# Patient Record
Sex: FEMALE | Race: WHITE | NOT HISPANIC OR LATINO | Employment: OTHER | URBAN - METROPOLITAN AREA
[De-identification: names, ages, dates, MRNs, and addresses within clinical notes are randomized per-mention and may not be internally consistent; named-entity substitution may affect disease eponyms.]

---

## 2017-01-03 ENCOUNTER — CLINICAL SUPPORT (OUTPATIENT)
Dept: DERMATOLOGY | Facility: CLINIC | Age: 82
End: 2017-01-03
Payer: MEDICARE

## 2017-01-03 ENCOUNTER — OFFICE VISIT (OUTPATIENT)
Dept: OBSTETRICS AND GYNECOLOGY | Facility: CLINIC | Age: 82
End: 2017-01-03
Payer: MEDICARE

## 2017-01-03 VITALS
DIASTOLIC BLOOD PRESSURE: 76 MMHG | BODY MASS INDEX: 25.39 KG/M2 | HEIGHT: 63 IN | SYSTOLIC BLOOD PRESSURE: 122 MMHG | WEIGHT: 143.31 LBS

## 2017-01-03 DIAGNOSIS — Z51.89 VISIT FOR WOUND CHECK: ICD-10-CM

## 2017-01-03 DIAGNOSIS — Z48.02 VISIT FOR SUTURE REMOVAL: Primary | ICD-10-CM

## 2017-01-03 PROCEDURE — 99213 OFFICE O/P EST LOW 20 MIN: CPT | Mod: PBBFAC,27 | Performed by: ADVANCED PRACTICE MIDWIFE

## 2017-01-03 PROCEDURE — 99212 OFFICE O/P EST SF 10 MIN: CPT | Mod: S$PBB,,, | Performed by: ADVANCED PRACTICE MIDWIFE

## 2017-01-03 PROCEDURE — 99999 PR PBB SHADOW E&M-EST. PATIENT-LVL III: CPT | Mod: PBBFAC,,, | Performed by: ADVANCED PRACTICE MIDWIFE

## 2017-01-03 PROCEDURE — 99999 PR PBB SHADOW E&M-EST. PATIENT-LVL III: CPT | Mod: PBBFAC,,,

## 2017-01-03 NOTE — PROGRESS NOTES
Suture Removal note:  CC: 83 y.o. female patient is here for suture removal.         HPI: Patient is s/p excision of SCC from the left medial shoulder on 12/16/2016.  Patient reports no problems.    WOUND PE:  Sutures intact.  Wound healing well.  Good approximation of skin edges.  No signs or symptoms of infection.    IMPRESSION:  SCC - margins clear.    PLAN:  Sutures removed today.  Continue wound care.    RTC: In 6 months.

## 2017-01-04 ENCOUNTER — OFFICE VISIT (OUTPATIENT)
Dept: FAMILY MEDICINE | Facility: CLINIC | Age: 82
End: 2017-01-04
Payer: MEDICARE

## 2017-01-04 VITALS
WEIGHT: 149.94 LBS | HEIGHT: 63 IN | HEART RATE: 64 BPM | SYSTOLIC BLOOD PRESSURE: 146 MMHG | TEMPERATURE: 98 F | DIASTOLIC BLOOD PRESSURE: 70 MMHG | BODY MASS INDEX: 26.57 KG/M2

## 2017-01-04 DIAGNOSIS — I10 HYPERTENSION, ESSENTIAL: ICD-10-CM

## 2017-01-04 DIAGNOSIS — R20.9 DISTURBANCE OF SKIN SENSATION: ICD-10-CM

## 2017-01-04 DIAGNOSIS — M79.2 NEUROPATHIC PAIN, LEG, LEFT: Primary | ICD-10-CM

## 2017-01-04 DIAGNOSIS — M79.7 FIBROMYALGIA: ICD-10-CM

## 2017-01-04 PROCEDURE — 99999 PR PBB SHADOW E&M-EST. PATIENT-LVL III: CPT | Mod: PBBFAC,,, | Performed by: INTERNAL MEDICINE

## 2017-01-04 PROCEDURE — 99214 OFFICE O/P EST MOD 30 MIN: CPT | Mod: S$PBB,,, | Performed by: INTERNAL MEDICINE

## 2017-01-04 PROCEDURE — 99213 OFFICE O/P EST LOW 20 MIN: CPT | Mod: PBBFAC,PO | Performed by: INTERNAL MEDICINE

## 2017-01-04 NOTE — PROGRESS NOTES
HISTORY OF PRESENT ILLNESS:    Gerda Lai is a 83 y.o. female  No LMP recorded. Patient is postmenopausal. presents today for follow up evaluation of labial abscess    Past Medical History   Diagnosis Date    Arthritis     Basal cell carcinoma 2016     right post auricular neck     Breast cancer     Cataract     Fibromyalgia     Hyperlipidemia     Hypertension     Personal history of colonic polyps     SCC (squamous cell carcinoma)      R chest    SCC (squamous cell carcinoma) 2016     left medial shoulder    Squamous cell carcinoma      right forearm    Thyroid disease     Vaginitis        Past Surgical History   Procedure Laterality Date    Thyriodectomy       partial    Total hip arthroplasty       right    Mastectomy       partial right    Tonsillectomy      Adenoidectomy      Cataract extraction w/  intraocular lens implant Bilateral     Yag  Left        MEDICATIONS AND ALLERGIES:      Current Outpatient Prescriptions:     acetaminophen (TYLENOL) 500 MG tablet, Take 500 mg by mouth every 6 (six) hours as needed for Pain. , Disp: , Rfl:     aliskiren (TEKTURNA) 300 MG Tab, Take 1 tablet (300 mg total) by mouth once daily., Disp: 30 tablet, Rfl: 11    ascorbic acid (VITAMIN C) 100 MG tablet, Take 100 mg by mouth once daily., Disp: , Rfl:     B INFANTIS/B ANI/B JOSE/B BIFID (PROBIOTIC 4X ORAL), Take 1 tablet by mouth daily, Disp: , Rfl:     chlorthalidone (HYGROTEN) 25 MG Tab, Take 25 mg by mouth once daily., Disp: , Rfl: 11    coenzyme Q10 (CO Q-10) 100 mg capsule, Take 100 mg by mouth once daily., Disp: , Rfl:     fluticasone (FLONASE) 50 mcg/actuation nasal spray, USE 1 SPRAY IN EACH NOSTRIL ONCE DAILY (Patient taking differently: as needed. ), Disp: 16 g, Rfl: 3    glucosamine-chondroitin 500-400 mg tablet, Take 1 tablet by mouth 2 (two) times daily. , Disp: , Rfl:     labetalol (NORMODYNE) 100 MG tablet, Take 1 tablet (100 mg total) by mouth every 12 (twelve)  hours. Take one in am and two in the pm, Disp: 60 tablet, Rfl: 11    lorazepam (ATIVAN) 0.5 MG tablet, Take one half tablet twice daily, Disp: 60 tablet, Rfl: 3    multivitamin capsule, Take 1 capsule by mouth once daily., Disp: , Rfl:     omeprazole (PRILOSEC OTC) 20 MG tablet, Take 20 mg by mouth once daily.  , Disp: , Rfl:     pravastatin (PRAVACHOL) 40 MG tablet, Take 1 tablet (40 mg total) by mouth once daily., Disp: 90 tablet, Rfl: 3    UNABLE TO FIND, once daily. OTC EYE DROP; Systane, Disp: , Rfl:     Review of patient's allergies indicates:   Allergen Reactions    Voltaren [diclofenac sodium] Other (See Comments)     Drops blood pressure    Clarithromycin Other (See Comments)     Weak, extreme fatigue. dizziness    Losartan Rash    Flexeril [cyclobenzaprine] Other (See Comments)     Dizziness    Iodine and iodide containing products     Lisinopril Other (See Comments)     Cough and sensation of throat swelling/?angioedema    Norvasc [amlodipine]      Not working      Tramadol      Dizzy and weak    Metoprolol Swelling     Tightness in throat    Sulfa (sulfonamide antibiotics) Rash    Verapamil (bulk) Palpitations       ROS:  GENERAL: No fever or chills.  BREASTS: No pain. No lumps. No discharge.  ABDOMEN: No pain. No nausea. No vomiting. No diarrhea. No constipation.  REPRODUCTIVE: No abnormal bleeding.   VULVA: No complaints  VAGINA: .No complaints  URINARY: No incontinence. No nocturia. No frequency. No dysuria.    PE:  APPEARANCE: Well nourished, well developed, in no acute distress.  AFFECT: WNL, alert and oriented x 3. Interactive during exam  PELVIC: Normal external female genitalia without lesions. Abscess well healed, intact, no s/s infection. Normal hair distribution. Adequate perineal body, normal urethral meatus.        DIAGNOSIS: Normal exam        LABS AND TESTS ORDERED:None    MEDICATIONS PRESCRIBED:None        FOLLOW-UP - PRN

## 2017-01-04 NOTE — MR AVS SNAPSHOT
[facility]  [address] [facility], [address]  [facility] [place] [address]  Phone: [phone]  Fax: [phone]                  Gerda Lai   2017 3:00 PM   Office Visit    Description:  Female : 1933   Provider:  Nancy Story MD   Department:  [facility]           Reason for Visit     Numbness           Diagnoses this Visit        Comments    Neuropathic pain, leg, left    -  Primary     Hypertension, essential         Disturbance of skin sensation         Fibromyalgia                To Do List           Future Appointments        Provider Department Dept Phone    1/10/2017 10:00 AM Cesar Burroughs MD [facility] - Rheumatology [phone]      Goals (5 Years of Data)     None      [facility]     [facility] On Call Nurse Care Line -  Assistance  Registered nurses in the [facility] provide clinical advisement, health education, appointment booking, and other advisory services.  Call for this free service at [phone].             Medications           Message regarding Medications     Verify the changes and/or additions to your medication regime listed below are the same as discussed with your clinician today.  If any of these changes or additions are incorrect, please notify your healthcare provider.             Verify that the below list of medications is an accurate representation of the medications you are currently taking.  If none reported, the list may be blank. If incorrect, please contact your healthcare provider. Carry this list with you in case of emergency.           Current Medications     acetaminophen (TYLENOL) 500 MG tablet Take 500 mg by mouth every 6 (six) hours as needed for Pain.     aliskiren (TEKTURNA) 300 MG Tab Take 1 tablet (300 mg total) by mouth once daily.    ascorbic acid (VITAMIN C) 100 MG tablet Take 100 mg by mouth once daily.    B INFANTIS/B ANI/B JOSE/B BIFID (PROBIOTIC 4X ORAL) Take 1 tablet by mouth  "daily    chlorthalidone (HYGROTEN) 25 MG Tab Take 25 mg by mouth once daily.    coenzyme Q10 (CO Q-10) 100 mg capsule Take 100 mg by mouth once daily.    fluticasone (FLONASE) 50 mcg/actuation nasal spray USE 1 SPRAY IN EACH NOSTRIL ONCE DAILY    glucosamine-chondroitin 500-400 mg tablet Take 1 tablet by mouth 2 (two) times daily.     labetalol (NORMODYNE) 100 MG tablet Take 1 tablet (100 mg total) by mouth every 12 (twelve) hours. Take one in am and two in the pm    lorazepam (ATIVAN) 0.5 MG tablet Take one half tablet twice daily    multivitamin capsule Take 1 capsule by mouth once daily.    omeprazole (PRILOSEC OTC) 20 MG tablet Take 20 mg by mouth once daily.      pravastatin (PRAVACHOL) 40 MG tablet Take 1 tablet (40 mg total) by mouth once daily.    UNABLE TO FIND once daily. OTC EYE DROP; Systane           Clinical Reference Information           Vital Signs - Last Recorded  Most recent update: 1/4/2017  3:14 PM by Keren Cleaning LPN    BP Pulse Temp Ht Wt BMI    (!) 146/70 (BP Location: Left arm, Patient Position: Sitting, BP Method: Manual) 64 97.5 °F (36.4 °C) 5' 3" (1.6 m) 68 kg (149 lb 14.6 oz) 26.56 kg/m2      Blood Pressure          Most Recent Value    BP  (!)  146/70      Allergies as of 1/4/2017     Voltaren [Diclofenac Sodium]    Clarithromycin    Losartan    Flexeril [Cyclobenzaprine]    Iodine And Iodide Containing Products    Lisinopril    Norvasc [Amlodipine]    Tramadol    Metoprolol    Sulfa (Sulfonamide Antibiotics)    Verapamil (Bulk)      Immunizations Administered on Date of Encounter - 1/4/2017     None      Orders Placed During Today's Visit     Future Labs/Procedures Expected by Expires    CBC auto differential  1/4/2017 3/5/2018    Comprehensive metabolic panel  1/4/2017 3/5/2018    Magnesium  1/4/2017 3/5/2018    PHOSPHORUS  1/4/2017 3/5/2018    TSH  1/4/2017 3/5/2018    Vitamin B12  1/4/2017 3/5/2018    Vitamin D  1/4/2017 3/5/2018      "

## 2017-01-10 ENCOUNTER — OFFICE VISIT (OUTPATIENT)
Dept: FAMILY MEDICINE | Facility: CLINIC | Age: 82
End: 2017-01-10
Payer: MEDICARE

## 2017-01-10 VITALS
WEIGHT: 146.63 LBS | TEMPERATURE: 98 F | HEART RATE: 61 BPM | BODY MASS INDEX: 25.03 KG/M2 | HEIGHT: 64 IN | DIASTOLIC BLOOD PRESSURE: 49 MMHG | SYSTOLIC BLOOD PRESSURE: 111 MMHG

## 2017-01-10 DIAGNOSIS — J06.9 VIRAL URI: Primary | ICD-10-CM

## 2017-01-10 PROCEDURE — 99999 PR PBB SHADOW E&M-EST. PATIENT-LVL III: CPT | Mod: PBBFAC,,, | Performed by: FAMILY MEDICINE

## 2017-01-10 PROCEDURE — 99213 OFFICE O/P EST LOW 20 MIN: CPT | Mod: S$PBB,,, | Performed by: FAMILY MEDICINE

## 2017-01-10 PROCEDURE — 99213 OFFICE O/P EST LOW 20 MIN: CPT | Mod: PBBFAC,PO | Performed by: FAMILY MEDICINE

## 2017-01-10 NOTE — MR AVS SNAPSHOT
[facility]  [address] [facility], [address]  [place] [address]  Phone: [phone]  Fax: [phone]                  Gerda Lai   1/10/2017 3:20 PM   Office Visit    Description:  Female : 1933   Provider:  Demario Sellers MD   Department:  [facility]           Reason for Visit     Sore Throat     Nasal Congestion     Fatigue                To Do List           Goals (5 Years of Data)     None      [facility]     [facility] On Call Nurse Care Line -  Assistance  Registered nurses in the [facility] On Call Center provide clinical advisement, health education, appointment booking, and other advisory services.  Call for this free service at [phone].             Medications           Message regarding Medications     Verify the changes and/or additions to your medication regime listed below are the same as discussed with your clinician today.  If any of these changes or additions are incorrect, please notify your healthcare provider.             Verify that the below list of medications is an accurate representation of the medications you are currently taking.  If none reported, the list may be blank. If incorrect, please contact your healthcare provider. Carry this list with you in case of emergency.           Current Medications     acetaminophen (TYLENOL) 500 MG tablet Take 500 mg by mouth every 6 (six) hours as needed for Pain.     aliskiren (TEKTURNA) 300 MG Tab Take 1 tablet (300 mg total) by mouth once daily.    ascorbic acid (VITAMIN C) 100 MG tablet Take 100 mg by mouth once daily.    B INFANTIS/B ANI/B JOSE/B BIFID (PROBIOTIC 4X ORAL) Take 1 tablet by mouth daily    chlorthalidone (HYGROTEN) 25 MG Tab Take 25 mg by mouth once daily.    coenzyme Q10 (CO Q-10) 100 mg capsule Take 100 mg by mouth once daily.    fluticasone (FLONASE) 50 mcg/actuation nasal spray USE 1 SPRAY IN EACH NOSTRIL ONCE DAILY    glucosamine-chondroitin 500-400 mg tablet  "Take 1 tablet by mouth 2 (two) times daily.     labetalol (NORMODYNE) 100 MG tablet Take 1 tablet (100 mg total) by mouth every 12 (twelve) hours. Take one in am and two in the pm    lorazepam (ATIVAN) 0.5 MG tablet Take one half tablet twice daily    multivitamin capsule Take 1 capsule by mouth once daily.    omeprazole (PRILOSEC OTC) 20 MG tablet Take 20 mg by mouth once daily.      pravastatin (PRAVACHOL) 40 MG tablet Take 1 tablet (40 mg total) by mouth once daily.    UNABLE TO FIND once daily. OTC EYE DROP; Systane           Clinical Reference Information           Vital Signs - Last Recorded  Most recent update: 1/10/2017  3:21 PM by Alta Alvarado MA    BP Pulse Temp Ht Wt BMI    (!) 111/49 (BP Location: Left arm, Patient Position: Sitting, BP Method: Automatic) 61 98.1 °F (36.7 °C) (Oral) 5' 4" (1.626 m) 66.5 kg (146 lb 9.7 oz) 25.16 kg/m2      Blood Pressure          Most Recent Value    BP  (!)  111/49      Allergies as of 1/10/2017     Voltaren [Diclofenac Sodium]    Clarithromycin    Losartan    Flexeril [Cyclobenzaprine]    Iodine And Iodide Containing Products    Lisinopril    Norvasc [Amlodipine]    Tramadol    Metoprolol    Sulfa (Sulfonamide Antibiotics)    Verapamil (Bulk)      Immunizations Administered on Date of Encounter - 1/10/2017     None      "

## 2017-01-10 NOTE — PROGRESS NOTES
Subjective:       Patient ID: Gerda Lai is a 83 y.o. female.    Chief Complaint: Sore Throat; Nasal Congestion; and Fatigue    HPI Comments: Disclaimer: This note has been generated using voice-recognition software. There may be typographical errors that have been missed during proof-reading    84 yo presents with 2-3 day history of cough, rhinorrhea, nasal congestion and sore throat.  Over past day has had several episodes of epistaxis from right nasal area.  Overall, symptoms have improved over past day    Sore Throat    Associated symptoms include congestion. Pertinent negatives include no shortness of breath.   Fatigue   Associated symptoms include congestion, fatigue and a sore throat. Pertinent negatives include no chest pain, chills or fever.     Review of Systems   Constitutional: Positive for fatigue. Negative for chills and fever.   HENT: Positive for congestion, nosebleeds, rhinorrhea and sore throat. Negative for sinus pressure.    Respiratory: Negative for chest tightness, shortness of breath and wheezing.    Cardiovascular: Negative for chest pain.       Objective:      Physical Exam   Constitutional: She appears well-developed and well-nourished. No distress.   HENT:   Right Ear: Tympanic membrane and ear canal normal.   Left Ear: Tympanic membrane and ear canal normal.   Nose: Mucosal edema and rhinorrhea present. Right sinus exhibits no maxillary sinus tenderness and no frontal sinus tenderness. Left sinus exhibits no maxillary sinus tenderness and no frontal sinus tenderness.   Evidence of anterior epistaxis, right nasal area   Neck: Normal range of motion. No JVD present.   Pulmonary/Chest: Effort normal and breath sounds normal. She has no wheezes. She has no rales.   Lymphadenopathy:     She has no cervical adenopathy.       Assessment:       No diagnosis found.    Plan:       1.  Reassurance, continue OTC medications, Tylenol  2.  Instructed on treatment of epistaxis

## 2017-01-11 ENCOUNTER — OFFICE VISIT (OUTPATIENT)
Dept: RHEUMATOLOGY | Facility: CLINIC | Age: 82
End: 2017-01-11
Payer: MEDICARE

## 2017-01-11 VITALS
DIASTOLIC BLOOD PRESSURE: 69 MMHG | HEART RATE: 63 BPM | BODY MASS INDEX: 24.92 KG/M2 | WEIGHT: 146 LBS | HEIGHT: 64 IN | SYSTOLIC BLOOD PRESSURE: 122 MMHG

## 2017-01-11 DIAGNOSIS — M70.62 TROCHANTERIC BURSITIS, LEFT HIP: ICD-10-CM

## 2017-01-11 DIAGNOSIS — M70.62 GREATER TROCHANTERIC BURSITIS OF LEFT HIP: ICD-10-CM

## 2017-01-11 DIAGNOSIS — M19.90 OSTEOARTHRITIS, UNSPECIFIED OSTEOARTHRITIS TYPE, UNSPECIFIED SITE: Primary | ICD-10-CM

## 2017-01-11 DIAGNOSIS — M19.042 PRIMARY OSTEOARTHRITIS OF BOTH HANDS: ICD-10-CM

## 2017-01-11 DIAGNOSIS — M19.041 PRIMARY OSTEOARTHRITIS OF BOTH HANDS: ICD-10-CM

## 2017-01-11 DIAGNOSIS — M54.17 LUMBOSACRAL RADICULOPATHY AT L5: ICD-10-CM

## 2017-01-11 DIAGNOSIS — M17.0 PRIMARY OSTEOARTHRITIS OF BOTH KNEES: ICD-10-CM

## 2017-01-11 DIAGNOSIS — R20.2 PARESTHESIA OF LEFT LEG: ICD-10-CM

## 2017-01-11 PROCEDURE — 99213 OFFICE O/P EST LOW 20 MIN: CPT | Mod: 25,S$PBB,, | Performed by: INTERNAL MEDICINE

## 2017-01-11 PROCEDURE — 20610 DRAIN/INJ JOINT/BURSA W/O US: CPT | Mod: PBBFAC | Performed by: INTERNAL MEDICINE

## 2017-01-11 PROCEDURE — 99213 OFFICE O/P EST LOW 20 MIN: CPT | Mod: PBBFAC | Performed by: INTERNAL MEDICINE

## 2017-01-11 PROCEDURE — 99999 PR PBB SHADOW E&M-EST. PATIENT-LVL III: CPT | Mod: PBBFAC,,, | Performed by: INTERNAL MEDICINE

## 2017-01-11 RX ORDER — TRIAMCINOLONE ACETONIDE 40 MG/ML
40 INJECTION, SUSPENSION INTRA-ARTICULAR; INTRAMUSCULAR
Status: DISCONTINUED | OUTPATIENT
Start: 2017-01-11 | End: 2017-01-11 | Stop reason: HOSPADM

## 2017-01-11 RX ORDER — GABAPENTIN 100 MG/1
100 CAPSULE ORAL 3 TIMES DAILY
Qty: 90 CAPSULE | Refills: 11 | Status: SHIPPED | OUTPATIENT
Start: 2017-01-11 | End: 2017-03-23

## 2017-01-11 RX ORDER — TRIAMCINOLONE ACETONIDE 40 MG/ML
40 INJECTION, SUSPENSION INTRA-ARTICULAR; INTRAMUSCULAR ONCE
Qty: 1 ML | Refills: 0 | Status: SHIPPED | OUTPATIENT
Start: 2017-01-11 | End: 2017-01-11

## 2017-01-11 RX ADMIN — TRIAMCINOLONE ACETONIDE 40 MG: 40 INJECTION, SUSPENSION INTRA-ARTICULAR; INTRAMUSCULAR at 11:01

## 2017-01-11 ASSESSMENT — ROUTINE ASSESSMENT OF PATIENT INDEX DATA (RAPID3)
FATIGUE SCORE: 7
PAIN SCORE: 0
PATIENT GLOBAL ASSESSMENT SCORE: 5
TOTAL RAPID3 SCORE: 2.33
MDHAQ FUNCTION SCORE: .6
PSYCHOLOGICAL DISTRESS SCORE: 0
AM STIFFNESS SCORE: 0, NO

## 2017-01-11 NOTE — PROGRESS NOTES
"Subjective:       Patient ID: Gerda Lai is a 83 y.o. female.    Chief Complaint: No chief complaint on file.    HPI     Ms. Gerda Lai is a 84 yo woman with PMHx of breast cancer, IBD, steoarthritis and fibromyalgia who presents to the clinic today for 6 month follow up visit. She reports tingling of her left lower extremity: tingling that goes from her left lower back down to the back and side of her left knee/calf. It is constant, is not associated with any pain or significant numbness, and nothing has helped her so far. She is not able to put an exact time frame as to when it started, but it is there constantly. She also has intermitted left upper extremity tingling. She was seen in the ER for this along with dizziness 3 weeks ago (on 12/14/16). Her dizziness was attributed to changes in her BP medication regimen and she was discharged after being instructed to follow up on out-pt basis.  She has had recent gastroenteritis 3-4 days ago, associated with diarrhea and two episodes of fecal incontinence.  However, she reports improved diarrhea and fecal incontinence now. She denies any falls, gait abnormalities, urinary incontinence, lower back pain or sudden onset of lower extremity weakness.     She last saw orthopedics for right knee pain on 12/1/16. She underwent Kenalog and lidocaine injection of her right knee during the visit. Imaging of bilateral knees was obtained (on 12/1/16 by orthopedics) which showed: "Moderate degenerative change seen in both knees but especially on the right with joint space narrowing and osteophyte forming medially and laterally.  This is seen on the left to a lesser extent."       Review of Systems   Constitutional: Negative for appetite change, fatigue and unexpected weight change.   Eyes: Negative for visual disturbance.   Respiratory: Negative for shortness of breath.    Cardiovascular: Negative for chest pain.   Gastrointestinal: Positive for diarrhea. Negative for " "abdominal pain, nausea and vomiting.        Two episodes of fecal incontinence with diarrhea, improved now   Genitourinary: Negative for dysuria and enuresis.   Musculoskeletal: Positive for arthralgias and joint swelling. Negative for back pain, gait problem, myalgias and neck pain.   Neurological: Negative for dizziness, syncope, weakness and light-headedness.         Objective:     Visit Vitals    /69    Pulse 63    Ht 5' 3.6" (1.615 m)    Wt 66.2 kg (146 lb)    BMI 25.38 kg/m2        Physical Exam   Vitals reviewed.  Constitutional: She is oriented to person, place, and time and well-developed, well-nourished, and in no distress. No distress.   Eyes: EOM are normal.   Neck: Normal range of motion.   Cardiovascular: Normal rate, regular rhythm, normal heart sounds and intact distal pulses.  Exam reveals no gallop and no friction rub.    No murmur heard.  Pulmonary/Chest: Effort normal and breath sounds normal. No respiratory distress. She has no wheezes. She has no rales. She exhibits no tenderness.       Right Side Rheumatological Exam     Examination finds the shoulder and wrist normal.    She has swelling of the knee    The patient has an enlarged 1st PIP, 1st MCP, 2nd PIP, 2nd MCP, 3rd PIP, 3rd MCP, 4th PIP, 4th MCP, 5th PIP and 5th MCP    Muscle Strength (0-5 scale):  Deltoid:  5  Triceps:  5  Iliopsoas: 5  Quadriceps:  5   Distal Lower Extremity: 5    Left Side Rheumatological Exam     Examination finds the shoulder and wrist normal.    She has swelling of the knee    The patient has an enlarged 1st PIP, 1st MCP, 2nd PIP, 2nd MCP, 3rd PIP, 3rd MCP, 4th PIP, 4th MCP, 5th PIP and 5th MCP.    Muscle Strength (0-5 scale):  Deltoid:  5  Triceps:  5  Iliopsoas: 5  Quadriceps:  5   Distal Lower Extremity: 5      Neurological: She is alert and oriented to person, place, and time. She displays normal reflexes. Coordination normal.   Reflex Scores:       Patellar reflexes are 2+ on the right side and 2+ on " the left side.       Achilles reflexes are 2+ on the right side and 2+ on the left side.  Skin: Skin is warm and dry. She is not diaphoretic.     Musculoskeletal: Normal range of motion. She exhibits tenderness ( left trochanter). She exhibits no edema or deformity.   Straight leg raise test negative           Assessment:       1. Osteoarthritis, unspecified osteoarthritis type, unspecified site    2. Paresthesia of left leg    3. Greater trochanteric bursitis of left hip            Plan:     RTC in 3 mo   Gabapentin 100 mg tid for tingling of LLE   X-ray lumbar spine, pelvis today   Steroid injection of left trochanter today       1. Osteoarthritis, unspecified osteoarthritis type, unspecified site  - X-Ray Pelvis Routine AP; Future    2. Paresthesia of left leg -- left lumbar radiculopathy in L5 distribution   - history of breast cancer; due to concern for possible metastatic disease explaining paresthesias, will obtain imaging of spine    - X-Ray Lumbar Spine AP And Lateral; Future   - pt instructed to start gabapentin 100 mg every night, and take it up to 3 times daily when she is not driving or operating machinery; she will f/u in 3 months    - gabapentin (NEURONTIN) 100 MG capsule; Take 1 capsule (100 mg total) by mouth 3 (three) times daily.  Dispense: 90 capsule; Refill: 11    3. Greater trochanteric bursitis of left hip  - given triamcinolone-lidocaine injection of left greater trochanter today for trochanteric bursitis (see procedure note)    - triamcinolone acetonide (KENALOG-40) 40 mg/mL injection; Inject 1 mL (40 mg total) into the muscle once.  Dispense: 1 mL; Refill: 0      Discussed with attending Dr. Burroughs. Staff recommendations to follow.     Ramirez Garcia MD   PGY1 Internal Medicine   [facility]   Pager [phone]

## 2017-01-11 NOTE — PROCEDURES
Large Joint Aspiration/Injection  Date/Time: 1/11/2017 11:01 AM  Performed by: ROMINA MITCHELL  Authorized by: ROMINA MITCHELL     Consent Done?:  Yes (Verbal)  Indications:  Pain  Procedure site marked: Yes    Timeout: Prior to procedure the correct patient, procedure, and site was verified      Location:  Hip  Site:  L greater trochanteric bursa  Prep: Patient was prepped and draped in usual sterile fashion    Ultrasonic Guidance for needle placement: No  Needle size:  25 G  Approach:  Lateral  Medications:  40 mg triamcinolone acetonide 40 mg/mL  Patient tolerance:  Patient tolerated the procedure well with no immediate complications

## 2017-01-11 NOTE — MR AVS SNAPSHOT
[facility]  [address]  [place] [address]  Phone: [phone]  Fax: [phone]                  Gerda Lai   2017 9:30 AM   Office Visit    Description:  Female : 1933   Provider:  Cesar Burroughs MD   Department:  [facility]           Diagnoses this Visit        Comments    Osteoarthritis, unspecified osteoarthritis type, unspecified site    -  Primary            To Do List           Future Appointments        Provider Department Dept Phone    2017 9:20 AM Demario Sellers MD [facility] [phone]      Goals (5 Years of Data)     None      Follow-Up and Disposition     Return in about 3 months (around 2017).       These Medications        Disp Refills Start End    gabapentin (NEURONTIN) 100 MG capsule 90 capsule 11 2017    Take 1 capsule (100 mg total) by mouth 3 (three) times daily. - Oral    Pharmacy: [facility]/pharmacy #[id number] - [place] - [address] Ph #: [phone]         [facility]     [facility] On Call Nurse Care Line -  Assistance  Registered nurses in the [facility] On Call Center provide clinical advisement, health education, appointment booking, and other advisory services.  Call for this free service at [phone].             Medications           Message regarding Medications     Verify the changes and/or additions to your medication regime listed below are the same as discussed with your clinician today.  If any of these changes or additions are incorrect, please notify your healthcare provider.        START taking these NEW medications        Refills    gabapentin (NEURONTIN) 100 MG capsule 11    Sig: Take 1 capsule (100 mg total) by mouth 3 (three) times daily.    Class: Normal    Route: Oral           Verify that the below list of medications is an accurate representation of the medications you are currently taking.  If none reported, the list may be blank. If  "incorrect, please contact your healthcare provider. Carry this list with you in case of emergency.           Current Medications     acetaminophen (TYLENOL) 500 MG tablet Take 500 mg by mouth every 6 (six) hours as needed for Pain.     aliskiren (TEKTURNA) 300 MG Tab Take 1 tablet (300 mg total) by mouth once daily.    ascorbic acid (VITAMIN C) 100 MG tablet Take 100 mg by mouth once daily.    B INFANTIS/B ANI/B JOSE/B BIFID (PROBIOTIC 4X ORAL) Take 1 tablet by mouth daily    chlorthalidone (HYGROTEN) 25 MG Tab Take 25 mg by mouth once daily.    coenzyme Q10 (CO Q-10) 100 mg capsule Take 100 mg by mouth once daily.    fluticasone (FLONASE) 50 mcg/actuation nasal spray USE 1 SPRAY IN EACH NOSTRIL ONCE DAILY    glucosamine-chondroitin 500-400 mg tablet Take 1 tablet by mouth 2 (two) times daily.     labetalol (NORMODYNE) 100 MG tablet Take 1 tablet (100 mg total) by mouth every 12 (twelve) hours. Take one in am and two in the pm    lorazepam (ATIVAN) 0.5 MG tablet Take one half tablet twice daily    multivitamin capsule Take 1 capsule by mouth once daily.    omeprazole (PRILOSEC OTC) 20 MG tablet Take 20 mg by mouth once daily.      pravastatin (PRAVACHOL) 40 MG tablet Take 1 tablet (40 mg total) by mouth once daily.    UNABLE TO FIND once daily. OTC EYE DROP; Systane    gabapentin (NEURONTIN) 100 MG capsule Take 1 capsule (100 mg total) by mouth 3 (three) times daily.           Clinical Reference Information           Vital Signs - Last Recorded  Most recent update: 1/11/2017  9:36 AM by Lu Trujillo MA    BP Pulse Ht Wt BMI    122/69 63 5' 3.6" (1.615 m) 66.2 kg (146 lb) 25.38 kg/m2      Blood Pressure          Most Recent Value    BP  122/69      Allergies as of 1/11/2017     Voltaren [Diclofenac Sodium]    Clarithromycin    Losartan    Flexeril [Cyclobenzaprine]    Iodine And Iodide Containing Products    Lisinopril    Norvasc [Amlodipine]    Tramadol    Metoprolol    Sulfa (Sulfonamide Antibiotics)    " Verapamil (Bulk)      Immunizations Administered on Date of Encounter - 1/11/2017     None      Orders Placed During Today's Visit     Future Labs/Procedures Expected by Expires    X-Ray Lumbar Spine AP And Lateral  1/11/2017 1/11/2018    X-Ray Pelvis Routine AP  1/11/2017 1/11/2018      Instructions    RTC in 3 months.   You got a steroid injection in your hip today.   You are starting a new medication: gabapentin 100 mg up to three times a day. You should not take gabapentin when you are driving in the daytime.   You are also getting x-rays of your spine and pelvis today.

## 2017-01-11 NOTE — PATIENT INSTRUCTIONS
RTC in 3 months.   You got a steroid injection in your hip today.   You are starting a new medication: gabapentin 100 mg up to three times a day. You should not take gabapentin when you are driving in the daytime.   You are also getting x-rays of your spine and pelvis today.

## 2017-01-11 NOTE — PROGRESS NOTES
I have personally taken the history and examined the patient and agree with the resident,s note as stated above. Pins and needles sensation left posterior hip, post thigh to lateral calf. No low back pain or weakness. No trauma. Saw Ortho 12/1/16 for pain in right knee, osteoarthritis, injected with steroid and improved.    Comprehensive Initial Assessment  Pain level: see MHAQ  Functional status: see MHAQ  Patient has:     Previous history malignancy: yes, breast    Weight loss: No   Previous longstanding steroid use or immunosuppression: NO    Recent significant infection: No   Fracture or suspected fracture: No   Bowel or bladder incontinence: bowel incontinence x 2 related to diarrhea, now resolving    Prior treatment and response: none  Employment status: none  Are radicular symptoms present?  Yes, left L5    Physical examination  Straight leg raise test b/l : negative  Ankle and knee reflexes: intact and normal  Quadriceps; ankle and great toe dorsiflexion and plantar flexion strength: Normal  Pulses in lower extremities: Normal  Sensory exam: NT    Treatment Plan  Advised against bed rest: Yes  Advised normal activity as tolerated: Yes      Exam: as above. Marked tenderness left>> right greater trochanter. Decreased internal rotation left hip(15 d)            Left lumbar radiculopathy, L5 predominantly  Left trochanteric bursitis, OA left hip    * After verbal consent and cleansing with Chloraprep the left gr. Trochanteric bursa injected with Kenalog 40mg with 1 ml 1 % lidocaine. Patient tolerated procedure well. Performed by Dr. Garcia and personally witnessed  X-ray lumbar spine and ap pelvis  Trial of gabapentin 100mg q pm, may increase up to tid as tolerated. No driving after dosing.  RTC 3 months. If not improved, refer to back and spine clinic, PT

## 2017-01-13 ENCOUNTER — TELEPHONE (OUTPATIENT)
Dept: RHEUMATOLOGY | Facility: CLINIC | Age: 82
End: 2017-01-13

## 2017-01-18 ENCOUNTER — TELEPHONE (OUTPATIENT)
Dept: RHEUMATOLOGY | Facility: CLINIC | Age: 82
End: 2017-01-18

## 2017-01-18 DIAGNOSIS — R25.2 CRAMP OF BOTH LOWER EXTREMITIES: Primary | ICD-10-CM

## 2017-01-18 DIAGNOSIS — N18.9 CHRONIC KIDNEY DISEASE, UNSPECIFIED STAGE: ICD-10-CM

## 2017-01-18 NOTE — TELEPHONE ENCOUNTER
Patient reports having severe leg cramps in both legs that started last night, and the patient also reported that she did a lot of walking yesterday. Pt. Reports that she started Gabapetin last week to help with leg pain she was already having and believes it made it worse, and would like to stop taking the medication. The pt. Last took a dose of the Gabapentin last night and is holding it today, to see what Dr. Burroughs recommends. I did advise the patient to stay well hydrated with all of the walking she is doing, because that can also cause muscle cramps, and patient verbalizes that she has been drinking lots of water. I verbalized to the pt. That I would consult with Dr. Burroughs and someone would call her back, she verbalized understanding.

## 2017-01-18 NOTE — TELEPHONE ENCOUNTER
Verbalized to patient that per Dr. Burroughs it is ok for her to stop her Gabapentin, pt. Verbalized understanding.

## 2017-01-23 ENCOUNTER — HOSPITAL ENCOUNTER (OUTPATIENT)
Dept: RADIOLOGY | Facility: HOSPITAL | Age: 82
Discharge: HOME | End: 2017-01-23
Attending: INTERNAL MEDICINE
Payer: MEDICARE

## 2017-01-23 ENCOUNTER — HOSPITAL ENCOUNTER (OUTPATIENT)
Dept: RADIOLOGY | Facility: HOSPITAL | Age: 82
Discharge: HOME | End: 2017-01-23
Attending: HOSPITALIST
Payer: MEDICARE

## 2017-01-23 DIAGNOSIS — M19.90 OSTEOARTHRITIS, UNSPECIFIED OSTEOARTHRITIS TYPE, UNSPECIFIED SITE: ICD-10-CM

## 2017-01-23 PROCEDURE — 72100 X-RAY EXAM L-S SPINE 2/3 VWS: CPT | Mod: TC,PO

## 2017-01-23 PROCEDURE — 72170 X-RAY EXAM OF PELVIS: CPT | Mod: TC,PO

## 2017-01-23 PROCEDURE — 72100 X-RAY EXAM L-S SPINE 2/3 VWS: CPT | Mod: 26,,, | Performed by: RADIOLOGY

## 2017-01-23 PROCEDURE — 72170 X-RAY EXAM OF PELVIS: CPT | Mod: 26,,, | Performed by: RADIOLOGY

## 2017-01-25 ENCOUNTER — TELEPHONE (OUTPATIENT)
Dept: RHEUMATOLOGY | Facility: CLINIC | Age: 82
End: 2017-01-25

## 2017-01-25 ENCOUNTER — OFFICE VISIT (OUTPATIENT)
Dept: FAMILY MEDICINE | Facility: CLINIC | Age: 82
End: 2017-01-25
Payer: MEDICARE

## 2017-01-25 VITALS
TEMPERATURE: 98 F | WEIGHT: 146.63 LBS | SYSTOLIC BLOOD PRESSURE: 130 MMHG | HEART RATE: 64 BPM | DIASTOLIC BLOOD PRESSURE: 60 MMHG | HEIGHT: 63 IN | BODY MASS INDEX: 25.98 KG/M2

## 2017-01-25 DIAGNOSIS — J32.9 SINUSITIS, UNSPECIFIED CHRONICITY, UNSPECIFIED LOCATION: Primary | ICD-10-CM

## 2017-01-25 DIAGNOSIS — R05.9 COUGH: ICD-10-CM

## 2017-01-25 PROCEDURE — 99214 OFFICE O/P EST MOD 30 MIN: CPT | Mod: PBBFAC,PO | Performed by: FAMILY MEDICINE

## 2017-01-25 PROCEDURE — 99213 OFFICE O/P EST LOW 20 MIN: CPT | Mod: S$PBB,,, | Performed by: FAMILY MEDICINE

## 2017-01-25 PROCEDURE — 99999 PR PBB SHADOW E&M-EST. PATIENT-LVL IV: CPT | Mod: PBBFAC,,, | Performed by: FAMILY MEDICINE

## 2017-01-25 RX ORDER — BENZONATATE 200 MG/1
200 CAPSULE ORAL 3 TIMES DAILY PRN
Qty: 30 CAPSULE | Refills: 0 | Status: SHIPPED | OUTPATIENT
Start: 2017-01-25 | End: 2017-02-04

## 2017-01-25 RX ORDER — DOXYCYCLINE 100 MG/1
100 CAPSULE ORAL EVERY 12 HOURS
Qty: 20 CAPSULE | Refills: 0 | Status: SHIPPED | OUTPATIENT
Start: 2017-01-25 | End: 2017-02-20 | Stop reason: ALTCHOICE

## 2017-01-25 NOTE — MR AVS SNAPSHOT
[facility]  [address] [facility], [address]  [facility] [address]  Phone: [phone]  Fax: [phone]                  Gerda Lai   2017 3:40 PM   Office Visit    Description:  Female : 1933   Provider:  Lucy Turner MD   Department:  [facility]           Reason for Visit     Sore Throat     Cough     Nasal Congestion           Diagnoses this Visit        Comments    Sinusitis, unspecified chronicity, unspecified location    -  Primary     Cough                To Do List           Future Appointments        Provider Department Dept Phone    2017 9:20 AM Demario Sellers MD [facility] [phone]    2017 9:00 AM Cesar Burroughs MD [facility] - Rheumatology [phone]      Goals (5 Years of Data)     None       These Medications        Disp Refills Start End    doxycycline (VIBRAMYCIN) 100 MG Cap 20 capsule 0 2017     Take 1 capsule (100 mg total) by mouth every 12 (twelve) hours. May replace with less expensive alternative - Oral    Pharmacy: [facility]/pharmacy #[id number] [address] Ph #: [phone]       benzonatate (TESSALON) 200 MG capsule 30 capsule 0 2017    Take 1 capsule (200 mg total) by mouth 3 (three) times daily as needed. - Oral    [id number] [address] Ph #: [phone]         [facility] On Call     [facility] On Call Nurse Care Line -  Assistance  Registered nurses in the [facility] On Call Center provide clinical advisement, health education, appointment booking, and other advisory services.  Call for this free service at [phone].             Medications           Message regarding Medications     Verify the changes and/or additions to your medication regime listed below are the same as discussed with your clinician today.  If any of these changes or additions are incorrect, please notify your healthcare  provider.        START taking these NEW medications        Refills    doxycycline (VIBRAMYCIN) 100 MG Cap 0    Sig: Take 1 capsule (100 mg total) by mouth every 12 (twelve) hours. May replace with less expensive alternative    Class: Normal    Route: Oral    benzonatate (TESSALON) 200 MG capsule 0    Sig: Take 1 capsule (200 mg total) by mouth 3 (three) times daily as needed.    Class: Normal    Route: Oral           Verify that the below list of medications is an accurate representation of the medications you are currently taking.  If none reported, the list may be blank. If incorrect, please contact your healthcare provider. Carry this list with you in case of emergency.           Current Medications     acetaminophen (TYLENOL) 500 MG tablet Take 500 mg by mouth every 6 (six) hours as needed for Pain.     aliskiren (TEKTURNA) 300 MG Tab Take 1 tablet (300 mg total) by mouth once daily.    ascorbic acid (VITAMIN C) 100 MG tablet Take 100 mg by mouth once daily.    B INFANTIS/B ANI/B JOSE/B BIFID (PROBIOTIC 4X ORAL) Take 1 tablet by mouth daily    chlorthalidone (HYGROTEN) 25 MG Tab Take 25 mg by mouth once daily.    coenzyme Q10 (CO Q-10) 100 mg capsule Take 100 mg by mouth once daily.    fluticasone (FLONASE) 50 mcg/actuation nasal spray USE 1 SPRAY IN EACH NOSTRIL ONCE DAILY    glucosamine-chondroitin 500-400 mg tablet Take 1 tablet by mouth 2 (two) times daily.     labetalol (NORMODYNE) 100 MG tablet Take 1 tablet (100 mg total) by mouth every 12 (twelve) hours. Take one in am and two in the pm    lorazepam (ATIVAN) 0.5 MG tablet Take one half tablet twice daily    multivitamin capsule Take 1 capsule by mouth once daily.    omeprazole (PRILOSEC OTC) 20 MG tablet Take 20 mg by mouth once daily.      pravastatin (PRAVACHOL) 40 MG tablet Take 1 tablet (40 mg total) by mouth once daily.    UNABLE TO FIND once daily. OTC EYE DROP; Systane    benzonatate (TESSALON) 200 MG capsule Take 1 capsule (200 mg total) by mouth  "3 (three) times daily as needed.    doxycycline (VIBRAMYCIN) 100 MG Cap Take 1 capsule (100 mg total) by mouth every 12 (twelve) hours. May replace with less expensive alternative    gabapentin (NEURONTIN) 100 MG capsule Take 1 capsule (100 mg total) by mouth 3 (three) times daily.           Clinical Reference Information           Vital Signs - Last Recorded  Most recent update: 1/25/2017  3:47 PM by Tatiana Beltran MA    BP Pulse Temp Ht Wt BMI    130/60 (BP Location: Left arm) 64 97.7 °F (36.5 °C) 5' 3" (1.6 m) 66.5 kg (146 lb 9.7 oz) 25.97 kg/m2      Blood Pressure          Most Recent Value    BP  130/60      Allergies as of 1/25/2017     Voltaren [Diclofenac Sodium]    Clarithromycin    Losartan    Flexeril [Cyclobenzaprine]    Iodine And Iodide Containing Products    Lisinopril    Norvasc [Amlodipine]    Tramadol    Metoprolol    Sulfa (Sulfonamide Antibiotics)    Verapamil (Bulk)      Immunizations Administered on Date of Encounter - 1/25/2017     None      "

## 2017-01-25 NOTE — TELEPHONE ENCOUNTER
----- Message from Cesar Burroughs MD sent at 1/24/2017 12:32 PM CST -----  Please tell pt the kidney function remains moderately reduced. Should see Dr. Sellers about this at it is slightly worse. Need strict control of blood pressure, < 130/80 to preserve kidney. The iron studies indicate iron deficiency and taking iron tablet may help with the cramps. Would also discuss with Dr. Sellers. The magnesium is fine. HUMAQ

## 2017-01-26 NOTE — PROGRESS NOTES
Pt complaining of productive cough for 2 weeks  Patient denies fever or chills, cough seems to be getting worse  Past Medical History   Diagnosis Date    Arthritis     Basal cell carcinoma 09/2016     right post auricular neck     Breast cancer     Cataract     Fibromyalgia     Hyperlipidemia     Hypertension     Personal history of colonic polyps     SCC (squamous cell carcinoma) 2015     R chest    SCC (squamous cell carcinoma) 2016     left medial shoulder    Squamous cell carcinoma 2015     right forearm    Thyroid disease     Vaginitis      Patient  reports that she has never smoked. She has never used smokeless tobacco. She reports that she drinks alcohol. She reports that she does not use illicit drugs.  Family History   Problem Relation Age of Onset    Breast cancer Mother     Stroke Paternal Grandfather     Heart disease Father     Cancer Paternal Aunt     Heart disease Paternal Aunt     Glaucoma Paternal Grandmother     Amblyopia Neg Hx     Blindness Neg Hx     Cataracts Neg Hx     Diabetes Neg Hx     Hypertension Neg Hx     Macular degeneration Neg Hx     Retinal detachment Neg Hx     Strabismus Neg Hx     Thyroid disease Neg Hx     Melanoma Neg Hx        PE:  General:congested  VS   Vitals:    01/25/17 1545   BP: 130/60   Pulse: 64   Temp: 97.7 °F (36.5 °C)     Heent: TMs dull bilaterally ear canals clear nasal mucosa injected maxillary and   frontal sinuses mildly tender percussion bilaterally.  Throat clear mild postnasal drip  Neck: Supple, nontender no thyromegaly no carotid bruits auscultated bilaterally  CV: rrr without murmur   Lungs: Coarse  bs bilaterally, fair respiratory excursion  ABD:normal bs, non-tender, no hepatosplenomegaly  Skin:good turgor no rashes or lesions  IMP: Bronchitis          Acute sinusitis          Productive cough   Plan: Refer to the med card dated 1/25/2017  Return if not better in a few days.

## 2017-02-01 ENCOUNTER — OFFICE VISIT (OUTPATIENT)
Dept: FAMILY MEDICINE | Facility: CLINIC | Age: 82
End: 2017-02-01
Payer: MEDICARE

## 2017-02-01 ENCOUNTER — LAB VISIT (OUTPATIENT)
Dept: LAB | Facility: HOSPITAL | Age: 82
End: 2017-02-01
Attending: FAMILY MEDICINE
Payer: MEDICARE

## 2017-02-01 VITALS
DIASTOLIC BLOOD PRESSURE: 70 MMHG | BODY MASS INDEX: 25.52 KG/M2 | HEIGHT: 64 IN | SYSTOLIC BLOOD PRESSURE: 134 MMHG | WEIGHT: 149.5 LBS | HEART RATE: 63 BPM | TEMPERATURE: 98 F

## 2017-02-01 DIAGNOSIS — N18.2 CKD (CHRONIC KIDNEY DISEASE), STAGE II: ICD-10-CM

## 2017-02-01 DIAGNOSIS — N18.2 CKD (CHRONIC KIDNEY DISEASE), STAGE II: Primary | ICD-10-CM

## 2017-02-01 LAB
ANION GAP SERPL CALC-SCNC: 10 MMOL/L
BUN SERPL-MCNC: 33 MG/DL
CALCIUM SERPL-MCNC: 9.5 MG/DL
CHLORIDE SERPL-SCNC: 106 MMOL/L
CO2 SERPL-SCNC: 23 MMOL/L
CREAT SERPL-MCNC: 1.1 MG/DL
EST. GFR  (AFRICAN AMERICAN): 53.7 ML/MIN/1.73 M^2
EST. GFR  (NON AFRICAN AMERICAN): 46.5 ML/MIN/1.73 M^2
GLUCOSE SERPL-MCNC: 68 MG/DL
POTASSIUM SERPL-SCNC: 4.1 MMOL/L
SODIUM SERPL-SCNC: 139 MMOL/L

## 2017-02-01 PROCEDURE — 99214 OFFICE O/P EST MOD 30 MIN: CPT | Mod: S$PBB,,, | Performed by: FAMILY MEDICINE

## 2017-02-01 PROCEDURE — 36415 COLL VENOUS BLD VENIPUNCTURE: CPT | Mod: PO

## 2017-02-01 PROCEDURE — 99999 PR PBB SHADOW E&M-EST. PATIENT-LVL III: CPT | Mod: PBBFAC,,, | Performed by: FAMILY MEDICINE

## 2017-02-01 PROCEDURE — 80048 BASIC METABOLIC PNL TOTAL CA: CPT

## 2017-02-01 NOTE — PROGRESS NOTES
Subjective:       Patient ID: Gerda Lai is a 83 y.o. female.    Chief Complaint: Results (Follow up Kidney functions)    HPI Comments: Disclaimer: This note has been generated using voice-recognition software. There may be typographical errors that have been missed during proof-reading    Patient is 83-year-old presents today for follow-up of abnormal labs.  During a visit to the rheumatology department, patient had labs drawn, showing an elevated creatinine of 1.1 and slightly elevated BUNs.  No other complaints.  She has continued to take her antihypertensive medications with no specific side effects.  Blood pressure monitoring has shown her to be normotensive.  She has also recently stopped Gabapentin.  Denies use of NSAIDs    Review of Systems   Constitutional: Negative for activity change, fatigue and unexpected weight change.   Respiratory: Negative for cough, chest tightness and shortness of breath.    Cardiovascular: Negative for chest pain, palpitations and leg swelling.   Neurological: Negative for dizziness, weakness, light-headedness and headaches.       Objective:      Physical Exam   Constitutional: She is oriented to person, place, and time. She appears well-developed and well-nourished. No distress.   Neck: Normal range of motion. No JVD present. No thyromegaly present.   Cardiovascular: Normal rate and regular rhythm.    No murmur heard.  Pulmonary/Chest: Effort normal and breath sounds normal. She has no wheezes. She has no rales.   Musculoskeletal: She exhibits no edema.   Lymphadenopathy:     She has no cervical adenopathy.   Neurological: She is alert and oriented to person, place, and time. She has normal reflexes.       Assessment:       1. CKD (chronic kidney disease), stage II        Plan:       1.  Continue present medications  2.  Repeat BMP, renal US  3.  F/u after test results

## 2017-02-01 NOTE — MR AVS SNAPSHOT
[facility]  [address] [facility], [address]  [facility] [address]  Phone: [phone]  Fax: [phone]                  Gerda Lai   2017 1:20 PM   Office Visit    Description:  Female : 1933   Provider:  Demario Sellers MD   Department:  [facility]           Reason for Visit     Results           Diagnoses this Visit        Comments    CKD (chronic kidney disease), stage II    -  Primary            To Do List           Future Appointments        Provider Department Dept Phone    2017 9:20 AM Demario Sellers MD [facility] [phone]    2017 9:00 AM Cesar Burroughs MD [facility] - Rheumatology [phone]      Goals (5 Years of Data)     None      [facility]     [facility] Nurse Care Line -  Assistance  Registered nurses in the [facility] On Call Center provide clinical advisement, health education, appointment booking, and other advisory services.  Call for this free service at [phone].             Medications           Message regarding Medications     Verify the changes and/or additions to your medication regime listed below are the same as discussed with your clinician today.  If any of these changes or additions are incorrect, please notify your healthcare provider.             Verify that the below list of medications is an accurate representation of the medications you are currently taking.  If none reported, the list may be blank. If incorrect, please contact your healthcare provider. Carry this list with you in case of emergency.           Current Medications     acetaminophen (TYLENOL) 500 MG tablet Take 500 mg by mouth every 6 (six) hours as needed for Pain.     aliskiren (TEKTURNA) 300 MG Tab Take 1 tablet (300 mg total) by mouth once daily.    ascorbic acid (VITAMIN C) 100 MG tablet Take 100 mg by mouth once daily.    B INFANTIS/B ANI/B JOSE/B BIFID (PROBIOTIC 4X ORAL) Take 1 tablet by mouth daily     "benzonatate (TESSALON) 200 MG capsule Take 1 capsule (200 mg total) by mouth 3 (three) times daily as needed.    chlorthalidone (HYGROTEN) 25 MG Tab Take 25 mg by mouth once daily.    coenzyme Q10 (CO Q-10) 100 mg capsule Take 100 mg by mouth once daily.    doxycycline (VIBRAMYCIN) 100 MG Cap Take 1 capsule (100 mg total) by mouth every 12 (twelve) hours. May replace with less expensive alternative    fluticasone (FLONASE) 50 mcg/actuation nasal spray USE 1 SPRAY IN EACH NOSTRIL ONCE DAILY    gabapentin (NEURONTIN) 100 MG capsule Take 1 capsule (100 mg total) by mouth 3 (three) times daily.    glucosamine-chondroitin 500-400 mg tablet Take 1 tablet by mouth 2 (two) times daily.     labetalol (NORMODYNE) 100 MG tablet Take 1 tablet (100 mg total) by mouth every 12 (twelve) hours. Take one in am and two in the pm    lorazepam (ATIVAN) 0.5 MG tablet Take one half tablet twice daily    multivitamin capsule Take 1 capsule by mouth once daily.    omeprazole (PRILOSEC OTC) 20 MG tablet Take 20 mg by mouth once daily.      pravastatin (PRAVACHOL) 40 MG tablet Take 1 tablet (40 mg total) by mouth once daily.    UNABLE TO FIND once daily. OTC EYE DROP; Systane           Clinical Reference Information           Vital Signs - Last Recorded  Most recent update: 2/1/2017  1:25 PM by Alta Alvarado MA    BP Pulse Temp Ht Wt BMI    (!) 143/59 (BP Location: Right arm, Patient Position: Sitting, BP Method: Automatic) 63 97.8 °F (36.6 °C) (Oral) 5' 4" (1.626 m) 67.8 kg (149 lb 7.6 oz) 25.66 kg/m2      Blood Pressure          Most Recent Value    BP  (!)  143/59      Allergies as of 2/1/2017     Voltaren [Diclofenac Sodium]    Clarithromycin    Losartan    Flexeril [Cyclobenzaprine]    Iodine And Iodide Containing Products    Lisinopril    Norvasc [Amlodipine]    Tramadol    Metoprolol    Sulfa (Sulfonamide Antibiotics)    Verapamil (Bulk)      Immunizations Administered on Date of Encounter - 2/1/2017     None      Orders Placed " During Today's Visit     Future Labs/Procedures Expected by Expires    Basic metabolic panel  2/1/2017 2/1/2018    US Kidney Only  2/1/2017 5/2/2017

## 2017-02-03 ENCOUNTER — HOSPITAL ENCOUNTER (OUTPATIENT)
Dept: RADIOLOGY | Facility: HOSPITAL | Age: 82
Discharge: HOME | End: 2017-02-03
Attending: FAMILY MEDICINE
Payer: MEDICARE

## 2017-02-03 DIAGNOSIS — N18.2 CKD (CHRONIC KIDNEY DISEASE), STAGE II: ICD-10-CM

## 2017-02-03 PROCEDURE — 76770 US EXAM ABDO BACK WALL COMP: CPT | Mod: 26,GC,, | Performed by: RADIOLOGY

## 2017-02-03 PROCEDURE — 76770 US EXAM ABDO BACK WALL COMP: CPT | Mod: TC

## 2017-02-13 ENCOUNTER — TELEPHONE (OUTPATIENT)
Dept: FAMILY MEDICINE | Facility: CLINIC | Age: 82
End: 2017-02-13

## 2017-02-13 NOTE — TELEPHONE ENCOUNTER
Pharmacy would like you to clarify below RX, medication was sent with two directions.  Please send corrected prescription.      labetalol (NORMODYNE) 100 MG tablet 60 tablet 11 12/15/2016  No   Sig - Route: Take 1 tablet (100 mg total) by mouth every 12 (twelve) hours. Take one in am and two in the pm - Oral

## 2017-02-20 ENCOUNTER — LAB VISIT (OUTPATIENT)
Dept: LAB | Facility: HOSPITAL | Age: 82
End: 2017-02-20
Attending: FAMILY MEDICINE
Payer: MEDICARE

## 2017-02-20 ENCOUNTER — OFFICE VISIT (OUTPATIENT)
Dept: FAMILY MEDICINE | Facility: CLINIC | Age: 82
End: 2017-02-20
Payer: MEDICARE

## 2017-02-20 VITALS
HEIGHT: 63 IN | DIASTOLIC BLOOD PRESSURE: 58 MMHG | SYSTOLIC BLOOD PRESSURE: 100 MMHG | BODY MASS INDEX: 25.78 KG/M2 | TEMPERATURE: 98 F | WEIGHT: 145.5 LBS

## 2017-02-20 DIAGNOSIS — M70.62 TROCHANTERIC BURSITIS, LEFT HIP: Primary | ICD-10-CM

## 2017-02-20 DIAGNOSIS — I10 ESSENTIAL HYPERTENSION: ICD-10-CM

## 2017-02-20 LAB
ANION GAP SERPL CALC-SCNC: 7 MMOL/L
BUN SERPL-MCNC: 24 MG/DL
CALCIUM SERPL-MCNC: 9.6 MG/DL
CHLORIDE SERPL-SCNC: 105 MMOL/L
CO2 SERPL-SCNC: 26 MMOL/L
CREAT SERPL-MCNC: 1.1 MG/DL
EST. GFR  (AFRICAN AMERICAN): 53.3 ML/MIN/1.73 M^2
EST. GFR  (NON AFRICAN AMERICAN): 46.2 ML/MIN/1.73 M^2
GLUCOSE SERPL-MCNC: 94 MG/DL
POTASSIUM SERPL-SCNC: 4.4 MMOL/L
SODIUM SERPL-SCNC: 138 MMOL/L

## 2017-02-20 PROCEDURE — 99999 PR PBB SHADOW E&M-EST. PATIENT-LVL III: CPT | Mod: PBBFAC,,, | Performed by: FAMILY MEDICINE

## 2017-02-20 PROCEDURE — 99214 OFFICE O/P EST MOD 30 MIN: CPT | Mod: S$PBB,,, | Performed by: FAMILY MEDICINE

## 2017-02-20 PROCEDURE — 80048 BASIC METABOLIC PNL TOTAL CA: CPT

## 2017-02-20 PROCEDURE — 36415 COLL VENOUS BLD VENIPUNCTURE: CPT | Mod: PO

## 2017-02-20 NOTE — PROGRESS NOTES
Subjective:       Patient ID: Gerda Lai is a 84 y.o. female.    Chief Complaint: Follow-up (CKD follwo up)    HPI Comments: Disclaimer: This note has been generated using voice-recognition software. There may be typographical errors that have been missed during proof-reading    Patient is 84-year-old presents today for follow-up of hypertension and abnormal labs.  Since last seen, she has been doing well on her medications, and denies any side effects.  On note, she was seen by the rheumatology department for recurrent pain involving the left hip, status post steroid injection for trochanteric bursitis, which the patient states did not help.  She has continued to have pain involving the lateral aspect of the left thigh, approximately a 67/10, worse with ambulation.  Symptoms have not improved by taking Tylenol.    Review of Systems   Constitutional: Negative for activity change, fatigue and unexpected weight change.   Genitourinary: Negative for difficulty urinating, dysuria, frequency and hematuria.   Musculoskeletal: Positive for arthralgias. Negative for gait problem and joint swelling.   Neurological: Negative for weakness.       Objective:      Physical Exam   Constitutional: She appears well-developed and well-nourished. No distress.   Neck: Normal range of motion. No JVD present. No thyromegaly present.   Cardiovascular: Normal rate and regular rhythm.    No murmur heard.  Pulmonary/Chest: Effort normal and breath sounds normal. She has no rales.   Abdominal: Soft. Bowel sounds are normal. There is no tenderness.   Musculoskeletal: She exhibits tenderness. She exhibits no edema.   Tender to palpation over left greater trochanter region  Left hip exam shows full flexion, int/ext rotation   Lymphadenopathy:     She has no cervical adenopathy.       Assessment:       1. Trochanteric bursitis, left hip    2. Essential hypertension        Plan:       1.  Recent labs, US reviewed with pt today  2.  Continue  present medications, f/u 4 months  3.  Consult Rheumatology

## 2017-02-20 NOTE — MR AVS SNAPSHOT
[facility]  [address] [facility], [address]  [facility] [address]  Phone: [phone]  Fax: [phone]                  Gerda Lai   2017 9:20 AM   Office Visit    Description:  Female : 1933   Provider:  Demario Sellers MD   Department:  [facility]           Reason for Visit     Follow-up           Diagnoses this Visit        Comments    Trochanteric bursitis, left hip    -  Primary     Essential hypertension                To Do List           Future Appointments        Provider Department Dept Phone    2017 9:00 AM Cesar Burroughs MD [facility] [phone]      Goals (5 Years of Data)     None      Follow-Up and Disposition     Return in about 4 months (around 2017).      [facility]     [facility] Line - 24/7 Assistance  Registered nurses in the [facility] provide clinical advisement, health education, appointment booking, and other advisory services.  Call for this free service at [phone].             Medications           Message regarding Medications     Verify the changes and/or additions to your medication regime listed below are the same as discussed with your clinician today.  If any of these changes or additions are incorrect, please notify your healthcare provider.        STOP taking these medications     doxycycline (VIBRAMYCIN) 100 MG Cap Take 1 capsule (100 mg total) by mouth every 12 (twelve) hours. May replace with less expensive alternative           Verify that the below list of medications is an accurate representation of the medications you are currently taking.  If none reported, the list may be blank. If incorrect, please contact your healthcare provider. Carry this list with you in case of emergency.           Current Medications     acetaminophen (TYLENOL) 500 MG tablet Take 500 mg by mouth every 6 (six) hours as needed for Pain.     aliskiren (TEKTURNA) 300 MG Tab  "Take 1 tablet (300 mg total) by mouth once daily.    ascorbic acid (VITAMIN C) 100 MG tablet Take 100 mg by mouth once daily.    B INFANTIS/B ANI/B JOSE/B BIFID (PROBIOTIC 4X ORAL) Take 1 tablet by mouth daily    chlorthalidone (HYGROTEN) 25 MG Tab Take 25 mg by mouth once daily.    coenzyme Q10 (CO Q-10) 100 mg capsule Take 100 mg by mouth once daily.    fluticasone (FLONASE) 50 mcg/actuation nasal spray USE 1 SPRAY IN EACH NOSTRIL ONCE DAILY    gabapentin (NEURONTIN) 100 MG capsule Take 1 capsule (100 mg total) by mouth 3 (three) times daily.    glucosamine-chondroitin 500-400 mg tablet Take 1 tablet by mouth 2 (two) times daily.     labetalol (NORMODYNE) 100 MG tablet Take 1 tablet (100 mg total) by mouth every 12 (twelve) hours. Take one in am and two in the pm    lorazepam (ATIVAN) 0.5 MG tablet Take one half tablet twice daily    multivitamin capsule Take 1 capsule by mouth once daily.    omeprazole (PRILOSEC OTC) 20 MG tablet Take 20 mg by mouth once daily.      pravastatin (PRAVACHOL) 40 MG tablet Take 1 tablet (40 mg total) by mouth once daily.    UNABLE TO FIND once daily. OTC EYE DROP; Systane           Clinical Reference Information           Your Vitals Were     BP Temp Height Weight BMI    100/58 97.6 °F (36.4 °C) 5' 3" (1.6 m) 66 kg (145 lb 8.1 oz) 25.77 kg/m2      Blood Pressure          Most Recent Value    BP  (!)  100/58      Allergies as of 2/20/2017     Voltaren [Diclofenac Sodium]    Clarithromycin    Losartan    Flexeril [Cyclobenzaprine]    Iodine And Iodide Containing Products    Lisinopril    Norvasc [Amlodipine]    Tramadol    Metoprolol    Sulfa (Sulfonamide Antibiotics)    Verapamil (Bulk)      Immunizations Administered on Date of Encounter - 2/20/2017     None      Orders Placed During Today's Visit      Normal Orders This Visit    Ambulatory consult to Rheumatology     Future Labs/Procedures Expected by Expires    Basic metabolic panel  2/20/2017 2/20/2018      Language Assistance " Services     ATTENTION: Language assistance services are available, free of charge. Please call [phone].      ATENCIÓN: Si habla naseem, tiene a perez disposición servicios gratuitos de asistencia lingüística. Llame al [phone].     CHÚ Ý: N?u b?n nói Ti?ng Vi?t, có các d?ch v? h? tr? ngôn ng? mi?n phí dành cho b?n. G?i s? [phone].         [facility] complies with applicable Federal civil rights laws and does not discriminate on the basis of race, color, national origin, age, disability, or sex.

## 2017-02-23 ENCOUNTER — OFFICE VISIT (OUTPATIENT)
Dept: ORTHOPEDICS | Facility: CLINIC | Age: 82
End: 2017-02-23
Payer: MEDICARE

## 2017-02-23 VITALS
SYSTOLIC BLOOD PRESSURE: 127 MMHG | HEIGHT: 63 IN | DIASTOLIC BLOOD PRESSURE: 64 MMHG | WEIGHT: 147.19 LBS | HEART RATE: 67 BPM | BODY MASS INDEX: 26.08 KG/M2

## 2017-02-23 DIAGNOSIS — M70.62 TROCHANTERIC BURSITIS OF LEFT HIP: Primary | ICD-10-CM

## 2017-02-23 DIAGNOSIS — M54.32 SCIATICA OF LEFT SIDE: ICD-10-CM

## 2017-02-23 PROCEDURE — 99214 OFFICE O/P EST MOD 30 MIN: CPT | Mod: PBBFAC | Performed by: PHYSICIAN ASSISTANT

## 2017-02-23 PROCEDURE — 99213 OFFICE O/P EST LOW 20 MIN: CPT | Mod: S$PBB,,, | Performed by: PHYSICIAN ASSISTANT

## 2017-02-23 PROCEDURE — 99999 PR PBB SHADOW E&M-EST. PATIENT-LVL IV: CPT | Mod: PBBFAC,,, | Performed by: PHYSICIAN ASSISTANT

## 2017-02-23 RX ORDER — METHYLPREDNISOLONE 4 MG/1
TABLET ORAL
Qty: 1 TABLET | Refills: 0 | Status: SHIPPED | OUTPATIENT
Start: 2017-02-23 | End: 2017-03-07 | Stop reason: ALTCHOICE

## 2017-02-23 NOTE — MR AVS SNAPSHOT
[facility]  [address]  [facility] [address]  Phone: [phone]                  Gerda Lai   2017 9:30 AM   Appointment    Description:  Female : 1933   Provider:  Bianca Hu PA-C   Department:  [facility]                To Do List           Future Appointments        Provider Department Dept Phone    2017 9:30 AM Bianca Hu PA-C [facility] [phone]    2017 9:00 AM Cesar Burroughs MD [facility] [phone]    2017 8:00 AM Cesar Burroughs MD [facility] [phone]      Goals (5 Years of Data)     None      [facility]     [facility] On Call Nurse Care Line -  Assistance  Registered nurses in the [facility] On Call Center provide clinical advisement, health education, appointment booking, and other advisory services.  Call for this free service at [phone].             Medications           Message regarding Medications     Verify the changes and/or additions to your medication regime listed below are the same as discussed with your clinician today.  If any of these changes or additions are incorrect, please notify your healthcare provider.             Verify that the below list of medications is an accurate representation of the medications you are currently taking.  If none reported, the list may be blank. If incorrect, please contact your healthcare provider. Carry this list with you in case of emergency.           Current Medications     acetaminophen (TYLENOL) 500 MG tablet Take 500 mg by mouth every 6 (six) hours as needed for Pain.     aliskiren (TEKTURNA) 300 MG Tab Take 1 tablet (300 mg total) by mouth once daily.    ascorbic acid (VITAMIN C) 100 MG tablet Take 100 mg by mouth once daily.    B INFANTIS/B ANI/B JOSE/B BIFID (PROBIOTIC 4X ORAL) Take 1 tablet by mouth daily    chlorthalidone (HYGROTEN) 25 MG Tab Take 25 mg by mouth once daily.    coenzyme Q10 (CO Q-10)  100 mg capsule Take 100 mg by mouth once daily.    fluticasone (FLONASE) 50 mcg/actuation nasal spray USE 1 SPRAY IN EACH NOSTRIL ONCE DAILY    gabapentin (NEURONTIN) 100 MG capsule Take 1 capsule (100 mg total) by mouth 3 (three) times daily.    glucosamine-chondroitin 500-400 mg tablet Take 1 tablet by mouth 2 (two) times daily.     labetalol (NORMODYNE) 100 MG tablet Take 1 tablet (100 mg total) by mouth every 12 (twelve) hours. Take one in am and two in the pm    lorazepam (ATIVAN) 0.5 MG tablet Take one half tablet twice daily    multivitamin capsule Take 1 capsule by mouth once daily.    omeprazole (PRILOSEC OTC) 20 MG tablet Take 20 mg by mouth once daily.      pravastatin (PRAVACHOL) 40 MG tablet Take 1 tablet (40 mg total) by mouth once daily.    UNABLE TO FIND once daily. OTC EYE DROP; Systane           Clinical Reference Information           Allergies as of 2/23/2017     Voltaren [Diclofenac Sodium]    Clarithromycin    Losartan    Flexeril [Cyclobenzaprine]    Iodine And Iodide Containing Products    Lisinopril    Norvasc [Amlodipine]    Tramadol    Metoprolol    Sulfa (Sulfonamide Antibiotics)    Verapamil (Bulk)      Immunizations Administered on Date of Encounter - 2/23/2017     None      Language Assistance Services     ATTENTION: Language assistance services are available, free of charge. Please call [phone].      ATENCIÓN: Si latha gusman, tiene a perez disposición servicios gratuitos de asistencia lingüística. Llame al [phone].     CHÚ Ý: N?u b?n nói Ti?ng Vi?t, có các d?ch v? h? tr? ngôn ng? mi?n phí dành cho b?n. G?i s? [phone].         [facility] complies with applicable Federal civil rights laws and does not discriminate on the basis of race, color, national origin, age, disability, or sex.

## 2017-02-23 NOTE — PROGRESS NOTES
Subjective:      Patient ID: Gerda Lai is a 84 y.o. female.    Chief Complaint: No chief complaint on file.    HPI  84 year old female presents with chief complaint of left hip pain x few months. She denies trauma. She reports pain at the buttock and it goes down the back of her leg. Sometimes she has a lot of pain with walking. She takes tylenol but is unsure if it helps. She received a trochanteric bursitis injection last month that did not help. She reports intermittent groin and LBP. She does not use assistive devices. She had right ELZBIETA around 2000 by Dr. Antoine.   Review of Systems   Constitution: Negative for chills, fever and night sweats.   Cardiovascular: Negative for chest pain.   Respiratory: Negative for cough and shortness of breath.    Hematologic/Lymphatic: Does not bruise/bleed easily.   Skin: Negative for color change.   Gastrointestinal: Negative for heartburn.   Genitourinary: Negative for dysuria.   Neurological: Negative for numbness and paresthesias.   Psychiatric/Behavioral: Negative for altered mental status.   Allergic/Immunologic: Negative for persistent infections.         Objective:            General    Vitals reviewed.  Constitutional: She is oriented to person, place, and time. She appears well-developed and well-nourished.   Cardiovascular: Normal rate.    Neurological: She is alert and oriented to person, place, and time.         Left Hip Exam     Tenderness   The patient tender to palpation of the trochanteric bursa.    Range of Motion   The patient has normal left hip ROM.    Muscle Strength   The patient has normal left hip strength.     Tests   Pain w/ forced external rotation (FADIR): absent  Stinchfield test: negative  Log Roll: negative    Other   Sensation: normal    Comments:  No pain with hip ROM.           Vascular Exam       Left Pulses  Dorsalis Pedis:      2+              X-ray: reviewed by myself. Left hip joint space appears well maintained. Right ELZBIETA in good  position.         Assessment:       Encounter Diagnoses   Name Primary?    Trochanteric bursitis of left hip Yes    Sciatica of left side           Plan:       Discussed treatment options with patient. It is too soon for her to receive another hip bursa injection. Explained that some of her pain is likely coming from her back. Order placed for PT. Prescription for medrol dose pack sent to pharmacy. If symptoms do not improve, we will have her f/u with back/spine PA.

## 2017-02-23 NOTE — LETTER
February 23, 2017      Demario Sellers MD  [address] [facility]  [address]  [facility] [address]           [facility]  [address]  [place] [address]  Phone: [phone]          Patient: Gerda Lai   MR Number: [id number]   YOB: 1933   Date of Visit: 2/23/2017       Dear Dr. Demario Sellers:    Thank you for referring Gerda Lai to me for evaluation. Attached you will find relevant portions of my assessment and plan of care.    If you have questions, please do not hesitate to call me. I look forward to following Gerda Lai along with you.    Sincerely,    Bianca Hu PA-C    Enclosure  CC:  No Recipients    If you would like to receive this communication electronically, please contact [email or web address] or [phone] to request more information on [email or web address] Link access.    For providers and/or their staff who would like to refer a patient to [facility], please contact us through our one-stop-shop provider referral line, [facility] , at [phone].    If you feel you have received this communication in error or would no longer like to receive these types of communications, please e-mail [email or web address]

## 2017-02-24 ENCOUNTER — TELEPHONE (OUTPATIENT)
Dept: ORTHOPEDICS | Facility: CLINIC | Age: 82
End: 2017-02-24

## 2017-02-24 NOTE — TELEPHONE ENCOUNTER
Patient states she cannot take the medrol dose pack because prednisone has caused fever for her in the past. She was told to avoid nsaids. She will continue tylenol as needed. She has been icing which also helps.

## 2017-02-25 ENCOUNTER — NURSE TRIAGE (OUTPATIENT)
Dept: ADMINISTRATIVE | Facility: CLINIC | Age: 82
End: 2017-02-25

## 2017-02-25 NOTE — TELEPHONE ENCOUNTER
"    Reason for Disposition   Scalp swelling, bruise or pain (all triage questions negative)    Answer Assessment - Initial Assessment Questions  1. MECHANISM: "How did the injury happen?" For falls, ask: "What height did you fall from?" and "What surface did you fall against?"      Did not fall  2. ONSET: "When did the injury happen?" (Minutes or hours ago)    While moving jazzmine in book case, shelf fell and hit her on her head an hour ago  3. NEUROLOGIC SYMPTOMS: "Was there any loss of consciousness?" "Are there any other neurological symptoms?"    Did not lose conscious, states she has no symptoms  4. MENTAL STATUS: "Does the person know who he is, who you are, and where he is?"      yes  5. LOCATION: "What part of the head was hit?"   On the top left side  6. SCALP APPEARANCE: "What does the scalp look like? Is it bleeding now?" If so, ask: "Is it difficult to stop?"      No bleeding  7. SIZE: For cuts, bruises, or swelling, ask: "How large is it?" (e.g., inches or centimeters)    2x2 inches approximately  8. PAIN: "Is there any pain?" If so, ask: "How bad is it?"  (e.g., Scale 1-10; or mild, moderate, severe)  0-1  9. TETANUS: For any breaks in the skin, ask: "When was the last tetanus booster?"      No break in the skin  10. OTHER SYMPTOMS: "Do you have any other symptoms?" (e.g., neck pain, vomiting)      none  11. PREGNANCY: "Is there any chance you are pregnant?" "When was your last menstrual period?"        -     Pt. Was oriented, and stated that she believes that she is ok and has no symptoms, and states that she did not se a bruise in the area where the herself hit her head. As per protocol advised pt. To call back if she experiences confusion, dizziness, headache, or worsening of symptoms.  Voiced understanding.    Protocols used: ST HEAD INJURY-A-      "

## 2017-03-01 ENCOUNTER — CLINICAL SUPPORT (OUTPATIENT)
Dept: REHABILITATION | Facility: HOSPITAL | Age: 82
End: 2017-03-01
Attending: ORTHOPAEDIC SURGERY
Payer: MEDICARE

## 2017-03-01 DIAGNOSIS — M25.552 LEFT HIP PAIN: ICD-10-CM

## 2017-03-01 PROCEDURE — 97162 PT EVAL MOD COMPLEX 30 MIN: CPT | Mod: PO

## 2017-03-01 PROCEDURE — G8979 MOBILITY GOAL STATUS: HCPCS | Mod: CK,PO

## 2017-03-01 PROCEDURE — G8978 MOBILITY CURRENT STATUS: HCPCS | Mod: CK,PO

## 2017-03-01 PROCEDURE — 97110 THERAPEUTIC EXERCISES: CPT | Mod: PO

## 2017-03-01 NOTE — PROGRESS NOTES
OUTPATIENT PHYSICAL THERAPY   PATIENT EVALUATION        Name: Gerda Lai  Clinic Number: [id number]        Diagnosis:   Encounter Diagnosis   Name Primary?    Left hip pain      Physician: Cesar Segundo MD  Treatment Orders: PT Eval and Treat    Past Medical History:   Diagnosis Date    Arthritis     Basal cell carcinoma 09/2016    right post auricular neck     Breast cancer     Cataract     Fibromyalgia     Hyperlipidemia     Hypertension     Personal history of colonic polyps     SCC (squamous cell carcinoma) 2015    R chest    SCC (squamous cell carcinoma) 2016    left medial shoulder    Squamous cell carcinoma 2015    right forearm    Thyroid disease     Vaginitis      Current Outpatient Prescriptions   Medication Sig    acetaminophen (TYLENOL) 500 MG tablet Take 500 mg by mouth every 6 (six) hours as needed for Pain.     aliskiren (TEKTURNA) 300 MG Tab Take 1 tablet (300 mg total) by mouth once daily.    ascorbic acid (VITAMIN C) 100 MG tablet Take 100 mg by mouth once daily.    B INFANTIS/B ANI/B JOSE/B BIFID (PROBIOTIC 4X ORAL) Take 1 tablet by mouth daily    chlorthalidone (HYGROTEN) 25 MG Tab Take 25 mg by mouth once daily.    coenzyme Q10 (CO Q-10) 100 mg capsule Take 100 mg by mouth once daily.    fluticasone (FLONASE) 50 mcg/actuation nasal spray USE 1 SPRAY IN EACH NOSTRIL ONCE DAILY (Patient taking differently: as needed. )    gabapentin (NEURONTIN) 100 MG capsule Take 1 capsule (100 mg total) by mouth 3 (three) times daily.    glucosamine-chondroitin 500-400 mg tablet Take 1 tablet by mouth 2 (two) times daily.     labetalol (NORMODYNE) 100 MG tablet Take 1 tablet (100 mg total) by mouth every 12 (twelve) hours. Take one in am and two in the pm (Patient taking differently: Take 100 mg by mouth every 12 (twelve) hours. Take one in am and one in the pm)    lorazepam (ATIVAN) 0.5 MG tablet Take one half tablet twice daily    methylPREDNISolone (MEDROL DOSEPACK) 4 mg  "tablet use as directed    multivitamin capsule Take 1 capsule by mouth once daily.    omeprazole (PRILOSEC OTC) 20 MG tablet Take 20 mg by mouth once daily.      pravastatin (PRAVACHOL) 40 MG tablet Take 1 tablet (40 mg total) by mouth once daily.    UNABLE TO FIND once daily. OTC EYE DROP; Systane     No current facility-administered medications for this visit.      Review of patient's allergies indicates:   Allergen Reactions    Voltaren [diclofenac sodium] Other (See Comments)     Drops blood pressure    Clarithromycin Other (See Comments)     Weak, extreme fatigue. dizziness    Losartan Rash    Flexeril [cyclobenzaprine] Other (See Comments)     Dizziness    Iodine and iodide containing products     Lisinopril Other (See Comments)     Cough and sensation of throat swelling/?angioedema    Norvasc [amlodipine]      Not working      Tramadol      Dizzy and weak    Metoprolol Swelling     Tightness in throat    Sulfa (sulfonamide antibiotics) Rash    Verapamil (bulk) Palpitations         Precautions: standard    Evaluation Date: 3/1/2017  Visit # authorized: 20  Authorization period: 12/31/2017  Plan of care Expiration: 4/26/17      Subjective     Onset Date: chronic  Prior Level of Function: independent   Current level of Function: modified independent   Social History: lives in a 1 story house with 1 step to enter; does not own any shower equipment. She owns a walker and cane, currently does not use.       History of Present Illness: Gerda is a 84 y.o. female that presents to [facility] secondary to low back pain and leg pain. Gerda states she has "terrible arthritis" and fibromyalgia, but more recently she states she has trouble with her legs and at times she feels as if she cant walk. She does not exercise. She received an injection in her left hip a 6 weeks ago and she thinks they "missed the mp," so she states she got minimal relief from it. She has had injections in the past " "and had good results lasting a couple weeks. She went to the [facility] in January and after lots of walking, she had lots of pain following her trip. She has arthritis in her back but she doesn't feel the back is the problem but at times it will affect both legs, with radiating pain down the left leg to the ankle behind the leg; and occasionally on the right. Walking will induce the pain; at times it can occur during sitting. She states she had a right hip replacement. She states that she is not a good sleeper in general, she reads at night; on occasion she will wake up from the pain. Denies falls.     Imaging: X-ray taken and revealed   Results:Is isn't changed prior. Continued straightening of the normal lumbar lordosis with grade 1 anterolisthesis of L4 on L5 and retrolisthesis of L1 on L2. Endplate degenerative disease most prominent at L1/L2 and L2/L3 levels. Allowing for degenerative changes the lumbar vertebral heights and contours are stable without evidence for acute fracture. Continued facet degenerative change at nearly all levels. Further evaluation as warrented clinically...    Pain: current 1/10, worst 7/10, best 1/10, Aching, Burning, Tingling and "hurts like hell", constant  Radicular symptoms: right and left sided radicular symptoms to the ankle  Aggravating factors: walking, sitting for long durations,  Easing factors: ice on lateral hip, thigh, and calf,and takes tylenol     Pts goals: to be pain free; be more confident about getting up in the morning without pain      No cultural, environmental, or spiritual barriers identified to treatment or learning.    Objective     Observation: polite and pleasant; alert and oriented     Posture:  Unremarkable, flattened L/S    Gait: ambulates with narrow base of support with occasional scissoring of the right over left; deviates off midline to the left; antalgic gait with lateral trunk leans     Lumbar Range of Motion:    Degrees Pain   Flexion Full ROM " with flattened L/S   --        Extension 75%   --        Left Side Bending 3 inches above knee (decreased C curve) Stretching in L side of thigh        Right Side Bending 2 inches above knee (decreased C curve) --        Left rotation   80% --        Right Rotation   80% --             Lower Extremity Strength  Right LE  Left LE    Knee extension: 4+/5 Knee extension: 5/5   Knee flexion: 4+/5 Knee flexion: 4+/5   Hip flexion: 4/5 Hip flexion: 4+/5   Hip extension:  3/5 Hip extension: 3/5   Hip abduction: 3+/5 Hip abduction: 3-/5   Hip adduction: 2/5 Hip adduction 2+/5   Ankle dorsiflexion: 4+/5 Ankle dorsiflexion: 4+/5       Dermatomes:   Right Left Comment   L2 intact impaired - minimum    L3 intact intact    L4 intact impaired - minimum    L5 intact impaired - minimum    S1 intact intact    S2 intact intact      Glut firing appropriately with hip extension      Unable to re-produce patient's symptoms at this time; patient unable to provide accurate description of any type of pain or tenderness    Neuro Dynamic Testing:    Sciatic nerve:      SLR: negative for neurotension B, hamstring tightness noted B    Slump: (R) negative (stretch only), (L) negative (some pain in calf due to tightness)       Femoral Nerve:    Femoral nerve test: negative (B)      Joint Mobility: NT at this time    Palpation: Tenderness along bilateral IT bands and lateral hamstring and distal hamstrings, L lateral knee joint line, B TFLs and over GT and piriformis      Flexibility:=   Ely's test: R = muscle guarding at 90 degrees ; L = muscle guarding at 90 degrees   Popliteal Angle: R = positive (lacking approx 45) degrees; L = positive (lacking approx 45) degrees    Functional Limitations Reports - G Codes  Category: MOBILITY  CMS Impairment/Limitation/Restriction for FOTO Hip Survey  Status Limitation G-Code CMS Severity Modifier  Intake 48% 52% Current Status CK - At least 40 percent but less than 60 percent  Predicted 55% 45% Goal Status+  CK - At least 40 percent but less than 60 percent      PT Evaluation Completed? Yes  Discussed Plan of Care with patient: Yes    TREATMENT:  Gerda received therapeutic exercises to develop strength and endurance, flexibility for 10 minutes including:see exercise sheet     Pelvic tilt 20 x 5s  Supine clamshells 2 x 10 OTB  Hip adduction with ball 20 x 5s  Hamstring 90-90 3 x 30s    HEP provided: Issued handout of above activities.  Instructed pt. Regarding: Proper technique with all exercises. Pt demo good understanding of the education provided. Gerda demonstrated good return demonstration of activities.      Assessment     Gerda is a 84 y.o. female referred to outpatient physical therapy low back pain and nerve pain into bilateral legs. Demonstrates impairments including: limitations as described in the problem list. Pt prognosis is Fair. Positive prognostic factors include good attitude towards PT, previous successful bouts of PT. Negative prognostic factors include age, chronicity of condition, fibromyalgia. Pt will benefit from skilled outpatient physical therapy to address the above stated deficits, provide pt/family education, and to maximize pt's level of independence.     Medical necessity is demonstrated by the following IMPAIRMENTS/PROBLEMS:  weakness, impaired functional mobility, gait instability, pain, decreased ROM, impaired muscle length and poor neurotension length relationship  Co-morbidities and personal factors: coronary artery disease, hypertension, osteoarthritis and fibromyalgia, RA, chronic back pain.  Activity Limitations: walking long periods, standing long periods.  Participation restrictions: community outings.   Clinical presentation: evolving and changing  Complexity: moderate    Rehab Potiential: fair    Anticipated Barriers for physical therapy: age, chronicity of condition     GOALS:    Short Term Goals:  4 weeks    - Pt to increase popliteal angle to within < or = 20 degrees of  full ROM in order to show improved flexibility and posture.   - Pt to increase lumbar left sidebending ROM to at least 2 inches above patella in order to show improved mobility.  - Pt to be independent and consistent with issued HEP in order to promote carryover between therapy sessions.  - Pt will be able to perform and hold an ab brace for 10 reps of 10 second hold with normal breathing and no cuing in order to demo improved core recruitment.    Long Term Goals: 8 weeks    -Pt will report a decrease in LBP to 0/10  to increase tolerance for ADLs.  -Pt will increase hip strength by 1 ms grade in order to increase tolerance to ADLs and functional activities.  -Pt will report at CK level (< or = to 45% impaired) on FOTO score for low back pain disability to demonstrate decrease in disability and improvement in back pain.  - Pt to be Independent with updated HEP in order to maintain gains following discharge from PT.        Plan     Recommended Treatment Plan: Pt will be treated by physical therapy 1-3 times a week for 8 weeks for pt education, HEP, therapeutic exercises, neuromuscular re-education, manual therapy, modalities prn to achieve established goals.  Gerda may at times be seen by a PTA as part of the Rehab Team.     Other Recommendations: none      Therapist: Ashley Holstein, PT    I CERTIFY THE NEED FOR THESE SERVICES FURNISHED UNDER THIS PLAN OF TREATMENT AND WHILE UNDER MY CARE    Physician's comments: ________________________________________________________________________________________________________________________________________________      Physician's Name: ___________________________________

## 2017-03-01 NOTE — PLAN OF CARE
OUTPATIENT PHYSICAL THERAPY   PATIENT EVALUATION        Name: Gerda Lai  Clinic Number: [id number]        Diagnosis:   Encounter Diagnosis   Name Primary?    Left hip pain      Physician: Cesar Segundo MD  Treatment Orders: PT Eval and Treat    Past Medical History:   Diagnosis Date    Arthritis     Basal cell carcinoma 09/2016    right post auricular neck     Breast cancer     Cataract     Fibromyalgia     Hyperlipidemia     Hypertension     Personal history of colonic polyps     SCC (squamous cell carcinoma) 2015    R chest    SCC (squamous cell carcinoma) 2016    left medial shoulder    Squamous cell carcinoma 2015    right forearm    Thyroid disease     Vaginitis      Current Outpatient Prescriptions   Medication Sig    acetaminophen (TYLENOL) 500 MG tablet Take 500 mg by mouth every 6 (six) hours as needed for Pain.     aliskiren (TEKTURNA) 300 MG Tab Take 1 tablet (300 mg total) by mouth once daily.    ascorbic acid (VITAMIN C) 100 MG tablet Take 100 mg by mouth once daily.    B INFANTIS/B ANI/B JOSE/B BIFID (PROBIOTIC 4X ORAL) Take 1 tablet by mouth daily    chlorthalidone (HYGROTEN) 25 MG Tab Take 25 mg by mouth once daily.    coenzyme Q10 (CO Q-10) 100 mg capsule Take 100 mg by mouth once daily.    fluticasone (FLONASE) 50 mcg/actuation nasal spray USE 1 SPRAY IN EACH NOSTRIL ONCE DAILY (Patient taking differently: as needed. )    gabapentin (NEURONTIN) 100 MG capsule Take 1 capsule (100 mg total) by mouth 3 (three) times daily.    glucosamine-chondroitin 500-400 mg tablet Take 1 tablet by mouth 2 (two) times daily.     labetalol (NORMODYNE) 100 MG tablet Take 1 tablet (100 mg total) by mouth every 12 (twelve) hours. Take one in am and two in the pm (Patient taking differently: Take 100 mg by mouth every 12 (twelve) hours. Take one in am and one in the pm)    lorazepam (ATIVAN) 0.5 MG tablet Take one half tablet twice daily    methylPREDNISolone (MEDROL DOSEPACK) 4 mg  "tablet use as directed    multivitamin capsule Take 1 capsule by mouth once daily.    omeprazole (PRILOSEC OTC) 20 MG tablet Take 20 mg by mouth once daily.      pravastatin (PRAVACHOL) 40 MG tablet Take 1 tablet (40 mg total) by mouth once daily.    UNABLE TO FIND once daily. OTC EYE DROP; Systane     No current facility-administered medications for this visit.      Review of patient's allergies indicates:   Allergen Reactions    Voltaren [diclofenac sodium] Other (See Comments)     Drops blood pressure    Clarithromycin Other (See Comments)     Weak, extreme fatigue. dizziness    Losartan Rash    Flexeril [cyclobenzaprine] Other (See Comments)     Dizziness    Iodine and iodide containing products     Lisinopril Other (See Comments)     Cough and sensation of throat swelling/?angioedema    Norvasc [amlodipine]      Not working      Tramadol      Dizzy and weak    Metoprolol Swelling     Tightness in throat    Sulfa (sulfonamide antibiotics) Rash    Verapamil (bulk) Palpitations         Precautions: standard    Evaluation Date: 3/1/2017  Visit # authorized: 20  Authorization period: 12/31/2017  Plan of care Expiration: 4/26/17      Subjective     Onset Date: chronic  Prior Level of Function: independent   Current level of Function: modified independent   Social History: lives in a 1 story house with 1 step to enter; does not own any shower equipment. She owns a walker and cane, currently does not use.       History of Present Illness: Gerda is a 84 y.o. female that presents to [facility] secondary to low back pain and leg pain. Gerda states she has "terrible arthritis" and fibromyalgia, but more recently she states she has trouble with her legs and at times she feels as if she cant walk. She does not exercise. She received an injection in her left hip a 6 weeks ago and she thinks they "missed the mp," so she states she got minimal relief from it. She has had injections in the past " "and had good results lasting a couple weeks. She went to the [facility] in January and after lots of walking, she had lots of pain following her trip. She has arthritis in her back but she doesn't feel the back is the problem but at times it will affect both legs, with radiating pain down the left leg to the ankle behind the leg; and occasionally on the right. Walking will induce the pain; at times it can occur during sitting. She states she had a right hip replacement. She states that she is not a good sleeper in general, she reads at night; on occasion she will wake up from the pain. Denies falls.     Imaging: X-ray taken and revealed   Results:Is isn't changed prior. Continued straightening of the normal lumbar lordosis with grade 1 anterolisthesis of L4 on L5 and retrolisthesis of L1 on L2. Endplate degenerative disease most prominent at L1/L2 and L2/L3 levels. Allowing for degenerative changes the lumbar vertebral heights and contours are stable without evidence for acute fracture. Continued facet degenerative change at nearly all levels. Further evaluation as warrented clinically...    Pain: current 1/10, worst 7/10, best 1/10, Aching, Burning, Tingling and "hurts like hell", constant  Radicular symptoms: right and left sided radicular symptoms to the ankle  Aggravating factors: walking, sitting for long durations,  Easing factors: ice on lateral hip, thigh, and calf,and takes tylenol     Pts goals: to be pain free; be more confident about getting up in the morning without pain      No cultural, environmental, or spiritual barriers identified to treatment or learning.    Objective     Observation: polite and pleasant; alert and oriented     Posture:  Unremarkable, flattened L/S    Gait: ambulates with narrow base of support with occasional scissoring of the right over left; deviates off midline to the left; antalgic gait with lateral trunk leans     Lumbar Range of Motion:    Degrees Pain   Flexion Full ROM " with flattened L/S   --        Extension 75%   --        Left Side Bending 3 inches above knee (decreased C curve) Stretching in L side of thigh        Right Side Bending 2 inches above knee (decreased C curve) --        Left rotation   80% --        Right Rotation   80% --             Lower Extremity Strength  Right LE  Left LE    Knee extension: 4+/5 Knee extension: 5/5   Knee flexion: 4+/5 Knee flexion: 4+/5   Hip flexion: 4/5 Hip flexion: 4+/5   Hip extension:  3/5 Hip extension: 3/5   Hip abduction: 3+/5 Hip abduction: 3-/5   Hip adduction: 2/5 Hip adduction 2+/5   Ankle dorsiflexion: 4+/5 Ankle dorsiflexion: 4+/5       Dermatomes:   Right Left Comment   L2 intact impaired - minimum    L3 intact intact    L4 intact impaired - minimum    L5 intact impaired - minimum    S1 intact intact    S2 intact intact      Glut firing appropriately with hip extension      Unable to re-produce patient's symptoms at this time; patient unable to provide accurate description of any type of pain or tenderness    Neuro Dynamic Testing:    Sciatic nerve:      SLR: negative for neurotension B, hamstring tightness noted B    Slump: (R) negative (stretch only), (L) negative (some pain in calf due to tightness)       Femoral Nerve:    Femoral nerve test: negative (B)      Joint Mobility: NT at this time    Palpation: Tenderness along bilateral IT bands and lateral hamstring and distal hamstrings, L lateral knee joint line, B TFLs and over GT and piriformis      Flexibility:=   Ely's test: R = muscle guarding at 90 degrees ; L = muscle guarding at 90 degrees   Popliteal Angle: R = positive (lacking approx 45) degrees; L = positive (lacking approx 45) degrees    Functional Limitations Reports - G Codes  Category: MOBILITY  CMS Impairment/Limitation/Restriction for FOTO Hip Survey  Status Limitation G-Code CMS Severity Modifier  Intake 48% 52% Current Status CK - At least 40 percent but less than 60 percent  Predicted 55% 45% Goal Status+  CK - At least 40 percent but less than 60 percent      PT Evaluation Completed? Yes  Discussed Plan of Care with patient: Yes    TREATMENT:  Gerda received therapeutic exercises to develop strength and endurance, flexibility for 10 minutes including:see exercise sheet     Pelvic tilt 20 x 5s  Supine clamshells 2 x 10 OTB  Hip adduction with ball 20 x 5s  Hamstring 90-90 3 x 30s    HEP provided: Issued handout of above activities.  Instructed pt. Regarding: Proper technique with all exercises. Pt demo good understanding of the education provided. Gerda demonstrated good return demonstration of activities.      Assessment     Gerda is a 84 y.o. female referred to outpatient physical therapy low back pain and nerve pain into bilateral legs. Demonstrates impairments including: limitations as described in the problem list. Pt prognosis is Fair. Positive prognostic factors include good attitude towards PT, previous successful bouts of PT. Negative prognostic factors include age, chronicity of condition, fibromyalgia. Pt will benefit from skilled outpatient physical therapy to address the above stated deficits, provide pt/family education, and to maximize pt's level of independence.     Medical necessity is demonstrated by the following IMPAIRMENTS/PROBLEMS:  weakness, impaired functional mobility, gait instability, pain, decreased ROM, impaired muscle length and poor neurotension length relationship  Co-morbidities and personal factors: coronary artery disease, hypertension, osteoarthritis and fibromyalgia, RA, chronic back pain.  Activity Limitations: walking long periods, standing long periods.  Participation restrictions: community outings.   Clinical presentation: evolving and changing  Complexity: moderate    Rehab Potiential: fair    Anticipated Barriers for physical therapy: age, chronicity of condition     GOALS:    Short Term Goals:  4 weeks    - Pt to increase popliteal angle to within < or = 20 degrees of  full ROM in order to show improved flexibility and posture.   - Pt to increase lumbar left sidebending ROM to at least 2 inches above patella in order to show improved mobility.  - Pt to be independent and consistent with issued HEP in order to promote carryover between therapy sessions.  - Pt will be able to perform and hold an ab brace for 10 reps of 10 second hold with normal breathing and no cuing in order to demo improved core recruitment.    Long Term Goals: 8 weeks    -Pt will report a decrease in LBP to 0/10  to increase tolerance for ADLs.  -Pt will increase hip strength by 1 ms grade in order to increase tolerance to ADLs and functional activities.  -Pt will report at CK level (< or = to 45% impaired) on FOTO score for low back pain disability to demonstrate decrease in disability and improvement in back pain.  - Pt to be Independent with updated HEP in order to maintain gains following discharge from PT.        Plan     Recommended Treatment Plan: Pt will be treated by physical therapy 1-3 times a week for 8 weeks for pt education, HEP, therapeutic exercises, neuromuscular re-education, manual therapy, modalities prn to achieve established goals.  Gerda may at times be seen by a PTA as part of the Rehab Team.     Other Recommendations: none      Therapist: Ashley Holstein, PT    I CERTIFY THE NEED FOR THESE SERVICES FURNISHED UNDER THIS PLAN OF TREATMENT AND WHILE UNDER MY CARE    Physician's comments: ________________________________________________________________________________________________________________________________________________      Physician's Name: ___________________________________

## 2017-03-07 ENCOUNTER — OFFICE VISIT (OUTPATIENT)
Dept: FAMILY MEDICINE | Facility: CLINIC | Age: 82
End: 2017-03-07
Payer: MEDICARE

## 2017-03-07 ENCOUNTER — CLINICAL SUPPORT (OUTPATIENT)
Dept: REHABILITATION | Facility: HOSPITAL | Age: 82
End: 2017-03-07
Attending: ORTHOPAEDIC SURGERY
Payer: MEDICARE

## 2017-03-07 VITALS
HEART RATE: 72 BPM | HEIGHT: 64 IN | TEMPERATURE: 98 F | BODY MASS INDEX: 25.06 KG/M2 | DIASTOLIC BLOOD PRESSURE: 64 MMHG | WEIGHT: 146.81 LBS | SYSTOLIC BLOOD PRESSURE: 156 MMHG

## 2017-03-07 DIAGNOSIS — S09.90XA HEAD INJURY DUE TO TRAUMA, INITIAL ENCOUNTER: Primary | ICD-10-CM

## 2017-03-07 DIAGNOSIS — S09.90XA HEAD TRAUMA, INITIAL ENCOUNTER: ICD-10-CM

## 2017-03-07 DIAGNOSIS — M25.552 LEFT HIP PAIN: ICD-10-CM

## 2017-03-07 PROCEDURE — 97110 THERAPEUTIC EXERCISES: CPT | Mod: PO | Performed by: PHYSICAL THERAPIST

## 2017-03-07 PROCEDURE — 99214 OFFICE O/P EST MOD 30 MIN: CPT | Mod: S$PBB,,, | Performed by: FAMILY MEDICINE

## 2017-03-07 PROCEDURE — 99999 PR PBB SHADOW E&M-EST. PATIENT-LVL IV: CPT | Mod: PBBFAC,,, | Performed by: FAMILY MEDICINE

## 2017-03-07 PROCEDURE — 99214 OFFICE O/P EST MOD 30 MIN: CPT | Mod: PBBFAC,PO | Performed by: FAMILY MEDICINE

## 2017-03-07 NOTE — MR AVS SNAPSHOT
[facility]  [address] [facility], [address]  [facility] [address]  Phone: [phone]  Fax: [phone]                  Gerda Lai   3/7/2017 3:40 PM   Office Visit    Description:  Female : 1933   Provider:  Lucy Turner MD   Department:  [facility]           Reason for Visit     book fell on head, elevated blood pressure           Diagnoses this Visit        Comments    Head injury due to trauma, initial encounter    -  Primary            To Do List           Future Appointments        Provider Department Dept Phone    3/10/2017 1:00 PM Milton Santana PTA [facility] [phone]    3/14/2017 1:00 PM Ashley Holstein, PT [facility] [phone]    3/16/2017 8:30 AM [facility], [facility] [phone]    3/17/2017 1:00 PM Milton Santana PTA [facility] [phone]    3/21/2017 1:00 PM Ashley Holstein, PT [facility] [phone]      Goals (5 Years of Data)     None      [facility]     [facility] -  Assistance  Registered nurses in the [facility] provide clinical advisement, health education, appointment booking, and other advisory services.  Call for this free service at [phone].             Medications           Message regarding Medications     Verify the changes and/or additions to your medication regime listed below are the same as discussed with your clinician today.  If any of these changes or additions are incorrect, please notify your healthcare provider.        STOP taking these medications     methylPREDNISolone (MEDROL DOSEPACK) 4 mg tablet use as directed           Verify that the below list of medications is an accurate representation of the medications you are currently taking.  If none reported, the list may be blank. If incorrect, please contact your healthcare provider. Carry this list with  "you in case of emergency.           Current Medications     acetaminophen (TYLENOL) 500 MG tablet Take 500 mg by mouth every 6 (six) hours as needed for Pain.     aliskiren (TEKTURNA) 300 MG Tab Take 1 tablet (300 mg total) by mouth once daily.    ascorbic acid (VITAMIN C) 100 MG tablet Take 100 mg by mouth once daily.    B INFANTIS/B ANI/B JOSE/B BIFID (PROBIOTIC 4X ORAL) Take 1 tablet by mouth daily    chlorthalidone (HYGROTEN) 25 MG Tab Take 25 mg by mouth once daily.    coenzyme Q10 (CO Q-10) 100 mg capsule Take 100 mg by mouth once daily.    fluticasone (FLONASE) 50 mcg/actuation nasal spray USE 1 SPRAY IN EACH NOSTRIL ONCE DAILY    gabapentin (NEURONTIN) 100 MG capsule Take 1 capsule (100 mg total) by mouth 3 (three) times daily.    glucosamine-chondroitin 500-400 mg tablet Take 1 tablet by mouth 2 (two) times daily.     labetalol (NORMODYNE) 100 MG tablet Take 1 tablet (100 mg total) by mouth every 12 (twelve) hours. Take one in am and two in the pm    lorazepam (ATIVAN) 0.5 MG tablet Take one half tablet twice daily    multivitamin capsule Take 1 capsule by mouth once daily.    omeprazole (PRILOSEC OTC) 20 MG tablet Take 20 mg by mouth once daily.      pravastatin (PRAVACHOL) 40 MG tablet Take 1 tablet (40 mg total) by mouth once daily.    UNABLE TO FIND once daily. OTC EYE DROP; Systane           Clinical Reference Information           Your Vitals Were     BP Pulse Temp Height Weight BMI    156/64 (BP Location: Left arm) 72 98.3 °F (36.8 °C) 5' 4" (1.626 m) 66.6 kg (146 lb 13.2 oz) 25.2 kg/m2      Blood Pressure          Most Recent Value    BP  (!)  156/64      Allergies as of 3/7/2017     Voltaren [Diclofenac Sodium]    Clarithromycin    Losartan    Flexeril [Cyclobenzaprine]    Iodine And Iodide Containing Products    Lisinopril    Norvasc [Amlodipine]    Tramadol    Metoprolol    Sulfa (Sulfonamide Antibiotics)    Verapamil (Bulk)      Immunizations Administered on Date of Encounter - 3/7/2017     None "      Orders Placed During Today's Visit     Future Labs/Procedures Expected by Expires    CT Head Without Contrast  3/7/2017 3/7/2018      Language Assistance Services     ATTENTION: Language assistance services are available, free of charge. Please call [phone].      ATENCIÓN: Si habla naseem, tiene a perez disposición servicios gratuitos de asistencia lingüística. Llame al [phone].     CHÚ Ý: N?u b?n nói Ti?ng Vi?t, có các d?ch v? h? tr? ngôn ng? mi?n phí dành cho b?n. G?i s? [phone].         [facility] complies with applicable Federal civil rights laws and does not discriminate on the basis of race, color, national origin, age, disability, or sex.

## 2017-03-07 NOTE — PROGRESS NOTES
"                                                    Physical Therapy Progress Note     Name: Gerda Lai  Clinic Number: [id number]  Diagnosis:   Encounter Diagnosis   Name Primary?    Left hip pain      Physician: Cesar Segundo MD  Treatment Orders: PT Eval and Treat  Past Medical History:   Diagnosis Date    Arthritis     Basal cell carcinoma 09/2016    right post auricular neck     Breast cancer     Cataract     Fibromyalgia     Hyperlipidemia     Hypertension     Personal history of colonic polyps     SCC (squamous cell carcinoma) 2015    R chest    SCC (squamous cell carcinoma) 2016    left medial shoulder    Squamous cell carcinoma 2015    right forearm    Thyroid disease     Vaginitis        Precautions: standard  Evaluation Date: 3/1/2017  Visit # authorized: 2/ 20  Authorization period: 12/31/2017  Plan of care Expiration: 4/26/17    Missed visits:  no show(),  Cancellations()    Subjective   Pt reports: " I was a little sore from the exercises last time"  Pain Scale:  0/10 hip pain    Objective     Patient received individual therapy to increase strength, endurance, ROM, flexibility and core stabilization with 0 patients with activities as follows:     Gerda received therapeutic exercises to develop strength, endurance, ROM, flexibility and core stabilization for 53 minutes including:     Pelvic tilt 30 x 5s  Supine clamshells 3 x 10 OT  Hip adduction with ball 30 x 5s  Hamstring 90-90 3 x 30s  Prone hip ext: 2 x 10  Prone quad stretch: 3 x 45"  Supine marches: 3 x 10        Written Home Exercises Provided: continue with current  Pt demo good understanding of the education provided. Gerda demonstrated good return demonstration of activities.     Education provided re:  No spiritual or educational barriers to learning provided    Pt has no cultural, educational or language barriers to learning provided.  Assessment   Pt tolerated therapy well. Pt reported some hip soreness with HS " stretches, was instructed not to stretch as far and this alleviated her symptoms.  Pt prognosis is Fair. Pt will continue to benefit from skilled outpatient physical therapy to address the deficits listed below in the problem list, provide pt/family education and to maximize pt's level of independence in the home and community environment.   Anticipated barriers to physical therapy: none    Pt's spiritual, cultural and educational needs considered and pt agreeable to plan of care and goals as stated below:     Short Term Goals: 4 weeks     - Pt to increase popliteal angle to within < or = 20 degrees of full ROM in order to show improved flexibility and posture.   - Pt to increase lumbar left sidebending ROM to at least 2 inches above patella in order to show improved mobility.  - Pt to be independent and consistent with issued HEP in order to promote carryover between therapy sessions.  - Pt will be able to perform and hold an ab brace for 10 reps of 10 second hold with normal breathing and no cuing in order to demo improved core recruitment.     Long Term Goals: 8 weeks     -Pt will report a decrease in LBP to 0/10 to increase tolerance for ADLs.  -Pt will increase hip strength by 1 ms grade in order to increase tolerance to ADLs and functional activities.  -Pt will report at CK level (< or = to 45% impaired) on FOTO score for low back pain disability to demonstrate decrease in disability and improvement in back pain.  - Pt to be Independent with updated HEP in order to maintain gains following discharge from PT.     Plan   Continue with established Plan of Care towards PT goals.  Carlos Romero, PT 3/7/2017

## 2017-03-08 ENCOUNTER — HOSPITAL ENCOUNTER (OUTPATIENT)
Dept: RADIOLOGY | Facility: HOSPITAL | Age: 82
Discharge: HOME | End: 2017-03-08
Attending: FAMILY MEDICINE
Payer: MEDICARE

## 2017-03-08 DIAGNOSIS — S09.90XA HEAD INJURY DUE TO TRAUMA, INITIAL ENCOUNTER: ICD-10-CM

## 2017-03-08 PROCEDURE — 70450 CT HEAD/BRAIN W/O DYE: CPT | Mod: 26,GC,, | Performed by: RADIOLOGY

## 2017-03-08 PROCEDURE — 70450 CT HEAD/BRAIN W/O DYE: CPT | Mod: TC

## 2017-03-08 NOTE — PROGRESS NOTES
Subjective:       Patient ID: Gerda Lai is a 84 y.o. female.    Chief Complaint: book fell on head, elevated blood pressure   this incident happened over a week ago and patient is concerned about possible head injury.  Patient suffers with dizziness but denies nausea vomiting or visual disturbance.  Patient is not confused.  Patient is not sleeping excessively.  Patient has no confusion  HPI see above  Review of Systems   Constitutional: Negative.    Eyes: Negative.    Respiratory: Negative.    Cardiovascular: Negative.    Gastrointestinal: Negative.    Endocrine: Negative.    Genitourinary: Negative.    Musculoskeletal: Negative.    Skin: Negative.    Allergic/Immunologic: Negative.    Neurological: Positive for headaches.       Objective:      Physical Exam   Constitutional: She is oriented to person, place, and time. She appears well-developed and well-nourished. No distress.   HENT:   Head: Normocephalic.       Right Ear: Hearing, tympanic membrane, external ear and ear canal normal. Tympanic membrane is not injected. No middle ear effusion.   Left Ear: Hearing, tympanic membrane, external ear and ear canal normal. Tympanic membrane is not injected.  No middle ear effusion.   Nose: Nose normal.   Mouth/Throat: Oropharynx is clear and moist.   blackened area on the gr of contusion that is resolving well   Eyes: Conjunctivae, EOM and lids are normal. Pupils are equal, round, and reactive to light.   Cardiovascular: Normal rate, regular rhythm, normal heart sounds and intact distal pulses.    Pulmonary/Chest: Effort normal and breath sounds normal. No respiratory distress. She has no wheezes. She has no rales. She exhibits no tenderness.   Abdominal: Soft. Bowel sounds are normal. She exhibits no distension. There is no tenderness.   Neurological: She is alert and oriented to person, place, and time. She has normal reflexes. She displays normal reflexes. No cranial nerve deficit. She exhibits normal muscle  tone. Coordination normal.   Skin: Skin is warm and dry. No rash noted. She is not diaphoretic. No erythema. No pallor.   Psychiatric: She has a normal mood and affect. Her behavior is normal. Judgment and thought content normal.   Nursing note and vitals reviewed.      Assessment:       1. Head injury due to trauma, initial encounter     2.     Labile htn  Plan:   Patient's blood pressures appear stable. Patent is concerned about sbral bleeding after the head trauma.  Mostly because of her advanced age and the fact that she did not seek medical care at the time of her head injury.  Continue same medicines per the med card dated 3/7/2017  We'll obtain a CT of the patient's head to rule out any subdural bleeding.  We'll contact the patient results of testing as soon as they are available.  Patient warned about signs of head injury

## 2017-03-10 ENCOUNTER — CLINICAL SUPPORT (OUTPATIENT)
Dept: REHABILITATION | Facility: HOSPITAL | Age: 82
End: 2017-03-10
Attending: ORTHOPAEDIC SURGERY
Payer: MEDICARE

## 2017-03-10 ENCOUNTER — TELEPHONE (OUTPATIENT)
Dept: FAMILY MEDICINE | Facility: CLINIC | Age: 82
End: 2017-03-10

## 2017-03-10 DIAGNOSIS — M25.552 LEFT HIP PAIN: ICD-10-CM

## 2017-03-10 PROCEDURE — 97110 THERAPEUTIC EXERCISES: CPT | Mod: PO

## 2017-03-10 NOTE — PROGRESS NOTES
"                                                    Physical Therapy Progress Note     Name: Gerda Lai  Clinic Number: [id number]  Diagnosis:   Encounter Diagnosis   Name Primary?    Left hip pain      Physician: Cesar Segundo MD  Treatment Orders: PT Eval and Treat  Past Medical History:   Diagnosis Date    Arthritis     Basal cell carcinoma 09/2016    right post auricular neck     Breast cancer     Cataract     Fibromyalgia     Hyperlipidemia     Hypertension     Personal history of colonic polyps     SCC (squamous cell carcinoma) 2015    R chest    SCC (squamous cell carcinoma) 2016    left medial shoulder    Squamous cell carcinoma 2015    right forearm    Thyroid disease     Vaginitis        Precautions: standard  Evaluation Date: 3/1/2017  Visit # authorized: 3/ 20  Authorization period: 12/31/2017  Plan of care Expiration: 4/26/17    Time In: 2:05  Time Out: 3:00  Treatment time: 55  1 on 1 time: 30    Missed visits:  no show(),  Cancellations()    Subjective   Pt reports: fluctuating (L)LE pain. She reports today is not too bad but moderate pain yesterday into (L)LE with some " tingling" reported..  Pain Scale:  3/10 hip pain    Objective     Gerda received therapeutic exercises to develop strength, endurance, ROM, flexibility and core stabilization for 55 minutes including:   Pelvic tilt 30 x 5s  Supine clamshells 3 x 10 GTB  Hip adduction with ball 30 x 5s  Hamstring 90-90 3 x 30s  Prone hip ext: 2 x 10  Prone quad stretch: 3 x 30"  Supine marches: 3 x 10  Sciatic nerve glide on (L) x 20  Bridging 3 x 10 with 3 sec hold  Patient receives long axis distraction 5 x 15 sec (L)LE   Patient receives stick massage to (L) ITB in sidelying x 2 minutes.     Patient declines application of modalities.    Written Home Exercises Provided: continue with current  Pt demo good understanding of the education provided. Gerda demonstrated good return demonstration of activities.   Pt has no " "cultural, educational or language barriers to learning      Assessment     Pt tolerated therapy fairly well.  She reports some (L)LE " tingling" with prone Hip ext ex which was relieved with return to supine and long axis distraction technique. Patient with decreased subjective report of pain to 1-2/10 post Tx. Will monitor and attempt to progress as tolerated.    Pt prognosis is Fair. Pt will continue to benefit from skilled outpatient physical therapy to address the deficits listed below in the problem list, provide pt/family education and to maximize pt's level of independence in the home and community environment.   Anticipated barriers to physical therapy: none    Pt's spiritual, cultural and educational needs considered and pt agreeable to plan of care and goals as stated below:     Short Term Goals: 4 weeks     - Pt to increase popliteal angle to within < or = 20 degrees of full ROM in order to show improved flexibility and posture.   - Pt to increase lumbar left sidebending ROM to at least 2 inches above patella in order to show improved mobility.  - Pt to be independent and consistent with issued HEP in order to promote carryover between therapy sessions.  - Pt will be able to perform and hold an ab brace for 10 reps of 10 second hold with normal breathing and no cuing in order to demo improved core recruitment.     Long Term Goals: 8 weeks     -Pt will report a decrease in LBP to 0/10 to increase tolerance for ADLs.  -Pt will increase hip strength by 1 ms grade in order to increase tolerance to ADLs and functional activities.  -Pt will report at CK level (< or = to 45% impaired) on FOTO score for low back pain disability to demonstrate decrease in disability and improvement in back pain.  - Pt to be Independent with updated HEP in order to maintain gains following discharge from PT.     Plan   Continue with established Plan of Care towards PT goals.  Milton Santana, PTA 3/10/2017   "

## 2017-03-10 NOTE — TELEPHONE ENCOUNTER
Spoke with patient states to disregard the below message she found out who contacted her.  Patient requesting results of CT.  Advised patient of the released results of the CT scan of head.  Patient verbalizes understanding.

## 2017-03-10 NOTE — TELEPHONE ENCOUNTER
----- Message from Linda Bernal sent at 3/10/2017  9:59 AM CST -----  Contact: patient [phone]  [facility]. She recently had a ct scan and thought it may be us calling with test results. Please call pt.

## 2017-03-14 ENCOUNTER — CLINICAL SUPPORT (OUTPATIENT)
Dept: REHABILITATION | Facility: HOSPITAL | Age: 82
End: 2017-03-14
Attending: ORTHOPAEDIC SURGERY
Payer: MEDICARE

## 2017-03-14 DIAGNOSIS — M25.552 LEFT HIP PAIN: ICD-10-CM

## 2017-03-14 PROCEDURE — 97110 THERAPEUTIC EXERCISES: CPT | Mod: PO

## 2017-03-14 NOTE — PROGRESS NOTES
"                                                    Physical Therapy Progress Note     Name: Gerda Lai  Clinic Number: [id number]  Diagnosis:   Encounter Diagnosis   Name Primary?    Left hip pain      Physician: Cesar Segundo MD  Treatment Orders: PT Eval and Treat  Past Medical History:   Diagnosis Date    Arthritis     Basal cell carcinoma 09/2016    right post auricular neck     Breast cancer     Cataract     Fibromyalgia     Hyperlipidemia     Hypertension     Personal history of colonic polyps     SCC (squamous cell carcinoma) 2015    R chest    SCC (squamous cell carcinoma) 2016    left medial shoulder    Squamous cell carcinoma 2015    right forearm    Thyroid disease     Vaginitis        Precautions: standard  Evaluation Date: 3/1/2017  Visit # authorized: 4/ 20  Authorization period: 12/31/2017  Plan of care Expiration: 4/26/17    Time In: 2:05  Time Out: 3:00  Treatment time: 55  1 on 1 time: 30    Missed visits:  no show(),  Cancellations()    Subjective   Pt reports:   She did not do her exercises so she is having more pain prior to session today.  Pain Scale:  5/10 L hip and knee pain    Objective     Gerda received therapeutic exercises to develop strength, endurance, ROM, flexibility and core stabilization for 55 minutes including:   Pelvic tilt 10 x 5s  Pelvic tilt with march 2 x 10  Supine clamshells 3 x 10 GTB  Hip adduction with ball 30 x 5s  Hamstring 90-90 3 x 30s  Prone hip ext: 2 x 10  Prone quad stretch: 3 x 30"  Sciatic nerve glide on (L) x 20  Bridging 3 x 10 with 3 sec hold  Sidelying Clamshells 2 x 10  Patient receives B long leg distraction 5 x 15 sec (L)LE   Patient receives stick massage to (L) ITB in sidelying x 5 minutes.        Written Home Exercises Provided: continue with current  Pt demo good understanding of the education provided. Gerda demonstrated good return demonstration of activities.   Pt has no cultural, educational or language barriers to " learning      Assessment      Pt had good tolerance to tx session today with improvements in signs and symptoms noted following session. Discussed possibility of incorporating FDN into tx session to address restrictions along the IT band. Pt agreeable to try dry needling at upcoming sessions. Tolerated addition of bolded activities above to continue to address core and hip strength.     Pt prognosis is Fair. Pt will continue to benefit from skilled outpatient physical therapy to address the deficits listed below in the problem list, provide pt/family education and to maximize pt's level of independence in the home and community environment.     Anticipated barriers to physical therapy: none    Pt's spiritual, cultural and educational needs considered and pt agreeable to plan of care and goals as stated below:     Short Term Goals: 4 weeks     - Pt to increase popliteal angle to within < or = 20 degrees of full ROM in order to show improved flexibility and posture.   - Pt to increase lumbar left sidebending ROM to at least 2 inches above patella in order to show improved mobility.  - Pt to be independent and consistent with issued HEP in order to promote carryover between therapy sessions.  - Pt will be able to perform and hold an ab brace for 10 reps of 10 second hold with normal breathing and no cuing in order to demo improved core recruitment.     Long Term Goals: 8 weeks     -Pt will report a decrease in LBP to 0/10 to increase tolerance for ADLs.  -Pt will increase hip strength by 1 ms grade in order to increase tolerance to ADLs and functional activities.  -Pt will report at CK level (< or = to 45% impaired) on FOTO score for low back pain disability to demonstrate decrease in disability and improvement in back pain.  - Pt to be Independent with updated HEP in order to maintain gains following discharge from PT.     Plan   Continue with established Plan of Care towards PT goals.  Ashley Holstein, PT 3/14/2017

## 2017-03-17 ENCOUNTER — LAB VISIT (OUTPATIENT)
Dept: LAB | Facility: HOSPITAL | Age: 82
End: 2017-03-17
Attending: INTERNAL MEDICINE
Payer: MEDICARE

## 2017-03-17 ENCOUNTER — CLINICAL SUPPORT (OUTPATIENT)
Dept: REHABILITATION | Facility: HOSPITAL | Age: 82
End: 2017-03-17
Attending: ORTHOPAEDIC SURGERY
Payer: MEDICARE

## 2017-03-17 DIAGNOSIS — E78.00 PURE HYPERCHOLESTEROLEMIA: ICD-10-CM

## 2017-03-17 DIAGNOSIS — M25.552 LEFT HIP PAIN: ICD-10-CM

## 2017-03-17 LAB
ALT SERPL W/O P-5'-P-CCNC: 17 U/L
ANION GAP SERPL CALC-SCNC: 9 MMOL/L
AST SERPL-CCNC: 20 U/L
BUN SERPL-MCNC: 20 MG/DL
CALCIUM SERPL-MCNC: 9.8 MG/DL
CHLORIDE SERPL-SCNC: 105 MMOL/L
CHOLEST/HDLC SERPL: 3.8 {RATIO}
CO2 SERPL-SCNC: 27 MMOL/L
CREAT SERPL-MCNC: 0.9 MG/DL
EST. GFR  (AFRICAN AMERICAN): >60 ML/MIN/1.73 M^2
EST. GFR  (NON AFRICAN AMERICAN): 58.9 ML/MIN/1.73 M^2
GLUCOSE SERPL-MCNC: 94 MG/DL
HDL/CHOLESTEROL RATIO: 26.5 %
HDLC SERPL-MCNC: 181 MG/DL
HDLC SERPL-MCNC: 48 MG/DL
LDLC SERPL CALC-MCNC: 94.4 MG/DL
NONHDLC SERPL-MCNC: 133 MG/DL
POTASSIUM SERPL-SCNC: 4 MMOL/L
SODIUM SERPL-SCNC: 141 MMOL/L
TRIGL SERPL-MCNC: 193 MG/DL

## 2017-03-17 PROCEDURE — 97140 MANUAL THERAPY 1/> REGIONS: CPT | Mod: PO

## 2017-03-17 PROCEDURE — 80061 LIPID PANEL: CPT

## 2017-03-17 PROCEDURE — 80048 BASIC METABOLIC PNL TOTAL CA: CPT

## 2017-03-17 PROCEDURE — 84460 ALANINE AMINO (ALT) (SGPT): CPT

## 2017-03-17 PROCEDURE — 97110 THERAPEUTIC EXERCISES: CPT | Mod: PO

## 2017-03-17 PROCEDURE — 36415 COLL VENOUS BLD VENIPUNCTURE: CPT | Mod: PO

## 2017-03-17 PROCEDURE — 84450 TRANSFERASE (AST) (SGOT): CPT

## 2017-03-17 NOTE — PROGRESS NOTES
"                                                    Physical Therapy Progress Note     Name: Gerda Lai  Clinic Number: [id number]  Diagnosis:   Encounter Diagnosis   Name Primary?    Left hip pain      Physician: Cesar Segundo MD  Treatment Orders: PT Eval and Treat  Past Medical History:   Diagnosis Date    Arthritis     Basal cell carcinoma 09/2016    right post auricular neck     Breast cancer     Cataract     Fibromyalgia     Hyperlipidemia     Hypertension     Personal history of colonic polyps     SCC (squamous cell carcinoma) 2015    R chest    SCC (squamous cell carcinoma) 2016    left medial shoulder    Squamous cell carcinoma 2015    right forearm    Thyroid disease     Vaginitis        Precautions: standard  Evaluation Date: 3/1/2017  Visit # authorized: 5/ 20  Authorization period: 12/31/2017  Plan of care Expiration: 4/26/17    Time In: 1:05  Time Out: 2:00  Treatment time: 55  1 on 1 time: 30    Missed visits:  no show(),  Cancellations()    Subjective   Pt reports: minimal (L) lateral hip and (L) knee discomfort.  Pain Scale:  3-4/10 L hip and knee pain    Objective     Gerda received therapeutic exercises to develop strength, endurance, ROM, flexibility and core stabilization for 45 minutes including:   Hamstring 90-90 3 x 30s  Supine ITB stretch on (L) with belt 3 x 20 sec  pelvic tilt 10 x 5s  Pelvic tilt with march with 1# 2 x 10  Supine clamshells 3 x 10 GTB  Hip adduction with ball 30 x 5s  Prone hip ext: 2 x 10  Prone quad stretch: 3 x 30"  Sciatic nerve glide on (L) x 20  Bridging + isometric hip abd/ER with GTB 3 x 10 with 3 sec hold  Sidelying Clamshells with YTB 2 x 10  Added Shuttle leg press with 1.5 bands 2 x 10     Patient receives manual therapy for pain control and to improve flexibility x 10 minutes to include:     long leg distraction 5 x 15 sec (L)LE    stick massage to (L) ITB in sidelying x 2 minutes  Soft tissue massage (L) ITB, (L) lateral knee x 5 " minutes.    Written Home Exercises Provided: continue with current  Pt demo good understanding of the education provided. Gerda demonstrated good return demonstration of activities.   Pt has no cultural, educational or language barriers to learning      Assessment      Pt had good tolerance to tx session today with improvements in signs and symptoms noted following session. She wsa able to progress slightly with ex's/activities without increased symptoms.   During Tx, patient reports that she sleeps primarily on her (L) side--she was educated on alternative sleeping positions on her (R) side or on her back to reduce stress to her (L) side which she will trial.. Knee pain more bothersome than hip today but also diminished post Tx with good relief reported with manual techniques.  Discomfort level = 2-3/10 post Tx. Primary Therapist not available today for dry needling--will look to possibly add next visit..      Pt prognosis is Fair. Pt will continue to benefit from skilled outpatient physical therapy to address the deficits listed below in the problem list, provide pt/family education and to maximize pt's level of independence in the home and community environment.     Anticipated barriers to physical therapy: none    Pt's spiritual, cultural and educational needs considered and pt agreeable to plan of care and goals as stated below:     Short Term Goals: 4 weeks     - Pt to increase popliteal angle to within < or = 20 degrees of full ROM in order to show improved flexibility and posture.   - Pt to increase lumbar left sidebending ROM to at least 2 inches above patella in order to show improved mobility.  - Pt to be independent and consistent with issued HEP in order to promote carryover between therapy sessions.  - Pt will be able to perform and hold an ab brace for 10 reps of 10 second hold with normal breathing and no cuing in order to demo improved core recruitment.     Long Term Goals: 8 weeks     -Pt will  report a decrease in LBP to 0/10 to increase tolerance for ADLs.  -Pt will increase hip strength by 1 ms grade in order to increase tolerance to ADLs and functional activities.  -Pt will report at CK level (< or = to 45% impaired) on FOTO score for low back pain disability to demonstrate decrease in disability and improvement in back pain.  - Pt to be Independent with updated HEP in order to maintain gains following discharge from PT.     Plan   Continue with established Plan of Care towards PT goals.  Milton Santana, PTA 3/17/2017

## 2017-03-21 ENCOUNTER — CLINICAL SUPPORT (OUTPATIENT)
Dept: REHABILITATION | Facility: HOSPITAL | Age: 82
End: 2017-03-21
Attending: ORTHOPAEDIC SURGERY
Payer: MEDICARE

## 2017-03-21 DIAGNOSIS — M25.552 LEFT HIP PAIN: ICD-10-CM

## 2017-03-21 PROCEDURE — 97110 THERAPEUTIC EXERCISES: CPT | Mod: PO

## 2017-03-21 NOTE — PROGRESS NOTES
"                                                    Physical Therapy Progress Note     Name: Gerda Lai  Clinic Number: [id number]  Diagnosis:   Encounter Diagnosis   Name Primary?    Left hip pain      Physician: Cesar Segundo MD  Treatment Orders: PT Eval and Treat  Past Medical History:   Diagnosis Date    Arthritis     Basal cell carcinoma 09/2016    right post auricular neck     Breast cancer     Cataract     Fibromyalgia     Hyperlipidemia     Hypertension     Personal history of colonic polyps     SCC (squamous cell carcinoma) 2015    R chest    SCC (squamous cell carcinoma) 2016    left medial shoulder    Squamous cell carcinoma 2015    right forearm    Thyroid disease     Vaginitis        Precautions: standard  Evaluation Date: 3/1/2017  Visit # authorized: 6/ 20  Authorization period: 12/31/2017  Plan of care Expiration: 4/26/17    Time In: 1:15 pm  Time Out: 2:00 pm  Treatment time: 45 minutes  1 on 1 time: 30    Missed visits:  no show(),  Cancellations()    Subjective     Pt reports: Pt reports she has been in a lot of pain over the past few days. Today, it is not as bad as she was before.  Pain Scale:  5/10 L hip and knee pain    Objective     Gerda received therapeutic exercises to develop strength, endurance, ROM, flexibility and core stabilization for 35 minutes including:   Hamstring 90-90 3 x 30s  Supine TFL stretch 3 x 20 sec  LTR 10 x10s  Pelvic tilt 10 x 5s  Pelvic tilt with march with 1# 2 x 10  Hip adduction with ball 30 x 5s  Prone hip ext: 2 x 10  Prone quad stretch: 3 x 30"  Sciatic nerve glide on (L) x 20  Bridging + isometric hip abd/ER with GTB 3 x 10 with 3 sec hold  Sidelying Clamshells with GTB 2 x 10  Added Shuttle leg press with 1.5 bands 2 x 10 (NP out of time)  Lateral steps with YTB 4 laps (next visit)     Patient receives manual therapy for pain control and to improve flexibility x 5 minutes to include:     long leg distraction 5 x 15 sec (L)LE _NP   " stick massage to (L) ITB in sidelying x 2 minutes _NP  Soft tissue massage (L) ITB, (L) lateral knee x 5 minutes.    Patient provided written and verbal consent to receive functional dry needling at today's visit (see consent form scanned into chart). FDN performed to pt's L vastus lateralis and along IT band. FDN performed to reduce pain and muscle tension, promote blood flow, and improve ROM and function x 5 minutes (no charge). Pt tolerated tx well without adverse effects. She was educated on what to expect following the procedure and she verbalized understanding.      Written Home Exercises Provided: continue with current  Pt demo good understanding of the education provided. Gerda demonstrated good return demonstration of activities.   Pt has no cultural, educational or language barriers to learning      Assessment      Pt responded well to dry needling at today's session reporting improvements in tightness and pain in her L leg immediately following. Pt tolerated all therex well without increased pain or symptoms. Pt demos good recall of activities performed at previous session. Will progress per pt tolerance.    Pt prognosis is Fair. Pt will continue to benefit from skilled outpatient physical therapy to address the deficits listed below in the problem list, provide pt/family education and to maximize pt's level of independence in the home and community environment.     Anticipated barriers to physical therapy: none    Pt's spiritual, cultural and educational needs considered and pt agreeable to plan of care and goals as stated below:     Short Term Goals: 4 weeks     - Pt to increase popliteal angle to within < or = 20 degrees of full ROM in order to show improved flexibility and posture.   - Pt to increase lumbar left sidebending ROM to at least 2 inches above patella in order to show improved mobility.  - Pt to be independent and consistent with issued HEP in order to promote carryover between therapy  sessions.  - Pt will be able to perform and hold an ab brace for 10 reps of 10 second hold with normal breathing and no cuing in order to demo improved core recruitment.     Long Term Goals: 8 weeks     -Pt will report a decrease in LBP to 0/10 to increase tolerance for ADLs.  -Pt will increase hip strength by 1 ms grade in order to increase tolerance to ADLs and functional activities.  -Pt will report at CK level (< or = to 45% impaired) on FOTO score for low back pain disability to demonstrate decrease in disability and improvement in back pain.  - Pt to be Independent with updated HEP in order to maintain gains following discharge from PT.     Plan   Continue with established Plan of Care towards PT goals.  Ashley Holstein, PT 3/21/2017

## 2017-03-23 ENCOUNTER — OFFICE VISIT (OUTPATIENT)
Dept: CARDIOLOGY | Facility: CLINIC | Age: 82
End: 2017-03-23
Payer: MEDICARE

## 2017-03-23 VITALS
HEART RATE: 64 BPM | WEIGHT: 141.63 LBS | SYSTOLIC BLOOD PRESSURE: 136 MMHG | HEIGHT: 64 IN | DIASTOLIC BLOOD PRESSURE: 60 MMHG | BODY MASS INDEX: 24.18 KG/M2

## 2017-03-23 DIAGNOSIS — E78.00 PURE HYPERCHOLESTEROLEMIA: ICD-10-CM

## 2017-03-23 DIAGNOSIS — I10 HYPERTENSION, ESSENTIAL: Primary | ICD-10-CM

## 2017-03-23 PROCEDURE — 99213 OFFICE O/P EST LOW 20 MIN: CPT | Mod: S$PBB,,, | Performed by: INTERNAL MEDICINE

## 2017-03-23 PROCEDURE — 99213 OFFICE O/P EST LOW 20 MIN: CPT | Mod: PBBFAC,PO | Performed by: INTERNAL MEDICINE

## 2017-03-23 PROCEDURE — 93005 ELECTROCARDIOGRAM TRACING: CPT | Mod: PBBFAC,PO | Performed by: INTERNAL MEDICINE

## 2017-03-23 PROCEDURE — 99999 PR PBB SHADOW E&M-EST. PATIENT-LVL III: CPT | Mod: PBBFAC,,, | Performed by: INTERNAL MEDICINE

## 2017-03-23 PROCEDURE — 93010 ELECTROCARDIOGRAM REPORT: CPT | Mod: ,,, | Performed by: INTERNAL MEDICINE

## 2017-03-23 RX ORDER — DEXTROMETHORPHAN HYDROBROMIDE, GUAIFENESIN 5; 100 MG/5ML; MG/5ML
650 LIQUID ORAL
Status: ON HOLD | COMMUNITY
End: 2020-08-10

## 2017-03-23 NOTE — PROGRESS NOTES
Subjective:   Patient ID:  Gerda Lai is a 84 y.o. female who presents for follow-up of Hypertension      Problem List:  Hypertension  - intolerance to multiple antihypertensive meds  Breast cancer    HPI:   Gerda Lai feels generally well. SHe had difficult to control hypertension and was intolerant to multiple antihypertensive meds. Her BP has been well controlled on the current regimen except for 1 day when she forget to take her medication at night.  Seen by Dr. Chisholm recently because a bookcase fell on her head. Noncontrast CT scan of the head was normal.   Creatinine has been 0.9-1.1 mg/dl.   She has arthritis in her knees. Her knees are swollen. She does not report ankle edema.       Review of Systems   Constitution: Negative for malaise/fatigue, weight gain and weight loss.   Cardiovascular: Negative for chest pain, dyspnea on exertion, leg swelling and palpitations.   Respiratory: Positive for sputum production. Negative for cough and wheezing.    Hematologic/Lymphatic: Does not bruise/bleed easily.   Musculoskeletal: Positive for arthritis, muscle cramps and muscle weakness.   Gastrointestinal: Negative for abdominal pain and melena.   Genitourinary: Positive for nocturia. Negative for frequency.   Psychiatric/Behavioral: Negative for depression. The patient is not nervous/anxious.        Current Outpatient Prescriptions   Medication Sig    acetaminophen (TYLENOL) 650 MG TbSR Take 650 mg by mouth as needed.    aliskiren (TEKTURNA) 300 MG Tab Take 1 tablet (300 mg total) by mouth once daily.    ascorbic acid (VITAMIN C) 100 MG tablet Take 100 mg by mouth once daily.    B INFANTIS/B ANI/B JSOE/B BIFID (PROBIOTIC 4X ORAL) Take 1 tablet by mouth daily    chlorthalidone (HYGROTEN) 25 MG Tab Take 25 mg by mouth once daily.    coenzyme Q10 (CO Q-10) 100 mg capsule Take 100 mg by mouth once daily.    fluticasone (FLONASE) 50 mcg/actuation nasal spray USE 1 SPRAY IN EACH NOSTRIL ONCE DAILY  "(Patient taking differently: as needed. )    glucosamine-chondroitin 500-400 mg tablet Take 1 tablet by mouth 2 (two) times daily.     labetalol (NORMODYNE) 100 MG tablet Take 100 mg by mouth every 12 (twelve) hours. Take one in am and one in the pm)    lorazepam (ATIVAN) 0.5 MG tablet Take one half tablet twice daily    multivitamin capsule Take 1 capsule by mouth once daily.    omeprazole (PRILOSEC OTC) 20 MG tablet Take 20 mg by mouth once daily.      pravastatin (PRAVACHOL) 40 MG tablet Take 1 tablet (40 mg total) by mouth once daily.    UNABLE TO FIND once daily. OTC EYE DROP; Systane         Social History   Substance Use Topics    Smoking status: Never Smoker    Smokeless tobacco: Never Used    Alcohol use 0.0 oz/week     2 - 3 Glasses of wine per week      Comment: socially         Objective:     Physical Exam   Constitutional: She is oriented to person, place, and time. She appears well-developed and well-nourished.   /60  Pulse 64  Ht 5' 4" (1.626 m)  Wt 64.3 kg (141 lb 10.3 oz)  BMI 24.31 kg/m2     Neck: No JVD present. Carotid bruit is not present.   Cardiovascular: Normal rate and regular rhythm.    No murmur heard.  Pulses:       Radial pulses are 2+ on the right side, and 2+ on the left side.        Posterior tibial pulses are 2+ on the right side   Pulmonary/Chest: She has no decreased breath sounds. She has no wheezes. She has no rales. Chest wall is not dull to percussion.   Abdominal: Soft. Normal appearance. There is no splenomegaly or hepatomegaly. There is no tenderness.   Musculoskeletal:        Right lower leg: She exhibits no edema.        Left lower leg: She exhibits no edema.   Neurological: She is alert and oriented to person, place, and time.   Skin: Skin is warm. No bruising noted. Nails show no clubbing.   Psychiatric: Her speech is normal and behavior is normal. Cognition and memory are normal.           Lab Results   Component Value Date    CHOL 181 03/17/2017    " HDL 48 03/17/2017    LDLCALC 94.4 03/17/2017    TRIG 193 (H) 03/17/2017    CHOLHDL 26.5 03/17/2017     Lab Results   Component Value Date    GLU 94 03/17/2017    CREATININE 0.9 03/17/2017    BUN 20 03/17/2017     03/17/2017    K 4.0 03/17/2017     03/17/2017    CO2 27 03/17/2017     Lab Results   Component Value Date    ALT 17 03/17/2017    AST 20 03/17/2017    ALKPHOS 60 04/01/2016    BILITOT 1.3 (H) 04/01/2016       ECG today reviewed by me. It reveals sinus rhythm with no ST or T abnormality.       Assessment:     1. Hypertension, essential    2. Pure hypercholesterolemia          Plan:     Continue current meds.  Extra 100 mg of labetalol prn for elevated BP.   Low sodium diet.  Cholesterol education.  RTC in 1 yr

## 2017-03-23 NOTE — PATIENT INSTRUCTIONS
Avoid salt.      Sugar, fat and cholesterol in food:    A sensible diet that limits the intake of sugars, saturated (bad) fats and trans fats while increasing the intake of unsaturated (good)  fats and plant proteins is the basis of the current dietary recommendations.      We now recommend drastically reducing the intake of sugar. There is less emphasis on excluding fat. And cholesterol in our food is no longer a significant concern. However please do not confuse this with the role of cholesterol in our blood and arteries. It is still cholesterol that clogs up our arteries whether it comes from our food or is manufactured by our bodies.       Most foods that are high in cholesterol are also high in saturated fat. But there is way more saturated fat than cholesterol in these foods. On the other hand there are a handful of foods that are high in cholesterol but do not contain much saturated fat: eggs, shrimp, crab legs and crawfish; these are OK to eat.       Saturated fat is the bad fat - you should limit your intake of this. Deep fried foods, meats and other animal fats are high in saturated fat. Cookies, donuts and most dessert and cakes are usually high in both saturated fat and sugar.       Unsaturated fat is the good fat. It contains the same number of calories as saturated fat but does not get deposited in our arteries. The Mediterranean style diet encourages the intake of unsaturated fat - olive oil, avocado and unsalted nuts.      You should eat a few servings of vegetables (and fruit as long as you are not diabetic) everyday. Substitute some plant proteins in place of meat: soy, beans, lentils, quinoa and oatmeal.     Do not use stick butter or stick margarine. Butter that comes in a tub is soft butter. It consists of 1/2 butter and 1/2 canola or another type of vegetable oil. It is fine to use that.       Trans fats should definitely be avoided. Most foods that are labelled as containing 0 gms of trans fat  can still contain several hundred milligrams of trans fat: creamer, margarine, refrigerator dough, deep fried foods, ready made frosting, potato, corn and torilla chips, cakes, cookies, pie crusts and crackers containing shortening made with hydrogenated vegetable oil.

## 2017-03-24 ENCOUNTER — CLINICAL SUPPORT (OUTPATIENT)
Dept: REHABILITATION | Facility: HOSPITAL | Age: 82
End: 2017-03-24
Attending: ORTHOPAEDIC SURGERY
Payer: MEDICARE

## 2017-03-24 DIAGNOSIS — M25.552 LEFT HIP PAIN: ICD-10-CM

## 2017-03-24 PROCEDURE — 97110 THERAPEUTIC EXERCISES: CPT | Mod: PO

## 2017-03-24 PROCEDURE — 97140 MANUAL THERAPY 1/> REGIONS: CPT | Mod: PO

## 2017-03-24 NOTE — PROGRESS NOTES
"                                                    Physical Therapy Progress Note     Name: Gerda Lai  Clinic Number: [id number]  Diagnosis:   Encounter Diagnosis   Name Primary?    Left hip pain      Physician: Cesar Segundo MD  Treatment Orders: PT Eval and Treat  Past Medical History:   Diagnosis Date    Arthritis     Basal cell carcinoma 09/2016    right post auricular neck     Breast cancer     Cataract     Fibromyalgia     Hyperlipidemia     Hypertension     Personal history of colonic polyps     SCC (squamous cell carcinoma) 2015    R chest    SCC (squamous cell carcinoma) 2016    left medial shoulder    Squamous cell carcinoma 2015    right forearm    Thyroid disease     Vaginitis        Precautions: standard  Evaluation Date: 3/1/2017  Visit # authorized: 7/ 20  Authorization period: 12/31/2017  Plan of care Expiration: 4/26/17    Time In: 1:05 pm  Time Out: 2:00 pm  Treatment time: 55 minutes       Missed visits:  no show(),  Cancellations()    Subjective     Pt reports: that initial dry needling Tx appeared helpful for 1-2 days with decreased pain. " Somedays I feel like my whole body is in knots."   Pain Scale:  2/10 L lateral hip and knee pain    Objective     Gerda received therapeutic exercises to develop strength, endurance, ROM, flexibility and core stabilization for 45 minutes including:   Hamstring 90-90 3 x 30s  Supine TFL stretch 3 x 20 sec  LTR 10 x10s  Pelvic tilt 10 x 5s  Pelvic tilt with march with 1#  2 x 10  Hip adduction with ball 10 x 5s ( Limited to 10 reps today due to adductor cramping)   Prone hip ext: 2 x 10   Prone quad stretch: 3 x 30"  Sciatic nerve glide on (L) x 20  Bridging + isometric hip abd/ER with GTB 3 x 10 with 3 sec hold  Sidelying Clamshells with GTB 2 x 10   Shuttle leg press with 1.5 bands 3 x 10    Lateral band walk  with YTB 2 x 15 feet       Patient receives manual therapy for pain control and to improve flexibility x 10 minutes to " include:     long leg distraction 5 x 15 sec (L)LE    stick massage to (L) ITB in sidelying x 2 minutes   Soft tissue massage (L) ITB, (L) lateral knee x 5 minutes.    Patient provided written and verbal consent to receive functional dry needling at today's visit (see consent form scanned into chart). FDN performed to pt's L vastus lateralis and along IT band. FDN performed to reduce pain and muscle tension, promote blood flow, and improve ROM and function x 5 minutes (no charge). Pt tolerated tx well without adverse effects. She was educated on what to expect following the procedure and she verbalized understanding.  ( Not performed today)    Written Home Exercises Provided: continue with current  Pt demo good understanding of the education provided. Gerda demonstrated good return demonstration of activities.   Pt has no cultural, educational or language barriers to learning      Assessment      Patient ulisses Tx fairly well. She did experience some adductor cramping with hooklying adductor set today and generalized muscular fatigue with (B) with ex's.  Dry needling Tx not performed today as clinician not available at time of Tx but will look to resume next Tx. . Discomfort level remained slight 1-2/10   Post TX. Will progress per pt tolerance.    Pt prognosis is Fair. Pt will continue to benefit from skilled outpatient physical therapy to address the deficits listed below in the problem list, provide pt/family education and to maximize pt's level of independence in the home and community environment.     Anticipated barriers to physical therapy: none    Pt's spiritual, cultural and educational needs considered and pt agreeable to plan of care and goals as stated below:     Short Term Goals: 4 weeks     - Pt to increase popliteal angle to within < or = 20 degrees of full ROM in order to show improved flexibility and posture.   - Pt to increase lumbar left sidebending ROM to at least 2 inches above patella in order to  show improved mobility.  - Pt to be independent and consistent with issued HEP in order to promote carryover between therapy sessions.  - Pt will be able to perform and hold an ab brace for 10 reps of 10 second hold with normal breathing and no cuing in order to demo improved core recruitment.     Long Term Goals: 8 weeks     -Pt will report a decrease in LBP to 0/10 to increase tolerance for ADLs.  -Pt will increase hip strength by 1 ms grade in order to increase tolerance to ADLs and functional activities.  -Pt will report at CK level (< or = to 45% impaired) on FOTO score for low back pain disability to demonstrate decrease in disability and improvement in back pain.  - Pt to be Independent with updated HEP in order to maintain gains following discharge from PT.     Plan   Continue with established Plan of Care towards PT goals.  Milton Santana, PTA 3/24/2017

## 2017-03-28 ENCOUNTER — CLINICAL SUPPORT (OUTPATIENT)
Dept: REHABILITATION | Facility: HOSPITAL | Age: 82
End: 2017-03-28
Attending: ORTHOPAEDIC SURGERY
Payer: MEDICARE

## 2017-03-28 DIAGNOSIS — M25.552 LEFT HIP PAIN: ICD-10-CM

## 2017-03-28 PROCEDURE — G8982 BODY POS GOAL STATUS: HCPCS | Mod: CK,PO

## 2017-03-28 PROCEDURE — 97140 MANUAL THERAPY 1/> REGIONS: CPT | Mod: PO

## 2017-03-28 PROCEDURE — 97110 THERAPEUTIC EXERCISES: CPT | Mod: PO

## 2017-03-28 PROCEDURE — G8981 BODY POS CURRENT STATUS: HCPCS | Mod: CJ,PO

## 2017-03-28 NOTE — PROGRESS NOTES
"                                                    Physical Therapy Progress Note     Name: Gerda Lai  Clinic Number: [id number]  Diagnosis:   Encounter Diagnosis   Name Primary?    Left hip pain      Physician: Cesar Segundo MD  Treatment Orders: PT Eval and Treat  Past Medical History:   Diagnosis Date    Arthritis     Basal cell carcinoma 09/2016    right post auricular neck     Breast cancer     Cataract     Fibromyalgia     Hyperlipidemia     Hypertension     Personal history of colonic polyps     SCC (squamous cell carcinoma) 2015    R chest    SCC (squamous cell carcinoma) 2016    left medial shoulder    Squamous cell carcinoma 2015    right forearm    Thyroid disease     Vaginitis        Precautions: standard  Evaluation Date: 3/1/2017  Visit # authorized: 8/ 20 (FOTO 3/28/17)  Authorization period: 12/31/2017  Plan of care Expiration: 4/26/17    G code 1 of 10    Time In: 810 am  Time Out: 0910 pm  Treatment time: 60 minutes 1:1 45 minutes       Missed visits:  no show(),  Cancellations()    Subjective     Pt reports: Pt reports her hip is feeling pretty good prior to therapy today. " I think the needling really helped"  Pain Scale:  1/10 L lateral hip and knee pain    Objective       Gerda received therapeutic exercises to develop strength, endurance, ROM, flexibility and core stabilization for 45 minutes including:   Hamstring 90-90 3 x 30s  Supine TFL stretch 3 x 20 sec  Gluteus medius stretch 3 x 30s  LTR 10 x10s  Pelvic tilt 10 x 5s  Pelvic tilt with march with 1#  2 x 10  Hip adduction with ball 10 x 5s ( Limited to 10 reps today due to adductor cramping) -NP out of time  Prone hip ext: 2 x 10   Prone quad stretch: 3 x 30"  Sciatic nerve glide on (L) x 20  Bridging + isometric hip abd/ER with GTB 3 x 10 with 3 sec hold  Sidelying Clamshells with GTB 2 x 10  Shuttle leg press with 1.5 bands 3 x 10  - NP out of time  Lateral band walk  with YTB 2 x 15 feet  - NP out of time     " Patient receives manual therapy for pain control and to improve flexibility x 10 minutes to include:   long leg distraction 5 x 15 sec (L)LE   stick massage to (L) ITB in sidelying x 2 minutes - NP  Soft tissue massage (L) ITB, (L) lateral knee x 5 minutes.    Patient provided written and verbal consent to receive functional dry needling at today's visit (see consent form scanned into chart). FDN performed to pt's L vastus lateralis, gluteus medius, and along IT band. FDN performed to reduce pain and muscle tension, promote blood flow, and improve ROM and function x 5 minutes (no charge). Pt tolerated tx well without adverse effects. She was educated on what to expect following the procedure and she verbalized understanding.    CMS Impairment/Limitation/Restriction for FOTO Hip Survey  Status Limitation G-Code CMS Severity Modifier  Intake 48% 52%  Predicted 55% 45% Goal Status+ CK - At least 40 percent but less than 60 percent  3/28/2017 48% 52% Current Status CK - At least 40 percent but less than 60 percent    Written Home Exercises Provided: continue with current  Pt demo good understanding of the education provided. Gerda demonstrated good return demonstration of activities.   Pt has no cultural, educational or language barriers to learning      Assessment      Needling to L IT band and glut medius performed today due to positive response from previous session. Overall, pt reporting improved mobility and decreased pain on a daily basis. Patient reporting hip fatigue following therex today. Will continue to progress per pt tolerance.    Pt prognosis is Fair. Pt will continue to benefit from skilled outpatient physical therapy to address the deficits listed below in the problem list, provide pt/family education and to maximize pt's level of independence in the home and community environment.     Anticipated barriers to physical therapy: none    Pt's spiritual, cultural and educational needs considered and pt  agreeable to plan of care and goals as stated below:     Short Term Goals: 4 weeks     - Pt to increase popliteal angle to within < or = 20 degrees of full ROM in order to show improved flexibility and posture.   - Pt to increase lumbar left sidebending ROM to at least 2 inches above patella in order to show improved mobility.  - Pt to be independent and consistent with issued HEP in order to promote carryover between therapy sessions.  - Pt will be able to perform and hold an ab brace for 10 reps of 10 second hold with normal breathing and no cuing in order to demo improved core recruitment.     Long Term Goals: 8 weeks     -Pt will report a decrease in LBP to 0/10 to increase tolerance for ADLs.  -Pt will increase hip strength by 1 ms grade in order to increase tolerance to ADLs and functional activities.  -Pt will report at CK level (< or = to 45% impaired) on FOTO score for low back pain disability to demonstrate decrease in disability and improvement in back pain.  - Pt to be Independent with updated HEP in order to maintain gains following discharge from PT.     Plan   Continue with established Plan of Care towards PT goals.  Ashley Holstein, PT 3/28/2017

## 2017-03-31 ENCOUNTER — CLINICAL SUPPORT (OUTPATIENT)
Dept: REHABILITATION | Facility: HOSPITAL | Age: 82
End: 2017-03-31
Attending: ORTHOPAEDIC SURGERY
Payer: MEDICARE

## 2017-03-31 DIAGNOSIS — M25.552 LEFT HIP PAIN: ICD-10-CM

## 2017-03-31 PROCEDURE — 97110 THERAPEUTIC EXERCISES: CPT | Mod: PO

## 2017-03-31 PROCEDURE — 97140 MANUAL THERAPY 1/> REGIONS: CPT | Mod: PO

## 2017-03-31 NOTE — PROGRESS NOTES
"                                                    Physical Therapy Progress Note     Name: Gerda Lai  Clinic Number: [id number]  Diagnosis:   Encounter Diagnosis   Name Primary?    Left hip pain      Physician: Cesar Segundo MD  Treatment Orders: PT Eval and Treat  Past Medical History:   Diagnosis Date    Arthritis     Basal cell carcinoma 09/2016    right post auricular neck     Breast cancer     Cataract     Fibromyalgia     Hyperlipidemia     Hypertension     Personal history of colonic polyps     SCC (squamous cell carcinoma) 2015    R chest    SCC (squamous cell carcinoma) 2016    left medial shoulder    Squamous cell carcinoma 2015    right forearm    Thyroid disease     Vaginitis        Precautions: standard  Evaluation Date: 3/1/2017  Visit # authorized: 8/ 20 (FOTO 3/28/17)  Authorization period: 12/31/2017  Plan of care Expiration: 4/26/17    G code 2 of 10    Time In: 1:05 am  Time Out: 2;20 pm  Treatment time: 55 minutes      Missed visits:  no show(),  Cancellations()    Subjective     Pt reports: Pt reports some increased (L) lateral hip/leg soreness after last dry needling Tx. Felt better after application of cryotherapy and also reports receiving a massage yesterday.   Pain Scale:  2/10 L lateral hip and knee pain    Objective     Gerda received therapeutic exercises to develop strength, endurance, ROM, flexibility and core stabilization for 45 minutes including:   Hamstring 90-90 3 x 30s  Supine TFL stretch 3 x 20 sec  Gluteus medius stretch 3 x 30s  LTR 10 x10s  Pelvic tilt 10 x 5s--NP  Pelvic tilt with march with 1#  2 x 10  Hip adduction with ball 10 x 5s   -NP    Prone hip ext: 2 x 10 --NP  Prone quad stretch: 3 x 30"  Sciatic nerve glide on (L) x 20  Bridging + isometric hip abd/ER with GTB 3 x 10 with 3 sec hold  Sidelying Clamshells with GTB 2 x 10  Added Bridging with legs supported on T-ball 2 x 10    Shuttle leg press with 2 bands + isometric hip abd/er GTB 2 x " 10      Lateral band walk  with OTB 2 x 15 feet         Patient receives manual therapy for pain control and to improve flexibility x 10 minutes to include:   long leg distraction 5 x 20  sec (L)LE   stick massage to (L) ITB/piriformis in sidelying x 2 minutes  each   Soft tissue massage (L) ITB, (L) lateral knee x 5 minutes.    Patient provided written and verbal consent to receive functional dry needling at today's visit ( Performed by Primary therapist Leigh) (see consent form scanned into chart). FDN performed to pt's L vastus lateralis, gluteus medius, and along IT band. FDN performed to reduce pain and muscle tension, promote blood flow, and improve ROM and function x 5 minutes (no charge). Pt tolerated tx well without adverse effects. She was educated on what to expect following the procedure and she verbalized understanding.  Patient receives moist heat applied to (L) lateral hip and leg to promote healing/blood flow to affected area.     CMS Impairment/Limitation/Restriction for FOTO Hip Survey  Status Limitation G-Code CMS Severity Modifier  Intake 48% 52%  Predicted 55% 45% Goal Status+ CK - At least 40 percent but less than 60 percent  3/28/2017 48% 52% Current Status CK - At least 40 percent but less than 60 percent    Written Home Exercises Provided: continue with current  Pt demo good understanding of the education provided. Gerda demonstrated good return demonstration of activities.   Pt has no cultural, educational or language barriers to learning      Assessment       Patient ulisses [place] fairly well. She was able to perform and progress slightly with ex's/activities without increased symptoms. Patient with subjective reports of improved flexibility and pain control post Tx ( Pain level = 1/10) .  Patient reported some soreness from previous needling session but would like to continue because she feels it is helpful overall.    Will continue to progress per pt tolerance.    Pt prognosis is Fair. Pt  will continue to benefit from skilled outpatient physical therapy to address the deficits listed below in the problem list, provide pt/family education and to maximize pt's level of independence in the home and community environment.     Anticipated barriers to physical therapy: none    Pt's spiritual, cultural and educational needs considered and pt agreeable to plan of care and goals as stated below:     Short Term Goals: 4 weeks     - Pt to increase popliteal angle to within < or = 20 degrees of full ROM in order to show improved flexibility and posture.   - Pt to increase lumbar left sidebending ROM to at least 2 inches above patella in order to show improved mobility.  - Pt to be independent and consistent with issued HEP in order to promote carryover between therapy sessions.  - Pt will be able to perform and hold an ab brace for 10 reps of 10 second hold with normal breathing and no cuing in order to demo improved core recruitment.     Long Term Goals: 8 weeks     -Pt will report a decrease in LBP to 0/10 to increase tolerance for ADLs.  -Pt will increase hip strength by 1 ms grade in order to increase tolerance to ADLs and functional activities.  -Pt will report at CK level (< or = to 45% impaired) on FOTO score for low back pain disability to demonstrate decrease in disability and improvement in back pain.  - Pt to be Independent with updated HEP in order to maintain gains following discharge from PT.     Plan   Continue with established Plan of Care towards PT goals.  Milton Santana, PTA 3/31/2017

## 2017-04-05 ENCOUNTER — CLINICAL SUPPORT (OUTPATIENT)
Dept: REHABILITATION | Facility: HOSPITAL | Age: 82
End: 2017-04-05
Attending: ORTHOPAEDIC SURGERY
Payer: MEDICARE

## 2017-04-05 DIAGNOSIS — M25.552 LEFT HIP PAIN: ICD-10-CM

## 2017-04-05 PROCEDURE — 97110 THERAPEUTIC EXERCISES: CPT | Mod: PO

## 2017-04-05 NOTE — PROGRESS NOTES
"                                                    Physical Therapy Progress Note     Name: Gerda Lai  Clinic Number: [id number]  Diagnosis:   Encounter Diagnosis   Name Primary?    Left hip pain      Physician: Cesar Segundo MD  Treatment Orders: PT Eval and Treat  Past Medical History:   Diagnosis Date    Arthritis     Basal cell carcinoma 09/2016    right post auricular neck     Breast cancer     Cataract     Fibromyalgia     Hyperlipidemia     Hypertension     Personal history of colonic polyps     SCC (squamous cell carcinoma) 2015    R chest    SCC (squamous cell carcinoma) 2016    left medial shoulder    Squamous cell carcinoma 2015    right forearm    Thyroid disease     Vaginitis        Precautions: standard  Evaluation Date: 3/1/2017  Visit # authorized: 9/ 20 (FOTO 3/28/17)  Authorization period: 12/31/2017  Plan of care Expiration: 4/26/17    G code3 of 10    Time In: 11:05 am  Time Out: 12:05 pm  Treatment time: 55 minutes     1on1 time: 30    Missed visits:  no show(),  Cancellations()    Subjective     Pt reports:some continued (L) hip and knee pain. She also c/o multiple arthritic pain to shoulders as well.   Pain Scale:  3-4/10 L lateral hip and knee pain.    Objective     Gerda received therapeutic exercises to develop strength, endurance, ROM, flexibility and core stabilization for 45 minutes including:   Hamstring 90-90 3 x 30s  Supine TFL stretch 3 x 20 sec  Gluteus medius stretch 3 x 30s  LTR 10 x10s--NP  Pelvic tilt 10 x 5s--NP  Pelvic tilt with march with 1#  2 x 10  Hip adduction with ball 10 x 5s   -NP    Prone hip ext: 2 x 10 --NP  Prone quad stretch: 3 x 30"  Sciatic nerve glide on (L) x 20  Bridging + isometric hip abd/ER with GTB 3 x 10 with 3 sec hold  Sidelying Clamshells with GTB 2 x 10    Bridging with legs supported on T-ball 2 x 10    Shuttle leg press with 2 bands + isometric hip abd/er GTB 3 x 10      Lateral band walk  with OTB 2 x 15 feet --NP        " Patient receives manual therapy for pain control and to improve flexibility x 10 minutes to include:   long leg distraction 5 x 20  sec (L)LE   stick massage to (L) ITB/piriformis in sidelying x 2 minutes  each   Soft tissue massage (L) ITB, (L) lateral knee x 5 minutes.    Patient provided written and verbal consent to receive functional dry needling at today's visit ( Performed by Primary therapist Leigh) (see consent form scanned into chart). FDN performed to pt's L vastus lateralis, gluteus medius, and along IT band. FDN performed to reduce pain and muscle tension, promote blood flow, and improve ROM and function x 5 minutes (no charge). Pt tolerated tx well without adverse effects. She was educated on what to expect following the procedure and she verbalized understanding.  Patient receives moist heat applied to (L) lateral hip and leg to promote healing/blood flow to affected area.     CMS Impairment/Limitation/Restriction for FOTO Hip Survey  Status Limitation G-Code CMS Severity Modifier  Intake 48% 52%  Predicted 55% 45% Goal Status+ CK - At least 40 percent but less than 60 percent  3/28/2017 48% 52% Current Status CK - At least 40 percent but less than 60 percent    Written Home Exercises Provided: continue with current  Pt demo good understanding of the education provided. Gerda demonstrated good return demonstration of activities.   Pt has no cultural, educational or language barriers to learning      Assessment       Patient ulisses [place] fairly well. She remains challenged with current ex's and still with some lingering discomfort therefore, ex's were not progressed on this day. . Patient with subjective reports of improved flexibility and pain control post Tx ( Pain level = 2/10) .  . Patient noting benefits with dry needling    Will continue to progress per pt tolerance.    Pt prognosis is Fair. Pt will continue to benefit from skilled outpatient physical therapy to address the deficits listed below in  the problem list, provide pt/family education and to maximize pt's level of independence in the home and community environment.     Anticipated barriers to physical therapy: none    Pt's spiritual, cultural and educational needs considered and pt agreeable to plan of care and goals as stated below:     Short Term Goals: 4 weeks     - Pt to increase popliteal angle to within < or = 20 degrees of full ROM in order to show improved flexibility and posture.   - Pt to increase lumbar left sidebending ROM to at least 2 inches above patella in order to show improved mobility.  - Pt to be independent and consistent with issued HEP in order to promote carryover between therapy sessions.  - Pt will be able to perform and hold an ab brace for 10 reps of 10 second hold with normal breathing and no cuing in order to demo improved core recruitment.     Long Term Goals: 8 weeks     -Pt will report a decrease in LBP to 0/10 to increase tolerance for ADLs.  -Pt will increase hip strength by 1 ms grade in order to increase tolerance to ADLs and functional activities.  -Pt will report at CK level (< or = to 45% impaired) on FOTO score for low back pain disability to demonstrate decrease in disability and improvement in back pain.  - Pt to be Independent with updated HEP in order to maintain gains following discharge from PT.     Plan   Continue with established Plan of Care towards PT goals.  Milton Santana, PTA 4/5/2017

## 2017-04-06 DIAGNOSIS — E78.5 HYPERLIPIDEMIA: ICD-10-CM

## 2017-04-06 RX ORDER — PRAVASTATIN SODIUM 40 MG/1
TABLET ORAL
Qty: 90 TABLET | Refills: 3 | Status: SHIPPED | OUTPATIENT
Start: 2017-04-06 | End: 2018-01-27 | Stop reason: SDUPTHER

## 2017-04-07 ENCOUNTER — CLINICAL SUPPORT (OUTPATIENT)
Dept: REHABILITATION | Facility: HOSPITAL | Age: 82
End: 2017-04-07
Attending: ORTHOPAEDIC SURGERY
Payer: MEDICARE

## 2017-04-07 DIAGNOSIS — M25.552 LEFT HIP PAIN: ICD-10-CM

## 2017-04-07 PROCEDURE — 97140 MANUAL THERAPY 1/> REGIONS: CPT | Mod: PO

## 2017-04-07 PROCEDURE — 97110 THERAPEUTIC EXERCISES: CPT | Mod: PO

## 2017-04-07 NOTE — PROGRESS NOTES
"                                                    Physical Therapy Progress Note     Name: Gerda Lai  Clinic Number: [id number]  Diagnosis:   Encounter Diagnosis   Name Primary?    Left hip pain      Physician: Cesar Segundo MD  Treatment Orders: PT Eval and Treat  Past Medical History:   Diagnosis Date    Arthritis     Basal cell carcinoma 09/2016    right post auricular neck     Breast cancer     Cataract     Fibromyalgia     Hyperlipidemia     Hypertension     Personal history of colonic polyps     SCC (squamous cell carcinoma) 2015    R chest    SCC (squamous cell carcinoma) 2016    left medial shoulder    Squamous cell carcinoma 2015    right forearm    Thyroid disease     Vaginitis        Precautions: standard  Evaluation Date: 3/1/2017  Visit # authorized: 10/ 20 (FOTO 3/28/17)  Authorization period: 12/31/2017  Plan of care Expiration: 4/26/17    G code 4 of 10    Time In: 1:10  ( Patient with late arrival)   Time Out: 2:10    Treatment time: 55 minutes      Missed visits:  no show(),  Cancellations()    Subjective     Pt reports (L) hip pain has diminished . She reports aggravating pain to (L) knee which moves around. She continues to feel that the dry needling Tx's are helpful  Pain Scale:  2-/10 L lateral hip and knee pain. ( knee moreso than hip today)     Objective     Gerda received therapeutic exercises to develop strength, endurance, ROM, flexibility and core stabilization for 45 minutes including:   Hamstring 90-90 3 x 30s  Supine TFL stretch 3 x 20 sec  Gluteus medius stretch 3 x 30s  LTR 10 x10s--NP  Pelvic tilt 10 x 5s  Pelvic tilt with march with 1#  X 20  Hip adduction with ball 30 x 5s      Prone hip ext: 2 x 10 --NP  Prone quad stretch: 3 x 30"   Sciatic nerve glide on (L) x 20  Bridging + isometric hip abd/ER with GTB 3 x 10 with 3 sec hold  Sidelying Clamshells with GTB 2 x 10    Bridging with legs supported on T-ball 2 x 10    Shuttle leg press with 2 bands + " isometric hip abd/er GTB 3 x 10      Lateral band walk  with OTB 2 x 15 feet  --NP  Added Glute medius kick backs 2 x 10 (B)     Patient receives manual therapy for pain control and to improve flexibility x 10 minutes to include:   long leg distraction 5 x 20  sec (L)LE   stick massage to (L) ITB/piriformis in sidelying x 2 minutes  each   Soft tissue massage (L) ITB, (L) lateral knee x 5 minutes.    Patient provided written and verbal consent to receive functional dry needling at today's visit ( Performed by Primary therapist Leigh) (see consent form scanned into chart). FDN performed to pt's L vastus lateralis, gluteus medius, and along IT band. FDN performed to reduce pain and muscle tension, promote blood flow, and improve ROM and function x 5 minutes (no charge). Pt tolerated tx well without adverse effects. She was educated on what to expect following the procedure and she verbalized understanding.  Patient receives moist heat applied to (L) lateral hip and leg to promote healing/blood flow to affected area.     CMS Impairment/Limitation/Restriction for FOTO Hip Survey  Status Limitation G-Code CMS Severity Modifier  Intake 48% 52%  Predicted 55% 45% Goal Status+ CK - At least 40 percent but less than 60 percent  3/28/2017 48% 52% Current Status CK - At least 40 percent but less than 60 percent    Written Home Exercises Provided: continue with current  Pt demo good understanding of the education provided. Gerda demonstrated good return demonstration of activities.   Pt has no cultural, educational or language barriers to learning      Assessment       Patient ulisses [place] fairly well. She  Was able to perform and progress slightly with ex's/activities. Some muscular fatigue but no increase in pain reported.  . Patient with subjective reports of improved flexibility and pain control post Tx ( Pain level = 1-2/10) .  Symptoms more centered around (L) lateral knee  vs hip today. Patient continues to note benefits  with dry needling    Will continue to progress per pt tolerance.    Pt prognosis is Fair. Pt will continue to benefit from skilled outpatient physical therapy to address the deficits listed below in the problem list, provide pt/family education and to maximize pt's level of independence in the home and community environment.     Anticipated barriers to physical therapy: none    Pt's spiritual, cultural and educational needs considered and pt agreeable to plan of care and goals as stated below:     Short Term Goals: 4 weeks     - Pt to increase popliteal angle to within < or = 20 degrees of full ROM in order to show improved flexibility and posture.   - Pt to increase lumbar left sidebending ROM to at least 2 inches above patella in order to show improved mobility.  - Pt to be independent and consistent with issued HEP in order to promote carryover between therapy sessions.  - Pt will be able to perform and hold an ab brace for 10 reps of 10 second hold with normal breathing and no cuing in order to demo improved core recruitment.     Long Term Goals: 8 weeks     -Pt will report a decrease in LBP to 0/10 to increase tolerance for ADLs.  -Pt will increase hip strength by 1 ms grade in order to increase tolerance to ADLs and functional activities.  -Pt will report at CK level (< or = to 45% impaired) on FOTO score for low back pain disability to demonstrate decrease in disability and improvement in back pain.  - Pt to be Independent with updated HEP in order to maintain gains following discharge from PT.     Plan   Continue with established Plan of Care towards PT goals.  Milton Santana, PTA 4/7/2017

## 2017-04-10 ENCOUNTER — CLINICAL SUPPORT (OUTPATIENT)
Dept: REHABILITATION | Facility: HOSPITAL | Age: 82
End: 2017-04-10
Attending: ORTHOPAEDIC SURGERY
Payer: MEDICARE

## 2017-04-10 DIAGNOSIS — M25.552 LEFT HIP PAIN: ICD-10-CM

## 2017-04-10 DIAGNOSIS — I10 ESSENTIAL HYPERTENSION: ICD-10-CM

## 2017-04-10 PROCEDURE — 97140 MANUAL THERAPY 1/> REGIONS: CPT | Mod: PO

## 2017-04-10 PROCEDURE — 97110 THERAPEUTIC EXERCISES: CPT | Mod: PO

## 2017-04-10 RX ORDER — ALISKIREN HEMIFUMARATE 300 MG/1
TABLET, FILM COATED ORAL
Qty: 30 TABLET | Refills: 10 | Status: SHIPPED | OUTPATIENT
Start: 2017-04-10 | End: 2017-08-27 | Stop reason: DRUGHIGH

## 2017-04-10 NOTE — PROGRESS NOTES
"                                                    Physical Therapy Progress Note     Name: Gerda Lai  Clinic Number: [id number]  Diagnosis:   Encounter Diagnosis   Name Primary?    Left hip pain      Physician: Cesar Segundo MD  Treatment Orders: PT Eval and Treat  Past Medical History:   Diagnosis Date    Arthritis     Basal cell carcinoma 09/2016    right post auricular neck     Breast cancer     Cataract     Fibromyalgia     Hyperlipidemia     Hypertension     Personal history of colonic polyps     SCC (squamous cell carcinoma) 2015    R chest    SCC (squamous cell carcinoma) 2016    left medial shoulder    Squamous cell carcinoma 2015    right forearm    Thyroid disease     Vaginitis        Precautions: standard  Evaluation Date: 3/1/2017  Visit # authorized: 11/ 20 (FOTO 3/28/17)  Authorization period: 12/31/2017  Plan of care Expiration: 4/26/17    G code 5 of 10    Time In: 11;10  ( Patient with late arrival)   Time Out: 12:10    Treatment time: 55 minutes    1 on1 time: 30     Missed visits:  no show(),  Cancellations()    Subjective     Pt reports (L) hip pain has diminished . She reports aggravating pain to (L) knee which moves around. She continues to feel that the dry needling Tx's are helpful  Pain Scale:  2-/10 L lateral hip and knee pain. ( knee moreso than hip today)     Objective     Gerda received therapeutic exercises to develop strength, endurance, ROM, flexibility and core stabilization for 45 minutes including:   Hamstring 90-90 3 x 30s  Supine TFL stretch 3 x 20 sec  Gluteus medius stretch 3 x 30s  LTR 10 x10s--NP  Pelvic tilt 10 x 5s  Pelvic tilt with march with 1.5#  X 20  Hip adduction with ball 30 x 5s      Prone hip ext: 2 x 10 --NP  Prone quad stretch: 3 x 30"   Sciatic nerve glide on (L) x 20  Bridging + isometric hip abd/ER with GTB 3 x 10 with 3 sec hold  Sidelying Clamshells with GTB 2 x 10    Bridging with legs supported on T-ball 2 x 10    Shuttle leg " press with 2 bands + isometric hip abd/er GTB 3 x 10      Lateral band walk  with OTB 2 x 15 feet  --NP  Glute medius kick backs 2 x 10 (B)     Patient receives manual therapy for pain control and to improve flexibility x 10 minutes to include:   long leg distraction 5 x 20  sec (L)LE   stick massage to (L) ITB/piriformis in sidelying x 2 minutes  each   Soft tissue massage (L) ITB, (L) lateral knee x 5 minutes.    Patient provided written and verbal consent to receive functional dry needling at today's visit ( Performed by Primary therapist Leigh) (see consent form scanned into chart). FDN performed to pt's L vastus lateralis, gluteus medius, and along IT band. FDN performed to reduce pain and muscle tension, promote blood flow, and improve ROM and function x 5 minutes (no charge). Pt tolerated tx well without adverse effects. She was educated on what to expect following the procedure and she verbalized understanding.  Patient receives moist heat applied to (L) lateral hip and leg to promote healing/blood flow to affected area.     CMS Impairment/Limitation/Restriction for FOTO Hip Survey  Status Limitation G-Code CMS Severity Modifier  Intake 48% 52%  Predicted 55% 45% Goal Status+ CK - At least 40 percent but less than 60 percent  3/28/2017 48% 52% Current Status CK - At least 40 percent but less than 60 percent    Written Home Exercises Provided: continue with current  Pt demo good understanding of the education provided. Gerda demonstrated good return demonstration of activities.   Pt has no cultural, educational or language barriers to learning      Assessment       Patient ulisses [place] fairly well. She  was able to perform and progress slightly with ex's/activities. Some muscular fatigue with ex's but no increase in pain reported.  . Patient with subjective reports of improved flexibility and pain control post Tx ( Pain level = 1-2/10) .  Symptoms more centered around (L) lateral knee moreso than hip .Patient  continues to note benefits with dry needling    Will continue to progress per pt tolerance.    Pt prognosis is Fair. Pt will continue to benefit from skilled outpatient physical therapy to address the deficits listed below in the problem list, provide pt/family education and to maximize pt's level of independence in the home and community environment.     Anticipated barriers to physical therapy: none    Pt's spiritual, cultural and educational needs considered and pt agreeable to plan of care and goals as stated below:     Short Term Goals: 4 weeks     - Pt to increase popliteal angle to within < or = 20 degrees of full ROM in order to show improved flexibility and posture.   - Pt to increase lumbar left sidebending ROM to at least 2 inches above patella in order to show improved mobility.  - Pt to be independent and consistent with issued HEP in order to promote carryover between therapy sessions.  - Pt will be able to perform and hold an ab brace for 10 reps of 10 second hold with normal breathing and no cuing in order to demo improved core recruitment.     Long Term Goals: 8 weeks     -Pt will report a decrease in LBP to 0/10 to increase tolerance for ADLs.  -Pt will increase hip strength by 1 ms grade in order to increase tolerance to ADLs and functional activities.  -Pt will report at CK level (< or = to 45% impaired) on FOTO score for low back pain disability to demonstrate decrease in disability and improvement in back pain.  - Pt to be Independent with updated HEP in order to maintain gains following discharge from PT.     Plan   Continue with established Plan of Care towards PT goals.  Milton Santana, PTA 4/10/2017

## 2017-04-12 ENCOUNTER — CLINICAL SUPPORT (OUTPATIENT)
Dept: REHABILITATION | Facility: HOSPITAL | Age: 82
End: 2017-04-12
Attending: ORTHOPAEDIC SURGERY
Payer: MEDICARE

## 2017-04-12 DIAGNOSIS — M25.552 LEFT HIP PAIN: ICD-10-CM

## 2017-04-12 PROCEDURE — 97140 MANUAL THERAPY 1/> REGIONS: CPT | Mod: PO

## 2017-04-12 PROCEDURE — 97110 THERAPEUTIC EXERCISES: CPT | Mod: PO

## 2017-04-12 NOTE — PROGRESS NOTES
"                                                    Physical Therapy Progress Note     Name: Gerda Lai  Clinic Number: [id number]  Diagnosis:   Encounter Diagnosis   Name Primary?    Left hip pain      Physician: Cesar Segundo MD  Treatment Orders: PT Eval and Treat  Past Medical History:   Diagnosis Date    Arthritis     Basal cell carcinoma 09/2016    right post auricular neck     Breast cancer     Cataract     Fibromyalgia     Hyperlipidemia     Hypertension     Personal history of colonic polyps     SCC (squamous cell carcinoma) 2015    R chest    SCC (squamous cell carcinoma) 2016    left medial shoulder    Squamous cell carcinoma 2015    right forearm    Thyroid disease     Vaginitis        Precautions: standard  Evaluation Date: 3/1/2017  Visit # authorized: 12/ 20 (FOTO 3/28/17)  Authorization period: 12/31/2017  Plan of care Expiration: 4/26/17    G code 6 of 10    Time In: 0210 pm   Time Out: 0305 pm  Treatment time: 55 minutes    1 on1 time: 30     Missed visits:  no show(),  Cancellations()    Subjective     Pt reports she walking better yesterday. She states her pain came back a little but it is not too bad today.  Pain Scale:  1/10 L lateral hip and knee pain. ( knee more so than hip today)     Objective     Gerda received therapeutic exercises to develop strength, endurance, ROM, flexibility and core stabilization for 40 minutes including:   Hamstring 90-90 3 x 30s  Supine TFL stretch 3 x 20 sec  Gluteus medius stretch 3 x 30s  Pelvic tilt with march with 2#  X 20 B  Hip adduction with ball 30 x 5s      Prone quad stretch: 3 x 30"   Bridging + isometric hip abd/ER with GTB 3 x 10 with 3 sec hold  Sidelying Clamshells with GTB 2 x 10  Sciatic nerve glide on (L) x 20  Bridging with legs supported on T-ball 2 x 10    Shuttle leg press with 2 bands + isometric hip abd/er GTB 3 x 10      Lateral band walk  with OTB 2 x 15 feet    Glute medius kick backs 2 x 10 (B) 1#    To add at " future sessions: lat step downs, hip extension at shuttle or plyobox, squats with theraband for abduction, BOSU side steps    Patient receives manual therapy for pain control and to improve flexibility x 10 minutes to include:   long leg distraction 5 x 20  sec (L)LE   stick massage to (L) ITB/piriformis in sidelying x 2 minutes   Soft tissue massage (L) ITB, (L) lateral knee x 5 minutes.    Patient provided written and verbal consent to receive functional dry needling at today's visit ( Performed by Primary therapist Leigh) (see consent form scanned into chart). FDN performed to pt's L vastus lateralis, gluteus medius, and along IT band. FDN performed to reduce pain and muscle tension, promote blood flow, and improve ROM and function x 5 minutes (no charge). Pt tolerated tx well without adverse effects. She was educated on what to expect following the procedure and she verbalized understanding.  Patient receives moist heat applied to (L) lateral hip and leg to promote healing/blood flow to affected area.     Written Home Exercises Provided: continue with current  Pt demo good understanding of the education provided. Gerda demonstrated good return demonstration of activities.   Pt has no cultural, educational or language barriers to learning      Assessment      Patient continuing to respond well to physical therapy treatment at this time. She is demonstrating improved technique and only occasional cues for form while performing mat activities. Pt should be ready to advance to increased standing functional activities at next session if pain continues to be reduced overall. Pt prognosis is Fair. Pt will continue to benefit from skilled outpatient physical therapy to address the deficits listed below in the problem list, provide pt/family education and to maximize pt's level of independence in the home and community environment.     Anticipated barriers to physical therapy: none    Pt's spiritual, cultural and  educational needs considered and pt agreeable to plan of care and goals as stated below:     Short Term Goals: 4 weeks     - Pt to increase popliteal angle to within < or = 20 degrees of full ROM in order to show improved flexibility and posture.   - Pt to increase lumbar left sidebending ROM to at least 2 inches above patella in order to show improved mobility.  - Pt to be independent and consistent with issued HEP in order to promote carryover between therapy sessions.  - Pt will be able to perform and hold an ab brace for 10 reps of 10 second hold with normal breathing and no cuing in order to demo improved core recruitment.     Long Term Goals: 8 weeks     -Pt will report a decrease in LBP to 0/10 to increase tolerance for ADLs.  -Pt will increase hip strength by 1 ms grade in order to increase tolerance to ADLs and functional activities.  -Pt will report at CK level (< or = to 45% impaired) on FOTO score for low back pain disability to demonstrate decrease in disability and improvement in back pain.  - Pt to be Independent with updated HEP in order to maintain gains following discharge from PT.     Plan   Continue with established Plan of Care towards PT goals.  Ashley Holstein, PT 4/12/2017

## 2017-04-18 ENCOUNTER — CLINICAL SUPPORT (OUTPATIENT)
Dept: REHABILITATION | Facility: HOSPITAL | Age: 82
End: 2017-04-18
Attending: ORTHOPAEDIC SURGERY
Payer: MEDICARE

## 2017-04-18 DIAGNOSIS — M25.552 LEFT HIP PAIN: ICD-10-CM

## 2017-04-18 PROCEDURE — 97140 MANUAL THERAPY 1/> REGIONS: CPT | Mod: PO

## 2017-04-18 PROCEDURE — 97110 THERAPEUTIC EXERCISES: CPT | Mod: PO

## 2017-04-18 NOTE — PROGRESS NOTES
"                                                    Physical Therapy Progress Note     Name: Gerda Lai  Clinic Number: [id number]  Diagnosis:   Encounter Diagnosis   Name Primary?    Left hip pain      Physician: Cesar Segundo MD  Treatment Orders: PT Eval and Treat  Past Medical History:   Diagnosis Date    Arthritis     Basal cell carcinoma 09/2016    right post auricular neck     Breast cancer     Cataract     Fibromyalgia     Hyperlipidemia     Hypertension     Personal history of colonic polyps     SCC (squamous cell carcinoma) 2015    R chest    SCC (squamous cell carcinoma) 2016    left medial shoulder    Squamous cell carcinoma 2015    right forearm    Thyroid disease     Vaginitis        Precautions: standard  Evaluation Date: 3/1/2017  Visit # authorized: 13/ 20 (FOTO 3/28/17)  Authorization period: 12/31/2017  Plan of care Expiration: 4/26/17    G code 6 of 10    Time In: 0140 pm   Time Out: 0235 pm  Treatment time: 55 minutes    1 on1 time: 42 minutes    Missed visits:  no show(),  Cancellations()    Subjective     Pt reports she feels like her BP may be high prior to session. She states she ate food with salt over Easter, and that may have contributed to higher BP.  Pain Scale:  1/10 L lateral hip and knee pain. ( knee more so than hip today)     Objective       Pt reported she felt a "little off" prior to session. Pt left and went home to take her blood pressure medication. She returned and her BP was recorded at 172/78. Pt rested for 10 minutes, and then reported she felt much better. Supine exercises performed only. Post tx BP: 145/78    Gerda received therapeutic exercises to develop strength, endurance, ROM, flexibility and core stabilization for 40 minutes including:   Hamstring 90-90 3 x 30s  Supine TFL stretch 3 x 20 sec  Gluteus medius stretch 3 x 30s  Pelvic tilt with march with 2#  X 20 B  Hip adduction with pilates ring 20 x 5s      Hip flexor stretch off mat 3 x " 30s  Bridging + isometric hip abd/ER with GTB 3 x 10 with 3 sec hold  Sidelying Clamshells with GTB 2 x 10  Reverse clams 2 x 10  Sciatic nerve glide on (L) x 20  Bridging with legs supported on T-ball 2 x 10  No UE support    Not performed today:  Shuttle leg press with 2 bands + isometric hip abd/er GTB 3 x 10      Lateral band walk  with OTB 2 x 15 feet    Glute medius kick backs 2 x 10 (B) 1#    To add at future sessions: lat step downs, hip extension at shuttle or plyobox, squats with theraband for abduction, BOSU side steps    Patient receives manual therapy for pain control and to improve flexibility x 15 minutes to include:   long leg distraction 5 x 20  sec (L)LE   stick massage to (L) ITB/piriformis in sidelying x 2 minutes   Soft tissue massage (L) ITB, (L) lateral knee x 10 minutes.    Patient provided written and verbal consent to receive functional dry needling at today's visit ( Performed by Primary therapist Leigh) (see consent form scanned into chart). FDN performed to pt's L vastus lateralis, gluteus medius, and along IT band. FDN performed to reduce pain and muscle tension, promote blood flow, and improve ROM and function x 5 minutes (no charge). Pt tolerated tx well without adverse effects. She was educated on what to expect following the procedure and she verbalized understanding. NOT PERFORMED TODAY    Written Home Exercises Provided: continue with current  Pt demo good understanding of the education provided. Gerda demonstrated good return demonstration of activities.   Pt has no cultural, educational or language barriers to learning      Assessment      Exercise session modified today due to pt presenting with elevated BP. Pt had resolution of symptoms following taking her BP medication. Supine exercises only performed today due to elevated pressure recording. Pt responded well to tx session, and her blood pressure recording decreased towards normal baseline at end of session. Will resume  normal activities at next session. Pt prognosis is Fair. Pt will continue to benefit from skilled outpatient physical therapy to address the deficits listed below in the problem list, provide pt/family education and to maximize pt's level of independence in the home and community environment.     Anticipated barriers to physical therapy: none    Pt's spiritual, cultural and educational needs considered and pt agreeable to plan of care and goals as stated below:     Short Term Goals: 4 weeks     - Pt to increase popliteal angle to within < or = 20 degrees of full ROM in order to show improved flexibility and posture.   - Pt to increase lumbar left sidebending ROM to at least 2 inches above patella in order to show improved mobility.  - Pt to be independent and consistent with issued HEP in order to promote carryover between therapy sessions.  - Pt will be able to perform and hold an ab brace for 10 reps of 10 second hold with normal breathing and no cuing in order to demo improved core recruitment.     Long Term Goals: 8 weeks     -Pt will report a decrease in LBP to 0/10 to increase tolerance for ADLs.  -Pt will increase hip strength by 1 ms grade in order to increase tolerance to ADLs and functional activities.  -Pt will report at CK level (< or = to 45% impaired) on FOTO score for low back pain disability to demonstrate decrease in disability and improvement in back pain.  - Pt to be Independent with updated HEP in order to maintain gains following discharge from PT.     Plan   Continue with established Plan of Care towards PT goals.  Ashley Holstein, PT 4/18/2017

## 2017-04-21 ENCOUNTER — OFFICE VISIT (OUTPATIENT)
Dept: RHEUMATOLOGY | Facility: CLINIC | Age: 82
End: 2017-04-21
Payer: MEDICARE

## 2017-04-21 ENCOUNTER — CLINICAL SUPPORT (OUTPATIENT)
Dept: REHABILITATION | Facility: HOSPITAL | Age: 82
End: 2017-04-21
Attending: ORTHOPAEDIC SURGERY
Payer: MEDICARE

## 2017-04-21 VITALS
HEART RATE: 61 BPM | WEIGHT: 149.63 LBS | SYSTOLIC BLOOD PRESSURE: 147 MMHG | HEIGHT: 64 IN | BODY MASS INDEX: 25.54 KG/M2 | DIASTOLIC BLOOD PRESSURE: 81 MMHG

## 2017-04-21 DIAGNOSIS — M54.17 LUMBOSACRAL RADICULOPATHY AT L5: ICD-10-CM

## 2017-04-21 DIAGNOSIS — M17.0 PRIMARY OSTEOARTHRITIS OF BOTH KNEES: ICD-10-CM

## 2017-04-21 DIAGNOSIS — Z96.641 HISTORY OF TOTAL RIGHT HIP REPLACEMENT: ICD-10-CM

## 2017-04-21 DIAGNOSIS — M16.12 PRIMARY OSTEOARTHRITIS OF LEFT HIP: ICD-10-CM

## 2017-04-21 DIAGNOSIS — Z78.0 MENOPAUSE: Primary | ICD-10-CM

## 2017-04-21 DIAGNOSIS — M25.552 LEFT HIP PAIN: ICD-10-CM

## 2017-04-21 PROCEDURE — 97110 THERAPEUTIC EXERCISES: CPT | Mod: PO

## 2017-04-21 PROCEDURE — 99213 OFFICE O/P EST LOW 20 MIN: CPT | Mod: PBBFAC | Performed by: INTERNAL MEDICINE

## 2017-04-21 PROCEDURE — 97140 MANUAL THERAPY 1/> REGIONS: CPT | Mod: PO

## 2017-04-21 PROCEDURE — 99213 OFFICE O/P EST LOW 20 MIN: CPT | Mod: S$PBB,,, | Performed by: INTERNAL MEDICINE

## 2017-04-21 PROCEDURE — 99999 PR PBB SHADOW E&M-EST. PATIENT-LVL III: CPT | Mod: PBBFAC,,, | Performed by: INTERNAL MEDICINE

## 2017-04-21 ASSESSMENT — ROUTINE ASSESSMENT OF PATIENT INDEX DATA (RAPID3)
PAIN SCORE: 4
WHEN YOU AWAKENED IN THE MORNING OVER THE LAST WEEK, PLEASE INDICATE THE AMOUNT OF TIME IT TAKES UNTIL YOU ARE AS LIMBER AS YOU WILL BE FOR THE DAY: 10 MINS
PSYCHOLOGICAL DISTRESS SCORE: 3.3
PATIENT GLOBAL ASSESSMENT SCORE: 2
MDHAQ FUNCTION SCORE: .6
AM STIFFNESS SCORE: 1, YES
FATIGUE SCORE: 5
TOTAL RAPID3 SCORE: 2.67

## 2017-04-21 NOTE — PROGRESS NOTES
"Subjective:       Patient ID: Gerda Lai is a 84 y.o. female.    Chief Complaint:     HPI  84 yF with L greater trochanteric bursitis, L5 radiculopathy, and osteoarthritis, last seen on 1/11/17, here for f/u. At the last visit, she was given a left GTB injection and started on gabapentin 100 mg nightly.    Today, she continues to complain of generalized joint and muscle pain. She has been going to PT for the last two months and states that it has been helping her tremendously. No change in pain characterization. Radiating pain from the hip to the foot. Remarks that she was limping today. States steroid injection lasted about 1-2 weeks. Took gabapentin but had a bad reaction (doesn't remember).  Taking glucosamine-chondroitin sulfate twice daily. Takes tylenol 2-3x/day prn.    Also complains of some pain in her forearm (left) with activity, but this is intermittent and not bothering her currently.    2/23 Ortho prescribed PT for hip and knees    Review of Systems   Constitutional: Negative for fever and unexpected weight change.   HENT: Negative for mouth sores and trouble swallowing.    Eyes: Negative for redness.   Respiratory: Negative for cough and shortness of breath.    Cardiovascular: Negative for chest pain.   Gastrointestinal: Positive for constipation and diarrhea.   Genitourinary: Negative for dysuria and genital sores.   Skin: Negative for color change and rash.   Neurological: Negative for headaches.   Hematological: Does not bruise/bleed easily.   Psychiatric/Behavioral: Negative for agitation.         Objective:   BP (!) 147/81  Pulse 61  Ht 5' 3.6" (1.615 m)  Wt 67.9 kg (149 lb 9.6 oz)  BMI 26 kg/m2     Physical Exam   Constitutional: She is oriented to person, place, and time and well-developed, well-nourished, and in no distress.   HENT:   Head: Normocephalic and atraumatic.   Eyes: EOM are normal. Pupils are equal, round, and reactive to light.   Neck:   Non-tender to palpation in " paraspinals or midline   Cardiovascular: Normal rate and intact distal pulses.    Pulmonary/Chest: Effort normal.       Right Side Rheumatological Exam     Examination finds the shoulder, elbow and wrist normal.    The patient has an enlarged knee, 1st PIP, 1st MCP, 2nd PIP, 2nd MCP, 3rd PIP, 3rd MCP, 4th PIP, 4th MCP, 5th PIP, 5th MCP, 1st CMC, 2nd DIP, 3rd DIP, 4th DIP and 5th DIP    Shoulder Exam   Sensation: normal    Knee Exam   Sensation: normal    Hip Exam   Sensation: normal    Elbow/Wrist Exam   Sensation: normal    Muscle Strength (0-5 scale):  Deltoid:  5  Biceps: 5/5   Triceps:  5  : 5/5   Iliopsoas: 5  Quadriceps:  5   Distal Lower Extremity: 5    Left Side Rheumatological Exam     Examination finds the shoulder, elbow and wrist normal.    The patient is tender to palpation of the 1st CMC.    The patient has an enlarged knee, 1st PIP, 1st MCP, 2nd PIP, 2nd MCP, 3rd PIP, 3rd MCP, 4th PIP, 4th MCP, 5th PIP, 5th MCP, 1st CMC, 2nd DIP, 3rd DIP, 4th DIP and 5th DIP.    Shoulder Exam   Sensation: normal    Knee Exam   Sensation: normal    Hip Exam   Sensation: normal    Elbow/Wrist Exam   Sensation: normal    Muscle Strength (0-5 scale):  Deltoid:  5  Biceps: 5/5   Triceps:  5  :  5/5   Iliopsoas: 5  Quadriceps:  5   Distal Lower Extremity: 5      Neurological: She is alert and oriented to person, place, and time.   Reflex Scores:       Tricep reflexes are 2+ on the right side and 2+ on the left side.       Bicep reflexes are 2+ on the right side and 2+ on the left side.       Brachioradialis reflexes are 2+ on the right side and 2+ on the left side.       Patellar reflexes are 2+ on the right side and 2+ on the left side.       Achilles reflexes are 2+ on the right side and 2+ on the left side.  Skin: Skin is warm and dry.     Psychiatric: Affect normal.   Musculoskeletal: Normal range of motion.   TTP over left GTB      SLR negative bilaterally.     Results for JOSÉ MIGUEL CASTILLO (MRN [id number]) as of  4/21/2017 09:37   Ref. Range 3/17/2017 09:27   Sodium Latest Ref Range: 136 - 145 mmol/L 141   Potassium Latest Ref Range: 3.5 - 5.1 mmol/L 4.0   Chloride Latest Ref Range: 95 - 110 mmol/L 105   CO2 Latest Ref Range: 23 - 29 mmol/L 27   Anion Gap Latest Ref Range: 8 - 16 mmol/L 9   BUN, Bld Latest Ref Range: 8 - 23 mg/dL 20   Creatinine Latest Ref Range: 0.5 - 1.4 mg/dL 0.9   eGFR if non African American Latest Ref Range: >60 mL/min/1.73 m^2 58.9 (A)   eGFR if African American Latest Ref Range: >60 mL/min/1.73 m^2 >60.0   Glucose Latest Ref Range: 70 - 110 mg/dL 94   Calcium Latest Ref Range: 8.7 - 10.5 mg/dL 9.8   AST Latest Ref Range: 10 - 40 U/L 20   ALT Latest Ref Range: 10 - 44 U/L 17   Triglycerides Latest Ref Range: 30 - 150 mg/dL 193 (H)   Cholesterol Latest Ref Range: 120 - 199 mg/dL 181   HDL Latest Ref Range: 40 - 75 mg/dL 48   LDL Cholesterol Latest Ref Range: 63.0 - 159.0 mg/dL 94.4   Total Cholesterol/HDL Ratio Latest Ref Range: 2.0 - 5.0  3.8   HDL/Chol Ratio Latest Ref Range: 20.0 - 50.0 % 26.5   Non-HDL Cholesterol Latest Units: mg/dL 133     1/23/17 Lumbar X-ray  The lumbar x-rays show grade 1 slippage of L4 on L5 and backward slippage L1-2. Multilevel degenerative disc disease and osteoarthritis of the facet joints of the low back, all changes previously noted, no definite progression. RJQ    1/23/17 Hip X-ray  The right total hip prosthesis is well situated. There are chronic changes greater trochanters associated with chronic bursitis and tendinitis. Some osteoarthritis of left hip. RJQ  Assessment:       1. Osteoarthritis, unspecified osteoarthritis type, unspecified site    2. Paresthesia of left leg    3. Greater trochanteric bursitis of left hip    Left lumbar radiculopathy, L5 predominantly    Plan:       Continue PT and dry needling. Not interested in injection at today's visit.  Unable to tolerate gabapentin. Continue Tylenol prn for pain.  Continue glucosamine-chondroitin sulfate twice  daily.  RTC 3 months

## 2017-04-21 NOTE — PROGRESS NOTES
Physical Therapy Progress Note     Name: Gerda Lai  Clinic Number: [id number]  Diagnosis:   Encounter Diagnosis   Name Primary?    Left hip pain      Physician: Cesar Segundo MD  Treatment Orders: PT Eval and Treat  Past Medical History:   Diagnosis Date    Arthritis     Basal cell carcinoma 09/2016    right post auricular neck     Breast cancer     Cataract     Fibromyalgia     Hyperlipidemia     Hypertension     Personal history of colonic polyps     SCC (squamous cell carcinoma) 2015    R chest    SCC (squamous cell carcinoma) 2016    left medial shoulder    Squamous cell carcinoma 2015    right forearm    Thyroid disease     Vaginitis        Precautions: standard  Evaluation Date: 3/1/2017  Visit # authorized: 14/ 20 (FOTO 3/28/17)  Authorization period: 12/31/2017  Plan of care Expiration: 4/26/17    G code 7 of 10    Time In: 0105 pm   Time Out: 0200 pm  Treatment time: 55 minutes    1 on1 time: 55 minutes    Missed visits:  no show(),  Cancellations()    Subjective     Pt reports she was limping when walking this morning but she put biofreeze on it, and it is feeling better now.  Pain Scale:  4/10 L lateral hip and knee pain. (in L lateral thigh)    Objective     Gerda received therapeutic exercises to develop strength, endurance, ROM, flexibility and core stabilization for 40 minutes including:   Hamstring 90-90 3 x 30s  Supine TFL stretch 3 x 20 sec  Gluteus medius stretch 3 x 30s  TA pulldown YTB with hamstring curl on green theraball 2 x 10  Lateral band walk  with OTB 2 x 15 feet    Hip flexor stretch off mat 3 x 30s  Bridging + isometric hip abd/ER with GTB 3 x 10 with 3 sec hold  Sidelying Clamshells with GTB 2 x 10  Reverse clams 2 x 10  Sciatic nerve glide on (L) x 20  hip extension at shuttle or plyobox 2 x 10 L2  BOSU side steps x 15      To add at future sessions: lat step downs, squats with theraband for abduction,      Not performed today:  Shuttle leg press with 2 bands + isometric hip abd/er GTB 3 x 10      Glute medius kick backs 2 x 10 (B) 1#    Patient receives manual therapy for pain control and to improve flexibility x 15 minutes to include:   long leg distraction 5 x 20  sec (L)LE   Soft tissue massage (L) glutes,  (L) ITB, (L) lateral knee x 10 minutes.    Patient provided written and verbal consent to receive functional dry needling at today's visit. FDN performed to pt's L vastus lateralis, gluteus medius, and along IT band. FDN performed to reduce pain and muscle tension, promote blood flow, and improve ROM and function x 5 minutes (no charge). Pt tolerated tx well without adverse effects. She was educated on what to expect following the procedure and she verbalized understanding.     Written Home Exercises Provided: continue with current  Pt demo good understanding of the education provided. Gerda demonstrated good return demonstration of activities.   Pt has no cultural, educational or language barriers to learning      Assessment      Pt responded well to new activities added today reporting increased fatigue in hips at end of session. Increased MF restrictions noted in L hip ERs today which responded well to manual techniques. Pt prognosis is Fair. Pt will continue to benefit from skilled outpatient physical therapy to address the deficits listed below in the problem list, provide pt/family education and to maximize pt's level of independence in the home and community environment.     Anticipated barriers to physical therapy: none    Pt's spiritual, cultural and educational needs considered and pt agreeable to plan of care and goals as stated below:     Short Term Goals: 4 weeks     - Pt to increase popliteal angle to within < or = 20 degrees of full ROM in order to show improved flexibility and posture.   - Pt to increase lumbar left sidebending ROM to at least 2 inches above patella in order to show improved  mobility.  - Pt to be independent and consistent with issued HEP in order to promote carryover between therapy sessions.  - Pt will be able to perform and hold an ab brace for 10 reps of 10 second hold with normal breathing and no cuing in order to demo improved core recruitment.     Long Term Goals: 8 weeks     -Pt will report a decrease in LBP to 0/10 to increase tolerance for ADLs.  -Pt will increase hip strength by 1 ms grade in order to increase tolerance to ADLs and functional activities.  -Pt will report at CK level (< or = to 45% impaired) on FOTO score for low back pain disability to demonstrate decrease in disability and improvement in back pain.  - Pt to be Independent with updated HEP in order to maintain gains following discharge from PT.     Plan   Continue with established Plan of Care towards PT goals.  Ashley Holstein, PT 4/21/2017

## 2017-04-21 NOTE — PROGRESS NOTES
I have personally taken the history and examined the patient and agree with the resident,s note as stated above  The left hip injection last visit only helped several weeks. Saw Ortho subsequently who referred to PT twice weekly at [facility], which she continues and is doing much better, still pain radiating left leg, no neuro symptoms    Exam: SLR neg Back non-tender. Neuro with nl LE strength and reflexes.        The lumbar x-rays show grade 1 slippage of L4 on L5 and backward slippage L1-2. Multilevel degenerative disc disease and osteoarthritis of the facet joints of the low back, all changes previously noted, no definite progression. RJQ   External Result Report   External Result Report   Narrative   Technique: AP and lateral views of the lumbar spine    Comparison: 09/28/2015    Results:Is isn't changed prior. Continued straightening of the normal lumbar lordosis with grade 1 anterolisthesis of L4 on L5 and retrolisthesis of L1 on L2. Endplate degenerative disease most prominent at L1/L2 and L2/L3 levels. Allowing for degenerative changes the lumbar vertebral heights and contours are stable without evidence for acute fracture. Continued facet degenerative change at nearly all levels. Further evaluation as warrented clinically..   Impression    See Above .      Electronically signed by: FRAN AVILA DO  Date: 01/23/17  Time: 11:16     Encounter   View Encounter        The right total hip prosthesis is well situated. There are chronic changes greater trochanters associated with chronic bursitis and tendinitis. Some osteoarthritis of left hip. RJQ   External Result Report   External Result Report   Narrative   Technique: AP and frog-leg lateral view right hip    Comparison: 05/07/15    Results:Remote operative change from right total hip arthroplasty again identified no evidence for acute fracture or dislocation. Bony pelvis grossly intact. Scattered vascular calcium is right groin soft tissues   Impression    See  Above .      Electronically signed by: FRAN AVILA DO  Date: 01/23/17  Time: 11:14     Encounter   View Encounter      Reviewed By   Ramirez Garcia MD on 4/13/2017  3:10 PM   Cesar Burroughs MD on 1/23/2017 12:14 PM         Lumbar spondylosis  Left lumbar radiculopathy, L5 predominantly  Left trochanteric bursitis injected 1/11/17   OA left hip, s/p right ELZBIETA  OA right> left knee   rotator cuff tear arthropathy left    Complete PT  RTC 6  months

## 2017-04-24 RX ORDER — LORAZEPAM 0.5 MG/1
TABLET ORAL
Qty: 60 TABLET | Refills: 1 | Status: SHIPPED | OUTPATIENT
Start: 2017-04-24 | End: 2017-09-10 | Stop reason: SDUPTHER

## 2017-04-25 RX ORDER — LORAZEPAM 0.5 MG/1
TABLET ORAL
Qty: 60 TABLET | OUTPATIENT
Start: 2017-04-25

## 2017-04-25 NOTE — TELEPHONE ENCOUNTER
Rx for Ativan called in to pharmacy. Message left for patient informing her that Rx was called in to pharmacy and that she should contact them for .

## 2017-04-25 NOTE — TELEPHONE ENCOUNTER
----- Message from Jenny Boudreaux sent at 4/25/2017 11:30 AM CDT -----  Contact: call  pt [phone]  Pt is calling to check on status of lorazepam (ATIVAN) 0.5 MG tablet, she states that she called yesterday and was told medication request was at pharmacy and when she got there to  was told that they did not have medication

## 2017-04-28 ENCOUNTER — CLINICAL SUPPORT (OUTPATIENT)
Dept: REHABILITATION | Facility: HOSPITAL | Age: 82
End: 2017-04-28
Attending: ORTHOPAEDIC SURGERY
Payer: MEDICARE

## 2017-04-28 DIAGNOSIS — M25.552 LEFT HIP PAIN: ICD-10-CM

## 2017-04-28 PROCEDURE — 97110 THERAPEUTIC EXERCISES: CPT | Mod: PO

## 2017-04-28 PROCEDURE — 97140 MANUAL THERAPY 1/> REGIONS: CPT | Mod: PO

## 2017-04-28 NOTE — PROGRESS NOTES
Physical Therapy Progress Note     Name: Gerda Lai  Clinic Number: [id number]  Diagnosis:   Encounter Diagnosis   Name Primary?    Left hip pain      Physician: Cesar Segundo MD  Treatment Orders: PT Eval and Treat  Past Medical History:   Diagnosis Date    Arthritis     Basal cell carcinoma 09/2016    right post auricular neck     Breast cancer     Cataract     Fibromyalgia     Hyperlipidemia     Hypertension     Personal history of colonic polyps     SCC (squamous cell carcinoma) 2015    R chest    SCC (squamous cell carcinoma) 2016    left medial shoulder    Squamous cell carcinoma 2015    right forearm    Thyroid disease     Vaginitis        Precautions: standard  Evaluation Date: 3/1/2017  Visit # authorized: 15/ 20 (FOTO 3/28/17)  Authorization period: 12/31/2017  Plan of care Expiration: 4/26/17    G code 8 of 10    Time In: 0105 pm   Time Out: 0205 pm  Treatment time: 60 minutes    1 on1 time billable: 55 minutes    Missed visits:  no show(),  Cancellations()    Subjective     Pt reports today she is a little more certain on her feet. She states its not as bad as it has been over the past few days.  Pain Scale:  4/10 L lateral hip and knee pain. (in L lateral thigh)    Objective     Gerda received therapeutic exercises to develop strength, endurance, ROM, flexibility and core stabilization for 40 minutes including:   Hamstring 90-90 3 x 30s  Supine TFL stretch 3 x 20 sec  Gluteus medius stretch 3 x 30s  TA pulldown YTB with hamstring curl on green theraball 2 x 10  Lateral band walk  with OTB 2 x 15 feet    Hip flexor stretch off mat 3 x 30s  Bridging + isometric hip abd/ER with GTB 3 x 10 with 3 sec hold  Sidelying Clamshells with GTB 2 x 10  Reverse clams 2 x 10 1#  hip extension at shuttle or plyobox 2 x 10 L2  BOSU side steps x 15      To add at future sessions: lat step downs, squats with theraband for abduction    Not performed  today:  Shuttle leg press with 2 bands + isometric hip abd/er GTB 3 x 10      Glute medius kick backs 2 x 10 (B) 1#    Patient receives manual therapy for pain control and to improve flexibility x 15 minutes to include:   long leg distraction 5 x 20  sec (L)LE   Soft tissue massage (L) glutes,  (L) ITB, (L) lateral knee x 10 minutes.    Patient provided written and verbal consent to receive functional dry needling at today's visit. FDN performed to pt's L vastus lateralis, gluteus medius, and along IT band. FDN performed to reduce pain and muscle tension, promote blood flow, and improve ROM and function x 5 minutes (no charge). Pt tolerated tx well without adverse effects. She was educated on what to expect following the procedure and she verbalized understanding.     Written Home Exercises Provided: continue with current  Pt demo good understanding of the education provided. Gerda demonstrated good return demonstration of activities.   Pt has no cultural, educational or language barriers to learning      Assessment      Tolerated tx session well with increased muscle twitches noted along posterior border of IT band during dry needling. Pt performed all activities well without adverse effects. Pt educated on upcoming DC in 4 visits with an updated HEP in order to maintain therapy gains, and she was agreeable to plan. Pt reports she will be having an injection in her knee within the next two weeks, and she hopes it will help with her pain. Pt prognosis is Fair. Pt will continue to benefit from skilled outpatient physical therapy to address the deficits listed below in the problem list, provide pt/family education and to maximize pt's level of independence in the home and community environment.     Anticipated barriers to physical therapy: none    Pt's spiritual, cultural and educational needs considered and pt agreeable to plan of care and goals as stated below:     Short Term Goals: 4 weeks     - Pt to increase  popliteal angle to within < or = 20 degrees of full ROM in order to show improved flexibility and posture.   - Pt to increase lumbar left sidebending ROM to at least 2 inches above patella in order to show improved mobility.  - Pt to be independent and consistent with issued HEP in order to promote carryover between therapy sessions.  - Pt will be able to perform and hold an ab brace for 10 reps of 10 second hold with normal breathing and no cuing in order to demo improved core recruitment.     Long Term Goals: 8 weeks     -Pt will report a decrease in LBP to 0/10 to increase tolerance for ADLs.  -Pt will increase hip strength by 1 ms grade in order to increase tolerance to ADLs and functional activities.  -Pt will report at CK level (< or = to 45% impaired) on FOTO score for low back pain disability to demonstrate decrease in disability and improvement in back pain.  - Pt to be Independent with updated HEP in order to maintain gains following discharge from PT.     Plan   Continue with established Plan of Care towards PT goals.    Ashley Holstein, PT 4/28/2017

## 2017-05-01 ENCOUNTER — OFFICE VISIT (OUTPATIENT)
Dept: FAMILY MEDICINE | Facility: CLINIC | Age: 82
End: 2017-05-01
Payer: MEDICARE

## 2017-05-01 ENCOUNTER — TELEPHONE (OUTPATIENT)
Dept: FAMILY MEDICINE | Facility: CLINIC | Age: 82
End: 2017-05-01

## 2017-05-01 ENCOUNTER — TELEPHONE (OUTPATIENT)
Dept: CARDIOLOGY | Facility: CLINIC | Age: 82
End: 2017-05-01

## 2017-05-01 VITALS
DIASTOLIC BLOOD PRESSURE: 68 MMHG | TEMPERATURE: 98 F | WEIGHT: 143.75 LBS | HEIGHT: 64 IN | BODY MASS INDEX: 24.54 KG/M2 | SYSTOLIC BLOOD PRESSURE: 142 MMHG | HEART RATE: 68 BPM

## 2017-05-01 DIAGNOSIS — I10 ESSENTIAL HYPERTENSION: ICD-10-CM

## 2017-05-01 PROCEDURE — 99214 OFFICE O/P EST MOD 30 MIN: CPT | Mod: S$PBB,,, | Performed by: FAMILY MEDICINE

## 2017-05-01 PROCEDURE — 99999 PR PBB SHADOW E&M-EST. PATIENT-LVL III: CPT | Mod: PBBFAC,,, | Performed by: FAMILY MEDICINE

## 2017-05-01 PROCEDURE — 99213 OFFICE O/P EST LOW 20 MIN: CPT | Mod: PBBFAC,PO | Performed by: FAMILY MEDICINE

## 2017-05-01 RX ORDER — LABETALOL 100 MG/1
100 TABLET, FILM COATED ORAL EVERY 12 HOURS
Start: 2017-05-01 | End: 2017-05-01 | Stop reason: SDUPTHER

## 2017-05-01 RX ORDER — LABETALOL 100 MG/1
TABLET, FILM COATED ORAL
Qty: 90 TABLET | Refills: 3
Start: 2017-05-01 | End: 2017-10-14 | Stop reason: SDUPTHER

## 2017-05-01 NOTE — MR AVS SNAPSHOT
[facility]  [address] [facility], [address]  [facility] [address]  Phone: [phone]  Fax: [phone]                  Gerda Lai   2017 11:00 AM   Office Visit    Description:  Female : 1933   Provider:  Demario Sellers MD   Department:  [facility]           Reason for Visit     Blood Pressure Check     Headache           Diagnoses this Visit        Comments    Essential hypertension                To Do List           Future Appointments        Provider Department Dept Phone    2017 3:00 PM Ashley Holstein, [place] [facility] [phone]    2017 1:00 PM Ashley Holstein, PT [facility] [phone]    2017 3:00 PM Milton Santana PTA [facility] [phone]    2017 2:00 PM Ashley Holstein, [place] [facility] [phone]      Goals (5 Years of Data)     None      Follow-Up and Disposition     Return in about 2 weeks (around 5/15/2017).       These Medications        Disp Refills Start End    labetalol (NORMODYNE) 100 MG tablet 90 tablet 3 2017     Take one and one half tablets twice daily    Pharmacy: [facility]/pharmacy #[id number]  [address] Ph #: [phone]         [facility]     [facility] Care [facility] -  Assistance  Unless otherwise directed by your provider, please contact [facility], our nurse care line that is available for  assistance.     Registered nurses in the [facility] provide: appointment scheduling, clinical advisement, health education, and other advisory services.  Call: [phone] (toll free)               Medications           Message regarding Medications     Verify the changes and/or additions to your medication regime listed below are the same as discussed with your clinician today.  If any of these changes or additions are incorrect, please notify your healthcare provider.    "     CHANGE how you are taking these medications     Start Taking Instead of    labetalol (NORMODYNE) 100 MG tablet labetalol (NORMODYNE) 100 MG tablet    Dosage:  Take one and one half tablets twice daily Dosage:  Take 1 tablet (100 mg total) by mouth every 12 (twelve) hours. Take one in am and two in the pm    Reason for Change:  Reorder            Verify that the below list of medications is an accurate representation of the medications you are currently taking.  If none reported, the list may be blank. If incorrect, please contact your healthcare provider. Carry this list with you in case of emergency.           Current Medications     acetaminophen (TYLENOL) 650 MG TbSR Take 650 mg by mouth as needed.    ascorbic acid (VITAMIN C) 100 MG tablet Take 100 mg by mouth once daily.    B INFANTIS/B ANI/B JOSE/B BIFID (PROBIOTIC 4X ORAL) Take 1 tablet by mouth daily    chlorthalidone (HYGROTEN) 25 MG Tab Take 25 mg by mouth once daily.    coenzyme Q10 (CO Q-10) 100 mg capsule Take 100 mg by mouth once daily.    fluticasone (FLONASE) 50 mcg/actuation nasal spray USE 1 SPRAY IN EACH NOSTRIL ONCE DAILY    glucosamine-chondroitin 500-400 mg tablet Take 1 tablet by mouth 2 (two) times daily.     labetalol (NORMODYNE) 100 MG tablet Take one and one half tablets twice daily    lorazepam (ATIVAN) 0.5 MG tablet TAKE ONE HALF TABLET 2 TIMES A DAY    multivitamin capsule Take 1 capsule by mouth once daily.    omeprazole (PRILOSEC OTC) 20 MG tablet Take 20 mg by mouth once daily.      pravastatin (PRAVACHOL) 40 MG tablet TAKE 1 TABLET (40 MG TOTAL) BY MOUTH ONCE DAILY.    TEKTURNA 300 mg Tab TAKE 1 TABLET (300 MG TOTAL) BY MOUTH ONCE DAILY.    UNABLE TO FIND once daily. OTC EYE DROP; Systane           Clinical Reference Information           Your Vitals Were     BP Pulse Temp Height Weight BMI    142/68 (BP Location: Left arm, Patient Position: Sitting, BP Method: Manual) 68 97.5 °F (36.4 °C) (Oral) 5' 4" (1.626 m) 65.2 kg (143 lb " 11.8 oz) 24.67 kg/m2      Blood Pressure          Most Recent Value    BP  (!)  142/68      Allergies as of 5/1/2017     Voltaren [Diclofenac Sodium]    Clarithromycin    Losartan    Flexeril [Cyclobenzaprine]    Iodine And Iodide Containing Products    Lisinopril    Norvasc [Amlodipine]    Tramadol    Metoprolol    Sulfa (Sulfonamide Antibiotics)    Verapamil (Bulk)      Immunizations Administered on Date of Encounter - 5/1/2017     None      Language Assistance Services     ATTENTION: Language assistance services are available, free of charge. Please call [phone].      ATENCIÓN: Si habla naseem, tiene a perez disposición servicios gratuitos de asistencia lingüística. Llame al [phone].     CHÚ Ý: N?u b?n nói Ti?ng Vi?t, có các d?ch v? h? tr? ngôn ng? mi?n phí dành cho b?n. G?i s? [phone].         [facility] complies with applicable Federal civil rights laws and does not discriminate on the basis of race, color, national origin, age, disability, or sex.

## 2017-05-01 NOTE — TELEPHONE ENCOUNTER
----- Message from Tess Dallas sent at 5/1/2017  9:25 AM CDT -----  Contact: pt  Pls call pt at [phone].  She has not been feeling well and her bp has been running high for the past week and would like to come in to see Dr. Camarena.  She says that she is having head pressure and the readings have her feeling scared.   She gave me a list of some readings.    4/27  173/62 5:00pm    4/28  145/52 1:00pm  195/72 8:00pm  188/74 10:00pm    4/29  123/46 11:00am  193/70 5:00pm  196/73 9:00pm    4/30 137/53    5/1 149/58 9:00am    Thanks

## 2017-05-01 NOTE — TELEPHONE ENCOUNTER
Spoke with pt, offered to arrange appointment with nurse practitioner for evaluation. Pt states she will call 's office for advice.

## 2017-05-01 NOTE — TELEPHONE ENCOUNTER
----- Message from Rebecca Almendarez sent at 5/1/2017  3:59 PM CDT -----  Contact: pt [phone]  Pt states pharmacy does not have medication labetalol (NORMODYNE) 100 MG tablet

## 2017-05-01 NOTE — PROGRESS NOTES
Subjective:       Patient ID: Gerda Lai is a 84 y.o. female.    Chief Complaint: Blood Pressure Check and Headache    HPI Comments: Disclaimer: This note has been generated using voice-recognition software. There may be typographical errors that have been missed during proof-reading    85 yo presents for follow up of hypertension.  Had been doing well on present medications, but noted elevated systolics over past 2-3 weeks, with systolics in 150-170s range, normal diastolics    Headache    Pertinent negatives include no dizziness, numbness or weakness.     Review of Systems   Constitutional: Negative for unexpected weight change.   Respiratory: Negative for chest tightness and shortness of breath.    Cardiovascular: Negative for chest pain, palpitations and leg swelling.   Neurological: Positive for headaches. Negative for dizziness, weakness, light-headedness and numbness.       Objective:      Physical Exam   Constitutional: She is oriented to person, place, and time. She appears well-developed and well-nourished. No distress.   Neck: Normal range of motion. No JVD present. No thyromegaly present.   Cardiovascular: Normal rate and regular rhythm.    No murmur heard.  Pulmonary/Chest: Effort normal and breath sounds normal. She has no wheezes. She has no rales.   Musculoskeletal: She exhibits no edema.   Neurological: She is alert and oriented to person, place, and time. She has normal reflexes.       Assessment:       1. Essential hypertension        Plan:       1.  Continue prior medications  2.  increase   Labetalol to 150 mg bid  3.  F/u 2-3 weeks

## 2017-05-02 ENCOUNTER — CLINICAL SUPPORT (OUTPATIENT)
Dept: REHABILITATION | Facility: HOSPITAL | Age: 82
End: 2017-05-02
Attending: ORTHOPAEDIC SURGERY
Payer: MEDICARE

## 2017-05-02 DIAGNOSIS — M25.552 LEFT HIP PAIN: ICD-10-CM

## 2017-05-02 PROCEDURE — 97140 MANUAL THERAPY 1/> REGIONS: CPT | Mod: KX,PO

## 2017-05-02 PROCEDURE — 97110 THERAPEUTIC EXERCISES: CPT | Mod: KX,PO

## 2017-05-02 NOTE — PROGRESS NOTES
Physical Therapy Progress Note     Name: Gerda Lai  Clinic Number: [id number]  Diagnosis:   Encounter Diagnosis   Name Primary?    Left hip pain      Physician: Cesar Segundo MD  Treatment Orders: PT Eval and Treat  Past Medical History:   Diagnosis Date    Arthritis     Basal cell carcinoma 09/2016    right post auricular neck     Breast cancer     Cataract     Fibromyalgia     Hyperlipidemia     Hypertension     Personal history of colonic polyps     SCC (squamous cell carcinoma) 2015    R chest    SCC (squamous cell carcinoma) 2016    left medial shoulder    Squamous cell carcinoma 2015    right forearm    Thyroid disease     Vaginitis        Precautions: standard  Evaluation Date: 3/1/2017  Visit # authorized: 16/ 20 (FOTO 3/28/17)  Authorization period: 12/31/2017  Plan of care Expiration: 4/26/17    G code 9 of 10    Time In: 0306 pm   Time Out: 0405 pm  Treatment time: 59 minutes    1 on1 time billable: 54 minutes    Missed visits:  no show(),  Cancellations()    Subjective     Pt reports she is having a rough time due to blood pressure issues. She reports she iced and used biofreeze prior to therapy today.  Pain Scale:  2/10 L lateral hip and knee pain. (in L lateral thigh)    Objective     Gerda received therapeutic exercises to develop strength, endurance, ROM, flexibility and core stabilization for 40 minutes including:     Supine TFL stretch 3 x 20 sec  TA pulldown YTB with hamstring curl on green theraball 2 x 10  Lateral band walk  with OTB 2 x 15 feet    Hip flexor stretch off mat 3 x 30s  Bridging + isometric hip abd/ER with BTB 3 x 10 with 3 sec hold  Sidelying Clamshells with BTB 2 x 10  Reverse clams 2 x 10 1#  hip extension at shuttle or plyobox 2 x 10 L2  BOSU side steps x 15  Shuttle with blue theraband for abduction 2 x 10 (2 bands)    To add at future sessions: lat step downs    Not performed today:  Shuttle leg press  with 2 bands + isometric hip abd/er GTB 3 x 10      Glute medius kick backs 2 x 10 (B) 1#  Gluteus medius stretch 3 x 30s  Hamstring 90-90 3 x 30s    Patient receives manual therapy for pain control and to improve flexibility x 15 minutes to include:   long leg distraction 5 x 20  sec (L)LE   Soft tissue massage (L) glutes,  (L) ITB, (L) lateral knee x 10 minutes.    Patient provided written and verbal consent to receive functional dry needling at today's visit. FDN performed to pt's L vastus lateralis, gluteus medius, and along IT band. FDN performed to reduce pain and muscle tension, promote blood flow, and improve ROM and function x 5 minutes (no charge). Pt tolerated tx well without adverse effects. She was educated on what to expect following the procedure and she verbalized understanding.     Written Home Exercises Provided: continue with current  Pt demo good understanding of the education provided. Gerda demonstrated good return demonstration of activities.   Pt has no cultural, educational or language barriers to learning      Assessment      Tolerated advancements in above activities well without adverse effects. Pt beginning to plateau with progress at this time. Pt will be D/C'd at end of next week.  Pt prognosis is Fair. Pt will continue to benefit from skilled outpatient physical therapy to address the deficits listed below in the problem list, provide pt/family education and to maximize pt's level of independence in the home and community environment.     Anticipated barriers to physical therapy: none    Pt's spiritual, cultural and educational needs considered and pt agreeable to plan of care and goals as stated below:     Short Term Goals: 4 weeks     - Pt to increase popliteal angle to within < or = 20 degrees of full ROM in order to show improved flexibility and posture.   - Pt to increase lumbar left sidebending ROM to at least 2 inches above patella in order to show improved mobility.  - Pt to  be independent and consistent with issued HEP in order to promote carryover between therapy sessions.  - Pt will be able to perform and hold an ab brace for 10 reps of 10 second hold with normal breathing and no cuing in order to demo improved core recruitment.     Long Term Goals: 8 weeks     -Pt will report a decrease in LBP to 0/10 to increase tolerance for ADLs.  -Pt will increase hip strength by 1 ms grade in order to increase tolerance to ADLs and functional activities.  -Pt will report at CK level (< or = to 45% impaired) on FOTO score for low back pain disability to demonstrate decrease in disability and improvement in back pain.  - Pt to be Independent with updated HEP in order to maintain gains following discharge from PT.     Plan   Continue with established Plan of Care towards PT goals.    Ashley Holstein, PT 5/2/2017

## 2017-05-04 ENCOUNTER — TELEPHONE (OUTPATIENT)
Dept: ORTHOPEDICS | Facility: CLINIC | Age: 82
End: 2017-05-04

## 2017-05-04 DIAGNOSIS — M54.50 LUMBAR SPINE PAIN: Primary | ICD-10-CM

## 2017-05-04 NOTE — TELEPHONE ENCOUNTER
Ortho Telephone Triage Call  [phone]  Patient C/O: Left hip pain level 9/10 that pt states has not improved with current physical therapy. Pt states pain is from left hip to left ankle, sometimes greatest at left knee. Pt confirms greater hip pain from changing from seated to standing position. Pt requests same day appt.   HX: L hip pain/bursitis/sciatica.  Seen by MENG Mckeon 2/23/17 who recommended pt be seen in Spine Clinic if symptoms unimproved.   Triage Advice: Pt advised that pain may be r/t lower back and that it is recommended that she be seen by Back and Spine provider. Pt states understanding.   Resolution: Appt scheduled tomorrow, 5/5/17, for xrays in Radiology at 2:30pm followed by appt at 3:00pm with MENG Marrero in Ortho Clinic. Pt states understanding and is active in [facility] for appt information.

## 2017-05-05 ENCOUNTER — HOSPITAL ENCOUNTER (OUTPATIENT)
Dept: RADIOLOGY | Facility: HOSPITAL | Age: 82
Discharge: HOME | End: 2017-05-05
Attending: ORTHOPAEDIC SURGERY | Admitting: ORTHOPAEDIC SURGERY
Payer: MEDICARE

## 2017-05-05 ENCOUNTER — CLINICAL SUPPORT (OUTPATIENT)
Dept: REHABILITATION | Facility: HOSPITAL | Age: 82
End: 2017-05-05
Attending: ORTHOPAEDIC SURGERY
Payer: MEDICARE

## 2017-05-05 ENCOUNTER — OFFICE VISIT (OUTPATIENT)
Dept: ORTHOPEDICS | Facility: CLINIC | Age: 82
End: 2017-05-05
Payer: MEDICARE

## 2017-05-05 VITALS
DIASTOLIC BLOOD PRESSURE: 68 MMHG | HEART RATE: 62 BPM | WEIGHT: 145.94 LBS | BODY MASS INDEX: 24.92 KG/M2 | HEIGHT: 64 IN | SYSTOLIC BLOOD PRESSURE: 155 MMHG

## 2017-05-05 DIAGNOSIS — M54.50 LUMBAR SPINE PAIN: ICD-10-CM

## 2017-05-05 DIAGNOSIS — M70.62 TROCHANTERIC BURSITIS OF LEFT HIP: ICD-10-CM

## 2017-05-05 DIAGNOSIS — M43.10 SPONDYLOLISTHESIS, UNSPECIFIED SPINAL REGION: Primary | ICD-10-CM

## 2017-05-05 DIAGNOSIS — M25.552 LEFT HIP PAIN: ICD-10-CM

## 2017-05-05 PROCEDURE — 99999 PR PBB SHADOW E&M-EST. PATIENT-LVL III: CPT | Mod: PBBFAC,,, | Performed by: PHYSICIAN ASSISTANT

## 2017-05-05 PROCEDURE — 20610 DRAIN/INJ JOINT/BURSA W/O US: CPT | Mod: S$PBB,LT,, | Performed by: PHYSICIAN ASSISTANT

## 2017-05-05 PROCEDURE — 72100 X-RAY EXAM L-S SPINE 2/3 VWS: CPT | Mod: 26,,, | Performed by: RADIOLOGY

## 2017-05-05 PROCEDURE — 72120 X-RAY BEND ONLY L-S SPINE: CPT | Mod: 26,,, | Performed by: RADIOLOGY

## 2017-05-05 PROCEDURE — 99213 OFFICE O/P EST LOW 20 MIN: CPT | Mod: PBBFAC,25 | Performed by: PHYSICIAN ASSISTANT

## 2017-05-05 PROCEDURE — G8979 MOBILITY GOAL STATUS: HCPCS | Mod: CK,PO

## 2017-05-05 PROCEDURE — 20610 DRAIN/INJ JOINT/BURSA W/O US: CPT | Mod: PBBFAC | Performed by: PHYSICIAN ASSISTANT

## 2017-05-05 PROCEDURE — 97110 THERAPEUTIC EXERCISES: CPT | Mod: PO

## 2017-05-05 PROCEDURE — 99214 OFFICE O/P EST MOD 30 MIN: CPT | Mod: 25,S$PBB,, | Performed by: PHYSICIAN ASSISTANT

## 2017-05-05 PROCEDURE — G8978 MOBILITY CURRENT STATUS: HCPCS | Mod: CK,PO

## 2017-05-05 PROCEDURE — 97140 MANUAL THERAPY 1/> REGIONS: CPT | Mod: PO

## 2017-05-05 RX ORDER — TRIAMCINOLONE ACETONIDE 40 MG/ML
40 INJECTION, SUSPENSION INTRA-ARTICULAR; INTRAMUSCULAR
Status: COMPLETED | OUTPATIENT
Start: 2017-05-05 | End: 2017-05-05

## 2017-05-05 RX ADMIN — TRIAMCINOLONE ACETONIDE 40 MG: 40 INJECTION, SUSPENSION INTRA-ARTICULAR; INTRAMUSCULAR at 04:05

## 2017-05-05 NOTE — LETTER
May 5, 2017      Bianca Hu PA-C  [address] [facility] [address]           [facility]  [address]  [place] [address]  Phone: [phone]          Patient: Gerda Lai   MR Number: [id number]   YOB: 1933   Date of Visit: 5/5/2017       Dear Bianca Hu:    Thank you for referring Gerda Lai to me for evaluation. Attached you will find relevant portions of my assessment and plan of care.    If you have questions, please do not hesitate to call me. I look forward to following Gerda Lai along with you.    Sincerely,    Amy Gibson PA-C    Enclosure  CC:  No Recipients    If you would like to receive this communication electronically, please contact [email or web address] or [phone] to request more information on [employer] Link access.    For providers and/or their staff who would like to refer a patient to [facility], please contact us through our one-stop-shop provider referral line, [facility], at [phone].    If you feel you have received this communication in error or would no longer like to receive these types of communications, please e-mail [email or web address]

## 2017-05-05 NOTE — PROGRESS NOTES
DATE: 5/5/2017  PATIENT: Gerda Lai    Supervising Physician: Hardy Keys M.D.    CHIEF COMPLAINT: left leg pain    HISTORY:  Gerda Lai is a 84 y.o. female here for initial evaluation of left lateral leg pain (Back - 0, Leg - 6).  The pain has been present for a few months. The patient describes the pain as aching.  The pain is worse with walking and laying on her side and improved by sitting. There is no associated numbness and tingling. There is no subjective weakness. Prior treatments have included tylenol and physical therapy, but no ESIs or surgery.    The patient denies myelopathic symptoms such as handwriting changes or difficulty with buttons/coins/keys. Denies perineal paresthesias, bowel/bladder dysfunction.    PAST MEDICAL/SURGICAL HISTORY:  Past Medical History:   Diagnosis Date    Arthritis     Basal cell carcinoma 09/2016    right post auricular neck     Breast cancer     Cataract     Fibromyalgia     Hyperlipidemia     Hypertension     Personal history of colonic polyps     SCC (squamous cell carcinoma) 2015    R chest    SCC (squamous cell carcinoma) 2016    left medial shoulder    Squamous cell carcinoma 2015    right forearm    Thyroid disease     Vaginitis      Past Surgical History:   Procedure Laterality Date    ADENOIDECTOMY      CATARACT EXTRACTION W/  INTRAOCULAR LENS IMPLANT Bilateral     MASTECTOMY      partial right    thyriodectomy      partial    tonsillectomy      TOTAL HIP ARTHROPLASTY      right    Yag  Left        Medications:   Current Outpatient Prescriptions on File Prior to Visit   Medication Sig Dispense Refill    acetaminophen (TYLENOL) 650 MG TbSR Take 650 mg by mouth as needed.      ascorbic acid (VITAMIN C) 100 MG tablet Take 100 mg by mouth once daily.      B INFANTIS/B ANI/B JOSE/B BIFID (PROBIOTIC 4X ORAL) Take 1 tablet by mouth daily      chlorthalidone (HYGROTEN) 25 MG Tab Take 25 mg by mouth once daily.  11    coenzyme Q10 (CO  Q-10) 100 mg capsule Take 100 mg by mouth once daily.      fluticasone (FLONASE) 50 mcg/actuation nasal spray USE 1 SPRAY IN EACH NOSTRIL ONCE DAILY (Patient taking differently: as needed. ) 16 g 3    glucosamine-chondroitin 500-400 mg tablet Take 1 tablet by mouth 2 (two) times daily.       labetalol (NORMODYNE) 100 MG tablet Take one and one half tablets twice daily 90 tablet 3    lorazepam (ATIVAN) 0.5 MG tablet TAKE ONE HALF TABLET 2 TIMES A DAY 60 tablet 1    multivitamin capsule Take 1 capsule by mouth once daily.      omeprazole (PRILOSEC OTC) 20 MG tablet Take 20 mg by mouth once daily.        pravastatin (PRAVACHOL) 40 MG tablet TAKE 1 TABLET (40 MG TOTAL) BY MOUTH ONCE DAILY. 90 tablet 3    TEKTURNA 300 mg Tab TAKE 1 TABLET (300 MG TOTAL) BY MOUTH ONCE DAILY. 30 tablet 10    UNABLE TO FIND once daily. OTC EYE DROP; Systane       No current facility-administered medications on file prior to visit.        Social History:   Social History     Social History    Marital status: Single     Spouse name: N/A    Number of children: N/A    Years of education: N/A     Occupational History     Retired     Social History Main Topics    Smoking status: Never Smoker    Smokeless tobacco: Never Used    Alcohol use 0.0 oz/week     2 - 3 Glasses of wine per week      Comment: socially    Drug use: No    Sexual activity: No     Other Topics Concern    Not on file     Social History Narrative       REVIEW OF SYSTEMS:  Constitution: Negative. Negative for chills, fever and night sweats.   Cardiovascular: Negative for chest pain and syncope.   Respiratory: Negative for cough and shortness of breath.   Gastrointestinal: See HPI. Negative for nausea/vomiting. Negative for abdominal pain.  Genitourinary: See HPI. Negative for discoloration or dysuria.  Skin: Negative for dry skin, itching and rash.   Hematologic/Lymphatic: Negative for bleeding problem. Does not bruise/bleed easily.   Musculoskeletal: Negative  "for falls and muscle weakness.   Neurological: See HPI. No seizures.   Endocrine: Negative for polydipsia, polyphagia and polyuria.   Allergic/Immunologic: Negative for hives and persistent infections.     EXAM:  BP (!) 155/68 (BP Location: Left arm, Patient Position: Sitting, BP Method: Automatic)  Pulse 62  Ht 5' 4" (1.626 m)  Wt 66.2 kg (145 lb 15.1 oz)  BMI 25.05 kg/m2    General: The patient is a very pleasant 84 y.o. female in no apparent distress, the patient is oriented to person, place and time.  Psych: Normal mood and affect  HEENT: Vision grossly intact, hearing intact to the spoken word.  Lungs: Respirations unlabored.  Gait: Normal station and gait, no difficulty with toe or heel walk.   Skin: Dorsal lumbar skin negative for rashes, lesions, hairy patches and surgical scars. There is no lumbar tenderness to palpation.  Range of motion: Lumbar range of motion is acceptable.  Spinal Balance: Global saggital and coronal spinal balance acceptable, not significant for scoliosis and kyphosis.  Musculoskeletal: No pain with the range of motion of the bilateral hips. Moderate left trochanteric tenderness to palpation.  Vascular: Bilateral lower extremities warm and well perfused, dorsalis pedis pulses 2+ bilaterally.  Neurological: Normal strength and tone in all major motor groups in the bilateral lower extremities. Normal sensation to light touch in the L2-S1 dermatomes bilaterally.  Deep tendon reflexes symmetric 1+ in the bilateral lower extremities.  Negative Babinski bilaterally. Straight leg raise negative bilaterally.    IMAGING:      Today I personally reviewed AP, Lat and Flex/Ex  upright L-spine films that demonstrate grade I anterolisthesis of L4/5.  There is significant disc space narrowing.    ASSESSMENT/PLAN:    Diagnoses and all orders for this visit:    Spondylolisthesis, unspecified spinal region    Trochanteric bursitis of left hip      The patient has symptoms of left hip bursitis vs " lumbar radiculopathy.  She would like to try an injection in the left hip bursa today.  Continue PT.  Follow up as needed.      Return if symptoms worsen or fail to improve.      PROCEDURE:  I have explained the risks, benefits, and alternatives of the procedure in detail.  The patient voices understanding and all questions have been answered.  The patient agrees to proceed as planned. So after I performed a sterile pre of the skin in the normal fashion the left greater trochanteric area is injected from the lateral approach using an 2 inch 22 gauge needle with a combination of 4cc 1% lidocaine and 40 mg of kenalog.  The patient is cautioned an immediate relief of pain is secondary to the local anesthetic and will be temporary.  After the anesthetic wears off there may be a increase in pain that may last for a few hours or a few days and they should use ice to help alleviate this flair up of pain.

## 2017-05-05 NOTE — PROGRESS NOTES
Physical Therapy Progress Note     Name: Gerda Lai  Clinic Number: [id number]  Diagnosis:   Encounter Diagnosis   Name Primary?    Left hip pain      Physician: Cesar Segundo MD  Treatment Orders: PT Eval and Treat  Past Medical History:   Diagnosis Date    Arthritis     Basal cell carcinoma 09/2016    right post auricular neck     Breast cancer     Cataract     Fibromyalgia     Hyperlipidemia     Hypertension     Personal history of colonic polyps     SCC (squamous cell carcinoma) 2015    R chest    SCC (squamous cell carcinoma) 2016    left medial shoulder    Squamous cell carcinoma 2015    right forearm    Thyroid disease     Vaginitis        Precautions: standard  Evaluation Date: 3/1/2017  Visit # authorized: 17/ 20 (FOTO 3/28/17)  Authorization period: 12/31/2017  Plan of care Expiration: 4/26/17    G code 1 of 10    Time In: 0107 pm   Time Out: 0147 pm  Treatment time: 40 minutes    1 on1 time billable: 30 minutes    Missed visits:  no show(),  Cancellations()    Subjective     Pt reports she has been feeling really bad prior to her session today. She is going to an appt with orthopedics after this session in order to get evaluated.  Pain Scale: 6-7 /10 L lateral hip and knee pain. (in L lateral thigh)    Objective     Gerda received therapeutic exercises to develop strength, endurance, ROM, flexibility and core stabilization for 25 minutes including:     Supine TFL stretch 3 x 20 sec  SKTC 3 x 20s  Lateral band walk  with OTB 2 x 15 feet    Hip flexor stretch off mat 3 x 30s  Bridging + isometric hip abd/ER with BTB 3 x 10 with 3 sec hold  Sidelying Clamshells with BTB 2 x 10  Seated ball rollouts forward 10 x 10s  TA pulldown YTB with hamstring curl on green theraball 2 x 10      Not performed today:  Shuttle leg press with 2 bands + isometric hip abd/er GTB 3 x 10      Glute medius kick backs 2 x 10 (B) 1#  Gluteus medius stretch 3  x 30s  Hamstring 90-90 3 x 30s  hip extension at shuttle or plyobox 2 x 10 L2  BOSU side steps x 15  Reverse clams 2 x 10 1#  Shuttle with blue theraband for abduction 2 x 10 (2 bands)    Patient receives manual therapy for pain control and to improve flexibility x 15 minutes to include:   long leg distraction 5 x 20  sec (L)LE   Soft tissue massage (L) glutes,  (L) ITB, (L) lateral knee x 10 minutes  Lumbar gapping in SL      Patient provided written and verbal consent to receive functional dry needling at today's visit. FDN performed to pt's L vastus lateralis, gluteus medius, and along IT band. FDN performed to reduce pain and muscle tension, promote blood flow, and improve ROM and function x 5 minutes (no charge). Pt tolerated tx well without adverse effects. She was educated on what to expect following the procedure and she verbalized understanding. NOT TODAY    Written Home Exercises Provided: continue with current  Pt demo good understanding of the education provided. Gerda demonstrated good return demonstration of activities.   Pt has no cultural, educational or language barriers to learning      CMS Impairment/Limitation/Restriction for FOTO Hip Survey  Status Limitation G-Code CMS Severity Modifier  Intake 48% 52%  Predicted 55% 45% Goal Status+ CK - At least 40 percent but less than 60 percent  3/28/2017 48% 52%  5/5/2017 50% 50% Current Status CK - At least 40 percent but less than 60 percent    Assessment      Exercises reduced today due to flare up of low back and L LE pain. Increased time spent on manual therapy after which pt reported a reduction of discomfort. Pt going to MD today for further evaluation of pain flare. Discharge is still planned for next week as she has reached a plateau with therapy and she was going to follow up with MD regarding continued pain. Pt prognosis is Fair. Pt will continue to benefit from skilled outpatient physical therapy to address the deficits listed below in the  problem list, provide pt/family education and to maximize pt's level of independence in the home and community environment.     Anticipated barriers to physical therapy: none    Pt's spiritual, cultural and educational needs considered and pt agreeable to plan of care and goals as stated below:     Short Term Goals: 4 weeks     - Pt to increase popliteal angle to within < or = 20 degrees of full ROM in order to show improved flexibility and posture.   - Pt to increase lumbar left sidebending ROM to at least 2 inches above patella in order to show improved mobility.  - Pt to be independent and consistent with issued HEP in order to promote carryover between therapy sessions.  - Pt will be able to perform and hold an ab brace for 10 reps of 10 second hold with normal breathing and no cuing in order to demo improved core recruitment.     Long Term Goals: 8 weeks     -Pt will report a decrease in LBP to 0/10 to increase tolerance for ADLs.  -Pt will increase hip strength by 1 ms grade in order to increase tolerance to ADLs and functional activities.  -Pt will report at CK level (< or = to 45% impaired) on FOTO score for low back pain disability to demonstrate decrease in disability and improvement in back pain.  - Pt to be Independent with updated HEP in order to maintain gains following discharge from PT.     Plan   Continue with established Plan of Care towards PT goals.    Ashley Holstein, PT 5/5/2017

## 2017-05-09 ENCOUNTER — CLINICAL SUPPORT (OUTPATIENT)
Dept: REHABILITATION | Facility: HOSPITAL | Age: 82
End: 2017-05-09
Attending: ORTHOPAEDIC SURGERY
Payer: MEDICARE

## 2017-05-09 DIAGNOSIS — M25.552 LEFT HIP PAIN: ICD-10-CM

## 2017-05-09 PROCEDURE — 97110 THERAPEUTIC EXERCISES: CPT | Mod: PO

## 2017-05-09 NOTE — PROGRESS NOTES
Physical Therapy Progress Note     Name: Gerda Lai  Clinic Number: [id number]  Diagnosis:   Encounter Diagnosis   Name Primary?    Left hip pain      Physician: Cesar Segundo MD  Treatment Orders: PT Eval and Treat  Past Medical History:   Diagnosis Date    Arthritis     Basal cell carcinoma 09/2016    right post auricular neck     Breast cancer     Cataract     Fibromyalgia     Hyperlipidemia     Hypertension     Personal history of colonic polyps     SCC (squamous cell carcinoma) 2015    R chest    SCC (squamous cell carcinoma) 2016    left medial shoulder    Squamous cell carcinoma 2015    right forearm    Thyroid disease     Vaginitis        Precautions: standard  Evaluation Date: 3/1/2017  Visit # authorized: 17/ 20 (FOTO 3/28/17)  Authorization period: 12/31/2017  Plan of care Expiration: 4/26/17    G code 2 of 10    Time In: 03:10 pm   Time Out: 4:00 pm  Treatment time: 50 minutes    1 on1 time billable: 25 minutes    Missed visits:  no show(),  Cancellations()    Subjective     Pt reports receiving a (L) hip injection last week with improved pain control now. She does c/o some persistent (B) knee pain but this is tolerable. She states that MD also said she has a back condition that could be contributing to symptoms( Spondylolisthesis)  Pain Scale: 2/10 L lateral hip and (B) knee pain.      Objective     Gerda received therapeutic exercises to develop strength, endurance, ROM, flexibility and core stabilization for 40 minutes including:     Supine TFL stretch 3 x 20 sec  SKTC 3 x 20s  Hip flexor stretch off mat 3 x 30s  Bridging + isometric hip abd/ER with BTB 3 x 10 with 3 sec hold  Sidelying Clamshells with BTB 2 x 10  TA pulldown YTB with hamstring curl on green theraball 2 x 10  Glute medius kick backs 2 x 10 (B)    Seated ball rollouts forward 10 x 10s  Lateral band walk  with OTB 4 x 10 feet    hip extension at   plyobox 2 x 10  L3 2 bands (U)      Not performed today:  Shuttle leg press with 2 bands + isometric hip abd/er GTB 3 x 10      Gluteus medius stretch 3 x 30s  Hamstring 90-90 3 x 30s  BOSU side steps x 15  Reverse clams 2 x 10 1#  Shuttle with blue theraband for abduction 2 x 10 (2 bands)    Patient receives manual therapy for pain control and to improve flexibility x 10 minutes to include:   long axis distraction 5 x 20  sec (L)LE   Stick massage  (L) glutes/piriformis,  (L) ITB, (L) lateral knee   Lumbar gapping in SL      Patient provided written and verbal consent to receive functional dry needling at today's visit. FDN performed to pt's L vastus lateralis, gluteus medius, and along IT band. FDN performed to reduce pain and muscle tension, promote blood flow, and improve ROM and function x 5 minutes (no charge). Pt tolerated tx well without adverse effects. She was educated on what to expect following the procedure and she verbalized understanding. NOT TODAY    Written Home Exercises Provided: continue with current  Pt demo good understanding of the education provided. Gerda demonstrated good return demonstration of activities.   Pt has no cultural, educational or language barriers to learning         Assessment      Patient able to resume ex additional performance today reporting improved (L) hip pain symptoms after recent hip injection.   She was able to perform the above ex's/activities without increased pain symptoms and reports pain level was slight to none post Tx.. Pt prognosis is Fair. Pt will continue to benefit from skilled outpatient physical therapy to address the deficits listed below in the problem list, provide pt/family education and to maximize pt's level of independence in the home and community environment.     Anticipated barriers to physical therapy: none    Pt's spiritual, cultural and educational needs considered and pt agreeable to plan of care and goals as stated below:     Short Term Goals: 4  weeks     - Pt to increase popliteal angle to within < or = 20 degrees of full ROM in order to show improved flexibility and posture.   - Pt to increase lumbar left sidebending ROM to at least 2 inches above patella in order to show improved mobility.  - Pt to be independent and consistent with issued HEP in order to promote carryover between therapy sessions.  - Pt will be able to perform and hold an ab brace for 10 reps of 10 second hold with normal breathing and no cuing in order to demo improved core recruitment.     Long Term Goals: 8 weeks     -Pt will report a decrease in LBP to 0/10 to increase tolerance for ADLs.  -Pt will increase hip strength by 1 ms grade in order to increase tolerance to ADLs and functional activities.  -Pt will report at CK level (< or = to 45% impaired) on FOTO score for low back pain disability to demonstrate decrease in disability and improvement in back pain.  - Pt to be Independent with updated HEP in order to maintain gains following discharge from PT.     Plan   Continue with established Plan of Care towards PT goals.    Milton Santana, PTA 5/9/2017

## 2017-05-11 ENCOUNTER — CLINICAL SUPPORT (OUTPATIENT)
Dept: REHABILITATION | Facility: HOSPITAL | Age: 82
End: 2017-05-11
Attending: ORTHOPAEDIC SURGERY
Payer: MEDICARE

## 2017-05-11 DIAGNOSIS — M25.552 LEFT HIP PAIN: ICD-10-CM

## 2017-05-11 PROCEDURE — G8980 MOBILITY D/C STATUS: HCPCS | Mod: CK,PO

## 2017-05-11 PROCEDURE — G8979 MOBILITY GOAL STATUS: HCPCS | Mod: CK,PO

## 2017-05-11 PROCEDURE — 97140 MANUAL THERAPY 1/> REGIONS: CPT | Mod: PO

## 2017-05-11 PROCEDURE — 97110 THERAPEUTIC EXERCISES: CPT | Mod: PO

## 2017-05-11 NOTE — PROGRESS NOTES
Physical Therapy Progress Note     Name: Gerda Lai  Clinic Number: [id number]  Diagnosis:   Encounter Diagnosis   Name Primary?    Left hip pain      Physician: Cesar Segundo MD  Treatment Orders: PT Eval and Treat  Past Medical History:   Diagnosis Date    Arthritis     Basal cell carcinoma 09/2016    right post auricular neck     Breast cancer     Cataract     Fibromyalgia     Hyperlipidemia     Hypertension     Personal history of colonic polyps     SCC (squamous cell carcinoma) 2015    R chest    SCC (squamous cell carcinoma) 2016    left medial shoulder    Squamous cell carcinoma 2015    right forearm    Thyroid disease     Vaginitis        Precautions: standard  Evaluation Date: 3/1/2017  Visit # authorized: 18/ 20   Authorization period: 12/31/2017  Plan of care Expiration: 4/26/17    G code 1 of 10    Time In: 02:10 pm   Time Out: 03:00 pm  Treatment time: 50 minutes    1 on1 time billable: 30 minutes    Missed visits:  no show(),  Cancellations()    Subjective     Pt reports Pt reports she feels much better after receiving an injection last week. She reports she has no problem asking the MD for more therapy should she need more.  Pain Scale: 2/10 L lateral hip and (B) knee pain.      Objective     Lumbar Range of Motion:     Degrees Pain   Flexion Full ROM with flattened L/S --       Extension 100% --       Left Side Bending 1 inch above knee (decreased C curve) Stretching in L side of thigh       Right Side Bending At knee (decreased C curve) --       Left rotation 90% --       Right Rotation 90% --              Lower Extremity Strength  Right LE   Left LE     Knee extension: 4+/5 Knee extension: 5/5   Knee flexion: 4+/5 Knee flexion: 4+/5   Hip flexion: 4/5 Hip flexion: 4+/5   Hip extension:  4/5 Hip extension: 4/5   Hip abduction: 4-5 Hip abduction: 4-/5   Hip adduction: 3/5 Hip adduction 3/5   Ankle dorsiflexion: 4+/5 Ankle  dorsiflexion: 4+/5        Flexibility:=  Ely's test: R =negative; L = negative  Popliteal Angle: R = -15 degrees; L = -22 degrees         CMS Impairment/Limitation/Restriction for FOTO Hip Survey  Status Limitation G-Code CMS Severity Modifier  Intake 48% 52%  Predicted 55% 45% Goal Status+ CK - At least 40 percent but less than 60 percent  3/28/2017 48% 52%  5/5/2017 50% 50%  5/11/2017 55% 45% Current Status CK - At least 40 percent but less than 60 percent    Gerda received therapeutic exercises to develop strength, endurance, ROM, flexibility and core stabilization for 40 minutes including:     Supine TFL stretch 3 x 20 sec  SKTC 3 x 20s  Hip flexor stretch off mat 3 x 30s  Bridging + isometric hip abd/ER with BTB 3 x 10 with 3 sec hold  Sidelying Clamshells with BTB 2 x 10  TA pulldown YTB with hamstring curl on green theraball 2 x 10  Glute medius kick backs 2 x 10 (B)    Seated ball rollouts forward 10 x 10s  Lateral band walk  with OTB 4 x 10 feet    hip extension at   plyobox 2 x 10 L3 2 bands (U)      Not performed today:  Shuttle leg press with 2 bands + isometric hip abd/er GTB 3 x 10      Gluteus medius stretch 3 x 30s  Hamstring 90-90 3 x 30s  BOSU side steps x 15  Reverse clams 2 x 10 1#  Shuttle with blue theraband for abduction 2 x 10 (2 bands)    Patient receives manual therapy for pain control and to improve flexibility x 10 minutes to include:   long axis distraction 5 x 20  sec (L)LE   Stick massage  (L) glutes/piriformis,  (L) ITB, (L) lateral knee   Lumbar gapping in SL      Patient provided written and verbal consent to receive functional dry needling at today's visit. FDN performed to pt's L vastus lateralis, gluteus medius, and along IT band. FDN performed to reduce pain and muscle tension, promote blood flow, and improve ROM and function x 5 minutes (no charge). Pt tolerated tx well without adverse effects. She was educated on what to expect following the procedure and she verbalized  understanding. NOT TODAY    Written Home Exercises Provided: continue with current  Pt demo good understanding of the education provided. Gerda demonstrated good return demonstration of activities.   Pt has no cultural, educational or language barriers to learning         Assessment     Pt has made good progress with therapy at this time meeting all STGs and 3/4 LTGs. She is independent with therex performance; therefore, she will be able to continue to maintain therapy gains following discharge. Pt continues with L hip pain at times; however, she reports it is reduced with stretching and with cortisone injections as needed. She has completed a full episode of therapy and reached maximum rehab potential so she will be discharged at this time.    Anticipated barriers to physical therapy: none    Pt's spiritual, cultural and educational needs considered and pt agreeable to plan of care and goals as stated below:     Short Term Goals: 4 weeks     - Pt to increase popliteal angle to within < or = 20 degrees of full ROM in order to show improved flexibility and posture. Met  - Pt to increase lumbar left sidebending ROM to at least 2 inches above patella in order to show improved mobility. Met  - Pt to be independent and consistent with issued HEP in order to promote carryover between therapy sessions. Met  - Pt will be able to perform and hold an ab brace for 10 reps of 10 second hold with normal breathing and no cuing in order to demo improved core recruitment. Met     Long Term Goals: 8 weeks     -Pt will report a decrease in LBP to 0/10 to increase tolerance for ADLs. Not met  -Pt will increase hip strength by 1 ms grade in order to increase tolerance to ADLs and functional activities. Met  -Pt will report at CK level (< or = to 45% impaired) on FOTO score for low back pain disability to demonstrate decrease in disability and improvement in back pain. Met  - Pt to be Independent with updated HEP in order to maintain  gains following discharge from PT. Met     Plan       Discharge from PT    Ashley Holstein, PT 5/11/2017

## 2017-05-15 PROBLEM — M25.552 LEFT HIP PAIN: Status: RESOLVED | Noted: 2017-03-01 | Resolved: 2017-05-15

## 2017-05-17 ENCOUNTER — OFFICE VISIT (OUTPATIENT)
Dept: FAMILY MEDICINE | Facility: CLINIC | Age: 82
End: 2017-05-17
Payer: MEDICARE

## 2017-05-17 VITALS
HEIGHT: 63 IN | SYSTOLIC BLOOD PRESSURE: 124 MMHG | DIASTOLIC BLOOD PRESSURE: 68 MMHG | BODY MASS INDEX: 25.78 KG/M2 | HEART RATE: 58 BPM | TEMPERATURE: 98 F | WEIGHT: 145.5 LBS

## 2017-05-17 DIAGNOSIS — E78.00 PURE HYPERCHOLESTEROLEMIA: ICD-10-CM

## 2017-05-17 DIAGNOSIS — I10 ESSENTIAL HYPERTENSION: Primary | ICD-10-CM

## 2017-05-17 LAB
CREAT UR-MCNC: 103 MG/DL
MICROALBUMIN UR DL<=1MG/L-MCNC: 10 UG/ML
MICROALBUMIN/CREATININE RATIO: 9.7 UG/MG

## 2017-05-17 PROCEDURE — 99213 OFFICE O/P EST LOW 20 MIN: CPT | Mod: S$PBB,,, | Performed by: FAMILY MEDICINE

## 2017-05-17 PROCEDURE — 82570 ASSAY OF URINE CREATININE: CPT

## 2017-05-17 PROCEDURE — 99999 PR PBB SHADOW E&M-EST. PATIENT-LVL III: CPT | Mod: PBBFAC,,, | Performed by: FAMILY MEDICINE

## 2017-05-17 PROCEDURE — 99213 OFFICE O/P EST LOW 20 MIN: CPT | Mod: PBBFAC,PO | Performed by: FAMILY MEDICINE

## 2017-05-17 NOTE — MR AVS SNAPSHOT
[facility]  [address] [facility], [address]  [facility] [address]  Phone: [phone]  Fax: [phone]                  Gerda Lai   2017 2:40 PM   Office Visit    Description:  Female : 1933   Provider:  Amy Colbert DO   Department:  [facility]           Reason for Visit     Hypertension     Dizziness           Diagnoses this Visit        Comments    Essential hypertension    -  Primary            To Do List           Future Appointments        Provider Department Dept Phone    2017 9:00 AM [id number] [phone]    2017 10:30 AM MD Giorgio Lilly - Hematology Oncology [phone]      Goals (5 Years of Data)     None      [facility]     [facility] Nurse Care Line -  Assistance  Unless otherwise directed by your provider, please contact [facility], our nurse care line that is available for  assistance.     Registered nurses in the [facility] provide: appointment scheduling, clinical advisement, health education, and other advisory services.  Call: [phone] (toll free)               Medications           Message regarding Medications     Verify the changes and/or additions to your medication regime listed below are the same as discussed with your clinician today.  If any of these changes or additions are incorrect, please notify your healthcare provider.             Verify that the below list of medications is an accurate representation of the medications you are currently taking.  If none reported, the list may be blank. If incorrect, please contact your healthcare provider. Carry this list with you in case of emergency.           Current Medications     ascorbic acid (VITAMIN C) 100 MG tablet Take 100 mg by mouth once daily.    B INFANTIS/B ANI/B JOSE/B BIFID (PROBIOTIC 4X ORAL) Take 1 tablet by mouth daily    chlorthalidone (HYGROTEN) 25 MG Tab Take  "25 mg by mouth once daily.    coenzyme Q10 (CO Q-10) 100 mg capsule Take 100 mg by mouth once daily.    fluticasone (FLONASE) 50 mcg/actuation nasal spray USE 1 SPRAY IN EACH NOSTRIL ONCE DAILY    glucosamine-chondroitin 500-400 mg tablet Take 1 tablet by mouth 2 (two) times daily.     labetalol (NORMODYNE) 100 MG tablet Take one and one half tablets twice daily    lorazepam (ATIVAN) 0.5 MG tablet TAKE ONE HALF TABLET 2 TIMES A DAY    multivitamin capsule Take 1 capsule by mouth once daily.    omeprazole (PRILOSEC OTC) 20 MG tablet Take 20 mg by mouth once daily.      pravastatin (PRAVACHOL) 40 MG tablet TAKE 1 TABLET (40 MG TOTAL) BY MOUTH ONCE DAILY.    TEKTURNA 300 mg Tab TAKE 1 TABLET (300 MG TOTAL) BY MOUTH ONCE DAILY.    UNABLE TO FIND once daily. OTC EYE DROP; Systane    acetaminophen (TYLENOL) 650 MG TbSR Take 650 mg by mouth as needed.           Clinical Reference Information           Your Vitals Were     BP Pulse Temp Height Weight BMI    122/70 58 97.5 °F (36.4 °C) 5' 3" (1.6 m) 66 kg (145 lb 8.1 oz) 25.77 kg/m2      Blood Pressure          Most Recent Value    BP  122/70      Allergies as of 5/17/2017     Voltaren [Diclofenac Sodium]    Clarithromycin    Losartan    Flexeril [Cyclobenzaprine]    Iodine And Iodide Containing Products    Lisinopril    Norvasc [Amlodipine]    Tramadol    Metoprolol    Sulfa (Sulfonamide Antibiotics)    Verapamil (Bulk)      Immunizations Administered on Date of Encounter - 5/17/2017     None      Orders Placed During Today's Visit      Normal Orders This Visit    Microalbumin/creatinine urine ratio       Language Assistance Services     ATTENTION: Language assistance services are available, free of charge. Please call [phone].      ATENCIÓN: Si sgla naseem, tiene a perez disposición servicios gratuitos de asistencia lingüística. Llame al [phone].     CHÚ Ý: N?u b?n nói Ti?ng Vi?t, có các d?ch v? h? tr? ngôn ng? mi?n phí dành cho b?n. G?i s? [phone].   "       [facility] complies with applicable Federal civil rights laws and does not discriminate on the basis of race, color, national origin, age, disability, or sex.

## 2017-05-17 NOTE — PROGRESS NOTES
Subjective:       Patient ID: Gerda Lai is a 84 y.o. female.    Chief Complaint: Hypertension (2 week follow up) and Dizziness    HPI   This pleasant 84 y.o. White female  presents for management  of their medical problems including   Patient Active Problem List   Diagnosis    Personal history of breast cancer    Hypertension, essential    Bilateral dry eyes - Both Eyes    Aftercataract - Right Eye    Osteoarthritis of both feet    HLD (hyperlipidemia)    Hearing loss, sensorineural    Osteoarthritis of knees, bilateral    Osteoarthritis of hands, bilateral    Cervical spondylosis    Right knee pain    Malignant neoplasm of right breast    Personal history of skin cancer    Bilateral shoulder pain    Paresthesias    Myalgia    Osteoarthritis    Paresthesia of left leg    Trochanteric bursitis, left hip    Lumbosacral radiculopathy at L5    Head trauma    Osteoarthritis of left hip    History of total right hip replacement   she has just completed some rehab for some leg pain. She also had a steroid shot in the left hip for bursitis and that has helped.   She states she isnt sleeping well - she is up at night to urinate due to the chlorthalidone.  She is trying to watch her salt.   She is feeling lightheaded since the labetolol was increased   Review of Systems  per HPI  Objective:      Physical Exam   Constitutional: She is oriented to person, place, and time. She appears well-developed and well-nourished.   HENT:   Head: Normocephalic and atraumatic.   Neck: Normal range of motion. Neck supple.   Cardiovascular: Normal rate, regular rhythm, normal heart sounds and intact distal pulses.    Pulmonary/Chest: Effort normal and breath sounds normal.   Neurological: She is alert and oriented to person, place, and time.   Skin: Skin is warm and dry.   Psychiatric: She has a normal mood and affect. Her behavior is normal. Judgment and thought content normal.   Vitals reviewed.      Assessment:          Gerda was seen today for hypertension and dizziness.    Diagnoses and all orders for this visit:    Essential hypertension  -     Microalbumin/creatinine urine ratio  Will reduce labetolol down by 1/2 a tablet   Take a full at night but a half in the morning  Also Take chlorthalidone in the morning instead of the afternoon so the worst of the diretic will wear off by evening and hopefully it wont keep her up so much and she can get a better nights sleep    Pure hypercholesterolemia  Continuing current management.    Recheck in 1 month with Dr Sellers

## 2017-05-22 ENCOUNTER — TELEPHONE (OUTPATIENT)
Dept: FAMILY MEDICINE | Facility: CLINIC | Age: 82
End: 2017-05-22

## 2017-05-22 ENCOUNTER — NURSE TRIAGE (OUTPATIENT)
Dept: ADMINISTRATIVE | Facility: CLINIC | Age: 82
End: 2017-05-22

## 2017-05-22 NOTE — TELEPHONE ENCOUNTER
Only change was labetalol was cut by 1/2 tab.    Can she take her BP at home?  Any other signs?  Facial numbness, trouble sepaking, SOB chest pain?

## 2017-05-22 NOTE — TELEPHONE ENCOUNTER
Reason for Disposition   Taking a medicine that could cause dizziness (e.g., blood pressure medications, diuretics)    Protocols used: ST DIZZINESS-A-OH  Pt reports that she had two episodes of dizziness this morning- one was when she was changing positions in bed and the other was went she was leaning over the tub. Pt denies any symptoms at this time. She [facility] and get [facility] dressed without difficulty. Pt does take a diuretic which she says was just changed.    Per protocol someone from PCP office should contact her today for further instructions

## 2017-05-22 NOTE — TELEPHONE ENCOUNTER
Reason for Disposition   Taking a medicine that could cause dizziness (e.g., blood pressure medications, diuretics)    Protocols used: ST DIZZINESS-A-OH  Pt reports that she had two episodes of dizziness this morning- one was when she was changing positions in bed and the other was went she was leaning over the tub. Pt denies any symptoms at this time. [facility] and [facility] without difficulty. Pt does take a diuretic which she says was just changed.     Per protocol someone from PCP office should contact her today for further instructions

## 2017-05-22 NOTE — TELEPHONE ENCOUNTER
Patient reports that she is feeling better. Denies SOB, chest pain. Speech clear. Verbalizes that she is taking medications as directed. Agrees to take blood pressures daily and to report readings.

## 2017-05-24 ENCOUNTER — TELEPHONE (OUTPATIENT)
Dept: FAMILY MEDICINE | Facility: CLINIC | Age: 82
End: 2017-05-24

## 2017-05-24 RX ORDER — AMLODIPINE BESYLATE 2.5 MG/1
2.5 TABLET ORAL NIGHTLY
Qty: 30 TABLET | Refills: 5 | Status: SHIPPED | OUTPATIENT
Start: 2017-05-24 | End: 2017-06-12

## 2017-05-24 NOTE — TELEPHONE ENCOUNTER
----- Message from Janice Betancourt sent at 5/24/2017  3:37 PM CDT -----  Contact: Self. Call her at  [phone]  Patient spoke with On Call nurse on  5/22 who sent a message to .  Meghan Aden asked her to monitor her b/p and call back. Patient want to speak with Dr. Colbert since she was the one who changed her blood pressure medication. Please call her today.

## 2017-05-24 NOTE — TELEPHONE ENCOUNTER
I spoke with pt about her symptoms - it sounds like she had 2 episodes of vertigo but she has not had any more episodes since. She is alos very distressed aobut her bp which is now running high since we reduced the dose of her labetolol last week. She is wondering if she should increase it back again but I reminded her that her bp was running too low  When she was on the other dose.   I recommend we try addiing a very low dose of amlodipine. She initially refused that - recalling that she took amlodipine in the past and having trouble with it. But she took 10mg in the past and I told her I only want her to take 2.5 mg because it is better at lowering the systolic bp . And she agreed to add the 2.5 mg dose at night time. She will let us know if she has problem - and she is to follow with Dr Sellers next week.

## 2017-05-24 NOTE — TELEPHONE ENCOUNTER
Patient's systolic is running 170-175 and diastolic is running in the 60's. Her medications were recently changed at her visit with Dr. Colbert on 05/17/17. On Monday the patient had dizziness when getting out of bed, and then later in the morning when leaning over the tube. She is concerned that the medication may need to be changed again. She did take an extra 1/2 a tablet of Labetalol this afternoon (originally prescribed by Dr. Camarena), and is now feeling better. Please advise or call the patient.

## 2017-06-07 ENCOUNTER — OFFICE VISIT (OUTPATIENT)
Dept: DERMATOLOGY | Facility: CLINIC | Age: 82
End: 2017-06-07
Payer: MEDICARE

## 2017-06-07 DIAGNOSIS — D48.9 NEOPLASM OF UNCERTAIN BEHAVIOR: Primary | ICD-10-CM

## 2017-06-07 DIAGNOSIS — L90.5 SCAR: ICD-10-CM

## 2017-06-07 DIAGNOSIS — D22.9 NEVUS: ICD-10-CM

## 2017-06-07 DIAGNOSIS — D18.00 ANGIOMA: ICD-10-CM

## 2017-06-07 DIAGNOSIS — Z85.828 PERSONAL HISTORY OF SKIN CANCER: ICD-10-CM

## 2017-06-07 DIAGNOSIS — L82.1 SEBORRHEIC KERATOSES: ICD-10-CM

## 2017-06-07 DIAGNOSIS — L81.4 LENTIGO: ICD-10-CM

## 2017-06-07 DIAGNOSIS — L57.0 AK (ACTINIC KERATOSIS): ICD-10-CM

## 2017-06-07 PROCEDURE — 99213 OFFICE O/P EST LOW 20 MIN: CPT | Mod: PBBFAC,PO | Performed by: DERMATOLOGY

## 2017-06-07 PROCEDURE — 1159F MED LIST DOCD IN RCRD: CPT | Mod: ,,, | Performed by: DERMATOLOGY

## 2017-06-07 PROCEDURE — 17003 DESTRUCT PREMALG LES 2-14: CPT | Mod: PBBFAC,PO | Performed by: DERMATOLOGY

## 2017-06-07 PROCEDURE — 99999 PR PBB SHADOW E&M-EST. PATIENT-LVL III: CPT | Mod: PBBFAC,,, | Performed by: DERMATOLOGY

## 2017-06-07 PROCEDURE — 17000 DESTRUCT PREMALG LESION: CPT | Mod: S$PBB,,, | Performed by: DERMATOLOGY

## 2017-06-07 PROCEDURE — 88305 TISSUE EXAM BY PATHOLOGIST: CPT | Performed by: PATHOLOGY

## 2017-06-07 PROCEDURE — 99214 OFFICE O/P EST MOD 30 MIN: CPT | Mod: 25,S$PBB,, | Performed by: DERMATOLOGY

## 2017-06-07 PROCEDURE — 11100 PR BIOPSY OF SKIN LESION: CPT | Mod: 59,PBBFAC,PO | Performed by: DERMATOLOGY

## 2017-06-07 PROCEDURE — 11100 PR BIOPSY OF SKIN LESION: CPT | Mod: 59,S$PBB,, | Performed by: DERMATOLOGY

## 2017-06-07 PROCEDURE — 17000 DESTRUCT PREMALG LESION: CPT | Mod: PBBFAC,PO | Performed by: DERMATOLOGY

## 2017-06-07 PROCEDURE — 17003 DESTRUCT PREMALG LES 2-14: CPT | Mod: S$PBB,,, | Performed by: DERMATOLOGY

## 2017-06-07 NOTE — PROGRESS NOTES
Subjective:       Patient ID:  Gerda Lai is a 84 y.o. female who presents for   Chief Complaint   Patient presents with    Lesion     left knee     High risk pt with hx NMSC here to check for the development of new lesions.  C/o lesion left knee x 2.5 weeks.  Started with draining pus.  Not painful. Non healing.  tx with A&D ointment.  No change.  Denies trauma but thought it was a bite.       Lesion         Review of Systems   Constitutional: Negative for fever, chills, fatigue and malaise.   Skin: Positive for daily sunscreen use.   Hematologic/Lymphatic: Bruises/bleeds easily.        Objective:    Physical Exam   Constitutional: She appears well-developed and well-nourished. No distress.   Neurological: She is alert and oriented to person, place, and time. She is not disoriented.   Psychiatric: She has a normal mood and affect.   Skin:   Areas Examined (abnormalities noted in diagram):   Scalp / Hair Palpated and Inspected  Head / Face Inspection Performed  Neck Inspection Performed  Chest / Axilla Inspection Performed  Abdomen Inspection Performed  Genitals / Buttocks / Groin Inspection Performed  Back Inspection Performed  RUE Inspected  LUE Inspection Performed  RLE Inspected  LLE Inspection Performed  Nails and Digits Inspection Performed                   Diagram Legend     Erythematous scaling macule/papule c/w actinic keratosis       Vascular papule c/w angioma      Pigmented verrucoid papule/plaque c/w seborrheic keratosis      Yellow umbilicated papule c/w sebaceous hyperplasia      Irregularly shaped tan macule c/w lentigo     1-2 mm smooth white papules consistent with Milia      Movable subcutaneous cyst with punctum c/w epidermal inclusion cyst      Subcutaneous movable cyst c/w pilar cyst      Firm pink to brown papule c/w dermatofibroma      Pedunculated fleshy papule(s) c/w skin tag(s)      Evenly pigmented macule c/w junctional nevus     Mildly variegated pigmented, slightly  irregular-bordered macule c/w mildly atypical nevus      Flesh colored to evenly pigmented papule c/w intradermal nevus       Pink pearly papule/plaque c/w basal cell carcinoma      Erythematous hyperkeratotic cursted plaque c/w SCC      Surgical scar with no sign of skin cancer recurrence      Open and closed comedones      Inflammatory papules and pustules      Verrucoid papule consistent consistent with wart     Erythematous eczematous patches and plaques     Dystrophic onycholytic nail with subungual debris c/w onychomycosis     Umbilicated papule    Erythematous-base heme-crusted tan verrucoid plaque consistent with inflamed seborrheic keratosis     Erythematous Silvery Scaling Plaque c/w Psoriasis     See annotation      Assessment / Plan:      Pathology Orders:      Normal Orders This Visit    Tissue Specimen To Pathology, Dermatology     Questions:    Directional Terms:  Other(comment)    Clinical information:  r/o SCC    Specific Site:  LEFT SUPERIOR KNEE        Neoplasm of uncertain behavior  Area of previous atypical nevus/nevi examined. Site well healed with no signs of recurrence.    Total body skin examination performed today including at least 12 points as noted in physical examination. Lesion/lesions suspicious for atypical nevi noted.  If biopsy positive, schedule procedure for definitive excision.   -     Tissue Specimen To Pathology, Dermatology    AK (actinic keratosis)  Cryosurgery Procedure Note    Verbal consent from the patient is obtained and the patient is aware of the precancerous quality and need for treatment of these lesions. Liquid nitrogen cryosurgery is applied to the 3 actinic keratoses, as detailed in the physical exam, to produce a freeze injury. The patient is aware that blisters may form and is instructed on wound care with gentle cleansing and use of vaseline ointment to keep moist until healed. The patient is supplied a handout on cryosurgery and is instructed to call if  lesions do not completely resolve.    Lentigo  This is a benign hyperpigmented sun induced lesion. Daily sun protection will reduce the number of new lesions. Treatment of these benign lesions are considered cosmetic.  The nature of sun-induced photo-aging and skin cancers is discussed.  Sun avoidance, protective clothing, and the use of 30-SPF sunscreens is advised. Observe closely for skin damage/changes, and call if such occurs.    Seborrheic keratoses  These are benign inherited growths without a malignant potential. Reassurance given to patient. No treatment is necessary.     Nevus  Discussed ABCDE's of nevi.  Monitor for new mole or moles that are becoming bigger, darker, irritated, or developing irregular borders. Brochure provided.    Angioma  These are benign vascular lesions that are inherited.  Treatment is not necessary.    Personal history of skin cancer  Scar  Patient with a history of non melanoma skin cancer.   Total body skin examination performed today including at least 12 points as noted in physical examination. Suspicious lesions noted.             Return for prn bx report.

## 2017-06-12 ENCOUNTER — OFFICE VISIT (OUTPATIENT)
Dept: FAMILY MEDICINE | Facility: CLINIC | Age: 82
End: 2017-06-12
Payer: MEDICARE

## 2017-06-12 ENCOUNTER — OFFICE VISIT (OUTPATIENT)
Dept: OPTOMETRY | Facility: CLINIC | Age: 82
End: 2017-06-12
Payer: MEDICARE

## 2017-06-12 VITALS
HEIGHT: 63 IN | BODY MASS INDEX: 26.29 KG/M2 | SYSTOLIC BLOOD PRESSURE: 130 MMHG | WEIGHT: 148.38 LBS | DIASTOLIC BLOOD PRESSURE: 54 MMHG | TEMPERATURE: 98 F | HEART RATE: 64 BPM

## 2017-06-12 DIAGNOSIS — Z96.1 PSEUDOPHAKIA OF BOTH EYES: Primary | ICD-10-CM

## 2017-06-12 DIAGNOSIS — Z13.5 SCREENING FOR GLAUCOMA: ICD-10-CM

## 2017-06-12 DIAGNOSIS — H04.123 DRY EYES, BILATERAL: ICD-10-CM

## 2017-06-12 DIAGNOSIS — H52.4 PRESBYOPIA OU: ICD-10-CM

## 2017-06-12 DIAGNOSIS — R06.09 DYSPNEA ON EXERTION: Primary | ICD-10-CM

## 2017-06-12 DIAGNOSIS — I10 HYPERTENSION, ESSENTIAL: ICD-10-CM

## 2017-06-12 PROCEDURE — 92015 DETERMINE REFRACTIVE STATE: CPT | Mod: ,,, | Performed by: OPTOMETRIST

## 2017-06-12 PROCEDURE — 99213 OFFICE O/P EST LOW 20 MIN: CPT | Mod: PBBFAC,27,PO | Performed by: FAMILY MEDICINE

## 2017-06-12 PROCEDURE — 92014 COMPRE OPH EXAM EST PT 1/>: CPT | Mod: S$PBB,,, | Performed by: OPTOMETRIST

## 2017-06-12 PROCEDURE — 99999 PR PBB SHADOW E&M-EST. PATIENT-LVL III: CPT | Mod: PBBFAC,,, | Performed by: FAMILY MEDICINE

## 2017-06-12 PROCEDURE — 99999 PR PBB SHADOW E&M-EST. PATIENT-LVL II: CPT | Mod: PBBFAC,,, | Performed by: OPTOMETRIST

## 2017-06-12 PROCEDURE — 99214 OFFICE O/P EST MOD 30 MIN: CPT | Mod: S$PBB,,, | Performed by: FAMILY MEDICINE

## 2017-06-12 PROCEDURE — 1159F MED LIST DOCD IN RCRD: CPT | Mod: ,,, | Performed by: FAMILY MEDICINE

## 2017-06-12 PROCEDURE — 1125F AMNT PAIN NOTED PAIN PRSNT: CPT | Mod: ,,, | Performed by: FAMILY MEDICINE

## 2017-06-12 NOTE — PROGRESS NOTES
HPI     DLS: 7/27/2016  Pt states no noticeable va changes. Pt states 3 weeks ago she noticed one   black floater that came and left. States since then she only saw floaters   once for a second. Denies flashes     AT ou PRN     Last edited by Catherine Helms on 6/12/2017  1:33 PM. (History)        ROS     Positive for: Cardiovascular, Eyes    Negative for: Constitutional, Gastrointestinal, Neurological, Skin,   Genitourinary, Musculoskeletal, HENT, Endocrine, Respiratory, Psychiatric,   Allergic/Imm, Heme/Lymph    Last edited by Luis Daniel Linares, OD on 6/12/2017  1:49 PM. (History)        Assessment /Plan     For exam results, see Encounter Report.    Pseudophakia of both eyes    Dry eyes, bilateral    Screening for glaucoma    Presbyopia OU      1. Mild pco OD sp pciol ou/YAG OS--wrote new spex Rx  2. DAMION/rosacea--pt to cont w ATs       Plan:    rtc 1 yr

## 2017-06-12 NOTE — PROGRESS NOTES
Subjective:       Patient ID: Gerda Lai is a 84 y.o. female.    Chief Complaint: Dizziness (?????? AMLODIPINE) and Ear Fullness (BILATERAL)    Disclaimer: This note has been generated using voice-recognition software. There may be typographical errors that have been missed during proof-reading    85 yo presents today for follow up of hypertension and recurrent dyspnea on exertion.  Pt was seen several weeks ago for hypertension and Amlodipine 2.5mg qhs was added to her medications.  Since that time, she has noted recurrent dizziness which are posturally related.  No history of vertigo  She has also noted recurrent dyspnea on exertion and walking, less than one block.  No associated chest pains.  Pt had previous CT of chest about one year ago showing a small stable pulmonary nodule.  She has a scheduled follow up appt with Heme/Onc department in one month with repeat Chest CT prior to her visit      Dizziness:    Associated symptoms: light-headedness.no weakness and no chest pain.  Ear Fullness    Pertinent negatives include no abdominal pain or coughing.     Review of Systems   Respiratory: Positive for shortness of breath. Negative for cough, chest tightness and wheezing.    Cardiovascular: Negative for chest pain and leg swelling.   Gastrointestinal: Negative for abdominal distention and abdominal pain.   Neurological: Positive for dizziness and light-headedness. Negative for syncope, weakness and numbness.       Objective:      Physical Exam   Constitutional: She appears well-developed and well-nourished. No distress.   Neck: Normal range of motion. No tracheal deviation present. No thyromegaly present.   Cardiovascular: Normal rate, regular rhythm and intact distal pulses.    No murmur heard.  Pulmonary/Chest: Effort normal and breath sounds normal. She has no wheezes. She has no rales.   Abdominal: Soft. Bowel sounds are normal. She exhibits no distension and no mass. There is no tenderness.    Musculoskeletal: She exhibits no edema.   Lymphadenopathy:     She has no cervical adenopathy.       Assessment:     1.  Dyspnea on exertion  2.  Hypertension  3.  Pulmonary nodule      Plan:       1.  Stop Amlodipine  2.  Dobutamine stress echo  3.  F/u one month

## 2017-06-19 ENCOUNTER — HOSPITAL ENCOUNTER (OUTPATIENT)
Dept: CARDIOLOGY | Facility: CLINIC | Age: 82
Discharge: HOME | End: 2017-06-19
Payer: MEDICARE

## 2017-06-19 ENCOUNTER — DOCUMENTATION ONLY (OUTPATIENT)
Dept: CARDIOLOGY | Facility: CLINIC | Age: 82
End: 2017-06-19

## 2017-06-19 DIAGNOSIS — R06.09 DYSPNEA ON EXERTION: ICD-10-CM

## 2017-06-19 LAB
DIASTOLIC DYSFUNCTION: NO
MITRAL VALVE MOBILITY: NORMAL
RETIRED EF AND QEF - SEE NOTES: 55 (ref 55–65)

## 2017-06-19 PROCEDURE — 93351 STRESS TTE COMPLETE: CPT | Mod: 26,S$PBB,, | Performed by: INTERNAL MEDICINE

## 2017-06-19 PROCEDURE — 93321 DOPPLER ECHO F-UP/LMTD STD: CPT | Mod: 26,S$PBB,, | Performed by: INTERNAL MEDICINE

## 2017-06-19 NOTE — PROGRESS NOTES
Patient verified by 2 identifiers and allergies reviewed.  22g IV placed to Lt FA.  DSE explained to patient, consent obtained & testing completed.  Pt tolerated testing well except for C/O Rt shoulder pain 1-2/10 which resolved on own.  IV removed post testing.  Post study discharge instructions reviewed with patient, patient verbalized understanding.  Patient discharged to home in stable condition.

## 2017-07-20 ENCOUNTER — TELEPHONE (OUTPATIENT)
Dept: HEMATOLOGY/ONCOLOGY | Facility: CLINIC | Age: 82
End: 2017-07-20

## 2017-07-20 NOTE — TELEPHONE ENCOUNTER
Returned call, spoke with patient and assisted with scheduling apt since she missed her apt on Monday. New apt made and mailed out

## 2017-07-20 NOTE — TELEPHONE ENCOUNTER
----- Message from Deonna Aparicio sent at 7/20/2017  9:35 AM CDT -----  Patient is calling to set up a follow up visit with the doctor for tomorrow. She can be reached at [phone].

## 2017-07-21 ENCOUNTER — HOSPITAL ENCOUNTER (OUTPATIENT)
Dept: RADIOLOGY | Facility: HOSPITAL | Age: 82
Discharge: HOME | End: 2017-07-21
Attending: INTERNAL MEDICINE
Payer: MEDICARE

## 2017-07-21 VITALS — HEIGHT: 63 IN | BODY MASS INDEX: 26.22 KG/M2 | WEIGHT: 148 LBS

## 2017-07-21 DIAGNOSIS — C50.911 MALIGNANT NEOPLASM OF RIGHT BREAST: ICD-10-CM

## 2017-07-21 PROCEDURE — 77066 DX MAMMO INCL CAD BI: CPT | Mod: TC

## 2017-07-21 PROCEDURE — 77066 DX MAMMO INCL CAD BI: CPT | Mod: 26,,, | Performed by: RADIOLOGY

## 2017-07-24 ENCOUNTER — TELEPHONE (OUTPATIENT)
Dept: ORTHOPEDICS | Facility: CLINIC | Age: 82
End: 2017-07-24

## 2017-07-25 ENCOUNTER — OFFICE VISIT (OUTPATIENT)
Dept: HEMATOLOGY/ONCOLOGY | Facility: CLINIC | Age: 82
End: 2017-07-25
Payer: MEDICARE

## 2017-07-25 DIAGNOSIS — C50.011 MALIGNANT NEOPLASM OF NIPPLE OF RIGHT BREAST IN FEMALE, ESTROGEN RECEPTOR POSITIVE: Primary | ICD-10-CM

## 2017-07-25 DIAGNOSIS — Z17.0 MALIGNANT NEOPLASM OF NIPPLE OF RIGHT BREAST IN FEMALE, ESTROGEN RECEPTOR POSITIVE: Primary | ICD-10-CM

## 2017-07-25 PROCEDURE — 99999 PR PBB SHADOW E&M-EST. PATIENT-LVL II: CPT | Mod: PBBFAC,,, | Performed by: INTERNAL MEDICINE

## 2017-07-25 PROCEDURE — 99213 OFFICE O/P EST LOW 20 MIN: CPT | Mod: S$PBB,,, | Performed by: INTERNAL MEDICINE

## 2017-07-25 PROCEDURE — 99212 OFFICE O/P EST SF 10 MIN: CPT | Mod: PBBFAC | Performed by: INTERNAL MEDICINE

## 2017-07-25 PROCEDURE — 1159F MED LIST DOCD IN RCRD: CPT | Mod: ,,, | Performed by: INTERNAL MEDICINE

## 2017-07-25 NOTE — PROGRESS NOTES
Subjective:       Patient ID: Gerda Lai is a 84 y.o. female.    Chief Complaint: No chief complaint on file.    HPI     Mrs. Lai returns today for follow up with her yearly mammogram, which was read as BIRADS II; a one year follow up was recommended.  Mrs. Lai has a remote history of breast cancer 25 years ago.  At that time she had been diagnosed with cancer of the right breast. She was treated on an NSABP protocol with lumpectomy, radiation therapy, and tamoxifen. She has remained disease free since then.   A mammogram earlier today was read as BIRADS II, and a one year follow up was recommended.  She is scheduled for removal of a skin carcinoma from her left knee tomorrow.    Review of Systems  Overall she is feeling well and she has no complaints.  ECOG PS remains 0.  She denies any anxiety, depression, easy bruising, fevers, chills, night sweats, weight loss, nausea, vomiting, diarrhea, constipation, diplopia, blurred vision, headache, abdominal pain, or difficulty ambulating.     Objective:      Physical Exam  GENERAL: She is alert, oriented to time, place, person, pleasant, well nourished, in no acute physical distress.   VITAL SIGNS: Reviewed.   HEENT: Normal. There are no nasal, oral, lip, gingival, auricular, lid, or conjunctival lesions. Mucosae are moist and pink, and there is no thrush. Pupils are equal, reactive to light and accommodation.   Extraocular muscle movements are intact.   NECK: Supple without JVD, adenopathy, or thyromegaly.   LUNGS: Clear to auscultation without wheezing, rales, or rhonchi.   CARDIOVASCULAR: Reveals an S1, S2, no murmurs, no rubs, no gallops.   BREASTS: A lumpectomy scar is seen at the 9 o'clock position on the right breast and at the 2 o'clock position on the left breast. There are no breast masses in the left breast, while there is dense fibrosis underlying her right sided lumpectomy incision.    LYMPHATIC: There is no cervical, axillary, or supraclavicular  adenopathy.   SKIN: Does not have induration, petechiae, rashes, or ecchymoses.   NEUROLOGIC: Motor function is 5/5, DTRs are 0-1+ bilaterally, symmetrical,   and cranial nerves within normal limits.        Assessment:       1. Malignant neoplasm of nipple of right breast in female, estrogen receptor positive, clinically JEN, doing well.       Plan:        I have asked her to return in a year with another mammogram.  Her questions were answered to her satisfaction.

## 2017-07-26 ENCOUNTER — PROCEDURE VISIT (OUTPATIENT)
Dept: DERMATOLOGY | Facility: CLINIC | Age: 82
End: 2017-07-26
Payer: MEDICARE

## 2017-07-26 DIAGNOSIS — C44.729 SQUAMOUS CELL CARCINOMA OF LEG, LEFT: Primary | ICD-10-CM

## 2017-07-26 PROCEDURE — 12032 INTMD RPR S/A/T/EXT 2.6-7.5: CPT | Mod: PBBFAC,PO | Performed by: DERMATOLOGY

## 2017-07-26 PROCEDURE — 11602 EXC TR-EXT MAL+MARG 1.1-2 CM: CPT | Mod: 51,S$PBB,, | Performed by: DERMATOLOGY

## 2017-07-26 PROCEDURE — 12032 INTMD RPR S/A/T/EXT 2.6-7.5: CPT | Mod: S$PBB,,, | Performed by: DERMATOLOGY

## 2017-07-26 PROCEDURE — 99499 UNLISTED E&M SERVICE: CPT | Mod: S$PBB,,, | Performed by: DERMATOLOGY

## 2017-07-26 PROCEDURE — 88305 TISSUE EXAM BY PATHOLOGIST: CPT | Performed by: PATHOLOGY

## 2017-07-26 PROCEDURE — 11602 EXC TR-EXT MAL+MARG 1.1-2 CM: CPT | Mod: PBBFAC,PO | Performed by: DERMATOLOGY

## 2017-07-26 RX ORDER — MUPIROCIN 20 MG/G
OINTMENT TOPICAL
Qty: 30 G | Refills: 2 | Status: SHIPPED | OUTPATIENT
Start: 2017-07-26 | End: 2017-08-25 | Stop reason: ALTCHOICE

## 2017-07-26 NOTE — PROGRESS NOTES
PROCEDURE: Elliptical excision with intermediate layered repair in order to decrease tension.    ANESTHETIC: 6.0 cc 1% Xylocaine with Epinephrine 1:100,000, buffered    SURGEON: Bouchra Curtis M.D.    ASSISTANTS: Amy De Los Santos LPN    PREOPERATIVE DIAGNOSIS:  Biopsy-proven Squamous Cell Carcinoma    POSTOPERATIVE DIAGNOSIS:  Same as preoperative diagnosis    PATHOLOGIC DIAGNOSIS: Pending    LOCATION: left superior knee    INITIAL LESION SIZE: 1.0 cm    EXCISED DIAMETER: 1.8 cm    PREPARATION: The diagnosis, procedure, alternatives, benefits and risks, including but not limited to: infection, bleeding/bruising, drug reactions, pain, scar or cosmetic defect, local sensation disturbances, wound dehiscence (separation of wound edges after sutures removed) and/or recurrence of present condition were explained to the patient. The patient elected to proceed.  Patient's identity was verified using 2 patient identifiers and the side and site was verified.  Time out period with surgeon, assistant and patient in surgical suite was taken.    PROCEDURE: The location noted above was prepped, draped, and anesthetized in the usual sterile fashion per Amy De Los Santos LPN. Lesional tissue was carefully marked with at least 4 mm margins of clinically normal skin in all directions. A fusiform elliptical excision was done with #15 blade carried down completely through the dermis into the deep subcutaneous tissues to the level of the non-muscle fascia, and dissection was carried out in that plane.  Electrocoagulation was used to obtain hemostasis. Blood loss was minimal. The wound was then approximated in a layered fashion with subcutaneous and intradermal sutures of 4.0 Monocryl, approximately 3 in number, and the wound was then superficially closed with simple interrupted sutures of 4.0 Prolene.    The patient tolerated the procedure well.    The area was cleaned and dressed appropriately and the patient was given wound care  instructions, as well as an appointment for follow-up evaluation.    LENGTH OF REPAIR: 4.0 cm    bactroban ointment for wound care

## 2017-07-31 ENCOUNTER — OFFICE VISIT (OUTPATIENT)
Dept: FAMILY MEDICINE | Facility: CLINIC | Age: 82
End: 2017-07-31
Payer: MEDICARE

## 2017-07-31 ENCOUNTER — TELEPHONE (OUTPATIENT)
Dept: CARDIOLOGY | Facility: CLINIC | Age: 82
End: 2017-07-31

## 2017-07-31 VITALS
SYSTOLIC BLOOD PRESSURE: 160 MMHG | HEIGHT: 63 IN | TEMPERATURE: 98 F | BODY MASS INDEX: 26.01 KG/M2 | HEART RATE: 60 BPM | WEIGHT: 146.81 LBS | DIASTOLIC BLOOD PRESSURE: 60 MMHG

## 2017-07-31 DIAGNOSIS — M54.2 NECK PAIN: Primary | ICD-10-CM

## 2017-07-31 PROCEDURE — 99213 OFFICE O/P EST LOW 20 MIN: CPT | Mod: PBBFAC,PO | Performed by: FAMILY MEDICINE

## 2017-07-31 PROCEDURE — 1125F AMNT PAIN NOTED PAIN PRSNT: CPT | Mod: ,,, | Performed by: FAMILY MEDICINE

## 2017-07-31 PROCEDURE — 99999 PR PBB SHADOW E&M-EST. PATIENT-LVL III: CPT | Mod: PBBFAC,,, | Performed by: FAMILY MEDICINE

## 2017-07-31 PROCEDURE — 1159F MED LIST DOCD IN RCRD: CPT | Mod: ,,, | Performed by: FAMILY MEDICINE

## 2017-07-31 PROCEDURE — 99214 OFFICE O/P EST MOD 30 MIN: CPT | Mod: S$PBB,,, | Performed by: FAMILY MEDICINE

## 2017-07-31 RX ORDER — CHLORZOXAZONE 500 MG/1
500 TABLET ORAL 3 TIMES DAILY
Qty: 30 TABLET | Refills: 2 | Status: SHIPPED | OUTPATIENT
Start: 2017-07-31 | End: 2017-08-10

## 2017-07-31 NOTE — TELEPHONE ENCOUNTER
----- Message from Tess Dallas sent at 7/31/2017  9:03 AM CDT -----  Contact: pt  Pls call pt at [phone].  She says her BP has been running very high lately and she is very concerned.  She gave me a few readings.    7/29/17- her pressure was in the 190's and once was 201/69    7/30/17- 12:00pm 151/50                  4:00pm 177/61                  6:00pm 190/66                10:40pm 143/52 after  labetalol (NORMODYNE) 100 MG tablet     Thanks

## 2017-07-31 NOTE — PROGRESS NOTES
Subjective:       Patient ID: Gerda Lai is a 84 y.o. female.    Chief Complaint: Follow-up (stress test)    Disclaimer: This note has been generated using voice-recognition software. There may be typographical errors that have been missed during proof-reading    85 yo presents today for follow up.  When last seen, Amlodipine was stopped secondary to recurrent dizziness.  The dizziness has improved, but pt has noted increased systolic BPs in 160s range.  She notices improvement of BP by taking one and one half Labetalol on a prn basis.  She denies any chest pain or dyspnea.  Pt had Dobutamine stress echo after last visit, with normal results      Review of Systems   Constitutional: Negative for activity change, fatigue and unexpected weight change.   Respiratory: Negative for chest tightness and shortness of breath.    Cardiovascular: Negative for chest pain, palpitations and leg swelling.   Musculoskeletal: Positive for neck pain and neck stiffness.        1-2 week history of left sided neck pain, no radiation of pain   Neurological: Negative for weakness and numbness.       Objective:      Physical Exam   Constitutional: She appears well-developed and well-nourished. No distress.   Neck: No JVD present.   Tender to palpation over left lateral cervical spine, with palpable muscle spasm over left trapezius   Cardiovascular: Normal rate and regular rhythm.    No murmur heard.  Pulmonary/Chest: Effort normal and breath sounds normal. She has no wheezes. She has no rales.   Musculoskeletal: She exhibits no edema.   Lymphadenopathy:     She has no cervical adenopathy.   Neurological: She has normal reflexes.       Assessment:     1.  Hypertension  2.  Neck pain, history of cervical DJD  3.  Spasm, left trapezius  Plan:       1.  Increase Labetalol to 150mg bid  2.  Parafon Forte tid  3.  F/u 2 weeks

## 2017-08-01 ENCOUNTER — OFFICE VISIT (OUTPATIENT)
Dept: OTOLARYNGOLOGY | Facility: CLINIC | Age: 82
End: 2017-08-01
Payer: MEDICARE

## 2017-08-01 VITALS — BODY MASS INDEX: 25.77 KG/M2 | WEIGHT: 145.5 LBS | SYSTOLIC BLOOD PRESSURE: 118 MMHG | DIASTOLIC BLOOD PRESSURE: 68 MMHG

## 2017-08-01 DIAGNOSIS — Z01.10 ENCOUNTER FOR EXAMINATION OF EARS WITHOUT ABNORMAL FINDINGS: Primary | ICD-10-CM

## 2017-08-01 PROCEDURE — 99999 PR PBB SHADOW E&M-EST. PATIENT-LVL II: CPT | Mod: PBBFAC,,, | Performed by: NURSE PRACTITIONER

## 2017-08-01 PROCEDURE — 99212 OFFICE O/P EST SF 10 MIN: CPT | Mod: S$PBB,,, | Performed by: NURSE PRACTITIONER

## 2017-08-01 PROCEDURE — 99212 OFFICE O/P EST SF 10 MIN: CPT | Mod: PBBFAC | Performed by: NURSE PRACTITIONER

## 2017-08-01 PROCEDURE — 1159F MED LIST DOCD IN RCRD: CPT | Mod: ,,, | Performed by: NURSE PRACTITIONER

## 2017-08-01 PROCEDURE — 1126F AMNT PAIN NOTED NONE PRSNT: CPT | Mod: ,,, | Performed by: NURSE PRACTITIONER

## 2017-08-01 NOTE — PROGRESS NOTES
Subjective:       Patient ID: Gerda Lai is a 84 y.o. female.    Chief Complaint: Cerumen Impaction    HPI   Gerda Lai presents for routine ear cleaning. She has no complaints. She is AAOx, in nad, respirations even/unlabored, denies cp, sob, n/v.    Past Medical History: Patient has a past medical history of Arthritis; Basal cell carcinoma (09/2016); Breast cancer; Cataract; Fibromyalgia; Hyperlipidemia; Hypertension; Personal history of colonic polyps; SCC (squamous cell carcinoma) (2015); SCC (squamous cell carcinoma) (2016); SCC (squamous cell carcinoma) (2017); Squamous cell carcinoma (2015); Thyroid disease; and Vaginitis.    Past Surgical History: Patient has a past surgical history that includes thyriodectomy; Total hip arthroplasty; Mastectomy; tonsillectomy; Adenoidectomy; Cataract extraction w/  intraocular lens implant (Bilateral); and Yag  (Left).    Social History: Patient reports that she has never smoked. She has never used smokeless tobacco. She reports that she drinks alcohol. She reports that she does not use drugs.    Family History: family history includes Breast cancer in her mother; Cancer in her paternal aunt; Glaucoma in her paternal grandmother; Heart disease in her father and paternal aunt; Stroke in her paternal grandfather.    Medications:   Current Outpatient Prescriptions   Medication Sig    acetaminophen (TYLENOL) 650 MG TbSR Take 650 mg by mouth as needed.    ascorbic acid (VITAMIN C) 100 MG tablet Take 100 mg by mouth once daily.    B INFANTIS/B ANI/B JOSE/B BIFID (PROBIOTIC 4X ORAL) Take 1 tablet by mouth daily    chlorthalidone (HYGROTEN) 25 MG Tab Take 25 mg by mouth once daily.    chlorzoxazone (PARAFON FORTE) 500 mg Tab Take 1 tablet (500 mg total) by mouth 3 (three) times daily.    coenzyme Q10 (CO Q-10) 100 mg capsule Take 100 mg by mouth once daily.    fluticasone (FLONASE) 50 mcg/actuation nasal spray USE 1 SPRAY IN EACH NOSTRIL ONCE DAILY (Patient taking  differently: as needed. )    glucosamine-chondroitin 500-400 mg tablet Take 1 tablet by mouth 2 (two) times daily.     labetalol (NORMODYNE) 100 MG tablet Take one and one half tablets twice daily    lorazepam (ATIVAN) 0.5 MG tablet TAKE ONE HALF TABLET 2 TIMES A DAY    multivitamin capsule Take 1 capsule by mouth once daily.    mupirocin (BACTROBAN) 2 % ointment AAA bid    omeprazole (PRILOSEC OTC) 20 MG tablet Take 20 mg by mouth once daily.      pravastatin (PRAVACHOL) 40 MG tablet TAKE 1 TABLET (40 MG TOTAL) BY MOUTH ONCE DAILY.    TEKTURNA 300 mg Tab TAKE 1 TABLET (300 MG TOTAL) BY MOUTH ONCE DAILY.    UNABLE TO FIND once daily. OTC EYE DROP; Systane     No current facility-administered medications for this visit.        Allergies: Patient is allergic to voltaren [diclofenac sodium]; clarithromycin; losartan; flexeril [cyclobenzaprine]; iodine and iodide containing products; lisinopril; tramadol; metoprolol; sulfa (sulfonamide antibiotics); and verapamil (bulk).     Review of Systems   Constitutional: Negative for activity change, appetite change, chills, diaphoresis, fatigue, fever and unexpected weight change.   HENT: Positive for hearing loss. Negative for congestion, dental problem, ear discharge, ear pain, facial swelling, nosebleeds, postnasal drip, rhinorrhea, sinus pressure, sneezing, sore throat, tinnitus, trouble swallowing and voice change.    Eyes: Negative for pain and visual disturbance.   Respiratory: Negative for cough, chest tightness, shortness of breath, wheezing and stridor.    Cardiovascular: Negative for chest pain.   Musculoskeletal: Negative for gait problem and neck pain.   Skin: Negative for color change and rash.   Allergic/Immunologic: Negative for environmental allergies.   Neurological: Negative for dizziness, seizures, syncope, facial asymmetry, speech difficulty, weakness, light-headedness, numbness and headaches.   Psychiatric/Behavioral: Negative for agitation and  confusion. The patient is not nervous/anxious.        Objective:       /68 (BP Location: Right arm, Patient Position: Sitting, BP Method: Automatic)   Wt 66 kg (145 lb 8.1 oz)   BMI 25.77 kg/m²     Physical Exam   Constitutional: She is oriented to person, place, and time. She appears well-developed and well-nourished.   HENT:   Head: Normocephalic and atraumatic. Not macrocephalic and not microcephalic. Head is without raccoon's eyes, without Higgins's sign, without abrasion, without contusion, without laceration, without right periorbital erythema and without left periorbital erythema. Hair is normal.   Right Ear: Tympanic membrane, external ear and ear canal normal. No lacerations. No drainage, swelling or tenderness. No foreign bodies. No mastoid tenderness. Tympanic membrane is not injected, not scarred, not perforated, not erythematous, not retracted and not bulging. Tympanic membrane mobility is normal. No middle ear effusion. No hemotympanum. Decreased hearing is noted.   Left Ear: Tympanic membrane, external ear and ear canal normal. No lacerations. No drainage, swelling or tenderness. No foreign bodies. No mastoid tenderness. Tympanic membrane is not injected, not scarred, not perforated, not erythematous, not retracted and not bulging. Tympanic membrane mobility is normal.  No middle ear effusion. No hemotympanum. Decreased hearing is noted.   Ears:    Nose: Nose normal. No mucosal edema, rhinorrhea, nose lacerations, sinus tenderness, nasal deformity or nasal septal hematoma. No epistaxis.  No foreign bodies.   Mouth/Throat: Uvula is midline, oropharynx is clear and moist and mucous membranes are normal.   Eyes: Conjunctivae, EOM and lids are normal. Pupils are equal, round, and reactive to light.   Neck: Trachea normal and normal range of motion. Neck supple. No spinous process tenderness and no muscular tenderness present. No neck rigidity. No edema, no erythema and normal range of motion  present. No thyroid mass and no thyromegaly present.   Pulmonary/Chest: Effort normal.   Abdominal: Soft.   Musculoskeletal: Normal range of motion.   Lymphadenopathy:        Head (right side): No submental, no submandibular, no tonsillar, no preauricular and no posterior auricular adenopathy present.        Head (left side): No submental, no submandibular, no tonsillar, no preauricular, no posterior auricular and no occipital adenopathy present.     She has no cervical adenopathy.   Neurological: She is alert and oriented to person, place, and time. No cranial nerve deficit or sensory deficit.   Skin: Skin is warm and dry.   Psychiatric: She has a normal mood and affect. Her behavior is normal. Judgment and thought content normal.   Nursing note and vitals reviewed.      Assessment:       1. Encounter for examination of ears without abnormal findings        Plan:       RTC in 1 year for routine ear cleaning.

## 2017-08-02 NOTE — PROGRESS NOTES
Post- operative squamous   cell carcinoma- margins clear    FINAL PATHOLOGIC DIAGNOSIS  1. Skin, left superior knee, excision:  - RESIDUAL INVASIVE SQUAMOUS CELL CARCINOMA.  - MARGINS ARE NEGATIVE.  - DERMAL SCAR.    F/u 6 months for skin check

## 2017-08-03 ENCOUNTER — OFFICE VISIT (OUTPATIENT)
Dept: ORTHOPEDICS | Facility: CLINIC | Age: 82
End: 2017-08-03
Payer: MEDICARE

## 2017-08-03 ENCOUNTER — PATIENT MESSAGE (OUTPATIENT)
Dept: DERMATOLOGY | Facility: CLINIC | Age: 82
End: 2017-08-03

## 2017-08-03 VITALS — HEIGHT: 63 IN | WEIGHT: 146.25 LBS | BODY MASS INDEX: 25.91 KG/M2

## 2017-08-03 DIAGNOSIS — M70.62 TROCHANTERIC BURSITIS OF LEFT HIP: Primary | ICD-10-CM

## 2017-08-03 PROCEDURE — 3008F BODY MASS INDEX DOCD: CPT | Mod: ,,, | Performed by: PHYSICIAN ASSISTANT

## 2017-08-03 PROCEDURE — 20610 DRAIN/INJ JOINT/BURSA W/O US: CPT | Mod: S$PBB,LT,, | Performed by: PHYSICIAN ASSISTANT

## 2017-08-03 PROCEDURE — 20610 DRAIN/INJ JOINT/BURSA W/O US: CPT | Mod: PBBFAC | Performed by: PHYSICIAN ASSISTANT

## 2017-08-03 PROCEDURE — 99213 OFFICE O/P EST LOW 20 MIN: CPT | Mod: 25,S$PBB,, | Performed by: PHYSICIAN ASSISTANT

## 2017-08-03 PROCEDURE — 99999 PR PBB SHADOW E&M-EST. PATIENT-LVL III: CPT | Mod: PBBFAC,,, | Performed by: PHYSICIAN ASSISTANT

## 2017-08-03 PROCEDURE — 1159F MED LIST DOCD IN RCRD: CPT | Mod: ,,, | Performed by: PHYSICIAN ASSISTANT

## 2017-08-03 PROCEDURE — 1125F AMNT PAIN NOTED PAIN PRSNT: CPT | Mod: ,,, | Performed by: PHYSICIAN ASSISTANT

## 2017-08-03 PROCEDURE — 99213 OFFICE O/P EST LOW 20 MIN: CPT | Mod: PBBFAC | Performed by: PHYSICIAN ASSISTANT

## 2017-08-03 RX ORDER — METHYLPREDNISOLONE ACETATE 80 MG/ML
80 INJECTION, SUSPENSION INTRA-ARTICULAR; INTRALESIONAL; INTRAMUSCULAR; SOFT TISSUE
Status: COMPLETED | OUTPATIENT
Start: 2017-08-03 | End: 2017-08-03

## 2017-08-03 RX ADMIN — METHYLPREDNISOLONE ACETATE 80 MG: 80 INJECTION, SUSPENSION INTRALESIONAL; INTRAMUSCULAR; INTRASYNOVIAL; SOFT TISSUE at 02:08

## 2017-08-03 NOTE — PROGRESS NOTES
Subjective:      Patient ID: Gerda Lai is a 84 y.o. female.    Chief Complaint: No chief complaint on file.    HPI  Patient returns to clinic with chief complaint of left hip pain. She has a h/o trochanteric bursitis. She has received injections in the past. Last injection was in May. It provided relief until a couple of weeks ago. She denies trauma. Pain is at the lateral hip and it radiates down her leg. She also reports groin pain. Pain is worse with walking. Tylenol provides some relief. She did PT and says it helped. She continues HEP. She does not use assistive devices. She had right ELZBIETA in 2000 by Dr. Antoine.   Review of Systems   Constitution: Negative for chills, fever and night sweats.   Cardiovascular: Negative for chest pain.   Respiratory: Negative for cough and shortness of breath.    Hematologic/Lymphatic: Does not bruise/bleed easily.   Skin: Negative for color change.   Gastrointestinal: Negative for heartburn.   Genitourinary: Negative for dysuria.   Neurological: Negative for numbness and paresthesias.   Psychiatric/Behavioral: Negative for altered mental status.   Allergic/Immunologic: Negative for persistent infections.         Objective:            General    Vitals reviewed.  Constitutional: She is oriented to person, place, and time. She appears well-developed and well-nourished.   Cardiovascular: Normal rate.    Neurological: She is alert and oriented to person, place, and time.         Left Hip Exam     Tenderness   The patient tender to palpation of the trochanteric bursa.    Range of Motion   The patient has normal left hip ROM.    Muscle Strength   The patient has normal left hip strength.     Tests   Stinchfield test: negative  Log Roll: negative    Other   Sensation: normal    Comments:  No pain with hip ROM.           Vascular Exam       Left Pulses  Dorsalis Pedis:      2+                X-ray: reviewed by myself. Mild OA left hip.       Assessment:       Encounter Diagnosis    Name Primary?    Trochanteric bursitis of left hip Yes          Plan:       Discussed treatment options with patient. She would like another injection today for the hip bursa. Continue HEP. RTC prn.     PROCEDURE:  I have explained the risks, benefits, and alternatives of the procedure in detail.  The patient voices understanding and all questions have been answered.  The patient agrees to proceed as planned. So after I performed a sterile pre of the skin in the normal fashion the left greater trochanteric area is injected from the lateral approach using an 2 inch 22 gauge needle with a combination of 4cc 1% lidocaine and 80 mg of depo medrol.  The patient is cautioned and immediate relief of pain is secondary to the local anesthetic and will be temporary.  After the anesthetic wears off there may be a increase in pain that may last for a few hours or a few days and they should use ice to help alleviate this flair up of pain.

## 2017-08-08 NOTE — TELEPHONE ENCOUNTER
Called pt to obtain updated BP readings. Pt states she has been feeling better so she has not been checking BP. Pt states she will begin to monitor BP and call our office on 8/11 with updated readings.

## 2017-08-11 RX ORDER — AMLODIPINE BESYLATE 2.5 MG/1
2.5 TABLET ORAL DAILY
Qty: 30 TABLET | Refills: 11 | Status: SHIPPED | OUTPATIENT
Start: 2017-08-11 | End: 2017-09-14 | Stop reason: SDUPTHER

## 2017-08-11 NOTE — TELEPHONE ENCOUNTER
Pt calling to report updated BP readings as advised by . BP readings reported as follows:  8/07 4:15pm 154/55  8/08 3:00pm 170/60-took 1/2 tablet of labetalol          9:15pm 172/59  8/09 11:10pm 144/56  8/10 3:40am 172/57-took 1/2 tablet of labetalol          9:45am 140/57  8/11 3:00pm 135/56   Pt states she takes labetalol 100mg 1.5 tablets twice daily and was told she can take extra labetalol 100mg 1/2 tablet PRN  or greater.Please advise.

## 2017-08-11 NOTE — TELEPHONE ENCOUNTER
Add low dose amlodipine 2.5 mg at night.  I did not want to increase the dose of labetalol because of sinsu bradycardia

## 2017-08-15 ENCOUNTER — OFFICE VISIT (OUTPATIENT)
Dept: FAMILY MEDICINE | Facility: CLINIC | Age: 82
End: 2017-08-15
Payer: MEDICARE

## 2017-08-15 ENCOUNTER — LAB VISIT (OUTPATIENT)
Dept: LAB | Facility: HOSPITAL | Age: 82
End: 2017-08-15
Attending: FAMILY MEDICINE
Payer: MEDICARE

## 2017-08-15 VITALS
SYSTOLIC BLOOD PRESSURE: 110 MMHG | DIASTOLIC BLOOD PRESSURE: 50 MMHG | TEMPERATURE: 97 F | HEIGHT: 63 IN | WEIGHT: 146.81 LBS | BODY MASS INDEX: 26.01 KG/M2 | HEART RATE: 60 BPM

## 2017-08-15 DIAGNOSIS — D64.9 ANEMIA, UNSPECIFIED TYPE: ICD-10-CM

## 2017-08-15 DIAGNOSIS — I10 ESSENTIAL HYPERTENSION: ICD-10-CM

## 2017-08-15 DIAGNOSIS — D64.9 ANEMIA, UNSPECIFIED TYPE: Primary | ICD-10-CM

## 2017-08-15 LAB
ANION GAP SERPL CALC-SCNC: 10 MMOL/L
BASOPHILS # BLD AUTO: 0.02 K/UL
BASOPHILS NFR BLD: 0.2 %
BUN SERPL-MCNC: 27 MG/DL
CALCIUM SERPL-MCNC: 9.2 MG/DL
CHLORIDE SERPL-SCNC: 108 MMOL/L
CO2 SERPL-SCNC: 24 MMOL/L
CREAT SERPL-MCNC: 1.2 MG/DL
DIFFERENTIAL METHOD: ABNORMAL
EOSINOPHIL # BLD AUTO: 0.1 K/UL
EOSINOPHIL NFR BLD: 1.6 %
ERYTHROCYTE [DISTWIDTH] IN BLOOD BY AUTOMATED COUNT: 12.2 %
EST. GFR  (AFRICAN AMERICAN): 48 ML/MIN/1.73 M^2
EST. GFR  (NON AFRICAN AMERICAN): 41.6 ML/MIN/1.73 M^2
GLUCOSE SERPL-MCNC: 108 MG/DL
HCT VFR BLD AUTO: 36.1 %
HGB BLD-MCNC: 12.2 G/DL
LYMPHOCYTES # BLD AUTO: 2.4 K/UL
LYMPHOCYTES NFR BLD: 29.2 %
MCH RBC QN AUTO: 33.2 PG
MCHC RBC AUTO-ENTMCNC: 33.8 G/DL
MCV RBC AUTO: 98 FL
MONOCYTES # BLD AUTO: 0.7 K/UL
MONOCYTES NFR BLD: 8.8 %
NEUTROPHILS # BLD AUTO: 4.9 K/UL
NEUTROPHILS NFR BLD: 60 %
PLATELET # BLD AUTO: 191 K/UL
PMV BLD AUTO: 9.2 FL
POTASSIUM SERPL-SCNC: 4 MMOL/L
RBC # BLD AUTO: 3.68 M/UL
SODIUM SERPL-SCNC: 142 MMOL/L
WBC # BLD AUTO: 8.21 K/UL

## 2017-08-15 PROCEDURE — 99999 PR PBB SHADOW E&M-EST. PATIENT-LVL III: CPT | Mod: PBBFAC,,, | Performed by: FAMILY MEDICINE

## 2017-08-15 PROCEDURE — 99214 OFFICE O/P EST MOD 30 MIN: CPT | Mod: S$PBB,,, | Performed by: FAMILY MEDICINE

## 2017-08-15 PROCEDURE — 1159F MED LIST DOCD IN RCRD: CPT | Mod: ,,, | Performed by: FAMILY MEDICINE

## 2017-08-15 PROCEDURE — 85025 COMPLETE CBC W/AUTO DIFF WBC: CPT

## 2017-08-15 PROCEDURE — 3074F SYST BP LT 130 MM HG: CPT | Mod: ,,, | Performed by: FAMILY MEDICINE

## 2017-08-15 PROCEDURE — 36415 COLL VENOUS BLD VENIPUNCTURE: CPT | Mod: PO

## 2017-08-15 PROCEDURE — 3078F DIAST BP <80 MM HG: CPT | Mod: ,,, | Performed by: FAMILY MEDICINE

## 2017-08-15 PROCEDURE — 3008F BODY MASS INDEX DOCD: CPT | Mod: ,,, | Performed by: FAMILY MEDICINE

## 2017-08-15 PROCEDURE — 80048 BASIC METABOLIC PNL TOTAL CA: CPT

## 2017-08-15 PROCEDURE — 99213 OFFICE O/P EST LOW 20 MIN: CPT | Mod: PBBFAC,PO | Performed by: FAMILY MEDICINE

## 2017-08-15 NOTE — PROGRESS NOTES
Subjective:       Patient ID: Gerda Lai is a 84 y.o. female.    Chief Complaint: Follow-up    Disclaimer: This note has been generated using voice-recognition software. There may be typographical errors that have been missed during proof-reading    Pt is 83 yo who presents for follow up of hypertension.  No side effects from medication, but has noted some occasional postural dizziness without syncope.  These symptoms seem to occur mostly in am, after taking Chlorthalidone.        Review of Systems   Constitutional: Negative for activity change, fatigue and unexpected weight change.   Respiratory: Negative for chest tightness and shortness of breath.    Cardiovascular: Negative for chest pain, palpitations and leg swelling.   Neurological: Positive for light-headedness. Negative for dizziness, syncope, weakness and headaches.       Objective:      Physical Exam   Constitutional: She appears well-developed and well-nourished. No distress.   Neck: Normal range of motion. No JVD present. No thyromegaly present.   Cardiovascular: Normal rate and regular rhythm.    Pulse 60 regular  /60 without postural change   Pulmonary/Chest: Effort normal and breath sounds normal. She has no wheezes. She has no rales.   Musculoskeletal: She exhibits no edema.   Neurological: She is alert. She has normal reflexes.       Assessment:       1.  Essential hypertension  2.  Prior history of anemia  Plan:       1.  CBC, BMP  2.  Decrease Chlorthalidone to 12.5mg daily  3.  F/u 3-4 weeks

## 2017-08-16 ENCOUNTER — TELEPHONE (OUTPATIENT)
Dept: FAMILY MEDICINE | Facility: CLINIC | Age: 82
End: 2017-08-16

## 2017-08-17 RX ORDER — CHLORTHALIDONE 25 MG/1
25 TABLET ORAL DAILY
Qty: 30 TABLET | Refills: 11 | Status: SHIPPED | OUTPATIENT
Start: 2017-08-17 | End: 2017-08-25 | Stop reason: SDUPTHER

## 2017-08-21 ENCOUNTER — HOSPITAL ENCOUNTER (EMERGENCY)
Facility: HOSPITAL | Age: 82
Discharge: HOME | End: 2017-08-21
Attending: EMERGENCY MEDICINE
Payer: MEDICARE

## 2017-08-21 VITALS
OXYGEN SATURATION: 99 % | DIASTOLIC BLOOD PRESSURE: 87 MMHG | BODY MASS INDEX: 26.05 KG/M2 | RESPIRATION RATE: 18 BRPM | HEIGHT: 63 IN | TEMPERATURE: 99 F | SYSTOLIC BLOOD PRESSURE: 210 MMHG | WEIGHT: 147 LBS | HEART RATE: 55 BPM

## 2017-08-21 DIAGNOSIS — M25.511 ACUTE PAIN OF RIGHT SHOULDER: ICD-10-CM

## 2017-08-21 DIAGNOSIS — R52 PAIN: ICD-10-CM
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
## 2017-08-21 PROCEDURE — 99282 EMERGENCY DEPT VISIT SF MDM: CPT | Mod: ,,, | Performed by: EMERGENCY MEDICINE

## 2017-08-21 PROCEDURE — 99283 EMERGENCY DEPT VISIT LOW MDM: CPT

## 2017-08-21 NOTE — ED PROVIDER NOTES
Encounter Date: 8/21/2017    SCRIBE #1 NOTE: I, Vickie Nguyen, am scribing for, and in the presence of, Dr. Reddy.       History     Chief Complaint   Patient presents with    Fall     Patient reports that she slipped on a wet floor today, with pain to right shoulder since.  Prior pain to same area, but worse now.       The patient is a 84 y.o. female with hx of:HTN, fibromyalgia, HLD, arthritis, thyroid disease, breast CA s/p mastectomy, and skin CA that presents to the ED for evaluation of right shoulder and elbow pain s/p fall. Patient walked into the foyer, the floor was wet and she slipped and fell backwards hitting her buttocks,elbow and shoulder first. Denies hitting her head. She is not on blood thinners. Patient received her tetanus shot in 2015. No further concerns or complaints at this time.      The history is provided by the patient and medical records.     Review of patient's allergies indicates:   Allergen Reactions    Voltaren [diclofenac sodium] Other (See Comments)     Drops blood pressure    Clarithromycin Other (See Comments)     Weak, extreme fatigue. dizziness    Losartan Rash    Flexeril [cyclobenzaprine] Other (See Comments)     Dizziness    Iodine and iodide containing products     Lisinopril Other (See Comments)     Cough and sensation of throat swelling/?angioedema    Tramadol      Dizzy and weak    Metoprolol Swelling     Tightness in throat    Sulfa (sulfonamide antibiotics) Rash    Verapamil (bulk) Palpitations     Past Medical History:   Diagnosis Date    Arthritis     Basal cell carcinoma 09/2016    right post auricular neck     Breast cancer     Cataract     Fibromyalgia     Hyperlipidemia     Hypertension     Personal history of colonic polyps     SCC (squamous cell carcinoma) 2015    R chest    SCC (squamous cell carcinoma) 2016    left medial shoulder    SCC (squamous cell carcinoma) 2017    left knee    Squamous cell carcinoma 2015    right forearm     Thyroid disease     Vaginitis      Past Surgical History:   Procedure Laterality Date    ADENOIDECTOMY      CATARACT EXTRACTION W/  INTRAOCULAR LENS IMPLANT Bilateral     MASTECTOMY      partial right    thyriodectomy      partial    tonsillectomy      TOTAL HIP ARTHROPLASTY      right    Yag  Left      Family History   Problem Relation Age of Onset    Breast cancer Mother     Stroke Paternal Grandfather     Heart disease Father     Cancer Paternal Aunt     Heart disease Paternal Aunt     Glaucoma Paternal Grandmother     Amblyopia Neg Hx     Blindness Neg Hx     Cataracts Neg Hx     Diabetes Neg Hx     Hypertension Neg Hx     Macular degeneration Neg Hx     Retinal detachment Neg Hx     Strabismus Neg Hx     Thyroid disease Neg Hx     Melanoma Neg Hx      Social History   Substance Use Topics    Smoking status: Never Smoker    Smokeless tobacco: Never Used    Alcohol use 0.0 oz/week     2 - 3 Glasses of wine per week      Comment: socially     Review of Systems   Constitutional: Negative for fever.   HENT: Negative for sore throat.    Respiratory: Negative for shortness of breath.    Cardiovascular: Negative for chest pain.   Gastrointestinal: Negative for abdominal pain.   Genitourinary: Negative for flank pain.   Musculoskeletal: Negative for neck pain.        (+) Right shoulder pain  (+)Right elbow pain   Skin: Negative for rash.   Neurological: Negative for headaches.   Psychiatric/Behavioral: Negative for confusion.       Physical Exam     Initial Vitals   BP Pulse Resp Temp SpO2   08/21/17 1612 08/21/17 1611 08/21/17 1611 08/21/17 1611 08/21/17 1611   (!) 203/81 63 16 98.7 °F (37.1 °C) 98 %      MAP       08/21/17 1612       121.67         Physical Exam    Nursing note and vitals reviewed.  Constitutional: She appears well-developed and well-nourished.   HENT:   Head: Normocephalic and atraumatic.   Eyes: Conjunctivae are normal.   Neck: Normal range of motion.   Cardiovascular:  Normal rate, regular rhythm and normal heart sounds.   Pulmonary/Chest: No respiratory distress. She has no wheezes. She has no rales. She exhibits no tenderness.   No chest wall tenderness.   Abdominal: Soft. There is no tenderness.   Musculoskeletal: She exhibits no edema.   Right shoulder: some tenderness on palpation of humeral head but normal ROM.   Elbow with normal ROM and no tenderness on palpation.  Normal strength sensation of UE. All other bones and joints palpated and put through ROM without any tenderness, deformity or limitation.    Neurological: She is alert and oriented to person, place, and time. Gait normal.   Skin: No rash noted.   Elbow abrasion on the olecranon.         ED Course   Procedures  Labs Reviewed - No data to display       X-Rays:   Independently Interpreted Readings:   Other Readings:  XR right shoulder- No evidence of fracture but evidence of rotator cuff injury.    Medical Decision Making:   History:   Old Medical Records: I decided to obtain old medical records.       <> Summary of Records: Patient with hx of HTN, fibromyalgia, HLD, arthritis, thyroid disease, breast CA s/p mastectomy, and skin CA. Followed by ortho for trochanteric bursitis. No recent ED visits.  Initial Assessment:   Patient with mechanical fall c/o of right shoulder and elbow pain. On exam, there is some tenderness to humeral head although exam is unremarkable. Pt denies head injury or LOC. The rest of the trauma exam is negative. Tdap in 2015. Will do shoulder XR and reassess.  Independently Interpreted Test(s):   I have ordered and independently interpreted X-rays - see prior notes.  Clinical Tests:   Radiological Study: Ordered and Reviewed            Scribe Attestation:   Scribe #1: I performed the above scribed service and the documentation accurately describes the services I performed. I attest to the accuracy of the note.    Attending Attestation:           Physician Attestation for Scribe:  Physician  Attestation Statement for Scribe #1: I, Dr. Reddy, reviewed documentation, as scribed by Vickie Nguyen in my presence, and it is both accurate and complete.         Attending ED Notes:   7:15 PM-XR shows no evidence of fracture but evidence of rotator cuff injury which pt has had remotely. I advised patient to take tylenol over-the-counter and follow-up with PCP. Return instructions given.          ED Course     Clinical Impression:   The primary encounter diagnosis was Fall, initial encounter. Diagnoses of Pain, Fall, and Acute pain of right shoulder were also pertinent to this visit.    Disposition:   Disposition: Discharged  Condition: Stable                        Amy Reddy MD  08/24/17 5968

## 2017-08-21 NOTE — ED TRIAGE NOTES
Slipped on a tile floor around 3pm today.  Contusion noted to right elbow and patient is complaining of pain to her right shoulder.    GENERAL: The patient is frail elderly female in no apparent distress. Alert and oriented x4.                                                HEENT: Head is normocephalic and atraumatic. Extraocular muscles are intact. Pupils are equal, round, and reactive to light and accommodation. Nares appeared normal. Mouth is well hydrated and without lesions. Mucous membranes are moist. Posterior pharynx clear of any exudate or lesions.    NECK: Supple. No carotid bruits. No lymphadenopathy or thyromegaly.    LUNGS: Clear to auscultation.    HEART: Regular rate and rhythm without murmur.     ABDOMEN: Soft, nontender, and nondistended. Positive bowel sounds. No hepatosplenomegaly was noted.     EXTREMITIES: Without any cyanosis, clubbing, rash, lesions or edema.     NEUROLOGIC: Cranial nerves II through XII are grossly intact.     PSYCHIATRIC: Flat affect, but denies suicidal or homicidal ideations.    SKIN: No ulceration or induration present.

## 2017-08-22 ENCOUNTER — TELEPHONE (OUTPATIENT)
Dept: CARDIOLOGY | Facility: CLINIC | Age: 82
End: 2017-08-22

## 2017-08-22 NOTE — TELEPHONE ENCOUNTER
Pt calling states she was in ER yesterday for a fall. Pt states BP was elevated with SBP of 200 while in ER. Pt states /48 this AM.  notified, advised pt continue current medications. Pt notified, verbalized understanding.

## 2017-08-25 ENCOUNTER — OFFICE VISIT (OUTPATIENT)
Dept: FAMILY MEDICINE | Facility: CLINIC | Age: 82
End: 2017-08-25
Payer: MEDICARE

## 2017-08-25 VITALS
SYSTOLIC BLOOD PRESSURE: 140 MMHG | HEIGHT: 63 IN | TEMPERATURE: 98 F | BODY MASS INDEX: 26.17 KG/M2 | DIASTOLIC BLOOD PRESSURE: 68 MMHG | WEIGHT: 147.69 LBS | HEART RATE: 60 BPM

## 2017-08-25 DIAGNOSIS — E78.00 PURE HYPERCHOLESTEROLEMIA: Primary | ICD-10-CM

## 2017-08-25 DIAGNOSIS — I10 ESSENTIAL HYPERTENSION: ICD-10-CM

## 2017-08-25 PROCEDURE — 3078F DIAST BP <80 MM HG: CPT | Mod: ,,, | Performed by: FAMILY MEDICINE

## 2017-08-25 PROCEDURE — 99999 PR PBB SHADOW E&M-EST. PATIENT-LVL III: CPT | Mod: PBBFAC,,, | Performed by: FAMILY MEDICINE

## 2017-08-25 PROCEDURE — 99213 OFFICE O/P EST LOW 20 MIN: CPT | Mod: S$PBB,,, | Performed by: FAMILY MEDICINE

## 2017-08-25 PROCEDURE — 3074F SYST BP LT 130 MM HG: CPT | Mod: ,,, | Performed by: FAMILY MEDICINE

## 2017-08-25 PROCEDURE — 99213 OFFICE O/P EST LOW 20 MIN: CPT | Mod: PBBFAC,PO | Performed by: FAMILY MEDICINE

## 2017-08-25 PROCEDURE — 1126F AMNT PAIN NOTED NONE PRSNT: CPT | Mod: ,,, | Performed by: FAMILY MEDICINE

## 2017-08-25 PROCEDURE — 1159F MED LIST DOCD IN RCRD: CPT | Mod: ,,, | Performed by: FAMILY MEDICINE

## 2017-08-25 NOTE — PROGRESS NOTES
Subjective:     Patient ID: Gerda Lai is a 84 y.o. female.    Chief Complaint: Hypertension (blood pressure follow up)    HPI shes been checking her BP at home and its been running low. She slipped on wet tile at home on Monday and went to ed for shoulder pain- Her BP was very high when she was there.   Then Tuesday /48 then wed 141/54 Thursday 8am 131/48 (she took a lorazepam -she takes this every am) then at 10 am  103/45, yesterday she didn't take her chlorthalidone. But she took her amlodipine in pm.   This am 150/54 this am.   Review of Systems  per HPI  Objective:      Physical Exam   Constitutional: She is oriented to person, place, and time. She appears well-developed and well-nourished.   HENT:   Head: Normocephalic and atraumatic.   Right Ear: External ear normal.   Left Ear: External ear normal.   Nose: Nose normal.   Mouth/Throat: Oropharynx is clear and moist.   Eyes: Conjunctivae and EOM are normal. Pupils are equal, round, and reactive to light.   Neck: No JVD present. No thyromegaly present.   Cardiovascular: Normal rate, regular rhythm, normal heart sounds and intact distal pulses.    No murmur heard.  Pulmonary/Chest: Effort normal and breath sounds normal.   Abdominal: She exhibits no mass. There is no tenderness.   Lymphadenopathy:     She has no cervical adenopathy.   Neurological: She is alert and oriented to person, place, and time. She has normal reflexes.   Skin: Skin is warm and dry.   Psychiatric: She has a normal mood and affect. Her behavior is normal. Judgment and thought content normal.   Vitals reviewed.      Assessment:     Gerda was seen today for hypertension.    Diagnoses and all orders for this visit:    Pure hypercholesterolemia  Continuing current management.  Essential hypertension   Discontinue: aliskiren (TEKTURNA) 150 MG Tab; Take 1 tablet (150 mg total) by mouth once daily.  Follow up in 2 -3 weeks with Dr Area for recheck BP

## 2017-08-27 RX ORDER — ALISKIREN 150 MG/1
150 TABLET, FILM COATED ORAL DAILY
Qty: 30 TABLET | Refills: 5
Start: 2017-08-27 | End: 2017-09-14 | Stop reason: SDUPTHER

## 2017-09-12 RX ORDER — LORAZEPAM 0.5 MG/1
TABLET ORAL
Qty: 60 TABLET | Refills: 1 | Status: SHIPPED | OUTPATIENT
Start: 2017-09-12 | End: 2018-01-21 | Stop reason: SDUPTHER

## 2017-09-14 ENCOUNTER — OFFICE VISIT (OUTPATIENT)
Dept: FAMILY MEDICINE | Facility: CLINIC | Age: 82
End: 2017-09-14
Payer: MEDICARE

## 2017-09-14 VITALS
SYSTOLIC BLOOD PRESSURE: 150 MMHG | HEART RATE: 56 BPM | HEIGHT: 63 IN | TEMPERATURE: 98 F | WEIGHT: 147.69 LBS | BODY MASS INDEX: 26.17 KG/M2 | DIASTOLIC BLOOD PRESSURE: 70 MMHG

## 2017-09-14 DIAGNOSIS — I10 HYPERTENSION, ESSENTIAL: Primary | ICD-10-CM

## 2017-09-14 PROCEDURE — 99214 OFFICE O/P EST MOD 30 MIN: CPT | Mod: S$PBB,,, | Performed by: FAMILY MEDICINE

## 2017-09-14 PROCEDURE — 99213 OFFICE O/P EST LOW 20 MIN: CPT | Mod: PBBFAC,PO | Performed by: FAMILY MEDICINE

## 2017-09-14 PROCEDURE — 3078F DIAST BP <80 MM HG: CPT | Mod: ,,, | Performed by: FAMILY MEDICINE

## 2017-09-14 PROCEDURE — 99999 PR PBB SHADOW E&M-EST. PATIENT-LVL III: CPT | Mod: PBBFAC,,, | Performed by: FAMILY MEDICINE

## 2017-09-14 PROCEDURE — 3077F SYST BP >= 140 MM HG: CPT | Mod: ,,, | Performed by: FAMILY MEDICINE

## 2017-09-14 PROCEDURE — 1159F MED LIST DOCD IN RCRD: CPT | Mod: ,,, | Performed by: FAMILY MEDICINE

## 2017-09-14 PROCEDURE — 1125F AMNT PAIN NOTED PAIN PRSNT: CPT | Mod: ,,, | Performed by: FAMILY MEDICINE

## 2017-09-14 RX ORDER — ALISKIREN 300 MG/1
300 TABLET, FILM COATED ORAL DAILY
Qty: 30 TABLET | Refills: 5
Start: 2017-09-14 | End: 2017-10-17 | Stop reason: SDUPTHER

## 2017-09-14 RX ORDER — AMLODIPINE BESYLATE 2.5 MG/1
2.5 TABLET ORAL DAILY
Qty: 30 TABLET | Refills: 11 | Status: SHIPPED | OUTPATIENT
Start: 2017-09-14 | End: 2018-02-21

## 2017-09-14 RX ORDER — AMLODIPINE BESYLATE 5 MG/1
5 TABLET ORAL DAILY
Qty: 30 TABLET | Refills: 11 | Status: SHIPPED | OUTPATIENT
Start: 2017-09-14 | End: 2017-09-14 | Stop reason: SDUPTHER

## 2017-09-14 NOTE — PATIENT INSTRUCTIONS
Take your Chlorthalidone, Tekturna and Labetalol in the morning  Take Amlodipine and Labetalol in the evening

## 2017-09-14 NOTE — PROGRESS NOTES
Subjective:       Patient ID: Gerda Lai is a 84 y.o. female.    Chief Complaint: Hypertension (follow up)    Disclaimer: This note has been generated using voice-recognition software. There may be typographical errors that have been missed during proof-reading    85 yo presents today for follow up of hypertension.  BP monitoring at home has shown systolics up to 190s range since Tekturna decreased to 150mg.  She has noted some occasional headaches.  BP elevation seems to occur mostly in am      Hypertension   Pertinent negatives include no chest pain, palpitations or shortness of breath.     Review of Systems   Constitutional: Negative for activity change, fatigue and unexpected weight change.   Respiratory: Negative for cough, chest tightness, shortness of breath and wheezing.    Cardiovascular: Negative for chest pain, palpitations and leg swelling.   Neurological: Negative for dizziness, speech difficulty, weakness and light-headedness.       Objective:      Physical Exam   Constitutional: She is oriented to person, place, and time. She appears well-developed and well-nourished. No distress.   Neck: Normal range of motion. Neck supple. No JVD present.   Cardiovascular: Normal rate and regular rhythm.    No murmur heard.  Pulmonary/Chest: Effort normal and breath sounds normal. She has no wheezes. She has no rales.   Musculoskeletal: She exhibits no edema.   Lymphadenopathy:     She has no cervical adenopathy.   Neurological: She is alert and oriented to person, place, and time. She displays normal reflexes. No cranial nerve deficit.       Assessment:       1. Hypertension, essential        Plan:         1.  Increase Tekturna to 300mg daily  2.  Take Amlodipine with Labetalol in pm  3.  All other medications in am  4.  Start taking Lorazepam bid  5.  F/u one month

## 2017-10-14 DIAGNOSIS — I10 ESSENTIAL HYPERTENSION: ICD-10-CM

## 2017-10-14 RX ORDER — LABETALOL 100 MG/1
TABLET, FILM COATED ORAL
Qty: 135 TABLET | Refills: 2 | Status: SHIPPED | OUTPATIENT
Start: 2017-10-14 | End: 2018-01-22 | Stop reason: SDUPTHER

## 2017-10-17 RX ORDER — ALISKIREN 300 MG/1
300 TABLET, FILM COATED ORAL DAILY
Qty: 90 TABLET | Refills: 2 | Status: SHIPPED | OUTPATIENT
Start: 2017-10-17 | End: 2018-11-23 | Stop reason: SDUPTHER

## 2017-10-20 ENCOUNTER — OFFICE VISIT (OUTPATIENT)
Dept: RHEUMATOLOGY | Facility: CLINIC | Age: 82
End: 2017-10-20
Payer: MEDICARE

## 2017-10-20 VITALS
WEIGHT: 146 LBS | HEIGHT: 64 IN | HEART RATE: 55 BPM | BODY MASS INDEX: 24.92 KG/M2 | DIASTOLIC BLOOD PRESSURE: 55 MMHG | SYSTOLIC BLOOD PRESSURE: 135 MMHG

## 2017-10-20 DIAGNOSIS — M70.61 TROCHANTERIC BURSITIS, RIGHT HIP: ICD-10-CM

## 2017-10-20 DIAGNOSIS — M70.61 GREATER TROCHANTERIC BURSITIS OF RIGHT HIP: ICD-10-CM

## 2017-10-20 DIAGNOSIS — N91.2 AMENORRHEA: Primary | ICD-10-CM

## 2017-10-20 DIAGNOSIS — M75.81 RIGHT ROTATOR CUFF TENDINITIS: ICD-10-CM

## 2017-10-20 DIAGNOSIS — M75.101 TEAR OF RIGHT ROTATOR CUFF, UNSPECIFIED TEAR EXTENT: ICD-10-CM

## 2017-10-20 DIAGNOSIS — S46.811S TEAR OF RIGHT INFRASPINATUS TENDON, SEQUELA: ICD-10-CM

## 2017-10-20 DIAGNOSIS — M47.26 OTHER SPONDYLOSIS WITH RADICULOPATHY, LUMBAR REGION: ICD-10-CM

## 2017-10-20 DIAGNOSIS — Z78.0 ASYMPTOMATIC MENOPAUSAL STATE: ICD-10-CM

## 2017-10-20 DIAGNOSIS — M75.81 ROTATOR CUFF TENDINITIS, RIGHT: ICD-10-CM

## 2017-10-20 PROBLEM — S46.811A TEAR OF RIGHT INFRASPINATUS TENDON: Status: ACTIVE | Noted: 2017-10-20

## 2017-10-20 PROCEDURE — 99999 PR PBB SHADOW E&M-EST. PATIENT-LVL IV: CPT | Mod: PBBFAC,,, | Performed by: INTERNAL MEDICINE

## 2017-10-20 PROCEDURE — 99214 OFFICE O/P EST MOD 30 MIN: CPT | Mod: PBBFAC | Performed by: INTERNAL MEDICINE

## 2017-10-20 PROCEDURE — 20610 DRAIN/INJ JOINT/BURSA W/O US: CPT | Mod: PBBFAC | Performed by: INTERNAL MEDICINE

## 2017-10-20 PROCEDURE — 99212 OFFICE O/P EST SF 10 MIN: CPT | Mod: 25,S$PBB,, | Performed by: INTERNAL MEDICINE

## 2017-10-20 RX ORDER — TRIAMCINOLONE ACETONIDE 40 MG/ML
40 INJECTION, SUSPENSION INTRA-ARTICULAR; INTRAMUSCULAR
Status: DISCONTINUED | OUTPATIENT
Start: 2017-10-20 | End: 2017-10-20 | Stop reason: HOSPADM

## 2017-10-20 RX ADMIN — TRIAMCINOLONE ACETONIDE 40 MG: 40 INJECTION, SUSPENSION INTRA-ARTICULAR; INTRAMUSCULAR at 10:10

## 2017-10-20 ASSESSMENT — ROUTINE ASSESSMENT OF PATIENT INDEX DATA (RAPID3)
AM STIFFNESS SCORE: 1, YES
PAIN SCORE: 4
MDHAQ FUNCTION SCORE: .5
PATIENT GLOBAL ASSESSMENT SCORE: 6.5
PSYCHOLOGICAL DISTRESS SCORE: 1.1
TOTAL RAPID3 SCORE: 4.06
WHEN YOU AWAKENED IN THE MORNING OVER THE LAST WEEK, PLEASE INDICATE THE AMOUNT OF TIME IT TAKES UNTIL YOU ARE AS LIMBER AS YOU WILL BE FOR THE DAY: 10 MINS
FATIGUE SCORE: 6

## 2017-10-20 NOTE — ASSESSMENT & PLAN NOTE
* After verbal consent and cleansing with Chloraprep the right subacromial bursa injected with Kenalog 40mg with 1 ml 1 % lidocaine. Patient tolerated procedure well.  Ref to OT/PT

## 2017-10-20 NOTE — PROCEDURES
Large Joint Aspiration/Injection  Date/Time: 10/20/2017 10:27 AM  Performed by: ROMINA MITCHELL  Authorized by: ROMINA MITCHELL     Consent Done?:  Yes (Verbal)  Indications:  Pain  Procedure site marked: Yes    Timeout: Prior to procedure the correct patient, procedure, and site was verified      Location:  Shoulder  Site:  R subacromial bursa  Prep: Patient was prepped and draped in usual sterile fashion    Ultrasonic Guidance for needle placement: No  Needle size:  25 G  Approach:  Lateral  Medications:  40 mg triamcinolone acetonide 40 mg/mL  Aspirate amount (ml):  0  Patient tolerance:  Patient tolerated the procedure well with no immediate complications

## 2017-10-20 NOTE — PROGRESS NOTES
"Subjective:       Patient ID: Gerda Lai is a 84 y.o. female.    Chief Complaint: right Shoulder Pain    HPI  Slipped in house one month ago on wet floor tile and landed on right elbow, right lateral hip and overextended right shoulder. The right lateral hip has improved. The right knee also mildly swollen with intermittent pain since fall. Having some dizziness, and diastolic today low.  Review of Systems   Constitutional: Positive for fatigue. Negative for appetite change, fever and unexpected weight change.   HENT: Negative for mouth sores.         Dry mouth   Eyes: Negative for visual disturbance.   Respiratory: Negative for cough, shortness of breath and wheezing.    Cardiovascular: Negative for chest pain and palpitations.   Gastrointestinal: Negative for abdominal pain, anal bleeding, blood in stool, constipation, diarrhea, nausea and vomiting.   Genitourinary: Negative for dysuria, frequency and urgency.   Musculoskeletal: Negative for arthralgias, back pain, gait problem, joint swelling, myalgias, neck pain and neck stiffness.   Skin: Negative for rash.   Neurological: Negative for weakness, numbness and headaches.   Hematological: Negative for adenopathy. Does not bruise/bleed easily.   Psychiatric/Behavioral: Negative for sleep disturbance. The patient is not nervous/anxious.          Objective:   BP (!) 135/55   Pulse (!) 55   Ht 5' 3.6" (1.615 m)   Wt 66.2 kg (146 lb)   BMI 25.38 kg/m²      Physical Exam       Right Side Rheumatological Exam     The patient is tender to palpation of the shoulder    Shoulder Exam   Tenderness Location: subacromion    Range of Motion   Active Abduction:  30 abnormal   Passive Abduction:  30 abnormal   Extension: normal   Forward Flexion: normal   Forward Elevation: normal  Adduction: 10 abnormal  External Rotation 0 degrees: 30   Internal Rotation 0 degrees: L1     Crepitus: negative  Swelling: negative    Hip Exam   Tenderness Location: greater " trochanter    Range of Motion   The patient has normal right hip ROM.            Assessment/Plan         Problem List Items Addressed This Visit     Other spondylosis with radiculopathy, lumbar region    Right rotator cuff tendinitis    Current Assessment & Plan     * After verbal consent and cleansing with Chloraprep the right subacromial bursa injected with Kenalog 40mg with 1 ml 1 % lidocaine. Patient tolerated procedure well.  Ref to OT/PT         Greater trochanteric bursitis of right hip    Current Assessment & Plan     Ref to PT         Tear of right infraspinatus tendon      Other Visit Diagnoses     Amenorrhea    -  Primary    Relevant Orders    DXA Bone Density Spine And Hip    Asymptomatic menopausal state         Relevant Orders    DXA Bone Density Spine And Hip    Rotator cuff tendinitis, right        Relevant Orders    Large Joint Aspiration/Injection    Ambulatory Referral to Physical/Occupational Therapy    Tear of right rotator cuff, unspecified tear extent        Relevant Orders    Ambulatory Referral to Physical/Occupational Therapy    Trochanteric bursitis, right hip        Relevant Orders    Ambulatory Referral to Physical/Occupational Therapy        Defers high dose influenza to Dr. Sellers Monday

## 2017-10-24 ENCOUNTER — TELEPHONE (OUTPATIENT)
Dept: DERMATOLOGY | Facility: CLINIC | Age: 82
End: 2017-10-24

## 2017-10-24 NOTE — TELEPHONE ENCOUNTER
----- Message from Nancy Roach sent at 10/24/2017  1:25 PM CDT -----  Contact: katarina SUMMERS-pt- pt is calling to speak with the nurse to reschedule her appt on Thursday at 10-26 can you please call pt at [phone] jc

## 2017-11-10 ENCOUNTER — CLINICAL SUPPORT (OUTPATIENT)
Dept: REHABILITATION | Facility: HOSPITAL | Age: 82
End: 2017-11-10
Payer: MEDICARE

## 2017-11-10 DIAGNOSIS — M25.511 BILATERAL SHOULDER PAIN, UNSPECIFIED CHRONICITY: ICD-10-CM

## 2017-11-10 DIAGNOSIS — M25.611 DECREASED ROM OF RIGHT SHOULDER: ICD-10-CM

## 2017-11-10 DIAGNOSIS — M75.81 RIGHT ROTATOR CUFF TENDINITIS: ICD-10-CM

## 2017-11-10 DIAGNOSIS — M25.512 BILATERAL SHOULDER PAIN, UNSPECIFIED CHRONICITY: ICD-10-CM

## 2017-11-10 PROCEDURE — 97161 PT EVAL LOW COMPLEX 20 MIN: CPT | Mod: PO

## 2017-11-10 PROCEDURE — G8984 CARRY CURRENT STATUS: HCPCS | Mod: CK,PO

## 2017-11-10 PROCEDURE — 97110 THERAPEUTIC EXERCISES: CPT | Mod: PO

## 2017-11-10 PROCEDURE — G8985 CARRY GOAL STATUS: HCPCS | Mod: CJ,PO

## 2017-11-10 NOTE — PROGRESS NOTES
SEE EVALUATION IN PLAN OF CARE TAB      OUTPATIENT PHYSICAL THERAPY  PHYSICAL THERAPY EVALUATION    Name: Gerda Lai  Clinic Number: [id number]    Evaluation Date: 11/10/2017  Visit #: 1 / 20  Authorization period Expiration: 12/31/2017  Plan of Care Expiration: 2/10/2018  Precautions: standard    Time In: 1:09 PM  Time Out: 2:05 PM  Total 1:1 Treatment Time: 55 min    Discussed Plan of Care with patient: Yes    Gerda received 10 minutes of therapeutic exercise including:   Scap retraction  Stretching x 30 sec each: UT, pec low door stretch  (NEXT VISIT: AAROM wall walks, ER stretch at wall, chin tucks, wand AAROM)      Gerda received 5 minutes of manual therapy including:   Strain counterstrain to right TRP tender point for 90 seconds to decrease neuromuscular over-activation, to increase blood flow, and to decrease pain. Pt. was educated regarding potential adverse effects.  AMI grubbs to R G-H joint Grade 2      Written Home Exercises Provided: See Patient Instructions for 11/10/2017.  Exercises were reviewed and Gerda was able to demonstrate them prior to the end of the session. Pt received a written copy of exercises to perform at home. Gerda demonstrated good  understanding of the education provided.       Neela Barroso, PT

## 2017-11-10 NOTE — PLAN OF CARE
OUTPATIENT PHYSICAL THERAPY  PHYSICAL THERAPY EVALUATION    Name: Gerda Lai  Clinic Number: [id number]    Diagnosis:   1. Decreased ROM of right shoulder     2. Right rotator cuff tendinitis     3. Bilateral shoulder pain, unspecified chronicity       Physician: Cesar Burroughs MD  Treatment Orders: PT Eval and Treat  Past Medical History:   Diagnosis Date    Arthritis     Basal cell carcinoma 09/2016    right post auricular neck     Breast cancer     Cataract     Fibromyalgia     Hyperlipidemia     Hypertension     Personal history of colonic polyps     SCC (squamous cell carcinoma) 2015    R chest    SCC (squamous cell carcinoma) 2016    left medial shoulder    SCC (squamous cell carcinoma) 2017    left knee    Squamous cell carcinoma 2015    right forearm    Thyroid disease     Vaginitis      Gerda Lai  has a past surgical history that includes thyriodectomy; Total hip arthroplasty; Mastectomy; tonsillectomy; Adenoidectomy; Cataract extraction w/  intraocular lens implant (Bilateral); and Yag  (Left).    Current Outpatient Prescriptions   Medication Sig    acetaminophen (TYLENOL) 650 MG TbSR Take 650 mg by mouth as needed.    aliskiren (TEKTURNA) 300 MG Tab Take 1 tablet (300 mg total) by mouth once daily.    amlodipine (NORVASC) 2.5 MG tablet Take 1 tablet (2.5 mg total) by mouth once daily.    ascorbic acid (VITAMIN C) 100 MG tablet Take 100 mg by mouth once daily.    B INFANTIS/B ANI/B JOSE/B BIFID (PROBIOTIC 4X ORAL) Take 1 tablet by mouth daily    chlorthalidone (HYGROTEN) 25 MG Tab Take 12.5 mg by mouth once daily.     coenzyme Q10 (CO Q-10) 100 mg capsule Take 100 mg by mouth once daily.    fluticasone (FLONASE) 50 mcg/actuation nasal spray USE 1 SPRAY IN EACH NOSTRIL ONCE DAILY (Patient taking differently: as needed. )    glucosamine-chondroitin 500-400 mg tablet Take 1 tablet by mouth 2 (two) times daily.     labetalol (NORMODYNE) 100 MG tablet TAKE 1 AND 1/2 TABLETS BY  "MOUTH TWICE A DAY    lorazepam (ATIVAN) 0.5 MG tablet TAKE 1/2 TABLET BY MOUTH TWICE A DAY    multivitamin capsule Take 1 capsule by mouth once daily.    omeprazole (PRILOSEC OTC) 20 MG tablet Take 20 mg by mouth once daily.      pravastatin (PRAVACHOL) 40 MG tablet TAKE 1 TABLET (40 MG TOTAL) BY MOUTH ONCE DAILY.    UNABLE TO FIND once daily. OTC EYE DROP; Systane     No current facility-administered medications for this visit.      Review of patient's allergies indicates:   Allergen Reactions    Voltaren [diclofenac sodium] Other (See Comments)     Drops blood pressure    Clarithromycin Other (See Comments)     Weak, extreme fatigue. dizziness    Losartan Rash    Flexeril [cyclobenzaprine] Other (See Comments)     Dizziness    Iodine and iodide containing products     Lisinopril Other (See Comments)     Cough and sensation of throat swelling/?angioedema    Metoprolol Swelling     Tightness in throat    Sulfa (sulfonamide antibiotics) Rash    Tramadol      Dizzy and weak    Verapamil (bulk) Palpitations       History   Prior Therapy: for leg pain  Social History: avoids sleeping on sides due to RTC tears, tries to sleep supine on 2 pillows.  Previous functional status: chronic pain. Used to go to gym. Used to garden.  Current functional status: Uses Biofreeze on entire arm once/day. Gets massage every two weeks. Doesn't exercise "because I have too many problems." Doing HEP for legs since last PT.    Subjective   History of Present Illness: had carpets cleaned, slipped on wet floor, landing on rear end and R elbow. Previous RTC tear that has worsened since fall. Pt is RHD. Had injection 1 month ago which helped some but no recent improvement. C/o dizziness and low energy due to taking multiple medications.  DOI: 3 months ago  Imaging, x-rays: Severe DJD with high riding right humeral head suggestive of rotator cuff pathology.  No fracture seen.  Pain, R neck/shoulder: current 2/10, worst 5/10, best " 2/10, Aching, constant  Radicular symptoms: numbness to R 3rd/4th digits, worse when waking up  Aggravating factors: gardening. Carrying any object (including purse).  Easing factors: Tylenol every day  Pts goals: The patients goal is to return to PLOF without pain or risk of re-injury.    Objective   Mental status: alert and oriented x 3 and pleasant and cooperative  Posture/ Alignment: Poor, Protruded Head, Protracted Scapula    VITALS:  Time BP HR Comments   1:27 PM  135 / 71 54 bpm    2:02  / 68 55 bpm      FUNCTION:   - Apley scratch: R T10, L T8  - Chin tuck: unable to perform correctly  - Scap Retraction: compensates with shoulder extension and elevation  - Bed Mobility: supine-->long sit with max effort and provokes dizziness    ROM:   AROM Right Left Comment   Shoulder Flexion: 138 degrees 123 degrees    Shoulder Abduction: 98* degrees 106 degrees    PROM Right Left Comment   Shoulder Flexion: 125 degrees 142 degrees    Shoulder Abduction: 125 degrees 136 degrees    Shoulder ER, 90°ABD: 30* degrees 56* degrees guarding   Shoulder IR, 90° ABD: 66 degrees 90 degrees    *pain    Strength:    Right Left Comment   Shoulder flexion: 3-/5 3-/5    Shoulder Abduction: 3-/5 3-/5    Shoulder ER: 2/5 2/5    Shoulder IR: 3+/5 4/5      Special Tests:  - Painful Arc: L positive 100-->80 de.grees  - Neers: negative  - Damon-Liang: left positive  - Lift-off: negative  - ER Lag: negative  - Drop Arm: left positive  - Full/Empty Can: right positive  - Crank Test: negative  - Apprehension/Relocation: negative    TENDER POINTS of SHOULDER/ELBOW/WRIST/HAND:   RIGHT LEFT   Anterior Acromioclavicular (AAC): non tender non tender   Sub Deltoid Bursa (BUR): non tender non tender   Long Head Biceps (LH): non tender non tender   Short Head Biceps (SH): tender non tender   Medial Coracoid (MC): tender non tender   Lateral Coracoid (LC): tender non tender   Subscapularis (SUB): tender non tender   Latissimus Dorsi (LD):   non tender non tender   Adduction/Frozen Shoulder (ADD): non tender non tender   Posterior Acromioclavicular (PAC): non tender non tender   Supraspinatus (SUP): tender tender   Medial 2nd Thoracic (MTS2):  non tender tender   Lateral 2nd Thoracic (LTS2): tender non tender   Point on Spine (POS): non tender non tender   Infraspinatus (TS3): tender non tender   Teres Major (TMJ): tender non tender   Teres Minor (TMI): tender tender   Trapezius (TRP): Tender (+) jump sign tender     Joint Play:  Hypomobility and guarding with all G-H mobs of RUE    Pt/family was provided educational information, including: role of PT, goals for PT, scheduling - pt verbalized understanding. Discussed insurance plan with pt.     Assessment   Gerda is a 84 y.o. female referred to outpatient physical therapy with a diagnosis of R shoulder pain and limited ROM due to fall landing on R elbow, chronic degenerative changes, and h/o bilateral RTC pathology. Demonstrates impairments including limitations as described in the problem list. Unable to participate fully in daily activities. Pt understands the diagnosis and the need for treatment. Pt prognosis is Fair. Pt will benefit from skilled outpatient physical therapy to address the above stated deficits, provide pt/family education, and to maximize pt's level of independence.     History  Co-morbidities and personal factors that may impact the plan of care Examination  Body Structures and Functions, activity limitations and participation restrictions that may impact the plan of care    Clinical Presentation   Co-morbidities:   advanced age and cancer, fibromyalgia, OA of multiple joints        Personal Factors:   coping style Body Regions:   neck  upper extremities    Body Systems:   gross symmetry  ROM  strength  transfers  blood pressure        Participation Restrictions:   Gardening, carrying     Activity limitations:   Mobility  lifting and carrying objects    Self care  washing oneself  (bathing, drying, washing hands)    Domestic Life  gardening    Community and Social Life  recreation and leisure         stable and uncomplicated                      low   high  moderate Decision Making/ Complexity Score:  low     Pt's spiritual, cultural and educational needs considered and pt agreeable to plan of care and goals as stated below:     Anticipated Barriers for physical therapy: none      PT reviewed FOTO scores for Gerda Lai on 11/10/2017.   FOTO scores were entered into [employer] - see media section.    CMS Impairment/Limitation/Restriction for FOTO Shoulder Survey        Status  Limit   G-Code CMS Severity Modifier  Intake      56%      44% Current Status CK - At least 40 percent but less than 60 percent  Predicted  62%    38% Goal Status+ CJ - At least 20 percent but less than 40 percent  CATEGORY: Carrying     Short Term GOALS:  In 4 weeks, pt. will:  - report compliance and demonstrate correct form with initial HEP  - increase PROM by 10 degrees to reach with less pain overhead  - sit with appropriate posture without forward head or protracted/downwardly rotated scapula with min cueing for 3 minutes  - lift 5# object floor to waist with RUE without increase in pain 3 of 3 trials  - decrease outcome measure limitation to 40%    Long Term GOALS:  In 12 weeks, pt. will:  - be independent and compliant with HEP and SX management   - increase PROM by 20 degrees to reach with less pain overhead or behind back  - sit with appropriate posture without forward head or protracted/downwardly rotated scapula with no cueing for 10 minutes  - lift 10# object floor to waist with RUE without increase in pain 5 of 5 trials  - lift 3# waist to overhead without increase in pain with good motor control to improve ability to reach cabinets  - decrease outcome measure limitation to 38%    Plan   Outpatient physical therapy 1- 2 times weekly to include: pt ed, HEP, therapeutic exercises, therapeutic activities,  neuromuscular re-education/ balance exercises, manual therapy, and modalities prn. Cont PT for 2-3 months. Pt may be seen by PTA as part of the rehabilitation team.     I certify the need for these services furnished under this plan of treatment and while under my care.    Neela Barroso, PT

## 2017-11-10 NOTE — PATIENT INSTRUCTIONS
BED MOBILITY: Side-Lying to Sit    Lie on back, roll completely to side, move legs to edge of bed. Push down with both hands/elbows while moving legs off bed to reach sitting position.        Flexibility: Upper Trapezius Stretch    Sit up tall and place one hand behind your back or under your thigh and the other on top of your head.  Gently draw your head towards the side of your top hand. Do not over-stretch.  Hold 30 seconds.   Repeat 1 times each side per set. Do 1 sets per session. Do 2-3 sessions per day.        Pec Door Stretch     doorframe with BOTH palms, forearms, and elbows against frame. Lean forward until a gentle stretch is felt in the chest. Hold 30 seconds.  Repeat 1 times per session. Do 2-3 sessions per day.        Scapular Retraction (Standing)    With arms at sides, squeeze shoulder blades together. Do not shrug and do not hold your breath. Hold 5 seconds.  Repeat 10 times per session. Do 1 sessions per day.     Copyright © I. All rights reserved.

## 2017-11-13 ENCOUNTER — CLINICAL SUPPORT (OUTPATIENT)
Dept: REHABILITATION | Facility: HOSPITAL | Age: 82
End: 2017-11-13
Payer: MEDICARE

## 2017-11-13 DIAGNOSIS — M25.611 DECREASED ROM OF RIGHT SHOULDER: ICD-10-CM

## 2017-11-13 DIAGNOSIS — M75.81 RIGHT ROTATOR CUFF TENDINITIS: ICD-10-CM

## 2017-11-13 PROCEDURE — 97110 THERAPEUTIC EXERCISES: CPT | Mod: PO

## 2017-11-13 NOTE — PROGRESS NOTES
OUTPATIENT PHYSICAL THERAPY  PHYSICAL THERAPY PROGRESS NOTE    Name: Gerda Lai  Clinic Number: [id number]    Evaluation Date: 11/13/2017  Visit #: 2 / 20  Authorization period Expiration: 12/31/2017  Plan of Care Expiration: 2/10/2018  Precautions: standard    Time In: 2:05 PM  Time Out: 3:00 PM  Total 1:1 Treatment Time: 55 min      Gerda received 50 minutes of therapeutic exercise including:     Chin tucks x 20  Scap retraction x 20  Stretching 2 x 30 sec each: UT, pec low door stretch  Supine wand AAROM 2 x 10  AAROM wall walks x 10  ER stretch at wall 2 x 30  Scapular retraction with OTB x 20 5 sec  Shoulder extension with OTB x 20 5 sec    Pt received 1 on 1 therapeutic exercise for 22 minutes    Gerda received 5 minutes of manual therapy including:   AP glides to R G-H joint Grade 2      Written Home Exercises Provided: ER stretch at wall, scapular retraction and extension with OTB  Exercises were reviewed and Gerda was able to demonstrate them prior to the end of the session. Pt received a written copy of exercises to perform at home. Gerda demonstrated good  understanding of the education provided.   .      Assessment     Pt tolerated treatment well with no increase in pain.  Pt requires some redirecting and encouragement to stay on task and perform increased reps.  Will try and progress as tolerated.  Pt will benefit from skilled outpatient physical therapy to address the above stated deficits, provide pt/family education, and to maximize pt's level of independence.              History  Co-morbidities and personal factors that may impact the plan of care Examination  Body Structures and Functions, activity limitations and participation restrictions that may impact the plan of care   Clinical Presentation   Co-morbidities:   advanced age and cancer, fibromyalgia, OA of multiple joints           Personal Factors:   coping style Body Regions:   neck  upper extremities     Body Systems:   gross  symmetry  ROM  strength  transfers  blood pressure           Participation Restrictions:   Gardening, carrying    Activity limitations:   Mobility  lifting and carrying objects     Self care  washing oneself (bathing, drying, washing hands)     Domestic Life  gardening     Community and Social Life  recreation and leisure             stable and uncomplicated                                low   high   moderate Decision Making/ Complexity Score:  low      Pt's spiritual, cultural and educational needs considered and pt agreeable to plan of care and goals as stated below:      Anticipated Barriers for physical therapy: none        PT reviewed FOTO scores for Gerda Lai on 11/10/2017.   FOTO scores were entered into [employer] - see media section.     CMS Impairment/Limitation/Restriction for FOTO Shoulder Survey                   Status  Limit           G-Code CMS Severity Modifier  Intake      56%      44% Current Status CK - At least 40 percent but less than 60 percent  Predicted  62%    38% Goal Status+ CJ - At least 20 percent but less than 40 percent  CATEGORY: Carrying      Short Term GOALS:  In 4 weeks, pt. will:  - report compliance and demonstrate correct form with initial HEP  - increase PROM by 10 degrees to reach with less pain overhead  - sit with appropriate posture without forward head or protracted/downwardly rotated scapula with min cueing for 3 minutes  - lift 5# object floor to waist with RUE without increase in pain 3 of 3 trials  - decrease outcome measure limitation to 40%     Long Term GOALS:  In 12 weeks, pt. will:  - be independent and compliant with HEP and SX management   - increase PROM by 20 degrees to reach with less pain overhead or behind back  - sit with appropriate posture without forward head or protracted/downwardly rotated scapula with no cueing for 10 minutes  - lift 10# object floor to waist with RUE without increase in pain 5 of 5 trials  - lift 3# waist to overhead without  increase in pain with good motor control to improve ability to reach cabinets  - decrease outcome measure limitation to 38%     Plan   Outpatient physical therapy 1- 2 times weekly to include: pt ed, HEP, therapeutic exercises, therapeutic activities, neuromuscular re-education/ balance exercises, manual therapy, and modalities prn. Cont PT for 2-3 months. Pt may be seen by PTA as part of the rehabilitation team.

## 2017-11-17 ENCOUNTER — TELEPHONE (OUTPATIENT)
Dept: DERMATOLOGY | Facility: CLINIC | Age: 82
End: 2017-11-17

## 2017-11-17 NOTE — TELEPHONE ENCOUNTER
----- Message from Debra Benigno sent at 11/17/2017  8:13 AM CST -----  Contact: pt at [phone]  Kirsten pt-pt statesd th at [facility] cannot weait until Feb. To see Kirsten.  Pt needs appt asap for her knee.  Area that was treated is not healing.

## 2017-11-18 ENCOUNTER — HOSPITAL ENCOUNTER (EMERGENCY)
Facility: HOSPITAL | Age: 82
Discharge: HOME | End: 2017-11-18
Attending: EMERGENCY MEDICINE
Payer: MEDICARE

## 2017-11-18 VITALS
RESPIRATION RATE: 18 BRPM | SYSTOLIC BLOOD PRESSURE: 110 MMHG | OXYGEN SATURATION: 97 % | TEMPERATURE: 98 F | DIASTOLIC BLOOD PRESSURE: 53 MMHG | HEART RATE: 60 BPM | BODY MASS INDEX: 26.05 KG/M2 | WEIGHT: 147 LBS | HEIGHT: 63 IN

## 2017-11-18 DIAGNOSIS — M25.562 CHRONIC PAIN OF LEFT KNEE: Primary | ICD-10-CM

## 2017-11-18 DIAGNOSIS — G89.29 CHRONIC PAIN OF LEFT KNEE: Primary | ICD-10-CM

## 2017-11-18 PROCEDURE — 99283 EMERGENCY DEPT VISIT LOW MDM: CPT | Mod: GC,,, | Performed by: EMERGENCY MEDICINE

## 2017-11-18 PROCEDURE — 99283 EMERGENCY DEPT VISIT LOW MDM: CPT

## 2017-11-18 RX ORDER — MELOXICAM 7.5 MG/1
7.5 TABLET ORAL DAILY
Qty: 7 TABLET | Refills: 0 | Status: SHIPPED | OUTPATIENT
Start: 2017-11-18 | End: 2017-11-21 | Stop reason: SDUPTHER

## 2017-11-18 NOTE — ED PROVIDER NOTES
Encounter Date: 11/18/2017    SCRIBE #1 NOTE: I, Suma Ray, am scribing for, and in the presence of,  Dr. Tolliver. I have scribed the following portions of the note - the Resident attestation.       History     Chief Complaint   Patient presents with    Left knee pain     Gerda Lai is a 84 y.o. female presents with left knee pain that has been chronic in nature but has increased over the last couple of days. The pain is achy in nature increased with any activity and improved with rest. It is located medially over the knee the most. She denies any trauma, fever or chills.           Review of patient's allergies indicates:   Allergen Reactions    Voltaren [diclofenac sodium] Other (See Comments)     Drops blood pressure    Clarithromycin Other (See Comments)     Weak, extreme fatigue. dizziness    Losartan Rash    Flexeril [cyclobenzaprine] Other (See Comments)     Dizziness    Iodine and iodide containing products     Lisinopril Other (See Comments)     Cough and sensation of throat swelling/?angioedema    Metoprolol Swelling     Tightness in throat    Sulfa (sulfonamide antibiotics) Rash    Tramadol      Dizzy and weak    Verapamil (bulk) Palpitations     Past Medical History:   Diagnosis Date    Arthritis     Basal cell carcinoma 09/2016    right post auricular neck     Breast cancer     Cataract     Fibromyalgia     Hyperlipidemia     Hypertension     Personal history of colonic polyps     SCC (squamous cell carcinoma) 2015    R chest    SCC (squamous cell carcinoma) 2016    left medial shoulder    SCC (squamous cell carcinoma) 2017    left knee    Squamous cell carcinoma 2015    right forearm    Thyroid disease     Vaginitis      Past Surgical History:   Procedure Laterality Date    ADENOIDECTOMY      CATARACT EXTRACTION W/  INTRAOCULAR LENS IMPLANT Bilateral     MASTECTOMY      partial right    thyriodectomy      partial    tonsillectomy      TOTAL HIP ARTHROPLASTY       right    Yag  Left      Family History   Problem Relation Age of Onset    Breast cancer Mother     Stroke Paternal Grandfather     Heart disease Father     Cancer Paternal Aunt     Heart disease Paternal Aunt     Glaucoma Paternal Grandmother     Amblyopia Neg Hx     Blindness Neg Hx     Cataracts Neg Hx     Diabetes Neg Hx     Hypertension Neg Hx     Macular degeneration Neg Hx     Retinal detachment Neg Hx     Strabismus Neg Hx     Thyroid disease Neg Hx     Melanoma Neg Hx      Social History   Substance Use Topics    Smoking status: Never Smoker    Smokeless tobacco: Never Used    Alcohol use 0.0 oz/week     2 - 3 Glasses of wine per week      Comment: socially     Review of Systems   Constitutional: Negative for activity change, appetite change, chills and fever.   HENT: Negative for postnasal drip and rhinorrhea.    Eyes: Negative for photophobia and visual disturbance.   Respiratory: Negative for cough, chest tightness and wheezing.    Cardiovascular: Negative for chest pain, palpitations and leg swelling.   Gastrointestinal: Negative for diarrhea and nausea.   Musculoskeletal: Positive for gait problem and joint swelling. Negative for arthralgias and myalgias.   Neurological: Negative for weakness and numbness.   Psychiatric/Behavioral: Negative for agitation, behavioral problems and confusion.       Physical Exam     Initial Vitals [11/18/17 1202]   BP Pulse Resp Temp SpO2   (!) 110/53 60 18 98.3 °F (36.8 °C) 97 %      MAP       72         Physical Exam    Nursing note and vitals reviewed.  Constitutional: She appears well-developed and well-nourished. She is not diaphoretic. No distress.   HENT:   Head: Normocephalic and atraumatic.   Eyes: EOM are normal. Pupils are equal, round, and reactive to light.   Neck: Normal range of motion. Neck supple.   Cardiovascular: Normal rate, regular rhythm, normal heart sounds and intact distal pulses.   Pulmonary/Chest: Breath sounds normal. No  respiratory distress. She has no wheezes.   Musculoskeletal:       Left Knee: Swelling Moderate  TendernessMedial joint line  Range of Motion: 0-130  TTP over the pes bursa     Neurological: She is alert and oriented to person, place, and time. She has normal strength.   Skin: Skin is warm and dry.         ED Course   Procedures  Labs Reviewed - No data to display          Medical Decision Making:   History:   Old Medical Records: I decided to obtain old medical records.  Initial Assessment:   1340: pt seen and examined. Discussed with staff. Dx is osteoarthritis of the left knee with pes bursitis. There is no need for new imaging with chronicity and no trauma. No concern for septic joint. Mobic given and will fu with her PCP.   Differential Diagnosis:   1340: pt seen and examined. Discussed with staff. Dx is osteoarthritis of the left knee with pes bursitis. There is no need for new imaging with chronicity and no trauma. No concern for septic joint. Mobic given and will fu with her PCP.   Other:   I discussed test(s) with the performing physician.       APC / Resident Notes:   1340: pt seen and examined. Discussed with staff. Dx is osteoarthritis of the left knee with pes bursitis. There is no need for new imaging with chronicity and no trauma. No concern for septic joint. Mobic given and will fu with her PCP.        Scribe Attestation:   Scribe #1: I performed the above scribed service and the documentation accurately describes the services I performed. I attest to the accuracy of the note.    Attending Attestation:   Physician Attestation Statement for Resident:  As the supervising MD   Physician Attestation Statement: I have personally seen and examined this patient.   I agree with the above history. -: 84 year old female history of arthritis who presents for evaluation of atraumatic knee pain    As the supervising MD I agree with the above PE.    As the supervising MD I agree with the above treatment, course,  plan, and disposition.  I have reviewed the following: old records at this facility.                    ED Course      Clinical Impression:   The encounter diagnosis was Chronic pain of left knee.    Disposition:   Disposition: Discharged  Condition: Stable  Dx is osteoarthritis of the left knee with pes bursitis. There is no need for new imaging with chronicity and no trauma. No concern for septic joint. Mobic given and will fu with her PCP.                         Carlos Ko MD  Resident  11/20/17 0557       Francisco Tolliver MD  11/22/17 [id number]

## 2017-11-18 NOTE — ED TRIAGE NOTES
Presents to ER with pain to her left knee.  Slight swelling noted to left knee area.    GENERAL: The patient is a frail elderly female in no apparent distress. Alert and oriented x4.                                                HEENT: Head is normocephalic and atraumatic. Extraocular muscles are intact. Pupils are equal, round, and reactive to light and accommodation. Nares appeared normal. Mouth is well hydrated and without lesions. Mucous membranes are moist. Posterior pharynx clear of any exudate or lesions.    NECK: Supple. No carotid bruits. No lymphadenopathy or thyromegaly.    LUNGS: Clear to auscultation.    HEART: Regular rate and rhythm without murmur.     ABDOMEN: Soft, nontender, and nondistended. Positive bowel sounds. No hepatosplenomegaly was noted.     EXTREMITIES: Without any cyanosis, clubbing, rash, or lesions.      NEUROLOGIC: Cranial nerves II through XII are grossly intact.     PSYCHIATRIC: Flat affect, but denies suicidal or homicidal ideations.    SKIN: No ulceration or induration present.

## 2017-11-21 ENCOUNTER — HOSPITAL ENCOUNTER (OUTPATIENT)
Dept: RADIOLOGY | Facility: HOSPITAL | Age: 82
Discharge: HOME | End: 2017-11-21
Attending: FAMILY MEDICINE
Payer: MEDICARE

## 2017-11-21 ENCOUNTER — OFFICE VISIT (OUTPATIENT)
Dept: FAMILY MEDICINE | Facility: CLINIC | Age: 82
End: 2017-11-21
Payer: MEDICARE

## 2017-11-21 VITALS
TEMPERATURE: 98 F | SYSTOLIC BLOOD PRESSURE: 140 MMHG | WEIGHT: 149.5 LBS | BODY MASS INDEX: 26.49 KG/M2 | HEIGHT: 63 IN | DIASTOLIC BLOOD PRESSURE: 60 MMHG | HEART RATE: 60 BPM

## 2017-11-21 DIAGNOSIS — M89.8X6 PAIN IN LEFT TIBIA: ICD-10-CM

## 2017-11-21 DIAGNOSIS — Z23 NEED FOR PROPHYLACTIC VACCINATION AND INOCULATION AGAINST INFLUENZA: ICD-10-CM

## 2017-11-21 DIAGNOSIS — M89.8X6 PAIN IN LEFT TIBIA: Primary | ICD-10-CM

## 2017-11-21 PROCEDURE — G0008 ADMIN INFLUENZA VIRUS VAC: HCPCS | Mod: PBBFAC,PO

## 2017-11-21 PROCEDURE — 73590 X-RAY EXAM OF LOWER LEG: CPT | Mod: TC,PO,LT

## 2017-11-21 PROCEDURE — 99999 PR PBB SHADOW E&M-EST. PATIENT-LVL IV: CPT | Mod: PBBFAC,,, | Performed by: FAMILY MEDICINE

## 2017-11-21 PROCEDURE — 73590 X-RAY EXAM OF LOWER LEG: CPT | Mod: 26,LT,, | Performed by: RADIOLOGY

## 2017-11-21 PROCEDURE — 99214 OFFICE O/P EST MOD 30 MIN: CPT | Mod: S$PBB,,, | Performed by: FAMILY MEDICINE

## 2017-11-21 PROCEDURE — 99214 OFFICE O/P EST MOD 30 MIN: CPT | Mod: PBBFAC,25,PO | Performed by: FAMILY MEDICINE

## 2017-11-21 RX ORDER — MELOXICAM 7.5 MG/1
7.5 TABLET ORAL DAILY
Qty: 30 TABLET | Refills: 0 | Status: SHIPPED | OUTPATIENT
Start: 2017-11-21 | End: 2018-01-04 | Stop reason: SDUPTHER

## 2017-11-21 RX ORDER — MELOXICAM 7.5 MG/1
7.5 TABLET ORAL DAILY
Qty: 30 TABLET | Refills: 0 | Status: SHIPPED | OUTPATIENT
Start: 2017-11-21 | End: 2017-11-21 | Stop reason: SDUPTHER

## 2017-11-21 NOTE — PROGRESS NOTES
Subjective:       Patient ID: Gerda Lai is a 84 y.o. female.    Chief Complaint: Follow-up (er-visit)    Disclaimer: This note has been generated using voice-recognition software. There may be typographical errors that have been missed during proof-reading    85 yo presents today for follow up after initial ED evaluation on 11/18/17 for left knee pain.  She was started on Mobic 7.5mg daily with partial relief of pain.  She describes pain as dull, about 4/10, worse with ambulation.  No local swelling or erythema.  No other joint symptoms      Review of Systems   Musculoskeletal: Positive for arthralgias. Negative for gait problem and joint swelling.   Neurological: Negative for weakness and numbness.       Objective:      Physical Exam   Musculoskeletal:   Left knee exam shows full flexion/extension   No popliteal swelling   There is tenderness to palpation along the medial and lateral joint lines  Lachman's, Lala's negative  Pt is discretely tender over the anteromedial aspect of left tibia       Assessment:       1. Pain in left tibia    2. Need for prophylactic vaccination and inoculation against influenza        Plan:       1.  X rays interpreted by me show no lesions over tibia, DJD involving left knee and patella  2.  Continue Mobic for one more week, if not improved, plan to consult Orthopedics

## 2017-11-21 NOTE — PROGRESS NOTES
Two patient identifiers verified.  Allergies reviewed. High flu  IM administered to left deltoid per MD order.  Patient tolerated injection well; no redness, bleeding, or bruising noted to injection site.  Patient instructed to remain in clinic setting for 15 minutes.  Verbalizes understanding.

## 2017-11-22 ENCOUNTER — OUTPATIENT CASE MANAGEMENT (OUTPATIENT)
Dept: ADMINISTRATIVE | Facility: OTHER | Age: 82
End: 2017-11-22

## 2017-11-22 ENCOUNTER — CLINICAL SUPPORT (OUTPATIENT)
Dept: REHABILITATION | Facility: HOSPITAL | Age: 82
End: 2017-11-22
Payer: MEDICARE

## 2017-11-22 ENCOUNTER — OFFICE VISIT (OUTPATIENT)
Dept: DERMATOLOGY | Facility: CLINIC | Age: 82
End: 2017-11-22
Payer: MEDICARE

## 2017-11-22 DIAGNOSIS — M25.611 DECREASED ROM OF RIGHT SHOULDER: ICD-10-CM

## 2017-11-22 DIAGNOSIS — L30.0 NUMMULAR ECZEMA: Primary | ICD-10-CM

## 2017-11-22 DIAGNOSIS — L82.1 SK (SEBORRHEIC KERATOSIS): ICD-10-CM

## 2017-11-22 DIAGNOSIS — M75.81 RIGHT ROTATOR CUFF TENDINITIS: ICD-10-CM

## 2017-11-22 DIAGNOSIS — L57.0 AK (ACTINIC KERATOSIS): ICD-10-CM

## 2017-11-22 DIAGNOSIS — L90.5 SCAR: ICD-10-CM

## 2017-11-22 PROCEDURE — 99212 OFFICE O/P EST SF 10 MIN: CPT | Mod: PBBFAC,PO | Performed by: DERMATOLOGY

## 2017-11-22 PROCEDURE — 97110 THERAPEUTIC EXERCISES: CPT | Mod: PO

## 2017-11-22 PROCEDURE — 99999 PR PBB SHADOW E&M-EST. PATIENT-LVL II: CPT | Mod: PBBFAC,,, | Performed by: DERMATOLOGY

## 2017-11-22 PROCEDURE — 17003 DESTRUCT PREMALG LES 2-14: CPT | Mod: S$PBB,,, | Performed by: DERMATOLOGY

## 2017-11-22 PROCEDURE — 17003 DESTRUCT PREMALG LES 2-14: CPT | Mod: PBBFAC,PO | Performed by: DERMATOLOGY

## 2017-11-22 PROCEDURE — 17000 DESTRUCT PREMALG LESION: CPT | Mod: PBBFAC,PO | Performed by: DERMATOLOGY

## 2017-11-22 PROCEDURE — 99213 OFFICE O/P EST LOW 20 MIN: CPT | Mod: 25,S$PBB,, | Performed by: DERMATOLOGY

## 2017-11-22 PROCEDURE — 17000 DESTRUCT PREMALG LESION: CPT | Mod: S$PBB,,, | Performed by: DERMATOLOGY

## 2017-11-22 RX ORDER — TRIAMCINOLONE ACETONIDE 1 MG/G
CREAM TOPICAL
Qty: 60 G | Refills: 3 | Status: ON HOLD | OUTPATIENT
Start: 2017-11-22 | End: 2018-03-06

## 2017-11-22 NOTE — PROGRESS NOTES
OUTPATIENT PHYSICAL THERAPY  PHYSICAL THERAPY PROGRESS NOTE    Name: Gerda Lai  Clinic Number: [id number]    Evaluation Date: 11/22/2017  Visit #: 3 / 20  Authorization period Expiration: 12/31/2017  Plan of Care Expiration: 2/10/2018  Precautions: standard    Time In: 1:10 PM  Time Out: 2:00  PM  Total 1:1 Treatment Time: 50 min         Subjective        Patient reports she started having increased pain in the L knee since last treatment.  Pt reports she went to the ER because she was worried it might be some type of infection.  Pt recently had skin cancer removed from the left medial knee so she wanted to be sure everything was still okay.  Pt reports they did an Xray and think it may be bursitis.  Pt reports no soreness or pain in the shoulder following last treatment.  Pt reports she has not been doing her exercises because she has been dealing with the issue of the knee.      Objective        Gerda received 50 minutes of therapeutic exercise including:     UBE 2' forward/2' backwards  Chin tucks x 20  Scap retraction x 20  Stretching 2 x 30 sec each: UT, pec low door stretch  Supine wand AAROM 2 x 10  AAROM wall walks x 10  ER stretch at wall 2 x 30  Scapular retraction with OTB x 20 5 sec  Shoulder extension with OTB x 20 5 sec    Pt received 1 on 1 therapeutic exercise for 22 minutes    Gerda received 5 minutes of manual therapy including: Not performed today  AP glides to R G-H joint Grade 2      Written Home Exercises Provided: none added  Exercises were reviewed and Gerda was able to demonstrate them prior to the end of the session. Pt received a written copy of exercises to perform at home. Gerda demonstrated good  understanding of the education provided.   .      Assessment     Pt tolerated treatment well with no increase in pain.  Pt requires some redirecting and encouragement to stay on task. Will try and progress as tolerated.  Pt will benefit from skilled outpatient physical therapy to  address the above stated deficits, provide pt/family education, and to maximize pt's level of independence.              History  Co-morbidities and personal factors that may impact the plan of care Examination  Body Structures and Functions, activity limitations and participation restrictions that may impact the plan of care   Clinical Presentation   Co-morbidities:   advanced age and cancer, fibromyalgia, OA of multiple joints           Personal Factors:   coping style Body Regions:   neck  upper extremities     Body Systems:   gross symmetry  ROM  strength  transfers  blood pressure           Participation Restrictions:   Gardening, carrying    Activity limitations:   Mobility  lifting and carrying objects     Self care  washing oneself (bathing, drying, washing hands)     Domestic Life  gardening     Community and Social Life  recreation and leisure             stable and uncomplicated                                low   high   moderate Decision Making/ Complexity Score:  low      Pt's spiritual, cultural and educational needs considered and pt agreeable to plan of care and goals as stated below:      Anticipated Barriers for physical therapy: none        PT reviewed FOTO scores for Gerda Lai on 11/10/2017.   FOTO scores were entered into [email or web address] - see media section.     CMS Impairment/Limitation/Restriction for FOTO Shoulder Survey                   Status  Limit           G-Code CMS Severity Modifier  Intake      56%      44% Current Status CK - At least 40 percent but less than 60 percent  Predicted  62%    38% Goal Status+ CJ - At least 20 percent but less than 40 percent  CATEGORY: Carrying      Short Term GOALS:  In 4 weeks, pt. will:  - report compliance and demonstrate correct form with initial HEP  - increase PROM by 10 degrees to reach with less pain overhead  - sit with appropriate posture without forward head or protracted/downwardly rotated scapula with min cueing for 3 minutes  - lift 5# object  floor to waist with RUE without increase in pain 3 of 3 trials  - decrease outcome measure limitation to 40%     Long Term GOALS:  In 12 weeks, pt. will:  - be independent and compliant with HEP and SX management   - increase PROM by 20 degrees to reach with less pain overhead or behind back  - sit with appropriate posture without forward head or protracted/downwardly rotated scapula with no cueing for 10 minutes  - lift 10# object floor to waist with RUE without increase in pain 5 of 5 trials  - lift 3# waist to overhead without increase in pain with good motor control to improve ability to reach cabinets  - decrease outcome measure limitation to 38%     Plan   Outpatient physical therapy 1- 2 times weekly to include: pt ed, HEP, therapeutic exercises, therapeutic activities, neuromuscular re-education/ balance exercises, manual therapy, and modalities prn. Cont PT for 2-3 months. Pt may be seen by PTA as part of the rehabilitation team.

## 2017-11-22 NOTE — PROGRESS NOTES
Subjective:       Patient ID:  Gerda Lai is a 84 y.o. female who presents for   Chief Complaint   Patient presents with    Wound Check     Pt presents for wound check.  Has skin cancer surgery left medial knee 3 months ago.  Was still causing a significant amount of pain last week.  Went to ER.  X-rayed.  Came back as arthritis.  tx with meloxicam.  Helped significantly. Wanted to keep this appt just in case. No pain to scar itself.   C/o scaly area on right 4th digit. Present for several weeks. Not bleeding. No tx.  Mild in severity.   C/o rash on right shoulder. Comes and goes. Itches. Mild in severity.       Wound Check         Review of Systems   Constitutional: Negative for fever and chills.   Skin: Negative for tendency to form keloidal scars.   Hematologic/Lymphatic: Does not bruise/bleed easily.        Objective:    Physical Exam   Constitutional: She appears well-developed and well-nourished. No distress.   Neurological: She is alert and oriented to person, place, and time. She is not disoriented.   Psychiatric: She has a normal mood and affect.   Skin:   Areas Examined (abnormalities noted in diagram):   Head / Face Inspection Performed  Neck Inspection Performed  LLE Inspection Performed  Nails and Digits Inspection Performed                       Diagram Legend     Erythematous scaling macule/papule c/w actinic keratosis       Vascular papule c/w angioma      Pigmented verrucoid papule/plaque c/w seborrheic keratosis      Yellow umbilicated papule c/w sebaceous hyperplasia      Irregularly shaped tan macule c/w lentigo     1-2 mm smooth white papules consistent with Milia      Movable subcutaneous cyst with punctum c/w epidermal inclusion cyst      Subcutaneous movable cyst c/w pilar cyst      Firm pink to brown papule c/w dermatofibroma      Pedunculated fleshy papule(s) c/w skin tag(s)      Evenly pigmented macule c/w junctional nevus     Mildly variegated pigmented, slightly  irregular-bordered macule c/w mildly atypical nevus      Flesh colored to evenly pigmented papule c/w intradermal nevus       Pink pearly papule/plaque c/w basal cell carcinoma      Erythematous hyperkeratotic cursted plaque c/w SCC      Surgical scar with no sign of skin cancer recurrence      Open and closed comedones      Inflammatory papules and pustules      Verrucoid papule consistent consistent with wart     Erythematous eczematous patches and plaques     Dystrophic onycholytic nail with subungual debris c/w onychomycosis     Umbilicated papule    Erythematous-base heme-crusted tan verrucoid plaque consistent with inflamed seborrheic keratosis     Erythematous Silvery Scaling Plaque c/w Psoriasis     See annotation      Assessment / Plan:        Nummular eczema  Good skin care regimen discussed including limiting to one bath or shower/day, using lukewarm water with mild soap and moisturizing cream to skin 1 - 2x/day. Brochure was provided and reviewed with patient.    -     triamcinolone acetonide 0.1% (KENALOG) 0.1 % cream; AAA bid for itching; not more than 2 weeks straight in same location  Dispense: 60 g; Refill: 3    AK (actinic keratosis)  Cryosurgery Procedure Note    Verbal consent from the patient is obtained and the patient is aware of the precancerous quality and need for treatment of these lesions. Liquid nitrogen cryosurgery is applied to the 2 actinic keratoses, as detailed in the physical exam, to produce a freeze injury. The patient is aware that blisters may form and is instructed on wound care with gentle cleansing and use of vaseline ointment to keep moist until healed. The patient is supplied a handout on cryosurgery and is instructed to call if lesions do not completely resolve.    SK (seborrheic keratosis)  These are benign inherited growths without a malignant potential. Reassurance given to patient. No treatment is necessary.     Scar- suspected suture granuloma medially  Massage  demonstrated to pt  If not improved will do punch excision              Return in about 6 months (around 5/22/2018).

## 2017-11-24 ENCOUNTER — CLINICAL SUPPORT (OUTPATIENT)
Dept: REHABILITATION | Facility: HOSPITAL | Age: 82
End: 2017-11-24
Payer: MEDICARE

## 2017-11-24 DIAGNOSIS — M75.81 RIGHT ROTATOR CUFF TENDINITIS: ICD-10-CM

## 2017-11-24 DIAGNOSIS — M25.511 CHRONIC PAIN OF BOTH SHOULDERS: ICD-10-CM

## 2017-11-24 DIAGNOSIS — M25.512 CHRONIC PAIN OF BOTH SHOULDERS: ICD-10-CM

## 2017-11-24 DIAGNOSIS — G89.29 CHRONIC PAIN OF BOTH SHOULDERS: ICD-10-CM

## 2017-11-24 DIAGNOSIS — M25.611 DECREASED ROM OF RIGHT SHOULDER: ICD-10-CM

## 2017-11-24 PROCEDURE — 97110 THERAPEUTIC EXERCISES: CPT | Mod: PO

## 2017-11-24 NOTE — PROGRESS NOTES
"                                                    Physical Therapy Daily Note   Name: Gerda Lai  Clinic Number: [id number]    Evaluation Date: 11/22/2017  Visit #: 4 / 20  Authorization period Expiration: 12/31/2017  Plan of Care Expiration: 2/10/2018  Precautions: standard    Time In: 2:10 PM  Time Out: 2:55 PM  Total 1:1 Treatment Time: 30 min      Diagnosis:   Encounter Diagnoses   Name Primary?    Decreased ROM of right shoulder     Right rotator cuff tendinitis     Chronic pain of both shoulders      Physician: Cesar Burroughs MD  Treatment Orders: PT Eval and Treat    Subjective   Pt reports: Pt cont to have R shoulder pain, "it's so weak that I'm afraid to lift anything with that arm."    Pain Scale:  Not reported today      Objective     VITALS:  Time BP HR Comments   2:28 PM  165 / 59 61 bpm    2:30  / 72 60 bpm    2:50  / 73 60 bpm      Discussed Plan of Care with patient: Yes    Gerda received 40 minutes of therapeutic exercise including:     UBE 2' forward/2' backwards  PROM RUE as tolerated  Seated Chin tucks x 20  Pulleys AAROM x 3 min  Scap retraction x 20  Stretching 2 x 30 sec each: UT, pec low door stretch, ER stretch at wall    DID NOT PERFORM: Supine wand AAROM 2 x 10  AAROM wall walks x 10  Scapular retraction with OTB x 20 5 sec  Shoulder extension with OTB x 20 5 sec        Gerda received 5 minutes of manual therapy including:  AP glides to R G-H joint Grade 2      Written Home Exercises Provided: Patient educated to continue with previously issued HEP.  Exercises were reviewed and Gerda was able to demonstrate them prior to the end of the session. Pt received a written copy of exercises to perform at home. Gerda demonstrated good  understanding of the education provided.     Assessment   Pt had dizziness with pec door stretch that was low intensity but intermittent throughout remaining treatment. Pt had eaten lunch prior to visit and reported being sufficiently " hydrated, but dizziness most likely due to being on multiple medications complicated by frequent change in positions in PT as well as pt being stress about arriving late to PT today. Will cont to monitor and resume therex only as tolerated.  Gerda is progressing well towards her goals. Will benefit from con't skilled care.    Anticipated barriers to physical therapy: none  Pt's spiritual, cultural and educational needs considered and pt agreeable to plan of care and goals.    Plan   Continue with established Plan of Care towards PT goals. Resume and progress therex as tolerated      Neela Barroso, PT

## 2017-11-27 ENCOUNTER — CLINICAL SUPPORT (OUTPATIENT)
Dept: REHABILITATION | Facility: HOSPITAL | Age: 82
End: 2017-11-27
Payer: MEDICARE

## 2017-11-27 ENCOUNTER — OUTPATIENT CASE MANAGEMENT (OUTPATIENT)
Dept: ADMINISTRATIVE | Facility: OTHER | Age: 82
End: 2017-11-27

## 2017-11-27 DIAGNOSIS — M25.511 CHRONIC PAIN OF BOTH SHOULDERS: ICD-10-CM

## 2017-11-27 DIAGNOSIS — M25.512 CHRONIC PAIN OF BOTH SHOULDERS: ICD-10-CM

## 2017-11-27 DIAGNOSIS — M75.81 RIGHT ROTATOR CUFF TENDINITIS: ICD-10-CM

## 2017-11-27 DIAGNOSIS — G89.29 CHRONIC PAIN OF BOTH SHOULDERS: ICD-10-CM

## 2017-11-27 DIAGNOSIS — M25.611 DECREASED ROM OF RIGHT SHOULDER: ICD-10-CM

## 2017-11-27 PROCEDURE — 97110 THERAPEUTIC EXERCISES: CPT | Mod: PO

## 2017-11-27 NOTE — LETTER
November 28, 2017    Gerda Lai  [address] [facility] [place] [address]             [facility] [address] [place] [address] Dear Ms. Lai:    Thank you for taking my call and discussing case management with me.  We understand that receiving many services from different doctors and healthcare providers is overwhelming. There are appointments to make, transportation to arrange, dietary instructions to understand, and new medications to obtain.    This is whereI can help. This best thing about this service is there is no charge for this support.     We can do this over the phone as to not conflict with any other appointments you have scheduled with home health and such.    My goal is to help you manage your health condition(s) safely within your living environment, whether that is your home or a medical facility. I want to help you function at the healthiest and highest level possible.   I have enclosed some information on Pain management for your review.    I have enclosed my contact information and information on OPCM services.  I am available Monday through Wednesday 8- 4:30 pm.   If you have any questions or concerns, please don't hesitate to call me at [phone] or the office at [phone].    I will be contacting you periodically for the next 2 months to assist with your care needs.    Sincerely,        Kristie Mancuso RN,[facility]  Outpatient Complex Case Management  [phone]

## 2017-11-27 NOTE — PATIENT INSTRUCTIONS
"  Managing Chronic Pain: Therapies for Mind and Body    Both the brain and the body are involved in the pain response. The brain "reads" and "interprets" the pain signals from the body. This means that your mind has some control over how pain signals are processed. Mind-body therapies may help change how your brain reads pain signals. Talk to your health care provider about trying 1 or more of the therapies listed below.  [place] therapies  · Yoga is a mind and body practice that combines physical postures, breathing techniques, and meditation or relaxation. It can help you relax your body and mind and become more flexible. Ed Chi is a gentle form of exercise with movements that help gain strength and flexibility.  · Relaxation, visualization, and meditation are methods for relaxing muscles and concentrating on something outside of your body. This can lessen the feeling of pain or help you work through it.  · Coping skills are ways of feeling more in control. These use humor, distraction, or positive thinking to put the pain in its place.  · Biofeedback is a technique for learning to have more control over your body. This can help you better control your response to pain.  · Self-hypnosis is a way to train your mind to change your perception of pain.  Counseling and support groups  · Chronic pain support groups can help you feel less isolated. They can also give you tips for coping with pain.  · Support groups for an underlying condition can help you learn more about controlling that condition and the pain that it causes.  · Individual counseling can help you learn coping skills and methods, like visualization and relaxation. Counseling can also help with mood problems. Working with a counselor does not mean that you're mentally ill.  Complementary therapies  · Massage helps you relax. It may also help relieve some kinds of muscle and joint pain.  · Acupuncture and acupressure are treatments in which small " needles or pressure is applied to certain sites on the body. They may stimulate the body's natural pain-control system.  · Chiropractic is a treatment based on adjustments made to the spine and joints. It may help with some kinds of back pain.  · Certain vitamins or herbs may help with some conditions that cause chronic pain, although there is limited evidence for their effectiveness. But they may interact with your medicines, so check with your health care provider or pharmacist before you try them.  Date Last Reviewed: 6/15/2015  © 8905-5887 [employer]. [address] [place] [address]. All rights reserved. This information is not intended as a substitute for professional medical care. Always follow your healthcare professional's instructions.        Complementary Care for Pain  You may find pain relief with complementary care. Look for a licensed or certified professional. And always tell your healthcare professional that you are using complementary care.    Massage  Massage can increase circulation and relaxation. This can help relieve stress and pain.  Biofeedback  Biofeedback uses instruments to measure the body's physiological activity, like heart rate and muscle activity. This information is used to help you learn to control certain functions, such as relaxing muscles and helping reduce pain.   Chiropractic  Chiropractic adjusts the spine and joints. It may help reduce back, neck, or joint pain. Chiropractic may also use mild electrical stimulation, massage, heat, or ultrasound (sound waves).  Acupuncture  Acupuncture uses thin needles to help treat pain. The treatment may release the bodys own painkillers.  Distraction  Distraction helps you focus on something besides pain. Try reading a book, watching a movie, or talking with family. Or visit a local attraction.  Meditation  Meditation helps you focus on words, objects, or ideas. Doing this can calm you and decrease  stress.  Relaxation  Relaxation includes methods like listening to soothing music or relaxation tapes. You might try slow, deep breathing. Imagine a calm scene, like an ocean or mountain, as you breathe.  Date Last Reviewed: 5/1/2017  © 2012-0375 [employer]. [address] [place] [address], [place] [address]. All rights reserved. This information is not intended as a substitute for professional medical care. Always follow your healthcare professional's instructions.        Arthritis: Exercise     Look for exercise classes for arthritis in your community.     Exercise is important to your overall health. It is especially important in people with arthritis. Regular exercise can:  · Keep your heart and blood vessels healthy  · Help with weight management, or weight loss  · Improve your mood  · Help prevent and manage health problems such as:  ¨ Diabetes  ¨ High blood pressure  ¨ High cholesterol  ¨ Depression  In people with arthritis, it offers all of those benefits and it can:  · Lessen pain and stiffness  · Strengthen muscles that support your joints  · Help you to be able to do the things you enjoy  Exercise and arthritis  Exercise is an important part of any arthritis treatment plan. A complete program consists of the following three types of exercises:  · Aerobic exercises for cardiovascular health and overall fitness.   · Strengthening exercises to build up muscles to help prevent injury and keep joints stable.  · Range-of-motion exercises to keep muscles and joints flexible.  Getting started  Talk with your healthcare provider about what is safe for you. Make sure you:  · Learn how to do exercises properly and safely. Consider talking with a physical therapist or  used to working with people with arthritis.  · Start gradually and build. If you haven't been exercising, start slowly. Don't exercise too hard or too long.  · Create a routine. Set aside specific times for exercise every day.  · Warm up  carefully. Take 5 to 10 minutes at the beginning and end of exercising to warm up and cool down. Just do the same exercises at a slower pace for 5 to 10 minutes.  · Work at a comfortable, smooth pace. Move your joints gently to prevent injury.  · Pay attention to your body. Don't exercise a painful or swollen joint; switch to another activity. Follow the 2-hour pain rule: You did too much if your joint or muscle pain lasts 2 hours or more after exercising, or is worse the next day. This doesn't mean you should stop exercising. Just do less.  Aerobic exercise  Aerobic exercise improves overall health and helps control weight. Choose those that don't add extra stress to your joints. For example, walking, swimming, or bicycling.  Most people should exercise for at least 30 minutes. most days of the week. You don't have to exercise all at once. Try exercising for 10 minutes, 3 times a day, for example.  Strengthening exercises  Strengthening your muscles help to protect your joints and prevent injuries. Try to do strengthening exercises 2 to 3 times a week:  · These exercises can be done with exercise or resistance bands (inexpensive exercise aids that add resistance), or with light weights. Some people use soup cans as weights.  · Isometric exercises are done by tightening the muscles without moving the joint. This may be a good way to strengthen the muscles around a stiff joint.  A physical therapist or  can teach you how to do these exercises.  Range-of-motion (ROM) exercises  Range-of-motion (ROM) exercises allow you to move each of your joints in every way they are intended to move. You should do ROM exercises for each joint 2 to 3 times a day. This will help you maintain full use of all of your joints.  Sample ROM exercises  The following are just a sample of ROM exercises--one for your neck, shoulders, elbows, hips, knees, and ankles. To completely move each joint through its full range of motion, you will  have to do a few exercises for each joint. A physical therapist or  can teach you how to do full ROM exercises for each joint.   Repeat each for these exercises 5 to 10 times. Make sure you move slowly:  1. Neck turns. Sit in a straight-backed chair. Look straight ahead. Slowly turn your head to the right, then return it to center. Repeat. Do the same thing, turning your head to the left. Repeat.  2. Shoulder raise. Lie on your back or sit in a chair. Raise one arm over your head, keeping your elbow straight. Keep the arm close to your ear. Return it slowly to your side. Repeat with your other arm.  3. Elbow stretch. Sit in a chair. If you are able, put both arms out to your sides to form a T. Slowly touch your shoulders with the tips of your fingers. Then return to the T-position. Repeat.  4. Hip stretch. Lie on your back with your legs straight and about 6 inches apart. With your foot flexed, slide your leg out to the side, then slide it back to the starting position. Repeat with your other leg.  5. Knee bend. Sit in a chair with your legs bent at the knees in front of you. Straighten one leg as much as you can, then bring it back to the floor. Repeat this 5 to 10 times. Then do the same thing with the other leg.   6. Ankle stretch. Sit with your feet flat on the floor. Lift your toes off of the floor while your heels stay down. Repeat. Then lift your heels off the floor while your toes stay down. Repeat.  Other exercise  Many other exercise and activities benefit people with arthritis. It is most important to find exercise and activities that you enjoy. You might try:  · Yoga, including chair yoga, helps to keep your joints strong and flexible.   · Ed Chi, an ancient type of exercise with slow, gentle movements  · Water exercise, including water walking  For more information on exercise for arthritis go to the Arthritis Foundation website: www.arthritis.org.  Date Last Reviewed: 2/14/2016  © 9618-5758 [employer]. [address] [place] [address], [place] [address]. All rights reserved. This information is not intended as a substitute for professional medical care. Always follow your healthcare professional's instructions.        Osteoarthritis: Natural and Alternative Treatments     Therapeutic massage is one alternative treatment option.   The treatment for osteoarthritis includes lifestyle changes like weight loss and exercise. Medicines and surgery may also be part of the treatment. There are also many natural and alternative treatments. These treatments may also help relieve pain and stiffness caused by osteoarthritis.  Heat and cold  Using heat and cold treatments are simple ways to lessen arthritis symptoms:  · Heat soothes stiff joints and tired muscles. Heat works well before exercise, for example. Heat treatments include:  ¨ A warm shower or baths, or soak (for example, fill the sink with warm water and move your fingers, hands, and wrists around in the water)  ¨ A moist heating pad  ¨ A warm, moist wash cloth  ¨ An electric blanket or throw  · Cold treatments help to numb painful areas and decrease swelling. Cold treatments include the following wrapped in a thin towel:  ¨ An ice pack or bag of ice  ¨ A gel-filled cold pack  ¨ A bag of frozen vegetables, like peas or corn  Be careful when using heat or cold. You can injure your skin. Each treatment should only last for 10 to 20 minutes. Your healthcare provider or therapist can give you specific instructions.     Meditation and relaxation  Meditation and relaxation can help you deal with arthritis pain. There are many different methods available including deep breathing exercises, meditation, and yoga. Look for information and programs on the Internet or in your community. Or try this simple deep breathing technique sometimes called belly breathin. Sit in a comfortable chair or lie on your back.   2. Put one hand on your chest and the other hand  on your stomach.  3. Take a breath in through your nose. The hand on your stomach should rise. The hand on your chest should move very little.  4. Breathe out through your mouth, pushing out as much air as you can. The hand on your stomach should move in as you breathe out, but the hand on your chest should move very little. You should feel the muscles of your stomach tighten.   5. Continue to breathe in through your nose and out through your mouth. You should feel your stomach rise and fall. Count slowly each time you breathe out.  Acupuncture  Acupuncture is a 2000-year-old practice. Practitioners insert thin needles in specific parts of the body. Research shows that it can help to relieve the pain of arthritis.   For more information or to find a practitioner in your area, contact the American Academy of Medical Acupuncture. Its website is: http://www.medicalacupuncture.org/.  Massage  Therapeutic massage has many benefits. It may:  · Help you and your muscles relax  · Improve blood flow to muscles and joints  · Help joints stay more flexible.  Look for a certified massage therapist. Many are trained to treat sore muscles and joint pain and stiffness.  Vitamins, supplements, and herbs  People with arthritis, or other long-term conditions that cause pain, often look for alternative ways to lessen pain. Vitamins, supplements, and herbs may or may not help you to feel better. Before you try any vitamin, supplement, or herb, make sure you ask your healthcare provider or pharmacist.  Physical therapy/occupational therapy  · Evaluation by a physical therapist and or occupational therapist for assessment for limitations in activities of daily living  · Assistance with developing an appropriate exercise routine for both muscle strengthening and cardiovascular health  Weight management  · Studies have demonstrated that weight loss in overweight individuals can improve osteoarthritis symptoms  · Talk with your healthcare  provider regarding your optimal weight and techniques for weight management if necessary.   Psychological treatments  Research shows that many psychological therapies or those that deal with thinking and emotions, help people cope with arthritis pain. Therapies include: cognitive-behavioral therapy (CBT), pain coping skills training, biofeedback, stress management, and hypnosis. Ask your healthcare provider for more information about these therapies.  For more information about many of these methods, contact the National Center for Complementary and Alternative Medicine at http://www.[facility].nih.gov.  Date Last Reviewed: 2/14/2016 © 2000-2017 [employer]. [address] [place] [id number]. All rights reserved. This information is not intended as a substitute for professional medical care. Always follow your healthcare professional's instructions.

## 2017-11-27 NOTE — PROGRESS NOTES
"                                                    Physical Therapy Daily Note   Name: Gerda Lai  Clinic Number: [id number]    Evaluation Date: 11/22/2017  Visit #: 5 / 20  Authorization period Expiration: 12/31/2017  Plan of Care Expiration: 2/10/2018  Precautions: standard    Time In: 3:10 PM  Time Out: 3:55 PM  Total 1:1 Treatment Time: 40 min    Diagnosis:   Encounter Diagnoses   Name Primary?    Decreased ROM of right shoulder     Right rotator cuff tendinitis     Chronic pain of both shoulders      Physician: Cesar Burroughs MD  Treatment Orders: PT Eval and Treat    Subjective   Pt reports: R shoulder feeling "not bad" today.    Pain Scale: 1/10      Objective     VITALS:  Time BP HR Comments   3:15 /65 63 bpm    3:37 /67 65 bpm            Discussed Plan of Care with patient: Yes    Gerda received 40 minutes of therapeutic exercise including:     UBE 2' forward/2' backwards  Pulleys AAROM x 3 min  Seated Chin tucks x 20, max cueing  Scap retraction x 20  Stretching 2 x 30 sec each: UT, pec low door stretch, ER stretch at wall  PROM RUE as tolerated  Supine wand AAROM 2 x 10  Scapular retraction with OTB x 20 5 sec  Shoulder extension with OTB x 20 5 sec        Gerda received 5 minutes of manual therapy including:  AP glides to R G-H joint Grade 2      Written Home Exercises Provided: Patient educated to continue with previously issued HEP.  Exercises were reviewed and Gerda was able to demonstrate them prior to the end of the session. Pt received a written copy of exercises to perform at home. Gerda demonstrated good  understanding of the education provided.     Assessment   Increased pain with UBE but improved with rest. Tolerated other therex well without any onset of dizziness and no increase in pain by end of visit. PROM is improving.  Gerda is progressing well towards her goals. Will benefit from con't skilled care.    Anticipated barriers to physical therapy: none  Pt's " spiritual, cultural and educational needs considered and pt agreeable to plan of care and goals.    Plan   Continue with established Plan of Care towards PT goals. Resume and progress therex as tolerated      Neela Barroso, PT

## 2017-11-30 ENCOUNTER — CLINICAL SUPPORT (OUTPATIENT)
Dept: REHABILITATION | Facility: HOSPITAL | Age: 82
End: 2017-11-30
Payer: MEDICARE

## 2017-11-30 DIAGNOSIS — M25.611 DECREASED ROM OF RIGHT SHOULDER: ICD-10-CM

## 2017-11-30 DIAGNOSIS — M25.511 CHRONIC PAIN OF BOTH SHOULDERS: ICD-10-CM

## 2017-11-30 DIAGNOSIS — M75.81 RIGHT ROTATOR CUFF TENDINITIS: ICD-10-CM

## 2017-11-30 DIAGNOSIS — M25.512 CHRONIC PAIN OF BOTH SHOULDERS: ICD-10-CM

## 2017-11-30 DIAGNOSIS — G89.29 CHRONIC PAIN OF BOTH SHOULDERS: ICD-10-CM

## 2017-11-30 PROCEDURE — 97110 THERAPEUTIC EXERCISES: CPT | Mod: PO

## 2017-11-30 NOTE — PROGRESS NOTES
"                                                    Physical Therapy Daily Note   Name: Gerda Lai  Clinic Number: [id number]    Evaluation Date: 11/22/2017  Visit #: 6 / 20  Authorization period Expiration: 12/31/2017  Plan of Care Expiration: 2/10/2018  Precautions: standard    Time In: 1:10 PM  Time Out: 1:40 PM  Total 1:1 Treatment Time: 30 min    Diagnosis:   Encounter Diagnoses   Name Primary?    Decreased ROM of right shoulder     Right rotator cuff tendinitis     Chronic pain of both shoulders      Physician: Cesar Burroughs MD  Treatment Orders: PT Eval and Treat    Subjective   Pt reports: Taking Meloxicam for knee pain and not really helping and "side effects are really scary so I am not going to take it anymore."    Pain Scale: 1/10      Objective     VITALS:  Time BP HR Comments   1:11 /70 54    1:12 /85 55    1:32 /74 61      Discussed Plan of Care with patient: Yes    Gerda received 30 minutes of therapeutic exercise including:     UBE 2' forward/2' backwards --NP  Pulleys AAROM x 3 min  Seated Chin tucks x 20, max cueing  Scap retraction x 20  Stretching 2 x 30 sec each: UT, pec low door stretch, ER stretch at wall  DID NOT PERFORM: PROM RUE as tolerated  Supine wand AAROM 2 x 10  Scapular retraction with OTB x 20 5 sec  Shoulder extension with OTB x 20 5 sec    Written Home Exercises Provided: Patient educated to continue with previously issued HEP.  Exercises were reviewed and Gerda was able to demonstrate them prior to the end of the session. Pt received a written copy of exercises to perform at home. Gerda demonstrated good  understanding of the education provided.     Assessment   Treatment was discontinued today due to high blood pressure and was not recommended to perform PRE's. Advised pt to contact MD to discuss side effects of meds and weaning off.  Gerda is progressing well towards her goals. Will benefit from con't skilled care.    Anticipated barriers to " physical therapy: none  Pt's spiritual, cultural and educational needs considered and pt agreeable to plan of care and goals.    Plan   Continue with established Plan of Care towards PT goals. Resume and progress therex as tolerated      Neela Barroso, PT

## 2017-12-01 ENCOUNTER — NURSE TRIAGE (OUTPATIENT)
Dept: ADMINISTRATIVE | Facility: CLINIC | Age: 82
End: 2017-12-01

## 2017-12-01 NOTE — TELEPHONE ENCOUNTER
Reason for Disposition   Caller has NON-URGENT medication question about med that PCP prescribed and triager unable to answer question    Protocols used: ST MEDICATION QUESTION CALL-A-AH    Pt believes that she is having a reaction to the Mobic she is taking. She reports that her stomach has been silghtly messed up and yesterday she had some swelling to her ankles. She did not take the medicine yesterday and this morning woke up with no swelling and her stomach is feeling better. She was calling to let Dr. Sellers know that she is not going to take the medicine anymore.     Pt would like to speak with someone in PCP office- please contact her to advise

## 2017-12-01 NOTE — TELEPHONE ENCOUNTER
Message left on voicemail informing patient that Dr. Sellers agrees with her decision to stop taking Mobic and to contact office with any questions.

## 2017-12-04 ENCOUNTER — OFFICE VISIT (OUTPATIENT)
Dept: ORTHOPEDICS | Facility: CLINIC | Age: 82
End: 2017-12-04
Payer: MEDICARE

## 2017-12-04 ENCOUNTER — DOCUMENTATION ONLY (OUTPATIENT)
Dept: REHABILITATION | Facility: HOSPITAL | Age: 82
End: 2017-12-04

## 2017-12-04 VITALS — WEIGHT: 147.69 LBS | BODY MASS INDEX: 26.17 KG/M2 | HEIGHT: 63 IN

## 2017-12-04 DIAGNOSIS — M70.62 TROCHANTERIC BURSITIS OF LEFT HIP: ICD-10-CM

## 2017-12-04 DIAGNOSIS — M17.12 PRIMARY OSTEOARTHRITIS OF LEFT KNEE: Primary | ICD-10-CM

## 2017-12-04 PROCEDURE — 99213 OFFICE O/P EST LOW 20 MIN: CPT | Mod: 25,S$PBB,, | Performed by: PHYSICIAN ASSISTANT

## 2017-12-04 PROCEDURE — 99213 OFFICE O/P EST LOW 20 MIN: CPT | Mod: PBBFAC | Performed by: PHYSICIAN ASSISTANT

## 2017-12-04 PROCEDURE — 20610 DRAIN/INJ JOINT/BURSA W/O US: CPT | Mod: PBBFAC | Performed by: PHYSICIAN ASSISTANT

## 2017-12-04 PROCEDURE — 99999 PR PBB SHADOW E&M-EST. PATIENT-LVL III: CPT | Mod: PBBFAC,,, | Performed by: PHYSICIAN ASSISTANT

## 2017-12-04 PROCEDURE — 20610 DRAIN/INJ JOINT/BURSA W/O US: CPT | Mod: S$PBB,LT,, | Performed by: PHYSICIAN ASSISTANT

## 2017-12-04 RX ORDER — TRIAMCINOLONE ACETONIDE 40 MG/ML
40 INJECTION, SUSPENSION INTRA-ARTICULAR; INTRAMUSCULAR
Status: COMPLETED | OUTPATIENT
Start: 2017-12-04 | End: 2017-12-04

## 2017-12-04 RX ADMIN — TRIAMCINOLONE ACETONIDE 40 MG: 40 INJECTION, SUSPENSION INTRA-ARTICULAR; INTRAMUSCULAR at 04:12

## 2017-12-04 NOTE — PROGRESS NOTES
"Pt arrived to PT appt today and reported she "had a bad morning with high blood pressure." Vitals measured at arrival were:  VITALS:  Time BP HR Comments   2:10 PM  172 / 80     2:15  / 90 52 bpm      PT appt was canceled due to hypertension and pt said she would go home to take additional Labetalol and will call clinic to inform if blood pressure decreases at all. Advised pt to contact PCP and pt declined.  Pt called at 4:30 PM and stated blood pressure had decreased to 138/53 after taking her meds.  "

## 2017-12-04 NOTE — PROGRESS NOTES
Subjective:      Patient ID: Gerda Lai is a 84 y.o. female.    Chief Complaint: Pain of the Left Knee    HPI  84 year old female presents with chief complaint of left hip and left knee pain x couple of weeks. She denies trauma. Pain is at the lateral hip and it radiates to the lateral knee. She also has medial left knee pain. It is worse with walking. She tried mobic but it caused swelling. She has h/o trochanteric bursitis and received injection in the past for it. She does not use assistive devices.   Review of Systems   Constitution: Negative for chills, fever and night sweats.   Cardiovascular: Negative for chest pain.   Respiratory: Negative for cough and shortness of breath.    Hematologic/Lymphatic: Does not bruise/bleed easily.   Skin: Negative for color change.   Gastrointestinal: Negative for heartburn.   Genitourinary: Negative for dysuria.   Neurological: Negative for numbness and paresthesias.   Psychiatric/Behavioral: Negative for altered mental status.   Allergic/Immunologic: Negative for persistent infections.         Objective:            General    Vitals reviewed.  Constitutional: She is oriented to person, place, and time. She appears well-developed and well-nourished.   Cardiovascular: Normal rate.    Neurological: She is alert and oriented to person, place, and time.         Left Hip Exam     Tenderness   The patient tender to palpation of the trochanteric bursa.    Range of Motion   The patient has normal left hip ROM.            Left Knee Exam:  ROM 0-120 degrees  No warmth or erythema  Small effusion  TTP medial joint line      X-ray knee: reviewed by myself. DJD present.       Assessment:       Encounter Diagnoses   Name Primary?    Primary osteoarthritis of left knee Yes    Trochanteric bursitis of left hip           Plan:       Discussed treatment options with patient. She would like knee and hip injections today. Apply ice. Elevate knee. RTC prn.     PROCEDURE:  I have explained  the risks, benefits, and alternatives of the procedure in detail.  The patient voices understanding and all questions have been answered.  The patient agrees to proceed as planned. So after I performed a sterile pre of the skin in the normal fashion the left greater trochanteric area is injected from the lateral approach using an 2 inch 22 gauge needle with a combination of 4cc 1% lidocaine and 40 mg of kenalog.  The patient is cautioned and immediate relief of pain is secondary to the local anesthetic and will be temporary.  After the anesthetic wears off there may be a increase in pain that may last for a few hours or a few days and they should use ice to help alleviate this flair up of pain.     PROCEDURE:  I have explained the risks, benefits, and alternatives of the procedure in detail.  The patient voices understanding and all questions have been answered.  The patient agrees to proceed as planned. So after I performed a sterile prep of the skin in the normal fashion the left knee is injected using a 22 gauge needle from the anteromedial approach with a combination of 4cc 1% plain lidocaine and 40 mg of kenalog.  The patient is cautioned and immediate relief of pain is secondary to the local anesthetic and will be temporary.  After the anesthetic wears off there may be a increase in pain that may last for a few hours or a few days and they should use ice to help alleviate this flair up of pain.

## 2017-12-07 ENCOUNTER — CLINICAL SUPPORT (OUTPATIENT)
Dept: REHABILITATION | Facility: HOSPITAL | Age: 82
End: 2017-12-07
Payer: MEDICARE

## 2017-12-07 DIAGNOSIS — M25.611 DECREASED ROM OF RIGHT SHOULDER: ICD-10-CM

## 2017-12-07 DIAGNOSIS — M75.81 RIGHT ROTATOR CUFF TENDINITIS: ICD-10-CM

## 2017-12-07 PROCEDURE — 97110 THERAPEUTIC EXERCISES: CPT | Mod: PO

## 2017-12-07 NOTE — PROGRESS NOTES
Physical Therapy Daily Note   Name: Gerda Lai  Clinic Number: [id number]    Evaluation Date: 11/22/2017  Visit #: 7 / 20  Authorization period Expiration: 12/31/2017  Plan of Care Expiration: 2/10/2018  Precautions: standard    Time In: 1:05 PM  Time Out: 1:57 PM  Total 1:1 Treatment Time: 26 min    Diagnosis:   Encounter Diagnoses   Name Primary?    Decreased ROM of right shoulder     Right rotator cuff tendinitis      Physician: Cesar Burroughs MD  Treatment Orders: PT Eval and Treat    Subjective     Pt reports: Pt received an injection in the L knee and L hip.  Pt reports she felt almost immediately relief in both areas.    Pain Scale: 0/10      Objective     VITALS:  Time BP HR Comments   1:10 /74     1:30PM 123/63     1:55 /70         Gerda received 52 minutes of therapeutic exercise including:     UBE 2' forward/2' backwards --NP  Pulleys AAROM x 3 min  Seated Chin tucks x 20, max cueing  Scap retraction x 20  Stretching 2 x 30 sec each: UT, pec low door stretch, ER stretch at wall  Supine wand AAROM 2 x 10  Scapular retraction with OTB x 20 5 sec  Shoulder extension with OTB x 20 5 sec  Walk outs IR/ER with YTB x 10    Pt received 1 on 1 therapeutic exercise for 26 minutes    Written Home Exercises Provided: Patient educated to continue with previously issued HEP.  Exercises were reviewed and Gerda was able to demonstrate them prior to the end of the session. Pt received a written copy of exercises to perform at home. Gerda demonstrated good  understanding of the education provided.     Assessment     Pt tolerated treatment well with slight soreness in the R shoulder following treatment.  Pt reported that her 2nd blood pressure number was low for her and she had one episode of dizziness when coming up from lying supine.  Pt reports she might make an appointment with her doctor because of her blood pressure.  Gerda is progressing well  towards her goals. Will benefit from con't skilled care.    Anticipated barriers to physical therapy: none  Pt's spiritual, cultural and educational needs considered and pt agreeable to plan of care and goals.    Plan   Continue with established Plan of Care towards PT goals. Resume and progress therex as tolerated

## 2017-12-08 ENCOUNTER — TELEPHONE (OUTPATIENT)
Dept: ORTHOPEDICS | Facility: CLINIC | Age: 82
End: 2017-12-08

## 2017-12-11 ENCOUNTER — OUTPATIENT CASE MANAGEMENT (OUTPATIENT)
Dept: ADMINISTRATIVE | Facility: OTHER | Age: 82
End: 2017-12-11

## 2017-12-11 ENCOUNTER — CLINICAL SUPPORT (OUTPATIENT)
Dept: REHABILITATION | Facility: HOSPITAL | Age: 82
End: 2017-12-11
Payer: MEDICARE

## 2017-12-11 DIAGNOSIS — G89.29 CHRONIC PAIN OF BOTH SHOULDERS: ICD-10-CM

## 2017-12-11 DIAGNOSIS — M25.512 CHRONIC PAIN OF BOTH SHOULDERS: ICD-10-CM

## 2017-12-11 DIAGNOSIS — M75.81 RIGHT ROTATOR CUFF TENDINITIS: ICD-10-CM

## 2017-12-11 DIAGNOSIS — M25.611 DECREASED ROM OF RIGHT SHOULDER: ICD-10-CM

## 2017-12-11 DIAGNOSIS — M25.511 CHRONIC PAIN OF BOTH SHOULDERS: ICD-10-CM

## 2017-12-11 PROCEDURE — 97110 THERAPEUTIC EXERCISES: CPT | Mod: PO

## 2017-12-11 PROCEDURE — G8985 CARRY GOAL STATUS: HCPCS | Mod: CJ,PO

## 2017-12-11 PROCEDURE — G8986 CARRY D/C STATUS: HCPCS | Mod: CJ,PO

## 2017-12-11 NOTE — PROGRESS NOTES
12/11/17  Chart reviewed in [facility] and no new ed or hospitalizations noted    Progress towards goals:    LT goal 1. Patient/caregiver will accept life style changes to manage and improve symptoms of arthritis prior to discharge from [facility]. - Priority: high    ST goal 1Patient/caregiver will verbalize 1 noninvasive pain relief measures to help manage the pain within 2 weeks.  Part met  · Pt is attending her therapy as indicated and she has improvement in shoulder pain and rom but her hip and knee are now a problem.  She saw ortho last week and got an injection which worked at the time but has since worn off.  She states pain in hip is 7.  She cannot take the mobic due to gastric upset and is taking tylenol.  She states she has received the information I sent in the mail.  She admits she tries to use meditation with little success.  I encouraged distraction and she states she will try.        She sent message to vizcarra the ortho pa and plans to call office about possible pt for the pain in knee since the ice and elevation is not working    Patient/caregiver will verbalize 1 strategies to manage fatigue within 1 month.  Part met   Reviewed eating and nutrition and pt Diet recall  Lunch was turkey sandwich dinner was meatloaf and mashed potatoes.  Encouraged pt to increase fruit and vegetables in diet and will review again at next encounter  Advised I will call again in 3 weeks and she verbalized understanding         Plan  Follow up on receipt of education material  Review diet  Review exercise to improve mobility

## 2017-12-11 NOTE — PLAN OF CARE
"OUTPATIENT PHYSICAL THERAPY  PHYSICAL THERAPY RE-EVALUATION    Name: Gerda Lai  Clinic Number: [id number]    Diagnosis:   1. Decreased ROM of right shoulder     2. Right rotator cuff tendinitis     3. Chronic pain of both shoulders       Physician: Cesar Burroughs MD  Treatment Orders: PT Eval and Treat    Subjective   History of Present Illness: had carpets cleaned, slipped on wet floor, landing on rear end and R elbow. Previous RTC tear that has worsened since fall. Pt is RHD. Had injection 1 month ago which helped some but no recent improvement. C/o dizziness and low energy due to taking multiple medications. Since SOC, reports shoulder pain is "somewhat better." Pt has not been compliant with HEP due to variable blood pressure and associated SX as well as concurrent L hip/knee OA. Today blood pressure has been low and feeling dizzy. Reports L hip/knee pain re-exacerbated 3 days after injection. Reports she wants to start PT for LLE pain since injection was not helpful. Pt interested in holding PT to try aquatics at gym and build up physical activity tolerance before resuming PT. Pt is comfortable in sitting and not dizzy.   DOI: 3 months ago  Imaging, x-rays: Severe DJD with high riding right humeral head suggestive of rotator cuff pathology.  No fracture seen.  Pain, R neck/shoulder: current 0/10, worst 3/10, best 0/10, Aching, intermittent  Radicular symptoms: numbness to R 3rd/4th digits, worse when waking up  Aggravating factors: gardening. Carrying any object (including purse).  Easing factors: Tylenol every day  Pts goals: The patients goal is to return to PLOF without pain or risk of re-injury.    Objective   Mental status: alert and oriented x 3 and pleasant and cooperative  Posture/ Alignment: Poor, Protruded Head, Protracted Scapula    VITALS:  Time BP HR Comments   2:10 /58 62    2:30 /67 66              FUNCTION:   - Apley scratch: R T8, L T8  - Chin tuck: able to perform with " shoulder shrug/thoracic extension compensation  - Scap Retraction: compensates with shoulder elevation/thoracic extension  - Bed Mobility: supine-->long sit with max effort and provokes dizziness    ROM:   AROM Right Left Comment   Shoulder Flexion: 137 degrees 123 degrees    Shoulder Abduction: 155 degrees 106 degrees    PROM Right Left Comment   Shoulder Flexion: 163 degrees 142 degrees    Shoulder Abduction: 180 degrees 136 degrees    Shoulder ER, 90°ABD: 64* degrees 56* degrees guarding   Shoulder IR, 90° ABD: 66 degrees 90 degrees    *pain    Strength:    Right Left Comment   Shoulder flexion: 3/5 3-/5    Shoulder Abduction: 3/5 3-/5    Shoulder ER: 2/5 2/5    Shoulder IR: 3+/5 4/5      Special Tests:  - Painful Arc: L positive 100-->80 de.grees  - Neers: negative  - Damon-Liang: left positive  - Lift-off: negative  - ER Lag: negative  - Drop Arm: left positive  - Full/Empty Can: right positive  - Crank Test: negative  - Apprehension/Relocation: negative      Joint Play:  Hypomobility and guarding with all G-H mobs of RUE    Pt/family was provided educational information, including: role of PT, goals for PT, scheduling - pt verbalized understanding. Discussed insurance plan with pt.     Assessment   Gerda is a 84 y.o. female referred to outpatient physical therapy with a diagnosis of R shoulder pain and limited ROM due to fall landing on R elbow, chronic degenerative changes, and h/o bilateral RTC pathology. Demonstrates impairments including limitations as described in the problem list. Unable to participate fully in daily activities. Patient was seen for 8 outpatient PT visits from 11/10/2017 to 12/11/2017. Treatment included: evaluation, HEP, pt education, manual therapy, ther ex, and NMR. Pt has met some short and long-term goals. Continue HEP, Pt to follow-up with MD as planned and [facility] and aquatic therapy. This patient is discharged from outpatient PT Services.    Pt's spiritual,  cultural and educational needs considered and pt agreeable to plan of care and goals as stated below:     Anticipated Barriers for physical therapy: none      PT reviewed FOTO scores for Gerda Lai on 12/11/2017.   FOTO scores were entered into [employer] - see media section.    CMS Impairment/Limitation/Restriction for FOTO Shoulder Survey        Status  Limit   G-Code CMS Severity Modifier  Intake      56%      44% Current Status CK - At least 40 percent but less than 60 percent  Predicted  62%    38% Goal Status+ CJ - At least 20 percent but less than 40 percent  12/11/2017  62%  38% D/C Status CJ **only report if this is discharge survey  CATEGORY: Carrying     Short Term GOALS:  In 4 weeks, pt. will:  - report compliance and demonstrate correct form with initial HEP - NOT MET  - increase PROM by 10 degrees to reach with less pain overhead - MET 12/11/17  - sit with appropriate posture without forward head or protracted/downwardly rotated scapula with min cueing for 3 minutes - NOT MET  - lift 5# object floor to waist with RUE without increase in pain 3 of 3 trials - NOT MET  - decrease outcome measure limitation to 40% - MET 12/11/17    Long Term GOALS:  In 12 weeks, pt. will:  - be independent and compliant with HEP and SX management - NOT MET  - increase PROM by 20 degrees to reach with less pain overhead or behind back - PARTIAL MET  - sit with appropriate posture without forward head or protracted/downwardly rotated scapula with no cueing for 10 minutes - NOT MET  - lift 10# object floor to waist with RUE without increase in pain 5 of 5 trials - NOT MET  - lift 3# waist to overhead without increase in pain with good motor control to improve ability to reach cabinets - NOT MET  - decrease outcome measure limitation to 38% - MET 12/11/17    Plan   D/C from PT. Cont HEP independently. Pt may contact PT or referring MD with any questions.    Neela Barroso, PT     PT/PTA met face to face to discuss  patient's treatment plan and progress towards established goals.  Hold PT and return to gym  Patient will be seen by physical therapist every sixth visit and minimally once per month.    Additional information: referral to [facility] with MD clearance

## 2017-12-11 NOTE — PATIENT INSTRUCTIONS
BED MOBILITY: Side-Lying to Sit    Lie on back, roll completely to side, move legs to edge of bed. Push down with both hands/elbows while moving legs off bed to reach sitting position.        Flexibility: Upper Trapezius Stretch    Sit up tall and place one hand behind your back or under your thigh and the other on top of your head.  Gently draw your head towards the side of your top hand. Do not over-stretch.  Hold 30 seconds.   Repeat 1 times each side per set. Do 1 sets per session. Do 1-2 sessions per day.        Pec Door Stretch     doorframe with BOTH palms, forearms, and elbows against frame. Lean forward until a gentle stretch is felt in the chest. Hold 30 seconds.  Repeat 1 times per session. Do 1-2 sessions per day.        Strengthening: Isometric Internal Rotation    Using door frame for support, place palm of right hand into wall. Keep elbow in at side. Gently turn to the left until a stretch is felt in the front of the shoulder. Hold 30 seconds.  Repeat 1 times per set. Do 1-2 sessions per day.     https://orth.[email or web address].us/817         Scapular Retraction (Standing)    With arms at sides, squeeze shoulder blades together. Do not shrug and do not hold your breath. Hold 5 seconds.  Repeat 10 times per session. Do 1 sessions per day.         Chin Tuck, Sitting / Standing    Stand or sit, head in comfortable, centered position. Draw chin in towards throat, pulling head straight back, keeping jaw and eyes level. Do not hold your breath. Hold 5 seconds.  Repeat 10 times per session. Do 1 sessions per day.    Copyright © I. All rights reserved.

## 2017-12-11 NOTE — PROGRESS NOTES
Physical Therapy Daily Note   Name: Gerda Lai  Clinic Number: [id number]    Evaluation Date: 11/22/2017  Visit #: 8 / 20  Authorization period Expiration: 12/31/2017  Plan of Care Expiration: 2/10/2018  Precautions: standard    Time In: 2:10 PM  Time Out: 3:00 PM  Total 1:1 Treatment Time: 50 min    Diagnosis:   Encounter Diagnoses   Name Primary?    Decreased ROM of right shoulder     Right rotator cuff tendinitis     Chronic pain of both shoulders      Physician: Cesar Burroughs MD  Treatment Orders: PT Eval and Treat    Subjective     Pt reports: SEE RE-EVAL  Pain Scale: 0/10      Objective       Gerda received 50 minutes of therapeutic exercise including:     Pulleys AAROM x 3 min  Seated Chin tucks x 20, max cueing  Scap retraction x 20  Stretching 2 x 30 sec each: UT, pec low door stretch, ER stretch at wall  Supine wand AAROM 2 x 10      Adjusted SPC for RUE to assist LLE pain. Lowered 3 notches.    Written Home Exercises Provided: Patient educated to continue with previously issued HEP.  Exercises were reviewed and Gerda was able to demonstrate them prior to the end of the session. Pt received a written copy of exercises to perform at home. Gerda demonstrated good  understanding of the education provided.     Assessment     SEE RE-EVAL    Anticipated barriers to physical therapy: none  Pt's spiritual, cultural and educational needs considered and pt agreeable to plan of care and goals.    Plan   D/C from PT. Cont HEP independently. Pt may contact PT or referring MD with any questions.

## 2017-12-13 PROBLEM — M75.81 RIGHT ROTATOR CUFF TENDINITIS: Status: RESOLVED | Noted: 2017-10-20 | Resolved: 2017-12-13

## 2017-12-13 PROBLEM — M25.611 DECREASED ROM OF RIGHT SHOULDER: Status: RESOLVED | Noted: 2017-11-10 | Resolved: 2017-12-13

## 2017-12-27 ENCOUNTER — HOSPITAL ENCOUNTER (OUTPATIENT)
Dept: RADIOLOGY | Facility: HOSPITAL | Age: 82
Discharge: HOME | End: 2017-12-27
Attending: PHYSICIAN ASSISTANT
Payer: MEDICARE

## 2017-12-27 ENCOUNTER — OFFICE VISIT (OUTPATIENT)
Dept: ORTHOPEDICS | Facility: CLINIC | Age: 82
End: 2017-12-27
Payer: MEDICARE

## 2017-12-27 VITALS — WEIGHT: 147.69 LBS | BODY MASS INDEX: 26.17 KG/M2 | HEIGHT: 63 IN

## 2017-12-27 DIAGNOSIS — M25.552 PAIN OF LEFT HIP JOINT: ICD-10-CM

## 2017-12-27 DIAGNOSIS — M25.562 LEFT KNEE PAIN, UNSPECIFIED CHRONICITY: ICD-10-CM

## 2017-12-27 DIAGNOSIS — M25.562 LEFT KNEE PAIN, UNSPECIFIED CHRONICITY: Primary | ICD-10-CM

## 2017-12-27 DIAGNOSIS — M17.12 PRIMARY OSTEOARTHRITIS OF LEFT KNEE: Primary | ICD-10-CM

## 2017-12-27 PROCEDURE — 99213 OFFICE O/P EST LOW 20 MIN: CPT | Mod: PBBFAC,25 | Performed by: PHYSICIAN ASSISTANT

## 2017-12-27 PROCEDURE — 73562 X-RAY EXAM OF KNEE 3: CPT | Mod: 26,LT,, | Performed by: RADIOLOGY

## 2017-12-27 PROCEDURE — 73560 X-RAY EXAM OF KNEE 1 OR 2: CPT | Mod: 26,59,RT, | Performed by: RADIOLOGY

## 2017-12-27 PROCEDURE — 73502 X-RAY EXAM HIP UNI 2-3 VIEWS: CPT | Mod: 26,LT,, | Performed by: RADIOLOGY

## 2017-12-27 PROCEDURE — 73560 X-RAY EXAM OF KNEE 1 OR 2: CPT | Mod: TC,RT

## 2017-12-27 PROCEDURE — 20610 DRAIN/INJ JOINT/BURSA W/O US: CPT | Mod: S$PBB,LT,, | Performed by: PHYSICIAN ASSISTANT

## 2017-12-27 PROCEDURE — 99999 PR PBB SHADOW E&M-EST. PATIENT-LVL III: CPT | Mod: PBBFAC,,, | Performed by: PHYSICIAN ASSISTANT

## 2017-12-27 PROCEDURE — 20610 DRAIN/INJ JOINT/BURSA W/O US: CPT | Mod: PBBFAC | Performed by: PHYSICIAN ASSISTANT

## 2017-12-27 PROCEDURE — 73502 X-RAY EXAM HIP UNI 2-3 VIEWS: CPT | Mod: TC,LT

## 2017-12-27 PROCEDURE — 99213 OFFICE O/P EST LOW 20 MIN: CPT | Mod: 25,S$PBB,, | Performed by: PHYSICIAN ASSISTANT

## 2017-12-27 RX ORDER — HYALURONATE SODIUM 20 MG/2 ML
2 SYRINGE (ML) INTRAARTICULAR WEEKLY
Status: COMPLETED | OUTPATIENT
Start: 2017-12-27 | End: 2018-01-10

## 2017-12-27 RX ADMIN — Medication 20 MG: at 04:12

## 2017-12-27 NOTE — PROGRESS NOTES
Subjective:      Patient ID: Gerda Lai is a 84 y.o. female.    Chief Complaint: No chief complaint on file.    HPI  84 year old female presents with chief complaint of increased left knee pain x 2 days. She was sitting with her left leg crossed over the other and when she uncrossed her leg, she felt increased pain. Pain is worse going from sit to stand. She has pain with walking. Tylenol gives mild relief. She reports swelling. She had left knee cortisone injection on 12/4 that gave her relief until the incident 2 days ago.   Review of Systems   Constitution: Negative for chills, fever and night sweats.   Cardiovascular: Negative for chest pain.   Respiratory: Negative for cough and shortness of breath.    Hematologic/Lymphatic: Does not bruise/bleed easily.   Skin: Negative for color change.   Gastrointestinal: Negative for heartburn.   Genitourinary: Negative for dysuria.   Neurological: Negative for numbness and paresthesias.   Psychiatric/Behavioral: Negative for altered mental status.   Allergic/Immunologic: Negative for persistent infections.         Objective:            Ortho/SPM Exam  General :   alert, appears stated age and cooperative   Gait: Antalgic. The patient can bear weight on the injured extremity.   Left Lower Extremity  Hip Palpation:  no tenderness over the greater  trochanter   Hip ROM: 100% of normal    Knee Effusion:  1+   Ecchymosis:  none   Knee ROM:  0 to 120 degrees with subpatellar   crepitance.   Patella:  Patella does track normally.  Patellar apprehension test: negative  Patellar compression test: negative   Tenderness: medial joint line and lateral joint line   Stability:  Lachman's test: negative  Posterior drawer: negative  Medial collateral ligament: negative  Lateral collateral ligament: negative         Lala's Test:  negative with no joint line tenderness   Sensation:   intact to light touch   Pulses: normal DP and PT pulses         X-ray: ordered and reviewed by  myself. DJD both knees with mild progression compared to last year.        Assessment:       Encounter Diagnosis   Name Primary?    Primary osteoarthritis of left knee Yes          Plan:       Discussed treatment options with patient. She would like to try euflexxa injections. First injection given. Ice and elevate knee. Continue tylenol as needed. RTC in 1 week for second injection.     PROCEDURE:  I have explained the risks, benefits, and alternatives of the procedure in detail.  The patient voices understanding and all questions have been answered.  The patient agrees to proceed as planned. So after I performed a sterile prep of the skin in the normal fashion the left knee is injected using a 22 gauge needle from the anteromedial approach with 2cc of euflexxa solution. The patient is reminded that it can take 6 - 8 weeks to see all the affects of this treatment, they must complete all three injections to see all the affects and the treatment can not be repeated any earlier than six months.

## 2018-01-02 ENCOUNTER — OUTPATIENT CASE MANAGEMENT (OUTPATIENT)
Dept: ADMINISTRATIVE | Facility: OTHER | Age: 83
End: 2018-01-02

## 2018-01-02 NOTE — PATIENT INSTRUCTIONS
Eating Heart-Healthy Foods  Eating has a big impact on your heart health. In fact, eating healthier can improve several of your heart risks at once. For instance, it helps you manage weight, cholesterol, and blood pressure. Here are ideas to help you make heart-healthy changes without giving up all the foods and flavors you love.  Getting started  · Talk with your health care provider about eating plans, such as the DASH or Mediterranean diet. You may also be referred to a dietitian.  · Change a few things at a time. Give yourself time to get used to a few eating changes before adding more.  · Work to create a tasty, healthy eating plan that you can stick to for the rest of your life.    Goals for healthy eating  Below are some tips to improve your eating habits:  · Limit saturated fats and trans fats. Saturated fats raise your levels of cholesterol, so keep these fats to a minimum. They are found in foods such as fatty meats, whole milk, cheese, and palm and coconut oils. Avoid trans fats because they lower good cholesterol as well as raise bad cholesterol. Trans fats are most often found in processed foods.  · Reduce sodium (salt) intake. Eating too much salt may increase your blood pressure. Limit your sodium intake to 2,300 milligrams (mg) per day, or less if your health care provider recommends it. Dining out less often and eating fewer processed foods are two great ways to decrease the amount of salt you consume.  · Managing calories. A calorie is a unit of energy. Your body burns calories for fuel, but if you eat more calories than your body burns, the extras are stored as fat. Your health care provider can help you create a diet plan to manage your calories. This will likely include eating healthier foods as well as exercising regularly. To help you track your progress, keep a diary to record what you eat and how often you exercise.  Choose the right foods  Aim to make these foods staples of your diet. If  you have diabetes, you may have different recommendations than what is listed here:  · Fruits and vegetable provide plenty of nutrients without a lot of calories. At meals, fill half your plate with these foods. Split the other half of your plate between whole grains and lean protein.  · Whole grains are high in fiber and rich in vitamins and nutrients. Good choices include whole-wheat bread, pasta, and brown rice.  · Lean proteins give you nutrition with less fat. Good choices include fish, skinless chicken, and beans.  · Low-fat or nonfat dairy provides nutrients without a lot of fat. Try low-fat or nonfat milk, cheese, or yogurt.  · Healthy fats can be good for you in small amounts. These are unsaturated fats, such as olive oil, nuts, and fish. Try to have at least 2 servings per week of fatty fish such as salmon, sardines, mackerel, rainbow trout, and albacore tuna. These contain omega-3 fatty acids, which are good for your heart. Flaxseed is another source of a heart-healthy fat.  More on heart healthy eating    Read food labels  Healthy eating starts at the grocery store. Be sure to pay attention to food labels on packaged foods. Look for products that are high in fiber and protein, and low in saturated fat, cholesterol, and sodium. Avoid products that contain trans fat. And pay close attention to serving size. For instance, if you plan to eat two servings, double all the numbers on the label.  Prepare food right  A key part of healthy cooking is cutting down on added fat and salt. Look on the internet for lower-fat, lower-sodium recipes. Also, try these tips:  · Remove fat from meat and skin from poultry before cooking.  · Skim fat from the surface of soups and sauces.  · Broil, boil, bake, steam, grill, and microwave food without added fats.  · Choose ingredients that spice up your food without adding calories, fat, or sodium. Try these items: horseradish, hot sauce, lemon, mustard, nonfat salad dressings,  and vinegar. For salt-free herbs and spices, try basil, cilantro, cinnamon, pepper, and rosemary.  Date Last Reviewed: 6/25/2015  © 8671-8717 [employer]. [address] [place] [address], [place] [address]. All rights reserved. This information is not intended as a substitute for professional medical care. Always follow your healthcare professional's instructions.        Eating the Right Number of Calories (4872-3883 Guidelines)  Calories are a measure of the energy you get from food. If you eat more calories than you use, you will gain weight. If you eat fewer calories than you use, you will lose weight. Below are tables that give the number of calories needed each day. Look for your gender, age, and activity level. If you stick to this number, you should neither gain nor lose weight. Note that this is an estimated number of calories.* Your exact number may differ.  Women  Age in years Low activity level (calories/day) Moderate activity level (calories/day) High activity level (calories/day)   19 to 30 1,800-2,000 2,000-2,200 2,400   31 to 50 1,800 2,000 2,200   51 and older 1,600 1,800 2,000-2,200      Men  Age in years Low activity level  (calories/day) Moderate activity level (calories/day) High activity level (calories/day)   19 to 30 2,400-2,600 2,600-2,800 3,000   31 to 50 2,200-2,400 2,400-2,600 2,800-3,000   51 and older 2,000-2,200 2,200-2,400 2,400-2,800   Activity levels defined  · Low. Only light physical activity such as that done during typical daily life.  · Moderate. Light physical activity done during typical daily life AND physical activity equal to walking about 1.5 to 3 miles a day at 3 to 4 miles per hour.  · High. Light physical activity done during typical daily life AND physical activity equal to walking more than 3 miles a day at 3 to 4 miles per hour.  *From Dietary Guidelines for Americans, 3573-6533, U.S. Department of Health and Human Services.  Date Last Reviewed: 6/1/2015  ©  5724-6536 [employer]. [address] [place] [address]. All rights reserved. This information is not intended as a substitute for professional medical care. Always follow your healthcare professional's instructions.        Fish Oil, Omega-3 Fatty Acids capsules (OTC)  What is this medicine?  FISH OIL, OMEGA-3 FATTY ACIDS (Fish Oil, oh MAY ga - 3 fatty AS ids) are essential fats. It is promoted to help support a healthy heart. This dietary supplement is used to add to a healthy diet. The FDA has not approved this supplement for any medical use.  This supplement may be used for other purposes; ask your health care provider or pharmacist if you have questions.  How should I use this medicine?  Take this medicine by mouth with a glass of water. Follow the directions on the package or prescription label. Take with food. Take your medicine at regular intervals. Do not take your medicine more often than directed.  Talk to your pediatrician regarding the use of this medicine in children. Special care may be needed. This medicine should not be used in children without a doctor's advice.  What side effects may I notice from receiving this medicine?  Side effects that you should report to your doctor or health care professional as soon as possible:  · allergic reactions like skin rash, itching or hives, swelling of the face, lips, or tongue  · breathing problems  · changes in your moods or emotions  · unusual bleeding or bruising  Side effects that usually do not require medical attention (report to your doctor or health care professional if they continue or are bothersome):  · bad or fishy breath  · belching  · diarrhea  · nausea  · stomach gas, upset  · weight gain  What may interact with this medicine?  · aspirin and aspirin-like medicines  · herbal products like danshen, dong quai, garlic pills, arlin, ginkgo biloba, horse chestnut, willow bark, and others  · medicines that treat or prevent blood  clots like enoxaparin, heparin, warfarin  What if I miss a dose?  If you miss a dose, take it as soon as you can. If it is almost time for your next dose, take only that dose. Do not take double or extra doses.  Where should I keep my medicine?  Keep out of the reach of children.  Store at room temperature or as directed on the package label. Protect from moisture. Do not freeze. Throw away any unused medicine after the expiration date.  What should I tell my health care provider before I take this medicine?  They need to know if you have any of these conditions  · bleeding problems  · lung or breathing disease, like asthma  · an unusual or allergic reaction to fish oil, omega-3 fatty acids, fish, other medicines, foods, dyes, or preservatives  · pregnant or trying to get pregnant  · breast-feeding  What should I watch for while using this medicine?  Follow a good diet and exercise plan. Taking a dietary supplement does not replace a healthy lifestyle. Some foods that have omega-3 fatty acids naturally are fatty fish like albacore tuna, halibut, herring, mackerel, lake trout, salmon, and sardines.  Too much of this supplement can be unsafe. Talk to your doctor or health care provider about how much of this supplement is right for you.  If you are scheduled for any medical or dental procedure, tell your healthcare provider that you are taking this medicine. You may need to stop taking this medicine before the procedure.  Herbal or dietary supplements are not regulated like medicines. Rigid  standards are not required for dietary supplements. The purity and strength of these products can vary. The safety and effect of this dietary supplement for a certain disease or illness is not well known. This product is not intended to diagnose, treat, cure or prevent any disease.  The Food and Drug Administration suggests the following to help consumers protect themselves:  · Always read product labels and follow  directions.  · Natural does not mean a product is safe for humans to take.  · Look for products that include USP after the ingredient name. This means that the  followed the standards of the US Pharmacopoeia.  · Supplements made or sold by a nationally known food or drug company are more likely to be made under tight controls. You can write to the company for more information about how the product was made.  NOTE:This sheet is a summary. It may not cover all possible information. If you have questions about this medicine, talk to your doctor, pharmacist, or health care provider. Copyright© 2017 [employer]        MyPlate Worksheet: 1,800 Calories  Your calorie needs are about 1,800 calories a day. Below are the U.S. Department of Agriculture (USDA) guidelines for your daily recommended amount of each food group.  Vegetables  2½ cups Fruits  1½ cups Grains  6 ounces Dairy  3 cups Protein  5 ounces   Eat a variety of vegetables each day.  Aim for these amounts each week:  · 1½ cups dark green vegetables  · 5½ cups red or orange-colored vegetables  · 1½ cups dry beans and peas  · 5 cups starchy vegetables  · 4 cups other vegetables Eat a variety of fruits each day.  Go easy on fruit juices.  Good choices of fruits include:  · Berries  · Bananas  · Apples  · Melon  · Dry fruit  · Frozen fruit  · Canned fruit Choose whole grains whenever you can.  Aim to eat at least 3 ounces of whole grains each day:  · Bread  · Cereal  · Rice  · Pasta  · Potatoes  · Tortillas Choose low-fat or fat-free milk, yogurt, or cheese each day.  Good choices include:  · Low-fat or fat-free milk or chocolate milk  · Low-fat or fat-free yogurt  · Low-fat or fat-free cottage cheese or other reduced-fat cheeses  · Calcium-fortified milk alternatives Choose low-fat or lean meats, poultry, fish and seafood each day.  Vary your protein. Choose more:  · Fish and other seafood  · Lean low-fat meat and poultry  · Eggs  · Beans,  "peas  · Tofu  · Unsalted nuts and seeds  Choose less high-fat and red meat.   Source: USDA MyPlate, www.choosemyplate.gov  Know your limits on oils (fats) and sugars:  · Your allowance for oils is 24 grams or about 5 teaspoons a day (oil includes vegetable oil, mayonnaise, soft margarine, salad dressing, nuts, olives, avocados, and some fish).  · Limit the extras (solid fats and sugars, also called "empty calories") to 130 calories a day.  · Cut back on salt (sodium). Stay under 2,300 mg sodium a day. If you have a health condition such as heart disease or high blood pressure, your doctor will likely tell you to limit sodium to no more than 1,500 mg a day.  Get moving and be active!  Aim for at least 30 minutes of physical activity most days of the week or 150 minutes of exercise a week.  MyPlate Servings Worksheet: 1,800 calories  This worksheet tells you how many servings you should get each day from each food group, and tells you how much food makes a serving. Use this as a guide as you plan your meals throughout the day. Track your progress daily by writing in what you actually ate.  Food Group  Daily MyPlate Goal  What You Ate Today    Vegetables 5 Half-cups or 5 Servings  One serving is:  ½ cup cut-up raw or cooked vegetables  1 cup raw, leafy vegetables  ½ baked sweet potato  ½ cup vegetable juice  Note: At meals, fill half your plate with vegetables and fruit.     Fruits 3 Half-cups or 3 Servings  One serving is:  ½ cup fresh, frozen, or canned fruit  1 medium piece of fruit  1 cup of berries or melon  ½ cup dried fruit  ½ cup 100% fruit juice  Note: Make most choices fruit instead of juice.     Grains 6 Servings or 6 Ounces  One serving is:  1 slice bread  1 cup dry cereal  ½ cup cooked rice, pasta, or cereal  1 5-inch tortilla  Note: Choose whole grains for at least half of your servings each day.     Dairy 3 Servings or 3 Cups  One serving is:  1 cup milk  1½ ounces reduced-fat hard cheese  2 ounces " processed cheese  1 cup low-fat yogurt  1/3 cup shredded cheese  Note: Choose low-fat or fat-free most often.     Protein 5 Servings or 5 Ounces  One serving is:  1 ounce cooked lean beef, pork, lamb, or ham  1 ounce cooked chicken or turkey (no skin)  1 ounce cooked fish or shellfish (not fried)  1 egg  ¼ cup egg substitute  ½ ounce nuts or seeds  1 tablespoon peanut or almond butter  ¼ cup cooked dry beans or peas  ½ cup tofu  2 tablespoons hummus     Date Last Reviewed: 6/1/2015  © 3932-6723 [employer]. [address] [place] [address], [place] [address]. All rights reserved. This information is not intended as a substitute for professional medical care. Always follow your healthcare professional's instructions.

## 2018-01-02 NOTE — PROGRESS NOTES
1/2/18  Chart reviewed in [facility] and no new ed or hospitalizations noted    Progress towards goals:    LT goal 1. Patient/caregiver will accept life style changes to manage and improve symptoms of arthritis prior to discharge from [facility]. - Priority: high    ST goal 1Patient/caregiver will verbalize 1 noninvasive pain relief measures to help manage the pain within 2 weeks.  Part met  Pt saw ortho PA and had left knee injection with euflexxa. We discussed action and use of the medication injected and pt has good understanding of the medication and process of 3 injections etc.   She states she has had mod pain relief but still having trouble walking normally.  States pain is about 1 or 2.  She states her next injection is for Thursday 1/4/18.  Pt asked about beginning exercises at the gym.  Advised pt to check with ortho pa this week at her appointment on Thursday what restrictions they have if any.    Patient/caregiver will verbalize 1 strategies to manage fatigue within 1 month.  Part met  Pt states she received the literature I sent and has started to read it.  She states that pain is decreasing since the injection and she is reviewing diet modification  She states she met with staff at [facility] and also met with nutritionist.  They advised pt that she is not eating enough calories.  We discussed daily caloric needs and health meals              Plan  Follow up on start of fish oil   Follow up on receipt of new diet info and discuss at encounter     Clinical Reference Documents Added to Patient Instructions       Document    EATING HEART-HEALTHY FOODS (ENGLISH)    EATING THE RIGHT NUMBER OF CALORIES (1194-2932 GUIDELINES)  (ENGLISH)    FISH OIL, OMEGA-3 FATTY ACIDS CAPSULES (OTC) (ENGLISH)    MYPLATE WORKSHEET: 1,800 CALORIES (ENGLISH)        Review diet  Review exercise to improve mobility

## 2018-01-02 NOTE — LETTER
January 2, 2018    Gerda JEREMIAH Ming  [address] [place] [address]             [facility] [address] [place] [address] Dear Ms Lai,    I have included some information I found on recommended calories and then a guide for breakfast lunch and dinner for the recommended calories.  Please review and we can discuss at our next phone call.    Sincerely,  Kristie Mancuso RN,[facility]  Outpatient Complex Case Management  [phone]

## 2018-01-04 ENCOUNTER — OFFICE VISIT (OUTPATIENT)
Dept: ORTHOPEDICS | Facility: CLINIC | Age: 83
End: 2018-01-04
Payer: MEDICARE

## 2018-01-04 DIAGNOSIS — M89.8X6 PAIN IN LEFT TIBIA: ICD-10-CM

## 2018-01-04 DIAGNOSIS — M17.12 PRIMARY OSTEOARTHRITIS OF LEFT KNEE: Primary | ICD-10-CM

## 2018-01-04 PROCEDURE — 20610 DRAIN/INJ JOINT/BURSA W/O US: CPT | Mod: PBBFAC | Performed by: PHYSICIAN ASSISTANT

## 2018-01-04 PROCEDURE — 99999 PR PBB SHADOW E&M-EST. PATIENT-LVL III: CPT | Mod: PBBFAC,,, | Performed by: PHYSICIAN ASSISTANT

## 2018-01-04 PROCEDURE — 20610 DRAIN/INJ JOINT/BURSA W/O US: CPT | Mod: S$PBB,LT,, | Performed by: PHYSICIAN ASSISTANT

## 2018-01-04 PROCEDURE — 99499 UNLISTED E&M SERVICE: CPT | Mod: S$PBB,,, | Performed by: PHYSICIAN ASSISTANT

## 2018-01-04 PROCEDURE — 99213 OFFICE O/P EST LOW 20 MIN: CPT | Mod: PBBFAC,25 | Performed by: PHYSICIAN ASSISTANT

## 2018-01-04 RX ORDER — MELOXICAM 7.5 MG/1
7.5 TABLET ORAL DAILY
Qty: 30 TABLET | Refills: 0 | Status: ON HOLD | OUTPATIENT
Start: 2018-01-04 | End: 2018-03-06

## 2018-01-04 RX ADMIN — Medication 20 MG: at 03:01

## 2018-01-04 NOTE — PROGRESS NOTES
Subjective:      Patient ID: Gerda Lai is a 84 y.o. female.    Chief Complaint: No chief complaint on file.    HPI  Patient returns for the second of three. The patient tolerated the last injection well. The patient reports the Euflexxa injection in the left knee(s) has brought about moderate improvement..    Review of Systems   Constitution: Negative for chills, fever and night sweats.   Cardiovascular: Negative for chest pain.   Respiratory: Negative for cough and shortness of breath.    Hematologic/Lymphatic: Does not bruise/bleed easily.   Skin: Negative for color change.   Gastrointestinal: Negative for heartburn.   Genitourinary: Negative for dysuria.   Neurological: Negative for numbness and paresthesias.   Psychiatric/Behavioral: Negative for altered mental status.   Allergic/Immunologic: Negative for persistent infections.         Objective:            Ortho/SPM Exam  The left knee is examined, there is mild evidence of swelling or effusion.  There is no evidence of erythema. Skin, pulses, sensation are intact.            Assessment:       Encounter Diagnosis   Name Primary?    Primary osteoarthritis of left knee Yes          Plan:       PROCEDURE:  I have explained the risks, benefits, and alternatives of the procedure in detail.  The patient voices understanding and all questions have been answered.  The patient agrees to proceed as planned. So after I performed a sterile prep of the skin in the normal fashion the left knee is injected using a 22 gauge needle from the anteromedial approach with 2cc of euflexxa solution. The patient is reminded that it can take 6 - 8 weeks to see all the affects of this treatment, they must complete all three injections to see all the affects and the treatment can not be repeated any earlier than six months.

## 2018-01-10 ENCOUNTER — OFFICE VISIT (OUTPATIENT)
Dept: ORTHOPEDICS | Facility: CLINIC | Age: 83
End: 2018-01-10
Payer: MEDICARE

## 2018-01-10 DIAGNOSIS — M17.12 PRIMARY OSTEOARTHRITIS OF LEFT KNEE: Primary | ICD-10-CM

## 2018-01-10 PROCEDURE — 99213 OFFICE O/P EST LOW 20 MIN: CPT | Mod: PBBFAC | Performed by: PHYSICIAN ASSISTANT

## 2018-01-10 PROCEDURE — 99999 PR PBB SHADOW E&M-EST. PATIENT-LVL III: CPT | Mod: PBBFAC,,, | Performed by: PHYSICIAN ASSISTANT

## 2018-01-10 PROCEDURE — 20610 DRAIN/INJ JOINT/BURSA W/O US: CPT | Mod: PBBFAC | Performed by: PHYSICIAN ASSISTANT

## 2018-01-10 PROCEDURE — 99499 UNLISTED E&M SERVICE: CPT | Mod: S$PBB,,, | Performed by: PHYSICIAN ASSISTANT

## 2018-01-10 PROCEDURE — 20610 DRAIN/INJ JOINT/BURSA W/O US: CPT | Mod: S$PBB,LT,, | Performed by: PHYSICIAN ASSISTANT

## 2018-01-10 RX ADMIN — Medication 20 MG: at 03:01

## 2018-01-10 NOTE — PROGRESS NOTES
Subjective:      Patient ID: Gerda Lai is a 84 y.o. female.    Chief Complaint: No chief complaint on file.    HPI  Patient returns for the third of three. The patient tolerated the last injection well. The patient reports the Euflexxa injection in the left knee(s) has brought about little improvement..    Review of Systems   Constitution: Negative for chills, fever and night sweats.   Cardiovascular: Negative for chest pain.   Respiratory: Negative for cough and shortness of breath.    Hematologic/Lymphatic: Does not bruise/bleed easily.   Skin: Negative for color change.   Gastrointestinal: Negative for heartburn.   Genitourinary: Negative for dysuria.   Neurological: Negative for numbness and paresthesias.   Psychiatric/Behavioral: Negative for altered mental status.   Allergic/Immunologic: Negative for persistent infections.         Objective:            Ortho/SPM Exam  The left knee is examined, there is mild evidence of swelling or effusion.  There is no evidence of erythema. Skin, pulses, sensation are intact.            Assessment:       Encounter Diagnosis   Name Primary?    Primary osteoarthritis of left knee Yes          Plan:       PROCEDURE:  I have explained the risks, benefits, and alternatives of the procedure in detail.  The patient voices understanding and all questions have been answered.  The patient agrees to proceed as planned. So after I performed a sterile prep of the skin in the normal fashion the left knee is injected using a 22 gauge needle from the anterolateral approach with 2cc of euflexxa solution. The patient is reminded that it can take 6 - 8 weeks to see all the affects of this treatment, they must complete all three injections to see all the affects and the treatment can not be repeated any earlier than six months.

## 2018-01-17 ENCOUNTER — OUTPATIENT CASE MANAGEMENT (OUTPATIENT)
Dept: ADMINISTRATIVE | Facility: OTHER | Age: 83
End: 2018-01-17

## 2018-01-17 NOTE — PROGRESS NOTES
1/17/18  Call to pt and she states she is doing well.  No loss of power at her home and she has all the needed supplies water, food etc.  She states her nieces are checking on her frequently and available to assist her if necessary.      [] Please be sure to have a supply of water, non-perishable food items, flashlights and batteries with you in your house.   [] Please be sure you bring all of your medications with you. Bring at least a five day supply. It is best to bring your medication bottles, in case you are displaced for a longer period of time, therefore you can get your medication refilled from your temporary location.   [] Please bring sure to bring any DME that you may need. This includes walkers, wheelchairs, shower chairs, nebulizer machine, etc.   [] If you are a diabetic- be sure to bring your glucometer and all glucometer monitoring supplies.   [] If you have high blood pressure- be sure to bring your blood pressure cuff so you can continue to monitor.   [] If you have CHF-be sure to bring your scale so you can continue to monitor.  [] If on PEG feeding- be sure to bring tube feedings and feeding supplies.  [] If on oxygen- be sure to bring all of your oxygen supplies: Cannula, portable tanks, concentrator, etc. Also contact you Oxygen Supply Company to find out the nearest location of an oxygen supply company to where you will be located. ([employer]: [phone], [facility]: [phone], [facility]:[phone], [employer]: [phone]).   [] If you receive hemodialysis- you should already have a plan in place with your dialysis center. If you do not know where you need to evacuate to in order to be close to a dialysis center- reach out to your dialysis center for further direction. ([facility]  service line: [phone], [facility]  service line [phone])  [] If receiving treatment at an infusion center- please contact the infusion center to find out which infusion  center they have a contract with. Also ask your infusion center for location of the infusion center they are in contract with, so if need be you can evacuate to that area.   Office of Emergency Preparedness Phone number:  [] [facility]: [phone]  [] [facility]: [phone]    Plan to close at next encounter

## 2018-01-22 DIAGNOSIS — I10 ESSENTIAL HYPERTENSION: ICD-10-CM

## 2018-01-22 RX ORDER — LORAZEPAM 0.5 MG/1
TABLET ORAL
Qty: 60 TABLET | Refills: 1 | Status: SHIPPED | OUTPATIENT
Start: 2018-01-22 | End: 2018-07-03 | Stop reason: SDUPTHER

## 2018-01-23 RX ORDER — LABETALOL 100 MG/1
TABLET, FILM COATED ORAL
Qty: 135 TABLET | Refills: 1 | Status: SHIPPED | OUTPATIENT
Start: 2018-01-23 | End: 2018-03-20 | Stop reason: SDUPTHER

## 2018-01-24 ENCOUNTER — OUTPATIENT CASE MANAGEMENT (OUTPATIENT)
Dept: ADMINISTRATIVE | Facility: OTHER | Age: 83
End: 2018-01-24

## 2018-01-24 NOTE — PROGRESS NOTES
1/24/18  Chart reviewed in [facility] no new ed visits or hospitalizations1/2/18  Chart reviewed in [facility] and no new ed or hospitalizations noted    Progress towards goals:    LT goal 1. Patient/caregiver will accept life style changes to manage and improve symptoms of arthritis prior to discharge from [facility]. - Priority: high    ST goal 1Patient/caregiver will verbalize 1 noninvasive pain relief measures to help manage the pain within 2 weeks.  Part met  Pt had her second injection and pain is no worse but not completely relieved and she is aware that it will take several week for full effect  Pain about a 2.  Pt states she read the literature I sent her and tries to stay busy (distraction) for pain relief.  States it helps    Patient/caregiver will verbalize 1 strategies to manage fatigue within 1 month.  Part met  Pt states she received the literature I sent and has started to read.  She is eating a little more but not really noticing any change in energy.  States she plans to start her exercise at [place] soon.  She has started fish oil and reviewed the right number of calories for her.  She verbalized understanding and states it lines up with what the dietician had advised.          Plan  Follow up on start of fish oil   Follow up on receipt of new diet info and discuss at encounter     Clinical Reference Documents Added to Patient Instructions       Document    EATING HEART-HEALTHY FOODS (ENGLISH)    EATING THE RIGHT NUMBER OF CALORIES (8523-5936 GUIDELINES)  (ENGLISH)    FISH OIL, OMEGA-3 FATTY ACIDS CAPSULES (OTC) (ENGLISH)    MYPLATE WORKSHEET: 1,800 CALORIES (ENGLISH)        Review diet  Review exercise to improve mobility

## 2018-01-27 DIAGNOSIS — E78.5 HYPERLIPIDEMIA: ICD-10-CM

## 2018-01-27 RX ORDER — PRAVASTATIN SODIUM 40 MG/1
TABLET ORAL
Qty: 90 TABLET | Refills: 3 | Status: SHIPPED | OUTPATIENT
Start: 2018-01-27 | End: 2019-03-27 | Stop reason: SDUPTHER

## 2018-01-29 DIAGNOSIS — M17.12 PRIMARY OSTEOARTHRITIS OF LEFT KNEE: Primary | ICD-10-CM

## 2018-01-31 ENCOUNTER — OUTPATIENT CASE MANAGEMENT (OUTPATIENT)
Dept: ADMINISTRATIVE | Facility: OTHER | Age: 83
End: 2018-01-31

## 2018-01-31 NOTE — PROGRESS NOTES
1/31/18  Chart reviewed in [facility] no new ed visits or hospitalizations    Progress towards goals:    LT goal 1. Patient/caregiver will accept life style changes to manage and improve symptoms of arthritis prior to discharge from [facility]. - Priority: high    ST goal 1Patient/caregiver will verbalize 1 noninvasive pain relief measures to help manage the pain within 2 weeks.  Part met  Pt reports she continues to improve and she will be starting out patient physical therapy.  Pain in knee is minimal 2    Patient/caregiver will verbalize 1 strategies to manage fatigue within 1 month.  Part met    Pt states appetite is improving and energy level is slightly better.  Hopes that the therapy will help and she can start her exercise at [place] as well.  Advised that the plan moving forward is to close case at next encounter.  Pt verbalized understanding      Plan  Close at next encounter  Follow up on receipt of new diet info and discuss at encounter  Review diet  Review exercise to improve mobility     Clinical Reference Documents Added to Patient Instructions       Document    EATING HEART-HEALTHY FOODS (ENGLISH)    EATING THE RIGHT NUMBER OF CALORIES (1283-9452 GUIDELINES)  (ENGLISH)    FISH OIL, OMEGA-3 FATTY ACIDS CAPSULES (OTC) (ENGLISH)    MYPLATE WORKSHEET: 1,800 CALORIES (ENGLISH)

## 2018-02-07 ENCOUNTER — CLINICAL SUPPORT (OUTPATIENT)
Dept: REHABILITATION | Facility: HOSPITAL | Age: 83
End: 2018-02-07
Payer: MEDICARE

## 2018-02-07 DIAGNOSIS — G89.29 CHRONIC PAIN OF LEFT KNEE: Primary | ICD-10-CM

## 2018-02-07 DIAGNOSIS — R29.898 LEFT LEG WEAKNESS: ICD-10-CM

## 2018-02-07 DIAGNOSIS — M25.562 CHRONIC PAIN OF LEFT KNEE: Primary | ICD-10-CM

## 2018-02-07 PROCEDURE — G8978 MOBILITY CURRENT STATUS: HCPCS | Mod: CK

## 2018-02-07 PROCEDURE — 97161 PT EVAL LOW COMPLEX 20 MIN: CPT

## 2018-02-07 PROCEDURE — G8979 MOBILITY GOAL STATUS: HCPCS | Mod: CK

## 2018-02-07 NOTE — PLAN OF CARE
[facility]   PATIENT EVALUATION    Name: Gerda Lai  Clinic Number: [id number]    Diagnosis:   Encounter Diagnoses   Name Primary?    Chronic pain of left knee Yes    Left leg weakness      Physician: Bianca Hu PA-C [facility],     Treatment Orders: PT Eval and Treat    History     Past Medical History:   Diagnosis Date    Arthritis     Basal cell carcinoma 09/2016    right post auricular neck     Breast cancer     Cataract     Fibromyalgia     Hyperlipidemia     Hypertension     Personal history of colonic polyps     SCC (squamous cell carcinoma) 2015    R chest    SCC (squamous cell carcinoma) 2016    left medial shoulder    SCC (squamous cell carcinoma) 2017    left knee    Squamous cell carcinoma 2015    right forearm    Thyroid disease     Vaginitis      Current Outpatient Prescriptions   Medication Sig    acetaminophen (TYLENOL) 650 MG TbSR Take 650 mg by mouth as needed.    aliskiren (TEKTURNA) 300 MG Tab Take 1 tablet (300 mg total) by mouth once daily.    amlodipine (NORVASC) 2.5 MG tablet Take 1 tablet (2.5 mg total) by mouth once daily.    ascorbic acid (VITAMIN C) 100 MG tablet Take 100 mg by mouth once daily.    B INFANTIS/B ANI/B JOSE/B BIFID (PROBIOTIC 4X ORAL) Take 1 tablet by mouth daily    chlorthalidone (HYGROTEN) 25 MG Tab Take 12.5 mg by mouth once daily.     coenzyme Q10 (CO Q-10) 100 mg capsule Take 100 mg by mouth once daily.    fluticasone (FLONASE) 50 mcg/actuation nasal spray USE 1 SPRAY IN EACH NOSTRIL ONCE DAILY (Patient taking differently: as needed. )    glucosamine-chondroitin 500-400 mg tablet Take 1 tablet by mouth 2 (two) times daily.     labetalol (NORMODYNE) 100 MG tablet TAKE 1 AND 1/2 TABLETS BY MOUTH TWICE A DAY    LORazepam (ATIVAN) 0.5 MG tablet TAKE 1/2 TABLET BY MOUTH TWICE A DAY    meloxicam (MOBIC) 7.5 MG tablet TAKE 1 TABLET (7.5 MG TOTAL) BY MOUTH ONCE DAILY.    multivitamin capsule  Take 1 capsule by mouth once daily.    omeprazole (PRILOSEC OTC) 20 MG tablet Take 20 mg by mouth once daily.      pravastatin (PRAVACHOL) 40 MG tablet TAKE 1 TABLET (40 MG TOTAL) BY MOUTH ONCE DAILY.    triamcinolone acetonide 0.1% (KENALOG) 0.1 % cream AAA bid for itching; not more than 2 weeks straight in same location    UNABLE TO FIND once daily. OTC EYE DROP; Systane     No current facility-administered medications for this visit.      Review of patient's allergies indicates:   Allergen Reactions    Voltaren [diclofenac sodium] Other (See Comments)     Drops blood pressure    Clarithromycin Other (See Comments)     Weak, extreme fatigue. dizziness    Losartan Rash    Flexeril [cyclobenzaprine] Other (See Comments)     Dizziness    Iodine and iodide containing products     Lisinopril Other (See Comments)     Cough and sensation of throat swelling/?angioedema    Metoprolol Swelling     Tightness in throat    Sulfa (sulfonamide antibiotics) Rash    Tramadol      Dizzy and weak    Verapamil (bulk) Palpitations       Precautions: standard    Evaluation Date: 2/7/18  Start Time: 1227  Stop Time: 1300  Visit # authorized: 1/20  Authorization period: 12/31/18  Plan of care expiration: 5/7/18    Hx of present illness: Knee pain began insidiously years ago. 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 Pt was already in therapy when she twisted her knee a couple months ago and did not return to treatment. She had only been to 2 sessions when she twisted her knee. Pt has hx of R hip replacement.      Subjective     Gerda Lai states she feels depressed about not being able to do anything anymore. Pt reports she has had 1 coritzone shot and 3 gels injections in her L knee (euflexxa) but it has not helped her much. Pt reports if she sits too long she cannot get up; she has difficulty watching TV at night >45 minutes and has difficulty getting up after sitting through a movie. Pt  "reports she has not fallen but she does feel like her knee will give out. Pt uses a walker at home and a cane when she is out. Pt cannot walk >200ft without a break.    Pain:  Location: L knee joint and lateral thigh  Description: Aching  Activities Which Increase Pain: Sitting, standing, walking  Activities Which Decrease Pain: hot bath, massage lasts an hour, 1 tylenol per day which helps  Pain Scale: 1/10 now 7/10 at worst 0/10 at best    Physical Therapy Goals: to get stronger and avoid knee replacement        Objective     Observation: Pt came into clinic in WC but stood up and ambulated to mat with single point cane    Posture: decreased weight shift to LLE    Gait: R lateral trunk lean, decreased L arm swing, increase support on RUE, decreased weight shift on LLE   Stairs: patient able to ascend and descend 3 4" steps in reciprocal pattern with B handrails; decreased control on descent with RLE      Active/Passive ROM: (measured in degrees)   Knee (R) (L)   Flexion 108 110   Extension WNL WNL       Lower Extremity Strength (graded 0-5 out of 5)    Right LE Left LE   Hip flexion: 4/5 5/5   Hip ER: 3/5 5/5   Hip IR: 5/5 3/5   Knee extension:  5/5 4/5   Ankle dorsiflexion: 5/5 5/5   Posterior fibers of Gluteus Medius 5/5 4/5   Hip extension: 5/5 4/5   Knee flexion:  5/5 4/5   Ankle plantarflexion: 5/5 2/5     Special Tests: (pos. or neg.)    (R) (L)   Squat 30 degrees 30 degrees   Varus Stress Test - -   Valgus Stress test - +   Lachman's test - -   Anish's Test - -   Noble's compression test - -   Patella Tilt Test - -   Ruth's sign - -     Palpation: tender to palpation lateral L knee joint line and lateral thigh    Flexibility:    90/90 HS length: R = 30  degrees ; L =  45 degrees       PT reviewed FOTO scores for Gerda Lai on 2/7/18  FOTO score: 47    Functional Limitations Reports - G Codes  Category: mobility  Tool: FOTO      Current ():  CK  Goal (): CK        TREATMENT:  Therapeutic " exercise: Gerda received therapeutic exercises to develop strength and endurance, flexibility for 5 minutes including: hamstring stretch, bridges, quad sets    Pt. Education: Instructed pt. regarding: Proper technique with all exercises, diagnosis, prognosis, goals, and POC. Pt demo good understanding of the education provided. Gerda demonstrated good return demonstration of activities. No cultural, environmental, or spiritual barriers identified to treatment or learning.    HEP2Go Code: [id number]    Assessment   Patient is a 84 y.o. female referred to outpatient physical therapy who presents with a PT diagnosis of L knee pain demonstrating joint dysfunction and functional limitation as described below. Level of complexity is low;  based on patient's past medical history including the below co-morbidities and personal factors; functional limitations, and clinical presentation directly impacting his/her plan of care. Pt demonstrates good rehab potential. Pt will benefit from physcial therapy services in order to maximize pain free functional mobility. The following goals were discussed with the patient and patient is in agreement with them as to be addressed in the treatment plan. Pt was given a HEP consisting of hamstring stretch, bridges, and quad sets. Pt verbally understood the instructions as they were given and demonstrated proper form and technique during therapy. Pt was advised to perform these exercises free of pain, and to stop performing them if pain occurs.       History  Co-morbidities and personal factors that may impact the plan of care Examination  Body Structures and Functions, activity limitations and participation restrictions that may impact the plan of care Clinical Presentation   Decision Making/ Complexity Score   Co-morbidities:   Neck and LBP            Personal Factors:   Sedentary lifestyle Body Regions: B knees, low back, neck    Body Systems: musculoskeletal      Activity limitations:  sitting, walking      Participation Restrictions: going to movies, exerciding       stable   low       Medical necessity is demonstrated by the following IMPAIRMENTS/PROBLEMS LIST:    1) Pain limiting function   2) Gait abnormality   3) Gluteus medius/quadricep muscle weakness   4) Decreased flexibility   5) Decreased ROM   6) Decreased exercise ability   7) Lack of HEP    GOALS:   Short Term Goals:  4 weeks  1. Patient will be proficient and compliant with HEP.  2. Decrease L knee pain at worst to no greater than 5/10.  3. Pt will be able to stand up from seated position after sitting through a movie without difficulty.  4. Pt will be able to ambulate >2 blocks without complaints of pain.    Long Term Goals: 12 weeks  1. Pt will be able to ambulate >5 blocks without complaints of pain.  2. Decrease L knee pain at worst to no greater than 3/10.  3. Pt will be able to ascend/descend a flight of stairs without complaints of pain.    Plan     Pt will be treated by physical therapy 1-3 times a week for 12 weeks for Pt education, HEP, therapeutic exercises, neuromuscular re-education, soft tissue and joint mobilizations; modalities prn to achieve established goals. Gerda may at times be seen by a PTA as part of the Rehab Team.     I certify the need for these services furnished under this plan of treatment and while under my care.  ______________________________ Physician/Referring Practitioner  Date of Signature    Amy Ortiz, PT, DPT

## 2018-02-07 NOTE — PROGRESS NOTES
[facility]   PATIENT EVALUATION    Name: Gerda Lai  Clinic Number: [id number]    Diagnosis:   Encounter Diagnoses   Name Primary?    Chronic pain of left knee Yes    Left leg weakness      Physician: Bianca Hu PA-C [facility],     Treatment Orders: PT Eval and Treat    History     Past Medical History:   Diagnosis Date    Arthritis     Basal cell carcinoma 09/2016    right post auricular neck     Breast cancer     Cataract     Fibromyalgia     Hyperlipidemia     Hypertension     Personal history of colonic polyps     SCC (squamous cell carcinoma) 2015    R chest    SCC (squamous cell carcinoma) 2016    left medial shoulder    SCC (squamous cell carcinoma) 2017    left knee    Squamous cell carcinoma 2015    right forearm    Thyroid disease     Vaginitis      Current Outpatient Prescriptions   Medication Sig    acetaminophen (TYLENOL) 650 MG TbSR Take 650 mg by mouth as needed.    aliskiren (TEKTURNA) 300 MG Tab Take 1 tablet (300 mg total) by mouth once daily.    amlodipine (NORVASC) 2.5 MG tablet Take 1 tablet (2.5 mg total) by mouth once daily.    ascorbic acid (VITAMIN C) 100 MG tablet Take 100 mg by mouth once daily.    B INFANTIS/B ANI/B JOSE/B BIFID (PROBIOTIC 4X ORAL) Take 1 tablet by mouth daily    chlorthalidone (HYGROTEN) 25 MG Tab Take 12.5 mg by mouth once daily.     coenzyme Q10 (CO Q-10) 100 mg capsule Take 100 mg by mouth once daily.    fluticasone (FLONASE) 50 mcg/actuation nasal spray USE 1 SPRAY IN EACH NOSTRIL ONCE DAILY (Patient taking differently: as needed. )    glucosamine-chondroitin 500-400 mg tablet Take 1 tablet by mouth 2 (two) times daily.     labetalol (NORMODYNE) 100 MG tablet TAKE 1 AND 1/2 TABLETS BY MOUTH TWICE A DAY    LORazepam (ATIVAN) 0.5 MG tablet TAKE 1/2 TABLET BY MOUTH TWICE A DAY    meloxicam (MOBIC) 7.5 MG tablet TAKE 1 TABLET (7.5 MG TOTAL) BY MOUTH ONCE DAILY.    multivitamin capsule  Take 1 capsule by mouth once daily.    omeprazole (PRILOSEC OTC) 20 MG tablet Take 20 mg by mouth once daily.      pravastatin (PRAVACHOL) 40 MG tablet TAKE 1 TABLET (40 MG TOTAL) BY MOUTH ONCE DAILY.    triamcinolone acetonide 0.1% (KENALOG) 0.1 % cream AAA bid for itching; not more than 2 weeks straight in same location    UNABLE TO FIND once daily. OTC EYE DROP; Systane     No current facility-administered medications for this visit.      Review of patient's allergies indicates:   Allergen Reactions    Voltaren [diclofenac sodium] Other (See Comments)     Drops blood pressure    Clarithromycin Other (See Comments)     Weak, extreme fatigue. dizziness    Losartan Rash    Flexeril [cyclobenzaprine] Other (See Comments)     Dizziness    Iodine and iodide containing products     Lisinopril Other (See Comments)     Cough and sensation of throat swelling/?angioedema    Metoprolol Swelling     Tightness in throat    Sulfa (sulfonamide antibiotics) Rash    Tramadol      Dizzy and weak    Verapamil (bulk) Palpitations       Precautions: standard    Evaluation Date: 2/7/18  Start Time: 1227  Stop Time: 1300  Visit # authorized: 1/20  Authorization period: 12/31/18  Plan of care expiration: 5/7/18    Hx of present illness: Knee pain began insidiously years ago. 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 Pt was already in therapy when she twisted her knee a couple months ago and did not return to treatment. She had only been to 2 sessions when she twisted her knee. Pt has hx of R hip replacement.      Subjective     Gerda Lai states she feels depressed about not being able to do anything anymore. Pt reports she has had 1 coritzone shot and 3 gels injections in her L knee (euflexxa) but it has not helped her much. Pt reports if she sits too long she cannot get up; she has difficulty watching TV at night >45 minutes and has difficulty getting up after sitting through a movie. Pt  "reports she has not fallen but she does feel like her knee will give out. Pt uses a walker at home and a cane when she is out. Pt cannot walk >200ft without a break.    Pain:  Location: L knee joint and lateral thigh  Description: Aching  Activities Which Increase Pain: Sitting, standing, walking  Activities Which Decrease Pain: hot bath, massage lasts an hour, 1 tylenol per day which helps  Pain Scale: 1/10 now 7/10 at worst 0/10 at best    Physical Therapy Goals: to get stronger and avoid knee replacement        Objective     Observation: Pt came into clinic in WC but stood up and ambulated to mat with single point cane    Posture: decreased weight shift to LLE    Gait: R lateral trunk lean, decreased L arm swing, increase support on RUE, decreased weight shift on LLE   Stairs: patient able to ascend and descend 3 4" steps in reciprocal pattern with B handrails; decreased control on descent with RLE      Active/Passive ROM: (measured in degrees)   Knee (R) (L)   Flexion 108 110   Extension WNL WNL       Lower Extremity Strength (graded 0-5 out of 5)    Right LE Left LE   Hip flexion: 4/5 5/5   Hip ER: 3/5 5/5   Hip IR: 5/5 3/5   Knee extension:  5/5 4/5   Ankle dorsiflexion: 5/5 5/5   Posterior fibers of Gluteus Medius 5/5 4/5   Hip extension: 5/5 4/5   Knee flexion:  5/5 4/5   Ankle plantarflexion: 5/5 2/5     Special Tests: (pos. or neg.)    (R) (L)   Squat 30 degrees 30 degrees   Varus Stress Test - -   Valgus Stress test - +   Lachman's test - -   Anish's Test - -   Noble's compression test - -   Patella Tilt Test - -   Ruth's sign - -     Palpation: tender to palpation lateral L knee joint line and lateral thigh    Flexibility:    90/90 HS length: R = 30  degrees ; L =  45 degrees       PT reviewed FOTO scores for Gerda Lai on 2/7/18  FOTO score: 47    Functional Limitations Reports - G Codes  Category: mobility  Tool: FOTO      Current ():  CK  Goal (): CK        TREATMENT:  Therapeutic " exercise: Gerda received therapeutic exercises to develop strength and endurance, flexibility for 5 minutes including: hamstring stretch, bridges, quad sets    Pt. Education: Instructed pt. regarding: Proper technique with all exercises, diagnosis, prognosis, goals, and POC. Pt demo good understanding of the education provided. Gerda demonstrated good return demonstration of activities. No cultural, environmental, or spiritual barriers identified to treatment or learning.    HEP2Go Code: [id number]    Assessment   Patient is a 84 y.o. female referred to outpatient physical therapy who presents with a PT diagnosis of L knee pain demonstrating joint dysfunction and functional limitation as described below. Level of complexity is low;  based on patient's past medical history including the below co-morbidities and personal factors; functional limitations, and clinical presentation directly impacting his/her plan of care. Pt demonstrates good rehab potential. Pt will benefit from physcial therapy services in order to maximize pain free functional mobility. The following goals were discussed with the patient and patient is in agreement with them as to be addressed in the treatment plan. Pt was given a HEP consisting of hamstring stretch, bridges, and quad sets. Pt verbally understood the instructions as they were given and demonstrated proper form and technique during therapy. Pt was advised to perform these exercises free of pain, and to stop performing them if pain occurs.       History  Co-morbidities and personal factors that may impact the plan of care Examination  Body Structures and Functions, activity limitations and participation restrictions that may impact the plan of care Clinical Presentation   Decision Making/ Complexity Score   Co-morbidities:   Neck and LBP            Personal Factors:   Sedentary lifestyle Body Regions: B knees, low back, neck    Body Systems: musculoskeletal      Activity limitations:  sitting, walking      Participation Restrictions: going to movies, exerciding       stable   low       Medical necessity is demonstrated by the following IMPAIRMENTS/PROBLEMS LIST:    1) Pain limiting function   2) Gait abnormality   3) Gluteus medius/quadricep muscle weakness   4) Decreased flexibility   5) Decreased ROM   6) Decreased exercise ability   7) Lack of HEP    GOALS:   Short Term Goals:  4 weeks  1. Patient will be proficient and compliant with HEP.  2. Decrease L knee pain at worst to no greater than 5/10.  3. Pt will be able to stand up from seated position after sitting through a movie without difficulty.  4. Pt will be able to ambulate >2 blocks without complaints of pain.    Long Term Goals: 12 weeks  1. Pt will be able to ambulate >5 blocks without complaints of pain.  2. Decrease L knee pain at worst to no greater than 3/10.  3. Pt will be able to ascend/descend a flight of stairs without complaints of pain.    Plan     Pt will be treated by physical therapy 1-3 times a week for 12 weeks for Pt education, HEP, therapeutic exercises, neuromuscular re-education, soft tissue and joint mobilizations; modalities prn to achieve established goals. Gerda may at times be seen by a PTA as part of the Rehab Team.     I certify the need for these services furnished under this plan of treatment and while under my care.  ______________________________ Physician/Referring Practitioner  Date of Signature    Amy Ortiz, PT, DPT

## 2018-02-14 ENCOUNTER — OUTPATIENT CASE MANAGEMENT (OUTPATIENT)
Dept: ADMINISTRATIVE | Facility: OTHER | Age: 83
End: 2018-02-14

## 2018-02-14 NOTE — PROGRESS NOTES
1/31/18  Chart reviewed in [facility] no new ed visits or hospitalizations    Progress towards goals:    LT goal 1. Patient/caregiver will accept life style changes to manage and improve symptoms of arthritis prior to discharge from [facility]. - Priority: high    ST goal 1Patient/caregiver will verbalize 1 noninvasive pain relief measures to help manage the pain within 2 weeks.   met  Pt is attending outpatient physical therapy and states she is doing a little better.  The shot in the knee helped a little but not as expected  Pt states appetite is stable and she feels a little better from an energy stand point.  Denies any falls but still states legs are not as strong.  Pt is aware of fall precautions and states she is very cautious.  Denies any new concerns.  Advised that case closure today and she verbalized understanding of that plan           Clinical Reference Documents Added to Patient Instructions       Document    EATING HEART-HEALTHY FOODS (ENGLISH)    EATING THE RIGHT NUMBER OF CALORIES (8196-1890 GUIDELINES)  (ENGLISH)    FISH OIL, OMEGA-3 FATTY ACIDS CAPSULES (OTC) (ENGLISH)    MYPLATE WORKSHEET: 1,800 CALORIES (ENGLISH)

## 2018-02-15 ENCOUNTER — CLINICAL SUPPORT (OUTPATIENT)
Dept: REHABILITATION | Facility: HOSPITAL | Age: 83
End: 2018-02-15
Payer: MEDICARE

## 2018-02-15 DIAGNOSIS — R29.898 LEFT LEG WEAKNESS: ICD-10-CM

## 2018-02-15 DIAGNOSIS — G89.29 CHRONIC PAIN OF LEFT KNEE: Primary | ICD-10-CM

## 2018-02-15 DIAGNOSIS — M25.562 CHRONIC PAIN OF LEFT KNEE: Primary | ICD-10-CM

## 2018-02-15 PROCEDURE — 97110 THERAPEUTIC EXERCISES: CPT

## 2018-02-15 NOTE — PROGRESS NOTES
"                                                  Physical Therapy Daily Note     Date: 02/15/2018  Name: Gerda Lai  Clinic Number: [id number]  Diagnosis:   Encounter Diagnoses   Name Primary?    Left leg weakness     Chronic pain of left knee Yes     Physician: Bianca Hu PA-C    Precautions: standard    Evaluation Date: 18  Start Time: 1511  Stop Time: 1600  Visit # authorized:   Authorization period: 18  Plan of care expiration: 18    Hx of present illness: Knee pain began insidiously years ago. 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 Pt was already in therapy when she twisted her knee a couple months ago and did not return to treatment. She had only been to 2 sessions when she twisted her knee. Pt has hx of R hip replacement.        Subjective     Pt reports is in a lot of pain pre treatment- did not verbalize a number. Pt reports she attempted HEP 2x since initial evaluation, however, the day after she felt worse. Pt reports she thinks the hamstring stretch was to blame. Pt reports that she has felt her L knee give out on her 3x at home since evaluation, however, she has not fallen. Pt reports she feels very nervous about this fact.    Objective       Patient received individual therapy to increase strength, endurance, ROM and flexibility with activities as follows:     Gerda received therapeutic exercises to develop strength, endurance, ROM and flexibility for 49 minutes includin/15/2018   Quad set 20x6"   Hamstring stretch 3x30" L   bridges 2x10 (one with add squeeze)   Tandem stance R leg back 2x30"   Calf raises 2x10   Standing calf stretch x1 min B   Leg press 60# 2x15   Standing hip abduction 2x10 B     Pt declined ice to L knee post treatment.     Written Home Exercises Provided: suri  Pt demo good understanding of the education provided. Gerda demonstrated good return demonstration of activities.     Education provided: Pt " educated on the difference between muscle burning, stretching sensation, and joint pain. Pt educated on all new clinic exercises. Pt educated that PT can increase strength and ROM but cannot change bony anatomy. Pt educated on POC and goals for PT.  Gerda verbalized good understanding of education provided.   No spiritual or educational barriers to learning identified.    Assessment   Pt tolerated first full treatment session well with no complaints of pain. After review of HEP, patient able to perform home exercises without pain. Pt required verbal and tactile cues for proper technique during all new exercises as well as hamstring stretch. Added various LE strengthening exercises and tandem balance exercise to increase strength and stability. Attempted to add SLR, however, exercise increased pain so it was halted. Pt will continue to benefit from skilled PT in order to decrease pain, increase LLE strength and ROM, and to improve functional mobility.      Medical necessity is demonstrated by the following IMPAIRMENTS/PROBLEMS LIST:    1) Pain limiting function   2) Gait abnormality   3) Gluteus medius/quadricep muscle weakness   4) Decreased flexibility   5) Decreased ROM   6) Decreased exercise ability   7) Lack of HEP    GOALS:   Short Term Goals:  4 weeks  1. Patient will be proficient and compliant with HEP.  2. Decrease L knee pain at worst to no greater than 5/10.  3. Pt will be able to stand up from seated position after sitting through a movie without difficulty.  4. Pt will be able to ambulate >2 blocks without complaints of pain.    Long Term Goals: 12 weeks  1. Pt will be able to ambulate >5 blocks without complaints of pain.  2. Decrease L knee pain at worst to no greater than 3/10.  3. Pt will be able to ascend/descend a flight of stairs without complaints of pain.    Plan     Pt will be treated by physical therapy 1-3 times a week for 12 weeks for Pt education, HEP, therapeutic exercises,  neuromuscular re-education, soft tissue and joint mobilizations; modalities prn to achieve established goals. Gerda may at times be seen by a PTA as part of the Rehab Team. Progress LLE strengthening/ROM as tolerated.    I certify the need for these services furnished under this plan of treatment and while under my care.    Amy Ortiz, PT, DPT

## 2018-02-20 ENCOUNTER — CLINICAL SUPPORT (OUTPATIENT)
Dept: REHABILITATION | Facility: HOSPITAL | Age: 83
End: 2018-02-20
Payer: MEDICARE

## 2018-02-20 DIAGNOSIS — M25.562 CHRONIC PAIN OF LEFT KNEE: Primary | ICD-10-CM

## 2018-02-20 DIAGNOSIS — R29.898 LEFT LEG WEAKNESS: ICD-10-CM

## 2018-02-20 DIAGNOSIS — G89.29 CHRONIC PAIN OF LEFT KNEE: Primary | ICD-10-CM

## 2018-02-20 PROCEDURE — 97110 THERAPEUTIC EXERCISES: CPT

## 2018-02-20 NOTE — PROGRESS NOTES
"                                                  Physical Therapy Daily Note     Date: 2018  Name: Gerda Lai  Clinic Number: [id number]  Diagnosis:   Encounter Diagnoses   Name Primary?    Left leg weakness     Chronic pain of left knee Yes     Physician: Bianca Hu PA-C    Precautions: standard    Evaluation Date: 18  Start Time: 1400  Stop Time: 1500  Visit # authorized: 3/20  Authorization period: 18  Plan of care expiration: 18    Hx of present illness: Knee pain began insidiously years ago. 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 Pt was already in therapy when she twisted her knee a couple months ago and did not return to treatment. She had only been to 2 sessions when she twisted her knee. Pt has hx of R hip replacement.        Subjective     Pt reports her knee is "okay" today, does not verbalize number. Pt reports that she feels stronger and even came to therapy alone this session. Pt feels like she is improving for the most part but still feels unsteady and nervous about falling.    Objective       Patient received individual therapy to increase strength, endurance, ROM and flexibility with activities as follows:     Gerda received therapeutic exercises to develop strength, endurance, ROM and flexibility for 55 minutes includin/20/2018   Quad set 10x6" L   Hamstring stretch 3x30" L   SLR 2x10   clamshells GTB 3x10 L   Hip adduction machine 45# 2x10   Hip abduction machine 40# 2x10   Feet together EO/EC X30" each   Bridges with add squeeze 3x10   Tandem stance R leg back 2x30"   Calf raises 2x15   Standing calf stretch x1 min B   Leg press 70# 2x15   Standing hip abduction 2x10 B np     Pt declined ice to L knee post treatment.     Written Home Exercises Provided: no new exercises provided this session  Pt demo good understanding of the education provided. Gerda demonstrated good return demonstration of activities.     Education " provided: Pt educated on the difference between muscle burning, stretching sensation, and joint pain. Pt educated on all new clinic exercises. Pt educated that PT can increase strength and ROM but cannot change bony anatomy. Pt educated on POC and goals for PT.  Gerda verbalized good understanding of education provided.   No spiritual or educational barriers to learning identified.    Assessment   Pt jm improved strength during leg press and clamshells and was able to tolerate SLR this session. Added hip abduction/adduction machines to treatment to increase LLE strength. Added feet together EO/EC on foam to program to improve balance. Pt required verbal cues for all new exercises. Pt will continue to benefit from skilled PT in order to decrease pain, increase LLE strength and ROM, and to improve functional mobility.      Medical necessity is demonstrated by the following IMPAIRMENTS/PROBLEMS LIST:    1) Pain limiting function   2) Gait abnormality   3) Gluteus medius/quadricep muscle weakness   4) Decreased flexibility   5) Decreased ROM   6) Decreased exercise ability   7) Lack of HEP    GOALS:   Short Term Goals:  4 weeks  1. Patient will be proficient and compliant with HEP.  2. Decrease L knee pain at worst to no greater than 5/10.  3. Pt will be able to stand up from seated position after sitting through a movie without difficulty.  4. Pt will be able to ambulate >2 blocks without complaints of pain.    Long Term Goals: 12 weeks  1. Pt will be able to ambulate >5 blocks without complaints of pain.  2. Decrease L knee pain at worst to no greater than 3/10.  3. Pt will be able to ascend/descend a flight of stairs without complaints of pain.    Plan     Pt will be treated by physical therapy 1-3 times a week for 12 weeks for Pt education, HEP, therapeutic exercises, neuromuscular re-education, soft tissue and joint mobilizations; modalities prn to achieve established goals. Gerda may at times be seen by a PTA  as part of the Rehab Team. Progress LLE strengthening/ROM as tolerated.    I certify the need for these services furnished under this plan of treatment and while under my care.    Amy Ortiz, PT, DPT

## 2018-02-21 ENCOUNTER — OFFICE VISIT (OUTPATIENT)
Dept: FAMILY MEDICINE | Facility: CLINIC | Age: 83
End: 2018-02-21
Payer: MEDICARE

## 2018-02-21 VITALS
DIASTOLIC BLOOD PRESSURE: 44 MMHG | WEIGHT: 144.81 LBS | BODY MASS INDEX: 25.66 KG/M2 | HEIGHT: 63 IN | SYSTOLIC BLOOD PRESSURE: 100 MMHG | HEART RATE: 60 BPM | TEMPERATURE: 98 F

## 2018-02-21 DIAGNOSIS — C50.011 MALIGNANT NEOPLASM OF NIPPLE OF RIGHT BREAST IN FEMALE, ESTROGEN RECEPTOR POSITIVE: Primary | ICD-10-CM

## 2018-02-21 DIAGNOSIS — Z17.0 MALIGNANT NEOPLASM OF NIPPLE OF RIGHT BREAST IN FEMALE, ESTROGEN RECEPTOR POSITIVE: Primary | ICD-10-CM

## 2018-02-21 PROCEDURE — 99214 OFFICE O/P EST MOD 30 MIN: CPT | Mod: S$PBB,,, | Performed by: FAMILY MEDICINE

## 2018-02-21 PROCEDURE — 1159F MED LIST DOCD IN RCRD: CPT | Mod: ,,, | Performed by: FAMILY MEDICINE

## 2018-02-21 PROCEDURE — 99213 OFFICE O/P EST LOW 20 MIN: CPT | Mod: PBBFAC,PO | Performed by: FAMILY MEDICINE

## 2018-02-21 PROCEDURE — 99999 PR PBB SHADOW E&M-EST. PATIENT-LVL III: CPT | Mod: PBBFAC,,, | Performed by: FAMILY MEDICINE

## 2018-02-21 PROCEDURE — 1125F AMNT PAIN NOTED PAIN PRSNT: CPT | Mod: ,,, | Performed by: FAMILY MEDICINE

## 2018-02-21 NOTE — PROGRESS NOTES
Subjective:       Patient ID: Gerda Lai is a 85 y.o. female.    Chief Complaint: blood pressure check    Disclaimer: This note has been generated using voice-recognition software. There may be typographical errors that have been missed during proof-reading    86 yo presents today for follow up of hypertension.  She has noted blood pressure readings to be low recently, with systolics in 100-110 range and diastolics in 70s.  She attributes this to being sedentary from severe pain involving left knee, present for several months.  She has a pending appt with Orthopedics next week      Review of Systems   Constitutional: Positive for activity change. Negative for fatigue and unexpected weight change.   Respiratory: Negative for chest tightness and shortness of breath.    Cardiovascular: Negative for chest pain, palpitations and leg swelling.   Musculoskeletal: Positive for arthralgias.   Neurological: Negative for dizziness, weakness and light-headedness.       Objective:      Physical Exam   Constitutional: She is oriented to person, place, and time. She appears well-developed and well-nourished. No distress.   Neck: Normal range of motion. No JVD present. No thyromegaly present.   Cardiovascular: Normal rate and regular rhythm.    No murmur heard.  Pulmonary/Chest: Effort normal and breath sounds normal. She has no wheezes. She has no rales.   Musculoskeletal: She exhibits no edema.   Lymphadenopathy:     She has no cervical adenopathy.   Neurological: She is alert and oriented to person, place, and time.       Assessment:         Hypertension    Plan:       1.  Stop Amlodipine, continue other medications  2.  Continue to monitor BP, f/u in one month or earlier if no significant changes in BP

## 2018-02-22 ENCOUNTER — TELEPHONE (OUTPATIENT)
Dept: RHEUMATOLOGY | Facility: CLINIC | Age: 83
End: 2018-02-22

## 2018-02-22 ENCOUNTER — CLINICAL SUPPORT (OUTPATIENT)
Dept: REHABILITATION | Facility: HOSPITAL | Age: 83
End: 2018-02-22
Payer: MEDICARE

## 2018-02-22 DIAGNOSIS — R29.898 LEFT LEG WEAKNESS: ICD-10-CM

## 2018-02-22 DIAGNOSIS — G89.29 CHRONIC PAIN OF LEFT KNEE: Primary | ICD-10-CM

## 2018-02-22 DIAGNOSIS — M25.562 CHRONIC PAIN OF LEFT KNEE: Primary | ICD-10-CM

## 2018-02-22 PROCEDURE — 97110 THERAPEUTIC EXERCISES: CPT

## 2018-02-27 ENCOUNTER — OUTPATIENT CASE MANAGEMENT (OUTPATIENT)
Dept: ADMINISTRATIVE | Facility: OTHER | Age: 83
End: 2018-02-27

## 2018-02-27 ENCOUNTER — CLINICAL SUPPORT (OUTPATIENT)
Dept: REHABILITATION | Facility: HOSPITAL | Age: 83
End: 2018-02-27
Payer: MEDICARE

## 2018-02-27 DIAGNOSIS — R29.898 LEFT LEG WEAKNESS: ICD-10-CM

## 2018-02-27 DIAGNOSIS — G89.29 CHRONIC PAIN OF LEFT KNEE: Primary | ICD-10-CM

## 2018-02-27 DIAGNOSIS — M25.562 CHRONIC PAIN OF LEFT KNEE: Primary | ICD-10-CM

## 2018-02-27 PROCEDURE — 97110 THERAPEUTIC EXERCISES: CPT

## 2018-02-27 NOTE — PROGRESS NOTES
Please note this patient was mailed an Outpatient Care Management Patient Satisfaction Discharge Survey on 2/27/18.    Please contact [facility] at ext. [phone] with any questions.    Thank you,    Pam Luong, [facility]  Outpatient Complex Care Mgmt  Ext [phone]

## 2018-02-27 NOTE — PROGRESS NOTES
"                                                  Physical Therapy Daily Note     Date: 2018  Name: Gerda Lai  Clinic Number: [id number]  Diagnosis:   Encounter Diagnoses   Name Primary?    Left leg weakness     Chronic pain of left knee Yes     Physician: Bianca Hu PA-C    Precautions: standard    Evaluation Date: 18  Start Time: 1400  Stop Time: 1500  Visit # authorized:   Authorization period: 18  Plan of care expiration: 18    Hx of present illness: Knee pain began insidiously years ago. 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 Pt was already in therapy when she twisted her knee a couple months ago and did not return to treatment. She had only been to 2 sessions when she twisted her knee. Pt has hx of R hip replacement.      Subjective     Pt reports she thinks therapy is working and she would like to continue with treatment. Pt has an appointment with Dr. [facility] on Thursday and is interested to see what he thinks. Pt unsure of whether her issues can all be solved with therapy, but she doesn't think it's hurting and would like to be as strong as possible should she need surgery. Pt reports she felt immense pain yesterday when she lifted her leg up to clip her toenails and is unsure why.    Objective     Pt used supplied WC for transportation inside the clinic but ambulated with single point cane once inside the gym.    Patient received individual therapy to increase strength, endurance, ROM and flexibility with activities as follows:     Gerda received therapeutic exercises to develop strength, endurance, ROM and flexibility for 45 minutes includin/27/2018   Quad set 15x6" L   Hamstring stretch 3x30" L   SLR 3x10   clamshells GTB 3x10 L   Hip adduction machine 50# 3x10   Hip abduction machine 45# 3x10   Feet together EC 2X30" each np   Bridges with add squeeze 3x10    Tandem stance R leg back on/off foam 2x30"   Calf raises 2x15 "   Standing calf stretch x1 min B   Sl leg press 45# x20   Leg press 65# x30   Standing hip abduction 2x10 B      Pt declined ice to L knee post treatment.     Written Home Exercises Provided: no new exercises provided this session  Pt demo good understanding of the education provided. Gerda demonstrated good return demonstration of activities.     Education provided: Pt educated on the difference between muscle burning, stretching sensation, and joint pain. Pt educated on all new clinic exercises. Pt educated that PT can increase strength and ROM but cannot change bony anatomy. Pt educated on POC and goals for PT.  Gerda verbalized good understanding of education provided.   No spiritual or educational barriers to learning identified.    PT reviewed FOTO scores for Gerda Lai on 2/27/18  FOTO score: 47    Functional Limitations Reports - G Codes  Category: mobility  Tool: FOTO      Current ():  CK  Goal (): CK      Assessment   Pt demos improved LLE strength with leg press, SL leg press, and hip adduction machines. Pt reports some improvements in mobility and pain but according to FOTO, is still between 40% and 60% limited in mobility. Pt will continue to benefit from skilled PT in order to decrease pain, increase LLE strength and ROM, and to improve functional mobility.      Medical necessity is demonstrated by the following IMPAIRMENTS/PROBLEMS LIST:    1) Pain limiting function   2) Gait abnormality   3) Gluteus medius/quadricep muscle weakness   4) Decreased flexibility   5) Decreased ROM   6) Decreased exercise ability   7) Lack of HEP    GOALS:   Short Term Goals:  4 weeks  1. Patient will be proficient and compliant with HEP. *GOAL MET 2/27/18  2. Decrease L knee pain at worst to no greater than 5/10.  3. Pt will be able to stand up from seated position after sitting through a movie without difficulty.  4. Pt will be able to ambulate >2 blocks without complaints of pain.    Long Term Goals:  12 weeks  1. Pt will be able to ambulate >5 blocks without complaints of pain.  2. Decrease L knee pain at worst to no greater than 3/10.  3. Pt will be able to ascend/descend a flight of stairs without complaints of pain.    Plan     Pt will be treated by physical therapy 1-3 times a week for 12 weeks for Pt education, HEP, therapeutic exercises, neuromuscular re-education, soft tissue and joint mobilizations; modalities prn to achieve established goals. Gerda may at times be seen by a PTA as part of the Rehab Team. Progress LLE strengthening/ROM as tolerated.    I certify the need for these services furnished under this plan of treatment and while under my care.    Amy Ortiz, PT, DPT

## 2018-03-01 ENCOUNTER — OFFICE VISIT (OUTPATIENT)
Dept: ORTHOPEDICS | Facility: CLINIC | Age: 83
End: 2018-03-01
Payer: MEDICARE

## 2018-03-01 VITALS — WEIGHT: 130.75 LBS | HEIGHT: 63 IN | BODY MASS INDEX: 23.17 KG/M2

## 2018-03-01 DIAGNOSIS — M17.0 PRIMARY OSTEOARTHRITIS OF BOTH KNEES: Primary | ICD-10-CM

## 2018-03-01 PROCEDURE — 99212 OFFICE O/P EST SF 10 MIN: CPT | Mod: PBBFAC,25 | Performed by: ORTHOPAEDIC SURGERY

## 2018-03-01 PROCEDURE — 99214 OFFICE O/P EST MOD 30 MIN: CPT | Mod: 25,S$PBB,, | Performed by: ORTHOPAEDIC SURGERY

## 2018-03-01 PROCEDURE — 20610 DRAIN/INJ JOINT/BURSA W/O US: CPT | Mod: PBBFAC | Performed by: ORTHOPAEDIC SURGERY

## 2018-03-01 PROCEDURE — 99999 PR PBB SHADOW E&M-EST. PATIENT-LVL II: CPT | Mod: PBBFAC,,, | Performed by: ORTHOPAEDIC SURGERY

## 2018-03-01 RX ORDER — ALISKIREN HEMIFUMARATE 300 MG/1
300 TABLET, FILM COATED ORAL DAILY
COMMUNITY
Start: 2018-02-26 | End: 2019-09-18 | Stop reason: SDUPTHER

## 2018-03-01 RX ORDER — TRIAMCINOLONE ACETONIDE 40 MG/ML
40 INJECTION, SUSPENSION INTRA-ARTICULAR; INTRAMUSCULAR
Status: DISCONTINUED | OUTPATIENT
Start: 2018-03-01 | End: 2018-03-01 | Stop reason: HOSPADM

## 2018-03-01 RX ORDER — TRIAMCINOLONE ACETONIDE 40 MG/ML
60 INJECTION, SUSPENSION INTRA-ARTICULAR; INTRAMUSCULAR
Status: DISCONTINUED | OUTPATIENT
Start: 2018-03-01 | End: 2018-03-01 | Stop reason: HOSPADM

## 2018-03-01 RX ADMIN — TRIAMCINOLONE ACETONIDE 40 MG: 40 INJECTION, SUSPENSION INTRA-ARTICULAR; INTRAMUSCULAR at 03:03

## 2018-03-01 RX ADMIN — TRIAMCINOLONE ACETONIDE 60 MG: 40 INJECTION, SUSPENSION INTRA-ARTICULAR; INTRAMUSCULAR at 03:03

## 2018-03-01 NOTE — PROCEDURES
Large Joint Aspiration/Injection  Date/Time: 3/1/2018 3:34 PM  Performed by: OCHSNER, JOHN L. JR  Authorized by: OCHSNER, JOHN L. JR     Consent Done?:  Yes (Verbal)  Indications:  Pain  Procedure site marked: Yes    Timeout: Prior to procedure the correct patient, procedure, and site was verified      Location:  Knee  Site:  R knee  Prep: Patient was prepped and draped in usual sterile fashion    Ultrasonic Guidance for needle placement: No  Needle size:  22 G  Medications:  40 mg triamcinolone acetonide 40 mg/mL; 60 mg triamcinolone acetonide 40 mg/mL  Patient tolerance:  Patient tolerated the procedure well with no immediate complications

## 2018-03-01 NOTE — PROCEDURES
Large Joint Aspiration/Injection  Date/Time: 3/1/2018 3:34 PM  Performed by: OCHSNER, JOHN L. JR  Authorized by: OCHSNER, JOHN L. JR     Consent Done?:  Yes (Verbal)  Indications:  Pain  Procedure site marked: Yes    Timeout: Prior to procedure the correct patient, procedure, and site was verified      Location:  Knee  Site:  L knee  Prep: Patient was prepped and draped in usual sterile fashion    Ultrasonic Guidance for needle placement: No  Needle size:  18 G  Approach:  Anterolateral  Medications:  40 mg triamcinolone acetonide 40 mg/mL  Aspirate amount (ml):  40  Aspirate:  Clear  Patient tolerance:  Patient tolerated the procedure well with no immediate complications

## 2018-03-01 NOTE — PROGRESS NOTES
HPI:    Gerda Lai is a 85 y.o. female who is here today for   Chief Complaint   Patient presents with    Left Knee - Pain    Right Knee - Pain   .  Left knee has acute and significant effusion.  Euflexxa was no benefit.    Duration: 12 months  Intensity: severe  Character of pain: sharp  Location: She reports that the pain is predominately  medial  Patient's pain increases with activity.  Pain is increased with weightbearing and interferes with activities of daily living.    She has tried conservative management including NSAIDS, injections, and activity modification without relief.    She has discussed options with her family and wishes to schedule TKA.     PMSFSH reviewed per clinic record       Past Medical History:   Diagnosis Date    Arthritis     Basal cell carcinoma 09/2016    right post auricular neck     Breast cancer     Cataract     Fibromyalgia     Hyperlipidemia     Hypertension     Personal history of colonic polyps     SCC (squamous cell carcinoma) 2015    R chest    SCC (squamous cell carcinoma) 2016    left medial shoulder    SCC (squamous cell carcinoma) 2017    left knee    Squamous cell carcinoma 2015    right forearm    Thyroid disease     Vaginitis           Current Outpatient Prescriptions:     acetaminophen (TYLENOL) 650 MG TbSR, Take 650 mg by mouth as needed., Disp: , Rfl:     ascorbic acid (VITAMIN C) 100 MG tablet, Take 100 mg by mouth once daily., Disp: , Rfl:     B INFANTIS/B ANI/B JOSE/B BIFID (PROBIOTIC 4X ORAL), Take 1 tablet by mouth daily, Disp: , Rfl:     chlorthalidone (HYGROTEN) 25 MG Tab, Take 12.5 mg by mouth once daily. , Disp: , Rfl: 11    coenzyme Q10 (CO Q-10) 100 mg capsule, Take 100 mg by mouth once daily., Disp: , Rfl:     fluticasone (FLONASE) 50 mcg/actuation nasal spray, USE 1 SPRAY IN EACH NOSTRIL ONCE DAILY (Patient taking differently: as needed. ), Disp: 16 g, Rfl: 3    glucosamine-chondroitin 500-400 mg tablet, Take 1 tablet by mouth 2  (two) times daily. , Disp: , Rfl:     labetalol (NORMODYNE) 100 MG tablet, TAKE 1 AND 1/2 TABLETS BY MOUTH TWICE A DAY, Disp: 135 tablet, Rfl: 1    LORazepam (ATIVAN) 0.5 MG tablet, TAKE 1/2 TABLET BY MOUTH TWICE A DAY, Disp: 60 tablet, Rfl: 1    meloxicam (MOBIC) 7.5 MG tablet, TAKE 1 TABLET (7.5 MG TOTAL) BY MOUTH ONCE DAILY., Disp: 30 tablet, Rfl: 0    multivitamin capsule, Take 1 capsule by mouth once daily., Disp: , Rfl:     omeprazole (PRILOSEC OTC) 20 MG tablet, Take 20 mg by mouth once daily.  , Disp: , Rfl:     pravastatin (PRAVACHOL) 40 MG tablet, TAKE 1 TABLET (40 MG TOTAL) BY MOUTH ONCE DAILY., Disp: 90 tablet, Rfl: 3    TEKTURNA 300 mg Tab, 300 mg once daily. , Disp: , Rfl:     triamcinolone acetonide 0.1% (KENALOG) 0.1 % cream, AAA bid for itching; not more than 2 weeks straight in same location, Disp: 60 g, Rfl: 3    UNABLE TO FIND, once daily. OTC EYE DROP; Systane, Disp: , Rfl:      Review of patient's allergies indicates:   Allergen Reactions    Voltaren [diclofenac sodium] Other (See Comments)     Drops blood pressure    Clarithromycin Other (See Comments)     Weak, extreme fatigue. dizziness    Losartan Rash    Flexeril [cyclobenzaprine] Other (See Comments)     Dizziness    Iodine and iodide containing products     Lisinopril Other (See Comments)     Cough and sensation of throat swelling/?angioedema    Metoprolol Swelling     Tightness in throat    Sulfa (sulfonamide antibiotics) Rash    Tramadol      Dizzy and weak    Verapamil (bulk) Palpitations        ROS  Constitutional: Negative for fever, Negative for weight loss  HENT Negative for congestion  Cardiovascular: Negative chest pain  Respiratory: Negative Shortness of breath  Heme: Negative excessive bleeding  Skin:NegativeItching, Negative breakdown  Musculoskeletal:Positive for back pain, Positive for joint pain, Positive muscle pain, Positive muscle weakness  Neurological: Negative for numbness and paresthesias  "  Psychiatric/Behavioral: Negative altered mental status, Negative for depression    Physical Exam:   Ht 5' 3" (1.6 m)   Wt 59.3 kg (130 lb 11.7 oz)   BMI 23.16 kg/m²   General appearance: This is a well-developed, Well nourished female No obvious acute distress.  Psychiatric: normal mood and affect;  pleasant and conversant; judgment and thought content normal  Gait is coordinated. Patient has antalgic gait to the bilateral  Cardiovascular: There are no swelling or varicosities present.   Respiratory: normal respiratory effort   Lymphatic: no adenopathy   Neurologic: alert and oriented to person, place, and time   Deep Tendon Reflexes are normal;  Coordination and Balance: Normal   Musculoskeletal   Neck    ROM shows normal flexion and extension and lateral rotation    Palpation: Non-tender    Stability is normal    Strength is normal    Skin is normal without masses and lesions    Sensation is intact to light touch   Back    ROM showsabnormal flexion, extension and     rotation    Palpation shows no masses    Stability is normal    Strength to flexion and extension well maintained    Core strength is diminished    Skin shows no rashes or cafe au lait spots;     Sensation is intact to light touch  Right hip   Range of motion normal    Left Hip  Range of motionnormal    Right Knee  Swelling Mild  TendernessMedial joint line  Range of Motion:   Motion is painfulYes  Crepitance presentYes    Right Leg  Neurologic Intact  Pulses Intact    Left Knee: Swelling Severe  TendernessMedial joint line  Range of Motion:    Motion is painful Yes    Left Leg   Neurologic Intact  Pulses Intact    Radiograph: Show severe degenerative arthritis with subchondral sclerosis, periarticular osteophytes and narrowing of joint space.  Angle: mild valgus    Physical therapy is contraindicated due to potential bone loss on this severe arthritic joint.    Assessment:  Knee arthritis bilateral      She will need to be cleared in " our PreOp center.         .    She  has a past medical history of Arthritis; Basal cell carcinoma (09/2016); Breast cancer; Cataract; Fibromyalgia; Hyperlipidemia; Hypertension; Personal history of colonic polyps; SCC (squamous cell carcinoma) (2015); SCC (squamous cell carcinoma) (2016); SCC (squamous cell carcinoma) (2017); Squamous cell carcinoma (2015); Thyroid disease; and Vaginitis. . We will have to take this into account. She  will be followed by the hospitalist service while in the hospital.       We have gone over the hospitalization and recovery with her as well.  This is typically around 2 weeks on a walker and transition to a cane after that.  She will have home health likely for 3-4 weeks and then transition to outpatient if necessary.  She is agreement with this plan of care and is ready to proceed.  I will see her back for clinical recheck at the 2-week postop mp.  I will see her back for clinical recheck for any other questions or problems as needed and certainly for any other issues in the interim.    We have discussed risks of total knee replacement which include but are not limited to blood clots in the legs that can travel to the lungs (pulmonary embolism). Pulmonary embolism can cause shortness of breath, chest pain, and even shock. Other risks include urinary tract infection, nausea and vomiting (usually related to pain medication), chronic knee pain and stiffness, bleeding into the knee joint, nerve damage, blood vessel injury, and infection of the knee which can require re-operation. Furthermore, the risks of anesthesia include potential heart, lung, kidney, and liver damage.    Have aspirated 40 cc from the left knee.    Patient will talk to family and consider having a knee replacement.

## 2018-03-02 ENCOUNTER — CLINICAL SUPPORT (OUTPATIENT)
Dept: REHABILITATION | Facility: HOSPITAL | Age: 83
End: 2018-03-02
Payer: MEDICARE

## 2018-03-02 DIAGNOSIS — G89.29 CHRONIC PAIN OF LEFT KNEE: Primary | ICD-10-CM

## 2018-03-02 DIAGNOSIS — M25.562 CHRONIC PAIN OF LEFT KNEE: Primary | ICD-10-CM

## 2018-03-02 DIAGNOSIS — R29.898 LEFT LEG WEAKNESS: ICD-10-CM

## 2018-03-02 PROCEDURE — 97110 THERAPEUTIC EXERCISES: CPT

## 2018-03-02 NOTE — PROGRESS NOTES
"                                                  Physical Therapy Daily Note     Date: 2018  Name: Gerda Lai  Clinic Number: [id number]  Diagnosis:   Encounter Diagnoses   Name Primary?    Left leg weakness     Chronic pain of left knee Yes     Physician: Bianca Hu PA-C    Precautions: standard    Evaluation Date: 18  Start Time: 1312  Stop Time: 1400  Visit # authorized:   Authorization period: 18  Plan of care expiration: 18    Hx of present illness: Knee pain began insidiously years ago. 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 Pt was already in therapy when she twisted her knee a couple months ago and did not return to treatment. She had only been to 2 sessions when she twisted her knee. Pt has hx of R hip replacement.      Subjective     Pt reports she went to see Dr. [facility] yesterday and had her knee drained. Pt reports she feels so much better, 0/10 pain pre-treatment. Pt reports she would like to continue therapy and strengthening.     Objective     Pt ambulated into clinic holding single point cane but not using it for ambulation    Patient received individual therapy to increase strength, endurance, ROM and flexibility with activities as follows:     Gerda received therapeutic exercises to develop strength, endurance, ROM and flexibility for 24 minutes includin/02/2018   Quad set 15x6" L np   Hamstring stretch 3x30" L   SLR 3x10   clamshells BTB 3x10 L   Hip adduction machine 50# 3x10   Hip abduction machine 45# 3x10   Feet together EC 2X30" each np   Bridges with add squeeze 3x10    Tandem stance R leg back on/off foam 2x30"   Calf raises 2x15   Standing calf stretch x1 min B   Sl leg press 45# x20   Leg press 65# x30   Standing hip abduction 3x10 B      Pt declined ice to L knee post treatment.     Written Home Exercises Provided: no new exercises provided this session  Pt demo good understanding of the education provided. " Gerda demonstrated good return demonstration of activities.     Education provided: Pt educated on the difference between muscle burning, stretching sensation, and joint pain. Pt educated on all new clinic exercises. Pt educated that PT can increase strength and ROM but cannot change bony anatomy. Pt educated on POC and goals for PT.  Gerda verbalized good understanding of education provided.   No spiritual or educational barriers to learning identified.    PT reviewed FOTO scores for Gerda Lai on 2/27/18  FOTO score: 47    Functional Limitations Reports - G Codes  Category: mobility  Tool: FOTO      Current ():  CK  Goal (): CK      Assessment   Pt ambulated into clinic without use of AD this session and reports improvements in functional mobility since visiting the doctor and having her knee drained. Pt extremely optimistic with decreased pain and improved mobility this session. Pt demos improved LLE strength with leg press and  SL leg press and is making good progress toward goals. Pt will continue to benefit from skilled PT in order to decrease pain, increase LLE strength and ROM, and to improve functional mobility.      Medical necessity is demonstrated by the following IMPAIRMENTS/PROBLEMS LIST:    1) Pain limiting function   2) Gait abnormality   3) Gluteus medius/quadricep muscle weakness   4) Decreased flexibility   5) Decreased ROM   6) Decreased exercise ability   7) Lack of HEP    GOALS:   Short Term Goals:  4 weeks  1. Patient will be proficient and compliant with HEP. *GOAL MET 2/27/18  2. Decrease L knee pain at worst to no greater than 5/10.  3. Pt will be able to stand up from seated position after sitting through a movie without difficulty.  4. Pt will be able to ambulate >2 blocks without complaints of pain.    Long Term Goals: 12 weeks  1. Pt will be able to ambulate >5 blocks without complaints of pain.  2. Decrease L knee pain at worst to no greater than 3/10.  3. Pt will be  able to ascend/descend a flight of stairs without complaints of pain.    Plan     Pt will be treated by physical therapy 1-3 times a week for 12 weeks for Pt education, HEP, therapeutic exercises, neuromuscular re-education, soft tissue and joint mobilizations; modalities prn to achieve established goals. Gerda may at times be seen by a PTA as part of the Rehab Team. Progress LLE strengthening/ROM as tolerated.    I certify the need for these services furnished under this plan of treatment and while under my care.    Amy Ortiz, PT, DPT

## 2018-03-04 ENCOUNTER — OFFICE VISIT (OUTPATIENT)
Dept: URGENT CARE | Facility: CLINIC | Age: 83
End: 2018-03-04
Payer: MEDICARE

## 2018-03-04 VITALS
SYSTOLIC BLOOD PRESSURE: 173 MMHG | BODY MASS INDEX: 23.21 KG/M2 | HEIGHT: 63 IN | HEART RATE: 78 BPM | TEMPERATURE: 100 F | WEIGHT: 131 LBS | RESPIRATION RATE: 16 BRPM | OXYGEN SATURATION: 96 % | DIASTOLIC BLOOD PRESSURE: 68 MMHG

## 2018-03-04 DIAGNOSIS — R05.9 COUGH: ICD-10-CM

## 2018-03-04 DIAGNOSIS — J06.9 VIRAL UPPER RESPIRATORY TRACT INFECTION: Primary | ICD-10-CM

## 2018-03-04 LAB
CTP QC/QA: YES
FLUAV AG NPH QL: NEGATIVE
FLUBV AG NPH QL: NEGATIVE

## 2018-03-04 PROCEDURE — 99214 OFFICE O/P EST MOD 30 MIN: CPT | Mod: S$GLB,,, | Performed by: NURSE PRACTITIONER

## 2018-03-04 PROCEDURE — 87804 INFLUENZA ASSAY W/OPTIC: CPT | Mod: QW,S$GLB,, | Performed by: NURSE PRACTITIONER

## 2018-03-04 RX ORDER — PROMETHAZINE HYDROCHLORIDE AND DEXTROMETHORPHAN HYDROBROMIDE 6.25; 15 MG/5ML; MG/5ML
5 SYRUP ORAL NIGHTLY PRN
Qty: 118 ML | Refills: 0 | Status: ON HOLD | OUTPATIENT
Start: 2018-03-04 | End: 2018-03-06

## 2018-03-04 RX ORDER — BENZONATATE 200 MG/1
200 CAPSULE ORAL 3 TIMES DAILY PRN
Qty: 30 CAPSULE | Refills: 0 | Status: ON HOLD | OUTPATIENT
Start: 2018-03-04 | End: 2018-03-06

## 2018-03-04 NOTE — PROGRESS NOTES
"Subjective:       Patient ID: Gerda Lai is a 85 y.o. female.    Vitals:  height is 5' 3" (1.6 m) and weight is 59.4 kg (131 lb). Her temperature is 99.5 °F (37.5 °C). Her blood pressure is 173/68 (abnormal) and her pulse is 78. Her respiration is 16 and oxygen saturation is 96%.     Chief Complaint: Cough    Cough   This is a new problem. The current episode started yesterday. The problem occurs every few minutes. The cough is productive of sputum. Associated symptoms include headaches and nasal congestion. Pertinent negatives include no chest pain, chills, ear pain, eye redness, fever, myalgias, sore throat, shortness of breath or wheezing. Nothing aggravates the symptoms. Treatments tried: Vapor rub.     Review of Systems   Constitution: Negative for chills, fever and malaise/fatigue.   HENT: Positive for congestion. Negative for ear pain, hoarse voice and sore throat.    Eyes: Negative for discharge and redness.   Cardiovascular: Negative for chest pain, dyspnea on exertion and leg swelling.   Respiratory: Positive for cough and sputum production. Negative for shortness of breath and wheezing.    Musculoskeletal: Negative for myalgias.   Gastrointestinal: Negative for abdominal pain and nausea.   Neurological: Positive for headaches.       Objective:      Physical Exam   Constitutional: She is oriented to person, place, and time. She appears well-developed and well-nourished. She is cooperative.  Non-toxic appearance. She does not appear ill. No distress.   HENT:   Head: Normocephalic and atraumatic.   Right Ear: Hearing, tympanic membrane, external ear and ear canal normal.   Left Ear: Hearing, tympanic membrane, external ear and ear canal normal.   Nose: Nose normal. No mucosal edema, rhinorrhea or nasal deformity. No epistaxis. Right sinus exhibits no maxillary sinus tenderness and no frontal sinus tenderness. Left sinus exhibits no maxillary sinus tenderness and no frontal sinus tenderness. "   Mouth/Throat: Uvula is midline, oropharynx is clear and moist and mucous membranes are normal. No trismus in the jaw. Normal dentition. No uvula swelling. No posterior oropharyngeal erythema.   Eyes: Conjunctivae and lids are normal. No scleral icterus.   Sclera clear bilat   Neck: Trachea normal, full passive range of motion without pain and phonation normal. Neck supple.   Cardiovascular: Normal rate, regular rhythm, normal heart sounds, intact distal pulses and normal pulses.    Pulmonary/Chest: Effort normal and breath sounds normal. No respiratory distress.   Abdominal: Soft. Normal appearance and bowel sounds are normal. She exhibits no distension. There is no tenderness.   Musculoskeletal: Normal range of motion. She exhibits no edema or deformity.   Neurological: She is alert and oriented to person, place, and time. She exhibits normal muscle tone. Coordination normal.   Skin: Skin is warm, dry and intact. She is not diaphoretic. No pallor.   Psychiatric: She has a normal mood and affect. Her speech is normal and behavior is normal. Judgment and thought content normal. Cognition and memory are normal.   Nursing note and vitals reviewed.      Assessment:       1. Viral upper respiratory tract infection    2. Cough        Plan:         Viral upper respiratory tract infection  -     promethazine-dextromethorphan (PROMETHAZINE-DM) 6.25-15 mg/5 mL Syrp; Take 5 mLs by mouth nightly as needed (COUGH).  Dispense: 118 mL; Refill: 0  -     benzonatate (TESSALON) 200 MG capsule; Take 1 capsule (200 mg total) by mouth 3 (three) times daily as needed for Cough.  Dispense: 30 capsule; Refill: 0    Cough  -     POCT Influenza A/B      Patient Instructions   Please drink plenty of fluids.  Please get plenty of rest.  Please return here or go to the Emergency Department for any concerns or worsening of condition.  If you were prescribed antibiotics, please take them to completion.  If you were given wait & see antibiotics,  please wait 5-7 days before taking them, and only take them if your symptoms have worsened or not improved.  If you do begin taking the antibiotics, please take them to completion.  If you were prescribed a narcotic medication, do not drive or operate heavy equipment or machinery while taking these medications.  If you were given a steroid shot in the clinic and have also been given a prescription for a steroid such as Prednisone or a Medrol Dose Pack, please begin taking them tomorrow.  If you do not have Hypertension or any history of palpitations, it is ok to take over the counter Sudafed or Mucinex D or Allegra-D or Claritin-D or Zyrtec-D.  If you do take one of the above, it is ok to combine that with plain over the counter Mucinex or Allegra or Claritin or Zyrtec.  If for example you are taking Zyrtec -D, you can combine that with Mucinex, but not Mucinex-D.  If you are taking Mucinex-D, you can combine that with plain Allegra or Claritin or Zyrtec.   If you do have Hypertension or palpitations, it is safe to take Coricidin HBP for relief of sinus symptoms.  If not allergic, please take over the counter Tylenol (Acetaminophen) and/or Motrin (Ibuprofen) as directed for control of pain and/or fever.  Please follow up with your primary care doctor or specialist as needed.    If you  smoke, please stop smoking.  Viral Upper Respiratory Illness (Adult)  You have a viral upper respiratory illness (URI), which is another term for the common cold. This illness is contagious during the first few days. It is spread through the air by coughing and sneezing. It may also be spread by direct contact (touching the sick person and then touching your own eyes, nose, or mouth). Frequent handwashing will decrease risk of spread. Most viral illnesses go away within 7 to 10 days with rest and simple home remedies. Sometimes the illness may last for several weeks. Antibiotics will not kill a virus, and they are generally not  prescribed for this condition.    Home care  · If symptoms are severe, rest at home for the first 2 to 3 days. When you resume activity, don't let yourself get too tired.  · Avoid being exposed to cigarette smoke (yours or others).  · You may use acetaminophen or ibuprofen to control pain and fever, unless another medicine was prescribed. (Note: If you have chronic liver or kidney disease, have ever had a stomach ulcer or gastrointestinal bleeding, or are taking blood-thinning medicines, talk with your healthcare provider before using these medicines.) Aspirin should never be given to anyone under 18 years of age who is ill with a viral infection or fever. It may cause severe liver or brain damage.  · Your appetite may be poor, so a light diet is fine. Avoid dehydration by drinking 6 to 8 glasses of fluids per day (water, soft drinks, juices, tea, or soup). Extra fluids will help loosen secretions in the nose and lungs.  · Over-the-counter cold medicines will not shorten the length of time youre sick, but they may be helpful for the following symptoms: cough, sore throat, and nasal and sinus congestion. (Note: Do not use decongestants if you have high blood pressure.)  Follow-up care  Follow up with your healthcare provider, or as advised.  When to seek medical advice  Call your healthcare provider right away if any of these occur:  · Cough with lots of colored sputum (mucus)  · Severe headache; face, neck, or ear pain  · Difficulty swallowing due to throat pain  · Fever of 100.4°F (38°C)  Call 911, or get immediate medical care  Call emergency services right away if any of these occur:  · Chest pain, shortness of breath, wheezing, or difficulty breathing  · Coughing up blood  · Inability to swallow due to throat pain  Date Last Reviewed: 9/13/2015  © 0832-8311 [employer]. [address] [place] [address], [place] [address]. All rights reserved. This information is not intended as a substitute for  professional medical care. Always follow your healthcare professional's instructions.

## 2018-03-05 ENCOUNTER — HOSPITAL ENCOUNTER (INPATIENT)
Facility: HOSPITAL | Age: 83
LOS: 6 days | Discharge: HOME | DRG: 194 | End: 2018-03-11
Attending: EMERGENCY MEDICINE | Admitting: EMERGENCY MEDICINE
Payer: MEDICARE

## 2018-03-05 DIAGNOSIS — R10.811 RUQ ABDOMINAL TENDERNESS: ICD-10-CM

## 2018-03-05 DIAGNOSIS — R05.9 COUGH: ICD-10-CM

## 2018-03-05 DIAGNOSIS — R06.02 SOB (SHORTNESS OF BREATH): ICD-10-CM

## 2018-03-05 DIAGNOSIS — J18.9 COMMUNITY ACQUIRED PNEUMONIA, UNSPECIFIED LATERALITY: Primary | ICD-10-CM

## 2018-03-05 LAB
ALBUMIN SERPL BCP-MCNC: 3.2 G/DL
ALP SERPL-CCNC: 78 U/L
ALT SERPL W/O P-5'-P-CCNC: 34 U/L
ANION GAP SERPL CALC-SCNC: 12 MMOL/L
ANISOCYTOSIS BLD QL SMEAR: SLIGHT
AST SERPL-CCNC: 26 U/L
BACTERIA #/AREA URNS AUTO: NORMAL /HPF
BASOPHILS # BLD AUTO: 0.03 K/UL
BASOPHILS NFR BLD: 0.2 %
BILIRUB SERPL-MCNC: 2.1 MG/DL
BILIRUB UR QL STRIP: NEGATIVE
BNP SERPL-MCNC: 60 PG/ML
BUN SERPL-MCNC: 31 MG/DL
CALCIUM SERPL-MCNC: 9.5 MG/DL
CHLORIDE SERPL-SCNC: 104 MMOL/L
CLARITY UR REFRACT.AUTO: CLEAR
CO2 SERPL-SCNC: 21 MMOL/L
COLOR UR AUTO: YELLOW
CREAT SERPL-MCNC: 1 MG/DL
DIFFERENTIAL METHOD: ABNORMAL
EOSINOPHIL # BLD AUTO: 0 K/UL
EOSINOPHIL NFR BLD: 0.1 %
ERYTHROCYTE [DISTWIDTH] IN BLOOD BY AUTOMATED COUNT: 12.3 %
EST. GFR  (AFRICAN AMERICAN): 59.4 ML/MIN/1.73 M^2
EST. GFR  (NON AFRICAN AMERICAN): 51.5 ML/MIN/1.73 M^2
FLUAV AG SPEC QL IA: NEGATIVE
FLUBV AG SPEC QL IA: NEGATIVE
GLUCOSE SERPL-MCNC: 125 MG/DL
GLUCOSE UR QL STRIP: NEGATIVE
HCT VFR BLD AUTO: 30.6 %
HGB BLD-MCNC: 10.6 G/DL
HGB UR QL STRIP: NEGATIVE
IMM GRANULOCYTES # BLD AUTO: 0.19 K/UL
IMM GRANULOCYTES NFR BLD AUTO: 1.1 %
KETONES UR QL STRIP: NEGATIVE
LACTATE SERPL-SCNC: 1 MMOL/L
LEUKOCYTE ESTERASE UR QL STRIP: NEGATIVE
LIPASE SERPL-CCNC: 12 U/L
LYMPHOCYTES # BLD AUTO: 1.1 K/UL
LYMPHOCYTES NFR BLD: 6.7 %
MCH RBC QN AUTO: 33.8 PG
MCHC RBC AUTO-ENTMCNC: 34.6 G/DL
MCV RBC AUTO: 98 FL
MICROSCOPIC COMMENT: NORMAL
MONOCYTES # BLD AUTO: 1.9 K/UL
MONOCYTES NFR BLD: 11.4 %
NEUTROPHILS # BLD AUTO: 13.6 K/UL
NEUTROPHILS NFR BLD: 80.5 %
NITRITE UR QL STRIP: NEGATIVE
NRBC BLD-RTO: 0 /100 WBC
PH UR STRIP: 5 [PH] (ref 5–8)
PLATELET # BLD AUTO: 197 K/UL
PLATELET BLD QL SMEAR: ABNORMAL
PMV BLD AUTO: 9.4 FL
POTASSIUM SERPL-SCNC: 3.9 MMOL/L
PROCALCITONIN SERPL IA-MCNC: 0.47 NG/ML
PROT SERPL-MCNC: 6.7 G/DL
PROT UR QL STRIP: NEGATIVE
RBC # BLD AUTO: 3.14 M/UL
RBC #/AREA URNS AUTO: 1 /HPF (ref 0–4)
SODIUM SERPL-SCNC: 137 MMOL/L
SP GR UR STRIP: 1.02 (ref 1–1.03)
SPECIMEN SOURCE: NORMAL
SQUAMOUS #/AREA URNS AUTO: 0 /HPF
TROPONIN I SERPL DL<=0.01 NG/ML-MCNC: 0.01 NG/ML
URN SPEC COLLECT METH UR: NORMAL
UROBILINOGEN UR STRIP-ACNC: NEGATIVE EU/DL
WBC # BLD AUTO: 16.9 K/UL
WBC #/AREA URNS AUTO: 1 /HPF (ref 0–5)

## 2018-03-05 PROCEDURE — 25000003 PHARM REV CODE 250

## 2018-03-05 PROCEDURE — 87632 RESP VIRUS 6-11 TARGETS: CPT

## 2018-03-05 PROCEDURE — 83605 ASSAY OF LACTIC ACID: CPT

## 2018-03-05 PROCEDURE — 63600175 PHARM REV CODE 636 W HCPCS: Performed by: PHYSICIAN ASSISTANT

## 2018-03-05 PROCEDURE — 83690 ASSAY OF LIPASE: CPT

## 2018-03-05 PROCEDURE — 93005 ELECTROCARDIOGRAM TRACING: CPT

## 2018-03-05 PROCEDURE — 63600175 PHARM REV CODE 636 W HCPCS: Performed by: STUDENT IN AN ORGANIZED HEALTH CARE EDUCATION/TRAINING PROGRAM

## 2018-03-05 PROCEDURE — 81001 URINALYSIS AUTO W/SCOPE: CPT

## 2018-03-05 PROCEDURE — 11000001 HC ACUTE MED/SURG PRIVATE ROOM

## 2018-03-05 PROCEDURE — 93010 ELECTROCARDIOGRAM REPORT: CPT | Mod: ,,, | Performed by: INTERNAL MEDICINE

## 2018-03-05 PROCEDURE — 80053 COMPREHEN METABOLIC PANEL: CPT

## 2018-03-05 PROCEDURE — 99285 EMERGENCY DEPT VISIT HI MDM: CPT | Mod: ,,, | Performed by: PHYSICIAN ASSISTANT

## 2018-03-05 PROCEDURE — 87040 BLOOD CULTURE FOR BACTERIA: CPT

## 2018-03-05 PROCEDURE — 99223 1ST HOSP IP/OBS HIGH 75: CPT | Mod: ,,, | Performed by: HOSPITALIST

## 2018-03-05 PROCEDURE — 96374 THER/PROPH/DIAG INJ IV PUSH: CPT

## 2018-03-05 PROCEDURE — 83880 ASSAY OF NATRIURETIC PEPTIDE: CPT

## 2018-03-05 PROCEDURE — 84145 PROCALCITONIN (PCT): CPT

## 2018-03-05 PROCEDURE — 96361 HYDRATE IV INFUSION ADD-ON: CPT

## 2018-03-05 PROCEDURE — 99285 EMERGENCY DEPT VISIT HI MDM: CPT | Mod: 25

## 2018-03-05 PROCEDURE — 25000003 PHARM REV CODE 250: Performed by: PHYSICIAN ASSISTANT

## 2018-03-05 PROCEDURE — 85025 COMPLETE CBC W/AUTO DIFF WBC: CPT

## 2018-03-05 PROCEDURE — 84484 ASSAY OF TROPONIN QUANT: CPT

## 2018-03-05 PROCEDURE — 87400 INFLUENZA A/B EACH AG IA: CPT | Mod: 59

## 2018-03-05 PROCEDURE — 25000003 PHARM REV CODE 250: Performed by: STUDENT IN AN ORGANIZED HEALTH CARE EDUCATION/TRAINING PROGRAM

## 2018-03-05 PROCEDURE — 96372 THER/PROPH/DIAG INJ SC/IM: CPT

## 2018-03-05 RX ORDER — CEFTRIAXONE 1 G/1
1 INJECTION, POWDER, FOR SOLUTION INTRAMUSCULAR; INTRAVENOUS
Status: COMPLETED | OUTPATIENT
Start: 2018-03-05 | End: 2018-03-05

## 2018-03-05 RX ORDER — ALISKIREN 300 MG/1
300 TABLET, FILM COATED ORAL DAILY
Status: DISCONTINUED | OUTPATIENT
Start: 2018-03-06 | End: 2018-03-05

## 2018-03-05 RX ORDER — CEFTRIAXONE 1 G/1
1 INJECTION, POWDER, FOR SOLUTION INTRAMUSCULAR; INTRAVENOUS
Status: DISCONTINUED | OUTPATIENT
Start: 2018-03-06 | End: 2018-03-05

## 2018-03-05 RX ORDER — MOXIFLOXACIN HYDROCHLORIDE 400 MG/1
400 TABLET ORAL DAILY
Status: DISCONTINUED | OUTPATIENT
Start: 2018-03-06 | End: 2018-03-05

## 2018-03-05 RX ORDER — DOXYCYCLINE HYCLATE 100 MG
100 TABLET ORAL
Status: COMPLETED | OUTPATIENT
Start: 2018-03-05 | End: 2018-03-05

## 2018-03-05 RX ORDER — PRAVASTATIN SODIUM 40 MG/1
40 TABLET ORAL DAILY
Status: DISCONTINUED | OUTPATIENT
Start: 2018-03-06 | End: 2018-03-11 | Stop reason: HOSPADM

## 2018-03-05 RX ORDER — GUAIFENESIN 100 MG/5ML
200 SOLUTION ORAL EVERY 4 HOURS PRN
Status: DISCONTINUED | OUTPATIENT
Start: 2018-03-05 | End: 2018-03-11 | Stop reason: HOSPADM

## 2018-03-05 RX ORDER — ALISKIREN 300 MG/1
300 TABLET, FILM COATED ORAL DAILY
Status: DISCONTINUED | OUTPATIENT
Start: 2018-03-05 | End: 2018-03-11 | Stop reason: HOSPADM

## 2018-03-05 RX ORDER — ACETAMINOPHEN 325 MG/1
650 TABLET ORAL EVERY 4 HOURS PRN
Status: DISCONTINUED | OUTPATIENT
Start: 2018-03-05 | End: 2018-03-11 | Stop reason: HOSPADM

## 2018-03-05 RX ORDER — SODIUM CHLORIDE 0.9 % (FLUSH) 0.9 %
5 SYRINGE (ML) INJECTION
Status: DISCONTINUED | OUTPATIENT
Start: 2018-03-05 | End: 2018-03-11 | Stop reason: HOSPADM

## 2018-03-05 RX ORDER — RAMELTEON 8 MG/1
8 TABLET ORAL NIGHTLY PRN
Status: DISCONTINUED | OUTPATIENT
Start: 2018-03-05 | End: 2018-03-11 | Stop reason: HOSPADM

## 2018-03-05 RX ORDER — MOXIFLOXACIN HYDROCHLORIDE 400 MG/1
400 TABLET ORAL DAILY
Status: COMPLETED | OUTPATIENT
Start: 2018-03-06 | End: 2018-03-09

## 2018-03-05 RX ORDER — BENZONATATE 100 MG/1
200 CAPSULE ORAL 3 TIMES DAILY PRN
Status: DISCONTINUED | OUTPATIENT
Start: 2018-03-05 | End: 2018-03-11 | Stop reason: HOSPADM

## 2018-03-05 RX ORDER — ACETAMINOPHEN 500 MG
1000 TABLET ORAL
Status: COMPLETED | OUTPATIENT
Start: 2018-03-05 | End: 2018-03-05

## 2018-03-05 RX ORDER — AMOXICILLIN 250 MG
2 CAPSULE ORAL DAILY PRN
Status: DISCONTINUED | OUTPATIENT
Start: 2018-03-05 | End: 2018-03-11 | Stop reason: HOSPADM

## 2018-03-05 RX ORDER — PANTOPRAZOLE SODIUM 40 MG/1
40 TABLET, DELAYED RELEASE ORAL DAILY
Status: DISCONTINUED | OUTPATIENT
Start: 2018-03-06 | End: 2018-03-11 | Stop reason: HOSPADM

## 2018-03-05 RX ORDER — ENOXAPARIN SODIUM 100 MG/ML
40 INJECTION SUBCUTANEOUS EVERY 24 HOURS
Status: DISCONTINUED | OUTPATIENT
Start: 2018-03-05 | End: 2018-03-11 | Stop reason: HOSPADM

## 2018-03-05 RX ORDER — ONDANSETRON 2 MG/ML
8 INJECTION INTRAMUSCULAR; INTRAVENOUS EVERY 8 HOURS PRN
Status: DISCONTINUED | OUTPATIENT
Start: 2018-03-05 | End: 2018-03-11 | Stop reason: HOSPADM

## 2018-03-05 RX ADMIN — BENZONATATE 200 MG: 100 CAPSULE ORAL at 11:03

## 2018-03-05 RX ADMIN — ACETAMINOPHEN 1000 MG: 500 TABLET ORAL at 01:03

## 2018-03-05 RX ADMIN — ENOXAPARIN SODIUM 40 MG: 100 INJECTION SUBCUTANEOUS at 06:03

## 2018-03-05 RX ADMIN — CEFTRIAXONE SODIUM 1 G: 1 INJECTION, POWDER, FOR SOLUTION INTRAMUSCULAR; INTRAVENOUS at 02:03

## 2018-03-05 RX ADMIN — LABETALOL HCL 150 MG: 100 TABLET, FILM COATED ORAL at 09:03

## 2018-03-05 RX ADMIN — SODIUM CHLORIDE 500 ML: 0.9 INJECTION, SOLUTION INTRAVENOUS at 02:03

## 2018-03-05 RX ADMIN — GUAIFENESIN 200 MG: 200 SOLUTION ORAL at 11:03

## 2018-03-05 RX ADMIN — ALISKIREN HEMIFUMARATE 300 MG: 300 TABLET, FILM COATED ORAL at 09:03

## 2018-03-05 RX ADMIN — DOXYCYCLINE HYCLATE 100 MG: 100 TABLET, COATED ORAL at 02:03

## 2018-03-05 NOTE — ED PROVIDER NOTES
Encounter Date: 3/5/2018    SCRIBE #1 NOTE: I, Eliane Tovar, am scribing for, and in the presence of,  Dr. Wesley. I have scribed the following portions of the note - the APC attestation.   SCRIBE #2 NOTE: I, Yi Cordobaefren, am scribing for, and in the presence of,  Dr. Tolliver. I have scribed the following portions of the note - the EKG reading.     History     Chief Complaint   Patient presents with    URI     my ribs hurt from coughing, seen at  [facility]est, coughing up greenish     85 year old female with medical history of HTN, HLD, Distant Hx of breast cancer presenting to the ED with the chief complaint of URI symptoms. Patient reports having 3 days of chills, subjective fever, yellow-green productive cough, fatigue, weakness, pleuritic chest pain, decreased appetite. Patient reports upper epigastric pain when coughing and taking a deep breath. She denies nausea, vomiting, diarrhea, constipation. Patient reports her last bowel movement was yesterday and normal. Patient was seen at urgent care yesterday and was diagnosed with viral URI after negative influenza test. She reports her symptoms have persisted and was brought to the ED by a friend for evaluation. Patient reports ambulating without assistance usually but requires a cane at times. She lives by herself currently and able to complete all her ADLs. She denies recent antibiotics and sick contacts. Patient believes she may have had a cholecystectomy years ago but she is not sure.           Review of patient's allergies indicates:   Allergen Reactions    Voltaren [diclofenac sodium] Other (See Comments)     Drops blood pressure    Clarithromycin Other (See Comments)     Weak, extreme fatigue. dizziness    Losartan Rash    Flexeril [cyclobenzaprine] Other (See Comments)     Dizziness    Iodine and iodide containing products     Lisinopril Other (See Comments)     Cough and sensation of throat swelling/?angioedema    Metoprolol Swelling     Tightness in  throat    Sulfa (sulfonamide antibiotics) Rash    Tramadol      Dizzy and weak    Verapamil (bulk) Palpitations     Past Medical History:   Diagnosis Date    Arthritis     Basal cell carcinoma 09/2016    right post auricular neck     Breast cancer     Cataract     Fibromyalgia     Hyperlipidemia     Hypertension     Personal history of colonic polyps     SCC (squamous cell carcinoma) 2015    R chest    SCC (squamous cell carcinoma) 2016    left medial shoulder    SCC (squamous cell carcinoma) 2017    left knee    Squamous cell carcinoma 2015    right forearm    Thyroid disease     Vaginitis      Past Surgical History:   Procedure Laterality Date    ADENOIDECTOMY      CATARACT EXTRACTION W/  INTRAOCULAR LENS IMPLANT Bilateral     MASTECTOMY      partial right    thyriodectomy      partial    tonsillectomy      TOTAL HIP ARTHROPLASTY      right    Yag  Left      Family History   Problem Relation Age of Onset    Breast cancer Mother     Stroke Paternal Grandfather     Heart disease Father     Cancer Paternal Aunt     Heart disease Paternal Aunt     Glaucoma Paternal Grandmother     Amblyopia Neg Hx     Blindness Neg Hx     Cataracts Neg Hx     Diabetes Neg Hx     Hypertension Neg Hx     Macular degeneration Neg Hx     Retinal detachment Neg Hx     Strabismus Neg Hx     Thyroid disease Neg Hx     Melanoma Neg Hx      Social History   Substance Use Topics    Smoking status: Never Smoker    Smokeless tobacco: Never Used    Alcohol use 0.0 oz/week     2 - 3 Glasses of wine per week      Comment: socially     Review of Systems   Constitutional: Positive for chills, fatigue and fever (subjective).   HENT: Negative for congestion, sore throat and trouble swallowing.    Eyes: Negative for pain and redness.   Respiratory: Positive for cough and shortness of breath.    Cardiovascular: Positive for chest pain (pleuritic).   Gastrointestinal: Negative for blood in stool, constipation,  diarrhea, nausea and vomiting.   Genitourinary: Negative for dysuria and hematuria.   Musculoskeletal: Negative for back pain, neck pain and neck stiffness.   Skin: Negative for rash and wound.   Neurological: Positive for weakness. Negative for headaches.       Physical Exam     Initial Vitals [03/05/18 1145]   BP Pulse Resp Temp SpO2   (!) 129/59 76 18 100 °F (37.8 °C) 95 %      MAP       82.33         Physical Exam    Constitutional: She appears well-developed and well-nourished. No distress.   HENT:   Head: Normocephalic and atraumatic.   Mouth/Throat: Oropharynx is clear and moist. No oropharyngeal exudate.   Eyes: EOM are normal. Pupils are equal, round, and reactive to light.   Neck: Normal range of motion. Neck supple.   Cardiovascular: Normal rate and regular rhythm.   Pulmonary/Chest: Breath sounds normal. No respiratory distress. She has no wheezes. She has no rales.   Abdominal: Soft. Bowel sounds are normal. She exhibits no mass. There is tenderness (Epigastric and RUQ). There is no guarding.   Musculoskeletal: Normal range of motion. She exhibits no edema or tenderness.   Neurological: She is alert and oriented to person, place, and time. She has normal strength. No cranial nerve deficit.   Skin: Skin is warm and dry. No erythema.       Imaging Results          US Abdomen Complete (Final result)  Result time 03/05/18 16:32:57    Final result by Sung Ferrell MD (03/05/18 16:32:57)                 Impression:        Status post cholecystectomy.  Otherwise, no significant abnormality identified.  ______________________________________     Electronically signed by resident: NAEEM RUBY  Date:     03/05/18  Time:    16:28            As the supervising and teaching physician, I personally reviewed the images and resident's interpretation and I agree with the findings.          Electronically signed by: SUNG FERRELL MD  Date:     03/05/18  Time:    16:32              Narrative:    Time of  Procedure: 03/05/18 15:50:41  Accession # [id number]    Technique: Complete abdominal ultrasound    Comparison: Ultrasound retroperitoneum 02/03/2017.    Findings:    The visualized portion of the pancreas is unremarkable.      The liver is normal in size measuring 16.3 cm.  The liver demonstrates homogeneous echotexture.  No focal hepatic lesions are seen.  Within normal hepatorenal index measuring 1.1.    The gallbladder is surgically removed.  The common duct is not dilated, measuring 3 mm.  No dilated intrahepatic radicles are seen.      The spleen is normal in size measuring 9.2 cm with a homogeneous echotexture.    The aorta tapers normally.    The kidneys are normal in size without focal abnormality or evidence of hydronephrosis.       There is no evidence of ascites.                             X-Ray Chest PA And Lateral (Final result)  Result time 03/05/18 13:29:50    Final result by Lv Solomon III, MD (03/05/18 13:29:50)                 Impression:     No acute process seen. A      Electronically signed by: LV SOLOMON MD  Date:     03/05/18  Time:    13:29              Narrative:    Findings: Heart size is normal. Lungs are clear. There are breast calcifications. There is DJD.                            ED Course   Procedures  Labs Reviewed   CBC W/ AUTO DIFFERENTIAL - Abnormal; Notable for the following:        Result Value    WBC 16.90 (*)     RBC 3.14 (*)     Hemoglobin 10.6 (*)     Hematocrit 30.6 (*)     MCH 33.8 (*)     Immature Granulocytes 1.1 (*)     Gran # (ANC) 13.6 (*)     Immature Grans (Abs) 0.19 (*)     Mono # 1.9 (*)     Gran% 80.5 (*)     Lymph% 6.7 (*)     All other components within normal limits   COMPREHENSIVE METABOLIC PANEL - Abnormal; Notable for the following:     CO2 21 (*)     Glucose 125 (*)     BUN, Bld 31 (*)     Albumin 3.2 (*)     Total Bilirubin 2.1 (*)     eGFR if  59.4 (*)     eGFR if non  51.5 (*)     All other components within  normal limits   PROCALCITONIN - Abnormal; Notable for the following:     Procalcitonin 0.47 (*)     All other components within normal limits   CULTURE, BLOOD   CULTURE, BLOOD   RESPIRATORY VIRAL PANEL BY PCR   LACTIC ACID, PLASMA   URINALYSIS, REFLEX TO URINE CULTURE    Narrative:     Preferred Collection Type->Urine, Clean Catch   LIPASE   TROPONIN I   B-TYPE NATRIURETIC PEPTIDE   B-TYPE NATRIURETIC PEPTIDE    Narrative:     ADD-ON BNP #[id number] PER NAVA WELLS, PA-C 14:44  03/05/2018   ADD-ON TROP #[id number]; LIPAS #[id number] PER NAVA ZABALA PRISCILLA, PA-C   14:46  03/05/2018    LIPASE    Narrative:     ADD-ON BNP #[id number] PER NAVA ZABALA PRISCILLA, PA-C 14:44  03/05/2018   ADD-ON TROP #[id number]; LIPAS #[id number] PER NAVA ZABALA PRISCILLA, PA-C   14:46  03/05/2018    TROPONIN I    Narrative:     ADD-ON BNP #[id number] PER NAVA ZABALA PRISCILLA, PA-C 14:44  03/05/2018   ADD-ON TROP #[id number]; LIPAS #[id number] PER NAVA WELLS, PA-C   14:46  03/05/2018    URINALYSIS MICROSCOPIC    Narrative:     Preferred Collection Type->Urine, Clean Catch   INFLUENZA A AND B ANTIGEN     EKG Readings: (Independently Interpreted)   Rhythm: Normal Sinus Rhythm. Heart Rate: 77.   Normal axis, normal ST segments          Medical Decision Making:   History:   Old Medical Records: I decided to obtain old medical records.  Independently Interpreted Test(s):   I have ordered and independently interpreted EKG Reading(s) - see prior notes  Clinical Tests:   Lab Tests: Ordered and Reviewed  Radiological Study: Ordered and Reviewed  Medical Tests: Ordered and Reviewed       APC / Resident Notes:   85 year old female with medical history of HTN, HLD, Distant Hx of breast cancer presenting to the ED c/o URI symptoms for 3 days. DDx includes but not limited to viral syndrome, pneumonia, pharyngitis, bacteremia. Will get labs and CXR. Will give fluids.    UA negative for UTI, Lactate 1.0  Procal elevated 0.47  WBC elevated 16.9 with left shift 80.5%, H/H  10.6/30.6  Tbili elevated 2.1, AST/ALT 26/34, Alk phos 78, Crt/BUN 1./31, Lipase 12, BNP 60, Trp 0.013  CXR - No acute processes seen  U/S - Status post cholecystectomy. Otherwise, no significant abnormality identified.    Etiology most consistent with CAP. CXR read as no acute processes, but I suspect a consolidation in the lower right lung zone on my read. Procal elevated with leukocytosis. Ordered Abd u/s to rule out cholecystitis as PT has RUQ tenderness and does not recall if she has a gallbladder with no history/imaging evidence in chart review. Patient started on Rocephin and Doxycycline in the ED. All of the patient's questions were answered. I have discussed the care of this patient with my supervising physician.              Scribe Attestation:   Scribe #1: I performed the above scribed service and the documentation accurately describes the services I performed. I attest to the accuracy of the note.  Scribe #2: I performed the above scribed service and the documentation accurately describes the services I performed. I attest to the accuracy of the note.    Attending Attestation:     Physician Attestation Statement for NP/PA:   I discussed this assessment and plan of this patient with the NP/PA, but I did not personally examine the patient. The face to face encounter was performed by the NP/PA.                     Clinical Impression:   The primary encounter diagnosis was Community acquired pneumonia, unspecified laterality. Diagnoses of Cough, SOB (shortness of breath), and RUQ abdominal tenderness were also pertinent to this visit.    Disposition:   Disposition: Admitted                        Luis Daniel Brian PA-C  03/05/18 5233

## 2018-03-05 NOTE — ED NOTES
"Pt stated "went to urgent care yesterday and was told have a uri but Clarisse been coughing up a lot of junk and when I cough I get sob".       GENERAL: Pt appears well developed and nourished, in NAD, AAOX4    HEENT:   Head normocephalic and atraumatic. Extraocular muscles intact.  Pupil ER to                  light and accomadations. Nares normal. Mouth hydrated and without lesions.                   MMM. Posterior pharynx clear of exudate and lesions.    NECK:    Supple. No carotid bruits. No lymphodenopathy or thyromegaly..    LUNGS:  Clear to auscultation    HEART:  Regular rate and rhythm without murmur.    ABDOMEN:  SRNt. Non distended. Positive BS throughout. No hepatoslenomegaly                      Noted.    EXTREMITIES: Without cyanosis, clubbing, rash, lesions or edema.    NEURO:  Cranial nerves II through XII grossly intact.    PSYCHIATRIC: Denies SI/HI    SKIN:  No ulcerations or induration present.             "

## 2018-03-06 LAB
ANION GAP SERPL CALC-SCNC: 12 MMOL/L
BASOPHILS # BLD AUTO: 0.02 K/UL
BASOPHILS NFR BLD: 0.1 %
BUN SERPL-MCNC: 27 MG/DL
CALCIUM SERPL-MCNC: 9.3 MG/DL
CHLORIDE SERPL-SCNC: 105 MMOL/L
CO2 SERPL-SCNC: 21 MMOL/L
CREAT SERPL-MCNC: 0.9 MG/DL
DIFFERENTIAL METHOD: ABNORMAL
EOSINOPHIL # BLD AUTO: 0 K/UL
EOSINOPHIL NFR BLD: 0 %
ERYTHROCYTE [DISTWIDTH] IN BLOOD BY AUTOMATED COUNT: 12.1 %
EST. GFR  (AFRICAN AMERICAN): >60 ML/MIN/1.73 M^2
EST. GFR  (NON AFRICAN AMERICAN): 58.5 ML/MIN/1.73 M^2
GLUCOSE SERPL-MCNC: 118 MG/DL
HCT VFR BLD AUTO: 28.8 %
HGB BLD-MCNC: 10.1 G/DL
IMM GRANULOCYTES # BLD AUTO: 0.53 K/UL
IMM GRANULOCYTES NFR BLD AUTO: 2.8 %
LYMPHOCYTES # BLD AUTO: 1 K/UL
LYMPHOCYTES NFR BLD: 5.5 %
MAGNESIUM SERPL-MCNC: 1.6 MG/DL
MCH RBC QN AUTO: 34.4 PG
MCHC RBC AUTO-ENTMCNC: 35.1 G/DL
MCV RBC AUTO: 98 FL
MONOCYTES # BLD AUTO: 1.7 K/UL
MONOCYTES NFR BLD: 8.7 %
NEUTROPHILS # BLD AUTO: 15.6 K/UL
NEUTROPHILS NFR BLD: 82.9 %
NRBC BLD-RTO: 0 /100 WBC
PHOSPHATE SERPL-MCNC: 3.5 MG/DL
PLATELET # BLD AUTO: 195 K/UL
PLATELET BLD QL SMEAR: ABNORMAL
PMV BLD AUTO: 9.5 FL
POTASSIUM SERPL-SCNC: 3.7 MMOL/L
PROCALCITONIN SERPL IA-MCNC: 0.71 NG/ML
RBC # BLD AUTO: 2.94 M/UL
SODIUM SERPL-SCNC: 138 MMOL/L
WBC # BLD AUTO: 18.86 K/UL

## 2018-03-06 PROCEDURE — 25000003 PHARM REV CODE 250

## 2018-03-06 PROCEDURE — 84145 PROCALCITONIN (PCT): CPT

## 2018-03-06 PROCEDURE — 36415 COLL VENOUS BLD VENIPUNCTURE: CPT

## 2018-03-06 PROCEDURE — 84100 ASSAY OF PHOSPHORUS: CPT

## 2018-03-06 PROCEDURE — 25000003 PHARM REV CODE 250: Performed by: STUDENT IN AN ORGANIZED HEALTH CARE EDUCATION/TRAINING PROGRAM

## 2018-03-06 PROCEDURE — 85025 COMPLETE CBC W/AUTO DIFF WBC: CPT

## 2018-03-06 PROCEDURE — 11000001 HC ACUTE MED/SURG PRIVATE ROOM

## 2018-03-06 PROCEDURE — 99232 SBSQ HOSP IP/OBS MODERATE 35: CPT | Mod: ,,, | Performed by: HOSPITALIST

## 2018-03-06 PROCEDURE — 83735 ASSAY OF MAGNESIUM: CPT

## 2018-03-06 PROCEDURE — 63600175 PHARM REV CODE 636 W HCPCS: Performed by: STUDENT IN AN ORGANIZED HEALTH CARE EDUCATION/TRAINING PROGRAM

## 2018-03-06 PROCEDURE — 80048 BASIC METABOLIC PNL TOTAL CA: CPT

## 2018-03-06 RX ORDER — CHLORTHALIDONE 25 MG/1
25 TABLET ORAL DAILY
Status: DISCONTINUED | OUTPATIENT
Start: 2018-03-07 | End: 2018-03-07

## 2018-03-06 RX ADMIN — CHLORTHALIDONE 12.5 MG: 25 TABLET ORAL at 12:03

## 2018-03-06 RX ADMIN — GUAIFENESIN 200 MG: 200 SOLUTION ORAL at 10:03

## 2018-03-06 RX ADMIN — ACETAMINOPHEN 650 MG: 325 TABLET, FILM COATED ORAL at 12:03

## 2018-03-06 RX ADMIN — LABETALOL HCL 150 MG: 100 TABLET, FILM COATED ORAL at 11:03

## 2018-03-06 RX ADMIN — PRAVASTATIN SODIUM 40 MG: 40 TABLET ORAL at 10:03

## 2018-03-06 RX ADMIN — BENZONATATE 200 MG: 100 CAPSULE ORAL at 09:03

## 2018-03-06 RX ADMIN — PANTOPRAZOLE SODIUM 40 MG: 40 TABLET, DELAYED RELEASE ORAL at 10:03

## 2018-03-06 RX ADMIN — MOXIFLOXACIN HYDROCHLORIDE 400 MG: 400 TABLET, FILM COATED ORAL at 10:03

## 2018-03-06 RX ADMIN — LABETALOL HCL 150 MG: 100 TABLET, FILM COATED ORAL at 09:03

## 2018-03-06 RX ADMIN — GUAIFENESIN 200 MG: 200 SOLUTION ORAL at 09:03

## 2018-03-06 RX ADMIN — ENOXAPARIN SODIUM 40 MG: 100 INJECTION SUBCUTANEOUS at 04:03

## 2018-03-06 RX ADMIN — RAMELTEON 8 MG: 8 TABLET, FILM COATED ORAL at 09:03

## 2018-03-06 RX ADMIN — BENZONATATE 200 MG: 100 CAPSULE ORAL at 10:03

## 2018-03-06 RX ADMIN — ALISKIREN HEMIFUMARATE 300 MG: 300 TABLET, FILM COATED ORAL at 11:03

## 2018-03-06 NOTE — ASSESSMENT & PLAN NOTE
Restart home regimen  - elevated on admit. Administered anti-HTN medications early  - chlorthalidone 25 mg daily  - aliskiren tablet 300 mg daily  - labetalol split tablet 150 mg BID

## 2018-03-06 NOTE — HPI
Gerda Lai is a 85-year-old female with HTN and distant history of breast cancer (lumpectomy/radiation) presenting with productive cough and chills. Patient reports subjective chills without fever over the past several days. She subsequently developed a productive cough of green sputum on Saturday night that progressively worsened over Sunday. She went to an urgent care appointment on Sunday and was released with a diagnosis of URI with a prescription for tessalon perles and robitussin dm. Her symptoms did not improve today which prompted her visit to the ED. On interview, patient reports some SOB associated with coughing spells but has no respiratory distress at baseline. She has had no sick contacts that she is aware of. She is a nonsmoker with no respiratory history. She denies HA, fever, N/V, abdo pain, diarrhea, or dysuria.    In the ED, she was HDS in no respiratory distress saturating adequately on room air. Her work-up was significant for WBC 16.9, procal 0.47, and with a normal UA. CXR did not note consolidation or infiltrates. TBili 2.1 evaluated with ultrasound which was unremarkable. She was given ceftriaxone 1 g IV x1 and doxycycline 100 mg PO x1 for CAP.

## 2018-03-06 NOTE — SUBJECTIVE & OBJECTIVE
Interval History: No acute events overnight. Feels subjectively better however 100.5 fever overnight with increase in WBC and procal.    Review of Systems   Constitutional: Positive for appetite change. Negative for chills and fever.   HENT: Negative for postnasal drip, rhinorrhea and sore throat.    Respiratory: Positive for cough. Negative for chest tightness, shortness of breath and wheezing.    Cardiovascular: Positive for chest pain (MSK from cough). Negative for palpitations and leg swelling.   Gastrointestinal: Negative for abdominal pain, diarrhea, nausea and vomiting.   Genitourinary: Negative for dysuria and flank pain.   Musculoskeletal: Negative for arthralgias and myalgias.   Skin: Negative for color change and pallor.   Neurological: Negative for light-headedness and headaches.     Objective:     Vital Signs (Most Recent):  Temp: 96.3 °F (35.7 °C) (03/06/18 1142)  Pulse: 73 (03/06/18 1249)  Resp: 20 (03/06/18 1142)  BP: (!) 128/58 (03/06/18 1249)  SpO2: 96 % (03/06/18 1142) Vital Signs (24h Range):  Temp:  [96.3 °F (35.7 °C)-100.5 °F (38.1 °C)] 96.3 °F (35.7 °C)  Pulse:  [72-90] 73  Resp:  [16-20] 20  SpO2:  [94 %-98 %] 96 %  BP: (128-200)/(58-88) 128/58     Weight: 64 kg (141 lb 1.5 oz)  Body mass index is 24.99 kg/m².  No intake or output data in the 24 hours ending 03/06/18 1540   Physical Exam   Constitutional: She is oriented to person, place, and time. She appears well-developed and well-nourished. No distress.   HENT:   Head: Normocephalic and atraumatic.   Mouth/Throat: Mucous membranes are dry.   Neck: Normal range of motion. Neck supple. No JVD present.   Cardiovascular: Normal rate, regular rhythm and intact distal pulses.    No murmur heard.  Pulmonary/Chest: Effort normal. No respiratory distress. She has decreased breath sounds in the right lower field. She exhibits tenderness (attributed to cough).   Abdominal: Soft. Bowel sounds are normal. There is no tenderness.   Musculoskeletal:  Normal range of motion. She exhibits no edema.   Neurological: She is alert and oriented to person, place, and time. No cranial nerve deficit.   Skin: Skin is warm and dry.       Significant Labs:   BMP:   Recent Labs  Lab 03/06/18  0407   *      K 3.7      CO2 21*   BUN 27*   CREATININE 0.9   CALCIUM 9.3   MG 1.6     CBC:   Recent Labs  Lab 03/05/18  1224 03/06/18  0407   WBC 16.90* 18.86*   HGB 10.6* 10.1*   HCT 30.6* 28.8*    195       Significant Imaging: No new imaging

## 2018-03-06 NOTE — ASSESSMENT & PLAN NOTE
Restart home regimen  - elevated on admit. Administered anti-HTN medications early  - chlorthalidone 12.5 mg daily  - aliskiren tablet 300 mg daily  - labetalol split tablet 150 mg BID

## 2018-03-06 NOTE — PROGRESS NOTES
[facility] Hospital Medicine  Progress Note    Patient Name: Gerda Lai  MRN: [id number]  Patient Class: IP- Inpatient   Admission Date: 3/5/2018  Length of Stay: 1 days  Attending Physician: Josette Ignacio MD  Primary Care Provider: Demario Sellers MD    [facility] Medicine Team: [facility]OSP MED 4 Kj Montano MD    Subjective:     Principal Problem:Community acquired pneumonia    HPI:  Gerda Lai is a 85-year-old female with HTN and distant history of breast cancer (lumpectomy/radiation) presenting with productive cough and chills. Patient reports subjective chills without fever over the past several days. She subsequently developed a productive cough of green sputum on Saturday night that progressively worsened over Sunday. She went to an urgent care appointment on Sunday and was released with a diagnosis of URI with a prescription for tessalon perles and robitussin dm. Her symptoms did not improve today which prompted her visit to the ED. On interview, patient reports some SOB associated with coughing spells but has no respiratory distress at baseline. She has had no sick contacts that she is aware of. She is a nonsmoker with no respiratory history. She denies HA, fever, N/V, abdo pain, diarrhea, or dysuria.    In the ED, she was HDS in no respiratory distress saturating adequately on room air. Her work-up was significant for WBC 16.9, procal 0.47, and with a normal UA. CXR did not note consolidation or infiltrates. TBili 2.1 evaluated with ultrasound which was unremarkable. She was given ceftriaxone 1 g IV x1 and doxycycline 100 mg PO x1 for CAP.    Hospital Course:  3/6: Subjective improvement today. WBC and procal slightly increased and spiked low-grade fever 100.5 overnight. Continue moxifloxacin    Interval History: No acute events overnight. Feels subjectively better however 100.5 fever overnight with increase in WBC and procal.    Review of Systems   Constitutional: Positive  for appetite change. Negative for chills and fever.   HENT: Negative for postnasal drip, rhinorrhea and sore throat.    Respiratory: Positive for cough. Negative for chest tightness, shortness of breath and wheezing.    Cardiovascular: Positive for chest pain (MSK from cough). Negative for palpitations and leg swelling.   Gastrointestinal: Negative for abdominal pain, diarrhea, nausea and vomiting.   Genitourinary: Negative for dysuria and flank pain.   Musculoskeletal: Negative for arthralgias and myalgias.   Skin: Negative for color change and pallor.   Neurological: Negative for light-headedness and headaches.     Objective:     Vital Signs (Most Recent):  Temp: 96.3 °F (35.7 °C) (03/06/18 1142)  Pulse: 73 (03/06/18 1249)  Resp: 20 (03/06/18 1142)  BP: (!) 128/58 (03/06/18 1249)  SpO2: 96 % (03/06/18 1142) Vital Signs (24h Range):  Temp:  [96.3 °F (35.7 °C)-100.5 °F (38.1 °C)] 96.3 °F (35.7 °C)  Pulse:  [72-90] 73  Resp:  [16-20] 20  SpO2:  [94 %-98 %] 96 %  BP: (128-200)/(58-88) 128/58     Weight: 64 kg (141 lb 1.5 oz)  Body mass index is 24.99 kg/m².  No intake or output data in the 24 hours ending 03/06/18 1540   Physical Exam   Constitutional: She is oriented to person, place, and time. She appears well-developed and well-nourished. No distress.   HENT:   Head: Normocephalic and atraumatic.   Mouth/Throat: Mucous membranes are dry.   Neck: Normal range of motion. Neck supple. No JVD present.   Cardiovascular: Normal rate, regular rhythm and intact distal pulses.    No murmur heard.  Pulmonary/Chest: Effort normal. No respiratory distress. She has decreased breath sounds in the right lower field. She exhibits tenderness (attributed to cough).   Abdominal: Soft. Bowel sounds are normal. There is no tenderness.   Musculoskeletal: Normal range of motion. She exhibits no edema.   Neurological: She is alert and oriented to person, place, and time. No cranial nerve deficit.   Skin: Skin is warm and dry.        Significant Labs:   BMP:   Recent Labs  Lab 03/06/18  0407   *      K 3.7      CO2 21*   BUN 27*   CREATININE 0.9   CALCIUM 9.3   MG 1.6     CBC:   Recent Labs  Lab 03/05/18  1224 03/06/18  0407   WBC 16.90* 18.86*   HGB 10.6* 10.1*   HCT 30.6* 28.8*    195       Significant Imaging: No new imaging    Assessment/Plan:      * Community acquired pneumonia    Productive cough with elevated WBC and procal on admit. CXR equivocal. Flu negative. Treating as CAP.  - No respiratory distress. Patient saturating adequately on RA  - WBC and procal increased from admit  - No other sources of infection suspected. UA clean and recent arthrocentesis of L knee is nontender or warm on exam.  - Allergy to clarithromycin documented, however patient is unaware of this allergy  - Moxifloxacin 400 mg daily for 7 days. Will reevaluate tomorrow for response to therapy and alter abx if necessary  - tessalon perles and guaifenesin PRN for cough            Hypertension, essential    Restart home regimen  - elevated on admit. Administered anti-HTN medications early  - chlorthalidone 25 mg daily  - aliskiren tablet 300 mg daily  - labetalol split tablet 150 mg BID          VTE Risk Mitigation         Ordered     enoxaparin injection 40 mg  Daily     Route:  Subcutaneous        03/05/18 1818     Medium Risk of VTE  Once      03/05/18 1818              Kj Montano MD  Department of Hospital Medicine   [facility]

## 2018-03-06 NOTE — PHARMACY MED REC
"Admission Medication Reconciliation - Pharmacy Consult Note    The home medication history was taken by Collette White Pharmacy Tech.  Based on information gathered and subsequent review by the clinical pharmacist, the items below may need attention.     You may go to "Admission" then "Reconcile Home Medications" tabs to review and/or act upon these items. Based on information gathered and subsequent review by the clinical pharmacist, the items below may need attention.    Potentially problematic discrepancies with current MAR  o Patient is taking a drug DIFFERENTLY than how ordered upon admit  o Chlorthalidone 25 mg daily (12.5 mg ordered)      Please address this information as you see fit.  Feel free to contact us if you have any questions or require assistance.    Jacquelin KiserD, [facility]  Internal Medicine Clinical Pharmacy Specialist  Spectra link: [phone]                    .    .          "

## 2018-03-06 NOTE — ED NOTES
/77, patient asymptomatic. IM 4 resident at bedside, discussing plan of care with patient. He was made aware of elevated BP and states that he will review patient's meds and will order BP med.

## 2018-03-06 NOTE — PLAN OF CARE
"CM to bedside - pt provided assessment info. Pt w/ DME in place, lives alone. Pt will likely d/c home w/ no needs but may benefit from h/h - pt was weak/fatigued during assessment w/ CM. CM noted that therapy was not ordered and requested PT/OT eval for pt.    CM provided patient anticipated LEONARDO which will be update by nursing staff. Patient provided a Blue "My Health Packet" for d/c planning and health tool. Patient verbalized understanding.     03/06/18 1500   Discharge Assessment   Assessment Type Discharge Planning Assessment   Confirmed/corrected address and phone number on facesheet? Yes   Assessment information obtained from? Patient   Expected Length of Stay (days) 3   Communicated expected length of stay with patient/caregiver yes   Prior to hospitilization cognitive status: Alert/Oriented   Prior to hospitalization functional status: Assistive Equipment   Current cognitive status: Alert/Oriented   Current Functional Status: Assistive Equipment   Facility Arrived From: N/A   Lives With alone   Able to Return to Prior Arrangements yes   Is patient able to care for self after discharge? Yes   Who are your caregiver(s) and their phone number(s)? rakel Subramanian [phone]   Patient's perception of discharge disposition home or selfcare   Readmission Within The Last 30 Days no previous admission in last 30 days   Patient currently being followed by outpatient case management? No   Patient currently receives any other outside agency services? No   Equipment Currently Used at Home cane, straight;walker, rolling;rollator;wheelchair;shower chair   Do you have any problems affording any of your prescribed medications? No   Is the patient taking medications as prescribed? yes   Does the patient have transportation home? Yes   Transportation Available car   Dialysis Name and Scheduled days N/A   Does the patient receive services at the [facility]? No   Discharge Plan A Home   Discharge Plan B Home;Home Health "   Patient/Family In Agreement With Plan yes

## 2018-03-06 NOTE — H&P
[facility] Hospital Medicine  History & Physical    Patient Name: Gerda Lai  MRN: [id number]  Admission Date: 3/5/2018  Attending Physician: Gene Wesley MD   Primary Care Provider: Demario Sellers MD    [facility] Medicine Team: [facility]OSP MED 4 Kj Montano MD     Patient information was obtained from patient, relative(s), past medical records and ER records.     Subjective:     Principal Problem:Community acquired pneumonia    Chief Complaint:   Chief Complaint   Patient presents with    URI     my ribs hurt from coughing, seen at  [facility], coughing up greenish        HPI: Gerda Lai is a 85-year-old female with HTN and distant history of breast cancer (lumpectomy/radiation) presenting with productive cough and chills. Patient reports subjective chills without fever over the past several days. She subsequently developed a productive cough of green sputum on Saturday night that progressively worsened over Sunday. She went to an urgent care appointment on Sunday and was released with a diagnosis of URI with a prescription for tessalon perles and robitussin dm. Her symptoms did not improve today which prompted her visit to the ED. On interview, patient reports some SOB associated with coughing spells but has no respiratory distress at baseline. She has had no sick contacts that she is aware of. She is a nonsmoker with no respiratory history. She denies HA, fever, N/V, abdo pain, diarrhea, or dysuria.    In the ED, she was HDS in no respiratory distress saturating adequately on room air. Her work-up was significant for WBC 16.9, procal 0.47, and with a normal UA. CXR did not note consolidation or infiltrates. TBili 2.1 evaluated with ultrasound which was unremarkable. She was given ceftriaxone 1 g IV x1 and doxycycline 100 mg PO x1 for CAP.    Past Medical History:   Diagnosis Date    Arthritis     Basal cell carcinoma 09/2016    right post auricular neck     Breast cancer      Cataract     Fibromyalgia     Hyperlipidemia     Hypertension     Personal history of colonic polyps     SCC (squamous cell carcinoma) 2015    R chest    SCC (squamous cell carcinoma) 2016    left medial shoulder    SCC (squamous cell carcinoma) 2017    left knee    Squamous cell carcinoma 2015    right forearm    Thyroid disease     Vaginitis        Past Surgical History:   Procedure Laterality Date    ADENOIDECTOMY      CATARACT EXTRACTION W/  INTRAOCULAR LENS IMPLANT Bilateral     MASTECTOMY      partial right    thyriodectomy      partial    tonsillectomy      TOTAL HIP ARTHROPLASTY      right    Yag  Left        Review of patient's allergies indicates:   Allergen Reactions    Voltaren [diclofenac sodium] Other (See Comments)     Drops blood pressure    Clarithromycin Other (See Comments)     Weak, extreme fatigue. dizziness    Losartan Rash    Flexeril [cyclobenzaprine] Other (See Comments)     Dizziness    Iodine and iodide containing products     Lisinopril Other (See Comments)     Cough and sensation of throat swelling/?angioedema    Metoprolol Swelling     Tightness in throat    Sulfa (sulfonamide antibiotics) Rash    Tramadol      Dizzy and weak    Verapamil (bulk) Palpitations       No current facility-administered medications on file prior to encounter.      Current Outpatient Prescriptions on File Prior to Encounter   Medication Sig    acetaminophen (TYLENOL) 650 MG TbSR Take 650 mg by mouth as needed.    ascorbic acid (VITAMIN C) 100 MG tablet Take 100 mg by mouth once daily.    B INFANTIS/B ANI/B JOSE/B BIFID (PROBIOTIC 4X ORAL) Take 1 tablet by mouth daily    benzonatate (TESSALON) 200 MG capsule Take 1 capsule (200 mg total) by mouth 3 (three) times daily as needed for Cough.    chlorthalidone (HYGROTEN) 25 MG Tab Take 12.5 mg by mouth once daily.     coenzyme Q10 (CO Q-10) 100 mg capsule Take 100 mg by mouth once daily.    glucosamine-chondroitin 500-400 mg tablet  Take 1 tablet by mouth 2 (two) times daily.     labetalol (NORMODYNE) 100 MG tablet TAKE 1 AND 1/2 TABLETS BY MOUTH TWICE A DAY    LORazepam (ATIVAN) 0.5 MG tablet TAKE 1/2 TABLET BY MOUTH TWICE A DAY    multivitamin capsule Take 1 capsule by mouth once daily.    omeprazole (PRILOSEC OTC) 20 MG tablet Take 20 mg by mouth once daily.      pravastatin (PRAVACHOL) 40 MG tablet TAKE 1 TABLET (40 MG TOTAL) BY MOUTH ONCE DAILY.    promethazine-dextromethorphan (PROMETHAZINE-DM) 6.25-15 mg/5 mL Syrp Take 5 mLs by mouth nightly as needed (COUGH).    TEKTURNA 300 mg Tab 300 mg once daily.     triamcinolone acetonide 0.1% (KENALOG) 0.1 % cream AAA bid for itching; not more than 2 weeks straight in same location    fluticasone (FLONASE) 50 mcg/actuation nasal spray USE 1 SPRAY IN EACH NOSTRIL ONCE DAILY (Patient taking differently: as needed. )    meloxicam (MOBIC) 7.5 MG tablet TAKE 1 TABLET (7.5 MG TOTAL) BY MOUTH ONCE DAILY.    UNABLE TO FIND once daily. OTC EYE DROP; Systane     Family History     Problem Relation (Age of Onset)    Breast cancer Mother    Cancer Paternal Aunt    Glaucoma Paternal Grandmother    Heart disease Father, Paternal Aunt    Stroke Paternal Grandfather        Social History Main Topics    Smoking status: Never Smoker    Smokeless tobacco: Never Used    Alcohol use 0.0 oz/week     2 - 3 Glasses of wine per week      Comment: socially    Drug use: No    Sexual activity: No     Review of Systems   Constitutional: Positive for appetite change. Negative for chills and fever.   HENT: Negative for postnasal drip, rhinorrhea and sore throat.    Respiratory: Positive for cough. Negative for chest tightness, shortness of breath and wheezing.    Cardiovascular: Positive for chest pain (MSK from cough). Negative for palpitations and leg swelling.   Gastrointestinal: Negative for abdominal pain, diarrhea, nausea and vomiting.   Genitourinary: Negative for dysuria and flank pain.    Musculoskeletal: Negative for arthralgias and myalgias.   Skin: Negative for color change and pallor.   Neurological: Negative for light-headedness and headaches.     Objective:     Vital Signs (Most Recent):  Temp: 98.7 °F (37.1 °C) (03/05/18 1803)  Pulse: 76 (03/05/18 1803)  Resp: 18 (03/05/18 1803)  BP: (!) 182/77 (03/05/18 1803)  SpO2: 96 % (03/05/18 1803) Vital Signs (24h Range):  Temp:  [98.7 °F (37.1 °C)-100 °F (37.8 °C)] 98.7 °F (37.1 °C)  Pulse:  [76] 76  Resp:  [18] 18  SpO2:  [95 %-96 %] 96 %  BP: (129-182)/(59-77) 182/77     Weight: 59.4 kg (131 lb)  Body mass index is 23.21 kg/m².    Physical Exam   Constitutional: She is oriented to person, place, and time. She appears well-developed and well-nourished. No distress.   HENT:   Head: Normocephalic and atraumatic.   Mouth/Throat: Mucous membranes are dry.   Neck: Normal range of motion. Neck supple. No JVD present.   Cardiovascular: Normal rate, regular rhythm and intact distal pulses.    No murmur heard.  Pulmonary/Chest: Effort normal. No respiratory distress. She has decreased breath sounds in the right lower field. She exhibits tenderness (attributed to cough).   Abdominal: Soft. Bowel sounds are normal. There is no tenderness.   Musculoskeletal: Normal range of motion. She exhibits no edema.   Neurological: She is alert and oriented to person, place, and time. No cranial nerve deficit.   Skin: Skin is warm and dry.           Significant Labs:   CBC:   Recent Labs  Lab 03/05/18  1224   WBC 16.90*   HGB 10.6*   HCT 30.6*        CMP:   Recent Labs  Lab 03/05/18  1224      K 3.9      CO2 21*   *   BUN 31*   CREATININE 1.0   CALCIUM 9.5   PROT 6.7   ALBUMIN 3.2*   BILITOT 2.1*   ALKPHOS 78   AST 26   ALT 34   ANIONGAP 12   EGFRNONAA 51.5*     Cardiac Markers:   Recent Labs  Lab 03/05/18  1224   BNP 60     Lactic Acid:   Recent Labs  Lab 03/05/18  1420   LACTATE 1.0     Troponin:   Recent Labs  Lab 03/05/18  1224   TROPONINI  0.013     Urine Studies:   Recent Labs  Lab 03/05/18  1444   COLORU Yellow   APPEARANCEUA Clear   PHUR 5.0   SPECGRAV 1.025   PROTEINUA Negative   GLUCUA Negative   KETONESU Negative   BILIRUBINUA Negative   OCCULTUA Negative   NITRITE Negative   UROBILINOGEN Negative   LEUKOCYTESUR Negative   RBCUA 1   WBCUA 1   BACTERIA Rare   SQUAMEPITHEL 0       Significant Imaging:   CXR  - read as no acute process seen with clear lungs  - some developing right lower-lobe consolidation on my inrterpretation    Assessment/Plan:     * Community acquired pneumonia    Productive cough with elevated WBC and procal on admit. CXR equivocal. Flu negative. Treating as CAP.  - No respiratory distress. Patient saturating adequately on RA  - No other sources of infection suspected. UA clean and recent arthrocentesis of L knee is nontender or warm on exam.  - Allergy to clarithromycin  - Moxifloxacin 400 mg daily for 7 days  - Repeat procal tomorrow  - tessalon perles and guaifenesin PRN for cough            Hypertension, essential    Restart home regimen  - elevated on admit. Administered anti-HTN medications early  - chlorthalidone 12.5 mg daily  - aliskiren tablet 300 mg daily  - labetalol split tablet 150 mg BID          VTE Risk Mitigation         Ordered     enoxaparin injection 40 mg  Daily     Route:  Subcutaneous        03/05/18 1818     Medium Risk of VTE  Once      03/05/18 1818             Kj Montano MD  Department of Hospital Medicine   [facility]

## 2018-03-06 NOTE — PROGRESS NOTES
Cristiano WHARTON to notify pt's BP elevated this AM.  BP meds not available from pharmacy until 1130AM.  Will recheck BP at 1230pm and document.

## 2018-03-06 NOTE — HOSPITAL COURSE
3/6: Subjective improvement today. WBC and procal slightly increased and spiked low-grade fever 100.5 overnight. Continue moxifloxacin  3/7: WBC flat. Procal improved. Patient feels subjectively better. Will continue moxifloxacin.  3/9: Finished 5 day course of moxi. No change in WBC. Procal improved. Patient spiked 100.5 fever this AM and feels fatigued. Repeat CXR showing patchy infiltrate of right lower lung with small pleural effusion, BNP normal. Switched abx to ceftriaxone and azithromycin.  3/10: Subjective improvement. New hyponatremia in the setting of increased chlorthalidone dosing. Increased BB and decreased thiazide.  3/11: WBC with substantial improvement today. Likely atypical pneumonia that has responded well to azithromycin. Hyponatremia resolved. Patient discharged with 2 days left of 5 day course of azithromycin for CAP. Patient evaluated by PT/OT while inpatient and recommended outpatient rehab if desired. During her stay, she was able to ambulate around the room without difficulty and not significantly deconditioned. Family is able to support her while she recovers from this episode.

## 2018-03-06 NOTE — ASSESSMENT & PLAN NOTE
Productive cough with elevated WBC and procal on admit. CXR equivocal. Treating as CAP.  - No respiratory distress. Patient saturating adequately on RA  - No other sources of infection suspected. UA clean and recent arthrocentesis of L knee is nontender or warm on exam.  - Allergy to clarithromycin  - Moxifloxacin 400 mg daily for 7 days  - Repeat procal tomorrow  - tessalon perles and guaifenesin PRN for cough

## 2018-03-06 NOTE — ASSESSMENT & PLAN NOTE
Productive cough with elevated WBC and procal on admit. CXR equivocal. Flu negative. Treating as CAP.  - No respiratory distress. Patient saturating adequately on RA  - WBC and procal increased from admit  - No other sources of infection suspected. UA clean and recent arthrocentesis of L knee is nontender or warm on exam.  - Allergy to clarithromycin documented, however patient is unaware of this allergy  - Moxifloxacin 400 mg daily for 7 days. Will reevaluate tomorrow for response to therapy and alter abx if necessary  - tessalon perles and guaifenesin PRN for cough

## 2018-03-06 NOTE — SUBJECTIVE & OBJECTIVE
Past Medical History:   Diagnosis Date    Arthritis     Basal cell carcinoma 09/2016    right post auricular neck     Breast cancer     Cataract     Fibromyalgia     Hyperlipidemia     Hypertension     Personal history of colonic polyps     SCC (squamous cell carcinoma) 2015    R chest    SCC (squamous cell carcinoma) 2016    left medial shoulder    SCC (squamous cell carcinoma) 2017    left knee    Squamous cell carcinoma 2015    right forearm    Thyroid disease     Vaginitis        Past Surgical History:   Procedure Laterality Date    ADENOIDECTOMY      CATARACT EXTRACTION W/  INTRAOCULAR LENS IMPLANT Bilateral     MASTECTOMY      partial right    thyriodectomy      partial    tonsillectomy      TOTAL HIP ARTHROPLASTY      right    Yag  Left        Review of patient's allergies indicates:   Allergen Reactions    Voltaren [diclofenac sodium] Other (See Comments)     Drops blood pressure    Clarithromycin Other (See Comments)     Weak, extreme fatigue. dizziness    Losartan Rash    Flexeril [cyclobenzaprine] Other (See Comments)     Dizziness    Iodine and iodide containing products     Lisinopril Other (See Comments)     Cough and sensation of throat swelling/?angioedema    Metoprolol Swelling     Tightness in throat    Sulfa (sulfonamide antibiotics) Rash    Tramadol      Dizzy and weak    Verapamil (bulk) Palpitations       No current facility-administered medications on file prior to encounter.      Current Outpatient Prescriptions on File Prior to Encounter   Medication Sig    acetaminophen (TYLENOL) 650 MG TbSR Take 650 mg by mouth as needed.    ascorbic acid (VITAMIN C) 100 MG tablet Take 100 mg by mouth once daily.    B INFANTIS/B ANI/B JOSE/B BIFID (PROBIOTIC 4X ORAL) Take 1 tablet by mouth daily    benzonatate (TESSALON) 200 MG capsule Take 1 capsule (200 mg total) by mouth 3 (three) times daily as needed for Cough.    chlorthalidone (HYGROTEN) 25 MG Tab Take 12.5 mg by  mouth once daily.     coenzyme Q10 (CO Q-10) 100 mg capsule Take 100 mg by mouth once daily.    glucosamine-chondroitin 500-400 mg tablet Take 1 tablet by mouth 2 (two) times daily.     labetalol (NORMODYNE) 100 MG tablet TAKE 1 AND 1/2 TABLETS BY MOUTH TWICE A DAY    LORazepam (ATIVAN) 0.5 MG tablet TAKE 1/2 TABLET BY MOUTH TWICE A DAY    multivitamin capsule Take 1 capsule by mouth once daily.    omeprazole (PRILOSEC OTC) 20 MG tablet Take 20 mg by mouth once daily.      pravastatin (PRAVACHOL) 40 MG tablet TAKE 1 TABLET (40 MG TOTAL) BY MOUTH ONCE DAILY.    promethazine-dextromethorphan (PROMETHAZINE-DM) 6.25-15 mg/5 mL Syrp Take 5 mLs by mouth nightly as needed (COUGH).    TEKTURNA 300 mg Tab 300 mg once daily.     triamcinolone acetonide 0.1% (KENALOG) 0.1 % cream AAA bid for itching; not more than 2 weeks straight in same location    fluticasone (FLONASE) 50 mcg/actuation nasal spray USE 1 SPRAY IN EACH NOSTRIL ONCE DAILY (Patient taking differently: as needed. )    meloxicam (MOBIC) 7.5 MG tablet TAKE 1 TABLET (7.5 MG TOTAL) BY MOUTH ONCE DAILY.    UNABLE TO FIND once daily. OTC EYE DROP; Systane     Family History     Problem Relation (Age of Onset)    Breast cancer Mother    Cancer Paternal Aunt    Glaucoma Paternal Grandmother    Heart disease Father, Paternal Aunt    Stroke Paternal Grandfather        Social History Main Topics    Smoking status: Never Smoker    Smokeless tobacco: Never Used    Alcohol use 0.0 oz/week     2 - 3 Glasses of wine per week      Comment: socially    Drug use: No    Sexual activity: No     Review of Systems   Constitutional: Positive for appetite change. Negative for chills and fever.   HENT: Negative for postnasal drip, rhinorrhea and sore throat.    Respiratory: Positive for cough. Negative for chest tightness, shortness of breath and wheezing.    Cardiovascular: Positive for chest pain (MSK from cough). Negative for palpitations and leg swelling.    Gastrointestinal: Negative for abdominal pain, diarrhea, nausea and vomiting.   Genitourinary: Negative for dysuria and flank pain.   Musculoskeletal: Negative for arthralgias and myalgias.   Skin: Negative for color change and pallor.   Neurological: Negative for light-headedness and headaches.     Objective:     Vital Signs (Most Recent):  Temp: 98.7 °F (37.1 °C) (03/05/18 1803)  Pulse: 76 (03/05/18 1803)  Resp: 18 (03/05/18 1803)  BP: (!) 182/77 (03/05/18 1803)  SpO2: 96 % (03/05/18 1803) Vital Signs (24h Range):  Temp:  [98.7 °F (37.1 °C)-100 °F (37.8 °C)] 98.7 °F (37.1 °C)  Pulse:  [76] 76  Resp:  [18] 18  SpO2:  [95 %-96 %] 96 %  BP: (129-182)/(59-77) 182/77     Weight: 59.4 kg (131 lb)  Body mass index is 23.21 kg/m².    Physical Exam   Constitutional: She is oriented to person, place, and time. She appears well-developed and well-nourished. No distress.   HENT:   Head: Normocephalic and atraumatic.   Mouth/Throat: Mucous membranes are dry.   Neck: Normal range of motion. Neck supple. No JVD present.   Cardiovascular: Normal rate, regular rhythm and intact distal pulses.    No murmur heard.  Pulmonary/Chest: Effort normal. No respiratory distress. She has decreased breath sounds in the right lower field. She exhibits tenderness (attributed to cough).   Abdominal: Soft. Bowel sounds are normal. There is no tenderness.   Musculoskeletal: Normal range of motion. She exhibits no edema.   Neurological: She is alert and oriented to person, place, and time. No cranial nerve deficit.   Skin: Skin is warm and dry.           Significant Labs:   CBC:   Recent Labs  Lab 03/05/18  1224   WBC 16.90*   HGB 10.6*   HCT 30.6*        CMP:   Recent Labs  Lab 03/05/18  1224      K 3.9      CO2 21*   *   BUN 31*   CREATININE 1.0   CALCIUM 9.5   PROT 6.7   ALBUMIN 3.2*   BILITOT 2.1*   ALKPHOS 78   AST 26   ALT 34   ANIONGAP 12   EGFRNONAA 51.5*     Cardiac Markers:   Recent Labs  Lab 03/05/18  1224    BNP 60     Lactic Acid:   Recent Labs  Lab 03/05/18  1420   LACTATE 1.0     Troponin:   Recent Labs  Lab 03/05/18  1224   TROPONINI 0.013     Urine Studies:   Recent Labs  Lab 03/05/18  1444   COLORU Yellow   APPEARANCEUA Clear   PHUR 5.0   SPECGRAV 1.025   PROTEINUA Negative   GLUCUA Negative   KETONESU Negative   BILIRUBINUA Negative   OCCULTUA Negative   NITRITE Negative   UROBILINOGEN Negative   LEUKOCYTESUR Negative   RBCUA 1   WBCUA 1   BACTERIA Rare   SQUAMEPITHEL 0       Significant Imaging:   CXR  - read as no acute process seen with clear lungs  - some developing right lower-lobe consolidation on my inrterpretation

## 2018-03-07 LAB
ANION GAP SERPL CALC-SCNC: 10 MMOL/L
BASOPHILS # BLD AUTO: 0.02 K/UL
BASOPHILS NFR BLD: 0.1 %
BUN SERPL-MCNC: 32 MG/DL
CALCIUM SERPL-MCNC: 9 MG/DL
CHLORIDE SERPL-SCNC: 106 MMOL/L
CO2 SERPL-SCNC: 22 MMOL/L
CREAT SERPL-MCNC: 1 MG/DL
DIFFERENTIAL METHOD: ABNORMAL
EOSINOPHIL # BLD AUTO: 0 K/UL
EOSINOPHIL NFR BLD: 0.2 %
ERYTHROCYTE [DISTWIDTH] IN BLOOD BY AUTOMATED COUNT: 12 %
EST. GFR  (AFRICAN AMERICAN): 59.4 ML/MIN/1.73 M^2
EST. GFR  (NON AFRICAN AMERICAN): 51.5 ML/MIN/1.73 M^2
GLUCOSE SERPL-MCNC: 116 MG/DL
HCT VFR BLD AUTO: 28.2 %
HGB BLD-MCNC: 9.7 G/DL
IMM GRANULOCYTES # BLD AUTO: 0.4 K/UL
IMM GRANULOCYTES NFR BLD AUTO: 2.1 %
LYMPHOCYTES # BLD AUTO: 1.2 K/UL
LYMPHOCYTES NFR BLD: 6.4 %
MAGNESIUM SERPL-MCNC: 1.4 MG/DL
MCH RBC QN AUTO: 33.9 PG
MCHC RBC AUTO-ENTMCNC: 34.4 G/DL
MCV RBC AUTO: 99 FL
MONOCYTES # BLD AUTO: 1.8 K/UL
MONOCYTES NFR BLD: 9.4 %
NEUTROPHILS # BLD AUTO: 15.5 K/UL
NEUTROPHILS NFR BLD: 81.8 %
NRBC BLD-RTO: 0 /100 WBC
PHOSPHATE SERPL-MCNC: 3.4 MG/DL
PLATELET # BLD AUTO: 212 K/UL
PMV BLD AUTO: 9.3 FL
POTASSIUM SERPL-SCNC: 3.5 MMOL/L
PROCALCITONIN SERPL IA-MCNC: 0.48 NG/ML
RBC # BLD AUTO: 2.86 M/UL
SODIUM SERPL-SCNC: 138 MMOL/L
WBC # BLD AUTO: 18.98 K/UL

## 2018-03-07 PROCEDURE — 84145 PROCALCITONIN (PCT): CPT

## 2018-03-07 PROCEDURE — 97165 OT EVAL LOW COMPLEX 30 MIN: CPT

## 2018-03-07 PROCEDURE — 63600175 PHARM REV CODE 636 W HCPCS: Performed by: STUDENT IN AN ORGANIZED HEALTH CARE EDUCATION/TRAINING PROGRAM

## 2018-03-07 PROCEDURE — 25000003 PHARM REV CODE 250

## 2018-03-07 PROCEDURE — 25000003 PHARM REV CODE 250: Performed by: STUDENT IN AN ORGANIZED HEALTH CARE EDUCATION/TRAINING PROGRAM

## 2018-03-07 PROCEDURE — 94664 DEMO&/EVAL PT USE INHALER: CPT

## 2018-03-07 PROCEDURE — 25000242 PHARM REV CODE 250 ALT 637 W/ HCPCS: Performed by: STUDENT IN AN ORGANIZED HEALTH CARE EDUCATION/TRAINING PROGRAM

## 2018-03-07 PROCEDURE — 36415 COLL VENOUS BLD VENIPUNCTURE: CPT

## 2018-03-07 PROCEDURE — 83735 ASSAY OF MAGNESIUM: CPT

## 2018-03-07 PROCEDURE — 85025 COMPLETE CBC W/AUTO DIFF WBC: CPT

## 2018-03-07 PROCEDURE — 94761 N-INVAS EAR/PLS OXIMETRY MLT: CPT

## 2018-03-07 PROCEDURE — 11000001 HC ACUTE MED/SURG PRIVATE ROOM

## 2018-03-07 PROCEDURE — 99232 SBSQ HOSP IP/OBS MODERATE 35: CPT | Mod: ,,, | Performed by: HOSPITALIST

## 2018-03-07 PROCEDURE — 80048 BASIC METABOLIC PNL TOTAL CA: CPT

## 2018-03-07 PROCEDURE — 84100 ASSAY OF PHOSPHORUS: CPT

## 2018-03-07 PROCEDURE — 97161 PT EVAL LOW COMPLEX 20 MIN: CPT

## 2018-03-07 PROCEDURE — 94640 AIRWAY INHALATION TREATMENT: CPT

## 2018-03-07 RX ORDER — IPRATROPIUM BROMIDE AND ALBUTEROL SULFATE 2.5; .5 MG/3ML; MG/3ML
3 SOLUTION RESPIRATORY (INHALATION)
Status: DISCONTINUED | OUTPATIENT
Start: 2018-03-07 | End: 2018-03-09

## 2018-03-07 RX ORDER — CHLORTHALIDONE 25 MG/1
25 TABLET ORAL ONCE
Status: COMPLETED | OUTPATIENT
Start: 2018-03-07 | End: 2018-03-07

## 2018-03-07 RX ORDER — CHLORTHALIDONE 25 MG/1
50 TABLET ORAL DAILY
Status: DISCONTINUED | OUTPATIENT
Start: 2018-03-08 | End: 2018-03-10

## 2018-03-07 RX ORDER — LANOLIN ALCOHOL/MO/W.PET/CERES
400 CREAM (GRAM) TOPICAL ONCE
Status: COMPLETED | OUTPATIENT
Start: 2018-03-07 | End: 2018-03-07

## 2018-03-07 RX ADMIN — ENOXAPARIN SODIUM 40 MG: 100 INJECTION SUBCUTANEOUS at 04:03

## 2018-03-07 RX ADMIN — IPRATROPIUM BROMIDE AND ALBUTEROL SULFATE 3 ML: 2.5; .5 SOLUTION RESPIRATORY (INHALATION) at 08:03

## 2018-03-07 RX ADMIN — LABETALOL HCL 150 MG: 100 TABLET, FILM COATED ORAL at 08:03

## 2018-03-07 RX ADMIN — RAMELTEON 8 MG: 8 TABLET, FILM COATED ORAL at 08:03

## 2018-03-07 RX ADMIN — IPRATROPIUM BROMIDE AND ALBUTEROL SULFATE 3 ML: 2.5; .5 SOLUTION RESPIRATORY (INHALATION) at 12:03

## 2018-03-07 RX ADMIN — ACETAMINOPHEN 650 MG: 325 TABLET, FILM COATED ORAL at 08:03

## 2018-03-07 RX ADMIN — ACETAMINOPHEN 650 MG: 325 TABLET, FILM COATED ORAL at 12:03

## 2018-03-07 RX ADMIN — PANTOPRAZOLE SODIUM 40 MG: 40 TABLET, DELAYED RELEASE ORAL at 10:03

## 2018-03-07 RX ADMIN — IPRATROPIUM BROMIDE AND ALBUTEROL SULFATE 3 ML: 2.5; .5 SOLUTION RESPIRATORY (INHALATION) at 04:03

## 2018-03-07 RX ADMIN — IPRATROPIUM BROMIDE AND ALBUTEROL SULFATE 3 ML: 2.5; .5 SOLUTION RESPIRATORY (INHALATION) at 01:03

## 2018-03-07 RX ADMIN — BENZONATATE 200 MG: 100 CAPSULE ORAL at 08:03

## 2018-03-07 RX ADMIN — ACETAMINOPHEN 650 MG: 325 TABLET, FILM COATED ORAL at 10:03

## 2018-03-07 RX ADMIN — PRAVASTATIN SODIUM 40 MG: 40 TABLET ORAL at 10:03

## 2018-03-07 RX ADMIN — ALISKIREN HEMIFUMARATE 300 MG: 300 TABLET, FILM COATED ORAL at 10:03

## 2018-03-07 RX ADMIN — GUAIFENESIN 200 MG: 200 SOLUTION ORAL at 08:03

## 2018-03-07 RX ADMIN — CHLORTHALIDONE 25 MG: 25 TABLET ORAL at 10:03

## 2018-03-07 RX ADMIN — CHLORTHALIDONE 25 MG: 25 TABLET ORAL at 04:03

## 2018-03-07 RX ADMIN — MOXIFLOXACIN HYDROCHLORIDE 400 MG: 400 TABLET, FILM COATED ORAL at 10:03

## 2018-03-07 RX ADMIN — LABETALOL HCL 150 MG: 100 TABLET, FILM COATED ORAL at 10:03

## 2018-03-07 RX ADMIN — MAGNESIUM OXIDE TAB 400 MG (241.3 MG ELEMENTAL MG) 400 MG: 400 (241.3 MG) TAB at 04:03

## 2018-03-07 NOTE — PLAN OF CARE
Problem: Occupational Therapy Goal  Goal: Occupational Therapy Goal  Outcome: Outcome(s) achieved Date Met: 03/07/18  Eval and D/C - no needs.    FEDE Antonio

## 2018-03-07 NOTE — PLAN OF CARE
Problem: Physical Therapy Goal  Goal: Physical Therapy Goal      No acute PT intervention needed at this time. No goals established.   Outcome: Outcome(s) achieved Date Met: 03/07/18        PT evaluation completed. Pt is supervision/mod (I) with all mobility and transfers. Pt does not demonstrate a need for skilled acute PT services. Eval/DC note to follow.     Leigh Cruz, PT, DPT  03/07/2018

## 2018-03-07 NOTE — ASSESSMENT & PLAN NOTE
Has remained hypertensive with SBP reaching >190, asymptomatic. Difficult to control outpatient with episodes of hypotension. Recently discontinued amlodipine 2.5 daily. Restart home regimen.  - chlorthalidone 25 mg daily -> 50 mg daily  - aliskiren 300 mg daily  - labetalol 150 mg BID

## 2018-03-07 NOTE — PROGRESS NOTES
[facility] Hospital Medicine  Progress Note    Patient Name: Gerda Lai  MRN: [id number]  Patient Class: IP- Inpatient   Admission Date: 3/5/2018  Length of Stay: 2 days  Attending Physician: Josette Ignacio MD  Primary Care Provider: Demario Sellers MD    [facility] Medicine Team: [facility] HOSP MED 4 Kj Montano MD    Subjective:     Principal Problem:Community acquired pneumonia    HPI:  Gerda Lai is a 85-year-old female with HTN and distant history of breast cancer (lumpectomy/radiation) presenting with productive cough and chills. Patient reports subjective chills without fever over the past several days. She subsequently developed a productive cough of green sputum on Saturday night that progressively worsened over Sunday. She went to an urgent care appointment on Sunday and was released with a diagnosis of URI with a prescription for tessalon perles and robitussin dm. Her symptoms did not improve today which prompted her visit to the ED. On interview, patient reports some SOB associated with coughing spells but has no respiratory distress at baseline. She has had no sick contacts that she is aware of. She is a nonsmoker with no respiratory history. She denies HA, fever, N/V, abdo pain, diarrhea, or dysuria.    In the ED, she was HDS in no respiratory distress saturating adequately on room air. Her work-up was significant for WBC 16.9, procal 0.47, and with a normal UA. CXR did not note consolidation or infiltrates. TBili 2.1 evaluated with ultrasound which was unremarkable. She was given ceftriaxone 1 g IV x1 and doxycycline 100 mg PO x1 for CAP.    Hospital Course:  3/6: Subjective improvement today. WBC and procal slightly increased and spiked low-grade fever 100.5 overnight. Continue moxifloxacin  3/7: WBC flat. Procal improved. Patient feels subjectively better. Will continue moxifloxacin.    Interval History: No acute events overnight. Patient slept poorly and has a sore  throat this morning.    Review of Systems   Constitutional: Positive for appetite change. Negative for chills and fever.   HENT: Negative for postnasal drip, rhinorrhea and sore throat.    Respiratory: Positive for cough. Negative for chest tightness, shortness of breath and wheezing.    Cardiovascular: Positive for chest pain (MSK from cough). Negative for palpitations and leg swelling.   Gastrointestinal: Negative for abdominal pain, diarrhea, nausea and vomiting.   Genitourinary: Negative for dysuria and flank pain.   Musculoskeletal: Negative for arthralgias and myalgias.   Skin: Negative for color change and pallor.   Neurological: Negative for light-headedness and headaches.     Objective:     Vital Signs (Most Recent):  Temp: 98 °F (36.7 °C) (03/07/18 1112)  Pulse: 65 (03/07/18 1229)  Resp: 16 (03/07/18 1229)  BP: (!) 168/72 (03/07/18 1122)  SpO2: 97 % (03/07/18 1229) Vital Signs (24h Range):  Temp:  [97.5 °F (36.4 °C)-100.3 °F (37.9 °C)] 98 °F (36.7 °C)  Pulse:  [65-88] 65  Resp:  [14-30] 16  SpO2:  [94 %-99 %] 97 %  BP: (129-196)/(58-92) 168/72     Weight: 64 kg (141 lb 1.5 oz)  Body mass index is 24.99 kg/m².  No intake or output data in the 24 hours ending 03/07/18 1358   Physical Exam   Constitutional: She is oriented to person, place, and time. She appears well-developed and well-nourished. No distress.   HENT:   Head: Normocephalic and atraumatic.   Mouth/Throat: Mucous membranes are dry.   Neck: Normal range of motion. Neck supple. No JVD present.   Cardiovascular: Normal rate, regular rhythm and intact distal pulses.    No murmur heard.  Pulmonary/Chest: Effort normal. No respiratory distress. She has decreased breath sounds in the right lower field. She exhibits tenderness (attributed to cough).   Abdominal: Soft. Bowel sounds are normal. There is no tenderness.   Musculoskeletal: Normal range of motion. She exhibits no edema.   Neurological: She is alert and oriented to person, place, and time. No  cranial nerve deficit.   Skin: Skin is warm and dry.       Significant Labs:   BMP:   Recent Labs  Lab 03/07/18  0358   *      K 3.5      CO2 22*   BUN 32*   CREATININE 1.0   CALCIUM 9.0   MG 1.4*     CBC:   Recent Labs  Lab 03/06/18  0407 03/07/18  0358   WBC 18.86* 18.98*   HGB 10.1* 9.7*   HCT 28.8* 28.2*    212       Significant Imaging: No new imaging    Assessment/Plan:      * Community acquired pneumonia    Productive cough with elevated WBC and procal on admit. CXR equivocal. Flu negative. Treating as CAP.  - No respiratory distress. Patient saturating adequately on RA  - WBC flat and and decreased  - No other sources of infection suspected. UA clean and recent arthrocentesis of L knee is nontender or warm on exam.  - Allergy to clarithromycin documented, however patient is unaware of this allergy  - Moxifloxacin 400 mg daily for 7 days. Appears to be responding to therapy based on procal. Will continue to trend and consider switching to azithromycin tomorrow  - tessalon perles and guaifenesin PRN for cough            Hypertension, essential    Has remained hypertensive with SBP reaching >190, asymptomatic. Difficult to control outpatient with episodes of hypotension. Recently discontinued amlodipine 2.5 daily. Restart home regimen.  - chlorthalidone 25 mg daily -> 50 mg daily  - aliskiren 300 mg daily  - labetalol 150 mg BID          VTE Risk Mitigation         Ordered     enoxaparin injection 40 mg  Daily     Route:  Subcutaneous        03/05/18 1818     Medium Risk of VTE  Once      03/05/18 1818              Kj Montano MD  Department of Hospital Medicine   [facility]

## 2018-03-07 NOTE — ASSESSMENT & PLAN NOTE
Productive cough with elevated WBC and procal on admit. CXR equivocal. Flu negative. Treating as CAP.  - No respiratory distress. Patient saturating adequately on RA  - WBC flat and and decreased  - No other sources of infection suspected. UA clean and recent arthrocentesis of L knee is nontender or warm on exam.  - Allergy to clarithromycin documented, however patient is unaware of this allergy  - Moxifloxacin 400 mg daily for 7 days. Appears to be responding to therapy based on procal. Will continue to trend and consider switching to azithromycin tomorrow  - tessalon perles and guaifenesin PRN for cough

## 2018-03-07 NOTE — NURSING
Dr. Moore notified of pt's vital signs and pt's questions and concerns about respiratory treatments. Pt's BP is

## 2018-03-07 NOTE — SUBJECTIVE & OBJECTIVE
Interval History: No acute events overnight. Patient slept poorly and has a sore throat this morning.    Review of Systems   Constitutional: Positive for appetite change. Negative for chills and fever.   HENT: Negative for postnasal drip, rhinorrhea and sore throat.    Respiratory: Positive for cough. Negative for chest tightness, shortness of breath and wheezing.    Cardiovascular: Positive for chest pain (MSK from cough). Negative for palpitations and leg swelling.   Gastrointestinal: Negative for abdominal pain, diarrhea, nausea and vomiting.   Genitourinary: Negative for dysuria and flank pain.   Musculoskeletal: Negative for arthralgias and myalgias.   Skin: Negative for color change and pallor.   Neurological: Negative for light-headedness and headaches.     Objective:     Vital Signs (Most Recent):  Temp: 98 °F (36.7 °C) (03/07/18 1112)  Pulse: 65 (03/07/18 1229)  Resp: 16 (03/07/18 1229)  BP: (!) 168/72 (03/07/18 1122)  SpO2: 97 % (03/07/18 1229) Vital Signs (24h Range):  Temp:  [97.5 °F (36.4 °C)-100.3 °F (37.9 °C)] 98 °F (36.7 °C)  Pulse:  [65-88] 65  Resp:  [14-30] 16  SpO2:  [94 %-99 %] 97 %  BP: (129-196)/(58-92) 168/72     Weight: 64 kg (141 lb 1.5 oz)  Body mass index is 24.99 kg/m².  No intake or output data in the 24 hours ending 03/07/18 1358   Physical Exam   Constitutional: She is oriented to person, place, and time. She appears well-developed and well-nourished. No distress.   HENT:   Head: Normocephalic and atraumatic.   Mouth/Throat: Mucous membranes are dry.   Neck: Normal range of motion. Neck supple. No JVD present.   Cardiovascular: Normal rate, regular rhythm and intact distal pulses.    No murmur heard.  Pulmonary/Chest: Effort normal. No respiratory distress. She has decreased breath sounds in the right lower field. She exhibits tenderness (attributed to cough).   Abdominal: Soft. Bowel sounds are normal. There is no tenderness.   Musculoskeletal: Normal range of motion. She exhibits no  edema.   Neurological: She is alert and oriented to person, place, and time. No cranial nerve deficit.   Skin: Skin is warm and dry.       Significant Labs:   BMP:   Recent Labs  Lab 03/07/18  0358   *      K 3.5      CO2 22*   BUN 32*   CREATININE 1.0   CALCIUM 9.0   MG 1.4*     CBC:   Recent Labs  Lab 03/06/18  0407 03/07/18  0358   WBC 18.86* 18.98*   HGB 10.1* 9.7*   HCT 28.8* 28.2*    212       Significant Imaging: No new imaging

## 2018-03-07 NOTE — PT/OT/SLP EVAL
"Physical Therapy Evaluation and Discharge Note    Patient Name:  Gerda Lai   MRN:  [id number]    Recommendations:     Discharge Recommendations:  outpatient PT   Discharge Equipment Recommendations: none   Barriers to discharge: None     Assessment:     Gerda Lai is a 85 y.o. female admitted with a medical diagnosis of Community acquired pneumonia.     Pt tolerated session well with no c/o pain or discomfort. Pt demonstrated baseline gait and balance with good safety awareness. Pt does not demonstrate a need for acute PT services. At this time, pt will be d/c from acute PT. Please re-consult should pt's functional status change. Recommend d/c to home and eventually resume OP PT for knee rehabilitation when ready.      Whiteboard updated with correct mobility information. RN/PCT notified.  Appropriate to walk in room or hallway with Supervision only as needed.     Please encourage pt to sit in bedside chair throughout the day to promote OOB mobility and decrease risk of deconditioning while in acute care.       Recent Surgery: * No surgery found *      Plan:     During this hospitalization, patient does not require further acute PT services.  Please re-consult if situation changes.     Plan of Care Reviewed with: patient    Subjective     Communicated with RN prior to session.  Patient found supine,  upon PT entry to room, agreeable to evaluation.      Pt agreeable to PT/OT evaluation. "I'm tired. I didn't get much sleep last night."   Patient comments/goals: to go home  Pain/Comfort: none stated    Patients cultural, spiritual, Catholic conflicts given the current situation: none stated    Living Environment:  Lives with: alone  Lives in: [facility]  PLOF/Level of assist: Mod (I) using SPC for mobility. (I) with all ADLs including driving  Bath: tub/shower  DME: single point cane     Roles/responsibilities: friend, mother, grandmother  Hobbies/Interests: spending time with friends    Assist available upon d/c: " friends able to check on her and assist    Objective:     Patient found with: telemetry, peripheral IV     General Precautions: Standard, fall   Orthopedic Precautions:N/A   Braces: N/A     Exams:  Pt oriented x Person, Place, Time .     Communication: clear/fluent    Follows Commands: Follows multistep  commands    Posture: Rounded shoulder, Head forward and Posterior pelvic tilt  Skin Integrity: Visible skin intact  Edema:  none      Range of Motion and Strength Examination    Right Lower Extremity: WFL as demonstrated with functional mobility    Left Lower Extremity: WFL as demonstrated with functional mobility      Fine Motor Coordination:   intact      Gross Motor Coordination:  WFL      Functional Mobility:  Bed Mobility:  Supine to Sit:  Supervision or Set-up Assistance   Sit to supine: Supervision or Set-up Assistance   Scooting: Independent     Transfers:   Sit to stand:Supervision or Set-up Assistance with Straight Cane from EOB         Gait:   Patient ambulated ~20 feet with Straight Cane with Supervision or Set-up Assistance.  Pt demonstrated step through gait with appropriate step length and weight-shift; no LOB or instability noted.        Balance:  Static Sitting: Independent   Dynamic Sitting: Independent   Static Standing: Supervision or Set-up Assistance   Dynamic Standing: Supervision or Set-up Assistance     Education:  Education provided to pt regarding: PT role/POC; safety with mobility. Verbalized understanding.       AM-PAC 6 CLICK MOBILITY  Total Score:24       Patient left supine with all lines intact and call button in reach.    GOALS:    Physical Therapy Goals     Not on file          Multidisciplinary Problems (Resolved)        Problem: Physical Therapy Goal    Goal Priority Disciplines Outcome Goal Variances Interventions   Physical Therapy Goal   (Resolved)     PT/OT, PT Outcome(s) achieved     Description:      No acute PT intervention needed at this time. No goals established.                      History:     Past Medical History:   Diagnosis Date    Arthritis     Basal cell carcinoma 09/2016    right post auricular neck     Breast cancer     Cataract     Fibromyalgia     Hyperlipidemia     Hypertension     Personal history of colonic polyps     SCC (squamous cell carcinoma) 2015    R chest    SCC (squamous cell carcinoma) 2016    left medial shoulder    SCC (squamous cell carcinoma) 2017    left knee    Squamous cell carcinoma 2015    right forearm    Thyroid disease     Vaginitis        Past Surgical History:   Procedure Laterality Date    ADENOIDECTOMY      CATARACT EXTRACTION W/  INTRAOCULAR LENS IMPLANT Bilateral     MASTECTOMY      partial right    thyriodectomy      partial    tonsillectomy      TOTAL HIP ARTHROPLASTY      right    Yag  Left        Clinical Decision Making:     Personal factors/comorbidities: arthritis; HLD; HTN; h/o CA. The listed co-morbidities and personal factors impact pt's current level and progress with functional mobility and independence.   Body systems elements affected: lower extremities, pulmonary systerm, cardiovascular system  Impairments: see assessment  Clinical Presentation: stable  Functional Outcome Tools: AMPAC, MMT, ROM  Evaluation Complexity Level: low    Time Tracking:     PT Received On: 03/07/18  PT Start Time: 1118     PT Stop Time: 1134  PT Total Time (min): 16 min     Billable Minutes: Evaluation 16      Leigh Cruz PT, DPT  Pager: [phone]  3/7/2018

## 2018-03-08 LAB
ANION GAP SERPL CALC-SCNC: 11 MMOL/L
BASOPHILS # BLD AUTO: 0.03 K/UL
BASOPHILS NFR BLD: 0.2 %
BUN SERPL-MCNC: 29 MG/DL
CALCIUM SERPL-MCNC: 9.3 MG/DL
CHLORIDE SERPL-SCNC: 104 MMOL/L
CO2 SERPL-SCNC: 24 MMOL/L
CREAT SERPL-MCNC: 1 MG/DL
DIFFERENTIAL METHOD: ABNORMAL
ENTEROVIRUS: NOT DETECTED
EOSINOPHIL # BLD AUTO: 0.2 K/UL
EOSINOPHIL NFR BLD: 1.3 %
ERYTHROCYTE [DISTWIDTH] IN BLOOD BY AUTOMATED COUNT: 12.1 %
EST. GFR  (AFRICAN AMERICAN): 59.4 ML/MIN/1.73 M^2
EST. GFR  (NON AFRICAN AMERICAN): 51.5 ML/MIN/1.73 M^2
GLUCOSE SERPL-MCNC: 97 MG/DL
HCT VFR BLD AUTO: 30.2 %
HGB BLD-MCNC: 10.2 G/DL
HUMAN BOCAVIRUS: NOT DETECTED
HUMAN CORONAVIRUS, COMMON COLD VIRUS: NOT DETECTED
IMM GRANULOCYTES # BLD AUTO: 0.54 K/UL
IMM GRANULOCYTES NFR BLD AUTO: 3.1 %
INFLUENZA A - H1N1-09: NOT DETECTED
LYMPHOCYTES # BLD AUTO: 1.7 K/UL
LYMPHOCYTES NFR BLD: 9.7 %
MAGNESIUM SERPL-MCNC: 1.6 MG/DL
MCH RBC QN AUTO: 32.8 PG
MCHC RBC AUTO-ENTMCNC: 33.8 G/DL
MCV RBC AUTO: 97 FL
MONOCYTES # BLD AUTO: 1.7 K/UL
MONOCYTES NFR BLD: 9.4 %
NEUTROPHILS # BLD AUTO: 13.4 K/UL
NEUTROPHILS NFR BLD: 76.3 %
NRBC BLD-RTO: 0 /100 WBC
PARAINFLUENZA: NOT DETECTED
PHOSPHATE SERPL-MCNC: 4.2 MG/DL
PLATELET # BLD AUTO: 254 K/UL
PMV BLD AUTO: 9.2 FL
POTASSIUM SERPL-SCNC: 3.7 MMOL/L
PROCALCITONIN SERPL IA-MCNC: 0.33 NG/ML
RBC # BLD AUTO: 3.11 M/UL
RVP - ADENOVIRUS: NOT DETECTED
RVP - HUMAN METAPNEUMOVIRUS (HMPV): NOT DETECTED
RVP - INFLUENZA A: NOT DETECTED
RVP - INFLUENZA B: NOT DETECTED
RVP - RESPIRATORY SYNCTIAL VIRUS (RSV) A: NOT DETECTED
RVP - RESPIRATORY VIRAL PANEL, SOURCE: NORMAL
RVP - RHINOVIRUS: NOT DETECTED
SODIUM SERPL-SCNC: 139 MMOL/L
WBC # BLD AUTO: 17.59 K/UL

## 2018-03-08 PROCEDURE — 80048 BASIC METABOLIC PNL TOTAL CA: CPT

## 2018-03-08 PROCEDURE — 25000242 PHARM REV CODE 250 ALT 637 W/ HCPCS: Performed by: STUDENT IN AN ORGANIZED HEALTH CARE EDUCATION/TRAINING PROGRAM

## 2018-03-08 PROCEDURE — 36415 COLL VENOUS BLD VENIPUNCTURE: CPT

## 2018-03-08 PROCEDURE — 84145 PROCALCITONIN (PCT): CPT

## 2018-03-08 PROCEDURE — 99232 SBSQ HOSP IP/OBS MODERATE 35: CPT | Mod: ,,, | Performed by: HOSPITALIST

## 2018-03-08 PROCEDURE — 84100 ASSAY OF PHOSPHORUS: CPT

## 2018-03-08 PROCEDURE — 25000003 PHARM REV CODE 250

## 2018-03-08 PROCEDURE — 25000003 PHARM REV CODE 250: Performed by: STUDENT IN AN ORGANIZED HEALTH CARE EDUCATION/TRAINING PROGRAM

## 2018-03-08 PROCEDURE — 83735 ASSAY OF MAGNESIUM: CPT

## 2018-03-08 PROCEDURE — 94761 N-INVAS EAR/PLS OXIMETRY MLT: CPT

## 2018-03-08 PROCEDURE — 11000001 HC ACUTE MED/SURG PRIVATE ROOM

## 2018-03-08 PROCEDURE — 85025 COMPLETE CBC W/AUTO DIFF WBC: CPT

## 2018-03-08 PROCEDURE — 63600175 PHARM REV CODE 636 W HCPCS: Performed by: STUDENT IN AN ORGANIZED HEALTH CARE EDUCATION/TRAINING PROGRAM

## 2018-03-08 PROCEDURE — 94640 AIRWAY INHALATION TREATMENT: CPT

## 2018-03-08 RX ADMIN — LABETALOL HCL 150 MG: 100 TABLET, FILM COATED ORAL at 08:03

## 2018-03-08 RX ADMIN — ACETAMINOPHEN 650 MG: 325 TABLET, FILM COATED ORAL at 09:03

## 2018-03-08 RX ADMIN — IPRATROPIUM BROMIDE AND ALBUTEROL SULFATE 3 ML: 2.5; .5 SOLUTION RESPIRATORY (INHALATION) at 07:03

## 2018-03-08 RX ADMIN — BENZONATATE 200 MG: 100 CAPSULE ORAL at 11:03

## 2018-03-08 RX ADMIN — IPRATROPIUM BROMIDE AND ALBUTEROL SULFATE 3 ML: 2.5; .5 SOLUTION RESPIRATORY (INHALATION) at 12:03

## 2018-03-08 RX ADMIN — ACETAMINOPHEN 650 MG: 325 TABLET, FILM COATED ORAL at 08:03

## 2018-03-08 RX ADMIN — PRAVASTATIN SODIUM 40 MG: 40 TABLET ORAL at 08:03

## 2018-03-08 RX ADMIN — IPRATROPIUM BROMIDE AND ALBUTEROL SULFATE 3 ML: 2.5; .5 SOLUTION RESPIRATORY (INHALATION) at 04:03

## 2018-03-08 RX ADMIN — LABETALOL HCL 150 MG: 100 TABLET, FILM COATED ORAL at 10:03

## 2018-03-08 RX ADMIN — IPRATROPIUM BROMIDE AND ALBUTEROL SULFATE 3 ML: 2.5; .5 SOLUTION RESPIRATORY (INHALATION) at 08:03

## 2018-03-08 RX ADMIN — GUAIFENESIN 200 MG: 200 SOLUTION ORAL at 09:03

## 2018-03-08 RX ADMIN — ENOXAPARIN SODIUM 40 MG: 100 INJECTION SUBCUTANEOUS at 04:03

## 2018-03-08 RX ADMIN — CHLORTHALIDONE 50 MG: 25 TABLET ORAL at 08:03

## 2018-03-08 RX ADMIN — ALISKIREN HEMIFUMARATE 300 MG: 300 TABLET, FILM COATED ORAL at 08:03

## 2018-03-08 RX ADMIN — MOXIFLOXACIN HYDROCHLORIDE 400 MG: 400 TABLET, FILM COATED ORAL at 08:03

## 2018-03-08 RX ADMIN — RAMELTEON 8 MG: 8 TABLET, FILM COATED ORAL at 10:03

## 2018-03-08 RX ADMIN — PANTOPRAZOLE SODIUM 40 MG: 40 TABLET, DELAYED RELEASE ORAL at 08:03

## 2018-03-08 NOTE — PLAN OF CARE
Problem: Patient Care Overview  Goal: Plan of Care Review  Outcome: Ongoing (interventions implemented as appropriate)  Free from falls/injuires. VSS. Afebrile. No c/o pain. Skin intact. Received breathing tx q 4 hrs. Pt reports feeling much better. Will continue to monitor. Side rails up x 3, wheels locked, bed in lowest position, call light in reach.

## 2018-03-08 NOTE — ASSESSMENT & PLAN NOTE
Productive cough with elevated WBC and procal on admit. CXR equivocal. Flu negative. Treating as CAP.  - No respiratory distress. Patient saturating adequately on RA  - WBC decreasing, but slowly, may need to prolong antibiotic course.  Procal decreasing  - continue avelox, anticipate discharge tomorrow

## 2018-03-08 NOTE — PROGRESS NOTES
[facility] Hospital Medicine  Progress Note    Patient Name: Gerda Lai  MRN: [id number]  Patient Class: IP- Inpatient   Admission Date: 3/5/2018  Length of Stay: 3 days  Attending Physician: Josette Ignacio MD  Primary Care Provider: Demario Sellers MD    [facility] Medicine Team: [facility] [place] HOSP MED 4 Rory Beltran MD    Subjective:     Principal Problem:Community acquired pneumonia    HPI:  Gerda Lai is a 85-year-old female with HTN and distant history of breast cancer (lumpectomy/radiation) presenting with productive cough and chills. Patient reports subjective chills without fever over the past several days. She subsequently developed a productive cough of green sputum on Saturday night that progressively worsened over Sunday. She went to an urgent care appointment on Sunday and was released with a diagnosis of URI with a prescription for tessalon perles and robitussin dm. Her symptoms did not improve today which prompted her visit to the ED. On interview, patient reports some SOB associated with coughing spells but has no respiratory distress at baseline. She has had no sick contacts that she is aware of. She is a nonsmoker with no respiratory history. She denies HA, fever, N/V, abdo pain, diarrhea, or dysuria.    In the ED, she was HDS in no respiratory distress saturating adequately on room air. Her work-up was significant for WBC 16.9, procal 0.47, and with a normal UA. CXR did not note consolidation or infiltrates. TBili 2.1 evaluated with ultrasound which was unremarkable. She was given ceftriaxone 1 g IV x1 and doxycycline 100 mg PO x1 for CAP.    Interval History: Patient reports good night's rest.  Notes continued coughing this AM, but feels subjectively improved from day prior.  Denies fevers.      Review of Systems   Constitutional: Positive for appetite change. Negative for chills and fever.   HENT: Negative for postnasal drip, rhinorrhea and sore throat.    Respiratory:  Positive for cough. Negative for chest tightness, shortness of breath and wheezing.    Cardiovascular: Positive for chest pain (MSK from cough). Negative for palpitations and leg swelling.   Gastrointestinal: Negative for abdominal pain, diarrhea, nausea and vomiting.   Genitourinary: Negative for dysuria and flank pain.   Musculoskeletal: Negative for arthralgias and myalgias.   Skin: Negative for color change and pallor.   Neurological: Negative for light-headedness and headaches.     Objective:     Vital Signs (Most Recent):  Temp: 98.2 °F (36.8 °C) (03/08/18 1109)  Pulse: 67 (03/08/18 1247)  Resp: 16 (03/08/18 1247)  BP: (!) 130/52 (03/08/18 1109)  SpO2: (!) 92 % (03/08/18 1247) Vital Signs (24h Range):  Temp:  [97.6 °F (36.4 °C)-99.5 °F (37.5 °C)] 98.2 °F (36.8 °C)  Pulse:  [67-88] 67  Resp:  [15-20] 16  SpO2:  [91 %-96 %] 92 %  BP: (130-195)/(52-83) 130/52     Weight: 64 kg (141 lb 1.5 oz)  Body mass index is 24.99 kg/m².  No intake or output data in the 24 hours ending 03/08/18 1617   Physical Exam   Constitutional: She is oriented to person, place, and time. She appears well-developed and well-nourished. No distress.   HENT:   Head: Normocephalic and atraumatic.   Mouth/Throat: Mucous membranes are dry.   Neck: Normal range of motion. Neck supple. No JVD present.   Cardiovascular: Normal rate, regular rhythm and intact distal pulses.    No murmur heard.  Pulmonary/Chest: Effort normal. No respiratory distress. She has decreased breath sounds in the right lower field. She exhibits tenderness (attributed to cough).   Abdominal: Soft. Bowel sounds are normal. There is no tenderness.   Musculoskeletal: Normal range of motion. She exhibits no edema.   Neurological: She is alert and oriented to person, place, and time. No cranial nerve deficit.   Skin: Skin is warm and dry.       Significant Labs:   BMP:     Recent Labs  Lab 03/08/18  0536   GLU 97      K 3.7      CO2 24   BUN 29*   CREATININE 1.0    CALCIUM 9.3   MG 1.6     CBC:     Recent Labs  Lab 03/07/18  0358 03/08/18  0536   WBC 18.98* 17.59*   HGB 9.7* 10.2*   HCT 28.2* 30.2*    254       Significant Imaging: No new imaging    Assessment/Plan:      * Community acquired pneumonia    Productive cough with elevated WBC and procal on admit. CXR equivocal. Flu negative. Treating as CAP.  - No respiratory distress. Patient saturating adequately on RA  - WBC decreasing, but slowly, may need to prolong antibiotic course.  Procal decreasing  - continue avelox, anticipate discharge tomorrow             Hypertension, essential    Has remained hypertensive with SBP reaching >190, asymptomatic. Difficult to control outpatient with episodes of hypotension. Recently discontinued amlodipine 2.5 daily. Restart home regimen.  - chlorthalidone 25 mg daily -> 50 mg daily  - aliskiren 300 mg daily  - labetalol 150 mg BID          VTE Risk Mitigation         Ordered     enoxaparin injection 40 mg  Daily     Route:  Subcutaneous        03/05/18 1818     Medium Risk of VTE  Once      03/05/18 1818              Rory Beltran MD  Department of Hospital Medicine   [facility]

## 2018-03-08 NOTE — SUBJECTIVE & OBJECTIVE
Interval History: Patient reports good night's rest.  Notes continued coughing this AM, but feels subjectively improved from day prior.  Denies fevers.      Review of Systems   Constitutional: Positive for appetite change. Negative for chills and fever.   HENT: Negative for postnasal drip, rhinorrhea and sore throat.    Respiratory: Positive for cough. Negative for chest tightness, shortness of breath and wheezing.    Cardiovascular: Positive for chest pain (MSK from cough). Negative for palpitations and leg swelling.   Gastrointestinal: Negative for abdominal pain, diarrhea, nausea and vomiting.   Genitourinary: Negative for dysuria and flank pain.   Musculoskeletal: Negative for arthralgias and myalgias.   Skin: Negative for color change and pallor.   Neurological: Negative for light-headedness and headaches.     Objective:     Vital Signs (Most Recent):  Temp: 98.2 °F (36.8 °C) (03/08/18 1109)  Pulse: 67 (03/08/18 1247)  Resp: 16 (03/08/18 1247)  BP: (!) 130/52 (03/08/18 1109)  SpO2: (!) 92 % (03/08/18 1247) Vital Signs (24h Range):  Temp:  [97.6 °F (36.4 °C)-99.5 °F (37.5 °C)] 98.2 °F (36.8 °C)  Pulse:  [67-88] 67  Resp:  [15-20] 16  SpO2:  [91 %-96 %] 92 %  BP: (130-195)/(52-83) 130/52     Weight: 64 kg (141 lb 1.5 oz)  Body mass index is 24.99 kg/m².  No intake or output data in the 24 hours ending 03/08/18 1617   Physical Exam   Constitutional: She is oriented to person, place, and time. She appears well-developed and well-nourished. No distress.   HENT:   Head: Normocephalic and atraumatic.   Mouth/Throat: Mucous membranes are dry.   Neck: Normal range of motion. Neck supple. No JVD present.   Cardiovascular: Normal rate, regular rhythm and intact distal pulses.    No murmur heard.  Pulmonary/Chest: Effort normal. No respiratory distress. She has decreased breath sounds in the right lower field. She exhibits tenderness (attributed to cough).   Abdominal: Soft. Bowel sounds are normal. There is no tenderness.    Musculoskeletal: Normal range of motion. She exhibits no edema.   Neurological: She is alert and oriented to person, place, and time. No cranial nerve deficit.   Skin: Skin is warm and dry.       Significant Labs:   BMP:     Recent Labs  Lab 03/08/18  0536   GLU 97      K 3.7      CO2 24   BUN 29*   CREATININE 1.0   CALCIUM 9.3   MG 1.6     CBC:     Recent Labs  Lab 03/07/18  0358 03/08/18  0536   WBC 18.98* 17.59*   HGB 9.7* 10.2*   HCT 28.2* 30.2*    254       Significant Imaging: No new imaging

## 2018-03-09 LAB
ANION GAP SERPL CALC-SCNC: 10 MMOL/L
BASOPHILS # BLD AUTO: 0.04 K/UL
BASOPHILS NFR BLD: 0.2 %
BILIRUB UR QL STRIP: NEGATIVE
BNP SERPL-MCNC: 34 PG/ML
BUN SERPL-MCNC: 28 MG/DL
CALCIUM SERPL-MCNC: 9.1 MG/DL
CHLORIDE SERPL-SCNC: 104 MMOL/L
CLARITY UR REFRACT.AUTO: CLEAR
CO2 SERPL-SCNC: 24 MMOL/L
COLOR UR AUTO: YELLOW
CREAT SERPL-MCNC: 0.8 MG/DL
DIFFERENTIAL METHOD: ABNORMAL
EOSINOPHIL # BLD AUTO: 0.3 K/UL
EOSINOPHIL NFR BLD: 1.5 %
ERYTHROCYTE [DISTWIDTH] IN BLOOD BY AUTOMATED COUNT: 12 %
EST. GFR  (AFRICAN AMERICAN): >60 ML/MIN/1.73 M^2
EST. GFR  (NON AFRICAN AMERICAN): >60 ML/MIN/1.73 M^2
GLUCOSE SERPL-MCNC: 98 MG/DL
GLUCOSE UR QL STRIP: NEGATIVE
HCT VFR BLD AUTO: 29.3 %
HGB BLD-MCNC: 10.1 G/DL
HGB UR QL STRIP: NEGATIVE
IMM GRANULOCYTES # BLD AUTO: 0.73 K/UL
IMM GRANULOCYTES NFR BLD AUTO: 4.1 %
KETONES UR QL STRIP: NEGATIVE
LEUKOCYTE ESTERASE UR QL STRIP: ABNORMAL
LYMPHOCYTES # BLD AUTO: 1.8 K/UL
LYMPHOCYTES NFR BLD: 10.2 %
MAGNESIUM SERPL-MCNC: 1.6 MG/DL
MCH RBC QN AUTO: 33.7 PG
MCHC RBC AUTO-ENTMCNC: 34.5 G/DL
MCV RBC AUTO: 98 FL
MICROSCOPIC COMMENT: ABNORMAL
MONOCYTES # BLD AUTO: 1.7 K/UL
MONOCYTES NFR BLD: 9.6 %
NEUTROPHILS # BLD AUTO: 13.4 K/UL
NEUTROPHILS NFR BLD: 74.4 %
NITRITE UR QL STRIP: NEGATIVE
NRBC BLD-RTO: 0 /100 WBC
PATH REV BLD -IMP: NORMAL
PATH REV BLD -IMP: NORMAL
PH UR STRIP: 5 [PH] (ref 5–8)
PHOSPHATE SERPL-MCNC: 3.8 MG/DL
PLATELET # BLD AUTO: 240 K/UL
PMV BLD AUTO: 9 FL
POTASSIUM SERPL-SCNC: 3.5 MMOL/L
PROCALCITONIN SERPL IA-MCNC: 0.25 NG/ML
PROT UR QL STRIP: NEGATIVE
RBC # BLD AUTO: 3 M/UL
SODIUM SERPL-SCNC: 138 MMOL/L
SP GR UR STRIP: 1.01 (ref 1–1.03)
SQUAMOUS #/AREA URNS AUTO: 0 /HPF
URN SPEC COLLECT METH UR: ABNORMAL
UROBILINOGEN UR STRIP-ACNC: NEGATIVE EU/DL
WBC # BLD AUTO: 18.01 K/UL
WBC #/AREA URNS AUTO: 7 /HPF (ref 0–5)

## 2018-03-09 PROCEDURE — 94640 AIRWAY INHALATION TREATMENT: CPT

## 2018-03-09 PROCEDURE — 85025 COMPLETE CBC W/AUTO DIFF WBC: CPT

## 2018-03-09 PROCEDURE — 85060 BLOOD SMEAR INTERPRETATION: CPT | Mod: ,,, | Performed by: PATHOLOGY

## 2018-03-09 PROCEDURE — 25000003 PHARM REV CODE 250

## 2018-03-09 PROCEDURE — 63600175 PHARM REV CODE 636 W HCPCS

## 2018-03-09 PROCEDURE — 63600175 PHARM REV CODE 636 W HCPCS: Performed by: STUDENT IN AN ORGANIZED HEALTH CARE EDUCATION/TRAINING PROGRAM

## 2018-03-09 PROCEDURE — 94668 MNPJ CHEST WALL SBSQ: CPT

## 2018-03-09 PROCEDURE — 25000242 PHARM REV CODE 250 ALT 637 W/ HCPCS: Performed by: STUDENT IN AN ORGANIZED HEALTH CARE EDUCATION/TRAINING PROGRAM

## 2018-03-09 PROCEDURE — 94761 N-INVAS EAR/PLS OXIMETRY MLT: CPT

## 2018-03-09 PROCEDURE — 11000001 HC ACUTE MED/SURG PRIVATE ROOM

## 2018-03-09 PROCEDURE — 25000242 PHARM REV CODE 250 ALT 637 W/ HCPCS: Performed by: HOSPITALIST

## 2018-03-09 PROCEDURE — 83735 ASSAY OF MAGNESIUM: CPT

## 2018-03-09 PROCEDURE — 81001 URINALYSIS AUTO W/SCOPE: CPT

## 2018-03-09 PROCEDURE — 36415 COLL VENOUS BLD VENIPUNCTURE: CPT

## 2018-03-09 PROCEDURE — 99232 SBSQ HOSP IP/OBS MODERATE 35: CPT | Mod: ,,, | Performed by: HOSPITALIST

## 2018-03-09 PROCEDURE — 25000003 PHARM REV CODE 250: Performed by: STUDENT IN AN ORGANIZED HEALTH CARE EDUCATION/TRAINING PROGRAM

## 2018-03-09 PROCEDURE — 83880 ASSAY OF NATRIURETIC PEPTIDE: CPT

## 2018-03-09 PROCEDURE — 84100 ASSAY OF PHOSPHORUS: CPT

## 2018-03-09 PROCEDURE — 80048 BASIC METABOLIC PNL TOTAL CA: CPT

## 2018-03-09 PROCEDURE — 84145 PROCALCITONIN (PCT): CPT

## 2018-03-09 RX ORDER — AZITHROMYCIN 250 MG/1
500 TABLET, FILM COATED ORAL DAILY
Status: DISCONTINUED | OUTPATIENT
Start: 2018-03-09 | End: 2018-03-11 | Stop reason: HOSPADM

## 2018-03-09 RX ORDER — CEFTRIAXONE 1 G/1
1 INJECTION, POWDER, FOR SOLUTION INTRAMUSCULAR; INTRAVENOUS
Status: DISCONTINUED | OUTPATIENT
Start: 2018-03-09 | End: 2018-03-11 | Stop reason: HOSPADM

## 2018-03-09 RX ORDER — IPRATROPIUM BROMIDE AND ALBUTEROL SULFATE 2.5; .5 MG/3ML; MG/3ML
3 SOLUTION RESPIRATORY (INHALATION)
Status: DISCONTINUED | OUTPATIENT
Start: 2018-03-09 | End: 2018-03-11 | Stop reason: HOSPADM

## 2018-03-09 RX ORDER — AZITHROMYCIN 250 MG/1
TABLET, FILM COATED ORAL
Status: COMPLETED
Start: 2018-03-09 | End: 2018-03-09

## 2018-03-09 RX ORDER — CEFTRIAXONE 1 G/1
INJECTION, POWDER, FOR SOLUTION INTRAMUSCULAR; INTRAVENOUS
Status: COMPLETED
Start: 2018-03-09 | End: 2018-03-09

## 2018-03-09 RX ADMIN — PANTOPRAZOLE SODIUM 40 MG: 40 TABLET, DELAYED RELEASE ORAL at 09:03

## 2018-03-09 RX ADMIN — MOXIFLOXACIN HYDROCHLORIDE 400 MG: 400 TABLET, FILM COATED ORAL at 09:03

## 2018-03-09 RX ADMIN — AZITHROMYCIN: 250 TABLET, FILM COATED ORAL at 02:03

## 2018-03-09 RX ADMIN — LABETALOL HCL 150 MG: 100 TABLET, FILM COATED ORAL at 09:03

## 2018-03-09 RX ADMIN — IPRATROPIUM BROMIDE AND ALBUTEROL SULFATE 3 ML: 2.5; .5 SOLUTION RESPIRATORY (INHALATION) at 08:03

## 2018-03-09 RX ADMIN — CHLORTHALIDONE 50 MG: 25 TABLET ORAL at 09:03

## 2018-03-09 RX ADMIN — ACETAMINOPHEN 650 MG: 325 TABLET, FILM COATED ORAL at 09:03

## 2018-03-09 RX ADMIN — CEFTRIAXONE SODIUM: 1 INJECTION, POWDER, FOR SOLUTION INTRAMUSCULAR; INTRAVENOUS at 02:03

## 2018-03-09 RX ADMIN — IPRATROPIUM BROMIDE AND ALBUTEROL SULFATE 3 ML: .5; 3 SOLUTION RESPIRATORY (INHALATION) at 12:03

## 2018-03-09 RX ADMIN — ALISKIREN HEMIFUMARATE 300 MG: 300 TABLET, FILM COATED ORAL at 09:03

## 2018-03-09 RX ADMIN — IPRATROPIUM BROMIDE AND ALBUTEROL SULFATE 3 ML: .5; 3 SOLUTION RESPIRATORY (INHALATION) at 07:03

## 2018-03-09 RX ADMIN — AZITHROMYCIN 500 MG: 250 TABLET, FILM COATED ORAL at 02:03

## 2018-03-09 RX ADMIN — ENOXAPARIN SODIUM 40 MG: 100 INJECTION SUBCUTANEOUS at 04:03

## 2018-03-09 RX ADMIN — PRAVASTATIN SODIUM 40 MG: 40 TABLET ORAL at 09:03

## 2018-03-09 RX ADMIN — AZITHROMYCIN DIHYDRATE: 250 TABLET, FILM COATED ORAL at 02:03

## 2018-03-09 RX ADMIN — CEFTRIAXONE SODIUM 1 G: 1 INJECTION, POWDER, FOR SOLUTION INTRAMUSCULAR; INTRAVENOUS at 02:03

## 2018-03-10 PROBLEM — E87.1 HYPONATREMIA: Status: ACTIVE | Noted: 2018-03-10

## 2018-03-10 LAB
ALBUMIN SERPL BCP-MCNC: 2.7 G/DL
ALP SERPL-CCNC: 133 U/L
ALT SERPL W/O P-5'-P-CCNC: 37 U/L
ANION GAP SERPL CALC-SCNC: 11 MMOL/L
ANION GAP SERPL CALC-SCNC: 8 MMOL/L
ANISOCYTOSIS BLD QL SMEAR: SLIGHT
AST SERPL-CCNC: 37 U/L
BACTERIA BLD CULT: NORMAL
BACTERIA BLD CULT: NORMAL
BASOPHILS # BLD AUTO: ABNORMAL K/UL
BASOPHILS NFR BLD: 0 %
BILIRUB DIRECT SERPL-MCNC: 0.4 MG/DL
BILIRUB SERPL-MCNC: 0.7 MG/DL
BUN SERPL-MCNC: 25 MG/DL
BUN SERPL-MCNC: 29 MG/DL
CALCIUM SERPL-MCNC: 8.7 MG/DL
CALCIUM SERPL-MCNC: 8.8 MG/DL
CHLORIDE SERPL-SCNC: 106 MMOL/L
CHLORIDE SERPL-SCNC: 96 MMOL/L
CO2 SERPL-SCNC: 22 MMOL/L
CO2 SERPL-SCNC: 24 MMOL/L
CREAT SERPL-MCNC: 1 MG/DL
CREAT SERPL-MCNC: 1 MG/DL
DIFFERENTIAL METHOD: ABNORMAL
EOSINOPHIL # BLD AUTO: ABNORMAL K/UL
EOSINOPHIL NFR BLD: 0 %
ERYTHROCYTE [DISTWIDTH] IN BLOOD BY AUTOMATED COUNT: 12.1 %
EST. GFR  (AFRICAN AMERICAN): 59.4 ML/MIN/1.73 M^2
EST. GFR  (AFRICAN AMERICAN): 59.4 ML/MIN/1.73 M^2
EST. GFR  (NON AFRICAN AMERICAN): 51.5 ML/MIN/1.73 M^2
EST. GFR  (NON AFRICAN AMERICAN): 51.5 ML/MIN/1.73 M^2
GLUCOSE SERPL-MCNC: 104 MG/DL
GLUCOSE SERPL-MCNC: 108 MG/DL
HCT VFR BLD AUTO: 28.7 %
HGB BLD-MCNC: 9.8 G/DL
IMM GRANULOCYTES # BLD AUTO: ABNORMAL K/UL
IMM GRANULOCYTES NFR BLD AUTO: ABNORMAL %
LYMPHOCYTES # BLD AUTO: ABNORMAL K/UL
LYMPHOCYTES NFR BLD: 11 %
MAGNESIUM SERPL-MCNC: 1.5 MG/DL
MCH RBC QN AUTO: 33.6 PG
MCHC RBC AUTO-ENTMCNC: 34.1 G/DL
MCV RBC AUTO: 98 FL
MONOCYTES # BLD AUTO: ABNORMAL K/UL
MONOCYTES NFR BLD: 6 %
MYELOCYTES NFR BLD MANUAL: 1 %
NEUTROPHILS # BLD AUTO: ABNORMAL K/UL
NEUTROPHILS NFR BLD: 82 %
NRBC BLD-RTO: 0 /100 WBC
OSMOLALITY SERPL: 294 MOSM/KG
OSMOLALITY UR: 656 MOSM/KG
OVALOCYTES BLD QL SMEAR: ABNORMAL
PHOSPHATE SERPL-MCNC: 3.7 MG/DL
PLATELET # BLD AUTO: 243 K/UL
PLATELET BLD QL SMEAR: ABNORMAL
PMV BLD AUTO: 9.2 FL
POIKILOCYTOSIS BLD QL SMEAR: SLIGHT
POTASSIUM SERPL-SCNC: 3.3 MMOL/L
POTASSIUM SERPL-SCNC: 4.3 MMOL/L
PROCALCITONIN SERPL IA-MCNC: 0.23 NG/ML
PROT SERPL-MCNC: 6.7 G/DL
RBC # BLD AUTO: 2.92 M/UL
SODIUM SERPL-SCNC: 128 MMOL/L
SODIUM SERPL-SCNC: 139 MMOL/L
SODIUM UR-SCNC: 176 MMOL/L
WBC # BLD AUTO: 17.77 K/UL

## 2018-03-10 PROCEDURE — 25000003 PHARM REV CODE 250

## 2018-03-10 PROCEDURE — 85007 BL SMEAR W/DIFF WBC COUNT: CPT

## 2018-03-10 PROCEDURE — 94640 AIRWAY INHALATION TREATMENT: CPT

## 2018-03-10 PROCEDURE — 84100 ASSAY OF PHOSPHORUS: CPT

## 2018-03-10 PROCEDURE — 84145 PROCALCITONIN (PCT): CPT

## 2018-03-10 PROCEDURE — 36415 COLL VENOUS BLD VENIPUNCTURE: CPT

## 2018-03-10 PROCEDURE — 99232 SBSQ HOSP IP/OBS MODERATE 35: CPT | Mod: ,,, | Performed by: HOSPITALIST

## 2018-03-10 PROCEDURE — 80048 BASIC METABOLIC PNL TOTAL CA: CPT

## 2018-03-10 PROCEDURE — 99900035 HC TECH TIME PER 15 MIN (STAT)

## 2018-03-10 PROCEDURE — 63600175 PHARM REV CODE 636 W HCPCS: Performed by: STUDENT IN AN ORGANIZED HEALTH CARE EDUCATION/TRAINING PROGRAM

## 2018-03-10 PROCEDURE — 11000001 HC ACUTE MED/SURG PRIVATE ROOM

## 2018-03-10 PROCEDURE — 25000242 PHARM REV CODE 250 ALT 637 W/ HCPCS: Performed by: HOSPITALIST

## 2018-03-10 PROCEDURE — 83930 ASSAY OF BLOOD OSMOLALITY: CPT

## 2018-03-10 PROCEDURE — 83935 ASSAY OF URINE OSMOLALITY: CPT

## 2018-03-10 PROCEDURE — 94761 N-INVAS EAR/PLS OXIMETRY MLT: CPT

## 2018-03-10 PROCEDURE — 25000003 PHARM REV CODE 250: Performed by: STUDENT IN AN ORGANIZED HEALTH CARE EDUCATION/TRAINING PROGRAM

## 2018-03-10 PROCEDURE — 85027 COMPLETE CBC AUTOMATED: CPT

## 2018-03-10 PROCEDURE — 80076 HEPATIC FUNCTION PANEL: CPT

## 2018-03-10 PROCEDURE — 83735 ASSAY OF MAGNESIUM: CPT

## 2018-03-10 PROCEDURE — 84300 ASSAY OF URINE SODIUM: CPT

## 2018-03-10 PROCEDURE — 94668 MNPJ CHEST WALL SBSQ: CPT

## 2018-03-10 PROCEDURE — 94664 DEMO&/EVAL PT USE INHALER: CPT

## 2018-03-10 RX ORDER — LANOLIN ALCOHOL/MO/W.PET/CERES
400 CREAM (GRAM) TOPICAL 2 TIMES DAILY
Status: DISCONTINUED | OUTPATIENT
Start: 2018-03-10 | End: 2018-03-11 | Stop reason: HOSPADM

## 2018-03-10 RX ORDER — LABETALOL 200 MG/1
200 TABLET, FILM COATED ORAL 2 TIMES DAILY
Status: DISCONTINUED | OUTPATIENT
Start: 2018-03-10 | End: 2018-03-11 | Stop reason: HOSPADM

## 2018-03-10 RX ORDER — POTASSIUM CHLORIDE 20 MEQ/15ML
40 SOLUTION ORAL ONCE
Status: COMPLETED | OUTPATIENT
Start: 2018-03-10 | End: 2018-03-10

## 2018-03-10 RX ORDER — CHLORTHALIDONE 25 MG/1
25 TABLET ORAL DAILY
Status: DISCONTINUED | OUTPATIENT
Start: 2018-03-11 | End: 2018-03-11 | Stop reason: HOSPADM

## 2018-03-10 RX ORDER — SODIUM CHLORIDE 9 MG/ML
INJECTION, SOLUTION INTRAVENOUS CONTINUOUS
Status: ACTIVE | OUTPATIENT
Start: 2018-03-10 | End: 2018-03-10

## 2018-03-10 RX ADMIN — RAMELTEON 8 MG: 8 TABLET, FILM COATED ORAL at 09:03

## 2018-03-10 RX ADMIN — CEFTRIAXONE SODIUM 1 G: 1 INJECTION, POWDER, FOR SOLUTION INTRAMUSCULAR; INTRAVENOUS at 01:03

## 2018-03-10 RX ADMIN — MAGNESIUM OXIDE TAB 400 MG (241.3 MG ELEMENTAL MG) 400 MG: 400 (241.3 MG) TAB at 11:03

## 2018-03-10 RX ADMIN — MAGNESIUM OXIDE TAB 400 MG (241.3 MG ELEMENTAL MG) 400 MG: 400 (241.3 MG) TAB at 09:03

## 2018-03-10 RX ADMIN — GUAIFENESIN 200 MG: 200 SOLUTION ORAL at 05:03

## 2018-03-10 RX ADMIN — PANTOPRAZOLE SODIUM 40 MG: 40 TABLET, DELAYED RELEASE ORAL at 08:03

## 2018-03-10 RX ADMIN — PRAVASTATIN SODIUM 40 MG: 40 TABLET ORAL at 08:03

## 2018-03-10 RX ADMIN — ACETAMINOPHEN 650 MG: 325 TABLET, FILM COATED ORAL at 09:03

## 2018-03-10 RX ADMIN — ENOXAPARIN SODIUM 40 MG: 100 INJECTION SUBCUTANEOUS at 04:03

## 2018-03-10 RX ADMIN — SODIUM CHLORIDE: 0.9 INJECTION, SOLUTION INTRAVENOUS at 08:03

## 2018-03-10 RX ADMIN — BENZONATATE 200 MG: 100 CAPSULE ORAL at 05:03

## 2018-03-10 RX ADMIN — LABETALOL HYDROCHLORIDE 200 MG: 200 TABLET, FILM COATED ORAL at 09:03

## 2018-03-10 RX ADMIN — ACETAMINOPHEN 650 MG: 325 TABLET, FILM COATED ORAL at 05:03

## 2018-03-10 RX ADMIN — GUAIFENESIN 200 MG: 200 SOLUTION ORAL at 09:03

## 2018-03-10 RX ADMIN — ALISKIREN HEMIFUMARATE 300 MG: 300 TABLET, FILM COATED ORAL at 08:03

## 2018-03-10 RX ADMIN — POTASSIUM CHLORIDE 40 MEQ: 20 SOLUTION ORAL at 08:03

## 2018-03-10 RX ADMIN — LABETALOL HCL 150 MG: 100 TABLET, FILM COATED ORAL at 08:03

## 2018-03-10 RX ADMIN — AZITHROMYCIN 500 MG: 250 TABLET, FILM COATED ORAL at 08:03

## 2018-03-10 RX ADMIN — CHLORTHALIDONE 50 MG: 25 TABLET ORAL at 08:03

## 2018-03-10 RX ADMIN — IPRATROPIUM BROMIDE AND ALBUTEROL SULFATE 3 ML: .5; 3 SOLUTION RESPIRATORY (INHALATION) at 08:03

## 2018-03-10 NOTE — SUBJECTIVE & OBJECTIVE
Interval History: No acute issues. Patient reports symptomatic improvement. Now on ceftriaxone and azithromycin. WBC flat and procal improved.    Review of Systems   Constitutional: Positive for appetite change. Negative for chills and fever.   HENT: Negative for postnasal drip, rhinorrhea and sore throat.    Respiratory: Positive for cough. Negative for chest tightness, shortness of breath and wheezing.    Cardiovascular: Positive for chest pain (MSK from cough). Negative for palpitations and leg swelling.   Gastrointestinal: Negative for abdominal pain, diarrhea, nausea and vomiting.   Genitourinary: Negative for dysuria and flank pain.   Musculoskeletal: Negative for arthralgias and myalgias.   Skin: Negative for color change and pallor.   Neurological: Negative for light-headedness and headaches.     Objective:     Vital Signs (Most Recent):  Temp: 97.6 °F (36.4 °C) (03/10/18 1227)  Pulse: 71 (03/10/18 1227)  Resp: 17 (03/10/18 1227)  BP: (!) 163/69 (03/10/18 0726)  SpO2: (!) 94 % (03/10/18 1227) Vital Signs (24h Range):  Temp:  [97.6 °F (36.4 °C)-98.6 °F (37 °C)] 97.6 °F (36.4 °C)  Pulse:  [66-80] 71  Resp:  [16-18] 17  SpO2:  [93 %-97 %] 94 %  BP: (139-164)/(67-73) 163/69     Weight: 64 kg (141 lb 1.5 oz)  Body mass index is 24.99 kg/m².  No intake or output data in the 24 hours ending 03/10/18 1518   Physical Exam   Constitutional: She is oriented to person, place, and time. She appears well-developed and well-nourished. No distress.   HENT:   Head: Normocephalic and atraumatic.   Mouth/Throat: Mucous membranes are dry.   Neck: Normal range of motion. Neck supple. No JVD present.   Cardiovascular: Normal rate, regular rhythm and intact distal pulses.    No murmur heard.  Pulmonary/Chest: Effort normal. No respiratory distress. She has decreased breath sounds in the right lower field. She exhibits tenderness (attributed to cough).   Abdominal: Soft. Bowel sounds are normal. There is no tenderness.    Musculoskeletal: Normal range of motion. She exhibits no edema.   Neurological: She is alert and oriented to person, place, and time. No cranial nerve deficit.   Skin: Skin is warm and dry.       Significant Labs:   BMP:   Recent Labs  Lab 03/10/18  0506      *   K 3.3*   CL 96   CO2 24   BUN 29*   CREATININE 1.0   CALCIUM 8.7   MG 1.5*     CBC:   Recent Labs  Lab 03/09/18  0429 03/10/18  0506   WBC 18.01* 17.77*   HGB 10.1* 9.8*   HCT 29.3* 28.7*    243       Significant Imaging: No new imaging

## 2018-03-10 NOTE — PLAN OF CARE
Problem: Patient Care Overview  Goal: Plan of Care Review  Outcome: Ongoing (interventions implemented as appropriate)  Free from falls/injuires. VSS. Afebrile. No c/o pain. Received q 4 breathing tx. Continued IV antibiotics. Skin intact. In bed with side rails up x 3, wheels locked, bed in lowest position, call light in reach.

## 2018-03-10 NOTE — ASSESSMENT & PLAN NOTE
Has remained hypertensive with SBP reaching >190, asymptomatic. Difficult to control outpatient with episodes of hypotension. Recently discontinued amlodipine 2.5 daily. Restart home regimen.  - chlorthalidone 25 mg daily -> 50 mg daily  - aliskiren 300 mg daily  - labetalol 150 mg BID  - better control with increased chlorthalidone

## 2018-03-10 NOTE — ASSESSMENT & PLAN NOTE
Sodium decreased from 138 -> 128 on 3/10  - Increased chlorthalidone dosage for better BP control. Now with dry mucous membranes and polyuria.  - 1 L NS infusion, repeat BMP pending

## 2018-03-10 NOTE — SUBJECTIVE & OBJECTIVE
Interval History: Finished 5 day course of moxi. No change in WBC. Procal improved. Patient spiked 100.5 fever this AM and feels fatigued, but not hot. Repeat CXR showing patchy infiltrate of right lower lung with small pleural effusion, BNP normal. Switched abx to ceftriaxone and azithromycin.    Review of Systems   Constitutional: Positive for appetite change. Negative for chills and fever.   HENT: Negative for postnasal drip, rhinorrhea and sore throat.    Respiratory: Positive for cough. Negative for chest tightness, shortness of breath and wheezing.    Cardiovascular: Positive for chest pain (MSK from cough). Negative for palpitations and leg swelling.   Gastrointestinal: Negative for abdominal pain, diarrhea, nausea and vomiting.   Genitourinary: Negative for dysuria and flank pain.   Musculoskeletal: Negative for arthralgias and myalgias.   Skin: Negative for color change and pallor.   Neurological: Negative for light-headedness and headaches.     Objective:     Vital Signs (Most Recent):  Temp: 98.1 °F (36.7 °C) (03/09/18 1543)  Pulse: 70 (03/09/18 1543)  Resp: 18 (03/09/18 1543)  BP: (!) 145/67 (03/09/18 1543)  SpO2: 97 % (03/09/18 1543) Vital Signs (24h Range):  Temp:  [98.1 °F (36.7 °C)-100.5 °F (38.1 °C)] 98.1 °F (36.7 °C)  Pulse:  [68-79] 70  Resp:  [14-20] 18  SpO2:  [85 %-97 %] 97 %  BP: (145-169)/(60-72) 145/67     Weight: 64 kg (141 lb 1.5 oz)  Body mass index is 24.99 kg/m².    Intake/Output Summary (Last 24 hours) at 03/09/18 1825  Last data filed at 03/09/18 0900   Gross per 24 hour   Intake              240 ml   Output                0 ml   Net              240 ml      Physical Exam   Constitutional: She is oriented to person, place, and time. She appears well-developed and well-nourished. No distress.   HENT:   Head: Normocephalic and atraumatic.   Mouth/Throat: Mucous membranes are dry.   Neck: Normal range of motion. Neck supple. No JVD present.   Cardiovascular: Normal rate, regular rhythm  and intact distal pulses.    No murmur heard.  Pulmonary/Chest: Effort normal. No respiratory distress. She has decreased breath sounds in the right lower field. She exhibits tenderness (attributed to cough).   Abdominal: Soft. Bowel sounds are normal. There is no tenderness.   Musculoskeletal: Normal range of motion. She exhibits no edema.   Neurological: She is alert and oriented to person, place, and time. No cranial nerve deficit.   Skin: Skin is warm and dry.       Significant Labs:   BMP:   Recent Labs  Lab 03/09/18  0429   GLU 98      K 3.5      CO2 24   BUN 28*   CREATININE 0.8   CALCIUM 9.1   MG 1.6     CBC:   Recent Labs  Lab 03/08/18  0536 03/09/18  0429   WBC 17.59* 18.01*   HGB 10.2* 10.1*   HCT 30.2* 29.3*    240       Significant Imaging: CXR: I have reviewed all pertinent results/findings within the past 24 hours and my personal findings are:  Patchy infiltrate suspected in the right lung with small pleural effusion

## 2018-03-10 NOTE — ASSESSMENT & PLAN NOTE
Productive cough with elevated WBC and procal on admit. CXR equivocal. Flu negative. Treating as CAP. Repeat CXR on 3/9 showing patchy infiltrate of right lower lobe.  - No respiratory distress. Patient saturating adequately on RA  - WBC decreasing, but slowly, may need to prolong antibiotic course.  Procal decreasing  - Finished 5 day course of avelox. Started on ceftriaxone and azithromycin on 3/9

## 2018-03-10 NOTE — PROGRESS NOTES
[facility] Hospital Medicine  Progress Note    Patient Name: Gerda Lai  MRN: [id number]  Patient Class: IP- Inpatient   Admission Date: 3/5/2018  Length of Stay: 4 days  Attending Physician: Josette Ignacio MD  Primary Care Provider: Demario Sellers MD    [facility] Medicine Team: [facility] [place] HOSP MED 4 Kj Montano MD    Subjective:     Principal Problem:Community acquired pneumonia    HPI:  Gerda Lai is a 85-year-old female with HTN and distant history of breast cancer (lumpectomy/radiation) presenting with productive cough and chills. Patient reports subjective chills without fever over the past several days. She subsequently developed a productive cough of green sputum on Saturday night that progressively worsened over Sunday. She went to an urgent care appointment on Sunday and was released with a diagnosis of URI with a prescription for tessalon perles and robitussin dm. Her symptoms did not improve today which prompted her visit to the ED. On interview, patient reports some SOB associated with coughing spells but has no respiratory distress at baseline. She has had no sick contacts that she is aware of. She is a nonsmoker with no respiratory history. She denies HA, fever, N/V, abdo pain, diarrhea, or dysuria.    In the ED, she was HDS in no respiratory distress saturating adequately on room air. Her work-up was significant for WBC 16.9, procal 0.47, and with a normal UA. CXR did not note consolidation or infiltrates. TBili 2.1 evaluated with ultrasound which was unremarkable. She was given ceftriaxone 1 g IV x1 and doxycycline 100 mg PO x1 for CAP.    Hospital Course:  3/6: Subjective improvement today. WBC and procal slightly increased and spiked low-grade fever 100.5 overnight. Continue moxifloxacin  3/7: WBC flat. Procal improved. Patient feels subjectively better. Will continue moxifloxacin.  3/9: Finished 5 day course of moxi. No change in WBC. Procal improved. Patient spiked  100.5 fever this AM and feels fatigued. Repeat CXR showing patchy infiltrate of right lower lung with small pleural effusion, BNP normal. Switched abx to ceftriaxone and azithromycin.    Interval History: Finished 5 day course of moxi. No change in WBC. Procal improved. Patient spiked 100.5 fever this AM and feels fatigued, but not hot. Repeat CXR showing patchy infiltrate of right lower lung with small pleural effusion, BNP normal. Switched abx to ceftriaxone and azithromycin.    Review of Systems   Constitutional: Positive for appetite change. Negative for chills and fever.   HENT: Negative for postnasal drip, rhinorrhea and sore throat.    Respiratory: Positive for cough. Negative for chest tightness, shortness of breath and wheezing.    Cardiovascular: Positive for chest pain (MSK from cough). Negative for palpitations and leg swelling.   Gastrointestinal: Negative for abdominal pain, diarrhea, nausea and vomiting.   Genitourinary: Negative for dysuria and flank pain.   Musculoskeletal: Negative for arthralgias and myalgias.   Skin: Negative for color change and pallor.   Neurological: Negative for light-headedness and headaches.     Objective:     Vital Signs (Most Recent):  Temp: 98.1 °F (36.7 °C) (03/09/18 1543)  Pulse: 70 (03/09/18 1543)  Resp: 18 (03/09/18 1543)  BP: (!) 145/67 (03/09/18 1543)  SpO2: 97 % (03/09/18 1543) Vital Signs (24h Range):  Temp:  [98.1 °F (36.7 °C)-100.5 °F (38.1 °C)] 98.1 °F (36.7 °C)  Pulse:  [68-79] 70  Resp:  [14-20] 18  SpO2:  [85 %-97 %] 97 %  BP: (145-169)/(60-72) 145/67     Weight: 64 kg (141 lb 1.5 oz)  Body mass index is 24.99 kg/m².    Intake/Output Summary (Last 24 hours) at 03/09/18 9413  Last data filed at 03/09/18 0900   Gross per 24 hour   Intake              240 ml   Output                0 ml   Net              240 ml      Physical Exam   Constitutional: She is oriented to person, place, and time. She appears well-developed and well-nourished. No distress.   HENT:    Head: Normocephalic and atraumatic.   Mouth/Throat: Mucous membranes are dry.   Neck: Normal range of motion. Neck supple. No JVD present.   Cardiovascular: Normal rate, regular rhythm and intact distal pulses.    No murmur heard.  Pulmonary/Chest: Effort normal. No respiratory distress. She has decreased breath sounds in the right lower field. She exhibits tenderness (attributed to cough).   Abdominal: Soft. Bowel sounds are normal. There is no tenderness.   Musculoskeletal: Normal range of motion. She exhibits no edema.   Neurological: She is alert and oriented to person, place, and time. No cranial nerve deficit.   Skin: Skin is warm and dry.       Significant Labs:   BMP:   Recent Labs  Lab 03/09/18  0429   GLU 98      K 3.5      CO2 24   BUN 28*   CREATININE 0.8   CALCIUM 9.1   MG 1.6     CBC:   Recent Labs  Lab 03/08/18  0536 03/09/18  0429   WBC 17.59* 18.01*   HGB 10.2* 10.1*   HCT 30.2* 29.3*    240       Significant Imaging: CXR: I have reviewed all pertinent results/findings within the past 24 hours and my personal findings are:  Patchy infiltrate suspected in the right lung with small pleural effusion    Assessment/Plan:      * Community acquired pneumonia    Productive cough with elevated WBC and procal on admit. CXR equivocal. Flu negative. Treating as CAP. Repeat CXR on 3/9 showing patchy infiltrate of right lower lobe.  - No respiratory distress. Patient saturating adequately on RA  - WBC decreasing, but slowly, may need to prolong antibiotic course.  Procal decreasing  - Finished 5 day course of avelox. Started on ceftriaxone and azithromycin on 3/9            Hypertension, essential    Has remained hypertensive with SBP reaching >190, asymptomatic. Difficult to control outpatient with episodes of hypotension. Recently discontinued amlodipine 2.5 daily. Restart home regimen.  - chlorthalidone 25 mg daily -> 50 mg daily  - aliskiren 300 mg daily  - labetalol 150 mg BID  - better  control with increased chlorthalidone          VTE Risk Mitigation         Ordered     enoxaparin injection 40 mg  Daily     Route:  Subcutaneous        03/05/18 1818     Medium Risk of VTE  Once      03/05/18 1818              Kj Montano MD  Department of Hospital Medicine   [facility]

## 2018-03-10 NOTE — ASSESSMENT & PLAN NOTE
Has remained hypertensive with SBP reaching >190, asymptomatic. Difficult to control outpatient with episodes of hypotension. Recently discontinued amlodipine 2.5 daily. Restart home regimen.  - chlorthalidone 25 mg daily   - aliskiren 300 mg daily  - labetalol 150 mg BID -> 200 mg BID  - back on home chlorthalidone and increased labetalol in the setting of diuretic hyponatremia

## 2018-03-10 NOTE — PROGRESS NOTES
[facility] Hospital Medicine  Progress Note    Patient Name: Gerda Lai  MRN: [id number]  Patient Class: IP- Inpatient   Admission Date: 3/5/2018  Length of Stay: 5 days  Attending Physician: Josette Ignacio MD  Primary Care Provider: Demario Sellers MD    [facility] Medicine Team: [facility]OSP MED 4 Kj Montano MD    Subjective:     Principal Problem:Community acquired pneumonia    HPI:  Gerda Lai is a 85-year-old female with HTN and distant history of breast cancer (lumpectomy/radiation) presenting with productive cough and chills. Patient reports subjective chills without fever over the past several days. She subsequently developed a productive cough of green sputum on Saturday night that progressively worsened over Sunday. She went to an urgent care appointment on Sunday and was released with a diagnosis of URI with a prescription for tessalon perles and robitussin dm. Her symptoms did not improve today which prompted her visit to the ED. On interview, patient reports some SOB associated with coughing spells but has no respiratory distress at baseline. She has had no sick contacts that she is aware of. She is a nonsmoker with no respiratory history. She denies HA, fever, N/V, abdo pain, diarrhea, or dysuria.    In the ED, she was HDS in no respiratory distress saturating adequately on room air. Her work-up was significant for WBC 16.9, procal 0.47, and with a normal UA. CXR did not note consolidation or infiltrates. TBili 2.1 evaluated with ultrasound which was unremarkable. She was given ceftriaxone 1 g IV x1 and doxycycline 100 mg PO x1 for CAP.    Hospital Course:  3/6: Subjective improvement today. WBC and procal slightly increased and spiked low-grade fever 100.5 overnight. Continue moxifloxacin  3/7: WBC flat. Procal improved. Patient feels subjectively better. Will continue moxifloxacin.  3/9: Finished 5 day course of moxi. No change in WBC. Procal improved. Patient spiked  100.5 fever this AM and feels fatigued. Repeat CXR showing patchy infiltrate of right lower lung with small pleural effusion, BNP normal. Switched abx to ceftriaxone and azithromycin.  3/10: Subjective improvement. New hyponatremia in the setting of increased chlorthalidone dosing. Increased BB and decreased thiazide.    Interval History: No acute issues. Patient reports symptomatic improvement. Now on ceftriaxone and azithromycin. WBC flat and procal improved.    Review of Systems   Constitutional: Positive for appetite change. Negative for chills and fever.   HENT: Negative for postnasal drip, rhinorrhea and sore throat.    Respiratory: Positive for cough. Negative for chest tightness, shortness of breath and wheezing.    Cardiovascular: Positive for chest pain (MSK from cough). Negative for palpitations and leg swelling.   Gastrointestinal: Negative for abdominal pain, diarrhea, nausea and vomiting.   Genitourinary: Negative for dysuria and flank pain.   Musculoskeletal: Negative for arthralgias and myalgias.   Skin: Negative for color change and pallor.   Neurological: Negative for light-headedness and headaches.     Objective:     Vital Signs (Most Recent):  Temp: 97.6 °F (36.4 °C) (03/10/18 1227)  Pulse: 71 (03/10/18 1227)  Resp: 17 (03/10/18 1227)  BP: (!) 163/69 (03/10/18 0726)  SpO2: (!) 94 % (03/10/18 1227) Vital Signs (24h Range):  Temp:  [97.6 °F (36.4 °C)-98.6 °F (37 °C)] 97.6 °F (36.4 °C)  Pulse:  [66-80] 71  Resp:  [16-18] 17  SpO2:  [93 %-97 %] 94 %  BP: (139-164)/(67-73) 163/69     Weight: 64 kg (141 lb 1.5 oz)  Body mass index is 24.99 kg/m².  No intake or output data in the 24 hours ending 03/10/18 1518   Physical Exam   Constitutional: She is oriented to person, place, and time. She appears well-developed and well-nourished. No distress.   HENT:   Head: Normocephalic and atraumatic.   Mouth/Throat: Mucous membranes are dry.   Neck: Normal range of motion. Neck supple. No JVD present.    Cardiovascular: Normal rate, regular rhythm and intact distal pulses.    No murmur heard.  Pulmonary/Chest: Effort normal. No respiratory distress. She has decreased breath sounds in the right lower field. She exhibits tenderness (attributed to cough).   Abdominal: Soft. Bowel sounds are normal. There is no tenderness.   Musculoskeletal: Normal range of motion. She exhibits no edema.   Neurological: She is alert and oriented to person, place, and time. No cranial nerve deficit.   Skin: Skin is warm and dry.       Significant Labs:   BMP:   Recent Labs  Lab 03/10/18  0506      *   K 3.3*   CL 96   CO2 24   BUN 29*   CREATININE 1.0   CALCIUM 8.7   MG 1.5*     CBC:   Recent Labs  Lab 03/09/18  0429 03/10/18  0506   WBC 18.01* 17.77*   HGB 10.1* 9.8*   HCT 29.3* 28.7*    243       Significant Imaging: No new imaging    Assessment/Plan:      * Community acquired pneumonia    Productive cough with elevated WBC and procal on admit. CXR equivocal. Flu negative. Treating as CAP. Repeat CXR on 3/9 showing patchy infiltrate of right lower lobe.  - No respiratory distress. Patient saturating adequately on RA  - WBC decreasing, but slowly, may need to prolong antibiotic course.  Procal decreasing  - Finished 5 day course of avelox. Started on ceftriaxone and azithromycin on 3/9            Hypertension, essential    Has remained hypertensive with SBP reaching >190, asymptomatic. Difficult to control outpatient with episodes of hypotension. Recently discontinued amlodipine 2.5 daily. Restart home regimen.  - chlorthalidone 25 mg daily   - aliskiren 300 mg daily  - labetalol 150 mg BID -> 200 mg BID  - back on home chlorthalidone and increased labetalol in the setting of diuretic hyponatremia        Hyponatremia    Sodium decreased from 138 -> 128 on 3/10  - Increased chlorthalidone dosage for better BP control. Now with dry mucous membranes and polyuria.  - 1 L NS infusion, repeat BMP pending            VTE  Risk Mitigation         Ordered     enoxaparin injection 40 mg  Daily     Route:  Subcutaneous        03/05/18 1818     Medium Risk of VTE  Once      03/05/18 1818              Kj Montano MD  Department of Hospital Medicine   [facility]

## 2018-03-11 VITALS
HEART RATE: 66 BPM | HEIGHT: 63 IN | BODY MASS INDEX: 25.01 KG/M2 | OXYGEN SATURATION: 95 % | RESPIRATION RATE: 16 BRPM | TEMPERATURE: 98 F | DIASTOLIC BLOOD PRESSURE: 72 MMHG | WEIGHT: 141.13 LBS | SYSTOLIC BLOOD PRESSURE: 150 MMHG

## 2018-03-11 PROBLEM — J18.9 COMMUNITY ACQUIRED PNEUMONIA: Status: RESOLVED | Noted: 2018-03-05 | Resolved: 2018-03-11

## 2018-03-11 PROBLEM — E87.1 HYPONATREMIA: Status: RESOLVED | Noted: 2018-03-10 | Resolved: 2018-03-11

## 2018-03-11 LAB
ANION GAP SERPL CALC-SCNC: 12 MMOL/L
ANISOCYTOSIS BLD QL SMEAR: SLIGHT
BASOPHILS # BLD AUTO: ABNORMAL K/UL
BASOPHILS NFR BLD: 0 %
BUN SERPL-MCNC: 25 MG/DL
CALCIUM SERPL-MCNC: 9 MG/DL
CHLORIDE SERPL-SCNC: 106 MMOL/L
CO2 SERPL-SCNC: 20 MMOL/L
CREAT SERPL-MCNC: 1 MG/DL
DIFFERENTIAL METHOD: ABNORMAL
EOSINOPHIL # BLD AUTO: ABNORMAL K/UL
EOSINOPHIL NFR BLD: 2 %
ERYTHROCYTE [DISTWIDTH] IN BLOOD BY AUTOMATED COUNT: 11.9 %
EST. GFR  (AFRICAN AMERICAN): 59.4 ML/MIN/1.73 M^2
EST. GFR  (NON AFRICAN AMERICAN): 51.5 ML/MIN/1.73 M^2
GLUCOSE SERPL-MCNC: 109 MG/DL
HCT VFR BLD AUTO: 28.7 %
HGB BLD-MCNC: 9.6 G/DL
IMM GRANULOCYTES # BLD AUTO: ABNORMAL K/UL
IMM GRANULOCYTES NFR BLD AUTO: ABNORMAL %
LYMPHOCYTES # BLD AUTO: ABNORMAL K/UL
LYMPHOCYTES NFR BLD: 11 %
MAGNESIUM SERPL-MCNC: 1.5 MG/DL
MCH RBC QN AUTO: 33.7 PG
MCHC RBC AUTO-ENTMCNC: 33.4 G/DL
MCV RBC AUTO: 101 FL
MONOCYTES # BLD AUTO: ABNORMAL K/UL
MONOCYTES NFR BLD: 6 %
NEUTROPHILS NFR BLD: 81 %
NRBC BLD-RTO: 0 /100 WBC
PHOSPHATE SERPL-MCNC: 3.7 MG/DL
PLATELET # BLD AUTO: 246 K/UL
PLATELET BLD QL SMEAR: ABNORMAL
PMV BLD AUTO: 8.9 FL
POLYCHROMASIA BLD QL SMEAR: ABNORMAL
POTASSIUM SERPL-SCNC: 4 MMOL/L
PROCALCITONIN SERPL IA-MCNC: 0.19 NG/ML
RBC # BLD AUTO: 2.85 M/UL
SODIUM SERPL-SCNC: 138 MMOL/L
WBC # BLD AUTO: 12.65 K/UL

## 2018-03-11 PROCEDURE — 25000003 PHARM REV CODE 250

## 2018-03-11 PROCEDURE — 99239 HOSP IP/OBS DSCHRG MGMT >30: CPT | Mod: ,,, | Performed by: HOSPITALIST

## 2018-03-11 PROCEDURE — 94640 AIRWAY INHALATION TREATMENT: CPT

## 2018-03-11 PROCEDURE — 99900035 HC TECH TIME PER 15 MIN (STAT)

## 2018-03-11 PROCEDURE — 85007 BL SMEAR W/DIFF WBC COUNT: CPT

## 2018-03-11 PROCEDURE — 94668 MNPJ CHEST WALL SBSQ: CPT

## 2018-03-11 PROCEDURE — 83735 ASSAY OF MAGNESIUM: CPT

## 2018-03-11 PROCEDURE — 94761 N-INVAS EAR/PLS OXIMETRY MLT: CPT

## 2018-03-11 PROCEDURE — 25000003 PHARM REV CODE 250: Performed by: STUDENT IN AN ORGANIZED HEALTH CARE EDUCATION/TRAINING PROGRAM

## 2018-03-11 PROCEDURE — 80048 BASIC METABOLIC PNL TOTAL CA: CPT

## 2018-03-11 PROCEDURE — 36415 COLL VENOUS BLD VENIPUNCTURE: CPT

## 2018-03-11 PROCEDURE — 84145 PROCALCITONIN (PCT): CPT

## 2018-03-11 PROCEDURE — 85027 COMPLETE CBC AUTOMATED: CPT

## 2018-03-11 PROCEDURE — 25000242 PHARM REV CODE 250 ALT 637 W/ HCPCS: Performed by: HOSPITALIST

## 2018-03-11 PROCEDURE — 94664 DEMO&/EVAL PT USE INHALER: CPT

## 2018-03-11 PROCEDURE — 84100 ASSAY OF PHOSPHORUS: CPT

## 2018-03-11 RX ORDER — AZITHROMYCIN 500 MG/1
500 TABLET, FILM COATED ORAL DAILY
Qty: 2 TABLET | Refills: 0 | Status: SHIPPED | OUTPATIENT
Start: 2018-03-12 | End: 2018-03-13

## 2018-03-11 RX ORDER — BENZONATATE 200 MG/1
200 CAPSULE ORAL 3 TIMES DAILY PRN
Qty: 30 CAPSULE | Refills: 0 | Status: SHIPPED | OUTPATIENT
Start: 2018-03-11 | End: 2018-03-13

## 2018-03-11 RX ORDER — GUAIFENESIN/DEXTROMETHORPHAN 100-10MG/5
20 SYRUP ORAL 4 TIMES DAILY
Qty: 118 ML | Refills: 0 | COMMUNITY
Start: 2018-03-11 | End: 2018-03-21

## 2018-03-11 RX ADMIN — MAGNESIUM OXIDE TAB 400 MG (241.3 MG ELEMENTAL MG) 400 MG: 400 (241.3 MG) TAB at 08:03

## 2018-03-11 RX ADMIN — IPRATROPIUM BROMIDE AND ALBUTEROL SULFATE 3 ML: .5; 3 SOLUTION RESPIRATORY (INHALATION) at 08:03

## 2018-03-11 RX ADMIN — PRAVASTATIN SODIUM 40 MG: 40 TABLET ORAL at 08:03

## 2018-03-11 RX ADMIN — AZITHROMYCIN 500 MG: 250 TABLET, FILM COATED ORAL at 08:03

## 2018-03-11 RX ADMIN — CHLORTHALIDONE 25 MG: 25 TABLET ORAL at 08:03

## 2018-03-11 RX ADMIN — PANTOPRAZOLE SODIUM 40 MG: 40 TABLET, DELAYED RELEASE ORAL at 08:03

## 2018-03-11 RX ADMIN — ALISKIREN HEMIFUMARATE 300 MG: 300 TABLET, FILM COATED ORAL at 08:03

## 2018-03-11 RX ADMIN — LABETALOL HYDROCHLORIDE 200 MG: 200 TABLET, FILM COATED ORAL at 08:03

## 2018-03-11 NOTE — PLAN OF CARE
Problem: Patient Care Overview  Goal: Plan of Care Review  Outcome: Ongoing (interventions implemented as appropriate)  Patient AAOx4. Patient's vitals remain stable. Patient remains free from falls/injury throughout shift. No complaints of pain this shift. Will continue to monitor.

## 2018-03-11 NOTE — NURSING
Discharge instructions reviewed with and given to patient. Patient verbalized understanding. Patients IV removed free of complications. Patient awaiting transportation. Will continue to monitor.

## 2018-03-11 NOTE — ASSESSMENT & PLAN NOTE
Has remained hypertensive with SBP reaching >190, asymptomatic. Difficult to control outpatient with episodes of hypotension. Recently discontinued amlodipine 2.5 daily. Restart home regimen.  - chlorthalidone 25 mg daily   - aliskiren 300 mg daily  - labetalol 150 mg BID -> 200 mg BID  - back on home chlorthalidone and increased labetalol in the setting of diuretic hyponatremia  - Discharged on home antihypertensives

## 2018-03-11 NOTE — ASSESSMENT & PLAN NOTE
Productive cough with elevated WBC and procal on admit. CXR equivocal. Flu negative. Treating as CAP. Repeat CXR on 3/9 showing patchy infiltrate of right lower lobe.  - No respiratory distress. Patient saturating adequately on RA  - WBC decreasing, but slowly, may need to prolong antibiotic course.  Procal decreasing  - Finished 5 day course of avelox. Started on ceftriaxone and azithromycin on 3/9  - Completed 3 of 5 days of azithromycin while inpatient

## 2018-03-11 NOTE — DISCHARGE SUMMARY
[facility] Hospital Medicine  Discharge Summary      Patient Name: Gerda Lai  MRN: [id number]  Admission Date: 3/5/2018  Hospital Length of Stay: 6 days  Discharge Date and Time:  03/11/2018 3:50 PM  Attending Physician: No att. providers found   Discharging Provider: Kj Montano MD  Primary Care Provider: Demario Sellers MD  [facility] Medicine Team: [facility] [place] HOSP MED 4 Kj Montano MD    HPI:   Gerda Lai is a 85-year-old female with HTN and distant history of breast cancer (lumpectomy/radiation) presenting with productive cough and chills. Patient reports subjective chills without fever over the past several days. She subsequently developed a productive cough of green sputum on Saturday night that progressively worsened over Sunday. She went to an urgent care appointment on Sunday and was released with a diagnosis of URI with a prescription for tessalon perles and robitussin dm. Her symptoms did not improve today which prompted her visit to the ED. On interview, patient reports some SOB associated with coughing spells but has no respiratory distress at baseline. She has had no sick contacts that she is aware of. She is a nonsmoker with no respiratory history. She denies HA, fever, N/V, abdo pain, diarrhea, or dysuria.    In the ED, she was HDS in no respiratory distress saturating adequately on room air. Her work-up was significant for WBC 16.9, procal 0.47, and with a normal UA. CXR did not note consolidation or infiltrates. TBili 2.1 evaluated with ultrasound which was unremarkable. She was given ceftriaxone 1 g IV x1 and doxycycline 100 mg PO x1 for CAP.    * No surgery found *      Hospital Course:   3/6: Subjective improvement today. WBC and procal slightly increased and spiked low-grade fever 100.5 overnight. Continue moxifloxacin  3/7: WBC flat. Procal improved. Patient feels subjectively better. Will continue moxifloxacin.  3/9: Finished 5 day course of moxi. No change in  WBC. Procal improved. Patient spiked 100.5 fever this AM and feels fatigued. Repeat CXR showing patchy infiltrate of right lower lung with small pleural effusion, BNP normal. Switched abx to ceftriaxone and azithromycin.  3/10: Subjective improvement. New hyponatremia in the setting of increased chlorthalidone dosing. Increased BB and decreased thiazide.  3/11: WBC with substantial improvement today. Likely atypical pneumonia that has responded well to azithromycin. Hyponatremia resolved. Patient discharged with 2 days left of 5 day course of azithromycin for CAP. Patient evaluated by PT/OT while inpatient and recommended outpatient rehab if desired. During her stay, she was able to ambulate around the room without difficulty and not significantly deconditioned. Family is able to support her while she recovers from this episode.     Review of Systems   Constitutional: Positive for appetite change. Negative for chills and fever.   HENT: Negative for postnasal drip, rhinorrhea and sore throat.    Respiratory: Positive for cough. Negative for chest tightness, shortness of breath and wheezing.    Cardiovascular: Positive for chest pain (MSK from cough). Negative for palpitations and leg swelling.   Gastrointestinal: Negative for abdominal pain, diarrhea, nausea and vomiting.   Genitourinary: Negative for dysuria and flank pain.   Musculoskeletal: Negative for arthralgias and myalgias.   Skin: Negative for color change and pallor.   Neurological: Negative for light-headedness and headaches.     Vital Signs (Most Recent):  Temp: 98.1 °F (36.7 °C) (03/11/18 1235)  Pulse: 66 (03/11/18 1235)  Resp: 16 (03/11/18 1235)  BP: (!) 150/72 (03/11/18 1235)  SpO2: 95 % (03/11/18 1235) Vital Signs (24h Range):  Temp:  [98.1 °F (36.7 °C)-98.9 °F (37.2 °C)] 98.1 °F (36.7 °C)  Pulse:  [63-79] 66  Resp:  [16-20] 16  SpO2:  [93 %-98 %] 95 %  BP: (150-178)/(55-78) 150/72     Weight: 64 kg (141 lb 1.5 oz)  Body mass index is 24.99 kg/m².  No  intake or output data in the 24 hours ending 03/11/18 1550   Physical Exam   Constitutional: She is oriented to person, place, and time. She appears well-developed and well-nourished. No distress.   HENT:   Head: Normocephalic and atraumatic.   Mouth/Throat: Mucous membranes are dry.   Neck: Normal range of motion. Neck supple. No JVD present.   Cardiovascular: Normal rate, regular rhythm and intact distal pulses.    No murmur heard.  Pulmonary/Chest: Effort normal. No respiratory distress. She has decreased breath sounds in the right lower field. She exhibits tenderness (attributed to cough).   Abdominal: Soft. Bowel sounds are normal. There is no tenderness.   Musculoskeletal: Normal range of motion. She exhibits no edema.   Neurological: She is alert and oriented to person, place, and time. No cranial nerve deficit.   Skin: Skin is warm and dry.     Consults:     * Community acquired pneumonia    Productive cough with elevated WBC and procal on admit. CXR equivocal. Flu negative. Treating as CAP. Repeat CXR on 3/9 showing patchy infiltrate of right lower lobe.  - No respiratory distress. Patient saturating adequately on RA  - WBC decreasing, but slowly, may need to prolong antibiotic course.  Procal decreasing  - Finished 5 day course of avelox. Started on ceftriaxone and azithromycin on 3/9  - Completed 3 of 5 days of azithromycin while inpatient            Hypertension, essential    Has remained hypertensive with SBP reaching >190, asymptomatic. Difficult to control outpatient with episodes of hypotension. Recently discontinued amlodipine 2.5 daily. Restart home regimen.  - chlorthalidone 25 mg daily   - aliskiren 300 mg daily  - labetalol 150 mg BID -> 200 mg BID  - back on home chlorthalidone and increased labetalol in the setting of diuretic hyponatremia  - Discharged on home antihypertensives        Hyponatremia    Sodium decreased from 138 -> 128 on 3/10  - Increased chlorthalidone dosage for better BP  control. Now with dry mucous membranes and polyuria.  - 1 L NS infusion, repeat BMP wnl  - resolved            Final Active Diagnoses:    Diagnosis Date Noted POA    PRINCIPAL PROBLEM:  Community acquired pneumonia [J18.9] 03/05/2018 Yes    Hypertension, essential [I10] 07/17/2012 Yes    Hyponatremia [E87.1] 03/10/2018 Unknown      Problems Resolved During this Admission:    Diagnosis Date Noted Date Resolved POA       Discharged Condition: good    Disposition: Home or Self Care    Follow Up:  Follow-up Information     Demario Sellers MD On 3/14/2018.    Specialty:  Family Medicine  Why:  Hospital Follow-up Wednesday 3/14 @ 10am  Contact information:  [address] [facility]  [address]  [facility] [address]  [phone]                 Patient Instructions:     Ambulatory referral to Outpatient Case Management   Referral Priority: Routine Referral Type: Consultation   Referral Reason: Specialty Services Required    Number of Visits Requested: 1      Activity as tolerated         Significant Diagnostic Studies: Labs:   BMP:   Recent Labs  Lab 03/10/18  0506 03/10/18  1345 03/11/18  0559    108 109   * 139 138   K 3.3* 4.3 4.0   CL 96 106 106   CO2 24 22* 20*   BUN 29* 25* 25*   CREATININE 1.0 1.0 1.0   CALCIUM 8.7 8.8 9.0   MG 1.5*  --  1.5*    and CBC   Recent Labs  Lab 03/10/18  0506 03/11/18  0559   WBC 17.77* 12.65   HGB 9.8* 9.6*   HCT 28.7* 28.7*    246       Pending Diagnostic Studies:     None         Medications:  Reconciled Home Medications:   Discharge Medication List as of 3/11/2018  1:40 PM      START taking these medications    Details   azithromycin (ZITHROMAX) 500 MG tablet Take 1 tablet (500 mg total) by mouth once daily., Starting Mon 3/12/2018, Until Wed 3/14/2018, Normal      dextromethorphan-guaifenesin  mg/5 ml (ROBITUSSIN-DM)  mg/5 mL liquid Take 20 mLs by mouth 4 (four) times daily., Starting Sun 3/11/2018, Until Wed 3/21/2018, OTC         CONTINUE these  medications which have CHANGED    Details   benzonatate (TESSALON) 200 MG capsule Take 1 capsule (200 mg total) by mouth 3 (three) times daily as needed for Cough., Starting Sun 3/11/2018, Until Wed 3/21/2018, Normal         CONTINUE these medications which have NOT CHANGED    Details   acetaminophen (TYLENOL) 650 MG TbSR Take 650 mg by mouth as needed (pain). , Historical Med      ascorbic acid (VITAMIN C) 100 MG tablet Take 100 mg by mouth 2 (two) times daily. , Historical Med      B INFANTIS/B ANI/B JOSE/B BIFID (PROBIOTIC 4X ORAL) Take 1 tablet by mouth daily, Until Discontinued, Historical Med      chlorthalidone (HYGROTEN) 25 MG Tab Take 25 mg by mouth once daily. , Starting Mon 10/24/2016, Historical Med      coenzyme Q10 (CO Q-10) 100 mg capsule Take 100 mg by mouth once daily., Until Discontinued, Historical Med      glucosamine-chondroitin 500-400 mg tablet Take 1 tablet by mouth once daily. , Historical Med      labetalol (NORMODYNE) 100 MG tablet TAKE 1 AND 1/2 TABLETS BY MOUTH TWICE A DAY, Normal      LORazepam (ATIVAN) 0.5 MG tablet TAKE 1/2 TABLET BY MOUTH TWICE A DAY, Print      multivitamin capsule Take 1 capsule by mouth once daily., Until Discontinued, Historical Med      omeprazole (PRILOSEC OTC) 20 MG tablet Take 20 mg by mouth once daily.  , Until Discontinued, Historical Med      pravastatin (PRAVACHOL) 40 MG tablet TAKE 1 TABLET (40 MG TOTAL) BY MOUTH ONCE DAILY., Normal      TEKTURNA 300 mg Tab Take 300 mg by mouth once daily. , Starting Mon 2/26/2018, Historical Med             Indwelling Lines/Drains at time of discharge:   Lines/Drains/Airways          No matching active lines, drains, or airways          Time spent on the discharge of patient: 45 minutes  Patient was seen and examined on the date of discharge and determined to be suitable for discharge.         Kj Montano MD  Department of Hospital Medicine  [facility]

## 2018-03-11 NOTE — ASSESSMENT & PLAN NOTE
Sodium decreased from 138 -> 128 on 3/10  - Increased chlorthalidone dosage for better BP control. Now with dry mucous membranes and polyuria.  - 1 L NS infusion, repeat BMP wnl  - resolved

## 2018-03-12 ENCOUNTER — OUTPATIENT CASE MANAGEMENT (OUTPATIENT)
Dept: ADMINISTRATIVE | Facility: OTHER | Age: 83
End: 2018-03-12

## 2018-03-13 ENCOUNTER — PATIENT OUTREACH (OUTPATIENT)
Dept: ADMINISTRATIVE | Facility: CLINIC | Age: 83
End: 2018-03-13

## 2018-03-13 ENCOUNTER — OUTPATIENT CASE MANAGEMENT (OUTPATIENT)
Dept: ADMINISTRATIVE | Facility: OTHER | Age: 83
End: 2018-03-13

## 2018-03-13 NOTE — LETTER
March 13, 2018    Gerda Lai  [address] [place] [address]             [facility] [address] [place] [address] Dear Ms. Lai:      Thank you for taking my call and discussing case management with me.  We understand that receiving many services from different doctors and healthcare providers is overwhelming. There are appointments to make, transportation to arrange, dietary instructions to understand, and new medications to obtain.    This is where I can help. This best thing about this service is there is no charge for this support.     We can do this over the phone as to not conflict with any other appointments you have scheduled with home health and such.       Our goal is to help you manage your health condition(s) safely within your living environment, whether that is your home or a medical facility. We want to help you function at the healthiest and highest level possible.   I have enclosed some information on Pneumonia for your review.    I have enclosed my contact information and information on OPCM services.  I am available Monday through Wednesday 8- 4:30 pm.   If you have any questions or concerns, please don't hesitate to call me at [phone] or the office at [phone].    I will be contacting you periodically for the next 2 months to assist with your care needs.    Sincerely,        Kristie Mancuso RN,[facility]  Outpatient Complex Case Management  [phone]

## 2018-03-13 NOTE — PATIENT INSTRUCTIONS
Preventing Pneumonia  Pneumonia is an infection in one or both of the lungs. Those most at risk include older adults, smokers, and people with chronic lung diseases. For example, those with conditions like chronic obstructive pulmonary disease (COPD), including emphysema or chronic bronchitis, asthma, and those with weak immune systems. There are some things you can do to lessen the chance that you will get pneumonia.    Avoid infection  · Wash your hands often:  ¨ Use soap and warm water.  ¨ If soap and water aren't available, use a hand  with alcohol in it.  · Avoid touching your face and mouth with your hands.  · Use disposable tissues instead of a handkerchief. Throw used tissues away.  · Avoid people who have a cold or the flu.  · Try to avoid crowded places.  Get vaccinated  Talk with your healthcare provider about vaccinations and whether you should get a yearly flu shot. There are now 2 different pneumonia vaccines. Both of these are needed if you have a chronic disease or fit into the higher risk category.    · Get a flu shot every year as soon as it's available in your area. The flu shot helps prevent you from getting the flu and complications of the flu, such as pneumonia.  · Get pneumococcal pneumonia vaccines. Talk with your healthcare provider about which pneumococcal vaccines are right for you.  Do suggested breathing exercises  Deep breathing and coughing exercises can help clear your lungs. Your healthcare provider may suggest them. If so, you will be shown how to do them. Do them as often as your healthcare provider instructs.  Take care of your body  · Drink at least 6 to 8 glasses of water a day.  · Eat well-balanced, healthy meals.  · Avoid drinking alcohol.  · Dont smoke. Avoid places where people are smoking.  · Moving around helps keep your lungs clear. Ask your healthcare provider what type of activity is best for you. Walking is often a good choice.  · Get enough rest. Sleep at  least 8 hours each night. Rest or nap during the day as needed.  · Ask your healthcare provider when you should schedule follow-up care to make sure the infection is gone.  Date Last Reviewed: 12/1/2016 © 2000-2017 [employer]. [address] [place] [address], [place] [address]. All rights reserved. This information is not intended as a substitute for professional medical care. Always follow your healthcare professional's instructions.        Pneumonia (Adult)  Pneumonia is an infection deep within the lungs. It is in the small air sacs (alveoli). Pneumonia may be caused by a virus or bacteria. Pneumonia caused by bacteria is usually treated with an antibiotic. Severe cases may need to be treated in the hospital. Milder cases can be treated at home. Symptoms usually start to get better during the first 2 days of treatment.    Home care  Follow these guidelines when caring for yourself at home:  · Rest at home for the first 2 to 3 days, or until you feel stronger. Dont let yourself get overly tired when you go back to your activities.  · Stay away from cigarette smoke - yours or other peoples.  · You may use acetaminophen or ibuprofen to control fever or pain, unless another medicine was prescribed. If you have chronic liver or kidney disease, talk with your healthcare provider before using these medicines. Also talk with your provider if youve had a stomach ulcer or gastrointestinal bleeding. Dont give aspirin to anyone younger than 18 years of age who is ill with a fever. It may cause severe liver damage.  · Your appetite may be poor, so a light diet is fine.  · Drink 6 to 8 glasses of fluids every day to make sure you are getting enough fluids. Beverages can include water, sport drinks, sodas without caffeine, juices, tea, or soup. Fluids will help loosen secretions in the lung. This will make it easier for you to cough up the phlegm (sputum). If you also have heart or kidney disease, check with your  healthcare provider before you drink extra fluids.  · Take antibiotic medicine prescribed until it is all gone, even if you are feeling better after a few days.  Follow-up care  Follow up with your healthcare provider in the next 2 to 3 days, or as advised. This is to be sure the medicine is helping you get better.  If you are 65 or older, you should get a pneumococcal vaccine and a yearly flu (influenza) shot. You should also get these vaccines if you have chronic lung disease like asthma, emphysema, or COPD. Recently, a second type of pneumonia vaccine has become available for everyone over 65 years old. This is in addition to the previous vaccine. Ask your provider about this.  When to seek medical advice  Call your healthcare provider right away if any of these occur:  · You dont get better within the first 48 hours of treatment  · Shortness of breath gets worse  · Rapid breathing (more than 25 breaths per minute)  · Coughing up blood  · Chest pain gets worse with breathing  · Fever of 100.4°F (38°C) or higher that doesnt get better with fever medicine  · Weakness, dizziness, or fainting that gets worse  · Thirst or dry mouth that gets worse  · Sinus pain, headache, or a stiff neck  · Chest pain not caused by coughing  Date Last Reviewed: 1/1/2017  © 0809-5862 [employer]. [address] [place] [address], [place] [address]. All rights reserved. This information is not intended as a substitute for professional medical care. Always follow your healthcare professional's instructions.

## 2018-03-13 NOTE — PROGRESS NOTES
Received referral for High Risk screen and discharge risk pt with recent hospitalization from 3/5 to 3/11 for community acquired pneumonia.  Pt is known to me with recent OPCM services.  Verified pt identity and discussed outpatient case management services.  Advised on services offered and advantages and patient consented to these services. Patient verbalized knowledge that this is a free service and would last approximately 30 to 60 days.       83 yo female with history of hbp, fibromyalgia, breast ca and arthritis.  Pt lives alone and states she has a niece lily otoole and nephew who live near her to assist her as needed.  She states that is not often because she is independent and continues to drive.  She considers her self very active and social except this past 2 weeks due to pneumonia.  But is looking forward to getting better and getting back to her routine.  States she has lunch 2 to 3 times a week with friends and tries to keep busy.  Pt states she had just started feeling bad.  Her niece brought her to the urgent care on 3/4/18 and they just gave her cough medicine.  The next day she continued to feel bad and she decided to go to the emergency room.  She is still feeling a little weak and has a productive cough.  She states she does not have incentive spirometry but has a device that she blows into.  Possibly a acapella.  Advised on coughing and deep breathing and how it assists to open all airways and clear any accumulated mucous.  Pt states she still coughing up mucous but it is clear.  States she is taking cough medicine.  Advised that the cough, if it is not frequent, will clear excess mucous.  Advised to monitor color of the mucous and this should decrease as time passes.  Patient verbalized understanding  Pt states no falls since the slip on the wet floor several months ago.  She admits that she is a little weak since discharge from hospital so she is using the cane and walker she has as a  precaution.      Nursing screen and assessment completed today telephonically.      Plan of care developed with pt input.  Short and long term goals reviewed with patient and patient verbalized understanding and agreement to these goals    Several interventions were completed today as noted    Welcome statement and contact information as well as krames information sent via mail.  Pt advised to look in mail for communication    Plan  Follow up on receipt of education material  Education on pneumonia and prevention  Education on coughing and deep breathing exercises

## 2018-03-13 NOTE — PROGRESS NOTES
Thank you for the referral. The following patient has been assigned to Kristie Mancuso, RN with Outpatient Complex Care Management for high risk screening.    Reason for referral: Community acquired pneumonia, unspecified laterality    Please contact [facility] at [phone] with any questions.    Thank you,    Pam Luong, [facility]  Outpatient Complex Care Mgmt  Ext [phone]

## 2018-03-13 NOTE — PATIENT INSTRUCTIONS
Treating Pneumonia  Pneumonia is an infection of one or both of the lungs. Pneumonia:  · Is usually caused by either a virus or a bacteria  · Can be very serious, especially in infants, young children, and older adults. Its also serious for those with other long-term health problems or weakened immune systems.  · Is sometimes treated at home and sometimes in the hospital    Antibiotic medicines  Antibiotics may be prescribed for pneumonia caused by bacteria. They may be pills (oral medicines), or shots (injections). Or they may be given by IV (intravenously) into a vein. If you are taking oral medicines at home:  · Fill your prescription and start taking your medicine as soon as you can.  · You will likely start to feel better in a day or 2, but dont stop taking the antibiotic.  · Use a pill organizer to help you remember to take your medicine.  · Let your healthcare provider know if you have side effects.  · Take your medicine exactly as directed on the label. Talk to your provider or pharmacist if you have any questions.  Antiviral medicines  Antiviral medicine may be prescribed for pneumonia caused by a virus. For example, antiviral medicine may be prescribed for pneumonia caused by the flu virus. Antibiotics do not work against viruses. If you are taking antiviral medicine at home:  · Fill your prescription and start taking your medicine as soon as you can.  · Talk with your provider or pharmacist about possible side effects.  · Take the medicine exactly as instructed.  To relieve symptoms  There are many medicines that can help relieve symptoms of pneumonia. Some are prescription and some are over-the-counter.  Your healthcare provider may recommend:  · Acetaminophen or ibuprofen to lower your fever and to lessen headache or other pain  · Cough medicine to loosen mucus or to reduce coughing  Make sure you check with your healthcare provider or pharmacist before taking any over-the-counter medicines.  Special  treatments  If you are hospitalized for pneumonia, you may have other therapies, including:  · Inhaled medicines to help with breathing or chest congestion  · Supplemental oxygen to increase low oxygen levels  Drink fluids and eat healthy  You should eat healthy to help your body fight the infection. Drinking a lot of fluids helps to replace fluids lost from fever and to loosen mucus in your chest.  · Diet. Make healthy food choices, including fruits and vegetables, lean meats and other proteins, 100% whole grain and low- or no-fat dairy products.  · Fluids. Drink at least 6 to 8 tall glasses a day. Water and 100% fruit or vegetable juice are best.  Get plenty of rest and sleep  You may be more tired than usual for a while. It is important to get enough sleep at night. Its also important to rest during the day. Talk with your healthcare provider if coughing or other symptoms are interfering with your sleep.  Preventing the spread of germs  The best thing you can do to prevent spreading germs is to wash your hands often. You should:  · Rub your hand with soap and water for 20 to 30 seconds.  · Clean in between your fingers, the backs of your hands, and around your nails.  · Dry your hands on a separate towel or use paper towels.  You should also:  · Keep alcohol-based hand  nearby.  · Make sure you also clean surfaces that you touch. Use a product that kills all types of germs.  · Stay away from others until you are feeling better.  When to call your healthcare provider  Call your healthcare provider if you have any of the following:  · Symptoms get worse  · Fever continues  · Shortness of breath gets worse  · Increased mucus or mucus that is darker in color  · Coughing gets worse  · Lips or fingers are bluish in color  · Side effects from your medicine   Date Last Reviewed: 12/1/2016  © 4102-5881 [employer]. [address] [place] [address], [place] [address]. All rights reserved. This information is  not intended as a substitute for professional medical care. Always follow your healthcare professional's instructions.

## 2018-03-14 ENCOUNTER — OFFICE VISIT (OUTPATIENT)
Dept: FAMILY MEDICINE | Facility: CLINIC | Age: 83
End: 2018-03-14
Payer: MEDICARE

## 2018-03-14 VITALS
RESPIRATION RATE: 20 BRPM | DIASTOLIC BLOOD PRESSURE: 50 MMHG | TEMPERATURE: 99 F | WEIGHT: 138.44 LBS | HEIGHT: 63 IN | SYSTOLIC BLOOD PRESSURE: 108 MMHG | BODY MASS INDEX: 24.53 KG/M2

## 2018-03-14 DIAGNOSIS — E83.42 HYPOMAGNESEMIA: ICD-10-CM

## 2018-03-14 DIAGNOSIS — E87.1 HYPONATREMIA: ICD-10-CM

## 2018-03-14 DIAGNOSIS — D64.9 ANEMIA, UNSPECIFIED TYPE: Primary | ICD-10-CM

## 2018-03-14 PROCEDURE — 99213 OFFICE O/P EST LOW 20 MIN: CPT | Mod: PBBFAC,PO | Performed by: FAMILY MEDICINE

## 2018-03-14 PROCEDURE — 99999 PR PBB SHADOW E&M-EST. PATIENT-LVL III: CPT | Mod: PBBFAC,,, | Performed by: FAMILY MEDICINE

## 2018-03-14 PROCEDURE — 99496 TRANSJ CARE MGMT HIGH F2F 7D: CPT | Mod: S$PBB,,, | Performed by: FAMILY MEDICINE

## 2018-03-14 PROCEDURE — 99496 TRANSJ CARE MGMT HIGH F2F 7D: CPT | Mod: PBBFAC,PO | Performed by: FAMILY MEDICINE

## 2018-03-14 NOTE — PROGRESS NOTES
Subjective:       Patient ID: Gerda Lai is a 85 y.o. female.    Chief Complaint: Hospital Follow Up    HPI  Review of Systems    Objective:      Physical Exam    Assessment:       No diagnosis found.    Plan:       ***

## 2018-03-14 NOTE — PROGRESS NOTES
Transitional Care Note  Subjective:       Patient ID: Gerda Lai is a 85 y.o. female.  Chief Complaint: Hospital Follow Up    Family and/or Caretaker present at visit?  Yes.  Diagnostic tests reviewed/disposition: No diagnosic tests pending after this hospitalization.  Disease/illness education: discussed labs, Xray results with pt  Home health/community services discussion/referrals: Patient does not have home health established from hospital visit.  They do not need home health.  If needed, we will set up home health for the patient.   Establishment or re-establishment of referral orders for community resources: No other necessary community resources.   Discussion with other health care providers: No discussion with other health care providers necessary.     Pt presents for follow up after hospital admission x 6 days for pneumonia.  Per hospital discharge:  Gerda Lai is a 85-year-old female with HTN and distant history of breast cancer (lumpectomy/radiation) presenting with productive cough and chills. Patient reports subjective chills without fever over the past several days. She subsequently developed a productive cough of green sputum on Saturday night that progressively worsened over Sunday. She went to an urgent care appointment on Sunday and was released with a diagnosis of URI with a prescription for tessalon perles and robitussin dm. Her symptoms did not improve today which prompted her visit to the ED. On interview, patient reports some SOB associated with coughing spells but has no respiratory distress at baseline. She has had no sick contacts that she is aware of. She is a nonsmoker with no respiratory history. She denies HA, fever, N/V, abdo pain, diarrhea, or dysuria.     In the ED, she was HDS in no respiratory distress saturating adequately on room air. Her work-up was significant for WBC 16.9, procal 0.47, and with a normal UA. CXR did not note consolidation or infiltrates. TBili 2.1  evaluated with ultrasound which was unremarkable. She was given ceftriaxone 1 g IV x1 and doxycycline 100 mg PO x1 for CAP.     * No surgery found *       Hospital Course:   3/6: Subjective improvement today. WBC and procal slightly increased and spiked low-grade fever 100.5 overnight. Continue moxifloxacin  3/7: WBC flat. Procal improved. Patient feels subjectively better. Will continue moxifloxacin.  3/9: Finished 5 day course of moxi. No change in WBC. Procal improved. Patient spiked 100.5 fever this AM and feels fatigued. Repeat CXR showing patchy infiltrate of right lower lung with small pleural effusion, BNP normal. Switched abx to ceftriaxone and azithromycin.  3/10: Subjective improvement. New hyponatremia in the setting of increased chlorthalidone dosing. Increased BB and decreased thiazide.  3/11: WBC with substantial improvement today. Likely atypical pneumonia that has responded well to azithromycin. Hyponatremia resolved. Patient discharged with 2 days left of 5 day course of azithromycin for CAP. Patient evaluated by PT/OT while inpatient and recommended outpatient rehab if desired. During her stay, she was able to ambulate around the room without difficulty and not significantly deconditioned. Family is able to support her while she recovers from this episode.     HPI  Review of Systems   Constitutional: Positive for fatigue. Negative for chills and fever.   HENT: Negative for congestion, rhinorrhea and sore throat.    Respiratory: Negative for choking, chest tightness, shortness of breath and wheezing.    Cardiovascular: Negative for chest pain.   Neurological: Positive for dizziness.        Recurrent dizziness, postural in nature       Objective:      Physical Exam   Constitutional: She appears well-developed and well-nourished. No distress.   Neck: Normal range of motion. No JVD present. No thyromegaly present.   Cardiovascular: Normal rate and regular rhythm.    No murmur heard.  Pulmonary/Chest:  Effort normal. No respiratory distress. She has no wheezes. She has rales.   Few rales, RLL       Assessment:     1.  Pneumonia  2.  Hypotension  3.  Hypomagnesemia  4.  hyponatremia  Plan:       1.  Stop Chlorthalidone, decrease dosage of Labetalol to 1/2 tab twice daily  2.  CBC, BMP, magnesium  3.  F/u 3 weeks

## 2018-03-16 ENCOUNTER — CLINICAL SUPPORT (OUTPATIENT)
Dept: REHABILITATION | Facility: HOSPITAL | Age: 83
End: 2018-03-16
Payer: MEDICARE

## 2018-03-16 ENCOUNTER — LAB VISIT (OUTPATIENT)
Dept: LAB | Facility: HOSPITAL | Age: 83
End: 2018-03-16
Attending: INTERNAL MEDICINE
Payer: MEDICARE

## 2018-03-16 DIAGNOSIS — M25.562 CHRONIC PAIN OF LEFT KNEE: Primary | ICD-10-CM

## 2018-03-16 DIAGNOSIS — E78.00 PURE HYPERCHOLESTEROLEMIA: ICD-10-CM

## 2018-03-16 DIAGNOSIS — G89.29 CHRONIC PAIN OF LEFT KNEE: Primary | ICD-10-CM

## 2018-03-16 DIAGNOSIS — D64.9 ANEMIA, UNSPECIFIED TYPE: ICD-10-CM

## 2018-03-16 DIAGNOSIS — R29.898 LEFT LEG WEAKNESS: ICD-10-CM

## 2018-03-16 DIAGNOSIS — I10 HYPERTENSION, ESSENTIAL: ICD-10-CM

## 2018-03-16 DIAGNOSIS — E83.42 HYPOMAGNESEMIA: ICD-10-CM

## 2018-03-16 LAB
ALT SERPL W/O P-5'-P-CCNC: 31 U/L
ANION GAP SERPL CALC-SCNC: 8 MMOL/L
AST SERPL-CCNC: 23 U/L
BASOPHILS # BLD AUTO: 0.07 K/UL
BASOPHILS NFR BLD: 0.7 %
BUN SERPL-MCNC: 26 MG/DL
CALCIUM SERPL-MCNC: 9.9 MG/DL
CHLORIDE SERPL-SCNC: 105 MMOL/L
CHOLEST SERPL-MCNC: 141 MG/DL
CHOLEST/HDLC SERPL: 3.8 {RATIO}
CO2 SERPL-SCNC: 25 MMOL/L
CREAT SERPL-MCNC: 1 MG/DL
DIFFERENTIAL METHOD: ABNORMAL
EOSINOPHIL # BLD AUTO: 0.2 K/UL
EOSINOPHIL NFR BLD: 1.6 %
ERYTHROCYTE [DISTWIDTH] IN BLOOD BY AUTOMATED COUNT: 11.6 %
EST. GFR  (AFRICAN AMERICAN): 59.4 ML/MIN/1.73 M^2
EST. GFR  (NON AFRICAN AMERICAN): 51.5 ML/MIN/1.73 M^2
GLUCOSE SERPL-MCNC: 94 MG/DL
HCT VFR BLD AUTO: 32.2 %
HDLC SERPL-MCNC: 37 MG/DL
HDLC SERPL: 26.2 %
HGB BLD-MCNC: 10.7 G/DL
IMM GRANULOCYTES # BLD AUTO: 0.14 K/UL
IMM GRANULOCYTES NFR BLD AUTO: 1.3 %
LDLC SERPL CALC-MCNC: 66.8 MG/DL
LYMPHOCYTES # BLD AUTO: 2.4 K/UL
LYMPHOCYTES NFR BLD: 22.2 %
MAGNESIUM SERPL-MCNC: 1.7 MG/DL
MCH RBC QN AUTO: 34 PG
MCHC RBC AUTO-ENTMCNC: 33.2 G/DL
MCV RBC AUTO: 102 FL
MONOCYTES # BLD AUTO: 0.9 K/UL
MONOCYTES NFR BLD: 8.7 %
NEUTROPHILS # BLD AUTO: 7 K/UL
NEUTROPHILS NFR BLD: 65.5 %
NONHDLC SERPL-MCNC: 104 MG/DL
NRBC BLD-RTO: 0 /100 WBC
PLATELET # BLD AUTO: 371 K/UL
PMV BLD AUTO: 8.9 FL
POTASSIUM SERPL-SCNC: 4.7 MMOL/L
RBC # BLD AUTO: 3.15 M/UL
SODIUM SERPL-SCNC: 138 MMOL/L
TRIGL SERPL-MCNC: 186 MG/DL
WBC # BLD AUTO: 10.61 K/UL

## 2018-03-16 PROCEDURE — 84460 ALANINE AMINO (ALT) (SGPT): CPT

## 2018-03-16 PROCEDURE — 36415 COLL VENOUS BLD VENIPUNCTURE: CPT | Mod: PO

## 2018-03-16 PROCEDURE — 97164 PT RE-EVAL EST PLAN CARE: CPT | Mod: PO

## 2018-03-16 PROCEDURE — G8979 MOBILITY GOAL STATUS: HCPCS | Mod: CK,PO

## 2018-03-16 PROCEDURE — 85025 COMPLETE CBC W/AUTO DIFF WBC: CPT

## 2018-03-16 PROCEDURE — 83735 ASSAY OF MAGNESIUM: CPT

## 2018-03-16 PROCEDURE — 84450 TRANSFERASE (AST) (SGOT): CPT

## 2018-03-16 PROCEDURE — 80061 LIPID PANEL: CPT

## 2018-03-16 PROCEDURE — 80048 BASIC METABOLIC PNL TOTAL CA: CPT

## 2018-03-16 PROCEDURE — G8980 MOBILITY D/C STATUS: HCPCS | Mod: CK,PO

## 2018-03-16 NOTE — PROGRESS NOTES
Name: Gerda Lai  Clinic Number: [id number]  03/16/2018.     Diagnosis: L knee pain    Physician: Bianca Hu PA-C  Treatment Orders: PT Eval and Treat    Past Medical History:   Diagnosis Date    Arthritis     Basal cell carcinoma 09/2016    right post auricular neck     Breast cancer     Cataract     Fibromyalgia     Hyperlipidemia     Hypertension     Personal history of colonic polyps     SCC (squamous cell carcinoma) 2015    R chest    SCC (squamous cell carcinoma) 2016    left medial shoulder    SCC (squamous cell carcinoma) 2017    left knee    Squamous cell carcinoma 2015    right forearm    Thyroid disease     Vaginitis      Current Outpatient Prescriptions   Medication Sig    acetaminophen (TYLENOL) 650 MG TbSR Take 650 mg by mouth as needed (pain).     ascorbic acid (VITAMIN C) 100 MG tablet Take 100 mg by mouth 2 (two) times daily.     B INFANTIS/B ANI/B JOSE/B BIFID (PROBIOTIC 4X ORAL) Take 1 tablet by mouth daily    chlorthalidone (HYGROTEN) 25 MG Tab Take 25 mg by mouth once daily.     coenzyme Q10 (CO Q-10) 100 mg capsule Take 100 mg by mouth once daily.    dextromethorphan-guaifenesin  mg/5 ml (ROBITUSSIN-DM)  mg/5 mL liquid Take 20 mLs by mouth 4 (four) times daily.    glucosamine-chondroitin 500-400 mg tablet Take 1 tablet by mouth once daily.     labetalol (NORMODYNE) 100 MG tablet TAKE 1 AND 1/2 TABLETS BY MOUTH TWICE A DAY    LORazepam (ATIVAN) 0.5 MG tablet TAKE 1/2 TABLET BY MOUTH TWICE A DAY    multivitamin capsule Take 1 capsule by mouth once daily.    omeprazole (PRILOSEC OTC) 20 MG tablet Take 20 mg by mouth once daily.      pravastatin (PRAVACHOL) 40 MG tablet TAKE 1 TABLET (40 MG TOTAL) BY MOUTH ONCE DAILY.    TEKTURNA 300 mg Tab Take 300 mg by mouth once daily.      No current facility-administered medications for this visit.      Review of patient's allergies indicates:   Allergen Reactions    Sulfa (sulfonamide antibiotics) Rash     Clarithromycin Other (See Comments)     Weak, extreme fatigue. dizziness    Flexeril [cyclobenzaprine] Other (See Comments)     Dizziness    Iodine and iodide containing products     Lisinopril Other (See Comments)     Cough and sensation of throat swelling/?angioedema    Losartan Rash    Metoprolol Swelling     Tightness in throat    Tramadol Other (See Comments)     Dizzy and weak    Verapamil (bulk) Palpitations    Voltaren [diclofenac sodium] Other (See Comments)     Drops blood pressure     Precautions: universal       Subjective:       History of current condition: Gerda is a 85 y.o. female referred to PT for knee pain. Was doing PT at [facility], then [facility] to [facility], but was admitted tot [facility] 2/2 pneumonia. Does feel weaker, but feels she is making progress. Would like to go back to [facility]. States she takes a cane with her but doesn't always have to use it. States she liked [facility] but [facility] is closer.         Objective:      Visit # 1  Time In: 1230  Time Out: 1330  Treatment time: 30  Date of eval/re-eval: 3/16/2018       Written HEP Provided: yes  Patient education: [facility]  Gerda demo good understanding of the education provided. Patient demo good return demo of skill of exercises.    Problem List: muscle length impairments, decreased motor control and mobility, impaired ADLs, activity and participation restrictions.     Personal factors: n/a    CPT Code reference        Hx  Exam  Presentation   Code     Low     0   1-2    Stable    51077     Mod     1-2   3    Evolving    63938     High     3+   4+     Unstable    63693       Assessment:    Physical therapy diagnosis: knee hypomobility syndrome  Encounter Diagnoses   Name Primary?    Left leg weakness     Chronic pain of left knee Yes     Rehab potential: good    Gerda presents with the above listed impairments/tolerated tx without increase in sxs. Called Anika yang and they will  coordinate to get Gerda's [facility] account transferred to [facility].    Gerda will benefit from skilled PT services to address these impairments and progress towards the below listed functional goals.  Gerda is in agreement with the goals that will be addressed in the treatment plan.Gerda demonstrates no additional cultural, spiritual or educational needs and currently has no barriers to learning.      Medical necessity: see problem list -  indicates low / mod / high complexity based on clinical presentation that is stable / evolving / unstable        Functional Goals  Time frame     1.          2.          3.          4.   The pt will be independent in performance and progression of HEP.     2-3 visits            Plan:      DC from PT.      CIELO Bradshaw, PT, DPT  Doctor of Physical Therapy   Board Certified Orthopaedic Clinical Specialist    Certified Strength and Conditioning Specialist     I certify the need for these services furnished under this plan of treatment and while under my care.       ___________________________________  Physician/Referring Practitioner        _________________  Date of Signature

## 2018-03-19 ENCOUNTER — TELEPHONE (OUTPATIENT)
Dept: FAMILY MEDICINE | Facility: CLINIC | Age: 83
End: 2018-03-19

## 2018-03-19 ENCOUNTER — OUTPATIENT CASE MANAGEMENT (OUTPATIENT)
Dept: ADMINISTRATIVE | Facility: OTHER | Age: 83
End: 2018-03-19

## 2018-03-19 DIAGNOSIS — I10 ESSENTIAL HYPERTENSION: ICD-10-CM

## 2018-03-19 NOTE — TELEPHONE ENCOUNTER
----- Message from Kristie Mancuso RN sent at 3/19/2018  9:33 AM CDT -----  I see the orders for medication changes for discontinuation of chlorthalidone and to decrease the labetalol to 1/2 tablet bid.  The medication record shows the chlorthalidone as active and the labetalol dose is 1 and 1/2 tablets could this be updated.      Kristie Mancuso RN,[facility]  Outpatient Complex Case Management  [phone]

## 2018-03-19 NOTE — PROGRESS NOTES
Summary  3/19/18  Pt states she is feeling much better over all.  Pt states she received the information in the mail along with my contact info.   Pt states she is using the breathing device about 2 to 3 times a day.  Occasional non productive cough. On a whole much improved from respiratory stand point   States she got weak last week and was lightheaded.  Dr. Sellers had advised her to discontinue the fluid pill and decrease the labetalol from 1.5 tablets to .5 tablets 2 times a day.  Medication record does not reflect changes but the avf has the clear instructions to dc fluid pill and decrease the labetolol.  Pt states she is taking her blood pressure and although she is feeling better and not as light headed but her blood pressure is gradually increasing and she is concerned.  3/15/18 157/60 165/66 pulse in 80's  1 185/72 .  These blood pressures sent to dr. Area high priority   Advised pt to contact me if they call her directly about the blood pressures      Interventions  Pneumonia  We reviewed information on pneumonia signs and symptoms and prevention again and pt verbalized understanding.     message to dr. Sellers advising on need to update medication record and also on pt blood pressure that is trending up after the medication changes        plan  Follow up on message to dr. Area on pt blood pressure trending up ( did he contact pt)  Follow up on receipt of education material  Education on pneumonia and prevention  Education on coughing and deep breathing exercises

## 2018-03-20 ENCOUNTER — TELEPHONE (OUTPATIENT)
Dept: FAMILY MEDICINE | Facility: CLINIC | Age: 83
End: 2018-03-20

## 2018-03-20 RX ORDER — LABETALOL 100 MG/1
TABLET, FILM COATED ORAL
Qty: 135 TABLET | Refills: 1 | Status: SHIPPED | OUTPATIENT
Start: 2018-03-20 | End: 2018-04-11 | Stop reason: SDUPTHER

## 2018-03-20 NOTE — TELEPHONE ENCOUNTER
Message left on voicemail instructing patient to return call for further instructions ( to increase Labetalol to 1 tab twice daily).

## 2018-03-20 NOTE — TELEPHONE ENCOUNTER
Patient informed regarding instructions and verbalizes understanding. Patient scheduled follow up appointment.

## 2018-03-20 NOTE — TELEPHONE ENCOUNTER
----- Message from Kristie Mancuso RN sent at 3/19/2018  4:41 PM CDT -----  Pt states her blood pressures are trending up since the medication changes.  States she stopped the fluid pill and cut her labetalol to 1/2 tab  2 time a day (100 mg)  She was taking 1.5 tablets 2 time a day (150 mg).  3/15 157/60 p 81  3/16  161/66 p 84   3/17  165/66 p 82  3/17 185/72 p 84  3/18 165/66 p 82  She is concerned and would like you to know these numbers    Please advise patient   Kristie Mancuso RN,[facility]  Outpatient Complex Case Management  [phone]

## 2018-03-22 NOTE — PLAN OF CARE
Pt d/c to home.     03/22/18 1203   Final Note   Assessment Type Final Discharge Note   Discharge Disposition Home   What phone number can be called within the next 1-3 days to see how you are doing after discharge? [phone]   Hospital Follow Up  Appt(s) scheduled? Yes   Right Care Referral Info   Post Acute Recommendation No Care

## 2018-03-22 NOTE — PHYSICIAN QUERY
PT Name: Gerda Lai  MR #: [id number]     Physician Query Form - Medication-Correlation for Diagnosis      CDS/: Chanel Benz RN              Contact information: [email or web address]    This form is a permanent document in the medical record.     Query Date: March 22, 2018      By submitting this query, we are merely seeking further clarification of documentation.  Please utilize your independent clinical judgment when addressing the question(s) below.    The medical record contains the following:  The patient has an order for the following medication(s):    Magnesium oxide 400mg PO once noted on MAR 3/7  @ 1605  Magnesium 1.4mg/dl collected at 3/7 @ 0258           Please provider the corresponding diagnosis or diagnoses that support(s) the use of the medication(s)   Physician Use Only    [  x ]  Hypomagnesemia  [   ]  Other (please specify ______________________________

## 2018-03-23 ENCOUNTER — OFFICE VISIT (OUTPATIENT)
Dept: CARDIOLOGY | Facility: CLINIC | Age: 83
End: 2018-03-23
Payer: MEDICARE

## 2018-03-23 VITALS
HEART RATE: 62 BPM | BODY MASS INDEX: 24.83 KG/M2 | DIASTOLIC BLOOD PRESSURE: 64 MMHG | SYSTOLIC BLOOD PRESSURE: 146 MMHG | HEIGHT: 63 IN | WEIGHT: 140.13 LBS

## 2018-03-23 DIAGNOSIS — I10 HYPERTENSION, ESSENTIAL: Primary | ICD-10-CM

## 2018-03-23 DIAGNOSIS — E78.00 PURE HYPERCHOLESTEROLEMIA: ICD-10-CM

## 2018-03-23 PROCEDURE — 99213 OFFICE O/P EST LOW 20 MIN: CPT | Mod: S$PBB,,, | Performed by: NURSE PRACTITIONER

## 2018-03-23 PROCEDURE — 99999 PR PBB SHADOW E&M-EST. PATIENT-LVL III: CPT | Mod: PBBFAC,,, | Performed by: NURSE PRACTITIONER

## 2018-03-23 PROCEDURE — 99213 OFFICE O/P EST LOW 20 MIN: CPT | Mod: PBBFAC,PO | Performed by: NURSE PRACTITIONER

## 2018-03-23 NOTE — PROGRESS NOTES
Ms. Lai is a patient of Dr. Camarena and was last seen in [facility] 3/23/2017.      Subjective:   Patient ID:  Gerda Lai is a 85 y.o. female who presents for follow-up of Hypertension  .   Problem List:  Hypertension  - intolerance to multiple antihypertensive meds   HLD  Breast cancer    HPI:     Ms. Lai is an 86yo female with HTN, HLD, and a history of breast cancer here for follow up. She says that she recently spent a week in the hospital ([facility]) for community acquired pneumonia (3/5/2018-3/11/2018). She is doing much better but is still SOB from time to time. She is starting physical therapy for rehabilitation. Ms. Lai denies chest pain with exertion or at rest, palpitations, syncope, leg edema, claudication, PND, or orthopnea. She says that she had left knee pain for months but while she was in the hospital she had her knee drained and now she is pain-free. When she went to her PCP 3/14/2018 she was reporting light-headedness, so he d/bryn her chlorthalidone and had her take 1/2 tablet of labetalol but she has since returned to a full tablet of labetalol. She says her light-headedness has improved but she still feels it from time to time.     She is currently taking pravastatin 40mg for lipid management. LDL 66 on 3/16/2018. She is also taking labetalol 100mg BID and aliskiren 300mg daily. BPs are 140s systolic. Creatinine is 1. K is 4.7. Hepatic transaminases are within normal limits. HA1C is 5.5. Weight is down 1lbs since last clinic visit.     Recent Cardiac Tests:    DSE (6/19/2017):  CONCLUSIONS     1 - Normal left ventricular systolic function (EF 55-60%).     2 - Normal left ventricular diastolic function.     3 - Normal right ventricular systolic function .   No evidence of stress induced myocardial ischemia.     Current Outpatient Prescriptions   Medication Sig    acetaminophen (TYLENOL) 650 MG TbSR Take 650 mg by mouth as needed (pain).     ascorbic acid (VITAMIN C) 100 MG  "tablet Take 100 mg by mouth 2 (two) times daily.     B INFANTIS/B ANI/B JOSE/B BIFID (PROBIOTIC 4X ORAL) Take 1 tablet by mouth daily    coenzyme Q10 (CO Q-10) 100 mg capsule Take 100 mg by mouth once daily.    glucosamine-chondroitin 500-400 mg tablet Take 1 tablet by mouth once daily.     labetalol (NORMODYNE) 100 MG tablet 1/2 tablet twice daily (Patient taking differently: Take 100 mg by mouth every 12 (twelve) hours. 1/2 tablet twice daily)    LORazepam (ATIVAN) 0.5 MG tablet TAKE 1/2 TABLET BY MOUTH TWICE A DAY    multivitamin capsule Take 1 capsule by mouth once daily.    omeprazole (PRILOSEC OTC) 20 MG tablet Take 20 mg by mouth once daily.      pravastatin (PRAVACHOL) 40 MG tablet TAKE 1 TABLET (40 MG TOTAL) BY MOUTH ONCE DAILY.    TEKTURNA 300 mg Tab Take 300 mg by mouth once daily.      No current facility-administered medications for this visit.      Review of Systems   Constitution: Positive for weakness and weight loss. Negative for malaise/fatigue.   Eyes: Negative for blurred vision.   Cardiovascular: Negative for chest pain, claudication, dyspnea on exertion, irregular heartbeat, leg swelling, orthopnea, palpitations, paroxysmal nocturnal dyspnea and syncope.   Respiratory: Positive for cough, shortness of breath and sputum production.    Hematologic/Lymphatic: Negative for bleeding problem.   Skin: Negative for rash.   Musculoskeletal: Positive for arthritis and muscle weakness. Negative for myalgias.   Gastrointestinal: Negative for abdominal pain, constipation, diarrhea and nausea.   Genitourinary: Negative for hematuria.   Neurological: Positive for dizziness. Negative for headaches, loss of balance and numbness.   Psychiatric/Behavioral: Negative for altered mental status.   Allergic/Immunologic: Negative for persistent infections.     Objective:        BP (!) 146/64   Pulse 62   Ht 5' 3" (1.6 m)   Wt 63.6 kg (140 lb 1.6 oz)   BMI 24.82 kg/m²     Physical Exam   Constitutional: She " is oriented to person, place, and time. She appears well-developed and well-nourished.   HENT:   Head: Normocephalic.   Nose: Nose normal.   Eyes: Pupils are equal, round, and reactive to light.   Neck: No JVD present. Carotid bruit is not present.   Cardiovascular: Normal rate, regular rhythm, S1 normal and S2 normal.  Exam reveals no gallop.    No murmur heard.  Pulses:       Carotid pulses are 2+ on the right side, and 2+ on the left side.       Radial pulses are 2+ on the right side, and 2+ on the left side.        Dorsalis pedis pulses are 2+ on the right side, and 2+ on the left side.   Pulmonary/Chest: Breath sounds normal. No respiratory distress.   Abdominal: Soft. Bowel sounds are normal. She exhibits no distension. There is no tenderness.   Musculoskeletal: Normal range of motion. She exhibits no edema.   Neurological: She is alert and oriented to person, place, and time.   Skin: Skin is warm and dry. No erythema.   Psychiatric: She has a normal mood and affect. Her speech is normal and behavior is normal.   Nursing note and vitals reviewed.    Lab Results   Component Value Date     03/16/2018    K 4.7 03/16/2018    MG 1.7 03/16/2018     03/16/2018    CO2 25 03/16/2018    BUN 26 (H) 03/16/2018    CREATININE 1.0 03/16/2018    GLU 94 03/16/2018    HGBA1C 5.5 05/27/2011    AST 23 03/16/2018    ALT 31 03/16/2018    ALBUMIN 2.7 (L) 03/10/2018    PROT 6.7 03/10/2018    BILITOT 0.7 03/10/2018    WBC 10.61 03/16/2018    HGB 10.7 (L) 03/16/2018    HCT 32.2 (L) 03/16/2018     (H) 03/16/2018     (H) 03/16/2018    TSH 2.179 03/30/2016    CHOL 141 03/16/2018    HDL 37 (L) 03/16/2018    LDLCALC 66.8 03/16/2018    TRIG 186 (H) 03/16/2018         Recent Labs  Lab 03/30/16  0156   INR 1.1        Test(s) Reviewed  I have reviewed the following in detail:  [] Stress test   [] Angiography   [] Echocardiogram   [x] Labs   [] Other:         Assessment:         1. Hypertension, essential. Patient  stable in the 140s after reporting light-headed on chlorthalidone. Will leave off chlorthalidone and continue current regimen. Instructed patient to take her medications at a fixed time each day.      2. Pure hypercholesterolemia. LDL 66.8 on pravastatin 40mg.     Plan:     Gerda was seen today for hypertension.    Diagnoses and all orders for this visit:    Hypertension, essential  -     Basic metabolic panel; Future; Expected date: 03/23/2019    Pure hypercholesterolemia  -     Lipid panel; Future; Expected date: 03/23/2019  -     ALT (SGPT); Future; Expected date: 03/23/2019  -     AST (SGOT); Future; Expected date: 03/23/2019      Continue current medications.  Make sure you take your medications at the same time each day.    Follow-up in about 1 year (around 3/23/2019).    ------------------------------------------------------------------    WEST Schilling, NP-C  Consult Cardiology

## 2018-03-23 NOTE — PATIENT INSTRUCTIONS
Continue current medications.  Make sure you take your medications at the same time each day.  Follow up in one year.

## 2018-03-26 ENCOUNTER — OUTPATIENT CASE MANAGEMENT (OUTPATIENT)
Dept: ADMINISTRATIVE | Facility: OTHER | Age: 83
End: 2018-03-26

## 2018-03-26 ENCOUNTER — TELEPHONE (OUTPATIENT)
Dept: FAMILY MEDICINE | Facility: CLINIC | Age: 83
End: 2018-03-26

## 2018-03-26 NOTE — PATIENT INSTRUCTIONS
Eating Heart-Healthy Food: Using the DASH Plan    Eating for your heart doesnt have to be hard or boring. You just need to know how to make healthier choices. The DASH eating plan has been developed to help you do just that. DASH stands for Dietary Approaches to Stop Hypertension. It is a plan that has been proven to be healthier for your heart and to lower your risk for high blood pressure. It can also help lower your risk for cancer, heart disease, osteoporosis, and diabetes.  Choosing from each food group  Choose foods from each of the food groups below each day. Try to get the recommended number of servings for each food group. The serving numbers are based on a diet of 2,000 calories a day. Talk to your doctor if youre unsure about your calorie needs. Along with getting the correct servings, the DASH plan also recommends a sodium intake less than 2,300 mg per day.        Grains  Servings: 6 to 8 a day  A serving is:  · 1 slice bread  · 1 ounce dry cereal  · Half a cup cooked rice, pasta or cereal  Best choices: Whole grains and any grains high in fiber. Vegetables  Servings: 4 to 5 a day  A serving is:  · 1 cup raw leafy vegetable  · Half a cup cut-up raw or cooked vegetable  · Half a cup vegetable juice  Best choices: Fresh or frozen vegetables prepared without added salt or fat.   Fruits  Servings: 4 to 5 a day  A serving is:  · 1 medium fruit  · One-quarter cup dried fruit  · Half a cup fresh, frozen, or canned fruit  · Half a cup of 100% fruit juices  Best choices: A variety of fresh fruits of different colors. Whole fruits are a better choice than fruit juices. Low-fat or fat-free dairy  Servings: 2 to 3 a day  A serving is:  · 1 cup milk  · 1 cup yogurt  · One and a half ounces cheese  Best choices: Skim or 1% milk, low-fat or fat-free yogurt or buttermilk, and low-fat cheeses.         Lean meats, poultry, fish  Servings: 6 or fewer a day  A serving is:  · 1 ounce cooked meats, poultry, or fish  · 1  egg  Best choices: Lean poultry and fish. Trim away visible fat. Broil, grill, roast, or boil instead of frying. Remove skin from poultry before eating. Limit how much red meat you eat.  Nuts, seeds, beans  Servings: 4 to 5 a week  A serving is:  · One-third cup nuts (one and a half ounces)  · 2 tablespoons nut butter or seeds  · Half a cup cooked dry beans or legumes  Best choices: Dry roasted nuts with no salt added, lentils, kidney beans, garbanzo beans, and whole ruff beans.   Fats and oils  Servings: 2 to 3 a day  A serving is:  · 1 teaspoon vegetable oil  · 1 teaspoon soft margarine  · 1 tablespoon mayonnaise  · 2 tablespoons salad dressing  Best choices: Nut and vegetable oils (nontropical vegetable oils), such as olive and canola oil. Sweets  Servings: 5 a week or fewer  A serving is:  · 1 tablespoon sugar, maple syrup, or honey  · 1 tablespoon jam or jelly  · 1 half-ounce jelly beans (about 15)  · 1 cup lemonade  Best choices: Dried fruit can be a satisfying sweet. Choose low-fat sweets. And watch your serving sizes!      For more on the DASH eating plan, visit:  www.nhlbi.nih.gov/health/health-topics/topics/dash   Date Last Reviewed: 6/1/2016  © 8736-7805 "[employer]. [address] [place] [address], [place] [address]. All rights reserved. This information is not intended as a substitute for professional medical care. Always follow your healthcare professional's instructions.        What is High Blood Pressure?  High blood pressure (also called hypertension) is known as the silent killer. This is because most of the time it doesnt cause symptoms. In fact, many people dont know they have it until other problems develop. In most cases, high blood pressure cant be cured. Its a disease that often requires lifelong treatment. The good news is that it can be managed.  Understanding blood pressure  The circulatory system is made up of the heart and blood vessels that carry blood through the body. Your  heart is the pump for this system. With each heartbeat (contraction), the heart sends blood out through large blood vessels called arteries. Blood pressure is a measure of how hard the moving blood pushes against the walls of the arteries.  High blood pressure can harm your health  In a healthy blood vessel, the blood moves smoothly through the vessel and puts normal pressure on the vessel walls.       High blood pressure occurs when blood pushes too hard against artery walls. This causes damage to the artery walls and then the formation of scar tissue as it heals. This makes the arteries stiff and weak. Plaque sticks to the scarred tissue narrowing and hardening the arteries. High blood pressure also causes your heart to work harder to get blood out to the body. High blood pressure raises your risk of heart attack, also known as acute myocardial infarction, or AMI, and stroke. It can also lead to kidney disease, and blindness.  Measuring blood pressure  An example of a blood pressure measurement is 120/70 (120 over 70). The top number is the pressure of blood against the artery walls during a heartbeat (systolic). The bottom number is the pressure of blood against artery walls between heartbeats (diastolic). Talk with your healthcare provider to find out what your blood pressure goals should be.   Controlling blood pressure  If your blood pressure is too high, work with your doctor on a plan for lowering it. Below are steps you can take that will help lower your blood pressure.  · Choose heart-healthy foods. Eating healthier meals helps you control your blood pressure. Ask your doctor about the DASH eating plan. This plan helps reduce blood pressure by limiting the amount of sodium (salt) you have in your diet. DASH also encourages eating plenty of fruits and vegetables, low-fat or non-fat dairy, whole-grains, and foods high in fiber, and low in fat.  · Reduce sodium. Reducing sodium in your diet reduces fluid  "retention. Fluid retention caused by too much salt increases blood volume and blood pressure. The American Heart Associations (AHA) "ideal" sodium intake recommendation is 1,500 milligrams per day.  However, since American's eat so much salt, the AHA says a positive change can occur by cutting back to even 2,400 milligrams of sodium a day.  · Maintain a healthy weight. Being overweight makes you more likely to have high blood pressure. Losing excess weight helps lower blood pressure.  · Exercise regularly. Daily exercise helps your heart and blood vessels work better and stay healthier. It can help lower your blood pressure.  · Stop smoking. Smoking increases blood pressure and damages blood vessels.  · Limit alcohol. Drinking too much alcohol can raise blood pressure. Men should have no more than 2 drinks a day. Women should have no more than 1. (A drink is equal to 1 beer, or a small glass of wine, or a shot of liquor.)  · Control stress. Stress makes your heart work harder and beat faster. Controlling stress helps you control your blood pressure.  Facts about high blood pressure  · Feeling OK does not mean that blood pressure is under control. Likewise, feeling bad doesnt mean its out of control. The only way to know for sure is to check your pressure regularly.  · Medicine is only one part of controlling high blood pressure. You also need to manage your weight, get regular exercise, and adjust your eating habits.  · High blood pressure is usually a lifelong problem. But it can be controlled with healthy lifestyle changes and medicine.  · Hypertension is not the same as stress. Although stress may be a factor in high blood pressure, its only one part of the story.  · Blood pressure medicines need to be taken every day. Stopping suddenly may cause a dangerous increase in pressure.   Date Last Reviewed: 4/27/2016  © 9375-2850 [employer]. [address] [place] [address], [place] [address]. All rights " reserved. This information is not intended as a substitute for professional medical care. Always follow your healthcare professional's instructions.        Low-Salt Choices  Eating salt (sodium) can make your body retain too much water. Excess water makes your heart work harder. Canned, packaged, and frozen foods are easy to prepare, but they are often high in sodium. Here are some ideas for low-salt foods you can easily prepare yourself.    For breakfast  · Fruit or 100% fruit juice  · Whole-wheat bread or an English muffin. Compare sodium content on labels.  · Low-fat milk or yogurt  · Unsalted eggs  · Shredded wheat  · Corn tortillas  · Unsalted steamed rice  · Regular (not instant) hot cereal, made without salt  Stay away from:  · Sausage, bo, and ham  · Flour tortillas  · Packaged muffins, pancakes, and biscuits  · Instant hot cereals  · Cottage cheese  For lunch and dinner  · Fresh fish, chicken, turkey, or meat--baked, broiled, or roasted without salt  · Dry beans, cooked without salt  · Tofu, stir-fried without salt  · Unsalted fresh fruit and vegetables, or frozen or canned fruit and vegetables with no added salt  Stay away from:  · Lunch or deli meat that is cured or smoked  · Cheese  · Tomato juice and catsup  · Canned vegetables, soups, and fish not labeled as no-salt-added or reduced sodium  · Packaged gravies and sauces  · Olives, pickles, and relish  · Bottled salad dressings  For snacks and desserts  · Yogurt  · Unsalted, air popped popcorn  · Unsalted nuts or seeds  Stay away from:  · Pies and cakes  · Packaged dessert mixes  · Pizza  · Canned and packaged puddings  · Pretzels, chips, crackers, and nuts--unless the label says unsalted  Date Last Reviewed: 6/17/2015  © 7904-3046 [employer]. [address] [place] [address], [place] [address]. All rights reserved. This information is not intended as a substitute for professional medical care. Always follow your healthcare professional's  instructions.        What is High Blood Pressure?  High blood pressure (also called hypertension) is known as the silent killer. This is because most of the time it doesnt cause symptoms. In fact, many people dont know they have it until other problems develop. In most cases, high blood pressure cant be cured. Its a disease that often requires lifelong treatment. The good news is that it can be managed.  Understanding blood pressure  The circulatory system is made up of the heart and blood vessels that carry blood through the body. Your heart is the pump for this system. With each heartbeat (contraction), the heart sends blood out through large blood vessels called arteries. Blood pressure is a measure of how hard the moving blood pushes against the walls of the arteries.  High blood pressure can harm your health  In a healthy blood vessel, the blood moves smoothly through the vessel and puts normal pressure on the vessel walls.       High blood pressure occurs when blood pushes too hard against artery walls. This causes damage to the artery walls and then the formation of scar tissue as it heals. This makes the arteries stiff and weak. Plaque sticks to the scarred tissue narrowing and hardening the arteries. High blood pressure also causes your heart to work harder to get blood out to the body. High blood pressure raises your risk of heart attack, also known as acute myocardial infarction, or AMI, and stroke. It can also lead to kidney disease, and blindness.  Measuring blood pressure  An example of a blood pressure measurement is 120/70 (120 over 70). The top number is the pressure of blood against the artery walls during a heartbeat (systolic). The bottom number is the pressure of blood against artery walls between heartbeats (diastolic). Talk with your healthcare provider to find out what your blood pressure goals should be.   Controlling blood pressure  If your blood pressure is too high, work with your  "doctor on a plan for lowering it. Below are steps you can take that will help lower your blood pressure.  · Choose heart-healthy foods. Eating healthier meals helps you control your blood pressure. Ask your doctor about the DASH eating plan. This plan helps reduce blood pressure by limiting the amount of sodium (salt) you have in your diet. DASH also encourages eating plenty of fruits and vegetables, low-fat or non-fat dairy, whole-grains, and foods high in fiber, and low in fat.  · Reduce sodium. Reducing sodium in your diet reduces fluid retention. Fluid retention caused by too much salt increases blood volume and blood pressure. The American Heart Associations (AHA) "ideal" sodium intake recommendation is 1,500 milligrams per day.  However, since American's eat so much salt, the AHA says a positive change can occur by cutting back to even 2,400 milligrams of sodium a day.  · Maintain a healthy weight. Being overweight makes you more likely to have high blood pressure. Losing excess weight helps lower blood pressure.  · Exercise regularly. Daily exercise helps your heart and blood vessels work better and stay healthier. It can help lower your blood pressure.  · Stop smoking. Smoking increases blood pressure and damages blood vessels.  · Limit alcohol. Drinking too much alcohol can raise blood pressure. Men should have no more than 2 drinks a day. Women should have no more than 1. (A drink is equal to 1 beer, or a small glass of wine, or a shot of liquor.)  · Control stress. Stress makes your heart work harder and beat faster. Controlling stress helps you control your blood pressure.  Facts about high blood pressure  · Feeling OK does not mean that blood pressure is under control. Likewise, feeling bad doesnt mean its out of control. The only way to know for sure is to check your pressure regularly.  · Medicine is only one part of controlling high blood pressure. You also need to manage your weight, get regular " exercise, and adjust your eating habits.  · High blood pressure is usually a lifelong problem. But it can be controlled with healthy lifestyle changes and medicine.  · Hypertension is not the same as stress. Although stress may be a factor in high blood pressure, its only one part of the story.  · Blood pressure medicines need to be taken every day. Stopping suddenly may cause a dangerous increase in pressure.   Date Last Reviewed: 4/27/2016  © 6392-8388 [employer]. [address] [place] [address], [place] [address]. All rights reserved. This information is not intended as a substitute for professional medical care. Always follow your healthcare professional's instructions.        Eating Heart-Healthy Food: Using the DASH Plan    Eating for your heart doesnt have to be hard or boring. You just need to know how to make healthier choices. The DASH eating plan has been developed to help you do just that. DASH stands for Dietary Approaches to Stop Hypertension. It is a plan that has been proven to be healthier for your heart and to lower your risk for high blood pressure. It can also help lower your risk for cancer, heart disease, osteoporosis, and diabetes.  Choosing from each food group  Choose foods from each of the food groups below each day. Try to get the recommended number of servings for each food group. The serving numbers are based on a diet of 2,000 calories a day. Talk to your doctor if youre unsure about your calorie needs. Along with getting the correct servings, the DASH plan also recommends a sodium intake less than 2,300 mg per day.        Grains  Servings: 6 to 8 a day  A serving is:  · 1 slice bread  · 1 ounce dry cereal  · Half a cup cooked rice, pasta or cereal  Best choices: Whole grains and any grains high in fiber. Vegetables  Servings: 4 to 5 a day  A serving is:  · 1 cup raw leafy vegetable  · Half a cup cut-up raw or cooked vegetable  · Half a cup vegetable juice  Best choices: Fresh  or frozen vegetables prepared without added salt or fat.   Fruits  Servings: 4 to 5 a day  A serving is:  · 1 medium fruit  · One-quarter cup dried fruit  · Half a cup fresh, frozen, or canned fruit  · Half a cup of 100% fruit juices  Best choices: A variety of fresh fruits of different colors. Whole fruits are a better choice than fruit juices. Low-fat or fat-free dairy  Servings: 2 to 3 a day  A serving is:  · 1 cup milk  · 1 cup yogurt  · One and a half ounces cheese  Best choices: Skim or 1% milk, low-fat or fat-free yogurt or buttermilk, and low-fat cheeses.         Lean meats, poultry, fish  Servings: 6 or fewer a day  A serving is:  · 1 ounce cooked meats, poultry, or fish  · 1 egg  Best choices: Lean poultry and fish. Trim away visible fat. Broil, grill, roast, or boil instead of frying. Remove skin from poultry before eating. Limit how much red meat you eat.  Nuts, seeds, beans  Servings: 4 to 5 a week  A serving is:  · One-third cup nuts (one and a half ounces)  · 2 tablespoons nut butter or seeds  · Half a cup cooked dry beans or legumes  Best choices: Dry roasted nuts with no salt added, lentils, kidney beans, garbanzo beans, and whole ruff beans.   Fats and oils  Servings: 2 to 3 a day  A serving is:  · 1 teaspoon vegetable oil  · 1 teaspoon soft margarine  · 1 tablespoon mayonnaise  · 2 tablespoons salad dressing  Best choices: Nut and vegetable oils (nontropical vegetable oils), such as olive and canola oil. Sweets  Servings: 5 a week or fewer  A serving is:  · 1 tablespoon sugar, maple syrup, or honey  · 1 tablespoon jam or jelly  · 1 half-ounce jelly beans (about 15)  · 1 cup lemonade  Best choices: Dried fruit can be a satisfying sweet. Choose low-fat sweets. And watch your serving sizes!      For more on the DASH eating plan, visit:  www.nhlbi.nih.gov/health/health-topics/topics/dash   Date Last Reviewed: 6/1/2016 © 2000-2017 [employer]. [address] [place] [address], [place] [address].  All rights reserved. This information is not intended as a substitute for professional medical care. Always follow your healthcare professional's instructions.        Understanding the Link Between High Blood Pressure and Stroke  Each day that your blood pressure is too high, your chances of having a stroke are increased. Normal blood pressure is considered to be less than 120 over less than 80 millimeters of mercury (mmHg) or 120/80 mmHg. A stroke is a loss of brain function caused by a lack of blood to the brain. Stroke can result from the damage that ongoing high blood pressure causes in your vessels. If the affected vessel stops supplying blood to the brain, a stroke results.  High blood pressure damages blood vessels    Vessels thicken  When blood presses against a vessel wall with too much force, muscles in the wall lose their ability to stretch. This causes the wall to thicken, which narrows the vessel passage and reduces blood flow.   Clots form  When blood pressure is too high, it can damage blood vessel walls which results in scar tissue. Fat and cholesterol (plaque) collect in the damaged spots. Blood cells stick to the plaque, forming a mass called a clot. A clot can block blood flow in the vessel.   Vessels break  Sometimes blood flows with enough force to weaken a vessel wall. If the vessel is small or damaged, the wall can break. When this happens, blood leaks into nearby tissue and kills cells. Other cells may die because blood cannot reach them.   Know the symptoms of stroke  During a stroke, blood supply to the brain is cut off. But with prompt medical help, a better recovery is more likely. Think of a stroke as a brain attack. Dont wait. Call 911 if you have any of the symptoms below:  · Sudden weakness or numbness on one side of the face or body, including a leg or an arm  · Sudden trouble seeing with one or both eyes  · Sudden double vision  · Sudden trouble talking, such as slurred  speech  · Sudden severe headache  · Sudden problems using or understanding words  · Sudden dizziness or loss of balance  · Seizures for the first time   · Any of these symptoms that occur and then resolve    Date Last Reviewed: 6/13/2015  © 3268-4263 [employer]. [address] [place] [address], [place] [address]. All rights reserved. This information is not intended as a substitute for professional medical care. Always follow your healthcare professional's instructions.

## 2018-03-26 NOTE — PROGRESS NOTES
Summary  3/19/18  Pt states she is feeling much better over all.  She states that dr. Area office called her last week and advised her to take the full tablet of metoprolol.  Medication record was update last week but does not include the most recent change to 1 tablet.  I      Interventions  Pneumonia  States cough not present anymore.  She states she is getting stronger every day.  She drove her self for the first time in 3 weeks and was glad that she is not stuck in the house.    New care plan created    Hypertension- reviewed blood pressure log am pressures are 154/70 - 166/82.  She states that after she takes her medications it goes to 138/70 - 140/80.  Advised that the am pressure are still a little high.  Reinforced recommendation to take medications at same time of day and she verbalized understanding.  Indicated that if the am pressures continue on same numbers we will let cardiology and dr. [facility] know and she may need more medication adjustment.  Discussed low sodium diet.  Pt admits that she eats out a lot but she does try to watch what she gets.  Reviewed foods high in sodium.    plan  Education on hypertension and treatment  Education on pneumonia and prevention  Education on coughing and deep breathing exercises

## 2018-03-26 NOTE — TELEPHONE ENCOUNTER
----- Message from Jenny Boudreaux sent at 3/26/2018  9:54 AM CDT -----  Contact: bernard with [facility] [phone]  Pharmacy is calling to clarify an RX.  RX name:  labetalol (NORMODYNE) 100 MG tablet  What do they need to clarify:  Usually 1 and one half daily , new rx says take one half daily   Comments:

## 2018-03-26 NOTE — TELEPHONE ENCOUNTER
Recent changes to dose of Labetalol. Is patient to take a total of 100 mg twice daily? Please advise.

## 2018-03-26 NOTE — TELEPHONE ENCOUNTER
Dose should be 1/2 tablet twice daily.  Pt was seen by Cardiology after her appointment with me.  She should check with Cardiology to make sure her dose was not changed, thanks

## 2018-03-28 ENCOUNTER — TELEPHONE (OUTPATIENT)
Dept: FAMILY MEDICINE | Facility: CLINIC | Age: 83
End: 2018-03-28

## 2018-03-28 NOTE — TELEPHONE ENCOUNTER
Informed that form was received and is on provider's desk. Once completed will be faxed. Verbalizes understanding.

## 2018-03-28 NOTE — TELEPHONE ENCOUNTER
----- Message from Jenny Boudreaux sent at 3/28/2018 12:48 PM CDT -----  Contact: shirley  with [facility]  [phone]  Calling to find out if you received a fax from them and to get status on completion of form

## 2018-04-03 ENCOUNTER — OUTPATIENT CASE MANAGEMENT (OUTPATIENT)
Dept: ADMINISTRATIVE | Facility: OTHER | Age: 83
End: 2018-04-03

## 2018-04-03 NOTE — PATIENT INSTRUCTIONS
Eating Heart-Healthy Food: Using the DASH Plan    Eating for your heart doesnt have to be hard or boring. You just need to know how to make healthier choices. The DASH eating plan has been developed to help you do just that. DASH stands for Dietary Approaches to Stop Hypertension. It is a plan that has been proven to be healthier for your heart and to lower your risk for high blood pressure. It can also help lower your risk for cancer, heart disease, osteoporosis, and diabetes.  Choosing from each food group  Choose foods from each of the food groups below each day. Try to get the recommended number of servings for each food group. The serving numbers are based on a diet of 2,000 calories a day. Talk to your doctor if youre unsure about your calorie needs. Along with getting the correct servings, the DASH plan also recommends a sodium intake less than 2,300 mg per day.        Grains  Servings: 6 to 8 a day  A serving is:  · 1 slice bread  · 1 ounce dry cereal  · Half a cup cooked rice, pasta or cereal  Best choices: Whole grains and any grains high in fiber. Vegetables  Servings: 4 to 5 a day  A serving is:  · 1 cup raw leafy vegetable  · Half a cup cut-up raw or cooked vegetable  · Half a cup vegetable juice  Best choices: Fresh or frozen vegetables prepared without added salt or fat.   Fruits  Servings: 4 to 5 a day  A serving is:  · 1 medium fruit  · One-quarter cup dried fruit  · Half a cup fresh, frozen, or canned fruit  · Half a cup of 100% fruit juices  Best choices: A variety of fresh fruits of different colors. Whole fruits are a better choice than fruit juices. Low-fat or fat-free dairy  Servings: 2 to 3 a day  A serving is:  · 1 cup milk  · 1 cup yogurt  · One and a half ounces cheese  Best choices: Skim or 1% milk, low-fat or fat-free yogurt or buttermilk, and low-fat cheeses.         Lean meats, poultry, fish  Servings: 6 or fewer a day  A serving is:  · 1 ounce cooked meats, poultry, or fish  · 1  egg  Best choices: Lean poultry and fish. Trim away visible fat. Broil, grill, roast, or boil instead of frying. Remove skin from poultry before eating. Limit how much red meat you eat.  Nuts, seeds, beans  Servings: 4 to 5 a week  A serving is:  · One-third cup nuts (one and a half ounces)  · 2 tablespoons nut butter or seeds  · Half a cup cooked dry beans or legumes  Best choices: Dry roasted nuts with no salt added, lentils, kidney beans, garbanzo beans, and whole ruff beans.   Fats and oils  Servings: 2 to 3 a day  A serving is:  · 1 teaspoon vegetable oil  · 1 teaspoon soft margarine  · 1 tablespoon mayonnaise  · 2 tablespoons salad dressing  Best choices: Nut and vegetable oils (nontropical vegetable oils), such as olive and canola oil. Sweets  Servings: 5 a week or fewer  A serving is:  · 1 tablespoon sugar, maple syrup, or honey  · 1 tablespoon jam or jelly  · 1 half-ounce jelly beans (about 15)  · 1 cup lemonade  Best choices: Dried fruit can be a satisfying sweet. Choose low-fat sweets. And watch your serving sizes!      For more on the DASH eating plan, visit:  www.nhlbi.nih.gov/health/health-topics/topics/dash   Date Last Reviewed: 6/1/2016  © 3513-5768 [employer]. [address] [place] [address], [place] [address]. All rights reserved. This information is not intended as a substitute for professional medical care. Always follow your healthcare professional's instructions.        Low-Salt Choices  Eating salt (sodium) can make your body retain too much water. Excess water makes your heart work harder. Canned, packaged, and frozen foods are easy to prepare, but they are often high in sodium. Here are some ideas for low-salt foods you can easily prepare yourself.    For breakfast  · Fruit or 100% fruit juice  · Whole-wheat bread or an English muffin. Compare sodium content on labels.  · Low-fat milk or yogurt  · Unsalted eggs  · Shredded wheat  · Corn tortillas  · Unsalted steamed rice  · Regular  (not instant) hot cereal, made without salt  Stay away from:  · Sausage, bo, and ham  · Flour tortillas  · Packaged muffins, pancakes, and biscuits  · Instant hot cereals  · Cottage cheese  For lunch and dinner  · Fresh fish, chicken, turkey, or meat--baked, broiled, or roasted without salt  · Dry beans, cooked without salt  · Tofu, stir-fried without salt  · Unsalted fresh fruit and vegetables, or frozen or canned fruit and vegetables with no added salt  Stay away from:  · Lunch or deli meat that is cured or smoked  · Cheese  · Tomato juice and catsup  · Canned vegetables, soups, and fish not labeled as no-salt-added or reduced sodium  · Packaged gravies and sauces  · Olives, pickles, and relish  · Bottled salad dressings  For snacks and desserts  · Yogurt  · Unsalted, air popped popcorn  · Unsalted nuts or seeds  Stay away from:  · Pies and cakes  · Packaged dessert mixes  · Pizza  · Canned and packaged puddings  · Pretzels, chips, crackers, and nuts--unless the label says unsalted  Date Last Reviewed: 6/17/2015  © 5860-1527 [employer]. [address] [place] [address]. All rights reserved. This information is not intended as a substitute for professional medical care. Always follow your healthcare professional's instructions.        Reading Food Labels  Look for the Nutrition Facts label on packaged foods. Reading labels is a big step toward eating healthier. The tips below help you know what to look for.    1. Serving size. Read this closely because the package, jar, or can may contain more than 1 serving. This is how to measure 1 serving of the food in the package. If you eat more than 1 serving, you get more of everything on the label -- including fat, cholesterol, and calories.  2. Total fat. This tells you how many grams (g) of fat are in 1 serving. Fat is high in calories. A healthy goal is to have less than 25% of your daily calories come from fat.  3. Saturated fat. This tells  you how much saturated fat is in 1 serving. Saturated fat raises your cholesterol the most. Look for foods that have little or no saturated fat.  4. Trans fat. This tells you how much trans fat is in 1 serving. Even a small amount of trans fat can harm your health. Choose foods that have no trans fat.  5. Cholesterol. This tells you how much cholesterol is in 1 serving. For many years, it had been recommended to eat less than 300 milligrams (mg) of cholesterol a day. New guidelines have removed this limitation as cholesterol has been recently shown to not raise blood cholesterol levels as significantly as previously thought. However, many foods high in cholesterol are also high in saturated fat. It is recommended to limit saturated fat in your diet.  6. Calories from fat. This number tells you how many calories from fat are in 1 serving (there are 9 calories per gram of fat). Look for foods with few calories from fat.  7. % Daily value. The higher the number, the more 1 serving has of that nutrient. Look for foods that have low numbers for total fat, saturated fat, cholesterol, and sodium.  8. Sodium. This tells you how much sodium (salt) is in 1 serving. Choose foods with low numbers for sodium.  9. Dietary fiber. This number tells you how much fiber is in 1 serving. Foods that are high in fiber can help you feel full. They can also be good for your heart and digestion. The recommended daily amount of fiber is 25 grams for women and 38 grams for men. After age 50, your daily fiber needs drop to 21 grams for women and 30 grams for men.  Date Last Reviewed: 5/11/2015  © 0746-2297 [employer]. [address] [place] [address], [place] [address]. All rights reserved. This information is not intended as a substitute for professional medical care. Always follow your healthcare professional's instructions.

## 2018-04-05 ENCOUNTER — TELEPHONE (OUTPATIENT)
Dept: RHEUMATOLOGY | Facility: CLINIC | Age: 83
End: 2018-04-05

## 2018-04-09 ENCOUNTER — TELEPHONE (OUTPATIENT)
Dept: ORTHOPEDICS | Facility: CLINIC | Age: 83
End: 2018-04-09

## 2018-04-09 NOTE — TELEPHONE ENCOUNTER
----- Message from Elena Salazar sent at 4/9/2018  9:57 AM CDT -----  Contact: Pt  Pt states she is experiencing right knee pain would like to know if she can be seen today?    Pt contact number [phone]  Thanks

## 2018-04-11 ENCOUNTER — OFFICE VISIT (OUTPATIENT)
Dept: FAMILY MEDICINE | Facility: CLINIC | Age: 83
End: 2018-04-11
Payer: MEDICARE

## 2018-04-11 ENCOUNTER — TELEPHONE (OUTPATIENT)
Dept: FAMILY MEDICINE | Facility: CLINIC | Age: 83
End: 2018-04-11

## 2018-04-11 ENCOUNTER — OFFICE VISIT (OUTPATIENT)
Dept: ORTHOPEDICS | Facility: CLINIC | Age: 83
End: 2018-04-11
Payer: MEDICARE

## 2018-04-11 ENCOUNTER — HOSPITAL ENCOUNTER (OUTPATIENT)
Dept: RADIOLOGY | Facility: HOSPITAL | Age: 83
Discharge: HOME | End: 2018-04-11
Attending: FAMILY MEDICINE
Payer: MEDICARE

## 2018-04-11 VITALS — BODY MASS INDEX: 24.61 KG/M2 | HEIGHT: 63 IN | WEIGHT: 138.88 LBS

## 2018-04-11 VITALS
WEIGHT: 142.19 LBS | BODY MASS INDEX: 25.2 KG/M2 | SYSTOLIC BLOOD PRESSURE: 142 MMHG | TEMPERATURE: 98 F | HEIGHT: 63 IN | HEART RATE: 56 BPM | DIASTOLIC BLOOD PRESSURE: 62 MMHG

## 2018-04-11 DIAGNOSIS — M17.11 PRIMARY OSTEOARTHRITIS OF RIGHT KNEE: Primary | ICD-10-CM

## 2018-04-11 DIAGNOSIS — J18.9 PNEUMONIA OF RIGHT LOWER LOBE DUE TO INFECTIOUS ORGANISM: Primary | ICD-10-CM

## 2018-04-11 DIAGNOSIS — J18.9 PNEUMONIA OF RIGHT LOWER LOBE DUE TO INFECTIOUS ORGANISM: ICD-10-CM

## 2018-04-11 DIAGNOSIS — I10 ESSENTIAL HYPERTENSION: ICD-10-CM

## 2018-04-11 PROCEDURE — 20610 DRAIN/INJ JOINT/BURSA W/O US: CPT | Mod: S$PBB,RT,, | Performed by: PHYSICIAN ASSISTANT

## 2018-04-11 PROCEDURE — 99999 PR PBB SHADOW E&M-EST. PATIENT-LVL III: CPT | Mod: PBBFAC,,, | Performed by: PHYSICIAN ASSISTANT

## 2018-04-11 PROCEDURE — 71046 X-RAY EXAM CHEST 2 VIEWS: CPT | Mod: TC,FY,PO

## 2018-04-11 PROCEDURE — 99214 OFFICE O/P EST MOD 30 MIN: CPT | Mod: S$PBB,,, | Performed by: FAMILY MEDICINE

## 2018-04-11 PROCEDURE — 20610 DRAIN/INJ JOINT/BURSA W/O US: CPT | Mod: PBBFAC | Performed by: PHYSICIAN ASSISTANT

## 2018-04-11 PROCEDURE — 99999 PR PBB SHADOW E&M-EST. PATIENT-LVL III: CPT | Mod: PBBFAC,,, | Performed by: FAMILY MEDICINE

## 2018-04-11 PROCEDURE — 99213 OFFICE O/P EST LOW 20 MIN: CPT | Mod: PBBFAC,25,27 | Performed by: PHYSICIAN ASSISTANT

## 2018-04-11 PROCEDURE — 99213 OFFICE O/P EST LOW 20 MIN: CPT | Mod: PBBFAC,25,PO | Performed by: FAMILY MEDICINE

## 2018-04-11 PROCEDURE — 71046 X-RAY EXAM CHEST 2 VIEWS: CPT | Mod: 26,,, | Performed by: RADIOLOGY

## 2018-04-11 PROCEDURE — 99213 OFFICE O/P EST LOW 20 MIN: CPT | Mod: 25,S$PBB,, | Performed by: PHYSICIAN ASSISTANT

## 2018-04-11 RX ORDER — LABETALOL 100 MG/1
TABLET, FILM COATED ORAL
Qty: 135 TABLET | Refills: 1 | Status: SHIPPED | OUTPATIENT
Start: 2018-04-11 | End: 2018-07-22 | Stop reason: SDUPTHER

## 2018-04-11 RX ORDER — HYALURONATE SODIUM 20 MG/2 ML
2 SYRINGE (ML) INTRAARTICULAR WEEKLY
Status: COMPLETED | OUTPATIENT
Start: 2018-04-11 | End: 2018-04-25

## 2018-04-11 RX ADMIN — Medication 20 MG: at 01:04

## 2018-04-11 NOTE — PROGRESS NOTES
Subjective:       Patient ID: Gerda Lai is a 85 y.o. female.    Chief Complaint: Hypertension    Disclaimer: This note has been generated using voice-recognition software. There may be typographical errors that have been missed during proof-reading    86 yo presents for follow up of right lower lobe pneumonia and hypertension.  She is doing well, no fever or chills.  Has noted an occasional dry cough without chest discomfort  On prior exam, pt was hypotensive and dosage of Labetalol was decreased to 1/2 tablet twice daily.  She has noted BP to be elevated with systolics in 140s range      Hypertension   Pertinent negatives include no chest pain, palpitations or shortness of breath.     Review of Systems   Constitutional: Negative for activity change, chills, fever and unexpected weight change.   HENT: Negative for congestion, ear pain, hearing loss and sinus pain.    Respiratory: Positive for cough. Negative for shortness of breath and wheezing.    Cardiovascular: Negative for chest pain, palpitations and leg swelling.       Objective:      Physical Exam   Constitutional: She appears well-developed and well-nourished. No distress.   HENT:   Right Ear: Tympanic membrane and ear canal normal.   Left Ear: Tympanic membrane and ear canal normal.   Mouth/Throat: Uvula is midline, oropharynx is clear and moist and mucous membranes are normal.   Neck: Normal range of motion. No JVD present. No thyromegaly present.   Cardiovascular: Normal rate and regular rhythm.    No murmur heard.  Pulmonary/Chest: Effort normal and breath sounds normal. She has no wheezes. She has no rales.   Lymphadenopathy:     She has no cervical adenopathy.       Assessment:       1. Pneumonia of right lower lobe due to infectious organism    2. Essential hypertension        Plan:   1.  CXR done todays still shows infiltrate, but seems smaller than on previous films  2.  Increase Labetalol  3.  Pending Radiology reading, repeat CXR in another  month or consider CT if infiltrate unchanged

## 2018-04-11 NOTE — PROGRESS NOTES
Subjective:      Patient ID: Gerda Lai is a 85 y.o. female.    Chief Complaint: No chief complaint on file.    HPI  85 year old female presents with chief complaint of right knee pain x couple of weeks. Pain is worse with prolonged sitting. She reports swelling. She received bilateral cortisone injection in March from Dr. [facility]. There has been no trauma. She is ambulating with a cane.   Review of Systems   Constitution: Negative for chills, fever and night sweats.   Cardiovascular: Negative for chest pain.   Respiratory: Negative for cough and shortness of breath.    Hematologic/Lymphatic: Does not bruise/bleed easily.   Skin: Negative for color change.   Gastrointestinal: Negative for heartburn.   Genitourinary: Negative for dysuria.   Neurological: Negative for numbness and paresthesias.   Psychiatric/Behavioral: Negative for altered mental status.   Allergic/Immunologic: Negative for persistent infections.         Objective:            General    Vitals reviewed.  Constitutional: She is oriented to person, place, and time. She appears well-developed and well-nourished.   Cardiovascular: Normal rate.    Neurological: She is alert and oriented to person, place, and time.             Right Knee Exam:  ROM 5-120 degrees  Small effusion  +crepitus  TTP medial and lateral joint line      X-ray: reviewed by myself. DJD both knees.       Assessment:       Encounter Diagnosis   Name Primary?    Primary osteoarthritis of right knee Yes          Plan:       Discussed treatment options with patient. Attempted knee aspiration but no fluid was obtained. She wishes to start euflexxa injections. First injection given. Ice and elevate knee. HEP given. RTC in 1 week for second injection.     PROCEDURE:  I have explained the risks, benefits, and alternatives of the procedure in detail.  The patient voices understanding and all questions have been answered.  The patient agrees to proceed as planned. So after I performed a  sterile prep of the skin in the normal fashion and the right knee is aspirated from the suprior lateral approach utilizing an 18 gauge needle of 0cc and then injected with 2 cc euflexxa.  The patient is cautioned and immediate relief of pain is secondary to the local anesthetic and will be temporary.  After the anesthetic wears off there may be a increase in pain that may last for a few hours or a few days and they should use ice to help alleviate this flair up of pain.

## 2018-04-17 ENCOUNTER — OUTPATIENT CASE MANAGEMENT (OUTPATIENT)
Dept: ADMINISTRATIVE | Facility: OTHER | Age: 83
End: 2018-04-17

## 2018-04-17 NOTE — PROGRESS NOTES
Summary  4/17/18  Summary-  Chart reviewed in epic.  Pt to ortho for knee injection.  States this is holding up her ability to attend the exercises at the gym.          Interventions  Pneumonia  Resolved goals met  Pt had  with dr. Sellers and there is still sm area of consolidation on r base.  He states he wants to see pt in 1 month to reeval and if no change may consider ct eval.  She states he did tell her and she verbalized understanding.  We discussed coughing and deep breathing exercises.  Pt states she occasionally has cough in the morning and she will cough up thick clear secretion.  Advised if it changes color to notify md.  We discussed how to c and db and the advantages to open up the base of the lung.  She states she will try at least 2 times a day.      New care plan created    Hypertension- reviewed blood pressure log am pressures are 134/70 -180's over 80.  Pt went to dr. Area and he increased her normadyne to 1.5 tab 2 times a day.  Blood pressure still running high.  States she wants to give it a few more days and she will call him to schedule her follow up and discuss the blood pressure range.  She states she is watching sodium intake .   States she got the information I sent her in the mail and she is reading labels and trying to limit intake.  Advised on the [employer] preservative free products and where to purchase them.  She states she will try.    plan  Hypertension Education on hypertension and treatment                          Educate on daily recommended sodium intake                        Review kaitlin information

## 2018-04-18 ENCOUNTER — OFFICE VISIT (OUTPATIENT)
Dept: ORTHOPEDICS | Facility: CLINIC | Age: 83
End: 2018-04-18
Payer: MEDICARE

## 2018-04-18 VITALS — WEIGHT: 138.88 LBS | HEIGHT: 63 IN | BODY MASS INDEX: 24.61 KG/M2

## 2018-04-18 DIAGNOSIS — M17.11 PRIMARY OSTEOARTHRITIS OF RIGHT KNEE: Primary | ICD-10-CM

## 2018-04-18 PROCEDURE — 99499 UNLISTED E&M SERVICE: CPT | Mod: S$PBB,,, | Performed by: PHYSICIAN ASSISTANT

## 2018-04-18 PROCEDURE — 20610 DRAIN/INJ JOINT/BURSA W/O US: CPT | Mod: PBBFAC | Performed by: PHYSICIAN ASSISTANT

## 2018-04-18 PROCEDURE — 99213 OFFICE O/P EST LOW 20 MIN: CPT | Mod: PBBFAC | Performed by: PHYSICIAN ASSISTANT

## 2018-04-18 PROCEDURE — 20610 DRAIN/INJ JOINT/BURSA W/O US: CPT | Mod: S$PBB,RT,, | Performed by: PHYSICIAN ASSISTANT

## 2018-04-18 PROCEDURE — 99999 PR PBB SHADOW E&M-EST. PATIENT-LVL III: CPT | Mod: PBBFAC,,, | Performed by: PHYSICIAN ASSISTANT

## 2018-04-18 RX ADMIN — Medication 20 MG: at 12:04

## 2018-04-18 NOTE — PROGRESS NOTES
Subjective:      Patient ID: Gerda Lai is a 85 y.o. female.    Chief Complaint: No chief complaint on file.    HPI  Patient returns for the second of three. The patient tolerated the last injection well. The patient reports the Euflexxa injection in the right knee(s) has brought about no improvement..    Review of Systems   Constitution: Negative for chills, fever and night sweats.   Cardiovascular: Negative for chest pain.   Respiratory: Negative for cough and shortness of breath.    Hematologic/Lymphatic: Does not bruise/bleed easily.   Skin: Negative for color change.   Gastrointestinal: Negative for heartburn.   Genitourinary: Negative for dysuria.   Neurological: Negative for numbness and paresthesias.   Psychiatric/Behavioral: Negative for altered mental status.   Allergic/Immunologic: Negative for persistent infections.         Objective:            Ortho/SPM Exam  The right knee is examined, there is mild evidence of swelling or effusion.  There is no evidence of erythema. Skin, pulses, sensation are intact.            Assessment:       Encounter Diagnosis   Name Primary?    Primary osteoarthritis of right knee Yes          Plan:       PROCEDURE:  I have explained the risks, benefits, and alternatives of the procedure in detail.  The patient voices understanding and all questions have been answered.  The patient agrees to proceed as planned. So after I performed a sterile prep of the skin in the normal fashion the right knee is injected using a 22 gauge needle from the anteromedial approach with 2cc of euflexxa solution. The patient is reminded that it can take 6 - 8 weeks to see all the affects of this treatment, they must complete all three injections to see all the affects and the treatment can not be repeated any earlier than six months.

## 2018-04-23 ENCOUNTER — TELEPHONE (OUTPATIENT)
Dept: FAMILY MEDICINE | Facility: CLINIC | Age: 83
End: 2018-04-23

## 2018-04-23 DIAGNOSIS — I10 HYPERTENSION, UNSPECIFIED TYPE: Primary | ICD-10-CM

## 2018-04-23 RX ORDER — AMLODIPINE BESYLATE 5 MG/1
5 TABLET ORAL DAILY
Qty: 30 TABLET | Refills: 11 | Status: SHIPPED | OUTPATIENT
Start: 2018-04-23 | End: 2018-05-14

## 2018-04-23 NOTE — TELEPHONE ENCOUNTER
Message left on voicemail informing patient that Dr. Sellers called in a new Rx for amlodipine and to contact office with any questions.

## 2018-04-23 NOTE — TELEPHONE ENCOUNTER
Patient reports elevated blood pressure x 1 week. Reports blood pressures of 170's/60-90's with dizziness. Blood pressure 193/60 this morning.. Denies dizziness, lightheadedness or headache this morning. Agreed to schedule appointment tomorrow with another provider.

## 2018-04-23 NOTE — TELEPHONE ENCOUNTER
----- Message from Linda Bernal sent at 4/23/2018  8:49 AM CDT -----  Contact: patient [phone]  Pt called with c/o elevated bp readings and refused an appt with anyone else. Pt was in the hospital recently and bp medication was changed and pt doesn't feel it is working. Patient would like to speak with you or have you fit her in soon as her readings have been elevated x 1 week.

## 2018-04-24 ENCOUNTER — OFFICE VISIT (OUTPATIENT)
Dept: FAMILY MEDICINE | Facility: CLINIC | Age: 83
End: 2018-04-24
Payer: MEDICARE

## 2018-04-24 VITALS
TEMPERATURE: 98 F | WEIGHT: 142.44 LBS | BODY MASS INDEX: 25.24 KG/M2 | HEIGHT: 63 IN | SYSTOLIC BLOOD PRESSURE: 146 MMHG | RESPIRATION RATE: 16 BRPM | DIASTOLIC BLOOD PRESSURE: 50 MMHG

## 2018-04-24 DIAGNOSIS — I10 HYPERTENSION, UNSPECIFIED TYPE: Primary | ICD-10-CM

## 2018-04-24 PROCEDURE — 99213 OFFICE O/P EST LOW 20 MIN: CPT | Mod: PBBFAC,PO | Performed by: FAMILY MEDICINE

## 2018-04-24 PROCEDURE — 99999 PR PBB SHADOW E&M-EST. PATIENT-LVL III: CPT | Mod: PBBFAC,,, | Performed by: FAMILY MEDICINE

## 2018-04-24 PROCEDURE — 99214 OFFICE O/P EST MOD 30 MIN: CPT | Mod: S$PBB,,, | Performed by: FAMILY MEDICINE

## 2018-04-24 RX ORDER — NEBIVOLOL 20 MG/1
1 TABLET ORAL DAILY
Qty: 30 TABLET | Refills: 6 | Status: SHIPPED | OUTPATIENT
Start: 2018-04-24 | End: 2018-05-12 | Stop reason: SINTOL

## 2018-04-24 NOTE — PROGRESS NOTES
Gerda Lai 85 y.o. in for BP check  well tolerated however patient notices multiple elevations in blood pressures especially in the mornings with systolics running from 150-170 diastolics running from   Patient concerned that her medications are not effective in her pressures going untreated  Past Medical History:   Diagnosis Date    Arthritis     Basal cell carcinoma 09/2016    right post auricular neck     Breast cancer     Cataract     Fibromyalgia     Hyperlipidemia     Hypertension     Personal history of colonic polyps     SCC (squamous cell carcinoma) 2015    R chest    SCC (squamous cell carcinoma) 2016    left medial shoulder    SCC (squamous cell carcinoma) 2017    left knee    Squamous cell carcinoma 2015    right forearm    Thyroid disease     Vaginitis      Patient  reports that she has never smoked. She has never used smokeless tobacco. She reports that she drinks alcohol. She reports that she does not use drugs.  Family History   Problem Relation Age of Onset    Breast cancer Mother     Stroke Paternal Grandfather     Heart disease Father     Cancer Paternal Aunt     Heart disease Paternal Aunt     Glaucoma Paternal Grandmother     Amblyopia Neg Hx     Blindness Neg Hx     Cataracts Neg Hx     Diabetes Neg Hx     Hypertension Neg Hx     Macular degeneration Neg Hx     Retinal detachment Neg Hx     Strabismus Neg Hx     Thyroid disease Neg Hx     Melanoma Neg Hx        Physical Exam:  Vitals:   Vitals:    04/24/18 0932   BP: (!) 146/50   Resp: 16   Temp: 97.6 °F (36.4 °C)     CV:rrr without  Lungs:clear to auscultation, good resp excursion  ABD: normal BS, non-tender, no hepatosplenomegaly  Extremities:no clubbing , cyanosis or edema  Skin: good turgor, no rashes or lesions    Imp: HTN uncontrolled          Ineffective medication           Plan:    Current Outpatient Prescriptions:     acetaminophen (TYLENOL) 650 MG TbSR, Take 650 mg by mouth as needed (pain).  , Disp: , Rfl:     amLODIPine (NORVASC) 5 MG tablet, Take 1 tablet (5 mg total) by mouth once daily., Disp: 30 tablet, Rfl: 11    ascorbic acid (VITAMIN C) 100 MG tablet, Take 100 mg by mouth 2 (two) times daily. , Disp: , Rfl:     B INFANTIS/B ANI/B JOSE/B BIFID (PROBIOTIC 4X ORAL), Take 1 tablet by mouth daily, Disp: , Rfl:     coenzyme Q10 (CO Q-10) 100 mg capsule, Take 100 mg by mouth once daily., Disp: , Rfl:     glucosamine-chondroitin 500-400 mg tablet, Take 1 tablet by mouth once daily. , Disp: , Rfl:     LORazepam (ATIVAN) 0.5 MG tablet, TAKE 1/2 TABLET BY MOUTH TWICE A DAY, Disp: 60 tablet, Rfl: 1    multivitamin capsule, Take 1 capsule by mouth once daily., Disp: , Rfl:     omeprazole (PRILOSEC OTC) 20 MG tablet, Take 20 mg by mouth once daily.  , Disp: , Rfl:     pravastatin (PRAVACHOL) 40 MG tablet, TAKE 1 TABLET (40 MG TOTAL) BY MOUTH ONCE DAILY., Disp: 90 tablet, Rfl: 3    TEKTURNA 300 mg Tab, Take 300 mg by mouth once daily. , Disp: , Rfl:     nebivolol 20 mg Tab, Take 1 tablet by mouth once daily., Disp: 30 tablet, Rfl: 6   Discontinue medications include: labetalol 1-1/2 tablets twice a day  Current Facility-Administered Medications:     EUFLEXXA 10 mg/mL(mw 2.4 -3.6 million) injection Syrg 20 mg, 2 mL, Intra-articular, Weekly, Bianca Hu PA-C, 20 mg at 04/18/18 1223  Diet: 2gm NACL  lifestlye modifications: low impact aerobic exercise  F/u: in 2 weeks with pcp  To remeasure blood pressures and discuss any side effects

## 2018-04-25 ENCOUNTER — OFFICE VISIT (OUTPATIENT)
Dept: ORTHOPEDICS | Facility: CLINIC | Age: 83
End: 2018-04-25
Payer: MEDICARE

## 2018-04-25 VITALS — WEIGHT: 141.56 LBS | HEIGHT: 63 IN | BODY MASS INDEX: 25.08 KG/M2

## 2018-04-25 DIAGNOSIS — M17.11 PRIMARY OSTEOARTHRITIS OF RIGHT KNEE: Primary | ICD-10-CM

## 2018-04-25 PROCEDURE — 20610 DRAIN/INJ JOINT/BURSA W/O US: CPT | Mod: PBBFAC | Performed by: PHYSICIAN ASSISTANT

## 2018-04-25 PROCEDURE — 99499 UNLISTED E&M SERVICE: CPT | Mod: S$PBB,,, | Performed by: PHYSICIAN ASSISTANT

## 2018-04-25 PROCEDURE — 99999 PR PBB SHADOW E&M-EST. PATIENT-LVL III: CPT | Mod: PBBFAC,,, | Performed by: PHYSICIAN ASSISTANT

## 2018-04-25 PROCEDURE — 20610 DRAIN/INJ JOINT/BURSA W/O US: CPT | Mod: S$PBB,RT,, | Performed by: PHYSICIAN ASSISTANT

## 2018-04-25 PROCEDURE — 99213 OFFICE O/P EST LOW 20 MIN: CPT | Mod: PBBFAC | Performed by: PHYSICIAN ASSISTANT

## 2018-04-25 RX ADMIN — Medication 20 MG: at 11:04

## 2018-04-25 NOTE — PROGRESS NOTES
Subjective:      Patient ID: Gerda Lai is a 85 y.o. female.    Chief Complaint: No chief complaint on file.    HPI  Patient returns for the third of three. The patient tolerated the last injection well. The patient reports the Euflexxa injection in the right knee(s) has brought about no improvement..    Review of Systems   Constitution: Negative for chills, fever and night sweats.   Cardiovascular: Negative for chest pain.   Respiratory: Negative for cough and shortness of breath.    Hematologic/Lymphatic: Does not bruise/bleed easily.   Skin: Negative for color change.   Gastrointestinal: Negative for heartburn.   Genitourinary: Negative for dysuria.   Neurological: Negative for numbness and paresthesias.   Psychiatric/Behavioral: Negative for altered mental status.   Allergic/Immunologic: Negative for persistent infections.         Objective:            Ortho/SPM Exam  The right knee is examined, there is mild evidence of swelling or effusion.  There is no evidence of erythema. Skin, pulses, sensation are intact.            Assessment:       Encounter Diagnosis   Name Primary?    Primary osteoarthritis of right knee Yes          Plan:       PROCEDURE:  I have explained the risks, benefits, and alternatives of the procedure in detail.  The patient voices understanding and all questions have been answered.  The patient agrees to proceed as planned. So after I performed a sterile prep of the skin in the normal fashion the right knee is injected using a 22 gauge needle from the anterolateral approach with 2cc of euflexxa solution. The patient is reminded that it can take 6 - 8 weeks to see all the affects of this treatment, they must complete all three injections to see all the affects and the treatment can not be repeated any earlier than six months.

## 2018-04-30 ENCOUNTER — TELEPHONE (OUTPATIENT)
Dept: FAMILY MEDICINE | Facility: CLINIC | Age: 83
End: 2018-04-30

## 2018-04-30 ENCOUNTER — OUTPATIENT CASE MANAGEMENT (OUTPATIENT)
Dept: ADMINISTRATIVE | Facility: OTHER | Age: 83
End: 2018-04-30

## 2018-04-30 NOTE — TELEPHONE ENCOUNTER
----- Message from Kristie Mancuso RN sent at 4/30/2018  2:46 PM CDT -----  Ms. Lai saw dr. Wiley on 4/24/18  for continued hypertension.  She discontinued her labetalol and started her on bystolic 20 mg in the am.  Pt indicates blood pressures in the am are unchanged 186/56 this am.  I advised pt to call and schedule appt with you as she is due for her follow up cxr.    Thanks,    Kristie Mancuso RN,[facility]  Outpatient Complex Case Management  [phone]

## 2018-04-30 NOTE — PROGRESS NOTES
Summary  4/30/18  Summary-  Chart reviewed in epic.  Pt to ortho for knee injection # 2 and 3.  She states she is a little better pain is about a 3.  But she states she feels like it is on the mend        Interventions  Pneumonia  Resolved goals met  Pt states she is doing the c and db ing exercises at least daily and occasionally 2 times a day.  States she will cough up a little clear mucus after the exercise but not in between. She states she is scheduled for another cxr when she goes to dr. Area.    Hypertension- reviewed blood pressure log and Blood pressure still running high.  Pressure average in am 160-190 / 50 -60.  In the evening the pressure is ag 145/54- 168/60 heart rate in 50 -60 range.  Pt did follow up with pcp per last encounter and was not able to get urgent appt with dr. Sellers but saw dr. Chisholm.  She dc the labetalol and started her on bystolic in the am along with the tekturnic and then the amlodipine in the pm.  Pt states no real change.  She states she will give it another couple of days and she will call and speak with dr. Sellers.  Review of labs and abd ultrasound and no significant abnormalities other than BUN elevated slightly and creatinine is wnl.  Advised pt to talk with dr. Sellers about kidney function and I will also send him a message inquiring about the same.    Message to dr. Area advising of continued elevation in blood pressure     Plan  Follow up on message to dr. area  Hypertension Education on hypertension and treatment                          Educate on daily recommended sodium intake                        Review krames information

## 2018-05-02 ENCOUNTER — TELEPHONE (OUTPATIENT)
Dept: RHEUMATOLOGY | Facility: CLINIC | Age: 83
End: 2018-05-02

## 2018-05-02 ENCOUNTER — OFFICE VISIT (OUTPATIENT)
Dept: FAMILY MEDICINE | Facility: CLINIC | Age: 83
End: 2018-05-02
Payer: MEDICARE

## 2018-05-02 VITALS
TEMPERATURE: 98 F | WEIGHT: 140.88 LBS | HEART RATE: 60 BPM | DIASTOLIC BLOOD PRESSURE: 60 MMHG | HEIGHT: 63 IN | SYSTOLIC BLOOD PRESSURE: 140 MMHG | BODY MASS INDEX: 24.96 KG/M2

## 2018-05-02 DIAGNOSIS — I10 HYPERTENSION, ESSENTIAL: Primary | ICD-10-CM

## 2018-05-02 PROCEDURE — 99213 OFFICE O/P EST LOW 20 MIN: CPT | Mod: PBBFAC,PO | Performed by: FAMILY MEDICINE

## 2018-05-02 PROCEDURE — 99214 OFFICE O/P EST MOD 30 MIN: CPT | Mod: S$PBB,,, | Performed by: FAMILY MEDICINE

## 2018-05-02 PROCEDURE — 99999 PR PBB SHADOW E&M-EST. PATIENT-LVL III: CPT | Mod: PBBFAC,,, | Performed by: FAMILY MEDICINE

## 2018-05-02 NOTE — PROGRESS NOTES
Subjective:       Patient ID: Gerda Lai is a 85 y.o. female.    Chief Complaint: Hypertension    Disclaimer: This note has been generated using voice-recognition software. There may be typographical errors that have been missed during proof-reading    Pt presents for follow up of hypertension.  She had reported home systolic pressures from 160-170s with diastolics around 100.  She recently increased Amlodipine from 2.5mg to 5mg without side effects      Hypertension   Pertinent negatives include no chest pain, headaches, palpitations or shortness of breath.     Review of Systems   Constitutional: Negative for activity change, fatigue and unexpected weight change.   Respiratory: Negative for chest tightness and shortness of breath.    Cardiovascular: Negative for chest pain, palpitations and leg swelling.   Neurological: Negative for dizziness, weakness, light-headedness and headaches.       Objective:      Physical Exam   Constitutional: She appears well-developed and well-nourished. No distress.   Neck: Normal range of motion. No JVD present.   Cardiovascular: Normal rate and regular rhythm.    No murmur heard.  Pulmonary/Chest: Effort normal and breath sounds normal. She has no wheezes. She has no rales.   Musculoskeletal: She exhibits no edema.   Lymphadenopathy:     She has no cervical adenopathy.       Assessment:       Essential hypertension  Plan:       1.  Continue present medications  2.  Pt will bring home blood pressure cuff to calibrate vs ours

## 2018-05-03 ENCOUNTER — CLINICAL SUPPORT (OUTPATIENT)
Dept: FAMILY MEDICINE | Facility: CLINIC | Age: 83
End: 2018-05-03
Payer: MEDICARE

## 2018-05-03 ENCOUNTER — TELEPHONE (OUTPATIENT)
Dept: FAMILY MEDICINE | Facility: CLINIC | Age: 83
End: 2018-05-03

## 2018-05-03 VITALS — DIASTOLIC BLOOD PRESSURE: 50 MMHG | SYSTOLIC BLOOD PRESSURE: 134 MMHG

## 2018-05-03 DIAGNOSIS — Z01.30 BLOOD PRESSURE CHECK: Primary | ICD-10-CM

## 2018-05-03 PROCEDURE — 99211 OFF/OP EST MAY X REQ PHY/QHP: CPT | Mod: PBBFAC,PO

## 2018-05-03 PROCEDURE — 99999 PR PBB SHADOW E&M-EST. PATIENT-LVL I: CPT | Mod: PBBFAC,,,

## 2018-05-03 NOTE — PROGRESS NOTES
Patient here to compare automatic home blood pressure monitor to manual blood pressure cuff. Left arm automatic blood pressure 140/50. Pulse 50. Left arm manual blood pressure 134/50. Dr. Sellers notified of results.

## 2018-05-03 NOTE — TELEPHONE ENCOUNTER
Patient here to compare home automatic blood pressure monitor to manual blood pressure cuff. Left arm automatic blood pressure 140/50. Pulse 50. Left arm manual blood pressure 134/50.

## 2018-05-08 ENCOUNTER — OUTPATIENT CASE MANAGEMENT (OUTPATIENT)
Dept: ADMINISTRATIVE | Facility: OTHER | Age: 83
End: 2018-05-08

## 2018-05-08 ENCOUNTER — OFFICE VISIT (OUTPATIENT)
Dept: RHEUMATOLOGY | Facility: CLINIC | Age: 83
End: 2018-05-08
Payer: MEDICARE

## 2018-05-08 VITALS
HEART RATE: 52 BPM | WEIGHT: 140.19 LBS | DIASTOLIC BLOOD PRESSURE: 68 MMHG | HEIGHT: 64 IN | SYSTOLIC BLOOD PRESSURE: 137 MMHG | BODY MASS INDEX: 23.93 KG/M2

## 2018-05-08 DIAGNOSIS — M17.0 PRIMARY OSTEOARTHRITIS OF BOTH KNEES: Primary | ICD-10-CM

## 2018-05-08 DIAGNOSIS — M75.81 ROTATOR CUFF TENDINITIS, RIGHT: ICD-10-CM

## 2018-05-08 DIAGNOSIS — M75.101 ROTATOR CUFF TEAR ARTHROPATHY, RIGHT: ICD-10-CM

## 2018-05-08 DIAGNOSIS — M12.811 ROTATOR CUFF TEAR ARTHROPATHY, RIGHT: ICD-10-CM

## 2018-05-08 DIAGNOSIS — M25.511 RIGHT SHOULDER PAIN, UNSPECIFIED CHRONICITY: ICD-10-CM

## 2018-05-08 PROCEDURE — 99999 PR PBB SHADOW E&M-EST. PATIENT-LVL IV: CPT | Mod: PBBFAC,,, | Performed by: INTERNAL MEDICINE

## 2018-05-08 PROCEDURE — 99214 OFFICE O/P EST MOD 30 MIN: CPT | Mod: PBBFAC,25 | Performed by: INTERNAL MEDICINE

## 2018-05-08 PROCEDURE — 20610 DRAIN/INJ JOINT/BURSA W/O US: CPT | Mod: PBBFAC | Performed by: INTERNAL MEDICINE

## 2018-05-08 PROCEDURE — 99212 OFFICE O/P EST SF 10 MIN: CPT | Mod: 25,S$PBB,, | Performed by: INTERNAL MEDICINE

## 2018-05-08 RX ORDER — TRIAMCINOLONE ACETONIDE 40 MG/ML
40 INJECTION, SUSPENSION INTRA-ARTICULAR; INTRAMUSCULAR
Status: DISCONTINUED | OUTPATIENT
Start: 2018-05-08 | End: 2018-05-08 | Stop reason: HOSPADM

## 2018-05-08 RX ADMIN — TRIAMCINOLONE ACETONIDE 40 MG: 40 INJECTION, SUSPENSION INTRA-ARTICULAR; INTRAMUSCULAR at 11:05

## 2018-05-08 ASSESSMENT — ROUTINE ASSESSMENT OF PATIENT INDEX DATA (RAPID3)
WHEN YOU AWAKENED IN THE MORNING OVER THE LAST WEEK, PLEASE INDICATE THE AMOUNT OF TIME IT TAKES UNTIL YOU ARE AS LIMBER AS YOU WILL BE FOR THE DAY: 15 MINS
AM STIFFNESS SCORE: 1, YES
PAIN SCORE: 5
PATIENT GLOBAL ASSESSMENT SCORE: 5
TOTAL RAPID3 SCORE: 3.89
MDHAQ FUNCTION SCORE: .5
PSYCHOLOGICAL DISTRESS SCORE: 1.1
FATIGUE SCORE: 3

## 2018-05-08 NOTE — ASSESSMENT & PLAN NOTE
* After verbal consent and cleansing with Chloraprep the right gr. Trochanteric bursa injected with Kenalog 40mg with 1 ml 1 % lidocaine. Patient tolerated procedure well.    Ref to PT for strengthening and gentle rom

## 2018-05-08 NOTE — ASSESSMENT & PLAN NOTE
* After verbal consent and cleansing with Chloraprep the right glenohumeral joint injected with Kenalog 40mg with 1 ml 1 % lidocaine. Patient tolerated procedure well.  Ref to PT for strengthening and gentle rom

## 2018-05-08 NOTE — PROCEDURES
Large Joint Aspiration/Injection  Date/Time: 5/8/2018 11:00 AM  Performed by: ROMINA MITCHELL  Authorized by: ROMINA MITCHELL     Consent Done?:  Yes (Verbal)  Indications:  Pain  Procedure site marked: Yes    Timeout: Prior to procedure the correct patient, procedure, and site was verified      Location:  Shoulder  Site:  R glenohumeral  Prep: Patient was prepped and draped in usual sterile fashion    Ultrasonic Guidance for needle placement: No  Needle size:  25 G  Medications:  40 mg triamcinolone acetonide 40 mg/mL  Aspirate amount (ml):  0  Patient tolerance:  Patient tolerated the procedure well with no immediate complications

## 2018-05-08 NOTE — PROGRESS NOTES
"Subjective:       Patient ID: Gerda Lai is a 85 y.o. female.    Chief Complaint: Generalized OA  HPI 10-15 min am stiffness. Most symptomatic is right shoulder.  Wrenched right shoulder when slipped on wet floor, Dr. Area referred to PT/OT attended only several weeks, then developed pneumonia and suspended. Has been seeing Ortho for knees Dr. [facility] injected right knee with Kenalog 3/1/18 Had Euflexxa x 3 righ knee 4/11-4/25 some improvement already, can walk at home without stick, but still uses out of doors but using in right hand! The left knee 12/27/17-1/10/18. I had injected right shoulder 10/20/17 but then reinjured  Review of Systems   Constitutional: Positive for fatigue. Negative for appetite change, fever and unexpected weight change.   HENT: Negative for mouth sores.         Dry mouth   Eyes: Negative for visual disturbance.   Respiratory: Negative for cough, shortness of breath and wheezing.    Cardiovascular: Negative for chest pain and palpitations.   Gastrointestinal: Negative for abdominal pain, anal bleeding, blood in stool, constipation, diarrhea, nausea and vomiting.   Genitourinary: Negative for dysuria, frequency and urgency.   Musculoskeletal: Negative for arthralgias, back pain, gait problem, joint swelling, myalgias, neck pain and neck stiffness.   Skin: Negative for rash.   Neurological: Negative for weakness, numbness and headaches.   Hematological: Negative for adenopathy. Does not bruise/bleed easily.   Psychiatric/Behavioral: Negative for sleep disturbance. The patient is not nervous/anxious.          Objective:   /68   Pulse (!) 52   Ht 5' 3.6" (1.615 m)   Wt 63.6 kg (140 lb 3.2 oz)   BMI 24.37 kg/m²      Physical Exam       Right Side Rheumatological Exam     The patient is tender to palpation of the shoulder    Shoulder Exam   Tenderness Location: no tenderness    Range of Motion   The patient has normal right shoulder ROM.            Assessment/Plan         Problem " List Items Addressed This Visit     Osteoarthritis of knees, bilateral - Primary    Current Assessment & Plan     Ref to PT for quad/ham strengthening and gait training with cane left hand         Relevant Orders    Ambulatory Referral to Physical/Occupational Therapy    Rotator cuff tear arthropathy, right    Current Assessment & Plan     * After verbal consent and cleansing with Chloraprep the right glenohumeral joint injected with Kenalog 40mg with 1 ml 1 % lidocaine. Patient tolerated procedure well.  Ref to PT for strengthening and gentle rom           Other Visit Diagnoses     Rotator cuff tendinitis, right        Relevant Orders    Ambulatory Referral to Physical/Occupational Therapy    Large Joint Aspiration/Injection    Right shoulder pain, unspecified chronicity        Relevant Orders    Large Joint Aspiration/Injection

## 2018-05-09 NOTE — PROGRESS NOTES
Summary  5/9/18  Summary-  Chart reviewed in epic.  Pt  states she is a little better pain is about a 2.  She states she is getting around better        Interventions  Pneumonia  Resolved goals met  Pt had repeat cxr which shows resolution of the fluid and consolidation.  Patient was very excited that the breathing exercises she did helped.    Hypertension- reviewed blood pressure log and Blood pressure is improving .  Yesterday am it was 134/76 today a little higher at 156/82.  She states she also feels that when she does not sleep well it plays a part.  She states she is trying to do as we discussed in past to stop reading etc 1 hour before bedtime and she does rest better and noticed a difference in the day as well.    Reviewed diet for past several days.  Pt is doing better.  She has increased vegetables and not as much eating out.  Reviewed how to read lables and identify sodium content per serving.  Reviewed recommended daily allowance of sodium.  Pt verbalized understanding.    Plan  Follow up on message to dr. area  Hypertension Education on hypertension and treatment                          Educate on daily recommended sodium intake                        Review kaitlin information

## 2018-05-12 ENCOUNTER — OFFICE VISIT (OUTPATIENT)
Dept: INTERNAL MEDICINE | Facility: CLINIC | Age: 83
End: 2018-05-12
Payer: MEDICARE

## 2018-05-12 ENCOUNTER — HOSPITAL ENCOUNTER (OUTPATIENT)
Dept: RADIOLOGY | Facility: HOSPITAL | Age: 83
Discharge: HOME | End: 2018-05-12
Attending: INTERNAL MEDICINE
Payer: MEDICARE

## 2018-05-12 VITALS
HEART RATE: 56 BPM | WEIGHT: 140 LBS | TEMPERATURE: 98 F | SYSTOLIC BLOOD PRESSURE: 186 MMHG | DIASTOLIC BLOOD PRESSURE: 60 MMHG | RESPIRATION RATE: 18 BRPM | BODY MASS INDEX: 24.33 KG/M2

## 2018-05-12 DIAGNOSIS — I10 ESSENTIAL HYPERTENSION: Primary | ICD-10-CM

## 2018-05-12 DIAGNOSIS — R05.9 COUGH: ICD-10-CM

## 2018-05-12 PROCEDURE — 99213 OFFICE O/P EST LOW 20 MIN: CPT | Mod: PBBFAC,25,PO | Performed by: INTERNAL MEDICINE

## 2018-05-12 PROCEDURE — 99213 OFFICE O/P EST LOW 20 MIN: CPT | Mod: S$PBB,,, | Performed by: INTERNAL MEDICINE

## 2018-05-12 PROCEDURE — 71046 X-RAY EXAM CHEST 2 VIEWS: CPT | Mod: 26,,, | Performed by: RADIOLOGY

## 2018-05-12 PROCEDURE — 99999 PR PBB SHADOW E&M-EST. PATIENT-LVL III: CPT | Mod: PBBFAC,,, | Performed by: INTERNAL MEDICINE

## 2018-05-12 PROCEDURE — 71046 X-RAY EXAM CHEST 2 VIEWS: CPT | Mod: TC,PO

## 2018-05-12 RX ORDER — LABETALOL 100 MG/1
TABLET, FILM COATED ORAL
Qty: 135 TABLET | Refills: 1
Start: 2018-05-12 | End: 2018-07-03 | Stop reason: SDUPTHER

## 2018-05-14 ENCOUNTER — OFFICE VISIT (OUTPATIENT)
Dept: FAMILY MEDICINE | Facility: CLINIC | Age: 83
End: 2018-05-14
Payer: MEDICARE

## 2018-05-14 VITALS
HEIGHT: 64 IN | RESPIRATION RATE: 20 BRPM | WEIGHT: 139.75 LBS | SYSTOLIC BLOOD PRESSURE: 150 MMHG | BODY MASS INDEX: 23.86 KG/M2 | TEMPERATURE: 99 F | DIASTOLIC BLOOD PRESSURE: 79 MMHG

## 2018-05-14 DIAGNOSIS — R06.2 WHEEZES: ICD-10-CM

## 2018-05-14 DIAGNOSIS — R05.9 COUGH: Primary | ICD-10-CM

## 2018-05-14 DIAGNOSIS — R09.89 CHEST CONGESTION: ICD-10-CM

## 2018-05-14 DIAGNOSIS — R09.81 NASAL CONGESTION: ICD-10-CM

## 2018-05-14 PROCEDURE — 99999 PR PBB SHADOW E&M-EST. PATIENT-LVL III: CPT | Mod: PBBFAC,,, | Performed by: FAMILY MEDICINE

## 2018-05-14 PROCEDURE — 99214 OFFICE O/P EST MOD 30 MIN: CPT | Mod: S$PBB,,, | Performed by: FAMILY MEDICINE

## 2018-05-14 PROCEDURE — 99213 OFFICE O/P EST LOW 20 MIN: CPT | Mod: PBBFAC,PO | Performed by: FAMILY MEDICINE

## 2018-05-14 RX ORDER — GUAIFENESIN 1200 MG/1
1 TABLET, EXTENDED RELEASE ORAL 2 TIMES DAILY PRN
Qty: 20 TABLET | Refills: 0 | COMMUNITY
Start: 2018-05-14 | End: 2018-05-24

## 2018-05-14 RX ORDER — IPRATROPIUM BROMIDE 21 UG/1
2 SPRAY, METERED NASAL 2 TIMES DAILY PRN
Qty: 30 ML | Refills: 2 | Status: SHIPPED | OUTPATIENT
Start: 2018-05-14 | End: 2019-04-11

## 2018-05-14 RX ORDER — ALBUTEROL SULFATE 90 UG/1
2 AEROSOL, METERED RESPIRATORY (INHALATION) EVERY 6 HOURS PRN
Qty: 18 G | Refills: 1 | Status: SHIPPED | OUTPATIENT
Start: 2018-05-14 | End: 2018-05-31

## 2018-05-14 RX ORDER — BENZONATATE 200 MG/1
200 CAPSULE ORAL 3 TIMES DAILY PRN
Qty: 30 CAPSULE | Refills: 0 | Status: SHIPPED | OUTPATIENT
Start: 2018-05-14 | End: 2018-05-24

## 2018-05-15 NOTE — PROGRESS NOTES
Subjective:       Patient ID: Gerda Lai is a 85 y.o. female.    Chief Complaint: URI; Chest Congestion; and Sinus Problem   over the last few days patient is been experiencing low-grade fever nasal congestion cough  Postnasal drip and elevated blood pressures.  She has been afraid to take the amlodipine  HPI the above  Review of Systems   Constitutional: Positive for chills and fever.   HENT: Positive for congestion, mouth sores, rhinorrhea and sinus pain.    Eyes: Negative.    Respiratory: Positive for cough.    Cardiovascular: Negative.    Gastrointestinal: Negative.    Endocrine: Negative.    Genitourinary: Negative.    Musculoskeletal: Negative.    Skin: Negative.    Allergic/Immunologic: Positive for environmental allergies.   Neurological: Negative.    Hematological: Negative.    Psychiatric/Behavioral: Negative.        Objective:      Physical Exam   Constitutional: She is oriented to person, place, and time. She appears well-developed and well-nourished. No distress.   HENT:   Head: Normocephalic and atraumatic.   Right Ear: Hearing, tympanic membrane, external ear and ear canal normal.   Left Ear: Hearing, tympanic membrane, external ear and ear canal normal.   Nose: Mucosal edema, rhinorrhea, sinus tenderness and nasal deformity present. Right sinus exhibits no maxillary sinus tenderness and no frontal sinus tenderness. Left sinus exhibits no maxillary sinus tenderness and no frontal sinus tenderness.   Mouth/Throat: Uvula swelling present. Oropharyngeal exudate, posterior oropharyngeal edema and tonsillar abscesses present.   Eyes: Conjunctivae and EOM are normal. Pupils are equal, round, and reactive to light.   Neck: Normal range of motion. Neck supple. No JVD present. No thyromegaly present.   Cardiovascular: Normal rate, regular rhythm, normal heart sounds and intact distal pulses.    No murmur heard.  Pulmonary/Chest: Effort normal. She has wheezes. She has rales.   Abdominal: Soft. Bowel  sounds are normal. She exhibits no distension and no mass. There is no tenderness.   Neurological: She is alert and oriented to person, place, and time. She displays normal reflexes. No cranial nerve deficit or sensory deficit. She exhibits normal muscle tone. Coordination normal.   Skin: Skin is warm and dry. She is not diaphoretic.   Psychiatric: She has a normal mood and affect. Her behavior is normal. Judgment and thought content normal.   Nursing note and vitals reviewed.      Assessment:       1. Cough    2. Nasal congestion    3. Chest congestion    4. Wheezes     5.     htn  Plan:     see med card 5-14-18   TEKTURNA 300 mg Tab 300 mg, Daily        pravastatin (PRAVACHOL) 40 MG tablet         omeprazole (PRILOSEC OTC) 20 MG tablet 20 mg, Daily        multivitamin capsule 1 capsule, Daily        LORazepam (ATIVAN) 0.5 MG tablet         labetalol (NORMODYNE) 100 MG tablet         ipratropium (ATROVENT) 0.03 % nasal spray 2 spray, 2 times daily PRN        guaiFENesin (MUCINEX) 1,200 mg Ta12 1 tablet, 2 times daily PRN        glucosamine-chondroitin 500-400 mg tablet 1 tablet, Daily        coenzyme Q10 (CO Q-10) 100 mg capsule 100 mg, Daily        benzonatate (TESSALON) 200 MG capsule 200 mg, 3 times daily PRN        B INFANTIS/B ANI/B JOSE/B BIFID (PROBIOTIC 4X ORAL)         ascorbic acid (VITAMIN C) 100 MG tablet 100 mg, 2 times daily        albuterol 90 mcg/actuation inhaler 2 puff, Every 6 hours PRN        acetaminophen (TYLENOL) 650 MG TbSR 650 mg, As needed (PRN)

## 2018-05-17 ENCOUNTER — TELEPHONE (OUTPATIENT)
Dept: FAMILY MEDICINE | Facility: CLINIC | Age: 83
End: 2018-05-17

## 2018-05-17 DIAGNOSIS — B99.9 INFECTION: Primary | ICD-10-CM

## 2018-05-17 RX ORDER — DOXYCYCLINE HYCLATE 100 MG
100 TABLET ORAL 2 TIMES DAILY
Qty: 20 TABLET | Refills: 0 | Status: SHIPPED | OUTPATIENT
Start: 2018-05-17 | End: 2018-05-31

## 2018-05-17 NOTE — TELEPHONE ENCOUNTER
Spoke with patient reports the following symptoms cough, congestion, which has gotten worse since she was last seen on 5/14/18.  Patient would like something sent to the pharmacy.

## 2018-05-17 NOTE — TELEPHONE ENCOUNTER
----- Message from Lu Manuel sent at 5/17/2018  4:12 PM CDT -----  Contact: self/ [phone]  Pleased call patient about the change of her medications and if she is to continue to take her other meds. Please call to advise.

## 2018-05-17 NOTE — TELEPHONE ENCOUNTER
----- Message from Jennifer Ayon sent at 5/17/2018 10:18 AM CDT -----  Contact: Pt [phone]  Pt would like a call back from the nurse she states since her last appointment she states she is feeling worse.

## 2018-05-18 NOTE — TELEPHONE ENCOUNTER
Spoke with patient would like to know if she should continue taking the albuterol inhaler,the nasal spray and the tessalon since you prescribed the doxycycline.  Spoke with Dr. Chisholm states patient can continue taking the above listed medication as needed with the doxycyline.   Patient informed verbalizes understanding

## 2018-05-20 NOTE — PROGRESS NOTES
Subjective:       Patient ID: Gerda Lai is a 85 y.o. female.    Chief Complaint: Hypertension; Sinus Problem; and Cough    HPI   The patient presents with complaints of elevated blood pressure cough.  The patient feels she is intolerant to high systolic just recently added to her antihypertensive medication regimen.  She complains of heaviness in her legs and also a blister inside of her mouth.  She would like to try labetalol again which she has used in the past.  Her blood pressure was noted to be 207/71 this morning.  Review of Systems   Constitutional: Negative for activity change, appetite change, fatigue and unexpected weight change.   HENT: Positive for mouth sores.    Eyes: Negative for visual disturbance.   Respiratory: Positive for cough. Negative for shortness of breath.    Cardiovascular: Negative for chest pain, palpitations and leg swelling.   Gastrointestinal: Negative for abdominal pain, blood in stool, constipation and diarrhea.   Genitourinary: Negative for dysuria and hematuria.   Musculoskeletal: Negative for arthralgias, neck pain and neck stiffness.   Skin: Negative for rash.   Neurological: Negative for dizziness, syncope and headaches.   Psychiatric/Behavioral: Negative for sleep disturbance.       Objective:      Physical Exam   Constitutional: She is oriented to person, place, and time. She appears well-developed and well-nourished. No distress.   HENT:   Head: Normocephalic and atraumatic.   Sinuses are nontender to palpation.   Eyes: Conjunctivae are normal. No scleral icterus.   Neck: Normal range of motion. Neck supple. No JVD present. No thyromegaly present.   Cardiovascular: Normal rate, regular rhythm, normal heart sounds and intact distal pulses.  Exam reveals no gallop and no friction rub.    No murmur heard.  No pedal edema is present.   Pulmonary/Chest: Effort normal and breath sounds normal. No respiratory distress. She has no wheezes. She has no rales.   Abdominal: Soft.  Bowel sounds are normal. She exhibits no mass. There is no tenderness.   Lymphadenopathy:     She has no cervical adenopathy.   Neurological: She is alert and oriented to person, place, and time.   Skin: Skin is warm and dry. No rash noted.   Nursing note and vitals reviewed.      Chest x-ray today was negative for any acute changes.  Assessment:       1. Essential hypertension    2. Cough        Plan:       Gerda was seen today for hypertension, sinus problem and cough.  Bystolic will be discontinued since the patient feels she cannot tolerate the medication.  Labetalol will be ordered.  The patient should continue amlodipine.  The patient should have her blood pressure rechecked in 10-14 days.    Diagnoses and all orders for this visit:    Essential hypertension  -     labetalol (NORMODYNE) 100 MG tablet; Take 2 tablets twice daily    Cough  -     X-Ray Chest PA And Lateral; Future

## 2018-05-28 ENCOUNTER — OUTPATIENT CASE MANAGEMENT (OUTPATIENT)
Dept: ADMINISTRATIVE | Facility: OTHER | Age: 83
End: 2018-05-28

## 2018-05-28 NOTE — PROGRESS NOTES
Summary  5/28/18  Summary-  Chart reviewed in epic.  Pt  states she is a little better pain is about a 2.  She states she is getting around better  Pt had a development of cough and went to see primary care for urgent eval.  She states it is her allergies.  They gave her resp treatments and antibiotics  She states she is feeling better.  She had to go see an ent after that because she was not improving.  He gave her shot of prednisone and another antibiotic        Interventions  Pneumonia  Resolved goals met  Pt had repeat cxr which shows resolution of the fluid and consolidation.  Patient was very excited that the breathing exercises she did helped.    Hypertension- states range is 124/75  - 150/82 .  Pt is knowledgeable about range and when to notify md  Advised that today is case closure and pt verbalized understanding.   pt has good understanding of how to read labels and determine sodium content.    Pt is aware of the risk factors associated with HBP and is compliant with medications and physician visits    With all goals met Case closure today.  Pt verbalized understanding and advised she can contact this nurse if new needs arise

## 2018-05-31 ENCOUNTER — HOSPITAL ENCOUNTER (EMERGENCY)
Facility: HOSPITAL | Age: 83
Discharge: HOME | End: 2018-05-31
Attending: EMERGENCY MEDICINE
Payer: MEDICARE

## 2018-05-31 VITALS
SYSTOLIC BLOOD PRESSURE: 168 MMHG | DIASTOLIC BLOOD PRESSURE: 98 MMHG | HEART RATE: 55 BPM | RESPIRATION RATE: 18 BRPM | TEMPERATURE: 99 F | BODY MASS INDEX: 23.92 KG/M2 | OXYGEN SATURATION: 98 % | WEIGHT: 135 LBS | HEIGHT: 63 IN

## 2018-05-31 DIAGNOSIS — I10 HYPERTENSION: Primary | ICD-10-CM

## 2018-05-31 PROCEDURE — 96372 THER/PROPH/DIAG INJ SC/IM: CPT

## 2018-05-31 PROCEDURE — 63600175 PHARM REV CODE 636 W HCPCS: Performed by: PHYSICIAN ASSISTANT

## 2018-05-31 PROCEDURE — 99285 EMERGENCY DEPT VISIT HI MDM: CPT | Mod: ,,, | Performed by: PHYSICIAN ASSISTANT

## 2018-05-31 PROCEDURE — 93005 ELECTROCARDIOGRAM TRACING: CPT

## 2018-05-31 PROCEDURE — 99283 EMERGENCY DEPT VISIT LOW MDM: CPT | Mod: 25

## 2018-05-31 PROCEDURE — 93010 ELECTROCARDIOGRAM REPORT: CPT | Mod: ,,, | Performed by: INTERNAL MEDICINE

## 2018-05-31 PROCEDURE — 99291 CRITICAL CARE FIRST HOUR: CPT

## 2018-05-31 RX ORDER — LORAZEPAM 2 MG/ML
0.5 INJECTION INTRAMUSCULAR
Status: COMPLETED | OUTPATIENT
Start: 2018-05-31 | End: 2018-05-31

## 2018-05-31 RX ADMIN — LORAZEPAM 0.5 MG: 2 INJECTION INTRAMUSCULAR; INTRAVENOUS at 06:05

## 2018-05-31 NOTE — DISCHARGE INSTRUCTIONS
Stop taking Mucinex DM. Finish antibiotic to completion. Restart daily Ativan medication. Follow up with PCP on Monday to evaluate high pressures, and whether medication should be added or amended. Buy saline nasal flushes for sinus improvement. Return to the ED immediately if you develop headache, chest pain, abdominal pain, or visual changes.    Our goal in the emergency department is to always give you outstanding care and exceptional service. You may receive a survey by mail or e-mail in the next week regarding your experience in our ED. We would greatly appreciate your completing and returning the survey. Your feedback provides us with a way to recognize our staff who give very good care and it helps us learn how to improve when your experience was below our aspiration of excellence.

## 2018-05-31 NOTE — ED TRIAGE NOTES
Patient states B/P has been trending up, took extra dose of 1/2 tab Labetalol at 1515. Patient has written list of blood pressures Thurs 0850 210/83 HR 65, 1515 215/83 HR 69. States unsteady on feet and slight headache with elevated B/p, took B/P meds today, no Ativan today.

## 2018-05-31 NOTE — ED NOTES
"Patient identifiers verified and correct for Ms Lai  C/C: Elevated B/P   APPEARANCE: awake and alert in NAD.  SKIN: warm, dry and intact. No breakdown or bruising.  MUSCULOSKELETAL: Patient moving all extremities spontaneously, no obvious swelling or deformities noted. Ambulates independently.  RESPIRATORY: Denies shortness of breath.Respirations unlabored.   CARDIAC: Denies CP, 2+ distal pulses; no peripheral edema  ABDOMEN: S/ND/NT, Denies nausea  : voids spontaneously, denies difficulty  Neurologic: AAO x 4; follows commands equal strength in all extremities; denies numbness/tingling. Denies dizziness Denies weakness, Denies headache, states "unsteady" on feet.     "

## 2018-06-01 NOTE — ED PROVIDER NOTES
"Encounter Date: 5/31/2018       History     Chief Complaint   Patient presents with    Hypertension     Pt states her BP has been "trending up" for the past week.  Pt is asymptomatic.  Pt took 1/2tab labetolol around 3:15.      Ms Lai is a 85YOWF who presents with HTN for several weeks, pertinent PMHx HTN, HLD, anxiety. Patient reports BP readings at home have been trending upward over the past month (140s-160s), but have been "alarmingly high" over the past week (systolic 180s-200s). Patient was recently started on antibiotic last week for sinusitis by ENT. Also given steroid IM and has been taking Mucinex DM for the past week. Patient takes QD labetalol for HTN, and is regularly seen by PCP. She has not alerted PCP to her up-trending pressures. She admits to taking 1.5x doses of labetalol this morning for pressures. Of note, patient takes QD Ativan for anxiety symptoms. She has not taken medication for the past few days in fear of any interaction. She denies headache, visual disturbance, LOC, confusion, CP, upper back pain, SOB, abdominal pain, decreased UOP, hematuria, dysuria. She is accompanied by friend who denies AMS for patient.           Review of patient's allergies indicates:   Allergen Reactions    Sulfa (sulfonamide antibiotics) Rash    Clarithromycin Other (See Comments)     Weak, extreme fatigue. dizziness    Flexeril [cyclobenzaprine] Other (See Comments)     Dizziness    Iodine and iodide containing products     Lisinopril Other (See Comments)     Cough and sensation of throat swelling/?angioedema    Losartan Rash    Metoprolol Swelling     Tightness in throat    Tramadol Other (See Comments)     Dizzy and weak    Verapamil (bulk) Palpitations    Voltaren [diclofenac sodium] Other (See Comments)     Drops blood pressure     Past Medical History:   Diagnosis Date    Arthritis     Basal cell carcinoma 09/2016    right post auricular neck     Breast cancer     Cataract     " "Fibromyalgia     Hyperlipidemia     Hypertension     Personal history of colonic polyps     Pneumonia     SCC (squamous cell carcinoma) 2015    R chest    SCC (squamous cell carcinoma) 2016    left medial shoulder    SCC (squamous cell carcinoma) 2017    left knee    Squamous cell carcinoma 2015    right forearm    Thyroid disease     Vaginitis      Past Surgical History:   Procedure Laterality Date    ADENOIDECTOMY      CATARACT EXTRACTION W/  INTRAOCULAR LENS IMPLANT Bilateral     MASTECTOMY      partial right    thyriodectomy      partial    tonsillectomy      TOTAL HIP ARTHROPLASTY      right    Yag  Left      Family History   Problem Relation Age of Onset    Breast cancer Mother     Stroke Paternal Grandfather     Heart disease Father     Cancer Paternal Aunt     Heart disease Paternal Aunt     Glaucoma Paternal Grandmother     Amblyopia Neg Hx     Blindness Neg Hx     Cataracts Neg Hx     Diabetes Neg Hx     Hypertension Neg Hx     Macular degeneration Neg Hx     Retinal detachment Neg Hx     Strabismus Neg Hx     Thyroid disease Neg Hx     Melanoma Neg Hx      Social History   Substance Use Topics    Smoking status: Never Smoker    Smokeless tobacco: Never Used    Alcohol use No      Comment: socially     Review of Systems   Constitutional: Negative for chills, diaphoresis, fatigue and fever.   HENT: Positive for sinus pain and sinus pressure ("improving" after ENT Tx). Negative for congestion, sneezing, sore throat and tinnitus.    Eyes: Negative for photophobia and visual disturbance.   Respiratory: Negative for cough, chest tightness, shortness of breath and wheezing.    Cardiovascular: Negative for chest pain, palpitations and leg swelling.   Gastrointestinal: Negative for abdominal pain, anal bleeding, nausea and vomiting.   Genitourinary: Negative for decreased urine volume, dysuria, flank pain, frequency, hematuria and vaginal bleeding.   Musculoskeletal: Negative " for arthralgias, back pain, myalgias, neck pain and neck stiffness.   Skin: Negative for color change and wound.   Allergic/Immunologic: Negative for immunocompromised state.   Neurological: Negative for dizziness, syncope, facial asymmetry, speech difficulty, weakness, light-headedness, numbness and headaches.   Psychiatric/Behavioral: Negative for agitation, confusion and decreased concentration. The patient is nervous/anxious.        Physical Exam     Initial Vitals [05/31/18 1611]   BP Pulse Resp Temp SpO2   (!) 200/79 63 16 97.7 °F (36.5 °C) 98 %      MAP       119.33         Physical Exam    Nursing note and vitals reviewed.  Constitutional: She appears well-developed and well-nourished. She is not diaphoretic. No distress.   Well-appearing female, younger than stated age in NAD, VSS, afebrile. 98% on RA.   HENT:   Head: Normocephalic and atraumatic.   Right Ear: External ear normal.   Left Ear: External ear normal.   Nose: Nose normal.   Mouth/Throat: Oropharynx is clear and moist. No oropharyngeal exudate.   Eyes: Conjunctivae and EOM are normal. Pupils are equal, round, and reactive to light. No scleral icterus.   Neck: Normal range of motion. Neck supple.   Cardiovascular: Normal rate, regular rhythm, normal heart sounds and intact distal pulses.   No murmur heard.  Pulmonary/Chest: Breath sounds normal. No respiratory distress. She has no wheezes.   Abdominal: Soft. Bowel sounds are normal. There is no tenderness.   Musculoskeletal: She exhibits no edema or tenderness.   Lymphadenopathy:     She has no cervical adenopathy.   Neurological: She is alert and oriented to person, place, and time. She has normal strength. No cranial nerve deficit or sensory deficit.   CN 2-12 grossly intact. No aphasia, ataxia on exam. Cerebellar function tests negative. No gait disturbance. No neurosensory deficit. Strength 5/5 BLE, BUE.    Skin: Skin is dry. Capillary refill takes less than 2 seconds. No erythema.  "  Psychiatric: She has a normal mood and affect. Her behavior is normal. Thought content normal.         ED Course   Procedures  Labs Reviewed - No data to display          Medical Decision Making:   Initial Assessment:   Patient with Hx HTN presents for increased BP over the past week. Recently given steroid IM, daily Mucinex DM, and has not taken Ativan in several days. No medical complaints. Full neuro exam unremarkable.  Differential Diagnosis:   DDX essential HTN, anxiety. Physical exam and history taking decrease clinical suspicion for end-organ dysfunction, hypertensive emergency, aortic dissection, hypertensive encephalopathy, renal dysfunction.   ED Management:  EKG unremarkable, sinus bradycardia likely secondary to extra dose of labetalol taken today. Pressure recheck in /100. Patient appears anxious, no change in neuro status. 2x dose of Ativan given IM with BP recheck of 170/90. Patient states she "feels more reassured" and is comfortable going home. I considered but do not suspect emergent sequela of patient's hypertension; likely a result of OTC and Rx medications given to patient. We discussed at length conservative home changes (Mucinex DM cessation, saline spray for sinus issues) and f/u with PCP next week. She agreed to plan of care and voiced understanding.  Discharged with strict ED return instructions.    Miladis Luong PA-C  06/01/2018    I discussed the following case, diagnosis and plan of care with attending physician.                        Clinical Impression:   The encounter diagnosis was Hypertension.    Disposition:   Disposition: Discharged  Condition: Stable                        Miladis Luong PA-C  06/01/18 0011    "

## 2018-06-04 ENCOUNTER — NURSE TRIAGE (OUTPATIENT)
Dept: ADMINISTRATIVE | Facility: CLINIC | Age: 83
End: 2018-06-04

## 2018-06-04 RX ORDER — SPIRONOLACTONE 50 MG/1
50 TABLET, FILM COATED ORAL DAILY
Qty: 30 TABLET | Refills: 11 | Status: SHIPPED | OUTPATIENT
Start: 2018-06-04 | End: 2018-08-07 | Stop reason: SINTOL

## 2018-06-04 NOTE — TELEPHONE ENCOUNTER
Patient reporting elevated blood pressures. Reports that machine would not register this morning because blood pressure was so elevated. Reports 11 am blood pressure 199/71. Verbalizes that she has just taken a labetolol and ativan. Verbalizes that she is feeling  Better and that she will call back in an hour with another reading.

## 2018-06-04 NOTE — TELEPHONE ENCOUNTER
Patient returned call. Verbalizes that she slept since we last spoke. Reports that she took blood pressure about 30 min ago and reading is 184/69. Please advise.

## 2018-06-04 NOTE — TELEPHONE ENCOUNTER
Please inform pt that I have called in a prescription for Aldactone; needs appt to see me in the next 2 weeks, thanks

## 2018-06-04 NOTE — TELEPHONE ENCOUNTER
Reason for Disposition   BP > 160 / 100 and any cardiac or neurologic symptoms (e.g., chest pain, difficulty breathing, unsteady gait, blurred vision)    Protocols used: ST HIGH BLOOD PRESSURE-A-OH    Gerda is calling to report her blood pressure is so high that her cuff will not read and she feels unsteady on her feet.  Per protocol advised to go to ER.  Just took meds about 15 minutes ago.  States has been to ER for elevated blood pressure previously and will not go back.  Requesting to come in to office today.  Please contact Gerda to advise.  She can be reached at [phone]

## 2018-06-05 ENCOUNTER — TELEPHONE (OUTPATIENT)
Dept: FAMILY MEDICINE | Facility: CLINIC | Age: 83
End: 2018-06-05

## 2018-06-05 NOTE — TELEPHONE ENCOUNTER
----- Message from Dong Saha sent at 6/5/2018  9:29 AM CDT -----  Contact: self/[phone]  Pt states that she spoke with Meghan on yesterday and the doctor was supposed to send something into her local pharmacy and when she got there nothing had been called in. Please advise.      Thanks

## 2018-06-18 ENCOUNTER — OFFICE VISIT (OUTPATIENT)
Dept: FAMILY MEDICINE | Facility: CLINIC | Age: 83
End: 2018-06-18
Payer: MEDICARE

## 2018-06-18 VITALS
HEART RATE: 59 BPM | TEMPERATURE: 98 F | SYSTOLIC BLOOD PRESSURE: 130 MMHG | BODY MASS INDEX: 24.57 KG/M2 | DIASTOLIC BLOOD PRESSURE: 68 MMHG | HEIGHT: 63 IN | WEIGHT: 138.69 LBS

## 2018-06-18 DIAGNOSIS — I10 ESSENTIAL HYPERTENSION: ICD-10-CM

## 2018-06-18 PROCEDURE — 99213 OFFICE O/P EST LOW 20 MIN: CPT | Mod: PBBFAC,PO | Performed by: FAMILY MEDICINE

## 2018-06-18 PROCEDURE — 99214 OFFICE O/P EST MOD 30 MIN: CPT | Mod: S$PBB,,, | Performed by: FAMILY MEDICINE

## 2018-06-18 PROCEDURE — 99999 PR PBB SHADOW E&M-EST. PATIENT-LVL III: CPT | Mod: PBBFAC,,, | Performed by: FAMILY MEDICINE

## 2018-06-18 NOTE — PROGRESS NOTES
Disclaimer: This note has been generated using voice-recognition software. There may be typographical errors that have been missed during proof-reading      84 yo presents today for follow up of hypertension.  She was seen with elevated BP and Labetalol was increased to 200mg bid.  Since that time, her BP has ranged from 120-150 systolics and 50-70 diastolics.  She has noted recurrent dizziness with change in position, no history of syncope.

## 2018-06-25 ENCOUNTER — OFFICE VISIT (OUTPATIENT)
Dept: DERMATOLOGY | Facility: CLINIC | Age: 83
End: 2018-06-25
Payer: MEDICARE

## 2018-06-25 DIAGNOSIS — L81.4 LENTIGO: ICD-10-CM

## 2018-06-25 DIAGNOSIS — L90.5 SCAR: ICD-10-CM

## 2018-06-25 DIAGNOSIS — Z85.828 PERSONAL HISTORY OF SKIN CANCER: ICD-10-CM

## 2018-06-25 DIAGNOSIS — L57.0 AK (ACTINIC KERATOSIS): ICD-10-CM

## 2018-06-25 DIAGNOSIS — D22.9 NEVUS: ICD-10-CM

## 2018-06-25 DIAGNOSIS — L82.1 SK (SEBORRHEIC KERATOSIS): ICD-10-CM

## 2018-06-25 DIAGNOSIS — D18.00 ANGIOMA: Primary | ICD-10-CM

## 2018-06-25 PROCEDURE — 99214 OFFICE O/P EST MOD 30 MIN: CPT | Mod: 25,S$PBB,, | Performed by: DERMATOLOGY

## 2018-06-25 PROCEDURE — 99999 PR PBB SHADOW E&M-EST. PATIENT-LVL II: CPT | Mod: PBBFAC,,, | Performed by: DERMATOLOGY

## 2018-06-25 PROCEDURE — 17000 DESTRUCT PREMALG LESION: CPT | Mod: S$PBB,,, | Performed by: DERMATOLOGY

## 2018-06-25 PROCEDURE — 17003 DESTRUCT PREMALG LES 2-14: CPT | Mod: S$PBB,,, | Performed by: DERMATOLOGY

## 2018-06-25 PROCEDURE — 17003 DESTRUCT PREMALG LES 2-14: CPT | Mod: PBBFAC,PO | Performed by: DERMATOLOGY

## 2018-06-25 PROCEDURE — 99212 OFFICE O/P EST SF 10 MIN: CPT | Mod: PBBFAC,PO,25 | Performed by: DERMATOLOGY

## 2018-06-25 PROCEDURE — 17000 DESTRUCT PREMALG LESION: CPT | Mod: PBBFAC,PO | Performed by: DERMATOLOGY

## 2018-06-25 NOTE — PROGRESS NOTES
Subjective:       Patient ID:  Gerda Lai is a 85 y.o. female who presents for   Chief Complaint   Patient presents with    Skin Check     High risk pt with hx NMSc here to check for the development of new lesions.  C/o lesion right lateral ankle . Present for several months.  Scaly. Not bleeding. No tx  Also has lesion on left neck.  Raised.  Was bleeding. No tx.           Review of Systems   Constitutional: Negative for fever, chills, fatigue and malaise.   Skin: Positive for daily sunscreen use.   Hematologic/Lymphatic: Bruises/bleeds easily.        Objective:    Physical Exam   Constitutional: She appears well-developed and well-nourished. No distress.   Neurological: She is alert and oriented to person, place, and time. She is not disoriented.   Psychiatric: She has a normal mood and affect.   Skin:   Areas Examined (abnormalities noted in diagram):   Scalp / Hair Palpated and Inspected  Head / Face Inspection Performed  Neck Inspection Performed  Chest / Axilla Inspection Performed  Abdomen Inspection Performed  Genitals / Buttocks / Groin Inspection Performed  Back Inspection Performed  RUE Inspected  LUE Inspection Performed  RLE Inspected  LLE Inspection Performed  Nails and Digits Inspection Performed                       Diagram Legend     Erythematous scaling macule/papule c/w actinic keratosis       Vascular papule c/w angioma      Pigmented verrucoid papule/plaque c/w seborrheic keratosis      Yellow umbilicated papule c/w sebaceous hyperplasia      Irregularly shaped tan macule c/w lentigo     1-2 mm smooth white papules consistent with Milia      Movable subcutaneous cyst with punctum c/w epidermal inclusion cyst      Subcutaneous movable cyst c/w pilar cyst      Firm pink to brown papule c/w dermatofibroma      Pedunculated fleshy papule(s) c/w skin tag(s)      Evenly pigmented macule c/w junctional nevus     Mildly variegated pigmented, slightly irregular-bordered macule c/w mildly  atypical nevus      Flesh colored to evenly pigmented papule c/w intradermal nevus       Pink pearly papule/plaque c/w basal cell carcinoma      Erythematous hyperkeratotic cursted plaque c/w SCC      Surgical scar with no sign of skin cancer recurrence      Open and closed comedones      Inflammatory papules and pustules      Verrucoid papule consistent consistent with wart     Erythematous eczematous patches and plaques     Dystrophic onycholytic nail with subungual debris c/w onychomycosis     Umbilicated papule    Erythematous-base heme-crusted tan verrucoid plaque consistent with inflamed seborrheic keratosis     Erythematous Silvery Scaling Plaque c/w Psoriasis     See annotation      Assessment / Plan:        Angioma  These are benign vascular lesions that are inherited.  Treatment is not necessary.    Lentigo  This is a benign hyperpigmented sun induced lesion. Daily sun protection will reduce the number of new lesions. Treatment of these benign lesions are considered cosmetic.  The nature of sun-induced photo-aging and skin cancers is discussed.  Sun avoidance, protective clothing, and the use of 30-SPF sunscreens is advised. Observe closely for skin damage/changes, and call if such occurs.    Nevus  Discussed ABCDE's of nevi.  Monitor for new mole or moles that are becoming bigger, darker, irritated, or developing irregular borders. Brochure provided.    SK (seborrheic keratosis)  These are benign inherited growths without a malignant potential. Reassurance given to patient. No treatment is necessary.     Personal history of skin cancer  Scar  Pt with history of non melanoma skin cancer  Total body skin examination performed today including at least 12 points as noted in physical examination. No suspicious lesions noted.    AK (actinic keratosis)  Cryosurgery Procedure Note    Verbal consent from the patient is obtained including, but not limited to, risk of hypopigmentation/hyperpigmentation, scar,  recurrence of lesion. The patient is aware of the precancerous quality and need for treatment of these lesions. Liquid nitrogen cryosurgery is applied to the 9 actinic keratoses, as detailed in the physical exam, to produce a freeze injury. The patient is aware that blisters may form and is instructed on wound care with gentle cleansing and use of vaseline ointment to keep moist until healed. The patient is supplied a handout on cryosurgery and is instructed to call if lesions do not completely resolve.             Follow-up in about 6 months (around 12/25/2018).

## 2018-07-03 ENCOUNTER — OFFICE VISIT (OUTPATIENT)
Dept: FAMILY MEDICINE | Facility: CLINIC | Age: 83
End: 2018-07-03
Payer: MEDICARE

## 2018-07-03 ENCOUNTER — LAB VISIT (OUTPATIENT)
Dept: LAB | Facility: HOSPITAL | Age: 83
End: 2018-07-03
Attending: FAMILY MEDICINE
Payer: MEDICARE

## 2018-07-03 VITALS
HEIGHT: 63 IN | BODY MASS INDEX: 24.65 KG/M2 | WEIGHT: 139.13 LBS | HEART RATE: 58 BPM | SYSTOLIC BLOOD PRESSURE: 122 MMHG | TEMPERATURE: 99 F | DIASTOLIC BLOOD PRESSURE: 60 MMHG

## 2018-07-03 DIAGNOSIS — I10 HYPERTENSION, ESSENTIAL: ICD-10-CM

## 2018-07-03 DIAGNOSIS — D50.9 IRON DEFICIENCY ANEMIA, UNSPECIFIED IRON DEFICIENCY ANEMIA TYPE: ICD-10-CM

## 2018-07-03 DIAGNOSIS — I10 ESSENTIAL HYPERTENSION: ICD-10-CM

## 2018-07-03 DIAGNOSIS — D50.9 IRON DEFICIENCY ANEMIA, UNSPECIFIED IRON DEFICIENCY ANEMIA TYPE: Primary | ICD-10-CM

## 2018-07-03 LAB
BASOPHILS # BLD AUTO: 0.04 K/UL
BASOPHILS NFR BLD: 0.5 %
DIFFERENTIAL METHOD: ABNORMAL
EOSINOPHIL # BLD AUTO: 0.2 K/UL
EOSINOPHIL NFR BLD: 2.6 %
ERYTHROCYTE [DISTWIDTH] IN BLOOD BY AUTOMATED COUNT: 12.5 %
HCT VFR BLD AUTO: 35.6 %
HGB BLD-MCNC: 11.8 G/DL
IMM GRANULOCYTES # BLD AUTO: 0.03 K/UL
IMM GRANULOCYTES NFR BLD AUTO: 0.4 %
LYMPHOCYTES # BLD AUTO: 2.2 K/UL
LYMPHOCYTES NFR BLD: 29.2 %
MCH RBC QN AUTO: 32.7 PG
MCHC RBC AUTO-ENTMCNC: 33.1 G/DL
MCV RBC AUTO: 99 FL
MONOCYTES # BLD AUTO: 0.8 K/UL
MONOCYTES NFR BLD: 11 %
NEUTROPHILS # BLD AUTO: 4.1 K/UL
NEUTROPHILS NFR BLD: 56.3 %
NRBC BLD-RTO: 0 /100 WBC
PLATELET # BLD AUTO: 194 K/UL
PMV BLD AUTO: 9.8 FL
RBC # BLD AUTO: 3.61 M/UL
WBC # BLD AUTO: 7.36 K/UL

## 2018-07-03 PROCEDURE — 85025 COMPLETE CBC W/AUTO DIFF WBC: CPT

## 2018-07-03 PROCEDURE — 99999 PR PBB SHADOW E&M-EST. PATIENT-LVL III: CPT | Mod: PBBFAC,,, | Performed by: FAMILY MEDICINE

## 2018-07-03 PROCEDURE — 36415 COLL VENOUS BLD VENIPUNCTURE: CPT | Mod: PO

## 2018-07-03 PROCEDURE — 99213 OFFICE O/P EST LOW 20 MIN: CPT | Mod: PBBFAC,PO | Performed by: FAMILY MEDICINE

## 2018-07-03 PROCEDURE — 99214 OFFICE O/P EST MOD 30 MIN: CPT | Mod: S$PBB,,, | Performed by: FAMILY MEDICINE

## 2018-07-03 RX ORDER — AMOXICILLIN 500 MG
1 CAPSULE ORAL DAILY
COMMUNITY
End: 2018-11-06

## 2018-07-03 RX ORDER — LORAZEPAM 0.5 MG/1
0.25 TABLET ORAL 2 TIMES DAILY
Qty: 60 TABLET | Refills: 1 | Status: SHIPPED | OUTPATIENT
Start: 2018-07-03 | End: 2018-07-05 | Stop reason: SDUPTHER

## 2018-07-03 RX ORDER — LABETALOL 100 MG/1
TABLET, FILM COATED ORAL
Qty: 360 TABLET | Refills: 1
Start: 2018-07-03 | End: 2018-07-23 | Stop reason: SDUPTHER

## 2018-07-03 NOTE — PROGRESS NOTES
Subjective:       Patient ID: Gerda Lai is a 85 y.o. female.    Chief Complaint: Hypertension (follow up)    Disclaimer: This note has been generated using voice-recognition software. There may be typographical errors that have been missed during proof-reading    86 yo presents today for follow up.  Seen 2 weeks ago with postural symptoms, which have now resolved after decrease in her dosage of Labetalol.    She has prior history of anemia during hospital stay for CAP in March of this year      Hypertension   Pertinent negatives include no chest pain, headaches, palpitations or shortness of breath.     Review of Systems   Constitutional: Negative for activity change, fatigue and unexpected weight change.   Respiratory: Negative for shortness of breath.    Cardiovascular: Negative for chest pain, palpitations and leg swelling.   Gastrointestinal: Negative for abdominal distention, abdominal pain and blood in stool.   Neurological: Negative for dizziness, weakness and headaches.   Hematological: Negative for adenopathy. Does not bruise/bleed easily.       Objective:      Physical Exam   Constitutional: She is oriented to person, place, and time. She appears well-developed and well-nourished. No distress.   Neck: No JVD present. No thyromegaly present.   Cardiovascular: Normal rate and regular rhythm.    No murmur heard.  Pulmonary/Chest: Breath sounds normal. She has no wheezes. She has no rales.   Musculoskeletal: She exhibits no edema.   Lymphadenopathy:     She has no cervical adenopathy.   Neurological: She is alert and oriented to person, place, and time.       Assessment:         1.  Hypertension, good control  2.  anemia  Plan:       1.  Repeat CBC today  2.  Continue present medications  3.  F/u 3 months

## 2018-07-09 RX ORDER — LORAZEPAM 0.5 MG/1
TABLET ORAL
Qty: 60 TABLET | Refills: 1 | Status: SHIPPED | OUTPATIENT
Start: 2018-07-09 | End: 2018-11-23 | Stop reason: SDUPTHER

## 2018-07-10 ENCOUNTER — TELEPHONE (OUTPATIENT)
Dept: FAMILY MEDICINE | Facility: CLINIC | Age: 83
End: 2018-07-10

## 2018-07-18 ENCOUNTER — TELEPHONE (OUTPATIENT)
Dept: HEMATOLOGY/ONCOLOGY | Facility: CLINIC | Age: 83
End: 2018-07-18

## 2018-07-18 NOTE — TELEPHONE ENCOUNTER
----- Message from Deshaun Meneses sent at 7/17/2018  4:42 PM CDT -----  Contact: Pt   Will to reschedule 7.23 mammo and appt with farhad hickey when dr cline is available     Contact::[phone]

## 2018-07-18 NOTE — TELEPHONE ENCOUNTER
Returned call, spoke with patient and explained who Kalpana Jama was, patient decided to keep her apts as is

## 2018-07-19 NOTE — PROGRESS NOTES
Subjective:       Patient ID: Gerda Lai is a 85 y.o. female.    Chief Complaint: Breast Cancer    HPI     Mrs. Lai returns today for follow up.      Mrs. Lai has a remote history of breast cancer 25 years ago.  At that time she had been diagnosed with cancer of the right breast. She was treated on an NSABP protocol with lumpectomy, radiation therapy, and tamoxifen. She has remained disease free since then.     A mammogram earlier today was read as BIRADS II, and a one year follow up was recommended.     Review of Systems  Overall she is feeling well and she has no complaints.  ECOG PS remains 0.  She denies any anxiety, depression, easy bruising, fevers, chills, night sweats, weight loss, nausea, vomiting, diarrhea, constipation, diplopia, blurred vision, headache, abdominal pain, or difficulty ambulating.     Objective:      Physical Exam  GENERAL: She is alert, oriented to time, place, person, pleasant, well nourished, in no acute physical distress.   VITAL SIGNS: Reviewed.   HEENT: Normal. There are no nasal, oral, lip, gingival, auricular, lid, or conjunctival lesions. Mucosae are moist and pink, and there is no thrush. Pupils are equal, reactive to light and accommodation.   Extraocular muscle movements are intact.   NECK: Supple without JVD, adenopathy, or thyromegaly.   LUNGS: Clear to auscultation without wheezing, rales, or rhonchi.   CARDIOVASCULAR: Reveals an S1, S2, no murmurs, no rubs, no gallops.   BREASTS: A lumpectomy scar is seen at the 9 o'clock position on the right breast and at the 2 o'clock position on the left breast. There are no breast masses in the left breast, while there is dense fibrosis underlying her right sided lumpectomy incision.  LYMPHATIC: There is no cervical, axillary, or supraclavicular adenopathy.   SKIN: Does not have induration, petechiae, rashes, or ecchymoses.   NEUROLOGIC: Motor function is 5/5, DTRs are 0-1+ bilaterally, symmetrical,   and cranial nerves  within normal limits.        Assessment:       1. Malignant neoplasm of nipple of right breast in female, estrogen receptor positive, clinically JEN, doing well.       Plan:         Patient is doing well and is clinically stable. RTC in 1 year to see Dr. Mcgill with a mammogram.     Patient is in agreement with the proposed treatment plan. All questions were answered to the patient's satisfaction. Pt knows to call clinic if anything is needed before the next clinic visit.      SARAI Spaulding-RAPHAEL  Hematology & Oncology  [facility] [place] [address]  ph. [phone]  Fax. [phone]     30 minutes were spent in coordination of patient's care, record review and counseling. More than 50% of the time was face-to-face.             Distress Screening Results: Psychosocial Distress screening score of Distress Score: 0 noted and reviewed. No intervention indicated.

## 2018-07-22 DIAGNOSIS — I10 ESSENTIAL HYPERTENSION: ICD-10-CM

## 2018-07-22 RX ORDER — LABETALOL 100 MG/1
TABLET, FILM COATED ORAL
Qty: 135 TABLET | Refills: 1 | Status: SHIPPED | OUTPATIENT
Start: 2018-07-22 | End: 2018-10-18 | Stop reason: SDUPTHER

## 2018-07-23 ENCOUNTER — OFFICE VISIT (OUTPATIENT)
Dept: HEMATOLOGY/ONCOLOGY | Facility: CLINIC | Age: 83
End: 2018-07-23
Payer: MEDICARE

## 2018-07-23 ENCOUNTER — HOSPITAL ENCOUNTER (OUTPATIENT)
Dept: RADIOLOGY | Facility: HOSPITAL | Age: 83
Discharge: HOME | End: 2018-07-23
Attending: INTERNAL MEDICINE
Payer: MEDICARE

## 2018-07-23 VITALS
WEIGHT: 141.56 LBS | TEMPERATURE: 98 F | SYSTOLIC BLOOD PRESSURE: 140 MMHG | DIASTOLIC BLOOD PRESSURE: 60 MMHG | HEIGHT: 63 IN | RESPIRATION RATE: 16 BRPM | OXYGEN SATURATION: 99 % | BODY MASS INDEX: 25.08 KG/M2 | HEART RATE: 56 BPM

## 2018-07-23 DIAGNOSIS — C50.011 MALIGNANT NEOPLASM OF NIPPLE OF RIGHT BREAST IN FEMALE, ESTROGEN RECEPTOR POSITIVE: ICD-10-CM

## 2018-07-23 DIAGNOSIS — C50.011 MALIGNANT NEOPLASM OF NIPPLE OF RIGHT BREAST IN FEMALE, UNSPECIFIED ESTROGEN RECEPTOR STATUS: Primary | ICD-10-CM

## 2018-07-23 DIAGNOSIS — Z17.0 MALIGNANT NEOPLASM OF NIPPLE OF RIGHT BREAST IN FEMALE, ESTROGEN RECEPTOR POSITIVE: ICD-10-CM

## 2018-07-23 PROCEDURE — 99214 OFFICE O/P EST MOD 30 MIN: CPT | Mod: PBBFAC | Performed by: NURSE PRACTITIONER

## 2018-07-23 PROCEDURE — 77062 BREAST TOMOSYNTHESIS BI: CPT | Mod: TC,PO

## 2018-07-23 PROCEDURE — 77062 BREAST TOMOSYNTHESIS BI: CPT | Mod: 26,,, | Performed by: RADIOLOGY

## 2018-07-23 PROCEDURE — 99213 OFFICE O/P EST LOW 20 MIN: CPT | Mod: S$PBB,,, | Performed by: NURSE PRACTITIONER

## 2018-07-23 PROCEDURE — 99999 PR PBB SHADOW E&M-EST. PATIENT-LVL IV: CPT | Mod: PBBFAC,,, | Performed by: NURSE PRACTITIONER

## 2018-07-23 PROCEDURE — 77066 DX MAMMO INCL CAD BI: CPT | Mod: 26,,, | Performed by: RADIOLOGY

## 2018-07-26 ENCOUNTER — NURSE TRIAGE (OUTPATIENT)
Dept: ADMINISTRATIVE | Facility: CLINIC | Age: 83
End: 2018-07-26

## 2018-07-26 ENCOUNTER — OFFICE VISIT (OUTPATIENT)
Dept: FAMILY MEDICINE | Facility: CLINIC | Age: 83
End: 2018-07-26
Payer: MEDICARE

## 2018-07-26 ENCOUNTER — LAB VISIT (OUTPATIENT)
Dept: LAB | Facility: HOSPITAL | Age: 83
End: 2018-07-26
Attending: FAMILY MEDICINE
Payer: MEDICARE

## 2018-07-26 VITALS
WEIGHT: 140.44 LBS | BODY MASS INDEX: 24.88 KG/M2 | TEMPERATURE: 98 F | HEIGHT: 63 IN | DIASTOLIC BLOOD PRESSURE: 60 MMHG | RESPIRATION RATE: 16 BRPM | SYSTOLIC BLOOD PRESSURE: 130 MMHG

## 2018-07-26 DIAGNOSIS — L65.9 HAIR LOSS: ICD-10-CM

## 2018-07-26 DIAGNOSIS — R09.89 LABILE HYPERTENSION: ICD-10-CM

## 2018-07-26 DIAGNOSIS — R09.89 LABILE HYPERTENSION: Primary | ICD-10-CM

## 2018-07-26 PROCEDURE — 36415 COLL VENOUS BLD VENIPUNCTURE: CPT | Mod: PO

## 2018-07-26 PROCEDURE — 80053 COMPREHEN METABOLIC PANEL: CPT

## 2018-07-26 PROCEDURE — 84443 ASSAY THYROID STIM HORMONE: CPT

## 2018-07-26 PROCEDURE — 99214 OFFICE O/P EST MOD 30 MIN: CPT | Mod: PBBFAC,PO | Performed by: FAMILY MEDICINE

## 2018-07-26 PROCEDURE — 99214 OFFICE O/P EST MOD 30 MIN: CPT | Mod: S$PBB,,, | Performed by: FAMILY MEDICINE

## 2018-07-26 PROCEDURE — 99999 PR PBB SHADOW E&M-EST. PATIENT-LVL IV: CPT | Mod: PBBFAC,,, | Performed by: FAMILY MEDICINE

## 2018-07-26 NOTE — TELEPHONE ENCOUNTER
Reason for Disposition   Patient wants to be seen    Protocols used: ST DIZZINESS-A-OH    Gerda is calling to request an appointment due to dizziness.  She is able to stand and walk unsupported.  States blood pressure has been running high but unable to check it this AM due to machine not working.  Appointment scheduled.

## 2018-07-27 ENCOUNTER — NURSE TRIAGE (OUTPATIENT)
Dept: ADMINISTRATIVE | Facility: CLINIC | Age: 83
End: 2018-07-27

## 2018-07-27 LAB
ALBUMIN SERPL BCP-MCNC: 3.9 G/DL
ALP SERPL-CCNC: 60 U/L
ALT SERPL W/O P-5'-P-CCNC: 15 U/L
ANION GAP SERPL CALC-SCNC: 10 MMOL/L
AST SERPL-CCNC: 24 U/L
BILIRUB SERPL-MCNC: 1.6 MG/DL
BUN SERPL-MCNC: 23 MG/DL
CALCIUM SERPL-MCNC: 9.7 MG/DL
CHLORIDE SERPL-SCNC: 108 MMOL/L
CO2 SERPL-SCNC: 24 MMOL/L
CREAT SERPL-MCNC: 1 MG/DL
EST. GFR  (AFRICAN AMERICAN): 59.4 ML/MIN/1.73 M^2
EST. GFR  (NON AFRICAN AMERICAN): 51.5 ML/MIN/1.73 M^2
GLUCOSE SERPL-MCNC: 112 MG/DL
POTASSIUM SERPL-SCNC: 3.9 MMOL/L
PROT SERPL-MCNC: 6.5 G/DL
SODIUM SERPL-SCNC: 142 MMOL/L
TSH SERPL DL<=0.005 MIU/L-ACNC: 1.05 UIU/ML

## 2018-07-27 NOTE — PROGRESS NOTES
Subjective:       Patient ID: Gerda Lai is a 85 y.o. female.    Chief Complaint: Dizziness  pt recently started on spironalactone she feels it is making her dizzy  She would like to get a referral to cardiology because of her labile htn  And intolerance to multiple htn medications.  HPI see above  Review of Systems   Constitutional: Negative.    HENT: Negative.    Eyes: Negative.    Respiratory: Negative.    Cardiovascular:        Labile htn   Gastrointestinal: Negative.    Endocrine: Negative.    Genitourinary: Negative.    Musculoskeletal: Negative.    Skin: Negative.    Allergic/Immunologic: Negative.    Neurological: Positive for dizziness.   Hematological: Negative.    Psychiatric/Behavioral: Negative.        Objective:      Physical Exam   Constitutional: She is oriented to person, place, and time. She appears well-developed and well-nourished. No distress.   HENT:   Head: Normocephalic and atraumatic.   Eyes: Conjunctivae and EOM are normal. Pupils are equal, round, and reactive to light.   Neck: Normal range of motion. Neck supple. No JVD present.   Cardiovascular: Normal rate, regular rhythm and normal heart sounds.    No murmur heard.  Pulmonary/Chest: Effort normal and breath sounds normal. No respiratory distress.   Abdominal: Soft. Bowel sounds are normal. She exhibits no distension. There is no tenderness. There is no guarding.   Neurological: She is alert and oriented to person, place, and time. She displays normal reflexes. No cranial nerve deficit or sensory deficit. She exhibits normal muscle tone. Coordination normal.   Skin: Skin is warm and dry. No rash noted. She is not diaphoretic. No erythema. No pallor.   Psychiatric: She has a normal mood and affect. Her behavior is normal. Judgment and thought content normal.   Nursing note reviewed.      Assessment:       1. Labile hypertension    2. Hair loss     3.     Adverse rxn to antihypertension  Plan:     see med card dated 7-26-18  TEKTURNA  300 mg Tab 300 mg, Daily         spironolactone (ALDACTONE) 50 MG tablet 50 mg, Daily        pravastatin (PRAVACHOL) 40 MG tablet         omeprazole (PRILOSEC OTC) 20 MG tablet 20 mg, Daily        multivitamin capsule 1 capsule, Daily        LORazepam (ATIVAN) 0.5 MG tablet         labetalol (NORMODYNE) 100 MG tablet         ipratropium (ATROVENT) 0.03 % nasal spray 2 spray, 2 times daily PRN        glucosamine-chondroitin 500-400 mg tablet 1 tablet, Daily        fish oil-omega-3 fatty acids 300-1,000 mg capsule 1 capsule, Daily        coenzyme Q10 (CO Q-10) 100 mg capsule 100 mg, Daily        B INFANTIS/B ANI/B JOSE/B BIFID (PROBIOTIC 4X ORAL)         ascorbic acid (VITAMIN C) 100 MG tablet 100 mg, 2 times daily        acetaminophen (TYLENOL) 650 MG TbSR 650 mg, As needed (PRN         See orders :   TSH         Comprehensive metabolic panel         Ambulatory consult to Cardiology        Will contact pt with results when available.

## 2018-07-27 NOTE — TELEPHONE ENCOUNTER
"  Reason for Disposition   Caller has medication question about med not prescribed by PCP and triager unable to answer question (e.g., compatibility with other med, storage)    Answer Assessment - Initial Assessment Questions  1. SYMPTOMS: "Do you have any symptoms?"      Sore throat  2. SEVERITY: If symptoms are present, ask "Are they mild, moderate or severe?"      moderate    Protocols used: ST MEDICATION QUESTION CALL-A-  patient called with concerns that she may be having a reaction to spironolactone because she has a sore throat. Patient advised to call her pharmacist since she does not have severe symptoms at this time. She verbalized understanding.   "

## 2018-08-07 ENCOUNTER — OFFICE VISIT (OUTPATIENT)
Dept: CARDIOLOGY | Facility: CLINIC | Age: 83
End: 2018-08-07
Payer: MEDICARE

## 2018-08-07 ENCOUNTER — TELEPHONE (OUTPATIENT)
Dept: ORTHOPEDICS | Facility: CLINIC | Age: 83
End: 2018-08-07

## 2018-08-07 VITALS
BODY MASS INDEX: 25.21 KG/M2 | WEIGHT: 142.31 LBS | HEIGHT: 63 IN | DIASTOLIC BLOOD PRESSURE: 68 MMHG | SYSTOLIC BLOOD PRESSURE: 160 MMHG | HEART RATE: 53 BPM

## 2018-08-07 DIAGNOSIS — I10 ELEVATED BLOOD PRESSURE READING IN OFFICE WITH DIAGNOSIS OF HYPERTENSION: ICD-10-CM

## 2018-08-07 DIAGNOSIS — I10 HYPERTENSION, ESSENTIAL: Primary | ICD-10-CM

## 2018-08-07 DIAGNOSIS — E78.00 PURE HYPERCHOLESTEROLEMIA: ICD-10-CM

## 2018-08-07 PROCEDURE — 99999 PR PBB SHADOW E&M-EST. PATIENT-LVL III: CPT | Mod: PBBFAC,,, | Performed by: INTERNAL MEDICINE

## 2018-08-07 PROCEDURE — 99213 OFFICE O/P EST LOW 20 MIN: CPT | Mod: S$PBB,,, | Performed by: INTERNAL MEDICINE

## 2018-08-07 PROCEDURE — 99213 OFFICE O/P EST LOW 20 MIN: CPT | Mod: PBBFAC,PO | Performed by: INTERNAL MEDICINE

## 2018-08-07 RX ORDER — SPIRONOLACTONE 50 MG/1
50 TABLET, FILM COATED ORAL DAILY
Qty: 30 TABLET | Refills: 11 | Status: SHIPPED | OUTPATIENT
Start: 2018-08-07 | End: 2019-03-28 | Stop reason: SDUPTHER

## 2018-08-07 NOTE — LETTER
August 13, 2018      Lucy Turner MD  [address] [facility]  [address]  [facility] [address]           [place] - Cardiology  [address] [place] [address]  Phone: [phone]          Patient: Gerda Lai   MR Number: [id number]   YOB: 1933   Date of Visit: 8/7/2018       Dear Dr. Lucy Turner:    Thank you for referring Gerda Lai to me for evaluation. Attached you will find relevant portions of my assessment and plan of care.    If you have questions, please do not hesitate to call me. I look forward to following Gerda Lai along with you.    Sincerely,    Lizeth Camarena MD    Enclosure  CC:  No Recipients    If you would like to receive this communication electronically, please contact [email or web address] or [phone] to request more information on [employer] access.    For providers and/or their staff who would like to refer a patient to [facility], please contact us through our one-stop-shop provider referral line, [facility] , at [phone].    If you feel you have received this communication in error or would no longer like to receive these types of communications, please e-mail [email or web address]

## 2018-08-07 NOTE — TELEPHONE ENCOUNTER
----- Message from Bianca Hu PA-C sent at 8/7/2018  1:06 PM CDT -----  Contact: self   She can have a cortisone injection. Not the euflexxa injections. You can add her on today.    Bianca Del Valle   ----- Message -----  From: Raina David MA  Sent: 8/7/2018  12:11 PM  To: Bianca Hu PA-C        ----- Message -----  From: Bhargav Torres  Sent: 8/7/2018   9:16 AM  To: Fritz Valenzuela Staff    Pt is requesting a call back to schedule an appt for left knee injections. Pt is requesting to be seen today. Pt can reached at  [phone].

## 2018-08-07 NOTE — PROGRESS NOTES
Subjective:   Patient ID:  Gerda Lai is a 85 y.o. female who presents for follow-up of Hypertension      Problem List:  Hypertension  - intolerance to multiple antihypertensive meds  Breast cancer    HPI:   Gerda Lai has difficult to control hypertension. In addition finds that anything less than 140 mm Hg will make her feel weak.  She has not been checking her blood pressure at home.  She uses a BP monitor at a drug store.   She stopped taking spironolactone but a week ago. She was concerned that she was having cramps in her legs x 2 times, palpitations and diarrhea.  Her blood pressure was elevated recently and is also elevated in clinic today.  She soaked her feet in salt water and wondered if that was the cause of the elevated blood pressure.      ROS   Negative except as per HPI.      Current Outpatient Prescriptions   Medication Sig    acetaminophen (TYLENOL) 650 MG TbSR Take 650 mg by mouth as needed (pain).     ascorbic acid (VITAMIN C) 100 MG tablet Take 100 mg by mouth 2 (two) times daily.     B INFANTIS/B ANI/B JOSE/B BIFID (PROBIOTIC 4X ORAL) Take 1 tablet by mouth daily    coenzyme Q10 (CO Q-10) 100 mg capsule Take 100 mg by mouth once daily.    fish oil-omega-3 fatty acids 300-1,000 mg capsule Take 1 capsule by mouth once daily.    glucosamine-chondroitin 500-400 mg tablet Take 1 tablet by mouth once daily.     ipratropium (ATROVENT) 0.03 % nasal spray 2 sprays by Nasal route 2 (two) times daily as needed.    labetalol (NORMODYNE) 100 MG tablet Pt taking one pill twice a day.    LORazepam (ATIVAN) 0.5 MG tablet TAKE 1/2 TABLET BY MOUTH TWICE A DAY    multivitamin capsule Take 1 capsule by mouth once daily.    omeprazole (PRILOSEC OTC) 20 MG tablet Take 20 mg by mouth once daily.      pravastatin (PRAVACHOL) 40 MG tablet TAKE 1 TABLET (40 MG TOTAL) BY MOUTH ONCE DAILY.    TEKTURNA 300 mg Tab Take 300 mg by mouth once daily.      No current facility-administered medications for  "this visit.        Social History   Substance Use Topics    Smoking status: Never Smoker    Smokeless tobacco: Never Used    Alcohol use No      Comment: socially         Objective:     Physical Exam   Constitutional: She is oriented to person, place, and time. She appears well-developed and well-nourished.   BP (!) 160/68   Pulse (!) 53   Ht 5' 3" (1.6 m)   Wt 64.5 kg (142 lb 4.9 oz)   BMI 25.21 kg/m²      Neck: No JVD present.   Cardiovascular: Normal rate and regular rhythm.    No murmur heard.  Pulses:       Radial pulses are 1+ on the right side, and 3+ on the left side.        Posterior tibial pulses are 1+ on the right side, and 1+ on the left side.   Brachial pulse 1+ on the right and 3+ on the left.   Pulmonary/Chest: She has no decreased breath sounds. She has no wheezes. She has no rales. Chest wall is not dull to percussion.   Abdominal: Soft. Normal appearance. There is no splenomegaly or hepatomegaly. There is no tenderness.   Musculoskeletal:        Right lower leg: She exhibits no edema.        Left lower leg: She exhibits no edema.   Neurological: She is alert and oriented to person, place, and time.   Skin: Skin is warm. No bruising noted. Nails show no clubbing.   Psychiatric: Her speech is normal and behavior is normal. Cognition and memory are normal.           Lab Results   Component Value Date    CHOL 141 03/16/2018    HDL 37 (L) 03/16/2018    LDLCALC 66.8 03/16/2018    TRIG 186 (H) 03/16/2018    CHOLHDL 26.2 03/16/2018     Lab Results   Component Value Date     (H) 07/26/2018    CREATININE 1.0 07/26/2018    BUN 23 07/26/2018     07/26/2018    K 3.9 07/26/2018     07/26/2018    CO2 24 07/26/2018     Lab Results   Component Value Date    ALT 15 07/26/2018    AST 24 07/26/2018    ALKPHOS 60 07/26/2018    BILITOT 1.6 (H) 07/26/2018           Assessment and Plan:     Hypertension, essential  Elevated blood pressure reading in office with diagnosis of hypertension  -     " NURSING COMMUNICATION: Create [facility] Account  -     Hypertension Digital Medicine (HDMP) Enrollment Order  -     Hypertension Digital Medicine (HDMP): Assign Onboarding Questionnaires   Check blood pressure in left arm.   Low-sodium diet.  - resume spironolactone (ALDACTONE) 50 MG tablet; Take 1 tablet (50 mg total) by mouth once daily.  Dispense: 30 tablet; Refill: 11       Pure hypercholesterolemia   Continue pravastatin.    Follow-up in about 3 months (around 11/7/2018) w NP and w me in 9 months.

## 2018-08-09 ENCOUNTER — OFFICE VISIT (OUTPATIENT)
Dept: ORTHOPEDICS | Facility: CLINIC | Age: 83
End: 2018-08-09
Payer: MEDICARE

## 2018-08-09 VITALS — HEIGHT: 63 IN | BODY MASS INDEX: 25.35 KG/M2 | WEIGHT: 143.06 LBS

## 2018-08-09 DIAGNOSIS — M17.12 PRIMARY OSTEOARTHRITIS OF LEFT KNEE: ICD-10-CM

## 2018-08-09 DIAGNOSIS — M70.62 TROCHANTERIC BURSITIS OF LEFT HIP: Primary | ICD-10-CM

## 2018-08-09 PROCEDURE — 20610 DRAIN/INJ JOINT/BURSA W/O US: CPT | Mod: PBBFAC | Performed by: PHYSICIAN ASSISTANT

## 2018-08-09 PROCEDURE — 20610 DRAIN/INJ JOINT/BURSA W/O US: CPT | Mod: S$PBB,LT,, | Performed by: PHYSICIAN ASSISTANT

## 2018-08-09 PROCEDURE — 99213 OFFICE O/P EST LOW 20 MIN: CPT | Mod: PBBFAC,25 | Performed by: PHYSICIAN ASSISTANT

## 2018-08-09 PROCEDURE — 99999 PR PBB SHADOW E&M-EST. PATIENT-LVL III: CPT | Mod: PBBFAC,,, | Performed by: PHYSICIAN ASSISTANT

## 2018-08-09 PROCEDURE — 99214 OFFICE O/P EST MOD 30 MIN: CPT | Mod: 25,S$PBB,, | Performed by: PHYSICIAN ASSISTANT

## 2018-08-09 RX ORDER — METHYLPREDNISOLONE ACETATE 80 MG/ML
80 INJECTION, SUSPENSION INTRA-ARTICULAR; INTRALESIONAL; INTRAMUSCULAR; SOFT TISSUE
Status: COMPLETED | OUTPATIENT
Start: 2018-08-09 | End: 2018-08-09

## 2018-08-09 RX ADMIN — METHYLPREDNISOLONE ACETATE 80 MG: 80 INJECTION, SUSPENSION INTRALESIONAL; INTRAMUSCULAR; INTRASYNOVIAL; SOFT TISSUE at 08:08

## 2018-08-10 NOTE — PROGRESS NOTES
Subjective:      Patient ID: Gerda Lai is a 85 y.o. female.    Chief Complaint: No chief complaint on file.    HPI  85 year old female presents with chief complaint of left hip and left knee pain. No trauma. She has h/o hip bursitis and has received cortisone injections in the past with good relief. Pain is lateral. She is taking tylenol without much relief. She has h/o knee OA and has received cortisone injections for this with good relief. Last knee injection was in March.   Review of Systems   Constitution: Negative for chills, fever and night sweats.   Cardiovascular: Negative for chest pain.   Respiratory: Negative for cough and shortness of breath.    Hematologic/Lymphatic: Does not bruise/bleed easily.   Skin: Negative for color change.   Gastrointestinal: Negative for heartburn.   Genitourinary: Negative for dysuria.   Neurological: Negative for numbness and paresthesias.   Psychiatric/Behavioral: Negative for altered mental status.   Allergic/Immunologic: Negative for persistent infections.         Objective:                    Left Hip Exam     Tenderness   The patient tender to palpation of the trochanteric bursa.    Range of Motion   The patient has normal left hip ROM.    Tests   Stinchfield test: negative  Log Roll: negative    Other   Sensation: normal    Comments:  No pain with hip ROM.           Vascular Exam       Left Pulses  Dorsalis Pedis:      2+            Left Knee Exam:  ROM 0-110 degrees  No effusion  +crepitus  TTP lateral joint line        X-ray knee: reviewed by myself. DJD present.    X-ray hip: reviewed by myself. No fx or dislocation.       Assessment:       Encounter Diagnoses   Name Primary?    Trochanteric bursitis of left hip Yes    Primary osteoarthritis of left knee           Plan:       Discussed treatment options with patient. She would like injections today for the knee and hip. HEP given. RTC prn.     PROCEDURE:  I have explained the risks, benefits, and alternatives  of the procedure in detail.  The patient voices understanding and all questions have been answered.  The patient agrees to proceed as planned. So after I performed a sterile prep of the skin in the normal fashion the left knee is injected using a 22 gauge needle from the anterolateral approach with a combination of 4cc 1% plain lidocaine and 80 mg of depo medrol.  The patient is cautioned and immediate relief of pain is secondary to the local anesthetic and will be temporary.  After the anesthetic wears off there may be a increase in pain that may last for a few hours or a few days and they should use ice to help alleviate this flair up of pain.     PROCEDURE:  I have explained the risks, benefits, and alternatives of the procedure in detail.  The patient voices understanding and all questions have been answered.  The patient agrees to proceed as planned. So after I performed a sterile pre of the skin in the normal fashion the left greater trochanteric area is injected from the lateral approach using an 2 inch 22 gauge needle with a combination of 4cc 1% lidocaine and 80 mg of depo medrol.  The patient is cautioned and immediate relief of pain is secondary to the local anesthetic and will be temporary.  After the anesthetic wears off there may be a increase in pain that may last for a few hours or a few days and they should use ice to help alleviate this flair up of pain.

## 2018-08-14 ENCOUNTER — OFFICE VISIT (OUTPATIENT)
Dept: OPTOMETRY | Facility: CLINIC | Age: 83
End: 2018-08-14
Payer: MEDICARE

## 2018-08-14 DIAGNOSIS — H52.4 PRESBYOPIA: ICD-10-CM

## 2018-08-14 DIAGNOSIS — Z13.5 SCREENING FOR GLAUCOMA: ICD-10-CM

## 2018-08-14 DIAGNOSIS — H04.123 DRY EYES, BILATERAL: Primary | ICD-10-CM

## 2018-08-14 PROCEDURE — 99999 PR PBB SHADOW E&M-EST. PATIENT-LVL II: CPT | Mod: PBBFAC,,, | Performed by: OPTOMETRIST

## 2018-08-14 PROCEDURE — 99212 OFFICE O/P EST SF 10 MIN: CPT | Mod: PBBFAC,PO | Performed by: OPTOMETRIST

## 2018-08-14 PROCEDURE — 92015 DETERMINE REFRACTIVE STATE: CPT | Mod: ,,, | Performed by: OPTOMETRIST

## 2018-08-14 PROCEDURE — 92014 COMPRE OPH EXAM EST PT 1/>: CPT | Mod: S$PBB,,, | Performed by: OPTOMETRIST

## 2018-08-14 RX ORDER — AMOXICILLIN 500 MG/1
CAPSULE ORAL
COMMUNITY
Start: 2018-08-09 | End: 2018-08-21

## 2018-08-14 NOTE — PROGRESS NOTES
HPI     DLS: 6/12/2017  Pt states she reading might be changing --spex from last year    No f/f    AT ou PRN     Last edited by Luis Daniel Linares, OD on 8/14/2018  1:47 PM. (History)        ROS     Positive for: Eyes (cat surgery OU)    Negative for: Constitutional, Gastrointestinal, Neurological, Skin,   Genitourinary, Musculoskeletal, HENT, Endocrine, Cardiovascular,   Respiratory, Psychiatric, Allergic/Imm, Heme/Lymph    Last edited by Luis Daniel Linares, OD on 8/14/2018  1:46 PM. (History)        Assessment /Plan     For exam results, see Encounter Report.    Dry eyes, bilateral    Screening for glaucoma    Presbyopia      1. Mild pco OD sp pciol ou/YAG OS--wrote new spex Rx  2. DAMION/rosacea--pt to cont w ATs       Plan:    rtc 1 yr

## 2018-08-21 ENCOUNTER — OFFICE VISIT (OUTPATIENT)
Dept: RHEUMATOLOGY | Facility: CLINIC | Age: 83
End: 2018-08-21
Payer: MEDICARE

## 2018-08-21 VITALS
HEIGHT: 64 IN | HEART RATE: 62 BPM | DIASTOLIC BLOOD PRESSURE: 58 MMHG | WEIGHT: 140.38 LBS | SYSTOLIC BLOOD PRESSURE: 97 MMHG | BODY MASS INDEX: 23.97 KG/M2

## 2018-08-21 DIAGNOSIS — M19.071 PRIMARY OSTEOARTHRITIS OF BOTH FEET: ICD-10-CM

## 2018-08-21 DIAGNOSIS — M70.62 TROCHANTERIC BURSITIS, LEFT HIP: ICD-10-CM

## 2018-08-21 DIAGNOSIS — M75.102 ROTATOR CUFF TEAR ARTHROPATHY, LEFT: Primary | ICD-10-CM

## 2018-08-21 DIAGNOSIS — M12.812 ROTATOR CUFF TEAR ARTHROPATHY, LEFT: Primary | ICD-10-CM

## 2018-08-21 DIAGNOSIS — M17.0 PRIMARY OSTEOARTHRITIS OF BOTH KNEES: ICD-10-CM

## 2018-08-21 DIAGNOSIS — M19.072 PRIMARY OSTEOARTHRITIS OF BOTH FEET: ICD-10-CM

## 2018-08-21 PROCEDURE — 99214 OFFICE O/P EST MOD 30 MIN: CPT | Mod: PBBFAC | Performed by: INTERNAL MEDICINE

## 2018-08-21 PROCEDURE — 99999 PR PBB SHADOW E&M-EST. PATIENT-LVL IV: CPT | Mod: PBBFAC,,, | Performed by: INTERNAL MEDICINE

## 2018-08-21 PROCEDURE — 99212 OFFICE O/P EST SF 10 MIN: CPT | Mod: S$PBB,,, | Performed by: INTERNAL MEDICINE

## 2018-08-21 ASSESSMENT — ROUTINE ASSESSMENT OF PATIENT INDEX DATA (RAPID3)
MDHAQ FUNCTION SCORE: .6
WHEN YOU AWAKENED IN THE MORNING OVER THE LAST WEEK, PLEASE INDICATE THE AMOUNT OF TIME IT TAKES UNTIL YOU ARE AS LIMBER AS YOU WILL BE FOR THE DAY: 15 MINS
PSYCHOLOGICAL DISTRESS SCORE: 2.2
FATIGUE SCORE: 0
PAIN SCORE: 6
PATIENT GLOBAL ASSESSMENT SCORE: 6
TOTAL RAPID3 SCORE: 4.67
AM STIFFNESS SCORE: 1, YES

## 2018-08-21 NOTE — PROGRESS NOTES
Internal Medicine Clinic Note      Subjective:       Patient ID: Gerda Lai is a 85 y.o. female being seen for an established visit.    Chief Complaint: No chief complaint on file.      HPI   Patient is a 85 y.o female with osteoarthritis, left trochanteric bursitis, and HTN that presents to clinic for follow up. Patient reports that her pain has remained mostly unchanged although she does not slight improvements with her home exercise. On her last visit, she was referred to physical therapy but did not go because of poor blood pressure control. She reports that she does about 10 min of exercise a day at home and that this helps control her pain slightly. In addition to her injection in her shoulder at her last visit, she also received additional injections to her left trochanteric bursa as well as her left knee by orthopedics. She reports that the injections helped for a limited amount of time. She is interested in trying physical therapy.    At time of exam, patient endorses diffuse joint pains including shoulder and hip pain as well as mild tingling down her left leg that comes and goes. She denies headaches, dizziness, chest pain, SOB, abdominal pain, nausea or vomiting.      Past Medical History:   Diagnosis Date    Arthritis     Basal cell carcinoma 09/2016    right post auricular neck     Breast cancer 1992    right    Cataract     Fibromyalgia     Hyperlipidemia     Hypertension     Personal history of colonic polyps     Pneumonia     SCC (squamous cell carcinoma) 2015    R chest    SCC (squamous cell carcinoma) 2016    left medial shoulder    SCC (squamous cell carcinoma) 2017    left knee    Squamous cell carcinoma 2015    right forearm    Thyroid disease     Vaginitis        Past Surgical History:   Procedure Laterality Date    ADENOIDECTOMY      BREAST BIOPSY Left     Excisional bx, benign    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    CATARACT EXTRACTION W/  INTRAOCULAR LENS IMPLANT  Bilateral     thyriodectomy      partial    tonsillectomy      TOTAL HIP ARTHROPLASTY      right    Yag  Left        Family History   Problem Relation Age of Onset    Breast cancer Mother     Stroke Paternal Grandfather     Heart disease Father     Cancer Paternal Aunt     Heart disease Paternal Aunt     Glaucoma Paternal Grandmother     Amblyopia Neg Hx     Blindness Neg Hx     Cataracts Neg Hx     Diabetes Neg Hx     Hypertension Neg Hx     Macular degeneration Neg Hx     Retinal detachment Neg Hx     Strabismus Neg Hx     Thyroid disease Neg Hx     Melanoma Neg Hx        Social History     Socioeconomic History    Marital status: Single     Spouse name: None    Number of children: None    Years of education: None    Highest education level: None   Social Needs    Financial resource strain: None    Food insecurity - worry: None    Food insecurity - inability: None    Transportation needs - medical: None    Transportation needs - non-medical: None   Occupational History     Employer: RETIRED   Tobacco Use    Smoking status: Never Smoker    Smokeless tobacco: Never Used   Substance and Sexual Activity    Alcohol use: No     Alcohol/week: 0.0 oz     Types: 2 - 3 Glasses of wine per week     Comment: socially    Drug use: No    Sexual activity: No   Other Topics Concern    Are you pregnant or think you may be? Not Asked    Breast-feeding Not Asked   Social History Narrative    None       Review of Systems   Constitutional: Negative for chills, fatigue and fever.   Respiratory: Negative for cough, chest tightness and shortness of breath.    Cardiovascular: Negative for chest pain, palpitations and leg swelling.   Gastrointestinal: Negative for abdominal pain, constipation, diarrhea and nausea.   Musculoskeletal: Positive for arthralgias and joint swelling. Negative for back pain and myalgias.   Skin: Negative for color change, pallor and rash.   Neurological: Negative for dizziness,  weakness and headaches.       Patient's Medications   New Prescriptions    No medications on file   Previous Medications    ACETAMINOPHEN (TYLENOL) 650 MG TBSR    Take 650 mg by mouth as needed (pain).     ASCORBIC ACID (VITAMIN C) 100 MG TABLET    Take 100 mg by mouth 2 (two) times daily.     B INFANTIS/B ANI/B JOSE/B BIFID (PROBIOTIC 4X ORAL)    Take 1 tablet by mouth daily    COENZYME Q10 (CO Q-10) 100 MG CAPSULE    Take 100 mg by mouth once daily.    FISH OIL-OMEGA-3 FATTY ACIDS 300-1,000 MG CAPSULE    Take 1 capsule by mouth once daily.    GLUCOSAMINE-CHONDROITIN 500-400 MG TABLET    Take 1 tablet by mouth once daily.     IPRATROPIUM (ATROVENT) 0.03 % NASAL SPRAY    2 sprays by Nasal route 2 (two) times daily as needed.    LABETALOL (NORMODYNE) 100 MG TABLET    TAKE 1 AND 1/2 TABLETS BY MOUTH TWICE A DAY    LORAZEPAM (ATIVAN) 0.5 MG TABLET    TAKE 1/2 TABLET BY MOUTH TWICE A DAY    MULTIVITAMIN CAPSULE    Take 1 capsule by mouth once daily.    OMEPRAZOLE (PRILOSEC OTC) 20 MG TABLET    Take 20 mg by mouth once daily.      PRAVASTATIN (PRAVACHOL) 40 MG TABLET    TAKE 1 TABLET (40 MG TOTAL) BY MOUTH ONCE DAILY.    SPIRONOLACTONE (ALDACTONE) 50 MG TABLET    Take 1 tablet (50 mg total) by mouth once daily.    TEKTURNA 300 MG TAB    Take 300 mg by mouth once daily.    Modified Medications    No medications on file   Discontinued Medications    AMOXICILLIN (AMOXIL) 500 MG CAPSULE           Patient Active Problem List    Diagnosis Date Noted    Rotator cuff tear arthropathy, right 05/08/2018    Left leg weakness 02/07/2018    Other spondylosis with radiculopathy, lumbar region 10/20/2017    Tear of right infraspinatus tendon 10/20/2017    Greater trochanteric bursitis of right hip 10/20/2017    Osteoarthritis of left hip 04/21/2017    History of total right hip replacement 04/21/2017    Head trauma 03/07/2017    Osteoarthritis 01/11/2017    Paresthesia of left leg 01/11/2017    Trochanteric bursitis, left hip  "01/11/2017    Lumbosacral radiculopathy at L5 01/11/2017    Myalgia 12/22/2016    Personal history of skin cancer 06/15/2016     SCC right upper chest, BCC right postauricular neck      Malignant neoplasm of right breast 02/15/2016    Osteoarthritis of knees, bilateral 01/05/2016    Osteoarthritis of hands, bilateral 01/05/2016    Cervical spondylosis 01/05/2016    Chronic pain of left knee 01/05/2016    Hearing loss, sensorineural 08/04/2015    HLD (hyperlipidemia) 06/14/2015    Rotator cuff tear arthropathy, left 02/13/2015    Osteoarthritis of both feet 08/19/2014     The patient has hearing loss bilaterally left cerumen impaction may be affecting patient's hearing also.  Debrox was applied clearing ear canal patient states she can hear somewhat better now.  Follow-up for ear wash when necessary      Aftercataract - Right Eye 02/05/2014    Bilateral dry eyes - Both Eyes 09/26/2013    Personal history of breast cancer 07/17/2012    Hypertension, essential 07/17/2012     Note that she has an auscultatory gap             Objective:      BP (!) 97/58   Pulse 62   Ht 5' 3.6" (1.615 m)   Wt 63.7 kg (140 lb 6.4 oz)   BMI 24.40 kg/m²   Estimated body mass index is 24.4 kg/m² as calculated from the following:    Height as of this encounter: 5' 3.6" (1.615 m).    Weight as of this encounter: 63.7 kg (140 lb 6.4 oz).    Physical Exam   Constitutional: She is oriented to person, place, and time. She appears well-developed and well-nourished. No distress.   HENT:   Head: Normocephalic and atraumatic.   Cardiovascular: Normal rate, regular rhythm, normal heart sounds and intact distal pulses.   Pulmonary/Chest: Effort normal and breath sounds normal.   Abdominal: Soft. Bowel sounds are normal.   Musculoskeletal: She exhibits tenderness (mild, to back of right shoulder) and deformity. She exhibits no edema.   Neurological: She is alert and oriented to person, place, and time.   Skin: Skin is warm and dry. "   Psychiatric: She has a normal mood and affect. Her behavior is normal.   Vitals reviewed.         Assessment:         1. Rotator cuff tear arthropathy, left  Ambulatory Referral to Physical/Occupational Therapy   2. Primary osteoarthritis of both feet  Ambulatory Referral to Physical/Occupational Therapy   3. Primary osteoarthritis of both knees  Ambulatory Referral to Physical/Occupational Therapy   4. Trochanteric bursitis, left hip  Ambulatory Referral to Physical/Occupational Therapy         Plan:         1. Rotator cuff tear arthropathy, left  - Currently taking tylenol up to 3 times a day for pain  - Ambulatory Referral to Physical/Occupational Therapy    2. Primary osteoarthritis of both feet  - See rotator cuff tear arthropathy    3. Primary osteoarthritis of both knees  - See rotator cuff tear arthropathy    4. Trochanteric bursitis, left hip  - See rotator cuff tear arthropathy      Orders Placed This Encounter   Procedures    Ambulatory Referral to Physical/Occupational Therapy     Referral Priority:   Routine     Referral Type:   Physical Medicine     Referral Reason:   Specialty Services Required     Number of Visits Requested:   1             Patient seen and plan of care discussed with Dr. Manjeet Robison  Internal Medicine, PGY-1  [phone]

## 2018-08-21 NOTE — PROGRESS NOTES
I have personally taken the history and examined the patient and agree with the resident,s note as stated above

## 2018-09-07 ENCOUNTER — CLINICAL SUPPORT (OUTPATIENT)
Dept: REHABILITATION | Facility: HOSPITAL | Age: 83
End: 2018-09-07
Payer: MEDICARE

## 2018-09-07 DIAGNOSIS — M25.562 PAIN IN BOTH KNEES, UNSPECIFIED CHRONICITY: ICD-10-CM

## 2018-09-07 DIAGNOSIS — M25.561 PAIN IN BOTH KNEES, UNSPECIFIED CHRONICITY: ICD-10-CM

## 2018-09-07 DIAGNOSIS — M25.511 RIGHT SHOULDER PAIN, UNSPECIFIED CHRONICITY: ICD-10-CM

## 2018-09-07 PROCEDURE — 97161 PT EVAL LOW COMPLEX 20 MIN: CPT | Mod: PO

## 2018-09-07 PROCEDURE — 97530 THERAPEUTIC ACTIVITIES: CPT | Mod: PO

## 2018-09-07 NOTE — PROGRESS NOTES
[facility]  Physical Therapy Initial Evaluation    Name: Gerda Lai  Clinic Number: [id number]    Therapy Diagnosis:   Encounter Diagnoses   Name Primary?    Right shoulder pain, unspecified chronicity     Pain in both knees, unspecified chronicity      Physician: Clayton Robison MD    Physician Orders: PT Eval and Treat   Medical Diagnosis: R shoulder and B knee pain  Evaluation Date: 9/7/2018  Authorization period Expiration: 12/31/2018  Plan of Care Certification Period: 11/7/2018    Visit #: 1/ Visits authorized: 20  Time In:1:30  Time Out: 2:00  Total Billable Time: 60 minutes    Precautions: Standard, HTN  Subjective   Date of onset: Pt has a hx of B shoulder and B knee pain  History of current condition - Gerda reports: Pt reports that she fell several years ago injuring her knees and shoulders       Past Medical History:   Diagnosis Date    Arthritis     Basal cell carcinoma 09/2016    right post auricular neck     Breast cancer 1992    right    Cataract     Fibromyalgia     Hyperlipidemia     Hypertension     Personal history of colonic polyps     Pneumonia     SCC (squamous cell carcinoma) 2015    R chest    SCC (squamous cell carcinoma) 2016    left medial shoulder    SCC (squamous cell carcinoma) 2017    left knee    Squamous cell carcinoma 2015    right forearm    Thyroid disease     Vaginitis      Gerda Lai  has a past surgical history that includes thyriodectomy; Total hip arthroplasty; tonsillectomy; Adenoidectomy; Cataract extraction w/  intraocular lens implant (Bilateral); Yag  (Left); Breast lumpectomy (Right, 1992); Breast biopsy (Left); and COLONOSCOPY (N/A, 8/15/2013).    Gerda has a current medication list which includes the following prescription(s): acetaminophen, ascorbic acid (vitamin c), b infantis/b ani/b magali/b bifid, coenzyme q10, fish oil-omega-3 fatty acids, glucosamine-chondroitin, ipratropium, labetalol, lorazepam,  multivitamin, omeprazole, pravastatin, spironolactone, and tekturna.    Review of patient's allergies indicates:   Allergen Reactions    Sulfa (sulfonamide antibiotics) Rash    Spironolactone      Throat tightening    Clarithromycin Other (See Comments)     Weak, extreme fatigue. dizziness    Flexeril [cyclobenzaprine] Other (See Comments)     Dizziness    Iodine and iodide containing products     Lisinopril Other (See Comments)     Cough and sensation of throat swelling/?angioedema    Losartan Rash    Metoprolol Swelling     Tightness in throat    Tramadol Other (See Comments)     Dizzy and weak    Verapamil (bulk) Palpitations    Voltaren [diclofenac sodium] Other (See Comments)     Drops blood pressure        Imaging, MRI studies: X-rays    Prior Therapy: yes  Social History: Pt lives in a single story home with no staris lives alone  Occupation: retired  Prior Level of Function: Independent in ADL's  Current Level of Function: Pt reports difficulty fixing her hair secondary to limited R shoulder AROM.  Pt with difficulty ambulating secondary to B knee pain    Pain:  Current 2/10, worst 8/10, best 0/10   Location: knee  and shoulder  bilateral  Description: Aching  Aggravating Factors: Walking and reaching  Easing Factors: pain medication    Pts goals: decrease pain in R shoulder and B knees        Objective     Mental status :alert  Posture Alignment :slouched posture  Sensation: Light Touch: Intact B UE's             ROM:  UPPER EXTREMITY--AROM/PROM  (R) UE: Shoulder flexion 150, IR 45, ER 30  (L) UE:  Shoulder flexion 162, IR 70, ER 55           RANGE OF MOTION--LOWER EXTREMITIES   (R) LE: limited as follows: knee flexion 120, extension 5 deg, hip 90 hip precautions     (L) LE: limited as follows: Knee flexion 130, extension 7,       Upper Extremity Strength  (R) UE  (L) UE    Shoulder flexion: 3/5 Shoulder flexion: 3/5   Shoulder Abduction: 3-/5 Shoulder abduction: 3-/5   Elbow flexion: 4/5 Elbow  flexion: 4/5   Elbow extension: 3+/5 Elbow extension: 3+/5   Wrist flexion: 4/5 Wrist flexion: 4/5   Wrist extension: 4/5 Wrist extension: 4/5    4/5 : 4/5       Lower Extremity Strength  Right LE  Left LE    Knee extension: 3/5 Knee extension: 3/5   Knee flexion: 3-/5 Knee flexion: 3-/5   Hip flexion: 2/5 Hip flexion: 2/5   Hip extension:  nt Hip extension: ny   Hip abduction: 3/5 Hip abduction: 3/5   Hip adduction: 3-/5 Hip adduction: 2/5               Ankle dorsiflexion:   4+/5 Ankle dorsiflexion:   4+/5   Ankle plantarflexion: nt Ankle plantarflexion: nt     GAIT: Gerda ambulates 200 feet with SPC with independently.     GAIT DEVIATIONS: Gerda displays occasional unsteady gait          CMS Impairment/Limitation/Restriction for FOTO Shoulder Survey    Therapist reviewed FOTO scores for Gerda Lai on 9/7/2018.   FOTO documents entered into [employer] - see Media section.    Limitation Score: 60%  Category: Carrying    Current : CK = at least 40% but < 60% impaired, limited or restricted  Goal: CJ = at least 20% but < 40% impaired, limited or restricted  Discharge:          TREATMENT   Treatment Time In: 2;00  Treatment Time Out: 2:30  Total Treatment time separate from Evaluation time:30    Gerda received therapeutic exercises to develop strength and ROM for 15 minutes including:  Quad sets with towel roll B 10 x 2  Supine AA shoulder flexion with dowel x 10     Gerda received the following manual therapy techniques: Joint mobilizations and Soft tissue Mobilization were applied to the: R shoulder for 15  minutes.   Passive mobilization R GH joint  Soft tissue mobilization R pectorals    Education provided re: Add towel roll to quad sets and perform AA shoulder Flex with dowel x 10 with 10 sec stretch  - progress towards goals   - role of therapy in multi - disciplinary team, goals for therapy  No spiritual or educational barriers to learning provided    Written Home Exercises Provided: no.  Exercises  were reviewed and Gerda was able to demonstrate them prior to the end of the session.   Pt received a written copy of exercises to perform at home. Gerda demonstrated good  understanding of the education provided.       Assessment   Gerda is a 85 y.o. female referred to outpatient Physical Therapy with a medical diagnosis of B shoulder and Knee pain. Pt presents with Limited ROM in B shoulders and knees.  She c/o difficulty gettingher R UE over head to curl the back of her hair.    Pt prognosis is Good.   Pt will benefit from skilled outpatient Physical Therapy to address the deficits stated above and in the chart below, provide pt/family education, and to maximize pt's level of independence.     Plan of care discussed with patient: Yes  Pt's spiritual, cultural and educational needs considered and pt agreeable to plan of care and goals as stated below:     Anticipated Barriers for therapy: none    Medical Necessity is demonstrated by the following  History  Co-morbidities and personal factors that may impact the plan of care Examination  Body Structures and Functions, activity limitations and participation restrictions that may impact the plan of care    Clinical Presentation   Co-morbidities:   advanced age, history of cancer, HTN and Fibromyalgia        Personal Factors:   age Body Regions:   lower extremities  upper extremities    Body Systems:    ROM  strength  balance  blood pressure            Participation Restrictions:   Reaching over head     Activity limitations:   Learning and applying knowledge  no deficits    General Tasks and Commands  no deficits    Communication  no deficits    Mobility  lifting and carrying objects  walking    Self care  dressing  looking after one's health    Domestic Life  shopping  doing house work (cleaning house, washing dishes, laundry)    Interactions/Relationships  no deficits    Life Areas  no deficits    Community and Social Life  recreation and leisure         stable  "and uncomplicated                      low   low  low Decision Making/ Complexity Score:  low       Short Term GOALS: 6 weeks. Pt agrees with goals set.  Pt independent in a HEP to address functional deficits  Decrease pain at worse to </=6/10 with ADL's  Improve B LE MMT    Long Term GOALS: 12  weeks. Pt agrees with goals set.  Pt to report decreased pain "at worse" to 4/10 with ADL's  Improve MMT in B LE's to allow Pt to ambulate without an assistive device  Improve functional ROM in R knee and R shoulder to improve functional abilities.  Pt to indicat improved function through an improved score on the FOTO Shoulder survey      Plan     Certification Period: 9/7/2018 to 11/7/2018.    Outpatient Physical Therapy 2 times weekly for 12 weeks to include the following interventions: Gait Training, Manual Therapy, Moist Heat/ Ice, Neuromuscular Re-ed, Patient Education, Therapeutic Activites and Therapeutic Exercise.     Ebenezer Sanchez, PT      "

## 2018-09-11 ENCOUNTER — CLINICAL SUPPORT (OUTPATIENT)
Dept: REHABILITATION | Facility: HOSPITAL | Age: 83
End: 2018-09-11
Payer: MEDICARE

## 2018-09-11 DIAGNOSIS — M25.611 DECREASED ROM OF RIGHT SHOULDER: ICD-10-CM

## 2018-09-11 DIAGNOSIS — M25.511 RIGHT SHOULDER PAIN, UNSPECIFIED CHRONICITY: Primary | ICD-10-CM

## 2018-09-11 DIAGNOSIS — M25.562 PAIN IN BOTH KNEES, UNSPECIFIED CHRONICITY: ICD-10-CM

## 2018-09-11 DIAGNOSIS — M25.561 PAIN IN BOTH KNEES, UNSPECIFIED CHRONICITY: ICD-10-CM

## 2018-09-11 PROCEDURE — 97110 THERAPEUTIC EXERCISES: CPT | Mod: PO

## 2018-09-11 NOTE — PROGRESS NOTES
"  Physical Therapy Daily Treatment Note     Name: Gerda Lai  Clinic Number: [id number]    Therapy Diagnosis:   Encounter Diagnoses   Name Primary?    Right shoulder pain, unspecified chronicity Yes    Pain in both knees, unspecified chronicity     Decreased ROM of right shoulder      Physician: Clayton Robison MD    Visit Date: 9/11/2018    Medical Diagnosis: R shoulder and B knee pain  Evaluation Date: 9/7/2018  Authorization period Expiration: 12/31/2018  Plan of Care Certification Period: 11/7/2018   Visit #: 2/ Visits authorized: 20  Time In: 4:05   Time Out: 5:00  Treatment time:50  Total Billable Time: 25 minutes     Precautions: Standard, HTN    Subjective     Pt reports: chronic pain to (B) knees and shoulders (R>L). States that she takes a "bath in Biofreeze".    Pain: 3/10 to shoulders and knees ( R) shoulder>L).        Objective     Gerda received therapeutic exercises to develop strength, endurance, ROM, flexibility and posture for 40 minutes including:     AAROM on pulleys for shoulder flex and abduction x 2 minutes each.  Scap retract/row with YTB 2 x 10  Bruggers ex ( scap retract with ER) no resistance 2 x 10   Scap retract/protract with T-ball supported on plinth 2 x 10  SHoulder H-abd/Add with T-ball supported on plinth 2 x 10  AROM shoulder flex with cane in supine 2 x 10  AAROM Shoulder ER/IR with min manual resist 2 x 10    QS x 20  SAQ 2 x 10  Hooklying hip abd/ER with OTB 2 x 10  Hooklying hip add isometric 2 x 10    Gerda received the following manual therapy techniques: Joint mobilizations were applied to the: (R) shoulder for 10 minutes, including:  GH oscillations  Inferior/posterior GH glides grade ll  STM (R) pec insertion      Written Home Exercises Provided:  None new added. Patient reports having copies of HEP from previous therapy visits over the years. She was educated on the importance of compliance with HEP which she voices understanding. "It's just a matter of me doing " "my exercises."   Exercises were reviewed and Gerda was able to demonstrate them prior to the end of the session.  Gerda demonstrated good  understanding of the education provided.        Assessment     Patient ulisses TX fairly well. Able to perform the above ex's/activities within tolerable level of muscular fatigue and discomfort. (R) shoulder remains more problematic than (L) with know rotator cuff pathology (B). Decreased (R) shoulder ROM and decreased strength for ER noted compared to (L) which affects her ADL abilities somewhat. Generalized LE weakness evident resulting in some gait unsteadiness. Will monitor and attempt to progress as tolerated.       Pt will continue to benefit from skilled outpatient physical therapy to address the deficits listed in the problem list box on initial evaluation, provide pt/family education and to maximize pt's level of independence in the home and community environment.       Plan     Continue PT toward established plan of care and goals    Milton Santana, PTA   "

## 2018-09-19 ENCOUNTER — CLINICAL SUPPORT (OUTPATIENT)
Dept: REHABILITATION | Facility: HOSPITAL | Age: 83
End: 2018-09-19
Payer: MEDICARE

## 2018-09-19 DIAGNOSIS — M25.561 PAIN IN BOTH KNEES, UNSPECIFIED CHRONICITY: ICD-10-CM

## 2018-09-19 DIAGNOSIS — M25.562 PAIN IN BOTH KNEES, UNSPECIFIED CHRONICITY: ICD-10-CM

## 2018-09-19 PROCEDURE — 97110 THERAPEUTIC EXERCISES: CPT | Mod: PO

## 2018-09-19 NOTE — PROGRESS NOTES
"  Physical Therapy Daily Treatment Note     Name: Gerda Lai  Clinic Number: [id number]    Therapy Diagnosis:   Encounter Diagnosis   Name Primary?    Pain in both knees, unspecified chronicity      Physician: Clayton Robison MD    Visit Date: 9/19/2018    Medical Diagnosis: R shoulder and B knee pain  Evaluation Date: 9/7/2018  Authorization period Expiration: 12/31/2018  Plan of Care Certification Period: 11/7/2018   Visit #: 3/ Visits authorized: 20  Time In: 1:30   Time Out: 2:30  Treatment time:60  Total Billable Time: 30 minutes     Precautions: Standard, HTN    Subjective     Pt reports: chronic pain to (B) knees and shoulders (R>L). States that she takes a "bath in Biofreeze".    Pain: 3/10 to shoulders and knees ( R) shoulder>L).        Objective     Gerda received therapeutic exercises to develop strength, endurance, ROM, flexibility and posture for 40 minutes including:     AAROM on pulleys for shoulder flex and abduction x 2 minutes each.  Scap retract/row with YTB 2 x 10  Bruggers ex ( scap retract with ER) no resistance 2 x 10   Scap retract/protract with T-ball supported on plinth 2 x 10  SHoulder H-abd/Add with T-ball supported on plinth 2 x 10  AROM shoulder flex with cane in supine 2 x 10  AAROM Shoulder ER/IR with min manual resist 2 x 10    QS x 20  SAQ 2 x 10  Hooklying hip abd/ER with OTB 2 x 10  Hooklying hip add isometric 2 x 10    Gerda received the following manual therapy techniques: Joint mobilizations were applied to the: (R) shoulder for 10 minutes, including:  GH oscillations  Inferior/posterior GH glides grade ll  STM (R) pec insertion      Written Home Exercises Provided:  None new added. Patient reports having copies of HEP from previous therapy visits over the years. She was educated on the importance of compliance with HEP which she voices understanding. "It's just a matter of me doing my exercises."   Exercises were reviewed and Gerda was able to demonstrate them prior to " the end of the session.  Gerda demonstrated good  understanding of the education provided.        Assessment     Patient ulisses TX fairly well. She continues with B LE weakness.       Pt will continue to benefit from skilled outpatient physical therapy to address the deficits listed in the problem list box on initial evaluation, provide pt/family education and to maximize pt's level of independence in the home and community environment.       Plan     Continue PT toward established plan of care and goals    Ebenezer Sanchez, PT

## 2018-09-21 ENCOUNTER — CLINICAL SUPPORT (OUTPATIENT)
Dept: REHABILITATION | Facility: HOSPITAL | Age: 83
End: 2018-09-21
Payer: MEDICARE

## 2018-09-21 DIAGNOSIS — M25.511 RIGHT SHOULDER PAIN, UNSPECIFIED CHRONICITY: Primary | ICD-10-CM

## 2018-09-21 DIAGNOSIS — M25.561 PAIN IN BOTH KNEES, UNSPECIFIED CHRONICITY: ICD-10-CM

## 2018-09-21 DIAGNOSIS — M25.562 PAIN IN BOTH KNEES, UNSPECIFIED CHRONICITY: ICD-10-CM

## 2018-09-21 PROCEDURE — 97110 THERAPEUTIC EXERCISES: CPT | Mod: PO

## 2018-09-21 NOTE — PROGRESS NOTES
"  Physical Therapy Daily Treatment Note     Name: Gerda Lai  Clinic Number: [id number]    Therapy Diagnosis:   No diagnosis found.  Physician: Clayton Robison MD    Visit Date: 9/21/2018    Medical Diagnosis: R shoulder and B knee pain  Evaluation Date: 9/7/2018  Authorization period Expiration: 12/31/2018  Plan of Care Certification Period: 11/7/2018   Visit #: 2/ Visits authorized: 20  Time In: 4:05   Time Out: 5:00  Treatment time:50  Total Billable Time: 25 minutes     Precautions: Standard, HTN    Subjective     Pt reports: chronic pain to (B) knees and shoulders (R>L). States that she takes a "bath in Biofreeze".    Pain: 3/10 to shoulders and knees ( R) shoulder>L).        Objective     Gerda received therapeutic exercises to develop strength, endurance, ROM, flexibility and posture for 55 minutes including:     AAROM on pulleys for shoulder flex and abduction x 2 minutes each. - np  Scap retract/row with YTB 2 x 10  Bruggers ex ( scap retract with ER) no resistance 2 x 10 - np  Scap retract/protract with T-ball supported on plinth 3 x 20  SHoulder H-abd/Add with T-ball supported on plinth 3 x 20  AROM shoulder flex with cane in supine 3 x 10  AAROM Shoulder ER/IR with min manual resist 2 x 10 - np  Standing rows 10 x 3 with orange TB  Standing B shoulder extension 10 x 3 with orange TB    QS x 20  SAQ 2 x 10  Hooklying hip abd/ER with OTB 3 x 10  Hooklying hip add isometric 3 x 10    Gerda received the following manual therapy techniques: Joint mobilizations were applied to the: (R) shoulder for 0 minutes, including:  GH oscillations  Inferior/posterior GH glides grade ll  STM (R) pec insertion      Written Home Exercises Provided:  None new added. Patient reports having copies of HEP from previous therapy visits over the years. She was educated on the importance of compliance with HEP which she voices understanding. "It's just a matter of me doing my exercises."   Exercises were reviewed and Gerda " was able to demonstrate them prior to the end of the session.  Gerda demonstrated good  understanding of the education provided.     BP post Tx 171/81 HR 58     Assessment     Patient ulisses TX fairly well.  She reported feeling dizzy following standing shoulder exercises stating that she felt that her BP may be up. (R) shoulder remains more problematic than (L) with know rotator cuff pathology (B). Decreased (R) shoulder ROM and decreased strength for ER noted compared to (L) which affects her ADL abilities somewhat. Generalized LE weakness evident resulting in some gait unsteadiness. Will monitor and attempt to progress as tolerated.       Pt will continue to benefit from skilled outpatient physical therapy to address the deficits listed in the problem list box on initial evaluation, provide pt/family education and to maximize pt's level of independence in the home and community environment.       Plan     Continue PT toward established plan of care and goals    Ebenezer Sanchez, PT

## 2018-09-26 ENCOUNTER — CLINICAL SUPPORT (OUTPATIENT)
Dept: REHABILITATION | Facility: HOSPITAL | Age: 83
End: 2018-09-26
Payer: MEDICARE

## 2018-09-26 DIAGNOSIS — M25.561 PAIN IN BOTH KNEES, UNSPECIFIED CHRONICITY: ICD-10-CM

## 2018-09-26 DIAGNOSIS — M25.562 PAIN IN BOTH KNEES, UNSPECIFIED CHRONICITY: ICD-10-CM

## 2018-09-26 DIAGNOSIS — M25.511 RIGHT SHOULDER PAIN, UNSPECIFIED CHRONICITY: Primary | ICD-10-CM

## 2018-09-26 PROCEDURE — 97140 MANUAL THERAPY 1/> REGIONS: CPT | Mod: PO

## 2018-09-26 PROCEDURE — 97110 THERAPEUTIC EXERCISES: CPT | Mod: PO

## 2018-09-26 NOTE — PROGRESS NOTES
"  Physical Therapy Daily Treatment Note     Name: Gerda Lai  Clinic Number: [id number]    Therapy Diagnosis:   Encounter Diagnoses   Name Primary?    Pain in both knees, unspecified chronicity     Right shoulder pain, unspecified chronicity Yes     Physician: Clayton Robison MD    Visit Date: 9/26/2018    Medical Diagnosis: R shoulder and B knee pain  Evaluation Date: 9/7/2018  Authorization period Expiration: 12/31/2018  Plan of Care Certification Period: 11/7/2018   Visit #: 5/ Visits authorized: 20  Time In: 1:30   Time Out: 2:30  Treatment time:60  Total Billable Time: 30 minutes     Precautions: Standard, HTN    Subjective     Pt reports: chronic pain to (B) knees and shoulders (R>L). States that she takes a "bath in Biofreeze".    Pain: 3/10 to shoulders and knees ( R) shoulder>L).        Objective     Gerda received therapeutic exercises to develop strength, endurance, ROM, flexibility and posture for 55 minutes including:     AAROM on pulleys for shoulder flex and abduction x 2 minutes each. - np  Scap retract/row with YTB 2 x 10  Bruggers ex ( scap retract with ER) no resistance 2 x 10 - np  Scap retract/protract with T-ball supported on plinth 3 x 20  SHoulder H-abd/Add with T-ball supported on plinth 3 x 20  AROM shoulder flex with cane in supine 3 x 10  AAROM Shoulder ER/IR with min manual resist 2 x 10 - np  Standing rows 10 x 3 with orange TB  Standing B shoulder extension 10 x 3 with orange TB    QS x 20  SAQ 2 x 10  Hooklying hip abd/ER with OTB 3 x 10  Hooklying hip add isometric 3 x 10    Gerda received the following manual therapy techniques: Joint mobilizations were applied to the: (R) shoulder for 0 minutes, including:  GH oscillations  Inferior/posterior GH glides grade ll  STM (R) pec insertion      Written Home Exercises Provided:  None new added. Patient reports having copies of HEP from previous therapy visits over the years. She was educated on the importance of compliance with " "HEP which she voices understanding. "It's just a matter of me doing my exercises."   Exercises were reviewed and Gerda was able to demonstrate them prior to the end of the session.  Gerda demonstrated good  understanding of the education provided.     BP post Tx 171/81 HR 58     Assessment     Patient ulisses TX fairly well.  Pt continues with B LE weakness and limited and painful R shoulder ROM       Pt will continue to benefit from skilled outpatient physical therapy to address the deficits listed in the problem list box on initial evaluation, provide pt/family education and to maximize pt's level of independence in the home and community environment.       Plan     Continue PT toward established plan of care and goals    Ebenezer Sanchez, PT   "

## 2018-09-28 ENCOUNTER — CLINICAL SUPPORT (OUTPATIENT)
Dept: REHABILITATION | Facility: HOSPITAL | Age: 83
End: 2018-09-28
Payer: MEDICARE

## 2018-09-28 DIAGNOSIS — M25.561 PAIN IN BOTH KNEES, UNSPECIFIED CHRONICITY: ICD-10-CM

## 2018-09-28 DIAGNOSIS — M25.562 PAIN IN BOTH KNEES, UNSPECIFIED CHRONICITY: ICD-10-CM

## 2018-09-28 PROCEDURE — 97110 THERAPEUTIC EXERCISES: CPT | Mod: PO

## 2018-09-28 PROCEDURE — 97140 MANUAL THERAPY 1/> REGIONS: CPT | Mod: PO

## 2018-09-28 NOTE — PROGRESS NOTES
"  Physical Therapy Daily Treatment Note     Name: Gerda Lai  Clinic Number: [id number]    Therapy Diagnosis:   Encounter Diagnosis   Name Primary?    Pain in both knees, unspecified chronicity      Physician: Clayton Robison MD    Visit Date: 9/28/2018    Medical Diagnosis: R shoulder and B knee pain  Evaluation Date: 9/7/2018  Authorization period Expiration: 12/31/2018  Plan of Care Certification Period: 11/7/2018   Visit #: 6/ Visits authorized: 20  Time In: 1:05   Time Out: 2:00  Treatment time: 55 minutes  Total Billable Time: 55 minutes     Precautions: Standard, HTN    Subjective     Pt reports: decrease in symptoms.  Pt reports she thinks her R knee is still swollen.  Pt reports she has been having tingling in the L LE.  Pt reports she has also been feeling like fluid is " seeping or sweating" through the L LE.  Pt reports these symptoms have been going on for about a month.  Pt reports these treatments seem to be really helping her.    Pain: 1/10 to shoulders and knees ( R) shoulder>L).        Objective     Observation: walking without the cane today ( pt feels this is an improvement)    Gerda received therapeutic exercises to develop strength, endurance, ROM, flexibility and posture for 45 minutes including:     QS x 20  SAQ 2 x 10  Hooklying hip abd/ER with OTB 3 x 10  Hooklying hip add isometric 3 x 10    BP post Tx 142/75 HR 60    AAROM on pulleys for shoulder flex and abduction x 2 minutes each  Bruggers ex in supine ( scap retract with ER) no resistance 2 x 10  Scap retract/protract with T-ball supported on plinth 3 x 20- np  Shoulder H-abd/Add with T-ball supported on plinth 3 x 20- NP  AROM shoulder flex with cane in supine 3 x 10  AAROM Shoulder ER/IR with min manual resist 2 x 10 - np  Standing rows 10 x 3 with orange TB  Standing B shoulder extension 10 x 3 with orange TB          Gerda received the following manual therapy techniques: Joint mobilizations were applied to the: (R) shoulder " for 10 minutes, including:  GH oscillations  Inferior/posterior GH glides grade ll  STM (R) pec insertion      Written Home Exercises Provided:  None new added.   Exercises were reviewed and Gerda was able to demonstrate them prior to the end of the session.  Gerda demonstrated good  understanding of the education provided.         Assessment     Patient ulisses TX fairly well. Pt reported the shoulder felt much better following STM and pec minor stretching.  Pt requires cueing to perform scapular exercises correctly without compensation.  Pt with some cueing needed to stay on task. Will monitor and attempt to progress as tolerated.       Pt will continue to benefit from skilled outpatient physical therapy to address the deficits listed in the problem list box on initial evaluation, provide pt/family education and to maximize pt's level of independence in the home and community environment.       Plan     Continue PT toward established plan of care and goals

## 2018-10-03 ENCOUNTER — CLINICAL SUPPORT (OUTPATIENT)
Dept: REHABILITATION | Facility: HOSPITAL | Age: 83
End: 2018-10-03
Payer: MEDICARE

## 2018-10-03 DIAGNOSIS — M25.562 PAIN IN BOTH KNEES, UNSPECIFIED CHRONICITY: ICD-10-CM

## 2018-10-03 DIAGNOSIS — M25.561 PAIN IN BOTH KNEES, UNSPECIFIED CHRONICITY: ICD-10-CM

## 2018-10-03 PROCEDURE — 97110 THERAPEUTIC EXERCISES: CPT | Mod: PO

## 2018-10-03 PROCEDURE — 97140 MANUAL THERAPY 1/> REGIONS: CPT | Mod: PO

## 2018-10-03 NOTE — PROGRESS NOTES
"  Physical Therapy Daily Treatment Note     Name: Gerda Lai  Clinic Number: [id number]    Therapy Diagnosis:   Encounter Diagnosis   Name Primary?    Pain in both knees, unspecified chronicity      Physician: Clayton Robison MD    Visit Date: 10/3/2018    Medical Diagnosis: R shoulder and B knee pain  Evaluation Date: 9/7/2018  Authorization period Expiration: 12/31/2018  Plan of Care Certification Period: 11/7/2018   Visit #: 7/ Visits authorized: 20  Time In:2:00   Time Out: 3:00  Treatment time: 50 minutes  Total Billable Time: 25 minutes     Precautions: Standard, HTN    Subjective     Pt reports:continued varying levels of pain to her (R) shoulder and  knees.  She reports increased knee pain and swelling today." Some days are better than others."  States that she continues to rely on biofreeze frequently.  Pain: 3/10 to shoulders and 6/10 to knees.  ( R) shoulder>L).      Objective       Gerda received therapeutic exercises to develop strength, endurance, ROM, flexibility and posture for 40 minutes including:     AAROM on pulleys for shoulder flex and abduction x 2 minutes each  Standing rows 10 x 3 with orange TB  Standing B shoulder extension 10 x 3 with orange TB  Scap retract/protract with T-ball supported on plinth 3 x 10-   Shoulder H-abd/Add with T-ball supported on plinth 3 x 10   AROM shoulder flex with cane in supine 3 x 10  AAROM Shoulder ER/IR with min manual resist 2 x 10 - np  Bruggers ex in supine ( scap retract with ER) no resistance 2 x 10      QS with towel roll under knees x 20  SAQ 2 x 10  Hooklying hip abd/ER with OTB 3 x 10  Hooklying hip add isometric 3 x 10  Heel slides 2 x 10      Gerda received the following manual therapy techniques: Joint mobilizations were applied to the: (R) shoulder for 10 minutes, including:  GH oscillations  Inferior/posterior GH glides grade ll  STM (R) pec insertion     Cold pack to (B) knees for pain and edema control x 10 minutes with elevation.    "   Written Home Exercises Provided:  Patient educated to continue with previously issued HEP to tolerance.  Exercises were reviewed and Gerda was able to demonstrate them prior to the end of the session.  Gerda demonstrated good  understanding of the education provided.     Assessment     Patient ulisses TX fairly well. She did present with increased (B) knee discomfort/swelling today but reports good response with decreased pain after the addition of cryotherapy to her knees post ex's. Also reports good relief of (R) shoulder symptoms with manual techniques.  Some chronic symptoms with varying pain levels still lingering.  Will monitor and attempt to progress as tolerated.       Pt will continue to benefit from skilled outpatient physical therapy to address the deficits listed in the problem list box on initial evaluation, provide pt/family education and to maximize pt's level of independence in the home and community environment.       Plan     Continue PT toward established plan of care and goals

## 2018-10-05 ENCOUNTER — CLINICAL SUPPORT (OUTPATIENT)
Dept: REHABILITATION | Facility: HOSPITAL | Age: 83
End: 2018-10-05
Payer: MEDICARE

## 2018-10-05 DIAGNOSIS — M25.511 RIGHT SHOULDER PAIN, UNSPECIFIED CHRONICITY: Primary | ICD-10-CM

## 2018-10-05 DIAGNOSIS — M25.561 PAIN IN BOTH KNEES, UNSPECIFIED CHRONICITY: ICD-10-CM

## 2018-10-05 DIAGNOSIS — M25.562 PAIN IN BOTH KNEES, UNSPECIFIED CHRONICITY: ICD-10-CM

## 2018-10-05 DIAGNOSIS — M25.611 DECREASED ROM OF RIGHT SHOULDER: ICD-10-CM

## 2018-10-05 PROCEDURE — 97140 MANUAL THERAPY 1/> REGIONS: CPT | Mod: PO

## 2018-10-05 PROCEDURE — 97110 THERAPEUTIC EXERCISES: CPT | Mod: PO

## 2018-10-05 NOTE — PROGRESS NOTES
Physical Therapy Daily Treatment Note     Name: Gerda Lai  Clinic Number: [id number]    Therapy Diagnosis:   Encounter Diagnoses   Name Primary?    Pain in both knees, unspecified chronicity     Right shoulder pain, unspecified chronicity Yes    Decreased ROM of right shoulder      Physician: Clayton Robison MD    Visit Date: 10/5/2018    Medical Diagnosis: R shoulder and B knee pain  Evaluation Date: 9/7/2018  Authorization period Expiration: 12/31/2018  Plan of Care Certification Period: 11/7/2018   Visit #: 8/ Visits authorized: 20  Time In:1:40  Time Out: 2:40  Treatment time: 55 minutes  Total Billable Time: 55 minutes     Precautions: Standard, HTN    Subjective     Pt reports:continued varying levels of pain to her (R) shoulder and knees.  She reports that she did not use her biofreeze today so she is sore. She also rpeorts some aggravation of (R) shoulder discomfort yesterday after picking up a crate of heavy paperwork. States that the addition of cryotherapy on her knees was helpful..   Pain: 3-4/10 to shoulders and 3/10 to knees.  ( R) shoulder>L).      Objective       Gerda received therapeutic exercises to develop strength, endurance, ROM, flexibility and posture for 45 minutes including:     AAROM on pulleys for shoulder flex and abduction x 2 minutes each  Standing rows 10 x 3 with orange TB  Standing B shoulder extension 10 x 3 with orange TB  Scap retract/protract with T-ball supported on plinth 3 x 10-   Shoulder H-abd/Add with T-ball supported on plinth 3 x 10   AROM shoulder flex with cane in supine 3 x 10  AAROM Shoulder ER/IR with min manual resist 2 x 10 - np  Bruggers ex in supine ( scap retract with ER) no resistance 2 x 10     Recumbent stationary bike x 5 minutes level 1 seat# 9  QS with towel roll under knees x 20  SAQ with 1# 2 x 10  Hooklying hip abd/ER with GTB 3 x 10  Hooklying hip add isometric 3 x 10  Heel slides 2 x 10--NP  Supine ham curl with legs supported on T-ball 2  "x 10      Gerda received the following manual therapy techniques: Joint mobilizations were applied to the: (R) shoulder for 10 minutes, including:  GH oscillations  Inferior/posterior GH glides grade ll  STM (R) pec insertion     Cold pack to (B) knees for pain and edema control x 10 minutes while receiving manual techniques to (R) shoulder.     Written Home Exercises Provided:  Patient educated to continue with previously issued HEP to tolerance.  Exercises were reviewed and Gerda was able to demonstrate them prior to the end of the session.  Gerda demonstrated good  understanding of the education provided.     Assessment     Patient ulisses TX fairly well.  She was progressed to perform the recumbent stationary bike today and was able to perform without increased symptoms only fatigue and states that it ws helpful to "loosen" her up.   . Also reports good relief of (R) shoulder symptoms with manual techniques.  Some chronic symptoms with varying pain levels still lingering.  Discomfort level = slight/tolerable. Will monitor and attempt to progress as tolerated.       Pt will continue to benefit from skilled outpatient physical therapy to address the deficits listed in the problem list box on initial evaluation, provide pt/family education and to maximize pt's level of independence in the home and community environment.       Plan     Continue PT toward established plan of care and goals  "

## 2018-10-10 ENCOUNTER — CLINICAL SUPPORT (OUTPATIENT)
Dept: REHABILITATION | Facility: HOSPITAL | Age: 83
End: 2018-10-10
Payer: MEDICARE

## 2018-10-10 DIAGNOSIS — M25.561 PAIN IN BOTH KNEES, UNSPECIFIED CHRONICITY: ICD-10-CM

## 2018-10-10 DIAGNOSIS — M25.511 RIGHT SHOULDER PAIN, UNSPECIFIED CHRONICITY: Primary | ICD-10-CM

## 2018-10-10 DIAGNOSIS — M25.562 PAIN IN BOTH KNEES, UNSPECIFIED CHRONICITY: ICD-10-CM

## 2018-10-10 DIAGNOSIS — M25.611 DECREASED ROM OF RIGHT SHOULDER: ICD-10-CM

## 2018-10-10 PROCEDURE — 97110 THERAPEUTIC EXERCISES: CPT | Mod: PO

## 2018-10-10 NOTE — PROGRESS NOTES
Physical Therapy Daily Treatment Note     Name: Gerda Lai  Clinic Number: [id number]    Therapy Diagnosis:   Encounter Diagnoses   Name Primary?    Pain in both knees, unspecified chronicity     Right shoulder pain, unspecified chronicity Yes    Decreased ROM of right shoulder      Physician: Clayton Robison MD    Visit Date: 10/10/2018    Medical Diagnosis: R shoulder and B knee pain  Evaluation Date: 9/7/2018  Authorization period Expiration: 12/31/2018  Plan of Care Certification Period: 11/7/2018   Visit #: 9/ Visits authorized: 20  Time In:3:05  Time Out: 4:00  Treatment time: 55 minutes  Total Billable Time: 25 minutes     Precautions: Standard, HTN    Subjective     Pt reports:continued varying levels of pain to her (R) shoulder and knees but feels a little better overall. .   She does express some concerns re: her balance. States that Dr.Ochsner thinks she may be a good candidate for a knee replacement.  Pain: 3 /10 to shoulders and 3/10 to knees.  ( R) shoulder>L).      Objective       Gerda received therapeutic exercises to develop strength, endurance, ROM, flexibility and posture for 45 minutes including:     AAROM on pulleys for shoulder flex and abduction x 2 minutes each  Standing rows 12 x 3 with orange TB  Standing B shoulder extension 12 x 3 with orange TB  Scap retract/protract with T-ball supported on plinth 3 x 12-   Shoulder H-abd/Add with T-ball supported on plinth 3 x12  AROM shoulder flex with  Dowel in supine 3 x 10  AAROM Shoulder ER/IR with min manual resist 2 x 10 - np  Breuggers ex in supine ( scap retract with ER) no resistance 2 x 10     Recumbent stationary bike x 5 minutes level 1 seat# 9  QS with towel roll under knees x 20  SAQ with 1# 3 x 10  Hooklying hip abd/ER with GTB 3 x 10  Hooklying hip add isometric 3 x 10  Heel slides 2 x 10--NP  Supine ham curl with legs supported on T-ball 3 x 10  Lateral band walk with YTB around knees 2 x 10 feet with light UE support on  "counter top      Gerda received the following manual therapy techniques: Joint mobilizations were applied to the: (R) shoulder for 10 minutes, including:  GH oscillations  Inferior/posterior GH glides grade ll  STM (R) pec insertion     Cold pack to (B) knees for pain and edema control x 10 minutes while receiving manual techniques to (R) shoulder.     Written Home Exercises Provided:  Patient educated to continue with previously issued HEP to tolerance.  Exercises were reviewed and Gerda was able to demonstrate them prior to the end of the session.  Gerda demonstrated good  understanding of the education provided.     Assessment     Patient ulisses TX fairly well.  States that she is somewhat challenged with the stationary bike initially but feels that it does a good job of " loosening" her up.  Able to perform the above ex's within tolerable level of muscular fatigue without increased pain.  Added some lateral band walking to help with hip abductor strengthening and balance. She reports good relief of (R) shoulder symptoms with manual techniques.  Some chronic symptoms with varying pain levels still lingering.  Discomfort level = slight/tolerable. Will monitor and attempt to progress as tolerated.       Pt will continue to benefit from skilled outpatient physical therapy to address the deficits listed in the problem list box on initial evaluation, provide pt/family education and to maximize pt's level of independence in the home and community environment.       Plan     Continue PT toward established plan of care and goals  "

## 2018-10-12 ENCOUNTER — CLINICAL SUPPORT (OUTPATIENT)
Dept: REHABILITATION | Facility: HOSPITAL | Age: 83
End: 2018-10-12
Payer: MEDICARE

## 2018-10-12 DIAGNOSIS — M25.561 PAIN IN BOTH KNEES, UNSPECIFIED CHRONICITY: ICD-10-CM

## 2018-10-12 DIAGNOSIS — M25.562 PAIN IN BOTH KNEES, UNSPECIFIED CHRONICITY: ICD-10-CM

## 2018-10-12 PROCEDURE — G8985 CARRY GOAL STATUS: HCPCS | Mod: CJ,PO

## 2018-10-12 PROCEDURE — 97110 THERAPEUTIC EXERCISES: CPT | Mod: PO

## 2018-10-12 PROCEDURE — G8984 CARRY CURRENT STATUS: HCPCS | Mod: CK,PO

## 2018-10-12 NOTE — PROGRESS NOTES
Physical Therapy Daily Treatment Note     Name: Gerda Lai  Clinic Number: [id number]    Therapy Diagnosis:   Encounter Diagnosis   Name Primary?    Pain in both knees, unspecified chronicity      Physician: Clayton Robison MD    Visit Date: 10/12/2018    Medical Diagnosis: R shoulder and B knee pain  Evaluation Date: 9/7/2018  Authorization period Expiration: 12/31/2018  Plan of Care Certification Period: 11/7/2018   Visit #: 10/ Visits authorized: 20  Time In:3:30  Time Out: 4:30  Treatment time: 55 minutes  Total Billable Time: 55 minutes     Precautions: Standard, HTN    Subjective     Pt reports: that she would like to exercise her legs first because   Pain: 3 /10 to shoulders and 6/10 to knees.  ( R) shoulder>L).      Objective        CMS Impairment/Limitation/Restriction for FOTO Shoulder Survey     Therapist reviewed FOTO scores for Gerda Lai on 9/7/2018.   FOTO documents entered into [employer] - see Media section.     Limitation Score: 60%  Category: Carrying     Current : CK = at least 40% but < 60% impaired, limited or restricted  Goal: CJ = at least 20% but < 40% impaired, limited or restricted  Discharge:           Gerda received therapeutic exercises to develop strength, endurance, ROM, flexibility and posture for 45 minutes including:     AAROM on pulleys for shoulder flex and abduction x 2 minutes each - np  Standing rows 12 x 3 with green TB  Standing B shoulder extension 12 x 3 with green TB  Scap retract/protract with T-ball supported on plinth 20 x 2   Shoulder H-abd/Add with T-ball supported on plinth 20 x 2  AROM shoulder flex with  Dowel in supine 3 x 10 - np  AAROM Shoulder ER/IR with min manual resist 2 x 10 - np  Breuggers ex in supine ( scap retract with ER) no resistance 2 x 10 - np    Stationary bike x 7 minutes level 1 seat # 7  QS with towel roll under knees x 20 - np  SAQ with 2# 3 x 10  Hooklying hip abd/ER with GTB 3 x 10  Hooklying hip add isometric 3 x 10  Heel slides 2 x  10--NP  Supine ham curl with legs supported on T-ball 3 x 10 - np  Lateral band walk with YTB around knees 2 x 10 feet with light UE support on counter top  SLR into flexion 10 x 2 on L 7 R  Seated manually resisted HS curls 10 x 2 on R & L  Gerda received the following manual therapy techniques: Joint mobilizations were applied to the: (R) shoulder for 10 minutes, including:  GH oscillations  Inferior/posterior GH glides grade ll  STM (R) pec insertion     Cold pack to (B) knees for pain and edema control x 10 minutes while receiving manual techniques to (R) shoulder.     Written Home Exercises Provided:  Patient educated to continue with previously issued HEP to tolerance.  Exercises were reviewed and Gerda was able to demonstrate them prior to the end of the session.  Gerda demonstrated good  understanding of the education provided.     Assessment     Patient ulisses TX fairly well.  She was able to perform the above ex's within tolerable level of muscular fatigue without increased pain.  Added SLR into flexion and side stepping with CGA She reports good relief of (R) shoulder symptoms with manual techniques.  Some chronic symptoms with varying pain levels still lingering.  Discomfort level = slight/tolerable. Will monitor and attempt to progress as tolerated.       Pt will continue to benefit from skilled outpatient physical therapy to address the deficits listed in the problem list box on initial evaluation, provide pt/family education and to maximize pt's level of independence in the home and community environment.       Plan     Continue PT toward established plan of care and goals

## 2018-10-15 ENCOUNTER — OFFICE VISIT (OUTPATIENT)
Dept: FAMILY MEDICINE | Facility: CLINIC | Age: 83
End: 2018-10-15
Payer: MEDICARE

## 2018-10-15 VITALS
HEART RATE: 58 BPM | WEIGHT: 143.94 LBS | TEMPERATURE: 98 F | HEIGHT: 63 IN | BODY MASS INDEX: 25.5 KG/M2 | DIASTOLIC BLOOD PRESSURE: 60 MMHG | SYSTOLIC BLOOD PRESSURE: 140 MMHG

## 2018-10-15 DIAGNOSIS — I10 HYPERTENSION, ESSENTIAL: ICD-10-CM

## 2018-10-15 DIAGNOSIS — Z23 NEED FOR PROPHYLACTIC VACCINATION AND INOCULATION AGAINST INFLUENZA: Primary | ICD-10-CM

## 2018-10-15 PROCEDURE — 90662 IIV NO PRSV INCREASED AG IM: CPT | Mod: PBBFAC,PO

## 2018-10-15 PROCEDURE — 99999 PR PBB SHADOW E&M-EST. PATIENT-LVL III: CPT | Mod: PBBFAC,,, | Performed by: FAMILY MEDICINE

## 2018-10-15 PROCEDURE — 99213 OFFICE O/P EST LOW 20 MIN: CPT | Mod: PBBFAC,PO | Performed by: FAMILY MEDICINE

## 2018-10-15 PROCEDURE — 99214 OFFICE O/P EST MOD 30 MIN: CPT | Mod: S$PBB,,, | Performed by: FAMILY MEDICINE

## 2018-10-15 NOTE — PROGRESS NOTES
Subjective:       Patient ID: Gerda Lai is a 85 y.o. female.    Chief Complaint: Hypertension (follow up); Knee Pain; and Leg Swelling    Disclaimer: This note has been generated using voice-recognition software. There may be typographical errors that have been missed during proof-reading    84 yo presents today for follow up of hypertension and for evaluation of swelling involving lower extremities.  Pt has prior history of recurrent swelling of knee joint, mostly left, and she is presently in physical therapy, with improved ambulation  However, she is concerned that this could be secondary to CHF.  She denies chest pains, PND or orthopnea.  Last BNP about 6 months ago 34  Pt has not joined the [facility] program yet      Review of Systems   Constitutional: Negative for activity change, fatigue and unexpected weight change.   Respiratory: Negative for chest tightness and shortness of breath.    Cardiovascular: Negative for chest pain, palpitations and leg swelling.   Musculoskeletal: Positive for arthralgias.       Objective:      Physical Exam   Constitutional: She appears well-developed and well-nourished. No distress.   Neck: No JVD present. No thyromegaly present.   Cardiovascular: Normal rate and regular rhythm.   No murmur heard.  Pulmonary/Chest: Effort normal and breath sounds normal. She has no wheezes. She has no rales.   Abdominal: Soft. Bowel sounds are normal. There is no tenderness.   Musculoskeletal: She exhibits no edema.   No evidence of pedal edema   Lymphadenopathy:     She has no cervical adenopathy.       Assessment:       1. Hypertension, essential        Plan:       1.  Continue present medications  2.  Flu vaccine  3.  Instructed on how to enroll in [facility] program

## 2018-10-17 ENCOUNTER — CLINICAL SUPPORT (OUTPATIENT)
Dept: REHABILITATION | Facility: HOSPITAL | Age: 83
End: 2018-10-17
Payer: MEDICARE

## 2018-10-17 DIAGNOSIS — M25.562 PAIN IN BOTH KNEES, UNSPECIFIED CHRONICITY: ICD-10-CM

## 2018-10-17 DIAGNOSIS — M25.561 PAIN IN BOTH KNEES, UNSPECIFIED CHRONICITY: ICD-10-CM

## 2018-10-17 PROCEDURE — 97110 THERAPEUTIC EXERCISES: CPT | Mod: KX,PO

## 2018-10-17 NOTE — PROGRESS NOTES
Physical Therapy Daily Treatment Note     Name: Gerda Lai  Clinic Number: [id number]    Therapy Diagnosis:   Encounter Diagnosis   Name Primary?    Pain in both knees, unspecified chronicity      Physician: Clayton Robison MD    Visit Date: 10/17/2018    Medical Diagnosis: R shoulder and B knee pain  Evaluation Date: 9/7/2018  Authorization period Expiration: 12/31/2018  Plan of Care Certification Period: 11/7/2018   Visit #: 11/ Visits authorized: 20  Time In:2:30  Time Out: 3:30  Treatment time: 55 minutes  Total Billable Time: 30 minutes     Precautions: Standard, HTN    Subjective     Pt reports: that her R knee is swollen today  Pain: 3 /10 to shoulders and 6/10 to knees.  ( R) shoulder>L).      Objective       Gerda received therapeutic exercises to develop strength, endurance, ROM, flexibility and posture for 45 minutes including:     AAROM on pulleys for shoulder flex and abduction x 2 minutes each - np  Standing rows 12 x 3 with green TB  Standing B shoulder extension 12 x 3 with green TB  Scap retract/protract with T-ball supported on plinth 20 x 2   Shoulder H-abd/Add with T-ball supported on plinth 20 x 2  AROM shoulder flex with  Dowel in supine 3 x 10 - np  AAROM Shoulder ER/IR with min manual resist 2 x 10 - np  Breuggers ex in supine ( scap retract with ER) no resistance 2 x 10 - np    Stationary bike x 7 minutes level 1 seat # 7  SAQ with 3# 3 x 10  Bridges 10 x 3  Hooklying hip abd/ER with GTB 3 x 10  Hooklying hip add isometric 3 x 10  Supine ham curl with legs supported on T-ball 3 x 10 - np  Lateral band walk with YTB around knees 4 x 10 with CGA  SLR into flexion 10 x 2 on L & R  Seated manually resisted HS curls 10 x 2 on R & L - np  Gerda received the following manual therapy techniques: Joint mobilizations were applied to the: (R) shoulder for 0 minutes, including:  GH oscillations - np  Inferior/posterior GH glides grade ll - np  STM (R) pec insertion - np    Cold pack to (B)  knees for pain and edema control x 10 minutes while receiving manual techniques to (R) shoulder. - np     Written Home Exercises Provided:  Patient educated to continue with previously issued HEP to tolerance.  Exercises were reviewed and Gerda was able to demonstrate them prior to the end of the session.  Gerda demonstrated good  understanding of the education provided.     Assessment     Patient ulisses TX fairly well.  She was able to tolerated Bridges today and continue with SLR in place of quad sets.  Weight with SAQ increased to 3#.  Some chronic symptoms with varying pain levels still lingering.  Will monitor and attempt to progress as tolerated.       Pt will continue to benefit from skilled outpatient physical therapy to address the deficits listed in the problem list box on initial evaluation, provide pt/family education and to maximize pt's level of independence in the home and community environment.       Plan     Continue PT toward established plan of care and goals

## 2018-10-18 DIAGNOSIS — I10 ESSENTIAL HYPERTENSION: ICD-10-CM

## 2018-10-18 RX ORDER — LABETALOL 100 MG/1
TABLET, FILM COATED ORAL
Qty: 135 TABLET | Refills: 1 | Status: SHIPPED | OUTPATIENT
Start: 2018-10-18 | End: 2019-01-24 | Stop reason: SDUPTHER

## 2018-10-23 ENCOUNTER — CLINICAL SUPPORT (OUTPATIENT)
Dept: REHABILITATION | Facility: HOSPITAL | Age: 83
End: 2018-10-23
Payer: MEDICARE

## 2018-10-23 DIAGNOSIS — M25.561 PAIN IN BOTH KNEES, UNSPECIFIED CHRONICITY: ICD-10-CM

## 2018-10-23 DIAGNOSIS — M25.562 PAIN IN BOTH KNEES, UNSPECIFIED CHRONICITY: ICD-10-CM

## 2018-10-23 DIAGNOSIS — M25.611 DECREASED ROM OF RIGHT SHOULDER: ICD-10-CM

## 2018-10-23 DIAGNOSIS — M25.511 RIGHT SHOULDER PAIN, UNSPECIFIED CHRONICITY: Primary | ICD-10-CM

## 2018-10-23 DIAGNOSIS — R29.898 LEFT LEG WEAKNESS: ICD-10-CM

## 2018-10-23 PROCEDURE — 97110 THERAPEUTIC EXERCISES: CPT | Mod: PO

## 2018-10-23 NOTE — PROGRESS NOTES
Physical Therapy Daily Treatment Note     Name: Gerda Lai  Clinic Number: [id number]    Therapy Diagnosis:   Encounter Diagnoses   Name Primary?    Pain in both knees, unspecified chronicity     Right shoulder pain, unspecified chronicity Yes    Decreased ROM of right shoulder     Left leg weakness      Physician: Clayton Robison MD    Visit Date: 10/23/2018    Medical Diagnosis: R shoulder and B knee pain  Evaluation Date: 9/7/2018  Authorization period Expiration: 12/31/2018  Plan of Care Certification Period: 11/7/2018   Visit #:12/ Visits authorized: 20  Time In:11:10  Time Out:12:00  Treatment time: 50 minutes  Total Billable Time: 25 minutes     Precautions: Standard, HTN    Subjective     Pt reports: some continued (B) knee and shoulder pain.  Increased activity with her family over the weekend resulting in increased soreness.   Pain: 3 /10 to shoulders and 5/10 to knees.  ( R) shoulder>L).      Objective       Gerda received therapeutic exercises to develop strength, endurance, ROM, flexibility and posture for 50 minutes including:     AAROM on pulleys for shoulder flex and abduction x 2 minutes each -   Standing rows 12 x 3 with green TB  Standing B shoulder extension 12 x 3 with green TB  Scap retract/protract with T-ball supported on plinth 20 x 2   Shoulder H-abd/Add with T-ball supported on plinth 20 x 2  AROM shoulder flex with  Dowel in supine 2 x 10 -   AAROM Shoulder ER/IR with min manual resist 2 x 10 - np  Breuggers ex in supine ( scap retract with ER) no resistance 2 x 10 - np    Stationary bike x 7 minutes level 1 seat # 7--NP   SAQ with 3# 3 x 10  Bridges 10 x 3  Hooklying hip abd/ER with GTB 3 x 10  Hooklying hip add isometric 3 x 10  Supine ham curl with legs supported on T-ball 3 x 10    Lateral band walk with OTB around knees 4 x 10 with CGA  SLR into flexion 10 x 2 on L & R  Seated manually resisted HS curls 10 x 2 on R & L - np    Gerda received the following manual therapy  techniques: Joint mobilizations were applied to the: (R) shoulder for 0 minutes, including:--NP  GH oscillations - np  Inferior/posterior GH glides grade ll - np  STM (R) pec insertion - np    Cold pack to (B) knees for pain and edema control x 10 minutes while receiving manual techniques to (R) shoulder. - np     Written Home Exercises Provided:  Patient educated to continue with previously issued HEP to tolerance.  Exercises were reviewed and Gerda was able to demonstrate them prior to the end of the session.  Gerda demonstrated good  understanding of the education provided.     Assessment     Patient ulisses Tx fairly well. Despite initial c/o increased fatigue, she was able to perform the above ex's/activities within tolerable level of muscular fatigue and discomfort and reported feeling better post session..  She was challenged with lateral band walk activity. Some chronic symptoms persist with varying pain levels still lingering.  Will monitor and attempt to progress as tolerated.       Pt will continue to benefit from skilled outpatient physical therapy to address the deficits listed in the problem list box on initial evaluation, provide pt/family education and to maximize pt's level of independence in the home and community environment.       Plan     Continue PT toward established plan of care and goals.

## 2018-11-01 ENCOUNTER — CLINICAL SUPPORT (OUTPATIENT)
Dept: REHABILITATION | Facility: HOSPITAL | Age: 83
End: 2018-11-01
Payer: MEDICARE

## 2018-11-01 DIAGNOSIS — M25.562 PAIN IN BOTH KNEES, UNSPECIFIED CHRONICITY: ICD-10-CM

## 2018-11-01 DIAGNOSIS — M25.561 PAIN IN BOTH KNEES, UNSPECIFIED CHRONICITY: ICD-10-CM

## 2018-11-01 PROCEDURE — 97110 THERAPEUTIC EXERCISES: CPT | Mod: PO

## 2018-11-01 NOTE — PROGRESS NOTES
Physical Therapy Daily Treatment Note     Name: Gerda Lai  Clinic Number: [id number]    Therapy Diagnosis:   Encounter Diagnosis   Name Primary?    Pain in both knees, unspecified chronicity      Physician: Clayton Robison MD    Visit Date: 11/1/2018    Medical Diagnosis: R shoulder and B knee pain  Evaluation Date: 9/7/2018  Authorization period Expiration: 12/31/2018  Plan of Care Certification Period: 11/7/2018   Visit #:12/ Visits authorized: 20  Time In:5:00  Time Out: 5:50  Treatment time: 50 minutes  Total Billable Time: 30 minutes     Precautions: Standard, HTN    Subjective     Pt reports: that she has an appointment with the orthopedic clinic to have her R knee looked at next week.   Pain: 3 /10 to shoulders and 5/10 to knees.  ( R shoulder > L).      Objective     R knee with moderate edema today.  Gerda received therapeutic exercises to develop strength, endurance, ROM, flexibility and posture for 50 minutes including:     AAROM on pulleys for shoulder flex and abduction x 2 minutes each -   Standing rows 12 x 3 with green TB - np  Standing B shoulder extension 12 x 3 with green TB - np  Scap retract/protract with T-ball supported on plinth 20 x 2 - np  Shoulder H-abd/Add with T-ball supported on plinth 20 x 2 - np  AROM shoulder flex with  Dowel in supine 3 x 10    AAROM Shoulder ER/IR with min manual resist 2 x 10   Breuggers ex in supine ( scap retract with ER) no resistance 2 x 10 - np    Stationary bike x 8 minutes level 1 seat # 7   SAQ with 0# 3 x 10  Bridges 10 x 3  Hooklying hip abd/ER with GTB 2 x 20  Hooklying hip add isometric 2 x 20  Supine ham curl with legs supported on T-ball 3 x 10 - np  Lateral band walk with OTB around knees 4 x 10 with CGA  SLR into flexion 10 x 3 on L, 2 x 10 on R  Seated manually resisted HS curls 10 x 2 on R & L - np    Gerda did not receive the following manual therapy techniques: Joint mobilizations were applied to the: (R) shoulder for 0 minutes,  including:--NP  GH oscillations - np  Inferior/posterior GH glides grade ll - np  STM (R) pec insertion - np    Cold pack to (B) knees for pain and edema control x 10 minutes while receiving manual techniques to (R) shoulder. - np     Written Home Exercises Provided:  Patient educated to continue with previously issued HEP to tolerance.  Exercises were reviewed and Gerda was able to demonstrate them prior to the end of the session.  Gerda demonstrated good  understanding of the education provided.     Assessment     Patient ulisses Tx fairly well. She reported difficulty with SLR on the R secondary to fatigue following her second set of 10.  Pt with significant edema in her R knee today.  Will monitor and attempt to progress as tolerated.  Pt to consult Orthopedics on her R knee next week.    Pt will continue to benefit from skilled outpatient physical therapy to address the deficits listed in the problem list box on initial evaluation, provide pt/family education and to maximize pt's level of independence in the home and community environment.       Plan     Continue PT toward established plan of care and goals.

## 2018-11-06 ENCOUNTER — OFFICE VISIT (OUTPATIENT)
Dept: CARDIOLOGY | Facility: CLINIC | Age: 83
End: 2018-11-06
Payer: MEDICARE

## 2018-11-06 ENCOUNTER — LAB VISIT (OUTPATIENT)
Dept: LAB | Facility: HOSPITAL | Age: 83
End: 2018-11-06
Attending: NURSE PRACTITIONER
Payer: MEDICARE

## 2018-11-06 ENCOUNTER — OFFICE VISIT (OUTPATIENT)
Dept: ORTHOPEDICS | Facility: CLINIC | Age: 83
End: 2018-11-06
Payer: MEDICARE

## 2018-11-06 VITALS
HEART RATE: 64 BPM | DIASTOLIC BLOOD PRESSURE: 70 MMHG | SYSTOLIC BLOOD PRESSURE: 162 MMHG | BODY MASS INDEX: 25.75 KG/M2 | HEIGHT: 63 IN | WEIGHT: 145.31 LBS

## 2018-11-06 VITALS — BODY MASS INDEX: 25.73 KG/M2 | HEIGHT: 63 IN | WEIGHT: 145.19 LBS

## 2018-11-06 DIAGNOSIS — M19.011 PRIMARY OSTEOARTHRITIS OF RIGHT SHOULDER: ICD-10-CM

## 2018-11-06 DIAGNOSIS — R05.9 COUGH: ICD-10-CM

## 2018-11-06 DIAGNOSIS — M79.89 LEG SWELLING: ICD-10-CM

## 2018-11-06 DIAGNOSIS — E78.00 PURE HYPERCHOLESTEROLEMIA: ICD-10-CM

## 2018-11-06 DIAGNOSIS — M17.11 PRIMARY OSTEOARTHRITIS OF RIGHT KNEE: Primary | ICD-10-CM

## 2018-11-06 DIAGNOSIS — R06.02 SOB (SHORTNESS OF BREATH): ICD-10-CM

## 2018-11-06 DIAGNOSIS — I10 HYPERTENSION, ESSENTIAL: ICD-10-CM

## 2018-11-06 DIAGNOSIS — R53.83 FATIGUE, UNSPECIFIED TYPE: ICD-10-CM

## 2018-11-06 DIAGNOSIS — R53.83 FATIGUE, UNSPECIFIED TYPE: Primary | ICD-10-CM

## 2018-11-06 LAB
BASOPHILS # BLD AUTO: 0.03 K/UL
BASOPHILS NFR BLD: 0.3 %
BNP SERPL-MCNC: 47 PG/ML
DIFFERENTIAL METHOD: ABNORMAL
EOSINOPHIL # BLD AUTO: 0.1 K/UL
EOSINOPHIL NFR BLD: 1.2 %
ERYTHROCYTE [DISTWIDTH] IN BLOOD BY AUTOMATED COUNT: 12 %
HCT VFR BLD AUTO: 37.6 %
HGB BLD-MCNC: 12.5 G/DL
IMM GRANULOCYTES # BLD AUTO: 0.02 K/UL
IMM GRANULOCYTES NFR BLD AUTO: 0.2 %
LYMPHOCYTES # BLD AUTO: 1.6 K/UL
LYMPHOCYTES NFR BLD: 17.5 %
MCH RBC QN AUTO: 33.3 PG
MCHC RBC AUTO-ENTMCNC: 33.2 G/DL
MCV RBC AUTO: 100 FL
MONOCYTES # BLD AUTO: 0.7 K/UL
MONOCYTES NFR BLD: 7.2 %
NEUTROPHILS # BLD AUTO: 6.7 K/UL
NEUTROPHILS NFR BLD: 73.6 %
NRBC BLD-RTO: 0 /100 WBC
PLATELET # BLD AUTO: 229 K/UL
PMV BLD AUTO: 9.4 FL
RBC # BLD AUTO: 3.75 M/UL
WBC # BLD AUTO: 9.15 K/UL

## 2018-11-06 PROCEDURE — 99999 PR PBB SHADOW E&M-EST. PATIENT-LVL III: CPT | Mod: PBBFAC,,, | Performed by: PHYSICIAN ASSISTANT

## 2018-11-06 PROCEDURE — 99213 OFFICE O/P EST LOW 20 MIN: CPT | Mod: PBBFAC | Performed by: PHYSICIAN ASSISTANT

## 2018-11-06 PROCEDURE — 99213 OFFICE O/P EST LOW 20 MIN: CPT | Mod: PBBFAC,25,27,PO | Performed by: NURSE PRACTITIONER

## 2018-11-06 PROCEDURE — 36415 COLL VENOUS BLD VENIPUNCTURE: CPT | Mod: PO

## 2018-11-06 PROCEDURE — 83880 ASSAY OF NATRIURETIC PEPTIDE: CPT

## 2018-11-06 PROCEDURE — 99999 PR PBB SHADOW E&M-EST. PATIENT-LVL III: CPT | Mod: PBBFAC,,, | Performed by: NURSE PRACTITIONER

## 2018-11-06 PROCEDURE — 85025 COMPLETE CBC W/AUTO DIFF WBC: CPT

## 2018-11-06 PROCEDURE — 20610 DRAIN/INJ JOINT/BURSA W/O US: CPT | Mod: PBBFAC | Performed by: PHYSICIAN ASSISTANT

## 2018-11-06 PROCEDURE — 99214 OFFICE O/P EST MOD 30 MIN: CPT | Mod: S$PBB,,, | Performed by: NURSE PRACTITIONER

## 2018-11-06 PROCEDURE — 99214 OFFICE O/P EST MOD 30 MIN: CPT | Mod: 25,S$PBB,, | Performed by: PHYSICIAN ASSISTANT

## 2018-11-06 PROCEDURE — 20610 DRAIN/INJ JOINT/BURSA W/O US: CPT | Mod: S$PBB,59,RT, | Performed by: PHYSICIAN ASSISTANT

## 2018-11-06 RX ORDER — METHYLPREDNISOLONE ACETATE 80 MG/ML
80 INJECTION, SUSPENSION INTRA-ARTICULAR; INTRALESIONAL; INTRAMUSCULAR; SOFT TISSUE
Status: COMPLETED | OUTPATIENT
Start: 2018-11-06 | End: 2018-11-06

## 2018-11-06 RX ADMIN — METHYLPREDNISOLONE ACETATE 80 MG: 80 INJECTION, SUSPENSION INTRALESIONAL; INTRAMUSCULAR; INTRASYNOVIAL; SOFT TISSUE at 12:11

## 2018-11-06 NOTE — PROGRESS NOTES
Ms. Lai is a patient of Dr. Camarena and was last seen in [facility] 8/7/2018.    Subjective:   Patient ID:  Gerda Lai is a 85 y.o. female who presents for follow-up of Hypertension    Problem List:  Hypertension  - intolerance to multiple antihypertensive meds   HLD  Breast cancer s/p lumpectomy in 1992    HPI:     Ms. Lai is in clinic today for routine follow up.  Reports fatigue, cough, leg swelling, and sputum production.  Her weight is up about 5 lbs since her appointment 3 months ago.  Patient denies chest pain with exertion or at rest, palpitations,  dizziness, syncope, orthopnea, PND, or claudication.  Reports no routine exercise.  She is treated with low dose ASA and low intensity statin.  Patient is taking pravastatin 40mg and LDL is 67.  Hypertension is treated with tekturna 300mg, spironolactone 50mg, and labetolol 150mg BID.  Reports following a low salt diet.     Review of Systems   Constitution: Negative for decreased appetite, diaphoresis, weakness, malaise/fatigue, weight gain and weight loss.   Eyes: Negative for visual disturbance.   Cardiovascular: Negative for chest pain, claudication, dyspnea on exertion, irregular heartbeat, leg swelling, near-syncope, orthopnea, palpitations, paroxysmal nocturnal dyspnea and syncope.        Denies chest pressure   Respiratory: Negative for cough, hemoptysis, shortness of breath, sleep disturbances due to breathing and snoring.    Endocrine: Negative for cold intolerance and heat intolerance.   Hematologic/Lymphatic: Negative for bleeding problem. Does not bruise/bleed easily.   Musculoskeletal: Negative for myalgias.   Gastrointestinal: Negative for bloating, abdominal pain, anorexia, change in bowel habit, constipation, diarrhea, nausea and vomiting.   Neurological: Negative for difficulty with concentration, disturbances in coordination, excessive daytime sleepiness, dizziness, headaches, light-headedness, loss of balance and numbness.    Psychiatric/Behavioral: The patient does not have insomnia.        Allergies and current medications updated and reviewed:  Review of patient's allergies indicates:   Allergen Reactions    Sulfa (sulfonamide antibiotics) Rash    Spironolactone      Throat tightening    Clarithromycin Other (See Comments)     Weak, extreme fatigue. dizziness    Flexeril [cyclobenzaprine] Other (See Comments)     Dizziness    Iodine and iodide containing products     Lisinopril Other (See Comments)     Cough and sensation of throat swelling/?angioedema    Losartan Rash    Metoprolol Swelling     Tightness in throat    Tramadol Other (See Comments)     Dizzy and weak    Verapamil (bulk) Palpitations    Voltaren [diclofenac sodium] Other (See Comments)     Drops blood pressure     Current Outpatient Medications   Medication Sig    acetaminophen (TYLENOL) 650 MG TbSR Take 650 mg by mouth as needed (pain).     ascorbic acid (VITAMIN C) 100 MG tablet Take 100 mg by mouth 2 (two) times daily.     B INFANTIS/B ANI/B JOSE/B BIFID (PROBIOTIC 4X ORAL) Take 1 tablet by mouth daily    coenzyme Q10 (CO Q-10) 100 mg capsule Take 100 mg by mouth once daily.    glucosamine-chondroitin 500-400 mg tablet Take 1 tablet by mouth once daily.     ipratropium (ATROVENT) 0.03 % nasal spray 2 sprays by Nasal route 2 (two) times daily as needed.    labetalol (NORMODYNE) 100 MG tablet TAKE 1 AND 1/2 TABLETS BY MOUTH TWICE A DAY    LORazepam (ATIVAN) 0.5 MG tablet TAKE 1/2 TABLET BY MOUTH TWICE A DAY    multivitamin capsule Take 1 capsule by mouth once daily.    omeprazole (PRILOSEC OTC) 20 MG tablet Take 20 mg by mouth once daily.      pravastatin (PRAVACHOL) 40 MG tablet TAKE 1 TABLET (40 MG TOTAL) BY MOUTH ONCE DAILY.    spironolactone (ALDACTONE) 50 MG tablet Take 1 tablet (50 mg total) by mouth once daily.    TEKTURNA 300 mg Tab Take 300 mg by mouth once daily.      No current facility-administered medications for this visit.   "      Objective:        BP (!) 162/70   Pulse 64   Ht 5' 3" (1.6 m)   Wt 65.9 kg (145 lb 4.5 oz)   BMI 25.74 kg/m²     Physical Exam   Constitutional: She is oriented to person, place, and time. Vital signs are normal. She appears well-developed and well-nourished. She is active. No distress.   HENT:   Head: Normocephalic and atraumatic.   Eyes: Conjunctivae and lids are normal. No scleral icterus.   Neck: Neck supple. Normal carotid pulses, no hepatojugular reflux and no JVD present. Carotid bruit is not present.   Cardiovascular: Normal rate, regular rhythm, S1 normal, S2 normal and intact distal pulses. PMI is not displaced. Exam reveals no gallop and no friction rub.   No murmur heard.  Pulses:       Carotid pulses are 2+ on the right side, and 2+ on the left side.       Radial pulses are 2+ on the right side, and 2+ on the left side.        Dorsalis pedis pulses are 2+ on the right side, and 2+ on the left side.        Posterior tibial pulses are 1+ on the right side, and 1+ on the left side.   Pulmonary/Chest: Effort normal and breath sounds normal. No respiratory distress. She has no decreased breath sounds. She has no wheezes. She has no rhonchi. She has no rales. She exhibits no tenderness.   Abdominal: Soft. Normal appearance and bowel sounds are normal. She exhibits no distension, no fluid wave, no abdominal bruit, no ascites and no pulsatile midline mass. There is no hepatosplenomegaly. There is no tenderness.   Musculoskeletal: She exhibits no edema.   Neurological: She is alert and oriented to person, place, and time. Gait normal.   Skin: Skin is warm, dry and intact. No rash noted. She is not diaphoretic. Nails show no clubbing.   Psychiatric: She has a normal mood and affect. Her speech is normal and behavior is normal. Judgment and thought content normal. Cognition and memory are normal.   Nursing note and vitals reviewed.      Chemistry        Component Value Date/Time     07/26/2018 1639 "    K 3.9 07/26/2018 1639     07/26/2018 1639    CO2 24 07/26/2018 1639    BUN 23 07/26/2018 1639    CREATININE 1.0 07/26/2018 1639     (H) 07/26/2018 1639        Component Value Date/Time    CALCIUM 9.7 07/26/2018 1639    ALKPHOS 60 07/26/2018 1639    AST 24 07/26/2018 1639    ALT 15 07/26/2018 1639    BILITOT 1.6 (H) 07/26/2018 1639    ESTGFRAFRICA 59.4 (A) 07/26/2018 1639    EGFRNONAA 51.5 (A) 07/26/2018 1639        Lab Results   Component Value Date    HGBA1C 5.5 05/27/2011       Recent Labs   Lab 03/05/18  1224  03/09/18  1510  03/16/18  1042  07/26/18  1639 11/06/18  1530   WHITE BLOOD CELL COUNT 16.90 H   < >  --    < > 10.61   < >  --  9.15   HEMOGLOBIN 10.6 L   < >  --    < > 10.7 L   < >  --  12.5   HEMATOCRIT 30.6 L   < >  --    < > 32.2 L   < >  --  37.6   MCV 98   < >  --    < > 102 H   < >  --  100 H   PLATELETS 197   < >  --    < > 371 H   < >  --  229   BNP 60  --  34  --   --   --   --  47   TSH  --   --   --   --   --   --  1.051  --    CHOLESTEROL  --   --   --   --  141  --   --   --    HDL  --   --   --   --  37 L  --   --   --    LDL CHOLESTEROL  --   --   --   --  66.8  --   --   --    TRIGLYCERIDES  --   --   --   --  186 H  --   --   --    HDL/CHOLESTEROL RATIO  --   --   --   --  26.2  --   --   --     < > = values in this interval not displayed.   No results found for: FLJECRTK08  No results found for: FOLATE      Recent Labs   Lab 03/30/16  0156   INR 1.1        Test(s) Reviewed  I have reviewed the following in detail:  [] Stress test   [] Angiography   [] Echocardiogram   [x] Labs   [] Other:         Assessment/Plan:   1. Fatigue, unspecified type  Euvolemic on exam. Check TSH, CBC, and BNP    - Brain natriuretic peptide; Future    2. Cough  Lungs clear. Euvolemic on exam. Low suspicion for HF.    - Brain natriuretic peptide; Future    3. Leg swelling  Trace non-pitting edema noted. Instructed to elevate legs when possible. Reassured that she does not have HF exam findings.  Low salt, 2gm, diet.    - Brain natriuretic peptide; Future    4. SOB (shortness of breath)  Likely deconditioning. Euvolemic on exam. Check for anemia. BNP to verify no occult HF.    - CBC auto differential; Future    5. Hypertension, essential  BP at goal <130/80. No changes.     6. Pure hypercholesterolemia  LDL at goal <100. No changes. TG not at goal <150. Discussed ways to lower TG levels.      Patient was discussed with but not examined by Dr. Camarena    Follow-up in about 6 months (around 5/6/2019). with Dr. Camarena

## 2018-11-06 NOTE — PATIENT INSTRUCTIONS
Increase cardiovascular exercise to 30 minutes of brisk walking a day for 4-5 days a week.  You can use a stationary bike or swim for 30 minutes a day instead of walking.  Whatever exercise you choose, make sure you are working hard enough to increase your heart rate.     Ways to lower triglycerides  Cut simple sugars out of your diet (white breads, candies, cookies, cakes, etc.)  Reduce or eliminate your intake of vegetable fats and highly processed trans fatty acids.   Exercise 30 minutes a day for 4-5 days a week.   Consider taking the Omega 3 supplement.  Eat more fiber.

## 2018-11-06 NOTE — PROGRESS NOTES
Subjective:      Patient ID: Gerda Lai is a 85 y.o. female.    Chief Complaint: No chief complaint on file.    HPI  85 year old female presents with chief complaint of intermittent right shoulder pain x years. She is RHD. She fell years ago. She has h/o OA. Pain is worse at night. Tylenol gives some relief. She had cortisone injection in the past with good relief. She is doing PT and says it is helping.   Patient reports intermittent right knee pain. She has h/o OA. She reports swelling and limited flexion. She had cortisone injection in the past with good relief. She received euflexxa injections in April.   Review of Systems   Constitution: Negative for chills, fever and night sweats.   Cardiovascular: Negative for chest pain.   Respiratory: Negative for cough and shortness of breath.    Hematologic/Lymphatic: Does not bruise/bleed easily.   Skin: Negative for color change.   Gastrointestinal: Negative for heartburn.   Genitourinary: Negative for dysuria.   Neurological: Negative for numbness and paresthesias.   Psychiatric/Behavioral: Negative for altered mental status.   Allergic/Immunologic: Negative for persistent infections.         Objective:                    Right Shoulder Exam     Range of Motion   Active abduction: normal   Forward Flexion: normal   External Rotation 0 degrees: abnormal   Internal rotation 0 degrees: normal     Tests & Signs   Damon test: positive  Impingement: positive  Speed's Test: positive    Other   Sensation: normal    Muscle Strength   Right Upper Extremity   Shoulder Abduction: 4/5   Supraspinatus: 3/5/5   Biceps: 3/5/5     Vascular Exam     Right Pulses      Radial:                    2+        Right Knee Exam:  ROM 0-125 degrees  Small effusion  No warmth or erythema  TTP medial and lateral joint line      X-ray shoulder: reviewed by myself. Severe DJD with high riding right humeral head suggestive of rotator cuff pathology.  No fracture seen.  X-ray knee: reviewed by  myself. DJD both knees.      Assessment:       Encounter Diagnoses   Name Primary?    Primary osteoarthritis of right knee Yes    Primary osteoarthritis of right shoulder           Plan:       Discussed treatment options with patient. She would like cortisone injections today for her knee and shoulder. Knee aspiration was attempted but no fluid was obtained. Ice and elevate knee. Continue tylenol as needed. RTC prn.     PROCEDURE:  I have explained the risks, benefits, and alternatives of the procedure in detail.  The patient voices understanding and all questions have been answered.  The patient agrees to proceed as planned. So after I performed a sterile prep of the skin in the normal fashion the right shoulder is injected from the anterior approach using a 22 gauge needle with a combination of 4cc 1% plain lidocaine and 80 mg of depo medrol. The patient is cautioned and immediate relief of pain is secondary to the local anesthetic and will be temporary.  After the anesthetic wears off there may be a increase in pain that may last for a few hours or a few days and they should use ice to help alleviate this flair up of pain.      PROCEDURE:  I have explained the risks, benefits, and alternatives of the procedure in detail.  The patient voices understanding and all questions have been answered.  The patient agrees to proceed as planned. So after I performed a sterile prep of the skin in the normal fashion and the right knee is aspirated from the suprior lateral approach utilizing an 18 gauge needle of 0cc joint fluid and then injected with a combination of 4cc 1% plain lidocaine and 80 mg of depo medrol.  The patient is cautioned and immediate relief of pain is secondary to the local anesthetic and will be temporary.  After the anesthetic wears off there may be a increase in pain that may last for a few hours or a few days and they should use ice to help alleviate this flair up of pain.

## 2018-11-09 ENCOUNTER — PATIENT MESSAGE (OUTPATIENT)
Dept: CARDIOLOGY | Facility: CLINIC | Age: 83
End: 2018-11-09

## 2018-11-16 ENCOUNTER — CLINICAL SUPPORT (OUTPATIENT)
Dept: REHABILITATION | Facility: HOSPITAL | Age: 83
End: 2018-11-16
Payer: MEDICARE

## 2018-11-16 DIAGNOSIS — M25.562 PAIN IN BOTH KNEES, UNSPECIFIED CHRONICITY: ICD-10-CM

## 2018-11-16 DIAGNOSIS — M25.561 PAIN IN BOTH KNEES, UNSPECIFIED CHRONICITY: ICD-10-CM

## 2018-11-16 PROCEDURE — 97110 THERAPEUTIC EXERCISES: CPT | Mod: PO

## 2018-11-16 NOTE — PROGRESS NOTES
Physical Therapy Daily Treatment Note     Name: Gerda Lai  Clinic Number: [id number]    Therapy Diagnosis:   Encounter Diagnosis   Name Primary?    Pain in both knees, unspecified chronicity      Physician: Clayton Robison MD    Visit Date: 11/16/2018    Medical Diagnosis: R shoulder and B knee pain  Evaluation Date: 9/7/2018  Authorization period Expiration: 12/31/2018  Plan of Care Certification Period: 11/7/2018   Visit #:12/ Visits authorized: 20  Time In:1:30  Time Out: 2:30  Treatment time: 50 minutes  Total Billable Time: 30 minutes     Precautions: Standard, HTN    Subjective     Pt reports: that she had an appointment with the orthopedics since her last PT appointment.  She stated that they attempted to drain her knee, but were unable to draw any fluid out.  She reported that she received a cortisone injection in her knee and shoulder  Pain: 3 /10 to shoulders and 5/10 to knees.  ( R shoulder > L).      Objective     R knee with moderate edema today.  Gerda received therapeutic exercises to develop strength, endurance, ROM, flexibility and posture for 50 minutes including:     AAROM on pulleys for shoulder flex and abduction x 2 minutes each -   Standing rows 12 x 3 with green TB - np  Standing B shoulder extension 12 x 3 with green TB - np  Scap retract/protract with T-ball supported on plinth 20 x 2 - np  Shoulder H-abd/Add with T-ball supported on plinth 20 x 2 - np  AROM shoulder flex with  Dowel in supine 3 x 10    AAROM Shoulder ER/IR with min manual resist 2 x 10   Breuggers ex in supine ( scap retract with ER) no resistance 2 x 10 - np    Stationary bike x 10 minutes level 1 seat # 7   SAQ with 0# 3 x 10  Bridges 10 x 3  Hooklying hip abd/ER with GTB 2 x 20  Hooklying hip add isometric 2 x 20  Supine ham curl with legs supported on T-ball 3 x 10 - np  Lateral band walk with OTB around knees 4 x 10 with CGA - np  SLR into flexion 10 x 3 on L, 2 x 10 on R  Seated manually resisted HS curls 10  x 2 on R & L - np    Gerda did not receive the following manual therapy techniques: Joint mobilizations were applied to the: (R) shoulder for 0 minutes, including:--NP  GH oscillations - np  Inferior/posterior GH glides grade ll - np  STM (R) pec insertion - np    Cold pack to (B) knees for pain and edema control x 10 minutes while receiving manual techniques to (R) shoulder. - np     Written Home Exercises Provided:  Patient educated to continue with previously issued HEP to tolerance.  Exercises were reviewed and Gerda was able to demonstrate them prior to the end of the session.  Gerda demonstrated good  understanding of the education provided.     Assessment     Patient ulisses Tx fairly well. She reported that they were not able to draw any fluid out of her knee, but that she received a cortisone injection in her knee and shoulder.  Pt appeared to have greater difficulty getting around today than usual.   Will monitor and attempt to progress as tolerated.      Pt will continue to benefit from skilled outpatient physical therapy to address the deficits listed in the problem list box on initial evaluation, provide pt/family education and to maximize pt's level of independence in the home and community environment.       Plan     Continue PT toward established plan of care and goals.

## 2018-11-19 ENCOUNTER — OFFICE VISIT (OUTPATIENT)
Dept: RHEUMATOLOGY | Facility: CLINIC | Age: 83
End: 2018-11-19
Payer: MEDICARE

## 2018-11-19 VITALS
HEIGHT: 64 IN | SYSTOLIC BLOOD PRESSURE: 122 MMHG | DIASTOLIC BLOOD PRESSURE: 65 MMHG | WEIGHT: 140 LBS | HEART RATE: 54 BPM | BODY MASS INDEX: 23.9 KG/M2

## 2018-11-19 DIAGNOSIS — M12.811 ROTATOR CUFF TEAR ARTHROPATHY, RIGHT: ICD-10-CM

## 2018-11-19 DIAGNOSIS — M75.101 ROTATOR CUFF TEAR ARTHROPATHY, RIGHT: ICD-10-CM

## 2018-11-19 DIAGNOSIS — M19.071 PRIMARY OSTEOARTHRITIS OF BOTH FEET: ICD-10-CM

## 2018-11-19 DIAGNOSIS — S46.811S TEAR OF RIGHT INFRASPINATUS TENDON, SEQUELA: ICD-10-CM

## 2018-11-19 DIAGNOSIS — M17.0 PRIMARY OSTEOARTHRITIS OF BOTH KNEES: ICD-10-CM

## 2018-11-19 DIAGNOSIS — R20.2 PARESTHESIA OF LEFT LEG: ICD-10-CM

## 2018-11-19 DIAGNOSIS — Z78.0 MENOPAUSE: Primary | ICD-10-CM

## 2018-11-19 DIAGNOSIS — M19.072 PRIMARY OSTEOARTHRITIS OF BOTH FEET: ICD-10-CM

## 2018-11-19 DIAGNOSIS — M75.102 ROTATOR CUFF TEAR ARTHROPATHY, LEFT: ICD-10-CM

## 2018-11-19 DIAGNOSIS — I10 HYPERTENSION, ESSENTIAL: ICD-10-CM

## 2018-11-19 DIAGNOSIS — M12.812 ROTATOR CUFF TEAR ARTHROPATHY, LEFT: ICD-10-CM

## 2018-11-19 PROCEDURE — 99214 OFFICE O/P EST MOD 30 MIN: CPT | Mod: PBBFAC | Performed by: INTERNAL MEDICINE

## 2018-11-19 PROCEDURE — 99213 OFFICE O/P EST LOW 20 MIN: CPT | Mod: S$PBB,,, | Performed by: INTERNAL MEDICINE

## 2018-11-19 PROCEDURE — 99999 PR PBB SHADOW E&M-EST. PATIENT-LVL IV: CPT | Mod: PBBFAC,,, | Performed by: INTERNAL MEDICINE

## 2018-11-19 ASSESSMENT — ROUTINE ASSESSMENT OF PATIENT INDEX DATA (RAPID3)
MDHAQ FUNCTION SCORE: .5
TOTAL RAPID3 SCORE: 4.06
AM STIFFNESS SCORE: 1, YES
PAIN SCORE: 4.5
PATIENT GLOBAL ASSESSMENT SCORE: 6
FATIGUE SCORE: 5.5
PSYCHOLOGICAL DISTRESS SCORE: 1.1
WHEN YOU AWAKENED IN THE MORNING OVER THE LAST WEEK, PLEASE INDICATE THE AMOUNT OF TIME IT TAKES UNTIL YOU ARE AS LIMBER AS YOU WILL BE FOR THE DAY: 15 MINS

## 2018-11-19 NOTE — PROGRESS NOTES
"Subjective:       Patient ID: Gerda Lai is a 85 y.o. female.    Chief Complaint: Osteoarthritis of multiple joints    Right Glenohumeral injection 5/8/2018; Bilateral knee injection 3/1/2018; Left knee and Left GTB 8/9/2018; Right knee and Right shoulder injection 11/6/2018.     She has been tolerating Physical Therapy well. Her right shoulder has been causing he the most pain the past few days. Her right knee has been worse than her left lately. Her left hip pain has improved. PT has been improving her balance. She is continuing to go to PT now for once a week. She continues her home exercises. She has been having tingling down her left lateral leg down to her ankle. Denies any weakness in her left leg. Arthritis Tylenol 2-3x per day is the only medication she takes for pain. Celebrex in the past provided benefit , but unable to take due to blood pressure control issues.       Review of Systems   Constitutional: Negative for appetite change, fever and unexpected weight change.   HENT: Negative for congestion, sinus pressure and sinus pain.    Respiratory: Negative for chest tightness and shortness of breath.    Cardiovascular: Negative for chest pain and palpitations.   Genitourinary: Negative for dysuria and frequency.   Musculoskeletal: Positive for arthralgias, back pain, joint swelling and myalgias.   Skin: Negative for color change and rash.   Neurological: Negative for weakness.   Psychiatric/Behavioral: Negative for behavioral problems.         Objective:   /65   Pulse (!) 54   Ht 5' 3.6" (1.615 m)   Wt 63.5 kg (140 lb)   BMI 24.33 kg/m²      Physical Exam   Constitutional: She is oriented to person, place, and time and well-developed, well-nourished, and in no distress. No distress.   HENT:   Head: Normocephalic and atraumatic.   Right Ear: External ear normal.   Left Ear: External ear normal.   Mouth/Throat: No oropharyngeal exudate.   Eyes: Conjunctivae and EOM are normal. Right eye " exhibits no discharge. Left eye exhibits no discharge. No scleral icterus.   Cardiovascular: Normal rate and regular rhythm.    Pulmonary/Chest: Effort normal and breath sounds normal. No respiratory distress. She has no wheezes.   Abdominal: Soft. Bowel sounds are normal. She exhibits no distension.       Right Side Rheumatological Exam     Examination finds the shoulder, elbow, wrist, knee, 1st PIP, 2nd PIP, 3rd PIP, 4th PIP, 4th MCP, 5th PIP and 5th MCP normal.    The patient has an enlarged 1st MCP, 2nd MCP, 3rd MCP, 2nd DIP, 3rd DIP and 5th DIP    Shoulder Exam   Tenderness Location: subacromion and posterior shoulder    Knee Exam   Tenderness Location: medial joint line    Range of Motion   Extension: abnormal   Flexion: abnormal   Patellofemoral Crepitus: positive  Effusion: positive    Muscle Strength (0-5 scale):  Deltoid:  5  Biceps: 5/5   Triceps:  5  : 5/5   Iliopsoas: 5  Quadriceps:  5   Distal Lower Extremity: 5    Left Side Rheumatological Exam     Examination finds the shoulder, elbow, wrist, knee, 1st PIP, 2nd PIP, 3rd PIP, 3rd MCP, 4th PIP, 4th MCP, 5th PIP and 5th MCP normal.    The patient has an enlarged 1st MCP, 2nd MCP, 2nd DIP, 4th DIP and 5th DIP.    Knee Exam     Patellofemoral Crepitus: positive    Muscle Strength (0-5 scale):  Deltoid:  5  Biceps: 5/5   Triceps:  5  :  5/5   Iliopsoas: 5  Quadriceps:  5   Distal Lower Extremity: 5      Neurological: She is alert and oriented to person, place, and time. She has normal reflexes. She displays normal reflexes. No cranial nerve deficit. She exhibits normal muscle tone. Gait normal. Coordination normal. GCS score is 15.   Reflex Scores:       Bicep reflexes are 2+ on the right side and 2+ on the left side.       Brachioradialis reflexes are 2+ on the right side and 2+ on the left side.       Patellar reflexes are 2+ on the right side and 2+ on the left side.       Achilles reflexes are 2+ on the right side and 2+ on the left  side.  Skin: Skin is warm and dry. No rash noted. She is not diaphoretic. No erythema. No pallor.     Psychiatric: Mood, memory, affect and judgment normal.   Musculoskeletal: She exhibits tenderness.          Assessment:     Rotator cuff tear arthropathy bilateral R>L. OA ACJ s/p right shoulder IAS 11/6/18 by Bianca Hu in Ortho  OA knees right lateral TF>left louis but tricompartmental s/p Euflexxa in April and Right knee IAS 11/6/18 by Bianca Hu in Ortho  Trochanteric bursitis, chronic, bilateral  Lumbar spondylosis  Cervical Spondylosis  S/p right ELZBIETA    Plan:   DXA  Continue PT right shoulder and knees  Scetaminophen 650 q 6 hrs prn  Glucosamine-chondroitin 1405-8005 mg daily  Turmeric-Boswellia 700-1000 mg  RTC 6 months/PRN

## 2018-11-19 NOTE — PATIENT INSTRUCTIONS
Turmeric (curcumin) and Boswellia 700-100 mg  Continue Glucosamine-chondroitin  Return to clinic in 6 months

## 2018-11-19 NOTE — PROGRESS NOTES
I have personally taken the history and examined the patient and agree with the resident,s note as stated above   Rotator cuff tear arthropathy bilateral right> left. 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

## 2018-11-23 ENCOUNTER — CLINICAL SUPPORT (OUTPATIENT)
Dept: REHABILITATION | Facility: HOSPITAL | Age: 83
End: 2018-11-23
Payer: MEDICARE

## 2018-11-23 DIAGNOSIS — M25.561 PAIN IN BOTH KNEES, UNSPECIFIED CHRONICITY: ICD-10-CM

## 2018-11-23 DIAGNOSIS — M25.562 PAIN IN BOTH KNEES, UNSPECIFIED CHRONICITY: ICD-10-CM

## 2018-11-23 PROCEDURE — 97110 THERAPEUTIC EXERCISES: CPT | Mod: PO

## 2018-11-23 RX ORDER — ALISKIREN HEMIFUMARATE 300 MG/1
TABLET, FILM COATED ORAL
Qty: 90 TABLET | Refills: 2 | Status: SHIPPED | OUTPATIENT
Start: 2018-11-23 | End: 2019-08-24 | Stop reason: SDUPTHER

## 2018-11-23 NOTE — PROGRESS NOTES
Physical Therapy Daily Treatment Note     Name: Gerda Lai  Clinic Number: [id number]    Therapy Diagnosis:   Encounter Diagnosis   Name Primary?    Pain in both knees, unspecified chronicity      Physician: Clayton Robison MD    Visit Date: 11/23/2018    Medical Diagnosis: R shoulder and B knee pain  Evaluation Date: 9/7/2018  Authorization period Expiration: 12/31/2018  Plan of Care Certification Period: 11/7/2018   Visit #:13/ Visits authorized: 20  Time In:3:30  Time Out: 4:30  Treatment time: 50 minutes  Total Billable Time: 30 minutes     Precautions: Standard, HTN    Subjective     Pt reports: that she is feeling a little unsteady on her feet following Tx.  She stated that she felt steady at home so she left her stick at home.  Pain: 3 /10 to shoulders and 5/10 to knees.  ( R shoulder > L).      Objective     R knee with moderate edema today.  Gerda received therapeutic exercises to develop strength, endurance, ROM, flexibility and posture for 50 minutes including:     AAROM on pulleys for shoulder flex and abduction x 2 minutes each -   Standing rows 12 x 3 with green TB   Standing B shoulder extension 12 x 3 with green TB  Scap retract/protract with T-ball supported on plinth 20 x 2  Shoulder H-abd/Add with T-ball supported on plinth 20 x 2  AROM shoulder flex with  Dowel in supine 3 x 10 - np  AAROM Shoulder ER/IR with min manual resist 2 x 10 - np  Breuggers ex in supine ( scap retract with ER) no resistance 2 x 10 - np    Stationary bike x 10 m`inutes level 1 seat # 7   SAQ with 2# 3 x 10  Bridges 10 x 3  Hooklying hip abd/ER with GTB 2 x 20  Hooklying hip add isometric 2 x 20  Supine ham curl with legs supported on T-ball 3 x 10 - np  Lateral band walk with OTB around knees 4 x 10 with CGA - np  SLR into flexion 10 x 3 on L, 2 x 10 on R  Seated manually resisted HS curls 10 x 2 on R & L - np    Gerda did not receive the following manual therapy techniques: Joint mobilizations were applied to  the: (R) shoulder for 0 minutes, including:--NP  GH oscillations - np  Inferior/posterior GH glides grade ll - np  STM (R) pec insertion - np    Cold pack to (B) knees for pain and edema control x 10 minutes while receiving manual techniques to (R) shoulder. - np     Written Home Exercises Provided:  Patient educated to continue with previously issued HEP to tolerance.  Exercises were reviewed and Gerda was able to demonstrate them prior to the end of the session.  Gerda demonstrated good  understanding of the education provided.     Assessment     Patient ulisses Tx fairly well. She reported feeling unsteady on her feet following Tx today   Will monitor and attempt to progress as tolerated.      Pt will continue to benefit from skilled outpatient physical therapy to address the deficits listed in the problem list box on initial evaluation, provide pt/family education and to maximize pt's level of independence in the home and community environment.       Plan     Continue PT toward established plan of care and goals.

## 2018-11-26 RX ORDER — LORAZEPAM 0.5 MG/1
TABLET ORAL
Qty: 60 TABLET | Refills: 1 | Status: SHIPPED | OUTPATIENT
Start: 2018-11-26 | End: 2019-04-30 | Stop reason: SDUPTHER

## 2018-11-30 ENCOUNTER — CLINICAL SUPPORT (OUTPATIENT)
Dept: REHABILITATION | Facility: HOSPITAL | Age: 83
End: 2018-11-30
Payer: MEDICARE

## 2018-11-30 DIAGNOSIS — M25.561 PAIN IN BOTH KNEES, UNSPECIFIED CHRONICITY: ICD-10-CM

## 2018-11-30 DIAGNOSIS — G89.29 CHRONIC PAIN OF LEFT KNEE: ICD-10-CM

## 2018-11-30 DIAGNOSIS — M75.81 RIGHT ROTATOR CUFF TENDINITIS: ICD-10-CM

## 2018-11-30 DIAGNOSIS — M25.611 DECREASED ROM OF RIGHT SHOULDER: ICD-10-CM

## 2018-11-30 DIAGNOSIS — M25.511 RIGHT SHOULDER PAIN, UNSPECIFIED CHRONICITY: Primary | ICD-10-CM

## 2018-11-30 DIAGNOSIS — M25.562 PAIN IN BOTH KNEES, UNSPECIFIED CHRONICITY: ICD-10-CM

## 2018-11-30 DIAGNOSIS — R29.898 LEFT LEG WEAKNESS: ICD-10-CM

## 2018-11-30 DIAGNOSIS — M25.562 CHRONIC PAIN OF LEFT KNEE: ICD-10-CM

## 2018-11-30 PROCEDURE — 97140 MANUAL THERAPY 1/> REGIONS: CPT | Mod: PO

## 2018-11-30 PROCEDURE — 97110 THERAPEUTIC EXERCISES: CPT | Mod: PO

## 2018-11-30 NOTE — PROGRESS NOTES
"  Physical Therapy Daily Treatment Note     Name: Gerda Lai  Clinic Number: [id number]    Therapy Diagnosis:   Encounter Diagnoses   Name Primary?    Pain in both knees, unspecified chronicity     Right shoulder pain, unspecified chronicity Yes    Decreased ROM of right shoulder     Left leg weakness     Chronic pain of left knee     Right rotator cuff tendinitis      Physician: Clayton Robison MD    Visit Date: 11/30/2018    Medical Diagnosis: R shoulder and B knee pain  Evaluation Date: 9/7/2018  Authorization period Expiration: 12/31/2018  Plan of Care Certification Period: 11/7/2018   Visit #:16/ Visits authorized: 20  Time In:3:10 ( Patient with late arrival)   Time Out:4:00  Treatment time: 50 minutes  Total Billable Time: 50 minutes     Precautions: Standard, HTN    Subjective     Pt reports: continued various c/o aches and pains " all over" that varies in intensity from minimal to severe. States that she asked Dr. Dunn if she could take Celebrex for pain however, he did not recommend it. Patient states that she has been taking a tumeric supplement which has been somewhat helpful. Primary c/o pain is to her shoulders (R>L) , upper back and neck. She reports only slight knee discomfort today and wants to concentrate on treatment for her upper body today.   Pain: 5 /10 to shoulders/neck and 210 to knees.  ( R shoulder > L).      Objective        Gerda received therapeutic exercises to develop strength, endurance, ROM, flexibility and posture for 35 minutes including:     AAROM on pulleys for shoulder flex and abduction x 2 minutes each -   Standing rows 12 x 3 with green TB   Standing B shoulder extension 12 x 3 with green TB  Scap retract/protract with T-ball supported on plinth 20 x 2  Shoulder H-abd/Add with T-ball supported on plinth 20 x 2  AROM shoulder flex with  Dowel in supine 3 x 10    AAROM Shoulder ER/IR with min manual resist 2 x 10 - np  Breuggers ex in supine ( scap retract with " ER) no resistance 3 x 10   scap protract 2 x 10      Gerda did not receive the following manual therapy techniques: Joint mobilizations were applied to the: (R) shoulder for 10 minutes, including:   GH oscillations -   Inferior/posterior GH glides grade ll -   STM (R) pec insertion , UT's      Ex's/activities not performed today  Stationary bike x 10 m`inutes level 1 seat # 7   SAQ with 2# 3 x 10  Bridges 10 x 3  Hooklying hip abd/ER with GTB 2 x 20  Hooklying hip add isometric 2 x 20  Supine ham curl with legs supported on T-ball 3 x 10 - np  Lateral band walk with OTB around knees 4 x 10 with CGA - np  SLR into flexion 10 x 3 on L, 2 x 10 on R  Seated manually resisted HS curls 10 x 2 on R & L - np    Cold pack to (B) knees for pain and edema control x 10 minutes while receiving manual techniques to (R) shoulder. - np     Written Home Exercises Provided:  Patient educated to continue with previously issued HEP to tolerance.  Exercises were reviewed and Gerda was able to demonstrate them prior to the end of the session.  Gerda demonstrated good  understanding of the education provided.     PT/PTA met face to face to discuss patient's treatment plan and progress towards established goals.  Treatment will be continued as described in initial report/eval and progress notes.    Assessment     Patient ulisses Tx fairly well. Patient continues with various c/o chronic aches/pains to shoulders and knees. States that she usually feels better after Tx's but has some difficulty with residual longer lasting relief. Tx focused on upper back/shoulders today. She was able to perform the above ex's/activities within tolerable muscular fatigue and without increased pain. Notes some relief with manual techniques and pain is rated as tolerable post session. Will monitor and attempt to progress as tolerated.      Pt will continue to benefit from skilled outpatient physical therapy to address the deficits listed in the problem list box  on initial evaluation, provide pt/family education and to maximize pt's level of independence in the home and community environment.       Plan     Continue PT toward established plan of care and goals.

## 2018-12-06 NOTE — PLAN OF CARE
[facility]  Physical Therapy Initial Evaluation    Name: Gerda Lai  Clinic Number: [id number]    Therapy Diagnosis:   Encounter Diagnoses   Name Primary?    Right shoulder pain, unspecified chronicity     Pain in both knees, unspecified chronicity      Physician: Clayton Robison MD    Physician Orders: PT Eval and Treat   Medical Diagnosis: R shoulder and B knee pain  Evaluation Date: 9/7/2018  Authorization period Expiration: 12/31/2018  Plan of Care Certification Period: 11/7/2018    Visit #: 1/ Visits authorized: 20  Time In:1:30  Time Out: 2:00  Total Billable Time: 60 minutes    Precautions: Standard, HTN  Subjective   Date of onset: Pt has a hx of B shoulder and B knee pain  History of current condition - Gerda reports: Pt reports that she fell several years ago injuring her knees and shoulders       Past Medical History:   Diagnosis Date    Arthritis     Basal cell carcinoma 09/2016    right post auricular neck     Breast cancer 1992    right    Cataract     Fibromyalgia     Hyperlipidemia     Hypertension     Personal history of colonic polyps     Pneumonia     SCC (squamous cell carcinoma) 2015    R chest    SCC (squamous cell carcinoma) 2016    left medial shoulder    SCC (squamous cell carcinoma) 2017    left knee    Squamous cell carcinoma 2015    right forearm    Thyroid disease     Vaginitis      Gerda Lai  has a past surgical history that includes thyriodectomy; Total hip arthroplasty; tonsillectomy; Adenoidectomy; Cataract extraction w/  intraocular lens implant (Bilateral); Yag  (Left); Breast lumpectomy (Right, 1992); Breast biopsy (Left); and COLONOSCOPY (N/A, 8/15/2013).    Gerda has a current medication list which includes the following prescription(s): acetaminophen, ascorbic acid (vitamin c), b infantis/b ani/b magali/b bifid, coenzyme q10, fish oil-omega-3 fatty acids, glucosamine-chondroitin, ipratropium, labetalol, lorazepam,  multivitamin, omeprazole, pravastatin, spironolactone, and tekturna.    Review of patient's allergies indicates:   Allergen Reactions    Sulfa (sulfonamide antibiotics) Rash    Spironolactone      Throat tightening    Clarithromycin Other (See Comments)     Weak, extreme fatigue. dizziness    Flexeril [cyclobenzaprine] Other (See Comments)     Dizziness    Iodine and iodide containing products     Lisinopril Other (See Comments)     Cough and sensation of throat swelling/?angioedema    Losartan Rash    Metoprolol Swelling     Tightness in throat    Tramadol Other (See Comments)     Dizzy and weak    Verapamil (bulk) Palpitations    Voltaren [diclofenac sodium] Other (See Comments)     Drops blood pressure        Imaging, MRI studies: X-rays    Prior Therapy: yes  Social History: Pt lives in a single story home with no staris lives alone  Occupation: retired  Prior Level of Function: Independent in ADL's  Current Level of Function: Pt reports difficulty fixing her hair secondary to limited R shoulder AROM.  Pt with difficulty ambulating secondary to B knee pain    Pain:  Current 2/10, worst 8/10, best 0/10   Location: knee  and shoulder  bilateral  Description: Aching  Aggravating Factors: Walking and reaching  Easing Factors: pain medication    Pts goals: decrease pain in R shoulder and B knees        Objective     Mental status :alert  Posture Alignment :slouched posture  Sensation: Light Touch: Intact B UE's             ROM:  UPPER EXTREMITY--AROM/PROM  (R) UE: Shoulder flexion 150, IR 45, ER 30  (L) UE:  Shoulder flexion 162, IR 70, ER 55           RANGE OF MOTION--LOWER EXTREMITIES   (R) LE: limited as follows: knee flexion 120, extension 5 deg, hip 90 hip precautions     (L) LE: limited as follows: Knee flexion 130, extension 7,       Upper Extremity Strength  (R) UE  (L) UE    Shoulder flexion: 3/5 Shoulder flexion: 3/5   Shoulder Abduction: 3-/5 Shoulder abduction: 3-/5   Elbow flexion: 4/5 Elbow  flexion: 4/5   Elbow extension: 3+/5 Elbow extension: 3+/5   Wrist flexion: 4/5 Wrist flexion: 4/5   Wrist extension: 4/5 Wrist extension: 4/5    4/5 : 4/5       Lower Extremity Strength  Right LE  Left LE    Knee extension: 3/5 Knee extension: 3/5   Knee flexion: 3-/5 Knee flexion: 3-/5   Hip flexion: 2/5 Hip flexion: 2/5   Hip extension:  nt Hip extension: ny   Hip abduction: 3/5 Hip abduction: 3/5   Hip adduction: 3-/5 Hip adduction: 2/5               Ankle dorsiflexion:   4+/5 Ankle dorsiflexion:   4+/5   Ankle plantarflexion: nt Ankle plantarflexion: nt     GAIT: Gerda ambulates 200 feet with SPC with independently.     GAIT DEVIATIONS: Gerda displays occasional unsteady gait          CMS Impairment/Limitation/Restriction for FOTO Shoulder Survey    Therapist reviewed FOTO scores for Gerda Lai on 9/7/2018.   FOTO documents entered into [employer] - see Media section.    Limitation Score: 60%  Category: Carrying    Current : CK = at least 40% but < 60% impaired, limited or restricted  Goal: CJ = at least 20% but < 40% impaired, limited or restricted  Discharge:          TREATMENT   Treatment Time In: 2;00  Treatment Time Out: 2:30  Total Treatment time separate from Evaluation time:30    Gerda received therapeutic exercises to develop strength and ROM for 15 minutes including:  Quad sets with towel roll B 10 x 2  Supine AA shoulder flexion with dowel x 10     Gerda received the following manual therapy techniques: Joint mobilizations and Soft tissue Mobilization were applied to the: R shoulder for 15  minutes.   Passive mobilization R GH joint  Soft tissue mobilization R pectorals    Education provided re: Add towel roll to quad sets and perform AA shoulder Flex with dowel x 10 with 10 sec stretch  - progress towards goals   - role of therapy in multi - disciplinary team, goals for therapy  No spiritual or educational barriers to learning provided    Written Home Exercises Provided: no.  Exercises  were reviewed and Gerda was able to demonstrate them prior to the end of the session.   Pt received a written copy of exercises to perform at home. Gerda demonstrated good  understanding of the education provided.       Assessment   Gerda is a 85 y.o. female referred to outpatient Physical Therapy with a medical diagnosis of B shoulder and Knee pain. Pt presents with Limited ROM in B shoulders and knees.  She c/o difficulty gettingher R UE over head to curl the back of her hair.    Pt prognosis is Good.   Pt will benefit from skilled outpatient Physical Therapy to address the deficits stated above and in the chart below, provide pt/family education, and to maximize pt's level of independence.     Plan of care discussed with patient: Yes  Pt's spiritual, cultural and educational needs considered and pt agreeable to plan of care and goals as stated below:     Anticipated Barriers for therapy: none    Medical Necessity is demonstrated by the following  History  Co-morbidities and personal factors that may impact the plan of care Examination  Body Structures and Functions, activity limitations and participation restrictions that may impact the plan of care    Clinical Presentation   Co-morbidities:   advanced age, history of cancer, HTN and Fibromyalgia        Personal Factors:   age Body Regions:   lower extremities  upper extremities    Body Systems:    ROM  strength  balance  blood pressure            Participation Restrictions:   Reaching over head     Activity limitations:   Learning and applying knowledge  no deficits    General Tasks and Commands  no deficits    Communication  no deficits    Mobility  lifting and carrying objects  walking    Self care  dressing  looking after one's health    Domestic Life  shopping  doing house work (cleaning house, washing dishes, laundry)    Interactions/Relationships  no deficits    Life Areas  no deficits    Community and Social Life  recreation and leisure         stable  "and uncomplicated                      low   low  low Decision Making/ Complexity Score:  low       Short Term GOALS: 6 weeks. Pt agrees with goals set.  Pt independent in a HEP to address functional deficits  Decrease pain at worse to </=6/10 with ADL's  Improve B LE MMT    Long Term GOALS: 12  weeks. Pt agrees with goals set.  Pt to report decreased pain "at worse" to 4/10 with ADL's  Improve MMT in B LE's to allow Pt to ambulate without an assistive device  Improve functional ROM in R knee and R shoulder to improve functional abilities.  Pt to indicat improved function through an improved score on the FOTO Shoulder survey      Plan     Certification Period: 9/7/2018 to 11/7/2018.    Outpatient Physical Therapy 2 times weekly for 12 weeks to include the following interventions: Gait Training, Manual Therapy, Moist Heat/ Ice, Neuromuscular Re-ed, Patient Education, Therapeutic Activites and Therapeutic Exercise.     Ebenezer Sanchez, PT      "

## 2018-12-07 ENCOUNTER — CLINICAL SUPPORT (OUTPATIENT)
Dept: REHABILITATION | Facility: HOSPITAL | Age: 83
End: 2018-12-07
Payer: MEDICARE

## 2018-12-07 DIAGNOSIS — M25.561 PAIN IN BOTH KNEES, UNSPECIFIED CHRONICITY: ICD-10-CM

## 2018-12-07 DIAGNOSIS — M25.562 PAIN IN BOTH KNEES, UNSPECIFIED CHRONICITY: ICD-10-CM

## 2018-12-07 PROCEDURE — 97530 THERAPEUTIC ACTIVITIES: CPT | Mod: KX,PO

## 2018-12-07 PROCEDURE — 97110 THERAPEUTIC EXERCISES: CPT | Mod: KX,PO

## 2018-12-10 ENCOUNTER — TELEPHONE (OUTPATIENT)
Dept: FAMILY MEDICINE | Facility: CLINIC | Age: 83
End: 2018-12-10

## 2018-12-10 DIAGNOSIS — I10 HYPERTENSION, ESSENTIAL: ICD-10-CM

## 2018-12-10 DIAGNOSIS — Z00.00 ANNUAL PHYSICAL EXAM: Primary | ICD-10-CM

## 2018-12-10 NOTE — TELEPHONE ENCOUNTER
----- Message from Marcelo Dill sent at 12/10/2018  2:04 PM CST -----  Contact: self   Doctor appointment and lab have been scheduled.  Please link lab orders to the lab appointment.  Date of doctor appointment:  01/07  Physical or EP:  Physical   Date of lab appointment:  01/03  Comments:

## 2018-12-14 ENCOUNTER — CLINICAL SUPPORT (OUTPATIENT)
Dept: REHABILITATION | Facility: HOSPITAL | Age: 83
End: 2018-12-14
Payer: MEDICARE

## 2018-12-14 DIAGNOSIS — M25.611 DECREASED ROM OF RIGHT SHOULDER: ICD-10-CM

## 2018-12-14 DIAGNOSIS — R29.898 LEFT LEG WEAKNESS: ICD-10-CM

## 2018-12-14 DIAGNOSIS — G89.29 CHRONIC PAIN OF BOTH SHOULDERS: ICD-10-CM

## 2018-12-14 DIAGNOSIS — M25.511 CHRONIC PAIN OF BOTH SHOULDERS: ICD-10-CM

## 2018-12-14 DIAGNOSIS — M25.562 CHRONIC PAIN OF LEFT KNEE: ICD-10-CM

## 2018-12-14 DIAGNOSIS — M25.561 PAIN IN BOTH KNEES, UNSPECIFIED CHRONICITY: Primary | ICD-10-CM

## 2018-12-14 DIAGNOSIS — M25.562 PAIN IN BOTH KNEES, UNSPECIFIED CHRONICITY: Primary | ICD-10-CM

## 2018-12-14 DIAGNOSIS — M25.512 CHRONIC PAIN OF BOTH SHOULDERS: ICD-10-CM

## 2018-12-14 DIAGNOSIS — M25.511 RIGHT SHOULDER PAIN, UNSPECIFIED CHRONICITY: ICD-10-CM

## 2018-12-14 DIAGNOSIS — M75.81 RIGHT ROTATOR CUFF TENDINITIS: ICD-10-CM

## 2018-12-14 DIAGNOSIS — G89.29 CHRONIC PAIN OF LEFT KNEE: ICD-10-CM

## 2018-12-14 PROCEDURE — 97110 THERAPEUTIC EXERCISES: CPT | Mod: PO

## 2018-12-14 PROCEDURE — 97140 MANUAL THERAPY 1/> REGIONS: CPT | Mod: PO

## 2018-12-14 NOTE — PROGRESS NOTES
"  Physical Therapy Daily Treatment Note     Name: Gerda Lai  Clinic Number: [id number]    Therapy Diagnosis:   Encounter Diagnoses   Name Primary?    Pain in both knees, unspecified chronicity Yes    Right shoulder pain, unspecified chronicity     Decreased ROM of right shoulder     Left leg weakness     Chronic pain of left knee     Right rotator cuff tendinitis     Chronic pain of both shoulders      Physician: Clayton Robison MD    Visit Date: 12/14/2018    Medical Diagnosis: R shoulder and B knee pain  Evaluation Date: 9/7/2018  Authorization period Expiration: 12/31/2018  Plan of Care Certification Period: 11/7/2018   Visit #:18/ Visits authorized: 20  Time In:3:10 ( Patient with late arrival)   Time Out:4:05  Treatment time: 50 minutes  Total Billable Time: 25 minutes     Precautions: Standard, HTN    Subjective     Pt reports: continued various c/o aches and pains " all over" that varies in intensity from minimal to severe. States that her (R) shoulder is her primary c/o today . She reports increased pain after a shoulder injection recently stating that she thinks the hit a nerve. She does report that her walking tolerance has improved.   Pain: 7/10 to shoulders/neck and 3/10 to knees.  ( R shoulder > L).      Objective        Gerda received therapeutic exercises to develop strength, endurance, ROM, flexibility and posture for 30 minutes including:     AAROM on pulleys for shoulder flex and abduction x 2 minutes for  Flexion and 1 minute for abd (stopped abd stretch due to increased pain)    Standing rows 10 x 3 with green TB   Standing B shoulder extension 10 x 3 with green TB  Scap retract/protract with T-ball supported on plinth 20 x 2  Shoulder H-abd/Add with T-ball supported on plinth 20 x 2  AROM shoulder flex with  Dowel in supine 3 x 10    AAROM Shoulder ER/IR with min manual resist 2 x 10 - np  Breuggers ex in supine ( scap retract with ER) no resistance 3 x 10 --NP  scap protract 2 x " 10      Gerda did not receive the following manual therapy techniques: Joint mobilizations were applied to the: (R) shoulder for 15 minutes, including:   GH oscillations -   Inferior/posterior GH glides grade ll -   STM (R) pec insertion , UT's      Ex's/activities not performed today  Stationary bike x 10 m`inutes level 1 seat # 7   SAQ with 2# 3 x 10  Bridges 10 x 3  Hooklying hip abd/ER with GTB 2 x 20  Hooklying hip add isometric 2 x 20  Supine ham curl with legs supported on T-ball 3 x 10 - np  Lateral band walk with OTB around knees 4 x 10 with CGA - np  SLR into flexion 10 x 3 on L, 2 x 10 on R  Seated manually resisted HS curls 10 x 2 on R & L - np    Cold pack applied to (R) shoulder x 10 minutes for pain control.      Written Home Exercises Provided:  Patient educated to continue with previously issued HEP to tolerance.  Exercises were reviewed and Gerda was able to demonstrate them prior to the end of the session.  Gerda demonstrated good  understanding of the education provided.     PT/PTA met face to face to discuss patient's treatment plan and progress towards established goals.  Treatment will be continued as described in initial report/eval and progress notes.    Assessment     Patient ulisses Tx fair. Patient continues with various c/o chronic aches/pains to shoulders and knees. States that she usually feels better after Tx's but has some difficulty with residual longer lasting relief. Tx remained focused on her (R) shoulder today which was most bothersome.  Increased discomfort report with abduction stretching on pulleys and this was limited to 1 minute.  Notes fair  relief with manual techniques and cryotherapy however, pain level remained in the moderate range. .Will monitor and attempt to progress as tolerated.      Pt will continue to benefit from skilled outpatient physical therapy to address the deficits listed in the problem list box on initial evaluation, provide pt/family education and to  maximize pt's level of independence in the home and community environment.       Plan     Continue PT toward established plan of care and goals.

## 2018-12-20 DIAGNOSIS — M25.511 RIGHT SHOULDER PAIN, UNSPECIFIED CHRONICITY: Primary | ICD-10-CM

## 2018-12-20 NOTE — PROGRESS NOTES
Physical Therapy Daily Treatment Note     Name: Gerda Lai  Clinic Number: [id number]    Therapy Diagnosis:   Encounter Diagnosis   Name Primary?    Pain in both knees, unspecified chronicity      Physician: Clayton Robison MD    Visit Date: 12/7/2018    Medical Diagnosis: R shoulder and B knee pain  Evaluation Date: 9/7/2018  Authorization period Expiration: 12/31/2018  Plan of Care Certification Period: 11/7/2018   Visit #:17/ Visits authorized: 20  Time In:1:30    Time Out:2:30  Treatment time: 50 minutes  Total Billable Time: 30 minutes     Precautions: Standard, HTN    Subjective     Pt reports: continued B knee and B shoulder pain  Pain: 5 /10 to shoulders/neck and 210 to knees.  ( R shoulder > L).      Objective        Gerda received therapeutic exercises to develop strength, endurance, ROM, flexibility and posture for 40 minutes including:     AAROM on pulleys for shoulder flex and abduction x 2 minutes each -   Standing rows 12 x 3 with green TB   Standing B shoulder extension 12 x 3 with green TB  Scap retract/protract with T-ball supported on plinth 20 x 2  Shoulder H-abd/Add with T-ball supported on plinth 20 x 2  AROM shoulder flex with  Dowel in supine 3 x 10    AAROM Shoulder ER/IR with min manual resist 2 x 10   Breuggers ex in supine ( scap retract with ER) no resistance 3 x 10 - np  scap protract 2 x 10  Stationary bike x 10 m`inutes level 1 seat # 7   SAQ with 2# 3 x 10  Hooklying hip abd/ER with GTB 2 x 20  Hooklying hip add isometric 2 x 20      Gerda did not receive the following manual therapy techniques: Joint mobilizations were applied to the: (R) shoulder for 10 minutes, including:   GH oscillations -   Inferior/posterior GH glides grade ll -   STM (R) pec insertion , UT's      Ex's/activities not performed today  Bridges 10 x 3  Supine ham curl with legs supported on T-ball 3 x 10 - np  Lateral band walk with OTB around knees 4 x 10 with CGA - np  SLR into flexion 10 x 3 on L, 2 x  10 on R  Seated manually resisted HS curls 10 x 2 on R & L - np    Cold pack to (B) knees for pain and edema control x 10 minutes while receiving manual techniques to (R) shoulder. - np     Written Home Exercises Provided:  Patient educated to continue with previously issued HEP to tolerance.  Exercises were reviewed and Gerda was able to demonstrate them prior to the end of the session.  Gerda demonstrated good  understanding of the education provided.     PT/PTA met face to face to discuss patient's treatment plan and progress towards established goals.  Treatment will be continued as described in initial report/eval and progress notes.    Assessment     Patient ulisses Tx fairly well. Patient continues with c/o chronic B knee and shoulder pain.  She was able to perform the above ex's/activities within tolerable muscular fatigue and without increased pain. Notes some relief with manual techniques and pain is rated as tolerable post session. Will monitor and attempt to progress as tolerated.      Pt will continue to benefit from skilled outpatient physical therapy to address the deficits listed in the problem list box on initial evaluation, provide pt/family education and to maximize pt's level of independence in the home and community environment.       Plan     Continue PT toward established plan of care and goals.

## 2018-12-20 NOTE — PROGRESS NOTES
Patient reports continued right shoulder pain despite PT and injection. MRI was ordered. She also reports some hand pain and N/T. She will f/u with Dr. Quiñonez after MRI is done.

## 2018-12-21 ENCOUNTER — CLINICAL SUPPORT (OUTPATIENT)
Dept: REHABILITATION | Facility: HOSPITAL | Age: 83
End: 2018-12-21
Payer: MEDICARE

## 2018-12-21 PROCEDURE — G8984 CARRY CURRENT STATUS: HCPCS | Mod: CJ,PO

## 2018-12-21 PROCEDURE — G8986 CARRY D/C STATUS: HCPCS | Mod: CJ,PO

## 2018-12-21 PROCEDURE — 97530 THERAPEUTIC ACTIVITIES: CPT | Mod: PO

## 2018-12-21 PROCEDURE — 97110 THERAPEUTIC EXERCISES: CPT | Mod: PO

## 2018-12-21 PROCEDURE — G8985 CARRY GOAL STATUS: HCPCS | Mod: CJ,PO

## 2018-12-21 PROCEDURE — L3763 EWHO RIGID W/O JNTS CF: HCPCS | Mod: PO

## 2018-12-21 NOTE — PROGRESS NOTES
Physical Therapy Daily Treatment Note     Name: Gerda Lai  Clinic Number: [id number]    Therapy Diagnosis:   No diagnosis found.  Physician: Clayton Robison MD    Visit Date: 12/21/2018    Medical Diagnosis: R shoulder and B knee pain  Evaluation Date: 9/7/2018  Authorization period Expiration: 12/31/2018  Plan of Care Certification Period: 11/7/2018   Visit #:19/ Visits authorized: 20  Time In:1:30 ( Patient with late arrival)   Time Out:2:30  Treatment time: 50 minutes  Total Billable Time: 25 minutes     Precautions: Standard, HTN    Subjective     Pt reports: That she saw the PA and was for her shoulder and was scheduled for an appointment with the Dr.  She also stated that she has a massage prior to tx today and her R shoulder is moving much better.  Pain: 4/10 to shoulders/neck and 3/10 to knees.  ( R shoulder > L).      Objective         CMS Impairment/Limitation/Restriction for FOTO Shoulder Survey     Therapist reviewed FOTO scores for Gerda Lai on 9/7/2018.   FOTO documents entered into [employer] - see Media section.     Limitation Score: 64%  Category: Carrying     Current : CJ= at least 40% but < 60% impaired, limited or restricted  Goal: CJ = at least 20% but < 40% impaired, limited or restricted  Discharge: ROBERT Lorenz received therapeutic exercises to develop strength, endurance, ROM, flexibility and posture for 30 minutes including:     AAROM on pulleys for shoulder flex and abduction x 2 minutes for  Flexion and 1 minute for abd (stopped abd stretch due to increased pain)    Standing rows 10 x 3 with [place] TB   Standing B shoulder extension 10 x 3 with [place] TB  Scap retract/protract with T-ball supported on plinth 20 x 2  Shoulder H-abd/Add with T-ball supported on plinth 20 x 2  AROM shoulder flex with  Dowel in supine 3 x 10    AAROM Shoulder ER/IR with min manual resist 2 x 10 - np  Breuggers ex in sitting ( scap retract with ER) no resistance 3 x 10  scap protract 2 x 10 -  np  Stationary bike x 10 m`inutes level 1 seat # 7     Gerda did not receive the following manual therapy techniques: Joint mobilizations were applied to the: (R) shoulder for 15 minutes, including: No manual therapy was performed today.  GH oscillations -   Inferior/posterior GH glides grade ll -   STM (R) pec insertion , UT's      Ex's/activities not performed today  SAQ with 2# 3 x 10  Bridges 10 x 3  Hooklying hip abd/ER with GTB 2 x 20  Hooklying hip add isometric 2 x 20  Supine ham curl with legs supported on T-ball 3 x 10 - np  Lateral band walk with OTB around knees 4 x 10 with CGA - np  SLR into flexion 10 x 3 on L, 2 x 10 on R  Seated manually resisted HS curls 10 x 2 on R & L - np    Cold pack applied to (R) shoulder x 10 minutes for pain control.      Written Home Exercises Provided:  Patient educated to continue with previously issued HEP to tolerance.  Exercises were reviewed and Gerda was able to demonstrate them prior to the end of the session.  Gerda demonstrated good  understanding of the education provided.     PT/PTA met face to face to discuss patient's treatment plan and progress towards established goals.  Treatment will be continued as described in initial report/eval and progress notes.    Assessment     Patient ulisses Tx well as her R shoulder was not as sore following her massage. Pt may not make it back in for a Tx this year as she will not see the Orthopedist until 1/15/2019  Pt will continue to benefit from skilled outpatient physical therapy to address the deficits listed in the problem list box on initial evaluation, provide pt/family education and to maximize pt's level of independence in the home and community environment.      Short Term GOALS: 6 weeks. Pt agrees with goals set.  Pt independent in a HEP to address functional deficits  Decrease pain at worse to </=6/10 with ADL's - pain is intermittent  Improve B LE MMT - met     Long Term GOALS: 12  weeks. Pt agrees with goals  "set.  Pt to report decreased pain "at worse" to 4/10 with ADL's - pain is intermittent  Improve MMT in B LE's to allow Pt to ambulate without an assistive device - met  Improve functional ROM in R knee and R shoulder to improve functional abilities. - met  Pt to indicat improved function through an improved score on the FOTO Shoulder survey - met    Plan     Discontinue PT at this time.  Pt to continue with a HEP.  She is also to see an Orthopedic surgeon next month to evaluate her R shoulder.  "

## 2018-12-26 ENCOUNTER — HOSPITAL ENCOUNTER (OUTPATIENT)
Dept: RADIOLOGY | Facility: HOSPITAL | Age: 83
Discharge: HOME | End: 2018-12-26
Attending: PHYSICIAN ASSISTANT
Payer: MEDICARE

## 2018-12-26 DIAGNOSIS — M25.511 RIGHT SHOULDER PAIN, UNSPECIFIED CHRONICITY: ICD-10-CM

## 2018-12-26 PROCEDURE — 73221 MRI JOINT UPR EXTREM W/O DYE: CPT | Mod: TC,RT

## 2018-12-26 PROCEDURE — 73221 MRI JOINT UPR EXTREM W/O DYE: CPT | Mod: 26,RT,, | Performed by: RADIOLOGY

## 2019-01-03 ENCOUNTER — LAB VISIT (OUTPATIENT)
Dept: LAB | Facility: HOSPITAL | Age: 84
End: 2019-01-03
Attending: FAMILY MEDICINE
Payer: MEDICARE

## 2019-01-03 DIAGNOSIS — I10 HYPERTENSION, ESSENTIAL: ICD-10-CM

## 2019-01-03 DIAGNOSIS — Z00.00 ANNUAL PHYSICAL EXAM: ICD-10-CM

## 2019-01-03 LAB
ALBUMIN SERPL BCP-MCNC: 3.8 G/DL
ALP SERPL-CCNC: 64 U/L
ALT SERPL W/O P-5'-P-CCNC: 16 U/L
ANION GAP SERPL CALC-SCNC: 11 MMOL/L
AST SERPL-CCNC: 23 U/L
BASOPHILS # BLD AUTO: 0.04 K/UL
BASOPHILS NFR BLD: 0.5 %
BILIRUB SERPL-MCNC: 1 MG/DL
BUN SERPL-MCNC: 26 MG/DL
CALCIUM SERPL-MCNC: 9.9 MG/DL
CHLORIDE SERPL-SCNC: 110 MMOL/L
CHOLEST SERPL-MCNC: 175 MG/DL
CHOLEST/HDLC SERPL: 3.7 {RATIO}
CO2 SERPL-SCNC: 22 MMOL/L
CREAT SERPL-MCNC: 0.9 MG/DL
DIFFERENTIAL METHOD: ABNORMAL
EOSINOPHIL # BLD AUTO: 0.2 K/UL
EOSINOPHIL NFR BLD: 3.1 %
ERYTHROCYTE [DISTWIDTH] IN BLOOD BY AUTOMATED COUNT: 12.4 %
EST. GFR  (AFRICAN AMERICAN): >60 ML/MIN/1.73 M^2
EST. GFR  (NON AFRICAN AMERICAN): 58.5 ML/MIN/1.73 M^2
GLUCOSE SERPL-MCNC: 94 MG/DL
HCT VFR BLD AUTO: 35.1 %
HDLC SERPL-MCNC: 47 MG/DL
HDLC SERPL: 26.9 %
HGB BLD-MCNC: 11.7 G/DL
IMM GRANULOCYTES # BLD AUTO: 0.03 K/UL
IMM GRANULOCYTES NFR BLD AUTO: 0.4 %
LDLC SERPL CALC-MCNC: 99.2 MG/DL
LYMPHOCYTES # BLD AUTO: 2.6 K/UL
LYMPHOCYTES NFR BLD: 34.4 %
MCH RBC QN AUTO: 33.4 PG
MCHC RBC AUTO-ENTMCNC: 33.3 G/DL
MCV RBC AUTO: 100 FL
MONOCYTES # BLD AUTO: 0.9 K/UL
MONOCYTES NFR BLD: 12 %
NEUTROPHILS # BLD AUTO: 3.7 K/UL
NEUTROPHILS NFR BLD: 49.6 %
NONHDLC SERPL-MCNC: 128 MG/DL
NRBC BLD-RTO: 0 /100 WBC
PLATELET # BLD AUTO: 225 K/UL
PMV BLD AUTO: 9.5 FL
POTASSIUM SERPL-SCNC: 4 MMOL/L
PROT SERPL-MCNC: 6.9 G/DL
RBC # BLD AUTO: 3.5 M/UL
SODIUM SERPL-SCNC: 143 MMOL/L
TRIGL SERPL-MCNC: 144 MG/DL
TSH SERPL DL<=0.005 MIU/L-ACNC: 1.67 UIU/ML
WBC # BLD AUTO: 7.44 K/UL

## 2019-01-03 PROCEDURE — 80053 COMPREHEN METABOLIC PANEL: CPT

## 2019-01-03 PROCEDURE — 80061 LIPID PANEL: CPT

## 2019-01-03 PROCEDURE — 36415 COLL VENOUS BLD VENIPUNCTURE: CPT | Mod: PO

## 2019-01-03 PROCEDURE — 85025 COMPLETE CBC W/AUTO DIFF WBC: CPT

## 2019-01-03 PROCEDURE — 84443 ASSAY THYROID STIM HORMONE: CPT

## 2019-01-07 ENCOUNTER — OFFICE VISIT (OUTPATIENT)
Dept: FAMILY MEDICINE | Facility: CLINIC | Age: 84
End: 2019-01-07
Payer: MEDICARE

## 2019-01-07 ENCOUNTER — LAB VISIT (OUTPATIENT)
Dept: LAB | Facility: HOSPITAL | Age: 84
End: 2019-01-07
Attending: FAMILY MEDICINE
Payer: MEDICARE

## 2019-01-07 VITALS
RESPIRATION RATE: 20 BRPM | WEIGHT: 141.75 LBS | HEIGHT: 64 IN | TEMPERATURE: 98 F | SYSTOLIC BLOOD PRESSURE: 110 MMHG | DIASTOLIC BLOOD PRESSURE: 70 MMHG | BODY MASS INDEX: 24.2 KG/M2

## 2019-01-07 DIAGNOSIS — D53.1 MEGALOBLASTIC ANEMIA: ICD-10-CM

## 2019-01-07 DIAGNOSIS — D53.1 MEGALOBLASTIC ANEMIA: Primary | ICD-10-CM

## 2019-01-07 DIAGNOSIS — Z00.00 ROUTINE GENERAL MEDICAL EXAMINATION AT A HEALTH CARE FACILITY: ICD-10-CM

## 2019-01-07 DIAGNOSIS — C50.011 MALIGNANT NEOPLASM OF NIPPLE OF RIGHT BREAST IN FEMALE, UNSPECIFIED ESTROGEN RECEPTOR STATUS: ICD-10-CM

## 2019-01-07 DIAGNOSIS — E78.00 PURE HYPERCHOLESTEROLEMIA: ICD-10-CM

## 2019-01-07 LAB
FOLATE SERPL-MCNC: 15.7 NG/ML
VIT B12 SERPL-MCNC: 668 PG/ML

## 2019-01-07 PROCEDURE — 99397 PER PM REEVAL EST PAT 65+ YR: CPT | Mod: S$PBB,,, | Performed by: FAMILY MEDICINE

## 2019-01-07 PROCEDURE — 99397 PR PREVENTIVE VISIT,EST,65 & OVER: ICD-10-PCS | Mod: S$PBB,,, | Performed by: FAMILY MEDICINE

## 2019-01-07 PROCEDURE — 36415 COLL VENOUS BLD VENIPUNCTURE: CPT | Mod: PO

## 2019-01-07 PROCEDURE — 99213 OFFICE O/P EST LOW 20 MIN: CPT | Mod: PBBFAC,PO | Performed by: FAMILY MEDICINE

## 2019-01-07 PROCEDURE — 82746 ASSAY OF FOLIC ACID SERUM: CPT

## 2019-01-07 PROCEDURE — 82607 VITAMIN B-12: CPT

## 2019-01-07 PROCEDURE — 99999 PR PBB SHADOW E&M-EST. PATIENT-LVL III: ICD-10-PCS | Mod: PBBFAC,,, | Performed by: FAMILY MEDICINE

## 2019-01-07 PROCEDURE — 99999 PR PBB SHADOW E&M-EST. PATIENT-LVL III: CPT | Mod: PBBFAC,,, | Performed by: FAMILY MEDICINE

## 2019-01-07 NOTE — PROGRESS NOTES
Subjective:       Patient ID: Gerda Lai is a 85 y.o. female.    Chief Complaint: Annual Exam    86 yo presents today for routine exam, last exam one year ago  Doing well overall, except for multiple arthralgias.  Recently seen by Orthopedics with diagnosis of osteoarthritis knee, right shoulder.  Presently undergoing PT  Immunizations:  [facility]  Has planned f/u with Oncology for history of breast cancer      Review of Systems   Constitutional: Negative.  Negative for activity change, appetite change, chills, diaphoresis, fatigue, fever and unexpected weight change.   HENT: Negative.  Negative for congestion, ear pain, hearing loss, rhinorrhea, sinus pressure, sore throat, tinnitus, trouble swallowing and voice change.    Eyes: Negative.  Negative for photophobia, pain and visual disturbance.   Respiratory: Negative.  Negative for cough, chest tightness and shortness of breath.    Cardiovascular: Negative.  Negative for chest pain, palpitations and leg swelling.   Gastrointestinal: Negative.  Negative for abdominal distention, abdominal pain, blood in stool, constipation, diarrhea, nausea and vomiting.   Genitourinary: Negative.  Negative for difficulty urinating, dysuria, frequency and hematuria.   Musculoskeletal: Positive for arthralgias and back pain. Negative for joint swelling, neck pain and neck stiffness.   Skin: Negative for color change, pallor and rash.   Neurological: Negative.  Negative for dizziness, tremors, speech difficulty, weakness, light-headedness, numbness and headaches.   Hematological: Negative for adenopathy. Does not bruise/bleed easily.   Psychiatric/Behavioral: Negative for agitation, confusion, dysphoric mood, hallucinations and sleep disturbance. The patient is not nervous/anxious.        Objective:      Physical Exam   Constitutional: She is oriented to person, place, and time. She appears well-developed and well-nourished.   HENT:   Right Ear: Tympanic membrane, external ear and ear  canal normal.   Left Ear: Tympanic membrane, external ear and ear canal normal.   Nose: Nose normal.   Mouth/Throat: Oropharynx is clear and moist. No posterior oropharyngeal erythema.   Eyes: Conjunctivae and EOM are normal. Pupils are equal, round, and reactive to light.   Neck: Normal range of motion. Neck supple. No tracheal deviation present. No thyromegaly present.   Cardiovascular: Normal rate, regular rhythm, normal heart sounds and intact distal pulses.   Pulmonary/Chest: Effort normal. She has no wheezes. She has no rales. She exhibits no tenderness.   Abdominal: Soft. Bowel sounds are normal. She exhibits no distension and no mass. There is no rebound.   Musculoskeletal: Normal range of motion. She exhibits tenderness and deformity. She exhibits no edema.   Swelling over right proximal clavicle   Lymphadenopathy:     She has no cervical adenopathy.   Neurological: She is alert and oriented to person, place, and time. She has normal reflexes. No cranial nerve deficit. Coordination normal.   Skin: Skin is warm and dry. No rash noted. No erythema.   Psychiatric: She has a normal mood and affect. Her behavior is normal.       Assessment:         1.  Physical exam  2.  Hypertension  3.  Hyperlipidemia  4.  Breast cancer  5.  DJD  6.  Megaloblastic anemia  Plan:       1.  Labs reviewed with pt  2.  Continue present medications  3.  Folate, B12 levels

## 2019-01-08 ENCOUNTER — CLINICAL SUPPORT (OUTPATIENT)
Dept: REHABILITATION | Facility: HOSPITAL | Age: 84
End: 2019-01-08
Payer: MEDICARE

## 2019-01-08 ENCOUNTER — TELEPHONE (OUTPATIENT)
Dept: FAMILY MEDICINE | Facility: CLINIC | Age: 84
End: 2019-01-08

## 2019-01-08 DIAGNOSIS — G89.29 CHRONIC RIGHT SHOULDER PAIN: Primary | ICD-10-CM

## 2019-01-08 DIAGNOSIS — M25.562 CHRONIC PAIN OF BOTH KNEES: ICD-10-CM

## 2019-01-08 DIAGNOSIS — M25.561 CHRONIC PAIN OF BOTH KNEES: ICD-10-CM

## 2019-01-08 DIAGNOSIS — G89.29 CHRONIC PAIN OF BOTH KNEES: ICD-10-CM

## 2019-01-08 DIAGNOSIS — M25.511 CHRONIC RIGHT SHOULDER PAIN: Primary | ICD-10-CM

## 2019-01-08 PROCEDURE — 97110 THERAPEUTIC EXERCISES: CPT | Mod: PO

## 2019-01-08 PROCEDURE — 97530 THERAPEUTIC ACTIVITIES: CPT | Mod: PO

## 2019-01-09 ENCOUNTER — PES CALL (OUTPATIENT)
Dept: ADMINISTRATIVE | Facility: CLINIC | Age: 84
End: 2019-01-09

## 2019-01-15 NOTE — PROGRESS NOTES
Physical Therapy Daily Treatment Note     Name: Gerda Lai  Clinic Number: [id number]    Therapy Diagnosis:   Encounter Diagnoses   Name Primary?    Chronic pain of both knees     Chronic right shoulder pain Yes     Physician: Claytno Robison MD    Visit Date: 1/8/2019    Medical Diagnosis: R shoulder and B knee pain  Evaluation Date: 9/7/2018  Authorization period Expiration: 12/31/2018  Plan of Care Certification Period: 11/7/2018   Visit #:19/ Visits authorized: 20  Time In:1:15    Time Out:2:00  Treatment time: 45 minutes  Total Billable Time: 30 minutes     Precautions: Standard, HTN    Subjective     Pt reports: That she saw the PA and was for her shoulder and was scheduled for an appointment with the Dr.  She also stated that she has a massage prior to tx today and her R shoulder is moving much better.  Pain: 4/10 to shoulders/neck and 3/10 to knees.  ( R shoulder > L).      Objective         CMS Impairment/Limitation/Restriction for FOTO Shoulder Survey     Therapist reviewed FOTO scores for Gerda Lai on 9/7/2018.   FOTO documents entered into [employer] - see Media section.     Limitation Score: 64%  Category: Carrying     Current : CJ= at least 40% but < 60% impaired, limited or restricted  Goal: CJ = at least 20% but < 40% impaired, limited or restricted  Discharge:        Gerda received therapeutic exercises to develop strength, endurance, ROM, flexibility and posture for 30 minutes including:     AAROM on pulleys for shoulder flex and abduction x 2 minutes for  Flexion and 1 minute for abd (stopped abd stretch due to increased pain)    Standing rows 10 x 3 with [place] TB   Standing B shoulder extension 10 x 3 with [place] TB  Scap retract/protract with T-ball supported on plinth 20 x 2  Shoulder H-abd/Add with T-ball supported on plinth 20 x 2  AROM shoulder flex with  Dowel in supine 3 x 10    AAROM Shoulder ER/IR with min manual resist 2 x 10 - np  Breuggers ex in sitting ( scap retract with  ER) no resistance 3 x 10  scap protract 2 x 10 - np  Stationary bike x 10 m`inutes level 1 seat # 7     Gerda did not receive the following manual therapy techniques: Joint mobilizations were applied to the: (R) shoulder for 00 minutes, including: No manual therapy was performed today.  GH oscillations -   Inferior/posterior GH glides grade ll -   STM (R) pec insertion , UT's      Ex's/activities not performed today  SAQ with 2# 3 x 10  Bridges 10 x 3  Hooklying hip abd/ER with GTB 2 x 20  Hooklying hip add isometric 2 x 20  Supine ham curl with legs supported on T-ball 3 x 10 - np  Lateral band walk with OTB around knees 4 x 10 with CGA - np  SLR into flexion 10 x 3 on L, 2 x 10 on R  Seated manually resisted HS curls 10 x 2 on R & L - np    Cold pack applied to (R) shoulder x 10 minutes for pain control.      Written Home Exercises Provided:  Patient educated to continue with previously issued HEP to tolerance.  Exercises were reviewed and Gerda was able to demonstrate them prior to the end of the session.  Gerda demonstrated good  understanding of the education provided.     PT/PTA met face to face to discuss patient's treatment plan and progress towards established goals.  Treatment will be continued as described in initial report/eval and progress notes.    Assessment     Patient ulisses Tx well as her R shoulder was not as sore following her massage. Pt may not make it back in for a Tx this year as she will not see the Orthopedist until 1/15/2019  Pt will continue to benefit from skilled outpatient physical therapy to address the deficits listed in the problem list box on initial evaluation, provide pt/family education and to maximize pt's level of independence in the home and community environment.      Short Term GOALS: 6 weeks. Pt agrees with goals set.  Pt independent in a HEP to address functional deficits - met  Decrease pain at worse to </=6/10 with ADL's - pain is intermittent - met  Improve B LE MMT -  "met     Long Term GOALS: 12  weeks. Pt agrees with goals set.  Pt to report decreased pain "at worse" to 2/10 with ADL's - pain is intermittent  Improve MMT in B LE's to allow Pt to ambulate without an assistive device - intermittent use of cane  Improve functional ROM in R knee and R shoulder to improve functional abilities. - met  Pt to indicat improved function through an improved score on the FOTO Shoulder survey - met    Plan   PT to continue Physical Therapy 1 x/week to address R shoulder pain and weakness as well as B knee pain and B LE weakness to improve UE function and safe ambulation .   "

## 2019-01-22 ENCOUNTER — TELEPHONE (OUTPATIENT)
Dept: OTOLARYNGOLOGY | Facility: CLINIC | Age: 84
End: 2019-01-22

## 2019-01-23 ENCOUNTER — TELEPHONE (OUTPATIENT)
Dept: OTOLARYNGOLOGY | Facility: CLINIC | Age: 84
End: 2019-01-23

## 2019-01-24 ENCOUNTER — OFFICE VISIT (OUTPATIENT)
Dept: FAMILY MEDICINE | Facility: CLINIC | Age: 84
End: 2019-01-24
Payer: MEDICARE

## 2019-01-24 VITALS
WEIGHT: 145.06 LBS | HEIGHT: 63 IN | SYSTOLIC BLOOD PRESSURE: 136 MMHG | OXYGEN SATURATION: 99 % | BODY MASS INDEX: 25.7 KG/M2 | HEART RATE: 60 BPM | DIASTOLIC BLOOD PRESSURE: 72 MMHG

## 2019-01-24 DIAGNOSIS — Z85.828 PERSONAL HISTORY OF SKIN CANCER: ICD-10-CM

## 2019-01-24 DIAGNOSIS — Z00.00 ENCOUNTER FOR PREVENTIVE HEALTH EXAMINATION: Primary | ICD-10-CM

## 2019-01-24 DIAGNOSIS — E78.00 PURE HYPERCHOLESTEROLEMIA: ICD-10-CM

## 2019-01-24 DIAGNOSIS — I10 BENIGN ESSENTIAL HTN: ICD-10-CM

## 2019-01-24 DIAGNOSIS — Z85.3 PERSONAL HISTORY OF BREAST CANCER: ICD-10-CM

## 2019-01-24 DIAGNOSIS — N18.30 CKD (CHRONIC KIDNEY DISEASE), STAGE III: ICD-10-CM

## 2019-01-24 DIAGNOSIS — M54.17 LUMBOSACRAL RADICULOPATHY AT L5: ICD-10-CM

## 2019-01-24 DIAGNOSIS — M19.90 OSTEOARTHRITIS, UNSPECIFIED OSTEOARTHRITIS TYPE, UNSPECIFIED SITE: ICD-10-CM

## 2019-01-24 DIAGNOSIS — I70.0 AORTIC ATHEROSCLEROSIS: ICD-10-CM

## 2019-01-24 DIAGNOSIS — I10 ESSENTIAL HYPERTENSION: ICD-10-CM

## 2019-01-24 PROCEDURE — 99999 PR PBB SHADOW E&M-EST. PATIENT-LVL V: ICD-10-PCS | Mod: PBBFAC,,, | Performed by: NURSE PRACTITIONER

## 2019-01-24 PROCEDURE — G0439 PR MEDICARE ANNUAL WELLNESS SUBSEQUENT VISIT: ICD-10-PCS | Mod: S$GLB,,, | Performed by: NURSE PRACTITIONER

## 2019-01-24 PROCEDURE — 99215 OFFICE O/P EST HI 40 MIN: CPT | Mod: PBBFAC,PO | Performed by: NURSE PRACTITIONER

## 2019-01-24 PROCEDURE — 99999 PR PBB SHADOW E&M-EST. PATIENT-LVL V: CPT | Mod: PBBFAC,,, | Performed by: NURSE PRACTITIONER

## 2019-01-24 PROCEDURE — G0439 PPPS, SUBSEQ VISIT: HCPCS | Mod: S$GLB,,, | Performed by: NURSE PRACTITIONER

## 2019-01-24 RX ORDER — LABETALOL 100 MG/1
TABLET, FILM COATED ORAL
Qty: 135 TABLET | Refills: 1 | Status: SHIPPED | OUTPATIENT
Start: 2019-01-24 | End: 2019-04-19 | Stop reason: SDUPTHER

## 2019-01-24 NOTE — PATIENT INSTRUCTIONS
Counseling and Referral of Other Preventative  (Italic type indicates deductible and co-insurance are waived)    Patient Name: Gerda Lai  Today's Date: 1/24/2019    Health Maintenance       Date Due Completion Date    DEXA SCAN 02/20/2019 (Originally 4/21/2017) 4/21/2014    Zoster Vaccine 03/15/2019 (Originally 2/14/1993) ---    Lipid Panel 01/03/2024 1/3/2019    TETANUS VACCINE 08/04/2025 8/4/2015        No orders of the defined types were placed in this encounter.    The following information is provided to all patients.  This information is to help you find resources for any of the problems found today that may be affecting your health:                Living healthy guide: www.[facility].[place].gov      Understanding Diabetes: www.diabetes.org      Eating healthy: www.cdc.gov/healthyweight      [facility] home safety checklist: www.cdc.gov/steadi/patient.html      Agency on Aging: www.goea.[place].[facility]      Alcoholics anonymous (AA): www.aa.org      Physical Activity: www.[place].nih.gov/uh4fyky      Tobacco use: www.quitwithusla.org

## 2019-01-24 NOTE — PROGRESS NOTES
"Gerda Lai presented for a  Medicare AWV and comprehensive Health Risk Assessment today. The following components were reviewed and updated:    · Medical history  · Family History  · Social history  · Allergies and Current Medications  · Health Risk Assessment  · Health Maintenance  · Care Team     ** See Completed Assessments for Annual Wellness Visit within the encounter summary.**       The following assessments were completed:  · Living Situation  · CAGE  · Depression Screening  · Timed Get Up and Go  · Whisper Test  · Cognitive Function Screening  · Nutrition Screening  · ADL Screening  · PAQ Screening    I offered to discuss end of life issues, including information on how to make advance directives that the patient could use to name someone who would make medical decisions on their behalf if they became too ill to make themselves.    ___Patient declined  _X_Patient is interested, I provided paper work and offered to discuss.    Vitals:    01/24/19 1414   BP: 136/72   BP Location: Left arm   Patient Position: Sitting   BP Method: Medium (Manual)   Pulse: 60   SpO2: 99%   Weight: 65.8 kg (145 lb 1 oz)   Height: 5' 3" (1.6 m)     Body mass index is 25.7 kg/m².  Physical Exam   Constitutional: She is oriented to person, place, and time. She appears well-developed and well-nourished. No distress.   HENT:   Head: Normocephalic.   Eyes: No scleral icterus.   Cardiovascular: Normal rate, regular rhythm and normal heart sounds.   Pulmonary/Chest: Effort normal and breath sounds normal. No stridor. No respiratory distress. She has no wheezes. She has no rales.   Musculoskeletal: She exhibits no edema.   Neurological: She is alert and oriented to person, place, and time.   Skin: Skin is warm and dry. She is not diaphoretic.   Psychiatric: She has a normal mood and affect. Her behavior is normal.   Nursing note and vitals reviewed.        Diagnoses and health risks identified today and associated " recommendations/orders:    1. Encounter for preventive health examination  - Screenings performed, as noted above. Personal preventative testing needs reviewed.     2. Personal history of breast cancer  - Stable, followed by PCP, oncology    3. Personal history of skin cancer  - Stable, followed by dermatology    4. Osteoarthritis, unspecified osteoarthritis type, unspecified site  - Stable, followed by PCP, orthopedics, and rheumatology    5. Lumbosacral radiculopathy at L5  - Stable, followed by PCP.     6. Benign essential HTN  - Controlled, followed by PCP and cardiology    7. Pure hypercholesterolemia  - Controlled, followed by PCP and cardiology    8. Aortic atherosclerosis  - Stable, chronic finding, seen on imaging. Lipid level and BP are managed.     9. CKD (chronic kidney disease), stage III  - GFR improving, followed by PCP      Agustin Lorenz with a 5-10 year written screening schedule and personal prevention plan. Recommendations were developed using the USPSTF age appropriate recommendations. Education, counseling, and referrals were provided as needed. After Visit Summary printed and given to patient which includes a list of additional screenings\tests needed.    Follow-up in about 1 year (around 1/24/2020) for your next annual wellness visit.    Amy Bowers NP

## 2019-01-28 ENCOUNTER — HOSPITAL ENCOUNTER (OUTPATIENT)
Dept: RADIOLOGY | Facility: HOSPITAL | Age: 84
Discharge: HOME | End: 2019-01-28
Attending: ORTHOPAEDIC SURGERY
Payer: MEDICARE

## 2019-01-28 ENCOUNTER — TELEPHONE (OUTPATIENT)
Dept: ORTHOPEDICS | Facility: CLINIC | Age: 84
End: 2019-01-28

## 2019-01-28 DIAGNOSIS — M25.511 RIGHT SHOULDER PAIN, UNSPECIFIED CHRONICITY: ICD-10-CM

## 2019-01-28 DIAGNOSIS — M79.641 RIGHT HAND PAIN: ICD-10-CM

## 2019-01-28 DIAGNOSIS — M79.641 RIGHT HAND PAIN: Primary | ICD-10-CM

## 2019-01-28 PROCEDURE — 73030 X-RAY EXAM OF SHOULDER: CPT | Mod: TC,PO,RT

## 2019-01-28 PROCEDURE — 73130 X-RAY EXAM OF HAND: CPT | Mod: 26,RT,, | Performed by: RADIOLOGY

## 2019-01-28 PROCEDURE — 73030 XR SHOULDER TRAUMA 3 VIEW RIGHT: ICD-10-PCS | Mod: 26,RT,, | Performed by: RADIOLOGY

## 2019-01-28 PROCEDURE — 73130 X-RAY EXAM OF HAND: CPT | Mod: TC,PO,RT

## 2019-01-28 PROCEDURE — 73030 X-RAY EXAM OF SHOULDER: CPT | Mod: 26,RT,, | Performed by: RADIOLOGY

## 2019-01-28 PROCEDURE — 73130 XR HAND COMPLETE 3 VIEW RIGHT: ICD-10-PCS | Mod: 26,RT,, | Performed by: RADIOLOGY

## 2019-01-28 NOTE — TELEPHONE ENCOUNTER
Gerda Lai reminded of appointment on 1/29/19 with Dr. JOSE MARIA Kern w/time and location. Notified of need for xray before OV w/date, time, and location of appts.  Pt getting xray today

## 2019-01-29 ENCOUNTER — OFFICE VISIT (OUTPATIENT)
Dept: ORTHOPEDICS | Facility: CLINIC | Age: 84
End: 2019-01-29
Payer: MEDICARE

## 2019-01-29 VITALS
HEART RATE: 59 BPM | DIASTOLIC BLOOD PRESSURE: 71 MMHG | SYSTOLIC BLOOD PRESSURE: 132 MMHG | HEIGHT: 63 IN | BODY MASS INDEX: 25.7 KG/M2 | WEIGHT: 145.06 LBS

## 2019-01-29 DIAGNOSIS — R20.0 NUMBNESS AND TINGLING IN RIGHT HAND: ICD-10-CM

## 2019-01-29 DIAGNOSIS — M18.11 ARTHRITIS OF CARPOMETACARPAL (CMC) JOINT OF RIGHT THUMB: ICD-10-CM

## 2019-01-29 DIAGNOSIS — R20.2 NUMBNESS AND TINGLING IN RIGHT HAND: ICD-10-CM

## 2019-01-29 DIAGNOSIS — M25.511 CHRONIC RIGHT SHOULDER PAIN: Primary | ICD-10-CM

## 2019-01-29 DIAGNOSIS — G89.29 CHRONIC RIGHT SHOULDER PAIN: Primary | ICD-10-CM

## 2019-01-29 PROCEDURE — 20610 DRAIN/INJ JOINT/BURSA W/O US: CPT | Mod: PBBFAC | Performed by: ORTHOPAEDIC SURGERY

## 2019-01-29 PROCEDURE — 99999 PR PBB SHADOW E&M-EST. PATIENT-LVL III: CPT | Mod: PBBFAC,,, | Performed by: ORTHOPAEDIC SURGERY

## 2019-01-29 PROCEDURE — 99214 PR OFFICE/OUTPT VISIT, EST, LEVL IV, 30-39 MIN: ICD-10-PCS | Mod: 25,S$PBB,, | Performed by: ORTHOPAEDIC SURGERY

## 2019-01-29 PROCEDURE — 99999 PR PBB SHADOW E&M-EST. PATIENT-LVL III: ICD-10-PCS | Mod: PBBFAC,,, | Performed by: ORTHOPAEDIC SURGERY

## 2019-01-29 PROCEDURE — 20610 LARGE JOINT ASPIRATION/INJECTION: R GLENOHUMERAL: ICD-10-PCS | Mod: S$PBB,RT,, | Performed by: ORTHOPAEDIC SURGERY

## 2019-01-29 PROCEDURE — 99214 OFFICE O/P EST MOD 30 MIN: CPT | Mod: 25,S$PBB,, | Performed by: ORTHOPAEDIC SURGERY

## 2019-01-29 PROCEDURE — 99213 OFFICE O/P EST LOW 20 MIN: CPT | Mod: PBBFAC | Performed by: ORTHOPAEDIC SURGERY

## 2019-01-29 RX ADMIN — TRIAMCINOLONE ACETONIDE 80 MG: 40 INJECTION, SUSPENSION INTRA-ARTICULAR; INTRAMUSCULAR at 02:01

## 2019-01-29 NOTE — PROGRESS NOTES
I have personally taken the history and examined the patient. I agree with the Hand Surgery PA's note. The plan will be injection shoulder on right  Pt has pain at thumb CMC joint and no pain on IP.   Pt has great ROM shoulder  PLan for conservative treatment for shoulder and thumb CMC joint.

## 2019-01-29 NOTE — PROGRESS NOTES
Subjective:      Patient ID: Gerda Lai is a 85 y.o. female.    Chief Complaint: Pain of the Right Hand and Pain of the Right Shoulder      HPI  Gerda Lai is a 85 y.o. female presenting today for right shoulder and hand pain. She had a fall about one year ago and believes she may have injured her shoulder at this time. It was not painful initially however pain has become more notable over the past 6 mos. She has recently completed physical therapy with some improvement. She has also had a prior injection with pain relief. In the right hand, she notes intermittent numbness and tingling. This is more noticeable when she awakes in the mornings.      Review of patient's allergies indicates:   Allergen Reactions    Sulfa (sulfonamide antibiotics) Rash    Clarithromycin Other (See Comments)     Weak, extreme fatigue. dizziness    Flexeril [cyclobenzaprine] Other (See Comments)     Dizziness    Iodine and iodide containing products     Lisinopril Other (See Comments)     Cough and sensation of throat swelling/?angioedema    Losartan Rash    Metoprolol Swelling     Tightness in throat    Spironolactone      Throat tightening    Tramadol Other (See Comments)     Dizzy and weak    Verapamil (bulk) Palpitations    Voltaren [diclofenac sodium] Other (See Comments)     Drops blood pressure         Current Outpatient Medications   Medication Sig Dispense Refill    acetaminophen (TYLENOL) 650 MG TbSR Take 650 mg by mouth as needed (pain).       ascorbic acid (VITAMIN C) 100 MG tablet Take 100 mg by mouth 2 (two) times daily.       B INFANTIS/B ANI/B JOSE/B BIFID (PROBIOTIC 4X ORAL) Take 1 tablet by mouth daily      coenzyme Q10 (CO Q-10) 100 mg capsule Take 100 mg by mouth once daily.      glucosamine-chondroitin 500-400 mg tablet Take 1 tablet by mouth once daily.       ipratropium (ATROVENT) 0.03 % nasal spray 2 sprays by Nasal route 2 (two) times daily as needed. 30 mL 2    labetalol (NORMODYNE) 100  MG tablet TAKE 1 AND 1/2 TABLETS BY MOUTH TWICE A  tablet 1    LORazepam (ATIVAN) 0.5 MG tablet TAKE ONE-HALF TABLET BY MOUTH TWICE A DAY 60 tablet 1    multivitamin capsule Take 1 capsule by mouth once daily.      omeprazole (PRILOSEC OTC) 20 MG tablet Take 20 mg by mouth once daily.        pravastatin (PRAVACHOL) 40 MG tablet TAKE 1 TABLET (40 MG TOTAL) BY MOUTH ONCE DAILY. 90 tablet 3    spironolactone (ALDACTONE) 50 MG tablet Take 1 tablet (50 mg total) by mouth once daily. 30 tablet 11    TEKTURNA 300 mg Tab Take 300 mg by mouth once daily.        No current facility-administered medications for this visit.        Past Medical History:   Diagnosis Date    Arthritis     Basal cell carcinoma 09/2016    right post auricular neck     Breast cancer 1992    right    Cataract     Fibromyalgia     History of measles as a child     Hyperlipidemia     Hypertension     Personal history of colonic polyps     Pneumonia     SCC (squamous cell carcinoma) 2015    R chest    SCC (squamous cell carcinoma) 2016    left medial shoulder    SCC (squamous cell carcinoma) 2017    left knee    Shingles     Squamous cell carcinoma 2015    right forearm    Thyroid disease     Vaginitis        Past Surgical History:   Procedure Laterality Date    ADENOIDECTOMY      BREAST BIOPSY Left     Excisional bx, benign    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   Respiratory: Negative for cough and shortness of breath.    Gastrointestinal: Negative for nausea and vomiting.   Skin: Negative for rash.   Neurological: Negative for  "dizziness and headaches.   Psychiatric/Behavioral: Negative for depression.   MSK as in HPI       OBJECTIVE:     PHYSICAL EXAM:  /71   Pulse (!) 59   Ht 5' 3" (1.6 m)   Wt 65.8 kg (145 lb 1 oz)   BMI 25.70 kg/m²     GEN:  NAD, well-developed, well-groomed.  NEURO: Awake, alert, and oriented. Normal attention and concentration.    PSYCH: Normal mood and affect. Behavior is normal.  HEENT: No cervical lymphadenopathy noted.  CARDIOVASCULAR: Radial pulses 2+ bilaterally. No LE edema noted.  PULMONARY: Breath sounds normal. No respiratory distress.  SKIN: Intact, no rashes.      MSK:   RUE:  Good active ROM of the wrist and fingers. She has fair shoulder motion. ttp over the anterior shoulder. Positive tinels and durkans at the wrist. ttp over the thumb cmc. AIN/PIN/Radial/Median/Ulnar Nerves assessed in isolation without deficit. Radial & Ulnar arteries palpated 2+. Capillary Refill <3s.      RADIOGRAPHS:  Xray right shoulder 1/28/19  FINDINGS:  Skeletal structures show no acute fracture or dislocation.  Severe degenerative joint disease is again seen at the right shoulder with joint space narrowing, sclerosis, and marginal spurring.  Soft tissue calcifications near the inferior glenoid rim could be synovial osteochondromas.  Humeral head is again subluxed superiorly close to the acromion consistent with rotator cuff pathology.    Xray right hand 1/28/19  Impression     No acute abnormality.  Severe DJD right wrist and hand.     MRI right shoulder 12/26/18  Impression     1. Massive rotator cuff tear with moderate to severe muscle atrophy.  2. Severe glenohumeral osteoarthritis.  Glenohumeral effusion with synovitis.  3. Long head biceps tendon tear.  4. Severe acromioclavicular arthrosis.  5. Subacromial subdeltoid bursitis.     Comments: I have personally reviewed the imaging and I agree with the above radiologist's report.    ASSESSMENT/PLAN:       ICD-10-CM ICD-9-CM   1. Chronic right shoulder pain " M25.511 719.41    G89.29 338.29   2. Numbness and tingling in right hand R20.0 782.0    R20.2    3. Arthritis of carpometacarpal (CMC) joint of right thumb M18.11 716.94       Orders Placed This Encounter    Large Joint Aspiration/Injection: R glenohumeral      Plan:   -treatment options discussed.         The patient indicates understanding of these issues and agrees to the plan.    Temitope Cintron PA-C  [facility]   [facility]  [place]

## 2019-01-29 NOTE — PROCEDURES
Large Joint Aspiration/Injection: R glenohumeral  Date/Time: 1/29/2019 2:23 PM  Performed by: Annemarie Kern MD  Authorized by: Annemarie Kern MD     Consent Done?:  Yes (Verbal)  Indications:  Pain  Timeout: Prior to procedure the correct patient, procedure, and site was verified      Location:  Shoulder  Site:  R glenohumeral  Prep: Patient was prepped and draped in usual sterile fashion    Ultrasonic Guidance for needle placement: No  Needle size:  25 G  Approach:  Posterior  Medications:  80 mg triamcinolone acetonide 40 mg/mL

## 2019-01-30 RX ORDER — TRIAMCINOLONE ACETONIDE 40 MG/ML
80 INJECTION, SUSPENSION INTRA-ARTICULAR; INTRAMUSCULAR
Status: DISCONTINUED | OUTPATIENT
Start: 2019-01-29 | End: 2019-01-30 | Stop reason: HOSPADM

## 2019-01-31 ENCOUNTER — APPOINTMENT (OUTPATIENT)
Dept: RADIOLOGY | Facility: CLINIC | Age: 84
End: 2019-01-31
Attending: INTERNAL MEDICINE
Payer: MEDICARE

## 2019-01-31 DIAGNOSIS — Z78.0 MENOPAUSE: ICD-10-CM

## 2019-01-31 PROCEDURE — 77080 DXA BONE DENSITY AXIAL: CPT | Mod: 26,,, | Performed by: INTERNAL MEDICINE

## 2019-01-31 PROCEDURE — 77080 DEXA BONE DENSITY SPINE HIP: ICD-10-PCS | Mod: 26,,, | Performed by: INTERNAL MEDICINE

## 2019-01-31 PROCEDURE — 77080 DXA BONE DENSITY AXIAL: CPT | Mod: TC,PO

## 2019-02-05 ENCOUNTER — OFFICE VISIT (OUTPATIENT)
Dept: OTOLARYNGOLOGY | Facility: CLINIC | Age: 84
End: 2019-02-05
Payer: MEDICARE

## 2019-02-05 ENCOUNTER — CLINICAL SUPPORT (OUTPATIENT)
Dept: AUDIOLOGY | Facility: CLINIC | Age: 84
End: 2019-02-05
Payer: MEDICARE

## 2019-02-05 VITALS — SYSTOLIC BLOOD PRESSURE: 114 MMHG | DIASTOLIC BLOOD PRESSURE: 61 MMHG | HEART RATE: 60 BPM

## 2019-02-05 DIAGNOSIS — H90.3 SENSORINEURAL HEARING LOSS, BILATERAL: Primary | ICD-10-CM

## 2019-02-05 DIAGNOSIS — Z86.69 HISTORY OF HEARING LOSS: ICD-10-CM

## 2019-02-05 DIAGNOSIS — H61.23 BILATERAL IMPACTED CERUMEN: ICD-10-CM

## 2019-02-05 DIAGNOSIS — Z97.4 WEARS HEARING AID IN BOTH EARS: Primary | ICD-10-CM

## 2019-02-05 PROCEDURE — 99999 PR PBB SHADOW E&M-EST. PATIENT-LVL I: ICD-10-PCS | Mod: PBBFAC,,,

## 2019-02-05 PROCEDURE — 99212 OFFICE O/P EST SF 10 MIN: CPT | Mod: 25,S$PBB,, | Performed by: OTOLARYNGOLOGY

## 2019-02-05 PROCEDURE — 69210 REMOVE IMPACTED EAR WAX UNI: CPT | Mod: 50,PBBFAC | Performed by: OTOLARYNGOLOGY

## 2019-02-05 PROCEDURE — 99999 PR PBB SHADOW E&M-EST. PATIENT-LVL I: CPT | Mod: PBBFAC,,,

## 2019-02-05 PROCEDURE — 99211 OFF/OP EST MAY X REQ PHY/QHP: CPT | Mod: PBBFAC

## 2019-02-05 PROCEDURE — 92557 COMPREHENSIVE HEARING TEST: CPT | Mod: PBBFAC | Performed by: AUDIOLOGIST

## 2019-02-05 PROCEDURE — 69210 PR REMOVAL IMPACTED CERUMEN REQUIRING INSTRUMENTATION, UNILATERAL: ICD-10-PCS | Mod: S$PBB,,, | Performed by: OTOLARYNGOLOGY

## 2019-02-05 PROCEDURE — 99999 PR PBB SHADOW E&M-EST. PATIENT-LVL III: CPT | Mod: PBBFAC,,, | Performed by: OTOLARYNGOLOGY

## 2019-02-05 PROCEDURE — 99213 OFFICE O/P EST LOW 20 MIN: CPT | Mod: PBBFAC,27 | Performed by: OTOLARYNGOLOGY

## 2019-02-05 PROCEDURE — 69210 REMOVE IMPACTED EAR WAX UNI: CPT | Mod: S$PBB,,, | Performed by: OTOLARYNGOLOGY

## 2019-02-05 PROCEDURE — 99212 PR OFFICE/OUTPT VISIT, EST, LEVL II, 10-19 MIN: ICD-10-PCS | Mod: 25,S$PBB,, | Performed by: OTOLARYNGOLOGY

## 2019-02-05 PROCEDURE — 99999 PR PBB SHADOW E&M-EST. PATIENT-LVL III: ICD-10-PCS | Mod: PBBFAC,,, | Performed by: OTOLARYNGOLOGY

## 2019-02-05 PROCEDURE — 92567 TYMPANOMETRY: CPT | Mod: PBBFAC | Performed by: AUDIOLOGIST

## 2019-02-05 NOTE — PROGRESS NOTES
Ms. Lai was seen today for a hearing evaluation.     Pure tone audiometry revealed a mild to severe SNHL for the right ear and a moderate to severe SNHL, for the left ear  SRT and PTA are in good agreement bilaterally.    SRT was obtained at 35dB for the right ear with 84% speech discrimination and 45dB for the left ear with 92% speech discrimination.   Tympanometry revealed Type A, AU.     Recommendations:  1. Otologic Evaluation  2. Annual Audiogram  3. Hearing Protection  4. Hearing Aid Consult/Continue use of amplification.

## 2019-02-05 NOTE — PATIENT INSTRUCTIONS
Minute amounts of dry cerumen removed from both dry eacs  Audiometry reviewed; copy of audiogram provided  Pt.may consider change in hearing aid technology; ANDRA Swanson's card provided  Low salt diet sheets provided  Monitor hearing yearly

## 2019-02-06 NOTE — PROGRESS NOTES
CC: Ear cleaning +   HPI:Ms. Lai is an 5-year-old  female wearing small ITC hearing aids presently.  She is considering a change in hearing aid technologies; she is not satisfied with the fidelity of the instruments.  She is asking my advice about this.  She was last evaluated by me in February 2014 for hearing evaluation.  She had completed an audiometric study ordered by my colleague Dr. RAPHAEL Bello 2013 which indicated her bilateral 30 dB SRT scores.  There was evidence from mild to moderate hearing loss in the mid frequencies and more significant hearing loss at 4 and 8 K for both ears.  Left ear pure tone responses at 3 K was normal. She completed an audiometric study in February 2014 which again indicated 30 decibel SRT scores and mild to severe bilateral sensorineural hearing losses.  She used to be followed here by Dr. Solomon Min.  She asks how he is doing presently.    Past Medical History:   Diagnosis Date    Arthritis     Basal cell carcinoma 09/2016    right post auricular neck     Breast cancer 1992    right    Cataract     Fibromyalgia     History of measles as a child     Hyperlipidemia     Hypertension     Personal history of colonic polyps     Pneumonia     SCC (squamous cell carcinoma) 2015    R chest    SCC (squamous cell carcinoma) 2016    left medial shoulder    SCC (squamous cell carcinoma) 2017    left knee    Shingles     Squamous cell carcinoma 2015    right forearm    Thyroid disease     Vaginitis      Past Surgical History:   Procedure Laterality Date    ADENOIDECTOMY      BREAST BIOPSY Left     Excisional bx, benign    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    CATARACT EXTRACTION W/  INTRAOCULAR LENS IMPLANT Bilateral     COLONOSCOPY N/A 8/15/2013    [facility]    EYE SURGERY      JOINT REPLACEMENT      thyriodectomy      partial    tonsillectomy      TONSILLECTOMY      TOTAL HIP ARTHROPLASTY      right     Yag  Left      Her medical problem list includes fibromyalgia, personal history of breast cancer, contact dermatitis, hypertension, rosacea conjunctivitis, diverticulitis, left lower quadrant abdominal pain, bilaterally dry eyes, cataract procedures    PE:  Blood pressure 114/61 pulse 60 height 5 ft 3 in weight 145 lb  General:  Alert and oriented the SPECT a cold lady in no acute distress  Both ears were examined under the microscope in the micro procedure room.  A minute amount of dry cerumen is carefully extracted from each ear canal. The ear canals aer largely devoid of wax however.    Both eardrums are intact and clear as visualized directly.  Both middle ear spaces are well aerated.  The patient is immediately scheduled for an audiometric study today which is performed by the [facility] audiology service.  The study is duplicated below and the results are reviewed with her in detail.  The results are compared to a 2015 study.      DIAGNOSIS:     ICD-10-CM ICD-9-CM    1. Wears hearing aid in both ears Z97.4 [id number]    2. History of hearing loss Z86.69 V12.49    3. Bilateral impacted cerumen H61.23 380.4      PLAN: Minute amounts of dry cerumen removed from both dry eacs  Audiometry reviewed; copy of audiogram provided  Pt.may consider change in hearing aid technology; ANDRA Swanson's card provided  Low salt diet sheets provided  Monitor hearing yearly

## 2019-02-07 ENCOUNTER — TELEPHONE (OUTPATIENT)
Dept: RHEUMATOLOGY | Facility: CLINIC | Age: 84
End: 2019-02-07

## 2019-02-07 NOTE — TELEPHONE ENCOUNTER
The bone density shows osteopenia with increased fracture risk. Lu will make brief appt to discuss therapy options. MICHAELA

## 2019-02-11 ENCOUNTER — OFFICE VISIT (OUTPATIENT)
Dept: RHEUMATOLOGY | Facility: CLINIC | Age: 84
End: 2019-02-11
Payer: MEDICARE

## 2019-02-11 ENCOUNTER — HOSPITAL ENCOUNTER (OUTPATIENT)
Dept: RADIOLOGY | Facility: HOSPITAL | Age: 84
Discharge: HOME | End: 2019-02-11
Attending: INTERNAL MEDICINE
Payer: MEDICARE

## 2019-02-11 VITALS
HEART RATE: 64 BPM | SYSTOLIC BLOOD PRESSURE: 126 MMHG | DIASTOLIC BLOOD PRESSURE: 58 MMHG | BODY MASS INDEX: 24.75 KG/M2 | WEIGHT: 145 LBS | HEIGHT: 64 IN

## 2019-02-11 DIAGNOSIS — R53.83 FATIGUE, UNSPECIFIED TYPE: Primary | ICD-10-CM

## 2019-02-11 DIAGNOSIS — M85.80 OSTEOPENIA, UNSPECIFIED LOCATION: ICD-10-CM

## 2019-02-11 PROCEDURE — 99212 OFFICE O/P EST SF 10 MIN: CPT | Mod: S$PBB,,, | Performed by: INTERNAL MEDICINE

## 2019-02-11 PROCEDURE — 99214 OFFICE O/P EST MOD 30 MIN: CPT | Mod: PBBFAC,25 | Performed by: INTERNAL MEDICINE

## 2019-02-11 PROCEDURE — 72100 X-RAY EXAM L-S SPINE 2/3 VWS: CPT | Mod: TC,FY,PO

## 2019-02-11 PROCEDURE — 72100 X-RAY EXAM L-S SPINE 2/3 VWS: CPT | Mod: 26,,, | Performed by: RADIOLOGY

## 2019-02-11 PROCEDURE — 72100 XR LUMBAR SPINE AP AND LATERAL: ICD-10-PCS | Mod: 26,,, | Performed by: RADIOLOGY

## 2019-02-11 PROCEDURE — 99999 PR PBB SHADOW E&M-EST. PATIENT-LVL IV: ICD-10-PCS | Mod: PBBFAC,,, | Performed by: INTERNAL MEDICINE

## 2019-02-11 PROCEDURE — 99212 PR OFFICE/OUTPT VISIT, EST, LEVL II, 10-19 MIN: ICD-10-PCS | Mod: S$PBB,,, | Performed by: INTERNAL MEDICINE

## 2019-02-11 PROCEDURE — 99999 PR PBB SHADOW E&M-EST. PATIENT-LVL IV: CPT | Mod: PBBFAC,,, | Performed by: INTERNAL MEDICINE

## 2019-02-11 ASSESSMENT — ROUTINE ASSESSMENT OF PATIENT INDEX DATA (RAPID3)
PATIENT GLOBAL ASSESSMENT SCORE: 4.5
WHEN YOU AWAKENED IN THE MORNING OVER THE LAST WEEK, PLEASE INDICATE THE AMOUNT OF TIME IT TAKES UNTIL YOU ARE AS LIMBER AS YOU WILL BE FOR THE DAY: 15 MINS
AM STIFFNESS SCORE: 1, YES
PSYCHOLOGICAL DISTRESS SCORE: 1.1
FATIGUE SCORE: 4
PAIN SCORE: 4.5
MDHAQ FUNCTION SCORE: .6
TOTAL RAPID3 SCORE: 3.67

## 2019-02-11 NOTE — PROGRESS NOTES
"Subjective:       Patient ID: Gerda Lai is a 85 y.o. female.    Chief Complaint: Osteopenia with elevated FRAX  HPI here to discuss osteopenia, therapy.  Review of Systems   Constitutional: Positive for fatigue. Negative for appetite change, fever and unexpected weight change.   HENT: Negative for mouth sores.         Dry mouth   Eyes: Negative for visual disturbance.   Respiratory: Negative for cough, shortness of breath and wheezing.    Cardiovascular: Negative for chest pain and palpitations.   Gastrointestinal: Negative for abdominal pain, anal bleeding, blood in stool, constipation, diarrhea, nausea and vomiting.   Genitourinary: Negative for dysuria, frequency and urgency.   Musculoskeletal: Negative for arthralgias, back pain, gait problem, joint swelling, myalgias, neck pain and neck stiffness.   Skin: Negative for rash.   Neurological: Negative for weakness, numbness and headaches.   Hematological: Negative for adenopathy. Does not bruise/bleed easily.   Psychiatric/Behavioral: Negative for sleep disturbance. The patient is not nervous/anxious.          Objective:   BP (!) 126/58   Pulse 64   Ht 5' 3.6" (1.615 m)   Wt 65.8 kg (145 lb)   BMI 25.20 kg/m²      Physical Exam      Assessment/Plan         Problem List Items Addressed This Visit     Osteopenia    Overview     Aretha Snell MD 2/7/2019       Narrative     EXAMINATION:  DEXA BONE DENSITY SPINE HIP    CLINICAL HISTORY:  suspected osteoporosis; Asymptomatic menopausal state85 y/o female with no history of fractures.  She had a hysterectomy at 39 y/o.  She is taking 400 units of Vit D supplements.  She has a history of Breast Ca.  Pt had a right hip replacement.  She exercises daily and does not smoke.    TECHNIQUE:  DXA specification: [employer] [id number].    Bone Mineral Density scanning was performed over the hip and lumbar spine. Review of the images confirms satisfactory positioning and " technique.    COMPARISON:  Comparison study done on 04/21/2014. Lumbar spine BMD 1.478 g/cm2 and the T-score 3.9.  The Total Hip BMD 0.882 g/cm2 and the T-score -0.5.    FINDINGS:  Lumbar Spine: Lumbar bone mineral density L1-L4 is 1.502g/cm2, which is a T-score of 4.1. The Z-score is 7.0.    Total Hip: Total hip bone mineral density is 0.883g/cm2.  The T-score is -0.5, and the Z-score is 1.9.    Femoral neck: Bone mineral density is 0.711g/cm2 and the T-score is -1.2 and the Z-score is 1.3 g/cm2.    There is a 12% risk of a major osteoporotic fracture and a 3.1% risk of hip fracture in the next 10 years (FRAX).    Compared with previous DXA, BMD at the lumbar spine has increased by 1.6%, and the BMD at the total hip has remained stable.      Impression       Osteopenia of the femoral neck.  FRAX calculations do not suggest treatment.    RECOMMENDATIONS of [facility]:    1.  Calcium 5799-1378 mg daily and vitamin D 800-1000 units daily, adequate exercise.    2.  Consider x-ray of lumbar spine to evaluate very elevated Z-score of 7 and to rule out fracture at L2 which is more dense than the other vertebral bodies    3.  Repeat BMD in 2 years              Current Assessment & Plan         Relevant Orders    X-Ray Lumbar Spine AP And Lateral    PTH, intact    Vitamin D    Magnesium    PHOSPHORUS    TSH    Immunofixation electrophoresis    Calcium, Timed [facility]rine [facility]; 24 Hours    Immunofixation, 24 hour Urine      Other Visit Diagnoses     Fatigue, unspecified type    -  Primary    Relevant Orders    TSH          1200mg dietary calcium daily  Vitamin D rec based on level  Reviewed risks and benefits of alendronate(bisphosphonate) including ONJ, AFF  Reviewed dosing in detail verbally and written instructions provided  [facility] handout on bisphosphonates and alendronate provided  Plan start alendronate 35mg po once a week after above labs and dental check         Relevant Orders     X-Ray Lumbar Spine AP And Lateral    PTH, intact    Vitamin D    Magnesium    PHOSPHORUS    TSH    Immunofixation electrophoresis    Calcium, Timed Urine [facility]; 24 Hours    Immunofixation, 24 hour Urine      Other Visit Diagnoses     Fatigue, unspecified type    -  Primary    Relevant Orders    TSH

## 2019-02-11 NOTE — ASSESSMENT & PLAN NOTE
Relevant Orders    X-Ray Lumbar Spine AP And Lateral    PTH, intact    Vitamin D    Magnesium    PHOSPHORUS    TSH    Immunofixation electrophoresis    Calcium, Timed Urine [facility]; 24 Hours    Immunofixation, 24 hour Urine      Other Visit Diagnoses     Fatigue, unspecified type    -  Primary    Relevant Orders    TSH          1200mg dietary calcium daily  Vitamin D rec based on level  Reviewed risks and benefits of alendronate(bisphosphonate) including ONJ, AFF  Reviewed dosing in detail verbally and written instructions provided  [facility] handout on bisphosphonates and alendronate provided  Plan start alendronate 35mg po once a week after above labs and dental check

## 2019-02-11 NOTE — PATIENT INSTRUCTIONS
Labs today  24 hr urine calcium    If labs OK, start alendronate 35mg by mouth once a week    Take once a week with 8 oz water  Remain upright for at least 30 min  No food or drink for at least 30 min  except water  Dental check prior to start  Osteoporosis Medications: Bisphosphonates  Depending on your needs, your healthcare provider may prescribe medicines to prevent or treat osteoporosis.    Bisphosphonates  Several medicines make up the class of drugs known as bisphosphonates. Bisphosphonates are the most common type of medicine used to help prevent and treat bone loss. Bisphosphonates are given in pill or injectable form as an IV infusion. They must be taken exactly as directed. Benefits may include:  · Reducing bone loss  · Increasing bone density in the hip and spine  · Reducing risk of fractures in the spine, hip, and wrist  Side effects may include:  · Heartburn  · Nausea  · Abdominal pain  · Bone or muscle pain  Taking bisphosphonates pills  Always read medicine information closely. Certain bisphosphonates must be taken:  · On an empty stomach.  · With a full glass of water (8 oz) first thing in the morning.  · At least 30 minutes to one hour before any food, drink, or other medications.  · While sitting or standing. You should not lie down for at least 30 minutes after taking the medicine.  Newer medicines can be taken weekly or monthly. Talk with your doctor to find out which one is right for you.   Date Last Reviewed: 10/11/2015  © 0710-4184 [employer]. [address] [place] [address], [place] [address]. All rights reserved. This information is not intended as a substitute for professional medical care. Always follow your healthcare professional's instructions.        Alendronate weekly tablets  What is this medicine?  ALENDRONATE (a SAUD droe roger) slows calcium loss from bones. It helps to make healthy bone and to slow bone loss in people with osteoporosis. It may be used to treat Paget's  disease.  How should I use this medicine?  You must take this medicine exactly as directed or you will lower the amount of medicine you absorb into your body or you may cause yourself harm. Take your dose by mouth first thing in the morning, after you are up for the day. Do not eat or drink anything before you take this medicine. Swallow your medicine with a full glass (6 to 8 fluid ounces) of plain water. Do not take this tablet with any other drink. Do not chew or crush the tablet. After taking this medicine, do not eat breakfast, drink, or take any medicines or vitamins for at least 30 minutes. Stand or sit up for at least 30 minutes after you take this medicine; do not lie down. Take this medicine on the same day every week. Do not take your medicine more often than directed.  Talk to your pediatrician regarding the use of this medicine in children. Special care may be needed.  What side effects may I notice from receiving this medicine?  Side effects that you should report to your doctor or health care professional as soon as possible:  · allergic reactions like skin rash, itching or hives, swelling of the face, lips, or tongue  · black or tarry stools  · bone, muscle or joint pain  · changes in vision  · chest pain  · heartburn or stomach pain  · jaw pain, especially after dental work  · pain or trouble when swallowing  · redness, blistering, peeling or loosening of the skin, including inside the mouth  Side effects that usually do not require medical attention (report to your doctor or health care professional if they continue or are bothersome):  · changes in taste  · diarrhea or constipation  · eye pain or itching  · headache  · nausea or vomiting  · stomach gas or fullness  What may interact with this medicine?  · aluminum hydroxide  · antacids  · aspirin  · calcium supplements  · drugs for inflammation like ibuprofen, naproxen, and others  · iron supplements  · magnesium supplements  · vitamins with  minerals  What if I miss a dose?  If you miss a dose, take the dose on the morning after you remember. Then take your next dose on your regular day of the week. Never take 2 tablets on the same day. Do not take double or extra doses.  Where should I keep my medicine?  Keep out of the reach of children.  Store at room temperature of 15 and 30 degrees C (59 and 86 degrees F). Throw away any unused medicine after the expiration date.  What should I tell my health care provider before I take this medicine?  They need to know if you have any of these conditions:  · esophagus, stomach, or intestine problems, like acid-reflux or GERD  · dental disease  · kidney disease  · low blood calcium  · low vitamin D  · problems swallowing  · problems sitting or standing for 30 minutes  · an unusual or allergic reaction to alendronate, other medicines, foods, dyes, or preservatives  · pregnant or trying to get pregnant  · breast-feeding  What should I watch for while using this medicine?  Visit your doctor or health care professional for regular checks ups. It may be some time before you see benefit from this medicine. Do not stop taking your medication except on your doctor's advice. Your doctor or health care professional may order blood tests and other tests to see how you are doing.  You should make sure you get enough calcium and vitamin D while you are taking this medicine, unless your doctor tells you not to. Discuss the foods you eat and the vitamins you take with your health care professional.  Some people who take this medicine have severe bone, joint, and/or muscle pain. This medicine may also increase your risk for a broken thigh bone. Tell your doctor right away if you have pain in your upper leg or groin. Tell your doctor if you have any pain that does not go away or that gets worse.  This medicine can make you more sensitive to the sun. If you get a rash while taking this medicine, sunlight may cause the rash to get  worse. Keep out of the sun. If you cannot avoid being in the sun, wear protective clothing and use sunscreen. Do not use sun lamps or tanning beds/booths.  NOTE:This sheet is a summary. It may not cover all possible information. If you have questions about this medicine, talk to your doctor, pharmacist, or health care provider. Copyright© 2017 [employer]

## 2019-02-12 ENCOUNTER — TELEPHONE (OUTPATIENT)
Dept: RHEUMATOLOGY | Facility: CLINIC | Age: 84
End: 2019-02-12

## 2019-02-12 DIAGNOSIS — M85.80 OSTEOPENIA, UNSPECIFIED LOCATION: Primary | ICD-10-CM

## 2019-02-12 NOTE — TELEPHONE ENCOUNTER
The thyroid function, magnesium, phosphorus, parathyroid hormone, and vitamin D are normal.Suggest vitamin D3 1000 units OTC daily. She can start the alendronate(Fosamax) as soon as she has dental check. Thanks MICHAELA

## 2019-02-13 ENCOUNTER — LAB VISIT (OUTPATIENT)
Dept: LAB | Facility: HOSPITAL | Age: 84
End: 2019-02-13
Attending: INTERNAL MEDICINE
Payer: MEDICARE

## 2019-02-13 DIAGNOSIS — M85.80 OSTEOPENIA, UNSPECIFIED LOCATION: ICD-10-CM

## 2019-02-13 LAB
CALCIUM 24H UR-MRATE: 1 MG/HR
CALCIUM UR-MCNC: 2.3 MG/DL
CALCIUM URINE (MG/SPEC): 15 MG/SPEC
PROT 24H UR-MRATE: NORMAL MG/SPEC
PROT UR-MCNC: <7 MG/DL
URINE COLLECTION DURATION: 24 HR
URINE COLLECTION DURATION: 24 HR
URINE VOLUME: 650 ML
URINE VOLUME: 650 ML

## 2019-02-13 PROCEDURE — 82340 ASSAY OF CALCIUM IN URINE: CPT

## 2019-02-13 PROCEDURE — 84156 ASSAY OF PROTEIN URINE: CPT

## 2019-02-13 PROCEDURE — 86335 PATHOLOGIST INTERPRETATION UIFE: ICD-10-PCS | Mod: 26,,, | Performed by: PATHOLOGY

## 2019-02-13 PROCEDURE — 86335 IMMUNFIX E-PHORSIS/URINE/CSF: CPT | Mod: 26,,, | Performed by: PATHOLOGY

## 2019-02-13 PROCEDURE — 86335 IMMUNFIX E-PHORSIS/URINE/CSF: CPT

## 2019-02-14 LAB
INTERPRETATION UR IFE-IMP: NORMAL
PATHOLOGIST INTERPRETATION UIFE: NORMAL

## 2019-02-15 ENCOUNTER — OFFICE VISIT (OUTPATIENT)
Dept: INTERNAL MEDICINE | Facility: CLINIC | Age: 84
End: 2019-02-15
Payer: MEDICARE

## 2019-02-15 VITALS
WEIGHT: 147.25 LBS | HEART RATE: 60 BPM | BODY MASS INDEX: 26.09 KG/M2 | SYSTOLIC BLOOD PRESSURE: 136 MMHG | TEMPERATURE: 98 F | RESPIRATION RATE: 18 BRPM | HEIGHT: 63 IN | DIASTOLIC BLOOD PRESSURE: 78 MMHG

## 2019-02-15 DIAGNOSIS — B96.89 ACUTE BACTERIAL SINUSITIS: Primary | ICD-10-CM

## 2019-02-15 DIAGNOSIS — J01.90 ACUTE BACTERIAL SINUSITIS: Primary | ICD-10-CM

## 2019-02-15 PROCEDURE — 99213 PR OFFICE/OUTPT VISIT, EST, LEVL III, 20-29 MIN: ICD-10-PCS | Mod: S$PBB,,, | Performed by: INTERNAL MEDICINE

## 2019-02-15 PROCEDURE — 99213 OFFICE O/P EST LOW 20 MIN: CPT | Mod: PBBFAC,PO | Performed by: INTERNAL MEDICINE

## 2019-02-15 PROCEDURE — 99999 PR PBB SHADOW E&M-EST. PATIENT-LVL III: ICD-10-PCS | Mod: PBBFAC,,, | Performed by: INTERNAL MEDICINE

## 2019-02-15 PROCEDURE — 99213 OFFICE O/P EST LOW 20 MIN: CPT | Mod: S$PBB,,, | Performed by: INTERNAL MEDICINE

## 2019-02-15 PROCEDURE — 99999 PR PBB SHADOW E&M-EST. PATIENT-LVL III: CPT | Mod: PBBFAC,,, | Performed by: INTERNAL MEDICINE

## 2019-02-15 RX ORDER — AMOXICILLIN AND CLAVULANATE POTASSIUM 875; 125 MG/1; MG/1
1 TABLET, FILM COATED ORAL 2 TIMES DAILY
Qty: 14 TABLET | Refills: 0 | Status: SHIPPED | OUTPATIENT
Start: 2019-02-15 | End: 2019-02-22

## 2019-02-15 RX ORDER — FLUTICASONE PROPIONATE 50 MCG
1 SPRAY, SUSPENSION (ML) NASAL DAILY
Qty: 16 G | Refills: 0 | Status: SHIPPED | OUTPATIENT
Start: 2019-02-15 | End: 2019-03-14 | Stop reason: SDUPTHER

## 2019-02-15 RX ORDER — LEVOCETIRIZINE DIHYDROCHLORIDE 5 MG/1
5 TABLET, FILM COATED ORAL NIGHTLY
Qty: 30 TABLET | Refills: 0 | Status: SHIPPED | OUTPATIENT
Start: 2019-02-15 | End: 2019-03-14 | Stop reason: SDUPTHER

## 2019-02-15 RX ORDER — DICLOFENAC SODIUM 10 MG/G
GEL TOPICAL DAILY
COMMUNITY
Start: 2019-02-04 | End: 2019-07-02 | Stop reason: SDUPTHER

## 2019-02-15 NOTE — PROGRESS NOTES
Subjective:       Patient ID: Gerda Lai is a 86 y.o. female.    Chief Complaint: Cough (yellow, grayish mucus); Nasal Congestion; Chest Congestion; and Sore Throat    HPI     86-year-old female here for evaluation of coughing up yellow, gray mucus, nasal congestion, chest congestion, and sore throat.  This has gone on the last three days.  She reports that she woke up with congestion in her throat.  She had to blow her nose too.  No fevers or chills.  She had chills up her back last week.  She has sinus pressure and congestion.  She has a cough that is productive of yellow mucus.  It has become a greenish color.  No SOB.  She has a post nasal drip.  No sick contacts.    Review of Systems    Objective:      Physical Exam   Constitutional: She is oriented to person, place, and time. She appears well-developed and well-nourished.   HENT:   Head: Normocephalic and atraumatic.   Mouth/Throat: No oropharyngeal exudate.   Eyes: EOM are normal. Pupils are equal, round, and reactive to light. Right eye exhibits no discharge. Left eye exhibits no discharge. No scleral icterus.   Neck: Normal range of motion. Neck supple. No tracheal deviation present. No thyromegaly present.   Cardiovascular: Normal rate, regular rhythm and normal heart sounds. Exam reveals no gallop and no friction rub.   No murmur heard.  Pulmonary/Chest: Effort normal and breath sounds normal. No respiratory distress. She has no wheezes. She has no rales. She exhibits no tenderness.   Abdominal: Soft. Bowel sounds are normal. She exhibits no distension and no mass. There is no tenderness. There is no rebound and no guarding.   Musculoskeletal: Normal range of motion. She exhibits no edema or tenderness.   Neurological: She is alert and oriented to person, place, and time.   Skin: Skin is warm and dry. No rash noted. No erythema. No pallor.   Psychiatric: She has a normal mood and affect. Her behavior is normal.   Vitals reviewed.      Assessment:        1. Acute bacterial sinusitis        Plan:       1.  Augmentin 875 mg b.i.d. x7 days, Flonase, Xyzal.

## 2019-02-21 ENCOUNTER — CLINICAL SUPPORT (OUTPATIENT)
Dept: AUDIOLOGY | Facility: CLINIC | Age: 84
End: 2019-02-21

## 2019-02-21 DIAGNOSIS — H90.3 SENSORINEURAL HEARING LOSS, BILATERAL: Primary | ICD-10-CM

## 2019-02-21 PROCEDURE — 99499 UNLISTED E&M SERVICE: CPT | Mod: S$GLB,,, | Performed by: AUDIOLOGIST

## 2019-02-21 PROCEDURE — 99499 NO LOS: ICD-10-PCS | Mod: S$GLB,,, | Performed by: AUDIOLOGIST

## 2019-02-21 NOTE — PROGRESS NOTES
Ms. Lai was seen for a hearing aid consultation. She currently wears [employer] Aud[employer] hearing aids that are approximately 2 years old. She reports that she has only had them adjusted 1-2 times. We discussed pros and cons of various styles and technology levels of hearing aids. We discussed the acclimatization process to hearing aids and the importance of returning for regular maintenance and adjustments, as needed. We discussed updated technology. Patient acknowledged understanding of the 30 day trial period, $250 restocking fee from the time of order, and the fact that we do not file insurance on behalf of the patient. The patient is going to try to go back to her dispensing audiologist for an adjustment of her current devices before pursuing updated amplification. Patient was advised to call or email with any questions.

## 2019-03-04 ENCOUNTER — HOSPITAL ENCOUNTER (OUTPATIENT)
Dept: RADIOLOGY | Facility: HOSPITAL | Age: 84
Discharge: HOME | End: 2019-03-04
Attending: PHYSICIAN ASSISTANT
Payer: MEDICARE

## 2019-03-04 ENCOUNTER — OFFICE VISIT (OUTPATIENT)
Dept: ORTHOPEDICS | Facility: CLINIC | Age: 84
End: 2019-03-04
Payer: MEDICARE

## 2019-03-04 ENCOUNTER — TELEPHONE (OUTPATIENT)
Dept: ORTHOPEDICS | Facility: CLINIC | Age: 84
End: 2019-03-04

## 2019-03-04 VITALS
SYSTOLIC BLOOD PRESSURE: 157 MMHG | HEART RATE: 56 BPM | BODY MASS INDEX: 25.8 KG/M2 | DIASTOLIC BLOOD PRESSURE: 64 MMHG | WEIGHT: 145.63 LBS | HEIGHT: 63 IN

## 2019-03-04 DIAGNOSIS — M17.12 PRIMARY OSTEOARTHRITIS OF LEFT KNEE: ICD-10-CM

## 2019-03-04 DIAGNOSIS — R52 PAIN: ICD-10-CM

## 2019-03-04 DIAGNOSIS — M70.62 TROCHANTERIC BURSITIS OF LEFT HIP: Primary | ICD-10-CM

## 2019-03-04 PROCEDURE — 73502 X-RAY EXAM HIP UNI 2-3 VIEWS: CPT | Mod: 26,LT,, | Performed by: RADIOLOGY

## 2019-03-04 PROCEDURE — 73564 XR KNEE ORTHO LEFT WITH FLEXION: ICD-10-PCS | Mod: 26,LT,, | Performed by: RADIOLOGY

## 2019-03-04 PROCEDURE — 20610 DRAIN/INJ JOINT/BURSA W/O US: CPT | Mod: PBBFAC | Performed by: PHYSICIAN ASSISTANT

## 2019-03-04 PROCEDURE — 73564 X-RAY EXAM KNEE 4 OR MORE: CPT | Mod: 26,LT,, | Performed by: RADIOLOGY

## 2019-03-04 PROCEDURE — 73562 X-RAY EXAM OF KNEE 3: CPT | Mod: 59,TC,RT

## 2019-03-04 PROCEDURE — 99999 PR PBB SHADOW E&M-EST. PATIENT-LVL IV: ICD-10-PCS | Mod: PBBFAC,,, | Performed by: PHYSICIAN ASSISTANT

## 2019-03-04 PROCEDURE — 73562 X-RAY EXAM OF KNEE 3: CPT | Mod: 26,XS,LT, | Performed by: RADIOLOGY

## 2019-03-04 PROCEDURE — 73502 XR HIP 2 VIEW LEFT: ICD-10-PCS | Mod: 26,LT,, | Performed by: RADIOLOGY

## 2019-03-04 PROCEDURE — 20610 PR DRAIN/INJECT LARGE JOINT/BURSA: ICD-10-PCS | Mod: S$PBB,LT,, | Performed by: PHYSICIAN ASSISTANT

## 2019-03-04 PROCEDURE — 99999 PR PBB SHADOW E&M-EST. PATIENT-LVL IV: CPT | Mod: PBBFAC,,, | Performed by: PHYSICIAN ASSISTANT

## 2019-03-04 PROCEDURE — 73502 X-RAY EXAM HIP UNI 2-3 VIEWS: CPT | Mod: TC,LT

## 2019-03-04 PROCEDURE — 99213 PR OFFICE/OUTPT VISIT, EST, LEVL III, 20-29 MIN: ICD-10-PCS | Mod: 25,S$PBB,, | Performed by: PHYSICIAN ASSISTANT

## 2019-03-04 PROCEDURE — 20610 DRAIN/INJ JOINT/BURSA W/O US: CPT | Mod: S$PBB,LT,, | Performed by: PHYSICIAN ASSISTANT

## 2019-03-04 PROCEDURE — 99213 OFFICE O/P EST LOW 20 MIN: CPT | Mod: 25,S$PBB,, | Performed by: PHYSICIAN ASSISTANT

## 2019-03-04 PROCEDURE — 99214 OFFICE O/P EST MOD 30 MIN: CPT | Mod: PBBFAC,25 | Performed by: PHYSICIAN ASSISTANT

## 2019-03-04 PROCEDURE — 73562 XR KNEE ORTHO LEFT WITH FLEXION: ICD-10-PCS | Mod: 26,XS,LT, | Performed by: RADIOLOGY

## 2019-03-04 RX ORDER — METHYLPREDNISOLONE ACETATE 80 MG/ML
80 INJECTION, SUSPENSION INTRA-ARTICULAR; INTRALESIONAL; INTRAMUSCULAR; SOFT TISSUE
Status: COMPLETED | OUTPATIENT
Start: 2019-03-04 | End: 2019-03-04

## 2019-03-04 RX ADMIN — METHYLPREDNISOLONE ACETATE 80 MG: 80 INJECTION, SUSPENSION INTRALESIONAL; INTRAMUSCULAR; INTRASYNOVIAL; SOFT TISSUE at 03:03

## 2019-03-04 NOTE — TELEPHONE ENCOUNTER
Called pt and got her an appointment for 315 pm      ----- Message from Bianca Hu PA-C sent at 3/4/2019  9:02 AM CST -----  Contact: Self      ----- Message -----  From: Celina Fitzpatrick MA  Sent: 3/4/2019   8:11 AM  To: Bianca Hu PA-C     Please advise :)  ----- Message -----  From: Kika Crowley  Sent: 3/4/2019   8:06 AM  To: Fritz Valenzuela Staff    Pt is needing a call back in regards to getting a injection in her left knee. Pt is asking if she can be seen today due to her not being able to walk  Pt can be reached @[phone]

## 2019-03-04 NOTE — PROGRESS NOTES
Subjective:      Patient ID: Gerda Lai is a 86 y.o. female.    Chief Complaint: Pain of the Left Knee and Pain of the Left Hip    HPI  86 year old female presents with chief complaint of left knee and left hip pain x 3 days. No trauma. She has h/o knee OA and hip bursitis. She has been having trouble sleeping and walking due to pain. She can't lay on her left side. Hip pain is lateral. Her knee pain is worse at the lateral side. Tylenol gives little relief. She does HEP which helps. She had left knee and left hip bursa cortisone injections last August that gave her good relief. She is ambulating with a cane.   Review of Systems   Constitution: Negative for chills, fever and night sweats.   Cardiovascular: Negative for chest pain.   Respiratory: Negative for cough and shortness of breath.    Hematologic/Lymphatic: Does not bruise/bleed easily.   Skin: Negative for color change.   Gastrointestinal: Negative for heartburn.   Genitourinary: Negative for dysuria.   Neurological: Negative for numbness and paresthesias.   Psychiatric/Behavioral: Negative for altered mental status.   Allergic/Immunologic: Negative for persistent infections.         Objective:            General    Vitals reviewed.  Constitutional: She is oriented to person, place, and time. She appears well-developed and well-nourished.   Cardiovascular: Normal rate.    Neurological: She is alert and oriented to person, place, and time.         Left Hip Exam     Tenderness   The patient tender to palpation of the trochanteric bursa.    Range of Motion   The patient has normal left hip ROM.    Tests   Stinchfield test: negative  Log Roll: negative    Other   Sensation: normal          Vascular Exam       Left Pulses  Dorsalis Pedis:      2+            Left Knee Exam:  ROM 0-120 degrees  No effusion  No warmth or erythema  TTP medial and lateral joint line      X-ray knee: ordered and reviewed by myself. Severe DJD.  X-ray hip: ordered and reviewed by  myself. Right hip arthroplasty identified.  The position alignment is satisfactory and unchanged as compared to the previous study.  Mild DJD involving the left hip.  No fracture or bone destruction.  Calcification or heterotopic bone formation adjacent to the left greater trochanter identified as before.      Assessment:       Encounter Diagnoses   Name Primary?    Trochanteric bursitis of left hip Yes    Primary osteoarthritis of left knee           Plan:       Discussed treatment options with patient. She would like cortisone injections. Continue HEP. RTC prn.     PROCEDURE:  I have explained the risks, benefits, and alternatives of the procedure in detail.  The patient voices understanding and all questions have been answered.  The patient agrees to proceed as planned. So after I performed a sterile prep of the skin in the normal fashion the left knee is injected using a 22 gauge needle from the anterolateral approach with a combination of 4cc 1% plain lidocaine and 80 mg of depo medrol.  The patient is cautioned and immediate relief of pain is secondary to the local anesthetic and will be temporary.  After the anesthetic wears off there may be a increase in pain that may last for a few hours or a few days and they should use ice to help alleviate this flair up of pain.     PROCEDURE:  I have explained the risks, benefits, and alternatives of the procedure in detail.  The patient voices understanding and all questions have been answered.  The patient agrees to proceed as planned. So after I performed a sterile pre of the skin in the normal fashion the left greater trochanteric area is injected from the lateral approach using an 2 inch 22 gauge needle with a combination of 4cc 1% lidocaine and 80 mg of depo medrol.  The patient is cautioned and immediate relief of pain is secondary to the local anesthetic and will be temporary.  After the anesthetic wears off there may be a increase in pain that may last for a  few hours or a few days and they should use ice to help alleviate this flair up of pain.

## 2019-03-14 RX ORDER — LEVOCETIRIZINE DIHYDROCHLORIDE 5 MG/1
TABLET, FILM COATED ORAL
Qty: 30 TABLET | Refills: 0 | Status: SHIPPED | OUTPATIENT
Start: 2019-03-14 | End: 2019-04-11

## 2019-03-14 RX ORDER — FLUTICASONE PROPIONATE 50 MCG
SPRAY, SUSPENSION (ML) NASAL
Qty: 16 ML | Refills: 0 | Status: SHIPPED | OUTPATIENT
Start: 2019-03-14 | End: 2019-04-11

## 2019-03-16 ENCOUNTER — NURSE TRIAGE (OUTPATIENT)
Dept: ADMINISTRATIVE | Facility: CLINIC | Age: 84
End: 2019-03-16

## 2019-03-16 NOTE — TELEPHONE ENCOUNTER
Patient called to report the following:     -problems with pain in knee and hip  -am I hurting my knee by putting OTC lidocaine patches on my knee  -pt advised on medication per eloy anaya and advised when to call back     Reason for Disposition   Caller has medication question only, adult not sick, and triager answers question    Protocols used: MEDICATION QUESTION CALL-A-AH

## 2019-03-26 ENCOUNTER — OFFICE VISIT (OUTPATIENT)
Dept: ORTHOPEDICS | Facility: CLINIC | Age: 84
End: 2019-03-26
Payer: MEDICARE

## 2019-03-26 VITALS
SYSTOLIC BLOOD PRESSURE: 144 MMHG | WEIGHT: 145 LBS | HEART RATE: 59 BPM | DIASTOLIC BLOOD PRESSURE: 56 MMHG | BODY MASS INDEX: 25.69 KG/M2 | HEIGHT: 63 IN

## 2019-03-26 DIAGNOSIS — M18.11 ARTHRITIS OF CARPOMETACARPAL (CMC) JOINT OF RIGHT THUMB: ICD-10-CM

## 2019-03-26 DIAGNOSIS — G89.29 CHRONIC RIGHT SHOULDER PAIN: Primary | ICD-10-CM

## 2019-03-26 DIAGNOSIS — M25.511 CHRONIC RIGHT SHOULDER PAIN: Primary | ICD-10-CM

## 2019-03-26 PROCEDURE — 99213 PR OFFICE/OUTPT VISIT, EST, LEVL III, 20-29 MIN: ICD-10-PCS | Mod: S$PBB,,, | Performed by: ORTHOPAEDIC SURGERY

## 2019-03-26 PROCEDURE — 99213 OFFICE O/P EST LOW 20 MIN: CPT | Mod: S$PBB,,, | Performed by: ORTHOPAEDIC SURGERY

## 2019-03-26 PROCEDURE — 99999 PR PBB SHADOW E&M-EST. PATIENT-LVL III: CPT | Mod: PBBFAC,,, | Performed by: ORTHOPAEDIC SURGERY

## 2019-03-26 PROCEDURE — 99213 OFFICE O/P EST LOW 20 MIN: CPT | Mod: PBBFAC | Performed by: ORTHOPAEDIC SURGERY

## 2019-03-26 PROCEDURE — 99999 PR PBB SHADOW E&M-EST. PATIENT-LVL III: ICD-10-PCS | Mod: PBBFAC,,, | Performed by: ORTHOPAEDIC SURGERY

## 2019-03-26 NOTE — PROGRESS NOTES
I have personally taken the history and examined this patient. I agree with the resident's note as stated above.  Pt has been doing well- shoulder injection helped.   Has paraffin but has not used it yet. Pt has used brace metagrip but is rubbed her thumb.     Thumb motion improved, shoulder motion improved.  Pt also uses compound cream and it works well

## 2019-03-27 DIAGNOSIS — E78.5 HYPERLIPIDEMIA: ICD-10-CM

## 2019-03-27 RX ORDER — PRAVASTATIN SODIUM 40 MG/1
TABLET ORAL
Qty: 90 TABLET | Refills: 3 | Status: SHIPPED | OUTPATIENT
Start: 2019-03-27 | End: 2020-02-21

## 2019-03-28 DIAGNOSIS — I10 HYPERTENSION, ESSENTIAL: ICD-10-CM

## 2019-03-28 RX ORDER — SPIRONOLACTONE 50 MG/1
TABLET, FILM COATED ORAL
Qty: 30 TABLET | Refills: 7 | Status: SHIPPED | OUTPATIENT
Start: 2019-03-28 | End: 2019-08-19

## 2019-04-03 ENCOUNTER — OFFICE VISIT (OUTPATIENT)
Dept: DERMATOLOGY | Facility: CLINIC | Age: 84
End: 2019-04-03
Payer: MEDICARE

## 2019-04-03 DIAGNOSIS — D18.00 ANGIOMA: ICD-10-CM

## 2019-04-03 DIAGNOSIS — L82.1 SK (SEBORRHEIC KERATOSIS): ICD-10-CM

## 2019-04-03 DIAGNOSIS — D22.9 NEVUS: ICD-10-CM

## 2019-04-03 DIAGNOSIS — Z85.828 PERSONAL HISTORY OF SKIN CANCER: ICD-10-CM

## 2019-04-03 DIAGNOSIS — L90.5 SCAR: ICD-10-CM

## 2019-04-03 DIAGNOSIS — D48.5 NEOPLASM OF UNCERTAIN BEHAVIOR OF SKIN: Primary | ICD-10-CM

## 2019-04-03 DIAGNOSIS — L57.0 AK (ACTINIC KERATOSIS): ICD-10-CM

## 2019-04-03 DIAGNOSIS — L81.4 LENTIGO: ICD-10-CM

## 2019-04-03 PROCEDURE — 88305 TISSUE EXAM BY PATHOLOGIST: CPT | Performed by: PATHOLOGY

## 2019-04-03 PROCEDURE — 99999 PR PBB SHADOW E&M-EST. PATIENT-LVL II: ICD-10-PCS | Mod: PBBFAC,,, | Performed by: DERMATOLOGY

## 2019-04-03 PROCEDURE — 17003 DESTRUCT PREMALG LES 2-14: CPT | Mod: PBBFAC,PO | Performed by: DERMATOLOGY

## 2019-04-03 PROCEDURE — 11102 TANGNTL BX SKIN SINGLE LES: CPT | Mod: S$PBB,,, | Performed by: DERMATOLOGY

## 2019-04-03 PROCEDURE — 17000 DESTRUCT PREMALG LESION: CPT | Mod: 59,S$PBB,, | Performed by: DERMATOLOGY

## 2019-04-03 PROCEDURE — 99214 PR OFFICE/OUTPT VISIT, EST, LEVL IV, 30-39 MIN: ICD-10-PCS | Mod: 25,S$PBB,, | Performed by: DERMATOLOGY

## 2019-04-03 PROCEDURE — 88305 TISSUE SPECIMEN TO PATHOLOGY, DERMATOLOGY: ICD-10-PCS | Mod: 26,,, | Performed by: PATHOLOGY

## 2019-04-03 PROCEDURE — 99214 OFFICE O/P EST MOD 30 MIN: CPT | Mod: 25,S$PBB,, | Performed by: DERMATOLOGY

## 2019-04-03 PROCEDURE — 17000 PR DESTRUCTION(LASER SURGERY,CRYOSURGERY,CHEMOSURGERY),PREMALIGNANT LESIONS,FIRST LESION: ICD-10-PCS | Mod: 59,S$PBB,, | Performed by: DERMATOLOGY

## 2019-04-03 PROCEDURE — 11102 TANGNTL BX SKIN SINGLE LES: CPT | Mod: 59,PBBFAC,PO | Performed by: DERMATOLOGY

## 2019-04-03 PROCEDURE — 99212 OFFICE O/P EST SF 10 MIN: CPT | Mod: PBBFAC,PO,25 | Performed by: DERMATOLOGY

## 2019-04-03 PROCEDURE — 11102 PR TANGENTIAL BIOPSY, SKIN, SINGLE LESION: ICD-10-PCS | Mod: S$PBB,,, | Performed by: DERMATOLOGY

## 2019-04-03 PROCEDURE — 17000 DESTRUCT PREMALG LESION: CPT | Mod: PBBFAC,PO | Performed by: DERMATOLOGY

## 2019-04-03 PROCEDURE — 17003 DESTRUCT PREMALG LES 2-14: CPT | Mod: S$PBB,,, | Performed by: DERMATOLOGY

## 2019-04-03 PROCEDURE — 99999 PR PBB SHADOW E&M-EST. PATIENT-LVL II: CPT | Mod: PBBFAC,,, | Performed by: DERMATOLOGY

## 2019-04-03 PROCEDURE — 17003 DESTRUCTION, PREMALIGNANT LESIONS; SECOND THROUGH 14 LESIONS: ICD-10-PCS | Mod: S$PBB,,, | Performed by: DERMATOLOGY

## 2019-04-03 NOTE — PROGRESS NOTES
Subjective:       Patient ID:  Gerda Lai is a 86 y.o. female who presents for   Chief Complaint   Patient presents with    Skin Check    Lesion     Pt here today for a TBSE. Pt c/o red colored lesion on left cheek x 1 month. No bleeding, pain or prev tx.  Pt c/o red colored lesion on left nose x 1 year. Tx with cryosyrgery in the past.      Review of Systems   Skin: Positive for daily sunscreen use and activity-related sunscreen use. Negative for tendency to form keloidal scars and recent sunburn.   Hematologic/Lymphatic: Bruises/bleeds easily.        Objective:    Physical Exam   Constitutional: She appears well-developed and well-nourished. No distress.   Neurological: She is alert and oriented to person, place, and time. She is not disoriented.   Psychiatric: She has a normal mood and affect.   Skin:   Areas Examined (abnormalities noted in diagram):   Scalp / Hair Palpated and Inspected  Head / Face Inspection Performed  Neck Inspection Performed  Chest / Axilla Inspection Performed  Abdomen Inspection Performed  Genitals / Buttocks / Groin Inspection Performed  Back Inspection Performed  RUE Inspected  LUE Inspection Performed  RLE Inspected  LLE Inspection Performed  Nails and Digits Inspection Performed                           Diagram Legend     Erythematous scaling macule/papule c/w actinic keratosis       Vascular papule c/w angioma      Pigmented verrucoid papule/plaque c/w seborrheic keratosis      Yellow umbilicated papule c/w sebaceous hyperplasia      Irregularly shaped tan macule c/w lentigo     1-2 mm smooth white papules consistent with Milia      Movable subcutaneous cyst with punctum c/w epidermal inclusion cyst      Subcutaneous movable cyst c/w pilar cyst      Firm pink to brown papule c/w dermatofibroma      Pedunculated fleshy papule(s) c/w skin tag(s)      Evenly pigmented macule c/w junctional nevus     Mildly variegated pigmented, slightly irregular-bordered macule c/w mildly  atypical nevus      Flesh colored to evenly pigmented papule c/w intradermal nevus       Pink pearly papule/plaque c/w basal cell carcinoma      Erythematous hyperkeratotic cursted plaque c/w SCC      Surgical scar with no sign of skin cancer recurrence      Open and closed comedones      Inflammatory papules and pustules      Verrucoid papule consistent consistent with wart     Erythematous eczematous patches and plaques     Dystrophic onycholytic nail with subungual debris c/w onychomycosis     Umbilicated papule    Erythematous-base heme-crusted tan verrucoid plaque consistent with inflamed seborrheic keratosis     Erythematous Silvery Scaling Plaque c/w Psoriasis     See annotation      Assessment / Plan:      Pathology Orders:     Normal Orders This Visit    Tissue Specimen To Pathology, Dermatology     Questions:    Directional Terms:  Other(comment)    Clinical Information:  r/o BCC    Specific Site:  left nasal sidewall        Neoplasm of uncertain behavior of skin  Shave biopsy procedure note:    Shave biopsy performed after verbal consent including risk of infection, scar, recurrence, need for additional treatment of site. Area prepped with alcohol, anesthetized with approximately 1.0cc of 1% lidocaine with epinephrine. Lesional tissue shaved with razor blade. Hemostasis achieved with application of aluminum chloride followed by hyfrecation. No complications. Dressing applied. Wound care explained.    If biopsy positive for malignancy, refer to Dr. Benoit for Mohs surgery consultation.  -     Tissue Specimen To Pathology, Dermatology    AK (actinic keratosis)  Cryosurgery Procedure Note    Verbal consent from the patient is obtained including, but not limited to, risk of hypopigmentation/hyperpigmentation, scar, recurrence of lesion. The patient is aware of the precancerous quality and need for treatment of these lesions. Liquid nitrogen cryosurgery is applied to the 4 actinic keratoses, as detailed in the  physical exam, to produce a freeze injury. The patient is aware that blisters may form and is instructed on wound care with gentle cleansing and use of vaseline ointment to keep moist until healed. The patient is supplied a handout on cryosurgery and is instructed to call if lesions do not completely resolve.    Lentigo  This is a benign hyperpigmented sun induced lesion. Daily sun protection will reduce the number of new lesions. Treatment of these benign lesions are considered cosmetic.  The nature of sun-induced photo-aging and skin cancers is discussed.  Sun avoidance, protective clothing, and the use of 30-SPF sunscreens is advised. Observe closely for skin damage/changes, and call if such occurs.    SK (seborrheic keratosis)  These are benign inherited growths without a malignant potential. Reassurance given to patient. No treatment is necessary.     Nevus  Discussed ABCDE's of nevi.  Monitor for new mole or moles that are becoming bigger, darker, irritated, or developing irregular borders. Brochure provided.    Angioma  These are benign vascular lesions that are inherited.  Treatment is not necessary.    Personal history of skin cancer  Scar  Patient with a history of non melanoma skin cancer.   Total body skin examination performed today including at least 12 points as noted in physical examination. Suspicious lesions noted.             Follow up for prn bx report.

## 2019-04-04 ENCOUNTER — LAB VISIT (OUTPATIENT)
Dept: LAB | Facility: HOSPITAL | Age: 84
End: 2019-04-04
Attending: FAMILY MEDICINE
Payer: MEDICARE

## 2019-04-04 DIAGNOSIS — E78.00 PURE HYPERCHOLESTEROLEMIA: ICD-10-CM

## 2019-04-04 DIAGNOSIS — I10 HYPERTENSION, ESSENTIAL: ICD-10-CM

## 2019-04-04 LAB
ALBUMIN SERPL BCP-MCNC: 4 G/DL (ref 3.5–5.2)
ALP SERPL-CCNC: 61 U/L (ref 55–135)
ALT SERPL W/O P-5'-P-CCNC: 14 U/L (ref 10–44)
ANION GAP SERPL CALC-SCNC: 8 MMOL/L (ref 8–16)
AST SERPL-CCNC: 21 U/L (ref 10–40)
BILIRUB SERPL-MCNC: 0.8 MG/DL (ref 0.1–1)
BUN SERPL-MCNC: 26 MG/DL (ref 8–23)
CALCIUM SERPL-MCNC: 10.1 MG/DL (ref 8.7–10.5)
CHLORIDE SERPL-SCNC: 108 MMOL/L (ref 95–110)
CHOLEST SERPL-MCNC: 184 MG/DL (ref 120–199)
CHOLEST/HDLC SERPL: 3.3 {RATIO} (ref 2–5)
CO2 SERPL-SCNC: 25 MMOL/L (ref 23–29)
CREAT SERPL-MCNC: 0.9 MG/DL (ref 0.5–1.4)
EST. GFR  (AFRICAN AMERICAN): >60 ML/MIN/1.73 M^2
EST. GFR  (NON AFRICAN AMERICAN): 58.1 ML/MIN/1.73 M^2
GLUCOSE SERPL-MCNC: 90 MG/DL (ref 70–110)
HDLC SERPL-MCNC: 55 MG/DL (ref 40–75)
HDLC SERPL: 29.9 % (ref 20–50)
LDLC SERPL CALC-MCNC: 104.6 MG/DL (ref 63–159)
MAGNESIUM SERPL-MCNC: 1.6 MG/DL (ref 1.6–2.6)
NONHDLC SERPL-MCNC: 129 MG/DL
POTASSIUM SERPL-SCNC: 4.4 MMOL/L (ref 3.5–5.1)
PROT SERPL-MCNC: 6.7 G/DL (ref 6–8.4)
SODIUM SERPL-SCNC: 141 MMOL/L (ref 136–145)
TRIGL SERPL-MCNC: 122 MG/DL (ref 30–150)

## 2019-04-04 PROCEDURE — 36415 COLL VENOUS BLD VENIPUNCTURE: CPT | Mod: PO

## 2019-04-04 PROCEDURE — 80061 LIPID PANEL: CPT

## 2019-04-04 PROCEDURE — 80053 COMPREHEN METABOLIC PANEL: CPT

## 2019-04-04 PROCEDURE — 83735 ASSAY OF MAGNESIUM: CPT

## 2019-04-11 ENCOUNTER — OFFICE VISIT (OUTPATIENT)
Dept: CARDIOLOGY | Facility: CLINIC | Age: 84
End: 2019-04-11
Payer: MEDICARE

## 2019-04-11 VITALS
HEART RATE: 60 BPM | WEIGHT: 144.81 LBS | BODY MASS INDEX: 25.66 KG/M2 | HEIGHT: 63 IN | DIASTOLIC BLOOD PRESSURE: 74 MMHG | SYSTOLIC BLOOD PRESSURE: 160 MMHG

## 2019-04-11 DIAGNOSIS — I70.0 AORTIC ATHEROSCLEROSIS: Primary | ICD-10-CM

## 2019-04-11 DIAGNOSIS — E78.00 PURE HYPERCHOLESTEROLEMIA: ICD-10-CM

## 2019-04-11 DIAGNOSIS — N18.30 CKD (CHRONIC KIDNEY DISEASE), STAGE III: ICD-10-CM

## 2019-04-11 DIAGNOSIS — R09.89 BILATERAL CAROTID BRUITS: ICD-10-CM

## 2019-04-11 DIAGNOSIS — I10 HYPERTENSION, ESSENTIAL: ICD-10-CM

## 2019-04-11 PROCEDURE — 99213 OFFICE O/P EST LOW 20 MIN: CPT | Mod: PBBFAC,PO | Performed by: NURSE PRACTITIONER

## 2019-04-11 PROCEDURE — 99214 PR OFFICE/OUTPT VISIT, EST, LEVL IV, 30-39 MIN: ICD-10-PCS | Mod: S$PBB,,, | Performed by: NURSE PRACTITIONER

## 2019-04-11 PROCEDURE — 99999 PR PBB SHADOW E&M-EST. PATIENT-LVL III: CPT | Mod: PBBFAC,,, | Performed by: NURSE PRACTITIONER

## 2019-04-11 PROCEDURE — 99999 PR PBB SHADOW E&M-EST. PATIENT-LVL III: ICD-10-PCS | Mod: PBBFAC,,, | Performed by: NURSE PRACTITIONER

## 2019-04-11 PROCEDURE — 99214 OFFICE O/P EST MOD 30 MIN: CPT | Mod: S$PBB,,, | Performed by: NURSE PRACTITIONER

## 2019-04-11 RX ORDER — IPRATROPIUM BROMIDE 21 UG/1
2 SPRAY, METERED NASAL 2 TIMES DAILY PRN
COMMUNITY
End: 2020-12-02 | Stop reason: ALTCHOICE

## 2019-04-11 NOTE — PROGRESS NOTES
Ms. Lai is a patient of Dr. Camarena and was last seen in [facility] 2018.      Subjective:   Patient ID:  Gerda Lai is a 86 y.o. female who presents for follow-up of Hypertension    Problem List:  Hypertension  - intolerance to multiple antihypertensive meds   HLD  Breast cancer s/p lumpectomy in   Father  in his 50s from MI    HPI:     Ms. Lai is in clinic today for routine follow up.  Patient denies chest pain with exertion or at rest, palpitations, SOB, PLAZA, dizziness, syncope, edema, orthopnea, PND, or claudication.  Reports no routine exercise, but she is very active.  She has been under a lot of stress recently from a difficult tenant.  Her bp has been elevated in the 150-160s for the last few weeks.  She is treated with low dose ASA and low intensity statin.  Patient is taking pravastatin 40 mg and LDL is 105.      Review of Systems   Constitution: Negative for decreased appetite, diaphoresis, malaise/fatigue, weight gain and weight loss.   Eyes: Negative for visual disturbance.   Cardiovascular: Negative for chest pain, claudication, dyspnea on exertion, irregular heartbeat, leg swelling, near-syncope, orthopnea, palpitations, paroxysmal nocturnal dyspnea and syncope.        Denies chest pressure   Respiratory: Negative for cough, hemoptysis, shortness of breath, sleep disturbances due to breathing and snoring.    Endocrine: Negative for cold intolerance and heat intolerance.   Hematologic/Lymphatic: Negative for bleeding problem. Does not bruise/bleed easily.   Musculoskeletal: Negative for myalgias.   Gastrointestinal: Negative for bloating, abdominal pain, anorexia, change in bowel habit, constipation, diarrhea, nausea and vomiting.   Neurological: Negative for difficulty with concentration, disturbances in coordination, excessive daytime sleepiness, dizziness, headaches, light-headedness, loss of balance, numbness and weakness.   Psychiatric/Behavioral: The patient does not  "have insomnia.        Allergies and current medications updated and reviewed:  Review of patient's allergies indicates:   Allergen Reactions    Sulfa (sulfonamide antibiotics) Rash    Clarithromycin Other (See Comments)     Weak, extreme fatigue. dizziness    Flexeril [cyclobenzaprine] Other (See Comments)     Dizziness    Iodine and iodide containing products     Lisinopril Other (See Comments)     Cough and sensation of throat swelling/?angioedema    Losartan Rash    Metoprolol Swelling     Tightness in throat    Spironolactone      Throat tightening    Tramadol Other (See Comments)     Dizzy and weak    Verapamil (bulk) Palpitations    Voltaren [diclofenac sodium] Other (See Comments)     Drops blood pressure     Current Outpatient Medications   Medication Sig    acetaminophen (TYLENOL) 650 MG TbSR Take 650 mg by mouth as needed (pain).     B INFANTIS/B ANI/B JOSE/B BIFID (PROBIOTIC 4X ORAL) Take 1 tablet by mouth daily    coenzyme Q10 (CO Q-10) 100 mg capsule Take 100 mg by mouth once daily.    diclofenac sodium (VOLTAREN) 1 % Gel Apply topically once daily.     glucosamine-chondroitin 500-400 mg tablet Take 1 tablet by mouth once daily.     ipratropium (ATROVENT) 0.03 % nasal spray 2 sprays by Nasal route 2 (two) times daily.    labetalol (NORMODYNE) 100 MG tablet TAKE 1 AND 1/2 TABLETS BY MOUTH TWICE A DAY    LORazepam (ATIVAN) 0.5 MG tablet TAKE ONE-HALF TABLET BY MOUTH TWICE A DAY    multivitamin capsule Take 1 capsule by mouth once daily.    omeprazole (PRILOSEC OTC) 20 MG tablet Take 20 mg by mouth once daily.      pravastatin (PRAVACHOL) 40 MG tablet TAKE 1 TABLET (40 MG TOTAL) BY MOUTH ONCE DAILY.    spironolactone (ALDACTONE) 50 MG tablet TAKE 1 TABLET BY MOUTH EVERY DAY    TEKTURNA 300 mg Tab Take 300 mg by mouth once daily.      No current facility-administered medications for this visit.        Objective:        BP (!) 160/74   Pulse 60   Ht 5' 3" (1.6 m)   Wt 65.7 kg (144 " lb 13.5 oz)   BMI 25.66 kg/m²     Physical Exam   Constitutional: She is oriented to person, place, and time. Vital signs are normal. She appears well-developed and well-nourished. She is active. No distress.   HENT:   Head: Normocephalic and atraumatic.   Eyes: Conjunctivae and lids are normal. No scleral icterus.   Neck: Neck supple. Normal carotid pulses, no hepatojugular reflux and no JVD present. Carotid bruit is present (bilateral, right softer than left).   Cardiovascular: Normal rate, regular rhythm, S1 normal, S2 normal and intact distal pulses. PMI is not displaced. Exam reveals no gallop and no friction rub.   No murmur heard.  Pulses:       Carotid pulses are 2+ on the right side, and 2+ on the left side.       Radial pulses are 2+ on the right side, and 2+ on the left side.        Dorsalis pedis pulses are 2+ on the right side, and 2+ on the left side.        Posterior tibial pulses are 1+ on the right side, and 1+ on the left side.   Pulmonary/Chest: Effort normal and breath sounds normal. No respiratory distress. She has no decreased breath sounds. She has no wheezes. She has no rhonchi. She has no rales. She exhibits no tenderness.   Abdominal: Soft. Normal appearance and bowel sounds are normal. She exhibits no distension, no fluid wave, no abdominal bruit, no ascites and no pulsatile midline mass. There is no hepatosplenomegaly. There is no tenderness.   Musculoskeletal: She exhibits no edema.   Neurological: She is alert and oriented to person, place, and time. Gait normal.   Skin: Skin is warm, dry and intact. No rash noted. She is not diaphoretic. Nails show no clubbing.   Psychiatric: She has a normal mood and affect. Her speech is normal and behavior is normal. Judgment and thought content normal. Cognition and memory are normal.   Nursing note and vitals reviewed.      Chemistry        Component Value Date/Time     04/04/2019 1010    K 4.4 04/04/2019 1010     04/04/2019 1010     CO2 25 04/04/2019 1010    BUN 26 (H) 04/04/2019 1010    CREATININE 0.9 04/04/2019 1010    GLU 90 04/04/2019 1010        Component Value Date/Time    CALCIUM 10.1 04/04/2019 1010    ALKPHOS 61 04/04/2019 1010    AST 21 04/04/2019 1010    ALT 14 04/04/2019 1010    BILITOT 0.8 04/04/2019 1010    ESTGFRAFRICA >60.0 04/04/2019 1010    EGFRNONAA 58.1 (A) 04/04/2019 1010        Lab Results   Component Value Date    HGBA1C 5.5 05/27/2011     Recent Labs   Lab 03/05/18  1224  03/09/18  1510  11/06/18  1530 01/03/19  1008 02/11/19  1647 04/04/19  1010   WHITE BLOOD CELL COUNT 16.90 H   < >  --    < > 9.15 7.44  --   --    HEMOGLOBIN 10.6 L   < >  --    < > 12.5 11.7 L  --   --    HEMATOCRIT 30.6 L   < >  --    < > 37.6 35.1 L  --   --    MCV 98   < >  --    < > 100 H 100 H  --   --    PLATELETS 197   < >  --    < > 229 225  --   --    BNP 60  --  34  --  47  --   --   --    TSH  --   --   --    < >  --  1.672 0.940  --    CHOLESTEROL  --   --   --    < >  --  175  --  184   HDL  --   --   --    < >  --  47  --  55   LDL CHOLESTEROL  --   --   --    < >  --  99.2  --  104.6   TRIGLYCERIDES  --   --   --    < >  --  144  --  122   HDL/CHOLESTEROL RATIO  --   --   --    < >  --  26.9  --  29.9    < > = values in this interval not displayed.              Test(s) Reviewed  I have reviewed the following in detail:  [] Stress test   [] Angiography   [x] Echocardiogram   [x] Labs   [] Other:         Assessment/Plan:   1. Aortic atherosclerosis      2. Pure hypercholesterolemia  . Encouraged mediterranean diet and exercise. Continue pravastatin 40 mg.     3. Hypertension, essential  Home BP at goal <130/80 typically. Request bp log and stress relief.     4. CKD (chronic kidney disease), stage III       Patient was discussed with but not examined by Dr. Camarena    Follow up in about 1 year (around 4/11/2020).

## 2019-04-11 NOTE — PATIENT INSTRUCTIONS
Please check your blood pressure 3-4 times a week and call or email Dr. Camarena in 2 weeks with your readings.     LDL - bad type - improves with diet and medications: typically statins; most other medications that lower LDL have not been proven to prevent heart attacks.  May not improve significantly with exercise alone.  Ideally less than 100 mg/dl. If you have coronary artery disease, this should be well below 70 mg/dl.    HDL - good type - improves with exercise.  Ideally greater than 50 mg/dl.    TGs (triglycerides) - also bad - can change very quickly and considerably with certain foods. Improve with diet and exercise  In some cases a low carbohydrate diet will lower TGs better than a low fat diet.  Ideal range  mg/dl.    Sugar, fat and cholesterol in food:     A sensible diet that limits the intake of sugars, saturated (bad) fats and trans fats while increasing the intake of unsaturated (good)  fats and plant proteins is the basis of the current dietary recommendations.      We now recommend drastically reducing the intake of sugar. There is less emphasis on excluding fat.     Cholesterol in our food is no longer a significant concern, mainly because it is generally present in small amounts. However please do not confuse this with the role of cholesterol in our blood and arteries. Make no mistake, it is cholesterol that clogs up our arteries whether it comes from our food or is manufactured by our bodies.       Most foods that are high in cholesterol are also high in saturated fat. But there is way more saturated fat than cholesterol in these foods. So its the saturated fat content that matters more than cholesterol. On the other hand there are a handful of foods that are high in cholesterol but do not contain much saturated fat: eggs, shrimp, crab legs and crawfish are OK to eat.       Saturated fat is the bad fat - you should limit your intake of this. Deep fried foods, meats and other animal fats are  high in saturated fat. Cookies, donuts and most dessert and cakes are usually high in both saturated fat and sugar.       Unsaturated fat is the good fat. It contains the same number of calories as saturated fat but these fats do not get deposited in our arteries. The Mediterranean style diet encourages the intake of unsaturated fat - olive oil, avocado and unsalted nuts. So instead of baking a piece of fish, it may be better to pan-bah it using olive oil.     You should eat a few servings of vegetables (and fruit as long as you are not diabetic) everyday. Substitute some plant proteins in place of meat: beans, lentils, quinoa and oatmeal. They are lean proteins.     Do not use stick butter or stick margarine. Butter that comes in a tub is soft butter. It consists of 1/2 butter and 1/2 vegetable oil., either canola or olive oil It is fine to use that.       Trans fats should definitely be avoided. Most foods that are labelled as containing 0 gms of trans fat can still contain several hundred milligrams of trans fat: creamer, margarine, refrigerator dough, deep fried foods, ready made frosting, potato, corn and torilla chips, cakes, cookies, pie crusts and crackers containing shortening made with hydrogenated vegetable oil.

## 2019-04-19 DIAGNOSIS — I10 ESSENTIAL HYPERTENSION: ICD-10-CM

## 2019-04-19 RX ORDER — LABETALOL 100 MG/1
TABLET, FILM COATED ORAL
Qty: 135 TABLET | Refills: 1 | Status: SHIPPED | OUTPATIENT
Start: 2019-04-19 | End: 2019-06-05 | Stop reason: SDUPTHER

## 2019-04-24 ENCOUNTER — CLINICAL SUPPORT (OUTPATIENT)
Dept: CARDIOLOGY | Facility: CLINIC | Age: 84
End: 2019-04-24
Attending: NURSE PRACTITIONER
Payer: MEDICARE

## 2019-04-24 DIAGNOSIS — R09.89 BILATERAL CAROTID BRUITS: ICD-10-CM

## 2019-04-24 LAB
LEFT ARM DIASTOLIC BLOOD PRESSURE: 80 MMHG
LEFT ARM SYSTOLIC BLOOD PRESSURE: 200 MMHG
LEFT CBA DIAS: 8 CM/S
LEFT CBA SYS: 149 CM/S
LEFT CCA DIST DIAS: 6 CM/S
LEFT CCA DIST SYS: 66 CM/S
LEFT CCA MID DIAS: 6 CM/S
LEFT CCA MID SYS: 83 CM/S
LEFT CCA PROX DIAS: 6 CM/S
LEFT CCA PROX SYS: 84 CM/S
LEFT ECA DIAS: 10 CM/S
LEFT ECA SYS: 172 CM/S
LEFT ICA DIST DIAS: 18 CM/S
LEFT ICA DIST SYS: 122 CM/S
LEFT ICA MID DIAS: 21 CM/S
LEFT ICA MID SYS: 171 CM/S
LEFT ICA PROX DIAS: 25 CM/S
LEFT ICA PROX SYS: 214 CM/S
LEFT VERTEBRAL DIAS: 8 CM/S
LEFT VERTEBRAL SYS: 50 CM/S
OHS CV CAROTID RIGHT ICA EDV HIGHEST: 20
OHS CV CAROTID ULTRASOUND LEFT ICA/CCA RATIO: 2.55
OHS CV CAROTID ULTRASOUND RIGHT ICA/CCA RATIO: 2.6
OHS CV PV CAROTID LEFT HIGHEST CCA: 84
OHS CV PV CAROTID LEFT HIGHEST ICA: 214
OHS CV PV CAROTID RIGHT HIGHEST CCA: 81
OHS CV PV CAROTID RIGHT HIGHEST ICA: 211
OHS CV US CAROTID LEFT HIGHEST EDV: 25
RIGHT CBA DIAS: 15 CM/S
RIGHT CBA SYS: 181 CM/S
RIGHT CCA DIST DIAS: 7 CM/S
RIGHT CCA DIST SYS: 79 CM/S
RIGHT CCA MID DIAS: 7 CM/S
RIGHT CCA MID SYS: 81 CM/S
RIGHT CCA PROX DIAS: 5 CM/S
RIGHT CCA PROX SYS: 70 CM/S
RIGHT ECA DIAS: 16 CM/S
RIGHT ECA SYS: 244 CM/S
RIGHT ICA DIST DIAS: 11 CM/S
RIGHT ICA DIST SYS: 124 CM/S
RIGHT ICA MID DIAS: 17 CM/S
RIGHT ICA MID SYS: 180 CM/S
RIGHT ICA PROX DIAS: 20 CM/S
RIGHT ICA PROX SYS: 211 CM/S
RIGHT VERTEBRAL DIAS: 17 CM/S
RIGHT VERTEBRAL SYS: 100 CM/S

## 2019-04-24 PROCEDURE — 93880 EXTRACRANIAL BILAT STUDY: CPT | Mod: PBBFAC,PO | Performed by: INTERNAL MEDICINE

## 2019-04-24 PROCEDURE — 93880 CV US DOPPLER CAROTID (CUPID ONLY): ICD-10-PCS | Mod: 26,S$PBB,, | Performed by: INTERNAL MEDICINE

## 2019-04-25 ENCOUNTER — TELEPHONE (OUTPATIENT)
Dept: ORTHOPEDICS | Facility: CLINIC | Age: 84
End: 2019-04-25

## 2019-04-25 ENCOUNTER — PATIENT MESSAGE (OUTPATIENT)
Dept: CARDIOLOGY | Facility: CLINIC | Age: 84
End: 2019-04-25

## 2019-04-25 NOTE — TELEPHONE ENCOUNTER
----- Message from Trena Dc sent at 4/25/2019  8:08 AM CDT -----  Contact: Pt   Can the clinic reply in [facility]: No     Please refill the medication(s) listed below. Please call the patient when the prescription(s) is ready for  at the phone number [phone]    Medication #1: diclofenac sodium (VOLTAREN) 1 % Gel    Medication #2:    Preferred Pharmacy: [facility] in [facility]# [phone]

## 2019-04-29 ENCOUNTER — PROCEDURE VISIT (OUTPATIENT)
Dept: DERMATOLOGY | Facility: CLINIC | Age: 84
End: 2019-04-29
Payer: MEDICARE

## 2019-04-29 VITALS
HEIGHT: 63 IN | DIASTOLIC BLOOD PRESSURE: 67 MMHG | WEIGHT: 144 LBS | BODY MASS INDEX: 25.52 KG/M2 | SYSTOLIC BLOOD PRESSURE: 135 MMHG | HEART RATE: 57 BPM

## 2019-04-29 DIAGNOSIS — C44.321 SQUAMOUS CELL CARCINOMA OF SKIN OF NOSE: Primary | ICD-10-CM

## 2019-04-29 PROCEDURE — 17312 MOHS ADDL STAGE: CPT | Mod: PBBFAC | Performed by: DERMATOLOGY

## 2019-04-29 PROCEDURE — 17312: ICD-10-PCS | Mod: S$PBB,,, | Performed by: DERMATOLOGY

## 2019-04-29 PROCEDURE — 13152 CMPLX RPR E/N/E/L 2.6-7.5 CM: CPT | Mod: PBBFAC | Performed by: DERMATOLOGY

## 2019-04-29 PROCEDURE — 17311 MOHS 1 STAGE H/N/HF/G: CPT | Mod: S$PBB,,, | Performed by: DERMATOLOGY

## 2019-04-29 PROCEDURE — 17311 MOHS 1 STAGE H/N/HF/G: CPT | Mod: PBBFAC | Performed by: DERMATOLOGY

## 2019-04-29 PROCEDURE — 99499 NO LOS: ICD-10-PCS | Mod: S$PBB,,, | Performed by: DERMATOLOGY

## 2019-04-29 PROCEDURE — 17311: ICD-10-PCS | Mod: S$PBB,,, | Performed by: DERMATOLOGY

## 2019-04-29 PROCEDURE — 13152 PR RECMPL WND LID,NOS,EAR 2.6-7.5 CM: ICD-10-PCS | Mod: S$PBB,59,, | Performed by: DERMATOLOGY

## 2019-04-29 PROCEDURE — 13152 CMPLX RPR E/N/E/L 2.6-7.5 CM: CPT | Mod: S$PBB,59,, | Performed by: DERMATOLOGY

## 2019-04-29 PROCEDURE — 99499 UNLISTED E&M SERVICE: CPT | Mod: S$PBB,,, | Performed by: DERMATOLOGY

## 2019-04-29 PROCEDURE — 17312 MOHS ADDL STAGE: CPT | Mod: S$PBB,,, | Performed by: DERMATOLOGY

## 2019-04-29 RX ORDER — HYDROCODONE BITARTRATE AND ACETAMINOPHEN 5; 325 MG/1; MG/1
1 TABLET ORAL EVERY 6 HOURS PRN
Qty: 10 TABLET | Refills: 0 | Status: SHIPPED | OUTPATIENT
Start: 2019-04-29 | End: 2019-05-20

## 2019-04-29 RX ORDER — CEPHALEXIN 500 MG/1
500 CAPSULE ORAL 3 TIMES DAILY
Qty: 21 CAPSULE | Refills: 0 | Status: SHIPPED | OUTPATIENT
Start: 2019-04-29 | End: 2019-05-06

## 2019-04-29 NOTE — PROGRESS NOTES
PROCEDURE: Mohs' Micrographic Surgery    INDICATION: Location in mask areas of face including central face, nose, eyelids, eyebrows, lips, chin, preauricular, temple, and ear. Biopsy-proven skin cancer of cosmetically and functionally important areas, including head, neck, genital, hand, foot, or areas known for having difficulty in healing, such as the lower anterior legs. Tumor with ill-defined borders.    REFERRING MD: Bouchra Curtis M.D.    CASE NUMBER:     ANESTHETIC: 3 cc 0.5% Lidocaine with Epi 1:200,000 mixed 1:1 with 0.5% Bupivacaine    SURGICAL PREP: Hibiclens    SURGEON: Chantale Benoit MD    ASSISTANTS: Nahed Siegel PA-C, Bridget Small MA and Ángela Hoffman, Surg Tech    PREOPERATIVE DIAGNOSIS: squamous cell carcinoma    POSTOPERATIVE DIAGNOSIS: squamous cell carcinoma    PATHOLOGIC DIAGNOSIS: squamous cell carcinoma- superficially invasive, well differentiated    HISTOLOGY OF SPECIMENS IN FIRST STAGE:   Tumor Type: Tumor seen. Superficial squamous cell carcinoma: Proliferation of squamous cells exhibiting atypia and infiltrating within the superficial papillary dermis.  Invasive squamous cell carcinoma: Proliferation of squamous cells exhibiting atypia and infiltrating within the dermis.  Well-differentiated squamous cell carcinoma: Proliferation of squamous cells exhibiting atypia and infiltrating within the dermis.   Depth of Invasion: epidermis and dermis  Perineural Invasion: No    HISTOLOGY OF SPECIMENS IN SUBSEQUENT STAGES:  Tumor Type: Tumor seen. Same as in first stage.   Depth of Invasion: epidermis and dermis  Perineural Invasion: No    STAGES OF MOHS' SURGERY PERFORMED: 3    TUMOR-FREE PLANE ACHIEVED: Yes    HEMOSTASIS: electrocoagulation     SPECIMENS: 5 (2 in stage A, 2 in stage B and 1 in stage C)    LOCATION: left nasal sidewall. Patient verified location with hand held mirror.    INITIAL LESION SIZE: 0.4 x 0.4 cm    FINAL DEFECT SIZE: 0.7 x 1.4 cm    WOUND REPAIR/DISPOSITION: The  "patient tolerated Mohs' Micrographic Surgery for a squamous cell carcinoma very well. When the tumor was completely removed, a repair of the surgical defect was undertaken.      PROCEDURE: Complex Linear Repair    INDICATION: Status post Mohs' Micrographic Surgery for squamous cell carcinoma.    CASE NUMBER:     SURGEON: Chantale Benoit MD    ASSISTANTS: Nahed Siegel PA-C and Bridget Small MA    ANESTHETIC: 2 cc 1% Lidocaine with Epinephrine 1:100,000    SURGICAL PREP: Hibiclens, prepped by Bridget Small MA    LOCATION: left nasal sidewall    DEFECT SIZE: 0.7 x 1.4 cm    WOUND REPAIR/DISPOSITION:  After the patient's carcinoma had been completely removed with Mohs' Micrographic Surgery, a repair of the surgical defect was undertaken. The patient was returned to the operating suite where the area of left nasal sidewall was prepped, draped, and anesthetized in the usual sterile fashion. The wound was widely undermined in all directions. Then, electrocoagulation was used to obtain meticulous hemostasis. 5-0 Vicryl buried vertical mattress sutures were placed into the subcutaneous and dermal plane to close the wound and marisel the cutaneous wound edge. Bilateral dog ears were identified and were removed by a standard Burow's triangle technique. The cutaneous wound edges were closed using interrupted 6-0 Prolene suture.    The patient tolerated the procedure well.    The area was cleaned and dressed appropriately and the patient was given wound care instructions, as well as appointment for follow-up evaluation. Patient was placed on Norco 5-325 mg prn postop pain and Keflex 500 mg TID x 7 days.    LENGTH OF REPAIR: 2.7 cm    Vitals:    04/29/19 1237   BP: 135/67   BP Location: Left arm   Patient Position: Sitting   BP Method: Large (Automatic)   Pulse: (!) 57   Weight: 65.3 kg (144 lb)   Height: 5' 3" (1.6 m)         "

## 2019-04-30 RX ORDER — LORAZEPAM 0.5 MG/1
TABLET ORAL
Qty: 60 TABLET | Refills: 1 | Status: SHIPPED | OUTPATIENT
Start: 2019-04-30 | End: 2019-05-02 | Stop reason: SDUPTHER

## 2019-05-02 RX ORDER — LORAZEPAM 0.5 MG/1
TABLET ORAL
Qty: 60 TABLET | Refills: 1 | Status: SHIPPED | OUTPATIENT
Start: 2019-05-02 | End: 2019-05-09 | Stop reason: SDUPTHER

## 2019-05-03 ENCOUNTER — TELEPHONE (OUTPATIENT)
Dept: ORTHOPEDICS | Facility: CLINIC | Age: 84
End: 2019-05-03

## 2019-05-03 ENCOUNTER — TELEPHONE (OUTPATIENT)
Dept: DERMATOLOGY | Facility: CLINIC | Age: 84
End: 2019-05-03

## 2019-05-03 NOTE — TELEPHONE ENCOUNTER
Dr. Benoit told me to tell the pt to start taking her med as prescribe tomorrow.     ----- Message from Nancy Roach sent at 5/3/2019  4:28 PM CDT -----  Contact: katarina Benoit - pt Needs Advice    Reason for call: pt is calling to speak with the nurse about her medication pt was given  cephALEXin (KEFLEX) 500 MG capsule pt was taking it once a day pt said she didn't read the rest of the directions that said to take the medication three times a day pt said today she has taken two thus far pt said she only has been taking it once a day up until today pt said she is trying to be careful and wanted to see what Dr Benoit would say         Communication Preference: can you please call pt at [phone]    Additional Information: none    NICOLÁS

## 2019-05-03 NOTE — TELEPHONE ENCOUNTER
----- Message from Isabella Goncalves sent at 5/3/2019  9:53 AM CDT -----  Contact: PT  Name of Who is Calling:JOSÉ MIGUEL CASTILLO [[id number]]    What is the request in detail: Patient would like a callback regarding medication Please contact to further discuss and advise    Can the clinic reply by [facility]: No    What Number to Call Back [facility]: [phone]

## 2019-05-06 ENCOUNTER — OFFICE VISIT (OUTPATIENT)
Dept: ORTHOPEDICS | Facility: CLINIC | Age: 84
End: 2019-05-06
Payer: MEDICARE

## 2019-05-06 ENCOUNTER — OFFICE VISIT (OUTPATIENT)
Dept: DERMATOLOGY | Facility: CLINIC | Age: 84
End: 2019-05-06
Payer: MEDICARE

## 2019-05-06 VITALS
SYSTOLIC BLOOD PRESSURE: 146 MMHG | HEART RATE: 61 BPM | DIASTOLIC BLOOD PRESSURE: 53 MMHG | HEIGHT: 63 IN | BODY MASS INDEX: 25.32 KG/M2 | WEIGHT: 142.88 LBS

## 2019-05-06 DIAGNOSIS — M12.811 ROTATOR CUFF ARTHROPATHY OF RIGHT SHOULDER: Primary | ICD-10-CM

## 2019-05-06 DIAGNOSIS — Z09 POSTOP CHECK: Primary | ICD-10-CM

## 2019-05-06 PROCEDURE — 99213 OFFICE O/P EST LOW 20 MIN: CPT | Mod: PBBFAC,27 | Performed by: DERMATOLOGY

## 2019-05-06 PROCEDURE — 99024 POSTOP FOLLOW-UP VISIT: CPT | Mod: POP,,, | Performed by: DERMATOLOGY

## 2019-05-06 PROCEDURE — 99213 OFFICE O/P EST LOW 20 MIN: CPT | Mod: 25,S$PBB,, | Performed by: PHYSICIAN ASSISTANT

## 2019-05-06 PROCEDURE — 20610 PR DRAIN/INJECT LARGE JOINT/BURSA: ICD-10-PCS | Mod: S$PBB,RT,, | Performed by: PHYSICIAN ASSISTANT

## 2019-05-06 PROCEDURE — 99213 OFFICE O/P EST LOW 20 MIN: CPT | Mod: PBBFAC,25 | Performed by: PHYSICIAN ASSISTANT

## 2019-05-06 PROCEDURE — 99999 PR PBB SHADOW E&M-EST. PATIENT-LVL III: CPT | Mod: PBBFAC,,, | Performed by: PHYSICIAN ASSISTANT

## 2019-05-06 PROCEDURE — 20610 DRAIN/INJ JOINT/BURSA W/O US: CPT | Mod: PBBFAC | Performed by: PHYSICIAN ASSISTANT

## 2019-05-06 PROCEDURE — 99024 PR POST-OP FOLLOW-UP VISIT: ICD-10-PCS | Mod: POP,,, | Performed by: DERMATOLOGY

## 2019-05-06 PROCEDURE — 99999 PR PBB SHADOW E&M-EST. PATIENT-LVL III: ICD-10-PCS | Mod: PBBFAC,,, | Performed by: DERMATOLOGY

## 2019-05-06 PROCEDURE — 20610 DRAIN/INJ JOINT/BURSA W/O US: CPT | Mod: S$PBB,RT,, | Performed by: PHYSICIAN ASSISTANT

## 2019-05-06 PROCEDURE — 99213 PR OFFICE/OUTPT VISIT, EST, LEVL III, 20-29 MIN: ICD-10-PCS | Mod: 25,S$PBB,, | Performed by: PHYSICIAN ASSISTANT

## 2019-05-06 PROCEDURE — 99999 PR PBB SHADOW E&M-EST. PATIENT-LVL III: CPT | Mod: PBBFAC,,, | Performed by: DERMATOLOGY

## 2019-05-06 PROCEDURE — 99999 PR PBB SHADOW E&M-EST. PATIENT-LVL III: ICD-10-PCS | Mod: PBBFAC,,, | Performed by: PHYSICIAN ASSISTANT

## 2019-05-06 RX ORDER — METHYLPREDNISOLONE ACETATE 80 MG/ML
80 INJECTION, SUSPENSION INTRA-ARTICULAR; INTRALESIONAL; INTRAMUSCULAR; SOFT TISSUE
Status: COMPLETED | OUTPATIENT
Start: 2019-05-06 | End: 2019-05-06

## 2019-05-06 RX ADMIN — METHYLPREDNISOLONE ACETATE 80 MG: 80 INJECTION, SUSPENSION INTRALESIONAL; INTRAMUSCULAR; INTRASYNOVIAL; SOFT TISSUE at 02:05

## 2019-05-06 NOTE — PROGRESS NOTES
86 y.o. female patient is here for suture removal following Mohs' surgery.    Patient reports no problems with left nasal sidewall.    WOUND PE:  The left nasal sidewall sutures intact. Wound healing well. Good skin edges. No signs or symptoms of infection.    IMPRESSION:  Healing operative site from Mohs' surgery SCC, left nasal sidewall s/p Mohs with CLC, postop day # 7.    PLAN:  Sutures removed today. Steri-strips applied.  Continue wound care.  Keep moist with Aquaphor.    RTC:  In 1 month.

## 2019-05-06 NOTE — PROGRESS NOTES
Subjective:      Patient ID: Gerda Lai is a 86 y.o. female.    Chief Complaint: Pain of the Right Shoulder    HPI  Patient returns regarding her right shoulder. 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 She saw Dr. Quiñonez in January and non-op tx was decided. She received an injection that gave her good relief. She uses voltaren gel which gives some relief. She is requesting repeat injection.   Review of Systems   Constitution: Negative for chills, fever and night sweats.   Cardiovascular: Negative for chest pain.   Respiratory: Negative for cough and shortness of breath.    Hematologic/Lymphatic: Does not bruise/bleed easily.   Skin: Negative for color change.   Gastrointestinal: Negative for heartburn.   Genitourinary: Negative for dysuria.   Neurological: Negative for numbness and paresthesias.   Psychiatric/Behavioral: Negative for altered mental status.   Allergic/Immunologic: Negative for persistent infections.         Objective:            General    Vitals reviewed.  Constitutional: She is oriented to person, place, and time. She appears well-developed and well-nourished.   Cardiovascular: Normal rate.    Neurological: She is alert and oriented to person, place, and time.         Right Shoulder Exam     Range of Motion   Active abduction: normal   Forward Flexion: normal   External Rotation 0 degrees: normal   Internal rotation 0 degrees: normal     Tests & Signs   Speed's Test: positive    Other   Sensation: normal    Comments:  ROM is symmetrical to unaffected side.     Muscle Strength   Right Upper Extremity   Supraspinatus: 4/5/5   Biceps: 4/5/5     Vascular Exam     Right Pulses      Radial:                    2+          X-ray: reviewed by myself. No acute abnormality.  Severe DJD at right shoulder.  Secondary signs of rotator cuff pathology also.        Assessment:       Encounter Diagnosis   Name Primary?    Rotator cuff arthropathy of right shoulder Yes          Plan:       Discussed  treatment options with patient. She would like another cortisone injection. RTC prn.     PROCEDURE:  I have explained the risks, benefits, and alternatives of the procedure in detail.  The patient voices understanding and all questions have been answered.  The patient agrees to proceed as planned. So after I performed a sterile prep of the skin in the normal fashion the right shoulder is injected from the posterior approach using a 22 gauge needle with a combination of 4cc 1% plain lidocaine and 80 mg of depo medrol. The patient is cautioned and immediate relief of pain is secondary to the local anesthetic and will be temporary.  After the anesthetic wears off there may be a increase in pain that may last for a few hours or a few days and they should use ice to help alleviate this flair up of pain.

## 2019-05-10 RX ORDER — LORAZEPAM 0.5 MG/1
TABLET ORAL
Qty: 60 TABLET | Refills: 0 | Status: SHIPPED | OUTPATIENT
Start: 2019-05-10 | End: 2019-07-26 | Stop reason: SDUPTHER

## 2019-05-20 ENCOUNTER — OFFICE VISIT (OUTPATIENT)
Dept: RHEUMATOLOGY | Facility: CLINIC | Age: 84
End: 2019-05-20
Payer: MEDICARE

## 2019-05-20 VITALS
BODY MASS INDEX: 24.84 KG/M2 | WEIGHT: 145.5 LBS | SYSTOLIC BLOOD PRESSURE: 154 MMHG | DIASTOLIC BLOOD PRESSURE: 61 MMHG | HEIGHT: 64 IN | HEART RATE: 57 BPM

## 2019-05-20 DIAGNOSIS — M15.9 PRIMARY OSTEOARTHRITIS INVOLVING MULTIPLE JOINTS: ICD-10-CM

## 2019-05-20 DIAGNOSIS — M85.80 OSTEOPENIA, UNSPECIFIED LOCATION: ICD-10-CM

## 2019-05-20 DIAGNOSIS — M75.101 ROTATOR CUFF TEAR ARTHROPATHY, RIGHT: ICD-10-CM

## 2019-05-20 DIAGNOSIS — M75.102 ROTATOR CUFF TEAR ARTHROPATHY, LEFT: ICD-10-CM

## 2019-05-20 DIAGNOSIS — M17.0 PRIMARY OSTEOARTHRITIS OF BOTH KNEES: ICD-10-CM

## 2019-05-20 DIAGNOSIS — M70.62 TROCHANTERIC BURSITIS, LEFT HIP: ICD-10-CM

## 2019-05-20 DIAGNOSIS — M12.811 ROTATOR CUFF TEAR ARTHROPATHY, RIGHT: ICD-10-CM

## 2019-05-20 DIAGNOSIS — M12.812 ROTATOR CUFF TEAR ARTHROPATHY, LEFT: ICD-10-CM

## 2019-05-20 PROCEDURE — 99999 PR PBB SHADOW E&M-EST. PATIENT-LVL IV: ICD-10-PCS | Mod: PBBFAC,,, | Performed by: INTERNAL MEDICINE

## 2019-05-20 PROCEDURE — 99213 OFFICE O/P EST LOW 20 MIN: CPT | Mod: S$PBB,,, | Performed by: INTERNAL MEDICINE

## 2019-05-20 PROCEDURE — 99214 OFFICE O/P EST MOD 30 MIN: CPT | Mod: PBBFAC | Performed by: INTERNAL MEDICINE

## 2019-05-20 PROCEDURE — 99999 PR PBB SHADOW E&M-EST. PATIENT-LVL IV: CPT | Mod: PBBFAC,,, | Performed by: INTERNAL MEDICINE

## 2019-05-20 PROCEDURE — 99213 PR OFFICE/OUTPT VISIT, EST, LEVL III, 20-29 MIN: ICD-10-PCS | Mod: S$PBB,,, | Performed by: INTERNAL MEDICINE

## 2019-05-20 RX ORDER — ALENDRONATE SODIUM 35 MG/1
35 TABLET ORAL
Qty: 4 TABLET | Refills: 11 | Status: SHIPPED | OUTPATIENT
Start: 2019-05-20 | End: 2020-08-07

## 2019-05-20 ASSESSMENT — ROUTINE ASSESSMENT OF PATIENT INDEX DATA (RAPID3)
TOTAL RAPID3 SCORE: 3.39
PAIN SCORE: 4
FATIGUE SCORE: 4.5
AM STIFFNESS SCORE: 1, YES
WHEN YOU AWAKENED IN THE MORNING OVER THE LAST WEEK, PLEASE INDICATE THE AMOUNT OF TIME IT TAKES UNTIL YOU ARE AS LIMBER AS YOU WILL BE FOR THE DAY: 20 MINS
PSYCHOLOGICAL DISTRESS SCORE: 1.1
PATIENT GLOBAL ASSESSMENT SCORE: 4.5
MDHAQ FUNCTION SCORE: .5

## 2019-05-20 NOTE — PATIENT INSTRUCTIONS
Turmeric 1000mg with Boswellia  Would see dentist, then start alendronate 35mg by mouth once a week    Alendronate: take 8 oz plain water  Wait 1/2 hr before eating or drinking or taking other pills  Have to be upright 1/2 hr after taking

## 2019-05-20 NOTE — ASSESSMENT & PLAN NOTE
Cont PT right shoulder and knees  Acetaminophen 650mg q 6 hrs prn  Glucosamine-chondroitin 1500-1200mg daily  Turmeric-boswellia  RTC  6 months/ prn

## 2019-05-20 NOTE — PROGRESS NOTES
"Subjective:       Patient ID: Gerda Lai is a 86 y.o. female.    Chief Complaint: Osteopenia with elevated FRAX;  Gen OA  HPI Hasn't started alendronate yet as we were awaiting labs, which were fine. Has some pain left shoulder and left posterior axillary fold. Unsteady with knees and requests resume PT, ordered. Just had right shoulder injected. Declines left shoulder injection.  Review of Systems   Constitutional: Positive for fatigue. Negative for appetite change, fever and unexpected weight change.   HENT: Negative for mouth sores.         Dry mouth   Eyes: Negative for visual disturbance.   Respiratory: Negative for cough, shortness of breath and wheezing.    Cardiovascular: Negative for chest pain and palpitations.   Gastrointestinal: Negative for abdominal pain, anal bleeding, blood in stool, constipation, diarrhea, nausea and vomiting.   Genitourinary: Negative for dysuria, frequency and urgency.   Musculoskeletal: Negative for arthralgias, back pain, gait problem, joint swelling, myalgias, neck pain and neck stiffness.   Skin: Negative for rash.   Neurological: Negative for weakness, numbness and headaches.   Hematological: Negative for adenopathy. Does not bruise/bleed easily.   Psychiatric/Behavioral: Negative for sleep disturbance. The patient is not nervous/anxious.          Objective:   BP (!) 154/61   Pulse (!) 57   Ht 5' 3.6" (1.615 m)   Wt 66 kg (145 lb 8 oz)   BMI 25.29 kg/m²      Physical Exam       Right Side Rheumatological Exam     Examination finds the elbow, wrist, 1st MCP, 2nd MCP, 3rd MCP, 4th MCP and 5th MCP normal.    The patient is tender to palpation of the shoulder and knee    The patient has an enlarged knee, 1st PIP, 2nd PIP, 3rd PIP, 4th PIP, 5th PIP, 1st CMC, 2nd DIP, 3rd DIP, 4th DIP and 5th DIP    Left Side Rheumatological Exam     Examination finds the elbow, wrist, 1st MCP, 2nd MCP, 3rd MCP, 4th MCP and 5th MCP normal.    The patient is tender to palpation of the " knee.    The patient has an enlarged knee, 1st PIP, 2nd PIP, 3rd PIP, 4th PIP, 5th PIP, 1st CMC, 2nd DIP, 3rd DIP, 4th DIP and 5th DIP.          Results for JOSÉ MIGUEL CASTILLO (MRN [id number]) as of 5/20/2019 10:59   Ref. Range 4/3/2019 15:21 4/4/2019 10:10 4/24/2019 16:27   Sodium Latest Ref Range: 136 - 145 mmol/L  141    Potassium Latest Ref Range: 3.5 - 5.1 mmol/L  4.4    Chloride Latest Ref Range: 95 - 110 mmol/L  108    CO2 Latest Ref Range: 23 - 29 mmol/L  25    Anion Gap Latest Ref Range: 8 - 16 mmol/L  8    BUN, Bld Latest Ref Range: 8 - 23 mg/dL  26 (H)    Creatinine Latest Ref Range: 0.5 - 1.4 mg/dL  0.9    eGFR if non African American Latest Ref Range: >60 mL/min/1.73 m^2  58.1 (A)    eGFR if African American Latest Ref Range: >60 mL/min/1.73 m^2  >60.0    Glucose Latest Ref Range: 70 - 110 mg/dL  90    Calcium Latest Ref Range: 8.7 - 10.5 mg/dL  10.1    Magnesium Latest Ref Range: 1.6 - 2.6 mg/dL  1.6    Alkaline Phosphatase Latest Ref Range: 55 - 135 U/L  61    PROTEIN TOTAL Latest Ref Range: 6.0 - 8.4 g/dL  6.7    Albumin Latest Ref Range: 3.5 - 5.2 g/dL  4.0    BILIRUBIN TOTAL Latest Ref Range: 0.1 - 1.0 mg/dL  0.8    AST Latest Ref Range: 10 - 40 U/L  21    ALT Latest Ref Range: 10 - 44 U/L  14    Triglycerides Latest Ref Range: 30 - 150 mg/dL  122    Cholesterol Latest Ref Range: 120 - 199 mg/dL  184    HDL Latest Ref Range: 40 - 75 mg/dL  55    Hdl/Cholesterol Ratio Latest Ref Range: 20.0 - 50.0 %  29.9    LDL Cholesterol External Latest Ref Range: 63.0 - 159.0 mg/dL  104.6    Non-HDL Cholesterol Latest Units: mg/dL  129    Total Cholesterol/HDL Ratio Latest Ref Range: 2.0 - 5.0   3.3    CV US DOPPLER CAROTID (CUPID ONLY) Unknown   Rpt   TISSUE SPECIMEN TO PATHOLOGY, DERMATOLOGY Unknown Rpt     [facility] CV CAROTID ULTRASOUND LEFT ICA/CCA RATIO Unknown   2.55   [facility] CV CAROTID ULTRASOUND RIGHT ICA/CCA RATIO Unknown   2.60   [facility] CV PV CAROTID LEFT HIGHEST ICA Unknown   214.00     Assessment/Plan         Problem  List Items Addressed This Visit     Osteopenia    Overview     Aretha Snell MD 2/7/2019       Narrative     EXAMINATION:  DEXA BONE DENSITY SPINE HIP    CLINICAL HISTORY:  suspected osteoporosis; Asymptomatic menopausal state85 y/o female with no history of fractures.  She had a hysterectomy at 41 y/o.  She is taking 400 units of Vit D supplements.  She has a history of Breast Ca.  Pt had a right hip replacement.  She exercises daily and does not smoke.    TECHNIQUE:  DXA specification: Printi U53388M.    Bone Mineral Density scanning was performed over the hip and lumbar spine. Review of the images confirms satisfactory positioning and technique.    COMPARISON:  Comparison study done on 04/21/2014. Lumbar spine BMD 1.478 g/cm2 and the T-score 3.9.  The Total Hip BMD 0.882 g/cm2 and the T-score -0.5.    FINDINGS:  Lumbar Spine: Lumbar bone mineral density L1-L4 is 1.502g/cm2, which is a T-score of 4.1. The Z-score is 7.0.    Total Hip: Total hip bone mineral density is 0.883g/cm2.  The T-score is -0.5, and the Z-score is 1.9.    Femoral neck: Bone mineral density is 0.711g/cm2 and the T-score is -1.2 and the Z-score is 1.3 g/cm2.    There is a 12% risk of a major osteoporotic fracture and a 3.1% risk of hip fracture in the next 10 years (FRAX).    Compared with previous DXA, BMD at the lumbar spine has increased by 1.6%, and the BMD at the total hip has remained stable.      Impression       Osteopenia of the femoral neck.  FRAX calculations do not suggest treatment.    RECOMMENDATIONS of [facility]:    1.  Calcium 9874-3088 mg daily and vitamin D 800-1000 units daily, adequate exercise.    2.  Consider x-ray of lumbar spine to evaluate very elevated Z-score of 7 and to rule out fracture at L2 which is more dense than the other vertebral bodies    3.  Repeat BMD in 2 years     Results for JOSÉ MIGUEL CASTILLO (MRN [id number]) as of 5/20/2019 10:59   Ref.  Range 2/11/2019 16:47   TSH Latest Ref Range: 0.400 - 4.000 uIU/mL 0.940   Results for JOSÉ MIGUEL CASTILLO (MRN [id number]) as of 5/20/2019 10:59   Ref. Range 2/11/2019 16:47   PTH Latest Ref Range: 9.0 - 77.0 pg/mL 46.0   Results for JOSÉ MIGUEL CASTILLO (MRN [id number]) as of 5/20/2019 10:59   Ref. Range 2/11/2019 16:47   Vit D, 25-Hydroxy Latest Ref Range: 30 - 96 ng/mL 30   Results for JOSÉ MIGUEL CASTILLO (MRN [id number]) as of 5/20/2019 10:59   Ref. Range 4/4/2019 10:10   Magnesium Latest Ref Range: 1.6 - 2.6 mg/dL 1.6   Results for JOSÉ MIGUEL CASTILLO (MRN [id number]) as of 5/20/2019 10:59   Ref. Range 2/11/2019 16:47   Phosphorus Latest Ref Range: 2.7 - 4.5 mg/dL 4.3      3mo ago  (2/13/19) 3mo ago  (2/13/19)    Urine Volume mL 650  650    Urine Collection Duration Hr 24  24    Calcium, Urine 0.0 - 15.0 mg/dL 2.3     Calcium, 24H Urine 4 - 12 mg/Hr 1Low      CA Urine (mg/Spec) mg/Spec 15     Resulting Agency  [facility]      Pathologist Interpretation HEIDI   Pathologist Interpretation HEIDI   Collected: 02/11/19 1647   Resulting lab: [facility]   Value: REVIEWED   Comment: Electronically reviewed and signed by:   Susi Tijerina MD   Signed on 02/12/19 at 14:07   No monoclonal peaks identified.      02/13/19 1058    Resulting lab: [facility]   Value: REVIEWED   Comment: Electronically reviewed and signed by:   Susi Tijerina MD   Signed on 02/14/19 at 16:10   No monoclonal peaks identified.               Current Assessment & Plan     start alendronate 35mg po once a week after dental check, protocol reviewed.   RTC  6months         Osteoarthritis of knees, bilateral    Current Assessment & Plan     Left knee injected by Bianca Hu 3/4/19         Rotator cuff tear arthropathy, left    Current Assessment & Plan     PT  Declines injection, will call if fails to improve.         Trochanteric bursitis, left hip    Current Assessment & Plan     Injected by Bianca Hu in Ortho 3/4/19          Osteoarthritis    Current Assessment & Plan     Cont PT right shoulder and knees  Acetaminophen 650mg q 6 hrs prn  Glucosamine-chondroitin 1500-1200mg daily  Turmeric-boswellia  RTC  6 months/ prn         Relevant Orders    Ambulatory Referral to Physical/Occupational Therapy    Rotator cuff tear arthropathy, right    Current Assessment & Plan     Injected by Bianca Hu in Ortho 5/6/19

## 2019-05-27 ENCOUNTER — CLINICAL SUPPORT (OUTPATIENT)
Dept: REHABILITATION | Facility: HOSPITAL | Age: 84
End: 2019-05-27
Attending: INTERNAL MEDICINE
Payer: MEDICARE

## 2019-05-27 DIAGNOSIS — R26.89 IMPAIRMENT OF BALANCE: ICD-10-CM

## 2019-05-27 DIAGNOSIS — M25.661 DECREASED RANGE OF MOTION (ROM) OF BOTH KNEES: ICD-10-CM

## 2019-05-27 DIAGNOSIS — M25.652 DECREASED RANGE OF MOTION OF BOTH HIPS: ICD-10-CM

## 2019-05-27 DIAGNOSIS — R29.898 WEAKNESS OF BOTH HIPS: ICD-10-CM

## 2019-05-27 DIAGNOSIS — M25.662 DECREASED RANGE OF MOTION (ROM) OF BOTH KNEES: ICD-10-CM

## 2019-05-27 DIAGNOSIS — M25.651 DECREASED RANGE OF MOTION OF BOTH HIPS: ICD-10-CM

## 2019-05-27 DIAGNOSIS — M62.81 QUADRICEPS WEAKNESS: ICD-10-CM

## 2019-05-27 PROCEDURE — 97161 PT EVAL LOW COMPLEX 20 MIN: CPT | Mod: PO | Performed by: PHYSICAL THERAPIST

## 2019-05-27 PROCEDURE — G8979 MOBILITY GOAL STATUS: HCPCS | Mod: CJ,PO | Performed by: PHYSICAL THERAPIST

## 2019-05-27 PROCEDURE — G8978 MOBILITY CURRENT STATUS: HCPCS | Mod: CK,PO | Performed by: PHYSICAL THERAPIST

## 2019-05-27 NOTE — PLAN OF CARE
[facility]  Physical Therapy Initial Evaluation    Name: Gerda Lai  Clinic Number: [id number]    Therapy Diagnosis:   Encounter Diagnoses   Name Primary?    Decreased range of motion of both hips     Decreased range of motion (ROM) of both knees     Quadriceps weakness     Weakness of both hips     Impairment of balance      Physician: Cesar Burroughs MD    Physician Orders: PT Eval and Treat shoulder and knee  Medical Diagnosis from Referral:   M15.0 (ICD-10-CM) - Primary osteoarthritis involving multiple joints  Evaluation Date: 5/27/2019  Authorization Period Expiration: 5/19/2020  Plan of Care Expiration: 8/27/2019  Visit # / Visits authorized: 1/ 1    Time In:     1112  Time Out: 1220  Total Billable Time: 72 minutes    Precautions: Standard and Fall    Subjective      Medical History:   Past Medical History:   Diagnosis Date    Arthritis     Basal cell carcinoma 09/2016    right post auricular neck     Breast cancer 1992    right    Cataract     Fibromyalgia     History of measles as a child     Hyperlipidemia     Hypertension     Personal history of colonic polyps     Pneumonia     SCC (squamous cell carcinoma) 2015    R chest    SCC (squamous cell carcinoma) 2016    left medial shoulder    SCC (squamous cell carcinoma) 2017    left knee    Shingles     Squamous cell carcinoma 2015    right forearm    Thyroid disease     Vaginitis        Surgical History:   Gerda Lai  has a past surgical history that includes thyriodectomy; Total hip arthroplasty; tonsillectomy; Adenoidectomy; Cataract extraction w/  intraocular lens implant (Bilateral); Yag  (Left); Breast lumpectomy (Right, 1992); Breast biopsy (Left); Eye surgery; Joint replacement; and Tonsillectomy.    Medications:   Gerda has a current medication list which includes the following prescription(s): acetaminophen, alendronate, b infantis/b ani/b magali/b bifid, coenzyme q10, diclofenac  sodium, glucosamine-chondroitin, ipratropium, labetalol, lorazepam, multivitamin, omeprazole, pravastatin, spironolactone, and tekturna.    Allergies:   Review of patient's allergies indicates:   Allergen Reactions    Sulfa (sulfonamide antibiotics) Rash    Clarithromycin Other (See Comments)     Weak, extreme fatigue. dizziness    Flexeril [cyclobenzaprine] Other (See Comments)     Dizziness    Iodine and iodide containing products     Lisinopril Other (See Comments)     Cough and sensation of throat swelling/?angioedema    Losartan Rash    Metoprolol Swelling     Tightness in throat    Spironolactone      Throat tightening    Tramadol Other (See Comments)     Dizzy and weak    Verapamil (bulk) Palpitations    Voltaren [diclofenac sodium] Other (See Comments)     Drops blood pressure            History of current condition - Gerda reports: that she is experiencing right shoulder pain but her greatest concern is with her balance and fear of falling.   Date of onset: long standing problem ongoing for about seven years. Began to encounter more pain with the knees about two years ago.     Pain:   Current 0/10, worst 0/10, best 0/10   Location: right and left upper legs  Description: Aching  Aggravating Factors: Walking  Easing Factors: rest and sitting down    Current Level of Function:   Personal - patient showers and does not bathe, uses shower chair.   Domestic - changing linens on the bed. Does not do a lot of cooking and cleaning which is self-imposed.   Community - retail shopping  Occupation - NA   Recreation/fitness - patient stretches for about 15 min each day in the AM. Patient  Performs exercises she was taught during her previous therapy encounters.   Prior Level of Function: she feels she is more debilitated now than previously. Felt more stable last year while she was in therapy.     Prior Therapy: yes for the shoulders and knees   Social History: single lives alone in a single family home.    Occupation: retired.   Pts goals: to stay self-sufficient for as long as possible.     Imaging, X-Rays : LEFT KNEE  3/4/2019 FINDINGS:  Bones are well mineralized.  Slight genu valgus bilaterally.  Moderate to severe narrowing of the tibiofemoral joint space bilaterally, particularly the lateral compartment on the right.  Hypertrophic spurring involving the tibiofemoral and patellofemoral joints bilaterally as well as involving the tibial spines.  No fracture or dislocation.  No definite evidence of joint effusion.  The previously described calcification in the soft tissues above the left patella is no longer seen.  LEFTY HIP 3/4/2019 FINDINGS:  Right hip arthroplasty identified.  The position alignment is satisfactory and unchanged as compared to the previous study.  Mild DJD involving the left hip.  No fracture or bone destruction.  Calcification or heterotopic bone formation adjacent to the left greater trochanter identified as before      Objective     Physical Appearance: increased valgus disposition of the  Right knee. Edema not identified.   Trendelenburg Sign : + RLE for a gluteus medius weakness   Posture Alignment: right iliac crest low compared to the left.   Sensation: Light Touch: Intact  Palpation: R- pain lateral joint line, medial femoral condyle, anterior medial joint space; L - pain anteromedial joint space > anterolateral joint space, medial patellofemoral gutter.  Knee  Right   Left  Pain/Dysfunction with Movement    AROM PROM MMT AROM PROM MMT    Flexion 115 125 4-/5 122 132 4-/5    Extension -15 0 3+/5 -20 -3 3+/5      ROM / MMT:                                                   R                         L  Hip flexors                                           95/110  - 3-/5          110 / 120   3+/5  Gluteus medius                                    20/ 45    2+/5           20/45       2+/5  Gluteus santiago                                   5/10     2+/5            5/10        2+/5             Special Tests: Knee   Left Right   Lachman's - instability and - pain - instability and - pain   Valgus at 0 Deg - instability and - pain - instability and - pain   Valgus at 30 Deg - instability and - pain - instability and - pain   Varus at 0 Deg - instability and - pain - instability and - pain   Varus at 30 Deg + instability and - pain + instability and - pain   Deepak's / Grind Test    - pain - pain            Functional Movement:  Squat    Dysfunctional / no pain     Single Leg Stand    Dysfunctional / no pain R - attempts  L- holds 5 sec     Tandem Stand    Dysfunctional / no pain R/L can not hold >10sec     FLEXIBILITY  Hamstrings R 70    L  65  Hip flexors / quads   hip extension  R 5/10 A/P   L 5/10 A/P     Gait: Without AD  Analysis: noted trendelenburg limp RLE       +This Foto should have been a KNEE. Patient wanted to be seen for her knees and balance concerns although referral indicated the shoulder.     CMS Impairment/Limitation/Restriction for FOTO shoulder Survey    Therapist reviewed FOTO scores for Gerda Lai on 5/27/2019.   FOTO documents entered into [employer] - see Media section.    Limitation Score: 45%  Category: Mobility    Current : CK = at least 40% but < 60% impaired, limited or restricted  Goal: CJ = at least 20% but < 40% impaired, limited or restricted  Discharge: CJ = at least 20% but < 40% impaired, limited or restricted       Education provided:   Discussed possible sources of balance challenges related to BLE weakness, decreased neuromuscular response.     Written Home Exercises Provided: no.          Assessment     Gerda is a 86 y.o. female referred to outpatient Physical Therapy with a medical diagnosis of primary osteoarthritis involving multiple joints. Pt presents with clinical findings of gluteus medius and santiago weakness, decreased right hip mobility in all planes, actively, with decreased passive hip motions of the RLE in flexion and decreased extension BLE. She also  demonstrates compromised standing balance associated with decreased hip / ankle strength and mobility. The patient maintains greater concern for her balance and stability than her shoulder pain at this time.    Evaluation has determined a decrease in functional status and subjective and objective deficits that can be addressed by physical therapy intervention. Decreased flexibility and strength limiting normal movement patterns. Decreased participation in functional and activities. Subjective and objective measures are addressed by goals in the plan of care. Patient/family are involved in the development of these goals. Patient/family are educated about current injury and treatment.   Pt prognosis is Good.   Pt will benefit from skilled outpatient Physical Therapy to address the deficits stated above and in the chart below, provide pt/family education, and to maximize pt's level of independence.     Plan of care discussed with patient: Yes  Pt's spiritual, cultural and educational needs considered and patient is agreeable to the plan of care and goals as stated below:     Anticipated Barriers for therapy: none     Medical Necessity is demonstrated by the following  History  Co-morbidities and personal factors that may impact the plan of care Co-morbidities:   depression, history of cancer, prior hip surgery and arthritis, fibromyalgia     Personal Factors:   age     Major    Examination  Body Structures and Functions, activity limitations and participation restrictions that may impact the plan of care Body Regions:   lower extremities    Body Systems:    ROM  strength  balance  gait    Participation Restrictions:   none    Activity limitations:   Learning and applying knowledge  no deficits    General Tasks and Commands  no deficits    Communication  no deficits    Mobility  lifting and carrying objects  walking    Self care  washing oneself (bathing, drying, washing hands)    Domestic Life  shopping  doing house  work (cleaning house, washing dishes, laundry)    Interactions/Relationships  no deficits    Life Areas  no deficits    Community and Social Life  no deficits         Moderate    Clinical Presentation stable and uncomplicated low   Decision Making/ Complexity Score: low         Short Term Goals:  6 weeks  1. Patient to demonstrate full passive knee extension to 0 degrees LLE to enhance standing balance and stability  2. Patient to demonstrate increased passive right hip flexion to 105 degrees to improve standing stability   3. Patient to demonstrate increased hip flexor and quad flexibility BLE with increased passive hip extension to facilitate improved standing stability   4. Patient will appreciate elimination of knee pain to palpation BLE  5. Indep with HEP    Long Term Goals: 12 weeks  1. Pt will demonstrate increased right hip flexion AROM by 10 degrees or greater to improve walking and standing stability  2. Pt will demonstrate increased BLE knee extension AROM to -10 degrees or less.   3. Pt will demonstrate increased quad strength to 4-/5 or greater to enhance stability   4. Pt will demonstrate increased gluteal strength strength to 3-/5 or greater to enhance standing stability and reduce fall risk.   5  Demonstrate weight bearing w/o Trendelenburg RLE   6. Restore neuromuscular response to unilateral stability          Plan     Plan of care Certification: 5/27/2019 to 8/27/2019.    Outpatient Physical Therapy 2 times weekly for 12 weeks to include the following interventions: Manual Therapy, Neuromuscular Re-ed, Patient Education and Therapeutic Exercise.     Albert Mills, PT

## 2019-05-27 NOTE — PROGRESS NOTES
[facility]  Physical Therapy Initial Evaluation    Name: Gerda Lai  Clinic Number: [id number]    Therapy Diagnosis:   Encounter Diagnoses   Name Primary?    Decreased range of motion of both hips     Decreased range of motion (ROM) of both knees     Quadriceps weakness     Weakness of both hips     Impairment of balance      Physician: Cesar Burroughs MD    Physician Orders: PT Eval and Treat shoulder and knee  Medical Diagnosis from Referral:   M15.0 (ICD-10-CM) - Primary osteoarthritis involving multiple joints  Evaluation Date: 5/27/2019  Authorization Period Expiration: 5/19/2020  Plan of Care Expiration: 8/27/2019  Visit # / Visits authorized: 1/ 1    Time In:     1112  Time Out: 1220  Total Billable Time: 72 minutes    Precautions: Standard and Fall    Subjective      Medical History:   Past Medical History:   Diagnosis Date    Arthritis     Basal cell carcinoma 09/2016    right post auricular neck     Breast cancer 1992    right    Cataract     Fibromyalgia     History of measles as a child     Hyperlipidemia     Hypertension     Personal history of colonic polyps     Pneumonia     SCC (squamous cell carcinoma) 2015    R chest    SCC (squamous cell carcinoma) 2016    left medial shoulder    SCC (squamous cell carcinoma) 2017    left knee    Shingles     Squamous cell carcinoma 2015    right forearm    Thyroid disease     Vaginitis        Surgical History:   Gerda Lai  has a past surgical history that includes thyriodectomy; Total hip arthroplasty; tonsillectomy; Adenoidectomy; Cataract extraction w/  intraocular lens implant (Bilateral); Yag  (Left); Breast lumpectomy (Right, 1992); Breast biopsy (Left); Eye surgery; Joint replacement; and Tonsillectomy.    Medications:   Gerda has a current medication list which includes the following prescription(s): acetaminophen, alendronate, b infantis/b ani/b magali/b bifid, coenzyme q10,  diclofenac sodium, glucosamine-chondroitin, ipratropium, labetalol, lorazepam, multivitamin, omeprazole, pravastatin, spironolactone, and tekturna.    Allergies:   Review of patient's allergies indicates:   Allergen Reactions    Sulfa (sulfonamide antibiotics) Rash    Clarithromycin Other (See Comments)     Weak, extreme fatigue. dizziness    Flexeril [cyclobenzaprine] Other (See Comments)     Dizziness    Iodine and iodide containing products     Lisinopril Other (See Comments)     Cough and sensation of throat swelling/?angioedema    Losartan Rash    Metoprolol Swelling     Tightness in throat    Spironolactone      Throat tightening    Tramadol Other (See Comments)     Dizzy and weak    Verapamil (bulk) Palpitations    Voltaren [diclofenac sodium] Other (See Comments)     Drops blood pressure            History of current condition - Gerda reports: that she is experiencing right shoulder pain but her greatest concern is with her balance and fear of falling.   Date of onset: long standing problem ongoing for about seven years. Began to encounter more pain with the knees about two years ago.     Pain:   Current 0/10, worst 0/10, best 0/10   Location: right and left upper legs  Description: Aching  Aggravating Factors: Walking  Easing Factors: rest and sitting down    Current Level of Function:   Personal - patient showers and does not bathe, uses shower chair.   Domestic - changing linens on the bed. Does not do a lot of cooking and cleaning which is self-imposed.   Community - retail shopping  Occupation - NA   Recreation/fitness - patient stretches for about 15 min each day in the AM. Patient  Performs exercises she was taught during her previous therapy encounters.   Prior Level of Function: she feels she is more debilitated now than previously. Felt more stable last year while she was in therapy.     Prior Therapy: yes for the shoulders and knees   Social History: single lives alone in a single  family home.   Occupation: retired.   Pts goals: to stay self-sufficient for as long as possible.     Imaging, X-Rays : LEFT KNEE  3/4/2019 FINDINGS:  Bones are well mineralized.  Slight genu valgus bilaterally.  Moderate to severe narrowing of the tibiofemoral joint space bilaterally, particularly the lateral compartment on the right.  Hypertrophic spurring involving the tibiofemoral and patellofemoral joints bilaterally as well as involving the tibial spines.  No fracture or dislocation.  No definite evidence of joint effusion.  The previously described calcification in the soft tissues above the left patella is no longer seen.  LEFTY HIP 3/4/2019 FINDINGS:  Right hip arthroplasty identified.  The position alignment is satisfactory and unchanged as compared to the previous study.  Mild DJD involving the left hip.  No fracture or bone destruction.  Calcification or heterotopic bone formation adjacent to the left greater trochanter identified as before      Objective     Physical Appearance: increased valgus disposition of the  Right knee. Edema not identified.   Trendelenburg Sign : + RLE for a gluteus medius weakness   Posture Alignment: right iliac crest low compared to the left.   Sensation: Light Touch: Intact  Palpation: R- pain lateral joint line, medial femoral condyle, anterior medial joint space; L - pain anteromedial joint space > anterolateral joint space, medial patellofemoral gutter.  Knee  Right   Left  Pain/Dysfunction with Movement    AROM PROM MMT AROM PROM MMT    Flexion 115 125 4-/5 122 132 4-/5    Extension -15 0 3+/5 -20 -3 3+/5      ROM / MMT:                                                   R                         L  Hip flexors                                           95/110  - 3-/5          110 / 120   3+/5  Gluteus medius                                    20/ 45    2+/5           20/45       2+/5  Gluteus santiago                                   5/10     2+/5            5/10         2+/5            Special Tests: Knee   Left Right   Lachman's - instability and - pain - instability and - pain   Valgus at 0 Deg - instability and - pain - instability and - pain   Valgus at 30 Deg - instability and - pain - instability and - pain   Varus at 0 Deg - instability and - pain - instability and - pain   Varus at 30 Deg + instability and - pain + instability and - pain   Deepak's / Grind Test    - pain - pain            Functional Movement:  Squat    Dysfunctional / no pain     Single Leg Stand    Dysfunctional / no pain R - attempts  L- holds 5 sec     Tandem Stand    Dysfunctional / no pain R/L can not hold >10sec     FLEXIBILITY  Hamstrings R 70    L  65  Hip flexors / quads   hip extension  R 5/10 A/P   L 5/10 A/P     Gait: Without AD  Analysis: noted trendelenburg limp RLE       +This Foto should have been a KNEE. Patient wanted to be seen for her knees and balance concerns although referral indicated the shoulder.     CMS Impairment/Limitation/Restriction for FOTO shoulder Survey    Therapist reviewed FOTO scores for Gerda Lai on 5/27/2019.   FOTO documents entered into [employer] - see Media section.    Limitation Score: 45%  Category: Mobility    Current : CK = at least 40% but < 60% impaired, limited or restricted  Goal: CJ = at least 20% but < 40% impaired, limited or restricted  Discharge: CJ = at least 20% but < 40% impaired, limited or restricted       Education provided:   Discussed possible sources of balance challenges related to BLE weakness, decreased neuromuscular response.     Written Home Exercises Provided: no.          Assessment     Gerda is a 86 y.o. female referred to outpatient Physical Therapy with a medical diagnosis of primary osteoarthritis involving multiple joints. Pt presents with clinical findings of gluteus medius and santiago weakness, decreased right hip mobility in all planes, actively, with decreased passive hip motions of the RLE in flexion and decreased extension  BLE. She also demonstrates compromised standing balance associated with decreased hip / ankle strength and mobility. The patient maintains greater concern for her balance and stability than her shoulder pain at this time.    Evaluation has determined a decrease in functional status and subjective and objective deficits that can be addressed by physical therapy intervention. Decreased flexibility and strength limiting normal movement patterns. Decreased participation in functional and activities. Subjective and objective measures are addressed by goals in the plan of care. Patient/family are involved in the development of these goals. Patient/family are educated about current injury and treatment.   Pt prognosis is Good.   Pt will benefit from skilled outpatient Physical Therapy to address the deficits stated above and in the chart below, provide pt/family education, and to maximize pt's level of independence.     Plan of care discussed with patient: Yes  Pt's spiritual, cultural and educational needs considered and patient is agreeable to the plan of care and goals as stated below:     Anticipated Barriers for therapy: none     Medical Necessity is demonstrated by the following  History  Co-morbidities and personal factors that may impact the plan of care Co-morbidities:   depression, history of cancer, prior hip surgery and arthritis, fibromyalgia     Personal Factors:   age     Major    Examination  Body Structures and Functions, activity limitations and participation restrictions that may impact the plan of care Body Regions:   lower extremities    Body Systems:    ROM  strength  balance  gait    Participation Restrictions:   none    Activity limitations:   Learning and applying knowledge  no deficits    General Tasks and Commands  no deficits    Communication  no deficits    Mobility  lifting and carrying objects  walking    Self care  washing oneself (bathing, drying, washing hands)    Domestic  Life  shopping  doing house work (cleaning house, washing dishes, laundry)    Interactions/Relationships  no deficits    Life Areas  no deficits    Community and Social Life  no deficits         Moderate    Clinical Presentation stable and uncomplicated low   Decision Making/ Complexity Score: low         Short Term Goals:  6 weeks  1. Patient to demonstrate full passive knee extension to 0 degrees LLE to enhance standing balance and stability  2. Patient to demonstrate increased passive right hip flexion to 105 degrees to improve standing stability   3. Patient to demonstrate increased hip flexor and quad flexibility BLE with increased passive hip extension to facilitate improved standing stability   4. Patient will appreciate elimination of knee pain to palpation BLE  5. Indep with HEP    Long Term Goals: 12 weeks  1. Pt will demonstrate increased right hip flexion AROM by 10 degrees or greater to improve walking and standing stability  2. Pt will demonstrate increased BLE knee extension AROM to -10 degrees or less.   3. Pt will demonstrate increased quad strength to 4-/5 or greater to enhance stability   4. Pt will demonstrate increased gluteal strength strength to 3-/5 or greater to enhance standing stability and reduce fall risk.   5  Demonstrate weight bearing w/o Trendelenburg RLE   6. Restore neuromuscular response to unilateral stability          Plan     Plan of care Certification: 5/27/2019 to 8/27/2019.    Outpatient Physical Therapy 2 times weekly for 12 weeks to include the following interventions: Manual Therapy, Neuromuscular Re-ed, Patient Education and Therapeutic Exercise.     Albert Mills, PT

## 2019-05-29 ENCOUNTER — CLINICAL SUPPORT (OUTPATIENT)
Dept: REHABILITATION | Facility: HOSPITAL | Age: 84
End: 2019-05-29
Attending: INTERNAL MEDICINE
Payer: MEDICARE

## 2019-05-29 DIAGNOSIS — M25.651 DECREASED RANGE OF MOTION OF BOTH HIPS: ICD-10-CM

## 2019-05-29 DIAGNOSIS — R26.89 IMPAIRMENT OF BALANCE: ICD-10-CM

## 2019-05-29 DIAGNOSIS — M62.81 QUADRICEPS WEAKNESS: ICD-10-CM

## 2019-05-29 DIAGNOSIS — M25.652 DECREASED RANGE OF MOTION OF BOTH HIPS: ICD-10-CM

## 2019-05-29 DIAGNOSIS — M25.662 DECREASED RANGE OF MOTION (ROM) OF BOTH KNEES: Primary | ICD-10-CM

## 2019-05-29 DIAGNOSIS — R29.898 WEAKNESS OF BOTH HIPS: ICD-10-CM

## 2019-05-29 DIAGNOSIS — M25.661 DECREASED RANGE OF MOTION (ROM) OF BOTH KNEES: Primary | ICD-10-CM

## 2019-05-29 PROCEDURE — 97110 THERAPEUTIC EXERCISES: CPT | Mod: PO | Performed by: PHYSICAL THERAPIST

## 2019-05-29 NOTE — PROGRESS NOTES
Physical Therapy Daily Treatment Note     Name: Gerda Lai  Clinic Number: [id number]    Therapy Diagnosis:   Encounter Diagnoses   Name Primary?   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 .  She was not issued a home exercise program.  Response to previous treatment: no lingering effects   Functional change: no     Pain: 0/10  Location: bilateral lower legs and upper legs     Objective       DIOP Assessment    1. Sitting to Standing   3 - able to stand independely using hands  2. Standing Unsupported   4 - able to stand safely 2 minutes without hold  3. Sitting Unsupported   4 - able to sit safely and securely 2 minutes  4. Standing to Sitting   3 - controls descent by using hands  5. Pivot Transfer   4 - able to trnasfer safely with minor use of hands  6. Standing with Eyes Closed   4 - albe to stand 10 seconds safely  7. Standing with Feet Together   4 - able to place feet together independently and stand 1 minute safely  8. Reaching Forward with Outstretched Arm   3 - can reach forward 12 cm/5 inches safely  9. Retrieving Object from Floor   4 - able to  slipper safely and easily  10. Turning to Look Behind   4 - looks behind from both sides and weights shifts well  11. Turning 360 Degrees   2  - able to turn 360 safely but slowly  12. Placing Alternate Foot on Step   3 - able to stand independently and completely 8 steps > 20 seconds  13. Standing with One Foot in Front   3 - able to place foot ahead of other independently and hold 30 seconds  14. Standing on One Foot   2 - able to lift leg indepentdently and hold = or < 3 seconds  Total: 47  Maximum: 56    Tinetti Gait and Balance Assessment     Assistive Device  Normally Used: No  Assistive Device During Testing: No]       Balance Tests  1. Sitting Balance:          Steady; safe = 1  2. Arises :        Able, uses arms to help = 1  3. Attempts to arise :        Able to arise, 1 attempt = 2  4. Immediate standing Balance :        Steady without walker or other support = 2  5. Standing balance:         Narrow stance without support = 2  6. Nudged :        Steady = 2  7. Eyes closed:         Steady = 1  8. Turning 360 degrees:         Continuous = 1 and Steady = 1  9. Sitting Down :         Uses arms or not a smooth motion = 1    BALANCE SCORE (out of 16) = 14    Gait Tests:  10. Initiation of Gait:         No hesitancy = 1  11. Step length and height :        Right swing foot:  Passes left stance foot = 1 and Completely clears floor with step = 1         Left swing foot:  Passes right stance foot = 1 and Completely clears floor with step = 1  12. Step symmetry         Right & Left step length appear equal = 1  13. Step continuity :        Steps appear continuous = 1  14. Path :        Straight without walking aid = 2  15. Trunk :          No sway, no flexion, no use of arms, and no use of walking aid = 2  16. Walking stance width          Heels amost touching while walking = 1    Gait Score:    12/12  Balance + Gait Score: 26/28      Gerda received therapeutic exercises to develop strength, flexibility and balance for 70 minutes including:        Leg press   Recumbent bike  Upright bike   UE ergometer  Treadmill   Elliptical       THERAPEUTIC  "EXERCISE  SUPINE  -SLR x 15  -posterior hip stretch hold 10" x 12   -knee to chest     -bridging   -jyotsna ball / hip abd 5" x 12 OTB     SIDELYING  -clams x20  -hip abduction  x12    PRONE  Hip rotation RLE x12    STANDING.  -squatting     SITTING         MANUAL THERAPY  MODALITY  DIRECT EDUCATION:         Assessment     The patient demonstrated favorable balance outcomes and stability noted during gait. She exhibits hip weakness especially on the right. She is most challenged with unilateral and tandem stability.   Gerda is progressing well towards her goals.   Pt prognosis is Good.   Pt will continue to benefit from skilled outpatient physical therapy to address the deficits listed in the problem list box on initial evaluation, provide pt/family education and to maximize pt's level of independence in the home and community environment.   Pt's spiritual, cultural and educational needs considered and pt agreeable to plan of care and goals.     Anticipated barriers to physical therapy: none    Goals:      Short Term Goals:  6 weeks  1. Patient to demonstrate full passive knee extension to 0 degrees LLE to enhance standing balance and stability  2. Patient to demonstrate increased passive right hip flexion to 105 degrees to improve standing stability   3. Patient to demonstrate increased hip flexor and quad flexibility BLE with increased passive hip extension to facilitate improved standing stability   4. Patient will appreciate elimination of knee pain to palpation BLE  5. Indep with HEP     Long Term Goals: 12 weeks  1. Pt will demonstrate increased right hip flexion AROM by 10 degrees or greater to improve walking and standing stability  2. Pt will demonstrate increased BLE knee extension AROM to -10 degrees or less.   3. Pt will demonstrate increased quad strength to 4-/5 or greater to enhance stability   4. Pt will demonstrate increased gluteal strength strength to 3-/5 or greater to enhance standing stability " and reduce fall risk.   5  Demonstrate weight bearing w/o Trendelenburg RLE   6. Restore neuromuscular response to unilateral stability            Plan      Plan of care Certification: 5/27/2019 to 8/27/2019.     Outpatient Physical Therapy 2 times weekly for 12 weeks to include the following interventions: Manual Therapy, Neuromuscular Re-ed, Patient Education and Therapeutic Exercise.        Albert Mills, PT

## 2019-06-04 ENCOUNTER — CLINICAL SUPPORT (OUTPATIENT)
Dept: REHABILITATION | Facility: HOSPITAL | Age: 84
End: 2019-06-04
Attending: INTERNAL MEDICINE
Payer: MEDICARE

## 2019-06-04 ENCOUNTER — TELEPHONE (OUTPATIENT)
Dept: HEMATOLOGY/ONCOLOGY | Facility: CLINIC | Age: 84
End: 2019-06-04

## 2019-06-04 DIAGNOSIS — M25.662 DECREASED RANGE OF MOTION (ROM) OF BOTH KNEES: ICD-10-CM

## 2019-06-04 DIAGNOSIS — R26.89 IMPAIRMENT OF BALANCE: ICD-10-CM

## 2019-06-04 DIAGNOSIS — M25.651 DECREASED RANGE OF MOTION OF BOTH HIPS: ICD-10-CM

## 2019-06-04 DIAGNOSIS — M62.81 QUADRICEPS WEAKNESS: ICD-10-CM

## 2019-06-04 DIAGNOSIS — M25.652 DECREASED RANGE OF MOTION OF BOTH HIPS: ICD-10-CM

## 2019-06-04 DIAGNOSIS — R29.898 WEAKNESS OF BOTH HIPS: ICD-10-CM

## 2019-06-04 DIAGNOSIS — M25.661 DECREASED RANGE OF MOTION (ROM) OF BOTH KNEES: ICD-10-CM

## 2019-06-04 DIAGNOSIS — I10 ESSENTIAL HYPERTENSION: ICD-10-CM

## 2019-06-04 PROCEDURE — 97110 THERAPEUTIC EXERCISES: CPT | Mod: PO

## 2019-06-04 NOTE — TELEPHONE ENCOUNTER
----- Message from Steven Ospina sent at 6/4/2019 11:48 AM CDT -----  Contact: Patient  Pt would like a call to schedule 1yr f/u with Dr Mcgill.      Contact:: [phone]

## 2019-06-04 NOTE — PROGRESS NOTES
"    Physical Therapy Daily Treatment Note     Name: Gerda Lai  Clinic Number: [id number]    Therapy Diagnosis:   Encounter Diagnoses   Name Primary?   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 Pt stated she doesn't feel like it was her fault that she fell, although she still now wants to focus on improving her strength and balance in hopes that it wont happen again.   She was not issued a home exercise program.  Response to previous treatment: no adverse effects   Functional change: none     Pain: 0/10  Location: bilateral lower legs and upper legs     Objective     Gerda received therapeutic exercises to develop strength, flexibility and balance for 50 minutes including:    Leg press   Recumbent bike  Upright bike   UE ergometer  Treadmill   Elliptical       THERAPEUTIC EXERCISE  SUPINE  -SLR x 15  -posterior hip stretch hold 10" x 12   -knee to chest : 4 x 20" hold  -bridging : 2 x 10  -jyotsna ball / hip abd 5" x 12 OTB     SIDELYING  -clams x20  -hip abduction  x12    PRONE  Hip rotation RLE x12    STANDING.  Mini squats: 2 x 10  Step ups at stairs: x 15 B c/ B UE (cues for decreased UE use)    SITTING       MANUAL THERAPY  MODALITY  DIRECT EDUCATION:         Assessment     Pt " demonstrated a good tolerance to session today. Pt required cues with mini squats to avoid knees translating anteriorly over toes. Pt was relying heavily on her arms with step ups initially and was cued to only use her arms for balance. Pt still required minimal UE assist although was much improved compared to her initial performance. Pt was provided an HEP of above exercises and instructed to complete them at home to maintain her strength. Will continue to progress with LE strength and balance.   Gerda is progressing well towards her goals.   Pt prognosis is Good.   Pt will continue to benefit from skilled outpatient physical therapy to address the deficits listed in the problem list box on initial evaluation, provide pt/family education and to maximize pt's level of independence in the home and community environment.   Pt's spiritual, cultural and educational needs considered and pt agreeable to plan of care and goals.     Anticipated barriers to physical therapy: none    Goals:      Short Term Goals:  6 weeks  1. Patient to demonstrate full passive knee extension to 0 degrees LLE to enhance standing balance and stability  2. Patient to demonstrate increased passive right hip flexion to 105 degrees to improve standing stability   3. Patient to demonstrate increased hip flexor and quad flexibility BLE with increased passive hip extension to facilitate improved standing stability   4. Patient will appreciate elimination of knee pain to palpation BLE  5. Indep with HEP     Long Term Goals: 12 weeks  1. Pt will demonstrate increased right hip flexion AROM by 10 degrees or greater to improve walking and standing stability  2. Pt will demonstrate increased BLE knee extension AROM to -10 degrees or less.   3. Pt will demonstrate increased quad strength to 4-/5 or greater to enhance stability   4. Pt will demonstrate increased gluteal strength strength to 3-/5 or greater to enhance standing stability and reduce fall  risk.   5  Demonstrate weight bearing w/o Trendelenburg RLE   6. Restore neuromuscular response to unilateral stability            Plan      Progress with LE strength and balance       Alley Mcdermott, PTA

## 2019-06-05 ENCOUNTER — OFFICE VISIT (OUTPATIENT)
Dept: DERMATOLOGY | Facility: CLINIC | Age: 84
End: 2019-06-05
Payer: MEDICARE

## 2019-06-05 DIAGNOSIS — C44.321 SQUAMOUS CELL CARCINOMA OF SKIN OF NOSE: Primary | ICD-10-CM

## 2019-06-05 PROCEDURE — 99213 OFFICE O/P EST LOW 20 MIN: CPT | Mod: PBBFAC | Performed by: DERMATOLOGY

## 2019-06-05 PROCEDURE — 99999 PR PBB SHADOW E&M-EST. PATIENT-LVL III: ICD-10-PCS | Mod: PBBFAC,,, | Performed by: DERMATOLOGY

## 2019-06-05 PROCEDURE — 99212 PR OFFICE/OUTPT VISIT, EST, LEVL II, 10-19 MIN: ICD-10-PCS | Mod: S$PBB,,, | Performed by: DERMATOLOGY

## 2019-06-05 PROCEDURE — 99999 PR PBB SHADOW E&M-EST. PATIENT-LVL III: CPT | Mod: PBBFAC,,, | Performed by: DERMATOLOGY

## 2019-06-05 PROCEDURE — 99212 OFFICE O/P EST SF 10 MIN: CPT | Mod: S$PBB,,, | Performed by: DERMATOLOGY

## 2019-06-05 RX ORDER — LABETALOL 100 MG/1
TABLET, FILM COATED ORAL
Qty: 135 TABLET | Refills: 1 | Status: SHIPPED | OUTPATIENT
Start: 2019-06-05 | End: 2019-09-20 | Stop reason: SDUPTHER

## 2019-06-05 NOTE — PROGRESS NOTES
86 y.o. female patient is here for wound check after surgery.    Patient reports no problems.    WOUND PE:  The L nasal sidewall incision is well healed. Mild firmness and erythema of scar. No nodularity.      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     PLAN:    Recommended massage tid.  Reassured patient that redness and firmness will fade with time.  Daily SPF.  Regular skin checks.    RTC:  In 3-6 months with Bouchra Curtis M.D. for skin check or sooner if new concern arises.

## 2019-06-06 ENCOUNTER — CLINICAL SUPPORT (OUTPATIENT)
Dept: REHABILITATION | Facility: HOSPITAL | Age: 84
End: 2019-06-06
Attending: INTERNAL MEDICINE
Payer: MEDICARE

## 2019-06-06 DIAGNOSIS — M25.661 DECREASED RANGE OF MOTION (ROM) OF BOTH KNEES: ICD-10-CM

## 2019-06-06 DIAGNOSIS — M62.81 QUADRICEPS WEAKNESS: ICD-10-CM

## 2019-06-06 DIAGNOSIS — M25.652 DECREASED RANGE OF MOTION OF BOTH HIPS: ICD-10-CM

## 2019-06-06 DIAGNOSIS — M25.662 DECREASED RANGE OF MOTION (ROM) OF BOTH KNEES: ICD-10-CM

## 2019-06-06 DIAGNOSIS — R26.89 IMPAIRMENT OF BALANCE: ICD-10-CM

## 2019-06-06 DIAGNOSIS — R29.898 WEAKNESS OF BOTH HIPS: ICD-10-CM

## 2019-06-06 DIAGNOSIS — M25.651 DECREASED RANGE OF MOTION OF BOTH HIPS: ICD-10-CM

## 2019-06-06 PROCEDURE — 97110 THERAPEUTIC EXERCISES: CPT | Mod: PO

## 2019-06-06 NOTE — PROGRESS NOTES
"    Physical Therapy Daily Treatment Note     Name: Gerda Lai  Clinic Number: [id number]    Therapy Diagnosis:   Encounter Diagnoses   Name Primary?   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 Pt stated she likes the exercises and has been keeping up with them at home.   She was compliant with her home exercise program.  Response to previous treatment: no adverse effects   Functional change: none     Pain: 0/10  Location: bilateral lower legs and upper legs     Objective     Gerda received therapeutic exercises to develop strength, flexibility and balance for 50 minutes including:    Leg press   Recumbent bike  Upright bike : 6'  UE ergometer  Treadmill   Elliptical       THERAPEUTIC EXERCISE  SUPINE  -SLR x 15  -posterior hip stretch hold 10" x 12   -knee to chest : 4 x 20" hold  -bridging : 2 x 10  -jyotsna ball / hip abd 5" x 12 OTB   - figure 4 piriformis stretch: 4 x 20"     SIDELYING  -clams x20  -hip abduction  x12    PRONE  Hip rotation RLE x12    STANDING.  Mini squats: 2 x 10 (cues)  Step ups at stairs: x 15 B c/ B UE (cues for decreased UE use)  Lateral steps along counter c/ OTB: x 5 laps    SITTING       MANUAL THERAPY  MODALITY  DIRECT EDUCATION:         Assessment "     Pt demonstrates a good tolerance to session today. Pt has good carryover with previous exercises performed and with minimal cues required. Addition of upright bike, piriformis stretch and lateral steps were performed today with no adverse effects. Pt with appropriate exercise induced muscular fatigue noted towards completion.   Gerda is progressing well towards her goals.   Pt prognosis is Good.   Pt will continue to benefit from skilled outpatient physical therapy to address the deficits listed in the problem list box on initial evaluation, provide pt/family education and to maximize pt's level of independence in the home and community environment.   Pt's spiritual, cultural and educational needs considered and pt agreeable to plan of care and goals.     Anticipated barriers to physical therapy: none    Goals:      Short Term Goals:  6 weeks  1. Patient to demonstrate full passive knee extension to 0 degrees LLE to enhance standing balance and stability  2. Patient to demonstrate increased passive right hip flexion to 105 degrees to improve standing stability   3. Patient to demonstrate increased hip flexor and quad flexibility BLE with increased passive hip extension to facilitate improved standing stability   4. Patient will appreciate elimination of knee pain to palpation BLE  5. Indep with HEP     Long Term Goals: 12 weeks  1. Pt will demonstrate increased right hip flexion AROM by 10 degrees or greater to improve walking and standing stability  2. Pt will demonstrate increased BLE knee extension AROM to -10 degrees or less.   3. Pt will demonstrate increased quad strength to 4-/5 or greater to enhance stability   4. Pt will demonstrate increased gluteal strength strength to 3-/5 or greater to enhance standing stability and reduce fall risk.   5  Demonstrate weight bearing w/o Trendelenburg RLE   6. Restore neuromuscular response to unilateral stability            Plan      Progress with LE strength and  balance       Alley Mcdermott, PTA

## 2019-06-11 ENCOUNTER — CLINICAL SUPPORT (OUTPATIENT)
Dept: REHABILITATION | Facility: HOSPITAL | Age: 84
End: 2019-06-11
Attending: INTERNAL MEDICINE
Payer: MEDICARE

## 2019-06-11 DIAGNOSIS — M25.652 DECREASED RANGE OF MOTION OF BOTH HIPS: ICD-10-CM

## 2019-06-11 DIAGNOSIS — M25.651 DECREASED RANGE OF MOTION OF BOTH HIPS: ICD-10-CM

## 2019-06-11 DIAGNOSIS — M25.662 DECREASED RANGE OF MOTION (ROM) OF BOTH KNEES: ICD-10-CM

## 2019-06-11 DIAGNOSIS — R29.898 WEAKNESS OF BOTH HIPS: Primary | ICD-10-CM

## 2019-06-11 DIAGNOSIS — M25.661 DECREASED RANGE OF MOTION (ROM) OF BOTH KNEES: ICD-10-CM

## 2019-06-11 DIAGNOSIS — R26.89 IMPAIRMENT OF BALANCE: ICD-10-CM

## 2019-06-11 DIAGNOSIS — M62.81 QUADRICEPS WEAKNESS: ICD-10-CM

## 2019-06-11 PROCEDURE — 97110 THERAPEUTIC EXERCISES: CPT | Mod: PO | Performed by: PHYSICAL THERAPIST

## 2019-06-11 NOTE — PROGRESS NOTES
"    Physical Therapy Daily Treatment Note     Name: Gerda Lai  Clinic Number: [id number]    Therapy Diagnosis:   Encounter Diagnoses   Name Primary?   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 She says she  Is not having any pain in the hips. Doing the exercises at home. She says she bathes in [place]en[place].    She was compliant with her home exercise program.  Response to previous treatment: no adverse effects   Functional change: none     Pain: 0/10  Location: bilateral lower legs and upper legs     Objective     Gerda received therapeutic exercises to develop strength, flexibility and balance for 30 minutes including:    Leg press   Recumbent bike  Upright bike : 6'  UE ergometer  Treadmill   Elliptical       THERAPEUTIC EXERCISE  SUPINE   -SLR x 20  -posterior hip stretch hold 10" x 12   -knee to chest : 4 count x12  -bridging : 2 x 10  -jyotsna ball / hip abd 5" x 12 OTB   - figure 4 piriformis stretch: 4 x 20"     SIDELYING  -clams x20 YTB   -hip abduction  x12 with perturbations     PRONE  Hip rotation RLE x12    STANDING.  Mini squats: 2 x 10 (cues)  Step ups at stairs: x 15 B c/ B UE (cues for decreased UE use)  Lateral steps along counter c/ OTB: x 5 laps    SITTING       MANUAL THERAPY  MODALITY  DIRECT EDUCATION:         Assessment "     Patient performed the activities noted. She was challenged with side stepping using the OTB due to increased CV demand.   She otherwise performed the remainder of the activities w/o limitation. Hip flexion limitation due to posterior tightness. Also weakness in the hip flexors persists due to limited mobility.   Gerda is progressing well towards her goals.   Pt prognosis is Good.   Pt will continue to benefit from skilled outpatient physical therapy to address the deficits listed in the problem list box on initial evaluation, provide pt/family education and to maximize pt's level of independence in the home and community environment.   Pt's spiritual, cultural and educational needs considered and pt agreeable to plan of care and goals.     Anticipated barriers to physical therapy: none    Goals:      Short Term Goals:  6 weeks  1. Patient to demonstrate full passive knee extension to 0 degrees LLE to enhance standing balance and stability  2. Patient to demonstrate increased passive right hip flexion to 105 degrees to improve standing stability   3. Patient to demonstrate increased hip flexor and quad flexibility BLE with increased passive hip extension to facilitate improved standing stability   4. Patient will appreciate elimination of knee pain to palpation BLE  5. Indep with HEP     Long Term Goals: 12 weeks  1. Pt will demonstrate increased right hip flexion AROM by 10 degrees or greater to improve walking and standing stability  2. Pt will demonstrate increased BLE knee extension AROM to -10 degrees or less.   3. Pt will demonstrate increased quad strength to 4-/5 or greater to enhance stability   4. Pt will demonstrate increased gluteal strength strength to 3-/5 or greater to enhance standing stability and reduce fall risk.   5  Demonstrate weight bearing w/o Trendelenburg RLE   6. Restore neuromuscular response to unilateral stability            Plan      Progress with LE strength and balance       Don  Gene, PT

## 2019-06-13 ENCOUNTER — OFFICE VISIT (OUTPATIENT)
Dept: CARDIOLOGY | Facility: CLINIC | Age: 84
End: 2019-06-13
Payer: MEDICARE

## 2019-06-13 VITALS
SYSTOLIC BLOOD PRESSURE: 136 MMHG | BODY MASS INDEX: 24.84 KG/M2 | DIASTOLIC BLOOD PRESSURE: 64 MMHG | HEIGHT: 64 IN | HEART RATE: 57 BPM | OXYGEN SATURATION: 97 % | WEIGHT: 145.5 LBS

## 2019-06-13 DIAGNOSIS — N18.30 CKD (CHRONIC KIDNEY DISEASE), STAGE III: ICD-10-CM

## 2019-06-13 DIAGNOSIS — R42 VERTIGO: Primary | ICD-10-CM

## 2019-06-13 DIAGNOSIS — I10 HYPERTENSION, ESSENTIAL: ICD-10-CM

## 2019-06-13 DIAGNOSIS — E78.00 PURE HYPERCHOLESTEROLEMIA: ICD-10-CM

## 2019-06-13 DIAGNOSIS — I65.23 BILATERAL CAROTID ARTERY STENOSIS: ICD-10-CM

## 2019-06-13 PROCEDURE — 99214 OFFICE O/P EST MOD 30 MIN: CPT | Mod: PBBFAC | Performed by: NURSE PRACTITIONER

## 2019-06-13 PROCEDURE — 99214 PR OFFICE/OUTPT VISIT, EST, LEVL IV, 30-39 MIN: ICD-10-PCS | Mod: S$PBB,,, | Performed by: NURSE PRACTITIONER

## 2019-06-13 PROCEDURE — 99999 PR PBB SHADOW E&M-EST. PATIENT-LVL IV: ICD-10-PCS | Mod: PBBFAC,,, | Performed by: NURSE PRACTITIONER

## 2019-06-13 PROCEDURE — 99999 PR PBB SHADOW E&M-EST. PATIENT-LVL IV: CPT | Mod: PBBFAC,,, | Performed by: NURSE PRACTITIONER

## 2019-06-13 PROCEDURE — 99214 OFFICE O/P EST MOD 30 MIN: CPT | Mod: S$PBB,,, | Performed by: NURSE PRACTITIONER

## 2019-06-13 RX ORDER — NIFEDIPINE 60 MG/1
60 TABLET, EXTENDED RELEASE ORAL DAILY
Qty: 30 TABLET | Refills: 11 | Status: SHIPPED | OUTPATIENT
Start: 2019-06-13 | End: 2019-07-25

## 2019-06-13 RX ORDER — DICLOFENAC SODIUM 16.05 MG/ML
SOLUTION TOPICAL
Refills: 3 | COMMUNITY
Start: 2019-04-30 | End: 2019-10-17

## 2019-06-13 NOTE — PROGRESS NOTES
"Ms. Lai is a patient of Dr. Camarena and was last seen in [facility] Cardiology Visit 19    Subjective:   Patient ID:  Gerda Lai is a 86 y.o. female who presents for follow-up of Hypertension and Dizziness (ongoing, mainly mornings)    Problem List:  Hypertension  - intolerance to multiple antihypertensive meds   HLD  Carotid artery disease, 50-59% bilaterally  Breast cancer s/p lumpectomy in   Father  in his 50s from MI    HPI:     Ms. Lai is in clinic today for hypertension follow up.  Her blood pressures have been running 160-180s.  She is taking labetolol 150 mg BID but her HR is in the 50s preventing up titration.  She is on the max dose of the tekturna.  She can not take ACEI or ARBs d/t possible angioedema.  She was on amlodipine 10 mg in  but can not recall why it was stopped.   It was in the record in 2018 and then the amlodipine fell off her list.  She states that the amlodipine made her "feel bad" but can't recall what sx she had.  Reports intermittent dizziness when she lies down that started about 2 weeks ago.  "It feels like something is spinning in my head."    Review of Systems   Constitution: Negative for decreased appetite, diaphoresis, malaise/fatigue, weight gain and weight loss.   Eyes: Negative for visual disturbance.   Cardiovascular: Negative for chest pain, claudication, dyspnea on exertion, irregular heartbeat, leg swelling, near-syncope, orthopnea, palpitations, paroxysmal nocturnal dyspnea and syncope.        Denies chest pressure   Respiratory: Negative for cough, hemoptysis, shortness of breath, sleep disturbances due to breathing and snoring.    Endocrine: Negative for cold intolerance and heat intolerance.   Hematologic/Lymphatic: Negative for bleeding problem. Does not bruise/bleed easily.   Musculoskeletal: Negative for myalgias.   Gastrointestinal: Negative for bloating, abdominal pain, anorexia, change in bowel habit, constipation, diarrhea, nausea and " vomiting.   Neurological: Negative for difficulty with concentration, disturbances in coordination, excessive daytime sleepiness, dizziness, headaches, light-headedness, loss of balance, numbness and weakness.   Psychiatric/Behavioral: The patient does not have insomnia.        Allergies and current medications updated and reviewed:  Review of patient's allergies indicates:   Allergen Reactions    Sulfa (sulfonamide antibiotics) Rash    Clarithromycin Other (See Comments)     Weak, extreme fatigue. dizziness    Flexeril [cyclobenzaprine] Other (See Comments)     Dizziness    Iodine and iodide containing products     Lisinopril Other (See Comments)     Cough and sensation of throat swelling/?angioedema    Losartan Rash    Metoprolol Swelling     Tightness in throat    Tramadol Other (See Comments)     Dizzy and weak    Verapamil (bulk) Palpitations    Voltaren [diclofenac sodium] Other (See Comments)     Drops blood pressure     Current Outpatient Medications   Medication Sig    acetaminophen (TYLENOL) 650 MG TbSR Take 650 mg by mouth as needed (pain).     B INFANTIS/B ANI/B JOSE/B BIFID (PROBIOTIC 4X ORAL) Take 1 tablet by mouth daily    coenzyme Q10 (CO Q-10) 100 mg capsule Take 100 mg by mouth once daily.    diclofenac sodium 1.5 % Drop APPLY 40 DROPS TO EACH PAINFUL AREA (MAXIMUM 2 AREAS) UP TO 4 TIMES DAILY.  DROPS/DAY.    glucosamine-chondroitin 500-400 mg tablet Take 1 tablet by mouth once daily.     ipratropium (ATROVENT) 0.03 % nasal spray 2 sprays by Nasal route 2 (two) times daily.    labetalol (NORMODYNE) 100 MG tablet TAKE 1 AND 1/2 TABLETS BY MOUTH TWICE A DAY    LORazepam (ATIVAN) 0.5 MG tablet TAKE ONE-HALF TABLET BY MOUTH TWICE A DAY    multivitamin capsule Take 1 capsule by mouth once daily.    omeprazole (PRILOSEC OTC) 20 MG tablet Take 20 mg by mouth once daily.      pravastatin (PRAVACHOL) 40 MG tablet TAKE 1 TABLET (40 MG TOTAL) BY MOUTH ONCE DAILY.    spironolactone  "(ALDACTONE) 50 MG tablet TAKE 1 TABLET BY MOUTH EVERY DAY    TEKTURNA 300 mg Tab Take 300 mg by mouth once daily.     alendronate (FOSAMAX) 35 MG tablet Take 1 tablet (35 mg total) by mouth every 7 days.    diclofenac sodium (VOLTAREN) 1 % Gel Apply topically once daily.      No current facility-administered medications for this visit.      Objective:     Left Arm BP - Sittin/64 (19 1604)    /64 (BP Location: Left arm, Patient Position: Sitting, BP Method: Large (Automatic))   Pulse (!) 57   Ht 5' 4" (1.626 m)   Wt 66 kg (145 lb 8.1 oz)   SpO2 97%   BMI 24.98 kg/m²       Physical Exam   Constitutional: She is oriented to person, place, and time. Vital signs are normal. She appears well-developed and well-nourished. She is active. No distress.   HENT:   Head: Normocephalic and atraumatic.   Eyes: Conjunctivae and lids are normal. No scleral icterus.   Neck: Neck supple. Normal carotid pulses, no hepatojugular reflux and no JVD present. Carotid bruit is present (left ).   Cardiovascular: Normal rate, regular rhythm, S1 normal, S2 normal and intact distal pulses. PMI is not displaced. Exam reveals no gallop and no friction rub.   No murmur heard.  Pulses:       Carotid pulses are 2+ on the right side, and 2+ on the left side.       Radial pulses are 2+ on the right side, and 2+ on the left side.        Dorsalis pedis pulses are 2+ on the right side, and 2+ on the left side.        Posterior tibial pulses are 1+ on the right side, and 1+ on the left side.   Pulmonary/Chest: Effort normal and breath sounds normal. No respiratory distress. She has no decreased breath sounds. She has no wheezes. She has no rhonchi. She has no rales. She exhibits no tenderness.   Abdominal: Soft. Normal appearance and bowel sounds are normal. She exhibits no distension, no fluid wave, no abdominal bruit, no ascites and no pulsatile midline mass. There is no hepatosplenomegaly. There is no tenderness. "   Musculoskeletal: She exhibits no edema.   Neurological: She is alert and oriented to person, place, and time. Gait normal.   Skin: Skin is warm, dry and intact. No rash noted. She is not diaphoretic. Nails show no clubbing.   Psychiatric: She has a normal mood and affect. Her speech is normal and behavior is normal. Judgment and thought content normal. Cognition and memory are normal.   Nursing note and vitals reviewed.      Chemistry        Component Value Date/Time     04/04/2019 1010    K 4.4 04/04/2019 1010     04/04/2019 1010    CO2 25 04/04/2019 1010    BUN 26 (H) 04/04/2019 1010    CREATININE 0.9 04/04/2019 1010    GLU 90 04/04/2019 1010        Component Value Date/Time    CALCIUM 10.1 04/04/2019 1010    ALKPHOS 61 04/04/2019 1010    AST 21 04/04/2019 1010    ALT 14 04/04/2019 1010    BILITOT 0.8 04/04/2019 1010    ESTGFRAFRICA >60.0 04/04/2019 1010    EGFRNONAA 58.1 (A) 04/04/2019 1010        Lab Results   Component Value Date    HGBA1C 5.5 05/27/2011     Recent Labs   Lab 03/05/18  1224  03/09/18  1510  11/06/18  1530 01/03/19  1008 02/11/19  1647 04/04/19  1010   WHITE BLOOD CELL COUNT 16.90 H   < >  --    < > 9.15 7.44  --   --    HEMOGLOBIN 10.6 L   < >  --    < > 12.5 11.7 L  --   --    HEMATOCRIT 30.6 L   < >  --    < > 37.6 35.1 L  --   --    MCV 98   < >  --    < > 100 H 100 H  --   --    PLATELETS 197   < >  --    < > 229 225  --   --    BNP 60  --  34  --  47  --   --   --    TSH  --   --   --    < >  --  1.672 0.940  --    CHOLESTEROL  --   --   --    < >  --  175  --  184   HDL  --   --   --    < >  --  47  --  55   LDL CHOLESTEROL  --   --   --    < >  --  99.2  --  104.6   TRIGLYCERIDES  --   --   --    < >  --  144  --  122   HDL/CHOLESTEROL RATIO  --   --   --    < >  --  26.9  --  29.9    < > = values in this interval not displayed.              Test(s) Reviewed  I have reviewed the following in detail:  [] Stress test   [] Angiography   [x] Echocardiogram   [x] Labs   []  Other:         Assessment/Plan:   1. Vertigo    - Ambulatory consult to ENT    2. Hypertension, essential  Spoke with Dr. Camarena regarding her antihypertensives.  She has a h/o having trouble with a drug in a class but being able to tolerate a different drug in the same class.  For instance, she can not tolerate metoprolol but does not have trouble with labetolol.  She is willing to try taking nifedipine 60 mg daily.  Encouraged to take the tekturna with the morning labetolol and the nifedipine with the evening labetolol. Requested she call or email with BP log in 2 weeks.     - NIFEdipine (PROCARDIA-XL) 60 MG (OSM) 24 hr tablet; Take 1 tablet (60 mg total) by mouth once daily.  Dispense: 30 tablet; Refill: 11    3. Carotid artery disease bilateral  Moderate carotid stenosis noted on US. Bruit only noted on left. Continue statin therapy.      Patient was discussed with but not examined by Dr. Camarena    Follow up in about 3 months (around 9/13/2019).

## 2019-06-17 ENCOUNTER — CLINICAL SUPPORT (OUTPATIENT)
Dept: REHABILITATION | Facility: HOSPITAL | Age: 84
End: 2019-06-17
Attending: INTERNAL MEDICINE
Payer: MEDICARE

## 2019-06-17 DIAGNOSIS — M25.652 DECREASED RANGE OF MOTION OF BOTH HIPS: ICD-10-CM

## 2019-06-17 DIAGNOSIS — R29.898 WEAKNESS OF BOTH HIPS: Primary | ICD-10-CM

## 2019-06-17 DIAGNOSIS — M62.81 QUADRICEPS WEAKNESS: ICD-10-CM

## 2019-06-17 DIAGNOSIS — M25.662 DECREASED RANGE OF MOTION (ROM) OF BOTH KNEES: ICD-10-CM

## 2019-06-17 DIAGNOSIS — M25.661 DECREASED RANGE OF MOTION (ROM) OF BOTH KNEES: ICD-10-CM

## 2019-06-17 DIAGNOSIS — R26.89 IMPAIRMENT OF BALANCE: ICD-10-CM

## 2019-06-17 DIAGNOSIS — M25.651 DECREASED RANGE OF MOTION OF BOTH HIPS: ICD-10-CM

## 2019-06-17 PROCEDURE — 97110 THERAPEUTIC EXERCISES: CPT | Mod: PO | Performed by: PHYSICAL THERAPIST

## 2019-06-17 NOTE — PROGRESS NOTES
"    Physical Therapy Daily Treatment Note     Name: Gerda Lai  Clinic Number: [id number]    Therapy Diagnosis:   Encounter Diagnoses   Name Primary?   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 She does say she has been experiencing some dizziness and will be evaluated.    She was compliant with her home exercise program.  Response to previous treatment: some soreness after the last session.   Functional change: think yamileth walking a little better.     Pain: 0/10  Location: bilateral lower legs and upper legs     Objective     Gerda received therapeutic exercises to develop strength, flexibility and balance for 60  minutes including:    Leg press   Recumbent bike  Upright bike : 6'  UE ergometer  Treadmill   Elliptical       THERAPEUTIC EXERCISE  SUPINE   -SLR x 20  -posterior hip stretch hold 10" x 12   -knee to chest : 4 count x12  -bridging : 2 x 10  -jyotsna ball / hip abd 5" x 12 OTB   - figure 4 piriformis stretch:  10" x 9     SIDELYING  -clams x 15 YTB   -hip abduction  x12 with perturbations     PRONE  Hip rotation RLE x12  Leg swings x12     STANDING.  Mini squats: 2 x 10 (cues)  Step ups at stairs:  x 15 B c/ B UE (cues for decreased UE use)  Step up and over / retro step up and over x 10 each leg.   Lateral step at counter  + MIP at wall, " leg swings     SITTING    MANUAL THERAPY  MODALITY  DIRECT EDUCATION:         Assessment     Completed activities as noted. She maintains weakness in hips due to challenges with SL abduction. Will progress with standing activities, eliminating gravity. The patient did not report pain associated with the exercises in the hips.   Gerda is progressing well towards her goals.   Pt prognosis is Good.   Pt will continue to benefit from skilled outpatient physical therapy to address the deficits listed in the problem list box on initial evaluation, provide pt/family education and to maximize pt's level of independence in the home and community environment.   Pt's spiritual, cultural and educational needs considered and pt agreeable to plan of care and goals.     Anticipated barriers to physical therapy: none    Goals:      Short Term Goals:  6 weeks  1. Patient to demonstrate full passive knee extension to 0 degrees LLE to enhance standing balance and stability  2. Patient to demonstrate increased passive right hip flexion to 105 degrees to improve standing stability   3. Patient to demonstrate increased hip flexor and quad flexibility BLE with increased passive hip extension to facilitate improved standing stability   4. Patient will appreciate elimination of knee pain to palpation BLE  5. Indep with HEP     Long Term Goals: 12 weeks  1. Pt will demonstrate increased right hip flexion AROM by 10 degrees or greater to improve walking and standing stability  2. Pt will demonstrate increased BLE knee extension AROM to -10 degrees or less.   3. Pt will demonstrate increased quad strength to 4-/5 or greater to enhance stability   4. Pt will demonstrate increased gluteal strength strength to 3-/5 or greater to enhance standing stability and reduce fall risk.   5  Demonstrate weight bearing w/o Trendelenburg RLE   6. Restore neuromuscular response to unilateral stability            Plan      Progress with LE strength and  balance       Albert Mills, PT

## 2019-06-18 ENCOUNTER — PATIENT OUTREACH (OUTPATIENT)
Dept: ADMINISTRATIVE | Facility: HOSPITAL | Age: 84
End: 2019-06-18

## 2019-06-19 ENCOUNTER — CLINICAL SUPPORT (OUTPATIENT)
Dept: REHABILITATION | Facility: HOSPITAL | Age: 84
End: 2019-06-19
Attending: INTERNAL MEDICINE
Payer: MEDICARE

## 2019-06-19 DIAGNOSIS — M62.81 QUADRICEPS WEAKNESS: ICD-10-CM

## 2019-06-19 DIAGNOSIS — R29.898 WEAKNESS OF BOTH HIPS: Primary | ICD-10-CM

## 2019-06-19 DIAGNOSIS — M70.62 TROCHANTERIC BURSITIS, LEFT HIP: ICD-10-CM

## 2019-06-19 PROCEDURE — G8978 MOBILITY CURRENT STATUS: HCPCS | Mod: CK,PO | Performed by: PHYSICAL THERAPIST

## 2019-06-19 PROCEDURE — G8979 MOBILITY GOAL STATUS: HCPCS | Mod: CJ,PO | Performed by: PHYSICAL THERAPIST

## 2019-06-19 PROCEDURE — 97110 THERAPEUTIC EXERCISES: CPT | Mod: PO | Performed by: PHYSICAL THERAPIST

## 2019-06-19 NOTE — PROGRESS NOTES
"    Physical Therapy Daily Treatment Note     Name: Gerda Lai  Clinic Number: [id number]    Therapy Diagnosis:   Encounter Diagnoses   Name Primary?   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 Cant lift the legs as much as I would like. The shoulder is feeling better. I can move it much better and less pain.      She was compliant with her home exercise program.  Response to previous treatment: some soreness after the last session.   Functional change: think yamileth walking a little better.     Pain: 0/10  Location: bilateral lower legs and upper legs     Objective     Gerda received therapeutic exercises to develop strength, flexibility and balance for 60  minutes including:    Leg press   Recumbent bike 8'  Upright bike : 6'  UE ergometer  Treadmill   Elliptical       THERAPEUTIC EXERCISE  SUPINE   -SLR x 20  -posterior hip stretch hold 10" x 12   -knee to chest : 4 count x12  -bridging : 2 x 10  -jyotsna ball / hip abd 5" x 12 OTB   - figure 4 piriformis stretch:  10" x 9     SIDELYING  -clams x 15 YTB   -hip abduction  x12 with perturbations     PRONE  Hip rotation RLE x12  Leg swings x12     STANDING.  Mini squats: 2 x 10 (cues)  Step ups at stairs:  x 15 B c/ B UE (cues for decreased UE use)  Step up and over / retro step up and over x 10 each leg.   Lateral step at counter  - MIP at wall,   -leg swings at wall     SITTING    MANUAL " THERAPY  MODALITY  DIRECT EDUCATION:       CMS Impairment/Limitation/Restriction for FOTO shoulder  Survey    Therapist reviewed FOTO scores for Gerda Lai on 6/19/2019.   FOTO documents entered into [employer] - see Media section.    Limitation Score: 41%  Category: Mobility    Current : CK = at least 40% but < 60% impaired, limited or restricted  Goal: CJ = at least 20% but < 40% impaired, limited or restricted           Assessment     The patient was challenged with the bike however she completed the time. She is able to perform all exercises without limitation from leg pain. Weakness persists however she is making slow progress.   Gerda is progressing well towards her goals.   Pt prognosis is Good.   Pt will continue to benefit from skilled outpatient physical therapy to address the deficits listed in the problem list box on initial evaluation, provide pt/family education and to maximize pt's level of independence in the home and community environment.   Pt's spiritual, cultural and educational needs considered and pt agreeable to plan of care and goals.     Anticipated barriers to physical therapy: none    Goals:      Short Term Goals:  6 weeks  1. Patient to demonstrate full passive knee extension to 0 degrees LLE to enhance standing balance and stability  2. Patient to demonstrate increased passive right hip flexion to 105 degrees to improve standing stability   3. Patient to demonstrate increased hip flexor and quad flexibility BLE with increased passive hip extension to facilitate improved standing stability   4. Patient will appreciate elimination of knee pain to palpation BLE  5. Indep with HEP     Long Term Goals: 12 weeks  1. Pt will demonstrate increased right hip flexion AROM by 10 degrees or greater to improve walking and standing stability  2. Pt will demonstrate increased BLE knee extension AROM to -10 degrees or less.   3. Pt will demonstrate increased quad strength to 4-/5 or greater to enhance  stability   4. Pt will demonstrate increased gluteal strength strength to 3-/5 or greater to enhance standing stability and reduce fall risk.   5  Demonstrate weight bearing w/o Trendelenburg RLE   6. Restore neuromuscular response to unilateral stability            Plan      Progress with LE strength and balance       Albert Mills, PT

## 2019-06-24 ENCOUNTER — TELEPHONE (OUTPATIENT)
Dept: CARDIOLOGY | Facility: CLINIC | Age: 84
End: 2019-06-24

## 2019-06-24 ENCOUNTER — CLINICAL SUPPORT (OUTPATIENT)
Dept: REHABILITATION | Facility: HOSPITAL | Age: 84
End: 2019-06-24
Attending: INTERNAL MEDICINE
Payer: MEDICARE

## 2019-06-24 DIAGNOSIS — M25.651 DECREASED RANGE OF MOTION OF BOTH HIPS: ICD-10-CM

## 2019-06-24 DIAGNOSIS — R29.898 WEAKNESS OF BOTH HIPS: Primary | ICD-10-CM

## 2019-06-24 DIAGNOSIS — M25.662 DECREASED RANGE OF MOTION (ROM) OF BOTH KNEES: ICD-10-CM

## 2019-06-24 DIAGNOSIS — R26.89 IMPAIRMENT OF BALANCE: ICD-10-CM

## 2019-06-24 DIAGNOSIS — M62.81 QUADRICEPS WEAKNESS: ICD-10-CM

## 2019-06-24 DIAGNOSIS — M25.661 DECREASED RANGE OF MOTION (ROM) OF BOTH KNEES: ICD-10-CM

## 2019-06-24 DIAGNOSIS — M25.652 DECREASED RANGE OF MOTION OF BOTH HIPS: ICD-10-CM

## 2019-06-24 PROCEDURE — 97110 THERAPEUTIC EXERCISES: CPT | Mod: PO | Performed by: PHYSICAL THERAPIST

## 2019-06-24 NOTE — TELEPHONE ENCOUNTER
----- Message from Elo Garibay MA sent at 6/24/2019  3:20 PM CDT -----  Contact: patient called  [place] please call the patient at [phone] she need clarification on her medications. Thank you.

## 2019-06-24 NOTE — PROGRESS NOTES
"    Physical Therapy Daily Treatment Note     Name: Gerda Lai  Clinic Number: [id number]    Therapy Diagnosis:   Encounter Diagnoses   Name Primary?   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 There is some pain in the right leg and calf muscle. Saw a play yesterday and sat for about an hour and a half.    She was compliant with her home exercise program.  Response to previous treatment: some soreness after the last session.   Functional change: think yamileth walking a little better.     Pain: 0/10  Location: bilateral lower legs and upper legs     Objective     Gerda received therapeutic exercises to develop strength, flexibility and balance for 30  minutes including:    Leg press   Recumbent bike 8'  Upright bike : 6'  UE ergometer  Treadmill   Elliptical       THERAPEUTIC EXERCISE  SUPINE   -SLR x 20  -posterior hip stretch hold    10" x 12   -knee to chest :   4 count x12  -bridging :     2 x 10  -jyotsna ball / hip abd    5" x 12 OTB   - figure 4 piriformis stretch:     10" x 6     SIDELYING  -clams x 15 YTB   -hip abduction  x12 with perturbations     PRONE  Hip rotation RLE x12  Leg swings x12     STANDING.  Mini squats: 2 x 10 (cues)  Step ups at stairs:  x 15 B c/ B UE (cues for decreased UE use)  Step up and over / retro step up and over x 10 each " "leg.   Lateral step at counter  - MIP at wall, x15  -leg swings at wall x12  -narrow base stand airex 15" x 4  -tandem stand  15" x 3 ea.     SITTING    MANUAL THERAPY  MODALITY  DIRECT EDUCATION:       CMS Impairment/Limitation/Restriction for FOTO shoulder  Survey    Therapist reviewed FOTO scores for Gerda Lai on 6/24/2019.   FOTO documents entered into [employer] - see Media section.    Limitation Score: 41%  Category: Mobility    Current : CK = at least 40% but < 60% impaired, limited or restricted  Goal: CJ = at least 20% but < 40% impaired, limited or restricted           Assessment     Activities completed for LE strengthening and stability training. She have some challenge with the right leg in the back otherwise performed w/o limitation.      Gerda is progressing well towards her goals.   Pt prognosis is Good.   Pt will continue to benefit from skilled outpatient physical therapy to address the deficits listed in the problem list box on initial evaluation, provide pt/family education and to maximize pt's level of independence in the home and community environment.   Pt's spiritual, cultural and educational needs considered and pt agreeable to plan of care and goals.     Anticipated barriers to physical therapy: none    Goals:      Short Term Goals:  6 weeks  1. Patient to demonstrate full passive knee extension to 0 degrees LLE to enhance standing balance and stability  2. Patient to demonstrate increased passive right hip flexion to 105 degrees to improve standing stability   3. Patient to demonstrate increased hip flexor and quad flexibility BLE with increased passive hip extension to facilitate improved standing stability   4. Patient will appreciate elimination of knee pain to palpation BLE  5. Indep with HEP     Long Term Goals: 12 weeks  1. Pt will demonstrate increased right hip flexion AROM by 10 degrees or greater to improve walking and standing stability  2. Pt will demonstrate increased BLE " knee extension AROM to -10 degrees or less.   3. Pt will demonstrate increased quad strength to 4-/5 or greater to enhance stability   4. Pt will demonstrate increased gluteal strength strength to 3-/5 or greater to enhance standing stability and reduce fall risk.   5  Demonstrate weight bearing w/o Trendelenburg RLE   6. Restore neuromuscular response to unilateral stability            Plan      Progress with LE strength and balance       Albert Mills, PT

## 2019-06-24 NOTE — TELEPHONE ENCOUNTER
Pt called stating that she was unsure if she should take Labetaolol in addition to Nifedipine. Pt stated that she picked up Nifedipine but didn't start taking it. Pt stated that she is worried if she takes Nifedipine and Labetaolol her bp will be low. Please advise.

## 2019-06-25 NOTE — TELEPHONE ENCOUNTER
Pt reported SBPs in the 160-180 mm Hg range. If that is still correct, she should take the nifedipine in addition to the labetalol. If that is not correct and her SBPs are lower, she should continue labetalol w/o nifedipine.  However her SBP in our office was much lower. She should in that case bring in her machine so that we can verify its accuracy before she starts taking both meds

## 2019-06-26 ENCOUNTER — CLINICAL SUPPORT (OUTPATIENT)
Dept: REHABILITATION | Facility: HOSPITAL | Age: 84
End: 2019-06-26
Attending: INTERNAL MEDICINE
Payer: MEDICARE

## 2019-06-26 DIAGNOSIS — R26.89 IMPAIRMENT OF BALANCE: ICD-10-CM

## 2019-06-26 DIAGNOSIS — M25.651 DECREASED RANGE OF MOTION OF BOTH HIPS: ICD-10-CM

## 2019-06-26 DIAGNOSIS — R29.898 WEAKNESS OF BOTH HIPS: Primary | ICD-10-CM

## 2019-06-26 DIAGNOSIS — M62.81 QUADRICEPS WEAKNESS: ICD-10-CM

## 2019-06-26 DIAGNOSIS — M25.662 DECREASED RANGE OF MOTION (ROM) OF BOTH KNEES: ICD-10-CM

## 2019-06-26 DIAGNOSIS — M25.661 DECREASED RANGE OF MOTION (ROM) OF BOTH KNEES: ICD-10-CM

## 2019-06-26 DIAGNOSIS — M25.652 DECREASED RANGE OF MOTION OF BOTH HIPS: ICD-10-CM

## 2019-06-26 PROCEDURE — 97110 THERAPEUTIC EXERCISES: CPT | Mod: PO | Performed by: PHYSICAL THERAPIST

## 2019-06-26 NOTE — PROGRESS NOTES
"    Physical Therapy Daily Treatment Note     Name: Gerda Lai  Clinic Number: [id number]    Therapy Diagnosis:   Encounter Diagnoses   Name Primary?    Weakness of both hips Yes    Quadriceps weakness     Impairment of balance     Decreased range of motion of both hips     Decreased range of motion (ROM) of both knees      Physician: Cesar Burroughs MD  Physician Orders: PT Eval and Treat shoulder and knee  Medical Diagnosis from Referral:   M15.0 (ICD-10-CM) - Primary osteoarthritis involving multiple joints  Evaluation Date: 5/27/2019  Authorization Period Expiration: 12/31/19  Plan of Care Expiration: 8/27/2019  Visit # / Visits authorized: 5/20  Visit Date: 6/26/2019    Time In: 1315 pm  Time Out: 1415 pm  Total Billable Time:60  minutes     Precautions: Standard and Fall    Subjective     Pt reports: the legs are feeling pretty good today. She says she is not feeling too good and thinks her BP is up. Did not take this AM.    She was compliant with her home exercise program. Do something every day.   Response to previous treatment: Felt fine  Functional change: think yamileth walking a little better.     Pain: 0/10  Location: bilateral lower legs and upper legs     Objective     BP pre-ex - 105/56   BP post-ex - 150/62      Gerda received therapeutic exercises to develop strength, flexibility and balance for 60  minutes including:    Leg press   Recumbent bike 8'  Upright bike : 6'  UE ergometer  Treadmill   Elliptical       THERAPEUTIC EXERCISE  SUPINE   -SLR x 20  -posterior hip stretch hold    10" x 12   -knee to chest :   4 count x12  -bridging :     2 x 10  -jyotsna ball / hip abd    5" x 12 OTB   - figure 4 piriformis stretch:     10" x 6     SIDELYING  -clams x 15 YTB   -hip abduction  x12 with perturbations     PRONE  Hip rotation RLE x12  Leg swings x12     STANDING.  Mini squats: 2 x 10 (cues)  Step ups at stairs:  x 15 B c/ B UE (cues for decreased UE use)  Step up and over / retro step up and over " "x 10 each leg.   Lateral step at counter  - MIP at wall, x15  -leg swings at wall 2x10ea  abd and extension   -narrow base stand airex 15" x 4 eyes open / closed   -tandem stand  15" x 3 ea.   -heel raises x20   -hurdles forward walk / lateral walk   + ladder     SITTING    MANUAL THERAPY  MODALITY  DIRECT EDUCATION:       CMS Impairment/Limitation/Restriction for FOTO shoulder  Survey    Therapist reviewed FOTO scores for Gerda Lai on 6/26/2019.   FOTO documents entered into Present - see Media section.    Limitation Score: 41%  Category: Mobility    Current : CK = at least 40% but < 60% impaired, limited or restricted  Goal: CJ = at least 20% but < 40% impaired, limited or restricted           Assessment     The patient performed mainly CC activities. She is very compliant with HEP and is performing primarily OC exercises at home. She did not encounter any significant challenges with standing activities. Requires assist of ski poles for hurdles. Will progress to agility ladder to challenge balance and stability.   Gerda is progressing well towards her goals.   Pt prognosis is Good.   Pt will continue to benefit from skilled outpatient physical therapy to address the deficits listed in the problem list box on initial evaluation, provide pt/family education and to maximize pt's level of independence in the home and community environment.   Pt's spiritual, cultural and educational needs considered and pt agreeable to plan of care and goals.     Anticipated barriers to physical therapy: none    Goals:      Short Term Goals:  6 weeks  1. Patient to demonstrate full passive knee extension to 0 degrees LLE to enhance standing balance and stability  2. Patient to demonstrate increased passive right hip flexion to 105 degrees to improve standing stability   3. Patient to demonstrate increased hip flexor and quad flexibility BLE with increased passive hip extension to facilitate improved standing stability   4. Patient " will appreciate elimination of knee pain to palpation BLE  5. Indep with HEP     Long Term Goals: 12 weeks  1. Pt will demonstrate increased right hip flexion AROM by 10 degrees or greater to improve walking and standing stability  2. Pt will demonstrate increased BLE knee extension AROM to -10 degrees or less.   3. Pt will demonstrate increased quad strength to 4-/5 or greater to enhance stability   4. Pt will demonstrate increased gluteal strength strength to 3-/5 or greater to enhance standing stability and reduce fall risk.   5  Demonstrate weight bearing w/o Trendelenburg RLE   6. Restore neuromuscular response to unilateral stability            Plan      Progress with LE strength and balance       Albert Mills, PT

## 2019-07-02 ENCOUNTER — LAB VISIT (OUTPATIENT)
Dept: LAB | Facility: HOSPITAL | Age: 84
End: 2019-07-02
Attending: FAMILY MEDICINE
Payer: MEDICARE

## 2019-07-02 ENCOUNTER — OFFICE VISIT (OUTPATIENT)
Dept: PRIMARY CARE CLINIC | Facility: CLINIC | Age: 84
End: 2019-07-02
Payer: MEDICARE

## 2019-07-02 VITALS
RESPIRATION RATE: 20 BRPM | TEMPERATURE: 98 F | HEIGHT: 64 IN | BODY MASS INDEX: 24.77 KG/M2 | WEIGHT: 145.06 LBS | DIASTOLIC BLOOD PRESSURE: 56 MMHG | SYSTOLIC BLOOD PRESSURE: 144 MMHG

## 2019-07-02 DIAGNOSIS — I10 HYPERTENSION, ESSENTIAL: ICD-10-CM

## 2019-07-02 DIAGNOSIS — S46.001A INJURY OF RIGHT ROTATOR CUFF, INITIAL ENCOUNTER: Primary | ICD-10-CM

## 2019-07-02 LAB
ANION GAP SERPL CALC-SCNC: 11 MMOL/L (ref 8–16)
BUN SERPL-MCNC: 28 MG/DL (ref 8–23)
CALCIUM SERPL-MCNC: 9.8 MG/DL (ref 8.7–10.5)
CHLORIDE SERPL-SCNC: 106 MMOL/L (ref 95–110)
CO2 SERPL-SCNC: 22 MMOL/L (ref 23–29)
CREAT SERPL-MCNC: 1 MG/DL (ref 0.5–1.4)
EST. GFR  (AFRICAN AMERICAN): 58.9 ML/MIN/1.73 M^2
EST. GFR  (NON AFRICAN AMERICAN): 51.1 ML/MIN/1.73 M^2
GLUCOSE SERPL-MCNC: 91 MG/DL (ref 70–110)
POTASSIUM SERPL-SCNC: 4.5 MMOL/L (ref 3.5–5.1)
SODIUM SERPL-SCNC: 139 MMOL/L (ref 136–145)

## 2019-07-02 PROCEDURE — 99214 PR OFFICE/OUTPT VISIT, EST, LEVL IV, 30-39 MIN: ICD-10-PCS | Mod: S$PBB,,, | Performed by: FAMILY MEDICINE

## 2019-07-02 PROCEDURE — 36415 COLL VENOUS BLD VENIPUNCTURE: CPT | Mod: PN

## 2019-07-02 PROCEDURE — 99999 PR PBB SHADOW E&M-EST. PATIENT-LVL III: ICD-10-PCS | Mod: PBBFAC,,, | Performed by: FAMILY MEDICINE

## 2019-07-02 PROCEDURE — 80048 BASIC METABOLIC PNL TOTAL CA: CPT

## 2019-07-02 PROCEDURE — 99999 PR PBB SHADOW E&M-EST. PATIENT-LVL III: CPT | Mod: PBBFAC,,, | Performed by: FAMILY MEDICINE

## 2019-07-02 PROCEDURE — 99213 OFFICE O/P EST LOW 20 MIN: CPT | Mod: PBBFAC,PN | Performed by: FAMILY MEDICINE

## 2019-07-02 PROCEDURE — 99214 OFFICE O/P EST MOD 30 MIN: CPT | Mod: S$PBB,,, | Performed by: FAMILY MEDICINE

## 2019-07-02 RX ORDER — DICLOFENAC SODIUM 10 MG/G
GEL TOPICAL 2 TIMES DAILY
Qty: 1 TUBE | Refills: 6 | Status: SHIPPED | OUTPATIENT
Start: 2019-07-02 | End: 2019-12-12 | Stop reason: SDUPTHER

## 2019-07-02 NOTE — PROGRESS NOTES
Subjective:       Patient ID: Gerda Lai is a 86 y.o. female.    Chief Complaint: Hypertension and Shoulder Pain (bilateral, worst in right)    85 yo presents today for further evaluation of right shoulder pain, present for several months.  She was seen in the past for this problem with MRI showin. Massive rotator cuff tear with moderate to severe muscle atrophy.  2. Severe glenohumeral osteoarthritis.  Glenohumeral effusion with synovitis.  3. Long head biceps tendon tear.  4. Severe acromioclavicular arthrosis.  5. Subacromial subdeltoid bursitis.  She had noted improvement in the past by using topical Voltaren gel, but this was changed to liquid form, with worsening of pain.  Pain worse with right arm motion, mostly raising of extremity.  She has not had PT for her shoulder    Review of Systems   Constitutional: Negative for activity change, fatigue and unexpected weight change.   Respiratory: Negative for cough, chest tightness and shortness of breath.    Cardiovascular: Negative for chest pain, palpitations and leg swelling.   Gastrointestinal:        Prior history of colon polyps, last found in   Normal colonoscopy , wishes information on repeat colonoscopy   Musculoskeletal: Positive for arthralgias.        Recurrent right shoulder pain, noted left axillary pain as well over past few weeks.  No axillary lesions noted by pt.  Prior history of breast cancer, scheduled for f/u with Oncology later this month       Objective:      Physical Exam   Constitutional: She appears well-developed and well-nourished. No distress.   Cardiovascular: Normal rate and regular rhythm.   No murmur heard.  Pulmonary/Chest: Effort normal and breath sounds normal. She has no wheezes. She has no rales.   Abdominal: Soft. Bowel sounds are normal. There is no tenderness. There is no guarding.   Musculoskeletal:   Right shoulder exam shows tenderness to palpation along bicipital groove, anterior, post shoulder.  Unable  to raise arm above 90 degrees without help  Left shoulder exam:  FROM, no axillary adenopathy.    Tender to palpation over humeral head       Assessment:       1. Injury of right rotator cuff, initial encounter    2. Hypertension, essential        Plan:         1.  Schedule PT for right shoulder  2.  Try Voltaren gel  3.  BMP  4.  Recommend against further colonoscopies

## 2019-07-08 ENCOUNTER — TELEPHONE (OUTPATIENT)
Dept: PRIMARY CARE CLINIC | Facility: CLINIC | Age: 84
End: 2019-07-08

## 2019-07-08 NOTE — TELEPHONE ENCOUNTER
Patient called with concerns about a change that she made to her BP medication yesterday evening. She typically takes the Nifedipine in the AM, since she wakes up with high BP readings. Pt decided to switch to taking the medication at night time, to see if that would help the morning readings. Patient did not take her Nifedipine yesterday morning & felt poorly all day, then took the medication in the evening. Pt with up this morning feeling mildly dizzy & poorly again. She took her BP this morning & got a reading of 102/56. Since the BP was low this morning she has not yet taken her Labetalol dose this morning. Patient is unsure how to proceed with medication for the day. Please advise.     Pt plans to stay home, rest, & hydrate today.

## 2019-07-08 NOTE — TELEPHONE ENCOUNTER
Pt advised to return to the previous way that she was taking the medication & resume the Labetalol this evening. Per Dr. Sellers.     Patient verbalized understanding & agreed to plan.

## 2019-07-08 NOTE — TELEPHONE ENCOUNTER
----- Message from Dong Saha sent at 7/8/2019 11:36 AM CDT -----  Contact: self/[phone]  Pt has some questions to discuss with  or staff. Please advise.        Thank You

## 2019-07-18 ENCOUNTER — PATIENT MESSAGE (OUTPATIENT)
Dept: ADMINISTRATIVE | Facility: OTHER | Age: 84
End: 2019-07-18

## 2019-07-19 ENCOUNTER — PATIENT OUTREACH (OUTPATIENT)
Dept: OTHER | Facility: OTHER | Age: 84
End: 2019-07-19

## 2019-07-19 NOTE — PROGRESS NOTES
Digital Medicine Enrollment Call    Introduced Mrs. Gerda Lai to Digital Medicine.     Discussed program expectations and requirements.    Introduced digital medicine care team.     Reviewed the importance of self-monitoring for digital medicine participation.     Reviewed that the Digital Medicine team is not available for emergencies and instructed the patient to call 911 or [facility] ([phone] or [phone]) if one arises.          Last 5 Patient Entered Readings                                      Current 30 Day Average: 127/74     Recent Readings 7/18/2019 7/18/2019    SBP (mmHg) 127 127    DBP (mmHg) 74 74    Pulse 62 62

## 2019-07-19 NOTE — PROGRESS NOTES
Last 5 Patient Entered Readings                                      Current 30 Day Average: 127/74     Recent Readings 7/18/2019 7/18/2019    SBP (mmHg) 127 127    DBP (mmHg) 74 74    Pulse 62 62

## 2019-07-19 NOTE — LETTER
July 19, 2019     Gerda Lai  [address] [address]       Dear Gerda,    Welcome to [facility]! Our goal is to make care effective, proactive and convenient by using data you send us from home to better treat your chronic conditions.          My name is Monica Gutiérerz, and I am your dedicated Digital Medicine clinician. As an expert in medication management, I will help ensure that the medications you are taking continue to provide the intended benefits and help you reach your goals. You can reach me directly at [phone] or by sending me a message directly through your [facility] account.      I am Celina Calloway and I will be your health . My job is to help you identify lifestyle changes to improve your disease control. We will talk about nutrition, exercise, and other ways you may be able to adjust your current habits to better your health. Additionally, we will help ensure you are completing the tests and screenings that are necessary to help manage your conditions. You can reach me directly at [phone] or by sending me a message directly through your [facility] account.    Most importantly, YOU are at the center of this team. Together, we will work to improve your overall health and encourage you to meet your goals for a healthier lifestyle.     What we expect from YOU:  · Please take frequent home blood pressure measurements. We ask that you take at least 1 blood pressure reading per week, but more information will better help us get you know you. Be sure you rest for a few minutes before taking the reading in a quiet, comfortable place.     Be available to receive phone calls or [facility] messages, when appropriate, from your care team. Please let us know if there are any specific days or times that work best for us to reach you via phone.     Complete routine tests and screenings. Dont worry, we will help keep you on track!           What you should  expect from your Digital Medicine Care Team:   We will work with you to create a personalized plan of care and provide you with encouragement and education, including regarding lifestyle changes, that could help you manage your disease states.     We will adjust your current medications, if needed, and continue to monitor your long-term progress.     We will provide you and your physician with monthly progress reports after you have been in the program for more than 30 days.     We will send you reminders through [facility] and text messages to help ensure you do not miss any testing deadlines to help manage your disease states.    You will be able to reach us by phone or through your [facility] account by clicking our names under Care Team on the right side of the home screen.    I look forward to working with you to achieve your blood pressure goals!    We look forward to working with you to help manage your health,    Sincerely,    Your Digital Medicine Team    Please visit our websites to learn more:   · Hypertension: [email or web address]      Remember, we are not available for emergencies. If you have an emergency, please contact your doctors office directly or call [facility] on-call ([phone] or [phone]) or [phone].

## 2019-07-22 ENCOUNTER — CLINICAL SUPPORT (OUTPATIENT)
Dept: AUDIOLOGY | Facility: CLINIC | Age: 84
End: 2019-07-22
Payer: MEDICARE

## 2019-07-22 ENCOUNTER — OFFICE VISIT (OUTPATIENT)
Dept: OTOLARYNGOLOGY | Facility: CLINIC | Age: 84
End: 2019-07-22
Payer: MEDICARE

## 2019-07-22 VITALS — HEART RATE: 62 BPM | DIASTOLIC BLOOD PRESSURE: 58 MMHG | SYSTOLIC BLOOD PRESSURE: 128 MMHG

## 2019-07-22 DIAGNOSIS — H81.10 BENIGN PAROXYSMAL POSITIONAL VERTIGO, UNSPECIFIED LATERALITY: Primary | ICD-10-CM

## 2019-07-22 DIAGNOSIS — H90.3 SENSORINEURAL HEARING LOSS, BILATERAL: ICD-10-CM

## 2019-07-22 DIAGNOSIS — H90.3 SENSORINEURAL HEARING LOSS OF BOTH EARS: Primary | ICD-10-CM

## 2019-07-22 DIAGNOSIS — Z86.69 HISTORY OF HEARING LOSS: ICD-10-CM

## 2019-07-22 DIAGNOSIS — Z97.4 WEARS HEARING AID IN BOTH EARS: ICD-10-CM

## 2019-07-22 DIAGNOSIS — R42 LIGHTHEADEDNESS: ICD-10-CM

## 2019-07-22 DIAGNOSIS — R03.1 LOW BLOOD PRESSURE READING: ICD-10-CM

## 2019-07-22 PROCEDURE — 99213 PR OFFICE/OUTPT VISIT, EST, LEVL III, 20-29 MIN: ICD-10-PCS | Mod: S$PBB,,, | Performed by: OTOLARYNGOLOGY

## 2019-07-22 PROCEDURE — 99214 OFFICE O/P EST MOD 30 MIN: CPT | Mod: PBBFAC | Performed by: OTOLARYNGOLOGY

## 2019-07-22 PROCEDURE — 92567 TYMPANOMETRY: CPT | Mod: PBBFAC | Performed by: AUDIOLOGIST

## 2019-07-22 PROCEDURE — 92557 COMPREHENSIVE HEARING TEST: CPT | Mod: PBBFAC | Performed by: AUDIOLOGIST

## 2019-07-22 PROCEDURE — 99999 PR PBB SHADOW E&M-EST. PATIENT-LVL IV: CPT | Mod: PBBFAC,,, | Performed by: OTOLARYNGOLOGY

## 2019-07-22 PROCEDURE — 99999 PR PBB SHADOW E&M-EST. PATIENT-LVL IV: ICD-10-PCS | Mod: PBBFAC,,, | Performed by: OTOLARYNGOLOGY

## 2019-07-22 PROCEDURE — 99213 OFFICE O/P EST LOW 20 MIN: CPT | Mod: S$PBB,,, | Performed by: OTOLARYNGOLOGY

## 2019-07-22 NOTE — LETTER
July 23, 2019      Nikole Stubbs NP  [address]  [facility] [address]           [facility] - Otorhinolaryngology  [address]  [facility] [address]  Phone: [phone]  Fax: [phone]          Patient: Gerda Lai   MR Number: [id number]   YOB: 1933   Date of Visit: 7/22/2019       Dear Nikole Stubbs:    Thank you for referring Gerda Lai to me for evaluation. Attached you will find relevant portions of my assessment and plan of care.    If you have questions, please do not hesitate to call me. I look forward to following Gerda Lai along with you.    Sincerely,    Pa Belle III, MD    Enclosure  CC:  No Recipients    If you would like to receive this communication electronically, please contact [email or web address] or [phone] to request more information on "[employer] Link access.    For providers and/or their staff who would like to refer a patient to [facility], please contact us through our one-stop-shop provider referral line, [facility], at [phone].    If you feel you have received this communication in error or would no longer like to receive these types of communications, please e-mail [email or web address]

## 2019-07-22 NOTE — PROGRESS NOTES
CC:  Dizziness induced by lying down; recent blood pressure medication changes  HPI:Ms. Lai is an 86-year-old  female who indicates that recent change in her blood pressure medication which she believes is correlated with her recent dizziness and lightheadedness symptoms.  She indicates dizziness induced by lying down when going to bed at night specifically.  She indicates slight dizziness symptoms which last a few seconds only.  She is status post carotid ultrasound studies which showed indicated her 50-59% bilateral carotid stenosis ease.  She is followed by Dr. Camarena. A  Cardiologist  nurse practitioner recently examined her 06/13/2019 for follow-up of hypertension and ongoing dizziness mainly in the mornings.  She was diagnosed with vertigo and given an ambulatory consultation appointment with the ENT service.  She was also diagnosed with essential hypertension.  She was having trouble tolerating metoprolol but she was able to take labetalol according to the notes.  She tried nifedipine.    She was diagnosed with bilateral carotid artery disease with moderate carotid stenosis noted per ultrasound; a bruit was noted on the left side. Statin therapy was continued.  Ms. Lai was last examined by me in early February this year.  She has considered change in hearing amplification technology as she was not satisfied with the day LT of the instrument she was using at the time i.e. small eye TCE hearing aids.  An audiometric study was completed at that time which indicated her multi frequency slightly asymmetric sensorineural hearing losses with SRT scores measuring 35 decibels for the right ear versus 45 decibels for the left.  There was evidence for both a low-frequency an more pronounced high-frequency bilateral hearing losses with better hearing noted at 2 K in both ears then.  She completed another audiometric study today.    She completed a CT scan of the head without contrast in March 2017 which  indicated no evidence of acute intracranial pathology.  The mastoid air cells and paranasal sinuses were essentially clear.    Past Medical History:   Diagnosis Date    Arthritis     Basal cell carcinoma 09/2016    right post auricular neck     Breast cancer 1992    right    Cataract     Fibromyalgia     History of measles as a child     Hyperlipidemia     Hypertension     Personal history of colonic polyps     Pneumonia     SCC (squamous cell carcinoma) 2015    R chest    SCC (squamous cell carcinoma) 2016    left medial shoulder    SCC (squamous cell carcinoma) 2017    left knee    Shingles     Squamous cell carcinoma 2015    right forearm    Thyroid disease     Vaginitis     Allergies:  Sulfa, clarithromycin, Flexeril, iodine, lisinopril, losartan, metoprolol, tramadol, verapamil, Flexeril, Voltaren  Current Outpatient Medications on File Prior to Visit   Medication Sig Dispense Refill    acetaminophen (TYLENOL) 650 MG TbSR Take 650 mg by mouth as needed (pain).       alendronate (FOSAMAX) 35 MG tablet Take 1 tablet (35 mg total) by mouth every 7 days. 4 tablet 11    B INFANTIS/B ANI/B JOSE/B BIFID (PROBIOTIC 4X ORAL) Take 1 tablet by mouth daily      coenzyme Q10 (CO Q-10) 100 mg capsule Take 100 mg by mouth once daily.      diclofenac sodium (VOLTAREN) 1 % Gel Apply topically 2 (two) times daily. 1 Tube 6    diclofenac sodium 1.5 % Drop APPLY 40 DROPS TO EACH PAINFUL AREA (MAXIMUM 2 AREAS) UP TO 4 TIMES DAILY.  DROPS/DAY.  3    glucosamine-chondroitin 500-400 mg tablet Take 1 tablet by mouth once daily.       ipratropium (ATROVENT) 0.03 % nasal spray 2 sprays by Nasal route 2 (two) times daily.      labetalol (NORMODYNE) 100 MG tablet TAKE 1 AND 1/2 TABLETS BY MOUTH TWICE A  tablet 1    LORazepam (ATIVAN) 0.5 MG tablet TAKE ONE-HALF TABLET BY MOUTH TWICE A DAY 60 tablet 0    multivitamin capsule Take 1 capsule by mouth once daily.      NIFEdipine (PROCARDIA-XL) 60 MG  (OSM) 24 hr tablet Take 1 tablet (60 mg total) by mouth once daily. 30 tablet 11    omeprazole (PRILOSEC OTC) 20 MG tablet Take 20 mg by mouth once daily.        pravastatin (PRAVACHOL) 40 MG tablet TAKE 1 TABLET (40 MG TOTAL) BY MOUTH ONCE DAILY. 90 tablet 3    spironolactone (ALDACTONE) 50 MG tablet TAKE 1 TABLET BY MOUTH EVERY DAY 30 tablet 7    TEKTURNA 300 mg Tab Take 300 mg by mouth once daily.        No current facility-administered medications on file prior to visit.          PE:  Blood pressure 128/58 pulse 62 height 5 ft 4 in weight 145  General:  Alert and oriented lady in no acute distress  Both ears were examined under the microscope in the micro procedure room.  A tiny amount of dry cerumen debris is suction from the left ear canal.  Both eardrums are intact and clear as visualized directly in both middle ear spaces are well aerated.  The patient completed an audiometric study today the results of which were duplicated below and compared to those a previous studies.        DIAGNOSIS:     ICD-10-CM ICD-9-CM    1. Benign paroxysmal positional vertigo, unspecified laterality H81.10 386.11 Ambulatory referral to Audiology   2. Lightheadedness R42 780.4    3. Low blood pressure reading R03.1 796.3     recent change in medication   4. History of hearing loss Z86.69 V12.49 Ambulatory referral to Audiology   5. Wears hearing aid in both ears Z97.4 [id number] Ambulatory referral to Audiology   6. Sensorineural hearing loss, bilateral H90.3 389.18 Ambulatory referral to Audiology     PLAN: Audiometry reviewed; results compared to those of prevous   Low sodium diet may help; instruction sheets provided  Consider Perry Hallpike testing re: positional vertigo; call to schedule  Vertigo work-up literature provided

## 2019-07-22 NOTE — PATIENT INSTRUCTIONS
Audiometry reviewed; results compared to those of prevous   Low sodium diet may help; instruction sheets provided  Consider Perry Hallpike testing re: positional vertigo; call to schedule  Vertigo work-up literature provided

## 2019-07-22 NOTE — PROGRESS NOTES
Gerda Lai was seen in the clinic today for an audiological evaluation.  Ms. Lai reported that she is experiencing vertigo but believes it to be medication related.  She utilizes binaural amplification.    Audiological testing revealed a moderate rising to mild sloping to moderately-severe sensorineural hearing loss for the right ear and a moderate rising to mild sloping to severe sensorineural hearing loss for the left ear.  A speech reception threshold was obtained at 35 dBHL for the right ear and at 40 dBHL for the left ear.  Speech discrimination was 92% for each ear.  Note, air conduction thresholds were verified using both headphones and inserts.    Tympanometry testing revealed a Type A tympanogram for each ear.    Recommendations:  1. Otologic evaluation  2. Annual audiological evaluation  3. Hearing protection when in noise   4. Continue use of binaural amplification

## 2019-07-23 ENCOUNTER — TELEPHONE (OUTPATIENT)
Dept: CARDIOLOGY | Facility: CLINIC | Age: 84
End: 2019-07-23

## 2019-07-23 NOTE — TELEPHONE ENCOUNTER
----- Message from Tess Dallas sent at 7/23/2019  3:37 PM CDT -----  Contact: pt  Pt saw Nikole on 6/13/19 and was switched to NIFEdipine (PROCARDIA-XL) 60 MG (OSM) 24 hr tablet . Route: Take 1 tablet (60 mg total) by mouth once daily. - Oral  And says it did bring her BP down but her ankles and feet are now swelling and she is concerned.  She can be reached at [phone].    Thank you

## 2019-07-24 ENCOUNTER — OFFICE VISIT (OUTPATIENT)
Dept: HEMATOLOGY/ONCOLOGY | Facility: CLINIC | Age: 84
End: 2019-07-24
Payer: MEDICARE

## 2019-07-24 ENCOUNTER — HOSPITAL ENCOUNTER (OUTPATIENT)
Dept: RADIOLOGY | Facility: HOSPITAL | Age: 84
Discharge: HOME | End: 2019-07-24
Attending: NURSE PRACTITIONER
Payer: MEDICARE

## 2019-07-24 VITALS
HEART RATE: 56 BPM | SYSTOLIC BLOOD PRESSURE: 173 MMHG | OXYGEN SATURATION: 97 % | WEIGHT: 142.44 LBS | TEMPERATURE: 97 F | RESPIRATION RATE: 18 BRPM | BODY MASS INDEX: 25.24 KG/M2 | HEIGHT: 63 IN | DIASTOLIC BLOOD PRESSURE: 76 MMHG

## 2019-07-24 DIAGNOSIS — C50.011 MALIGNANT NEOPLASM OF NIPPLE OF RIGHT BREAST IN FEMALE, UNSPECIFIED ESTROGEN RECEPTOR STATUS: ICD-10-CM

## 2019-07-24 DIAGNOSIS — Z85.3 PERSONAL HISTORY OF BREAST CANCER: Primary | ICD-10-CM

## 2019-07-24 PROCEDURE — 77062 MAMMO DIGITAL DIAGNOSTIC BILAT WITH TOMOSYNTHESIS_CAD: ICD-10-PCS | Mod: 26,,, | Performed by: RADIOLOGY

## 2019-07-24 PROCEDURE — 99999 PR PBB SHADOW E&M-EST. PATIENT-LVL V: ICD-10-PCS | Mod: PBBFAC,,, | Performed by: INTERNAL MEDICINE

## 2019-07-24 PROCEDURE — 99215 OFFICE O/P EST HI 40 MIN: CPT | Mod: PBBFAC | Performed by: INTERNAL MEDICINE

## 2019-07-24 PROCEDURE — 77062 BREAST TOMOSYNTHESIS BI: CPT | Mod: 26,,, | Performed by: RADIOLOGY

## 2019-07-24 PROCEDURE — 99213 OFFICE O/P EST LOW 20 MIN: CPT | Mod: S$PBB,,, | Performed by: INTERNAL MEDICINE

## 2019-07-24 PROCEDURE — 77066 MAMMO DIGITAL DIAGNOSTIC BILAT WITH TOMOSYNTHESIS_CAD: ICD-10-PCS | Mod: 26,,, | Performed by: RADIOLOGY

## 2019-07-24 PROCEDURE — 99213 PR OFFICE/OUTPT VISIT, EST, LEVL III, 20-29 MIN: ICD-10-PCS | Mod: S$PBB,,, | Performed by: INTERNAL MEDICINE

## 2019-07-24 PROCEDURE — 77066 DX MAMMO INCL CAD BI: CPT | Mod: 26,,, | Performed by: RADIOLOGY

## 2019-07-24 PROCEDURE — 99999 PR PBB SHADOW E&M-EST. PATIENT-LVL V: CPT | Mod: PBBFAC,,, | Performed by: INTERNAL MEDICINE

## 2019-07-24 PROCEDURE — 77066 DX MAMMO INCL CAD BI: CPT | Mod: TC,PO

## 2019-07-24 NOTE — PROGRESS NOTES
Subjective:       Patient ID: Gerda Lai is a 86 y.o. female.    Chief Complaint: No chief complaint on file.    HPI     Mrs. Lai returns today for follow up   Mrs. Lai has a remote history of breast cancer 27 years ago.  At that time she had been diagnosed with cancer of the right breast. She was treated on an NSABP protocol with lumpectomy, radiation therapy, and tamoxifen. She has remained disease free since then.   A mammogram earlier today was read as BIRADS II, and a one year follow up was recommended.      Review of Systems  Overall she is feeling fair.  She is complaining of chronic pains, primarily involving her right shoulder.  ECOG PS is 1.  She denies any anxiety, depression, easy bruising, fevers, chills, night sweats, weight loss, nausea, vomiting, diarrhea, constipation, diplopia, blurred vision, headache, abdominal pain, or difficulty ambulating.     Objective:      Physical Exam  GENERAL: She is alert, oriented to time, place, person, pleasant, well nourished, in no acute physical distress.   VITAL SIGNS: Reviewed.   HEENT: Normal. There are no nasal, oral, lip, gingival, auricular, lid, or conjunctival lesions. Mucosae are moist and pink, and there is no thrush. Pupils are equal, reactive to light and accommodation.   Extraocular muscle movements are intact.   NECK: Supple without JVD, adenopathy, or thyromegaly.   LUNGS: Clear to auscultation without wheezing, rales, or rhonchi.   CARDIOVASCULAR: Reveals an S1, S2, no murmurs, no rubs, no gallops.   BREASTS: A lumpectomy scar is seen at the 9 o'clock position on the right breast and at the 2 o'clock position on the left breast. There are no breast masses in the left breast, while there is dense fibrosis underlying her right sided lumpectomy incision.  This has been noted previously  LYMPHATIC: There is no cervical, axillary, or supraclavicular adenopathy.   SKIN: Does not have induration, petechiae, rashes, or ecchymoses.   NEUROLOGIC:  Motor function is 5/5, DTRs are 0-1+ bilaterally, symmetrical,   and cranial nerves within normal limits.        Assessment:       1. Malignant neoplasm of nipple of right breast in female, estrogen receptor positive, clinically JEN, doing well.       Plan:        I have asked her to return in a year with another mammogram.  Her questions were answered to her satisfaction.

## 2019-07-25 ENCOUNTER — TELEPHONE (OUTPATIENT)
Dept: PRIMARY CARE CLINIC | Facility: CLINIC | Age: 84
End: 2019-07-25

## 2019-07-25 ENCOUNTER — PATIENT OUTREACH (OUTPATIENT)
Dept: OTHER | Facility: OTHER | Age: 84
End: 2019-07-25

## 2019-07-25 DIAGNOSIS — I10 HYPERTENSION, ESSENTIAL: ICD-10-CM

## 2019-07-25 RX ORDER — NIFEDIPINE 90 MG/1
90 TABLET, EXTENDED RELEASE ORAL DAILY
Qty: 30 TABLET | Refills: 11 | Status: SHIPPED | OUTPATIENT
Start: 2019-07-25 | End: 2019-08-15 | Stop reason: SINTOL

## 2019-07-25 NOTE — TELEPHONE ENCOUNTER
Spoke with patient who states that someone had just called her about the increased dosage.  Advised that I did not see it documented in her record, so I need to make sure that she is aware.  States she also takes Labetalol and she gets very dizzy, so she's been waiting for about an hour to take the nifedipine.  Wants to know if that is okay.  Advised that is fine, the nifedipine is a 24hour pill, just take it around the same time each day, and if she continues to feel poorly to let us know.  States she will.

## 2019-07-25 NOTE — PROGRESS NOTES
"Last 5 Patient Entered Readings                                      Current 30 Day Average: 159/78     Recent Readings 7/24/2019 7/24/2019 7/24/2019 7/24/2019 7/23/2019    SBP (mmHg) 176 176 187 187 174    DBP (mmHg) 74 74 84 84 78    Pulse 53 53 54 54 54        Patient reports she was not available to talk at this time. She was "dozing off". She requested for me to give her a call next week. Will follow up then    "

## 2019-07-25 NOTE — TELEPHONE ENCOUNTER
Please call pt and ask her to increase Nifedipine to 90 mg daily.  Prescription called to her pharmacy

## 2019-07-25 NOTE — TELEPHONE ENCOUNTER
Spoke to patient and advised per provider;Please call pt and ask her to increase Nifedipine to 90 mg daily.  Prescription called to her pharmacy. Patient verbalized understanding.

## 2019-07-26 RX ORDER — LORAZEPAM 0.5 MG/1
TABLET ORAL
Qty: 30 TABLET | Refills: 0 | Status: SHIPPED | OUTPATIENT
Start: 2019-07-26 | End: 2019-08-30 | Stop reason: SDUPTHER

## 2019-07-29 ENCOUNTER — OFFICE VISIT (OUTPATIENT)
Dept: ORTHOPEDICS | Facility: CLINIC | Age: 84
End: 2019-07-29
Payer: MEDICARE

## 2019-07-29 VITALS
BODY MASS INDEX: 25.16 KG/M2 | WEIGHT: 142 LBS | SYSTOLIC BLOOD PRESSURE: 125 MMHG | HEIGHT: 63 IN | DIASTOLIC BLOOD PRESSURE: 61 MMHG | HEART RATE: 59 BPM

## 2019-07-29 DIAGNOSIS — M12.811 ROTATOR CUFF ARTHROPATHY OF RIGHT SHOULDER: Primary | ICD-10-CM

## 2019-07-29 PROCEDURE — 20610 DRAIN/INJ JOINT/BURSA W/O US: CPT | Mod: PBBFAC | Performed by: PHYSICIAN ASSISTANT

## 2019-07-29 PROCEDURE — 99999 PR PBB SHADOW E&M-EST. PATIENT-LVL IV: ICD-10-PCS | Mod: PBBFAC,,, | Performed by: PHYSICIAN ASSISTANT

## 2019-07-29 PROCEDURE — 99213 OFFICE O/P EST LOW 20 MIN: CPT | Mod: 25,S$PBB,, | Performed by: PHYSICIAN ASSISTANT

## 2019-07-29 PROCEDURE — 99213 PR OFFICE/OUTPT VISIT, EST, LEVL III, 20-29 MIN: ICD-10-PCS | Mod: 25,S$PBB,, | Performed by: PHYSICIAN ASSISTANT

## 2019-07-29 PROCEDURE — 99214 OFFICE O/P EST MOD 30 MIN: CPT | Mod: PBBFAC | Performed by: PHYSICIAN ASSISTANT

## 2019-07-29 PROCEDURE — 99999 PR PBB SHADOW E&M-EST. PATIENT-LVL IV: CPT | Mod: PBBFAC,,, | Performed by: PHYSICIAN ASSISTANT

## 2019-07-29 PROCEDURE — 20610 DRAIN/INJ JOINT/BURSA W/O US: CPT | Mod: S$PBB,RT,, | Performed by: PHYSICIAN ASSISTANT

## 2019-07-29 PROCEDURE — 20610 PR DRAIN/INJECT LARGE JOINT/BURSA: ICD-10-PCS | Mod: S$PBB,RT,, | Performed by: PHYSICIAN ASSISTANT

## 2019-07-29 RX ORDER — METHYLPREDNISOLONE ACETATE 80 MG/ML
80 INJECTION, SUSPENSION INTRA-ARTICULAR; INTRALESIONAL; INTRAMUSCULAR; SOFT TISSUE
Status: COMPLETED | OUTPATIENT
Start: 2019-07-29 | End: 2019-07-29

## 2019-07-29 RX ADMIN — METHYLPREDNISOLONE ACETATE 80 MG: 80 INJECTION, SUSPENSION INTRALESIONAL; INTRAMUSCULAR; INTRASYNOVIAL; SOFT TISSUE at 05:07

## 2019-07-29 NOTE — PROGRESS NOTES
Subjective:      Patient ID: Gerda Lai is a 86 y.o. female.    Chief Complaint: Pain and Injections of the Right Shoulder    HPI  Patient returns regarding her right shoulder. 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 She saw Dr. Quiñonez in January and non-op tx was decided. She received a cortisone injection in May but it provided short relief. Her shoulder pain feels worse. She has limited ROM due to the pain. She uses voltaren gel with some relief. Her pain is 8/10 at this time.   Review of Systems   Constitution: Negative for chills, fever and night sweats.   Cardiovascular: Negative for chest pain.   Respiratory: Negative for cough and shortness of breath.    Hematologic/Lymphatic: Does not bruise/bleed easily.   Skin: Negative for color change.   Gastrointestinal: Negative for heartburn.   Genitourinary: Negative for dysuria.   Neurological: Negative for numbness and paresthesias.   Psychiatric/Behavioral: Negative for altered mental status.   Allergic/Immunologic: Negative for persistent infections.         Objective:            General    Vitals reviewed.  Constitutional: She is oriented to person, place, and time. She appears well-developed and well-nourished.   Cardiovascular: Normal rate.    Neurological: She is alert and oriented to person, place, and time.         Right Shoulder Exam     Range of Motion   Active abduction:  90 abnormal   Forward Flexion:  90 abnormal   External Rotation 0 degrees: abnormal   Internal rotation 0 degrees: abnormal     Tests & Signs   Damon test: negative  Impingement: negative  Speed's Test: negative    Other   Sensation: normal    Comments:  ROM limited due to pain.     Vascular Exam     Right Pulses      Radial:                    2+          X-ray: reviewed by myself. No acute abnormality.  Severe DJD at right shoulder.  Secondary signs of rotator cuff pathology also.        Assessment:       Encounter Diagnosis   Name Primary?    Rotator cuff arthropathy  of right shoulder Yes          Plan:       Discussed treatment options with patient. She would like another injection today. If her pain does not improve, she would like to see someone at sports medicine. We would refer her to Dr. Garces. C prn.     PROCEDURE:  I have explained the risks, benefits, and alternatives of the procedure in detail.  The patient voices understanding and all questions have been answered.  The patient agrees to proceed as planned. So after I performed a sterile prep of the skin in the normal fashion the right shoulder is injected from the posterior approach using a 22 gauge needle with a combination of 4cc 1% plain lidocaine and 80 mg of depo medrol. The patient is cautioned and immediate relief of pain is secondary to the local anesthetic and will be temporary.  After the anesthetic wears off there may be a increase in pain that may last for a few hours or a few days and they should use ice to help alleviate this flair up of pain.

## 2019-07-31 ENCOUNTER — TELEPHONE (OUTPATIENT)
Dept: PRIMARY CARE CLINIC | Facility: CLINIC | Age: 84
End: 2019-07-31

## 2019-07-31 NOTE — TELEPHONE ENCOUNTER
----- Message from Britt Reina sent at 7/31/2019  9:03 AM CDT -----  Contact: self   Patient states she is having swelling in her feet possibly caused by the NIFEdipine (PROCARDIA-XL) 90 MG (OSM) 24 hr tablet medication. Patient states the dosage amount was increased. Please call & advise

## 2019-07-31 NOTE — TELEPHONE ENCOUNTER
Message left on patient's callback number reporting Dr. Sellers's message that, if her blood pressure is better, then continue present dose.  She can use the support stockings that were prescribed in the past.  Return call to the clinic.

## 2019-07-31 NOTE — TELEPHONE ENCOUNTER
Patient states that the swelling began before the increase in Nifedipine.  States the swelling is during the day when she is sitting watching TV without her legs elevated, it does go down at night when she's in bed.  Advised patient to elevate her legs while she is watching TV, and this should help to alleviate some of the swelling.  I will give Dr. Sellers the message and contact her with his response.

## 2019-08-01 ENCOUNTER — CLINICAL SUPPORT (OUTPATIENT)
Dept: REHABILITATION | Facility: HOSPITAL | Age: 84
End: 2019-08-01
Attending: FAMILY MEDICINE
Payer: MEDICARE

## 2019-08-01 DIAGNOSIS — M25.611 DECREASED ROM OF RIGHT SHOULDER: ICD-10-CM

## 2019-08-01 PROCEDURE — 97110 THERAPEUTIC EXERCISES: CPT | Mod: PO

## 2019-08-01 PROCEDURE — 97162 PT EVAL MOD COMPLEX 30 MIN: CPT | Mod: PO

## 2019-08-01 NOTE — PLAN OF CARE
[facility]  Physical Therapy Initial Evaluation    Name: Gerda Lai  Clinic Number: [id number]    Therapy Diagnosis:   Encounter Diagnosis   Name Primary?    Decreased ROM of right shoulder      Physician: Demario Sellers MD    Physician Orders: PT Eval and Treat   Medical Diagnosis from Referral:   Diagnosis   S46.001A (ICD-10-CM) - Injury of right rotator cuff, initial encounter     Evaluation Date: 8/1/2019  Authorization Period Expiration: 7/1/2020  Plan of Care Expiration: 10/10/19  Visit # / Visits authorized: 1/ 1    Time In: 1:13 PM  Time Out: 200 PM  Total Billable Time: 47 minutes    Precautions: Standard    Subjective   Date of onset: Chronic  History of current condition - Gerda reports: her R shoulder pain has worsened over the past week. Today, she has been unable to put on a bra due to pain. She states she does exercises in the AM for her shoulder, and she may have overdone it. She states that she tries to massage her shoulder and winds up bruising her self. She uses Volteran to relieve the pain which helps some. She states she has pain everyday.  She states she has been unable to lift her arm without using her other side for help. She saw Dr. Quiñonez in January and non-op tx was decided. She does not want to have surgery as she feels this will only make matters worse. She received a cortisone injection in May but it provided short relief. She also had an injection two days ago which may have helped some.Her shoulder pain feels worse. She has limited ROM due to the pain. She uses voltaren gel with some relief. Her x-rays show severe arthritis. Numbness and tingling present in her R hand digits 1-3.     Medical History:   Past Medical History:   Diagnosis Date    Arthritis     Basal cell carcinoma 09/2016    right post auricular neck     Breast cancer 1992    right    Cataract     Fibromyalgia     History of measles as a child     Hyperlipidemia     Hypertension      Personal history of colonic polyps     Pneumonia     SCC (squamous cell carcinoma) 2015    R chest    SCC (squamous cell carcinoma) 2016    left medial shoulder    SCC (squamous cell carcinoma) 2017    left knee    Shingles     Squamous cell carcinoma 2015    right forearm    Thyroid disease     Vaginitis        Surgical History:   Gerda Lai  has a past surgical history that includes thyriodectomy; Total hip arthroplasty; tonsillectomy; Adenoidectomy; Cataract extraction w/  intraocular lens implant (Bilateral); Yag  (Left); Breast lumpectomy (Right, 1992); Breast biopsy (Left); Eye surgery; Joint replacement; and Tonsillectomy.    Medications:   Gerda has a current medication list which includes the following prescription(s): acetaminophen, alendronate, b infantis/b ani/b magali/b bifid, coenzyme q10, diclofenac sodium, diclofenac sodium, glucosamine-chondroitin, ipratropium, labetalol, lorazepam, multivitamin, nifedipine, omeprazole, pravastatin, spironolactone, and tekturna.    Allergies:   Review of patient's allergies indicates:   Allergen Reactions    Sulfa (sulfonamide antibiotics) Rash    Clarithromycin Other (See Comments)     Weak, extreme fatigue. dizziness    Flexeril [cyclobenzaprine] Other (See Comments)     Dizziness    Iodine and iodide containing products     Lisinopril Other (See Comments)     Cough and sensation of throat swelling/?angioedema    Losartan Rash    Metoprolol Swelling     Tightness in throat    Tramadol Other (See Comments)     Dizzy and weak    Verapamil (bulk) Palpitations    Voltaren [diclofenac sodium] Other (See Comments)     Drops blood pressure        Imaging, see chart    Prior Therapy: Yes  Social History: Pt lives alone.  Occupation: Retired  Prior Level of Function: able to use her R UE for ADLs such as bathing, dressing, eating  Current Level of Function: limited with dressing (unable to wear bra), bathing (unable to do her hair with her R UE),  unable to open jars    Pain:  Current 9/10, worst 9/10, best 5/10   Location: right shoulder   Description: Aching, Dull, Tingling, Numb and Sharp  Aggravating Factors: using her R arm to do any active motion  Easing Factors: volteran, tylenol    Pts goals: to regain use of her R arm    Objective     Posture: significant HFRS posture    Neck flexion: 75%  Neck extension: 25%  Rotation L 50%  R Rotation 25%    Active Range of Motion:   Shoulder Right   Flexion 30   Abduction 20   ER at 0 31   ER at 90 unable   IR T9      Unable to hold AROM for any amount of time    Passive Range of Motion:   Shoulder Right   Flexion 165   Abduction 152   ER at 0 42   ER at 90 NT   IR 60     Upper Extremity Strength  (R) UE  (L) UE    Shoulder flexion: Unable to test Shoulder flexion: 4+/5   Shoulder Abduction: Unable to test Shoulder abduction: 4+/5   Shoulder ER 3+/5 Shoulder ER 4+/5   Shoulder IR 4/5 Shoulder IR 4/5   Lower Trap Unable to test Lower Trap Unable to test   Middle Trap Unable to test Middle Trap Unable to test   Rhomboids Unable to test Rhomboids Unable to test   Unable to test bc patient was not able to get into testing positions    + Hawkin's Liang, empty can test      Joint Mobility: stiffness present with both posterior and inferior glides    Palpation: + TTP upper trap, infraspinatus, pec major, subscap, and delts    Flexibility: limited pec extensibility    Scapula moves as unit with GH joint    CMS Impairment/Limitation/Restriction for FOTO shoulder Survey    Therapist reviewed FOTO scores for Gerda Lai on 8/1/2019.   FOTO documents entered into [employer] - see Media section.    Limitation Score: 45%  Category: Carrying    Current : CK = at least 40% but < 60% impaired, limited or restricted  Goal: CJ = at least 20% but < 40% impaired, limited or restricted         TREATMENT   Treatment Time In: 145 PM  Treatment Time Out: 200 PM  Total Treatment time separate from Evaluation: 15 minutes    Gerda jill  therapeutic exercises to develop strength, endurance, ROM and posture for 15 minutes including:  Shoulder rolls x 20 fed x 20 bkwd  Scap retraction 2 x 10  Wand AAROM ER 15 x 5s  Chin tucks 20 x 5s    Home Exercises and Patient Education Provided    Education provided:   - proper posture    Written Home Exercises Provided: yes.  Exercises were reviewed and Gerda was able to demonstrate them prior to the end of the session.  Gerda demonstrated good  understanding of the education provided.     See EMR under Patient Instructions for exercises provided 8/1/2019.    Assessment   Gerda is a 86 y.o. female referred to outpatient Physical Therapy with a medical diagnosis of R shoulder pain. Pt presents with severe AROM limitations but good PROM present indicating possible muscular-tendon involvement of the RTC. She does have mild limitations with joint mobility and poor body awareness and posture contributing to her pain. Multiple MDs have discussed possible need for surgical intervention; however, she would like to pursue only conservative measures at this time.    Pt prognosis is Fair.   Pt will benefit from skilled outpatient Physical Therapy to address the deficits stated above and in the chart below, provide pt/family education, and to maximize pt's level of independence.     Plan of care discussed with patient: Yes  Pt's spiritual, cultural and educational needs considered and patient is agreeable to the plan of care and goals as stated below:     Anticipated Barriers for therapy: none    Medical Necessity is demonstrated by the following  History  Co-morbidities and personal factors that may impact the plan of care Co-morbidities:   depression, history of cancer, prior hip surgery and arthritis, fibromyalgia     Personal Factors:   age     high   Examination  Body Structures and Functions, activity limitations and participation restrictions that may impact the plan of care Body Regions:   neck  upper  extremities    Body Systems:    gross symmetry  ROM  strength  gross coordinated movement    Participation Restrictions:   Grocery shopping    Activity limitations:   Learning and applying knowledge  no deficits    General Tasks and Commands  no deficits    Communication  no deficits    Mobility  lifting and carrying objects    Self care  washing oneself (bathing, drying, washing hands)  caring for body parts (brushing teeth, shaving, grooming)  toileting  dressing    Domestic Life  shopping  cooking  doing house work (cleaning house, washing dishes, laundry)    Interactions/Relationships  no deficits    Life Areas  no deficits    Community and Social Life  community life         high   Clinical Presentation evolving clinical presentation with changing clinical characteristics moderate   Decision Making/ Complexity Score: moderate     Goals:  Goals:    Short Term Goals:  5 weeks    -  Pt will increase shoulder flexion AROM to at least 100 degrees in order to show improved functional mobility.  -  Pt will increase shoulder abduction AROM to at least 90 degrees in order to show improved functional mobility.  - Pt will be independent and consistent with issued HEP in order to show carryover between therapy sessions.  - Pt. to demonstrate proper cervical and scapula retraction requiring min. to no verbal cues from PT      Long Term Goals: 10 weeks  - Pt will be able to fix her hair with enough shoulder ROM to reach the back of her head and pain at 2/10 or less In order to improve performance of ADLs.  - Pt will be able to put on a bra independently with pain at 2/10 or less in order to improve quality of life.  - Pt will improve FOTO shoulder score to CJ 37% in order to demonstrate an increase in functional mobility and improved ADL performance.  - Pt will be independent with updated HEP to maintain gains following discharge with therapy.  - Pt will be able to lift a 2# object chest height in order to improve  performance of household chores/cleaning.      Plan   Plan of care Certification: 8/1/2019 to 10/10/19.    Outpatient Physical Therapy 2 times weekly for 10 weeks to include the following interventions: Manual Therapy, Moist Heat/ Ice, Neuromuscular Re-ed, Patient Education, Therapeutic Activites and Therapeutic Exercise.     Ashley Holstein, PT

## 2019-08-05 ENCOUNTER — PATIENT OUTREACH (OUTPATIENT)
Dept: OTHER | Facility: OTHER | Age: 84
End: 2019-08-05

## 2019-08-05 NOTE — PROGRESS NOTES
"HPI:  Returned call to Mrs. Gerda Lai to introduce myself as clinician for hypertension digital medicine program. Patient states that she is very overwhelmed with her medications and not sure when or if to take it due to also taking Fosamax."I feel like I worry about my health 90% of the time." Readings this morning were without medications onboard.    Patient reports compliance to medication regimen, rarely missing a dose with complaints of leg swelling and keya horses with nifedipine. She is currently taking Fosamax on an empty stomach then labetalol at 9a and 6p, Tekturna at 11a, nifedipine at 2-3p, spironolactone at 6p. "I feel like I am taking medications all day long." Confirmed that she is drinking a full glass of water with Fosamax and remaining in upright position for 30 mins afterwards.     She is not drinking at least 64 oz of water daily.    Last 5 Patient Entered Readings                                      Current 30 Day Average: 167/78     Recent Readings 8/5/2019 8/5/2019 8/5/2019 8/5/2019 7/30/2019    SBP (mmHg) 194 194 201 201 173    DBP (mmHg) 79 79 83 83 81    Pulse 54 54 57 57 58        Patient admits s/s of dizziness not associated with low readings. Instructed patient to inform me if this occurs, patient confirms understanding.    Patient denies s/s of hypertension (SOB, CP, severe headaches, changes in vision) associated with high readings. Instructed patient to go to the ED if BP >180/110 and accompanied by hypertensive s/s, patient confirms understanding.    Assessment:  Patient's current 30-day average is uncontrolled, above goal of <130/80 mmHg.  Medication schedule: Reviewed current medication schedule with patient. The following adjustments were made to try and alleviate some of her stress and feelings of being overwhelmed - Patient to take spironolactone and labetalol at 9a; Tekturna and nifedipine at 6p; 2nd dose of labetalol at bedtime with her cholesterol medication " Pravastatin.  Counseled on spironolactone: duiretic; must stay well hydrated to prevent dehydration with this medication  Adequate hydration: Patient reports that she is only drinking about 16 oz of water daily. Recommended half of body weight in fluid oz daily - 70 oz where most should be water. For ease, instructed patient to place 8 - 8 oz bottles of water on counter to remind her to drink water.   Reviewed 7/2019 labs - electrolytes good; signs of dehydration, counseled on drinking water again  Charge BP device: Instructed patient to charge BP machine to ensure battery is working properly.     Plan:  Continue current regimen with adjustment to schedule from above.  Focus on drinking more water daily - goal at least 64 oz   Patients health , Celina Calloway, will follow-up as scheduled.    I will continue to monitor regularly and will follow-up in 2 weeks, sooner if blood pressure begins to trend upward or downward.     Current medication regimen:  Hypertension Medications             labetalol (NORMODYNE) 100 MG tablet TAKE 1 AND 1/2 TABLETS BY MOUTH TWICE A DAY    NIFEdipine (PROCARDIA-XL) 90 MG (OSM) 24 hr tablet Take 1 tablet (90 mg total) by mouth once daily.    spironolactone (ALDACTONE) 50 MG tablet TAKE 1 TABLET BY MOUTH EVERY DAY    TEKTURNA 300 mg Tab Take 300 mg by mouth once daily.         Patient has my contact information and knows to call with any concerns or clinical changes.

## 2019-08-06 ENCOUNTER — PATIENT OUTREACH (OUTPATIENT)
Dept: OTHER | Facility: OTHER | Age: 84
End: 2019-08-06

## 2019-08-06 NOTE — PROGRESS NOTES
Last 5 Patient Entered Readings                                      Current 30 Day Average: 167/78     Recent Readings 8/5/2019 8/5/2019 8/5/2019 8/5/2019 7/30/2019    SBP (mmHg) 194 194 201 201 173    DBP (mmHg) 79 79 83 83 81    Pulse 54 54 57 57 58        Called to f/u with patient from phone call yesterday. She took medication as instructed - labetalol and spironolactone this morning. She made a schedule for herself to help stay on track. Reports feeling less overwhelmed today. Will measure BP later today.

## 2019-08-09 NOTE — PROGRESS NOTES
Last 5 Patient Entered Readings                                      Current 30 Day Average: 157/75     Recent Readings 8/8/2019 8/8/2019 8/7/2019 8/7/2019 8/6/2019    SBP (mmHg) 136 136 130 130 131    DBP (mmHg) 77 77 63 63 63    Pulse 63 63 61 61 55        On 8/5/19, patient contacted me trying to get in touch with her clinician, April.  Patient was feeling very overwhelmed by her medications. She's having some side effects from the nifedipine with leg swelling and is also having keya horses. She's concerned because her BP is so high and she's afraid she'll have a stroke. She reports that she hasn't taken her BP medicaitons yet today because she was told she needs to take her fosomax before. Contacted clinician, and she will follow up with patient

## 2019-08-14 NOTE — PROGRESS NOTES
Physical Therapy Daily Treatment Note     Name: Gerda Lai  Clinic Number: [id number]    Therapy Diagnosis:   Encounter Diagnosis   Name Primary?    Decreased ROM of right shoulder      Physician: Demario Sellers MD    Visit Date: 8/15/2019    Physician Orders: PT Eval and Treat   Medical Diagnosis from Referral:   Diagnosis   S46.001A (ICD-10-CM) - Injury of right rotator cuff, initial encounter      Evaluation Date: 8/1/2019  Authorization Period Expiration: 7/1/2020  Plan of Care Expiration: 10/10/19  Visit # / Visits authorized: 1/ 1 ( 2 total)      Time In: 905 AM  Time Out: 1000 AM  Total Billable Time: 30 minutes    Precautions: Standard    Subjective     Pt reports: the exercises have taken away a lot of the pain.She is unable to lift her R arm independently.  She was compliant with home exercise program.  Response to previous treatment: less pain  Functional change: no change    Pain: 2/10  Location: right shoulder      Objective     Gerda received therapeutic exercises to develop strength, endurance, ROM and posture for 40 minutes including:    Supine:  Wand AAROM ER 15 x 5s  Wand AAROM flex 10 x 5s  Wand serratus punch 2 x 10  Chin tucks 20 x 5s    Sitting:  Shoulder rolls x 20 fed x 20 bkwd  Scap retraction 2 x 10  Pulleys 3' scaption  Rows OTB 2 x 10  B shoulder ext OTB 2 x 10  Flexion table slides x 10    Gerda received the following manual therapy techniques: Joint mobilizations were applied to the: R shoulder for 15 minutes, including:  GH post/ing glides grade II  Oscillations  STM pec and subscap      Home Exercises Provided and Patient Education Provided     Education provided:   - importance of strengthening exercises    Written Home Exercises Provided: Patient instructed to cont prior HEP.  Exercises were reviewed and Gerda was able to demonstrate them prior to the end of the session.  Gerda demonstrated good  understanding of the education provided.     See EMR under Patient Instructions  for exercises provided eval.    Assessment     Fair tolerance to initial tx session today. Required frequent cues for technique and to prevent shoulder hiking with activities. Decreased strength noted in R shoulder likely due to two RTC tears. Discussed importance of periscapular strengthening and accessory muscle strengthening to help gain as much functional mobility as able.  Gerda is progressing well towards her goals.   Pt prognosis is Fair.     Pt will continue to benefit from skilled outpatient physical therapy to address the deficits listed in the problem list box on initial evaluation, provide pt/family education and to maximize pt's level of independence in the home and community environment.     Pt's spiritual, cultural and educational needs considered and pt agreeable to plan of care and goals.     Anticipated barriers to physical therapy: none    Goals:     Short Term Goals:  5 weeks     -  Pt will increase shoulder flexion AROM to at least 100 degrees in order to show improved functional mobility.  -  Pt will increase shoulder abduction AROM to at least 90 degrees in order to show improved functional mobility.  - Pt will be independent and consistent with issued HEP in order to show carryover between therapy sessions.  - Pt. to demonstrate proper cervical and scapula retraction requiring min. to no verbal cues from PT        Long Term Goals: 10 weeks  - Pt will be able to fix her hair with enough shoulder ROM to reach the back of her head and pain at 2/10 or less In order to improve performance of ADLs.  - Pt will be able to put on a bra independently with pain at 2/10 or less in order to improve quality of life.  - Pt will improve FOTO shoulder score to CJ 37% in order to demonstrate an increase in functional mobility and improved ADL performance.  - Pt will be independent with updated HEP to maintain gains following discharge with therapy.  - Pt will be able to lift a 2# object chest height in order  to improve performance of household chores/cleaning.    Plan     Cont POC    Ashley Holstein, PT

## 2019-08-15 ENCOUNTER — PATIENT MESSAGE (OUTPATIENT)
Dept: ADMINISTRATIVE | Facility: OTHER | Age: 84
End: 2019-08-15

## 2019-08-15 ENCOUNTER — CLINICAL SUPPORT (OUTPATIENT)
Dept: REHABILITATION | Facility: HOSPITAL | Age: 84
End: 2019-08-15
Attending: FAMILY MEDICINE
Payer: MEDICARE

## 2019-08-15 ENCOUNTER — PATIENT OUTREACH (OUTPATIENT)
Dept: OTHER | Facility: OTHER | Age: 84
End: 2019-08-15

## 2019-08-15 DIAGNOSIS — M25.611 DECREASED ROM OF RIGHT SHOULDER: ICD-10-CM

## 2019-08-15 PROCEDURE — 97140 MANUAL THERAPY 1/> REGIONS: CPT | Mod: PO

## 2019-08-15 PROCEDURE — 97110 THERAPEUTIC EXERCISES: CPT | Mod: PO

## 2019-08-15 NOTE — PROGRESS NOTES
"Last 5 Patient Entered Readings                                      Current 30 Day Average: 157/75     Recent Readings 8/13/2019 8/13/2019 8/8/2019 8/8/2019 8/7/2019    SBP (mmHg) 161 161 136 136 130    DBP (mmHg) 74 74 77 77 63    Pulse 61 61 63 63 61        Patient email with complaint of the following: "I am still getting swelling at my ankles..I  don't think I can use the support hose inasmuch as I don't think I wud be able to es get them on.  I am in rehab for my right arm... I have no strength in it & I know getting those hose on wud be a problem for me.  In addition I believe my calves are somewhat swelling making the my walking more difficult.  I think the nifedipine is the culprit..."  Called patient to check-in. Patient states that in addition to the leg and ankle swelling she is also experiencing dizziness after taking her morning medications labetalol and spironolactone. She is not sure if she is dehydrated but states that "while I may not be drinking quite 64 oz a day, I am drinking far more fluids than I used to. Every time I drink, I feel like I am letting it back out."  Discontinued nifedipine XL 90 mg due to complaint of severe leg and ankle swelling impacting gait with inability to wear support hoses. Patient will measure blood pressure at least once daily for blood pressure monitoring. She will call to alert me if she receives readings higher than 160/80 for medication adjustments - consider nifedipine XL at a lower dose. If blood pressure is fine but s/sx of hypotension continue to occur, consider decreasing dose of Tekturna or spironolactone if adequately hydrated. Continue to emphasize importance of hydration to reduce hypotension 2/2 dehydration from occurring.  Will continue to monitor readings and follow-up tomorrow.    Hypertension Medications             labetalol (NORMODYNE) 100 MG tablet TAKE 1 AND 1/2 TABLETS BY MOUTH TWICE A DAY    NIFEdipine (PROCARDIA-XL) 90 MG (OSM) 24 hr tablet " Take 1 tablet (90 mg total) by mouth once daily.    spironolactone (ALDACTONE) 50 MG tablet TAKE 1 TABLET BY MOUTH EVERY DAY    TEKTURNA 300 mg Tab Take 300 mg by mouth once daily.

## 2019-08-19 ENCOUNTER — PATIENT OUTREACH (OUTPATIENT)
Dept: OTHER | Facility: OTHER | Age: 84
End: 2019-08-19

## 2019-08-19 ENCOUNTER — TELEPHONE (OUTPATIENT)
Dept: PRIMARY CARE CLINIC | Facility: CLINIC | Age: 84
End: 2019-08-19

## 2019-08-19 DIAGNOSIS — I10 HYPERTENSION, ESSENTIAL: ICD-10-CM

## 2019-08-19 RX ORDER — SPIRONOLACTONE 50 MG/1
50 TABLET, FILM COATED ORAL 2 TIMES DAILY
Qty: 60 TABLET | Refills: 7 | Status: SHIPPED | OUTPATIENT
Start: 2019-08-19 | End: 2019-08-23

## 2019-08-19 NOTE — PROGRESS NOTES
"  Physical Therapy Daily Treatment Note     Name: Gerda Lai  Clinic Number: [id number]    Therapy Diagnosis:   Encounter Diagnosis   Name Primary?    Decreased ROM of right shoulder      Physician: Demario Sellers MD    Visit Date: 8/20/2019    Physician Orders: PT Eval and Treat   Medical Diagnosis from Referral:   Diagnosis   S46.001A (ICD-10-CM) - Injury of right rotator cuff, initial encounter      Evaluation Date: 8/1/2019  Authorization Period Expiration: 12/31/19  Plan of Care Expiration: 10/10/19  Visit # / Visits authorized: 2/9 ( 3 total)      Time In: 10:05 am  Time Out: 11:00 am  Total Billable Time: 45 minutes (TE-2, MT-1)    Precautions: Standard    Subjective     Pt reports: she feels rushed in the morning due to all of the medicine she has to take and is overwhelmed over it , stating "I feel like my whole life revolves around my medication".  Pt stated she cant lift her arm on her own and has to use her other arm to help it. Pt stated she has minimal pain until she tries to lift her arm.   She was compliant with home exercise program.  Response to previous treatment: less pain  Functional change: no change    Pain: 2/10  Location: right shoulder      Objective     Gerda received therapeutic exercises to develop strength, endurance, ROM and posture for 40 minutes including:    Supine:  Wand AAROM ER 15 x 5s  Wand AAROM flex 10 x 5s  Wand serratus punch 2 x 10 - (performed w/o dowel)  Chin tucks 20 x 5s    Sitting:  Shoulder rolls x 20 fed x 20 bkwd  Scap retraction 2 x 10   Pulleys 3' scaption  Rows OTB 2 x 10  B shoulder ext OTB 2 x 10  Flexion table slides x 10    Gerda received the following manual therapy techniques: Joint mobilizations were applied to the: R shoulder for 15 minutes, including:  GH post/ing glides grade II  Oscillations  STM pec and subscap      Home Exercises Provided and Patient Education Provided     Education provided:   - importance of strengthening exercises    Written " Home Exercises Provided: Patient instructed to cont prior HEP.  Exercises were reviewed and Gerda was able to demonstrate them prior to the end of the session.  Gerda demonstrated good  understanding of the education provided.     See EMR under Patient Instructions for exercises provided eval.    Assessment     Pt exhibits slightly decreased tolerance to session today due to pain and weakness with most motions in right shoulder. Pt with a good response to manual interventions, denoting pain relief while receiving. Pt required frequent cues for proper form and posture as she exhibited increased upper trap recruitment with exercises . Will continue to progress with periscapular strengthening and ROM per pts tolerance.   Gerda is progressing well towards her goals.   Pt prognosis is Fair.     Pt will continue to benefit from skilled outpatient physical therapy to address the deficits listed in the problem list box on initial evaluation, provide pt/family education and to maximize pt's level of independence in the home and community environment.   Pt's spiritual, cultural and educational needs considered and pt agreeable to plan of care and goals.     Anticipated barriers to physical therapy: none    Goals:     Short Term Goals:  5 weeks     -  Pt will increase shoulder flexion AROM to at least 100 degrees in order to show improved functional mobility.  -  Pt will increase shoulder abduction AROM to at least 90 degrees in order to show improved functional mobility.  - Pt will be independent and consistent with issued HEP in order to show carryover between therapy sessions.  - Pt. to demonstrate proper cervical and scapula retraction requiring min. to no verbal cues from PT        Long Term Goals: 10 weeks  - Pt will be able to fix her hair with enough shoulder ROM to reach the back of her head and pain at 2/10 or less In order to improve performance of ADLs.  - Pt will be able to put on a bra independently with pain  at 2/10 or less in order to improve quality of life.  - Pt will improve FOTO shoulder score to CJ 37% in order to demonstrate an increase in functional mobility and improved ADL performance.  - Pt will be independent with updated HEP to maintain gains following discharge with therapy.  - Pt will be able to lift a 2# object chest height in order to improve performance of household chores/cleaning.    Plan     Cont POC, progress with ROM and strengthening as tolerated.     Alley Mcdermott, PTA

## 2019-08-19 NOTE — PROGRESS NOTES
"Last 5 Patient Entered Readings                                      Current 30 Day Average: 160/74     Recent Readings 8/18/2019 8/18/2019 8/15/2019 8/15/2019 8/13/2019    SBP (mmHg) 166 166 147 147 161    DBP (mmHg) 66 66 68 68 74    Pulse 54 54 69 69 61        Called patient to follow-up on medication changes from 8/15 where nifedipine was discontinued due to complaints of severe leg and ankle swelling making walking difficult; also experiencing dizziness after taking her morning medications labetalol and spironolactone. Patient reports that she feels fine. She has only taken her labetalol thus far this morning. She will take spironolactone within the next hour. Patient is  these medications by an hour or so to prevent dizziness she encounters when both are taken together. She forgot to take blood pressure once daily as promised "because I felt so good! I usually don't worry about my blood pressure if I feel good. And I went to [facility] on Sunday." Reminded her of the need to have daily readings to evaluate her blood pressure and determine if nifedipine should be restarted at a lower dose. She will measure today. Will continue to monitor readings and f/u on Thursday.    Hypertension Medications             labetalol (NORMODYNE) 100 MG tablet TAKE 1 AND 1/2 TABLETS BY MOUTH TWICE A DAY    spironolactone (ALDACTONE) 50 MG tablet Take 1 tablet (50 mg total) by mouth once daily.    TEKTURNA 300 mg Tab Take 300 mg by mouth once daily.               "

## 2019-08-19 NOTE — TELEPHONE ENCOUNTER
Review of pt's BP shows elevated systolics.  Please ask her to increase Spironolactone to one tablet twice daily.  I have sent a new prescription to her pharmacy, thanks

## 2019-08-19 NOTE — TELEPHONE ENCOUNTER
Called patient and informed patient to increase spironolactone. Patient stated that the pill makes her go to the bathroom a lot and dizzy and dehydrated. She also stated that she wasn't sure that Dr. Sellers is aware that she has two nurses to take care of her medications and exercise.

## 2019-08-19 NOTE — PROGRESS NOTES
Last 5 Patient Entered Readings                                      Current 30 Day Average: 160/74     Recent Readings 8/18/2019 8/18/2019 8/15/2019 8/15/2019 8/13/2019    SBP (mmHg) 166 166 147 147 161    DBP (mmHg) 66 66 68 68 74    Pulse 54 54 69 69 61          [facility]: [facility]  Follow Up    Left voicemail to follow up with Ms. Gerda Lai.  Current BP average 157/70 mmHg is not at goal, 130/80  Review technique

## 2019-08-20 ENCOUNTER — CLINICAL SUPPORT (OUTPATIENT)
Dept: REHABILITATION | Facility: HOSPITAL | Age: 84
End: 2019-08-20
Attending: FAMILY MEDICINE
Payer: MEDICARE

## 2019-08-20 DIAGNOSIS — M25.611 DECREASED ROM OF RIGHT SHOULDER: ICD-10-CM

## 2019-08-20 PROCEDURE — 97140 MANUAL THERAPY 1/> REGIONS: CPT | Mod: PO

## 2019-08-20 PROCEDURE — 97110 THERAPEUTIC EXERCISES: CPT | Mod: PO

## 2019-08-21 NOTE — PROGRESS NOTES
Physical Therapy Daily Treatment Note     Name: Gerda Lai  Clinic Number: [id number]    Therapy Diagnosis:   Encounter Diagnosis   Name Primary?    Decreased ROM of right shoulder      Physician: Demario Sellers MD    Visit Date: 8/22/2019    Physician Orders: PT Eval and Treat   Medical Diagnosis from Referral:   Diagnosis   S46.001A (ICD-10-CM) - Injury of right rotator cuff, initial encounter      Evaluation Date: 8/1/2019  Authorization Period Expiration: 12/31/19  Plan of Care Expiration: 10/10/19  Visit # / Visits authorized: 3/9 ( 4 total)      Time In: 1010 AM  Time Out: 11:00 AM  Total Billable Time: 30 minutes (TE-1 MT-1)    Precautions: Standard    Subjective     Pt reports: she feels terrible in the AM likely due to her meds. She cannot get herself together in enough time to get here for 10:00 AM.  She was compliant with home exercise program.  Response to previous treatment: less pain  Functional change: no change    Pain: 2/10  Location: right shoulder      Objective     Gerda received therapeutic exercises to develop strength, endurance, ROM and posture for 40 minutes including:    Supine:  Wand AAROM ER 15 x 5s  Wand AAROM flex 10 x 5s  Wand serratus punch 2 x 10 - (performed w/o dowel)  Chin tucks 20 x 5s    Sitting:  Shoulder rolls x 20 fed x 20 bkwd  Scap retraction 2 x 10   Pulleys 3' scaption  Rows OTB 2 x 10  B shoulder ext OTB 2 x 10  Flexion table slides x 10  Scaption table slides x 10    Gerda received the following manual therapy techniques: Joint mobilizations were applied to the: R shoulder for 15 minutes, including:  GH post/ing glides grade II  Oscillations  STM pec and subscap      Home Exercises Provided and Patient Education Provided     Education provided:   - importance of strengthening exercises    Written Home Exercises Provided: Patient instructed to cont prior HEP.  Exercises were reviewed and Gerda was able to demonstrate them prior to the end of the session.  Gerda  demonstrated good  understanding of the education provided.     See EMR under Patient Instructions for exercises provided eval.    Assessment     Fair tolerance to tx session today with need for breaks between activities due to feeling a little fatigued. Scaption table slides were added without adverse effects. At end of session, pt reported that she became dizzy. Her BP was taken and recorded at 113/43. Pt was put in the supine position with her legs elevated for 10 minutes. She then sat up and drank two cups of water. Her BP was taken again and recorded at 115/56. She was encouraged to call her MD to discuss her low diastolic pressure. She verbalized agreement.     Gerda is progressing well towards her goals.   Pt prognosis is Fair.     Pt will continue to benefit from skilled outpatient physical therapy to address the deficits listed in the problem list box on initial evaluation, provide pt/family education and to maximize pt's level of independence in the home and community environment.   Pt's spiritual, cultural and educational needs considered and pt agreeable to plan of care and goals.     Anticipated barriers to physical therapy: none    Goals:     Short Term Goals:  5 weeks     -  Pt will increase shoulder flexion AROM to at least 100 degrees in order to show improved functional mobility.  -  Pt will increase shoulder abduction AROM to at least 90 degrees in order to show improved functional mobility.  - Pt will be independent and consistent with issued HEP in order to show carryover between therapy sessions.  - Pt. to demonstrate proper cervical and scapula retraction requiring min. to no verbal cues from PT        Long Term Goals: 10 weeks  - Pt will be able to fix her hair with enough shoulder ROM to reach the back of her head and pain at 2/10 or less In order to improve performance of ADLs.  - Pt will be able to put on a bra independently with pain at 2/10 or less in order to improve quality of  life.  - Pt will improve FOTO shoulder score to CJ 37% in order to demonstrate an increase in functional mobility and improved ADL performance.  - Pt will be independent with updated HEP to maintain gains following discharge with therapy.  - Pt will be able to lift a 2# object chest height in order to improve performance of household chores/cleaning.    Plan     Cont POC, progress with ROM and strengthening as tolerated.     Ashley Holstein, PT

## 2019-08-22 ENCOUNTER — PATIENT OUTREACH (OUTPATIENT)
Dept: OTHER | Facility: OTHER | Age: 84
End: 2019-08-22

## 2019-08-22 ENCOUNTER — CLINICAL SUPPORT (OUTPATIENT)
Dept: REHABILITATION | Facility: HOSPITAL | Age: 84
End: 2019-08-22
Attending: FAMILY MEDICINE
Payer: MEDICARE

## 2019-08-22 DIAGNOSIS — M25.611 DECREASED ROM OF RIGHT SHOULDER: ICD-10-CM

## 2019-08-22 PROCEDURE — 97110 THERAPEUTIC EXERCISES: CPT | Mod: PO

## 2019-08-22 PROCEDURE — 97140 MANUAL THERAPY 1/> REGIONS: CPT | Mod: PO

## 2019-08-22 NOTE — PROGRESS NOTES
Last 5 Patient Entered Readings                                      Current 30 Day Average: 159/74     Recent Readings 8/20/2019 8/20/2019 8/19/2019 8/19/2019 8/19/2019    SBP (mmHg) 143 143 171 171 178    DBP (mmHg) 61 61 75 75 72    Pulse 55 55 56 56 59        Returning missed phone call from patient. She reports feeling lightheaded and weak at Physical Therapy this morning. They measured a blood pressure of 113/43 and 115/56. She was calling to determine how to take her blood pressure meds for the day. She is not drinking 64 ounces of fluids but drinking as much as she can. Counseled her to continue drinking fluids throughout the day. Re-measure blood pressure prior to bedtime. If less than 120/80, do not take blood pressure medications. If greater than 140/90, take labetalol only. Continue to monitor readings. Follow-up tomorrow to see how she is doing.    Hypertension Medications             labetalol (NORMODYNE) 100 MG tablet TAKE 1 AND 1/2 TABLETS BY MOUTH TWICE A DAY    spironolactone (ALDACTONE) 50 MG tablet Take 1 tablet (50 mg total) by mouth 2 (two) times daily.    TEKTURNA 300 mg Tab Take 300 mg by mouth once daily.

## 2019-08-23 ENCOUNTER — PATIENT OUTREACH (OUTPATIENT)
Dept: OTHER | Facility: OTHER | Age: 84
End: 2019-08-23

## 2019-08-23 DIAGNOSIS — I10 HYPERTENSION, ESSENTIAL: ICD-10-CM

## 2019-08-23 RX ORDER — SPIRONOLACTONE 50 MG/1
50 TABLET, FILM COATED ORAL DAILY
Qty: 60 TABLET | Refills: 7 | Status: SHIPPED | OUTPATIENT
Start: 2019-08-23 | End: 2019-09-18 | Stop reason: DRUGHIGH

## 2019-08-23 NOTE — PROGRESS NOTES
Last 5 Patient Entered Readings                                      Current 30 Day Average: 158/72     Recent Readings 8/23/2019 8/23/2019 8/22/2019 8/22/2019 8/20/2019    SBP (mmHg) 160 160 162 162 143    DBP (mmHg) 75 75 67 67 61    Pulse 57 57 61 61 55        Mrs. Lorenz called to check-in after episode of hypotension yesterday at Physical Therapy. She did not take any additional medication yesterday evening after her morning dose of spironolactone. She also believes that taking the Tekturna later than usual may have attributed to her episode of hypotension yesterday morning as she took it 6 hours before taking morning medication spironolactone. Blood pressure this morning is 160/75, pulse 57. She has not taken any medications yet. She called to ask what should she take. Advised her to take spironolactone 50 mg. Hold labetalol due to pulse being less than 60. If no episodes of hypotension today, okay to take Tekturna tonight. Will continue to monitor readings and follow-up on Monday. Patient is instructed to call if needed.     Hypertension Medications             labetalol (NORMODYNE) 100 MG tablet TAKE 1 AND 1/2 TABLETS BY MOUTH TWICE A DAY    spironolactone (ALDACTONE) 50 MG tablet Take 1 tablet (50 mg total) by mouth once daily.    TEKTURNA 300 mg Tab Take 300 mg by mouth once daily.

## 2019-08-24 RX ORDER — ALISKIREN HEMIFUMARATE 300 MG/1
TABLET, FILM COATED ORAL
Qty: 90 TABLET | Refills: 2 | Status: SHIPPED | OUTPATIENT
Start: 2019-08-24 | End: 2020-04-26 | Stop reason: SDUPTHER

## 2019-08-26 ENCOUNTER — CLINICAL SUPPORT (OUTPATIENT)
Dept: REHABILITATION | Facility: HOSPITAL | Age: 84
End: 2019-08-26
Attending: FAMILY MEDICINE
Payer: MEDICARE

## 2019-08-26 ENCOUNTER — PATIENT OUTREACH (OUTPATIENT)
Dept: OTHER | Facility: OTHER | Age: 84
End: 2019-08-26

## 2019-08-26 DIAGNOSIS — M25.611 DECREASED ROM OF RIGHT SHOULDER: ICD-10-CM

## 2019-08-26 PROCEDURE — 97110 THERAPEUTIC EXERCISES: CPT | Mod: PO

## 2019-08-26 PROCEDURE — 97140 MANUAL THERAPY 1/> REGIONS: CPT | Mod: PO

## 2019-08-26 NOTE — PROGRESS NOTES
"Last 5 Patient Entered Readings                                      Current 30 Day Average: 159/72     Recent Readings 8/26/2019 8/26/2019 8/24/2019 8/24/2019 8/24/2019    SBP (mmHg) 188 188 168 168 203    DBP (mmHg) 78 78 71 71 85    Pulse 69 69 70 70 69        Called patient for hypertension follow-up from Friday. She reports "I feel fine but my blood pressure reading was scotty high. Today is my Fosamax day so the measure recorded today is without my blood pressure medication." She explained that Fosamax throws her off when it has to be taken due to the requirement of taking the drug at least 30 mins before the first food, beverage or medications of the day. Blood pressure medication was taken around 11a. Requested that she hang up to measure her blood pressure and I would call her back. Measured blood pressure with spironolactone onboard is 168/70. She also reports that she has not taken labetalol since Thursday when it was held due to her episode of hypotension during physical therapy. She has only been taking spironolactone and Tekturna since Thursday. She was uncomfortable taking all of her medications after Thursday's episode. Denies any s/sx from not taking labetalol over past 3 days. Instructed her to take half-tablet (50 mg) labetalol and Tekturna tonight. Will continue to monitor and follow-up tomorrow.    Hypertension Medications             labetalol (NORMODYNE) 100 MG tablet TAKE 1 AND 1/2 TABLETS BY MOUTH TWICE A DAY    spironolactone (ALDACTONE) 50 MG tablet Take 1 tablet (50 mg total) by mouth once daily.    TEKTURNA 300 mg Tab Take 300 mg by mouth once daily.           "

## 2019-08-26 NOTE — PROGRESS NOTES
Physical Therapy Daily Treatment Note     Name: Gerda Lai  Clinic Number: [id number]    Therapy Diagnosis:   Encounter Diagnosis   Name Primary?    Decreased ROM of right shoulder      Physician: Demario Sellers MD    Visit Date: 8/26/2019    Physician Orders: PT Eval and Treat   Medical Diagnosis from Referral:   Diagnosis   S46.001A (ICD-10-CM) - Injury of right rotator cuff, initial encounter      Evaluation Date: 8/1/2019  Authorization Period Expiration: 12/31/19  Plan of Care Expiration: 10/10/19  Visit # / Visits authorized: 4/9 ( 5 total)      Time In: 4:05 pm  Time Out: 5:00 pm  Total Billable Time: 45 minutes (TE-2, MT-1)    Precautions: Standard    Subjective     Pt reports: she is feeling better today and her blood pressure is more controled. She is on a blood pressure program and is being monitered every time she takes her blood pressure, her nurses tweak her meds as needed. Pt stated her R shoulder is feling better then it was before starting therapy, but she still cant raise it without help.     She was compliant with home exercise program.  Response to previous treatment: less pain  Functional change: no change    Pain: 0/10  Location: right shoulder      Objective     BP taken manually upon arrival : 160/70     Gerda received therapeutic exercises to develop strength, endurance, ROM and posture for 40 minutes including:    Supine:  Wand AAROM ER 15 x 5s  Wand AAROM flex 10 x 5s - NP due to pain  Wand serratus punch 2 x 10 - (performed w/o dowel)  Chin tucks 20 x 5s    Sitting:  Shoulder rolls x 20 fed x 20 bkwd  Scap retraction 2 x 10   Pulleys 3' scaption  Rows OTB 2 x 10  B shoulder ext OTB 2 x 10  Flexion table slides x 10  Scaption table slides x 10    Gerda received the following manual therapy techniques: Joint mobilizations were applied to the: R shoulder for 15 minutes, including:  GH post/ing glides grade II  Oscillations  STM pec and subscap    BP taken manually post tx :  164/68      Home Exercises Provided and Patient Education Provided     Education provided:   - importance of strengthening exercises    Written Home Exercises Provided: Patient instructed to cont prior HEP.  Exercises were reviewed and Gerda was able to demonstrate them prior to the end of the session.  Gerda demonstrated good  understanding of the education provided.     See EMR under Patient Instructions for exercises provided eval.    Assessment     Pt demonstrated a good tolerance to session today. Pts BP was monitored and remained controlled throughout session.  Pt exhibited increased pain with dowel flexion and was unable to complete due to pain and weakness, also compensating by bending elbows. Discussed main possible cause of pts weakness, due to two full thickness rotator cuff tears, as she was upset and concerned over not being able to lift her arm on her own.      Gerda is progressing well towards her goals.   Pt prognosis is Fair.     Pt will continue to benefit from skilled outpatient physical therapy to address the deficits listed in the problem list box on initial evaluation, provide pt/family education and to maximize pt's level of independence in the home and community environment.   Pt's spiritual, cultural and educational needs considered and pt agreeable to plan of care and goals.     Anticipated barriers to physical therapy: none    Goals:     Short Term Goals:  5 weeks     -  Pt will increase shoulder flexion AROM to at least 100 degrees in order to show improved functional mobility.  -  Pt will increase shoulder abduction AROM to at least 90 degrees in order to show improved functional mobility.  - Pt will be independent and consistent with issued HEP in order to show carryover between therapy sessions.  - Pt. to demonstrate proper cervical and scapula retraction requiring min. to no verbal cues from PT        Long Term Goals: 10 weeks  - Pt will be able to fix her hair with enough shoulder  ROM to reach the back of her head and pain at 2/10 or less In order to improve performance of ADLs.  - Pt will be able to put on a bra independently with pain at 2/10 or less in order to improve quality of life.  - Pt will improve FOTO shoulder score to CJ 37% in order to demonstrate an increase in functional mobility and improved ADL performance.  - Pt will be independent with updated HEP to maintain gains following discharge with therapy.  - Pt will be able to lift a 2# object chest height in order to improve performance of household chores/cleaning.    Plan     Cont POC, progress with ROM and strengthening as tolerated.     Alley Mcdermott, PTA

## 2019-08-28 ENCOUNTER — PATIENT OUTREACH (OUTPATIENT)
Dept: OTHER | Facility: OTHER | Age: 84
End: 2019-08-28

## 2019-08-28 NOTE — PROGRESS NOTES
Last 5 Patient Entered Readings                                      Current 30 Day Average: 157/72     Recent Readings 8/27/2019 8/27/2019 8/26/2019 8/26/2019 8/26/2019    SBP (mmHg) 157 157 162 162 168    DBP (mmHg) 67 67 68 68 70    Pulse 68 68 71 71 68        Called patient for hypertension check-in to confirm she was back on track with taking all of her medications. She had an episode of hypotension last week on Thursday at PT where we made modifications to her blood pressure medication. On Monday, she reported that she had not taken labetalol since Thursday because she was not comfortable taking all of her medications after the episode. Instructed her to take half- tablet of labetalol BID, spironolactone 50 mg and Tekturna 300 mg. Per Mrs. Lorenz, she did not take her labetalol half-tablet on yesterday or today as she was uncertain if she should; however, she did take spironolactone and Tekturna as prescribed. Reviewed medications with her again to clarify regimen. Patient expressed understanding with repeat back. Overall, she reports that she is really feeling much better and will take her blood pressure after taking half-tablet labetalol. She will call if she has any concerns or blood pressure is higher than usual. Continue to monitor readings and follow-up.     Hypertension Medications             labetalol (NORMODYNE) 100 MG tablet TAKE 1 AND 1/2 TABLETS BY MOUTH TWICE A DAY Patient taking differently: Take half-tablet (50 mg) by mouth twice daily    spironolactone (ALDACTONE) 50 MG tablet Take 1 tablet (50 mg total) by mouth once daily.    TEKTURNA 300 mg Tab Take 300 mg by mouth once daily.     TEKTURNA 300 mg Tab TAKE 1 TABLET (300 MG TOTAL) BY MOUTH ONCE DAILY.

## 2019-08-29 ENCOUNTER — CLINICAL SUPPORT (OUTPATIENT)
Dept: REHABILITATION | Facility: HOSPITAL | Age: 84
End: 2019-08-29
Attending: FAMILY MEDICINE
Payer: MEDICARE

## 2019-08-29 DIAGNOSIS — M25.611 DECREASED ROM OF RIGHT SHOULDER: ICD-10-CM

## 2019-08-29 PROCEDURE — 97110 THERAPEUTIC EXERCISES: CPT | Mod: PO

## 2019-08-29 PROCEDURE — 97140 MANUAL THERAPY 1/> REGIONS: CPT | Mod: PO

## 2019-08-29 NOTE — PROGRESS NOTES
Physical Therapy Daily Treatment Note     Name: Gerda Lai  Clinic Number: [id number]    Therapy Diagnosis:   Encounter Diagnosis   Name Primary?    Decreased ROM of right shoulder      Physician: Demario eSllers MD    Visit Date: 8/29/2019    Physician Orders: PT Eval and Treat   Medical Diagnosis from Referral:   Diagnosis   S46.001A (ICD-10-CM) - Injury of right rotator cuff, initial encounter      Evaluation Date: 8/1/2019  Authorization Period Expiration: 12/31/19  Plan of Care Expiration: 10/10/19  Visit # / Visits authorized: 5/9 ( 5 total)      Time In: 1108 AM  Time Out: 1200 PM  Total Billable Time:  52 minutes (TE-2, MT-1)    Precautions: Standard    Subjective     Pt reports: she has been doing better overall with her blood pressure.  She states her shoulder is achy prior to session today. Not unbearable but still uncomfortable.  She was compliant with home exercise program.  Response to previous treatment: less pain  Functional change: no change    Pain: 0/10  Location: right shoulder      Objective     Gerda received therapeutic exercises to develop strength, endurance, ROM and posture for 40 minutes including:    Supine:  Wand AAROM ER 15 x 5s  Serratus punch 2 x 10 - (performed w/o dowel)  Chin tucks 20 x 5s  Wand AAROM flex 10 x 5s - NP due to pain  SL ER with therapist assist 2 x 10  SL shoulder flex x 10 therapist assist    Sitting:  Shoulder rolls x 20 fed x 20 bkwd  Scap retraction 2 x 10   Pulleys 3' scaption  Rows OTB 2 x 10  B shoulder ext OTB 2 x 10  Flexion table slides x 10  Scaption table slides x 10    Standing:  IR/ER walkouts 10 x 5s    Gerda received the following manual therapy techniques: Joint mobilizations were applied to the: R shoulder for 15 minutes, including:  GH post/ing glides grade II  Oscillations  STM pec and subscap    Home Exercises Provided and Patient Education Provided     Education provided:   - importance of strengthening exercises    Written Home Exercises  Provided: Patient instructed to cont prior HEP.  Exercises were reviewed and Gerda was able to demonstrate them prior to the end of the session.  Gerda demonstrated good  understanding of the education provided.     See EMR under Patient Instructions for exercises provided eval.    Assessment     Pt demonstrated a good tolerance to session today. Required cueing and therapist assist to perform most activities correctly with good technique. Pt very limited due to RTC tears; however, discussed periscapular strengthening and accessory muscle strengthening to help preserve functional mobility. Discussed main possible cause of pts weakness, due to two full thickness rotator cuff tears, as she was upset and concerned over not being able to lift her arm on her own.      Gerda is progressing well towards her goals.   Pt prognosis is Fair.     Pt will continue to benefit from skilled outpatient physical therapy to address the deficits listed in the problem list box on initial evaluation, provide pt/family education and to maximize pt's level of independence in the home and community environment.   Pt's spiritual, cultural and educational needs considered and pt agreeable to plan of care and goals.     Anticipated barriers to physical therapy: none    Goals:     Short Term Goals:  5 weeks     -  Pt will increase shoulder flexion AROM to at least 100 degrees in order to show improved functional mobility.  -  Pt will increase shoulder abduction AROM to at least 90 degrees in order to show improved functional mobility.  - Pt will be independent and consistent with issued HEP in order to show carryover between therapy sessions.  - Pt. to demonstrate proper cervical and scapula retraction requiring min. to no verbal cues from PT        Long Term Goals: 10 weeks  - Pt will be able to fix her hair with enough shoulder ROM to reach the back of her head and pain at 2/10 or less In order to improve performance of ADLs.  - Pt will be  able to put on a bra independently with pain at 2/10 or less in order to improve quality of life.  - Pt will improve FOTO shoulder score to CJ 37% in order to demonstrate an increase in functional mobility and improved ADL performance.  - Pt will be independent with updated HEP to maintain gains following discharge with therapy.  - Pt will be able to lift a 2# object chest height in order to improve performance of household chores/cleaning.    Plan     Cont POC, progress with ROM and strengthening as tolerated.     Ashley Holstein, PT

## 2019-08-31 RX ORDER — LORAZEPAM 0.5 MG/1
0.25 TABLET ORAL 2 TIMES DAILY
Qty: 30 TABLET | Refills: 0 | Status: SHIPPED | OUTPATIENT
Start: 2019-08-31 | End: 2019-10-15 | Stop reason: SDUPTHER

## 2019-09-03 ENCOUNTER — CLINICAL SUPPORT (OUTPATIENT)
Dept: REHABILITATION | Facility: HOSPITAL | Age: 84
End: 2019-09-03
Attending: FAMILY MEDICINE
Payer: MEDICARE

## 2019-09-03 ENCOUNTER — DOCUMENTATION ONLY (OUTPATIENT)
Dept: REHABILITATION | Facility: HOSPITAL | Age: 84
End: 2019-09-03

## 2019-09-03 DIAGNOSIS — M25.611 DECREASED ROM OF RIGHT SHOULDER: ICD-10-CM

## 2019-09-03 PROCEDURE — 97110 THERAPEUTIC EXERCISES: CPT | Mod: PO

## 2019-09-03 PROCEDURE — 97140 MANUAL THERAPY 1/> REGIONS: CPT | Mod: PO

## 2019-09-03 NOTE — PROGRESS NOTES
PT/PTA met face to face to discuss pt's treatment plan and progress towards established goals. Pt will be seen by a physical therapist minimally every 6th visit or every 30 days.      Alley Mcdermott PTA

## 2019-09-03 NOTE — PROGRESS NOTES
Physical Therapy Daily Treatment Note     Name: Gerda Lai  Clinic Number: [id number]    Therapy Diagnosis:   Encounter Diagnosis   Name Primary?    Decreased ROM of right shoulder      Physician: Demario Sellers MD    Visit Date: 9/3/2019    Physician Orders: PT Eval and Treat   Medical Diagnosis from Referral:   Diagnosis   S46.001A (ICD-10-CM) - Injury of right rotator cuff, initial encounter      Evaluation Date: 8/1/2019  Authorization Period Expiration: 12/31/19  Plan of Care Expiration: 10/10/19  Visit # / Visits authorized: 6/9 ( 7 total)      Time In: 1:00 pm  Time Out: 2:00 pm  Total Billable Time: 60 minutes (TE-3, MT-1)    Precautions: Standard    Subjective     Pt reports: her shoulder is'nt bothering her too much today but her whole body is just hurting overall , especially her legs and knees. Pt stated she thinks it might be all the changes in her blood pressure medicine although is'nt really sure. Pt stated having issues controlling her blood pressure again this past weekend.     She was compliant with home exercise program.  Response to previous treatment: less pain  Functional change: no change    Pain: 0/10  Location: right shoulder      Objective     Gerda received therapeutic exercises to develop strength, endurance, ROM and posture for 45 minutes including:    Supine:  Wand AAROM ER 15 x 5s  Serratus punch 2 x 10 - (performed w/o dowel)  Chin tucks 20 x 5s  Wand AAROM flex 10 x 5s - NP due to pain  SL ER with therapist assist 2 x 10  SL shoulder flex 2 x 10 therapist assist    Sitting:  Shoulder rolls x 20 fed x 20 bkwd  Scap retraction 2 x 10   Pulleys 3' scaption  Rows OTB 2 x 10  B shoulder ext OTB 2 x 10  Flexion table slides x 10  Scaption table slides x 10    Standing:  IR/ER walkouts 10 x 5s    Gerda received the following manual therapy techniques: Joint mobilizations were applied to the: R shoulder for 15 minutes, including:  GH post/ing glides grade II  Oscillations  STM pec and  "subscap    Home Exercises Provided and Patient Education Provided     Education provided:   - importance of strengthening exercises    Written Home Exercises Provided: Patient instructed to cont prior HEP.  Exercises were reviewed and Gerda was able to demonstrate them prior to the end of the session.  Gerda demonstrated good  understanding of the education provided.     See EMR under Patient Instructions for exercises provided eval.    Assessment     Pt exhibited a good tolerance to session today . Pt demonstrated slight improved tolerance to exercises and ability to flex her arm in supine with less difficulty stating "attila been practicing this a lot at home". Pt denotes tightness under her arm and feeling a good stretch with table slides. Pt with significant difficulty maintaining proper position for ER/IR walkouts and cues required throughout.      Gerda is progressing well towards her goals.   Pt prognosis is Fair.     Pt will continue to benefit from skilled outpatient physical therapy to address the deficits listed in the problem list box on initial evaluation, provide pt/family education and to maximize pt's level of independence in the home and community environment.   Pt's spiritual, cultural and educational needs considered and pt agreeable to plan of care and goals.     Anticipated barriers to physical therapy: none    Goals:     Short Term Goals:  5 weeks     -  Pt will increase shoulder flexion AROM to at least 100 degrees in order to show improved functional mobility.  -  Pt will increase shoulder abduction AROM to at least 90 degrees in order to show improved functional mobility.  - Pt will be independent and consistent with issued HEP in order to show carryover between therapy sessions.  - Pt. to demonstrate proper cervical and scapula retraction requiring min. to no verbal cues from PT        Long Term Goals: 10 weeks  - Pt will be able to fix her hair with enough shoulder ROM to reach the back of " her head and pain at 2/10 or less In order to improve performance of ADLs.  - Pt will be able to put on a bra independently with pain at 2/10 or less in order to improve quality of life.  - Pt will improve FOTO shoulder score to CJ 37% in order to demonstrate an increase in functional mobility and improved ADL performance.  - Pt will be independent with updated HEP to maintain gains following discharge with therapy.  - Pt will be able to lift a 2# object chest height in order to improve performance of household chores/cleaning.    Plan     Cont POC, progress with ROM and strengthening as tolerated.     Alley Mcdermott, PTA

## 2019-09-05 ENCOUNTER — CLINICAL SUPPORT (OUTPATIENT)
Dept: REHABILITATION | Facility: HOSPITAL | Age: 84
End: 2019-09-05
Attending: FAMILY MEDICINE
Payer: MEDICARE

## 2019-09-05 DIAGNOSIS — M25.611 DECREASED ROM OF RIGHT SHOULDER: ICD-10-CM

## 2019-09-05 PROCEDURE — 97140 MANUAL THERAPY 1/> REGIONS: CPT | Mod: PO

## 2019-09-05 PROCEDURE — 97110 THERAPEUTIC EXERCISES: CPT | Mod: PO

## 2019-09-05 NOTE — PROGRESS NOTES
Physical Therapy Daily Treatment Note     Name: Greda Lai  Clinic Number: [id number]    Therapy Diagnosis:   Encounter Diagnosis   Name Primary?    Decreased ROM of right shoulder      Physician: Demario Sellers MD    Visit Date: 9/5/2019    Physician Orders: PT Eval and Treat   Medical Diagnosis from Referral:   Diagnosis   S46.001A (ICD-10-CM) - Injury of right rotator cuff, initial encounter      Evaluation Date: 8/1/2019  Authorization Period Expiration: 12/31/19  Plan of Care Expiration: 10/10/19  Visit # / Visits authorized: 7/9 ( 8 total)      Time In: 1:00 pm  Time Out: 2:00 pm  Total Billable Time: 30 minutes (TE-1, MT-1)    Precautions: Standard    Subjective     Pt reports: she is having a lot of pain in her hand from her carpel tunnel , stating it is about an 8/10. Pt stated her shoulder is bothering her a little bit today .     She was compliant with home exercise program.  Response to previous treatment: less pain  Functional change: no change    Pain: 3/10  Location: right shoulder      Objective     Gerda received therapeutic exercises to develop strength, endurance, ROM and posture for 45 minutes including:    Supine:  Wand AAROM ER 15 x 5s  Serratus punch 2 x 10 - (performed w/o dowel)  Chin tucks 20 x 5s  Wand AAROM flex 10 x 5s - NP due to pain  SL ER with therapist assist 2 x 10  SL shoulder flex 2 x 10 therapist assist    Sitting:  Shoulder rolls x 20 fed x 20 bkwd  Scap retraction 2 x 10   Pulleys 3' scaption  Rows OTB 2 x 10  B shoulder ext GTB 2 x 10  Flexion table slides x 10  Scaption table slides x 10    Standing:  IR/ER walkouts 10 x 5s    Gerda received the following manual therapy techniques: Joint mobilizations were applied to the: R shoulder for 15 minutes, including:  GH post/ing glides grade II  Oscillations  STM pec and subscap    Home Exercises Provided and Patient Education Provided     Education provided:   - importance of strengthening exercises    Written Home Exercises  Provided: Patient instructed to cont prior HEP.  Exercises were reviewed and Gerda was able to demonstrate them prior to the end of the session.  Gerda demonstrated good  understanding of the education provided.     See EMR under Patient Instructions for exercises provided eval.    Assessment     Pt presents with main c/o pain in her hand due to a recent flare up of carpel tunnel. Pt exhibits increased tenderness and fatigue reported in her posterior proximal arm. Improvements in stability noted while performing IR/ER walkouts today . Post tx pain rated 2/10 , reporting mainly feeling sore and fatigued. Pt advised to ice and rest today and continue with performing shortened HEP tomorrow if her soreness has subsided.      Gerda is progressing well towards her goals.   Pt prognosis is Fair.     Pt will continue to benefit from skilled outpatient physical therapy to address the deficits listed in the problem list box on initial evaluation, provide pt/family education and to maximize pt's level of independence in the home and community environment.   Pt's spiritual, cultural and educational needs considered and pt agreeable to plan of care and goals.     Anticipated barriers to physical therapy: none    Goals:     Short Term Goals:  5 weeks     -  Pt will increase shoulder flexion AROM to at least 100 degrees in order to show improved functional mobility.  -  Pt will increase shoulder abduction AROM to at least 90 degrees in order to show improved functional mobility.  - Pt will be independent and consistent with issued HEP in order to show carryover between therapy sessions.  - Pt. to demonstrate proper cervical and scapula retraction requiring min. to no verbal cues from PT     Long Term Goals: 10 weeks  - Pt will be able to fix her hair with enough shoulder ROM to reach the back of her head and pain at 2/10 or less In order to improve performance of ADLs.  - Pt will be able to put on a bra independently with pain  at 2/10 or less in order to improve quality of life.  - Pt will improve FOTO shoulder score to CJ 37% in order to demonstrate an increase in functional mobility and improved ADL performance.  - Pt will be independent with updated HEP to maintain gains following discharge with therapy.  - Pt will be able to lift a 2# object chest height in order to improve performance of household chores/cleaning.    Plan     Cont POC, progress with ROM and strengthening as tolerated.     Alley Mcdermott, PTA

## 2019-09-09 ENCOUNTER — CLINICAL SUPPORT (OUTPATIENT)
Dept: REHABILITATION | Facility: HOSPITAL | Age: 84
End: 2019-09-09
Attending: FAMILY MEDICINE
Payer: MEDICARE

## 2019-09-09 DIAGNOSIS — M25.611 DECREASED ROM OF RIGHT SHOULDER: ICD-10-CM

## 2019-09-09 PROCEDURE — 97140 MANUAL THERAPY 1/> REGIONS: CPT | Mod: PO

## 2019-09-09 PROCEDURE — 97110 THERAPEUTIC EXERCISES: CPT | Mod: PO

## 2019-09-09 NOTE — PROGRESS NOTES
Physical Therapy Daily Treatment Note     Name: Gerda Lai  Clinic Number: [id number]    Therapy Diagnosis:   Encounter Diagnosis   Name Primary?    Decreased ROM of right shoulder      Physician: Demario Sellers MD    Visit Date: 9/9/2019    Physician Orders: PT Eval and Treat   Medical Diagnosis from Referral:   Diagnosis   S46.001A (ICD-10-CM) - Injury of right rotator cuff, initial encounter      Evaluation Date: 8/1/2019  Authorization Period Expiration: 12/31/19  Plan of Care Expiration: 10/10/19  Visit # / Visits authorized: 8/9 ( 9 total)      Time In: 2:00 pm  Time Out: 3:00 pm  Total Billable Time: 30 minutes (TE-1, MT-1)    Precautions: Standard    Subjective     Pt reports: she feels like her shoulder is doing better today although she still cant lift it on her own.     She was compliant with home exercise program.  Response to previous treatment: less pain  Functional change: no change    Pain: 3/10  Location: right shoulder      Objective     Gerda received therapeutic exercises to develop strength, endurance, ROM and posture for 45 minutes including:    Supine:  Wand AAROM ER 15 x 5s  Serratus punch 2 x 10 - (performed w/o dowel)  Chin tucks 20 x 5s  Wand AAROM flex 10 x 5s - NP due to pain  SL ER with therapist assist 2 x 10  SL shoulder flex 2 x 10 therapist assist    Sitting:  Shoulder rolls x 20 fed x 20 bkwd  Scap retraction 2 x 10   Pulleys 3' scaption  Rows GTB 2 x 10  B shoulder ext OTB 2 x 10   Flexion table slides x 10  Scaption table slides x 10    Standing:  IR/ER walkouts 10 x 5s    Gerda received the following manual therapy techniques: Joint mobilizations were applied to the: R shoulder for 15 minutes, including:  GH post/ing glides grade II  Oscillations  STM pec and subscap    Home Exercises Provided and Patient Education Provided     Education provided:   - importance of strengthening exercises    Written Home Exercises Provided: Patient instructed to cont prior HEP.  Exercises  were reviewed and Gerda was able to demonstrate them prior to the end of the session.  Gerda demonstrated good  understanding of the education provided.     See EMR under Patient Instructions for exercises provided eval.    Assessment     Pt exhibited a good tolerance to session today and was able to complete with no adverse effects . Pt exhibits good ROM on the pulleys reaching about 135 degrees of scaption with no pain. Pt continues to require assist for active ROM activities although with slight improvements noted with this.     Gerda is progressing well towards her goals.   Pt prognosis is Fair.     Pt will continue to benefit from skilled outpatient physical therapy to address the deficits listed in the problem list box on initial evaluation, provide pt/family education and to maximize pt's level of independence in the home and community environment.   Pt's spiritual, cultural and educational needs considered and pt agreeable to plan of care and goals.     Anticipated barriers to physical therapy: none    Goals:     Short Term Goals:  5 weeks     -  Pt will increase shoulder flexion AROM to at least 100 degrees in order to show improved functional mobility.  -  Pt will increase shoulder abduction AROM to at least 90 degrees in order to show improved functional mobility.  - Pt will be independent and consistent with issued HEP in order to show carryover between therapy sessions.  - Pt. to demonstrate proper cervical and scapula retraction requiring min. to no verbal cues from PT     Long Term Goals: 10 weeks  - Pt will be able to fix her hair with enough shoulder ROM to reach the back of her head and pain at 2/10 or less In order to improve performance of ADLs.  - Pt will be able to put on a bra independently with pain at 2/10 or less in order to improve quality of life.  - Pt will improve FOTO shoulder score to CJ 37% in order to demonstrate an increase in functional mobility and improved ADL performance.  -  Pt will be independent with updated HEP to maintain gains following discharge with therapy.  - Pt will be able to lift a 2# object chest height in order to improve performance of household chores/cleaning.    Plan     Cont POC, progress with ROM and strengthening as tolerated.     Alley Mcdermott, PTA

## 2019-09-11 NOTE — PROGRESS NOTES
Physical Therapy Daily Treatment Note     Name: Gerda Lai  Clinic Number: [id number]    Therapy Diagnosis:   Encounter Diagnosis   Name Primary?    Decreased ROM of right shoulder      Physician: Demario Sellers MD    Visit Date: 9/12/2019    Physician Orders: PT Eval and Treat   Medical Diagnosis from Referral:   Diagnosis   S46.001A (ICD-10-CM) - Injury of right rotator cuff, initial encounter      Evaluation Date: 8/1/2019  Authorization Period Expiration: 12/31/19  Plan of Care Expiration: 10/10/19  Visit # / Visits authorized: 9/9 ( 10 total)      Time In: 2:10 PM  Time Out: 3:00 pm  Total Billable Time: 30 minutes (TE-1, MT-1)    Precautions: Standard    Subjective     Pt reports: does have trouble raising her arm, but her pain is reduced significantly.    She was compliant with home exercise program.  Response to previous treatment: less pain  Functional change: no change    Pain: 1/10  Location: right shoulder      Objective     Reassess:     Active Range of Motion:   Shoulder Right   Flexion 33   Abduction 22   ER at 0 40   ER at 90 unable   IR T8      Unable to hold AROM for any amount of time     Passive Range of Motion:   Shoulder Right   Flexion 165   Abduction 180   ER at 0 70   ER at 90 NT   IR 83        Gerda received therapeutic exercises to develop strength, endurance, ROM and posture for 30 minutes including: reassess    Supine:  Wand AAROM ER 15 x 5s  Serratus punch 2 x 10 - (performed w/o dowel)  Chin tucks 20 x 5s  Wand AAROM flex 10 x 5s - NP due to pain  SL ER with therapist assist 2 x 10  SL shoulder flex 2 x 10 therapist assist  pec stretch supine 1' x 2    Sitting:  Shoulder rolls x 20 fed x 20 bkwd  Scap retraction 2 x 10   Pulleys 3' scaption  Rows GTB 2 x 10  B shoulder ext OTB 2 x 10   Stair table slides x 10  Stair table slides x 10  Median nerve glides x 10     Standing:  IR/ER walkouts 10 x 5s    Gerda received the following manual therapy techniques: Joint mobilizations were  applied to the: R shoulder for 10 minutes, including:  GH post/ing glides grade II  Oscillations  STM pec and subscap    Home Exercises Provided and Patient Education Provided     Education provided:   - importance of strengthening exercises    Written Home Exercises Provided: Patient instructed to cont prior HEP.  Exercises were reviewed and Gerda was able to demonstrate them prior to the end of the session.  Gerda demonstrated good  understanding of the education provided.     See EMR under Patient Instructions for exercises provided eval.    Assessment     Pt able to slightly advance AAROM flexion and abduction slides to the steps today working more against gravity. Measurements taken at today's session showing improvements in PROM but minimal progress with shoulder flexion and abduction AROM possibly indication a complete RTC tear-likely supraspinatus mm. Subjectively, pt's overall pain levels are improved; however. She does report some numbness/tingling into her R thumb and 2nd/3rd digit; therefore, median nerve glides added today. Will cont to progress per ulisses. Discussed realistic expectations with patient if a RTC complete tear is present, and she verbalized understanding.     Gerda is progressing well towards her goals.   Pt prognosis is Fair.     Pt will continue to benefit from skilled outpatient physical therapy to address the deficits listed in the problem list box on initial evaluation, provide pt/family education and to maximize pt's level of independence in the home and community environment.   Pt's spiritual, cultural and educational needs considered and pt agreeable to plan of care and goals.     Anticipated barriers to physical therapy: none    Goals:     Short Term Goals:  5 weeks     -  Pt will increase shoulder flexion AROM to at least 100 degrees in order to show improved functional mobility.  -  Pt will increase shoulder abduction AROM to at least 90 degrees in order to show improved  functional mobility.  - Pt will be independent and consistent with issued HEP in order to show carryover between therapy sessions.  - Pt. to demonstrate proper cervical and scapula retraction requiring min. to no verbal cues from PT     Long Term Goals: 10 weeks  - Pt will be able to fix her hair with enough shoulder ROM to reach the back of her head and pain at 2/10 or less In order to improve performance of ADLs.  - Pt will be able to put on a bra independently with pain at 2/10 or less in order to improve quality of life.  - Pt will improve FOTO shoulder score to CJ 37% in order to demonstrate an increase in functional mobility and improved ADL performance.  - Pt will be independent with updated HEP to maintain gains following discharge with therapy.  - Pt will be able to lift a 2# object chest height in order to improve performance of household chores/cleaning.    Plan     Cont POC, progress with ROM and strengthening as tolerated.     Ashley Holstein, PT

## 2019-09-12 ENCOUNTER — CLINICAL SUPPORT (OUTPATIENT)
Dept: REHABILITATION | Facility: HOSPITAL | Age: 84
End: 2019-09-12
Attending: FAMILY MEDICINE
Payer: MEDICARE

## 2019-09-12 DIAGNOSIS — M25.611 DECREASED ROM OF RIGHT SHOULDER: ICD-10-CM

## 2019-09-12 PROCEDURE — 97140 MANUAL THERAPY 1/> REGIONS: CPT | Mod: PO

## 2019-09-12 PROCEDURE — 97110 THERAPEUTIC EXERCISES: CPT | Mod: PO

## 2019-09-12 NOTE — PROGRESS NOTES
Digital Medicine: Health  Introduction    Introduced Gerda Ming to Digital Medicine. Discussed health  role and recommended lifestyle modifications.    The history is provided by the patient.     HYPERTENSION  Our goal is to get BP to consistently below 130/80mmHg and make the process convenient so patient can avoid extra trips to the office. Getting your blood pressure below 130/80mmHg (definition of control) will reduce your risk for heart attack, kidney failure, stroke and death (as well as kidney failure, eye disease, & dementia)      Reviewed non-pharmacologic therapies and impact on BP.      Explained that we expect patient to obtain several blood pressures per week at random times of day.  Instructed patient not to allow anyone else to use phone and monitoring device.  Confirmed appropriate BP monitoring technique.      Patient's BP goal is 140/90.Patient's BP average is 162/71 mmHg, which is above goal, per 2017 ACC/AHA Hypertension Guidelines.      Health  received high BP alert.    Patient is experiencing symptoms   .       Last 5 Patient Entered Readings                                      Current 30 Day Average: 162/71     Recent Readings 9/11/2019 9/11/2019 9/11/2019 9/11/2019 9/10/2019    SBP (mmHg) 162 162 175 175 161    DBP (mmHg) 72 72 73 73 71    Pulse 62 62 64 64 57                Diet:   Patient reports eating or drinking the following: fast food and restaurant food    Barriers to a Healthy Diet: lack of cooking skills    Patient reports that she only eats out, and she does notice that if she eats something that's bad for her her BP will spike (something like pizza). We discussed making healthier options when out and to ask for non added salt.     Medication Adherence:       Patient is feeling much better now that she's had a medication change. Her BP is still higher though and patient would like to talk to April, her clinician, about medication changes to help bring down BP.  Right now, she does experience leg swelling she thinks from the spironolactone. Patient isn't sure if she should be on this medication and would like to look at the possiblity of increasing labetalol and getting rid of spironolactone.     Patient also mentioned that she was only taking 1/2 tab of labetalol, however, it looks like her prescription is for 1 1/2 tab BID.     INTERVENTION(S)  recommended diet modifications, reviewed monitoring technique and encouragement/support    PLAN  patient verbalizes understanding and Clinician follow-up      There are no preventive care reminders to display for this patient.    Reviewed the importance of self-monitoring, medication adherence, and that the health  can be used as a resource for lifestyle modifications to help reduce or maintain a healthy lifestyle.    Sent link to [facility] [employer] webpages and my contact information via [employer] for future questions. Follow up scheduled.

## 2019-09-16 ENCOUNTER — CLINICAL SUPPORT (OUTPATIENT)
Dept: REHABILITATION | Facility: HOSPITAL | Age: 84
End: 2019-09-16
Attending: FAMILY MEDICINE
Payer: MEDICARE

## 2019-09-16 DIAGNOSIS — M25.611 DECREASED ROM OF RIGHT SHOULDER: ICD-10-CM

## 2019-09-16 PROCEDURE — 97110 THERAPEUTIC EXERCISES: CPT | Mod: PO

## 2019-09-16 PROCEDURE — 97140 MANUAL THERAPY 1/> REGIONS: CPT | Mod: PO

## 2019-09-16 NOTE — PROGRESS NOTES
"  Physical Therapy Daily Treatment Note     Name: Gerda Lai  Clinic Number: [id number]    Therapy Diagnosis:   Encounter Diagnosis   Name Primary?    Decreased ROM of right shoulder      Physician: Demario Sellers MD    Visit Date: 9/16/2019    Physician Orders: PT Eval and Treat   Medical Diagnosis from Referral:   Diagnosis   S46.001A (ICD-10-CM) - Injury of right rotator cuff, initial encounter      Evaluation Date: 8/1/2019  Authorization Period Expiration: 12/31/19  Plan of Care Expiration: 10/10/19  Visit # / Visits authorized: 1/20 ( 11 total)      Time In: 203 PM  Time Out: 3:00 pm  Total Billable Time: 30 minutes (TE-1, MT-1)    Precautions: Standard    Subjective     Pt reports: "not being able to use my R hand is a pain in the butt." She thinks the exercises have helped with the pain. Motion remains limited.    She was compliant with home exercise program.  Response to previous treatment: less pain  Functional change: no change    Pain: 1/10  Location: right shoulder      Objective     Gerda received therapeutic exercises to develop strength, endurance, ROM and posture for 45 minutes including:     Supine:  Chin tucks 20 x 5s  Wand AAROM ER 15 x 5s  Serratus punch 2 x 10 - (performed w/o dowel)  Wand AAROM flex 10 x 5s - NP due to pain  SL ER with therapist assist 2 x 10  SL shoulder flex 2 x 10 therapist assist  pec stretch supine 1' x 2    Sitting:  Shoulder rolls x 20 fed x 20 bkwd  Scap retraction 2 x 10   Pulleys 3' scaption  Rows GTB 2 x 10  B shoulder ext OTB 2 x 10   Stair table slides x 10  Stair table slides x 10  Median nerve glides x 10     Standing:  IR/ER walkouts 10 x 5s      Gerda received the following manual therapy techniques: Joint mobilizations were applied to the: R shoulder for 12 minutes, including:  GH post/ing glides grade II  Oscillations  STM pec and subscap    Home Exercises Provided and Patient Education Provided     Education provided:   - importance of strengthening " exercises    Written Home Exercises Provided: Patient instructed to cont prior HEP.  Exercises were reviewed and Gerda was able to demonstrate them prior to the end of the session.  Gerda demonstrated good  understanding of the education provided.     See EMR under Patient Instructions for exercises provided eval.    Assessment     Good tolerance to tx session today. Pt demonstrating independence with therex at this time. Pain levels significantly reduced since SOC. ROM remains limited significantly which may be due to RTC injury. Pt will be referred back to provider for possible imaging regarding lack of progress with ROM.  Discussed realistic expectations with patient if a RTC complete tear is present, and she verbalized understanding. DC with updated HEP next session.     Gerda is progressing well towards her goals.   Pt prognosis is Fair.     Pt will continue to benefit from skilled outpatient physical therapy to address the deficits listed in the problem list box on initial evaluation, provide pt/family education and to maximize pt's level of independence in the home and community environment.   Pt's spiritual, cultural and educational needs considered and pt agreeable to plan of care and goals.     Anticipated barriers to physical therapy: none    Goals:     Short Term Goals:  5 weeks     -  Pt will increase shoulder flexion AROM to at least 100 degrees in order to show improved functional mobility.  -  Pt will increase shoulder abduction AROM to at least 90 degrees in order to show improved functional mobility.  - Pt will be independent and consistent with issued HEP in order to show carryover between therapy sessions.  - Pt. to demonstrate proper cervical and scapula retraction requiring min. to no verbal cues from PT     Long Term Goals: 10 weeks  - Pt will be able to fix her hair with enough shoulder ROM to reach the back of her head and pain at 2/10 or less In order to improve performance of ADLs.  -  Pt will be able to put on a bra independently with pain at 2/10 or less in order to improve quality of life.  - Pt will improve FOTO shoulder score to CJ 37% in order to demonstrate an increase in functional mobility and improved ADL performance.  - Pt will be independent with updated HEP to maintain gains following discharge with therapy.  - Pt will be able to lift a 2# object chest height in order to improve performance of household chores/cleaning.    Plan     Cont POC, progress with ROM and strengthening as tolerated. DC next visit.    Ashley Holstein, CAMILLA

## 2019-09-16 NOTE — PROGRESS NOTES
[place]     Time In: 930  Time Out: 1025  Minutes: 55  Timed Code Treatment Minutes: 48 Minutes    Date: 2019  Patient Name: Carlota Perez,  Gender:  female        CSN: [id number]   : 1972  (52 y.o.)       Referring Practitioner: Sandra Morales DPM      Diagnosis: Plantar fasciitis, right, achilles tendon  Treatment Diagnosis: chronic right foot/ankle pain, decreased right ankle AROM, right ankle weakness, difficulty ambulating   Additional Pertinent Hx: PMH: Obesity. L ankle surgery in . General:  PT Visit Information  Onset Date: 19  PT Insurance Information: [employer]- allowed 30 visits, precert required after 58WQ visit; aquatics and modalities covered except ionto, HP/CP  Total # of Visits Approved: 30  Total # of Visits to Date: 13  Plan of Care/Certification Expiration Date: 19  Progress Note Counter: 7/10 for PN. PN completed 19 on visit 8. Subjective:  Chart Reviewed: Yes  Comments: Follow up with Dr. Margie Shepherd 19. Subjective: Pt reports bottom of right foot is feeling better but lateral ankle is agitated. Pt planning to get wheels to put on walker to normalize gait pattern.       Pain:  Patient Currently in Pain: Yes  Pain Assessment: 0-10  Pain Level: 3  Pain Type: Chronic pain  Pain Location: Ankle  Pain Orientation: Right      Objective         Exercises  Exercise 11: Deep tissue massage to right plantar fascia with great toe extension stretch passively x5 min and lateral ankle x3 min  Exercise 12: Rockerboard 2 directions, taps x10 each, balance x30 sec each with 1 UE support  Exercise 13: B tandem balance  on foam 2x15 sec hold -light finger touches  Exercise 14: Ultrasound to right plantar foot/extensor digitorum x5 min and lateral ankle x5 min- 3MHz, cont, 1.0 w/cm2, seated  Exercise 15: Eccentric step taps 2\" x10 -difficulty bending The following patient has been assigned to Kristie Mancuso, RN with [facility] for high risk screening.    Reason: High Risk    Please contact [facility] at [phone] with any questions.    Thank you,  Cristina Camacho

## 2019-09-18 ENCOUNTER — TELEPHONE (OUTPATIENT)
Dept: PRIMARY CARE CLINIC | Facility: CLINIC | Age: 84
End: 2019-09-18

## 2019-09-18 ENCOUNTER — PATIENT OUTREACH (OUTPATIENT)
Dept: OTHER | Facility: OTHER | Age: 84
End: 2019-09-18

## 2019-09-18 DIAGNOSIS — E78.00 PURE HYPERCHOLESTEROLEMIA: ICD-10-CM

## 2019-09-18 DIAGNOSIS — I10 ESSENTIAL HYPERTENSION: ICD-10-CM

## 2019-09-18 DIAGNOSIS — I10 HYPERTENSION, ESSENTIAL: ICD-10-CM

## 2019-09-18 DIAGNOSIS — I10 HYPERTENSION, ESSENTIAL: Primary | ICD-10-CM

## 2019-09-18 RX ORDER — SPIRONOLACTONE 50 MG/1
TABLET, FILM COATED ORAL
Qty: 45 TABLET | Refills: 3 | Status: SHIPPED | OUTPATIENT
Start: 2019-09-18 | End: 2019-11-25

## 2019-09-18 RX ORDER — LABETALOL 100 MG/1
50 TABLET, FILM COATED ORAL 2 TIMES DAILY
Qty: 15 TABLET | Refills: 5 | Status: SHIPPED | OUTPATIENT
Start: 2019-09-18 | End: 2019-10-01 | Stop reason: DRUGHIGH

## 2019-09-18 NOTE — TELEPHONE ENCOUNTER
Spoke with patient and advised her that Dr. Sellers would like her to increase her Aldactone to 1 tablet in the morning and 1/2 tablet in the PM.  Patient states that this Aldactone causes swelling in her ankles, calves, and knees,  It affects her walking and she is not happy with this medication at all.  She also takes 1/2 Labetalol tablet twice daily.  Does she really need to increase the Aldactone?  She feels that she needs to take 1 Labetalol tablet twice daily instead of adjusting the Aldactone.  She states that she is going to call Monica Gutiérrez, the PharmD with [employer].

## 2019-09-18 NOTE — PROGRESS NOTES
Digital Medicine: Clinician Follow-Up    Mrs. Lorenz called to report leg and ankle swelling with uncontrolled blood pressure. Episodes of hypotension have resolved where she reports no more dizziness or lightheadedness has occurred since reducing dose of labetalol to half-tablet.    The history is provided by the patient. No  was used.     Follow Up  Follow-up reason(s): reading review      Readings are trending down           Sleep Apnea  Patient not previously diagnosed with SADE and           INTERVENTION(S)  recommended med change and encouragement/support    PLAN  patient verbalizes understanding and patient amenable to changes    Current 30-day blood pressure average of 162/71 is uncontrolled, does not meet goal of <140/90.  Reviewed blood pressure readings submitted. Readings appear to be trending downward where DBP is meeting goal but SBP is still consistently elevated.  Reviewed medication profile with Mrs. Lorenz: She is taking half-tablet (50 mg) labetalol twice daily due to previously experiencing s/sx of hypotension at higher dose. She is taking Tekturna 300 mg daily. And spironolactone 50 mg once daily.   Discussed increasing spironolactone as suggested by Dr. Sellers to 1.5 tablets (75 mg) once daily. Patient agreed.  Counseled patient on adequate hydration to prevent hypotension 2/2 dehydration.  Continue labetalol and Tekturna as discussed.  Reconciled medication profile.    Will follow-up in 2 weeks due to medication change.     There are no preventive care reminders to display for this patient.    Last 5 Patient Entered Readings                                      Current 30 Day Average: 162/71     Recent Readings 9/18/2019 9/18/2019 9/11/2019 9/11/2019 9/11/2019    SBP (mmHg) 157 157 162 162 175    DBP (mmHg) 76 76 72 72 73    Pulse 56 56 62 62 64        Hypertension Medications             labetalol (NORMODYNE) 100 MG tablet Take 0.5 tablets (50 mg total) by mouth 2 (two) times  daily.    spironolactone (ALDACTONE) 50 MG tablet Take 1.5 tablets (75 mg total) by mouth once daily for blood pressure.    TEKTURNA 300 mg Tab TAKE 1 TABLET (300 MG TOTAL) BY MOUTH ONCE DAILY.

## 2019-09-18 NOTE — TELEPHONE ENCOUNTER
According to our chart, the patient should be on Aldactone 50mg daily.  Please ask her to take one tablet in the morning, 1/2 tablet in pm.  Continue to check blood pressures and call with results in 2 weeks, thanks

## 2019-09-19 ENCOUNTER — CLINICAL SUPPORT (OUTPATIENT)
Dept: REHABILITATION | Facility: HOSPITAL | Age: 84
End: 2019-09-19
Attending: FAMILY MEDICINE
Payer: MEDICARE

## 2019-09-19 DIAGNOSIS — M25.611 DECREASED ROM OF RIGHT SHOULDER: ICD-10-CM

## 2019-09-19 PROCEDURE — 97140 MANUAL THERAPY 1/> REGIONS: CPT | Mod: PO

## 2019-09-19 PROCEDURE — 97110 THERAPEUTIC EXERCISES: CPT | Mod: PO

## 2019-09-19 NOTE — TELEPHONE ENCOUNTER
Spoke with patient and advised that  States Aldactone is a diuretic which should not cause swelling in her ankles and if she does not want to follow his recommendations to schedule a follow up appt with Dr. Camarnea.  Patient states that she did speak with April yesterday and she was advised to listen to what Dr. Sellers instructed, so that is what she will do.  Also, he told he that he is retiring but she already has her annual exam scheduled with him for January.  I advised that she will be contacted if we need to reschedule.  Fasting lab appointment scheduled prior.

## 2019-09-19 NOTE — PROGRESS NOTES
Physical Therapy Daily Treatment Note/Discharge note     Name: Gerda Lai  Clinic Number: [id number]    Therapy Diagnosis:   Encounter Diagnosis   Name Primary?    Decreased ROM of right shoulder      Physician: Demario Sellers MD    Visit Date: 9/19/2019    Physician Orders: PT Eval and Treat   Medical Diagnosis from Referral:   Diagnosis   S46.001A (ICD-10-CM) - Injury of right rotator cuff, initial encounter      Evaluation Date: 8/1/2019  Authorization Period Expiration: 12/31/19  Plan of Care Expiration: 10/10/19  Visit # / Visits authorized: 2/20 ( 12 total)      Time In: 203 PM  Time Out: 3:00 pm  Total Billable Time: 30 minutes (TE-1, MT-1)    Precautions: Standard    Subjective     Pt reports:pain is much better but she has limited mobility of her R arm.    She was compliant with home exercise program.  Response to previous treatment: less pain  Functional change: no change    Pain: 1/10  Location: right shoulder      Objective     Reassess:     Active Range of Motion:   Shoulder Right   Flexion 33   Abduction 22   ER at 0 40   ER at 90 unable   IR T8      Unable to hold AROM for any amount of time     Passive Range of Motion:   Shoulder Right   Flexion 165   Abduction 180   ER at 0 70   ER at 90 NT   IR 83             Gerda received therapeutic exercises to develop strength, endurance, ROM and posture for 45 minutes including:     Supine:  Chin tucks 20 x 5s  Wand AAROM ER 15 x 5s  Serratus punch 2 x 10 - (performed w/o dowel)  Wand AAROM flex 10 x 5s - NP due to pain  SL ER with therapist assist 2 x 10  SL shoulder flex 2 x 10 therapist assist  pec stretch supine 1' x 2    Sitting:  Shoulder rolls x 20 fed x 20 bkwd  Scap retraction 2 x 10   Pulleys 3' scaption  Rows GTB 2 x 10  B shoulder ext OTB 2 x 10   Stair table slides x 10  Stair table slides x 10  Median nerve glides x 10     Standing:  IR/ER walkouts 10 x 5s      Gerda received the following manual therapy techniques: Joint mobilizations  were applied to the: R shoulder for 12 minutes, including:  GH post/ing glides grade II  Oscillations  STM pec and subscap    Home Exercises Provided and Patient Education Provided     Education provided:   - importance of strengthening exercises    Written Home Exercises Provided: Patient instructed to cont prior HEP.  Exercises were reviewed and Gerda was able to demonstrate them prior to the end of the session.  Gerad demonstrated good  understanding of the education provided.     See EMR under Patient Instructions for exercises provided eval.    Assessment     Pt has made good progress with pain levels since SOC; however, she has not made much progress in terms of R shoulder ROM and functional mobility. Gains were made with PROM; however, pt's AROM is significantly limited leading therapist to believe there may be significant injury to the RTC complex. Possible MRI evaluation may need to be performed to assess integrity. Pt referred back to provider for follow up. She was encouraged to continue with her previously issued HEP as her pain is much improved. She has reached maximum rehab potential and is now discharged.    Gerda is progressing well towards her goals.   Pt prognosis is Fair.     Pt will continue to benefit from skilled outpatient physical therapy to address the deficits listed in the problem list box on initial evaluation, provide pt/family education and to maximize pt's level of independence in the home and community environment.   Pt's spiritual, cultural and educational needs considered and pt agreeable to plan of care and goals.     Anticipated barriers to physical therapy: none    Goals:     Short Term Goals:  5 weeks     -  Pt will increase shoulder flexion AROM to at least 100 degrees in order to show improved functional mobility. Not met 9/19/2019   -  Pt will increase shoulder abduction AROM to at least 90 degrees in order to show improved functional mobility. Not met 9/19/2019   - Pt  will be independent and consistent with issued HEP in order to show carryover between therapy sessions. Met 9/19/2019   - Pt. to demonstrate proper cervical and scapula retraction requiring min. to no verbal cues from PT. Not met 9/19/2019        Long Term Goals: 10 weeks  - Pt will be able to fix her hair with enough shoulder ROM to reach the back of her head and pain at 2/10 or less In order to improve performance of ADLs. Not met 9/19/2019   - Pt will be able to put on a bra independently with pain at 2/10 or less in order to improve quality of life. Not met 9/19/2019   - Pt will improve FOTO shoulder score to CJ 37% in order to demonstrate an increase in functional mobility and improved ADL performance. Did not assess  - Pt will be independent with updated HEP to maintain gains following discharge with therapy. Met 9/19/2019   - Pt will be able to lift a 2# object chest height in order to improve performance of household chores/cleaning. Not met 9/19/2019     Plan     Discharge with referral back to referring provider for follow up and potential need for MRI evaluation    Ashley Holstein, PT

## 2019-09-20 DIAGNOSIS — I10 ESSENTIAL HYPERTENSION: ICD-10-CM

## 2019-09-20 PROBLEM — M25.611 DECREASED ROM OF RIGHT SHOULDER: Status: RESOLVED | Noted: 2019-08-01 | Resolved: 2019-09-20

## 2019-09-20 RX ORDER — LABETALOL 100 MG/1
TABLET, FILM COATED ORAL
Qty: 90 TABLET | Refills: 2 | Status: SHIPPED | OUTPATIENT
Start: 2019-09-20 | End: 2019-11-25

## 2019-10-01 ENCOUNTER — PATIENT OUTREACH (OUTPATIENT)
Dept: OTHER | Facility: OTHER | Age: 84
End: 2019-10-01

## 2019-10-01 NOTE — PROGRESS NOTES
Digital Medicine: Clinician Follow-Up    Gerda Lai submitted a reading of 235/90 at 9/29/2019  8:48 PM. Patient reports eating salty popcorn before the [employer] game. She did not feel bad but took an extra dose of labetalol to help based what she had been told by a previous provider. She admits to struggling with her diet on the weekends to limit salty foods. She is no longer experiencing the tiredness and dizziness that was an issue when we first starting working together based on the adjustments made thus far. She is currently taking labetalol half-tablet (50 mg) twice daily.    The history is provided by the patient. No  was used.     Follow Up  Follow-up reason(s): reading review      Readings are trending up due to lifestyle change and Not able to follow proper diet over the weekends..            Sleep Apnea  Patient not previously diagnosed with SADE and         Medication Adherence:   She misses doses: never        INTERVENTION(S)  recommended diet modifications, recommended med change and encouragement/support    PLAN  patient verbalizes understanding, demonstrates understanding via teach back and patient amenable to changes    Current 30-day blood pressure average of 171/74 is uncontrolled, does not meet goal of <140/90.  Reviewed blood pressure readings submitted. Readings are trending upward. DBP is consistently controlled; SBP is consistently uncontrolled, above goal.  Discussed diet and need to follow low sodium restrictions for blood pressure.      Increase labetalol by 50 mg by taking 1 tablet (100 mg) in the morning and taking half-tablet (50 mg) in the evening. Pulse is currently between mid-50's to mid-60's. Patient was counseled to call if she experiences tiredness or fatigue with increased dose of labetolol.  Continue spironolactone and Tekturna.    Follow-up in 2 weeks due to medication adjustment.       There are no preventive care reminders to display for this  patient.    Last 5 Patient Entered Readings                                      Current 30 Day Average: 171/74     Recent Readings 9/30/2019 9/30/2019 9/30/2019 9/30/2019 9/29/2019    SBP (mmHg) 175 175 184 184 237    DBP (mmHg) 80 80 81 81 87    Pulse 53 53 57 57 66           Hypertension Medications             labetalol (NORMODYNE) 100 MG tablet TAKE 1 AND 1/2 TABLETS BY MOUTH TWICE A DAY. Patient taking differently: Taking 1 tablet (100 mg) in the morning and taking half-tablet (50 mg) in the evening.    spironolactone (ALDACTONE) 50 MG tablet Take 1.5 tablets (75 mg total) by mouth once daily for blood pressure.    TEKTURNA 300 mg Tab TAKE 1 TABLET (300 MG TOTAL) BY MOUTH ONCE DAILY.

## 2019-10-09 ENCOUNTER — PATIENT OUTREACH (OUTPATIENT)
Dept: OTHER | Facility: OTHER | Age: 84
End: 2019-10-09

## 2019-10-10 NOTE — PROGRESS NOTES
"Digital Medicine: Clinician Follow-Up    Called patient for hypertension follow-up. She is not certain why her blood pressures are reading so high. She reports feeling fine, denies s/sx of hypertension - no CP, SOB, HA, blurred vision. But she is concerned about her readings. She admits that she has been very stressed with gathering tax information and reports having knee pain from inflammation. She is also still having issues with the torn rotator cuff in her right shoulder. She is "treating her pain with Tylenol and diclofenac topical twice daily over a very large area of her body." She admits that she is now struggling with remembering to take medications each day in the pill box. She does not believe she has missed any doses but she is not able to keep taking them to schedule. It usually occurs if she breaks her routine for the day. Mrs. Lorenz is interested in receiving another steroid injection for her knee and shoulder pain as she states it was very helpful last time received. She is also interested in referral to sports medicine for rotator cuff tear repair.         Follow Up  Follow-up reason(s): reading review      Alert received.   Care Team received high BG alert.  Patient is not experiencing symptoms.          Sleep Apnea  Patient not previously diagnosed with SADE and           INTERVENTION(S)  encouragement/support and goal setting    PLAN  patient verbalizes understanding, demonstrates understanding via teach back, patient amenable to changes, patient refuses additional therapy and referral    Current 30-day BP average of 183/78 does not meet goal of <140/90.  Reviewed readings submitted. BP readings trending upwards. DBP is consistently controlled; SBP has been consistently elevated over the past 2 weeks.   Discussed ADRs of diclofenac. Recommended stopping its use and using Biofreeze or Salonpas for knee pain.   Discussed reducing stress. Recommended water activity for stress reduction.  Discussed " impact of steroid injections on blood pressure. Will monitor BP increase if injection received.  Discussed setting an alarm to help with medication schedule if problem continues.  Discussed anxiety induced by measuring blood pressure. Suggested measuring 3 times/week to reduce any anxiety caused from measuring.     Stop diclofenac topical. Start Biofreeze or Salonpas patches for pain. Monitor for changes in blood pressure.   Increase turmeric to two tablets daily.   Recommended felodipine 2.5 mg at night for better BP control. Ms. Lorenz refused; she would like to try the other things we discussed first before adding on another agent.   Requested BP device battery base be charged.  Start water aerobics for stress and pain relief.  Referral to Bianca Hu PA-C in Ortho for follow-up on injection and sports medicine consult.     Follow-up next week.      There are no preventive care reminders to display for this patient.    Last 5 Patient Entered Readings                                      Current 30 Day Average: 183/78     Recent Readings 10/9/2019 10/9/2019 10/9/2019 10/9/2019 10/7/2019    SBP (mmHg) 191 191 193 193 207    DBP (mmHg) 78 78 76 76 79    Pulse 55 55 56 56 57             Hypertension Medications             labetalol (NORMODYNE) 100 MG tablet TAKE 1 AND 1/2 TABLETS BY MOUTH TWICE A DAY    spironolactone (ALDACTONE) 50 MG tablet Take 1.5 tablets (75 mg total) by mouth once daily for blood pressure.    TEKTURNA 300 mg Tab TAKE 1 TABLET (300 MG TOTAL) BY MOUTH ONCE DAILY.            Patient asked: Mrs. Lorenz is interested in receiving another steroid injection for her knee and shoulder pain. She is also interested in referral to sports medicine for rotator cuff tear repair.   Referring to Bianca Hu PA-C in Ortho for follow-up Referred to physician for clarification.    Patient asked:     Patient asked:

## 2019-10-14 ENCOUNTER — PATIENT OUTREACH (OUTPATIENT)
Dept: OTHER | Facility: OTHER | Age: 84
End: 2019-10-14

## 2019-10-14 ENCOUNTER — PATIENT OUTREACH (OUTPATIENT)
Dept: ADMINISTRATIVE | Facility: OTHER | Age: 84
End: 2019-10-14

## 2019-10-14 NOTE — PROGRESS NOTES
Digital Medicine: Clinician Follow-Up    Called patient for hypertension follow-up. She reports getting the Aspercreme and Salonpas as discussed in place of Diclofenac. It was only helpful on the first day. She resumed using the diclofenac today after having pain yesterday. She charged her blood pressure device as instructed. She is now reporting a phelm that develops in the back of her throat at night that is not too bad but takes her a while to get up.     The history is provided by the patient. No  was used.     Follow Up  Follow-up reason(s): reading review      Readings are trending up       Assessment/Plan    INTERVENTION(S)  encouragement/support    PLAN  patient verbalizes understanding and additional monitoring needed    Current 30-day BP average of 190/81 is uncontrolled, not at goal of <130/80.  Reviewed BP readings submitted. Trending upwards - DBP consistently controlled, meets goal; SBP consistently uncontrolled, above goal of <130.  No new readings reported since last outreach on 10/9/19 for high BP alert.    Continue current regimen.  Additional BP readings required. Monitor for improvements.  Follow-up in a week.      There are no preventive care reminders to display for this patient.    Last 5 Patient Entered Readings                                      Current 30 Day Average: 190/81     Recent Readings 10/9/2019 10/9/2019 10/9/2019 10/9/2019 10/7/2019    SBP (mmHg) 191 191 193 193 207    DBP (mmHg) 78 78 76 76 79    Pulse 55 55 56 56 57             Hypertension Medications             labetalol (NORMODYNE) 100 MG tablet TAKE 1 AND 1/2 TABLETS BY MOUTH TWICE A DAY    spironolactone (ALDACTONE) 50 MG tablet Take 1.5 tablets (75 mg total) by mouth once daily for blood pressure.    TEKTURNA 300 mg Tab TAKE 1 TABLET (300 MG TOTAL) BY MOUTH ONCE DAILY.

## 2019-10-15 ENCOUNTER — PATIENT OUTREACH (OUTPATIENT)
Dept: OTHER | Facility: OTHER | Age: 84
End: 2019-10-15

## 2019-10-15 DIAGNOSIS — I10 HYPERTENSION, ESSENTIAL: Primary | ICD-10-CM

## 2019-10-15 RX ORDER — VALSARTAN 80 MG/1
80 TABLET ORAL 2 TIMES DAILY
Qty: 60 TABLET | Refills: 2 | Status: CANCELLED | OUTPATIENT
Start: 2019-10-15 | End: 2020-10-14

## 2019-10-15 RX ORDER — LORAZEPAM 0.5 MG/1
TABLET ORAL
Qty: 30 TABLET | Refills: 0 | OUTPATIENT
Start: 2019-10-15

## 2019-10-15 RX ORDER — LORAZEPAM 0.5 MG/1
0.25 TABLET ORAL 2 TIMES DAILY
Qty: 30 TABLET | Refills: 0 | Status: SHIPPED | OUTPATIENT
Start: 2019-10-15 | End: 2019-11-30 | Stop reason: SDUPTHER

## 2019-10-15 NOTE — PROGRESS NOTES
Digital Medicine: Clinician Follow-Up    Gerda Lai submitted a reading of 207/86 at 10/15/2019  2:54 PM. Called patient for hypertension check-in. Patient expresses concern for elevated BP readings. She has scaled back on the amount of diclofenac used for body pains and reports that Salonpas and Biofreeze at home did not work well for her. She reports going to the spa pool yesterday as suggested to exercise which helped to relieve some of the pressure and pain in her knees. She plans to go 3 times a week - Tues/Thurs/Sat. She states she has charged her BP device as instructed.     The history is provided by the patient. No  was used.     Follow Up  Follow-up reason(s): reading review      Alert received.   Care Team received high BP alert.  Patient is not experiencing symptoms.          Sleep Apnea  Patient not previously diagnosed with SADE and           INTERVENTION(S)  reviewed appropriate dose schedule, recommended physical activity, recommended med change and encouragement/support    PLAN  patient verbalizes understanding, demonstrates understanding via teach back and patient amenable to changes    Current 30-day BP average of 188/80 is uncontrolled and does not meet goal of <140/90.  Reviewed readings submitted. DBP consistently controlled; SBP consistently elevated and trending upwards.  New BMP needed due to increase of spironolactone.   Encouraged physical exercise, suggested pool to help alleviate arthritic pain in knees.  Counseled on ADRs of felodipine. Explained that she may experience swelling of legs/ankle/feet. Wear compression socks and elevate feet when home to help reduce/prevent.  If swelling is intolerable, patient will call to let me know.   Researched information for compression socks to assist patient in her purchase - available on [employer] and at [facility].   Sent email providing information for dose schedule, [employer] order of compression socks and [facility]  Takeback Day as requested.    Start felodipine 2.5 mg in the morning for better BP control.  Continue labetalol, spironolactone and Tekturna as prescribed based on the following dose schedule:       10 AM:  Take labetalol, spironolactone and felodipine (as well as any other morning medications you take)       12 PM:  Measure blood pressure         6 PM:  Take Tekturna         8 PM:  Take labetalol, spironolactone, Tylenol arthritis and pravastatin    Bedtime:  Measure blood pressure (if you forget, no problem - rest well!)  Complete BMP on 10/17/19 after appointment for electrolyte assessment.    Follow-up in 2 weeks due to medication adjustment.    Total time spent on call 50 mins      There are no preventive care reminders to display for this patient.    Last 5 Patient Entered Readings                                      Current 30 Day Average: 188/80     Recent Readings 10/15/2019 10/15/2019 10/14/2019 10/14/2019 10/9/2019    SBP (mmHg) 207 207 182 182 191    DBP (mmHg) 86 86 73 73 78    Pulse 54 54 60 60 55             Hypertension Medications             labetalol (NORMODYNE) 100 MG tablet TAKE 1 AND 1/2 TABLETS BY MOUTH TWICE A DAY  Patient taking differently: Taking 1 tablet (100 mg) in the morning and taking half-tablet (50 mg) in the evening.      spironolactone (ALDACTONE) 50 MG tablet Take 1.5 tablets (75 mg total) by mouth once daily for blood pressure. Patient taking as: 50 mg in the morning and 25 mg in the evening    TEKTURNA 300 mg Tab TAKE 1 TABLET (300 MG TOTAL) BY MOUTH ONCE DAILY.            Patient asked: I have medications that I need to discard and I usually give them back during the national take-back day. When is that?   [facility] Takeback day is Saturday, October 26, 2019 from 10a to 2p. [facility] at [address] [place] [address] will serve as a location.     Patient asked:     Patient asked:

## 2019-10-15 NOTE — TELEPHONE ENCOUNTER
lov 07/02/19  Advised patient will forward refill to Dr. Sellers for refill authorization and verbalized understanding

## 2019-10-15 NOTE — TELEPHONE ENCOUNTER
----- Message from Jesi Maynard sent at 10/15/2019  1:34 PM CDT -----  Contact: [phone]  Patient is requesting a call from the office in regards to medication LORazepam (ATIVAN) 0.5 MG tablet. She wants to know why her refill request was denied.  Please advise, thanks

## 2019-10-16 RX ORDER — FELODIPINE 2.5 MG/1
2.5 TABLET, EXTENDED RELEASE ORAL DAILY
Qty: 30 TABLET | Refills: 5 | Status: SHIPPED | OUTPATIENT
Start: 2019-10-16 | End: 2019-10-17 | Stop reason: ALTCHOICE

## 2019-10-17 ENCOUNTER — PATIENT OUTREACH (OUTPATIENT)
Dept: OTHER | Facility: OTHER | Age: 84
End: 2019-10-17

## 2019-10-17 ENCOUNTER — TELEPHONE (OUTPATIENT)
Dept: PRIMARY CARE CLINIC | Facility: CLINIC | Age: 84
End: 2019-10-17

## 2019-10-17 ENCOUNTER — OFFICE VISIT (OUTPATIENT)
Dept: CARDIOLOGY | Facility: CLINIC | Age: 84
End: 2019-10-17
Payer: MEDICARE

## 2019-10-17 VITALS
WEIGHT: 141.75 LBS | HEART RATE: 58 BPM | BODY MASS INDEX: 25.12 KG/M2 | SYSTOLIC BLOOD PRESSURE: 180 MMHG | DIASTOLIC BLOOD PRESSURE: 76 MMHG | HEIGHT: 63 IN

## 2019-10-17 DIAGNOSIS — E78.00 PURE HYPERCHOLESTEROLEMIA: ICD-10-CM

## 2019-10-17 DIAGNOSIS — N18.30 CKD (CHRONIC KIDNEY DISEASE), STAGE III: ICD-10-CM

## 2019-10-17 DIAGNOSIS — I70.0 AORTIC ATHEROSCLEROSIS: ICD-10-CM

## 2019-10-17 DIAGNOSIS — I10 HYPERTENSION, ESSENTIAL: Primary | ICD-10-CM

## 2019-10-17 PROCEDURE — 99213 OFFICE O/P EST LOW 20 MIN: CPT | Mod: PBBFAC,PO | Performed by: NURSE PRACTITIONER

## 2019-10-17 PROCEDURE — 93005 ELECTROCARDIOGRAM TRACING: CPT | Mod: PBBFAC,PO | Performed by: INTERNAL MEDICINE

## 2019-10-17 PROCEDURE — 99999 PR PBB SHADOW E&M-EST. PATIENT-LVL III: CPT | Mod: PBBFAC,,, | Performed by: NURSE PRACTITIONER

## 2019-10-17 PROCEDURE — 93010 ELECTROCARDIOGRAM REPORT: CPT | Mod: S$PBB,,, | Performed by: INTERNAL MEDICINE

## 2019-10-17 PROCEDURE — 99999 PR PBB SHADOW E&M-EST. PATIENT-LVL III: ICD-10-PCS | Mod: PBBFAC,,, | Performed by: NURSE PRACTITIONER

## 2019-10-17 PROCEDURE — 93010 EKG 12-LEAD: ICD-10-PCS | Mod: S$PBB,,, | Performed by: INTERNAL MEDICINE

## 2019-10-17 PROCEDURE — 99214 OFFICE O/P EST MOD 30 MIN: CPT | Mod: S$PBB,,, | Performed by: NURSE PRACTITIONER

## 2019-10-17 PROCEDURE — 99214 PR OFFICE/OUTPT VISIT, EST, LEVL IV, 30-39 MIN: ICD-10-PCS | Mod: S$PBB,,, | Performed by: NURSE PRACTITIONER

## 2019-10-17 RX ORDER — NIFEDIPINE 60 MG/1
60 TABLET, EXTENDED RELEASE ORAL DAILY
Qty: 30 TABLET | Refills: 11 | Status: SHIPPED | OUTPATIENT
Start: 2019-10-17 | End: 2019-11-18 | Stop reason: ALTCHOICE

## 2019-10-17 RX ORDER — HYDROCHLOROTHIAZIDE 12.5 MG/1
12.5 TABLET ORAL DAILY
Qty: 30 TABLET | Refills: 11 | Status: SHIPPED | OUTPATIENT
Start: 2019-10-17 | End: 2019-11-25

## 2019-10-17 NOTE — PROGRESS NOTES
Ms. Lai is a patient of Dr. Camarena and was last seen in [facility] Cardiology Visit 19      Subjective:   Patient ID:  Gerda Lai is a 86 y.o. female who presents for follow-up of Hypertension    Problem List:  Hypertension  - intolerance to multiple antihypertensive meds   HLD  Carotid artery disease, 50-59% bilaterally  Breast cancer s/p lumpectomy in   Father  in his 50s from MI    HPI:     Ms. Lai is in clinic today for hypertension follow up.  She had tolerated the lower doses of nifedipine but had significant lower extremity edema on the higher dose of 90 mg.  She has not started the felodipine.  She is challenging to treat because she has not tolerated most antihypertensives.  She can not be on an ACEI/ARB d/t h/o angioedema.  She has been on thiazide diuretics in the past but it is unclear why they were stopped.  Suspect she has more side effects on the higher doses of most medications based on her history and should not have her medications pushed to top doses.  Patient denies chest pain with exertion or at rest, palpitations, SOB, PLAZA, dizziness, syncope, edema, orthopnea, PND, or claudication.      Review of Systems   Constitution: Negative for decreased appetite, diaphoresis, malaise/fatigue, weight gain and weight loss.   Eyes: Negative for visual disturbance.   Cardiovascular: Negative for chest pain, claudication, dyspnea on exertion, irregular heartbeat, leg swelling, near-syncope, orthopnea, palpitations, paroxysmal nocturnal dyspnea and syncope.        Denies chest pressure   Respiratory: Negative for cough, hemoptysis, shortness of breath, sleep disturbances due to breathing and snoring.    Endocrine: Negative for cold intolerance and heat intolerance.   Hematologic/Lymphatic: Negative for bleeding problem. Does not bruise/bleed easily.   Musculoskeletal: Negative for myalgias.   Gastrointestinal: Negative for bloating, abdominal pain, anorexia, change in bowel habit,  constipation, diarrhea, nausea and vomiting.   Neurological: Negative for difficulty with concentration, disturbances in coordination, excessive daytime sleepiness, dizziness, headaches, light-headedness, loss of balance, numbness and weakness.   Psychiatric/Behavioral: The patient does not have insomnia.        Allergies and current medications updated and reviewed:  Review of patient's allergies indicates:   Allergen Reactions    Sulfa (sulfonamide antibiotics) Rash    Clarithromycin Other (See Comments)     Weak, extreme fatigue. dizziness    Flexeril [cyclobenzaprine] Other (See Comments)     Dizziness    Iodine and iodide containing products     Lisinopril Other (See Comments)     Cough and sensation of throat swelling/?angioedema    Losartan Rash    Metoprolol Swelling     Tightness in throat    Tramadol Other (See Comments)     Dizzy and weak    Verapamil (bulk) Palpitations    Voltaren [diclofenac sodium] Other (See Comments)     Drops blood pressure     Current Outpatient Medications   Medication Sig    acetaminophen (TYLENOL) 650 MG TbSR Take 650 mg by mouth as needed (pain).     alendronate (FOSAMAX) 35 MG tablet Take 1 tablet (35 mg total) by mouth every 7 days.    B INFANTIS/B ANI/B JOSE/B BIFID (PROBIOTIC 4X ORAL) Take 1 tablet by mouth daily    coenzyme Q10 (CO Q-10) 100 mg capsule Take 100 mg by mouth once daily.    diclofenac sodium (VOLTAREN) 1 % Gel Apply topically 2 (two) times daily.    diclofenac sodium 1.5 % Drop APPLY 40 DROPS TO EACH PAINFUL AREA (MAXIMUM 2 AREAS) UP TO 4 TIMES DAILY.  DROPS/DAY.    felodipine (PLENDIL) 2.5 MG Tb24 Take 1 tablet (2.5 mg total) by mouth once daily.    glucosamine-chondroitin 500-400 mg tablet Take 1 tablet by mouth once daily.     ipratropium (ATROVENT) 0.03 % nasal spray 2 sprays by Nasal route 2 (two) times daily.    labetalol (NORMODYNE) 100 MG tablet TAKE 1 AND 1/2 TABLETS BY MOUTH TWICE A DAY    LORazepam (ATIVAN) 0.5 MG tablet  "Take 0.5 tablets (0.25 mg total) by mouth 2 (two) times daily. Prn severe anxiety    multivitamin capsule Take 1 capsule by mouth once daily.    omeprazole (PRILOSEC OTC) 20 MG tablet Take 20 mg by mouth once daily.      pravastatin (PRAVACHOL) 40 MG tablet TAKE 1 TABLET (40 MG TOTAL) BY MOUTH ONCE DAILY.    spironolactone (ALDACTONE) 50 MG tablet Take 1.5 tablets (75 mg total) by mouth once daily for blood pressure.    TEKTURNA 300 mg Tab TAKE 1 TABLET (300 MG TOTAL) BY MOUTH ONCE DAILY.    TURMERIC ORAL Take 1,000 mg by mouth once daily.     No current facility-administered medications for this visit.        Objective:          BP (!) 180/76   Pulse (!) 58   Ht 5' 3" (1.6 m)   Wt 64.3 kg (141 lb 12.1 oz)   BMI 25.11 kg/m²       Physical Exam   Constitutional: She is oriented to person, place, and time. Vital signs are normal. She appears well-developed and well-nourished. She is active. No distress.   HENT:   Head: Normocephalic and atraumatic.   Eyes: Conjunctivae and lids are normal. No scleral icterus.   Neck: Neck supple. Normal carotid pulses, no hepatojugular reflux and no JVD present. Carotid bruit is not present.   Cardiovascular: Normal rate, regular rhythm, S1 normal, S2 normal and intact distal pulses. PMI is not displaced. Exam reveals no gallop and no friction rub.   No murmur heard.  Pulses:       Carotid pulses are 2+ on the right side, and 2+ on the left side.       Radial pulses are 2+ on the right side, and 2+ on the left side.        Dorsalis pedis pulses are 2+ on the right side, and 2+ on the left side.        Posterior tibial pulses are 1+ on the right side, and 1+ on the left side.   Pulmonary/Chest: Effort normal and breath sounds normal. No respiratory distress. She has no decreased breath sounds. She has no wheezes. She has no rhonchi. She has no rales. She exhibits no tenderness.   Abdominal: Soft. Normal appearance and bowel sounds are normal. She exhibits no distension, no " fluid wave, no abdominal bruit, no ascites and no pulsatile midline mass. There is no hepatosplenomegaly. There is no tenderness.   Musculoskeletal: She exhibits no edema.   Neurological: She is alert and oriented to person, place, and time. Gait normal.   Skin: Skin is warm, dry and intact. No rash noted. She is not diaphoretic. Nails show no clubbing.   Psychiatric: She has a normal mood and affect. Her speech is normal and behavior is normal. Judgment and thought content normal. Cognition and memory are normal.   Nursing note and vitals reviewed.      Chemistry        Component Value Date/Time     07/02/2019 1600    K 4.5 07/02/2019 1600     07/02/2019 1600    CO2 22 (L) 07/02/2019 1600    BUN 28 (H) 07/02/2019 1600    CREATININE 1.0 07/02/2019 1600    GLU 91 07/02/2019 1600        Component Value Date/Time    CALCIUM 9.8 07/02/2019 1600    ALKPHOS 61 04/04/2019 1010    AST 21 04/04/2019 1010    ALT 14 04/04/2019 1010    BILITOT 0.8 04/04/2019 1010    ESTGFRAFRICA 58.9 (A) 07/02/2019 1600    EGFRNONAA 51.1 (A) 07/02/2019 1600            Recent Labs   Lab 03/05/18  1224  03/09/18  1510  11/06/18  1530 01/03/19  1008 02/11/19  1647 04/04/19  1010   WBC 16.90 H   < >  --    < > 9.15 7.44  --   --    Hemoglobin 10.6 L   < >  --    < > 12.5 11.7 L  --   --    Hematocrit 30.6 L   < >  --    < > 37.6 35.1 L  --   --    Mean Corpuscular Volume 98   < >  --    < > 100 H 100 H  --   --    Platelets 197   < >  --    < > 229 225  --   --    BNP 60  --  34  --  47  --   --   --    TSH  --   --   --    < >  --  1.672 0.940  --    Cholesterol  --   --   --    < >  --  175  --  184   HDL  --   --   --    < >  --  47  --  55   LDL Cholesterol  --   --   --    < >  --  99.2  --  104.6   Triglycerides  --   --   --    < >  --  144  --  122   Hdl/Cholesterol Ratio  --   --   --    < >  --  26.9  --  29.9    < > = values in this interval not displayed.              Test(s) Reviewed  I have reviewed the following in  detail:  [] Stress test   [] Angiography   [x] Echocardiogram   [x] Labs   [] Other:         Assessment/Plan:   1. Hypertension, essential  Ms. Lai is very sensitive to medications and going slow with her blood pressure medication changes is imperative.  Changing her blood pressure goal to <150/90 is likely more realistic to keep her quality of life and medication intolerance.  If BP remains >150/90, can consider increasing HCTZ to 25 mg but would not go above 25 mg.  Can consider adding an alpha blocker like doxazosin at bedtime.    - IN OFFICE EKG 12-LEAD (to Muse); Future  - NIFEdipine (PROCARDIA-XL) 60 MG (OSM) 24 hr tablet; Take 1 tablet (60 mg total) by mouth once daily.  Dispense: 30 tablet; Refill: 11  - hydroCHLOROthiazide (HYDRODIURIL) 12.5 MG Tab; Take 1 tablet (12.5 mg total) by mouth once daily.  Dispense: 30 tablet; Refill: 11    2. Pure hypercholesterolemia  . No changes    - IN OFFICE EKG 12-LEAD (to Muse); Future    3. Aortic atherosclerosis      4. CKD (chronic kidney disease), stage III    Patient was discussed with but not examined by Dr. Camarena    Follow up in about 6 months (around 4/17/2020).

## 2019-10-17 NOTE — TELEPHONE ENCOUNTER
Patient will call insurance company for alternative drug. She will call back at another time to establish new PCP

## 2019-10-17 NOTE — PROGRESS NOTES
Digital Medicine: Clinician Follow-Up    Received high BP alert - Gerda Lai submitted a reading of 207/76 at 10/17/2019 12:15 AM. Called patient for hypertesion follow-up. She denies s/sx of hypertension, no SOB, CP, blurred vision or headaches. She states she took the reading right before going to bed. She also reports using diclofenac cream for knee pain on yesterday evening. On yesterday, we discussed starting felodipine 2.5 mg for better blood pressure control. She has not started that additional medication yet due to not covered by insurance. She is awaiting response from insurance on formulary coverage. She is scheduled with a cardiology appointment later today.    The history is provided by the patient. No  was used.     Follow Up  Follow-up reason(s): reading review      Alert received.   Care Team received high BP alert.  Patient is not experiencing symptoms.          Sleep Apnea  Patient not previously diagnosed with SADE and         Medication Adherence:   She misses doses: never        INTERVENTION(S)  recommended med change and encouragement/support    PLAN  patient verbalizes understanding    Current 30-day BP average of 190/80 is uncontrolled, does not meet goal of <140/90.  Reviewed readings submitted. DBP consistently controlled; SBP trending upwards and consistently uncontrolled.   Taking spironolactone twice daily: 50 mg in the morning and 25 mg in the evening. Taking labetalol twice daily: 100 mg in the morning and 50 mg in the evening. Taking tekturna once daily in the evening. Insurance does not cover felodipine.  Confirmed device has been charged.    Continue current regimen.  Awaiting feedback from insurance on formulary coverage for receipt of felodipine.   BP reading today was elevated 227/88 without s/sx of hypertension. Instructed patient to take extra labetalol due to SBP>200.   Take device to cardiology appointment today for BP reading comparison. Patient  understands readings may vary by 10%. If reading variation is significantly greater, will take device to [facility] for evaluation/exchange.  Consider increasing labetalol to 200 mg in the morning and 100 mg in the evening if tolerable.     Follow-up tomorrow after cardiology appointment today.       There are no preventive care reminders to display for this patient.    Last 5 Patient Entered Readings                                      Current 30 Day Average: 190/80     Recent Readings 10/17/2019 10/17/2019 10/16/2019 10/16/2019 10/15/2019    SBP (mmHg) 207 207 165 165 207    DBP (mmHg) 76 76 74 74 86    Pulse 56 56 59 59 54             Hypertension Medications             felodipine (PLENDIL) 2.5 MG Tb24 Take 1 tablet (2.5 mg total) by mouth once daily.    labetalol (NORMODYNE) 100 MG tablet TAKE 1 AND 1/2 TABLETS BY MOUTH TWICE A DAY    spironolactone (ALDACTONE) 50 MG tablet Take 1.5 tablets (75 mg total) by mouth once daily for blood pressure.    TEKTURNA 300 mg Tab TAKE 1 TABLET (300 MG TOTAL) BY MOUTH ONCE DAILY.

## 2019-10-17 NOTE — PATIENT INSTRUCTIONS
Start the hydrochlorothiazide 12.5 mg in the morning with your spironolactone and morning labetolol.     Take the nifedipine 60 mg with your evening labetolol.  If you have a lot of leg swelling, please call because I'd rather cut the 60 mg dose in half than stop it altogether.

## 2019-10-18 ENCOUNTER — PATIENT OUTREACH (OUTPATIENT)
Dept: OTHER | Facility: OTHER | Age: 84
End: 2019-10-18

## 2019-10-18 ENCOUNTER — TELEPHONE (OUTPATIENT)
Dept: CARDIOLOGY | Facility: CLINIC | Age: 84
End: 2019-10-18

## 2019-10-18 NOTE — TELEPHONE ENCOUNTER
----- Message from Alisha Stein MA sent at 10/18/2019 12:20 PM CDT -----  Contact: pt      ----- Message -----  From: Tess Dallas  Sent: 10/18/2019  11:41 AM CDT  To: Evelyne Agustin Staff    Pt saw Nikole yesterday and has some questions about her med changes and can be reached at [phone].    Thank you

## 2019-10-18 NOTE — TELEPHONE ENCOUNTER
Patient would like to make provider aware is taking Labetolol as directed by PharmD. Routed to Nikole Stubbs NP as ENRRIQUE BARRETO, APRIL   Tue Oct 1, 2019  5:29 PM  Patient taking differently: Taking 1 tablet (100 mg) in the morning and taking half-tablet (50 mg) in the evening.

## 2019-10-20 ENCOUNTER — PATIENT OUTREACH (OUTPATIENT)
Dept: ADMINISTRATIVE | Facility: OTHER | Age: 84
End: 2019-10-20

## 2019-10-21 ENCOUNTER — OFFICE VISIT (OUTPATIENT)
Dept: ORTHOPEDICS | Facility: CLINIC | Age: 84
End: 2019-10-21
Payer: MEDICARE

## 2019-10-21 ENCOUNTER — PATIENT OUTREACH (OUTPATIENT)
Dept: OTHER | Facility: OTHER | Age: 84
End: 2019-10-21

## 2019-10-21 VITALS
DIASTOLIC BLOOD PRESSURE: 47 MMHG | WEIGHT: 141 LBS | HEIGHT: 63 IN | HEART RATE: 62 BPM | BODY MASS INDEX: 24.98 KG/M2 | SYSTOLIC BLOOD PRESSURE: 103 MMHG

## 2019-10-21 DIAGNOSIS — M70.62 TROCHANTERIC BURSITIS OF LEFT HIP: Primary | ICD-10-CM

## 2019-10-21 DIAGNOSIS — M17.12 PRIMARY OSTEOARTHRITIS OF LEFT KNEE: ICD-10-CM

## 2019-10-21 PROCEDURE — 99999 PR PBB SHADOW E&M-EST. PATIENT-LVL IV: ICD-10-PCS | Mod: PBBFAC,,, | Performed by: PHYSICIAN ASSISTANT

## 2019-10-21 PROCEDURE — 99213 PR OFFICE/OUTPT VISIT, EST, LEVL III, 20-29 MIN: ICD-10-PCS | Mod: 25,S$PBB,, | Performed by: PHYSICIAN ASSISTANT

## 2019-10-21 PROCEDURE — 99999 PR PBB SHADOW E&M-EST. PATIENT-LVL IV: CPT | Mod: PBBFAC,,, | Performed by: PHYSICIAN ASSISTANT

## 2019-10-21 PROCEDURE — 20610 DRAIN/INJ JOINT/BURSA W/O US: CPT | Mod: PBBFAC | Performed by: PHYSICIAN ASSISTANT

## 2019-10-21 PROCEDURE — 99213 OFFICE O/P EST LOW 20 MIN: CPT | Mod: 25,S$PBB,, | Performed by: PHYSICIAN ASSISTANT

## 2019-10-21 PROCEDURE — 20610 PR DRAIN/INJECT LARGE JOINT/BURSA: ICD-10-PCS | Mod: 51,S$PBB,LT, | Performed by: PHYSICIAN ASSISTANT

## 2019-10-21 PROCEDURE — 99214 OFFICE O/P EST MOD 30 MIN: CPT | Mod: PBBFAC | Performed by: PHYSICIAN ASSISTANT

## 2019-10-21 PROCEDURE — 20610 DRAIN/INJ JOINT/BURSA W/O US: CPT | Mod: 51,S$PBB,LT, | Performed by: PHYSICIAN ASSISTANT

## 2019-10-21 RX ORDER — METHYLPREDNISOLONE ACETATE 80 MG/ML
80 INJECTION, SUSPENSION INTRA-ARTICULAR; INTRALESIONAL; INTRAMUSCULAR; SOFT TISSUE
Status: COMPLETED | OUTPATIENT
Start: 2019-10-21 | End: 2019-10-21

## 2019-10-21 RX ADMIN — METHYLPREDNISOLONE ACETATE 80 MG: 80 INJECTION, SUSPENSION INTRALESIONAL; INTRAMUSCULAR; INTRASYNOVIAL; SOFT TISSUE at 07:10

## 2019-10-21 NOTE — PROGRESS NOTES
Digital Medicine: Clinician Follow-Up    Patient is at ortho appointment for steroid injections in her knee pain. Reports BP in clinic as 103/47. She took labetolol only this morning and nifedipine last night at 10p. She will return call after appointment. - Patient reports feeling tired, sluggish, dizzy and weak over the weekend after starting HCTZ and nifedipine for blood pressure management as instructed by Cardiology Evelyne in 10/17/19 office visit. She also reports swelling in legs, ankle and feet from nifedipine. Saturday was the worst day of these symptoms. Patient believes she is getting enough fluids in so symptoms were not 2/2 hypotension but is not able to give an account on the amount of fluids consumed. She feels that she is over-medicated. She has read printout from most recent doctor's appointment along with printout provided with her medications. She is identifying several of the ADRs listed from the paperwork and is now concerned about diagnosis of Stage III CKD that she says no one informed her about. She did not measured her blood pressure over the weekend. She states she did not sleep well last night. She felt poorly enough that she had her niece bring her to her ortho appointment this afternoon as opposed to driving herself.     The history is provided by the patient. No  was used.     Follow Up  Follow-up reason(s): reading review and medication change follow-up      Readings are trending down   Medication Change: new med    Patient started new medication.    Date:  10/18/2019  Is patient tolerating med change?:  No  Reasons:  Complaints of lightheadedness, dizziness, tired, swelling of legs/ankles/feet, not sleeping well          Sleep Apnea  Patient not previously diagnosed with SADE and           INTERVENTION(S)  encouragement/support    PLAN  patient verbalizes understanding, patient amenable to changes and additional monitoring needed    Managing Ms. Lorenz's blood  pressure continues to be a challenge due to complaints of ADRs related to medications prescribed. BP recorded in ortho clinic visit this afternoon as 103/47 with symptoms. She took labetolol only this morning and nifedipine last night at 10p. Due to concerns about her symptoms over the weekend, other BP medications were skipped by patient. No readings recorded since starting new regimen on Friday evening so no evidence of hypotension captured on her device.    Requested she measure BP one hour after lunch today and before taking her evening time medications.   Encouraged patient to have adequate fluids today along with meals to prevent s/sx of hypotension.  Due to concerns of hypotension with symptoms, requested she hold diuretics tonight only. We will review readings tomorrow for path forward.       There are no preventive care reminders to display for this patient.    Last 5 Patient Entered Readings                                      Current 30 Day Average: 188/80     Recent Readings 10/18/2019 10/18/2019 10/17/2019 10/17/2019 10/17/2019    SBP (mmHg) 166 166 184 184 227    DBP (mmHg) 70 70 79 79 88    Pulse 64 64 60 60 63             Hypertension Medications             hydroCHLOROthiazide (HYDRODIURIL) 12.5 MG Tab Take 1 tablet (12.5 mg total) by mouth once daily.    labetalol (NORMODYNE) 100 MG tablet TAKE 1 AND 1/2 TABLETS BY MOUTH TWICE A DAY    NIFEdipine (PROCARDIA-XL) 60 MG (OSM) 24 hr tablet Take 1 tablet (60 mg total) by mouth once daily.    spironolactone (ALDACTONE) 50 MG tablet Take 1.5 tablets (75 mg total) by mouth once daily for blood pressure.    TEKTURNA 300 mg Tab TAKE 1 TABLET (300 MG TOTAL) BY MOUTH ONCE DAILY.

## 2019-10-22 ENCOUNTER — PATIENT OUTREACH (OUTPATIENT)
Dept: OTHER | Facility: OTHER | Age: 84
End: 2019-10-22

## 2019-10-22 NOTE — PROGRESS NOTES
"Digital Medicine: Clinician Follow-Up    10/22/19 note: Called patient for hypertension follow-up. "I feel like a new woman! My blood pressure looks good and those shots really helped my knee pain. Thank you so much for everything you've done to help me." She reports that she held HCTZ and Aldactone on yesterday as instructed due to s/sx hypotension after a reading of 103/47 in ortho clinic on yesterday to receive steroid shots for knee pain. On yesterday evening, she took Tekturna at 6 pm followed by nifedipine 60 mg and labetolol 100 mg at 1030 pm. This morning she took her weekly dose of Fosamax followed by labetolol 100 mg an hour later. No major concerns with leg/feet/ankle swelling due to nifedipine. She understands to elevate feet when home and plans to get compression socks in case needed.     The history is provided by the patient. No  was used.     Follow Up  Follow-up reason(s): reading review      Readings are trending down due to medication adherence.    Medication Change: new med    Patient started new medication.    Date:  10/18/2019  Is patient tolerating med change?:  YesPatient is being instructed to HOLD diuretics due to s/sx of hypotension until further notice.          Sleep Apnea  Patient not previously diagnosed with SADE and           INTERVENTION(S)  recommended med change and encouragement/support    PLAN  patient verbalizes understanding, demonstrates understanding via teach back and patient amenable to changes    BP readings are trending downwards with addition of CCB. Due to issues with hypotension, both HCTZ and spironolactone are being held. Reading today met goal of <140/90 with Tekturna, labetolol and nifedipine regimen.   Patient was instructed to continue to hold diuretics until further notice.   Continue labetalol 100 BID, nifedipine 60 and Tekturna.  Continue to measure BP daily for monitoring.  If BP remains controlled with current regimen, will stop " diuretics. If BP elevates again, will consider once or twice weekly dosing of HCTZ.  No major concerns with leg/feet/ankle swelling due to nifedipine. Instructed to feet when home and get compression socks in case needed.     Patient understands to call if any concerns or increase in BP.     Follow-up on Friday.      There are no preventive care reminders to display for this patient.    Last 5 Patient Entered Readings                                      Current 30 Day Average: 179/77     Recent Readings 10/22/2019 10/22/2019 10/21/2019 10/21/2019 10/18/2019    SBP (mmHg) 127 127 134 134 166    DBP (mmHg) 55 55 56 56 70    Pulse 69 69 70 70 64             Hypertension Medications             hydroCHLOROthiazide (HYDRODIURIL) 12.5 MG Tab Take 1 tablet (12.5 mg total) by mouth once daily. ON HOLD AS OF 10/21/19.    labetalol (NORMODYNE) 100 MG tablet TAKE 1 AND 1/2 TABLETS BY MOUTH TWICE A DAY    NIFEdipine (PROCARDIA-XL) 60 MG (OSM) 24 hr tablet Take 1 tablet (60 mg total) by mouth once daily.    spironolactone (ALDACTONE) 50 MG tablet Take 1.5 tablets (75 mg total) by mouth once daily for blood pressure. ON HOLD AS OF 10/21/19.    TEKTURNA 300 mg Tab TAKE 1 TABLET (300 MG TOTAL) BY MOUTH ONCE DAILY.

## 2019-10-22 NOTE — PROGRESS NOTES
"Digital Medicine: Clinician Follow-Up    10/18/19 note: Patient called to report that MD made changes to her blood pressure medications at visit on yesterday and she is confused about her medication changes and worries that it is too much medication. "Can you help me understand what I'm supposed to be taking?"    The history is provided by the patient. No  was used.     Follow Up  Follow-up reason(s): reading review      Readings are trending up           Sleep Apnea  Patient not previously diagnosed with SADE and           INTERVENTION(S)  encouragement/support    PLAN  patient verbalizes understanding    Reviewed notes from cardiology visit and explained to patient what her current regimen is and how medications should be taken.   Requested measures for monitoring due to medication changes made on yesterday.    Patient understands to call if any additional questions or concerns.   Follow-up in 2 weeks.      There are no preventive care reminders to display for this patient.    Last 5 Patient Entered Readings                                      Current 30 Day Average: 179/77     Recent Readings 10/22/2019 10/22/2019 10/21/2019 10/21/2019 10/18/2019    SBP (mmHg) 127 127 134 134 166    DBP (mmHg) 55 55 56 56 70    Pulse 69 69 70 70 64             Hypertension Medications             hydroCHLOROthiazide (HYDRODIURIL) 12.5 MG Tab Take 1 tablet (12.5 mg total) by mouth once daily.    labetalol (NORMODYNE) 100 MG tablet TAKE 1 AND 1/2 TABLETS BY MOUTH TWICE A DAY    NIFEdipine (PROCARDIA-XL) 60 MG (OSM) 24 hr tablet Take 1 tablet (60 mg total) by mouth once daily.    spironolactone (ALDACTONE) 50 MG tablet Take 1.5 tablets (75 mg total) by mouth once daily for blood pressure.    TEKTURNA 300 mg Tab TAKE 1 TABLET (300 MG TOTAL) BY MOUTH ONCE DAILY.               "

## 2019-10-22 NOTE — PROGRESS NOTES
Subjective:      Patient ID: Gerda Lai is a 86 y.o. female.    Chief Complaint: Pain of the Right Knee; Pain of the Left Knee; and Pain of the Left Hip    HPI  Patient reports bilateral knee pain L>R. She has h/o OA. She had cortisone injection in March that gave her some relief. She uses voltaren gel and takes tylenol. She alternates cane and wheelchair.   Patient also reports left hip pain. Pain is lateral. She has h/o hip bursitis. She had cortisone injection in March that gave her some relief. She does HEP for her legs.   Review of Systems   Constitution: Negative for chills, fever and night sweats.   Cardiovascular: Negative for chest pain.   Respiratory: Negative for cough and shortness of breath.    Hematologic/Lymphatic: Does not bruise/bleed easily.   Skin: Negative for color change.   Gastrointestinal: Negative for heartburn.   Genitourinary: Negative for dysuria.   Neurological: Negative for numbness and paresthesias.   Psychiatric/Behavioral: Negative for altered mental status.   Allergic/Immunologic: Negative for persistent infections.         Objective:            General    Vitals reviewed.  Constitutional: She is oriented to person, place, and time. She appears well-developed and well-nourished.   Cardiovascular: Normal rate.    Neurological: She is alert and oriented to person, place, and time.         Left Hip Exam     Tenderness   The patient tender to palpation of the trochanteric bursa.    Range of Motion   The patient has normal left hip ROM.    Tests   Stinchfield test: negative  Log Roll: negative    Other   Sensation: normal          Bilateral Knee Exam:  ROM 0-120 degrees  No effusion  No warmth or erythema  TTP medial and lateral joint line          X-ray knee: reviewed by myself. Severe DJD.  X-ray hip: reviewed by myself. Right hip arthroplasty identified.  The position alignment is satisfactory and unchanged as compared to the previous study.  Mild DJD involving the left hip.  No  fracture or bone destruction.  Calcification or heterotopic bone formation adjacent to the left greater trochanter identified as before.      Assessment:       Encounter Diagnoses   Name Primary?    Trochanteric bursitis of left hip Yes    Primary osteoarthritis of left knee           Plan:       Discussed treatment options with patient. Continue HEP. She would like left hip bursa and left knee cortisone injections. RTC prn.     PROCEDURE:  I have explained the risks, benefits, and alternatives of the procedure in detail.  The patient voices understanding and all questions have been answered.  The patient agrees to proceed as planned. So after I performed a sterile pre of the skin in the normal fashion the left greater trochanteric area is injected from the lateral approach using an 2 inch 22 gauge needle with a combination of 4cc 1% lidocaine and 80 mg of depo medrol.  The patient is cautioned and immediate relief of pain is secondary to the local anesthetic and will be temporary.  After the anesthetic wears off there may be a increase in pain that may last for a few hours or a few days and they should use ice to help alleviate this flair up of pain.     PROCEDURE:  I have explained the risks, benefits, and alternatives of the procedure in detail.  The patient voices understanding and all questions have been answered.  The patient agrees to proceed as planned. So after I performed a sterile prep of the skin in the normal fashion the left knee is injected using a 22 gauge needle from the anterolateral approach with a combination of 4cc 1% plain lidocaine and 80 mg of depo medrol.  The patient is cautioned and immediate relief of pain is secondary to the local anesthetic and will be temporary.  After the anesthetic wears off there may be a increase in pain that may last for a few hours or a few days and they should use ice to help alleviate this flair up of pain.

## 2019-10-23 ENCOUNTER — PATIENT OUTREACH (OUTPATIENT)
Dept: OTHER | Facility: OTHER | Age: 84
End: 2019-10-23

## 2019-10-24 NOTE — PROGRESS NOTES
Digital Medicine: Clinician Follow-Up    Called patient for hypertension check-in. Patient is very pleased with blood pressure readings since starting the CCB nifedipine 60 mg but does reports ADRs of flushing, leg/ankle swelling, lips peeling, dry mouth with tenderness. She reports symptoms are tolerable and she will continue to monitor over the weekend.     The history is provided by the patient. No  was used.     Follow Up  Follow-up reason(s): reading review and medication change follow-up      Readings are trending down due to medication adherence.    Medication Change: new med    Patient started new medication.    Date:  10/18/2019  Is patient tolerating med change?:  No          Sleep Apnea  Patient not previously diagnosed with SADE and           INTERVENTION(S)  encouragement/support    PLAN  patient verbalizes understanding and patient amenable to changes    BP readings trending downward since starting CCB nifedipine 60 mg.   SBP and DBP are controlled, meeting goal thus far.   Encouraged patient to purchase compression socks to reduce leg/ankle swelling. Elevate feet during day when home relaxing.  Provided website information and local retail pharmacy where compression socks are sold per search.  Discussed if reading packet inserts of drug ADRs is causing anxiety then leading to some of her ADRs experienced.    Continue current regimen of nifedipine, Tekturna, and labetalol.  HOLD HCTZ and spironolactone to prevent hypotension.  Advised patient to hydrate for dry mouth and lips to rule out dehydration. Use lip balm to resolve dry lips.  Continue to monitor symptoms of dry mouth, flushing and swelling.  If swelling and flushing continue to be a concern, consider reducing nifedipine to 30 mg.    Follow-up in 1 week.      There are no preventive care reminders to display for this patient.    Last 5 Patient Entered Readings                                      Current 30 Day Average:  175/76     Recent Readings 10/23/2019 10/23/2019 10/22/2019 10/22/2019 10/21/2019    SBP (mmHg) 136 136 127 127 134    DBP (mmHg) 61 61 55 55 56    Pulse 64 64 69 69 70             Hypertension Medications             hydroCHLOROthiazide (HYDRODIURIL) 12.5 MG Tab Take 1 tablet (12.5 mg total) by mouth once daily.    labetalol (NORMODYNE) 100 MG tablet TAKE 1 AND 1/2 TABLETS BY MOUTH TWICE A DAY    NIFEdipine (PROCARDIA-XL) 60 MG (OSM) 24 hr tablet Take 1 tablet (60 mg total) by mouth once daily.    spironolactone (ALDACTONE) 50 MG tablet Take 1.5 tablets (75 mg total) by mouth once daily for blood pressure.    TEKTURNA 300 mg Tab TAKE 1 TABLET (300 MG TOTAL) BY MOUTH ONCE DAILY.

## 2019-10-28 ENCOUNTER — PATIENT OUTREACH (OUTPATIENT)
Dept: OTHER | Facility: OTHER | Age: 84
End: 2019-10-28

## 2019-10-28 NOTE — PROGRESS NOTES
"Digital Medicine: Clinician Follow-Up    Patient called to report constipation that she believes is due to the nifedipine which was added on 10/17/19 for uncontrolled blood pressure. She took Doculax yesterday which did prompt a bowel movement but she did not feel that she had a complete bowel emptying. She has not had Doculax today. She reports feeling tired, weak and as if she needs to throw up. She thinks that she is drinking enough fluids and constipation is not due to dehydration. She reports eating an apple a day and salads regularly. "I recall reading constipation was one of the side effects of the nifedipine." She called seeking instructions on what to do.     The history is provided by the patient. No  was used.     Follow Up  Follow-up reason(s): reading review      Readings are trending down due to medication adherence and BP has been significantly beeter since starting CCB nifedipine.        Assessment/Plan    INTERVENTION(S)  encouragement/support    PLAN  patient verbalizes understanding and patient amenable to changes    BP has been significantly better since starting CCB nifedipine 60 mg every evening; however today, Ms. Lorenz complains of constipation due to the medication. On Thursday 10/24, she complained of ADRs of flushing, leg/ankle swelling, lips peeling, dry mouth with tenderness where she was instructed to increase her fluid intake to rule out dehydration.     Advised patient to take Doculax again today, drink plenty of water to ensure she is not dehydrated, increase fiber intake by eating apple and drinking Metamucil. She was also instructed to HOLD nifedipine tonight. Will check-in on tomorrow.  Patient understands that she can call if she has additional questions or concerns.    Follow-up in 1 day.      There are no preventive care reminders to display for this patient.    Last 5 Patient Entered Readings                                      Current 30 Day Average: " 171/76     Recent Readings 10/26/2019 10/26/2019 10/24/2019 10/24/2019 10/23/2019    SBP (mmHg) 141 141 150 150 136    DBP (mmHg) 75 75 70 70 61    Pulse 63 63 66 66 64             Hypertension Medications             hydroCHLOROthiazide (HYDRODIURIL) 12.5 MG Tab Take 1 tablet (12.5 mg total) by mouth once daily.    labetalol (NORMODYNE) 100 MG tablet TAKE 1 AND 1/2 TABLETS BY MOUTH TWICE A DAY    NIFEdipine (PROCARDIA-XL) 60 MG (OSM) 24 hr tablet Take 1 tablet (60 mg total) by mouth once daily.    spironolactone (ALDACTONE) 50 MG tablet Take 1.5 tablets (75 mg total) by mouth once daily for blood pressure.    TEKTURNA 300 mg Tab TAKE 1 TABLET (300 MG TOTAL) BY MOUTH ONCE DAILY.

## 2019-10-29 ENCOUNTER — PATIENT OUTREACH (OUTPATIENT)
Dept: OTHER | Facility: OTHER | Age: 84
End: 2019-10-29

## 2019-10-29 ENCOUNTER — PATIENT OUTREACH (OUTPATIENT)
Dept: ADMINISTRATIVE | Facility: OTHER | Age: 84
End: 2019-10-29

## 2019-10-29 NOTE — PROGRESS NOTES
Patient called to say that she hasn't measured her blood pressure yet but she feels much better. She had a bowel movement on yesterday. She states she is eating apples for fiber and trying to drink more water. She will call me back with her reading later.

## 2019-10-30 NOTE — PROGRESS NOTES
Digital Medicine: Clinician Follow-Up    Called patient for hypertension follow-up. Mrs. Lorenz continues to be a very interesting patient to manage due to her intolerance to several antihypertensive medications.  Antihypertensive medication tolerance has continued to be a challenge for managing Ms. Lorenz's blood pressure. HCTZ and spironolactone are currently being held due to complaints of hypotension (10/21/19) with lightheadedness and dizziness within days after starting nifedipine. No negative impact to her blood pressure was seen with holding diuretics. The effectiveness of Tekturna is still in question but she has taken it for several years now and seems to be one that she tolerates without complaints. Low dose Labetalol continues to work well for her where she has recently taken an extra dose due to elevated BP without complaints of tiredness, fatigue, or dizziness. Since starting nifedipine 60 mg once daily on 10/17/19, the patient's blood pressure has been signifcantly lower and meeting suggested goal of <150/90 by Cardiologist Evelyne. Unfortunately, Ms. Lorenz has experienced issues of lightheadedness, dizziness, constipation and mouth sore with nifedipine which she states have resolved with holding nifedipine 60 mg daily over the last 2 days; however, her blood pressure is now elevated again measuring 184/82 on yesterday evening without nifedipine on board. She admits that she does not like having the elevated readings and it makes her worry. She states that she is continuing to drink Metamucil daily which she has done for years now and has Ducolax at home if needed. She also states that she is drinking water as instructed to prevent dehydration.    The history is provided by the patient. No  was used.     Follow Up  Follow-up reason(s): reading review and medication change follow-up      Readings are trending up due to medication adherence and Currently holding HCTZ, spironolactone  and nifedipine. No negative impact was seen with holding HCTZ and spironolactone. BP is starting to increase again without nifedipine on board.    Medication Change: stop therapy    Is patient tolerating med change?:  No  Reasons:  While ADRs have resolved with holding nifedipine, BP is elevated >180 again.      INTERVENTION(S)  reviewed appropriate dose schedule, recommended med change and encouragement/support    PLAN  patient verbalizes understanding, patient amenable to changes and continue monitoring    Most adverse effects of CCBs are dose dependent, so a decrease in dose is a possible way to improve her tolerability. Also, patients who do not tolerate dihydropyridines may tolerate a change to non-dihydropyridines. Note verapamil is listed as an allergy due to causing palpitations. In reviewing her chart, she has also tolerated IV nicardipine 40 mg/200 mL infusion for uncontrolled hypertensive inpatient. Diuretics have been held since 10/21/19 with no negative impact to her blood pressure. It appears that with CCB on board there is no need for diuretic to manage her blood pressure.     Today: Measure BP at 11 am. If BP is greater than 150/90, take extra labetalol 100 mg tablet. Resume nifedipine this evening at decrease dose as instructed.  Decrease dose to nifedipine 30 mg daily. Instructed patient to take nifedipine 60 mg EVERY OTHER DAY to achieve this dose.   Continue to HOLD HCTZ and spironolactone. Consider discontinuing as it appears BP can be effectively controlled with CCB.  Continue labetalol 100 mg in the morning and 50 mg in the evening.  Continue Tekturna 300 mg in the evening.  Drink at 64 oz of water daily.  Continue metamucil daily.  Continue Ducolax if needed.  Patient will advise if ADRs occur again with restart of nifedipine.     If patient proves to be intolerable to reduced dose of nifedipine, consider increasing labetalol to 100 mg BID. Monitor for complaints for fatigue and tiredness.  Max dose allowed is 2,400 mg daily. (Note: Patient is currently taking 100 mg in the morning and 50 mg in the evening.)   Can also consider low dose nicardipine since nicardipine 40mg IV was well tolerated during inpatient use for hypertension.     Follow-up in 2 days.      There are no preventive care reminders to display for this patient.    Last 5 Patient Entered Readings                                      Current 30 Day Average: 166/75     Recent Readings 10/29/2019 10/29/2019 10/28/2019 10/28/2019 10/26/2019    SBP (mmHg) 184 184 139 139 141    DBP (mmHg) 82 82 65 65 75    Pulse 65 65 64 64 63             Hypertension Medications             hydroCHLOROthiazide (HYDRODIURIL) 12.5 MG Tab Take 1 tablet (12.5 mg total) by mouth once daily.    labetalol (NORMODYNE) 100 MG tablet TAKE 1 AND 1/2 TABLETS BY MOUTH TWICE A DAY    NIFEdipine (PROCARDIA-XL) 60 MG (OSM) 24 hr tablet Take 1 tablet (60 mg total) by mouth once daily.    spironolactone (ALDACTONE) 50 MG tablet Take 1.5 tablets (75 mg total) by mouth once daily for blood pressure.    TEKTURNA 300 mg Tab TAKE 1 TABLET (300 MG TOTAL) BY MOUTH ONCE DAILY.                         Medication Affordability Screening    This is preventing medication adherence.

## 2019-10-31 ENCOUNTER — OFFICE VISIT (OUTPATIENT)
Dept: SPORTS MEDICINE | Facility: CLINIC | Age: 84
End: 2019-10-31
Payer: MEDICARE

## 2019-10-31 ENCOUNTER — HOSPITAL ENCOUNTER (OUTPATIENT)
Dept: RADIOLOGY | Facility: HOSPITAL | Age: 84
Discharge: HOME | End: 2019-10-31
Attending: ORTHOPAEDIC SURGERY
Payer: MEDICARE

## 2019-10-31 VITALS
BODY MASS INDEX: 24.98 KG/M2 | SYSTOLIC BLOOD PRESSURE: 95 MMHG | HEIGHT: 63 IN | DIASTOLIC BLOOD PRESSURE: 60 MMHG | WEIGHT: 141 LBS | HEART RATE: 59 BPM

## 2019-10-31 DIAGNOSIS — M25.511 RIGHT SHOULDER PAIN, UNSPECIFIED CHRONICITY: ICD-10-CM

## 2019-10-31 DIAGNOSIS — M19.011 DJD OF RIGHT SHOULDER: ICD-10-CM

## 2019-10-31 DIAGNOSIS — M25.511 RIGHT SHOULDER PAIN, UNSPECIFIED CHRONICITY: Primary | ICD-10-CM

## 2019-10-31 PROCEDURE — 99999 PR PBB SHADOW E&M-EST. PATIENT-LVL III: ICD-10-PCS | Mod: PBBFAC,,, | Performed by: ORTHOPAEDIC SURGERY

## 2019-10-31 PROCEDURE — 99203 OFFICE O/P NEW LOW 30 MIN: CPT | Mod: S$PBB,,, | Performed by: ORTHOPAEDIC SURGERY

## 2019-10-31 PROCEDURE — 73030 X-RAY EXAM OF SHOULDER: CPT | Mod: TC,RT

## 2019-10-31 PROCEDURE — 99203 PR OFFICE/OUTPT VISIT, NEW, LEVL III, 30-44 MIN: ICD-10-PCS | Mod: S$PBB,,, | Performed by: ORTHOPAEDIC SURGERY

## 2019-10-31 PROCEDURE — 99213 OFFICE O/P EST LOW 20 MIN: CPT | Mod: PBBFAC,25 | Performed by: ORTHOPAEDIC SURGERY

## 2019-10-31 PROCEDURE — 73030 XR SHOULDER COMPLETE 2 OR MORE VIEWS RIGHT: ICD-10-PCS | Mod: 26,RT,, | Performed by: RADIOLOGY

## 2019-10-31 PROCEDURE — 99999 PR PBB SHADOW E&M-EST. PATIENT-LVL III: CPT | Mod: PBBFAC,,, | Performed by: ORTHOPAEDIC SURGERY

## 2019-10-31 PROCEDURE — 73030 X-RAY EXAM OF SHOULDER: CPT | Mod: 26,RT,, | Performed by: RADIOLOGY

## 2019-10-31 NOTE — PROGRESS NOTES
CC: RIGHT shoulder pain     86 y.o. Female with a history of right shoulder pain for over 15 years (had a fall/FOOSH).  Recently completed physical therapy; reports improvement in pain.  No improvement in pain. Currently taking Voltaren tablets.    She reports that the pain and weakness is worse with overhead activity. It also bothers her at night.    Is affecting ADLs.  Pain is 1/10 at it's worst.      Past Medical History:   Diagnosis Date    Arthritis     Basal cell carcinoma 09/2016    right post auricular neck     Breast cancer 1992    right    Cataract     Fibromyalgia     History of measles as a child     Hyperlipidemia     Hypertension     Personal history of colonic polyps     Pneumonia     SCC (squamous cell carcinoma) 2015    R chest    SCC (squamous cell carcinoma) 2016    left medial shoulder    SCC (squamous cell carcinoma) 2017    left knee    Shingles     Squamous cell carcinoma 2015    right forearm    Thyroid disease     Vaginitis        Past Surgical History:   Procedure Laterality Date    ADENOIDECTOMY      BREAST BIOPSY Left     Excisional bx, benign    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    CATARACT EXTRACTION W/  INTRAOCULAR LENS IMPLANT Bilateral     EYE SURGERY      JOINT REPLACEMENT      thyriodectomy      partial    tonsillectomy      TONSILLECTOMY      TOTAL HIP ARTHROPLASTY      right    Yag  Left        Family History   Problem Relation Age of Onset    Breast cancer Mother     Cancer Mother     Stroke Paternal Grandfather     Heart disease Father     Cancer Paternal Aunt     Heart disease Paternal Aunt     Glaucoma Paternal Grandmother     Amblyopia Neg Hx     Blindness Neg Hx     Cataracts Neg Hx     Diabetes Neg Hx     Hypertension Neg Hx     Macular degeneration Neg Hx     Retinal detachment Neg Hx     Strabismus Neg Hx     Thyroid disease Neg Hx     Melanoma Neg Hx          Current Outpatient Medications:     acetaminophen (TYLENOL) 650  MG TbSR, Take 650 mg by mouth as needed (pain). , Disp: , Rfl:     alendronate (FOSAMAX) 35 MG tablet, Take 1 tablet (35 mg total) by mouth every 7 days., Disp: 4 tablet, Rfl: 11    B INFANTIS/B ANI/B JOSE/B BIFID (PROBIOTIC 4X ORAL), Take 1 tablet by mouth daily, Disp: , Rfl:     coenzyme Q10 (CO Q-10) 100 mg capsule, Take 100 mg by mouth once daily., Disp: , Rfl:     diclofenac sodium (VOLTAREN) 1 % Gel, Apply topically 2 (two) times daily., Disp: 1 Tube, Rfl: 6    glucosamine-chondroitin 500-400 mg tablet, Take 1 tablet by mouth once daily. , Disp: , Rfl:     ipratropium (ATROVENT) 0.03 % nasal spray, 2 sprays by Nasal route 2 (two) times daily., Disp: , Rfl:     labetalol (NORMODYNE) 100 MG tablet, TAKE 1 AND 1/2 TABLETS BY MOUTH TWICE A DAY, Disp: 90 tablet, Rfl: 2    LORazepam (ATIVAN) 0.5 MG tablet, Take 0.5 tablets (0.25 mg total) by mouth 2 (two) times daily. Prn severe anxiety, Disp: 30 tablet, Rfl: 0    multivitamin capsule, Take 1 capsule by mouth once daily., Disp: , Rfl:     NIFEdipine (PROCARDIA-XL) 60 MG (OSM) 24 hr tablet, Take 1 tablet (60 mg total) by mouth once daily., Disp: 30 tablet, Rfl: 11    omeprazole (PRILOSEC OTC) 20 MG tablet, Take 20 mg by mouth once daily.  , Disp: , Rfl:     pravastatin (PRAVACHOL) 40 MG tablet, TAKE 1 TABLET (40 MG TOTAL) BY MOUTH ONCE DAILY., Disp: 90 tablet, Rfl: 3    TEKTURNA 300 mg Tab, TAKE 1 TABLET (300 MG TOTAL) BY MOUTH ONCE DAILY., Disp: 90 tablet, Rfl: 2    TURMERIC ORAL, Take 500 mg by mouth 2 (two) times daily. , Disp: , Rfl:     hydroCHLOROthiazide (HYDRODIURIL) 12.5 MG Tab, Take 1 tablet (12.5 mg total) by mouth once daily. (Patient not taking: Reported on 10/31/2019), Disp: 30 tablet, Rfl: 11    spironolactone (ALDACTONE) 50 MG tablet, Take 1.5 tablets (75 mg total) by mouth once daily for blood pressure. (Patient not taking: Reported on 10/31/2019), Disp: 45 tablet, Rfl: 3    Review of patient's allergies indicates:   Allergen Reactions  "   Sulfa (sulfonamide antibiotics) Rash    Clarithromycin Other (See Comments)     Weak, extreme fatigue. dizziness    Flexeril [cyclobenzaprine] Other (See Comments)     Dizziness    Iodine and iodide containing products     Lisinopril Other (See Comments)     Cough and sensation of throat swelling/?angioedema    Losartan Rash    Metoprolol Swelling     Tightness in throat    Tramadol Other (See Comments)     Dizzy and weak    Verapamil (bulk) Palpitations    Voltaren [diclofenac sodium] Other (See Comments)     Drops blood pressure          REVIEW OF SYSTEMS:  Constitution: Negative. Negative for chills, fever and night sweats.   HENT: Negative for congestion and headaches.    Eyes: Negative for blurred vision, left vision loss and right vision loss.   Cardiovascular: Negative for chest pain and syncope.   Respiratory: Negative for cough and shortness of breath.    Endocrine: Negative for polydipsia, polyphagia and polyuria.   Hematologic/Lymphatic: Negative for bleeding problem. Does not bruise/bleed easily.   Skin: Negative for dry skin, itching and rash.   Musculoskeletal: Negative for falls.  Positive for right shoulder pain and muscle weakness.   Gastrointestinal: Negative for abdominal pain and bowel incontinence.   Genitourinary: Negative for bladder incontinence and nocturia.   Neurological: Negative for disturbances in coordination, loss of balance and seizures.   Psychiatric/Behavioral: Negative for depression. The patient does not have insomnia.    Allergic/Immunologic: Negative for hives and persistent infections.      PHYSICAL EXAMINATION:  Vitals:  BP 95/60   Pulse (!) 59   Ht 5' 3" (1.6 m)   Wt 64 kg (141 lb)   BMI 24.98 kg/m²    General: The patient is alert and oriented x 3.  Mood is pleasant.  Observation of ears, eyes and nose reveal no gross abnormalities.  No labored breathing observed.  Gait is coordinated. Patient can toe walk and heel walk without difficulty.      RIGHT " SHOULDER / UPPER EXTREMITY EXAM    OBSERVATION:     Swelling  none  Deformity  none   Discoloration  none   Scapular winging none   Scars   none  Atrophy  none    TENDERNESS / CREPITUS (T/C):          T/C      T/C   Clavicle   -/-  SUPRAspinatus    -/-     AC Jt.    +/-  INFRAspinatus  -/-    SC Jt.    -/-  Deltoid    -/-      G. Tuberosity  -/-  LH BICEP groove  -/-   Acromion:  -/-  Midline Neck   -/-     Scapular Spine -/-  Trapezium   -/-   SMA Scapula  -/-  GH jt. line - post  -/-     Scapulothoracic  -/-         ROM: (* = with pain)  Left shoulder   Right shoulder        AROM (PROM)   AROM (PROM)   FE    170° (175°)     30° (150°) *     ER at 90° ABD  90°  (90°)    45°  (50°) *   IR (spine level)   T10     0    STRENGTH: (* = with pain) Left shoulder   Right shoulder   SCAPTION   5/5    3+/5    IR    5/5    4/5   ER    5/5    4/5   BICEPS   5/5    4/5   Deltoid    5/5    4+/5     SIGNS:  Painful side       NEER   +    OSHAHZADS  neg    SELF   +    SPEEDS  neg     DROP ARM   -   BELLY PRESS neg   Superior escape none    LIFT-OFF  neg   X-Body ADD    neg    MOVING VALGUS neg        STABILITY TESTING    Left shoulder   Right shoulder    Translation     Anterior  up face     up face    Posterior  up face    up face    Sulcus   < 10mm    < 10 mm     Signs   Apprehension   neg      neg       Relocation   no change     no change      Jerk test  neg     neg    EXTREMITY NEURO-VASCULAR EXAM:    Sensation grossly intact to light touch all dermatomal regions.    DTR 2+ Biceps, Triceps, BR and Negative Rashids sign   Grossly intact motor function at Elbow, Wrist and Hand   Distal pulses radial and ulnar 2+, brisk cap refill, symmetric.      NECK:  Painless FROM and spinous processes non-tender. Negative Spurlings sign.      OTHER FINDINGS:  + scapular dyskinesia    XRAYS (10/31/19): There is baseline DJD.  No fracture, dislocation or bone destruction seen.      ASSESSMENT:   Right shoulder pain, DJD, RC  tear    PLAN:  1. MRI without contrast to evaluate RC tear  2. CT without contrast to evaluate GH bone quality  3. Follow up in clinic to discuss MRI and CT results    All questions were answered, patient will contact us for questions or concerns in the interim.

## 2019-10-31 NOTE — LETTER
October 31, 2019      Bianca Hu PA-C  [address]  [facility] [address]           [facility]  [address]  [facility] [address]  Phone: [phone]          Patient: Gerda Lai   MR Number: [id number]   YOB: 1933   Date of Visit: 10/31/2019       Dear Bianca Hu:    Thank you for referring Gerda Lai to me for evaluation. Attached you will find relevant portions of my assessment and plan of care.    If you have questions, please do not hesitate to call me. I look forward to following Gerda Lai along with you.    Sincerely,    Mk Garces MD    Enclosure  CC:  No Recipients    If you would like to receive this communication electronically, please contact [email or web address] or [phone] to request more information on [facility] access.    For providers and/or their staff who would like to refer a patient to [facility], please contact us through our one-stop-shop provider referral line, [facility], at [phone].    If you feel you have received this communication in error or would no longer like to receive these types of communications, please e-mail [email or web address]

## 2019-11-01 ENCOUNTER — PATIENT OUTREACH (OUTPATIENT)
Dept: OTHER | Facility: OTHER | Age: 84
End: 2019-11-01

## 2019-11-01 NOTE — PROGRESS NOTES
"Digital Medicine: Clinician Follow-Up    Patient called to report recorded BP 95/60 in Sports Medicine on 10/31/19. She restarted the nifedipine as instructed but her recall on which nights she took it was uncertain. On Tuesday 10/29, she was instructed to follow an every other night administration of nifedipine to reduce the medication dose, control her blood pressure and decrease the likelihood of ADRs that she complained about. She reports feeling unsteady with her gait and expressed concerns about taking nifedipine while she is traveling out of town this weekend. No complaints of fatigue, tiredness, itching, or constipation. Patient does not believe low reading in ortho was not 2/2 dehydration as she "feels like she is swimming due to drinking so much water." She is still holding HCTZ and spironolactone as instructed. Ms. Lorenz called seeking advise on how to manage her blood pressure while away.      The history is provided by the patient. No  was used.     Follow Up  Follow-up reason(s): reading review      Readings are trending down due to medication adherence and Blood pressure readings are significantly beeter with CCB nifedipine on board.        INTERVENTION(S)  encouragement/support    PLAN  patient verbalizes understanding, patient amenable to changes and continue monitoring    BP is significantly better with CCB nifedipine on board; however, patient continues to express complaints or concerns about ADRs experienced with nifedipine and now lower readings obtained.     Advised patient to take nifedipine if SBP reaches 180-200 while traveling; otherwise, okay to hold in order to reduce her concerns.  Determine if she is dosing nightly or every other night as instructed after recent complaints of ADRs. If dosing every other night but still having issue or concerns, consider different CCB or less frequent dosing of nifedipine.  Confirm BP device has been charged within the last " month.      Follow-up on Monday when she returns home.       There are no preventive care reminders to display for this patient.    Last 5 Patient Entered Readings                                      Current 30 Day Average: 164/75     Recent Readings 10/30/2019 10/30/2019 10/29/2019 10/29/2019 10/28/2019    SBP (mmHg) 144 144 184 184 139    DBP (mmHg) 77 77 82 82 65    Pulse 53 53 65 65 64             Hypertension Medications             hydroCHLOROthiazide (HYDRODIURIL) 12.5 MG Tab Take 1 tablet (12.5 mg total) by mouth once daily.    labetalol (NORMODYNE) 100 MG tablet TAKE 1 AND 1/2 TABLETS BY MOUTH TWICE A DAY    NIFEdipine (PROCARDIA-XL) 60 MG (OSM) 24 hr tablet Take 1 tablet (60 mg total) by mouth once daily.    spironolactone (ALDACTONE) 50 MG tablet Take 1.5 tablets (75 mg total) by mouth once daily for blood pressure.    TEKTURNA 300 mg Tab TAKE 1 TABLET (300 MG TOTAL) BY MOUTH ONCE DAILY.                 Screenings

## 2019-11-04 ENCOUNTER — PATIENT OUTREACH (OUTPATIENT)
Dept: ADMINISTRATIVE | Facility: OTHER | Age: 84
End: 2019-11-04

## 2019-11-07 ENCOUNTER — PATIENT OUTREACH (OUTPATIENT)
Dept: OTHER | Facility: OTHER | Age: 84
End: 2019-11-07

## 2019-11-07 ENCOUNTER — OFFICE VISIT (OUTPATIENT)
Dept: OPTOMETRY | Facility: CLINIC | Age: 84
End: 2019-11-07
Payer: MEDICARE

## 2019-11-07 DIAGNOSIS — Z13.5 SCREENING FOR GLAUCOMA: ICD-10-CM

## 2019-11-07 DIAGNOSIS — H04.123 DRY EYES, BILATERAL: Primary | ICD-10-CM

## 2019-11-07 DIAGNOSIS — H52.4 PRESBYOPIA: ICD-10-CM

## 2019-11-07 PROCEDURE — 99999 PR PBB SHADOW E&M-EST. PATIENT-LVL II: ICD-10-PCS | Mod: PBBFAC,,, | Performed by: OPTOMETRIST

## 2019-11-07 PROCEDURE — 92014 PR EYE EXAM, EST PATIENT,COMPREHESV: ICD-10-PCS | Mod: S$PBB,,, | Performed by: OPTOMETRIST

## 2019-11-07 PROCEDURE — 92015 PR REFRACTION: ICD-10-PCS | Mod: ,,, | Performed by: OPTOMETRIST

## 2019-11-07 PROCEDURE — 99212 OFFICE O/P EST SF 10 MIN: CPT | Mod: PBBFAC,PO | Performed by: OPTOMETRIST

## 2019-11-07 PROCEDURE — 92014 COMPRE OPH EXAM EST PT 1/>: CPT | Mod: S$PBB,,, | Performed by: OPTOMETRIST

## 2019-11-07 PROCEDURE — 99999 PR PBB SHADOW E&M-EST. PATIENT-LVL II: CPT | Mod: PBBFAC,,, | Performed by: OPTOMETRIST

## 2019-11-07 PROCEDURE — 92015 DETERMINE REFRACTIVE STATE: CPT | Mod: ,,, | Performed by: OPTOMETRIST

## 2019-11-07 NOTE — PROGRESS NOTES
"Digital Medicine: Health  Follow-Up    Ms. Lorenz was not doing very well today. She had many complaints of side effects on her new medication change.     The history is provided by the patient.     Follow Up  Follow-up reason(s): reading review          INTERVENTION(S)  recommend physical activity and encouragement/support    PLAN  patient verbalizes understanding and Clinician follow-up      There are no preventive care reminders to display for this patient.    Last 5 Patient Entered Readings                                      Current 30 Day Average: 161/73     Recent Readings 11/7/2019 11/7/2019 11/6/2019 11/6/2019 10/30/2019    SBP (mmHg) 141 141 185 185 144    DBP (mmHg) 68 68 84 84 77    Pulse 70 70 59 59 53            Diet Screening       Patient reports that she thinks she's doing well with her diet.     Physical Activity Screening   When asked if exercising, patient responded: no  Patient has the following chronic pain: arthritis and shoulder issues    She identified the following barriers to physical activity: pain/injury/recent surgery and motivation    Patient reports that she knows going to the spa for water aerobics would help, but she only did that once. She said that she felt great after going. Encouraged her to form a routine and offered my help with a pool workout program.     Patient reports that she's having a lot of issues with her shoulders recently and may have a shoulder replacement. Patient is set up for MRIs, and she may do PT again before surgery.    Medication Adherence Screening       Patient reports that the side effects from the medication "is killing her". She's having knee and calf pain, feet swelling, a film in her mouth, and trouble walking. Will set a task for her clinician, April.         "

## 2019-11-07 NOTE — PROGRESS NOTES
Called Ms. Gerda for hypertension follow-up. Patient complained of constipation but was in a movie and couldn't complete call.   Advised her to drink fluids, take a Doculax, continue Metamucil and other fiber intake to promote bowel movement.     Patient agreed. She will call back to discuss further on Monday.

## 2019-11-07 NOTE — PROGRESS NOTES
HPI     DLS; 8/14/18  Pt states no VA problems, states months ago she put some cream on her leg   and then wiped her eye and states her eye got irritated but Is better but   wanted to get it looked to make sure she did nto harm her left eye. Pt   states she is on new meds NIFEDIPINE and states that it does mess with her   VA just a little would like to talk about that.   No f/f  OTC AT gtts     Last edited by Letha Villela MA on 11/7/2019  1:07 PM. (History)        ROS     Positive for: Eyes (cat surgery OU)    Negative for: Constitutional, Gastrointestinal, Neurological, Skin,   Genitourinary, Musculoskeletal, HENT, Endocrine, Cardiovascular,   Respiratory, Psychiatric, Allergic/Imm, Heme/Lymph    Last edited by Luis Daniel Linares, OD on 11/7/2019  1:15 PM. (History)        Assessment /Plan     For exam results, see Encounter Report.    Dry eyes, bilateral    Screening for glaucoma    Presbyopia        1. Mild pco OD sp pciol ou/YAG OS--pt happy w spex Rx  2. DAMION/rosacea--pt to cont w ATs       Plan:    rtc 1 yr

## 2019-11-11 ENCOUNTER — HOSPITAL ENCOUNTER (OUTPATIENT)
Dept: RADIOLOGY | Facility: HOSPITAL | Age: 84
Discharge: HOME | End: 2019-11-11
Attending: ORTHOPAEDIC SURGERY
Payer: MEDICARE

## 2019-11-11 DIAGNOSIS — M25.511 RIGHT SHOULDER PAIN, UNSPECIFIED CHRONICITY: ICD-10-CM

## 2019-11-11 PROCEDURE — 73200 CT UPPER EXTREMITY W/O DYE: CPT | Mod: 26,RT,, | Performed by: RADIOLOGY

## 2019-11-11 PROCEDURE — 73221 MRI SHOULDER WITHOUT CONTRAST RIGHT: ICD-10-PCS | Mod: 26,RT,, | Performed by: RADIOLOGY

## 2019-11-11 PROCEDURE — 73221 MRI JOINT UPR EXTREM W/O DYE: CPT | Mod: 26,RT,, | Performed by: RADIOLOGY

## 2019-11-11 PROCEDURE — 73200 CT SHOULDER WITHOUT CONTRAST RIGHT: ICD-10-PCS | Mod: 26,RT,, | Performed by: RADIOLOGY

## 2019-11-11 PROCEDURE — 73221 MRI JOINT UPR EXTREM W/O DYE: CPT | Mod: TC,RT

## 2019-11-11 PROCEDURE — 73200 CT UPPER EXTREMITY W/O DYE: CPT | Mod: TC,RT

## 2019-11-13 ENCOUNTER — PATIENT OUTREACH (OUTPATIENT)
Dept: ADMINISTRATIVE | Facility: OTHER | Age: 84
End: 2019-11-13

## 2019-11-14 ENCOUNTER — OFFICE VISIT (OUTPATIENT)
Dept: SPORTS MEDICINE | Facility: CLINIC | Age: 84
End: 2019-11-14
Payer: MEDICARE

## 2019-11-14 VITALS
BODY MASS INDEX: 24.98 KG/M2 | HEART RATE: 67 BPM | HEIGHT: 63 IN | WEIGHT: 141 LBS | DIASTOLIC BLOOD PRESSURE: 58 MMHG | SYSTOLIC BLOOD PRESSURE: 120 MMHG

## 2019-11-14 DIAGNOSIS — M25.511 CHRONIC RIGHT SHOULDER PAIN: Primary | ICD-10-CM

## 2019-11-14 DIAGNOSIS — M19.011 DJD OF RIGHT SHOULDER: ICD-10-CM

## 2019-11-14 DIAGNOSIS — M75.100 ROTATOR CUFF TEAR: ICD-10-CM

## 2019-11-14 DIAGNOSIS — G89.29 CHRONIC RIGHT SHOULDER PAIN: Primary | ICD-10-CM

## 2019-11-14 PROCEDURE — 99999 PR PBB SHADOW E&M-EST. PATIENT-LVL III: ICD-10-PCS | Mod: PBBFAC,,, | Performed by: ORTHOPAEDIC SURGERY

## 2019-11-14 PROCEDURE — 99213 OFFICE O/P EST LOW 20 MIN: CPT | Mod: PBBFAC | Performed by: ORTHOPAEDIC SURGERY

## 2019-11-14 PROCEDURE — 99214 OFFICE O/P EST MOD 30 MIN: CPT | Mod: S$PBB,,, | Performed by: ORTHOPAEDIC SURGERY

## 2019-11-14 PROCEDURE — 99999 PR PBB SHADOW E&M-EST. PATIENT-LVL III: CPT | Mod: PBBFAC,,, | Performed by: ORTHOPAEDIC SURGERY

## 2019-11-14 PROCEDURE — 99214 PR OFFICE/OUTPT VISIT, EST, LEVL IV, 30-39 MIN: ICD-10-PCS | Mod: S$PBB,,, | Performed by: ORTHOPAEDIC SURGERY

## 2019-11-14 NOTE — PROGRESS NOTES
CC: RIGHT shoulder pain     86 y.o. Female returns to clinic for follow up evaluation and to discuss MRI/CT results.  Reports shoulder pain has improved with physical therapy.    History of right shoulder pain for over 15 years (had a fall/FOOSH).  Recently completed physical therapy; reports improvement in pain.  No improvement in pain. Currently taking Voltaren tablets.    She reports that the pain and weakness is worse with overhead activity. It also bothers her at night.    Is affecting ADLs.  Pain is 3/10 at it's worst.      Past Medical History:   Diagnosis Date    Arthritis     Basal cell carcinoma 09/2016    right post auricular neck     Breast cancer 1992    right    Cataract     Fibromyalgia     History of measles as a child     Hyperlipidemia     Hypertension     Personal history of colonic polyps     Pneumonia     SCC (squamous cell carcinoma) 2015    R chest    SCC (squamous cell carcinoma) 2016    left medial shoulder    SCC (squamous cell carcinoma) 2017    left knee    Shingles     Squamous cell carcinoma 2015    right forearm    Thyroid disease     Vaginitis        Past Surgical History:   Procedure Laterality Date    ADENOIDECTOMY      BREAST BIOPSY Left     Excisional bx, benign    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    CATARACT EXTRACTION W/  INTRAOCULAR LENS IMPLANT Bilateral     EYE SURGERY      JOINT REPLACEMENT      thyriodectomy      partial    tonsillectomy      TONSILLECTOMY      TOTAL HIP ARTHROPLASTY      right    Yag  Left        Family History   Problem Relation Age of Onset    Breast cancer Mother     Cancer Mother     Stroke Paternal Grandfather     Heart disease Father     Cancer Paternal Aunt     Heart disease Paternal Aunt     Glaucoma Paternal Grandmother     Amblyopia Neg Hx     Blindness Neg Hx     Cataracts Neg Hx     Diabetes Neg Hx     Hypertension Neg Hx     Macular degeneration Neg Hx     Retinal detachment Neg Hx     Strabismus  Neg Hx     Thyroid disease Neg Hx     Melanoma Neg Hx          Current Outpatient Medications:     acetaminophen (TYLENOL) 650 MG TbSR, Take 650 mg by mouth as needed (pain). , Disp: , Rfl:     alendronate (FOSAMAX) 35 MG tablet, Take 1 tablet (35 mg total) by mouth every 7 days., Disp: 4 tablet, Rfl: 11    B INFANTIS/B ANI/B JOSE/B BIFID (PROBIOTIC 4X ORAL), Take 1 tablet by mouth daily, Disp: , Rfl:     coenzyme Q10 (CO Q-10) 100 mg capsule, Take 100 mg by mouth once daily., Disp: , Rfl:     diclofenac sodium (VOLTAREN) 1 % Gel, Apply topically 2 (two) times daily., Disp: 1 Tube, Rfl: 6    glucosamine-chondroitin 500-400 mg tablet, Take 1 tablet by mouth once daily. , Disp: , Rfl:     hydroCHLOROthiazide (HYDRODIURIL) 12.5 MG Tab, Take 1 tablet (12.5 mg total) by mouth once daily., Disp: 30 tablet, Rfl: 11    ipratropium (ATROVENT) 0.03 % nasal spray, 2 sprays by Nasal route 2 (two) times daily., Disp: , Rfl:     labetalol (NORMODYNE) 100 MG tablet, TAKE 1 AND 1/2 TABLETS BY MOUTH TWICE A DAY, Disp: 90 tablet, Rfl: 2    LORazepam (ATIVAN) 0.5 MG tablet, Take 0.5 tablets (0.25 mg total) by mouth 2 (two) times daily. Prn severe anxiety, Disp: 30 tablet, Rfl: 0    multivitamin capsule, Take 1 capsule by mouth once daily., Disp: , Rfl:     NIFEdipine (PROCARDIA-XL) 60 MG (OSM) 24 hr tablet, Take 1 tablet (60 mg total) by mouth once daily., Disp: 30 tablet, Rfl: 11    omeprazole (PRILOSEC OTC) 20 MG tablet, Take 20 mg by mouth once daily.  , Disp: , Rfl:     pravastatin (PRAVACHOL) 40 MG tablet, TAKE 1 TABLET (40 MG TOTAL) BY MOUTH ONCE DAILY., Disp: 90 tablet, Rfl: 3    TEKTURNA 300 mg Tab, TAKE 1 TABLET (300 MG TOTAL) BY MOUTH ONCE DAILY., Disp: 90 tablet, Rfl: 2    TURMERIC ORAL, Take 500 mg by mouth 2 (two) times daily. , Disp: , Rfl:     spironolactone (ALDACTONE) 50 MG tablet, Take 1.5 tablets (75 mg total) by mouth once daily for blood pressure. (Patient not taking: Reported on 11/14/2019),  "Disp: 45 tablet, Rfl: 3    Review of patient's allergies indicates:   Allergen Reactions    Sulfa (sulfonamide antibiotics) Rash    Clarithromycin Other (See Comments)     Weak, extreme fatigue. dizziness    Flexeril [cyclobenzaprine] Other (See Comments)     Dizziness    Iodine and iodide containing products     Lisinopril Other (See Comments)     Cough and sensation of throat swelling/?angioedema    Losartan Rash    Metoprolol Swelling     Tightness in throat    Tramadol Other (See Comments)     Dizzy and weak    Verapamil (bulk) Palpitations    Voltaren [diclofenac sodium] Other (See Comments)     Drops blood pressure          REVIEW OF SYSTEMS:  Constitution: Negative. Negative for chills, fever and night sweats.   HENT: Negative for congestion and headaches.    Eyes: Negative for blurred vision, left vision loss and right vision loss.   Cardiovascular: Negative for chest pain and syncope.   Respiratory: Negative for cough and shortness of breath.    Endocrine: Negative for polydipsia, polyphagia and polyuria.   Hematologic/Lymphatic: Negative for bleeding problem. Does not bruise/bleed easily.   Skin: Negative for dry skin, itching and rash.   Musculoskeletal: Negative for falls.  Positive for right shoulder pain and muscle weakness.   Gastrointestinal: Negative for abdominal pain and bowel incontinence.   Genitourinary: Negative for bladder incontinence and nocturia.   Neurological: Negative for disturbances in coordination, loss of balance and seizures.   Psychiatric/Behavioral: Negative for depression. The patient does not have insomnia.    Allergic/Immunologic: Negative for hives and persistent infections.      PHYSICAL EXAMINATION:  Vitals:  BP (!) 120/58   Pulse 67   Ht 5' 3" (1.6 m)   Wt 64 kg (141 lb)   BMI 24.98 kg/m²    General: The patient is alert and oriented x 3.  Mood is pleasant.  Observation of ears, eyes and nose reveal no gross abnormalities.  No labored breathing " observed.  Gait is coordinated. Patient can toe walk and heel walk without difficulty.      RIGHT SHOULDER / UPPER EXTREMITY EXAM    OBSERVATION:     Swelling  none  Deformity  none   Discoloration  none   Scapular winging none   Scars   none  Atrophy  none    TENDERNESS / CREPITUS (T/C):          T/C      T/C   Clavicle   -/-  SUPRAspinatus    -/-     AC Jt.    +/-  INFRAspinatus  -/-    SC Jt.    -/-  Deltoid    -/-      G. Tuberosity  -/-  LH BICEP groove  -/-   Acromion:  -/-  Midline Neck   -/-     Scapular Spine -/-  Trapezium   -/-   SMA Scapula  -/-  GH jt. line - post  -/-     Scapulothoracic  -/-         ROM: (* = with pain)  Left shoulder   Right shoulder        AROM (PROM)   AROM (PROM)   FE    170° (175°)     30° (150°) *     ER at 90° ABD  90°  (90°)    45°  (50°) *   IR (spine level)   T10     0    STRENGTH: (* = with pain) Left shoulder   Right shoulder   SCAPTION   5/5    3+/5    IR    5/5    4/5   ER    5/5    4/5   BICEPS   5/5    4/5   Deltoid    5/5    4+/5     SIGNS:  Painful side       NEER   +    OSHAHZADS  neg    SELF   +    SPEEDS  neg     DROP ARM   -   BELLY PRESS neg   Superior escape none    LIFT-OFF  neg   X-Body ADD    neg    MOVING VALGUS neg        STABILITY TESTING    Left shoulder   Right shoulder    Translation     Anterior  up face     up face    Posterior  up face    up face    Sulcus   < 10mm    < 10 mm     Signs   Apprehension   neg      neg       Relocation   no change     no change      Jerk test  neg     neg    EXTREMITY NEURO-VASCULAR EXAM:    Sensation grossly intact to light touch all dermatomal regions.    DTR 2+ Biceps, Triceps, BR and Negative Rashids sign   Grossly intact motor function at Elbow, Wrist and Hand   Distal pulses radial and ulnar 2+, brisk cap refill, symmetric.      NECK:  Painless FROM and spinous processes non-tender. Negative Spurlings sign.      OTHER FINDINGS:  + scapular dyskinesia    XRAYS (10/31/19): There is baseline DJD.  No  fracture, dislocation or bone destruction seen.      ASSESSMENT:   Right shoulder pain, DJD, RC tear    PLAN:  1. Continue physical therapy  2. Will call in future if interested in right shoulder reverse total shoulder replacement.  Will need PCP clearance.  3. Follow up as needed

## 2019-11-18 ENCOUNTER — PATIENT OUTREACH (OUTPATIENT)
Dept: OTHER | Facility: OTHER | Age: 84
End: 2019-11-18

## 2019-11-18 ENCOUNTER — OFFICE VISIT (OUTPATIENT)
Dept: RHEUMATOLOGY | Facility: CLINIC | Age: 84
End: 2019-11-18
Payer: MEDICARE

## 2019-11-18 VITALS
DIASTOLIC BLOOD PRESSURE: 55 MMHG | BODY MASS INDEX: 24.41 KG/M2 | WEIGHT: 143 LBS | HEART RATE: 62 BPM | HEIGHT: 64 IN | SYSTOLIC BLOOD PRESSURE: 128 MMHG

## 2019-11-18 DIAGNOSIS — I10 HYPERTENSION, ESSENTIAL: Primary | ICD-10-CM

## 2019-11-18 DIAGNOSIS — M85.80 OSTEOPENIA, UNSPECIFIED LOCATION: ICD-10-CM

## 2019-11-18 DIAGNOSIS — M17.0 PRIMARY OSTEOARTHRITIS OF BOTH KNEES: ICD-10-CM

## 2019-11-18 DIAGNOSIS — M12.811 ROTATOR CUFF TEAR ARTHROPATHY OF BOTH SHOULDERS: Primary | ICD-10-CM

## 2019-11-18 DIAGNOSIS — M75.102 ROTATOR CUFF TEAR ARTHROPATHY OF BOTH SHOULDERS: Primary | ICD-10-CM

## 2019-11-18 DIAGNOSIS — M75.101 ROTATOR CUFF TEAR ARTHROPATHY OF BOTH SHOULDERS: Primary | ICD-10-CM

## 2019-11-18 DIAGNOSIS — I10 HYPERTENSION, ESSENTIAL: ICD-10-CM

## 2019-11-18 DIAGNOSIS — M75.101 RIGHT ROTATOR CUFF TEAR ARTHROPATHY: ICD-10-CM

## 2019-11-18 DIAGNOSIS — M12.811 RIGHT ROTATOR CUFF TEAR ARTHROPATHY: ICD-10-CM

## 2019-11-18 DIAGNOSIS — M12.812 ROTATOR CUFF TEAR ARTHROPATHY OF BOTH SHOULDERS: Primary | ICD-10-CM

## 2019-11-18 PROCEDURE — 99213 PR OFFICE/OUTPT VISIT, EST, LEVL III, 20-29 MIN: ICD-10-PCS | Mod: S$PBB,,, | Performed by: INTERNAL MEDICINE

## 2019-11-18 PROCEDURE — 99999 PR PBB SHADOW E&M-EST. PATIENT-LVL IV: CPT | Mod: PBBFAC,,, | Performed by: INTERNAL MEDICINE

## 2019-11-18 PROCEDURE — 99214 OFFICE O/P EST MOD 30 MIN: CPT | Mod: PBBFAC | Performed by: INTERNAL MEDICINE

## 2019-11-18 PROCEDURE — 99999 PR PBB SHADOW E&M-EST. PATIENT-LVL IV: ICD-10-PCS | Mod: PBBFAC,,, | Performed by: INTERNAL MEDICINE

## 2019-11-18 PROCEDURE — 99213 OFFICE O/P EST LOW 20 MIN: CPT | Mod: S$PBB,,, | Performed by: INTERNAL MEDICINE

## 2019-11-18 RX ORDER — FELODIPINE 2.5 MG/1
2.5 TABLET, EXTENDED RELEASE ORAL DAILY
Qty: 30 TABLET | Refills: 5
Start: 2019-11-18 | End: 2019-11-25

## 2019-11-18 ASSESSMENT — ROUTINE ASSESSMENT OF PATIENT INDEX DATA (RAPID3)
PATIENT GLOBAL ASSESSMENT SCORE: 4
TOTAL RAPID3 SCORE: 2
PAIN SCORE: 0
FATIGUE SCORE: 0
AM STIFFNESS SCORE: 0, NO
MDHAQ FUNCTION SCORE: .6
PSYCHOLOGICAL DISTRESS SCORE: 2.2

## 2019-11-18 NOTE — PROGRESS NOTES
"Subjective:       Patient ID: Gerda Lai is a 86 y.o. female.    Chief Complaint: Gen OA; louis knees(lateral TF right>left), bilateral rotator cuff tear arthropathies(right>left); s/p right THR, mild OA left hip  HPI Just saw Dr. Garces in Ortho 11/14/19  for right shoulder recommended continuing PT and call in future if interested in reverse total shoulder replacement after PCP clearance. To start PT shortly. Using diclofenac gel up to 3 times daily. Not doing [facility] as she did in past.   Review of Systems      Objective:   BP (!) 128/55   Pulse 62   Ht 5' 3.6" (1.615 m)   Wt 64.9 kg (143 lb)   BMI 24.86 kg/m²      Physical Exam       Right Side Rheumatological Exam     The patient is tender to palpation of the shoulder    The patient has an enlarged shoulder, knee, 1st PIP, 2nd PIP, 3rd PIP, 4th PIP, 5th PIP, 2nd DIP, 3rd DIP, 4th DIP and 5th DIP    Left Side Rheumatological Exam     The patient has an enlarged shoulder, knee, 1st PIP, 2nd PIP, 3rd PIP, 4th PIP, 5th PIP, 2nd DIP, 3rd DIP, 4th DIP and 5th DIP.            Assessment/Plan         Rotator cuff tear arthropathy of both shoulders  -     Ambulatory Referral to Physical/Occupational Therapy    Right rotator cuff tear arthropathy  -     Ambulatory Referral to Physical/Occupational Therapy    Primary osteoarthritis of both knees  -     Ambulatory Referral to Physical/Occupational Therapy    Osteopenia, unspecified location    Hypertension, essential       Problem List Items Addressed This Visit     Right rotator cuff tear arthropathy    Overview     MRI right shoulder:    Massive rotator cuff tear involving full-thickness tear supraspinatus and infraspinatus with retraction level of the glenoid, superior migration of the humeral head, diffuse muscular atrophy with secondary degenerative changes glenohumeral joint as above.      Electronically signed by: Ebenezer Martinez MD  Date: 11/11/2019  Time: 14:39    CT right " shoulder:    Impression       No acute abnormality.    Severe degenerative changes of the right shoulder.    Findings compatible with known rotator cuff pathology.    Additional, stable findings as above.    Electronically signed by resident: Andi Sharif  Date: 11/11/2019  Time: 13:51              Current Assessment & Plan     PT reordered  Diclofenac gel 1% up to 4 x daily prn           Relevant Orders    Ambulatory Referral to Physical/Occupational Therapy    Osteopenia    Overview     Aretha Snell MD 2/7/2019       Narrative     EXAMINATION:  DEXA BONE DENSITY SPINE HIP    CLINICAL HISTORY:  suspected osteoporosis; Asymptomatic menopausal state85 y/o female with no history of fractures.  She had a hysterectomy at 41 y/o.  She is taking 400 units of Vit D supplements.  She has a history of Breast Ca.  Pt had a right hip replacement.  She exercises daily and does not smoke.    TECHNIQUE:  DXA specification: [employer] [id number].    Bone Mineral Density scanning was performed over the hip and lumbar spine. Review of the images confirms satisfactory positioning and technique.    COMPARISON:  Comparison study done on 04/21/2014. Lumbar spine BMD 1.478 g/cm2 and the T-score 3.9.  The Total Hip BMD 0.882 g/cm2 and the T-score -0.5.    FINDINGS:  Lumbar Spine: Lumbar bone mineral density L1-L4 is 1.502g/cm2, which is a T-score of 4.1. The Z-score is 7.0.    Total Hip: Total hip bone mineral density is 0.883g/cm2.  The T-score is -0.5, and the Z-score is 1.9.    Femoral neck: Bone mineral density is 0.711g/cm2 and the T-score is -1.2 and the Z-score is 1.3 g/cm2.    There is a 12% risk of a major osteoporotic fracture and a 3.1% risk of hip fracture in the next 10 years (FRAX).    Compared with previous DXA, BMD at the lumbar spine has increased by 1.6%, and the BMD at the total hip has remained stable.      Impression       Osteopenia of the femoral neck.  FRAX calculations do not suggest  treatment.    RECOMMENDATIONS of [facility]:    1.  Calcium 4138-8964 mg daily and vitamin D 800-1000 units daily, adequate exercise.    2.  Consider x-ray of lumbar spine to evaluate very elevated Z-score of 7 and to rule out fracture at L2 which is more dense than the other vertebral bodies    3.  Repeat BMD in 2 years     Results for JOSÉ MIGUEL CASTILLO (MRN [id number]) as of 5/20/2019 10:59   Ref. Range 2/11/2019 16:47   TSH Latest Ref Range: 0.400 - 4.000 uIU/mL 0.940   Results for JOSÉ MIGUEL CASTILLO (MRN [id number]) as of 5/20/2019 10:59   Ref. Range 2/11/2019 16:47   PTH Latest Ref Range: 9.0 - 77.0 pg/mL 46.0   Results for JOSÉ MIGUEL CASTILLO (MRN [id number]) as of 5/20/2019 10:59   Ref. Range 2/11/2019 16:47   Vit D, 25-Hydroxy Latest Ref Range: 30 - 96 ng/mL 30   Results for JOSÉ MIGUEL CASTILLO (MRN [id number]) as of 5/20/2019 10:59   Ref. Range 4/4/2019 10:10   Magnesium Latest Ref Range: 1.6 - 2.6 mg/dL 1.6   Results for JOSÉ MIGUEL CASTILLO (MRN [id number]) as of 5/20/2019 10:59   Ref. Range 2/11/2019 16:47   Phosphorus Latest Ref Range: 2.7 - 4.5 mg/dL 4.3      3mo ago  (2/13/19) 3mo ago  (2/13/19)    Urine Volume mL 650  650    Urine Collection Duration Hr 24  24    Calcium, Urine 0.0 - 15.0 mg/dL 2.3     Calcium, 24H Urine 4 - 12 mg/Hr 1Low      CA Urine (mg/Spec) mg/Spec 15     Resulting Agency  [facility]      Pathologist Interpretation HEIDI   Pathologist Interpretation HEIDI   Collected: 02/11/19 1647   Resulting lab: [facility]   Value: REVIEWED   Comment: Electronically reviewed and signed by:   Susi Tijerina MD   Signed on 02/12/19 at 14:07   No monoclonal peaks identified.      02/13/19 1058    Resulting lab: [facility]   Value: REVIEWED   Comment: Electronically reviewed and signed by:   Susi Tijerina MD   Signed on 02/14/19 at 16:10   No monoclonal peaks identified.               Current Assessment & Plan     Cont alendronate 35mg  po once a week         Hypertension, essential    Overview     Note that she has an auscultatory gap         Current Assessment & Plan     C/o dizziness, LE edema  Disc with  Area possibly decreasing nifedipine         Osteoarthritis of knees, bilateral    Overview     Moderate to severe DJD with some progression of joint space narrowing at the lateral compartment of the tibiofemoral joint space on the right.  Previously described left suprapatellar effusion is no longer present.  In addition, the previously described calcification above the left patella is also no longer seen.         Current Assessment & Plan     Turmeric 500-1000mg daily   PT  Fall prevention           Relevant Orders    Ambulatory Referral to Physical/Occupational Therapy      Other Visit Diagnoses     Rotator cuff tear arthropathy of both shoulders    -  Primary    Relevant Orders    Ambulatory Referral to Physical/Occupational Therapy

## 2019-11-18 NOTE — PATIENT INSTRUCTIONS
Turmeric 500mg twice daily with Boswellia.   Use diclofenac gel up to four times daily  Consider  Resume [facility]  PT reordered

## 2019-11-18 NOTE — PROGRESS NOTES
Digital Medicine: Clinician Follow-Up    Patient returned missed phone call for hypertension follow-up. She complains of leg/ankle swelling and muscle cramps in her legs. She reports lower readings last week and today in appointments of /55 and 120/55. She complains of dizziness with these readings. Due to ADRs and concerns of nifedipine, she desires to stop this medication. She reports receiving felodipine 2.5 mg from pharmacy when it was initially prescribed after her insurance approved its use.     The history is provided by the patient. No  was used.     Follow Up  Follow-up reason(s): reading review      Readings are trending up Patient was started on nifedipine 60 mg on 10/17/19 by cardiology Nikole Stubbs NP.      INTERVENTION(S)  recommended med change and encouragement/support    PLAN  patient verbalizes understanding, patient amenable to changes and continue monitoring    Current 30-day BP average of 150/70 is close to goal of <150/90.  Reviewed readings: DBP consistently controlled, meeting goal. SBP intermittently controlled but trending upwards.   Discussed patient's concerns with current use of nifedipine 60 mg for BP management. Could consider lower dose but patient has felodipine 2.5 mg in her possession which was initially prescribed in October but not covered by her insurance. She managed to get insurance to cover and pharmacy dispensed before Rx was cancelled and replaced with nifedipine.    Stop nifedipine 60 mg.  Switch CCB to felodipine 2.5 mg once daily in the evening.   Continue current regimen of Tekturna and labetalol. Continue holding diuretics due to complaint of hypotension.   Can consider lower dose of nifedipine (10 or 20 mg) in the future if needed.    Follow-up in 2 weeks.       There are no preventive care reminders to display for this patient.    Last 5 Patient Entered Readings                                      Current 30 Day Average: 150/70     Recent  Readings 11/15/2019 11/15/2019 11/15/2019 11/15/2019 11/8/2019    SBP (mmHg) 150 150 156 156 172    DBP (mmHg) 68 68 71 71 73    Pulse 68 68 70 70 61             Hypertension Medications             hydroCHLOROthiazide (HYDRODIURIL) 12.5 MG Tab Take 1 tablet (12.5 mg total) by mouth once daily. - HOLD DUE TO COMPLAINTS OF HYPOTENSION    labetalol (NORMODYNE) 100 MG tablet TAKE 1 AND 1/2 TABLETS BY MOUTH TWICE A DAY    felodipine (PLENDIL) 2.5 MG Tb24 Take 1 tablet (2.5 mg total) by mouth once daily.    spironolactone (ALDACTONE) 50 MG tablet Take 1.5 tablets (75 mg total) by mouth once daily for blood pressure. - HOLDING DUE TO COMPLAINT OF HYPOTENSION    TEKTURNA 300 mg Tab TAKE 1 TABLET (300 MG TOTAL) BY MOUTH ONCE DAILY.             Screenings

## 2019-11-25 ENCOUNTER — OFFICE VISIT (OUTPATIENT)
Dept: PRIMARY CARE CLINIC | Facility: CLINIC | Age: 84
End: 2019-11-25
Payer: MEDICARE

## 2019-11-25 ENCOUNTER — PATIENT OUTREACH (OUTPATIENT)
Dept: OTHER | Facility: OTHER | Age: 84
End: 2019-11-25

## 2019-11-25 ENCOUNTER — PATIENT MESSAGE (OUTPATIENT)
Dept: PRIMARY CARE CLINIC | Facility: CLINIC | Age: 84
End: 2019-11-25

## 2019-11-25 ENCOUNTER — TELEPHONE (OUTPATIENT)
Dept: PRIMARY CARE CLINIC | Facility: CLINIC | Age: 84
End: 2019-11-25

## 2019-11-25 ENCOUNTER — LAB VISIT (OUTPATIENT)
Dept: LAB | Facility: HOSPITAL | Age: 84
End: 2019-11-25
Attending: FAMILY MEDICINE
Payer: MEDICARE

## 2019-11-25 VITALS
TEMPERATURE: 98 F | SYSTOLIC BLOOD PRESSURE: 160 MMHG | DIASTOLIC BLOOD PRESSURE: 58 MMHG | HEART RATE: 64 BPM | HEIGHT: 64 IN | WEIGHT: 143.06 LBS | BODY MASS INDEX: 24.42 KG/M2

## 2019-11-25 DIAGNOSIS — I10 HYPERTENSION, UNSPECIFIED TYPE: ICD-10-CM

## 2019-11-25 DIAGNOSIS — I10 ESSENTIAL HYPERTENSION: ICD-10-CM

## 2019-11-25 DIAGNOSIS — I10 HYPERTENSION, ESSENTIAL: Primary | ICD-10-CM

## 2019-11-25 PROCEDURE — 99999 PR PBB SHADOW E&M-EST. PATIENT-LVL III: ICD-10-PCS | Mod: PBBFAC,,, | Performed by: FAMILY MEDICINE

## 2019-11-25 PROCEDURE — 99213 OFFICE O/P EST LOW 20 MIN: CPT | Mod: PBBFAC,PN | Performed by: FAMILY MEDICINE

## 2019-11-25 PROCEDURE — 82533 TOTAL CORTISOL: CPT

## 2019-11-25 PROCEDURE — 82088 ASSAY OF ALDOSTERONE: CPT

## 2019-11-25 PROCEDURE — 99999 PR PBB SHADOW E&M-EST. PATIENT-LVL III: CPT | Mod: PBBFAC,,, | Performed by: FAMILY MEDICINE

## 2019-11-25 PROCEDURE — 1159F MED LIST DOCD IN RCRD: CPT | Mod: ,,, | Performed by: FAMILY MEDICINE

## 2019-11-25 PROCEDURE — 1159F PR MEDICATION LIST DOCUMENTED IN MEDICAL RECORD: ICD-10-PCS | Mod: ,,, | Performed by: FAMILY MEDICINE

## 2019-11-25 PROCEDURE — 99214 OFFICE O/P EST MOD 30 MIN: CPT | Mod: S$PBB,,, | Performed by: FAMILY MEDICINE

## 2019-11-25 PROCEDURE — 99214 PR OFFICE/OUTPT VISIT, EST, LEVL IV, 30-39 MIN: ICD-10-PCS | Mod: S$PBB,,, | Performed by: FAMILY MEDICINE

## 2019-11-25 RX ORDER — LABETALOL 100 MG/1
200 TABLET, FILM COATED ORAL EVERY 12 HOURS
Qty: 180 TABLET | Refills: 3 | Status: SHIPPED | OUTPATIENT
Start: 2019-11-25 | End: 2019-12-23 | Stop reason: DRUGHIGH

## 2019-11-25 RX ORDER — NIFEDIPINE 90 MG/1
90 TABLET, EXTENDED RELEASE ORAL DAILY
COMMUNITY
Start: 2019-10-10 | End: 2019-11-25

## 2019-11-25 NOTE — PROGRESS NOTES
Digital Medicine: Clinician Follow-Up    Gerda Lai submitted a reading of 188/86 at 11/23/2019  2:14 PM. Called patient for hypertension follow-up. She denies s/sx of hypertension with elevated reading this weekend - no CP, SOB, headaches or blurred vision. She started felodipine 2.5 mg daily for blood pressure management on 11/19/19 in place of nifedipine. Today, she is reporting pains in ankle, feet, calf and thighs with felodipine 2.5 mg that is intolerable. She continues to report feeling terrible when BP is in lower range of <130/80.     The history is provided by the patient. No  was used.     Follow Up  Follow-up reason(s): reading review and medication change follow-up      Alert received.   Care Team received high BP alert.  Patient is not experiencing symptoms.  Medication Change: new med    Patient started new medication.    Date:  11/19/2019  Is patient tolerating med change?:  No  Reasons:  Patient complaint of the following ADRs: pain in thigh/calf/leg, top of feet, ankles that is intolerableHistory of medication changes:  Note: Patient is not currently taking HCTZ and spironolactone. Medications on hold due to complaints of hypotension after starting nifedipine 60 mg. Holding since 10/21/19.  Note: Patient is not taking labetalol as prescribed. Currently taking 100 mg in the morning and 50 mg in the evening.   - History of amlodipine use. Amlodipine was discontinued due to complaints of leg/ankle/feet swelling.  - 8/15/19: Nifedipine 90 mg stopped due to complaint of leg/ankle swelling  - 10/18/19: Raised BP goal to 150/90 per cardiology Heytens recommendation. Cardiology initiated HCTZ 12.5 mg and nifedipine 60 mg.  - 10/21/19: Complaint of s/sx of hypotension - instructed to HOLD HCTZ and spironolactone.  - 11/18/19: Nifedipine 60 mg stopped due to complaints of ADRs: dizziness with controlled readings, constipation and swelling in legs/ankle/feet that was intolerable.  Felodipine 2.5 mg once daily initiated.        INTERVENTION(S)  recommended med change, recommended med change - patient refuses and encouragement/support    PLAN  patient verbalizes understanding, await MD intervention and continue monitoring    Current 30-day BP average of 162/76 is uncontrolled, does not meet goal of <150/90.  Reviewed readings: trending upwards, DBP consistently controlled. SBP occasionally controlled. BP readings meeting goal 36% of the time.     Ms. Lorenz is a very pleasant woman but continues to be a very difficult patient to treat for BP due to complaints of ADRs to medications. She continues to complain about adverse side effects of every medication prescribed that works well at controlling her blood pressure which makes continuing their use difficult. She refused increasing felodipine from 2.5 mg to 5 mg today due to complaints of pains in her legs/ankles/feet/thigh/calf. Nifedipine worked well at controlling her BP but was discontinued due to her complaints of side affects (constipation, swelling). I question if Tekturna is doing anything for her BP management but she does not have any complaints of Tekturna's use. Diuretics are currently on hold due to complaints of s/sx of hypotension and patient's poor fluid intake which leads to dehydration.   Most adverse effects of CCBs are dose dependent, so a decrease in dose is a possible way to improve her tolerability. Also, patients who do not tolerate dihydropyridines may tolerate a change to non-dihydropyridines; however, verapamil is listed as an allergy due to causing palpitations. In reviewing her chart, she has also tolerated IV nicardipine 40 mg/200 mL infusion for uncontrolled hypertension inpatient. Diuretics have been held since 10/21/19 with no negative impact to her blood pressure as long as CCB is on board. It appears that with CCB on board there is no need for diuretic to manage her blood pressure.     Patient has appointment  with PCP Area today at 420p due to elevated readings over the weekend. Will discuss path forward for her care and await MD intervention.   My recommendations are the following:  - Discontinue HCTZ and spironolactone as patient is not taking and leads to hypotension due to poor fluid intake. (I held them on her profile in case we needed to come back to them for use.)  - Discontinue felodipine 2.5 mg due to current complaints.  - Increase labetalol to 200 mg BID.  - Consider oral nifedipine 10 mg or nicardipine 20 mg every 8 hours  - Refer to Psychiatry to address underlying issues that may be leading to patient experiencing side effects which makes treatment difficult. She rejected my previous referral to Psychiatry.      There are no preventive care reminders to display for this patient.    Last 5 Patient Entered Readings                                      Current 30 Day Average: 162/76     Recent Readings 11/23/2019 11/23/2019 11/23/2019 11/23/2019 11/15/2019    SBP (mmHg) 206 206 188 188 150    DBP (mmHg) 87 87 86 86 68    Pulse 59 59 53 53 68             Hypertension Medications             felodipine (PLENDIL) 2.5 MG Tb24 Take 1 tablet (2.5 mg total) by mouth once daily.    hydroCHLOROthiazide (HYDRODIURIL) 12.5 MG Tab Take 1 tablet (12.5 mg total) by mouth once daily. ON HOLD SINCE 10/21/19.    labetalol (NORMODYNE) 100 MG tablet TAKE 1 AND 1/2 TABLETS BY MOUTH TWICE A DAY  Patient taking differently: taking 100 mg in the morning and 50 mg in the evening.    spironolactone (ALDACTONE) 50 MG tablet Take 1.5 tablets (75 mg total) by mouth once daily for blood pressure. ON HOLD SINCE 10/21/19.    TEKTURNA 300 mg Tab TAKE 1 TABLET (300 MG TOTAL) BY MOUTH ONCE DAILY.                 Screenings

## 2019-11-25 NOTE — PROGRESS NOTES
Subjective:       Patient ID: Gerda Lai is a 86 y.o. female.    Chief Complaint: Hypertension    87 yo presents today for follow up of poorly controlled hypertension.  She is presently off HCTZ and Spironolactone, which has caused her postural hypotension in the past.  She is also refusing to take calcium channel blockers since she thinks it is causing diffuse myalgias and arthralgias.  She tolerates Tekturna and Labetalol.  She wants to stop Felodipine  Pt denies any significant sleep problems, not sure whether she snores or has episodes of sleep apnea.  Denies increased fatigue during the day    Review of Systems   Constitutional: Negative for activity change, fatigue and unexpected weight change.   Respiratory: Negative for chest tightness and shortness of breath.    Cardiovascular: Negative for chest pain, palpitations and leg swelling.   Musculoskeletal: Positive for arthralgias and myalgias.   Neurological: Negative for dizziness, weakness and light-headedness.       Objective:      Physical Exam   Constitutional: She appears well-developed and well-nourished. No distress.   Neck: Normal range of motion. No JVD present. No thyromegaly present.   Cardiovascular: Normal rate and regular rhythm.   No murmur heard.  Pulmonary/Chest: Effort normal and breath sounds normal. She has no wheezes. She has no rales.   Abdominal: Soft. Bowel sounds are normal.   Musculoskeletal: She exhibits no edema.   Lymphadenopathy:     She has no cervical adenopathy.       Assessment:         Hypertension, poor control  Plan:       1.  Stop Felodipine, HCTZ, Spironolactone  2.  Increase Labetolol to 200mg bid  3.  Continue Tekturna  4.  Renal US  5.  Renin/aldosterone, random Cortisol  6.  Consider sleep study

## 2019-11-25 NOTE — TELEPHONE ENCOUNTER
Voicemail left for patient that Dr. Sellers wanted to see her today in regards to her elevated blood pressure; I have scheduled her for 4:20pm today, please call the clinic back to confirm appointment.

## 2019-11-26 LAB — CORTIS SERPL-MCNC: 9.6 UG/DL

## 2019-11-30 DIAGNOSIS — I10 HYPERTENSION, ESSENTIAL: ICD-10-CM

## 2019-12-01 LAB
ALDOST SERPL-MCNC: 9.1 NG/DL
ALDOST/RENIN PLAS-RTO: 45.5 RATIO
RENIN PLAS-CCNC: 0.2 NG/ML/HR

## 2019-12-02 ENCOUNTER — PATIENT MESSAGE (OUTPATIENT)
Dept: PRIMARY CARE CLINIC | Facility: CLINIC | Age: 84
End: 2019-12-02

## 2019-12-02 ENCOUNTER — PATIENT OUTREACH (OUTPATIENT)
Dept: OTHER | Facility: OTHER | Age: 84
End: 2019-12-02

## 2019-12-02 ENCOUNTER — TELEPHONE (OUTPATIENT)
Dept: PRIMARY CARE CLINIC | Facility: CLINIC | Age: 84
End: 2019-12-02

## 2019-12-02 ENCOUNTER — HOSPITAL ENCOUNTER (OUTPATIENT)
Dept: RADIOLOGY | Facility: HOSPITAL | Age: 84
Discharge: HOME | End: 2019-12-02
Attending: FAMILY MEDICINE
Payer: MEDICARE

## 2019-12-02 DIAGNOSIS — R79.89 ABNORMAL ALDOSTERONE TO RENIN RATIO: Primary | ICD-10-CM

## 2019-12-02 DIAGNOSIS — I10 ESSENTIAL HYPERTENSION: ICD-10-CM

## 2019-12-02 PROCEDURE — 93975 VASCULAR STUDY: CPT | Mod: TC

## 2019-12-02 PROCEDURE — 93975 VASCULAR STUDY: CPT | Mod: 26,,, | Performed by: INTERNAL MEDICINE

## 2019-12-02 PROCEDURE — 93975 US RENAL ARTERY STENOSIS HYPERTEN (XPD): ICD-10-PCS | Mod: 26,,, | Performed by: INTERNAL MEDICINE

## 2019-12-02 RX ORDER — LORAZEPAM 0.5 MG/1
TABLET ORAL
Qty: 30 TABLET | Refills: 0 | Status: SHIPPED | OUTPATIENT
Start: 2019-12-02 | End: 2019-12-06 | Stop reason: SDUPTHER

## 2019-12-02 NOTE — TELEPHONE ENCOUNTER
----- Message from Earnestine San sent at 12/2/2019  2:13 PM CST -----  Contact: Patient [phone]  Patient stated that she missed a call from you.    Please call and advise.    Thank You

## 2019-12-02 NOTE — PROGRESS NOTES
Digital Medicine: Clinician Follow-Up    Patient returned missed phone call for hypertension follow-up. Ms. Lorenz went to [place] for the Thanksgiving weekend and did not take her blood pressure device with her to measure her blood pressure. She reports that she will measure blood pressure later today. She expressed concerns about lab results for tests requested by Dr. Sellers last week and some concern that she was transferred to an area to schedule surgery as opposed to an appointment with Endocrinology as she had requested. She reports that she increased labetalol dose on Monday 11/25 as instructed by Dr. Sellers.    The history is provided by the patient. No  was used.     Follow Up  Follow-up reason(s): reading review and medication change follow-up      Readings are missing.   Patient was traveling and elected not to take her blood pressure monitor.  Medication Change: dose increase and stop therapy        INTERVENTION(S)  encouragement/support    PLAN  continue monitoring    Current 30-day BP average of 165/74 is uncontrolled, does not meet goal of <150/90.  Reviewed readings: only one reading submitted since medication changes made on 11/25/19.  Requested patient measure and submit BP readings for monitoring.     Continue current regimen.    Follow-up in 2 weeks.      There are no preventive care reminders to display for this patient.    Last 5 Patient Entered Readings                                      Current 30 Day Average: 165/74     Recent Readings 11/28/2019 11/28/2019 11/25/2019 11/25/2019 11/23/2019    SBP (mmHg) 166 166 137 137 206    DBP (mmHg) 65 65 60 60 87    Pulse 55 55 58 58 59             Hypertension Medications             labetalol (NORMODYNE) 100 MG tablet Take 2 tablets (200 mg total) by mouth every 12 (twelve) hours.    TEKTURNA 300 mg Tab TAKE 1 TABLET (300 MG TOTAL) BY MOUTH ONCE DAILY.            Patient asked: Patient was seeking understanding of her lab results  particularly her elevated aldosterone level and need to see Endocrinology.   Deferred patient to PCP Area for clarification.     Patient asked:     Patient asked:       Screenings

## 2019-12-02 NOTE — PROGRESS NOTES
Patient called to report elevated reading of 209/77 with no symptoms of hypertension. Patient denies SOB, CP, headache or blurred vision. She was instructed to take one labetalol 100 mg tablet, relax and re-measure her BP in one hour. Take Tekturna at 6p as schedule followed by  Her second dose of labetalol 200 mg this evening before bedtime.     Ms. Lorenz is concerned about her kidney lab results that she recently completed. Patient reports that lab results were in the portal so she went online and goggled meaning of her results. I believe this is why her blood pressure is now elevated. She states that based on her results and findings on Goggle that she would need to be treated with spironolactone which she proceeded to identify reasons why she cannot use spironolactone. I corrected Ms. Lorenz by helping her to understand that she tolerated spironolactone very well when it was used for her blood pressure management. She did not experience any complaints or ADRs to this medication. Spironolactone was discontinued ONLY due to hypotension caused when combined with HCTZ and nifedipine. She has no issues with spironolactone.

## 2019-12-02 NOTE — TELEPHONE ENCOUNTER
Please inform pt that one of the tests ordered is abnormal.  I would like her to consult the Endocrine department regarding the elevated blood pressure and abnormal test.  Also please remind her that I will be drastically cutting back my clinic hours and she needs to establish with a new PCP, thanks

## 2019-12-02 NOTE — TELEPHONE ENCOUNTER
Patient has been informed per Dr. Sellers's msg below, and verbalized understanding.  Call transferred to referral coordinator.

## 2019-12-03 ENCOUNTER — PATIENT OUTREACH (OUTPATIENT)
Dept: ADMINISTRATIVE | Facility: OTHER | Age: 84
End: 2019-12-03

## 2019-12-04 ENCOUNTER — OFFICE VISIT (OUTPATIENT)
Dept: ENDOCRINOLOGY | Facility: CLINIC | Age: 84
End: 2019-12-04
Payer: MEDICARE

## 2019-12-04 ENCOUNTER — OFFICE VISIT (OUTPATIENT)
Dept: URGENT CARE | Facility: CLINIC | Age: 84
End: 2019-12-04
Payer: MEDICARE

## 2019-12-04 ENCOUNTER — PATIENT OUTREACH (OUTPATIENT)
Dept: OTHER | Facility: OTHER | Age: 84
End: 2019-12-04

## 2019-12-04 VITALS
WEIGHT: 143 LBS | HEART RATE: 65 BPM | BODY MASS INDEX: 24.41 KG/M2 | SYSTOLIC BLOOD PRESSURE: 101 MMHG | OXYGEN SATURATION: 98 % | DIASTOLIC BLOOD PRESSURE: 56 MMHG | RESPIRATION RATE: 18 BRPM | HEIGHT: 64 IN | TEMPERATURE: 98 F

## 2019-12-04 VITALS
BODY MASS INDEX: 25.21 KG/M2 | DIASTOLIC BLOOD PRESSURE: 62 MMHG | WEIGHT: 142.31 LBS | SYSTOLIC BLOOD PRESSURE: 158 MMHG | HEIGHT: 63 IN | RESPIRATION RATE: 18 BRPM | HEART RATE: 60 BPM

## 2019-12-04 DIAGNOSIS — J20.8 ACUTE BACTERIAL BRONCHITIS: Primary | ICD-10-CM

## 2019-12-04 DIAGNOSIS — R05.9 COUGH: ICD-10-CM

## 2019-12-04 DIAGNOSIS — R79.9 ABNORMAL BLOOD CHEMISTRY TEST: Primary | ICD-10-CM

## 2019-12-04 DIAGNOSIS — R79.89 ABNORMAL ALDOSTERONE TO RENIN RATIO: ICD-10-CM

## 2019-12-04 DIAGNOSIS — H10.9 BACTERIAL CONJUNCTIVITIS OF BOTH EYES: ICD-10-CM

## 2019-12-04 DIAGNOSIS — B96.89 BACTERIAL CONJUNCTIVITIS OF BOTH EYES: ICD-10-CM

## 2019-12-04 DIAGNOSIS — I10 HYPERTENSION, ESSENTIAL: ICD-10-CM

## 2019-12-04 DIAGNOSIS — B96.89 ACUTE BACTERIAL BRONCHITIS: Primary | ICD-10-CM

## 2019-12-04 DIAGNOSIS — I77.1 SUBCLAVIAN ARTERY STENOSIS, RIGHT: ICD-10-CM

## 2019-12-04 PROCEDURE — 71046 XR CHEST PA AND LATERAL: ICD-10-PCS | Mod: FY,S$GLB,, | Performed by: RADIOLOGY

## 2019-12-04 PROCEDURE — 99214 OFFICE O/P EST MOD 30 MIN: CPT | Mod: 25,S$GLB,, | Performed by: NURSE PRACTITIONER

## 2019-12-04 PROCEDURE — 94640 AIRWAY INHALATION TREATMENT: CPT | Mod: S$GLB,,, | Performed by: EMERGENCY MEDICINE

## 2019-12-04 PROCEDURE — 1126F AMNT PAIN NOTED NONE PRSNT: CPT | Mod: ,,, | Performed by: INTERNAL MEDICINE

## 2019-12-04 PROCEDURE — 99214 OFFICE O/P EST MOD 30 MIN: CPT | Mod: S$PBB,ICN,, | Performed by: INTERNAL MEDICINE

## 2019-12-04 PROCEDURE — 99214 PR OFFICE/OUTPT VISIT, EST, LEVL IV, 30-39 MIN: ICD-10-PCS | Mod: S$PBB,ICN,, | Performed by: INTERNAL MEDICINE

## 2019-12-04 PROCEDURE — 1126F PR PAIN SEVERITY QUANTIFIED, NO PAIN PRESENT: ICD-10-PCS | Mod: ,,, | Performed by: INTERNAL MEDICINE

## 2019-12-04 PROCEDURE — 1159F MED LIST DOCD IN RCRD: CPT | Mod: ,,, | Performed by: INTERNAL MEDICINE

## 2019-12-04 PROCEDURE — 99214 PR OFFICE/OUTPT VISIT, EST, LEVL IV, 30-39 MIN: ICD-10-PCS | Mod: 25,S$GLB,, | Performed by: NURSE PRACTITIONER

## 2019-12-04 PROCEDURE — 99999 PR PBB SHADOW E&M-EST. PATIENT-LVL III: CPT | Mod: PBBFAC,,, | Performed by: INTERNAL MEDICINE

## 2019-12-04 PROCEDURE — 94640 PR INHAL RX, AIRWAY OBST/DX SPUTUM INDUCT: ICD-10-PCS | Mod: S$GLB,,, | Performed by: EMERGENCY MEDICINE

## 2019-12-04 PROCEDURE — 1159F PR MEDICATION LIST DOCUMENTED IN MEDICAL RECORD: ICD-10-PCS | Mod: ,,, | Performed by: INTERNAL MEDICINE

## 2019-12-04 PROCEDURE — 71046 X-RAY EXAM CHEST 2 VIEWS: CPT | Mod: FY,S$GLB,, | Performed by: RADIOLOGY

## 2019-12-04 PROCEDURE — 99213 OFFICE O/P EST LOW 20 MIN: CPT | Mod: PBBFAC | Performed by: INTERNAL MEDICINE

## 2019-12-04 PROCEDURE — 99999 PR PBB SHADOW E&M-EST. PATIENT-LVL III: ICD-10-PCS | Mod: PBBFAC,,, | Performed by: INTERNAL MEDICINE

## 2019-12-04 RX ORDER — IPRATROPIUM BROMIDE 0.5 MG/2.5ML
0.5 SOLUTION RESPIRATORY (INHALATION)
Status: COMPLETED | OUTPATIENT
Start: 2019-12-04 | End: 2019-12-04

## 2019-12-04 RX ORDER — BENZONATATE 100 MG/1
100 CAPSULE ORAL 3 TIMES DAILY PRN
Qty: 30 CAPSULE | Refills: 0 | Status: SHIPPED | OUTPATIENT
Start: 2019-12-04 | End: 2019-12-14

## 2019-12-04 RX ORDER — ALBUTEROL SULFATE 0.83 MG/ML
2.5 SOLUTION RESPIRATORY (INHALATION)
Status: COMPLETED | OUTPATIENT
Start: 2019-12-04 | End: 2019-12-04

## 2019-12-04 RX ORDER — ALBUTEROL SULFATE 90 UG/1
2 AEROSOL, METERED RESPIRATORY (INHALATION) EVERY 6 HOURS PRN
Qty: 1 INHALER | Refills: 0 | Status: SHIPPED | OUTPATIENT
Start: 2019-12-04 | End: 2022-12-09

## 2019-12-04 RX ORDER — DOXYCYCLINE 100 MG/1
100 CAPSULE ORAL EVERY 12 HOURS
Qty: 14 CAPSULE | Refills: 0 | Status: SHIPPED | OUTPATIENT
Start: 2019-12-04 | End: 2019-12-11

## 2019-12-04 RX ORDER — POLYMYXIN B SULFATE AND TRIMETHOPRIM 1; 10000 MG/ML; [USP'U]/ML
1 SOLUTION OPHTHALMIC EVERY 6 HOURS
Qty: 10 ML | Refills: 0 | Status: ON HOLD | OUTPATIENT
Start: 2019-12-04 | End: 2023-06-05 | Stop reason: HOSPADM

## 2019-12-04 RX ADMIN — IPRATROPIUM BROMIDE 0.5 MG: 0.5 SOLUTION RESPIRATORY (INHALATION) at 01:12

## 2019-12-04 RX ADMIN — ALBUTEROL SULFATE 2.5 MG: 0.83 SOLUTION RESPIRATORY (INHALATION) at 01:12

## 2019-12-04 NOTE — PATIENT INSTRUCTIONS
PLEASE READ YOUR DISCHARGE INSTRUCTIONS ENTIRELY AS IT CONTAINS IMPORTANT INFORMATION.      Please take your antibiotics to completion.     Use the albuterol inhaler for wheezing.     Take tessalon perle's as prescribed for cough.     Use the antibiotic drops 4 times daily for 7 days - do not use past 10 days.      Keep hands clean.      Can use saline drops throughout the day if eyes feel dry or irritated.         Please return or see your primary care doctor if you develop new or worsening symptoms (vision changes, pain, fever, swelling around your eye).     Please arrange follow up with your primary medical clinic as soon as possible. You must understand that you've received an Urgent Care treatment only and that you may be released before all of your medical problems are known or treated. You, the patient, will arrange for follow up as instructed. If your symptoms worsen or fail to improve you should go to the Emergency Room.      Bronchitis, Antibiotic Treatment (Adult)    Bronchitis is an infection of the air passages (bronchial tubes) in your lungs. It often occurs when you have a cold. This illness is contagious during the first few days and is spread through the air by coughing and sneezing, or by direct contact (touching the sick person and then touching your own eyes, nose, or mouth).  Symptoms of bronchitis include cough with mucus (phlegm) and low-grade fever. Bronchitis usually lasts 7 to 14 days. Mild cases can be treated with simple home remedies. More severe infection is treated with an antibiotic.  Home care  Follow these guidelines when caring for yourself at home:  · If your symptoms are severe, rest at home for the first 2 to 3 days. When you go back to your usual activities, don't let yourself get too tired.  · Do not smoke. Also avoid being exposed to secondhand smoke.  · You may use over-the-counter medicines to control fever or pain, unless another medicine was prescribed. (Note: If you  have chronic liver or kidney disease or have ever had a stomach ulcer or gastrointestinal bleeding, talk with your healthcare provider before using these medicines. Also talk to your provider if you are taking medicine to prevent blood clots.) Aspirin should never be given to anyone younger than 18 years of age who is ill with a viral infection or fever. It may cause severe liver or brain damage.  · Your appetite may be poor, so a light diet is fine. Avoid dehydration by drinking 6 to 8 glasses of fluids per day (such as water, soft drinks, sports drinks, juices, tea, or soup). Extra fluids will help loosen secretions in the nose and lungs.  · Over-the-counter cough, cold, and sore-throat medicines will not shorten the length of the illness, but they may be helpful to reduce symptoms. (Note: Do not use decongestants if you have high blood pressure.)  · Finish all antibiotic medicine. Do this even if you are feeling better after only a few days.  Follow-up care  Follow up with your healthcare provider, or as advised. If you had an X-ray or ECG (electrocardiogram), a specialist will review it. You will be notified of any new findings that may affect your care.  Note: If you are age 65 or older, or if you have a chronic lung disease or condition that affects your immune system, or you smoke, talk to your healthcare provider about having pneumococcal vaccinations and a yearly influenza vaccination (flu shot).  When to seek medical advice  Call your healthcare provider right away if any of these occur:  · Fever of 100.4°F (38°C) or higher  · Coughing up increased amounts of colored sputum  · Weakness, drowsiness, headache, facial pain, ear pain, or a stiff neck  Call 911, or get immediate medical care  Contact emergency services right away if any of these occur.  · Coughing up blood  · Worsening weakness, drowsiness, headache, or stiff neck  · Trouble breathing, wheezing, or pain with breathing  Date Last Reviewed:  9/13/2015  © 9267-0677 [employer]. [address] [place] [address], [place] [address]. All rights reserved. This information is not intended as a substitute for professional medical care. Always follow your healthcare professional's instructions.

## 2019-12-04 NOTE — LETTER
December 4, 2019      Demario Sellers MD  [address]  [facility] [address]           [facility] - Endocrinology 6th [place]  [address]  [facility] [address]  Phone: [phone]  Fax: [phone]          Patient: Gerda Lai   MR Number: [id number]   YOB: 1933   Date of Visit: 12/4/2019       Dear Dr. Demario Sellers:    Thank you for referring Gerda Lai to me for evaluation. Attached you will find relevant portions of my assessment and plan of care.    If you have questions, please do not hesitate to call me. I look forward to following Gerda Lai along with you.    Sincerely,    Christiano Stahl MD    Enclosure  CC:  No Recipients    If you would like to receive this communication electronically, please contact [email or web address] or [phone] to request more information on [employer] access.    For providers and/or their staff who would like to refer a patient to [facility], please contact us through our one-stop-shop provider referral line, [facility], at [phone].    If you feel you have received this communication in error or would no longer like to receive these types of communications, please e-mail [email or web address]

## 2019-12-04 NOTE — Clinical Note
Dr. Area,See my attached progress note for details. The plasma renin activity is lowered by aliskiren, which is a cause of a false positive ratio. Thanks for the referral!-Anshu Stahl

## 2019-12-04 NOTE — PROGRESS NOTES
"Digital Medicine: Clinician Follow-Up    Ms. Lorenz called to reports that she visited with endocrinologist Favio today to receive results of her labs requested by [facility]. She is pleased to know that her adrenal and renal systems are fine per her understanding of the visit. She also reports that he blood pressure reading today was "low." She recorded 127/56 at home and reports an even lower reading at her endocrinology appointment. She admits that she had no had anything to eat for the day and has not been drinking adequate fluids but questions if she may be taking too much blood pressure medicine.     The history is provided by the patient. No  was used.     Follow Up  Follow-up reason(s): reading review and medication change follow-up      Medication Change: dose increase and stop therapy    Is patient tolerating med change?:  YesOn 11/25: HCTZ and spironolactone were discontinued. Patient had not taken in over a month. Felodipine was discontinued due to complaints of ADRs. Tekturna was continued. Labetolol was increased to 200 mg twice daily.  On 12/2: Patient called concerned about elevated blood pressure of 209/77. She was instructed to take one extra labetalol. She registered a blood pressure of 176/65 later that night.  On 12/3: Patient measured a blood pressure of 190/75.      INTERVENTION(S)  encouragement/support    PLAN  continue monitoring    Current 30-day BP average of 165/72 is uncontrolled, does not meet goal of <150/90.  Reviewed readings: DBP is consistently controlled. SBP has been intermittently controlled. CCB was stopped on 11/25/19.  Encouraged patient to eat three regular meals a day and drink adequate amount of fluids to prevent symptoms of hypotension or hypoglycemia due to poor caloric intake.  Reviewed and discussed previous blood pressure readings with Ms. Lorenz.     Continue current regimen.  Continue to monitor readings.     Follow-up in 2 weeks.      There are " no preventive care reminders to display for this patient.    Last 5 Patient Entered Readings                                      Current 30 Day Average: 165/72     Recent Readings 12/4/2019 12/4/2019 12/3/2019 12/3/2019 12/2/2019    SBP (mmHg) 127 127 190 190 176    DBP (mmHg) 56 56 75 75 65    Pulse 71 71 62 62 64             Hypertension Medications             labetalol (NORMODYNE) 100 MG tablet Take 2 tablets (200 mg total) by mouth every 12 (twelve) hours.    TEKTURNA 300 mg Tab TAKE 1 TABLET (300 MG TOTAL) BY MOUTH ONCE DAILY.                 Screenings

## 2019-12-04 NOTE — PROGRESS NOTES
"Subjective:       Patient ID: Gerda Lai is a 86 y.o. female.    Vitals:  height is 5' 4" (1.626 m) and weight is 64.9 kg (143 lb). Her oral temperature is 98 °F (36.7 °C). Her blood pressure is 101/56 (abnormal) and her pulse is 65. Her respiration is 18 and oxygen saturation is 98%.     Chief Complaint: URI    Pt here today for c/o productive cough and congestion worsening x2 weeks. She tried using a chloraseptic spray as well as Flonase, however symptoms have not improved. Pt states sputum is thick and hard to cough up. PMH significant for pneumonia with hospitalization. Pt also reports bilateral eye discharge, itching, and matting. She has not tried anything for the symptoms. She has appointment later today with Endocrinology to discuss findings of renal ultrasound.     URI    This is a new problem. The current episode started 1 to 4 weeks ago (2 wks). The problem has been gradually worsening. There has been no fever. Associated symptoms include congestion, coughing and wheezing. Pertinent negatives include no ear pain, nausea, rash, sinus pain, sore throat or vomiting. Treatments tried: chloraseptic spray, Flonase. The treatment provided no relief.       Constitution: Negative for chills, sweating, fatigue and fever.   HENT: Positive for congestion and postnasal drip. Negative for ear pain, sinus pain, sinus pressure, sore throat and voice change.    Neck: Negative for painful lymph nodes.   Eyes: Positive for eye discharge, eye itching and eye redness.   Respiratory: Positive for cough, sputum production and wheezing. Negative for chest tightness, bloody sputum, COPD, shortness of breath, stridor and asthma.    Gastrointestinal: Negative for nausea and vomiting.   Musculoskeletal: Negative for muscle ache.   Skin: Negative for rash.   Allergic/Immunologic: Negative for seasonal allergies and asthma.   Hematologic/Lymphatic: Negative for swollen lymph nodes.       Objective:      Physical Exam "   Constitutional: She is oriented to person, place, and time. She appears well-developed and well-nourished. She is cooperative.  Non-toxic appearance. She does not have a sickly appearance. She does not appear ill. No distress.   Pt calm and cooperative during exam. NAD noted.   HENT:   Head: Normocephalic and atraumatic.   Right Ear: Hearing, tympanic membrane, external ear and ear canal normal.   Left Ear: Hearing, tympanic membrane, external ear and ear canal normal.   Nose: Mucosal edema and rhinorrhea present. No nasal deformity. No epistaxis. Right sinus exhibits no maxillary sinus tenderness and no frontal sinus tenderness. Left sinus exhibits no maxillary sinus tenderness and no frontal sinus tenderness.   Mouth/Throat: Uvula is midline and mucous membranes are normal. No trismus in the jaw. Normal dentition. No uvula swelling. Posterior oropharyngeal erythema and cobblestoning present. No oropharyngeal exudate or posterior oropharyngeal edema. Tonsils are 0 on the right. Tonsils are 0 on the left. No tonsillar exudate.   Tonsils surgically absent.   Eyes: Pupils are equal, round, and reactive to light. EOM and lids are normal. Right eye exhibits discharge and exudate. Right eye exhibits no chemosis and no hordeolum. No foreign body present in the right eye. Left eye exhibits discharge and exudate. Left eye exhibits no chemosis and no hordeolum. No foreign body present in the left eye. Right conjunctiva is injected. Right conjunctiva has no hemorrhage. Left conjunctiva is injected. Left conjunctiva has no hemorrhage. Scleral icterus is present.   Neck: Trachea normal, full passive range of motion without pain and phonation normal. Neck supple. No neck rigidity. No edema and no erythema present.   Cardiovascular: Normal rate, regular rhythm, normal heart sounds, intact distal pulses and normal pulses.   Pulmonary/Chest: Effort normal. No respiratory distress. She has no decreased breath sounds. She has  wheezes (expiratory) in the right middle field, the right lower field, the left middle field and the left lower field. She has no rhonchi.   Wheezing improved following nebulizer treatment. Pt tolerated well.   Abdominal: Normal appearance.   Musculoskeletal: Normal range of motion. She exhibits no edema or deformity.   Lymphadenopathy:        Head (right side): Tonsillar adenopathy present. No submental, no submandibular, no preauricular and no posterior auricular adenopathy present.        Head (left side): Tonsillar adenopathy present. No submental, no submandibular, no preauricular and no posterior auricular adenopathy present.     She has no cervical adenopathy.   Neurological: She is alert and oriented to person, place, and time. She exhibits normal muscle tone. Coordination normal.   Skin: Skin is warm, dry, intact, not diaphoretic and not pale.   Psychiatric: She has a normal mood and affect. Her speech is normal and behavior is normal. Judgment and thought content normal. Cognition and memory are normal.   Nursing note and vitals reviewed.      X-ray Chest Pa And Lateral    Result Date: 12/4/2019  EXAMINATION: XR CHEST PA AND LATERAL CLINICAL HISTORY: Cough TECHNIQUE: PA and lateral views of the chest were performed. COMPARISON: 05/12/2018 FINDINGS: Cardiac size is normal.  Lungs are clear with no significant solid a shin or infiltrate.  No evidence of pleural effusion.  Degenerative changes seen in the shoulders particularly the right     See above Electronically signed by: Kalin Villagran MD Date:    12/04/2019 Time:    13:13      Assessment:       1. Acute bacterial bronchitis    2. Bacterial conjunctivitis of both eyes    3. Cough        Plan:         Acute bacterial bronchitis  -     doxycycline (VIBRAMYCIN) 100 MG Cap; Take 1 capsule (100 mg total) by mouth every 12 (twelve) hours. for 7 days  Dispense: 14 capsule; Refill: 0  -     albuterol (PROVENTIL/VENTOLIN HFA) 90 mcg/actuation inhaler; Inhale  2 puffs into the lungs every 6 (six) hours as needed for Wheezing. Rescue  Dispense: 1 Inhaler; Refill: 0    Bacterial conjunctivitis of both eyes  -     polymyxin B sulf-trimethoprim (POLYTRIM) 10,000 unit- 1 mg/mL Drop; Place 1 drop into both eyes every 6 (six) hours.  Dispense: 10 mL; Refill: 0    Cough  -     X-Ray Chest PA And Lateral; Future; Expected date: 12/04/2019  -     ipratropium 0.02 % nebulizer solution 0.5 mg  -     albuterol nebulizer solution 2.5 mg  -     benzonatate (TESSALON) 100 MG capsule; Take 1 capsule (100 mg total) by mouth 3 (three) times daily as needed.  Dispense: 30 capsule; Refill: 0    Pt given strict ER precautions should symptoms fail to improve or worsen-- pt verbalizes understanding.     Patient Instructions       PLEASE READ YOUR DISCHARGE INSTRUCTIONS ENTIRELY AS IT CONTAINS IMPORTANT INFORMATION.      Please take your antibiotics to completion.     Use the albuterol inhaler for wheezing.     Take tessalon perle's as prescribed for cough.     Use the antibiotic drops 4 times daily for 7 days - do not use past 10 days.      Keep hands clean.      Can use saline drops throughout the day if eyes feel dry or irritated.         Please return or see your primary care doctor if you develop new or worsening symptoms (vision changes, pain, fever, swelling around your eye).     Please arrange follow up with your primary medical clinic as soon as possible. You must understand that you've received an Urgent Care treatment only and that you may be released before all of your medical problems are known or treated. You, the patient, will arrange for follow up as instructed. If your symptoms worsen or fail to improve you should go to the Emergency Room.      Bronchitis, Antibiotic Treatment (Adult)    Bronchitis is an infection of the air passages (bronchial tubes) in your lungs. It often occurs when you have a cold. This illness is contagious during the first few days and is spread through  the air by coughing and sneezing, or by direct contact (touching the sick person and then touching your own eyes, nose, or mouth).  Symptoms of bronchitis include cough with mucus (phlegm) and low-grade fever. Bronchitis usually lasts 7 to 14 days. Mild cases can be treated with simple home remedies. More severe infection is treated with an antibiotic.  Home care  Follow these guidelines when caring for yourself at home:  · If your symptoms are severe, rest at home for the first 2 to 3 days. When you go back to your usual activities, don't let yourself get too tired.  · Do not smoke. Also avoid being exposed to secondhand smoke.  · You may use over-the-counter medicines to control fever or pain, unless another medicine was prescribed. (Note: If you have chronic liver or kidney disease or have ever had a stomach ulcer or gastrointestinal bleeding, talk with your healthcare provider before using these medicines. Also talk to your provider if you are taking medicine to prevent blood clots.) Aspirin should never be given to anyone younger than 18 years of age who is ill with a viral infection or fever. It may cause severe liver or brain damage.  · Your appetite may be poor, so a light diet is fine. Avoid dehydration by drinking 6 to 8 glasses of fluids per day (such as water, soft drinks, sports drinks, juices, tea, or soup). Extra fluids will help loosen secretions in the nose and lungs.  · Over-the-counter cough, cold, and sore-throat medicines will not shorten the length of the illness, but they may be helpful to reduce symptoms. (Note: Do not use decongestants if you have high blood pressure.)  · Finish all antibiotic medicine. Do this even if you are feeling better after only a few days.  Follow-up care  Follow up with your healthcare provider, or as advised. If you had an X-ray or ECG (electrocardiogram), a specialist will review it. You will be notified of any new findings that may affect your care.  Note: If you  are age 65 or older, or if you have a chronic lung disease or condition that affects your immune system, or you smoke, talk to your healthcare provider about having pneumococcal vaccinations and a yearly influenza vaccination (flu shot).  When to seek medical advice  Call your healthcare provider right away if any of these occur:  · Fever of 100.4°F (38°C) or higher  · Coughing up increased amounts of colored sputum  · Weakness, drowsiness, headache, facial pain, ear pain, or a stiff neck  Call 911, or get immediate medical care  Contact emergency services right away if any of these occur.  · Coughing up blood  · Worsening weakness, drowsiness, headache, or stiff neck  · Trouble breathing, wheezing, or pain with breathing  Date Last Reviewed: 9/13/2015  © 7463-9706 [employer]. [address] [place] [address], [place] [address]. All rights reserved. This information is not intended as a substitute for professional medical care. Always follow your healthcare professional's instructions.

## 2019-12-04 NOTE — PROGRESS NOTES
Subjective:      Chief Complaint: Consult for high aldosterone/renin ratio    HPI: Gerda Lai is a 86 y.o. female who is here for an initial evaluation for hypertension with abnormal PAC/PRA ratio.    Past Medical History:   Diagnosis Date    Arthritis     Basal cell carcinoma 09/2016    right post auricular neck     Breast cancer 1992    right    Cataract     Fibromyalgia     History of measles as a child     Hyperlipidemia     Hypertension     Personal history of colonic polyps     Pneumonia     SCC (squamous cell carcinoma) 2015    R chest    SCC (squamous cell carcinoma) 2016    left medial shoulder    SCC (squamous cell carcinoma) 2017    left knee    Shingles     Squamous cell carcinoma 2015    right forearm    Thyroid disease     Vaginitis      Reports history of hypertension for many years. She has been on several classes of BP meds, but has been intolerant to many. Most recently, she is being treated with labetalol 200 mg BID and aliskiren 300 mg daily.     Review of digital hypertension readings reveals systolic pressures in the 130-200 range and diastolic bp ranging from the 50s-80s. She says that when her systolic blood pressure is below 120, she feels lightheaded like she is going to pass out.    She has a history of breast cancer at age 59 and was treated with surgery and radiation. Since then, she reports her bp always reads low on the right arm.    She is currently asymptomatic with no complaints of chest pain, headaches or shortness of breath.      Reviewed past medical, family, social history and updated as appropriate.    Review of Systems   Constitutional: Negative for unexpected weight change.   Eyes: Negative for visual disturbance.   Respiratory: Negative for shortness of breath.    Cardiovascular: Negative for chest pain.   Gastrointestinal: Negative for abdominal pain.   Genitourinary: Negative for urgency.   Musculoskeletal: Positive for arthralgias.   Skin: Negative  for wound.   Neurological: Negative for headaches.   Hematological: Does not bruise/bleed easily.   Psychiatric/Behavioral: Negative for sleep disturbance.     Objective:     Vitals:    12/04/19 1436   BP: (!) 158/62 (left arm); could not obtain blood pressure in right arm   Pulse: 60   Resp: 18     BP Readings from Last 5 Encounters:   12/04/19 (!) 158/62   11/25/19 (!) 160/58   11/18/19 (!) 128/55   11/14/19 (!) 120/58         Physical Exam   Constitutional: She is oriented to person, place, and time. She appears well-developed.   HENT:   Right Ear: External ear normal.   Left Ear: External ear normal.   Nose: Nose normal.   Hearing grossly normal  Dentition grossly normal   Neck: No tracheal deviation present. No thyromegaly present.   Cardiovascular: Normal rate, regular rhythm and normal heart sounds.   No murmur heard.  Right radial pulse is not palpable.  Could not hear Korotkoff sounds on attempt to check BP in right arm   Pulmonary/Chest: Effort normal and breath sounds normal.   Abdominal: Soft. She exhibits no mass. There is no tenderness.   Musculoskeletal: She exhibits no edema.   No digital clubbing or extremity cyanosis.  Right hand is normal temperature with no evidence of acute limb ischemia. Sensation and motor function intact.   Neurological: She is alert and oriented to person, place, and time. Coordination normal.   Skin: No rash noted.   No subcutaneous nodules noted.   Psychiatric: She has a normal mood and affect. Her behavior is normal.   Alert and oriented to person, place, and situation.   Nursing note and vitals reviewed.      Wt Readings from Last 10 Encounters:   12/04/19 1436 64.5 kg (142 lb 4.9 oz)   12/04/19 1214 64.9 kg (143 lb)   11/25/19 1637 64.9 kg (143 lb 1.3 oz)   11/18/19 1326 64.9 kg (143 lb)   11/14/19 1315 64 kg (141 lb)   10/31/19 1306 64 kg (141 lb)   10/21/19 1120 64 kg (141 lb)   10/17/19 1551 64.3 kg (141 lb 12.1 oz)   07/29/19 1637 64.4 kg (142 lb)   07/24/19 1528  "64.6 kg (142 lb 6.7 oz)       Lab Results   Component Value Date    HGBA1C 5.5 05/27/2011     Lab Results   Component Value Date    CHOL 184 04/04/2019    CHOL 175 01/03/2019    HDL 55 04/04/2019    HDL 47 01/03/2019    LDLCALC 104.6 04/04/2019    LDLCALC 99.2 01/03/2019    TRIG 122 04/04/2019    TRIG 144 01/03/2019    CHOLHDL 29.9 04/04/2019    CHOLHDL 26.9 01/03/2019     Lab Results   Component Value Date     07/02/2019    K 4.5 07/02/2019     07/02/2019    CO2 22 (L) 07/02/2019    GLU 91 07/02/2019    BUN 28 (H) 07/02/2019    CREATININE 1.0 07/02/2019    CALCIUM 9.8 07/02/2019    PROT 6.7 04/04/2019    ALBUMIN 4.0 04/04/2019    BILITOT 0.8 04/04/2019    ALKPHOS 61 04/04/2019    AST 21 04/04/2019    ALT 14 04/04/2019    ANIONGAP 11 07/02/2019    ESTGFRAFRICA 58.9 (A) 07/02/2019    EGFRNONAA 51.1 (A) 07/02/2019    TSH 0.940 02/11/2019      Lab Results   Component Value Date    MICALBCREAT 9.7 05/17/2017       Assessment/Plan:     Hypertension, essential  Low suspicion for secondary hypertension. She has a very wide pulse pressure, likely indicative of poorly compliant blood vessels. This is evidenced by the fact that she becomes orthostatic when her systolic blood pressure is in the "normal range".    Plasma renin activity is lowered significantly by aliskiren, so we cannot interpret the renin/aldosterone ratio while she is on this medication. Aliskiren is one of the causes of a falsely elevated PAC/PRA. Regardless, suspicion for primary hyperaldosteronism is low, and holding aliskiren in the absence of viable alternative agents would likely be more harmful than helpful. If there was legitimate concern, spironolactone could be added to her regimen with close monitoring of serum potassium, although I don't think it's needed at this point.    Subclavian artery stenosis, right  Suspect significant subclavian artery stenosis, likely related to previous radiation therapy. Review of previous CT scan from 2016 " and the one from October, 2019 shows significant calcifications in the right subclavian artery.    She has no evidence of acute limb ischemia, and aside from some rotator cuff issues, she does not seem to be symptomatic. Would defer to cardiology to decide if intervention is needed.    Abnormal aldosterone/renin ratio  See hypertension. Aliskiren lowers PRA, causing a falsely elevated ratio.    Cc: referring provider

## 2019-12-05 ENCOUNTER — PATIENT OUTREACH (OUTPATIENT)
Dept: OTHER | Facility: OTHER | Age: 84
End: 2019-12-05

## 2019-12-05 ENCOUNTER — OFFICE VISIT (OUTPATIENT)
Dept: OPTOMETRY | Facility: CLINIC | Age: 84
End: 2019-12-05
Payer: MEDICARE

## 2019-12-05 DIAGNOSIS — I10 HYPERTENSION, ESSENTIAL: ICD-10-CM

## 2019-12-05 DIAGNOSIS — H04.123 DRY EYES, BILATERAL: Primary | ICD-10-CM

## 2019-12-05 PROCEDURE — 99212 OFFICE O/P EST SF 10 MIN: CPT | Mod: PBBFAC,PO | Performed by: OPTOMETRIST

## 2019-12-05 PROCEDURE — 92012 INTRM OPH EXAM EST PATIENT: CPT | Mod: S$PBB,,, | Performed by: OPTOMETRIST

## 2019-12-05 PROCEDURE — 99999 PR PBB SHADOW E&M-EST. PATIENT-LVL II: ICD-10-PCS | Mod: PBBFAC,,, | Performed by: OPTOMETRIST

## 2019-12-05 PROCEDURE — 92012 PR EYE EXAM, EST PATIENT,INTERMED: ICD-10-PCS | Mod: S$PBB,,, | Performed by: OPTOMETRIST

## 2019-12-05 PROCEDURE — 99999 PR PBB SHADOW E&M-EST. PATIENT-LVL II: CPT | Mod: PBBFAC,,, | Performed by: OPTOMETRIST

## 2019-12-05 RX ORDER — LORAZEPAM 0.5 MG/1
TABLET ORAL
Qty: 30 TABLET | Refills: 0 | OUTPATIENT
Start: 2019-12-05

## 2019-12-05 NOTE — ASSESSMENT & PLAN NOTE
"Low suspicion for secondary hypertension. She has a very wide pulse pressure, likely indicative of poorly compliant blood vessels. This is evidenced by the fact that she becomes orthostatic when her systolic blood pressure is in the "normal range".    Plasma renin activity is lowered significantly by aliskiren, so we cannot interpret the renin/aldosterone ratio while she is on this medication. Aliskiren is one of the causes of a falsely elevated PAC/PRA. Regardless, suspicion for primary hyperaldosteronism is low, and holding aliskiren in the absence of viable alternative agents would likely be more harmful than helpful. If there was legitimate concern, spironolactone could be added to her regimen with close monitoring of serum potassium, although I don't think it's needed at this point.  "

## 2019-12-05 NOTE — PROGRESS NOTES
Call pushed due to recent contact with clinician. Will follow up next week about task related to PA.

## 2019-12-05 NOTE — ASSESSMENT & PLAN NOTE
Suspect significant subclavian artery stenosis, likely related to previous radiation therapy. Review of previous CT scan from 2016 and the one from October, 2019 shows significant calcifications in the right subclavian artery.    She has no evidence of acute limb ischemia, and aside from some rotator cuff issues, she does not seem to be symptomatic. Would defer to cardiology to decide if intervention is needed.

## 2019-12-05 NOTE — PROGRESS NOTES
"HPI     DLS:11/7/19  Pt states in both eyes  started to turn red on Monday and waking up with   crust over her eye, yesterday it got worse she went to the urgent care.   They gave her POLYTRIM and she states it burner her eye very bad and she   has not used it since yesterday.   Occ. Floaters , no flashes   Refresh gtts --only using once a day, and not every day    Last edited by Luis Daniel Linares, OD on 12/5/2019  3:14 PM. (History)        ROS     Positive for: Eyes (cat surgery OU)    Negative for: Constitutional, Gastrointestinal, Neurological, Skin,   Genitourinary, Musculoskeletal, HENT, Endocrine, Cardiovascular,   Respiratory, Psychiatric, Allergic/Imm, Heme/Lymph    Last edited by Luis Daniel Linares, OD on 12/5/2019  3:14 PM. (History)        Assessment /Plan     For exam results, see Encounter Report.    Dry eyes, bilateral      See notes from last month:  1. Mild pco OD sp pciol ou/YAG OS--pt happy w spex Rx  2. DAMION/rosacea--pt to cont w ATs    Pt presents TODAY because for the past few days has had some slight crust in AM and redness.  Has NOT been using ATs as directed. Went to [facility] yesterday for another issue, and brought up her eye problems.  They Dx "bact conj" and gave her POLYTRIM which she used once but it burned so bad she didn't use again, and came to see me today.  Reassured pt there is no signs of infection today.  Her problems are the dry eye issues we have discussed in the past    PLAN:    1. Restart SOOTHE XP ATs QID  2. Ok to use OCUSOFT eyelid cleanser for morning crust if needed  3. Pt will call if sx persist w ATs, and will do short course of steroids.  Otherwise rtc as sched for full exam                 "

## 2019-12-06 DIAGNOSIS — I10 HYPERTENSION, ESSENTIAL: ICD-10-CM

## 2019-12-06 RX ORDER — LORAZEPAM 0.5 MG/1
0.25 TABLET ORAL 2 TIMES DAILY PRN
Qty: 30 TABLET | Refills: 0 | Status: SHIPPED | OUTPATIENT
Start: 2019-12-06 | End: 2019-12-09 | Stop reason: SDUPTHER

## 2019-12-06 RX ORDER — SPIRONOLACTONE 50 MG/1
TABLET, FILM COATED ORAL
COMMUNITY
Start: 2019-11-30 | End: 2019-12-12 | Stop reason: CLARIF

## 2019-12-06 NOTE — TELEPHONE ENCOUNTER
----- Message from Britt Reina sent at 12/6/2019 10:33 AM CST -----  Contact: self   Patient is calling for an RX refill or new RX.  Is this a refill or new RX:  new  RX name and strength: LORazepam (ATIVAN) 0.5 MG tablet  Directions (copy/paste from chart):  TAKE 1/2 TABLET BY MOUTH TWICE A DAY AS NEEDED FOR SEVERE ANXIETY  Is this a 30 day or 90 day RX:    Local pharmacy or mail order pharmacy:  local  Pharmacy name and phone # (copy/paste from chart):   [facility]/pharmacy #[id number] - [place], [place] - [address] [phone] (Phone)  [phone] (Fax)  Comments:  Pt states the pharmacy does not have the Rx

## 2019-12-09 DIAGNOSIS — I10 HYPERTENSION, ESSENTIAL: ICD-10-CM

## 2019-12-09 RX ORDER — LORAZEPAM 0.5 MG/1
TABLET ORAL
Qty: 30 TABLET | Refills: 0 | Status: SHIPPED | OUTPATIENT
Start: 2019-12-09 | End: 2020-01-17 | Stop reason: SDUPTHER

## 2019-12-09 NOTE — TELEPHONE ENCOUNTER
----- Message from Jesi Maynard sent at 12/9/2019  8:52 AM CST -----  Contact: [phone]  Type: Rx    Name of medication(s): LORazepam (ATIVAN) 0.5 MG tablet    Is this a refill? New rx? Refill     Pharmacy Name, Phone, & Location:[facility]/pharmacy #[id number] - [place], [place] - [address] [place] [address]   [phone] (Phone)  [phone] (Fax)      Comments:please advise, thanks

## 2019-12-12 ENCOUNTER — PATIENT OUTREACH (OUTPATIENT)
Dept: OTHER | Facility: OTHER | Age: 84
End: 2019-12-12

## 2019-12-12 RX ORDER — DICLOFENAC SODIUM 10 MG/G
GEL TOPICAL
Qty: 100 G | Refills: 6 | Status: SHIPPED | OUTPATIENT
Start: 2019-12-12 | End: 2020-07-28 | Stop reason: CLARIF

## 2019-12-12 NOTE — PROGRESS NOTES
"Digital Medicine: Clinician Follow-Up    Gerda Lai submitted a reading of 238/87 at 12/11/2019  7:01 PM. Called patient for hypertension follow-up. Patient denies s/sx of hypertension: no CP, SOB, blurred vision or headache but admits that she took a half (100 mg) of labetalol due to the higher readings. Ms. Lorenz reports that she's not feeling well today. She has been treating the pink eye and states she received a breathing treatment from Urgent Care on a recent visit. She reports that she thinks something is wrong with her machine as it is reading higher than her other device at home which gave her a measurement of "170 over something." She confirmed that spironolactone was discontinued on 12/1/19 by PCP Area and she is not currently taking. Her current regimen is labetalol and Tekturna.     The history is provided by the patient. No  was used.     Follow Up  Follow-up reason(s): reading review      Alert received.   Care Team received high BP alert.  Patient is not experiencing symptoms.  Reading was invalid because Patient has not charged BP device in a while      INTERVENTION(S)  encouragement/support    PLAN  patient verbalizes understanding and continue monitoring    Current 30-day BP avg of 175/75 is uncontrolled, does not meet goal of <150/90.  Reviewed readings: trending upwards where DBP continues to be well-controlled and SBP is consistently elevated above goal.     Continue current regimen.  Advised to charge her BP device. If elevated readings continue to occur compared to secondary home device, consider exchanging device at [facility].  Follow-up in 2 weeks.       There are no preventive care reminders to display for this patient.    Last 5 Patient Entered Readings                                      Current 30 Day Average: 175/75     Recent Readings 12/11/2019 12/11/2019 12/11/2019 12/11/2019 12/11/2019    SBP (mmHg) 200 200 233 233 238    DBP (mmHg) 85 85 92 92 87    Pulse 56 " 56 60 60 58             Hypertension Medications             labetalol (NORMODYNE) 100 MG tablet Take 2 tablets (200 mg total) by mouth every 12 (twelve) hours.    spironolactone (ALDACTONE) 50 MG tablet     TEKTURNA 300 mg Tab TAKE 1 TABLET (300 MG TOTAL) BY MOUTH ONCE DAILY.                 Screenings

## 2019-12-16 ENCOUNTER — PATIENT OUTREACH (OUTPATIENT)
Dept: OTHER | Facility: OTHER | Age: 84
End: 2019-12-16

## 2019-12-16 NOTE — PROGRESS NOTES
"Digital Medicine: Clinician Follow-Up    Ms. Lorenz returned missed phone call. Patient reports having a high reading on Friday and taking an extra half-tablet of labetalol. She also reports doing the same on Saturday and Sunday; however she did not measure her blood pressure on these days. She took the extra half-tablet based on how she felt.    The history is provided by the patient. No  was used.     Follow Up  Follow-up reason(s): reading review          INTERVENTION(S)  encouragement/support    PLAN  patient verbalizes understanding and continue monitoring    Current 30-day BP avg of 182/76 is uncontrolled, does not meet goal of <150/90.  Reviewed readings: Both SBP and DBP are trending upwards where DBP continues to be consistently controlled and SBP are consistently elevated above goal.   Counseled patient not to take extra half-tablet of labetalol regularly. If she feels extra half-tablet of labetalol is needed, then her prescription needs to be updated to document the dose increase.   Advise patient not to change dose of blood pressure medication based on "how she feels." Requested BP readings to support.    Continue current regimen.    Follow-up in 4 weeks.       There are no preventive care reminders to display for this patient.    Last 5 Patient Entered Readings                                      Current 30 Day Average: 182/76     Recent Readings 12/13/2019 12/13/2019 12/13/2019 12/13/2019 12/11/2019    SBP (mmHg) 213 213 204 204 200    DBP (mmHg) 81 81 80 80 85    Pulse 57 57 63 63 56             Hypertension Medications             labetalol (NORMODYNE) 100 MG tablet Take 2 tablets (200 mg total) by mouth every 12 (twelve) hours.    TEKTURNA 300 mg Tab TAKE 1 TABLET (300 MG TOTAL) BY MOUTH ONCE DAILY.                 Screenings  "

## 2019-12-23 ENCOUNTER — PATIENT OUTREACH (OUTPATIENT)
Dept: OTHER | Facility: OTHER | Age: 84
End: 2019-12-23

## 2019-12-23 DIAGNOSIS — I10 ESSENTIAL HYPERTENSION: ICD-10-CM

## 2019-12-23 RX ORDER — LABETALOL 100 MG/1
250 TABLET, FILM COATED ORAL EVERY 12 HOURS
Qty: 180 TABLET | Refills: 3 | Status: SHIPPED | OUTPATIENT
Start: 2019-12-23 | End: 2020-01-23 | Stop reason: DRUGHIGH

## 2019-12-23 NOTE — PROGRESS NOTES
"Digital Medicine: Clinician Follow-Up    Gerda Lai submitted a reading of 209/87 at 12/22/2019  9:04 AM. Patient took an extra half-tablet of labetalol with elevated reading. She admits that she was in a stressful environment as she was not home. She was visiting her brother in [place] where she complains that it was not the most comfortable or convenient environment for measuring her blood pressure. She is presently out [employer] shopping. She denies s/sx of hypertension - CP, SOB, blurred vision or headache. She states that she usually has dizziness when her blood pressure is elevated but also understands that dizziness is a symptom for when the blood pressure is low as well. "The extra half-tablet made me feel better but I didn't measure my blood pressure to see how it was doing." She expresses the need to develop a routine in order to measure her blood pressure and take her medications so that will be her focus over the next two weeks.     The history is provided by the patient. No  was used.     Follow Up  Follow-up reason(s): reading review and medication change      Alert received.   Care Team received high BP alert.  Patient is not experiencing symptoms.  Medication Change: dose increase        INTERVENTION(S)  reviewed appropriate dose schedule, recommended med change and encouragement/support    PLAN  patient verbalizes understanding, patient amenable to changes and continue monitoring    Current BP avg of 187/77 is uncontrolled and does not meet goal of <150/90.  Reviewed readings: SBP is consistently elevated above goal; DBP is consistently controlled. BP readings have been more elevated since discontinuing CCB which controll patient's BP very well but poorly tolerated by patient due to complaints of ADRs.  Discussed elevated readings with Ms. Lornez and concern that BP has remained in the 200's since stopping CCB. Need to consider increasing labetalol for better control. " "Patient agrees. She has been taking extra half-tablet "if she feels bad."    Increase labetalol to 2.5 tablets (250 mg) twice daily for better BP control.  Continue Tekturna as prescribed.  Continue measuring BP daily for monitoring.  Advised patient to call [facility] main line if she needs to speak with clinical pharmacist over the next week due to the holiday schedule. Also reminded patient to call [facility] on Call or 911 for assistance if hypertensive emergency occurs. Patient expressed understanding.    Follow-up in 2 weeks.      There are no preventive care reminders to display for this patient.    Last 5 Patient Entered Readings                                      Current 30 Day Average: 187/77     Recent Readings 12/22/2019 12/22/2019 12/16/2019 12/16/2019 12/16/2019    SBP (mmHg) 209 209 205 205 206    DBP (mmHg) 87 87 80 80 77    Pulse 59 59 53 53 55          Hypertension Medications             labetalol (NORMODYNE) 100 MG tablet Take 2.5 tablets (250 mg total) by mouth every 12 (twelve) hours.    TEKTURNA 300 mg Tab TAKE 1 TABLET (300 MG TOTAL) BY MOUTH ONCE DAILY.                 Screenings  "

## 2019-12-26 NOTE — PROGRESS NOTES
Digital Medicine: Health  Follow-Up    Ms. Lorenz reports that she's doing well today. She's been in frequent contact with her clinician, April, about her elevated BP readings.     The history is provided by the patient.     Follow Up  Follow-up reason(s): reading review, routine education and responding to task      Routine Education Topics: eating patterns and physical activity        INTERVENTION(S)  recommend physical activity and encouragement/support    PLAN  patient verbalizes understanding    Patient asked to follow up in a few weeks to check in on exercise goal.       There are no preventive care reminders to display for this patient.    Last 5 Patient Entered Readings                                      Current 30 Day Average: 191/78     Recent Readings 12/23/2019 12/23/2019 12/22/2019 12/22/2019 12/16/2019    SBP (mmHg) 197 197 209 209 205    DBP (mmHg) 85 85 87 87 80    Pulse 55 55 59 59 53            Diet Screening       Barriers to a Healthy Diet: holidays/special events    Patient reports that her diets been everywhere recently with the holidays, but she's trying to get back on track. Normally she tries to eat well and consumes a lot of salads.     Physical Activity Screening   When asked if exercising, patient responded: no    She identified the following barriers to physical activity: motivation    Patient likes to do water aerobics and her goal is to go back to [facility] to use the pool. She's already a member and she pays monthly. Her goal is to start using her membership.

## 2019-12-30 ENCOUNTER — HOSPITAL ENCOUNTER (EMERGENCY)
Facility: HOSPITAL | Age: 84
Discharge: HOME | End: 2019-12-30
Attending: EMERGENCY MEDICINE
Payer: MEDICARE

## 2019-12-30 VITALS
DIASTOLIC BLOOD PRESSURE: 80 MMHG | HEIGHT: 63 IN | BODY MASS INDEX: 25.34 KG/M2 | OXYGEN SATURATION: 96 % | HEART RATE: 90 BPM | SYSTOLIC BLOOD PRESSURE: 162 MMHG | TEMPERATURE: 97 F | WEIGHT: 143 LBS | RESPIRATION RATE: 18 BRPM

## 2019-12-30 DIAGNOSIS — R05.9 COUGH IN PEDIATRIC PATIENT: Primary | ICD-10-CM

## 2019-12-30 DIAGNOSIS — R06.02 SOB (SHORTNESS OF BREATH): ICD-10-CM

## 2019-12-30 LAB
ALBUMIN SERPL BCP-MCNC: 3.5 G/DL (ref 3.5–5.2)
ALP SERPL-CCNC: 95 U/L (ref 55–135)
ALT SERPL W/O P-5'-P-CCNC: 24 U/L (ref 10–44)
ANION GAP SERPL CALC-SCNC: 10 MMOL/L (ref 8–16)
AST SERPL-CCNC: 26 U/L (ref 10–40)
BASOPHILS # BLD AUTO: 0.05 K/UL (ref 0–0.2)
BASOPHILS NFR BLD: 0.4 % (ref 0–1.9)
BILIRUB SERPL-MCNC: 1.2 MG/DL (ref 0.1–1)
BILIRUB UR QL STRIP: NEGATIVE
BNP SERPL-MCNC: 141 PG/ML (ref 0–99)
BUN SERPL-MCNC: 18 MG/DL (ref 8–23)
CALCIUM SERPL-MCNC: 9.4 MG/DL (ref 8.7–10.5)
CHLORIDE SERPL-SCNC: 107 MMOL/L (ref 95–110)
CLARITY UR REFRACT.AUTO: CLEAR
CO2 SERPL-SCNC: 23 MMOL/L (ref 23–29)
COLOR UR AUTO: YELLOW
CREAT SERPL-MCNC: 0.9 MG/DL (ref 0.5–1.4)
DIFFERENTIAL METHOD: ABNORMAL
EOSINOPHIL # BLD AUTO: 0.1 K/UL (ref 0–0.5)
EOSINOPHIL NFR BLD: 1.1 % (ref 0–8)
ERYTHROCYTE [DISTWIDTH] IN BLOOD BY AUTOMATED COUNT: 12.1 % (ref 11.5–14.5)
EST. GFR  (AFRICAN AMERICAN): >60 ML/MIN/1.73 M^2
EST. GFR  (NON AFRICAN AMERICAN): 58.1 ML/MIN/1.73 M^2
GLUCOSE SERPL-MCNC: 111 MG/DL (ref 70–110)
GLUCOSE UR QL STRIP: NEGATIVE
HCT VFR BLD AUTO: 33 % (ref 37–48.5)
HGB BLD-MCNC: 10.7 G/DL (ref 12–16)
HGB UR QL STRIP: NEGATIVE
IMM GRANULOCYTES # BLD AUTO: 0.05 K/UL (ref 0–0.04)
IMM GRANULOCYTES NFR BLD AUTO: 0.4 % (ref 0–0.5)
KETONES UR QL STRIP: NEGATIVE
LACTATE SERPL-SCNC: 1.1 MMOL/L (ref 0.5–2.2)
LEUKOCYTE ESTERASE UR QL STRIP: NEGATIVE
LYMPHOCYTES # BLD AUTO: 1.8 K/UL (ref 1–4.8)
LYMPHOCYTES NFR BLD: 14.2 % (ref 18–48)
MCH RBC QN AUTO: 32.6 PG (ref 27–31)
MCHC RBC AUTO-ENTMCNC: 32.4 G/DL (ref 32–36)
MCV RBC AUTO: 101 FL (ref 82–98)
MONOCYTES # BLD AUTO: 1.2 K/UL (ref 0.3–1)
MONOCYTES NFR BLD: 10 % (ref 4–15)
NEUTROPHILS # BLD AUTO: 9.1 K/UL (ref 1.8–7.7)
NEUTROPHILS NFR BLD: 73.9 % (ref 38–73)
NITRITE UR QL STRIP: NEGATIVE
NRBC BLD-RTO: 0 /100 WBC
PH UR STRIP: 5 [PH] (ref 5–8)
PLATELET # BLD AUTO: 230 K/UL (ref 150–350)
PMV BLD AUTO: 9.1 FL (ref 9.2–12.9)
POTASSIUM SERPL-SCNC: 3.7 MMOL/L (ref 3.5–5.1)
PROT SERPL-MCNC: 7 G/DL (ref 6–8.4)
PROT UR QL STRIP: NEGATIVE
RBC # BLD AUTO: 3.28 M/UL (ref 4–5.4)
SODIUM SERPL-SCNC: 140 MMOL/L (ref 136–145)
SP GR UR STRIP: 1.02 (ref 1–1.03)
TROPONIN I SERPL DL<=0.01 NG/ML-MCNC: <0.006 NG/ML (ref 0–0.03)
URN SPEC COLLECT METH UR: NORMAL
WBC # BLD AUTO: 12.37 K/UL (ref 3.9–12.7)

## 2019-12-30 PROCEDURE — 83880 ASSAY OF NATRIURETIC PEPTIDE: CPT

## 2019-12-30 PROCEDURE — 84484 ASSAY OF TROPONIN QUANT: CPT

## 2019-12-30 PROCEDURE — 81003 URINALYSIS AUTO W/O SCOPE: CPT

## 2019-12-30 PROCEDURE — 99285 EMERGENCY DEPT VISIT HI MDM: CPT | Mod: 25

## 2019-12-30 PROCEDURE — 93010 EKG 12-LEAD: ICD-10-PCS | Mod: ,,, | Performed by: INTERNAL MEDICINE

## 2019-12-30 PROCEDURE — 93010 ELECTROCARDIOGRAM REPORT: CPT | Mod: ,,, | Performed by: INTERNAL MEDICINE

## 2019-12-30 PROCEDURE — 94640 AIRWAY INHALATION TREATMENT: CPT

## 2019-12-30 PROCEDURE — 87040 BLOOD CULTURE FOR BACTERIA: CPT

## 2019-12-30 PROCEDURE — 99285 EMERGENCY DEPT VISIT HI MDM: CPT | Mod: ,,, | Performed by: EMERGENCY MEDICINE

## 2019-12-30 PROCEDURE — 80053 COMPREHEN METABOLIC PANEL: CPT

## 2019-12-30 PROCEDURE — 93005 ELECTROCARDIOGRAM TRACING: CPT

## 2019-12-30 PROCEDURE — 85025 COMPLETE CBC W/AUTO DIFF WBC: CPT

## 2019-12-30 PROCEDURE — 25000242 PHARM REV CODE 250 ALT 637 W/ HCPCS: Performed by: EMERGENCY MEDICINE

## 2019-12-30 PROCEDURE — 83605 ASSAY OF LACTIC ACID: CPT

## 2019-12-30 PROCEDURE — 99285 PR EMERGENCY DEPT VISIT,LEVEL V: ICD-10-PCS | Mod: ,,, | Performed by: EMERGENCY MEDICINE

## 2019-12-30 RX ORDER — ALBUTEROL SULFATE 90 UG/1
2 AEROSOL, METERED RESPIRATORY (INHALATION)
Status: COMPLETED | OUTPATIENT
Start: 2019-12-30 | End: 2019-12-30

## 2019-12-30 RX ADMIN — ALBUTEROL SULFATE 2 PUFF: 90 AEROSOL, METERED RESPIRATORY (INHALATION) at 09:12

## 2019-12-30 NOTE — ED TRIAGE NOTES
Pt reports having productive green sputum for the past few weeks. Went to urgent care then and has been taking antibiotics and started to feel better after taking. Denies SOB and Chest pain. Denies body aches, N/V/F/D. + nasal congestion

## 2019-12-30 NOTE — ED PROVIDER NOTES
Encounter Date: 12/30/2019    SCRIBE #1 NOTE: I, Devang Mccarthy, am scribing for, and in the presence of, Dr. Sahu.       History     Chief Complaint   Patient presents with    Coughing     greenish sputum, wheezing noted in triage     86 y.o. Woman with PMHx of HTN, HLD presents with a chief complaint of cough. Pt states she was given a breathing treatment abx and albuterol a few weeks ago. States she felt better after a while but over the last week it has come back. Ednorses productive cough with greenish sputum. Denies N/V/D or abdominal pain. Denies leg swelling or CP.States last night her legs were more swollen than usual.     The history is provided by the patient.     Review of patient's allergies indicates:   Allergen Reactions    Sulfa (sulfonamide antibiotics) Rash    Clarithromycin Other (See Comments)     Weak, extreme fatigue. dizziness    Flexeril [cyclobenzaprine] Other (See Comments)     Dizziness    Iodine and iodide containing products     Lisinopril Other (See Comments)     Cough and sensation of throat swelling/?angioedema    Losartan Rash    Metoprolol Swelling     Tightness in throat    Tramadol Other (See Comments)     Dizzy and weak    Verapamil (bulk) Palpitations    Voltaren [diclofenac sodium] Other (See Comments)     Drops blood pressure     Past Medical History:   Diagnosis Date    Arthritis     Basal cell carcinoma 09/2016    right post auricular neck     Breast cancer 1992    right    Cataract     Fibromyalgia     History of measles as a child     Hyperlipidemia     Hypertension     Personal history of colonic polyps     Pneumonia     SCC (squamous cell carcinoma) 2015    R chest    SCC (squamous cell carcinoma) 2016    left medial shoulder    SCC (squamous cell carcinoma) 2017    left knee    Shingles     Squamous cell carcinoma 2015    right forearm    Thyroid disease     Vaginitis      Past Surgical History:   Procedure Laterality Date    ADENOIDECTOMY       BREAST BIOPSY Left     Excisional bx, benign    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    CATARACT EXTRACTION W/  INTRAOCULAR LENS IMPLANT Bilateral     EYE SURGERY      JOINT REPLACEMENT      thyriodectomy      partial    tonsillectomy      TONSILLECTOMY      TOTAL HIP ARTHROPLASTY      right    Yag  Left      Family History   Problem Relation Age of Onset    Breast cancer Mother     Cancer Mother     Stroke Paternal Grandfather     Heart disease Father     Cancer Paternal Aunt     Heart disease Paternal Aunt     Glaucoma Paternal Grandmother     Amblyopia Neg Hx     Blindness Neg Hx     Cataracts Neg Hx     Diabetes Neg Hx     Hypertension Neg Hx     Macular degeneration Neg Hx     Retinal detachment Neg Hx     Strabismus Neg Hx     Thyroid disease Neg Hx     Melanoma Neg Hx      Social History     Tobacco Use    Smoking status: Never Smoker    Smokeless tobacco: Never Used   Substance Use Topics    Alcohol use: Yes     Comment: socially - celebrations only    Drug use: No     Review of Systems   Constitutional: Negative for chills and fever.   HENT: Negative for sore throat.    Respiratory: Positive for cough. Negative for shortness of breath.    Cardiovascular: Negative for chest pain.   Gastrointestinal: Negative for nausea.   Genitourinary: Negative for dysuria.   Musculoskeletal: Negative for back pain.   Skin: Negative for rash.   Neurological: Negative for weakness.   Hematological: Does not bruise/bleed easily.       Physical Exam     Initial Vitals [12/30/19 1517]   BP Pulse Resp Temp SpO2   (!) 195/73 78 18 99.4 °F (37.4 °C) 96 %      MAP       --         Vitals:    12/30/19 1517 12/30/19 1800 12/30/19 2106 12/30/19 2115   BP: (!) 195/73 (!) 188/84 (!) 162/80    BP Location: Left arm  Left arm    Patient Position: Sitting  Sitting    Pulse: 78 75 92 90   Resp: 18 17 18 18   Temp: 99.4 °F (37.4 °C) 99 °F (37.2 °C) 97 °F (36.1 °C)    TempSrc: Oral Oral Oral    SpO2: 96% 97%  "95% 96%   Weight: 64.9 kg (143 lb)      Height: 5' 3" (1.6 m)        Results for orders placed or performed during the hospital encounter of 12/30/19   Blood culture #1   Result Value Ref Range    Blood Culture, Routine No Growth to date    Blood culture #2   Result Value Ref Range    Blood Culture, Routine No Growth to date    CBC auto differential   Result Value Ref Range    WBC 12.37 3.90 - 12.70 K/uL    RBC 3.28 (L) 4.00 - 5.40 M/uL    Hemoglobin 10.7 (L) 12.0 - 16.0 g/dL    Hematocrit 33.0 (L) 37.0 - 48.5 %    Mean Corpuscular Volume 101 (H) 82 - 98 fL    Mean Corpuscular Hemoglobin 32.6 (H) 27.0 - 31.0 pg    Mean Corpuscular Hemoglobin Conc 32.4 32.0 - 36.0 g/dL    RDW 12.1 11.5 - 14.5 %    Platelets 230 150 - 350 K/uL    MPV 9.1 (L) 9.2 - 12.9 fL    Immature Granulocytes 0.4 0.0 - 0.5 %    Gran # (ANC) 9.1 (H) 1.8 - 7.7 K/uL    Immature Grans (Abs) 0.05 (H) 0.00 - 0.04 K/uL    Lymph # 1.8 1.0 - 4.8 K/uL    Mono # 1.2 (H) 0.3 - 1.0 K/uL    Eos # 0.1 0.0 - 0.5 K/uL    Baso # 0.05 0.00 - 0.20 K/uL    nRBC 0 0 /100 WBC    Gran% 73.9 (H) 38.0 - 73.0 %    Lymph% 14.2 (L) 18.0 - 48.0 %    Mono% 10.0 4.0 - 15.0 %    Eosinophil% 1.1 0.0 - 8.0 %    Basophil% 0.4 0.0 - 1.9 %    Differential Method Automated    Comprehensive metabolic panel   Result Value Ref Range    Sodium 140 136 - 145 mmol/L    Potassium 3.7 3.5 - 5.1 mmol/L    Chloride 107 95 - 110 mmol/L    CO2 23 23 - 29 mmol/L    Glucose 111 (H) 70 - 110 mg/dL    BUN, Bld 18 8 - 23 mg/dL    Creatinine 0.9 0.5 - 1.4 mg/dL    Calcium 9.4 8.7 - 10.5 mg/dL    Total Protein 7.0 6.0 - 8.4 g/dL    Albumin 3.5 3.5 - 5.2 g/dL    Total Bilirubin 1.2 (H) 0.1 - 1.0 mg/dL    Alkaline Phosphatase 95 55 - 135 U/L    AST 26 10 - 40 U/L    ALT 24 10 - 44 U/L    Anion Gap 10 8 - 16 mmol/L    eGFR if African American >60.0 >60 mL/min/1.73 m^2    eGFR if non  58.1 (A) >60 mL/min/1.73 m^2   Lactic acid, plasma   Result Value Ref Range    Lactate (Lactic Acid) 1.1 0.5 " "- 2.2 mmol/L   Troponin I   Result Value Ref Range    Troponin I <0.006 0.000 - 0.026 ng/mL   B-Type natriuretic peptide (BNP)   Result Value Ref Range     (H) 0 - 99 pg/mL   Urinalysis, Reflex to Urine Culture Urine, Clean Catch   Result Value Ref Range    Specimen UA Urine, Clean Catch     Color, UA Yellow Yellow, Straw, Kelley    Appearance, UA Clear Clear    pH, UA 5.0 5.0 - 8.0    Specific Gravity, UA 1.020 1.005 - 1.030    Protein, UA Negative Negative    Glucose, UA Negative Negative    Ketones, UA Negative Negative    Bilirubin (UA) Negative Negative    Occult Blood UA Negative Negative    Nitrite, UA Negative Negative    Leukocytes, UA Negative Negative     *Note: Due to a large number of results and/or encounters for the requested time period, some results have not been displayed. A complete set of results can be found in Results Review.     Vitals:    12/30/19 1517 12/30/19 1800 12/30/19 2106 12/30/19 2115   BP: (!) 195/73 (!) 188/84 (!) 162/80    Pulse: 78 75 92 90   Resp: 18 17 18 18   Temp: 99.4 °F (37.4 °C) 99 °F (37.2 °C) 97 °F (36.1 °C)    TempSrc: Oral Oral Oral    SpO2: 96% 97% 95% 96%   Weight: 64.9 kg (143 lb)      Height: 5' 3" (1.6 m)          Physical Exam    Nursing note and vitals reviewed.  Constitutional: She appears well-developed and well-nourished. No distress.   Elderly   HENT:   Head: Normocephalic and atraumatic.   Mouth/Throat: Oropharynx is clear and moist.   Eyes: Conjunctivae and EOM are normal. Pupils are equal, round, and reactive to light.   Neck: Normal range of motion. Neck supple.   Cardiovascular: Normal rate and regular rhythm.   Pulmonary/Chest: Breath sounds normal. No respiratory distress.   Abdominal: Soft. She exhibits no distension. There is no tenderness.   Musculoskeletal: Normal range of motion.   Trace edema bilaterally in lower extremities.    Neurological: She is alert and oriented to person, place, and time. She has normal strength.   Skin: Skin is " warm and dry.   Psychiatric: Her behavior is normal. Thought content normal.         ED Course   Procedures  Labs Reviewed   CBC W/ AUTO DIFFERENTIAL - Abnormal; Notable for the following components:       Result Value    RBC 3.28 (*)     Hemoglobin 10.7 (*)     Hematocrit 33.0 (*)     Mean Corpuscular Volume 101 (*)     Mean Corpuscular Hemoglobin 32.6 (*)     MPV 9.1 (*)     Gran # (ANC) 9.1 (*)     Immature Grans (Abs) 0.05 (*)     Mono # 1.2 (*)     Gran% 73.9 (*)     Lymph% 14.2 (*)     All other components within normal limits    Narrative:     shared lav   COMPREHENSIVE METABOLIC PANEL - Abnormal; Notable for the following components:    Glucose 111 (*)     Total Bilirubin 1.2 (*)     eGFR if non  58.1 (*)     All other components within normal limits    Narrative:     shared lav   B-TYPE NATRIURETIC PEPTIDE - Abnormal; Notable for the following components:     (*)     All other components within normal limits    Narrative:     shared lav   CULTURE, BLOOD   CULTURE, BLOOD   LACTIC ACID, PLASMA    Narrative:     shared lav   TROPONIN I    Narrative:     shared lav   URINALYSIS, REFLEX TO URINE CULTURE    Narrative:     Preferred Collection Type->Urine, Clean Catch        ECG Results          EKG 12-lead  (SOB) (Final result)  Result time 12/31/19 08:49:12    Final result by Interface, Lab In [facility] (12/31/19 08:49:12)                 Narrative:    Test Reason : R06.02,    Vent. Rate : 065 BPM     Atrial Rate : 065 BPM     P-R Int : 180 ms          QRS Dur : 092 ms      QT Int : 428 ms       P-R-T Axes : 047 021 025 degrees     QTc Int : 445 ms    Normal sinus rhythm with sinus arrhythmia  Normal ECG  When compared with ECG of 17-OCT-2019 16:05,  No significant change was found  Confirmed by Ryne Stahl MD ([id number]) on 12/31/2019 8:49:03 AM    Referred By:             Confirmed By:Ryne Stahl MD                            Imaging Results          X-Ray Chest PA And Lateral (Final  result)  Result time 12/30/19 17:12:46    Final result by Sandip Magallon MD (12/30/19 17:12:46)                 Impression:      No acute process.      Electronically signed by: Sandip Magallon MD  Date:    12/30/2019  Time:    17:12             Narrative:    EXAMINATION:  XR CHEST PA AND LATERAL    CLINICAL HISTORY:  Cough;    TECHNIQUE:  PA and lateral views of the chest were performed.    COMPARISON:  12/04/2019.    FINDINGS:  The trachea is unremarkable.  The cardiomediastinal silhouette is within normal limits.  The hilar structures are unremarkable.  The hemidiaphragms are within normal limits.  There is no evidence of free air beneath the hemidiaphragms.  There are no pleural effusions.  There is no evidence of a pneumothorax.  There is no evidence of pneumomediastinum.  No airspace opacity is identified.  There are degenerative changes in the osseous structures.  There are significant costochondral calcifications.                                 Medical Decision Making:   History:   Old Medical Records: I decided to obtain old medical records.  Initial Assessment:   Pt presents with cough for a week.   Independently Interpreted Test(s):   I have ordered and independently interpreted X-rays - see prior notes.  I have ordered and independently interpreted EKG Reading(s) - see prior notes  Clinical Tests:   Lab Tests: Ordered and Reviewed  Radiological Study: Ordered and Reviewed  Medical Tests: Ordered and Reviewed        5:00PM  Dr. Carranza will review pending labs and x-ray.  I anticipate pt will be d/c'd home with symptomatic tx for bronchitis to f/u c PCP in the absence of any concerning lab or x-ray findings.    He pt is comfortable with the tx plan at this time.    Scribe Attestation:   Scribe #1: I performed the above scribed service and the documentation accurately describes the services I performed. I attest to the accuracy of the note.                          Clinical Impression:       ICD-10-CM ICD-9-CM    1. Cough in pediatric patient R05 786.2   2. SOB (shortness of breath) R06.02 786.05         Disposition:   Disposition: Discharged  Condition: Stable                     Haris Sahu MD  12/31/19 1034

## 2019-12-30 NOTE — PROVIDER PROGRESS NOTES - EMERGENCY DEPT.
Encounter Date: 12/30/2019  SCRIBE NOTE: I, Avis Lacey, am scribing for, and in the presence of, Dr. Escudero.    ED Physician Progress Notes         EKG - STEMI Decision  Initial Reading: No STEMI present.    I, Dr. Escudero, personally performed the services described in this documentation. All medical record entries made by the scribe were at my direction and in my presence.  I have reviewed the chart and agree that the record reflects my personal performance and is accurate and complete.

## 2019-12-31 ENCOUNTER — TELEPHONE (OUTPATIENT)
Dept: INTERNAL MEDICINE | Facility: CLINIC | Age: 84
End: 2019-12-31

## 2019-12-31 ENCOUNTER — PES CALL (OUTPATIENT)
Dept: ADMINISTRATIVE | Facility: CLINIC | Age: 84
End: 2019-12-31

## 2019-12-31 ENCOUNTER — NURSE TRIAGE (OUTPATIENT)
Dept: ADMINISTRATIVE | Facility: CLINIC | Age: 84
End: 2019-12-31

## 2019-12-31 NOTE — TELEPHONE ENCOUNTER
Reason for Disposition   Message left on unidentified voice mail.  Phone number verified.    Protocols used: NO CONTACT OR DUPLICATE CONTACT CALL-A-AH    Patient has vaginal irritation from getting diclofenac she was rubbing on the leg. No discharge. Made appt at the [facility] walk in clinic.

## 2019-12-31 NOTE — TELEPHONE ENCOUNTER
Patient still wants to keep her appointment on 1/13/2020  With Dr. Sellers and she is trying to establish with a new PCP just hasn't choosen one yet.

## 2019-12-31 NOTE — CARE UPDATE
Pt was educated about the use of inhaler with the spacer. Pt demonstrated understanding and proper techniques. Chamber and inhaler were given to patient.

## 2019-12-31 NOTE — TELEPHONE ENCOUNTER
This patient was seen again in ED for a persistent cough and markedly elevated blood pressure.  Please call and stress to her again the importance of establishing with a new PCP since I will not be available for her follow up care, thanks

## 2020-01-02 ENCOUNTER — OFFICE VISIT (OUTPATIENT)
Dept: OBSTETRICS AND GYNECOLOGY | Facility: CLINIC | Age: 85
End: 2020-01-02
Payer: MEDICARE

## 2020-01-02 DIAGNOSIS — N76.1 SUBACUTE VAGINITIS: Primary | ICD-10-CM

## 2020-01-02 LAB
CANDIDA RRNA VAG QL PROBE: NEGATIVE
G VAGINALIS RRNA GENITAL QL PROBE: NEGATIVE
T VAGINALIS RRNA GENITAL QL PROBE: NEGATIVE

## 2020-01-02 PROCEDURE — 1126F AMNT PAIN NOTED NONE PRSNT: CPT | Mod: ,,, | Performed by: NURSE PRACTITIONER

## 2020-01-02 PROCEDURE — 99213 OFFICE O/P EST LOW 20 MIN: CPT | Mod: S$PBB,,, | Performed by: NURSE PRACTITIONER

## 2020-01-02 PROCEDURE — 1159F MED LIST DOCD IN RCRD: CPT | Mod: ,,, | Performed by: NURSE PRACTITIONER

## 2020-01-02 PROCEDURE — 1159F PR MEDICATION LIST DOCUMENTED IN MEDICAL RECORD: ICD-10-PCS | Mod: ,,, | Performed by: NURSE PRACTITIONER

## 2020-01-02 PROCEDURE — 1126F PR PAIN SEVERITY QUANTIFIED, NO PAIN PRESENT: ICD-10-PCS | Mod: ,,, | Performed by: NURSE PRACTITIONER

## 2020-01-02 PROCEDURE — 99999 PR PBB SHADOW E&M-EST. PATIENT-LVL III: CPT | Mod: PBBFAC,,, | Performed by: NURSE PRACTITIONER

## 2020-01-02 PROCEDURE — 87480 CANDIDA DNA DIR PROBE: CPT

## 2020-01-02 PROCEDURE — 87510 GARDNER VAG DNA DIR PROBE: CPT

## 2020-01-02 PROCEDURE — 99213 OFFICE O/P EST LOW 20 MIN: CPT | Mod: PBBFAC | Performed by: NURSE PRACTITIONER

## 2020-01-02 PROCEDURE — 99999 PR PBB SHADOW E&M-EST. PATIENT-LVL III: ICD-10-PCS | Mod: PBBFAC,,, | Performed by: NURSE PRACTITIONER

## 2020-01-02 PROCEDURE — 99213 PR OFFICE/OUTPT VISIT, EST, LEVL III, 20-29 MIN: ICD-10-PCS | Mod: S$PBB,,, | Performed by: NURSE PRACTITIONER

## 2020-01-03 ENCOUNTER — PATIENT OUTREACH (OUTPATIENT)
Dept: OTHER | Facility: OTHER | Age: 85
End: 2020-01-03

## 2020-01-03 ENCOUNTER — TELEPHONE (OUTPATIENT)
Dept: OBSTETRICS AND GYNECOLOGY | Facility: CLINIC | Age: 85
End: 2020-01-03

## 2020-01-03 NOTE — PROGRESS NOTES
"Digital Medicine: Clinician Follow-Up    Gerda Lai submitted a reading of 192/75 at 1/3/2020  2:54 PM. Called patient for hypertension follow-up. She denies any s/sx with elevated reading - no CP, SOB, headache or blurred vision. "I have no symptoms at all, I feel really fine." Patient reports that diclofenac use has been excessive and she thinks it may be causing her blood pressure to be higher. "I have been bathing in this stuff." She plans to reduce her usage. She reports that her blood pressure device was recently charged.    The history is provided by the patient. No  was used.     Follow Up  Follow-up reason(s): reading review and routine education      Alert received.   Care Team received high BP alert.  Patient is not experiencing symptoms.  Reading was invalid because Patient reports that she has been "bathing in this stuff," referring to diclofenac use which can increase the blood pressure      INTERVENTION(S)  encouragement/support    PLAN  patient verbalizes understanding, patient amenable to changes and continue monitoring    Current 30-day BP avg of 197/80 is uncontrolled, does not meet goal of <150/90.  Reviewed readings: DBP remains consistently controlled but SBP is still elevated and trending upwards. Only 7% of her readings have met goal. She has recorded elevated readings regularly since stopping the CCB at the beginning of December.  Reviewed information we discussed about diclofenac back in October 2019 - ADR of increasing BP. Patient did not recall these conversations. She plans to scale back on diclofenac's use.    Patient still has remaining quantity of nifedipine 90 mg which was discontinued in December due to complaints of ADRs. Instructed patient to take one nifedipine 90 mg tablet to reduce elevated BP. She is to remeasure BP in 2 hours.  Patient was instructed to call 911 or [facility] on Call if she has a medical emergency over the weekend due to elevated " reading.     Continue current regimen.    Follow-up on Monday.      There are no preventive care reminders to display for this patient.    Last 5 Patient Entered Readings                                      Current 30 Day Average: 197/80     Recent Readings 1/3/2020 1/3/2020 12/31/2019 12/31/2019 12/23/2019    SBP (mmHg) 192 192 204 204 197    DBP (mmHg) 75 75 86 86 85    Pulse 69 69 76 76 55             Hypertension Medications             labetalol (NORMODYNE) 100 MG tablet Take 2.5 tablets (250 mg total) by mouth every 12 (twelve) hours.    TEKTURNA 300 mg Tab TAKE 1 TABLET (300 MG TOTAL) BY MOUTH ONCE DAILY.            Patient asked: I was reading in the paperwork that a side effect of diclofenac is that it can increase your blood pressure. Is that true? I think I've been using way too much of it. I've been bathing in that stuff.   Reminded patient of conversations from October 2019 when we discussed one of the ADRs of diclofenac being increasing the BP. She tried Aspercreme and Biofreeze which she reports did not work as well so she resumed using diclofenac.      Patient asked:     Patient asked:       Screenings

## 2020-01-03 NOTE — TELEPHONE ENCOUNTER
----- Message from Kika Scott sent at 1/3/2020  2:27 PM CST -----  Contact: JOSÉ MIGUEL CASTILLO [[id number]]  Name of Who is Calling : JOSÉ MIGUEL CASTILLO [[id number]]    Patient is requesting a call from staff in regards to results from procedure   .....Please contact to further discuss and advise.    Can the clinic reply by [facility] : No    What Number to Call Back :  [phone]

## 2020-01-04 LAB
BACTERIA BLD CULT: NORMAL
BACTERIA BLD CULT: NORMAL

## 2020-01-07 ENCOUNTER — OFFICE VISIT (OUTPATIENT)
Dept: OBSTETRICS AND GYNECOLOGY | Facility: CLINIC | Age: 85
End: 2020-01-07
Payer: MEDICARE

## 2020-01-07 ENCOUNTER — PATIENT OUTREACH (OUTPATIENT)
Dept: ADMINISTRATIVE | Facility: OTHER | Age: 85
End: 2020-01-07

## 2020-01-07 VITALS
DIASTOLIC BLOOD PRESSURE: 59 MMHG | HEIGHT: 63 IN | BODY MASS INDEX: 24.54 KG/M2 | HEART RATE: 76 BPM | WEIGHT: 138.5 LBS | SYSTOLIC BLOOD PRESSURE: 137 MMHG

## 2020-01-07 DIAGNOSIS — N89.8 VAGINAL IRRITATION: Primary | ICD-10-CM

## 2020-01-07 DIAGNOSIS — Z85.3 PERSONAL HISTORY OF BREAST CANCER: ICD-10-CM

## 2020-01-07 DIAGNOSIS — N95.9 MENOPAUSAL AND PERIMENOPAUSAL DISORDER: ICD-10-CM

## 2020-01-07 PROCEDURE — 99999 PR PBB SHADOW E&M-EST. PATIENT-LVL III: ICD-10-PCS | Mod: PBBFAC,,, | Performed by: OBSTETRICS & GYNECOLOGY

## 2020-01-07 PROCEDURE — 87661 TRICHOMONAS VAGINALIS AMPLIF: CPT

## 2020-01-07 PROCEDURE — G0101 PR CA SCREEN;PELVIC/BREAST EXAM: ICD-10-PCS | Mod: S$PBB,,, | Performed by: OBSTETRICS & GYNECOLOGY

## 2020-01-07 PROCEDURE — G0101 CA SCREEN;PELVIC/BREAST EXAM: HCPCS | Mod: S$PBB,,, | Performed by: OBSTETRICS & GYNECOLOGY

## 2020-01-07 PROCEDURE — 87481 CANDIDA DNA AMP PROBE: CPT | Mod: 59

## 2020-01-07 PROCEDURE — 99999 PR PBB SHADOW E&M-EST. PATIENT-LVL III: CPT | Mod: PBBFAC,,, | Performed by: OBSTETRICS & GYNECOLOGY

## 2020-01-07 PROCEDURE — 99213 OFFICE O/P EST LOW 20 MIN: CPT | Mod: PBBFAC | Performed by: OBSTETRICS & GYNECOLOGY

## 2020-01-07 PROCEDURE — G0101 CA SCREEN;PELVIC/BREAST EXAM: HCPCS | Mod: PBBFAC | Performed by: OBSTETRICS & GYNECOLOGY

## 2020-01-07 RX ORDER — BETAMETHASONE VALERATE 1.2 MG/G
OINTMENT TOPICAL 2 TIMES DAILY
Qty: 45 G | Refills: 1 | Status: SHIPPED | OUTPATIENT
Start: 2020-01-07 | End: 2020-03-19

## 2020-01-07 NOTE — PROGRESS NOTES
HISTORY OF PRESENT ILLNESS:    Gerda Lai is a 86 y.o. female , presents for PERSISTENT VAGINAL IRRITATION.  SEEN BY NP 2020 AND HAD NEGATIVE AFFIRM, BUT NOTE FROM THAT VISIT NOT LOCATED.  HAS A HISTORY OF BREAST CA AND NO ERT IN THE PAST.  CONCERN RE LOCAL CONTACT WITH TOPICAL MEDICATION CAUSING IRRITATION.  ADVISED RE TOPICAL ALLERGENS.  NO EVIDENT INFECTION, TOPICAL VALISONE CREAM AND ADVISED LOCAL CARE  MAMMO IS FOLLOWED BY ONCOLOGY, UP TO DATE      Past Medical History:   Diagnosis Date    Arthritis     Basal cell carcinoma 2016    right post auricular neck     Breast cancer     right    Cataract     Fibromyalgia     History of measles as a child     Hyperlipidemia     Hypertension     Personal history of colonic polyps     Pneumonia     SCC (squamous cell carcinoma)     R chest    SCC (squamous cell carcinoma)     left medial shoulder    SCC (squamous cell carcinoma)     left knee    Shingles     Squamous cell carcinoma     right forearm    Thyroid disease     Vaginitis        Past Surgical History:   Procedure Laterality Date    ADENOIDECTOMY      BREAST BIOPSY Left     Excisional bx, benign    BREAST LUMPECTOMY Right     DCIS    CATARACT EXTRACTION W/  INTRAOCULAR LENS IMPLANT Bilateral     EYE SURGERY      JOINT REPLACEMENT      thyriodectomy      partial    tonsillectomy      TONSILLECTOMY      TOTAL HIP ARTHROPLASTY      right    Yag  Left         MEDICATIONS AND ALLERGIES:      Current Outpatient Medications:     acetaminophen (TYLENOL) 650 MG TbSR, Take 650 mg by mouth as needed (pain). , Disp: , Rfl:     albuterol (PROVENTIL/VENTOLIN HFA) 90 mcg/actuation inhaler, Inhale 2 puffs into the lungs every 6 (six) hours as needed for Wheezing. Rescue, Disp: 1 Inhaler, Rfl: 0    alendronate (FOSAMAX) 35 MG tablet, Take 1 tablet (35 mg total) by mouth every 7 days., Disp: 4 tablet, Rfl: 11    B INFANTIS/B ANI/B JOSE/B BIFID (PROBIOTIC 4X  ORAL), Take 1 tablet by mouth daily, Disp: , Rfl:     coenzyme Q10 (CO Q-10) 100 mg capsule, Take 100 mg by mouth once daily., Disp: , Rfl:     diclofenac sodium (VOLTAREN) 1 % Gel, APPLY TO AFFECTED AREA TWICE A DAY, Disp: 100 g, Rfl: 6    glucosamine-chondroitin 500-400 mg tablet, Take 1 tablet by mouth once daily. , Disp: , Rfl:     ipratropium (ATROVENT) 0.03 % nasal spray, 2 sprays by Nasal route 2 (two) times daily., Disp: , Rfl:     labetalol (NORMODYNE) 100 MG tablet, Take 2.5 tablets (250 mg total) by mouth every 12 (twelve) hours., Disp: 180 tablet, Rfl: 3    LORazepam (ATIVAN) 0.5 MG tablet, TAKE 1/2 TABLET BY MOUTH TWICE A DAY AS NEEDED FOR SEVERE ANXIETY, Disp: 30 tablet, Rfl: 0    multivitamin capsule, Take 1 capsule by mouth once daily., Disp: , Rfl:     omeprazole (PRILOSEC OTC) 20 MG tablet, Take 20 mg by mouth once daily.  , Disp: , Rfl:     polymyxin B sulf-trimethoprim (POLYTRIM) 10,000 unit- 1 mg/mL Drop, Place 1 drop into both eyes every 6 (six) hours., Disp: 10 mL, Rfl: 0    pravastatin (PRAVACHOL) 40 MG tablet, TAKE 1 TABLET (40 MG TOTAL) BY MOUTH ONCE DAILY., Disp: 90 tablet, Rfl: 3    TEKTURNA 300 mg Tab, TAKE 1 TABLET (300 MG TOTAL) BY MOUTH ONCE DAILY., Disp: 90 tablet, Rfl: 2    TURMERIC ORAL, Take 500 mg by mouth 2 (two) times daily. , Disp: , Rfl:     betamethasone valerate 0.1% (VALISONE) 0.1 % Oint, Apply topically 2 (two) times daily. for 10 days, Disp: 45 g, Rfl: 1    Review of patient's allergies indicates:   Allergen Reactions    Sulfa (sulfonamide antibiotics) Rash    Clarithromycin Other (See Comments)     Weak, extreme fatigue. dizziness    Flexeril [cyclobenzaprine] Other (See Comments)     Dizziness    Iodine and iodide containing products     Lisinopril Other (See Comments)     Cough and sensation of throat swelling/?angioedema    Losartan Rash    Metoprolol Swelling     Tightness in throat    Tramadol Other (See Comments)     Dizzy and weak     Verapamil (bulk) Palpitations    Voltaren [diclofenac sodium] Other (See Comments)     Drops blood pressure       Family History   Problem Relation Age of Onset    Breast cancer Mother     Cancer Mother     Stroke Paternal Grandfather     Heart disease Father     Cancer Paternal Aunt     Heart disease Paternal Aunt     Glaucoma Paternal Grandmother     Amblyopia Neg Hx     Blindness Neg Hx     Cataracts Neg Hx     Diabetes Neg Hx     Hypertension Neg Hx     Macular degeneration Neg Hx     Retinal detachment Neg Hx     Strabismus Neg Hx     Thyroid disease Neg Hx     Melanoma Neg Hx        Social History     Socioeconomic History    Marital status: Single     Spouse name: Not on file    Number of children: Not on file    Years of education: Not on file    Highest education level: Not on file   Occupational History     Employer: RETIRED   Social Needs    Financial resource strain: Not on file    Food insecurity:     Worry: Not on file     Inability: Not on file    Transportation needs:     Medical: Not on file     Non-medical: Not on file   Tobacco Use    Smoking status: Never Smoker    Smokeless tobacco: Never Used   Substance and Sexual Activity    Alcohol use: Yes     Comment: socially - celebrations only    Drug use: No    Sexual activity: Never   Lifestyle    Physical activity:     Days per week: Not on file     Minutes per session: Not on file    Stress: Not on file   Relationships    Social connections:     Talks on phone: Not on file     Gets together: Not on file     Attends Yarsani service: Not on file     Active member of club or organization: Not on file     Attends meetings of clubs or organizations: Not on file     Relationship status: Not on file   Other Topics Concern    Are you pregnant or think you may be? Not Asked    Breast-feeding Not Asked   Social History Narrative    Not on file       COMPREHENSIVE GYN HISTORY:  PAP History:  Denies abnormal Paps except a  "noted above.  Infection History: Denies STDs. Denies PID.  Benign History: Denies uterine fibroids. Denies ovarian cysts. Denies endometriosis.  Denies other conditions.  Cancer History: Denies cervical cancer. Denies uterine cancer or hyperplasia. Denies ovarian cancer. Denies vulvar cancer or pre-cancer. Denies vaginal cancer or pre-cancer. Denies breast cancer. Denies colon cancer.    ROS:  GENERAL: No weight changes. No swelling. No fatigue. No fever.  CARDIOVASCULAR: No chest pain. No shortness of breath. No leg cramps.   NEUROLOGICAL: No headaches. No vision changes.  BREASTS: No pain. No lumps. No discharge.  ABDOMEN: No pain. No nausea. No vomiting. No diarrhea. No constipation.  REPRODUCTIVE: No abnormal bleeding.   VULVA: No pain. No lesions. No itching.  VAGINA: No relaxation. No itching. No odor. No discharge. No lesions.  URINARY: No incontinence. No nocturia. No frequency. No dysuria.    BP (!) 137/59 (BP Location: Right arm, Patient Position: Sitting)   Pulse 76   Ht 5' 3" (1.6 m)   Wt 62.8 kg (138 lb 8 oz)   BMI 24.53 kg/m²     PE:  APPEARANCE: Well nourished, well developed, in no acute distress.  AFFECT: WNL, alert and oriented x 3.  CHEST: Good respiratory effort.   ABDOMEN: Soft. No tenderness or masses. No hepatosplenomegaly. No hernias.  BREASTS: DEFERRED.  PELVIC: ATROPHIC EXTERNAL FEMALE GENITALIA without lesions. Normal hair distribution. Adequate perineal body, normal urethral meatus. VAGINA DRY without lesions or discharge. CERVIX STENOTIC without lesions, discharge or tenderness. No significant cystocele or rectocele. Bimanual exam shows uterus to be normal size, regular, mobile and nontender. Adnexa without masses or tenderness.  EXTREMITIES: No edema.    PROCEDURES:      DIAGNOSIS:  1. Vaginal irritation     2. Menopausal and perimenopausal disorder     3. Personal history of breast cancer           COUNSELING:  The patient was counseled today on osteoporosis prevention, calcium " supplementation, and regular weight bearing exercise. The patient was also counseled today on ACS PAP guidelines, with recommendations for yearly pelvic exams unless their uterus, cervix, and ovaries were removed for benign reasons; in that case, examinations every 3-5 years are recommended.  The patient was also counseled regarding monthly breast self-examination, routine STD screening for at-risk populations, prophylactic immunizations for transmitted infections such as  HPV, Pertussis, or Influenza as appropriate, and yearly mammograms when indicated by ACS guidelines.  She was advised to see her primary care physician for all other health maintenance.    FOLLOW-UP with me annually.

## 2020-01-08 LAB
BACTERIAL VAGINOSIS DNA: NEGATIVE
CANDIDA GLABRATA DNA: NEGATIVE
CANDIDA KRUSEI DNA: NEGATIVE
CANDIDA RRNA VAG QL PROBE: NEGATIVE
T VAGINALIS RRNA GENITAL QL PROBE: NEGATIVE

## 2020-01-09 ENCOUNTER — LAB VISIT (OUTPATIENT)
Dept: LAB | Facility: HOSPITAL | Age: 85
End: 2020-01-09
Attending: FAMILY MEDICINE
Payer: MEDICARE

## 2020-01-09 ENCOUNTER — PATIENT OUTREACH (OUTPATIENT)
Dept: OTHER | Facility: OTHER | Age: 85
End: 2020-01-09

## 2020-01-09 DIAGNOSIS — M12.811 ROTATOR CUFF TEAR ARTHROPATHY OF BOTH SHOULDERS: Primary | ICD-10-CM

## 2020-01-09 DIAGNOSIS — M75.102 ROTATOR CUFF TEAR ARTHROPATHY OF BOTH SHOULDERS: Primary | ICD-10-CM

## 2020-01-09 DIAGNOSIS — M47.812 CERVICAL SPONDYLOSIS: ICD-10-CM

## 2020-01-09 DIAGNOSIS — I10 HYPERTENSION, ESSENTIAL: ICD-10-CM

## 2020-01-09 DIAGNOSIS — M47.816 LUMBAR SPONDYLOSIS: ICD-10-CM

## 2020-01-09 DIAGNOSIS — M75.101 ROTATOR CUFF TEAR ARTHROPATHY OF BOTH SHOULDERS: Primary | ICD-10-CM

## 2020-01-09 DIAGNOSIS — E78.00 PURE HYPERCHOLESTEROLEMIA: ICD-10-CM

## 2020-01-09 DIAGNOSIS — M12.812 ROTATOR CUFF TEAR ARTHROPATHY OF BOTH SHOULDERS: Primary | ICD-10-CM

## 2020-01-09 DIAGNOSIS — M17.0 OSTEOARTHRITIS OF BOTH KNEES, UNSPECIFIED OSTEOARTHRITIS TYPE: ICD-10-CM

## 2020-01-09 LAB
ALBUMIN SERPL BCP-MCNC: 3.5 G/DL (ref 3.5–5.2)
ALP SERPL-CCNC: 89 U/L (ref 55–135)
ALT SERPL W/O P-5'-P-CCNC: 14 U/L (ref 10–44)
ANION GAP SERPL CALC-SCNC: 11 MMOL/L (ref 8–16)
AST SERPL-CCNC: 19 U/L (ref 10–40)
BASOPHILS # BLD AUTO: 0.05 K/UL (ref 0–0.2)
BASOPHILS NFR BLD: 0.6 % (ref 0–1.9)
BILIRUB SERPL-MCNC: 0.4 MG/DL (ref 0.1–1)
BUN SERPL-MCNC: 22 MG/DL (ref 8–23)
CALCIUM SERPL-MCNC: 9.2 MG/DL (ref 8.7–10.5)
CHLORIDE SERPL-SCNC: 107 MMOL/L (ref 95–110)
CHOLEST SERPL-MCNC: 149 MG/DL (ref 120–199)
CHOLEST/HDLC SERPL: 3.2 {RATIO} (ref 2–5)
CO2 SERPL-SCNC: 23 MMOL/L (ref 23–29)
CREAT SERPL-MCNC: 0.9 MG/DL (ref 0.5–1.4)
DIFFERENTIAL METHOD: ABNORMAL
EOSINOPHIL # BLD AUTO: 0.2 K/UL (ref 0–0.5)
EOSINOPHIL NFR BLD: 2 % (ref 0–8)
ERYTHROCYTE [DISTWIDTH] IN BLOOD BY AUTOMATED COUNT: 12.4 % (ref 11.5–14.5)
EST. GFR  (AFRICAN AMERICAN): >60 ML/MIN/1.73 M^2
EST. GFR  (NON AFRICAN AMERICAN): 58.1 ML/MIN/1.73 M^2
GLUCOSE SERPL-MCNC: 94 MG/DL (ref 70–110)
HCT VFR BLD AUTO: 31.7 % (ref 37–48.5)
HDLC SERPL-MCNC: 46 MG/DL (ref 40–75)
HDLC SERPL: 30.9 % (ref 20–50)
HGB BLD-MCNC: 10 G/DL (ref 12–16)
IMM GRANULOCYTES # BLD AUTO: 0.05 K/UL (ref 0–0.04)
IMM GRANULOCYTES NFR BLD AUTO: 0.6 % (ref 0–0.5)
LDLC SERPL CALC-MCNC: 76.2 MG/DL (ref 63–159)
LYMPHOCYTES # BLD AUTO: 1.7 K/UL (ref 1–4.8)
LYMPHOCYTES NFR BLD: 18.6 % (ref 18–48)
MCH RBC QN AUTO: 31.3 PG (ref 27–31)
MCHC RBC AUTO-ENTMCNC: 31.5 G/DL (ref 32–36)
MCV RBC AUTO: 99 FL (ref 82–98)
MONOCYTES # BLD AUTO: 0.9 K/UL (ref 0.3–1)
MONOCYTES NFR BLD: 9.7 % (ref 4–15)
NEUTROPHILS # BLD AUTO: 6.1 K/UL (ref 1.8–7.7)
NEUTROPHILS NFR BLD: 68.5 % (ref 38–73)
NONHDLC SERPL-MCNC: 103 MG/DL
NRBC BLD-RTO: 0 /100 WBC
PLATELET # BLD AUTO: 334 K/UL (ref 150–350)
PMV BLD AUTO: 9.1 FL (ref 9.2–12.9)
POTASSIUM SERPL-SCNC: 4.3 MMOL/L (ref 3.5–5.1)
PROT SERPL-MCNC: 6.8 G/DL (ref 6–8.4)
RBC # BLD AUTO: 3.19 M/UL (ref 4–5.4)
SODIUM SERPL-SCNC: 141 MMOL/L (ref 136–145)
TRIGL SERPL-MCNC: 134 MG/DL (ref 30–150)
TSH SERPL DL<=0.005 MIU/L-ACNC: 0.98 UIU/ML (ref 0.4–4)
WBC # BLD AUTO: 8.87 K/UL (ref 3.9–12.7)

## 2020-01-09 PROCEDURE — 84443 ASSAY THYROID STIM HORMONE: CPT

## 2020-01-09 PROCEDURE — 80061 LIPID PANEL: CPT

## 2020-01-09 PROCEDURE — 85025 COMPLETE CBC W/AUTO DIFF WBC: CPT

## 2020-01-09 PROCEDURE — 80053 COMPREHEN METABOLIC PANEL: CPT

## 2020-01-09 PROCEDURE — 36415 COLL VENOUS BLD VENIPUNCTURE: CPT | Mod: PO

## 2020-01-09 NOTE — PROGRESS NOTES
Digital Medicine: Clinician Follow-Up    Called for hypertension follow-up. Patient expressed concerns about elevated blood pressure reading of 238/96 recorded on 1/3/2020. She attributes her elevated blood pressure readings to excessive use of diclofenac cream which she states she has scaled back on her use. Blood pressure has not been measured in a week. She plans to measure her blood pressure later today. Patient also feels that blood pressure has been better in doctor office visit.    The history is provided by the patient. No  was used.     Follow Up  Follow-up reason(s): reading review and routine education          INTERVENTION(S)  encouragement/support    PLAN  patient verbalizes understanding and continue monitoring    Current 30-day BP avg of 209/84 is uncontrolled, does not meet goal of <150/90.  Reviewed readings: DBP is consistently controlled; SBP is consistently elevated. No readings recorded since 1/3/2020. On 1/7/2020, Office visit /59.  Counseled patient again on diclofenac and ADR of increasing BP.     Continue current regimen.  Requested patient take device to office visit on 1/13/2020 for BP comparison. If reading difference is significant, consider exchanging for new device at [facility].    Follow-up in one week.      There are no preventive care reminders to display for this patient.    Last 5 Patient Entered Readings                                      Current 30 Day Average: 209/84     Recent Readings 1/3/2020 1/3/2020 1/3/2020 1/3/2020 12/31/2019    SBP (mmHg) 238 238 192 192 204    DBP (mmHg) 96 96 75 75 86    Pulse 81 81 69 69 76             Hypertension Medications             labetalol (NORMODYNE) 100 MG tablet Take 2.5 tablets (250 mg total) by mouth every 12 (twelve) hours.    TEKTURNA 300 mg Tab TAKE 1 TABLET (300 MG TOTAL) BY MOUTH ONCE DAILY.                 Screenings

## 2020-01-10 ENCOUNTER — PATIENT OUTREACH (OUTPATIENT)
Dept: OTHER | Facility: OTHER | Age: 85
End: 2020-01-10

## 2020-01-10 DIAGNOSIS — I10 HYPERTENSION, ESSENTIAL: ICD-10-CM

## 2020-01-10 RX ORDER — FELODIPINE 2.5 MG/1
2.5 TABLET, EXTENDED RELEASE ORAL DAILY
Qty: 30 TABLET | Refills: 1
Start: 2020-01-10 | End: 2020-01-23

## 2020-01-10 NOTE — PROGRESS NOTES
Digital Medicine: Clinician Follow-Up    Gerda Lai submitted a reading of 238/83 at 1/9/2020  3:20 PM. She denies having any s/sx of hypertension - no CP, SOB, headache or blurred vision. She believes the reading as valid as she follow-up by measuring her blood pressure with a different blood pressure monitor at home that also registered a high reading. She admits to having pain in her knees, shoulders and neck which may be contributing to her blood pressure readings. Rehab for pain management is scheduled to restart on 1/23/2020.           Intervention/Plan    There are no preventive care reminders to display for this patient.    Last 5 Patient Entered Readings                                      Current 30 Day Average: 213/85     Recent Readings 1/9/2020 1/9/2020 1/9/2020 1/9/2020 1/3/2020    SBP (mmHg) 238 238 239 239 238    DBP (mmHg) 83 83 99 99 96    Pulse 61 61 62 62 81             Hypertension Medications             labetalol (NORMODYNE) 100 MG tablet Take 2.5 tablets (250 mg total) by mouth every 12 (twelve) hours.    TEKTURNA 300 mg Tab TAKE 1 TABLET (300 MG TOTAL) BY MOUTH ONCE DAILY.                 Screenings

## 2020-01-10 NOTE — PROGRESS NOTES
Digital Medicine: Clinician Follow-Up    Gerda Lai submitted a reading of 238/83 at 1/9/2020  3:20 PM. She denies having any s/sx of hypertension - no CP, SOB, headache or blurred vision. She believes the reading as valid as she follow-up by measuring her blood pressure with a different blood pressure monitor at home that also registered a high reading. She admits to having pain in her knees, shoulders and neck which may be contributing to her blood pressure readings. Rehab for pain management is scheduled to restart on 1/23/2020. She also states that she feels that she has anxiety over her blood pressure readings.      The history is provided by the patient. No  was used.     Follow Up  Follow-up reason(s): reading review and routine education      Alert received.   Care Team received high BP alert.  Patient is not experiencing symptoms.  Medication Change: new med        INTERVENTION(S)  reviewed appropriate dose schedule, recommended med change and encouragement/support    PLAN  patient verbalizes understanding, patient amenable to changes and continue monitoring    Ms. Lorenz's BP continues to be extremely elevated without a CCB included in her regimen. Complaint of ADRs or allergies to other drug classes continues to make treating her a challenge for BP control. We discussed concerns with her current BP readings and both agreed that these readings were unacceptable. Also discussed with patient that current regimen was not managing BP as needed and that I still feel she needs a CCB on board. We discussed past drug class use and issues with their treatment. We revisited the idea of using a CCB due to its good performance on her BP but noted concerns of side effects. She mentioned felodipine which was suggested but never tried due to lack of insurance coverage at the time. Ms. Lorenz worked with her insurance to obtain approval for felodipine and has it in her possession at home. She  agreed to try felodipine 2.5 mg.    Start felodipine 2.5 mg every afternoon for 30-day trial period to evaluate her response for ADRs and BP improvement.  Continue labetalol and Tekturna as prescribed.  Continue measuring BP regularly for monitoring.   Request [facility] follow-up on ways to reduce anxiety with BP measurements.     Follow-up in 2 weeks.       There are no preventive care reminders to display for this patient.    Last 5 Patient Entered Readings                                      Current 30 Day Average: 213/85     Recent Readings 1/9/2020 1/9/2020 1/9/2020 1/9/2020 1/3/2020    SBP (mmHg) 238 238 239 239 238    DBP (mmHg) 83 83 99 99 96    Pulse 61 61 62 62 81           Hypertension Medications             felodipine (PLENDIL) 2.5 MG Tb24 Take 1 tablet (2.5 mg total) by mouth once daily. Take in the afternoon.    labetalol (NORMODYNE) 100 MG tablet Take 2.5 tablets (250 mg total) by mouth every 12 (twelve) hours.    TEKTURNA 300 mg Tab TAKE 1 TABLET (300 MG TOTAL) BY MOUTH ONCE DAILY.               Screenings

## 2020-01-13 VITALS
DIASTOLIC BLOOD PRESSURE: 60 MMHG | WEIGHT: 139.13 LBS | BODY MASS INDEX: 24.65 KG/M2 | HEIGHT: 63 IN | SYSTOLIC BLOOD PRESSURE: 132 MMHG

## 2020-01-13 NOTE — PROGRESS NOTES
Chief Complaint   Patient presents with    vaginal irritation       History of Present Illness: Gerda Lai is a 86 y.o. female that presents today 1/2/2020  [facility] c/o vaginal irritation and discharge x 2 weeks. She denies any vaginal burning or odor. She has not tried any OTC medications. She denies the use of scented prodcucts in the vaginal area or changing any detergents or hygiene products. She does report that she uses a lot of diclofenac cream for her arthritis and when is think some got in the vaginal area when she was bathing. No other complaints or concerns noted.       Past Medical History:   Diagnosis Date    Arthritis     Basal cell carcinoma 09/2016    right post auricular neck     Breast cancer 1992    right    Cataract     Fibromyalgia     History of measles as a child     Hyperlipidemia     Hypertension     Personal history of colonic polyps     Pneumonia     SCC (squamous cell carcinoma) 2015    R chest    SCC (squamous cell carcinoma) 2016    left medial shoulder    SCC (squamous cell carcinoma) 2017    left knee    Shingles     Squamous cell carcinoma 2015    right forearm    Thyroid disease     Vaginitis        Past Surgical History:   Procedure Laterality Date    ADENOIDECTOMY      BREAST BIOPSY Left     Excisional bx, benign    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    CATARACT EXTRACTION W/  INTRAOCULAR LENS IMPLANT Bilateral     EYE SURGERY      JOINT REPLACEMENT      thyriodectomy      partial    tonsillectomy      TONSILLECTOMY      TOTAL HIP ARTHROPLASTY      right    Yag  Left        Current Outpatient Medications   Medication Sig Dispense Refill    acetaminophen (TYLENOL) 650 MG TbSR Take 650 mg by mouth as needed (pain).       albuterol (PROVENTIL/VENTOLIN HFA) 90 mcg/actuation inhaler Inhale 2 puffs into the lungs every 6 (six) hours as needed for Wheezing. Rescue 1 Inhaler 0    alendronate (FOSAMAX) 35 MG tablet  Take 1 tablet (35 mg total) by mouth every 7 days. 4 tablet 11    B INFANTIS/B ANI/B JOSE/B BIFID (PROBIOTIC 4X ORAL) Take 1 tablet by mouth daily      coenzyme Q10 (CO Q-10) 100 mg capsule Take 100 mg by mouth once daily.      diclofenac sodium (VOLTAREN) 1 % Gel APPLY TO AFFECTED AREA TWICE A  g 6    glucosamine-chondroitin 500-400 mg tablet Take 1 tablet by mouth once daily.       ipratropium (ATROVENT) 0.03 % nasal spray 2 sprays by Nasal route 2 (two) times daily.      labetalol (NORMODYNE) 100 MG tablet Take 2.5 tablets (250 mg total) by mouth every 12 (twelve) hours. 180 tablet 3    LORazepam (ATIVAN) 0.5 MG tablet TAKE 1/2 TABLET BY MOUTH TWICE A DAY AS NEEDED FOR SEVERE ANXIETY 30 tablet 0    multivitamin capsule Take 1 capsule by mouth once daily.      omeprazole (PRILOSEC OTC) 20 MG tablet Take 20 mg by mouth once daily.        polymyxin B sulf-trimethoprim (POLYTRIM) 10,000 unit- 1 mg/mL Drop Place 1 drop into both eyes every 6 (six) hours. 10 mL 0    pravastatin (PRAVACHOL) 40 MG tablet TAKE 1 TABLET (40 MG TOTAL) BY MOUTH ONCE DAILY. 90 tablet 3    TEKTURNA 300 mg Tab TAKE 1 TABLET (300 MG TOTAL) BY MOUTH ONCE DAILY. 90 tablet 2    TURMERIC ORAL Take 500 mg by mouth 2 (two) times daily.       betamethasone valerate 0.1% (VALISONE) 0.1 % Oint Apply topically 2 (two) times daily. for 10 days 45 g 1    felodipine (PLENDIL) 2.5 MG Tb24 Take 1 tablet (2.5 mg total) by mouth once daily. Take in the afternoon. 30 tablet 1     No current facility-administered medications for this visit.        Review of patient's allergies indicates:   Allergen Reactions    Sulfa (sulfonamide antibiotics) Rash    Clarithromycin Other (See Comments)     Weak, extreme fatigue. dizziness    Flexeril [cyclobenzaprine] Other (See Comments)     Dizziness    Iodine and iodide containing products     Lisinopril Other (See Comments)     Cough and sensation of throat swelling/?angioedema    Losartan Rash     "Metoprolol Swelling     Tightness in throat    Tramadol Other (See Comments)     Dizzy and weak    Verapamil (bulk) Palpitations    Voltaren [diclofenac sodium] Other (See Comments)     Drops blood pressure       Family History   Problem Relation Age of Onset    Breast cancer Mother     Cancer Mother     Stroke Paternal Grandfather     Heart disease Father     Cancer Paternal Aunt     Heart disease Paternal Aunt     Glaucoma Paternal Grandmother     Amblyopia Neg Hx     Blindness Neg Hx     Cataracts Neg Hx     Diabetes Neg Hx     Hypertension Neg Hx     Macular degeneration Neg Hx     Retinal detachment Neg Hx     Strabismus Neg Hx     Thyroid disease Neg Hx     Melanoma Neg Hx        Social History     Tobacco Use    Smoking status: Never Smoker    Smokeless tobacco: Never Used   Substance Use Topics    Alcohol use: Yes     Comment: socially - celebrations only    Drug use: No       OB History    Para Term  AB Living   0 0 0 0 0 0   SAB TAB Ectopic Multiple Live Births   0 0 0 0 0       Review of Symptoms:  GENERAL: Denies weight gain or weight loss. Feeling well overall.   SKIN: Denies rash or lesions.   HEAD: Denies head injury or headache.   NODES: Denies enlarged lymph nodes.   CHEST: Denies chest pain or shortness of breath.   CARDIOVASCULAR: Denies palpitations or left sided chest pain.   ABDOMEN: No abdominal pain, constipation, diarrhea, nausea, vomiting or rectal bleeding.   URINARY: No frequency, dysuria, hematuria, or burning on urination.    /60   Ht 5' 3" (1.6 m)   Wt 63.1 kg (139 lb 1.8 oz)   Physical Exam:  APPEARANCE: Well nourished, well developed, in no acute distress.  SKIN: Normal skin turgor, no lesions.  NECK: Neck symmetric without masses   RESPIRATORY: Normal respiratory effort with no retractions or use of accessory muscles  ABDOMEN: Soft. No tenderness or masses. No hepatosplenomegaly. No hernias.  PELVIC: Normal external female genitalia " without lesions. Normal hair distribution. Adequate perineal body, normal urethral meatus. Urethra with no masses.  Bladder nontender. Vagina dry and atrophic; without lesions, + scant amount of thin white discharge. Cervix pink and without lesions. No significant cystocele or rectocele.     ASSESSMENT/PLAN:  Subacute vaginitis  -     Cancel: Vaginosis Screen by DNA Probe    Other orders  -     Vaginosis Screen by DNA Probe        -Reinforced to avoid any scented products in the vaginal area such as bubble baths, bath bombs, scented soaps/body washes, douches, feminine washes, etc... Also wear cotton underwear and change out of wet/sweaty clothing as soon as possible. Pt verbalized understanding.      Follow-up:  Will f/u with results  RTC PRN lack of improvement  RTC as needed

## 2020-01-17 DIAGNOSIS — I10 HYPERTENSION, ESSENTIAL: ICD-10-CM

## 2020-01-17 RX ORDER — LORAZEPAM 0.5 MG/1
TABLET ORAL
Qty: 30 TABLET | Refills: 0 | Status: SHIPPED | OUTPATIENT
Start: 2020-01-17 | End: 2020-01-21 | Stop reason: SDUPTHER

## 2020-01-21 ENCOUNTER — LAB VISIT (OUTPATIENT)
Dept: LAB | Facility: HOSPITAL | Age: 85
End: 2020-01-21
Attending: FAMILY MEDICINE
Payer: MEDICARE

## 2020-01-21 ENCOUNTER — OFFICE VISIT (OUTPATIENT)
Dept: PRIMARY CARE CLINIC | Facility: CLINIC | Age: 85
End: 2020-01-21
Payer: MEDICARE

## 2020-01-21 VITALS
WEIGHT: 136.69 LBS | DIASTOLIC BLOOD PRESSURE: 72 MMHG | BODY MASS INDEX: 24.22 KG/M2 | SYSTOLIC BLOOD PRESSURE: 120 MMHG | HEART RATE: 52 BPM | HEIGHT: 63 IN | TEMPERATURE: 98 F

## 2020-01-21 DIAGNOSIS — I10 ESSENTIAL HYPERTENSION: ICD-10-CM

## 2020-01-21 DIAGNOSIS — D64.9 ANEMIA, UNSPECIFIED TYPE: Primary | ICD-10-CM

## 2020-01-21 DIAGNOSIS — L98.9 SKIN LESION: ICD-10-CM

## 2020-01-21 DIAGNOSIS — M12.812 ROTATOR CUFF TEAR ARTHROPATHY, LEFT: ICD-10-CM

## 2020-01-21 DIAGNOSIS — D53.1 MEGALOBLASTIC ANEMIA: ICD-10-CM

## 2020-01-21 DIAGNOSIS — I10 HYPERTENSION, ESSENTIAL: ICD-10-CM

## 2020-01-21 DIAGNOSIS — D64.9 ANEMIA, UNSPECIFIED TYPE: ICD-10-CM

## 2020-01-21 DIAGNOSIS — Z00.00 ROUTINE GENERAL MEDICAL EXAMINATION AT A HEALTH CARE FACILITY: ICD-10-CM

## 2020-01-21 DIAGNOSIS — Z85.3 PERSONAL HISTORY OF BREAST CANCER: ICD-10-CM

## 2020-01-21 DIAGNOSIS — M75.102 ROTATOR CUFF TEAR ARTHROPATHY, LEFT: ICD-10-CM

## 2020-01-21 LAB
ANION GAP SERPL CALC-SCNC: 11 MMOL/L (ref 8–16)
BACTERIA #/AREA URNS AUTO: ABNORMAL /HPF
BILIRUB UR QL STRIP: ABNORMAL
BUN SERPL-MCNC: 26 MG/DL (ref 8–23)
CALCIUM SERPL-MCNC: 9.6 MG/DL (ref 8.7–10.5)
CHLORIDE SERPL-SCNC: 105 MMOL/L (ref 95–110)
CLARITY UR REFRACT.AUTO: ABNORMAL
CO2 SERPL-SCNC: 25 MMOL/L (ref 23–29)
COLOR UR AUTO: ABNORMAL
CREAT SERPL-MCNC: 1 MG/DL (ref 0.5–1.4)
EST. GFR  (AFRICAN AMERICAN): 58.9 ML/MIN/1.73 M^2
EST. GFR  (NON AFRICAN AMERICAN): 51.1 ML/MIN/1.73 M^2
FERRITIN SERPL-MCNC: 417 NG/ML (ref 20–300)
FOLATE SERPL-MCNC: 17.4 NG/ML (ref 4–24)
GLUCOSE SERPL-MCNC: 89 MG/DL (ref 70–110)
GLUCOSE UR QL STRIP: NEGATIVE
HGB UR QL STRIP: NEGATIVE
HYALINE CASTS UR QL AUTO: 68 /LPF
IRON SERPL-MCNC: 36 UG/DL (ref 30–160)
KETONES UR QL STRIP: ABNORMAL
LEUKOCYTE ESTERASE UR QL STRIP: NEGATIVE
MICROSCOPIC COMMENT: ABNORMAL
NITRITE UR QL STRIP: NEGATIVE
PH UR STRIP: 5 [PH] (ref 5–8)
POTASSIUM SERPL-SCNC: 4.4 MMOL/L (ref 3.5–5.1)
PROT UR QL STRIP: ABNORMAL
RBC #/AREA URNS AUTO: 1 /HPF (ref 0–4)
SATURATED IRON: 9 % (ref 20–50)
SODIUM SERPL-SCNC: 141 MMOL/L (ref 136–145)
SP GR UR STRIP: 1.03 (ref 1–1.03)
SQUAMOUS #/AREA URNS AUTO: 1 /HPF
TOTAL IRON BINDING CAPACITY: 395 UG/DL (ref 250–450)
TRANSFERRIN SERPL-MCNC: 267 MG/DL (ref 200–375)
URN SPEC COLLECT METH UR: ABNORMAL
VIT B12 SERPL-MCNC: 745 PG/ML (ref 210–950)
WBC #/AREA URNS AUTO: 2 /HPF (ref 0–5)

## 2020-01-21 PROCEDURE — 99397 PER PM REEVAL EST PAT 65+ YR: CPT | Mod: S$PBB,,, | Performed by: FAMILY MEDICINE

## 2020-01-21 PROCEDURE — 81001 URINALYSIS AUTO W/SCOPE: CPT

## 2020-01-21 PROCEDURE — 82728 ASSAY OF FERRITIN: CPT

## 2020-01-21 PROCEDURE — 99999 PR PBB SHADOW E&M-EST. PATIENT-LVL IV: CPT | Mod: PBBFAC,,, | Performed by: FAMILY MEDICINE

## 2020-01-21 PROCEDURE — 82607 VITAMIN B-12: CPT

## 2020-01-21 PROCEDURE — 83540 ASSAY OF IRON: CPT

## 2020-01-21 PROCEDURE — 80048 BASIC METABOLIC PNL TOTAL CA: CPT

## 2020-01-21 PROCEDURE — 99999 PR PBB SHADOW E&M-EST. PATIENT-LVL IV: ICD-10-PCS | Mod: PBBFAC,,, | Performed by: FAMILY MEDICINE

## 2020-01-21 PROCEDURE — 36415 COLL VENOUS BLD VENIPUNCTURE: CPT | Mod: PN

## 2020-01-21 PROCEDURE — 82746 ASSAY OF FOLIC ACID SERUM: CPT

## 2020-01-21 PROCEDURE — 99214 OFFICE O/P EST MOD 30 MIN: CPT | Mod: PBBFAC,PN | Performed by: FAMILY MEDICINE

## 2020-01-21 PROCEDURE — 99397 PR PREVENTIVE VISIT,EST,65 & OVER: ICD-10-PCS | Mod: S$PBB,,, | Performed by: FAMILY MEDICINE

## 2020-01-21 RX ORDER — LORAZEPAM 0.5 MG/1
TABLET ORAL
Qty: 30 TABLET | Refills: 0 | Status: SHIPPED | OUTPATIENT
Start: 2020-01-21 | End: 2020-03-02

## 2020-01-21 NOTE — PROGRESS NOTES
Subjective:       Patient ID: Gerda Lai is a 86 y.o. female.    Chief Complaint: Annual Exam    85 yo presents for a physical exam, last exam one year ago  Followed by Oncology Department for history of breast cancer    Review of Systems   Constitutional: Negative.  Negative for activity change, appetite change, chills, diaphoresis, fatigue, fever and unexpected weight change.   HENT: Negative.  Negative for congestion, ear pain, hearing loss, rhinorrhea, sinus pressure, sore throat, tinnitus, trouble swallowing and voice change.    Eyes: Negative.  Negative for photophobia, pain and visual disturbance.   Respiratory: Negative.  Negative for cough, chest tightness and shortness of breath.    Cardiovascular: Negative.  Negative for chest pain, palpitations and leg swelling.   Gastrointestinal: Negative.  Negative for abdominal distention, abdominal pain, blood in stool, constipation, diarrhea, nausea and vomiting.   Genitourinary: Negative.  Negative for difficulty urinating, dysuria, frequency and hematuria.   Musculoskeletal: Positive for arthralgias and neck pain. Negative for back pain, joint swelling and neck stiffness.        Recurrent diffuse arthralgias, history of DJD.  Left sided posterior neck pain, improved with Tylenol   Skin: Positive for color change. Negative for pallor and rash.   Neurological: Negative.  Negative for dizziness, tremors, speech difficulty, weakness, light-headedness, numbness and headaches.   Hematological: Negative for adenopathy. Does not bruise/bleed easily.   Psychiatric/Behavioral: Negative for agitation, confusion, dysphoric mood, hallucinations and sleep disturbance. The patient is not nervous/anxious.        Objective:      Physical Exam   Constitutional: She is oriented to person, place, and time. She appears well-developed and well-nourished.   HENT:   Right Ear: Tympanic membrane, external ear and ear canal normal.   Left Ear: Tympanic membrane, external ear and ear  canal normal.   Nose: Nose normal.   Mouth/Throat: Oropharynx is clear and moist. No posterior oropharyngeal erythema.   Eyes: Pupils are equal, round, and reactive to light. Conjunctivae and EOM are normal.   Neck: Normal range of motion. No tracheal deviation present. No thyromegaly present.   Decreased lateral gaze to 10-20 degrees bilaterally with tenderness over lateral cervical spine, at about C4-5   Cardiovascular: Normal rate, regular rhythm, normal heart sounds and intact distal pulses.   Pulmonary/Chest: Effort normal. She has no wheezes. She has no rales. She exhibits no tenderness.   Abdominal: Soft. Bowel sounds are normal. She exhibits no distension and no mass. There is no rebound.   Musculoskeletal: She exhibits no edema or tenderness.   Unable to raise right arm without help   Lymphadenopathy:     She has no cervical adenopathy.   Neurological: She is alert and oriented to person, place, and time. She has normal reflexes. No cranial nerve deficit. Coordination normal.   Skin: Skin is warm and dry. No rash noted. No erythema.        Raised lesion about 1cm left lateral thigh   Psychiatric: She has a normal mood and affect. Her behavior is normal.       Assessment:     1.  Physical exam  2.  Hypertension  3.  Anxiety  4.  DJD  5.  Rotator cuff injury  6.  Cervical DJD  7.  Breast cancer  8.  Anemia  9.  Skin lesion, ? BCC     Plan:         1.  Labs reviewed with pt  2.  Iron and TIBC, ferritin, B12, folate level  3.  Continue present medications  4.  Tylenol 1Gm tid  5.  Consult Dermatology

## 2020-01-22 ENCOUNTER — TELEPHONE (OUTPATIENT)
Dept: PRIMARY CARE CLINIC | Facility: CLINIC | Age: 85
End: 2020-01-22

## 2020-01-22 ENCOUNTER — TELEPHONE (OUTPATIENT)
Dept: DERMATOLOGY | Facility: CLINIC | Age: 85
End: 2020-01-22

## 2020-01-22 NOTE — PROGRESS NOTES
"Digital Medicine: Health  Follow-Up    Ms. Lorenz reports that she's doing well.     The history is provided by the patient.     Follow Up  Follow-up reason(s): reading review and routine education      Readings are trending down due to medication adherence.    Routine Education Topics: eating patterns and physical activity        INTERVENTION(S)  encouragement/support    PLAN  patient verbalizes understanding      There are no preventive care reminders to display for this patient.    Last 5 Patient Entered Readings                                      Current 30 Day Average: 205/82     Recent Readings 1/15/2020 1/15/2020 1/12/2020 1/12/2020 1/10/2020    SBP (mmHg) 160 160 193 193 204    DBP (mmHg) 62 62 76 76 79    Pulse 60 60 64 64 72            Diet Screening       Patient reports that her diet hasn't been that great lately. We didn't address this much today. Will follow up more about it next encounter.     Physical Activity Screening       Patient reports that she's starting rehab to help strengthen her legs and arms tomorrow. Patient reports that she loves PT and has done it many times in the past. She continues to follow old HEP. Will continue to help her stay on track with exercise regimen and help with goal of getting back into the pool.     Patient mentioned a fear of falling to me. We discussed the importance of strengthening her legs. Was unable to assess if she uses assistive walking devices. Will inquire next encounter.     Medication Adherence Screening       Patient is concerned that her new medication change with labetalol is "working too well". Patient's BP was 120/72 in recent office visit. She also reports experiencing some dizziness, which feeds into her fear of falling. Will make PharmD aware.     "

## 2020-01-22 NOTE — TELEPHONE ENCOUNTER
Spoke with patient and advised that I have called in the Lorazepam rx this morning.  She also wants to report to Dr. Sellers that she called Dr. Curtis's office about the skin cancer on her leg and they cannot see her until March.  What does he suggest, does he want to see her to remove it, or should she wait?

## 2020-01-22 NOTE — TELEPHONE ENCOUNTER
----- Message from Janice Betanocurt sent at 1/22/2020  8:28 AM CST -----  Contact: Self   [phone]  Patient  [facility] [address] do not have her LORazepam (ATIVAN) 0.5 MG tablet refill approval. Dr. Sellers was to have called it in yesterday.  Please call this in for her this morning.

## 2020-01-22 NOTE — TELEPHONE ENCOUNTER
----- Message from Melanie Steiner MA sent at 1/22/2020  8:52 AM CST -----  Contact: Patient      ----- Message -----  From: Joyce Mcmahan  Sent: 1/22/2020   8:20 AM CST  To: Kirsten ZABALA Staff    Need Advice:      Reason: Patient has mp on leg size of a dime she's concerned about. Patient has a referral in as well      Communication Preference: [phone]      Additional Information:

## 2020-01-23 ENCOUNTER — TELEPHONE (OUTPATIENT)
Dept: PRIMARY CARE CLINIC | Facility: CLINIC | Age: 85
End: 2020-01-23

## 2020-01-23 ENCOUNTER — CLINICAL SUPPORT (OUTPATIENT)
Dept: REHABILITATION | Facility: HOSPITAL | Age: 85
End: 2020-01-23
Attending: INTERNAL MEDICINE
Payer: MEDICARE

## 2020-01-23 ENCOUNTER — PATIENT OUTREACH (OUTPATIENT)
Dept: OTHER | Facility: OTHER | Age: 85
End: 2020-01-23

## 2020-01-23 DIAGNOSIS — R26.81 UNSTEADY GAIT: ICD-10-CM

## 2020-01-23 DIAGNOSIS — I10 HYPERTENSION, ESSENTIAL: ICD-10-CM

## 2020-01-23 DIAGNOSIS — I10 ESSENTIAL HYPERTENSION: ICD-10-CM

## 2020-01-23 PROCEDURE — 97162 PT EVAL MOD COMPLEX 30 MIN: CPT | Mod: PO

## 2020-01-23 PROCEDURE — 97530 THERAPEUTIC ACTIVITIES: CPT | Mod: PO

## 2020-01-23 RX ORDER — LABETALOL 100 MG/1
200 TABLET, FILM COATED ORAL EVERY 12 HOURS
Qty: 180 TABLET | Refills: 3 | OUTPATIENT
Start: 2020-01-23 | End: 2020-02-10

## 2020-01-23 RX ORDER — FELODIPINE 5 MG/1
5 TABLET, EXTENDED RELEASE ORAL DAILY
Qty: 30 TABLET | Refills: 1
Start: 2020-01-23 | End: 2020-02-10

## 2020-01-23 NOTE — PROGRESS NOTES
[facility]  Physical Therapy Initial Evaluation    Name: Gerda Lai  Clinic Number: [id number]    Therapy Diagnosis:   Encounter Diagnosis   Name Primary?   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   M47.816 (ICD-10-CM) - Lumbar spondylosis   M47.812 (ICD-10-CM) - Cervical spondylosis   M17.0 (ICD-10-CM) - Osteoarthritis of both knees, unspecified osteoarthritis type       Evaluation Date: 1/23/2020  Authorization Period Expiration: 01/08/2021  Plan of Care Expiration: 3/20/2020  Visit # / Visits authorized: 1/ 1    Time In: 2:15  Time Out: 3:15  Total Billable Time: 60 minutes    Precautions: Standard and Fall    Subjective   Date of onset: Pt reported that she is feeling like she would like to work on her stability  History of current condition - Gerda reports: Pt has  A Hx of B rotator cuff tears, lumbar spondylosis, cervical spondylosis and B knee osteoarthritis.     Medical History:   Past Medical History:   Diagnosis Date    Arthritis     Basal cell carcinoma 09/2016    right post auricular neck     Breast cancer 1992    right    Cataract     Fibromyalgia     History of measles as a child     Hyperlipidemia     Hypertension     Personal history of colonic polyps     Pneumonia     SCC (squamous cell carcinoma) 2015    R chest    SCC (squamous cell carcinoma) 2016    left medial shoulder    SCC (squamous cell carcinoma) 2017    left knee    Shingles     Squamous cell carcinoma 2015    right forearm    Thyroid disease     Vaginitis        Surgical History:   Gerda Lai  has a past surgical history that includes thyriodectomy; Total hip arthroplasty; tonsillectomy; Adenoidectomy; Cataract extraction w/  intraocular lens implant (Bilateral); Yag  (Left); Breast lumpectomy (Right,  1992); Breast biopsy (Left); Eye surgery; Joint replacement; and Tonsillectomy.    Medications:   Gerda has a current medication list which includes the following prescription(s): acetaminophen, albuterol, alendronate, b infantis/b ani/b magali/b bifid, betamethasone valerate 0.1%, coenzyme q10, diclofenac sodium, felodipine, glucosamine-chondroitin, ipratropium, labetalol, lorazepam, multivitamin, omeprazole, polymyxin b sulf-trimethoprim, pravastatin, tekturna, and turmeric.    Allergies:   Review of patient's allergies indicates:   Allergen Reactions    Sulfa (sulfonamide antibiotics) Rash    Clarithromycin Other (See Comments)     Weak, extreme fatigue. dizziness    Flexeril [cyclobenzaprine] Other (See Comments)     Dizziness    Iodine and iodide containing products     Lisinopril Other (See Comments)     Cough and sensation of throat swelling/?angioedema    Losartan Rash    Metoprolol Swelling     Tightness in throat    Tramadol Other (See Comments)     Dizzy and weak    Verapamil (bulk) Palpitations    Voltaren [diclofenac sodium] Other (See Comments)     Drops blood pressure        Imaging, MRI studies:     Prior Therapy: yes  Social History: Pt lives in a single story home with one step into her house.  Pt lives alone  Occupation: retired  Prior Level of Function: pt has had difficulty getting around  Current Level of Function: Pt reports that she has daily pain I her neck with ADL's and feels as if she is not very steady on her feet    Pain:  Current 4/10, worst 10/10, best 1/10   Location: bilateral neck   Description: Aching  Aggravating Factors: moving her neck   Easing Factors: avoiding moving her neck into painfull positions.    Pts goals: Pt reported that she would like to feel more stable while she is walking    Objective   Observation: Pt was able to enter the clinic ambulating independently with a single point cane    Posture:  Forward head and rounded shoulders      Cervical Range of  Motion:    Degrees Pain   Flexion 45 yes     Extension 12 yes     Right Rotation nt nt     Left Rotation nt nt     Right Side Bending 27 yes   Left Side Bending 25 yes          Upper Extremity Strength not tested today        Balance Assessment:   TUG 21 seconds    DIOP Assessment    1. Sitting to Standing   3 - able to stand independely using hands  2. Standing Unsupported   4 - able to stand safely 2 minutes without hold  3. Sitting Unsupported   4 - able to sit safely and securely 2 minutes  4. Standing to Sitting   3 - controls descent by using hands  5. Pivot Transfer   3 - able to transfer safely with definite use of hands  6. Standing with Eyes Closed   3 - able to stand 10 seconds with supervision  7. Standing with Feet Together   4 - able to place feet together independently and stand 1 minute safely  8. Reaching Forward with Outstretched Arm   3 - can reach forward 12 cm/5 inches safely  9. Retrieving Object from Floor   3 - able to pick slipper but needs supervision  10. Turning to Look Behind   3 - looks behind one side only, other side less weight shift  11. Turning 360 Degrees   2 - able to turn 360 safely but slowly  12. Placing Alternate Foot on Step   0 - needs assist to keep from falling/unable to try  13. Standing with One Foot in Front   2 - able to take small step indenpendently and hold 30 seconds  14. Standing on One Foot   0 - unable to try or needs assistance to prevent fall  Total: 37  Maximum: 56    Sensation: decreased light touch sense in B hands      TREATMENT   Treatment Time In: 3:00  Treatment Time Out: 3:15  Total Treatment time separate from Evaluation: 15 minutes    Gerda received therapeutic exercises to develop strength, endurance, posture and core stabilization for 15 minutes including:  A review of her HEP with the addition seated marching    Home Exercises and Patient Education Provided    Education provided:   - yes    Written Home Exercises Provided: yes.  Exercises were  reviewed and Gerda was able to demonstrate them prior to the end of the session.  Gerda demonstrated good  understanding of the education provided.     See EMR under Patient Instructions for exercises provided 1/23/2020.    Assessment   Gerda is a 86 y.o. 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 Pt presents with limited Cx spine and B Shulder AROM and high risk for falls    Pt prognosis is Good.   Pt will benefit from skilled outpatient Physical Therapy to address the deficits stated above and in the chart below, provide pt/family education, and to maximize pt's level of independence.     Plan of care discussed with patient: Yes  Pt's spiritual, cultural and educational needs considered and patient is agreeable to the plan of care and goals as stated below:     Anticipated Barriers for therapy: none    Medical Necessity is demonstrated by the following  History  Co-morbidities and personal factors that may impact the plan of care Co-morbidities:   advanced age, history of cancer, HTN and Thyroid disease, B rotator cuff tears, O/A of B knees and high risk for falls    Personal Factors:   age     moderate   Examination  Body Structures and Functions, activity limitations and participation restrictions that may impact the plan of care Body Regions:   neck  lower extremities  upper extremities  trunk    Body Systems:    ROM  strength  balance  gait  transfers  blood pressure    Participation Restrictions:   Overhead workzz    Activity limitations:   Learning and applying knowledge  no deficits    General Tasks and Commands  no deficits    Communication  no deficits    Mobility  lifting and carrying  objects  walking    Self care  dressing    Domestic Life  doing house work (cleaning house, washing dishes, laundry)    Interactions/Relationships  no deficits    Life Areas  no deficits    Community and Social Life  no deficits         moderate   Clinical Presentation evolving clinical presentation with changing clinical characteristics moderate   Decision Making/ Complexity Score: moderate     Goals:  Short Term Goals: 4 weeks   Pt will be independent in a HEP to address their functional deficits related to balance   Pt will improve her TUG time to 18 seconds to show improvement in dynamic standing balance  Improve Garcia balance scale score to 40/56  to show improvement in dynamic standing balance     Long Term Goals: 8 weeks   Pt will improve her TUG time to 14 seconds to show improvement in dynamic standing balance  Improve Garcia balance scale score to 42/56  to show improvement in dynamic standing balance     Plan   Plan of care Certification: 1/23/2020 to 3/20/2020.    Outpatient Physical Therapy 2 times weekly for 8 weeks to include the following interventions: Gait Training, Manual Therapy, Moist Heat/ Ice, Neuromuscular Re-ed, Patient Education, Self Care, Therapeutic Activites, Therapeutic Exercise and modalities as indicated and Dry needling.     Ebenezer Sanchez, PT

## 2020-01-23 NOTE — PROGRESS NOTES
"Digital Medicine: Clinician Follow-Up    Called patient for hypertension follow-up. She reported concerns of dizziness to health  Celina in her call on yesterday that I wanted to follow-up on. Per Ms. Lorenz,   "I really think the felodipine is working but I think it might be a little bit too much with the labetalol I'm taking." Patient confirmed having some dizziness in the morning. She does not think she is drinking enough fluids daily and admits that she may not be eating regularly. "I don't think I've been eating properly and feel that I might be a little bit depressed." In regards to her depression, she states that she has had a lot of health concerns that have been baffling or hard to control that sadden her. She also states that she has a questionable mp on her thigh that may be skin cancer that she is now concerned about.     The history is provided by the patient. No  was used.     Follow Up  Follow-up reason(s): reading review, medication change and routine education      Readings are trending down due to medication adherence.    Medication Change: dose decrease        INTERVENTION(S)  recommended med change and encouragement/support    PLAN  patient verbalizes understanding, patient amenable to changes and continue monitoring    Current 30-day BP avg of 201/80 is uncontrolled, does not meet goal of <150/90.  Reviewed readings: SBP/DBP is trending downwards where DBP is consistently controlled however SBP is still consistently elevated but improving.  Discussed current regimen.   Discussed importance to drink plenty of fluids and eat three meals daily to rule out dehydration and low blood glucose as cause of dizziness. Patient agreed.    Decreased labetalol from 250 mg BID to 200 mg BID to reduce ADRs of dizziness.  Continue Tekturna and felodipine as prescribed.    Follow-up in one week.      There are no preventive care reminders to display for this patient.    Last 5 Patient " Entered Readings                                      Current 30 Day Average: 201/80     Recent Readings 1/23/2020 1/23/2020 1/15/2020 1/15/2020 1/12/2020    SBP (mmHg) 169 169 160 160 193    DBP (mmHg) 65 65 62 62 76    Pulse 58 58 60 60 64           Hypertension Medications             felodipine (PLENDIL) 5 MG 24 hr tablet Take 1 tablet (5 mg total) by mouth once daily. Take in the afternoon.    labetalol (NORMODYNE) 100 MG tablet Take 2 tablets (200 mg total) by mouth every 12 (twelve) hours.    TEKTURNA 300 mg Tab TAKE 1 TABLET (300 MG TOTAL) BY MOUTH ONCE DAILY.                 Screenings

## 2020-01-23 NOTE — TELEPHONE ENCOUNTER
Detailed message left on patient's callback number to please return call to discuss an increase in one of her medications.

## 2020-01-24 ENCOUNTER — TELEPHONE (OUTPATIENT)
Dept: DERMATOLOGY | Facility: CLINIC | Age: 85
End: 2020-01-24

## 2020-01-24 ENCOUNTER — PATIENT OUTREACH (OUTPATIENT)
Dept: OTHER | Facility: OTHER | Age: 85
End: 2020-01-24

## 2020-01-24 NOTE — TELEPHONE ENCOUNTER
----- Message from Meng Staley LPN sent at 1/22/2020  4:56 PM CST -----  Contact: pt      ----- Message -----  From: Steven Infante  Sent: 1/22/2020   4:47 PM CST  To: Kirsten ZABALA Staff    Pt is returning call from Dr. Curtis's office. Please give pt a call back at [phone] .

## 2020-01-24 NOTE — TELEPHONE ENCOUNTER
Spoke with patient at length this morning to explain to her that Dr. Sellers wants her to increase her Plendil to 5mg daily.  Take 2 of her current rx of 2.5mg until she runs out and then  the 5mg pills that he sent to the pharmacy yesterday.  We then had to go over [facility] Medicines note to reiterate to her that both Dr. Sellers and April are on the same page and she needs to follow what they tell her and not switch up to her own regimen.  Patient finally agrees and is able to repeat back to me what she is supposed to be taking and at what times of day.

## 2020-01-24 NOTE — PROGRESS NOTES
"Digital Medicine: Clinician Follow-Up    Ms. Gerda Lai called concerned about a medication request she received from Dr. Sellers's nurse this morning to increase felodipine to 5 mg daily. She is questioning if she should increase medication after changes made on yesterday. She confirmed that she reduced the dose of labetalol that we discussed on yesterday. She also reports that symptoms of dizziness that were reported on yesterday did not occur this morning with reduced dose of labetalol. She reports :I drank more water than usual but I still didn't drank as much as you told me to." So she is uncertain if the lack of dizziness was from improved hydration or the medication decrease.     The history is provided by the patient. No  was used.     Follow Up  Follow-up reason(s): reading review and routine education      Readings are trending down       INTERVENTION(S)  encouragement/support    PLAN  patient verbalizes understanding and continue monitoring    Advised patient to hold off on medication increase at this time. She has a history of being very sensitive to medication changes and complaints of ADRs to CCB that work well for managing her BP.   Instructed Ms. Lorenz to continue with 200 mg of labetalol twice daily as discussed along with felodipine 2.5 mg and Tekturna. She is to continue measuring her BP regularly for monitoring. If we notice that her BP is not well-controlled, she will need to increase felodipine to 5 mg for better BP control.   Also advised patient that if she notices her BP is rising over the weekend, >160/90, she is to take 5 mg of felodipine for better BP control.  Patient requests to call me on Monday to f/u on the weekend.  She understands that felodipine will be increased to better control her BP over the next week or two weeks.  Communicated the above to PCP Area.    Follow-up in one week.      There are no preventive care reminders to display for this " patient.    Last 5 Patient Entered Readings                                      Current 30 Day Average: 201/80     Recent Readings 1/23/2020 1/23/2020 1/15/2020 1/15/2020 1/12/2020    SBP (mmHg) 169 169 160 160 193    DBP (mmHg) 65 65 62 62 76    Pulse 58 58 60 60 64             Hypertension Medications             felodipine (PLENDIL) 5 MG 24 hr tablet Take 1 tablet (5 mg total) by mouth once daily. Take in the afternoon.    labetalol (NORMODYNE) 100 MG tablet Take 2 tablets (200 mg total) by mouth every 12 (twelve) hours.    TEKTURNA 300 mg Tab TAKE 1 TABLET (300 MG TOTAL) BY MOUTH ONCE DAILY.                 Screenings

## 2020-01-24 NOTE — PLAN OF CARE
[facility]  Physical Therapy Initial Evaluation    Name: Gerda Lai  Clinic Number: [id number]    Therapy Diagnosis:   Encounter Diagnosis   Name Primary?   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   M47.816 (ICD-10-CM) - Lumbar spondylosis   M47.812 (ICD-10-CM) - Cervical spondylosis   M17.0 (ICD-10-CM) - Osteoarthritis of both knees, unspecified osteoarthritis type       Evaluation Date: 1/23/2020  Authorization Period Expiration: 01/08/2021  Plan of Care Expiration: 3/20/2020  Visit # / Visits authorized: 1/ 1    Time In: 2:15  Time Out: 3:15  Total Billable Time: 60 minutes    Precautions: Standard and Fall    Subjective   Date of onset: Pt reported that she is feeling like she would like to work on her stability  History of current condition - Gerda reports: Pt has  A Hx of B rotator cuff tears, lumbar spondylosis, cervical spondylosis and B knee osteoarthritis.     Medical History:   Past Medical History:   Diagnosis Date    Arthritis     Basal cell carcinoma 09/2016    right post auricular neck     Breast cancer 1992    right    Cataract     Fibromyalgia     History of measles as a child     Hyperlipidemia     Hypertension     Personal history of colonic polyps     Pneumonia     SCC (squamous cell carcinoma) 2015    R chest    SCC (squamous cell carcinoma) 2016    left medial shoulder    SCC (squamous cell carcinoma) 2017    left knee    Shingles     Squamous cell carcinoma 2015    right forearm    Thyroid disease     Vaginitis        Surgical History:   Gerda Lai  has a past surgical history that includes thyriodectomy; Total hip arthroplasty; tonsillectomy; Adenoidectomy; Cataract extraction w/  intraocular lens implant (Bilateral); Yag  (Left); Breast lumpectomy (Right,  1992); Breast biopsy (Left); Eye surgery; Joint replacement; and Tonsillectomy.    Medications:   Gerda has a current medication list which includes the following prescription(s): acetaminophen, albuterol, alendronate, b infantis/b ani/b magali/b bifid, betamethasone valerate 0.1%, coenzyme q10, diclofenac sodium, felodipine, glucosamine-chondroitin, ipratropium, labetalol, lorazepam, multivitamin, omeprazole, polymyxin b sulf-trimethoprim, pravastatin, tekturna, and turmeric.    Allergies:   Review of patient's allergies indicates:   Allergen Reactions    Sulfa (sulfonamide antibiotics) Rash    Clarithromycin Other (See Comments)     Weak, extreme fatigue. dizziness    Flexeril [cyclobenzaprine] Other (See Comments)     Dizziness    Iodine and iodide containing products     Lisinopril Other (See Comments)     Cough and sensation of throat swelling/?angioedema    Losartan Rash    Metoprolol Swelling     Tightness in throat    Tramadol Other (See Comments)     Dizzy and weak    Verapamil (bulk) Palpitations    Voltaren [diclofenac sodium] Other (See Comments)     Drops blood pressure        Imaging, MRI studies:     Prior Therapy: yes  Social History: Pt lives in a single story home with one step into her house.  Pt lives alone  Occupation: retired  Prior Level of Function: pt has had difficulty getting around  Current Level of Function: Pt reports that she has daily pain I her neck with ADL's and feels as if she is not very steady on her feet    Pain:  Current 4/10, worst 10/10, best 1/10   Location: bilateral neck   Description: Aching  Aggravating Factors: moving her neck   Easing Factors: avoiding moving her neck into painfull positions.    Pts goals: Pt reported that she would like to feel more stable while she is walking    Objective   Observation: Pt was able to enter the clinic ambulating independently with a single point cane    Posture:  Forward head and rounded shoulders      Cervical Range of  Motion:    Degrees Pain   Flexion 45 yes     Extension 12 yes     Right Rotation nt nt     Left Rotation nt nt     Right Side Bending 27 yes   Left Side Bending 25 yes          Upper Extremity Strength not tested today        Balance Assessment:   TUG 21 seconds    DIOP Assessment    1. Sitting to Standing   3 - able to stand independely using hands  2. Standing Unsupported   4 - able to stand safely 2 minutes without hold  3. Sitting Unsupported   4 - able to sit safely and securely 2 minutes  4. Standing to Sitting   3 - controls descent by using hands  5. Pivot Transfer   3 - able to transfer safely with definite use of hands  6. Standing with Eyes Closed   3 - able to stand 10 seconds with supervision  7. Standing with Feet Together   4 - able to place feet together independently and stand 1 minute safely  8. Reaching Forward with Outstretched Arm   3 - can reach forward 12 cm/5 inches safely  9. Retrieving Object from Floor   3 - able to pick slipper but needs supervision  10. Turning to Look Behind   3 - looks behind one side only, other side less weight shift  11. Turning 360 Degrees   2 - able to turn 360 safely but slowly  12. Placing Alternate Foot on Step   0 - needs assist to keep from falling/unable to try  13. Standing with One Foot in Front   2 - able to take small step indenpendently and hold 30 seconds  14. Standing on One Foot   0 - unable to try or needs assistance to prevent fall  Total: 37  Maximum: 56    Sensation: decreased light touch sense in B hands      TREATMENT   Treatment Time In: 3:00  Treatment Time Out: 3:15  Total Treatment time separate from Evaluation: 15 minutes    Gerda received therapeutic exercises to develop strength, endurance, posture and core stabilization for 15 minutes including:  A review of her HEP with the addition seated marching    Home Exercises and Patient Education Provided    Education provided:   - yes    Written Home Exercises Provided: yes.  Exercises were  reviewed and Gerda was able to demonstrate them prior to the end of the session.  Gerda demonstrated good  understanding of the education provided.     See EMR under Patient Instructions for exercises provided 1/23/2020.    Assessment   Gerda is a 86 y.o. 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 Pt presents with limited Cx spine and B Shulder AROM and high risk for falls    Pt prognosis is Good.   Pt will benefit from skilled outpatient Physical Therapy to address the deficits stated above and in the chart below, provide pt/family education, and to maximize pt's level of independence.     Plan of care discussed with patient: Yes  Pt's spiritual, cultural and educational needs considered and patient is agreeable to the plan of care and goals as stated below:     Anticipated Barriers for therapy: none    Medical Necessity is demonstrated by the following  History  Co-morbidities and personal factors that may impact the plan of care Co-morbidities:   advanced age, history of cancer, HTN and Thyroid disease, B rotator cuff tears, O/A of B knees and high risk for falls    Personal Factors:   age     moderate   Examination  Body Structures and Functions, activity limitations and participation restrictions that may impact the plan of care Body Regions:   neck  lower extremities  upper extremities  trunk    Body Systems:    ROM  strength  balance  gait  transfers  blood pressure    Participation Restrictions:   Overhead workzz    Activity limitations:   Learning and applying knowledge  no deficits    General Tasks and Commands  no deficits    Communication  no deficits    Mobility  lifting and carrying  objects  walking    Self care  dressing    Domestic Life  doing house work (cleaning house, washing dishes, laundry)    Interactions/Relationships  no deficits    Life Areas  no deficits    Community and Social Life  no deficits         moderate   Clinical Presentation evolving clinical presentation with changing clinical characteristics moderate   Decision Making/ Complexity Score: moderate     Goals:  Short Term Goals: 4 weeks   Pt will be independent in a HEP to address their functional deficits related to balance   Pt will improve her TUG time to 18 seconds to show improvement in dynamic standing balance  Improve Garcia balance scale score to 40/56  to show improvement in dynamic standing balance     Long Term Goals: 8 weeks   Pt will improve her TUG time to 14 seconds to show improvement in dynamic standing balance  Improve Garcia balance scale score to 42/56  to show improvement in dynamic standing balance     Plan   Plan of care Certification: 1/23/2020 to 3/20/2020.    Outpatient Physical Therapy 2 times weekly for 8 weeks to include the following interventions: Gait Training, Manual Therapy, Moist Heat/ Ice, Neuromuscular Re-ed, Patient Education, Self Care, Therapeutic Activites, Therapeutic Exercise and modalities as indicated and Dry needling.     Ebenezer Sanchez, PT

## 2020-01-27 ENCOUNTER — PATIENT OUTREACH (OUTPATIENT)
Dept: OTHER | Facility: OTHER | Age: 85
End: 2020-01-27

## 2020-01-27 ENCOUNTER — PATIENT MESSAGE (OUTPATIENT)
Dept: CARDIOLOGY | Facility: CLINIC | Age: 85
End: 2020-01-27

## 2020-01-27 ENCOUNTER — OFFICE VISIT (OUTPATIENT)
Dept: PRIMARY CARE CLINIC | Facility: CLINIC | Age: 85
End: 2020-01-27
Payer: MEDICARE

## 2020-01-27 VITALS
DIASTOLIC BLOOD PRESSURE: 58 MMHG | TEMPERATURE: 98 F | BODY MASS INDEX: 24.84 KG/M2 | SYSTOLIC BLOOD PRESSURE: 144 MMHG | WEIGHT: 140.19 LBS | HEIGHT: 63 IN | HEART RATE: 56 BPM

## 2020-01-27 DIAGNOSIS — L98.9 SKIN LESION OF LEFT LEG: Primary | ICD-10-CM

## 2020-01-27 PROCEDURE — 88305 TISSUE EXAM BY PATHOLOGIST: CPT | Performed by: PATHOLOGY

## 2020-01-27 PROCEDURE — 1159F PR MEDICATION LIST DOCUMENTED IN MEDICAL RECORD: ICD-10-PCS | Mod: ,,, | Performed by: FAMILY MEDICINE

## 2020-01-27 PROCEDURE — 11104 PUNCH BX SKIN SINGLE LESION: CPT | Mod: S$PBB,,, | Performed by: FAMILY MEDICINE

## 2020-01-27 PROCEDURE — 88305 TISSUE EXAM BY PATHOLOGIST: CPT | Mod: 26,,, | Performed by: PATHOLOGY

## 2020-01-27 PROCEDURE — 99214 OFFICE O/P EST MOD 30 MIN: CPT | Mod: S$PBB,25,, | Performed by: FAMILY MEDICINE

## 2020-01-27 PROCEDURE — 99999 PR PBB SHADOW E&M-EST. PATIENT-LVL III: CPT | Mod: PBBFAC,,, | Performed by: FAMILY MEDICINE

## 2020-01-27 PROCEDURE — 88305 TISSUE EXAM BY PATHOLOGIST: ICD-10-PCS | Mod: 26,,, | Performed by: PATHOLOGY

## 2020-01-27 PROCEDURE — 11104 PUNCH BX SKIN SINGLE LESION: CPT | Mod: PBBFAC,PN | Performed by: FAMILY MEDICINE

## 2020-01-27 PROCEDURE — 1159F MED LIST DOCD IN RCRD: CPT | Mod: ,,, | Performed by: FAMILY MEDICINE

## 2020-01-27 PROCEDURE — 99213 OFFICE O/P EST LOW 20 MIN: CPT | Mod: PBBFAC,PN,25 | Performed by: FAMILY MEDICINE

## 2020-01-27 PROCEDURE — 11104 PR PUNCH BIOPSY, SKIN, SINGLE LESION: ICD-10-PCS | Mod: S$PBB,,, | Performed by: FAMILY MEDICINE

## 2020-01-27 PROCEDURE — 99214 PR OFFICE/OUTPT VISIT, EST, LEVL IV, 30-39 MIN: ICD-10-PCS | Mod: S$PBB,25,, | Performed by: FAMILY MEDICINE

## 2020-01-27 PROCEDURE — 99999 PR PBB SHADOW E&M-EST. PATIENT-LVL III: ICD-10-PCS | Mod: PBBFAC,,, | Performed by: FAMILY MEDICINE

## 2020-01-27 NOTE — PROGRESS NOTES
Subjective:       Patient ID: Gerda Lai is a 86 y.o. female.    Chief Complaint: Cancer (remove from left thight)    87 yo presents today for removal of skin lesion, lateral aspect of left thigh, present for about one month.  Pt noted rapid growth of lesion.  No history of trauma    Review of Systems   Skin: Positive for color change.       Objective:      Physical Exam   Skin:   1.5x2cm raised circumscribed lesion, friable, lateral aspect of left thigh.  Pearly, filliform       Assessment:       1. Skin lesion of left leg        Plan:     After informed verbal consent, using 2% Xylocaine and sterile technique, 4mm punch biopsy done of the lesion.  Specimen sent to pathology, electrocautery to site, sterile dressing applied. Pt tolerated the procedure with no problems or side effects.   Call if any signs of infection (swelling, redness) occur

## 2020-01-27 NOTE — PROGRESS NOTES
"Digital Medicine: Clinician Follow-Up    Called patient for hypertension follow-up over the weekend. She reports that she only measured on Saturday and reading was "back to normal range." She did have to take 2 tablets of felodipine as instructed if reading was elevated above 160/90 due to reading she recorded on Friday 1/24/20 of 180/83. She completed her appointment with PCP Area today where a sample of a skin lesion was taken. "I'm just not feeling for certain on my feet but I'm not sure if that has anything to do with my blood pressure medication. I do feel better with the blood pressure where it is right now." Patient confirmed she is taking labetalol 200 mg twice daily, Tekturna every evening and felodipine 5 mg daily.    The history is provided by the patient. No  was used.     Follow Up  Follow-up reason(s): reading review, medication change follow-up and routine education      Readings are trending down due to medication adherence.    Medication Change: dose increase    Patient started new medication.    Date:  1/25/2020  Is patient tolerating med change?:  Yes      INTERVENTION(S)  encouragement/support    PLAN  patient verbalizes understanding and continue monitoring    Current 30-day BP avg of 193/78 is uncontrolled, does not meet goal of <150/90.  Reviewed readings: SBP/DBP is trending downwards where DBP continues to be well-controlled and SBP continues to be consistently elevated above goal.    Continue current regimen.  Advised patient to drink more water - goal 64 oz daily  Advised patient to eat 3 meals daily.  Continue to measure BP regularly for monitoring.    Follow-up in one week.      There are no preventive care reminders to display for this patient.    Last 5 Patient Entered Readings                                      Current 30 Day Average: 193/78     Recent Readings 1/25/2020 1/25/2020 1/24/2020 1/24/2020 1/24/2020    SBP (mmHg) 150 150 180 180 214    DBP (mmHg) 61 61 " 83 83 73    Pulse 72 72 64 64 56             Hypertension Medications             felodipine (PLENDIL) 5 MG 24 hr tablet Take 1 tablet (5 mg total) by mouth once daily. Take in the afternoon.    labetalol (NORMODYNE) 100 MG tablet Take 2 tablets (200 mg total) by mouth every 12 (twelve) hours.    TEKTURNA 300 mg Tab TAKE 1 TABLET (300 MG TOTAL) BY MOUTH ONCE DAILY.                 Screenings

## 2020-02-04 ENCOUNTER — TELEPHONE (OUTPATIENT)
Dept: PRIMARY CARE CLINIC | Facility: CLINIC | Age: 85
End: 2020-02-04

## 2020-02-04 DIAGNOSIS — C44.92 CANCER OF SKIN, SQUAMOUS CELL: Primary | ICD-10-CM

## 2020-02-04 LAB
FINAL PATHOLOGIC DIAGNOSIS: NORMAL
GROSS: NORMAL
MICROSCOPIC EXAM: NORMAL

## 2020-02-04 NOTE — TELEPHONE ENCOUNTER
Informed patient that Dermatology will contact her and set her appointment with Dr. Benoit verbalized understanding.

## 2020-02-05 ENCOUNTER — TELEPHONE (OUTPATIENT)
Dept: PRIMARY CARE CLINIC | Facility: CLINIC | Age: 85
End: 2020-02-05

## 2020-02-05 NOTE — TELEPHONE ENCOUNTER
Patient wanted to know the name of the cancer unable to pull it up on the patient portal reviewed with patient and she verbalized understanding of the type of skin cancer.

## 2020-02-05 NOTE — TELEPHONE ENCOUNTER
----- Message from Janice Betancourt sent at 2/5/2020  9:13 AM CST -----  Contact: Self  [phone]  Patient would like a call back about the type and name of the cancer her biopsy showed.

## 2020-02-06 ENCOUNTER — CLINICAL SUPPORT (OUTPATIENT)
Dept: REHABILITATION | Facility: HOSPITAL | Age: 85
End: 2020-02-06
Attending: INTERNAL MEDICINE
Payer: MEDICARE

## 2020-02-06 DIAGNOSIS — R26.81 UNSTEADY GAIT: ICD-10-CM

## 2020-02-06 PROCEDURE — 97110 THERAPEUTIC EXERCISES: CPT | Mod: PO

## 2020-02-06 PROCEDURE — 97530 THERAPEUTIC ACTIVITIES: CPT | Mod: PO

## 2020-02-06 NOTE — PROGRESS NOTES
[facility]  Physical Therapy Initial Evaluation    Name: Gerda Lai  Clinic Number: [id number]    Therapy Diagnosis:   Encounter Diagnosis   Name Primary?   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   M47.816 (ICD-10-CM) - Lumbar spondylosis   M47.812 (ICD-10-CM) - Cervical spondylosis   M17.0 (ICD-10-CM) - Osteoarthritis of both knees, unspecified osteoarthritis type       Evaluation Date: 2/6/2020  Authorization Period Expiration: 01/08/2021  Plan of Care Expiration: 3/20/2020  Visit # / Visits authorized: 1/ 1    Time In: 2:15  Time Out: 3:15  Total Billable Time: 60 minutes    Precautions: Standard and Fall    Subjective   Date of onset: Pt reported that she is feeling like she would like to work on her stability  History of current condition - Gerda reports: Pt has  A Hx of B rotator cuff tears, lumbar spondylosis, cervical spondylosis and B knee osteoarthritis.     Medical History:   Past Medical History:   Diagnosis Date    Arthritis     Basal cell carcinoma 09/2016    right post auricular neck     Breast cancer 1992    right    Cataract     Fibromyalgia     History of measles as a child     Hyperlipidemia     Hypertension     Personal history of colonic polyps     Pneumonia     SCC (squamous cell carcinoma) 2015    R chest    SCC (squamous cell carcinoma) 2016    left medial shoulder    SCC (squamous cell carcinoma) 2017    left knee    Shingles     Squamous cell carcinoma 2015    right forearm    Thyroid disease     Vaginitis        Surgical History:   Gerda Lai  has a past surgical history that includes thyriodectomy; Total hip arthroplasty; tonsillectomy; Adenoidectomy; Cataract extraction w/  intraocular lens implant (Bilateral); Yag  (Left); Breast lumpectomy (Right,  1992); Breast biopsy (Left); Eye surgery; Joint replacement; and Tonsillectomy.    Medications:   Gerda has a current medication list which includes the following prescription(s): acetaminophen, albuterol, alendronate, b infantis/b ani/b magali/b bifid, betamethasone valerate 0.1%, coenzyme q10, diclofenac sodium, felodipine, glucosamine-chondroitin, ipratropium, labetalol, lorazepam, multivitamin, omeprazole, polymyxin b sulf-trimethoprim, pravastatin, tekturna, and turmeric.    Allergies:   Review of patient's allergies indicates:   Allergen Reactions    Sulfa (sulfonamide antibiotics) Rash    Clarithromycin Other (See Comments)     Weak, extreme fatigue. dizziness    Flexeril [cyclobenzaprine] Other (See Comments)     Dizziness    Iodine and iodide containing products     Lisinopril Other (See Comments)     Cough and sensation of throat swelling/?angioedema    Losartan Rash    Metoprolol Swelling     Tightness in throat    Tramadol Other (See Comments)     Dizzy and weak    Verapamil (bulk) Palpitations    Voltaren [diclofenac sodium] Other (See Comments)     Drops blood pressure        Imaging, MRI studies:     Prior Therapy: yes  Social History: Pt lives in a single story home with one step into her house.  Pt lives alone  Occupation: retired  Prior Level of Function: pt has had difficulty getting around  Current Level of Function: Pt reports that she has daily pain I her neck with ADL's and feels as if she is not very steady on her feet    Pain:  Current 4/10, worst 10/10, best 1/10   Location: bilateral neck   Description: Aching  Aggravating Factors: moving her neck   Easing Factors: avoiding moving her neck into painfull positions.    Pts goals: Pt reported that she would like to feel more stable while she is walking    Objective   Observation: Pt was able to enter the clinic ambulating independently with a single point cane    Posture:  Forward head and rounded shoulders      Cervical Range of  Motion:    Degrees Pain   Flexion 45 yes     Extension 12 yes     Right Rotation nt nt     Left Rotation nt nt     Right Side Bending 27 yes   Left Side Bending 25 yes          Upper Extremity Strength not tested today        Balance Assessment:   TUG 21 seconds    DIOP Assessment    1. Sitting to Standing   3 - able to stand independely using hands  2. Standing Unsupported   4 - able to stand safely 2 minutes without hold  3. Sitting Unsupported   4 - able to sit safely and securely 2 minutes  4. Standing to Sitting   3 - controls descent by using hands  5. Pivot Transfer   3 - able to transfer safely with definite use of hands  6. Standing with Eyes Closed   3 - able to stand 10 seconds with supervision  7. Standing with Feet Together   4 - able to place feet together independently and stand 1 minute safely  8. Reaching Forward with Outstretched Arm   3 - can reach forward 12 cm/5 inches safely  9. Retrieving Object from Floor   3 - able to pick slipper but needs supervision  10. Turning to Look Behind   3 - looks behind one side only, other side less weight shift  11. Turning 360 Degrees   2 - able to turn 360 safely but slowly  12. Placing Alternate Foot on Step   0 - needs assist to keep from falling/unable to try  13. Standing with One Foot in Front   2 - able to take small step indenpendently and hold 30 seconds  14. Standing on One Foot   0 - unable to try or needs assistance to prevent fall  Total: 37  Maximum: 56    Sensation: decreased light touch sense in B hands      TREATMENT   Treatment Time In: 3:00  Treatment Time Out: 3:15  Total Treatment time separate from Evaluation: 15 minutes    Gerda received therapeutic exercises to develop strength, endurance, posture and core stabilization for 15 minutes including:  A review of her HEP with the addition seated marching    Home Exercises and Patient Education Provided    Education provided:   - yes    Written Home Exercises Provided: yes.  Exercises were  reviewed and Gerda was able to demonstrate them prior to the end of the session.  Gerda demonstrated good  understanding of the education provided.     See EMR under Patient Instructions for exercises provided 1/23/2020.    Assessment   Gerda is a 86 y.o. 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 Pt presents with limited Cx spine and B Shulder AROM and high risk for falls    Pt prognosis is Good.   Pt will benefit from skilled outpatient Physical Therapy to address the deficits stated above and in the chart below, provide pt/family education, and to maximize pt's level of independence.     Plan of care discussed with patient: Yes  Pt's spiritual, cultural and educational needs considered and patient is agreeable to the plan of care and goals as stated below:     Anticipated Barriers for therapy: none    Medical Necessity is demonstrated by the following  History  Co-morbidities and personal factors that may impact the plan of care Co-morbidities:   advanced age, history of cancer, HTN and Thyroid disease, B rotator cuff tears, O/A of B knees and high risk for falls    Personal Factors:   age     moderate   Examination  Body Structures and Functions, activity limitations and participation restrictions that may impact the plan of care Body Regions:   neck  lower extremities  upper extremities  trunk    Body Systems:    ROM  strength  balance  gait  transfers  blood pressure    Participation Restrictions:   Overhead workzz    Activity limitations:   Learning and applying knowledge  no deficits    General Tasks and Commands  no deficits    Communication  no deficits    Mobility  lifting and carrying  objects  walking    Self care  dressing    Domestic Life  doing house work (cleaning house, washing dishes, laundry)    Interactions/Relationships  no deficits    Life Areas  no deficits    Community and Social Life  no deficits         moderate   Clinical Presentation evolving clinical presentation with changing clinical characteristics moderate   Decision Making/ Complexity Score: moderate     Goals:  Short Term Goals: 4 weeks   Pt will be independent in a HEP to address their functional deficits related to balance   Pt will improve her TUG time to 18 seconds to show improvement in dynamic standing balance  Improve Garcia balance scale score to 40/56  to show improvement in dynamic standing balance     Long Term Goals: 8 weeks   Pt will improve her TUG time to 14 seconds to show improvement in dynamic standing balance  Improve Garcia balance scale score to 42/56  to show improvement in dynamic standing balance     Plan   Plan of care Certification: 2/6/2020 to 3/20/2020.    Outpatient Physical Therapy 2 times weekly for 8 weeks to include the following interventions: Gait Training, Manual Therapy, Moist Heat/ Ice, Neuromuscular Re-ed, Patient Education, Self Care, Therapeutic Activites, Therapeutic Exercise and modalities as indicated and Dry needling.     Ebenezer Sanchez, PT   Physical Therapy Daily Treatment Note     Name: Gerda Lai  Clinic Number: [id number]    Therapy Diagnosis: No diagnosis found.  Physician: Cesar Burroughs MD    Visit Date: 2/6/2020  Physician Orders: PT Eval and Treat   Medical Diagnosis from Referral:   M75.101,M12.811,M12.812,M75.102 (ICD-10-CM) - Rotator cuff tear arthropathy of both shoulders   M47.816 (ICD-10-CM) - Lumbar spondylosis   M47.812 (ICD-10-CM) - Cervical spondylosis   M17.0 (ICD-10-CM) - Osteoarthritis of both knees, unspecified osteoarthritis type         Evaluation Date: 1/23/2020  Authorization Period Expiration: 01/08/2021  Plan of Care Expiration: 3/20/2020  Visit #  / Visits authorized: 2/ 1     Time In: 4:00  Time Out: 4:45  Total Billable Time: 60 minutes     Precautions: Standard and Fall      Subjective     Pt reports: that she continues to have B shoulder and neck pain.  She was compliant with home exercise program.  Response to previous treatment: Pt reported that she was a little sore following her last Tx  Functional change: little change    Pain: 4/10  Location: bilateral neck      Objective       Gerda received therapeutic exercises to develop strength, endurance, posture and core stabilization for 15 minutes including:  A review of her HEP with the addition seated marching  HEP on her own  Chin tucks   Supine punches   AA B ROM into flexion  B hip add with ball  SLR flexion  dktc  Bridges  Clams  Stationary  Home Exercises Provided and Patient Education Provided     Education provided:   - yes    Written Home Exercises Provided: yes.  Exercises were reviewed and Gerda was able to demonstrate them prior to the end of the session.  Gerda demonstrated good  understanding of the education provided.     See EMR under Patient Instructions for exercises provided prior visit.    Assessment     Pt with continued neck and B shoulder pain  Gerda is progressing well towards her goals.   Pt prognosis is Fair.     Pt will continue to benefit from skilled outpatient physical therapy to address the deficits listed in the problem list box on initial evaluation, provide pt/family education and to maximize pt's level of independence in the home and community environment.     Pt's spiritual, cultural and educational needs considered and pt agreeable to plan of care and goals.     Anticipated barriers to physical therapy: none    Goals:  Short Term Goals: 4 weeks   Pt will be independent in a HEP to address their functional deficits related to balance   Pt will improve her TUG time to 18 seconds to show improvement in dynamic standing balance  Improve Garcia balance scale score to  40/56  to show improvement in dynamic standing balance      Long Term Goals: 8 weeks   Pt will improve her TUG time to 14 seconds to show improvement in dynamic standing balance  Improve Garcia balance scale score to 42/56  to show improvement in dynamic standing balance   Plan     Progress Tx to improve dynamic standing balance and decrease pain in her neck and B shoulders.    Ebenezer Sanchez, PT

## 2020-02-07 NOTE — PROGRESS NOTES
Subjective:      Patient ID: Gerda Lai is a 86 y.o. female.    Chief Complaint: Pain of the Right Hand and Pain of the Right Shoulder      HPI  Gerda Lai is a 86 y.o. female presenting today for right shoulder and hand pain. She had a fall about one year ago and believes she may have injured her shoulder at this time. It was not painful initially however pain has become more notable over the past 6 mos. She has recently completed physical therapy with some improvement. She has also had a prior injection with pain relief. In the right hand, she notes intermittent numbness and tingling. This is more noticeable when she awakes in the mornings.    Interval history: 3/26/19. Patient has been using the Voltaren gel on the wrist and shoulder. Also, right shoulder injected 2 months ago. And now, says her pain has nearly gone away entirely. She's ecstatic with the progress. She has resumed full activity.   Review of patient's allergies indicates:   Allergen Reactions    Sulfa (sulfonamide antibiotics) Rash    Clarithromycin Other (See Comments)     Weak, extreme fatigue. dizziness    Flexeril [cyclobenzaprine] Other (See Comments)     Dizziness    Iodine and iodide containing products     Lisinopril Other (See Comments)     Cough and sensation of throat swelling/?angioedema    Losartan Rash    Metoprolol Swelling     Tightness in throat    Spironolactone      Throat tightening    Tramadol Other (See Comments)     Dizzy and weak    Verapamil (bulk) Palpitations    Voltaren [diclofenac sodium] Other (See Comments)     Drops blood pressure         Current Outpatient Medications   Medication Sig Dispense Refill    acetaminophen (TYLENOL) 650 MG TbSR Take 650 mg by mouth as needed (pain).       ascorbic acid (VITAMIN C) 100 MG tablet Take 100 mg by mouth 2 (two) times daily.       B INFANTIS/B ANI/B JOSE/B BIFID (PROBIOTIC 4X ORAL) Take 1 tablet by mouth daily      coenzyme Q10 (CO Q-10) 100 mg capsule  Take 100 mg by mouth once daily.      diclofenac sodium (VOLTAREN) 1 % Gel       fluticasone (FLONASE) 50 mcg/actuation nasal spray SPRAY 1 SPRAY (50 MCG TOTAL) IN EACH NOSTRIL ONCE DAILY. 16 mL 0    glucosamine-chondroitin 500-400 mg tablet Take 1 tablet by mouth once daily.       ipratropium (ATROVENT) 0.03 % nasal spray 2 sprays by Nasal route 2 (two) times daily as needed. 30 mL 2    labetalol (NORMODYNE) 100 MG tablet TAKE 1 AND 1/2 TABLETS BY MOUTH TWICE A  tablet 1    levocetirizine (XYZAL) 5 MG tablet TAKE 1 TABLET BY MOUTH EVERY DAY IN THE EVENING 30 tablet 0    LORazepam (ATIVAN) 0.5 MG tablet TAKE ONE-HALF TABLET BY MOUTH TWICE A DAY 60 tablet 1    multivitamin capsule Take 1 capsule by mouth once daily.      omeprazole (PRILOSEC OTC) 20 MG tablet Take 20 mg by mouth once daily.        pravastatin (PRAVACHOL) 40 MG tablet TAKE 1 TABLET (40 MG TOTAL) BY MOUTH ONCE DAILY. 90 tablet 3    spironolactone (ALDACTONE) 50 MG tablet Take 1 tablet (50 mg total) by mouth once daily. 30 tablet 11    TEKTURNA 300 mg Tab Take 300 mg by mouth once daily.        No current facility-administered medications for this visit.        Past Medical History:   Diagnosis Date    Arthritis     Basal cell carcinoma 09/2016    right post auricular neck     Breast cancer 1992    right    Cataract     Fibromyalgia     History of measles as a child     Hyperlipidemia     Hypertension     Personal history of colonic polyps     Pneumonia     SCC (squamous cell carcinoma) 2015    R chest    SCC (squamous cell carcinoma) 2016    left medial shoulder    SCC (squamous cell carcinoma) 2017    left knee    Shingles     Squamous cell carcinoma 2015    right forearm    Thyroid disease     Vaginitis        Past Surgical History:   Procedure Laterality Date    ADENOIDECTOMY      BREAST BIOPSY Left     Excisional bx, benign    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 ([facility])    EYE SURGERY      JOINT REPLACEMENT      thyriodectomy      partial    tonsillectomy      TONSILLECTOMY      TOTAL HIP ARTHROPLASTY      right    Yag  Left        Review of Systems:  Constitutional: Negative for chills and fever.   Respiratory: Negative for cough and shortness of breath.    Gastrointestinal: Negative for nausea and vomiting.   Skin: Negative for rash.   Neurological: Negative for dizziness and headaches.   Psychiatric/Behavioral: Negative for depression.   MSK as in HPI       OBJECTIVE:     PHYSICAL EXAM:  BP (!) 144/56   Pulse (!) 59   Ht 5' 3" (1.6 m)   Wt 65.8 kg (145 lb)   BMI 25.69 kg/m²     GEN:  NAD, well-developed, well-groomed.  NEURO: Awake, alert, and oriented. Normal attention and concentration.    PSYCH: Normal mood and affect. Behavior is normal.  HEENT: No cervical lymphadenopathy noted.  CARDIOVASCULAR: Radial pulses 2+ bilaterally. No LE edema noted.  PULMONARY: Breath sounds normal. No respiratory distress.  SKIN: Intact, no rashes.      MSK:   RUE:  Good active ROM of the wrist and fingers. Multiple heberden's nodules noted on exam over the PIP/DIP joints She has fair shoulder motion. ttp over the anterior shoulder. negatie tinnels wrist and elbow. AIN/PIN/Radial/Median/Ulnar Nerves assessed in isolation without deficit. Radial & Ulnar arteries palpated 2+. Capillary Refill <3s.      RADIOGRAPHS:  Xray right shoulder 1/28/19  FINDINGS:  Skeletal structures show no acute fracture or dislocation.  Severe degenerative joint disease is again seen at the right shoulder with joint space narrowing, sclerosis, and marginal spurring.  Soft tissue calcifications near the inferior glenoid rim could be synovial osteochondromas.  Humeral head is again subluxed superiorly close to the acromion consistent with rotator cuff pathology.    Xray right hand 1/28/19  Impression   "   No acute abnormality.  Severe DJD right wrist and hand.     MRI right shoulder 12/26/18  Impression     1. Massive rotator cuff tear with moderate to severe muscle atrophy.  2. Severe glenohumeral osteoarthritis.  Glenohumeral effusion with synovitis.  3. Long head biceps tendon tear.  4. Severe acromioclavicular arthrosis.  5. Subacromial subdeltoid bursitis.     Comments: I have personally reviewed the imaging and I agree with the above radiologist's report.    ASSESSMENT/PLAN:     No diagnosis found.        Plan:   -treatment options discussed.   -Patient has had great success with the voltaren gel, applying it to her hand/wrist as well as her right shoulder. She is pleased with the progress. Continue the voltaren gel. Will follow up prn.          normal shape/ROM intact/strength intact

## 2020-02-10 ENCOUNTER — PATIENT OUTREACH (OUTPATIENT)
Dept: OTHER | Facility: OTHER | Age: 85
End: 2020-02-10

## 2020-02-10 ENCOUNTER — TELEPHONE (OUTPATIENT)
Dept: PRIMARY CARE CLINIC | Facility: CLINIC | Age: 85
End: 2020-02-10

## 2020-02-10 ENCOUNTER — TELEPHONE (OUTPATIENT)
Dept: DERMATOLOGY | Facility: CLINIC | Age: 85
End: 2020-02-10

## 2020-02-10 DIAGNOSIS — I10 ESSENTIAL HYPERTENSION: ICD-10-CM

## 2020-02-10 DIAGNOSIS — I10 HYPERTENSION, ESSENTIAL: ICD-10-CM

## 2020-02-10 RX ORDER — LABETALOL 100 MG/1
100 TABLET, FILM COATED ORAL EVERY 12 HOURS
Qty: 180 TABLET | Refills: 1
Start: 2020-02-10 | End: 2020-06-25

## 2020-02-10 RX ORDER — DOXAZOSIN 1 MG/1
1 TABLET ORAL NIGHTLY
Qty: 30 TABLET | Refills: 0 | Status: SHIPPED | OUTPATIENT
Start: 2020-02-10 | End: 2020-02-17 | Stop reason: DRUGHIGH

## 2020-02-10 RX ORDER — FELODIPINE 2.5 MG/1
2.5 TABLET, EXTENDED RELEASE ORAL DAILY
Qty: 30 TABLET | Refills: 1
Start: 2020-02-10 | End: 2020-03-17 | Stop reason: DRUGHIGH

## 2020-02-10 NOTE — TELEPHONE ENCOUNTER
----- Message from Mandi Ba sent at 2/10/2020 10:24 AM CST -----  Contact: Patient   Patient is calling because patient has Squamos cancer and Dr Lopez was supposed to schedule her with Dr. Benoit.  Patient calling to get this schedule.  Please contact patient at [phone]

## 2020-02-10 NOTE — TELEPHONE ENCOUNTER
EP with SCC on L lateral thigh, will schedule mohs surgery once photo and path is reviewed. Pt to email photo of leg.    Pt is now scheduled for mohs on 3/3 at 1 pm. Pt confirmed date, time, and location.

## 2020-02-10 NOTE — PROGRESS NOTES
"Digital Medicine: Clinician Follow-Up    Called patient for hypertension follow-up. "I think the current regimen that I am on is no longer working for me. The two labetalol are causing morning dizziness again and I feel like the felodipine is causing swelling in my ankle and feet. I know it is a derivative of the amlodipine and that one caused me to swell." She complains that she now feels a tightness in her throat especially when she eats meat. She also reports forgetfulness with taking her medications even though she is using a pill box. She is not always certain that her medication has been taken which leads to confusion where she doesn't take again or may take again. She cannot recall when her device was last charged but "feels that it's been a while." She has a new diagnosis of skin cancer on her leg which now has to be removed.     The history is provided by the patient. No  was used.     Follow Up  Follow-up reason(s): reading review and medication change      Readings are trending up due to medication adherence.    Medication Change: new med and dose decrease        INTERVENTION(S)  reviewed appropriate dose schedule, recommended med change and encouragement/support    PLAN  patient verbalizes understanding, patient amenable to changes and continue monitoring    Current 30-day BP avg of 174/70 is uncontrolled, does not meet goal of <150/90.  Reviewed readings: SBP is trending upwards and consistently elevated above goal. DBP is holding steady and well-controlled. Due to stories of forgetfulness, I question if patient is taking BP regimen daily as prescribed.   Discussed adequate water and caloric intake with patient in order to prevent s/sx of dizziness due to these reasons.    Gave patient the following plan:  1) Charge your blood pressure device today when you return home.  2) Measure your blood pressure after your device has been charged. Blood pressure can be measured 2 hours after " you take your medication.  3) Decrease felodipine to 2.5 mg daily (Take in the afternoon) due to complaint of ankle/feet swelling.  4) Decrease labetalol to 100 mg twice daily due to complaint of morning dizziness after taking.  5) Continue Tekturna once daily as prescribed.  6) Start doxazosin 1 mg once daily in the evening.  7) Be sure to drink adequate fluids and eat 3 meals daily to prevent symptoms due to dehydration and low blood glucose.     If any questions or concerns, feel free to call me in the office. Remember to use [facility] or 911 if you have an emergency and need help.    Follow-up in 1 week.      There are no preventive care reminders to display for this patient.    Last 5 Patient Entered Readings                                      Current 30 Day Average: 174/70     Recent Readings 2/8/2020 2/8/2020 2/2/2020 2/2/2020 1/25/2020    SBP (mmHg) 194 194 174 174 150    DBP (mmHg) 77 77 64 64 61    Pulse 57 57 65 65 72           Hypertension Medications             doxazosin (CARDURA) 1 MG tablet Take 1 tablet (1 mg total) by mouth every evening.    felodipine (PLENDIL) 2.5 MG Tb24 Take 1 tablet (2.5 mg total) by mouth once daily. Take in the afternoon.    labetalol (NORMODYNE) 100 MG tablet Take 1 tablet (100 mg total) by mouth every 12 (twelve) hours.    TEKTURNA 300 mg Tab TAKE 1 TABLET (300 MG TOTAL) BY MOUTH ONCE DAILY.                           Medication Affordability Screening    This is preventing medication adherence.

## 2020-02-10 NOTE — TELEPHONE ENCOUNTER
----- Message from Maria Esther Garibay sent at 2/10/2020 10:49 AM CST -----  Contact: pt  Pt would like a call back to schedule an appt. Pt would also like to schedule MOHS operation    Pt can be reached at [phone]

## 2020-02-11 ENCOUNTER — CLINICAL SUPPORT (OUTPATIENT)
Dept: REHABILITATION | Facility: HOSPITAL | Age: 85
End: 2020-02-11
Attending: INTERNAL MEDICINE
Payer: MEDICARE

## 2020-02-11 ENCOUNTER — PATIENT OUTREACH (OUTPATIENT)
Dept: OTHER | Facility: OTHER | Age: 85
End: 2020-02-11

## 2020-02-11 DIAGNOSIS — R26.81 UNSTEADY GAIT: ICD-10-CM

## 2020-02-11 PROCEDURE — 97140 MANUAL THERAPY 1/> REGIONS: CPT | Mod: PO

## 2020-02-11 PROCEDURE — 97110 THERAPEUTIC EXERCISES: CPT | Mod: PO

## 2020-02-11 NOTE — PROGRESS NOTES
Physical Therapy Daily Treatment Note     Name: Gerda Lai  Clinic Number: [id number]    Therapy Diagnosis:   Encounter Diagnosis   Name Primary?    Unsteady gait      Physician: Cesar Burroughs MD    Visit Date: 2/11/2020   Eval and Treat   Medical Diagnosis from Referral:   M75.101,M12.811,M12.812,M75.102 (ICD-10-CM) - Rotator cuff tear arthropathy of both shoulders   M47.816 (ICD-10-CM) - Lumbar spondylosis   M47.812 (ICD-10-CM) - Cervical spondylosis   M17.0 (ICD-10-CM) - Osteoarthritis of both knees, unspecified osteoarthritis type         Evaluation Date: 2/6/2020  Authorization Period Expiration: 01/08/2021  Plan of Care Expiration: 3/20/2020  Visit # / Visits authorized: 1/ 1     Time In: 2:15  Time Out: 3:15  Total Billable Time: 60 minutes     Precautions: Standard and Fall    Subjective     Pt reports: that L knee is feeling sore today.  She also reported that she has a skin cancer on her L thigh  She was compliant with home exercise program.  Response to previous treatment: Pt feel that she is getting more out of HEP that we reviewed  Functional change: Pt reported that she feels as if she is walking better     Pain: 4/10  Location: left neck      Objective       R wrist flexion weak  R elbow extension weak  Decreased light touch sense in R C6 dermatome  Gerda received therapeutic exercises to develop strength, endurance and core stabilization for 35 minutes including:  Scapular retractions x 10  Cx spine rotation in supine 5 x 2  B shoulder pro/retraction 20 x 3  Standing rows 10 x 3 with orange TB  Standing B shoulder extension 10 x 3 with orange TB    Gerda received the following manual therapy techniques: Joint mobilizations, Manual traction and Soft tissue Mobilization were applied to the: Cx and thoracic spine for 20 minutes, including:  Manual Cx traction  Passive mobilization Thoracic spine and rib cage  Soft tissue mobilization thoracic paraspinals and posterior Cx spine  musculature.      Home Exercises Provided and Patient Education Provided     Education provided:   - yes    Written Home Exercises Provided: yes.  Exercises were reviewed and Gerda was able to demonstrate them prior to the end of the session.  Gerda demonstrated good  understanding of the education provided.     See EMR under Patient Instructions for exercises provided prior visit.    Assessment     Pt reported decreased R UE Sx with Cx traction  Gerda is progressing well towards her goals.   Pt prognosis is Fair.     Pt will continue to benefit from skilled outpatient physical therapy to address the deficits listed in the problem list box on initial evaluation, provide pt/family education and to maximize pt's level of independence in the home and community environment.     Pt's spiritual, cultural and educational needs considered and pt agreeable to plan of care and goals.     Anticipated barriers to physical therapy: none    Goals:   Short Term Goals: 4 weeks   Pt will be independent in a HEP to address their functional deficits related to balance   Pt will improve her TUG time to 18 seconds to show improvement in dynamic standing balance  Improve Garcia balance scale score to 40/56  to show improvement in dynamic standing balance      Long Term Goals: 8 weeks   Pt will improve her TUG time to 14 seconds to show improvement in dynamic standing balance  Improve Garcia balance scale score to 42/56  to show improvement in dynamic standing balance   Plan     Progress Tx to improve dynamic standing balance and assist in managing neck and B shoulder pain to improve B UE function.    Ebenezer Sanchez, PT

## 2020-02-11 NOTE — PROGRESS NOTES
"Digital Medicine: Clinician Follow-Up    Gerda Lai submitted a reading of 195/79 at 2/10/2020 11:27 PM. Called patient due to high BP alert. Ms. Lorenz reports that elevated reading recorded last night was without her blood pressure medication on board. She denies any s/sx of hypertension - no CP, SOB, severe headache or changes in vision. She took medication after the reading. She charged her device on yesterday as discussed, but states "It never stop blinking even after 2 hours so I unplugged it." She also reports starting doxazosin last night. Today is her Fosamax dose day. She has not had her blood pressure medications yet this morning and plans to take them shortly. She will measure blood pressure again today later today.     The history is provided by the patient. No  was used.     Follow Up  Follow-up reason(s): reading review      Alert received.   Care Team received high BP alert.  Patient is not experiencing symptoms.  Reading was invalid because Medication not on board      INTERVENTION(S)  encouragement/support    PLAN  patient verbalizes understanding and continue monitoring    Continue current regimen.  Advised patient to plug machine back up to complete battery charge.   Take BP today 2-hours after BP medication is taken.    Follow-up in one week.          There are no preventive care reminders to display for this patient.    Last 5 Patient Entered Readings                                      Current 30 Day Average: 177/71     Recent Readings 2/10/2020 2/10/2020 2/8/2020 2/8/2020 2/2/2020    SBP (mmHg) 195 195 194 194 174    DBP (mmHg) 79 79 77 77 64    Pulse 67 67 57 57 65             Hypertension Medications             doxazosin (CARDURA) 1 MG tablet Take 1 tablet (1 mg total) by mouth every evening.    felodipine (PLENDIL) 2.5 MG Tb24 Take 1 tablet (2.5 mg total) by mouth once daily. Take in the afternoon.    labetalol (NORMODYNE) 100 MG tablet Take 1 tablet (100 mg " total) by mouth every 12 (twelve) hours.    TEKTURNA 300 mg Tab TAKE 1 TABLET (300 MG TOTAL) BY MOUTH ONCE DAILY.                 Screenings

## 2020-02-11 NOTE — PROGRESS NOTES
[facility]  Physical Therapy Initial Evaluation    Name: Gerda Lai  Clinic Number: [id number]    Therapy Diagnosis:   Encounter Diagnosis   Name Primary?   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   M47.816 (ICD-10-CM) - Lumbar spondylosis   M47.812 (ICD-10-CM) - Cervical spondylosis   M17.0 (ICD-10-CM) - Osteoarthritis of both knees, unspecified osteoarthritis type       Evaluation Date: 2/11/2020  Authorization Period Expiration: 01/08/2021  Plan of Care Expiration: 3/20/2020  Visit # / Visits authorized: 1/ 1    Time In: 2:15  Time Out: 3:15  Total Billable Time: 60 minutes    Precautions: Standard and Fall    Subjective   Date of onset: Pt reported that she is feeling like she would like to work on her stability  History of current condition - Gerda reports: Pt has  A Hx of B rotator cuff tears, lumbar spondylosis, cervical spondylosis and B knee osteoarthritis.     Medical History:   Past Medical History:   Diagnosis Date    Arthritis     Basal cell carcinoma 09/2016    right post auricular neck     Breast cancer 1992    right    Cataract     Fibromyalgia     History of measles as a child     Hyperlipidemia     Hypertension     Personal history of colonic polyps     Pneumonia     SCC (squamous cell carcinoma) 2015    R chest    SCC (squamous cell carcinoma) 2016    left medial shoulder    SCC (squamous cell carcinoma) 2017    left knee    Shingles     Squamous cell carcinoma 2015    right forearm    Thyroid disease     Vaginitis        Surgical History:   Gerda Lai  has a past surgical history that includes thyriodectomy; Total hip arthroplasty; tonsillectomy; Adenoidectomy; Cataract extraction w/  intraocular lens implant (Bilateral); Yag  (Left); Breast lumpectomy (Right,  1992); Breast biopsy (Left); Eye surgery; Joint replacement; and Tonsillectomy.    Medications:   Gerda has a current medication list which includes the following prescription(s): acetaminophen, albuterol, alendronate, b infantis/b ani/b magali/b bifid, betamethasone valerate 0.1%, coenzyme q10, diclofenac sodium, doxazosin, felodipine, glucosamine-chondroitin, ipratropium, labetalol, lorazepam, multivitamin, omeprazole, polymyxin b sulf-trimethoprim, pravastatin, tekturna, and turmeric.    Allergies:   Review of patient's allergies indicates:   Allergen Reactions    Sulfa (sulfonamide antibiotics) Rash    Clarithromycin Other (See Comments)     Weak, extreme fatigue. dizziness    Flexeril [cyclobenzaprine] Other (See Comments)     Dizziness    Iodine and iodide containing products     Lisinopril Other (See Comments)     Cough and sensation of throat swelling/?angioedema    Losartan Rash    Metoprolol Swelling     Tightness in throat    Tramadol Other (See Comments)     Dizzy and weak    Verapamil (bulk) Palpitations    Voltaren [diclofenac sodium] Other (See Comments)     Drops blood pressure        Imaging, MRI studies:     Prior Therapy: yes  Social History: Pt lives in a single story home with one step into her house.  Pt lives alone  Occupation: retired  Prior Level of Function: pt has had difficulty getting around  Current Level of Function: Pt reports that she has daily pain I her neck with ADL's and feels as if she is not very steady on her feet    Pain:  Current 4/10, worst 10/10, best 1/10   Location: bilateral neck   Description: Aching  Aggravating Factors: moving her neck   Easing Factors: avoiding moving her neck into painfull positions.    Pts goals: Pt reported that she would like to feel more stable while she is walking    Objective   Observation: Pt was able to enter the clinic ambulating independently with a single point cane    Posture:  Forward head and rounded shoulders      Cervical  Range of Motion:    Degrees Pain   Flexion 45 yes     Extension 12 yes     Right Rotation nt nt     Left Rotation nt nt     Right Side Bending 27 yes   Left Side Bending 25 yes          Upper Extremity Strength not tested today        Balance Assessment:   TUG 21 seconds    DIOP Assessment    1. Sitting to Standing   3 - able to stand independely using hands  2. Standing Unsupported   4 - able to stand safely 2 minutes without hold  3. Sitting Unsupported   4 - able to sit safely and securely 2 minutes  4. Standing to Sitting   3 - controls descent by using hands  5. Pivot Transfer   3 - able to transfer safely with definite use of hands  6. Standing with Eyes Closed   3 - able to stand 10 seconds with supervision  7. Standing with Feet Together   4 - able to place feet together independently and stand 1 minute safely  8. Reaching Forward with Outstretched Arm   3 - can reach forward 12 cm/5 inches safely  9. Retrieving Object from Floor   3 - able to pick slipper but needs supervision  10. Turning to Look Behind   3 - looks behind one side only, other side less weight shift  11. Turning 360 Degrees   2 - able to turn 360 safely but slowly  12. Placing Alternate Foot on Step   0 - needs assist to keep from falling/unable to try  13. Standing with One Foot in Front   2 - able to take small step indenpendently and hold 30 seconds  14. Standing on One Foot   0 - unable to try or needs assistance to prevent fall  Total: 37  Maximum: 56    Sensation: decreased light touch sense in B hands      TREATMENT   Treatment Time In: 3:00  Treatment Time Out: 3:15  Total Treatment time separate from Evaluation: 15 minutes    Gerda received therapeutic exercises to develop strength, endurance, posture and core stabilization for 15 minutes including:  A review of her HEP with the addition seated marching    Home Exercises and Patient Education Provided    Education provided:   - yes    Written Home Exercises Provided:  yes.  Exercises were reviewed and Gerda was able to demonstrate them prior to the end of the session.  Gerda demonstrated good  understanding of the education provided.     See EMR under Patient Instructions for exercises provided 1/23/2020.    Assessment   Gerda is a 86 y.o. 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 Pt presents with limited Cx spine and B Shulder AROM and high risk for falls    Pt prognosis is Good.   Pt will benefit from skilled outpatient Physical Therapy to address the deficits stated above and in the chart below, provide pt/family education, and to maximize pt's level of independence.     Plan of care discussed with patient: Yes  Pt's spiritual, cultural and educational needs considered and patient is agreeable to the plan of care and goals as stated below:     Anticipated Barriers for therapy: none    Medical Necessity is demonstrated by the following  History  Co-morbidities and personal factors that may impact the plan of care Co-morbidities:   advanced age, history of cancer, HTN and Thyroid disease, B rotator cuff tears, O/A of B knees and high risk for falls    Personal Factors:   age     moderate   Examination  Body Structures and Functions, activity limitations and participation restrictions that may impact the plan of care Body Regions:   neck  lower extremities  upper extremities  trunk    Body Systems:    ROM  strength  balance  gait  transfers  blood pressure    Participation Restrictions:   Overhead workzz    Activity limitations:   Learning and applying knowledge  no deficits    General Tasks and Commands  no deficits    Communication  no deficits    Mobility  lifting and  carrying objects  walking    Self care  dressing    Domestic Life  doing house work (cleaning house, washing dishes, laundry)    Interactions/Relationships  no deficits    Life Areas  no deficits    Community and Social Life  no deficits         moderate   Clinical Presentation evolving clinical presentation with changing clinical characteristics moderate   Decision Making/ Complexity Score: moderate     Goals:  Short Term Goals: 4 weeks   Pt will be independent in a HEP to address their functional deficits related to balance   Pt will improve her TUG time to 18 seconds to show improvement in dynamic standing balance  Improve Garcia balance scale score to 40/56  to show improvement in dynamic standing balance     Long Term Goals: 8 weeks   Pt will improve her TUG time to 14 seconds to show improvement in dynamic standing balance  Improve Garcia balance scale score to 42/56  to show improvement in dynamic standing balance     Plan   Plan of care Certification: 2/11/2020 to 3/20/2020.    Outpatient Physical Therapy 2 times weekly for 8 weeks to include the following interventions: Gait Training, Manual Therapy, Moist Heat/ Ice, Neuromuscular Re-ed, Patient Education, Self Care, Therapeutic Activites, Therapeutic Exercise and modalities as indicated and Dry needling.     Ebenezer Sanchez, PT   Physical Therapy Daily Treatment Note     Name: Gerda DANIELS Ming  Clinic Number: [id number]    Therapy Diagnosis:   Encounter Diagnosis   Name Primary?    Unsteady gait      Physician: Cesar Burroughs MD    Visit Date: 2/11/2020  Physician Orders: PT Eval and Treat   Medical Diagnosis from Referral:   M75.101,M12.811,M12.812,M75.102 (ICD-10-CM) - Rotator cuff tear arthropathy of both shoulders   M47.816 (ICD-10-CM) - Lumbar spondylosis   M47.812 (ICD-10-CM) - Cervical spondylosis   M17.0 (ICD-10-CM) - Osteoarthritis of both knees, unspecified osteoarthritis type         Evaluation Date: 1/23/2020  Authorization Period Expiration:  01/08/2021  Plan of Care Expiration: 3/20/2020  Visit # / Visits authorized: 2/ 1     Time In: 1:00  Time Out: 1:45  Total Billable Time: 60 minutes     Precautions: Standard and Fall      Subjective     Pt reports: that she continues to have B shoulder and neck pain.  She was compliant with home exercise program.  Response to previous treatment: Pt reported that she was a little sore following her last Tx  Functional change: little change    Pain: 4/10  Location: bilateral neck      Objective       Gerda received therapeutic exercises to develop strength, endurance, posture and core stabilization for 15 minutes including:  A review of her HEP with the addition seated marching  HEP on her own  Chin tucks   Supine punches   AA B ROM into flexion  B hip add with ball  SLR flexion  dktc  Bridges  Clams  Stationary  Home Exercises Provided and Patient Education Provided     Education provided:   - yes    Written Home Exercises Provided: yes.  Exercises were reviewed and Gerda was able to demonstrate them prior to the end of the session.  Gerda demonstrated good  understanding of the education provided.     See EMR under Patient Instructions for exercises provided prior visit.    Assessment     Pt with continued neck and B shoulder pain  Gerda is progressing well towards her goals.   Pt prognosis is Fair.     Pt will continue to benefit from skilled outpatient physical therapy to address the deficits listed in the problem list box on initial evaluation, provide pt/family education and to maximize pt's level of independence in the home and community environment.     Pt's spiritual, cultural and educational needs considered and pt agreeable to plan of care and goals.     Anticipated barriers to physical therapy: none    Goals:  Short Term Goals: 4 weeks   Pt will be independent in a HEP to address their functional deficits related to balance   Pt will improve her TUG time to 18 seconds to show improvement in dynamic  standing balance  Improve Garcia balance scale score to 40/56  to show improvement in dynamic standing balance      Long Term Goals: 8 weeks   Pt will improve her TUG time to 14 seconds to show improvement in dynamic standing balance  Improve Garcia balance scale score to 42/56  to show improvement in dynamic standing balance   Plan     Progress Tx to improve dynamic standing balance and decrease pain in her neck and B shoulders.    Ebenezer Sanchez, PT

## 2020-02-13 ENCOUNTER — CLINICAL SUPPORT (OUTPATIENT)
Dept: REHABILITATION | Facility: HOSPITAL | Age: 85
End: 2020-02-13
Attending: INTERNAL MEDICINE
Payer: MEDICARE

## 2020-02-13 DIAGNOSIS — R26.81 UNSTEADY GAIT: ICD-10-CM

## 2020-02-13 PROCEDURE — 97110 THERAPEUTIC EXERCISES: CPT | Mod: PO

## 2020-02-13 PROCEDURE — 97140 MANUAL THERAPY 1/> REGIONS: CPT | Mod: PO

## 2020-02-13 NOTE — PROGRESS NOTES
Physical Therapy Daily Treatment Note     Name: Gerda Lai  Clinic Number: [id number]    Therapy Diagnosis:   Encounter Diagnosis   Name Primary?    Unsteady gait      Physician: Cesar Burroughs MD    Visit Date: 2/13/2020   Eval and Treat   Medical Diagnosis from Referral:   M75.101,M12.811,M12.812,M75.102 (ICD-10-CM) - Rotator cuff tear arthropathy of both shoulders   M47.816 (ICD-10-CM) - Lumbar spondylosis   M47.812 (ICD-10-CM) - Cervical spondylosis   M17.0 (ICD-10-CM) - Osteoarthritis of both knees, unspecified osteoarthritis type         Evaluation Date: 2/6/2020  Authorization Period Expiration: 01/08/2021  Plan of Care Expiration: 3/20/2020  Visit # / Visits authorized: 1/ 1     Time In: 1:00  Time Out: 2:00  Total Billable Time: 60 minutes     Precautions: Standard and Fall    Subjective     Pt reports: that her Dermatologist would like to schedule her on the same day that she has a PT appointment, so she would like to change her PT appointment.  She was compliant with home exercise program.  Response to previous treatment: Pt feel that she is getting more out of HEP that we reviewed  Functional change: Pt reported that she feels as if she is walking better     Pain: 4/10  Location: left side of her neck      Objective       Gerda received therapeutic exercises to develop strength, endurance, ROM, posture and core stabilization for 30 minutes including:  Standing rows 10 x 3 with orange TB  Standing B shoulder extension 10 x 3 with orange TB  B shoulder protraction/retraction 20 x 2 with ball on plinth  B shoulder horizontal abd/add 20 x 2 with ball on plinth      Gerda received the following manual therapy techniques: Joint mobilizations, Manual traction and Soft tissue Mobilization were applied to the: Cx and thoracic spine and rib cage for 30 minutes, including:  Manual Cx traction  Thoracic spine and rib cage passive mobilization   Soft tissue mobilization Cx and thoracic spine.    Home  Exercises Provided and Patient Education Provided     Education provided:   - yes    Written Home Exercises Provided: yes.  Exercises were reviewed and Gerda was able to demonstrate them prior to the end of the session.  Gerda demonstrated good  understanding of the education provided.     See EMR under Patient Instructions for exercises provided prior visit.    Assessment     Pt tolerated manual Cx traction well with a reported decrease in her Sx  Gerda is progressing well towards her goals.   Pt prognosis is Good.     Pt will continue to benefit from skilled outpatient physical therapy to address the deficits listed in the problem list box on initial evaluation, provide pt/family education and to maximize pt's level of independence in the home and community environment.     Pt's spiritual, cultural and educational needs considered and pt agreeable to plan of care and goals.     Anticipated barriers to physical therapy: none    Goals:   Short Term Goals: 4 weeks   Pt will be independent in a HEP to address their functional deficits related to balance   Pt will improve her TUG time to 18 seconds to show improvement in dynamic standing balance  Improve Garcia balance scale score to 40/56  to show improvement in dynamic standing balance      Long Term Goals: 8 weeks   Pt will improve her TUG time to 14 seconds to show improvement in dynamic standing balance  Improve Garcia balance scale score to 42/56  to show improvement in dynamic standing balance   Plan     Progress Tx to assist in improving Pt's functional abilities     Ebenezer Sanchez, PT

## 2020-02-17 ENCOUNTER — PATIENT OUTREACH (OUTPATIENT)
Dept: OTHER | Facility: OTHER | Age: 85
End: 2020-02-17

## 2020-02-17 DIAGNOSIS — I10 HYPERTENSION, ESSENTIAL: ICD-10-CM

## 2020-02-17 RX ORDER — DOXAZOSIN 1 MG/1
2 TABLET ORAL NIGHTLY
Qty: 30 TABLET | Refills: 0 | Status: SHIPPED | OUTPATIENT
Start: 2020-02-17 | End: 2020-02-18

## 2020-02-17 NOTE — PROGRESS NOTES
Digital Medicine: Clinician Follow-Up    Called patient for hypertension follow-up. Ms. Lorenz reports that she is doing well. She is continuing to go to physical therapy for concerns with her unsteady gait and tore rotator cuff. She feels that PT is going well. She apologizes for not taking a reading in several days. She reports having issues with her phone last week and she has not tried to use the olimpia again since then. She thinks that she is doing well in spite of the readings being higher than we would like. She reports having charged her device the last time we spoke.     The history is provided by the patient. No  was used.     Follow Up  Follow-up reason(s): reading review and medication change      Readings are trending up   Medication Change: dose increase        INTERVENTION(S)  reviewed appropriate dose schedule, recommended med change and encouragement/support    PLAN  patient verbalizes understanding, patient amenable to changes and continue monitoring    Current 30-day BP avg of 179/71 is uncontrolled, does not meet goal of <150/90.  Reviewed readings: DBP is well-controlled and meeting goal. SBP is consistently uncontrolled and trending upwards.   Discussed BP goal of <150/90. Goal is to get and keep SBP in 150's.    Increase doxazosin to 2 mg each evening before bedtime.  Continue labetalol, Tekturna and felodipine as prescribed.   Continue measuring BP regularly for monitoring.   Sent message to PT Ebenezer requesting BP measure at each PT visit for additional data to reference since patient visits PT several times a month.     Follow-up in 1 week.       There are no preventive care reminders to display for this patient.    Last 5 Patient Entered Readings                                      Current 30 Day Average: 179/71     Recent Readings 2/13/2020 2/13/2020 2/11/2020 2/11/2020 2/10/2020    SBP (mmHg) 189 189 181 181 195    DBP (mmHg) 70 70 66 66 79    Pulse 67 67 66 66 67            Hypertension Medications             doxazosin (CARDURA) 1 MG tablet Take 2 tablets (2 mg total) by mouth every evening.    felodipine (PLENDIL) 2.5 MG Tb24 Take 1 tablet (2.5 mg total) by mouth once daily. Take in the afternoon.    labetalol (NORMODYNE) 100 MG tablet Take 1 tablet (100 mg total) by mouth every 12 (twelve) hours.    TEKTURNA 300 mg Tab TAKE 1 TABLET (300 MG TOTAL) BY MOUTH ONCE DAILY.                 Screenings

## 2020-02-17 NOTE — PROGRESS NOTES
"Digital Medicine: Clinician Follow-Up    Called patient for hypertension follow-up. "I think I feel better in spite of the number being high. I don't feel bad anymore with the one labetalol."    The history is provided by the patient. No  was used.       Intervention/Plan    There are no preventive care reminders to display for this patient.    Last 5 Patient Entered Readings                                      Current 30 Day Average: 179/71     Recent Readings 2/13/2020 2/13/2020 2/11/2020 2/11/2020 2/10/2020    SBP (mmHg) 189 189 181 181 195    DBP (mmHg) 70 70 66 66 79    Pulse 67 67 66 66 67             Hypertension Medications             doxazosin (CARDURA) 1 MG tablet Take 1 tablet (1 mg total) by mouth every evening.    felodipine (PLENDIL) 2.5 MG Tb24 Take 1 tablet (2.5 mg total) by mouth once daily. Take in the afternoon.    labetalol (NORMODYNE) 100 MG tablet Take 1 tablet (100 mg total) by mouth every 12 (twelve) hours.    TEKTURNA 300 mg Tab TAKE 1 TABLET (300 MG TOTAL) BY MOUTH ONCE DAILY.                 Screenings  "

## 2020-02-18 ENCOUNTER — CLINICAL SUPPORT (OUTPATIENT)
Dept: REHABILITATION | Facility: HOSPITAL | Age: 85
End: 2020-02-18
Attending: INTERNAL MEDICINE
Payer: MEDICARE

## 2020-02-18 ENCOUNTER — PATIENT OUTREACH (OUTPATIENT)
Dept: OTHER | Facility: OTHER | Age: 85
End: 2020-02-18

## 2020-02-18 DIAGNOSIS — I10 HYPERTENSION, ESSENTIAL: ICD-10-CM

## 2020-02-18 DIAGNOSIS — R26.81 UNSTEADY GAIT: ICD-10-CM

## 2020-02-18 RX ORDER — DOXAZOSIN 1 MG/1
1 TABLET ORAL NIGHTLY
Qty: 30 TABLET | Refills: 0 | Status: SHIPPED | OUTPATIENT
Start: 2020-02-18 | End: 2020-04-06

## 2020-02-18 NOTE — PROGRESS NOTES
"Digital Medicine: Clinician Follow-Up    Patient called to complain that she is having a difficulty walking, her mouth "feels funny" and she has a pain in her back. She reports taking doxazosin 2 mg last night for better blood pressure control as discussed on yesterday. This morning she has the complaint about the increased doxazosin. "Should I take my Fosamax and other medications this morning? She is having difficulty with her i[employer] device connecting to the olimpia to measure her blood pressure. She states that olimpia just keeps saying connecting and circling.    The history is provided by the patient. No  was used.     Follow Up  Follow-up reason(s): medication change, medication change follow-up and routine education      Medication Change: dose decrease    Is patient tolerating med change?:  No  Reasons:  She has multiple complaints about taking doxazosin 2 mg      INTERVENTION(S)  recommended med change and encouragement/support    PLAN  patient verbalizes understanding, patient amenable to changes and continue monitoring    Managing Ms. Bae blood pressure continues to be a difficult task due to numerous complaints and/or allergies to medications prescribed. She seems happiest with blood pressure in the 170-180's. She often complains of side effects with dose increases. She wants to be on the least amount of medication possible but her blood pressure is poorly controlled in doing so and often results in readings >190.    No complaints at doxazosin 1 mg. Instructed patient to go back to doxazosin 1 mg daily.   Continue all other medications as prescribed.   Requested patient measure BP for monitoring.   Submitted CRM ticket for technical support.    Follow-up in 1 week.      There are no preventive care reminders to display for this patient.    Last 5 Patient Entered Readings                                      Current 30 Day Average: 179/71     Recent Readings 2/13/2020 2/13/2020 2/11/2020 " 2/11/2020 2/10/2020    SBP (mmHg) 189 189 181 181 195    DBP (mmHg) 70 70 66 66 79    Pulse 67 67 66 66 67             Hypertension Medications             doxazosin (CARDURA) 1 MG tablet Take 2 tablets (2 mg total) by mouth every evening.    felodipine (PLENDIL) 2.5 MG Tb24 Take 1 tablet (2.5 mg total) by mouth once daily. Take in the afternoon.    labetalol (NORMODYNE) 100 MG tablet Take 1 tablet (100 mg total) by mouth every 12 (twelve) hours.    TEKTURNA 300 mg Tab TAKE 1 TABLET (300 MG TOTAL) BY MOUTH ONCE DAILY.                 Screenings

## 2020-02-18 NOTE — PROGRESS NOTES
Physical Therapy Daily Treatment Note     Name: Gerda Lai  Clinic Number: [id number]    Therapy Diagnosis:   Encounter Diagnosis   Name Primary?    Unsteady gait      Physician: Cesar Burroughs MD    Visit Date: 2/18/2020   Eval and Treat   Medical Diagnosis from Referral:   M75.101,M12.811,M12.812,M75.102 (ICD-10-CM) - Rotator cuff tear arthropathy of both shoulders   M47.816 (ICD-10-CM) - Lumbar spondylosis   M47.812 (ICD-10-CM) - Cervical spondylosis   M17.0 (ICD-10-CM) - Osteoarthritis of both knees, unspecified osteoarthritis type         Evaluation Date: 2/6/2020  Authorization Period Expiration: 01/08/2021  Plan of Care Expiration: 3/20/2020  Visit # / Visits authorized: 1/ 1     Time In: 1:00  Time Out: 1:45  Total Billable Time: 00 minutes     Precautions: Standard and Fall    Subjective     Pt reports: that she is not feeling well and that her blood pressure medication was changed yesterday.  She was compliant with home exercise program.  Response to previous treatment: Pt feel that she is getting more out of HEP that we reviewed  Functional change: Pt reported that she feels as if she is walking better     Pain: 4/10  Location: left side of her neck      Objective     /86  HR 60  Pt did not receive Tx today as her blood pressure was high and she was feeling lightheaded.         Not performed today due pat not feelkng well and   Gerda received therapeutic exercises to develop strength, endurance, ROM, posture and core stabilization for 30 minutes including:  Standing rows 10 x 3 with orange TB  Standing B shoulder extension 10 x 3 with orange TB  B shoulder protraction/retraction 20 x 2 with ball on plinth  B shoulder horizontal abd/add 20 x 2 with ball on plinth      Gerda received the following manual therapy techniques: Joint mobilizations, Manual traction and Soft tissue Mobilization were applied to the: Cx and thoracic spine and rib cage for 30 minutes, including:  Manual Cx  traction  Thoracic spine and rib cage passive mobilization   Soft tissue mobilization Cx and thoracic spine.    Home Exercises Provided and Patient Education Provided     Education provided:   - yes    Written Home Exercises Provided: yes.  Exercises were reviewed and Gerda was able to demonstrate them prior to the end of the session.  Gerda demonstrated good  understanding of the education provided.     See EMR under Patient Instructions for exercises provided prior visit.    Assessment     Pt did not receive Tx due to high blood pressure today.  Gerda is progressing well towards her goals.   Pt prognosis is Good.     Pt will continue to benefit from skilled outpatient physical therapy to address the deficits listed in the problem list box on initial evaluation, provide pt/family education and to maximize pt's level of independence in the home and community environment.     Pt's spiritual, cultural and educational needs considered and pt agreeable to plan of care and goals.     Anticipated barriers to physical therapy: none    Goals:   Short Term Goals: 4 weeks   Pt will be independent in a HEP to address their functional deficits related to balance   Pt will improve her TUG time to 18 seconds to show improvement in dynamic standing balance  Improve Garcia balance scale score to 40/56  to show improvement in dynamic standing balance      Long Term Goals: 8 weeks   Pt will improve her TUG time to 14 seconds to show improvement in dynamic standing balance  Improve Garcia balance scale score to 42/56  to show improvement in dynamic standing balance   Plan     Progress Tx to assist in improving Pt's functional abilities     Ebenezer Sanchez, PT

## 2020-02-20 ENCOUNTER — PATIENT OUTREACH (OUTPATIENT)
Dept: OTHER | Facility: OTHER | Age: 85
End: 2020-02-20

## 2020-02-20 NOTE — PROGRESS NOTES
Digital Medicine: Clinician Follow-Up    Gerda Lai submitted a reading of 203/81 at 2/20/2020  3:39 PM. Called patient for hypertension follow-up due to high alert. She denies s/sx of hypertension - no CP, SOB, blurred vision or severe headaches.     The history is provided by the patient. No  was used.     Follow Up  Follow-up reason(s): reading review and medication change      Alert received.   Care Team received high BP alert.  Patient is not experiencing symptoms.  Reading was invalid because Took reading while talking on phone to tech support  Medication Change: dose increase        INTERVENTION(S)  recommended med change and encouragement/support    PLAN  patient verbalizes understanding, patient amenable to changes and continue monitoring    Although this elevated reading may have been due to being on phone when she measured, BP has continued to be difficult to manage SBP avg 180-190's. Patient willing to change medications for better BP control.    Increase labetalol to 200 mg in the evening and continue 100 mg in the morning.   Continue all other medications as prescribed.     Follow-up in 1 week.       There are no preventive care reminders to display for this patient.    Last 5 Patient Entered Readings                                      Current 30 Day Average: 182/74     Recent Readings 2/20/2020 2/20/2020 2/18/2020 2/18/2020 2/18/2020    SBP (mmHg) 203 203 186 186 186    DBP (mmHg) 81 81 86 86 86    Pulse 58 58 60 60 60             Hypertension Medications             doxazosin (CARDURA) 1 MG tablet Take 1 tablet (1 mg total) by mouth every evening.    felodipine (PLENDIL) 2.5 MG Tb24 Take 1 tablet (2.5 mg total) by mouth once daily. Take in the afternoon.    labetalol (NORMODYNE) 100 MG tablet Take 1 tablet (100 mg total) by mouth every 12 (twelve) hours.    TEKTURNA 300 mg Tab TAKE 1 TABLET (300 MG TOTAL) BY MOUTH ONCE DAILY.                 Screenings

## 2020-02-21 DIAGNOSIS — E78.5 HYPERLIPIDEMIA: ICD-10-CM

## 2020-02-21 RX ORDER — PRAVASTATIN SODIUM 40 MG/1
TABLET ORAL
Qty: 90 TABLET | Refills: 3 | Status: SHIPPED | OUTPATIENT
Start: 2020-02-21 | End: 2020-07-06 | Stop reason: SDUPTHER

## 2020-02-24 ENCOUNTER — PATIENT OUTREACH (OUTPATIENT)
Dept: OTHER | Facility: OTHER | Age: 85
End: 2020-02-24

## 2020-02-24 NOTE — PROGRESS NOTES
Digital Medicine: Clinician Follow-Up    Patient called to report that she was not feeling well over the weekend. She was experiencing diarrhea and attributes this to the doxazosin. She reports feeling nauseated but did not vomit. She attributes these symptoms to the doxazosin. She also reports that blood pressure was 194/72 on yesterday but reading did not crossover into [employer] database. She believes she measured it with [employer] iHealth device. Patient is asking for instructions on what to do.     The history is provided by the patient. No  was used.     Follow Up  Follow-up reason(s): reading review      Readings are trending up       INTERVENTION(S)  encouragement/support    PLAN  patient verbalizes understanding and continue monitoring    Current 30-day BP avg of 184/74 is still uncontrolled, does not meet BP goal of <150/90.  Reviewed readings: SBP/DBP is trending upwards. DBP is well-controlled. SBP is continuing to be elevated above goal.     Advised patient to continue taking medication as prescribed:  - Take labetalol 100 mg twice daily (morning and evening)  - Take felodipine 2. 5 mg in the afternoon  - Take Tekturna in the evening  - Take doxazosin before bedtime (with second dose of labetalol)  Cannot rule out stomach virus as cause of symptoms over the weekend. Advised patient to hydrate with Gatorade and water to prevent sx of hypotension secondary to dehydration. Also recommended bananas, rice, apples and toast to help with stomach/bowel issues and decrease diarrhea.  Provided [facility] number and [employer] main line to patient. She is aware that office is closed tomorrow for [place] holiday.     Follow-up in one week.      There are no preventive care reminders to display for this patient.    Last 5 Patient Entered Readings                                      Current 30 Day Average: 184/74     Recent Readings 2/20/2020 2/20/2020 2/18/2020 2/18/2020 2/18/2020    SBP (mmHg)  203 203 186 186 186    DBP (mmHg) 81 81 86 86 86    Pulse 58 58 60 60 60             Hypertension Medications             doxazosin (CARDURA) 1 MG tablet Take 1 tablet (1 mg total) by mouth every evening.    felodipine (PLENDIL) 2.5 MG Tb24 Take 1 tablet (2.5 mg total) by mouth once daily. Take in the afternoon.    labetalol (NORMODYNE) 100 MG tablet Take 1 tablet (100 mg total) by mouth every 12 (twelve) hours.    TEKTURNA 300 mg Tab TAKE 1 TABLET (300 MG TOTAL) BY MOUTH ONCE DAILY.                 Screenings

## 2020-02-26 ENCOUNTER — CLINICAL SUPPORT (OUTPATIENT)
Dept: REHABILITATION | Facility: HOSPITAL | Age: 85
End: 2020-02-26
Attending: INTERNAL MEDICINE
Payer: MEDICARE

## 2020-02-26 DIAGNOSIS — R26.81 UNSTEADY GAIT: ICD-10-CM

## 2020-02-26 PROCEDURE — 97110 THERAPEUTIC EXERCISES: CPT | Mod: PO

## 2020-02-26 NOTE — PROGRESS NOTES
Physical Therapy Daily Treatment Note     Name: Gerda Lai  Clinic Number: [id number]    Therapy Diagnosis:   Encounter Diagnosis   Name Primary?    Unsteady gait      Physician: Cesar Burroughs MD    Visit Date: 2/26/2020   Eval and Treat   Medical Diagnosis from Referral:   M75.101,M12.811,M12.812,M75.102 (ICD-10-CM) - Rotator cuff tear arthropathy of both shoulders   M47.816 (ICD-10-CM) - Lumbar spondylosis   M47.812 (ICD-10-CM) - Cervical spondylosis   M17.0 (ICD-10-CM) - Osteoarthritis of both knees, unspecified osteoarthritis type         Evaluation Date: 2/6/2020  Authorization Period Expiration: 01/08/2021  Plan of Care Expiration: 3/20/2020  Visit # / Visits authorized: 1/ 1     Time In: 1:30  Time Out: 2:30  Total Billable Time: 45 minutes     Precautions: Standard and Fall    Subjective     Pt reports: that she is feeling better today  She was compliant with home exercise program.  Response to previous treatment: Pt feel that she is getting more out of HEP that we reviewed  Functional change: Pt reported that she feels as if she is walking better     Pain: 4/10  Location: left side of her neck      Objective     /64  HR 67     Gerda received therapeutic exercises to develop strength, endurance, ROM, posture and core stabilization for 45 minutes including:  Standing rows 10 x 3 with orange TB  Standing B shoulder extension 10 x 3 with orange TB  B shoulder protraction/retraction 20 x 2 with ball on plinth  B shoulder horizontal abd/add 20 x 2 with ball on plinth  SAQ B LE with 3# on each    Gerda did not receive the following manual therapy techniques: Joint mobilizations, Manual traction and Soft tissue Mobilization were applied to the: Cx and thoracic spine and rib cage for 30 minutes, including:  Manual Cx traction  Thoracic spine and rib cage passive mobilization   Soft tissue mobilization Cx and thoracic spine.    Home Exercises Provided and Patient Education Provided     Education  provided:   - yes    Written Home Exercises Provided: yes.  Exercises were reviewed and Gerda was able to demonstrate them prior to the end of the session.  Gerda demonstrated good  understanding of the education provided.     See EMR under Patient Instructions for exercises provided prior visit.    Assessment     Pt with BP in an acceptable range to exercise today.  Progress exercises as tolearated  Gerda is progressing well towards her goals.   Pt prognosis is Good.     Pt will continue to benefit from skilled outpatient physical therapy to address the deficits listed in the problem list box on initial evaluation, provide pt/family education and to maximize pt's level of independence in the home and community environment.     Pt's spiritual, cultural and educational needs considered and pt agreeable to plan of care and goals.     Anticipated barriers to physical therapy: none    Goals:   Short Term Goals: 4 weeks   Pt will be independent in a HEP to address their functional deficits related to balance   Pt will improve her TUG time to 18 seconds to show improvement in dynamic standing balance  Improve Garcia balance scale score to 40/56  to show improvement in dynamic standing balance      Long Term Goals: 8 weeks   Pt will improve her TUG time to 14 seconds to show improvement in dynamic standing balance  Improve Garcia balance scale score to 42/56  to show improvement in dynamic standing balance   Plan     Progress Tx to assist in improving Pt's functional abilities     Ebenezer Sanchez, PT

## 2020-02-28 ENCOUNTER — CLINICAL SUPPORT (OUTPATIENT)
Dept: REHABILITATION | Facility: HOSPITAL | Age: 85
End: 2020-02-28
Attending: INTERNAL MEDICINE
Payer: MEDICARE

## 2020-02-28 DIAGNOSIS — I10 HYPERTENSION, ESSENTIAL: ICD-10-CM

## 2020-02-28 DIAGNOSIS — R26.81 UNSTEADY GAIT: ICD-10-CM

## 2020-02-28 PROCEDURE — 97110 THERAPEUTIC EXERCISES: CPT | Mod: PO

## 2020-02-28 NOTE — PROGRESS NOTES
Physical Therapy Daily Treatment Note     Name: Gerda Lai  Clinic Number: [id number]    Therapy Diagnosis:   Encounter Diagnosis   Name Primary?    Unsteady gait      Physician: Cesar Burroughs MD    Visit Date: 2/28/2020   Eval and Treat   Medical Diagnosis from Referral:   M75.101,M12.811,M12.812,M75.102 (ICD-10-CM) - Rotator cuff tear arthropathy of both shoulders   M47.816 (ICD-10-CM) - Lumbar spondylosis   M47.812 (ICD-10-CM) - Cervical spondylosis   M17.0 (ICD-10-CM) - Osteoarthritis of both knees, unspecified osteoarthritis type         Evaluation Date: 2/6/2020  Authorization Period Expiration: 01/08/2021  Plan of Care Expiration: 3/20/2020  Visit # / Visits authorized: 1/ 1     Time In: 1:30  Time Out: 2:30  Total Billable Time: 45 minutes     Precautions: Standard and Fall    Subjective     Pt reports: that she felt better walking when she left after her last tx  She was compliant with home exercise program.  Response to previous treatment: Pt feel that she is getting more out of HEP that we reviewed  Functional change: Pt reported that she feels as if she is walking better     Pain: 4/10  Location: left side of her neck      Objective     /72  HR 61     Gerda received therapeutic exercises to develop strength, endurance, ROM, posture and core stabilization for 45 minutes including:    Recumbent stationary bike x 10 min  SAQ B LE with 3# 2 x 10on each  Supine B hip abd with orange TB 10 x 3  Supine B hip add with ball 10 x 3      Not performed  Standing rows 10 x 3 with orange TB  Standing B shoulder extension 10 x 3 with orange TB  B shoulder protraction/retraction 20 x 2 with ball on plinth  B shoulder horizontal abd/add 20 x 2 with ball on plinth      Gerda did not receive the following manual therapy techniques: Joint mobilizations, Manual traction and Soft tissue Mobilization were applied to the: Cx and thoracic spine and rib cage for 30 minutes, including:  Manual Cx  traction  Thoracic spine and rib cage passive mobilization   Soft tissue mobilization Cx and thoracic spine.    Home Exercises Provided and Patient Education Provided     Education provided:   - yes    Written Home Exercises Provided: yes.  Exercises were reviewed and Gerda was able to demonstrate them prior to the end of the session.  Gerda demonstrated good  understanding of the education provided.     See EMR under Patient Instructions for exercises provided prior visit.    Assessment     Pt with BP in an acceptable range to exercise today.  Progress exercises as tolearated  Gerda is progressing well towards her goals.   Pt prognosis is Good.     Pt will continue to benefit from skilled outpatient physical therapy to address the deficits listed in the problem list box on initial evaluation, provide pt/family education and to maximize pt's level of independence in the home and community environment.     Pt's spiritual, cultural and educational needs considered and pt agreeable to plan of care and goals.     Anticipated barriers to physical therapy: none    Goals:   Short Term Goals: 4 weeks   Pt will be independent in a HEP to address their functional deficits related to balance   Pt will improve her TUG time to 18 seconds to show improvement in dynamic standing balance  Improve Garcia balance scale score to 40/56  to show improvement in dynamic standing balance      Long Term Goals: 8 weeks   Pt will improve her TUG time to 14 seconds to show improvement in dynamic standing balance  Improve Garcia balance scale score to 42/56  to show improvement in dynamic standing balance   Plan     Progress Tx to assist in improving Pt's functional abilities     Ebenezer Sanchez, PT

## 2020-03-02 RX ORDER — LORAZEPAM 0.5 MG/1
TABLET ORAL
Qty: 30 TABLET | Refills: 0 | Status: SHIPPED | OUTPATIENT
Start: 2020-03-02 | End: 2020-04-08

## 2020-03-03 ENCOUNTER — PROCEDURE VISIT (OUTPATIENT)
Dept: DERMATOLOGY | Facility: CLINIC | Age: 85
End: 2020-03-03
Payer: MEDICARE

## 2020-03-03 ENCOUNTER — TELEPHONE (OUTPATIENT)
Dept: DERMATOLOGY | Facility: CLINIC | Age: 85
End: 2020-03-03

## 2020-03-03 ENCOUNTER — PATIENT OUTREACH (OUTPATIENT)
Dept: OTHER | Facility: OTHER | Age: 85
End: 2020-03-03

## 2020-03-03 VITALS
DIASTOLIC BLOOD PRESSURE: 67 MMHG | BODY MASS INDEX: 24.8 KG/M2 | HEART RATE: 63 BPM | SYSTOLIC BLOOD PRESSURE: 148 MMHG | WEIGHT: 140 LBS | HEIGHT: 63 IN

## 2020-03-03 DIAGNOSIS — C44.729 SQUAMOUS CELL CARCINOMA OF SKIN OF LEFT LOWER EXTREMITY: Primary | ICD-10-CM

## 2020-03-03 PROCEDURE — 13121 CMPLX RPR S/A/L 2.6-7.5 CM: CPT | Mod: S$PBB,51,, | Performed by: DERMATOLOGY

## 2020-03-03 PROCEDURE — 13121 PR RECMPL WND SCALP,EXTR 2.6-7.5 CM: ICD-10-PCS | Mod: S$PBB,51,, | Performed by: DERMATOLOGY

## 2020-03-03 PROCEDURE — 99499 UNLISTED E&M SERVICE: CPT | Mod: S$PBB,,, | Performed by: DERMATOLOGY

## 2020-03-03 PROCEDURE — 17313 MOHS 1 STAGE T/A/L: CPT | Mod: PBBFAC | Performed by: DERMATOLOGY

## 2020-03-03 PROCEDURE — 13121 CMPLX RPR S/A/L 2.6-7.5 CM: CPT | Mod: PBBFAC | Performed by: DERMATOLOGY

## 2020-03-03 PROCEDURE — 17313: ICD-10-PCS | Mod: S$PBB,,, | Performed by: DERMATOLOGY

## 2020-03-03 PROCEDURE — 99499 NO LOS: ICD-10-PCS | Mod: S$PBB,,, | Performed by: DERMATOLOGY

## 2020-03-03 PROCEDURE — 17313 MOHS 1 STAGE T/A/L: CPT | Mod: S$PBB,,, | Performed by: DERMATOLOGY

## 2020-03-03 NOTE — PROGRESS NOTES
PROCEDURE: Mohs' Micrographic Surgery    INDICATION: Tumors with aggressive clinical behavior (rapidly growing, greater than 1 cm in diameter).    REFERRING MD: Demario Sellers M.D.    CASE NUMBER:     ANESTHETIC: 8 cc 0.5% Lidocaine with Epi 1:200,000 mixed 1:1 with 0.5% Bupivacaine    SURGICAL PREP: Hibiclens    SURGEON: Chantale Benoit MD    ASSISTANTS: Nahed Siegel PA-C    PREOPERATIVE DIAGNOSIS: squamous cell carcinoma    POSTOPERATIVE DIAGNOSIS: squamous cell carcinoma    PATHOLOGIC DIAGNOSIS: squamous cell carcinoma- invasive, well differentiated    HISTOLOGY OF SPECIMENS IN FIRST STAGE:   Tumor Type: No tumor seen.    STAGES OF MOHS' SURGERY PERFORMED: 1    TUMOR-FREE PLANE ACHIEVED: Yes    HEMOSTASIS: electrocoagulation     SPECIMENS: 3    LOCATION: left lateral thigh. Patient verified location.    INITIAL LESION SIZE: 1.5 x 2.0 cm    FINAL DEFECT SIZE: 1.9 x 2.3 cm    WOUND REPAIR/DISPOSITION: The patient tolerated Mohs' Micrographic Surgery for a squamous cell carcinoma very well. When the tumor was completely removed, a repair of the surgical defect was undertaken.      PROCEDURE: Complex Linear Repair    INDICATION: Status post Mohs' Micrographic Surgery for squamous cell carcinoma.    CASE NUMBER:     SURGEON: Chantale Benoit MD    ASSISTANTS: Nahed Siegel PA-C, Ángela Hoffman, Surg Tech and Mike Cruz MD    ANESTHETIC: 8 cc 1% Lidocaine with Epinephrine 1:100,000    SURGICAL PREP: Hibiclens, prepped by Ángeal Hoffman, Surg Tech    LOCATION: left lateral thigh    DEFECT SIZE: 1.9 x 2.3 cm    WOUND REPAIR/DISPOSITION:  After the patient's carcinoma had been completely removed with Mohs' Micrographic Surgery, a repair of the surgical defect was undertaken. The patient was returned to the operating suite where the area of left lateral thigh was prepped, draped, and anesthetized in the usual sterile fashion. The wound was widely undermined in all directions. Then, electrocoagulation was used to  "obtain meticulous hemostasis. 3-0 Vicryl buried vertical mattress sutures were placed into the subcutaneous and dermal plane to close the wound and marisel the cutaneous wound edge. Bilateral dog ears were identified and were removed by a standard Burow's triangle technique. The cutaneous wound edges were closed using interrupted 4-0 Prolene suture.    The patient tolerated the procedure well.    The area was cleaned and dressed appropriately and the patient was given wound care instructions, as well as appointment for follow-up evaluation.    LENGTH OF REPAIR: 5.4 cm    Vitals:    03/03/20 1250 03/03/20 1453   BP: (!) 153/58 (!) 148/67   BP Location: Right arm Left arm   Patient Position: Sitting Sitting   BP Method: Small (Automatic) Small (Automatic)   Pulse: 60 63   Weight: 63.5 kg (140 lb)    Height: 5' 3" (1.6 m)          "

## 2020-03-03 NOTE — PROGRESS NOTES
"Digital Medicine: Clinician Follow-Up    Called patient for hypertension follow-up. She reports having skin cancer removed today and being worn out. Her blood pressure was measured before and after her surgery which she states was much better than her previous home readings. "I've started taking my felodipine earlier in the day. It seems to work well with the labetalol."    The history is provided by the patient. No  was used.     Follow Up  Follow-up reason(s): reading review          INTERVENTION(S)  encouragement/support    PLAN  continue monitoring    Current 30-day BP avg of 186/74 is uncontrolled, does not meet BP goal of <150/90.  Reviewed readings: DBP is consistently controlled. SBP is consistently elevated. BP today in derm was /67 which meets goal.     Continue current regimen.    Follow-up next week.      There are no preventive care reminders to display for this patient.    Last 5 Patient Entered Readings                                      Current 30 Day Average: 186/74     Recent Readings 2/25/2020 2/25/2020 2/23/2020 2/23/2020 2/20/2020    SBP (mmHg) 157 157 194 194 203    DBP (mmHg) 63 63 72 72 81    Pulse 62 62 72 72 58             Hypertension Medications             doxazosin (CARDURA) 1 MG tablet Take 1 tablet (1 mg total) by mouth every evening.    felodipine (PLENDIL) 2.5 MG Tb24 Take 1 tablet (2.5 mg total) by mouth once daily. Take in the afternoon.    labetalol (NORMODYNE) 100 MG tablet Take 1 tablet (100 mg total) by mouth every 12 (twelve) hours.    TEKTURNA 300 mg Tab TAKE 1 TABLET (300 MG TOTAL) BY MOUTH ONCE DAILY.                 Screenings  "

## 2020-03-03 NOTE — TELEPHONE ENCOUNTER
----- Message from Yasmin Edwards MA sent at 2/27/2020  4:42 PM CST -----  Contact: pt       ----- Message -----  From: Nancy Roach  Sent: 2/27/2020  10:56 AM CST  To: Kirsten ZABALA Staff    JMARIE - pt Patient Requesting Sooner Appointment.     Reason for sooner appt.: pt is calling to speak with the nurse pt has a couple of spots on her face she is concerned about pt has a history of skin cancer and is a pt of Dr Benoit   When is the first available appointment? 5/2020  Communication Preference: can you please call pt at [phone]  Additional Information: none    NICOLÁS

## 2020-03-09 ENCOUNTER — HOSPITAL ENCOUNTER (OUTPATIENT)
Dept: RADIOLOGY | Facility: HOSPITAL | Age: 85
Discharge: HOME | End: 2020-03-09
Attending: PHYSICIAN ASSISTANT
Payer: MEDICARE

## 2020-03-09 ENCOUNTER — OFFICE VISIT (OUTPATIENT)
Dept: ORTHOPEDICS | Facility: CLINIC | Age: 85
End: 2020-03-09
Payer: MEDICARE

## 2020-03-09 VITALS — WEIGHT: 140 LBS | BODY MASS INDEX: 24.8 KG/M2 | HEIGHT: 63 IN

## 2020-03-09 DIAGNOSIS — M25.561 ACUTE PAIN OF RIGHT KNEE: Primary | ICD-10-CM

## 2020-03-09 DIAGNOSIS — M17.11 PRIMARY OSTEOARTHRITIS OF RIGHT KNEE: ICD-10-CM

## 2020-03-09 DIAGNOSIS — M25.561 ACUTE PAIN OF RIGHT KNEE: ICD-10-CM

## 2020-03-09 PROCEDURE — 99214 OFFICE O/P EST MOD 30 MIN: CPT | Mod: 25,S$PBB,, | Performed by: PHYSICIAN ASSISTANT

## 2020-03-09 PROCEDURE — 73562 XR KNEE ORTHO RIGHT: ICD-10-PCS | Mod: 26,RT,, | Performed by: RADIOLOGY

## 2020-03-09 PROCEDURE — 73562 X-RAY EXAM OF KNEE 3: CPT | Mod: 26,RT,, | Performed by: RADIOLOGY

## 2020-03-09 PROCEDURE — 99999 PR PBB SHADOW E&M-EST. PATIENT-LVL IV: ICD-10-PCS | Mod: PBBFAC,,, | Performed by: PHYSICIAN ASSISTANT

## 2020-03-09 PROCEDURE — 20610 DRAIN/INJ JOINT/BURSA W/O US: CPT | Mod: PBBFAC | Performed by: PHYSICIAN ASSISTANT

## 2020-03-09 PROCEDURE — 20610 DRAIN/INJ JOINT/BURSA W/O US: CPT | Mod: S$PBB,RT,, | Performed by: PHYSICIAN ASSISTANT

## 2020-03-09 PROCEDURE — 99214 PR OFFICE/OUTPT VISIT, EST, LEVL IV, 30-39 MIN: ICD-10-PCS | Mod: 25,S$PBB,, | Performed by: PHYSICIAN ASSISTANT

## 2020-03-09 PROCEDURE — 99214 OFFICE O/P EST MOD 30 MIN: CPT | Mod: PBBFAC,25 | Performed by: PHYSICIAN ASSISTANT

## 2020-03-09 PROCEDURE — 99999 PR PBB SHADOW E&M-EST. PATIENT-LVL IV: CPT | Mod: PBBFAC,,, | Performed by: PHYSICIAN ASSISTANT

## 2020-03-09 PROCEDURE — 73560 XR KNEE ORTHO RIGHT: ICD-10-PCS | Mod: 26,LT,, | Performed by: RADIOLOGY

## 2020-03-09 PROCEDURE — 73560 X-RAY EXAM OF KNEE 1 OR 2: CPT | Mod: 59,TC,LT

## 2020-03-09 PROCEDURE — 20610 LARGE JOINT ASPIRATION/INJECTION: R KNEE: ICD-10-PCS | Mod: S$PBB,RT,, | Performed by: PHYSICIAN ASSISTANT

## 2020-03-09 PROCEDURE — 73560 X-RAY EXAM OF KNEE 1 OR 2: CPT | Mod: 26,LT,, | Performed by: RADIOLOGY

## 2020-03-09 RX ORDER — TRIAMCINOLONE ACETONIDE 40 MG/ML
40 INJECTION, SUSPENSION INTRA-ARTICULAR; INTRAMUSCULAR
Status: DISCONTINUED | OUTPATIENT
Start: 2020-03-09 | End: 2020-03-09 | Stop reason: HOSPADM

## 2020-03-09 RX ADMIN — TRIAMCINOLONE ACETONIDE 40 MG: 40 INJECTION, SUSPENSION INTRA-ARTICULAR; INTRAMUSCULAR at 03:03

## 2020-03-09 NOTE — PROGRESS NOTES
SUBJECTIVE:     Chief Complaint   Patient presents with    Right Knee - Pain       History of Present Illness:  Gerda Lai is a 87 y.o. year old female here with a history of right knee pain for years.  She has been seen in our clinic previously by Bianca Hu.  She has has steroid injections with good relief.  Her last right knee injection was in 2018.  She reports pain along the outside of her knee, stiffness and pain with activity.  There has been no new injury.  She also has pain in the left knee.  Her last injection was in October 2019.  She had skin biopsy of her lateral mid thigh one week ago, which is bandaged.  Her left knee pain has worsened as well.    Review of patient's allergies indicates:   Allergen Reactions    Sulfa (sulfonamide antibiotics) Rash    Clarithromycin Other (See Comments)     Weak, extreme fatigue. dizziness    Flexeril [cyclobenzaprine] Other (See Comments)     Dizziness    Iodine and iodide containing products     Lisinopril Other (See Comments)     Cough and sensation of throat swelling/?angioedema    Losartan Rash    Metoprolol Swelling     Tightness in throat    Tramadol Other (See Comments)     Dizzy and weak    Verapamil (bulk) Palpitations    Voltaren [diclofenac sodium] Other (See Comments)     Drops blood pressure         Current Outpatient Medications   Medication Sig Dispense Refill    acetaminophen (TYLENOL) 650 MG TbSR Take 650 mg by mouth as needed (pain).       albuterol (PROVENTIL/VENTOLIN HFA) 90 mcg/actuation inhaler Inhale 2 puffs into the lungs every 6 (six) hours as needed for Wheezing. Rescue 1 Inhaler 0    alendronate (FOSAMAX) 35 MG tablet Take 1 tablet (35 mg total) by mouth every 7 days. 4 tablet 11    B INFANTIS/B ANI/B JOSE/B BIFID (PROBIOTIC 4X ORAL) Take 1 tablet by mouth daily      coenzyme Q10 (CO Q-10) 100 mg capsule Take 100 mg by mouth once daily.      diclofenac sodium (VOLTAREN) 1 % Gel APPLY TO AFFECTED AREA TWICE A   g 6    doxazosin (CARDURA) 1 MG tablet Take 1 tablet (1 mg total) by mouth every evening. 30 tablet 0    felodipine (PLENDIL) 2.5 MG Tb24 Take 1 tablet (2.5 mg total) by mouth once daily. Take in the afternoon. 30 tablet 1    glucosamine-chondroitin 500-400 mg tablet Take 1 tablet by mouth once daily.       ipratropium (ATROVENT) 0.03 % nasal spray 2 sprays by Nasal route 2 (two) times daily.      labetalol (NORMODYNE) 100 MG tablet Take 1 tablet (100 mg total) by mouth every 12 (twelve) hours. 180 tablet 1    LORazepam (ATIVAN) 0.5 MG tablet TAKE 1/2 TABLET BY MOUTH TWICE A DAY FOR SEVERE ANXIETY 30 tablet 0    multivitamin capsule Take 1 capsule by mouth once daily.      omeprazole (PRILOSEC OTC) 20 MG tablet Take 20 mg by mouth once daily.        polymyxin B sulf-trimethoprim (POLYTRIM) 10,000 unit- 1 mg/mL Drop Place 1 drop into both eyes every 6 (six) hours. 10 mL 0    pravastatin (PRAVACHOL) 40 MG tablet TAKE 1 TABLET (40 MG TOTAL) BY MOUTH ONCE DAILY. 90 tablet 3    TEKTURNA 300 mg Tab TAKE 1 TABLET (300 MG TOTAL) BY MOUTH ONCE DAILY. 90 tablet 2    TURMERIC ORAL Take 500 mg by mouth 2 (two) times daily.       betamethasone valerate 0.1% (VALISONE) 0.1 % Oint Apply topically 2 (two) times daily. for 10 days 45 g 1     No current facility-administered medications for this visit.        Past Medical History:   Diagnosis Date    Arthritis     Basal cell carcinoma 09/2016    right post auricular neck     Breast cancer 1992    right    Cataract     Fibromyalgia     History of measles as a child     Hyperlipidemia     Hypertension     Personal history of colonic polyps     Pneumonia     SCC (squamous cell carcinoma) 2015    R chest    SCC (squamous cell carcinoma) 2016    left medial shoulder    SCC (squamous cell carcinoma) 2017    left knee    Shingles     Squamous cell carcinoma 2015    right forearm    Thyroid disease     Vaginitis        Past Surgical History:   Procedure  "Laterality Date    ADENOIDECTOMY      BREAST BIOPSY Left     Excisional bx, benign    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    CATARACT EXTRACTION W/  INTRAOCULAR LENS IMPLANT Bilateral     EYE SURGERY      JOINT REPLACEMENT      thyriodectomy      partial    tonsillectomy      TONSILLECTOMY      TOTAL HIP ARTHROPLASTY      right    Yag  Left        Vital Signs (Most Recent)  There were no vitals filed for this visit.        Review of Systems:  ROS:  Constitutional: no fever or chills  Eyes: no visual changes  ENT: no nasal congestion or sore throat  Respiratory: no cough or shortness of breath  Cardiovascular: no chest pain or palpitations  Gastrointestinal: no nausea or vomiting, tolerating diet  Genitourinary: no hematuria or dysuria  Integument/Breast: no rash or pruritis  Hematologic/Lymphatic: no easy bruising or lymphadenopathy  Musculoskeletal: no arthralgias or myalgias  Neurological: no seizures or tremors  Behavioral/Psych: no auditory or visual hallucinations  Endocrine: no heat or cold intolerance        OBJECTIVE:     PHYSICAL EXAM:  Height: 5' 3" (160 cm) Weight: 63.5 kg (139 lb 15.9 oz)   General: Well developed, well nourished, in no acute distress.  Neurological: Mood & affect are normal.  HEENT: NCAT, sclera nonicteric   Lungs: Respirations are equal and unlabored.   CV: 2+ bilateral upper and lower extremity pulses.   Skin: Intact throughout with no rashes, erythema, or lesions  Extremities: No LE edema, no erythema or warmth of the skin in either lower extremity.    right  Knee Exam:  Knee Range of Motion: 0-120   Effusion: none  Condition of skin: intact  Location of tenderness:Lateral joint line   Strength:5 of 5 quadriceps strength and 5 of 5 hamstring strength  Stable to testing    left  Knee Exam:  There is bandage over mid lateral thigh  Knee Range of Motion:0-120   Effusion:none  Condition of skin:intact and no erythema  Location of tenderness:Lateral joint line   Strength:5 of " 5 quadriceps strength and 5 of 5 hamstring strength  Stability:  stable to testing      Hip Examination:  full painless range of motion, without tenderness    IMAGING:    X-rays of the right knee, personally reviewed by me, demonstrate severe DJD bilaterally, joint space narrowing, osteophyte formation, subchondral sclerosis.  No fracture or dislocation.    ASSESSMENT/PLAN:   87 y.o. year old female with bilateral knee osteoarthritis     - Right knee CSI today.  Recommend rest, ice activity modification  - Will avoid injection of left knee today. She will follow up next week.  If ok with dermatology, will injection left knee

## 2020-03-09 NOTE — PROCEDURES
Large Joint Aspiration/Injection: R knee  Performed by: Ema Del Rosario PA-C  Authorized by: Ema Del Rosario PA-C  Date/Time: 3/9/2020 3:00 PM    Consent Done?:  Yes (Verbal)  Indications:  painPrep:Patient was prepped and draped in the usual sterile fashion.        Anesthesia    Anesthetic: topical anesthetic    Details:   Needle size: 22 G   Approach: anteromedial  Location:  Knee  Site:  R knee    Medications: 40 mg triamcinolone acetonide 40 mg/mL  Patient tolerance:  patient tolerated the procedure well with no immediate complications

## 2020-03-11 ENCOUNTER — DOCUMENTATION ONLY (OUTPATIENT)
Dept: REHABILITATION | Facility: HOSPITAL | Age: 85
End: 2020-03-11

## 2020-03-11 ENCOUNTER — CLINICAL SUPPORT (OUTPATIENT)
Dept: REHABILITATION | Facility: HOSPITAL | Age: 85
End: 2020-03-11
Attending: INTERNAL MEDICINE
Payer: MEDICARE

## 2020-03-11 DIAGNOSIS — R26.81 UNSTEADY GAIT: ICD-10-CM

## 2020-03-11 PROCEDURE — 97110 THERAPEUTIC EXERCISES: CPT | Mod: PO,CQ

## 2020-03-11 NOTE — PROGRESS NOTES
Physical Therapy Daily Treatment Note     Name: Gerda Lai  Clinic Number: [id number]    Therapy Diagnosis:   Encounter Diagnosis   Name Primary?   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 Denies pain currently.   She was compliant with home exercise program.  Response to previous treatment: Pt feel that she is getting more out of HEP that we reviewed  Functional change: Pt reported that she feels as if she is walking better     Pain: 0/10  Location: left side of her neck      Objective     /72  HR 63     Gerda received therapeutic exercises to develop strength, endurance, ROM, posture and core stabilization for 40 minutes including:    Recumbent stationary bike x 10 min  SAQ B LE with 3# 2 x 10on each  Supine B hip abd with orange TB 10 x 3  Supine B hip add with ball 10 x 3  Standing rows 10 x 3 with orange TB  Standing B shoulder extension 10 x 3 with orange TB  B shoulder horizontal abd/add 20 x 2 with ball on plinth    Post treatment  BP: 162/74  HR: 61      Not performed  B shoulder protraction/retraction 20 x 2 with ball on plinth      Gerda did not receive the following manual therapy techniques: Joint mobilizations, Manual traction and Soft tissue Mobilization were applied to the: Cx and thoracic spine and rib  cage for 0 minutes, including:  Manual Cx traction  Thoracic spine and rib cage passive mobilization   Soft tissue mobilization Cx and thoracic spine.    Home Exercises Provided and Patient Education Provided     Education provided:   - HEP    Written Home Exercises Provided: Patient instructed to cont prior HEP.  Exercises were reviewed and Gerda was able to demonstrate them prior to the end of the session.  Gerda demonstrated good  understanding of the education provided.     See EMR under Patient Instructions for exercises provided prior visit.    Assessment     Patient tolerated treatment well today with no onset of adverse effects. BP monitored and recorded above.     Gerda is progressing well towards her goals.   Pt prognosis is Good.     Pt will continue to benefit from skilled outpatient physical therapy to address the deficits listed in the problem list box on initial evaluation, provide pt/family education and to maximize pt's level of independence in the home and community environment.     Pt's spiritual, cultural and educational needs considered and pt agreeable to plan of care and goals.     Anticipated barriers to physical therapy: none    Goals:   Short Term Goals: 4 weeks   Pt will be independent in a HEP to address their functional deficits related to balance   Pt will improve her TUG time to 18 seconds to show improvement in dynamic standing balance  Improve Garcia balance scale score to 40/56  to show improvement in dynamic standing balance      Long Term Goals: 8 weeks   Pt will improve her TUG time to 14 seconds to show improvement in dynamic standing balance  Improve Garcia balance scale score to 42/56  to show improvement in dynamic standing balance   Plan     Progress Tx to assist in improving Pt's functional abilities     Trinidad Bingham, PTA

## 2020-03-11 NOTE — PROGRESS NOTES
Physical Therapy Daily Treatment Note     Name: Gerda Lai  Clinic Number: [id number]    Therapy Diagnosis:   No diagnosis found.  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  Response to previous treatment: Pt feel that she is getting more out of HEP that we reviewed  Functional change: Pt reported that she feels as if she is walking better     Pain: 4/10  Location: left side of her neck      Objective     /72  HR 61     Gerda received therapeutic exercises to develop strength, endurance, ROM, posture and core stabilization for 45 minutes including:    Recumbent stationary bike x 10 min  SAQ B LE with 3# 2 x 10on each  Supine B hip abd with orange TB 10 x 3  Supine B hip add with ball 10 x 3      Not performed  Standing rows 10 x 3 with orange TB  Standing B shoulder extension 10 x 3 with orange TB  B shoulder protraction/retraction 20 x 2 with ball on plinth  B shoulder horizontal abd/add 20 x 2 with ball on plinth      Gerda did not receive the following manual therapy techniques: Joint mobilizations, Manual traction and Soft tissue Mobilization were applied to the: Cx and thoracic spine and rib cage for 30 minutes, including:  Manual Cx traction  Thoracic spine and rib cage passive mobilization   Soft tissue mobilization Cx and thoracic spine.    Home Exercises  Provided and Patient Education Provided     Education provided:   - yes    Written Home Exercises Provided: yes.  Exercises were reviewed and Gerda was able to demonstrate them prior to the end of the session.  Gerda demonstrated good  understanding of the education provided.     See EMR under Patient Instructions for exercises provided prior visit.    Assessment       Gerda is progressing well towards her goals.   Pt prognosis is Good.     Pt will continue to benefit from skilled outpatient physical therapy to address the deficits listed in the problem list box on initial evaluation, provide pt/family education and to maximize pt's level of independence in the home and community environment.     Pt's spiritual, cultural and educational needs considered and pt agreeable to plan of care and goals.     Anticipated barriers to physical therapy: none    Goals:   Short Term Goals: 4 weeks   Pt will be independent in a HEP to address their functional deficits related to balance   Pt will improve her TUG time to 18 seconds to show improvement in dynamic standing balance  Improve Garcia balance scale score to 40/56  to show improvement in dynamic standing balance      Long Term Goals: 8 weeks   Pt will improve her TUG time to 14 seconds to show improvement in dynamic standing balance  Improve Garcia balance scale score to 42/56  to show improvement in dynamic standing balance   Plan     Progress Tx to assist in improving Pt's functional abilities     Alley Mcdermott, PTA

## 2020-03-11 NOTE — PROGRESS NOTES
PT/PTA met face to face to discuss pt's treatment plan and progress towards established goals. Pt will be seen by a physical therapist minimally every 6th visit or every 30 days.      Trinidad Bingham PTA

## 2020-03-13 ENCOUNTER — CLINICAL SUPPORT (OUTPATIENT)
Dept: REHABILITATION | Facility: HOSPITAL | Age: 85
End: 2020-03-13
Attending: INTERNAL MEDICINE
Payer: MEDICARE

## 2020-03-13 DIAGNOSIS — R26.81 UNSTEADY GAIT: ICD-10-CM

## 2020-03-13 PROCEDURE — 97110 THERAPEUTIC EXERCISES: CPT | Mod: PO,CQ

## 2020-03-13 NOTE — PROGRESS NOTES
Physical Therapy Daily Treatment Note     Name: Gerda Lai  Clinic Number: [id number]    Therapy Diagnosis:   Encounter Diagnosis   Name Primary?   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  Response to previous treatment: Pt feel that she is getting more out of HEP that we reviewed  Functional change: Pt reported that she feels as if she is walking better     Pain: 1/10  Location: left side of her neck      Objective     Pre tx:   - /68  HR 62    Post tx:    - /72  HR: 74   - Taken again after 6 minutes BP : 156/70 HR :55     Gerda received therapeutic exercises to develop strength, endurance, ROM, posture and core stabilization for 50 minutes including:    Recumbent stationary bike x 10 min  SAQ B LE with 3# 2 x 10on each  Supine B hip abd with orange TB 10 x 3  Supine B hip add with ball 10 x 3  Standing rows 10 x 3 with orange TB  Standing B shoulder extension 10 x 3 with orange TB  B shoulder horizontal abd/add 20 x 2 with ball on plinth  B shoulder protraction/retraction 20 x 2 with ball on plinth      Gerda did not receive the following manual therapy techniques: Joint mobilizations, Manual traction and Soft tissue Mobilization were applied to the: Cx and thoracic  spine and rib cage for 0 minutes, including:  Manual Cx traction  Thoracic spine and rib cage passive mobilization   Soft tissue mobilization Cx and thoracic spine.    Home Exercises Provided and Patient Education Provided     Education provided:   - HEP    Written Home Exercises Provided: Patient instructed to cont prior HEP.  Exercises were reviewed and Gerda was able to demonstrate them prior to the end of the session.  Gerda demonstrated good  understanding of the education provided.     See EMR under Patient Instructions for exercises provided prior visit.    Assessment     Pt is progressing well and was able to complete session with no adverse effects . She exhibits crepitus in her right shoulder more with active motions compared to active assisted . Pts BP was within acceptable range to participate in session today. It was elevated after session although naturally decreased back into acceptable range after 5-6 minutes of resting. Will continue to progress as tolerated.     Gerda is progressing well towards her goals.   Pt prognosis is Good.     Pt will continue to benefit from skilled outpatient physical therapy to address the deficits listed in the problem list box on initial evaluation, provide pt/family education and to maximize pt's level of independence in the home and community environment.   Pt's spiritual, cultural and educational needs considered and pt agreeable to plan of care and goals.     Anticipated barriers to physical therapy: none    Goals:   Short Term Goals: 4 weeks   Pt will be independent in a HEP to address their functional deficits related to balance   Pt will improve her TUG time to 18 seconds to show improvement in dynamic standing balance  Improve Garcia balance scale score to 40/56  to show improvement in dynamic standing balance      Long Term Goals: 8 weeks   Pt will improve her TUG time to 14 seconds to show improvement in dynamic standing balance  Improve Garcia balance scale  score to 42/56  to show improvement in dynamic standing balance   Plan     Progress Tx to assist in improving Pt's functional abilities     Alley Mcdermott, PTA

## 2020-03-17 ENCOUNTER — OFFICE VISIT (OUTPATIENT)
Dept: ORTHOPEDICS | Facility: CLINIC | Age: 85
End: 2020-03-17
Payer: MEDICARE

## 2020-03-17 ENCOUNTER — PATIENT OUTREACH (OUTPATIENT)
Dept: OTHER | Facility: OTHER | Age: 85
End: 2020-03-17

## 2020-03-17 ENCOUNTER — OFFICE VISIT (OUTPATIENT)
Dept: DERMATOLOGY | Facility: CLINIC | Age: 85
End: 2020-03-17
Payer: MEDICARE

## 2020-03-17 VITALS
HEART RATE: 61 BPM | HEIGHT: 63 IN | TEMPERATURE: 98 F | BODY MASS INDEX: 24.8 KG/M2 | SYSTOLIC BLOOD PRESSURE: 144 MMHG | RESPIRATION RATE: 20 BRPM | WEIGHT: 140 LBS | DIASTOLIC BLOOD PRESSURE: 69 MMHG

## 2020-03-17 DIAGNOSIS — Z09 POSTOP CHECK: Primary | ICD-10-CM

## 2020-03-17 DIAGNOSIS — C44.92 CANCER OF SKIN, SQUAMOUS CELL: ICD-10-CM

## 2020-03-17 DIAGNOSIS — M17.12 PRIMARY OSTEOARTHRITIS OF LEFT KNEE: Primary | ICD-10-CM

## 2020-03-17 DIAGNOSIS — I10 HYPERTENSION, ESSENTIAL: ICD-10-CM

## 2020-03-17 PROCEDURE — 99999 PR PBB SHADOW E&M-EST. PATIENT-LVL II: ICD-10-PCS | Mod: PBBFAC,,, | Performed by: DERMATOLOGY

## 2020-03-17 PROCEDURE — 20610 DRAIN/INJ JOINT/BURSA W/O US: CPT | Mod: S$PBB,LT,, | Performed by: PHYSICIAN ASSISTANT

## 2020-03-17 PROCEDURE — 99213 PR OFFICE/OUTPT VISIT, EST, LEVL III, 20-29 MIN: ICD-10-PCS | Mod: 25,S$PBB,, | Performed by: PHYSICIAN ASSISTANT

## 2020-03-17 PROCEDURE — 99024 POSTOP FOLLOW-UP VISIT: CPT | Mod: POP,,, | Performed by: DERMATOLOGY

## 2020-03-17 PROCEDURE — 99212 OFFICE O/P EST SF 10 MIN: CPT | Mod: PBBFAC,27,25 | Performed by: DERMATOLOGY

## 2020-03-17 PROCEDURE — 99024 PR POST-OP FOLLOW-UP VISIT: ICD-10-PCS | Mod: POP,,, | Performed by: DERMATOLOGY

## 2020-03-17 PROCEDURE — 99214 OFFICE O/P EST MOD 30 MIN: CPT | Mod: PBBFAC | Performed by: PHYSICIAN ASSISTANT

## 2020-03-17 PROCEDURE — 99213 OFFICE O/P EST LOW 20 MIN: CPT | Mod: 25,S$PBB,, | Performed by: PHYSICIAN ASSISTANT

## 2020-03-17 PROCEDURE — 20610 DRAIN/INJ JOINT/BURSA W/O US: CPT | Mod: PBBFAC,LT | Performed by: PHYSICIAN ASSISTANT

## 2020-03-17 PROCEDURE — 99999 PR PBB SHADOW E&M-EST. PATIENT-LVL IV: ICD-10-PCS | Mod: PBBFAC,,, | Performed by: PHYSICIAN ASSISTANT

## 2020-03-17 PROCEDURE — 99999 PR PBB SHADOW E&M-EST. PATIENT-LVL II: CPT | Mod: PBBFAC,,, | Performed by: DERMATOLOGY

## 2020-03-17 PROCEDURE — 20610 PR DRAIN/INJECT LARGE JOINT/BURSA: ICD-10-PCS | Mod: S$PBB,LT,, | Performed by: PHYSICIAN ASSISTANT

## 2020-03-17 PROCEDURE — 99999 PR PBB SHADOW E&M-EST. PATIENT-LVL IV: CPT | Mod: PBBFAC,,, | Performed by: PHYSICIAN ASSISTANT

## 2020-03-17 PROCEDURE — 20610 DRAIN/INJ JOINT/BURSA W/O US: CPT | Mod: PBBFAC | Performed by: PHYSICIAN ASSISTANT

## 2020-03-17 RX ORDER — FELODIPINE 2.5 MG/1
5 TABLET, EXTENDED RELEASE ORAL DAILY
Qty: 30 TABLET | Refills: 5 | Status: SHIPPED | OUTPATIENT
Start: 2020-03-17 | End: 2020-04-17 | Stop reason: SDUPTHER

## 2020-03-17 RX ORDER — TRIAMCINOLONE ACETONIDE 40 MG/ML
40 INJECTION, SUSPENSION INTRA-ARTICULAR; INTRAMUSCULAR
Status: DISCONTINUED | OUTPATIENT
Start: 2020-03-17 | End: 2020-03-17 | Stop reason: HOSPADM

## 2020-03-17 RX ADMIN — TRIAMCINOLONE ACETONIDE 40 MG: 40 INJECTION, SUSPENSION INTRA-ARTICULAR; INTRAMUSCULAR at 04:03

## 2020-03-17 NOTE — PROGRESS NOTES
87 y.o. female patient is here for suture removal following Mohs' surgery.    Patient reports no problems with left lateral thigh.    WOUND PE:  The left lateral thigh sutures intact. Wound healing well. Good skin edges. No signs or symptoms of infection.    IMPRESSION:  Healing operative site from Mohs' surgery SCC, left lateral thigh s/p Mohs with CLC, postop day # 14.    PLAN:  Sutures removed today. Steri-strips applied.  Continue wound care.  Keep moist with Aquaphor.  Call if any issues arise.    RTC:  In 3-6 months with Demario Sellers MD for skin check or sooner if new concern arises.

## 2020-03-17 NOTE — PROCEDURES
Large Joint Aspiration/Injection: L knee  Performed by: Ema Del Rosario PA-C  Authorized by: Ema Del Rosario PA-C  Date/Time: 3/17/2020 4:00 PM      Details:   Needle size: 22 G   Approach: anteromedial  Location:  Knee  Site:  L knee    Medications: 40 mg triamcinolone acetonide 40 mg/mL  Patient tolerance:  patient tolerated the procedure well with no immediate complications

## 2020-03-17 NOTE — LETTER
March 17, 2020      Demario Sellers MD  [address]  [facility] [address]           [facility]  [address]  [facility] [address]  Phone: [phone]  Fax: [phone]          Patient: Gerda Lai   MR Number: [id number]   YOB: 1933   Date of Visit: 3/17/2020       Dear Dr. Demario Sellers:    Thank you for referring Gerda Lai to me for evaluation. Attached you will find relevant portions of my assessment and plan of care.    If you have questions, please do not hesitate to call me. I look forward to following Gerda Lai along with you.    Sincerely,    Chantale Benoit MD    Enclosure  CC:  No Recipients    If you would like to receive this communication electronically, please contact [email or web address] or [phone] to request more information on [employer] Link access.    For providers and/or their staff who would like to refer a patient to [facility], please contact us through our one-stop-shop provider referral line, [facility], at [phone].    If you feel you have received this communication in error or would no longer like to receive these types of communications, please e-mail [email or web address]

## 2020-03-17 NOTE — PROGRESS NOTES
"Patient ID: Gerda Lai is a 87 y.o. female.    Chief Complaint: Pain of the Left Knee      HISTORY:  Gerda Lai is a 87 y.o. female who returns to me today for follow up of left knee pain.  She was last seen by me 3/9/2020.  Today she is doing well, but notes continued intermittent pain, worse with activity.  She is requesting left knee CSI.  This was ok per dermatology.  She has them periodically with good relief.       PMH/PSH/FamHx/SocHx:    Unchanged from prior visit.    ROS:  Constitution: Negative for chills, fever and weakness.   Cardiovascular: Negative for chest pain.   Respiratory: Negative for cough and shortness of breath.   Hematologic/Lymphatic: Negative for bleeding problem. Does not bruise/bleed easily.   Skin: Negative for color change, itching and poor wound healing.   Musculoskeletal: Positive for left knee pain  Gastrointestinal: Negative for heartburn.   Neurological: Negative for dizziness, focal weakness, numbness, paresthesias and sensory change.   Psychiatric/Behavioral: The patient is not nervous/anxious.   Allergic/Immunologic: Negative for environmental allergies and persistent infections.     PHYSICAL EXAM:   BP (!) 144/69 (BP Location: Left arm, Patient Position: Sitting, BP Method: Medium (Automatic))   Pulse 61   Temp 97.6 °F (36.4 °C)   Resp 20   Ht 5' 3" (1.6 m)   Wt 63.5 kg (139 lb 15.9 oz)   BMI 24.80 kg/m²   Skin intact, no swelling  ROM 0-120  TTP lateral joint line  No effusion    IMAGING: No new imaging. Severe DJD bilaterally.    ASSESSMENT/PLAN:    Gerda was seen today for pain.    Diagnoses and all orders for this visit:    Primary osteoarthritis of left knee  -     Large Joint Aspiration/Injection: L knee        - She was given left knee CSI today.  Recommend rest, ice activity modification.  - Follow up as needed     "

## 2020-03-17 NOTE — PROGRESS NOTES
"Digital Medicine: Clinician Follow-Up    "I don't think this doxazosin is working." Ms. Lorenz called to express concerns about her blood pressure readings as they have been elevated lately. She admits to taking an extra labetalol to reduce blood pressure in order to complete rehab on days when blood pressure was higher than goal. She has an upcoming appointment with cardiology on 3/19/20 for a check-up.    The history is provided by the patient. No  was used.       INTERVENTION(S)  reviewed appropriate dose schedule and encouragement/support    PLAN  patient verbalizes understanding and continue monitoring    Current 30-day BP avg of 186/76 is uncontrolled, does not meet goal of <150/90.  Reviewed readings: DBP continues to be well-controlled and meets goal. SBP continues to be elevated above goal. Trend where readings are high in early evening.  Discussed BP technique.  Reviewed dose schedule.    Increase felodipine to 5 mg total daily, divided into 2.5 mg in the morning and 2.5 mg in the evening.   Continue labetalol, Tekturna and doxazosin as prescribed.   Suggested patient take BP device with her to cardiology appointment for comparison reading.   Modified dose schedule:    - Morning: Labetalol at 10 am followed by felodipine after breakfast.   - 4-5p: Take Tekturna and Felodipine   - Bedtime: Labetalol and doxazosin    Follow-up in 1 week.      There are no preventive care reminders to display for this patient.    Last 5 Patient Entered Readings                                      Current 30 Day Average: 186/76     Recent Readings 3/16/2020 3/16/2020 3/8/2020 3/8/2020 2/25/2020    SBP (mmHg) 182 182 195 195 157    DBP (mmHg) 74 74 73 73 63    Pulse 58 58 66 66 62           Hypertension Medications             doxazosin (CARDURA) 1 MG tablet Take 1 tablet (1 mg total) by mouth every evening.    felodipine (PLENDIL) 2.5 MG Tb24 Take 2 tablets (5 mg total) by mouth once daily. To reduce " symptoms, take 2.5 mg in the morning and 2.5 mg in the early evening.    labetalol (NORMODYNE) 100 MG tablet Take 1 tablet (100 mg total) by mouth every 12 (twelve) hours.    TEKTURNA 300 mg Tab TAKE 1 TABLET (300 MG TOTAL) BY MOUTH ONCE DAILY.             Screenings

## 2020-03-18 ENCOUNTER — PATIENT OUTREACH (OUTPATIENT)
Dept: OTHER | Facility: OTHER | Age: 85
End: 2020-03-18

## 2020-03-18 NOTE — PROGRESS NOTES
Digital Medicine: Clinician Follow-Up    Patient called concerned about her morning blood pressure reading. Gerda Lai submitted a reading of 222/83 at 3/18/2020  9:28 AM. Ms. Lorenz states that she has some dizziness with elevated reading which she feels started last night while lying in bed reading. She has not taken her morning blood pressure medications instead she called asking if she should take Fosamax. She believes she took all medications as prescribed yesterday evening - Tekturna (6pm), labetalol, felodipine and doxazosin.  Patient is looking for guidance on which medications to take. She denies needing emergency assistance, 911 or the ED. She cannot recall dietary or water intake from yesterday to rule out low blood glucose or dehydration as the cause of her symptoms. She is also asking if she should keep rehab and massage appointments due to elevated reading.    The history is provided by the patient. No  was used.     Follow Up  Follow-up reason(s): reading review      Alert received.   Care Team received high BP alert.  Patient is experiencing symptomsof hypertension and dizziness.        INTERVENTION(S)  encouragement/support    PLAN  patient verbalizes understanding and continue monitoring    Instructed patient to take labetalol 100 mg and felodipine 2.5 mg now.  Relax and re-measure BP in 2 hours. If she feels that sx are worsening, call 911 or have someone get her to the ED as safely as possible.   Will f/u with patient in 2 hours. If BP is still elevated, will instruct her to take an extra labetalol 100 mg tablet.      There are no preventive care reminders to display for this patient.    Last 5 Patient Entered Readings                                      Current 30 Day Average: 191/77     Recent Readings 3/18/2020 3/18/2020 3/16/2020 3/16/2020 3/8/2020    SBP (mmHg) 222 222 182 182 195    DBP (mmHg) 83 83 74 74 73    Pulse 81 81 58 58 66             Hypertension  Medications             doxazosin (CARDURA) 1 MG tablet Take 1 tablet (1 mg total) by mouth every evening.    felodipine (PLENDIL) 2.5 MG Tb24 Take 2 tablets (5 mg total) by mouth once daily. To reduce symptoms, take 2.5 mg in the morning and 2.5 mg in the early evening.    labetalol (NORMODYNE) 100 MG tablet Take 1 tablet (100 mg total) by mouth every 12 (twelve) hours.    TEKTURNA 300 mg Tab TAKE 1 TABLET (300 MG TOTAL) BY MOUTH ONCE DAILY.                 Screenings

## 2020-03-18 NOTE — PROGRESS NOTES
Patient called to share that BP is improved with additional dose of labetalol 100 mg. She re-measured and BP is now back within her normal range, 168/61. She is seeking guidance on taking her evening dose of medications.     Additional dose of labetalol was taken around 1 pm today. Advised Ms. Lorenz to wait until 6 pm to take Tekturna to prevent any discouraging sxs. Take nighttime dose of labetalol and felodipine at 10 pm.    Considering increasing her labetalol back to 200 mg in the morning for better BP control.    Last 5 Patient Entered Readings                                      Current 30 Day Average: 184/75     Recent Readings 3/18/2020 3/18/2020 3/18/2020 3/18/2020 3/18/2020    SBP (mmHg) 168 168 181 181 196    DBP (mmHg) 61 61 67 67 78    Pulse 72 72 71 71 72        Hypertension Medications             doxazosin (CARDURA) 1 MG tablet Take 1 tablet (1 mg total) by mouth every evening.    felodipine (PLENDIL) 2.5 MG Tb24 Take 2 tablets (5 mg total) by mouth once daily. To reduce symptoms, take 2.5 mg in the morning and 2.5 mg in the early evening.    labetalol (NORMODYNE) 100 MG tablet Take 1 tablet (100 mg total) by mouth every 12 (twelve) hours.    TEKTURNA 300 mg Tab TAKE 1 TABLET (300 MG TOTAL) BY MOUTH ONCE DAILY.

## 2020-03-18 NOTE — PROGRESS NOTES
Patient re-measured BP, now 196/78. She reports that she took labetalol and felodipine plus ate breakfast and drink 8 oz of water with coffee as instructed then took a nap. She feels much better, no more dizziness.   Due to current BP, instructed patient to take another dose of labetalol 100 mg. Also suggested she cancel rehab appointment for today and try to reschedule for tomorrow due to elevated BP. Past experience is rehab will not work on her if BP is elevated.     Patient expressed understanding. Will recheck BP in 2-hours.

## 2020-03-19 ENCOUNTER — DOCUMENTATION ONLY (OUTPATIENT)
Dept: CARDIOLOGY | Facility: CLINIC | Age: 85
End: 2020-03-19

## 2020-03-19 ENCOUNTER — OFFICE VISIT (OUTPATIENT)
Dept: CARDIOLOGY | Facility: CLINIC | Age: 85
End: 2020-03-19
Payer: MEDICARE

## 2020-03-19 DIAGNOSIS — E78.00 PURE HYPERCHOLESTEROLEMIA: ICD-10-CM

## 2020-03-19 DIAGNOSIS — I10 HYPERTENSION, ESSENTIAL: Primary | ICD-10-CM

## 2020-03-19 DIAGNOSIS — I77.1 SUBCLAVIAN ARTERY STENOSIS, RIGHT: ICD-10-CM

## 2020-03-19 DIAGNOSIS — D64.9 ANEMIA, UNSPECIFIED TYPE: ICD-10-CM

## 2020-03-19 PROCEDURE — 99214 PR OFFICE/OUTPT VISIT, EST, LEVL IV, 30-39 MIN: ICD-10-PCS | Mod: 95,,, | Performed by: INTERNAL MEDICINE

## 2020-03-19 PROCEDURE — 99214 OFFICE O/P EST MOD 30 MIN: CPT | Mod: 95,,, | Performed by: INTERNAL MEDICINE

## 2020-03-19 NOTE — PATIENT INSTRUCTIONS
Your labetalol tabs are 100 mg each. Increase labetalol to 1.5 tab at night and continue 1 tab in the am. Continue felodipine twice a day.  You can also continue the doxazosin at night for now. Stay away from salt.  Avoid checking BP in the right arm.  You have a blockage in that arm and it may give you a falsely low reading.    Call/message us on Monday and April and I will discuss and decide if you should increase the night time labetalol to 2 tab     I will speak to Dr. Sellers about taking iron supplements to raise your hemoglobin (blood count).  You should set up an appointment w an internist or family practitioner sometime in the summer.

## 2020-03-19 NOTE — PROGRESS NOTES
"The patient location is: [place].  The chief complaint leading to consultation is: Hypertension.   Visit type: Virtual visit with synchronous audio and video  Total time spent with patient: 30 minutes.  Each patient to whom he or she provides medical services by telemedicine is:  (1) informed of the relationship between the physician and patient and the respective role of any other health care provider with respect to management of the patient; and (2) notified that he or she may decline to receive medical services by telemedicine and may withdraw from such care at any time.      Problem List:  Hypertension  - intolerance to multiple antihypertensive meds  - wide auscultatory gap  Right subclavian artery stenosis  Breast cancer    Ms. Gerda Lai is enrolled in the Digital Medicine program for hypertension. I reviewed the readings. She has been intolerant to several antihypertensive meds.  She takes labetalol 100 mg b.i.d., felodipine 2.5 mg b.i.d. and Tekturna 300 mg at night.  Doxazosin 1 mg at night was added last month.  She used to take hydrochlorothiazide but does not take a diuretic at the present time.  She recalls feeling as "dizzy as a bat" when taking 200 mg of labetalol bid.    Her BP was elevated at 222/80 mm Hg yesterday in the am (before taking her am meds). She was dizzy and cooked herself a burger with a sprinkle of salt the night before. April Green had her take an additional 100 mg of labetalol that gradually lowered her BP.  This am her BP was 170/66 mm Hg. Her BP goal is <150/90. She did not have chest discomfort or shortness of breath.    I reviewed labs performed in 01/2020 by Dr. Area.  Lipids look good on 40 mg of pravastatin.  Hepatic transaminases were within normal limits.  BUN and creatinine were excellent.  Hemoglobin has been slowly declining from 12.5 to 10 g/dl. Dr. Sellers had ordered iron studies that are slightly abnormal.         Current Outpatient Medications:     acetaminophen " (TYLENOL) 650 MG TbSR, Take 650 mg by mouth as needed (pain). , Disp: , Rfl:     albuterol (PROVENTIL/VENTOLIN HFA) 90 mcg/actuation inhaler, Inhale 2 puffs into the lungs every 6 (six) hours as needed for Wheezing. Rescue, Disp: 1 Inhaler, Rfl: 0    alendronate (FOSAMAX) 35 MG tablet, Take 1 tablet (35 mg total) by mouth every 7 days., Disp: 4 tablet, Rfl: 11    B INFANTIS/B ANI/B JOSE/B BIFID (PROBIOTIC 4X ORAL), Take 1 tablet by mouth daily, Disp: , Rfl:     coenzyme Q10 (CO Q-10) 100 mg capsule, Take 100 mg by mouth once daily., Disp: , Rfl:     diclofenac sodium (VOLTAREN) 1 % Gel, APPLY TO AFFECTED AREA TWICE A DAY, Disp: 100 g, Rfl: 6    doxazosin (CARDURA) 1 MG tablet, Take 1 tablet (1 mg total) by mouth every evening., Disp: 30 tablet, Rfl: 0    felodipine (PLENDIL) 2.5 MG Tb24, Take 2 tablets (5 mg total) by mouth once daily. To reduce symptoms, take 2.5 mg in the morning and 2.5 mg in the early evening., Disp: 30 tablet, Rfl: 5    glucosamine-chondroitin 500-400 mg tablet, Take 1 tablet by mouth once daily. , Disp: , Rfl:     ipratropium (ATROVENT) 0.03 % nasal spray, 2 sprays by Nasal route 2 (two) times daily as needed. , Disp: , Rfl:     labetalol (NORMODYNE) 100 MG tablet, Take 1 tablet (100 mg total) by mouth every 12 (twelve) hours., Disp: 180 tablet, Rfl: 1    LORazepam (ATIVAN) 0.5 MG tablet, TAKE 1/2 TABLET BY MOUTH TWICE A DAY FOR SEVERE ANXIETY, Disp: 30 tablet, Rfl: 0    multivitamin capsule, Take 1 capsule by mouth once daily., Disp: , Rfl:     omeprazole (PRILOSEC OTC) 20 MG tablet, Take 20 mg by mouth once daily.  , Disp: , Rfl:     polymyxin B sulf-trimethoprim (POLYTRIM) 10,000 unit- 1 mg/mL Drop, Place 1 drop into both eyes every 6 (six) hours., Disp: 10 mL, Rfl: 0    pravastatin (PRAVACHOL) 40 MG tablet, TAKE 1 TABLET (40 MG TOTAL) BY MOUTH ONCE DAILY., Disp: 90 tablet, Rfl: 3    TEKTURNA 300 mg Tab, TAKE 1 TABLET (300 MG TOTAL) BY MOUTH ONCE DAILY., Disp: 90 tablet, Rfl:  2    TURMERIC ORAL, Take 500 mg by mouth once daily. , Disp: , Rfl:       Assessment and Plan:    1. Hypertension, essential  Increase labetalol at night to 150 mg, continue 100 mg in a.m.   Review on Monday. The plan is to increase the night time dose to 200 mg if necessary to avoid severe hypertension early in the morning.  She may need to resume a thiazide diuretic at a low dose, either PRN or 2-3 times a week.  Goal <150/90 may need to be revised.    2. Subclavian artery stenosis, right  Avoid checking BP in the right arm.    3. Pure hypercholesterolemia  Continue pravastatin.    4. Anemia, unspecified type  Will message Dr. Sellers to address possible iron deficiency.    Review by phone on Monday. RTC in 6 months w NP.

## 2020-03-23 ENCOUNTER — TELEPHONE (OUTPATIENT)
Dept: REHABILITATION | Facility: HOSPITAL | Age: 85
End: 2020-03-23

## 2020-03-23 NOTE — TELEPHONE ENCOUNTER
Postponed Appointments    Patient: Gerda Lai  Date: 3/23/2020  Diagnosis: No diagnosis found.  MRN: [id number]    Spoke with patient due to therapy following updates regarding COVID-19 closely and taking every precaution to ensure the safety of our patients, staff and community.  In an abundance of caution and in an effort to help reduce risk and limit community spread, we have decided to temporarily postpone appointments for patients who may be at increased risk to attend in-person therapy or where therapy is not critically needed at this time. Plan of care and home exercise program were reviewed and patient has what they need to continue therapy at home. All patient questions were answered. Also stated to patient that we are exploring virtual methods of providing care and will be in touch over the next few weeks. Patient verbalized understanding to all.      Alley Mcdermott, PTA  3/23/2020

## 2020-03-24 ENCOUNTER — PATIENT OUTREACH (OUTPATIENT)
Dept: OTHER | Facility: OTHER | Age: 85
End: 2020-03-24

## 2020-03-24 ENCOUNTER — DOCUMENTATION ONLY (OUTPATIENT)
Dept: REHABILITATION | Facility: HOSPITAL | Age: 85
End: 2020-03-24

## 2020-03-24 NOTE — PROGRESS NOTES
"Digital Medicine: Clinician Follow-Up    Called patient for hypertension follow-up. Ms. Lorenz reports that things are going well. She confirms completing virtual visit with cardiologist MD Nicol on 3/19/20 where she was instructed to increase labetalol to 1.5 tablets at night and continue 1 tablet every morning. She confirmed making this dose change. She has not noticed any improvements in her blood pressure but states that she "does feel better physically." She has not taken her blood pressure today. She plans to measure her blood pressure after breakfast. She notes some slight swelling in ankles.    The history is provided by the patient. No  was used.     Follow Up  Follow-up reason(s): reading review and medication change          INTERVENTION(S)  recommended med change and encouragement/support    PLAN  patient verbalizes understanding, patient amenable to changes and continue monitoring    No significant improvement to BP with increased nighttime dose of labetalol to 150 mg.  Discussed the possibility of reintroducing low dose HCTZ to regimen for better control. HCTZ was used in the past but discontinued due to patient complaint of ADR caused by drug - lightheadedness, dizziness, confusion, fatigue, trouble with balance. Can consider HCTZ 12.5 mg PRN.  Still question if Tekturna or doxazosin are impacting BP. Consider discontinuing.    Instructed patient to increase labetalol nighttime dose to 200 mg as discussed.  Continue Tekturna, doxazosin and felodipine as prescribed.   Elevate feet during day to reduce swelling.  Continue to measure BP frequently for monitoring.    Follow-up in 1 week.        There are no preventive care reminders to display for this patient.    Last 5 Patient Entered Readings                                      Current 30 Day Average: 176/71     Recent Readings 3/22/2020 3/22/2020 3/20/2020 3/20/2020 3/20/2020    SBP (mmHg) 175 175 166 166 156    DBP (mmHg) 77 " 77 69 69 68    Pulse 59 59 62 62 57             Hypertension Medications             doxazosin (CARDURA) 1 MG tablet Take 1 tablet (1 mg total) by mouth every evening.    felodipine (PLENDIL) 2.5 MG Tb24 Take 2 tablets (5 mg total) by mouth once daily. To reduce symptoms, take 2.5 mg in the morning and 2.5 mg in the early evening.    labetalol (NORMODYNE) 100 MG tablet Take 1 tablet (100 mg total) by mouth every 12 (twelve) hours.    TEKTURNA 300 mg Tab TAKE 1 TABLET (300 MG TOTAL) BY MOUTH ONCE DAILY.                 Screenings

## 2020-03-24 NOTE — PROGRESS NOTES
Postponed Appointments    Patient: Gerda Lai  Date: 3/20/2020  Diagnosis: No diagnosis found.  MRN: [id number]    Spoke with patient due to therapy following updates regarding COVID-19 closely and taking every precaution to ensure the safety of our patients, staff and community.  In an abundance of caution and in an effort to help reduce risk and limit community spread, we have decided to temporarily postpone appointments for patients who may be at increased risk to attend in-person therapy or where therapy is not critically needed at this time. Plan of care and home exercise program were reviewed and patient has what they need to continue therapy at home. All patient questions were answered. Also stated to patient that we are exploring virtual methods of providing care and will be in touch over the next few weeks. Patient verbalized understanding to all.      Ebenezer Sanchez, PT  3/20/2020

## 2020-03-25 ENCOUNTER — DOCUMENTATION ONLY (OUTPATIENT)
Dept: REHABILITATION | Facility: HOSPITAL | Age: 85
End: 2020-03-25

## 2020-03-25 NOTE — PROGRESS NOTES
Outpatient Therapy Discharge Summary     Name: Gerda Lai  Clinic Number: [id number]  Therapy Diagnosis:        Encounter Diagnoses   Name Primary?   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 Patient to demonstrate full passive knee extension to 0 degrees LLE to enhance standing balance and stability  2. Patient to demonstrate increased passive right hip flexion to 105 degrees to improve standing stability   3. Patient to demonstrate increased hip flexor and quad flexibility BLE with increased passive hip extension to facilitate improved standing stability   4. Patient will appreciate elimination of knee pain to palpation BLE  5. Indep with HEP     Long Term Goals: 12 weeks  1. Pt will demonstrate increased right hip flexion AROM by 10 degrees or greater to improve walking and standing stability  2. Pt will demonstrate increased BLE knee extension AROM to -10 degrees or less.   3. Pt will demonstrate increased quad strength to 4-/5 or greater to enhance stability   4. Pt will demonstrate increased gluteal strength strength to 3-/5 or greater to enhance standing stability and reduce fall risk.   5  Demonstrate weight bearing w/o Trendelenburg RLE   6. Restore neuromuscular response to unilateral stability     Discharge reason: Patient had stopped attending therapy sessions for her knee pain and balance issues under my care.  She consulted MD for right shoulder pain. PT for the shoulder was ordered and patient evaluated  8/1/2019 by another  PT.      Plan   This patient is discharged from Physical Therapy    Albert Mills PT

## 2020-03-26 ENCOUNTER — PATIENT MESSAGE (OUTPATIENT)
Dept: CARDIOLOGY | Facility: CLINIC | Age: 85
End: 2020-03-26

## 2020-03-27 ENCOUNTER — PATIENT OUTREACH (OUTPATIENT)
Dept: OTHER | Facility: OTHER | Age: 85
End: 2020-03-27

## 2020-03-27 NOTE — PROGRESS NOTES
Digital Medicine: Health  Follow-Up    Ms. Lorenz reports that she's doing well. She said that she was feeling a little dizzy earlier after getting aggravated with her computer. She's doing better now. Patient also mentioned that she hadn't had lunch yet (could potentially be why she's feelign dizzy).    The history is provided by the patient.     Follow Up  Follow-up reason(s): reading review      Readings are trending down due to lifestyle change and medication adherence.    Routine Education Topics: eating patterns and physical activity      Patient is feeling better about her BP, but is still nervous that it's too high. She's continuing to work on staying healthy with her diet and exercise.       INTERVENTION(S)  encouragement/support    PLAN  patient verbalizes understanding      There are no preventive care reminders to display for this patient.    Last 5 Patient Entered Readings                                      Current 30 Day Average: 172/71     Recent Readings 3/26/2020 3/26/2020 3/25/2020 3/25/2020 3/24/2020    SBP (mmHg) 160 160 168 168 166    DBP (mmHg) 70 70 73 73 66    Pulse 55 55 56 56 58            Diet Screening   Patient reports eating or drinking the following: fresh vegetables    Patient reports that she's doing much better with her diet since we last spoke. She said that she's been getting food from [employer] pretty often since they have fresh veggies and baked meats. She'll get a few different things like spinach, fresh fruit, carrots, avocado, hearts of palm, broccoli and cauliflower, tomatoes, chicken, and crab to last her abut 3 meals.     Patient mentioned to me that last time she spoke to PharmD they discussed an alert reading. She thought it was from a burger she made, but after thinking about it, she thinks the elevated reading was from eating some [employer] deli ham and turkey. She hasn't eaten it since and her BP has been lower. We discussed trying to find deli meat that's  labeled low sodium and reading the food label. Also discussed how turkey and chicken are better options than ham.     Physical Activity Screening   When asked if exercising, patient responded: yes    Patient participates in the following activities: physical therapy/rehab    Patient reports that she's been doing well with PT. Right now, she's unable to go to the clinic, but she's been sticking with her HEP. She said she does it every morning before getting out of bed, and it usually takes around 20 min. She admits feeling better and stronger from this.

## 2020-03-31 ENCOUNTER — PATIENT OUTREACH (OUTPATIENT)
Dept: OTHER | Facility: OTHER | Age: 85
End: 2020-03-31

## 2020-03-31 NOTE — PROGRESS NOTES
"Digital Medicine: Clinician Follow-Up    Called Ms. Lorenz for hypertension follow-up. She reports swelling in her feet and ankles "that has come to be pretty dramatic." She is attributing this to the felodipine. She confirmed increasing labetalol to 2 tablets (200 mg) at night on 3/24/20 and still taking 1 labetalol tablet (100 mg) in the morning along with following her other prescriptions. She reports that she has not been feeling as horrible or with the dizziness as before. She complains of nighttime urination which she attributes to the doxazosin but admits to drinking more water in the evening while watching TV. She also has a complaint of mouth dryness or a film on her teeth which she also attributes to doxazosin.    The history is provided by the patient. No  was used.     Follow Up  Follow-up reason(s): reading review      Readings are trending down due to medication adherence.    Is patient tolerating med change?:  Yes      INTERVENTION(S)  encouragement/support    PLAN  patient verbalizes understanding and continue monitoring    Current BP avg of 173/71 is uncontrolled, does not meet goal of <150/90.  Reviewed readings: SBP/DBP is trending downwards where DBP continues to be well-controlled. SBP is consistently elevated above goal. No new readings since increasing labetalol to 200 mg nightly.     Continue current regimen.  Requested measurements to monitor if BP has improved with increase to labetalol.  Consider decreasing felodipine due to swelling and PRN dosing of diuretic for better BP control.     Follow-up in 1 week.       There are no preventive care reminders to display for this patient.    Last 5 Patient Entered Readings                                      Current 30 Day Average: 173/71     Recent Readings 3/27/2020 3/27/2020 3/26/2020 3/26/2020 3/25/2020    SBP (mmHg) 175 175 160 160 168    DBP (mmHg) 68 68 70 70 73    Pulse 56 56 55 55 56             Hypertension " Medications             doxazosin (CARDURA) 1 MG tablet Take 1 tablet (1 mg total) by mouth every evening.    felodipine (PLENDIL) 2.5 MG Tb24 Take 2 tablets (5 mg total) by mouth once daily. To reduce symptoms, take 2.5 mg in the morning and 2.5 mg in the early evening.    labetalol (NORMODYNE) 100 MG tablet Take 1 tablet (100 mg total) by mouth every 12 (twelve) hours. Patient taking differently: Taking 1 tablet (100 mg) every morning and 2 tablets (200 mg) every evening.    TEKTURNA 300 mg Tab TAKE 1 TABLET (300 MG TOTAL) BY MOUTH ONCE DAILY.                 Screenings

## 2020-04-06 DIAGNOSIS — I10 HYPERTENSION, ESSENTIAL: ICD-10-CM

## 2020-04-06 RX ORDER — DOXAZOSIN 1 MG/1
TABLET ORAL
Qty: 30 TABLET | Refills: 0 | Status: SHIPPED | OUTPATIENT
Start: 2020-04-06 | End: 2020-04-16 | Stop reason: SDUPTHER

## 2020-04-07 ENCOUNTER — PATIENT OUTREACH (OUTPATIENT)
Dept: OTHER | Facility: OTHER | Age: 85
End: 2020-04-07

## 2020-04-07 NOTE — PROGRESS NOTES
Digital Medicine: Clinician Follow-Up    Patient called to say that she needed a refill on doxazosin. Her current Rx has no refills. She also shares that her blood pressures are doing much better. She is very excited about the readings she has recorded over the past 3 days. She has no complaints about ADRs to medications.    The history is provided by the patient. No  was used.     Follow Up  Follow-up reason(s): reading review      Readings are trending down due to lifestyle change and medication adherence.        INTERVENTION(S)  encouragement/support    PLAN  patient verbalizes understanding and continue monitoring    Current BP avg of 166/70 is uncontrolled, mills snot meet goal of <150/90.  Reviewed readings: trending downwards where SBP is starting to meet goal.  Encouraged patient to continue to take medications as discussed.   Confirmed that doxazosin Rx was renewed by PCP [facility].    Continue current regimen.     Follow-up in 1-2 weeks.       There are no preventive care reminders to display for this patient.    Last 5 Patient Entered Readings                                      Current 30 Day Average: 166/70     Recent Readings 4/7/2020 4/7/2020 4/6/2020 4/6/2020 4/4/2020    SBP (mmHg) 144 144 145 145 166    DBP (mmHg) 67 67 63 63 76    Pulse 56 56 59 59 60             Hypertension Medications             doxazosin (CARDURA) 1 MG tablet TAKE 2 TABLETS (2 MG TOTAL) BY MOUTH EVERY EVENING.    felodipine (PLENDIL) 2.5 MG Tb24 Take 2 tablets (5 mg total) by mouth once daily. To reduce symptoms, take 2.5 mg in the morning and 2.5 mg in the early evening.    labetalol (NORMODYNE) 100 MG tablet Take 1 tablet (100 mg total) by mouth every 12 (twelve) hours.    TEKTURNA 300 mg Tab TAKE 1 TABLET (300 MG TOTAL) BY MOUTH ONCE DAILY.                 Screenings

## 2020-04-08 DIAGNOSIS — I10 HYPERTENSION, ESSENTIAL: ICD-10-CM

## 2020-04-08 RX ORDER — LORAZEPAM 0.5 MG/1
TABLET ORAL
Qty: 30 TABLET | Refills: 0 | Status: SHIPPED | OUTPATIENT
Start: 2020-04-08 | End: 2020-04-14

## 2020-04-08 NOTE — TELEPHONE ENCOUNTER
Please inform pt that we will call in the prescription but as we discussed in the past, she needs to find a new PCP, thanks

## 2020-04-13 ENCOUNTER — TELEPHONE (OUTPATIENT)
Dept: REHABILITATION | Facility: HOSPITAL | Age: 85
End: 2020-04-13

## 2020-04-14 ENCOUNTER — PATIENT OUTREACH (OUTPATIENT)
Dept: OTHER | Facility: OTHER | Age: 85
End: 2020-04-14

## 2020-04-14 DIAGNOSIS — I10 HYPERTENSION, ESSENTIAL: ICD-10-CM

## 2020-04-14 RX ORDER — LORAZEPAM 0.5 MG/1
TABLET ORAL
Qty: 30 TABLET | Refills: 0 | Status: SHIPPED | OUTPATIENT
Start: 2020-04-14 | End: 2020-05-25

## 2020-04-14 NOTE — PROGRESS NOTES
Digital Medicine: Clinician Follow-Up    Called patient for hypertension follow-up. She reports not recording her BP today due to device needing to be charged. She will take reading once charge is complete.     The history is provided by the patient. No  was used.     Follow Up  Follow-up reason(s): reading review      Readings are missing.   patient reminder needed.      INTERVENTION(S)  encouragement/support    PLAN  patient verbalizes understanding and continue monitoring    Current 30-day BP avg of 164/70 is uncontrolled, does not meet goal of <150/90.  Reviewed readings: Trending downwards. DBP continues to be well-controlled. SBP is better controlled over last few readings.   Encouraged to continue elevating legs to reduce/prevent swelling caused by CCB.    Continue current regimen.    Follow-up next week.      There are no preventive care reminders to display for this patient.    Last 5 Patient Entered Readings                                      Current 30 Day Average: 164/70     Recent Readings 4/7/2020 4/7/2020 4/6/2020 4/6/2020 4/4/2020    SBP (mmHg) 144 144 145 145 166    DBP (mmHg) 67 67 63 63 76    Pulse 56 56 59 59 60             Hypertension Medications             doxazosin (CARDURA) 1 MG tablet TAKE 2 TABLETS (2 MG TOTAL) BY MOUTH EVERY EVENING.    felodipine (PLENDIL) 2.5 MG Tb24 Take 2 tablets (5 mg total) by mouth once daily. To reduce symptoms, take 2.5 mg in the morning and 2.5 mg in the early evening.    labetalol (NORMODYNE) 100 MG tablet Take 1 tablet (100 mg total) by mouth every 12 (twelve) hours.    TEKTURNA 300 mg Tab TAKE 1 TABLET (300 MG TOTAL) BY MOUTH ONCE DAILY.                 Screenings

## 2020-04-15 DIAGNOSIS — I10 HYPERTENSION, ESSENTIAL: ICD-10-CM

## 2020-04-15 RX ORDER — LORAZEPAM 0.5 MG/1
TABLET ORAL
Qty: 30 TABLET | Refills: 0 | OUTPATIENT
Start: 2020-04-15

## 2020-04-15 NOTE — TELEPHONE ENCOUNTER
----- Message from Josefina Harrison sent at 4/15/2020 10:15 AM CDT -----  Contact: self   Requesting an RX refill or new RX.  Is this a refill or new RX:    RX name and strength: LORazepam (ATIVAN) 0.5 MG tablet  Directions (copy/paste from chart):  TAKE 1/2 TABLET BY MOUTH TWICE A DAY FOR SEVERE ANXIETY  Is this a 30 day or 90 day RX:    Local pharmacy or mail order pharmacy:  local  Pharmacy name and phone # (copy/paste from chart): [facility]/pharmacy #[id number] - [place] - [address] [phone] (Phone)  [phone] (Fax)    Comments:  Patient state the pharmacy rep told her they never receive refill request. Please advise.

## 2020-04-16 ENCOUNTER — CLINICAL SUPPORT (OUTPATIENT)
Dept: REHABILITATION | Facility: HOSPITAL | Age: 85
End: 2020-04-16
Attending: INTERNAL MEDICINE
Payer: MEDICARE

## 2020-04-16 DIAGNOSIS — I10 HYPERTENSION, ESSENTIAL: ICD-10-CM

## 2020-04-16 DIAGNOSIS — R26.81 UNSTEADY GAIT: Primary | ICD-10-CM

## 2020-04-16 PROCEDURE — 97110 THERAPEUTIC EXERCISES: CPT | Mod: PO

## 2020-04-16 PROCEDURE — 97530 THERAPEUTIC ACTIVITIES: CPT | Mod: 95,PO

## 2020-04-16 RX ORDER — DOXAZOSIN 1 MG/1
TABLET ORAL
Qty: 30 TABLET | Refills: 0 | Status: SHIPPED | OUTPATIENT
Start: 2020-04-16 | End: 2020-04-29

## 2020-04-16 NOTE — PROGRESS NOTES
Physical Therapy Daily Treatment Note     Name: Gerda Lai  Clinic Number: [id number]    Patient unsafe for in-person office visit due to high risk co-morbidities, probability of infection, to minimize exposure, due to pt expressing symptoms, and/or due to government mandated quarantine.  This patient: (1) was informed that telehealth visits are now available congruent with guidelines set by state practice acts & CMS; (2) initiated scheduling of this type of visit; and (3) gave verbal consent to participate in such.  The patient & provider used Epic Haiku using both video & audio synchronous services to complete the visit.      Therapy Diagnosis:   Encounter Diagnosis   Name Primary?   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  She stated that she performs them first thing in the morning so she will not forget and to get her joints moving.  She was compliant with home exercise program.  Response to previous treatment: Pt did not remember any issues following her last treatment on 3/13/2020  Functional change: nothing specific    Pain:  "3/10  Location: bilateral neck      Objective     Gerda received therapeutic exercises to develop ROM for 15 minutes including:  R shoulder active assistive range of motion into external rotation and flexion  We also verbally reviewed the home exercises she performs in supine each day.    Gerda participated in neuromuscular re-education activities to improve: Balance, Coordination and Proprioception for 15 minutes. The following activities were included:  Pt was encouraged to also include standing activities in her HEP much like she has performed in the clinic.  She was instructed to use her counter top, as she did not the clinic, and perform side stepping in her kitchen.  She was also encouraged to perform sitting and standing marching to assist with lifting her leg to step up a 5 inch step into her laundry room.  We then dicussed what she can use to perform the 2" and 4" step up she performed in the clinic.  It was suggested that she try an inch and 3/4 ot 3 1/2 inch board that she could get from the hardware store.        Home Exercises Provided and Patient Education Provided     Education provided:   - review of HEP and instruction to add some closed chain exercises to her home program as indicated above.    Written Home Exercises Provided: Patient instructed to cont prior HEP.  Exercises were reviewed and Gerda was able to demonstrate them prior to the end of the session.  Gerda demonstrated good  understanding of the education provided.     See EMR under Patient Instructions for exercises provided prior visit.    Assessment     Pt appears to understand her supine home exercises well, but would show greater progress in her functional deficits if she were to include closed chain activities also  Gerda is progressing well towards her goals.   Pt prognosis is Fair.     Pt will continue to benefit from skilled outpatient physical therapy to address the deficits listed in the problem list box on initial " evaluation, provide pt/family education and to maximize pt's level of independence in the home and community environment.     Pt's spiritual, cultural and educational needs considered and pt agreeable to plan of care and goals.     Anticipated barriers to physical therapy: COVID 19    Goals:   Short Term Goals: 4 weeks   Pt will be independent in a HEP to address their functional deficits related to balance   Pt will improve her TUG time to 18 seconds to show improvement in dynamic standing balance  Improve Garcia balance scale score to 40/56  to show improvement in dynamic standing balance      Long Term Goals: 8 weeks   Pt will improve her TUG time to 14 seconds to show improvement in dynamic standing balance  Improve Garcia balance scale score to 42/56  to show improvement in dynamic standing balance     Plan     Progress Pt's exercise program to improve overall strength and endurance to assist with balance and and improved overall function. Pt will be seen 1x/week via telehealth and resume in clinic Physical Therapy treatments 2x/week when safe and possible to do so.    Ebenezer Sanchez, PT

## 2020-04-17 DIAGNOSIS — I10 HYPERTENSION, ESSENTIAL: ICD-10-CM

## 2020-04-17 RX ORDER — FELODIPINE 2.5 MG/1
TABLET, EXTENDED RELEASE ORAL
Qty: 90 TABLET | Refills: 1 | Status: SHIPPED | OUTPATIENT
Start: 2020-04-17 | End: 2020-06-15 | Stop reason: DRUGHIGH

## 2020-04-17 NOTE — PLAN OF CARE
Physical Therapy Daily Treatment Note     Name: Gerda Lai  Clinic Number: [id number]    Patient unsafe for in-person office visit due to high risk co-morbidities, probability of infection, to minimize exposure, due to pt expressing symptoms, and/or due to government mandated quarantine.  This patient: (1) was informed that telehealth visits are now available congruent with guidelines set by state practice acts & CMS; (2) initiated scheduling of this type of visit; and (3) gave verbal consent to participate in such.  The patient & provider used Epic Haiku using both video & audio synchronous services to complete the visit.      Therapy Diagnosis:   Encounter Diagnosis   Name Primary?    Unsteady gait Yes     Physician: Cesar Burroughs MD    Visit Date: 4/16/2020  Patient Location:  Visit type: Virtual visit with synchronous audio and video through [employer]    Physician Orders:Eval and Treat   Medical Diagnosis from Referral:   M75.101,M12.811,M12.812,M75.102 (ICD-10-CM) - Rotator cuff tear arthropathy of both shoulders   M47.816 (ICD-10-CM) - Lumbar spondylosis   M47.812 (ICD-10-CM) - Cervical spondylosis   M17.0 (ICD-10-CM) - Osteoarthritis of both knees, unspecified osteoarthritis type         Evaluation Date: 2/6/2020  Authorization Period Expiration: 01/08/2021  Plan of Care Expiration: 6/05/2020  Visit # / Visits authorized: 10/20       Time Connection Initiated: 2:25 pm  Time Connection Terminated: 3:00 pm  Total Billable Time: 35 minutes    Precautions: Standard and fall    Subjective     Pt reports: That she has continued with the portion of her HEP that she can do while in bed.  She stated that she performs them first thing in the morning so she will not forget and to get her joints moving.  She was compliant with home exercise program.  Response to previous treatment: Pt did not remember any issues following her last treatment on 3/13/2020  Functional change: nothing specific    Pain:  "3/10  Location: bilateral neck      Objective     Gerda received therapeutic exercises to develop ROM for 15 minutes including:  R shoulder active assistive range of motion into external rotation and flexion  We also verbally reviewed the home exercises she performs in supine each day.    Gerda participated in neuromuscular re-education activities to improve: Balance, Coordination and Proprioception for 15 minutes. The following activities were included:  Pt was encouraged to also include standing activities in her HEP much like she has performed in the clinic.  She was instructed to use her counter top, as she did not the clinic, and perform side stepping in her kitchen.  She was also encouraged to perform sitting and standing marching to assist with lifting her leg to step up a 5 inch step into her laundry room.  We then dicussed what she can use to perform the 2" and 4" step up she performed in the clinic.  It was suggested that she try an inch and 3/4 ot 3 1/2 inch board that she could get from the hardware store.        Home Exercises Provided and Patient Education Provided     Education provided:   - review of HEP and instruction to add some closed chain exercises to her home program as indicated above.    Written Home Exercises Provided: Patient instructed to cont prior HEP.  Exercises were reviewed and Gerda was able to demonstrate them prior to the end of the session.  Gerda demonstrated good  understanding of the education provided.     See EMR under Patient Instructions for exercises provided prior visit.    Assessment     Pt appears to understand her supine home exercises well, but would show greater progress in her functional deficits if she were to include closed chain activities also  Gerda is progressing well towards her goals.   Pt prognosis is Fair.     Pt will continue to benefit from skilled outpatient physical therapy to address the deficits listed in the problem list box on initial " evaluation, provide pt/family education and to maximize pt's level of independence in the home and community environment.     Pt's spiritual, cultural and educational needs considered and pt agreeable to plan of care and goals.     Anticipated barriers to physical therapy: COVID 19    Goals:   Short Term Goals: 4 weeks   Pt will be independent in a HEP to address their functional deficits related to balance   Pt will improve her TUG time to 18 seconds to show improvement in dynamic standing balance  Improve Garcia balance scale score to 40/56  to show improvement in dynamic standing balance      Long Term Goals: 8 weeks   Pt will improve her TUG time to 14 seconds to show improvement in dynamic standing balance  Improve Garcia balance scale score to 42/56  to show improvement in dynamic standing balance     Plan     Progress Pt's exercise program to improve overall strength and endurance to assist with balance and and improved overall function. Pt will be seen 1x/week via telehealth and resume in clinic Physical Therapy treatments 2x/week when safe and possible to do so.    Ebenezer Sanchez, PT

## 2020-04-21 ENCOUNTER — TELEPHONE (OUTPATIENT)
Dept: DERMATOLOGY | Facility: CLINIC | Age: 85
End: 2020-04-21

## 2020-04-21 NOTE — TELEPHONE ENCOUNTER
Pt had mohs surgery on 3/3 on L lateral thigh for SCC. Pt c/o red bump on incision line. Most likely suture granuloma and recommend warm wet compress 10 min 3 times a day. Advised to call if condition worsens. Pt expressed verbal understanding.

## 2020-04-26 RX ORDER — ALISKIREN 300 MG/1
TABLET, FILM COATED ORAL
Qty: 90 TABLET | Refills: 2 | Status: SHIPPED | OUTPATIENT
Start: 2020-04-26 | End: 2020-07-06 | Stop reason: SDUPTHER

## 2020-04-29 DIAGNOSIS — I10 HYPERTENSION, ESSENTIAL: ICD-10-CM

## 2020-04-29 RX ORDER — DOXAZOSIN 1 MG/1
TABLET ORAL
Qty: 30 TABLET | Refills: 0 | Status: SHIPPED | OUTPATIENT
Start: 2020-04-29 | End: 2020-05-12

## 2020-04-30 ENCOUNTER — PATIENT OUTREACH (OUTPATIENT)
Dept: OTHER | Facility: OTHER | Age: 85
End: 2020-04-30

## 2020-04-30 NOTE — PROGRESS NOTES
Digital Medicine: Clinician Follow-Up    Called Ms. Lorenz for hypertension follow-up. She reports doing well and is very excited about her reading recorded on yesterday. She expresses concerns with walking but is still doing her daily exercises. She continues to stay in touch with rehab and is following their instructions.     The history is provided by the patient. No  was used.     Follow Up  Follow-up reason(s): reading review      Readings are trending down       INTERVENTION(S)  encouragement/support    PLAN  patient verbalizes understanding and continue monitoring    Current 30-day BP avg of 151/67 is close to BP goal of <150/90.  Reviewed readings: trending downwards where DBP is consistently controlled and SBP is intermittently controlled where readings meet or are close to goal.    Continue current regimen.    Follow-up in 2 weeks.       There are no preventive care reminders to display for this patient.    Last 5 Patient Entered Readings                                      Current 30 Day Average: 151/67     Recent Readings 4/29/2020 4/29/2020 4/28/2020 4/28/2020 4/27/2020    SBP (mmHg) 134 134 165 165 154    DBP (mmHg) 63 63 67 67 69    Pulse 64 64 63 63 62             Hypertension Medications             aliskiren (TEKTURNA) 300 MG Tab TAKE 1 TABLET (300 MG TOTAL) BY MOUTH ONCE DAILY.    doxazosin (CARDURA) 1 MG tablet TAKE 2 TABLETS (2 MG TOTAL) BY MOUTH EVERY EVENING.    felodipine (PLENDIL) 2.5 MG Tb24 TAKE 1 TABLET BY MOUTH EVERY DAY    labetalol (NORMODYNE) 100 MG tablet Take 1 tablet (100 mg total) by mouth every 12 (twelve) hours.                 Screenings

## 2020-05-12 DIAGNOSIS — I10 HYPERTENSION, ESSENTIAL: ICD-10-CM

## 2020-05-12 RX ORDER — DOXAZOSIN 1 MG/1
TABLET ORAL
Qty: 30 TABLET | Refills: 0 | Status: SHIPPED | OUTPATIENT
Start: 2020-05-12 | End: 2020-05-27 | Stop reason: SDUPTHER

## 2020-05-18 ENCOUNTER — OFFICE VISIT (OUTPATIENT)
Dept: DERMATOLOGY | Facility: CLINIC | Age: 85
End: 2020-05-18
Payer: MEDICARE

## 2020-05-18 DIAGNOSIS — L81.4 LENTIGO: ICD-10-CM

## 2020-05-18 DIAGNOSIS — Z85.828 PERSONAL HISTORY OF SKIN CANCER: ICD-10-CM

## 2020-05-18 DIAGNOSIS — D22.9 NEVUS: ICD-10-CM

## 2020-05-18 DIAGNOSIS — L82.1 SK (SEBORRHEIC KERATOSIS): ICD-10-CM

## 2020-05-18 DIAGNOSIS — T81.89XA RETAINED SUTURE, INITIAL ENCOUNTER: ICD-10-CM

## 2020-05-18 DIAGNOSIS — L90.5 SCAR: ICD-10-CM

## 2020-05-18 DIAGNOSIS — Z18.9 RETAINED SUTURE, INITIAL ENCOUNTER: ICD-10-CM

## 2020-05-18 DIAGNOSIS — D18.01 CHERRY ANGIOMA: ICD-10-CM

## 2020-05-18 DIAGNOSIS — L57.0 AK (ACTINIC KERATOSIS): Primary | ICD-10-CM

## 2020-05-18 PROCEDURE — 99214 OFFICE O/P EST MOD 30 MIN: CPT | Mod: 25,S$PBB,, | Performed by: DERMATOLOGY

## 2020-05-18 PROCEDURE — 99212 OFFICE O/P EST SF 10 MIN: CPT | Mod: PBBFAC,PO,25 | Performed by: DERMATOLOGY

## 2020-05-18 PROCEDURE — 17000 PR DESTRUCTION(LASER SURGERY,CRYOSURGERY,CHEMOSURGERY),PREMALIGNANT LESIONS,FIRST LESION: ICD-10-PCS | Mod: S$PBB,,, | Performed by: DERMATOLOGY

## 2020-05-18 PROCEDURE — 99999 PR PBB SHADOW E&M-EST. PATIENT-LVL II: ICD-10-PCS | Mod: PBBFAC,,, | Performed by: DERMATOLOGY

## 2020-05-18 PROCEDURE — 17000 DESTRUCT PREMALG LESION: CPT | Mod: S$PBB,,, | Performed by: DERMATOLOGY

## 2020-05-18 PROCEDURE — 17003 DESTRUCT PREMALG LES 2-14: CPT | Mod: PBBFAC,PO | Performed by: DERMATOLOGY

## 2020-05-18 PROCEDURE — 99214 PR OFFICE/OUTPT VISIT, EST, LEVL IV, 30-39 MIN: ICD-10-PCS | Mod: 25,S$PBB,, | Performed by: DERMATOLOGY

## 2020-05-18 PROCEDURE — 17003 DESTRUCT PREMALG LES 2-14: CPT | Mod: S$PBB,,, | Performed by: DERMATOLOGY

## 2020-05-18 PROCEDURE — 17000 DESTRUCT PREMALG LESION: CPT | Mod: PBBFAC,PO | Performed by: DERMATOLOGY

## 2020-05-18 PROCEDURE — 99999 PR PBB SHADOW E&M-EST. PATIENT-LVL II: CPT | Mod: PBBFAC,,, | Performed by: DERMATOLOGY

## 2020-05-18 PROCEDURE — 17003 DESTRUCTION, PREMALIGNANT LESIONS; SECOND THROUGH 14 LESIONS: ICD-10-PCS | Mod: S$PBB,,, | Performed by: DERMATOLOGY

## 2020-05-18 NOTE — PROGRESS NOTES
Subjective:       Patient ID:  Gerda Lai is a 87 y.o. female who presents for   Chief Complaint   Patient presents with    Skin Check     Pt here today for TBSE  Pt also c/o lesion to left temple x 3 weeks. No pain or bleeding  Pt also c/o lesion to collar bone x 6 weeks. Itches. No bleeding or pain.  This is a high risk patient here to check for the development of new lesions.        Review of Systems   Skin: Positive for itching. Negative for daily sunscreen use, activity-related sunscreen use and tendency to form keloidal scars.   Hematologic/Lymphatic: Bruises/bleeds easily.        Objective:    Physical Exam   Constitutional: She appears well-developed and well-nourished. No distress.   Neurological: She is alert and oriented to person, place, and time. She is not disoriented.   Psychiatric: She has a normal mood and affect.   Skin:   Areas Examined (abnormalities noted in diagram):   Scalp / Hair Palpated and Inspected  Head / Face Inspection Performed  Neck Inspection Performed  Chest / Axilla Inspection Performed  Abdomen Inspection Performed  Genitals / Buttocks / Groin Inspection Performed  Back Inspection Performed  RUE Inspected  LUE Inspection Performed  RLE Inspected  LLE Inspection Performed  Nails and Digits Inspection Performed                       Diagram Legend     Erythematous scaling macule/papule c/w actinic keratosis       Vascular papule c/w angioma      Pigmented verrucoid papule/plaque c/w seborrheic keratosis      Yellow umbilicated papule c/w sebaceous hyperplasia      Irregularly shaped tan macule c/w lentigo     1-2 mm smooth white papules consistent with Milia      Movable subcutaneous cyst with punctum c/w epidermal inclusion cyst      Subcutaneous movable cyst c/w pilar cyst      Firm pink to brown papule c/w dermatofibroma      Pedunculated fleshy papule(s) c/w skin tag(s)      Evenly pigmented macule c/w junctional nevus     Mildly variegated pigmented, slightly  irregular-bordered macule c/w mildly atypical nevus      Flesh colored to evenly pigmented papule c/w intradermal nevus       Pink pearly papule/plaque c/w basal cell carcinoma      Erythematous hyperkeratotic cursted plaque c/w SCC      Surgical scar with no sign of skin cancer recurrence      Open and closed comedones      Inflammatory papules and pustules      Verrucoid papule consistent consistent with wart     Erythematous eczematous patches and plaques     Dystrophic onycholytic nail with subungual debris c/w onychomycosis     Umbilicated papule    Erythematous-base heme-crusted tan verrucoid plaque consistent with inflamed seborrheic keratosis     Erythematous Silvery Scaling Plaque c/w Psoriasis     See annotation      Assessment / Plan:        AK (actinic keratosis)  Cryosurgery Procedure Note    Verbal consent from the patient is obtained including, but not limited to, risk of hypopigmentation/hyperpigmentation, scar, recurrence of lesion. The patient is aware of the precancerous quality and need for treatment of these lesions. Liquid nitrogen cryosurgery is applied to the 7 actinic keratoses, as detailed in the physical exam, to produce a freeze injury. The patient is aware that blisters may form and is instructed on wound care with gentle cleansing and use of vaseline ointment to keep moist until healed. The patient is supplied a handout on cryosurgery and is instructed to call if lesions do not completely resolve.    Lentigo  This is a benign hyperpigmented sun induced lesion. Daily sun protection will reduce the number of new lesions. Treatment of these benign lesions are considered cosmetic.  The nature of sun-induced photo-aging and skin cancers is discussed.  Sun avoidance, protective clothing, and the use of 30-SPF sunscreens is advised. Observe closely for skin damage/changes, and call if such occurs.    Nevus  Discussed ABCDE's of nevi.  Monitor for new mole or moles that are becoming bigger,  darker, irritated, or developing irregular borders. Brochure provided.    SK (seborrheic keratosis)  These are benign inherited growths without a malignant potential. Reassurance given to patient. No treatment is necessary.     Cherry angioma  These are benign vascular lesions that are inherited.  Treatment is not necessary.    Personal history of skin cancer  Scar  Pt with history of non melanoma skin cancer  Total body skin examination performed today including at least 12 points as noted in physical examination. No suspicious lesions noted.    Retained suture, initial encounter  after verbal consent including risk of infection, scar, recurrence, need for additional treatment of site. Area prepped with alcohol, anesthetized with approximately 1.0cc of 1% lidocaine with epinephrine. Lesion incised with 11 blade and suture extracted.  No complications. Dressing applied. Wound care explained.               Follow up in about 6 months (around 11/18/2020).

## 2020-05-20 ENCOUNTER — PATIENT OUTREACH (OUTPATIENT)
Dept: OTHER | Facility: OTHER | Age: 85
End: 2020-05-20

## 2020-05-20 NOTE — PROGRESS NOTES
Digital Medicine: Health  Follow-Up    Ms. Lorenz reports that she's doing well.     The history is provided by the patient.     Follow Up  Follow-up reason(s): reading review and routine education      Readings are trending down   Routine Education Topics: eating patterns and physical activity        INTERVENTION(S)  recommend physical activity and encouragement/support    PLAN  patient verbalizes understanding      There are no preventive care reminders to display for this patient.    Last 5 Patient Entered Readings                                      Current 30 Day Average: 157/66     Recent Readings 5/14/2020 5/14/2020 5/10/2020 5/10/2020 5/8/2020    SBP (mmHg) 149 149 162 162 178    DBP (mmHg) 65 65 65 65 72    Pulse 59 59 67 67 66            Diet Screening   She has the following dietary restrictions: low sodium dietShe has meals prepared by family, has meals prepared by friends and utilizes a .      She finds cooking difficult.      Eating style: family members have different taste preferences    Patient reports that she tries to eat well when she can. She doesn't cook for herself except for breakfast, so she normally relies on restaurants or family and friends giving meals to her. She said that she knows that salt will spike her BP, and she tries to avoid it when she can.    Physical Activity Screening   When asked if exercising, patient responded: yes  Patient has the following chronic pain: arthritis    Patient participates in the following activities: physical therapy/rehab and walking    She identified the following barriers to physical activity: limited mobility, pain/injury/recent surgery, unable to get to gym/afford it and COVID-19    Patient reports that she still does her HEP every morning to stay strong.. However, she mentioned that she wants to do more with PA. We discussed different options in her home like walking during commercials when watching TV. She thinks she'd like to  start walking around the kitchen counter (a difficult task for her), but she just has to remember to do it.    Patient would ideally love to get back to [facility] to use the pool. She's comfortable there and feels safe doing pool exercises.         Social Isolation Screening   Patient reports that she's been feeling a little depressed from isolation. The last week she's been socializing a bit more by meeting up with friends for picnics, etc. She's also trying to stay connected on the phone with people. Patient is excited to start filling up her calendar more as quarentine is lifted.

## 2020-05-22 PROCEDURE — 99457 RPM TX MGMT 1ST 20 MIN: CPT | Mod: S$PBB,,, | Performed by: FAMILY MEDICINE

## 2020-05-22 PROCEDURE — 99457 PR MONITORING, PHYSIOL PARAM, REMOTE, 1ST 20 MINS, PER MONTH: ICD-10-PCS | Mod: S$PBB,,, | Performed by: FAMILY MEDICINE

## 2020-05-24 DIAGNOSIS — I10 HYPERTENSION, ESSENTIAL: ICD-10-CM

## 2020-05-25 ENCOUNTER — OFFICE VISIT (OUTPATIENT)
Dept: RHEUMATOLOGY | Facility: CLINIC | Age: 85
End: 2020-05-25
Payer: MEDICARE

## 2020-05-25 VITALS
DIASTOLIC BLOOD PRESSURE: 69 MMHG | SYSTOLIC BLOOD PRESSURE: 128 MMHG | HEIGHT: 64 IN | HEART RATE: 59 BPM | WEIGHT: 141.13 LBS | BODY MASS INDEX: 24.1 KG/M2

## 2020-05-25 DIAGNOSIS — M85.80 OSTEOPENIA, UNSPECIFIED LOCATION: ICD-10-CM

## 2020-05-25 DIAGNOSIS — M75.102 ROTATOR CUFF TEAR ARTHROPATHY OF BOTH SHOULDERS: ICD-10-CM

## 2020-05-25 DIAGNOSIS — M12.812 ROTATOR CUFF TEAR ARTHROPATHY OF BOTH SHOULDERS: ICD-10-CM

## 2020-05-25 DIAGNOSIS — M75.101 RIGHT ROTATOR CUFF TEAR ARTHROPATHY: ICD-10-CM

## 2020-05-25 DIAGNOSIS — M12.811 ROTATOR CUFF TEAR ARTHROPATHY OF BOTH SHOULDERS: ICD-10-CM

## 2020-05-25 DIAGNOSIS — M12.811 RIGHT ROTATOR CUFF TEAR ARTHROPATHY: ICD-10-CM

## 2020-05-25 DIAGNOSIS — M47.812 CERVICAL SPONDYLOSIS: Primary | ICD-10-CM

## 2020-05-25 DIAGNOSIS — M75.101 ROTATOR CUFF TEAR ARTHROPATHY OF BOTH SHOULDERS: ICD-10-CM

## 2020-05-25 DIAGNOSIS — M17.0 PRIMARY OSTEOARTHRITIS OF BOTH KNEES: ICD-10-CM

## 2020-05-25 PROCEDURE — 99999 PR PBB SHADOW E&M-EST. PATIENT-LVL V: CPT | Mod: PBBFAC,,, | Performed by: INTERNAL MEDICINE

## 2020-05-25 PROCEDURE — 99999 PR PBB SHADOW E&M-EST. PATIENT-LVL V: ICD-10-PCS | Mod: PBBFAC,,, | Performed by: INTERNAL MEDICINE

## 2020-05-25 PROCEDURE — 99213 OFFICE O/P EST LOW 20 MIN: CPT | Mod: S$PBB,,, | Performed by: INTERNAL MEDICINE

## 2020-05-25 PROCEDURE — 99215 OFFICE O/P EST HI 40 MIN: CPT | Mod: PBBFAC | Performed by: INTERNAL MEDICINE

## 2020-05-25 PROCEDURE — 99213 PR OFFICE/OUTPT VISIT, EST, LEVL III, 20-29 MIN: ICD-10-PCS | Mod: S$PBB,,, | Performed by: INTERNAL MEDICINE

## 2020-05-25 RX ORDER — LORAZEPAM 0.5 MG/1
TABLET ORAL
Qty: 30 TABLET | Refills: 0 | Status: SHIPPED | OUTPATIENT
Start: 2020-05-25 | End: 2020-07-06 | Stop reason: SDUPTHER

## 2020-05-25 ASSESSMENT — ROUTINE ASSESSMENT OF PATIENT INDEX DATA (RAPID3)
PATIENT GLOBAL ASSESSMENT SCORE: 8
PSYCHOLOGICAL DISTRESS SCORE: 3.3
FATIGUE SCORE: 0
TOTAL RAPID3 SCORE: 6.11
MDHAQ FUNCTION SCORE: .7
PAIN SCORE: 8
WHEN YOU AWAKENED IN THE MORNING OVER THE LAST WEEK, PLEASE INDICATE THE AMOUNT OF TIME IT TAKES UNTIL YOU ARE AS LIMBER AS YOU WILL BE FOR THE DAY: 20 MINS
AM STIFFNESS SCORE: 1, YES

## 2020-05-25 NOTE — PROGRESS NOTES
"Subjective:       Patient ID: Gerda Lai is a 87 y.o. female.    Chief Complaint: Gen OA(knees right>left) rotator cuff tear arthropathy right> left ; s/p right ELZBIETA, mild OA left hip     HPI   20 min am stiffness.  Pain right shoulder, neck, knees. Used diclofenac gel once or twice a day  and helps some but gets over clothes. Now starting Aspercreme. No moist heat for  Neck. Gets great relief with PT but interrupted with Covid-19 would like to resume  Review of Systems   Constitutional: Negative for appetite change, fatigue, fever and unexpected weight change.   HENT: Negative for mouth sores.         Dry mouth  Hair loss   Eyes: Negative for visual disturbance.   Respiratory: Negative for cough, shortness of breath and wheezing.    Cardiovascular: Negative for chest pain and palpitations.   Gastrointestinal: Negative for abdominal pain, anal bleeding, blood in stool, constipation, diarrhea, nausea and vomiting.   Genitourinary: Negative for dysuria, frequency and urgency.   Musculoskeletal: Negative for arthralgias, back pain, gait problem, joint swelling, myalgias, neck pain and neck stiffness.   Skin: Negative for rash.   Neurological: Negative for weakness, numbness and headaches.   Hematological: Negative for adenopathy. Bruises/bleeds easily.   Psychiatric/Behavioral: Negative for sleep disturbance. The patient is not nervous/anxious.          Objective:   /69   Pulse (!) 59   Ht 5' 3.6" (1.615 m)   Wt 64 kg (141 lb 1.6 oz)   BMI 24.53 kg/m²      Physical Exam       Right Side Rheumatological Exam     The patient is tender to palpation of the shoulder    The patient has an enlarged shoulder, knee, 1st PIP, 2nd PIP, 3rd PIP, 4th PIP and 5th PIP    Muscle Strength (0-5 scale):  Deltoid:  4  Biceps: 5/5   Triceps:  5  : 4.8/5     Left Side Rheumatological Exam     The patient is tender to palpation of the shoulder.    The patient has an enlarged shoulder, knee, 1st PIP, 2nd PIP, 3rd PIP, 4th " PIP and 5th PIP.    Muscle Strength (0-5 scale):  Deltoid:  4.8  Biceps: 5/5   Triceps:  5  :  4.8/5       Musculoskeletal: She exhibits edema (1+).           Assessment/Plan         Cervical spondylosis  -     Ambulatory referral/consult to Physical/Occupational Therapy    Rotator cuff tear arthropathy of both shoulders  -     Ambulatory referral/consult to Physical/Occupational Therapy    Primary osteoarthritis of both knees  -     Ambulatory referral/consult to Physical/Occupational Therapy    Osteopenia, unspecified location    Right rotator cuff tear arthropathy       Problem List Items Addressed This Visit     Cervical spondylosis - Primary    Current Assessment & Plan     Neck rom and isometrics  Cervical Thermophore  Ref to PT         Relevant Orders    Ambulatory referral/consult to Physical/Occupational Therapy    Osteoarthritis    Relevant Orders    Ambulatory referral/consult to Physical/Occupational Therapy    Osteopenia    Overview     Aretha Snell MD 2/7/2019       Narrative     EXAMINATION:  DEXA BONE DENSITY SPINE HIP    CLINICAL HISTORY:  suspected osteoporosis; Asymptomatic menopausal state85 y/o female with no history of fractures.  She had a hysterectomy at 39 y/o.  She is taking 400 units of Vit D supplements.  She has a history of Breast Ca.  Pt had a right hip replacement.  She exercises daily and does not smoke.    TECHNIQUE:  DXA specification: [place] [id number].    Bone Mineral Density scanning was performed over the hip and lumbar spine. Review of the images confirms satisfactory positioning and technique.    COMPARISON:  Comparison study done on 04/21/2014. Lumbar spine BMD 1.478 g/cm2 and the T-score 3.9.  The Total Hip BMD 0.882 g/cm2 and the T-score -0.5.    FINDINGS:  Lumbar Spine: Lumbar bone mineral density L1-L4 is 1.502g/cm2, which is a T-score of 4.1. The Z-score is 7.0.    Total Hip: Total hip bone mineral density is 0.883g/cm2.  The T-score is  -0.5, and the Z-score is 1.9.    Femoral neck: Bone mineral density is 0.711g/cm2 and the T-score is -1.2 and the Z-score is 1.3 g/cm2.    There is a 12% risk of a major osteoporotic fracture and a 3.1% risk of hip fracture in the next 10 years (FRAX).    Compared with previous DXA, BMD at the lumbar spine has increased by 1.6%, and the BMD at the total hip has remained stable.      Impression       Osteopenia of the femoral neck.  FRAX calculations do not suggest treatment.    RECOMMENDATIONS of [facility]:    1.  Calcium 6693-9102 mg daily and vitamin D 800-1000 units daily, adequate exercise.    2.  Consider x-ray of lumbar spine to evaluate very elevated Z-score of 7 and to rule out fracture at L2 which is more dense than the other vertebral bodies    3.  Repeat BMD in 2 years     Results for JOSÉ MIGUEL CASTILLO (MRN [id number]) as of 5/20/2019 10:59   Ref. Range 2/11/2019 16:47   TSH Latest Ref Range: 0.400 - 4.000 uIU/mL 0.940   Results for JOSÉ MIGUEL CASTILLO (MRN [id number]) as of 5/20/2019 10:59   Ref. Range 2/11/2019 16:47   PTH Latest Ref Range: 9.0 - 77.0 pg/mL 46.0   Results for JOSÉ MIGUEL CASTILLO (MRN [id number]) as of 5/20/2019 10:59   Ref. Range 2/11/2019 16:47   Vit D, 25-Hydroxy Latest Ref Range: 30 - 96 ng/mL 30   Results for JOSÉ MIGUEL CASTILLO (MRN [id number]) as of 5/20/2019 10:59   Ref. Range 4/4/2019 10:10   Magnesium Latest Ref Range: 1.6 - 2.6 mg/dL 1.6   Results for JOSÉ MIGUEL CASTILLO (MRN [id number]) as of 5/20/2019 10:59   Ref. Range 2/11/2019 16:47   Phosphorus Latest Ref Range: 2.7 - 4.5 mg/dL 4.3      3mo ago  (2/13/19) 3mo ago  (2/13/19)    Urine Volume mL 650  650    Urine Collection Duration Hr 24  24    Calcium, Urine 0.0 - 15.0 mg/dL 2.3     Calcium, 24H Urine 4 - 12 mg/Hr 1Low      CA Urine (mg/Spec) mg/Spec 15     Resulting Agency  [facility]      Pathologist Interpretation HEIDI   Pathologist Interpretation HEIDI   Collected: 02/11/19 2404   Resulting lab: [facility] [place]   Value: REVIEWED   Comment: Electronically reviewed and signed by:   Susi Tijerina MD   Signed on 02/12/19 at 14:07   No monoclonal peaks identified.      02/13/19 1058    Resulting lab: [facility] [place]   Value: REVIEWED   Comment: Electronically reviewed and signed by:   Susi Tijerina MD   Signed on 02/14/19 at 16:10   No monoclonal peaks identified.               Current Assessment & Plan     Cont alendronate 35mg po once a week         Right rotator cuff tear arthropathy    Overview     MRI right shoulder:    Massive rotator cuff tear involving full-thickness tear supraspinatus and infraspinatus with retraction level of the glenoid, superior migration of the humeral head, diffuse muscular atrophy with secondary degenerative changes glenohumeral joint as above.      Electronically signed by: Ebenezer Martinez MD  Date: 11/11/2019  Time: 14:39    CT right shoulder:    Impression       No acute abnormality.    Severe degenerative changes of the right shoulder.    Findings compatible with known rotator cuff pathology.    Additional, stable findings as above.    Electronically signed by resident: Andi Sharif  Date: 11/11/2019  Time: 13:51              Current Assessment & Plan     Diclofenac gel or Aspercreme  Ref to PT           Other Visit Diagnoses     Rotator cuff tear arthropathy of both shoulders        Relevant Orders    Ambulatory referral/consult to Physical/Occupational Therapy

## 2020-05-26 ENCOUNTER — PATIENT OUTREACH (OUTPATIENT)
Dept: OTHER | Facility: OTHER | Age: 85
End: 2020-05-26

## 2020-05-26 NOTE — PROGRESS NOTES
Digital Medicine: Clinician Follow-Up    Ms. Gerda Lai called for hypertension check-in. She has questions regarding diclofenac cream she is using for arthritic pain.     The history is provided by the patient. No  was used.     Follow Up  Patient called to share latest update about diclofenac gel that she is using for arthritic pain. She thinks that diclofenac gel is what is keeping her BP elevated. She has now switched to Aspercreme. She also reports swelling in her feet but she is using her compression socks. She feels that the diclofenac creme has contaminated her sofa, clothing and linen. She has questions regarding how to remove diclofenac cream from her clothing and linen. She feels that she is still exposing her skin to the creme if she reuses sheets or clothing or sofa area where she sat after applying the creme.       INTERVENTION(S)  encouragement/support    PLAN  patient verbalizes understanding and continue monitoring    Current 30-day BP avg of 156/66 is close to goal of <150/90.  Reviewed readings: Readings holding steady. DBP continues to be well-controlled, consistently meeting goal. SBP is intermittently controlled. Office visit on 5/25/20 recorded /69. Question patient's BP measuring technique at home and the validity of reported readings.   Discussed ADRs of diclofenac. Agreed with patient her concerns on diclofenac gel use. It has a side effect of increasing BP. ADRs of diclofenac gel, particularly hypertension, were discussed with patient back in October 2019. See note from 10/9/19.  Advised patient to wash clothes in hot water with heavy-duty detergent and stain removal like Oxi Clean. She can also try vinegar or Pine Sol in her wash to remove the oil based stain. She is having a cleaning company clean her furniture as well. Advised I did not think this was necessary but patient insists.    Continue current regimen.  Asked patient to measure her BP at any future  appointments to provide additional comparative data to home readings. Question if her technique is accurate or if rotator cuff might be causing issues.     Follow-up in 4 weeks.       There are no preventive care reminders to display for this patient.    Last 5 Patient Entered Readings                                      Current 30 Day Average: 156/66     Recent Readings 5/22/2020 5/22/2020 5/14/2020 5/14/2020 5/10/2020    SBP (mmHg) 144 144 149 149 162    DBP (mmHg) 65 65 65 65 65    Pulse 64 64 59 59 67             Hypertension Medications             aliskiren (TEKTURNA) 300 MG Tab TAKE 1 TABLET (300 MG TOTAL) BY MOUTH ONCE DAILY.    doxazosin (CARDURA) 1 MG tablet TAKE 2 TABLETS (2 MG TOTAL) BY MOUTH EVERY EVENING.    felodipine (PLENDIL) 2.5 MG Tb24 TAKE 1 TABLET BY MOUTH EVERY DAY    labetalol (NORMODYNE) 100 MG tablet Take 1 tablet (100 mg total) by mouth every 12 (twelve) hours.                 Screenings

## 2020-05-27 ENCOUNTER — OFFICE VISIT (OUTPATIENT)
Dept: OPTOMETRY | Facility: CLINIC | Age: 85
End: 2020-05-27
Payer: MEDICARE

## 2020-05-27 DIAGNOSIS — I10 HYPERTENSION, ESSENTIAL: ICD-10-CM

## 2020-05-27 DIAGNOSIS — H53.9 TRANSIENT VISION DISTURBANCE OF BOTH EYES: Primary | ICD-10-CM

## 2020-05-27 PROCEDURE — 92014 COMPRE OPH EXAM EST PT 1/>: CPT | Mod: S$PBB,,, | Performed by: OPTOMETRIST

## 2020-05-27 PROCEDURE — 99999 PR PBB SHADOW E&M-EST. PATIENT-LVL II: ICD-10-PCS | Mod: PBBFAC,,, | Performed by: OPTOMETRIST

## 2020-05-27 PROCEDURE — 92014 PR EYE EXAM, EST PATIENT,COMPREHESV: ICD-10-PCS | Mod: S$PBB,,, | Performed by: OPTOMETRIST

## 2020-05-27 PROCEDURE — 99999 PR PBB SHADOW E&M-EST. PATIENT-LVL II: CPT | Mod: PBBFAC,,, | Performed by: OPTOMETRIST

## 2020-05-27 PROCEDURE — 99212 OFFICE O/P EST SF 10 MIN: CPT | Mod: PBBFAC,PO | Performed by: OPTOMETRIST

## 2020-05-27 RX ORDER — DOXAZOSIN 1 MG/1
TABLET ORAL
Qty: 30 TABLET | Refills: 0 | Status: SHIPPED | OUTPATIENT
Start: 2020-05-27 | End: 2020-06-03 | Stop reason: SDUPTHER

## 2020-05-27 NOTE — PROGRESS NOTES
"HPI     /05/2019  Patient complaint of squiggly lines at distance while watching TV,   comes/goes.  Patient exp hot sensation lower lids ou    Last edited by Gabo Buitrago on 5/27/2020  3:29 PM. (History)        ROS     Positive for: Eyes (cat surgery OU)    Negative for: Constitutional, Gastrointestinal, Neurological, Skin,   Genitourinary, Musculoskeletal, HENT, Endocrine, Cardiovascular,   Respiratory, Psychiatric, Allergic/Imm, Heme/Lymph    Last edited by Luis Daniel Linares, OD on 5/27/2020  3:44 PM. (History)        Assessment /Plan     For exam results, see Encounter Report.    Transient vision disturbance of both eyes      See notes from last visit:  1. Mild pco OD sp pciol ou/YAG OS--pt happy w spex Rx  2. DAMION/rosacea--pt to cont w ATs  3. Pt presents  because for the past few days has had some slight crust in AM and redness.  Has NOT been using ATs as directed. Went to [facility] yesterday for another issue, and brought up her eye problems.  They Dx "bact conj" and gave her POLYTRIM which she used once but it burned so bad she didn't use again, and came to see me today.  Reassured pt there is no signs of infection today.  Her problems are the dry eye issues we have discussed in the past    Pt presents TODAY w complaints of "squiggly lines" which appear in her vision, last a few seconds, then go away.  She is NOT using ATs as directed.  She ALSO reports wasd put on a new med which threw her BP out of whack, but since she stopped the med a few days ago she has not had any lines appear.  She DOES reports episode of hazy va today, but it cleared w blink.  Suspect the squiggly lines were from BP flux, but pt reports her BP now stabilized.  She ALSO has dry eye issues and is NOT using the ATs as directed    PLAN:    1. Restart SYSTANE ATs QID  2. Pt will message me if visual disturbances continue, and I will get her a VF to ro other neurovasc causes of sx.  Otherwise if her sx resolve w normalized BP " and ATs she will rtc as sched for routine

## 2020-06-04 ENCOUNTER — CLINICAL SUPPORT (OUTPATIENT)
Dept: REHABILITATION | Facility: HOSPITAL | Age: 85
End: 2020-06-04
Attending: INTERNAL MEDICINE
Payer: MEDICARE

## 2020-06-04 DIAGNOSIS — M25.612 SHOULDER JOINT STIFFNESS, BILATERAL: ICD-10-CM

## 2020-06-04 DIAGNOSIS — M25.662 KNEE JOINT STIFFNESS, BILATERAL: ICD-10-CM

## 2020-06-04 DIAGNOSIS — M25.661 KNEE JOINT STIFFNESS, BILATERAL: ICD-10-CM

## 2020-06-04 DIAGNOSIS — M25.611 SHOULDER JOINT STIFFNESS, BILATERAL: ICD-10-CM

## 2020-06-04 DIAGNOSIS — M53.86 DECREASED ROM OF LUMBAR SPINE: ICD-10-CM

## 2020-06-04 PROCEDURE — 97161 PT EVAL LOW COMPLEX 20 MIN: CPT | Mod: PO

## 2020-06-04 PROCEDURE — 97530 THERAPEUTIC ACTIVITIES: CPT | Mod: PO

## 2020-06-04 NOTE — PROGRESS NOTES
[facility]  Physical Therapy Initial Evaluation    Date: 6/4/2020   Name: Gerda Lai  Clinic Number: [id number]    Therapy Diagnosis: No diagnosis found.  Physician: Cesar Burroughs MD    Physician Orders: PT Eval and Treat     Medical Diagnosis from Referral:   M47.812 (ICD-10-CM) - Cervical spondylosis   M75.101,M12.811,M12.812,M75.102 (ICD-10-CM) - Rotator cuff tear arthropathy of both shoulders   M17.0 (ICD-10-CM) - Primary osteoarthritis of both knees     Evaluation Date: 6/4/2020  Authorization Period Expiration: 5/25/2021  Plan of Care Expiration: 7/31/2020  Visit # / Visits authorized: 1/ 1    Time In: 4:10  Time Out: 4:45  Total Appointment Time (timed & untimed codes): 35 minutes    Precautions: Standard    Subjective   Date of onset: Pt reported that her knees are feeling the worst of her back and B shoulders  History of current condition - Gerda reports: a history of B shoulder, spine and B knee problems.  She also reported that she is concerned about her balance.     Medical History:   Past Medical History:   Diagnosis Date    Arthritis     Basal cell carcinoma 09/2016    right post auricular neck     Breast cancer 1992    right    Cataract     Fibromyalgia     History of measles as a child     Hyperlipidemia     Hypertension     Personal history of colonic polyps     Pneumonia     SCC (squamous cell carcinoma) 2015    R chest    SCC (squamous cell carcinoma) 2016    left medial shoulder    SCC (squamous cell carcinoma) 2017    left knee    Shingles     Squamous cell carcinoma 2015    right forearm    Thyroid disease     Vaginitis        Surgical History:   Gerda Lai  has a past surgical history that includes thyriodectomy; Total hip arthroplasty; tonsillectomy; Adenoidectomy; Cataract extraction w/  intraocular lens implant (Bilateral); Yag  (Left); Breast lumpectomy (Right, 1992); Breast biopsy (Left); Eye surgery; Joint replacement; and  Tonsillectomy.    Medications:   Gerda has a current medication list which includes the following prescription(s): acetaminophen, albuterol, alendronate, aliskiren, b infantis/b ani/b magali/b bifid, coenzyme q10, diclofenac sodium, doxazosin, felodipine, glucosamine-chondroitin, ipratropium, labetalol, lorazepam, multivitamin, omeprazole, polymyxin b sulf-trimethoprim, pravastatin, and turmeric.    Allergies:   Review of patient's allergies indicates:   Allergen Reactions    Sulfa (sulfonamide antibiotics) Rash    Clarithromycin Other (See Comments)     Weak, extreme fatigue. dizziness    Flexeril [cyclobenzaprine] Other (See Comments)     Dizziness    Iodine and iodide containing products     Lisinopril Other (See Comments)     Cough and sensation of throat swelling/?angioedema    Losartan Rash    Metoprolol Swelling     Tightness in throat    Tramadol Other (See Comments)     Dizzy and weak    Verapamil (bulk) Palpitations    Voltaren [diclofenac sodium] Other (See Comments)     Drops blood pressure        Imaging, X-rays:     Prior Therapy: yes  Social History: Pt lives alone  Occupation: retired  Prior Level of Function: Pt has had pain in her neck, shoulders B knees  Current Level of Function: Pt report that her knees are hurting more than her neck and shoulders.  She reported that she re-injured her L knee getting up off the floor about 3 weeks ago.    Pain:  Current 4/10, worst 8/10, best 3/10   Location: bilateral knees  Description: Aching and Sharp  Aggravating Factors: Walking  Easing Factors: rest    Pts goals: To improve her balance and decrease her B knee pain.    Objective   /81  HR 61  Observation: Pt was able to enter that clinic ambulating with a single point cane.    Posture: Flexed trunk posture.      Range of Motion:   Knee Left active Left Passive Right Active R passive   Flexion 120 nt 113 nt   Extension 3 nt 7 nt           Lower Extremity Strength  Right LE  Left LE    Knee  extension: 3/5 Knee extension: 3/5   Knee flexion: 3+/5 Knee flexion: 3+/5   Hip flexion: 3/5 Hip flexion: 3/5   Hip extension:  2/5 Hip extension: 2/5   Hip abduction: 2/5 Hip abduction: 2/5   Hip adduction: 2/5 Hip adduction 2/5   Ankle dorsiflexion: nt Ankle dorsiflexion: nt   Ankle plantarflexion: nt Ankle plantarflexion: nt       Function:    - SLS R: poor  - SLS L: poor  - Squat: nt   - Sit <--> Stand:SBA   - Bed Mobility: independent        Joint Mobility: B knees hypomobile R > L  Patellar    Palpation: tenderness R lateral thigh    Sensation: intact    Flexibility: nt    Edema: slight in B knees      TREATMENT   Treatment Time In: 4:25 pm  Treatment Time Out: 4:45 pm  Total Treatment time (time-based codes) separate from Evaluation: 20 minutes    Gerda received therapeutic exercises to develop strength, endurance and ROM for 20 minutes including:  SLR  SAQ 10 x 2  Hook lying B hip abd 10 x 2 with green TB  Hook lying B hip add 10 x 1 with a ball  Recumbent stationary bike x 10 min    Home Exercises and Patient Education Provided    Education provided:   - yes    Written Home Exercises Provided: yes.  Exercises were reviewed and Gerda was able to demonstrate them prior to the end of the session.  Gerda demonstrated good  understanding of the education provided.     See EMR under Patient Instructions for exercises provided 6/4/2020.    Assessment   Gerda is a 87 y.o. female referred to outpatient Physical Therapy with a medical diagnosis of   M47.812 (ICD-10-CM) - Cervical spondylosis   M75.101,M12.811,M12.812,M75.102 (ICD-10-CM) - Rotator cuff tear arthropathy of both shoulders   M17.0 (ICD-10-CM) - Primary osteoarthritis of both knees   . Pt presents with reported pain in Pt's neck, B knees and shoulders    Pt prognosis is Fair.   Pt will benefit from skilled outpatient Physical Therapy to address the deficits stated above and in the chart below, provide pt/family education, and to maximize pt's level  of independence.     Plan of care discussed with patient: Yes  Pt's spiritual, cultural and educational needs considered and patient is agreeable to the plan of care and goals as stated below:     Anticipated Barriers for therapy: scheduling    Medical Necessity is demonstrated by the following  History  Co-morbidities and personal factors that may impact the plan of care Co-morbidities:   advanced age, history of cancer, HTN, prior hip surgery and Fibromyalgia    Personal Factors:   age     moderate   Examination  Body Structures and Functions, activity limitations and participation restrictions that may impact the plan of care Body Regions:   neck  lower extremities  upper extremities  trunk    Body Systems:    gross symmetry  ROM  strength  balance  gait  edema    Participation Restrictions:   Prolonged walking and overhead work    Activity limitations:   Learning and applying knowledge  no deficits    General Tasks and Commands  no deficits    Communication  no deficits    Mobility  lifting and carrying objects  walking    Self care  washing oneself (bathing, drying, washing hands)  dressing    Domestic Life  shopping  doing house work (cleaning house, washing dishes, laundry)    Interactions/Relationships  no deficits    Life Areas  no deficits    Community and Social Life  recreation and leisure         moderate   Clinical Presentation stable and uncomplicated low   Decision Making/ Complexity Score: low     Goals:  Short Term Goals: 3 weeks   1. Pt will be instructed in a HEP to address her neck, B shoulder and B knees.  2. Improve R knee flexion to 118 degrees to improve functional ROM      Long Term Goals: 8 weeks   1. Pt will be independent in a HEP to assist in managing her B knee, B shoulder and neck pain  2. Improve R knee flexion to 123 degrees to improve functional ROM  3. Improve R knee extension to 3 degrees form full extension to improve functional ROM    Plan   Plan of care Certification: 6/4/2020  to 7/31/2020.    Outpatient Physical Therapy 2 times weekly for 8 weeks to include the following interventions: Manual Therapy, Moist Heat/ Ice, Neuromuscular Re-ed, Patient Education, Self Care, Therapeutic Activites, Therapeutic Exercise and Dry needling.     Ebenezer Sanchez, PT

## 2020-06-11 PROBLEM — M25.662 KNEE JOINT STIFFNESS, BILATERAL: Status: ACTIVE | Noted: 2020-06-11

## 2020-06-11 PROBLEM — M53.86 DECREASED ROM OF LUMBAR SPINE: Status: ACTIVE | Noted: 2020-06-11

## 2020-06-11 PROBLEM — M25.661 KNEE JOINT STIFFNESS, BILATERAL: Status: ACTIVE | Noted: 2020-06-11

## 2020-06-11 PROBLEM — M25.611 SHOULDER JOINT STIFFNESS, BILATERAL: Status: ACTIVE | Noted: 2020-06-11

## 2020-06-11 PROBLEM — M25.612 SHOULDER JOINT STIFFNESS, BILATERAL: Status: ACTIVE | Noted: 2020-06-11

## 2020-06-11 NOTE — PROGRESS NOTES
Physical Therapy Daily Treatment Note     Name: Gerda Lai  Clinic Number: [id number]    Therapy Diagnosis:   Encounter Diagnoses   Name Primary?    Shoulder joint stiffness, bilateral     Decreased ROM of lumbar spine     Knee joint stiffness, bilateral      Physician: Cesar Burroughs MD    Visit Date: 6/12/2020    Physician Orders: PT Eval and Treat      Medical Diagnosis from Referral:   M47.812 (ICD-10-CM) - Cervical spondylosis   M75.101,M12.811,M12.812,M75.102 (ICD-10-CM) - Rotator cuff tear arthropathy of both shoulders   M17.0 (ICD-10-CM) - Primary osteoarthritis of both knees      Evaluation Date: 6/4/2020  Authorization Period Expiration: 5/25/2021  Plan of Care Expiration: 7/31/2020  Visit # / Visits authorized: 1/ 1 (2 total)      Time In: 2:30 pm   Time Out: 3:15 pm  Total Billable Time: 45 minutes      Precautions: Standard    Subjective     Pt reports: she's hanging in there . Pt stated she thinks getting up and moving more will help her knees, she sits a lot during the days and knows she doesn't do enough .   She was compliant with home exercise program.  Response to previous treatment: good- a little tired after  Functional change: none    Pain: 6/10  Location: bilateral knees      Objective     BP measurements taken prior to session: 143/64    Gerda received therapeutic exercises to develop strength, endurance and ROM for 45 minutes including:    Quad sets : 2 x 10 , 5'' hold   SLR- 2 x 7 B   SAQ 10 x 2 B  Hook lying B hip abd 10 x 2 with green TB  Hook lying B hip add 10 x 1 with a ball  Recumbent stationary bike x 7 min    BP taken following session: 136/61  Home Exercises Provided and Patient Education Provided     Education provided:   - Continue previous HEP    Written Home Exercises Provided: Patient instructed to cont prior HEP.  Exercises were reviewed and Gerda was able to demonstrate them prior to the end of the session.  Gerda demonstrated good  understanding of the education  provided.     See EMR under Patient Instructions for exercises provided 6/12/2020.      Assessment     Pt with a fairly good tolerance to session today . She exhibited decreased endurance and with extra time required to complete exercises above. She reported slightly decreased pain following session. Will continue to progress as tolerated.     Gerda is progressing well towards her goals.   Pt prognosis is Fair.     Pt will continue to benefit from skilled outpatient physical therapy to address the deficits listed in the problem list box on initial evaluation, provide pt/family education and to maximize pt's level of independence in the home and community environment.   Pt's spiritual, cultural and educational needs considered and pt agreeable to plan of care and goals.    Anticipated barriers to physical therapy: scheduling    Goals:  Short Term Goals: 3 weeks   1. Pt will be instructed in a HEP to address her neck, B shoulder and B knees.  2. Improve R knee flexion to 118 degrees to improve functional ROM        Long Term Goals: 8 weeks   1. Pt will be independent in a HEP to assist in managing her B knee, B shoulder and neck pain  2. Improve R knee flexion to 123 degrees to improve functional ROM  3. Improve R knee extension to 3 degrees form full extension to improve functional ROM    Plan     Continue to progress B LE strengthening as tolerated.     Alley Mcdermott, PTA

## 2020-06-12 ENCOUNTER — CLINICAL SUPPORT (OUTPATIENT)
Dept: REHABILITATION | Facility: HOSPITAL | Age: 85
End: 2020-06-12
Attending: INTERNAL MEDICINE
Payer: MEDICARE

## 2020-06-12 DIAGNOSIS — M25.662 KNEE JOINT STIFFNESS, BILATERAL: ICD-10-CM

## 2020-06-12 DIAGNOSIS — M25.611 SHOULDER JOINT STIFFNESS, BILATERAL: ICD-10-CM

## 2020-06-12 DIAGNOSIS — M53.86 DECREASED ROM OF LUMBAR SPINE: ICD-10-CM

## 2020-06-12 DIAGNOSIS — M25.612 SHOULDER JOINT STIFFNESS, BILATERAL: ICD-10-CM

## 2020-06-12 DIAGNOSIS — M25.661 KNEE JOINT STIFFNESS, BILATERAL: ICD-10-CM

## 2020-06-12 PROCEDURE — 97110 THERAPEUTIC EXERCISES: CPT | Mod: PO,CQ

## 2020-06-12 NOTE — PLAN OF CARE
[facility]  Physical Therapy Initial Evaluation    Date: 6/4/2020   Name: Gerda Lai  Clinic Number: [id number]    Therapy Diagnosis: No diagnosis found.  Physician: Cesar Burroughs MD    Physician Orders: PT Eval and Treat     Medical Diagnosis from Referral:   M47.812 (ICD-10-CM) - Cervical spondylosis   M75.101,M12.811,M12.812,M75.102 (ICD-10-CM) - Rotator cuff tear arthropathy of both shoulders   M17.0 (ICD-10-CM) - Primary osteoarthritis of both knees     Evaluation Date: 6/4/2020  Authorization Period Expiration: 5/25/2021  Plan of Care Expiration: 7/31/2020  Visit # / Visits authorized: 1/ 1    Time In: 4:10  Time Out: 4:45  Total Appointment Time (timed & untimed codes): 35 minutes    Precautions: Standard    Subjective   Date of onset: Pt reported that her knees are feeling the worst of her back and B shoulders  History of current condition - Gerda reports: a history of B shoulder, spine and B knee problems.  She also reported that she is concerned about her balance.     Medical History:   Past Medical History:   Diagnosis Date    Arthritis     Basal cell carcinoma 09/2016    right post auricular neck     Breast cancer 1992    right    Cataract     Fibromyalgia     History of measles as a child     Hyperlipidemia     Hypertension     Personal history of colonic polyps     Pneumonia     SCC (squamous cell carcinoma) 2015    R chest    SCC (squamous cell carcinoma) 2016    left medial shoulder    SCC (squamous cell carcinoma) 2017    left knee    Shingles     Squamous cell carcinoma 2015    right forearm    Thyroid disease     Vaginitis        Surgical History:   Gerda Lai  has a past surgical history that includes thyriodectomy; Total hip arthroplasty; tonsillectomy; Adenoidectomy; Cataract extraction w/  intraocular lens implant (Bilateral); Yag  (Left); Breast lumpectomy (Right, 1992); Breast biopsy (Left); Eye surgery; Joint replacement; and  Tonsillectomy.    Medications:   Gerda has a current medication list which includes the following prescription(s): acetaminophen, albuterol, alendronate, aliskiren, b infantis/b ani/b magali/b bifid, coenzyme q10, diclofenac sodium, doxazosin, felodipine, glucosamine-chondroitin, ipratropium, labetalol, lorazepam, multivitamin, omeprazole, polymyxin b sulf-trimethoprim, pravastatin, and turmeric.    Allergies:   Review of patient's allergies indicates:   Allergen Reactions    Sulfa (sulfonamide antibiotics) Rash    Clarithromycin Other (See Comments)     Weak, extreme fatigue. dizziness    Flexeril [cyclobenzaprine] Other (See Comments)     Dizziness    Iodine and iodide containing products     Lisinopril Other (See Comments)     Cough and sensation of throat swelling/?angioedema    Losartan Rash    Metoprolol Swelling     Tightness in throat    Tramadol Other (See Comments)     Dizzy and weak    Verapamil (bulk) Palpitations    Voltaren [diclofenac sodium] Other (See Comments)     Drops blood pressure        Imaging, X-rays:     Prior Therapy: yes  Social History: Pt lives alone  Occupation: retired  Prior Level of Function: Pt has had pain in her neck, shoulders B knees  Current Level of Function: Pt report that her knees are hurting more than her neck and shoulders.  She reported that she re-injured her L knee getting up off the floor about 3 weeks ago.    Pain:  Current 4/10, worst 8/10, best 3/10   Location: bilateral knees  Description: Aching and Sharp  Aggravating Factors: Walking  Easing Factors: rest    Pts goals: To improve her balance and decrease her B knee pain.    Objective   /81  HR 61  Observation: Pt was able to enter that clinic ambulating with a single point cane.    Posture: Flexed trunk posture.      Range of Motion:   Knee Left active Left Passive Right Active R passive   Flexion 120 nt 113 nt   Extension 3 nt 7 nt           Lower Extremity Strength  Right LE  Left LE    Knee  extension: 3/5 Knee extension: 3/5   Knee flexion: 3+/5 Knee flexion: 3+/5   Hip flexion: 3/5 Hip flexion: 3/5   Hip extension:  2/5 Hip extension: 2/5   Hip abduction: 2/5 Hip abduction: 2/5   Hip adduction: 2/5 Hip adduction 2/5   Ankle dorsiflexion: nt Ankle dorsiflexion: nt   Ankle plantarflexion: nt Ankle plantarflexion: nt       Function:    - SLS R: poor  - SLS L: poor  - Squat: nt   - Sit <--> Stand:SBA   - Bed Mobility: independent        Joint Mobility: B knees hypomobile R > L  Patellar    Palpation: tenderness R lateral thigh    Sensation: intact    Flexibility: nt    Edema: slight in B knees      TREATMENT   Treatment Time In: 4:25 pm  Treatment Time Out: 4:45 pm  Total Treatment time (time-based codes) separate from Evaluation: 20 minutes    Gerda received therapeutic exercises to develop strength, endurance and ROM for 20 minutes including:  SLR  SAQ 10 x 2  Hook lying B hip abd 10 x 2 with green TB  Hook lying B hip add 10 x 1 with a ball  Recumbent stationary bike x 10 min    Home Exercises and Patient Education Provided    Education provided:   - yes    Written Home Exercises Provided: yes.  Exercises were reviewed and Gerda was able to demonstrate them prior to the end of the session.  Gerda demonstrated good  understanding of the education provided.     See EMR under Patient Instructions for exercises provided 6/4/2020.    Assessment   Gerda is a 87 y.o. female referred to outpatient Physical Therapy with a medical diagnosis of   M47.812 (ICD-10-CM) - Cervical spondylosis   M75.101,M12.811,M12.812,M75.102 (ICD-10-CM) - Rotator cuff tear arthropathy of both shoulders   M17.0 (ICD-10-CM) - Primary osteoarthritis of both knees   . Pt presents with reported pain in Pt's neck, B knees and shoulders    Pt prognosis is Fair.   Pt will benefit from skilled outpatient Physical Therapy to address the deficits stated above and in the chart below, provide pt/family education, and to maximize pt's level  of independence.     Plan of care discussed with patient: Yes  Pt's spiritual, cultural and educational needs considered and patient is agreeable to the plan of care and goals as stated below:     Anticipated Barriers for therapy: scheduling    Medical Necessity is demonstrated by the following  History  Co-morbidities and personal factors that may impact the plan of care Co-morbidities:   advanced age, history of cancer, HTN, prior hip surgery and Fibromyalgia    Personal Factors:   age     moderate   Examination  Body Structures and Functions, activity limitations and participation restrictions that may impact the plan of care Body Regions:   neck  lower extremities  upper extremities  trunk    Body Systems:    gross symmetry  ROM  strength  balance  gait  edema    Participation Restrictions:   Prolonged walking and overhead work    Activity limitations:   Learning and applying knowledge  no deficits    General Tasks and Commands  no deficits    Communication  no deficits    Mobility  lifting and carrying objects  walking    Self care  washing oneself (bathing, drying, washing hands)  dressing    Domestic Life  shopping  doing house work (cleaning house, washing dishes, laundry)    Interactions/Relationships  no deficits    Life Areas  no deficits    Community and Social Life  recreation and leisure         moderate   Clinical Presentation stable and uncomplicated low   Decision Making/ Complexity Score: low     Goals:  Short Term Goals: 3 weeks   1. Pt will be instructed in a HEP to address her neck, B shoulder and B knees.  2. Improve R knee flexion to 118 degrees to improve functional ROM      Long Term Goals: 8 weeks   1. Pt will be independent in a HEP to assist in managing her B knee, B shoulder and neck pain  2. Improve R knee flexion to 123 degrees to improve functional ROM  3. Improve R knee extension to 3 degrees form full extension to improve functional ROM    Plan   Plan of care Certification: 6/4/2020  to 7/31/2020.    Outpatient Physical Therapy 2 times weekly for 8 weeks to include the following interventions: Manual Therapy, Moist Heat/ Ice, Neuromuscular Re-ed, Patient Education, Self Care, Therapeutic Activites, Therapeutic Exercise and Dry needling.     Ebenezer Sanchez, PT

## 2020-06-15 ENCOUNTER — PATIENT OUTREACH (OUTPATIENT)
Dept: OTHER | Facility: OTHER | Age: 85
End: 2020-06-15

## 2020-06-15 ENCOUNTER — CLINICAL SUPPORT (OUTPATIENT)
Dept: REHABILITATION | Facility: HOSPITAL | Age: 85
End: 2020-06-15
Attending: INTERNAL MEDICINE
Payer: MEDICARE

## 2020-06-15 DIAGNOSIS — M25.612 SHOULDER JOINT STIFFNESS, BILATERAL: ICD-10-CM

## 2020-06-15 DIAGNOSIS — M25.611 SHOULDER JOINT STIFFNESS, BILATERAL: ICD-10-CM

## 2020-06-15 DIAGNOSIS — M53.86 DECREASED ROM OF LUMBAR SPINE: ICD-10-CM

## 2020-06-15 DIAGNOSIS — M25.661 KNEE JOINT STIFFNESS, BILATERAL: ICD-10-CM

## 2020-06-15 DIAGNOSIS — I10 HYPERTENSION, ESSENTIAL: ICD-10-CM

## 2020-06-15 DIAGNOSIS — M25.662 KNEE JOINT STIFFNESS, BILATERAL: ICD-10-CM

## 2020-06-15 PROCEDURE — 97110 THERAPEUTIC EXERCISES: CPT | Mod: PO,CQ

## 2020-06-15 RX ORDER — FELODIPINE 2.5 MG/1
5 TABLET, EXTENDED RELEASE ORAL DAILY
Qty: 90 TABLET | Refills: 1 | Status: SHIPPED | OUTPATIENT
Start: 2020-06-15 | End: 2020-06-16

## 2020-06-15 NOTE — PROGRESS NOTES
Physical Therapy Daily Treatment Note     Name: Gerda Lai  Clinic Number: [id number]    Therapy Diagnosis:   No diagnosis found.  Physician: Cesar Burroughs MD    Visit Date: 6/15/2020    Physician Orders: PT Eval and Treat      Medical Diagnosis from Referral:   M47.812 (ICD-10-CM) - Cervical spondylosis   M75.101,M12.811,M12.812,M75.102 (ICD-10-CM) - Rotator cuff tear arthropathy of both shoulders   M17.0 (ICD-10-CM) - Primary osteoarthritis of both knees      Evaluation Date: 6/4/2020  Authorization Period Expiration: 5/25/2021  Plan of Care Expiration: 7/31/2020  Visit # / Visits authorized: 1/ 1 (3 total)      Time In:  4:03 pm   Time Out: 4:45 pm   Total Billable Time:  42 minutes      Precautions: Standard    Subjective     Pt reports:  Its been several weeks since she twisted her knee and it still hurts . She feels like something needs to be done to it. Pt stated the exercises have helped and he noticed that her knees get worse if she skips her exercises .   She was compliant with home exercise program.  Response to previous treatment: good- a little tired after  Functional change: none    Pain: 6/10  Location: bilateral knees      Objective     BP measurements taken prior to session: 124/60    Gerda received therapeutic exercises to develop strength, endurance and ROM for 45 minutes including:    Quad sets : 2 x 10 , 5'' hold   SLR- 2 x 7 B   SAQ 10 x 2 B  Hook lying B hip abd 10 x 2 with green TB  Hook lying B hip add 10 x 2 with a ball  Recumbent stationary bike x 7 min    BP taken following session: 142/60    Home Exercises Provided and Patient Education Provided     Education provided:   - Continue previous HEP    Written Home Exercises Provided: Patient instructed to cont prior HEP.  Exercises were reviewed and Gerda was able to demonstrate them prior to the end of the session.  Gerda demonstrated good  understanding of the education provided.     See EMR under Patient Instructions for  exercises provided 6/12/2020.    Assessment     Pt presents with no improvements or worsening pain reported in B knees. She continues to exhibit weakness in B hips and quadriceps . Will continue to progress LE strengthening as tolerated.     Gerda is progressing well towards her goals.   Pt prognosis is Fair.     Pt will continue to benefit from skilled outpatient physical therapy to address the deficits listed in the problem list box on initial evaluation, provide pt/family education and to maximize pt's level of independence in the home and community environment.   Pt's spiritual, cultural and educational needs considered and pt agreeable to plan of care and goals.    Anticipated barriers to physical therapy: scheduling    Goals:  Short Term Goals: 3 weeks   1. Pt will be instructed in a HEP to address her neck, B shoulder and B knees.  2. Improve R knee flexion to 118 degrees to improve functional ROM        Long Term Goals: 8 weeks   1. Pt will be independent in a HEP to assist in managing her B knee, B shoulder and neck pain  2. Improve R knee flexion to 123 degrees to improve functional ROM  3. Improve R knee extension to 3 degrees form full extension to improve functional ROM    Plan     Continue to progress B LE strengthening as tolerated.     Alley Mcdermott, PTA

## 2020-06-15 NOTE — PROGRESS NOTES
Digital Medicine: Clinician Follow-Up    Ms. Gerda Lai called concerned about not being able to refill her felodipine prescription at her local pharmacy.     The history is provided by the patient. No  was used.   Follow Up  Follow-up reason(s): reading review and routine education    Ms. Lorenz reports not being able to fill her felodipine which she is taking 2.5 mg twice daily. She has been taking it this way since our last discussion in March 2020 but prescription on file at local pharmacy reflects 2.5 mg once daily. She feels that she has been overdosing on using diclofenac and attributes that to her previously elevated BP readings; therefore, she has stopped its use and replaced with Aspercreme patch and gel for pain treatment in back and knee. She reports receiving a letter from FDA regarding diclofenac use. Letter is requesting she complete a questionnaire and return. She is uncertain if she should complete. Patient confirmed that she reached out to 800# on diclofenac box and discussed overuse with someone at their call center. She has questions regarding care for her knee which she twisted about 3 weeks ago trying to retrieve a tablet she dropped from under a table. She is still having some pain with that knee. Lastly, she also shares a concern about needing to find a new PCP as PCP Area is reducing his patient load in preparation for jail. She is interested in placing her care with Tomas Beltran MD as her new PCP. She saw her last year when Dr. Sellers was out and enjoyed her experience with him. She has no concerns about her BP readings, no complaints about ADRs and is very happy with her current regimen and results at this time. She thanked me repeatedly for always taking time to hear her concerns and helping her resolve them.       INTERVENTION(S)  encouragement/support    PLAN  patient verbalizes understanding, await MD intervention and continue monitoring    Current 30-day BP avg  of 154/67 is near goal of <150/90.  Reminded patient of concerns with diclofenac use as excessive use will increase BP.  Discussed aspercreme use. Patch suggests use for 1 week. Recommended alternating between aspercreme and Biofreeze for pain treatment.   Patient has appointment today with Ortho. Advised having Ortho look at her knee today while there as they have been treating her for knee and back pain over the past few months.     Continue current regimen.  Update felodipine Rx to prevent refill issues.   Sent message to Tomas Beltran MD concerning establishment of care as PCP.      Follow-up in 4 weeks.       There are no preventive care reminders to display for this patient.    Last 5 Patient Entered Readings                                      Current 30 Day Average: 154/67     Recent Readings 6/12/2020 6/12/2020 6/10/2020 6/10/2020 6/3/2020    SBP (mmHg) 158 158 153 153 175    DBP (mmHg) 62 62 62 62 80    Pulse 59 59 60 60 65          Hypertension Medications             aliskiren (TEKTURNA) 300 MG Tab TAKE 1 TABLET (300 MG TOTAL) BY MOUTH ONCE DAILY.    doxazosin (CARDURA) 1 MG tablet TAKE 2 TABLETS (2 MG TOTAL) BY MOUTH EVERY EVENING.    felodipine (PLENDIL) 2.5 MG Tb24 Take 2 tablets (5 mg total) by mouth once daily. - Take one tablet (2.5 mg) in the morning and one tablet (2.5 mg) in the evening to reduce symptoms of hypotension.    labetalol (NORMODYNE) 100 MG tablet Take 1 tablet (100 mg total) by mouth every 12 (twelve) hours.               Screenings

## 2020-06-16 ENCOUNTER — PES CALL (OUTPATIENT)
Dept: ADMINISTRATIVE | Facility: CLINIC | Age: 85
End: 2020-06-16

## 2020-06-18 ENCOUNTER — CLINICAL SUPPORT (OUTPATIENT)
Dept: REHABILITATION | Facility: HOSPITAL | Age: 85
End: 2020-06-18
Attending: INTERNAL MEDICINE
Payer: MEDICARE

## 2020-06-18 DIAGNOSIS — M25.612 SHOULDER JOINT STIFFNESS, BILATERAL: ICD-10-CM

## 2020-06-18 DIAGNOSIS — M25.611 SHOULDER JOINT STIFFNESS, BILATERAL: ICD-10-CM

## 2020-06-18 DIAGNOSIS — M25.661 KNEE JOINT STIFFNESS, BILATERAL: ICD-10-CM

## 2020-06-18 DIAGNOSIS — M53.86 DECREASED ROM OF LUMBAR SPINE: ICD-10-CM

## 2020-06-18 DIAGNOSIS — M25.662 KNEE JOINT STIFFNESS, BILATERAL: ICD-10-CM

## 2020-06-18 PROCEDURE — 97110 THERAPEUTIC EXERCISES: CPT | Mod: PO,CQ

## 2020-06-18 NOTE — PROGRESS NOTES
Physical Therapy Daily Treatment Note     Name: Gerda Lai  Clinic Number: [id number]    Therapy Diagnosis:   Encounter Diagnoses   Name Primary?    Shoulder joint stiffness, bilateral     Decreased ROM of lumbar spine     Knee joint stiffness, bilateral      Physician: Cesar Burroughs MD    Visit Date: 6/18/2020    Physician Orders: PT Eval and Treat      Medical Diagnosis from Referral:   M47.812 (ICD-10-CM) - Cervical spondylosis   M75.101,M12.811,M12.812,M75.102 (ICD-10-CM) - Rotator cuff tear arthropathy of both shoulders   M17.0 (ICD-10-CM) - Primary osteoarthritis of both knees      Evaluation Date: 6/4/2020  Authorization Period Expiration: 5/25/2021  Plan of Care Expiration: 7/31/2020  Visit # / Visits authorized: 1/ 1 (4 total)      Time In:  4:12 pm (pt late arrival)   Time Out: 4:45 pm   Total Billable Time:   33     Precautions: Standard    Subjective     Pt reports: she is sorry that she is late, she fell asleep on the couch. Pt stated her knees were hurting after her last session so she went home and iced them which helped.   She was compliant with home exercise program.  Response to previous treatment: increased knee pain  Functional change: none    Pain: 4/10  Location: bilateral knees      Objective     BP measurements taken prior to session: 148/60    Gerda received therapeutic exercises to develop strength, endurance and ROM for 33 minutes including:    Quad sets : 2 x 10 , 5'' hold   SLR- 2 x 7 B   SAQ 10 x 2 B - NP  Hook lying B hip abd 10 x 2 with green TB  Hook lying B hip add 10 x 2 with a ball  Recumbent stationary bike x 6 min    BP taken following session: 126/62    Home Exercises Provided and Patient Education Provided     Education provided:   - Continue previous HEP    Written Home Exercises Provided: Patient instructed to cont prior HEP.  Exercises were reviewed and Gerda was able to demonstrate them prior to the end of the session.  Gerda demonstrated good  understanding  of the education provided.     See EMR under Patient Instructions for exercises provided 6/12/2020.    Assessment     Pt presents with increased pain reported after last session . Pt performed only CKC activities today and denied experiencing any pain with exercises performed.      Gerda is progressing well towards her goals.   Pt prognosis is Fair.     Pt will continue to benefit from skilled outpatient physical therapy to address the deficits listed in the problem list box on initial evaluation, provide pt/family education and to maximize pt's level of independence in the home and community environment.   Pt's spiritual, cultural and educational needs considered and pt agreeable to plan of care and goals.    Anticipated barriers to physical therapy: scheduling    Goals:  Short Term Goals: 3 weeks   1. Pt will be instructed in a HEP to address her neck, B shoulder and B knees.  2. Improve R knee flexion to 118 degrees to improve functional ROM        Long Term Goals: 8 weeks   1. Pt will be independent in a HEP to assist in managing her B knee, B shoulder and neck pain  2. Improve R knee flexion to 123 degrees to improve functional ROM  3. Improve R knee extension to 3 degrees form full extension to improve functional ROM    Plan     Continue to progress B LE strengthening as tolerated.     Alley Mcdermott, PTA

## 2020-06-22 ENCOUNTER — OFFICE VISIT (OUTPATIENT)
Dept: ORTHOPEDICS | Facility: CLINIC | Age: 85
End: 2020-06-22
Payer: MEDICARE

## 2020-06-22 VITALS
TEMPERATURE: 97 F | WEIGHT: 141.13 LBS | DIASTOLIC BLOOD PRESSURE: 61 MMHG | HEART RATE: 56 BPM | SYSTOLIC BLOOD PRESSURE: 156 MMHG | BODY MASS INDEX: 24.52 KG/M2

## 2020-06-22 DIAGNOSIS — M17.12 PRIMARY OSTEOARTHRITIS OF LEFT KNEE: Primary | ICD-10-CM

## 2020-06-22 PROCEDURE — 99214 OFFICE O/P EST MOD 30 MIN: CPT | Mod: PBBFAC | Performed by: PHYSICIAN ASSISTANT

## 2020-06-22 PROCEDURE — 99999 PR PBB SHADOW E&M-EST. PATIENT-LVL IV: ICD-10-PCS | Mod: PBBFAC,,, | Performed by: PHYSICIAN ASSISTANT

## 2020-06-22 PROCEDURE — 99213 OFFICE O/P EST LOW 20 MIN: CPT | Mod: S$PBB,,, | Performed by: PHYSICIAN ASSISTANT

## 2020-06-22 PROCEDURE — 99999 PR PBB SHADOW E&M-EST. PATIENT-LVL IV: CPT | Mod: PBBFAC,,, | Performed by: PHYSICIAN ASSISTANT

## 2020-06-22 PROCEDURE — 99213 PR OFFICE/OUTPT VISIT, EST, LEVL III, 20-29 MIN: ICD-10-PCS | Mod: S$PBB,,, | Performed by: PHYSICIAN ASSISTANT

## 2020-06-22 NOTE — PROGRESS NOTES
Subjective:      Patient ID: Gerda Lai is a 87 y.o. female.    Chief Complaint: Pain of the Left Knee    HPI  87 year old female presents with chief complaint of left knee pain. She has h/o OA. She twisted the knee about 3-4 weeks ago. She reports swelling. She had to d/c the voltaren gel because it was causing flushing. She had cortisone injections in the past but they have become less effective. Last injection was in March. She had viscosupplementation in the past without much relief. She uses a cane prn. She is in PT. She says her knee pain is starting to affect her daily activities and quality of life.   Review of Systems   Constitution: Negative for chills, fever and night sweats.   Cardiovascular: Negative for chest pain.   Respiratory: Negative for cough and shortness of breath.    Hematologic/Lymphatic: Does not bruise/bleed easily.   Skin: Negative for color change.   Gastrointestinal: Negative for heartburn.   Genitourinary: Negative for dysuria.   Neurological: Negative for numbness and paresthesias.   Psychiatric/Behavioral: Negative for altered mental status.   Allergic/Immunologic: Negative for persistent infections.         Objective:            General    Vitals reviewed.  Constitutional: She is oriented to person, place, and time. She appears well-developed and well-nourished.   Cardiovascular: Normal rate.    Neurological: She is alert and oriented to person, place, and time.             Left Knee Exam:  ROM 5-120 degrees  Small effusion  No warmth or erythema  TTP medial joint line      X-ray: reviewed by myself. DJD with significant narrowing of the lateral tibiofemoral joint spaces bilaterally.  Narrowing of the patellofemoral joint spaces also noted.  No acute fracture or dislocation.  No bone destruction identified.      Assessment:       Encounter Diagnosis   Name Primary?    Primary osteoarthritis of left knee Yes          Plan:       Discussed treatment options with patient. She  would like to discuss possible TKA with a surgeon. Consult was scheduled.

## 2020-06-24 ENCOUNTER — CLINICAL SUPPORT (OUTPATIENT)
Dept: REHABILITATION | Facility: HOSPITAL | Age: 85
End: 2020-06-24
Attending: INTERNAL MEDICINE
Payer: MEDICARE

## 2020-06-24 DIAGNOSIS — M25.611 SHOULDER JOINT STIFFNESS, BILATERAL: ICD-10-CM

## 2020-06-24 DIAGNOSIS — M25.612 SHOULDER JOINT STIFFNESS, BILATERAL: ICD-10-CM

## 2020-06-24 DIAGNOSIS — M25.662 KNEE JOINT STIFFNESS, BILATERAL: ICD-10-CM

## 2020-06-24 DIAGNOSIS — M25.661 KNEE JOINT STIFFNESS, BILATERAL: ICD-10-CM

## 2020-06-24 DIAGNOSIS — M53.86 DECREASED ROM OF LUMBAR SPINE: ICD-10-CM

## 2020-06-24 PROCEDURE — 97110 THERAPEUTIC EXERCISES: CPT | Mod: PO,CQ

## 2020-06-24 NOTE — PROGRESS NOTES
Physical Therapy Daily Treatment Note     Name: Gerda Lai  Clinic Number: [id number]    Therapy Diagnosis:   Encounter Diagnoses   Name Primary?    Shoulder joint stiffness, bilateral     Decreased ROM of lumbar spine     Knee joint stiffness, bilateral      Physician: Cesar Burroughs MD    Visit Date: 6/24/2020    Physician Orders: PT Eval and Treat      Medical Diagnosis from Referral:   M47.812 (ICD-10-CM) - Cervical spondylosis   M75.101,M12.811,M12.812,M75.102 (ICD-10-CM) - Rotator cuff tear arthropathy of both shoulders   M17.0 (ICD-10-CM) - Primary osteoarthritis of both knees      Evaluation Date: 6/4/2020  Authorization Period Expiration: 5/25/2021  Plan of Care Expiration: 7/31/2020  Visit # / Visits authorized:  (6 total)      Time In: 04:25 (patient with late arrival)   Time Out: 05:05 pm   Total Billable Time:  Minutes      Precautions: Standard    Subjective     Pt reports: She's feeling ok today, does home exercise program daily.    She was compliant with home exercise program.  Response to previous treatment: increased knee pain  Functional change: none    Pain: 2-3 /10  Location: bilateral knees      Objective     BP measurements taken prior to session: 148/60    Gerda received therapeutic exercises to develop strength, endurance and ROM for 40 minutes including:    Quad sets : 2 x 10 , 5'' hold   SLR- 2 x 7 B   SAQ 10 x 2 B # 2lb weight   Hook lying B hip abd 10 x 2 with green TB  Hook lying B hip add 10 x 2 with a ball  Double leg shuttle: 2 x 10 #1.5 bands   Recumbent stationary bike x 6 minutes    BP taken following session: 126/62    Home Exercises Provided and Patient Education Provided     Education provided:   - Continue previous HEP    Written Home Exercises Provided: Patient instructed to cont prior HEP.  Exercises were reviewed and Gerda was able to demonstrate them prior to the end of the session.  Gerda demonstrated good  understanding of the education provided.     See EMR  under Patient Instructions for exercises provided 6/12/2020.    Assessment     Pt presents with increased pain reported after last session . Pt performed only CKC activities today and denied experiencing any pain with exercises performed.      Gerda is progressing well towards her goals.   Pt prognosis is Fair.     Pt will continue to benefit from skilled outpatient physical therapy to address the deficits listed in the problem list box on initial evaluation, provide pt/family education and to maximize pt's level of independence in the home and community environment.   Pt's spiritual, cultural and educational needs considered and pt agreeable to plan of care and goals.    Anticipated barriers to physical therapy: scheduling    Goals:  Short Term Goals: 3 weeks   1. Pt will be instructed in a HEP to address her neck, B shoulder and B knees.  2. Improve R knee flexion to 118 degrees to improve functional ROM        Long Term Goals: 8 weeks   1. Pt will be independent in a HEP to assist in managing her B knee, B shoulder and neck pain  2. Improve R knee flexion to 123 degrees to improve functional ROM  3. Improve R knee extension to 3 degrees form full extension to improve functional ROM    Plan     Continue to progress B LE strengthening as tolerated.     Evelyn Grossman, PTA

## 2020-06-25 ENCOUNTER — PES CALL (OUTPATIENT)
Dept: ADMINISTRATIVE | Facility: CLINIC | Age: 85
End: 2020-06-25

## 2020-06-26 ENCOUNTER — CLINICAL SUPPORT (OUTPATIENT)
Dept: REHABILITATION | Facility: HOSPITAL | Age: 85
End: 2020-06-26
Attending: INTERNAL MEDICINE
Payer: MEDICARE

## 2020-06-26 DIAGNOSIS — M25.662 KNEE JOINT STIFFNESS, BILATERAL: ICD-10-CM

## 2020-06-26 DIAGNOSIS — M53.86 DECREASED ROM OF LUMBAR SPINE: ICD-10-CM

## 2020-06-26 DIAGNOSIS — M25.661 KNEE JOINT STIFFNESS, BILATERAL: ICD-10-CM

## 2020-06-26 DIAGNOSIS — M25.612 SHOULDER JOINT STIFFNESS, BILATERAL: ICD-10-CM

## 2020-06-26 DIAGNOSIS — M25.611 SHOULDER JOINT STIFFNESS, BILATERAL: ICD-10-CM

## 2020-06-26 PROCEDURE — 97110 THERAPEUTIC EXERCISES: CPT | Mod: PO,CQ

## 2020-06-26 NOTE — PROGRESS NOTES
Physical Therapy Daily Treatment Note     Name: Gerda Lai  Clinic Number: [id number]    Therapy Diagnosis:   Encounter Diagnoses   Name Primary?    Shoulder joint stiffness, bilateral     Decreased ROM of lumbar spine     Knee joint stiffness, bilateral      Physician: Cesar Burroughs MD    Visit Date: 6/26/2020    Physician Orders: PT Eval and Treat      Medical Diagnosis from Referral:   M47.812 (ICD-10-CM) - Cervical spondylosis   M75.101,M12.811,M12.812,M75.102 (ICD-10-CM) - Rotator cuff tear arthropathy of both shoulders   M17.0 (ICD-10-CM) - Primary osteoarthritis of both knees      Evaluation Date: 6/4/2020  Authorization Period Expiration: 12/31/2020  Plan of Care Expiration: 7/31/2020  Visit # / Visits authorized: (6 total  )      Time In: 2:35 pm  Time Out: 3:20 pm   Total Billable Time:   45 minutes      Precautions: Standard    Subjective     Pt reports:  she feels like she is walking better.   She was compliant with home exercise program.  Response to previous treatment: increased knee pain  Functional change: none    Pain: 2-3 /10  Location: bilateral knees      Objective     BP measurements taken prior to session: 154/60    Gerda received therapeutic exercises to develop strength, endurance and ROM for 45 minutes including:    Quad sets : 2 x 10 , 5'' hold   SLR- 2 x 7 B   SAQ 10 x 2 B # 2lb weight   Hook lying B hip abd 10 x 2 with green TB  Hook lying B hip add 10 x 2 with a ball  Bridges : 2 x 10 reps   Double leg shuttle: 2 x 10 #1.5 bands   Recumbent stationary bike x 6 minutes    BP taken following session: 144/54    Home Exercises Provided and Patient Education Provided     Education provided:   - Continue previous HEP    Written Home Exercises Provided: Patient instructed to cont prior HEP.  Exercises were reviewed and Gerda was able to demonstrate them prior to the end of the session.  Gerda demonstrated good  understanding of the education provided.     See EMR under Patient  Instructions for exercises provided 6/12/2020.    Assessment      Pt had a better tolerance to session today with less pain overall and improvements in mobility noted. She required less frequent rest breaks and demonstrated slightly improved endurance as well. Will continue to progress strengthening as tolerated.     Gerda is progressing well towards her goals.   Pt prognosis is Fair.     Pt will continue to benefit from skilled outpatient physical therapy to address the deficits listed in the problem list box on initial evaluation, provide pt/family education and to maximize pt's level of independence in the home and community environment.   Pt's spiritual, cultural and educational needs considered and pt agreeable to plan of care and goals.    Anticipated barriers to physical therapy: scheduling    Goals:  Short Term Goals: 3 weeks   1. Pt will be instructed in a HEP to address her neck, B shoulder and B knees.  2. Improve R knee flexion to 118 degrees to improve functional ROM        Long Term Goals: 8 weeks   1. Pt will be independent in a HEP to assist in managing her B knee, B shoulder and neck pain  2. Improve R knee flexion to 123 degrees to improve functional ROM  3. Improve R knee extension to 3 degrees form full extension to improve functional ROM    Plan     Continue to progress B LE strengthening as tolerated.     Alley Mcdermott, PTA

## 2020-06-29 PROCEDURE — 99454 REM MNTR PHYSIOL PARAM 16-30: CPT | Mod: PBBFAC,PN | Performed by: FAMILY MEDICINE

## 2020-06-30 PROCEDURE — 99457 PR MONITORING, PHYSIOL PARAM, REMOTE, 1ST 20 MINS, PER MONTH: ICD-10-PCS | Mod: S$PBB,,, | Performed by: FAMILY MEDICINE

## 2020-06-30 PROCEDURE — 99457 RPM TX MGMT 1ST 20 MIN: CPT | Mod: S$PBB,,, | Performed by: FAMILY MEDICINE

## 2020-07-01 ENCOUNTER — CLINICAL SUPPORT (OUTPATIENT)
Dept: REHABILITATION | Facility: HOSPITAL | Age: 85
End: 2020-07-01
Attending: INTERNAL MEDICINE
Payer: MEDICARE

## 2020-07-01 ENCOUNTER — OFFICE VISIT (OUTPATIENT)
Dept: ORTHOPEDICS | Facility: CLINIC | Age: 85
End: 2020-07-01
Payer: MEDICARE

## 2020-07-01 VITALS — HEIGHT: 64 IN | BODY MASS INDEX: 24.07 KG/M2 | WEIGHT: 141 LBS

## 2020-07-01 DIAGNOSIS — M17.0 PRIMARY OSTEOARTHRITIS OF BOTH KNEES: Primary | ICD-10-CM

## 2020-07-01 DIAGNOSIS — M25.662 KNEE JOINT STIFFNESS, BILATERAL: ICD-10-CM

## 2020-07-01 DIAGNOSIS — M25.612 SHOULDER JOINT STIFFNESS, BILATERAL: ICD-10-CM

## 2020-07-01 DIAGNOSIS — M25.611 SHOULDER JOINT STIFFNESS, BILATERAL: ICD-10-CM

## 2020-07-01 DIAGNOSIS — M25.661 KNEE JOINT STIFFNESS, BILATERAL: ICD-10-CM

## 2020-07-01 DIAGNOSIS — M53.86 DECREASED ROM OF LUMBAR SPINE: ICD-10-CM

## 2020-07-01 PROCEDURE — 99213 OFFICE O/P EST LOW 20 MIN: CPT | Mod: S$PBB,,, | Performed by: ORTHOPAEDIC SURGERY

## 2020-07-01 PROCEDURE — 99999 PR PBB SHADOW E&M-EST. PATIENT-LVL III: ICD-10-PCS | Mod: PBBFAC,,, | Performed by: ORTHOPAEDIC SURGERY

## 2020-07-01 PROCEDURE — 99213 OFFICE O/P EST LOW 20 MIN: CPT | Mod: PBBFAC | Performed by: ORTHOPAEDIC SURGERY

## 2020-07-01 PROCEDURE — 99213 PR OFFICE/OUTPT VISIT, EST, LEVL III, 20-29 MIN: ICD-10-PCS | Mod: S$PBB,,, | Performed by: ORTHOPAEDIC SURGERY

## 2020-07-01 PROCEDURE — 97110 THERAPEUTIC EXERCISES: CPT | Mod: PO

## 2020-07-01 PROCEDURE — 99999 PR PBB SHADOW E&M-EST. PATIENT-LVL III: CPT | Mod: PBBFAC,,, | Performed by: ORTHOPAEDIC SURGERY

## 2020-07-01 NOTE — PROGRESS NOTES
Physical Therapy Daily Treatment Note     Name: Gerda Lai  Clinic Number: [id number]    Therapy Diagnosis:   Encounter Diagnoses   Name Primary?    Shoulder joint stiffness, bilateral     Decreased ROM of lumbar spine     Knee joint stiffness, bilateral      Physician: Cesar Burroughs MD    Visit Date: 7/1/2020    Physician Orders: PT Eval and Treat      Medical Diagnosis from Referral:   M47.812 (ICD-10-CM) - Cervical spondylosis   M75.101,M12.811,M12.812,M75.102 (ICD-10-CM) - Rotator cuff tear arthropathy of both shoulders   M17.0 (ICD-10-CM) - Primary osteoarthritis of both knees      Evaluation Date: 6/4/2020  Authorization Period Expiration: 12/31/2020  Plan of Care Expiration: 7/31/2020  Visit # / Visits authorized: (6 total  )      Time In: 4:00 pm  Time Out: 4:45 pm   Total Billable Time:   40 minutes      Precautions: Standard    Subjective     Pt reports:  That she may have knee surgery in August and may need to alter her PT appointments to have enough visits post-op.   She was compliant with home exercise program.  Response to previous treatment: increased knee pain  Functional change: none    Pain: 2-3 /10  Location: bilateral knees      Objective     BP measurements taken prior to session: 154/60    Gerda received therapeutic exercises to develop strength, endurance and ROM for 45 minutes including:    Quad sets : 2 x 10 , 5'' hold - np  SLR- 2 x 10 B   SAQ 10 x 2 B # 3lb weight   Clams 10 x 2 on R & L  Hook lying B hip add 10 x 2 with a ball  Bridges : 2 x 10 reps   Double leg shuttle: 2 x 10 #1.5 bands   Recumbent stationary bike x 6 minutes    BP taken following session: NT    Home Exercises Provided and Patient Education Provided     Education provided:   - Continue previous HEP    Written Home Exercises Provided: Patient instructed to cont prior HEP.  Exercises were reviewed and Gerda was able to demonstrate them prior to the end of the session.  Gerda demonstrated good  understanding  of the education provided.     See EMR under Patient Instructions for exercises provided 6/12/2020.    Assessment      decrease Pt's visits to one time per week to allow for PT visits following her knee surgery. Will continue to progress strengthening as tolerated.     Gerda is progressing well towards her goals.   Pt prognosis is Fair.     Pt will continue to benefit from skilled outpatient physical therapy to address the deficits listed in the problem list box on initial evaluation, provide pt/family education and to maximize pt's level of independence in the home and community environment.   Pt's spiritual, cultural and educational needs considered and pt agreeable to plan of care and goals.    Anticipated barriers to physical therapy: scheduling    Goals:  Short Term Goals: 3 weeks   1. Pt will be instructed in a HEP to address her neck, B shoulder and B knees.  2. Improve R knee flexion to 118 degrees to improve functional ROM        Long Term Goals: 8 weeks   1. Pt will be independent in a HEP to assist in managing her B knee, B shoulder and neck pain  2. Improve R knee flexion to 123 degrees to improve functional ROM  3. Improve R knee extension to 3 degrees form full extension to improve functional ROM    Plan     Continue to progress B LE strengthening as tolerated.     Ebenezer Sanchez, PT

## 2020-07-02 DIAGNOSIS — Z01.818 PRE-OP TESTING: ICD-10-CM

## 2020-07-02 NOTE — PROGRESS NOTES
Subjective:      Patient ID: Gerda Lai is a 87 y.o. female.    Chief Complaint:   Pain of the Left Knee and Pain of the Right Knee    HPI  She has been followed by us for some time for osteoarthritis of the knees.  She has predominantly left knee pain, with only intermittent and less severe right knee pain.  Her left knee pain is limiting her ADLs, and causing night pain interfering with sleep.  She has had cortisone injections, most recently about 4 months ago.  Hip on the left side, this did not help as much as usual.  She feels she is at the end of my rope.  She is referred over to discuss total knee replacement.  She has a right total hip in place with no symptoms there.  She has no left groin pain, distal numbness or tingling.  Objective:        Ortho/SPM Exam    Left knee:  There is is a mild valgus deformity, which is passively correctable.  Active range of motion is 0-110 degrees.  Anterior crepitus with active extension.  Patellar mobility is limited.  Boggy synovitis without effusion.  Medial joint line tenderness predominates.  No instability to varus/valgus/Lachman's stressing.  No pain in the groin with flexion and internal rotation of the hip which is not limited.  Skin intact.  Distal neurovascular intact.  Flip test negative.        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  She is osteoporotic, but I do not see any focal bone lesions.  She does have a history of breast cancer.  Assessment:     Advanced DJD, left knee    Plan:   Left TKA.  I explained to her that I would likely not resurface her patella because of the advanced osteoporosis, the rationale for this.  She will obviously need medical clearance. We reviewed the risks and benefits of the surgery with the patient and/or family prior to surgery including but not limited to risk of infection, bleeding, injury to nerves and blood vessels, need for revision surgery, continued pain,  blood clots, cardiopulmonary complications, death.  After discussing the risks and benefits of the procedure they would like to proceed with surgery.       The patient's pathophysiology was explained in detail with reference to x-rays, models, other visual aids as appropriate.  Treatment options were discussed in detail.  Questions were invited and answered to the patient's satisfaction.    Greater than 50% of this 15 minute visit was devoted to counseling and coordination of care      Kalin Pacheco MD  Orthopedic Surgery

## 2020-07-06 ENCOUNTER — CLINICAL SUPPORT (OUTPATIENT)
Dept: OPHTHALMOLOGY | Facility: CLINIC | Age: 85
End: 2020-07-06
Payer: MEDICARE

## 2020-07-06 ENCOUNTER — OFFICE VISIT (OUTPATIENT)
Dept: OPTOMETRY | Facility: CLINIC | Age: 85
End: 2020-07-06
Payer: MEDICARE

## 2020-07-06 ENCOUNTER — OFFICE VISIT (OUTPATIENT)
Dept: INTERNAL MEDICINE | Facility: CLINIC | Age: 85
End: 2020-07-06
Payer: MEDICARE

## 2020-07-06 VITALS
WEIGHT: 142.88 LBS | SYSTOLIC BLOOD PRESSURE: 110 MMHG | HEART RATE: 72 BPM | RESPIRATION RATE: 16 BRPM | BODY MASS INDEX: 25.32 KG/M2 | TEMPERATURE: 98 F | HEIGHT: 63 IN | DIASTOLIC BLOOD PRESSURE: 54 MMHG

## 2020-07-06 DIAGNOSIS — N18.30 CKD (CHRONIC KIDNEY DISEASE), STAGE III: Chronic | ICD-10-CM

## 2020-07-06 DIAGNOSIS — F41.9 ANXIETY: Chronic | ICD-10-CM

## 2020-07-06 DIAGNOSIS — I70.0 AORTIC ATHEROSCLEROSIS: Chronic | ICD-10-CM

## 2020-07-06 DIAGNOSIS — I10 HYPERTENSION, ESSENTIAL: Primary | Chronic | ICD-10-CM

## 2020-07-06 DIAGNOSIS — H53.9 TRANSIENT VISION DISTURBANCE OF BOTH EYES: Primary | ICD-10-CM

## 2020-07-06 DIAGNOSIS — I10 ESSENTIAL HYPERTENSION: ICD-10-CM

## 2020-07-06 DIAGNOSIS — E78.5 HYPERLIPIDEMIA: ICD-10-CM

## 2020-07-06 DIAGNOSIS — E78.00 PURE HYPERCHOLESTEROLEMIA: Chronic | ICD-10-CM

## 2020-07-06 PROCEDURE — 92012 PR EYE EXAM, EST PATIENT,INTERMED: ICD-10-PCS | Mod: S$PBB,,, | Performed by: OPTOMETRIST

## 2020-07-06 PROCEDURE — 92083 HUMPHREY VISUAL FIELD - OU - BOTH EYES: ICD-10-PCS | Mod: 26,S$PBB,, | Performed by: OPTOMETRIST

## 2020-07-06 PROCEDURE — 99213 OFFICE O/P EST LOW 20 MIN: CPT | Mod: PBBFAC,27,PO,25 | Performed by: OPTOMETRIST

## 2020-07-06 PROCEDURE — 99999 PR PBB SHADOW E&M-EST. PATIENT-LVL IV: CPT | Mod: PBBFAC,,, | Performed by: INTERNAL MEDICINE

## 2020-07-06 PROCEDURE — 92012 INTRM OPH EXAM EST PATIENT: CPT | Mod: S$PBB,,, | Performed by: OPTOMETRIST

## 2020-07-06 PROCEDURE — 99999 PR PBB SHADOW E&M-EST. PATIENT-LVL IV: ICD-10-PCS | Mod: PBBFAC,,, | Performed by: INTERNAL MEDICINE

## 2020-07-06 PROCEDURE — 99214 OFFICE O/P EST MOD 30 MIN: CPT | Mod: PBBFAC,PO,25 | Performed by: INTERNAL MEDICINE

## 2020-07-06 PROCEDURE — 80307 DRUG TEST PRSMV CHEM ANLYZR: CPT

## 2020-07-06 PROCEDURE — 99999 PR PBB SHADOW E&M-EST. PATIENT-LVL III: ICD-10-PCS | Mod: PBBFAC,,, | Performed by: OPTOMETRIST

## 2020-07-06 PROCEDURE — 99214 PR OFFICE/OUTPT VISIT, EST, LEVL IV, 30-39 MIN: ICD-10-PCS | Mod: S$PBB,,, | Performed by: INTERNAL MEDICINE

## 2020-07-06 PROCEDURE — 99214 OFFICE O/P EST MOD 30 MIN: CPT | Mod: S$PBB,,, | Performed by: INTERNAL MEDICINE

## 2020-07-06 PROCEDURE — 99999 PR PBB SHADOW E&M-EST. PATIENT-LVL III: CPT | Mod: PBBFAC,,, | Performed by: OPTOMETRIST

## 2020-07-06 PROCEDURE — 92083 EXTENDED VISUAL FIELD XM: CPT | Mod: PBBFAC,PO | Performed by: OPTOMETRIST

## 2020-07-06 RX ORDER — LABETALOL 100 MG/1
TABLET, FILM COATED ORAL
Qty: 360 TABLET | Refills: 3 | Status: ON HOLD | OUTPATIENT
Start: 2020-07-06 | End: 2020-08-10

## 2020-07-06 RX ORDER — DOXAZOSIN 1 MG/1
1 TABLET ORAL DAILY
Qty: 90 TABLET | Refills: 3 | Status: SHIPPED | OUTPATIENT
Start: 2020-07-06 | End: 2021-08-29

## 2020-07-06 RX ORDER — ALISKIREN 300 MG/1
300 TABLET, FILM COATED ORAL DAILY
Qty: 90 TABLET | Refills: 3 | Status: SHIPPED | OUTPATIENT
Start: 2020-07-06 | End: 2021-07-20

## 2020-07-06 RX ORDER — LORAZEPAM 0.5 MG/1
.25-.5 TABLET ORAL EVERY 12 HOURS PRN
Qty: 30 TABLET | Refills: 0 | Status: SHIPPED | OUTPATIENT
Start: 2020-07-06 | End: 2020-08-17

## 2020-07-06 RX ORDER — FELODIPINE 2.5 MG/1
TABLET, EXTENDED RELEASE ORAL
Qty: 180 TABLET | Refills: 3 | Status: SHIPPED | OUTPATIENT
Start: 2020-07-06 | End: 2020-10-16

## 2020-07-06 RX ORDER — PRAVASTATIN SODIUM 40 MG/1
40 TABLET ORAL DAILY
Qty: 90 TABLET | Refills: 3 | Status: SHIPPED | OUTPATIENT
Start: 2020-07-06 | End: 2021-10-04

## 2020-07-06 NOTE — PROGRESS NOTES
"HPI     DLS; 5/27/202  Pt states she is no longer seeing the squiggly lines that she knows of.   HVF  Done today   No f/f  systane gtts     Last edited by Luis Daniel Linares, OD on 7/6/2020  3:05 PM. (History)        ROS     Positive for: Eyes (cat surgery OU)    Negative for: Constitutional, Gastrointestinal, Neurological, Skin,   Genitourinary, Musculoskeletal, HENT, Endocrine, Cardiovascular,   Respiratory, Psychiatric, Allergic/Imm, Heme/Lymph    Last edited by Luis Daniel Linares, OD on 7/6/2020  3:05 PM. (History)        Assessment /Plan     For exam results, see Encounter Report.    Transient vision disturbance of both eyes  -     Stevens Visual Field - OU - Extended - Both Eyes      See notes from last month:  1. Mild pco OD sp pciol ou/YAG OS--pt happy w spex Rx  2. DAMION/rosacea--pt to cont w ATs  3. Pt presents w complaints of "squiggly lines" which appear in her vision, last a few seconds, then go away.  She is NOT using ATs as directed.  She ALSO reports wasd put on a new med which threw her BP out of whack, but since she stopped the med a few days ago she has not had any lines appear.  She DOES reports episode of hazy va today, but it cleared w blink.  Suspect the squiggly lines were from BP flux, but pt reports her BP now stabilized.  She ALSO has dry eye issues and is NOT using the ATs as directed    VF TODAY wnl OU.  Pt reports has not had squiggly lines since last visit.  Suspect BP problems from med caused disturbances, now resolved    PLAN:    rtc as sched for routine, or sooner if visual disturbances recur                 "

## 2020-07-06 NOTE — PROGRESS NOTES
24-2  Sf done ou     Rel & Fix =  Fair od                     Good os      Coop =    Good     Patient denies any allergies to latex/eye patch at this time    jthomas    MRX     -.25 + .75 X 60  OD   -1.00 + 1.00 X 35  OS

## 2020-07-06 NOTE — PROGRESS NOTES
Subjective:       Patient ID: Gerda Lai is a 87 y.o. female.    Chief Complaint: Establish Care    HPI     87-year-old female here to establish care.    HTN -  Patient's co morbidities include: HLD. Patient is currently on Cardura 1 mg, felodipine 2.5 mg, labetalol 100 mg. She does check her BP at home, and it runs 167/52 at home this am.  The blood pressure runs from 155-160s. Side effects of medications note: none. Denies headaches, blurred vision, chest pain, shortness of breath, nausea.  She got the BP cuff from [facility] for digital HTN.    HLD - Patient is currently on pravastatin 40 mg.  Her last lipid panel was   Cholesterol   Date Value Ref Range Status   01/09/2020 149 120 - 199 mg/dL Final     Comment:     The National Cholesterol Education Program (NCEP) has set the  following guidelines (reference ranges) for Cholesterol:  Optimal.....................<200 mg/dL  Borderline High.............200-239 mg/dL  High........................> or = 240 mg/dL       Triglycerides   Date Value Ref Range Status   01/09/2020 134 30 - 150 mg/dL Final     Comment:     The National Cholesterol Education Program (NCEP) has set the  following guidelines (reference values) for triglycerides:  Normal......................<150 mg/dL  Borderline High.............150-199 mg/dL  High........................200-499 mg/dL       HDL   Date Value Ref Range Status   01/09/2020 46 40 - 75 mg/dL Final     Comment:     The National Cholesterol Education Program (NCEP) has set the  following guidelines (reference values) for HDL Cholesterol:  Low...............<40 mg/dL  Optimal...........>60 mg/dL       LDL Cholesterol   Date Value Ref Range Status   01/09/2020 76.2 63.0 - 159.0 mg/dL Final     Comment:     The National Cholesterol Education Program (NCEP) has set the  following guidelines (reference values) for LDL Cholesterol:  Optimal.......................<130 mg/dL  Borderline High...............130-159  mg/dL  High..........................160-189 mg/dL  Very High.....................>190 mg/dL     .  Side effects of the medication:  None.    She is on ativan for her nerves.  She takes a half pill in the am and a half pill at night.      Review of Systems   Constitutional: Negative for chills, fever and unexpected weight change.   HENT: Negative for nasal congestion, postnasal drip and sore throat.    Eyes: Negative for redness and visual disturbance.   Respiratory: Negative for cough and shortness of breath.    Cardiovascular: Negative for chest pain and palpitations.   Gastrointestinal: Negative for abdominal pain, constipation, diarrhea, nausea and vomiting.   Genitourinary: Negative for dysuria, frequency and hematuria.   Musculoskeletal: Negative for arthralgias and myalgias.   Integumentary:  Negative for color change and rash.   Neurological: Negative for dizziness and headaches.         Objective:      Physical Exam  Vitals signs reviewed.   Constitutional:       Appearance: She is well-developed.   HENT:      Head: Normocephalic and atraumatic.      Mouth/Throat:      Pharynx: No oropharyngeal exudate.   Eyes:      General: No scleral icterus.        Right eye: No discharge.         Left eye: No discharge.      Pupils: Pupils are equal, round, and reactive to light.   Neck:      Musculoskeletal: Normal range of motion and neck supple.      Thyroid: No thyromegaly.      Trachea: No tracheal deviation.   Cardiovascular:      Rate and Rhythm: Normal rate and regular rhythm.      Heart sounds: Normal heart sounds. No murmur. No friction rub. No gallop.    Pulmonary:      Effort: Pulmonary effort is normal. No respiratory distress.      Breath sounds: Normal breath sounds. No wheezing or rales.   Chest:      Chest wall: No tenderness.   Abdominal:      General: Bowel sounds are normal. There is no distension.      Palpations: Abdomen is soft. There is no mass.      Tenderness: There is no abdominal tenderness.  There is no guarding or rebound.   Musculoskeletal: Normal range of motion.         General: No tenderness.   Skin:     General: Skin is warm and dry.      Coloration: Skin is not pale.      Findings: No erythema or rash.   Neurological:      Mental Status: She is alert and oriented to person, place, and time.   Psychiatric:         Behavior: Behavior normal.         Assessment:       1. Hypertension, essential    2. Essential hypertension    3. Hyperlipidemia    4. Pure hypercholesterolemia    5. Aortic atherosclerosis    6. CKD (chronic kidney disease), stage III    7. Anxiety        Plan:       1/2.  Continue Cardura 1 mg, felodipine 2.5 mg, labetalol 100 mg.  3/4.  Continue pravastatin 40 mg  5.  Continue pravastatin 40 mg.  6.  Monitor.  7.  Ativan 0.5 mg half tablet twice daily prescribed.  Pain contract signed.  Urine tox screen done.

## 2020-07-07 ENCOUNTER — CLINICAL SUPPORT (OUTPATIENT)
Dept: REHABILITATION | Facility: HOSPITAL | Age: 85
End: 2020-07-07
Attending: INTERNAL MEDICINE
Payer: MEDICARE

## 2020-07-07 DIAGNOSIS — M25.662 KNEE JOINT STIFFNESS, BILATERAL: ICD-10-CM

## 2020-07-07 DIAGNOSIS — M25.661 KNEE JOINT STIFFNESS, BILATERAL: ICD-10-CM

## 2020-07-07 DIAGNOSIS — M25.612 SHOULDER JOINT STIFFNESS, BILATERAL: ICD-10-CM

## 2020-07-07 DIAGNOSIS — M25.611 SHOULDER JOINT STIFFNESS, BILATERAL: ICD-10-CM

## 2020-07-07 DIAGNOSIS — M53.86 DECREASED ROM OF LUMBAR SPINE: ICD-10-CM

## 2020-07-07 LAB
AMPHET+METHAMPHET UR QL: NEGATIVE
BARBITURATES UR QL SCN>200 NG/ML: NEGATIVE
BENZODIAZ UR QL SCN>200 NG/ML: NEGATIVE
BZE UR QL SCN: NEGATIVE
CANNABINOIDS UR QL SCN: NEGATIVE
CREAT UR-MCNC: 151 MG/DL (ref 15–325)
ETHANOL UR-MCNC: <10 MG/DL
METHADONE UR QL SCN>300 NG/ML: NEGATIVE
OPIATES UR QL SCN: NEGATIVE
PCP UR QL SCN>25 NG/ML: NEGATIVE
TOXICOLOGY INFORMATION: NORMAL

## 2020-07-07 PROCEDURE — 97110 THERAPEUTIC EXERCISES: CPT | Mod: PO,CQ

## 2020-07-07 NOTE — PROGRESS NOTES
"  Physical Therapy Daily Treatment Note     Name: Gerda Lai  Clinic Number: [id number]    Therapy Diagnosis:   Encounter Diagnoses   Name Primary?    Shoulder joint stiffness, bilateral     Decreased ROM of lumbar spine     Knee joint stiffness, bilateral      Physician: Cesar Burroughs MD    Visit Date: 7/7/2020    Physician Orders: PT Eval and Treat      Medical Diagnosis from Referral:   M47.812 (ICD-10-CM) - Cervical spondylosis   M75.101,M12.811,M12.812,M75.102 (ICD-10-CM) - Rotator cuff tear arthropathy of both shoulders   M17.0 (ICD-10-CM) - Primary osteoarthritis of both knees      Evaluation Date: 6/4/2020  Authorization Period Expiration: 12/31/2020  Plan of Care Expiration: 7/31/2020  Visit # / Visits authorized: (7 total  )      Time In: 3:00 pm  Time Out: 3:45 pm  Total Billable Time:   45 minutes      Precautions: Standard    Subjective     Pt reports:  "I cant complain, doing pretty good today" .   She was compliant with home exercise program.  Response to previous treatment: increased knee pain  Functional change: none    Pain: 2-3 /10  Location: bilateral knees      Objective     BP measurements taken prior to session: 145/64    Gerda received therapeutic exercises to develop strength, endurance and ROM for 45 minutes including:    Quad sets : 2 x 10 , 5'' hold - np  SLR- 2 x 10 B   SAQ 10 x 2 B # 3lb weight   Clams 10 x 2 on R & L  Hook lying B hip add 10 x 2 with a ball  Bridges : 2 x 10 reps   Double leg shuttle: 2 x 10 #1.5 bands   Recumbent stationary bike x 6 minutes    BP taken following session: NT    Home Exercises Provided and Patient Education Provided     Education provided:   - Continue previous HEP    Written Home Exercises Provided: Patient instructed to cont prior HEP.  Exercises were reviewed and Gerda was able to demonstrate them prior to the end of the session.  Gerda demonstrated good  understanding of the education provided.     See EMR under Patient Instructions for " exercises provided 6/12/2020.    Assessment     Pt was able to complete session with no adverse effects. Pt reports feeling looser and less pain following session. Pt reports possibly getting a TKA in August and due to this she has dropped down to one session a week to save visits for after her surgery.     Gerda is progressing well towards her goals.   Pt prognosis is Fair.     Pt will continue to benefit from skilled outpatient physical therapy to address the deficits listed in the problem list box on initial evaluation, provide pt/family education and to maximize pt's level of independence in the home and community environment.   Pt's spiritual, cultural and educational needs considered and pt agreeable to plan of care and goals.    Anticipated barriers to physical therapy: scheduling    Goals:  Short Term Goals: 3 weeks   1. Pt will be instructed in a HEP to address her neck, B shoulder and B knees.  2. Improve R knee flexion to 118 degrees to improve functional ROM        Long Term Goals: 8 weeks   1. Pt will be independent in a HEP to assist in managing her B knee, B shoulder and neck pain  2. Improve R knee flexion to 123 degrees to improve functional ROM  3. Improve R knee extension to 3 degrees form full extension to improve functional ROM    Plan     Continue to progress B LE strengthening as tolerated.     Alley Mcdermott, PTA

## 2020-07-08 ENCOUNTER — TELEPHONE (OUTPATIENT)
Dept: ORTHOPEDICS | Facility: CLINIC | Age: 85
End: 2020-07-08

## 2020-07-08 ENCOUNTER — PATIENT OUTREACH (OUTPATIENT)
Dept: OTHER | Facility: OTHER | Age: 85
End: 2020-07-08

## 2020-07-08 DIAGNOSIS — M17.12 PRIMARY OSTEOARTHRITIS OF LEFT KNEE: Primary | ICD-10-CM

## 2020-07-08 DIAGNOSIS — Z01.818 PRE-OP TESTING: Primary | ICD-10-CM

## 2020-07-08 NOTE — PROGRESS NOTES
Digital Medicine: Clinician Follow-Up    Called Gerda Lai for hypertension follow-up: BP avg 158/67, goal is <150/90.    The history is provided by the patient. No  was used.   Follow Up  Follow-up reason(s): reading review    - Ms. Lorenz was happy to hear from me to report that she has fully transitioned over to a new PCP Tomas Beltran. She reports having issues with her device not transmitting reading over. She charged her device and that seemed to resolve the problem. She confirmed not using the diclofenac gel anymore. Recent office visits reveal well-controlled blood pressure readings. She is planning to have knee surgery and questions if her blood pressure is stable enough to have it done.       INTERVENTION(S)  encouragement/support    PLAN  patient verbalizes understanding, await MD intervention and continue monitoring    Current 30-day BP avg of 158/67 is near goal of <150/90.  Reviewed readings: SBP/DBP trending upwards; DBP continues to be well-controlled.     Continue current regimen.   Messaged cardiologist Dr. Camarena for advisement on knee surgery and BP.    Follow-up in 3-4 weeks.      There are no preventive care reminders to display for this patient.    Last 5 Patient Entered Readings                                      Current 30 Day Average: 158/67     Recent Readings 7/6/2020 7/6/2020 6/24/2020 6/24/2020 6/23/2020    SBP (mmHg) 164 164 159 159 153    DBP (mmHg) 71 71 68 68 66    Pulse 56 56 58 58 57             Hypertension Medications             aliskiren (TEKTURNA) 300 MG Tab Take 1 tablet (300 mg total) by mouth once daily.    doxazosin (CARDURA) 1 MG tablet Take 1 tablet (1 mg total) by mouth once daily.    felodipine (PLENDIL) 2.5 MG Tb24 TAKE ONE TABLET IN THE MORNING AND ONE TABLET IN THE EVENING    labetaloL (NORMODYNE) 100 MG tablet TAKE 2 TABLETS (200 MG TOTAL) BY MOUTH EVERY 12 (TWELVE) HOURS.                 Screenings

## 2020-07-08 NOTE — TELEPHONE ENCOUNTER
Spoke to pt, scheduled surgery for L knee on 8/10, scheduled pre-op/joint class/ post-op and Covid test,  Mailed slips, pt pleased and verbalized understanding.

## 2020-07-16 ENCOUNTER — TELEPHONE (OUTPATIENT)
Dept: INTERNAL MEDICINE | Facility: CLINIC | Age: 85
End: 2020-07-16

## 2020-07-16 ENCOUNTER — CLINICAL SUPPORT (OUTPATIENT)
Dept: REHABILITATION | Facility: HOSPITAL | Age: 85
End: 2020-07-16
Attending: INTERNAL MEDICINE
Payer: MEDICARE

## 2020-07-16 DIAGNOSIS — M25.611 SHOULDER JOINT STIFFNESS, BILATERAL: ICD-10-CM

## 2020-07-16 DIAGNOSIS — M25.661 KNEE JOINT STIFFNESS, BILATERAL: ICD-10-CM

## 2020-07-16 DIAGNOSIS — M25.612 SHOULDER JOINT STIFFNESS, BILATERAL: ICD-10-CM

## 2020-07-16 DIAGNOSIS — M53.86 DECREASED ROM OF LUMBAR SPINE: ICD-10-CM

## 2020-07-16 DIAGNOSIS — M25.662 KNEE JOINT STIFFNESS, BILATERAL: ICD-10-CM

## 2020-07-16 PROCEDURE — 97110 THERAPEUTIC EXERCISES: CPT | Mod: PO

## 2020-07-16 NOTE — PROGRESS NOTES
Physical Therapy Daily Treatment Note     Name: Gerda Lai  Clinic Number: [id number]    Therapy Diagnosis:   Encounter Diagnoses   Name Primary?   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   She was compliant with home exercise program.  Response to previous treatment: increased knee pain  Functional change: none    Pain: 2-3 /10  Location: bilateral knees      Objective     BP measurements taken prior to session: 145/64    Gerda received therapeutic exercises to develop strength, endurance and ROM for 45 minutes including:    Quad sets : 2 x 10 , 5'' hold - np  SLR- 2 x 10 B   SAQ 10 x 2 B # 3lb weight   Clams 10 x 2 on R & L  Hook lying B hip add 10 x 2 with a ball  Bridges : 2 x 10 reps   Double leg shuttle: 2 x 10 #2 bands   Recumbent stationary bike x 6 minutes    BP taken following session: NT    Home Exercises Provided and Patient Education Provided     Education provided:   - Continue previous HEP    Written Home Exercises Provided: Patient instructed to cont prior HEP.  Exercises were reviewed and eGrda was able to demonstrate them prior to the end of the session.  Gerda demonstrated good  understanding of the education provided.     See EMR under  Patient Instructions for exercises provided 6/12/2020.    Assessment     Pt was able to tolerate increased resistance on the shuttle with leg presses today.  Pt is scheduled for a L TKA on 8/10/2020.  Continue PT Tx 1x/week.       Gerda is progressing well towards her goals.   Pt prognosis is Fair.     Pt will continue to benefit from skilled outpatient physical therapy to address the deficits listed in the problem list box on initial evaluation, provide pt/family education and to maximize pt's level of independence in the home and community environment.   Pt's spiritual, cultural and educational needs considered and pt agreeable to plan of care and goals.    Anticipated barriers to physical therapy: scheduling    Goals:  Short Term Goals: 3 weeks   1. Pt will be instructed in a HEP to address her neck, B shoulder and B knees.  2. Improve R knee flexion to 118 degrees to improve functional ROM        Long Term Goals: 8 weeks   1. Pt will be independent in a HEP to assist in managing her B knee, B shoulder and neck pain  2. Improve R knee flexion to 123 degrees to improve functional ROM  3. Improve R knee extension to 3 degrees form full extension to improve functional ROM    Plan     Continue to progress B LE strengthening as tolerated.     Ebenezer Sanchez, PT

## 2020-07-17 ENCOUNTER — OFFICE VISIT (OUTPATIENT)
Dept: INTERNAL MEDICINE | Facility: CLINIC | Age: 85
End: 2020-07-17
Payer: MEDICARE

## 2020-07-17 VITALS
DIASTOLIC BLOOD PRESSURE: 78 MMHG | SYSTOLIC BLOOD PRESSURE: 130 MMHG | BODY MASS INDEX: 25.04 KG/M2 | HEART RATE: 58 BPM | WEIGHT: 141.31 LBS | HEIGHT: 63 IN

## 2020-07-17 DIAGNOSIS — Z85.3 PERSONAL HISTORY OF BREAST CANCER: ICD-10-CM

## 2020-07-17 DIAGNOSIS — Z99.89 DEPENDENCE ON OTHER ENABLING MACHINES AND DEVICES: ICD-10-CM

## 2020-07-17 DIAGNOSIS — R26.9 ABNORMALITY OF GAIT AND MOBILITY: ICD-10-CM

## 2020-07-17 DIAGNOSIS — I10 HYPERTENSION, ESSENTIAL: Chronic | ICD-10-CM

## 2020-07-17 DIAGNOSIS — N18.30 CKD (CHRONIC KIDNEY DISEASE), STAGE III: Chronic | ICD-10-CM

## 2020-07-17 DIAGNOSIS — M54.17 LUMBOSACRAL RADICULOPATHY AT L5: ICD-10-CM

## 2020-07-17 DIAGNOSIS — M47.26 OTHER SPONDYLOSIS WITH RADICULOPATHY, LUMBAR REGION: ICD-10-CM

## 2020-07-17 DIAGNOSIS — Z00.00 ENCOUNTER FOR PREVENTIVE HEALTH EXAMINATION: Primary | ICD-10-CM

## 2020-07-17 DIAGNOSIS — I77.1 SUBCLAVIAN ARTERY STENOSIS, RIGHT: ICD-10-CM

## 2020-07-17 DIAGNOSIS — Z85.828 PERSONAL HISTORY OF SKIN CANCER: ICD-10-CM

## 2020-07-17 DIAGNOSIS — I77.9 BILATERAL CAROTID ARTERY DISEASE, UNSPECIFIED TYPE: ICD-10-CM

## 2020-07-17 DIAGNOSIS — I70.0 AORTIC ATHEROSCLEROSIS: Chronic | ICD-10-CM

## 2020-07-17 DIAGNOSIS — M47.812 CERVICAL SPONDYLOSIS: ICD-10-CM

## 2020-07-17 DIAGNOSIS — E78.5 HYPERLIPIDEMIA, UNSPECIFIED HYPERLIPIDEMIA TYPE: Chronic | ICD-10-CM

## 2020-07-17 DIAGNOSIS — F41.9 ANXIETY: Chronic | ICD-10-CM

## 2020-07-17 DIAGNOSIS — M15.9 PRIMARY OSTEOARTHRITIS INVOLVING MULTIPLE JOINTS: ICD-10-CM

## 2020-07-17 PROCEDURE — G0439 PR MEDICARE ANNUAL WELLNESS SUBSEQUENT VISIT: ICD-10-PCS | Mod: S$GLB,,, | Performed by: NURSE PRACTITIONER

## 2020-07-17 PROCEDURE — 99999 PR PBB SHADOW E&M-EST. PATIENT-LVL V: CPT | Mod: PBBFAC,,, | Performed by: NURSE PRACTITIONER

## 2020-07-17 PROCEDURE — 99215 OFFICE O/P EST HI 40 MIN: CPT | Mod: PBBFAC,PO | Performed by: NURSE PRACTITIONER

## 2020-07-17 PROCEDURE — G0439 PPPS, SUBSEQ VISIT: HCPCS | Mod: S$GLB,,, | Performed by: NURSE PRACTITIONER

## 2020-07-17 PROCEDURE — 99999 PR PBB SHADOW E&M-EST. PATIENT-LVL V: ICD-10-PCS | Mod: PBBFAC,,, | Performed by: NURSE PRACTITIONER

## 2020-07-17 NOTE — PATIENT INSTRUCTIONS
Counseling and Referral of Other Preventative  (Italic type indicates deductible and co-insurance are waived)    Patient Name: Gerda Lai  Today's Date: 7/17/2020    Health Maintenance       Date Due Completion Date    Shingles Vaccine (1 of 2) 02/14/1983 Obtain new shingles vaccine - SHINGRIX - when available    Influenza Vaccine (1) 09/01/2020 10/15/2018    Lipid Panel 01/09/2025 1/9/2020    TETANUS VACCINE 08/04/2025 8/4/2015        No orders of the defined types were placed in this encounter.    The following information is provided to all patients.  This information is to help you find resources for any of the problems found today that may be affecting your health:                Living healthy guide: www.[facility].[place].gov      Understanding Diabetes: www.diabetes.org      Eating healthy: www.cdc.gov/healthyweight      [facility] home safety checklist: www.cdc.gov/steadi/patient.html      Agency on Aging: www.goea.[place].[facility]      Alcoholics anonymous (AA): www.aa.org      Physical Activity: www.jose enrique.nih.gov/bv9oapk      Tobacco use: www.quitwithusla.org

## 2020-07-17 NOTE — PROGRESS NOTES
"  Gerda Lai presented for a  Medicare AWV and comprehensive Health Risk Assessment today. The following components were reviewed and updated:    · Medical history  · Family History  · Social history  · Allergies and Current Medications  · Health Risk Assessment  · Health Maintenance  · Care Team     ** See Completed Assessments for Annual Wellness Visit within the encounter summary.**       The following assessments were completed:  · Living Situation  · CAGE  · Depression Screening  · Timed Get Up and Go  · Whisper Test  · Cognitive Function Screening      ·   · Nutrition Screening  · ADL Screening  · PAQ Screening    Vitals:    07/17/20 1416   BP: 130/78   BP Location: Right arm   Pulse: (!) 58   Weight: 64.1 kg (141 lb 5 oz)   Height: 5' 3" (1.6 m)     Body mass index is 25.03 kg/m².  Physical Exam  Vitals signs and nursing note reviewed.   Constitutional:       Appearance: She is well-developed.   HENT:      Head: Normocephalic.   Cardiovascular:      Rate and Rhythm: Normal rate and regular rhythm.   Pulmonary:      Effort: Pulmonary effort is normal.      Breath sounds: Normal breath sounds.   Abdominal:      General: Bowel sounds are normal.      Palpations: Abdomen is soft.   Musculoskeletal:      Comments: Gait antalgic, ambulates with cane   Skin:     General: Skin is warm and dry.   Neurological:      Mental Status: She is alert and oriented to person, place, and time.      Motor: No abnormal muscle tone.   Psychiatric:         Mood and Affect: Mood normal.           Diagnoses and health risks identified today and associated recommendations/orders:    1. Encounter for preventive health examination  Here for Health Risk Assessment/Annual Wellness Visit.  Health maintenance reviewed and updated. Follow up in one year.  Discussed Shingrix    2. Hypertension, essential  Chronic, stable on current medications. Followed by PCP.    3. Aortic atherosclerosis  Chronic, stable on current medications. Noted CXR " 5/12/18. Followed by PCP.    4. Subclavian artery stenosis, right  Chronic, stable on current medications. Followed by PCP, Cardiology.    5. Bilateral carotid artery disease, unspecified type  Chronic, stable on current medications.50-59% noted on U/S 4/24/19.  Followed by PCP, Cardiology.    6. Hyperlipidemia, unspecified hyperlipidemia type  Chronic, stable on current medication. Followed by PCP, Cardiology.    7. CKD (chronic kidney disease), stage III  Chronic, stable. Followed by PCP.    8. Personal history of breast cancer  Stable. Followed by PCP.    9. Personal history of skin cancer  Stable. Followed by PCP, Dermatology.    10. Anxiety  Chronic, stable on current PRN medications. Followed by PCP.    11. Other spondylosis with radiculopathy, lumbar region  Chronic, stable on current medications. Followed by PCP, Rheumatology.    12. Cervical spondylosis  Chronic, stable on current medications. Followed by PCP, Rheumatology.    13. Lumbosacral radiculopathy at L5  Chronic, stable on current medications. Followed by PCP, Rheumatology.    14. Primary osteoarthritis involving multiple joints  Chronic, reporting increased left knee pain. Scheduled for TKA.  Followed by PCP, Orthopedics, Rheumatology.    15. Dependence on other enabling machines and devices  Chronic, no reported fall, using cane for ambulation. Followed by PCP.    16. Abnormality of gait and mobility  Chronic, no reported fall, using cane for ambulation. Followed by PCP.      Provided Gerda with a 5-10 year written screening schedule and personal prevention plan. Recommendations were developed using the USPSTF age appropriate recommendations. Education, counseling, and referrals were provided as needed. After Visit Summary printed and given to patient which includes a list of additional screenings\tests needed.    Follow up in about 6 months (around 1/21/2021).with PCP    Nikole Segundo NP

## 2020-07-28 ENCOUNTER — PATIENT OUTREACH (OUTPATIENT)
Dept: OTHER | Facility: OTHER | Age: 85
End: 2020-07-28

## 2020-07-28 ENCOUNTER — TELEPHONE (OUTPATIENT)
Dept: PREADMISSION TESTING | Facility: HOSPITAL | Age: 85
End: 2020-07-28

## 2020-07-28 ENCOUNTER — HOSPITAL ENCOUNTER (OUTPATIENT)
Dept: RADIOLOGY | Facility: HOSPITAL | Age: 85
Discharge: HOME | End: 2020-07-28
Attending: INTERNAL MEDICINE
Payer: MEDICARE

## 2020-07-28 ENCOUNTER — TELEPHONE (OUTPATIENT)
Dept: CARDIOLOGY | Facility: CLINIC | Age: 85
End: 2020-07-28

## 2020-07-28 DIAGNOSIS — Z85.3 PERSONAL HISTORY OF BREAST CANCER: ICD-10-CM

## 2020-07-28 DIAGNOSIS — M79.605 PAIN OF LEFT LOWER EXTREMITY: ICD-10-CM

## 2020-07-28 DIAGNOSIS — Z01.818 PREOP TESTING: Primary | ICD-10-CM

## 2020-07-28 DIAGNOSIS — I10 HYPERTENSION, ESSENTIAL: Primary | Chronic | ICD-10-CM

## 2020-07-28 DIAGNOSIS — E07.9 THYROID DISEASE: ICD-10-CM

## 2020-07-28 PROCEDURE — 77066 DX MAMMO INCL CAD BI: CPT | Mod: 26,,, | Performed by: RADIOLOGY

## 2020-07-28 PROCEDURE — 77066 MAMMO DIGITAL DIAGNOSTIC BILAT WITH TOMOSYNTHESIS_CAD: ICD-10-PCS | Mod: 26,,, | Performed by: RADIOLOGY

## 2020-07-28 PROCEDURE — 77062 BREAST TOMOSYNTHESIS BI: CPT | Mod: 26,,, | Performed by: RADIOLOGY

## 2020-07-28 PROCEDURE — 77062 MAMMO DIGITAL DIAGNOSTIC BILAT WITH TOMOSYNTHESIS_CAD: ICD-10-PCS | Mod: 26,,, | Performed by: RADIOLOGY

## 2020-07-28 PROCEDURE — 77062 BREAST TOMOSYNTHESIS BI: CPT | Mod: TC,PO

## 2020-07-28 NOTE — PROGRESS NOTES
Digital Medicine: Health  Follow-Up    The history is provided by the patient.             Reason for review: Blood pressure not at goal        Topics Covered on Call: upcoming surgery    Additional Follow-up details: Ms. Lorenz reports that she's doing well. Patient was finishing up a late lunch, so we had a short talk today. She informed me that she was going in for knee surgery on 8/10/20. She's hoping that this will help with her knee pain, help to increase her PA, and help her have a better quality of life.         Diet-Not assessed      Additional diet details:    Physical Activity-Change      She added physical therapy to Her physical activity routine.        She identified the following barriers to physical activity: pain        Additional physical activity details: Patient is still unable to do a lot of PA due to knee pain. She's still attending PT 1x/week.      Medication Adherence-Medication Adherence not addressed.      Substance, Sleep, Stress-Not assessed    Patient verbalizes understanding. Patient did not express questions or concerns and patient has contact information if needed.    Explained the importance of self-monitoring and medication adherence. Encouraged the patient to communicate with their health  for lifestyle modifications to help improve or maintain a healthy lifestyle.        There are no preventive care reminders to display for this patient.    Last 5 Patient Entered Readings                                      Current 30 Day Average: 166/70     Recent Readings 7/22/2020 7/22/2020 7/22/2020 7/22/2020 7/14/2020    SBP (mmHg) 177 177 190 190 157    DBP (mmHg) 68 68 72 72 69    Pulse 71 71 68 68 58

## 2020-07-28 NOTE — TELEPHONE ENCOUNTER
----- Message from Vera Dykes RN sent at 7/28/2020 11:12 AM CDT -----  Surgery date: 8/10   PreOp appt date:    Please call patient to schedule the following appointments:    Lab  POC  OPOC/NP    Thanks!    Vera Dykes

## 2020-07-28 NOTE — TELEPHONE ENCOUNTER
----- Message from Vera Dykes RN sent at 7/28/2020 11:35 AM CDT -----  Dear Dr. Camarena, your patient is scheduled for a left knee arthroplasty on 8/10/20 (with neuraxial anesthesia) . Anesthesia review is in progress.     Is the patient optimized for surgery? Please advise on cardiac clearance.    Thank you,   Vera Dykes RN  [facility]

## 2020-07-28 NOTE — PRE-PROCEDURE INSTRUCTIONS
Instructed to hold vitamins, herbal supplements and NSAIDS one week prior to surgery;  Patient verbalized understanding.  Patient instructed to call POC with any questions or changes.

## 2020-07-28 NOTE — ANESTHESIA PAT ROS NOTE
07/28/2020  Gerda Lai is a 87 y.o., female.      Pre-op Assessment          Review of Systems         Anesthesia Assessment: Preoperative EQUATION    Planned Procedure: Procedure(s) (LRB):  ARTHROPLASTY, KNEE-ADE NEXGEN/CEMENTED TIBIA (Left)  Requested Anesthesia Type:Regional  Surgeon: Kalin Pacheco MD  Service: Orthopedics  Known or anticipated Date of Surgery:8/10/2020    Surgeon notes: reviewed    Electronic QUestionnaire Assessment completed via nurse interview with patient.        Triage considerations:     The patient has no apparent active cardiac condition (No unstable coronary Syndrome such as severe unstable angina or recent [<1 month] myocardial infarction, decompensated CHF, severe valvular   disease or significant arrhythmia)    Previous anesthesia records:Not available    Last PCP note: within 3 months , within [facility] Dr Tomas Beltran  Subspecialty notes: Cardiology: General, Dermatology, Endocrinology, ENT, Hematology/Oncology, Ortho, Rheumatology    Other important co-morbidities: HLD, HTN, Hypothyroid, Obesity and Carotid artery Disease, Right subclavian artery stenosis, Aortic atherosclerosis, CKD stage 3, right breast cancer (sx and radiation), SCC, BCC, lumbar and cervical spondylosis, arthritis, fibromyalgia, anxiety, left leg parethesia, last CSI injection 3/9/20      Tests already available:  Available tests,  6-12 months ago , within [facility] . Results have been reviewed.   1/21/20 folate, Vit B12, ferritin, iron, BMP, u/a  1/9/20 TSH, lipid, CMP, CBC  12/30/19 ekg  Normal sinus rhythm with sinus arrhythmia   Normal ECG   When compared with ECG of 17-OCT-2019 16:05,   No significant change was found   Confirmed by Ryne Stahl MD ([id number]) on 12/31/2019 8:49:03 AM     4/24/19 Bilateral carotid US   · There is 50-59% left Internal Carotid Stenosis.  · There is 50-59% right  Internal Carotid Stenosis.    6/19/17 stress echo  1. The EKG portion of this study is negative for ischemia at a peak heart rate of 118 bpm (89% of predicted).   2. Blood pressure remained stable throughout the protocol  (Presenting BP: 190/73 Peak BP: 189/92).   3. The following arrhythmias were present: frequent PACs & PVCs.   4. There were no symptoms of chest discomfort or significant dyspnea throughout the protocol.   CONCLUSIONS     1 - Normal left ventricular systolic function (EF 55-60%).     2 - Normal left ventricular diastolic function.     3 - Normal right ventricular systolic function .   No evidence of stress induced myocardial ischemia.    11/11/2019   MRI right shoulder:   Massive rotator cuff tear involving full-thickness tear supraspinatus and infraspinatus with retraction level of the glenoid, superior migration of the humeral head, diffuse muscular atrophy with secondary degenerative changes glenohumeral joint as above.            Instructions given. (See in Nurse's note)    Optimization:  Anesthesia Preop Clinic Assessment  Indicated.    Medical Opinion Indicated.             Plan:    Testing:  BMP, Hematology Profile, PT/INR and TSH   Pre-anesthesia  visit       Visit focus: concerns in complex and/or prolonged anesthesia, possible regional anesthesia and/or nerve block      Consultation:IM Perioperative Hospitalist ADRI Zarate      Patient  has previously scheduled Medical Appointment: 7/28 HemOn     Navigation: Tests Scheduled.              Consults scheduled.             Results will be tracked by Preop Clinic.    8/6/20 - Dr. Nicol Zarate - ECG reviewed. Patient is cleared for surgery and anesthesia.    8/7/20 - Aubrie Antoine NP,Perioperative Medicine - Patient is optimized for surgery with Dr. Pacheco.

## 2020-07-29 ENCOUNTER — CLINICAL SUPPORT (OUTPATIENT)
Dept: REHABILITATION | Facility: HOSPITAL | Age: 85
End: 2020-07-29
Attending: INTERNAL MEDICINE
Payer: MEDICARE

## 2020-07-29 DIAGNOSIS — M53.86 DECREASED ROM OF LUMBAR SPINE: ICD-10-CM

## 2020-07-29 DIAGNOSIS — M25.612 SHOULDER JOINT STIFFNESS, BILATERAL: ICD-10-CM

## 2020-07-29 DIAGNOSIS — M25.611 SHOULDER JOINT STIFFNESS, BILATERAL: ICD-10-CM

## 2020-07-29 DIAGNOSIS — M25.662 KNEE JOINT STIFFNESS, BILATERAL: ICD-10-CM

## 2020-07-29 DIAGNOSIS — M25.661 KNEE JOINT STIFFNESS, BILATERAL: ICD-10-CM

## 2020-07-29 PROCEDURE — 97110 THERAPEUTIC EXERCISES: CPT | Mod: PO

## 2020-07-29 NOTE — PROGRESS NOTES
Physical Therapy Daily Treatment Note     Name: Gerda Lai  Clinic Number: [id number]    Therapy Diagnosis:   Encounter Diagnoses   Name Primary?    Shoulder joint stiffness, bilateral     Decreased ROM of lumbar spine     Knee joint stiffness, bilateral      Physician: Cesar Burroughs MD    Visit Date: 7/29/2020    Physician Orders: PT Eval and Treat      Medical Diagnosis from Referral:   M47.812 (ICD-10-CM) - Cervical spondylosis   M75.101,M12.811,M12.812,M75.102 (ICD-10-CM) - Rotator cuff tear arthropathy of both shoulders   M17.0 (ICD-10-CM) - Primary osteoarthritis of both knees      Evaluation Date: 6/4/2020  Authorization Period Expiration: 12/31/2020  Plan of Care Expiration: 7/31/2020  Visit # / Visits authorized: (8 total  )      Time In: 9:30 am  Time Out: 10:15 am  Total Billable Time:   45 minutes      Precautions: Standard    Subjective     Pt reports:  Pt reported that she is scheduled to have her L knee replaced on 8/10/2020.  She was compliant with home exercise program.  Response to previous treatment: increased knee pain  Functional change: none    Pain: 2-3 /10  Location: bilateral knees      Objective     BP measurements taken prior to session: NT    Gerda received therapeutic exercises to develop strength, endurance and ROM for 45 minutes including:    SLR- 2 x 10 B   SAQ 10 x 2 B # 3lb weight   Clams 10 x 2 on R & L  Hook lying B hip add 10 x 2 with a ball  Bridges : 2 x 10 reps   Double leg shuttle: 2 x 10 #2 bands   Single leg press with one black band 10 x 1 on R & L  Recumbent stationary bike x 6 minutes    BP taken following session: NT    Home Exercises Provided and Patient Education Provided     Education provided:   - Continue previous HEP    Written Home Exercises Provided: Patient instructed to cont prior HEP.  Exercises were reviewed and Gerda was able to demonstrate them prior to the end of the session.  Gerda demonstrated good  understanding of the education provided.      See EMR under Patient Instructions for exercises provided 6/12/2020.    Assessment     Pt was able to tolerate single leg press on the shuttle with leg press today.  Pt is scheduled for a L TKA on 8/10/2020.  Continue PT Tx 1x/week.       Gerda is progressing well towards her goals.   Pt prognosis is Fair.     Pt will continue to benefit from skilled outpatient physical therapy to address the deficits listed in the problem list box on initial evaluation, provide pt/family education and to maximize pt's level of independence in the home and community environment.   Pt's spiritual, cultural and educational needs considered and pt agreeable to plan of care and goals.    Anticipated barriers to physical therapy: scheduling    Goals:  Short Term Goals: 3 weeks   1. Pt will be instructed in a HEP to address her neck, B shoulder and B knees. - met 7/29/2020  2. Improve R knee flexion to 118 degrees to improve functional ROM        Long Term Goals: 8 weeks   1. Pt will be independent in a HEP to assist in managing her B knee, B shoulder and neck pain  2. Improve R knee flexion to 123 degrees to improve functional ROM  3. Improve R knee extension to 3 degrees from full extension to improve functional ROM    Plan     Continue to progress B LE strengthening as tolerated.     Ebenezer Sanchez, PT      No

## 2020-07-30 ENCOUNTER — OFFICE VISIT (OUTPATIENT)
Dept: HEMATOLOGY/ONCOLOGY | Facility: CLINIC | Age: 85
End: 2020-07-30
Payer: MEDICARE

## 2020-07-30 VITALS
DIASTOLIC BLOOD PRESSURE: 70 MMHG | BODY MASS INDEX: 25.27 KG/M2 | OXYGEN SATURATION: 95 % | HEIGHT: 63 IN | HEART RATE: 72 BPM | SYSTOLIC BLOOD PRESSURE: 162 MMHG | WEIGHT: 142.63 LBS | TEMPERATURE: 98 F | RESPIRATION RATE: 16 BRPM

## 2020-07-30 DIAGNOSIS — Z85.3 PERSONAL HISTORY OF BREAST CANCER: Primary | ICD-10-CM

## 2020-07-30 PROCEDURE — 99213 OFFICE O/P EST LOW 20 MIN: CPT | Mod: S$PBB,,, | Performed by: INTERNAL MEDICINE

## 2020-07-30 PROCEDURE — 99213 PR OFFICE/OUTPT VISIT, EST, LEVL III, 20-29 MIN: ICD-10-PCS | Mod: S$PBB,,, | Performed by: INTERNAL MEDICINE

## 2020-07-30 PROCEDURE — 99999 PR PBB SHADOW E&M-EST. PATIENT-LVL IV: ICD-10-PCS | Mod: PBBFAC,,, | Performed by: INTERNAL MEDICINE

## 2020-07-30 PROCEDURE — 99214 OFFICE O/P EST MOD 30 MIN: CPT | Mod: PBBFAC | Performed by: INTERNAL MEDICINE

## 2020-07-30 PROCEDURE — 99999 PR PBB SHADOW E&M-EST. PATIENT-LVL IV: CPT | Mod: PBBFAC,,, | Performed by: INTERNAL MEDICINE

## 2020-07-30 NOTE — PROGRESS NOTES
Subjective:       Patient ID: Gerda Lai is a 87 y.o. female.    Chief Complaint: No chief complaint on file.    HPI     Mrs. Lai returns today for follow up.   Mrs. Lai has a remote history of breast cancer 28 years ago.  At that time she had been diagnosed with cancer of the right breast. She was treated on an NSABP protocol with lumpectomy, radiation therapy, and tamoxifen. She has remained disease free since then.   A mammogram earlier today was read as BIRADS II, and a one year follow up was recommended.      Review of Systems  Overall she is feeling OK.  She is complaining of her chronic arthritic pain since states that she is scheduled for knee replacement surgery next week.   She denies any anxiety, depression, easy bruising, fevers, chills, night sweats, weight loss, nausea, vomiting, diarrhea, constipation, diplopia, blurred vision, headache, abdominal pain, but she does have mild difficulty ambulating and uses a cane..     Objective:      Physical Exam  GENERAL: She is alert, oriented to time, place, person, pleasant, well nourished, in no acute physical distress.   VITAL SIGNS: Reviewed.   HEENT: Normal. There are no nasal, oral, lip, gingival, auricular, lid, or conjunctival lesions. Mucosae are moist and pink, and there is no thrush. Pupils are equal, reactive to light and accommodation.   Extraocular muscle movements are intact.   NECK: Supple without JVD, adenopathy, or thyromegaly.   LUNGS: Clear to auscultation without wheezing, rales, or rhonchi.   CARDIOVASCULAR: Reveals an S1, S2, no murmurs, no rubs, no gallops.   BREASTS: A lumpectomy scar is seen at the 9 o'clock position on the right breast and at the 2 o'clock position on the left breast. There are no breast masses in the left breast, while there is dense fibrosis underlying her right sided lumpectomy incision.  This has been noted previously and appears unchanged.  LYMPHATIC: There is no cervical, axillary, or supraclavicular  adenopathy.   SKIN: Does not have induration, petechiae, rashes, or ecchymoses.   NEUROLOGIC: Motor function is 5/5, DTRs are 0-1+ bilaterally, symmetrical,   and cranial nerves within normal limits.        Assessment:       1. Malignant neoplasm of nipple of right breast in female, estrogen receptor positive, clinically JEN, doing well.       Plan:        I have asked her to return in a year with another mammogram.  Her questions were answered to her satisfaction.

## 2020-07-31 ENCOUNTER — PATIENT OUTREACH (OUTPATIENT)
Dept: OTHER | Facility: OTHER | Age: 85
End: 2020-07-31

## 2020-07-31 NOTE — PROGRESS NOTES
7/31/20 Left Message - BP avg 166/70 - Gerda Lai submitted a reading of 190/72 at 7/22/2020  7:51 PM. F/u reading decreased to 177/68. Normal range 150-160's. No additional readings since. Hem/Onc 7/30/20 recorded /70. Called patient to check-in.    Hypertension Medications             aliskiren (TEKTURNA) 300 MG Tab Take 1 tablet (300 mg total) by mouth once daily.    doxazosin (CARDURA) 1 MG tablet Take 1 tablet (1 mg total) by mouth once daily.    felodipine (PLENDIL) 2.5 MG Tb24 TAKE ONE TABLET IN THE MORNING AND ONE TABLET IN THE EVENING    labetaloL (NORMODYNE) 100 MG tablet TAKE 2 TABLETS (200 MG TOTAL) BY MOUTH EVERY 12 (TWELVE) HOURS.        Last 5 Patient Entered Readings                                      Current 30 Day Average: 166/70     Recent Readings 7/22/2020 7/22/2020 7/22/2020 7/22/2020 7/14/2020    SBP (mmHg) 177 177 190 190 157    DBP (mmHg) 68 68 72 72 69    Pulse 71 71 68 68 58

## 2020-08-03 ENCOUNTER — TELEPHONE (OUTPATIENT)
Dept: ORTHOPEDICS | Facility: CLINIC | Age: 85
End: 2020-08-03

## 2020-08-03 NOTE — TELEPHONE ENCOUNTER
Returned call to patient. Let her know that she should stop her turmeric and glucosamine in anticipation of upcoming surgery. She will f/u on Wednesday as scheduled or sooner as needed

## 2020-08-03 NOTE — PROGRESS NOTES
"Digital Medicine: Clinician Follow-Up    Gerda Lai submitted a reading of 201/79 at 8/1/2020  5:24 PM. Called patient for hypertension follow-up due to high BP alert. BP avg 175/72.    The history is provided by the patient.   Follow-up reason(s): routine follow up.   Care Team received high BP alert.  Gerda Lai submitted a reading of 201/79 at 8/1/2020  5:24 PM. Patient denies any signs or symptoms of hypertension with this elevated reading.     Additional Follow-up details: - When asked what caused the elevated BP reading recorded on Saturday, patient states "I think there's something going on with this machine and I always forget to charge it. I'm not quite sure but I took that reading after I ate. Well anyway, I plugged it up to charge it and will re-measure at some point later on today." She states she is still taking all of her BP medications as prescribed. However, she expressed concern that her doxazosin prescription from [facility] states that she is to take 2 tablets daily but she understands that she is only taking one tablet daily, every evening after we changed it. Confirmed doxazosin 1 mg is prescribed as one tablet (1 mg) daily. She has knee replacement surgery scheduled for Monday next week. She attributes her uncontrolled readings to pain associated with her knee and various joints. "I wake up every morning in pain where my knees just pain She feels that things will improve once her knee surgery is completed.       Last 5 Patient Entered Readings                                      Current 30 Day Average: 175/72     Recent Readings 8/1/2020 8/1/2020 7/22/2020 7/22/2020 7/22/2020    SBP (mmHg) 201 201 177 177 190    DBP (mmHg) 79 79 68 68 72    Pulse 69 69 71 71 68               Depression Screening  Did not address depression screening.    Sleep Apnea Screening    Did not address sleep apnea screening.     Medication Affordability Screening  Did not address medication affordability screening. "     Medication Adherence-Medication adherence was assessed.          ASSESSMENT(S)  Patients BP average is 175/72 mmHg, which is above goal. Patient's BP goal is less than or equal to 140/90 per 2017 ACC/AHA Hypertension Guidelines.   - DBP continues to be well-controlled. SBP is trending upwards, uncontrolled 2/2 pain. Patient states that device needs to be charged.      PLAN  Continue current therapy:  Continue current diet/physical activity routine:  Instructed to charge device: Re-measure BP after device charges, before she eats lunch.     Patient verbalizes understanding. Patient did not express questions or concerns and patient has contact information if needed.          There are no preventive care reminders to display for this patient.      Hypertension Medications             aliskiren (TEKTURNA) 300 MG Tab Take 1 tablet (300 mg total) by mouth once daily.    doxazosin (CARDURA) 1 MG tablet Take 1 tablet (1 mg total) by mouth once daily.    felodipine (PLENDIL) 2.5 MG Tb24 TAKE ONE TABLET IN THE MORNING AND ONE TABLET IN THE EVENING    labetaloL (NORMODYNE) 100 MG tablet TAKE 2 TABLETS (200 MG TOTAL) BY MOUTH EVERY 12 (TWELVE) HOURS.

## 2020-08-03 NOTE — TELEPHONE ENCOUNTER
----- Message from Jennifer Henley MA sent at 8/3/2020  9:32 AM CDT -----  Regarding: Patient medication questions  Good morning,  You are seeing this patient Gerda Lai, MRN: [id number] for her preop appointment Wednesday. Could you please call her to discuss her medication questions.  Thanks,  Jennifer   ----- Message -----  From: Eyal Quintero  Sent: 8/3/2020   8:25 AM CDT  To: Tara RANKIN Staff    Pt states someone called her last week sometime regarding her medications and what she should/should not be taking prior to her surgery. Please call back to discuss.    Contact Info [phone] (bycl)

## 2020-08-05 ENCOUNTER — HOSPITAL ENCOUNTER (OUTPATIENT)
Dept: RADIOLOGY | Facility: HOSPITAL | Age: 85
Discharge: HOME | End: 2020-08-05
Attending: NURSE PRACTITIONER
Payer: MEDICARE

## 2020-08-05 ENCOUNTER — OFFICE VISIT (OUTPATIENT)
Dept: ORTHOPEDICS | Facility: CLINIC | Age: 85
End: 2020-08-05
Payer: MEDICARE

## 2020-08-05 DIAGNOSIS — M25.562 LEFT KNEE PAIN, UNSPECIFIED CHRONICITY: ICD-10-CM

## 2020-08-05 DIAGNOSIS — M17.12 PRIMARY OSTEOARTHRITIS OF LEFT KNEE: ICD-10-CM

## 2020-08-05 DIAGNOSIS — M25.562 LEFT KNEE PAIN, UNSPECIFIED CHRONICITY: Primary | ICD-10-CM

## 2020-08-05 PROCEDURE — 73560 X-RAY EXAM OF KNEE 1 OR 2: CPT | Mod: 26,LT,, | Performed by: RADIOLOGY

## 2020-08-05 PROCEDURE — 73560 X-RAY EXAM OF KNEE 1 OR 2: CPT | Mod: TC,LT

## 2020-08-05 PROCEDURE — 99499 UNLISTED E&M SERVICE: CPT | Mod: S$PBB,,, | Performed by: NURSE PRACTITIONER

## 2020-08-05 PROCEDURE — 99499 NO LOS: ICD-10-PCS | Mod: S$PBB,,, | Performed by: NURSE PRACTITIONER

## 2020-08-05 PROCEDURE — 99213 OFFICE O/P EST LOW 20 MIN: CPT | Mod: PBBFAC,25 | Performed by: NURSE PRACTITIONER

## 2020-08-05 PROCEDURE — 99999 PR PBB SHADOW E&M-EST. PATIENT-LVL III: CPT | Mod: PBBFAC,,, | Performed by: NURSE PRACTITIONER

## 2020-08-05 PROCEDURE — 73560 XR KNEE 1 OR 2 VIEW LEFT: ICD-10-PCS | Mod: 26,LT,, | Performed by: RADIOLOGY

## 2020-08-05 PROCEDURE — 99999 PR PBB SHADOW E&M-EST. PATIENT-LVL III: ICD-10-PCS | Mod: PBBFAC,,, | Performed by: NURSE PRACTITIONER

## 2020-08-05 NOTE — TELEPHONE ENCOUNTER
Per Dr. Camarena: Pt needs an ECG. Pl also ask pt is she has been having any chest discomfort or shortness of breath? I placed the order for the ECG    Called patient to arrange EKG.  Left message to return phone call.

## 2020-08-05 NOTE — TELEPHONE ENCOUNTER
I am sorry for not writing sooner. Pt needs an ECG in the next 1-2 days. Pl also ask pt is she has been having any chest discomfort or shortness of breath?

## 2020-08-06 ENCOUNTER — TELEPHONE (OUTPATIENT)
Dept: PREADMISSION TESTING | Facility: HOSPITAL | Age: 85
End: 2020-08-06

## 2020-08-06 ENCOUNTER — ANESTHESIA EVENT (OUTPATIENT)
Dept: SURGERY | Facility: HOSPITAL | Age: 85
End: 2020-08-06
Payer: MEDICARE

## 2020-08-06 ENCOUNTER — CLINICAL SUPPORT (OUTPATIENT)
Dept: CARDIOLOGY | Facility: CLINIC | Age: 85
End: 2020-08-06
Payer: MEDICARE

## 2020-08-06 DIAGNOSIS — I10 HYPERTENSION, ESSENTIAL: Chronic | ICD-10-CM

## 2020-08-06 PROBLEM — M81.0 OSTEOPOROSIS: Status: ACTIVE | Noted: 2020-08-06

## 2020-08-06 PROBLEM — D64.9 ANEMIA: Status: ACTIVE | Noted: 2020-08-06

## 2020-08-06 PROCEDURE — 93010 EKG 12-LEAD: ICD-10-PCS | Mod: S$PBB,,, | Performed by: INTERNAL MEDICINE

## 2020-08-06 PROCEDURE — 93005 ELECTROCARDIOGRAM TRACING: CPT | Mod: PBBFAC,PO | Performed by: INTERNAL MEDICINE

## 2020-08-06 PROCEDURE — 93010 ELECTROCARDIOGRAM REPORT: CPT | Mod: S$PBB,,, | Performed by: INTERNAL MEDICINE

## 2020-08-06 RX ORDER — PREGABALIN 25 MG/1
75 CAPSULE ORAL NIGHTLY
Status: CANCELLED | OUTPATIENT
Start: 2020-08-06

## 2020-08-06 RX ORDER — BISACODYL 10 MG
10 SUPPOSITORY, RECTAL RECTAL EVERY 12 HOURS PRN
Status: CANCELLED | OUTPATIENT
Start: 2020-08-06

## 2020-08-06 RX ORDER — MUPIROCIN 20 MG/G
1 OINTMENT TOPICAL
Status: CANCELLED | OUTPATIENT
Start: 2020-08-06

## 2020-08-06 RX ORDER — AMOXICILLIN 250 MG
1 CAPSULE ORAL 2 TIMES DAILY
Status: CANCELLED | OUTPATIENT
Start: 2020-08-06

## 2020-08-06 RX ORDER — NALOXONE HCL 0.4 MG/ML
0.02 VIAL (ML) INJECTION
Status: CANCELLED | OUTPATIENT
Start: 2020-08-06

## 2020-08-06 RX ORDER — FAMOTIDINE 20 MG/1
20 TABLET, FILM COATED ORAL 2 TIMES DAILY
Status: CANCELLED | OUTPATIENT
Start: 2020-08-06

## 2020-08-06 RX ORDER — SODIUM CHLORIDE 0.9 % (FLUSH) 0.9 %
10 SYRINGE (ML) INJECTION
Status: CANCELLED | OUTPATIENT
Start: 2020-08-06

## 2020-08-06 RX ORDER — MUPIROCIN 20 MG/G
1 OINTMENT TOPICAL 2 TIMES DAILY
Status: CANCELLED | OUTPATIENT
Start: 2020-08-06 | End: 2020-08-11

## 2020-08-06 RX ORDER — SODIUM CHLORIDE 9 MG/ML
INJECTION, SOLUTION INTRAVENOUS CONTINUOUS
Status: CANCELLED | OUTPATIENT
Start: 2020-08-06 | End: 2020-08-07

## 2020-08-06 RX ORDER — FENTANYL CITRATE 50 UG/ML
25 INJECTION, SOLUTION INTRAMUSCULAR; INTRAVENOUS EVERY 5 MIN PRN
Status: CANCELLED | OUTPATIENT
Start: 2020-08-06

## 2020-08-06 RX ORDER — PREGABALIN 25 MG/1
75 CAPSULE ORAL
Status: CANCELLED | OUTPATIENT
Start: 2020-08-06

## 2020-08-06 RX ORDER — OXYCODONE HYDROCHLORIDE 5 MG/1
10 TABLET ORAL
Status: CANCELLED | OUTPATIENT
Start: 2020-08-06

## 2020-08-06 RX ORDER — POLYETHYLENE GLYCOL 3350 17 G/17G
17 POWDER, FOR SOLUTION ORAL DAILY
Status: CANCELLED | OUTPATIENT
Start: 2020-08-06

## 2020-08-06 RX ORDER — TALC
6 POWDER (GRAM) TOPICAL NIGHTLY PRN
Status: CANCELLED | OUTPATIENT
Start: 2020-08-06

## 2020-08-06 RX ORDER — ONDANSETRON 2 MG/ML
4 INJECTION INTRAMUSCULAR; INTRAVENOUS EVERY 8 HOURS PRN
Status: CANCELLED | OUTPATIENT
Start: 2020-08-06

## 2020-08-06 RX ORDER — ACETAMINOPHEN 500 MG
1000 TABLET ORAL EVERY 6 HOURS
Status: CANCELLED | OUTPATIENT
Start: 2020-08-06 | End: 2020-08-08

## 2020-08-06 RX ORDER — LIDOCAINE HYDROCHLORIDE 10 MG/ML
1 INJECTION, SOLUTION EPIDURAL; INFILTRATION; INTRACAUDAL; PERINEURAL
Status: CANCELLED | OUTPATIENT
Start: 2020-08-06

## 2020-08-06 RX ORDER — CELECOXIB 100 MG/1
400 CAPSULE ORAL
Status: CANCELLED | OUTPATIENT
Start: 2020-08-06

## 2020-08-06 RX ORDER — CELECOXIB 100 MG/1
200 CAPSULE ORAL DAILY
Status: CANCELLED | OUTPATIENT
Start: 2020-08-06

## 2020-08-06 RX ORDER — ROPIVACAINE HYDROCHLORIDE 2 MG/ML
8 INJECTION, SOLUTION EPIDURAL; INFILTRATION; PERINEURAL CONTINUOUS
Status: CANCELLED | OUTPATIENT
Start: 2020-08-06

## 2020-08-06 RX ORDER — MORPHINE SULFATE 10 MG/ML
2 INJECTION, SOLUTION INTRAMUSCULAR; INTRAVENOUS
Status: CANCELLED | OUTPATIENT
Start: 2020-08-06

## 2020-08-06 RX ORDER — OXYCODONE HYDROCHLORIDE 5 MG/1
5 TABLET ORAL
Status: CANCELLED | OUTPATIENT
Start: 2020-08-06

## 2020-08-06 RX ORDER — SODIUM CHLORIDE 9 MG/ML
INJECTION, SOLUTION INTRAVENOUS
Status: CANCELLED | OUTPATIENT
Start: 2020-08-06

## 2020-08-06 RX ORDER — ACETAMINOPHEN 500 MG
1000 TABLET ORAL
Status: CANCELLED | OUTPATIENT
Start: 2020-08-06

## 2020-08-06 RX ORDER — MIDAZOLAM HYDROCHLORIDE 1 MG/ML
1 INJECTION INTRAMUSCULAR; INTRAVENOUS EVERY 5 MIN PRN
Status: CANCELLED | OUTPATIENT
Start: 2020-08-06

## 2020-08-06 RX ORDER — ASPIRIN 81 MG/1
81 TABLET ORAL 2 TIMES DAILY
Status: CANCELLED | OUTPATIENT
Start: 2020-08-06

## 2020-08-06 NOTE — TELEPHONE ENCOUNTER
Pt notified, will come in later today for EKG. Pt denies any chest discomfort or shortness of breath.

## 2020-08-06 NOTE — PROGRESS NOTES
Gerda Lai is a 87 y.o. year old here today for a pre-operative visit in preparation for a Left total knee arthroplasty to be performed by Dr. Pacheco  on 8/10/2020.  she was last seen and treated in the clinic on 7/1/2020. she will be medically optimized by the pre op center. There has been no significant change in medical status since last visit. No fever, chills, malaise, or unexplained weight change.      Allergies, Medications, past medical and surgical history reviewed.    Focused examination performed.    Patient saw surgeon in clinic today. All questions answered. Patient encouraged to call with questions. Contact information given.     Pre, janel, and post operative procedures and expectations discussed. Questions were answered. Gerda Lai has been educated and is ready to proceed with surgery. Approximately 30 minutes was spent discussing surgical outcomes, plans, procedures pre, janel, and post operative expections and care.  Surgical consent signed.    Gerda Lai will contact us if there are any questions, concerns, or changes in medical status prior to surgery.       Joint class done today in [facility] room    COVID-19 test date: 8/7/2020     patient will be scheduled with Home Health during hospitalization. This was discussed with Dr. Pacheco in clinic today.

## 2020-08-06 NOTE — H&P
CC: Left knee pain    Gerda Lai is a 87 y.o. female with history of Left knee pain. Pain is worse with activity and weight bearing.  Patient has experienced interference of activities of daily living due to decreased range of motion and an increase in joint pain and swelling.  Patient has failed non-operative treatment including NSAIDs, corticosteroid injections, viscosupplement injections, and activity modification.  Gerda Lai currently ambulates using assistive device .     Relevant medical conditions of significance in perioperative period:  HTN- on medication managed by pcp  Hyperlipidemia- on medication managed by pcp    Past Medical History:   Diagnosis Date    Arthritis     Basal cell carcinoma 09/2016    right post auricular neck     Breast cancer 1992    right    Cataract     Fibromyalgia     History of measles as a child     Hyperlipidemia     Hypertension     Personal history of colonic polyps     Pneumonia     SCC (squamous cell carcinoma) 2015    R chest    SCC (squamous cell carcinoma) 2016    left medial shoulder    SCC (squamous cell carcinoma) 2017    left knee    Shingles     Squamous cell carcinoma 2015    right forearm    Thyroid disease     Vaginitis        Past Surgical History:   Procedure Laterality Date    ADENOIDECTOMY      BREAST BIOPSY Left     Excisional bx, benign    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    CATARACT EXTRACTION W/  INTRAOCULAR LENS IMPLANT Bilateral     EYE SURGERY      JOINT REPLACEMENT Right     ELZBIETA    thyriodectomy      partial    tonsillectomy      TONSILLECTOMY      TOTAL HIP ARTHROPLASTY      right    Yag  Left        Family History   Problem Relation Age of Onset    Breast cancer Mother     Cancer Mother     Stroke Paternal Grandfather     Heart disease Father     Cancer Paternal Aunt     Heart disease Paternal Aunt     Glaucoma Paternal Grandmother     Amblyopia Neg Hx     Blindness Neg Hx     Cataracts Neg Hx      Diabetes Neg Hx     Hypertension Neg Hx     Macular degeneration Neg Hx     Retinal detachment Neg Hx     Strabismus Neg Hx     Thyroid disease Neg Hx     Melanoma Neg Hx        Review of patient's allergies indicates:   Allergen Reactions    Sulfa (sulfonamide antibiotics) Rash    Clarithromycin Other (See Comments)     Weak, extreme fatigue. dizziness    Flexeril [cyclobenzaprine] Other (See Comments)     Dizziness    Iodine and iodide containing products     Lisinopril Other (See Comments)     Cough and sensation of throat swelling/?angioedema    Losartan Rash    Metoprolol Swelling     Tightness in throat    Tramadol Other (See Comments)     Dizzy and weak    Verapamil (bulk) Palpitations    Voltaren [diclofenac sodium] Other (See Comments)     Drops blood pressure         Current Outpatient Medications:     acetaminophen (TYLENOL) 650 MG TbSR, Take 650 mg by mouth as needed (pain). , Disp: , Rfl:     albuterol (PROVENTIL/VENTOLIN HFA) 90 mcg/actuation inhaler, Inhale 2 puffs into the lungs every 6 (six) hours as needed for Wheezing. Rescue, Disp: 1 Inhaler, Rfl: 0    alendronate (FOSAMAX) 35 MG tablet, Take 1 tablet (35 mg total) by mouth every 7 days. (Patient not taking: Reported on 7/17/2020), Disp: 4 tablet, Rfl: 11    aliskiren (TEKTURNA) 300 MG Tab, Take 1 tablet (300 mg total) by mouth once daily., Disp: 90 tablet, Rfl: 3    B INFANTIS/B ANI/B JOSE/B BIFID (PROBIOTIC 4X ORAL), Take 1 tablet by mouth daily, Disp: , Rfl:     coenzyme Q10 (CO Q-10) 100 mg capsule, Take 100 mg by mouth once daily., Disp: , Rfl:     doxazosin (CARDURA) 1 MG tablet, Take 1 tablet (1 mg total) by mouth once daily., Disp: 90 tablet, Rfl: 3    felodipine (PLENDIL) 2.5 MG Tb24, TAKE ONE TABLET IN THE MORNING AND ONE TABLET IN THE EVENING, Disp: 180 tablet, Rfl: 3    glucosamine-chondroitin 500-400 mg tablet, Take 1 tablet by mouth once daily. , Disp: , Rfl:     ipratropium (ATROVENT) 0.03 % nasal spray, 2  sprays by Nasal route 2 (two) times daily as needed. , Disp: , Rfl:     labetaloL (NORMODYNE) 100 MG tablet, TAKE 2 TABLETS (200 MG TOTAL) BY MOUTH EVERY 12 (TWELVE) HOURS. (Patient taking differently: TAKE 2 TABLETS (200 MG TOTAL) BY MOUTH EVERY 12 (TWELVE) HOURS. Pt reports taking 100 mg BID), Disp: 360 tablet, Rfl: 3    LORazepam (ATIVAN) 0.5 MG tablet, Take 0.5-1 tablets (0.25-0.5 mg total) by mouth every 12 (twelve) hours as needed for Anxiety., Disp: 30 tablet, Rfl: 0    multivitamin capsule, Take 1 capsule by mouth once daily., Disp: , Rfl:     omeprazole (PRILOSEC OTC) 20 MG tablet, Take 20 mg by mouth once daily.  , Disp: , Rfl:     polymyxin B sulf-trimethoprim (POLYTRIM) 10,000 unit- 1 mg/mL Drop, Place 1 drop into both eyes every 6 (six) hours., Disp: 10 mL, Rfl: 0    pravastatin (PRAVACHOL) 40 MG tablet, Take 1 tablet (40 mg total) by mouth once daily., Disp: 90 tablet, Rfl: 3    TURMERIC ORAL, Take 500 mg by mouth once daily. , Disp: , Rfl:     Review of Systems:  Constitutional: no fever or chills  Eyes: no visual changes  ENT: no nasal congestion or sore throat  Respiratory: no cough or shortness of breath  Cardiovascular: no chest pain or palpitations  Gastrointestinal: no nausea or vomiting, tolerating diet  Genitourinary: no hematuria or dysuria  Integument/Breast: no rash or pruritis  Hematologic/Lymphatic: no easy bruising or lymphadenopathy  Musculoskeletal: positive for knee pain  Neurological: no seizures or tremors  Behavioral/Psych: no auditory or visual hallucinations  Endocrine: no heat or cold intolerance    PE:  There were no vitals taken for this visit.  General: Pleasant, cooperative, NAD   Gait: antalgic  HEENT: NCAT, sclera nonicteric   Lungs: Respirations clear bilaterally; equal and unlabored.   CV: S1S2; 2+ bilateral upper and lower extremity pulses.   Skin: Intact throughout with no rashes, erythema, or lesions  Extremities: No LE edema,  no erythema or warmth of the skin  in either lower extremity.    Left knee exam:  Knee Range of Motion:limited by pain, pain with passive range of motion  Effusion:none  Condition of skin:intact  Location of tenderness:lateral joint line   Strength:normal  Stability: stable to testing    Hip Examination: painless PROM of hip     Radiographs: Radiographs reveal advanced degenerative changes including subchondral cyst formation, subchondral sclerosis, osteophyte formation, joint space narrowing.     Knee Alignment: normal    Diagnosis: osteoarthritis Left knee    Plan: Left total knee arthroplasty    Due to the serious nature of total joint infection and the high prevalence of community acquired MRSA, vancomycin will be used perioperatively.

## 2020-08-06 NOTE — ASSESSMENT & PLAN NOTE
pravastatin (PRAVACHOL) 40 MG tablet  Encouraged weight loss, healthy diet (DASH/Mediterranean) and exercise. Patient should exercise 30 minutes at least five times weekly. Limit alcohol.

## 2020-08-06 NOTE — ASSESSMENT & PLAN NOTE
Current labs:      RBC 3.45  Hgb 11.3  HCT 34.7      Reports fatigue, feels worn out while doing housework.   Denies Vitamin B12 or folate deficiency.

## 2020-08-06 NOTE — ASSESSMENT & PLAN NOTE
glucosamine-chondroitin 500-400 mg tablet  acetaminophen (TYLENOL) 650 MG TbSR   TURMERIC ORAL

## 2020-08-06 NOTE — ASSESSMENT & PLAN NOTE
Treated with Lorazepam 0.5 mg ;controlled symptoms. Denies suicidal/ homicidal ideations. Encouraged regular exercise and healthy diet; appropriate sleep. Limit caffeine intake. Instructed to keep regular follow-up appts   none

## 2020-08-06 NOTE — ASSESSMENT & PLAN NOTE
Current /61  today.   Followed by Digital Medicine and Cardiology; cleared per cardiology (Nicol) for surgery 8/6/20.  Encouraged keeping a healthy weight and BMI  Education was provided regarding lifestyle changes to reduce systolic BP;  Smoking cessation; Exercise 30 minutes per day,  5 days per week or 150 minutes weekly;  Sodium reduction and avoidance of high salt foods such as processed meats, frozen meals, fast foods.     labetaloL (NORMODYNE) 100 MG tablet  doxazosin (CARDURA) 1 MG tablet  felodipine (PLENDIL) 2.5 MG Tb24  aliskiren (TEKTURNA) 300 MG Tab

## 2020-08-06 NOTE — ASSESSMENT & PLAN NOTE
BUN =22 Cr =1  GFR =50.8  Stages of CKD discussed  Reports/Denies nausea/vomiting, hematuria, or edema. Reports fatigue.  No changes in urine volume. Most recent Encouraged to avoid NSAIDs, reduce salt intake, and exercise.

## 2020-08-07 ENCOUNTER — LAB VISIT (OUTPATIENT)
Dept: SPORTS MEDICINE | Facility: CLINIC | Age: 85
End: 2020-08-07
Payer: MEDICARE

## 2020-08-07 ENCOUNTER — TELEPHONE (OUTPATIENT)
Dept: ORTHOPEDICS | Facility: CLINIC | Age: 85
End: 2020-08-07

## 2020-08-07 ENCOUNTER — INITIAL CONSULT (OUTPATIENT)
Dept: INTERNAL MEDICINE | Facility: CLINIC | Age: 85
End: 2020-08-07
Payer: MEDICARE

## 2020-08-07 VITALS
WEIGHT: 141 LBS | BODY MASS INDEX: 24.98 KG/M2 | RESPIRATION RATE: 16 BRPM | SYSTOLIC BLOOD PRESSURE: 135 MMHG | DIASTOLIC BLOOD PRESSURE: 61 MMHG | HEART RATE: 64 BPM | OXYGEN SATURATION: 96 % | HEIGHT: 63 IN | TEMPERATURE: 98 F

## 2020-08-07 DIAGNOSIS — I77.9 BILATERAL CAROTID ARTERY DISEASE, UNSPECIFIED TYPE: ICD-10-CM

## 2020-08-07 DIAGNOSIS — M25.662 KNEE JOINT STIFFNESS, BILATERAL: ICD-10-CM

## 2020-08-07 DIAGNOSIS — M25.661 KNEE JOINT STIFFNESS, BILATERAL: ICD-10-CM

## 2020-08-07 DIAGNOSIS — Z96.652 STATUS POST TOTAL LEFT KNEE REPLACEMENT: Primary | ICD-10-CM

## 2020-08-07 DIAGNOSIS — M47.812 CERVICAL SPONDYLOSIS: ICD-10-CM

## 2020-08-07 DIAGNOSIS — Z85.3 PERSONAL HISTORY OF BREAST CANCER: ICD-10-CM

## 2020-08-07 DIAGNOSIS — I77.1 SUBCLAVIAN ARTERY STENOSIS, RIGHT: ICD-10-CM

## 2020-08-07 DIAGNOSIS — M54.17 LUMBOSACRAL RADICULOPATHY AT L5: ICD-10-CM

## 2020-08-07 DIAGNOSIS — M85.80 OSTEOPENIA, UNSPECIFIED LOCATION: ICD-10-CM

## 2020-08-07 DIAGNOSIS — M81.0 OSTEOPOROSIS, UNSPECIFIED OSTEOPOROSIS TYPE, UNSPECIFIED PATHOLOGICAL FRACTURE PRESENCE: ICD-10-CM

## 2020-08-07 DIAGNOSIS — I10 HYPERTENSION, ESSENTIAL: Chronic | ICD-10-CM

## 2020-08-07 DIAGNOSIS — I70.0 AORTIC ATHEROSCLEROSIS: Chronic | ICD-10-CM

## 2020-08-07 DIAGNOSIS — M20.41 ACQUIRED HAMMER TOE OF RIGHT FOOT: Primary | ICD-10-CM

## 2020-08-07 DIAGNOSIS — E78.5 HYPERLIPIDEMIA, UNSPECIFIED HYPERLIPIDEMIA TYPE: Chronic | ICD-10-CM

## 2020-08-07 DIAGNOSIS — F41.9 ANXIETY: Chronic | ICD-10-CM

## 2020-08-07 DIAGNOSIS — G89.29 CHRONIC PAIN OF LEFT KNEE: ICD-10-CM

## 2020-08-07 DIAGNOSIS — R79.9 ABNORMAL BLOOD CHEMISTRY TEST: ICD-10-CM

## 2020-08-07 DIAGNOSIS — Z01.818 PRE-OP TESTING: ICD-10-CM

## 2020-08-07 DIAGNOSIS — M25.562 CHRONIC PAIN OF LEFT KNEE: ICD-10-CM

## 2020-08-07 DIAGNOSIS — D53.9 MACROCYTIC ANEMIA: ICD-10-CM

## 2020-08-07 DIAGNOSIS — H90.5 SENSORINEURAL HEARING LOSS (SNHL), UNSPECIFIED LATERALITY: ICD-10-CM

## 2020-08-07 DIAGNOSIS — N18.30 CKD (CHRONIC KIDNEY DISEASE), STAGE III: Chronic | ICD-10-CM

## 2020-08-07 PROCEDURE — 99214 PR OFFICE/OUTPT VISIT, EST, LEVL IV, 30-39 MIN: ICD-10-PCS | Mod: S$PBB,,, | Performed by: NURSE PRACTITIONER

## 2020-08-07 PROCEDURE — U0003 INFECTIOUS AGENT DETECTION BY NUCLEIC ACID (DNA OR RNA); SEVERE ACUTE RESPIRATORY SYNDROME CORONAVIRUS 2 (SARS-COV-2) (CORONAVIRUS DISEASE [COVID-19]), AMPLIFIED PROBE TECHNIQUE, MAKING USE OF HIGH THROUGHPUT TECHNOLOGIES AS DESCRIBED BY CMS-2020-01-R: HCPCS

## 2020-08-07 PROCEDURE — 99214 OFFICE O/P EST MOD 30 MIN: CPT | Mod: PBBFAC | Performed by: NURSE PRACTITIONER

## 2020-08-07 PROCEDURE — 99999 PR PBB SHADOW E&M-EST. PATIENT-LVL IV: ICD-10-PCS | Mod: PBBFAC,,, | Performed by: NURSE PRACTITIONER

## 2020-08-07 PROCEDURE — 99999 PR PBB SHADOW E&M-EST. PATIENT-LVL IV: CPT | Mod: PBBFAC,,, | Performed by: NURSE PRACTITIONER

## 2020-08-07 PROCEDURE — 99214 OFFICE O/P EST MOD 30 MIN: CPT | Mod: S$PBB,,, | Performed by: NURSE PRACTITIONER

## 2020-08-07 RX ORDER — OXYCODONE HYDROCHLORIDE 5 MG/1
TABLET ORAL
Qty: 30 TABLET | Refills: 0 | Status: SHIPPED | OUTPATIENT
Start: 2020-08-07 | End: 2020-08-24 | Stop reason: ALTCHOICE

## 2020-08-07 RX ORDER — DOCUSATE SODIUM 100 MG/1
100 CAPSULE, LIQUID FILLED ORAL 2 TIMES DAILY PRN
Qty: 60 CAPSULE | Refills: 0 | Status: SHIPPED | OUTPATIENT
Start: 2020-08-07 | End: 2020-09-29

## 2020-08-07 RX ORDER — MELOXICAM 15 MG/1
15 TABLET ORAL DAILY
Qty: 30 TABLET | Refills: 0 | Status: ON HOLD | OUTPATIENT
Start: 2020-08-07 | End: 2020-08-10 | Stop reason: HOSPADM

## 2020-08-07 RX ORDER — ASPIRIN 81 MG/1
81 TABLET ORAL 2 TIMES DAILY
Qty: 60 TABLET | Refills: 0 | Status: SHIPPED | OUTPATIENT
Start: 2020-08-07 | End: 2020-09-29

## 2020-08-07 RX ORDER — DEXTROMETHORPHAN HYDROBROMIDE, GUAIFENESIN 5; 100 MG/5ML; MG/5ML
650 LIQUID ORAL EVERY 8 HOURS PRN
Qty: 120 TABLET | Refills: 0 | Status: SHIPPED | OUTPATIENT
Start: 2020-08-07 | End: 2023-05-20

## 2020-08-07 NOTE — ASSESSMENT & PLAN NOTE
Dx'd 28 years ago- left breast. Followed per Hem-Onc; last note 7/30/20. Treated with lumpectomy, radiation, and Tamoxifen.

## 2020-08-07 NOTE — OUTPATIENT SUBJECTIVE & OBJECTIVE
Outpatient Subjective & Objective      Chief Complaint: Preoperative evaulation, perioperative medical management, and complication reduction plan.     Functional Capacity:  stretching exercises daily: 45-one hour; denies CP/SOB. Will get some fatigue with housework and prolonged walking.       Anesthesia issues: None  Difficulty mouth opening: No  Steroid use in the last 12 months:  Yes    Family anesthesia difficulty: None       Active Cardiac Conditions: None    Revised Cardiac Risk Index Predictors: None     Family Hx of Thrombosis: None    Past Medical History Pertinent Negatives:   Diagnosis Date Noted    Amblyopia 09/14/2012    Asthma 08/07/2020    Cataract 09/14/2012    COPD (chronic obstructive pulmonary disease) 08/07/2020    Coronary artery disease 08/07/2020    Deep vein thrombosis 08/07/2020    Diabetes mellitus 09/14/2012    Diabetes mellitus, type 2 08/07/2020    Diabetic retinopathy 09/14/2012    Diabetic retinopathy 02/05/2014    Glaucoma 09/14/2012    Glaucoma 02/05/2014    Macular degeneration 09/14/2012    Myocardial infarction 08/07/2020    Pulmonary embolism 08/07/2020    Retinal detachment 09/14/2012    Seizures 08/07/2020    Strabismus 09/14/2012    Stroke 08/07/2020    Uveitis 09/14/2012       Past Surgical History:   Procedure Laterality Date    ADENOIDECTOMY      BREAST BIOPSY Left     Excisional bx, benign    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    CATARACT EXTRACTION W/  INTRAOCULAR LENS IMPLANT Bilateral     EYE SURGERY      JOINT REPLACEMENT Right     ELZBIETA    thyriodectomy      partial    tonsillectomy      TONSILLECTOMY      TOTAL HIP ARTHROPLASTY      right    Yag  Left        Review of Systems   Constitutional: Positive for fatigue. Negative for chills, fever and unexpected weight change.   HENT: Positive for hearing loss and postnasal drip. Negative for dental problem, rhinorrhea, sore throat, tinnitus and trouble swallowing.    Eyes: Negative for  "photophobia, pain, discharge and visual disturbance.   Respiratory: Negative for apnea, cough, chest tightness, shortness of breath and wheezing.         STOP BANG risk factors:  fatigue  HTN     Cardiovascular: Positive for leg swelling (attributes to felodipine). Negative for chest pain and palpitations.   Gastrointestinal: Negative for abdominal pain, blood in stool, constipation, nausea and vomiting.        Denies Fatty liver, Hepatitis   Endocrine: Negative for cold intolerance, heat intolerance, polydipsia, polyphagia and polyuria.   Genitourinary: Negative for decreased urine volume, difficulty urinating, dysuria, frequency, hematuria and urgency.        Nocturia   Musculoskeletal: Positive for back pain and neck pain. Negative for arthralgias and neck stiffness.   Skin: Negative for rash and wound.   Allergic/Immunologic: Negative for immunocompromised state.   Neurological: Positive for numbness (right hand- attributes to bilateral shoulder pain). Negative for dizziness, tremors, seizures, syncope, weakness and headaches.   Hematological: Negative for adenopathy. Does not bruise/bleed easily.   Psychiatric/Behavioral: Negative for confusion, hallucinations, sleep disturbance and suicidal ideas.              VITALS    Vitals - 1 value per visit 8/7/2020   SYSTOLIC 135   DIASTOLIC 61   PULSE 64   TEMPERATURE 97.9   RESPIRATIONS 16   SPO2 96   Weight (lb) 141   Weight (kg) 63.957   HEIGHT 5' 3"   BODY MASS INDEX 24.98       Physical Exam  Vitals signs reviewed.   Constitutional:       General: She is not in acute distress.     Appearance: She is well-developed.   HENT:      Head: Normocephalic.      Nose: Nose normal.      Mouth/Throat:      Pharynx: No oropharyngeal exudate.   Eyes:      General:         Right eye: No discharge.         Left eye: No discharge.      Conjunctiva/sclera: Conjunctivae normal.      Pupils: Pupils are equal, round, and reactive to light.   Neck:      Musculoskeletal: Normal range " of motion. Pain with movement (right lateral) present.      Thyroid: No thyromegaly.      Vascular: No carotid bruit or JVD.      Trachea: No tracheal deviation.   Cardiovascular:      Rate and Rhythm: Normal rate and regular rhythm.      Pulses:           Carotid pulses are 2+ on the right side and 2+ on the left side.     Heart sounds: Normal heart sounds. No murmur.      Comments: Support socks and regular socks on Bilateral lower leg with boots; unable to assess DP/PT pulses.   Pulmonary:      Effort: Pulmonary effort is normal. No respiratory distress.      Breath sounds: Normal breath sounds. No stridor. No wheezing, rhonchi or rales.   Abdominal:      General: Bowel sounds are normal. There is no distension.      Palpations: Abdomen is soft.      Tenderness: There is no guarding.   Lymphadenopathy:      Cervical: No cervical adenopathy.   Skin:     General: Skin is warm and dry.      Capillary Refill: Capillary refill takes less than 2 seconds.      Findings: No erythema or rash.   Neurological:      Mental Status: She is alert and oriented to person, place, and time.      Coordination: Coordination normal.          Significant Labs:  Lab Results   Component Value Date    WBC 8.06 08/05/2020    HGB 11.3 (L) 08/05/2020    HCT 34.7 (L) 08/05/2020     08/05/2020    CHOL 149 01/09/2020    TRIG 134 01/09/2020    HDL 46 01/09/2020    ALT 14 01/09/2020    AST 19 01/09/2020     08/05/2020    K 4.1 08/05/2020     08/05/2020    CREATININE 1.0 08/05/2020    BUN 22 08/05/2020    CO2 23 08/05/2020    TSH 1.209 08/05/2020    INR 1.0 08/05/2020    HGBA1C 5.5 05/27/2011       Diagnostic Studies: No relevant studies.    EKG:   Results for orders placed or performed during the hospital encounter of 12/30/19   EKG 12-lead  (SOB)    Collection Time: 12/30/19  4:21 PM    Narrative    Test Reason : R06.02,    Vent. Rate : 065 BPM     Atrial Rate : 065 BPM     P-R Int : 180 ms          QRS Dur : 092 ms      QT  Int : 428 ms       P-R-T Axes : 047 021 025 degrees     QTc Int : 445 ms    Normal sinus rhythm with sinus arrhythmia  Normal ECG  When compared with ECG of 17-OCT-2019 16:05,  No significant change was found  Confirmed by Ryne Stahl MD ([id number]) on 12/31/2019 8:49:03 AM    Referred By:             Confirmed By:Ryne Stahl MD       2D ECHO:  TTE: 2016  QEF 55 - 65 65  65 R    Diastolic Dysfunction  YesAbnormal          Nuclear Stress Echo:  CONCLUSIONS     1 - Normal left ventricular systolic function (EF 55-60%).     2 - Normal left ventricular diastolic function.     3 - Normal right ventricular systolic function .     No evidence of stress induced myocardial ischemia.       Imaging   Carotid US 4/2019  There is 50-59% left Internal Carotid Stenosis.  There is 50-59% right Internal Carotid Stenosis    C-Spine Xray 2015  Narrative & Impression     AP, lateral, and odontoid views obtained.  There is straightening and reversal of the normal cervical lordosis at the level of C3-4.  Advanced disk space narrowing is present at C3-4 and C4-5 with advanced osteophytic spurring at these levels.  Disk   space narrowing is present to a lesser extent at C5-6 and C6-7 however there is advanced osteophytic spurring.  Minor anterolisthesis is present at C6-7.  There is an intact odontoid.  Advanced uncovertebral spurring is present.  IMPRESSION:    As above         L-Spine 2/2019  FINDINGS:  Generalized osteopenia.  Mild curvature of the lumbar spine, convex toward the right.  Lateral view demonstrates minimal retrolisthesis of L1 with respect L2 and minimal anterolisthesis of L4 with respect L5.  Vertebral body heights appear adequately maintained.  Severe hypertrophic spurring and disc space narrowing at the L1-2 and L2-3 levels with at least moderate hypertrophic spurring and disc space narrowing at the L5-S1 level.  Mild spurring and disc space narrowing at the L3-4 and L4-5 levels.  Considerable degenerative changes at  the lower facet joints bilaterally.  No definite evidence of spondylolysis.  No fracture or osseous destruction.  Pedicles appear intact.  Overall appearance of the lumbar spine is similar to the previous study.     IMPRESSION:      Marked DJD, particularly the L1-2 and L2-3 levels, unchanged             Revised Cardiac Risk Index   High -Risk Surgery  Intraperitoneal; Intrathoracic; suprainguinal vascular Yes- + 1 No- 0   History of Ischemic Heart Disease   (Hx of MI/positive exercise test/current chest pain due to ischemia/use of nitrate therapy/EKG with pathological Q waves) Yes- + 1 No- 0   History of CHF  (Pulmonary edema/bilateral rales or S3 gallop/PND/CXR showing pulmonary vascular redistribution) Yes- + 1 No- 0   History of CVA   (Prior stroke or TIA) Yes- + 1 No- 0   Pre-operative treatment with insulin Yes- + 1 No- 0   Pre-operative creatinine > 2mg/dl Yes- + 1 No- 0   Total: 0      Risk Status:  Estimated risk of cardiac complications after non-cardiac surgery using the Revised Cardiac Risk Index for Preoperative risk is 3.9 %      ARISCAT (Canet) risk index: Intermediate (13.3 %)    American Society of Anesthesiologists Physical Status classification (ASA): 3    [facility] respiratory failure index: 0.5 %           No further cardiac workup needed prior to surgery.    Outpatient Subjective & Objective

## 2020-08-07 NOTE — ASSESSMENT & PLAN NOTE
"Likely due to patient taking  Aliskiren which causes a "falsely elevated ratio" per Endocrinology note. .   "

## 2020-08-07 NOTE — HPI
This is a 87 y.o. female  who presents today for a preoperative evaulation in preparation for Orthopedic  surgery. Scheduled for left total knee arthroplasty .  States  surgery is indicated for left knee pain.   Patient is new to me.  Details of current problem: The duration of problem is > five years .   Reports bilateral knee pain but L>R. Reports symptoms of achy pain to left knee . Has tried steroid injections and physical therapy with minimal relief.  Aggravating Factors include: walking and weightbearing.  Relieving factors are Rest, Tylenol Arthritis and Aspercreme cream prn.   Denies pain at this time. Uses cane for assistance with mobility.   The history has been obtained by speaking with the patient and reviewing the electronic medical record and/or outside health information. Significant health conditions for the perioperative period are discussed below in assessment and plan.     Patient reports current health status to be Fair.  Denies any new symptoms before surgery.

## 2020-08-07 NOTE — ASSESSMENT & PLAN NOTE
Occasional lower back pain; stretching exercises work well   Symptoms: occasional radiating pain down left leg  Seen by Rheumatology 5/2020- referred to PT

## 2020-08-07 NOTE — PROGRESS NOTES
[facility] - Pre Op Consult  Progress Note    Patient Name: Gerda Lai  MRN: [id number]  Date of Evaluation- 08/07/2020  PCP- Tomas Beltran MD    Future cases for Gerda Lai [[id number]]     Case ID Status Date Time Jack Procedure Provider Location    [id number] [facility] 8/10/2020 11:00  ARTHROPLASTY, KNEE-ADE NEXGEN/CEMENTED TIBIA Kalin Pacheco MD [[id number]] [facility] OR          HPI:  This is a 87 y.o. female  who presents today for a preoperative evaulation in preparation for Orthopedic  surgery. Scheduled for left total knee arthroplasty .  States  surgery is indicated for left knee pain.   Patient is new to me.  Details of current problem: The duration of problem is > five years .   Reports bilateral knee pain but L>R. Reports symptoms of achy pain to left knee . Has tried steroid injections and physical therapy with minimal relief.  Aggravating Factors include: walking and weightbearing.  Relieving factors are Rest, Tylenol Arthritis and Aspercreme cream prn.   Denies pain at this time. Uses cane for assistance with mobility.   The history has been obtained by speaking with the patient and reviewing the electronic medical record and/or outside health information. Significant health conditions for the perioperative period are discussed below in assessment and plan.     Patient reports current health status to be Fair.  Denies any new symptoms before surgery.       Subjective/ Objective:     Chief Complaint: Preoperative evaulation, perioperative medical management, and complication reduction plan.     Functional Capacity:  stretching exercises daily: 45-one hour; denies CP/SOB. Will get some fatigue with housework and prolonged walking.       Anesthesia issues: None  Difficulty mouth opening: No  Steroid use in the last 12 months:  Yes    Family anesthesia difficulty: None       Active Cardiac Conditions: None    Revised Cardiac Risk Index Predictors: None     Family Hx of Thrombosis: None    Past Medical History  Pertinent Negatives:   Diagnosis Date Noted    Amblyopia 09/14/2012    Asthma 08/07/2020    Cataract 09/14/2012    COPD (chronic obstructive pulmonary disease) 08/07/2020    Coronary artery disease 08/07/2020    Deep vein thrombosis 08/07/2020    Diabetes mellitus 09/14/2012    Diabetes mellitus, type 2 08/07/2020    Diabetic retinopathy 09/14/2012    Diabetic retinopathy 02/05/2014    Glaucoma 09/14/2012    Glaucoma 02/05/2014    Macular degeneration 09/14/2012    Myocardial infarction 08/07/2020    Pulmonary embolism 08/07/2020    Retinal detachment 09/14/2012    Seizures 08/07/2020    Strabismus 09/14/2012    Stroke 08/07/2020    Uveitis 09/14/2012       Past Surgical History:   Procedure Laterality Date    ADENOIDECTOMY      BREAST BIOPSY Left     Excisional bx, benign    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    CATARACT EXTRACTION W/  INTRAOCULAR LENS IMPLANT Bilateral     EYE SURGERY      JOINT REPLACEMENT Right     ELZBIETA    thyriodectomy      partial    tonsillectomy      TONSILLECTOMY      TOTAL HIP ARTHROPLASTY      right    Yag  Left        Review of Systems   Constitutional: Positive for fatigue. Negative for chills, fever and unexpected weight change.   HENT: Positive for hearing loss and postnasal drip. Negative for dental problem, rhinorrhea, sore throat, tinnitus and trouble swallowing.    Eyes: Negative for photophobia, pain, discharge and visual disturbance.   Respiratory: Negative for apnea, cough, chest tightness, shortness of breath and wheezing.         STOP BANG risk factors:  fatigue  HTN     Cardiovascular: Positive for leg swelling (attributes to felodipine). Negative for chest pain and palpitations.   Gastrointestinal: Negative for abdominal pain, blood in stool, constipation, nausea and vomiting.        Denies Fatty liver, Hepatitis   Endocrine: Negative for cold intolerance, heat intolerance, polydipsia, polyphagia and polyuria.   Genitourinary: Negative for  "decreased urine volume, difficulty urinating, dysuria, frequency, hematuria and urgency.        Nocturia   Musculoskeletal: Positive for back pain and neck pain. Negative for arthralgias and neck stiffness.   Skin: Negative for rash and wound.   Allergic/Immunologic: Negative for immunocompromised state.   Neurological: Positive for numbness (right hand- attributes to bilateral shoulder pain). Negative for dizziness, tremors, seizures, syncope, weakness and headaches.   Hematological: Negative for adenopathy. Does not bruise/bleed easily.   Psychiatric/Behavioral: Negative for confusion, hallucinations, sleep disturbance and suicidal ideas.              VITALS    Vitals - 1 value per visit 8/7/2020   SYSTOLIC 135   DIASTOLIC 61   PULSE 64   TEMPERATURE 97.9   RESPIRATIONS 16   SPO2 96   Weight (lb) 141   Weight (kg) 63.957   HEIGHT 5' 3"   BODY MASS INDEX 24.98       Physical Exam  Vitals signs reviewed.   Constitutional:       General: She is not in acute distress.     Appearance: She is well-developed.   HENT:      Head: Normocephalic.      Nose: Nose normal.      Mouth/Throat:      Pharynx: No oropharyngeal exudate.   Eyes:      General:         Right eye: No discharge.         Left eye: No discharge.      Conjunctiva/sclera: Conjunctivae normal.      Pupils: Pupils are equal, round, and reactive to light.   Neck:      Musculoskeletal: Normal range of motion. Pain with movement (right lateral) present.      Thyroid: No thyromegaly.      Vascular: No carotid bruit or JVD.      Trachea: No tracheal deviation.   Cardiovascular:      Rate and Rhythm: Normal rate and regular rhythm.      Pulses:           Carotid pulses are 2+ on the right side and 2+ on the left side.     Heart sounds: Normal heart sounds. No murmur.      Comments: Support socks and regular socks on Bilateral lower leg with boots; unable to assess DP/PT pulses.   Pulmonary:      Effort: Pulmonary effort is normal. No respiratory distress.      Breath " sounds: Normal breath sounds. No stridor. No wheezing, rhonchi or rales.   Abdominal:      General: Bowel sounds are normal. There is no distension.      Palpations: Abdomen is soft.      Tenderness: There is no guarding.   Lymphadenopathy:      Cervical: No cervical adenopathy.   Skin:     General: Skin is warm and dry.      Capillary Refill: Capillary refill takes less than 2 seconds.      Findings: No erythema or rash.   Neurological:      Mental Status: She is alert and oriented to person, place, and time.      Coordination: Coordination normal.          Significant Labs:  Lab Results   Component Value Date    WBC 8.06 08/05/2020    HGB 11.3 (L) 08/05/2020    HCT 34.7 (L) 08/05/2020     08/05/2020    CHOL 149 01/09/2020    TRIG 134 01/09/2020    HDL 46 01/09/2020    ALT 14 01/09/2020    AST 19 01/09/2020     08/05/2020    K 4.1 08/05/2020     08/05/2020    CREATININE 1.0 08/05/2020    BUN 22 08/05/2020    CO2 23 08/05/2020    TSH 1.209 08/05/2020    INR 1.0 08/05/2020    HGBA1C 5.5 05/27/2011       Diagnostic Studies: No relevant studies.    EKG:   Results for orders placed or performed during the hospital encounter of 12/30/19   EKG 12-lead  (SOB)    Collection Time: 12/30/19  4:21 PM    Narrative    Test Reason : R06.02,    Vent. Rate : 065 BPM     Atrial Rate : 065 BPM     P-R Int : 180 ms          QRS Dur : 092 ms      QT Int : 428 ms       P-R-T Axes : 047 021 025 degrees     QTc Int : 445 ms    Normal sinus rhythm with sinus arrhythmia  Normal ECG  When compared with ECG of 17-OCT-2019 16:05,  No significant change was found  Confirmed by Ryne Stahl MD ([id number]) on 12/31/2019 8:49:03 AM    Referred By:             Confirmed By:Ryne Stahl MD       2D ECHO:  TTE: 2016  QEF 55 - 65 65  65 R    Diastolic Dysfunction  YesAbnormal          Nuclear Stress Echo:  CONCLUSIONS     1 - Normal left ventricular systolic function (EF 55-60%).     2 - Normal left ventricular diastolic function.      3 - Normal right ventricular systolic function .     No evidence of stress induced myocardial ischemia.       Imaging   Carotid US 4/2019  There is 50-59% left Internal Carotid Stenosis.  There is 50-59% right Internal Carotid Stenosis    C-Spine Xray 2015  Narrative & Impression     AP, lateral, and odontoid views obtained.  There is straightening and reversal of the normal cervical lordosis at the level of C3-4.  Advanced disk space narrowing is present at C3-4 and C4-5 with advanced osteophytic spurring at these levels.  Disk   space narrowing is present to a lesser extent at C5-6 and C6-7 however there is advanced osteophytic spurring.  Minor anterolisthesis is present at C6-7.  There is an intact odontoid.  Advanced uncovertebral spurring is present.  IMPRESSION:    As above         L-Spine 2/2019  FINDINGS:  Generalized osteopenia.  Mild curvature of the lumbar spine, convex toward the right.  Lateral view demonstrates minimal retrolisthesis of L1 with respect L2 and minimal anterolisthesis of L4 with respect L5.  Vertebral body heights appear adequately maintained.  Severe hypertrophic spurring and disc space narrowing at the L1-2 and L2-3 levels with at least moderate hypertrophic spurring and disc space narrowing at the L5-S1 level.  Mild spurring and disc space narrowing at the L3-4 and L4-5 levels.  Considerable degenerative changes at the lower facet joints bilaterally.  No definite evidence of spondylolysis.  No fracture or osseous destruction.  Pedicles appear intact.  Overall appearance of the lumbar spine is similar to the previous study.     IMPRESSION:      Marked DJD, particularly the L1-2 and L2-3 levels, unchanged             Revised Cardiac Risk Index   High -Risk Surgery  Intraperitoneal; Intrathoracic; suprainguinal vascular Yes- + 1 No- 0   History of Ischemic Heart Disease   (Hx of MI/positive exercise test/current chest pain due to ischemia/use of nitrate therapy/EKG with pathological Q  waves) Yes- + 1 No- 0   History of CHF  (Pulmonary edema/bilateral rales or S3 gallop/PND/CXR showing pulmonary vascular redistribution) Yes- + 1 No- 0   History of CVA   (Prior stroke or TIA) Yes- + 1 No- 0   Pre-operative treatment with insulin Yes- + 1 No- 0   Pre-operative creatinine > 2mg/dl Yes- + 1 No- 0   Total: 0      Risk Status:  Estimated risk of cardiac complications after non-cardiac surgery using the Revised Cardiac Risk Index for Preoperative risk is 3.9 %      ARISCAT (Canet) risk index: Intermediate (13.3 %)    American Society of Anesthesiologists Physical Status classification (ASA): 3    [facility] failure index: 0.5 %           No further cardiac workup needed prior to surgery.          Orders Placed This Encounter    Ambulatory referral/consult to Podiatry           Assessment/Plan:     Hypertension, essential  Current /61  today.   Followed by [facility]; cleared per cardiology (Nicol) for surgery 8/6/20.  Encouraged keeping a healthy weight and BMI  Education was provided regarding lifestyle changes to reduce systolic BP;  Smoking cessation; Exercise 30 minutes per day,  5 days per week or 150 minutes weekly;  Sodium reduction and avoidance of high salt foods such as processed meats, frozen meals, fast foods.     labetaloL (NORMODYNE) 100 MG tablet  doxazosin (CARDURA) 1 MG tablet  felodipine (PLENDIL) 2.5 MG Tb24  aliskiren (TEKTURNA) 300 MG Tab    Anxiety  Treated with Lorazepam 0.5 mg ;controlled symptoms. Denies suicidal/ homicidal ideations. Encouraged regular exercise and healthy diet; appropriate sleep. Limit caffeine intake. Instructed to keep regular follow-up appts    HLD (hyperlipidemia)  pravastatin (PRAVACHOL) 40 MG tablet  Encouraged weight loss, healthy diet (DASH/Mediterranean) and exercise. Patient should exercise 30 minutes at least five times weekly. Limit alcohol.    Osteopenia  No longer taking Fosamax; stable    CKD (chronic  "kidney disease), stage III  BUN =22 Cr =1  GFR =50.8  Stages of CKD discussed  Reports/Denies nausea/vomiting, hematuria, or edema. Reports fatigue.  No changes in urine volume. Most recent Encouraged to avoid NSAIDs, reduce salt intake, and exercise.      Macrocytic anemia  Current labs:      RBC 3.45  Hgb 11.3  HCT 34.7      Reports fatigue, feels worn out while doing housework.   Denies Vitamin B12 or folate deficiency.      Cervical spondylosis  No recent neck pain-  received massage recently and feels better.   Reports weakness to  bilateral shoulders;  hard to lift right arm above shoulder .   Treatment: Tylenol Arthritis prn  Seen by [facility] 5/2020- referred to PT      Lumbosacral radiculopathy at L5  Occasional lower back pain; stretching exercises work well   Symptoms: occasional radiating pain down left leg  Seen by Rheumatology 5/2020- referred to PT    Hearing loss, sensorineural  Uses bilateral hearing aids    Aortic atherosclerosis   Per CXR 5/12/18; taking pravastatin 40 mg daily.    Carotid arterial disease  Per Carotid US 4/2019: 50-59% bilateral ICS    Subclavian artery stenosis, right  Followed per cardiology; last visit 3/2020- per note: "Avoid BP to right arm".    Personal history of breast cancer  Dx'd 28 years ago- left breast. Followed per Hem-Onc; last note 7/30/20. Treated with lumpectomy, radiation, and Tamoxifen.     Chronic pain of left knee  Scheduled with Dr. Pacheco on 8/10/20 for left knee arthroplasty.    Abnormal aldosterone/renin ratio  Likely due to patient taking  Aliskiren which causes a "falsely elevated ratio" per Endocrinology note. .         Discussion/Management of Perioperative Care    Thromboembolic prophylaxis (VTE) Care: Risk factors for thrombosis include: age, decreased mobility and surgical procedure.  I recommend prophylaxis of thromboembolism with the use of compression stockings/pneumatic devices, and/or pharmacologic agents. The benefits should " outweigh the risks for pharmacologic prophylaxis in the perioperative period. I also encourage early ambulation if not contraindicated during the post-operative period.    Risk factors for post-operative pulmonary complications include:age, diabetes mellitus and pre-existing renal disease. To reduce the risk of pulmonary complications, prophylactic recommendations include: incentive spirometry use/deep breathing, early ambulation and pain control.      Risk factors for renal complications include: Pre-existing renal disease, age and HTN. To reduce the risk of postoperative renal complications, I recommend the patient maintain adequate fluid volume status by drinking 2 liters of water daily.  Avoid/reduce NSAIDS and FIELDS-2 inhibitors use as well as IV contrast for renal protection.    I recommend the use of appropriate prophylactic antibiotics to reduce the risk of surgical site infections.    Delirium risk factors include advanced age and benzodiazepine use. I recommend to avoid/reduce use of benzodiazepine use (not for patients who take on a regular basis), anticholinergics, Benadryl,  and agents that may cause postoperative serotonin syndrome.  Controlled pain can decrease the risk for postop delirium and since opioids are used for postoperative pain control, I suggest using the lowest dose for the shortest amount of time necessary for pain management.     I recommend to avoid/decrease the use of benzodiazepines, anticholinergics, and Benadryl in the perioperative period. I also recommend using opioids for the shortest period of time if possible.          This visit was focused on Preoperative evaluation, Perioperative Medical management, complication reduction plans. I suggest that the patient follows up with primary care or relevant sub specialists for ongoing health care.    I appreciate the opportunity to be involved in this patients care. Please feel free to contact me if there were any questions about this  consultation.      Cleared per cardiology 8/6/20    Patient is optimized for surgery with Dr. Pacheco.    Aubrie Antoine NP  Perioperative Medicine  [facility]

## 2020-08-07 NOTE — PATIENT INSTRUCTIONS
Anesthesia: Regional Anesthesia    Youre scheduled for surgery. During surgery, youll receive medicine called anesthesia to keep you comfortable and pain-free. Your surgeon has decided that youll receive regional anesthesia. This sheet tells you what to expect with this type of anesthesia.  What is regional anesthesia?  Regional anesthesia numbs one region of your body. The anesthesia may be given around nerves or into veins in your arms, neck, or legs (nerve block or Clever block). Or it may be sent into the spinal fluid (spinal anesthesia) or into the space just outside the spinal fluid (epidural anesthesia). You may also be given sedatives to help you relax.  Nerve block or Clever block  A small area of the body, such as an arm or leg, can be numbed using a nerve block or Mireya block.  · Nerve block. During a nerve block, your skin is numbed. A needle is then inserted near nerves that serve the area to be numbed. Anesthetic is sent through the needle.  · IV regional or Mireya block. For this type of block, an IV line is put into a vein. The blood flow to the area to be numbed is blocked for a short time. Anesthetic is sent through the IV.  Spinal anesthesia  Spinal anesthesia numbs your body from about the waist down.  · Anesthetic is injected into the spinal fluid. This is a substance that surrounds the spinal cord in your spinal column. The anesthetic blocks pain traveling from the body to the brain.  · To receive the anesthetic, your skin is numbed at the injection site on your back.  · A needle is then inserted into the spinal space. Anesthetic is sent into the spinal fluid through the needle.  Epidural anesthesia  Epidural anesthesia is most commonly used during childbirth and may also be used after surgical procedures of the chest, belly, and legs.  · Anesthetic is injected into the epidural space. This is just outside the dural sac which contains the spinal fluid.  · To receive the anesthetic, your skin is  numbed at the injection site on your back.  · A needle is then inserted into the epidural space. Anesthetic is sent into the epidural space through the needle.  · A small flexible catheter may be attached to the needle and left in place. This allows for continuous injections or infusions of anesthetic.  Anesthesia tools and medicines that might be near you during your procedure  · Local anesthetic. This medicine is given through a needle numbs one region of your body.  · Electrocardiography leads (electrodes). These are used to record your heart rate and rhythm.  · Blood pressure cuff. A cuff is placed on your arm to keep track of your blood pressure.  · Pulse oximeter. This small clip is placed on the end of the finger. It measures your blood oxygen level.  · Sedatives. These medicines may be given through an IV. They help to relax you and keep you comfortable. You may stay awake or sleep lightly.  · Oxygen. You may be given oxygen through a facemask.  Risks and possible complications  Regional anesthesia carries some risks. These include:  · Nausea and vomiting  · Headache  · Backache  · Decreased blood pressure  · Allergic reaction to the anesthetic  · Ongoing numbness (rare)  · Irregular heartbeat (rare)  · Cardiac arrest (rare)   Date Last Reviewed: 12/1/2016  © 4178-4713 [employer]. [address] [place] [address], [place] [address]. All rights reserved. This information is not intended as a substitute for professional medical care. Always follow your healthcare professional's instructions.

## 2020-08-07 NOTE — ASSESSMENT & PLAN NOTE
No recent neck pain-  received massage recently and feels better.   Reports weakness to  bilateral shoulders;  hard to lift right arm above shoulder .   Treatment: Tylenol Arthritis prn  [facility]

## 2020-08-07 NOTE — LETTER
August 7, 2020      Kalin Pacheco MD  [address]  [facility] [address]           [facility] - Pre Op Consult  [address] [facility] [address]  Phone: [phone]          Patient: Gerda Lai   MR Number: [id number]   YOB: 1933   Date of Visit: 8/7/2020       Dear Dr. Kalin Pacheco:    Thank you for referring Gerda Lai to me for evaluation. Attached you will find relevant portions of my assessment and plan of care.    If you have questions, please do not hesitate to call me. I look forward to following Gerda Lai along with you.    Sincerely,    Aubrie Antoine, NP    Enclosure  CC:  No Recipients    If you would like to receive this communication electronically, please contact [email or web address] or [phone] to request more information on [employer] access.    For providers and/or their staff who would like to refer a patient to [facility], please contact us through our one-stop-shop provider referral line, Obed Salazar, at [phone].    If you feel you have received this communication in error or would no longer like to receive these types of communications, please e-mail [email or web address]

## 2020-08-07 NOTE — DISCHARGE INSTRUCTIONS
Your surgery has been scheduled for:8/10    You should report to:    ____[facility], located on the [place] side of the first floor of the           [facility] ([phone])    ____[facility], located on the [facility] side of the            Second floor of the [facility] ([phone])    __X__[facility]       Please Note   - Tell your doctor if you take Aspirin, products containing Aspirin, herbal medications or blood thinners, such as Coumadin, Ticlid, or Plavix.  (Consult your provider regarding holding or stopping before surgery).  - Arrange for someone to drive you home following surgery.  You will not be allowed to leave the surgical facility alone or drive yourself home following sedation and anesthesia.    Before Surgery  - Stop taking all vitamins/ herbal medications 7 days prior to surgery  - No Motrin/Advil (Ibuprofen) 7 days before surgery  - No Aleve (Naproxen) 7 days before surgery  - No Goody's/BC  Powder 7 days before surgery  - Refrain from drinking alcoholic beverages for 24 hours before and after surgery  - Stop or limit smoking 14 days before surgery  - You may take Tylenol for pain    Night before Surgery-NOTHING TO EAT OR DRINK AFTER MIDNIGHT    - Take a shower or bath (shower is recommended).  Bathe with Hibiclens soap or an antibacterial soap from the neck down.  If not supplied by your surgeon, hibiclens soap will need to be purchased over the counter in pharmacy.  Rinse soap off thoroughly.  - Shampoo your hair with your regular shampoo    The Day of Surgery  - Take another bath or shower with hibiclens or any antibacterial soap, to reduce the chance of infection.  - Take heart and blood pressure medications with a small sip of water, as advised by the perioperative team.  - Do not take fluid pills  - You may brush your teeth and rinse your mouth, but do not swallow any additional water.   - Do  not apply perfumes, powder, body lotions or deodorant on the day of surgery.  - Nail polish should be removed.  - Do not wear makeup or moisturizer.  - Wear comfortable clothes, such as a button front shirt and loose fitting pants.  - Leave all jewelry, including body piercings, and valuables at home.    - Bring any devices you will neeed after surgery such as crutches or canes.  - If you have sleep apnea, please bring your CPAP machine.    In the event that your physical condition changes including the onset of a cold or respiratory illness, or if you have to delay or cancel your surgery, please notify your surgeon.  Anesthesia: Regional Anesthesia    Youre scheduled for surgery. During surgery, youll receive medicine called anesthesia to keep you comfortable and pain-free. Your surgeon has decided that youll receive regional anesthesia. This sheet tells you what to expect with this type of anesthesia.  What is regional anesthesia?  Regional anesthesia numbs one region of your body. The anesthesia may be given around nerves or into veins in your arms, neck, or legs (nerve block or Mireya block). Or it may be sent into the spinal fluid (spinal anesthesia) or into the space just outside the spinal fluid (epidural anesthesia). You may also be given sedatives to help you relax.  Nerve block or Mireya block  A small area of the body, such as an arm or leg, can be numbed using a nerve block or Mireya block.  · Nerve block. During a nerve block, your skin is numbed. A needle is then inserted near nerves that serve the area to be numbed. Anesthetic is sent through the needle.  · IV regional or Mireya block. For this type of block, an IV line is put into a vein. The blood flow to the area to be numbed is blocked for a short time. Anesthetic is sent through the IV.  Spinal anesthesia  Spinal anesthesia numbs your body from about the waist down.  · Anesthetic is injected into the spinal fluid. This is a substance that surrounds  the spinal cord in your spinal column. The anesthetic blocks pain traveling from the body to the brain.  · To receive the anesthetic, your skin is numbed at the injection site on your back.  · A needle is then inserted into the spinal space. Anesthetic is sent into the spinal fluid through the needle.  Epidural anesthesia  Epidural anesthesia is most commonly used during childbirth and may also be used after surgical procedures of the chest, belly, and legs.  · Anesthetic is injected into the epidural space. This is just outside the dural sac which contains the spinal fluid.  · To receive the anesthetic, your skin is numbed at the injection site on your back.  · A needle is then inserted into the epidural space. Anesthetic is sent into the epidural space through the needle.  · A small flexible catheter may be attached to the needle and left in place. This allows for continuous injections or infusions of anesthetic.  Anesthesia tools and medicines that might be near you during your procedure  · Local anesthetic. This medicine is given through a needle numbs one region of your body.  · Electrocardiography leads (electrodes). These are used to record your heart rate and rhythm.  · Blood pressure cuff. A cuff is placed on your arm to keep track of your blood pressure.  · Pulse oximeter. This small clip is placed on the end of the finger. It measures your blood oxygen level.  · Sedatives. These medicines may be given through an IV. They help to relax you and keep you comfortable. You may stay awake or sleep lightly.  · Oxygen. You may be given oxygen through a facemask.  Risks and possible complications  Regional anesthesia carries some risks. These include:  · Nausea and vomiting  · Headache  · Backache  · Decreased blood pressure  · Allergic reaction to the anesthetic  · Ongoing numbness (rare)  · Irregular heartbeat (rare)  · Cardiac arrest (rare)   Date Last Reviewed: 12/1/2016  © 5201-6468 [employer]  [address] [place] [address]. All rights reserved. This information is not intended as a substitute for professional medical care. Always follow your healthcare professional's instructions.

## 2020-08-07 NOTE — TELEPHONE ENCOUNTER
"----- Message from Kalin Pacheco MD sent at 8/7/2020  3:19 PM CDT -----  Regarding: RE: Post-op rehab  Scotty Kumar, would you talk to her, please?  I would worry about her getting COVID at a facility, and would rather postpone her surgery than send her to a nursing home.  We can't send her to an "assisted-living facility" as far as I know.    ----- Message -----  From: Josie Beard MA  Sent: 8/7/2020   3:15 PM CDT  To: Kalin Pacheco MD  Subject: FW: Post-op rehab                                  ----- Message -----  From: Aubrie Antoine NP  Sent: 8/7/2020   3:03 PM CDT  To: [facility]ANK[facility] Staff  Subject: Post-op rehab                                    Ms. Ming Kline is requesting a phone call for questions on post-op stay and care. She lives alone and is requesting to be sent to an assisted living facility after surgery.  Her surgery is scheduled at [place].    Thanks,  Aubrie Antoine NP  Anesthesia Perioperative Care  Ext. [phone]           "

## 2020-08-07 NOTE — TELEPHONE ENCOUNTER
Called patient regarding assisted living/SNF placement. Left a voicemail letting her know that, due to covid, we would have to delay her surgery if she's unable to secure care at home following her knee replacement.

## 2020-08-08 LAB — SARS-COV-2 RNA RESP QL NAA+PROBE: NOT DETECTED

## 2020-08-08 NOTE — PLAN OF CARE
"Contacted pt to correct the time of arrival I left for her from 5 am- she states she was told 8 am by office which I confirmed.  She had questions about going to a nursing assisted facility after that I cannot answer for her.  I saw a message in the chart pertaining to this.  I will send a message to the NP however I advised her to speak to the office as I was only calling to confirm time of arrival.  Voiced understanding.  She states the facility she wishes to go to her niece works at and she says "only a few people are there".  "

## 2020-08-08 NOTE — PLAN OF CARE
Arrival time 0500 given.  [place] address  and [address] provided  Had to leave voicemail    Covid pending    Medications reviewed per PAT at main csmpus    Non smoker    NPO, bathing instructions, visitor policy reviewedd in mychart sent

## 2020-08-10 ENCOUNTER — ANESTHESIA (OUTPATIENT)
Dept: SURGERY | Facility: HOSPITAL | Age: 85
End: 2020-08-10
Payer: MEDICARE

## 2020-08-10 ENCOUNTER — HOSPITAL ENCOUNTER (OUTPATIENT)
Facility: HOSPITAL | Age: 85
Discharge: HOME HEALTH | End: 2020-08-12
Attending: ORTHOPAEDIC SURGERY | Admitting: STUDENT IN AN ORGANIZED HEALTH CARE EDUCATION/TRAINING PROGRAM
Payer: MEDICARE

## 2020-08-10 DIAGNOSIS — I10 ESSENTIAL HYPERTENSION: ICD-10-CM

## 2020-08-10 DIAGNOSIS — M25.562 LEFT KNEE PAIN, UNSPECIFIED CHRONICITY: ICD-10-CM

## 2020-08-10 DIAGNOSIS — M17.12 PRIMARY OSTEOARTHRITIS OF LEFT KNEE: ICD-10-CM

## 2020-08-10 DIAGNOSIS — R29.898 LEFT LEG WEAKNESS: Primary | ICD-10-CM

## 2020-08-10 PROCEDURE — 25000003 PHARM REV CODE 250: Performed by: NURSE PRACTITIONER

## 2020-08-10 PROCEDURE — 37000009 HC ANESTHESIA EA ADD 15 MINS: Performed by: ORTHOPAEDIC SURGERY

## 2020-08-10 PROCEDURE — 94770 HC EXHALED C02 TEST: CPT

## 2020-08-10 PROCEDURE — 27201423 OPTIME MED/SURG SUP & DEVICES STERILE SUPPLY: Performed by: ORTHOPAEDIC SURGERY

## 2020-08-10 PROCEDURE — 63600175 PHARM REV CODE 636 W HCPCS: Performed by: NURSE ANESTHETIST, CERTIFIED REGISTERED

## 2020-08-10 PROCEDURE — 76942 ECHO GUIDE FOR BIOPSY: CPT | Mod: 26,,, | Performed by: ANESTHESIOLOGY

## 2020-08-10 PROCEDURE — D9220A PRA ANESTHESIA: Mod: ANES,,, | Performed by: ANESTHESIOLOGY

## 2020-08-10 PROCEDURE — 99900035 HC TECH TIME PER 15 MIN (STAT)

## 2020-08-10 PROCEDURE — C1751 CATH, INF, PER/CENT/MIDLINE: HCPCS | Performed by: STUDENT IN AN ORGANIZED HEALTH CARE EDUCATION/TRAINING PROGRAM

## 2020-08-10 PROCEDURE — 25000003 PHARM REV CODE 250: Performed by: NURSE ANESTHETIST, CERTIFIED REGISTERED

## 2020-08-10 PROCEDURE — 63600175 PHARM REV CODE 636 W HCPCS: Performed by: NURSE PRACTITIONER

## 2020-08-10 PROCEDURE — 94761 N-INVAS EAR/PLS OXIMETRY MLT: CPT

## 2020-08-10 PROCEDURE — 64448 PR NERVE BLOCK INJ, ANES/STEROID, FEMORAL, CONT INFUSION, INCL IMAG GUIDANCE: ICD-10-PCS | Mod: 59,LT,, | Performed by: ANESTHESIOLOGY

## 2020-08-10 PROCEDURE — 64448 NJX AA&/STRD FEM NRV NFS IMG: CPT | Mod: 59,LT,, | Performed by: ANESTHESIOLOGY

## 2020-08-10 PROCEDURE — 27100025 HC TUBING, SET FLUID WARMER: Performed by: ANESTHESIOLOGY

## 2020-08-10 PROCEDURE — S0028 INJECTION, FAMOTIDINE, 20 MG: HCPCS | Performed by: NURSE ANESTHETIST, CERTIFIED REGISTERED

## 2020-08-10 PROCEDURE — C1713 ANCHOR/SCREW BN/BN,TIS/BN: HCPCS | Performed by: ORTHOPAEDIC SURGERY

## 2020-08-10 PROCEDURE — 76942 PR U/S GUIDANCE FOR NEEDLE GUIDANCE: ICD-10-PCS | Mod: 26,,, | Performed by: ANESTHESIOLOGY

## 2020-08-10 PROCEDURE — 71000033 HC RECOVERY, INTIAL HOUR: Performed by: ORTHOPAEDIC SURGERY

## 2020-08-10 PROCEDURE — 37000008 HC ANESTHESIA 1ST 15 MINUTES: Performed by: ORTHOPAEDIC SURGERY

## 2020-08-10 PROCEDURE — 25000003 PHARM REV CODE 250: Performed by: ORTHOPAEDIC SURGERY

## 2020-08-10 PROCEDURE — 27447 PR TOTAL KNEE ARTHROPLASTY: ICD-10-PCS | Mod: LT,GC,, | Performed by: ORTHOPAEDIC SURGERY

## 2020-08-10 PROCEDURE — 63600175 PHARM REV CODE 636 W HCPCS: Performed by: ORTHOPAEDIC SURGERY

## 2020-08-10 PROCEDURE — 94799 UNLISTED PULMONARY SVC/PX: CPT

## 2020-08-10 PROCEDURE — D9220A PRA ANESTHESIA: Mod: CRNA,,, | Performed by: NURSE ANESTHETIST, CERTIFIED REGISTERED

## 2020-08-10 PROCEDURE — 63600175 PHARM REV CODE 636 W HCPCS: Performed by: ANESTHESIOLOGY

## 2020-08-10 PROCEDURE — 36000711: Performed by: ORTHOPAEDIC SURGERY

## 2020-08-10 PROCEDURE — 36000710: Performed by: ORTHOPAEDIC SURGERY

## 2020-08-10 PROCEDURE — D9220A PRA ANESTHESIA: ICD-10-PCS | Mod: CRNA,,, | Performed by: NURSE ANESTHETIST, CERTIFIED REGISTERED

## 2020-08-10 PROCEDURE — 76942 ECHO GUIDE FOR BIOPSY: CPT | Performed by: STUDENT IN AN ORGANIZED HEALTH CARE EDUCATION/TRAINING PROGRAM

## 2020-08-10 PROCEDURE — 27447 TOTAL KNEE ARTHROPLASTY: CPT | Mod: LT,GC,, | Performed by: ORTHOPAEDIC SURGERY

## 2020-08-10 PROCEDURE — C1776 JOINT DEVICE (IMPLANTABLE): HCPCS | Performed by: ORTHOPAEDIC SURGERY

## 2020-08-10 PROCEDURE — 25000003 PHARM REV CODE 250: Performed by: ANESTHESIOLOGY

## 2020-08-10 PROCEDURE — D9220A PRA ANESTHESIA: ICD-10-PCS | Mod: ANES,,, | Performed by: ANESTHESIOLOGY

## 2020-08-10 PROCEDURE — 64448 NJX AA&/STRD FEM NRV NFS IMG: CPT | Performed by: STUDENT IN AN ORGANIZED HEALTH CARE EDUCATION/TRAINING PROGRAM

## 2020-08-10 PROCEDURE — 25000003 PHARM REV CODE 250: Performed by: STUDENT IN AN ORGANIZED HEALTH CARE EDUCATION/TRAINING PROGRAM

## 2020-08-10 DEVICE — PLUG TAPER: Type: IMPLANTABLE DEVICE | Site: KNEE | Status: FUNCTIONAL

## 2020-08-10 DEVICE — IMPLANTABLE DEVICE: Type: IMPLANTABLE DEVICE | Site: KNEE | Status: FUNCTIONAL

## 2020-08-10 DEVICE — TIBIAL NEXGEN PRECOAT STEM: Type: IMPLANTABLE DEVICE | Site: KNEE | Status: FUNCTIONAL

## 2020-08-10 DEVICE — CEMENT BONE SMPLX HV GENTMYCN: Type: IMPLANTABLE DEVICE | Site: KNEE | Status: FUNCTIONAL

## 2020-08-10 DEVICE — TIBIAL INSERT POST SZ CD 3-4 1: Type: IMPLANTABLE DEVICE | Site: KNEE | Status: FUNCTIONAL

## 2020-08-10 RX ORDER — SODIUM CHLORIDE 9 MG/ML
INJECTION, SOLUTION INTRAVENOUS CONTINUOUS PRN
Status: DISCONTINUED | OUTPATIENT
Start: 2020-08-10 | End: 2020-08-10

## 2020-08-10 RX ORDER — ROPIVACAINE/EPI/CLONIDINE/KET 2.46-0.005
SYRINGE (ML) INJECTION
Status: DISCONTINUED | OUTPATIENT
Start: 2020-08-10 | End: 2020-08-10 | Stop reason: HOSPADM

## 2020-08-10 RX ORDER — CELECOXIB 200 MG/1
400 CAPSULE ORAL
Status: DISCONTINUED | OUTPATIENT
Start: 2020-08-10 | End: 2020-08-10

## 2020-08-10 RX ORDER — FENTANYL CITRATE 50 UG/ML
25 INJECTION, SOLUTION INTRAMUSCULAR; INTRAVENOUS EVERY 5 MIN PRN
Status: DISCONTINUED | OUTPATIENT
Start: 2020-08-10 | End: 2020-08-10 | Stop reason: HOSPADM

## 2020-08-10 RX ORDER — DOXAZOSIN 1 MG/1
1 TABLET ORAL NIGHTLY
Status: DISCONTINUED | OUTPATIENT
Start: 2020-08-10 | End: 2020-08-12 | Stop reason: HOSPADM

## 2020-08-10 RX ORDER — TRANEXAMIC ACID 100 MG/ML
1000 INJECTION, SOLUTION INTRAVENOUS
Status: COMPLETED | OUTPATIENT
Start: 2020-08-10 | End: 2020-08-10

## 2020-08-10 RX ORDER — VANCOMYCIN HCL IN 5 % DEXTROSE 1G/250ML
1000 PLASTIC BAG, INJECTION (ML) INTRAVENOUS
Status: COMPLETED | OUTPATIENT
Start: 2020-08-10 | End: 2020-08-10

## 2020-08-10 RX ORDER — ROPIVACAINE HYDROCHLORIDE 2 MG/ML
4 INJECTION, SOLUTION EPIDURAL; INFILTRATION; PERINEURAL CONTINUOUS
Status: DISCONTINUED | OUTPATIENT
Start: 2020-08-10 | End: 2020-08-12 | Stop reason: HOSPADM

## 2020-08-10 RX ORDER — ESMOLOL HYDROCHLORIDE 10 MG/ML
INJECTION INTRAVENOUS
Status: DISCONTINUED | OUTPATIENT
Start: 2020-08-10 | End: 2020-08-10

## 2020-08-10 RX ORDER — LABETALOL HYDROCHLORIDE 5 MG/ML
INJECTION, SOLUTION INTRAVENOUS
Status: DISCONTINUED | OUTPATIENT
Start: 2020-08-10 | End: 2020-08-10

## 2020-08-10 RX ORDER — TRANEXAMIC ACID 100 MG/ML
1000 INJECTION, SOLUTION INTRAVENOUS
Status: DISCONTINUED | OUTPATIENT
Start: 2020-08-10 | End: 2020-08-10 | Stop reason: HOSPADM

## 2020-08-10 RX ORDER — LORAZEPAM 0.5 MG/1
0.5 TABLET ORAL EVERY 12 HOURS PRN
Status: DISCONTINUED | OUTPATIENT
Start: 2020-08-10 | End: 2020-08-12 | Stop reason: HOSPADM

## 2020-08-10 RX ORDER — ALBUTEROL SULFATE 90 UG/1
2 AEROSOL, METERED RESPIRATORY (INHALATION) EVERY 6 HOURS PRN
Status: DISCONTINUED | OUTPATIENT
Start: 2020-08-10 | End: 2020-08-12 | Stop reason: HOSPADM

## 2020-08-10 RX ORDER — AMOXICILLIN 250 MG
1 CAPSULE ORAL 2 TIMES DAILY
Status: DISCONTINUED | OUTPATIENT
Start: 2020-08-10 | End: 2020-08-12 | Stop reason: HOSPADM

## 2020-08-10 RX ORDER — NALOXONE HCL 0.4 MG/ML
0.02 VIAL (ML) INJECTION
Status: DISCONTINUED | OUTPATIENT
Start: 2020-08-10 | End: 2020-08-12 | Stop reason: HOSPADM

## 2020-08-10 RX ORDER — ACETAMINOPHEN 500 MG
1000 TABLET ORAL
Status: DISCONTINUED | OUTPATIENT
Start: 2020-08-10 | End: 2020-08-10 | Stop reason: HOSPADM

## 2020-08-10 RX ORDER — BISACODYL 10 MG
10 SUPPOSITORY, RECTAL RECTAL EVERY 12 HOURS PRN
Status: DISCONTINUED | OUTPATIENT
Start: 2020-08-10 | End: 2020-08-12 | Stop reason: HOSPADM

## 2020-08-10 RX ORDER — PANTOPRAZOLE SODIUM 40 MG/1
40 TABLET, DELAYED RELEASE ORAL DAILY
Status: DISCONTINUED | OUTPATIENT
Start: 2020-08-10 | End: 2020-08-12 | Stop reason: HOSPADM

## 2020-08-10 RX ORDER — ONDANSETRON 2 MG/ML
4 INJECTION INTRAMUSCULAR; INTRAVENOUS EVERY 8 HOURS PRN
Status: DISCONTINUED | OUTPATIENT
Start: 2020-08-10 | End: 2020-08-12 | Stop reason: HOSPADM

## 2020-08-10 RX ORDER — LIDOCAINE HYDROCHLORIDE 10 MG/ML
1 INJECTION, SOLUTION EPIDURAL; INFILTRATION; INTRACAUDAL; PERINEURAL
Status: DISCONTINUED | OUTPATIENT
Start: 2020-08-10 | End: 2020-08-10 | Stop reason: HOSPADM

## 2020-08-10 RX ORDER — CELECOXIB 200 MG/1
200 CAPSULE ORAL DAILY
Status: DISCONTINUED | OUTPATIENT
Start: 2020-08-10 | End: 2020-08-10

## 2020-08-10 RX ORDER — MUPIROCIN 20 MG/G
1 OINTMENT TOPICAL 2 TIMES DAILY
Status: DISCONTINUED | OUTPATIENT
Start: 2020-08-10 | End: 2020-08-12 | Stop reason: HOSPADM

## 2020-08-10 RX ORDER — SODIUM CHLORIDE 0.9 % (FLUSH) 0.9 %
10 SYRINGE (ML) INJECTION
Status: DISCONTINUED | OUTPATIENT
Start: 2020-08-10 | End: 2020-08-10 | Stop reason: HOSPADM

## 2020-08-10 RX ORDER — MIDAZOLAM HYDROCHLORIDE 1 MG/ML
2 INJECTION INTRAMUSCULAR; INTRAVENOUS
Status: COMPLETED | OUTPATIENT
Start: 2020-08-10 | End: 2020-08-10

## 2020-08-10 RX ORDER — PRAVASTATIN SODIUM 20 MG/1
40 TABLET ORAL DAILY
Status: DISCONTINUED | OUTPATIENT
Start: 2020-08-11 | End: 2020-08-12 | Stop reason: HOSPADM

## 2020-08-10 RX ORDER — ONDANSETRON 2 MG/ML
INJECTION INTRAMUSCULAR; INTRAVENOUS
Status: DISCONTINUED | OUTPATIENT
Start: 2020-08-10 | End: 2020-08-10

## 2020-08-10 RX ORDER — MUPIROCIN 20 MG/G
1 OINTMENT TOPICAL
Status: COMPLETED | OUTPATIENT
Start: 2020-08-10 | End: 2020-08-10

## 2020-08-10 RX ORDER — PREGABALIN 75 MG/1
75 CAPSULE ORAL NIGHTLY
Status: DISCONTINUED | OUTPATIENT
Start: 2020-08-10 | End: 2020-08-12 | Stop reason: HOSPADM

## 2020-08-10 RX ORDER — SODIUM CHLORIDE 9 MG/ML
INJECTION, SOLUTION INTRAVENOUS
Status: COMPLETED | OUTPATIENT
Start: 2020-08-10 | End: 2020-08-10

## 2020-08-10 RX ORDER — ACETAMINOPHEN 500 MG
1000 TABLET ORAL EVERY 6 HOURS
Status: COMPLETED | OUTPATIENT
Start: 2020-08-10 | End: 2020-08-12

## 2020-08-10 RX ORDER — ALISKIREN 300 MG/1
300 TABLET, FILM COATED ORAL NIGHTLY
Status: DISCONTINUED | OUTPATIENT
Start: 2020-08-10 | End: 2020-08-12 | Stop reason: HOSPADM

## 2020-08-10 RX ORDER — DEXAMETHASONE SODIUM PHOSPHATE 4 MG/ML
INJECTION, SOLUTION INTRA-ARTICULAR; INTRALESIONAL; INTRAMUSCULAR; INTRAVENOUS; SOFT TISSUE
Status: DISCONTINUED | OUTPATIENT
Start: 2020-08-10 | End: 2020-08-10

## 2020-08-10 RX ORDER — OXYCODONE HYDROCHLORIDE 5 MG/1
5 TABLET ORAL
Status: DISCONTINUED | OUTPATIENT
Start: 2020-08-10 | End: 2020-08-12 | Stop reason: HOSPADM

## 2020-08-10 RX ORDER — PROPOFOL 10 MG/ML
VIAL (ML) INTRAVENOUS CONTINUOUS PRN
Status: DISCONTINUED | OUTPATIENT
Start: 2020-08-10 | End: 2020-08-10

## 2020-08-10 RX ORDER — POLYETHYLENE GLYCOL 3350 17 G/17G
17 POWDER, FOR SOLUTION ORAL DAILY
Status: DISCONTINUED | OUTPATIENT
Start: 2020-08-11 | End: 2020-08-12 | Stop reason: HOSPADM

## 2020-08-10 RX ORDER — CEFAZOLIN SODIUM 1 G/3ML
2 INJECTION, POWDER, FOR SOLUTION INTRAMUSCULAR; INTRAVENOUS
Status: COMPLETED | OUTPATIENT
Start: 2020-08-10 | End: 2020-08-10

## 2020-08-10 RX ORDER — PREGABALIN 75 MG/1
75 CAPSULE ORAL
Status: COMPLETED | OUTPATIENT
Start: 2020-08-10 | End: 2020-08-10

## 2020-08-10 RX ORDER — KETAMINE HCL IN 0.9 % NACL 50 MG/5 ML
SYRINGE (ML) INTRAVENOUS
Status: DISCONTINUED | OUTPATIENT
Start: 2020-08-10 | End: 2020-08-10

## 2020-08-10 RX ORDER — VANCOMYCIN HYDROCHLORIDE 1 G/20ML
INJECTION, POWDER, LYOPHILIZED, FOR SOLUTION INTRAVENOUS
Status: DISCONTINUED | OUTPATIENT
Start: 2020-08-10 | End: 2020-08-10 | Stop reason: HOSPADM

## 2020-08-10 RX ORDER — OXYCODONE HYDROCHLORIDE 10 MG/1
10 TABLET ORAL
Status: DISCONTINUED | OUTPATIENT
Start: 2020-08-10 | End: 2020-08-12 | Stop reason: HOSPADM

## 2020-08-10 RX ORDER — SODIUM CHLORIDE 9 MG/ML
INJECTION, SOLUTION INTRAVENOUS CONTINUOUS
Status: ACTIVE | OUTPATIENT
Start: 2020-08-10 | End: 2020-08-11

## 2020-08-10 RX ORDER — LABETALOL 100 MG/1
100 TABLET, FILM COATED ORAL EVERY 12 HOURS
Status: DISCONTINUED | OUTPATIENT
Start: 2020-08-10 | End: 2020-08-12 | Stop reason: HOSPADM

## 2020-08-10 RX ORDER — CARBOXYMETHYLCELLULOSE SODIUM 10 MG/ML
GEL OPHTHALMIC
Status: DISCONTINUED | OUTPATIENT
Start: 2020-08-10 | End: 2020-08-10

## 2020-08-10 RX ORDER — CEFAZOLIN SODIUM 1 G/3ML
2 INJECTION, POWDER, FOR SOLUTION INTRAMUSCULAR; INTRAVENOUS
Status: COMPLETED | OUTPATIENT
Start: 2020-08-10 | End: 2020-08-11

## 2020-08-10 RX ORDER — TALC
6 POWDER (GRAM) TOPICAL NIGHTLY PRN
Status: DISCONTINUED | OUTPATIENT
Start: 2020-08-10 | End: 2020-08-10 | Stop reason: HOSPADM

## 2020-08-10 RX ORDER — FAMOTIDINE 10 MG/ML
INJECTION INTRAVENOUS
Status: DISCONTINUED | OUTPATIENT
Start: 2020-08-10 | End: 2020-08-10

## 2020-08-10 RX ORDER — NICARDIPINE HYDROCHLORIDE 0.2 MG/ML
INJECTION INTRAVENOUS
Status: DISCONTINUED | OUTPATIENT
Start: 2020-08-10 | End: 2020-08-10

## 2020-08-10 RX ORDER — MORPHINE SULFATE 2 MG/ML
2 INJECTION, SOLUTION INTRAMUSCULAR; INTRAVENOUS
Status: DISCONTINUED | OUTPATIENT
Start: 2020-08-10 | End: 2020-08-12 | Stop reason: HOSPADM

## 2020-08-10 RX ORDER — FELODIPINE 2.5 MG/1
2.5 TABLET, EXTENDED RELEASE ORAL NIGHTLY
Status: DISCONTINUED | OUTPATIENT
Start: 2020-08-10 | End: 2020-08-12 | Stop reason: HOSPADM

## 2020-08-10 RX ORDER — MIDAZOLAM HYDROCHLORIDE 1 MG/ML
1 INJECTION INTRAMUSCULAR; INTRAVENOUS EVERY 5 MIN PRN
Status: DISCONTINUED | OUTPATIENT
Start: 2020-08-10 | End: 2020-08-10 | Stop reason: HOSPADM

## 2020-08-10 RX ORDER — LIDOCAINE HYDROCHLORIDE 20 MG/ML
INJECTION INTRAVENOUS
Status: DISCONTINUED | OUTPATIENT
Start: 2020-08-10 | End: 2020-08-10

## 2020-08-10 RX ORDER — METHOCARBAMOL 500 MG/1
500 TABLET, FILM COATED ORAL ONCE
Status: COMPLETED | OUTPATIENT
Start: 2020-08-10 | End: 2020-08-10

## 2020-08-10 RX ORDER — ASPIRIN 81 MG/1
81 TABLET ORAL 2 TIMES DAILY
Status: DISCONTINUED | OUTPATIENT
Start: 2020-08-10 | End: 2020-08-12 | Stop reason: HOSPADM

## 2020-08-10 RX ADMIN — LIDOCAINE HYDROCHLORIDE 60 MG: 20 INJECTION, SOLUTION INTRAVENOUS at 02:08

## 2020-08-10 RX ADMIN — SODIUM CHLORIDE: 0.9 INJECTION, SOLUTION INTRAVENOUS at 11:08

## 2020-08-10 RX ADMIN — PREGABALIN 75 MG: 75 CAPSULE ORAL at 10:08

## 2020-08-10 RX ADMIN — TRANEXAMIC ACID 1000 MG: 100 INJECTION, SOLUTION INTRAVENOUS at 02:08

## 2020-08-10 RX ADMIN — METHOCARBAMOL TABLETS 500 MG: 500 TABLET, COATED ORAL at 05:08

## 2020-08-10 RX ADMIN — Medication 5 MG: at 04:08

## 2020-08-10 RX ADMIN — Medication 10 MG: at 02:08

## 2020-08-10 RX ADMIN — FENTANYL CITRATE 25 MCG: 50 INJECTION INTRAMUSCULAR; INTRAVENOUS at 05:08

## 2020-08-10 RX ADMIN — OXYCODONE HYDROCHLORIDE 5 MG: 5 TABLET ORAL at 08:08

## 2020-08-10 RX ADMIN — DOXAZOSIN 1 MG: 1 TABLET ORAL at 08:08

## 2020-08-10 RX ADMIN — CEFAZOLIN 2 G: 330 INJECTION, POWDER, FOR SOLUTION INTRAMUSCULAR; INTRAVENOUS at 11:08

## 2020-08-10 RX ADMIN — DEXAMETHASONE SODIUM PHOSPHATE 8 MG: 4 INJECTION, SOLUTION INTRAMUSCULAR; INTRAVENOUS at 02:08

## 2020-08-10 RX ADMIN — Medication 5 MG: at 02:08

## 2020-08-10 RX ADMIN — ACETAMINOPHEN 1000 MG: 500 TABLET ORAL at 11:08

## 2020-08-10 RX ADMIN — FAMOTIDINE 20 MG: 10 INJECTION, SOLUTION INTRAVENOUS at 02:08

## 2020-08-10 RX ADMIN — CEFAZOLIN 2 G: 330 INJECTION, POWDER, FOR SOLUTION INTRAMUSCULAR; INTRAVENOUS at 02:08

## 2020-08-10 RX ADMIN — ASPIRIN 81 MG: 81 TABLET, COATED ORAL at 08:08

## 2020-08-10 RX ADMIN — DOCUSATE SODIUM 50MG AND SENNOSIDES 8.6MG 1 TABLET: 8.6; 5 TABLET, FILM COATED ORAL at 08:08

## 2020-08-10 RX ADMIN — PREGABALIN 75 MG: 75 CAPSULE ORAL at 08:08

## 2020-08-10 RX ADMIN — SODIUM CHLORIDE: 0.9 INJECTION, SOLUTION INTRAVENOUS at 05:08

## 2020-08-10 RX ADMIN — ONDANSETRON 4 MG: 2 INJECTION INTRAMUSCULAR; INTRAVENOUS at 03:08

## 2020-08-10 RX ADMIN — FELODIPINE 2.5 MG: 2.5 TABLET, EXTENDED RELEASE ORAL at 08:08

## 2020-08-10 RX ADMIN — ACETAMINOPHEN 1000 MG: 500 TABLET ORAL at 06:08

## 2020-08-10 RX ADMIN — LABETALOL HYDROCHLORIDE 100 MG: 100 TABLET, FILM COATED ORAL at 08:08

## 2020-08-10 RX ADMIN — CARBOXYMETHYLCELLULOSE SODIUM 2 DROP: 10 GEL OPHTHALMIC at 02:08

## 2020-08-10 RX ADMIN — ROPIVACAINE HYDROCHLORIDE 4 ML/HR: 2 INJECTION, SOLUTION EPIDURAL; INFILTRATION at 05:08

## 2020-08-10 RX ADMIN — OXYCODONE HYDROCHLORIDE 5 MG: 5 TABLET ORAL at 05:08

## 2020-08-10 RX ADMIN — VANCOMYCIN HYDROCHLORIDE 1000 MG: 1 INJECTION, POWDER, LYOPHILIZED, FOR SOLUTION INTRAVENOUS at 10:08

## 2020-08-10 RX ADMIN — ESMOLOL HYDROCHLORIDE 30 MG: 10 INJECTION INTRAVENOUS at 02:08

## 2020-08-10 RX ADMIN — PROPOFOL 50 MCG/KG/MIN: 10 INJECTION, EMULSION INTRAVENOUS at 02:08

## 2020-08-10 RX ADMIN — SODIUM CHLORIDE, SODIUM GLUCONATE, SODIUM ACETATE, POTASSIUM CHLORIDE, MAGNESIUM CHLORIDE, SODIUM PHOSPHATE, DIBASIC, AND POTASSIUM PHOSPHATE: .53; .5; .37; .037; .03; .012; .00082 INJECTION, SOLUTION INTRAVENOUS at 03:08

## 2020-08-10 RX ADMIN — SODIUM CHLORIDE: 0.9 INJECTION, SOLUTION INTRAVENOUS at 10:08

## 2020-08-10 RX ADMIN — ALISKIREN 300 MG: 300 TABLET, FILM COATED ORAL at 08:08

## 2020-08-10 RX ADMIN — NICARDIPINE HYDROCHLORIDE 0.6 MG: 0.2 INJECTION, SOLUTION INTRAVENOUS at 02:08

## 2020-08-10 RX ADMIN — SODIUM CHLORIDE, SODIUM GLUCONATE, SODIUM ACETATE, POTASSIUM CHLORIDE, MAGNESIUM CHLORIDE, SODIUM PHOSPHATE, DIBASIC, AND POTASSIUM PHOSPHATE: .53; .5; .37; .037; .03; .012; .00082 INJECTION, SOLUTION INTRAVENOUS at 04:08

## 2020-08-10 RX ADMIN — MIDAZOLAM HYDROCHLORIDE 2 MG: 1 INJECTION, SOLUTION INTRAMUSCULAR; INTRAVENOUS at 01:08

## 2020-08-10 RX ADMIN — MUPIROCIN 1 G: 20 OINTMENT TOPICAL at 10:08

## 2020-08-10 RX ADMIN — MUPIROCIN 1 G: 20 OINTMENT TOPICAL at 08:08

## 2020-08-10 RX ADMIN — TRANEXAMIC ACID 1000 MG: 100 INJECTION, SOLUTION INTRAVENOUS at 03:08

## 2020-08-10 RX ADMIN — NICARDIPINE HYDROCHLORIDE 0.4 MG: 0.2 INJECTION, SOLUTION INTRAVENOUS at 04:08

## 2020-08-10 RX ADMIN — NICARDIPINE HYDROCHLORIDE 0.4 MG: 0.2 INJECTION, SOLUTION INTRAVENOUS at 03:08

## 2020-08-10 NOTE — PT/OT/SLP PROGRESS
Physical Therapy      Patient Name:  Gerda Lai   MRN:  [id number]    Patient not seen today secondary to patient being a late surgery and not appropriate for PT at this time. Will follow-up again in AM.    Mady Bearden, PT

## 2020-08-10 NOTE — ANESTHESIA PROCEDURE NOTES
Left adductor canal catheter    Patient location during procedure: pre-op   Block not for primary anesthetic.  Reason for block: at surgeon's request and post-op pain management   Post-op Pain Location: left knee  Start time: 8/10/2020 1:17 PM  Timeout: 8/10/2020 1:16 PM   End time: 8/10/2020 1:40 PM    Staffing  Authorizing Provider: Annemarie Terry MD  Performing Provider: Annemarie Wynne MD    Preanesthetic Checklist  Completed: patient identified, site marked, surgical consent, pre-op evaluation, timeout performed, IV checked, risks and benefits discussed and monitors and equipment checked  Peripheral Block  Patient position: supine  Prep: ChloraPrep and site prepped and draped  Patient monitoring: heart rate, cardiac monitor, continuous pulse ox, continuous capnometry and frequent blood pressure checks  Block type: adductor canal  Laterality: left  Injection technique: continuous  Needle  Needle type: Tuohy   Needle gauge: 17 G  Needle length: 3.5 in  Needle localization: anatomical landmarks and ultrasound guidance  Catheter type: spring wound  Catheter size: 19 G  Test dose: lidocaine 1.5% with Epi 1-to-200,000 and negative   -ultrasound image captured on disc.  Assessment  Injection assessment: negative aspiration, negative parasthesia and local visualized surrounding nerve  Paresthesia pain: none  Heart rate change: no  Slow fractionated injection: yes  Additional Notes  VSS.  [facility] RN monitoring vitals throughout procedure.  Patient tolerated procedure well.  Left adductor canal catheter placed. 10 ml of 0.25% ropivacaine + epi injected.

## 2020-08-10 NOTE — PLAN OF CARE
Pre-op complete, all questions answered, pt is stable and ready for next phase of care. Pt states she has a walker . Pt's belongings are in locker # 18. Walker in pre-op supply room.

## 2020-08-10 NOTE — PROGRESS NOTES
Spoke with Dr. Mcfarlane regarding pt's order for Celebrex 400mg. Pt's recent labs amanda eGFR 50.8. Per Dr. Mcfarlane, do not give ordered dose.. informed him pt also took tylenol 650mg at home at 7am. Per Dr. Mcfarlane do not give ordered dose

## 2020-08-10 NOTE — ANESTHESIA POSTPROCEDURE EVALUATION
Anesthesia Post Evaluation    Patient: Gerda Lai    Procedure(s) Performed: Procedure(s) (LRB):  ARTHROPLASTY, KNEE-ADE NEXGEN/CEMENTED TIBIA (Left)    Final Anesthesia Type: spinal    Patient location during evaluation: PACU  Patient participation: Yes- Able to Participate  Level of consciousness: awake and alert  Post-procedure vital signs: reviewed and stable  Pain management: adequate  Airway patency: patent    PONV status at discharge: No PONV  Anesthetic complications: no      Cardiovascular status: blood pressure returned to baseline  Respiratory status: unassisted  Hydration status: euvolemic  Follow-up not needed.          Vitals Value Taken Time   /79 08/10/20 1731   Temp 36.2 08/10/20 1734   Pulse 58 08/10/20 1734   Resp 18 08/10/20 1720   SpO2 97 % 08/10/20 1734   Vitals shown include unvalidated device data.      No case tracking events are documented in the log.      Pain/Ilda Score: Pain Rating Prior to Med Admin: 9 (8/10/2020  5:20 PM)  Pain Rating Post Med Admin: 8 (8/10/2020  5:10 PM)  Ilda Score: 10 (8/10/2020  5:10 PM)

## 2020-08-10 NOTE — ANESTHESIA PROCEDURE NOTES
Spinal    Diagnosis: L knee pain   Patient location during procedure: OR  Start time: 8/10/2020 2:19 PM  Timeout: 8/10/2020 2:17 PM  End time: 8/10/2020 2:21 PM    Staffing  Authorizing Provider: Annemarie Terry MD  Performing Provider: Annemarie Terry MD    Preanesthetic Checklist  Completed: patient identified, site marked, surgical consent, pre-op evaluation, timeout performed, IV checked, risks and benefits discussed and monitors and equipment checked  Spinal Block  Patient position: sitting  Prep: ChloraPrep  Patient monitoring: continuous pulse ox, cardiac monitor, heart rate, continuous capnometry and frequent blood pressure checks  Approach: midline  Location: L3-4  Injection technique: single shot  CSF Fluid: clear free-flowing CSF  Needle  Needle type: Quan   Needle gauge: 25 G  Needle length: 3.5 in  Additional Documentation: negative aspiration for heme and no paresthesia on injection  Needle localization: anatomical landmarks  Assessment  Sensory level: T9   Dermatomal levels determined by pinch or prick  Ease of block: easy  Patient's tolerance of the procedure: no complaints and comfortable throughout block  Additional Notes  3 cc PF 1.5 Mepiv IT

## 2020-08-10 NOTE — TRANSFER OF CARE
"Anesthesia Transfer of Care Note    Patient: Gerda Lai    Procedure(s) Performed: Procedure(s) (LRB):  ARTHROPLASTY, KNEE-ADE NEXGEN/CEMENTED TIBIA (Left)    Patient location: PACU    Anesthesia Type: general and regional    Transport from OR: Transported from OR on room air with adequate spontaneous ventilation. Transported from OR on 6-10 L/min O2 by face mask with adequate spontaneous ventilation    Post pain: adequate analgesia    Post assessment: no apparent anesthetic complications and tolerated procedure well    Post vital signs: stable    Level of consciousness: awake    Nausea/Vomiting: no nausea/vomiting    Complications: none    Transfer of care protocol was followed      Last vitals:   Visit Vitals  BP (!) 165/73 (BP Location: Left arm, Patient Position: Lying)   Pulse (!) 54   Temp 36.5 °C (97.7 °F) (Oral)   Resp (!) 25   Ht 5' 3" (1.6 m)   Wt 64 kg (141 lb)   SpO2 100%   Breastfeeding No   BMI 24.98 kg/m²     "

## 2020-08-10 NOTE — ANESTHESIA PREPROCEDURE EVALUATION
08/10/2020  Gerda Lai is a 87 y.o., female.    Anesthesia Evaluation    I have reviewed the Patient Summary Reports.    I have reviewed the Nursing Notes. I have reviewed the NPO Status.   I have reviewed the Medications.     Review of Systems  Anesthesia Hx:  No problems with previous Anesthesia  History of prior surgery of interest to airway management or planning: Previous anesthesia: General Denies Family Hx of Anesthesia complications.   Denies Personal Hx of Anesthesia complications.   Social:  Non-Smoker    Hematology/Oncology:  Hematology Normal       -- Cancer in past history:  Breast right axillary node dissection   EENT/Dental:EENT/Dental Normal   Cardiovascular:   Exercise tolerance: good Hypertension, poorly controlled hyperlipidemia    Pulmonary:   Pneumonia    Renal/:   Chronic Renal Disease, CRI    Hepatic/GI:  Hepatic/GI Normal    Musculoskeletal:   Arthritis   Spine Disorders:    Neurological:   Neuromuscular Disease, Fibromyalgia   Endocrine:  Endocrine Normal    Dermatological:   SCC   Psych:  Psychiatric Normal           Physical Exam  General:  Well nourished    Airway/Jaw/Neck:  Airway Findings: Mouth Opening: Small, but > 3cm Tongue: Normal  General Airway Assessment: Adult, Average  Mallampati: II  Improves to II with phonation.  TM Distance: 4 - 6 cm        Eyes/Ears/Nose:  EYES/EARS/NOSE FINDINGS: Normal   Dental:  Dental Findings: In tact, Molar caps   Chest/Lungs:  Chest/Lungs Findings: Clear to auscultation, Normal Respiratory Rate     Heart/Vascular:  Heart Findings: Rate: Normal  Rhythm: Regular Rhythm  Sounds: Normal  Heart murmur: negative Vascular Findings: Normal    Abdomen:  Abdomen Findings: Normal    Musculoskeletal:  Musculoskeletal Findings: Normal   Skin:  Skin Findings: Normal    Mental Status:  Mental Status Findings:  Cooperative, Alert and Oriented          Anesthesia Plan  Type of Anesthesia, risks & benefits discussed:  Anesthesia Type:  CSE, epidural, spinal, general  Patient's Preference:   Intra-op Monitoring Plan: standard ASA monitors  Intra-op Monitoring Plan Comments:   Post Op Pain Control Plan: peripheral nerve block  Post Op Pain Control Plan Comments:   Induction:   IV  Beta Blocker:  Patient is on a Beta-Blocker and has received one dose within the past 24 hours (No further documentation required).       Informed Consent: Patient understands risks and agrees with Anesthesia plan.  Questions answered. Anesthesia consent signed with patient.  ASA Score: 3     Day of Surgery Review of History & Physical:            Ready For Surgery From Anesthesia Perspective.

## 2020-08-10 NOTE — OP NOTE
[facility]ara Left TKA  OPERATIVE NOTE    Date of Operation:   8/10/2020    Providers Performing:   Surgeon(s):  Kalin Pacheco MD  Assistant: Tad Zelaya MD    Operative Procedure:   Left Total Knee Arthroplasty, CPT 15642    Preoperative Diagnosis:   Left Knee Osteoarthritis, ICD-10 M17.12    Postoperative Diagnosis:   SAME    Components Used:     Implant Name Type Inv. Item Serial No.  Lot No. LRB No. Used Action   CEMENT BONE SMPLX HV GENTMYCN - [id number]  CEMENT BONE SMPLX HV GENTMYCN  "[employer] [id number] Left 2 Implanted   TIBIAL INSERT POST SZ CD 3-4 1 - [id number]  TIBIAL INSERT POST SZ CD 3-4 1  ADE,INC [id number] Left 1 Implanted   TIBIAL NEXGEN PRECOAT STEM - [id number]  TIBIAL NEXGEN PRECOAT STEM  ADE,INC [id number] Left 1 Implanted   FEMORAL POST STBLZD SZ D LEFT - [id number]  FEMORAL POST STBLZD SZ D LEFT  ADE,INC [id number] Left 1 Implanted   PLUG TAPER - [id number]  PLUG TAPER  ADE,INC [id number] Left 1 Implanted       Anesthesia:   Spinal  Adductor canal block with catheter  JACKELYN cocktail:     Estimated Blood Loss:   200 cc    Specimens:   None    Complications:   None    INDICATIONS FOR PROCEDURE: Patient has persistent daily pain in the left knee with ADLs and night pain interfering with sleep. After failure of conservative management, requests total knee replacement. Patient understands the risks of infection, bleeding, blood clots, other complications, and wishes to proceed.   PROCEDURE IN DETAIL: After the induction of satisfactory anesthesia, the patient's left lower extremity was prepped and draped in the usual sterile fashion with the tourniquet cuff in place. The extremity was exsanguinated with an Esmarch bandage, and the tourniquet inflated. An anterior midline incision was made, and the dissection was carried sharply through the subcutaneous tissues and prepatellar bursa layer which was reflected medially. A modified medial parapatellar arthrotomy was performed,  and the patella was subluxated laterally. There was a large amount of clear joint fluid. Most of the fat pad was excised, and the medial release was completed around to the mid coronal point. A drill hole was made in the distal femur, and the canal was suctioned. The sword was placed in 5 degrees of valgus. The distal femoral cut was made. These wafers of bone were removed, and the PCL and ACL were released with the Bovie. The knee was hyperflexed, and a drill hole was made in the footprint of the ACL. The canal was suctioned. An intramedullary alignment salvatore was placed with good purchase in the isthmus. The upper tibial cut was made perpendicular in both planes. This wafer of bone was excised, and the extension gap was checked and found to be symmetric and satisfactory. The femur was sized, and the secondary femoral cuts were made in slight external rotation. The flexion gap was checked after removing cruciate and meniscal remnants, and was found to be satisfactory with a spacer block. Flexion and extension gaps were symmetric after releases of the tibiofemoral joint laterally for this valgus knee. The femoral finishing guide was used, and trials were inserted. We used a tibial baseplate template which gave us good coverage. The rotational position of it was marked with the Bovie. The patella was measured with a caliper and not cut in view of the patient's age, normal patellar morphology, and excellent tracking of the native patella. The trials were removed, and the tibia was prepared. The bone was cleaned with pulsatile lavage and dried thoroughly. The components were impacted in the usual fashion with Simplex HV cement. Patellar tracking was central without lateral release. A patellaplasty was performed. The knee was irrigated with 3 liters of pulsatile lavage.  The arthrotomy was repaired with interrupted and running figure-of-eight sutures of #1 Vicryl. The prepatellar bursa tissue was closed with running 2-0  Vicryl. The skin was closed with interrupted, inverted 2-0 Vicryl, 3-0 Monocryl, and staples. A Manuel dressing was applied, and the patient was taken to the PACU in stable condition without apparent orthopedic or anesthetic complications.    The first assistant was critical to all steps of the operation, including retraction and leg stabilization during exposure and bone preparation, as well as the deep and superficial wound closure.  Dr. Pacheco performed and/or supervised the key portions of this surgical procedure, including evaluation of the bone cuts, reshaping of the bony elements, and insertion of the provisional and final components.    Postoperative Plan:  The patient will be anticoagulated with 81mg aspirin twice daily for one month.   They will receive 24 hours of post-operative antibiotics.    Activity will be weight bearing as tolerated.        Signed by: Kalin Pacheco MD

## 2020-08-10 NOTE — ASSESSMENT & PLAN NOTE
87 y.o. female s/p L TKA 8/10/20    VS:  Some HTN, otherwise stable  Nerve block:  Adductor PNC, periop spinal  PT/OT:  WBAT  DVT PPx:  ASA 81 BID  Cultures:  none  Abx:  Postop Ancef  Labs:  none  Drain:  none  [place]:  none    Dispo: DC to assisted living vs SNF; medically ready

## 2020-08-10 NOTE — PLAN OF CARE
VSS.  Patient tolerating oral liquids without difficulty.   No apparent s&s of distress noted at this time, no complaints voiced at this time.   Transfer instructions reviewed with patient/daughter with good verbal feedback received. Polar care in place, patient has walker.   Patient ready for transfer.

## 2020-08-11 PROCEDURE — 97535 SELF CARE MNGMENT TRAINING: CPT

## 2020-08-11 PROCEDURE — 99212 PR OFFICE/OUTPT VISIT, EST, LEVL II, 10-19 MIN: ICD-10-PCS | Mod: ,,, | Performed by: ANESTHESIOLOGY

## 2020-08-11 PROCEDURE — 99900035 HC TECH TIME PER 15 MIN (STAT)

## 2020-08-11 PROCEDURE — 99212 OFFICE O/P EST SF 10 MIN: CPT | Mod: ,,, | Performed by: ANESTHESIOLOGY

## 2020-08-11 PROCEDURE — 25000003 PHARM REV CODE 250: Performed by: NURSE PRACTITIONER

## 2020-08-11 PROCEDURE — 94761 N-INVAS EAR/PLS OXIMETRY MLT: CPT

## 2020-08-11 PROCEDURE — 97110 THERAPEUTIC EXERCISES: CPT

## 2020-08-11 PROCEDURE — 25000003 PHARM REV CODE 250: Performed by: STUDENT IN AN ORGANIZED HEALTH CARE EDUCATION/TRAINING PROGRAM

## 2020-08-11 PROCEDURE — 97165 OT EVAL LOW COMPLEX 30 MIN: CPT

## 2020-08-11 PROCEDURE — 97116 GAIT TRAINING THERAPY: CPT | Mod: 59

## 2020-08-11 PROCEDURE — 97161 PT EVAL LOW COMPLEX 20 MIN: CPT

## 2020-08-11 PROCEDURE — 94799 UNLISTED PULMONARY SVC/PX: CPT

## 2020-08-11 PROCEDURE — 97530 THERAPEUTIC ACTIVITIES: CPT

## 2020-08-11 PROCEDURE — 63600175 PHARM REV CODE 636 W HCPCS: Performed by: ANESTHESIOLOGY

## 2020-08-11 PROCEDURE — 63600175 PHARM REV CODE 636 W HCPCS: Performed by: NURSE PRACTITIONER

## 2020-08-11 RX ADMIN — ACETAMINOPHEN 1000 MG: 500 TABLET ORAL at 11:08

## 2020-08-11 RX ADMIN — DOCUSATE SODIUM 50MG AND SENNOSIDES 8.6MG 1 TABLET: 8.6; 5 TABLET, FILM COATED ORAL at 09:08

## 2020-08-11 RX ADMIN — ALISKIREN 300 MG: 300 TABLET, FILM COATED ORAL at 08:08

## 2020-08-11 RX ADMIN — ACETAMINOPHEN 1000 MG: 500 TABLET ORAL at 06:08

## 2020-08-11 RX ADMIN — LABETALOL HYDROCHLORIDE 100 MG: 100 TABLET, FILM COATED ORAL at 09:08

## 2020-08-11 RX ADMIN — ROPIVACAINE HYDROCHLORIDE 4 ML/HR: 2 INJECTION, SOLUTION EPIDURAL; INFILTRATION at 04:08

## 2020-08-11 RX ADMIN — CEFAZOLIN 2 G: 330 INJECTION, POWDER, FOR SOLUTION INTRAMUSCULAR; INTRAVENOUS at 06:08

## 2020-08-11 RX ADMIN — FELODIPINE 2.5 MG: 2.5 TABLET, EXTENDED RELEASE ORAL at 09:08

## 2020-08-11 RX ADMIN — MUPIROCIN 1 G: 20 OINTMENT TOPICAL at 08:08

## 2020-08-11 RX ADMIN — ASPIRIN 81 MG: 81 TABLET, COATED ORAL at 08:08

## 2020-08-11 RX ADMIN — DOXAZOSIN 1 MG: 1 TABLET ORAL at 09:08

## 2020-08-11 RX ADMIN — PRAVASTATIN SODIUM 40 MG: 20 TABLET ORAL at 08:08

## 2020-08-11 RX ADMIN — DOCUSATE SODIUM 50MG AND SENNOSIDES 8.6MG 1 TABLET: 8.6; 5 TABLET, FILM COATED ORAL at 08:08

## 2020-08-11 RX ADMIN — PREGABALIN 75 MG: 75 CAPSULE ORAL at 09:08

## 2020-08-11 RX ADMIN — LORAZEPAM 0.25 MG: 0.5 TABLET ORAL at 10:08

## 2020-08-11 RX ADMIN — ASPIRIN 81 MG: 81 TABLET, COATED ORAL at 09:08

## 2020-08-11 RX ADMIN — POLYETHYLENE GLYCOL 3350 17 G: 17 POWDER, FOR SOLUTION ORAL at 08:08

## 2020-08-11 RX ADMIN — PANTOPRAZOLE SODIUM 40 MG: 40 TABLET, DELAYED RELEASE ORAL at 08:08

## 2020-08-11 RX ADMIN — LABETALOL HYDROCHLORIDE 100 MG: 100 TABLET, FILM COATED ORAL at 08:08

## 2020-08-11 RX ADMIN — ACETAMINOPHEN 1000 MG: 500 TABLET ORAL at 05:08

## 2020-08-11 NOTE — PT/OT/SLP EVAL
"Physical Therapy Evaluation and Treatment     Patient Name:  Gerda Lai   MRN:  [id number]    Recommendations:     Discharge Recommendations:  home health PT   Discharge Equipment Recommendations: bedside commode   Barriers to discharge: Decreased caregiver support - lives alone     Assessment:     Gerda Lai is a 87 y.o. female admitted with a medical diagnosis of Primary osteoarthritis of left knee.  She presents with the following impairments/functional limitations:  weakness, impaired functional mobilty, gait instability, impaired balance, decreased lower extremity function, pain, decreased ROM, impaired skin, impaired joint extensibility, orthopedic precautions. Patient tolerated PT session fairly well. She ambulated 100ft x2 trials with RW and SBA. No LOB or SOB noted. She ascended/descended 1 6" curb step with RW and CGA. She performed L LE therex 2x10. She would continue to benefit from PT in order to get back to PLOF.    Rehab Prognosis: Good; patient would benefit from acute skilled PT services to address these deficits and reach maximum level of function.    Recent Surgery: Procedure(s) (LRB):  ARTHROPLASTY, KNEE-ADE NEXGEN/CEMENTED TIBIA (Left) 1 Day Post-Op    Plan:     During this hospitalization, patient to be seen daily to address the identified rehab impairments via gait training, therapeutic activities, therapeutic exercises and progress toward the following goals:    · Plan of Care Expires:  08/14/20    Subjective     Chief Complaint: Will not have help at home.   Patient/Family Comments/goals: To walk without pain and not have to use her cane.   Pain/Comfort:  · Pain Rating 1: 0/10    Living Environment:  Patient lives alone in a [facility] with 1 step to enter. Prior to admission, patients level of function was modified independent with hurrycane.  Equipment used at home: walker, rolling, rollator, hurrycane. She still drives. Upon discharge, patient will have assistance from ?. Patient " "assumed that she was going to stay at [facility] to have help at home prior to discharge.     Objective:     Communicated with RN prior to session.  Patient found supine with cryotherapy, FCD, perineural catheter  upon PT entry to room.    General Precautions: Standard, fall   Orthopedic Precautions:LLE weight bearing as tolerated   Braces: N/A     Exams:  · Cognitive Exam:  Patient is oriented to Person, Place, Time and Situation  · Sensation:    · -       Intact to B LE's  · RLE ROM: WFL  · RLE Strength: WFL  · LLE ROM: WFL at ankle and hip but limited at knee due to pain  · LLE Strength: grossly 3+/5 but did not formally assess due to recent surgery     Functional Mobility:  · Bed Mobility:     · Scooting: stand by assistance  · Supine to Sit: stand by assistance  · Mat Mobility:    · Supine to Sit: stand by assistance  · Sit to Supine: stand by assistance  · Transfers:     · Sit to Stand:  contact guard assistance with rolling walker x1 from bed, x2 from wheelchair, and x1 from mat with verbal cues for hand placement   · Gait: Patient ambulated 100ft x2 trials with Rolling Walker and stand by assistance using 3-point gait. Patient demonstrated decreased rupinder and decreased step length during gait due to impaired balance, decreased ROM and decreased strength. Verbal cues provided for gait sequence and RW management.   · Stairs:  Pt ascended/descended 6" curb step with Rolling Walker and Contact Guard Assistance. Verbal cues provided for technique.     Therapeutic Activities and Exercises:  Patient educated in and performed L LE exercises 2x10 for ankle pumps, quad set, glute set, SAQ over bolster, heel slides, hip abd/add, SLR, and LAQ.    Patient educated in:  -PT role and POC  -safety with transfers including hand placement  -gait sequencing and RW management  -OOB activity to maximize recovery including ambulating at home to prevent DVT   -SUV transfer  -curb training  -HEP for therex at " home with handout provided   -polar ice use and management       AM-PAC 6 CLICK MOBILITY  Total Score:17     Patient left up in chair with all lines intact, call button in reach, and RN notified.    GOALS:   Multidisciplinary Problems     Physical Therapy Goals        Problem: Physical Therapy Goal    Goal Priority Disciplines Outcome Goal Variances Interventions   Physical Therapy Goal     PT, PT/OT Ongoing, Progressing     Description: Goals to be met by: 2020     Patient will increase functional independence with mobility by performin. Supine to sit with supervision  2. Sit to stand transfer with supervision  3. Gait x300 feet with Supervision using Rolling Walker  4. Ascend/Descend 6 inch curb step with Stand-by Assistance using Rolling Walker  5. Lower extremity exercise program x30 reps per handout, with supervision                     History:     Past Medical History:   Diagnosis Date    Anxiety     Arthritis     Basal cell carcinoma 2016    right post auricular neck     Breast cancer     right    Cataract     Fibromyalgia     History of measles as a child     Hyperlipidemia     Hypertension     Personal history of colonic polyps     Pneumonia     SCC (squamous cell carcinoma)     R chest    SCC (squamous cell carcinoma)     left medial shoulder    SCC (squamous cell carcinoma) 2017    left knee    Shingles     Squamous cell carcinoma 2015    right forearm    Thyroid disease     Vaginitis        Past Surgical History:   Procedure Laterality Date    ADENOIDECTOMY      BREAST BIOPSY Left     Excisional bx, benign    BREAST LUMPECTOMY Right     DCIS    CATARACT EXTRACTION W/  INTRAOCULAR LENS IMPLANT Bilateral     EYE SURGERY      JOINT REPLACEMENT Right     ELZBIETA    KNEE ARTHROPLASTY Left 8/10/2020    Procedure: ARTHROPLASTY, KNEE-ADE NEXGEN/CEMENTED TIBIA;  Surgeon: Kalin Pacheco MD;  Location: [facility];  Service: Orthopedics;  Laterality: Left;     thyriodectomy      partial    tonsillectomy      TONSILLECTOMY      TOTAL HIP ARTHROPLASTY      right    Yag  Left        Time Tracking:     PT Received On: 08/11/20  PT Start Time: 0800     PT Stop Time: 0848  PT Total Time (min): 48 min     Billable Minutes: Evaluation  10, Gait Training 15, Therapeutic Activity 8, and Therapeutic Exercise 15      Mady Bearden, PT  08/11/2020

## 2020-08-11 NOTE — PROGRESS NOTES
Acute Pain Service and Perioperative Surgical Home Progress Note    HPI  Gerda Lai is a 87 y.o., female,     Interval history      Surgery:  Procedure(s) (LRB):  ARTHROPLASTY, KNEE-ADE NEXGEN/CEMENTED TIBIA (Left)    Post Op Day #: 1    Catheter type: Perineural Adductor Canal    Infusion type: Ropivacaine 0.2%  7 mL Q3 hours; 5mL Q30 min PRN    Problem List:    Active Hospital Problems    Diagnosis  POA    *Primary osteoarthritis of left knee s/p TKA 8/10/20 [M17.12]  Yes    Macrocytic anemia [D53.9]  Yes    Carotid arterial disease [I73.9]  Yes    Anxiety [F41.9]  Yes     Chronic    Subclavian artery stenosis, right [I77.1]  Yes    Aortic atherosclerosis [I70.0]  Yes     Chronic     On CXR 5/12/18      HLD (hyperlipidemia) [E78.5]  Yes     Chronic    CKD (chronic kidney disease), stage III [N18.3]  Yes     Chronic    Hypertension, essential [I10]  Yes     Chronic     Note that she has an auscultatory gap.  BP is difficult to control and she has been intolerant to several meds.  BP goal is <150/90        Resolved Hospital Problems   No resolved problems to display.       Subjective:       General appearance of alert, oriented, no complaints   Pain with rest: 4    Numbers   Pain with movement: 4    Numbers   Side Effects    1. Pruritis No    2. Nausea No    3. Motor Blockade No    4. Sedation No, 1=awake and alert    Schedule Medications:    acetaminophen  1,000 mg Oral Q6H    aliskiren  300 mg Oral QHS    aspirin  81 mg Oral BID    ceFAZolin (ANCEF) IVPB  2 g Intravenous Q8H    doxazosin  1 mg Oral QHS    felodipine  2.5 mg Oral QHS    labetaloL  100 mg Oral Q12H    mupirocin  1 g Nasal BID    pantoprazole  40 mg Oral Daily    polyethylene glycol  17 g Oral Daily    pravastatin  40 mg Oral Daily    pregabalin  75 mg Oral QHS    senna-docusate 8.6-50 mg  1 tablet Oral BID        Continuous Infusions:   sodium chloride 0.9% 150 mL/hr at 08/10/20 2303    ropivacaine (PF) 2 mg/ml  (0.2%) 4 mL/hr (08/10/20 1706)        PRN Medications:  albuterol, bisacodyL, LORazepam, morphine, naloxone, ondansetron, oxyCODONE, oxyCODONE, promethazine (PHENERGAN) IVPB       Antibiotics:  Antibiotics (From admission, onward)    Start     Stop Route Frequency Ordered    08/10/20 2230  ceFAZolin injection 2 g  (MEDICATIONS - ANTIBIOTICS NO PENICILLIN ALLERGY TOTAL KNEE PATHWAY POST-OP)      08/11 1429 IV Every 8 hours (non-standard times) 08/10/20 1622    08/10/20 2100  mupirocin 2 % ointment 1 g      08/15 2059 Nasl 2 times daily 08/10/20 1856             Objective:     Catheter site clean, dry, intact          Vital Signs (Most Recent):  Temp: 97 °F (36.1 °C) (08/11/20 0030)  Pulse: (!) 58 (08/11/20 0030)  Resp: 18 (08/11/20 0038)  BP: (!) 150/67 (08/11/20 0030)  SpO2: 99 % (08/11/20 0038) Vital Signs Range (Last 24H):  Temp:  [97 °F (36.1 °C)-98.1 °F (36.7 °C)]   Pulse:  [50-63]   Resp:  [15-27]   BP: (106-189)/()   SpO2:  [95 %-100 %]          I & O (Last 24H):    Intake/Output Summary (Last 24 hours) at 8/11/2020 0308  Last data filed at 8/10/2020 1852  Gross per 24 hour   Intake 2500 ml   Output --   Net 2500 ml       Physical Exam:    GA: Alert, comfortable, no acute distress.   Pulmonary: Clear to auscultation A/P/L. No wheezing, crackles, or rhonchi.  Cardiac: RRR S1 & S2 w/o rubs/murmurs/gallops.   Abdominal:Bowel sounds present. No tenderness to palpation or distension. No appreciable hepatosplenomegaly.   Skin: No jaundice, rashes, or visible lesions.         Laboratory:  CBC: No results for input(s): WBC, RBC, HGB, HCT, PLT, MCV, MCH, MCHC in the last 72 hours.    BMP: No results for input(s): NA, K, CO2, CL, BUN, CREATININE, GLU, MG, PHOS, CALCIUM in the last 72 hours.     @LABRCNT(PT:3,INR:3,PROTIME:3,aptt:3)      Anticoagulant dose ASA at 81mg BID.    Assessment:         Pain control adequate    Plan:     1) Pain:    Adductor canal perineural catheter in place and infusing. Good level.  Multimodal pain regimen with acetaminophen, Lyrica, and prn oxycodone given  Will continue to monitor. Plan to discharge with Chandan on POD #1.   2) HTN: Continue home regimen of aliskiren, doxazosin, labetalol   3) CKD: Hold NSAIDs   4) FEN/GI: Tolerating liquids, advance diet as tolerated. Denies flatus. Denies BM.   5) Dispo: Pt working well with PT/OT. Case management and SW for placement. Plan for discharge POD #1.        Evaluator Wanda Love PA-C

## 2020-08-11 NOTE — PLAN OF CARE
[facility] HOME HEALTH ORDERS  FACE TO FACE ENCOUNTER    Patient Name: Gerda Lai  YOB: 1933    PCP: Tomas Beltran MD   PCP Address: [address] [facility] [address]  PCP Phone Number: [phone]  PCP Fax: [phone]    Encounter Date: 08/11/2020    Admit to Home Health    Diagnoses:  Active Hospital Problems    Diagnosis  POA    *Primary osteoarthritis of left knee s/p TKA 8/10/20 [M17.12]  Yes    Macrocytic anemia [D53.9]  Yes    Carotid arterial disease [I73.9]  Yes    Anxiety [F41.9]  Yes     Chronic    Subclavian artery stenosis, right [I77.1]  Yes    Aortic atherosclerosis [I70.0]  Yes     Chronic     On CXR 5/12/18      HLD (hyperlipidemia) [E78.5]  Yes     Chronic    CKD (chronic kidney disease), stage III [N18.3]  Yes     Chronic    Hypertension, essential [I10]  Yes     Chronic     Note that she has an auscultatory gap.  BP is difficult to control and she has been intolerant to several meds.  BP goal is <150/90        Resolved Hospital Problems   No resolved problems to display.       Future Appointments   Date Time Provider Department Center   8/12/2020  2:30 PM Ebenezer Sanchez, PT [facility]   8/24/2020  2:00 PM Stacy Harris NP [facility]     Follow-up Information     Stacy Harris NP On 8/24/2020.    Specialty: Orthopedic Surgery  Contact information:  [address]  [facility] [id number] [phone]                     I have seen and examined this patient face to face today. My clinical findings that support the need for the home health skilled services and home bound status are the following:  Weakness/numbness causing balance and gait disturbance due to Joint Replacement making it taxing to leave home.    Allergies:  Review of patient's allergies indicates:   Allergen Reactions    Sulfa (sulfonamide antibiotics) Rash    Clarithromycin Other (See Comments)     Weak, extreme fatigue. dizziness     Flexeril [cyclobenzaprine] Other (See Comments)     Dizziness    Iodine and iodide containing products     Lisinopril Other (See Comments)     Cough and sensation of throat swelling/?angioedema    Losartan Rash    Metoprolol Swelling     Tightness in throat    Tramadol Other (See Comments)     Dizzy and weak    Verapamil (bulk) Palpitations    Voltaren [diclofenac sodium] Other (See Comments)     Drops blood pressure       Diet: regular diet    Activities: activity as tolerated    Home Health Admitting Clinician:   SN/PT to complete comprehensive assessment including routine vital signs. Instruct on disease process and s/s of complications to report to MD. Follow specific home health arthoplasty protocol. Review/verify medication list sent home with the patient at time of discharge  and instruct patient/caregiver as needed. If coumadin ordered, coumadin clinic to manage INR with INR draws 2x per week with a goal to maintain INR between 1.8 and 2.2. Frequency may be adjusted depending on start of care date.    Notify MD if SBP > 160 or < 90; DBP > 90 or < 50; HR > 120 or < 50; Temp > 101    Home Medical Equipment:  Walker, 3-1 bedside commode, transfer tub bench    CONSULTS:    Physical Therapy may admit if patient not on coumadin, PT to perform comprehensive assessment if performing admit visit and generate therapy plan of care. Evaluate for home safety and equipment needs; Establish/upgrade home exercise program. Perform/instruct on therapeutic exercises, gait training, transfer training, and Range of Motion.      OTHER: (only select if patient needs other therapy disciplines)  Occupational Therapy to evaluate and treat. Evaluate home environment for safety and equipment needs. Perform/Instruct on transfers, ADL training, ROM, and therapeutic exercises.  Aide to provide assistance with personal care, ADLs, and vital signs.    MISCELLANEOUS CARE:  Routine Skin for Bedridden Patients: Instruct  patient/caregiver to apply moisture barrier cream to all skin folds and wet areas in perineal area daily and after baths and all bowel movements.    WOUND CARE ORDERS:  Assess Surgical Incision/DSRG each TX  Aquacel AG drsg applied post-op leave on 14 days post op. Call MD if any drainage reaches border to border of drsg horizontally, s/s of infection, temp >101, induration, swelling or redness.  If dressing is removed per MD order, then apply island dressing, change/teach caregiver to perform daily dressing change if island dressing present.    Medications: Review discharge medications with patient and family and provide education.      Current Discharge Medication List      CONTINUE these medications which have NOT CHANGED    Details   aliskiren (TEKTURNA) 300 MG Tab Take 1 tablet (300 mg total) by mouth once daily.  Qty: 90 tablet, Refills: 3      doxazosin (CARDURA) 1 MG tablet Take 1 tablet (1 mg total) by mouth once daily.  Qty: 90 tablet, Refills: 3    Associated Diagnoses: Hypertension, essential      felodipine (PLENDIL) 2.5 MG Tb24 TAKE ONE TABLET IN THE MORNING AND ONE TABLET IN THE EVENING  Qty: 180 tablet, Refills: 3    Associated Diagnoses: Hypertension, essential      glucosamine-chondroitin 500-400 mg tablet Take 1 tablet by mouth once daily.       LORazepam (ATIVAN) 0.5 MG tablet Take 0.5-1 tablets (0.25-0.5 mg total) by mouth every 12 (twelve) hours as needed for Anxiety.  Qty: 30 tablet, Refills: 0    Associated Diagnoses: Hypertension, essential      multivitamin capsule Take 1 capsule by mouth once daily.      omeprazole (PRILOSEC OTC) 20 MG tablet Take 20 mg by mouth once daily.        polymyxin B sulf-trimethoprim (POLYTRIM) 10,000 unit- 1 mg/mL Drop Place 1 drop into both eyes every 6 (six) hours.  Qty: 10 mL, Refills: 0    Associated Diagnoses: Bacterial conjunctivitis of both eyes      pravastatin (PRAVACHOL) 40 MG tablet Take 1 tablet (40 mg total) by mouth once daily.  Qty: 90 tablet,  Refills: 3    Associated Diagnoses: Hyperlipidemia      trolamine salicylate (ASPERCREME TOP) Apply topically.      TURMERIC ORAL Take 500 mg by mouth once daily.       acetaminophen (TYLENOL) 650 MG TbSR Take 1 tablet (650 mg total) by mouth every 8 (eight) hours as needed.  Qty: 120 tablet, Refills: 0    Associated Diagnoses: Status post total left knee replacement      albuterol (PROVENTIL/VENTOLIN HFA) 90 mcg/actuation inhaler Inhale 2 puffs into the lungs every 6 (six) hours as needed for Wheezing. Rescue  Qty: 1 Inhaler, Refills: 0    Associated Diagnoses: Acute bacterial bronchitis      aspirin (ECOTRIN) 81 MG EC tablet Take 1 tablet (81 mg total) by mouth 2 (two) times daily.  Qty: 60 tablet, Refills: 0    Associated Diagnoses: Status post total left knee replacement      coenzyme Q10 (CO Q-10) 100 mg capsule Take 100 mg by mouth once daily.      docusate sodium (COLACE) 100 MG capsule Take 1 capsule (100 mg total) by mouth 2 (two) times daily as needed for Constipation.  Qty: 60 capsule, Refills: 0    Associated Diagnoses: Status post total left knee replacement      ipratropium (ATROVENT) 0.03 % nasal spray 2 sprays by Nasal route 2 (two) times daily as needed.       oxyCODONE (ROXICODONE) 5 MG immediate release tablet Take 1-2 tabs by mouth every 4-6 hours as needed for pain  Qty: 30 tablet, Refills: 0    Comments: Greater than 7 day supply is medically necessary. Deliver to [place] for surgery 8/10/2020.  Associated Diagnoses: Status post total left knee replacement         STOP taking these medications       B INFANTIS/B ANI/B JOSE/B BIFID (PROBIOTIC 4X ORAL) Comments:   Reason for Stopping:         labetaloL (NORMODYNE) 100 MG tablet Comments:   Reason for Stopping:         meloxicam (MOBIC) 15 MG tablet Comments:   Reason for Stopping:               I certify that this patient is confined to her home and needs intermittent skilled nursing care, physical therapy and occupational therapy.

## 2020-08-11 NOTE — PLAN OF CARE
"Patient tolerated PT session fairly well. She ambulated 100ft x2 trials with RW and SBA. No LOB or SOB noted. She ascended/descended 1 6" curb step with RW and CGA. She performed L LE therex 2x10. She would continue to benefit from PT in order to get back to PLOF.    Problem: Physical Therapy Goal  Goal: Physical Therapy Goal  Description: Goals to be met by: 2020     Patient will increase functional independence with mobility by performin. Supine to sit with supervision  2. Sit to stand transfer with supervision  3. Gait x300 feet with Supervision using Rolling Walker  4. Ascend/Descend 6 inch curb step with Stand-by Assistance using Rolling Walker  5. Lower extremity exercise program x30 reps per handout, with supervision    Outcome: Ongoing, Progressing     "

## 2020-08-11 NOTE — PLAN OF CARE
08/11/20 1000   QUILES Message   Medicare Outpatient and Observation Notification regarding financial responsibility Given to patient/caregiver;Explained to patient/caregiver;Signed/date by patient/caregiver   Date QUILES was signed 08/11/20   Time QUILES was signed 0827

## 2020-08-11 NOTE — PLAN OF CARE
Problem: Occupational Therapy Goal  Goal: Occupational Therapy Goal  Description: Goals to be met by: 8-14-20     Patient will increase functional independence with ADLs by performing:    UE Dressing with Modified [place].  LE Dressing with Supervision.  Grooming while standing at sink with Supervision.  Toileting from toilet with Supervision for hygiene and clothing management.   Toilet transfer to toilet with Supervision.    Outcome: Ongoing, Progressing       Patient completed ADLs at stand by assistance during evaluation/session today.     Stacy Cheung, OT  8/11/2020

## 2020-08-11 NOTE — SUBJECTIVE & OBJECTIVE
Principal Problem:Primary osteoarthritis of left knee    Principal Orthopedic Problem: same    Interval History: Patient seen and examined at bedside.  No acute events overnight.  Pain controlled.  No PT yesterday.      Review of patient's allergies indicates:   Allergen Reactions    Sulfa (sulfonamide antibiotics) Rash    Clarithromycin Other (See Comments)     Weak, extreme fatigue. dizziness    Flexeril [cyclobenzaprine] Other (See Comments)     Dizziness    Iodine and iodide containing products     Lisinopril Other (See Comments)     Cough and sensation of throat swelling/?angioedema    Losartan Rash    Metoprolol Swelling     Tightness in throat    Tramadol Other (See Comments)     Dizzy and weak    Verapamil (bulk) Palpitations    Voltaren [diclofenac sodium] Other (See Comments)     Drops blood pressure       Current Facility-Administered Medications   Medication    0.9%  NaCl infusion    acetaminophen tablet 1,000 mg    albuterol inhaler 2 puff    aliskiren tablet 300 mg    aspirin EC tablet 81 mg    bisacodyL suppository 10 mg    doxazosin tablet 1 mg    felodipine 24 hr tablet 2.5 mg    labetaloL tablet 100 mg    LORazepam tablet 0.5 mg    morphine injection 2 mg    mupirocin 2 % ointment 1 g    naloxone 0.4 mg/mL injection 0.02 mg    ondansetron injection 4 mg    oxyCODONE immediate release tablet 10 mg    oxyCODONE immediate release tablet 5 mg    pantoprazole EC tablet 40 mg    polyethylene glycol packet 17 g    pravastatin tablet 40 mg    pregabalin capsule 75 mg    promethazine (PHENERGAN) 6.25 mg in dextrose 5 % 50 mL IVPB    ropivacaine (PF) 2 mg/ml (0.2%) infusion    ropivacaine 0.2% ON-Q C-BLOC 400 ML (SELECT A FLOW)    senna-docusate 8.6-50 mg per tablet 1 tablet     Objective:     Vital Signs (Most Recent):  Temp: 97.6 °F (36.4 °C) (08/11/20 0434)  Pulse: (!) 55 (08/11/20 0434)  Resp: 16(Simultaneous filing. User may not have seen previous data.) (08/11/20  "0434)  BP: (!) 149/70 (08/11/20 0434)  SpO2: 99 %(Simultaneous filing. User may not have seen previous data.) (08/11/20 0434) Vital Signs (24h Range):  Temp:  [97 °F (36.1 °C)-98.1 °F (36.7 °C)] 97.6 °F (36.4 °C)  Pulse:  [50-63] 55  Resp:  [15-27] 16  SpO2:  [95 %-100 %] 99 %  BP: (106-189)/() 149/70     Weight: 64 kg (141 lb)  Height: 5' 3" (160 cm)  Body mass index is 24.98 kg/m².      Intake/Output Summary (Last 24 hours) at 8/11/2020 0704  Last data filed at 8/10/2020 1852  Gross per 24 hour   Intake 2500 ml   Output --   Net 2500 ml       Ortho/SPM Exam  NAD  No increased WOB  Dressing c/d/i  SILT T/SP/DP  Motor intact T/DP  WWP extremities    Significant Labs: All pertinent labs within the past 24 hours have been reviewed.    Significant Imaging: I have reviewed all pertinent imaging results/findings.  "

## 2020-08-11 NOTE — PROGRESS NOTES
[facility]  Orthopedics  Progress Note    Patient Name: Gerda Lai  MRN: [id number]  Admission Date: 8/10/2020  Hospital Length of Stay: 0 days  Attending Provider: Kalin Pacheco MD  Primary Care Provider: Tomas Beltran MD  Follow-up For: Procedure(s) (LRB):  ARTHROPLASTY, KNEE-ADE NEXGEN/CEMENTED TIBIA (Left)    Post-Operative Day: 1 Day Post-Op  Subjective:     Principal Problem:Primary osteoarthritis of left knee    Principal Orthopedic Problem: same    Interval History: Patient seen and examined at bedside.  No acute events overnight.  Pain controlled.  No PT yesterday.      Review of patient's allergies indicates:   Allergen Reactions    Sulfa (sulfonamide antibiotics) Rash    Clarithromycin Other (See Comments)     Weak, extreme fatigue. dizziness    Flexeril [cyclobenzaprine] Other (See Comments)     Dizziness    Iodine and iodide containing products     Lisinopril Other (See Comments)     Cough and sensation of throat swelling/?angioedema    Losartan Rash    Metoprolol Swelling     Tightness in throat    Tramadol Other (See Comments)     Dizzy and weak    Verapamil (bulk) Palpitations    Voltaren [diclofenac sodium] Other (See Comments)     Drops blood pressure       Current Facility-Administered Medications   Medication    0.9%  NaCl infusion    acetaminophen tablet 1,000 mg    albuterol inhaler 2 puff    aliskiren tablet 300 mg    aspirin EC tablet 81 mg    bisacodyL suppository 10 mg    doxazosin tablet 1 mg    felodipine 24 hr tablet 2.5 mg    labetaloL tablet 100 mg    LORazepam tablet 0.5 mg    morphine injection 2 mg    mupirocin 2 % ointment 1 g    naloxone 0.4 mg/mL injection 0.02 mg    ondansetron injection 4 mg    oxyCODONE immediate release tablet 10 mg    oxyCODONE immediate release tablet 5 mg    pantoprazole EC tablet 40 mg    polyethylene glycol packet 17 g    pravastatin tablet 40 mg    pregabalin capsule 75 mg    promethazine  "(PHENERGAN) 6.25 mg in dextrose 5 % 50 mL IVPB    ropivacaine (PF) 2 mg/ml (0.2%) infusion    ropivacaine 0.2% ON-Q C-BLOC 400 ML (SELECT A FLOW)    senna-docusate 8.6-50 mg per tablet 1 tablet     Objective:     Vital Signs (Most Recent):  Temp: 97.6 °F (36.4 °C) (08/11/20 0434)  Pulse: (!) 55 (08/11/20 0434)  Resp: 16(Simultaneous filing. User may not have seen previous data.) (08/11/20 0434)  BP: (!) 149/70 (08/11/20 0434)  SpO2: 99 %(Simultaneous filing. User may not have seen previous data.) (08/11/20 0434) Vital Signs (24h Range):  Temp:  [97 °F (36.1 °C)-98.1 °F (36.7 °C)] 97.6 °F (36.4 °C)  Pulse:  [50-63] 55  Resp:  [15-27] 16  SpO2:  [95 %-100 %] 99 %  BP: (106-189)/() 149/70     Weight: 64 kg (141 lb)  Height: 5' 3" (160 cm)  Body mass index is 24.98 kg/m².      Intake/Output Summary (Last 24 hours) at 8/11/2020 0704  Last data filed at 8/10/2020 1852  Gross per 24 hour   Intake 2500 ml   Output --   Net 2500 ml       Ortho/SPM Exam  NAD  No increased WOB  Dressing c/d/i  SILT T/SP/DP  Motor intact T/DP  WWP extremities    Significant Labs: All pertinent labs within the past 24 hours have been reviewed.    Significant Imaging: I have reviewed all pertinent imaging results/findings.    Assessment/Plan:     * Primary osteoarthritis of left knee s/p TKA 8/10/20  87 y.o. female s/p L TKA 8/10/20    VS:  Some HTN, otherwise stable  Nerve block:  Adductor PNC, periop spinal  PT/OT:  WBAT  DVT PPx:  ASA 81 BID  Cultures:  none  Abx:  Postop Ancef  Labs:  none  Drain:  none  Worley:  none    Dispo: DC to assisted living vs SNF; medically ready      Macrocytic anemia  Stable     Carotid arterial disease  Stable     Anxiety  Home meds    Subclavian artery stenosis, right  stable    Aortic atherosclerosis  stable    CKD (chronic kidney disease), stage III  Avoid NSAIDs other than ASA    HLD (hyperlipidemia)  Home meds    Hypertension, essential  Home meds          Tad Zelaya MD  Orthopedics  [facility]

## 2020-08-11 NOTE — PT/OT/SLP EVAL
"Occupational Therapy   Evaluation    Name: Gerda Lai  MRN: [id number]  Admitting Diagnosis:  Primary osteoarthritis of left knee 1 Day Post-Op    Recommendations:     Discharge Recommendations: home health OT  Discharge Equipment Recommendations:  bedside commode  Barriers to discharge:  Decreased caregiver support    Assessment:     Gerda Lai is a 87 y.o. female with a medical diagnosis of Primary osteoarthritis of left knee.  Patient is s/p left TKA. Patient completed ADLs at stand by assistance with increased time. She would benefit from skilled OT needs s/p left TKA to increase independence with ADLs and ADL transfers. Performance deficits affecting function: weakness, impaired self care skills, impaired functional mobilty, gait instability, impaired balance, decreased lower extremity function, orthopedic precautions.      Rehab Prognosis: Good; patient would benefit from acute skilled OT services to address these deficits and reach maximum level of function.       Plan:     Patient to be seen daily to address the above listed problems via self-care/home management, therapeutic activities, therapeutic exercises  · Plan of Care Expires: 08/14/20  · Plan of Care Reviewed with: patient    Subjective     Chief Complaint: "I do not have anyone at home"  Patient/Family Comments/goals: "get back to life"    Occupational Profile:  Living Environment: lives alone, family has been assisting when needed/can: 1 level home with 1 step, walk in shower: 3n1 commode and RW  Previous level of function: independent with ADLs  Roles and Routines: enjoys going out to eat with friends   Equipment Used at Home:  walker, rolling, rollator  Assistance upon Discharge: family if they are able to assist    Pain/Comfort:  · Pain Rating 1: 0/10    Patients cultural, spiritual, Druze conflicts given the current situation: no    Objective:     Communicated with: RN prior to session.  Patient found up in chair with cryotherapy, " FCD, perineural catheter upon OT entry to room.    General Precautions: Standard, fall   Orthopedic Precautions:LLE weight bearing as tolerated   Braces: N/A     Occupational Performance:    Functional Mobility/Transfers:  · Patient completed Sit <> Stand Transfer with stand by assistance  with  rolling walker   · Patient completed Toilet Transfer Step Transfer technique with stand by assistance with  rolling walker   · Patient completed chair transfer with step transfer technique with stand by assistance with rolling walker   · Functional Mobility: patient ambulated to/from bathroom with use of rolling walker at stand by assistance     Activities of Daily Living:  · Grooming: stand by assistance standing at sink to wash hands and brush teeth  · Upper Body Dressing: stand by assistance sitting in chair to don bra and overhead shirt  · Lower Body Dressing: stand by assistance sitting in chair to don underwear/shorts: increased time and verbal cues  · Toileting: stand by assistance completed on bedside commode placed over toilet    Cognitive/Visual Perceptual:  Cognitive/Psychosocial Skills:     -       Oriented to: Person, Place and Time   -       Follows Commands/attention:Follows multistep  commands    Physical Exam:  Upper Extremity Range of Motion:     -       Right Upper Extremity: patient with grossly 20 degrees shoulder motion, reported torn rotator cuff  -       Left Upper Extremity: WFL  Upper Extremity Strength:    -       Right Upper Extremity: decreased  -       Left Upper Extremity: WFL    AMPAC 6 Click ADL:  AMPAC Total Score: 18    Treatment & Education:  -Patient educated to dress surgical side first and further dressing techniques s/p surgery   -Patient educated on hand placement for transfers   -Patient educated on rolling walker placement for ADLs  -Patient educated on polar ice use  -Patient educated on role OT and plan of care s/p surgery at [facility], white board updated as needed    Education:    Patient left up in chair with all lines intact, call button in reach and RN notified    GOALS:   Multidisciplinary Problems     Occupational Therapy Goals        Problem: Occupational Therapy Goal    Goal Priority Disciplines Outcome Interventions   Occupational Therapy Goal     OT, PT/OT Ongoing, Progressing    Description: Goals to be met by: 8-14-20     Patient will increase functional independence with ADLs by performing:    UE Dressing with Modified [place].  LE Dressing with Supervision.  Grooming while standing at sink with Supervision.  Toileting from toilet with Supervision for hygiene and clothing management.   Toilet transfer to toilet with Supervision.                     History:     Past Medical History:   Diagnosis Date    Anxiety     Arthritis     Basal cell carcinoma 09/2016    right post auricular neck     Breast cancer 1992    right    Cataract     Fibromyalgia     History of measles as a child     Hyperlipidemia     Hypertension     Personal history of colonic polyps     Pneumonia     SCC (squamous cell carcinoma) 2015    R chest    SCC (squamous cell carcinoma) 2016    left medial shoulder    SCC (squamous cell carcinoma) 2017    left knee    Shingles     Squamous cell carcinoma 2015    right forearm    Thyroid disease     Vaginitis        Past Surgical History:   Procedure Laterality Date    ADENOIDECTOMY      BREAST BIOPSY Left     Excisional bx, benign    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    CATARACT EXTRACTION W/  INTRAOCULAR LENS IMPLANT Bilateral     EYE SURGERY      JOINT REPLACEMENT Right     ELZBIETA    KNEE ARTHROPLASTY Left 8/10/2020    Procedure: ARTHROPLASTY, KNEE-ADE NEXGEN/CEMENTED TIBIA;  Surgeon: Kalin Pacheco MD;  Location: [facility];  Service: Orthopedics;  Laterality: Left;    thyriodectomy      partial    tonsillectomy      TONSILLECTOMY      TOTAL HIP ARTHROPLASTY      right    Yag  Left        Time Tracking:     OT Date  of Treatment: 08/11/20  OT Start Time: 0917  OT Stop Time: 0945  OT Total Time (min): 28 min    Billable Minutes:Evaluation 10  Self Care/Home Management 18    Stacy Cheung OT  8/11/2020

## 2020-08-12 ENCOUNTER — PATIENT OUTREACH (OUTPATIENT)
Dept: OTHER | Facility: OTHER | Age: 85
End: 2020-08-12

## 2020-08-12 VITALS
SYSTOLIC BLOOD PRESSURE: 145 MMHG | WEIGHT: 141 LBS | BODY MASS INDEX: 24.98 KG/M2 | RESPIRATION RATE: 18 BRPM | OXYGEN SATURATION: 96 % | HEART RATE: 62 BPM | DIASTOLIC BLOOD PRESSURE: 68 MMHG | TEMPERATURE: 98 F | HEIGHT: 63 IN

## 2020-08-12 PROCEDURE — 99212 OFFICE O/P EST SF 10 MIN: CPT | Mod: ,,, | Performed by: ANESTHESIOLOGY

## 2020-08-12 PROCEDURE — 97116 GAIT TRAINING THERAPY: CPT | Mod: 59

## 2020-08-12 PROCEDURE — 25000003 PHARM REV CODE 250: Performed by: NURSE PRACTITIONER

## 2020-08-12 PROCEDURE — 25000003 PHARM REV CODE 250: Performed by: STUDENT IN AN ORGANIZED HEALTH CARE EDUCATION/TRAINING PROGRAM

## 2020-08-12 PROCEDURE — 99212 PR OFFICE/OUTPT VISIT, EST, LEVL II, 10-19 MIN: ICD-10-PCS | Mod: ,,, | Performed by: ANESTHESIOLOGY

## 2020-08-12 PROCEDURE — 97110 THERAPEUTIC EXERCISES: CPT

## 2020-08-12 PROCEDURE — G0378 HOSPITAL OBSERVATION PER HR: HCPCS

## 2020-08-12 PROCEDURE — 94761 N-INVAS EAR/PLS OXIMETRY MLT: CPT

## 2020-08-12 PROCEDURE — 97535 SELF CARE MNGMENT TRAINING: CPT

## 2020-08-12 PROCEDURE — 99900035 HC TECH TIME PER 15 MIN (STAT)

## 2020-08-12 PROCEDURE — 97530 THERAPEUTIC ACTIVITIES: CPT

## 2020-08-12 RX ORDER — HYDRALAZINE HYDROCHLORIDE 10 MG/1
10 TABLET, FILM COATED ORAL EVERY 8 HOURS PRN
Status: DISCONTINUED | OUTPATIENT
Start: 2020-08-12 | End: 2020-08-12 | Stop reason: HOSPADM

## 2020-08-12 RX ORDER — LABETALOL 100 MG/1
100 TABLET, FILM COATED ORAL EVERY 12 HOURS
Qty: 360 TABLET | Refills: 3 | Status: SHIPPED | OUTPATIENT
Start: 2020-08-12 | End: 2020-10-16

## 2020-08-12 RX ADMIN — ACETAMINOPHEN 1000 MG: 500 TABLET ORAL at 12:08

## 2020-08-12 RX ADMIN — POLYETHYLENE GLYCOL 3350 17 G: 17 POWDER, FOR SOLUTION ORAL at 08:08

## 2020-08-12 RX ADMIN — DOCUSATE SODIUM 50MG AND SENNOSIDES 8.6MG 1 TABLET: 8.6; 5 TABLET, FILM COATED ORAL at 08:08

## 2020-08-12 RX ADMIN — ACETAMINOPHEN 1000 MG: 500 TABLET ORAL at 11:08

## 2020-08-12 RX ADMIN — ASPIRIN 81 MG: 81 TABLET, COATED ORAL at 08:08

## 2020-08-12 RX ADMIN — ACETAMINOPHEN 1000 MG: 500 TABLET ORAL at 06:08

## 2020-08-12 RX ADMIN — PANTOPRAZOLE SODIUM 40 MG: 40 TABLET, DELAYED RELEASE ORAL at 08:08

## 2020-08-12 RX ADMIN — PRAVASTATIN SODIUM 40 MG: 20 TABLET ORAL at 08:08

## 2020-08-12 RX ADMIN — OXYCODONE HYDROCHLORIDE 5 MG: 5 TABLET ORAL at 08:08

## 2020-08-12 RX ADMIN — MUPIROCIN 1 G: 20 OINTMENT TOPICAL at 11:08

## 2020-08-12 RX ADMIN — LABETALOL HYDROCHLORIDE 100 MG: 100 TABLET, FILM COATED ORAL at 08:08

## 2020-08-12 RX ADMIN — OXYCODONE HYDROCHLORIDE 5 MG: 5 TABLET ORAL at 04:08

## 2020-08-12 NOTE — PLAN OF CARE
Problem: Occupational Therapy Goal  Goal: Occupational Therapy Goal  Description: Goals to be met by: 8-14-20     Patient will increase functional independence with ADLs by performing:    UE Dressing with Modified [place].  LE Dressing with Supervision.  Grooming while standing at sink with Supervision.  Toileting from toilet with Supervision for hygiene and clothing management.   Toilet transfer to toilet with Supervision.    Outcome: Ongoing, Progressing    Patient completed lower body dressing of underwear/shorts at MIN A but socks/shoes at supervision.     Stacy Cheung, OT  8/12/2020

## 2020-08-12 NOTE — PT/OT/SLP PROGRESS
"Physical Therapy Treatment and Discharge     Patient Name:  Gerda Lai   MRN:  [id number]    Recommendations:     Discharge Recommendations:  home health PT   Discharge Equipment Recommendations: bedside commode   Barriers to discharge: Decreased caregiver support - lives alone     Assessment:     Gerda Lai is a 87 y.o. female admitted with a medical diagnosis of Primary osteoarthritis of left knee.  She presents with the following impairments/functional limitations:  weakness, impaired functional mobilty, gait instability, impaired balance, decreased lower extremity function, pain, decreased ROM, impaired skin, impaired joint extensibility, orthopedic precautions. Patient tolerated PT session well. Patient ambulated 200ft with RW and supervision . No LOB or SOB noted. Patient ascended/descended 6" curb step with RW and CGA. Patient performed L LE therex 2x10 reps. Patient has HH PT. Patient ready to discharge home from PT standpoint.     Rehab Prognosis: Good; patient would benefit from acute skilled PT services to address these deficits and reach maximum level of function.    Recent Surgery: Procedure(s) (LRB):  ARTHROPLASTY, KNEE-ADE NEXGEN/CEMENTED TIBIA (Left) 2 Days Post-Op    Plan:     During this hospitalization, patient to be seen daily to address the identified rehab impairments via gait training, therapeutic activities, therapeutic exercises and progress toward the following goals:    · Plan of Care Expires:  08/14/20    Subjective     Chief Complaint: Minimal pain in left knee.   Patient/Family Comments/goals: To walk without pain and not have to use her cane.   Pain/Comfort:  · Pain Rating 1: 3/10  · Location - Side 1: Left  · Location - Orientation 1: generalized  · Location 1: knee  · Pain Addressed 1: Pre-medicate for activity, Distraction, Cessation of Activity, Nurse notified      Objective:     Communicated with RN prior to session.  Patient found up in chair with cryotherapy, perineural " "catheter upon PT entry to room.     General Precautions: Standard, fall   Orthopedic Precautions:LLE weight bearing as tolerated   Braces: N/A     Functional Mobility:  · Mat Mobility:     · Supine to Sit: minimum assistance with L LE management   · Sit to Supine: supervision  · Transfers:     · Sit to Stand:  contact guard assistance with rolling walker x1 from bedside chair, x1 from mat, and x1 from wheelchair with verbal cues for hand placement   · Gait: Patient ambulated 200ft with Rolling Walker and supervision  using 3-point gait. Patient demonstrated decreased rupinder and decreased step length during gait due to impaired balance, pain, decreased ROM and decreased strength. Verbal cues provided for gait sequence and RW management.   · Stairs:  Pt ascended/descended 6" curb step with Rolling Walker and Contact Guard Assistance. Verbal cues provided for technique.    AM-PAC 6 CLICK MOBILITY  Turning over in bed (including adjusting bedclothes, sheets and blankets)?: 4  Sitting down on and standing up from a chair with arms (e.g., wheelchair, bedside commode, etc.): 4  Moving from lying on back to sitting on the side of the bed?: 4  Moving to and from a bed to a chair (including a wheelchair)?: 4  Need to walk in hospital room?: 4  Climbing 3-5 steps with a railing?: 4  Basic Mobility Total Score: 24       Therapeutic Activities and Exercises:  Patient educated in and performed L LE exercises 2x10 for ankle pumps, quad set, glute set, SAQ over bolster, heel slides, hip abd/add, SLR, and LAQ.    Patient educated in:  -PT role and POC  -safety with transfers including hand placement  -gait sequencing and RW management  -OOB activity to maximize recovery including ambulating at home to prevent DVT   -car transfer  -curb training  -HEP for therex at home with handout provided       Patient left up in chair with all lines intact, call button in reach and RN notified.    GOALS:   Multidisciplinary Problems     Physical " Therapy Goals     Not on file          Multidisciplinary Problems (Resolved)        Problem: Physical Therapy Goal    Goal Priority Disciplines Outcome Goal Variances Interventions   Physical Therapy Goal   (Resolved)     PT, PT/OT Met     Description: Goals to be met by: 2020     Patient will increase functional independence with mobility by performin. Supine to sit with supervision  2. Sit to stand transfer with supervision  3. Gait x300 feet with Supervision using Rolling Walker  4. Ascend/Descend 6 inch curb step with Stand-by Assistance using Rolling Walker  5. Lower extremity exercise program x30 reps per handout, with supervision                     Time Tracking:     PT Received On: 20  PT Start Time: 955     PT Stop Time: 1033  PT Total Time (min): 38 min     Billable Minutes: Gait Training 20 Therapeutic Activity 8 and Therapeutic Exercise 10    Treatment Type: Treatment  PT/PTA: PT       Mady Bearden, PT  2020

## 2020-08-12 NOTE — ADDENDUM NOTE
Addendum  created 08/12/20 1003 by Radha Zhao MD    Charge Capture section accepted, Clinical Note Signed

## 2020-08-12 NOTE — SUBJECTIVE & OBJECTIVE
Principal Problem:Primary osteoarthritis of left knee    Principal Orthopedic Problem: same    Interval History: Patient seen and examined at bedside.  No acute events overnight.  Pain controlled.  Walked 200 ft with PT yesterday.      Review of patient's allergies indicates:   Allergen Reactions    Sulfa (sulfonamide antibiotics) Rash    Clarithromycin Other (See Comments)     Weak, extreme fatigue. dizziness    Flexeril [cyclobenzaprine] Other (See Comments)     Dizziness    Iodine and iodide containing products     Lisinopril Other (See Comments)     Cough and sensation of throat swelling/?angioedema    Losartan Rash    Metoprolol Swelling     Tightness in throat    Tramadol Other (See Comments)     Dizzy and weak    Verapamil (bulk) Palpitations    Voltaren [diclofenac sodium] Other (See Comments)     Drops blood pressure       Current Facility-Administered Medications   Medication    acetaminophen tablet 1,000 mg    albuterol inhaler 2 puff    aliskiren tablet 300 mg    aspirin EC tablet 81 mg    bisacodyL suppository 10 mg    doxazosin tablet 1 mg    felodipine 24 hr tablet 2.5 mg    labetaloL tablet 100 mg    LORazepam tablet 0.5 mg    morphine injection 2 mg    mupirocin 2 % ointment 1 g    naloxone 0.4 mg/mL injection 0.02 mg    ondansetron injection 4 mg    oxyCODONE immediate release tablet 10 mg    oxyCODONE immediate release tablet 5 mg    pantoprazole EC tablet 40 mg    polyethylene glycol packet 17 g    pravastatin tablet 40 mg    pregabalin capsule 75 mg    promethazine (PHENERGAN) 6.25 mg in dextrose 5 % 50 mL IVPB    ropivacaine (PF) 2 mg/ml (0.2%) infusion    ropivacaine 0.2% ON-Q C-BLOC 400 ML (SELECT A FLOW)    senna-docusate 8.6-50 mg per tablet 1 tablet     Objective:     Vital Signs (Most Recent):  Temp: 97.7 °F (36.5 °C) (08/12/20 0410)  Pulse: 63 (08/12/20 0410)  Resp: 17 (08/12/20 0413)  BP: (!) 151/66 (08/12/20 0410)  SpO2: 96 % (08/12/20 0410) Vital Signs  "(24h Range):  Temp:  [97.7 °F (36.5 °C)-98.7 °F (37.1 °C)] 97.7 °F (36.5 °C)  Pulse:  [59-70] 63  Resp:  [16-18] 17  SpO2:  [96 %-99 %] 96 %  BP: (145-220)/(66-86) 151/66     Weight: 64 kg (141 lb)  Height: 5' 3" (160 cm)  Body mass index is 24.98 kg/m².        Ortho/SPM Exam    NAD  No increased WOB  Dressing c/d/i  SILT T/SP/DP  Motor intact T/DP  WWP extremities    Significant Labs: All pertinent labs within the past 24 hours have been reviewed.    Significant Imaging: I have reviewed all pertinent imaging results/findings.  "

## 2020-08-12 NOTE — PT/OT/SLP PROGRESS
Occupational Therapy   Treatment    Name: Gerda Lai  MRN: [id number]  Admitting Diagnosis:  Primary osteoarthritis of left knee  2 Days Post-Op    Recommendations:     Discharge Recommendations: home health OT  Discharge Equipment Recommendations:  bedside commode  Barriers to discharge:  Decreased caregiver support    Assessment:     Gerda Lai is a 87 y.o. female with a medical diagnosis of Primary osteoarthritis of left knee.  Patient is s/p left TKA. She completed grooming task and supervision level. She required MIN A today with underwear/shorts but supervision with shoes/socks.  Performance deficits affecting function are weakness, impaired self care skills, impaired functional mobilty, gait instability, impaired balance, decreased lower extremity function, orthopedic precautions.     Rehab Prognosis:  Good; patient would benefit from acute skilled OT services to address these deficits and reach maximum level of function.       Plan:     Patient to be seen daily to address the above listed problems via self-care/home management, therapeutic activities, therapeutic exercises  · Plan of Care Expires: 08/14/20  · Plan of Care Reviewed with: patient    Subjective     Patient reported she was going to the assisted living facility   Pain/Comfort:  · Pain Rating 1: 0/10    Objective:     Communicated with: RN prior to session.  Patient found supine with cryotherapy, perineural catheter, FCD upon OT entry to room.    General Precautions: Standard, fall   Orthopedic Precautions:LLE weight bearing as tolerated   Braces: N/A     Occupational Performance:     Bed Mobility:    · Patient completed Scooting/Bridging with supervision  · Patient completed Supine to Sit with supervision     Functional Mobility/Transfers:  · Patient completed Sit <> Stand Transfer with supervision  with  rolling walker   · Patient completed Toilet Transfer Step Transfer technique with supervision with  rolling walker   · Patient completed  chair transfer with step transfer technique with supervision with rolling walker   · Functional Mobility: patient ambulated to/from bathroom with use of rolling walker at stand by assistance     Activities of Daily Living:  · Grooming: supervision standing at sink to wash hands, brush teeth and comb hair  · Bathing: supervision patient completed a wipe off sitting on bedside commode placed over toilet  · Upper Body Dressing: supervision sitting on bedside commode placed over toilet: doffed nightgown, donned bra and overhead shirt  · Lower Body Dressing: minimum assistance sitting on bedside commode placed over toilet to don underwear/shorts, required MIN A for non surgical leg: was able to doff socks and don socks/shoes at supervision while sitting in chair  · Toileting: supervision completed on bedside commode placed over toilet    [facility] 6 Click ADL: 19    Treatment & Education:  -Patient educated to dress surgical side first and further dressing techniques s/p surgery   -Patient educated on hand placement for transfers   -Patient educated on rolling walker placement for ADLs  -Patient educated on polar ice use  -Patient educated on role OT and plan of care s/p surgery at [facility], white board updated as needed     Patient left up in chair with all lines intact, call button in reach and RN notifiedEducation:      GOALS:   Multidisciplinary Problems     Occupational Therapy Goals        Problem: Occupational Therapy Goal    Goal Priority Disciplines Outcome Interventions   Occupational Therapy Goal     OT, PT/OT Ongoing, Progressing    Description: Goals to be met by: 8-14-20     Patient will increase functional independence with ADLs by performing:    UE Dressing with Modified [place].  LE Dressing with Supervision.  Grooming while standing at sink with Supervision.  Toileting from toilet with Supervision for hygiene and clothing management.   Toilet transfer to toilet with Supervision.                      Time Tracking:     OT Date of Treatment: 08/12/20  OT Start Time: 0857  OT Stop Time: 0935  OT Total Time (min): 38 min    Billable Minutes:Self Care/Home Management 38    Stacy Cheung OT  8/12/2020

## 2020-08-12 NOTE — NURSING
Discharge instructions, follow up given to and explained to patient. Per discharge instructions, patient to stop taking home labetalol. Per Dr. Zelaya, patient to continue taking medication. Patient informed and verbalizes understanding. Patient discharged with niece at bedside. All questions acknowledged.

## 2020-08-12 NOTE — PROGRESS NOTES
Left adductor catheter removed prior to discharge due to patient being transferred to outside SNF facility. Blue tip intact to end of PNC upon removal. Pain well controlled at this time to left knee. Patient resting comfortably in recliner eating lunch. Denies any concerns at this time.

## 2020-08-12 NOTE — ASSESSMENT & PLAN NOTE
87 y.o. female s/p L TKA 8/10/20    VS:  HTN, otherwise stable  Nerve block:  Adductor PNC, periop spinal  PT/OT:  WBAT  DVT PPx:  ASA 81 BID  Cultures:  none  Abx:  Postop Ancef  Labs:  none  Drain:  none  Worley:  none    Dispo: DC to assisted living vs SNF; medically ready

## 2020-08-12 NOTE — PROGRESS NOTES
Digital Medicine: Clinician Follow-Up    Ms. Gerda Lai called concerned about her BP regimen. She completed knee surgery on yesterday and was informed today prior to discharge that she should stop taking labetalol for her blood pressure. Patient is concerned that stopping labetalol will cause her blood pressure to increase as seen in the past. She is questioning if this is correct and why.     The history is provided by the patient.   Follow-up reason(s): addressing patient questions/concerns.     Hypertension    Readings are trending up   Additional Follow-up details: Ms. Lorenz reported to the hospital on Monday for left knee surgery. She is being discharged today but has some concerns regarding communication that she should stop labetalol used for her BP management. She questioned the staff on the medication change but also called her [facility] clinician as she is a participate in the program and was concerned that she should not stop this medication. She reports having elevated readings last nights due to delays in receiving felodipine and Tekturna which resolved within a few hours after administering. Her last recorded BP today is 145/68. Her nurse was able to resolve her concern prior to discharge by stating that labetalol should not be stopped. Labetalol should be continued as part of her BP management. Ms. Lorenz will be in an assisted living facility over the next 2 weeks while she recovers. She states they will measure her BP daily and she will report those readings to the [facility] program either verbally or manually via the application.       Last 5 Patient Entered Readings                                      Current 30 Day Average: 173/72     Recent Readings 8/3/2020 8/3/2020 8/1/2020 8/1/2020 7/22/2020    SBP (mmHg) 156 156 201 201 177    DBP (mmHg) 72 72 79 79 68    Pulse 63 63 69 69 71             Screenings    ASSESSMENT(S)  Patients BP average is 178/73 mmHg, which is above goal. Patient's BP goal  is less than or equal to 140/90 per 2017 ACC/AHA Hypertension Guidelines.     Labetalol was removed from patient's medication regimen for hypertension. Corrected by adding back. Surgeon clarified that labetalol should be continued prior to patient leaving hospital.      Hypertension Plan  Continue current therapy.       Addressed any questions or concerns and patient has my contact information if needed prior to next outreach. Patient verbalizes understanding.            There are no preventive care reminders to display for this patient.    Hypertension Medications             aliskiren (TEKTURNA) 300 MG Tab Take 1 tablet (300 mg total) by mouth once daily.    doxazosin (CARDURA) 1 MG tablet Take 1 tablet (1 mg total) by mouth once daily.    felodipine (PLENDIL) 2.5 MG Tb24 TAKE ONE TABLET IN THE MORNING AND ONE TABLET IN THE EVENING    labetaloL (NORMODYNE) 100 MG tablet Take 1 tablet (100 mg total) by mouth every 12 (twelve) hours. TAKE 2 TABLETS (200 MG TOTAL) BY MOUTH EVERY 12 (TWELVE) HOURS.               Hospital Medications             aliskiren tablet 300 mg 300 mg, Oral, Nightly    doxazosin tablet 1 mg 1 mg, Oral, Nightly    felodipine 24 hr tablet 2.5 mg 2.5 mg, Oral, Nightly    hydrALAZINE tablet 10 mg 10 mg, Oral, Every 8 hours PRN    labetaloL tablet 100 mg 100 mg, Oral, Every 12 hours

## 2020-08-12 NOTE — PROGRESS NOTES
Acute Pain Service and Perioperative Surgical Home Progress Note    HPI  Gerda Lai is a 87 y.o., female, with PMH of   Past Medical History:   Diagnosis Date    Anxiety     Arthritis     Basal cell carcinoma 09/2016    right post auricular neck     Breast cancer 1992    right    Cataract     Fibromyalgia     History of measles as a child     Hyperlipidemia     Hypertension     Personal history of colonic polyps     Pneumonia     SCC (squamous cell carcinoma) 2015    R chest    SCC (squamous cell carcinoma) 2016    left medial shoulder    SCC (squamous cell carcinoma) 2017    left knee    Shingles     Squamous cell carcinoma 2015    right forearm    Thyroid disease     Vaginitis      who presents without any pain complaints at this time.  No events overnight.       Interval history      Surgery:  Procedure(s) (LRB):  ARTHROPLASTY, KNEE-ADE NEXGEN/CEMENTED TIBIA (Left)    Post Op Day #: 1    Catheter type: Perineural Adductor Canal    Infusion type: Ropivacaine 0.2%  8 ml/hr basal    Problem List:    Active Hospital Problems    Diagnosis  POA    *Primary osteoarthritis of left knee s/p TKA 8/10/20 [M17.12]  Yes    Macrocytic anemia [D53.9]  Yes    Carotid arterial disease [I73.9]  Yes    Anxiety [F41.9]  Yes     Chronic    Subclavian artery stenosis, right [I77.1]  Yes    Aortic atherosclerosis [I70.0]  Yes     Chronic     On CXR 5/12/18      HLD (hyperlipidemia) [E78.5]  Yes     Chronic    CKD (chronic kidney disease), stage III [N18.3]  Yes     Chronic    Hypertension, essential [I10]  Yes     Chronic     Note that she has an auscultatory gap.  BP is difficult to control and she has been intolerant to several meds.  BP goal is <150/90        Resolved Hospital Problems   No resolved problems to display.       Subjective:       General appearance of alert, oriented, no complaints   Pain with rest: 1    Numbers   Pain with movement: 1    Numbers   Side Effects    1. Pruritis No    2.  Nausea No    3. Motor Blockade No, 0=Ability to raise lower extremities off bed    4. Sedation No, S=sleep, easy to arouse    Schedule Medications:    acetaminophen  1,000 mg Oral Q6H    aliskiren  300 mg Oral QHS    aspirin  81 mg Oral BID    doxazosin  1 mg Oral QHS    felodipine  2.5 mg Oral QHS    labetaloL  100 mg Oral Q12H    mupirocin  1 g Nasal BID    pantoprazole  40 mg Oral Daily    polyethylene glycol  17 g Oral Daily    pravastatin  40 mg Oral Daily    pregabalin  75 mg Oral QHS    senna-docusate 8.6-50 mg  1 tablet Oral BID        Continuous Infusions:   ropivacaine (PF) 2 mg/ml (0.2%) 4 mL/hr (08/11/20 1658)    ropivacaine          PRN Medications:  albuterol, bisacodyL, LORazepam, morphine, naloxone, ondansetron, oxyCODONE, oxyCODONE, promethazine (PHENERGAN) IVPB, ropivacaine       Antibiotics:  Antibiotics (From admission, onward)    Start     Stop Route Frequency Ordered    08/10/20 2100  mupirocin 2 % ointment 1 g      08/15 2059 Nasl 2 times daily 08/10/20 1856             Objective:     Catheter site clean, dry, intact          Vital Signs (Most Recent):  Temp: 36.5 °C (97.7 °F) (08/12/20 0410)  Pulse: 63 (08/12/20 0410)  Resp: 17 (08/12/20 0413)  BP: (!) 151/66 (08/12/20 0410)  SpO2: 96 % (08/12/20 0410) Vital Signs Range (Last 24H):  Temp:  [36.5 °C (97.7 °F)-37.1 °C (98.7 °F)]   Pulse:  [59-70]   Resp:  [16-18]   BP: (145-220)/(66-86)   SpO2:  [96 %-99 %]          I & O (Last 24H):No intake or output data in the 24 hours ending 08/12/20 0533    Physical Exam:    GA: Alert, comfortable, no acute distress.   Pulmonary: Clear to auscultation A/P/L. No wheezing, crackles, or rhonchi.  Cardiac: RRR S1 & S2 w/o rubs/murmurs/gallops.   Abdominal:Bowel sounds present. No tenderness to palpation or distension. No appreciable hepatosplenomegaly.   Skin: No jaundice, rashes, or visible lesions.         Laboratory:  CBC: No results for input(s): WBC, RBC, HGB, HCT, PLT, MCV, MCH, MCHC in  the last 72 hours.    BMP: No results for input(s): NA, K, CO2, CL, BUN, CREATININE, GLU, MG, PHOS, CALCIUM in the last 72 hours.    No results for input(s): PT, INR, PROTIME, APTT in the last 72 hours.      Anticoagulant dose ASA at 81mg.    Assessment:         Pain control adequate    Plan:     1) Pain:    Adductor canal perineural catheter in place and infusing. Good level. Multimodal pain regimen with acetaminophen, Celebrex, Lyrica, and prn oxycodone given  Will continue to monitor. Plan to discharge with On-Q on POD #1.   2) BP controlled; continue home meds   3) FEN/GI: Tolerating liquids, advance diet as tolerated. + flatus. + BM.   4) Dispo: Pt working well with PT/OT. Case management and SW for placement. Plan for discharge POD #2.        Evaluator Toussaint Battley III, FNP        I have reviewed and concur with the nurse practitioner's history, physical, assessment, and plan.  I have personally interviewed and examined the patient at bedside.  See below addendum for my evaluation and additional findings.      Patient doing very well - blood pressure elevated overnight - home meds were reordered and blood pressure improved overnight - she denies headaches, visual changes, or any other symptoms associated with elevated blood pressure - otherwise feels well - tolerating a regular diet - she is doing well with PT - plan is for her to go to non [facility] SNF - PNC paused and will follow up after 2 hours - if pain still well controlled will dc PNC.  Otherwise medically stable for discharge

## 2020-08-12 NOTE — PLAN OF CARE
"Patient tolerated PT session well. Patient ambulated 200ft with RW and supervision . No LOB or SOB noted. Patient ascended/descended 6" curb step with RW and CGA. Patient performed L LE therex 2x10 reps. Patient has HH PT. Patient ready to discharge home from PT standpoint.       Problem: Physical Therapy Goal  Goal: Physical Therapy Goal  Description: Goals to be met by: 2020     Patient will increase functional independence with mobility by performin. Supine to sit with supervision  2. Sit to stand transfer with supervision  3. Gait x300 feet with Supervision using Rolling Walker  4. Ascend/Descend 6 inch curb step with Stand-by Assistance using Rolling Walker  5. Lower extremity exercise program x30 reps per handout, with supervision    Outcome: Met     "

## 2020-08-12 NOTE — PROGRESS NOTES
[facility]  Orthopedics  Progress Note    Patient Name: Gerda Lai  MRN: [id number]  Admission Date: 8/10/2020  Hospital Length of Stay: 0 days  Attending Provider: Kalin Pacheco MD  Primary Care Provider: Tomas Beltran MD  Follow-up For: Procedure(s) (LRB):  ARTHROPLASTY, KNEE-ADE NEXGEN/CEMENTED TIBIA (Left)    Post-Operative Day: 2 Days Post-Op  Subjective:     Principal Problem:Primary osteoarthritis of left knee    Principal Orthopedic Problem: same    Interval History: Patient seen and examined at bedside.  No acute events overnight.  Pain controlled.  Walked 200 ft with PT yesterday.      Review of patient's allergies indicates:   Allergen Reactions    Sulfa (sulfonamide antibiotics) Rash    Clarithromycin Other (See Comments)     Weak, extreme fatigue. dizziness    Flexeril [cyclobenzaprine] Other (See Comments)     Dizziness    Iodine and iodide containing products     Lisinopril Other (See Comments)     Cough and sensation of throat swelling/?angioedema    Losartan Rash    Metoprolol Swelling     Tightness in throat    Tramadol Other (See Comments)     Dizzy and weak    Verapamil (bulk) Palpitations    Voltaren [diclofenac sodium] Other (See Comments)     Drops blood pressure       Current Facility-Administered Medications   Medication    acetaminophen tablet 1,000 mg    albuterol inhaler 2 puff    aliskiren tablet 300 mg    aspirin EC tablet 81 mg    bisacodyL suppository 10 mg    doxazosin tablet 1 mg    felodipine 24 hr tablet 2.5 mg    labetaloL tablet 100 mg    LORazepam tablet 0.5 mg    morphine injection 2 mg    mupirocin 2 % ointment 1 g    naloxone 0.4 mg/mL injection 0.02 mg    ondansetron injection 4 mg    oxyCODONE immediate release tablet 10 mg    oxyCODONE immediate release tablet 5 mg    pantoprazole EC tablet 40 mg    polyethylene glycol packet 17 g    pravastatin tablet 40 mg    pregabalin capsule 75 mg    promethazine (PHENERGAN)  "6.25 mg in dextrose 5 % 50 mL IVPB    ropivacaine (PF) 2 mg/ml (0.2%) infusion    ropivacaine 0.2% ON-Q C-BLOC 400 ML (SELECT A FLOW)    senna-docusate 8.6-50 mg per tablet 1 tablet     Objective:     Vital Signs (Most Recent):  Temp: 97.7 °F (36.5 °C) (08/12/20 0410)  Pulse: 63 (08/12/20 0410)  Resp: 17 (08/12/20 0413)  BP: (!) 151/66 (08/12/20 0410)  SpO2: 96 % (08/12/20 0410) Vital Signs (24h Range):  Temp:  [97.7 °F (36.5 °C)-98.7 °F (37.1 °C)] 97.7 °F (36.5 °C)  Pulse:  [59-70] 63  Resp:  [16-18] 17  SpO2:  [96 %-99 %] 96 %  BP: (145-220)/(66-86) 151/66     Weight: 64 kg (141 lb)  Height: 5' 3" (160 cm)  Body mass index is 24.98 kg/m².        Ortho/SPM Exam    NAD  No increased WOB  Dressing c/d/i  SILT T/SP/DP  Motor intact T/DP  WWP extremities    Significant Labs: All pertinent labs within the past 24 hours have been reviewed.    Significant Imaging: I have reviewed all pertinent imaging results/findings.    Assessment/Plan:     * Primary osteoarthritis of left knee s/p TKA 8/10/20  87 y.o. female s/p L TKA 8/10/20    VS:  HTN, otherwise stable  Nerve block:  Adductor PNC, periop spinal  PT/OT:  WBAT  DVT PPx:  ASA 81 BID  Cultures:  none  Abx:  Postop Ancef  Labs:  none  Drain:  none  Worley:  none    Dispo: DC to assisted living vs SNF; medically ready      Macrocytic anemia  Stable     Carotid arterial disease  Stable     Anxiety  Home meds    Subclavian artery stenosis, right  stable    Aortic atherosclerosis  stable    CKD (chronic kidney disease), stage III  Avoid NSAIDs other than ASA    HLD (hyperlipidemia)  Home meds    Hypertension, essential  Home meds          Tad Zelaya MD  Orthopedics  [facility]  "

## 2020-08-12 NOTE — ADDENDUM NOTE
Addendum  created 08/12/20 0559 by Toussaint Battley III, FNP    Clinical Note Signed, Pend clinical note

## 2020-08-12 NOTE — DISCHARGE SUMMARY
[facility]  Orthopedics  Discharge Summary      Patient Name: Gerda Lai  MRN: [id number]  Admission Date: 8/10/2020  Hospital Length of Stay: 0 days  Discharge Date and Time:  08/12/2020 3:46 PM  Attending Physician: Kalin Pacheco MD   Discharging Provider: Sulaiman Zelaya MD  Primary Care Provider: Tomas Beltran MD    HPI: Presented for L TKA.    Procedure(s) (LRB):  ARTHROPLASTY, KNEE-ADE NEXGEN/CEMENTED TIBIA (Left)      Hospital Course: Uncomplicated.  DC home with sitter and HH POD2 after cleared by PT.  ASA 81 BID for DVT PPx.  WBAT.  F/u 2 weeks for wound check.       Consults (From admission, onward)        Status Ordering Provider     Inpatient consult to Pain Management  Once     Provider:  (Not yet assigned)    Acknowledged ADELFO FREEMAN     Inpatient consult to Respiratory Care  Once     Provider:  (Not yet assigned)    Acknowledged ADELFO FREEMAN     Inpatient consult to SNF  Once     Provider:  (Not yet assigned)    Acknowledged SULAIMAN ZELAYA     Inpatient consult to SNF Nursing Home  Once     Provider:  (Not yet assigned)    Acknowledged SULAIMAN ZELAYA     Inpatient consult to Social Work  Once     Provider:  (Not yet assigned)    Acknowledged ADELFO FREEMAN          Significant Diagnostic Studies: No pertinent studies.    Pending Diagnostic Studies:     None        Final Active Diagnoses:    Diagnosis Date Noted POA    PRINCIPAL PROBLEM:  Primary osteoarthritis of left knee s/p TKA 8/10/20 [M17.12] 08/10/2020 Yes    Macrocytic anemia [D53.9] 08/06/2020 Yes    Carotid arterial disease [I73.9] 07/17/2020 Yes    Anxiety [F41.9] 07/06/2020 Yes     Chronic    Subclavian artery stenosis, right [I77.1] 12/04/2019 Yes    Aortic atherosclerosis [I70.0] 01/24/2019 Yes     Chronic    Left leg weakness [R29.898] 02/07/2018 Yes    HLD (hyperlipidemia) [E78.5] 06/14/2015 Yes     Chronic    CKD (chronic kidney disease), stage III [N18.3] 06/14/2015 Yes      "Chronic    Hypertension, essential [I10] 07/17/2012 Yes     Chronic      Problems Resolved During this Admission:      Discharged Condition: stable    Disposition: [place] [facility]    Follow Up:  Follow-up Information     Stacy Harris NP On 8/24/2020.    Specialty: Orthopedic Surgery  Contact information:  [address]  [facility] [address]  [phone]                 Patient Instructions:      COMMODE FOR HOME USE     Order Specific Question Answer Comments   Type: Standard    Height: 5' 3" (1.6 m)    Weight: 64 kg (141 lb)    Length of need (1-99 months): 1      BATH/SHOWER CHAIR FOR HOME USE     Order Specific Question Answer Comments   Height: 5' 3" (1.6 m)    Weight: 64 kg (141 lb)    Length of need (1-99 months): 99    Type: Without back      WALKER FOR HOME USE     Order Specific Question Answer Comments   Type of Walker: Adult (5'4"-6'6")    With wheels? Yes    Height: 5' 3" (1.6 m)    Weight: 64 kg (141 lb)    Length of need (1-99 months): 1    Please check all that apply: Walker will be used for gait training.      TRANSFER TUB BENCH FOR HOME USE     Order Specific Question Answer Comments   Type of Transfer Tub Bench: Unpadded    Height: 5' 3" (1.6 m)    Weight: 64 kg (141 lb)    Length of need (1-99 months): 1      Activity as tolerated     Call MD for:  difficulty breathing, headache or visual disturbances     Call MD for:  extreme fatigue     Call MD for:  hives     Call MD for:  persistent dizziness or light-headedness     Call MD for:  persistent nausea and vomiting     Call MD for:  redness, tenderness, or signs of infection (pain, swelling, redness, odor or green/yellow discharge around incision site)     Call MD for:  severe uncontrolled pain     Call MD for:  temperature >100.4     Keep surgical extremity elevated     Leave dressing on - Keep it clean, dry, and intact until clinic visit   Order Comments: Do not remove surgical dressing for 2 weeks post-op. This will be done only " by MD at initial post-op visit. If dressing is completely saturated, replace with identical dressing - silver-impregnated hydrocolloid dressing.     Do not get dressings wet. Do not shower.     If dressing continues to be saturated or there are signs of infection, please call Ortho Clinic [phone] for further instructions and to make appt to be seen.     Lifting restrictions   Order Comments: No strenuous exercise or lifting of > 10 lbs     No driving, operating heavy equipment or signing legal documents while taking pain medication     Sponge bath only until clinic visit     Weight bearing as tolerated     Medications:  Reconciled Home Medications:      Medication List      CHANGE how you take these medications    labetaloL 100 MG tablet  Commonly known as: NORMODYNE  Take 1 tablet (100 mg total) by mouth every 12 (twelve) hours.   What changed:   · how much to take  · how to take this  · when to take this     MAPAP ARTHRITIS PAIN 650 MG Tbsr  Generic drug: acetaminophen  Take 1 tablet (650 mg total) by mouth every 8 (eight) hours as needed.  What changed: Another medication with the same name was removed. Continue taking this medication, and follow the directions you see here.        CONTINUE taking these medications    albuterol 90 mcg/actuation inhaler  Commonly known as: PROVENTIL/VENTOLIN HFA  Inhale 2 puffs into the lungs every 6 (six) hours as needed for Wheezing. Rescue     aliskiren 300 MG Tab  Commonly known as: TEKTURNA  Take 1 tablet (300 mg total) by mouth once daily.     ASPERCREME TOP  Apply topically.     aspirin 81 MG EC tablet  Commonly known as: ECOTRIN  Take 1 tablet (81 mg total) by mouth 2 (two) times daily.     CO Q-10 100 mg capsule  Generic drug: coenzyme Q10  Take 100 mg by mouth once daily.     doxazosin 1 MG tablet  Commonly known as: CARDURA  Take 1 tablet (1 mg total) by mouth once daily.     felodipine 2.5 MG Tb24  Commonly known as: PLENDIL  TAKE ONE TABLET IN THE MORNING AND ONE  TABLET IN THE EVENING     glucosamine-chondroitin 500-400 mg tablet  Take 1 tablet by mouth once daily.     ipratropium 0.03 % nasal spray  Commonly known as: ATROVENT  2 sprays by Nasal route 2 (two) times daily as needed.     LORazepam 0.5 MG tablet  Commonly known as: ATIVAN  Take 0.5-1 tablets (0.25-0.5 mg total) by mouth every 12 (twelve) hours as needed for Anxiety.     multivitamin capsule  Take 1 capsule by mouth once daily.     omeprazole 20 MG tablet  Commonly known as: PRILOSEC OTC  Take 20 mg by mouth once daily.     oxyCODONE 5 MG immediate release tablet  Commonly known as: ROXICODONE  Take 1-2 tabs by mouth every 4-6 hours as needed for pain     polymyxin B sulf-trimethoprim 10,000 unit- 1 mg/mL Drop  Commonly known as: POLYTRIM  Place 1 drop into both eyes every 6 (six) hours.     pravastatin 40 MG tablet  Commonly known as: PRAVACHOL  Take 1 tablet (40 mg total) by mouth once daily.     STOOL SOFTENER 100 MG capsule  Generic drug: docusate sodium  Take 1 capsule (100 mg total) by mouth 2 (two) times daily as needed for Constipation.     TURMERIC ORAL  Take 500 mg by mouth once daily.        STOP taking these medications    meloxicam 15 MG tablet  Commonly known as: MOBIC     PROBIOTIC 4X ORAL            Tad Zelaya MD  Orthopedics  [facility]

## 2020-08-12 NOTE — ADDENDUM NOTE
Addendum  created 08/12/20 1237 by Deshaun Cohen RN    Clinical Note Signed, Intraprocedure Event edited

## 2020-08-14 PROCEDURE — G0180 PR HOME HEALTH MD CERTIFICATION: ICD-10-PCS | Mod: ,,, | Performed by: ORTHOPAEDIC SURGERY

## 2020-08-14 PROCEDURE — G0180 MD CERTIFICATION HHA PATIENT: HCPCS | Mod: ,,, | Performed by: ORTHOPAEDIC SURGERY

## 2020-08-16 DIAGNOSIS — I10 HYPERTENSION, ESSENTIAL: Chronic | ICD-10-CM

## 2020-08-16 NOTE — PLAN OF CARE
Pt discharged to [facility]. Pt had RW in place and was delivered a BSC from [facility].    Future Appointments   Date Time Provider Department Center   8/24/2020  2:00 PM Stacy Harris NP [facility] ORTHO [facility]   11/25/2020 11:00 AM Cesar Burroughs MD [facility]        08/12/20 1500   Final Note   Assessment Type Final Discharge Note   Anticipated Discharge Disposition Home-Health   Hospital Follow Up  Appt(s) scheduled? Yes   Right Care Referral Info   Post Acute Recommendation Home-care   Facility Name [facility]   Post-Acute Status   Post-Acute Authorization HME;Home Health   HME Status Set-up Complete   Home Health Status Set-up Complete   Discharge Delays None known at this time

## 2020-08-16 NOTE — PLAN OF CARE
Patient choice form signed for [facility].     08/12/20 1200   Discharge Reassessment   Assessment Type Discharge Planning Reassessment   Patient choice form signed by patient/caregiver Yes  (signed by patient)

## 2020-08-17 RX ORDER — LORAZEPAM 0.5 MG/1
.25-.5 TABLET ORAL EVERY 12 HOURS PRN
Qty: 30 TABLET | Refills: 0 | Status: SHIPPED | OUTPATIENT
Start: 2020-08-17 | End: 2020-09-18

## 2020-08-18 ENCOUNTER — PATIENT OUTREACH (OUTPATIENT)
Dept: OTHER | Facility: OTHER | Age: 85
End: 2020-08-18

## 2020-08-18 NOTE — PROGRESS NOTES
Digital Medicine: Clinician Follow-Up    The history is provided by the patient.   Follow-up reason(s): routine follow up.     Hypertension  Patient needs assistance troubleshooting: Patient is in an assisted living center recovering from knee replacement surgery. .    Additional Follow-up details: Returned missed phone call to Ms. Gerda Lai. Patient wanted to report that she is in an assisted living center recovering from knee replacement surgery one week ago. She reports that her recovery is going fine. She reports that nurses at the facility are measuring her BP for her. She reports recent readings of 142/82 and 122/66 from the nurse. She is continuing to take her medications daily as prescribed. She reports that knee pain is significantly improved with knee replacement and she is walking much better. She continues to do her morning exercises. She plans to go home in a week.      Last 5 Patient Entered Readings                                      Current 30 Day Average: 178/73     Recent Readings 8/3/2020 8/3/2020 8/1/2020 8/1/2020 7/22/2020    SBP (mmHg) 156 156 201 201 177    DBP (mmHg) 72 72 79 79 68    Pulse 63 63 69 69 71               Depression Screening  Did not address depression screening.    Sleep Apnea Screening    Did not address sleep apnea screening.     Medication Affordability Screening  Did not address medication affordability screening.     Medication Adherence-Medication adherence was assessed.          ASSESSMENT(S)  Patients BP average is 178/73 mmHg, which is above goal. Patient's BP goal is less than or equal to 140/90 per 2017 ACC/AHA Hypertension Guidelines.     BP readings were beginning to trend downwards before knee surgery. Based on reported readings, BP is continuing to trend downwards and appears to be well-controlled. No changes suggested at this time. Will continue to monitor.       Hypertension Plan  Continue current therapy.  Continue current diet/physical activity  routine.       Addressed any questions or concerns and patient has my contact information if needed prior to next outreach. Patient verbalizes understanding.            There are no preventive care reminders to display for this patient.      Hypertension Medications             aliskiren (TEKTURNA) 300 MG Tab Take 1 tablet (300 mg total) by mouth once daily.    doxazosin (CARDURA) 1 MG tablet Take 1 tablet (1 mg total) by mouth once daily.    felodipine (PLENDIL) 2.5 MG Tb24 TAKE ONE TABLET IN THE MORNING AND ONE TABLET IN THE EVENING    labetaloL (NORMODYNE) 100 MG tablet Take 1 tablet (100 mg total) by mouth every 12 (twelve) hours. TAKE 2 TABLETS (200 MG TOTAL) BY MOUTH EVERY 12 (TWELVE) HOURS.

## 2020-08-21 ENCOUNTER — EXTERNAL HOME HEALTH (OUTPATIENT)
Dept: HOME HEALTH SERVICES | Facility: HOSPITAL | Age: 85
End: 2020-08-21
Payer: MEDICARE

## 2020-08-21 NOTE — PROGRESS NOTES
8/21/20: Patient left voice mail reporting her measured BP in the rehabilitation center today was 124/60. She will remain int he facility for recovery from knee replacement surgery. Consider lowering BP medications if needed to prevent signs/symptoms of hypotension.  Hypertension Medications             aliskiren (TEKTURNA) 300 MG Tab Take 1 tablet (300 mg total) by mouth once daily.    doxazosin (CARDURA) 1 MG tablet Take 1 tablet (1 mg total) by mouth once daily.    felodipine (PLENDIL) 2.5 MG Tb24 TAKE ONE TABLET IN THE MORNING AND ONE TABLET IN THE EVENING    labetaloL (NORMODYNE) 100 MG tablet Take 1 tablet (100 mg total) by mouth every 12 (twelve) hours. TAKE 2 TABLETS (200 MG TOTAL) BY MOUTH EVERY 12 (TWELVE) HOURS.

## 2020-08-24 ENCOUNTER — PATIENT OUTREACH (OUTPATIENT)
Dept: OTHER | Facility: OTHER | Age: 85
End: 2020-08-24

## 2020-08-24 ENCOUNTER — OFFICE VISIT (OUTPATIENT)
Dept: ORTHOPEDICS | Facility: CLINIC | Age: 85
End: 2020-08-24
Payer: MEDICARE

## 2020-08-24 DIAGNOSIS — E78.00 PURE HYPERCHOLESTEROLEMIA: Primary | ICD-10-CM

## 2020-08-24 DIAGNOSIS — Z96.659 STATUS POST KNEE REPLACEMENT, UNSPECIFIED LATERALITY: Primary | ICD-10-CM

## 2020-08-24 PROCEDURE — 99999 PR PBB SHADOW E&M-EST. PATIENT-LVL III: ICD-10-PCS | Mod: PBBFAC,,, | Performed by: NURSE PRACTITIONER

## 2020-08-24 PROCEDURE — 99999 PR PBB SHADOW E&M-EST. PATIENT-LVL III: CPT | Mod: PBBFAC,,, | Performed by: NURSE PRACTITIONER

## 2020-08-24 PROCEDURE — 99024 PR POST-OP FOLLOW-UP VISIT: ICD-10-PCS | Mod: POP,,, | Performed by: NURSE PRACTITIONER

## 2020-08-24 PROCEDURE — 99024 POSTOP FOLLOW-UP VISIT: CPT | Mod: POP,,, | Performed by: NURSE PRACTITIONER

## 2020-08-24 PROCEDURE — 99213 OFFICE O/P EST LOW 20 MIN: CPT | Mod: PBBFAC | Performed by: NURSE PRACTITIONER

## 2020-08-24 NOTE — PROGRESS NOTES
Gerda Lai presents for initial post-operative visit following a left total knee arthroplasty performed by Dr. Pacheco on 8/10/2020.        Exam:   Ambulating well with assistive device.  Incision is clean and dry without drainage or erythema.   ROM:0-95    Initial post-operative radiographs reviewed today revealing a well fixed and aligned prosthesis.    A/P:  2 weeks s/p left total knee replacement  - The patient was advised to keep the incision clean and dry for the next 24 hours after which she may wash the area with antibacterial soap in the shower. Will not submerge until the incision is completely healed.   - Outpatient PT: one more week of home health then transition to [facility]  - Continue aspirin for 1 month from surgery.  - Reviewed antibiotic prophylaxis  - Pain medication: pt will try taking 1/2 of the oxycodone. If still too strong, will try norco  - Follow up in 4 weeks with Dr. Pacheco. Pt will call clinic with problems/concerns.

## 2020-08-24 NOTE — PROGRESS NOTES
Digital Medicine: Clinician Introduction    Gerda Lai is a 87 y.o. female who is newly enrolled in the [facility].    Gerda Lai called to report that she is still in the rehabilitation facility. The nurse measured her BP today and recorded a reading of 122/60. She denies any lightheadedness, dizziness or other complaints with this reading. She feels that her recovery from knee surgery is going well. She has decided to stay at the facility for a few more weeks to ensure that she is able to fully recover before returning home alone.     The history is provided by the patient.      Review of patient's allergies indicates:   -- Sulfa (sulfonamide antibiotics) -- Rash   -- Clarithromycin -- Other (See Comments)    --  Weak, extreme fatigue. dizziness   -- Flexeril (cyclobenzaprine) -- Other (See Comments)    --  Dizziness   -- Iodine and iodide containing products    -- Lisinopril -- Other (See Comments)    --  Cough and sensation of throat swelling/?angioedema   -- Losartan -- Rash   -- Metoprolol -- Swelling    --  Tightness in throat   -- Tramadol -- Other (See Comments)    --  Dizzy and weak   -- Verapamil (bulk) -- Palpitations   -- Voltaren (diclofenac sodium) -- Other (See Comments)    --  Drops blood pressure  Follow-up reason(s): routine follow up.               Screenings    ASSESSMENT(S)  Patients BP average is 179/76 mmHg, which is above goal. Patient's BP goal is less than or equal to 140/90 per 2017 ACC/AHA Hypertension Guidelines.       Hypertension Plan  Continue current therapy.       Addressed any questions or concerns and patient has my contact information if needed prior to next outreach. Patient verbalizes understanding.            There are no preventive care reminders to display for this patient.    Current Medication Regimen:  Hypertension Medications             aliskiren (TEKTURNA) 300 MG Tab Take 1 tablet (300 mg total) by mouth once daily.    doxazosin (CARDURA) 1 MG  tablet Take 1 tablet (1 mg total) by mouth once daily.    felodipine (PLENDIL) 2.5 MG Tb24 TAKE ONE TABLET IN THE MORNING AND ONE TABLET IN THE EVENING    labetaloL (NORMODYNE) 100 MG tablet Take 1 tablet (100 mg total) by mouth every 12 (twelve) hours. TAKE 2 TABLETS (200 MG TOTAL) BY MOUTH EVERY 12 (TWELVE) HOURS.

## 2020-08-25 ENCOUNTER — TELEPHONE (OUTPATIENT)
Dept: ORTHOPEDICS | Facility: CLINIC | Age: 85
End: 2020-08-25

## 2020-08-25 NOTE — TELEPHONE ENCOUNTER
Spoke to Nathalie, she advised patient would like an additional home health with [facility]. Advised to let the home health know and they will contact us to let us know. She was agreeable.         ----- Message from Steven Castro sent at 8/25/2020 11:09 AM CDT -----  Contact: Nathalie/katarina ellington  Please call katarina ellignton at [phone]    Patient yadyece has questions regarding the patient future physical therapy sessions    Thank you

## 2020-08-27 DIAGNOSIS — Z96.659 STATUS POST KNEE REPLACEMENT, UNSPECIFIED LATERALITY: Primary | ICD-10-CM

## 2020-08-31 ENCOUNTER — OFFICE VISIT (OUTPATIENT)
Dept: DERMATOLOGY | Facility: CLINIC | Age: 85
End: 2020-08-31
Payer: MEDICARE

## 2020-08-31 DIAGNOSIS — L82.1 SK (SEBORRHEIC KERATOSIS): ICD-10-CM

## 2020-08-31 DIAGNOSIS — L82.0 INFLAMED SEBORRHEIC KERATOSIS: ICD-10-CM

## 2020-08-31 DIAGNOSIS — Z85.828 PERSONAL HISTORY OF SKIN CANCER: ICD-10-CM

## 2020-08-31 DIAGNOSIS — D18.01 CHERRY ANGIOMA: ICD-10-CM

## 2020-08-31 DIAGNOSIS — L90.5 SCAR: ICD-10-CM

## 2020-08-31 DIAGNOSIS — L57.0 AK (ACTINIC KERATOSIS): Primary | ICD-10-CM

## 2020-08-31 DIAGNOSIS — D22.9 NEVUS: ICD-10-CM

## 2020-08-31 DIAGNOSIS — L81.4 LENTIGO: ICD-10-CM

## 2020-08-31 PROCEDURE — 17003 DESTRUCT PREMALG LES 2-14: CPT | Mod: 59,S$PBB,, | Performed by: DERMATOLOGY

## 2020-08-31 PROCEDURE — 17000 DESTRUCT PREMALG LESION: CPT | Mod: 59,S$PBB,, | Performed by: DERMATOLOGY

## 2020-08-31 PROCEDURE — 17000 DESTRUCT PREMALG LESION: CPT | Mod: 59,PBBFAC,PO | Performed by: DERMATOLOGY

## 2020-08-31 PROCEDURE — 99213 OFFICE O/P EST LOW 20 MIN: CPT | Mod: 25,S$PBB,, | Performed by: DERMATOLOGY

## 2020-08-31 PROCEDURE — 99458 PR REMOTE PHYSIOL MONIT, EA ADDTL 20 MINS: ICD-10-PCS | Mod: ,,, | Performed by: INTERNAL MEDICINE

## 2020-08-31 PROCEDURE — 99457 PR MONITORING, PHYSIOL PARAM, REMOTE, 1ST 20 MINS, PER MONTH: ICD-10-PCS | Mod: S$PBB,,, | Performed by: INTERNAL MEDICINE

## 2020-08-31 PROCEDURE — 99457 RPM TX MGMT 1ST 20 MIN: CPT | Mod: S$PBB,,, | Performed by: INTERNAL MEDICINE

## 2020-08-31 PROCEDURE — 99999 PR PBB SHADOW E&M-EST. PATIENT-LVL III: ICD-10-PCS | Mod: PBBFAC,,, | Performed by: DERMATOLOGY

## 2020-08-31 PROCEDURE — 99458 RPM TX MGMT EA ADDL 20 MIN: CPT | Mod: ,,, | Performed by: INTERNAL MEDICINE

## 2020-08-31 PROCEDURE — 17003 DESTRUCT PREMALG LES 2-14: CPT | Mod: 59,PBBFAC,PO | Performed by: DERMATOLOGY

## 2020-08-31 PROCEDURE — 17003 DESTRUCTION, PREMALIGNANT LESIONS; SECOND THROUGH 14 LESIONS: ICD-10-PCS | Mod: 59,S$PBB,, | Performed by: DERMATOLOGY

## 2020-08-31 PROCEDURE — 99213 OFFICE O/P EST LOW 20 MIN: CPT | Mod: PBBFAC,PO | Performed by: DERMATOLOGY

## 2020-08-31 PROCEDURE — 17110 PR DESTRUCTION BENIGN LESIONS UP TO 14: ICD-10-PCS | Mod: S$PBB,,, | Performed by: DERMATOLOGY

## 2020-08-31 PROCEDURE — 17110 DESTRUCTION B9 LES UP TO 14: CPT | Mod: PBBFAC,PO | Performed by: DERMATOLOGY

## 2020-08-31 PROCEDURE — 17110 DESTRUCTION B9 LES UP TO 14: CPT | Mod: S$PBB,,, | Performed by: DERMATOLOGY

## 2020-08-31 PROCEDURE — 99999 PR PBB SHADOW E&M-EST. PATIENT-LVL III: CPT | Mod: PBBFAC,,, | Performed by: DERMATOLOGY

## 2020-08-31 PROCEDURE — 17000 PR DESTRUCTION(LASER SURGERY,CRYOSURGERY,CHEMOSURGERY),PREMALIGNANT LESIONS,FIRST LESION: ICD-10-PCS | Mod: 59,S$PBB,, | Performed by: DERMATOLOGY

## 2020-08-31 PROCEDURE — 99213 PR OFFICE/OUTPT VISIT, EST, LEVL III, 20-29 MIN: ICD-10-PCS | Mod: 25,S$PBB,, | Performed by: DERMATOLOGY
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
## 2020-09-02 ENCOUNTER — CLINICAL SUPPORT (OUTPATIENT)
Dept: REHABILITATION | Facility: HOSPITAL | Age: 85
End: 2020-09-02
Attending: INTERNAL MEDICINE
Payer: MEDICARE

## 2020-09-02 DIAGNOSIS — R53.1 DECREASED STRENGTH: Primary | ICD-10-CM

## 2020-09-02 DIAGNOSIS — Z74.09 IMPAIRED FUNCTIONAL MOBILITY, BALANCE, GAIT, AND ENDURANCE: ICD-10-CM

## 2020-09-02 DIAGNOSIS — M25.662 DECREASED RANGE OF MOTION (ROM) OF LEFT KNEE: ICD-10-CM

## 2020-09-02 DIAGNOSIS — M25.562 ACUTE PAIN OF LEFT KNEE: ICD-10-CM

## 2020-09-02 PROBLEM — R26.81 UNSTEADY GAIT: Status: RESOLVED | Noted: 2020-01-23 | Resolved: 2020-09-02

## 2020-09-02 PROBLEM — M25.661 KNEE JOINT STIFFNESS, BILATERAL: Status: RESOLVED | Noted: 2020-06-11 | Resolved: 2020-09-02

## 2020-09-02 PROBLEM — M25.612 SHOULDER JOINT STIFFNESS, BILATERAL: Status: RESOLVED | Noted: 2020-06-11 | Resolved: 2020-09-02

## 2020-09-02 PROBLEM — R29.898 WEAKNESS OF BOTH HIPS: Status: RESOLVED | Noted: 2019-05-27 | Resolved: 2020-09-02

## 2020-09-02 PROBLEM — R26.89 IMPAIRMENT OF BALANCE: Status: RESOLVED | Noted: 2019-05-27 | Resolved: 2020-09-02

## 2020-09-02 PROBLEM — M53.86 DECREASED ROM OF LUMBAR SPINE: Status: RESOLVED | Noted: 2020-06-11 | Resolved: 2020-09-02

## 2020-09-02 PROBLEM — M25.661 DECREASED RANGE OF MOTION (ROM) OF BOTH KNEES: Status: RESOLVED | Noted: 2019-05-27 | Resolved: 2020-09-02

## 2020-09-02 PROBLEM — M25.611 SHOULDER JOINT STIFFNESS, BILATERAL: Status: RESOLVED | Noted: 2020-06-11 | Resolved: 2020-09-02

## 2020-09-02 PROBLEM — M25.561 BILATERAL KNEE PAIN: Status: RESOLVED | Noted: 2018-09-07 | Resolved: 2020-09-02

## 2020-09-02 PROBLEM — R29.898 LEFT LEG WEAKNESS: Status: RESOLVED | Noted: 2018-02-07 | Resolved: 2020-09-02

## 2020-09-02 PROBLEM — M25.651 DECREASED RANGE OF MOTION OF BOTH HIPS: Status: RESOLVED | Noted: 2019-05-27 | Resolved: 2020-09-02

## 2020-09-02 PROBLEM — M62.81 QUADRICEPS WEAKNESS: Status: RESOLVED | Noted: 2019-05-27 | Resolved: 2020-09-02

## 2020-09-02 PROBLEM — M25.652 DECREASED RANGE OF MOTION OF BOTH HIPS: Status: RESOLVED | Noted: 2019-05-27 | Resolved: 2020-09-02

## 2020-09-02 PROCEDURE — 97161 PT EVAL LOW COMPLEX 20 MIN: CPT | Mod: PO

## 2020-09-02 NOTE — PLAN OF CARE
[facility]  Physical Therapy Initial Evaluation    Date: 9/2/2020   Name: Gerda Lai  Clinic Number: [id number]    Therapy Diagnosis:   Encounter Diagnoses   Name Primary?    Decreased range of motion (ROM) of left knee     Impaired functional mobility, balance, gait, and endurance     Acute pain of left knee     Decreased strength Yes     Physician: Stacy Harris NP    Physician Orders: PT Eval and Treat S/p left knee replacement by Dr. Pacheco. She has completed home health and is ready to proceed with outpatient PT  Medical Diagnosis from Referral: Status post knee replacement, unspecified laterality  Evaluation Date: 9/2/2020  Authorization Period Expiration: 8/27/21  Plan of Care Expiration: 10/30/20  Visit # / Visits authorized: 1/ 1    Time In: 11:11 AM (pt with late arrival)  Time Out: 11:45 AM   Total Appointment Time (timed & untimed codes): 34 minutes    Precautions: Standard and Fall    Subjective   Date of onset: 3 weeks ago  History of current condition - Gerda reports: pt is s/p L TKA 3 weeks ago. Pt states she was in the hospital for 2 days and then went to an assisted living facility for ~ 3 weeks where she received PT. Pt was discharged home this past Friday. Pt states she feels she is doing pretty well, and he MD has referred her to outpatient PT     Medical History:   Past Medical History:   Diagnosis Date    Anxiety     Arthritis     Basal cell carcinoma 09/2016    right post auricular neck     Breast cancer 1992    right    Cataract     Fibromyalgia     History of measles as a child     Hyperlipidemia     Hypertension     Personal history of colonic polyps     Pneumonia     SCC (squamous cell carcinoma) 2015    R chest    SCC (squamous cell carcinoma) 2016    left medial shoulder    SCC (squamous cell carcinoma) 2017    left knee    Shingles     Squamous cell carcinoma 2015    right forearm    Thyroid disease     Vaginitis        Surgical  History:   Gerda Lai  has a past surgical history that includes thyriodectomy; Total hip arthroplasty; tonsillectomy; Adenoidectomy; Cataract extraction w/  intraocular lens implant (Bilateral); Yag  (Left); Breast lumpectomy (Right, 1992); Breast biopsy (Left); Eye surgery; Tonsillectomy; Joint replacement (Right); and Knee Arthroplasty (Left, 8/10/2020).    Medications:   Gerda has a current medication list which includes the following prescription(s): acetaminophen, albuterol, alendronate, aliskiren, aspirin, coenzyme q10, docusate sodium, doxazosin, felodipine, glucosamine-chondroitin, ipratropium, labetalol, lorazepam, multivitamin, omeprazole, polymyxin b sulf-trimethoprim, pravastatin, trolamine salicylate, and turmeric.    Allergies:   Review of patient's allergies indicates:   Allergen Reactions    Sulfa (sulfonamide antibiotics) Rash    Clarithromycin Other (See Comments)     Weak, extreme fatigue. dizziness    Flexeril [cyclobenzaprine] Other (See Comments)     Dizziness    Iodine and iodide containing products     Lisinopril Other (See Comments)     Cough and sensation of throat swelling/?angioedema    Losartan Rash    Metoprolol Swelling     Tightness in throat    Tramadol Other (See Comments)     Dizzy and weak    Verapamil (bulk) Palpitations    Voltaren [diclofenac sodium] Other (See Comments)     Drops blood pressure        Imaging, bone scan films: Immediate postop changes of total left knee arthroplasty.  No immediate complication identified.    Prior Therapy: yes OP PT prior and PT in subacute  Social History: pt lives alone but she has a lot of help from family; One step to enter and one level inside. Pt has Shower chair, RW, quad cane, commode.   Occupation: Retired.  Prior Level of Function: prior to surgery pt was using the cane to walk. Pt as doing exercises at home prior to surgery  Current Level of Function: pt is walking with RW now. Pt has some difficulty with putting on  "socks and shoes. Pt has R knee pain now.    Pain:  Current 0/10, worst 3/10, best 0/10   Location: left knee and lateral thigh (pt states she was told it's from the tourniquet) pt armin endorses pain near L peroneal tendon  Description: soreness  Aggravating Factors: Sitting or being immobile too long, bending  Easing Factors: rest and getting up and moving, aspercreme     Pts goals: pt wants to be more confident about walking up the steps. "I want to walk by myself."    Objective     Observation: pt received amb c/ RW    Knee incision is clean, dry intact    Posture: rounded shoulders, forward head      Range of Motion:   Knee Left active Right Active   Flexion 110 118   Extension -3 0           Lower Extremity Strength (Seated)  Right LE  Left LE    Knee extension: 5/5 Knee extension: 4/5   Knee flexion: 5/5 Knee flexion: 5/5   Hip flexion: 5/5 Hip flexion: 4-/5   Hip extension:  >/=4/5 Hip extension: >/=4/5   Hip abduction: 5/5 Hip abduction: 4+/5   Hip adduction: 5/5 Hip adduction 5/5   Ankle dorsiflexion: 5/5 Ankle dorsiflexion: 5/5   Ankle plantarflexion: 5/5 Ankle plantarflexion: 5/5     Function:    - Sit <--> Stand:Mod I   - Bed Mobility: Mod I    Palpation: some tenderness at L lateral thigh    Sensation: light touch intact    Flexibility: tight hamstrings and gastroc on left side       Edema: localized edema noted at L knee     Girth Measurement Joint line 10 cm below 10 cm above   Left 41 cm 40 cm 44 cm   Right 42.5 cm 41 cm 45.5 cm     Gait: PT increases height of RW, which pt states feels better. Pt with fair rupinder, decreased L stance time and decreased L terminal knee ext.    Home Exercises and Patient Education Provided    Education provided:   -pt encouraged to elevate LLE and perform ankle pumps t/o the day. Continue use of ice to knee, especially at end of day, quad set with towel, POC, scheduling    Written Home Exercises Provided: will provide at next session- limited due to pt's late " arrival.      Assessment   Gerda is a 87 y.o. female referred to outpatient Physical Therapy with a medical diagnosis of Status post knee replacement, unspecified laterality. Pt presents with L knee pain, decreased strength, poor posture, decreased ROM, impaired gait, decreased endurance, impaired functional mobility.      Pt prognosis is Good.   Pt will benefit from skilled outpatient Physical Therapy to address the deficits stated above and in the chart below, provide pt/family education, and to maximize pt's level of independence.     Plan of care discussed with patient: Yes  Pt's spiritual, cultural and educational needs considered and patient is agreeable to the plan of care and goals as stated below:     Anticipated Barriers for therapy: advanced age    Medical Necessity is demonstrated by the following  History  Co-morbidities and personal factors that may impact the plan of care Co-morbidities:   advanced age, history of cancer and HTN    Personal Factors:   no deficits     high   Examination  Body Structures and Functions, activity limitations and participation restrictions that may impact the plan of care Body Regions:   lower extremities  trunk    Body Systems:    ROM  strength  balance  gait  transfers  transitions  motor control  edema    Participation Restrictions:   None stated    Activity limitations:   Learning and applying knowledge  no deficits    General Tasks and Commands  no deficits    Communication  no deficits    Mobility  lifting and carrying objects  walking    Self care  dressing    Domestic Life  shopping  cooking  doing house work (cleaning house, washing dishes, laundry)    Interactions/Relationships  no deficits    Life Areas  no deficits    Community and Social Life  community life  recreation and leisure         high   Clinical Presentation stable and uncomplicated low   Decision Making/ Complexity Score: low     Goals:  Short Term Goals: 4 weeks   1. Pt will tolerate HEP for  improved strength, functional mobility, ROM, posture, and endurance. (progressing, not met)  2. Pt will report reduced L knee pain to </= 1/10 for improved functional mobility and ability to participate in functional activities/ADL's. (progressing, not met)  3. Pt will increase L knee ext AROM to 0 degrees for improved functional mobility and gait mechanics. (progressing, not met)  4. Pt will increase L knee flexion AROM to >/= 120 degrees for improved functional mobility and gait mechanics. (progressing, not met)  5. Pt will demo >/= 4+/5 strength in BLE's for improved functional mobility, endurance, and posture. (progressing, not met)  6. Pt will report improved ability to put on shoes and socks without increase in pain/symptoms for improved functional mobility (progressing, not met)  7. Pt will amb x >/=500 feet with Mod I and least restrictive device for improved function and community mobility (progressing, not met)    Long Term Goals: 8 weeks   1. Pt will be I with updated HEP for improved functional mobility, posture, strength, and endurance. (progressing, not met)    2. Pt will demo 5/5 strength in BLE's for improved functional mobility, endurance, and posture. (progressing, not met)  3. Pt will negotiate 1 flight of stairs with Mod I and without pain/symptoms for improved functional mobility (progressing, not met)  4.      Pt will amb x >/=300  feet with Mod I and no  device for improved function and community mobility (progressing, not met)  5. Pt will reduced L knee circumference measurements by 1 cm each improved functional mobility and gait mechanics. (progressing, not met)      Plan   Plan of care Certification: 9/2/2020 to 10/30/20.    Outpatient Physical Therapy 2 times weekly for 8 weeks to include the following interventions: Gait Training, Manual Therapy, Moist Heat/ Ice, Neuromuscular Re-ed, Orthotic Management and Training, Patient Education, Self Care, Therapeutic Activites, Therapeutic  Exercise and modalities as appropriate.     Cristina Meadows, PT

## 2020-09-04 ENCOUNTER — CLINICAL SUPPORT (OUTPATIENT)
Dept: REHABILITATION | Facility: HOSPITAL | Age: 85
End: 2020-09-04
Attending: INTERNAL MEDICINE
Payer: MEDICARE

## 2020-09-04 DIAGNOSIS — R53.1 DECREASED STRENGTH: ICD-10-CM

## 2020-09-04 DIAGNOSIS — Z74.09 IMPAIRED FUNCTIONAL MOBILITY, BALANCE, GAIT, AND ENDURANCE: ICD-10-CM

## 2020-09-04 DIAGNOSIS — M25.562 ACUTE PAIN OF LEFT KNEE: ICD-10-CM

## 2020-09-04 DIAGNOSIS — M25.662 DECREASED RANGE OF MOTION (ROM) OF LEFT KNEE: ICD-10-CM

## 2020-09-04 PROCEDURE — 97110 THERAPEUTIC EXERCISES: CPT | Mod: PO

## 2020-09-04 NOTE — PROGRESS NOTES
Physical Therapy Treatment Note     Name: Gerda Lai  Clinic Number: [id number]    Therapy Diagnosis:   Encounter Diagnoses   Name Primary?    Decreased range of motion (ROM) of left knee     Impaired functional mobility, balance, gait, and endurance     Acute pain of left knee     Decreased strength      Physician: Stacy Harris NP    Visit Date: 9/4/2020    Physician Orders: PT Eval and Treat S/p left knee replacement by Dr. Pacheco. She has completed home health and is ready to proceed with outpatient PT  Medical Diagnosis from Referral: Status post knee replacement, unspecified laterality  Evaluation Date: 9/2/2020  Authorization Period Expiration: 8/27/21  Plan of Care Expiration: 10/30/20  Visit # / Visits authorized: 1/20 (plus evaluation)    Time In: 8:55 AM (late arrival)  Time Out: 9:30 AM  Total Billable Time: 35 minutes    Precautions: Standard and Fall    Subjective     Pt reports: No new complaints.  She  Did not receive an HEP on Eval  Response to previous treatment: no adverse reaction  Functional change: none stated    Pain: 2/10  Location: left knee      Objective     Gerda received therapeutic exercises to develop strength, endurance, ROM, flexibility and posture for 35 minutes including:  L quad set, 3 x 10 reps  L heel slides with strap, 10 x 5s  L gastroc stretch, 3 x 30s  L Hamstring stretch, 3 x 30s  Hip ADD squeeze, 2 x 10 reps  Hip ABD, orange band, 25 reps  Seated hamstring curl, 2 x 10 reps, orange band  Standing hip Ext, LLE only, 2 x 10 reps  Roller to L quad x 2 min          Home Exercises Provided and Patient Education Provided     Education provided:   - HEP, exercise technqiue    Written Home Exercises Provided: yes.  Exercises were reviewed and Gerda was able to demonstrate them prior to the end of the session.  Gerda demonstrated good  understanding of the education provided.     See EMR under Patient Instructions for exercises provided 9/4/2020.    Assessment     Pt  tolerated first follow-up well. She was able to complete all exercises without issue. Focus of today's session was to provide pt with home routine. Will progress as tolerated.    Gerda is progressing well towards her goals.   Pt prognosis is Good.     Pt will continue to benefit from skilled outpatient physical therapy to address the deficits listed in the problem list box on initial evaluation, provide pt/family education and to maximize pt's level of independence in the home and community environment.     Pt's spiritual, cultural and educational needs considered and pt agreeable to plan of care and goals.     Anticipated barriers to physical therapy: advanced age    Goals:   Short Term Goals: 4 weeks   1. Pt will tolerate HEP for improved strength, functional mobility, ROM, posture, and endurance. (progressing, not met)  2. Pt will report reduced L knee pain to </= 1/10 for improved functional mobility and ability to participate in functional activities/ADL's. (progressing, not met)  3. Pt will increase L knee ext AROM to 0 degrees for improved functional mobility and gait mechanics. (progressing, not met)  4. Pt will increase L knee flexion AROM to >/= 120 degrees for improved functional mobility and gait mechanics. (progressing, not met)  5. Pt will demo >/= 4+/5 strength in BLE's for improved functional mobility, endurance, and posture. (progressing, not met)  6. Pt will report improved ability to put on shoes and socks without increase in pain/symptoms for improved functional mobility (progressing, not met)  7. Pt will amb x >/=500 feet with Mod I and least restrictive device for improved function and community mobility (progressing, not met)     Long Term Goals: 8 weeks   1. Pt will be I with updated HEP for improved functional mobility, posture, strength, and endurance. (progressing, not met)     2. Pt will demo 5/5 strength in BLE's for improved functional mobility, endurance, and posture. (progressing,  not met)  3. Pt will negotiate 1 flight of stairs with Mod I and without pain/symptoms for improved functional mobility (progressing, not met)  4.      Pt will amb x >/=300  feet with Mod I and no  device for improved function and community mobility (progressing, not met)  5. Pt will reduced L knee circumference measurements by 1 cm each improved functional mobility and gait mechanics. (progressing, not met)        Plan   Plan of care Certification: 9/2/2020 to 10/30/20.     Outpatient Physical Therapy 2 times weekly for 8 weeks to include the following interventions: Gait Training, Manual Therapy, Moist Heat/ Ice, Neuromuscular Re-ed, Orthotic Management and Training, Patient Education, Self Care, Therapeutic Activites, Therapeutic Exercise and modalities as appropriate.       Cristina Meadows, PT

## 2020-09-08 ENCOUNTER — CLINICAL SUPPORT (OUTPATIENT)
Dept: REHABILITATION | Facility: HOSPITAL | Age: 85
End: 2020-09-08
Attending: INTERNAL MEDICINE
Payer: MEDICARE

## 2020-09-08 DIAGNOSIS — Z74.09 IMPAIRED FUNCTIONAL MOBILITY, BALANCE, GAIT, AND ENDURANCE: ICD-10-CM

## 2020-09-08 DIAGNOSIS — M25.562 ACUTE PAIN OF LEFT KNEE: ICD-10-CM

## 2020-09-08 DIAGNOSIS — M25.662 DECREASED RANGE OF MOTION (ROM) OF LEFT KNEE: ICD-10-CM

## 2020-09-08 DIAGNOSIS — R53.1 DECREASED STRENGTH: ICD-10-CM

## 2020-09-08 PROCEDURE — 97110 THERAPEUTIC EXERCISES: CPT | Mod: PO

## 2020-09-08 PROCEDURE — 97140 MANUAL THERAPY 1/> REGIONS: CPT | Mod: PO

## 2020-09-10 ENCOUNTER — CLINICAL SUPPORT (OUTPATIENT)
Dept: REHABILITATION | Facility: HOSPITAL | Age: 85
End: 2020-09-10
Attending: INTERNAL MEDICINE
Payer: MEDICARE

## 2020-09-10 DIAGNOSIS — M25.662 DECREASED RANGE OF MOTION (ROM) OF LEFT KNEE: ICD-10-CM

## 2020-09-10 DIAGNOSIS — Z74.09 IMPAIRED FUNCTIONAL MOBILITY, BALANCE, GAIT, AND ENDURANCE: ICD-10-CM

## 2020-09-10 DIAGNOSIS — R53.1 DECREASED STRENGTH: ICD-10-CM

## 2020-09-10 DIAGNOSIS — M25.562 ACUTE PAIN OF LEFT KNEE: ICD-10-CM

## 2020-09-10 PROCEDURE — 97110 THERAPEUTIC EXERCISES: CPT | Mod: PO

## 2020-09-10 NOTE — PROGRESS NOTES
"  Physical Therapy Treatment Note     Name: Gerda Lai  Clinic Number: [id number]    Therapy Diagnosis:   Encounter Diagnoses   Name Primary?    Decreased range of motion (ROM) of left knee     Impaired functional mobility, balance, gait, and endurance     Acute pain of left knee     Decreased strength      Physician: Stacy Harris NP    Visit Date: 9/10/2020    Physician Orders: PT Eval and Treat S/p left knee replacement by Dr. Pacheco. She has completed home health and is ready to proceed with outpatient PT  Medical Diagnosis from Referral: Status post knee replacement, unspecified laterality  Evaluation Date: 9/2/2020  Authorization Period Expiration: 8/27/21  Plan of Care Expiration: 10/30/20  Visit # / Visits authorized: 3/20 (plus evaluation)    Time In: 1:30  PM  Time Out:2:18PM  Total Billable Time: 40  minutes    Precautions: Standard and Fall    Subjective     Pt reports: No new complaints. Has a little pain today. Pt states she couldn't find th bump she had when she got home on L thigh, so she didn't send a picture to her MD. "I'll let him know the next time I see him  She  Has been compliant with HEP  Response to previous treatment: no adverse reaction  Functional change: none stated    Pain: 1/10  Location: left knee      Objective         Gerda received therapeutic exercises to develop strength, endurance, ROM, flexibility and posture for 40 minutes including:  Upright bike, level 1, 6 min  Gastroc and soleus stretch on incline,  x 1min each  Bridge with ball, 2 x 12reps  B SAQ with 2#, 3 x 10 reps  SLR, 10/LE  Pt attempts side-lying Hip ABD, but endorses difficulty  Hip ABD, hook-lying, green band, 30 reps  Hamstring stretch c/ strap, 2 x 30s/LE  Roller to L quad  And ITBx 3 min            Not performed today:    Seated hamstring curl, 2 x 10 reps, orange band  Standing hip Ext, LLE only, 2 x 10 reps    L heel slides with strap, 10 x 5s        Home Exercises Provided and Patient Education " Provided     Education provided:   - HEP, exercise technique, ice 2-3x/day,    Written Home Exercises Provided: yes.  Exercises were reviewed and Gerda was able to demonstrate them prior to the end of the session.  Gerda demonstrated good  understanding of the education provided.     See EMR under Patient Instructions for exercises provided 9/10/20.    Assessment     Pt tolerated session well. Pt able to progress to SLR and upright bike, as well as reps of exercises. Bump on lateral thigh has improved- appears to be area of muscular knot/tighntess.  Will progress as tolerated.    Gerda is progressing well towards her goals.   Pt prognosis is Good.     Pt will continue to benefit from skilled outpatient physical therapy to address the deficits listed in the problem list box on initial evaluation, provide pt/family education and to maximize pt's level of independence in the home and community environment.     Pt's spiritual, cultural and educational needs considered and pt agreeable to plan of care and goals.     Anticipated barriers to physical therapy: advanced age    Goals:   Short Term Goals: 4 weeks   1. Pt will tolerate HEP for improved strength, functional mobility, ROM, posture, and endurance. (progressing, not met)  2. Pt will report reduced L knee pain to </= 1/10 for improved functional mobility and ability to participate in functional activities/ADL's. (progressing, not met)  3. Pt will increase L knee ext AROM to 0 degrees for improved functional mobility and gait mechanics. (progressing, not met)  4. Pt will increase L knee flexion AROM to >/= 120 degrees for improved functional mobility and gait mechanics. (progressing, not met)  5. Pt will demo >/= 4+/5 strength in BLE's for improved functional mobility, endurance, and posture. (progressing, not met)  6. Pt will report improved ability to put on shoes and socks without increase in pain/symptoms for improved functional mobility (progressing, not  met)  7. Pt will amb x >/=500 feet with Mod I and least restrictive device for improved function and community mobility (progressing, not met)     Long Term Goals: 8 weeks   1. Pt will be I with updated HEP for improved functional mobility, posture, strength, and endurance. (progressing, not met)     2. Pt will demo 5/5 strength in BLE's for improved functional mobility, endurance, and posture. (progressing, not met)  3. Pt will negotiate 1 flight of stairs with Mod I and without pain/symptoms for improved functional mobility (progressing, not met)  4.      Pt will amb x >/=300  feet with Mod I and no  device for improved function and community mobility (progressing, not met)  5. Pt will reduced L knee circumference measurements by 1 cm each improved functional mobility and gait mechanics. (progressing, not met)        Plan   Plan of care Certification: 9/2/2020 to 10/30/20.     Outpatient Physical Therapy 2 times weekly for 8 weeks to include the following interventions: Gait Training, Manual Therapy, Moist Heat/ Ice, Neuromuscular Re-ed, Orthotic Management and Training, Patient Education, Self Care, Therapeutic Activites, Therapeutic Exercise and modalities as appropriate.     Incorporate standing exercises, especially Hip ABD as tolerated.      Cristina Meadows, PT

## 2020-09-14 ENCOUNTER — TELEPHONE (OUTPATIENT)
Dept: ORTHOPEDICS | Facility: CLINIC | Age: 85
End: 2020-09-14

## 2020-09-14 ENCOUNTER — CLINICAL SUPPORT (OUTPATIENT)
Dept: REHABILITATION | Facility: HOSPITAL | Age: 85
End: 2020-09-14
Attending: INTERNAL MEDICINE
Payer: MEDICARE

## 2020-09-14 DIAGNOSIS — R53.1 DECREASED STRENGTH: ICD-10-CM

## 2020-09-14 DIAGNOSIS — M25.562 ACUTE PAIN OF LEFT KNEE: ICD-10-CM

## 2020-09-14 DIAGNOSIS — Z74.09 IMPAIRED FUNCTIONAL MOBILITY, BALANCE, GAIT, AND ENDURANCE: ICD-10-CM

## 2020-09-14 DIAGNOSIS — M25.662 DECREASED RANGE OF MOTION (ROM) OF LEFT KNEE: ICD-10-CM

## 2020-09-14 PROCEDURE — 97110 THERAPEUTIC EXERCISES: CPT | Mod: PO

## 2020-09-14 NOTE — TELEPHONE ENCOUNTER
Spoke with patient she stated she spoke with the coleman in therapy he stated that its probably the healing process that's causing the little pain that she having. Patient stated she will give us a call back if the pain continues.                ----- Message from Steven Castro sent at 9/14/2020 11:20 AM CDT -----  Contact: pt  Please call pt at [phone]    Patient is having left hip & leg pain for one week    Medication is not helping at all     Thank you

## 2020-09-14 NOTE — PROGRESS NOTES
Physical Therapy Treatment Note     Name: Gerda Lai  Clinic Number: [id number]    Therapy Diagnosis:   Encounter Diagnoses   Name Primary?    Decreased range of motion (ROM) of left knee     Impaired functional mobility, balance, gait, and endurance     Acute pain of left knee     Decreased strength      Physician: Stacy Harris NP    Visit Date: 9/14/2020    Physician Orders: PT Eval and Treat S/p left knee replacement by Dr. Pacheco. She has completed home health and is ready to proceed with outpatient PT  Medical Diagnosis from Referral: Status post knee replacement, unspecified laterality  Evaluation Date: 9/2/2020  Authorization Period Expiration: 8/27/21  Plan of Care Expiration: 10/30/20  Visit # / Visits authorized: 4/20 (plus evaluation)    Time In: 1245  Time Out:135  Total Billable Time: 40 minutes  (3TE)    Precautions: Standard and Fall    Subjective     Pt reports:  That she is a little concerned about her swelling in the leg. And that she has been having discomfort in her hip on the involved side.   She  Has been compliant with HEP  Response to previous treatment: no adverse reaction  Functional change: none stated    Pain: 1/10  Location: left knee      Objective         Gerda received therapeutic exercises to develop strength, endurance, ROM, flexibility and posture for 40 minutes including:  Upright bike, level 1, 8 min  Gastroc and soleus stretch on incline,  x 1min each  Bridge with ball, [id number]  B SAQ with 2#, 3 x 10 reps  SLR, 1x10 each   Pt attempts side-lying Hip ABD, but endorses difficulty  Hip ABD, hook-lying, green band, 30 reps  Hamstring stretch c/ strap, 3 x 30s/LE  Roller to L quad  And ITBx 3 min    Pt received 10 minutes of cold pack to involved knee for edema control and pain relief         Not performed today:    Seated hamstring curl, 2 x 10 reps, orange band  Standing hip Ext, LLE only, 2 x 10 reps    L heel slides with strap, 10 x 5s        Home Exercises Provided  and Patient Education Provided     Education provided:   - HEP, exercise technique, ice 2-3x/day,    Written Home Exercises Provided: yes.  Exercises were reviewed and Gerda was able to demonstrate them prior to the end of the session.  Gerda demonstrated good  understanding of the education provided.     See EMR under Patient Instructions for exercises provided 9/10/20.    Assessment     Pt tolerated tx session very well today. Pt with proper muscle fatigue with all exercises, but pinching felt anterior hip during straight leg raises. Pt to continue to progress as tolerated. Potential introduction of more LE flexibility exercises next visit.    Gerda is progressing well towards her goals.   Pt prognosis is Good.     Pt will continue to benefit from skilled outpatient physical therapy to address the deficits listed in the problem list box on initial evaluation, provide pt/family education and to maximize pt's level of independence in the home and community environment.     Pt's spiritual, cultural and educational needs considered and pt agreeable to plan of care and goals.     Anticipated barriers to physical therapy: advanced age    Goals:   Short Term Goals: 4 weeks   1. Pt will tolerate HEP for improved strength, functional mobility, ROM, posture, and endurance. (progressing, not met)  2. Pt will report reduced L knee pain to </= 1/10 for improved functional mobility and ability to participate in functional activities/ADL's. (progressing, not met)  3. Pt will increase L knee ext AROM to 0 degrees for improved functional mobility and gait mechanics. (progressing, not met)  4. Pt will increase L knee flexion AROM to >/= 120 degrees for improved functional mobility and gait mechanics. (progressing, not met)  5. Pt will demo >/= 4+/5 strength in BLE's for improved functional mobility, endurance, and posture. (progressing, not met)  6. Pt will report improved ability to put on shoes and socks without increase in  pain/symptoms for improved functional mobility (progressing, not met)  7. Pt will amb x >/=500 feet with Mod I and least restrictive device for improved function and community mobility (progressing, not met)     Long Term Goals: 8 weeks   1. Pt will be I with updated HEP for improved functional mobility, posture, strength, and endurance. (progressing, not met)     2. Pt will demo 5/5 strength in BLE's for improved functional mobility, endurance, and posture. (progressing, not met)  3. Pt will negotiate 1 flight of stairs with Mod I and without pain/symptoms for improved functional mobility (progressing, not met)  4.      Pt will amb x >/=300  feet with Mod I and no  device for improved function and community mobility (progressing, not met)  5. Pt will reduced L knee circumference measurements by 1 cm each improved functional mobility and gait mechanics. (progressing, not met)        Plan   Plan of care Certification: 9/2/2020 to 10/30/20.     Outpatient Physical Therapy 2 times weekly for 8 weeks to include the following interventions: Gait Training, Manual Therapy, Moist Heat/ Ice, Neuromuscular Re-ed, Orthotic Management and Training, Patient Education, Self Care, Therapeutic Activites, Therapeutic Exercise and modalities as appropriate.     Incorporate standing exercises, especially Hip ABD as tolerated.      Jesus Almonte, PT

## 2020-09-15 ENCOUNTER — PATIENT OUTREACH (OUTPATIENT)
Dept: OTHER | Facility: OTHER | Age: 85
End: 2020-09-15

## 2020-09-15 NOTE — PROGRESS NOTES
Digital Medicine: Clinician Follow-Up    Called Gerda Lai for hypertension follow-up: no readings since 8/3/20.    The history is provided by the patient.   Follow-up reason(s): routine follow up.     Hypertension  Patient needs assistance troubleshooting: Patient reminder - recovering from knee replacment surgery.    Additional Follow-up details: Ms. Lorenz has returned home from the Assistance Living facility. She has not measured her blood pressure due to focusing on her recovery. Patient reminder provided. Attending rehab 2 days a week.        Last 5 Patient Entered Readings                                      Current 30 Day Average:      Recent Readings 8/3/2020 8/3/2020 8/1/2020 8/1/2020 7/22/2020    SBP (mmHg) 156 156 201 201 177    DBP (mmHg) 72 72 79 79 68    Pulse 63 63 69 69 71                 Depression Screening  Did not address depression screening.    Sleep Apnea Screening    Did not address sleep apnea screening.     Medication Affordability Screening  Did not address medication affordability screening.     Medication Adherence-Medication adherence was assessed.          ASSESSMENT(S)  No BP readings over the past month due to knee replacement surgery and living in an assistance living facility who was monitoring her BP for her during her stay.   No medication changes recommended at this time. Encouraged patient to charge device and resume measuring BP at home. She attends Physical Therapy two days a week, will ask for BP measure at these visits.      Hypertension Plan  Continue current therapy.  Continue current diet/physical activity routine.       Addressed patient questions and patient has my contact information if needed prior to next outreach. Patient verbalizes understanding.            There are no preventive care reminders to display for this patient.  There are no preventive care reminders to display for this patient.    Hypertension Medications             aliskiren (TEKTURNA) 300 MG  Tab Take 1 tablet (300 mg total) by mouth once daily.    doxazosin (CARDURA) 1 MG tablet Take 1 tablet (1 mg total) by mouth once daily.    felodipine (PLENDIL) 2.5 MG Tb24 TAKE ONE TABLET IN THE MORNING AND ONE TABLET IN THE EVENING    labetaloL (NORMODYNE) 100 MG tablet Take 1 tablet (100 mg total) by mouth every 12 (twelve) hours. TAKE 2 TABLETS (200 MG TOTAL) BY MOUTH EVERY 12 (TWELVE) HOURS.

## 2020-09-16 NOTE — PROGRESS NOTES
Physical Therapy Treatment Note     Name: Gerda Lai  Clinic Number: [id number]    Therapy Diagnosis:   Encounter Diagnoses   Name Primary?    Decreased range of motion (ROM) of left knee     Impaired functional mobility, balance, gait, and endurance     Acute pain of left knee     Decreased strength      Physician: Stacy Harris NP    Visit Date: 9/17/2020    Physician Orders: PT Eval and Treat S/p left knee replacement by Dr. Pacheco. She has completed home health and is ready to proceed with outpatient PT  Medical Diagnosis from Referral: Status post knee replacement, unspecified laterality  Evaluation Date: 9/2/2020  Authorization Period Expiration: 8/27/21  Plan of Care Expiration: 10/30/20  Visit # / Visits authorized: 5/20 (plus evaluation)    Time In: 1:30  Time Out: 2:15   Total Billable Time: 45 minutes  (3TE)    Precautions: Standard and Fall    Subjective     Pt reports: she wishes she was further along with the knee.   She  Has been compliant with HEP  Response to previous treatment: no adverse reaction  Functional change: none stated    Pain: 1/10  Location: left knee      Objective         Gerda received therapeutic exercises to develop strength, endurance, ROM, flexibility and posture for 45 minutes including:    Upright bike, level 1, 8 min  Gastroc and soleus stretch on incline,  x 1min each  Bridge with ball, [id number]  B SAQ with 2#, 3 x 10 reps  SLR, 1x10 each   Pt attempts side-lying Hip ABD, but endorses difficulty  Hip ABD, hook-lying, green band, 30 reps  Hamstring stretch c/ strap, 3 x 30s/LE  Standing hip abduction 2 x 10/LE  Mini squats x 12   Roller to L quad  And ITBx 3 min          Not performed today:  Pt received 10 minutes of cold pack to involved knee for edema control and pain relief     Seated hamstring curl, 2 x 10 reps, orange band  Standing hip Ext, LLE only, 2 x 10 reps  L heel slides with strap, 10 x 5s        Home Exercises Provided and Patient Education Provided      Education provided:   - HEP, exercise technique, ice 2-3x/day,    Written Home Exercises Provided: yes.  Exercises were reviewed and Gerda was able to demonstrate them prior to the end of the session.  Gerda demonstrated good  understanding of the education provided.     See EMR under Patient Instructions for exercises provided 9/10/20.    Assessment     Patient completed exercises above without increase in pain or discomfort, still challenged, especially with straight leg raises. A few standing exercises added this treatment to improve functional mobility. Continue to progress as tolerated.     Gerda is progressing well towards her goals.   Pt prognosis is Good.     Pt will continue to benefit from skilled outpatient physical therapy to address the deficits listed in the problem list box on initial evaluation, provide pt/family education and to maximize pt's level of independence in the home and community environment.     Pt's spiritual, cultural and educational needs considered and pt agreeable to plan of care and goals.     Anticipated barriers to physical therapy: advanced age    Goals:   Short Term Goals: 4 weeks   1. Pt will tolerate HEP for improved strength, functional mobility, ROM, posture, and endurance. (progressing, not met)  2. Pt will report reduced L knee pain to </= 1/10 for improved functional mobility and ability to participate in functional activities/ADL's. (progressing, not met)  3. Pt will increase L knee ext AROM to 0 degrees for improved functional mobility and gait mechanics. (progressing, not met)  4. Pt will increase L knee flexion AROM to >/= 120 degrees for improved functional mobility and gait mechanics. (progressing, not met)  5. Pt will demo >/= 4+/5 strength in BLE's for improved functional mobility, endurance, and posture. (progressing, not met)  6. Pt will report improved ability to put on shoes and socks without increase in pain/symptoms for improved functional mobility  (progressing, not met)  7. Pt will amb x >/=500 feet with Mod I and least restrictive device for improved function and community mobility (progressing, not met)     Long Term Goals: 8 weeks   1. Pt will be I with updated HEP for improved functional mobility, posture, strength, and endurance. (progressing, not met)     2. Pt will demo 5/5 strength in BLE's for improved functional mobility, endurance, and posture. (progressing, not met)  3. Pt will negotiate 1 flight of stairs with Mod I and without pain/symptoms for improved functional mobility (progressing, not met)  4.      Pt will amb x >/=300  feet with Mod I and no  device for improved function and community mobility (progressing, not met)  5. Pt will reduced L knee circumference measurements by 1 cm each improved functional mobility and gait mechanics. (progressing, not met)        Plan   Plan of care Certification: 9/2/2020 to 10/30/20.     Outpatient Physical Therapy 2 times weekly for 8 weeks to include the following interventions: Gait Training, Manual Therapy, Moist Heat/ Ice, Neuromuscular Re-ed, Orthotic Management and Training, Patient Education, Self Care, Therapeutic Activites, Therapeutic Exercise and modalities as appropriate.     Incorporate standing exercises, especially Hip ABD as tolerated.      Rebecca Dubose, PTA

## 2020-09-17 ENCOUNTER — CLINICAL SUPPORT (OUTPATIENT)
Dept: REHABILITATION | Facility: HOSPITAL | Age: 85
End: 2020-09-17
Attending: INTERNAL MEDICINE
Payer: MEDICARE

## 2020-09-17 DIAGNOSIS — M25.662 DECREASED RANGE OF MOTION (ROM) OF LEFT KNEE: ICD-10-CM

## 2020-09-17 DIAGNOSIS — Z74.09 IMPAIRED FUNCTIONAL MOBILITY, BALANCE, GAIT, AND ENDURANCE: ICD-10-CM

## 2020-09-17 DIAGNOSIS — M25.562 ACUTE PAIN OF LEFT KNEE: ICD-10-CM

## 2020-09-17 DIAGNOSIS — R53.1 DECREASED STRENGTH: ICD-10-CM

## 2020-09-17 PROCEDURE — 97110 THERAPEUTIC EXERCISES: CPT | Mod: PO,CQ

## 2020-09-18 DIAGNOSIS — I10 HYPERTENSION, ESSENTIAL: Chronic | ICD-10-CM

## 2020-09-18 RX ORDER — LORAZEPAM 0.5 MG/1
.25-.5 TABLET ORAL EVERY 12 HOURS PRN
Qty: 30 TABLET | Refills: 0 | Status: SHIPPED | OUTPATIENT
Start: 2020-09-18 | End: 2020-10-26

## 2020-09-18 NOTE — TELEPHONE ENCOUNTER
LORazepam (ATIVAN) 0.5 MG tablet          Sig: TAKE 0.5-1 TABLETS (0.25-0.5 MG TOTAL) BY MOUTH EVERY 12 (TWELVE) HOURS AS NEEDED FOR ANXIETY.    Disp:  30 tablet    Refills:  0    Start: 9/18/2020    Class: Normal    Authorized by: Tomas Beltran MD    Non-formulary For: Hypertension, essential    To pharmacy: Not to exceed 5 additional fills before 02/13/2021        To be filled at: [id number] - [place], [place] - [address]     I tried calling pt regarding Rx above. No answer. Left VM Rx was sent to her pharmacy

## 2020-09-22 ENCOUNTER — LAB VISIT (OUTPATIENT)
Dept: LAB | Facility: HOSPITAL | Age: 85
End: 2020-09-22
Attending: INTERNAL MEDICINE
Payer: MEDICARE

## 2020-09-22 DIAGNOSIS — I10 HYPERTENSION, ESSENTIAL: ICD-10-CM

## 2020-09-22 DIAGNOSIS — E78.00 PURE HYPERCHOLESTEROLEMIA: ICD-10-CM

## 2020-09-22 LAB
ALBUMIN SERPL BCP-MCNC: 4.1 G/DL (ref 3.5–5.2)
ALP SERPL-CCNC: 78 U/L (ref 55–135)
ALT SERPL W/O P-5'-P-CCNC: 13 U/L (ref 10–44)
ANION GAP SERPL CALC-SCNC: 10 MMOL/L (ref 8–16)
AST SERPL-CCNC: 17 U/L (ref 10–40)
BILIRUB SERPL-MCNC: 0.7 MG/DL (ref 0.1–1)
BUN SERPL-MCNC: 31 MG/DL (ref 8–23)
CALCIUM SERPL-MCNC: 9.9 MG/DL (ref 8.7–10.5)
CHLORIDE SERPL-SCNC: 106 MMOL/L (ref 95–110)
CHOLEST SERPL-MCNC: 164 MG/DL (ref 120–199)
CHOLEST/HDLC SERPL: 3 {RATIO} (ref 2–5)
CO2 SERPL-SCNC: 25 MMOL/L (ref 23–29)
CREAT SERPL-MCNC: 1 MG/DL (ref 0.5–1.4)
EST. GFR  (AFRICAN AMERICAN): 58.5 ML/MIN/1.73 M^2
EST. GFR  (NON AFRICAN AMERICAN): 50.8 ML/MIN/1.73 M^2
GLUCOSE SERPL-MCNC: 88 MG/DL (ref 70–110)
HDLC SERPL-MCNC: 54 MG/DL (ref 40–75)
HDLC SERPL: 32.9 % (ref 20–50)
LDLC SERPL CALC-MCNC: 84.6 MG/DL (ref 63–159)
NONHDLC SERPL-MCNC: 110 MG/DL
POTASSIUM SERPL-SCNC: 4 MMOL/L (ref 3.5–5.1)
PROT SERPL-MCNC: 6.7 G/DL (ref 6–8.4)
SODIUM SERPL-SCNC: 141 MMOL/L (ref 136–145)
TRIGL SERPL-MCNC: 127 MG/DL (ref 30–150)

## 2020-09-22 PROCEDURE — 80053 COMPREHEN METABOLIC PANEL: CPT

## 2020-09-22 PROCEDURE — 36415 COLL VENOUS BLD VENIPUNCTURE: CPT | Mod: PO

## 2020-09-22 PROCEDURE — 80061 LIPID PANEL: CPT

## 2020-09-22 NOTE — PROGRESS NOTES
Physical Therapy Treatment Note     Name: Gerda Lai  Clinic Number: [id number]    Therapy Diagnosis:   Encounter Diagnoses   Name Primary?    Decreased range of motion (ROM) of left knee     Impaired functional mobility, balance, gait, and endurance     Acute pain of left knee     Decreased strength      Physician: Stacy Harris NP    Visit Date: 9/23/2020    Physician Orders: PT Eval and Treat S/p left knee replacement by Dr. Pacheco. She has completed home health and is ready to proceed with outpatient PT  Medical Diagnosis from Referral: Status post knee replacement, unspecified laterality  Evaluation Date: 9/2/2020  Authorization Period Expiration: 8/27/21  Plan of Care Expiration: 10/30/20  Visit # / Visits authorized: 6/20 (plus evaluation)    Time In: 1:00 (patient late)  Time Out: 1:30   Total Billable Time: 30 minutes  (2TE)    Precautions: Standard and Fall    Subjective     Pt reports: right knee is bothering her more upon arrival. Minor discomfort on the lateral aspect of the left knee.   She  Has been compliant with HEP  Response to previous treatment: might have done too much   Functional change: none stated    Pain: 4/10  Location: right knee      Objective       Gerda received therapeutic exercises to develop strength, endurance, ROM, flexibility and posture for 30 minutes including:    Upright bike, level 1, 5 min  LAQ x 10/LE  Supine Hip adduction, [id number]  B SAQ with 2#, 3 x 10 reps  SLR, 2x10 LLE  Hip ABD, hook-lying, green band, 20 reps  Hip flexor stretch off mat LLE 2 x 30 seconds   Hamstring stretch c/ strap, 3 x 30s/LE        Not performed today:  Pt received 10 minutes of cold pack to involved knee for edema control and pain relief   Gastroc and soleus stretch on incline,  x 1min each  Seated hamstring curl, 2 x 10 reps, orange band  Standing hip Ext, LLE only, 2 x 10 reps  L heel slides with strap, 10 x 5s  Standing hip abduction 2 x 10/LE  Mini squats x 12   Roller to L quad   And ITBx 3 min  Pt attempts side-lying Hip ABD, but endorses difficulty      Home Exercises Provided and Patient Education Provided     Education provided:   - HEP, exercise technique, ice 2-3x/day,    Written Home Exercises Provided: yes.  Exercises were reviewed and Gerda was able to demonstrate them prior to the end of the session.  Gerda demonstrated good  understanding of the education provided.     See EMR under Patient Instructions for exercises provided 9/10/20.    Assessment     Patient arrived to session late, but reports she drove herself to her session for the first time.  Patient presents with increase in fatigue/discomfort this session, but left with decrease in pain as well as improved gait. Noted left hip flexor tightness and increase in discomfort during straight leg raises on left lower extremity. Continue to progress functional activities while improving endurance.     Gerda is progressing well towards her goals.   Pt prognosis is Good.     Pt will continue to benefit from skilled outpatient physical therapy to address the deficits listed in the problem list box on initial evaluation, provide pt/family education and to maximize pt's level of independence in the home and community environment.     Pt's spiritual, cultural and educational needs considered and pt agreeable to plan of care and goals.     Anticipated barriers to physical therapy: advanced age    Goals:   Short Term Goals: 4 weeks   1. Pt will tolerate HEP for improved strength, functional mobility, ROM, posture, and endurance. (progressing, not met)  2. Pt will report reduced L knee pain to </= 1/10 for improved functional mobility and ability to participate in functional activities/ADL's. (progressing, not met)  3. Pt will increase L knee ext AROM to 0 degrees for improved functional mobility and gait mechanics. (progressing, not met)  4. Pt will increase L knee flexion AROM to >/= 120 degrees for improved functional mobility and  gait mechanics. (progressing, not met)  5. Pt will demo >/= 4+/5 strength in BLE's for improved functional mobility, endurance, and posture. (progressing, not met)  6. Pt will report improved ability to put on shoes and socks without increase in pain/symptoms for improved functional mobility (progressing, not met)  7. Pt will amb x >/=500 feet with Mod I and least restrictive device for improved function and community mobility (progressing, not met)     Long Term Goals: 8 weeks   1. Pt will be I with updated HEP for improved functional mobility, posture, strength, and endurance. (progressing, not met)     2. Pt will demo 5/5 strength in BLE's for improved functional mobility, endurance, and posture. (progressing, not met)  3. Pt will negotiate 1 flight of stairs with Mod I and without pain/symptoms for improved functional mobility (progressing, not met)  4.      Pt will amb x >/=300  feet with Mod I and no  device for improved function and community mobility (progressing, not met)  5. Pt will reduced L knee circumference measurements by 1 cm each improved functional mobility and gait mechanics. (progressing, not met)        Plan   Plan of care Certification: 9/2/2020 to 10/30/20.     Outpatient Physical Therapy 2 times weekly for 8 weeks to include the following interventions: Gait Training, Manual Therapy, Moist Heat/ Ice, Neuromuscular Re-ed, Orthotic Management and Training, Patient Education, Self Care, Therapeutic Activites, Therapeutic Exercise and modalities as appropriate.     Incorporate standing exercises, especially Hip ABD as tolerated.      Rebecca Dubose, PTA

## 2020-09-23 ENCOUNTER — OFFICE VISIT (OUTPATIENT)
Dept: ORTHOPEDICS | Facility: CLINIC | Age: 85
End: 2020-09-23
Payer: MEDICARE

## 2020-09-23 ENCOUNTER — HOSPITAL ENCOUNTER (OUTPATIENT)
Dept: RADIOLOGY | Facility: HOSPITAL | Age: 85
Discharge: HOME | End: 2020-09-23
Attending: ORTHOPAEDIC SURGERY
Payer: MEDICARE

## 2020-09-23 ENCOUNTER — PATIENT MESSAGE (OUTPATIENT)
Dept: ADMINISTRATIVE | Facility: OTHER | Age: 85
End: 2020-09-23

## 2020-09-23 ENCOUNTER — CLINICAL SUPPORT (OUTPATIENT)
Dept: REHABILITATION | Facility: HOSPITAL | Age: 85
End: 2020-09-23
Attending: INTERNAL MEDICINE
Payer: MEDICARE

## 2020-09-23 VITALS — HEIGHT: 63 IN | WEIGHT: 141.13 LBS | BODY MASS INDEX: 25.01 KG/M2

## 2020-09-23 DIAGNOSIS — Z96.652 STATUS POST TOTAL LEFT KNEE REPLACEMENT: ICD-10-CM

## 2020-09-23 DIAGNOSIS — R53.1 DECREASED STRENGTH: ICD-10-CM

## 2020-09-23 DIAGNOSIS — Z74.09 IMPAIRED FUNCTIONAL MOBILITY, BALANCE, GAIT, AND ENDURANCE: ICD-10-CM

## 2020-09-23 DIAGNOSIS — Z96.652 STATUS POST TOTAL KNEE REPLACEMENT, LEFT: Primary | ICD-10-CM

## 2020-09-23 DIAGNOSIS — M25.562 ACUTE PAIN OF LEFT KNEE: ICD-10-CM

## 2020-09-23 DIAGNOSIS — Z96.652 STATUS POST TOTAL KNEE REPLACEMENT, LEFT: ICD-10-CM

## 2020-09-23 DIAGNOSIS — M25.662 DECREASED RANGE OF MOTION (ROM) OF LEFT KNEE: ICD-10-CM

## 2020-09-23 PROCEDURE — 73562 XR KNEE 3 VIEW LEFT: ICD-10-PCS | Mod: 26,LT,, | Performed by: INTERNAL MEDICINE

## 2020-09-23 PROCEDURE — 99024 PR POST-OP FOLLOW-UP VISIT: ICD-10-PCS | Mod: POP,,, | Performed by: ORTHOPAEDIC SURGERY

## 2020-09-23 PROCEDURE — 99024 POSTOP FOLLOW-UP VISIT: CPT | Mod: POP,,, | Performed by: ORTHOPAEDIC SURGERY

## 2020-09-23 PROCEDURE — 99214 OFFICE O/P EST MOD 30 MIN: CPT | Mod: PBBFAC,25 | Performed by: ORTHOPAEDIC SURGERY

## 2020-09-23 PROCEDURE — 73562 X-RAY EXAM OF KNEE 3: CPT | Mod: 26,LT,, | Performed by: INTERNAL MEDICINE

## 2020-09-23 PROCEDURE — 99999 PR PBB SHADOW E&M-EST. PATIENT-LVL IV: CPT | Mod: PBBFAC,,, | Performed by: ORTHOPAEDIC SURGERY

## 2020-09-23 PROCEDURE — 97110 THERAPEUTIC EXERCISES: CPT | Mod: PO,CQ

## 2020-09-23 PROCEDURE — 99999 PR PBB SHADOW E&M-EST. PATIENT-LVL IV: ICD-10-PCS | Mod: PBBFAC,,, | Performed by: ORTHOPAEDIC SURGERY

## 2020-09-23 PROCEDURE — 73562 X-RAY EXAM OF KNEE 3: CPT | Mod: TC,LT

## 2020-09-23 RX ORDER — MELOXICAM 15 MG/1
15 TABLET ORAL DAILY
Qty: 30 TABLET | Refills: 1 | Status: SHIPPED | OUTPATIENT
Start: 2020-09-23 | End: 2020-10-16

## 2020-09-24 NOTE — PROGRESS NOTES
Physical Therapy Treatment Note     Name: Gerda Lai  Clinic Number: [id number]    Therapy Diagnosis:   Encounter Diagnoses   Name Primary?    Decreased range of motion (ROM) of left knee     Impaired functional mobility, balance, gait, and endurance     Acute pain of left knee     Decreased strength      Physician: Stacy Harris NP    Visit Date: 9/25/2020    Physician Orders: PT Eval and Treat S/p left knee replacement by Dr. Pacheco. She has completed home health and is ready to proceed with outpatient PT  Medical Diagnosis from Referral: Status post knee replacement, unspecified laterality  Evaluation Date: 9/2/2020  Authorization Period Expiration: 8/27/21  Plan of Care Expiration: 10/30/20  Visit # / Visits authorized: 7/20 (plus evaluation)    Time In: 10:30   Time Out: 11:15   Total Billable Time: 37 minutes  (2TE), ice x 8 min     Precautions: Standard and Fall    Subjective     Pt reports: she does not want to do therapy today this early, but she is here. She feels a little depressed lately with everything going on. The only thing that bothers her on the left side is her hip flexors/hip abductors.   She  Has been compliant with HEP  Response to previous treatment: no issues    Functional change: none stated    Pain: 4/10  Location: right knee      Objective       Gerda received therapeutic exercises to develop strength, endurance, ROM, flexibility and posture for 37 minutes including:      Upright bike, level 1, 5 min  LAQ 2 x 10/LE  Supine Hip adduction with glut squeeze, [id number]  B SAQ with 2#, 2 x 10 reps  SLR, 2x10 LLE  Hip ABD, hook-lying, green band, 20 reps  Standing hip abduction 2 x 10  Standing heel/toe raises 2 x 10   Standing marching 2 x 10       Ice to bilateral knees with compression x 8 min post therex.       Not performed today:  Pt received 10 minutes of cold pack to involved knee for edema control and pain relief   Gastroc and soleus stretch on incline,  x 1min each  Seated  hamstring curl, 2 x 10 reps, orange band  Standing hip Ext, LLE only, 2 x 10 reps  L heel slides with strap, 10 x 5s  Mini squats x 12   Roller to L quad  And ITBx 3 min  Pt attempts side-lying Hip ABD, but endorses difficulty  Hip flexor stretch off mat LLE 2 x 30 seconds   Hamstring stretch c/ strap, 3 x 30s/LE    Home Exercises Provided and Patient Education Provided     Education provided:   - HEP, exercise technique, ice 2-3x/day,    Written Home Exercises Provided: yes.  Exercises were reviewed and Gerda was able to demonstrate them prior to the end of the session.  Gerda demonstrated good  understanding of the education provided.     See EMR under Patient Instructions for exercises provided 9/10/20.    Assessment     Patient encouraged to continue to be mobile to improve function and mood. Cane was changed to left hand since patient right knee is giving her more trouble. Minor limitations with sessions due to right knee pain/instability. Patient performed exercises well, showing improved tolerance. Still challenged with closed chain therex.   Gerda is progressing well towards her goals.   Pt prognosis is Good.     Pt will continue to benefit from skilled outpatient physical therapy to address the deficits listed in the problem list box on initial evaluation, provide pt/family education and to maximize pt's level of independence in the home and community environment.     Pt's spiritual, cultural and educational needs considered and pt agreeable to plan of care and goals.     Anticipated barriers to physical therapy: advanced age    Goals:   Short Term Goals: 4 weeks   1. Pt will tolerate HEP for improved strength, functional mobility, ROM, posture, and endurance. (progressing, not met)  2. Pt will report reduced L knee pain to </= 1/10 for improved functional mobility and ability to participate in functional activities/ADL's. (progressing, not met)  3. Pt will increase L knee ext AROM to 0 degrees for  improved functional mobility and gait mechanics. (progressing, not met)  4. Pt will increase L knee flexion AROM to >/= 120 degrees for improved functional mobility and gait mechanics. (progressing, not met)  5. Pt will demo >/= 4+/5 strength in BLE's for improved functional mobility, endurance, and posture. (progressing, not met)  6. Pt will report improved ability to put on shoes and socks without increase in pain/symptoms for improved functional mobility (progressing, not met)  7. Pt will amb x >/=500 feet with Mod I and least restrictive device for improved function and community mobility (progressing, not met)     Long Term Goals: 8 weeks   1. Pt will be I with updated HEP for improved functional mobility, posture, strength, and endurance. (progressing, not met)     2. Pt will demo 5/5 strength in BLE's for improved functional mobility, endurance, and posture. (progressing, not met)  3. Pt will negotiate 1 flight of stairs with Mod I and without pain/symptoms for improved functional mobility (progressing, not met)  4.      Pt will amb x >/=300  feet with Mod I and no device for improved function and community mobility (progressing, not met)  5. Pt will reduced L knee circumference measurements by 1 cm each improved functional mobility and gait mechanics. (progressing, not met)        Plan   Plan of care Certification: 9/2/2020 to 10/30/20.     Outpatient Physical Therapy 2 times weekly for 8 weeks to include the following interventions: Gait Training, Manual Therapy, Moist Heat/ Ice, Neuromuscular Re-ed, Orthotic Management and Training, Patient Education, Self Care, Therapeutic Activites, Therapeutic Exercise and modalities as appropriate.     Incorporate standing exercises, especially Hip ABD as tolerated.      Rebecca Dubose, PTA

## 2020-09-24 NOTE — PROGRESS NOTES
Subjective:      Patient ID: Gerda Lai is a 87 y.o. female.    Chief Complaint:   Pain of the Left Knee    HPI  She comes in today for follow-up of her left TKA.  She is about 6 weeks out she does not require narcotic pain medications, just some Tylenol.  She is sleeping well at night.  She is getting around her house well with her cane.  She does still complain of some pain.      Objective:        Ortho/SPM Exam    On exam her wound is well healed without any redness or drainage.  Boggy synovitis without effusion.  Active range of motion 0-110 degrees.  Calves soft and NT.  DNVI.      IMAGING:  Exam Ordered: Radiographs of the left knee include a standing anteroposterior view, a lateral view in full flexion, sunrise  Details of Examination: Todays exam show a well fixed, well positioned total knee arthroplasty with no evidence of wear, osteolysis, or loosening.  Impression:  Status post left total knee arthroplasty, implant in good position with no abnormality    Assessment:   Progressing well      Plan:   3 month follow up.    The patient's pathophysiology was explained in detail with reference to x-rays, models, other visual aids as appropriate.  Treatment options were discussed in detail.  Questions were invited and answered to the patient's satisfaction.    Greater than 50% of this 15 minute visit was devoted to counseling and coordination of care      Kalin Pacheco MD  Orthopedic Surgery

## 2020-09-25 ENCOUNTER — CLINICAL SUPPORT (OUTPATIENT)
Dept: REHABILITATION | Facility: HOSPITAL | Age: 85
End: 2020-09-25
Attending: INTERNAL MEDICINE
Payer: MEDICARE

## 2020-09-25 DIAGNOSIS — M25.562 ACUTE PAIN OF LEFT KNEE: ICD-10-CM

## 2020-09-25 DIAGNOSIS — Z74.09 IMPAIRED FUNCTIONAL MOBILITY, BALANCE, GAIT, AND ENDURANCE: ICD-10-CM

## 2020-09-25 DIAGNOSIS — R53.1 DECREASED STRENGTH: ICD-10-CM

## 2020-09-25 DIAGNOSIS — M25.662 DECREASED RANGE OF MOTION (ROM) OF LEFT KNEE: ICD-10-CM

## 2020-09-25 PROCEDURE — 97110 THERAPEUTIC EXERCISES: CPT | Mod: PO,CQ

## 2020-09-28 ENCOUNTER — PATIENT OUTREACH (OUTPATIENT)
Dept: ADMINISTRATIVE | Facility: OTHER | Age: 85
End: 2020-09-28

## 2020-09-29 ENCOUNTER — OFFICE VISIT (OUTPATIENT)
Dept: CARDIOLOGY | Facility: CLINIC | Age: 85
End: 2020-09-29
Payer: MEDICARE

## 2020-09-29 ENCOUNTER — CLINICAL SUPPORT (OUTPATIENT)
Dept: REHABILITATION | Facility: HOSPITAL | Age: 85
End: 2020-09-29
Attending: INTERNAL MEDICINE
Payer: MEDICARE

## 2020-09-29 VITALS
BODY MASS INDEX: 24.35 KG/M2 | HEART RATE: 64 BPM | DIASTOLIC BLOOD PRESSURE: 64 MMHG | HEIGHT: 64 IN | SYSTOLIC BLOOD PRESSURE: 154 MMHG | WEIGHT: 142.63 LBS

## 2020-09-29 DIAGNOSIS — E78.5 HYPERLIPIDEMIA, UNSPECIFIED HYPERLIPIDEMIA TYPE: Chronic | ICD-10-CM

## 2020-09-29 DIAGNOSIS — I10 HYPERTENSION, ESSENTIAL: Primary | Chronic | ICD-10-CM

## 2020-09-29 DIAGNOSIS — I77.1 SUBCLAVIAN ARTERY STENOSIS, RIGHT: ICD-10-CM

## 2020-09-29 DIAGNOSIS — M25.562 ACUTE PAIN OF LEFT KNEE: ICD-10-CM

## 2020-09-29 DIAGNOSIS — I65.23 BILATERAL CAROTID ARTERY STENOSIS: ICD-10-CM

## 2020-09-29 DIAGNOSIS — N18.31 STAGE 3A CHRONIC KIDNEY DISEASE: ICD-10-CM

## 2020-09-29 DIAGNOSIS — R53.1 DECREASED STRENGTH: ICD-10-CM

## 2020-09-29 DIAGNOSIS — Z74.09 IMPAIRED FUNCTIONAL MOBILITY, BALANCE, GAIT, AND ENDURANCE: ICD-10-CM

## 2020-09-29 DIAGNOSIS — I70.0 AORTIC ATHEROSCLEROSIS: Chronic | ICD-10-CM

## 2020-09-29 DIAGNOSIS — Z85.3 PERSONAL HISTORY OF BREAST CANCER: ICD-10-CM

## 2020-09-29 DIAGNOSIS — M25.662 DECREASED RANGE OF MOTION (ROM) OF LEFT KNEE: ICD-10-CM

## 2020-09-29 PROCEDURE — 99999 PR PBB SHADOW E&M-EST. PATIENT-LVL IV: CPT | Mod: PBBFAC,,, | Performed by: NURSE PRACTITIONER

## 2020-09-29 PROCEDURE — 99214 OFFICE O/P EST MOD 30 MIN: CPT | Mod: S$PBB,,, | Performed by: NURSE PRACTITIONER

## 2020-09-29 PROCEDURE — 97110 THERAPEUTIC EXERCISES: CPT | Mod: PO

## 2020-09-29 PROCEDURE — 99999 PR PBB SHADOW E&M-EST. PATIENT-LVL IV: ICD-10-PCS | Mod: PBBFAC,,, | Performed by: NURSE PRACTITIONER

## 2020-09-29 PROCEDURE — 99214 OFFICE O/P EST MOD 30 MIN: CPT | Mod: PBBFAC,PO | Performed by: NURSE PRACTITIONER

## 2020-09-29 PROCEDURE — 99214 PR OFFICE/OUTPT VISIT, EST, LEVL IV, 30-39 MIN: ICD-10-PCS | Mod: S$PBB,,, | Performed by: NURSE PRACTITIONER

## 2020-09-29 NOTE — PROGRESS NOTES
Physical Therapy Treatment Note     Name: Gerda Lai  Clinic Number: [id number]    Therapy Diagnosis:   Encounter Diagnoses   Name Primary?    Decreased range of motion (ROM) of left knee     Impaired functional mobility, balance, gait, and endurance     Acute pain of left knee     Decreased strength      Physician: Stacy Harris NP    Visit Date: 9/29/2020    Physician Orders: PT Eval and Treat S/p left knee replacement by Dr. Pacheco. She has completed home health and is ready to proceed with outpatient PT  Medical Diagnosis from Referral: Status post knee replacement, unspecified laterality  Evaluation Date: 9/2/2020  Authorization Period Expiration: 8/27/21  Plan of Care Expiration: 10/30/20  Visit # / Visits authorized: 8/20 (plus evaluation)    Time In: 12:30 PM (late arrival)  Time Out: 1:00 PM  Total Billable Time: 25 minutes      Precautions: Standard and Fall    Subjective     Pt reports: she's frustrated that she's always running late and with everything going on. Pt states she did some exercises this morning  She  Has been compliant with HEP  Response to previous treatment: no issues    Functional change: none stated    Pain:3- 4/10  Location: left knee      Objective       Gerda received therapeutic exercises to develop strength, endurance, ROM, flexibility and posture for 25 minutes including:    L Knee AROM: -5 to 120     Upright bike, level 1, 5 min  Hip ABD, hook-lying, green band, 2 x 12 reps  Supine Hip adduction with glut squeeze, 2 x12 reps  Bridges, 2 x 12 reps  Hamstring stretch, 1 min/LE  B SAQ (short arc quad) with 2.5#, 2 x 10 reps    Not performed today:  LAQ 2 x 10/LE  SLR, 2x10 LLE  Standing hip abduction 2 x 10  Standing heel/toe raises 2 x 10   Standing marching 2 x 10   Gastroc and soleus stretch on incline,  x 1min each  Seated hamstring curl, 2 x 10 reps, orange band  Standing hip Ext, LLE only, 2 x 10 reps  L heel slides with strap, 10 x 5s  Mini squats x 12   Roller to  L quad  And ITBx 3 min  Hip flexor stretch off mat LLE 2 x 30 seconds     Home Exercises Provided and Patient Education Provided     Education provided:   - HEP, exercise technique, potential benefit of knee brace for R knee- pt provided with off the shelf knee sleeve to try at home.    Written Home Exercises Provided: yes.  Exercises were reviewed and Gerda was able to demonstrate them prior to the end of the session.  Gerda demonstrated good  understanding of the education provided.     See EMR under Patient Instructions for exercises provided 9/10/20.    Assessment     Patient tolerated session fair. Session limited due to late arrival. Pt provided with knee sleeve for added support to R knee- will follow up at next session, as pt was wearing jeans that prevented pt from trying brace in clinic. Otherwise, pt able to complete exercises without issue. She has met goals as noted below. Will progress as tolerated.   Gerda is progressing well towards her goals.   Pt prognosis is Good.     Pt will continue to benefit from skilled outpatient physical therapy to address the deficits listed in the problem list box on initial evaluation, provide pt/family education and to maximize pt's level of independence in the home and community environment.     Pt's spiritual, cultural and educational needs considered and pt agreeable to plan of care and goals.     Anticipated barriers to physical therapy: advanced age    Goals:   Short Term Goals: 4 weeks   1. Pt will tolerate HEP for improved strength, functional mobility, ROM, posture, and endurance. (Met, 9/29/20)  2. Pt will report reduced L knee pain to </= 1/10 for improved functional mobility and ability to participate in functional activities/ADL's. (progressing, not met)  3. Pt will increase L knee ext AROM to 0 degrees for improved functional mobility and gait mechanics. (progressing, not met)  4. Pt will increase L knee flexion AROM to >/= 120 degrees for improved  functional mobility and gait mechanics. (progressing, not met)  5. Pt will demo >/= 4+/5 strength in BLE's for improved functional mobility, endurance, and posture. (progressing, not met)  6. Pt will report improved ability to put on shoes and socks without increase in pain/symptoms for improved functional mobility (progressing, not met)  7. Pt will amb x >/=500 feet with Mod I and least restrictive device for improved function and community mobility (progressing, not met)     Long Term Goals: 8 weeks   1. Pt will be I with updated HEP for improved functional mobility, posture, strength, and endurance. (progressing, not met)     2. Pt will demo 5/5 strength in BLE's for improved functional mobility, endurance, and posture. (progressing, not met)  3. Pt will negotiate 1 flight of stairs with Mod I and without pain/symptoms for improved functional mobility (progressing, not met)  4.      Pt will amb x >/=300  feet with Mod I and no device for improved function and community mobility (progressing, not met)  5. Pt will reduced L knee circumference measurements by 1 cm each improved functional mobility and gait mechanics. (progressing, not met)        Plan   Plan of care Certification: 9/2/2020 to 10/30/20.     Outpatient Physical Therapy 2 times weekly for 8 weeks to include the following interventions: Gait Training, Manual Therapy, Moist Heat/ Ice, Neuromuscular Re-ed, Orthotic Management and Training, Patient Education, Self Care, Therapeutic Activites, Therapeutic Exercise and modalities as appropriate.     Incorporate standing exercises, especially Hip ABD as tolerated.      Cristina Meadows, PT

## 2020-09-29 NOTE — PROGRESS NOTES
Ms. Lai is a patient of Dr. Camarena and was last seen in [facility] 8/6/2020.      Subjective:   Patient ID:  Gerda Lai is a 87 y.o. female who presents for follow-up of Hypertension    Problem List:  Hypertension  - intolerance to multiple antihypertensive meds  - wide auscultatory gap  Right subclavian artery stenosis  Breast cancer    HPI  Ms. Gerda Lai is in clinic today for routine HTN follow up.  At her visit 6 months ago, Dr. Camarena increased labetalol at night to 150 mg and continued 100 mg in a.m.  She did not increase the labetolol as instructed by Dr. Camarena.   Her digital BP is running 150-170s systolic.  Reports eating a low salt diet.  Patient denies chest pain with exertion or at rest, palpitations, SOB, PLAZA, dizziness, syncope, edema, orthopnea, PND, or claudication.  Reports no routine exercise.    Review of Systems   Constitution: Negative for decreased appetite, diaphoresis, malaise/fatigue, weight gain and weight loss.   Eyes: Negative for visual disturbance.   Cardiovascular: Negative for chest pain, claudication, dyspnea on exertion, irregular heartbeat, leg swelling, near-syncope, orthopnea, palpitations, paroxysmal nocturnal dyspnea and syncope.        Denies chest pressure   Respiratory: Negative for cough, hemoptysis, shortness of breath, sleep disturbances due to breathing and snoring.    Endocrine: Negative for cold intolerance and heat intolerance.   Hematologic/Lymphatic: Negative for bleeding problem. Does not bruise/bleed easily.   Musculoskeletal: Positive for joint pain and joint swelling. Negative for myalgias.   Gastrointestinal: Negative for bloating, abdominal pain, anorexia, change in bowel habit, constipation, diarrhea, nausea and vomiting.   Neurological: Negative for difficulty with concentration, disturbances in coordination, excessive daytime sleepiness, dizziness, headaches, light-headedness, loss of balance, numbness and weakness.    Psychiatric/Behavioral: The patient does not have insomnia.        Allergies and current medications updated and reviewed:  Review of patient's allergies indicates:   Allergen Reactions    Sulfa (sulfonamide antibiotics) Rash    Clarithromycin Other (See Comments)     Weak, extreme fatigue. dizziness    Flexeril [cyclobenzaprine] Other (See Comments)     Dizziness    Iodine and iodide containing products     Lisinopril Other (See Comments)     Cough and sensation of throat swelling/?angioedema    Losartan Rash    Metoprolol Swelling     Tightness in throat    Tramadol Other (See Comments)     Dizzy and weak    Verapamil (bulk) Palpitations    Voltaren [diclofenac sodium] Other (See Comments)     Drops blood pressure     Current Outpatient Medications   Medication Sig    acetaminophen (TYLENOL) 650 MG TbSR Take 1 tablet (650 mg total) by mouth every 8 (eight) hours as needed.    albuterol (PROVENTIL/VENTOLIN HFA) 90 mcg/actuation inhaler Inhale 2 puffs into the lungs every 6 (six) hours as needed for Wheezing. Rescue    aliskiren (TEKTURNA) 300 MG Tab Take 1 tablet (300 mg total) by mouth once daily.    coenzyme Q10 (CO Q-10) 100 mg capsule Take 100 mg by mouth once daily.    doxazosin (CARDURA) 1 MG tablet Take 1 tablet (1 mg total) by mouth once daily.    felodipine (PLENDIL) 2.5 MG Tb24 TAKE ONE TABLET IN THE MORNING AND ONE TABLET IN THE EVENING (Patient taking differently: Take 2.5 mg by mouth once daily. TAKE ONE TABLET IN THE MORNING)    glucosamine-chondroitin 500-400 mg tablet Take 1 tablet by mouth once daily.     ipratropium (ATROVENT) 0.03 % nasal spray 2 sprays by Nasal route 2 (two) times daily as needed.     labetaloL (NORMODYNE) 100 MG tablet Take 1 tablet (100 mg total) by mouth every 12 (twelve) hours. TAKE 2 TABLETS (200 MG TOTAL) BY MOUTH EVERY 12 (TWELVE) HOURS. (Patient taking differently: Take 100 mg by mouth every 12 (twelve) hours. )    LORazepam (ATIVAN) 0.5 MG tablet  "TAKE 0.5-1 TABLETS (0.25-0.5 MG TOTAL) BY MOUTH EVERY 12 (TWELVE) HOURS AS NEEDED FOR ANXIETY.    meloxicam (MOBIC) 15 MG tablet Take 1 tablet (15 mg total) by mouth once daily.    multivitamin capsule Take 1 capsule by mouth once daily.    omeprazole (PRILOSEC OTC) 20 MG tablet Take 20 mg by mouth once daily.      polymyxin B sulf-trimethoprim (POLYTRIM) 10,000 unit- 1 mg/mL Drop Place 1 drop into both eyes every 6 (six) hours.    pravastatin (PRAVACHOL) 40 MG tablet Take 1 tablet (40 mg total) by mouth once daily.    trolamine salicylate (ASPERCREME TOP) Apply topically as needed.     TURMERIC ORAL Take 500 mg by mouth once daily.      No current facility-administered medications for this visit.        Objective:          BP (!) 154/64   Pulse 64   Ht 5' 4" (1.626 m)   Wt 64.7 kg (142 lb 10.2 oz)   BMI 24.48 kg/m²       Physical Exam   Constitutional: She is oriented to person, place, and time. Vital signs are normal. She appears well-developed and well-nourished. She is active. No distress.   HENT:   Head: Normocephalic and atraumatic.   Eyes: Conjunctivae and lids are normal. No scleral icterus.   Neck: Neck supple. Normal carotid pulses, no hepatojugular reflux and no JVD present. Carotid bruit is not present.   Cardiovascular: Normal rate, regular rhythm, S1 normal, S2 normal and intact distal pulses. PMI is not displaced. Exam reveals no gallop and no friction rub.   No murmur heard.  Pulses:       Carotid pulses are 2+ on the right side and 2+ on the left side.       Radial pulses are 2+ on the right side and 2+ on the left side.        Dorsalis pedis pulses are 2+ on the right side and 2+ on the left side.        Posterior tibial pulses are 1+ on the right side and 1+ on the left side.   Pulmonary/Chest: Effort normal and breath sounds normal. No respiratory distress. She has no decreased breath sounds. She has no wheezes. She has no rhonchi. She has no rales. She exhibits no tenderness. "   Abdominal: Soft. Normal appearance and bowel sounds are normal. She exhibits no distension, no fluid wave, no abdominal bruit, no ascites and no pulsatile midline mass. There is no hepatosplenomegaly. There is no abdominal tenderness.   Musculoskeletal:         General: No edema.   Neurological: She is alert and oriented to person, place, and time. Gait normal.   Skin: Skin is warm, dry and intact. No rash noted. She is not diaphoretic. Nails show no clubbing.   Psychiatric: She has a normal mood and affect. Her speech is normal and behavior is normal. Judgment and thought content normal. Cognition and memory are normal.   Nursing note and vitals reviewed.      Chemistry        Component Value Date/Time     09/22/2020 1027    K 4.0 09/22/2020 1027     09/22/2020 1027    CO2 25 09/22/2020 1027    BUN 31 (H) 09/22/2020 1027    CREATININE 1.0 09/22/2020 1027    GLU 88 09/22/2020 1027        Component Value Date/Time    CALCIUM 9.9 09/22/2020 1027    ALKPHOS 78 09/22/2020 1027    AST 17 09/22/2020 1027    ALT 13 09/22/2020 1027    BILITOT 0.7 09/22/2020 1027    ESTGFRAFRICA 58.5 (A) 09/22/2020 1027    EGFRNONAA 50.8 (A) 09/22/2020 1027        Lab Results   Component Value Date    HGBA1C 5.5 05/27/2011       Recent Labs   Lab 03/09/18  1510  11/06/18  1530  12/30/19  1620  08/05/20  1122 09/22/20  1027   WBC  --    < > 9.15   < > 12.37   < > 8.06  --    Hemoglobin  --    < > 12.5   < > 10.7 L   < > 11.3 L  --    Hematocrit  --    < > 37.6   < > 33.0 L   < > 34.7 L  --    Mean Corpuscular Volume  --    < > 100 H   < > 101 H   < > 101 H  --    Platelets  --    < > 229   < > 230   < > 223  --    BNP 34  --  47  --  141 H  --   --   --    TSH  --    < >  --    < >  --    < > 1.209  --    Cholesterol  --    < >  --    < >  --    < >  --  164   HDL  --    < >  --    < >  --    < >  --  54   LDL Cholesterol  --    < >  --    < >  --    < >  --  84.6   Triglycerides  --    < >  --    < >  --    < >  --  127    Hdl/Cholesterol Ratio  --    < >  --    < >  --    < >  --  32.9    < > = values in this interval not displayed.       Recent Labs   Lab 08/05/20  1122   INR 1.0        Test(s) Reviewed  I have reviewed the following in detail:  [] Stress test   [] Angiography   [x] Echocardiogram   [x] Labs   [] Other:         Assessment/Plan:   1. Hypertension, essential  BP not at goal <150/90.  Instructed to take the 150 mg labetolol at night as previously d/w Dr. Camarena.  HR would likely prohibit further increases to her labetolol and would recommend trying a low dose thiazide diuretic 2-3 days a week as she is very sensitive to medications with multiple medications that had intolerable side effects, it makes controlling her BP challenging.     2. Hyperlipidemia, unspecified hyperlipidemia type  LDL at goal <100. No changes      3. Aortic atherosclerosis      4. Subclavian artery stenosis, right      5. Bilateral carotid artery stenosis  Asymptomatic. No bruit on exam.  No changes.     6. CKD (chronic kidney disease), stage III      7. Personal history of breast cancer       Patient was discussed with but not examined by Dr. Camarena    No follow-ups on file.

## 2020-09-30 PROCEDURE — 99457 RPM TX MGMT 1ST 20 MIN: CPT | Mod: S$PBB,,, | Performed by: INTERNAL MEDICINE

## 2020-09-30 PROCEDURE — 99457 PR MONITORING, PHYSIOL PARAM, REMOTE, 1ST 20 MINS, PER MONTH: ICD-10-PCS | Mod: S$PBB,,, | Performed by: INTERNAL MEDICINE

## 2020-10-01 ENCOUNTER — PATIENT OUTREACH (OUTPATIENT)
Dept: OTHER | Facility: OTHER | Age: 85
End: 2020-10-01

## 2020-10-01 ENCOUNTER — CLINICAL SUPPORT (OUTPATIENT)
Dept: REHABILITATION | Facility: HOSPITAL | Age: 85
End: 2020-10-01
Attending: INTERNAL MEDICINE
Payer: MEDICARE

## 2020-10-01 DIAGNOSIS — M25.662 DECREASED RANGE OF MOTION (ROM) OF LEFT KNEE: ICD-10-CM

## 2020-10-01 DIAGNOSIS — R53.1 DECREASED STRENGTH: ICD-10-CM

## 2020-10-01 DIAGNOSIS — M25.562 ACUTE PAIN OF LEFT KNEE: ICD-10-CM

## 2020-10-01 DIAGNOSIS — Z74.09 IMPAIRED FUNCTIONAL MOBILITY, BALANCE, GAIT, AND ENDURANCE: ICD-10-CM

## 2020-10-01 PROCEDURE — 97110 THERAPEUTIC EXERCISES: CPT | Mod: PO

## 2020-10-01 PROCEDURE — 97140 MANUAL THERAPY 1/> REGIONS: CPT | Mod: PO

## 2020-10-01 NOTE — PROGRESS NOTES
Physical Therapy Treatment Note     Name: Gerda Lai  Clinic Number: [id number]    Therapy Diagnosis:   Encounter Diagnoses   Name Primary?    Decreased range of motion (ROM) of left knee     Impaired functional mobility, balance, gait, and endurance     Acute pain of left knee     Decreased strength      Physician: Stacy Harris NP    Visit Date: 10/1/2020    Physician Orders: PT Eval and Treat S/p left knee replacement by Dr. Pacheco. She has completed home health and is ready to proceed with outpatient PT  Medical Diagnosis from Referral: Status post knee replacement, unspecified laterality  Evaluation Date: 9/2/2020  Authorization Period Expiration: 8/27/21  Plan of Care Expiration: 10/30/20  Visit # / Visits authorized: 9/20 (plus evaluation)    Time In: 11:17 AM  Time Out: 12:10PM  Total Billable Time: 35 minutes      Precautions: Standard and Fall    Subjective     Pt reports: no new complaints. The brace did not work- it was too tight and she had to take it off of her R knee after 45 min. Pt endorses LLE pain at lateral thigh  She  Has been compliant with HEP  Response to previous treatment: no issues    Functional change: none stated    Pain:3- 4/10  Location: left knee      Objective       Gerda received therapeutic exercises to develop strength, endurance, ROM, flexibility and posture for 23minutes including:    Recumbent bike, level 1, 6 min  Gastroc and soleus stretch on incline, 1 min each  B SAQ (short arc quad) with 2.5#, 2 x 12 reps/LE  Bridge on bolster, 2 x 12 reps  L hamstring stretch c/ strap, 2 x 30s    Gerda received the following manual therapy techniques: Soft tissue Mobilization were applied to the: LLE for 12 minutes.   Gentle STM to L quad, ITB, gastroc, hamstring, posterior knee      Ice to L knee x 10 min at end of session      Not performed today:  LAQ 2 x 10/LE  SLR, 2x10 LLE  Standing hip abduction 2 x 10  Standing heel/toe raises 2 x 10   Standing marching 2 x 10    Gastroc and soleus stretch on incline,  x 1min each  Seated hamstring curl, 2 x 10 reps, orange band  Standing hip Ext, LLE only, 2 x 10 reps  L heel slides with strap, 10 x 5s  Mini squats x 12   Roller to L quad  And ITBx 3 min  Hip flexor stretch off mat LLE 2 x 30 seconds   Hip ABD, hook-lying, green band, 2 x 12 reps  Supine Hip adduction with glut squeeze, 2 x12 reps  Bridges, 2 x 12 reps  Home Exercises Provided and Patient Education Provided     Education provided:   - HEP, exercise technique. Encouraged RW use to reduce pain in B knees    Written Home Exercises Provided: yes.  Exercises were reviewed and Gerda was able to demonstrate them prior to the end of the session.  Gerda demonstrated good  understanding of the education provided.     See EMR under Patient Instructions for exercises provided 9/10/20.    Assessment     Patient tolerated session fairly well. Increased time spent on soft tissue work, which pt responds well too. Pt is especially tight at L medial hamstring and gastroc insertion.  PT emphasized importance of stretching at home, which pt states she is doing. Will progress as tolerated.   Gerda is progressing well towards her goals.   Pt prognosis is Good.     Pt will continue to benefit from skilled outpatient physical therapy to address the deficits listed in the problem list box on initial evaluation, provide pt/family education and to maximize pt's level of independence in the home and community environment.     Pt's spiritual, cultural and educational needs considered and pt agreeable to plan of care and goals.     Anticipated barriers to physical therapy: advanced age    Goals:   Short Term Goals: 4 weeks   1. Pt will tolerate HEP for improved strength, functional mobility, ROM, posture, and endurance. (Met, 9/29/20)  2. Pt will report reduced L knee pain to </= 1/10 for improved functional mobility and ability to participate in functional activities/ADL's. (progressing, not  met)  3. Pt will increase L knee ext AROM to 0 degrees for improved functional mobility and gait mechanics. (progressing, not met)  4. Pt will increase L knee flexion AROM to >/= 120 degrees for improved functional mobility and gait mechanics. (progressing, not met)  5. Pt will demo >/= 4+/5 strength in BLE's for improved functional mobility, endurance, and posture. (progressing, not met)  6. Pt will report improved ability to put on shoes and socks without increase in pain/symptoms for improved functional mobility (progressing, not met)  7. Pt will amb x >/=500 feet with Mod I and least restrictive device for improved function and community mobility (progressing, not met)     Long Term Goals: 8 weeks   1. Pt will be I with updated HEP for improved functional mobility, posture, strength, and endurance. (progressing, not met)     2. Pt will demo 5/5 strength in BLE's for improved functional mobility, endurance, and posture. (progressing, not met)  3. Pt will negotiate 1 flight of stairs with Mod I and without pain/symptoms for improved functional mobility (progressing, not met)  4.      Pt will amb x >/=300  feet with Mod I and no device for improved function and community mobility (progressing, not met)  5. Pt will reduced L knee circumference measurements by 1 cm each improved functional mobility and gait mechanics. (progressing, not met)        Plan   Plan of care Certification: 9/2/2020 to 10/30/20.     Outpatient Physical Therapy 2 times weekly for 8 weeks to include the following interventions: Gait Training, Manual Therapy, Moist Heat/ Ice, Neuromuscular Re-ed, Orthotic Management and Training, Patient Education, Self Care, Therapeutic Activites, Therapeutic Exercise and modalities as appropriate.     Incorporate standing exercises, especially Hip ABD as tolerated.      Cristina Meadows, PT

## 2020-10-06 ENCOUNTER — CLINICAL SUPPORT (OUTPATIENT)
Dept: REHABILITATION | Facility: HOSPITAL | Age: 85
End: 2020-10-06
Attending: INTERNAL MEDICINE
Payer: MEDICARE

## 2020-10-06 DIAGNOSIS — Z74.09 IMPAIRED FUNCTIONAL MOBILITY, BALANCE, GAIT, AND ENDURANCE: ICD-10-CM

## 2020-10-06 DIAGNOSIS — M25.662 DECREASED RANGE OF MOTION (ROM) OF LEFT KNEE: ICD-10-CM

## 2020-10-06 DIAGNOSIS — M25.562 ACUTE PAIN OF LEFT KNEE: ICD-10-CM

## 2020-10-06 DIAGNOSIS — R53.1 DECREASED STRENGTH: ICD-10-CM

## 2020-10-06 PROCEDURE — 97140 MANUAL THERAPY 1/> REGIONS: CPT | Mod: PO

## 2020-10-06 PROCEDURE — 97110 THERAPEUTIC EXERCISES: CPT | Mod: PO

## 2020-10-06 NOTE — PROGRESS NOTES
Physical Therapy Treatment Note     Name: Gerda Lai  Clinic Number: [id number]    Therapy Diagnosis:   Encounter Diagnoses   Name Primary?    Decreased range of motion (ROM) of left knee     Impaired functional mobility, balance, gait, and endurance     Acute pain of left knee     Decreased strength      Physician: Stacy Harris NP    Visit Date: 10/6/2020    Physician Orders: PT Eval and Treat S/p left knee replacement by Dr. Pacheco. She has completed home health and is ready to proceed with outpatient PT  Medical Diagnosis from Referral: Status post knee replacement, unspecified laterality  Evaluation Date: 9/2/2020  Authorization Period Expiration: 8/27/21  Plan of Care Expiration: 10/30/20  Visit # / Visits authorized: 10/20 (plus evaluation)    Time In: 12:23 PM  Time Out: 110PM  Total Billable Time: 40 minutes      Precautions: Standard and Fall    Subjective     Pt reports: no new complaints. She is walking more with the walker. She states she gets tight in the back of her left knee, like a knot  She  Has been compliant with HEP  Response to previous treatment: no issues    Functional change: none stated    Pain:3- /10  Location: left foot    Objective       Gerda received therapeutic exercises to develop strength, endurance, ROM, flexibility and posture for 25minutes including:    Recumbent bike, level 2, 6 min  Gastroc and soleus stretch on incline, 1 min each  Hamstring stretch on step, 2 x 30s/LE- has to stop due to pain  LLE step-ups on bottom step x ~10, but pt endorses pain  Clamshell, LLE, 3 x 10 reps  Bridge with band, 2 x 10 reps, green band        Gerda received the following manual therapy techniques: Soft tissue Mobilization were applied to the: LLE for 15 minutes.   Roller and STM to L quad, ITB, piriformis and hamstring  L knee distraction in sitting      Ice to L knee x 10 min at end of session      Not performed today:  LAQ 2 x 10/LE  SLR, 2x10 LLE  Standing hip abduction 2 x  10  Standing heel/toe raises 2 x 10   Standing marching 2 x 10   Gastroc and soleus stretch on incline,  x 1min each  Seated hamstring curl, 2 x 10 reps, orange band  Standing hip Ext, LLE only, 2 x 10 reps  L heel slides with strap, 10 x 5s  Mini squats x 12   Hip flexor stretch off mat LLE 2 x 30 seconds   B SAQ (short arc quad) with 2.5#, 2 x 12 reps/LE  Bridge on bolster, 2 x 12 reps  L hamstring stretch c/ strap, 2 x 30s  Home Exercises Provided and Patient Education Provided     Education provided:   - HEP, exercise technique. Encouraged RW use to reduce pain in B knees    Written Home Exercises Provided: yes.  Exercises were reviewed and Gerda was able to demonstrate them prior to the end of the session.  Gerda demonstrated good  understanding of the education provided.     See EMR under Patient Instructions for exercises provided 9/10/20.    Assessment     Patient tolerated session fairly well. Increased time spent on soft tissue work, which pt responds well to. She states she feels better at end of session.   PT emphasized importance of stretching at home 2x/day- pt verbalizes understanding.  Will progress as tolerated.   Gerda is progressing well towards her goals.   Pt prognosis is Good.     Pt will continue to benefit from skilled outpatient physical therapy to address the deficits listed in the problem list box on initial evaluation, provide pt/family education and to maximize pt's level of independence in the home and community environment.     Pt's spiritual, cultural and educational needs considered and pt agreeable to plan of care and goals.     Anticipated barriers to physical therapy: advanced age    Goals:   Short Term Goals: 4 weeks   1. Pt will tolerate HEP for improved strength, functional mobility, ROM, posture, and endurance. (Met, 9/29/20)  2. Pt will report reduced L knee pain to </= 1/10 for improved functional mobility and ability to participate in functional activities/ADL's.  (progressing, not met)  3. Pt will increase L knee ext AROM to 0 degrees for improved functional mobility and gait mechanics. (progressing, not met)  4. Pt will increase L knee flexion AROM to >/= 120 degrees for improved functional mobility and gait mechanics. (progressing, not met)  5. Pt will demo >/= 4+/5 strength in BLE's for improved functional mobility, endurance, and posture. (progressing, not met)  6. Pt will report improved ability to put on shoes and socks without increase in pain/symptoms for improved functional mobility (progressing, not met)  7. Pt will amb x >/=500 feet with Mod I and least restrictive device for improved function and community mobility (progressing, not met)     Long Term Goals: 8 weeks   1. Pt will be I with updated HEP for improved functional mobility, posture, strength, and endurance. (progressing, not met)     2. Pt will demo 5/5 strength in BLE's for improved functional mobility, endurance, and posture. (progressing, not met)  3. Pt will negotiate 1 flight of stairs with Mod I and without pain/symptoms for improved functional mobility (progressing, not met)  4.      Pt will amb x >/=300  feet with Mod I and no device for improved function and community mobility (progressing, not met)  5. Pt will reduced L knee circumference measurements by 1 cm each improved functional mobility and gait mechanics. (progressing, not met)        Plan   Plan of care Certification: 9/2/2020 to 10/30/20.     Outpatient Physical Therapy 2 times weekly for 8 weeks to include the following interventions: Gait Training, Manual Therapy, Moist Heat/ Ice, Neuromuscular Re-ed, Orthotic Management and Training, Patient Education, Self Care, Therapeutic Activites, Therapeutic Exercise and modalities as appropriate.     Incorporate standing exercises, especially Hip ABD as tolerated.      Cristina Meadows, PT

## 2020-10-09 ENCOUNTER — CLINICAL SUPPORT (OUTPATIENT)
Dept: REHABILITATION | Facility: HOSPITAL | Age: 85
End: 2020-10-09
Attending: NURSE PRACTITIONER
Payer: MEDICARE

## 2020-10-09 DIAGNOSIS — R53.1 DECREASED STRENGTH: ICD-10-CM

## 2020-10-09 DIAGNOSIS — M25.662 DECREASED RANGE OF MOTION (ROM) OF LEFT KNEE: ICD-10-CM

## 2020-10-09 DIAGNOSIS — M25.562 ACUTE PAIN OF LEFT KNEE: ICD-10-CM

## 2020-10-09 DIAGNOSIS — Z74.09 IMPAIRED FUNCTIONAL MOBILITY, BALANCE, GAIT, AND ENDURANCE: ICD-10-CM

## 2020-10-09 PROCEDURE — 97140 MANUAL THERAPY 1/> REGIONS: CPT | Mod: PO

## 2020-10-09 PROCEDURE — 97110 THERAPEUTIC EXERCISES: CPT | Mod: PO

## 2020-10-09 NOTE — PROGRESS NOTES
Physical Therapy Treatment Note     Name: Gerda Lai  Clinic Number: [id number]    Therapy Diagnosis:   Encounter Diagnoses   Name Primary?    Decreased range of motion (ROM) of left knee     Impaired functional mobility, balance, gait, and endurance     Acute pain of left knee     Decreased strength      Physician: Stacy Harris NP    Visit Date: 10/9/2020    Physician Orders: PT Eval and Treat S/p left knee replacement by Dr. Pacheco. She has completed home health and is ready to proceed with outpatient PT  Medical Diagnosis from Referral: Status post knee replacement, unspecified laterality  Evaluation Date: 9/2/2020  Authorization Period Expiration: 8/27/21  Plan of Care Expiration: 10/30/20  Visit # / Visits authorized: 11/20 (plus evaluation)    Time In: 11:06 AM (late arrival)  Time Out: 11:55 AM  Total Billable Time: 50minutes      Precautions: Standard and Fall    Subjective     Pt reports: no new complaints. Still walking with RW mostly due to R knee pain  She  Has been compliant with HEP  Response to previous treatment: felt better   Functional change: none stated    Pain:3-4/10  Location: left  knee    Objective       Gerda received therapeutic exercises to develop strength, endurance, ROM, flexibility and posture for 25minutes including:    Range of Motion:   Knee Left active Right Active   Flexion 125 118   Extension -2 0               Lower Extremity Strength (Seated)  Right LE   Left LE     Knee extension: 5/5 Knee extension: 5/5   Knee flexion: 5/5 Knee flexion: 5/5   Hip flexion: 5/5 Hip flexion: 5/5   Hip extension:  >/=4/5 Hip extension: >/=4/5   Hip abduction: 4+/5 Hip abduction: 4+/5   Hip adduction: 5/5 Hip adduction 5/5   Ankle dorsiflexion: 5/5 Ankle dorsiflexion: 5/5   Ankle plantarflexion: 5/5 Ankle plantarflexion: 5/5          Recumbent bike, level 2, 6 min  Gastroc stretch with sheet, 3 x 30s, LLE  soleus stretch with sheet, 2 x 30s, LLE  L Heel slides, 10 x 10s  Hamstring  stretch c/ strap, 2 x 30s/LE  Standing hip Extension,  x 10 reps/LE  Standing hip ABD, 10/LE        Gerda received the following manual therapy techniques: Soft tissue Mobilization were applied to the: LLE for 10 minutes.   Roller and STM to L quad, ITB,  and hamstring  B knee distraction in sitting        Not performed today:  Clamshell, LLE, 3 x 10 reps  Bridge with band, 2 x 10 reps, green band  LAQ 2 x 10/LE  SLR, 2x10 LLE  Standing heel/toe raises 2 x 10   Standing marching 2 x 10   Gastroc and soleus stretch on incline,  x 1min each  Seated hamstring curl, 2 x 10 reps, orange band  Mini squats x 12   Hip flexor stretch off mat LLE 2 x 30 seconds   B SAQ (short arc quad) with 2.5#, 2 x 12 reps/LE  Bridge on bolster, 2 x 12 reps    Home Exercises Provided and Patient Education Provided     Education provided:   - HEP, exercise technique. Progress to date    Written Home Exercises Provided: yes.  Exercises were reviewed and Gerda was able to demonstrate them prior to the end of the session.  Gerda demonstrated good  understanding of the education provided.     See EMR under Patient Instructions for exercises provided 9/10/20 and 10/9/20.    Assessment     Patient tolerated session fairly well. She demo's improved knee AROM and LE strength. She responds well to manual techniques. She has met goals as noted below. She will benefit from continued PT to address remaining goals in strength, gait, endurance, ROM, and functional mobility.  Will progress as tolerated.   Gerda is progressing well towards her goals.   Pt prognosis is Good.     Pt will continue to benefit from skilled outpatient physical therapy to address the deficits listed in the problem list box on initial evaluation, provide pt/family education and to maximize pt's level of independence in the home and community environment.     Pt's spiritual, cultural and educational needs considered and pt agreeable to plan of care and goals.     Anticipated  barriers to physical therapy: advanced age    Goals:   Short Term Goals: 4 weeks   1. Pt will tolerate HEP for improved strength, functional mobility, ROM, posture, and endurance. (Met, 9/29/20)  2. Pt will report reduced L knee pain to </= 1/10 for improved functional mobility and ability to participate in functional activities/ADL's. (progressing, not met)  3. Pt will increase L knee ext AROM to 0 degrees for improved functional mobility and gait mechanics. (progressing, not met)  4. Pt will increase L knee flexion AROM to >/= 120 degrees for improved functional mobility and gait mechanics. (Met, 10/9  5. Pt will demo >/= 4+/5 strength in BLE's for improved functional mobility, endurance, and posture. (progressing, not met)  6. Pt will report improved ability to put on shoes and socks without increase in pain/symptoms for improved functional mobility (Met, 10/9)  7. Pt will amb x >/=500 feet with Mod I and least restrictive device for improved function and community mobility (progressing, not met)     Long Term Goals: 8 weeks   1. Pt will be I with updated HEP for improved functional mobility, posture, strength, and endurance. (progressing, not met)     2. Pt will demo 5/5 strength in BLE's for improved functional mobility, endurance, and posture. (progressing, not met)  3. Pt will negotiate 1 flight of stairs with Mod I and without pain/symptoms for improved functional mobility (progressing, not met)  4.      Pt will amb x >/=300  feet with Mod I and no device for improved function and community mobility (progressing, not met)  5. Pt will reduced L knee circumference measurements by 1 cm each improved functional mobility and gait mechanics. (progressing, not met)        Plan   Plan of care Certification: 9/2/2020 to 10/30/20.     Outpatient Physical Therapy 2 times weekly for 8 weeks to include the following interventions: Gait Training, Manual Therapy, Moist Heat/ Ice, Neuromuscular Re-ed, Orthotic Management  and Training, Patient Education, Self Care, Therapeutic Activites, Therapeutic Exercise and modalities as appropriate.     Incorporate standing exercises, especially Hip ABD as tolerated.      Cristina Meadows, PT     Physical Therapy Treatment Note     Name: Gerda Lai  Clinic Number: [id number]    Therapy Diagnosis:   Encounter Diagnoses   Name Primary?    Decreased range of motion (ROM) of left knee     Impaired functional mobility, balance, gait, and endurance     Acute pain of left knee     Decreased strength      Physician: Stacy Harris NP    Visit Date: 10/9/2020    Physician Orders: PT Eval and Treat S/p left knee replacement by Dr. Pacheco. She has completed home health and is ready to proceed with outpatient PT  Medical Diagnosis from Referral: Status post knee replacement, unspecified laterality  Evaluation Date: 9/2/2020  Authorization Period Expiration: 8/27/21  Plan of Care Expiration: 10/30/20  Visit # / Visits authorized: 10/20 (plus evaluation)    Time In: 12:23 PM  Time Out: 110PM  Total Billable Time: 40 minutes      Precautions: Standard and Fall    Subjective     Pt reports: no new complaints. She is walking more with the walker. She states she gets tight in the back of her left knee, like a knot  She  Has been compliant with HEP  Response to previous treatment: no issues    Functional change: none stated    Pain:3- /10  Location: left foot    Objective       Gerda received therapeutic exercises to develop strength, endurance, ROM, flexibility and posture for 25minutes including:    Recumbent bike, level 2, 6 min  Gastroc and soleus stretch on incline, 1 min each  Hamstring stretch on step, 2 x 30s/LE- has to stop due to pain  LLE step-ups on bottom step x ~10, but pt endorses pain  Clamshell, LLE, 3 x 10 reps  Bridge with band, 2 x 10 reps, green band        Gerda received the following manual therapy techniques: Soft tissue Mobilization were applied to the: LLE for 15 minutes.    Roller and STM to L quad, ITB, piriformis and hamstring  L knee distraction in sitting      Ice to L knee x 10 min at end of session      Not performed today:  LAQ 2 x 10/LE  SLR, 2x10 LLE  Standing hip abduction 2 x 10  Standing heel/toe raises 2 x 10   Standing marching 2 x 10   Gastroc and soleus stretch on incline,  x 1min each  Seated hamstring curl, 2 x 10 reps, orange band  Standing hip Ext, LLE only, 2 x 10 reps  L heel slides with strap, 10 x 5s  Mini squats x 12   Hip flexor stretch off mat LLE 2 x 30 seconds   B SAQ (short arc quad) with 2.5#, 2 x 12 reps/LE  Bridge on bolster, 2 x 12 reps  L hamstring stretch c/ strap, 2 x 30s  Home Exercises Provided and Patient Education Provided     Education provided:   - HEP, exercise technique. Encouraged RW use to reduce pain in B knees    Written Home Exercises Provided: yes.  Exercises were reviewed and Gerda was able to demonstrate them prior to the end of the session.  Gerda demonstrated good  understanding of the education provided.     See EMR under Patient Instructions for exercises provided 9/10/20.    Assessment     Patient tolerated session fairly well. Increased time spent on soft tissue work, which pt responds well to. She states she feels better at end of session.   PT emphasized importance of stretching at home 2x/day- pt verbalizes understanding.  Will progress as tolerated.   Gerda is progressing well towards her goals.   Pt prognosis is Good.     Pt will continue to benefit from skilled outpatient physical therapy to address the deficits listed in the problem list box on initial evaluation, provide pt/family education and to maximize pt's level of independence in the home and community environment.     Pt's spiritual, cultural and educational needs considered and pt agreeable to plan of care and goals.     Anticipated barriers to physical therapy: advanced age    Goals:   Short Term Goals: 4 weeks   8. Pt will tolerate HEP for improved  strength, functional mobility, ROM, posture, and endurance. (Met, 9/29/20)  9. Pt will report reduced L knee pain to </= 1/10 for improved functional mobility and ability to participate in functional activities/ADL's. (progressing, not met)  10. Pt will increase L knee ext AROM to 0 degrees for improved functional mobility and gait mechanics. (progressing, not met)  11. Pt will increase L knee flexion AROM to >/= 120 degrees for improved functional mobility and gait mechanics. (progressing, not met)  12. Pt will demo >/= 4+/5 strength in BLE's for improved functional mobility, endurance, and posture. (progressing, not met)  13. Pt will report improved ability to put on shoes and socks without increase in pain/symptoms for improved functional mobility (progressing, not met)  14. Pt will amb x >/=500 feet with Mod I and least restrictive device for improved function and community mobility (progressing, not met)     Long Term Goals: 8 weeks   2. Pt will be I with updated HEP for improved functional mobility, posture, strength, and endurance. (progressing, not met)     4. Pt will demo 5/5 strength in BLE's for improved functional mobility, endurance, and posture. (progressing, not met)  5. Pt will negotiate 1 flight of stairs with Mod I and without pain/symptoms for improved functional mobility (progressing, not met)  4.      Pt will amb x >/=300  feet with Mod I and no device for improved function and community mobility (progressing, not met)  5. Pt will reduced L knee circumference measurements by 1 cm each improved functional mobility and gait mechanics. (progressing, not met)        Plan   Plan of care Certification: 9/2/2020 to 10/30/20.     Outpatient Physical Therapy 2 times weekly for 8 weeks to include the following interventions: Gait Training, Manual Therapy, Moist Heat/ Ice, Neuromuscular Re-ed, Orthotic Management and Training, Patient Education, Self Care, Therapeutic Activites, Therapeutic Exercise and  modalities as appropriate.     Incorporate standing exercises, especially Hip ABD as tolerated.      Cristina Meadows, PT

## 2020-10-14 ENCOUNTER — CLINICAL SUPPORT (OUTPATIENT)
Dept: REHABILITATION | Facility: HOSPITAL | Age: 85
End: 2020-10-14
Attending: INTERNAL MEDICINE
Payer: MEDICARE

## 2020-10-14 DIAGNOSIS — R53.1 DECREASED STRENGTH: ICD-10-CM

## 2020-10-14 DIAGNOSIS — M25.662 DECREASED RANGE OF MOTION (ROM) OF LEFT KNEE: ICD-10-CM

## 2020-10-14 DIAGNOSIS — M25.562 ACUTE PAIN OF LEFT KNEE: ICD-10-CM

## 2020-10-14 DIAGNOSIS — Z74.09 IMPAIRED FUNCTIONAL MOBILITY, BALANCE, GAIT, AND ENDURANCE: ICD-10-CM

## 2020-10-14 PROCEDURE — 97110 THERAPEUTIC EXERCISES: CPT | Mod: PO

## 2020-10-14 NOTE — PROGRESS NOTES
Physical Therapy Treatment Note     Name: Gerda Lai  Clinic Number: [id number]    Therapy Diagnosis:   No diagnosis found.  Physician: Stacy Harris NP    Visit Date: 10/14/2020    Physician Orders: PT Eval and Treat S/p left knee replacement by Dr. Pacheco. She has completed home health and is ready to proceed with outpatient PT  Medical Diagnosis from Referral: Status post knee replacement, unspecified laterality  Evaluation Date: 9/2/2020  Authorization Period Expiration: 8/27/21  Plan of Care Expiration: 10/30/20  Visit # / Visits authorized: 12/20 (plus evaluation)    Time In: 11:09 AM (late arrival)  Time Out: 11:52 AM  Total Billable Time: 36 minutes      Precautions: Standard and Fall    Subjective     Pt reports: Struggles in the morning, but she's doing OK. Feels better since doing exercises on both LE's  She  Has been compliant with HEP  Response to previous treatment: felt better   Functional change: none stated    Pain:1-2/10  Location: bilateral  knees    Objective   Pt arrives amb with SPC today.    Gerda received therapeutic exercises to develop strength, endurance, ROM, flexibility and posture for 30minutes including:      Recumbent bike, level 1,  6 min  Standing hip Extension, 2 x 10 reps/LE, yellow band above knees  Standing hip ABD, 2 x 10/LE, yellow band above knees  L hamstring curl, 2 x 10 reps, orange band  Bridge with green band, 2 x 10 reps  Hamstring stretch c/ strap, 1 min/LE        Gerda received the following manual therapy techniques: Soft tissue Mobilization were applied to the: LLE for 6minutes.   Roller and STM to L quad, ITB, piriformis and hamstring  L knee distraction in sitting        Not performed today:  Clamshell, LLE, 3 x 10 reps  Gastroc stretch with sheet, 3 x 30s, LLE  soleus stretch with sheet, 2 x 30s, LLE  L Heel slides, 10 x 10s  LAQ 2 x 10/LE  SLR, 2x10 LLE  Standing heel/toe raises 2 x 10   Standing marching 2 x 10   Gastroc and soleus stretch on incline,   x 1min each  Mini squats x 12   Hip flexor stretch off mat LLE 2 x 30 seconds   B SAQ (short arc quad) with 2.5#, 2 x 12 reps/LE  Bridge on bolster, 2 x 12 reps    Home Exercises Provided and Patient Education Provided     Education provided:   - HEP, exercise technique.     Written Home Exercises Provided: Patient instructed to cont prior HEP.  Exercises were reviewed and Gerda was able to demonstrate them prior to the end of the session.  Gedra demonstrated good  understanding of the education provided.     See EMR under Patient Instructions for exercises provided 9/10/20 and 10/9/20.    Assessment     Patient tolerated session well- she was able to tolerate progression of standing exercises. She responds well to manual techniques. She was able to tolerate walking with cane today and reports less pain. She will benefit from continued PT to address remaining goals in strength, gait, endurance, ROM, and functional mobility.  Will progress as tolerated.   Gerda is progressing well towards her goals.   Pt prognosis is Good.     Pt will continue to benefit from skilled outpatient physical therapy to address the deficits listed in the problem list box on initial evaluation, provide pt/family education and to maximize pt's level of independence in the home and community environment.     Pt's spiritual, cultural and educational needs considered and pt agreeable to plan of care and goals.     Anticipated barriers to physical therapy: advanced age    Goals:   Short Term Goals: 4 weeks   1. Pt will tolerate HEP for improved strength, functional mobility, ROM, posture, and endurance. (Met, 9/29/20)  2. Pt will report reduced L knee pain to </= 1/10 for improved functional mobility and ability to participate in functional activities/ADL's. (progressing, not met)  3. Pt will increase L knee ext AROM to 0 degrees for improved functional mobility and gait mechanics. (progressing, not met)  4. Pt will increase L knee flexion  AROM to >/= 120 degrees for improved functional mobility and gait mechanics. (Met, 10/9  5. Pt will demo >/= 4+/5 strength in BLE's for improved functional mobility, endurance, and posture. (progressing, not met)  6. Pt will report improved ability to put on shoes and socks without increase in pain/symptoms for improved functional mobility (Met, 10/9)  7. Pt will amb x >/=500 feet with Mod I and least restrictive device for improved function and community mobility (progressing, not met)     Long Term Goals: 8 weeks   1. Pt will be I with updated HEP for improved functional mobility, posture, strength, and endurance. (progressing, not met)     2. Pt will demo 5/5 strength in BLE's for improved functional mobility, endurance, and posture. (progressing, not met)  3. Pt will negotiate 1 flight of stairs with Mod I and without pain/symptoms for improved functional mobility (progressing, not met)  4.      Pt will amb x >/=300  feet with Mod I and no device for improved function and community mobility (progressing, not met)  5. Pt will reduced L knee circumference measurements by 1 cm each improved functional mobility and gait mechanics. (progressing, not met)        Plan   Plan of care Certification: 9/2/2020 to 10/30/20.     Outpatient Physical Therapy 2 times weekly for 8 weeks to include the following interventions: Gait Training, Manual Therapy, Moist Heat/ Ice, Neuromuscular Re-ed, Orthotic Management and Training, Patient Education, Self Care, Therapeutic Activites, Therapeutic Exercise and modalities as appropriate.     Incorporate standing exercises, especially Hip ABD as tolerated.      Cristina Meadows, PT     Physical Therapy Treatment Note     Name: Gerda Lai  Clinic Number: [id number]    Therapy Diagnosis:   No diagnosis found.  Physician: Stacy Harris NP    Visit Date: 10/14/2020    Physician Orders: PT Eval and Treat S/p left knee replacement by Dr. Pacheco. She has completed home health and is ready to  proceed with outpatient PT  Medical Diagnosis from Referral: Status post knee replacement, unspecified laterality  Evaluation Date: 9/2/2020  Authorization Period Expiration: 8/27/21  Plan of Care Expiration: 10/30/20  Visit # / Visits authorized: 10/20 (plus evaluation)    Time In: 12:23 PM  Time Out: 110PM  Total Billable Time: 40 minutes      Precautions: Standard and Fall    Subjective     Pt reports: no new complaints. She is walking more with the walker. She states she gets tight in the back of her left knee, like a knot  She  Has been compliant with HEP  Response to previous treatment: no issues    Functional change: none stated    Pain:3- /10  Location: left foot    Objective       Gerda received therapeutic exercises to develop strength, endurance, ROM, flexibility and posture for 25minutes including:    Recumbent bike, level 2, 6 min  Gastroc and soleus stretch on incline, 1 min each  Hamstring stretch on step, 2 x 30s/LE- has to stop due to pain  LLE step-ups on bottom step x ~10, but pt endorses pain  Clamshell, LLE, 3 x 10 reps  Bridge with band, 2 x 10 reps, green band        Gerda received the following manual therapy techniques: Soft tissue Mobilization were applied to the: LLE for 15 minutes.   Roller and STM to L quad, ITB, piriformis and hamstring  L knee distraction in sitting      Ice to L knee x 10 min at end of session      Not performed today:  LAQ 2 x 10/LE  SLR, 2x10 LLE  Standing hip abduction 2 x 10  Standing heel/toe raises 2 x 10   Standing marching 2 x 10   Gastroc and soleus stretch on incline,  x 1min each  Seated hamstring curl, 2 x 10 reps, orange band  Standing hip Ext, LLE only, 2 x 10 reps  L heel slides with strap, 10 x 5s  Mini squats x 12   Hip flexor stretch off mat LLE 2 x 30 seconds   B SAQ (short arc quad) with 2.5#, 2 x 12 reps/LE  Bridge on bolster, 2 x 12 reps  L hamstring stretch c/ strap, 2 x 30s  Home Exercises Provided and Patient Education Provided     Education  provided:   - HEP, exercise technique. Encouraged RW use to reduce pain in B knees    Written Home Exercises Provided: yes.  Exercises were reviewed and Gerda was able to demonstrate them prior to the end of the session.  Gerda demonstrated good  understanding of the education provided.     See EMR under Patient Instructions for exercises provided 9/10/20.    Assessment     Patient tolerated session fairly well. Increased time spent on soft tissue work, which pt responds well to. She states she feels better at end of session.   PT emphasized importance of stretching at home 2x/day- pt verbalizes understanding.  Will progress as tolerated.   Gerda is progressing well towards her goals.   Pt prognosis is Good.     Pt will continue to benefit from skilled outpatient physical therapy to address the deficits listed in the problem list box on initial evaluation, provide pt/family education and to maximize pt's level of independence in the home and community environment.     Pt's spiritual, cultural and educational needs considered and pt agreeable to plan of care and goals.     Anticipated barriers to physical therapy: advanced age    Goals:   Short Term Goals: 4 weeks   8. Pt will tolerate HEP for improved strength, functional mobility, ROM, posture, and endurance. (Met, 9/29/20)  9. Pt will report reduced L knee pain to </= 1/10 for improved functional mobility and ability to participate in functional activities/ADL's. (progressing, not met)  10. Pt will increase L knee ext AROM to 0 degrees for improved functional mobility and gait mechanics. (progressing, not met)  11. Pt will increase L knee flexion AROM to >/= 120 degrees for improved functional mobility and gait mechanics. (progressing, not met)  12. Pt will demo >/= 4+/5 strength in BLE's for improved functional mobility, endurance, and posture. (progressing, not met)  13. Pt will report improved ability to put on shoes and socks without increase in  pain/symptoms for improved functional mobility (progressing, not met)  14. Pt will amb x >/=500 feet with Mod I and least restrictive device for improved function and community mobility (progressing, not met)     Long Term Goals: 8 weeks   2. Pt will be I with updated HEP for improved functional mobility, posture, strength, and endurance. (progressing, not met)     4. Pt will demo 5/5 strength in BLE's for improved functional mobility, endurance, and posture. (progressing, not met)  5. Pt will negotiate 1 flight of stairs with Mod I and without pain/symptoms for improved functional mobility (progressing, not met)  4.      Pt will amb x >/=300  feet with Mod I and no device for improved function and community mobility (progressing, not met)  5. Pt will reduced L knee circumference measurements by 1 cm each improved functional mobility and gait mechanics. (progressing, not met)        Plan   Plan of care Certification: 9/2/2020 to 10/30/20.     Outpatient Physical Therapy 2 times weekly for 8 weeks to include the following interventions: Gait Training, Manual Therapy, Moist Heat/ Ice, Neuromuscular Re-ed, Orthotic Management and Training, Patient Education, Self Care, Therapeutic Activites, Therapeutic Exercise and modalities as appropriate.     Incorporate standing exercises, especially Hip ABD as tolerated.      Cristina Meadows, PT

## 2020-10-16 ENCOUNTER — CLINICAL SUPPORT (OUTPATIENT)
Dept: REHABILITATION | Facility: HOSPITAL | Age: 85
End: 2020-10-16
Attending: INTERNAL MEDICINE
Payer: MEDICARE

## 2020-10-16 ENCOUNTER — PATIENT OUTREACH (OUTPATIENT)
Dept: OTHER | Facility: OTHER | Age: 85
End: 2020-10-16

## 2020-10-16 DIAGNOSIS — M25.662 DECREASED RANGE OF MOTION (ROM) OF LEFT KNEE: ICD-10-CM

## 2020-10-16 DIAGNOSIS — I10 HYPERTENSION, ESSENTIAL: Chronic | ICD-10-CM

## 2020-10-16 DIAGNOSIS — I10 ESSENTIAL HYPERTENSION: ICD-10-CM

## 2020-10-16 DIAGNOSIS — M25.562 ACUTE PAIN OF LEFT KNEE: ICD-10-CM

## 2020-10-16 DIAGNOSIS — R53.1 DECREASED STRENGTH: ICD-10-CM

## 2020-10-16 DIAGNOSIS — Z74.09 IMPAIRED FUNCTIONAL MOBILITY, BALANCE, GAIT, AND ENDURANCE: ICD-10-CM

## 2020-10-16 PROCEDURE — 97110 THERAPEUTIC EXERCISES: CPT | Mod: PO

## 2020-10-16 RX ORDER — FELODIPINE 2.5 MG/1
2.5 TABLET, EXTENDED RELEASE ORAL EVERY MORNING
Qty: 30 TABLET | Refills: 5 | Status: SHIPPED | OUTPATIENT
Start: 2020-10-16 | End: 2021-08-16

## 2020-10-16 RX ORDER — LABETALOL 100 MG/1
100 TABLET, FILM COATED ORAL EVERY 12 HOURS
Qty: 60 TABLET | Refills: 5 | Status: SHIPPED | OUTPATIENT
Start: 2020-10-16 | End: 2021-05-03 | Stop reason: DRUGHIGH

## 2020-10-16 NOTE — PROGRESS NOTES
Digital Medicine: Clinician Follow-Up    Gerda Lai returned phone call for hypertension Digital Medicine follow-up.     Patient has resumed measuring now that she is home from rehab. Patient reports walking better. She is still recouping from knee surgery. She is attending rehab two times a week for her recovery. She is now using a Life Alert at home in the event she has fall. She reports charging BP device before measuring reading. She reports having a headache on yesterday and feels it is attributed to the isolation of being home alone with COVID-19.    Patient reports no longer taking meloxicam and requests it be removed.     The history is provided by the patient.   Follow-up reason(s): routine follow up.     Hypertension  Patient needs assistance troubleshooting: Patient was in rehab where her BP was being monitored and managed by the staff.  Patient is not experiencing signs/symptoms of hypertension. Headache has resolved.             Last 5 Patient Entered Readings                                      Current 30 Day Average: 167/76     Recent Readings 10/15/2020 10/15/2020 8/3/2020 8/3/2020 8/1/2020    SBP (mmHg) 167 167 156 156 201    DBP (mmHg) 76 76 72 72 79    Pulse 61 61 63 63 69                 Depression Screening  Did not address depression screening.    Sleep Apnea Screening    Did not address sleep apnea screening.     Medication Affordability Screening  Did not address medication affordability screening.     Medication Adherence-Medication adherence was assessed.          ASSESSMENT(S)  Patients BP average is 167/76 mmHg, which is above goal. Patient's BP goal is less than or equal to 140/90.   Unable to assess due to lack of readings while in rehab.  Encouraged patient to continue taking BP medications as prescribed and charge device in order to resume measuring for monitoring. F/u in 4 weeks.       Hypertension Plan  Additional monitoring needed.  Continue current therapy.       Addressed  patient questions and patient has my contact information if needed prior to next outreach. Patient verbalizes understanding.             There are no preventive care reminders to display for this patient.  There are no preventive care reminders to display for this patient.      Hypertension Medications             aliskiren (TEKTURNA) 300 MG Tab Take 1 tablet (300 mg total) by mouth once daily.    doxazosin (CARDURA) 1 MG tablet Take 1 tablet (1 mg total) by mouth once daily.    felodipine (PLENDIL) 2.5 MG Tb24 TAKE ONE TABLET IN THE MORNING AND ONE TABLET IN THE EVENING    labetaloL (NORMODYNE) 100 MG tablet Take 1 tablet (100 mg total) by mouth every 12 (twelve) hours. TAKE 2 TABLETS (200 MG TOTAL) BY MOUTH EVERY 12 (TWELVE) HOURS.

## 2020-10-16 NOTE — PROGRESS NOTES
10/16/20  No Answer/Busy - 167/76 - Patient requested to call me back in 10 minutes  - no measures in 2 months due to being in rehab. Make sure device was charged.  Last 5 Patient Entered Readings                                      Current 30 Day Average: 167/76     Recent Readings 10/15/2020 10/15/2020 8/3/2020 8/3/2020 8/1/2020    SBP (mmHg) 167 167 156 156 201    DBP (mmHg) 76 76 72 72 79    Pulse 61 61 63 63 69        Hypertension Medications             aliskiren (TEKTURNA) 300 MG Tab Take 1 tablet (300 mg total) by mouth once daily.    doxazosin (CARDURA) 1 MG tablet Take 1 tablet (1 mg total) by mouth once daily.    felodipine (PLENDIL) 2.5 MG Tb24 TAKE ONE TABLET IN THE MORNING AND ONE TABLET IN THE EVENING    labetaloL (NORMODYNE) 100 MG tablet Take 1 tablet (100 mg total) by mouth every 12 (twelve) hours. TAKE 2 TABLETS (200 MG TOTAL) BY MOUTH EVERY 12 (TWELVE) HOURS.

## 2020-10-16 NOTE — PROGRESS NOTES
"  Physical Therapy Treatment Note     Name: Gerda Lai  Clinic Number: [id number]    Therapy Diagnosis:   Encounter Diagnoses   Name Primary?    Decreased range of motion (ROM) of left knee     Impaired functional mobility, balance, gait, and endurance     Acute pain of left knee     Decreased strength      Physician: Stacy Harris NP    Visit Date: 10/16/2020    Physician Orders: PT Eval and Treat S/p left knee replacement by Dr. Pacheco. She has completed home health and is ready to proceed with outpatient PT  Medical Diagnosis from Referral: Status post knee replacement, unspecified laterality  Evaluation Date: 9/2/2020  Authorization Period Expiration: 8/27/21  Plan of Care Expiration: 10/30/20  Visit # / Visits authorized: 13/20 (plus evaluation)    Time In: 11:03 AM (late arrival)  Time Out: 11:48 AM  Total Billable Time: 38 minutes      Precautions: Standard and Fall    Subjective     Pt reports: has less pain today. "I think I had a breakthrough yesterday."   She  Has been compliant with HEP  Response to previous treatment: felt better   Functional change: walking better    Pain:1-2/10  Location: bilateral  knees    Objective   Pt arrives amb with SPC today- pt demo's improved gait mechanics, less antalgic today    Gerda received therapeutic exercises to develop strength, endurance, ROM, flexibility and posture for 33minutes including:      Recumbent bike, level 1,  6 min  Heel raises x 20 reps  Toe raises x 20 reps  Gastroc and soleus stretch on incline,  x 1min each  Standing with 1# ankle weights, 2 x 10 reps of following on BLE's:  -Hip ABD, Ext, Flex (SLR)  Hamstring stretch c/ strap, 1 min/LE            Gerda received the following manual therapy techniques: Soft tissue Mobilization were applied to the: LLE for 5minutes.   Roller and STM to L quad, ITB, piriformis and hamstring          Not performed today:  L hamstring curl, 2 x 10 reps, orange band  Bridge with green band, 2 x 10 " reps  Clamshell, LLE, 3 x 10 reps  Gastroc stretch with sheet, 3 x 30s, LLE  soleus stretch with sheet, 2 x 30s, LLE  L Heel slides, 10 x 10s  LAQ 2 x 10/LE  SLR, 2x10 LLE    Standing marching 2 x 10     Mini squats x 12   Hip flexor stretch off mat LLE 2 x 30 seconds   B SAQ (short arc quad) with 2.5#, 2 x 12 reps/LE  Bridge on bolster, 2 x 12 reps    Home Exercises Provided and Patient Education Provided     Education provided:   - HEP, exercise technique.     Written Home Exercises Provided: Patient instructed to cont prior HEP.  Exercises were reviewed and Gerda was able to demonstrate them prior to the end of the session.  Gerda demonstrated good  understanding of the education provided.     See EMR under Patient Instructions for exercises provided 9/10/20 and 10/9/20.    Assessment     Patient tolerated session well- she was able to tolerate progression of standing exercises. She responds well to manual techniques. She demo's improved ambulation pattern today and reports reduced pain overall. Will progress as tolerated.   Gerda is progressing well towards her goals.   Pt prognosis is Good.     Pt will continue to benefit from skilled outpatient physical therapy to address the deficits listed in the problem list box on initial evaluation, provide pt/family education and to maximize pt's level of independence in the home and community environment.     Pt's spiritual, cultural and educational needs considered and pt agreeable to plan of care and goals.     Anticipated barriers to physical therapy: advanced age    Goals:   Short Term Goals: 4 weeks   1. Pt will tolerate HEP for improved strength, functional mobility, ROM, posture, and endurance. (Met, 9/29/20)  2. Pt will report reduced L knee pain to </= 1/10 for improved functional mobility and ability to participate in functional activities/ADL's. (progressing, not met)  3. Pt will increase L knee ext AROM to 0 degrees for improved functional mobility and  gait mechanics. (progressing, not met)  4. Pt will increase L knee flexion AROM to >/= 120 degrees for improved functional mobility and gait mechanics. (Met, 10/9  5. Pt will demo >/= 4+/5 strength in BLE's for improved functional mobility, endurance, and posture. (progressing, not met)  6. Pt will report improved ability to put on shoes and socks without increase in pain/symptoms for improved functional mobility (Met, 10/9)  7. Pt will amb x >/=500 feet with Mod I and least restrictive device for improved function and community mobility (progressing, not met)     Long Term Goals: 8 weeks   1. Pt will be I with updated HEP for improved functional mobility, posture, strength, and endurance. (progressing, not met)     2. Pt will demo 5/5 strength in BLE's for improved functional mobility, endurance, and posture. (progressing, not met)  3. Pt will negotiate 1 flight of stairs with Mod I and without pain/symptoms for improved functional mobility (progressing, not met)  4.      Pt will amb x >/=300  feet with Mod I and no device for improved function and community mobility (progressing, not met)  5. Pt will reduced L knee circumference measurements by 1 cm each improved functional mobility and gait mechanics. (progressing, not met)        Plan   Plan of care Certification: 9/2/2020 to 10/30/20.     Outpatient Physical Therapy 2 times weekly for 8 weeks to include the following interventions: Gait Training, Manual Therapy, Moist Heat/ Ice, Neuromuscular Re-ed, Orthotic Management and Training, Patient Education, Self Care, Therapeutic Activites, Therapeutic Exercise and modalities as appropriate.     Incorporate standing exercises, especially Hip ABD as tolerated.      Cristina Meadows, PT     Physical Therapy Treatment Note     Name: Gerda Lai  Clinic Number: [id number]    Therapy Diagnosis:   Encounter Diagnoses   Name Primary?    Decreased range of motion (ROM) of left knee     Impaired functional mobility, balance,  gait, and endurance     Acute pain of left knee     Decreased strength      Physician: Stacy Harris NP    Visit Date: 10/16/2020    Physician Orders: PT Eval and Treat S/p left knee replacement by Dr. Pacheco. She has completed home health and is ready to proceed with outpatient PT  Medical Diagnosis from Referral: Status post knee replacement, unspecified laterality  Evaluation Date: 9/2/2020  Authorization Period Expiration: 8/27/21  Plan of Care Expiration: 10/30/20  Visit # / Visits authorized: 10/20 (plus evaluation)    Time In: 12:23 PM  Time Out: 110PM  Total Billable Time: 40 minutes      Precautions: Standard and Fall    Subjective     Pt reports: no new complaints. She is walking more with the walker. She states she gets tight in the back of her left knee, like a knot  She  Has been compliant with HEP  Response to previous treatment: no issues    Functional change: none stated    Pain:3- /10  Location: left foot    Objective       Gerda received therapeutic exercises to develop strength, endurance, ROM, flexibility and posture for 25minutes including:    Recumbent bike, level 2, 6 min  Gastroc and soleus stretch on incline, 1 min each  Hamstring stretch on step, 2 x 30s/LE- has to stop due to pain  LLE step-ups on bottom step x ~10, but pt endorses pain  Clamshell, LLE, 3 x 10 reps  Bridge with band, 2 x 10 reps, green band        Gerda received the following manual therapy techniques: Soft tissue Mobilization were applied to the: LLE for 15 minutes.   Roller and STM to L quad, ITB, piriformis and hamstring  L knee distraction in sitting      Ice to L knee x 10 min at end of session      Not performed today:  LAQ 2 x 10/LE  SLR, 2x10 LLE  Standing hip abduction 2 x 10  Standing heel/toe raises 2 x 10   Standing marching 2 x 10   Gastroc and soleus stretch on incline,  x 1min each  Seated hamstring curl, 2 x 10 reps, orange band  Standing hip Ext, LLE only, 2 x 10 reps  L heel slides with strap, 10  x 5s  Mini squats x 12   Hip flexor stretch off mat LLE 2 x 30 seconds   B SAQ (short arc quad) with 2.5#, 2 x 12 reps/LE  Bridge on bolster, 2 x 12 reps  L hamstring stretch c/ strap, 2 x 30s  Home Exercises Provided and Patient Education Provided     Education provided:   - HEP, exercise technique. Encouraged RW use to reduce pain in B knees    Written Home Exercises Provided: yes.  Exercises were reviewed and Gerda was able to demonstrate them prior to the end of the session.  Gerda demonstrated good  understanding of the education provided.     See EMR under Patient Instructions for exercises provided 9/10/20.    Assessment     Patient tolerated session fairly well. Increased time spent on soft tissue work, which pt responds well to. She states she feels better at end of session.   PT emphasized importance of stretching at home 2x/day- pt verbalizes understanding.  Will progress as tolerated.   Gerda is progressing well towards her goals.   Pt prognosis is Good.     Pt will continue to benefit from skilled outpatient physical therapy to address the deficits listed in the problem list box on initial evaluation, provide pt/family education and to maximize pt's level of independence in the home and community environment.     Pt's spiritual, cultural and educational needs considered and pt agreeable to plan of care and goals.     Anticipated barriers to physical therapy: advanced age    Goals:   Short Term Goals: 4 weeks   8. Pt will tolerate HEP for improved strength, functional mobility, ROM, posture, and endurance. (Met, 9/29/20)  9. Pt will report reduced L knee pain to </= 1/10 for improved functional mobility and ability to participate in functional activities/ADL's. (progressing, not met)  10. Pt will increase L knee ext AROM to 0 degrees for improved functional mobility and gait mechanics. (progressing, not met)  11. Pt will increase L knee flexion AROM to >/= 120 degrees for improved functional mobility  and gait mechanics. (progressing, not met)  12. Pt will demo >/= 4+/5 strength in BLE's for improved functional mobility, endurance, and posture. (progressing, not met)  13. Pt will report improved ability to put on shoes and socks without increase in pain/symptoms for improved functional mobility (progressing, not met)  14. Pt will amb x >/=500 feet with Mod I and least restrictive device for improved function and community mobility (progressing, not met)     Long Term Goals: 8 weeks   2. Pt will be I with updated HEP for improved functional mobility, posture, strength, and endurance. (progressing, not met)     4. Pt will demo 5/5 strength in BLE's for improved functional mobility, endurance, and posture. (progressing, not met)  5. Pt will negotiate 1 flight of stairs with Mod I and without pain/symptoms for improved functional mobility (progressing, not met)  4.      Pt will amb x >/=300  feet with Mod I and no device for improved function and community mobility (progressing, not met)  5. Pt will reduced L knee circumference measurements by 1 cm each improved functional mobility and gait mechanics. (progressing, not met)        Plan   Plan of care Certification: 9/2/2020 to 10/30/20.     Outpatient Physical Therapy 2 times weekly for 8 weeks to include the following interventions: Gait Training, Manual Therapy, Moist Heat/ Ice, Neuromuscular Re-ed, Orthotic Management and Training, Patient Education, Self Care, Therapeutic Activites, Therapeutic Exercise and modalities as appropriate.     Incorporate standing exercises, especially Hip ABD as tolerated.      Cristina Meadows, PT

## 2020-10-26 DIAGNOSIS — I10 HYPERTENSION, ESSENTIAL: Chronic | ICD-10-CM

## 2020-10-26 RX ORDER — LORAZEPAM 0.5 MG/1
.25-.5 TABLET ORAL EVERY 12 HOURS PRN
Qty: 30 TABLET | Refills: 0 | Status: SHIPPED | OUTPATIENT
Start: 2020-10-26 | End: 2020-12-04

## 2020-11-02 ENCOUNTER — CLINICAL SUPPORT (OUTPATIENT)
Dept: REHABILITATION | Facility: HOSPITAL | Age: 85
End: 2020-11-02
Attending: INTERNAL MEDICINE
Payer: MEDICARE

## 2020-11-02 DIAGNOSIS — M25.562 ACUTE PAIN OF LEFT KNEE: ICD-10-CM

## 2020-11-02 DIAGNOSIS — M25.662 DECREASED RANGE OF MOTION (ROM) OF LEFT KNEE: ICD-10-CM

## 2020-11-02 DIAGNOSIS — R53.1 DECREASED STRENGTH: ICD-10-CM

## 2020-11-02 DIAGNOSIS — Z74.09 IMPAIRED FUNCTIONAL MOBILITY, BALANCE, GAIT, AND ENDURANCE: ICD-10-CM

## 2020-11-02 PROCEDURE — 97750 PHYSICAL PERFORMANCE TEST: CPT | Mod: PO

## 2020-11-02 PROCEDURE — 97110 THERAPEUTIC EXERCISES: CPT | Mod: PO

## 2020-11-02 NOTE — PLAN OF CARE
Outpatient Therapy Updated Plan of Care     Visit Date: 11/2/2020  Name: Gerda Lai  Clinic Number: [id number]    Therapy Diagnosis:   Encounter Diagnoses   Name Primary?    Decreased range of motion (ROM) of left knee     Impaired functional mobility, balance, gait, and endurance     Acute pain of left knee     Decreased strength      Physician: Stacy Harris, NP    Physician Orders: PT Eval and Treat S/p left knee replacement by Dr. Pacheco. She has completed home health and is ready to proceed with outpatient PT  Medical Diagnosis from Referral: Status post knee replacement, unspecified laterality  Evaluation Date: 9/2/2020    Total Visits Received: 15  Cancelled Visits: unknown  No Show Visits: >/=1    Current Certification Period:  9/2/20 to 10/30/20  Precautions:  Standard, Fall  Visits from Evaluation Date:  14    Subjective     Update: Could walk to vote and had to be on er feet for while, but I did OK.  See today's treatment note    Objective     Update: Range of Motion:   Knee Left active Right Active   Flexion 125 120   Extension -1 0               Lower Extremity Strength (Seated)  Right LE   Left LE     Knee extension: 5/5 Knee extension: 5/5   Knee flexion: 5/5 Knee flexion: 5/5   Hip flexion: 5/5 Hip flexion: 5/5   Hip extension:  >/=4+/5 Hip extension: >/=4/5   Hip abduction: 4+/5 Hip abduction: 5/5   Hip adduction: 5/5 Hip adduction 5/5   Ankle dorsiflexion: 5/5 Ankle dorsiflexion: 5/5   Ankle plantarflexion: 5/5 Ankle plantarflexion: 5/5      Sit <> stands in 30s: 8 from gold chair with use of arms    TUG from gold chair with SPC: 18s      Assessment     Update: Patient tolerated session well. She demo's improved knee ROM and LE strength. Her walking has improved overall as well. She presents with swelling in L knee, so utilized taping technique today- will follow up at next session, regarding response. She has met goals as noted below, but still presents with deficits in strength, ROM,  and function. Will progress as tolerated, working towards remaining and added goals. Extending POC to 12/4/20.    Previous Short Term Goals Status:   Met partially  New Short Term Goals Status:   N/A  Long Term Goal Status:   continue per initial plan of care with addition of following:  New goals (11/2/20):  6. Pt will reduce TUG (timed up and go) time to </= 11 sec for improved safety with community ambulation and decreased falls risk. (progressing, not met)  7. Pt will perform >/=11 sit <> stands with use of armrests in 30s for improved endurance. (progressing, not met)        Reasons for Recertification of Therapy:   Pt making steady progress and will benefit from therapy to address remaining deficits in strength, motion, endurance, function, an gait.    Plan     Updated Certification Period: 11/2/2020 to 12/4/20  Recommended Treatment Plan: 2 times per week for 4 weeks: Gait Training, Manual Therapy, Moist Heat/ Ice, Neuromuscular Re-ed, Orthotic Management and Training, Patient Education, Self Care, Therapeutic Activites, Therapeutic Exercise and modalities and taping as appropriate  Other Recommendations: none anticipated    Cristina Meadows, PT  11/2/2020      I CERTIFY THE NEED FOR THESE SERVICES FURNISHED UNDER THIS PLAN OF TREATMENT AND WHILE UNDER MY CARE    Physician's comments:        Physician's Signature: ___________________________________________________

## 2020-11-02 NOTE — PROGRESS NOTES
Physical Therapy Treatment Note     Name: Gerda Lai  Clinic Number: [id number]    Therapy Diagnosis:   Encounter Diagnoses   Name Primary?    Decreased range of motion (ROM) of left knee     Impaired functional mobility, balance, gait, and endurance     Acute pain of left knee     Decreased strength      Physician: Stacy Harris NP    Visit Date: 11/2/2020    Physician Orders: PT Eval and Treat S/p left knee replacement by Dr. Pacheco. She has completed home health and is ready to proceed with outpatient PT  Medical Diagnosis from Referral: Status post knee replacement, unspecified laterality  Evaluation Date: 9/2/2020  Authorization Period Expiration: 8/27/21  Updated Plan of Care Expiration: 12/4/20  Visit # / Visits authorized: 14/20 (plus evaluation)    Time In: 1:34 PM (late arrival)  Time Out: 2:15 PM  Total Billable Time: 34 minutes      Precautions: Standard and Fall    Subjective     Pt reports: Had more pain this morning, but she put some aspercreme on both, which feels better. Endorses she still has swelling in her L knee.   She  Has been compliant with HEP  Response to previous treatment: felt better   Functional change: walking better    Pain:1/10  Location: bilateral  knees    Objective     Gerda participates in Physical Performance testing with report x 20 min. Please refer to Plan of Care objective section for full details    Gerda received therapeutic exercises to develop strength, endurance, ROM, flexibility and posture for 14minutes including:      Recumbent bike, level 2,  6 min  LLE Quad sets x 20 reps   Pt educated on purpose, wear, and removal of taping technique- PT applied theraband brand tape with B fan pattern at ~10% tension over L knee to facilitate edema management. Pt verbalizes understanding of all education      Not performed today:  Heel raises x 20 reps  Toe raises x 20 reps  Gastroc and soleus stretch on incline,  x 1min each  Standing with 1# ankle weights, 2 x 10  reps of following on BLE's:  -Hip ABD, Ext, Flex (SLR)  Hamstring stretch c/ strap, 1 min/LE  L hamstring curl, 2 x 10 reps, orange band  Bridge with green band, 2 x 10 reps  Clamshell, LLE, 3 x 10 reps  L Heel slides, 10 x 10s  LAQ 2 x 10/LE  SLR, 2x10 LLE    Home Exercises Provided and Patient Education Provided     Education provided:   - progress to date, POC, taping technique    Written Home Exercises Provided: Patient instructed to cont prior HEP.  Exercises were reviewed and Gerda was able to demonstrate them prior to the end of the session.  Gerda demonstrated good  understanding of the education provided.     See EMR under Patient Instructions for exercises provided 9/10/20 and 10/9/20.    Assessment     Patient tolerated session well. She demo's improved knee ROM and LE strength. Her walking has improved overall as well. She presents with swelling in L knee, so utilized taping technique today- will follow up at next session, regarding response. She has met goals as noted below, but still presents with deficits in strength, ROM, and function. Will progress as tolerated, working towards remaining and added goals. Extending POC to 12/4/20.    Gerda is progressing well towards her goals.   Pt prognosis is Good.     Pt will continue to benefit from skilled outpatient physical therapy to address the deficits listed in the problem list box on initial evaluation, provide pt/family education and to maximize pt's level of independence in the home and community environment.     Pt's spiritual, cultural and educational needs considered and pt agreeable to plan of care and goals.     Anticipated barriers to physical therapy: advanced age    Goals:   Short Term Goals: 4 weeks   1. Pt will tolerate HEP for improved strength, functional mobility, ROM, posture, and endurance. (Met, 9/29/20)  2. Pt will report reduced L knee pain to </= 1/10 for improved functional mobility and ability to participate in functional  activities/ADL's. (progressing, not met)  3. Pt will increase L knee ext AROM to 0 degrees for improved functional mobility and gait mechanics. (progressing, not met)  4. Pt will increase L knee flexion AROM to >/= 120 degrees for improved functional mobility and gait mechanics. (Met, 10/9  5. Pt will demo >/= 4+/5 strength in BLE's for improved functional mobility, endurance, and posture. (progressing, not met)  6. Pt will report improved ability to put on shoes and socks without increase in pain/symptoms for improved functional mobility (Met, 10/9)  7. Pt will amb x >/=500 feet with Mod I and least restrictive device for improved function and community mobility (Met. 11/2/20)     Long Term Goals: 8 weeks   1. Pt will be I with updated HEP for improved functional mobility, posture, strength, and endurance. (progressing, not met)     2. Pt will demo 5/5 strength in BLE's for improved functional mobility, endurance, and posture. (progressing, not met)  3. Pt will negotiate 1 flight of stairs with Mod I and without pain/symptoms for improved functional mobility (progressing, not met)  4.      Pt will amb x >/=300  feet with Mod I and no device for improved function and community mobility (progressing, not met)  5. Pt will reduced L knee circumference measurements by 1 cm each improved functional mobility and gait mechanics. (progressing, not met)  New goals (11/2/20):  6. Pt will reduce TUG (timed up and go) time to </= 11 sec for improved safety with community ambulation and decreased falls risk. (progressing, not met)  7. Pt will perform >/=11 sit <> stands with use of armrests in 30s for improved endurance. (progressing, not met)          Plan   Plan of care Certification: 11/2/20- 12/4/20     Outpatient Physical Therapy 2 times weekly for 4 weeks to include the following interventions: Gait Training, Manual Therapy, Moist Heat/ Ice, Neuromuscular Re-ed, Orthotic Management and Training, Patient Education, Self  Care, Therapeutic Activites, Therapeutic Exercise and modalities as appropriate.     .      Cristina Meadows, PT

## 2020-11-04 ENCOUNTER — OFFICE VISIT (OUTPATIENT)
Dept: ORTHOPEDICS | Facility: CLINIC | Age: 85
End: 2020-11-04
Payer: MEDICARE

## 2020-11-04 ENCOUNTER — CLINICAL SUPPORT (OUTPATIENT)
Dept: REHABILITATION | Facility: HOSPITAL | Age: 85
End: 2020-11-04
Attending: INTERNAL MEDICINE
Payer: MEDICARE

## 2020-11-04 ENCOUNTER — PATIENT MESSAGE (OUTPATIENT)
Dept: ADMINISTRATIVE | Facility: OTHER | Age: 85
End: 2020-11-04

## 2020-11-04 VITALS — BODY MASS INDEX: 24.35 KG/M2 | WEIGHT: 142.63 LBS | HEIGHT: 64 IN

## 2020-11-04 DIAGNOSIS — M17.11 PRIMARY OSTEOARTHRITIS OF RIGHT KNEE: ICD-10-CM

## 2020-11-04 DIAGNOSIS — M25.562 ACUTE PAIN OF LEFT KNEE: ICD-10-CM

## 2020-11-04 DIAGNOSIS — M17.12 PRIMARY OSTEOARTHRITIS OF LEFT KNEE: Primary | ICD-10-CM

## 2020-11-04 DIAGNOSIS — M25.662 DECREASED RANGE OF MOTION (ROM) OF LEFT KNEE: ICD-10-CM

## 2020-11-04 DIAGNOSIS — R53.1 DECREASED STRENGTH: ICD-10-CM

## 2020-11-04 DIAGNOSIS — Z74.09 IMPAIRED FUNCTIONAL MOBILITY, BALANCE, GAIT, AND ENDURANCE: ICD-10-CM

## 2020-11-04 PROCEDURE — 99999 PR PBB SHADOW E&M-EST. PATIENT-LVL III: ICD-10-PCS | Mod: PBBFAC,,, | Performed by: ORTHOPAEDIC SURGERY

## 2020-11-04 PROCEDURE — 99213 OFFICE O/P EST LOW 20 MIN: CPT | Mod: PBBFAC,25 | Performed by: ORTHOPAEDIC SURGERY

## 2020-11-04 PROCEDURE — 99024 POSTOP FOLLOW-UP VISIT: CPT | Mod: POP,,, | Performed by: ORTHOPAEDIC SURGERY

## 2020-11-04 PROCEDURE — 20610 LARGE JOINT ASPIRATION/INJECTION: R KNEE: ICD-10-PCS | Mod: S$PBB,79,RT, | Performed by: ORTHOPAEDIC SURGERY

## 2020-11-04 PROCEDURE — 99999 PR PBB SHADOW E&M-EST. PATIENT-LVL III: CPT | Mod: PBBFAC,,, | Performed by: ORTHOPAEDIC SURGERY

## 2020-11-04 PROCEDURE — 97110 THERAPEUTIC EXERCISES: CPT | Mod: PO,CQ

## 2020-11-04 PROCEDURE — 20610 DRAIN/INJ JOINT/BURSA W/O US: CPT | Mod: PBBFAC | Performed by: ORTHOPAEDIC SURGERY

## 2020-11-04 PROCEDURE — 99024 PR POST-OP FOLLOW-UP VISIT: ICD-10-PCS | Mod: POP,,, | Performed by: ORTHOPAEDIC SURGERY

## 2020-11-04 RX ADMIN — TRIAMCINOLONE ACETONIDE 40 MG: 40 INJECTION, SUSPENSION INTRA-ARTICULAR; INTRAMUSCULAR at 02:11

## 2020-11-04 NOTE — PROGRESS NOTES
Physical Therapy Treatment Note     Name: Gerda Lai  Clinic Number: [id number]    Therapy Diagnosis:   Encounter Diagnoses   Name Primary?    Decreased range of motion (ROM) of left knee     Impaired functional mobility, balance, gait, and endurance     Acute pain of left knee     Decreased strength      Physician: Stacy Harris NP    Visit Date: 11/4/2020    Physician Orders: PT Eval and Treat S/p left knee replacement by Dr. Pacheco. She has completed home health and is ready to proceed with outpatient PT  Medical Diagnosis from Referral: Status post knee replacement, unspecified laterality  Evaluation Date: 9/2/2020  Authorization Period Expiration: 8/27/21  Updated Plan of Care Expiration: 12/4/20  Visit # / Visits authorized: 15/20 (plus evaluation)    Time In: 12:35 pm  Time Out:01:00 pm   Total Billable Time: 40 minutes    TE - 3     Precautions: Standard and Fall    Subjective     Pt reports:   She  Has been compliant with HEP  Response to previous treatment: felt better   Functional change: walking better    Pain:1/10  Location: bilateral  knees    Objective       Gerda received therapeutic exercises to develop strength, endurance, ROM, flexibility and posture for 40 minutes including:      Recumbent bike, level 2,  6 min  Standing with 1# ankle weights, 2 x 10 reps of following on BLE's:  -Hip ABD, Ext, Flex (SLR)    LLE Quad sets x 20 reps  Hamstring stretch c/ strap, 1 min/LE  L hamstring curl, 2 x 10 reps, orange band  Bridge with green band, 2 x 10 reps  Clamshell, LLE, 3 x 10 reps  L Heel slides, 10 x 10s  LAQ 2 x 10/LE  SLR, 2x10 LLE    Not performed this session:   Pt educated on purpose, wear, and removal of taping technique- PT applied theraband brand tape with B fan pattern at ~10% tension over L knee to facilitate edema management. Pt verbalizes understanding of all education      Not performed today:  Heel raises x 20 reps  Toe raises x 20 reps  Gastroc and soleus stretch on  incline,  x 1min each    SLR, 2x10 LLE    Home Exercises Provided and Patient Education Provided     Education provided:   - progress to date, POC, taping technique    Written Home Exercises Provided: Patient instructed to cont prior HEP.  Exercises were reviewed and Gerda was able to demonstrate them prior to the end of the session.  Gerda demonstrated good  understanding of the education provided.     See EMR under Patient Instructions for exercises provided 9/10/20 and 10/9/20.    Assessment     Patient tolerated session well. Patient is making good progress towards goals and demos improving range of motion as well as strength.    Gerda is progressing well towards her goals.   Pt prognosis is Good.     Pt will continue to benefit from skilled outpatient physical therapy to address the deficits listed in the problem list box on initial evaluation, provide pt/family education and to maximize pt's level of independence in the home and community environment.     Pt's spiritual, cultural and educational needs considered and pt agreeable to plan of care and goals.     Anticipated barriers to physical therapy: advanced age    Goals:   Short Term Goals: 4 weeks   1. Pt will tolerate HEP for improved strength, functional mobility, ROM, posture, and endurance. (Met, 9/29/20)  2. Pt will report reduced L knee pain to </= 1/10 for improved functional mobility and ability to participate in functional activities/ADL's. (progressing, not met)  3. Pt will increase L knee ext AROM to 0 degrees for improved functional mobility and gait mechanics. (progressing, not met)  4. Pt will increase L knee flexion AROM to >/= 120 degrees for improved functional mobility and gait mechanics. (Met, 10/9  5. Pt will demo >/= 4+/5 strength in BLE's for improved functional mobility, endurance, and posture. (progressing, not met)  6. Pt will report improved ability to put on shoes and socks without increase in pain/symptoms for improved  functional mobility (Met, 10/9)  7. Pt will amb x >/=500 feet with Mod I and least restrictive device for improved function and community mobility (Met. 11/2/20)     Long Term Goals: 8 weeks   1. Pt will be I with updated HEP for improved functional mobility, posture, strength, and endurance. (progressing, not met)     2. Pt will demo 5/5 strength in BLE's for improved functional mobility, endurance, and posture. (progressing, not met)  3. Pt will negotiate 1 flight of stairs with Mod I and without pain/symptoms for improved functional mobility (progressing, not met)  4.      Pt will amb x >/=300  feet with Mod I and no device for improved function and community mobility (progressing, not met)  5. Pt will reduced L knee circumference measurements by 1 cm each improved functional mobility and gait mechanics. (progressing, not met)  New goals (11/2/20):  6. Pt will reduce TUG (timed up and go) time to </= 11 sec for improved safety with community ambulation and decreased falls risk. (progressing, not met)  7. Pt will perform >/=11 sit <> stands with use of armrests in 30s for improved endurance. (progressing, not met)          Plan   Plan of care Certification: 11/2/20- 12/4/20     Outpatient Physical Therapy 2 times weekly for 4 weeks to include the following interventions: Gait Training, Manual Therapy, Moist Heat/ Ice, Neuromuscular Re-ed, Orthotic Management and Training, Patient Education, Self Care, Therapeutic Activites, Therapeutic Exercise and modalities as appropriate.     .      Evelyn Grossman, PTA

## 2020-11-06 RX ORDER — TRIAMCINOLONE ACETONIDE 40 MG/ML
40 INJECTION, SUSPENSION INTRA-ARTICULAR; INTRAMUSCULAR
Status: DISCONTINUED | OUTPATIENT
Start: 2020-11-04 | End: 2020-11-06 | Stop reason: HOSPADM

## 2020-11-06 NOTE — PROCEDURES
Large Joint Aspiration/Injection: R knee    Date/Time: 11/4/2020 2:00 PM  Performed by: Kalin Pacheco MD  Authorized by: Kalin Pacheco MD     Consent Done?:  Yes (Verbal)  Indications:  Pain  Site marked: the procedure site was marked    Timeout: prior to procedure the correct patient, procedure, and site was verified    Prep: patient was prepped and draped in usual sterile fashion      Local anesthesia used?: Yes    Local anesthetic:  Bupivacaine 0.25% without epinephrine  Anesthetic total (ml):  5      Details:  Needle Size:  25 G  Ultrasonic Guidance for needle placement?: No    Approach:  Anterolateral  Location:  Knee  Site:  R knee  Medications:  40 mg triamcinolone acetonide 40 mg/mL  Patient tolerance:  Patient tolerated the procedure well with no immediate complications

## 2020-11-06 NOTE — PROGRESS NOTES
Subjective:      Patient ID: Gerda Lai is a 87 y.o. female.    Chief Complaint:   Post-op Evaluation of the Left Knee    HPI  She returns for three-month follow-up of left TKA.  She is very pleased with her result and has no significant left knee pain.  Her right knee arthritis is painful from arthrosis, and she would like to consider cortisone injections there.  No new symptoms.  No fever, chills, night sweats.      Objective:        Ortho/SPM Exam  Left knee:  Well-healed incision without redness or tenderness.  Active range of motion 0-120 degrees.  No instability at any position of flexion.  Right knee:  There is advanced valgus deformity, which is partially passively correctable.  Active range of motion is 5-115 degrees.  Anterior crepitus with active extension.  Patellar mobility is limited.  Boggy synovitis without effusion.  Medial joint line tenderness predominates.  No instability to varus/valgus/Lachman's stressing.  No pain in the groin with flexion and internal rotation of the hip which is not limited.  Skin intact.  Distal neurovascular intact.  Flip test negative.          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  Today's x-rays of the left knee show a well-fixed and positioned left total knee arthroplasty without signs of loosening or other complications.  Assessment:   Status post left TKA  Right knee DJD    Plan:   We did inject the right knee today, see separate note.  She will come in for anniversary follow-up for the left knee.    The patient's pathophysiology was explained in detail with reference to x-rays, models, other visual aids as appropriate.  Treatment options were discussed in detail.  Questions were invited and answered to the patient's satisfaction.    Greater than 50% of this 15 minute visit was devoted to counseling and coordination of care      Kalin Pacheco MD  Orthopedic Surgery

## 2020-11-09 ENCOUNTER — CLINICAL SUPPORT (OUTPATIENT)
Dept: REHABILITATION | Facility: HOSPITAL | Age: 85
End: 2020-11-09
Attending: INTERNAL MEDICINE
Payer: MEDICARE

## 2020-11-09 DIAGNOSIS — M25.562 ACUTE PAIN OF LEFT KNEE: ICD-10-CM

## 2020-11-09 DIAGNOSIS — M25.662 DECREASED RANGE OF MOTION (ROM) OF LEFT KNEE: ICD-10-CM

## 2020-11-09 DIAGNOSIS — Z74.09 IMPAIRED FUNCTIONAL MOBILITY, BALANCE, GAIT, AND ENDURANCE: ICD-10-CM

## 2020-11-09 DIAGNOSIS — R53.1 DECREASED STRENGTH: ICD-10-CM

## 2020-11-09 PROCEDURE — 97110 THERAPEUTIC EXERCISES: CPT | Mod: PO

## 2020-11-09 NOTE — PROGRESS NOTES
Physical Therapy Treatment Note     Name: Gerda Lai  Clinic Number: [id number]    Therapy Diagnosis:   Encounter Diagnoses   Name Primary?    Decreased range of motion (ROM) of left knee     Impaired functional mobility, balance, gait, and endurance     Acute pain of left knee     Decreased strength      Physician: Stacy Harris NP    Visit Date: 11/9/2020    Physician Orders: PT Eval and Treat S/p left knee replacement by Dr. Pacheco. She has completed home health and is ready to proceed with outpatient PT  Medical Diagnosis from Referral: Status post knee replacement, unspecified laterality  Evaluation Date: 9/2/2020  Authorization Period Expiration: 8/27/21  Updated Plan of Care Expiration: 12/4/20  Visit # / Visits authorized: 16/20 (plus evaluation)    Time In: 12:52 pm (late arrival)  Time Out:01:43pm   Total Billable Time: 45 minutes    TE - 3     Precautions: Standard and Fall    Subjective     Pt reports: No new complaints. Feels pretty good today. States the tape helped with swelling. Her MD said she did not need to return to him for 1 year.  She  Has been compliant with HEP  Response to previous treatment: felt better   Functional change: walking better    Pain:1-2/10  Location: bilateral  knees    Objective   HR: 74, SpO2: 97%, BP: 127/67mm Hg    Gerda received therapeutic exercises to develop strength, endurance, ROM, flexibility and posture for 45 minutes including:      Recumbent bike, level 2,  6 min  Standing on airex:  - 2 x 10 reps of heel raises  -2 x 10 reps of toe raises  -2 x 10 reps each c/ 1# ankle weights:   -Hip ABD, Ext,   Gastroc stretch on incline, 1min    Seated hamstring curl, 2 x 10 reps/ LE, green band  SAQ, 2.5#, 2 x 10/RLE and 3/10 on LLE  Hamstring stretch c/ strap, 1 min/LE     Pt educated on purpose, wear, and removal of taping technique- PT applied theraband brand tape with B fan pattern at ~10% tension over L knee to facilitate edema management. Pt verbalizes  understanding of all education    Not performed today:  Bridge with green band, 2 x 10 reps  Clamshell, LLE, 3 x 10 reps  L Heel slides, 10 x 10s  LAQ 2 x 10/LE  SLR, 2x10 LLE   soleus stretch on incline,  x 1min each      Home Exercises Provided and Patient Education Provided     Education provided:   - exercise technique, taping technique    Written Home Exercises Provided: Patient instructed to cont prior HEP.  Exercises were reviewed and Gerda was able to demonstrate them prior to the end of the session.  Gerda demonstrated good  understanding of the education provided.     See EMR under Patient Instructions for exercises provided 9/10/20 and 10/9/20.    Assessment     Patient tolerated session well. She reports some dizziness, but her vitals monitored and stable as noted above. Pt able to tolerate increased standing exercises. She reports feeling stronger and that her walking is improving. Re-applied tap today, as pt states this is helpful. Will progress as tolerated. Patient is making good progress towards goals and demos improving range of motion as well as strength.    Gerda is progressing well towards her goals.   Pt prognosis is Good.     Pt will continue to benefit from skilled outpatient physical therapy to address the deficits listed in the problem list box on initial evaluation, provide pt/family education and to maximize pt's level of independence in the home and community environment.     Pt's spiritual, cultural and educational needs considered and pt agreeable to plan of care and goals.     Anticipated barriers to physical therapy: advanced age    Goals:   Short Term Goals: 4 weeks   1. Pt will tolerate HEP for improved strength, functional mobility, ROM, posture, and endurance. (Met, 9/29/20)  2. Pt will report reduced L knee pain to </= 1/10 for improved functional mobility and ability to participate in functional activities/ADL's. (progressing, not met)  3. Pt will increase L knee ext AROM to  0 degrees for improved functional mobility and gait mechanics. (progressing, not met)  4. Pt will increase L knee flexion AROM to >/= 120 degrees for improved functional mobility and gait mechanics. (Met, 10/9  5. Pt will demo >/= 4+/5 strength in BLE's for improved functional mobility, endurance, and posture. (progressing, not met)  6. Pt will report improved ability to put on shoes and socks without increase in pain/symptoms for improved functional mobility (Met, 10/9)  7. Pt will amb x >/=500 feet with Mod I and least restrictive device for improved function and community mobility (Met. 11/2/20)     Long Term Goals: 8 weeks   1. Pt will be I with updated HEP for improved functional mobility, posture, strength, and endurance. (progressing, not met)     2. Pt will demo 5/5 strength in BLE's for improved functional mobility, endurance, and posture. (progressing, not met)  3. Pt will negotiate 1 flight of stairs with Mod I and without pain/symptoms for improved functional mobility (progressing, not met)  4.      Pt will amb x >/=300  feet with Mod I and no device for improved function and community mobility (progressing, not met)  5. Pt will reduced L knee circumference measurements by 1 cm each improved functional mobility and gait mechanics. (progressing, not met)  New goals (11/2/20):  6. Pt will reduce TUG (timed up and go) time to </= 11 sec for improved safety with community ambulation and decreased falls risk. (progressing, not met)  7. Pt will perform >/=11 sit <> stands with use of armrests in 30s for improved endurance. (progressing, not met)          Plan   Plan of care Certification: 11/2/20- 12/4/20     Outpatient Physical Therapy 2 times weekly for 4 weeks to include the following interventions: Gait Training, Manual Therapy, Moist Heat/ Ice, Neuromuscular Re-ed, Orthotic Management and Training, Patient Education, Self Care, Therapeutic Activites, Therapeutic Exercise and modalities as appropriate.      .      Cristina Meadows, PT

## 2020-11-11 ENCOUNTER — CLINICAL SUPPORT (OUTPATIENT)
Dept: REHABILITATION | Facility: HOSPITAL | Age: 85
End: 2020-11-11
Attending: INTERNAL MEDICINE
Payer: MEDICARE

## 2020-11-11 DIAGNOSIS — Z74.09 IMPAIRED FUNCTIONAL MOBILITY, BALANCE, GAIT, AND ENDURANCE: ICD-10-CM

## 2020-11-11 DIAGNOSIS — M25.562 ACUTE PAIN OF LEFT KNEE: ICD-10-CM

## 2020-11-11 DIAGNOSIS — M25.662 DECREASED RANGE OF MOTION (ROM) OF LEFT KNEE: ICD-10-CM

## 2020-11-11 DIAGNOSIS — R53.1 DECREASED STRENGTH: ICD-10-CM

## 2020-11-11 PROCEDURE — 97110 THERAPEUTIC EXERCISES: CPT | Mod: PO

## 2020-11-11 NOTE — PROGRESS NOTES
Physical Therapy Treatment Note     Name: Gerda Lai  Clinic Number: [id number]    Therapy Diagnosis:   Encounter Diagnoses   Name Primary?    Decreased range of motion (ROM) of left knee     Impaired functional mobility, balance, gait, and endurance     Acute pain of left knee     Decreased strength      Physician: Stacy Harris NP    Visit Date: 11/11/2020    Physician Orders: PT Eval and Treat S/p left knee replacement by Dr. Pacheco. She has completed home health and is ready to proceed with outpatient PT  Medical Diagnosis from Referral: Status post knee replacement, unspecified laterality  Evaluation Date: 9/2/2020  Authorization Period Expiration: 8/27/21  Updated Plan of Care Expiration: 12/4/20  Visit # / Visits authorized: 17/20 (plus evaluation)    Time In: 1:16 PM  Time Out: 2:05 PM  Total Billable Time: 42 minutes    TE - 3     Precautions: Standard and Fall    Subjective     Pt reports: No new complaints. Feels pretty good today.   She  Has been compliant with HEP  Response to previous treatment: felt better   Functional change: walking better    Pain:1-2/10  Location: bilateral  knees    Objective       Gerda received therapeutic exercises to develop strength, endurance, ROM, flexibility and posture for 42 minutes including:      Recumbent bike, level 3  6 min  Standing on airex:  -2 x 10 reps of marching  - 2 x 10 reps of heel raises  -2 x 10 reps of toe raises    Seated knee extension, 2#, 2 x 10/LE  Standing Hamstring curls, 2 x 10/LE, 2#  Standing Hip ext, 2x 10/E 2#  Hook-lying Hip ABD, blue band, 2 x 10 reps  Bridge with blue band, 2 x 10 reps  Hamstring stretch c/ strap, 1 min/LE     Pt educated on purpose, wear, and removal of taping technique- PT applied theraband brand tape with B fan pattern at ~10% tension over L knee to facilitate edema management. Pt verbalizes understanding of all education            Not performed today:  MARY Delvalle, 3 x 10 reps  L Heel slides, 10 x  10s  -2 x 10 reps each c/ 1# ankle weights:   -Hip ABD, Ext,   Gastroc stretch on incline, 1min  Seated hamstring curl, 2 x 10 reps/ LE, green band  SAQ, 2.5#, 2 x 10/RLE and 3/10 on LLE  SLR, 2x10 LLE   soleus stretch on incline,  x 1min each      Home Exercises Provided and Patient Education Provided     Education provided:   - exercise technique, taping technique    Written Home Exercises Provided: Patient instructed to cont prior HEP.  Exercises were reviewed and Gerda was able to demonstrate them prior to the end of the session.  Gerda demonstrated good  understanding of the education provided.     See EMR under Patient Instructions for exercises provided 9/10/20 and 10/9/20.    Assessment     Patient tolerated session well. Pt able to progress weight and resistance today. She continues to respond well to taping technique. Will progress as tolerated. Patient is making good progress towards goals and demos improving range of motion as well as strength.    Gerda is progressing well towards her goals.   Pt prognosis is Good.     Pt will continue to benefit from skilled outpatient physical therapy to address the deficits listed in the problem list box on initial evaluation, provide pt/family education and to maximize pt's level of independence in the home and community environment.     Pt's spiritual, cultural and educational needs considered and pt agreeable to plan of care and goals.     Anticipated barriers to physical therapy: advanced age    Goals:   Short Term Goals: 4 weeks   1. Pt will tolerate HEP for improved strength, functional mobility, ROM, posture, and endurance. (Met, 9/29/20)  2. Pt will report reduced L knee pain to </= 1/10 for improved functional mobility and ability to participate in functional activities/ADL's. (progressing, not met)  3. Pt will increase L knee ext AROM to 0 degrees for improved functional mobility and gait mechanics. (progressing, not met)  4. Pt will increase L knee  flexion AROM to >/= 120 degrees for improved functional mobility and gait mechanics. (Met, 10/9  5. Pt will demo >/= 4+/5 strength in BLE's for improved functional mobility, endurance, and posture. (progressing, not met)  6. Pt will report improved ability to put on shoes and socks without increase in pain/symptoms for improved functional mobility (Met, 10/9)  7. Pt will amb x >/=500 feet with Mod I and least restrictive device for improved function and community mobility (Met. 11/2/20)     Long Term Goals: 8 weeks   1. Pt will be I with updated HEP for improved functional mobility, posture, strength, and endurance. (progressing, not met)     2. Pt will demo 5/5 strength in BLE's for improved functional mobility, endurance, and posture. (progressing, not met)  3. Pt will negotiate 1 flight of stairs with Mod I and without pain/symptoms for improved functional mobility (progressing, not met)  4.      Pt will amb x >/=300  feet with Mod I and no device for improved function and community mobility (progressing, not met)  5. Pt will reduced L knee circumference measurements by 1 cm each improved functional mobility and gait mechanics. (progressing, not met)  New goals (11/2/20):  6. Pt will reduce TUG (timed up and go) time to </= 11 sec for improved safety with community ambulation and decreased falls risk. (progressing, not met)  7. Pt will perform >/=11 sit <> stands with use of armrests in 30s for improved endurance. (progressing, not met)          Plan   Plan of care Certification: 11/2/20- 12/4/20     Outpatient Physical Therapy 2 times weekly for 4 weeks to include the following interventions: Gait Training, Manual Therapy, Moist Heat/ Ice, Neuromuscular Re-ed, Orthotic Management and Training, Patient Education, Self Care, Therapeutic Activites, Therapeutic Exercise and modalities as appropriate.     .      Cristina Meadows, PT

## 2020-11-12 ENCOUNTER — TELEPHONE (OUTPATIENT)
Dept: OPTOMETRY | Facility: CLINIC | Age: 85
End: 2020-11-12

## 2020-11-12 NOTE — TELEPHONE ENCOUNTER
----- Message from Reymundo Powell sent at 11/12/2020  2:54 PM CST -----  Regarding: Schedule appt  Contact: Pt  Patient needs an appt asap. Patient states her left eye is red and irritated at night and clears up during the day.  This has been a persistent issue for a few weeks now.    Pt call back # [phone]

## 2020-11-16 ENCOUNTER — CLINICAL SUPPORT (OUTPATIENT)
Dept: REHABILITATION | Facility: HOSPITAL | Age: 85
End: 2020-11-16
Attending: INTERNAL MEDICINE
Payer: MEDICARE

## 2020-11-16 DIAGNOSIS — M25.662 DECREASED RANGE OF MOTION (ROM) OF LEFT KNEE: ICD-10-CM

## 2020-11-16 DIAGNOSIS — M25.562 ACUTE PAIN OF LEFT KNEE: ICD-10-CM

## 2020-11-16 DIAGNOSIS — R53.1 DECREASED STRENGTH: ICD-10-CM

## 2020-11-16 DIAGNOSIS — Z74.09 IMPAIRED FUNCTIONAL MOBILITY, BALANCE, GAIT, AND ENDURANCE: ICD-10-CM

## 2020-11-16 PROCEDURE — 97110 THERAPEUTIC EXERCISES: CPT | Mod: PO

## 2020-11-16 NOTE — PROGRESS NOTES
"    Physical Therapy Treatment Note     Name: Gerda Lai  Clinic Number: [id number]    Therapy Diagnosis:   Encounter Diagnoses   Name Primary?    Decreased range of motion (ROM) of left knee     Impaired functional mobility, balance, gait, and endurance     Acute pain of left knee     Decreased strength      Physician: Stacy Harris NP    Visit Date: 11/16/2020    Physician Orders: PT Eval and Treat S/p left knee replacement by Dr. Pacheco. She has completed home health and is ready to proceed with outpatient PT  Medical Diagnosis from Referral: Status post knee replacement, unspecified laterality  Evaluation Date: 9/2/2020  Authorization Period Expiration: 8/27/21  Updated Plan of Care Expiration: 12/4/20  Visit # / Visits authorized: 18/20 (plus evaluation)    Time In: 12:49 PM  Time Out: 1:30 PM  Total Billable Time: 35 minutes    T    Precautions: Standard and Fall    Subjective     Pt reports: No new complaints. Feels pretty good today. Thinks the tape helped a little and her walking is more stable   She  Has been compliant with HEP  Response to previous treatment: felt better   Functional change: walking better    Pain:1-2/10  Location: bilateral  knees    Objective       Gerda received therapeutic exercises to develop strength, endurance, ROM, flexibility and posture for 34 minutes including:      Recumbent bike, level 3  6 min  Gastroc and soleus stretch on incline, 1 min each  Forward step ups, 10 reps/LE, 4" step  Lateral walking over 4" step, 10 reps B  Mini-squats with red theraband loop below knees,  x 10 reps  Hamstring stretch c/ strap, 2 x 30s/LE  SAQ, 2.5#, 2 x 10/LE      Not performed today:  Clamshell, LLE, 3 x 10 reps  L Heel slides, 10 x 10s  -2 x 10 reps each c/ 1# ankle weights:   -Hip ABD, Ext,   Seated hamstring curl, 2 x 10 reps/ LE, green band    SLR, 2x10 LLE  Standing on airex:  -2 x 10 reps of marching  - 2 x 10 reps of heel raises  -2 x 10 reps of toe raises  Seated knee " extension, 2#, 2 x 10/LE  Standing Hamstring curls, 2 x 10/LE, 2#  Standing Hip ext, 2x 10/E 2#  Hook-lying Hip ABD, blue band, 2 x 10 reps  Bridge with blue band, 2 x 10 reps     Pt educated on purpose, wear, and removal of taping technique- PT applied theraband brand tape with B fan pattern at ~10% tension over L knee to facilitate edema management. Pt verbalizes understanding of all education      Home Exercises Provided and Patient Education Provided     Education provided:   - exercise technique  Written Home Exercises Provided: Patient instructed to cont prior HEP.  Exercises were reviewed and Gerda was able to demonstrate them prior to the end of the session.  Gerda demonstrated good  understanding of the education provided.     See EMR under Patient Instructions for exercises provided 9/10/20 and 10/9/20.    Assessment     Patient tolerated session well. Pt able to progress to step-ups and mini-squats with resistance. She continues to require frequent rest breaks. Patient is making good progress towards goals. Will progress as tolerated.    Gerda is progressing well towards her goals.   Pt prognosis is Good.     Pt will continue to benefit from skilled outpatient physical therapy to address the deficits listed in the problem list box on initial evaluation, provide pt/family education and to maximize pt's level of independence in the home and community environment.     Pt's spiritual, cultural and educational needs considered and pt agreeable to plan of care and goals.     Anticipated barriers to physical therapy: advanced age    Goals:   Short Term Goals: 4 weeks   1. Pt will tolerate HEP for improved strength, functional mobility, ROM, posture, and endurance. (Met, 9/29/20)  2. Pt will report reduced L knee pain to </= 1/10 for improved functional mobility and ability to participate in functional activities/ADL's. (progressing, not met)  3. Pt will increase L knee ext AROM to 0 degrees for improved  functional mobility and gait mechanics. (progressing, not met)  4. Pt will increase L knee flexion AROM to >/= 120 degrees for improved functional mobility and gait mechanics. (Met, 10/9  5. Pt will demo >/= 4+/5 strength in BLE's for improved functional mobility, endurance, and posture. (progressing, not met)  6. Pt will report improved ability to put on shoes and socks without increase in pain/symptoms for improved functional mobility (Met, 10/9)  7. Pt will amb x >/=500 feet with Mod I and least restrictive device for improved function and community mobility (Met. 11/2/20)     Long Term Goals: 8 weeks   1. Pt will be I with updated HEP for improved functional mobility, posture, strength, and endurance. (progressing, not met)     2. Pt will demo 5/5 strength in BLE's for improved functional mobility, endurance, and posture. (progressing, not met)  3. Pt will negotiate 1 flight of stairs with Mod I and without pain/symptoms for improved functional mobility (progressing, not met)  4.      Pt will amb x >/=300  feet with Mod I and no device for improved function and community mobility (progressing, not met)  5. Pt will reduced L knee circumference measurements by 1 cm each improved functional mobility and gait mechanics. (progressing, not met)  New goals (11/2/20):  6. Pt will reduce TUG (timed up and go) time to </= 11 sec for improved safety with community ambulation and decreased falls risk. (progressing, not met)  7. Pt will perform >/=11 sit <> stands with use of armrests in 30s for improved endurance. (progressing, not met)          Plan   Plan of care Certification: 11/2/20- 12/4/20     Outpatient Physical Therapy 2 times weekly for 4 weeks to include the following interventions: Gait Training, Manual Therapy, Moist Heat/ Ice, Neuromuscular Re-ed, Orthotic Management and Training, Patient Education, Self Care, Therapeutic Activites, Therapeutic Exercise and modalities as appropriate.     .      Cristina Meadows,  PT

## 2020-11-17 ENCOUNTER — TELEPHONE (OUTPATIENT)
Dept: HEMATOLOGY/ONCOLOGY | Facility: CLINIC | Age: 85
End: 2020-11-17

## 2020-11-17 NOTE — TELEPHONE ENCOUNTER
Call made to patient to discuss sticking feeling in her breast. No answer at this time. L asking patient to please reach back out to us so we can get her in to be seen.     ----- Message from Jennifer Henley MA sent at 11/17/2020  4:25 PM CST -----  I just called this patient and she said it is her breast that is feeling like something is sticking her from the inside. Not her knee.   ----- Message -----  From: Kalin Pacheco MD  Sent: 11/17/2020   4:17 PM CST  To: Jennifer Henley MA    Sure  ----- Message -----  From: Jennifer Henley MA  Sent: 11/17/2020   3:20 PM CST  To: Kalin Pacheco MD    Would you like her to come in?  ----- Message -----  From: Doreen Jordan RN  Sent: 11/17/2020   3:14 PM CST  To: Tara RANKIN Staff    Could someone please reach out to patient to assist with this?  ----- Message -----  From: Diamante Green  Sent: 11/17/2020   3:09 PM CST  To: Corin Christopher Staff    Pt is calling needing an appt with the dr she stated she feels like something is sticking her from the inside where she had her surgery and wants the doc to see her soon because its very painful      Pt contact [phone]

## 2020-11-18 ENCOUNTER — PATIENT OUTREACH (OUTPATIENT)
Dept: ADMINISTRATIVE | Facility: OTHER | Age: 85
End: 2020-11-18

## 2020-11-18 ENCOUNTER — CLINICAL SUPPORT (OUTPATIENT)
Dept: REHABILITATION | Facility: HOSPITAL | Age: 85
End: 2020-11-18
Attending: INTERNAL MEDICINE
Payer: MEDICARE

## 2020-11-18 DIAGNOSIS — Z74.09 IMPAIRED FUNCTIONAL MOBILITY, BALANCE, GAIT, AND ENDURANCE: ICD-10-CM

## 2020-11-18 DIAGNOSIS — M25.662 DECREASED RANGE OF MOTION (ROM) OF LEFT KNEE: ICD-10-CM

## 2020-11-18 DIAGNOSIS — M25.562 ACUTE PAIN OF LEFT KNEE: ICD-10-CM

## 2020-11-18 DIAGNOSIS — R53.1 DECREASED STRENGTH: ICD-10-CM

## 2020-11-18 PROCEDURE — 97110 THERAPEUTIC EXERCISES: CPT | Mod: PO

## 2020-11-18 NOTE — PROGRESS NOTES
"    Physical Therapy Treatment Note     Name: Gerda Lai  Clinic Number: [id number]    Therapy Diagnosis:   Encounter Diagnoses   Name Primary?    Decreased range of motion (ROM) of left knee     Impaired functional mobility, balance, gait, and endurance     Acute pain of left knee     Decreased strength      Physician: Stacy Harris NP    Visit Date: 11/18/2020    Physician Orders: PT Eval and Treat S/p left knee replacement by Dr. Pacheco. She has completed home health and is ready to proceed with outpatient PT  Medical Diagnosis from Referral: Status post knee replacement, unspecified laterality  Evaluation Date: 9/2/2020  Authorization Period Expiration: 8/27/21  Updated Plan of Care Expiration: 12/4/20  Visit # / Visits authorized: 10/20 (plus evaluation)    Time In: 1:16 PM  Time Out: 2:00 PM  Total Billable Time: 35minutes        Precautions: Standard and Fall    Subjective     Pt reports: No new complaints. Feels pretty good today. She is seeing Dr. Burroughs this week for her L hip.   She  Has been compliant with HEP  Response to previous treatment: felt better   Functional change: walking better    Pain:1-2/10  Location: bilateral  knees    Objective       Gerda received therapeutic exercises to develop strength, endurance, ROM, flexibility and posture for 30 minutes including:      Recumbent bike, level 3,  6 min  Gastroc and soleus stretch on incline, 1 min each    Standing c/ red theraband loop at ankles, 2 x 10 of the following:  -Hip ABD  -Hip Ext    Seated hamstring curl, green band, 2 x 10 reps  Bridge with ball, 2 x 10 reps    Gerda received the following manual therapy techniques: Manual traction and Soft tissue Mobilization were applied to the: LLE for 5 minutes.     Roller and STM to L quad and ITB   L hip distraction      Not performed today:  Clamshell, LLE, 3 x 10 reps  L Heel slides, 10 x 10s  -2 x 10 reps each c/ 1# ankle weights:   -Hip ABD, Ext,   Forward step ups, 10 reps/LE, 4" " "step  Lateral walking over 4" step, 10 reps B  Mini-squats with red theraband loop below knees,  x 10 reps  Hamstring stretch c/ strap, 2 x 30s/LE  SAQ, 2.5#, 2 x 10/LE  SLR, 2x10 LLE  Standing on airex:  -2 x 10 reps of marching  - 2 x 10 reps of heel raises  -2 x 10 reps of toe raises  Seated knee extension, 2#, 2 x 10/LE  Standing Hamstring curls, 2 x 10/LE, 2#  Hook-lying Hip ABD, blue band, 2 x 10 reps  Bridge with blue band, 2 x 10 reps      Home Exercises Provided and Patient Education Provided     Education provided:   - exercise technique  Written Home Exercises Provided: Patient instructed to cont prior HEP.  Exercises were reviewed and Gerda was able to demonstrate them prior to the end of the session.  Gerda demonstrated good  understanding of the education provided.     See EMR under Patient Instructions for exercises provided 9/10/20 and 10/9/20.    Assessment     Patient tolerated session well. She did require increased rest breaks today, but was able to complete exercises without issue. She responds well to manual techniques. Will progress as tolerated.    Gerda is progressing well towards her goals.   Pt prognosis is Good.     Pt will continue to benefit from skilled outpatient physical therapy to address the deficits listed in the problem list box on initial evaluation, provide pt/family education and to maximize pt's level of independence in the home and community environment.     Pt's spiritual, cultural and educational needs considered and pt agreeable to plan of care and goals.     Anticipated barriers to physical therapy: advanced age    Goals:   Short Term Goals: 4 weeks   1. Pt will tolerate HEP for improved strength, functional mobility, ROM, posture, and endurance. (Met, 9/29/20)  2. Pt will report reduced L knee pain to </= 1/10 for improved functional mobility and ability to participate in functional activities/ADL's. (progressing, not met)  3. Pt will increase L knee ext AROM to 0 " degrees for improved functional mobility and gait mechanics. (progressing, not met)  4. Pt will increase L knee flexion AROM to >/= 120 degrees for improved functional mobility and gait mechanics. (Met, 10/9  5. Pt will demo >/= 4+/5 strength in BLE's for improved functional mobility, endurance, and posture. (progressing, not met)  6. Pt will report improved ability to put on shoes and socks without increase in pain/symptoms for improved functional mobility (Met, 10/9)  7. Pt will amb x >/=500 feet with Mod I and least restrictive device for improved function and community mobility (Met. 11/2/20)     Long Term Goals: 8 weeks   1. Pt will be I with updated HEP for improved functional mobility, posture, strength, and endurance. (progressing, not met)     2. Pt will demo 5/5 strength in BLE's for improved functional mobility, endurance, and posture. (progressing, not met)  3. Pt will negotiate 1 flight of stairs with Mod I and without pain/symptoms for improved functional mobility (progressing, not met)  4.      Pt will amb x >/=300  feet with Mod I and no device for improved function and community mobility (progressing, not met)  5. Pt will reduced L knee circumference measurements by 1 cm each improved functional mobility and gait mechanics. (progressing, not met)  New goals (11/2/20):  6. Pt will reduce TUG (timed up and go) time to </= 11 sec for improved safety with community ambulation and decreased falls risk. (progressing, not met)  7. Pt will perform >/=11 sit <> stands with use of armrests in 30s for improved endurance. (progressing, not met)          Plan   Plan of care Certification: 11/2/20- 12/4/20     Outpatient Physical Therapy 2 times weekly for 4 weeks to include the following interventions: Gait Training, Manual Therapy, Moist Heat/ Ice, Neuromuscular Re-ed, Orthotic Management and Training, Patient Education, Self Care, Therapeutic Activites, Therapeutic Exercise and modalities as appropriate.      .      Cristina Meadows, PT

## 2020-11-19 ENCOUNTER — PATIENT OUTREACH (OUTPATIENT)
Dept: OTHER | Facility: OTHER | Age: 85
End: 2020-11-19

## 2020-11-19 ENCOUNTER — TELEPHONE (OUTPATIENT)
Dept: HEMATOLOGY/ONCOLOGY | Facility: CLINIC | Age: 85
End: 2020-11-19

## 2020-11-19 NOTE — PROGRESS NOTES
"Digital Medicine: Clinician Follow-Up    Called Gerda Lai for Digital Medicine Hypertension follow-up due to receiving the following high BP alert: Gerda Lai submitted a reading of 182/72 at 11/19/2020  2:49 PM. BP avg 160/67.    Patient is interested in seeing Dr. Coombs for breast check-up. Complaints of "knife sticking" her in her right breast which she states is full of calcium deposits. She is questioning if she needs a mammogram or another breast exam to confirm cancer has not returned. Will send message to doctor on behalf of patient.    Patient has questions regarding how to safely celebrate the Thanksgiving holiday with family. Advised patient to wear mask, try to social distance as best she can, gather outside in the yard, suggest family open windows if inside.      The history is provided by the patient.   Follow-up reason(s): Alert received.   Care Team received high BP alert.  Gerda Lai submitted a reading of 182/72 at 11/19/2020  2:49 PM. Patient denies having signs/symptoms of hypertension with elevated reading - denies CP, SOB, severe headache or blurred vision.      Hypertension    Readings are trending up due to unknown reason why BP is trending upwards.  Patient is not experiencing signs/symptoms of hypertension.            Last 5 Patient Entered Readings                                      Current 30 Day Average: 160/67     Recent Readings 11/19/2020 11/19/2020 11/19/2020 11/19/2020 11/15/2020    SBP (mmHg) 178 178 182 182 162    DBP (mmHg) 72 72 72 72 65    Pulse 62 62 61 61 66                 Depression Screening  Did not address depression screening.    Sleep Apnea Screening    Did not address sleep apnea screening.     Medication Affordability Screening  Did not address medication affordability screening.     Medication Adherence-Medication adherence was assessed.          ASSESSMENT(S)  Patients BP average is 160/67 mmHg, which is above goal. Patient's BP goal is less " than or equal to 140/90.     Home BP readings are meeting goal 20% of the time. DBP continues to be well-controlled. SBP is uncontrolled.   - Instructed patient to take half-tablet (50 mg) of labetalol on TODAY only.  - Continue to measure for monitoring.   - Message sent to Dr. Coombs regarding breast concern.    F/u in 2 weeks.       Hypertension Plan  Hypertension Medication Change.  Additional monitoring needed.  Continue current therapy.  Continue current diet/physical activity routine.       Addressed patient questions and patient has my contact information if needed prior to next outreach. Patient verbalizes understanding.             There are no preventive care reminders to display for this patient.  There are no preventive care reminders to display for this patient.      Hypertension Medications             aliskiren (TEKTURNA) 300 MG Tab Take 1 tablet (300 mg total) by mouth once daily.    doxazosin (CARDURA) 1 MG tablet Take 1 tablet (1 mg total) by mouth once daily.    felodipine (PLENDIL) 2.5 MG Tb24 Take 1 tablet (2.5 mg total) by mouth every morning.    labetaloL (NORMODYNE) 100 MG tablet Take 1 tablet (100 mg total) by mouth every 12 (twelve) hours.

## 2020-11-19 NOTE — TELEPHONE ENCOUNTER
"----- Message from Doreen Jordan RN sent at 11/19/2020  3:46 PM CST -----  Can someone reach out to Ms Lorenz to get her in with Tierney tomorrow at 1?  ----- Message -----  From: Tierney Hernandez NP  Sent: 11/19/2020   3:43 PM CST  To: Doreen Jordan RN    Sure can. Can we put her at 1?   ----- Message -----  From: Doreen Jodran RN  Sent: 11/19/2020   3:41 PM CST  To: Tierney Hernandez NP, #    Are you able to see this patient tomorrow?   ----- Message -----  From: Ashwin Coombs MD  Sent: 11/19/2020   3:38 PM CST  To: Tierney Hernandez NP, Doreen Jordan RN    I am in [place] tomorrow. If Tierney can see her that would be great.  CT  ----- Message -----  From: Doreen Jordan RN  Sent: 11/19/2020   3:17 PM CST  To: Ashwin Coombs MD, Tierney Hernandez NP    Would you like to bring her in to be seen? Tierney might be able to see her tomorrow.   ----- Message -----  From: Monica Gutiérrez PharmD  Sent: 11/19/2020   3:09 PM CST  To: Corin Christopher Staff    Hi Dr. Coombs,    Patient is interested in seeing you for breast check-up. Complaints of "knife sticking" her in her right breast which she states is full of calcium deposits. She is questioning if she needs a mammogram or another breast exam to confirm cancer has not returned.     Thanks,  Monica Gutiérrez, PharmD   Digital Medicine Clinical Pharmacist  [phone]                "

## 2020-11-20 ENCOUNTER — OFFICE VISIT (OUTPATIENT)
Dept: HEMATOLOGY/ONCOLOGY | Facility: CLINIC | Age: 85
End: 2020-11-20
Payer: MEDICARE

## 2020-11-20 VITALS
RESPIRATION RATE: 16 BRPM | TEMPERATURE: 98 F | HEIGHT: 63 IN | HEART RATE: 59 BPM | OXYGEN SATURATION: 96 % | SYSTOLIC BLOOD PRESSURE: 151 MMHG | BODY MASS INDEX: 25.04 KG/M2 | WEIGHT: 141.31 LBS | DIASTOLIC BLOOD PRESSURE: 63 MMHG

## 2020-11-20 DIAGNOSIS — Z85.3 PERSONAL HISTORY OF BREAST CANCER: Primary | ICD-10-CM

## 2020-11-20 DIAGNOSIS — M85.80 OSTEOPENIA, UNSPECIFIED LOCATION: ICD-10-CM

## 2020-11-20 DIAGNOSIS — I10 HYPERTENSION, ESSENTIAL: Chronic | ICD-10-CM

## 2020-11-20 DIAGNOSIS — N64.4 BREAST PAIN, RIGHT: ICD-10-CM

## 2020-11-20 DIAGNOSIS — E78.5 HYPERLIPIDEMIA, UNSPECIFIED HYPERLIPIDEMIA TYPE: Chronic | ICD-10-CM

## 2020-11-20 DIAGNOSIS — I70.0 AORTIC ATHEROSCLEROSIS: Chronic | ICD-10-CM

## 2020-11-20 PROCEDURE — 99999 PR PBB SHADOW E&M-EST. PATIENT-LVL IV: CPT | Mod: PBBFAC,,, | Performed by: NURSE PRACTITIONER

## 2020-11-20 PROCEDURE — 99214 PR OFFICE/OUTPT VISIT, EST, LEVL IV, 30-39 MIN: ICD-10-PCS | Mod: S$PBB,,, | Performed by: NURSE PRACTITIONER

## 2020-11-20 PROCEDURE — 99214 OFFICE O/P EST MOD 30 MIN: CPT | Mod: S$PBB,,, | Performed by: NURSE PRACTITIONER

## 2020-11-20 PROCEDURE — 99999 PR PBB SHADOW E&M-EST. PATIENT-LVL IV: ICD-10-PCS | Mod: PBBFAC,,, | Performed by: NURSE PRACTITIONER

## 2020-11-20 PROCEDURE — 99214 OFFICE O/P EST MOD 30 MIN: CPT | Mod: PBBFAC | Performed by: NURSE PRACTITIONER

## 2020-11-20 NOTE — PROGRESS NOTES
"Subjective:       Patient ID: Gerda Lai is a 87 y.o. female.    Chief Complaint:   Follow-up  URGENT CARE VISIT DR. MATTA'S PATIENT    HPI Notes she is having stabbing sensation in the right breast that started a few days ago. Notes that it feels like it is moving around the right breast, not in one spot.   Notes that she knows she has a lot of scar tissue in the breast. Denies fevers and chills.     History: Breast cancer 28 years ago. Mammogram from July 2020 negative.     BP (!) 151/63 (BP Location: Left arm, Patient Position: Sitting, BP Method: Large (Automatic))   Pulse (!) 59   Temp 97.9 °F (36.6 °C) (Oral)   Resp 16   Ht 5' 3" (1.6 m)   Wt 64.1 kg (141 lb 5 oz)   SpO2 96%   BMI 25.03 kg/m²     Review of Systems   Constitutional: Negative for activity change, appetite change, chills, diaphoresis, fatigue, fever and unexpected weight change.   HENT: Negative for mouth sores, nosebleeds, sore throat and trouble swallowing.    Respiratory: Negative for cough and shortness of breath.    Cardiovascular: Negative for chest pain, palpitations and leg swelling.   Gastrointestinal: Negative for abdominal distention, abdominal pain, blood in stool, constipation, diarrhea, nausea and vomiting.   Genitourinary: Negative for hematuria and vaginal bleeding.   Musculoskeletal: Positive for arthralgias (in neck). Negative for back pain and myalgias.   Skin: Negative for pallor and rash.   Allergic/Immunologic: Negative for immunocompromised state.   Neurological: Negative for dizziness, weakness, light-headedness, numbness and headaches.        Tingling in the back of head   Hematological: Negative for adenopathy. Does not bruise/bleed easily.   Psychiatric/Behavioral: Negative for confusion. The patient is not nervous/anxious.        Objective:      Physical Exam  Vitals signs and nursing note reviewed.   Constitutional:       General: She is not in acute distress.     Appearance: Normal appearance. She is " well-developed.   HENT:      Head: Normocephalic.      Mouth/Throat:      Pharynx: No oropharyngeal exudate.   Eyes:      Pupils: Pupils are equal, round, and reactive to light.   Neck:      Musculoskeletal: Normal range of motion.   Cardiovascular:      Rate and Rhythm: Normal rate and regular rhythm.      Heart sounds: Normal heart sounds.   Pulmonary:      Effort: Pulmonary effort is normal.      Breath sounds: Normal breath sounds.   Chest:      Breasts:         Right: No mass, nipple discharge, skin change or tenderness.         Left: No mass, nipple discharge, skin change or tenderness.      Comments: A lumpectomy scar is seen at the 9 o'clock position on the right breast and at the 2 o'clock position on the left breast. There are no breast masses in the left breast, while there is dense fibrosis underlying her right sided lumpectomy incision.  Abdominal:      General: Bowel sounds are normal. There is no distension.      Palpations: Abdomen is soft.      Tenderness: There is no abdominal tenderness.   Skin:     General: Skin is warm and dry.      Findings: No rash.   Neurological:      Mental Status: She is alert and oriented to person, place, and time.   Psychiatric:         Behavior: Behavior normal.           Assessment:       1. Personal history of breast cancer    2. Breast pain, right    3. Hypertension, essential    4. Hyperlipidemia, unspecified hyperlipidemia type    5. Aortic atherosclerosis    6. Osteopenia, unspecified location        Plan:     1. Continue yearly mammogram and follow up with Dr. Mcgill    2. Will obtain US of right breast and follow up with results     3. Monitored today; continue current medication and follow up with PCP     4. Continue current medication and follow up with PCP    5. Continue to follow up with cardiology     6. Continue follow up with PCP    Medication List with Changes/Refills   Current Medications    ACETAMINOPHEN (TYLENOL) 650 MG TBSR    Take 1 tablet (650 mg  total) by mouth every 8 (eight) hours as needed.    ALBUTEROL (PROVENTIL/VENTOLIN HFA) 90 MCG/ACTUATION INHALER    Inhale 2 puffs into the lungs every 6 (six) hours as needed for Wheezing. Rescue    ALISKIREN (TEKTURNA) 300 MG TAB    Take 1 tablet (300 mg total) by mouth once daily.    COENZYME Q10 (CO Q-10) 100 MG CAPSULE    Take 100 mg by mouth once daily.    DOXAZOSIN (CARDURA) 1 MG TABLET    Take 1 tablet (1 mg total) by mouth once daily.    FELODIPINE (PLENDIL) 2.5 MG TB24    Take 1 tablet (2.5 mg total) by mouth every morning.    GLUCOSAMINE-CHONDROITIN 500-400 MG TABLET    Take 1 tablet by mouth once daily.     IPRATROPIUM (ATROVENT) 0.03 % NASAL SPRAY    2 sprays by Nasal route 2 (two) times daily as needed.     LABETALOL (NORMODYNE) 100 MG TABLET    Take 1 tablet (100 mg total) by mouth every 12 (twelve) hours.    LORAZEPAM (ATIVAN) 0.5 MG TABLET    TAKE 0.5-1 TABLETS (0.25-0.5 MG TOTAL) BY MOUTH EVERY 12 (TWELVE) HOURS AS NEEDED FOR ANXIETY.    MULTIVITAMIN CAPSULE    Take 1 capsule by mouth once daily.    OMEPRAZOLE (PRILOSEC OTC) 20 MG TABLET    Take 20 mg by mouth once daily.      POLYMYXIN B SULF-TRIMETHOPRIM (POLYTRIM) 10,000 UNIT- 1 MG/ML DROP    Place 1 drop into both eyes every 6 (six) hours.    PRAVASTATIN (PRAVACHOL) 40 MG TABLET    Take 1 tablet (40 mg total) by mouth once daily.    TROLAMINE SALICYLATE (ASPERCREME TOP)    Apply topically as needed.     TURMERIC ORAL    Take 500 mg by mouth once daily.      Patient is in agreement with the proposed treatment plan. All questions were answered to the patient's satisfaction. Patient knows to call clinic for any new or worsening symptoms and if anything is needed before the next clinic visit.          Tierney Hernandez, FNP-C  Hematology & Medical Oncology   [address] [place] [id number]  ph. [phone]  Fax. [phone]    Patient dicussed with collaborating physician, Dr. Coombs.

## 2020-11-23 ENCOUNTER — OFFICE VISIT (OUTPATIENT)
Dept: OPTOMETRY | Facility: CLINIC | Age: 85
End: 2020-11-23
Payer: MEDICARE

## 2020-11-23 ENCOUNTER — OFFICE VISIT (OUTPATIENT)
Dept: URGENT CARE | Facility: CLINIC | Age: 85
End: 2020-11-23
Payer: MEDICARE

## 2020-11-23 VITALS
HEART RATE: 82 BPM | TEMPERATURE: 99 F | WEIGHT: 141 LBS | BODY MASS INDEX: 24.98 KG/M2 | DIASTOLIC BLOOD PRESSURE: 77 MMHG | SYSTOLIC BLOOD PRESSURE: 198 MMHG | HEIGHT: 63 IN | RESPIRATION RATE: 16 BRPM | OXYGEN SATURATION: 95 %

## 2020-11-23 DIAGNOSIS — H04.123 DRY EYES, BILATERAL: Primary | ICD-10-CM

## 2020-11-23 DIAGNOSIS — J06.9 VIRAL URI WITH COUGH: Primary | ICD-10-CM

## 2020-11-23 DIAGNOSIS — J40 BRONCHITIS: ICD-10-CM

## 2020-11-23 DIAGNOSIS — R05.9 COUGH: ICD-10-CM

## 2020-11-23 LAB
CTP QC/QA: YES
SARS-COV-2 RDRP RESP QL NAA+PROBE: NEGATIVE

## 2020-11-23 PROCEDURE — 92012 INTRM OPH EXAM EST PATIENT: CPT | Mod: S$PBB,,, | Performed by: OPTOMETRIST

## 2020-11-23 PROCEDURE — 99999 PR PBB SHADOW E&M-EST. PATIENT-LVL III: ICD-10-PCS | Mod: PBBFAC,,, | Performed by: OPTOMETRIST

## 2020-11-23 PROCEDURE — 92012 PR EYE EXAM, EST PATIENT,INTERMED: ICD-10-PCS | Mod: S$PBB,,, | Performed by: OPTOMETRIST

## 2020-11-23 PROCEDURE — 99214 OFFICE O/P EST MOD 30 MIN: CPT | Mod: S$GLB,,, | Performed by: PHYSICIAN ASSISTANT

## 2020-11-23 PROCEDURE — 99999 PR PBB SHADOW E&M-EST. PATIENT-LVL III: CPT | Mod: PBBFAC,,, | Performed by: OPTOMETRIST

## 2020-11-23 PROCEDURE — U0002: ICD-10-PCS | Mod: QW,S$GLB,, | Performed by: PHYSICIAN ASSISTANT

## 2020-11-23 PROCEDURE — U0002 COVID-19 LAB TEST NON-CDC: HCPCS | Mod: QW,S$GLB,, | Performed by: PHYSICIAN ASSISTANT

## 2020-11-23 PROCEDURE — 99214 PR OFFICE/OUTPT VISIT, EST, LEVL IV, 30-39 MIN: ICD-10-PCS | Mod: S$GLB,,, | Performed by: PHYSICIAN ASSISTANT

## 2020-11-23 PROCEDURE — 99213 OFFICE O/P EST LOW 20 MIN: CPT | Mod: PBBFAC,PO | Performed by: OPTOMETRIST

## 2020-11-23 RX ORDER — DOXYCYCLINE 100 MG/1
100 CAPSULE ORAL 2 TIMES DAILY
Qty: 14 CAPSULE | Refills: 0 | Status: SHIPPED | OUTPATIENT
Start: 2020-11-23 | End: 2020-11-30

## 2020-11-23 NOTE — PROGRESS NOTES
HPI     Presents today for urgent care. Pt states about 2 weeks ago left eye   starting give her problems. Pt states eye would become red. Denies pain.   No Va problems  occ floaters, no flashes  systane gtts 3 or 4 times a day    Last edited by Luis Daniel Linares, OD on 11/23/2020 11:02 AM. (History)        ROS     Positive for: Eyes (cat surgery OU)    Negative for: Constitutional, Gastrointestinal, Neurological, Skin,   Genitourinary, Musculoskeletal, HENT, Endocrine, Cardiovascular,   Respiratory, Psychiatric, Allergic/Imm, Heme/Lymph    Last edited by Luis Daniel Linares, OD on 11/23/2020 11:03 AM. (History)        Assessment /Plan     For exam results, see Encounter Report.    Dry eyes, bilateral      1. Mild pco OD sp pciol ou/YAG OS  2. DAMION/rosacea hx.  Pt using ATs, but reports for past few weeks has been waking up with eyes a little red, and the redness disappears after she is up for awhile.  Suspect exposure K over night    PLAN:    1. Cont SYSTANE QID+  2. Sleep mask at night, or humidifier by bed  3. rtc as sched for full exam

## 2020-11-24 ENCOUNTER — CLINICAL SUPPORT (OUTPATIENT)
Dept: REHABILITATION | Facility: HOSPITAL | Age: 85
End: 2020-11-24
Attending: INTERNAL MEDICINE
Payer: MEDICARE

## 2020-11-24 ENCOUNTER — HOSPITAL ENCOUNTER (OUTPATIENT)
Dept: RADIOLOGY | Facility: HOSPITAL | Age: 85
Discharge: HOME | End: 2020-11-24
Attending: NURSE PRACTITIONER
Payer: MEDICARE

## 2020-11-24 DIAGNOSIS — M25.562 ACUTE PAIN OF LEFT KNEE: ICD-10-CM

## 2020-11-24 DIAGNOSIS — N64.4 BREAST PAIN, RIGHT: ICD-10-CM

## 2020-11-24 DIAGNOSIS — M25.662 DECREASED RANGE OF MOTION (ROM) OF LEFT KNEE: ICD-10-CM

## 2020-11-24 DIAGNOSIS — Z74.09 IMPAIRED FUNCTIONAL MOBILITY, BALANCE, GAIT, AND ENDURANCE: ICD-10-CM

## 2020-11-24 DIAGNOSIS — R53.1 DECREASED STRENGTH: ICD-10-CM

## 2020-11-24 PROCEDURE — 76642 ULTRASOUND BREAST LIMITED: CPT | Mod: 26,RT,, | Performed by: RADIOLOGY

## 2020-11-24 PROCEDURE — 76642 ULTRASOUND BREAST LIMITED: CPT | Mod: TC,RT

## 2020-11-24 PROCEDURE — 76642 US BREAST RIGHT LIMITED: ICD-10-PCS | Mod: 26,RT,, | Performed by: RADIOLOGY

## 2020-11-24 PROCEDURE — 97110 THERAPEUTIC EXERCISES: CPT | Mod: PO,CQ

## 2020-11-24 NOTE — PROGRESS NOTES
Physical Therapy Treatment Note     Name: Gerda Lai  Clinic Number: [id number]    Therapy Diagnosis:   Encounter Diagnoses   Name Primary?    Decreased range of motion (ROM) of left knee     Impaired functional mobility, balance, gait, and endurance     Acute pain of left knee     Decreased strength      Physician: Stacy Harris NP    Visit Date: 11/24/2020    Physician Orders: PT Eval and Treat S/p left knee replacement by Dr. Pacheco. She has completed home health and is ready to proceed with outpatient PT  Medical Diagnosis from Referral: Status post knee replacement, unspecified laterality  Evaluation Date: 9/2/2020  Authorization Period Expiration: 8/27/21  Updated Plan of Care Expiration: 12/4/20  Visit # / Visits authorized: 11/20 (plus evaluation)    Time In: 1:10 pm   Time Out: 1:45 pm   Total Billable Time: 40 minutes      Precautions: Standard and Fall    Subjective     Pt reports: she is running late today because she had another appointment an hour ago and has been rushing to get here. Pt stated she is having pain in her left hip , her knee doesn't hurt but it is feeling stiff.    She  Has been compliant with HEP  Response to previous treatment: felt better   Functional change: walking better    Pain: 0/10  Location: bilateral  knees    Objective     Gerda received therapeutic exercises to develop strength, endurance, ROM, flexibility and posture for 35 minutes including:    Recumbent bike, level 3,  6 min  Gastroc and soleus stretch on incline, 1 min each    Standing c/ red theraband loop at ankles, 2 x 10 of the following:  -Hip ABD  -Hip Ext    Seated hamstring curl, green band, 2 x 10 reps  Seated knee extension : 2 x 10 reps   Bridge with ball, 2 x 10 reps    Gerda received the following manual therapy techniques: Manual traction and Soft tissue Mobilization were applied to the: LLE for 5 minutes.     Roller and STM to L quad and ITB   L hip distraction      Not performed  "today:  Clamshell, LLE, 3 x 10 reps  L Heel slides, 10 x 10s  -2 x 10 reps each c/ 1# ankle weights:   -Hip ABD, Ext,   Forward step ups, 10 reps/LE, 4" step  Lateral walking over 4" step, 10 reps B  Mini-squats with red theraband loop below knees,  x 10 reps  Hamstring stretch c/ strap, 2 x 30s/LE  SAQ, 2.5#, 2 x 10/LE  SLR, 2x10 LLE  Standing on airex:  -2 x 10 reps of marching  - 2 x 10 reps of heel raises  -2 x 10 reps of toe raises  Seated knee extension, 2#, 2 x 10/LE  Standing Hamstring curls, 2 x 10/LE, 2#  Hook-lying Hip ABD, blue band, 2 x 10 reps  Bridge with blue band, 2 x 10 reps      Home Exercises Provided and Patient Education Provided     Education provided:   - exercise technique  Written Home Exercises Provided: Patient instructed to cont prior HEP.  Exercises were reviewed and Gerda was able to demonstrate them prior to the end of the session.  Gerda demonstrated good  understanding of the education provided.     See EMR under Patient Instructions for exercises provided 9/10/20 and 10/9/20.    Assessment     Pt was slightly limited with standing exercises today due to L hip pain and increased fatigue. She responds well to use of thera-roller and tolerated mat exercises well . Will continue to progress next per tolerance .     Gerda is progressing well towards her goals.   Pt prognosis is Good.     Pt will continue to benefit from skilled outpatient physical therapy to address the deficits listed in the problem list box on initial evaluation, provide pt/family education and to maximize pt's level of independence in the home and community environment.     Pt's spiritual, cultural and educational needs considered and pt agreeable to plan of care and goals.     Anticipated barriers to physical therapy: advanced age    Goals:   Short Term Goals: 4 weeks   1. Pt will tolerate HEP for improved strength, functional mobility, ROM, posture, and endurance. (Met, 9/29/20)  2. Pt will report reduced L knee " pain to </= 1/10 for improved functional mobility and ability to participate in functional activities/ADL's. (progressing, not met)  3. Pt will increase L knee ext AROM to 0 degrees for improved functional mobility and gait mechanics. (progressing, not met)  4. Pt will increase L knee flexion AROM to >/= 120 degrees for improved functional mobility and gait mechanics. (Met, 10/9  5. Pt will demo >/= 4+/5 strength in BLE's for improved functional mobility, endurance, and posture. (progressing, not met)  6. Pt will report improved ability to put on shoes and socks without increase in pain/symptoms for improved functional mobility (Met, 10/9)  7. Pt will amb x >/=500 feet with Mod I and least restrictive device for improved function and community mobility (Met. 11/2/20)     Long Term Goals: 8 weeks   1. Pt will be I with updated HEP for improved functional mobility, posture, strength, and endurance. (progressing, not met)     2. Pt will demo 5/5 strength in BLE's for improved functional mobility, endurance, and posture. (progressing, not met)  3. Pt will negotiate 1 flight of stairs with Mod I and without pain/symptoms for improved functional mobility (progressing, not met)  4.      Pt will amb x >/=300  feet with Mod I and no device for improved function and community mobility (progressing, not met)  5. Pt will reduced L knee circumference measurements by 1 cm each improved functional mobility and gait mechanics. (progressing, not met)  New goals (11/2/20):  6. Pt will reduce TUG (timed up and go) time to </= 11 sec for improved safety with community ambulation and decreased falls risk. (progressing, not met)  7. Pt will perform >/=11 sit <> stands with use of armrests in 30s for improved endurance. (progressing, not met)       Plan   Plan of care Certification: 11/2/20- 12/4/20     Outpatient Physical Therapy 2 times weekly for 4 weeks to include the following interventions: Gait Training, Manual Therapy, Moist Heat/  Ice, Neuromuscular Re-ed, Orthotic Management and Training, Patient Education, Self Care, Therapeutic Activites, Therapeutic Exercise and modalities as appropriate.       Alley Mcdermott, PTA

## 2020-11-24 NOTE — PATIENT INSTRUCTIONS
You have tested negative for COVID-19 today.  If you did not have any close exposure as defined below, then effective today, you can return to your normal daily activities including social distancing, wearing masks, and frequent handwashing.         A close exposure is defined as anyone who had a masked or an unmasked exposure to a known COVID -19 positive person, at less than 6 ft for more than 15 minutes.  If your exposure meets this definition, then you are required to quarantine for 14 days per the CDC.         The 14 day quarantine begins from the day you were exposed, not the day of your test.  For example, if your exposure was on a Monday, and you waited until Friday of the same week to get tested and it was negative, your 14 day quarantine begins from that Monday, not the Friday you tested negative.         If you developed symptoms since the exposure, and your test was negative today, you still have to quarantine for 14 days from the date of the exposure.         So if you meet the definition of a close exposure, A NEGATIVE TEST DOES NOT GET YOU OUT OF 14 DAYS OF QUARANTINE!    Patient Instructions   -Below are suggestions for symptomatic relief:              -Tylenol every 4 hours OR ibuprofen every 6 hours as needed for pain/fever.              -Salt water gargles to soothe throat pain.              -Chloroseptic spray also helps to numb throat pain.              -Nasal saline spray reduces inflammation and dryness.              -Warm face compresses to help with facial sinus pain/pressure.              -Vicks vapor rub at night.              -Flonase OTC or Nasacort OTC for nasal congestion.              -Simple foods like chicken noodle soup.              -Delsym helps with coughing at night              -Zyrtec/Claritin during the day & Benadryl at night may help with allergies.                If you DO NOT have Hypertension or any history of palpitations, it is ok to take over the counter Sudafed or  Mucinex D or Allegra-D or Claritin-D or Zyrtec-D.  If you do take one of the above, it is ok to combine that with plain over the counter Mucinex or Allegra or Claritin or Zyrtec. If, for example, you are taking Zyrtec -D, you can combine that with Mucinex, but not Mucinex-D.  If you are taking Mucinex-D, you can combine that with plain Allegra or Claritin or Zyrtec.   If you DO have Hypertension or palpitations, it is safe to take Coricidin HBP for relief of sinus symptoms.      Elevated Blood Pressure  Your blood pressure was elevated during your visit to the urgent care.  It was not so high that immediate care was needed but it is recommended that you monitor your blood pressure over the next week or two to make sure that it is not staying elevated.  Please have your blood pressure taken 2-3 times daily at different times of the day.  Write all of those blood pressures down and record the time that they were taken.  Keep all that information and take it with you to see your Primary Care Physician.  If your blood pressure is consistently above 140/90 you will need to follow up with your PCP more quickly    Please follow up with your Primary care provider within 2-5 days if your signs and symptoms have not resolved or worsen.     If your condition worsens or fails to improve we recommend that you receive another evaluation at the emergency room immediately or contact your primary medical clinic to discuss your concerns.   You must understand that you have received an Urgent Care treatment only and that you may be released before all of your medical problems are known or treated. You, the patient, will arrange for follow up care as instructed.     RED FLAGS/WARNING SYMPTOMS DISCUSSED WITH PATIENT THAT WOULD WARRANT EMERGENT MEDICAL ATTENTION. PATIENT VERBALIZED UNDERSTANDING.

## 2020-11-24 NOTE — PROGRESS NOTES
"Subjective:       Patient ID: Gerda Lai is a 87 y.o. female.    Vitals:  height is 5' 3" (1.6 m) and weight is 64 kg (141 lb). Her oral temperature is 98.9 °F (37.2 °C). Her blood pressure is 226/85 (abnormal) and her pulse is 82. Her respiration is 16 and oxygen saturation is 95%.     Chief Complaint: URI and COVID-19 Concerns    URI   This is a new problem. The current episode started yesterday. The problem has been unchanged. There has been no fever. Associated symptoms include congestion and coughing. Pertinent negatives include no abdominal pain, chest pain, diarrhea, dysuria, ear pain, headaches, joint pain, joint swelling, nausea, neck pain, plugged ear sensation, rash, rhinorrhea, sinus pain, sneezing, sore throat, swollen glands, vomiting or wheezing. She has tried acetaminophen for the symptoms. The treatment provided mild relief.       Constitution: Negative for chills, sweating, fatigue and fever.   HENT: Positive for congestion. Negative for ear pain, sinus pain, sinus pressure, sore throat and voice change.    Neck: Negative for neck pain and painful lymph nodes.   Cardiovascular: Negative for chest pain.   Eyes: Negative for eye redness.   Respiratory: Positive for cough and sputum production. Negative for chest tightness, bloody sputum, COPD, shortness of breath, stridor, wheezing and asthma.    Gastrointestinal: Negative for abdominal pain, nausea, vomiting and diarrhea.   Genitourinary: Negative for dysuria.   Musculoskeletal: Negative for muscle ache.   Skin: Negative for rash.   Allergic/Immunologic: Negative for seasonal allergies, asthma and sneezing.   Neurological: Negative for headaches.   Hematologic/Lymphatic: Negative for swollen lymph nodes.       Objective:      Physical Exam   Constitutional: She is oriented to person, place, and time. She appears well-developed. She is cooperative.  Non-toxic appearance. She does not appear ill. No distress.   HENT:   Head: Normocephalic and " atraumatic.   Ears:   Right Ear: Hearing, tympanic membrane, external ear and ear canal normal.   Left Ear: Hearing, tympanic membrane, external ear and ear canal normal.   Nose: Nose normal. No mucosal edema, rhinorrhea or nasal deformity. No epistaxis. Right sinus exhibits no maxillary sinus tenderness and no frontal sinus tenderness. Left sinus exhibits no maxillary sinus tenderness and no frontal sinus tenderness.   Mouth/Throat: Uvula is midline, oropharynx is clear and moist and mucous membranes are normal. No trismus in the jaw. Normal dentition. No uvula swelling. Cobblestoning present. No oropharyngeal exudate, posterior oropharyngeal edema, posterior oropharyngeal erythema or tonsillar abscesses.   Eyes: Conjunctivae and lids are normal. No scleral icterus.   Neck: Trachea normal, full passive range of motion without pain and phonation normal. Neck supple. No neck rigidity. No edema and no erythema present.   Cardiovascular: Normal rate, regular rhythm, normal heart sounds and normal pulses.   Pulmonary/Chest: Effort normal and breath sounds normal. No respiratory distress. She has no decreased breath sounds. She has no rhonchi.   Abdominal: Normal appearance.   Musculoskeletal: Normal range of motion.         General: No deformity.   Lymphadenopathy:        Head (right side): No submental, no submandibular, no tonsillar, no preauricular and no posterior auricular adenopathy present.        Head (left side): No submental, no submandibular, no tonsillar, no preauricular and no posterior auricular adenopathy present.     She has no cervical adenopathy.        Right cervical: No superficial cervical and no deep cervical adenopathy present.       Left cervical: No superficial cervical and no deep cervical adenopathy present.   Neurological: She is alert and oriented to person, place, and time. She exhibits normal muscle tone. Coordination normal.   Skin: Skin is warm, dry, intact, not diaphoretic and not pale.  Psychiatric: Her speech is normal and behavior is normal. Judgment and thought content normal.   Nursing note and vitals reviewed.        Assessment:       1. Viral URI with cough    2. Cough    3. Bronchitis        Plan:         Viral URI with cough    Cough  -     POCT COVID-19 Rapid Screening    Bronchitis    Other orders  -     doxycycline (VIBRAMYCIN) 100 MG Cap; Take 1 capsule (100 mg total) by mouth 2 (two) times daily. for 7 days  Dispense: 14 capsule; Refill: 0     Reviewed lab results with patient. Discussed diagnosis with patient as well as treatment and home care.  Wait and see antibiotics given a patient with specific instructions on when to take antibiotics if needed.  Discussed return to clinic precautions vs ER precautions. All patients questions answered. Patient verbalized understanding. Patient agreed with plan of care.       You have tested negative for COVID-19 today.  If you did not have any close exposure as defined below, then effective today, you can return to your normal daily activities including social distancing, wearing masks, and frequent handwashing.         A close exposure is defined as anyone who had a masked or an unmasked exposure to a known COVID -19 positive person, at less than 6 ft for more than 15 minutes.  If your exposure meets this definition, then you are required to quarantine for 14 days per the CDC.         The 14 day quarantine begins from the day you were exposed, not the day of your test.  For example, if your exposure was on a Monday, and you waited until Friday of the same week to get tested and it was negative, your 14 day quarantine begins from that Monday, not the Friday you tested negative.         If you developed symptoms since the exposure, and your test was negative today, you still have to quarantine for 14 days from the date of the exposure.         So if you meet the definition of a close exposure, A NEGATIVE TEST DOES NOT GET YOU OUT OF 14 DAYS OF  QUARANTINE!    Patient Instructions   -Below are suggestions for symptomatic relief:              -Tylenol every 4 hours OR ibuprofen every 6 hours as needed for pain/fever.              -Salt water gargles to soothe throat pain.              -Chloroseptic spray also helps to numb throat pain.              -Nasal saline spray reduces inflammation and dryness.              -Warm face compresses to help with facial sinus pain/pressure.              -Vicks vapor rub at night.              -Flonase OTC or Nasacort OTC for nasal congestion.              -Simple foods like chicken noodle soup.              -Delsym helps with coughing at night              -Zyrtec/Claritin during the day & Benadryl at night may help with allergies.                If you DO NOT have Hypertension or any history of palpitations, it is ok to take over the counter Sudafed or Mucinex D or Allegra-D or Claritin-D or Zyrtec-D.  If you do take one of the above, it is ok to combine that with plain over the counter Mucinex or Allegra or Claritin or Zyrtec. If, for example, you are taking Zyrtec -D, you can combine that with Mucinex, but not Mucinex-D.  If you are taking Mucinex-D, you can combine that with plain Allegra or Claritin or Zyrtec.   If you DO have Hypertension or palpitations, it is safe to take Coricidin HBP for relief of sinus symptoms.      Elevated Blood Pressure  Your blood pressure was elevated during your visit to the urgent care.  It was not so high that immediate care was needed but it is recommended that you monitor your blood pressure over the next week or two to make sure that it is not staying elevated.  Please have your blood pressure taken 2-3 times daily at different times of the day.  Write all of those blood pressures down and record the time that they were taken.  Keep all that information and take it with you to see your Primary Care Physician.  If your blood pressure is consistently above 140/90 you will need to  follow up with your PCP more quickly    Please follow up with your Primary care provider within 2-5 days if your signs and symptoms have not resolved or worsen.     If your condition worsens or fails to improve we recommend that you receive another evaluation at the emergency room immediately or contact your primary medical clinic to discuss your concerns.   You must understand that you have received an Urgent Care treatment only and that you may be released before all of your medical problems are known or treated. You, the patient, will arrange for follow up care as instructed.     RED FLAGS/WARNING SYMPTOMS DISCUSSED WITH PATIENT THAT WOULD WARRANT EMERGENT MEDICAL ATTENTION. PATIENT VERBALIZED UNDERSTANDING.

## 2020-11-25 ENCOUNTER — OFFICE VISIT (OUTPATIENT)
Dept: RHEUMATOLOGY | Facility: CLINIC | Age: 85
End: 2020-11-25
Payer: MEDICARE

## 2020-11-25 VITALS
DIASTOLIC BLOOD PRESSURE: 64 MMHG | BODY MASS INDEX: 24.07 KG/M2 | SYSTOLIC BLOOD PRESSURE: 128 MMHG | HEART RATE: 58 BPM | WEIGHT: 141 LBS | HEIGHT: 64 IN

## 2020-11-25 DIAGNOSIS — M70.62 TROCHANTERIC BURSITIS, LEFT HIP: Primary | ICD-10-CM

## 2020-11-25 DIAGNOSIS — M75.101 ROTATOR CUFF TEAR ARTHROPATHY OF BOTH SHOULDERS: ICD-10-CM

## 2020-11-25 DIAGNOSIS — M85.80 OSTEOPENIA, UNSPECIFIED LOCATION: ICD-10-CM

## 2020-11-25 DIAGNOSIS — M67.952 TENDINOPATHY OF LEFT GLUTEAL REGION: ICD-10-CM

## 2020-11-25 DIAGNOSIS — M12.812 ROTATOR CUFF TEAR ARTHROPATHY OF BOTH SHOULDERS: ICD-10-CM

## 2020-11-25 DIAGNOSIS — Z78.0 MENOPAUSE: ICD-10-CM

## 2020-11-25 DIAGNOSIS — M75.102 ROTATOR CUFF TEAR ARTHROPATHY OF BOTH SHOULDERS: ICD-10-CM

## 2020-11-25 DIAGNOSIS — M47.812 CERVICAL SPONDYLOSIS: ICD-10-CM

## 2020-11-25 DIAGNOSIS — M12.811 ROTATOR CUFF TEAR ARTHROPATHY OF BOTH SHOULDERS: ICD-10-CM

## 2020-11-25 PROCEDURE — 99212 OFFICE O/P EST SF 10 MIN: CPT | Mod: 25,S$PBB,, | Performed by: INTERNAL MEDICINE

## 2020-11-25 PROCEDURE — 99212 PR OFFICE/OUTPT VISIT, EST, LEVL II, 10-19 MIN: ICD-10-PCS | Mod: 25,S$PBB,, | Performed by: INTERNAL MEDICINE

## 2020-11-25 PROCEDURE — 20610 DRAIN/INJ JOINT/BURSA W/O US: CPT | Mod: PBBFAC | Performed by: INTERNAL MEDICINE

## 2020-11-25 PROCEDURE — 99214 OFFICE O/P EST MOD 30 MIN: CPT | Mod: PBBFAC,25 | Performed by: INTERNAL MEDICINE

## 2020-11-25 PROCEDURE — 99999 PR PBB SHADOW E&M-EST. PATIENT-LVL IV: CPT | Mod: PBBFAC,,, | Performed by: INTERNAL MEDICINE

## 2020-11-25 PROCEDURE — 20610 LARGE JOINT ASPIRATION/INJECTION: L GREATER TROCHANTERIC BURSA: ICD-10-PCS | Mod: S$PBB,LT,, | Performed by: INTERNAL MEDICINE

## 2020-11-25 PROCEDURE — 99999 PR PBB SHADOW E&M-EST. PATIENT-LVL IV: ICD-10-PCS | Mod: PBBFAC,,, | Performed by: INTERNAL MEDICINE

## 2020-11-25 RX ORDER — CHOLECALCIFEROL (VITAMIN D3) 25 MCG
1000 TABLET ORAL DAILY
COMMUNITY

## 2020-11-25 RX ADMIN — TRIAMCINOLONE ACETONIDE 40 MG: 40 INJECTION, SUSPENSION INTRA-ARTICULAR; INTRAMUSCULAR at 11:11

## 2020-11-25 NOTE — PROGRESS NOTES
Answers for HPI/ROS submitted by the patient on 11/25/2020   fever: No  eye redness: No  mouth sores: No  headaches: No  shortness of breath: No  chest pain: No  trouble swallowing: No  diarrhea: No  constipation: No  unexpected weight change: No  genital sore: No  dysuria: No  During the last 3 days, have you had a skin rash?: No  Bruises or bleeds easily: No  cough: Yes

## 2020-11-25 NOTE — PROCEDURES
Large Joint Aspiration/Injection: L greater trochanteric bursa    Date/Time: 11/25/2020 11:00 AM  Performed by: Cesar Burroughs MD  Authorized by: Cesar Burroughs MD     Consent Done?:  Yes (Verbal)  Indications:  Pain  Site marked: the procedure site was marked    Timeout: prior to procedure the correct patient, procedure, and site was verified    Prep: patient was prepped and draped in usual sterile fashion    Local anesthetic:  Lidocaine 1% without epinephrine and topical anesthetic  Anesthetic total (ml):  1      Details:  Needle Size:  25 G  Ultrasonic Guidance for needle placement?: No    Approach:  Lateral  Location:  Hip  Site:  L greater trochanteric bursa  Medications:  40 mg triamcinolone acetonide 40 mg/mL  Aspirate amount (mL):  0  Patient tolerance:  Patient tolerated the procedure well with no immediate complications

## 2020-11-25 NOTE — PROGRESS NOTES
Subjective:       Patient ID: Gerda Lai is a 87 y.o. female.    Chief Complaint: Gen OA (knees right>left), rotator cuff tear arthropathy right> left, s/p right ELZBIETA, mild OA left hip     HPI 88 yo female here for six month follow-up. Last seen at the end of May 2020. She was given physical therapy orders for her cervical neck, BL shoulders and knee pains. She has completed one course of PT back after being seen in May and she states she got good relief in the respective locations. She is now receiving PT for her left knee s/p TKR on 8/10/20. Today, she is having morning pains in her LB and left hip. The pain is worst when she wakes up and will alleviate after that, although still present. She also endorses lateral hip pain that transmits down her thigh and lateral leg. The left thigh is the worst pain. Denies N/T on the LLE. Denies right-sided similar symptoms. No recent injuries to her LB or left hip. Denies popping, clicking, grinding in the left hip as well. She is having difficulty walking without pain, some difficulty manuevering in the shower while she showers. She notices increased pain walking without a cane. Denies sleep issues, no increased sleep latency and no waking from sleep. Wanting injection to her left hip GTB today. Kept forgetting to take the alendronate due to other medications she takes every day, so she stopped taking it >2 months ago.    Review of Systems   Constitutional: Negative for appetite change, fatigue, fever and unexpected weight change.   HENT: Negative for mouth sores and trouble swallowing.         Dry mouth  Hair loss   Eyes: Negative for redness and visual disturbance.   Respiratory: Negative for cough, shortness of breath and wheezing.    Cardiovascular: Negative for chest pain and palpitations.   Gastrointestinal: Negative for abdominal pain, anal bleeding, blood in stool, constipation, diarrhea, nausea and vomiting.   Genitourinary: Negative for dysuria, frequency,  "genital sores and urgency.   Musculoskeletal: Negative for arthralgias, back pain, gait problem, joint swelling, myalgias, neck pain and neck stiffness.   Skin: Negative for rash.   Neurological: Negative for weakness, numbness and headaches.   Hematological: Negative for adenopathy. Bruises/bleeds easily.   Psychiatric/Behavioral: Negative for sleep disturbance. The patient is not nervous/anxious.          Objective:   /64   Pulse (!) 58   Ht 5' 3.6" (1.615 m)   Wt 64 kg (141 lb)   BMI 24.51 kg/m²      Physical Exam       Right Side Rheumatological Exam     The patient is tender to palpation of the shoulder    The patient has an enlarged shoulder, knee, 1st PIP, 2nd PIP, 3rd PIP, 4th PIP and 5th PIP    Muscle Strength (0-5 scale):  Deltoid:  4  Biceps: 5/5   Triceps:  5  : 4.8/5   Iliopsoas: 4.6  Quadriceps:  5   Distal Lower Extremity: 5    Left Side Rheumatological Exam     The patient is tender to palpation of the shoulder.    The patient has an enlarged shoulder, knee, 1st PIP, 2nd PIP, 3rd PIP, 4th PIP and 5th PIP.    Muscle Strength (0-5 scale):  Deltoid:  4.8  Biceps: 5/5   Triceps:  5  :  4.8/5   Iliopsoas: 5  Quadriceps:  5   Distal Lower Extremity: 5      Back/Neck Exam     Comments:  Lumbar spine ROM  TTP over the lateral glute muscles only, no paraspinal or SIJ, piriformis tenderness over greater sciatic notch  Flexion 80  Extension 20; mild pain with extension  SLT negative  PAIGE, FADIR negative  Exquisitely tender over the left GT, along the left IT band down the lateral leg; no tenderness in the anterior quads      Musculoskeletal: Edema (1+) present.           Assessment/Plan         Trochanteric bursitis, left hip    Menopause  -     DXA Bone Density Spine And Hip; Future; Expected date: 11/25/2020    Cervical spondylosis    Rotator cuff tear arthropathy of both shoulders    Osteopenia, unspecified location    Tendinopathy of left gluteal region    PLAN:   - She has multiple pain " generators, including the cervical spine with mild localized paresthesia, the BL shoulders with BL rotator cuff tendonopathy and chronic tearing, and today, the general lumbar spine, left hip and thigh  - The left hip and thigh are bothering her the mos today; symptoms, exam findings point to greater trochanteric bursitis > gluteal tendonopathy as the primary diagnosis today, given exquisite tenderness over the left GTB and along the ITB  - Strength and sensation preserved BLE  - Corticosteroid injection today to the left greater trochanteric bursa; please see separate procedure note for today  - She has seen orthopedics and been told she is not a surgical candidate for RC repair; she has also not seen pain medicine for any prior cervical spine injections; does not think her pain level is high enough to warrant referral at this time  - She is undergoing formal therapy for her left knee s/p TKR; will have her complete additional course of PT targeting the cervical spine, lumbar spine and hip muscles to increase strength and functional ROM as she has had success in past courses  - Physical therapy script given today  - Order put in today for DEXA scan    Problem List Items Addressed This Visit        Neuro    Cervical spondylosis       Orthopedic    Trochanteric bursitis, left hip - Primary    Osteopenia    Overview     Aretha Snell MD 2/7/2019       Narrative     EXAMINATION:  DEXA BONE DENSITY SPINE HIP    CLINICAL HISTORY:  suspected osteoporosis; Asymptomatic menopausal state85 y/o female with no history of fractures.  She had a hysterectomy at 39 y/o.  She is taking 400 units of Vit D supplements.  She has a history of Breast Ca.  Pt had a right hip replacement.  She exercises daily and does not smoke.    TECHNIQUE:  DXA specification: [place] [id number].    Bone Mineral Density scanning was performed over the hip and lumbar spine. Review of the images confirms satisfactory positioning  and technique.    COMPARISON:  Comparison study done on 04/21/2014. Lumbar spine BMD 1.478 g/cm2 and the T-score 3.9.  The Total Hip BMD 0.882 g/cm2 and the T-score -0.5.    FINDINGS:  Lumbar Spine: Lumbar bone mineral density L1-L4 is 1.502g/cm2, which is a T-score of 4.1. The Z-score is 7.0.    Total Hip: Total hip bone mineral density is 0.883g/cm2.  The T-score is -0.5, and the Z-score is 1.9.    Femoral neck: Bone mineral density is 0.711g/cm2 and the T-score is -1.2 and the Z-score is 1.3 g/cm2.    There is a 12% risk of a major osteoporotic fracture and a 3.1% risk of hip fracture in the next 10 years (FRAX).    Compared with previous DXA, BMD at the lumbar spine has increased by 1.6%, and the BMD at the total hip has remained stable.      Impression       Osteopenia of the femoral neck.  FRAX calculations do not suggest treatment.    RECOMMENDATIONS of [facility]:    1.  Calcium 9407-0941 mg daily and vitamin D 800-1000 units daily, adequate exercise.    2.  Consider x-ray of lumbar spine to evaluate very elevated Z-score of 7 and to rule out fracture at L2 which is more dense than the other vertebral bodies    3.  Repeat BMD in 2 years     Results for JOSÉ MIGUEL CASTILLO (MRN [id number]) as of 5/20/2019 10:59   Ref. Range 2/11/2019 16:47   TSH Latest Ref Range: 0.400 - 4.000 uIU/mL 0.940   Results for JOSÉ MIGUEL CASTILLO (MRN [id number]) as of 5/20/2019 10:59   Ref. Range 2/11/2019 16:47   PTH Latest Ref Range: 9.0 - 77.0 pg/mL 46.0   Results for JOSÉ MIGUEL CASTILLO (MRN [id number]) as of 5/20/2019 10:59   Ref. Range 2/11/2019 16:47   Vit D, 25-Hydroxy Latest Ref Range: 30 - 96 ng/mL 30   Results for JOSÉ MIGUEL CASTILLO (MRN [id number]) as of 5/20/2019 10:59   Ref. Range 4/4/2019 10:10   Magnesium Latest Ref Range: 1.6 - 2.6 mg/dL 1.6   Results for JOSÉ MIGUEL CASTILLO (MRN [id number]) as of 5/20/2019 10:59   Ref. Range 2/11/2019 16:47   Phosphorus Latest Ref Range: 2.7 - 4.5 mg/dL 4.3      3mo  ago  (2/13/19) 3mo ago  (2/13/19)    Urine Volume mL 650  650    Urine Collection Duration Hr 24  24    Calcium, Urine 0.0 - 15.0 mg/dL 2.3     Calcium, 24H Urine 4 - 12 mg/Hr 1Low      CA Urine (mg/Spec) mg/Spec 15     Resulting Agency  [facility]      Pathologist Interpretation HEIDI   Pathologist Interpretation HEIDI   Collected: 02/11/19 1647   Resulting lab: [facility]   Value: REVIEWED   Comment: Electronically reviewed and signed by:   Susi Tijerina MD   Signed on 02/12/19 at 14:07   No monoclonal peaks identified.      02/13/19 1058    Resulting lab: [facility]   Value: REVIEWED   Comment: Electronically reviewed and signed by:   Susi Tijerina MD   Signed on 02/14/19 at 16:10   No monoclonal peaks identified.                 Other Visit Diagnoses     Menopause        Relevant Orders    DXA Bone Density Spine And Hip    Rotator cuff tear arthropathy of both shoulders        Tendinopathy of left gluteal region

## 2020-11-25 NOTE — PROGRESS NOTES
I have personally taken the history and examined the patient and agree with the resident,s note as stated above     Left gluteal tendinopathy  Generalized OA: knees R>L; rotator cuff tear arthropathy R>L; s/p R ELZBIETA, mild OA L hip  Osteopenia on DXA 1/31/19 with FRAX major 12%  Hip 3.1%  /64    * After verbal consent and cleansing with Chloraprep the left gr. Trochanteric bursa injected with Kenalog 40mg with 1 ml 1 % lidocaine. Patient tolerated procedure well.  Add orders for neck, shoulders, left hip to current post op TKA  DXA > 1/31/21  Alendronate 35mg po once a week  Diclofenac gel 1% prn    RTC 6 months        Answers for HPI/ROS submitted by the patient on 11/25/2020   fever: No  eye redness: No  mouth sores: No  headaches: No  shortness of breath: No  chest pain: No  trouble swallowing: No  diarrhea: No  constipation: No  unexpected weight change: No  genital sore: No  dysuria: No  During the last 3 days, have you had a skin rash?: No  Bruises or bleeds easily: No  cough: Yes

## 2020-11-30 PROCEDURE — 99457 PR MONITORING, PHYSIOL PARAM, REMOTE, 1ST 20 MINS, PER MONTH: ICD-10-PCS | Mod: S$PBB,,, | Performed by: INTERNAL MEDICINE

## 2020-11-30 PROCEDURE — 99457 RPM TX MGMT 1ST 20 MIN: CPT | Mod: S$PBB,,, | Performed by: INTERNAL MEDICINE

## 2020-11-30 RX ORDER — TRIAMCINOLONE ACETONIDE 40 MG/ML
40 INJECTION, SUSPENSION INTRA-ARTICULAR; INTRAMUSCULAR
Status: DISCONTINUED | OUTPATIENT
Start: 2020-11-25 | End: 2020-11-30 | Stop reason: HOSPADM

## 2020-12-02 ENCOUNTER — CLINICAL SUPPORT (OUTPATIENT)
Dept: REHABILITATION | Facility: HOSPITAL | Age: 85
End: 2020-12-02
Attending: INTERNAL MEDICINE
Payer: MEDICARE

## 2020-12-02 ENCOUNTER — OFFICE VISIT (OUTPATIENT)
Dept: URGENT CARE | Facility: CLINIC | Age: 85
End: 2020-12-02
Payer: MEDICARE

## 2020-12-02 VITALS
OXYGEN SATURATION: 98 % | DIASTOLIC BLOOD PRESSURE: 65 MMHG | WEIGHT: 141 LBS | TEMPERATURE: 97 F | BODY MASS INDEX: 24.07 KG/M2 | HEIGHT: 64 IN | RESPIRATION RATE: 19 BRPM | SYSTOLIC BLOOD PRESSURE: 157 MMHG | HEART RATE: 60 BPM

## 2020-12-02 DIAGNOSIS — R53.1 DECREASED STRENGTH: ICD-10-CM

## 2020-12-02 DIAGNOSIS — Z74.09 IMPAIRED FUNCTIONAL MOBILITY, BALANCE, GAIT, AND ENDURANCE: ICD-10-CM

## 2020-12-02 DIAGNOSIS — M12.811 ROTATOR CUFF TEAR ARTHROPATHY OF BOTH SHOULDERS: ICD-10-CM

## 2020-12-02 DIAGNOSIS — J06.9 VIRAL URI WITH COUGH: Primary | ICD-10-CM

## 2020-12-02 DIAGNOSIS — M75.102 ROTATOR CUFF TEAR ARTHROPATHY OF BOTH SHOULDERS: ICD-10-CM

## 2020-12-02 DIAGNOSIS — M67.952 TENDINOPATHY OF LEFT GLUTEAL REGION: ICD-10-CM

## 2020-12-02 DIAGNOSIS — M47.812 CERVICAL SPONDYLOSIS: ICD-10-CM

## 2020-12-02 DIAGNOSIS — M25.562 ACUTE PAIN OF LEFT KNEE: ICD-10-CM

## 2020-12-02 DIAGNOSIS — M12.812 ROTATOR CUFF TEAR ARTHROPATHY OF BOTH SHOULDERS: ICD-10-CM

## 2020-12-02 DIAGNOSIS — M75.101 ROTATOR CUFF TEAR ARTHROPATHY OF BOTH SHOULDERS: ICD-10-CM

## 2020-12-02 DIAGNOSIS — M25.662 DECREASED RANGE OF MOTION (ROM) OF LEFT KNEE: ICD-10-CM

## 2020-12-02 LAB
CTP QC/QA: YES
SARS-COV-2 RDRP RESP QL NAA+PROBE: NEGATIVE

## 2020-12-02 PROCEDURE — U0002 COVID-19 LAB TEST NON-CDC: HCPCS | Mod: QW,S$GLB,, | Performed by: STUDENT IN AN ORGANIZED HEALTH CARE EDUCATION/TRAINING PROGRAM

## 2020-12-02 PROCEDURE — U0002: ICD-10-PCS | Mod: QW,S$GLB,, | Performed by: STUDENT IN AN ORGANIZED HEALTH CARE EDUCATION/TRAINING PROGRAM

## 2020-12-02 PROCEDURE — 97750 PHYSICAL PERFORMANCE TEST: CPT | Mod: KX,PO,59

## 2020-12-02 PROCEDURE — 99214 PR OFFICE/OUTPT VISIT, EST, LEVL IV, 30-39 MIN: ICD-10-PCS | Mod: S$GLB,,, | Performed by: STUDENT IN AN ORGANIZED HEALTH CARE EDUCATION/TRAINING PROGRAM

## 2020-12-02 PROCEDURE — 99214 OFFICE O/P EST MOD 30 MIN: CPT | Mod: S$GLB,,, | Performed by: STUDENT IN AN ORGANIZED HEALTH CARE EDUCATION/TRAINING PROGRAM

## 2020-12-02 PROCEDURE — 97140 MANUAL THERAPY 1/> REGIONS: CPT | Mod: KX,PO

## 2020-12-02 RX ORDER — IPRATROPIUM BROMIDE 42 UG/1
2 SPRAY, METERED NASAL 3 TIMES DAILY
Qty: 15 ML | Refills: 0 | Status: SHIPPED | OUTPATIENT
Start: 2020-12-02 | End: 2021-03-02

## 2020-12-02 NOTE — PLAN OF CARE
Outpatient Therapy Updated Plan of Care     Visit Date: 12/2/2020  Name: Gerda Lai  Clinic Number: [id number]    Therapy Diagnosis:   Encounter Diagnoses   Name Primary?    Cervical spondylosis     Rotator cuff tear arthropathy of both shoulders     Tendinopathy of left gluteal region     Decreased range of motion (ROM) of left knee     Impaired functional mobility, balance, gait, and endurance     Acute pain of left knee     Decreased strength      Physician: Stacy Harris, NP    Physician Orders: PT Eval and Treat S/p left knee replacement by Dr. Pacheco. She has completed home health and is ready to proceed with outpatient PT  Medical Diagnosis from Referral: Status post knee replacement, unspecified laterality  Evaluation Date: 9/2/2020    Total Visits Received: 22  Cancelled Visits: unknown  No Show Visits: >/=1    Current Certification Period: 10/30/20 to 12/4/20  Precautions:  Standard, Fall  Visits from Evaluation Date:  21    Subjective     Update: Has been fighting a virus, so she doesn't feel up to much today. She tested negative for COVID-19. Pt endorses 1/10 pain in L knee. States she feels like she's been walking a little better.    Objective     Update: Range of Motion:   Knee Left active Right Active   Flexion 130 122   Extension -1 0               Lower Extremity Strength (Seated)  Right LE   Left LE     Knee extension: 5/5 Knee extension: 5/5   Knee flexion: 5/5 Knee flexion: 5/5   Hip flexion: 5/5 Hip flexion: 5/5   Hip extension:  >/=4+/5 Hip extension: >/=4/5   Hip abduction: 4+/5 Hip abduction: 5/5   Hip adduction: 5/5 Hip adduction 5/5   Ankle dorsiflexion: 5/5 Ankle dorsiflexion: 5/5   Ankle plantarflexion: 5/5 Ankle plantarflexion: 5/5      Sit <> stands in 30s: 9 from gold chair with use of arms    TUG from gold chair with SPC: 14s  TUG from gold chair, no device: 16s    Girth Measurement Joint line 10 cm below 10 cm above   Left 40 cm 38.5 cm 42 cm       Assessment      Update:  Pt tolerated session fairly well, but it was limited due to pt not feeling well, stating she's been fighting some sort of virus (pt reports she tested negative for COVID). Pt anthony's improved knee ROM, reduced edema, and improved TUG time (which shows decreased falls risk and improved gait speed). She continues to have R hip ABD and L glute weakness. She needs intermittent rest breaks. She has met goals as noted below. Will benefit from continued therapy to work towards remaining goals. Extending POC to 12/31/20     Previous Short Term Goals Status:   Met partially  New Short Term Goals Status:   N/A  Long Term Goal Status:   continue per most recent plan of care      Reasons for Recertification of Therapy:   Pt making steady progress and will benefit from therapy to address remaining deficits in strength, motion, endurance, function, an gait.    Plan     Updated Certification Period: 12/2/2020 to 12/31/20  Recommended Treatment Plan: 2 times per week for 4 weeks: Gait Training, Manual Therapy, Moist Heat/ Ice, Neuromuscular Re-ed, Orthotic Management and Training, Patient Education, Self Care, Therapeutic Activites, Therapeutic Exercise and modalities and taping as appropriate  Other Recommendations: none anticipated    Cristina Meadows, PT  12/2/2020      I CERTIFY THE NEED FOR THESE SERVICES FURNISHED UNDER THIS PLAN OF TREATMENT AND WHILE UNDER MY CARE    Physician's comments:        Physician's Signature: ___________________________________________________

## 2020-12-02 NOTE — PATIENT INSTRUCTIONS
Viral Upper Respiratory Illness (Adult)  You have a viral upper respiratory illness (URI), which is another term for the common cold. This illness is contagious during the first few days. It is spread through the air by coughing and sneezing. It may also be spread by direct contact (touching the sick person and then touching your own eyes, nose, or mouth). Frequent handwashing will decrease risk of spread. Most viral illnesses go away within 7 to 10 days with rest and simple home remedies. Sometimes the illness may last for several weeks. Antibiotics will not kill a virus, and they are generally not prescribed for this condition.    Home care  · If symptoms are severe, rest at home for the first 2 to 3 days. When you resume activity, don't let yourself get too tired.  · Avoid being exposed to cigarette smoke (yours or others).  · You may use acetaminophen or ibuprofen to control pain and fever, unless another medicine was prescribed. (Note: If you have chronic liver or kidney disease, have ever had a stomach ulcer or gastrointestinal bleeding, or are taking blood-thinning medicines, talk with your healthcare provider before using these medicines.) Aspirin should never be given to anyone under 18 years of age who is ill with a viral infection or fever. It may cause severe liver or brain damage.  · Your appetite may be poor, so a light diet is fine. Avoid dehydration by drinking 6 to 8 glasses of fluids per day (water, soft drinks, juices, tea, or soup). Extra fluids will help loosen secretions in the nose and lungs.  · Over-the-counter cold medicines will not shorten the length of time youre sick, but they may be helpful for the following symptoms: cough, sore throat, and nasal and sinus congestion. (Note: Do not use decongestants if you have high blood pressure.)  Follow-up care  Follow up with your healthcare provider, or as advised.  When to seek medical advice  Call your healthcare provider right away if any  of these occur:  · Cough with lots of colored sputum (mucus)  · Severe headache; face, neck, or ear pain  · Difficulty swallowing due to throat pain  · Fever of 100.4°F (38°C)  Call 911, or get immediate medical care  Call emergency services right away if any of these occur:  · Chest pain, shortness of breath, wheezing, or difficulty breathing  · Coughing up blood  · Inability to swallow due to throat pain  Date Last Reviewed: 9/13/2015  © 4439-4758 [employer] [address] [place] [address]. All rights reserved. This information is not intended as a substitute for professional medical care. Always follow your healthcare professional's instructions.

## 2020-12-02 NOTE — PROGRESS NOTES
"Subjective:       Patient ID: Gerda Lai is a 87 y.o. female.    Vitals:  height is 5' 4" (1.626 m) and weight is 64 kg (141 lb). Her temperature is 97.2 °F (36.2 °C). Her blood pressure is 157/65 (abnormal) and her pulse is 60. Her respiration is 19 and oxygen saturation is 98%.     Chief Complaint: COVID-19 Concerns    Pt was treated 1 wk ago here.     Cough  This is a new problem. The current episode started 1 to 4 weeks ago (1.5 wk). The problem has been unchanged. The problem occurs constantly. The cough is productive of sputum. Pertinent negatives include no chest pain, chills, ear congestion, ear pain, fever, headaches, heartburn, hemoptysis, myalgias, nasal congestion, postnasal drip, rash, rhinorrhea, sore throat, shortness of breath, sweats, weight loss or wheezing. Nothing aggravates the symptoms. Treatments tried: antibiotic, inhaler, steroid  The treatment provided no relief. There is no history of asthma, bronchiectasis, bronchitis, COPD, emphysema, environmental allergies or pneumonia.     Sputum was colored when seen last week but now clear since completing antibiotic.       Constitution: Negative for chills and fever.   HENT: Negative for ear pain, postnasal drip and sore throat.    Neck: Negative for painful lymph nodes.   Cardiovascular: Negative for chest pain and leg swelling.   Eyes: Negative for double vision and blurred vision.   Respiratory: Positive for cough and sputum production (clear). Negative for bloody sputum, shortness of breath and wheezing.    Gastrointestinal: Negative for diarrhea and heartburn.   Genitourinary: Negative for dysuria, frequency, urgency and history of kidney stones.   Musculoskeletal: Negative for muscle cramps and muscle ache.   Skin: Negative for color change, pale, rash and bruising.   Allergic/Immunologic: Negative for environmental allergies and seasonal allergies.   Neurological: Negative for dizziness, light-headedness, passing out and headaches. "   Hematologic/Lymphatic: Negative for swollen lymph nodes.   Psychiatric/Behavioral: Negative for nervous/anxious, sleep disturbance and depression. The patient is not nervous/anxious.        Objective:      Physical Exam   Constitutional:  Non-toxic appearance. She does not appear ill. No distress.   HENT:   Head: Normocephalic.   Eyes: Conjunctivae are normal.   Cardiovascular: Normal rate and regular rhythm.   Pulmonary/Chest: Effort normal and breath sounds normal. No respiratory distress. She has no wheezes. She has no rhonchi. She has no rales.   Neurological: She is alert.   Nursing note and vitals reviewed.        Assessment:       1. Viral URI with cough        Plan:         Viral URI with cough  -     POCT COVID-19 Rapid Screening  -     Cancel: XR CHEST PA AND LATERAL; Future; Expected date: 12/02/2020  -     ipratropium (ATROVENT) 42 mcg (0.06 %) nasal spray; 2 sprays by Nasal route 3 (three) times daily.  Dispense: 15 mL; Refill: 0         Rapid COVID negative. Viral URI discussed. Treating with atrovent for common cold. No alarm features in hx and benign exam. Diagnosis and plan discussed with the patient as well as the expected course and duration of her  symptoms.  Use prescription and/or OTC medications as directed. She was advised to follow up with her PCP. Ok to return to [facility] sooner if needed. ED precautions were given. All questions and concerns were addressed prior to discharge. Patient verbalized understanding and was happy with the plan of care.

## 2020-12-04 DIAGNOSIS — I10 HYPERTENSION, ESSENTIAL: Chronic | ICD-10-CM

## 2020-12-04 RX ORDER — LORAZEPAM 0.5 MG/1
.25-.5 TABLET ORAL EVERY 12 HOURS PRN
Qty: 30 TABLET | Refills: 0 | Status: SHIPPED | OUTPATIENT
Start: 2020-12-04 | End: 2021-01-11

## 2020-12-08 NOTE — PROGRESS NOTES
Physical Therapy Treatment Note     Name: Gerda Lai  Clinic Number: [id number]    Therapy Diagnosis:   Encounter Diagnoses   Name Primary?    Decreased range of motion (ROM) of left knee     Impaired functional mobility, balance, gait, and endurance     Acute pain of left knee     Decreased strength      Physician: Stacy Harris NP    Visit Date: 12/9/2020    Physician Orders: PT Eval and Treat S/p left knee replacement by Dr. Pacheco. She has completed home health and is ready to proceed with outpatient PT  Medical Diagnosis from Referral: Status post knee replacement, unspecified laterality  Evaluation Date: 9/2/2020  Authorization Period Expiration: 8/27/21  Updated Plan of Care Expiration: 12/31/20  Visit # / Visits authorized: 23/20    Time In: 1:30 pm   Time Out: 2:15 pm   Total Billable Time: 35 minutes  -KX    Precautions: Standard and Fall    Subjective     Pt reports: she is feeling better. Her knee is coming along, the right knee is worse.    She has been compliant with HEP  Response to previous treatment: felt better   Functional change: walking better    Pain: 1/10  Location: left knee    Objective       Gerda received therapeutic exercises to develop strength, endurance, ROM, flexibility and posture for 40 minutes including:    Recumbent bike, level 1,  5 min  Seated knee extension : 2 x 10 reps  Hook-lying Hip ABD, blue band, 2 x 10 reps  Bridge with blue band, 2 x 10 reps  Gastroc and soleus stretch on incline, 1 min each  Standing heel raises 2 x 10 reps   Standing hip extension/abduction/SLR 2 x 10 reps       Gerda received the following manual therapy techniques: Soft tissue Mobilization were applied to the: L LE for 5 minutes.    L patellar mobs in all directions  Roller to L quad and ITB      Not performed today:  Seated hamstring curl, green band, 2 x 10 reps   Bridge with ball, 2 x 10 reps  Standing Hamstring curls, 2 x 10/LE, 2#        Home Exercises Provided and Patient  Education Provided     Education provided:   - exercise technique, progress to date  Written Home Exercises Provided: Patient instructed to cont prior HEP.  Exercises were reviewed and Gerda was able to demonstrate them prior to the end of the session.  Gerda demonstrated good  understanding of the education provided.     See EMR under Patient Instructions for exercises provided 9/10/20 and 10/9/20.    Assessment     Improved tolerance to session today and tolerated exercises well. Noted hip abductor weakness during gait with slight scissoring. Continue to improve balance and functional mobility within patient tolerance to decrease risk of falls. Patient was encouraged to be more active throughout her day to decrease tightness in her quads and hip flexors.     Gerda is progressing well towards her goals.   Pt prognosis is Good.     Pt will continue to benefit from skilled outpatient physical therapy to address the deficits listed in the problem list box on initial evaluation, provide pt/family education and to maximize pt's level of independence in the home and community environment.     Pt's spiritual, cultural and educational needs considered and pt agreeable to plan of care and goals.     Anticipated barriers to physical therapy: advanced age    Goals:   Short Term Goals: 4 weeks   1. Pt will tolerate HEP for improved strength, functional mobility, ROM, posture, and endurance. (Met, 9/29/20)  2. Pt will report reduced L knee pain to </= 1/10 for improved functional mobility and ability to participate in functional activities/ADL's. (progressing, not met)  3. Pt will increase L knee ext AROM to 0 degrees for improved functional mobility and gait mechanics. (progressing, not met)  4. Pt will increase L knee flexion AROM to >/= 120 degrees for improved functional mobility and gait mechanics. (Met, 10/9  5. Pt will demo >/= 4+/5 strength in BLE's for improved functional mobility, endurance, and posture.  (progressing, not met)  6. Pt will report improved ability to put on shoes and socks without increase in pain/symptoms for improved functional mobility (Met, 10/9)  7. Pt will amb x >/=500 feet with Mod I and least restrictive device for improved function and community mobility (Met. 11/2/20)     Long Term Goals: 8 weeks   1. Pt will be I with updated HEP for improved functional mobility, posture, strength, and endurance. (Met, 12/2/20)     2. Pt will demo 5/5 strength in BLE's for improved functional mobility, endurance, and posture. (progressing, not met)  3. Pt will negotiate 1 flight of stairs with Mod I and without pain/symptoms for improved functional mobility (progressing, not met)  4.      Pt will amb x >/=300  feet with Mod I and no device for improved function and community mobility (progressing, not met)  5. Pt will reduced L knee circumference measurements by 1 cm each improved functional mobility and gait mechanics. (met or exceeded 12/2/20)    New goals (11/2/20):  6. Pt will reduce TUG (timed up and go) time to </= 11 sec for improved safety with community ambulation and decreased falls risk. (progressing, not met)  7. Pt will perform >/=11 sit <> stands with use of armrests in 30s for improved endurance. (progressing, not met)       Plan   Plan of care Certification: 12/2/20- 12/31/20     Outpatient Physical Therapy 2 times weekly for 4 weeks to include the following interventions: Gait Training, Manual Therapy, Moist Heat/ Ice, Neuromuscular Re-ed, Orthotic Management and Training, Patient Education, Self Care, Therapeutic Activites, Therapeutic Exercise and modalities as appropriate.       Rebecca Dubose, PTA

## 2020-12-09 ENCOUNTER — CLINICAL SUPPORT (OUTPATIENT)
Dept: REHABILITATION | Facility: HOSPITAL | Age: 85
End: 2020-12-09
Attending: INTERNAL MEDICINE
Payer: MEDICARE

## 2020-12-09 DIAGNOSIS — Z74.09 IMPAIRED FUNCTIONAL MOBILITY, BALANCE, GAIT, AND ENDURANCE: ICD-10-CM

## 2020-12-09 DIAGNOSIS — M25.662 DECREASED RANGE OF MOTION (ROM) OF LEFT KNEE: ICD-10-CM

## 2020-12-09 DIAGNOSIS — R53.1 DECREASED STRENGTH: ICD-10-CM

## 2020-12-09 DIAGNOSIS — M25.562 ACUTE PAIN OF LEFT KNEE: ICD-10-CM

## 2020-12-09 PROCEDURE — 97110 THERAPEUTIC EXERCISES: CPT | Mod: PO,CQ

## 2020-12-18 ENCOUNTER — PATIENT OUTREACH (OUTPATIENT)
Dept: OTHER | Facility: OTHER | Age: 85
End: 2020-12-18

## 2020-12-18 ENCOUNTER — DOCUMENTATION ONLY (OUTPATIENT)
Dept: REHABILITATION | Facility: HOSPITAL | Age: 85
End: 2020-12-18

## 2020-12-18 ENCOUNTER — TELEPHONE (OUTPATIENT)
Dept: INTERNAL MEDICINE | Facility: CLINIC | Age: 85
End: 2020-12-18

## 2020-12-18 NOTE — TELEPHONE ENCOUNTER
----- Message from Monica Gutiérrez PharmD sent at 12/18/2020  3:03 PM CST -----  Hi Dr. eBltran,    Patient is requesting an appointment for a look over due to a recent GI issue that led to an Urgent Care visit. She would also like to be seen since having her knee surgery.    Can someone call her to get her schedule for an appointment to help put her at ease?     Thank you,  Jacquelin AguayoD   Digital Medicine Clinical Pharmacist  [phone]

## 2020-12-18 NOTE — PROGRESS NOTES
Digital Medicine: Clinician Follow-Up    Called Gerda Lai for hypertension Digital Medicine follow-up: 169/69.    Patient has compliants of post-surgery knee pains and ailments in her hips and knees. She reports continuing to have chronic pain which she is treating with Tylenol arthritis. It has worked well. She also reports family stress as well that is likely causing her BP to be higher than usual. A recent GI issue resulted in a visit to . Patient feels that issue has resolved but she would like to see her PCP for a look over to confirm that she is doing fine since that issue and her knee surgery. Message will be sent to PCP for an appointment on behalf of patient.     The history is provided by the patient.   Follow-up reason(s): medication change follow-up.     Hypertension    Readings are trending down         Last 5 Patient Entered Readings                                      Current 30 Day Average: 169/69     Recent Readings 12/13/2020 12/13/2020 12/1/2020 12/1/2020 11/19/2020    SBP (mmHg) 158 158 172 172 178    DBP (mmHg) 60 60 71 71 72    Pulse 63 63 58 58 62                 Depression Screening  Did not address depression screening.    Sleep Apnea Screening    Did not address sleep apnea screening.     Medication Affordability Screening  Did not address medication affordability screening.     Medication Adherence-Medication adherence was assessed.        Patient reports compliance to BP medications.       ASSESSMENT(S)  Patients BP average is 169/69 mmHg, which is above goal. Patient's BP goal is less than or equal to 140/90.     Home BP is still uncontrolled but trending downwards and improving. DBP remain consistently well-controlled; SBP is consistently elevated. BP likely not controlled due to chronic pain and stress. No medication changes suggested at this time due to downward trend. Continue to monitor.     F/u in 2 weeks.       Hypertension Plan  Additional monitoring needed.  Continue  current therapy.  Continue current diet/physical activity routine.  Await MD intervention.  Provided patient education.       Addressed patient questions and patient has my contact information if needed prior to next outreach. Patient verbalizes understanding.      Sent link to [facility] Digital [facility] webpages and my contact information via [employer] for future questions.               There are no preventive care reminders to display for this patient.  There are no preventive care reminders to display for this patient.      Hypertension Medications             aliskiren (TEKTURNA) 300 MG Tab Take 1 tablet (300 mg total) by mouth once daily.    doxazosin (CARDURA) 1 MG tablet Take 1 tablet (1 mg total) by mouth once daily.    felodipine (PLENDIL) 2.5 MG Tb24 Take 1 tablet (2.5 mg total) by mouth every morning.    labetaloL (NORMODYNE) 100 MG tablet Take 1 tablet (100 mg total) by mouth every 12 (twelve) hours.

## 2020-12-18 NOTE — PROGRESS NOTES
PT/PTA met face to face to discuss pt's treatment plan and progress towards established goals. Pt will be seen by a physical therapist minimally every 6th visit or every 30 days.      Rebecca Dubose PTA

## 2020-12-21 NOTE — PROGRESS NOTES
Physical Therapy Treatment Note     Name: Gerda Lai  Clinic Number: [id number]    Therapy Diagnosis:   Encounter Diagnoses   Name Primary?    Decreased range of motion (ROM) of left knee     Impaired functional mobility, balance, gait, and endurance     Acute pain of left knee     Decreased strength      Physician: Stacy Harris NP    Visit Date: 12/22/2020    Physician Orders: PT Eval and Treat S/p left knee replacement by Dr. Pacheco. She has completed home health and is ready to proceed with outpatient PT  Medical Diagnosis from Referral: Status post knee replacement, unspecified laterality  Evaluation Date: 9/2/2020  Authorization Period Expiration: 8/27/21  Updated Plan of Care Expiration: 12/31/20  Visit # / Visits authorized: 24/20    Time In: 4:00 pm  Time Out: 4:45 pm   Total Billable Time: 45 minutes     Precautions: Standard and Fall    Subjective     Pt reports: still having pain in the left knee and is considering contacting her doctor regarding this. Pt stated she also knows that she would probably benefit from doing her standing exercises more often because she feels like these help .    She has been compliant with HEP  Response to previous treatment: felt better   Functional change: walking better    Pain: 5/10  Location: left knee    Objective     Gerda received therapeutic exercises to develop strength, endurance, ROM, flexibility and posture for 40 minutes including:    Recumbent bike, level 1,  5 min  Seated knee extension : 2 x 10 reps  Hook-lying Hip ABD, blue band, 2 x 10 reps  Bridge with blue band, 2 x 10 reps  Gastroc and soleus stretch on incline, 1 min each  Standing heel raises 2 x 10 reps   Standing hip extension/abduction/SLR 2 x 10 reps     Gerda received the following manual therapy techniques: Soft tissue Mobilization were applied to the: L LE for 5 minutes.    L patellar mobs in all directions  Roller to L quad and ITB    Not performed today:  Seated hamstring curl,  green band, 2 x 10 reps   Bridge with ball, 2 x 10 reps  Standing Hamstring curls, 2 x 10/LE, 2#    Home Exercises Provided and Patient Education Provided     Education provided:   - exercise technique, progress to date  Written Home Exercises Provided: Patient instructed to cont prior HEP.  Exercises were reviewed and Gerda was able to demonstrate them prior to the end of the session.  Gerda demonstrated good  understanding of the education provided.     See EMR under Patient Instructions for exercises provided 9/10/20 and 10/9/20.    Assessment     Pt endorses concerns over her continued L knee pain although also admits she might now be moving enough during the day . Discussed small goals to get up and move more during the day (ex: getting up at commercial breaks) and progressing HEP to performing 2x a day. Pt reports decreased knee pain and stiffness following manual interventions performed . She continues to exhibit slight instability with gait as well as hip weakness and would continue to benefit from skilled PT to address deficits.    Gerda is progressing well towards her goals.   Pt prognosis is Good.     Pt will continue to benefit from skilled outpatient physical therapy to address the deficits listed in the problem list box on initial evaluation, provide pt/family education and to maximize pt's level of independence in the home and community environment.     Pt's spiritual, cultural and educational needs considered and pt agreeable to plan of care and goals.     Anticipated barriers to physical therapy: advanced age    Goals:   Short Term Goals: 4 weeks   1. Pt will tolerate HEP for improved strength, functional mobility, ROM, posture, and endurance. (Met, 9/29/20)  2. Pt will report reduced L knee pain to </= 1/10 for improved functional mobility and ability to participate in functional activities/ADL's. (progressing, not met)  3. Pt will increase L knee ext AROM to 0 degrees for improved functional  mobility and gait mechanics. (progressing, not met)  4. Pt will increase L knee flexion AROM to >/= 120 degrees for improved functional mobility and gait mechanics. (Met, 10/9  5. Pt will demo >/= 4+/5 strength in BLE's for improved functional mobility, endurance, and posture. (progressing, not met)  6. Pt will report improved ability to put on shoes and socks without increase in pain/symptoms for improved functional mobility (Met, 10/9)  7. Pt will amb x >/=500 feet with Mod I and least restrictive device for improved function and community mobility (Met. 11/2/20)     Long Term Goals: 8 weeks   1. Pt will be I with updated HEP for improved functional mobility, posture, strength, and endurance. (Met, 12/2/20)     2. Pt will demo 5/5 strength in BLE's for improved functional mobility, endurance, and posture. (progressing, not met)  3. Pt will negotiate 1 flight of stairs with Mod I and without pain/symptoms for improved functional mobility (progressing, not met)  4.      Pt will amb x >/=300  feet with Mod I and no device for improved function and community mobility (progressing, not met)  5. Pt will reduced L knee circumference measurements by 1 cm each improved functional mobility and gait mechanics. (met or exceeded 12/2/20)    New goals (11/2/20):  6. Pt will reduce TUG (timed up and go) time to </= 11 sec for improved safety with community ambulation and decreased falls risk. (progressing, not met)  7. Pt will perform >/=11 sit <> stands with use of armrests in 30s for improved endurance. (progressing, not met)       Plan   Plan of care Certification: 12/2/20- 12/31/20     Outpatient Physical Therapy 2 times weekly for 4 weeks to include the following interventions: Gait Training, Manual Therapy, Moist Heat/ Ice, Neuromuscular Re-ed, Orthotic Management and Training, Patient Education, Self Care, Therapeutic Activites, Therapeutic Exercise and modalities as appropriate.       Alley Mcdermott, PTA

## 2020-12-22 ENCOUNTER — CLINICAL SUPPORT (OUTPATIENT)
Dept: REHABILITATION | Facility: HOSPITAL | Age: 85
End: 2020-12-22
Attending: INTERNAL MEDICINE
Payer: MEDICARE

## 2020-12-22 DIAGNOSIS — M25.562 ACUTE PAIN OF LEFT KNEE: ICD-10-CM

## 2020-12-22 DIAGNOSIS — M25.662 DECREASED RANGE OF MOTION (ROM) OF LEFT KNEE: ICD-10-CM

## 2020-12-22 DIAGNOSIS — R53.1 DECREASED STRENGTH: ICD-10-CM

## 2020-12-22 DIAGNOSIS — Z74.09 IMPAIRED FUNCTIONAL MOBILITY, BALANCE, GAIT, AND ENDURANCE: ICD-10-CM

## 2020-12-22 PROCEDURE — 97110 THERAPEUTIC EXERCISES: CPT | Mod: PO,CQ

## 2020-12-31 PROCEDURE — 99457 PR MONITORING, PHYSIOL PARAM, REMOTE, 1ST 20 MINS, PER MONTH: ICD-10-PCS | Mod: S$PBB,,, | Performed by: INTERNAL MEDICINE

## 2020-12-31 PROCEDURE — 99457 RPM TX MGMT 1ST 20 MIN: CPT | Mod: S$PBB,,, | Performed by: INTERNAL MEDICINE

## 2021-01-04 ENCOUNTER — OFFICE VISIT (OUTPATIENT)
Dept: ORTHOPEDICS | Facility: CLINIC | Age: 86
End: 2021-01-04
Payer: MEDICARE

## 2021-01-04 ENCOUNTER — OFFICE VISIT (OUTPATIENT)
Dept: INTERNAL MEDICINE | Facility: CLINIC | Age: 86
End: 2021-01-04
Payer: MEDICARE

## 2021-01-04 VITALS
SYSTOLIC BLOOD PRESSURE: 120 MMHG | HEIGHT: 63 IN | HEIGHT: 63 IN | OXYGEN SATURATION: 98 % | BODY MASS INDEX: 25.35 KG/M2 | BODY MASS INDEX: 25.35 KG/M2 | DIASTOLIC BLOOD PRESSURE: 56 MMHG | HEART RATE: 56 BPM | TEMPERATURE: 98 F | WEIGHT: 143.06 LBS | WEIGHT: 143.06 LBS

## 2021-01-04 DIAGNOSIS — M25.552 LEFT HIP PAIN: Primary | ICD-10-CM

## 2021-01-04 DIAGNOSIS — G56.02 THENAR ATROPHY, LEFT: ICD-10-CM

## 2021-01-04 DIAGNOSIS — M25.562 CHRONIC PAIN OF LEFT KNEE: ICD-10-CM

## 2021-01-04 DIAGNOSIS — G56.03 BILATERAL CARPAL TUNNEL SYNDROME: Primary | ICD-10-CM

## 2021-01-04 DIAGNOSIS — G56.01 THENAR ATROPHY, RIGHT: ICD-10-CM

## 2021-01-04 DIAGNOSIS — M54.12 CERVICAL RADICULOPATHY: ICD-10-CM

## 2021-01-04 DIAGNOSIS — G56.01 CARPAL TUNNEL SYNDROME OF RIGHT WRIST: ICD-10-CM

## 2021-01-04 DIAGNOSIS — G89.29 CHRONIC PAIN OF LEFT KNEE: ICD-10-CM

## 2021-01-04 PROCEDURE — 99999 PR PBB SHADOW E&M-EST. PATIENT-LVL V: CPT | Mod: PBBFAC,,, | Performed by: INTERNAL MEDICINE

## 2021-01-04 PROCEDURE — 99999 PR PBB SHADOW E&M-EST. PATIENT-LVL IV: ICD-10-PCS | Mod: PBBFAC,,, | Performed by: ORTHOPAEDIC SURGERY

## 2021-01-04 PROCEDURE — 99213 OFFICE O/P EST LOW 20 MIN: CPT | Mod: S$PBB,,, | Performed by: ORTHOPAEDIC SURGERY

## 2021-01-04 PROCEDURE — 99214 PR OFFICE/OUTPT VISIT, EST, LEVL IV, 30-39 MIN: ICD-10-PCS | Mod: S$PBB,,, | Performed by: INTERNAL MEDICINE

## 2021-01-04 PROCEDURE — 99213 PR OFFICE/OUTPT VISIT, EST, LEVL III, 20-29 MIN: ICD-10-PCS | Mod: S$PBB,,, | Performed by: ORTHOPAEDIC SURGERY

## 2021-01-04 PROCEDURE — 99999 PR PBB SHADOW E&M-EST. PATIENT-LVL V: ICD-10-PCS | Mod: PBBFAC,,, | Performed by: INTERNAL MEDICINE

## 2021-01-04 PROCEDURE — 99215 OFFICE O/P EST HI 40 MIN: CPT | Mod: PBBFAC,PO | Performed by: INTERNAL MEDICINE

## 2021-01-04 PROCEDURE — 99214 OFFICE O/P EST MOD 30 MIN: CPT | Mod: S$PBB,,, | Performed by: INTERNAL MEDICINE

## 2021-01-04 PROCEDURE — 99999 PR PBB SHADOW E&M-EST. PATIENT-LVL IV: CPT | Mod: PBBFAC,,, | Performed by: ORTHOPAEDIC SURGERY

## 2021-01-04 PROCEDURE — 99214 OFFICE O/P EST MOD 30 MIN: CPT | Mod: PBBFAC,27,PO | Performed by: ORTHOPAEDIC SURGERY

## 2021-01-04 RX ORDER — DICLOFENAC SODIUM 10 MG/G
2 GEL TOPICAL 3 TIMES DAILY
Qty: 200 G | Refills: 1 | Status: SHIPPED | OUTPATIENT
Start: 2021-01-04 | End: 2021-03-02

## 2021-01-05 ENCOUNTER — PROCEDURE VISIT (OUTPATIENT)
Dept: PHYSICAL MEDICINE AND REHAB | Facility: CLINIC | Age: 86
End: 2021-01-05
Payer: MEDICARE

## 2021-01-05 ENCOUNTER — CLINICAL SUPPORT (OUTPATIENT)
Dept: REHABILITATION | Facility: HOSPITAL | Age: 86
End: 2021-01-05
Attending: INTERNAL MEDICINE
Payer: MEDICARE

## 2021-01-05 DIAGNOSIS — G56.03 BILATERAL CARPAL TUNNEL SYNDROME: ICD-10-CM

## 2021-01-05 DIAGNOSIS — Z74.09 IMPAIRED FUNCTIONAL MOBILITY, BALANCE, GAIT, AND ENDURANCE: ICD-10-CM

## 2021-01-05 DIAGNOSIS — M25.662 DECREASED RANGE OF MOTION (ROM) OF LEFT KNEE: ICD-10-CM

## 2021-01-05 DIAGNOSIS — M25.562 ACUTE PAIN OF LEFT KNEE: ICD-10-CM

## 2021-01-05 DIAGNOSIS — R53.1 DECREASED STRENGTH: ICD-10-CM

## 2021-01-05 PROCEDURE — 95886 MUSC TEST DONE W/N TEST COMP: CPT | Mod: 26,S$PBB,, | Performed by: PHYSICAL MEDICINE & REHABILITATION

## 2021-01-05 PROCEDURE — 95885 PR MUSC TST DONE W/NERV TST LIM: ICD-10-PCS | Mod: 26,S$PBB,59, | Performed by: PHYSICAL MEDICINE & REHABILITATION

## 2021-01-05 PROCEDURE — 95911 NRV CNDJ TEST 9-10 STUDIES: CPT | Mod: 26,S$PBB,, | Performed by: PHYSICAL MEDICINE & REHABILITATION

## 2021-01-05 PROCEDURE — 95885 MUSC TST DONE W/NERV TST LIM: CPT | Mod: 26,S$PBB,59, | Performed by: PHYSICAL MEDICINE & REHABILITATION

## 2021-01-05 PROCEDURE — 97110 THERAPEUTIC EXERCISES: CPT | Mod: PO

## 2021-01-05 PROCEDURE — 95911 NRV CNDJ TEST 9-10 STUDIES: CPT | Mod: PBBFAC | Performed by: PHYSICAL MEDICINE & REHABILITATION

## 2021-01-05 PROCEDURE — 95885 MUSC TST DONE W/NERV TST LIM: CPT | Mod: PBBFAC | Performed by: PHYSICAL MEDICINE & REHABILITATION

## 2021-01-05 PROCEDURE — 95911 PR NERVE CONDUCTION STUDY; 9-10 STUDIES: ICD-10-PCS | Mod: 26,S$PBB,, | Performed by: PHYSICAL MEDICINE & REHABILITATION

## 2021-01-05 PROCEDURE — 95886 MUSC TEST DONE W/N TEST COMP: CPT | Mod: PBBFAC | Performed by: PHYSICAL MEDICINE & REHABILITATION

## 2021-01-05 PROCEDURE — 95886 PR EMG COMPLETE, W/ NERVE CONDUCTION STUDIES, 5+ MUSCLES: ICD-10-PCS | Mod: 26,S$PBB,, | Performed by: PHYSICAL MEDICINE & REHABILITATION

## 2021-01-06 ENCOUNTER — HOSPITAL ENCOUNTER (OUTPATIENT)
Dept: RADIOLOGY | Facility: HOSPITAL | Age: 86
Discharge: HOME | End: 2021-01-06
Attending: INTERNAL MEDICINE
Payer: MEDICARE

## 2021-01-06 DIAGNOSIS — M25.552 LEFT HIP PAIN: ICD-10-CM

## 2021-01-06 PROCEDURE — 73502 XR HIP 2 VIEW LEFT: ICD-10-PCS | Mod: 26,LT,, | Performed by: RADIOLOGY

## 2021-01-06 PROCEDURE — 73502 X-RAY EXAM HIP UNI 2-3 VIEWS: CPT | Mod: 26,LT,, | Performed by: RADIOLOGY

## 2021-01-06 PROCEDURE — 73502 X-RAY EXAM HIP UNI 2-3 VIEWS: CPT | Mod: TC,PO,LT

## 2021-01-07 ENCOUNTER — CLINICAL SUPPORT (OUTPATIENT)
Dept: REHABILITATION | Facility: HOSPITAL | Age: 86
End: 2021-01-07
Attending: INTERNAL MEDICINE
Payer: MEDICARE

## 2021-01-07 DIAGNOSIS — R53.1 DECREASED STRENGTH: ICD-10-CM

## 2021-01-07 DIAGNOSIS — M25.562 ACUTE PAIN OF LEFT KNEE: ICD-10-CM

## 2021-01-07 DIAGNOSIS — Z74.09 IMPAIRED FUNCTIONAL MOBILITY, BALANCE, GAIT, AND ENDURANCE: ICD-10-CM

## 2021-01-07 DIAGNOSIS — M25.662 DECREASED RANGE OF MOTION (ROM) OF LEFT KNEE: ICD-10-CM

## 2021-01-07 PROCEDURE — 97110 THERAPEUTIC EXERCISES: CPT | Mod: PO,CQ

## 2021-01-11 DIAGNOSIS — I10 HYPERTENSION, ESSENTIAL: Chronic | ICD-10-CM

## 2021-01-11 RX ORDER — LORAZEPAM 0.5 MG/1
.25-.5 TABLET ORAL EVERY 12 HOURS PRN
Qty: 30 TABLET | Refills: 0 | Status: SHIPPED | OUTPATIENT
Start: 2021-01-11 | End: 2021-02-17

## 2021-01-12 ENCOUNTER — PATIENT OUTREACH (OUTPATIENT)
Dept: ADMINISTRATIVE | Facility: OTHER | Age: 86
End: 2021-01-12

## 2021-01-13 ENCOUNTER — OFFICE VISIT (OUTPATIENT)
Dept: ORTHOPEDICS | Facility: CLINIC | Age: 86
End: 2021-01-13
Payer: MEDICARE

## 2021-01-13 DIAGNOSIS — G56.01 THENAR ATROPHY, RIGHT: ICD-10-CM

## 2021-01-13 DIAGNOSIS — G56.03 BILATERAL CARPAL TUNNEL SYNDROME: Primary | ICD-10-CM

## 2021-01-13 PROCEDURE — 99213 OFFICE O/P EST LOW 20 MIN: CPT | Mod: S$PBB,25,, | Performed by: ORTHOPAEDIC SURGERY

## 2021-01-13 PROCEDURE — 99999 PR PBB SHADOW E&M-EST. PATIENT-LVL III: CPT | Mod: PBBFAC,,, | Performed by: ORTHOPAEDIC SURGERY

## 2021-01-13 PROCEDURE — 20526 THER INJECTION CARP TUNNEL: CPT | Mod: S$PBB,RT,, | Performed by: ORTHOPAEDIC SURGERY

## 2021-01-13 PROCEDURE — 99213 OFFICE O/P EST LOW 20 MIN: CPT | Mod: PBBFAC,PO | Performed by: ORTHOPAEDIC SURGERY

## 2021-01-13 PROCEDURE — 99213 PR OFFICE/OUTPT VISIT, EST, LEVL III, 20-29 MIN: ICD-10-PCS | Mod: S$PBB,25,, | Performed by: ORTHOPAEDIC SURGERY

## 2021-01-13 PROCEDURE — 99999 PR PBB SHADOW E&M-EST. PATIENT-LVL III: ICD-10-PCS | Mod: PBBFAC,,, | Performed by: ORTHOPAEDIC SURGERY

## 2021-01-13 PROCEDURE — 20526 PR INJECT CARPAL TUNNEL: ICD-10-PCS | Mod: S$PBB,RT,, | Performed by: ORTHOPAEDIC SURGERY

## 2021-01-13 RX ORDER — METHYLPREDNISOLONE ACETATE 40 MG/ML
40 INJECTION, SUSPENSION INTRA-ARTICULAR; INTRALESIONAL; INTRAMUSCULAR; SOFT TISSUE
Status: DISCONTINUED | OUTPATIENT
Start: 2021-01-13 | End: 2021-01-13 | Stop reason: HOSPADM

## 2021-01-13 RX ADMIN — METHYLPREDNISOLONE ACETATE 40 MG: 40 INJECTION, SUSPENSION INTRALESIONAL; INTRAMUSCULAR; INTRASYNOVIAL; SOFT TISSUE at 02:01

## 2021-01-14 ENCOUNTER — CLINICAL SUPPORT (OUTPATIENT)
Dept: REHABILITATION | Facility: HOSPITAL | Age: 86
End: 2021-01-14
Attending: INTERNAL MEDICINE
Payer: MEDICARE

## 2021-01-14 DIAGNOSIS — R53.1 DECREASED STRENGTH: ICD-10-CM

## 2021-01-14 DIAGNOSIS — M25.662 DECREASED RANGE OF MOTION (ROM) OF LEFT KNEE: ICD-10-CM

## 2021-01-14 DIAGNOSIS — M25.562 ACUTE PAIN OF LEFT KNEE: ICD-10-CM

## 2021-01-14 DIAGNOSIS — Z74.09 IMPAIRED FUNCTIONAL MOBILITY, BALANCE, GAIT, AND ENDURANCE: ICD-10-CM

## 2021-01-14 PROCEDURE — 97110 THERAPEUTIC EXERCISES: CPT | Mod: PO,CQ

## 2021-01-17 ENCOUNTER — IMMUNIZATION (OUTPATIENT)
Dept: INTERNAL MEDICINE | Facility: CLINIC | Age: 86
End: 2021-01-17
Payer: MEDICARE

## 2021-01-17 DIAGNOSIS — Z23 NEED FOR VACCINATION: Primary | ICD-10-CM

## 2021-01-17 PROCEDURE — 91300 COVID-19, MRNA, LNP-S, PF, 30 MCG/0.3 ML DOSE VACCINE: CPT | Mod: PBBFAC,PO

## 2021-01-21 ENCOUNTER — CLINICAL SUPPORT (OUTPATIENT)
Dept: REHABILITATION | Facility: HOSPITAL | Age: 86
End: 2021-01-21
Attending: INTERNAL MEDICINE
Payer: MEDICARE

## 2021-01-21 DIAGNOSIS — Z74.09 IMPAIRED FUNCTIONAL MOBILITY, BALANCE, GAIT, AND ENDURANCE: ICD-10-CM

## 2021-01-21 DIAGNOSIS — R53.1 DECREASED STRENGTH: ICD-10-CM

## 2021-01-21 DIAGNOSIS — M25.662 DECREASED RANGE OF MOTION (ROM) OF LEFT KNEE: ICD-10-CM

## 2021-01-21 DIAGNOSIS — M25.562 ACUTE PAIN OF LEFT KNEE: ICD-10-CM

## 2021-01-21 PROCEDURE — 97110 THERAPEUTIC EXERCISES: CPT | Mod: PO,CQ

## 2021-01-26 ENCOUNTER — CLINICAL SUPPORT (OUTPATIENT)
Dept: REHABILITATION | Facility: HOSPITAL | Age: 86
End: 2021-01-26
Attending: INTERNAL MEDICINE
Payer: MEDICARE

## 2021-01-26 DIAGNOSIS — M25.662 DECREASED RANGE OF MOTION (ROM) OF LEFT KNEE: ICD-10-CM

## 2021-01-26 DIAGNOSIS — Z74.09 IMPAIRED FUNCTIONAL MOBILITY, BALANCE, GAIT, AND ENDURANCE: ICD-10-CM

## 2021-01-26 DIAGNOSIS — R53.1 DECREASED STRENGTH: ICD-10-CM

## 2021-01-26 DIAGNOSIS — M25.562 ACUTE PAIN OF LEFT KNEE: ICD-10-CM

## 2021-01-26 PROCEDURE — 97110 THERAPEUTIC EXERCISES: CPT | Mod: PO,CQ

## 2021-01-29 ENCOUNTER — CLINICAL SUPPORT (OUTPATIENT)
Dept: REHABILITATION | Facility: HOSPITAL | Age: 86
End: 2021-01-29
Attending: INTERNAL MEDICINE
Payer: MEDICARE

## 2021-01-29 DIAGNOSIS — R53.1 DECREASED STRENGTH: ICD-10-CM

## 2021-01-29 DIAGNOSIS — M25.562 ACUTE PAIN OF LEFT KNEE: ICD-10-CM

## 2021-01-29 DIAGNOSIS — Z74.09 IMPAIRED FUNCTIONAL MOBILITY, BALANCE, GAIT, AND ENDURANCE: ICD-10-CM

## 2021-01-29 DIAGNOSIS — M25.662 DECREASED RANGE OF MOTION (ROM) OF LEFT KNEE: ICD-10-CM

## 2021-01-29 PROCEDURE — 97110 THERAPEUTIC EXERCISES: CPT | Mod: PO

## 2021-02-01 ENCOUNTER — CLINICAL SUPPORT (OUTPATIENT)
Dept: REHABILITATION | Facility: HOSPITAL | Age: 86
End: 2021-02-01
Attending: INTERNAL MEDICINE
Payer: MEDICARE

## 2021-02-01 DIAGNOSIS — R29.898 DECREASED ROM OF NECK: ICD-10-CM

## 2021-02-01 DIAGNOSIS — M54.12 CERVICAL RADICULOPATHY: ICD-10-CM

## 2021-02-01 DIAGNOSIS — M40.04 POSTURAL KYPHOSIS OF THORACIC REGION: ICD-10-CM

## 2021-02-01 PROCEDURE — 97162 PT EVAL MOD COMPLEX 30 MIN: CPT | Mod: PO

## 2021-02-01 PROCEDURE — 97140 MANUAL THERAPY 1/> REGIONS: CPT | Mod: PO

## 2021-02-01 PROCEDURE — 97110 THERAPEUTIC EXERCISES: CPT | Mod: PO

## 2021-02-02 PROBLEM — M25.619 DECREASED SHOULDER MOBILITY: Status: ACTIVE | Noted: 2019-08-01

## 2021-02-02 PROBLEM — M40.04 POSTURAL KYPHOSIS OF THORACIC REGION: Status: ACTIVE | Noted: 2021-02-02

## 2021-02-03 ENCOUNTER — CLINICAL SUPPORT (OUTPATIENT)
Dept: REHABILITATION | Facility: HOSPITAL | Age: 86
End: 2021-02-03
Attending: INTERNAL MEDICINE
Payer: MEDICARE

## 2021-02-03 DIAGNOSIS — M40.04 POSTURAL KYPHOSIS OF THORACIC REGION: ICD-10-CM

## 2021-02-03 DIAGNOSIS — M25.611 DECREASED RANGE OF MOTION OF RIGHT SHOULDER: ICD-10-CM

## 2021-02-03 DIAGNOSIS — R53.1 DECREASED STRENGTH: ICD-10-CM

## 2021-02-03 DIAGNOSIS — R29.898 DECREASED ROM OF NECK: ICD-10-CM

## 2021-02-03 PROCEDURE — 97110 THERAPEUTIC EXERCISES: CPT | Mod: PO

## 2021-02-03 PROCEDURE — 97140 MANUAL THERAPY 1/> REGIONS: CPT | Mod: PO

## 2021-02-07 ENCOUNTER — IMMUNIZATION (OUTPATIENT)
Dept: INTERNAL MEDICINE | Facility: CLINIC | Age: 86
End: 2021-02-07
Payer: MEDICARE

## 2021-02-07 DIAGNOSIS — Z23 NEED FOR VACCINATION: Primary | ICD-10-CM

## 2021-02-07 PROCEDURE — 91300 COVID-19, MRNA, LNP-S, PF, 30 MCG/0.3 ML DOSE VACCINE: CPT | Mod: PBBFAC | Performed by: FAMILY MEDICINE

## 2021-02-07 PROCEDURE — 0002A COVID-19, MRNA, LNP-S, PF, 30 MCG/0.3 ML DOSE VACCINE: CPT | Mod: PBBFAC | Performed by: FAMILY MEDICINE

## 2021-02-08 ENCOUNTER — CLINICAL SUPPORT (OUTPATIENT)
Dept: REHABILITATION | Facility: HOSPITAL | Age: 86
End: 2021-02-08
Attending: INTERNAL MEDICINE
Payer: MEDICARE

## 2021-02-08 DIAGNOSIS — M40.04 POSTURAL KYPHOSIS OF THORACIC REGION: ICD-10-CM

## 2021-02-08 DIAGNOSIS — R29.898 DECREASED ROM OF NECK: ICD-10-CM

## 2021-02-08 DIAGNOSIS — R53.1 DECREASED STRENGTH: ICD-10-CM

## 2021-02-08 DIAGNOSIS — M25.611 DECREASED RANGE OF MOTION OF RIGHT SHOULDER: ICD-10-CM

## 2021-02-08 PROCEDURE — 97140 MANUAL THERAPY 1/> REGIONS: CPT | Mod: PO

## 2021-02-08 PROCEDURE — 97110 THERAPEUTIC EXERCISES: CPT | Mod: PO

## 2021-02-17 DIAGNOSIS — I10 HYPERTENSION, ESSENTIAL: Chronic | ICD-10-CM

## 2021-02-17 RX ORDER — LORAZEPAM 0.5 MG/1
.25-.5 TABLET ORAL EVERY 12 HOURS PRN
Qty: 30 TABLET | Refills: 0 | Status: SHIPPED | OUTPATIENT
Start: 2021-02-17 | End: 2021-03-25

## 2021-02-18 ENCOUNTER — CLINICAL SUPPORT (OUTPATIENT)
Dept: REHABILITATION | Facility: HOSPITAL | Age: 86
End: 2021-02-18
Attending: INTERNAL MEDICINE
Payer: MEDICARE

## 2021-02-18 DIAGNOSIS — R53.1 DECREASED STRENGTH: ICD-10-CM

## 2021-02-18 DIAGNOSIS — M40.04 POSTURAL KYPHOSIS OF THORACIC REGION: ICD-10-CM

## 2021-02-18 DIAGNOSIS — M25.611 DECREASED RANGE OF MOTION OF RIGHT SHOULDER: ICD-10-CM

## 2021-02-18 DIAGNOSIS — R29.898 DECREASED ROM OF NECK: ICD-10-CM

## 2021-02-18 PROCEDURE — 97140 MANUAL THERAPY 1/> REGIONS: CPT | Mod: PO

## 2021-02-18 PROCEDURE — 97110 THERAPEUTIC EXERCISES: CPT | Mod: PO

## 2021-02-22 ENCOUNTER — TELEPHONE (OUTPATIENT)
Dept: HEMATOLOGY/ONCOLOGY | Facility: CLINIC | Age: 86
End: 2021-02-22

## 2021-02-23 ENCOUNTER — PATIENT MESSAGE (OUTPATIENT)
Dept: RHEUMATOLOGY | Facility: CLINIC | Age: 86
End: 2021-02-23

## 2021-02-24 ENCOUNTER — CLINICAL SUPPORT (OUTPATIENT)
Dept: REHABILITATION | Facility: HOSPITAL | Age: 86
End: 2021-02-24
Attending: INTERNAL MEDICINE
Payer: MEDICARE

## 2021-02-24 DIAGNOSIS — M25.619 DECREASED RANGE OF MOTION OF SHOULDER, UNSPECIFIED LATERALITY: ICD-10-CM

## 2021-02-24 DIAGNOSIS — R29.898 DECREASED ROM OF NECK: ICD-10-CM

## 2021-02-24 DIAGNOSIS — M40.04 POSTURAL KYPHOSIS OF THORACIC REGION: ICD-10-CM

## 2021-02-24 DIAGNOSIS — R53.1 DECREASED STRENGTH: ICD-10-CM

## 2021-02-24 PROCEDURE — 97110 THERAPEUTIC EXERCISES: CPT | Mod: PO

## 2021-02-24 PROCEDURE — 97140 MANUAL THERAPY 1/> REGIONS: CPT | Mod: PO

## 2021-02-25 ENCOUNTER — PATIENT OUTREACH (OUTPATIENT)
Dept: ADMINISTRATIVE | Facility: OTHER | Age: 86
End: 2021-02-25

## 2021-03-01 ENCOUNTER — OFFICE VISIT (OUTPATIENT)
Dept: HEMATOLOGY/ONCOLOGY | Facility: CLINIC | Age: 86
End: 2021-03-01
Payer: MEDICARE

## 2021-03-01 ENCOUNTER — OFFICE VISIT (OUTPATIENT)
Dept: ORTHOPEDICS | Facility: CLINIC | Age: 86
End: 2021-03-01
Payer: MEDICARE

## 2021-03-01 ENCOUNTER — TELEPHONE (OUTPATIENT)
Dept: SURGERY | Facility: CLINIC | Age: 86
End: 2021-03-01

## 2021-03-01 VITALS — WEIGHT: 139.75 LBS | BODY MASS INDEX: 24.76 KG/M2

## 2021-03-01 VITALS
HEART RATE: 58 BPM | DIASTOLIC BLOOD PRESSURE: 67 MMHG | TEMPERATURE: 98 F | WEIGHT: 139.75 LBS | BODY MASS INDEX: 24.76 KG/M2 | HEIGHT: 63 IN | OXYGEN SATURATION: 98 % | SYSTOLIC BLOOD PRESSURE: 159 MMHG | RESPIRATION RATE: 16 BRPM

## 2021-03-01 DIAGNOSIS — Z85.3 PERSONAL HISTORY OF BREAST CANCER: ICD-10-CM

## 2021-03-01 DIAGNOSIS — Z01.818 PRE-OP TESTING: Primary | ICD-10-CM

## 2021-03-01 DIAGNOSIS — N64.4 BREAST PAIN, RIGHT: Primary | ICD-10-CM

## 2021-03-01 DIAGNOSIS — G56.01 CARPAL TUNNEL SYNDROME OF RIGHT WRIST: Primary | ICD-10-CM

## 2021-03-01 PROCEDURE — 99999 PR PBB SHADOW E&M-EST. PATIENT-LVL III: ICD-10-PCS | Mod: PBBFAC,,, | Performed by: ORTHOPAEDIC SURGERY

## 2021-03-01 PROCEDURE — 99213 OFFICE O/P EST LOW 20 MIN: CPT | Mod: PBBFAC,27,PO | Performed by: ORTHOPAEDIC SURGERY

## 2021-03-01 PROCEDURE — 99215 OFFICE O/P EST HI 40 MIN: CPT | Mod: PBBFAC | Performed by: NURSE PRACTITIONER

## 2021-03-01 PROCEDURE — 99999 PR PBB SHADOW E&M-EST. PATIENT-LVL III: CPT | Mod: PBBFAC,,, | Performed by: ORTHOPAEDIC SURGERY

## 2021-03-01 PROCEDURE — 99214 PR OFFICE/OUTPT VISIT, EST, LEVL IV, 30-39 MIN: ICD-10-PCS | Mod: S$PBB,,, | Performed by: ORTHOPAEDIC SURGERY

## 2021-03-01 PROCEDURE — 99214 OFFICE O/P EST MOD 30 MIN: CPT | Mod: S$PBB,,, | Performed by: ORTHOPAEDIC SURGERY

## 2021-03-01 PROCEDURE — 99214 OFFICE O/P EST MOD 30 MIN: CPT | Mod: S$PBB,,, | Performed by: NURSE PRACTITIONER

## 2021-03-01 PROCEDURE — 99214 PR OFFICE/OUTPT VISIT, EST, LEVL IV, 30-39 MIN: ICD-10-PCS | Mod: S$PBB,,, | Performed by: NURSE PRACTITIONER

## 2021-03-01 PROCEDURE — 99999 PR PBB SHADOW E&M-EST. PATIENT-LVL V: ICD-10-PCS | Mod: PBBFAC,,, | Performed by: NURSE PRACTITIONER

## 2021-03-01 PROCEDURE — 99999 PR PBB SHADOW E&M-EST. PATIENT-LVL V: CPT | Mod: PBBFAC,,, | Performed by: NURSE PRACTITIONER

## 2021-03-02 ENCOUNTER — CLINICAL SUPPORT (OUTPATIENT)
Dept: REHABILITATION | Facility: HOSPITAL | Age: 86
End: 2021-03-02
Attending: INTERNAL MEDICINE
Payer: MEDICARE

## 2021-03-02 DIAGNOSIS — M25.619 DECREASED RANGE OF MOTION OF SHOULDER, UNSPECIFIED LATERALITY: ICD-10-CM

## 2021-03-02 DIAGNOSIS — M40.04 POSTURAL KYPHOSIS OF THORACIC REGION: ICD-10-CM

## 2021-03-02 DIAGNOSIS — R53.1 DECREASED STRENGTH: ICD-10-CM

## 2021-03-02 DIAGNOSIS — R29.898 DECREASED ROM OF NECK: ICD-10-CM

## 2021-03-02 PROCEDURE — 97110 THERAPEUTIC EXERCISES: CPT | Mod: PO

## 2021-03-02 PROCEDURE — 97530 THERAPEUTIC ACTIVITIES: CPT | Mod: PO

## 2021-03-04 ENCOUNTER — CLINICAL SUPPORT (OUTPATIENT)
Dept: REHABILITATION | Facility: HOSPITAL | Age: 86
End: 2021-03-04
Attending: INTERNAL MEDICINE
Payer: MEDICARE

## 2021-03-04 DIAGNOSIS — M25.619 DECREASED RANGE OF MOTION OF SHOULDER, UNSPECIFIED LATERALITY: ICD-10-CM

## 2021-03-04 DIAGNOSIS — R53.1 DECREASED STRENGTH: ICD-10-CM

## 2021-03-04 DIAGNOSIS — G56.01 CARPAL TUNNEL SYNDROME OF RIGHT WRIST: Primary | ICD-10-CM

## 2021-03-04 DIAGNOSIS — M40.04 POSTURAL KYPHOSIS OF THORACIC REGION: ICD-10-CM

## 2021-03-04 DIAGNOSIS — R29.898 DECREASED ROM OF NECK: ICD-10-CM

## 2021-03-04 PROCEDURE — 97110 THERAPEUTIC EXERCISES: CPT | Mod: PO,CQ

## 2021-03-08 ENCOUNTER — ANESTHESIA EVENT (OUTPATIENT)
Dept: SURGERY | Facility: HOSPITAL | Age: 86
End: 2021-03-08
Payer: MEDICARE

## 2021-03-09 ENCOUNTER — CLINICAL SUPPORT (OUTPATIENT)
Dept: REHABILITATION | Facility: HOSPITAL | Age: 86
End: 2021-03-09
Attending: INTERNAL MEDICINE
Payer: MEDICARE

## 2021-03-09 DIAGNOSIS — M40.04 POSTURAL KYPHOSIS OF THORACIC REGION: ICD-10-CM

## 2021-03-09 DIAGNOSIS — R29.898 DECREASED ROM OF NECK: ICD-10-CM

## 2021-03-09 DIAGNOSIS — M25.619 DECREASED RANGE OF MOTION OF SHOULDER, UNSPECIFIED LATERALITY: ICD-10-CM

## 2021-03-09 DIAGNOSIS — R53.1 DECREASED STRENGTH: ICD-10-CM

## 2021-03-09 PROCEDURE — 97140 MANUAL THERAPY 1/> REGIONS: CPT | Mod: PO

## 2021-03-09 PROCEDURE — 97110 THERAPEUTIC EXERCISES: CPT | Mod: PO

## 2021-03-10 ENCOUNTER — PATIENT MESSAGE (OUTPATIENT)
Dept: ADMINISTRATIVE | Facility: OTHER | Age: 86
End: 2021-03-10

## 2021-03-10 ENCOUNTER — OFFICE VISIT (OUTPATIENT)
Dept: ORTHOPEDICS | Facility: CLINIC | Age: 86
End: 2021-03-10
Payer: MEDICARE

## 2021-03-10 VITALS
WEIGHT: 138 LBS | SYSTOLIC BLOOD PRESSURE: 158 MMHG | HEIGHT: 63 IN | HEART RATE: 64 BPM | BODY MASS INDEX: 24.45 KG/M2 | DIASTOLIC BLOOD PRESSURE: 58 MMHG

## 2021-03-10 DIAGNOSIS — M17.11 PRIMARY OSTEOARTHRITIS OF RIGHT KNEE: Primary | ICD-10-CM

## 2021-03-10 PROCEDURE — 20610 DRAIN/INJ JOINT/BURSA W/O US: CPT | Mod: PBBFAC | Performed by: PHYSICIAN ASSISTANT

## 2021-03-10 PROCEDURE — 99213 OFFICE O/P EST LOW 20 MIN: CPT | Mod: 25,S$PBB,, | Performed by: PHYSICIAN ASSISTANT

## 2021-03-10 PROCEDURE — 99999 PR PBB SHADOW E&M-EST. PATIENT-LVL III: CPT | Mod: PBBFAC,,, | Performed by: PHYSICIAN ASSISTANT

## 2021-03-10 PROCEDURE — 20610 DRAIN/INJ JOINT/BURSA W/O US: CPT | Mod: S$PBB,RT,, | Performed by: PHYSICIAN ASSISTANT

## 2021-03-10 PROCEDURE — 20610 PR DRAIN/INJECT LARGE JOINT/BURSA: ICD-10-PCS | Mod: S$PBB,RT,, | Performed by: PHYSICIAN ASSISTANT

## 2021-03-10 PROCEDURE — 99213 OFFICE O/P EST LOW 20 MIN: CPT | Mod: PBBFAC,25 | Performed by: PHYSICIAN ASSISTANT

## 2021-03-10 PROCEDURE — 99999 PR PBB SHADOW E&M-EST. PATIENT-LVL III: ICD-10-PCS | Mod: PBBFAC,,, | Performed by: PHYSICIAN ASSISTANT

## 2021-03-10 PROCEDURE — 99213 PR OFFICE/OUTPT VISIT, EST, LEVL III, 20-29 MIN: ICD-10-PCS | Mod: 25,S$PBB,, | Performed by: PHYSICIAN ASSISTANT

## 2021-03-10 RX ORDER — BETAMETHASONE SODIUM PHOSPHATE AND BETAMETHASONE ACETATE 3; 3 MG/ML; MG/ML
6 INJECTION, SUSPENSION INTRA-ARTICULAR; INTRALESIONAL; INTRAMUSCULAR; SOFT TISSUE
Status: COMPLETED | OUTPATIENT
Start: 2021-03-10 | End: 2021-03-10

## 2021-03-10 RX ADMIN — BETAMETHASONE SODIUM PHOSPHATE AND BETAMETHASONE ACETATE 6 MG: 3; 3 INJECTION, SUSPENSION INTRA-ARTICULAR; INTRALESIONAL; INTRAMUSCULAR at 07:03

## 2021-03-11 ENCOUNTER — CLINICAL SUPPORT (OUTPATIENT)
Dept: REHABILITATION | Facility: HOSPITAL | Age: 86
End: 2021-03-11
Attending: INTERNAL MEDICINE
Payer: MEDICARE

## 2021-03-11 ENCOUNTER — TELEPHONE (OUTPATIENT)
Dept: ORTHOPEDICS | Facility: CLINIC | Age: 86
End: 2021-03-11

## 2021-03-11 DIAGNOSIS — M40.04 POSTURAL KYPHOSIS OF THORACIC REGION: ICD-10-CM

## 2021-03-11 DIAGNOSIS — R53.1 DECREASED STRENGTH: ICD-10-CM

## 2021-03-11 DIAGNOSIS — R29.898 DECREASED ROM OF NECK: ICD-10-CM

## 2021-03-11 DIAGNOSIS — M25.611 DECREASED RANGE OF MOTION OF RIGHT SHOULDER: ICD-10-CM

## 2021-03-11 PROCEDURE — 97110 THERAPEUTIC EXERCISES: CPT | Mod: PO

## 2021-03-11 PROCEDURE — 97140 MANUAL THERAPY 1/> REGIONS: CPT | Mod: PO

## 2021-03-13 ENCOUNTER — LAB VISIT (OUTPATIENT)
Dept: INTERNAL MEDICINE | Facility: CLINIC | Age: 86
End: 2021-03-13
Payer: MEDICARE

## 2021-03-13 DIAGNOSIS — Z01.818 PRE-OP TESTING: ICD-10-CM

## 2021-03-13 PROCEDURE — U0005 INFEC AGEN DETEC AMPLI PROBE: HCPCS | Performed by: ORTHOPAEDIC SURGERY

## 2021-03-13 PROCEDURE — U0003 INFECTIOUS AGENT DETECTION BY NUCLEIC ACID (DNA OR RNA); SEVERE ACUTE RESPIRATORY SYNDROME CORONAVIRUS 2 (SARS-COV-2) (CORONAVIRUS DISEASE [COVID-19]), AMPLIFIED PROBE TECHNIQUE, MAKING USE OF HIGH THROUGHPUT TECHNOLOGIES AS DESCRIBED BY CMS-2020-01-R: HCPCS | Performed by: ORTHOPAEDIC SURGERY

## 2021-03-14 DIAGNOSIS — G56.00 CARPAL TUNNEL SYNDROME: ICD-10-CM

## 2021-03-14 LAB — SARS-COV-2 RNA RESP QL NAA+PROBE: NOT DETECTED

## 2021-03-14 RX ORDER — OXYCODONE HYDROCHLORIDE 5 MG/1
5 TABLET ORAL EVERY 6 HOURS PRN
Qty: 10 TABLET | Refills: 0 | Status: SHIPPED | OUTPATIENT
Start: 2021-03-14 | End: 2021-04-28

## 2021-03-14 RX ORDER — SODIUM CHLORIDE 9 MG/ML
INJECTION, SOLUTION INTRAVENOUS CONTINUOUS
Status: CANCELLED | OUTPATIENT
Start: 2021-03-14

## 2021-03-14 RX ORDER — MUPIROCIN 20 MG/G
OINTMENT TOPICAL
Status: CANCELLED | OUTPATIENT
Start: 2021-03-14

## 2021-03-15 ENCOUNTER — TELEPHONE (OUTPATIENT)
Dept: ORTHOPEDICS | Facility: CLINIC | Age: 86
End: 2021-03-15

## 2021-03-15 ENCOUNTER — HOSPITAL ENCOUNTER (OUTPATIENT)
Dept: RADIOLOGY | Facility: HOSPITAL | Age: 86
Discharge: HOME | End: 2021-03-15
Attending: SURGERY
Payer: MEDICARE

## 2021-03-15 ENCOUNTER — OFFICE VISIT (OUTPATIENT)
Dept: SURGERY | Facility: CLINIC | Age: 86
End: 2021-03-15
Payer: MEDICARE

## 2021-03-15 VITALS
BODY MASS INDEX: 25.01 KG/M2 | HEART RATE: 60 BPM | SYSTOLIC BLOOD PRESSURE: 152 MMHG | HEIGHT: 63 IN | WEIGHT: 141.13 LBS | DIASTOLIC BLOOD PRESSURE: 69 MMHG | TEMPERATURE: 97 F

## 2021-03-15 DIAGNOSIS — N64.4 BREAST PAIN, RIGHT: ICD-10-CM

## 2021-03-15 DIAGNOSIS — Z85.3 PERSONAL HISTORY OF BREAST CANCER: ICD-10-CM

## 2021-03-15 DIAGNOSIS — N63.10 BREAST MASS, RIGHT: Primary | ICD-10-CM

## 2021-03-15 DIAGNOSIS — N63.10 BREAST MASS, RIGHT: ICD-10-CM

## 2021-03-15 PROCEDURE — 99215 OFFICE O/P EST HI 40 MIN: CPT | Mod: PBBFAC | Performed by: SURGERY

## 2021-03-15 PROCEDURE — 77061 MAMMO DIGITAL DIAGNOSTIC RIGHT WITH TOMO: ICD-10-PCS | Mod: 26,RT,, | Performed by: RADIOLOGY

## 2021-03-15 PROCEDURE — 77065 MAMMO DIGITAL DIAGNOSTIC RIGHT WITH TOMO: ICD-10-PCS | Mod: 26,RT,, | Performed by: RADIOLOGY

## 2021-03-15 PROCEDURE — 99205 PR OFFICE/OUTPT VISIT, NEW, LEVL V, 60-74 MIN: ICD-10-PCS | Mod: S$PBB,,, | Performed by: SURGERY

## 2021-03-15 PROCEDURE — 99999 PR PBB SHADOW E&M-EST. PATIENT-LVL V: ICD-10-PCS | Mod: PBBFAC,,, | Performed by: SURGERY

## 2021-03-15 PROCEDURE — 77065 DX MAMMO INCL CAD UNI: CPT | Mod: 26,RT,, | Performed by: RADIOLOGY

## 2021-03-15 PROCEDURE — 77061 BREAST TOMOSYNTHESIS UNI: CPT | Mod: 26,RT,, | Performed by: RADIOLOGY

## 2021-03-15 PROCEDURE — 99205 OFFICE O/P NEW HI 60 MIN: CPT | Mod: S$PBB,,, | Performed by: SURGERY

## 2021-03-15 PROCEDURE — 99999 PR PBB SHADOW E&M-EST. PATIENT-LVL V: CPT | Mod: PBBFAC,,, | Performed by: SURGERY

## 2021-03-15 PROCEDURE — 77061 BREAST TOMOSYNTHESIS UNI: CPT | Mod: TC,RT

## 2021-03-16 ENCOUNTER — ANESTHESIA (OUTPATIENT)
Dept: SURGERY | Facility: HOSPITAL | Age: 86
End: 2021-03-16
Payer: MEDICARE

## 2021-03-16 ENCOUNTER — HOSPITAL ENCOUNTER (OUTPATIENT)
Facility: HOSPITAL | Age: 86
Discharge: HOME | End: 2021-03-16
Attending: ORTHOPAEDIC SURGERY | Admitting: ORTHOPAEDIC SURGERY
Payer: MEDICARE

## 2021-03-16 VITALS
OXYGEN SATURATION: 95 % | DIASTOLIC BLOOD PRESSURE: 74 MMHG | SYSTOLIC BLOOD PRESSURE: 179 MMHG | TEMPERATURE: 97 F | BODY MASS INDEX: 24.8 KG/M2 | RESPIRATION RATE: 44 BRPM | HEIGHT: 63 IN | HEART RATE: 53 BPM | WEIGHT: 140 LBS

## 2021-03-16 DIAGNOSIS — G56.00 CARPAL TUNNEL SYNDROME: ICD-10-CM

## 2021-03-16 PROCEDURE — 25000003 PHARM REV CODE 250: Performed by: NURSE ANESTHETIST, CERTIFIED REGISTERED

## 2021-03-16 PROCEDURE — 94761 N-INVAS EAR/PLS OXIMETRY MLT: CPT

## 2021-03-16 PROCEDURE — D9220A PRA ANESTHESIA: ICD-10-PCS | Mod: CRNA,,, | Performed by: NURSE ANESTHETIST, CERTIFIED REGISTERED

## 2021-03-16 PROCEDURE — 37000009 HC ANESTHESIA EA ADD 15 MINS: Performed by: ORTHOPAEDIC SURGERY

## 2021-03-16 PROCEDURE — D9220A PRA ANESTHESIA: Mod: CRNA,,, | Performed by: NURSE ANESTHETIST, CERTIFIED REGISTERED

## 2021-03-16 PROCEDURE — 25000003 PHARM REV CODE 250: Performed by: STUDENT IN AN ORGANIZED HEALTH CARE EDUCATION/TRAINING PROGRAM

## 2021-03-16 PROCEDURE — 36000706: Performed by: ORTHOPAEDIC SURGERY

## 2021-03-16 PROCEDURE — 63600175 PHARM REV CODE 636 W HCPCS: Performed by: NURSE ANESTHETIST, CERTIFIED REGISTERED

## 2021-03-16 PROCEDURE — 64721 CARPAL TUNNEL SURGERY: CPT | Mod: RT,,, | Performed by: ORTHOPAEDIC SURGERY

## 2021-03-16 PROCEDURE — 63600175 PHARM REV CODE 636 W HCPCS: Performed by: STUDENT IN AN ORGANIZED HEALTH CARE EDUCATION/TRAINING PROGRAM

## 2021-03-16 PROCEDURE — 25000003 PHARM REV CODE 250: Performed by: ANESTHESIOLOGY

## 2021-03-16 PROCEDURE — 25000003 PHARM REV CODE 250: Performed by: ORTHOPAEDIC SURGERY

## 2021-03-16 PROCEDURE — 36000707: Performed by: ORTHOPAEDIC SURGERY

## 2021-03-16 PROCEDURE — 99900035 HC TECH TIME PER 15 MIN (STAT)

## 2021-03-16 PROCEDURE — D9220A PRA ANESTHESIA: ICD-10-PCS | Mod: ANES,,, | Performed by: ANESTHESIOLOGY

## 2021-03-16 PROCEDURE — 71000033 HC RECOVERY, INTIAL HOUR: Performed by: ORTHOPAEDIC SURGERY

## 2021-03-16 PROCEDURE — 37000008 HC ANESTHESIA 1ST 15 MINUTES: Performed by: ORTHOPAEDIC SURGERY

## 2021-03-16 PROCEDURE — 27201423 OPTIME MED/SURG SUP & DEVICES STERILE SUPPLY: Performed by: ORTHOPAEDIC SURGERY

## 2021-03-16 PROCEDURE — 71000015 HC POSTOP RECOV 1ST HR: Performed by: ORTHOPAEDIC SURGERY

## 2021-03-16 PROCEDURE — D9220A PRA ANESTHESIA: Mod: ANES,,, | Performed by: ANESTHESIOLOGY

## 2021-03-16 PROCEDURE — 64721 PR REVISE MEDIAN N/CARPAL TUNNEL SURG: ICD-10-PCS | Mod: RT,,, | Performed by: ORTHOPAEDIC SURGERY

## 2021-03-16 RX ORDER — MIDAZOLAM HYDROCHLORIDE 1 MG/ML
0.5 INJECTION INTRAMUSCULAR; INTRAVENOUS
Status: DISCONTINUED | OUTPATIENT
Start: 2021-03-16 | End: 2021-03-16 | Stop reason: HOSPADM

## 2021-03-16 RX ORDER — OXYCODONE HYDROCHLORIDE 5 MG/1
10 TABLET ORAL EVERY 4 HOURS PRN
Status: DISCONTINUED | OUTPATIENT
Start: 2021-03-16 | End: 2021-03-16 | Stop reason: HOSPADM

## 2021-03-16 RX ORDER — PROPOFOL 10 MG/ML
VIAL (ML) INTRAVENOUS
Status: DISCONTINUED | OUTPATIENT
Start: 2021-03-16 | End: 2021-03-16

## 2021-03-16 RX ORDER — OXYCODONE HYDROCHLORIDE 5 MG/1
5 TABLET ORAL
Status: DISCONTINUED | OUTPATIENT
Start: 2021-03-16 | End: 2021-03-16 | Stop reason: HOSPADM

## 2021-03-16 RX ORDER — OXYCODONE HYDROCHLORIDE 5 MG/1
5 TABLET ORAL EVERY 4 HOURS PRN
Status: DISCONTINUED | OUTPATIENT
Start: 2021-03-16 | End: 2021-03-16 | Stop reason: HOSPADM

## 2021-03-16 RX ORDER — LIDOCAINE HYDROCHLORIDE 10 MG/ML
INJECTION, SOLUTION EPIDURAL; INFILTRATION; INTRACAUDAL; PERINEURAL
Status: DISCONTINUED | OUTPATIENT
Start: 2021-03-16 | End: 2021-03-16 | Stop reason: HOSPADM

## 2021-03-16 RX ORDER — SODIUM CHLORIDE 9 MG/ML
INJECTION, SOLUTION INTRAVENOUS CONTINUOUS
Status: DISCONTINUED | OUTPATIENT
Start: 2021-03-16 | End: 2021-03-16 | Stop reason: HOSPADM

## 2021-03-16 RX ORDER — ONDANSETRON 2 MG/ML
INJECTION INTRAMUSCULAR; INTRAVENOUS
Status: DISCONTINUED | OUTPATIENT
Start: 2021-03-16 | End: 2021-03-16

## 2021-03-16 RX ORDER — DEXAMETHASONE SODIUM PHOSPHATE 4 MG/ML
INJECTION, SOLUTION INTRA-ARTICULAR; INTRALESIONAL; INTRAMUSCULAR; INTRAVENOUS; SOFT TISSUE
Status: DISCONTINUED | OUTPATIENT
Start: 2021-03-16 | End: 2021-03-16

## 2021-03-16 RX ORDER — BUPIVACAINE HYDROCHLORIDE 2.5 MG/ML
INJECTION, SOLUTION EPIDURAL; INFILTRATION; INTRACAUDAL
Status: DISCONTINUED | OUTPATIENT
Start: 2021-03-16 | End: 2021-03-16 | Stop reason: HOSPADM

## 2021-03-16 RX ORDER — LIDOCAINE HYDROCHLORIDE 20 MG/ML
INJECTION INTRAVENOUS
Status: DISCONTINUED | OUTPATIENT
Start: 2021-03-16 | End: 2021-03-16

## 2021-03-16 RX ORDER — FENTANYL CITRATE 50 UG/ML
25 INJECTION, SOLUTION INTRAMUSCULAR; INTRAVENOUS EVERY 5 MIN PRN
Status: DISCONTINUED | OUTPATIENT
Start: 2021-03-16 | End: 2021-03-16 | Stop reason: HOSPADM

## 2021-03-16 RX ORDER — MUPIROCIN 20 MG/G
OINTMENT TOPICAL
Status: DISCONTINUED | OUTPATIENT
Start: 2021-03-16 | End: 2021-03-16 | Stop reason: HOSPADM

## 2021-03-16 RX ORDER — PROPOFOL 10 MG/ML
VIAL (ML) INTRAVENOUS CONTINUOUS PRN
Status: DISCONTINUED | OUTPATIENT
Start: 2021-03-16 | End: 2021-03-16

## 2021-03-16 RX ORDER — ONDANSETRON 4 MG/1
8 TABLET, ORALLY DISINTEGRATING ORAL EVERY 8 HOURS PRN
Status: DISCONTINUED | OUTPATIENT
Start: 2021-03-16 | End: 2021-03-16 | Stop reason: HOSPADM

## 2021-03-16 RX ORDER — BACITRACIN ZINC 500 UNIT/G
OINTMENT (GRAM) TOPICAL
Status: DISCONTINUED | OUTPATIENT
Start: 2021-03-16 | End: 2021-03-16 | Stop reason: HOSPADM

## 2021-03-16 RX ORDER — ACETAMINOPHEN 325 MG/1
650 TABLET ORAL EVERY 4 HOURS PRN
Status: DISCONTINUED | OUTPATIENT
Start: 2021-03-16 | End: 2021-03-16 | Stop reason: HOSPADM

## 2021-03-16 RX ORDER — MUPIROCIN 20 MG/G
OINTMENT TOPICAL 2 TIMES DAILY
Status: DISCONTINUED | OUTPATIENT
Start: 2021-03-16 | End: 2021-03-16 | Stop reason: HOSPADM

## 2021-03-16 RX ORDER — FAMOTIDINE 10 MG/ML
INJECTION INTRAVENOUS
Status: DISCONTINUED | OUTPATIENT
Start: 2021-03-16 | End: 2021-03-16

## 2021-03-16 RX ORDER — FENTANYL CITRATE 50 UG/ML
INJECTION, SOLUTION INTRAMUSCULAR; INTRAVENOUS
Status: DISCONTINUED | OUTPATIENT
Start: 2021-03-16 | End: 2021-03-16

## 2021-03-16 RX ORDER — ACETAMINOPHEN 500 MG
1000 TABLET ORAL ONCE
Status: COMPLETED | OUTPATIENT
Start: 2021-03-16 | End: 2021-03-16

## 2021-03-16 RX ORDER — CEFAZOLIN SODIUM 1 G/3ML
2 INJECTION, POWDER, FOR SOLUTION INTRAMUSCULAR; INTRAVENOUS
Status: COMPLETED | OUTPATIENT
Start: 2021-03-16 | End: 2021-03-16

## 2021-03-16 RX ORDER — SODIUM CHLORIDE 0.9 % (FLUSH) 0.9 %
10 SYRINGE (ML) INJECTION
Status: DISCONTINUED | OUTPATIENT
Start: 2021-03-16 | End: 2021-03-16 | Stop reason: HOSPADM

## 2021-03-16 RX ADMIN — DEXAMETHASONE SODIUM PHOSPHATE 8 MG: 4 INJECTION, SOLUTION INTRAMUSCULAR; INTRAVENOUS at 12:03

## 2021-03-16 RX ADMIN — FENTANYL CITRATE 25 MCG: 50 INJECTION, SOLUTION INTRAMUSCULAR; INTRAVENOUS at 11:03

## 2021-03-16 RX ADMIN — FENTANYL CITRATE 25 MCG: 50 INJECTION, SOLUTION INTRAMUSCULAR; INTRAVENOUS at 12:03

## 2021-03-16 RX ADMIN — CEFAZOLIN 2 G: 330 INJECTION, POWDER, FOR SOLUTION INTRAMUSCULAR; INTRAVENOUS at 11:03

## 2021-03-16 RX ADMIN — FAMOTIDINE 20 MG: 10 INJECTION, SOLUTION INTRAVENOUS at 12:03

## 2021-03-16 RX ADMIN — PROPOFOL 50 MCG/KG/MIN: 10 INJECTION, EMULSION INTRAVENOUS at 11:03

## 2021-03-16 RX ADMIN — SODIUM CHLORIDE: 0.9 INJECTION, SOLUTION INTRAVENOUS at 10:03

## 2021-03-16 RX ADMIN — PROPOFOL 20 MG: 10 INJECTION, EMULSION INTRAVENOUS at 11:03

## 2021-03-16 RX ADMIN — ACETAMINOPHEN 1000 MG: 500 TABLET ORAL at 10:03

## 2021-03-16 RX ADMIN — MUPIROCIN: 20 OINTMENT TOPICAL at 10:03

## 2021-03-16 RX ADMIN — ONDANSETRON 4 MG: 2 INJECTION, SOLUTION INTRAMUSCULAR; INTRAVENOUS at 12:03

## 2021-03-16 RX ADMIN — LIDOCAINE HYDROCHLORIDE 60 MG: 20 INJECTION, SOLUTION INTRAVENOUS at 11:03

## 2021-03-17 ENCOUNTER — TELEPHONE (OUTPATIENT)
Dept: ORTHOPEDICS | Facility: CLINIC | Age: 86
End: 2021-03-17

## 2021-03-17 ENCOUNTER — HOSPITAL ENCOUNTER (OUTPATIENT)
Dept: RADIOLOGY | Facility: HOSPITAL | Age: 86
Discharge: HOME | End: 2021-03-17
Attending: INTERNAL MEDICINE
Payer: MEDICARE

## 2021-03-17 ENCOUNTER — OFFICE VISIT (OUTPATIENT)
Dept: INTERNAL MEDICINE | Facility: CLINIC | Age: 86
End: 2021-03-17
Payer: MEDICARE

## 2021-03-17 VITALS
WEIGHT: 141 LBS | TEMPERATURE: 98 F | OXYGEN SATURATION: 97 % | HEIGHT: 64 IN | BODY MASS INDEX: 24.07 KG/M2 | SYSTOLIC BLOOD PRESSURE: 160 MMHG | HEART RATE: 67 BPM | DIASTOLIC BLOOD PRESSURE: 58 MMHG | RESPIRATION RATE: 18 BRPM

## 2021-03-17 DIAGNOSIS — R22.2 MASS OF CHEST: ICD-10-CM

## 2021-03-17 DIAGNOSIS — G89.29 CHRONIC PAIN OF BOTH KNEES: ICD-10-CM

## 2021-03-17 DIAGNOSIS — M25.562 CHRONIC PAIN OF BOTH KNEES: ICD-10-CM

## 2021-03-17 DIAGNOSIS — R22.2 MASS OF CHEST: Primary | ICD-10-CM

## 2021-03-17 DIAGNOSIS — M25.561 CHRONIC PAIN OF BOTH KNEES: ICD-10-CM

## 2021-03-17 PROCEDURE — 99999 PR PBB SHADOW E&M-EST. PATIENT-LVL III: ICD-10-PCS | Mod: PBBFAC,,, | Performed by: INTERNAL MEDICINE

## 2021-03-17 PROCEDURE — 99999 PR PBB SHADOW E&M-EST. PATIENT-LVL III: CPT | Mod: PBBFAC,,, | Performed by: INTERNAL MEDICINE

## 2021-03-17 PROCEDURE — 99213 OFFICE O/P EST LOW 20 MIN: CPT | Mod: PBBFAC,PO | Performed by: INTERNAL MEDICINE

## 2021-03-17 PROCEDURE — 73000 X-RAY EXAM OF COLLAR BONE: CPT | Mod: TC,PO,RT

## 2021-03-17 PROCEDURE — 99213 PR OFFICE/OUTPT VISIT, EST, LEVL III, 20-29 MIN: ICD-10-PCS | Mod: S$PBB,,, | Performed by: INTERNAL MEDICINE

## 2021-03-17 PROCEDURE — 99213 OFFICE O/P EST LOW 20 MIN: CPT | Mod: S$PBB,,, | Performed by: INTERNAL MEDICINE

## 2021-03-17 PROCEDURE — 73000 XR CLAVICLE RIGHT: ICD-10-PCS | Mod: 26,RT,, | Performed by: RADIOLOGY

## 2021-03-17 PROCEDURE — 73000 X-RAY EXAM OF COLLAR BONE: CPT | Mod: 26,RT,, | Performed by: RADIOLOGY

## 2021-03-17 RX ORDER — GABAPENTIN 100 MG/1
100-200 CAPSULE ORAL NIGHTLY PRN
Qty: 60 CAPSULE | Refills: 0 | Status: SHIPPED | OUTPATIENT
Start: 2021-03-17 | End: 2021-08-17

## 2021-03-24 ENCOUNTER — HOSPITAL ENCOUNTER (OUTPATIENT)
Dept: RADIOLOGY | Facility: HOSPITAL | Age: 86
Discharge: HOME | End: 2021-03-24
Attending: INTERNAL MEDICINE
Payer: MEDICARE

## 2021-03-24 DIAGNOSIS — R22.2 MASS OF CHEST: ICD-10-CM

## 2021-03-24 PROCEDURE — 76604 US EXAM CHEST: CPT | Mod: TC

## 2021-03-24 PROCEDURE — 76604 US SOFT TISSUE CHEST_UPPER BACK: ICD-10-PCS | Mod: 26,,, | Performed by: RADIOLOGY

## 2021-03-24 PROCEDURE — 76604 US EXAM CHEST: CPT | Mod: 26,,, | Performed by: RADIOLOGY

## 2021-03-25 DIAGNOSIS — I10 HYPERTENSION, ESSENTIAL: Chronic | ICD-10-CM

## 2021-03-25 RX ORDER — LORAZEPAM 0.5 MG/1
.25-.5 TABLET ORAL EVERY 12 HOURS PRN
Qty: 30 TABLET | Refills: 0 | Status: SHIPPED | OUTPATIENT
Start: 2021-03-25 | End: 2021-05-03

## 2021-03-26 ENCOUNTER — OFFICE VISIT (OUTPATIENT)
Dept: ORTHOPEDICS | Facility: CLINIC | Age: 86
End: 2021-03-26
Payer: MEDICARE

## 2021-03-26 VITALS — WEIGHT: 141.13 LBS | BODY MASS INDEX: 24.22 KG/M2

## 2021-03-26 DIAGNOSIS — G56.01 CARPAL TUNNEL SYNDROME OF RIGHT WRIST: Primary | ICD-10-CM

## 2021-03-26 PROCEDURE — 99024 POSTOP FOLLOW-UP VISIT: CPT | Mod: POP,,, | Performed by: ORTHOPAEDIC SURGERY

## 2021-03-26 PROCEDURE — 99024 PR POST-OP FOLLOW-UP VISIT: ICD-10-PCS | Mod: POP,,, | Performed by: ORTHOPAEDIC SURGERY

## 2021-03-26 PROCEDURE — 99999 PR PBB SHADOW E&M-EST. PATIENT-LVL III: CPT | Mod: PBBFAC,,, | Performed by: ORTHOPAEDIC SURGERY

## 2021-03-26 PROCEDURE — 99999 PR PBB SHADOW E&M-EST. PATIENT-LVL III: ICD-10-PCS | Mod: PBBFAC,,, | Performed by: ORTHOPAEDIC SURGERY

## 2021-03-26 PROCEDURE — 99213 OFFICE O/P EST LOW 20 MIN: CPT | Mod: PBBFAC,PO | Performed by: ORTHOPAEDIC SURGERY

## 2021-03-29 ENCOUNTER — TELEPHONE (OUTPATIENT)
Dept: INTERNAL MEDICINE | Facility: CLINIC | Age: 86
End: 2021-03-29

## 2021-04-24 ENCOUNTER — PATIENT OUTREACH (OUTPATIENT)
Dept: ADMINISTRATIVE | Facility: OTHER | Age: 86
End: 2021-04-24

## 2021-04-26 ENCOUNTER — OFFICE VISIT (OUTPATIENT)
Dept: ORTHOPEDICS | Facility: CLINIC | Age: 86
End: 2021-04-26
Payer: MEDICARE

## 2021-04-26 ENCOUNTER — HOSPITAL ENCOUNTER (OUTPATIENT)
Dept: RADIOLOGY | Facility: HOSPITAL | Age: 86
Discharge: HOME | End: 2021-04-26
Attending: ORTHOPAEDIC SURGERY
Payer: MEDICARE

## 2021-04-26 VITALS — BODY MASS INDEX: 24.22 KG/M2 | WEIGHT: 141.13 LBS

## 2021-04-26 DIAGNOSIS — S69.90XA FINGER INJURY, INITIAL ENCOUNTER: ICD-10-CM

## 2021-04-26 DIAGNOSIS — M79.645 FINGER PAIN, LEFT: Primary | ICD-10-CM

## 2021-04-26 DIAGNOSIS — M79.645 FINGER PAIN, LEFT: ICD-10-CM

## 2021-04-26 DIAGNOSIS — G56.01 CARPAL TUNNEL SYNDROME OF RIGHT WRIST: Primary | ICD-10-CM

## 2021-04-26 DIAGNOSIS — M67.442 GANGLION, FINGER JOINT OF LEFT HAND: ICD-10-CM

## 2021-04-26 PROCEDURE — 20612: ICD-10-PCS | Mod: S$PBB,79,LT, | Performed by: ORTHOPAEDIC SURGERY

## 2021-04-26 PROCEDURE — 20612 ASPIRATE/INJ GANGLION CYST: CPT | Mod: PBBFAC,PO | Performed by: ORTHOPAEDIC SURGERY

## 2021-04-26 PROCEDURE — 73140 X-RAY EXAM OF FINGER(S): CPT | Mod: TC,PO,LT

## 2021-04-26 PROCEDURE — 99024 POSTOP FOLLOW-UP VISIT: CPT | Mod: POP,,, | Performed by: ORTHOPAEDIC SURGERY

## 2021-04-26 PROCEDURE — 73140 X-RAY EXAM OF FINGER(S): CPT | Mod: 26,LT,, | Performed by: RADIOLOGY

## 2021-04-26 PROCEDURE — 99213 OFFICE O/P EST LOW 20 MIN: CPT | Mod: PBBFAC,25,PO | Performed by: ORTHOPAEDIC SURGERY

## 2021-04-26 PROCEDURE — 99999 PR PBB SHADOW E&M-EST. PATIENT-LVL III: CPT | Mod: PBBFAC,,, | Performed by: ORTHOPAEDIC SURGERY

## 2021-04-26 PROCEDURE — 20600 DRAIN/INJ JOINT/BURSA W/O US: CPT | Mod: PBBFAC,PO,LT | Performed by: ORTHOPAEDIC SURGERY

## 2021-04-26 PROCEDURE — 73140 XR FINGER 2 OR MORE VIEWS LEFT: ICD-10-PCS | Mod: 26,LT,, | Performed by: RADIOLOGY

## 2021-04-26 PROCEDURE — 99213 OFFICE O/P EST LOW 20 MIN: CPT | Mod: S$PBB,24,25, | Performed by: ORTHOPAEDIC SURGERY

## 2021-04-26 PROCEDURE — 20600 SMALL JOINT ASPIRATION/INJECTION: L RING PIP: ICD-10-PCS | Mod: S$PBB,79,51,F3 | Performed by: ORTHOPAEDIC SURGERY

## 2021-04-26 PROCEDURE — 99999 PR PBB SHADOW E&M-EST. PATIENT-LVL III: ICD-10-PCS | Mod: PBBFAC,,, | Performed by: ORTHOPAEDIC SURGERY

## 2021-04-26 PROCEDURE — 99213 PR OFFICE/OUTPT VISIT, EST, LEVL III, 20-29 MIN: ICD-10-PCS | Mod: S$PBB,24,25, | Performed by: ORTHOPAEDIC SURGERY

## 2021-04-26 PROCEDURE — 99024 PR POST-OP FOLLOW-UP VISIT: ICD-10-PCS | Mod: POP,,, | Performed by: ORTHOPAEDIC SURGERY

## 2021-04-26 RX ADMIN — METHYLPREDNISOLONE ACETATE 40 MG: 40 INJECTION, SUSPENSION INTRALESIONAL; INTRAMUSCULAR; INTRASYNOVIAL; SOFT TISSUE at 02:04

## 2021-04-28 RX ORDER — METHYLPREDNISOLONE ACETATE 40 MG/ML
40 INJECTION, SUSPENSION INTRA-ARTICULAR; INTRALESIONAL; INTRAMUSCULAR; SOFT TISSUE
Status: DISCONTINUED | OUTPATIENT
Start: 2021-04-26 | End: 2021-04-28 | Stop reason: HOSPADM

## 2021-05-17 ENCOUNTER — TELEPHONE (OUTPATIENT)
Dept: OPTOMETRY | Facility: CLINIC | Age: 86
End: 2021-05-17

## 2021-05-31 ENCOUNTER — OFFICE VISIT (OUTPATIENT)
Dept: DERMATOLOGY | Facility: CLINIC | Age: 86
End: 2021-05-31
Payer: MEDICARE

## 2021-05-31 DIAGNOSIS — D22.9 NEVUS: ICD-10-CM

## 2021-05-31 DIAGNOSIS — L81.4 LENTIGO: ICD-10-CM

## 2021-05-31 DIAGNOSIS — Z85.828 PERSONAL HISTORY OF SKIN CANCER: ICD-10-CM

## 2021-05-31 DIAGNOSIS — L82.1 SK (SEBORRHEIC KERATOSIS): ICD-10-CM

## 2021-05-31 DIAGNOSIS — L57.0 AK (ACTINIC KERATOSIS): Primary | ICD-10-CM

## 2021-05-31 DIAGNOSIS — L90.5 SCAR: ICD-10-CM

## 2021-05-31 DIAGNOSIS — D18.01 CHERRY ANGIOMA: ICD-10-CM

## 2021-05-31 PROCEDURE — 99213 OFFICE O/P EST LOW 20 MIN: CPT | Mod: 25,S$PBB,, | Performed by: DERMATOLOGY

## 2021-05-31 PROCEDURE — 99999 PR PBB SHADOW E&M-EST. PATIENT-LVL III: CPT | Mod: PBBFAC,,, | Performed by: DERMATOLOGY

## 2021-05-31 PROCEDURE — 17000 PR DESTRUCTION(LASER SURGERY,CRYOSURGERY,CHEMOSURGERY),PREMALIGNANT LESIONS,FIRST LESION: ICD-10-PCS | Mod: S$PBB,,, | Performed by: DERMATOLOGY

## 2021-05-31 PROCEDURE — 99457 PR MONITORING, PHYSIOL PARAM, REMOTE, 1ST 20 MINS, PER MONTH: ICD-10-PCS | Mod: S$PBB,,, | Performed by: INTERNAL MEDICINE

## 2021-05-31 PROCEDURE — 17003 DESTRUCT PREMALG LES 2-14: CPT | Mod: PBBFAC,PO | Performed by: DERMATOLOGY

## 2021-05-31 PROCEDURE — 17000 DESTRUCT PREMALG LESION: CPT | Mod: S$PBB,,, | Performed by: DERMATOLOGY

## 2021-05-31 PROCEDURE — 99457 RPM TX MGMT 1ST 20 MIN: CPT | Mod: S$PBB,,, | Performed by: INTERNAL MEDICINE

## 2021-05-31 PROCEDURE — 17003 DESTRUCTION, PREMALIGNANT LESIONS; SECOND THROUGH 14 LESIONS: ICD-10-PCS | Mod: S$PBB,,, | Performed by: DERMATOLOGY

## 2021-05-31 PROCEDURE — 17003 DESTRUCT PREMALG LES 2-14: CPT | Mod: S$PBB,,, | Performed by: DERMATOLOGY

## 2021-05-31 PROCEDURE — 17000 DESTRUCT PREMALG LESION: CPT | Mod: PBBFAC,PO | Performed by: DERMATOLOGY

## 2021-05-31 PROCEDURE — 99213 PR OFFICE/OUTPT VISIT, EST, LEVL III, 20-29 MIN: ICD-10-PCS | Mod: 25,S$PBB,, | Performed by: DERMATOLOGY

## 2021-05-31 PROCEDURE — 99999 PR PBB SHADOW E&M-EST. PATIENT-LVL III: ICD-10-PCS | Mod: PBBFAC,,, | Performed by: DERMATOLOGY

## 2021-05-31 PROCEDURE — 99213 OFFICE O/P EST LOW 20 MIN: CPT | Mod: PBBFAC,PO | Performed by: DERMATOLOGY

## 2021-06-04 ENCOUNTER — LAB VISIT (OUTPATIENT)
Dept: LAB | Facility: HOSPITAL | Age: 86
End: 2021-06-04
Attending: INTERNAL MEDICINE
Payer: MEDICARE

## 2021-06-04 DIAGNOSIS — E78.5 HYPERLIPIDEMIA, UNSPECIFIED HYPERLIPIDEMIA TYPE: Chronic | ICD-10-CM

## 2021-06-04 DIAGNOSIS — I10 HYPERTENSION, ESSENTIAL: Chronic | ICD-10-CM

## 2021-06-04 DIAGNOSIS — N18.31 STAGE 3A CHRONIC KIDNEY DISEASE: ICD-10-CM

## 2021-06-04 LAB
ALBUMIN SERPL BCP-MCNC: 3.8 G/DL (ref 3.5–5.2)
ALP SERPL-CCNC: 65 U/L (ref 55–135)
ALT SERPL W/O P-5'-P-CCNC: 13 U/L (ref 10–44)
ANION GAP SERPL CALC-SCNC: 10 MMOL/L (ref 8–16)
AST SERPL-CCNC: 20 U/L (ref 10–40)
BILIRUB SERPL-MCNC: 1 MG/DL (ref 0.1–1)
BUN SERPL-MCNC: 30 MG/DL (ref 8–23)
CALCIUM SERPL-MCNC: 9.7 MG/DL (ref 8.7–10.5)
CHLORIDE SERPL-SCNC: 111 MMOL/L (ref 95–110)
CHOLEST SERPL-MCNC: 169 MG/DL (ref 120–199)
CHOLEST/HDLC SERPL: 3.4 {RATIO} (ref 2–5)
CO2 SERPL-SCNC: 23 MMOL/L (ref 23–29)
CREAT SERPL-MCNC: 0.9 MG/DL (ref 0.5–1.4)
ERYTHROCYTE [DISTWIDTH] IN BLOOD BY AUTOMATED COUNT: 12.4 % (ref 11.5–14.5)
EST. GFR  (AFRICAN AMERICAN): >60 ML/MIN/1.73 M^2
EST. GFR  (NON AFRICAN AMERICAN): 57.3 ML/MIN/1.73 M^2
GLUCOSE SERPL-MCNC: 89 MG/DL (ref 70–110)
HCT VFR BLD AUTO: 33.8 % (ref 37–48.5)
HDLC SERPL-MCNC: 49 MG/DL (ref 40–75)
HDLC SERPL: 29 % (ref 20–50)
HGB BLD-MCNC: 11.3 G/DL (ref 12–16)
LDLC SERPL CALC-MCNC: 84 MG/DL (ref 63–159)
MAGNESIUM SERPL-MCNC: 1.8 MG/DL (ref 1.6–2.6)
MCH RBC QN AUTO: 33 PG (ref 27–31)
MCHC RBC AUTO-ENTMCNC: 33.4 G/DL (ref 32–36)
MCV RBC AUTO: 99 FL (ref 82–98)
NONHDLC SERPL-MCNC: 120 MG/DL
PLATELET # BLD AUTO: 218 K/UL (ref 150–450)
PMV BLD AUTO: 9.4 FL (ref 9.2–12.9)
POTASSIUM SERPL-SCNC: 4.2 MMOL/L (ref 3.5–5.1)
PROT SERPL-MCNC: 6.4 G/DL (ref 6–8.4)
RBC # BLD AUTO: 3.42 M/UL (ref 4–5.4)
SODIUM SERPL-SCNC: 144 MMOL/L (ref 136–145)
TRIGL SERPL-MCNC: 180 MG/DL (ref 30–150)
WBC # BLD AUTO: 6.84 K/UL (ref 3.9–12.7)

## 2021-06-04 PROCEDURE — 80061 LIPID PANEL: CPT | Performed by: NURSE PRACTITIONER

## 2021-06-04 PROCEDURE — 36415 COLL VENOUS BLD VENIPUNCTURE: CPT | Mod: PO | Performed by: NURSE PRACTITIONER

## 2021-06-04 PROCEDURE — 80053 COMPREHEN METABOLIC PANEL: CPT | Performed by: NURSE PRACTITIONER

## 2021-06-04 PROCEDURE — 83735 ASSAY OF MAGNESIUM: CPT | Performed by: NURSE PRACTITIONER

## 2021-06-04 PROCEDURE — 85027 COMPLETE CBC AUTOMATED: CPT | Performed by: NURSE PRACTITIONER

## 2021-06-07 ENCOUNTER — PES CALL (OUTPATIENT)
Dept: ADMINISTRATIVE | Facility: CLINIC | Age: 86
End: 2021-06-07

## 2021-06-08 DIAGNOSIS — I10 HYPERTENSION, ESSENTIAL: Chronic | ICD-10-CM

## 2021-06-10 ENCOUNTER — PES CALL (OUTPATIENT)
Dept: ADMINISTRATIVE | Facility: CLINIC | Age: 86
End: 2021-06-10

## 2021-06-10 RX ORDER — LORAZEPAM 0.5 MG/1
TABLET ORAL
Qty: 30 TABLET | Refills: 0 | Status: SHIPPED | OUTPATIENT
Start: 2021-06-10 | End: 2021-07-16

## 2021-06-11 ENCOUNTER — OFFICE VISIT (OUTPATIENT)
Dept: CARDIOLOGY | Facility: CLINIC | Age: 86
End: 2021-06-11
Payer: MEDICARE

## 2021-06-11 VITALS
BODY MASS INDEX: 24.22 KG/M2 | WEIGHT: 141.88 LBS | SYSTOLIC BLOOD PRESSURE: 168 MMHG | HEIGHT: 64 IN | DIASTOLIC BLOOD PRESSURE: 70 MMHG | HEART RATE: 64 BPM

## 2021-06-11 DIAGNOSIS — D64.9 ANEMIA, UNSPECIFIED TYPE: ICD-10-CM

## 2021-06-11 DIAGNOSIS — I77.1 SUBCLAVIAN ARTERY STENOSIS, RIGHT: ICD-10-CM

## 2021-06-11 DIAGNOSIS — I65.23 BILATERAL CAROTID ARTERY STENOSIS: ICD-10-CM

## 2021-06-11 DIAGNOSIS — I10 HYPERTENSION, ESSENTIAL: Primary | ICD-10-CM

## 2021-06-11 DIAGNOSIS — E78.00 PURE HYPERCHOLESTEROLEMIA: ICD-10-CM

## 2021-06-11 DIAGNOSIS — R00.1 SINUS BRADYCARDIA: ICD-10-CM

## 2021-06-11 PROCEDURE — 99214 PR OFFICE/OUTPT VISIT, EST, LEVL IV, 30-39 MIN: ICD-10-PCS | Mod: S$PBB,,, | Performed by: INTERNAL MEDICINE

## 2021-06-11 PROCEDURE — 93005 ELECTROCARDIOGRAM TRACING: CPT | Mod: PBBFAC,PO | Performed by: INTERNAL MEDICINE

## 2021-06-11 PROCEDURE — 99999 PR PBB SHADOW E&M-EST. PATIENT-LVL IV: CPT | Mod: PBBFAC,,, | Performed by: INTERNAL MEDICINE

## 2021-06-11 PROCEDURE — 99999 PR PBB SHADOW E&M-EST. PATIENT-LVL IV: ICD-10-PCS | Mod: PBBFAC,,, | Performed by: INTERNAL MEDICINE

## 2021-06-11 PROCEDURE — 99214 OFFICE O/P EST MOD 30 MIN: CPT | Mod: S$PBB,,, | Performed by: INTERNAL MEDICINE

## 2021-06-11 PROCEDURE — 93010 ELECTROCARDIOGRAM REPORT: CPT | Mod: S$PBB,,, | Performed by: INTERNAL MEDICINE

## 2021-06-11 PROCEDURE — 99214 OFFICE O/P EST MOD 30 MIN: CPT | Mod: PBBFAC,PO,25 | Performed by: INTERNAL MEDICINE

## 2021-06-11 PROCEDURE — 93010 EKG 12-LEAD: ICD-10-PCS | Mod: S$PBB,,, | Performed by: INTERNAL MEDICINE

## 2021-06-11 RX ORDER — ASPIRIN 81 MG/1
81 TABLET ORAL DAILY
Qty: 30 TABLET | Refills: 0 | Status: SHIPPED | OUTPATIENT
Start: 2021-06-11 | End: 2021-07-05

## 2021-06-21 ENCOUNTER — HOSPITAL ENCOUNTER (OUTPATIENT)
Dept: CARDIOLOGY | Facility: HOSPITAL | Age: 86
Discharge: HOME | End: 2021-06-21
Attending: INTERNAL MEDICINE
Payer: MEDICARE

## 2021-06-21 DIAGNOSIS — I65.23 BILATERAL CAROTID ARTERY STENOSIS: ICD-10-CM

## 2021-06-21 LAB
LEFT ARM DIASTOLIC BLOOD PRESSURE: 80 MMHG
LEFT ARM SYSTOLIC BLOOD PRESSURE: 150 MMHG
LEFT CBA DIAS: 16 CM/S
LEFT CBA SYS: 191 CM/S
LEFT CCA DIST DIAS: 11 CM/S
LEFT CCA DIST SYS: 90 CM/S
LEFT CCA MID DIAS: 11 CM/S
LEFT CCA MID SYS: 99 CM/S
LEFT CCA PROX DIAS: 11 CM/S
LEFT CCA PROX SYS: 108 CM/S
LEFT ECA DIAS: 26 CM/S
LEFT ECA SYS: 320 CM/S
LEFT ICA DIST DIAS: 27 CM/S
LEFT ICA DIST SYS: 198 CM/S
LEFT ICA MID DIAS: 27 CM/S
LEFT ICA MID SYS: 296 CM/S
LEFT ICA PROX DIAS: 24 CM/S
LEFT ICA PROX SYS: 218 CM/S
LEFT VERTEBRAL DIAS: 9 CM/S
LEFT VERTEBRAL SYS: 54 CM/S
OHS CV CAROTID RIGHT ICA EDV HIGHEST: 32
OHS CV CAROTID ULTRASOUND LEFT ICA/CCA RATIO: 3.29
OHS CV CAROTID ULTRASOUND RIGHT ICA/CCA RATIO: 3.23
OHS CV PV CAROTID LEFT HIGHEST CCA: 108
OHS CV PV CAROTID LEFT HIGHEST ICA: 296
OHS CV PV CAROTID RIGHT HIGHEST CCA: 109
OHS CV PV CAROTID RIGHT HIGHEST ICA: 265
OHS CV US CAROTID LEFT HIGHEST EDV: 27
RIGHT CBA DIAS: 18 CM/S
RIGHT CBA SYS: 293 CM/S
RIGHT CCA DIST DIAS: 10 CM/S
RIGHT CCA DIST SYS: 82 CM/S
RIGHT CCA MID DIAS: 12 CM/S
RIGHT CCA MID SYS: 104 CM/S
RIGHT CCA PROX DIAS: 7 CM/S
RIGHT CCA PROX SYS: 109 CM/S
RIGHT ECA DIAS: 37 CM/S
RIGHT ECA SYS: 403 CM/S
RIGHT ICA DIST DIAS: 10 CM/S
RIGHT ICA DIST SYS: 120 CM/S
RIGHT ICA MID DIAS: 12 CM/S
RIGHT ICA MID SYS: 211 CM/S
RIGHT ICA PROX DIAS: 32 CM/S
RIGHT ICA PROX SYS: 265 CM/S
RIGHT VERTEBRAL DIAS: 15 CM/S
RIGHT VERTEBRAL SYS: 85 CM/S

## 2021-06-21 PROCEDURE — 93880 EXTRACRANIAL BILAT STUDY: CPT | Mod: 26,,, | Performed by: INTERNAL MEDICINE

## 2021-06-21 PROCEDURE — 93880 EXTRACRANIAL BILAT STUDY: CPT

## 2021-06-21 PROCEDURE — 93880 CV US DOPPLER CAROTID (CUPID ONLY): ICD-10-PCS | Mod: 26,,, | Performed by: INTERNAL MEDICINE

## 2021-06-28 ENCOUNTER — OFFICE VISIT (OUTPATIENT)
Dept: SURGERY | Facility: CLINIC | Age: 86
End: 2021-06-28
Payer: MEDICARE

## 2021-06-28 VITALS
BODY MASS INDEX: 24.07 KG/M2 | HEIGHT: 64 IN | SYSTOLIC BLOOD PRESSURE: 202 MMHG | WEIGHT: 141 LBS | HEART RATE: 58 BPM | DIASTOLIC BLOOD PRESSURE: 84 MMHG

## 2021-06-28 DIAGNOSIS — Z85.3 PERSONAL HISTORY OF BREAST CANCER: Primary | ICD-10-CM

## 2021-06-28 PROCEDURE — 99213 OFFICE O/P EST LOW 20 MIN: CPT | Mod: S$PBB,,, | Performed by: SURGERY

## 2021-06-28 PROCEDURE — 99999 PR PBB SHADOW E&M-EST. PATIENT-LVL III: ICD-10-PCS | Mod: PBBFAC,,, | Performed by: SURGERY

## 2021-06-28 PROCEDURE — 99213 PR OFFICE/OUTPT VISIT, EST, LEVL III, 20-29 MIN: ICD-10-PCS | Mod: S$PBB,,, | Performed by: SURGERY

## 2021-06-28 PROCEDURE — 99213 OFFICE O/P EST LOW 20 MIN: CPT | Mod: PBBFAC | Performed by: SURGERY

## 2021-06-28 PROCEDURE — 99999 PR PBB SHADOW E&M-EST. PATIENT-LVL III: CPT | Mod: PBBFAC,,, | Performed by: SURGERY

## 2021-07-01 ENCOUNTER — PATIENT OUTREACH (OUTPATIENT)
Dept: ADMINISTRATIVE | Facility: OTHER | Age: 86
End: 2021-07-01

## 2021-07-02 ENCOUNTER — OFFICE VISIT (OUTPATIENT)
Dept: OPTOMETRY | Facility: CLINIC | Age: 86
End: 2021-07-02
Payer: MEDICARE

## 2021-07-02 DIAGNOSIS — H52.4 PRESBYOPIA: ICD-10-CM

## 2021-07-02 DIAGNOSIS — H04.123 DRY EYES, BILATERAL: Primary | ICD-10-CM

## 2021-07-02 DIAGNOSIS — Z13.5 SCREENING FOR GLAUCOMA: ICD-10-CM

## 2021-07-02 PROCEDURE — 92015 PR REFRACTION: ICD-10-PCS | Mod: ,,, | Performed by: OPTOMETRIST

## 2021-07-02 PROCEDURE — 92015 DETERMINE REFRACTIVE STATE: CPT | Mod: ,,, | Performed by: OPTOMETRIST

## 2021-07-02 PROCEDURE — 99999 PR PBB SHADOW E&M-EST. PATIENT-LVL III: ICD-10-PCS | Mod: PBBFAC,,, | Performed by: OPTOMETRIST

## 2021-07-02 PROCEDURE — 99999 PR PBB SHADOW E&M-EST. PATIENT-LVL III: CPT | Mod: PBBFAC,,, | Performed by: OPTOMETRIST

## 2021-07-02 PROCEDURE — 92014 COMPRE OPH EXAM EST PT 1/>: CPT | Mod: S$PBB,,, | Performed by: OPTOMETRIST

## 2021-07-02 PROCEDURE — 99213 OFFICE O/P EST LOW 20 MIN: CPT | Mod: PBBFAC,PO | Performed by: OPTOMETRIST

## 2021-07-02 PROCEDURE — 92014 PR EYE EXAM, EST PATIENT,COMPREHESV: ICD-10-PCS | Mod: S$PBB,,, | Performed by: OPTOMETRIST

## 2021-07-12 ENCOUNTER — CLINICAL SUPPORT (OUTPATIENT)
Dept: AUDIOLOGY | Facility: CLINIC | Age: 86
End: 2021-07-12
Payer: MEDICARE

## 2021-07-12 ENCOUNTER — OFFICE VISIT (OUTPATIENT)
Dept: OTOLARYNGOLOGY | Facility: CLINIC | Age: 86
End: 2021-07-12
Payer: MEDICARE

## 2021-07-12 VITALS — SYSTOLIC BLOOD PRESSURE: 155 MMHG | DIASTOLIC BLOOD PRESSURE: 68 MMHG | HEART RATE: 60 BPM

## 2021-07-12 DIAGNOSIS — H91.90 PERCEIVED HEARING LOSS: ICD-10-CM

## 2021-07-12 DIAGNOSIS — H61.22 IMPACTED CERUMEN, LEFT EAR: ICD-10-CM

## 2021-07-12 DIAGNOSIS — Z97.4 WEARS HEARING AID IN BOTH EARS: ICD-10-CM

## 2021-07-12 DIAGNOSIS — Z86.69 HISTORY OF HEARING LOSS: ICD-10-CM

## 2021-07-12 DIAGNOSIS — H91.90 PERCEIVED HEARING LOSS: Primary | ICD-10-CM

## 2021-07-12 DIAGNOSIS — H90.3 SENSORINEURAL HEARING LOSS, BILATERAL: Primary | ICD-10-CM

## 2021-07-12 PROCEDURE — 99211 OFF/OP EST MAY X REQ PHY/QHP: CPT | Mod: PBBFAC

## 2021-07-12 PROCEDURE — 92567 TYMPANOMETRY: CPT | Mod: PBBFAC | Performed by: AUDIOLOGIST

## 2021-07-12 PROCEDURE — 92557 COMPREHENSIVE HEARING TEST: CPT | Mod: PBBFAC | Performed by: AUDIOLOGIST

## 2021-07-12 PROCEDURE — 69210 REMOVE IMPACTED EAR WAX UNI: CPT | Mod: S$PBB,,, | Performed by: OTOLARYNGOLOGY

## 2021-07-12 PROCEDURE — 99999 PR PBB SHADOW E&M-EST. PATIENT-LVL IV: CPT | Mod: PBBFAC,,, | Performed by: OTOLARYNGOLOGY

## 2021-07-12 PROCEDURE — 99214 OFFICE O/P EST MOD 30 MIN: CPT | Mod: PBBFAC,27 | Performed by: OTOLARYNGOLOGY

## 2021-07-12 PROCEDURE — 69210 REMOVE IMPACTED EAR WAX UNI: CPT | Mod: PBBFAC | Performed by: OTOLARYNGOLOGY

## 2021-07-12 PROCEDURE — 99999 PR PBB SHADOW E&M-EST. PATIENT-LVL I: CPT | Mod: PBBFAC,,,

## 2021-07-12 PROCEDURE — 69210 PR REMOVAL IMPACTED CERUMEN REQUIRING INSTRUMENTATION, UNILATERAL: ICD-10-PCS | Mod: S$PBB,,, | Performed by: OTOLARYNGOLOGY

## 2021-07-12 PROCEDURE — 99999 PR PBB SHADOW E&M-EST. PATIENT-LVL IV: ICD-10-PCS | Mod: PBBFAC,,, | Performed by: OTOLARYNGOLOGY

## 2021-07-12 PROCEDURE — 99999 PR PBB SHADOW E&M-EST. PATIENT-LVL I: ICD-10-PCS | Mod: PBBFAC,,,

## 2021-07-12 PROCEDURE — 99213 OFFICE O/P EST LOW 20 MIN: CPT | Mod: 25,S$PBB,, | Performed by: OTOLARYNGOLOGY

## 2021-07-12 PROCEDURE — 99213 PR OFFICE/OUTPT VISIT, EST, LEVL III, 20-29 MIN: ICD-10-PCS | Mod: 25,S$PBB,, | Performed by: OTOLARYNGOLOGY

## 2021-07-16 DIAGNOSIS — I10 HYPERTENSION, ESSENTIAL: Chronic | ICD-10-CM

## 2021-07-16 RX ORDER — LORAZEPAM 0.5 MG/1
TABLET ORAL
Qty: 30 TABLET | Refills: 0 | Status: SHIPPED | OUTPATIENT
Start: 2021-07-16 | End: 2021-09-14

## 2021-07-19 ENCOUNTER — OFFICE VISIT (OUTPATIENT)
Dept: INTERNAL MEDICINE | Facility: CLINIC | Age: 86
End: 2021-07-19
Payer: MEDICARE

## 2021-07-19 ENCOUNTER — TELEPHONE (OUTPATIENT)
Dept: ORTHOPEDICS | Facility: CLINIC | Age: 86
End: 2021-07-19

## 2021-07-19 VITALS
HEIGHT: 63 IN | OXYGEN SATURATION: 97 % | WEIGHT: 138 LBS | DIASTOLIC BLOOD PRESSURE: 48 MMHG | SYSTOLIC BLOOD PRESSURE: 112 MMHG | BODY MASS INDEX: 24.45 KG/M2 | RESPIRATION RATE: 15 BRPM | HEART RATE: 67 BPM

## 2021-07-19 DIAGNOSIS — M15.9 OSTEOARTHRITIS OF MULTIPLE JOINTS, UNSPECIFIED OSTEOARTHRITIS TYPE: ICD-10-CM

## 2021-07-19 DIAGNOSIS — I65.23 BILATERAL CAROTID ARTERY STENOSIS: ICD-10-CM

## 2021-07-19 DIAGNOSIS — Z74.09 IMPAIRED FUNCTIONAL MOBILITY, BALANCE, GAIT, AND ENDURANCE: ICD-10-CM

## 2021-07-19 DIAGNOSIS — M47.812 CERVICAL SPONDYLOSIS: ICD-10-CM

## 2021-07-19 DIAGNOSIS — M75.101 RIGHT ROTATOR CUFF TEAR ARTHROPATHY: ICD-10-CM

## 2021-07-19 DIAGNOSIS — D53.9 MACROCYTIC ANEMIA: ICD-10-CM

## 2021-07-19 DIAGNOSIS — H04.123 BILATERAL DRY EYES: ICD-10-CM

## 2021-07-19 DIAGNOSIS — H26.40 SECONDARY CATARACT, UNSPECIFIED LATERALITY, UNSPECIFIED SECONDARY CATARACT TYPE: ICD-10-CM

## 2021-07-19 DIAGNOSIS — M16.12 OSTEOARTHRITIS OF LEFT HIP, UNSPECIFIED OSTEOARTHRITIS TYPE: ICD-10-CM

## 2021-07-19 DIAGNOSIS — S46.811S TEAR OF RIGHT INFRASPINATUS TENDON, SEQUELA: ICD-10-CM

## 2021-07-19 DIAGNOSIS — M85.80 OSTEOPENIA, UNSPECIFIED LOCATION: ICD-10-CM

## 2021-07-19 DIAGNOSIS — M19.041 OSTEOARTHRITIS OF BOTH HANDS, UNSPECIFIED OSTEOARTHRITIS TYPE: ICD-10-CM

## 2021-07-19 DIAGNOSIS — M17.12 PRIMARY OSTEOARTHRITIS OF LEFT KNEE: ICD-10-CM

## 2021-07-19 DIAGNOSIS — R54 FRAIL ELDERLY: ICD-10-CM

## 2021-07-19 DIAGNOSIS — M70.62 TROCHANTERIC BURSITIS, LEFT HIP: ICD-10-CM

## 2021-07-19 DIAGNOSIS — N18.30 STAGE 3 CHRONIC KIDNEY DISEASE, UNSPECIFIED WHETHER STAGE 3A OR 3B CKD: Chronic | ICD-10-CM

## 2021-07-19 DIAGNOSIS — M40.04 POSTURAL KYPHOSIS OF THORACIC REGION: ICD-10-CM

## 2021-07-19 DIAGNOSIS — M75.102 ROTATOR CUFF TEAR ARTHROPATHY, LEFT: ICD-10-CM

## 2021-07-19 DIAGNOSIS — G89.29 CHRONIC RIGHT SHOULDER PAIN: ICD-10-CM

## 2021-07-19 DIAGNOSIS — I10 HYPERTENSION, ESSENTIAL: Chronic | ICD-10-CM

## 2021-07-19 DIAGNOSIS — M19.042 OSTEOARTHRITIS OF BOTH HANDS, UNSPECIFIED OSTEOARTHRITIS TYPE: ICD-10-CM

## 2021-07-19 DIAGNOSIS — M12.812 ROTATOR CUFF TEAR ARTHROPATHY, LEFT: ICD-10-CM

## 2021-07-19 DIAGNOSIS — E78.5 HYPERLIPIDEMIA, UNSPECIFIED HYPERLIPIDEMIA TYPE: Chronic | ICD-10-CM

## 2021-07-19 DIAGNOSIS — F41.9 ANXIETY: Chronic | ICD-10-CM

## 2021-07-19 DIAGNOSIS — R79.9 ABNORMAL BLOOD CHEMISTRY TEST: ICD-10-CM

## 2021-07-19 DIAGNOSIS — I70.0 AORTIC ATHEROSCLEROSIS: Chronic | ICD-10-CM

## 2021-07-19 DIAGNOSIS — M19.071 OSTEOARTHRITIS OF BOTH FEET, UNSPECIFIED OSTEOARTHRITIS TYPE: ICD-10-CM

## 2021-07-19 DIAGNOSIS — R20.2 PARESTHESIA OF LEFT LEG: ICD-10-CM

## 2021-07-19 DIAGNOSIS — Z96.641 HISTORY OF TOTAL RIGHT HIP REPLACEMENT: ICD-10-CM

## 2021-07-19 DIAGNOSIS — Z91.81 RISK FOR FALLS: ICD-10-CM

## 2021-07-19 DIAGNOSIS — M54.17 LUMBOSACRAL RADICULOPATHY AT L5: ICD-10-CM

## 2021-07-19 DIAGNOSIS — M79.10 MYALGIA: ICD-10-CM

## 2021-07-19 DIAGNOSIS — M12.811 RIGHT ROTATOR CUFF TEAR ARTHROPATHY: ICD-10-CM

## 2021-07-19 DIAGNOSIS — Z85.828 PERSONAL HISTORY OF SKIN CANCER: ICD-10-CM

## 2021-07-19 DIAGNOSIS — M70.61 GREATER TROCHANTERIC BURSITIS OF RIGHT HIP: ICD-10-CM

## 2021-07-19 DIAGNOSIS — M47.26 OTHER SPONDYLOSIS WITH RADICULOPATHY, LUMBAR REGION: ICD-10-CM

## 2021-07-19 DIAGNOSIS — H90.3 SENSORINEURAL HEARING LOSS (SNHL) OF BOTH EARS: ICD-10-CM

## 2021-07-19 DIAGNOSIS — M25.619 DECREASED RANGE OF MOTION OF SHOULDER, UNSPECIFIED LATERALITY: ICD-10-CM

## 2021-07-19 DIAGNOSIS — Z00.00 ENCOUNTER FOR PREVENTIVE HEALTH EXAMINATION: Primary | ICD-10-CM

## 2021-07-19 DIAGNOSIS — M19.072 OSTEOARTHRITIS OF BOTH FEET, UNSPECIFIED OSTEOARTHRITIS TYPE: ICD-10-CM

## 2021-07-19 DIAGNOSIS — M25.511 CHRONIC RIGHT SHOULDER PAIN: ICD-10-CM

## 2021-07-19 DIAGNOSIS — M17.11 PRIMARY OSTEOARTHRITIS OF RIGHT KNEE: ICD-10-CM

## 2021-07-19 DIAGNOSIS — Z85.3 PERSONAL HISTORY OF BREAST CANCER: ICD-10-CM

## 2021-07-19 DIAGNOSIS — I77.1 SUBCLAVIAN ARTERY STENOSIS, RIGHT: ICD-10-CM

## 2021-07-19 DIAGNOSIS — Z96.652 STATUS POST TOTAL LEFT KNEE REPLACEMENT: ICD-10-CM

## 2021-07-19 PROBLEM — S09.90XA HEAD TRAUMA: Status: RESOLVED | Noted: 2017-03-07 | Resolved: 2021-07-19

## 2021-07-19 PROCEDURE — 99999 PR PBB SHADOW E&M-EST. PATIENT-LVL V: CPT | Mod: PBBFAC,,, | Performed by: NURSE PRACTITIONER

## 2021-07-19 PROCEDURE — 99999 PR PBB SHADOW E&M-EST. PATIENT-LVL V: ICD-10-PCS | Mod: PBBFAC,,, | Performed by: NURSE PRACTITIONER

## 2021-07-19 PROCEDURE — G0439 PR MEDICARE ANNUAL WELLNESS SUBSEQUENT VISIT: ICD-10-PCS | Mod: ,,, | Performed by: NURSE PRACTITIONER

## 2021-07-19 PROCEDURE — 99215 OFFICE O/P EST HI 40 MIN: CPT | Mod: PBBFAC,PO | Performed by: NURSE PRACTITIONER

## 2021-07-19 PROCEDURE — G0439 PPPS, SUBSEQ VISIT: HCPCS | Mod: ,,, | Performed by: NURSE PRACTITIONER

## 2021-07-23 ENCOUNTER — TELEPHONE (OUTPATIENT)
Dept: CARDIOLOGY | Facility: CLINIC | Age: 86
End: 2021-07-23

## 2021-07-27 ENCOUNTER — OFFICE VISIT (OUTPATIENT)
Dept: ORTHOPEDICS | Facility: CLINIC | Age: 86
End: 2021-07-27
Payer: MEDICARE

## 2021-07-27 ENCOUNTER — HOSPITAL ENCOUNTER (OUTPATIENT)
Dept: RADIOLOGY | Facility: HOSPITAL | Age: 86
Discharge: HOME | End: 2021-07-27
Attending: ORTHOPAEDIC SURGERY
Payer: MEDICARE

## 2021-07-27 VITALS — HEIGHT: 63 IN | BODY MASS INDEX: 24.91 KG/M2 | WEIGHT: 140.56 LBS

## 2021-07-27 DIAGNOSIS — Z96.652 STATUS POST LEFT KNEE REPLACEMENT: Primary | ICD-10-CM

## 2021-07-27 DIAGNOSIS — M17.11 PRIMARY OSTEOARTHRITIS OF RIGHT KNEE: Primary | ICD-10-CM

## 2021-07-27 DIAGNOSIS — M17.12 PRIMARY OSTEOARTHRITIS OF LEFT KNEE: ICD-10-CM

## 2021-07-27 DIAGNOSIS — M25.551 HIP PAIN, RIGHT: ICD-10-CM

## 2021-07-27 DIAGNOSIS — M25.551 HIP PAIN, RIGHT: Primary | ICD-10-CM

## 2021-07-27 DIAGNOSIS — M25.561 RIGHT KNEE PAIN, UNSPECIFIED CHRONICITY: ICD-10-CM

## 2021-07-27 DIAGNOSIS — Z96.652 STATUS POST LEFT KNEE REPLACEMENT: ICD-10-CM

## 2021-07-27 PROCEDURE — 20610 PR DRAIN/INJECT LARGE JOINT/BURSA: ICD-10-PCS | Mod: S$PBB,RT,, | Performed by: ORTHOPAEDIC SURGERY

## 2021-07-27 PROCEDURE — 73562 XR KNEE ORTHO BILAT: ICD-10-PCS | Mod: 26,50,, | Performed by: RADIOLOGY

## 2021-07-27 PROCEDURE — 99213 OFFICE O/P EST LOW 20 MIN: CPT | Mod: PBBFAC,25 | Performed by: ORTHOPAEDIC SURGERY

## 2021-07-27 PROCEDURE — 99213 OFFICE O/P EST LOW 20 MIN: CPT | Mod: 25,S$PBB,, | Performed by: ORTHOPAEDIC SURGERY

## 2021-07-27 PROCEDURE — 99999 PR PBB SHADOW E&M-EST. PATIENT-LVL III: ICD-10-PCS | Mod: PBBFAC,,, | Performed by: ORTHOPAEDIC SURGERY

## 2021-07-27 PROCEDURE — 99999 PR PBB SHADOW E&M-EST. PATIENT-LVL III: CPT | Mod: PBBFAC,,, | Performed by: ORTHOPAEDIC SURGERY

## 2021-07-27 PROCEDURE — 20610 DRAIN/INJ JOINT/BURSA W/O US: CPT | Mod: S$PBB,RT,, | Performed by: ORTHOPAEDIC SURGERY

## 2021-07-27 PROCEDURE — 73562 X-RAY EXAM OF KNEE 3: CPT | Mod: 26,50,, | Performed by: RADIOLOGY

## 2021-07-27 PROCEDURE — 20610 DRAIN/INJ JOINT/BURSA W/O US: CPT | Mod: PBBFAC,RT | Performed by: ORTHOPAEDIC SURGERY

## 2021-07-27 PROCEDURE — 72170 XR PELVIS ROUTINE AP: ICD-10-PCS | Mod: 26,,, | Performed by: RADIOLOGY

## 2021-07-27 PROCEDURE — 72170 X-RAY EXAM OF PELVIS: CPT | Mod: TC

## 2021-07-27 PROCEDURE — 73562 X-RAY EXAM OF KNEE 3: CPT | Mod: TC,50

## 2021-07-27 PROCEDURE — 72170 X-RAY EXAM OF PELVIS: CPT | Mod: 26,,, | Performed by: RADIOLOGY

## 2021-07-27 PROCEDURE — 99213 PR OFFICE/OUTPT VISIT, EST, LEVL III, 20-29 MIN: ICD-10-PCS | Mod: 25,S$PBB,, | Performed by: ORTHOPAEDIC SURGERY

## 2021-07-27 RX ADMIN — TRIAMCINOLONE ACETONIDE 40 MG: 40 INJECTION, SUSPENSION INTRA-ARTICULAR; INTRAMUSCULAR at 02:07

## 2021-07-29 RX ORDER — TRIAMCINOLONE ACETONIDE 40 MG/ML
40 INJECTION, SUSPENSION INTRA-ARTICULAR; INTRAMUSCULAR
Status: DISCONTINUED | OUTPATIENT
Start: 2021-07-27 | End: 2021-07-29 | Stop reason: HOSPADM

## 2021-08-17 ENCOUNTER — HOSPITAL ENCOUNTER (OUTPATIENT)
Dept: RADIOLOGY | Facility: HOSPITAL | Age: 86
Discharge: HOME | End: 2021-08-17
Attending: INTERNAL MEDICINE
Payer: MEDICARE

## 2021-08-17 ENCOUNTER — OFFICE VISIT (OUTPATIENT)
Dept: INTERNAL MEDICINE | Facility: CLINIC | Age: 86
End: 2021-08-17
Payer: MEDICARE

## 2021-08-17 VITALS
DIASTOLIC BLOOD PRESSURE: 64 MMHG | TEMPERATURE: 98 F | SYSTOLIC BLOOD PRESSURE: 136 MMHG | BODY MASS INDEX: 25.92 KG/M2 | HEIGHT: 62 IN | WEIGHT: 140.88 LBS | OXYGEN SATURATION: 97 % | RESPIRATION RATE: 18 BRPM | HEART RATE: 61 BPM

## 2021-08-17 DIAGNOSIS — M54.50 ACUTE LEFT-SIDED LOW BACK PAIN WITHOUT SCIATICA: ICD-10-CM

## 2021-08-17 DIAGNOSIS — M54.50 ACUTE LEFT-SIDED LOW BACK PAIN WITHOUT SCIATICA: Primary | ICD-10-CM

## 2021-08-17 PROCEDURE — 72100 X-RAY EXAM L-S SPINE 2/3 VWS: CPT | Mod: TC,PO

## 2021-08-17 PROCEDURE — 99999 PR PBB SHADOW E&M-EST. PATIENT-LVL V: CPT | Mod: PBBFAC,,, | Performed by: INTERNAL MEDICINE

## 2021-08-17 PROCEDURE — 99999 PR PBB SHADOW E&M-EST. PATIENT-LVL V: ICD-10-PCS | Mod: PBBFAC,,, | Performed by: INTERNAL MEDICINE

## 2021-08-17 PROCEDURE — 99215 OFFICE O/P EST HI 40 MIN: CPT | Mod: PBBFAC,PO | Performed by: INTERNAL MEDICINE

## 2021-08-17 PROCEDURE — 99214 OFFICE O/P EST MOD 30 MIN: CPT | Mod: S$PBB,,, | Performed by: INTERNAL MEDICINE

## 2021-08-17 PROCEDURE — 99214 PR OFFICE/OUTPT VISIT, EST, LEVL IV, 30-39 MIN: ICD-10-PCS | Mod: S$PBB,,, | Performed by: INTERNAL MEDICINE

## 2021-08-17 PROCEDURE — 72100 XR LUMBAR SPINE AP AND LATERAL: ICD-10-PCS | Mod: 26,,, | Performed by: RADIOLOGY

## 2021-08-17 PROCEDURE — 72100 X-RAY EXAM L-S SPINE 2/3 VWS: CPT | Mod: 26,,, | Performed by: RADIOLOGY

## 2021-08-17 RX ORDER — TIZANIDINE 4 MG/1
4 TABLET ORAL EVERY 8 HOURS PRN
Qty: 50 TABLET | Refills: 0 | Status: SHIPPED | OUTPATIENT
Start: 2021-08-17 | End: 2021-11-26

## 2021-08-17 RX ORDER — LIDOCAINE 50 MG/G
1 PATCH TOPICAL DAILY
Qty: 30 PATCH | Refills: 0 | Status: SHIPPED | OUTPATIENT
Start: 2021-08-17 | End: 2022-06-21

## 2021-08-20 ENCOUNTER — CLINICAL SUPPORT (OUTPATIENT)
Dept: REHABILITATION | Facility: HOSPITAL | Age: 86
End: 2021-08-20
Attending: INTERNAL MEDICINE
Payer: MEDICARE

## 2021-08-20 DIAGNOSIS — M54.50 ACUTE LEFT-SIDED LOW BACK PAIN WITHOUT SCIATICA: ICD-10-CM

## 2021-08-20 DIAGNOSIS — Z74.09 IMPAIRED FUNCTIONAL MOBILITY, BALANCE, AND ENDURANCE: ICD-10-CM

## 2021-08-20 PROCEDURE — 97162 PT EVAL MOD COMPLEX 30 MIN: CPT | Mod: PO

## 2021-08-22 DIAGNOSIS — I65.23 BILATERAL CAROTID ARTERY STENOSIS: Primary | ICD-10-CM

## 2021-08-22 DIAGNOSIS — I77.1 SUBCLAVIAN ARTERY STENOSIS, RIGHT: ICD-10-CM

## 2021-08-23 ENCOUNTER — CLINICAL SUPPORT (OUTPATIENT)
Dept: REHABILITATION | Facility: HOSPITAL | Age: 86
End: 2021-08-23
Attending: INTERNAL MEDICINE
Payer: MEDICARE

## 2021-08-23 DIAGNOSIS — Z74.09 IMPAIRED FUNCTIONAL MOBILITY, BALANCE, AND ENDURANCE: ICD-10-CM

## 2021-08-23 PROCEDURE — 97110 THERAPEUTIC EXERCISES: CPT | Mod: PO,CQ

## 2021-08-25 ENCOUNTER — CLINICAL SUPPORT (OUTPATIENT)
Dept: REHABILITATION | Facility: HOSPITAL | Age: 86
End: 2021-08-25
Attending: INTERNAL MEDICINE
Payer: MEDICARE

## 2021-08-25 DIAGNOSIS — M54.50 CHRONIC BILATERAL LOW BACK PAIN WITHOUT SCIATICA: ICD-10-CM

## 2021-08-25 DIAGNOSIS — G89.29 CHRONIC BILATERAL LOW BACK PAIN WITHOUT SCIATICA: ICD-10-CM

## 2021-08-25 DIAGNOSIS — Z74.09 IMPAIRED FUNCTIONAL MOBILITY, BALANCE, AND ENDURANCE: ICD-10-CM

## 2021-08-25 PROCEDURE — 97110 THERAPEUTIC EXERCISES: CPT | Mod: PO,CQ

## 2021-08-26 ENCOUNTER — DOCUMENTATION ONLY (OUTPATIENT)
Dept: REHABILITATION | Facility: HOSPITAL | Age: 86
End: 2021-08-26

## 2021-08-31 PROCEDURE — 99457 PR MONITORING, PHYSIOL PARAM, REMOTE, 1ST 20 MINS, PER MONTH: ICD-10-PCS | Mod: S$PBB,,, | Performed by: INTERNAL MEDICINE

## 2021-08-31 PROCEDURE — 99457 RPM TX MGMT 1ST 20 MIN: CPT | Mod: S$PBB,,, | Performed by: INTERNAL MEDICINE

## 2021-09-13 DIAGNOSIS — I10 HYPERTENSION, ESSENTIAL: Chronic | ICD-10-CM

## 2021-09-14 DIAGNOSIS — I10 HYPERTENSION, ESSENTIAL: Chronic | ICD-10-CM

## 2021-09-14 RX ORDER — LORAZEPAM 0.5 MG/1
TABLET ORAL
Qty: 30 TABLET | Refills: 0 | Status: SHIPPED | OUTPATIENT
Start: 2021-09-14 | End: 2021-09-14 | Stop reason: SDUPTHER

## 2021-09-14 RX ORDER — LORAZEPAM 0.5 MG/1
TABLET ORAL
Qty: 30 TABLET | Refills: 0 | Status: SHIPPED | OUTPATIENT
Start: 2021-09-14 | End: 2021-11-26

## 2021-09-18 ENCOUNTER — HOSPITAL ENCOUNTER (EMERGENCY)
Facility: HOSPITAL | Age: 86
Discharge: HOME | End: 2021-09-18
Attending: EMERGENCY MEDICINE
Payer: MEDICARE

## 2021-09-18 VITALS
DIASTOLIC BLOOD PRESSURE: 67 MMHG | OXYGEN SATURATION: 95 % | HEART RATE: 77 BPM | SYSTOLIC BLOOD PRESSURE: 154 MMHG | RESPIRATION RATE: 18 BRPM | TEMPERATURE: 98 F

## 2021-09-18 DIAGNOSIS — R19.7 DIARRHEA, UNSPECIFIED TYPE: Primary | ICD-10-CM

## 2021-09-18 DIAGNOSIS — E86.0 DEHYDRATION: ICD-10-CM

## 2021-09-18 LAB
ALBUMIN SERPL BCP-MCNC: 3.8 G/DL (ref 3.5–5.2)
ALP SERPL-CCNC: 71 U/L (ref 55–135)
ALT SERPL W/O P-5'-P-CCNC: 21 U/L (ref 10–44)
ANION GAP SERPL CALC-SCNC: 14 MMOL/L (ref 8–16)
AST SERPL-CCNC: 26 U/L (ref 10–40)
BASOPHILS # BLD AUTO: 0.01 K/UL (ref 0–0.2)
BASOPHILS NFR BLD: 0.1 % (ref 0–1.9)
BILIRUB SERPL-MCNC: 1.4 MG/DL (ref 0.1–1)
BUN SERPL-MCNC: 37 MG/DL (ref 8–23)
CALCIUM SERPL-MCNC: 9.2 MG/DL (ref 8.7–10.5)
CHLORIDE SERPL-SCNC: 106 MMOL/L (ref 95–110)
CO2 SERPL-SCNC: 17 MMOL/L (ref 23–29)
CREAT SERPL-MCNC: 1.1 MG/DL (ref 0.5–1.4)
DIFFERENTIAL METHOD: ABNORMAL
EOSINOPHIL # BLD AUTO: 0.1 K/UL (ref 0–0.5)
EOSINOPHIL NFR BLD: 0.8 % (ref 0–8)
ERYTHROCYTE [DISTWIDTH] IN BLOOD BY AUTOMATED COUNT: 12.2 % (ref 11.5–14.5)
EST. GFR  (AFRICAN AMERICAN): 51.8 ML/MIN/1.73 M^2
EST. GFR  (NON AFRICAN AMERICAN): 44.9 ML/MIN/1.73 M^2
GLUCOSE SERPL-MCNC: 135 MG/DL (ref 70–110)
HCT VFR BLD AUTO: 35.2 % (ref 37–48.5)
HGB BLD-MCNC: 11.7 G/DL (ref 12–16)
IMM GRANULOCYTES # BLD AUTO: 0.03 K/UL (ref 0–0.04)
IMM GRANULOCYTES NFR BLD AUTO: 0.3 % (ref 0–0.5)
LYMPHOCYTES # BLD AUTO: 0.4 K/UL (ref 1–4.8)
LYMPHOCYTES NFR BLD: 4.4 % (ref 18–48)
MCH RBC QN AUTO: 32.5 PG (ref 27–31)
MCHC RBC AUTO-ENTMCNC: 33.2 G/DL (ref 32–36)
MCV RBC AUTO: 98 FL (ref 82–98)
MONOCYTES # BLD AUTO: 0.5 K/UL (ref 0.3–1)
MONOCYTES NFR BLD: 5.6 % (ref 4–15)
NEUTROPHILS # BLD AUTO: 7.9 K/UL (ref 1.8–7.7)
NEUTROPHILS NFR BLD: 88.8 % (ref 38–73)
NRBC BLD-RTO: 0 /100 WBC
PLATELET # BLD AUTO: 207 K/UL (ref 150–450)
PMV BLD AUTO: 8.8 FL (ref 9.2–12.9)
POTASSIUM SERPL-SCNC: 3.8 MMOL/L (ref 3.5–5.1)
PROT SERPL-MCNC: 6.7 G/DL (ref 6–8.4)
RBC # BLD AUTO: 3.6 M/UL (ref 4–5.4)
SODIUM SERPL-SCNC: 137 MMOL/L (ref 136–145)
WBC # BLD AUTO: 8.91 K/UL (ref 3.9–12.7)

## 2021-09-18 PROCEDURE — 99284 EMERGENCY DEPT VISIT MOD MDM: CPT | Mod: 25

## 2021-09-18 PROCEDURE — 96374 THER/PROPH/DIAG INJ IV PUSH: CPT

## 2021-09-18 PROCEDURE — 96361 HYDRATE IV INFUSION ADD-ON: CPT

## 2021-09-18 PROCEDURE — 80053 COMPREHEN METABOLIC PANEL: CPT | Performed by: EMERGENCY MEDICINE

## 2021-09-18 PROCEDURE — 25000003 PHARM REV CODE 250: Performed by: EMERGENCY MEDICINE

## 2021-09-18 PROCEDURE — 99285 PR EMERGENCY DEPT VISIT,LEVEL V: ICD-10-PCS | Mod: ,,, | Performed by: EMERGENCY MEDICINE

## 2021-09-18 PROCEDURE — 63600175 PHARM REV CODE 636 W HCPCS: Performed by: EMERGENCY MEDICINE

## 2021-09-18 PROCEDURE — 99285 EMERGENCY DEPT VISIT HI MDM: CPT | Mod: ,,, | Performed by: EMERGENCY MEDICINE

## 2021-09-18 PROCEDURE — 85025 COMPLETE CBC W/AUTO DIFF WBC: CPT | Performed by: EMERGENCY MEDICINE

## 2021-09-18 RX ORDER — ONDANSETRON 2 MG/ML
4 INJECTION INTRAMUSCULAR; INTRAVENOUS
Status: COMPLETED | OUTPATIENT
Start: 2021-09-18 | End: 2021-09-18

## 2021-09-18 RX ORDER — ONDANSETRON 4 MG/1
4 TABLET, ORALLY DISINTEGRATING ORAL EVERY 6 HOURS PRN
Qty: 10 TABLET | Refills: 0 | Status: SHIPPED | OUTPATIENT
Start: 2021-09-18 | End: 2022-06-21

## 2021-09-18 RX ORDER — DIPHENOXYLATE HYDROCHLORIDE AND ATROPINE SULFATE 2.5; .025 MG/1; MG/1
1 TABLET ORAL
Status: COMPLETED | OUTPATIENT
Start: 2021-09-18 | End: 2021-09-18

## 2021-09-18 RX ORDER — DIPHENOXYLATE HYDROCHLORIDE AND ATROPINE SULFATE 2.5; .025 MG/1; MG/1
1 TABLET ORAL 4 TIMES DAILY PRN
Qty: 20 TABLET | Refills: 0 | Status: SHIPPED | OUTPATIENT
Start: 2021-09-18 | End: 2022-06-21

## 2021-09-18 RX ADMIN — DIPHENOXYLATE HYDROCHLORIDE AND ATROPINE SULFATE 1 TABLET: 2.5; .025 TABLET ORAL at 10:09

## 2021-09-18 RX ADMIN — ONDANSETRON 4 MG: 2 INJECTION INTRAMUSCULAR; INTRAVENOUS at 09:09

## 2021-09-18 RX ADMIN — SODIUM CHLORIDE 1000 ML: 0.9 INJECTION, SOLUTION INTRAVENOUS at 09:09

## 2021-09-27 DIAGNOSIS — I10 HYPERTENSION, ESSENTIAL: Chronic | ICD-10-CM

## 2021-09-27 RX ORDER — DOXAZOSIN 1 MG/1
TABLET ORAL
Qty: 30 TABLET | Refills: 4 | Status: SHIPPED | OUTPATIENT
Start: 2021-09-27 | End: 2021-11-26

## 2021-09-29 ENCOUNTER — CLINICAL SUPPORT (OUTPATIENT)
Dept: REHABILITATION | Facility: HOSPITAL | Age: 86
End: 2021-09-29
Attending: INTERNAL MEDICINE
Payer: MEDICARE

## 2021-09-29 DIAGNOSIS — G89.29 CHRONIC BILATERAL LOW BACK PAIN WITHOUT SCIATICA: ICD-10-CM

## 2021-09-29 DIAGNOSIS — Z74.09 IMPAIRED FUNCTIONAL MOBILITY, BALANCE, AND ENDURANCE: ICD-10-CM

## 2021-09-29 DIAGNOSIS — M54.50 CHRONIC BILATERAL LOW BACK PAIN WITHOUT SCIATICA: ICD-10-CM

## 2021-09-29 PROCEDURE — 97164 PT RE-EVAL EST PLAN CARE: CPT | Mod: PO

## 2021-09-30 ENCOUNTER — TELEPHONE (OUTPATIENT)
Dept: INTERNAL MEDICINE | Facility: CLINIC | Age: 86
End: 2021-09-30

## 2021-09-30 DIAGNOSIS — M25.561 RIGHT KNEE PAIN, UNSPECIFIED CHRONICITY: Primary | ICD-10-CM

## 2021-10-01 ENCOUNTER — CLINICAL SUPPORT (OUTPATIENT)
Dept: REHABILITATION | Facility: HOSPITAL | Age: 86
End: 2021-10-01
Attending: INTERNAL MEDICINE
Payer: MEDICARE

## 2021-10-01 DIAGNOSIS — Z74.09 IMPAIRED FUNCTIONAL MOBILITY, BALANCE, AND ENDURANCE: ICD-10-CM

## 2021-10-01 PROCEDURE — 97110 THERAPEUTIC EXERCISES: CPT | Mod: PO,CQ

## 2021-10-05 ENCOUNTER — CLINICAL SUPPORT (OUTPATIENT)
Dept: REHABILITATION | Facility: HOSPITAL | Age: 86
End: 2021-10-05
Attending: INTERNAL MEDICINE
Payer: MEDICARE

## 2021-10-05 DIAGNOSIS — Z74.09 IMPAIRED FUNCTIONAL MOBILITY, BALANCE, AND ENDURANCE: ICD-10-CM

## 2021-10-05 PROCEDURE — 97110 THERAPEUTIC EXERCISES: CPT | Mod: KX,PO,CQ

## 2021-10-07 ENCOUNTER — CLINICAL SUPPORT (OUTPATIENT)
Dept: REHABILITATION | Facility: HOSPITAL | Age: 86
End: 2021-10-07
Attending: INTERNAL MEDICINE
Payer: MEDICARE

## 2021-10-07 DIAGNOSIS — M54.50 CHRONIC BILATERAL LOW BACK PAIN, UNSPECIFIED WHETHER SCIATICA PRESENT: ICD-10-CM

## 2021-10-07 DIAGNOSIS — Z74.09 IMPAIRED FUNCTIONAL MOBILITY, BALANCE, AND ENDURANCE: ICD-10-CM

## 2021-10-07 DIAGNOSIS — G89.29 CHRONIC BILATERAL LOW BACK PAIN, UNSPECIFIED WHETHER SCIATICA PRESENT: ICD-10-CM

## 2021-10-07 DIAGNOSIS — M25.561 RIGHT KNEE PAIN, UNSPECIFIED CHRONICITY: ICD-10-CM

## 2021-10-07 PROCEDURE — 97110 THERAPEUTIC EXERCISES: CPT | Mod: PO

## 2021-10-07 PROCEDURE — 97140 MANUAL THERAPY 1/> REGIONS: CPT | Mod: PO

## 2021-10-12 ENCOUNTER — CLINICAL SUPPORT (OUTPATIENT)
Dept: REHABILITATION | Facility: HOSPITAL | Age: 86
End: 2021-10-12
Attending: INTERNAL MEDICINE
Payer: MEDICARE

## 2021-10-12 DIAGNOSIS — Z74.09 IMPAIRED FUNCTIONAL MOBILITY, BALANCE, AND ENDURANCE: ICD-10-CM

## 2021-10-12 DIAGNOSIS — M54.50 CHRONIC BILATERAL LOW BACK PAIN, UNSPECIFIED WHETHER SCIATICA PRESENT: ICD-10-CM

## 2021-10-12 DIAGNOSIS — G89.29 CHRONIC BILATERAL LOW BACK PAIN, UNSPECIFIED WHETHER SCIATICA PRESENT: ICD-10-CM

## 2021-10-12 PROCEDURE — 97110 THERAPEUTIC EXERCISES: CPT | Mod: KX,PO,CQ

## 2021-10-12 PROCEDURE — 97140 MANUAL THERAPY 1/> REGIONS: CPT | Mod: KX,PO,CQ

## 2021-10-14 ENCOUNTER — CLINICAL SUPPORT (OUTPATIENT)
Dept: REHABILITATION | Facility: HOSPITAL | Age: 86
End: 2021-10-14
Attending: INTERNAL MEDICINE
Payer: MEDICARE

## 2021-10-14 DIAGNOSIS — Z74.09 IMPAIRED FUNCTIONAL MOBILITY, BALANCE, AND ENDURANCE: ICD-10-CM

## 2021-10-14 DIAGNOSIS — M54.50 CHRONIC BILATERAL LOW BACK PAIN, UNSPECIFIED WHETHER SCIATICA PRESENT: ICD-10-CM

## 2021-10-14 DIAGNOSIS — G89.29 CHRONIC BILATERAL LOW BACK PAIN, UNSPECIFIED WHETHER SCIATICA PRESENT: ICD-10-CM

## 2021-10-14 PROCEDURE — 97110 THERAPEUTIC EXERCISES: CPT | Mod: PO

## 2021-10-14 PROCEDURE — 97140 MANUAL THERAPY 1/> REGIONS: CPT | Mod: PO

## 2021-10-18 ENCOUNTER — OFFICE VISIT (OUTPATIENT)
Dept: INTERNAL MEDICINE | Facility: CLINIC | Age: 86
End: 2021-10-18
Payer: MEDICARE

## 2021-10-18 VITALS
HEIGHT: 62 IN | WEIGHT: 138.88 LBS | SYSTOLIC BLOOD PRESSURE: 130 MMHG | BODY MASS INDEX: 25.55 KG/M2 | HEART RATE: 70 BPM | DIASTOLIC BLOOD PRESSURE: 60 MMHG | RESPIRATION RATE: 10 BRPM

## 2021-10-18 DIAGNOSIS — R19.7 DIARRHEA, UNSPECIFIED TYPE: Primary | ICD-10-CM

## 2021-10-18 DIAGNOSIS — R68.2 DRY MOUTH: ICD-10-CM

## 2021-10-18 DIAGNOSIS — M25.561 CHRONIC PAIN OF RIGHT KNEE: ICD-10-CM

## 2021-10-18 DIAGNOSIS — G89.29 CHRONIC PAIN OF RIGHT KNEE: ICD-10-CM

## 2021-10-18 PROCEDURE — 99213 OFFICE O/P EST LOW 20 MIN: CPT | Mod: S$PBB,,, | Performed by: INTERNAL MEDICINE

## 2021-10-18 PROCEDURE — 99213 PR OFFICE/OUTPT VISIT, EST, LEVL III, 20-29 MIN: ICD-10-PCS | Mod: S$PBB,,, | Performed by: INTERNAL MEDICINE

## 2021-10-18 PROCEDURE — 99213 OFFICE O/P EST LOW 20 MIN: CPT | Mod: PBBFAC,PO,25 | Performed by: INTERNAL MEDICINE

## 2021-10-18 PROCEDURE — 90694 VACC AIIV4 NO PRSRV 0.5ML IM: CPT | Mod: PBBFAC,PO

## 2021-10-18 PROCEDURE — 99999 PR PBB SHADOW E&M-EST. PATIENT-LVL III: CPT | Mod: PBBFAC,,, | Performed by: INTERNAL MEDICINE

## 2021-10-18 PROCEDURE — G0008 ADMIN INFLUENZA VIRUS VAC: HCPCS | Mod: PBBFAC,PO

## 2021-10-18 PROCEDURE — 99999 PR PBB SHADOW E&M-EST. PATIENT-LVL III: ICD-10-PCS | Mod: PBBFAC,,, | Performed by: INTERNAL MEDICINE

## 2021-10-19 ENCOUNTER — CLINICAL SUPPORT (OUTPATIENT)
Dept: REHABILITATION | Facility: HOSPITAL | Age: 86
End: 2021-10-19
Attending: INTERNAL MEDICINE
Payer: MEDICARE

## 2021-10-19 ENCOUNTER — OFFICE VISIT (OUTPATIENT)
Dept: ORTHOPEDICS | Facility: CLINIC | Age: 86
End: 2021-10-19
Payer: MEDICARE

## 2021-10-19 ENCOUNTER — TELEPHONE (OUTPATIENT)
Dept: INTERNAL MEDICINE | Facility: CLINIC | Age: 86
End: 2021-10-19

## 2021-10-19 VITALS — HEIGHT: 62 IN | BODY MASS INDEX: 25.55 KG/M2 | WEIGHT: 138.88 LBS

## 2021-10-19 DIAGNOSIS — M17.11 PRIMARY OSTEOARTHRITIS OF RIGHT KNEE: Primary | ICD-10-CM

## 2021-10-19 DIAGNOSIS — Z74.09 IMPAIRED FUNCTIONAL MOBILITY, BALANCE, AND ENDURANCE: ICD-10-CM

## 2021-10-19 DIAGNOSIS — M54.50 CHRONIC BILATERAL LOW BACK PAIN, UNSPECIFIED WHETHER SCIATICA PRESENT: ICD-10-CM

## 2021-10-19 DIAGNOSIS — G89.29 CHRONIC BILATERAL LOW BACK PAIN, UNSPECIFIED WHETHER SCIATICA PRESENT: ICD-10-CM

## 2021-10-19 PROCEDURE — 99213 OFFICE O/P EST LOW 20 MIN: CPT | Mod: 25,S$PBB,, | Performed by: PHYSICIAN ASSISTANT

## 2021-10-19 PROCEDURE — 99999 PR PBB SHADOW E&M-EST. PATIENT-LVL III: ICD-10-PCS | Mod: PBBFAC,,, | Performed by: PHYSICIAN ASSISTANT

## 2021-10-19 PROCEDURE — 97110 THERAPEUTIC EXERCISES: CPT | Mod: KX,PO,CQ

## 2021-10-19 PROCEDURE — 20610 DRAIN/INJ JOINT/BURSA W/O US: CPT | Mod: PBBFAC,RT | Performed by: PHYSICIAN ASSISTANT

## 2021-10-19 PROCEDURE — 99213 PR OFFICE/OUTPT VISIT, EST, LEVL III, 20-29 MIN: ICD-10-PCS | Mod: 25,S$PBB,, | Performed by: PHYSICIAN ASSISTANT

## 2021-10-19 PROCEDURE — 20610 PR DRAIN/INJECT LARGE JOINT/BURSA: ICD-10-PCS | Mod: S$PBB,RT,, | Performed by: PHYSICIAN ASSISTANT

## 2021-10-19 PROCEDURE — 97140 MANUAL THERAPY 1/> REGIONS: CPT | Mod: KX,PO,CQ

## 2021-10-19 PROCEDURE — 99213 OFFICE O/P EST LOW 20 MIN: CPT | Mod: PBBFAC,25 | Performed by: PHYSICIAN ASSISTANT

## 2021-10-19 PROCEDURE — 20610 DRAIN/INJ JOINT/BURSA W/O US: CPT | Mod: S$PBB,RT,, | Performed by: PHYSICIAN ASSISTANT

## 2021-10-19 PROCEDURE — 99999 PR PBB SHADOW E&M-EST. PATIENT-LVL III: CPT | Mod: PBBFAC,,, | Performed by: PHYSICIAN ASSISTANT

## 2021-10-19 RX ADMIN — METHYLPREDNISOLONE ACETATE 80 MG: 80 INJECTION, SUSPENSION INTRALESIONAL; INTRAMUSCULAR; INTRASYNOVIAL; SOFT TISSUE at 02:10

## 2021-10-21 ENCOUNTER — CLINICAL SUPPORT (OUTPATIENT)
Dept: REHABILITATION | Facility: HOSPITAL | Age: 86
End: 2021-10-21
Attending: INTERNAL MEDICINE
Payer: MEDICARE

## 2021-10-21 DIAGNOSIS — G89.29 CHRONIC BILATERAL LOW BACK PAIN, UNSPECIFIED WHETHER SCIATICA PRESENT: ICD-10-CM

## 2021-10-21 DIAGNOSIS — Z74.09 IMPAIRED FUNCTIONAL MOBILITY, BALANCE, AND ENDURANCE: ICD-10-CM

## 2021-10-21 DIAGNOSIS — M54.50 CHRONIC BILATERAL LOW BACK PAIN, UNSPECIFIED WHETHER SCIATICA PRESENT: ICD-10-CM

## 2021-10-21 PROCEDURE — 97164 PT RE-EVAL EST PLAN CARE: CPT | Mod: PO

## 2021-10-21 PROCEDURE — 97110 THERAPEUTIC EXERCISES: CPT | Mod: PO

## 2021-10-22 RX ORDER — METHYLPREDNISOLONE ACETATE 80 MG/ML
80 INJECTION, SUSPENSION INTRA-ARTICULAR; INTRALESIONAL; INTRAMUSCULAR; SOFT TISSUE
Status: COMPLETED | OUTPATIENT
Start: 2021-10-19 | End: 2021-10-19

## 2021-10-26 ENCOUNTER — CLINICAL SUPPORT (OUTPATIENT)
Dept: REHABILITATION | Facility: HOSPITAL | Age: 86
End: 2021-10-26
Attending: INTERNAL MEDICINE
Payer: MEDICARE

## 2021-10-26 DIAGNOSIS — M54.50 CHRONIC BILATERAL LOW BACK PAIN, UNSPECIFIED WHETHER SCIATICA PRESENT: ICD-10-CM

## 2021-10-26 DIAGNOSIS — G89.29 CHRONIC BILATERAL LOW BACK PAIN, UNSPECIFIED WHETHER SCIATICA PRESENT: ICD-10-CM

## 2021-10-26 DIAGNOSIS — Z74.09 IMPAIRED FUNCTIONAL MOBILITY, BALANCE, AND ENDURANCE: ICD-10-CM

## 2021-10-26 PROCEDURE — 97140 MANUAL THERAPY 1/> REGIONS: CPT | Mod: KX,PO,CQ

## 2021-10-26 PROCEDURE — 97110 THERAPEUTIC EXERCISES: CPT | Mod: KX,PO,CQ

## 2021-10-28 ENCOUNTER — CLINICAL SUPPORT (OUTPATIENT)
Dept: REHABILITATION | Facility: HOSPITAL | Age: 86
End: 2021-10-28
Attending: INTERNAL MEDICINE
Payer: MEDICARE

## 2021-10-28 DIAGNOSIS — Z74.09 IMPAIRED FUNCTIONAL MOBILITY, BALANCE, AND ENDURANCE: ICD-10-CM

## 2021-10-28 DIAGNOSIS — M54.50 CHRONIC BILATERAL LOW BACK PAIN, UNSPECIFIED WHETHER SCIATICA PRESENT: ICD-10-CM

## 2021-10-28 DIAGNOSIS — G89.29 CHRONIC BILATERAL LOW BACK PAIN, UNSPECIFIED WHETHER SCIATICA PRESENT: ICD-10-CM

## 2021-10-28 PROCEDURE — 97140 MANUAL THERAPY 1/> REGIONS: CPT | Mod: KX,PO

## 2021-10-28 PROCEDURE — 97110 THERAPEUTIC EXERCISES: CPT | Mod: KX,PO

## 2021-11-02 ENCOUNTER — CLINICAL SUPPORT (OUTPATIENT)
Dept: REHABILITATION | Facility: HOSPITAL | Age: 86
End: 2021-11-02
Attending: INTERNAL MEDICINE
Payer: MEDICARE

## 2021-11-02 ENCOUNTER — IMMUNIZATION (OUTPATIENT)
Dept: INTERNAL MEDICINE | Facility: CLINIC | Age: 86
End: 2021-11-02
Payer: MEDICARE

## 2021-11-02 DIAGNOSIS — M54.50 CHRONIC BILATERAL LOW BACK PAIN, UNSPECIFIED WHETHER SCIATICA PRESENT: ICD-10-CM

## 2021-11-02 DIAGNOSIS — Z74.09 IMPAIRED FUNCTIONAL MOBILITY, BALANCE, AND ENDURANCE: ICD-10-CM

## 2021-11-02 DIAGNOSIS — G89.29 CHRONIC BILATERAL LOW BACK PAIN, UNSPECIFIED WHETHER SCIATICA PRESENT: ICD-10-CM

## 2021-11-02 DIAGNOSIS — Z23 NEED FOR VACCINATION: Primary | ICD-10-CM

## 2021-11-02 PROCEDURE — 0004A COVID-19, MRNA, LNP-S, PF, 30 MCG/0.3 ML DOSE VACCINE: CPT | Mod: PBBFAC,CV19

## 2021-11-02 PROCEDURE — 97140 MANUAL THERAPY 1/> REGIONS: CPT | Mod: KX,PO,CQ

## 2021-11-02 PROCEDURE — 97110 THERAPEUTIC EXERCISES: CPT | Mod: KX,PO,CQ

## 2021-11-04 ENCOUNTER — CLINICAL SUPPORT (OUTPATIENT)
Dept: REHABILITATION | Facility: HOSPITAL | Age: 86
End: 2021-11-04
Attending: INTERNAL MEDICINE
Payer: MEDICARE

## 2021-11-04 DIAGNOSIS — Z74.09 IMPAIRED FUNCTIONAL MOBILITY, BALANCE, AND ENDURANCE: ICD-10-CM

## 2021-11-04 DIAGNOSIS — G89.29 CHRONIC BILATERAL LOW BACK PAIN, UNSPECIFIED WHETHER SCIATICA PRESENT: ICD-10-CM

## 2021-11-04 DIAGNOSIS — M54.50 CHRONIC BILATERAL LOW BACK PAIN, UNSPECIFIED WHETHER SCIATICA PRESENT: ICD-10-CM

## 2021-11-04 PROCEDURE — 97140 MANUAL THERAPY 1/> REGIONS: CPT | Mod: KX,PO

## 2021-11-04 PROCEDURE — 97110 THERAPEUTIC EXERCISES: CPT | Mod: KX,PO

## 2021-11-08 ENCOUNTER — TELEPHONE (OUTPATIENT)
Dept: PAIN MEDICINE | Facility: CLINIC | Age: 86
End: 2021-11-08
Payer: MEDICARE

## 2021-11-08 ENCOUNTER — HOSPITAL ENCOUNTER (OUTPATIENT)
Dept: RADIOLOGY | Facility: HOSPITAL | Age: 86
Discharge: HOME | End: 2021-11-08
Attending: PAIN MEDICINE
Payer: MEDICARE

## 2021-11-08 ENCOUNTER — OFFICE VISIT (OUTPATIENT)
Dept: PAIN MEDICINE | Facility: CLINIC | Age: 86
End: 2021-11-08
Payer: MEDICARE

## 2021-11-08 VITALS — HEART RATE: 62 BPM | DIASTOLIC BLOOD PRESSURE: 71 MMHG | SYSTOLIC BLOOD PRESSURE: 133 MMHG

## 2021-11-08 DIAGNOSIS — M54.2 CHRONIC NECK PAIN: Primary | ICD-10-CM

## 2021-11-08 DIAGNOSIS — M47.812 CERVICAL SPONDYLOSIS: ICD-10-CM

## 2021-11-08 DIAGNOSIS — M54.2 CHRONIC NECK PAIN: ICD-10-CM

## 2021-11-08 DIAGNOSIS — G89.29 CHRONIC NECK PAIN: Primary | ICD-10-CM

## 2021-11-08 DIAGNOSIS — G89.29 CHRONIC NECK PAIN: ICD-10-CM

## 2021-11-08 DIAGNOSIS — M47.812 CERVICAL SPONDYLOSIS: Primary | ICD-10-CM

## 2021-11-08 PROCEDURE — 99213 OFFICE O/P EST LOW 20 MIN: CPT | Mod: PBBFAC,PO | Performed by: PAIN MEDICINE

## 2021-11-08 PROCEDURE — 99999 PR PBB SHADOW E&M-EST. PATIENT-LVL III: ICD-10-PCS | Mod: PBBFAC,,, | Performed by: PAIN MEDICINE

## 2021-11-08 PROCEDURE — 72050 X-RAY EXAM NECK SPINE 4/5VWS: CPT | Mod: 26,,, | Performed by: RADIOLOGY

## 2021-11-08 PROCEDURE — 99204 OFFICE O/P NEW MOD 45 MIN: CPT | Mod: S$PBB,,, | Performed by: PAIN MEDICINE

## 2021-11-08 PROCEDURE — 99999 PR PBB SHADOW E&M-EST. PATIENT-LVL III: CPT | Mod: PBBFAC,,, | Performed by: PAIN MEDICINE

## 2021-11-08 PROCEDURE — 99204 PR OFFICE/OUTPT VISIT, NEW, LEVL IV, 45-59 MIN: ICD-10-PCS | Mod: S$PBB,,, | Performed by: PAIN MEDICINE

## 2021-11-08 PROCEDURE — 72050 XR CERVICAL SPINE COMPLETE 5 VIEW: ICD-10-PCS | Mod: 26,,, | Performed by: RADIOLOGY

## 2021-11-08 PROCEDURE — 72050 X-RAY EXAM NECK SPINE 4/5VWS: CPT | Mod: TC,PO

## 2021-11-09 ENCOUNTER — CLINICAL SUPPORT (OUTPATIENT)
Dept: REHABILITATION | Facility: HOSPITAL | Age: 86
End: 2021-11-09
Attending: INTERNAL MEDICINE
Payer: MEDICARE

## 2021-11-09 DIAGNOSIS — G89.29 CHRONIC BILATERAL LOW BACK PAIN, UNSPECIFIED WHETHER SCIATICA PRESENT: ICD-10-CM

## 2021-11-09 DIAGNOSIS — Z74.09 IMPAIRED FUNCTIONAL MOBILITY, BALANCE, AND ENDURANCE: ICD-10-CM

## 2021-11-09 DIAGNOSIS — M54.50 CHRONIC BILATERAL LOW BACK PAIN, UNSPECIFIED WHETHER SCIATICA PRESENT: ICD-10-CM

## 2021-11-09 PROCEDURE — 97110 THERAPEUTIC EXERCISES: CPT | Mod: PO,CQ

## 2021-11-09 PROCEDURE — 97140 MANUAL THERAPY 1/> REGIONS: CPT | Mod: PO,CQ

## 2021-11-09 PROCEDURE — 97112 NEUROMUSCULAR REEDUCATION: CPT | Mod: PO,CQ

## 2021-11-11 ENCOUNTER — CLINICAL SUPPORT (OUTPATIENT)
Dept: REHABILITATION | Facility: HOSPITAL | Age: 86
End: 2021-11-11
Attending: INTERNAL MEDICINE
Payer: MEDICARE

## 2021-11-11 DIAGNOSIS — M54.50 CHRONIC BILATERAL LOW BACK PAIN, UNSPECIFIED WHETHER SCIATICA PRESENT: ICD-10-CM

## 2021-11-11 DIAGNOSIS — Z74.09 IMPAIRED FUNCTIONAL MOBILITY, BALANCE, AND ENDURANCE: ICD-10-CM

## 2021-11-11 DIAGNOSIS — G89.29 CHRONIC BILATERAL LOW BACK PAIN, UNSPECIFIED WHETHER SCIATICA PRESENT: ICD-10-CM

## 2021-11-11 PROCEDURE — 97140 MANUAL THERAPY 1/> REGIONS: CPT | Mod: KX,PO

## 2021-11-16 ENCOUNTER — CLINICAL SUPPORT (OUTPATIENT)
Dept: REHABILITATION | Facility: HOSPITAL | Age: 86
End: 2021-11-16
Attending: INTERNAL MEDICINE
Payer: MEDICARE

## 2021-11-16 DIAGNOSIS — Z74.09 IMPAIRED FUNCTIONAL MOBILITY, BALANCE, AND ENDURANCE: ICD-10-CM

## 2021-11-16 DIAGNOSIS — G89.29 CHRONIC BILATERAL LOW BACK PAIN, UNSPECIFIED WHETHER SCIATICA PRESENT: ICD-10-CM

## 2021-11-16 DIAGNOSIS — M54.50 CHRONIC BILATERAL LOW BACK PAIN, UNSPECIFIED WHETHER SCIATICA PRESENT: ICD-10-CM

## 2021-11-16 PROCEDURE — 97140 MANUAL THERAPY 1/> REGIONS: CPT | Mod: KX,PO,CQ

## 2021-11-16 PROCEDURE — 97110 THERAPEUTIC EXERCISES: CPT | Mod: KX,PO,CQ

## 2021-11-17 ENCOUNTER — TELEPHONE (OUTPATIENT)
Dept: PAIN MEDICINE | Facility: CLINIC | Age: 86
End: 2021-11-17
Payer: MEDICARE

## 2021-11-18 ENCOUNTER — HOSPITAL ENCOUNTER (OUTPATIENT)
Facility: HOSPITAL | Age: 86
Discharge: HOME | End: 2021-11-18
Attending: PAIN MEDICINE | Admitting: PAIN MEDICINE
Payer: MEDICARE

## 2021-11-18 VITALS
WEIGHT: 138 LBS | BODY MASS INDEX: 25.4 KG/M2 | RESPIRATION RATE: 18 BRPM | HEART RATE: 98 BPM | HEIGHT: 62 IN | SYSTOLIC BLOOD PRESSURE: 161 MMHG | OXYGEN SATURATION: 95 % | TEMPERATURE: 98 F | DIASTOLIC BLOOD PRESSURE: 67 MMHG

## 2021-11-18 DIAGNOSIS — G89.29 CHRONIC PAIN: ICD-10-CM

## 2021-11-18 DIAGNOSIS — G89.29 CHRONIC NECK PAIN: Primary | ICD-10-CM

## 2021-11-18 DIAGNOSIS — M54.2 CHRONIC NECK PAIN: Primary | ICD-10-CM

## 2021-11-18 PROCEDURE — 64490 INJ PARAVERT F JNT C/T 1 LEV: CPT | Mod: LT,,, | Performed by: PAIN MEDICINE

## 2021-11-18 PROCEDURE — 99152 MOD SED SAME PHYS/QHP 5/>YRS: CPT | Performed by: PAIN MEDICINE

## 2021-11-18 PROCEDURE — 25000003 PHARM REV CODE 250: Performed by: PAIN MEDICINE

## 2021-11-18 PROCEDURE — 64491 PR INJ DX/THER AGNT PARAVERT FACET JOINT,IMG GUIDE,CERV/THORAC, 2ND LEVEL: ICD-10-PCS | Mod: LT,,, | Performed by: PAIN MEDICINE

## 2021-11-18 PROCEDURE — 64491 INJ PARAVERT F JNT C/T 2 LEV: CPT | Performed by: PAIN MEDICINE

## 2021-11-18 PROCEDURE — 63600175 PHARM REV CODE 636 W HCPCS: Performed by: PAIN MEDICINE

## 2021-11-18 PROCEDURE — 64491 INJ PARAVERT F JNT C/T 2 LEV: CPT | Mod: LT,,, | Performed by: PAIN MEDICINE

## 2021-11-18 PROCEDURE — 64490 PR INJ DX/THER AGNT PARAVERT FACET JOINT,IMG GUIDE,CERV/THORAC, 1ST LEVEL: ICD-10-PCS | Mod: LT,,, | Performed by: PAIN MEDICINE

## 2021-11-18 PROCEDURE — 64490 INJ PARAVERT F JNT C/T 1 LEV: CPT | Performed by: PAIN MEDICINE

## 2021-11-18 RX ORDER — MIDAZOLAM HYDROCHLORIDE 1 MG/ML
INJECTION, SOLUTION INTRAMUSCULAR; INTRAVENOUS
Status: DISCONTINUED | OUTPATIENT
Start: 2021-11-18 | End: 2021-11-18 | Stop reason: HOSPADM

## 2021-11-18 RX ORDER — SODIUM BICARBONATE 1 MEQ/ML
SYRINGE (ML) INTRAVENOUS
Status: DISCONTINUED | OUTPATIENT
Start: 2021-11-18 | End: 2021-11-18 | Stop reason: HOSPADM

## 2021-11-18 RX ORDER — DEXAMETHASONE SODIUM PHOSPHATE 10 MG/ML
INJECTION INTRAMUSCULAR; INTRAVENOUS
Status: DISCONTINUED | OUTPATIENT
Start: 2021-11-18 | End: 2021-11-18 | Stop reason: HOSPADM

## 2021-11-18 RX ORDER — LIDOCAINE HYDROCHLORIDE 10 MG/ML
1 INJECTION, SOLUTION EPIDURAL; INFILTRATION; INTRACAUDAL; PERINEURAL ONCE
Status: COMPLETED | OUTPATIENT
Start: 2021-11-18 | End: 2021-11-18

## 2021-11-18 RX ORDER — CHOLECALCIFEROL (VITAMIN D3) 125 MCG
1 CAPSULE ORAL 3 TIMES DAILY PRN
COMMUNITY
End: 2023-12-13

## 2021-11-18 RX ORDER — SODIUM CHLORIDE 9 MG/ML
INJECTION, SOLUTION INTRAVENOUS CONTINUOUS
Status: DISCONTINUED | OUTPATIENT
Start: 2021-11-18 | End: 2021-11-18 | Stop reason: HOSPADM

## 2021-11-18 RX ORDER — FENTANYL CITRATE 50 UG/ML
INJECTION, SOLUTION INTRAMUSCULAR; INTRAVENOUS
Status: DISCONTINUED | OUTPATIENT
Start: 2021-11-18 | End: 2021-11-18 | Stop reason: HOSPADM

## 2021-11-18 RX ORDER — LIDOCAINE HYDROCHLORIDE 10 MG/ML
INJECTION INFILTRATION; PERINEURAL
Status: DISCONTINUED | OUTPATIENT
Start: 2021-11-18 | End: 2021-11-18 | Stop reason: HOSPADM

## 2021-11-19 ENCOUNTER — TELEPHONE (OUTPATIENT)
Dept: PAIN MEDICINE | Facility: CLINIC | Age: 86
End: 2021-11-19
Payer: MEDICARE

## 2021-11-23 ENCOUNTER — CLINICAL SUPPORT (OUTPATIENT)
Dept: REHABILITATION | Facility: HOSPITAL | Age: 86
End: 2021-11-23
Attending: INTERNAL MEDICINE
Payer: MEDICARE

## 2021-11-23 ENCOUNTER — TELEPHONE (OUTPATIENT)
Dept: PAIN MEDICINE | Facility: CLINIC | Age: 86
End: 2021-11-23
Payer: MEDICARE

## 2021-11-23 DIAGNOSIS — G89.29 CHRONIC BILATERAL LOW BACK PAIN, UNSPECIFIED WHETHER SCIATICA PRESENT: ICD-10-CM

## 2021-11-23 DIAGNOSIS — M54.50 CHRONIC BILATERAL LOW BACK PAIN, UNSPECIFIED WHETHER SCIATICA PRESENT: ICD-10-CM

## 2021-11-23 DIAGNOSIS — Z74.09 IMPAIRED FUNCTIONAL MOBILITY, BALANCE, AND ENDURANCE: ICD-10-CM

## 2021-11-23 PROCEDURE — 97110 THERAPEUTIC EXERCISES: CPT | Mod: PO

## 2021-11-25 DIAGNOSIS — I10 ESSENTIAL HYPERTENSION: ICD-10-CM

## 2021-11-25 DIAGNOSIS — I10 HYPERTENSION, ESSENTIAL: Chronic | ICD-10-CM

## 2021-11-26 ENCOUNTER — PATIENT MESSAGE (OUTPATIENT)
Dept: PAIN MEDICINE | Facility: CLINIC | Age: 86
End: 2021-11-26
Payer: MEDICARE

## 2021-11-26 ENCOUNTER — TELEPHONE (OUTPATIENT)
Dept: PAIN MEDICINE | Facility: CLINIC | Age: 86
End: 2021-11-26
Payer: MEDICARE

## 2021-11-26 DIAGNOSIS — M47.812 CERVICAL SPONDYLOSIS: Primary | ICD-10-CM

## 2021-11-26 RX ORDER — LABETALOL 100 MG/1
TABLET, FILM COATED ORAL
Qty: 180 TABLET | Refills: 3 | Status: SHIPPED | OUTPATIENT
Start: 2021-11-26 | End: 2021-11-29 | Stop reason: SDUPTHER

## 2021-11-26 RX ORDER — LORAZEPAM 0.5 MG/1
TABLET ORAL
Qty: 30 TABLET | Refills: 0 | Status: SHIPPED | OUTPATIENT
Start: 2021-11-26 | End: 2022-01-06

## 2021-11-26 RX ORDER — TIZANIDINE 4 MG/1
TABLET ORAL
Qty: 50 TABLET | Refills: 0 | Status: SHIPPED | OUTPATIENT
Start: 2021-11-26 | End: 2022-06-21

## 2021-11-26 RX ORDER — DOXAZOSIN 1 MG/1
TABLET ORAL
Qty: 90 TABLET | Refills: 3 | Status: SHIPPED | OUTPATIENT
Start: 2021-11-26 | End: 2022-06-21

## 2021-11-29 ENCOUNTER — TELEPHONE (OUTPATIENT)
Dept: INTERNAL MEDICINE | Facility: CLINIC | Age: 86
End: 2021-11-29
Payer: MEDICARE

## 2021-11-30 ENCOUNTER — PATIENT MESSAGE (OUTPATIENT)
Dept: PAIN MEDICINE | Facility: CLINIC | Age: 86
End: 2021-11-30
Payer: MEDICARE

## 2021-11-30 ENCOUNTER — OFFICE VISIT (OUTPATIENT)
Dept: URGENT CARE | Facility: CLINIC | Age: 86
End: 2021-11-30
Payer: MEDICARE

## 2021-11-30 VITALS
HEART RATE: 70 BPM | OXYGEN SATURATION: 95 % | DIASTOLIC BLOOD PRESSURE: 74 MMHG | HEIGHT: 62 IN | RESPIRATION RATE: 18 BRPM | WEIGHT: 137 LBS | TEMPERATURE: 98 F | BODY MASS INDEX: 25.21 KG/M2 | SYSTOLIC BLOOD PRESSURE: 126 MMHG

## 2021-11-30 DIAGNOSIS — J32.9 SINUSITIS, UNSPECIFIED CHRONICITY, UNSPECIFIED LOCATION: Primary | ICD-10-CM

## 2021-11-30 DIAGNOSIS — R05.9 COUGH: ICD-10-CM

## 2021-11-30 LAB
CTP QC/QA: YES
SARS-COV-2 RDRP RESP QL NAA+PROBE: NEGATIVE

## 2021-11-30 PROCEDURE — 99213 OFFICE O/P EST LOW 20 MIN: CPT | Mod: S$GLB,,, | Performed by: NURSE PRACTITIONER

## 2021-11-30 PROCEDURE — U0002 COVID-19 LAB TEST NON-CDC: HCPCS | Mod: QW,CR,S$GLB, | Performed by: NURSE PRACTITIONER

## 2021-11-30 PROCEDURE — 99213 PR OFFICE/OUTPT VISIT, EST, LEVL III, 20-29 MIN: ICD-10-PCS | Mod: S$GLB,,, | Performed by: NURSE PRACTITIONER

## 2021-11-30 PROCEDURE — U0002: ICD-10-PCS | Mod: QW,CR,S$GLB, | Performed by: NURSE PRACTITIONER

## 2021-11-30 RX ORDER — BENZONATATE 200 MG/1
200 CAPSULE ORAL 3 TIMES DAILY PRN
Qty: 30 CAPSULE | Refills: 0 | Status: SHIPPED | OUTPATIENT
Start: 2021-11-30 | End: 2021-12-10

## 2021-11-30 RX ORDER — AMOXICILLIN AND CLAVULANATE POTASSIUM 500; 125 MG/1; MG/1
1 TABLET, FILM COATED ORAL 2 TIMES DAILY
Qty: 14 TABLET | Refills: 0 | Status: SHIPPED | OUTPATIENT
Start: 2021-11-30 | End: 2021-12-07

## 2021-11-30 RX ORDER — FLUTICASONE PROPIONATE 50 MCG
2 SPRAY, SUSPENSION (ML) NASAL DAILY
Qty: 15.8 ML | Refills: 0 | Status: SHIPPED | OUTPATIENT
Start: 2021-11-30 | End: 2021-12-07

## 2021-11-30 RX ORDER — ALISKIREN 300 MG/1
TABLET, FILM COATED ORAL
Qty: 90 TABLET | Refills: 3 | Status: SHIPPED | OUTPATIENT
Start: 2021-11-30 | End: 2022-10-18

## 2021-12-01 ENCOUNTER — PATIENT MESSAGE (OUTPATIENT)
Dept: PAIN MEDICINE | Facility: CLINIC | Age: 86
End: 2021-12-01
Payer: MEDICARE

## 2021-12-01 ENCOUNTER — TELEPHONE (OUTPATIENT)
Dept: PAIN MEDICINE | Facility: CLINIC | Age: 86
End: 2021-12-01
Payer: MEDICARE

## 2021-12-06 ENCOUNTER — TELEPHONE (OUTPATIENT)
Dept: ORTHOPEDICS | Facility: CLINIC | Age: 86
End: 2021-12-06
Payer: MEDICARE

## 2021-12-06 ENCOUNTER — HOSPITAL ENCOUNTER (OUTPATIENT)
Dept: RADIOLOGY | Facility: HOSPITAL | Age: 86
Discharge: HOME | End: 2021-12-06
Attending: ORTHOPAEDIC SURGERY
Payer: MEDICARE

## 2021-12-06 ENCOUNTER — OFFICE VISIT (OUTPATIENT)
Dept: ORTHOPEDICS | Facility: CLINIC | Age: 86
End: 2021-12-06
Payer: MEDICARE

## 2021-12-06 VITALS — BODY MASS INDEX: 26.17 KG/M2 | HEIGHT: 62 IN | WEIGHT: 142.19 LBS

## 2021-12-06 DIAGNOSIS — M25.531 RIGHT WRIST PAIN: ICD-10-CM

## 2021-12-06 DIAGNOSIS — S66.919A TENDON RUPTURE OF WRIST, INITIAL ENCOUNTER: Primary | ICD-10-CM

## 2021-12-06 DIAGNOSIS — M19.041 PRIMARY OSTEOARTHRITIS OF BOTH HANDS: ICD-10-CM

## 2021-12-06 DIAGNOSIS — M72.0 DUPUYTREN'S DISEASE OF PALM OF RIGHT HAND: ICD-10-CM

## 2021-12-06 DIAGNOSIS — M19.042 PRIMARY OSTEOARTHRITIS OF BOTH HANDS: ICD-10-CM

## 2021-12-06 PROCEDURE — 73130 XR HAND COMPLETE 3 VIEW RIGHT: ICD-10-PCS | Mod: 26,RT,, | Performed by: RADIOLOGY

## 2021-12-06 PROCEDURE — 73130 X-RAY EXAM OF HAND: CPT | Mod: 26,RT,, | Performed by: RADIOLOGY

## 2021-12-06 PROCEDURE — 99999 PR PBB SHADOW E&M-EST. PATIENT-LVL IV: ICD-10-PCS | Mod: PBBFAC,,, | Performed by: ORTHOPAEDIC SURGERY

## 2021-12-06 PROCEDURE — 99214 PR OFFICE/OUTPT VISIT, EST, LEVL IV, 30-39 MIN: ICD-10-PCS | Mod: S$PBB,25,, | Performed by: ORTHOPAEDIC SURGERY

## 2021-12-06 PROCEDURE — 99214 OFFICE O/P EST MOD 30 MIN: CPT | Mod: PBBFAC | Performed by: ORTHOPAEDIC SURGERY

## 2021-12-06 PROCEDURE — 20600 DRAIN/INJ JOINT/BURSA W/O US: CPT | Mod: PBBFAC,LT | Performed by: ORTHOPAEDIC SURGERY

## 2021-12-06 PROCEDURE — 20600 SMALL JOINT ASPIRATION/INJECTION: L RING PIP: ICD-10-PCS | Mod: S$PBB,LT,, | Performed by: ORTHOPAEDIC SURGERY

## 2021-12-06 PROCEDURE — 99214 OFFICE O/P EST MOD 30 MIN: CPT | Mod: S$PBB,25,, | Performed by: ORTHOPAEDIC SURGERY

## 2021-12-06 PROCEDURE — 73130 X-RAY EXAM OF HAND: CPT | Mod: TC,RT

## 2021-12-06 PROCEDURE — 99999 PR PBB SHADOW E&M-EST. PATIENT-LVL IV: CPT | Mod: PBBFAC,,, | Performed by: ORTHOPAEDIC SURGERY

## 2021-12-06 RX ORDER — METHYLPREDNISOLONE ACETATE 40 MG/ML
40 INJECTION, SUSPENSION INTRA-ARTICULAR; INTRALESIONAL; INTRAMUSCULAR; SOFT TISSUE
Status: DISCONTINUED | OUTPATIENT
Start: 2021-12-06 | End: 2021-12-06 | Stop reason: HOSPADM

## 2021-12-06 RX ADMIN — METHYLPREDNISOLONE ACETATE 40 MG: 40 INJECTION, SUSPENSION INTRALESIONAL; INTRAMUSCULAR; INTRASYNOVIAL; SOFT TISSUE at 02:12

## 2021-12-13 ENCOUNTER — PATIENT MESSAGE (OUTPATIENT)
Dept: PAIN MEDICINE | Facility: CLINIC | Age: 86
End: 2021-12-13
Payer: MEDICARE

## 2021-12-23 ENCOUNTER — PATIENT MESSAGE (OUTPATIENT)
Dept: ORTHOPEDICS | Facility: CLINIC | Age: 86
End: 2021-12-23
Payer: MEDICARE

## 2021-12-23 ENCOUNTER — TELEPHONE (OUTPATIENT)
Dept: ORTHOPEDICS | Facility: CLINIC | Age: 86
End: 2021-12-23
Payer: MEDICARE

## 2021-12-30 NOTE — DISCHARGE INSTRUCTIONS
Home Care Instructions Pain Management:    1.  DIET:    You may resume your normal diet today.    2.  BATHING:    You may shower with luke warm water.    3.  DRESSING:    You may remove your bandage today.    4.  ACTIVITY LEVEL:      You may resume your normal activities 24 hours after your procedure.    5.  MEDICATIONS:    You may resume your normal medications today.    6.  SPECIAL INSTRUCTIONS:    No heat to the injection site for 24 hours including bath or shower, heating pad, moist heat or hot tubs.    Use an ice pack to the injection site for any pain or discomfort.  Apply ice packs for 20 minute intervals as needed.    If you have received any sedatives by mouth today, you can not drive for 12 hours.    If you have received sedation through an IV, you can not drive for 24 hours.    PLEASE CALL YOUR DOCTOR FOR THE FOLLOWIN.  Redness or swelling around the injection site.  2.  Fever of 101 degrees.  3.  Drainage (pus) from the injection site.  4.  For any continuous bleeding (some dried blood over the incision is normal.)    FOR EMERGENCIES:    If any unusual problems or difficulties occur during clinic hours, call [phone] or dial [phone].    Follow up with with your physician in 2-3 weeks.

## 2022-01-03 ENCOUNTER — TELEPHONE (OUTPATIENT)
Dept: PAIN MEDICINE | Facility: CLINIC | Age: 87
End: 2022-01-03
Payer: MEDICARE

## 2022-01-04 ENCOUNTER — HOSPITAL ENCOUNTER (OUTPATIENT)
Facility: HOSPITAL | Age: 87
Discharge: HOME | End: 2022-01-04
Attending: PAIN MEDICINE | Admitting: PAIN MEDICINE
Payer: MEDICARE

## 2022-01-04 VITALS
TEMPERATURE: 98 F | HEART RATE: 57 BPM | DIASTOLIC BLOOD PRESSURE: 61 MMHG | HEIGHT: 63 IN | SYSTOLIC BLOOD PRESSURE: 148 MMHG | RESPIRATION RATE: 17 BRPM | WEIGHT: 141 LBS | OXYGEN SATURATION: 99 % | BODY MASS INDEX: 24.98 KG/M2

## 2022-01-04 DIAGNOSIS — G89.29 CHRONIC NECK PAIN: Primary | ICD-10-CM

## 2022-01-04 DIAGNOSIS — M54.2 CHRONIC NECK PAIN: Primary | ICD-10-CM

## 2022-01-04 DIAGNOSIS — G89.29 CHRONIC PAIN: ICD-10-CM

## 2022-01-04 PROCEDURE — 25500020 PHARM REV CODE 255: Performed by: PAIN MEDICINE

## 2022-01-04 PROCEDURE — 64490 PR INJ DX/THER AGNT PARAVERT FACET JOINT,IMG GUIDE,CERV/THORAC, 1ST LEVEL: ICD-10-PCS | Mod: KX,LT,, | Performed by: PAIN MEDICINE

## 2022-01-04 PROCEDURE — 64491 PR INJ DX/THER AGNT PARAVERT FACET JOINT,IMG GUIDE,CERV/THORAC, 2ND LEVEL: ICD-10-PCS | Mod: KX,LT,, | Performed by: PAIN MEDICINE

## 2022-01-04 PROCEDURE — 25000003 PHARM REV CODE 250: Performed by: PAIN MEDICINE

## 2022-01-04 PROCEDURE — 64490 INJ PARAVERT F JNT C/T 1 LEV: CPT | Performed by: PAIN MEDICINE

## 2022-01-04 PROCEDURE — 64491 INJ PARAVERT F JNT C/T 2 LEV: CPT | Mod: KX,LT,, | Performed by: PAIN MEDICINE

## 2022-01-04 PROCEDURE — 64490 INJ PARAVERT F JNT C/T 1 LEV: CPT | Mod: KX,LT,, | Performed by: PAIN MEDICINE

## 2022-01-04 PROCEDURE — A9579 GAD-BASE MR CONTRAST NOS,1ML: HCPCS | Performed by: PAIN MEDICINE

## 2022-01-04 PROCEDURE — 64491 INJ PARAVERT F JNT C/T 2 LEV: CPT | Performed by: PAIN MEDICINE

## 2022-01-04 RX ORDER — LIDOCAINE HYDROCHLORIDE 10 MG/ML
INJECTION INFILTRATION; PERINEURAL
Status: DISCONTINUED | OUTPATIENT
Start: 2022-01-04 | End: 2022-01-04 | Stop reason: HOSPADM

## 2022-01-04 RX ORDER — SODIUM BICARBONATE 1 MEQ/ML
SYRINGE (ML) INTRAVENOUS
Status: DISCONTINUED | OUTPATIENT
Start: 2022-01-04 | End: 2022-01-04 | Stop reason: HOSPADM

## 2022-01-04 RX ORDER — LIDOCAINE HYDROCHLORIDE 20 MG/ML
INJECTION, SOLUTION EPIDURAL; INFILTRATION; INTRACAUDAL; PERINEURAL
Status: DISCONTINUED | OUTPATIENT
Start: 2022-01-04 | End: 2022-01-04 | Stop reason: HOSPADM

## 2022-01-04 NOTE — OP NOTE
Procedure Note    Procedure Date: 01/04/2022  Pre-operative diagnosis: Cervical Spondylosis  Post-operative diagnosis: Cervical Spondylosis  Procedure:  (1) Diagnostic Left Cervical Medial Branch Block C5-6    (2) Diagnostic Left Cervical Medial Branch Block C6-7      Indications: Diagnostic block for cervical spondylosis.      Procedure in detail:  Informed consent obtained after explaining the procedure and potential complications including local discomfort, infection, headache, temporary or permanent weakness and/or numbness of one or both legs, temporary or permanent paraplegia, heart attack and stroke. All questions were answered.      The patient was taken to the procedure room and placed in prone position.  Time out was performed.  Patient's neck/cervical spine area was prepped with Chloraprep and draped in a sterile manner.    AP fluoroscopy was used to identify and mp the waists of the mid-articular pillars of C5, C6 and C7 on the LEFT side.  The skin and subcutaneous tissues overlying the target areas was infiltrated with up to 1mL of Lidocaine 1% using a 25g 1.5 inch needle at each level.  Then, under AP fluoroscopic guidance, a 22 or 25 G 3.5 inch spinal needle, was advanced to the targeted points corresponding with the locations of the targeted medial branch nerves. Once os was encountered, lateral fluoroscopic views were obtained and the needle was advanced to the centroid of the lateral mass at each level.      After heme-negative aspiration, 0.5 mL of 2% lidocaine was injected at each of the above targeted points: corresponding to the locations of the targeted medial branch nerves.  The needles were then removed.    Estimated Blood Loss: nil    Specimens: None    Disposition: The patient tolerated the procedure without complaint and was transported to the recovery room in stable condition.    Follow-up: RTC as scheduled      Himanshu Blackmon Jr, MD  Interventional Pain Medicine /  Anesthesiology

## 2022-01-04 NOTE — H&P
[facility] (Hospital)  History & Physical - Short Stay  Pain Management           SUBJECTIVE:     Procedure: Procedure(s) (LRB):  Cervical Medial Branch Block C5-6, C6-7 (No Sedation) (N/A)    Chief Complaint/Reason for Admission:  Cervical spondylosis [M47.812]    This patient is presenting for her 2nd cervical medial branch block.  She reports 100% relief lasting for hours after the 1st medial branch block and is an excellent candidate for the 2nd medial branch block.    PTA Medications   Medication Sig    coenzyme Q10 100 mg capsule Take 100 mg by mouth once daily.    doxazosin (CARDURA) 1 MG tablet TAKE 1 TABLET BY MOUTH EVERY DAY    felodipine (PLENDIL) 2.5 MG Tb24 Take 2 tablets (5 mg total) by mouth once daily.    labetaloL (NORMODYNE) 100 MG tablet Take 1.5 tablets (150 mg total) by mouth every 12 (twelve) hours.    LIDOcaine (LIDODERM) 5 % Place 1 patch onto the skin once daily. Leave on up to 12 hours and then off 12 hours before applying the next patch    LORazepam (ATIVAN) 0.5 MG tablet TAKE 1/2 TO 1 TABLET BY MOUTH EVERY 12 HOURS AS NEEDED FOR ANXIETY    multivitamin capsule Take 1 capsule by mouth once daily.    omeprazole (PRILOSEC OTC) 20 MG tablet Take 20 mg by mouth once daily.    pravastatin (PRAVACHOL) 40 MG tablet TAKE 1 TABLET BY MOUTH EVERY DAY    tiZANidine (ZANAFLEX) 4 MG tablet TAKE 1 TABLET EVERY 8 HOURS AS NEEDED (NO DRIVING/OPERATING HEAVY MACHINERY. MAY CAUSE DROWSINESS.).    acetaminophen (TYLENOL) 650 MG TbSR Take 1 tablet (650 mg total) by mouth every 8 (eight) hours as needed.    albuterol (PROVENTIL/VENTOLIN HFA) 90 mcg/actuation inhaler Inhale 2 puffs into the lungs every 6 (six) hours as needed for Wheezing. Rescue    aliskiren (TEKTURNA) 300 MG Tab TAKE 1 TABLET BY MOUTH EVERY DAY    aspirin (ECOTRIN) 81 MG EC tablet Take 1 tablet (81 mg total) by mouth once daily.    diphenoxylate-atropine 2.5-0.025 mg (LOMOTIL) 2.5-0.025 mg per tablet Take 1 tablet  by mouth 4 (four) times daily as needed for Diarrhea.    glucosamine-chondroitin 500-400 mg tablet Take 1 tablet by mouth once daily.     lactase (LACTAID) 3,000 unit tablet Take 1 tablet by mouth 3 (three) times daily as needed.    ondansetron (ZOFRAN-ODT) 4 MG TbDL Take 1 tablet (4 mg total) by mouth every 6 (six) hours as needed (nausea).    polymyxin B sulf-trimethoprim (POLYTRIM) 10,000 unit- 1 mg/mL Drop Place 1 drop into both eyes every 6 (six) hours.    TURMERIC ORAL Take 500 mg by mouth once daily.     vitamin D (VITAMIN D3) 1000 units Tab Take 1,000 Units by mouth once daily.       Review of patient's allergies indicates:   Allergen Reactions    Sulfa (sulfonamide antibiotics) Rash    Clarithromycin Other (See Comments)     Weak, extreme fatigue. dizziness    Flexeril [cyclobenzaprine] Other (See Comments)     Dizziness    Iodine and iodide containing products     Lisinopril Other (See Comments)     Cough and sensation of throat swelling/?angioedema    Losartan Rash    Metoprolol Swelling     Tightness in throat    Tramadol Other (See Comments)     Dizzy and weak    Verapamil (bulk) Palpitations    Voltaren [diclofenac sodium] Other (See Comments)     Drops blood pressure       Past Medical History:   Diagnosis Date    Anxiety     Arthritis     Basal cell carcinoma 09/2016    right post auricular neck     Breast cancer 1992    right    Cataract     Fibromyalgia     History of measles as a child     Hyperlipidemia     Hypertension     Personal history of colonic polyps     Pneumonia     SCC (squamous cell carcinoma) 2015    R chest    SCC (squamous cell carcinoma) 2016    left medial shoulder    SCC (squamous cell carcinoma) 2017    left knee    Shingles     Squamous cell carcinoma 2015    right forearm    Thyroid disease     Vaginitis      Past Surgical History:   Procedure Laterality Date    ADENOIDECTOMY      BREAST BIOPSY Left     Excisional bx, benign    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    CARPAL TUNNEL RELEASE Right 3/16/2021    Procedure: RELEASE, CARPAL TUNNEL;  Surgeon: Barbara Aldridge MD;  Location: [facility] OR;  Service: Orthopedics;  Laterality: Right;    CATARACT EXTRACTION W/  INTRAOCULAR LENS IMPLANT Bilateral     EYE SURGERY      HAND SURGERY      INJECTION OF ANESTHETIC AGENT AROUND MEDIAL BRANCH NERVES INNERVATING LUMBAR FACET JOINT Left 11/18/2021    Procedure: Cervical Medial Branch Block Left C5-6, C6-7 (IV Sedation);  Surgeon: Himanshu Blacmkon Jr., MD;  Location: [facility] PAIN MGT;  Service: Pain Management;  Laterality: Left;    JOINT REPLACEMENT Right     ELZBIETA    KNEE ARTHROPLASTY Left 8/10/2020    Procedure: ARTHROPLASTY, KNEE-ADE NEXGEN/CEMENTED TIBIA;  Surgeon: Kalin Pacheco MD;  Location: [facility] OR;  Service: Orthopedics;  Laterality: Left;    thyriodectomy      partial    tonsillectomy      TONSILLECTOMY      TOTAL HIP ARTHROPLASTY      right    Yag  Left      Family History   Problem Relation Age of Onset    Breast cancer Mother     Cancer Mother     Stroke Paternal Grandfather     Heart disease Father     Cancer Paternal Aunt     Heart disease Paternal Aunt     Glaucoma Paternal Grandmother     Amblyopia Neg Hx     Blindness Neg Hx     Cataracts Neg Hx     Diabetes Neg Hx     Hypertension Neg Hx     Macular degeneration Neg Hx     Retinal detachment Neg Hx     Strabismus Neg Hx     Thyroid disease Neg Hx     Melanoma Neg Hx      Social History     Tobacco Use    Smoking status: Never Smoker    Smokeless tobacco: Never Used   Substance Use Topics    Alcohol use: Yes     Comment: socially - celebrations only    Drug use: Never        Review of Systems:  Review of Systems   Constitutional: Negative for chills and fever.   HENT: Negative for nosebleeds.    Eyes: Negative for blurred vision and pain.   Respiratory: Negative for hemoptysis.    Cardiovascular: Negative for chest pain and palpitations.   Gastrointestinal:  Negative for heartburn, nausea and vomiting.   Genitourinary: Negative for dysuria and hematuria.   Musculoskeletal: Positive for myalgias and neck pain.   Skin: Negative for rash.   Neurological: Negative for seizures and loss of consciousness.   Endo/Heme/Allergies: Does not bruise/bleed easily.   Psychiatric/Behavioral: Negative for hallucinations.         OBJECTIVE:     Vital Signs (Most Recent):  Temp: 97.5 °F (36.4 °C) (01/04/22 0745)  Pulse: (!) 55 (01/04/22 0745)  Resp: 18 (01/04/22 0745)  BP: (!) 129/57 (01/04/22 0745)  SpO2: 97 % (01/04/22 0745)    Physical Exam:  General appearance - alert, well appearing, and in no distress  Mental status - AOx3  Eyes - pupils equal and reactive, extraocular eye movements intact  Heart - normal rate, regular rhythm, normal S1, S2, no murmurs, rubs, clicks or gallops  Chest - clear to auscultation, no wheezes, rales or rhonchi, symmetric air entry  Abdomen - soft, nontender, nondistended, no masses or organomegaly  Neurological - alert, oriented, normal speech, no focal findings or movement disorder noted  Extremities - peripheral pulses normal, no pedal edema, no clubbing or cyanosis      ASSESSMENT/PLAN:     Cervical spondylosis  Chronic left-sided neck pain     The risks and benefits of this intervention, and alternative therapies were discussed with the patient.  The discussion of risks included infection, bleeding, need for additional procedures or surgery, nerve damage, paralysis, adverse medication reaction(s), stroke, and/or death.  Questions regarding the procedure, risks, expected outcome, and possible side effects were solicited and answered to the patient's satisfaction.  Gerda wishes to proceed with the injection.  Verbal and written consent were verified.      Proceed with MBB #2 of 2 as scheduled.      Himanshu Blackmon Jr, MD  Interventional Pain Medicine / Anesthesiology

## 2022-01-04 NOTE — PLAN OF CARE
"Pt states she fell yesterday at the store and hit her head and buttock. States her pain in her neck is 0/10 and 1/10 at rest. States "it is un-comfortable more than significant pain in my neck, and it requires me to use a heating pad every day at home". States her pain in her lower back and buttock is currently worse than the pain in her neck. Dr. Blackmon notified and @ bs speaking to patient.   "

## 2022-01-04 NOTE — DISCHARGE SUMMARY
[facility]  Discharge Note  Short Stay     Admit Date: 1/4/2022    Discharge Date: 1/4/2022     Attending Physician: Himanshu Blackmon Jr, MD    Diagnoses:  There are no hospital problems to display for this patient.    Discharged Condition: Good     Hospital Course: Patient was admitted for an outpatient interventional pain management procedure and tolerated the procedure well with no complications.     Final Diagnoses: Same as principal problem.     Disposition: Home or Self Care     Follow up/Patient Instructions:        Reconciled Medications:     Medication List      ASK your doctor about these medications    albuterol 90 mcg/actuation inhaler  Commonly known as: PROVENTIL/VENTOLIN HFA  Inhale 2 puffs into the lungs every 6 (six) hours as needed for Wheezing. Rescue     aliskiren 300 MG Tab  Commonly known as: TEKTURNA  TAKE 1 TABLET BY MOUTH EVERY DAY     aspirin 81 MG EC tablet  Commonly known as: ECOTRIN  Take 1 tablet (81 mg total) by mouth once daily.     coenzyme Q10 100 mg capsule  Take 100 mg by mouth once daily.     diphenoxylate-atropine 2.5-0.025 mg 2.5-0.025 mg per tablet  Commonly known as: LOMOTIL  Take 1 tablet by mouth 4 (four) times daily as needed for Diarrhea.     doxazosin 1 MG tablet  Commonly known as: CARDURA  TAKE 1 TABLET BY MOUTH EVERY DAY     felodipine 2.5 MG Tb24  Commonly known as: PLENDIL  Take 2 tablets (5 mg total) by mouth once daily.     glucosamine-chondroitin 500-400 mg tablet  Take 1 tablet by mouth once daily.     labetaloL 100 MG tablet  Commonly known as: NORMODYNE  Take 1.5 tablets (150 mg total) by mouth every 12 (twelve) hours.     lactase 3,000 unit tablet  Commonly known as: LACTAID  Take 1 tablet by mouth 3 (three) times daily as needed.     LIDOcaine 5 %  Commonly known as: LIDODERM  Place 1 patch onto the skin once daily. Leave on up to 12 hours and then off 12 hours before applying the next patch     LORazepam 0.5 MG tablet  Commonly known as:  ATIVAN  TAKE 1/2 TO 1 TABLET BY MOUTH EVERY 12 HOURS AS NEEDED FOR ANXIETY     MAPAP ARTHRITIS PAIN 650 MG Tbsr  Generic drug: acetaminophen  Take 1 tablet (650 mg total) by mouth every 8 (eight) hours as needed.     multivitamin capsule  Take 1 capsule by mouth once daily.     omeprazole 20 MG tablet  Commonly known as: PRILOSEC OTC  Take 20 mg by mouth once daily.     ondansetron 4 MG Tbdl  Commonly known as: ZOFRAN-ODT  Take 1 tablet (4 mg total) by mouth every 6 (six) hours as needed (nausea).     polymyxin B sulf-trimethoprim 10,000 unit- 1 mg/mL Drop  Commonly known as: POLYTRIM  Place 1 drop into both eyes every 6 (six) hours.     pravastatin 40 MG tablet  Commonly known as: PRAVACHOL  TAKE 1 TABLET BY MOUTH EVERY DAY     tiZANidine 4 MG tablet  Commonly known as: ZANAFLEX  TAKE 1 TABLET EVERY 8 HOURS AS NEEDED (NO DRIVING/OPERATING HEAVY MACHINERY. MAY CAUSE DROWSINESS.).     TURMERIC ORAL  Take 500 mg by mouth once daily.     vitamin D 1000 units Tab  Commonly known as: VITAMIN D3  Take 1,000 Units by mouth once daily.           Discharge Procedure Orders (must include Diet, Follow-up, Activity)   Diet Adult Regular     No driving until:   Order Comments: If you received sedation, no driving for 12 hrs     Remove dressing in 24 hours     Notify your health care provider if you experience any of the following:  temperature >100.4     Notify your health care provider if you experience any of the following:  severe uncontrolled pain     Notify your health care provider if you experience any of the following:  redness, tenderness, or signs of infection (pain, swelling, redness, odor or green/yellow discharge around incision site)     Notify your health care provider if you experience any of the following:  difficulty breathing or increased cough     Notify your health care provider if you experience any of the following:  severe persistent headache     Notify your health care provider if you experience any of  the following:  increased confusion or weakness     Activity as tolerated       Himanshu Blackmon Jr, MD  Interventional Pain Medicine / Anesthesiology

## 2022-01-04 NOTE — PROGRESS NOTES
Pt denies pain or discomfort @ this time. Pt states she is ready to be discharged home @ this time. PIV dc'd with tip intact. Dressing placed. Discharge instructions reviewed with patient, with time allotted for questions. Pt verbalizes understanding of instructions and states they have no further questions @ this time. Procedure site is clean, dry and intact. Band-aids in place. Vital signs are stable. No acute distress noted. Staff is at bedside to assist patient. Wheeled to discharge area. Home with family in private vehicle.

## 2022-01-04 NOTE — INTERVAL H&P NOTE
No significant changes were noted from the H&P or last clinic note.    The risks and benefits of this intervention, and alternative therapies were discussed with the patient.  The discussion of risks included infection, bleeding, need for additional procedures or surgery, nerve damage, paralysis, adverse medication reaction(s), stroke, and/or death.  Questions regarding the procedure, risks, expected outcome, and possible side effects were solicited and answered to the patient's satisfaction.  Gerda Lai wishes to proceed with the injection or procedure.  Written consent was obtained.    Himanshu Blackmon Jr, MD  Interventional Pain Medicine / Anesthesiology

## 2022-01-05 ENCOUNTER — TELEPHONE (OUTPATIENT)
Dept: PAIN MEDICINE | Facility: CLINIC | Age: 87
End: 2022-01-05
Payer: MEDICARE

## 2022-01-05 NOTE — TELEPHONE ENCOUNTER
Medial Branch Follow Up Phone Call    Procedure: Cervical MBB  Procedure Date: 01/04/22    The patient was contacted via telephone after the medial branch block to assess post-operative pain scores.  The following was reported.    Pre-Procedure Pain Score: 3-4/10  Pain Score 30 minutes after injection: 0/10  Lowest Pain Score after injection: 0/10  How long did the relief last: 24 hours  Current Pain Score: 0/10    Overall % relief: 95%    Notes:

## 2022-01-05 NOTE — TELEPHONE ENCOUNTER
----- Message from Jennifer Gorman MA sent at 12/23/2021  2:00 PM CST -----  Lucian mars/juan m encarnacion

## 2022-01-06 ENCOUNTER — OFFICE VISIT (OUTPATIENT)
Dept: INTERNAL MEDICINE | Facility: CLINIC | Age: 87
End: 2022-01-06
Payer: MEDICARE

## 2022-01-06 VITALS
TEMPERATURE: 98 F | RESPIRATION RATE: 18 BRPM | HEART RATE: 58 BPM | DIASTOLIC BLOOD PRESSURE: 72 MMHG | OXYGEN SATURATION: 97 % | SYSTOLIC BLOOD PRESSURE: 120 MMHG | BODY MASS INDEX: 24.61 KG/M2 | WEIGHT: 138.88 LBS | HEIGHT: 63 IN

## 2022-01-06 DIAGNOSIS — N18.31 STAGE 3A CHRONIC KIDNEY DISEASE: ICD-10-CM

## 2022-01-06 DIAGNOSIS — Z00.00 ANNUAL PHYSICAL EXAM: ICD-10-CM

## 2022-01-06 DIAGNOSIS — I10 HYPERTENSION, ESSENTIAL: Primary | Chronic | ICD-10-CM

## 2022-01-06 DIAGNOSIS — I70.0 AORTIC ATHEROSCLEROSIS: Chronic | ICD-10-CM

## 2022-01-06 DIAGNOSIS — Z12.31 VISIT FOR SCREENING MAMMOGRAM: ICD-10-CM

## 2022-01-06 DIAGNOSIS — Z78.0 MENOPAUSE: ICD-10-CM

## 2022-01-06 DIAGNOSIS — E78.5 HYPERLIPIDEMIA, UNSPECIFIED HYPERLIPIDEMIA TYPE: Chronic | ICD-10-CM

## 2022-01-06 DIAGNOSIS — I10 HYPERTENSION, ESSENTIAL: Chronic | ICD-10-CM

## 2022-01-06 PROCEDURE — 99999 PR PBB SHADOW E&M-EST. PATIENT-LVL V: CPT | Mod: PBBFAC,,, | Performed by: INTERNAL MEDICINE

## 2022-01-06 PROCEDURE — 99215 OFFICE O/P EST HI 40 MIN: CPT | Mod: PBBFAC,PO | Performed by: INTERNAL MEDICINE

## 2022-01-06 PROCEDURE — 99214 PR OFFICE/OUTPT VISIT, EST, LEVL IV, 30-39 MIN: ICD-10-PCS | Mod: S$PBB,,, | Performed by: INTERNAL MEDICINE

## 2022-01-06 PROCEDURE — 99999 PR PBB SHADOW E&M-EST. PATIENT-LVL V: ICD-10-PCS | Mod: PBBFAC,,, | Performed by: INTERNAL MEDICINE

## 2022-01-06 PROCEDURE — 99214 OFFICE O/P EST MOD 30 MIN: CPT | Mod: S$PBB,,, | Performed by: INTERNAL MEDICINE

## 2022-01-06 RX ORDER — LORAZEPAM 0.5 MG/1
TABLET ORAL
Qty: 30 TABLET | Refills: 0 | Status: SHIPPED | OUTPATIENT
Start: 2022-01-06 | End: 2022-02-15

## 2022-01-06 NOTE — PROGRESS NOTES
Subjective:       Patient ID: Gerda Lai is a 88 y.o. female.    Chief Complaint: Follow-up, Rectal Pain (Buttock pain from fall.), and Medication Refill (Lorazepam )    HPI     88 y.o. female with aortic atherosclerosis, stage IIIa CKD here for follow-up of chronic medical conditions.     HTN - Patient is currently on cardura 1 mg, plendil 2.5 mg BID, labetalol 150 mg BID. She does check her BP at home, and it runs not too bad.  She was 147/something the last time dalia checked it. Side effects of medications note: none. Denies headaches, blurred vision, chest pain, shortness of breath, nausea.    HLD - Patient is currently on no current medication.  Her last lipid panel was   Cholesterol   Date Value Ref Range Status   06/04/2021 169 120 - 199 mg/dL Final     Comment:     The National Cholesterol Education Program (NCEP) has set the  following guidelines (reference ranges) for Cholesterol:  Optimal.....................<200 mg/dL  Borderline High.............200-239 mg/dL  High........................> or = 240 mg/dL       Triglycerides   Date Value Ref Range Status   06/04/2021 180 (H) 30 - 150 mg/dL Final     Comment:     The National Cholesterol Education Program (NCEP) has set the  following guidelines (reference values) for triglycerides:  Normal......................<150 mg/dL  Borderline High.............150-199 mg/dL  High........................200-499 mg/dL       HDL   Date Value Ref Range Status   06/04/2021 49 40 - 75 mg/dL Final     Comment:     The National Cholesterol Education Program (NCEP) has set the  following guidelines (reference values) for HDL Cholesterol:  Low...............<40 mg/dL  Optimal...........>60 mg/dL       LDL Cholesterol   Date Value Ref Range Status   06/04/2021 84.0 63.0 - 159.0 mg/dL Final     Comment:     The National Cholesterol Education Program (NCEP) has set the  following guidelines (reference values) for LDL Cholesterol:  Optimal.......................<130  mg/dL  Borderline High...............130-159 mg/dL  High..........................160-189 mg/dL  Very High.....................>190 mg/dL     .  Side effects of the medication: none.    Cholesterol: needs  Vaccines: Influenza - 2021; Tetanus - 2015; Pneumovax - 2015; Prevnar - 2015; Zoster - ; COVID - done  Sexual Screening: not active  STD screening: not active  Eye exam: has eye drops that she is to use 3-4 times a day.  She has seen Dr. Linares.  She has dry eyes.  Done within the last year.  Mammogram: due March 16th  Gyn exam: UTD (done about 1-1.5 yrs ago)  Colonoscopy: 2013  DEXA: 2019, osteopenia    Exercise: she was exercising until a week or so ago.  She fell a week ago.  She was loking for something in her apartment.  Her body turned and her foot did not.  She went down on her butt.  It is getting better, but is worse on her right side than left.  Diet: eats out.  No fast food. Doesn't cook.    Past Medical History:   Diagnosis Date    Anxiety     Arthritis     Basal cell carcinoma 09/2016    right post auricular neck     Breast cancer 1992    right    Cataract     Fibromyalgia     History of measles as a child     Hyperlipidemia     Hypertension     Personal history of colonic polyps     Pneumonia     SCC (squamous cell carcinoma) 2015    R chest    SCC (squamous cell carcinoma) 2016    left medial shoulder    SCC (squamous cell carcinoma) 2017    left knee    Shingles     Squamous cell carcinoma 2015    right forearm    Thyroid disease     Vaginitis      Past Surgical History:   Procedure Laterality Date    ADENOIDECTOMY      BREAST BIOPSY Left     Excisional bx, benign    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    CARPAL TUNNEL RELEASE Right 3/16/2021    Procedure: RELEASE, CARPAL TUNNEL;  Surgeon: Barbara Aldridge MD;  Location[facility];  Service: Orthopedics;  Laterality: Right;    CATARACT EXTRACTION W/  INTRAOCULAR LENS IMPLANT Bilateral     EYE SURGERY      HAND SURGERY       INJECTION OF ANESTHETIC AGENT AROUND MEDIAL BRANCH NERVES INNERVATING CERVICAL FACET JOINT N/A 1/4/2022    Procedure: Cervical Medial Branch Block C5-6, C6-7 (No Sedation);  Surgeon: Himanshu Blackmon Jr., MD;  Location: [facility];  Service: Pain Management;  Laterality: N/A;    INJECTION OF ANESTHETIC AGENT AROUND MEDIAL BRANCH NERVES INNERVATING LUMBAR FACET JOINT Left 11/18/2021    Procedure: Cervical Medial Branch Block Left C5-6, C6-7 (IV Sedation);  Surgeon: Himanshu Blackmon Jr., MD;  Location: [facility];  Service: Pain Management;  Laterality: Left;    JOINT REPLACEMENT Right     ELZBIETA    KNEE ARTHROPLASTY Left 8/10/2020    Procedure: ARTHROPLASTY, KNEE-ADE NEXGEN/CEMENTED TIBIA;  Surgeon: Kalin Pacheco MD;  Location: [facility] OR;  Service: Orthopedics;  Laterality: Left;    thyriodectomy      partial    tonsillectomy      TONSILLECTOMY      TOTAL HIP ARTHROPLASTY      right    Yag  Left      Social History     Socioeconomic History    Marital status: Single   Occupational History     Employer: RETIRED   Tobacco Use    Smoking status: Never Smoker    Smokeless tobacco: Never Used   Substance and Sexual Activity    Alcohol use: Yes     Comment: socially - celebrations only    Drug use: Never    Sexual activity: Not Currently     Review of patient's allergies indicates:   Allergen Reactions    Sulfa (sulfonamide antibiotics) Rash    Clarithromycin Other (See Comments)     Weak, extreme fatigue. dizziness    Flexeril [cyclobenzaprine] Other (See Comments)     Dizziness    Iodine and iodide containing products     Lisinopril Other (See Comments)     Cough and sensation of throat swelling/?angioedema    Losartan Rash    Metoprolol Swelling     Tightness in throat    Tramadol Other (See Comments)     Dizzy and weak    Verapamil (bulk) Palpitations    Voltaren [diclofenac sodium] Other (See Comments)     Drops blood pressure     Gerda Lai had no medications administered  during this visit.    Review of Systems      Objective:      Physical Exam  Vitals reviewed.   Constitutional:       Appearance: She is well-developed and well-nourished.   HENT:      Head: Normocephalic and atraumatic.      Mouth/Throat:      Pharynx: No oropharyngeal exudate.   Eyes:      General: No scleral icterus.        Right eye: No discharge.         Left eye: No discharge.      Extraocular Movements: EOM normal.      Pupils: Pupils are equal, round, and reactive to light.   Neck:      Thyroid: No thyromegaly.      Trachea: No tracheal deviation.   Cardiovascular:      Rate and Rhythm: Normal rate and regular rhythm.      Heart sounds: Normal heart sounds. No murmur heard.  No friction rub. No gallop.    Pulmonary:      Effort: Pulmonary effort is normal. No respiratory distress.      Breath sounds: Normal breath sounds. No wheezing or rales.   Chest:      Chest wall: No tenderness.   Abdominal:      General: Bowel sounds are normal. There is no distension.      Palpations: Abdomen is soft. There is no mass.      Tenderness: There is no abdominal tenderness. There is no guarding or rebound.   Musculoskeletal:         General: No tenderness or edema. Normal range of motion.      Cervical back: Normal range of motion and neck supple.   Skin:     General: Skin is warm and dry.      Coloration: Skin is not pale.      Findings: No erythema or rash.   Neurological:      Mental Status: She is alert and oriented to person, place, and time.   Psychiatric:         Mood and Affect: Mood and affect normal.         Behavior: Behavior normal.         Assessment:       Problem List Items Addressed This Visit     Hypertension, essential - Primary (Chronic)    Relevant Orders    CBC Auto Differential    Comprehensive Metabolic Panel    TSH    Lipid Panel    HLD (hyperlipidemia) (Chronic)    CKD (chronic kidney disease), stage III (Chronic)    Aortic atherosclerosis (Chronic)      Other Visit Diagnoses     Visit for screening  mammogram        Relevant Orders    Mammo Digital Screening Bilat    Menopause        Relevant Orders    DXA Bone Density Spine And Hip    Annual physical exam              Plan:       1.  Hypertension-continue cardura 1 mg, plendil 2.5 mg BID, labetalol 150 mg BID.  2. Hyperlipidemia-continue pravastatin 40 mg.  3. CKD stage 3 a-monitor.  4. Aortic atherosclerosis-monitor.  5. Visit for screening mammogram-check mammogram.  6. Menopause-check DEXA scan.  7. Annual exam-check CBC, CMP, TSH, lipids.  Up-to-date on vaccines.  Discussed diet and exercise.

## 2022-01-07 ENCOUNTER — TELEPHONE (OUTPATIENT)
Dept: OPTOMETRY | Facility: CLINIC | Age: 87
End: 2022-01-07
Payer: MEDICARE

## 2022-01-07 ENCOUNTER — TELEPHONE (OUTPATIENT)
Dept: OPHTHALMOLOGY | Facility: CLINIC | Age: 87
End: 2022-01-07
Payer: MEDICARE

## 2022-01-07 NOTE — TELEPHONE ENCOUNTER
----- Message from Lottie Ellington MA sent at 1/7/2022 10:09 AM CST -----  Regarding: FW: Eye drifting  Contact: Gerda Kline,   This is a current pt of Dr. Linares  Please contact pt and book as needed.   This is not an urgent     Thank you!!!   Srini   ----- Message -----  From: Susana Terry  Sent: 1/7/2022  10:03 AM CST  To: [facility] Ophthalmology Triage  Subject: Eye drifting                                     [facility]  OPHTHALMOLOGY TRIAGE CALL    Date:  1/7/2022   Time:  10:00 AM  Person Calling: Gerda  Phone Number:  [phone] (home)   Call received by:  Susana Terry  Patient in Clinic now:  No  Which eye:  left  Name of Patient's Eye DrJeremy:  Vijay  Date Last Seen:    Disposition of patient: Pt states that her OS goes blurry from time to time and she says she is noticing that the eye is also drifting toward the ear off and on.    For How Long:    Additional Comments:  [phone]

## 2022-01-07 NOTE — TELEPHONE ENCOUNTER
----- Message from Susana Terry sent at 1/7/2022 10:00 AM CST -----  Regarding: Eye drifting  Contact: Gerda  [facility]  OPHTHALMOLOGY TRIAGE CALL    Date:  1/7/2022   Time:  10:00 AM  Person Calling: Gerda  Phone Number:  [phone] (home)   Call received by:  Susanafavian Terry  Patient in Clinic now:  No  Which eye:  left  Name of Patient's Eye :  Vijay  Date Last Seen:    Disposition of patient: Pt states that her OS goes blurry from time to time and she says she is noticing that the eye is also drifting toward the ear off and on.    For How Long:    Additional Comments:  [phone]

## 2022-01-11 ENCOUNTER — PATIENT MESSAGE (OUTPATIENT)
Dept: PAIN MEDICINE | Facility: CLINIC | Age: 87
End: 2022-01-11
Payer: MEDICARE

## 2022-01-12 ENCOUNTER — OFFICE VISIT (OUTPATIENT)
Dept: OPTOMETRY | Facility: CLINIC | Age: 87
End: 2022-01-12
Payer: MEDICARE

## 2022-01-12 ENCOUNTER — LAB VISIT (OUTPATIENT)
Dept: LAB | Facility: HOSPITAL | Age: 87
End: 2022-01-12
Attending: INTERNAL MEDICINE
Payer: MEDICARE

## 2022-01-12 DIAGNOSIS — I10 HYPERTENSION, ESSENTIAL: Chronic | ICD-10-CM

## 2022-01-12 DIAGNOSIS — H04.123 DRY EYES, BILATERAL: Primary | ICD-10-CM

## 2022-01-12 LAB
ALBUMIN SERPL BCP-MCNC: 3.8 G/DL (ref 3.5–5.2)
ALP SERPL-CCNC: 70 U/L (ref 55–135)
ALT SERPL W/O P-5'-P-CCNC: 11 U/L (ref 10–44)
ANION GAP SERPL CALC-SCNC: 9 MMOL/L (ref 8–16)
AST SERPL-CCNC: 20 U/L (ref 10–40)
BASOPHILS # BLD AUTO: 0.06 K/UL (ref 0–0.2)
BASOPHILS NFR BLD: 0.8 % (ref 0–1.9)
BILIRUB SERPL-MCNC: 1 MG/DL (ref 0.1–1)
BUN SERPL-MCNC: 28 MG/DL (ref 8–23)
CALCIUM SERPL-MCNC: 10 MG/DL (ref 8.7–10.5)
CHLORIDE SERPL-SCNC: 107 MMOL/L (ref 95–110)
CHOLEST SERPL-MCNC: 167 MG/DL (ref 120–199)
CHOLEST/HDLC SERPL: 3.1 {RATIO} (ref 2–5)
CO2 SERPL-SCNC: 23 MMOL/L (ref 23–29)
CREAT SERPL-MCNC: 1 MG/DL (ref 0.5–1.4)
DIFFERENTIAL METHOD: ABNORMAL
EOSINOPHIL # BLD AUTO: 0.2 K/UL (ref 0–0.5)
EOSINOPHIL NFR BLD: 3.4 % (ref 0–8)
ERYTHROCYTE [DISTWIDTH] IN BLOOD BY AUTOMATED COUNT: 12.4 % (ref 11.5–14.5)
EST. GFR  (AFRICAN AMERICAN): 58.1 ML/MIN/1.73 M^2
EST. GFR  (NON AFRICAN AMERICAN): 50.4 ML/MIN/1.73 M^2
GLUCOSE SERPL-MCNC: 86 MG/DL (ref 70–110)
HCT VFR BLD AUTO: 35.9 % (ref 37–48.5)
HDLC SERPL-MCNC: 54 MG/DL (ref 40–75)
HDLC SERPL: 32.3 % (ref 20–50)
HGB BLD-MCNC: 11.5 G/DL (ref 12–16)
IMM GRANULOCYTES # BLD AUTO: 0.02 K/UL (ref 0–0.04)
IMM GRANULOCYTES NFR BLD AUTO: 0.3 % (ref 0–0.5)
LDLC SERPL CALC-MCNC: 84.8 MG/DL (ref 63–159)
LYMPHOCYTES # BLD AUTO: 2 K/UL (ref 1–4.8)
LYMPHOCYTES NFR BLD: 27.9 % (ref 18–48)
MCH RBC QN AUTO: 32.3 PG (ref 27–31)
MCHC RBC AUTO-ENTMCNC: 32 G/DL (ref 32–36)
MCV RBC AUTO: 101 FL (ref 82–98)
MONOCYTES # BLD AUTO: 0.8 K/UL (ref 0.3–1)
MONOCYTES NFR BLD: 11.4 % (ref 4–15)
NEUTROPHILS # BLD AUTO: 4 K/UL (ref 1.8–7.7)
NEUTROPHILS NFR BLD: 56.2 % (ref 38–73)
NONHDLC SERPL-MCNC: 113 MG/DL
NRBC BLD-RTO: 0 /100 WBC
PLATELET # BLD AUTO: 258 K/UL (ref 150–450)
PMV BLD AUTO: 9.2 FL (ref 9.2–12.9)
POTASSIUM SERPL-SCNC: 4.2 MMOL/L (ref 3.5–5.1)
PROT SERPL-MCNC: 6.7 G/DL (ref 6–8.4)
RBC # BLD AUTO: 3.56 M/UL (ref 4–5.4)
SODIUM SERPL-SCNC: 139 MMOL/L (ref 136–145)
TRIGL SERPL-MCNC: 141 MG/DL (ref 30–150)
TSH SERPL DL<=0.005 MIU/L-ACNC: 1.82 UIU/ML (ref 0.4–4)
WBC # BLD AUTO: 7.13 K/UL (ref 3.9–12.7)

## 2022-01-12 PROCEDURE — 80053 COMPREHEN METABOLIC PANEL: CPT | Performed by: INTERNAL MEDICINE

## 2022-01-12 PROCEDURE — 99999 PR PBB SHADOW E&M-EST. PATIENT-LVL III: ICD-10-PCS | Mod: PBBFAC,,, | Performed by: OPTOMETRIST

## 2022-01-12 PROCEDURE — 84443 ASSAY THYROID STIM HORMONE: CPT | Performed by: INTERNAL MEDICINE

## 2022-01-12 PROCEDURE — 85025 COMPLETE CBC W/AUTO DIFF WBC: CPT | Performed by: INTERNAL MEDICINE

## 2022-01-12 PROCEDURE — 92012 PR EYE EXAM, EST PATIENT,INTERMED: ICD-10-PCS | Mod: S$PBB,,, | Performed by: OPTOMETRIST

## 2022-01-12 PROCEDURE — 80061 LIPID PANEL: CPT | Performed by: INTERNAL MEDICINE

## 2022-01-12 PROCEDURE — 99999 PR PBB SHADOW E&M-EST. PATIENT-LVL III: CPT | Mod: PBBFAC,,, | Performed by: OPTOMETRIST

## 2022-01-12 PROCEDURE — 99213 OFFICE O/P EST LOW 20 MIN: CPT | Mod: PBBFAC | Performed by: OPTOMETRIST

## 2022-01-12 PROCEDURE — 92012 INTRM OPH EXAM EST PATIENT: CPT | Mod: S$PBB,,, | Performed by: OPTOMETRIST

## 2022-01-12 PROCEDURE — 36415 COLL VENOUS BLD VENIPUNCTURE: CPT | Mod: PO | Performed by: INTERNAL MEDICINE

## 2022-01-12 NOTE — PROGRESS NOTES
"HPI     SOY - 7/2/2021    Presents today for transient blurry vision OU--started in the left eye.  Systane gtts    Last edited by Luis Daniel Linares, OD on 1/12/2022  4:18 PM. (History)        ROS     Positive for: Eyes (cat surgery OU)    Negative for: Constitutional, Gastrointestinal, Neurological, Skin,   Genitourinary, Musculoskeletal, HENT, Endocrine, Cardiovascular,   Respiratory, Psychiatric, Allergic/Imm, Heme/Lymph    Last edited by Luis Daniel Linares, OD on 1/12/2022  4:18 PM. (History)        Assessment /Plan     For exam results, see Encounter Report.    Dry eyes, bilateral    see notes from 6 mos ago:  1. Mild pco OD sp pciol ou/YAG OS--pt happy w spex  2. DAMION/rosacea hx. Discussed w pt transient blur indicates need for more drops!    Pt presents TODAY w transient blur/burning--AGAIN discussed need for ATs QID!  Also discussed frequent breaks when reading    Pt ALSO reports that twice she has looked in the mirror and it seemed her eye was "drifting out"--once on the right eye, and once on the left eye.  No diplopia.  Pt ortho today.  Since not bothersome will monitor    PLAN:    rtc as sched in July for full exam, or pt will rtc immediately if inc "drift" or diplopia!               "

## 2022-01-20 ENCOUNTER — PATIENT MESSAGE (OUTPATIENT)
Dept: PAIN MEDICINE | Facility: CLINIC | Age: 87
End: 2022-01-20
Payer: MEDICARE

## 2022-01-20 ENCOUNTER — TELEPHONE (OUTPATIENT)
Dept: PAIN MEDICINE | Facility: CLINIC | Age: 87
End: 2022-01-20
Payer: MEDICARE

## 2022-01-20 DIAGNOSIS — M47.812 CERVICAL SPONDYLOSIS: Primary | ICD-10-CM

## 2022-01-20 NOTE — TELEPHONE ENCOUNTER
Patient is reporting 95% relief from the second of 2 medial branch blocks.  This response marks the second successful MBB, therefore we will proceed with Radiofrequency Ablation at LEFT C5-6 and C6-7 with IV moderate sedation.    Himanshu Blackmon Jr, MD  Interventional Pain Medicine / Anesthesiology

## 2022-01-20 NOTE — TELEPHONE ENCOUNTER
Patient has been scheduled for procedure on 1/27. Time of arrival 915 am.  Confirms blood thinners  Denies pacemaker/ICD      · Check in at the registration desk on the first floor of the hospital. [address] [place] [address]  · Please DO NOT eat or drink 8 hours prior to your arrival time for the procedure, if diabetic 6 hours prior. If you are taking medications for blood pressure, heart medications, thyroid, cholesterol; you can take them with a small sip of water.  · Refrain from drinking alcohol within 24 hours prior to your procedure  · You will need someone to drive you home after procedure. You cannot take taxi, Uber, or Lyft.  · If you start feeling sick (fever, chills, or coughing) or start on any antibiotics please contact us at [phone] to reschedule.     Pt voiced understanding and confirmed procedure date and time.

## 2022-01-21 ENCOUNTER — TELEPHONE (OUTPATIENT)
Dept: PAIN MEDICINE | Facility: CLINIC | Age: 87
End: 2022-01-21
Payer: MEDICARE

## 2022-01-21 NOTE — TELEPHONE ENCOUNTER
"I informed pt per Dr. Camarena "okay to hold asa 5 days prior to the procedure and restart the day after". Pt voiced understanding.  "

## 2022-01-21 NOTE — TELEPHONE ENCOUNTER
----- Message from Lizeth Camarena MD sent at 1/20/2022  4:20 PM CST -----  Regarding: RE: med clearance  Cleared to hold aspirin for 5 days and resume the day after the procedure.  HKD  ----- Message -----  From: Leigh Strickland MA  Sent: 1/20/2022   3:55 PM CST  To: Lizeth Camarena MD, #  Subject: med clearance                                    We are requesting clearance to hold Blood Thinners: Aspirin for 5 days. Pt is scheduled for a Left Cervical RFA with Dr. Bernardo on 1/27/22.     Please adivse.      AR

## 2022-01-24 ENCOUNTER — OFFICE VISIT (OUTPATIENT)
Dept: URGENT CARE | Facility: CLINIC | Age: 87
End: 2022-01-24
Payer: MEDICARE

## 2022-01-24 VITALS
HEART RATE: 88 BPM | SYSTOLIC BLOOD PRESSURE: 157 MMHG | TEMPERATURE: 99 F | BODY MASS INDEX: 24.45 KG/M2 | OXYGEN SATURATION: 95 % | RESPIRATION RATE: 16 BRPM | HEIGHT: 63 IN | DIASTOLIC BLOOD PRESSURE: 80 MMHG | WEIGHT: 138 LBS

## 2022-01-24 DIAGNOSIS — B96.89 BACTERIAL SINUSITIS: ICD-10-CM

## 2022-01-24 DIAGNOSIS — R05.9 COUGH: ICD-10-CM

## 2022-01-24 DIAGNOSIS — Z11.59 ENCOUNTER FOR SCREENING FOR OTHER VIRAL DISEASES: Primary | ICD-10-CM

## 2022-01-24 DIAGNOSIS — J32.9 BACTERIAL SINUSITIS: ICD-10-CM

## 2022-01-24 LAB
CTP QC/QA: YES
SARS-COV-2 RDRP RESP QL NAA+PROBE: NEGATIVE

## 2022-01-24 PROCEDURE — U0002 COVID-19 LAB TEST NON-CDC: HCPCS | Mod: QW,CR,S$GLB, | Performed by: FAMILY MEDICINE

## 2022-01-24 PROCEDURE — 99213 OFFICE O/P EST LOW 20 MIN: CPT | Mod: S$GLB,,, | Performed by: FAMILY MEDICINE

## 2022-01-24 PROCEDURE — 71046 X-RAY EXAM CHEST 2 VIEWS: CPT | Mod: FY,S$GLB,, | Performed by: RADIOLOGY

## 2022-01-24 PROCEDURE — U0002: ICD-10-PCS | Mod: QW,CR,S$GLB, | Performed by: FAMILY MEDICINE

## 2022-01-24 PROCEDURE — 99213 PR OFFICE/OUTPT VISIT, EST, LEVL III, 20-29 MIN: ICD-10-PCS | Mod: S$GLB,,, | Performed by: FAMILY MEDICINE

## 2022-01-24 PROCEDURE — 71046 XR CHEST PA AND LATERAL: ICD-10-PCS | Mod: FY,S$GLB,, | Performed by: RADIOLOGY

## 2022-01-24 RX ORDER — BENZONATATE 200 MG/1
200 CAPSULE ORAL 3 TIMES DAILY PRN
Qty: 30 CAPSULE | Refills: 0 | Status: SHIPPED | OUTPATIENT
Start: 2022-01-24 | End: 2022-06-21

## 2022-01-24 RX ORDER — AMOXICILLIN AND CLAVULANATE POTASSIUM 875; 125 MG/1; MG/1
1 TABLET, FILM COATED ORAL EVERY 12 HOURS
Qty: 14 TABLET | Refills: 0 | Status: SHIPPED | OUTPATIENT
Start: 2022-01-24 | End: 2022-01-31

## 2022-01-24 NOTE — PROGRESS NOTES
"Subjective:       Patient ID: Gerda Lai is a 88 y.o. female.    Vitals:  height is 5' 3" (1.6 m) and weight is 62.6 kg (138 lb). Her oral temperature is 98.6 °F (37 °C). Her blood pressure is 157/80 (abnormal) and her pulse is 88. Her respiration is 16 and oxygen saturation is 95%.     Chief Complaint: Cough    Patient reports productive cough for colored sputum for 1 week.Patient reports she is covid vaccinated and had had the booster also. No exposure. No fever, cp, sob. Mild nasal secretions. Tried flonase. Feels a little different than normal sinus infection for her, would like cxr. Cough worsening.    Cough  This is a new problem. The current episode started 1 to 4 weeks ago. The problem has been gradually worsening. The problem occurs constantly. The cough is productive of sputum. Associated symptoms include postnasal drip. Pertinent negatives include no fever, headaches, nasal congestion or shortness of breath. The symptoms are aggravated by lying down. She has tried nothing for the symptoms. There is no history of asthma or bronchitis.       Constitution: Negative for fever.   HENT: Positive for postnasal drip.    Respiratory: Positive for cough and sputum production. Negative for chest tightness and shortness of breath.    Neurological: Negative for headaches.       Objective:      Physical Exam   Constitutional: She is oriented to person, place, and time. She appears well-developed and well-nourished. She is cooperative.  Non-toxic appearance. She does not have a sickly appearance. She does not appear ill. No distress.   HENT:   Head: Normocephalic and atraumatic.   Ears:   Right Ear: Hearing, tympanic membrane, external ear and ear canal normal. Tympanic membrane is not erythematous. No middle ear effusion.   Left Ear: Hearing, tympanic membrane, external ear and ear canal normal. Tympanic membrane is not erythematous.  No middle ear effusion.   Nose: Nose normal. No mucosal edema, rhinorrhea or " nasal deformity. No epistaxis. Right sinus exhibits no maxillary sinus tenderness and no frontal sinus tenderness. Left sinus exhibits no maxillary sinus tenderness and no frontal sinus tenderness.   Mouth/Throat: Uvula is midline, oropharynx is clear and moist and mucous membranes are normal. No trismus in the jaw. Normal dentition. No uvula swelling. No oropharyngeal exudate, posterior oropharyngeal edema, posterior oropharyngeal erythema, tonsillar abscesses or cobblestoning.   Eyes: Conjunctivae and lids are normal. No scleral icterus.   Neck: Trachea normal and phonation normal. Neck supple. No edema present. No erythema present. No neck rigidity present.   Cardiovascular: Normal rate, regular rhythm, normal heart sounds, intact distal pulses and normal pulses.   Pulmonary/Chest: Effort normal and breath sounds normal. No accessory muscle usage. No tachypnea. No respiratory distress. She has no decreased breath sounds. She has no wheezes. She has no rhonchi. She has no rales.   NAD able to speak in clear complete sentences without difficulty      Comments: NAD able to speak in clear complete sentences without difficulty      Abdominal: Normal appearance.   Musculoskeletal: Normal range of motion.         General: No deformity or edema. Normal range of motion.   Neurological: She is alert and oriented to person, place, and time. She exhibits normal muscle tone. Coordination normal.   Skin: Skin is warm, dry, intact, not diaphoretic and not pale.   Psychiatric: She has a normal mood and affect. Her speech is normal and behavior is normal. Judgment and thought content normal. Cognition and memory  Nursing note and vitals reviewed.        Results for orders placed or performed in visit on 01/24/22   POCT COVID-19 Rapid Screening   Result Value Ref Range    POC Rapid COVID Negative Negative     Acceptable Yes      *Note: Due to a large number of results and/or encounters for the requested time period,  some results have not been displayed. A complete set of results can be found in Results Review.     XR CHEST PA AND LATERAL    Result Date: 1/24/2022  EXAMINATION: XR CHEST PA AND LATERAL CLINICAL HISTORY: Cough, unspecified TECHNIQUE: PA and lateral views of the chest were performed. COMPARISON: 1.  Coarse right chest wall calcifications, stable since CT 07/14/2016.  2. FINDINGS: Again seen are coarse calcifications in the right chest wall, seen on the prior CT is being localized to the right breast. No new infiltrates or pleural effusions are seen. The heart size appears normal.  There is mild calcification of the aortic knob consistent with atherosclerotic stigmata. There are multilevel degenerative changes of the spine.     1.  Previous upper lung zones ill-defined airspace opacities are no longer seen.  The lungs now appear clear.  2.  Coarse right chest wall calcifications.  3.  Other findings are as above. Electronically signed by: Gini Murray Date:    01/24/2022 Time:    16:34    Assessment:       1. Encounter for screening for other viral diseases    2. Cough    3. Bacterial sinusitis          Plan:         Encounter for screening for other viral diseases  -     POCT COVID-19 Rapid Screening    Cough  -     XR CHEST PA AND LATERAL; Future; Expected date: 01/24/2022  -     benzonatate (TESSALON) 200 MG capsule; Take 1 capsule (200 mg total) by mouth 3 (three) times daily as needed for Cough.  Dispense: 30 capsule; Refill: 0    Bacterial sinusitis  -     amoxicillin-clavulanate 875-125mg (AUGMENTIN) 875-125 mg per tablet; Take 1 tablet by mouth every 12 (twelve) hours. for 7 days  Dispense: 14 tablet; Refill: 0         Cr clearance 38.  Discussed sx treatment and f/u precautions    Patient Instructions   PLEASE READ YOUR DISCHARGE INSTRUCTIONS ENTIRELY AS IT CONTAINS IMPORTANT INFORMATION.      Please drink plenty of fluids.    Please get plenty of rest.    Please return here or go to the Emergency  Department for any concerns or worsening of condition.    Please take an over the counter antihistamine medication (allegra/Claritin/Zyrtec) of your choice as directed.      If you do have Hypertension or palpitations, it is safe to take Coricidin HBP for relief of sinus symptoms.    If not allergic, please take over the counter Tylenol (Acetaminophen) and/or Motrin (Ibuprofen) as directed for control of pain and/or fever.  Please follow up with your primary care doctor or specialist as needed.    Sore throat recommendations: Warm fluids, warm salt water gargles, throat lozenges, tea, honey, soup, rest, hydration.    Use over the counter flonase: one spray each nostril twice daily OR two sprays each nostril once daily.     Sinus rinses DO NOT USE TAP WATER, if you must, water must be a rolling boil for 1 minute, let it cool, then use.  May use distilled water, or over the counter nasal saline rinses.  Vics vapor rub in shower to help open nasal passages.  May use nasal gel to keep passages moisturized.  May use Nasal saline sprays during the day for added relief of congestion.   For those who go to the gym, please do not use the sauna or steam room now to clear sinuses.    If you  smoke, please stop smoking.    Please take your antibiotics to completion. Take a probiotic or eat yogurt daily with the medication. Take with a meal. Stop the medication and call the clinic or your primary care provider if you develop severe watery diarrhea, severe abdominal pain, blood in your stool.     Please return or see your primary care doctor if you develop new or worsening symptoms.     Please arrange follow up with your primary medical clinic as soon as possible. You must understand that you've received an Urgent Care treatment only and that you may be released before all of your medical problems are known or treated. You, the patient, will arrange for follow up as instructed. If your symptoms worsen or fail to improve you  should go to the Emergency Room.  Patient Education       Sinusitis, Adult ED   General Information   You came to the Emergency Department (ED) for sinusitis. Your sinuses are hollow areas in the bones of your face. They have a thin lining that normally makes a small amount of mucus. When you have sinusitis, the lining gets swollen and makes extra mucus. You may have sinusitis with or after a cold. Most of the time sinusitis will get better in 1 to 2 weeks.  Sinusitis is most often caused by a virus, so antibiotics wont help. But some people do need antibiotics. If the doctor ordered antibiotics for you, be sure to follow the instructions. It is important to take all of your antibiotics even if you start to feel better.  What care is needed at home?   · Call your regular doctor to let them know you were in the ED. Make a follow-up appointment if you were told to.  · Try to thin the mucus.  ? Drink lots of liquids to stay hydrated.  ? Use a cool mist humidifier to avoid dry air.  ? Use saline nose drops or a saline nose rinse to relieve stuffiness.  · Wash your hands often. This will help keep others healthy.  · Do not smoke or be in smoke-filled places. Avoid things that may cause breathing problems like fumes, pollution, dust, and other common allergens and irritants.  · You may want to take medicine like ibuprofen, naproxen, or acetaminophen to help with pain.  When do I need to get emergency help?   · Return to the ED if:   ? You have a stiff neck, especially if you also have fever, chills, vomiting, or severe headache  ? You have trouble thinking clearly.  ? You have trouble seeing or have double vision.  ? You have swelling or redness or pain around one or both eyes.  ? You have a fever of 102°F (38.9°C) or higher, or have shaking chills or sweats.  When do I need to call the doctor?   · You have an upset stomach and throwing up.  · You have more pain in your face and head.  · You are not getting better within  1 to 2 weeks.  · You have new or worsening symptoms.  Last Reviewed Date   2020-08-03  Consumer Information Use and Disclaimer   This information is not specific medical advice and does not replace information you receive from your health care provider. This is only a brief summary of general information. It does NOT include all information about conditions, illnesses, injuries, tests, procedures, treatments, therapies, discharge instructions or life-style choices that may apply to you. You must talk with your health care provider for complete information about your health and treatment options. This information should not be used to decide whether or not to accept your health care providers advice, instructions or recommendations. Only your health care provider has the knowledge and training to provide advice that is right for you.  Copyright   Copyright © 2021 [employer] and its affiliates and/or licensors. All rights reserved.

## 2022-01-24 NOTE — PATIENT INSTRUCTIONS
PLEASE READ YOUR DISCHARGE INSTRUCTIONS ENTIRELY AS IT CONTAINS IMPORTANT INFORMATION.      Please drink plenty of fluids.    Please get plenty of rest.    Please return here or go to the Emergency Department for any concerns or worsening of condition.    Please take an over the counter antihistamine medication (allegra/Claritin/Zyrtec) of your choice as directed.      If you do have Hypertension or palpitations, it is safe to take Coricidin HBP for relief of sinus symptoms.    If not allergic, please take over the counter Tylenol (Acetaminophen) and/or Motrin (Ibuprofen) as directed for control of pain and/or fever.  Please follow up with your primary care doctor or specialist as needed.    Sore throat recommendations: Warm fluids, warm salt water gargles, throat lozenges, tea, honey, soup, rest, hydration.    Use over the counter flonase: one spray each nostril twice daily OR two sprays each nostril once daily.     Sinus rinses DO NOT USE TAP WATER, if you must, water must be a rolling boil for 1 minute, let it cool, then use.  May use distilled water, or over the counter nasal saline rinses.  Vics vapor rub in shower to help open nasal passages.  May use nasal gel to keep passages moisturized.  May use Nasal saline sprays during the day for added relief of congestion.   For those who go to the gym, please do not use the sauna or steam room now to clear sinuses.    If you  smoke, please stop smoking.    Please take your antibiotics to completion. Take a probiotic or eat yogurt daily with the medication. Take with a meal. Stop the medication and call the clinic or your primary care provider if you develop severe watery diarrhea, severe abdominal pain, blood in your stool.     Please return or see your primary care doctor if you develop new or worsening symptoms.     Please arrange follow up with your primary medical clinic as soon as possible. You must understand that you've received an Urgent Care treatment only  and that you may be released before all of your medical problems are known or treated. You, the patient, will arrange for follow up as instructed. If your symptoms worsen or fail to improve you should go to the Emergency Room.  Patient Education       Sinusitis, Adult ED   General Information   You came to the Emergency Department (ED) for sinusitis. Your sinuses are hollow areas in the bones of your face. They have a thin lining that normally makes a small amount of mucus. When you have sinusitis, the lining gets swollen and makes extra mucus. You may have sinusitis with or after a cold. Most of the time sinusitis will get better in 1 to 2 weeks.  Sinusitis is most often caused by a virus, so antibiotics wont help. But some people do need antibiotics. If the doctor ordered antibiotics for you, be sure to follow the instructions. It is important to take all of your antibiotics even if you start to feel better.  What care is needed at home?   · Call your regular doctor to let them know you were in the ED. Make a follow-up appointment if you were told to.  · Try to thin the mucus.  ? Drink lots of liquids to stay hydrated.  ? Use a cool mist humidifier to avoid dry air.  ? Use saline nose drops or a saline nose rinse to relieve stuffiness.  · Wash your hands often. This will help keep others healthy.  · Do not smoke or be in smoke-filled places. Avoid things that may cause breathing problems like fumes, pollution, dust, and other common allergens and irritants.  · You may want to take medicine like ibuprofen, naproxen, or acetaminophen to help with pain.  When do I need to get emergency help?   · Return to the ED if:   ? You have a stiff neck, especially if you also have fever, chills, vomiting, or severe headache  ? You have trouble thinking clearly.  ? You have trouble seeing or have double vision.  ? You have swelling or redness or pain around one or both eyes.  ? You have a fever of 102°F (38.9°C) or higher, or  have shaking chills or sweats.  When do I need to call the doctor?   · You have an upset stomach and throwing up.  · You have more pain in your face and head.  · You are not getting better within 1 to 2 weeks.  · You have new or worsening symptoms.  Last Reviewed Date   2020-08-03  Consumer Information Use and Disclaimer   This information is not specific medical advice and does not replace information you receive from your health care provider. This is only a brief summary of general information. It does NOT include all information about conditions, illnesses, injuries, tests, procedures, treatments, therapies, discharge instructions or life-style choices that may apply to you. You must talk with your health care provider for complete information about your health and treatment options. This information should not be used to decide whether or not to accept your health care providers advice, instructions or recommendations. Only your health care provider has the knowledge and training to provide advice that is right for you.  Copyright   Copyright © 2021 [employer] and its affiliates and/or licensors. All rights reserved.

## 2022-01-25 ENCOUNTER — TELEPHONE (OUTPATIENT)
Dept: PAIN MEDICINE | Facility: CLINIC | Age: 87
End: 2022-01-25
Payer: MEDICARE

## 2022-01-25 NOTE — TELEPHONE ENCOUNTER
----- Message from Tamia Rader sent at 1/25/2022  3:44 PM CST -----  Contact: [phone]  Who Called: PT  Regarding: appt questions    Would the patient rather a call back or a response via [employer]? Call back  Best Call Back Number: [phone]   Additional Information:

## 2022-01-25 NOTE — TELEPHONE ENCOUNTER
Spoke with patient, informed that she would need to reschedule her procedure.     She is unsure as to how long the antibiotics treatment is for; she has not picked up Rx- will send msg to let us know and than we will reschedule at that time.    Pt voiced understanding.   ER

## 2022-01-28 ENCOUNTER — HOSPITAL ENCOUNTER (OUTPATIENT)
Dept: RADIOLOGY | Facility: HOSPITAL | Age: 87
Discharge: HOME | End: 2022-01-28
Attending: INTERNAL MEDICINE
Payer: MEDICARE

## 2022-01-28 DIAGNOSIS — Z12.31 ENCOUNTER FOR SCREENING MAMMOGRAM FOR BREAST CANCER: ICD-10-CM

## 2022-01-28 DIAGNOSIS — Z12.31 VISIT FOR SCREENING MAMMOGRAM: ICD-10-CM

## 2022-01-28 PROCEDURE — 77067 MAMMO DIGITAL SCREENING BILAT WITH TOMO: ICD-10-PCS | Mod: 26,,, | Performed by: RADIOLOGY

## 2022-01-28 PROCEDURE — 77063 MAMMO DIGITAL SCREENING BILAT WITH TOMO: ICD-10-PCS | Mod: 26,,, | Performed by: RADIOLOGY

## 2022-01-28 PROCEDURE — 77063 BREAST TOMOSYNTHESIS BI: CPT | Mod: TC,PO

## 2022-01-28 PROCEDURE — 77067 SCR MAMMO BI INCL CAD: CPT | Mod: TC,PO

## 2022-01-28 PROCEDURE — 77063 BREAST TOMOSYNTHESIS BI: CPT | Mod: 26,,, | Performed by: RADIOLOGY

## 2022-01-28 PROCEDURE — 77067 SCR MAMMO BI INCL CAD: CPT | Mod: 26,,, | Performed by: RADIOLOGY

## 2022-02-02 ENCOUNTER — OFFICE VISIT (OUTPATIENT)
Dept: URGENT CARE | Facility: CLINIC | Age: 87
End: 2022-02-02
Payer: MEDICARE

## 2022-02-02 VITALS
TEMPERATURE: 99 F | OXYGEN SATURATION: 96 % | SYSTOLIC BLOOD PRESSURE: 131 MMHG | HEIGHT: 63 IN | HEART RATE: 56 BPM | BODY MASS INDEX: 24.45 KG/M2 | DIASTOLIC BLOOD PRESSURE: 69 MMHG | RESPIRATION RATE: 18 BRPM | WEIGHT: 138 LBS

## 2022-02-02 DIAGNOSIS — R05.9 COUGH: Primary | ICD-10-CM

## 2022-02-02 DIAGNOSIS — J20.9 ACUTE BRONCHITIS DUE TO INFECTION: ICD-10-CM

## 2022-02-02 LAB
CTP QC/QA: YES
SARS-COV-2 RDRP RESP QL NAA+PROBE: NEGATIVE

## 2022-02-02 PROCEDURE — 99213 PR OFFICE/OUTPT VISIT, EST, LEVL III, 20-29 MIN: ICD-10-PCS | Mod: S$GLB,,, | Performed by: FAMILY MEDICINE

## 2022-02-02 PROCEDURE — U0002 COVID-19 LAB TEST NON-CDC: HCPCS | Mod: QW,CR,S$GLB, | Performed by: FAMILY MEDICINE

## 2022-02-02 PROCEDURE — U0002: ICD-10-PCS | Mod: QW,CR,S$GLB, | Performed by: FAMILY MEDICINE

## 2022-02-02 PROCEDURE — 99213 OFFICE O/P EST LOW 20 MIN: CPT | Mod: S$GLB,,, | Performed by: FAMILY MEDICINE

## 2022-02-02 RX ORDER — DOXYCYCLINE HYCLATE 100 MG
100 TABLET ORAL 2 TIMES DAILY
Qty: 14 TABLET | Refills: 0 | Status: SHIPPED | OUTPATIENT
Start: 2022-02-02 | End: 2022-02-09

## 2022-02-02 NOTE — PROGRESS NOTES
"Subjective:       Patient ID: Greda Lai is a 88 y.o. female.    Vitals:  height is 5' 3" (1.6 m) and weight is 62.6 kg (138 lb). Her temperature is 98.7 °F (37.1 °C). Her blood pressure is 131/69 and her pulse is 56 (abnormal). Her respiration is 18 and oxygen saturation is 96%.     Chief Complaint: Cough    Pt presents today with a chief complaint of coughing.  Pt mentions her symptoms started over 2 week ago and have not improved. CXR last week was negative for pneumonia. Pt completed amoxicillin with partial relief only.  Deneis fever, chills, CP, SOB, HA, body aches, weakness/dizziness, N/V, diarrhea, abdominal pain, dysuria, loss of smell or taste.  Denies known COVID exposure.       Cough  This is a new problem. The current episode started 1 to 4 weeks ago. The problem has been unchanged. The problem occurs every few minutes. The cough is productive of sputum. Associated symptoms include nasal congestion and rhinorrhea. Pertinent negatives include no chest pain, chills, ear congestion, ear pain, fever, headaches, heartburn, hemoptysis, myalgias, postnasal drip, rash, sore throat, shortness of breath, sweats, weight loss or wheezing. Nothing aggravates the symptoms. She has tried OTC cough suppressant for the symptoms. The treatment provided no relief. There is no history of asthma, bronchiectasis, bronchitis, COPD, emphysema, environmental allergies or pneumonia.       Constitution: Negative for chills and fever.   HENT: Negative for ear pain, postnasal drip and sore throat.    Cardiovascular: Negative for chest pain.   Respiratory: Positive for cough. Negative for bloody sputum, shortness of breath and wheezing.    Gastrointestinal: Negative for heartburn.   Musculoskeletal: Negative for muscle ache.   Skin: Negative for rash.   Allergic/Immunologic: Negative for environmental allergies.   Neurological: Negative for headaches.       Objective:      Physical Exam   Constitutional: She is oriented to " person, place, and time. She appears well-developed and well-nourished. She is cooperative.  Non-toxic appearance. She does not appear ill. No distress.   HENT:   Head: Normocephalic and atraumatic.   Ears:   Right Ear: Hearing, tympanic membrane, external ear and ear canal normal. impacted cerumen  Left Ear: Hearing, tympanic membrane, external ear and ear canal normal. impacted cerumen  Nose: Congestion present. No mucosal edema, rhinorrhea or nasal deformity. No epistaxis. Right sinus exhibits no maxillary sinus tenderness and no frontal sinus tenderness. Left sinus exhibits no maxillary sinus tenderness and no frontal sinus tenderness.   Mouth/Throat: Uvula is midline, oropharynx is clear and moist and mucous membranes are normal. No trismus in the jaw. Normal dentition. No uvula swelling.   Eyes: Conjunctivae and lids are normal. Right eye exhibits no discharge. Left eye exhibits no discharge. No scleral icterus.   Neck: Trachea normal and phonation normal. Neck supple.   Cardiovascular: Normal rate, regular rhythm, normal heart sounds, intact distal pulses and normal pulses.   No murmur heard.  Pulmonary/Chest: Effort normal and breath sounds normal. No stridor. No respiratory distress. She has no wheezes. She has no rhonchi. She has no rales.   Abdominal: Normal appearance and bowel sounds are normal. She exhibits no distension and no mass. Soft. There is no abdominal tenderness.   Musculoskeletal: Normal range of motion.         General: No deformity or edema. Normal range of motion.      Cervical back: She exhibits no tenderness.   Lymphadenopathy:     She has no cervical adenopathy.   Neurological: She is alert and oriented to person, place, and time. She exhibits normal muscle tone. Coordination normal.   Skin: Skin is warm, dry, intact, not diaphoretic and not pale.   Psychiatric: She has a normal mood and affect. Her speech is normal and behavior is normal. Judgment and thought content normal.   Nursing  note and vitals reviewed.        Assessment:       1. Cough    2. Acute bronchitis due to infection          Plan:         Cough  -     POCT COVID-19 Rapid Screening    Acute bronchitis due to infection    Other orders  -     doxycycline (VIBRA-TABS) 100 MG tablet; Take 1 tablet (100 mg total) by mouth 2 (two) times daily. for 7 days  Dispense: 14 tablet; Refill: 0        Patient Education       Acute Bronchitis Discharge Instructions, Adult   About this topic   Acute bronchitis is an infection of the bronchi which are tubes that carry air into your lungs. Most of the time, this infection is caused by a virus so an antibiotic wont help. Your cough should get better within 2 to 3 weeks, but may take a bit longer.     What care is needed at home?   · Ask your doctor what you need to do when you go home. Make sure you ask questions if you do not understand what the doctor says.  · Do not smoke or be in smoke-filled places. Avoid other things that may cause breathing problems like fumes, pollution, dust, and other common allergens.  · Drink lots of water, juice, or broth to replace fluids lost in runny nose and fever.  · If you have medicines to take when you are feeling short of breath, be sure to carry them with you. Then, you can take them when needed.  · If you smoke, stop.  · Take warm, steamy showers to help soothe the cough.  · Use a cool mist humidifier. This may make it easier to breathe.  · Use hard candy or cough drops to soothe sore throat and cough.  · Wash your hands often. This will help prevent you from spreading germs to others.  What follow-up care is needed?   · Your doctor may ask you to make visits to the office to check on your progress. Be sure to keep these visits.  · It may take 1 to 3 weeks to feel better.  · Ask your doctor if you need a vaccine to prevent problems from bronchitis.  What drugs may be needed?   Take your drugs as ordered by your doctor. The doctor may order drugs to:  · Make  breathing easier  · Control coughing  · Lower swelling in your airways  · Treat infection  · Control extra mucus  Will physical activity be limited?   Your physical activities may be limited as long as you have the signs of this health problem. Avoid heavy and tiring activities. Talk to your doctor about the right amount of activity for you.  What problems could happen?   · Long-term swelling of the lungs. This is chronic bronchitis.  · Asthma  · Lung infection  · Cough continues even after you feel better  What can be done to prevent this health problem?   · Wash your hands often with soap and water for at least 20 seconds, especially after coughing or sneezing. Alcohol-based hand sanitizers also work to kill germs.       · If you are sick, cover your mouth and nose with tissue when you cough or sneeze. You can also cough into your elbow. Throw away tissues in the trash and wash your hands after touching used tissues.  · Do not get too close (kissing, hugging) to people who are sick.  · Do not share towels or hankies with anyone who is sick.  · Clean commonly handled things like door handles, remotes, toys, and phones. Wipe them with a disinfectant.  · Stay away from crowded places.  · Stop smoking. Stay away from dust and fumes.  · Get a flu shot each year.     When do I need to call the doctor?   · Signs of infection. These include a fever of 100.4°F (38°C) or higher, chills, cough, more sputum or change in color of sputum.  · You are having so much trouble breathing that you can only say one or two words at a time.  · You need to sit upright at all times to be able to breathe and or cannot lie down.  · You have trouble breathing when talking or sitting still.  · You have a fever of 100.4°F (38°C) or higher or chills.  · You have chest pain when you cough, have trouble breathing but can still talk in full sentences, or cough up blood.  · You develop a barking cough.  · You are still coughing in 3 weeks.  Teach  Back: Helping You Understand   The Teach Back Method helps you understand the information we are giving you. After you talk with the staff, tell them in your own words what you learned. This helps to make sure the staff has described each thing clearly. It also helps to explain things that may have been confusing. Before going home, make sure you can do these:  · I can tell you about my condition.  · I can tell you what may help ease my breathing.  · I can tell you what I will do if I have more trouble breathing, it is harder to breathe, or I feel like I am getting less air.  Where can I learn more?   [employer]  https://[email or web address]/acute-[email or web address]   [facility] Choices  https://www.nhs.uk/conditions/bronchitis/   Last Reviewed Date   2021-06-09  Consumer Information Use and Disclaimer   This information is not specific medical advice and does not replace information you receive from your health care provider. This is only a brief summary of general information. It does NOT include all information about conditions, illnesses, injuries, tests, procedures, treatments, therapies, discharge instructions or life-style choices that may apply to you. You must talk with your health care provider for complete information about your health and treatment options. This information should not be used to decide whether or not to accept your health care providers advice, instructions or recommendations. Only your health care provider has the knowledge and training to provide advice that is right for you.  Copyright   Copyright © 2021 [employer] and its affiliates and/or licensors. All rights reserved.       Robitussin Dm can help with cough and congstion

## 2022-02-02 NOTE — PATIENT INSTRUCTIONS
Patient Education       Acute Bronchitis Discharge Instructions, Adult   About this topic   Acute bronchitis is an infection of the bronchi which are tubes that carry air into your lungs. Most of the time, this infection is caused by a virus so an antibiotic wont help. Your cough should get better within 2 to 3 weeks, but may take a bit longer.     What care is needed at home?   · Ask your doctor what you need to do when you go home. Make sure you ask questions if you do not understand what the doctor says.  · Do not smoke or be in smoke-filled places. Avoid other things that may cause breathing problems like fumes, pollution, dust, and other common allergens.  · Drink lots of water, juice, or broth to replace fluids lost in runny nose and fever.  · If you have medicines to take when you are feeling short of breath, be sure to carry them with you. Then, you can take them when needed.  · If you smoke, stop.  · Take warm, steamy showers to help soothe the cough.  · Use a cool mist humidifier. This may make it easier to breathe.  · Use hard candy or cough drops to soothe sore throat and cough.  · Wash your hands often. This will help prevent you from spreading germs to others.  What follow-up care is needed?   · Your doctor may ask you to make visits to the office to check on your progress. Be sure to keep these visits.  · It may take 1 to 3 weeks to feel better.  · Ask your doctor if you need a vaccine to prevent problems from bronchitis.  What drugs may be needed?   Take your drugs as ordered by your doctor. The doctor may order drugs to:  · Make breathing easier  · Control coughing  · Lower swelling in your airways  · Treat infection  · Control extra mucus  Will physical activity be limited?   Your physical activities may be limited as long as you have the signs of this health problem. Avoid heavy and tiring activities. Talk to your doctor about the right amount of activity for you.  What problems could happen?    · Long-term swelling of the lungs. This is chronic bronchitis.  · Asthma  · Lung infection  · Cough continues even after you feel better  What can be done to prevent this health problem?   · Wash your hands often with soap and water for at least 20 seconds, especially after coughing or sneezing. Alcohol-based hand sanitizers also work to kill germs.       · If you are sick, cover your mouth and nose with tissue when you cough or sneeze. You can also cough into your elbow. Throw away tissues in the trash and wash your hands after touching used tissues.  · Do not get too close (kissing, hugging) to people who are sick.  · Do not share towels or hankies with anyone who is sick.  · Clean commonly handled things like door handles, remotes, toys, and phones. Wipe them with a disinfectant.  · Stay away from crowded places.  · Stop smoking. Stay away from dust and fumes.  · Get a flu shot each year.     When do I need to call the doctor?   · Signs of infection. These include a fever of 100.4°F (38°C) or higher, chills, cough, more sputum or change in color of sputum.  · You are having so much trouble breathing that you can only say one or two words at a time.  · You need to sit upright at all times to be able to breathe and or cannot lie down.  · You have trouble breathing when talking or sitting still.  · You have a fever of 100.4°F (38°C) or higher or chills.  · You have chest pain when you cough, have trouble breathing but can still talk in full sentences, or cough up blood.  · You develop a barking cough.  · You are still coughing in 3 weeks.  Teach Back: Helping You Understand   The Teach Back Method helps you understand the information we are giving you. After you talk with the staff, tell them in your own words what you learned. This helps to make sure the staff has described each thing clearly. It also helps to explain things that may have been confusing. Before going home, make sure you can do these:  · I can  tell you about my condition.  · I can tell you what may help ease my breathing.  · I can tell you what I will do if I have more trouble breathing, it is harder to breathe, or I feel like I am getting less air.  Where can I learn more?   American Academy of [employer]icians  https://[email or web address]/acute-[email or web address]   [facility] Choices  https://www.nhs.uk/conditions/bronchitis/   Last Reviewed Date   2021-06-09  Consumer Information Use and Disclaimer   This information is not specific medical advice and does not replace information you receive from your health care provider. This is only a brief summary of general information. It does NOT include all information about conditions, illnesses, injuries, tests, procedures, treatments, therapies, discharge instructions or life-style choices that may apply to you. You must talk with your health care provider for complete information about your health and treatment options. This information should not be used to decide whether or not to accept your health care providers advice, instructions or recommendations. Only your health care provider has the knowledge and training to provide advice that is right for you.  Copyright   Copyright © 2021 [employer] and its affiliates and/or licensors. All rights reserved.       Robitussin Dm can help with cough and congstion

## 2022-02-05 ENCOUNTER — TELEPHONE (OUTPATIENT)
Dept: URGENT CARE | Facility: CLINIC | Age: 87
End: 2022-02-05
Payer: MEDICARE

## 2022-02-05 NOTE — TELEPHONE ENCOUNTER
Called For follow-up and negative.  Patient is feeling much better.  Advised to follow-up with PCP. Answered all questions.

## 2022-02-07 ENCOUNTER — TELEPHONE (OUTPATIENT)
Dept: PAIN MEDICINE | Facility: CLINIC | Age: 87
End: 2022-02-07
Payer: MEDICARE

## 2022-02-07 NOTE — TELEPHONE ENCOUNTER
----- Message from Nancy Blas sent at 2/7/2022  1:39 PM CST -----  Needs advice from nurse:      Who Called:pt  Regarding:patient needs to reschedule procedure due to being sick  Would the patient rather a call back or VIA [facility]?  Best Call Back number:[phone]  Additional Info:

## 2022-02-15 DIAGNOSIS — I10 HYPERTENSION, ESSENTIAL: Chronic | ICD-10-CM

## 2022-02-15 RX ORDER — LORAZEPAM 0.5 MG/1
TABLET ORAL
Qty: 30 TABLET | Refills: 0 | Status: SHIPPED | OUTPATIENT
Start: 2022-02-15 | End: 2022-04-14

## 2022-02-23 ENCOUNTER — OFFICE VISIT (OUTPATIENT)
Dept: DERMATOLOGY | Facility: CLINIC | Age: 87
End: 2022-02-23
Payer: MEDICARE

## 2022-02-23 DIAGNOSIS — Z85.828 PERSONAL HISTORY OF SKIN CANCER: ICD-10-CM

## 2022-02-23 DIAGNOSIS — L82.1 SK (SEBORRHEIC KERATOSIS): ICD-10-CM

## 2022-02-23 DIAGNOSIS — L81.4 LENTIGO: ICD-10-CM

## 2022-02-23 DIAGNOSIS — L73.8 SEBACEOUS GLAND HYPERPLASIA: ICD-10-CM

## 2022-02-23 DIAGNOSIS — L57.0 AK (ACTINIC KERATOSIS): Primary | ICD-10-CM

## 2022-02-23 DIAGNOSIS — L90.5 SCAR: ICD-10-CM

## 2022-02-23 DIAGNOSIS — D22.9 NEVUS: ICD-10-CM

## 2022-02-23 PROCEDURE — 17000 PR DESTRUCTION(LASER SURGERY,CRYOSURGERY,CHEMOSURGERY),PREMALIGNANT LESIONS,FIRST LESION: ICD-10-PCS | Mod: S$PBB,,, | Performed by: DERMATOLOGY

## 2022-02-23 PROCEDURE — 99213 PR OFFICE/OUTPT VISIT, EST, LEVL III, 20-29 MIN: ICD-10-PCS | Mod: 25,S$PBB,, | Performed by: DERMATOLOGY

## 2022-02-23 PROCEDURE — 17003 DESTRUCT PREMALG LES 2-14: CPT | Mod: S$PBB,,, | Performed by: DERMATOLOGY

## 2022-02-23 PROCEDURE — 99213 OFFICE O/P EST LOW 20 MIN: CPT | Mod: 25,S$PBB,, | Performed by: DERMATOLOGY

## 2022-02-23 PROCEDURE — 17003 DESTRUCTION, PREMALIGNANT LESIONS; SECOND THROUGH 14 LESIONS: ICD-10-PCS | Mod: S$PBB,,, | Performed by: DERMATOLOGY

## 2022-02-23 PROCEDURE — 17003 DESTRUCT PREMALG LES 2-14: CPT | Mod: PBBFAC,PO | Performed by: DERMATOLOGY

## 2022-02-23 PROCEDURE — 17000 DESTRUCT PREMALG LESION: CPT | Mod: PBBFAC,PO | Performed by: DERMATOLOGY

## 2022-02-23 PROCEDURE — 99213 OFFICE O/P EST LOW 20 MIN: CPT | Mod: PBBFAC,PO | Performed by: DERMATOLOGY

## 2022-02-23 PROCEDURE — 99999 PR PBB SHADOW E&M-EST. PATIENT-LVL III: ICD-10-PCS | Mod: PBBFAC,,, | Performed by: DERMATOLOGY

## 2022-02-23 PROCEDURE — 99999 PR PBB SHADOW E&M-EST. PATIENT-LVL III: CPT | Mod: PBBFAC,,, | Performed by: DERMATOLOGY

## 2022-02-23 PROCEDURE — 17000 DESTRUCT PREMALG LESION: CPT | Mod: S$PBB,,, | Performed by: DERMATOLOGY

## 2022-02-23 NOTE — PROGRESS NOTES
Subjective:       Patient ID:  Gerda Lai is a 89 y.o. female who presents for   Chief Complaint   Patient presents with    Skin Check     ubse    lesion     L side of neck , and face     Pt present today for UBSE.  C/o of spot on L side of neck . She  States it was itching about 1 week ago. Now has improved  Also C/o spots on face x 1 wk which is not healing and is being tx w/ aquafore.    This is a high risk patient here to check for the development of new lesions.    Pt did have a mammogram 1/2022 for large hard plaque on right breast and was told it was fine.         Review of Systems   Constitutional: Negative for fever, chills and fatigue.   Skin: Positive for daily sunscreen use and activity-related sunscreen use. Negative for recent sunburn.   Hematologic/Lymphatic: Bruises/bleeds easily.        Objective:    Physical Exam   Constitutional: She appears well-developed and well-nourished. No distress.   Neurological: She is alert and oriented to person, place, and time. She is not disoriented.   Psychiatric: She has a normal mood and affect.   Skin:   Areas Examined (abnormalities noted in diagram):   Scalp / Hair Palpated and Inspected  Head / Face Inspection Performed  Neck Inspection Performed  Chest / Axilla Inspection Performed  Abdomen Inspection Performed  Back Inspection Performed  RUE Inspected  LUE Inspection Performed                       Diagram Legend     Erythematous scaling macule/papule c/w actinic keratosis       Vascular papule c/w angioma      Pigmented verrucoid papule/plaque c/w seborrheic keratosis      Yellow umbilicated papule c/w sebaceous hyperplasia      Irregularly shaped tan macule c/w lentigo     1-2 mm smooth white papules consistent with Milia      Movable subcutaneous cyst with punctum c/w epidermal inclusion cyst      Subcutaneous movable cyst c/w pilar cyst      Firm pink to brown papule c/w dermatofibroma      Pedunculated fleshy papule(s) c/w skin tag(s)       Evenly pigmented macule c/w junctional nevus     Mildly variegated pigmented, slightly irregular-bordered macule c/w mildly atypical nevus      Flesh colored to evenly pigmented papule c/w intradermal nevus       Pink pearly papule/plaque c/w basal cell carcinoma      Erythematous hyperkeratotic cursted plaque c/w SCC      Surgical scar with no sign of skin cancer recurrence      Open and closed comedones      Inflammatory papules and pustules      Verrucoid papule consistent consistent with wart     Erythematous eczematous patches and plaques     Dystrophic onycholytic nail with subungual debris c/w onychomycosis     Umbilicated papule    Erythematous-base heme-crusted tan verrucoid plaque consistent with inflamed seborrheic keratosis     Erythematous Silvery Scaling Plaque c/w Psoriasis     See annotation      Assessment / Plan:        AK (actinic keratosis)  Cryosurgery Procedure Note    Verbal consent from the patient is obtained including, but not limited to, risk of hypopigmentation/hyperpigmentation, scar, recurrence of lesion. The patient is aware of the precancerous quality and need for treatment of these lesions. Liquid nitrogen cryosurgery is applied to the 7 actinic keratoses, as detailed in the physical exam, to produce a freeze injury. The patient is aware that blisters may form and is instructed on wound care with gentle cleansing and use of vaseline ointment to keep moist until healed. The patient is supplied a handout on cryosurgery and is instructed to call if lesions do not completely resolve.    Sebaceous gland hyperplasia  This is a common condition representing benign enlargement of the sebaceous lobule. It typically occurs in adulthood. Reassurance given to patient.     SK (seborrheic keratosis)  These are benign inherited growths without a malignant potential. Reassurance given to patient. No treatment is necessary.     Nevus  Discussed ABCDE's of nevi.  Monitor for new mole or moles that are  becoming bigger, darker, irritated, or developing irregular borders. Brochure provided. Instructed patient to observe lesion(s) for changes and follow up in clinic if changes are noted. Patient to monitor skin at home for new or changing lesions.     Lentigo  This is a benign hyperpigmented sun induced lesion. Recommend daily sun protection/avoidance and use of at least SPF 30, broad spectrum sunscreen (OTC drug) will reduce the number of new lesions. Treatment of these benign lesions are considered cosmetic.    The nature of sun-induced photo-aging and skin cancers is discussed.  Sun avoidance, protective clothing, and the use of 30-SPF sunscreens is advised. Observe closely for skin damage/changes, and call if such occurs.    Personal history of skin cancer  Scar  Area(s) of previous NMSC evaluated with no signs of recurrence.    Upper body skin examination performed today including at least 6 points as noted in physical examination. No lesions suspicious for malignancy noted.    Recommend daily sun protection/avoidance and use of at least SPF 30, broad spectrum sunscreen (OTC drug).              Follow up in about 1 year (around 2/23/2023).

## 2022-03-03 NOTE — DISCHARGE INSTRUCTIONS
Home Care Instructions Pain Management:    1.  DIET:    You may resume your normal diet today.    2.  BATHING:    You may shower with luke warm water.    3.  DRESSING:    You may remove your bandage today.    4.  ACTIVITY LEVEL:      You may resume your normal activities 24 hours after your procedure.    5.  MEDICATIONS:    You may resume your normal medications today.    6.  SPECIAL INSTRUCTIONS:    No heat to the injection site for 24 hours including bath or shower, heating pad, moist heat or hot tubs.    Use an ice pack to the injection site for any pain or discomfort.  Apply ice packs for 20 minute intervals as needed.    If you have received any sedatives by mouth today, you can not drive for 12 hours.    If you have received sedation through an IV, you can not drive for 24 hours.    PLEASE CALL YOUR DOCTOR FOR THE FOLLOWIN.  Redness or swelling around the injection site.  2.  Fever of 101 degrees.  3.  Drainage (pus) from the injection site.  4.  For any continuous bleeding (some dried blood over the incision is normal.)    FOR EMERGENCIES:    If any unusual problems or difficulties occur during clinic hours, call [phone] or dial [phone].    Follow up with with your physician in 2-3 weeks.

## 2022-03-07 ENCOUNTER — TELEPHONE (OUTPATIENT)
Dept: PAIN MEDICINE | Facility: CLINIC | Age: 87
End: 2022-03-07
Payer: MEDICARE

## 2022-03-08 ENCOUNTER — HOSPITAL ENCOUNTER (OUTPATIENT)
Facility: HOSPITAL | Age: 87
Discharge: HOME | End: 2022-03-08
Attending: PAIN MEDICINE | Admitting: PAIN MEDICINE
Payer: MEDICARE

## 2022-03-08 VITALS
BODY MASS INDEX: 24.8 KG/M2 | HEIGHT: 63 IN | TEMPERATURE: 98 F | HEART RATE: 58 BPM | DIASTOLIC BLOOD PRESSURE: 63 MMHG | SYSTOLIC BLOOD PRESSURE: 138 MMHG | RESPIRATION RATE: 12 BRPM | OXYGEN SATURATION: 97 % | WEIGHT: 140 LBS

## 2022-03-08 DIAGNOSIS — G89.29 CHRONIC PAIN: ICD-10-CM

## 2022-03-08 DIAGNOSIS — M47.812 CERVICAL SPONDYLOSIS: Primary | ICD-10-CM

## 2022-03-08 LAB
CTP QC/QA: YES
SARS-COV-2 AG RESP QL IA.RAPID: NEGATIVE

## 2022-03-08 PROCEDURE — 99152 MOD SED SAME PHYS/QHP 5/>YRS: CPT | Performed by: PAIN MEDICINE

## 2022-03-08 PROCEDURE — 64634 DESTROY C/TH FACET JNT ADDL: CPT | Performed by: PAIN MEDICINE

## 2022-03-08 PROCEDURE — 64634 PR DESTROY C/TH FACET JNT ADDL: ICD-10-PCS | Mod: LT,,, | Performed by: PAIN MEDICINE

## 2022-03-08 PROCEDURE — 63600175 PHARM REV CODE 636 W HCPCS: Performed by: PAIN MEDICINE

## 2022-03-08 PROCEDURE — 64633 DESTROY CERV/THOR FACET JNT: CPT | Performed by: PAIN MEDICINE

## 2022-03-08 PROCEDURE — 64633 PR DESTROY CERV/THOR FACET JNT: ICD-10-PCS | Mod: LT,,, | Performed by: PAIN MEDICINE

## 2022-03-08 PROCEDURE — 25000003 PHARM REV CODE 250: Performed by: PAIN MEDICINE

## 2022-03-08 PROCEDURE — 64634 DESTROY C/TH FACET JNT ADDL: CPT | Mod: LT,,, | Performed by: PAIN MEDICINE

## 2022-03-08 PROCEDURE — 64633 DESTROY CERV/THOR FACET JNT: CPT | Mod: LT,,, | Performed by: PAIN MEDICINE

## 2022-03-08 RX ORDER — LIDOCAINE HYDROCHLORIDE 10 MG/ML
INJECTION INFILTRATION; PERINEURAL
Status: DISCONTINUED | OUTPATIENT
Start: 2022-03-08 | End: 2022-03-08 | Stop reason: HOSPADM

## 2022-03-08 RX ORDER — METHYLPREDNISOLONE ACETATE 40 MG/ML
INJECTION, SUSPENSION INTRA-ARTICULAR; INTRALESIONAL; INTRAMUSCULAR; SOFT TISSUE
Status: DISCONTINUED | OUTPATIENT
Start: 2022-03-08 | End: 2022-03-08 | Stop reason: HOSPADM

## 2022-03-08 RX ORDER — BUPIVACAINE HYDROCHLORIDE 2.5 MG/ML
INJECTION, SOLUTION EPIDURAL; INFILTRATION; INTRACAUDAL
Status: DISCONTINUED | OUTPATIENT
Start: 2022-03-08 | End: 2022-03-08 | Stop reason: HOSPADM

## 2022-03-08 RX ORDER — INDOMETHACIN 25 MG/1
CAPSULE ORAL
Status: DISCONTINUED | OUTPATIENT
Start: 2022-03-08 | End: 2022-03-08 | Stop reason: HOSPADM

## 2022-03-08 RX ORDER — FENTANYL CITRATE 50 UG/ML
INJECTION, SOLUTION INTRAMUSCULAR; INTRAVENOUS
Status: DISCONTINUED | OUTPATIENT
Start: 2022-03-08 | End: 2022-03-08 | Stop reason: HOSPADM

## 2022-03-08 RX ORDER — LIDOCAINE HYDROCHLORIDE 10 MG/ML
1 INJECTION, SOLUTION EPIDURAL; INFILTRATION; INTRACAUDAL; PERINEURAL ONCE
Status: DISCONTINUED | OUTPATIENT
Start: 2022-03-08 | End: 2022-03-08 | Stop reason: HOSPADM

## 2022-03-08 RX ORDER — LIDOCAINE HYDROCHLORIDE 20 MG/ML
INJECTION, SOLUTION EPIDURAL; INFILTRATION; INTRACAUDAL; PERINEURAL
Status: DISCONTINUED | OUTPATIENT
Start: 2022-03-08 | End: 2022-03-08 | Stop reason: HOSPADM

## 2022-03-08 RX ORDER — SODIUM CHLORIDE 9 MG/ML
500 INJECTION, SOLUTION INTRAVENOUS CONTINUOUS
Status: DISCONTINUED | OUTPATIENT
Start: 2022-03-08 | End: 2022-03-08 | Stop reason: HOSPADM

## 2022-03-08 RX ORDER — MIDAZOLAM HYDROCHLORIDE 1 MG/ML
INJECTION INTRAMUSCULAR; INTRAVENOUS
Status: DISCONTINUED | OUTPATIENT
Start: 2022-03-08 | End: 2022-03-08 | Stop reason: HOSPADM

## 2022-03-08 RX ADMIN — SODIUM CHLORIDE 500 ML: 0.9 INJECTION, SOLUTION INTRAVENOUS at 10:03

## 2022-03-08 NOTE — DISCHARGE SUMMARY
[facility]  Discharge Note  Short Stay     Admit Date: 3/8/2022    Discharge Date: 3/8/2022     Attending Physician: Himanshu Blackmon Jr, MD    Diagnoses:  There are no hospital problems to display for this patient.    Discharged Condition: Good     Hospital Course: Patient was admitted for an outpatient interventional pain management procedure and tolerated the procedure well with no complications.     Final Diagnoses: Same as principal problem.     Disposition: Home or Self Care     Follow up/Patient Instructions:        Reconciled Medications:     Medication List      CONTINUE taking these medications    albuterol 90 mcg/actuation inhaler  Commonly known as: PROVENTIL/VENTOLIN HFA  Inhale 2 puffs into the lungs every 6 (six) hours as needed for Wheezing. Rescue     aliskiren 300 MG Tab  Commonly known as: TEKTURNA  TAKE 1 TABLET BY MOUTH EVERY DAY     aspirin 81 MG EC tablet  Commonly known as: ECOTRIN  Take 1 tablet (81 mg total) by mouth once daily.     benzonatate 200 MG capsule  Commonly known as: TESSALON  Take 1 capsule (200 mg total) by mouth 3 (three) times daily as needed for Cough.     coenzyme Q10 100 mg capsule  Take 100 mg by mouth once daily.     diphenoxylate-atropine 2.5-0.025 mg 2.5-0.025 mg per tablet  Commonly known as: LOMOTIL  Take 1 tablet by mouth 4 (four) times daily as needed for Diarrhea.     doxazosin 1 MG tablet  Commonly known as: CARDURA  TAKE 1 TABLET BY MOUTH EVERY DAY     felodipine 2.5 MG Tb24  Commonly known as: PLENDIL  Take 2 tablets (5 mg total) by mouth once daily.     glucosamine-chondroitin 500-400 mg tablet  Take 1 tablet by mouth once daily.     labetaloL 100 MG tablet  Commonly known as: NORMODYNE  Take 1.5 tablets (150 mg total) by mouth every 12 (twelve) hours.     lactase 3,000 unit tablet  Commonly known as: LACTAID  Take 1 tablet by mouth 3 (three) times daily as needed.     LIDOcaine 5 %  Commonly known as: LIDODERM  Place 1 patch onto the skin once  daily. Leave on up to 12 hours and then off 12 hours before applying the next patch     LORazepam 0.5 MG tablet  Commonly known as: ATIVAN  TAKE 1/2 TO 1 TABLET BY MOUTH EVERY 12 HOURS AS NEEDED FOR ANXIETY     MAPAP ARTHRITIS PAIN 650 MG Tbsr  Generic drug: acetaminophen  Take 1 tablet (650 mg total) by mouth every 8 (eight) hours as needed.     multivitamin capsule  Take 1 capsule by mouth once daily.     omeprazole 20 MG tablet  Commonly known as: PRILOSEC OTC  Take 20 mg by mouth once daily.     ondansetron 4 MG Tbdl  Commonly known as: ZOFRAN-ODT  Take 1 tablet (4 mg total) by mouth every 6 (six) hours as needed (nausea).     polymyxin B sulf-trimethoprim 10,000 unit- 1 mg/mL Drop  Commonly known as: POLYTRIM  Place 1 drop into both eyes every 6 (six) hours.     pravastatin 40 MG tablet  Commonly known as: PRAVACHOL  TAKE 1 TABLET BY MOUTH EVERY DAY     TURMERIC ORAL  Take 500 mg by mouth once daily.     vitamin D 1000 units Tab  Commonly known as: VITAMIN D3  Take 1,000 Units by mouth once daily.        ASK your doctor about these medications    tiZANidine 4 MG tablet  Commonly known as: ZANAFLEX  TAKE 1 TABLET EVERY 8 HOURS AS NEEDED (NO DRIVING/OPERATING HEAVY MACHINERY. MAY CAUSE DROWSINESS.).           Discharge Procedure Orders (must include Diet, Follow-up, Activity)   Diet Adult Regular     No driving until:   Order Comments: If you received sedation, no driving for 12 hrs     Remove dressing in 24 hours     Notify your health care provider if you experience any of the following:  temperature >100.4     Notify your health care provider if you experience any of the following:  severe uncontrolled pain     Notify your health care provider if you experience any of the following:  redness, tenderness, or signs of infection (pain, swelling, redness, odor or green/yellow discharge around incision site)     Notify your health care provider if you experience any of the following:  difficulty breathing or  increased cough     Notify your health care provider if you experience any of the following:  severe persistent headache     Notify your health care provider if you experience any of the following:  increased confusion or weakness     Activity as tolerated       Himanshu Blackmon Jr, MD  Interventional Pain Medicine / Anesthesiology

## 2022-03-08 NOTE — OP NOTE
Procedure Note    Pre-operative diagnosis: Cervical Spondylosis  Post-operative diagnosis: Cervical Spondylosis  Procedure Date: 03/08/2022  Procedure:  (1) Left Cervical Medial Branch Radiofrequency Ablation at C5-6 and C6-7    (2) Procedural Fluoroscopy    (3) IV Moderate Sedation (Midazolam 2 mg, Fentanyl 50 mcg)      Indications: Diagnostic and therapeutic block to treat cervical spondylosis.      Anesthesia: Local    Procedure in detail:  Informed consent was verified and patient understands potential complications including local discomfort, infection, headache, temporary or permanent weakness and/or numbness of one or both arms or legs, temporary or permanent paraplegia, heart attack and stroke. All questions were answered.      The patient was taken to the procedure room and placed in prone position the neck in slight flexion.  Time out was performed.  Patient's neck/cervical spine area was prepped with Chloraprep and draped in a sterile manner.    AP fluoroscopy was used to identify and mp the waists of the mid-articular pillars of the C5, 6, and 7 on the left side.  The skin and subcutaneous tissues overlying the target areas was infiltrated with 1mL of Lidocaine 1% using a 25g 1.5 inch needle.  Then, under AP fluoroscopic guidance, a 20 G 100 mm styletted RF needle was introduced through the anesthetized skin and advanced toward the waist. Once os was encountered, lateral fluoroscopic views were obtained and the RF needle tip was advanced to the centroid of the facets at each level not passing the midpoint.  Placement of the needles was optimized in lateral view.  Stylets were removed and RF probes inserted.    Sensory stimulation was performed at 50 Hz & 0.4V, generating a pressure sensation. Motor stimulation at 2 Hz & 2 V was negative for muscle contractions in the face, arms, or hands. One mL of 2% lidocaine was injected at each level prior to lesioning, which was performed for 90 seconds at 80  degrees Centigrade. Once the lesion was complete, 1 mL of a solution consisting of 2 mL 0.25% bupivacaine and 40 mg triamcinolone was injected through each probe. The probes were removed with a 1% lidocaine flush.      Needle was removed and bandaged placed over site of needle insertion.      Estimated Blood Loss: nil    Specimens: None    Disposition: The patient tolerated the procedure without complaint and was transported to the recovery room in stable condition.    Follow-up: RTC as scheduled      Himanshu Blackmon Jr, MD  Interventional Pain Medicine / Anesthesiology

## 2022-03-08 NOTE — H&P
[facility] (Hospital)  History & Physical - Short Stay  Pain Management           SUBJECTIVE:     Procedure: Procedure(s) (LRB):  RADIOFREQUENCY THERMAL COAGULATION, NERVE, SPINAL, CERVICAL, LEFT C5-6 AND C6-7 (Left)    Chief Complaint/Reason for Admission:  Cervical spondylosis [M47.812]    Patient presents for repeat RFA.  She did well with the previous cervical MBB x2 reporting 95% and 100% after each.  She is here for RFA at the same levels.    PTA Medications   Medication Sig    acetaminophen (TYLENOL) 650 MG TbSR Take 1 tablet (650 mg total) by mouth every 8 (eight) hours as needed.    aliskiren (TEKTURNA) 300 MG Tab TAKE 1 TABLET BY MOUTH EVERY DAY    coenzyme Q10 100 mg capsule Take 100 mg by mouth once daily.    doxazosin (CARDURA) 1 MG tablet TAKE 1 TABLET BY MOUTH EVERY DAY    felodipine (PLENDIL) 2.5 MG Tb24 Take 2 tablets (5 mg total) by mouth once daily.    glucosamine-chondroitin 500-400 mg tablet Take 1 tablet by mouth once daily.     labetaloL (NORMODYNE) 100 MG tablet Take 1.5 tablets (150 mg total) by mouth every 12 (twelve) hours.    multivitamin capsule Take 1 capsule by mouth once daily.    omeprazole (PRILOSEC OTC) 20 MG tablet Take 20 mg by mouth once daily.    pravastatin (PRAVACHOL) 40 MG tablet TAKE 1 TABLET BY MOUTH EVERY DAY    TURMERIC ORAL Take 500 mg by mouth once daily.     vitamin D (VITAMIN D3) 1000 units Tab Take 1,000 Units by mouth once daily.    albuterol (PROVENTIL/VENTOLIN HFA) 90 mcg/actuation inhaler Inhale 2 puffs into the lungs every 6 (six) hours as needed for Wheezing. Rescue    aspirin (ECOTRIN) 81 MG EC tablet Take 1 tablet (81 mg total) by mouth once daily.    benzonatate (TESSALON) 200 MG capsule Take 1 capsule (200 mg total) by mouth 3 (three) times daily as needed for Cough.    diphenoxylate-atropine 2.5-0.025 mg (LOMOTIL) 2.5-0.025 mg per tablet Take 1 tablet by mouth 4 (four) times daily as needed for Diarrhea.    lactase (LACTAID)  3,000 unit tablet Take 1 tablet by mouth 3 (three) times daily as needed.    LIDOcaine (LIDODERM) 5 % Place 1 patch onto the skin once daily. Leave on up to 12 hours and then off 12 hours before applying the next patch    LORazepam (ATIVAN) 0.5 MG tablet TAKE 1/2 TO 1 TABLET BY MOUTH EVERY 12 HOURS AS NEEDED FOR ANXIETY    ondansetron (ZOFRAN-ODT) 4 MG TbDL Take 1 tablet (4 mg total) by mouth every 6 (six) hours as needed (nausea).    polymyxin B sulf-trimethoprim (POLYTRIM) 10,000 unit- 1 mg/mL Drop Place 1 drop into both eyes every 6 (six) hours.    tiZANidine (ZANAFLEX) 4 MG tablet TAKE 1 TABLET EVERY 8 HOURS AS NEEDED (NO DRIVING/OPERATING HEAVY MACHINERY. MAY CAUSE DROWSINESS.).       Review of patient's allergies indicates:   Allergen Reactions    Sulfa (sulfonamide antibiotics) Rash    Clarithromycin Other (See Comments)     Weak, extreme fatigue. dizziness    Flexeril [cyclobenzaprine] Other (See Comments)     Dizziness    Iodine and iodide containing products     Lisinopril Other (See Comments)     Cough and sensation of throat swelling/?angioedema    Losartan Rash    Metoprolol Swelling     Tightness in throat    Tramadol Other (See Comments)     Dizzy and weak    Verapamil (bulk) Palpitations    Voltaren [diclofenac sodium] Other (See Comments)     Drops blood pressure       Past Medical History:   Diagnosis Date    Anxiety     Arthritis     Basal cell carcinoma 09/2016    right post auricular neck     Breast cancer 1992    right    Cataract     Fibromyalgia     History of measles as a child     Hyperlipidemia     Hypertension     Personal history of colonic polyps     Pneumonia     SCC (squamous cell carcinoma) 2015    R chest    SCC (squamous cell carcinoma) 2016    left medial shoulder    SCC (squamous cell carcinoma) 2017    left knee    Shingles     Squamous cell carcinoma 2015    right forearm    Thyroid disease     Vaginitis      Past Surgical History:   Procedure  Laterality Date    ADENOIDECTOMY      BREAST BIOPSY Left     Excisional bx, benign    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    CARPAL TUNNEL RELEASE Right 3/16/2021    Procedure: RELEASE, CARPAL TUNNEL;  Surgeon: Barbara Aldridge MD;  Location: [facility] OR;  Service: Orthopedics;  Laterality: Right;    CATARACT EXTRACTION W/  INTRAOCULAR LENS IMPLANT Bilateral     EYE SURGERY      HAND SURGERY      INJECTION OF ANESTHETIC AGENT AROUND MEDIAL BRANCH NERVES INNERVATING CERVICAL FACET JOINT N/A 1/4/2022    Procedure: Cervical Medial Branch Block C5-6, C6-7 (No Sedation);  Surgeon: Himanshu Blackmon Jr., MD;  Location: [facility]IN [facility]GT;  Service: Pain Management;  Laterality: N/A;    INJECTION OF ANESTHETIC AGENT AROUND MEDIAL BRANCH NERVES INNERVATING LUMBAR FACET JOINT Left 11/18/2021    Procedure: Cervical Medial Branch Block Left C5-6, C6-7 (IV Sedation);  Surgeon: Himanshu Blackmon Jr., MD;  Location: [facility];  Service: Pain Management;  Laterality: Left;    JOINT REPLACEMENT Right     ELZBIETA    KNEE ARTHROPLASTY Left 8/10/2020    Procedure: ARTHROPLASTY, KNEE-ADE NEXGEN/CEMENTED TIBIA;  Surgeon: Kalin Pacheco MD;  Location: [facility] OR;  Service: Orthopedics;  Laterality: Left;    thyriodectomy      partial    tonsillectomy      TONSILLECTOMY      TOTAL HIP ARTHROPLASTY      right    Yag  Left      Family History   Problem Relation Age of Onset    Breast cancer Mother     Cancer Mother     Stroke Paternal Grandfather     Heart disease Father     Cancer Paternal Aunt     Heart disease Paternal Aunt     Glaucoma Paternal Grandmother     Amblyopia Neg Hx     Blindness Neg Hx     Cataracts Neg Hx     Diabetes Neg Hx     Hypertension Neg Hx     Macular degeneration Neg Hx     Retinal detachment Neg Hx     Strabismus Neg Hx     Thyroid disease Neg Hx     Melanoma Neg Hx      Social History     Tobacco Use    Smoking status: Never Smoker    Smokeless tobacco: Never Used   Substance Use  Topics    Alcohol use: Yes     Comment: socially - celebrations only    Drug use: Never        Review of Systems:  Review of Systems   Constitutional: Negative for chills and fever.   HENT: Negative for nosebleeds.    Eyes: Negative for blurred vision.   Respiratory: Negative for hemoptysis.    Cardiovascular: Negative for chest pain and palpitations.   Gastrointestinal: Negative for heartburn and vomiting.   Genitourinary: Negative for hematuria.   Musculoskeletal: Positive for neck pain. Negative for myalgias.   Skin: Negative for rash.   Neurological: Negative for seizures and loss of consciousness.   Endo/Heme/Allergies: Does not bruise/bleed easily.   Psychiatric/Behavioral: Negative for hallucinations.         OBJECTIVE:     Vital Signs (Most Recent):  Temp: 96.8 °F (36 °C) (03/08/22 0935)  Pulse: (!) 54 (03/08/22 0935)  Resp: 16 (03/08/22 0935)  BP: (!) 164/72 (03/08/22 0948)  SpO2: 100 % (03/08/22 0935)    Physical Exam:  General appearance - alert, well appearing, and in no distress  Mental status - AOx3  Eyes - pupils equal and reactive, extraocular eye movements intact  Heart - normal rate, regular rhythm, normal S1, S2, no murmurs, rubs, clicks or gallops  Chest - clear to auscultation, no wheezes, rales or rhonchi, symmetric air entry  Abdomen - soft, nontender, nondistended, no masses or organomegaly  Neurological - alert, oriented, normal speech, no focal findings or movement disorder noted  Extremities - peripheral pulses normal, no pedal edema, no clubbing or cyanosis  Spine - + facet loading in left cervical spine      ASSESSMENT/PLAN:     Cervical Spondylosis  Chronic Neck Pain     The risks and benefits of this intervention, and alternative therapies were discussed with the patient.  The discussion of risks included infection, bleeding, need for additional procedures or surgery, nerve damage, paralysis, adverse medication reaction(s), stroke, and/or death.  Questions regarding the procedure,  risks, expected outcome, and possible side effects were solicited and answered to the patient's satisfaction.  Gerda wishes to proceed with the injection.  Verbal and written consent were verified.      Proceed with intervention as scheduled.      Himanshu Blackmon Jr, MD  Interventional Pain Medicine / Anesthesiology

## 2022-03-10 ENCOUNTER — OFFICE VISIT (OUTPATIENT)
Dept: INTERNAL MEDICINE | Facility: CLINIC | Age: 87
End: 2022-03-10
Payer: MEDICARE

## 2022-03-10 VITALS
HEIGHT: 63 IN | SYSTOLIC BLOOD PRESSURE: 138 MMHG | HEART RATE: 69 BPM | DIASTOLIC BLOOD PRESSURE: 60 MMHG | RESPIRATION RATE: 16 BRPM | TEMPERATURE: 98 F | OXYGEN SATURATION: 98 % | WEIGHT: 141.31 LBS | BODY MASS INDEX: 25.04 KG/M2

## 2022-03-10 DIAGNOSIS — I10 HYPERTENSION, ESSENTIAL: Primary | Chronic | ICD-10-CM

## 2022-03-10 DIAGNOSIS — I70.0 AORTIC ATHEROSCLEROSIS: Chronic | ICD-10-CM

## 2022-03-10 DIAGNOSIS — E78.5 HYPERLIPIDEMIA, UNSPECIFIED HYPERLIPIDEMIA TYPE: Chronic | ICD-10-CM

## 2022-03-10 DIAGNOSIS — N18.31 STAGE 3A CHRONIC KIDNEY DISEASE: Chronic | ICD-10-CM

## 2022-03-10 PROCEDURE — 99213 OFFICE O/P EST LOW 20 MIN: CPT | Mod: PBBFAC,PO | Performed by: INTERNAL MEDICINE

## 2022-03-10 PROCEDURE — 99214 PR OFFICE/OUTPT VISIT, EST, LEVL IV, 30-39 MIN: ICD-10-PCS | Mod: S$PBB,,, | Performed by: INTERNAL MEDICINE

## 2022-03-10 PROCEDURE — 99999 PR PBB SHADOW E&M-EST. PATIENT-LVL III: ICD-10-PCS | Mod: PBBFAC,,, | Performed by: INTERNAL MEDICINE

## 2022-03-10 PROCEDURE — 99999 PR PBB SHADOW E&M-EST. PATIENT-LVL III: CPT | Mod: PBBFAC,,, | Performed by: INTERNAL MEDICINE

## 2022-03-10 PROCEDURE — 99214 OFFICE O/P EST MOD 30 MIN: CPT | Mod: S$PBB,,, | Performed by: INTERNAL MEDICINE

## 2022-03-10 NOTE — PROGRESS NOTES
Subjective:       Patient ID: Gerda Lai is a 89 y.o. female.    Chief Complaint: Follow-up    HPI     89-year-old female here for follow-up.    HTN - Patient is currently on cardura 1 mg, aliskiren 300 mg, plendil 2.5 mg BID, labetalol 150 mg BID. She does check her BP at home, and it runs 89/50, not sure of other readings. Side effects of medications note: none. Denies headaches, blurred vision, chest pain, shortness of breath, nausea.  The felodipine was making her sick.  She takes the felodipine 1st and labetalol 2-3 hours later.  She takes cardura before she goes to sleep.      HLD - Patient is currently on pravastatin 40 mg.  Her last lipid panel was   Cholesterol   Date Value Ref Range Status   01/12/2022 167 120 - 199 mg/dL Final     Comment:     The National Cholesterol Education Program (NCEP) has set the  following guidelines (reference ranges) for Cholesterol:  Optimal.....................<200 mg/dL  Borderline High.............200-239 mg/dL  High........................> or = 240 mg/dL       Triglycerides   Date Value Ref Range Status   01/12/2022 141 30 - 150 mg/dL Final     Comment:     The National Cholesterol Education Program (NCEP) has set the  following guidelines (reference values) for triglycerides:  Normal......................<150 mg/dL  Borderline High.............150-199 mg/dL  High........................200-499 mg/dL       HDL   Date Value Ref Range Status   01/12/2022 54 40 - 75 mg/dL Final     Comment:     The National Cholesterol Education Program (NCEP) has set the  following guidelines (reference values) for HDL Cholesterol:  Low...............<40 mg/dL  Optimal...........>60 mg/dL       LDL Cholesterol   Date Value Ref Range Status   01/12/2022 84.8 63.0 - 159.0 mg/dL Final     Comment:     The National Cholesterol Education Program (NCEP) has set the  following guidelines (reference values) for LDL Cholesterol:  Optimal.......................<130 mg/dL  Borderline  High...............130-159 mg/dL  High..........................160-189 mg/dL  Very High.....................>190 mg/dL     .  Side effects of the medication: none.    Review of Systems      Objective:      Physical Exam  Vitals reviewed.   Constitutional:       Appearance: She is well-developed.   HENT:      Head: Normocephalic and atraumatic.      Mouth/Throat:      Pharynx: No oropharyngeal exudate.   Eyes:      General: No scleral icterus.        Right eye: No discharge.         Left eye: No discharge.      Pupils: Pupils are equal, round, and reactive to light.   Neck:      Thyroid: No thyromegaly.      Trachea: No tracheal deviation.   Cardiovascular:      Rate and Rhythm: Normal rate and regular rhythm.      Heart sounds: Normal heart sounds. No murmur heard.    No friction rub. No gallop.   Pulmonary:      Effort: Pulmonary effort is normal. No respiratory distress.      Breath sounds: Normal breath sounds. No wheezing or rales.   Chest:      Chest wall: No tenderness.   Abdominal:      General: Bowel sounds are normal. There is no distension.      Palpations: Abdomen is soft. There is no mass.      Tenderness: There is no abdominal tenderness. There is no guarding or rebound.   Musculoskeletal:         General: No tenderness. Normal range of motion.      Cervical back: Normal range of motion and neck supple.   Skin:     General: Skin is warm and dry.      Coloration: Skin is not pale.      Findings: No erythema or rash.   Neurological:      Mental Status: She is alert and oriented to person, place, and time.   Psychiatric:         Behavior: Behavior normal.         Assessment:       1. Hypertension, essential    2. Hyperlipidemia, unspecified hyperlipidemia type    3. Aortic atherosclerosis    4. Stage 3a chronic kidney disease      Plan:       1.  Continue cardura 1 mg, aliskiren 300 mg, plendil 2.5 mg BID, labetalol 150 mg BID.  2. Pravastatin 40 mg.  3. Continue pravastatin.  4. Monitor.

## 2022-03-17 ENCOUNTER — OFFICE VISIT (OUTPATIENT)
Dept: ORTHOPEDICS | Facility: CLINIC | Age: 87
End: 2022-03-17
Payer: MEDICARE

## 2022-03-17 DIAGNOSIS — M17.11 PRIMARY OSTEOARTHRITIS OF RIGHT KNEE: Primary | ICD-10-CM

## 2022-03-17 PROCEDURE — 20610 DRAIN/INJ JOINT/BURSA W/O US: CPT | Mod: PBBFAC,RT | Performed by: PHYSICIAN ASSISTANT

## 2022-03-17 PROCEDURE — 99999 PR PBB SHADOW E&M-EST. PATIENT-LVL III: ICD-10-PCS | Mod: PBBFAC,,, | Performed by: PHYSICIAN ASSISTANT

## 2022-03-17 PROCEDURE — 99213 OFFICE O/P EST LOW 20 MIN: CPT | Mod: PBBFAC,25 | Performed by: PHYSICIAN ASSISTANT

## 2022-03-17 PROCEDURE — 20610 PR DRAIN/INJECT LARGE JOINT/BURSA: ICD-10-PCS | Mod: S$PBB,RT,, | Performed by: PHYSICIAN ASSISTANT

## 2022-03-17 PROCEDURE — 99999 PR PBB SHADOW E&M-EST. PATIENT-LVL III: CPT | Mod: PBBFAC,,, | Performed by: PHYSICIAN ASSISTANT

## 2022-03-17 PROCEDURE — 20610 DRAIN/INJ JOINT/BURSA W/O US: CPT | Mod: S$PBB,RT,, | Performed by: PHYSICIAN ASSISTANT

## 2022-03-17 PROCEDURE — 99213 OFFICE O/P EST LOW 20 MIN: CPT | Mod: 25,S$PBB,, | Performed by: PHYSICIAN ASSISTANT

## 2022-03-17 PROCEDURE — 99213 PR OFFICE/OUTPT VISIT, EST, LEVL III, 20-29 MIN: ICD-10-PCS | Mod: 25,S$PBB,, | Performed by: PHYSICIAN ASSISTANT

## 2022-03-17 RX ORDER — METHYLPREDNISOLONE ACETATE 80 MG/ML
80 INJECTION, SUSPENSION INTRA-ARTICULAR; INTRALESIONAL; INTRAMUSCULAR; SOFT TISSUE
Status: COMPLETED | OUTPATIENT
Start: 2022-03-17 | End: 2022-03-17

## 2022-03-17 RX ADMIN — METHYLPREDNISOLONE ACETATE 80 MG: 80 INJECTION, SUSPENSION INTRALESIONAL; INTRAMUSCULAR; INTRASYNOVIAL; SOFT TISSUE at 07:03

## 2022-03-17 NOTE — PROGRESS NOTES
Subjective:      Patient ID: Gerda Lai is a 89 y.o. female.    Chief Complaint: Pain of the Right Knee    HPI  Patient returns with chief complaint of right knee pain. She has h/o DJD. She had CSI in October that gave her pretty good relief for a while. She takes tylenol arthritis. She denies swelling. She uses a cane.   Review of Systems   Constitutional: Negative for chills, fever and night sweats.   Cardiovascular: Negative for chest pain.   Respiratory: Negative for cough and shortness of breath.    Hematologic/Lymphatic: Does not bruise/bleed easily.   Skin: Negative for color change.   Gastrointestinal: Negative for heartburn.   Genitourinary: Negative for dysuria.   Neurological: Negative for numbness and paresthesias.   Psychiatric/Behavioral: Negative for altered mental status.   Allergic/Immunologic: Negative for persistent infections.         Objective:            General    Vitals reviewed.  Constitutional: She is oriented to person, place, and time. She appears well-developed and well-nourished.   Cardiovascular: Normal rate.    Neurological: She is alert and oriented to person, place, and time.             Right Knee Exam:  ROM 0-120 degrees  No effusion  No warmth or erythema  TTP lateral > medial joint line      X-ray: reviewed by myself. Severe DJD right knee.      Assessment:       Encounter Diagnosis   Name Primary?    Primary osteoarthritis of right knee Yes          Plan:       Patient would like to repeat CSI. RTC prn.     PROCEDURE:  I have explained the risks, benefits, and alternatives of the procedure in detail.  The patient voices understanding and all questions have been answered.  The patient agrees to proceed as planned. So after I performed a sterile prep of the skin in the normal fashion the right knee is injected using a 22 gauge needle from the anterolateral approach with a combination of 4cc 1% plain lidocaine and 80 mg of depo medrol.  The patient is cautioned and immediate  relief of pain is secondary to the local anesthetic and will be temporary.  After the anesthetic wears off there may be a increase in pain that may last for a few hours or a few days and they should use ice to help alleviate this flair up of pain.

## 2022-03-28 ENCOUNTER — OFFICE VISIT (OUTPATIENT)
Dept: ORTHOPEDICS | Facility: CLINIC | Age: 87
End: 2022-03-28
Payer: MEDICARE

## 2022-03-28 ENCOUNTER — HOSPITAL ENCOUNTER (OUTPATIENT)
Dept: RADIOLOGY | Facility: HOSPITAL | Age: 87
Discharge: HOME | End: 2022-03-28
Attending: ORTHOPAEDIC SURGERY
Payer: MEDICARE

## 2022-03-28 VITALS — BODY MASS INDEX: 25.04 KG/M2 | WEIGHT: 141.31 LBS | HEIGHT: 63 IN

## 2022-03-28 DIAGNOSIS — M77.41 METATARSALGIA OF RIGHT FOOT: ICD-10-CM

## 2022-03-28 DIAGNOSIS — M20.41 HAMMERTOE OF RIGHT FOOT: ICD-10-CM

## 2022-03-28 DIAGNOSIS — R52 PAIN: ICD-10-CM

## 2022-03-28 DIAGNOSIS — M20.11 HALLUX VALGUS, RIGHT: Primary | ICD-10-CM

## 2022-03-28 DIAGNOSIS — R52 PAIN: Primary | ICD-10-CM

## 2022-03-28 PROCEDURE — 99213 OFFICE O/P EST LOW 20 MIN: CPT | Mod: PBBFAC | Performed by: ORTHOPAEDIC SURGERY

## 2022-03-28 PROCEDURE — 73630 X-RAY EXAM OF FOOT: CPT | Mod: 26,RT,, | Performed by: RADIOLOGY

## 2022-03-28 PROCEDURE — 99999 PR PBB SHADOW E&M-EST. PATIENT-LVL III: ICD-10-PCS | Mod: PBBFAC,,, | Performed by: ORTHOPAEDIC SURGERY

## 2022-03-28 PROCEDURE — 73630 X-RAY EXAM OF FOOT: CPT | Mod: TC,RT

## 2022-03-28 PROCEDURE — 73630 XR FOOT COMPLETE 3 VIEW RIGHT: ICD-10-PCS | Mod: 26,RT,, | Performed by: RADIOLOGY

## 2022-03-28 PROCEDURE — 99213 OFFICE O/P EST LOW 20 MIN: CPT | Mod: S$PBB,,, | Performed by: ORTHOPAEDIC SURGERY

## 2022-03-28 PROCEDURE — 99213 PR OFFICE/OUTPT VISIT, EST, LEVL III, 20-29 MIN: ICD-10-PCS | Mod: S$PBB,,, | Performed by: ORTHOPAEDIC SURGERY

## 2022-03-28 PROCEDURE — 99999 PR PBB SHADOW E&M-EST. PATIENT-LVL III: CPT | Mod: PBBFAC,,, | Performed by: ORTHOPAEDIC SURGERY

## 2022-03-28 NOTE — PROGRESS NOTES
This is a new patient.     CHIEF COMPLAINT: Right foot forefoot pain      SUMMARY OF PRESENTING COMPLAINT: Gerda Lai is a 89 y.o. female who presents for evaluation of right forefoot pain has been bothering her for the last several years.  She has a history of left total knee arthroplasty by Dr. Mayfield.  She reports feelings of possible instability stemming from her advanced right knee osteoarthritis.  She would like to seek clarification on whether her right foot bunion and forefoot pain with hinder her from rehabbing from possible right total knee arthroplasty.  She denies any feelings of right ankle instability with the foot giving out.  She states the pain is located mainly over the MTP joints of her 2nd 3rd and 4th toes.  She has not tried any interventions for this pain.  She ambulates with a cane at baseline.  She would like to avoid surgery if possible but is considering having her right knee replaced.      PAST MEDICAL HISTORY:  Past Medical History:   Diagnosis Date    Anxiety     Arthritis     Basal cell carcinoma 09/2016    right post auricular neck     Breast cancer 1992    right    Cataract     Fibromyalgia     History of measles as a child     Hyperlipidemia     Hypertension     Personal history of colonic polyps     Pneumonia     SCC (squamous cell carcinoma) 2015    R chest    SCC (squamous cell carcinoma) 2016    left medial shoulder    SCC (squamous cell carcinoma) 2017    left knee    Shingles     Squamous cell carcinoma 2015    right forearm    Thyroid disease     Vaginitis           SOCIAL HISTORY:    reports that she has never smoked. She has never used smokeless tobacco. She reports current alcohol use. She reports that she does not use drugs.       MEDICATIONS:     Current Outpatient Medications:     acetaminophen (TYLENOL) 650 MG TbSR, Take 1 tablet (650 mg total) by mouth every 8 (eight) hours as needed., Disp: 120 tablet, Rfl: 0    albuterol (PROVENTIL/VENTOLIN  HFA) 90 mcg/actuation inhaler, Inhale 2 puffs into the lungs every 6 (six) hours as needed for Wheezing. Rescue, Disp: 1 Inhaler, Rfl: 0    aliskiren (TEKTURNA) 300 MG Tab, TAKE 1 TABLET BY MOUTH EVERY DAY, Disp: 90 tablet, Rfl: 3    aspirin (ECOTRIN) 81 MG EC tablet, Take 1 tablet (81 mg total) by mouth once daily., Disp: 90 tablet, Rfl: 3    benzonatate (TESSALON) 200 MG capsule, Take 1 capsule (200 mg total) by mouth 3 (three) times daily as needed for Cough., Disp: 30 capsule, Rfl: 0    coenzyme Q10 100 mg capsule, Take 100 mg by mouth once daily., Disp: , Rfl:     diphenoxylate-atropine 2.5-0.025 mg (LOMOTIL) 2.5-0.025 mg per tablet, Take 1 tablet by mouth 4 (four) times daily as needed for Diarrhea., Disp: 20 tablet, Rfl: 0    doxazosin (CARDURA) 1 MG tablet, TAKE 1 TABLET BY MOUTH EVERY DAY, Disp: 90 tablet, Rfl: 3    felodipine (PLENDIL) 2.5 MG Tb24, Take 2 tablets (5 mg total) by mouth once daily., Disp: 180 tablet, Rfl: 1    glucosamine-chondroitin 500-400 mg tablet, Take 1 tablet by mouth once daily. , Disp: , Rfl:     labetaloL (NORMODYNE) 100 MG tablet, Take 1.5 tablets (150 mg total) by mouth every 12 (twelve) hours., Disp: 270 tablet, Rfl: 1    lactase (LACTAID) 3,000 unit tablet, Take 1 tablet by mouth 3 (three) times daily as needed., Disp: , Rfl:     LIDOcaine (LIDODERM) 5 %, Place 1 patch onto the skin once daily. Leave on up to 12 hours and then off 12 hours before applying the next patch, Disp: 30 patch, Rfl: 0    LORazepam (ATIVAN) 0.5 MG tablet, TAKE 1/2 TO 1 TABLET BY MOUTH EVERY 12 HOURS AS NEEDED FOR ANXIETY, Disp: 30 tablet, Rfl: 0    multivitamin capsule, Take 1 capsule by mouth once daily., Disp: , Rfl:     omeprazole (PRILOSEC OTC) 20 MG tablet, Take 20 mg by mouth once daily., Disp: , Rfl:     ondansetron (ZOFRAN-ODT) 4 MG TbDL, Take 1 tablet (4 mg total) by mouth every 6 (six) hours as needed (nausea)., Disp: 10 tablet, Rfl: 0    polymyxin B sulf-trimethoprim  (POLYTRIM) 10,000 unit- 1 mg/mL Drop, Place 1 drop into both eyes every 6 (six) hours., Disp: 10 mL, Rfl: 0    pravastatin (PRAVACHOL) 40 MG tablet, TAKE 1 TABLET BY MOUTH EVERY DAY, Disp: 90 tablet, Rfl: 3    tiZANidine (ZANAFLEX) 4 MG tablet, TAKE 1 TABLET EVERY 8 HOURS AS NEEDED (NO DRIVING/OPERATING HEAVY MACHINERY. MAY CAUSE DROWSINESS.)., Disp: 50 tablet, Rfl: 0    TURMERIC ORAL, Take 500 mg by mouth once daily. 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    CARPAL TUNNEL RELEASE Right 3/16/2021    Procedure: RELEASE, CARPAL TUNNEL;  Surgeon: Barbara Aldridge MD;  Location: [facility] OR;  Service: Orthopedics;  Laterality: Right;    CATARACT EXTRACTION W/  INTRAOCULAR LENS IMPLANT Bilateral     EYE SURGERY      HAND SURGERY      INJECTION OF ANESTHETIC AGENT AROUND MEDIAL BRANCH NERVES INNERVATING CERVICAL FACET JOINT N/A 1/4/2022    Procedure: Cervical Medial Branch Block C5-6, C6-7 (No Sedation);  Surgeon: Himanshu Blackmon Jr., MD;  Location: [facility]GT;  Service: Pain Management;  Laterality: N/A;    INJECTION OF ANESTHETIC AGENT AROUND MEDIAL BRANCH NERVES INNERVATING LUMBAR FACET JOINT Left 11/18/2021    Procedure: Cervical Medial Branch Block Left C5-6, C6-7 (IV Sedation);  Surgeon: Himanshu Blackmon Jr., MD;  Location: [facility];  Service: Pain Management;  Laterality: Left;    JOINT REPLACEMENT Right     ELZBIETA    KNEE ARTHROPLASTY Left 8/10/2020    Procedure: ARTHROPLASTY, KNEE-ADE NEXGEN/CEMENTED TIBIA;  Surgeon: Kalin Pacheco MD;  Location: [facility] OR;  Service: Orthopedics;  Laterality: Left;    RADIOFREQUENCY THERMAL COAGULATION OF MEDIAL BRANCH OF POSTERIOR RAMUS OF CERVICAL SPINAL NERVE Left 3/8/2022    Procedure: RADIOFREQUENCY  THERMAL COAGULATION, NERVE, SPINAL, CERVICAL, LEFT C5-6 AND C6-7;  Surgeon: Himanshu Blackmon Jr., MD;  Location: [facility];  Service: Pain Management;  Laterality: Left;  NO PACEMAKER   ASA    thyriodectomy      partial    tonsillectomy      TONSILLECTOMY      TOTAL HIP ARTHROPLASTY      right    Yag  Left           ALLERGIES:   Review of patient's allergies indicates:   Allergen Reactions    Sulfa (sulfonamide antibiotics) Rash    Clarithromycin Other (See Comments)     Weak, extreme fatigue. dizziness    Flexeril [cyclobenzaprine] Other (See Comments)     Dizziness    Iodine and iodide containing products     Lisinopril Other (See Comments)     Cough and sensation of throat swelling/?angioedema    Losartan Rash    Metoprolol Swelling     Tightness in throat    Tramadol Other (See Comments)     Dizzy and weak    Verapamil (bulk) Palpitations    Voltaren [diclofenac sodium] Other (See Comments)     Drops blood pressure          REVIEW OF SYSTEMS:  Negative for any chest pain, shortness of breath, fever or weight loss.      PHYSICAL EXAM:    General exam  Well-developed, well-nourished   Height:  5 ft 3 in, Weight:  141  General: No acute distress. No increased work of breathing.   Standing posture:  Right knee valgus deformity (knee, ankle, foot)    RLE  Skin closed, mild erythema surrounding the medial aspect of the 1st metatarsal head, callus involving the medial aspect of the 2nd distal toe  Deformity:  Hallux valgus (valgus, varus, equinus)  Ecchymoses:  None  Swelling:  None  TTP:  None ankle joint line; moderate TTP over the MTP joints of the 2nd through 4th toes   Compartments soft and compressible  ROM:  Full and painless dorsiflexion & plantarflexion; full and painless inversion & eversion   SILT Sa/Douglas/SP/DP  Motor intact EHL/FHL/Gastroc/TA  Brisk cap refill  Warm well perfused extremities  DP pulse 2+ palpable       IMAGING:   right foot x-rays demonstrate advanced midfoot arthritis  with hallux valgus and overriding 2nd hammertoe.      IMPRESSION:   1. Hallux valgus, right     2. Metatarsalgia of right foot     3. Hammertoe of right foot             PLAN:   Sahil      Gerda Lai is a 89 y.o. female who presents for evaluation of her right forefoot pain.  Her pain is largely stemming from advanced MTP joint arthritis as well as possibly contributing from her midfoot arthritis.  She has hallux valgus on the right side but this is not a large contributor to her pain.  She would like to avoid surgery to the right foot if at all possible and her right knee valgus deformity is a larger priority in terms of surgical intervention.  We will plan for physical therapy in order to stretch her heel cord and see her back in 8 weeks.      Drew Gaffney MD  PGY-2  Department of Orthopaedic Surgery  [facility]     I have personally taken the history and examined this patient and agree with the residents note as stated above.

## 2022-04-14 DIAGNOSIS — I10 HYPERTENSION, ESSENTIAL: Chronic | ICD-10-CM

## 2022-04-14 RX ORDER — LORAZEPAM 0.5 MG/1
TABLET ORAL
Qty: 30 TABLET | Refills: 0 | Status: SHIPPED | OUTPATIENT
Start: 2022-04-14 | End: 2022-06-16

## 2022-04-20 ENCOUNTER — OFFICE VISIT (OUTPATIENT)
Dept: INTERNAL MEDICINE | Facility: CLINIC | Age: 87
End: 2022-04-20
Payer: MEDICARE

## 2022-04-20 VITALS
TEMPERATURE: 98 F | SYSTOLIC BLOOD PRESSURE: 118 MMHG | HEART RATE: 68 BPM | WEIGHT: 139.13 LBS | HEIGHT: 63 IN | RESPIRATION RATE: 18 BRPM | BODY MASS INDEX: 24.65 KG/M2 | OXYGEN SATURATION: 93 % | DIASTOLIC BLOOD PRESSURE: 60 MMHG

## 2022-04-20 DIAGNOSIS — I10 HYPERTENSION, ESSENTIAL: Chronic | ICD-10-CM

## 2022-04-20 DIAGNOSIS — R05.9 COUGH: ICD-10-CM

## 2022-04-20 DIAGNOSIS — K59.00 CONSTIPATION, UNSPECIFIED CONSTIPATION TYPE: Primary | ICD-10-CM

## 2022-04-20 PROCEDURE — 99999 PR PBB SHADOW E&M-EST. PATIENT-LVL V: CPT | Mod: PBBFAC,,, | Performed by: INTERNAL MEDICINE

## 2022-04-20 PROCEDURE — 99215 OFFICE O/P EST HI 40 MIN: CPT | Mod: PBBFAC,PO | Performed by: INTERNAL MEDICINE

## 2022-04-20 PROCEDURE — U0003 INFECTIOUS AGENT DETECTION BY NUCLEIC ACID (DNA OR RNA); SEVERE ACUTE RESPIRATORY SYNDROME CORONAVIRUS 2 (SARS-COV-2) (CORONAVIRUS DISEASE [COVID-19]), AMPLIFIED PROBE TECHNIQUE, MAKING USE OF HIGH THROUGHPUT TECHNOLOGIES AS DESCRIBED BY CMS-2020-01-R: HCPCS | Performed by: INTERNAL MEDICINE

## 2022-04-20 PROCEDURE — 99214 OFFICE O/P EST MOD 30 MIN: CPT | Mod: S$PBB,,, | Performed by: INTERNAL MEDICINE

## 2022-04-20 PROCEDURE — 99999 PR PBB SHADOW E&M-EST. PATIENT-LVL V: ICD-10-PCS | Mod: PBBFAC,,, | Performed by: INTERNAL MEDICINE

## 2022-04-20 PROCEDURE — 99214 PR OFFICE/OUTPT VISIT, EST, LEVL IV, 30-39 MIN: ICD-10-PCS | Mod: S$PBB,,, | Performed by: INTERNAL MEDICINE

## 2022-04-20 PROCEDURE — U0005 INFEC AGEN DETEC AMPLI PROBE: HCPCS | Performed by: INTERNAL MEDICINE

## 2022-04-20 NOTE — PROGRESS NOTES
Subjective:       Patient ID: Gerda Lai is a 89 y.o. female.    Chief Complaint: Constipation    HPI     89-year-old female here for evaluation of constipation.  She reports that she does not know when she needs to go to the bathroom - urination and defecation.  A week or so ago, she had all kinds of trouble excreting stuff.  She was upset by this.  She changed some of what she was eating.  She went back to a lot of salad type things.  She is not having accidents.  She reports that she is trying to drink more water.  She tries to drink when she thinks about it.      She reports that her cough started Monday.  She is coughing up mucus.  She reports that this may be coming from her sinuses or throat.  It is better today.  She has no fevers or chills.  She is coughing and sneezing.  She has no SOB.  This has been going on since Saturday (5 days).      She thinks that her BP have been too low and is making her sick.  She was 120s systolic.  She has been feeling badly for several days.  She is on cardura 1 mg, labetalol 150 mg BID.    Review of Systems      Objective:      Physical Exam  Vitals reviewed.   Constitutional:       Appearance: She is well-developed.   HENT:      Head: Normocephalic and atraumatic.      Mouth/Throat:      Pharynx: No oropharyngeal exudate.   Eyes:      General: No scleral icterus.        Right eye: No discharge.         Left eye: No discharge.      Pupils: Pupils are equal, round, and reactive to light.   Neck:      Thyroid: No thyromegaly.      Trachea: No tracheal deviation.   Cardiovascular:      Rate and Rhythm: Normal rate and regular rhythm.      Heart sounds: Normal heart sounds. No murmur heard.    No friction rub. No gallop.   Pulmonary:      Effort: Pulmonary effort is normal. No respiratory distress.      Breath sounds: Normal breath sounds. No wheezing or rales.   Chest:      Chest wall: No tenderness.   Abdominal:      General: Bowel sounds are normal. There is no  distension.      Palpations: Abdomen is soft. There is no mass.      Tenderness: There is no abdominal tenderness. There is no guarding or rebound.   Musculoskeletal:         General: No tenderness. Normal range of motion.      Cervical back: Normal range of motion and neck supple.   Skin:     General: Skin is warm and dry.      Coloration: Skin is not pale.      Findings: No erythema or rash.   Neurological:      Mental Status: She is alert and oriented to person, place, and time.   Psychiatric:         Behavior: Behavior normal.         Assessment:       1. Constipation, unspecified constipation type    2. Cough  - COVID-19 Routine Screening    3. Hypertension, essential      Plan:       1.  Increase water intake, and improved diet.  2. Check COVID swab.  If negative, call back on Saturday if worse or Tuesday if no better for antibiotic.  Would prescribe a Z-Tan.  3. Continue Cardura 1 mg, labetalol 150 mg.  Explained to patient extensively that patient started feeling badly when she came down with this respiratory illness, and is not the blood pressure medication making her feel bad.  Should continue taking her blood pressure medication as prescribed.

## 2022-04-21 ENCOUNTER — TELEPHONE (OUTPATIENT)
Dept: INTERNAL MEDICINE | Facility: CLINIC | Age: 87
End: 2022-04-21
Payer: MEDICARE

## 2022-04-21 DIAGNOSIS — U07.1 COVID-19 VIRUS DETECTED: ICD-10-CM

## 2022-04-21 LAB
SARS-COV-2 RNA RESP QL NAA+PROBE: DETECTED
SARS-COV-2- CYCLE NUMBER: 14

## 2022-04-21 RX ORDER — AMOXICILLIN AND CLAVULANATE POTASSIUM 875; 125 MG/1; MG/1
1 TABLET, FILM COATED ORAL 2 TIMES DAILY
Qty: 14 TABLET | Refills: 0 | Status: SHIPPED | OUTPATIENT
Start: 2022-04-21 | End: 2022-04-28

## 2022-04-21 NOTE — TELEPHONE ENCOUNTER
Pt is requesting a call back from the nurse because she got her covid test results online and it was positive, so she just want to know what does she have to do and if something can be sent to the pharmacy

## 2022-04-21 NOTE — TELEPHONE ENCOUNTER
----- Message from Lata Espinoza sent at 4/21/2022  9:51 AM CDT -----  Contact: Gerda montez [phone]  Patient would like to get medical advice.  Symptoms (please be specific):    How long have you had these symptoms:   Would you like a call back, or a response through your [facility] portal?:call back  Pharmacy name and phone # (copy from chart):    Comments:  Pt is requesting a call back from the nurse because she got her covid test results online and it was positive, so she just want to know what does she have to do and if something can be sent to the pharmacy

## 2022-04-21 NOTE — TELEPHONE ENCOUNTER
Recommend quarantine for 5 days since she is vaccinated and I will send an antibiotic for her symptoms

## 2022-04-27 ENCOUNTER — NURSE TRIAGE (OUTPATIENT)
Dept: ADMINISTRATIVE | Facility: CLINIC | Age: 87
End: 2022-04-27
Payer: MEDICARE

## 2022-04-27 NOTE — TELEPHONE ENCOUNTER
Patient was sent not prescribed Paxlovid, she had no idea what this med was. States she was given antibiotics(augmentin) only. Reports dizziness from time to time but otherwise not having any other difficulty. Please advise.

## 2022-04-27 NOTE — TELEPHONE ENCOUNTER
Pt contacted for [facility] escalation - C/O dizziness and cough up blood tinged sputum once today. Pt is Covid+ as of 4/20/22. Denies any fever or SOB, pt able to speak in full sentences without noted SOB or cough. Covid 19 and dizziness protocol used and pt advised on home care and follow up call from provider. Pt instructed to call [facility] for worsening of symptoms or health questions.    Reason for Disposition   [1] COVID-19 diagnosed by doctor (or NP/PA) AND [2] mild symptoms (e.g., cough, fever, others) AND [3] no complications or SOB   Taking a medicine that could cause dizziness (e.g., blood pressure medications, diuretics)    Additional Information   Negative: SEVERE difficulty breathing (e.g., struggling for each breath, speaks in single words)   Negative: Difficult to awaken or acting confused (e.g., disoriented, slurred speech)   Negative: Bluish (or gray) lips or face now   Negative: Shock suspected (e.g., cold/pale/clammy skin, too weak to stand, low BP, rapid pulse)   Negative: Sounds like a life-threatening emergency to the triager   Negative: [1] Diagnosed with COVID-19 AND [2] symptoms lasting 3 or more weeks   Negative: [1] COVID-19 exposure AND [2] no symptoms   Negative: COVID-19 vaccine reaction suspected (e.g., fever, headache, muscle aches) occurring 1 to 3 days after getting vaccine   Negative: COVID-19 vaccine, questions about   Negative: [1] Lives with someone known to have influenza (flu test positive) AND [2] flu-like symptoms (e.g., cough, runny nose, sore throat, SOB; with or without fever)   Negative: [1] Adult with possible COVID-19 symptoms AND [2] triager concerned about severity of symptoms or other causes   Negative: COVID-19 and breastfeeding, questions about   Negative: SEVERE or constant chest pain or pressure (Exception: mild central chest pain, present only when coughing)   Negative: MODERATE difficulty breathing (e.g., speaks in phrases, SOB even at rest, pulse  100-120)   Negative: Headache and stiff neck (can't touch chin to chest)   Negative: Chest pain or pressure   Negative: Patient sounds very sick or weak to the triager   Negative: MILD difficulty breathing (e.g., minimal/no SOB at rest, SOB with walking, pulse <100)   Negative: [1] Fever > 100.0 F (37.8 C) AND [2] bedridden (e.g., nursing home patient, CVA, chronic illness, recovering from surgery)   Negative: [1] Fever > 101 F (38.3 C) AND [2] over 60 years of age   Negative: Fever > 103 F (39.4 C)   Negative: HIGH RISK for severe COVID complications (e.g., age > 64 years, obesity with BMI > 25, pregnant, chronic lung disease or other chronic medical condition) (Exception: Already seen by PCP and no new or worsening symptoms.)   Negative: [1] HIGH RISK patient AND [2] influenza exposure within the last 7 days AND [3] ONE OR MORE respiratory symptoms: cough, sore throat, runny or stuffy nose   Negative: [1] HIGH RISK patient AND [2] influenza is widespread in the community AND [3] ONE OR MORE respiratory symptoms: cough, sore throat, runny or stuffy nose   Negative: [1] COVID-19 infection suspected by caller or triager AND [2] mild symptoms (cough, fever, or others) AND [3] negative COVID-19 rapid test   Negative: [1] COVID-19 infection suspected by caller or triager AND [2] mild symptoms (cough, fever, or others) AND [3] has not gotten tested yet   Negative: Fever present > 3 days (72 hours)   Negative: [1] Fever returns after gone for over 24 hours AND [2] symptoms worse or not improved   Negative: [1] Continuous (nonstop) coughing interferes with work or school AND [2] no improvement using cough treatment per Care Advice   Negative: Cough present > 3 weeks   Negative: [1] COVID-19 diagnosed by positive lab test (e.g., PCR, rapid self-test kit) AND [2] NO symptoms (e.g., cough, fever, others)   Negative: [1] COVID-19 diagnosed by positive lab test (e.g., PCR, rapid self-test kit) AND [2] mild  symptoms (e.g., cough, fever, others) AND [3] no complications or SOB   Negative: Shock suspected (e.g., cold/pale/clammy skin, too weak to stand)   Negative: Difficult to awaken or acting confused (e.g., disoriented, slurred speech)   Negative: Fainted, and still feels dizzy afterwards   Negative: SEVERE difficulty breathing (e.g., struggling for each breath, speaks in single words)   Negative: Overdose (accidental or intentional) of medications   Negative: New neurologic deficit that is present now: * Weakness of the face, arm, or leg on one side of the body * Numbness of the face, arm, or leg on one side of the body * Loss of speech or garbled speech   Negative: Heart beating < 50 beats per minute OR > 140 beats per minute   Negative: Sounds like a life-threatening emergency to the triager   Negative: Chest pain   Negative: Rectal bleeding, bloody stool, or tarry-black stool   Negative: Vomiting is the main symptom   Negative: Diarrhea is the main symptom   Negative: Headache is the main symptom   Negative: SEVERE dizziness (e.g., unable to stand, requires support to walk, feels like passing out now)   Negative: Severe headache   Negative: Extra heart beats OR irregular heart beating (i.e., 'palpitations')   Negative: Difficulty breathing   Negative: Drinking very little and has signs of dehydration (e.g., no urine > 12 hours, very dry mouth, very lightheaded)   Negative: Follows bleeding (e.g., stomach, rectum, vagina) (Exception: became dizzy from sight of small amount blood)   Negative: Patient sounds very sick or weak to the triager   Negative: Lightheadedness (dizziness) present now, after 2 hours of rest and fluids   Negative: Spinning or tilting sensation (vertigo) present now   Negative: Fever > 103 F (39.4 C)   Negative: Fever > 100.5 F (38.1 C) and has diabetes mellitus or a weak immune system (e.g., HIV positive, cancer chemotherapy, organ transplant, splenectomy, chronic  steroids)   Negative: Vomiting occurs with dizziness   Negative: Patient wants to be seen    Protocols used: CORONAVIRUS (COVID-19) DIAGNOSED OR [id number], ST DIZZINESS-A-OH

## 2022-04-27 NOTE — TELEPHONE ENCOUNTER
Please find out if patient has taken the paxlovid (completed or still taking it).  This can cause dizziness.  If patient is having trouble standing/moving, advise ER.

## 2022-04-28 NOTE — TELEPHONE ENCOUNTER
Returned call to patient, vm left with Dr Beltran's notes. Instructed to call to schedule video visit with Dr Beltran today or tomorrow to evaluate symptoms or to be evaluated in urgent care.

## 2022-04-28 NOTE — TELEPHONE ENCOUNTER
She was outside the window for paxlovid.  Unfortunately, there is not much to be done for her symptoms without evaluating her.  If this is all driven by COVID, it will take time to resolve.  We can schedule her a virtual appointment to discuss her symptoms.

## 2022-05-05 ENCOUNTER — TELEPHONE (OUTPATIENT)
Dept: INTERNAL MEDICINE | Facility: CLINIC | Age: 87
End: 2022-05-05
Payer: MEDICARE

## 2022-05-05 NOTE — TELEPHONE ENCOUNTER
----- Message from Mireya Quintana sent at 5/5/2022 12:49 PM CDT -----  Contact: self [phone]  Patient would like to let Dr. Beltran know that she tested negative on Monday for Covid.

## 2022-05-19 ENCOUNTER — OFFICE VISIT (OUTPATIENT)
Dept: URGENT CARE | Facility: CLINIC | Age: 87
End: 2022-05-19
Payer: MEDICARE

## 2022-05-19 VITALS
HEIGHT: 63 IN | TEMPERATURE: 98 F | OXYGEN SATURATION: 96 % | SYSTOLIC BLOOD PRESSURE: 143 MMHG | RESPIRATION RATE: 20 BRPM | WEIGHT: 139 LBS | BODY MASS INDEX: 24.63 KG/M2 | DIASTOLIC BLOOD PRESSURE: 58 MMHG | HEART RATE: 58 BPM

## 2022-05-19 DIAGNOSIS — S81.812A SKIN TEAR OF LEFT LOWER LEG WITHOUT COMPLICATION, INITIAL ENCOUNTER: Primary | ICD-10-CM

## 2022-05-19 PROCEDURE — 99213 PR OFFICE/OUTPT VISIT, EST, LEVL III, 20-29 MIN: ICD-10-PCS | Mod: S$GLB,,, | Performed by: STUDENT IN AN ORGANIZED HEALTH CARE EDUCATION/TRAINING PROGRAM

## 2022-05-19 PROCEDURE — 99213 OFFICE O/P EST LOW 20 MIN: CPT | Mod: S$GLB,,, | Performed by: STUDENT IN AN ORGANIZED HEALTH CARE EDUCATION/TRAINING PROGRAM

## 2022-05-19 NOTE — PROGRESS NOTES
"Subjective:       Patient ID: Gerda Lai is a 89 y.o. female.    Vitals:  height is 5' 3" (1.6 m) and weight is 63 kg (139 lb). Her temperature is 98.2 °F (36.8 °C). Her blood pressure is 143/58 (abnormal) and her pulse is 58 (abnormal). Her respiration is 20 and oxygen saturation is 96%.     Chief Complaint: Skin Avulsion    89 y/o female states she hit her leg on side of the night stand at home. Skin avulsion to lower left leg. TDAP vaccine last given 08/04/2015. Injury happened 05/18/2022. States the bleeding never stopped since impact.    Other  This is a new problem. The current episode started yesterday. The problem has been gradually worsening.     ROS    Objective:      Physical Exam   Constitutional: She is oriented to person, place, and time. She does not appear ill. No distress.   HENT:   Head: Normocephalic.   Pulmonary/Chest: No respiratory distress.   Neurological: She is alert and oriented to person, place, and time. Coordination normal.   Skin: Skin is warm and dry.         Comments: Laceration left anterior lower leg. V shaped flapped. Retracted skin vs partially missing. Approx 1.5 cm each edge   Psychiatric: Her behavior is normal.   Nursing note and vitals reviewed.        Assessment:       1. Skin tear of left lower leg without complication, initial encounter          Plan:         Skin tear of left lower leg without complication, initial encounter  -     Cancel: (In Office Administered) Td Vaccine    89 yo female presents with day old skin tear and mild bleeding. Cleansed with betadine and irrigated with normal saline. Attempted closure with steri strips. Antibiotic ointment applied at open wound site and sterile dressing applied. Wound care discussed. F/u with PCP for wound check in the next 1-2 weeks if possible. Return to urgent care for signs of infection as discussed.                "

## 2022-05-19 NOTE — PATIENT INSTRUCTIONS
Apply Bacitracin or other triple antibiotic ointment to wound prior to covering with bandaid. This will help keep the wound moist and prevent infection. Ok to discontinue ointment once scab formation starts.

## 2022-05-23 ENCOUNTER — OFFICE VISIT (OUTPATIENT)
Dept: DERMATOLOGY | Facility: CLINIC | Age: 87
End: 2022-05-23
Payer: MEDICARE

## 2022-05-23 DIAGNOSIS — D48.5 NEOPLASM OF UNCERTAIN BEHAVIOR OF SKIN: Primary | ICD-10-CM

## 2022-05-23 PROCEDURE — 11102 TANGNTL BX SKIN SINGLE LES: CPT | Mod: PBBFAC,PO | Performed by: DERMATOLOGY

## 2022-05-23 PROCEDURE — 11102 PR TANGENTIAL BIOPSY, SKIN, SINGLE LESION: ICD-10-PCS | Mod: S$PBB,,, | Performed by: DERMATOLOGY

## 2022-05-23 PROCEDURE — 88305 TISSUE EXAM BY PATHOLOGIST: ICD-10-PCS | Mod: 26,,, | Performed by: DERMATOLOGY

## 2022-05-23 PROCEDURE — 99999 PR PBB SHADOW E&M-EST. PATIENT-LVL III: ICD-10-PCS | Mod: PBBFAC,,, | Performed by: DERMATOLOGY

## 2022-05-23 PROCEDURE — 99499 UNLISTED E&M SERVICE: CPT | Mod: S$PBB,,, | Performed by: DERMATOLOGY

## 2022-05-23 PROCEDURE — 88305 TISSUE EXAM BY PATHOLOGIST: CPT | Performed by: DERMATOLOGY

## 2022-05-23 PROCEDURE — 99213 OFFICE O/P EST LOW 20 MIN: CPT | Mod: PBBFAC,PO | Performed by: DERMATOLOGY

## 2022-05-23 PROCEDURE — 88305 TISSUE EXAM BY PATHOLOGIST: CPT | Mod: 26,,, | Performed by: DERMATOLOGY

## 2022-05-23 PROCEDURE — 99499 NO LOS: ICD-10-PCS | Mod: S$PBB,,, | Performed by: DERMATOLOGY

## 2022-05-23 PROCEDURE — 11102 TANGNTL BX SKIN SINGLE LES: CPT | Mod: S$PBB,,, | Performed by: DERMATOLOGY

## 2022-05-23 PROCEDURE — 99999 PR PBB SHADOW E&M-EST. PATIENT-LVL III: CPT | Mod: PBBFAC,,, | Performed by: DERMATOLOGY

## 2022-05-23 NOTE — PROGRESS NOTES
Subjective:       Patient ID:  Gerda Lai is a 89 y.o. female who presents for   Chief Complaint   Patient presents with    Lesion     CHEST     Patient with new complaint of lesion(s)  Location: chest  Duration: 3-4wks  Symptoms: growing  Relieving factors/Previous treatments: A&D oint., aquaphor      Lesion        Review of Systems   Skin: Positive for activity-related sunscreen use. Negative for daily sunscreen use and tendency to form keloidal scars.   Hematologic/Lymphatic: Bruises/bleeds easily.        Objective:    Physical Exam   Constitutional: She appears well-developed and well-nourished. No distress.   Neurological: She is alert and oriented to person, place, and time. She is not disoriented.   Psychiatric: She has a normal mood and affect.   Skin:   Areas Examined (abnormalities noted in diagram):   Chest / Axilla Inspection Performed                  Diagram Legend     Erythematous scaling macule/papule c/w actinic keratosis       Vascular papule c/w angioma      Pigmented verrucoid papule/plaque c/w seborrheic keratosis      Yellow umbilicated papule c/w sebaceous hyperplasia      Irregularly shaped tan macule c/w lentigo     1-2 mm smooth white papules consistent with Milia      Movable subcutaneous cyst with punctum c/w epidermal inclusion cyst      Subcutaneous movable cyst c/w pilar cyst      Firm pink to brown papule c/w dermatofibroma      Pedunculated fleshy papule(s) c/w skin tag(s)      Evenly pigmented macule c/w junctional nevus     Mildly variegated pigmented, slightly irregular-bordered macule c/w mildly atypical nevus      Flesh colored to evenly pigmented papule c/w intradermal nevus       Pink pearly papule/plaque c/w basal cell carcinoma      Erythematous hyperkeratotic cursted plaque c/w SCC      Surgical scar with no sign of skin cancer recurrence      Open and closed comedones      Inflammatory papules and pustules      Verrucoid papule consistent consistent with wart      Erythematous eczematous patches and plaques     Dystrophic onycholytic nail with subungual debris c/w onychomycosis     Umbilicated papule    Erythematous-base heme-crusted tan verrucoid plaque consistent with inflamed seborrheic keratosis     Erythematous Silvery Scaling Plaque c/w Psoriasis     See annotation      Assessment / Plan:      Pathology Orders:     Normal Orders This Visit    Specimen to Pathology, Dermatology     Comments:    Number of Specimens:->1  ------------------------->-------------------------  Spec 1 Procedure:->Biopsy  Spec 1 Clinical Impression:->r/o SCC- KA type  Spec 1 Source:->left upper chest    Questions:    Procedure Type: Dermatology and skin neoplasms    Number of Specimens: 1    ------------------------: -------------------------    Spec 1 Procedure: Biopsy    Spec 1 Clinical Impression: r/o SCC- KA type    Spec 1 Source: left upper chest    Release to patient:         Neoplasm of uncertain behavior of skin  Shave biopsy procedure note:    Shave biopsy performed after verbal consent including risk of infection, scar, recurrence, need for additional treatment of site. Area prepped with alcohol, anesthetized with approximately 1.0cc of 1% lidocaine with epinephrine. Lesional tissue shaved with razor blade. Hemostasis achieved with application of aluminum chloride followed by hyfrecation. No complications. Dressing applied. Wound care explained.    If biopsy positive for malignancy, will treat with Aldara 5% cream qhs x 4 weeks.   -     Specimen to Pathology, Dermatology             Follow up for prn bx report.

## 2022-05-23 NOTE — PATIENT INSTRUCTIONS
Shave Biopsy Wound Care    Your doctor has performed a shave biopsy today.  A band aid and vaseline ointment has been placed over the site.  This should remain in place for NO LONGER THAN 48 hours.  It is fine to remove the bandaid after 24 hours, if the area is no longer bleeding. It is recommended that you keep the area dry (do not wet)) for the first 24 hours.  After 24 hours, wash the area with warm soap and water and apply Vaseline jelly.  Many patients prefer to use Neosporin or Bacitracin ointment.  This is acceptable; however, know that you can develop an allergy to this medication even if you have used it safely for years.  It is important to keep the area moist.  Letting it dry out and get air slows healing time, and will worsen the scar.        If you notice increasing redness, tenderness, pain, or yellow drainage at the biopsy site, please notify your doctor.  These are signs of an infection.    If your biopsy site is bleeding, apply firm pressure for 15 minutes straight.  Repeat for another 15 minutes, if it is still bleeding.   If the surgical site continues to bleed, then please contact your doctor.      For [facility] users:   You will receive your biopsy results in [facility] as soon as they are available. Please be assured that your physician/provider will review your results and will then determine what further treatment, evaluation, or planning is required. You should be contacted by your physician's/provider's office within 5 business days of receiving your results; If not, please reach out to directly. This is one more way [employer] is putting you first.     [facility] [place] [address]/ [phone] FAX/ [email or web address]      Shave Biopsy Wound Care    Your doctor has performed a shave biopsy today.  A band aid and vaseline ointment has been placed over the site.  This should remain in place for NO LONGER THAN 48 hours.  It is fine to remove the bandaid after 24  hours, if the area is no longer bleeding. It is recommended that you keep the area dry (do not wet)) for the first 24 hours.  After 24 hours, wash the area with warm soap and water and apply Vaseline jelly.  Many patients prefer to use Neosporin or Bacitracin ointment.  This is acceptable; however, know that you can develop an allergy to this medication even if you have used it safely for years.  It is important to keep the area moist.  Letting it dry out and get air slows healing time, and will worsen the scar.        If you notice increasing redness, tenderness, pain, or yellow drainage at the biopsy site, please notify your doctor.  These are signs of an infection.    If your biopsy site is bleeding, apply firm pressure for 15 minutes straight.  Repeat for another 15 minutes, if it is still bleeding.   If the surgical site continues to bleed, then please contact your doctor.      For [facility] users:   You will receive your biopsy results in [facility] as soon as they are available. Please be assured that your physician/provider will review your results and will then determine what further treatment, evaluation, or planning is required. You should be contacted by your physician's/provider's office within 5 business days of receiving your results; If not, please reach out to directly. This is one more way [facility] is putting you first.     [facility] [facility] [place] [address]/ [phone] FAX/ [email or web address]

## 2022-05-31 ENCOUNTER — PATIENT MESSAGE (OUTPATIENT)
Dept: ADMINISTRATIVE | Facility: OTHER | Age: 87
End: 2022-05-31
Payer: MEDICARE

## 2022-05-31 ENCOUNTER — TELEPHONE (OUTPATIENT)
Dept: OTOLARYNGOLOGY | Facility: CLINIC | Age: 87
End: 2022-05-31
Payer: MEDICARE

## 2022-05-31 LAB
FINAL PATHOLOGIC DIAGNOSIS: NORMAL
GROSS: NORMAL
Lab: NORMAL
MICROSCOPIC EXAM: NORMAL

## 2022-05-31 NOTE — TELEPHONE ENCOUNTER
----- Message from Jose Ko sent at 5/31/2022  4:09 PM CDT -----  Contact: Patient  Patient requesting a call back in regards to scheduling an follow up visit.       [phone] ([place])

## 2022-06-06 NOTE — PROGRESS NOTES
Please call patient to schedule a Mohs consultation.     Skin, left upper chest, shave biopsy:   -SQUAMOUS CELL CARCINOMA, INVASIVE, KERATOACANTHOMA LIKE, EXTENDING TO THE   BASE OF THE SPECIMEN

## 2022-06-08 ENCOUNTER — TELEPHONE (OUTPATIENT)
Dept: DERMATOLOGY | Facility: CLINIC | Age: 87
End: 2022-06-08
Payer: MEDICARE

## 2022-06-08 NOTE — TELEPHONE ENCOUNTER
----- Message from Sonya Santoyo sent at 6/8/2022  8:14 AM CDT -----  Gerda Lai calling regarding a miss call from Dr. Benoit's Office.  She believe it was [facility]  She was referred by Dr. Curtis.  call back [phone]

## 2022-06-13 ENCOUNTER — PATIENT MESSAGE (OUTPATIENT)
Dept: ORTHOPEDICS | Facility: CLINIC | Age: 87
End: 2022-06-13
Payer: MEDICARE

## 2022-06-13 DIAGNOSIS — M77.41 METATARSALGIA OF RIGHT FOOT: Primary | ICD-10-CM

## 2022-06-13 DIAGNOSIS — M20.41 HAMMERTOE OF RIGHT FOOT: ICD-10-CM

## 2022-06-13 DIAGNOSIS — M67.00 ACQUIRED SHORT ACHILLES TENDON, UNSPECIFIED LATERALITY: ICD-10-CM

## 2022-06-13 DIAGNOSIS — M20.11 HALLUX VALGUS, RIGHT: ICD-10-CM

## 2022-06-14 ENCOUNTER — HOSPITAL ENCOUNTER (OUTPATIENT)
Dept: CARDIOLOGY | Facility: HOSPITAL | Age: 87
Discharge: HOME | End: 2022-06-14
Attending: INTERNAL MEDICINE
Payer: MEDICARE

## 2022-06-14 DIAGNOSIS — I77.1 SUBCLAVIAN ARTERY STENOSIS, RIGHT: ICD-10-CM

## 2022-06-14 DIAGNOSIS — I65.23 BILATERAL CAROTID ARTERY STENOSIS: ICD-10-CM

## 2022-06-14 LAB
LEFT ARM DIASTOLIC BLOOD PRESSURE: 80 MMHG
LEFT ARM SYSTOLIC BLOOD PRESSURE: 130 MMHG
LEFT CBA DIAS: 46 CM/S
LEFT CBA SYS: 273 CM/S
LEFT CCA DIST DIAS: 9 CM/S
LEFT CCA DIST SYS: 72 CM/S
LEFT CCA MID DIAS: 6 CM/S
LEFT CCA MID SYS: 81 CM/S
LEFT CCA PROX DIAS: 7 CM/S
LEFT CCA PROX SYS: 90 CM/S
LEFT ECA DIAS: 12 CM/S
LEFT ECA SYS: 183 CM/S
LEFT ICA DIST DIAS: 31 CM/S
LEFT ICA DIST SYS: 156 CM/S
LEFT ICA MID DIAS: 26 CM/S
LEFT ICA MID SYS: 221 CM/S
LEFT ICA PROX DIAS: 34 CM/S
LEFT ICA PROX SYS: 296 CM/S
LEFT VERTEBRAL DIAS: 7 CM/S
LEFT VERTEBRAL SYS: 42 CM/S
OHS CV CAROTID RIGHT ICA EDV HIGHEST: 27
OHS CV CAROTID ULTRASOUND LEFT ICA/CCA RATIO: 4.11
OHS CV CAROTID ULTRASOUND RIGHT ICA/CCA RATIO: 4.25
OHS CV PV CAROTID LEFT HIGHEST CCA: 90
OHS CV PV CAROTID LEFT HIGHEST ICA: 296
OHS CV PV CAROTID RIGHT HIGHEST CCA: 77
OHS CV PV CAROTID RIGHT HIGHEST ICA: 306
OHS CV US CAROTID LEFT HIGHEST EDV: 34
RIGHT CBA DIAS: 30 CM/S
RIGHT CBA SYS: 261 CM/S
RIGHT CCA DIST DIAS: 8 CM/S
RIGHT CCA DIST SYS: 72 CM/S
RIGHT CCA MID DIAS: 9 CM/S
RIGHT CCA MID SYS: 77 CM/S
RIGHT CCA PROX DIAS: 9 CM/S
RIGHT CCA PROX SYS: 61 CM/S
RIGHT ECA DIAS: 23 CM/S
RIGHT ECA SYS: 367 CM/S
RIGHT ICA DIST DIAS: 12 CM/S
RIGHT ICA DIST SYS: 126 CM/S
RIGHT ICA MID DIAS: 19 CM/S
RIGHT ICA MID SYS: 165 CM/S
RIGHT ICA PROX DIAS: 27 CM/S
RIGHT ICA PROX SYS: 306 CM/S
RIGHT VERTEBRAL DIAS: 16 CM/S
RIGHT VERTEBRAL SYS: 119 CM/S

## 2022-06-14 PROCEDURE — 93880 EXTRACRANIAL BILAT STUDY: CPT | Mod: 26,,, | Performed by: INTERNAL MEDICINE

## 2022-06-14 PROCEDURE — 93880 EXTRACRANIAL BILAT STUDY: CPT

## 2022-06-14 PROCEDURE — 93880 CV US DOPPLER CAROTID (CUPID ONLY): ICD-10-PCS | Mod: 26,,, | Performed by: INTERNAL MEDICINE

## 2022-06-21 ENCOUNTER — OFFICE VISIT (OUTPATIENT)
Dept: CARDIOLOGY | Facility: CLINIC | Age: 87
End: 2022-06-21
Payer: MEDICARE

## 2022-06-21 VITALS
DIASTOLIC BLOOD PRESSURE: 53 MMHG | BODY MASS INDEX: 24.57 KG/M2 | WEIGHT: 138.69 LBS | SYSTOLIC BLOOD PRESSURE: 107 MMHG | HEART RATE: 58 BPM | HEIGHT: 63 IN

## 2022-06-21 DIAGNOSIS — I70.0 AORTIC ATHEROSCLEROSIS: Chronic | ICD-10-CM

## 2022-06-21 DIAGNOSIS — R00.1 SINUS BRADYCARDIA: ICD-10-CM

## 2022-06-21 DIAGNOSIS — E78.5 HYPERLIPIDEMIA, UNSPECIFIED HYPERLIPIDEMIA TYPE: Chronic | ICD-10-CM

## 2022-06-21 DIAGNOSIS — I65.23 BILATERAL CAROTID ARTERY STENOSIS: Primary | ICD-10-CM

## 2022-06-21 DIAGNOSIS — I10 HYPERTENSION, ESSENTIAL: Chronic | ICD-10-CM

## 2022-06-21 DIAGNOSIS — N18.31 STAGE 3A CHRONIC KIDNEY DISEASE: Chronic | ICD-10-CM

## 2022-06-21 PROCEDURE — 93010 ELECTROCARDIOGRAM REPORT: CPT | Mod: S$PBB,,, | Performed by: INTERNAL MEDICINE

## 2022-06-21 PROCEDURE — 99999 PR PBB SHADOW E&M-EST. PATIENT-LVL III: ICD-10-PCS | Mod: PBBFAC,,, | Performed by: NURSE PRACTITIONER

## 2022-06-21 PROCEDURE — 99214 PR OFFICE/OUTPT VISIT, EST, LEVL IV, 30-39 MIN: ICD-10-PCS | Mod: S$PBB,,, | Performed by: NURSE PRACTITIONER

## 2022-06-21 PROCEDURE — 93005 ELECTROCARDIOGRAM TRACING: CPT | Mod: PBBFAC,PO | Performed by: INTERNAL MEDICINE

## 2022-06-21 PROCEDURE — 99999 PR PBB SHADOW E&M-EST. PATIENT-LVL III: CPT | Mod: PBBFAC,,, | Performed by: NURSE PRACTITIONER

## 2022-06-21 PROCEDURE — 93010 EKG 12-LEAD: ICD-10-PCS | Mod: S$PBB,,, | Performed by: INTERNAL MEDICINE

## 2022-06-21 PROCEDURE — 99213 OFFICE O/P EST LOW 20 MIN: CPT | Mod: PBBFAC,PO | Performed by: NURSE PRACTITIONER

## 2022-06-21 PROCEDURE — 99214 OFFICE O/P EST MOD 30 MIN: CPT | Mod: S$PBB,,, | Performed by: NURSE PRACTITIONER

## 2022-06-21 RX ORDER — PRAVASTATIN SODIUM 80 MG/1
40 TABLET ORAL DAILY
Qty: 90 TABLET | Refills: 3 | Status: SHIPPED | OUTPATIENT
Start: 2022-06-21 | End: 2022-06-21

## 2022-06-21 RX ORDER — PRAVASTATIN SODIUM 80 MG/1
80 TABLET ORAL DAILY
Qty: 90 TABLET | Refills: 3 | Status: SHIPPED | OUTPATIENT
Start: 2022-06-21 | End: 2023-08-25 | Stop reason: SDUPTHER

## 2022-06-21 NOTE — PROGRESS NOTES
"Ms. Lai is a patient of Dr. Camarena and was last seen in [facility] Cardiology Visit 6/11/21      Subjective:   Patient ID:  Gerda Lai is a 89 y.o. female who presents for follow-up of Hypertension    Problem List:  Hypertension  - intolerance to multiple antihypertensive meds  - wide auscultatory gap  Right subclavian artery stenosis  Bilateral Carotid artery disease, 70-79%  Breast cancer    HPI:   Gerda Lai is in clinic today with her niece for routine follow up.  Reports having lightheadedness after taking her blood pressure medications.  She has moved some of her medication dosing times.  Reports feeling drained after taking her medications and having to take naps.  "I think that I'm over medicated."  States that this has been going on for around a year or so and she is tired of feeling this way.     Review of Systems   Constitutional: Negative for decreased appetite, diaphoresis, malaise/fatigue, weight gain and weight loss.   Eyes: Negative for visual disturbance.   Cardiovascular: Negative for chest pain, claudication, dyspnea on exertion, irregular heartbeat, leg swelling, near-syncope, orthopnea, palpitations, paroxysmal nocturnal dyspnea and syncope.        Denies chest pressure   Respiratory: Negative for cough, hemoptysis, shortness of breath, sleep disturbances due to breathing and snoring.    Endocrine: Negative for cold intolerance and heat intolerance.   Hematologic/Lymphatic: Negative for bleeding problem. Does not bruise/bleed easily.   Musculoskeletal: Negative for myalgias.   Gastrointestinal: Negative for bloating, abdominal pain, anorexia, change in bowel habit, constipation, diarrhea, nausea and vomiting.   Neurological: Positive for light-headedness. Negative for difficulty with concentration, disturbances in coordination, excessive daytime sleepiness, dizziness, headaches, loss of balance, numbness and weakness.   Psychiatric/Behavioral: The patient does not have insomnia.  "       Allergies and current medications updated and reviewed:  Review of patient's allergies indicates:   Allergen Reactions    Sulfa (sulfonamide antibiotics) Rash    Clarithromycin Other (See Comments)     Weak, extreme fatigue. dizziness    Flexeril [cyclobenzaprine] Other (See Comments)     Dizziness    Iodine and iodide containing products     Lisinopril Other (See Comments)     Cough and sensation of throat swelling/?angioedema    Losartan Rash    Metoprolol Swelling     Tightness in throat    Tramadol Other (See Comments)     Dizzy and weak    Verapamil (bulk) Palpitations    Voltaren [diclofenac sodium] Other (See Comments)     Drops blood pressure     Current Outpatient Medications   Medication Sig    acetaminophen (TYLENOL) 650 MG TbSR Take 1 tablet (650 mg total) by mouth every 8 (eight) hours as needed.    albuterol (PROVENTIL/VENTOLIN HFA) 90 mcg/actuation inhaler Inhale 2 puffs into the lungs every 6 (six) hours as needed for Wheezing. Rescue    aliskiren (TEKTURNA) 300 MG Tab TAKE 1 TABLET BY MOUTH EVERY DAY    aspirin (ECOTRIN) 81 MG EC tablet TAKE 1 TABLET BY MOUTH EVERY DAY    coenzyme Q10 100 mg capsule Take 100 mg by mouth once daily.    doxazosin (CARDURA) 1 MG tablet TAKE 1 TABLET BY MOUTH EVERY DAY    felodipine (PLENDIL) 2.5 MG Tb24 TAKE 2 TABLETS (5 MG TOTAL) BY MOUTH ONCE DAILY.    glucosamine-chondroitin 500-400 mg tablet Take 1 tablet by mouth once daily.     labetaloL (NORMODYNE) 100 MG tablet TAKE 1.5 TABLETS (150 MG TOTAL) BY MOUTH EVERY 12 (TWELVE) HOURS.    lactase (LACTAID) 3,000 unit tablet Take 1 tablet by mouth 3 (three) times daily as needed.    LORazepam (ATIVAN) 0.5 MG tablet TAKE 1/2 TO 1 TABLET BY MOUTH EVERY 12 HOURS AS NEEDED FOR ANXIETY    multivitamin capsule Take 1 capsule by mouth once daily.    omeprazole (PRILOSEC OTC) 20 MG tablet Take 20 mg by mouth once daily.    polymyxin B sulf-trimethoprim (POLYTRIM) 10,000 unit- 1 mg/mL Drop Place 1  "drop into both eyes every 6 (six) hours.    pravastatin (PRAVACHOL) 40 MG tablet TAKE 1 TABLET BY MOUTH EVERY DAY    TURMERIC ORAL Take 500 mg by mouth once daily.     vitamin D (VITAMIN D3) 1000 units Tab Take 1,000 Units by mouth once daily.     No current facility-administered medications for this visit.       Objective:        BP (!) 107/53   Pulse (!) 58   Ht 5' 3" (1.6 m)   Wt 62.9 kg (138 lb 10.7 oz)   BMI 24.56 kg/m²       Physical Exam  Vitals and nursing note reviewed.   Constitutional:       General: She is not in acute distress.     Appearance: Normal appearance. She is well-developed. She is not diaphoretic.   HENT:      Head: Normocephalic and atraumatic.   Eyes:      General: Lids are normal. No scleral icterus.     Conjunctiva/sclera: Conjunctivae normal.   Neck:      Vascular: Normal carotid pulses. No carotid bruit, hepatojugular reflux or JVD.   Cardiovascular:      Rate and Rhythm: Regular rhythm. Bradycardia present.      Chest Wall: PMI is not displaced.      Pulses: Intact distal pulses.           Carotid pulses are 2+ on the right side and 2+ on the left side.       Radial pulses are 2+ on the right side and 2+ on the left side.        Dorsalis pedis pulses are 2+ on the right side and 2+ on the left side.        Posterior tibial pulses are 1+ on the right side and 1+ on the left side.      Heart sounds: S1 normal and S2 normal. No murmur heard.    No friction rub. No gallop.   Pulmonary:      Effort: Pulmonary effort is normal. No respiratory distress.      Breath sounds: Normal breath sounds. No decreased breath sounds, wheezing, rhonchi or rales.   Chest:      Chest wall: No tenderness.   Abdominal:      General: Bowel sounds are normal. There is no distension or abdominal bruit.      Palpations: Abdomen is soft. There is no fluid wave or pulsatile mass.      Tenderness: There is no abdominal tenderness.   Musculoskeletal:      Cervical back: Neck supple.   Skin:     General: Skin " is warm and dry.      Findings: No rash.      Nails: There is no clubbing.   Neurological:      Mental Status: She is alert and oriented to person, place, and time.      Gait: Gait normal.   Psychiatric:         Speech: Speech normal.         Behavior: Behavior normal.         Thought Content: Thought content normal.         Judgment: Judgment normal.         Chemistry        Component Value Date/Time     06/14/2022 1039    K 4.3 06/14/2022 1039     06/14/2022 1039    CO2 22 (L) 06/14/2022 1039    BUN 30 (H) 06/14/2022 1039    CREATININE 1.1 06/14/2022 1039    GLU 93 06/14/2022 1039        Component Value Date/Time    CALCIUM 9.9 06/14/2022 1039    ALKPHOS 65 06/14/2022 1039    AST 19 06/14/2022 1039    ALT 15 06/14/2022 1039    BILITOT 1.0 06/14/2022 1039    ESTGFRAFRICA 51.4 (A) 06/14/2022 1039    EGFRNONAA 44.6 (A) 06/14/2022 1039        Lab Results   Component Value Date    HGBA1C 5.5 05/27/2011     Recent Labs   Lab 12/30/19  1620 01/09/20  1010 01/12/22  1152 06/14/22  1039   WBC 12.37   < > 7.13  --    Hemoglobin 10.7 L   < > 11.5 L  --    Hematocrit 33.0 L   < > 35.9 L  --     H   < > 101 H  --    Platelets 230   < > 258  --     H  --   --   --    TSH  --    < > 1.821  --    Cholesterol  --    < > 167 173   HDL  --    < > 54 52   LDL Cholesterol  --    < > 84.8 91.0   Triglycerides  --    < > 141 150   HDL/Cholesterol Ratio  --    < > 32.3 30.1    < > = values in this interval not displayed.     Lab Results   Component Value Date    [id number] 745 01/21/2020       Recent Labs   Lab 08/05/20  1122   INR 1.0        Test(s) Reviewed  I have reviewed the following in detail:  [] Stress test   [] Angiography   [x] Echocardiogram   [x] Labs   [] Other:         Assessment/Plan:   1. Bilateral carotid artery stenosis  Significant bilateral disease. D/w Dr. Carrington and would medically manage disease.     2. Aortic atherosclerosis      3. Hyperlipidemia, unspecified hyperlipidemia type  LDL not  at goal <70. Increase pravastatin to 80 mg daily.  Encouraged mediterranean diet and exercise.     - IN OFFICE EKG 12-LEAD (to Muse); Future    4. Hypertension, essential  Having some intermittent hypotension and lightheadedness.  Stop the doxazosin.  Discussed decreasing felodipine to once daily if the hypotension and lightheadedness persist.      - IN OFFICE EKG 12-LEAD (to Muse); Future    5. Stage 3a chronic kidney disease      6. Sinus bradycardia  ECG today SB with 1st degree AVB. No significant changes.     - EKG 12-lead       A copy of this note will be forwarded to Dr. Beltran, Dr. Camarena, and Monika Hudson PharmD    Follow up in about 3 months (around 9/21/2022).

## 2022-06-21 NOTE — PATIENT INSTRUCTIONS
You have been on the prilosec a long time.  Check with Dr. Beltran about whether you should have a b12 checked.     Stop the doxazosin    I wonder if your blood pressure cuff is accurate so please bring your cuff to your visit in August with Dr. Beltran and have his nurse check your cuff.      If you are still having lightheadedness, then I would drop the evening felodipine and continue just the morning dose.      Increase the pravastatin to 80 mg.  You can take 2 of the 40 mg tablets daily until you run out and then start the new prescription.

## 2022-06-22 ENCOUNTER — TELEPHONE (OUTPATIENT)
Dept: INTERNAL MEDICINE | Facility: CLINIC | Age: 87
End: 2022-06-22
Payer: MEDICARE

## 2022-06-22 NOTE — TELEPHONE ENCOUNTER
----- Message from Herminio Tamayo sent at 6/22/2022  4:35 PM CDT -----  Contact: [phone]  Pt is returning call, she said she was told she needed to get another booster and she would like to talk to the Dr about something else, please return call, thanks

## 2022-06-22 NOTE — TELEPHONE ENCOUNTER
----- Message from Myah Mccarthy sent at 6/22/2022  1:38 PM CDT -----  Regarding: pt advice  Contact: pt @ [phone]  Pt returning missed call from Dr. Beltran's office regarding a vaccine.please call.

## 2022-06-24 ENCOUNTER — PATIENT MESSAGE (OUTPATIENT)
Dept: DERMATOLOGY | Facility: CLINIC | Age: 87
End: 2022-06-24
Payer: MEDICARE

## 2022-06-27 ENCOUNTER — CLINICAL SUPPORT (OUTPATIENT)
Dept: AUDIOLOGY | Facility: CLINIC | Age: 87
End: 2022-06-27
Payer: MEDICARE

## 2022-06-27 ENCOUNTER — OFFICE VISIT (OUTPATIENT)
Dept: OTOLARYNGOLOGY | Facility: CLINIC | Age: 87
End: 2022-06-27
Payer: MEDICARE

## 2022-06-27 VITALS
BODY MASS INDEX: 24.56 KG/M2 | WEIGHT: 138.69 LBS | SYSTOLIC BLOOD PRESSURE: 114 MMHG | DIASTOLIC BLOOD PRESSURE: 61 MMHG | HEART RATE: 58 BPM

## 2022-06-27 DIAGNOSIS — Z97.4 WEARS HEARING AID IN BOTH EARS: ICD-10-CM

## 2022-06-27 DIAGNOSIS — H91.90 PERCEIVED HEARING LOSS: Primary | ICD-10-CM

## 2022-06-27 DIAGNOSIS — H90.3 SENSORINEURAL HEARING LOSS, BILATERAL: Primary | ICD-10-CM

## 2022-06-27 DIAGNOSIS — H91.90 PERCEIVED HEARING LOSS: ICD-10-CM

## 2022-06-27 DIAGNOSIS — Z86.69 HISTORY OF HEARING LOSS: ICD-10-CM

## 2022-06-27 PROCEDURE — 92557 COMPREHENSIVE HEARING TEST: CPT | Mod: PBBFAC | Performed by: AUDIOLOGIST

## 2022-06-27 PROCEDURE — 69210 REMOVE IMPACTED EAR WAX UNI: CPT | Mod: 50,PBBFAC | Performed by: OTOLARYNGOLOGY

## 2022-06-27 PROCEDURE — 99212 PR OFFICE/OUTPT VISIT, EST, LEVL II, 10-19 MIN: ICD-10-PCS | Mod: 25,S$PBB,, | Performed by: OTOLARYNGOLOGY

## 2022-06-27 PROCEDURE — 99214 OFFICE O/P EST MOD 30 MIN: CPT | Mod: PBBFAC | Performed by: OTOLARYNGOLOGY

## 2022-06-27 PROCEDURE — 99999 PR PBB SHADOW E&M-EST. PATIENT-LVL IV: ICD-10-PCS | Mod: PBBFAC,,, | Performed by: OTOLARYNGOLOGY

## 2022-06-27 PROCEDURE — 99999 PR PBB SHADOW E&M-EST. PATIENT-LVL IV: CPT | Mod: PBBFAC,,, | Performed by: OTOLARYNGOLOGY

## 2022-06-27 PROCEDURE — 92567 TYMPANOMETRY: CPT | Mod: PBBFAC | Performed by: AUDIOLOGIST

## 2022-06-27 PROCEDURE — 99212 OFFICE O/P EST SF 10 MIN: CPT | Mod: 25,S$PBB,, | Performed by: OTOLARYNGOLOGY

## 2022-06-27 NOTE — PROGRESS NOTES
CC:  HPI:Ms. Lai is an 89 year old CF who is wearing Audibel ITC hearing aids ( $6K) presently.   She is contemplating obtaining newer devices at this point.  She is here for audiometry and ear cleaning procedures.    She last visited me in July 2021 expressing dissatisfaction with the  functioning of her  ITC hearing aids ( Audibel) at that time as well as with the performance of her newer larger hearing aids ( obtained on [facility] fit for her by a Phd)  she had most recently been recently fit for.    She now believes that the Audibel devices work better for her up to this point.  She has to take her hearing aid out to communicate by telephone.  She is adherent to a low-sodium diet for blood pressure maintenance purposes.  She has completed serial audiograms here almost yearly since 2013.       Past Medical History:   Diagnosis Date    Anxiety     Arthritis     Basal cell carcinoma 09/2016    right post auricular neck     Breast cancer 1992    right    Cataract     Fibromyalgia     History of measles as a child     Hyperlipidemia     Hypertension     Personal history of colonic polyps     Pneumonia     SCC (squamous cell carcinoma) 2015    R chest    SCC (squamous cell carcinoma) 2016    left medial shoulder    SCC (squamous cell carcinoma) 2017    left knee    Shingles     Squamous cell carcinoma 2015    right forearm    Thyroid disease     Vaginitis          PE:Gen.: alert and oriented bespectacled CF in no acute distress  Blood pressure 114/61 pulse 58  Both ears were examined under the microscope in the micro procedure room.  Several small/tiny pieces of dry cerumen are removed from the right ear canal floor with suction.  Large piece of cerumen is removed from the posterior wall of the left ear canal near the TM.  Smaller pieces of dry cerumen were also suction from the left ear canal.  Both TMs are clear and intact and both middle ear spaces appear well aerated.    Ms. Lai  completed an audiometric study today performed by the [facility] audiology service.  The study is duplicated below and the results were reviewed with her in detail.  The results are compared to those of her previous study.        DIAGNOSIS:     ICD-10-CM ICD-9-CM    1. Perceived hearing loss  H90.5 389.8    2. History of hearing loss  Z86.69 V12.49    3. Wears hearing aid in both ears  Z97.4 [id number]      PLAN: Some cerumen/debris removed from both eacs; AS CI > AD CI  Audiometry reviewed; results compared to previous; slight change in bilateral hearing documented ( louis. 250 Hz)    Pt. escorted to ANDRA Swanson's office for HAC scheduling re: new hearing aid(s) if interested ( Phonak Wilcox?/other )   RTC yearly for ear cleaning

## 2022-06-27 NOTE — PATIENT INSTRUCTIONS
Some cerumen/debris removed from both eacs; AS CI > AD CI  Audiometry reviewed; results compared to previous; slight change in bilateral hearing documented ( louis. 250 Hz)    Pt. escorted to ANDRA Swanson's office for HAC scheduling re: new hearing aid(s) if interested ( Phonak Afton?/other )   RTC yearly for ear cleaning

## 2022-06-27 NOTE — PROGRESS NOTES
Gerda Lai, a 89 y.o. female, was seen today in the clinic for an audiologic evaluation.  Patients main complaint was hearing loss.  Ms. Lai reported that she is feels that her hearing has worsened.  She has a history of bilateral sensorineural hearing loss (SNHL) and currently wears hearing aids purchased at [facility].      Tympanometry revealed Type As in the right ear and Type As in the left ear. Audiogram results revealed mild sloping to severe sensorineural hearing loss (SNHL) in the right ear and mild sloping to severe SNHL in the left ear.  Speech reception thresholds were noted at 40 dB in the right ear and 40 dB in the left ear.  Speech discrimination scores were 84% in the right ear and 84% in the left ear.    Recommendations:  1. Otologic evaluation  2. Annual audiogram  3. Hearing protection when in noise  4. Hearing aid follow up

## 2022-06-28 ENCOUNTER — OFFICE VISIT (OUTPATIENT)
Dept: DERMATOLOGY | Facility: CLINIC | Age: 87
End: 2022-06-28
Payer: MEDICARE

## 2022-06-28 DIAGNOSIS — D48.5 NEOPLASM OF UNCERTAIN BEHAVIOR OF SKIN: Primary | ICD-10-CM

## 2022-06-28 PROCEDURE — 99999 PR PBB SHADOW E&M-EST. PATIENT-LVL III: ICD-10-PCS | Mod: PBBFAC,,, | Performed by: DERMATOLOGY

## 2022-06-28 PROCEDURE — 11102 PR TANGENTIAL BIOPSY, SKIN, SINGLE LESION: ICD-10-PCS | Mod: S$PBB,,, | Performed by: DERMATOLOGY

## 2022-06-28 PROCEDURE — 99499 UNLISTED E&M SERVICE: CPT | Mod: S$PBB,,, | Performed by: DERMATOLOGY

## 2022-06-28 PROCEDURE — 99999 PR PBB SHADOW E&M-EST. PATIENT-LVL III: CPT | Mod: PBBFAC,,, | Performed by: DERMATOLOGY

## 2022-06-28 PROCEDURE — 11103 TANGNTL BX SKIN EA SEP/ADDL: CPT | Mod: PBBFAC,PO | Performed by: DERMATOLOGY

## 2022-06-28 PROCEDURE — 11103 TANGNTL BX SKIN EA SEP/ADDL: CPT | Mod: S$PBB,,, | Performed by: DERMATOLOGY

## 2022-06-28 PROCEDURE — 11103 PR TANGENTIAL BIOPSY, SKIN, EA ADDTL LESION: ICD-10-PCS | Mod: S$PBB,,, | Performed by: DERMATOLOGY

## 2022-06-28 PROCEDURE — 11102 TANGNTL BX SKIN SINGLE LES: CPT | Mod: PBBFAC,PO | Performed by: DERMATOLOGY

## 2022-06-28 PROCEDURE — 88305 TISSUE EXAM BY PATHOLOGIST: ICD-10-PCS | Mod: 26,,, | Performed by: PATHOLOGY

## 2022-06-28 PROCEDURE — 99499 NO LOS: ICD-10-PCS | Mod: S$PBB,,, | Performed by: DERMATOLOGY

## 2022-06-28 PROCEDURE — 11102 TANGNTL BX SKIN SINGLE LES: CPT | Mod: S$PBB,,, | Performed by: DERMATOLOGY

## 2022-06-28 PROCEDURE — 88305 TISSUE EXAM BY PATHOLOGIST: CPT | Mod: 26,,, | Performed by: PATHOLOGY

## 2022-06-28 PROCEDURE — 88305 TISSUE EXAM BY PATHOLOGIST: CPT | Mod: 59 | Performed by: PATHOLOGY

## 2022-06-28 PROCEDURE — 99213 OFFICE O/P EST LOW 20 MIN: CPT | Mod: PBBFAC,PO | Performed by: DERMATOLOGY

## 2022-06-28 NOTE — PROGRESS NOTES
Subjective:       Patient ID:  Gerda Lai is a 89 y.o. female who presents for   Chief Complaint   Patient presents with    Wound Check     Pt here for recheck of biopsy site from 5/23/2022; pt feels area is discolored and sore. Was bleeding. But today it is improved. Lesion has grown larger since last biopsy    Pt also has new lesion on right upper arm. Not healing. No tx.       Review of Systems   Skin: Negative for tendency to form keloidal scars.   Hematologic/Lymphatic: Bruises/bleeds easily.        Objective:    Physical Exam   Constitutional: She appears well-developed and well-nourished. No distress.   Neurological: She is alert and oriented to person, place, and time. She is not disoriented.   Psychiatric: She has a normal mood and affect.   Skin:   Areas Examined (abnormalities noted in diagram):   Chest / Axilla Inspection Performed                      Diagram Legend     Erythematous scaling macule/papule c/w actinic keratosis       Vascular papule c/w angioma      Pigmented verrucoid papule/plaque c/w seborrheic keratosis      Yellow umbilicated papule c/w sebaceous hyperplasia      Irregularly shaped tan macule c/w lentigo     1-2 mm smooth white papules consistent with Milia      Movable subcutaneous cyst with punctum c/w epidermal inclusion cyst      Subcutaneous movable cyst c/w pilar cyst      Firm pink to brown papule c/w dermatofibroma      Pedunculated fleshy papule(s) c/w skin tag(s)      Evenly pigmented macule c/w junctional nevus     Mildly variegated pigmented, slightly irregular-bordered macule c/w mildly atypical nevus      Flesh colored to evenly pigmented papule c/w intradermal nevus       Pink pearly papule/plaque c/w basal cell carcinoma      Erythematous hyperkeratotic cursted plaque c/w SCC      Surgical scar with no sign of skin cancer recurrence      Open and closed comedones      Inflammatory papules and pustules      Verrucoid papule consistent consistent with wart      Erythematous eczematous patches and plaques     Dystrophic onycholytic nail with subungual debris c/w onychomycosis     Umbilicated papule    Erythematous-base heme-crusted tan verrucoid plaque consistent with inflamed seborrheic keratosis     Erythematous Silvery Scaling Plaque c/w Psoriasis     See annotation      Assessment / Plan:      Pathology Orders:     Normal Orders This Visit    Specimen to Pathology, Dermatology     Comments:    Number of Specimens:->2  ------------------------->-------------------------  Spec 1 Procedure:->Biopsy  Spec 1 Clinical Impression:->r/o KA, repeat bx  Spec 1 Source:->left upper chest  ------------------------->-------------------------  Spec 2 Procedure:->Biopsy  Spec 2 Clinical Impression:->r/o SCC  Spec 2 Source:->right upper arm    Questions:    Procedure Type: Dermatology and skin neoplasms    Number of Specimens: 2    ------------------------: -------------------------    Spec 1 Procedure: Biopsy    Spec 1 Clinical Impression: r/o KA, repeat bx    Spec 1 Source: left upper chest    ------------------------: -------------------------    Spec 2 Procedure: Biopsy    Spec 2 Clinical Impression: r/o SCC    Spec 2 Source: right upper arm    Release to patient:         Neoplasm of uncertain behavior of skin x 2   Shave biopsy procedure note:    Shave biopsy performed after verbal consent including risk of infection, scar, recurrence, need for additional treatment of site. Area prepped with alcohol, anesthetized with approximately 1.0cc of 1% lidocaine with epinephrine. Lesional tissue shaved with razor blade. Hemostasis achieved with application of aluminum chloride followed by hyfrecation. No complications. Dressing applied. Wound care explained.    If biopsy positive for malignancy, refer to Dr. Barrios  for Mohs surgery consultation  -     Specimen to Pathology, Dermatology         Follow up for prn bx report.

## 2022-06-29 ENCOUNTER — PROCEDURE VISIT (OUTPATIENT)
Dept: DERMATOLOGY | Facility: CLINIC | Age: 87
End: 2022-06-29
Payer: MEDICARE

## 2022-06-29 VITALS — SYSTOLIC BLOOD PRESSURE: 187 MMHG | HEART RATE: 59 BPM | DIASTOLIC BLOOD PRESSURE: 70 MMHG

## 2022-06-29 DIAGNOSIS — C44.529 SQUAMOUS CELL CARCINOMA OF SKIN OF CHEST: Primary | ICD-10-CM

## 2022-06-29 PROCEDURE — 17314: ICD-10-PCS | Mod: S$PBB,,, | Performed by: DERMATOLOGY

## 2022-06-29 PROCEDURE — 13101 CMPLX RPR TRUNK 2.6-7.5 CM: CPT | Mod: S$PBB,51,, | Performed by: DERMATOLOGY

## 2022-06-29 PROCEDURE — 17313 MOHS 1 STAGE T/A/L: CPT | Mod: S$PBB,,, | Performed by: DERMATOLOGY

## 2022-06-29 PROCEDURE — 13102 CMPLX RPR TRUNK ADDL 5CM/<: CPT | Mod: S$PBB,,, | Performed by: DERMATOLOGY

## 2022-06-29 PROCEDURE — 13102 CMPLX RPR TRUNK ADDL 5CM/<: CPT | Mod: PBBFAC | Performed by: DERMATOLOGY

## 2022-06-29 PROCEDURE — 17314 MOHS ADDL STAGE T/A/L: CPT | Mod: S$PBB,,, | Performed by: DERMATOLOGY

## 2022-06-29 PROCEDURE — 17314 MOHS ADDL STAGE T/A/L: CPT | Mod: PBBFAC | Performed by: DERMATOLOGY

## 2022-06-29 PROCEDURE — 13102 PR REP,SKIN,TRUNK,CMPLX,+5 CM/<: ICD-10-PCS | Mod: S$PBB,,, | Performed by: DERMATOLOGY

## 2022-06-29 PROCEDURE — 17313 MOHS 1 STAGE T/A/L: CPT | Mod: PBBFAC | Performed by: DERMATOLOGY

## 2022-06-29 PROCEDURE — 99499 NO LOS: ICD-10-PCS | Mod: S$PBB,,, | Performed by: DERMATOLOGY

## 2022-06-29 PROCEDURE — 17313: ICD-10-PCS | Mod: S$PBB,,, | Performed by: DERMATOLOGY

## 2022-06-29 PROCEDURE — 13101 PR RECMPL WND TRUNK 2.6-7.5 CM: ICD-10-PCS | Mod: S$PBB,51,, | Performed by: DERMATOLOGY

## 2022-06-29 PROCEDURE — 13101 CMPLX RPR TRUNK 2.6-7.5 CM: CPT | Mod: PBBFAC | Performed by: DERMATOLOGY

## 2022-06-29 PROCEDURE — 99499 UNLISTED E&M SERVICE: CPT | Mod: S$PBB,,, | Performed by: DERMATOLOGY

## 2022-06-29 NOTE — PROGRESS NOTES
PROCEDURE: Mohs' Micrographic Surgery    INDICATION: Tumors with aggressive clinical behavior (rapidly growing, greater than 1 cm in diameter). Tumor size >2 cm on the trunk or extremities.    REFERRING PROVIDER: Bouchra Curtis M.D.    CASE NUMBER:     ANESTHETIC: 9 cc 0.5% Lidocaine with Epi 1:200,000 mixed 1:1 with 0.5% Bupivacaine    SURGICAL PREP: Hibiclens    SURGEON: Chantale Benoit MD    ASSISTANTS: Nahed Siegel PA-C and Bridget Small MA    PREOPERATIVE DIAGNOSIS: squamous cell carcinoma- invasive, keratoacanthoma-like    POSTOPERATIVE DIAGNOSIS: squamous cell carcinoma; SCCIS    PATHOLOGIC DIAGNOSIS: squamous cell carcinoma- invasive, keratoacanthoma-like; SCCIS    HISTOLOGY OF SPECIMENS IN FIRST STAGE:   Tumor Type: Tumor seen. Squamous cell carcinoma in situ: Epidermis with full-thickness atypia and variable epidermal maturation.   Depth of Invasion: epidermis  Perineural Invasion: No    HISTOLOGY OF SPECIMENS IN SUBSEQUENT STAGES:  · Tumor Type: No tumor seen. Actinic keratosis: Keratinocyte atypia along the basal layer.    STAGES OF MOHS' SURGERY PERFORMED: 2    TUMOR-FREE PLANE ACHIEVED: Yes    HEMOSTASIS: electrocoagulation     SPECIMENS: 6 (4 in stage A and 2 in stage B)    LOCATION: left upper chest. Location verified with Dr. Curtis's clinical photograph. Patient also verified location with hand held mirror.    INITIAL LESION SIZE: 1.9 x 2.2 cm    FINAL DEFECT SIZE: 3.5 x 3.6 cm    WOUND REPAIR/DISPOSITION: The patient tolerated Mohs' Micrographic Surgery for a squamous cell carcinoma very well. When the tumor was completely removed, a repair of the surgical defect was undertaken.       PROCEDURE: Complex Linear Repair    INDICATION: Status post Mohs' Micrographic Surgery for squamous cell carcinoma.    CASE NUMBER:     SURGEON: Chantale Benoit MD    ASSISTANTS: Nahed Siegel PA-C and Ángela Hoffman, Surg Tech    ANESTHETIC: 3 cc 1% Lidocaine with Epinephrine 1:100,000    SURGICAL PREP:  Brianna, prepped by Ángela Hoffman Surg Tech    LOCATION: left upper chest    DEFECT SIZE: 3.5 x 3.6 cm    WOUND REPAIR/DISPOSITION:  After the patient's carcinoma had been completely removed with Mohs' Micrographic Surgery, a repair of the surgical defect was undertaken. The patient was returned to the operating suite where the area of left upper chest was prepped, draped, and anesthetized in the usual sterile fashion. The wound was widely undermined in all directions. The wound was undermined to a distance at least the maximum width of the defect as measured perpendicular to the closure line along at least one entire edge of the defect, in this case 3 cm. Then, electrocoagulation was used to obtain meticulous hemostasis. 3-0 Vicryl buried vertical mattress sutures were placed into the subcutaneous and dermal plane to close the wound and marisel the cutaneous wound edge. Bilateral dog ears were identified and were removed by a standard Burow's triangle technique. The cutaneous wound edges were closed using interrupted 3-0 Prolene suture.    The patient tolerated the procedure well.    The area was cleaned and dressed appropriately and the patient was given wound care instructions, as well as appointment for follow-up evaluation and suture removal in 14 days.    LENGTH OF REPAIR: 8.3 cm    Vitals:    06/29/22 1617 06/29/22 1618   BP: (!) 143/64 (!) 187/70   BP Location: Left arm Left arm   Patient Position: Sitting Lying   BP Method: Small (Automatic) Small (Automatic)   Pulse: 63 (!) 59

## 2022-07-06 ENCOUNTER — PATIENT MESSAGE (OUTPATIENT)
Dept: DERMATOLOGY | Facility: CLINIC | Age: 87
End: 2022-07-06
Payer: MEDICARE

## 2022-07-06 LAB
FINAL PATHOLOGIC DIAGNOSIS: NORMAL
GROSS: NORMAL
Lab: NORMAL
MICROSCOPIC EXAM: NORMAL

## 2022-07-11 ENCOUNTER — CLINICAL SUPPORT (OUTPATIENT)
Dept: REHABILITATION | Facility: HOSPITAL | Age: 87
End: 2022-07-11
Attending: ORTHOPAEDIC SURGERY
Payer: MEDICARE

## 2022-07-11 ENCOUNTER — PATIENT MESSAGE (OUTPATIENT)
Dept: DERMATOLOGY | Facility: CLINIC | Age: 87
End: 2022-07-11
Payer: MEDICARE

## 2022-07-11 ENCOUNTER — TELEPHONE (OUTPATIENT)
Dept: DERMATOLOGY | Facility: CLINIC | Age: 87
End: 2022-07-11
Payer: MEDICARE

## 2022-07-11 DIAGNOSIS — G89.29 CHRONIC PAIN OF RIGHT KNEE: ICD-10-CM

## 2022-07-11 DIAGNOSIS — M25.661 DECREASED RANGE OF MOTION OF RIGHT LOWER EXTREMITY: ICD-10-CM

## 2022-07-11 DIAGNOSIS — M67.00 ACQUIRED SHORT ACHILLES TENDON, UNSPECIFIED LATERALITY: ICD-10-CM

## 2022-07-11 DIAGNOSIS — M79.671 RIGHT FOOT PAIN: ICD-10-CM

## 2022-07-11 DIAGNOSIS — M25.561 CHRONIC PAIN OF RIGHT KNEE: ICD-10-CM

## 2022-07-11 DIAGNOSIS — M77.41 METATARSALGIA OF RIGHT FOOT: ICD-10-CM

## 2022-07-11 PROCEDURE — 97110 THERAPEUTIC EXERCISES: CPT | Mod: PO

## 2022-07-11 PROCEDURE — 97140 MANUAL THERAPY 1/> REGIONS: CPT | Mod: PO

## 2022-07-11 PROCEDURE — 97161 PT EVAL LOW COMPLEX 20 MIN: CPT | Mod: PO

## 2022-07-11 NOTE — TELEPHONE ENCOUNTER
----- Message from Irwin Gonsales sent at 7/11/2022 12:29 PM CDT -----  Contact: @[phone]  Pt is calling in to see if when she come in for her appt that is on 7/13 if the doctor would be able to look at her arm also so that she wouldn't have to make another appt with Dr Curtis. Please call to discuss further.

## 2022-07-11 NOTE — TELEPHONE ENCOUNTER
I informed pt that when she comes in this Wednesday to remove sutures from her chest she can show Dr. Benoit her arm and figure out treatment plan then. Most likely will not be able to do Mohs that day but can possibly schedule for the future. Pt expressed verbal understanding.

## 2022-07-12 PROBLEM — M25.661 DECREASED RANGE OF MOTION OF RIGHT LOWER EXTREMITY: Status: ACTIVE | Noted: 2022-07-12

## 2022-07-12 PROBLEM — M25.561 CHRONIC PAIN OF RIGHT KNEE: Status: ACTIVE | Noted: 2022-07-12

## 2022-07-12 PROBLEM — G89.29 CHRONIC PAIN OF RIGHT KNEE: Status: ACTIVE | Noted: 2022-07-12

## 2022-07-12 NOTE — PLAN OF CARE
[facility]  Physical Therapy Initial Evaluation    Date: 7/11/2022   Name: Gerda Lai  Clinic Number: [id number]    Therapy Diagnosis:   Encounter Diagnoses   Name Primary?    Metatarsalgia of right foot     Acquired short Achilles tendon, unspecified laterality     Right foot pain     Decreased range of motion of right lower extremity     Chronic pain of right knee      Physician: Cesar Segundo MD    Physician Orders: PT eval and treat  Medical Diagnosis:   M77.41 (ICD-10-CM) - Metatarsalgia, right foot   M67.00 (ICD-10-CM) - Short Achilles tendon (acquired), unspecified ankle       Evaluation Date: 7/11/22  Authorization Period Expiration: 12/31/22  Plan of Care Certification Period: 9/30/22  Progress Note Due: 8/11/22    Visit # / Visits authorized: 1/ 1   FOTO: 1/ 3   PTA Visit #: 0/5     Time In: 320 pm  Time Out: 400 pm  Total Appointment Time (timed & untimed codes): 40 minutes, low complex eval, MT 1, TE 1    Precautions: Standard    Subjective   Date of onset: + 1 year insidious onset and progressive increased pain  History of current condition - Gerda reports: R knee pain has been progressively worse but would like to defer surgery as much as possible and now noticed that her right foot has progressively gotten worse on the outer edges of foot.     Medical History:   Past Medical History:   Diagnosis Date    Anxiety     Arthritis     Basal cell carcinoma 09/2016    right post auricular neck     Breast cancer 1992    right    Cataract     Fibromyalgia     History of measles as a child     Hyperlipidemia     Hypertension     Personal history of colonic polyps     Pneumonia     SCC (squamous cell carcinoma) 2015    R chest    SCC (squamous cell carcinoma) 2016    left medial shoulder    SCC (squamous cell carcinoma) 2017    left knee    Shingles     Squamous cell carcinoma 2015    right forearm    Thyroid disease     Vaginitis        Surgical  History:   Gerda Lai  has a past surgical history that includes thyriodectomy; Total hip arthroplasty; tonsillectomy; Adenoidectomy; Cataract extraction w/  intraocular lens implant (Bilateral); Yag  (Left); Breast lumpectomy (Right, 1992); Breast biopsy (Left); Eye surgery; Tonsillectomy; Joint replacement (Right); Knee Arthroplasty (Left, 8/10/2020); Carpal tunnel release (Right, 3/16/2021); Hand surgery; Injection of anesthetic agent around medial branch nerves innervating lumbar facet joint (Left, 11/18/2021); Injection of anesthetic agent around medial branch nerves innervating cervical facet joint (N/A, 1/4/2022); and Radiofrequency thermal coagulation of medial branch of posterior ramus of cervical spinal nerve (Left, 3/8/2022).    Medications:   Gerda has a current medication list which includes the following prescription(s): acetaminophen, albuterol, aliskiren, aspirin, coenzyme q10, felodipine, glucosamine-chondroitin, labetalol, lactase, lorazepam, multivitamin, omeprazole, polymyxin b sulf-trimethoprim, pravastatin, turmeric, and vitamin d.    Allergies:   Review of patient's allergies indicates:   Allergen Reactions    Sulfa (sulfonamide antibiotics) Rash    Clarithromycin Other (See Comments)     Weak, extreme fatigue. dizziness    Flexeril [cyclobenzaprine] Other (See Comments)     Dizziness    Iodine and iodide containing products     Lisinopril Other (See Comments)     Cough and sensation of throat swelling/?angioedema    Losartan Rash    Metoprolol Swelling     Tightness in throat    Tramadol Other (See Comments)     Dizzy and weak    Verapamil (bulk) Palpitations    Voltaren [diclofenac sodium] Other (See Comments)     Drops blood pressure        Imaging, Narrative & Impression  EXAMINATION:  XR FOOT COMPLETE 3 VIEW RIGHT     CLINICAL HISTORY:  . Pain, unspecified     TECHNIQUE:  AP, lateral, and oblique views of the right foot were  performed.     COMPARISON:  06/07/2013     FINDINGS:  Severe hallux valgus deformity with degenerative change can be seen phalanges and metacarpals are intact.  Some narrowing can be seen at the Lisfranc joints at 2nd 3rd and 4th metatarsal levels.  Prominent calcaneal spurs are seen.     Impression:     See above        Electronically signed by: Kalin Villagran MD  Date:                                            03/29/2022  Time:                                           07:23    Prior Therapy: yes   Social History: one story house - lives alone  Occupation: Retired  Prior Level of Function: independent with ADLs, using RW and SPC  Current Level of Function: uses SPC and RW if needed, uses grabber     Pain:  Current 5/10, worst 7/10, best 3/10   Location: right feet  and knee   Description: Aching  Aggravating Factors: Standing and Walking  Easing Factors: pain medication and rest    Pts goals: to improve pain so she can walking more equally without increased pain.     Objective     Ankle ROM: (measured in degrees)    Right  Left   Resting position   10 inv   5 inv   DF   10   14   PF 45 45   inversion 38 35   eversion 15 15     Active/Passive knee ROM: (measured in degrees)    RLE LLE   Flexion 115/120 130/130   Extension 0/ 0/     Lower Extremity Strength (graded 0-5 out of 5)   RLE LLE   Ankle dorsiflexion: 5/5 5/5   Ankle plantarflexion 5/5 5/5   Knee flexion 4/5 4/5   Knee extension 4+/5 4+/5   Hip adduction 5/5 5/5   Hip abduction 4+/5 4+/5     Special Tests: ((+): pos.; (-): neg.)   · Fabers Test: NT  · Slump Test: NT  · SLR Test: NT  · ACL/MCL/PCL/LCL: NT  · [facility]/appley:NT  · Palpation: tender touch from most painful to least on R: adductor lucas, achilles, plantar fascia, rectus femoris, lateral > medial gastrocnemius  · Gait: R knee valgus moment on loading increased inversion on foot contact and pushoff with loading laterally.     CMS Impairment/Limitation/Restriction for FOTO foot  Survey    Therapist reviewed FOTO scores for Gerda Lai on 7/11/2022.   FOTO documents entered into [employer] - see Media section.    Limitation Score: completed TBD %  Category: Other           TREATMENT   Treatment Time In: 335 pm  Treatment Time Out: 400 pm  Total Treatment time (time-based codes) separate from Evaluation: 25 minutes    Gerda received therapeutic exercises to develop strength, endurance, ROM and flexibility for 10 minutes including:    Quad set with towel under knee 3 x 10 with 2 sec hold  Glut set 3 x 10 with 2 sec hold  SLR 3 x 7     Seated clams vs supine or sidelying with GTB 3 x 10    Heel raises x 30 seated vs standing  Calf stretch at wall 3 x 30 secs  gastroc stretch with belt 3 x 30 secs  Soleus stretch with belt 3 x 30 secs  Plantar fascia stretch 3 x 30 secs  Lacrosse ball roll over plantar surface x 2 min anterior to posterior and then medial to lateral across Metatarsal near MTP.    Gerda received the following manual therapy techniques: Joint mobilizations, Manual traction, Myofacial release and Soft tissue Mobilization were applied to the: gastrocnemius, adductor lucas, achilles tendon for 15 minutes, including:  PA and AP individual metatarsal to metatarsal.   Long axis traction into DF  Short axis traction with hands just distal to knee.           Gerda received hot pack for 0 minutes to .    Gerda received cold pack for 0 minutes to .    Home Exercises and Patient Education Provided    Education provided:   - HEP, pain management, mechanism of injury    Written Home Exercises Provided: Patient instructed to cont prior HEP.  Exercises were reviewed and Gerda was able to demonstrate them prior to the end of the session.  Gerda demonstrated good  understanding of the education provided.     See EMR under Patient Instructions for exercises provided 7/11/2022.    Assessment   Gerda is a 89 y.o. female referred to outpatient Physical Therapy with a medical diagnosis of    M77.41 (ICD-10-CM) - Metatarsalgia, right foot   M67.00 (ICD-10-CM) - Short Achilles tendon (acquired), unspecified ankle     . Pt presents with R foot pain on lateral surface limited by bunion at first MTP and R knee OA severe leading to valgus leading cause of her foot pain as she load on lateral aspect of foot and overuse of supination and PF to maximal pushoff which is less efficient due to R knee pain. PT to address both R knee and foot /ankle deficit as the R knee is likely limiting factor with RLE pain and with the 1st MTP bunion not likely to improve. Pt education on finding and receptive with limits due to R knee pain    Pt prognosis is Good.   Pt will benefit from skilled outpatient Physical Therapy to address the deficits stated above and in the chart below, provide pt/family education, and to maximize pt's level of independence.     Plan of care discussed with patient: Yes  Pt's spiritual, cultural and educational needs considered and patient is agreeable to the plan of care and goals as stated below:     Anticipated Barriers for therapy: R knee pain and bunion at 1st MTP    Medical Necessity is demonstrated by the following  History  Co-morbidities and personal factors that may impact the plan of care Co-morbidities:   see PMHx    Personal Factors:   age     low   Examination  Body Structures and Functions, activity limitations and participation restrictions that may impact the plan of care Body Regions:   lower extremities    Body Systems:    gross symmetry  ROM  strength  gross coordinated movement  balance  gait    Participation Restrictions:   none    Activity limitations:   Learning and applying knowledge  no deficits    General Tasks and Commands  no deficits    Communication  no deficits    Mobility  lifting and carrying objects  walking    Self care  dressing    Domestic Life  shopping  cooking  doing house work (cleaning house, washing dishes, laundry)    Interactions/Relationships  no  deficits    Life Areas  no deficits    Community and Social Life  community life  recreation and leisure         low   Clinical Presentation stable and uncomplicated low   Decision Making/ Complexity Score: low     Goals:STG 3 weeks  1.  Pt to be independent with HEP for increased functional mobility and pain control.  2.  Pt to increase AROM at R ankle DF 15 degs for increased functional mobility and transfers.  3.  Pt to decrease pain to less than 2/10 after session for increased functional mobility.    Long Term Goals: 8 weeks   1.  Pt to be independent with pain management strategies and verbalize 2 strategies for increased functional mobility and pain control.  2.  Pt to increase AROM at R knee range 0-120 degs for increased functional mobility and transfers.  3.  Pt to decrease pain to less than 0/10 after session for increased functional mobility.  4.  Pt to increase standing tolerance to 45 minutes cooking or shopping to increased overall IADLs without increased pain.   5. Pt demonstrate proper heel strike, no valgus moment on loading and proper pushoff without excessive supination on RLE for increased efficiency with gait.     Plan   Plan of care Certification: 7/11/2022 to 10/31/22.    Outpatient Physical Therapy 2 times weekly for 10 weeks to include the following interventions: Gait Training, Manual Therapy, Moist Heat/ Ice, Patient Education and Therapeutic Exercise.     Jordana Siegel, PT

## 2022-07-13 ENCOUNTER — OFFICE VISIT (OUTPATIENT)
Dept: DERMATOLOGY | Facility: CLINIC | Age: 87
End: 2022-07-13
Payer: MEDICARE

## 2022-07-13 DIAGNOSIS — Z09 POSTOP CHECK: Primary | ICD-10-CM

## 2022-07-13 PROCEDURE — 99024 PR POST-OP FOLLOW-UP VISIT: ICD-10-PCS | Mod: POP,,, | Performed by: DERMATOLOGY

## 2022-07-13 PROCEDURE — 99024 POSTOP FOLLOW-UP VISIT: CPT | Mod: POP,,, | Performed by: DERMATOLOGY

## 2022-07-13 NOTE — PROGRESS NOTES
89 y.o. female patient is here for suture removal following Mohs' surgery.    Patient reports no problems to the left upper chest.    WOUND PE:  The left upper chest sutures intact. Wound healing well. Good skin edges. No signs or symptoms of infection.    IMPRESSION:  Healing operative site from Mohs' surgery SCC L upper chest s/p Mohs surgery with CLC repair, postop day # 14.    PLAN:  Sutures removed today. Steri-strips applied.  Continue wound care.  Keep moist with Aquaphor.    RTC:  Patient already has an appt to see Dr. Curtis on 8/15/2022 at 3 pm for excision of right arm SCC.

## 2022-07-14 PROBLEM — M75.101 RIGHT ROTATOR CUFF TEAR ARTHROPATHY: Status: RESOLVED | Noted: 2018-05-08 | Resolved: 2022-07-14

## 2022-07-14 PROBLEM — M70.62 TROCHANTERIC BURSITIS, LEFT HIP: Status: RESOLVED | Noted: 2017-01-11 | Resolved: 2022-07-14

## 2022-07-14 PROBLEM — R29.898 DECREASED ROM OF NECK: Status: RESOLVED | Noted: 2021-02-01 | Resolved: 2022-07-14

## 2022-07-14 PROBLEM — M25.619 DECREASED SHOULDER MOBILITY: Status: RESOLVED | Noted: 2019-08-01 | Resolved: 2022-07-14

## 2022-07-14 PROBLEM — M54.2 CHRONIC NECK PAIN: Status: RESOLVED | Noted: 2021-11-08 | Resolved: 2022-07-14

## 2022-07-14 PROBLEM — M54.50 LOW BACK PAIN: Status: RESOLVED | Noted: 2021-08-20 | Resolved: 2022-07-14

## 2022-07-14 PROBLEM — M16.12 OSTEOARTHRITIS OF LEFT HIP: Status: RESOLVED | Noted: 2017-04-21 | Resolved: 2022-07-14

## 2022-07-14 PROBLEM — M25.662 DECREASED RANGE OF MOTION (ROM) OF LEFT KNEE: Status: RESOLVED | Noted: 2020-09-02 | Resolved: 2022-07-14

## 2022-07-14 PROBLEM — M25.511 RIGHT SHOULDER PAIN: Status: RESOLVED | Noted: 2018-09-07 | Resolved: 2022-07-14

## 2022-07-14 PROBLEM — M25.562 ACUTE PAIN OF LEFT KNEE: Status: RESOLVED | Noted: 2020-09-02 | Resolved: 2022-07-14

## 2022-07-14 PROBLEM — G89.29 CHRONIC PAIN OF RIGHT KNEE: Status: RESOLVED | Noted: 2022-07-12 | Resolved: 2022-07-14

## 2022-07-14 PROBLEM — R20.2 PARESTHESIA OF LEFT LEG: Status: RESOLVED | Noted: 2017-01-11 | Resolved: 2022-07-14

## 2022-07-14 PROBLEM — M70.61 GREATER TROCHANTERIC BURSITIS OF RIGHT HIP: Status: RESOLVED | Noted: 2017-10-20 | Resolved: 2022-07-14

## 2022-07-14 PROBLEM — G89.29 CHRONIC NECK PAIN: Status: RESOLVED | Noted: 2021-11-08 | Resolved: 2022-07-14

## 2022-07-14 PROBLEM — M25.561 CHRONIC PAIN OF RIGHT KNEE: Status: RESOLVED | Noted: 2022-07-12 | Resolved: 2022-07-14

## 2022-07-14 PROBLEM — M12.811 RIGHT ROTATOR CUFF TEAR ARTHROPATHY: Status: RESOLVED | Noted: 2018-05-08 | Resolved: 2022-07-14

## 2022-07-14 PROBLEM — G56.00 CARPAL TUNNEL SYNDROME: Status: RESOLVED | Noted: 2021-03-14 | Resolved: 2022-07-14

## 2022-07-14 PROBLEM — Z96.652 STATUS POST TOTAL LEFT KNEE REPLACEMENT: Status: RESOLVED | Noted: 2020-08-07 | Resolved: 2022-07-14

## 2022-07-14 PROBLEM — R53.1 DECREASED STRENGTH: Status: RESOLVED | Noted: 2020-09-02 | Resolved: 2022-07-14

## 2022-07-14 PROBLEM — M40.04 POSTURAL KYPHOSIS OF THORACIC REGION: Status: RESOLVED | Noted: 2021-02-02 | Resolved: 2022-07-14

## 2022-07-14 PROBLEM — S46.811A TEAR OF RIGHT INFRASPINATUS TENDON: Status: RESOLVED | Noted: 2017-10-20 | Resolved: 2022-07-14

## 2022-07-14 PROBLEM — M25.661 DECREASED RANGE OF MOTION OF RIGHT LOWER EXTREMITY: Status: RESOLVED | Noted: 2022-07-12 | Resolved: 2022-07-14

## 2022-07-25 ENCOUNTER — OFFICE VISIT (OUTPATIENT)
Dept: INTERNAL MEDICINE | Facility: CLINIC | Age: 87
End: 2022-07-25
Payer: MEDICARE

## 2022-07-25 VITALS
DIASTOLIC BLOOD PRESSURE: 80 MMHG | SYSTOLIC BLOOD PRESSURE: 138 MMHG | WEIGHT: 138 LBS | OXYGEN SATURATION: 96 % | BODY MASS INDEX: 24.45 KG/M2 | HEART RATE: 73 BPM | HEIGHT: 63 IN | RESPIRATION RATE: 14 BRPM

## 2022-07-25 DIAGNOSIS — N18.30 STAGE 3 CHRONIC KIDNEY DISEASE, UNSPECIFIED WHETHER STAGE 3A OR 3B CKD: Chronic | ICD-10-CM

## 2022-07-25 DIAGNOSIS — Z96.652 STATUS POST TOTAL LEFT KNEE REPLACEMENT: ICD-10-CM

## 2022-07-25 DIAGNOSIS — M54.17 LUMBOSACRAL RADICULOPATHY AT L5: ICD-10-CM

## 2022-07-25 DIAGNOSIS — D53.9 MACROCYTIC ANEMIA: ICD-10-CM

## 2022-07-25 DIAGNOSIS — Z85.828 PERSONAL HISTORY OF SKIN CANCER: ICD-10-CM

## 2022-07-25 DIAGNOSIS — Z00.00 ENCOUNTER FOR PREVENTIVE HEALTH EXAMINATION: ICD-10-CM

## 2022-07-25 DIAGNOSIS — H90.3 SENSORINEURAL HEARING LOSS (SNHL) OF BOTH EARS: ICD-10-CM

## 2022-07-25 DIAGNOSIS — Z91.81 RISK FOR FALLS: ICD-10-CM

## 2022-07-25 DIAGNOSIS — M40.04 POSTURAL KYPHOSIS OF THORACIC REGION: Primary | ICD-10-CM

## 2022-07-25 DIAGNOSIS — F41.9 ANXIETY: Chronic | ICD-10-CM

## 2022-07-25 DIAGNOSIS — R79.9 ABNORMAL BLOOD CHEMISTRY TEST: ICD-10-CM

## 2022-07-25 DIAGNOSIS — I65.29 STENOSIS OF CAROTID ARTERY, UNSPECIFIED LATERALITY: ICD-10-CM

## 2022-07-25 DIAGNOSIS — Z74.09 IMPAIRED FUNCTIONAL MOBILITY, BALANCE, AND ENDURANCE: ICD-10-CM

## 2022-07-25 DIAGNOSIS — R54 FRAIL ELDERLY: ICD-10-CM

## 2022-07-25 DIAGNOSIS — M85.80 OSTEOPENIA, UNSPECIFIED LOCATION: ICD-10-CM

## 2022-07-25 DIAGNOSIS — M47.26 OTHER SPONDYLOSIS WITH RADICULOPATHY, LUMBAR REGION: ICD-10-CM

## 2022-07-25 DIAGNOSIS — M25.561 CHRONIC PAIN OF RIGHT KNEE: ICD-10-CM

## 2022-07-25 DIAGNOSIS — M47.812 CERVICAL SPONDYLOSIS: ICD-10-CM

## 2022-07-25 DIAGNOSIS — Z99.89 DEPENDENCE ON OTHER ENABLING MACHINES AND DEVICES: ICD-10-CM

## 2022-07-25 DIAGNOSIS — M15.8 OTHER OSTEOARTHRITIS INVOLVING MULTIPLE JOINTS: ICD-10-CM

## 2022-07-25 DIAGNOSIS — Z85.3 PERSONAL HISTORY OF BREAST CANCER: ICD-10-CM

## 2022-07-25 DIAGNOSIS — Z74.09 OTHER REDUCED MOBILITY: ICD-10-CM

## 2022-07-25 DIAGNOSIS — E78.5 HYPERLIPIDEMIA, UNSPECIFIED HYPERLIPIDEMIA TYPE: Chronic | ICD-10-CM

## 2022-07-25 DIAGNOSIS — M17.12 PRIMARY OSTEOARTHRITIS OF LEFT KNEE: ICD-10-CM

## 2022-07-25 DIAGNOSIS — M79.671 RIGHT FOOT PAIN: ICD-10-CM

## 2022-07-25 DIAGNOSIS — Z96.641 HISTORY OF TOTAL RIGHT HIP REPLACEMENT: ICD-10-CM

## 2022-07-25 DIAGNOSIS — Z74.09 IMPAIRED FUNCTIONAL MOBILITY, BALANCE, GAIT, AND ENDURANCE: ICD-10-CM

## 2022-07-25 DIAGNOSIS — I70.0 AORTIC ATHEROSCLEROSIS: Chronic | ICD-10-CM

## 2022-07-25 DIAGNOSIS — G89.29 CHRONIC PAIN OF RIGHT KNEE: ICD-10-CM

## 2022-07-25 DIAGNOSIS — I77.1 SUBCLAVIAN ARTERY STENOSIS, RIGHT: ICD-10-CM

## 2022-07-25 DIAGNOSIS — I10 HYPERTENSION, ESSENTIAL: Chronic | ICD-10-CM

## 2022-07-25 PROCEDURE — 99215 OFFICE O/P EST HI 40 MIN: CPT | Mod: PBBFAC,PO | Performed by: NURSE PRACTITIONER

## 2022-07-25 PROCEDURE — G0439 PR MEDICARE ANNUAL WELLNESS SUBSEQUENT VISIT: ICD-10-PCS | Mod: ,,, | Performed by: NURSE PRACTITIONER

## 2022-07-25 PROCEDURE — 99999 PR PBB SHADOW E&M-EST. PATIENT-LVL V: CPT | Mod: PBBFAC,,, | Performed by: NURSE PRACTITIONER

## 2022-07-25 PROCEDURE — 99999 PR PBB SHADOW E&M-EST. PATIENT-LVL V: ICD-10-PCS | Mod: PBBFAC,,, | Performed by: NURSE PRACTITIONER

## 2022-07-25 PROCEDURE — G0439 PPPS, SUBSEQ VISIT: HCPCS | Mod: ,,, | Performed by: NURSE PRACTITIONER

## 2022-07-25 NOTE — PATIENT INSTRUCTIONS
Counseling and Referral of Other Preventative  (Italic type indicates deductible and co-insurance are waived)    Patient Name: Gerda Lai  Today's Date: 7/25/2022    Health Maintenance       Date Due Completion Date    COVID-19 Vaccine (4 - Booster for Pfizer series) 08/01/2022 (Originally 3/2/2022)   11/2/2021    Shingles Vaccine (1 of 2) Declined   ---    Influenza Vaccine (1) 09/01/2022   10/18/2021    DEXA Scan 01/28/2024 1/28/2022    TETANUS VACCINE 08/04/2025 8/4/2015    Lipid Panel    Prolonged timed get up & go test- use cane- currently getting physical therapy    Prevention of falls handouts     06/14/2023 6/14/2022        No orders of the defined types were placed in this encounter.    The following information is provided to all patients.  This information is to help you find resources for any of the problems found today that may be affecting your health:                Living healthy guide: www.[facility].[place].[facility]      Understanding Diabetes: www.diabetes.org      Eating healthy: www.cdc.gov/healthyweight      [facility] home safety checklist: www.cdc.gov/steadi/patient.html      Agency on Aging: www.goea.[place].gov      Alcoholics anonymous (AA): www.aa.org      Physical Activity: www.jose enrique.nih.gov/pj3jsda      Tobacco use: www.quitwithusla.org

## 2022-07-25 NOTE — PROGRESS NOTES
"Gerda Lai presented for a  Medicare AWV and comprehensive Health Risk Assessment today. The following components were reviewed and updated:    · Medical history  · Family History  · Social history  · Allergies and Current Medications  · Health Risk Assessment  · Health Maintenance  · Care Team     ** See Completed Assessments for Annual Wellness Visit within the encounter summary.**       The following assessments were completed:  · Living Situation  · CAGE  · Depression Screening  · Timed Get Up and Go  · Whisper Test  · Cognitive Function Screening  · Nutrition Screening  · ADL Screening  · PAQ Screening    Vitals:    07/25/22 1318   BP: 138/80   BP Location: Left arm   Pulse: 73   Resp: 14   SpO2: 96%   Weight: 62.6 kg (138 lb)   Height: 5' 3" (1.6 m)     Body mass index is 24.45 kg/m².  Physical Exam  Constitutional:       Appearance: She is well-developed.   HENT:      Head: Normocephalic.      Comments: Wears face mask, hear aide     Right Ear: External ear normal.      Left Ear: External ear normal.   Eyes:      General: No scleral icterus.  Cardiovascular:      Rate and Rhythm: Regular rhythm.      Heart sounds: Murmur heard.   Pulmonary:      Breath sounds: Normal breath sounds.   Abdominal:      Palpations: Abdomen is soft.      Comments: overweight   Musculoskeletal:         General: No swelling.      Comments: amb w cane   Skin:     General: Skin is warm and dry.   Neurological:      Mental Status: She is alert and oriented to person, place, and time.      Motor: No abnormal muscle tone.      Coordination: Coordination normal.      Deep Tendon Reflexes: Reflexes are normal and symmetric.   Psychiatric:         Mood and Affect: Mood normal.         Behavior: Behavior normal.         Thought Content: Thought content normal.         Judgment: Judgment normal.           Lab Results   Component Value Date    HGBA1C 5.5 05/27/2011    CHOL 173 06/14/2022    HDL 52 06/14/2022    LDLCALC 91.0 06/14/2022    " TRIG 150 06/14/2022    CHOLHDL 30.1 06/14/2022      Results for orders placed in visit on 01/28/22    DXA Bone Density Spine And Hip    Narrative  EXAMINATION:  DEXA BONE DENSITY SPINE HIP    CLINICAL HISTORY:  Asymptomatic menopausal state.  89 y/o female with no history of fractures.  She had a hysterectomy at 41 y/o.  She is taking Vit D supplements.  She has a history of Radiation Treatment and Breast Ca.  She does not exercise or smoke.    TECHNIQUE:  DXA specification: Authenticlick A ([id number])    Bone Mineral Density scanning was performed over the hip and lumbar spine.    Review of the images confirms satisfactory positioning and technique.    COMPARISON:  Comparison study done on 01/31/2019.    Lumbar spine:    BMD 1.502 g/cm2 and T-score 4.1.    Total Hip:           BMD 0.883 g/cm2 and T-score -0.5.    Distal 1/3 radius: Not applicable    FINDINGS:  Lumbar spine (L1-L4):              BMD is 1.697 g/cm2, T-score is 5.9, and Z-score is 8.8.    Total hip:                                BMD is 0.927 g/cm2, T-score is -0.1, and Z-score is 2.2.    Femoral neck:                          BMD is 0.758 g/cm2, T-score is -0.8, and Z-score is 1.7.    Distal 1/3 radius:                      Not applicable    FRAX:    9% risk of a major osteoporotic fracture in the next 10 years.    2.3% risk of hip fracture in the next 10 years.    Impression  *Normal bone mineral density  *Compared with previous DXA, BMD at the lumbar spine has increased which is likely due to DJD and the BMD at the total hip has declined    RECOMMENDATIONS:  *Daily calcium intake 4693-6783 mg, dietary sources preferred; Vitamin D 5653-3492 IU daily.  *Weight bearing exercise and fall precautions.  *No additional pharmacologic therapy recommended at this time.  *Repeat BMD in 2 years    EXPLANATION OF RESULTS:  T-score compares these results to the average bone density of a 20-29 year-old of the same gender.    Z-score compares this  result to the average bone density to people of the same age, gender, and race.    The amounts indicate the number of standard deviations above or below the mean.    * Osteoporosis is generally defined as having a T-score between less than or equal to -2.5.    * Low bone mass (osteopenia) is generally defined as having a T-score between -1.0 and -2.5.    * The normal range is generally defined as having a T-score greater than or equal to -1.0.    * Calculated FRAX scores for fracture risk prediction may not be accurate in the setting of certain clinical factors such as pharmacologic therapy for osteoporosis, prior fragility fractures, high dose glucocorticoid use.      Electronically signed by: Keshia Ramesh MD  Date:    02/07/2022  Time:    09:45    No results found for this or any previous visit.                Diagnoses and health risks identified today and associated recommendations/orders:    1. Postural kyphosis of thoracic region  Stable- followed by PCP    2. Abnormal aldosterone/renin ratio  Stable- followed by PCP    3. Aortic atherosclerosis  Stable- followed by PCP, cardiology    4. Anxiety  Stable- followed by PCP    5. Stenosis of carotid artery, unspecified laterality  Stable- followed by PCP, cardiology      6. Cervical spondylosis  Stable- followed by PCP    7. Chronic pain of right knee  Stable- followed by PCP, ortho    8. Stage 3 chronic kidney disease, unspecified whether stage 3a or 3b CKD  Stable- followed by PCP    9. Frail elderly  Stable- followed by PCP    10. Sensorineural hearing loss (SNHL) of both ears  Stable- followed by PCP, audiology    11. History of total right hip replacement  Stable- followed by PCP, orhto    12. Hyperlipidemia, unspecified hyperlipidemia type  Stable- followed by PCP, cardiology      13. Lumbosacral radiculopathy at L5  Stable- followed by PCP, pain mgt    14. Impaired functional mobility, balance, gait, and endurance  Stable- followed by PCP, orhto    15.  Hypertension, essential  Stable- followed by PCP, cardiology      16. Macrocytic anemia  Stable- followed by PCP    17. Other osteoarthritis involving multiple joints  Stable- followed by PCP    18. Osteopenia, unspecified location  Stable- followed by PCP    19. Other spondylosis with radiculopathy, lumbar region  Stable- followed by PCP    20. Personal history of breast cancer  Stable- followed by PCP    21. Personal history of skin cancer  Stable- followed by PCP, derm    22. Primary osteoarthritis of left knee s/p TKA 8/10/20  Stable- followed by PCP    23. Right foot pain  Stable- followed by PCP    24. Risk for falls  Stable- followed by PCP    25. Status post total left knee replacement 8/10/2020  Stable- followed by PCP    26. Subclavian artery stenosis, right  Stable- followed by PCP, cardiology      27. Impaired functional mobility, balance, and endurance  Stable- followed by PCP    28. Dependence on other enabling machines and devices  Stable- followed by PCP    29. Other reduced mobility  Stable- followed by PCP    30. Encounter for preventive health examination  Here for Health Risk Assessment/Annual Wellness Visit.  Health maintenance reviewed and updated. Follow up in one year.         Provided Gerda with a 5-10 year written screening schedule and personal prevention plan. Recommendations were developed using the USPSTF age appropriate recommendations. Education, counseling, and referrals were provided as needed. After Visit Summary printed and given to patient which includes a list of additional screenings\tests needed. Prolonged timed get up & go test- fall risk handouts.- currently receiving physical therapy.    Follow up in about 1 year (around 7/25/2023) for HRA.    Raina Mora NP I offered to discuss advanced care planning, including how to pick a person who would make decisions for you if you were unable to make them for yourself, called a health care power of , and what kind of  decisions you might make such as use of life sustaining treatments such as ventilators and tube feeding when faced with a life limiting illness recorded on a living will that they will need to know. (How you want to be cared for as you near the end of your natural life)     X Patient is interested in learning more about how to make advanced directives.  I provided them paperwork and offered to discuss this with them.

## 2022-07-26 ENCOUNTER — CLINICAL SUPPORT (OUTPATIENT)
Dept: REHABILITATION | Facility: HOSPITAL | Age: 87
End: 2022-07-26
Attending: ORTHOPAEDIC SURGERY
Payer: MEDICARE

## 2022-07-26 DIAGNOSIS — R29.898 WEAKNESS OF BOTH LOWER EXTREMITIES: ICD-10-CM

## 2022-07-26 DIAGNOSIS — M79.671 RIGHT FOOT PAIN: Primary | ICD-10-CM

## 2022-07-26 DIAGNOSIS — M79.604 ACUTE PAIN OF RIGHT LOWER EXTREMITY: ICD-10-CM

## 2022-07-26 PROCEDURE — 97140 MANUAL THERAPY 1/> REGIONS: CPT | Mod: PO

## 2022-07-26 PROCEDURE — 97110 THERAPEUTIC EXERCISES: CPT | Mod: PO

## 2022-07-26 NOTE — PROGRESS NOTES
[facility]   Physical Therapy Treatment Note     Name: Gerda Lai  Clinic Number: [id number]    Therapy Diagnosis:   Encounter Diagnoses   Name Primary?   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  She was compliant with home exercise program.  Response to previous treatment: mild relief  Functional change: none    Pain: 5/10  Location: right ankles and feet      OBJECTIVE     Objective Measures updated at progress report unless specified.       TREATMENT     Total Treatment time (time-based codes) separate from Evaluation: 40 minutes     Gerda received the treatments listed below:    Gerda received therapeutic exercises to develop strength, endurance, ROM and flexibility for 30 minutes including:  One set of each today to review HEP     Day 1 exercises:    Bridges with add ball 3 x 10  Bridges with abd GTB 3 x 10  Supine clams GTB 3 x 10    Hip add/frog stretch 3 x 30 secs  Trunk rotation stretch 3 x 30 secs  SKTC 3 x 30 secs    Quad set with towel under knee 3 x 10 with 2 sec hold  Glut set 3 x 10 with 2 sec hold  SLR 3 x 7       Day 2 exercises:    Seated clams vs supine or sidelying with GTB 3 x 10     Heel raises x 30 seated vs standing  Calf stretch at wall  3 x 30 secs  gastroc stretch with belt 3 x 30 secs  Soleus stretch with belt 3 x 30 secs  Plantar fascia stretch 3 x 30 secs    Lacrosse ball roll over plantar surface x 2 min anterior to posterior and then medial to lateral across Metatarsal near MTP.     Gerda received the following manual therapy techniques: Joint mobilizations, Manual traction, Myofacial release and Soft tissue Mobilization were applied to the: gastrocnemius, adductor lucas, achilles tendon for 15 minutes, including:  PA and AP individual metatarsal to metatarsal.   Long axis traction into DF  Short axis traction with hands just distal to knee.     PATIENT EDUCATION AND HOME EXERCISES     Home Exercises Provided and Patient Education Provided     Education provided:   - HEP, pain management, mechanism of injury     Written Home Exercises Provided: Patient instructed to cont prior HEP.  Exercises were reviewed and Gerda was able to demonstrate them prior to the end of the session.  Gerda demonstrated good  understanding of the education provided.      See EMR under Patient Instructions for exercises provided 7/11/2022.    ASSESSMENT     Pt with good tolerance with exercise. Pt education with performance to tolerance and with less reps or less range if too difficult or painful. Pt with good tolerance and execution of HEP today.     Gerda Is progressing well towards her goals.   Pt prognosis is Good.     Pt will continue to benefit from skilled outpatient physical therapy to address the deficits listed in the problem list box on initial evaluation, provide pt/family education and to maximize pt's level of independence in the home and community environment.     Pt's spiritual, cultural and educational needs considered and pt agreeable to plan of care and goals.     Anticipated barriers to physical therapy: none    Goals:   STG 3 weeks  1.  Pt to be independent with HEP for increased functional mobility and pain control. Progressing 7/26/22  2.   Pt to increase AROM at R ankle DF 15 degs for increased functional mobility and transfers.  3.  Pt to decrease pain to less than 2/10 after session for increased functional mobility. Progressing 7/26/22     Long Term Goals: 8 weeks   1.  Pt to be independent with pain management strategies and verbalize 2 strategies for increased functional mobility and pain control.  2.  Pt to increase AROM at R knee range 0-120 degs for increased functional mobility and transfers.  3.  Pt to decrease pain to less than 0/10 after session for increased functional mobility.  4.  Pt to increase standing tolerance to 45 minutes cooking or shopping to increased overall IADLs without increased pain.   5. Pt demonstrate proper heel strike, no valgus moment on loading and proper pushoff without excessive supination on RLE for increased efficiency with gait      PLAN     Cont with POC    Jordana Siegel, PT

## 2022-08-01 ENCOUNTER — PROCEDURE VISIT (OUTPATIENT)
Dept: DERMATOLOGY | Facility: CLINIC | Age: 87
End: 2022-08-01
Payer: MEDICARE

## 2022-08-01 DIAGNOSIS — C44.622 SQUAMOUS CELL CARCINOMA OF ARM, RIGHT: Primary | ICD-10-CM

## 2022-08-01 PROCEDURE — 88305 TISSUE EXAM BY PATHOLOGIST: ICD-10-PCS | Mod: 26,,, | Performed by: PATHOLOGY

## 2022-08-01 PROCEDURE — 99499 UNLISTED E&M SERVICE: CPT | Mod: S$PBB,,, | Performed by: DERMATOLOGY

## 2022-08-01 PROCEDURE — 12032 INTMD RPR S/A/T/EXT 2.6-7.5: CPT | Mod: S$PBB,,, | Performed by: DERMATOLOGY

## 2022-08-01 PROCEDURE — 88305 TISSUE EXAM BY PATHOLOGIST: CPT | Mod: 26,,, | Performed by: PATHOLOGY

## 2022-08-01 PROCEDURE — 88305 TISSUE EXAM BY PATHOLOGIST: CPT | Performed by: PATHOLOGY

## 2022-08-01 PROCEDURE — 11602 EXC TR-EXT MAL+MARG 1.1-2 CM: CPT | Mod: PBBFAC,PO | Performed by: DERMATOLOGY

## 2022-08-01 PROCEDURE — 11602 PR EXC SKIN MALIG 1.1-2 CM TRUNK,ARM,LEG: ICD-10-PCS | Mod: S$PBB,51,, | Performed by: DERMATOLOGY

## 2022-08-01 PROCEDURE — 99499 NO LOS: ICD-10-PCS | Mod: S$PBB,,, | Performed by: DERMATOLOGY

## 2022-08-01 PROCEDURE — 12032 PR LAYR CLOS WND TRUNK,ARM,LEG 2.6-7.5 CM: ICD-10-PCS | Mod: S$PBB,,, | Performed by: DERMATOLOGY

## 2022-08-01 PROCEDURE — 12032 INTMD RPR S/A/T/EXT 2.6-7.5: CPT | Mod: PBBFAC,PO | Performed by: DERMATOLOGY

## 2022-08-01 PROCEDURE — 11602 EXC TR-EXT MAL+MARG 1.1-2 CM: CPT | Mod: S$PBB,51,, | Performed by: DERMATOLOGY

## 2022-08-01 NOTE — PROGRESS NOTES
PROCEDURE: Elliptical excision with intermediate layered repair in order to close large gap.    ANESTHETIC: 9.0 cc 1% Xylocaine with Epinephrine 1:100,000, buffered    SURGEON: Bouchra Curtis M.D.    ASSISTANTS: Melanie Steiner LPN    PREOPERATIVE DIAGNOSIS:  Biopsy-proven Squamous Cell Carcinoma    POSTOPERATIVE DIAGNOSIS:  Same as preoperative diagnosis    PATHOLOGIC DIAGNOSIS: Pending    LOCATION: right  Upper arm     INITIAL LESION SIZE: 1.2 cm    EXCISED DIAMETER: 2.0 cm    PREPARATION: The diagnosis, procedure, alternatives, benefits and risks, including but not limited to: infection, bleeding/bruising, drug reactions, pain, scar or cosmetic defect, local sensation disturbances, wound dehiscence (separation of wound edges after sutures removed) and/or recurrence of present condition were explained to the patient. The patient elected to proceed.  Patient's identity was verified using 2 patient identifiers and the side and site was verified.  Time out period with surgeon, assistant and patient in surgical suite was taken.    PROCEDURE: The location noted above was prepped, draped, and anesthetized in the usual sterile fashion per Dr. Bouchra Curtis. Lesional tissue was carefully marked with at least 4 mm margins of clinically normal skin in all directions. A fusiform elliptical excision was done with #15 blade carried down completely through the dermis into the deep subcutaneous tissues to the level of the non-muscle fascia, and dissection was carried out in that plane.  Electrocoagulation was used to obtain hemostasis. Blood loss was minimal. The wound was then approximated in a layered fashion with subcutaneous and intradermal sutures of 5.0 Monocryl, approximately 2 in number, and the wound was then superficially closed with simple interrupted sutures of 4.0 Prolene.    The patient tolerated the procedure well.    The area was cleaned and dressed appropriately and the patient was given wound care instructions  with bactroban , as well as an appointment for follow-up evaluation.    LENGTH OF REPAIR: 5.0 cm

## 2022-08-01 NOTE — PATIENT INSTRUCTIONS
Post- Operative Wound Care    Your doctor has performed local skin surgery today.  Vaseline ointment and a pressure bandage were placed after the surgery.  It is very important that you keep this bandage in place for 24 hours.  It is also recommended that you do not exercise or do any other activities that will increase your heart rate. This will decrease the risk of post - operative infection and bleeding.  After 24 hours, you may remove the band aid and wash the area with warm soap and water and apply Vaseline ointment.  Many patients prefer to use Neosporin or Bacitracin ointment.  This is acceptable; however know that you can develop an allergy to this medication even if you have used it safely for years.  It is important to keep the area moist.  Letting it dry out and get air slows healing time, will worsen the scar, and make it more difficult to remove the stitches.  Band aid is optional after first 24 hours.    Post skin surgery discomfort is normal, but significant pain is not.  It is highly recommended that if you are having discomfort take 1000 mg tylenol/acetaminophen alternating every 3 hours with 400 mg of Advil/Motrin/Ibuprofen. This has been proven to provide significant pain control. Please only use these medications so long as you have no other contraindications to either of them.     We have someone on call 24 Hours daily should you need to reach us- please call [phone] and ask the  to page Dermatology On Call resident.          If you notice increasing redness, tenderness, pain, or yellow drainage at the biopsy or surgical site, please notify your doctor.  These are signs of an infection.    If your biopsy/surgical site is bleeding, apply firm pressure for 15 minutes straight.  Repeat for another 15 minutes, if it is still bleeding.   If the surgical site continues to bleed, then please contact your doctor.      For [employer] users:   You will receive your pathology results in  [facility] as soon as they are available. Please be assured that your physician/provider will review your results and contact you should additional treatment be required. This is one more way [facility] is putting you first.       [facility] [place] [address]/ [phone] FAX/ [email or web address]

## 2022-08-02 ENCOUNTER — CLINICAL SUPPORT (OUTPATIENT)
Dept: REHABILITATION | Facility: HOSPITAL | Age: 87
End: 2022-08-02
Attending: ORTHOPAEDIC SURGERY
Payer: MEDICARE

## 2022-08-02 DIAGNOSIS — M79.671 RIGHT FOOT PAIN: ICD-10-CM

## 2022-08-02 DIAGNOSIS — R29.898 WEAKNESS OF BOTH LOWER EXTREMITIES: ICD-10-CM

## 2022-08-02 DIAGNOSIS — M79.604 ACUTE PAIN OF RIGHT LOWER EXTREMITY: Primary | ICD-10-CM

## 2022-08-02 PROCEDURE — 97110 THERAPEUTIC EXERCISES: CPT | Mod: PO,CQ

## 2022-08-02 NOTE — PROGRESS NOTES
[facility]   Physical Therapy Treatment Note     Name: Gerda Lai  Clinic Number: [id number]    Therapy Diagnosis:   Encounter Diagnoses   Name Primary?    Acute pain of right lower extremity Yes    Right foot pain     Weakness of both lower extremities      Physician: Cesar Segundo MD    Visit Date: 8/2/2022    Physician Orders: PT eval and treat  Medical Diagnosis:   M77.41 (ICD-10-CM) - Metatarsalgia, right foot   M67.00 (ICD-10-CM) - Short Achilles tendon (acquired), unspecified ankle         Evaluation Date: 7/11/22  Authorization Period Expiration: 12/31/22  Plan of Care Certification Period: 9/30/22  Progress Note Due: 8/11/22    Visit # / Visits authorized: 2/20  FOTO: 1/ 3   PTA Visit #: 1/5       Precautions: Standard    Time In: 1:45 pm  Time Out: 2:30 pm  Total Billable Time: 45 minutes TE 2, MT 1      SUBJECTIVE     Pt reports: her ankles feel good today.   She was compliant with home exercise program.  Response to previous treatment: mild relief  Functional change: none    Pain: 2/10  Location: right ankles and feet      OBJECTIVE     Objective Measures updated at progress report unless specified.       TREATMENT     Total Treatment time (time-based codes) separate from Evaluation: 40 minutes     Gerda received the treatments listed below:    Gerda received therapeutic exercises to develop strength, endurance, ROM and flexibility for 30 minutes including:  One set of each today to review HEP     Day 1 exercises:    Bridges with add ball 3 x 10  Bridges with abd GTB 3 x 10  Supine clams GTB 3 x 10    Hip add/frog stretch 3 x 30 secs  Trunk rotation stretch 3 x 30 secs  SKTC 3 x 30 secs    Quad set with towel under knee 3 x 10 with 2 sec hold  Glut set 3 x 10 with 2 sec hold  SLR 3 x 7     Day 2 exercises:  Seated clams vs supine or sidelying with GTB 3 x 10     Heel raises x 30 seated vs standing  Calf stretch at wall 3 x 30 secs  gastroc stretch with belt 3 x  30 secs  Soleus stretch with belt 3 x 30 secs  Plantar fascia stretch 3 x 30 secs    Lacrosse ball roll over plantar surface x 2 min anterior to posterior and then medial to lateral across Metatarsal near MTP     Gerda received the following manual therapy techniques: Joint mobilizations, Manual traction, Myofacial release and Soft tissue Mobilization were applied to the: gastrocnemius, adductor lucas, achilles tendon for 00 minutes, including:  PA and AP individual metatarsal to metatarsal.   Long axis traction into DF  Short axis traction with hands just distal to knee.     PATIENT EDUCATION AND HOME EXERCISES     Home Exercises Provided and Patient Education Provided     Education provided:   - HEP, pain management, mechanism of injury     Written Home Exercises Provided: Patient instructed to cont prior HEP.  Exercises were reviewed and Gerda was able to demonstrate them prior to the end of the session.  Gerda demonstrated good  understanding of the education provided.      See EMR under Patient Instructions for exercises provided 7/11/2022.    ASSESSMENT     Gerda with good tolerance to today's treatment. She has been partially compliant with HEP so we reviewed all exercises today and she performed them with no issues or complaints of increased pain. Few verbal cues required for proper form and muscle engagement. Continue to monitor and progress as tolerated.     Gerda Is progressing well towards her goals.   Pt prognosis is Good.     Pt will continue to benefit from skilled outpatient physical therapy to address the deficits listed in the problem list box on initial evaluation, provide pt/family education and to maximize pt's level of independence in the home and community environment.     Pt's spiritual, cultural and educational needs considered and pt agreeable to plan of care and goals.     Anticipated barriers to physical therapy: none    Goals:   STG 3 weeks  1.  Pt to be independent with HEP for  increased functional mobility and pain control. Progressing 7/26/22  2.  Pt to increase AROM at R ankle DF 15 degs for increased functional mobility and transfers.  3.  Pt to decrease pain to less than 2/10 after session for increased functional mobility. Progressing 7/26/22     Long Term Goals: 8 weeks   1.  Pt to be independent with pain management strategies and verbalize 2 strategies for increased functional mobility and pain control.  2.  Pt to increase AROM at R knee range 0-120 degs for increased functional mobility and transfers.  3.  Pt to decrease pain to less than 0/10 after session for increased functional mobility.  4.  Pt to increase standing tolerance to 45 minutes cooking or shopping to increased overall IADLs without increased pain.   5. Pt demonstrate proper heel strike, no valgus moment on loading and proper pushoff without excessive supination on RLE for increased efficiency with gait      PLAN     Cont with IRENE Arcos, PTA

## 2022-08-09 ENCOUNTER — CLINICAL SUPPORT (OUTPATIENT)
Dept: REHABILITATION | Facility: HOSPITAL | Age: 87
End: 2022-08-09
Attending: ORTHOPAEDIC SURGERY
Payer: MEDICARE

## 2022-08-09 DIAGNOSIS — M79.671 RIGHT FOOT PAIN: ICD-10-CM

## 2022-08-09 DIAGNOSIS — R29.898 WEAKNESS OF BOTH LOWER EXTREMITIES: ICD-10-CM

## 2022-08-09 DIAGNOSIS — M79.604 ACUTE PAIN OF RIGHT LOWER EXTREMITY: Primary | ICD-10-CM

## 2022-08-09 PROCEDURE — 97110 THERAPEUTIC EXERCISES: CPT | Mod: PO

## 2022-08-09 NOTE — PROGRESS NOTES
[facility]   Physical Therapy Treatment Note     Name: Gerda Lai  Clinic Number: [id number]    Therapy Diagnosis:   Encounter Diagnoses   Name Primary?   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  Response to previous treatment: more days pain free  Functional change: walking with ease and good speed    Pain: 0/10 pre and post session  Location: right ankles and feet      OBJECTIVE     Objective Measures updated at progress report unless specified.       TREATMENT     Total Treatment time (time-based codes) separate from Evaluation: 40 minutes     Gerda received the treatments listed below:    Gerda received therapeutic exercises to develop strength, endurance, ROM and flexibility for 40 minutes including:  One set of each today to review HEP     Day 1 exercises:    Bridges with add ball 3 x 10  Bridges with abd GTB 3 x 10  Supine clams GTB 3 x 10    Hip add/frog stretch 3 x 30 secs  Trunk rotation stretch 3 x 30 secs  SKTC 3 x 30 secs    Quad set with towel under knee 2 x 10 with 2 sec hold  Glut set 2 x 10 with 2 sec hold  SLR 3 x 7     Day 2 exercises:  Seated clams vs supine or sidelying with GTB 3 x 10     Heel raises x 30 seated vs standing  Calf  stretch at wall 3 x 30 secs  gastroc stretch with belt 3 x 30 secs  Soleus stretch with belt 3 x 30 secs  Plantar fascia stretch 3 x 30 secs    Lacrosse ball roll over plantar surface x 2 min anterior to posterior and then medial to lateral across Metatarsal near MTP  +Seated DF/PF and then inversion/eversion over half foam x 1 min each    +nu Step x 5 min level 1.0     Gerda received the following manual therapy techniques: Joint mobilizations, Manual traction, Myofacial release and Soft tissue Mobilization were applied to the: gastrocnemius, adductor lucas, achilles tendon for 00 minutes, including:  PA and AP individual metatarsal to metatarsal.   Long axis traction into DF  Short axis traction with hands just distal to knee.     PATIENT EDUCATION AND HOME EXERCISES     Home Exercises Provided and Patient Education Provided     Education provided:   - HEP, pain management, mechanism of injury     Written Home Exercises Provided: Patient instructed to cont prior HEP.  Exercises were reviewed and Gerda was able to demonstrate them prior to the end of the session.  Gerda demonstrated good  understanding of the education provided.      See EMR under Patient Instructions for exercises provided 7/11/2022.    ASSESSMENT     Gerda with good tolerance to today's treatment with emphasis on gastrocnemius thus foot ankle and knee. R knee continues to be limiting factor with rehab and patient has come to self realization that she may need further surgical intervention in necessary with her ortho team. Pt had good relief with gastrocnemius pain or tightness.     eGrda Is progressing well towards her goals.   Pt prognosis is Good.     Pt will continue to benefit from skilled outpatient physical therapy to address the deficits listed in the problem list box on initial evaluation, provide pt/family education and to maximize pt's level of independence in the home and community environment.     Pt's spiritual, cultural and  educational needs considered and pt agreeable to plan of care and goals.     Anticipated barriers to physical therapy: none    Goals:   STG 3 weeks  1.  Pt to be independent with HEP for increased functional mobility and pain control. Progressing 8/9/22  2.  Pt to increase AROM at R ankle DF 15 degs for increased functional mobility and transfers.  3.  Pt to decrease pain to less than 2/10 after session for increased functional mobility. MET 8/9/22     Long Term Goals: 8 weeks   1.  Pt to be independent with pain management strategies and verbalize 2 strategies for increased functional mobility and pain control.  2.  Pt to increase AROM at R knee range 0-120 degs for increased functional mobility and transfers.  3.  Pt to decrease pain to less than 0/10 after session for increased functional mobility. Progressing 8/9/22  4.  Pt to increase standing tolerance to 45 minutes cooking or shopping to increased overall IADLs without increased pain.   5. Pt demonstrate proper heel strike, no valgus moment on loading and proper pushoff without excessive supination on RLE for increased efficiency with gait      PLAN     Cont with POC    Jordana Siegel, PT

## 2022-08-10 LAB
FINAL PATHOLOGIC DIAGNOSIS: NORMAL
GROSS: NORMAL
Lab: NORMAL
MICROSCOPIC EXAM: NORMAL

## 2022-08-10 NOTE — PROGRESS NOTES
Post- operative :squamous cell carcinoma- margins clear  F/u 6 months for skin check    . Skin, right upper arm, excision:   - RESIDUAL INVASIVE SQUAMOUS CELL CARCINOMA.   - MARGINS ARE NEGATIVE.   - PREVIOUS BIOPSY SITE CHANGES.

## 2022-08-12 ENCOUNTER — PATIENT MESSAGE (OUTPATIENT)
Dept: DERMATOLOGY | Facility: CLINIC | Age: 87
End: 2022-08-12
Payer: MEDICARE

## 2022-08-12 ENCOUNTER — PATIENT MESSAGE (OUTPATIENT)
Dept: CARDIOLOGY | Facility: CLINIC | Age: 87
End: 2022-08-12
Payer: MEDICARE

## 2022-08-13 DIAGNOSIS — T50.901A ACCIDENTAL OVERDOSE, INITIAL ENCOUNTER: Primary | ICD-10-CM

## 2022-08-13 NOTE — PROGRESS NOTES
Ms. Lai misunderstood the instructions and continued to double the pravastatin after picking up the new higher dose.  Will get hepatic panel to evaluate liver enzymes before resuming normal dosing.

## 2022-08-15 ENCOUNTER — LAB VISIT (OUTPATIENT)
Dept: LAB | Facility: HOSPITAL | Age: 87
End: 2022-08-15
Attending: NURSE PRACTITIONER
Payer: MEDICARE

## 2022-08-15 ENCOUNTER — TELEPHONE (OUTPATIENT)
Dept: CARDIOLOGY | Facility: CLINIC | Age: 87
End: 2022-08-15
Payer: MEDICARE

## 2022-08-15 ENCOUNTER — CLINICAL SUPPORT (OUTPATIENT)
Dept: DERMATOLOGY | Facility: CLINIC | Age: 87
End: 2022-08-15
Payer: MEDICARE

## 2022-08-15 DIAGNOSIS — Z48.02 VISIT FOR SUTURE REMOVAL: Primary | ICD-10-CM

## 2022-08-15 DIAGNOSIS — T50.901A ACCIDENTAL OVERDOSE, INITIAL ENCOUNTER: ICD-10-CM

## 2022-08-15 PROCEDURE — 80076 HEPATIC FUNCTION PANEL: CPT | Performed by: NURSE PRACTITIONER

## 2022-08-15 PROCEDURE — 36415 COLL VENOUS BLD VENIPUNCTURE: CPT | Mod: PO | Performed by: NURSE PRACTITIONER

## 2022-08-15 NOTE — TELEPHONE ENCOUNTER
----- Message from Nikole Stubbs NP sent at 8/13/2022  9:15 AM CDT -----  Regarding: hepatic panel  Hi Tamica,    She accidentally over dosed herself on pravastatin.  I'd like to get a hepatic panel to make sure her liver enzymes are ok before resuming therapy.  Could you please schedule this for early next week?  Thanks,  Nikole

## 2022-08-15 NOTE — PROGRESS NOTES
Suture Removal note:  CC: 89 y.o. female patient is here for suture removal.         HPI: Patient is s/p excision of SCC check margins  from the Right upper arm on 08/01/2022.  Patient reports no problems.    WOUND PE:  Sutures intact.  Wound healing well.  Good approximation of skin edges.  No signs or symptoms of infection.    IMPRESSION:  squamous cell carcinoma - margins clear.    PLAN:  Sutures removed today.  Continue wound care.    RTC: In 6 months.

## 2022-08-16 ENCOUNTER — TELEPHONE (OUTPATIENT)
Dept: CARDIOLOGY | Facility: CLINIC | Age: 87
End: 2022-08-16
Payer: MEDICARE

## 2022-08-16 ENCOUNTER — CLINICAL SUPPORT (OUTPATIENT)
Dept: REHABILITATION | Facility: HOSPITAL | Age: 87
End: 2022-08-16
Payer: MEDICARE

## 2022-08-16 DIAGNOSIS — M79.671 RIGHT FOOT PAIN: ICD-10-CM

## 2022-08-16 DIAGNOSIS — M79.604 ACUTE PAIN OF RIGHT LOWER EXTREMITY: Primary | ICD-10-CM

## 2022-08-16 DIAGNOSIS — R29.898 WEAKNESS OF BOTH LOWER EXTREMITIES: ICD-10-CM

## 2022-08-16 LAB
ALBUMIN SERPL BCP-MCNC: 4.2 G/DL (ref 3.5–5.2)
ALP SERPL-CCNC: 78 U/L (ref 55–135)
ALT SERPL W/O P-5'-P-CCNC: 15 U/L (ref 10–44)
AST SERPL-CCNC: 19 U/L (ref 10–40)
BILIRUB DIRECT SERPL-MCNC: 0.3 MG/DL (ref 0.1–0.3)
BILIRUB SERPL-MCNC: 0.8 MG/DL (ref 0.1–1)
PROT SERPL-MCNC: 6.3 G/DL (ref 6–8.4)

## 2022-08-16 PROCEDURE — 97110 THERAPEUTIC EXERCISES: CPT | Mod: PO,CQ

## 2022-08-16 NOTE — TELEPHONE ENCOUNTER
----- Message from Nikole Stubbs NP sent at 8/16/2022  6:50 AM CDT -----  Please let her know that her liver enzymes look good, normal.  She can resume the pravastatin at 80 mg daily. Thanks!

## 2022-08-16 NOTE — PROGRESS NOTES
[facility]   Physical Therapy Treatment Note     Name: Gerda Lai  Clinic Number: [id number]    Therapy Diagnosis:   Encounter Diagnoses   Name Primary?   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 She has no pain.    She was compliant with home exercise program.  Response to previous treatment: more days pain free  Functional change: walking with ease and good speed    Pain: 0/10 pre and post session  Location: right ankles and feet      OBJECTIVE     Objective Measures updated at progress report unless specified.       TREATMENT     Total Treatment time (time-based codes) separate from Evaluation: 38 minutes     Gerda received the treatments listed below:    Gerda received therapeutic exercises to develop strength, endurance, ROM and flexibility for 38 minutes including:  One set of each today to review HEP     Day 1 exercises:    Bridges with add ball 3 x 10  Bridges with abd GTB 3 x 10  Supine clams GTB 3 x 10    Hip add/frog stretch 3 x 30 secs  Trunk rotation stretch 3 x 30 secs  SKTC 3 x 30 secs    Quad set with towel under knee 2 x 10 with 2 sec hold  Glut set 2 x 10 with 2 sec hold  SLR 3 x 7     Day 2 exercises:  Seated clams vs supine or sidelying with GTB 3 x 10     Heel raises x 30  seated vs standing  Calf stretch at wall 3 x 30 secs vs on slant board  gastroc stretch with belt 3 x 30 secs  Soleus stretch with belt 3 x 30 secs  Plantar fascia stretch 3 x 30 secs    Lacrosse ball roll over plantar surface x 2 min anterior to posterior and then medial to lateral across Metatarsal near MTP  Seated DF/PF and then inversion/eversion over half foam x 1 min each +YTB    NuStep x 6 min level 1.2     Gerda received the following manual therapy techniques: Joint mobilizations, Manual traction, Myofacial release and Soft tissue Mobilization were applied to the: gastrocnemius, adductor lucas, achilles tendon for 00 minutes, including:  PA and AP individual metatarsal to metatarsal.   Long axis traction into DF  Short axis traction with hands just distal to knee.     PATIENT EDUCATION AND HOME EXERCISES     Home Exercises Provided and Patient Education Provided     Education provided:   - HEP, pain management, mechanism of injury     Written Home Exercises Provided: Patient instructed to cont prior HEP.  Exercises were reviewed and Gerda was able to demonstrate them prior to the end of the session.  Gerda demonstrated good  understanding of the education provided.      See EMR under Patient Instructions for exercises provided 7/11/2022.    ASSESSMENT     Gerda with good tolerance to tx session today. She tolerated all exercises with no issues and no reports of increased pain. Treatment session with emphasis on gastrocnemius thus foot and ankle. Pt with good response and relief with gastrocnemius tightness. Continue to monitor and progress as tolerated.     Gerda Is progressing well towards her goals.   Pt prognosis is Good.     Pt will continue to benefit from skilled outpatient physical therapy to address the deficits listed in the problem list box on initial evaluation, provide pt/family education and to maximize pt's level of independence in the home and community environment.     Pt's spiritual,  cultural and educational needs considered and pt agreeable to plan of care and goals.     Anticipated barriers to physical therapy: none    Goals:   STG 3 weeks  1.  Pt to be independent with HEP for increased functional mobility and pain control. Progressing 8/9/22  2.  Pt to increase AROM at R ankle DF 15 degs for increased functional mobility and transfers.  3.  Pt to decrease pain to less than 2/10 after session for increased functional mobility. MET 8/9/22     Long Term Goals: 8 weeks   1.  Pt to be independent with pain management strategies and verbalize 2 strategies for increased functional mobility and pain control.  2.  Pt to increase AROM at R knee range 0-120 degs for increased functional mobility and transfers.  3.  Pt to decrease pain to less than 0/10 after session for increased functional mobility. Progressing 8/9/22  4.  Pt to increase standing tolerance to 45 minutes cooking or shopping to increased overall IADLs without increased pain.   5. Pt demonstrate proper heel strike, no valgus moment on loading and proper pushoff without excessive supination on RLE for increased efficiency with gait      PLAN     Cont with POC    Shannan Arcos, PTA

## 2022-08-23 ENCOUNTER — CLINICAL SUPPORT (OUTPATIENT)
Dept: REHABILITATION | Facility: HOSPITAL | Age: 87
End: 2022-08-23
Attending: ORTHOPAEDIC SURGERY
Payer: MEDICARE

## 2022-08-23 DIAGNOSIS — R29.898 WEAKNESS OF BOTH LOWER EXTREMITIES: ICD-10-CM

## 2022-08-23 DIAGNOSIS — M79.671 RIGHT FOOT PAIN: ICD-10-CM

## 2022-08-23 DIAGNOSIS — M79.604 ACUTE PAIN OF RIGHT LOWER EXTREMITY: Primary | ICD-10-CM

## 2022-08-23 PROCEDURE — 97140 MANUAL THERAPY 1/> REGIONS: CPT | Mod: PO

## 2022-08-23 PROCEDURE — 97110 THERAPEUTIC EXERCISES: CPT | Mod: PO

## 2022-08-23 NOTE — PROGRESS NOTES
[facility]   Physical Therapy Treatment Note     Name: Gerda Lai  Clinic Number: [id number]    Therapy Diagnosis:   Encounter Diagnoses   Name Primary?   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      She was compliant with home exercise program.  Response to previous treatment: more days pain free  Functional change: walking with ease and good speed    Pain: 0/10 pre and post session  Location: right ankles and feet      OBJECTIVE     Objective Measures updated at progress report unless specified.       TREATMENT     Total Treatment time (time-based codes) separate from Evaluation: 38 minutes     Gerda received the treatments listed below:    Gerda received therapeutic exercises to develop strength, endurance, ROM and flexibility for 38 minutes including:  One set of each today to review HEP     Day 1 exercises:    Seated EMOM 2 rounds  Seated clams with GTB x 1min  LAQS x 1 min  Seated heel raises x 1 min    supine EMOM 2 rounds  Bridges with abd GTB x 1 min  Supine clams GTB x 1 min  Trunk rotation x 1 min      Bridges with add ball 3 x 10  Bridges with abd GTB 3 x 10  Supine clams GTB 3 x 10    Hip add/frog stretch 3 x 30 secs  Trunk rotation stretch 3 x 30  secs  SKTC 3 x 30 secs    Quad set with towel under knee 2 x 10 with 2 sec hold  Glut set 2 x 10 with 2 sec hold  SLR 3 x 7     Day 2 exercises:  Seated clams vs supine or sidelying with GTB 3 x 10     Heel raises x 30 seated vs standing  Calf stretch at wall 3 x 30 secs vs on slant board  gastroc stretch with belt 3 x 30 secs  Soleus stretch with belt 3 x 30 secs  Plantar fascia stretch 3 x 30 secs    Lacrosse ball roll over plantar surface x 2 min anterior to posterior and then medial to lateral across Metatarsal near MTP  Seated DF/PF and then inversion/eversion over half foam x 1 min each +YTB    NuStep x 8 min level 1.2     Gerda received the following manual therapy techniques: Joint mobilizations, Manual traction, Myofacial release and Soft tissue Mobilization were applied to the: gastrocnemius, adductor lucas, achilles tendon for 00 minutes, including:  PA and AP individual metatarsal to metatarsal.   Long axis traction into DF  Short axis traction with hands just distal to knee.     PATIENT EDUCATION AND HOME EXERCISES     Home Exercises Provided and Patient Education Provided     Education provided:   - HEP, pain management, mechanism of injury     Written Home Exercises Provided: Patient instructed to cont prior HEP.  Exercises were reviewed and Gerda was able to demonstrate them prior to the end of the session.  Gerda demonstrated good  understanding of the education provided.      See EMR under Patient Instructions for exercises provided 7/11/2022.    ASSESSMENT     Gerda with good tolerance to tx session today and likes the convenience and simplicity with exercises in groups with increased tolerance with less rest breaks.     Gerda Is progressing well towards her goals.   Pt prognosis is Good.     Pt will continue to benefit from skilled outpatient physical therapy to address the deficits listed in the problem list box on initial evaluation, provide pt/family education and to maximize pt's level  of independence in the home and community environment.     Pt's spiritual, cultural and educational needs considered and pt agreeable to plan of care and goals.     Anticipated barriers to physical therapy: none    Goals:   STG 3 weeks  1.  Pt to be independent with HEP for increased functional mobility and pain control. Progressing 8/9/22  2.  Pt to increase AROM at R ankle DF 15 degs for increased functional mobility and transfers.  3.  Pt to decrease pain to less than 2/10 after session for increased functional mobility. MET 8/9/22     Long Term Goals: 8 weeks   1.  Pt to be independent with pain management strategies and verbalize 2 strategies for increased functional mobility and pain control.  2.  Pt to increase AROM at R knee range 0-120 degs for increased functional mobility and transfers.  3.  Pt to decrease pain to less than 0/10 after session for increased functional mobility. Progressing 8/9/22  4.  Pt to increase standing tolerance to 45 minutes cooking or shopping to increased overall IADLs without increased pain.   5. Pt demonstrate proper heel strike, no valgus moment on loading and proper pushoff without excessive supination on RLE for increased efficiency with gait      PLAN     Cont with POC    Jordana Siegel, PT

## 2022-08-30 ENCOUNTER — CLINICAL SUPPORT (OUTPATIENT)
Dept: REHABILITATION | Facility: HOSPITAL | Age: 87
End: 2022-08-30
Attending: ORTHOPAEDIC SURGERY
Payer: MEDICARE

## 2022-08-30 DIAGNOSIS — M79.604 ACUTE PAIN OF RIGHT LOWER EXTREMITY: Primary | ICD-10-CM

## 2022-08-30 DIAGNOSIS — R29.898 WEAKNESS OF BOTH LOWER EXTREMITIES: ICD-10-CM

## 2022-08-30 DIAGNOSIS — M79.671 RIGHT FOOT PAIN: ICD-10-CM

## 2022-08-30 PROCEDURE — 97110 THERAPEUTIC EXERCISES: CPT | Mod: PO

## 2022-08-30 PROCEDURE — 97140 MANUAL THERAPY 1/> REGIONS: CPT | Mod: PO

## 2022-08-30 NOTE — PROGRESS NOTES
[facility]   Physical Therapy Treatment Note     Name: Gerda Lai  Clinic Number: [id number]    Therapy Diagnosis:   Encounter Diagnoses   Name Primary?    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     She was compliant with home exercise program.  Response to previous treatment: more days pain free  Functional change: walking with ease and good speed    Pain: 0/10 pre and post session;  R knee 3/10   Location: right ankles and feet      OBJECTIVE     Objective Measures updated at progress report unless specified.       TREATMENT     Total Treatment time (time-based codes) separate from Evaluation: 40 minutes     Gerda received the treatments listed below:    Gerda received therapeutic exercises to develop strength, endurance, ROM and flexibility for 30 minutes including:  One set of each today to review HEP     Day 1 exercises:    Seated EMOM 2 rounds  Seated clams with GTB x 1min  Seated adduction x 3 sec hold total 1 min  LAQS x 1 min  Seated heel raises bilaterally x 1 min  Seated toe taps reciprocal unilateral x 1 min      supine EMOM 2 rounds  SAQs into SLR  x 15  Bridges with abd GTB  x 1 min  Supine clams GTB x 1 min  Trunk rotation x 1 min      Bridges with add ball 3 x 10  Bridges with abd GTB 3 x 10  Supine clams GTB 3 x 10    Hip add/frog stretch 3 x 30 secs  Trunk rotation stretch 3 x 30 secs  SKTC 3 x 30 secs    Quad set with towel under knee 2 x 10 with 2 sec hold  Glut set 2 x 10 with 2 sec hold  SLR 3 x 7     Day 2 exercises:  Seated clams vs supine or sidelying with GTB 3 x 10     Heel raises x 30 seated vs standing  Calf stretch at wall 3 x 30 secs vs on slant board  gastroc stretch with belt 3 x 30 secs  Soleus stretch with belt 3 x 30 secs  Plantar fascia stretch 3 x 30 secs    Lacrosse ball roll over plantar surface x 2 min anterior to posterior and then medial to lateral across Metatarsal near MTP  Seated DF/PF and then inversion/eversion over half foam x 1 min each +YTB    NuStep x 5 min level 1.2     Gerda received the following manual therapy techniques: Joint mobilizations, Manual traction, Myofacial release and Soft tissue Mobilization were applied to the: gastrocnemius, quadriceps adductor lucas, achilles tendon for 10 minutes, including:  PA and AP individual metatarsal to metatarsal.   Long axis traction into DF  Short axis traction with hands just distal to knee.     PATIENT EDUCATION AND HOME EXERCISES     Home Exercises Provided and Patient Education Provided     Education provided:   - HEP, pain management, mechanism of injury     Written Home Exercises Provided: Patient instructed to cont prior HEP.  Exercises were reviewed and Gerda was able to demonstrate them prior to the end of the session.  Gerda demonstrated good  understanding of the education provided.      See EMR under Patient Instructions for exercises provided 7/11/2022.    ASSESSMENT     Gerda with good tolerance to tx session today and likes the convenience and simplicity with exercises in groups with increased tolerance with less rest breaks even with increased workload with one additional exercise  per group with signs of fatigue at end of session.     Gerda Is progressing well towards her goals.   Pt prognosis is Good.     Pt will continue to benefit from skilled outpatient physical therapy to address the deficits listed in the problem list box on initial evaluation, provide pt/family education and to maximize pt's level of independence in the home and community environment.     Pt's spiritual, cultural and educational needs considered and pt agreeable to plan of care and goals.     Anticipated barriers to physical therapy: none    Goals:   STG 3 weeks  1.  Pt to be independent with HEP for increased functional mobility and pain control. Progressing 8/9/22  2.  Pt to increase AROM at R ankle DF 15 degs for increased functional mobility and transfers.  3.  Pt to decrease pain to less than 2/10 after session for increased functional mobility. MET 8/9/22     Long Term Goals: 8 weeks   1.  Pt to be independent with pain management strategies and verbalize 2 strategies for increased functional mobility and pain control.  2.  Pt to increase AROM at R knee range 0-120 degs for increased functional mobility and transfers.  3.  Pt to decrease pain to less than 0/10 after session for increased functional mobility. MET 8/30/22  4.  Pt to increase standing tolerance to 45 minutes cooking or shopping to increased overall IADLs without increased pain.   5. Pt demonstrate proper heel strike, no valgus moment on loading and proper pushoff without excessive supination on RLE for increased efficiency with gait      PLAN     Cont with IRENE Siegel, PT

## 2022-09-06 ENCOUNTER — CLINICAL SUPPORT (OUTPATIENT)
Dept: REHABILITATION | Facility: HOSPITAL | Age: 87
End: 2022-09-06
Attending: ORTHOPAEDIC SURGERY
Payer: MEDICARE

## 2022-09-06 DIAGNOSIS — M79.671 RIGHT FOOT PAIN: ICD-10-CM

## 2022-09-06 DIAGNOSIS — R29.898 WEAKNESS OF BOTH LOWER EXTREMITIES: ICD-10-CM

## 2022-09-06 DIAGNOSIS — M79.604 ACUTE PAIN OF RIGHT LOWER EXTREMITY: Primary | ICD-10-CM

## 2022-09-06 PROCEDURE — 97110 THERAPEUTIC EXERCISES: CPT | Mod: PO

## 2022-09-06 PROCEDURE — 97140 MANUAL THERAPY 1/> REGIONS: CPT | Mod: PO

## 2022-09-06 NOTE — PROGRESS NOTES
[facility]   Physical Therapy Treatment Note     Name: Gerda Lai  Clinic Number: [id number]    Therapy Diagnosis:   Encounter Diagnoses   Name Primary?    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      She was compliant with home exercise program.  Response to previous treatment: more days pain free  Functional change: walking with ease and good speed    Pain: 0/10 pre and post session;  R knee 1-2/10   Location: right ankles and feet      OBJECTIVE     Objective Measures updated at progress report unless specified.       TREATMENT     Total Treatment time (time-based codes) separate from Evaluation: 38 minutes     Gerda received the treatments listed below:    Gerda received therapeutic exercises to develop strength, endurance, ROM and flexibility for 35 minutes including:       Day 1 exercises:    Seated EMOM 2 rounds    Seated clams with GTB x 1min  Seated adduction x 3 sec hold total 1 min  LAQS x 1 min  Seated heel raises bilaterally x 1 min  Seated toe taps reciprocal unilateral x 1 min      supine EMOM 2 rounds  SAQs into SLR  x 15  Bridges with abd GTB x 1 min  Supine clams GTB x 1 min  Trunk rotation x 1 min      Bridges with add ball 3 x 10  Bridges with abd GTB 3 x  10  Supine clams GTB 3 x 10    Hip add/frog stretch 3 x 30 secs  Trunk rotation stretch 3 x 30 secs  SKTC 3 x 30 secs    Quad set with towel under knee 2 x 10 with 2 sec hold  Glut set 2 x 10 with 2 sec hold  SLR 3 x 7     Day 2 exercises:  Seated clams vs supine or sidelying with GTB 3 x 10     Heel raises x 30 seated vs standing  Calf stretch at wall 3 x 30 secs vs on slant board  gastroc stretch with belt 3 x 30 secs  Soleus stretch with belt 3 x 30 secs  Plantar fascia stretch 3 x 30 secs    Lacrosse ball roll over plantar surface x 2 min anterior to posterior and then medial to lateral across Metatarsal near MTP  Seated DF/PF and then inversion/eversion over half foam x 1 min each +YTB    NuStep x 5 min level 1.2     Gerda received the following manual therapy techniques: Joint mobilizations, Manual traction, Myofacial release and Soft tissue Mobilization were applied to the: gastrocnemius, quadriceps adductor lucas, achilles tendon for 8 minutes, including:  PA and AP individual metatarsal to metatarsal.   Long axis traction into DF  Short axis traction with hands just distal to knee.     PATIENT EDUCATION AND HOME EXERCISES     Home Exercises Provided and Patient Education Provided     Education provided:   - HEP, pain management, mechanism of injury     Written Home Exercises Provided: Patient instructed to cont prior HEP.  Exercises were reviewed and Gerda was able to demonstrate them prior to the end of the session.  Gerda demonstrated good  understanding of the education provided.      See EMR under Patient Instructions for exercises provided 7/11/2022.    ASSESSMENT     Gerda with good tolerance to tx session today with [facility] groups and good active DF + 20 deg noted during exercise and overall pain well controlled with increased warm up in bed in supine before OOB in AM.    Gerda Is progressing well towards her goals.   Pt prognosis is Good.     Pt will continue to benefit from skilled outpatient  physical therapy to address the deficits listed in the problem list box on initial evaluation, provide pt/family education and to maximize pt's level of independence in the home and community environment.     Pt's spiritual, cultural and educational needs considered and pt agreeable to plan of care and goals.     Anticipated barriers to physical therapy: none    Goals:   STG 3 weeks  1.  Pt to be independent with HEP for increased functional mobility and pain control. MET 9/6/22  2.  Pt to increase AROM at R ankle DF 15 degs for increased functional mobility and transfers.MET 9/6/22  3.  Pt to decrease pain to less than 2/10 after session for increased functional mobility. MET 8/9/22     Long Term Goals: 8 weeks   1.  Pt to be independent with pain management strategies and verbalize 2 strategies for increased functional mobility and pain control.  2.  Pt to increase AROM at R knee range 0-120 degs for increased functional mobility and transfers.  3.  Pt to decrease pain to less than 0/10 after session for increased functional mobility. MET 8/30/22  4.  Pt to increase standing tolerance to 45 minutes cooking or shopping to increased overall IADLs without increased pain.   5. Pt demonstrate proper heel strike, no valgus moment on loading and proper pushoff without excessive supination on RLE for increased efficiency with gait      PLAN     Cont with POC    Jordana Siegel, PT

## 2022-09-13 ENCOUNTER — CLINICAL SUPPORT (OUTPATIENT)
Dept: AUDIOLOGY | Facility: CLINIC | Age: 87
End: 2022-09-13
Payer: MEDICARE

## 2022-09-13 DIAGNOSIS — H90.3 SENSORINEURAL HEARING LOSS, BILATERAL: Primary | ICD-10-CM

## 2022-09-13 NOTE — PROGRESS NOTES
Gerda Lai, a 89 y.o. female, was seen today for a hearing aid consultation.  We discussed the patients hearing loss and its effects on her daily life in detail.  Pt stated the hearing loss makes it difficult to hear in noisy situations.  She stated that she has worn hearing aids, purchased elsewhere, for several years.  She feels that she notes a difference when wearing them, but she has never been impressed with their performance.  Alternate hearing aid options were presented and BTE aids were recommended.  Pt selected a set of advanced level aids.  A hearing aid order form was completed and signed.  Patient acknowledged understanding of the 30 day trial period, $250 restocking fee from the time of order, and the fact that we do not file insurance on behalf of the patient.  Patient will return for a hearing aid evaluation in one month.

## 2022-09-15 ENCOUNTER — LAB VISIT (OUTPATIENT)
Dept: LAB | Facility: HOSPITAL | Age: 87
End: 2022-09-15
Attending: NURSE PRACTITIONER
Payer: MEDICARE

## 2022-09-15 DIAGNOSIS — E78.5 HYPERLIPIDEMIA, UNSPECIFIED HYPERLIPIDEMIA TYPE: Chronic | ICD-10-CM

## 2022-09-15 LAB
ALBUMIN SERPL BCP-MCNC: 4.2 G/DL (ref 3.5–5.2)
ALP SERPL-CCNC: 66 U/L (ref 55–135)
ALT SERPL W/O P-5'-P-CCNC: 18 U/L (ref 10–44)
ANION GAP SERPL CALC-SCNC: 6 MMOL/L (ref 8–16)
AST SERPL-CCNC: 24 U/L (ref 10–40)
BILIRUB SERPL-MCNC: 1.1 MG/DL (ref 0.1–1)
BUN SERPL-MCNC: 28 MG/DL (ref 8–23)
CALCIUM SERPL-MCNC: 9.8 MG/DL (ref 8.7–10.5)
CHLORIDE SERPL-SCNC: 109 MMOL/L (ref 95–110)
CHOLEST SERPL-MCNC: 174 MG/DL (ref 120–199)
CHOLEST/HDLC SERPL: 3.3 {RATIO} (ref 2–5)
CO2 SERPL-SCNC: 23 MMOL/L (ref 23–29)
CREAT SERPL-MCNC: 1.2 MG/DL (ref 0.5–1.4)
EST. GFR  (NO RACE VARIABLE): 43.3 ML/MIN/1.73 M^2
GLUCOSE SERPL-MCNC: 86 MG/DL (ref 70–110)
HDLC SERPL-MCNC: 53 MG/DL (ref 40–75)
HDLC SERPL: 30.5 % (ref 20–50)
LDLC SERPL CALC-MCNC: 86.2 MG/DL (ref 63–159)
NONHDLC SERPL-MCNC: 121 MG/DL
POTASSIUM SERPL-SCNC: 4.3 MMOL/L (ref 3.5–5.1)
PROT SERPL-MCNC: 6.9 G/DL (ref 6–8.4)
SODIUM SERPL-SCNC: 138 MMOL/L (ref 136–145)
TRIGL SERPL-MCNC: 174 MG/DL (ref 30–150)

## 2022-09-15 PROCEDURE — 80053 COMPREHEN METABOLIC PANEL: CPT | Performed by: NURSE PRACTITIONER

## 2022-09-15 PROCEDURE — 36415 COLL VENOUS BLD VENIPUNCTURE: CPT | Mod: PO | Performed by: NURSE PRACTITIONER

## 2022-09-15 PROCEDURE — 80061 LIPID PANEL: CPT | Performed by: NURSE PRACTITIONER

## 2022-09-20 ENCOUNTER — CLINICAL SUPPORT (OUTPATIENT)
Dept: REHABILITATION | Facility: HOSPITAL | Age: 87
End: 2022-09-20
Attending: INTERNAL MEDICINE
Payer: MEDICARE

## 2022-09-20 DIAGNOSIS — M79.604 ACUTE PAIN OF RIGHT LOWER EXTREMITY: Primary | ICD-10-CM

## 2022-09-20 DIAGNOSIS — M79.671 RIGHT FOOT PAIN: ICD-10-CM

## 2022-09-20 DIAGNOSIS — R29.898 WEAKNESS OF BOTH LOWER EXTREMITIES: ICD-10-CM

## 2022-09-20 PROCEDURE — 97110 THERAPEUTIC EXERCISES: CPT | Mod: PO

## 2022-09-20 NOTE — PROGRESS NOTES
[facility]   Physical Therapy Treatment Note     Name: Gerda Lai  Clinic Number: [id number]    Therapy Diagnosis:   Encounter Diagnoses   Name Primary?    Acute pain of right lower extremity Yes    Right foot pain     Weakness of both lower extremities        Physician: Cesar Segundo MD    Visit Date: 9/20/2022    Physician Orders: PT eval and treat  Medical Diagnosis:   M77.41 (ICD-10-CM) - Metatarsalgia, right foot   M67.00 (ICD-10-CM) - Short Achilles tendon (acquired), unspecified ankle         Evaluation Date: 7/11/22  Authorization Period Expiration: 12/31/22  Plan of Care Certification Period: 9/30/22  Progress Note Due: 8/11/22    Visit # / Visits authorized: 6/20  FOTO: 1/ 3   PTA Visit #: 1/5       Precautions: Standard    Time In: 1:49  pm   Time Out: 2:27 pm  Total Billable Time: 38 minutes TE 3      SUBJECTIVE     Pt reports: no issues overall and feels better with exercise and no major issues but mild dizziness and fatigue this morning.      She was compliant with home exercise program.  Response to previous treatment: more days pain free  Functional change: walking with ease and good speed    Pain: 0/10 pre and post session;  R knee 1-2/10   Location: right ankles and feet      OBJECTIVE     Objective Measures updated at progress report unless specified.       TREATMENT     Total Treatment time (time-based codes) separate from Evaluation: 38 minutes     Gerda received the treatments listed below:    Gerda received therapeutic exercises to develop strength, endurance, ROM and flexibility for 38 minutes including:       Day 1 exercises:    Seated EMOM 2 rounds    Seated clams with GTB x 1min  Seated adduction x 3 sec hold total 1 min  LAQS x 1 min  Seated heel raises bilaterally x 1 min  Seated toe taps reciprocal unilateral x 1 min      supine EMOM 2 rounds  SAQs into SLR  x 15  Bridges with abd GTB x 1 min  Supine clams GTB x 1 min  Trunk rotation x 1  min      Bridges with add ball 3 x 10  Bridges with abd GTB 3 x 10  Supine clams GTB 3 x 10    Hip add/frog stretch 3 x 30 secs  Trunk rotation stretch 3 x 30 secs  SKTC 3 x 30 secs    Quad set with towel under knee 2 x 10 with 2 sec hold  Glut set 2 x 10 with 2 sec hold  SLR 3 x 7     Day 2 exercises:  Seated clams vs supine or sidelying with GTB 3 x 10     Heel raises x 30 seated vs standing  Calf stretch at wall 3 x 30 secs vs on slant board  gastroc stretch with belt 3 x 30 secs  Soleus stretch with belt 3 x 30 secs  Plantar fascia stretch 3 x 30 secs    Lacrosse ball roll over plantar surface x 2 min anterior to posterior and then medial to lateral across Metatarsal near MTP  Seated DF/PF and then inversion/eversion over half foam x 1 min each +YTB    NuStep x 5 min level 1.2     Gerda received the following manual therapy techniques: Joint mobilizations, Manual traction, Myofacial release and Soft tissue Mobilization were applied to the: gastrocnemius, quadriceps adductor lucas, achilles tendon for 0 minutes, including:  PA and AP individual metatarsal to metatarsal.   Long axis traction into DF  Short axis traction with hands just distal to knee.     PATIENT EDUCATION AND HOME EXERCISES     Home Exercises Provided and Patient Education Provided     Education provided:   - HEP, pain management, mechanism of injury     Written Home Exercises Provided: Patient instructed to cont prior HEP.  Exercises were reviewed and Gerda was able to demonstrate them prior to the end of the session.  Gerda demonstrated good  understanding of the education provided.      See EMR under Patient Instructions for exercises provided 7/11/2022.    ASSESSMENT     Gerda with good tolerance overall tolerance but limited with mild dizziness and fatigue with mild changes in medication and normal reaction that her MD is aware of and will address this week.     Gerda Is progressing well towards her goals.   Pt prognosis is Good.      Pt will continue to benefit from skilled outpatient physical therapy to address the deficits listed in the problem list box on initial evaluation, provide pt/family education and to maximize pt's level of independence in the home and community environment.     Pt's spiritual, cultural and educational needs considered and pt agreeable to plan of care and goals.     Anticipated barriers to physical therapy: none    Goals:   STG 3 weeks  1.  Pt to be independent with HEP for increased functional mobility and pain control. MET 9/6/22  2.  Pt to increase AROM at R ankle DF 15 degs for increased functional mobility and transfers.MET 9/6/22  3.  Pt to decrease pain to less than 2/10 after session for increased functional mobility. MET 8/9/22     Long Term Goals: 8 weeks   1.  Pt to be independent with pain management strategies and verbalize 2 strategies for increased functional mobility and pain control.  2.  Pt to increase AROM at R knee range 0-120 degs for increased functional mobility and transfers.  3.  Pt to decrease pain to less than 0/10 after session for increased functional mobility. MET 8/30/22  4.  Pt to increase standing tolerance to 45 minutes cooking or shopping to increased overall IADLs without increased pain.   5. Pt demonstrate proper heel strike, no valgus moment on loading and proper pushoff without excessive supination on RLE for increased efficiency with gait      PLAN     Cont with POC    Jordana Siegel, PT

## 2022-09-22 ENCOUNTER — OFFICE VISIT (OUTPATIENT)
Dept: CARDIOLOGY | Facility: CLINIC | Age: 87
End: 2022-09-22
Payer: MEDICARE

## 2022-09-22 ENCOUNTER — TELEPHONE (OUTPATIENT)
Dept: OPTOMETRY | Facility: CLINIC | Age: 87
End: 2022-09-22
Payer: MEDICARE

## 2022-09-22 ENCOUNTER — PATIENT MESSAGE (OUTPATIENT)
Dept: OPTOMETRY | Facility: CLINIC | Age: 87
End: 2022-09-22
Payer: MEDICARE

## 2022-09-22 VITALS
SYSTOLIC BLOOD PRESSURE: 122 MMHG | BODY MASS INDEX: 24.46 KG/M2 | HEIGHT: 64 IN | WEIGHT: 143.31 LBS | OXYGEN SATURATION: 98 % | HEART RATE: 58 BPM | DIASTOLIC BLOOD PRESSURE: 66 MMHG

## 2022-09-22 DIAGNOSIS — I65.29 STENOSIS OF CAROTID ARTERY, UNSPECIFIED LATERALITY: ICD-10-CM

## 2022-09-22 DIAGNOSIS — R79.89 PRERENAL AZOTEMIA: ICD-10-CM

## 2022-09-22 DIAGNOSIS — M54.2 NECK PAIN: ICD-10-CM

## 2022-09-22 DIAGNOSIS — I10 ESSENTIAL HYPERTENSION: Primary | ICD-10-CM

## 2022-09-22 DIAGNOSIS — I77.1 SUBCLAVIAN ARTERY STENOSIS, RIGHT: ICD-10-CM

## 2022-09-22 DIAGNOSIS — I70.0 AORTIC ATHEROSCLEROSIS: Chronic | ICD-10-CM

## 2022-09-22 DIAGNOSIS — E78.5 HYPERLIPIDEMIA, UNSPECIFIED HYPERLIPIDEMIA TYPE: Chronic | ICD-10-CM

## 2022-09-22 DIAGNOSIS — N18.32 STAGE 3B CHRONIC KIDNEY DISEASE: Chronic | ICD-10-CM

## 2022-09-22 PROCEDURE — 99214 OFFICE O/P EST MOD 30 MIN: CPT | Mod: PBBFAC,PO | Performed by: NURSE PRACTITIONER

## 2022-09-22 PROCEDURE — 99214 OFFICE O/P EST MOD 30 MIN: CPT | Mod: S$PBB,,, | Performed by: NURSE PRACTITIONER

## 2022-09-22 PROCEDURE — 99999 PR PBB SHADOW E&M-EST. PATIENT-LVL IV: CPT | Mod: PBBFAC,,, | Performed by: NURSE PRACTITIONER

## 2022-09-22 PROCEDURE — 99999 PR PBB SHADOW E&M-EST. PATIENT-LVL IV: ICD-10-PCS | Mod: PBBFAC,,, | Performed by: NURSE PRACTITIONER

## 2022-09-22 PROCEDURE — 99214 PR OFFICE/OUTPT VISIT, EST, LEVL IV, 30-39 MIN: ICD-10-PCS | Mod: S$PBB,,, | Performed by: NURSE PRACTITIONER

## 2022-09-22 RX ORDER — LABETALOL 100 MG/1
100 TABLET, FILM COATED ORAL EVERY 12 HOURS
Qty: 270 TABLET | Refills: 2
Start: 2022-09-22 | End: 2022-10-14

## 2022-09-22 NOTE — PATIENT INSTRUCTIONS
It is important to sit for 5-15 minutes before doing measurements and keep feet flat on the floor with legs uncrossed and no talking to get the best readings    If your knee keeps giving you grief, I recommend seeing Dr. Milton Cobian in orthopedics

## 2022-09-22 NOTE — PROGRESS NOTES
Ms. Lai is a patient of Dr. Camarena and was last seen in [facility] 6/21/2022.      Subjective:   Patient ID:  Gerda Lai is a 89 y.o. female who presents for follow-up of No chief complaint on file.    Problem List:  Hypertension  - intolerance to multiple antihypertensive meds  - wide auscultatory gap  Right subclavian artery stenosis  Bilateral Carotid artery disease, 70-79%  Breast cancer    HPI:   Gerda Lai is in clinic today for follow up after stopping doxazosin for c/o lightheadedness that was posturally mediated.  Reports improvement in her lightheadedness and feeling better after stopping the doxazosin.  Home BP since has been running higher at home.  In talking with her, she is not sitting before checking her bp.  HR running 50-60s at home.  Reports numbness and tingling sensation up her neck which she attributes to her labetolol.  Her clinic BP is running 115-120s/50-60s.     Review of Systems   Constitutional: Negative for decreased appetite, diaphoresis, malaise/fatigue, weight gain and weight loss.   Eyes:  Negative for visual disturbance.   Cardiovascular:  Negative for chest pain, claudication, dyspnea on exertion, irregular heartbeat, leg swelling, near-syncope, orthopnea, palpitations, paroxysmal nocturnal dyspnea and syncope.        Denies chest pressure   Respiratory:  Negative for cough, hemoptysis, shortness of breath, sleep disturbances due to breathing and snoring.    Endocrine: Negative for cold intolerance and heat intolerance.   Hematologic/Lymphatic: Negative for bleeding problem. Does not bruise/bleed easily.   Musculoskeletal:  Positive for arthritis, joint pain, joint swelling and neck pain. Negative for myalgias.   Gastrointestinal:  Negative for bloating, abdominal pain, anorexia, change in bowel habit, constipation, diarrhea, nausea and vomiting.   Neurological:  Negative for difficulty with concentration, disturbances in coordination, excessive daytime  sleepiness, dizziness, headaches, light-headedness, loss of balance, numbness and weakness.   Psychiatric/Behavioral:  The patient does not have insomnia.      Allergies and current medications updated and reviewed:  Review of patient's allergies indicates:   Allergen Reactions    Sulfa (sulfonamide antibiotics) Rash    Clarithromycin Other (See Comments)     Weak, extreme fatigue. dizziness    Flexeril [cyclobenzaprine] Other (See Comments)     Dizziness    Iodine and iodide containing products     Lisinopril Other (See Comments)     Cough and sensation of throat swelling/?angioedema    Losartan Rash    Metoprolol Swelling     Tightness in throat    Tramadol Other (See Comments)     Dizzy and weak    Verapamil (bulk) Palpitations    Voltaren [diclofenac sodium] Other (See Comments)     Drops blood pressure     Current Outpatient Medications   Medication Sig    acetaminophen (TYLENOL) 650 MG TbSR Take 1 tablet (650 mg total) by mouth every 8 (eight) hours as needed.    albuterol (PROVENTIL/VENTOLIN HFA) 90 mcg/actuation inhaler Inhale 2 puffs into the lungs every 6 (six) hours as needed for Wheezing. Rescue (Patient not taking: Reported on 7/25/2022)    aliskiren (TEKTURNA) 300 MG Tab TAKE 1 TABLET BY MOUTH EVERY DAY    aspirin (ECOTRIN) 81 MG EC tablet TAKE 1 TABLET BY MOUTH EVERY DAY    coenzyme Q10 100 mg capsule Take 100 mg by mouth once daily.    felodipine (PLENDIL) 2.5 MG Tb24 TAKE 2 TABLETS (5 MG TOTAL) BY MOUTH ONCE DAILY.    glucosamine-chondroitin 500-400 mg tablet Take 1 tablet by mouth once daily.     labetaloL (NORMODYNE) 100 MG tablet TAKE 1.5 TABLETS (150 MG TOTAL) BY MOUTH EVERY 12 (TWELVE) HOURS.    lactase (LACTAID) 3,000 unit tablet Take 1 tablet by mouth 3 (three) times daily as needed.    LORazepam (ATIVAN) 0.5 MG tablet TAKE 1/2 TABLET TO 1 TABLET BY MOUTH EVERY 12 HOURS AS NEEDED FOR ANXIETY    multivitamin capsule Take 1 capsule by mouth once daily.    omeprazole (PRILOSEC OTC) 20 MG tablet  "Take 20 mg by mouth once daily.    polymyxin B sulf-trimethoprim (POLYTRIM) 10,000 unit- 1 mg/mL Drop Place 1 drop into both eyes every 6 (six) hours.    pravastatin (PRAVACHOL) 80 MG tablet Take 1 tablet (80 mg total) by mouth once daily.    TURMERIC ORAL Take 500 mg by mouth once daily.     vitamin D (VITAMIN D3) 1000 units Tab Take 1,000 Units by mouth once daily.     No current facility-administered medications for this visit.       Objective:        /66   Pulse (!) 58   Ht 5' 4" (1.626 m)   Wt 65 kg (143 lb 4.8 oz)   SpO2 98%   BMI 24.60 kg/m²       Physical Exam  Vitals and nursing note reviewed.   Constitutional:       General: She is not in acute distress.     Appearance: Normal appearance. She is well-developed. She is not diaphoretic.   HENT:      Head: Normocephalic and atraumatic.   Eyes:      General: Lids are normal. No scleral icterus.     Conjunctiva/sclera: Conjunctivae normal.   Neck:      Vascular: Normal carotid pulses. No carotid bruit, hepatojugular reflux or JVD.   Cardiovascular:      Rate and Rhythm: Normal rate and regular rhythm.      Chest Wall: PMI is not displaced.      Pulses: Intact distal pulses.           Carotid pulses are 2+ on the right side and 2+ on the left side.       Radial pulses are 2+ on the right side and 2+ on the left side.        Dorsalis pedis pulses are 2+ on the right side and 2+ on the left side.        Posterior tibial pulses are 1+ on the right side and 1+ on the left side.      Heart sounds: S1 normal and S2 normal. No murmur heard.    No friction rub. No gallop.   Pulmonary:      Effort: Pulmonary effort is normal. No respiratory distress.      Breath sounds: Normal breath sounds. No decreased breath sounds, wheezing, rhonchi or rales.   Chest:      Chest wall: No tenderness.   Abdominal:      General: Bowel sounds are normal. There is no distension or abdominal bruit.      Palpations: Abdomen is soft. There is no fluid wave or pulsatile mass.      " Tenderness: There is no abdominal tenderness.   Musculoskeletal:      Cervical back: Neck supple.   Skin:     General: Skin is warm and dry.      Findings: No rash.      Nails: There is no clubbing.   Neurological:      Mental Status: She is alert and oriented to person, place, and time.      Gait: Gait normal.   Psychiatric:         Speech: Speech normal.         Behavior: Behavior normal.         Thought Content: Thought content normal.         Judgment: Judgment normal.       Chemistry        Component Value Date/Time     09/15/2022 1035    K 4.3 09/15/2022 1035     09/15/2022 1035    CO2 23 09/15/2022 1035    BUN 28 (H) 09/15/2022 1035    CREATININE 1.2 09/15/2022 1035    GLU 86 09/15/2022 1035        Component Value Date/Time    CALCIUM 9.8 09/15/2022 1035    ALKPHOS 66 09/15/2022 1035    AST 24 09/15/2022 1035    ALT 18 09/15/2022 1035    BILITOT 1.1 (H) 09/15/2022 1035    ESTGFRAFRICA 51.4 (A) 06/14/2022 1039    EGFRNONAA 44.6 (A) 06/14/2022 1039        Lab Results   Component Value Date    HGBA1C 5.5 05/27/2011       Recent Labs   Lab 12/30/19  1620 01/09/20  1010 01/12/22  1152 06/14/22  1039 09/15/22  1035   WBC 12.37   < > 7.13  --   --    Hemoglobin 10.7 L   < > 11.5 L  --   --    Hematocrit 33.0 L   < > 35.9 L  --   --     H   < > 101 H  --   --    Platelets 230   < > 258  --   --     H  --   --   --   --    TSH  --    < > 1.821  --   --    Cholesterol  --    < > 167   < > 174   HDL  --    < > 54   < > 53   LDL Cholesterol  --    < > 84.8   < > 86.2   Triglycerides  --    < > 141   < > 174 H   HDL/Cholesterol Ratio  --    < > 32.3   < > 30.5    < > = values in this interval not displayed.     Lab Results   Component Value Date    BERWHHOM21 745 01/21/2020     Lab Results   Component Value Date    FOLATE 17.4 01/21/2020       Recent Labs   Lab 08/05/20  1122   INR 1.0        Test(s) Reviewed  I have reviewed the following in detail:  [] Stress test   [] Angiography   [x]  Echocardiogram   [x] Labs   [] Other:         Assessment/Plan:   1. Essential hypertension  Clinic BP at goal <130/80. Unclear why home bp so high.  Suspect patient not sitting with feet flat on floor for 5-15 minutes a day.  She would like to reduce her labetolol.      - labetaloL (NORMODYNE) 100 MG tablet; Take 1 tablet (100 mg total) by mouth every 12 (twelve) hours.  Dispense: 270 tablet; Refill: 2    2. Hyperlipidemia, unspecified hyperlipidemia type  LDL not at goal <70. Pravastatin was increased from 40 mg to 80 mg at her last visit.  If LDL remains above goal, consider adding ezetamibe or changing to higher intensity statin.        3. Stenosis of carotid artery, unspecified laterality  Moderate to severe carotid stenosis, >70% bilaterally.  D/w Dr. Carrington at her last visit and will medically manage.     4. Aortic atherosclerosis      5. Subclavian artery stenosis, right  Asymptomatic. Stable. Monitor.     6. Stage 3b chronic kidney disease  Stable. Mild. Avoid NSAIDs.     7. Prerenal azotemia  Encouraged increased oral hydration    8.  Neck pain  Encouraged f/u with Dr. Beltran.  She is requested PT for her neck.  Will send note to Dr. Beltran       A copy of this note will be forwarded to Dr. Camarena and Dr. Beltran.     Follow up in about 6 months (around 3/22/2023).

## 2022-09-28 ENCOUNTER — PATIENT MESSAGE (OUTPATIENT)
Dept: CARDIOLOGY | Facility: CLINIC | Age: 87
End: 2022-09-28
Payer: MEDICARE

## 2022-10-17 DIAGNOSIS — I10 HYPERTENSION, ESSENTIAL: Chronic | ICD-10-CM

## 2022-10-18 ENCOUNTER — TELEPHONE (OUTPATIENT)
Dept: AUDIOLOGY | Facility: CLINIC | Age: 87
End: 2022-10-18
Payer: MEDICARE

## 2022-10-18 RX ORDER — ALISKIREN 300 MG/1
TABLET, FILM COATED ORAL
Qty: 90 TABLET | Refills: 1 | Status: SHIPPED | OUTPATIENT
Start: 2022-10-18 | End: 2023-03-09

## 2022-10-18 RX ORDER — LORAZEPAM 0.5 MG/1
TABLET ORAL
Qty: 30 TABLET | Refills: 0 | Status: SHIPPED | OUTPATIENT
Start: 2022-10-18 | End: 2022-12-20

## 2022-10-18 NOTE — TELEPHONE ENCOUNTER
checked.  No suspicious activity noted.  Refill done.    Needs appointment to get further refills.  One refill done.

## 2022-11-22 ENCOUNTER — PATIENT MESSAGE (OUTPATIENT)
Dept: ORTHOPEDICS | Facility: CLINIC | Age: 87
End: 2022-11-22
Payer: MEDICARE

## 2022-11-22 DIAGNOSIS — M79.641 RIGHT HAND PAIN: Primary | ICD-10-CM

## 2022-11-23 ENCOUNTER — OFFICE VISIT (OUTPATIENT)
Dept: INTERNAL MEDICINE | Facility: CLINIC | Age: 87
End: 2022-11-23
Payer: MEDICARE

## 2022-11-23 ENCOUNTER — OFFICE VISIT (OUTPATIENT)
Dept: ORTHOPEDICS | Facility: CLINIC | Age: 87
End: 2022-11-23
Payer: MEDICARE

## 2022-11-23 ENCOUNTER — HOSPITAL ENCOUNTER (OUTPATIENT)
Dept: RADIOLOGY | Facility: HOSPITAL | Age: 87
Discharge: HOME | End: 2022-11-23
Attending: ORTHOPAEDIC SURGERY
Payer: MEDICARE

## 2022-11-23 VITALS — BODY MASS INDEX: 24.24 KG/M2 | HEIGHT: 64 IN | WEIGHT: 142 LBS

## 2022-11-23 VITALS
SYSTOLIC BLOOD PRESSURE: 170 MMHG | HEART RATE: 64 BPM | WEIGHT: 142.63 LBS | RESPIRATION RATE: 18 BRPM | HEIGHT: 64 IN | OXYGEN SATURATION: 96 % | BODY MASS INDEX: 24.35 KG/M2 | TEMPERATURE: 98 F | DIASTOLIC BLOOD PRESSURE: 70 MMHG

## 2022-11-23 DIAGNOSIS — M19.042 PRIMARY OSTEOARTHRITIS OF BOTH HANDS: Primary | ICD-10-CM

## 2022-11-23 DIAGNOSIS — M79.642 LEFT HAND PAIN: Primary | ICD-10-CM

## 2022-11-23 DIAGNOSIS — M79.641 RIGHT HAND PAIN: ICD-10-CM

## 2022-11-23 DIAGNOSIS — G89.29 CHRONIC RIGHT SHOULDER PAIN: ICD-10-CM

## 2022-11-23 DIAGNOSIS — I10 HYPERTENSION, ESSENTIAL: Primary | Chronic | ICD-10-CM

## 2022-11-23 DIAGNOSIS — N18.32 STAGE 3B CHRONIC KIDNEY DISEASE: Chronic | ICD-10-CM

## 2022-11-23 DIAGNOSIS — M25.511 CHRONIC RIGHT SHOULDER PAIN: ICD-10-CM

## 2022-11-23 DIAGNOSIS — M79.641 CHRONIC PAIN OF RIGHT HAND: ICD-10-CM

## 2022-11-23 DIAGNOSIS — M79.642 CHRONIC PAIN OF LEFT HAND: ICD-10-CM

## 2022-11-23 DIAGNOSIS — M19.041 PRIMARY OSTEOARTHRITIS OF BOTH HANDS: Primary | ICD-10-CM

## 2022-11-23 DIAGNOSIS — G89.29 CHRONIC PAIN OF RIGHT HAND: ICD-10-CM

## 2022-11-23 DIAGNOSIS — G89.29 CHRONIC PAIN OF RIGHT KNEE: ICD-10-CM

## 2022-11-23 DIAGNOSIS — G89.29 CHRONIC PAIN OF LEFT HAND: ICD-10-CM

## 2022-11-23 DIAGNOSIS — I70.0 AORTIC ATHEROSCLEROSIS: Chronic | ICD-10-CM

## 2022-11-23 DIAGNOSIS — E78.5 HYPERLIPIDEMIA, UNSPECIFIED HYPERLIPIDEMIA TYPE: Chronic | ICD-10-CM

## 2022-11-23 DIAGNOSIS — I65.29 STENOSIS OF CAROTID ARTERY, UNSPECIFIED LATERALITY: ICD-10-CM

## 2022-11-23 DIAGNOSIS — M72.0 DUPUYTREN'S DISEASE OF PALM OF RIGHT HAND: ICD-10-CM

## 2022-11-23 DIAGNOSIS — M25.561 CHRONIC PAIN OF RIGHT KNEE: ICD-10-CM

## 2022-11-23 PROBLEM — I77.9 CAROTID ARTERIAL DISEASE: Chronic | Status: ACTIVE | Noted: 2020-07-17

## 2022-11-23 PROCEDURE — 73130 X-RAY EXAM OF HAND: CPT | Mod: 26,50,, | Performed by: RADIOLOGY

## 2022-11-23 PROCEDURE — 99999 PR PBB SHADOW E&M-EST. PATIENT-LVL III: ICD-10-PCS | Mod: PBBFAC,,, | Performed by: ORTHOPAEDIC SURGERY

## 2022-11-23 PROCEDURE — 99999 PR PBB SHADOW E&M-EST. PATIENT-LVL V: ICD-10-PCS | Mod: PBBFAC,,, | Performed by: INTERNAL MEDICINE

## 2022-11-23 PROCEDURE — 73130 X-RAY EXAM OF HAND: CPT | Mod: TC,50,PO

## 2022-11-23 PROCEDURE — 99213 OFFICE O/P EST LOW 20 MIN: CPT | Mod: PBBFAC,27,PO,25 | Performed by: ORTHOPAEDIC SURGERY

## 2022-11-23 PROCEDURE — 20600 SMALL JOINT ASPIRATION/INJECTION: L RING PIP: ICD-10-PCS | Mod: S$PBB,LT,, | Performed by: ORTHOPAEDIC SURGERY

## 2022-11-23 PROCEDURE — 99214 OFFICE O/P EST MOD 30 MIN: CPT | Mod: S$PBB,25,, | Performed by: ORTHOPAEDIC SURGERY

## 2022-11-23 PROCEDURE — 99214 PR OFFICE/OUTPT VISIT, EST, LEVL IV, 30-39 MIN: ICD-10-PCS | Mod: S$PBB,25,, | Performed by: ORTHOPAEDIC SURGERY

## 2022-11-23 PROCEDURE — 99999 PR PBB SHADOW E&M-EST. PATIENT-LVL III: CPT | Mod: PBBFAC,,, | Performed by: ORTHOPAEDIC SURGERY

## 2022-11-23 PROCEDURE — 99214 PR OFFICE/OUTPT VISIT, EST, LEVL IV, 30-39 MIN: ICD-10-PCS | Mod: S$PBB,,, | Performed by: INTERNAL MEDICINE

## 2022-11-23 PROCEDURE — 99214 OFFICE O/P EST MOD 30 MIN: CPT | Mod: S$PBB,,, | Performed by: INTERNAL MEDICINE

## 2022-11-23 PROCEDURE — 20600 DRAIN/INJ JOINT/BURSA W/O US: CPT | Mod: PBBFAC,PO,LT | Performed by: ORTHOPAEDIC SURGERY

## 2022-11-23 PROCEDURE — 99215 OFFICE O/P EST HI 40 MIN: CPT | Mod: PBBFAC,PO,25 | Performed by: INTERNAL MEDICINE

## 2022-11-23 PROCEDURE — 99999 PR PBB SHADOW E&M-EST. PATIENT-LVL V: CPT | Mod: PBBFAC,,, | Performed by: INTERNAL MEDICINE

## 2022-11-23 PROCEDURE — 73130 XR HAND COMPLETE 3 VIEWS BILATERAL: ICD-10-PCS | Mod: 26,50,, | Performed by: RADIOLOGY

## 2022-11-23 RX ORDER — DOXAZOSIN 1 MG/1
1 TABLET ORAL DAILY
COMMUNITY
Start: 2022-09-10 | End: 2023-01-30

## 2022-11-23 RX ORDER — FELODIPINE 10 MG/1
10 TABLET, EXTENDED RELEASE ORAL DAILY
Qty: 90 TABLET | Refills: 3 | Status: SHIPPED | OUTPATIENT
Start: 2022-11-23 | End: 2023-03-08

## 2022-11-23 RX ADMIN — METHYLPREDNISOLONE ACETATE 40 MG: 40 INJECTION, SUSPENSION INTRALESIONAL; INTRAMUSCULAR; INTRASYNOVIAL; SOFT TISSUE at 02:11

## 2022-11-23 NOTE — PROGRESS NOTES
"Gerda Lai presents for follow up evaluation of left ring finger PIP osteoarthritis, bilateral hand osteoarthritis, Dupuytren's disease right palm    She continues to wear the splint at night, but has worsened pain of the DIP joints on the right hand and left ring PIP in particular. Uses a cane in her right hand.    Vitals:    11/23/22 1449 11/23/22 1450   Weight: 64.4 kg (142 lb)    Height: 5' 4" (1.626 m)    PainSc:   3   3       PE:    AA&O x 4.  NAD  HEENT:  NCAT, sclera nonicteric  Lungs:  Respirations are equal and unlabored.  CV:  2+ bilateral upper and lower extremity pulses.  MSK:  Significant enlargement bilateral CMC joints.  Dupuytren's nodules right palm, no contracture.  Bilateral Frances's and Heberden's nodes.  Tender to palpation left ring PIP, synovitis, decreased PIP range of motion.  Neurovascularly intact bilaterally.  5/5 thenar and intrinsic musculature strength.  Full passive motion wrists.      X-rays AP, lateral and oblique bilateral hands taken today are independently reviewed by me and shows Eaton stage III basilar thumb arthritis as well as advanced IP joint arthritic changes with osteophyte formation.       A/P:  Left ring finger PIP osteoarthritis, bilateral hand osteoarthritis, Dupuytren's disease right palm  1) We have discussed the natural history of basilar thumb and hand arthritis including treatment options such as splinting, oral and topical anti-inflammatories, cortisone injections and surgery.    -I have offered her a selective injection. I have explained the risks, benefits, and alternatives of the procedure in detail.  The patient voices understanding and all questions have been answered. The patient agrees to proceed as planned. So after a sterile prep of the skin in the normal fashion the left ring PIP joint was injected using a 25 gauge needle with a combination of 1cc 1% plain lidocaine and 40 mg of methylprednisolone.  The patient is cautioned and immediate relief " of pain is secondary to the local anesthetic and will be temporary.  After the anesthetic wears off there may be a increase in pain that may last for a few hours or a few days and they should use ice to help alleviate this flair up of pain. Patient tolerated the procedure well.    - F/U as needed for any worsening of symptoms  - Call with any questions/concerns in the interim         Barbara Aldridge MD    Please be aware that this note has been generated with the assistance of [employer] voice-to-text.  Please excuse any spelling or grammatical errors.

## 2022-11-23 NOTE — PROGRESS NOTES
Subjective:       Patient ID: Gerda Lai is a 89 y.o. female.    Chief Complaint: Follow-up    HPI    89-year-old female here for 6 month follow-up.  Her quality of life has changed over the last 2.5 yrs.  She has deteriorated since.  She cannot pinpoint a particular time when this happened.  She has knee problems and had trouble walking.  She had one knee operated on.  Her calf feels like it is numb after the operation.  The most pain she has in in her shoulder and neck. She has been to pain management.      HTN - Patient is currently on plendil 2.5 mg, labetalol 100 mg, cardura 1 mg. She does check her BP at home, and it runs all over the place (has been on digital HTN).  She will get as low as 140/50-60 and goes as high as 200 systolic. She is as high as 170 on a regular basis.  Side effects of medications note: none. Denies headaches, blurred vision, chest pain, shortness of breath, nausea.  She had trouble getting out of her driveway to get here.    HLD - Patient is currently on pravastatin 80 mg.  Her last lipid panel was   Cholesterol   Date Value Ref Range Status   09/15/2022 174 120 - 199 mg/dL Final     Comment:     The National Cholesterol Education Program (NCEP) has set the  following guidelines (reference ranges) for Cholesterol:  Optimal.....................<200 mg/dL  Borderline High.............200-239 mg/dL  High........................> or = 240 mg/dL       Triglycerides   Date Value Ref Range Status   09/15/2022 174 (H) 30 - 150 mg/dL Final     Comment:     The National Cholesterol Education Program (NCEP) has set the  following guidelines (reference values) for triglycerides:  Normal......................<150 mg/dL  Borderline High.............150-199 mg/dL  High........................200-499 mg/dL       HDL   Date Value Ref Range Status   09/15/2022 53 40 - 75 mg/dL Final     Comment:     The National Cholesterol Education Program (NCEP) has set the  following guidelines (reference  values) for HDL Cholesterol:  Low...............<40 mg/dL  Optimal...........>60 mg/dL       LDL Cholesterol   Date Value Ref Range Status   09/15/2022 86.2 63.0 - 159.0 mg/dL Final     Comment:     The National Cholesterol Education Program (NCEP) has set the  following guidelines (reference values) for LDL Cholesterol:  Optimal.......................<130 mg/dL  Borderline High...............130-159 mg/dL  High..........................160-189 mg/dL  Very High.....................>190 mg/dL     .  Side effects of the medication: none.    Review of Systems      Objective:      Physical Exam  Vitals reviewed.   Constitutional:       Appearance: She is well-developed.   HENT:      Head: Normocephalic and atraumatic.      Mouth/Throat:      Pharynx: No oropharyngeal exudate.   Eyes:      General: No scleral icterus.        Right eye: No discharge.         Left eye: No discharge.      Pupils: Pupils are equal, round, and reactive to light.   Neck:      Thyroid: No thyromegaly.      Trachea: No tracheal deviation.   Cardiovascular:      Rate and Rhythm: Normal rate and regular rhythm.      Heart sounds: Normal heart sounds. No murmur heard.    No friction rub. No gallop.   Pulmonary:      Effort: Pulmonary effort is normal. No respiratory distress.      Breath sounds: Normal breath sounds. No wheezing or rales.   Chest:      Chest wall: No tenderness.   Abdominal:      General: Bowel sounds are normal. There is no distension.      Palpations: Abdomen is soft. There is no mass.      Tenderness: There is no abdominal tenderness. There is no guarding or rebound.   Musculoskeletal:         General: No tenderness. Normal range of motion.      Cervical back: Normal range of motion and neck supple.   Skin:     General: Skin is warm and dry.      Coloration: Skin is not pale.      Findings: No erythema or rash.   Neurological:      Mental Status: She is alert and oriented to person, place, and time.   Psychiatric:          Behavior: Behavior normal.       Assessment:       1. Hypertension, essential    2. Hyperlipidemia, unspecified hyperlipidemia type    3. Stage 3b chronic kidney disease    4. Aortic atherosclerosis    5. Stenosis of carotid artery, unspecified laterality    6. Chronic pain of right knee  - Ambulatory referral/consult to Orthopedics; Future    7. Chronic right shoulder pain  - Ambulatory referral/consult to Orthopedics; Future      Plan:       1. Increase Plendil to 10 mg.  Continue Cardura 1 mg, labetalol 100 mg.  Return to clinic in a month or sooner if needed.  2.  Continue pravastatin 80 mg.  3.  Monitor   4/5.  Continue pravastatin.    6/7.  Refer to orthopedics.

## 2022-11-25 ENCOUNTER — TELEPHONE (OUTPATIENT)
Dept: ORTHOPEDICS | Facility: CLINIC | Age: 87
End: 2022-11-25
Payer: MEDICARE

## 2022-11-25 ENCOUNTER — PATIENT MESSAGE (OUTPATIENT)
Dept: ORTHOPEDICS | Facility: CLINIC | Age: 87
End: 2022-11-25
Payer: MEDICARE

## 2022-11-25 DIAGNOSIS — M51.36 DDD (DEGENERATIVE DISC DISEASE), LUMBAR: Primary | ICD-10-CM

## 2022-11-28 ENCOUNTER — PATIENT MESSAGE (OUTPATIENT)
Dept: ORTHOPEDICS | Facility: CLINIC | Age: 87
End: 2022-11-28
Payer: MEDICARE

## 2022-11-28 ENCOUNTER — OFFICE VISIT (OUTPATIENT)
Dept: URGENT CARE | Facility: CLINIC | Age: 87
End: 2022-11-28
Payer: MEDICARE

## 2022-11-28 VITALS
WEIGHT: 142 LBS | DIASTOLIC BLOOD PRESSURE: 76 MMHG | SYSTOLIC BLOOD PRESSURE: 186 MMHG | RESPIRATION RATE: 16 BRPM | HEIGHT: 64 IN | TEMPERATURE: 99 F | BODY MASS INDEX: 24.24 KG/M2 | HEART RATE: 66 BPM | OXYGEN SATURATION: 98 %

## 2022-11-28 DIAGNOSIS — M50.30 DDD (DEGENERATIVE DISC DISEASE), CERVICAL: Primary | ICD-10-CM

## 2022-11-28 DIAGNOSIS — J06.9 VIRAL URI WITH COUGH: Primary | ICD-10-CM

## 2022-11-28 LAB
CTP QC/QA: YES
SARS-COV-2 RDRP RESP QL NAA+PROBE: NEGATIVE

## 2022-11-28 PROCEDURE — 87635 SARS-COV-2 COVID-19 AMP PRB: CPT | Mod: QW,CR,S$GLB, | Performed by: NURSE PRACTITIONER

## 2022-11-28 PROCEDURE — 99213 PR OFFICE/OUTPT VISIT, EST, LEVL III, 20-29 MIN: ICD-10-PCS | Mod: S$GLB,,, | Performed by: NURSE PRACTITIONER

## 2022-11-28 PROCEDURE — 99213 OFFICE O/P EST LOW 20 MIN: CPT | Mod: S$GLB,,, | Performed by: NURSE PRACTITIONER

## 2022-11-28 PROCEDURE — 87635: ICD-10-PCS | Mod: QW,CR,S$GLB, | Performed by: NURSE PRACTITIONER

## 2022-11-28 RX ORDER — PROMETHAZINE HYDROCHLORIDE AND DEXTROMETHORPHAN HYDROBROMIDE 6.25; 15 MG/5ML; MG/5ML
5 SYRUP ORAL EVERY 8 HOURS PRN
Qty: 118 ML | Refills: 0 | Status: SHIPPED | OUTPATIENT
Start: 2022-11-28 | End: 2022-12-09

## 2022-11-28 RX ORDER — BENZONATATE 200 MG/1
200 CAPSULE ORAL 3 TIMES DAILY PRN
Qty: 30 CAPSULE | Refills: 0 | Status: SHIPPED | OUTPATIENT
Start: 2022-11-28 | End: 2022-12-08

## 2022-11-29 NOTE — PROGRESS NOTES
"Subjective:       Patient ID: Gerda Lai is a 89 y.o. female.    Vitals:  height is 5' 4" (1.626 m) and weight is 64.4 kg (142 lb). Her oral temperature is 98.9 °F (37.2 °C). Her blood pressure is 186/76 (abnormal) and her pulse is 66. Her respiration is 16 and oxygen saturation is 98%.     Chief Complaint: Cough    This is a 89 y.o. female who presents today with a chief complaint of cough. Cough started on Saturday and progressed, yesterday. Patient has been using chloroseptic for throat irritation.       Cough  This is a new problem. The current episode started in the past 7 days. The problem has been gradually worsening. The problem occurs constantly. The cough is Productive of sputum. She has tried nothing for the symptoms. The treatment provided mild relief.     Respiratory:  Positive for cough.      Objective:      Physical Exam   Constitutional: She is oriented to person, place, and time. She appears well-developed. She is cooperative.  Non-toxic appearance. She does not appear ill. No distress.   HENT:   Head: Normocephalic.   Ears:   Right Ear: Hearing, tympanic membrane, external ear and ear canal normal.   Left Ear: Hearing, tympanic membrane, external ear and ear canal normal.   Nose: Congestion present. No mucosal edema, rhinorrhea or nasal deformity. No epistaxis. Right sinus exhibits no maxillary sinus tenderness and no frontal sinus tenderness. Left sinus exhibits no maxillary sinus tenderness and no frontal sinus tenderness.   Mouth/Throat: Uvula is midline and mucous membranes are normal. Mucous membranes are moist. No trismus in the jaw. Normal dentition. No uvula swelling. Posterior oropharyngeal erythema present. No oropharyngeal exudate or posterior oropharyngeal edema. Oropharynx is clear.   Eyes: Lids are normal. No scleral icterus.   Neck: Trachea normal and phonation normal. Neck supple. No edema present. No erythema present. No neck rigidity present.   Cardiovascular: Normal rate, " regular rhythm and normal heart sounds.   Pulmonary/Chest: Effort normal and breath sounds normal. No respiratory distress. She has no decreased breath sounds. She has no rhonchi.   Abdominal: Normal appearance.   Musculoskeletal: Normal range of motion.         General: No deformity. Normal range of motion.   Neurological: She is alert and oriented to person, place, and time. She exhibits normal muscle tone. Coordination normal.   Skin: Skin is warm, dry, intact, not diaphoretic and not pale.   Psychiatric: Her speech is normal and behavior is normal. Judgment and thought content normal.   Nursing note and vitals reviewed.      Results for orders placed or performed in visit on 11/28/22   POCT COVID-19 Rapid Screening   Result Value Ref Range    POC Rapid COVID Negative Negative     Acceptable Yes      *Note: Due to a large number of results and/or encounters for the requested time period, some results have not been displayed. A complete set of results can be found in Results Review.      Assessment:       1. Viral URI with cough          Plan:         Viral URI with cough  -     POCT COVID-19 Rapid Screening  -     benzonatate (TESSALON) 200 MG capsule; Take 1 capsule (200 mg total) by mouth 3 (three) times daily as needed for Cough.  Dispense: 30 capsule; Refill: 0  -     promethazine-dextromethorphan (PROMETHAZINE-DM) 6.25-15 mg/5 mL Syrp; Take 5 mLs by mouth every 8 (eight) hours as needed (cough).  Dispense: 118 mL; Refill: 0       Patient Instructions   Oral fluids  Rest  Steam (hot showers, hot tea)  Blow nose often  Avoid circulating air (such as ceiling fans) dries your airway  Avoid drinking cold drinks (worsens cough)  Therapeutic coughing to expel mucous  Sit in upright position often

## 2022-12-02 ENCOUNTER — NURSE TRIAGE (OUTPATIENT)
Dept: ADMINISTRATIVE | Facility: CLINIC | Age: 87
End: 2022-12-02
Payer: MEDICARE

## 2022-12-02 ENCOUNTER — TELEPHONE (OUTPATIENT)
Dept: OTOLARYNGOLOGY | Facility: CLINIC | Age: 87
End: 2022-12-02
Payer: MEDICARE

## 2022-12-02 RX ORDER — METHYLPREDNISOLONE ACETATE 40 MG/ML
40 INJECTION, SUSPENSION INTRA-ARTICULAR; INTRALESIONAL; INTRAMUSCULAR; SOFT TISSUE
Status: DISCONTINUED | OUTPATIENT
Start: 2022-11-23 | End: 2022-12-02 | Stop reason: HOSPADM

## 2022-12-02 NOTE — TELEPHONE ENCOUNTER
Attempted to reach pt via phone. No answer to phone. Vm left to return call to discuss triage nurse discussion and need to address pain ASAP

## 2022-12-02 NOTE — TELEPHONE ENCOUNTER
"Pt states productive cough since Monday[place] [facility] [place] two medications; promethazine-dm & tessalon. Developed constipation & dry mouth after taking medications.     Pt is extremely SOB on the phone and struggling to speak. She states, "I have these spells where I have a hard time breathing, especially after coughing and have to sit down." Pt can be heard coughing on the phone and needs to take breaks in between talking.     Pt also notes runny nose with blood-tinged mucous. Care Advice recommends that pt call  now. Pt verbalizes understanding and agrees to dispo.  Reason for Disposition   SEVERE difficulty breathing (e.g., struggling for each breath, speaks in single words)    Additional Information   Negative: Bluish (or gray) lips or face    Protocols used: Cough-A-OH    "

## 2022-12-02 NOTE — PROCEDURES
Small Joint Aspiration/Injection: L ring PIP    Date/Time: 11/23/2022 2:30 PM  Performed by: Barbara Aldridge MD  Authorized by: Barbara Aldridge MD     Consent Done?:  Yes (Verbal)  Indications:  Pain  Timeout: prior to procedure the correct patient, procedure, and site was verified    Prep: patient was prepped and draped in usual sterile fashion      Local anesthesia used?: Yes    Anesthesia:  Local infiltration  Local anesthetic:  Lidocaine 1% without epinephrine  Location:  Ring finger  Site:  L ring PIP  Needle size:  25 G  Medications:  40 mg methylPREDNISolone acetate 40 mg/mL  Patient tolerance:  Patient tolerated the procedure well with no immediate complications

## 2022-12-03 ENCOUNTER — NURSE TRIAGE (OUTPATIENT)
Dept: ADMINISTRATIVE | Facility: CLINIC | Age: 87
End: 2022-12-03
Payer: MEDICARE

## 2022-12-03 NOTE — TELEPHONE ENCOUNTER
Pt c/o productive cough and seen on Mon or Tues and prescribed promethazine and tessalon in UC. Stated that she stopped taking meds due to having dry mouth. Pt now c/o yellow/grey blood tinged mucus and decreased appetite and drinking. Denies SOB. However pt sounds SOB on call. Care advice to go to ED/UC now per protocol. Pt did not want to go to ED due to exposure to illnesses. Agreed to go to UC.  Pt also stated that she was seen by EMS on yesterday. Encounter routed to provider.       Reason for Disposition   Patient sounds very sick or weak to the triager    Additional Information   Negative: SEVERE difficulty breathing (e.g., struggling for each breath, speaks in single words)   Negative: Bluish (or gray) lips or face now   Negative: [1] Difficulty breathing AND [2] exposure to flames, smoke, or fumes   Negative: [1] Stridor AND [2] difficulty breathing   Negative: Sounds like a life-threatening emergency to the triager   Negative: [1] MODERATE difficulty breathing (e.g., speaks in phrases, SOB even at rest, pulse 100-120) AND [2] still present when not coughing   Negative: Chest pain  (Exception: MILD central chest pain, present only when coughing)    Protocols used: Cough - Acute Xahyoljjcb-N-PH

## 2022-12-03 NOTE — TELEPHONE ENCOUNTER
Agree with disposition of emergency room.   She states she is having muscle dysfunctions with her esophagus, colon and stomach. She feels this has gotten worse. Symptoms include trouble swallowing, constipation and food sitting in her stomach. She has seen GI who has done an EGD, swallow eval, gastric emptying, anal manometry and all tests were abnormal. She has esophogeal motility, gastroparesis and colonic dyssenergia. She wanted to know if that is somehow related to a possible neurologic problem she is having or may be neurology related.

## 2022-12-05 ENCOUNTER — OFFICE VISIT (OUTPATIENT)
Dept: OTOLARYNGOLOGY | Facility: CLINIC | Age: 87
End: 2022-12-05
Payer: MEDICARE

## 2022-12-05 ENCOUNTER — HOSPITAL ENCOUNTER (OUTPATIENT)
Dept: RADIOLOGY | Facility: HOSPITAL | Age: 87
Discharge: HOME | End: 2022-12-05
Attending: REGISTERED NURSE
Payer: MEDICARE

## 2022-12-05 ENCOUNTER — TELEPHONE (OUTPATIENT)
Dept: INTERNAL MEDICINE | Facility: CLINIC | Age: 87
End: 2022-12-05
Payer: MEDICARE

## 2022-12-05 ENCOUNTER — PATIENT MESSAGE (OUTPATIENT)
Dept: OTOLARYNGOLOGY | Facility: CLINIC | Age: 87
End: 2022-12-05

## 2022-12-05 ENCOUNTER — HOSPITAL ENCOUNTER (OUTPATIENT)
Dept: RADIOLOGY | Facility: HOSPITAL | Age: 87
Discharge: HOME | End: 2022-12-05
Attending: OTOLARYNGOLOGY
Payer: MEDICARE

## 2022-12-05 VITALS — SYSTOLIC BLOOD PRESSURE: 113 MMHG | HEART RATE: 82 BPM | DIASTOLIC BLOOD PRESSURE: 58 MMHG | TEMPERATURE: 100 F

## 2022-12-05 DIAGNOSIS — R06.02 SHORTNESS OF BREATH: ICD-10-CM

## 2022-12-05 DIAGNOSIS — R05.1 ACUTE COUGH: ICD-10-CM

## 2022-12-05 DIAGNOSIS — R09.3 EXPECTORATION OF ABNORMAL SPUTUM: ICD-10-CM

## 2022-12-05 DIAGNOSIS — M50.30 DDD (DEGENERATIVE DISC DISEASE), CERVICAL: ICD-10-CM

## 2022-12-05 DIAGNOSIS — J34.2 NASAL SEPTAL DEVIATION: ICD-10-CM

## 2022-12-05 DIAGNOSIS — R05.1 ACUTE COUGH: Primary | ICD-10-CM

## 2022-12-05 PROCEDURE — 72050 X-RAY EXAM NECK SPINE 4/5VWS: CPT | Mod: TC

## 2022-12-05 PROCEDURE — 92511 NASOPHARYNGOSCOPY: CPT | Mod: S$PBB,,, | Performed by: OTOLARYNGOLOGY

## 2022-12-05 PROCEDURE — 92511 PR NASOPHARYNGOSCOPY: ICD-10-PCS | Mod: S$PBB,,, | Performed by: OTOLARYNGOLOGY

## 2022-12-05 PROCEDURE — 99213 PR OFFICE/OUTPT VISIT, EST, LEVL III, 20-29 MIN: ICD-10-PCS | Mod: S$PBB,25,, | Performed by: OTOLARYNGOLOGY

## 2022-12-05 PROCEDURE — 99999 PR PBB SHADOW E&M-EST. PATIENT-LVL IV: ICD-10-PCS | Mod: PBBFAC,,, | Performed by: OTOLARYNGOLOGY

## 2022-12-05 PROCEDURE — 87070 CULTURE OTHR SPECIMN AEROBIC: CPT | Performed by: OTOLARYNGOLOGY

## 2022-12-05 PROCEDURE — 92511 NASOPHARYNGOSCOPY: CPT | Mod: PBBFAC | Performed by: OTOLARYNGOLOGY

## 2022-12-05 PROCEDURE — 72050 XR CERVICAL SPINE AP LAT WITH FLEX EXTEN: ICD-10-PCS | Mod: 26,,, | Performed by: RADIOLOGY

## 2022-12-05 PROCEDURE — 99999 PR PBB SHADOW E&M-EST. PATIENT-LVL IV: CPT | Mod: PBBFAC,,, | Performed by: OTOLARYNGOLOGY

## 2022-12-05 PROCEDURE — 72050 X-RAY EXAM NECK SPINE 4/5VWS: CPT | Mod: 26,,, | Performed by: RADIOLOGY

## 2022-12-05 PROCEDURE — 99213 OFFICE O/P EST LOW 20 MIN: CPT | Mod: S$PBB,25,, | Performed by: OTOLARYNGOLOGY

## 2022-12-05 PROCEDURE — 71046 X-RAY EXAM CHEST 2 VIEWS: CPT | Mod: 26,,, | Performed by: RADIOLOGY

## 2022-12-05 PROCEDURE — 99214 OFFICE O/P EST MOD 30 MIN: CPT | Mod: PBBFAC,25 | Performed by: OTOLARYNGOLOGY

## 2022-12-05 PROCEDURE — 71046 XR CHEST PA AND LATERAL: ICD-10-PCS | Mod: 26,,, | Performed by: RADIOLOGY

## 2022-12-05 PROCEDURE — 71046 X-RAY EXAM CHEST 2 VIEWS: CPT | Mod: TC

## 2022-12-05 NOTE — PROGRESS NOTES
CC: Cough  HPI:Ms. Lai ( [id number]) is an 89 year old CF who presents today for evaluation of cough, dyspnea and thick/mucopurulent expectoration.   She presents in a wheelchair with a younger female in attendance.  She was very recently diagnosed with viral URI/ progressive cough sx 11/28/22 by a [facility] urgent care  NP. She was screened for acute COVID and prescribed Tesselon perles.   She was then triaged by an on-call nurse 12/2/22 for severe breathing difficulty. Disposition to the ED was advised ( which never occurred).   One of the cough medications ( phenergan DM) made her too dry and she quit using it.   She continues to complain of significant cough and expectoration of significant mucous ( after she altered her type of cough) .    She gained an appointment with me today for evaluation of a presumed sinus/respiratory infection. She is now aware of my ENT specialization.    Past Medical History:   Diagnosis Date    Anxiety     Arthritis     Basal cell carcinoma 09/2016    right post auricular neck     Breast cancer 1992    right    Cataract     Fibromyalgia     History of measles as a child     Hyperlipidemia     Hypertension     Personal history of colonic polyps     Pneumonia     SCC (squamous cell carcinoma) 2015    R chest    SCC (squamous cell carcinoma) 2016    left medial shoulder    SCC (squamous cell carcinoma) 2017    left knee    SCC (squamous cell carcinoma) 2022    L upper chest, R upper arm KA types    Shingles     Squamous cell carcinoma 2015    right forearm    Thyroid disease     Vaginitis    ALL; sulfa, Clarithromycin, Flexeril, iodine, lisinopril, losartan, metoprolol, tramadol, verapamil, Voltaren    PE:Gen.: alert and oriented CF in no acute distress; she is slightly dyspneic especially after forceful coughing  T 99.6  /58 P 82  Mucopurulent expectorant cultured.  Chest is auscultated; there is tiny amount of wheezing in the right lung field ( anterior and posterior) with  slightly decreased breath sounds here versus the left side.  Ears: Some cerumen in canals per otoscopy; no obvious YINKA nor AOM  Nose:  There is no evidence of purulence in either nasal passage after Naveen-Synephrine decongestion of each.  Right nasal passage is obstructed anteriorly by septal deflection.  Lidocaine is sprayed in the left nasal passage x2 prior to endoscopy.  Throat:  No evidence of uvula swelling, or inflammation nor thrush.  There is no evidence of posterior pharyngitis.  Neck:  There is no evidence of significant anterior posterior cervical adenopathy.  The trachea is palpated in the midline.    The patient  consented to an endoscopic study today which was explained to her in detail.  Scope #  [id number]  was used.  Procedure:  The left nasal passage is used as the conduit for the study.  The turbinates are mildly inflamed.  There is no evidence of purulence in the left middle meatus.  The nasopharyngeal tissues are mildly inflamed.  There is no evidence of epiglottitis.  Patient has an excellent glottic airway.  There is slightly more prominence of the left false cord compared to the right but this finding may be due to scope angulation.  Base of the tongue appears within normal limits.  Right nasal passage was examined with the scope.  There is no evidence of purulence in the right nasal passage posteriorly.  The scope was withdrawn        DIAGNOSIS:     ICD-10-CM ICD-9-CM    1. Acute cough  R05.1 786.2 X-Ray Chest PA And Lateral      CULTURE, AEROBIC  (SPECIFY SOURCE)      2. Expectoration of abnormal sputum  R09.3 786.4 X-Ray Chest PA And Lateral      CULTURE, AEROBIC  (SPECIFY SOURCE)      3. Shortness of breath  R06.02 786.05 X-Ray Chest PA And Lateral      CULTURE, AEROBIC  (SPECIFY SOURCE)      4. Nasal septal deviation; right/anterior  J34.2 470         PLAN: Expectorant ( viscous yellow green mucous coughed up into Kleenex) cultured; call for results in 2-3 days  Hydration  encouraged  Continue use of Mucinex DM BID ; may add Tesselon ( Benzonatate) perles to regimen 10 mg # 30; take TID for cough  CXR ordered:  Chronic and stable findings in the chest  Will communicate with PCP Dr. Tomas Beltran's staff  ( [phone]) re: patient status  To ED for respiratory distress  Rx for Doxycycline 100 mg # 20; take BID for infection

## 2022-12-05 NOTE — TELEPHONE ENCOUNTER
Spoke with Dr Belle, pt being seen by him today. Chest xray ordered and sputum culture ordered. Pt to be started on doxycycline. Would like pt to follow up with this office this week.    Oriented - self; Oriented - place; Oriented - time

## 2022-12-05 NOTE — TELEPHONE ENCOUNTER
----- Message from Sharan Rush sent at 12/5/2022  9:37 AM CST -----  Contact: Dr Belle ENT/ [phone]  The patient is Short of Breath.     Thank you

## 2022-12-05 NOTE — PATIENT INSTRUCTIONS
Expectorant ( viscous yellow green mucous coughed up into Kleenex) cultured; call for results in 2-3 days  Hydration encouraged  Continue use of Mucinex DM BID ; may add Tesselon ( Benzonatate) perles to regimen 10 mg # 30; take TID for cough  CXR ordered; chronic and stable findings in the chest  Will communicate with PCP Dr. Tomas Beltran's staff  re: patient status  To ED for respiratory distress  Rx for Doxycycline 100 mg # 20; take BID for infection

## 2022-12-07 LAB — BACTERIA SPEC AEROBE CULT: NORMAL

## 2022-12-09 ENCOUNTER — HOSPITAL ENCOUNTER (OUTPATIENT)
Dept: RADIOLOGY | Facility: HOSPITAL | Age: 87
Discharge: HOME | End: 2022-12-09
Attending: ORTHOPAEDIC SURGERY
Payer: MEDICARE

## 2022-12-09 ENCOUNTER — PATIENT MESSAGE (OUTPATIENT)
Dept: INTERNAL MEDICINE | Facility: CLINIC | Age: 87
End: 2022-12-09

## 2022-12-09 ENCOUNTER — HOSPITAL ENCOUNTER (OUTPATIENT)
Dept: RADIOLOGY | Facility: HOSPITAL | Age: 87
Discharge: HOME | End: 2022-12-09
Attending: INTERNAL MEDICINE
Payer: MEDICARE

## 2022-12-09 ENCOUNTER — TELEPHONE (OUTPATIENT)
Dept: INTERNAL MEDICINE | Facility: CLINIC | Age: 87
End: 2022-12-09

## 2022-12-09 ENCOUNTER — OFFICE VISIT (OUTPATIENT)
Dept: INTERNAL MEDICINE | Facility: CLINIC | Age: 87
End: 2022-12-09
Payer: MEDICARE

## 2022-12-09 VITALS
BODY MASS INDEX: 24.38 KG/M2 | DIASTOLIC BLOOD PRESSURE: 60 MMHG | OXYGEN SATURATION: 95 % | HEART RATE: 72 BPM | RESPIRATION RATE: 17 BRPM | WEIGHT: 137.56 LBS | HEIGHT: 63 IN | TEMPERATURE: 97 F | SYSTOLIC BLOOD PRESSURE: 120 MMHG

## 2022-12-09 DIAGNOSIS — R05.9 COUGH, UNSPECIFIED TYPE: ICD-10-CM

## 2022-12-09 DIAGNOSIS — R06.02 SOB (SHORTNESS OF BREATH): ICD-10-CM

## 2022-12-09 DIAGNOSIS — R05.9 COUGH, UNSPECIFIED TYPE: Primary | ICD-10-CM

## 2022-12-09 DIAGNOSIS — M79.642 LEFT HAND PAIN: ICD-10-CM

## 2022-12-09 PROCEDURE — 71046 X-RAY EXAM CHEST 2 VIEWS: CPT | Mod: TC,PO

## 2022-12-09 PROCEDURE — 71046 X-RAY EXAM CHEST 2 VIEWS: CPT | Mod: 26,,, | Performed by: RADIOLOGY

## 2022-12-09 PROCEDURE — 71046 XR CHEST PA AND LATERAL: ICD-10-PCS | Mod: 26,,, | Performed by: RADIOLOGY

## 2022-12-09 PROCEDURE — 99215 OFFICE O/P EST HI 40 MIN: CPT | Mod: PBBFAC,PO,25 | Performed by: INTERNAL MEDICINE

## 2022-12-09 PROCEDURE — 99214 OFFICE O/P EST MOD 30 MIN: CPT | Mod: S$PBB,,, | Performed by: INTERNAL MEDICINE

## 2022-12-09 PROCEDURE — 99999 PR PBB SHADOW E&M-EST. PATIENT-LVL V: ICD-10-PCS | Mod: PBBFAC,,, | Performed by: INTERNAL MEDICINE

## 2022-12-09 PROCEDURE — 99999 PR PBB SHADOW E&M-EST. PATIENT-LVL V: CPT | Mod: PBBFAC,,, | Performed by: INTERNAL MEDICINE

## 2022-12-09 PROCEDURE — 99214 PR OFFICE/OUTPT VISIT, EST, LEVL IV, 30-39 MIN: ICD-10-PCS | Mod: S$PBB,,, | Performed by: INTERNAL MEDICINE

## 2022-12-09 RX ORDER — ALBUTEROL SULFATE 90 UG/1
1-2 AEROSOL, METERED RESPIRATORY (INHALATION) EVERY 6 HOURS PRN
Qty: 18 G | Refills: 0 | Status: SHIPPED | OUTPATIENT
Start: 2022-12-09 | End: 2022-12-31

## 2022-12-09 RX ORDER — DOXYCYCLINE 100 MG/1
100 CAPSULE ORAL 2 TIMES DAILY
COMMUNITY
Start: 2022-12-05 | End: 2022-12-09

## 2022-12-09 RX ORDER — LEVOFLOXACIN 500 MG/1
500 TABLET, FILM COATED ORAL DAILY
Qty: 7 TABLET | Refills: 0 | Status: SHIPPED | OUTPATIENT
Start: 2022-12-09 | End: 2023-04-25

## 2022-12-09 NOTE — PROGRESS NOTES
Subjective:       Patient ID: Gerda Lai is a 89 y.o. female.    Chief Complaint: Follow-up    HPI    89-year-old female here for follow-up of cough, congestion, sputum cultures.  This has been going on the last 12 days.  She reports that this started the Sunday after Thanksgiving. She reports that she was feeling bad and was coughing up thick, green mucus.  She had fever at night, but not during the day.  She had not taken her temperature.  She coughs a lot.  She is weak because she has not eaten much - does not feel like eating much though.  This am, she ate part of a scone.  She has sores on her head. She has been in bed more than she usually is.  She has put neosporin on it.  She has slight SOB with coughing.  She went to  and was given tessalon perrles and phenergan DM (this dried her mouth out).  She is doing a lot better now than she was.  She can take a shower on her own and ambulate on her own.    Review of Systems      Objective:      Physical Exam  Vitals reviewed.   Constitutional:       Appearance: She is well-developed.   HENT:      Head: Normocephalic and atraumatic.      Mouth/Throat:      Pharynx: No oropharyngeal exudate.   Eyes:      General: No scleral icterus.        Right eye: No discharge.         Left eye: No discharge.      Pupils: Pupils are equal, round, and reactive to light.   Neck:      Thyroid: No thyromegaly.      Trachea: No tracheal deviation.   Cardiovascular:      Rate and Rhythm: Normal rate and regular rhythm.      Heart sounds: Normal heart sounds. No murmur heard.    No friction rub. No gallop.   Pulmonary:      Effort: Pulmonary effort is normal. No respiratory distress.      Breath sounds: Wheezing (upper lobes) present. No rales.   Chest:      Chest wall: No tenderness.   Abdominal:      General: Bowel sounds are normal. There is no distension.      Palpations: Abdomen is soft. There is no mass.      Tenderness: There is no abdominal tenderness. There is no guarding  or rebound.   Musculoskeletal:         General: No tenderness. Normal range of motion.      Cervical back: Normal range of motion and neck supple.   Skin:     General: Skin is warm and dry.      Coloration: Skin is not pale.      Findings: No erythema or rash.   Neurological:      Mental Status: She is alert and oriented to person, place, and time.   Psychiatric:         Behavior: Behavior normal.       Assessment:       1. Cough, unspecified type  - X-Ray Chest PA And Lateral; Future    2. SOB (shortness of breath)  - X-Ray Chest PA And Lateral; Future    Plan:       1/2.  Check chest x-ray.  Finish antibiotics as prescribed.  Albuterol prescribed.

## 2022-12-14 ENCOUNTER — TELEPHONE (OUTPATIENT)
Dept: ORTHOPEDICS | Facility: CLINIC | Age: 87
End: 2022-12-14
Payer: MEDICARE

## 2022-12-14 NOTE — TELEPHONE ENCOUNTER
Called and spoke with the pt.pt informed she got infected little finger for past 1 week and was on Antibiotics. Pt asks for sooner appointment with . Informed her  advised her to be seen sooner within 2 days and informed her that we don't have any appointments for these 2 days. Also informed that we schedule with one of the PA's for her finger in these 2 days.pt would like to schedule with the PA and would like to go to [facility].

## 2022-12-14 NOTE — TELEPHONE ENCOUNTER
----- Message from Barbara Aldridge MD sent at 12/14/2022  3:34 PM CST -----  Regarding: RE: appt  Contact: [phone]  Can you call the patient and ask more about the history of what is going on with the finger? I am fully booked but she could see one of the Pas, Temitope or Yasmin or if it is urgent, go to the ER.    Thank you  ----- Message -----  From: Eva Coyle MA  Sent: 12/14/2022   2:03 PM CST  To: Barbara Aldridge MD  Subject: FW: appt                                         Please advise  ----- Message -----  From: Kell Aguilera  Sent: 12/14/2022   1:52 PM CST  To: [facility] Staff  Subject: appt                                             Pt has infection and pain in right little finger joint. Attempted to schedule, earliest was Jan. Pt needs to be seen sooner then that. [facility] Pls call

## 2022-12-15 ENCOUNTER — OFFICE VISIT (OUTPATIENT)
Dept: ORTHOPEDICS | Facility: CLINIC | Age: 87
End: 2022-12-15
Payer: MEDICARE

## 2022-12-15 ENCOUNTER — HOSPITAL ENCOUNTER (OUTPATIENT)
Dept: RADIOLOGY | Facility: HOSPITAL | Age: 87
Discharge: HOME | End: 2022-12-15
Attending: PHYSICIAN ASSISTANT
Payer: MEDICARE

## 2022-12-15 DIAGNOSIS — M79.641 BILATERAL HAND PAIN: Primary | ICD-10-CM

## 2022-12-15 DIAGNOSIS — M79.642 BILATERAL HAND PAIN: ICD-10-CM

## 2022-12-15 DIAGNOSIS — M79.642 LEFT HAND PAIN: Primary | ICD-10-CM

## 2022-12-15 DIAGNOSIS — M79.641 BILATERAL HAND PAIN: ICD-10-CM

## 2022-12-15 DIAGNOSIS — R22.31 FINGER MASS, RIGHT: Primary | ICD-10-CM

## 2022-12-15 DIAGNOSIS — M79.642 BILATERAL HAND PAIN: Primary | ICD-10-CM

## 2022-12-15 PROCEDURE — 99999 PR PBB SHADOW E&M-EST. PATIENT-LVL III: CPT | Mod: PBBFAC,,, | Performed by: PHYSICIAN ASSISTANT

## 2022-12-15 PROCEDURE — 99213 PR OFFICE/OUTPT VISIT, EST, LEVL III, 20-29 MIN: ICD-10-PCS | Mod: S$PBB,,, | Performed by: PHYSICIAN ASSISTANT

## 2022-12-15 PROCEDURE — 73130 XR HAND COMPLETE 3 VIEWS BILATERAL: ICD-10-PCS | Mod: 26,50,, | Performed by: RADIOLOGY

## 2022-12-15 PROCEDURE — 99999 PR PBB SHADOW E&M-EST. PATIENT-LVL III: ICD-10-PCS | Mod: PBBFAC,,, | Performed by: PHYSICIAN ASSISTANT

## 2022-12-15 PROCEDURE — 99213 OFFICE O/P EST LOW 20 MIN: CPT | Mod: S$PBB,,, | Performed by: PHYSICIAN ASSISTANT

## 2022-12-15 PROCEDURE — 99213 OFFICE O/P EST LOW 20 MIN: CPT | Mod: PBBFAC,PN | Performed by: PHYSICIAN ASSISTANT

## 2022-12-15 PROCEDURE — 73130 X-RAY EXAM OF HAND: CPT | Mod: TC,50,PN

## 2022-12-15 PROCEDURE — 73130 X-RAY EXAM OF HAND: CPT | Mod: 26,50,, | Performed by: RADIOLOGY

## 2022-12-15 NOTE — PROGRESS NOTES
DATE: 12/21/2022  PATIENT: Gerda Lai    Supervising Physician: Hardy eKys M.D.    CHIEF COMPLAINT: neck pain    HISTORY:  Gerda Lai is a 89 y.o. female with pmhx of left TKA, osteopenia, CKD3, and HTN here for initial evaluation of neck and bilateral arm pain (Neck - 2, Arm - 1). The pain has been present for years. The patient describes the pain as dull. The pain is worse with in the morning and improved by time. There is associated numbness and tingling usually in the morning in her bilateral hands. There is subjective weakness. The patient was diagnosed with a right rotator cuff tear in 2019. Prior treatments have included tylenol and MBB x 3 with Dr. Blackmon with minimal relief, but no PT HEIDI or surgery.     The patient denies myelopathic symptoms such as handwriting changes or difficulty with buttons/coins/keys. Denies perineal paresthesias, bowel/bladder dysfunction.    PAST MEDICAL/SURGICAL HISTORY:  Past Medical History:   Diagnosis Date    Anxiety     Arthritis     Basal cell carcinoma 09/2016    right post auricular neck     Breast cancer 1992    right    Cataract     Fibromyalgia     History of measles as a child     Hyperlipidemia     Hypertension     Personal history of colonic polyps     Pneumonia     SCC (squamous cell carcinoma) 2015    R chest    SCC (squamous cell carcinoma) 2016    left medial shoulder    SCC (squamous cell carcinoma) 2017    left knee    SCC (squamous cell carcinoma) 2022    L upper chest, R upper arm KA types    Shingles     Squamous cell carcinoma 2015    right forearm    Thyroid disease     Vaginitis      Past Surgical History:   Procedure Laterality Date    ADENOIDECTOMY      BREAST BIOPSY Left     Excisional bx, benign    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    CARPAL TUNNEL RELEASE Right 3/16/2021    Procedure: RELEASE, CARPAL TUNNEL;  Surgeon: Barbara Aldridge MD;  Location: [facility];  Service: Orthopedics;  Laterality: Right;    CATARACT EXTRACTION W/   INTRAOCULAR LENS IMPLANT Bilateral     EYE SURGERY      HAND SURGERY      INJECTION OF ANESTHETIC AGENT AROUND MEDIAL BRANCH NERVES INNERVATING CERVICAL FACET JOINT N/A 1/4/2022    Procedure: Cervical Medial Branch Block C5-6, C6-7 (No Sedation);  Surgeon: Himanshu Blackmon Jr., MD;  Location: [facility];  Service: Pain Management;  Laterality: N/A;    INJECTION OF ANESTHETIC AGENT AROUND MEDIAL BRANCH NERVES INNERVATING LUMBAR FACET JOINT Left 11/18/2021    Procedure: Cervical Medial Branch Block Left C5-6, C6-7 (IV Sedation);  Surgeon: Himanshu Blackmon Jr., MD;  Location: [facility];  Service: Pain Management;  Laterality: Left;    JOINT REPLACEMENT Right     ELZBIETA    KNEE ARTHROPLASTY Left 8/10/2020    Procedure: ARTHROPLASTY, KNEE-ADE NEXGEN/CEMENTED TIBIA;  Surgeon: Kalin Pacheco MD;  Location: [facility] OR;  Service: Orthopedics;  Laterality: Left;    RADIOFREQUENCY THERMAL COAGULATION OF MEDIAL BRANCH OF POSTERIOR RAMUS OF CERVICAL SPINAL NERVE Left 3/8/2022    Procedure: RADIOFREQUENCY THERMAL COAGULATION, NERVE, SPINAL, CERVICAL, LEFT C5-6 AND C6-7;  Surgeon: Himanshu Blackmon Jr., MD;  Location: [facility];  Service: Pain Management;  Laterality: Left;  NO PACEMAKER   ASA    thyriodectomy      partial    tonsillectomy      TONSILLECTOMY      TOTAL HIP ARTHROPLASTY      right    Yag  Left        Medications:  Current Outpatient Medications on File Prior to Visit   Medication Sig Dispense Refill    acetaminophen (TYLENOL) 650 MG TbSR Take 1 tablet (650 mg total) by mouth every 8 (eight) hours as needed. 120 tablet 0    albuterol (PROVENTIL/VENTOLIN HFA) 90 mcg/actuation inhaler Inhale 1-2 puffs into the lungs every 6 (six) hours as needed for Wheezing. 18 g 0    aliskiren (TEKTURNA) 300 MG Tab TAKE 1 TABLET BY MOUTH EVERY DAY 90 tablet 1    aspirin (ECOTRIN) 81 MG EC tablet TAKE 1 TABLET BY MOUTH EVERY DAY 90 tablet 3    coenzyme Q10 100 mg capsule Take 100 mg by mouth once daily.      doxazosin  (CARDURA) 1 MG tablet Take 1 mg by mouth once daily.      felodipine (PLENDIL) 10 MG 24 hr tablet Take 1 tablet (10 mg total) by mouth once daily. 90 tablet 3    glucosamine-chondroitin 500-400 mg tablet Take 1 tablet by mouth once daily.       labetaloL (NORMODYNE) 100 MG tablet Take 100 mg by mouth 2 (two) times daily.      lactase (LACTAID) 3,000 unit tablet Take 1 tablet by mouth 3 (three) times daily as needed.      levoFLOXacin (LEVAQUIN) 500 MG tablet Take 1 tablet (500 mg total) by mouth once daily. 7 tablet 0    LORazepam (ATIVAN) 0.5 MG tablet TAKE 1/2 TABLET TO 1 TABLET BY MOUTH EVERY 12 HOURS AS NEEDED FOR ANXIETY 30 tablet 0    multivitamin capsule Take 1 capsule by mouth once daily.      omeprazole (PRILOSEC OTC) 20 MG tablet Take 20 mg by mouth once daily.      polymyxin B sulf-trimethoprim (POLYTRIM) 10,000 unit- 1 mg/mL Drop Place 1 drop into both eyes every 6 (six) hours. 10 mL 0    pravastatin (PRAVACHOL) 80 MG tablet Take 1 tablet (80 mg total) by mouth once daily. 90 tablet 3    TURMERIC ORAL Take 500 mg by mouth once daily.       vitamin D (VITAMIN D3) 1000 units Tab Take 1,000 Units by mouth once daily.       No current facility-administered medications on file prior to visit.       Social History:   Social History     Socioeconomic History    Marital status: Single   Occupational History     Employer: RETIRED   Tobacco Use    Smoking status: Never     Passive exposure: Never    Smokeless tobacco: Never   Substance and Sexual Activity    Alcohol use: Yes     Comment: socially - celebrations only    Drug use: Never    Sexual activity: Not Currently     Social Determinants of Health     Financial Resource Strain: Low Risk     Difficulty of Paying Living Expenses: Not hard at all   Food Insecurity: No Food Insecurity    Worried About Running Out of Food in the Last Year: Never true    Ran Out of Food in the Last Year: Never true   Transportation Needs: No Transportation Needs    Lack of  "Transportation (Medical): No    Lack of Transportation (Non-Medical): No   Stress: Stress Concern Present    Feeling of Stress : To some extent   Housing Stability: Unknown    Unable to Pay for Housing in the Last Year: No    Unstable Housing in the Last Year: No       REVIEW OF SYSTEMS:  Constitution: Negative. Negative for chills, fever and night sweats.   Cardiovascular: Negative for chest pain and syncope.   Respiratory: Negative for cough and shortness of breath.   Gastrointestinal: See HPI. Negative for nausea/vomiting. Negative for abdominal pain.  Genitourinary: See HPI. Negative for discoloration or dysuria.  Skin: Negative for dry skin, itching and rash.   Hematologic/Lymphatic: Negative for bleeding problem. Does not bruise/bleed easily.   Musculoskeletal: Negative for falls and muscle weakness.   Neurological: See HPI. No seizures.   Endocrine: Negative for polydipsia, polyphagia and polyuria.   Allergic/Immunologic: Negative for hives and persistent infections.  Psychiatric/Behavioral: Negative for depression and insomnia.         EXAM:  Ht 5' 3" (1.6 m)   Wt 61.7 kg (136 lb 2.1 oz)   BMI 24.12 kg/m²     General: The patient is a very pleasnt 89 y.o. female in no apparent distress, the patient is oriented to person, place and time.  Psych: Normal mood and affect  HEENT: Vision grossly intact, hearing intact to the spoken word.  Lungs: Respirations unlabored.  Gait: Normal station and gait, no difficulty with toe or heel walk.   Skin: Cervical skin negative for rashes, lesions, hairy patches and surgical scars.  Range of motion: Cervical range of motion is acceptable. There is mild tenderness to palpation.  Spinal Balance: Global saggital and coronal spinal balance acceptable, no significant for scoliosis and kyphosis.  Musculoskeletal: No pain with the range of motion of the bilateral shoulders and elbows. Normal bulk and contour of the bilateral hands.  Vascular: Bilateral hands warm and well " perfused, radial pulses 2+ bilaterally.  Neurological: Normal strength and tone in all major motor groups in the bilateral upper and lower extremities. Normal sensation to light touch in the C5-T1 and L2-S1 dermatomes bilaterally.  Deep tendon reflexes symmetric 2+ in the bilateral upper and lower extremities.  Negative Inverted Radial Reflex and Hicks's bilaterally. Negative Babinski bilaterally.     IMAGING:   Today I personally reviewed AP, Lat and Flex/Ex  upright C-spine films that demonstrate severe DDD.      Body mass index is 24.12 kg/m².    Hemoglobin A1C   Date Value Ref Range Status   05/27/2011 5.5 4.0 - 6.2 % Final           ASSESSMENT/PLAN:    Gerda was seen today for neck pain.    Diagnoses and all orders for this visit:    Cervicalgia  -     MRI Cervical Spine Without Contrast; Future    Chronic pain of right knee  -     X-ray Knee Ortho Bilateral with Flexion; Future      Today we discussed at length all of the different treatment options including anti-inflammatories, acetaminophen, rest, ice, heat, physical therapy including strengthening and stretching exercises, home exercises, ROM, aerobic conditioning, aqua therapy, other modalities including ultrasound, massage, and dry needling, epidural steroid injections and finally surgical intervention.  Cervical MRI ordered, I will call with results and schedule the patient in the clinic with Aaaron for neck pain.   The patient will follow up in the clinic with orthopedics for right knee pain.     I provided the patient with a home exercise program. It is the [facility] spine conditioning program. Exercises include head rolls, kneeling back extension, sitting rotation stretch, modified seated side straddle, knee to chest, bird dog, plank, modified seated plank, hip bridges, abdominal bracing, and abdominal crunch. Pt will complete each exercise 5 times daily for 6-8 weeks.

## 2022-12-15 NOTE — PROGRESS NOTES
[facility]  History & Physical  Orthopedics    SUBJECTIVE:      Chief Complaint: right small finger    Referring Provider: No ref. provider found     Dr. Yang is the supervising physician for this encounter/patient    History of Present Illness:  Patient is a 89 y.o. right hand dominant female who presents today with concerns of infection of the right small finger. Patient of Dr. Aldridge, she was seen on 11/23/22 for left small finger PIP arthritis and treated with steroid injection. Prior history of right open carpal tunnel release with Dr. Aldridge as well. She tells me that a few weeks ago a collection at the right small DIP formed, this became painful a few days ago and this morning it did burst, leaking white chalky material.     Onset of symptoms/DOI was weeks.    Symptoms are aggravated by activity.    Symptoms are alleviated by  expressing of the discharge .    Symptoms consist of  mass right small finger .    The patient rates their pain as a 0/10.    Attempted treatment(s) and/or interventions include activity modifications, rest.     The patient denies any fevers, chills, N/V, D/C and presents for evaluation.       Past Medical History:   Diagnosis Date    Anxiety     Arthritis     Basal cell carcinoma 09/2016    right post auricular neck     Breast cancer 1992    right    Cataract     Fibromyalgia     History of measles as a child     Hyperlipidemia     Hypertension     Personal history of colonic polyps     Pneumonia     SCC (squamous cell carcinoma) 2015    R chest    SCC (squamous cell carcinoma) 2016    left medial shoulder    SCC (squamous cell carcinoma) 2017    left knee    SCC (squamous cell carcinoma) 2022    L upper chest, R upper arm KA types    Shingles     Squamous cell carcinoma 2015    right forearm    Thyroid disease     Vaginitis      Past Surgical History:   Procedure Laterality Date    ADENOIDECTOMY      BREAST BIOPSY Left     Excisional bx, benign    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    CARPAL TUNNEL RELEASE Right 3/16/2021    Procedure: RELEASE, CARPAL TUNNEL;  Surgeon: Barbara Aldridge MD;  Location: [facility] OR;  Service: Orthopedics;  Laterality: Right;    CATARACT EXTRACTION W/  INTRAOCULAR LENS IMPLANT Bilateral     EYE SURGERY      HAND SURGERY      INJECTION OF ANESTHETIC AGENT AROUND MEDIAL BRANCH NERVES INNERVATING CERVICAL FACET JOINT N/A 1/4/2022    Procedure: Cervical Medial Branch Block C5-6, C6-7 (No Sedation);  Surgeon: Himanshu Blackmon Jr., MD;  Location: [facility]IN [facility]GT;  Service: Pain Management;  Laterality: N/A;    INJECTION OF ANESTHETIC AGENT AROUND MEDIAL BRANCH NERVES INNERVATING LUMBAR FACET JOINT Left 11/18/2021    Procedure: Cervical Medial Branch Block Left C5-6, C6-7 (IV Sedation);  Surgeon: Himanshu Blackmon Jr., MD;  Location: [facility];  Service: Pain Management;  Laterality: Left;    JOINT REPLACEMENT Right     ELZBIETA    KNEE ARTHROPLASTY Left 8/10/2020    Procedure: ARTHROPLASTY, KNEE-ADE NEXGEN/CEMENTED TIBIA;  Surgeon: Kalin Pacheco MD;  Location: [facility] OR;  Service: Orthopedics;  Laterality: Left;    RADIOFREQUENCY THERMAL COAGULATION OF MEDIAL BRANCH OF POSTERIOR RAMUS OF CERVICAL SPINAL NERVE Left 3/8/2022    Procedure: RADIOFREQUENCY THERMAL COAGULATION, NERVE, SPINAL, CERVICAL, LEFT C5-6 AND C6-7;  Surgeon: Himanshu Blackmon Jr., MD;  Location: [facility]IN [facility]GT;  Service: Pain Management;  Laterality: Left;  NO PACEMAKER   ASA    thyriodectomy      partial    tonsillectomy      TONSILLECTOMY      TOTAL HIP ARTHROPLASTY      right    Yag  Left      Review of patient's allergies indicates:   Allergen Reactions    Sulfa (sulfonamide antibiotics) Rash    Clarithromycin Other (See Comments)     Weak, extreme fatigue. dizziness    Flexeril [cyclobenzaprine] Other (See Comments)     Dizziness    Iodine and iodide containing products     Lisinopril Other (See Comments)     Cough and sensation of throat swelling/?angioedema    Losartan  Rash    Metoprolol Swelling     Tightness in throat    Tramadol Other (See Comments)     Dizzy and weak    Verapamil (bulk) Palpitations    Voltaren [diclofenac sodium] Other (See Comments)     Drops blood pressure     Social History     Social History Narrative    Not on file     Family History   Problem Relation Age of Onset    Breast cancer Mother     Cancer Mother     Stroke Paternal Grandfather     Heart disease Father     Cancer Paternal Aunt     Heart disease Paternal Aunt     Glaucoma Paternal Grandmother     Amblyopia Neg Hx     Blindness Neg Hx     Cataracts Neg Hx     Diabetes Neg Hx     Hypertension Neg Hx     Macular degeneration Neg Hx     Retinal detachment Neg Hx     Strabismus Neg Hx     Thyroid disease Neg Hx     Melanoma Neg Hx          Current Outpatient Medications:     acetaminophen (TYLENOL) 650 MG TbSR, Take 1 tablet (650 mg total) by mouth every 8 (eight) hours as needed., Disp: 120 tablet, Rfl: 0    albuterol (PROVENTIL/VENTOLIN HFA) 90 mcg/actuation inhaler, Inhale 1-2 puffs into the lungs every 6 (six) hours as needed for Wheezing., Disp: 18 g, Rfl: 0    aliskiren (TEKTURNA) 300 MG Tab, TAKE 1 TABLET BY MOUTH EVERY DAY, Disp: 90 tablet, Rfl: 1    aspirin (ECOTRIN) 81 MG EC tablet, TAKE 1 TABLET BY MOUTH EVERY DAY, Disp: 90 tablet, Rfl: 3    coenzyme Q10 100 mg capsule, Take 100 mg by mouth once daily., Disp: , Rfl:     doxazosin (CARDURA) 1 MG tablet, Take 1 mg by mouth once daily., Disp: , Rfl:     felodipine (PLENDIL) 10 MG 24 hr tablet, Take 1 tablet (10 mg total) by mouth once daily., Disp: 90 tablet, Rfl: 3    glucosamine-chondroitin 500-400 mg tablet, Take 1 tablet by mouth once daily. , Disp: , Rfl:     labetaloL (NORMODYNE) 100 MG tablet, Take 100 mg by mouth 2 (two) times daily., Disp: , Rfl:     lactase (LACTAID) 3,000 unit tablet, Take 1 tablet by mouth 3 (three) times daily as needed., Disp: , Rfl:     levoFLOXacin (LEVAQUIN) 500 MG tablet, Take 1 tablet (500 mg total) by  mouth once daily., Disp: 7 tablet, Rfl: 0    LORazepam (ATIVAN) 0.5 MG tablet, TAKE 1/2 TABLET TO 1 TABLET BY MOUTH EVERY 12 HOURS AS NEEDED FOR ANXIETY, Disp: 30 tablet, Rfl: 0    multivitamin capsule, Take 1 capsule by mouth once daily., Disp: , Rfl:     omeprazole (PRILOSEC OTC) 20 MG tablet, Take 20 mg by mouth once daily., Disp: , Rfl:     polymyxin B sulf-trimethoprim (POLYTRIM) 10,000 unit- 1 mg/mL Drop, Place 1 drop into both eyes every 6 (six) hours., Disp: 10 mL, Rfl: 0    pravastatin (PRAVACHOL) 80 MG tablet, Take 1 tablet (80 mg total) by mouth once daily., Disp: 90 tablet, Rfl: 3    TURMERIC ORAL, Take 500 mg by mouth once daily. , Disp: , Rfl:     vitamin D (VITAMIN D3) 1000 units Tab, Take 1,000 Units by mouth once daily., Disp: , Rfl:       Review of Systems:  Constitutional: no fever or chills  Eyes: no visual changes  ENT: no nasal congestion or sore throat  Respiratory: no cough or shortness of breath  Cardiovascular: no chest pain  Gastrointestinal: no nausea or vomiting, tolerating diet  Musculoskeletal: soreness and mass    OBJECTIVE:      Vital Signs (Most Recent):  There were no vitals filed for this visit.  There is no height or weight on file to calculate BMI.      Physical Exam:  Constitutional: The patient appears well-developed and well-nourished. No distress.   Skin: No lesions appreciated  Head: Normocephalic and atraumatic.   Nose: Nose normal.   Ears: No deformities seen  Eyes: Conjunctivae and EOM are normal.   Neck: No tracheal deviation present.   Cardiovascular: Normal rate and intact distal pulses.    Pulmonary/Chest: Effort normal. No respiratory distress.   Abdominal: There is no guarding.   Neurological: The patient is alert.   Psychiatric: The patient has a normal mood and affect.     Right Hand/Wrist Examination:    Observation/Inspection:  Swelling  none    Deformity  Multiple degenerative joints  Discoloration  none     Scars   none    Atrophy  none  Notable collection of  white material to the ulnar boarder of the small finger DIP. No surrounding erythema.    HAND/WRIST EXAMINATION:  Finkelstein's Test   Neg  WHAT Test    Neg  Snuff box tenderness   Neg  Barbosa's Test    Neg  Hook of Hamate Tenderness  Neg  CMC grind    Neg  Circumduction test   Neg  Upon exam, I am able to express white chalky material without pain. No pus like discharge/drainage.    Neurovascular Exam:  Digits WWP, brisk CR < 3s throughout  NVI motor/LTS to M/R/U nerves, radial pulse 2+  Tinel's Test - Carpal Tunnel  Neg  Tinel's Test - Cubital Tunnel  Neg  Phalen's Test    Neg  Median Nerve Compression Test Neg    ROM hand full, painless    ROM wrist full, painless    ROM elbow full, painless    Abdomen not guarded  Respirations nonlabored  Perfusion intact    Diagnostic Results:     Imaging - I independently viewed the patient's imaging as well as the radiology report.  In comparing the images of the right small finger take today to images on 11/23/22, there is less calcium deposits/collection noted today at the right small DIP    FINDINGS:  Severe degenerative changes seen at multiple interphalangeal joints particularly the distal interphalangeal joints and the 1st carpal metacarpal joint space bilaterally.  Appearance is similar to exam of a few weeks ago.  No acute fractures are identified.     Impression:     See above    11/23/22  FINDINGS:  Right hand:     No acute fractures.  Redemonstrated and not felt significantly changed variable degrees of osteoarthritic changes involving the radiocarpal, scaphoid trapezium, 1st carpal metacarpal, 1st MCP, thumb IP, and 2nd through 5th finger DIP more so than PIP articulations except about the 5th finger where they appear similar.  Reconfirmed and slightly larger focus of soft tissue calcification to the ulnar side of the distal middle phalanx and DIP articulation of the 5th finger.  Stable small focus of periarticular soft tissue calcification to the ulna or side of  the 4th finger PIP articulation.  Some soft tissue swelling seen dorsally at the level of the MCP articulations.  Again, right hand findings do not appear significantly changed to earlier exam.    ASSESSMENT/PLAN:      89 y.o. yo female with Right small DIP calcium collection    Plan: The patient and I had a thorough discussion today.  We discussed the working diagnosis as well as several other potential alternative diagnoses.  I do clean the finger and express calcium from the collection, there are no signs of infection. Advise warm compresses, keep clean and dry. We discuss signs of infection, should any occur, she is instructed to go to the [facility] for ortho to evaluate. F/u with Dr. Aldridge as needed.    Should the patient's symptoms worsen, persist, or fail to improve they should return for reevaluation and I would be happy to see them back anytime.           Please do not hesitate to reach out to us via email, phone, or MyChart with any questions, concerns, or feedback.

## 2022-12-20 DIAGNOSIS — I10 HYPERTENSION, ESSENTIAL: Chronic | ICD-10-CM

## 2022-12-20 RX ORDER — LORAZEPAM 0.5 MG/1
TABLET ORAL
Qty: 30 TABLET | Refills: 0 | Status: SHIPPED | OUTPATIENT
Start: 2022-12-20 | End: 2023-03-04

## 2022-12-21 ENCOUNTER — HOSPITAL ENCOUNTER (OUTPATIENT)
Dept: RADIOLOGY | Facility: HOSPITAL | Age: 87
Discharge: HOME | End: 2022-12-21
Attending: NURSE PRACTITIONER
Payer: MEDICARE

## 2022-12-21 ENCOUNTER — OFFICE VISIT (OUTPATIENT)
Dept: ORTHOPEDICS | Facility: CLINIC | Age: 87
End: 2022-12-21
Payer: MEDICARE

## 2022-12-21 VITALS — BODY MASS INDEX: 24.12 KG/M2 | WEIGHT: 136.13 LBS | HEIGHT: 63 IN

## 2022-12-21 DIAGNOSIS — M25.561 CHRONIC PAIN OF RIGHT KNEE: ICD-10-CM

## 2022-12-21 DIAGNOSIS — M25.561 PAIN IN BOTH KNEES, UNSPECIFIED CHRONICITY: Primary | ICD-10-CM

## 2022-12-21 DIAGNOSIS — M25.561 PAIN IN BOTH KNEES, UNSPECIFIED CHRONICITY: ICD-10-CM

## 2022-12-21 DIAGNOSIS — M25.562 PAIN IN BOTH KNEES, UNSPECIFIED CHRONICITY: Primary | ICD-10-CM

## 2022-12-21 DIAGNOSIS — M17.11 PRIMARY OSTEOARTHRITIS OF RIGHT KNEE: ICD-10-CM

## 2022-12-21 DIAGNOSIS — M25.511 CHRONIC RIGHT SHOULDER PAIN: ICD-10-CM

## 2022-12-21 DIAGNOSIS — G89.29 CHRONIC RIGHT SHOULDER PAIN: ICD-10-CM

## 2022-12-21 DIAGNOSIS — M25.562 PAIN IN BOTH KNEES, UNSPECIFIED CHRONICITY: ICD-10-CM

## 2022-12-21 DIAGNOSIS — G89.29 CHRONIC PAIN OF RIGHT KNEE: ICD-10-CM

## 2022-12-21 DIAGNOSIS — M54.2 CERVICALGIA: Primary | ICD-10-CM

## 2022-12-21 PROCEDURE — 99999 PR PBB SHADOW E&M-EST. PATIENT-LVL III: CPT | Mod: PBBFAC,,, | Performed by: NURSE PRACTITIONER

## 2022-12-21 PROCEDURE — 99999 PR PBB SHADOW E&M-EST. PATIENT-LVL III: ICD-10-PCS | Mod: PBBFAC,,, | Performed by: NURSE PRACTITIONER

## 2022-12-21 PROCEDURE — 99213 OFFICE O/P EST LOW 20 MIN: CPT | Mod: S$PBB,25,, | Performed by: NURSE PRACTITIONER

## 2022-12-21 PROCEDURE — 73562 X-RAY EXAM OF KNEE 3: CPT | Mod: TC,50

## 2022-12-21 PROCEDURE — 20610 DRAIN/INJ JOINT/BURSA W/O US: CPT | Mod: S$PBB,RT,, | Performed by: NURSE PRACTITIONER

## 2022-12-21 PROCEDURE — 99213 PR OFFICE/OUTPT VISIT, EST, LEVL III, 20-29 MIN: ICD-10-PCS | Mod: S$PBB,25,, | Performed by: NURSE PRACTITIONER

## 2022-12-21 PROCEDURE — 20610 DRAIN/INJ JOINT/BURSA W/O US: CPT | Mod: PBBFAC,RT | Performed by: NURSE PRACTITIONER

## 2022-12-21 PROCEDURE — 99999 PR PBB SHADOW E&M-EST. PATIENT-LVL III: CPT | Mod: PBBFAC,,, | Performed by: REGISTERED NURSE

## 2022-12-21 PROCEDURE — 20610 PR DRAIN/INJECT LARGE JOINT/BURSA: ICD-10-PCS | Mod: S$PBB,RT,, | Performed by: NURSE PRACTITIONER

## 2022-12-21 PROCEDURE — 99213 OFFICE O/P EST LOW 20 MIN: CPT | Mod: PBBFAC | Performed by: REGISTERED NURSE

## 2022-12-21 PROCEDURE — 73562 X-RAY EXAM OF KNEE 3: CPT | Mod: 26,50,, | Performed by: RADIOLOGY

## 2022-12-21 PROCEDURE — 99999 PR PBB SHADOW E&M-EST. PATIENT-LVL III: ICD-10-PCS | Mod: PBBFAC,,, | Performed by: REGISTERED NURSE

## 2022-12-21 PROCEDURE — 99214 OFFICE O/P EST MOD 30 MIN: CPT | Mod: S$PBB,,, | Performed by: REGISTERED NURSE

## 2022-12-21 PROCEDURE — 99214 PR OFFICE/OUTPT VISIT, EST, LEVL IV, 30-39 MIN: ICD-10-PCS | Mod: S$PBB,,, | Performed by: REGISTERED NURSE

## 2022-12-21 PROCEDURE — 99213 OFFICE O/P EST LOW 20 MIN: CPT | Mod: PBBFAC,27 | Performed by: NURSE PRACTITIONER

## 2022-12-21 PROCEDURE — 73562 XR KNEE ORTHO BILAT: ICD-10-PCS | Mod: 26,50,, | Performed by: RADIOLOGY

## 2022-12-21 RX ADMIN — TRIAMCINOLONE ACETONIDE 40 MG: 40 INJECTION, SUSPENSION INTRA-ARTICULAR; INTRAMUSCULAR at 12:12

## 2022-12-27 ENCOUNTER — TELEPHONE (OUTPATIENT)
Dept: INTERNAL MEDICINE | Facility: CLINIC | Age: 87
End: 2022-12-27
Payer: MEDICARE

## 2022-12-27 ENCOUNTER — OFFICE VISIT (OUTPATIENT)
Dept: URGENT CARE | Facility: CLINIC | Age: 87
End: 2022-12-27
Payer: MEDICARE

## 2022-12-27 VITALS
RESPIRATION RATE: 16 BRPM | HEIGHT: 63 IN | DIASTOLIC BLOOD PRESSURE: 65 MMHG | WEIGHT: 136 LBS | TEMPERATURE: 100 F | HEART RATE: 69 BPM | OXYGEN SATURATION: 94 % | SYSTOLIC BLOOD PRESSURE: 159 MMHG | BODY MASS INDEX: 24.1 KG/M2

## 2022-12-27 DIAGNOSIS — J06.9 VIRAL URI WITH COUGH: Primary | ICD-10-CM

## 2022-12-27 DIAGNOSIS — R50.9 FEVER, UNSPECIFIED FEVER CAUSE: ICD-10-CM

## 2022-12-27 LAB
CTP QC/QA: YES
CTP QC/QA: YES
POC MOLECULAR INFLUENZA A AGN: NEGATIVE
POC MOLECULAR INFLUENZA B AGN: NEGATIVE
SARS-COV-2 AG RESP QL IA.RAPID: NEGATIVE

## 2022-12-27 PROCEDURE — 87502 POCT INFLUENZA A/B MOLECULAR: ICD-10-PCS | Mod: QW,S$GLB,, | Performed by: NURSE PRACTITIONER

## 2022-12-27 PROCEDURE — 87811 SARS-COV-2 COVID19 W/OPTIC: CPT | Mod: QW,S$GLB,, | Performed by: NURSE PRACTITIONER

## 2022-12-27 PROCEDURE — 87502 INFLUENZA DNA AMP PROBE: CPT | Mod: QW,S$GLB,, | Performed by: NURSE PRACTITIONER

## 2022-12-27 PROCEDURE — 99213 OFFICE O/P EST LOW 20 MIN: CPT | Mod: S$GLB,CS,, | Performed by: NURSE PRACTITIONER

## 2022-12-27 PROCEDURE — 99213 PR OFFICE/OUTPT VISIT, EST, LEVL III, 20-29 MIN: ICD-10-PCS | Mod: S$GLB,CS,, | Performed by: NURSE PRACTITIONER

## 2022-12-27 PROCEDURE — 87811 SARS CORONAVIRUS 2 ANTIGEN POCT, MANUAL READ: ICD-10-PCS | Mod: QW,S$GLB,, | Performed by: NURSE PRACTITIONER

## 2022-12-27 NOTE — TELEPHONE ENCOUNTER
----- Message from Aaliyah Orozco sent at 12/27/2022 10:05 AM CST -----  Regarding: advice  Contact: patient [phone]  [email or web address]     Patient is calling for Medical Advice regarding: Coughing up yellow mucus,      How long has patient had these symptoms:  last night    Pharmacy name and phone#: .  [id number] - [place] - [address]  [address]  [place] [address]  Phone: [phone] Fax: [phone]     Would like response via [employer]:  please call    Comments:

## 2022-12-27 NOTE — PROGRESS NOTES
"Subjective:       Patient ID: Gerda Lai is a 89 y.o. female.    Vitals:  height is 5' 3" (1.6 m) and weight is 61.7 kg (136 lb). Her oral temperature is 99.7 °F (37.6 °C). Her blood pressure is 159/65 (abnormal) and her pulse is 69. Her respiration is 16 and oxygen saturation is 94% (abnormal).     Chief Complaint: Cough    89 year old female presents for complaints of sneezing, runny nose, headache, sore throat, subjective fever Onset yesterday. vomiting (Christmas morning), diarrhea (Christmas morning). OTC Mucinex last night. Recent ENT Consult and F/U with PCP with [facility].  Completed 7 day course last week with symptom improvement. Possible sick contact exposure during holidays      Cough  This is a recurrent problem. The current episode started in the past 7 days. The problem has been gradually worsening. The problem occurs constantly. The cough is Productive of sputum. Associated symptoms include a fever, headaches, nasal congestion, postnasal drip and a sore throat. Pertinent negatives include no chills, ear pain or myalgias. The symptoms are aggravated by lying down. Treatments tried: mucinex, coriseptic spray. The treatment provided mild relief.     Constitution: Positive for activity change, appetite change (decreased po intake), fatigue and fever. Negative for chills and sweating.   HENT:  Positive for postnasal drip and sore throat. Negative for ear pain, ear discharge, congestion, sinus pain and sinus pressure.    Neck: neck negative.   Cardiovascular: Negative.    Eyes: Negative.    Respiratory:  Positive for cough.    Gastrointestinal: Negative.    Endocrine: negative.   Genitourinary: Negative.    Musculoskeletal: Negative.  Negative for muscle ache.   Allergic/Immunologic: Negative.    Neurological:  Positive for headaches.   Hematologic/Lymphatic: Negative.    Psychiatric/Behavioral: Negative.       Objective:      Physical Exam   Constitutional: She is oriented to person, place, and " time. She is cooperative. normal  HENT:   Head: Normocephalic and atraumatic.   Ears:   Right Ear: Tympanic membrane, external ear and ear canal normal. No cerumen not present. Tympanic membrane is not injected and not erythematous. Decreased hearing is noted.   Left Ear: Tympanic membrane, external ear and ear canal normal. No cerumen not present. Tympanic membrane is not injected and not erythematous. Decreased hearing is noted.   Nose: Mucosal edema, rhinorrhea and congestion present. Right sinus exhibits no maxillary sinus tenderness and no frontal sinus tenderness. Left sinus exhibits no maxillary sinus tenderness and no frontal sinus tenderness.   Mouth/Throat: Mucous membranes are normal. Mucous membranes are moist. Posterior oropharyngeal erythema and cobblestoning present. No oropharyngeal exudate, posterior oropharyngeal edema or tonsillar abscesses. Tonsils are 0 on the right. Tonsils are 0 on the left. No tonsillar exudate.   Bilateral hearing aides in use      Comments: Bilateral hearing aides in use  Eyes: Conjunctivae are normal. Pupils are equal, round, and reactive to light. Right eye exhibits no discharge. Left eye exhibits no discharge. No scleral icterus. Extraocular movement intact   Neck: Neck supple.   Cardiovascular: Normal rate, regular rhythm and normal heart sounds.   Pulmonary/Chest: Effort normal and breath sounds normal. No stridor. No respiratory distress. She has no decreased breath sounds. She has no wheezes. She has no rhonchi. She has no rales. She exhibits no tenderness.   Abdominal: Normal appearance.   Musculoskeletal: Normal range of motion.         General: Normal range of motion.   Neurological: no focal deficit. She is alert and oriented to person, place, and time.   Skin: Skin is warm and dry.   Psychiatric: Her behavior is normal. Mood, judgment and thought content normal.   Nursing note and vitals reviewed.      Results for orders placed or performed in visit on 12/27/22    POCT Influenza A/B MOLECULAR   Result Value Ref Range    POC Molecular Influenza A Ag Negative Negative, Not Reported    POC Molecular Influenza B Ag Negative Negative, Not Reported     Acceptable Yes    SARS Coronavirus 2 Antigen, POCT Manual Read   Result Value Ref Range    SARS Coronavirus 2 Antigen Negative Negative     Acceptable Yes      *Note: Due to a large number of results and/or encounters for the requested time period, some results have not been displayed. A complete set of results can be found in Results Review.       Assessment:       1. Viral URI with cough    2. Fever, unspecified fever cause          Plan:         Viral URI with cough    Fever, unspecified fever cause  -     Cancel: POCT COVID-19 Rapid Screening  -     POCT Influenza A/B MOLECULAR  -     SARS Coronavirus 2 Antigen, POCT Manual Read              Patient Instructions   Rest  Hydration  Zyrtec or Claritin OTC as directed  Tylenol OTC as directed  Continue Mucinex D OTC as directed  F/U with PCP

## 2022-12-27 NOTE — TELEPHONE ENCOUNTER
Spoke with patient, advises to be seen in urgent care or clinic today or tomorrow. States she is too tired to go today. She will go to urgent care tomorrow since no available appts.

## 2022-12-28 NOTE — PATIENT INSTRUCTIONS
Rest  Hydration  Zyrtec or Claritin OTC as directed  Tylenol OTC as directed  Continue Mucinex D OTC as directed  F/U with PCP

## 2023-01-02 RX ORDER — TRIAMCINOLONE ACETONIDE 40 MG/ML
40 INJECTION, SUSPENSION INTRA-ARTICULAR; INTRAMUSCULAR
Status: COMPLETED | OUTPATIENT
Start: 2022-12-21 | End: 2022-12-21

## 2023-01-02 NOTE — PROGRESS NOTES
CC: Pain of the Right Knee      HPI: Pt with c/o continuing right knee pain for the past year. She was being seen by back and spine today and asked if she could be seen for her right knee. She has a history of left knee replacement in August 2020. She reports that she felt like the recovery was harder than she expected and she wouldn't want to have surgery by the same doctor if she decided to have the right knee done. She is thinking that she wants to consider having the right knee replaced now because she can't deal with the pain and disability anymore. She has had cortisone injections and a radiofrequency ablation done in the past with only minimal relief. She is using a 4 wheeled walker for ambulation. She lives alone, but says that her family would be able to help her after surgery.   Medically she has HTN, HLD, CKD stage III, aortic atherosclerosis, lumbar radiculopathy, and GERD. All are well managed with medication.    ROS  General: denies fever and chills  Resp: no c/o sob  CVS: no c/o cp  MSK: c/o right knee pain    PE  General: AAOx3, pleasant and cooperative  Resp: respirations even and unlabored  MSK: right knee exam  -5 degrees extension  100 degrees flexion  No warmth or erythema   - effusion  + tenderness over the lateral joint line  4/5 quad strength    Xray:  Reviewed by me with the patient: significant arthritis noted in the right knee worse laterally and in the patellofemoral joint lines with severe valgus deformity    Assessment:  Right knee djd    Plan:  Cortisone injection right knee today- surgery won't be sooner than 3 months from now  Appt made with Dr. Cobian, at pt's request, to discuss knee replacement  She is to make family arrangements for surgery this summer  RICE prn    Knee Injection Procedure Note  Diagnosis: right knee degenerative arthritis  Indications: right knee pain  Procedure Details: Verbal consent was obtained for the procedure. The injection site was identified and the skin  was prepared with alcohol. The right knee was injected from an anterolateral approach with 1 ml of Kenalog and 4 ml Lidocaine under sterile technique using a 22 gauge needle. The needle was removed and the area cleansed and dressed.  Complications:  Patient tolerated the procedure well.    she was advised to rest the knee today, using ice and elevation as needed for comfort and swelling.Immediate relief of the knee pain may be short lived and secondary to the lidocaine. she may have an increase in discomfort tonight followed by steady improvement over the next several days. It may take 1-2 weeks following the injection to get the full benefit of the medication.

## 2023-01-17 NOTE — PROGRESS NOTES
Established Patient - Audio Only Telehealth Visit     The patient location is: home  The chief complaint leading to consultation is: MRI results   Visit type: Virtual visit with audio only (telephone)  Total time spent with patient: 10min     The reason for the audio only service rather than synchronous audio and video virtual visit was related to technical difficulties or patient preference/necessity.     Each patient to whom I provide medical services by telemedicine is:  (1) informed of the relationship between the physician and patient and the respective role of any other health care provider with respect to management of the patient; and (2) notified that they may decline to receive medical services by telemedicine and may withdraw from such care at any time. Patient verbally consented to receive this service via voice-only telephone call.    DATE: 1/20/2023  PATIENT: Gerda Lai    Attending Physician: Hardy Keys M.D.    HISTORY:  Gerda Lai is a 89 y.o. female who returns to me today for MRI results.  She was last seen by me 12/21/2022.  Today she is doing well but notes continued neck and bilateral arm pain.    The Patient denies myelopathic symptoms such as handwriting changes or difficulty with buttons/coins/keys. Denies perineal paresthesias, bowel/bladder dysfunction.    PMH/PSH/FamHx/SocHx:  Unchanged from prior visit    ROS:  REVIEW OF SYSTEMS:  Constitution: Negative. Negative for chills, fever and night sweats.   HENT: Negative for congestion and headaches.    Eyes: Negative for blurred vision, left vision loss and right vision loss.   Cardiovascular: Negative for chest pain and syncope.   Respiratory: Negative for cough and shortness of breath.    Endocrine: Negative for polydipsia, polyphagia and polyuria.   Hematologic/Lymphatic: Negative for bleeding problem. Does not bruise/bleed easily.   Skin: Negative for dry skin, itching and rash.   Musculoskeletal: Negative for falls and  muscle weakness.   Gastrointestinal: Negative for abdominal pain and bowel incontinence.   Allergic/Immunologic: Negative for hives and persistent infections.  Genitourinary: Negative for urinary retention/incontinence and nocturia.   Neurological: negative for disturbances in coordination, no myelopathic symptoms such as handwriting changes or difficulty with buttons, coins, keys or small objects. No loss of balance and seizures.   Psychiatric/Behavioral: Negative for depression. The patient does not have insomnia.   Denies myelopathic symptoms, perineal paresthesias, bowel or bladder incontinence    EXAM:  There were no vitals taken for this visit.  Stable     IMAGING:    Today I personally re-reviewed AP, Lat and Flex/Ex  upright C-spine films that demonstrate severe DDD.      Cervical MRI shows moderate spinal canal stenosis noted at the level of C1 and C4-C5.  Moderate to severe neural foraminal narrowing noted at C2-C6.    There is no height or weight on file to calculate BMI.    Hemoglobin A1C   Date Value Ref Range Status   05/27/2011 5.5 4.0 - 6.2 % Final         ASSESSMENT/PLAN:    Diagnoses and all orders for this visit:    Cervical radiculopathy      Today we discussed at length all of the different treatment options including anti-inflammatories, acetaminophen, rest, ice, heat, physical therapy including strengthening and stretching exercises, home exercises, ROM, aerobic conditioning, aqua therapy, other modalities including ultrasound, massage, and dry needling, epidural steroid injections and finally surgical intervention.    The patient will follow in the clinic with pain management. She may follow up with me as needed.      This service was not originating from a related E/M service provided within the previous 7 days nor will  to an E/M service or procedure within the next 24 hours or my soonest available appointment.  Prevailing standard of care was able to be met in this audio-only visit.

## 2023-01-18 ENCOUNTER — HOSPITAL ENCOUNTER (OUTPATIENT)
Dept: RADIOLOGY | Facility: HOSPITAL | Age: 88
Discharge: HOME | End: 2023-01-18
Attending: REGISTERED NURSE
Payer: MEDICARE

## 2023-01-18 DIAGNOSIS — M54.2 CERVICALGIA: ICD-10-CM

## 2023-01-18 PROCEDURE — 72141 MRI NECK SPINE W/O DYE: CPT | Mod: 26,,, | Performed by: RADIOLOGY

## 2023-01-18 PROCEDURE — 72141 MRI CERVICAL SPINE WITHOUT CONTRAST: ICD-10-PCS | Mod: 26,,, | Performed by: RADIOLOGY

## 2023-01-18 PROCEDURE — 72141 MRI NECK SPINE W/O DYE: CPT | Mod: TC

## 2023-01-20 ENCOUNTER — OFFICE VISIT (OUTPATIENT)
Dept: ORTHOPEDICS | Facility: CLINIC | Age: 88
End: 2023-01-20
Payer: MEDICARE

## 2023-01-20 ENCOUNTER — TELEPHONE (OUTPATIENT)
Dept: ORTHOPEDICS | Facility: CLINIC | Age: 88
End: 2023-01-20
Payer: MEDICARE

## 2023-01-20 ENCOUNTER — TELEPHONE (OUTPATIENT)
Dept: PAIN MEDICINE | Facility: CLINIC | Age: 88
End: 2023-01-20
Payer: MEDICARE

## 2023-01-20 DIAGNOSIS — M54.12 CERVICAL RADICULOPATHY: Primary | ICD-10-CM

## 2023-01-20 PROCEDURE — 99441 PR PHYSICIAN TELEPHONE EVALUATION 5-10 MIN: ICD-10-PCS | Mod: 95,,, | Performed by: REGISTERED NURSE

## 2023-01-20 PROCEDURE — 99441 PR PHYSICIAN TELEPHONE EVALUATION 5-10 MIN: CPT | Mod: 95,,, | Performed by: REGISTERED NURSE

## 2023-01-20 NOTE — TELEPHONE ENCOUNTER
Staff spoke with pt in regards to appt on 1/23/23 with  needing top be r/s due to it was scheduled incorrectly . Pt states that she will call Monday due to she is shopping.

## 2023-01-23 ENCOUNTER — TELEPHONE (OUTPATIENT)
Dept: PAIN MEDICINE | Facility: CLINIC | Age: 88
End: 2023-01-23
Payer: MEDICARE

## 2023-01-24 ENCOUNTER — TELEPHONE (OUTPATIENT)
Dept: PAIN MEDICINE | Facility: CLINIC | Age: 88
End: 2023-01-24
Payer: MEDICARE

## 2023-01-24 ENCOUNTER — PATIENT MESSAGE (OUTPATIENT)
Dept: INTERNAL MEDICINE | Facility: CLINIC | Age: 88
End: 2023-01-24

## 2023-01-24 ENCOUNTER — OFFICE VISIT (OUTPATIENT)
Dept: INTERNAL MEDICINE | Facility: CLINIC | Age: 88
End: 2023-01-24
Payer: MEDICARE

## 2023-01-24 VITALS
TEMPERATURE: 98 F | OXYGEN SATURATION: 98 % | WEIGHT: 137.38 LBS | DIASTOLIC BLOOD PRESSURE: 50 MMHG | HEART RATE: 62 BPM | SYSTOLIC BLOOD PRESSURE: 122 MMHG | BODY MASS INDEX: 24.34 KG/M2 | RESPIRATION RATE: 20 BRPM | HEIGHT: 63 IN

## 2023-01-24 DIAGNOSIS — I10 HYPERTENSION, ESSENTIAL: Primary | ICD-10-CM

## 2023-01-24 DIAGNOSIS — M47.812 CERVICAL ARTHRITIS: ICD-10-CM

## 2023-01-24 DIAGNOSIS — J06.9 VIRAL URI: ICD-10-CM

## 2023-01-24 DIAGNOSIS — E04.1 THYROID NODULE: Primary | ICD-10-CM

## 2023-01-24 PROCEDURE — 99214 OFFICE O/P EST MOD 30 MIN: CPT | Mod: PBBFAC,PO | Performed by: INTERNAL MEDICINE

## 2023-01-24 PROCEDURE — 99214 OFFICE O/P EST MOD 30 MIN: CPT | Mod: S$PBB,,, | Performed by: INTERNAL MEDICINE

## 2023-01-24 PROCEDURE — 99999 PR PBB SHADOW E&M-EST. PATIENT-LVL IV: CPT | Mod: PBBFAC,,, | Performed by: INTERNAL MEDICINE

## 2023-01-24 PROCEDURE — 99999 PR PBB SHADOW E&M-EST. PATIENT-LVL IV: ICD-10-PCS | Mod: PBBFAC,,, | Performed by: INTERNAL MEDICINE

## 2023-01-24 PROCEDURE — 99214 PR OFFICE/OUTPT VISIT, EST, LEVL IV, 30-39 MIN: ICD-10-PCS | Mod: S$PBB,,, | Performed by: INTERNAL MEDICINE

## 2023-01-24 NOTE — PROGRESS NOTES
Subjective:       Patient ID: Gerda Lai is a 89 y.o. female.    Chief Complaint: Follow-up    HPI    89-year-old female here follow-up of urgent care.  Patient was seen on 12/27/2022.  Patient tested negative for flu and COVID.  She is feeling completely better from going to .      HTN - Patient is currently on  plendil 2.5 mg, labetalol 100 mg, cardura 1 mg. She does check her BP at home, and it runs 140s-150s sytolic. Side effects of medications note: none. Denies headaches, blurred vision, chest pain, shortness of breath, nausea.  She is concerned about increasing the felopidine to 5 mg. She is nervous about increasing the medication because she lives alone.    She fell a week ago Saturday.  She usually walks without assistance.  She has a black and blue mp on her arm. It scared her a little bit.    She has arthritis of her neck.  She is seeing pain management to get relief from the pain.    Review of Systems      Objective:      Physical Exam  Vitals reviewed.   Constitutional:       Appearance: She is well-developed.   HENT:      Head: Normocephalic and atraumatic.      Mouth/Throat:      Pharynx: No oropharyngeal exudate.   Eyes:      General: No scleral icterus.        Right eye: No discharge.         Left eye: No discharge.      Pupils: Pupils are equal, round, and reactive to light.   Neck:      Thyroid: No thyromegaly.      Trachea: No tracheal deviation.   Cardiovascular:      Rate and Rhythm: Normal rate and regular rhythm.      Heart sounds: Normal heart sounds. No murmur heard.    No friction rub. No gallop.   Pulmonary:      Effort: Pulmonary effort is normal. No respiratory distress.      Breath sounds: Normal breath sounds. No wheezing or rales.   Chest:      Chest wall: No tenderness.   Abdominal:      General: Bowel sounds are normal. There is no distension.      Palpations: Abdomen is soft. There is no mass.      Tenderness: There is no abdominal tenderness. There is no guarding or  rebound.   Musculoskeletal:         General: No tenderness. Normal range of motion.      Cervical back: Normal range of motion and neck supple.   Skin:     General: Skin is warm and dry.      Coloration: Skin is not pale.      Findings: No erythema or rash.   Neurological:      Mental Status: She is alert and oriented to person, place, and time.   Psychiatric:         Behavior: Behavior normal.       Assessment:       1. Hypertension, essential    2. Viral URI    3. Cervical arthritis      Plan:       1. Continue plendil 2.5 mg, labetalol 100 mg, cardura 1 mg.  Send message with blood pressures twice daily in a week.  2. Resolved.  3.  Agree with patient getting epidural injections

## 2023-01-24 NOTE — TELEPHONE ENCOUNTER
----- Message from Sharona Michaels sent at 1/24/2023 11:28 AM CST -----  Regarding: RETURN CALL  Who Called: JOSÉ MIGUEL CASTILLO [[id number]]        Who Left Message for Patient:jc        Does the patient know what this is regarding?yes        Best Call Back Number:[phone]        Additional Information:

## 2023-01-25 ENCOUNTER — PATIENT MESSAGE (OUTPATIENT)
Dept: INTERNAL MEDICINE | Facility: CLINIC | Age: 88
End: 2023-01-25
Payer: MEDICARE

## 2023-01-27 ENCOUNTER — HOSPITAL ENCOUNTER (OUTPATIENT)
Dept: RADIOLOGY | Facility: HOSPITAL | Age: 88
Discharge: HOME | End: 2023-01-27
Attending: INTERNAL MEDICINE
Payer: MEDICARE

## 2023-01-27 DIAGNOSIS — E04.1 THYROID NODULE: ICD-10-CM

## 2023-01-27 PROCEDURE — 76536 US SOFT TISSUE HEAD NECK THYROID: ICD-10-PCS | Mod: 26,,, | Performed by: STUDENT IN AN ORGANIZED HEALTH CARE EDUCATION/TRAINING PROGRAM

## 2023-01-27 PROCEDURE — 76536 US EXAM OF HEAD AND NECK: CPT | Mod: TC

## 2023-01-27 PROCEDURE — 76536 US EXAM OF HEAD AND NECK: CPT | Mod: 26,,, | Performed by: STUDENT IN AN ORGANIZED HEALTH CARE EDUCATION/TRAINING PROGRAM

## 2023-02-03 ENCOUNTER — PATIENT MESSAGE (OUTPATIENT)
Dept: PAIN MEDICINE | Facility: CLINIC | Age: 88
End: 2023-02-03
Payer: MEDICARE

## 2023-02-06 ENCOUNTER — OFFICE VISIT (OUTPATIENT)
Dept: PAIN MEDICINE | Facility: CLINIC | Age: 88
End: 2023-02-06
Payer: MEDICARE

## 2023-02-06 ENCOUNTER — TELEPHONE (OUTPATIENT)
Dept: PAIN MEDICINE | Facility: CLINIC | Age: 88
End: 2023-02-06
Payer: MEDICARE

## 2023-02-06 VITALS
BODY MASS INDEX: 24.33 KG/M2 | HEART RATE: 59 BPM | HEIGHT: 63 IN | SYSTOLIC BLOOD PRESSURE: 140 MMHG | DIASTOLIC BLOOD PRESSURE: 66 MMHG

## 2023-02-06 DIAGNOSIS — M47.812 CERVICAL SPONDYLOSIS: ICD-10-CM

## 2023-02-06 DIAGNOSIS — M54.12 CERVICAL RADICULOPATHY: Primary | ICD-10-CM

## 2023-02-06 DIAGNOSIS — M50.30 DDD (DEGENERATIVE DISC DISEASE), CERVICAL: ICD-10-CM

## 2023-02-06 DIAGNOSIS — M54.81 OCCIPITAL NEURALGIA OF LEFT SIDE: ICD-10-CM

## 2023-02-06 DIAGNOSIS — M79.18 MYOFASCIAL PAIN: ICD-10-CM

## 2023-02-06 PROCEDURE — 99214 OFFICE O/P EST MOD 30 MIN: CPT | Mod: S$PBB,,, | Performed by: NURSE PRACTITIONER

## 2023-02-06 PROCEDURE — 99214 PR OFFICE/OUTPT VISIT, EST, LEVL IV, 30-39 MIN: ICD-10-PCS | Mod: S$PBB,,, | Performed by: NURSE PRACTITIONER

## 2023-02-06 PROCEDURE — 99213 OFFICE O/P EST LOW 20 MIN: CPT | Mod: PBBFAC | Performed by: NURSE PRACTITIONER

## 2023-02-06 PROCEDURE — 99999 PR PBB SHADOW E&M-EST. PATIENT-LVL III: ICD-10-PCS | Mod: PBBFAC,,, | Performed by: NURSE PRACTITIONER

## 2023-02-06 PROCEDURE — 99999 PR PBB SHADOW E&M-EST. PATIENT-LVL III: CPT | Mod: PBBFAC,,, | Performed by: NURSE PRACTITIONER

## 2023-02-06 NOTE — PROGRESS NOTES
Chronic patient Established Note (Follow up visit)      SUBJECTIVE:    Gerda Lai presents to the clinic for a follow-up appointment for neck pain. She is a former patient of Dr. Blackmon. She reports neck pain that radiates into the scalp. She does have right shoulder and hand pain. She does report numbness into the right middle and ring finger. She does have a history of carpal tunnel release. She denies any weakness but does report dropping of objects from her hand. She previously had cervical RFA with limited relief. She has done physical therapy with limited relief. She denies any other health changes. Her pain today is 6/10.    Pain Disability Index Review:  Last 3 PDI Scores 2/6/2023 11/8/2021   Pain Disability Index (PDI) 25 25       Pain Medications:  None    Opioid Contract: not applicable     report:  Not applicable    Pain Procedures:   11/18/2021- Left C5,6,7 MBB  1/4/2022- Left C5,6,7 MBB  3/8/2022- Left C5,6,7 RFA    Physical Therapy/Home Exercise: yes    Imaging:   MRI Cervical Spine 1/18/2023:  COMPARISON:  Spine radiograph 12/05/2022, 11/08/2021, 02/13/2015     FINDINGS:  Straightening of the cervical lordosis.  Grade 1 retrolisthesis of C4-C5.  Grade 1 anterolisthesis of C6-C7, C7-T1 and T1-T2.  Degenerative endplate changes throughout the cervical spine.  No fracture or marrow infiltrative process.  Severe disc height loss at C3 through C6.  Suspected fusion of the C3-C4 disc space.  Osseous fusion of the left C2-C3 and C3-C4 facet joints.     Pre dens space is maintained.  Dens is intact.  There is thickening of the transverse ligament with moderate narrowing of the spinal canal between the transverse ligament and posterior arch of C1.     Cervical spinal cord demonstrates normal signal intensity.  Cerebellar tonsils are in their expected location.  Partially visualized posterior fossa is unremarkable.     Vertebral artery flow voids are present.  Paraspinal musculature demonstrates  normal bulk and signal intensity.  Note made of cystic right thyroid nodule.     C2-C3: Uncovertebral spurring and severe facet arthropathy result in moderate left neural foraminal narrowing.     C3-C4: Posterior disc osteophyte complex with uncovertebral spurring and severe facet arthropathy result in severe left, moderate right neural foraminal narrowing.     C4-C5: Posterior disc osteophyte complex with uncovertebral spurring and severe facet arthropathy result in moderate spinal canal stenosis and severe bilateral neural foraminal narrowing.     C5-C6: Posterior disc osteophyte complex with uncovertebral spurring and severe facet arthropathy result in mild spinal canal stenosis and severe right neural foraminal narrowing.     C6-C7: Posterior disc osteophyte complex and moderate facet arthropathy noted.  No spinal canal stenosis or neural foraminal narrowing.     C7-T1: Moderate facet arthropathy noted.  No spinal canal stenosis or neural foraminal narrowing.     Impression:     1. Multilevel degenerative changes of the cervical spine detailed above.  Moderate spinal canal stenosis noted at the level of C1 and C4-C5.  Moderate to severe neural foraminal narrowing noted at C2-C6.  2. Fusion across the C3-C4 disc space and left-sided C2-C4 facet joints.    Allergies:   Review of patient's allergies indicates:   Allergen Reactions    Sulfa (sulfonamide antibiotics) Rash    Clarithromycin Other (See Comments)     Weak, extreme fatigue. dizziness    Flexeril [cyclobenzaprine] Other (See Comments)     Dizziness    Iodine and iodide containing products     Lisinopril Other (See Comments)     Cough and sensation of throat swelling/?angioedema    Losartan Rash    Metoprolol Swelling     Tightness in throat    Tramadol Other (See Comments)     Dizzy and weak    Verapamil (bulk) Palpitations    Voltaren [diclofenac sodium] Other (See Comments)     Drops blood pressure       Current Medications:   Current Outpatient  Medications   Medication Sig Dispense Refill    acetaminophen (TYLENOL) 650 MG TbSR Take 1 tablet (650 mg total) by mouth every 8 (eight) hours as needed. 120 tablet 0    albuterol (PROVENTIL/VENTOLIN HFA) 90 mcg/actuation inhaler INHALE 1 TO 2 PUFFS BY MOUTH EVERY 6 HOURS AS NEEDED FOR WHEEZE 8.5 g 5    aliskiren (TEKTURNA) 300 MG Tab TAKE 1 TABLET BY MOUTH EVERY DAY 90 tablet 1    aspirin (ECOTRIN) 81 MG EC tablet TAKE 1 TABLET BY MOUTH EVERY DAY 90 tablet 3    coenzyme Q10 100 mg capsule Take 100 mg by mouth once daily.      felodipine (PLENDIL) 10 MG 24 hr tablet Take 1 tablet (10 mg total) by mouth once daily. 90 tablet 3    glucosamine-chondroitin 500-400 mg tablet Take 1 tablet by mouth once daily.       labetaloL (NORMODYNE) 100 MG tablet Take 100 mg by mouth 2 (two) times daily.      lactase (LACTAID) 3,000 unit tablet Take 1 tablet by mouth 3 (three) times daily as needed.      levoFLOXacin (LEVAQUIN) 500 MG tablet Take 1 tablet (500 mg total) by mouth once daily. 7 tablet 0    LORazepam (ATIVAN) 0.5 MG tablet TAKE 1/2 TABLET TO 1 TABLET BY MOUTH EVERY 12 HOURS AS NEEDED FOR ANXIETY 30 tablet 0    multivitamin capsule Take 1 capsule by mouth once daily.      omeprazole (PRILOSEC OTC) 20 MG tablet Take 20 mg by mouth once daily.      polymyxin B sulf-trimethoprim (POLYTRIM) 10,000 unit- 1 mg/mL Drop Place 1 drop into both eyes every 6 (six) hours. 10 mL 0    pravastatin (PRAVACHOL) 80 MG tablet Take 1 tablet (80 mg total) by mouth once daily. 90 tablet 3    TURMERIC ORAL Take 500 mg by mouth once daily.       vitamin D (VITAMIN D3) 1000 units Tab Take 1,000 Units by mouth once daily.       No current facility-administered medications for this visit.       REVIEW OF SYSTEMS:    GENERAL:  No weight loss, malaise or fevers.  HEENT:  Negative for frequent or significant headaches.  NECK:  Negative for lumps, goiter, pain and significant neck swelling.  RESPIRATORY:  Negative for cough, wheezing or shortness of  breath.  CARDIOVASCULAR:  Negative for chest pain, leg swelling or palpitations. HTN  GI:  Negative for abdominal discomfort, blood in stools or black stools or change in bowel habits.  MUSCULOSKELETAL:  See HPI.  SKIN:  Negative for lesions, rash, and itching.  PSYCH:  Negative for sleep disturbance, mood disorder and recent psychosocial stressors.  HEMATOLOGY/LYMPHOLOGY:  Negative for prolonged bleeding, bruising easily or swollen nodes.  NEURO:   No history of headaches, syncope, paralysis, seizures or tremors.  ENDO: Thyroid disorder  All other reviewed and negative other than HPI.    Past Medical History:  Past Medical History:   Diagnosis Date    Anxiety     Arthritis     Basal cell carcinoma 09/2016    right post auricular neck     Breast cancer 1992    right    Cataract     Fibromyalgia     History of measles as a child     Hyperlipidemia     Hypertension     Personal history of colonic polyps     Pneumonia     SCC (squamous cell carcinoma) 2015    R chest    SCC (squamous cell carcinoma) 2016    left medial shoulder    SCC (squamous cell carcinoma) 2017    left knee    SCC (squamous cell carcinoma) 2022    L upper chest, R upper arm KA types    Shingles     Squamous cell carcinoma 2015    right forearm    Thyroid disease     Vaginitis        Past Surgical History:  Past Surgical History:   Procedure Laterality Date    ADENOIDECTOMY      BREAST BIOPSY Left     Excisional bx, benign    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    CARPAL TUNNEL RELEASE Right 3/16/2021    Procedure: RELEASE, CARPAL TUNNEL;  Surgeon: Barbara Aldridge MD;  Location: [facility] OR;  Service: Orthopedics;  Laterality: Right;    CATARACT EXTRACTION W/  INTRAOCULAR LENS IMPLANT Bilateral     EYE SURGERY      HAND SURGERY      INJECTION OF ANESTHETIC AGENT AROUND MEDIAL BRANCH NERVES INNERVATING CERVICAL FACET JOINT N/A 1/4/2022    Procedure: Cervical Medial Branch Block C5-6, C6-7 (No Sedation);  Surgeon: Himanshu Blackmon Jr., MD;  Location:  [facility];  Service: Pain Management;  Laterality: N/A;    INJECTION OF ANESTHETIC AGENT AROUND MEDIAL BRANCH NERVES INNERVATING LUMBAR FACET JOINT Left 11/18/2021    Procedure: Cervical Medial Branch Block Left C5-6, C6-7 (IV Sedation);  Surgeon: Himanshu Blackmon Jr., MD;  Location: [facility];  Service: Pain Management;  Laterality: Left;    JOINT REPLACEMENT Right     ELZBIETA    KNEE ARTHROPLASTY Left 8/10/2020    Procedure: ARTHROPLASTY, KNEE-ADE NEXGEN/CEMENTED TIBIA;  Surgeon: Kalin Pacheco MD;  Location: [facility] OR;  Service: Orthopedics;  Laterality: Left;    RADIOFREQUENCY THERMAL COAGULATION OF MEDIAL BRANCH OF POSTERIOR RAMUS OF CERVICAL SPINAL NERVE Left 3/8/2022    Procedure: RADIOFREQUENCY THERMAL COAGULATION, NERVE, SPINAL, CERVICAL, LEFT C5-6 AND C6-7;  Surgeon: Himanshu Blackmon Jr., MD;  Location: [facility];  Service: Pain Management;  Laterality: Left;  NO PACEMAKER   ASA    thyriodectomy      partial    tonsillectomy      TONSILLECTOMY      TOTAL HIP ARTHROPLASTY      right    Yag  Left        Family History:  Family History   Problem Relation Age of Onset    Breast cancer Mother     Cancer Mother     Stroke Paternal Grandfather     Heart disease Father     Cancer Paternal Aunt     Heart disease Paternal Aunt     Glaucoma Paternal Grandmother     Amblyopia Neg Hx     Blindness Neg Hx     Cataracts Neg Hx     Diabetes Neg Hx     Hypertension Neg Hx     Macular degeneration Neg Hx     Retinal detachment Neg Hx     Strabismus Neg Hx     Thyroid disease Neg Hx     Melanoma Neg Hx        Social History:  Social History     Socioeconomic History    Marital status: Single   Occupational History     Employer: RETIRED   Tobacco Use    Smoking status: Never     Passive exposure: Never    Smokeless tobacco: Never   Substance and Sexual Activity    Alcohol use: Yes     Comment: socially - celebrations only    Drug use: Never    Sexual activity: Not Currently     Social Determinants of Health  "    Financial Resource Strain: Low Risk     Difficulty of Paying Living Expenses: Not hard at all   Food Insecurity: No Food Insecurity    Worried About Running Out of Food in the Last Year: Never true    Ran Out of Food in the Last Year: Never true   Transportation Needs: No Transportation Needs    Lack of Transportation (Medical): No    Lack of Transportation (Non-Medical): No   Stress: Stress Concern Present    Feeling of Stress : To some extent   Housing Stability: Unknown    Unable to Pay for Housing in the Last Year: No    Unstable Housing in the Last Year: No       OBJECTIVE:    BP (!) 140/66 (BP Location: Left arm, Patient Position: Sitting, BP Method: Medium (Automatic))   Pulse (!) 59   Ht 5' 3" (1.6 m)   BMI 24.33 kg/m²     PHYSICAL EXAMINATION:    General appearance: Well appearing, in no acute distress, alert and oriented x3.  Psych:  Mood and affect appropriate.  Skin: Skin color, texture, turgor normal, no rashes or lesions, in both upper and lower body.  Head/face:  Atraumatic, normocephalic. No palpable lymph nodes. There is pain with palpation over left occipital protuberance.   Neck: There is pain with palpation over cervical paraspinals and trapezius muscles, left greater than right.  Spurling Negative. Limited ROM with pain on flexion, extension, and lateral rotation.   Cor: RRR  Pulm: Symmetric chest rise, no respiratory distress noted.   Extremities:  No deformities, edema, or skin discoloration. Good capillary refill.  Musculoskeletal:   strength 4/5 bilaterally.  No atrophy or tone abnormalities are noted.  Neuro:  No loss of sensation is noted.  Gait: Antalgic- ambulates with cane.     ASSESSMENT: 89 y.o. year old female with neck pain, consistent with the followin. Cervical radiculopathy        2. Cervical spondylosis        3. DDD (degenerative disc disease), cervical        4. Occipital neuralgia of left side        5. Myofascial pain            PLAN:     - She is a former " patient of Dr. Blackmon who is no longer with [facility]. I will have her establish care with Dr. Cisneros as the [place] location is better for her.     - Previous imaging was reviewed and discussed with the patient today.    - Recommend C7/T1 IL HEIDI.     - Consider occipital nerve block in the future, pending HEIDI response.     - I have stressed the importance of physical activity and a home exercise plan to help with pain and improve health.    - Patient can continue with medications for now since they are providing benefits, using them appropriately, and without side effects.    - RTC PRN, she will be scheduled at the [place] location.     The above plan and management options were discussed at length with patient. Patient is in agreement with the above and verbalized understanding.    Rebeca Gregorio NP  02/06/2023    I spent a total of 30 minutes on the day of the visit.  This includes face to face time and non-face to face time preparing to see the patient by reviewing previous labs/imaging, obtaining and/or reviewing separately obtained history, documenting clinical information in the electronic or other health record, independently interpreting results and communicating results to the patient/family/caregiver.

## 2023-02-06 NOTE — H&P (VIEW-ONLY)
Chronic patient Established Note (Follow up visit)      SUBJECTIVE:    Gerda Lai presents to the clinic for a follow-up appointment for neck pain. She is a former patient of Dr. Blackmon. She reports neck pain that radiates into the scalp. She does have right shoulder and hand pain. She does report numbness into the right middle and ring finger. She does have a history of carpal tunnel release. She denies any weakness but does report dropping of objects from her hand. She previously had cervical RFA with limited relief. She has done physical therapy with limited relief. She denies any other health changes. Her pain today is 6/10.    Pain Disability Index Review:  Last 3 PDI Scores 2/6/2023 11/8/2021   Pain Disability Index (PDI) 25 25       Pain Medications:  None    Opioid Contract: not applicable     report:  Not applicable    Pain Procedures:   11/18/2021- Left C5,6,7 MBB  1/4/2022- Left C5,6,7 MBB  3/8/2022- Left C5,6,7 RFA    Physical Therapy/Home Exercise: yes    Imaging:   MRI Cervical Spine 1/18/2023:  COMPARISON:  Spine radiograph 12/05/2022, 11/08/2021, 02/13/2015     FINDINGS:  Straightening of the cervical lordosis.  Grade 1 retrolisthesis of C4-C5.  Grade 1 anterolisthesis of C6-C7, C7-T1 and T1-T2.  Degenerative endplate changes throughout the cervical spine.  No fracture or marrow infiltrative process.  Severe disc height loss at C3 through C6.  Suspected fusion of the C3-C4 disc space.  Osseous fusion of the left C2-C3 and C3-C4 facet joints.     Pre dens space is maintained.  Dens is intact.  There is thickening of the transverse ligament with moderate narrowing of the spinal canal between the transverse ligament and posterior arch of C1.     Cervical spinal cord demonstrates normal signal intensity.  Cerebellar tonsils are in their expected location.  Partially visualized posterior fossa is unremarkable.     Vertebral artery flow voids are present.  Paraspinal musculature demonstrates  normal bulk and signal intensity.  Note made of cystic right thyroid nodule.     C2-C3: Uncovertebral spurring and severe facet arthropathy result in moderate left neural foraminal narrowing.     C3-C4: Posterior disc osteophyte complex with uncovertebral spurring and severe facet arthropathy result in severe left, moderate right neural foraminal narrowing.     C4-C5: Posterior disc osteophyte complex with uncovertebral spurring and severe facet arthropathy result in moderate spinal canal stenosis and severe bilateral neural foraminal narrowing.     C5-C6: Posterior disc osteophyte complex with uncovertebral spurring and severe facet arthropathy result in mild spinal canal stenosis and severe right neural foraminal narrowing.     C6-C7: Posterior disc osteophyte complex and moderate facet arthropathy noted.  No spinal canal stenosis or neural foraminal narrowing.     C7-T1: Moderate facet arthropathy noted.  No spinal canal stenosis or neural foraminal narrowing.     Impression:     1. Multilevel degenerative changes of the cervical spine detailed above.  Moderate spinal canal stenosis noted at the level of C1 and C4-C5.  Moderate to severe neural foraminal narrowing noted at C2-C6.  2. Fusion across the C3-C4 disc space and left-sided C2-C4 facet joints.    Allergies:   Review of patient's allergies indicates:   Allergen Reactions    Sulfa (sulfonamide antibiotics) Rash    Clarithromycin Other (See Comments)     Weak, extreme fatigue. dizziness    Flexeril [cyclobenzaprine] Other (See Comments)     Dizziness    Iodine and iodide containing products     Lisinopril Other (See Comments)     Cough and sensation of throat swelling/?angioedema    Losartan Rash    Metoprolol Swelling     Tightness in throat    Tramadol Other (See Comments)     Dizzy and weak    Verapamil (bulk) Palpitations    Voltaren [diclofenac sodium] Other (See Comments)     Drops blood pressure       Current Medications:   Current Outpatient  Medications   Medication Sig Dispense Refill    acetaminophen (TYLENOL) 650 MG TbSR Take 1 tablet (650 mg total) by mouth every 8 (eight) hours as needed. 120 tablet 0    albuterol (PROVENTIL/VENTOLIN HFA) 90 mcg/actuation inhaler INHALE 1 TO 2 PUFFS BY MOUTH EVERY 6 HOURS AS NEEDED FOR WHEEZE 8.5 g 5    aliskiren (TEKTURNA) 300 MG Tab TAKE 1 TABLET BY MOUTH EVERY DAY 90 tablet 1    aspirin (ECOTRIN) 81 MG EC tablet TAKE 1 TABLET BY MOUTH EVERY DAY 90 tablet 3    coenzyme Q10 100 mg capsule Take 100 mg by mouth once daily.      felodipine (PLENDIL) 10 MG 24 hr tablet Take 1 tablet (10 mg total) by mouth once daily. 90 tablet 3    glucosamine-chondroitin 500-400 mg tablet Take 1 tablet by mouth once daily.       labetaloL (NORMODYNE) 100 MG tablet Take 100 mg by mouth 2 (two) times daily.      lactase (LACTAID) 3,000 unit tablet Take 1 tablet by mouth 3 (three) times daily as needed.      levoFLOXacin (LEVAQUIN) 500 MG tablet Take 1 tablet (500 mg total) by mouth once daily. 7 tablet 0    LORazepam (ATIVAN) 0.5 MG tablet TAKE 1/2 TABLET TO 1 TABLET BY MOUTH EVERY 12 HOURS AS NEEDED FOR ANXIETY 30 tablet 0    multivitamin capsule Take 1 capsule by mouth once daily.      omeprazole (PRILOSEC OTC) 20 MG tablet Take 20 mg by mouth once daily.      polymyxin B sulf-trimethoprim (POLYTRIM) 10,000 unit- 1 mg/mL Drop Place 1 drop into both eyes every 6 (six) hours. 10 mL 0    pravastatin (PRAVACHOL) 80 MG tablet Take 1 tablet (80 mg total) by mouth once daily. 90 tablet 3    TURMERIC ORAL Take 500 mg by mouth once daily.       vitamin D (VITAMIN D3) 1000 units Tab Take 1,000 Units by mouth once daily.       No current facility-administered medications for this visit.       REVIEW OF SYSTEMS:    GENERAL:  No weight loss, malaise or fevers.  HEENT:  Negative for frequent or significant headaches.  NECK:  Negative for lumps, goiter, pain and significant neck swelling.  RESPIRATORY:  Negative for cough, wheezing or shortness of  breath.  CARDIOVASCULAR:  Negative for chest pain, leg swelling or palpitations. HTN  GI:  Negative for abdominal discomfort, blood in stools or black stools or change in bowel habits.  MUSCULOSKELETAL:  See HPI.  SKIN:  Negative for lesions, rash, and itching.  PSYCH:  Negative for sleep disturbance, mood disorder and recent psychosocial stressors.  HEMATOLOGY/LYMPHOLOGY:  Negative for prolonged bleeding, bruising easily or swollen nodes.  NEURO:   No history of headaches, syncope, paralysis, seizures or tremors.  ENDO: Thyroid disorder  All other reviewed and negative other than HPI.    Past Medical History:  Past Medical History:   Diagnosis Date    Anxiety     Arthritis     Basal cell carcinoma 09/2016    right post auricular neck     Breast cancer 1992    right    Cataract     Fibromyalgia     History of measles as a child     Hyperlipidemia     Hypertension     Personal history of colonic polyps     Pneumonia     SCC (squamous cell carcinoma) 2015    R chest    SCC (squamous cell carcinoma) 2016    left medial shoulder    SCC (squamous cell carcinoma) 2017    left knee    SCC (squamous cell carcinoma) 2022    L upper chest, R upper arm KA types    Shingles     Squamous cell carcinoma 2015    right forearm    Thyroid disease     Vaginitis        Past Surgical History:  Past Surgical History:   Procedure Laterality Date    ADENOIDECTOMY      BREAST BIOPSY Left     Excisional bx, benign    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    CARPAL TUNNEL RELEASE Right 3/16/2021    Procedure: RELEASE, CARPAL TUNNEL;  Surgeon: Barbara Aldridge MD;  Location: [facility] OR;  Service: Orthopedics;  Laterality: Right;    CATARACT EXTRACTION W/  INTRAOCULAR LENS IMPLANT Bilateral     EYE SURGERY      HAND SURGERY      INJECTION OF ANESTHETIC AGENT AROUND MEDIAL BRANCH NERVES INNERVATING CERVICAL FACET JOINT N/A 1/4/2022    Procedure: Cervical Medial Branch Block C5-6, C6-7 (No Sedation);  Surgeon: Himanshu Blackmon Jr., MD;  Location:  [facility] PAIN MGT;  Service: Pain Management;  Laterality: N/A;    INJECTION OF ANESTHETIC AGENT AROUND MEDIAL BRANCH NERVES INNERVATING LUMBAR FACET JOINT Left 11/18/2021    Procedure: Cervical Medial Branch Block Left C5-6, C6-7 (IV Sedation);  Surgeon: Himanshu Blackmon Jr., MD;  Location: [facility] PAIN MGT;  Service: Pain Management;  Laterality: Left;    JOINT REPLACEMENT Right     ELZBIETA    KNEE ARTHROPLASTY Left 8/10/2020    Procedure: ARTHROPLASTY, KNEE-ADE NEXGEN/CEMENTED TIBIA;  Surgeon: Kalin Pacheco MD;  Location: [facility] OR;  Service: Orthopedics;  Laterality: Left;    RADIOFREQUENCY THERMAL COAGULATION OF MEDIAL BRANCH OF POSTERIOR RAMUS OF CERVICAL SPINAL NERVE Left 3/8/2022    Procedure: RADIOFREQUENCY THERMAL COAGULATION, NERVE, SPINAL, CERVICAL, LEFT C5-6 AND C6-7;  Surgeon: Himanshu Blackmon Jr., MD;  Location: [facility] PAIN MGT;  Service: Pain Management;  Laterality: Left;  NO PACEMAKER   ASA    thyriodectomy      partial    tonsillectomy      TONSILLECTOMY      TOTAL HIP ARTHROPLASTY      right    Yag  Left        Family History:  Family History   Problem Relation Age of Onset    Breast cancer Mother     Cancer Mother     Stroke Paternal Grandfather     Heart disease Father     Cancer Paternal Aunt     Heart disease Paternal Aunt     Glaucoma Paternal Grandmother     Amblyopia Neg Hx     Blindness Neg Hx     Cataracts Neg Hx     Diabetes Neg Hx     Hypertension Neg Hx     Macular degeneration Neg Hx     Retinal detachment Neg Hx     Strabismus Neg Hx     Thyroid disease Neg Hx     Melanoma Neg Hx        Social History:  Social History     Socioeconomic History    Marital status: Single   Occupational History     Employer: RETIRED   Tobacco Use    Smoking status: Never     Passive exposure: Never    Smokeless tobacco: Never   Substance and Sexual Activity    Alcohol use: Yes     Comment: socially - celebrations only    Drug use: Never    Sexual activity: Not Currently     Social Determinants of Health  "    Financial Resource Strain: Low Risk     Difficulty of Paying Living Expenses: Not hard at all   Food Insecurity: No Food Insecurity    Worried About Running Out of Food in the Last Year: Never true    Ran Out of Food in the Last Year: Never true   Transportation Needs: No Transportation Needs    Lack of Transportation (Medical): No    Lack of Transportation (Non-Medical): No   Stress: Stress Concern Present    Feeling of Stress : To some extent   Housing Stability: Unknown    Unable to Pay for Housing in the Last Year: No    Unstable Housing in the Last Year: No       OBJECTIVE:    BP (!) 140/66 (BP Location: Left arm, Patient Position: Sitting, BP Method: Medium (Automatic))   Pulse (!) 59   Ht 5' 3" (1.6 m)   BMI 24.33 kg/m²     PHYSICAL EXAMINATION:    General appearance: Well appearing, in no acute distress, alert and oriented x3.  Psych:  Mood and affect appropriate.  Skin: Skin color, texture, turgor normal, no rashes or lesions, in both upper and lower body.  Head/face:  Atraumatic, normocephalic. No palpable lymph nodes. There is pain with palpation over left occipital protuberance.   Neck: There is pain with palpation over cervical paraspinals and trapezius muscles, left greater than right.  Spurling Negative. Limited ROM with pain on flexion, extension, and lateral rotation.   Cor: RRR  Pulm: Symmetric chest rise, no respiratory distress noted.   Extremities:  No deformities, edema, or skin discoloration. Good capillary refill.  Musculoskeletal:   strength 4/5 bilaterally.  No atrophy or tone abnormalities are noted.  Neuro:  No loss of sensation is noted.  Gait: Antalgic- ambulates with cane.     ASSESSMENT: 89 y.o. year old female with neck pain, consistent with the followin. Cervical radiculopathy        2. Cervical spondylosis        3. DDD (degenerative disc disease), cervical        4. Occipital neuralgia of left side        5. Myofascial pain            PLAN:     - She is a former " patient of Dr. Blackmon who is no longer with [facility]. I will have her establish care with Dr. Cisneros as the [place] location is better for her.     - Previous imaging was reviewed and discussed with the patient today.    - Recommend C7/T1 IL HEIDI.     - Consider occipital nerve block in the future, pending HEIDI response.     - I have stressed the importance of physical activity and a home exercise plan to help with pain and improve health.    - Patient can continue with medications for now since they are providing benefits, using them appropriately, and without side effects.    - RTC PRN, she will be scheduled at the [place] location.     The above plan and management options were discussed at length with patient. Patient is in agreement with the above and verbalized understanding.    Rebeca Gregorio NP  02/06/2023    I spent a total of 30 minutes on the day of the visit.  This includes face to face time and non-face to face time preparing to see the patient by reviewing previous labs/imaging, obtaining and/or reviewing separately obtained history, documenting clinical information in the electronic or other health record, independently interpreting results and communicating results to the patient/family/caregiver.

## 2023-02-06 NOTE — H&P (VIEW-ONLY)
Chronic patient Established Note (Follow up visit)      SUBJECTIVE:    Gerda Lai presents to the clinic for a follow-up appointment for neck pain. She is a former patient of Dr. Blackmon. She reports neck pain that radiates into the scalp. She does have right shoulder and hand pain. She does report numbness into the right middle and ring finger. She does have a history of carpal tunnel release. She denies any weakness but does report dropping of objects from her hand. She previously had cervical RFA with limited relief. She has done physical therapy with limited relief. She denies any other health changes. Her pain today is 6/10.    Pain Disability Index Review:  Last 3 PDI Scores 2/6/2023 11/8/2021   Pain Disability Index (PDI) 25 25       Pain Medications:  None    Opioid Contract: not applicable     report:  Not applicable    Pain Procedures:   11/18/2021- Left C5,6,7 MBB  1/4/2022- Left C5,6,7 MBB  3/8/2022- Left C5,6,7 RFA    Physical Therapy/Home Exercise: yes    Imaging:   MRI Cervical Spine 1/18/2023:  COMPARISON:  Spine radiograph 12/05/2022, 11/08/2021, 02/13/2015     FINDINGS:  Straightening of the cervical lordosis.  Grade 1 retrolisthesis of C4-C5.  Grade 1 anterolisthesis of C6-C7, C7-T1 and T1-T2.  Degenerative endplate changes throughout the cervical spine.  No fracture or marrow infiltrative process.  Severe disc height loss at C3 through C6.  Suspected fusion of the C3-C4 disc space.  Osseous fusion of the left C2-C3 and C3-C4 facet joints.     Pre dens space is maintained.  Dens is intact.  There is thickening of the transverse ligament with moderate narrowing of the spinal canal between the transverse ligament and posterior arch of C1.     Cervical spinal cord demonstrates normal signal intensity.  Cerebellar tonsils are in their expected location.  Partially visualized posterior fossa is unremarkable.     Vertebral artery flow voids are present.  Paraspinal musculature demonstrates  normal bulk and signal intensity.  Note made of cystic right thyroid nodule.     C2-C3: Uncovertebral spurring and severe facet arthropathy result in moderate left neural foraminal narrowing.     C3-C4: Posterior disc osteophyte complex with uncovertebral spurring and severe facet arthropathy result in severe left, moderate right neural foraminal narrowing.     C4-C5: Posterior disc osteophyte complex with uncovertebral spurring and severe facet arthropathy result in moderate spinal canal stenosis and severe bilateral neural foraminal narrowing.     C5-C6: Posterior disc osteophyte complex with uncovertebral spurring and severe facet arthropathy result in mild spinal canal stenosis and severe right neural foraminal narrowing.     C6-C7: Posterior disc osteophyte complex and moderate facet arthropathy noted.  No spinal canal stenosis or neural foraminal narrowing.     C7-T1: Moderate facet arthropathy noted.  No spinal canal stenosis or neural foraminal narrowing.     Impression:     1. Multilevel degenerative changes of the cervical spine detailed above.  Moderate spinal canal stenosis noted at the level of C1 and C4-C5.  Moderate to severe neural foraminal narrowing noted at C2-C6.  2. Fusion across the C3-C4 disc space and left-sided C2-C4 facet joints.    Allergies:   Review of patient's allergies indicates:   Allergen Reactions    Sulfa (sulfonamide antibiotics) Rash    Clarithromycin Other (See Comments)     Weak, extreme fatigue. dizziness    Flexeril [cyclobenzaprine] Other (See Comments)     Dizziness    Iodine and iodide containing products     Lisinopril Other (See Comments)     Cough and sensation of throat swelling/?angioedema    Losartan Rash    Metoprolol Swelling     Tightness in throat    Tramadol Other (See Comments)     Dizzy and weak    Verapamil (bulk) Palpitations    Voltaren [diclofenac sodium] Other (See Comments)     Drops blood pressure       Current Medications:   Current Outpatient  Medications   Medication Sig Dispense Refill    acetaminophen (TYLENOL) 650 MG TbSR Take 1 tablet (650 mg total) by mouth every 8 (eight) hours as needed. 120 tablet 0    albuterol (PROVENTIL/VENTOLIN HFA) 90 mcg/actuation inhaler INHALE 1 TO 2 PUFFS BY MOUTH EVERY 6 HOURS AS NEEDED FOR WHEEZE 8.5 g 5    aliskiren (TEKTURNA) 300 MG Tab TAKE 1 TABLET BY MOUTH EVERY DAY 90 tablet 1    aspirin (ECOTRIN) 81 MG EC tablet TAKE 1 TABLET BY MOUTH EVERY DAY 90 tablet 3    coenzyme Q10 100 mg capsule Take 100 mg by mouth once daily.      felodipine (PLENDIL) 10 MG 24 hr tablet Take 1 tablet (10 mg total) by mouth once daily. 90 tablet 3    glucosamine-chondroitin 500-400 mg tablet Take 1 tablet by mouth once daily.       labetaloL (NORMODYNE) 100 MG tablet Take 100 mg by mouth 2 (two) times daily.      lactase (LACTAID) 3,000 unit tablet Take 1 tablet by mouth 3 (three) times daily as needed.      levoFLOXacin (LEVAQUIN) 500 MG tablet Take 1 tablet (500 mg total) by mouth once daily. 7 tablet 0    LORazepam (ATIVAN) 0.5 MG tablet TAKE 1/2 TABLET TO 1 TABLET BY MOUTH EVERY 12 HOURS AS NEEDED FOR ANXIETY 30 tablet 0    multivitamin capsule Take 1 capsule by mouth once daily.      omeprazole (PRILOSEC OTC) 20 MG tablet Take 20 mg by mouth once daily.      polymyxin B sulf-trimethoprim (POLYTRIM) 10,000 unit- 1 mg/mL Drop Place 1 drop into both eyes every 6 (six) hours. 10 mL 0    pravastatin (PRAVACHOL) 80 MG tablet Take 1 tablet (80 mg total) by mouth once daily. 90 tablet 3    TURMERIC ORAL Take 500 mg by mouth once daily.       vitamin D (VITAMIN D3) 1000 units Tab Take 1,000 Units by mouth once daily.       No current facility-administered medications for this visit.       REVIEW OF SYSTEMS:    GENERAL:  No weight loss, malaise or fevers.  HEENT:  Negative for frequent or significant headaches.  NECK:  Negative for lumps, goiter, pain and significant neck swelling.  RESPIRATORY:  Negative for cough, wheezing or shortness of  breath.  CARDIOVASCULAR:  Negative for chest pain, leg swelling or palpitations. HTN  GI:  Negative for abdominal discomfort, blood in stools or black stools or change in bowel habits.  MUSCULOSKELETAL:  See HPI.  SKIN:  Negative for lesions, rash, and itching.  PSYCH:  Negative for sleep disturbance, mood disorder and recent psychosocial stressors.  HEMATOLOGY/LYMPHOLOGY:  Negative for prolonged bleeding, bruising easily or swollen nodes.  NEURO:   No history of headaches, syncope, paralysis, seizures or tremors.  ENDO: Thyroid disorder  All other reviewed and negative other than HPI.    Past Medical History:  Past Medical History:   Diagnosis Date    Anxiety     Arthritis     Basal cell carcinoma 09/2016    right post auricular neck     Breast cancer 1992    right    Cataract     Fibromyalgia     History of measles as a child     Hyperlipidemia     Hypertension     Personal history of colonic polyps     Pneumonia     SCC (squamous cell carcinoma) 2015    R chest    SCC (squamous cell carcinoma) 2016    left medial shoulder    SCC (squamous cell carcinoma) 2017    left knee    SCC (squamous cell carcinoma) 2022    L upper chest, R upper arm KA types    Shingles     Squamous cell carcinoma 2015    right forearm    Thyroid disease     Vaginitis        Past Surgical History:  Past Surgical History:   Procedure Laterality Date    ADENOIDECTOMY      BREAST BIOPSY Left     Excisional bx, benign    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    CARPAL TUNNEL RELEASE Right 3/16/2021    Procedure: RELEASE, CARPAL TUNNEL;  Surgeon: Barbara Aldridge MD;  Location[facility] OR;  Service: Orthopedics;  Laterality: Right;    CATARACT EXTRACTION W/  INTRAOCULAR LENS IMPLANT Bilateral     EYE SURGERY      HAND SURGERY      INJECTION OF ANESTHETIC AGENT AROUND MEDIAL BRANCH NERVES INNERVATING CERVICAL FACET JOINT N/A 1/4/2022    Procedure: Cervical Medial Branch Block C5-6, C6-7 (No Sedation);  Surgeon: Himanshu Blackmon Jr., MD;  Location:  [facility] PAIN MGT;  Service: Pain Management;  Laterality: N/A;    INJECTION OF ANESTHETIC AGENT AROUND MEDIAL BRANCH NERVES INNERVATING LUMBAR FACET JOINT Left 11/18/2021    Procedure: Cervical Medial Branch Block Left C5-6, C6-7 (IV Sedation);  Surgeon: Himanshu Blackmon Jr., MD;  Location: [facility] PAIN [facility]GT;  Service: Pain Management;  Laterality: Left;    JOINT REPLACEMENT Right     ELZBIETA    KNEE ARTHROPLASTY Left 8/10/2020    Procedure: ARTHROPLASTY, KNEE-ADE NEXGEN/CEMENTED TIBIA;  Surgeon: Kalin Pacheco MD;  Location: [facility] OR;  Service: Orthopedics;  Laterality: Left;    RADIOFREQUENCY THERMAL COAGULATION OF MEDIAL BRANCH OF POSTERIOR RAMUS OF CERVICAL SPINAL NERVE Left 3/8/2022    Procedure: RADIOFREQUENCY THERMAL COAGULATION, NERVE, SPINAL, CERVICAL, LEFT C5-6 AND C6-7;  Surgeon: Himanshu Blackmon Jr., MD;  Location: [facility] PAIN MGT;  Service: Pain Management;  Laterality: Left;  NO PACEMAKER   ASA    thyriodectomy      partial    tonsillectomy      TONSILLECTOMY      TOTAL HIP ARTHROPLASTY      right    Yag  Left        Family History:  Family History   Problem Relation Age of Onset    Breast cancer Mother     Cancer Mother     Stroke Paternal Grandfather     Heart disease Father     Cancer Paternal Aunt     Heart disease Paternal Aunt     Glaucoma Paternal Grandmother     Amblyopia Neg Hx     Blindness Neg Hx     Cataracts Neg Hx     Diabetes Neg Hx     Hypertension Neg Hx     Macular degeneration Neg Hx     Retinal detachment Neg Hx     Strabismus Neg Hx     Thyroid disease Neg Hx     Melanoma Neg Hx        Social History:  Social History     Socioeconomic History    Marital status: Single   Occupational History     Employer: RETIRED   Tobacco Use    Smoking status: Never     Passive exposure: Never    Smokeless tobacco: Never   Substance and Sexual Activity    Alcohol use: Yes     Comment: socially - celebrations only    Drug use: Never    Sexual activity: Not Currently     Social Determinants of Health  "    Financial Resource Strain: Low Risk     Difficulty of Paying Living Expenses: Not hard at all   Food Insecurity: No Food Insecurity    Worried About Running Out of Food in the Last Year: Never true    Ran Out of Food in the Last Year: Never true   Transportation Needs: No Transportation Needs    Lack of Transportation (Medical): No    Lack of Transportation (Non-Medical): No   Stress: Stress Concern Present    Feeling of Stress : To some extent   Housing Stability: Unknown    Unable to Pay for Housing in the Last Year: No    Unstable Housing in the Last Year: No       OBJECTIVE:    BP (!) 140/66 (BP Location: Left arm, Patient Position: Sitting, BP Method: Medium (Automatic))   Pulse (!) 59   Ht 5' 3" (1.6 m)   BMI 24.33 kg/m²     PHYSICAL EXAMINATION:    General appearance: Well appearing, in no acute distress, alert and oriented x3.  Psych:  Mood and affect appropriate.  Skin: Skin color, texture, turgor normal, no rashes or lesions, in both upper and lower body.  Head/face:  Atraumatic, normocephalic. No palpable lymph nodes. There is pain with palpation over left occipital protuberance.   Neck: There is pain with palpation over cervical paraspinals and trapezius muscles, left greater than right.  Spurling Negative. Limited ROM with pain on flexion, extension, and lateral rotation.   Cor: RRR  Pulm: Symmetric chest rise, no respiratory distress noted.   Extremities:  No deformities, edema, or skin discoloration. Good capillary refill.  Musculoskeletal:   strength 4/5 bilaterally.  No atrophy or tone abnormalities are noted.  Neuro:  No loss of sensation is noted.  Gait: Antalgic- ambulates with cane.     ASSESSMENT: 89 y.o. year old female with neck pain, consistent with the followin. Cervical radiculopathy        2. Cervical spondylosis        3. DDD (degenerative disc disease), cervical        4. Occipital neuralgia of left side        5. Myofascial pain            PLAN:     - She is a former " patient of Dr. Blackmon who is no longer with [facility]. I will have her establish care with Dr. Cisneros as the [place] location is better for her.     - Previous imaging was reviewed and discussed with the patient today.    - Recommend C7/T1 IL HEIDI.     - Consider occipital nerve block in the future, pending HEIDI response.     - I have stressed the importance of physical activity and a home exercise plan to help with pain and improve health.    - Patient can continue with medications for now since they are providing benefits, using them appropriately, and without side effects.    - RTC PRN, she will be scheduled at the [place] location.     The above plan and management options were discussed at length with patient. Patient is in agreement with the above and verbalized understanding.    Rebeca Gregorio NP  02/06/2023    I spent a total of 30 minutes on the day of the visit.  This includes face to face time and non-face to face time preparing to see the patient by reviewing previous labs/imaging, obtaining and/or reviewing separately obtained history, documenting clinical information in the electronic or other health record, independently interpreting results and communicating results to the patient/family/caregiver.

## 2023-02-09 ENCOUNTER — OFFICE VISIT (OUTPATIENT)
Dept: ORTHOPEDICS | Facility: CLINIC | Age: 88
End: 2023-02-09
Payer: MEDICARE

## 2023-02-09 VITALS — HEIGHT: 62 IN | WEIGHT: 129.5 LBS | BODY MASS INDEX: 23.83 KG/M2

## 2023-02-09 DIAGNOSIS — Z96.641 PRESENCE OF RIGHT ARTIFICIAL HIP JOINT: ICD-10-CM

## 2023-02-09 DIAGNOSIS — M17.11 PRIMARY OSTEOARTHRITIS OF RIGHT KNEE: Primary | ICD-10-CM

## 2023-02-09 DIAGNOSIS — Z96.652 PRESENCE OF LEFT ARTIFICIAL KNEE JOINT: ICD-10-CM

## 2023-02-09 PROCEDURE — 99999 PR PBB SHADOW E&M-EST. PATIENT-LVL II: CPT | Mod: PBBFAC,,, | Performed by: ORTHOPAEDIC SURGERY

## 2023-02-09 PROCEDURE — 99215 PR OFFICE/OUTPT VISIT, EST, LEVL V, 40-54 MIN: ICD-10-PCS | Mod: S$PBB,,, | Performed by: ORTHOPAEDIC SURGERY

## 2023-02-09 PROCEDURE — 99215 OFFICE O/P EST HI 40 MIN: CPT | Mod: S$PBB,,, | Performed by: ORTHOPAEDIC SURGERY

## 2023-02-09 PROCEDURE — 99999 PR PBB SHADOW E&M-EST. PATIENT-LVL II: ICD-10-PCS | Mod: PBBFAC,,, | Performed by: ORTHOPAEDIC SURGERY

## 2023-02-09 PROCEDURE — 99212 OFFICE O/P EST SF 10 MIN: CPT | Mod: PBBFAC | Performed by: ORTHOPAEDIC SURGERY

## 2023-02-09 NOTE — PROGRESS NOTES
Subjective:     HPI:   Gerda Lai is a 89 y.o. female who presents for eval R knee OA, requests CLAYTON TKA    She has had years of progressive right knee pain moderate to severe activity related and relieved with rest.  Severe valgus deformity which is also causing foot pain.    She had a severe valgus left knee arthritis and had a left total knee replacement in 2020 with Dr. Mayfield.  Had a uneventful postoperative course.  She did go to skilled nursing facility could she lives alone in her knees bones the skilled nursing facility.  She says she felt like her slow recovery with slower than she would like it took over a year to feel normal again and she complains of some numbness at the anterior lateral aspect of the knee but no pain.  She says overall she has no problems at all with her knee.    However her niece has had bilateral Clayton total knee replacements done elsewhere in she is adamant that she would like a Clayton total knee replacement    L TKA (8/10/2020) with Dr. Pacheco -  The patient reported no issues with his inicision healing and no complications with infection after surgery. The patient spent 1-2 night(s) in the hospital. The patient had went to her niece +  own a SNF [place] of [place] for about 7-10 days. The patient did some OP-PT  after she went home from the SNF. The patient stated that he healed well since surgery. The patient is interested in the CLAYTON robot.   Implants: Farhat NexGen    Femur: Size D PS    Tibia: Size CD (3/4    Poly: ?    Patella: ?       Medications: Tylenol (650mg, q6-8), turmeric, dx of fibro but no meds    Injections: 12/21/2022 right knee iaCSI with Stacy Harris 50% relief for a couple weeks; The patient has had several right knee iaCSI in the past with decreasing relief results    Physical Therapy: Yes, complete OP-PT 8/2022, the patient reported that the physical therapy did help when she was going. She is currently doing a HEP.    Bracing: Yes,   knee brace Rx'd    Assistive Devices: Yes, cane and rolator, the patient uses her cane around the house and her rolator for long walks.     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     Social support: The patient stated that they live at home alone. The patient stated that she doesn't have anyone in the area that would be able to help take care of them if they were to have surgery.     Niece owns SNF       ROS:  The updated medical history is in the chart and has been reviewed. A review of systems is updated and there is no reported vision changes, ear/nose/mouth/throat complaints,  chest pain, shortness of breath, abdominal pain, urological complaints, fevers or chills, psychiatric complaints. Musculoskeletal and neurologcial symptoms are as documented. All other systems are negative.      Objective:   Exam:  There were no vitals filed for this visit.  Body mass index is 23.69 kg/m².    Physical examination included assessment of the patient's general appearance with particular attention to development, nutrition, body habitus, attention to grooming, and any evidence of distress.  Constitutional: The patient is a well-developed, well-nourished patient in no acute distress.   Cardiovascular: Vascular examination included warmth and capillary refill as well inspection for edema and assessment of pedal pulses. Pulses are palpable and regular.  Musculoskeletal: Gait was assessed as to whether it was steady, non-antalgic, and/or required the use of an assist device. The patient was also asked to walk independently and get onto the examination table.  Skin: The skin was examined for any obvious rashes or lesions in the extremity.  Neurologic: Sensation is intact to light touch in the extremity. The  patient has good coordination without hyperreflexia and is alert and oriented to person, place and time and has normal mood and affect.     All of the above were examined and found to be within normal limits except for the following pertinent clinical findings:    Severe limp and antalgic gait on the right side.  Right knee 0-120 degree knee range of motion 20 degrees valgus alignment which does correct.  Tender palpation mediolateral patellofemoral joint lines mild-to-moderate effusion knee stable anterior-posterior varus valgus stresses with no flexion contracture or extensor lag.    Thin skin    Left knee well-healed total knee incision 2-130 degree knee range of motion 7 degrees valgus alignment knee good stability anterior-posterior varus valgus stresses with slight flexion contracture but no extensor lag.  She is tender palpation minimally at the medial patella she is tender at the pes anserinus.  Right hip no groin pain with straight leg raise no pain with passive active range of motion of the right hip she is sore over the greater trochanter she had a fall there recently and had a bruise      Imaging:    KNEE R ARTHRITIS    Indication:  Right knee pain  Exam Ordered: Radiographs of the right knee include a standing anteroposterior view, a standing posterioanterior view, a lateral view in full flexion, and a sunrise view  Details of Examination: Exam shows evidence of joint space narrowing, osteophyte formation, and subchondral sclerosis, all consistent with degenerative arthritis of the knee.  No other significant findings are noted.  Impression:  Degenerative Arthritis, Right Knee    KL g4 severe valgus bone on bone arthritis    L TKA, R ELZBIETA look good on previous imaging      Assessment:       ICD-10-CM ICD-9-CM   1. Primary osteoarthritis of right knee  M17.11 715.16   2. Presence of left artificial knee joint  Z96.652 V43.65   3. Presence of right artificial hip joint  Z96.641 V43.64      10 medication  allergies  Dx of fibro, no meds  R hybrid ELZBIETA LO  L TKA unresurffaced patella 2020 siegel  C spine injections  L spine radicular pain, no injections  Osteopenia/porosis  CKD 1.2/43  Fall risk, recent falls     Plan:       This patient has significant symptoms in their knee that are affecting their quality of life and daily activities.  They have tried non-operative treatment including analgesics, an exercise program, and activity modification, but the symptoms have persisted. I believe they make a good candidate for knee arthroplasty.     Implants:   Company: [employer]  System: Triathlon  CR/CS  universal tray  conventional poly     PS/TS backup  50 stem backup  +/- resurface patella    CT scan: [facility] protocol for operative planning   Additional discussion about pin sites: risk of wound healing issues and fracture      DVT prophylaxis: ASA 81mg BID x1 month    Dispo: SNF - lives alone, niece owns SNF, went to SNF after L TKA    Admission status: Inpatient  - Monday only  Location: [facility]      We had a discussion that the best predictor of how she would do after knee replacement is how she did after her left knee replacement.  She has a beautifully functioning left knee replacement which is functioning well no pain just expected anterolateral numbness and I cannot improve on those results.  We also discussed that there is little data to suggest that a MAKOplasty will dramatically improve her recovery or long-term results compared to a well-functioning manual knee, however she is adamant that she would like a robotic knee    R TKA 5/22/23 (Monday only at [facility] for snf discharge)   F/u for pre-op visit    Patient meets inpatient criteria due to pre-op debility, medical comorbidities, complex nature of surgery, lack of social support at home      INPATIENT ADMISSION STATUS  Per CMS MLN Matters Number [id number]:  X   I believe that this patient meets inpatient admission status criteria as there is a reasonable expectation  of medically necessary hospital services for 2 midnights or longer including all outpatient/observation and inpatient care time    X   Please see the supporting documentation in the medical record that supports the admitting physicians determination that the patient required inpatient care despite the lack of a 2-midnight expectation based upon complex medical factors including but not limited to:  -Patients history, co-morbidities and current medical needs  -Risk of adverse events  -Severity of signs and symptoms   X            No orders of the defined types were placed in this encounter.            Past Medical History:   Diagnosis Date    Anxiety     Arthritis     Basal cell carcinoma 09/2016    right post auricular neck     Breast cancer 1992    right    Cataract     Fibromyalgia     History of measles as a child     Hyperlipidemia     Hypertension     Personal history of colonic polyps     Pneumonia     SCC (squamous cell carcinoma) 2015    R chest    SCC (squamous cell carcinoma) 2016    left medial shoulder    SCC (squamous cell carcinoma) 2017    left knee    SCC (squamous cell carcinoma) 2022    L upper chest, R upper arm KA types    Shingles     Squamous cell carcinoma 2015    right forearm    Thyroid disease     Vaginitis        Past Surgical History:   Procedure Laterality Date    ADENOIDECTOMY      BREAST BIOPSY Left     Excisional bx, benign    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    CARPAL TUNNEL RELEASE Right 3/16/2021    Procedure: RELEASE, CARPAL TUNNEL;  Surgeon: Barbara Aldridge MD;  Location: [facility] OR;  Service: Orthopedics;  Laterality: Right;    CATARACT EXTRACTION W/  INTRAOCULAR LENS IMPLANT Bilateral     EYE SURGERY      HAND SURGERY      INJECTION OF ANESTHETIC AGENT AROUND MEDIAL BRANCH NERVES INNERVATING CERVICAL FACET JOINT N/A 1/4/2022    Procedure: Cervical Medial Branch Block C5-6, C6-7 (No Sedation);  Surgeon: Himanshu Blackmon Jr., MD;  Location: [facility] PAIN MGT;  Service: Pain  Management;  Laterality: N/A;    INJECTION OF ANESTHETIC AGENT AROUND MEDIAL BRANCH NERVES INNERVATING LUMBAR FACET JOINT Left 11/18/2021    Procedure: Cervical Medial Branch Block Left C5-6, C6-7 (IV Sedation);  Surgeon: Himanshu Blackmon Jr., MD;  Location: [facility] PAIN MGT;  Service: Pain Management;  Laterality: Left;    JOINT REPLACEMENT Right     ELZBIETA    KNEE ARTHROPLASTY Left 8/10/2020    Procedure: ARTHROPLASTY, KNEE-ADE NEXGEN/CEMENTED TIBIA;  Surgeon: Kalin Pacheco MD;  Location: [facility] OR;  Service: Orthopedics;  Laterality: Left;    RADIOFREQUENCY THERMAL COAGULATION OF MEDIAL BRANCH OF POSTERIOR RAMUS OF CERVICAL SPINAL NERVE Left 3/8/2022    Procedure: RADIOFREQUENCY THERMAL COAGULATION, NERVE, SPINAL, CERVICAL, LEFT C5-6 AND C6-7;  Surgeon: Himanshu Blackmon Jr., MD;  Location: [facility] PAIN MGT;  Service: Pain Management;  Laterality: Left;  NO PACEMAKER   ASA    thyriodectomy      partial    tonsillectomy      TONSILLECTOMY      TOTAL HIP ARTHROPLASTY      right    Yag  Left        Family History   Problem Relation Age of Onset    Breast cancer Mother     Cancer Mother     Stroke Paternal Grandfather     Heart disease Father     Cancer Paternal Aunt     Heart disease Paternal Aunt     Glaucoma Paternal Grandmother     Amblyopia Neg Hx     Blindness Neg Hx     Cataracts Neg Hx     Diabetes Neg Hx     Hypertension Neg Hx     Macular degeneration Neg Hx     Retinal detachment Neg Hx     Strabismus Neg Hx     Thyroid disease Neg Hx     Melanoma Neg Hx        Social History     Socioeconomic History    Marital status: Single   Occupational History     Employer: RETIRED   Tobacco Use    Smoking status: Never     Passive exposure: Never    Smokeless tobacco: Never   Substance and Sexual Activity    Alcohol use: Yes     Comment: socially - celebrations only    Drug use: Never    Sexual activity: Not Currently     Social Determinants of Health     Financial Resource Strain: Low Risk     Difficulty of Paying  Living Expenses: Not hard at all   Food Insecurity: No Food Insecurity    Worried About Running Out of Food in the Last Year: Never true    Ran Out of Food in the Last Year: Never true   Transportation Needs: No Transportation Needs    Lack of Transportation (Medical): No    Lack of Transportation (Non-Medical): No   Stress: Stress Concern Present    Feeling of Stress : To some extent   Housing Stability: Unknown    Unable to Pay for Housing in the Last Year: No    Unstable Housing in the Last Year: No

## 2023-02-10 DIAGNOSIS — M17.11 PRIMARY OSTEOARTHRITIS OF RIGHT KNEE: Primary | ICD-10-CM

## 2023-02-13 ENCOUNTER — OFFICE VISIT (OUTPATIENT)
Dept: DERMATOLOGY | Facility: CLINIC | Age: 88
End: 2023-02-13
Payer: MEDICARE

## 2023-02-13 DIAGNOSIS — D18.01 CHERRY ANGIOMA: ICD-10-CM

## 2023-02-13 DIAGNOSIS — L57.0 AK (ACTINIC KERATOSIS): Primary | ICD-10-CM

## 2023-02-13 DIAGNOSIS — Z85.828 PERSONAL HISTORY OF SKIN CANCER: ICD-10-CM

## 2023-02-13 DIAGNOSIS — L90.5 SCAR: ICD-10-CM

## 2023-02-13 DIAGNOSIS — L82.0 INFLAMED SEBORRHEIC KERATOSIS: ICD-10-CM

## 2023-02-13 DIAGNOSIS — L81.4 LENTIGO: ICD-10-CM

## 2023-02-13 DIAGNOSIS — L82.1 SK (SEBORRHEIC KERATOSIS): ICD-10-CM

## 2023-02-13 DIAGNOSIS — D22.9 NEVUS: ICD-10-CM

## 2023-02-13 PROCEDURE — 17000 DESTRUCT PREMALG LESION: CPT | Mod: 59,PBBFAC,PO | Performed by: DERMATOLOGY

## 2023-02-13 PROCEDURE — 99213 PR OFFICE/OUTPT VISIT, EST, LEVL III, 20-29 MIN: ICD-10-PCS | Mod: 25,S$PBB,, | Performed by: DERMATOLOGY

## 2023-02-13 PROCEDURE — 99213 OFFICE O/P EST LOW 20 MIN: CPT | Mod: 25,S$PBB,, | Performed by: DERMATOLOGY

## 2023-02-13 PROCEDURE — 17003 DESTRUCT PREMALG LES 2-14: CPT | Mod: 59,S$PBB,, | Performed by: DERMATOLOGY

## 2023-02-13 PROCEDURE — 17110 DESTRUCTION B9 LES UP TO 14: CPT | Mod: PBBFAC,PO | Performed by: DERMATOLOGY

## 2023-02-13 PROCEDURE — 99999 PR PBB SHADOW E&M-EST. PATIENT-LVL III: ICD-10-PCS | Mod: PBBFAC,,, | Performed by: DERMATOLOGY

## 2023-02-13 PROCEDURE — 17003 DESTRUCTION, PREMALIGNANT LESIONS; SECOND THROUGH 14 LESIONS: ICD-10-PCS | Mod: 59,S$PBB,, | Performed by: DERMATOLOGY

## 2023-02-13 PROCEDURE — 17110 DESTRUCTION B9 LES UP TO 14: CPT | Mod: S$PBB,,, | Performed by: DERMATOLOGY

## 2023-02-13 PROCEDURE — 99999 PR PBB SHADOW E&M-EST. PATIENT-LVL III: CPT | Mod: PBBFAC,,, | Performed by: DERMATOLOGY

## 2023-02-13 PROCEDURE — 17110 PR DESTRUCTION BENIGN LESIONS UP TO 14: ICD-10-PCS | Mod: S$PBB,,, | Performed by: DERMATOLOGY

## 2023-02-13 PROCEDURE — 17000 DESTRUCT PREMALG LESION: CPT | Mod: 59,S$PBB,, | Performed by: DERMATOLOGY

## 2023-02-13 PROCEDURE — 17000 PR DESTRUCTION(LASER SURGERY,CRYOSURGERY,CHEMOSURGERY),PREMALIGNANT LESIONS,FIRST LESION: ICD-10-PCS | Mod: 59,S$PBB,, | Performed by: DERMATOLOGY

## 2023-02-13 PROCEDURE — 17003 DESTRUCT PREMALG LES 2-14: CPT | Mod: 59,PBBFAC,PO | Performed by: DERMATOLOGY

## 2023-02-13 PROCEDURE — 99213 OFFICE O/P EST LOW 20 MIN: CPT | Mod: PBBFAC,PO | Performed by: DERMATOLOGY

## 2023-02-13 NOTE — PROGRESS NOTES
Subjective:       Patient ID:  Gerda Lai is a 89 y.o. female who presents for   Chief Complaint   Patient presents with    Skin Check     UBSE     Patient is here today for UBSE.       This is a high risk patient here to check for the development of new lesions.      Pt was last seen on 6/28/2022 for bx on left upper chest - invasive SCC excised 6/29/2022 SSW and right upper arm - invasive SCC excised 8/01/2022 JGD which has resolved    Review of Systems   Skin:  Negative for daily sunscreen use, activity-related sunscreen use and recent sunburn.   Hematologic/Lymphatic: Bruises/bleeds easily.      Objective:    Physical Exam   Constitutional: She appears well-developed and well-nourished. No distress.   Neurological: She is alert and oriented to person, place, and time. She is not disoriented.   Psychiatric: She has a normal mood and affect.   Skin:   Areas Examined (abnormalities noted in diagram):   Scalp / Hair Palpated and Inspected  Head / Face Inspection Performed  Neck Inspection Performed  Chest / Axilla Inspection Performed  Abdomen Inspection Performed  Back Inspection Performed  RUE Inspected  LUE Inspection Performed                     Diagram Legend     Erythematous scaling macule/papule c/w actinic keratosis       Vascular papule c/w angioma      Pigmented verrucoid papule/plaque c/w seborrheic keratosis      Yellow umbilicated papule c/w sebaceous hyperplasia      Irregularly shaped tan macule c/w lentigo     1-2 mm smooth white papules consistent with Milia      Movable subcutaneous cyst with punctum c/w epidermal inclusion cyst      Subcutaneous movable cyst c/w pilar cyst      Firm pink to brown papule c/w dermatofibroma      Pedunculated fleshy papule(s) c/w skin tag(s)      Evenly pigmented macule c/w junctional nevus     Mildly variegated pigmented, slightly irregular-bordered macule c/w mildly atypical nevus      Flesh colored to evenly pigmented papule c/w intradermal nevus        Pink pearly papule/plaque c/w basal cell carcinoma      Erythematous hyperkeratotic cursted plaque c/w SCC      Surgical scar with no sign of skin cancer recurrence      Open and closed comedones      Inflammatory papules and pustules      Verrucoid papule consistent consistent with wart     Erythematous eczematous patches and plaques     Dystrophic onycholytic nail with subungual debris c/w onychomycosis     Umbilicated papule    Erythematous-base heme-crusted tan verrucoid plaque consistent with inflamed seborrheic keratosis     Erythematous Silvery Scaling Plaque c/w Psoriasis     See annotation      Assessment / Plan:        AK (actinic keratosis)  Cryosurgery Procedure Note    Verbal consent from the patient is obtained including, but not limited to, risk of hypopigmentation/hyperpigmentation, scar, recurrence of lesion. The patient is aware of the precancerous quality and need for treatment of these lesions. Liquid nitrogen cryosurgery is applied to the 6 actinic keratoses, as detailed in the physical exam, to produce a freeze injury. The patient is aware that blisters may form and is instructed on wound care with gentle cleansing and use of vaseline ointment to keep moist until healed. The patient is supplied a handout on cryosurgery and is instructed to call if lesions do not completely resolve.    Lentigo  This is a benign hyperpigmented sun induced lesion. Recommend daily sun protection/avoidance and use of at least SPF 30, broad spectrum sunscreen (OTC drug) will reduce the number of new lesions. Treatment of these benign lesions are considered cosmetic.  The nature of sun-induced photo-aging and skin cancers is discussed.  Sun avoidance, protective clothing, and the use of 30-SPF sunscreens is advised. Observe closely for skin damage/changes, and call if such occurs.    Nevus  Discussed ABCDE's of nevi.  Monitor for new mole or moles that are becoming bigger, darker, irritated, or developing irregular  borders. Brochure provided. Instructed patient to observe lesion(s) for changes and follow up in clinic if changes are noted. Patient to monitor skin at home for new or changing lesions.     SK (seborrheic keratosis)  These are benign inherited growths without a malignant potential. Reassurance given to patient. No treatment is necessary.     Cherry angioma  These are benign vascular lesions that are inherited.  Treatment is not necessary.    Personal history of skin cancer  Scar  Area(s) of previous NMSC evaluated with no signs of recurrence.    Upper body skin examination performed today including at least 6 points as noted in physical examination. No lesions suspicious for malignancy noted.    Recommend daily sun protection/avoidance and use of at least SPF 30, broad spectrum sunscreen (OTC drug).     Inflamed seborrheic keratosis  Cryosurgery procedure note:    Verbal consent from the patient is obtained including, but not limited to, risk of hypopigmentation/hyperpigmentation, scar, recurrence of lesion. Liquid nitrogen cryosurgery is applied to 1 lesions to produce a freeze injury. The patient is aware that blisters may form and is instructed on wound care with gentle cleansing and use of vaseline ointment to keep moist until healed. The patient is supplied a handout on cryosurgery and is instructed to call if lesions do not completely resolve.             Follow up in about 6 months (around 8/13/2023) for UBSE. Sooner if lesion on right forearm does not resolve

## 2023-02-17 ENCOUNTER — TELEPHONE (OUTPATIENT)
Dept: PAIN MEDICINE | Facility: CLINIC | Age: 88
End: 2023-02-17
Payer: MEDICARE

## 2023-02-20 ENCOUNTER — TELEPHONE (OUTPATIENT)
Dept: PAIN MEDICINE | Facility: CLINIC | Age: 88
End: 2023-02-20
Payer: MEDICARE

## 2023-02-20 ENCOUNTER — HOSPITAL ENCOUNTER (OUTPATIENT)
Facility: HOSPITAL | Age: 88
Discharge: HOME | End: 2023-02-20
Attending: STUDENT IN AN ORGANIZED HEALTH CARE EDUCATION/TRAINING PROGRAM | Admitting: STUDENT IN AN ORGANIZED HEALTH CARE EDUCATION/TRAINING PROGRAM
Payer: MEDICARE

## 2023-02-20 VITALS
OXYGEN SATURATION: 100 % | HEIGHT: 62 IN | SYSTOLIC BLOOD PRESSURE: 139 MMHG | RESPIRATION RATE: 18 BRPM | WEIGHT: 129 LBS | DIASTOLIC BLOOD PRESSURE: 63 MMHG | BODY MASS INDEX: 23.74 KG/M2 | HEART RATE: 63 BPM | TEMPERATURE: 98 F

## 2023-02-20 DIAGNOSIS — M54.12 CERVICAL RADICULOPATHY: Primary | ICD-10-CM

## 2023-02-20 PROCEDURE — 25000003 PHARM REV CODE 250: Performed by: STUDENT IN AN ORGANIZED HEALTH CARE EDUCATION/TRAINING PROGRAM

## 2023-02-20 RX ORDER — SODIUM CHLORIDE 9 MG/ML
500 INJECTION, SOLUTION INTRAVENOUS CONTINUOUS
Status: ACTIVE | OUTPATIENT
Start: 2023-02-20 | End: 2023-02-21

## 2023-02-20 RX ORDER — FENTANYL CITRATE 50 UG/ML
25 INJECTION, SOLUTION INTRAMUSCULAR; INTRAVENOUS ONCE AS NEEDED
Status: DISCONTINUED | OUTPATIENT
Start: 2023-02-20 | End: 2023-05-29

## 2023-02-20 RX ORDER — MIDAZOLAM HYDROCHLORIDE 1 MG/ML
2 INJECTION INTRAMUSCULAR; INTRAVENOUS ONCE AS NEEDED
Status: DISCONTINUED | OUTPATIENT
Start: 2023-02-20 | End: 2023-05-29

## 2023-02-20 RX ADMIN — SODIUM CHLORIDE 500 ML: 9 INJECTION, SOLUTION INTRAVENOUS at 10:02

## 2023-02-20 NOTE — PATIENT INSTRUCTIONS
[facility]  [facility]  [place] service # [phone]    POST-PROCEDURE INSTRUCTIONS:    Today you had an injection that included a steroid medications.  The steroid may or may not have been mixed with a local anesthetic when it was injected.   If the injection was in the neck, you may feel some pressure, numbness, or slight weakness in the arm after the procedure for a short period of time (this is a normal response), if this persists for longer than 1 day please contact our office or go to the emergency room.  If the injection was in the low back, you may feel some pressure, numbness, or slight weakness in the leg after the procedure for a short period of time (this is a normal response), if this persists for longer than 1 day please contact our office or go to the emergency room.  You may get side effects from the steroid.  This is not uncommon.  Symptoms include: elevated blood sugar, elevated blood pressure, headache, flushing, nausea, insomnia.  These symptoms are transient and will resolve within 1-3 days.  If symptoms last longer than this please contact our office or head to the emergency room.  Steroid medications can take anywhere from 3-14 days to take effect (rarely longer).  You may notice that your pain worsens for a short period of time after the injection, this would not be unusual due to the pressure and trauma from the needle.    If you do not have a follow up appointment scheduled, please contact my office (or the office of the physician who referred you for the procedure) to get a post-procedure follow up scheduled 2-4 weeks after the procedure.  This can be done as a virtual visit if that is more convenient for you.      What you need to do:    Keep a record of your response to the injection you had today.    How much relief did you get?   When did the relief start and how long did it last?  Were you able to decrease the use of any of your pain  medications?  Were you able to increase your level of activity?  How long did the relief last?    What to watch out for:    If you experience any of the following symptoms after your procedure, please notify the messaging service immediately (see above for contact information):   fever (increased oral temperature)   bleeding or swelling at the injection site,    drainage, rash or redness at the injection site    possible signs of infection    increased pain at the injection site   worsening of your usual pain   severe headache   new or worsening numbness    new arm and/or leg weakness, or    changes in bowel and/or bladder function: urinating or defecating on yourself and not knowing that you did it.    PLEASE FOLLOW ALL INSTRUCTIONS CAREFULLY     Do not engage in strenuous activity (e.g., lifting or pushing heavy objects or repeated bending) for 24 hours.     Do not take a bath, swim or use Jacuzzi for 24 hours after procedure. (A shower is fine).   Remove any Band-Aids when you get home.    Use cold/ice, as needed for comfort.  We recommend the use of cold therapy alternating on for 20 minutes, off for 20 minutes.    Do not apply direct heat (heating pad or heat packs) to the injection site for 24 hours.     Resume your usual medications, unless instructed otherwise by your Pain Physician.     If you are on warfarin (Coumadin) or other blood thinner, resume this medication as instructed by your prescribing Physician.    IF AT ANY POINT YOU ARE VERY CONCERNED ABOUT YOUR SYMPTOMS, PLEASE GO TO THE EMERGENCY ROOM.    If you develop worsening pain, weakness, numbness, lose bowel or bladder control (i.e., having an accident where you did not even know you had to go to the bathroom and suddenly noticed you soiled yourself), saddle anesthesia (a loss of sensation restricted to the area of the buttocks, anus and between the legs -- i.e., those parts of your body that would touch a saddle if you were sitting on one) you  need to go immediately to the emergency department for evaluation and treatment.    ----------------------------------------------------------------------------------------------------------------------------------------------------------------  If you received Sedation please read the following instructions:  POST SEDATION INSTRUCTIONS    Today you received intravenous medication (also known as sedation) that was used to help you relax and/or decrease discomfort during your procedure. This medication will be acting in your body for the next 24 hours, so you might feel a little tired or sleepy. This feeling will slowly wear off.   Common side effects associated with these medications include: drowsiness, dizziness, sleepiness, confusion, feeling excited, difficulty remembering things, lack of steadiness with walking or balance, loss of fine muscle control, slowed reflexes, difficulty focusing, and blurred vision.  Some over-the-counter and prescription medications (e.g., muscle relaxants, opioids, mood-altering medications, sedatives/hypnotics, antihistamines) can interact with the intravenous medication you received and cause an increased risk of the side effects listed above in addition to other potentially life threatening side effects. Use extreme caution if you are taking such medications, and consult with your Pain Physician or prescribing physician if you have any questions.  For the next 12-24 hours:    DO NOT--Drive a car, operate machinery or power tools   DO NOT--Drink any alcoholic beverages (not even beer), they may dangerously increase the risk of side effects.    DO NOT--Make any important legal or business decisions or sign important documents.  We advise you to have someone to assist you at home. Move slowly and carefully. Do not make sudden changes in position. Be aware of dizziness or light-headedness and move accordingly.   If you seek medical treatment within 24 hours, let the nurse or doctor  caring for you know that you have received the above medications. If you have any questions or concerns related to your sedation or treatment today please contact us.

## 2023-02-20 NOTE — PLAN OF CARE
Pre op checklist complete. Pt resting in bed with call light in reach. All questions answered. Pt belongings at bedside and plan for family to hold. Family brought to bedside. Pt needs site mp/surgery consent/updated.

## 2023-02-20 NOTE — PLAN OF CARE
Dr. Buenrostro notified pt took aspirin yesterday 2/19. MD at bedside discussing with pt and family.

## 2023-02-23 ENCOUNTER — TELEPHONE (OUTPATIENT)
Dept: PAIN MEDICINE | Facility: CLINIC | Age: 88
End: 2023-02-23
Payer: MEDICARE

## 2023-02-23 NOTE — TELEPHONE ENCOUNTER
Staff spoke with the patient regarding their procedure with Dr. Cisneros scheduled on 02/24/2023; the patient was provided the Arrival Information and scheduled time 9:30 AM.     The patient verbalized understanding.    Thank you,

## 2023-02-24 ENCOUNTER — HOSPITAL ENCOUNTER (OUTPATIENT)
Facility: HOSPITAL | Age: 88
Discharge: HOME | End: 2023-02-24
Attending: STUDENT IN AN ORGANIZED HEALTH CARE EDUCATION/TRAINING PROGRAM | Admitting: STUDENT IN AN ORGANIZED HEALTH CARE EDUCATION/TRAINING PROGRAM
Payer: MEDICARE

## 2023-02-24 VITALS
BODY MASS INDEX: 23.74 KG/M2 | DIASTOLIC BLOOD PRESSURE: 58 MMHG | OXYGEN SATURATION: 100 % | WEIGHT: 129 LBS | TEMPERATURE: 98 F | HEART RATE: 61 BPM | HEIGHT: 62 IN | SYSTOLIC BLOOD PRESSURE: 128 MMHG | RESPIRATION RATE: 20 BRPM

## 2023-02-24 DIAGNOSIS — M54.12 CERVICAL RADICULOPATHY: Primary | ICD-10-CM

## 2023-02-24 PROCEDURE — 63600175 PHARM REV CODE 636 W HCPCS: Performed by: STUDENT IN AN ORGANIZED HEALTH CARE EDUCATION/TRAINING PROGRAM

## 2023-02-24 PROCEDURE — 99900035 HC TECH TIME PER 15 MIN (STAT)

## 2023-02-24 PROCEDURE — 62321 PR INJ CERV/THORAC, W/GUIDANCE: ICD-10-PCS | Mod: ,,, | Performed by: STUDENT IN AN ORGANIZED HEALTH CARE EDUCATION/TRAINING PROGRAM

## 2023-02-24 PROCEDURE — 25000003 PHARM REV CODE 250: Performed by: STUDENT IN AN ORGANIZED HEALTH CARE EDUCATION/TRAINING PROGRAM

## 2023-02-24 PROCEDURE — 62321 NJX INTERLAMINAR CRV/THRC: CPT | Performed by: STUDENT IN AN ORGANIZED HEALTH CARE EDUCATION/TRAINING PROGRAM

## 2023-02-24 PROCEDURE — 94761 N-INVAS EAR/PLS OXIMETRY MLT: CPT

## 2023-02-24 PROCEDURE — 99152 PR MOD CONSCIOUS SEDATION, SAME PHYS, 5+ YRS, FIRST 15 MIN: ICD-10-PCS | Mod: ,,, | Performed by: STUDENT IN AN ORGANIZED HEALTH CARE EDUCATION/TRAINING PROGRAM

## 2023-02-24 PROCEDURE — 25500020 PHARM REV CODE 255: Performed by: STUDENT IN AN ORGANIZED HEALTH CARE EDUCATION/TRAINING PROGRAM

## 2023-02-24 PROCEDURE — 62321 NJX INTERLAMINAR CRV/THRC: CPT | Mod: ,,, | Performed by: STUDENT IN AN ORGANIZED HEALTH CARE EDUCATION/TRAINING PROGRAM

## 2023-02-24 PROCEDURE — 99152 MOD SED SAME PHYS/QHP 5/>YRS: CPT | Mod: ,,, | Performed by: STUDENT IN AN ORGANIZED HEALTH CARE EDUCATION/TRAINING PROGRAM

## 2023-02-24 PROCEDURE — 99152 MOD SED SAME PHYS/QHP 5/>YRS: CPT | Performed by: STUDENT IN AN ORGANIZED HEALTH CARE EDUCATION/TRAINING PROGRAM

## 2023-02-24 RX ORDER — DIPHENHYDRAMINE HYDROCHLORIDE 50 MG/ML
25 INJECTION INTRAMUSCULAR; INTRAVENOUS ONCE
Status: COMPLETED | OUTPATIENT
Start: 2023-02-24 | End: 2023-02-24

## 2023-02-24 RX ORDER — SODIUM CHLORIDE 9 MG/ML
500 INJECTION, SOLUTION INTRAVENOUS CONTINUOUS
Status: DISCONTINUED | OUTPATIENT
Start: 2023-02-24 | End: 2023-02-24 | Stop reason: HOSPADM

## 2023-02-24 RX ORDER — DIPHENHYDRAMINE HCL 25 MG
25 CAPSULE ORAL ONCE
Status: DISCONTINUED | OUTPATIENT
Start: 2023-02-24 | End: 2023-02-24 | Stop reason: HOSPADM

## 2023-02-24 RX ORDER — FENTANYL CITRATE 50 UG/ML
25 INJECTION, SOLUTION INTRAMUSCULAR; INTRAVENOUS ONCE AS NEEDED
Status: DISCONTINUED | OUTPATIENT
Start: 2023-02-24 | End: 2023-02-24 | Stop reason: HOSPADM

## 2023-02-24 RX ORDER — MIDAZOLAM HYDROCHLORIDE 1 MG/ML
1 INJECTION INTRAMUSCULAR; INTRAVENOUS ONCE AS NEEDED
Status: COMPLETED | OUTPATIENT
Start: 2023-02-24 | End: 2023-02-24

## 2023-02-24 RX ORDER — DEXAMETHASONE SODIUM PHOSPHATE 4 MG/ML
INJECTION, SOLUTION INTRA-ARTICULAR; INTRALESIONAL; INTRAMUSCULAR; INTRAVENOUS; SOFT TISSUE
Status: DISCONTINUED | OUTPATIENT
Start: 2023-02-24 | End: 2023-02-24 | Stop reason: HOSPADM

## 2023-02-24 RX ORDER — LIDOCAINE HYDROCHLORIDE 10 MG/ML
INJECTION, SOLUTION EPIDURAL; INFILTRATION; INTRACAUDAL; PERINEURAL
Status: DISCONTINUED | OUTPATIENT
Start: 2023-02-24 | End: 2023-02-24 | Stop reason: HOSPADM

## 2023-02-24 NOTE — PLAN OF CARE
VSS. Pt able to tolerate oral liquids.  pt denies c/o pain. Band-aid intact.  No distress noted. Pt states she is ready for D/C. D/C instructions reviewed with pt and family member, verbalized understanding.

## 2023-02-24 NOTE — PATIENT INSTRUCTIONS
[facility]  [facility]  [employer] service # [phone]    POST-PROCEDURE INSTRUCTIONS:    Today you had an injection that included a steroid medications.  The steroid may or may not have been mixed with a local anesthetic when it was injected.   If the injection was in the neck, you may feel some pressure, numbness, or slight weakness in the arm after the procedure for a short period of time (this is a normal response), if this persists for longer than 1 day please contact our office or go to the emergency room.  If the injection was in the low back, you may feel some pressure, numbness, or slight weakness in the leg after the procedure for a short period of time (this is a normal response), if this persists for longer than 1 day please contact our office or go to the emergency room.  You may get side effects from the steroid.  This is not uncommon.  Symptoms include: elevated blood sugar, elevated blood pressure, headache, flushing, nausea, insomnia.  These symptoms are transient and will resolve within 1-3 days.  If symptoms last longer than this please contact our office or head to the emergency room.  Steroid medications can take anywhere from 3-14 days to take effect (rarely longer).  You may notice that your pain worsens for a short period of time after the injection, this would not be unusual due to the pressure and trauma from the needle.    If you do not have a follow up appointment scheduled, please contact my office (or the office of the physician who referred you for the procedure) to get a post-procedure follow up scheduled 2-4 weeks after the procedure.  This can be done as a virtual visit if that is more convenient for you.      What you need to do:    Keep a record of your response to the injection you had today.    How much relief did you get?   When did the relief start and how long did it last?  Were you able to decrease the use of any of your pain  medications?  Were you able to increase your level of activity?  How long did the relief last?    What to watch out for:    If you experience any of the following symptoms after your procedure, please notify the messaging service immediately (see above for contact information):   fever (increased oral temperature)   bleeding or swelling at the injection site,    drainage, rash or redness at the injection site    possible signs of infection    increased pain at the injection site   worsening of your usual pain   severe headache   new or worsening numbness    new arm and/or leg weakness, or    changes in bowel and/or bladder function: urinating or defecating on yourself and not knowing that you did it.    PLEASE FOLLOW ALL INSTRUCTIONS CAREFULLY     Do not engage in strenuous activity (e.g., lifting or pushing heavy objects or repeated bending) for 24 hours.     Do not take a bath, swim or use Jacuzzi for 24 hours after procedure. (A shower is fine).   Remove any Band-Aids when you get home.    Use cold/ice, as needed for comfort.  We recommend the use of cold therapy alternating on for 20 minutes, off for 20 minutes.    Do not apply direct heat (heating pad or heat packs) to the injection site for 24 hours.     Resume your usual medications, unless instructed otherwise by your Pain Physician.     If you are on warfarin (Coumadin) or other blood thinner, resume this medication as instructed by your prescribing Physician.    IF AT ANY POINT YOU ARE VERY CONCERNED ABOUT YOUR SYMPTOMS, PLEASE GO TO THE EMERGENCY ROOM.    If you develop worsening pain, weakness, numbness, lose bowel or bladder control (i.e., having an accident where you did not even know you had to go to the bathroom and suddenly noticed you soiled yourself), saddle anesthesia (a loss of sensation restricted to the area of the buttocks, anus and between the legs -- i.e., those parts of your body that would touch a saddle if you were sitting on one) you  need to go immediately to the emergency department for evaluation and treatment.    ----------------------------------------------------------------------------------------------------------------------------------------------------------------  If you received Sedation please read the following instructions:  POST SEDATION INSTRUCTIONS    Today you received intravenous medication (also known as sedation) that was used to help you relax and/or decrease discomfort during your procedure. This medication will be acting in your body for the next 24 hours, so you might feel a little tired or sleepy. This feeling will slowly wear off.   Common side effects associated with these medications include: drowsiness, dizziness, sleepiness, confusion, feeling excited, difficulty remembering things, lack of steadiness with walking or balance, loss of fine muscle control, slowed reflexes, difficulty focusing, and blurred vision.  Some over-the-counter and prescription medications (e.g., muscle relaxants, opioids, mood-altering medications, sedatives/hypnotics, antihistamines) can interact with the intravenous medication you received and cause an increased risk of the side effects listed above in addition to other potentially life threatening side effects. Use extreme caution if you are taking such medications, and consult with your Pain Physician or prescribing physician if you have any questions.  For the next 12-24 hours:    DO NOT--Drive a car, operate machinery or power tools   DO NOT--Drink any alcoholic beverages (not even beer), they may dangerously increase the risk of side effects.    DO NOT--Make any important legal or business decisions or sign important documents.  We advise you to have someone to assist you at home. Move slowly and carefully. Do not make sudden changes in position. Be aware of dizziness or light-headedness and move accordingly.   If you seek medical treatment within 24 hours, let the nurse or doctor  caring for you know that you have received the above medications. If you have any questions or concerns related to your sedation or treatment today please contact us.

## 2023-02-24 NOTE — DISCHARGE SUMMARY
[facility] - Surgery (Hospital)  Discharge Note  Short Stay    Procedure(s) (LRB):  HEIDI C7-T1 (N/A)      OUTCOME: Patient tolerated treatment/procedure well without complication and is now ready for discharge.    DISPOSITION: Home or Self Care    FINAL DIAGNOSIS:  <principal problem not specified>    FOLLOWUP: In clinic    DISCHARGE INSTRUCTIONS:  No discharge procedures on file.     TIME SPENT ON DISCHARGE: 10 minutes

## 2023-02-24 NOTE — OP NOTE
Cervical Interlaminar Epidural Steroid Injection under Fluoroscopic Guidance    The procedure, risks, benefits, and options were discussed with the patient. There are no contraindications to the procedure. The patent expressed understanding and agreed to the procedure. Informed written consent was obtained prior to the start of the procedure and can be found in the patient's chart.     PATIENT NAME: Gerda Lai   MRN: [id number]     DATE OF PROCEDURE: 02/24/2023    PROCEDURE: Cervical Interlaminar Epidural Steroid Injection C7/T1 under Fluoroscopic Guidance    PRE-OP DIAGNOSIS: Cervical radiculopathy [M54.12] Cervical radiculopathy [M54.12]    POST-OP DIAGNOSIS: Same    PHYSICIAN: Andi Cisneros DO     ASSISTANTS: none    MEDICATIONS INJECTED: Preservative-free Decadron 10mg with 3cc of preservative free normal saline    LOCAL ANESTHETIC INJECTED: Xylocaine 2%     SEDATION: Versed 1mg + IV benadryl 25mg   Event Time In   Sedation Start 1023   Sedation End 1043       ESTIMATED BLOOD LOSS: None    COMPLICATIONS: None    TECHNIQUE: Time-out was performed to identify the patient and procedure to be performed. With the patient laying in a prone position, the surgical area was prepped and draped in the usual sterile fashion using ChloraPrep and a fenestrated drape. The level was determined under fluoroscopy guidance. Skin anesthesia was achieved by injecting Lidocaine 2% over the injection site.  The interlaminar space was then approached with a 20 gauge, 3.5 inch Tuohy needle that was introduced under fluoroscopic guidance with AP, lateral and/or contralateral oblique imaging. Once the Ligamentum flavum was encountered loss of resistance to saline was used to enter the epidural space. With positive loss of resistance and negative aspiration for CSF or Blood, contrast dye  Omnipaque (300mg/mL) was injected to confirm placement and there was no vascular runoff. Then 4 mL of the medication mixture listed above was  then injected slowly. Displacement of the radio opaque contrast after injection of the medication confirmed that the medication went into the area of the epidural space. The needles were removed, and bleeding was nil. A sterile dressing was applied. No specimens collected. The patient tolerated the procedure well.       The patient was monitored after the procedure in the recovery area. They were given post-procedure and discharge instructions to follow at home. The patient was discharged in a stable condition.      Andi Wilson DO

## 2023-02-24 NOTE — PLAN OF CARE
Pre Op checklist complete. Pt resting in bed with call light in reach. All questions answered. Pt belongings at bedside and will place them in a locker. Pt niece brought to bedside. Pt still needs site mp/updated/ surgery consent.

## 2023-03-01 ENCOUNTER — PATIENT MESSAGE (OUTPATIENT)
Dept: DERMATOLOGY | Facility: CLINIC | Age: 88
End: 2023-03-01
Payer: MEDICARE

## 2023-03-02 ENCOUNTER — OFFICE VISIT (OUTPATIENT)
Dept: DERMATOLOGY | Facility: CLINIC | Age: 88
End: 2023-03-02
Payer: MEDICARE

## 2023-03-02 DIAGNOSIS — D48.5 NEOPLASM OF UNCERTAIN BEHAVIOR OF SKIN: Primary | ICD-10-CM

## 2023-03-02 PROCEDURE — 99999 PR PBB SHADOW E&M-EST. PATIENT-LVL III: CPT | Mod: PBBFAC,,, | Performed by: DERMATOLOGY

## 2023-03-02 PROCEDURE — 99499 NO LOS: ICD-10-PCS | Mod: S$PBB,,, | Performed by: DERMATOLOGY

## 2023-03-02 PROCEDURE — 11102 PR TANGENTIAL BIOPSY, SKIN, SINGLE LESION: ICD-10-PCS | Mod: S$PBB,,, | Performed by: DERMATOLOGY

## 2023-03-02 PROCEDURE — 99999 PR PBB SHADOW E&M-EST. PATIENT-LVL III: ICD-10-PCS | Mod: PBBFAC,,, | Performed by: DERMATOLOGY

## 2023-03-02 PROCEDURE — 88305 TISSUE EXAM BY PATHOLOGIST: CPT | Mod: 26,,, | Performed by: PATHOLOGY

## 2023-03-02 PROCEDURE — 88305 TISSUE EXAM BY PATHOLOGIST: ICD-10-PCS | Mod: 26,,, | Performed by: PATHOLOGY

## 2023-03-02 PROCEDURE — 88305 TISSUE EXAM BY PATHOLOGIST: CPT | Performed by: PATHOLOGY

## 2023-03-02 PROCEDURE — 11102 TANGNTL BX SKIN SINGLE LES: CPT | Mod: PBBFAC,PO | Performed by: DERMATOLOGY

## 2023-03-02 PROCEDURE — 11102 TANGNTL BX SKIN SINGLE LES: CPT | Mod: S$PBB,,, | Performed by: DERMATOLOGY

## 2023-03-02 PROCEDURE — 99213 OFFICE O/P EST LOW 20 MIN: CPT | Mod: PBBFAC,PO,25 | Performed by: DERMATOLOGY

## 2023-03-02 PROCEDURE — 99499 UNLISTED E&M SERVICE: CPT | Mod: S$PBB,,, | Performed by: DERMATOLOGY

## 2023-03-02 NOTE — PATIENT INSTRUCTIONS
Your doctor performed a skin biopsy today as she is concerned the lesion may be a skin cancer. Skin cancer is different than other cancers in that if it is addressed early, it can be cured with skin surgery.  As discussed at your appointment, if the lesion is a skin cancer your will be referred for Mohs Surgery.  Your pathology report, photos, and note will be messaged to your Mohs Department. Mohs surgery is a dermatologic subspecialty.  The surgeon is specially trained to remove the cancerous tissue, process it under the microscope while you wait, and ensure the skin cancer is removed with clear margins. The surgeon is then trained to repair the defect in a cosmetically acceptable way.  This is the standard of care for many skin cancers and [facility] has an excellent team that works with our dermatologists to offer this service.

## 2023-03-02 NOTE — PROGRESS NOTES
Subjective:       Patient ID:  Gerda Lai is a 89 y.o. female who presents for   Chief Complaint   Patient presents with    Lesion     R arm      Pt c/ non healing lesion to R arm. Prev tx with cryo at last OV 2/13/23    Lesion    Review of Systems   Skin:  Negative for daily sunscreen use, activity-related sunscreen use and recent sunburn.   Hematologic/Lymphatic: Bruises/bleeds easily.      Objective:    Physical Exam   Constitutional: She appears well-developed and well-nourished. No distress.   Neurological: She is alert and oriented to person, place, and time. She is not disoriented.   Psychiatric: She has a normal mood and affect.   Skin:   Areas Examined (abnormalities noted in diagram):   RUE Inspected                  Diagram Legend     Erythematous scaling macule/papule c/w actinic keratosis       Vascular papule c/w angioma      Pigmented verrucoid papule/plaque c/w seborrheic keratosis      Yellow umbilicated papule c/w sebaceous hyperplasia      Irregularly shaped tan macule c/w lentigo     1-2 mm smooth white papules consistent with Milia      Movable subcutaneous cyst with punctum c/w epidermal inclusion cyst      Subcutaneous movable cyst c/w pilar cyst      Firm pink to brown papule c/w dermatofibroma      Pedunculated fleshy papule(s) c/w skin tag(s)      Evenly pigmented macule c/w junctional nevus     Mildly variegated pigmented, slightly irregular-bordered macule c/w mildly atypical nevus      Flesh colored to evenly pigmented papule c/w intradermal nevus       Pink pearly papule/plaque c/w basal cell carcinoma      Erythematous hyperkeratotic cursted plaque c/w SCC      Surgical scar with no sign of skin cancer recurrence      Open and closed comedones      Inflammatory papules and pustules      Verrucoid papule consistent consistent with wart     Erythematous eczematous patches and plaques     Dystrophic onycholytic nail with subungual debris c/w onychomycosis     Umbilicated papule     Erythematous-base heme-crusted tan verrucoid plaque consistent with inflamed seborrheic keratosis     Erythematous Silvery Scaling Plaque c/w Psoriasis     See annotation      Assessment / Plan:      Pathology Orders:       Normal Orders This Visit    Specimen to Pathology, Dermatology     Questions:    Procedure Type: Dermatology and skin neoplasms    Number of Specimens: 1    ------------------------: -------------------------    Spec 1 Procedure: Biopsy    Spec 1 Clinical Impression: r/o SCC    Spec 1 Source: right lower forearm    Release to patient:           Neoplasm of uncertain behavior of skin  Shave biopsy procedure note: - shave debulking of lesion     Shave biopsy performed after verbal consent including risk of infection, scar, recurrence, need for additional treatment of site. Area prepped with alcohol, anesthetized with approximately 1.0cc of 1% lidocaine with epinephrine. Lesional tissue shaved with razor blade. Hemostasis achieved with application of aluminum chloride followed by hyfrecation and MOnsels. No complications. Dressing applied. Wound care explained.  If biopsy positive for malignancy, refer to Dr. Benoit for Mohs surgery consultation.   -     Specimen to Pathology, Dermatology             Follow up for prn bx report.

## 2023-03-04 DIAGNOSIS — I10 HYPERTENSION, ESSENTIAL: Chronic | ICD-10-CM

## 2023-03-04 RX ORDER — LORAZEPAM 0.5 MG/1
TABLET ORAL
Qty: 30 TABLET | Refills: 0 | Status: SHIPPED | OUTPATIENT
Start: 2023-03-04 | End: 2023-05-14

## 2023-03-04 NOTE — TELEPHONE ENCOUNTER
No new care gaps identified.  [facility] Embedded Care Gaps. Reference number: [id number]. 3/04/2023   12:51:47 PM CST

## 2023-03-06 ENCOUNTER — OFFICE VISIT (OUTPATIENT)
Dept: DERMATOLOGY | Facility: CLINIC | Age: 88
End: 2023-03-06
Payer: MEDICARE

## 2023-03-06 DIAGNOSIS — L03.113 CELLULITIS OF RIGHT ARM: Primary | ICD-10-CM

## 2023-03-06 PROCEDURE — 99024 PR POST-OP FOLLOW-UP VISIT: ICD-10-PCS | Mod: POP,,, | Performed by: DERMATOLOGY

## 2023-03-06 PROCEDURE — 99024 POSTOP FOLLOW-UP VISIT: CPT | Mod: POP,,, | Performed by: DERMATOLOGY

## 2023-03-06 PROCEDURE — 99999 PR PBB SHADOW E&M-EST. PATIENT-LVL I: CPT | Mod: PBBFAC,,, | Performed by: DERMATOLOGY

## 2023-03-06 PROCEDURE — 99211 OFF/OP EST MAY X REQ PHY/QHP: CPT | Mod: PBBFAC,PO | Performed by: DERMATOLOGY

## 2023-03-06 PROCEDURE — 99999 PR PBB SHADOW E&M-EST. PATIENT-LVL I: ICD-10-PCS | Mod: PBBFAC,,, | Performed by: DERMATOLOGY

## 2023-03-06 RX ORDER — MUPIROCIN 20 MG/G
OINTMENT TOPICAL
Qty: 30 G | Refills: 3 | Status: ON HOLD | OUTPATIENT
Start: 2023-03-06 | End: 2023-06-05 | Stop reason: HOSPADM

## 2023-03-06 RX ORDER — CEPHALEXIN 500 MG/1
CAPSULE ORAL
Qty: 21 CAPSULE | Refills: 0 | Status: SHIPPED | OUTPATIENT
Start: 2023-03-06 | End: 2023-03-28

## 2023-03-06 NOTE — PROGRESS NOTES
Pt with erythema /violaceous plaque of right  forearm superior to bx site.   Difficult to appreciate if this is purpura v cellulitis  Pt is very concerned but has minimal pain in site  Will rx keflex and bactroban to ensure no infection     F/u prn bx report

## 2023-03-07 ENCOUNTER — TELEPHONE (OUTPATIENT)
Dept: DERMATOLOGY | Facility: CLINIC | Age: 88
End: 2023-03-07
Payer: MEDICARE

## 2023-03-07 ENCOUNTER — PATIENT MESSAGE (OUTPATIENT)
Dept: INTERNAL MEDICINE | Facility: CLINIC | Age: 88
End: 2023-03-07
Payer: MEDICARE

## 2023-03-07 ENCOUNTER — OFFICE VISIT (OUTPATIENT)
Dept: PRIMARY CARE CLINIC | Facility: CLINIC | Age: 88
End: 2023-03-07
Payer: MEDICARE

## 2023-03-07 VITALS
OXYGEN SATURATION: 98 % | HEART RATE: 72 BPM | DIASTOLIC BLOOD PRESSURE: 74 MMHG | SYSTOLIC BLOOD PRESSURE: 117 MMHG | BODY MASS INDEX: 25.04 KG/M2 | WEIGHT: 136.88 LBS

## 2023-03-07 DIAGNOSIS — N18.32 STAGE 3B CHRONIC KIDNEY DISEASE: Chronic | ICD-10-CM

## 2023-03-07 DIAGNOSIS — F41.9 ANXIETY: Primary | Chronic | ICD-10-CM

## 2023-03-07 PROCEDURE — 99999 PR PBB SHADOW E&M-EST. PATIENT-LVL IV: CPT | Mod: PBBFAC,,, | Performed by: NURSE PRACTITIONER

## 2023-03-07 PROCEDURE — 99214 PR OFFICE/OUTPT VISIT, EST, LEVL IV, 30-39 MIN: ICD-10-PCS | Mod: S$PBB,,, | Performed by: NURSE PRACTITIONER

## 2023-03-07 PROCEDURE — 99214 OFFICE O/P EST MOD 30 MIN: CPT | Mod: S$PBB,,, | Performed by: NURSE PRACTITIONER

## 2023-03-07 PROCEDURE — 99999 PR PBB SHADOW E&M-EST. PATIENT-LVL IV: ICD-10-PCS | Mod: PBBFAC,,, | Performed by: NURSE PRACTITIONER

## 2023-03-07 PROCEDURE — 99214 OFFICE O/P EST MOD 30 MIN: CPT | Mod: PBBFAC | Performed by: NURSE PRACTITIONER

## 2023-03-07 NOTE — TELEPHONE ENCOUNTER
----- Message from Syeda DANIELS Route sent at 3/7/2023  1:51 PM CST -----  Regarding: perscription  Contact: [phone]  Pt calling to speak to someone regarding medication that was supposed to be sent to the pharmacist. Please respond at Pt calling to speak to someone regarding medication that was supposed to be sent to the pharmacist. Please respond at [phone]    [facility]/pharmacy #[id number] - [place] - [address]  [address]  [place] [address]  Phone: [phone] Fax: [phone]

## 2023-03-07 NOTE — PROGRESS NOTES
[facility]    Chief Complaint      Chief Complaint   Patient presents with    Leg Swelling    Cough    Mass     Right ear and left armpit     History of Present Illness      Gerda Lai is a 89 y.o. female patient of Dr. Beltran who is new to me and presents today for c/o lesion to her left ear that was bleeding last night, a bump to her left axilla area, wants to discuss her blood pressure meds,      Patient reports that she sees Dr. Camarena with cardiology but has not seen him in a while, Dr. Beltran her PCP has been managing her blood pressure medications. Patient reports she  was taking felodipine 5 mg b.I.d., but was not managing her blood pressures in the morning.  Patient reports PCP increased to 10 mg q.a.m. patient reports that now her blood pressure is dropping too low.  Making her feel weak.  I suggested possible taking 1-1/2 5 mg to equal 7.5 mg tabs every morning, but she would have to clear this with her PCP 1st.   This may possibly also help with the swelling in her ankles. Encouraged to stay hydrated  With water and low-sodium diet   She is very concerned about a bump to her left ear that was bleeding yesterday as well as a tender bump in her left armpit.  She saw Derm yesterday but forgot to mention it.    Health Maintenance   Topic Date Due    DEXA Scan  01/28/2024    TETANUS VACCINE  08/04/2025    Lipid Panel  09/15/2027    Aspirin/Antiplatelet Therapy  Discontinued       Past Medical History:   Diagnosis Date    Anxiety     Arthritis     Basal cell carcinoma 09/2016    right post auricular neck     Breast cancer 1992    right    Cataract     Fibromyalgia     History of measles as a child     Hyperlipidemia     Hypertension     Personal history of colonic polyps     Pneumonia     SCC (squamous cell carcinoma) 2015    R chest    SCC (squamous cell carcinoma) 2016    left medial shoulder    SCC (squamous cell carcinoma) 2017    left knee    SCC (squamous cell carcinoma) 2022    L  upper chest, R upper arm KA types    Shingles     Squamous cell carcinoma 2015    right forearm    Thyroid disease     Vaginitis        Past Surgical History:   Procedure Laterality Date    ADENOIDECTOMY      BREAST BIOPSY Left     Excisional bx, benign    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    CARPAL TUNNEL RELEASE Right 3/16/2021    Procedure: RELEASE, CARPAL TUNNEL;  Surgeon: Barbara Aldridge MD;  Location: [facility] OR;  Service: Orthopedics;  Laterality: Right;    CATARACT EXTRACTION W/  INTRAOCULAR LENS IMPLANT Bilateral     EPIDURAL STEROID INJECTION N/A 2/24/2023    Procedure: HEIDI C7-T1;  Surgeon: Andi Cisneros DO;  Location: [facility] OR;  Service: Pain Management;  Laterality: N/A;    EYE SURGERY      HAND SURGERY      INJECTION OF ANESTHETIC AGENT AROUND MEDIAL BRANCH NERVES INNERVATING CERVICAL FACET JOINT N/A 1/4/2022    Procedure: Cervical Medial Branch Block C5-6, C6-7 (No Sedation);  Surgeon: Himanshu Blackmon Jr., MD;  Location: [facility] PAIN MGT;  Service: Pain Management;  Laterality: N/A;    INJECTION OF ANESTHETIC AGENT AROUND MEDIAL BRANCH NERVES INNERVATING LUMBAR FACET JOINT Left 11/18/2021    Procedure: Cervical Medial Branch Block Left C5-6, C6-7 (IV Sedation);  Surgeon: Himanshu Blackmon Jr., MD;  Location: [facility] PAIN [facility]GT;  Service: Pain Management;  Laterality: Left;    JOINT REPLACEMENT Right     ELZBIETA    KNEE ARTHROPLASTY Left 8/10/2020    Procedure: ARTHROPLASTY, KNEE-ADE NEXGEN/CEMENTED TIBIA;  Surgeon: Kalin Pacheco MD;  Location: [facility] OR;  Service: Orthopedics;  Laterality: Left;    RADIOFREQUENCY THERMAL COAGULATION OF MEDIAL BRANCH OF POSTERIOR RAMUS OF CERVICAL SPINAL NERVE Left 3/8/2022    Procedure: RADIOFREQUENCY THERMAL COAGULATION, NERVE, SPINAL, CERVICAL, LEFT C5-6 AND C6-7;  Surgeon: Himanshu Blackmon Jr., MD;  Location: [facility] PAIN [facility]GT;  Service: Pain Management;  Laterality: Left;  NO PACEMAKER   ASA    thyriodectomy      partial    tonsillectomy      TONSILLECTOMY       TOTAL HIP ARTHROPLASTY      right    Yag  Left        family history includes Breast cancer in her mother; Cancer in her mother and paternal aunt; Glaucoma in her paternal grandmother; Heart disease in her father and paternal aunt; Stroke in her paternal grandfather.    Social History     Tobacco Use    Smoking status: Never     Passive exposure: Never    Smokeless tobacco: Never   Substance Use Topics    Alcohol use: Yes     Comment: socially - celebrations only    Drug use: Never       Review of Systems   Constitutional:  Negative for chills, fever and malaise/fatigue.   HENT:  Negative for ear pain.    Eyes:  Negative for blurred vision.   Respiratory:  Positive for cough. Negative for shortness of breath.    Cardiovascular:  Positive for leg swelling. Negative for chest pain and palpitations.   Gastrointestinal:  Negative for nausea and vomiting.   Genitourinary:  Negative for dysuria.   Neurological:  Negative for dizziness, weakness and headaches.   Psychiatric/Behavioral:  Negative for depression.       Outpatient Encounter Medications as of 3/7/2023   Medication Sig Dispense Refill    acetaminophen (TYLENOL) 650 MG TbSR Take 1 tablet (650 mg total) by mouth every 8 (eight) hours as needed. 120 tablet 0    albuterol (PROVENTIL/VENTOLIN HFA) 90 mcg/actuation inhaler INHALE 1 TO 2 PUFFS BY MOUTH EVERY 6 HOURS AS NEEDED FOR WHEEZE 8.5 g 5    aspirin (ECOTRIN) 81 MG EC tablet TAKE 1 TABLET BY MOUTH EVERY DAY 90 tablet 3    cephALEXin (KEFLEX) 500 MG capsule Sig 1 po tid x 7 days 21 capsule 0    coenzyme Q10 100 mg capsule Take 100 mg by mouth once daily.      glucosamine-chondroitin 500-400 mg tablet Take 1 tablet by mouth once daily.       labetaloL (NORMODYNE) 100 MG tablet Take 100 mg by mouth 2 (two) times daily.      lactase (LACTAID) 3,000 unit tablet Take 1 tablet by mouth 3 (three) times daily as needed.      levoFLOXacin (LEVAQUIN) 500 MG tablet Take 1 tablet (500 mg total) by mouth once daily. 7 tablet  0    LORazepam (ATIVAN) 0.5 MG tablet TAKE 1/2 TABLET TO 1 TABLET BY MOUTH EVERY 12 HOURS AS NEEDED FOR ANXIETY 30 tablet 0    multivitamin capsule Take 1 capsule by mouth once daily.      mupirocin (BACTROBAN) 2 % ointment AAA bid 30 g 3    omeprazole (PRILOSEC OTC) 20 MG tablet Take 20 mg by mouth once daily.      polymyxin B sulf-trimethoprim (POLYTRIM) 10,000 unit- 1 mg/mL Drop Place 1 drop into both eyes every 6 (six) hours. 10 mL 0    pravastatin (PRAVACHOL) 80 MG tablet Take 1 tablet (80 mg total) by mouth once daily. 90 tablet 3    TURMERIC ORAL Take 500 mg by mouth once daily.       vitamin D (VITAMIN D3) 1000 units Tab Take 1,000 Units by mouth once daily.      [DISCONTINUED] aliskiren (TEKTURNA) 300 MG Tab TAKE 1 TABLET BY MOUTH EVERY DAY 90 tablet 1    [DISCONTINUED] felodipine (PLENDIL) 10 MG 24 hr tablet Take 1 tablet (10 mg total) by mouth once daily. 90 tablet 3     Facility-Administered Encounter Medications as of 3/7/2023   Medication Dose Route Frequency Provider Last Rate Last Admin    fentaNYL 50 mcg/mL injection 25 mcg  25 mcg Intravenous Once PRN Andi Cisneros,         midazolam (VERSED) 1 mg/mL injection 2 mg  2 mg Intravenous Once PRN Andi Cisneros,             Review of patient's allergies indicates:   Allergen Reactions    Sulfa (sulfonamide antibiotics) Rash    Clarithromycin Other (See Comments)     Weak, extreme fatigue. dizziness    Flexeril [cyclobenzaprine] Other (See Comments)     Dizziness    Iodine and iodide containing products     Lisinopril Other (See Comments)     Cough and sensation of throat swelling/?angioedema    Losartan Rash    Metoprolol Swelling     Tightness in throat    Tramadol Other (See Comments)     Dizzy and weak    Verapamil (bulk) Palpitations    Voltaren [diclofenac sodium] Other (See Comments)     Drops blood pressure       Physical Exam      Vital Signs  Pulse: 72  SpO2: 98 %  BP: 117/74  Height and Weight  Weight: 62.1 kg (136 lb 14.5  oz)    Physical Exam  Vitals and nursing note reviewed.   Constitutional:       General: She is not in acute distress.     Appearance: Normal appearance. She is not ill-appearing.   HENT:      Head: Normocephalic and atraumatic.      Ears:      Comments: Small round raised lesion to left ear merlyn  Cardiovascular:      Rate and Rhythm: Normal rate and regular rhythm.      Heart sounds: Normal heart sounds.   Pulmonary:      Effort: Pulmonary effort is normal. No respiratory distress.      Breath sounds: Normal breath sounds.   Musculoskeletal:         General: Swelling present.      Comments: Trace swelling noted to bilateral ankles   Skin:     General: Skin is warm and dry.      Findings: Erythema present.      Comments: Small red raised lesion appears to be folliculitis   Neurological:      General: No focal deficit present.      Mental Status: She is alert and oriented to person, place, and time.   Psychiatric:         Mood and Affect: Mood normal.         Behavior: Behavior normal.         Thought Content: Thought content normal.         Judgment: Judgment normal.        Laboratory:  CBC:  Lab Results   Component Value Date    WBC 7.13 01/12/2022    RBC 3.56 (L) 01/12/2022    HGB 11.5 (L) 01/12/2022    HCT 35.9 (L) 01/12/2022     01/12/2022     (H) 01/12/2022    MCH 32.3 (H) 01/12/2022    MCHC 32.0 01/12/2022    MCHC 33.2 09/18/2021    MCHC 33.4 06/04/2021     CMP:  Lab Results   Component Value Date    GLU 86 09/15/2022    CALCIUM 9.8 09/15/2022    ALBUMIN 4.2 09/15/2022    PROT 6.9 09/15/2022     09/15/2022    K 4.3 09/15/2022    CO2 23 09/15/2022     09/15/2022    BUN 28 (H) 09/15/2022    ALKPHOS 66 09/15/2022    ALT 18 09/15/2022    AST 24 09/15/2022    BILITOT 1.1 (H) 09/15/2022    BILITOT 0.8 08/15/2022    BILITOT 1.0 06/14/2022     URINALYSIS:  Lab Results   Component Value Date    COLORU Kelley 01/21/2020    SPECGRAV 1.030 01/21/2020    PHUR 5.0 01/21/2020    PROTEINUA 2+ (A)  01/21/2020    BACTERIA None 01/21/2020    NITRITE Negative 01/21/2020    LEUKOCYTESUR Negative 01/21/2020    UROBILINOGEN Negative 03/09/2018    HYALINECASTS 68 (A) 01/21/2020    HYALINECASTS 1 03/30/2016    HYALINECASTS 0 06/14/2015      LIPIDS:  Lab Results   Component Value Date    TSH 1.821 01/12/2022    TSH 1.209 08/05/2020    TSH 0.978 01/09/2020    HDL 53 09/15/2022    HDL 52 06/14/2022    HDL 54 01/12/2022    CHOL 174 09/15/2022    CHOL 173 06/14/2022    CHOL 167 01/12/2022    TRIG 174 (H) 09/15/2022    TRIG 150 06/14/2022    TRIG 141 01/12/2022    LDLCALC 86.2 09/15/2022    LDLCALC 91.0 06/14/2022    LDLCALC 84.8 01/12/2022    CHOLHDL 30.5 09/15/2022    CHOLHDL 30.1 06/14/2022    CHOLHDL 32.3 01/12/2022    NONHDLCHOL 121 09/15/2022    NONHDLCHOL 121 06/14/2022    NONHDLCHOL 113 01/12/2022    TOTALCHOLEST 3.3 09/15/2022    TOTALCHOLEST 3.3 06/14/2022    TOTALCHOLEST 3.1 01/12/2022     TSH:  Lab Results   Component Value Date    TSH 1.821 01/12/2022    TSH 1.209 08/05/2020    TSH 0.978 01/09/2020     A1C:  Lab Results   Component Value Date    HGBA1C 5.5 05/27/2011         Assessment/Plan     Gerda Lai is a 89 y.o.female with:    Anxiety    Stage 3b chronic kidney disease          Health Maintenance Due   Topic Date Due    COVID-19 Vaccine (4 - Booster for Pfizer series) 12/28/2021       She will follow up with Orthopedic for noting bilateral knee swelling   Stay hydrated with water   Mupirocin ointment to left axillary    I spent 35 minutes on the day of this encounter for preparing for, evaluating, treating, and managing this patient.      -Continue current medications and maintain follow up with specialists.  Return to clinic as needed for any concerns   No follow-ups on file.       Erin Jiminez, NP-C [facility]

## 2023-03-08 ENCOUNTER — PATIENT MESSAGE (OUTPATIENT)
Dept: DERMATOLOGY | Facility: CLINIC | Age: 88
End: 2023-03-08
Payer: MEDICARE

## 2023-03-08 ENCOUNTER — PATIENT MESSAGE (OUTPATIENT)
Dept: INTERNAL MEDICINE | Facility: CLINIC | Age: 88
End: 2023-03-08
Payer: MEDICARE

## 2023-03-08 RX ORDER — FELODIPINE 5 MG/1
5 TABLET, EXTENDED RELEASE ORAL DAILY
Qty: 90 TABLET | Refills: 3 | Status: ON HOLD | OUTPATIENT
Start: 2023-03-08 | End: 2023-06-05 | Stop reason: HOSPADM

## 2023-03-09 ENCOUNTER — OFFICE VISIT (OUTPATIENT)
Dept: OPTOMETRY | Facility: CLINIC | Age: 88
End: 2023-03-09
Payer: MEDICARE

## 2023-03-09 DIAGNOSIS — Z13.5 SCREENING FOR GLAUCOMA: ICD-10-CM

## 2023-03-09 DIAGNOSIS — H04.123 DRY EYES, BILATERAL: Primary | ICD-10-CM

## 2023-03-09 DIAGNOSIS — H52.4 PRESBYOPIA: ICD-10-CM

## 2023-03-09 PROCEDURE — 92014 PR EYE EXAM, EST PATIENT,COMPREHESV: ICD-10-PCS | Mod: S$PBB,,, | Performed by: OPTOMETRIST

## 2023-03-09 PROCEDURE — 99213 OFFICE O/P EST LOW 20 MIN: CPT | Mod: PBBFAC,PO | Performed by: OPTOMETRIST

## 2023-03-09 PROCEDURE — 99999 PR PBB SHADOW E&M-EST. PATIENT-LVL III: ICD-10-PCS | Mod: PBBFAC,,, | Performed by: OPTOMETRIST

## 2023-03-09 PROCEDURE — 92014 COMPRE OPH EXAM EST PT 1/>: CPT | Mod: S$PBB,,, | Performed by: OPTOMETRIST

## 2023-03-09 PROCEDURE — 92015 DETERMINE REFRACTIVE STATE: CPT | Mod: ,,, | Performed by: OPTOMETRIST

## 2023-03-09 PROCEDURE — 92015 PR REFRACTION: ICD-10-PCS | Mod: ,,, | Performed by: OPTOMETRIST

## 2023-03-09 PROCEDURE — 99999 PR PBB SHADOW E&M-EST. PATIENT-LVL III: CPT | Mod: PBBFAC,,, | Performed by: OPTOMETRIST

## 2023-03-09 NOTE — PROGRESS NOTES
"HPI     Concerns About Ocular Health            Comments: SOY: 1/12/2022          Comments    Presents today for routine eye exam. Pt reports noticeable vision changes.   Pt denies eye pain.     S/P PCIOLOU/YAG OS          Last edited by Tess Jimenez MA on 3/9/2023  2:59 PM.        ROS    Positive for: Eyes (cat surgery OU)  Negative for: Constitutional, Gastrointestinal, Neurological, Skin,   Genitourinary, Musculoskeletal, HENT, Endocrine, Cardiovascular,   Respiratory, Psychiatric, Allergic/Imm, Heme/Lymph  Last edited by Luis Daniel Linares, OD on 3/9/2023  3:06 PM.        Assessment /Plan     For exam results, see Encounter Report.    Dry eyes, bilateral    Screening for glaucoma    Presbyopia      1. Mild pco OD sp pciol ou/YAG OS--pt wishes new Rx  2. DAMION/rosacea hx. Pt to cont w ATs  3. A few times pt has looked in the mirror and it seemed her eye was "drifting out"--once on the right eye, and once on the left eye.  No diplopia.  Pt ortho today.  Since not bothersome will monitor    PLAN:    rtc 1 yr, or pt will rtc immediately if inc "drift" or diplopia!                   "

## 2023-03-10 ENCOUNTER — PATIENT MESSAGE (OUTPATIENT)
Dept: DERMATOLOGY | Facility: CLINIC | Age: 88
End: 2023-03-10
Payer: MEDICARE

## 2023-03-10 LAB
FINAL PATHOLOGIC DIAGNOSIS: NORMAL
GROSS: NORMAL
Lab: NORMAL
MICROSCOPIC EXAM: NORMAL

## 2023-03-12 ENCOUNTER — PATIENT MESSAGE (OUTPATIENT)
Dept: OPTOMETRY | Facility: CLINIC | Age: 88
End: 2023-03-12
Payer: MEDICARE

## 2023-03-13 NOTE — PROGRESS NOTES
Please call patient to schedule a Mohs consultation.     Skin, right lower forearm, shave biopsy:   -SQUAMOUS CELL CARCINOMA, EXTENDING TO THE DEEP BIOPSY EDGE   This lesion is skin cancer. You will be contacted regarding treatment.

## 2023-03-16 DIAGNOSIS — M17.11 PRIMARY OSTEOARTHRITIS OF RIGHT KNEE: Primary | ICD-10-CM

## 2023-03-22 ENCOUNTER — OFFICE VISIT (OUTPATIENT)
Dept: DERMATOLOGY | Facility: CLINIC | Age: 88
End: 2023-03-22
Payer: MEDICARE

## 2023-03-22 ENCOUNTER — LAB VISIT (OUTPATIENT)
Dept: LAB | Facility: HOSPITAL | Age: 88
End: 2023-03-22
Attending: ORTHOPAEDIC SURGERY
Payer: MEDICARE

## 2023-03-22 DIAGNOSIS — I10 ESSENTIAL HYPERTENSION: ICD-10-CM

## 2023-03-22 DIAGNOSIS — C44.622 SCC (SQUAMOUS CELL CARCINOMA), ARM, RIGHT: Primary | ICD-10-CM

## 2023-03-22 DIAGNOSIS — E78.5 HYPERLIPIDEMIA, UNSPECIFIED HYPERLIPIDEMIA TYPE: Chronic | ICD-10-CM

## 2023-03-22 LAB
ALBUMIN SERPL BCP-MCNC: 3.9 G/DL (ref 3.5–5.2)
ALP SERPL-CCNC: 71 U/L (ref 55–135)
ALT SERPL W/O P-5'-P-CCNC: 15 U/L (ref 10–44)
ANION GAP SERPL CALC-SCNC: 14 MMOL/L (ref 8–16)
AST SERPL-CCNC: 22 U/L (ref 10–40)
BILIRUB SERPL-MCNC: 0.7 MG/DL (ref 0.1–1)
BUN SERPL-MCNC: 26 MG/DL (ref 8–23)
CALCIUM SERPL-MCNC: 9.7 MG/DL (ref 8.7–10.5)
CHLORIDE SERPL-SCNC: 109 MMOL/L (ref 95–110)
CHOLEST SERPL-MCNC: 171 MG/DL (ref 120–199)
CHOLEST/HDLC SERPL: 3.2 {RATIO} (ref 2–5)
CO2 SERPL-SCNC: 19 MMOL/L (ref 23–29)
CREAT SERPL-MCNC: 0.9 MG/DL (ref 0.5–1.4)
EST. GFR  (NO RACE VARIABLE): >60 ML/MIN/1.73 M^2
GLUCOSE SERPL-MCNC: 85 MG/DL (ref 70–110)
HDLC SERPL-MCNC: 54 MG/DL (ref 40–75)
HDLC SERPL: 31.6 % (ref 20–50)
LDLC SERPL CALC-MCNC: 84.4 MG/DL (ref 63–159)
NONHDLC SERPL-MCNC: 117 MG/DL
POTASSIUM SERPL-SCNC: 3.9 MMOL/L (ref 3.5–5.1)
PROT SERPL-MCNC: 6.8 G/DL (ref 6–8.4)
SODIUM SERPL-SCNC: 142 MMOL/L (ref 136–145)
TRIGL SERPL-MCNC: 163 MG/DL (ref 30–150)

## 2023-03-22 PROCEDURE — 99024 PR POST-OP FOLLOW-UP VISIT: ICD-10-PCS | Mod: POP,,, | Performed by: DERMATOLOGY

## 2023-03-22 PROCEDURE — 99999 PR PBB SHADOW E&M-EST. PATIENT-LVL II: ICD-10-PCS | Mod: PBBFAC,,, | Performed by: DERMATOLOGY

## 2023-03-22 PROCEDURE — 99999 PR PBB SHADOW E&M-EST. PATIENT-LVL II: CPT | Mod: PBBFAC,,, | Performed by: DERMATOLOGY

## 2023-03-22 PROCEDURE — 36415 COLL VENOUS BLD VENIPUNCTURE: CPT | Mod: PO | Performed by: NURSE PRACTITIONER

## 2023-03-22 PROCEDURE — 99212 OFFICE O/P EST SF 10 MIN: CPT | Mod: PBBFAC,PO | Performed by: DERMATOLOGY

## 2023-03-22 PROCEDURE — 99024 POSTOP FOLLOW-UP VISIT: CPT | Mod: POP,,, | Performed by: DERMATOLOGY

## 2023-03-22 PROCEDURE — 80061 LIPID PANEL: CPT | Performed by: NURSE PRACTITIONER

## 2023-03-22 PROCEDURE — 80053 COMPREHEN METABOLIC PANEL: CPT | Performed by: NURSE PRACTITIONER

## 2023-03-22 NOTE — PROGRESS NOTES
Subjective:       Patient ID:  Gerda Lai is a 89 y.o. female who presents for   Chief Complaint   Patient presents with    Wound Check     R lower forearm      Pt here for wound check on right lower forearm. Pt did not take oral abx and use mupirocen only.   Pt feels site is improved     Final Pathologic Diagnosis       Date                     Value               Ref Range           Status                03/02/2023                                                       Final             Skin, right lower forearm, shave biopsy: -SQUAMOUS CELL CARCINOMA, EXTENDING TO THE DEEP BIOPSY EDGE This lesion is skin cancer. You will be contacted regarding treatment.     Comment:    Interp By Ike Ramos M.D., Signed on 03/10/2023 at 10:07  ----------          Pt has mohs scheduled for April with Dr Benoit  Site is well  healed and no current signs of infection.     F/u 3mo UBSE    Wound Check    Review of Systems     Objective:    Physical Exam       Diagram Legend     Erythematous scaling macule/papule c/w actinic keratosis       Vascular papule c/w angioma      Pigmented verrucoid papule/plaque c/w seborrheic keratosis      Yellow umbilicated papule c/w sebaceous hyperplasia      Irregularly shaped tan macule c/w lentigo     1-2 mm smooth white papules consistent with Milia      Movable subcutaneous cyst with punctum c/w epidermal inclusion cyst      Subcutaneous movable cyst c/w pilar cyst      Firm pink to brown papule c/w dermatofibroma      Pedunculated fleshy papule(s) c/w skin tag(s)      Evenly pigmented macule c/w junctional nevus     Mildly variegated pigmented, slightly irregular-bordered macule c/w mildly atypical nevus      Flesh colored to evenly pigmented papule c/w intradermal nevus       Pink pearly papule/plaque c/w basal cell carcinoma      Erythematous hyperkeratotic cursted plaque c/w SCC      Surgical scar with no sign of skin cancer recurrence      Open and closed comedones      Inflammatory  papules and pustules      Verrucoid papule consistent consistent with wart     Erythematous eczematous patches and plaques     Dystrophic onycholytic nail with subungual debris c/w onychomycosis     Umbilicated papule    Erythematous-base heme-crusted tan verrucoid plaque consistent with inflamed seborrheic keratosis     Erythematous Silvery Scaling Plaque c/w Psoriasis     See annotation      Assessment / Plan:        There are no diagnoses linked to this encounter.         No follow-ups on file.

## 2023-03-28 ENCOUNTER — OFFICE VISIT (OUTPATIENT)
Dept: PAIN MEDICINE | Facility: CLINIC | Age: 88
End: 2023-03-28
Payer: MEDICARE

## 2023-03-28 ENCOUNTER — OFFICE VISIT (OUTPATIENT)
Dept: CARDIOLOGY | Facility: CLINIC | Age: 88
End: 2023-03-28
Payer: MEDICARE

## 2023-03-28 VITALS
SYSTOLIC BLOOD PRESSURE: 133 MMHG | HEART RATE: 61 BPM | BODY MASS INDEX: 25.21 KG/M2 | DIASTOLIC BLOOD PRESSURE: 63 MMHG | HEIGHT: 62 IN | WEIGHT: 137 LBS

## 2023-03-28 VITALS — HEIGHT: 62 IN | WEIGHT: 136 LBS | RESPIRATION RATE: 18 BRPM | BODY MASS INDEX: 25.03 KG/M2

## 2023-03-28 DIAGNOSIS — M47.816 LUMBAR SPONDYLOSIS: ICD-10-CM

## 2023-03-28 DIAGNOSIS — E78.00 PURE HYPERCHOLESTEROLEMIA: ICD-10-CM

## 2023-03-28 DIAGNOSIS — M47.812 CERVICAL SPONDYLOSIS: Primary | ICD-10-CM

## 2023-03-28 DIAGNOSIS — M54.12 CERVICAL RADICULOPATHY: ICD-10-CM

## 2023-03-28 DIAGNOSIS — M48.02 CERVICAL STENOSIS OF SPINAL CANAL: ICD-10-CM

## 2023-03-28 DIAGNOSIS — M79.18 MYOFASCIAL PAIN: ICD-10-CM

## 2023-03-28 DIAGNOSIS — Z74.09 IMPAIRED FUNCTIONAL MOBILITY, BALANCE, GAIT, AND ENDURANCE: ICD-10-CM

## 2023-03-28 DIAGNOSIS — I65.23 BILATERAL CAROTID ARTERY STENOSIS: ICD-10-CM

## 2023-03-28 DIAGNOSIS — I10 HYPERTENSION, ESSENTIAL: Primary | Chronic | ICD-10-CM

## 2023-03-28 DIAGNOSIS — R00.1 SINUS BRADYCARDIA: ICD-10-CM

## 2023-03-28 PROCEDURE — 99215 OFFICE O/P EST HI 40 MIN: CPT | Mod: S$PBB,,, | Performed by: STUDENT IN AN ORGANIZED HEALTH CARE EDUCATION/TRAINING PROGRAM

## 2023-03-28 PROCEDURE — 99215 PR OFFICE/OUTPT VISIT, EST, LEVL V, 40-54 MIN: ICD-10-PCS | Mod: S$PBB,,, | Performed by: STUDENT IN AN ORGANIZED HEALTH CARE EDUCATION/TRAINING PROGRAM

## 2023-03-28 PROCEDURE — 99214 OFFICE O/P EST MOD 30 MIN: CPT | Mod: PBBFAC,27,PO | Performed by: INTERNAL MEDICINE

## 2023-03-28 PROCEDURE — 99213 OFFICE O/P EST LOW 20 MIN: CPT | Mod: PBBFAC,PN | Performed by: STUDENT IN AN ORGANIZED HEALTH CARE EDUCATION/TRAINING PROGRAM

## 2023-03-28 PROCEDURE — 99214 OFFICE O/P EST MOD 30 MIN: CPT | Mod: S$PBB,,, | Performed by: INTERNAL MEDICINE

## 2023-03-28 PROCEDURE — 99214 PR OFFICE/OUTPT VISIT, EST, LEVL IV, 30-39 MIN: ICD-10-PCS | Mod: S$PBB,,, | Performed by: INTERNAL MEDICINE

## 2023-03-28 PROCEDURE — 93010 EKG 12-LEAD: ICD-10-PCS | Mod: S$PBB,,, | Performed by: INTERNAL MEDICINE

## 2023-03-28 PROCEDURE — 93005 ELECTROCARDIOGRAM TRACING: CPT | Mod: PBBFAC,PO | Performed by: INTERNAL MEDICINE

## 2023-03-28 PROCEDURE — 99999 PR PBB SHADOW E&M-EST. PATIENT-LVL IV: ICD-10-PCS | Mod: PBBFAC,,, | Performed by: INTERNAL MEDICINE

## 2023-03-28 PROCEDURE — 99999 PR PBB SHADOW E&M-EST. PATIENT-LVL III: ICD-10-PCS | Mod: PBBFAC,,, | Performed by: STUDENT IN AN ORGANIZED HEALTH CARE EDUCATION/TRAINING PROGRAM

## 2023-03-28 PROCEDURE — 93010 ELECTROCARDIOGRAM REPORT: CPT | Mod: S$PBB,,, | Performed by: INTERNAL MEDICINE

## 2023-03-28 PROCEDURE — 99999 PR PBB SHADOW E&M-EST. PATIENT-LVL IV: CPT | Mod: PBBFAC,,, | Performed by: INTERNAL MEDICINE

## 2023-03-28 PROCEDURE — 99999 PR PBB SHADOW E&M-EST. PATIENT-LVL III: CPT | Mod: PBBFAC,,, | Performed by: STUDENT IN AN ORGANIZED HEALTH CARE EDUCATION/TRAINING PROGRAM

## 2023-03-28 RX ORDER — SPIRONOLACTONE 25 MG/1
25 TABLET ORAL
Qty: 30 TABLET | Refills: 11 | Status: SHIPPED | OUTPATIENT
Start: 2023-03-29 | End: 2023-09-11

## 2023-03-28 NOTE — PATIENT INSTRUCTIONS
Start spironolactone 25 mg 1 tablet 3 days a week - for high blood pressure  Add Zetia(ezetimibe) 10 mg 1 tablet a day - to lower your cholesterol.  Continue taking pravastatin an older other blood pressure meds.

## 2023-03-28 NOTE — PROGRESS NOTES
Subjective:     Problem List:  Hypertension  - intolerance to multiple antihypertensive meds  - wide auscultatory gap  Right subclavian artery stenosis  Bilateral Carotid artery disease, 70-79%  Breast cancer    HPI:   Gerda Lai is a 89 y.o. female who presents for follow-up of Hypertension and Hyperlipidemia    BPs continue to be labile. She had considerable swelling with 10 mg of felodipine. She does not take a diuretic. She used to take HCTZ in the past.  Labetalol works well.    She does not report focal weakness, numbness or sudden loss of vision suggestive of a TIA or stroke.    On pravastatin 80 mg LDL has been mostly in the 80s, range 76-91 mg/dl.    She will be undergoing Moh's surgery        Review of patient's allergies indicates:   Allergen Reactions    Sulfa (sulfonamide antibiotics) Rash    Clarithromycin Other (See Comments)     Weak, extreme fatigue. dizziness    Flexeril [cyclobenzaprine] Other (See Comments)     Dizziness    Iodine and iodide containing products     Lisinopril Other (See Comments)     Cough and sensation of throat swelling/?angioedema    Losartan Rash    Metoprolol Swelling     Tightness in throat    Spironolactone      Throat tightening    Tramadol Other (See Comments)     Dizzy and weak    Verapamil (bulk) Palpitations    Voltaren [diclofenac sodium] Other (See Comments)     Drops blood pressure        Current Outpatient Medications   Medication Sig    acetaminophen (TYLENOL) 650 MG TbSR Take 1 tablet (650 mg total) by mouth every 8 (eight) hours as needed.    aliskiren (TEKTURNA) 300 MG Tab TAKE 1 TABLET BY MOUTH EVERY DAY    aspirin (ECOTRIN) 81 MG EC tablet TAKE 1 TABLET BY MOUTH EVERY DAY    coenzyme Q10 100 mg capsule Take 100 mg by mouth once daily.    felodipine (PLENDIL) 5 MG 24 hr tablet Take 1 tablet (5 mg total) by mouth once daily.    glucosamine-chondroitin 500-400 mg tablet Take 1 tablet by mouth once daily.     labetaloL (NORMODYNE) 100 MG tablet Take 100  "mg by mouth 2 (two) times daily.    lactase (LACTAID) 3,000 unit tablet Take 1 tablet by mouth 3 (three) times daily as needed.    levoFLOXacin (LEVAQUIN) 500 MG tablet Take 1 tablet (500 mg total) by mouth once daily.    LORazepam (ATIVAN) 0.5 MG tablet TAKE 1/2 TABLET TO 1 TABLET BY MOUTH EVERY 12 HOURS AS NEEDED FOR ANXIETY    multivitamin capsule Take 1 capsule by mouth once daily.    omeprazole (PRILOSEC OTC) 20 MG tablet Take 20 mg by mouth once daily.    polymyxin B sulf-trimethoprim (POLYTRIM) 10,000 unit- 1 mg/mL Drop Place 1 drop into both eyes every 6 (six) hours.    pravastatin (PRAVACHOL) 80 MG tablet Take 1 tablet (80 mg total) by mouth once daily.    TURMERIC ORAL Take 500 mg by mouth once daily.     vitamin D (VITAMIN D3) 1000 units Tab Take 1,000 Units by mouth once daily.    albuterol (PROVENTIL/VENTOLIN HFA) 90 mcg/actuation inhaler INHALE 1 TO 2 PUFFS BY MOUTH EVERY 6 HOURS AS NEEDED FOR WHEEZE (Patient not taking: Reported on 3/28/2023)    mupirocin (BACTROBAN) 2 % ointment AAA bid (Patient not taking: Reported on 3/28/2023)       Social history:  Gerda Lai  reports that she has never smoked. She has never been exposed to tobacco smoke. She has never used smokeless tobacco. She reports current alcohol use. She reports that she does not use drugs.      Objective:   /63   Pulse 61   Ht 5' 2" (1.575 m)   Wt 62.1 kg (137 lb 0.3 oz)   BMI 25.06 kg/m²    Physical Exam  Constitutional:       Appearance: Normal appearance. She is well-developed.   Neck:      Vascular: Carotid bruit (bilaterally, left louder than right) present. No JVD.   Cardiovascular:      Rate and Rhythm: Normal rate and regular rhythm.      Pulses:           Radial pulses are 2+ on the right side and 2+ on the left side.        Dorsalis pedis pulses are 2+ on the right side and 2+ on the left side.      Heart sounds: No murmur heard.  Pulmonary:      Breath sounds: No decreased breath sounds, wheezing or rales. "   Chest:      Chest wall: There is no dullness to percussion.   Abdominal:      Palpations: Abdomen is soft. There is no hepatomegaly or splenomegaly.      Tenderness: There is no abdominal tenderness.   Musculoskeletal:      Right lower leg: No edema.      Left lower leg: No edema.   Skin:     General: Skin is warm.      Findings: No bruising.      Nails: There is no clubbing.   Neurological:      Mental Status: She is alert and oriented to person, place, and time.   Psychiatric:         Speech: Speech normal.         Behavior: Behavior normal.           Lab Results   Component Value Date    CHOL 171 03/22/2023    HDL 54 03/22/2023    LDLCALC 84.4 03/22/2023    TRIG 163 (H) 03/22/2023    CHOLHDL 31.6 03/22/2023     Lab Results   Component Value Date    GLU 85 03/22/2023    CREATININE 0.9 03/22/2023    BUN 26 (H) 03/22/2023     03/22/2023    K 3.9 03/22/2023     03/22/2023    CO2 19 (L) 03/22/2023     Lab Results   Component Value Date    ALT 15 03/22/2023    AST 22 03/22/2023    ALKPHOS 71 03/22/2023    BILITOT 0.7 03/22/2023           Assessment and Plan:       ICD-10-CM ICD-9-CM   1. Hypertension, essential  I10 401.9   2. Bilateral carotid artery stenosis  I65.23 433.10     433.30   3. Pure hypercholesterolemia  E78.00 272.0   4. Sinus bradycardia  R00.1 427.89        Labile blood pressures.  Intolerant to several meds.  Continue Tekturna, felodipine and labetalol.  Add spironolactone 3 days a week.  LDL in the 80s.  Continue pravastatin.  Add ezetimibe to lower LDL closer to 55.  She does not report focal weakness, numbness or sudden loss of vision suggestive of a TIA or stroke.  Repeat carotid ultrasound.    Orders placed during this encounter:     Hypertension, essential  -     IN OFFICE EKG 12-LEAD (to Muse)  -     spironolactone (ALDACTONE) 25 MG tablet; Take 1 tablet (25 mg total) by mouth 3 (three) times a week.  Dispense: 30 tablet; Refill: 11  -     Basic Metabolic Panel; Future; Expected  date: 04/28/2023  -     Comprehensive Metabolic Panel; Future; Expected date: 04/28/2024    Bilateral carotid artery stenosis  -     CV Ultrasound Bilateral Doppler Carotid; Future    Pure hypercholesterolemia  -     Lipid Panel; Future; Expected date: 04/28/2024    Sinus bradycardia         Follow up in about 10 months (around 1/28/2024).

## 2023-03-28 NOTE — PROGRESS NOTES
Chronic Pain - f/u    Referring Physician: No ref. provider found    Date: 03/28/2023     Re: Gerda Lai  MR#: [id number]  YOB: 1933  Age: 89 y.o.    Chief Complaint: neck pain  No chief complaint on file.    **This note is dictated using the M*Modal Fluency Direct word recognition program. There are word recognition mistakes that are occasionally missed on review.**    ASSESSMENT: 89 y.o. year old female with neck and arm pain, consistent with     1. Cervical spondylosis  Acupuncture      2. Myofascial pain  Acupuncture      3. Lumbar spondylosis  Acupuncture      4. Cervical radiculopathy        5. Cervical stenosis of spinal canal          PLAN:     Cervical spinal stenosis and radiculopathy  -Prior records reviewed. Previous patient of Dr. Blackmon, then Rebeca Gregorio and now to me.  -imaging discussed with patient. Moderate/severe CS and FS at multiple levels. Has autofusion at C3-4.  -s/p HEIDY on 2/23/23 w/ 80%@1m.      - RTC 4-5 months  - Counseled patient regarding the importance of weight loss and activity modification and physical therapy.    The above plan and management options were discussed at length with patient. Patient is in agreement with the above and verbalized understanding. It will be communicated with the referring physician via electronic record, fax, or mail.  Lab/study reports reviewed were important and necessary because subsequent medical and treatment recommendations required review of the above lab/study reports. Images viewed/reviewed above were important and necessary because subsequent medical and treatment recommendations required review of the reviewed image(s).     Electronically signed by:  Andi Cisneros DO  03/28/2023    =========================================================================================================    SUBJECTIVE:    Gerda Lai is a 89 y.o. female presents to the clinic for the evaluation of neck pain. The pain started 1  month ago following surgery on neck  and symptoms have been worsening. Patient was seeing Rebeca Gregorio and transferring care to me. She is s/p HEIDY at C7-T1 on 2/20/23 which has been helpful for the pain in the neck and arm.  She continues to have tingling but improvement in the pain.    She is having some other issues especially in the legs which she is being worked up with her PCP/cardiology. She has an appointment with cardiology to evaluate.    Pain Description:    The pain is located in the neck area and radiates to the upper head/ back of the head .    At BEST  1/10   At WORST  4/10 on the WORST day.    On average pain is rated as 1/10.   Today the pain is rated as 1/10  The pain is continuous.  The pain is described as tingling    Symptoms interfere with daily activity and sleeping.   Exacerbating factors: daily activity.    Mitigating factors nothing and rest.   She reports 8 hours of sleep per night.    Physical Therapy/Home Exercise: Yes, currently has a home exercise program. She exercises everyday in the morning.     Current Pain Medications:    - Tylenol arthritis TID-QID. ASA 81.    Failed Pain Medications:    - tylenol, tumeric    Pain Treatment Therapies:    Pain procedures:   03/2022 - left cervical C5,6,7 RFA  02/24/23 - HEIDY C7-T1 - 80% at 2 months    Physical Therapy: does home exercise programs  Chiropractor: none  Acupuncture: none  TENS unit: none  Spinal decompression: none  Joint replacement: hip and knee replacement    Patient denies urinary incontinence, bowel incontinence, and loss of sensations.  Patient denies any suicidal or homicidal ideations     report:  Reviewed and consistent with medication use as prescribed.    Imaging:   MRI Cervical 01/2023:  FINDINGS:  Straightening of the cervical lordosis.  Grade 1 retrolisthesis of C4-C5.  Grade 1 anterolisthesis of C6-C7, C7-T1 and T1-T2.  Degenerative endplate changes throughout the cervical spine.  No fracture or marrow infiltrative  process.  Severe disc height loss at C3 through C6.  Suspected fusion of the C3-C4 disc space.  Osseous fusion of the left C2-C3 and C3-C4 facet joints.     Pre dens space is maintained.  Dens is intact.  There is thickening of the transverse ligament with moderate narrowing of the spinal canal between the transverse ligament and posterior arch of C1.     Cervical spinal cord demonstrates normal signal intensity.  Cerebellar tonsils are in their expected location.  Partially visualized posterior fossa is unremarkable.     Vertebral artery flow voids are present.  Paraspinal musculature demonstrates normal bulk and signal intensity.  Note made of cystic right thyroid nodule.     C2-C3: Uncovertebral spurring and severe facet arthropathy result in moderate left neural foraminal narrowing.     C3-C4: Posterior disc osteophyte complex with uncovertebral spurring and severe facet arthropathy result in severe left, moderate right neural foraminal narrowing.     C4-C5: Posterior disc osteophyte complex with uncovertebral spurring and severe facet arthropathy result in moderate spinal canal stenosis and severe bilateral neural foraminal narrowing.     C5-C6: Posterior disc osteophyte complex with uncovertebral spurring and severe facet arthropathy result in mild spinal canal stenosis and severe right neural foraminal narrowing.     C6-C7: Posterior disc osteophyte complex and moderate facet arthropathy noted.  No spinal canal stenosis or neural foraminal narrowing.     C7-T1: Moderate facet arthropathy noted.  No spinal canal stenosis or neural foraminal narrowing.     Impression:     1. Multilevel degenerative changes of the cervical spine detailed above.  Moderate spinal canal stenosis noted at the level of C1 and C4-C5.  Moderate to severe neural foraminal narrowing noted at C2-C6.  2. Fusion across the C3-C4 disc space and left-sided C2-C4 facet joints.    Past Medical History:   Diagnosis Date    Anxiety     Arthritis      Basal cell carcinoma 09/2016    right post auricular neck     Breast cancer 1992    right    Cataract     Fibromyalgia     History of measles as a child     Hyperlipidemia     Hypertension     Personal history of colonic polyps     Pneumonia     SCC (squamous cell carcinoma) 2015    R chest    SCC (squamous cell carcinoma) 2016    left medial shoulder    SCC (squamous cell carcinoma) 2017    left knee    SCC (squamous cell carcinoma) 2022    L upper chest, R upper arm KA types    Shingles     Squamous cell carcinoma 2015    right forearm    Thyroid disease     Vaginitis      Past Surgical History:   Procedure Laterality Date    ADENOIDECTOMY      BREAST BIOPSY Left     Excisional bx, benign    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    CARPAL TUNNEL RELEASE Right 3/16/2021    Procedure: RELEASE, CARPAL TUNNEL;  Surgeon: Barbara Aldridge MD;  Location: [facility] OR;  Service: Orthopedics;  Laterality: Right;    CATARACT EXTRACTION W/  INTRAOCULAR LENS IMPLANT Bilateral     EPIDURAL STEROID INJECTION N/A 2/24/2023    Procedure: HEIDI C7-T1;  Surgeon: Andi Cisneros DO;  Location: [facility] OR;  Service: Pain Management;  Laterality: N/A;    EYE SURGERY      HAND SURGERY      INJECTION OF ANESTHETIC AGENT AROUND MEDIAL BRANCH NERVES INNERVATING CERVICAL FACET JOINT N/A 1/4/2022    Procedure: Cervical Medial Branch Block C5-6, C6-7 (No Sedation);  Surgeon: Himanshu Blackmon Jr., MD;  Location: [facility]IN [facility]GT;  Service: Pain Management;  Laterality: N/A;    INJECTION OF ANESTHETIC AGENT AROUND MEDIAL BRANCH NERVES INNERVATING LUMBAR FACET JOINT Left 11/18/2021    Procedure: Cervical Medial Branch Block Left C5-6, C6-7 (IV Sedation);  Surgeon: Himanshu Blackmon Jr., MD;  Location: [facility];  Service: Pain Management;  Laterality: Left;    JOINT REPLACEMENT Right     ELZBIETA    KNEE ARTHROPLASTY Left 8/10/2020    Procedure: ARTHROPLASTY, KNEE-ADE NEXGEN/CEMENTED TIBIA;  Surgeon: Kalin Pacheco MD;  Location: [facility] OR;   Service: Orthopedics;  Laterality: Left;    RADIOFREQUENCY THERMAL COAGULATION OF MEDIAL BRANCH OF POSTERIOR RAMUS OF CERVICAL SPINAL NERVE Left 3/8/2022    Procedure: RADIOFREQUENCY THERMAL COAGULATION, NERVE, SPINAL, CERVICAL, LEFT C5-6 AND C6-7;  Surgeon: Himanshu Blackmon Jr., MD;  Location: [facility];  Service: Pain Management;  Laterality: Left;  NO PACEMAKER   ASA    thyriodectomy      partial    tonsillectomy      TONSILLECTOMY      TOTAL HIP ARTHROPLASTY      right    Yag  Left      Social History     Socioeconomic History    Marital status: Single   Occupational History     Employer: RETIRED   Tobacco Use    Smoking status: Never     Passive exposure: Never    Smokeless tobacco: Never   Substance and Sexual Activity    Alcohol use: Yes     Comment: socially - celebrations only    Drug use: Never    Sexual activity: Not Currently     Social Determinants of Health     Financial Resource Strain: Low Risk     Difficulty of Paying Living Expenses: Not hard at all   Food Insecurity: No Food Insecurity    Worried About Running Out of Food in the Last Year: Never true    Ran Out of Food in the Last Year: Never true   Transportation Needs: No Transportation Needs    Lack of Transportation (Medical): No    Lack of Transportation (Non-Medical): No   Stress: Stress Concern Present    Feeling of Stress : To some extent   Housing Stability: Unknown    Unable to Pay for Housing in the Last Year: No    Unstable Housing in the Last Year: No     Family History   Problem Relation Age of Onset    Breast cancer Mother     Cancer Mother     Stroke Paternal Grandfather     Heart disease Father     Cancer Paternal Aunt     Heart disease Paternal Aunt     Glaucoma Paternal Grandmother     Amblyopia Neg Hx     Blindness Neg Hx     Cataracts Neg Hx     Diabetes Neg Hx     Hypertension Neg Hx     Macular degeneration Neg Hx     Retinal detachment Neg Hx     Strabismus Neg Hx     Thyroid disease Neg Hx     Melanoma Neg Hx         Review of patient's allergies indicates:   Allergen Reactions    Sulfa (sulfonamide antibiotics) Rash    Clarithromycin Other (See Comments)     Weak, extreme fatigue. dizziness    Flexeril [cyclobenzaprine] Other (See Comments)     Dizziness    Iodine and iodide containing products     Lisinopril Other (See Comments)     Cough and sensation of throat swelling/?angioedema    Losartan Rash    Metoprolol Swelling     Tightness in throat    Tramadol Other (See Comments)     Dizzy and weak    Verapamil (bulk) Palpitations    Voltaren [diclofenac sodium] Other (See Comments)     Drops blood pressure       Current Outpatient Medications   Medication Sig    acetaminophen (TYLENOL) 650 MG TbSR Take 1 tablet (650 mg total) by mouth every 8 (eight) hours as needed.    albuterol (PROVENTIL/VENTOLIN HFA) 90 mcg/actuation inhaler INHALE 1 TO 2 PUFFS BY MOUTH EVERY 6 HOURS AS NEEDED FOR WHEEZE    aliskiren (TEKTURNA) 300 MG Tab TAKE 1 TABLET BY MOUTH EVERY DAY    aspirin (ECOTRIN) 81 MG EC tablet TAKE 1 TABLET BY MOUTH EVERY DAY    cephALEXin (KEFLEX) 500 MG capsule Sig 1 po tid x 7 days    coenzyme Q10 100 mg capsule Take 100 mg by mouth once daily.    felodipine (PLENDIL) 5 MG 24 hr tablet Take 1 tablet (5 mg total) by mouth once daily.    glucosamine-chondroitin 500-400 mg tablet Take 1 tablet by mouth once daily.     labetaloL (NORMODYNE) 100 MG tablet Take 100 mg by mouth 2 (two) times daily.    lactase (LACTAID) 3,000 unit tablet Take 1 tablet by mouth 3 (three) times daily as needed.    levoFLOXacin (LEVAQUIN) 500 MG tablet Take 1 tablet (500 mg total) by mouth once daily.    LORazepam (ATIVAN) 0.5 MG tablet TAKE 1/2 TABLET TO 1 TABLET BY MOUTH EVERY 12 HOURS AS NEEDED FOR ANXIETY    multivitamin capsule Take 1 capsule by mouth once daily.    mupirocin (BACTROBAN) 2 % ointment AAA bid    omeprazole (PRILOSEC OTC) 20 MG tablet Take 20 mg by mouth once daily.    polymyxin B sulf-trimethoprim (POLYTRIM) 10,000 unit- 1  "mg/mL Drop Place 1 drop into both eyes every 6 (six) hours.    pravastatin (PRAVACHOL) 80 MG tablet Take 1 tablet (80 mg total) by mouth once daily.    TURMERIC ORAL Take 500 mg by mouth once daily.     vitamin D (VITAMIN D3) 1000 units Tab Take 1,000 Units by mouth once daily.     No current facility-administered medications for this visit.     Facility-Administered Medications Ordered in Other Visits   Medication    fentaNYL 50 mcg/mL injection 25 mcg    midazolam (VERSED) 1 mg/mL injection 2 mg       REVIEW OF SYSTEMS:    GENERAL:  No weight loss, malaise or fevers.  HEENT:   No recent changes in vision or hearing  NECK:  Negative for lumps, no difficulty with swallowing.  RESPIRATORY:  Negative for cough, wheezing or shortness of breath, patient denies any recent URI.  CARDIOVASCULAR:  Negative for chest pain, leg swelling or palpitations. + leg swelling   GI:  Negative for abdominal discomfort, blood in stools or black stools or change in bowel habits.  MUSCULOSKELETAL:  See HPI.  SKIN:  Negative for lesions, rash, and itching. + ALL  PSYCH:  No mood disorder or recent psychosocial stressors.  Patients sleep is not disturbed secondary to pain.  HEMATOLOGY/LYMPHOLOGY:  Negative for prolonged bleeding, bruising easily or swollen nodes.  Patient is not currently taking any anti-coagulants  NEURO:   No history of headaches, syncope, paralysis, seizures or tremors.  All other reviewed and negative other than HPI.    OBJECTIVE:    BP (P) 121/70   Resp 18   Ht 5' 2" (1.575 m)   Wt 61.7 kg (136 lb)   BMI 24.87 kg/m²     PHYSICAL EXAMINATION:    GENERAL: Well appearing, in no acute distress, alert and oriented x3.  PSYCH:  Mood and affect appropriate.  SKIN: Skin color, texture, turgor normal, no rashes or lesions.  HEAD/FACE:  Normocephalic, atraumatic. Cranial nerves grossly intact.    NECK:   - Positive pain to palpation over the cervical paraspinous muscles.   - Spurling  Negative.  - Positive pain with neck " flexion, extension, or lateral flexion.     CV: RRR with palpation of the radial artery.  PULM: CTAB. No evidence of respiratory difficulty, symmetric chest rise.  GI:  Soft and non-tender.    MUSKULOSKELETAL:    Mild edema in both legs    Difficulty with right shoudler abduction and flextion. Cannot raise     NEURO: Bilateral upper and lower extremity coordination and muscle stretch reflexes are physiologic and symmetric.      NEUROLOGICAL EXAM:  MENTAL STATUS: A x O x 3, good concentration, speech is fluent and goal directed  MEMORY: recent and remote are intact  CN: CN2-12 grossly intact  MOTOR: 5/5 in all muscle groups. Right shoulder abduction 2/5, shoulder flexion 1/5.   DTRs: 2+ intact symmetric  Sensation:    -no Loss of sensation in a left upper and right upper C-4, C-5, C-6, C-7, and C-8 bilaterally distribution.  Hicks: absent on the bilateral side(s)  Clonus: absent on the bilateral side(s)    GAIT: normal.     Attending Only

## 2023-03-30 DIAGNOSIS — M54.50 CHRONIC BILATERAL LOW BACK PAIN WITHOUT SCIATICA: Primary | ICD-10-CM

## 2023-03-30 DIAGNOSIS — G89.29 CHRONIC BILATERAL LOW BACK PAIN WITHOUT SCIATICA: Primary | ICD-10-CM

## 2023-03-31 ENCOUNTER — PATIENT MESSAGE (OUTPATIENT)
Dept: INTERNAL MEDICINE | Facility: CLINIC | Age: 88
End: 2023-03-31
Payer: MEDICARE

## 2023-03-31 DIAGNOSIS — I10 HYPERTENSION, ESSENTIAL: Primary | Chronic | ICD-10-CM

## 2023-03-31 DIAGNOSIS — Z12.31 SCREENING MAMMOGRAM FOR BREAST CANCER: ICD-10-CM

## 2023-04-03 ENCOUNTER — TELEPHONE (OUTPATIENT)
Dept: PAIN MEDICINE | Facility: CLINIC | Age: 88
End: 2023-04-03
Payer: MEDICARE

## 2023-04-03 DIAGNOSIS — G89.29 CHRONIC BILATERAL LOW BACK PAIN WITHOUT SCIATICA: Primary | ICD-10-CM

## 2023-04-03 DIAGNOSIS — M54.50 CHRONIC BILATERAL LOW BACK PAIN WITHOUT SCIATICA: Primary | ICD-10-CM

## 2023-04-03 NOTE — TELEPHONE ENCOUNTER
"----- Message from Letha Carbajal sent at 4/3/2023 10:14 AM CDT -----  Regarding: Acupuncture Referral  Good morning Dr. Cisneros,    I am reaching out on behalf of Ms. Lai. Her insurance denied covering acupuncture because of the diagnosis submitted on the referral you originally sent in. Can we request a new referral with only the diagnosis "chronic low pain" so that the insurance can authorize her visits?    Thanks  Letha"

## 2023-04-05 DIAGNOSIS — M17.11 PRIMARY OSTEOARTHRITIS OF RIGHT KNEE: Primary | ICD-10-CM

## 2023-04-10 ENCOUNTER — HOSPITAL ENCOUNTER (OUTPATIENT)
Dept: CARDIOLOGY | Facility: HOSPITAL | Age: 88
Discharge: HOME | End: 2023-04-10
Attending: INTERNAL MEDICINE
Payer: MEDICARE

## 2023-04-10 DIAGNOSIS — I65.23 BILATERAL CAROTID ARTERY STENOSIS: ICD-10-CM

## 2023-04-10 LAB
LEFT ARM DIASTOLIC BLOOD PRESSURE: 80 MMHG
LEFT ARM SYSTOLIC BLOOD PRESSURE: 140 MMHG
LEFT CBA DIAS: 15 CM/S
LEFT CBA SYS: 163 CM/S
LEFT CCA DIST DIAS: 8 CM/S
LEFT CCA DIST SYS: 105 CM/S
LEFT CCA MID DIAS: 12 CM/S
LEFT CCA MID SYS: 100 CM/S
LEFT CCA PROX DIAS: 7 CM/S
LEFT CCA PROX SYS: 92 CM/S
LEFT ECA DIAS: 8 CM/S
LEFT ECA SYS: 219 CM/S
LEFT ICA DIST DIAS: 19 CM/S
LEFT ICA DIST SYS: 158 CM/S
LEFT ICA MID DIAS: 26 CM/S
LEFT ICA MID SYS: 217 CM/S
LEFT ICA PROX DIAS: 29 CM/S
LEFT ICA PROX SYS: 328 CM/S
LEFT VERTEBRAL DIAS: 7 CM/S
LEFT VERTEBRAL SYS: 42 CM/S
OHS CV CAROTID RIGHT ICA EDV HIGHEST: 32
OHS CV CAROTID ULTRASOUND LEFT ICA/CCA RATIO: 3.12
OHS CV CAROTID ULTRASOUND RIGHT ICA/CCA RATIO: 3.35
OHS CV PV CAROTID LEFT HIGHEST CCA: 105
OHS CV PV CAROTID LEFT HIGHEST ICA: 328
OHS CV PV CAROTID RIGHT HIGHEST CCA: 102
OHS CV PV CAROTID RIGHT HIGHEST ICA: 338
OHS CV US CAROTID LEFT HIGHEST EDV: 29
RIGHT CBA DIAS: 28 CM/S
RIGHT CBA SYS: 312 CM/S
RIGHT CCA DIST DIAS: 9 CM/S
RIGHT CCA DIST SYS: 101 CM/S
RIGHT CCA MID DIAS: 6 CM/S
RIGHT CCA MID SYS: 89 CM/S
RIGHT CCA PROX DIAS: 14 CM/S
RIGHT CCA PROX SYS: 102 CM/S
RIGHT ECA DIAS: 27 CM/S
RIGHT ECA SYS: 397 CM/S
RIGHT ICA DIST DIAS: 22 CM/S
RIGHT ICA DIST SYS: 158 CM/S
RIGHT ICA MID DIAS: 32 CM/S
RIGHT ICA MID SYS: 287 CM/S
RIGHT ICA PROX DIAS: 25 CM/S
RIGHT ICA PROX SYS: 338 CM/S
RIGHT VERTEBRAL DIAS: 12 CM/S
RIGHT VERTEBRAL SYS: 117 CM/S

## 2023-04-10 PROCEDURE — 93880 EXTRACRANIAL BILAT STUDY: CPT | Mod: 26,,, | Performed by: INTERNAL MEDICINE

## 2023-04-10 PROCEDURE — 93880 CV US DOPPLER CAROTID (CUPID ONLY): ICD-10-PCS | Mod: 26,,, | Performed by: INTERNAL MEDICINE

## 2023-04-10 PROCEDURE — 93880 EXTRACRANIAL BILAT STUDY: CPT

## 2023-04-17 ENCOUNTER — PATIENT MESSAGE (OUTPATIENT)
Dept: CARDIOLOGY | Facility: CLINIC | Age: 88
End: 2023-04-17
Payer: MEDICARE

## 2023-04-19 ENCOUNTER — PROCEDURE VISIT (OUTPATIENT)
Dept: DERMATOLOGY | Facility: CLINIC | Age: 88
End: 2023-04-19
Payer: MEDICARE

## 2023-04-19 VITALS
HEART RATE: 63 BPM | HEIGHT: 62 IN | WEIGHT: 137 LBS | DIASTOLIC BLOOD PRESSURE: 67 MMHG | BODY MASS INDEX: 25.21 KG/M2 | SYSTOLIC BLOOD PRESSURE: 132 MMHG

## 2023-04-19 DIAGNOSIS — C44.622 SQUAMOUS CELL CARCINOMA OF RIGHT UPPER EXTREMITY: Primary | ICD-10-CM

## 2023-04-19 PROCEDURE — 13121 CMPLX RPR S/A/L 2.6-7.5 CM: CPT | Mod: S$PBB,51,, | Performed by: DERMATOLOGY

## 2023-04-19 PROCEDURE — 99499 UNLISTED E&M SERVICE: CPT | Mod: S$PBB,,, | Performed by: DERMATOLOGY

## 2023-04-19 PROCEDURE — 13121 PR RECMPL WND SCALP,EXTR 2.6-7.5 CM: ICD-10-PCS | Mod: S$PBB,51,, | Performed by: DERMATOLOGY

## 2023-04-19 PROCEDURE — 13121 CMPLX RPR S/A/L 2.6-7.5 CM: CPT | Mod: PBBFAC | Performed by: DERMATOLOGY

## 2023-04-19 PROCEDURE — 17313 MOHS 1 STAGE T/A/L: CPT | Mod: S$PBB,,, | Performed by: DERMATOLOGY

## 2023-04-19 PROCEDURE — 17313 MOHS 1 STAGE T/A/L: CPT | Mod: PBBFAC | Performed by: DERMATOLOGY

## 2023-04-19 PROCEDURE — 17313: ICD-10-PCS | Mod: S$PBB,,, | Performed by: DERMATOLOGY

## 2023-04-19 PROCEDURE — 99499 NO LOS: ICD-10-PCS | Mod: S$PBB,,, | Performed by: DERMATOLOGY

## 2023-04-19 NOTE — PROGRESS NOTES
PROCEDURE: Mohs' Micrographic Surgery    INDICATION: Tumors with aggressive clinical behavior (rapidly growing, greater than 1 cm in diameter). Tumor with ill-defined borders.    REFERRING PROVIDER: Bouchra Curtis M.D.    CASE NUMBER:     ANESTHETIC: 3 cc 0.5% Bupivacaine with Epi 1:200,000 mixed 1:1 with plain 1% Lidocaine    SURGICAL PREP: Hibiclens    SURGEON: Chantale Benoit MD    ASSISTANTS:  Adela Restrepo MA    PREOPERATIVE DIAGNOSIS: squamous cell carcinoma    POSTOPERATIVE DIAGNOSIS: squamous cell carcinoma- well to moderately differentiated    PATHOLOGIC DIAGNOSIS: squamous cell carcinoma- well to moderately differentiated    HISTOLOGY OF SPECIMENS IN FIRST STAGE:   Debulking tumor confirms well to moderately differentiated squamous cell carcinoma.  Tumor Type:  No tumor seen.    STAGES OF MOHS' SURGERY PERFORMED: 1    TUMOR-FREE PLANE ACHIEVED: Yes    HEMOSTASIS: electrocoagulation     SPECIMENS: 2     LOCATION: right lower forearm. Location verified with Dr. Curtis's clinical photograph. Patient also verified location.    INITIAL LESION SIZE: 1.0 x 1.0 cm    FINAL DEFECT SIZE: 1.8 x 2.0 cm    WOUND REPAIR/DISPOSITION: The patient tolerated Mohs' Micrographic Surgery for a squamous cell carcinoma very well. When the tumor was completely removed, a repair of the surgical defect was undertaken.    PROCEDURE: Complex Linear Repair    INDICATION: Status post Mohs' Micrographic Surgery for squamous cell carcinoma.    CASE NUMBER:     SURGEON: Chantale Benoit MD    ASSISTANTS: Nahed Siegel PA-C and Ángela Hoffman Surg Catrina    ANESTHETIC: 2.5 cc 1% Lidocaine with Epinephrine 1:100,000    SURGICAL PREP: Hibiclens, prepped by Ángela Hoffman Surg Tech    LOCATION: right lower forearm    DEFECT SIZE: 1.8 x 2.0 cm    WOUND REPAIR/DISPOSITION:  After the patient's carcinoma had been completely removed with Mohs' Micrographic Surgery, a repair of the surgical defect was undertaken. The patient was returned to  "the operating suite where the area of right lower forearm was prepped, draped, and anesthetized in the usual sterile fashion. The wound was widely undermined in all directions. The wound was undermined to a distance at least the maximum width of the defect as measured perpendicular to the closure line along at least one entire edge of the defect, in this case 2 cm. Then, electrocoagulation was used to obtain meticulous hemostasis. 3-0 Vicryl buried vertical mattress sutures were placed into the subcutaneous and dermal plane to close the wound and marisel the cutaneous wound edge. Bilateral dog ears were identified and were removed by a standard Burow's triangle technique. The cutaneous wound edges were closed using interrupted 4-0 Prolene suture.    The patient tolerated the procedure well.    The area was cleaned and dressed appropriately and the patient was given wound care instructions, as well as appointment for follow-up evaluation and suture removal in 14 days.    LENGTH OF REPAIR: 3.5 cm    Vitals:    04/19/23 1109 04/19/23 1308   BP: 113/64 132/67   BP Location: Left arm Left arm   Patient Position: Sitting Lying   BP Method: Medium (Automatic) Small (Automatic)   Pulse: 68 63   Weight: 62.1 kg (137 lb)    Height: 5' 2" (1.575 m)          "

## 2023-04-20 ENCOUNTER — PATIENT MESSAGE (OUTPATIENT)
Dept: OPTOMETRY | Facility: CLINIC | Age: 88
End: 2023-04-20
Payer: MEDICARE

## 2023-04-21 ENCOUNTER — CLINICAL SUPPORT (OUTPATIENT)
Dept: REHABILITATION | Facility: HOSPITAL | Age: 88
End: 2023-04-21
Attending: STUDENT IN AN ORGANIZED HEALTH CARE EDUCATION/TRAINING PROGRAM
Payer: MEDICARE

## 2023-04-21 DIAGNOSIS — M54.50 CHRONIC BILATERAL LOW BACK PAIN WITHOUT SCIATICA: ICD-10-CM

## 2023-04-21 DIAGNOSIS — G89.29 CHRONIC BILATERAL LOW BACK PAIN WITHOUT SCIATICA: ICD-10-CM

## 2023-04-21 PROCEDURE — 97811 ACUP 1/> W/O ESTIM EA ADD 15: CPT

## 2023-04-21 PROCEDURE — 97810 ACUP 1/> WO ESTIM 1ST 15 MIN: CPT

## 2023-04-21 NOTE — PROGRESS NOTES
"  Acupuncture Evaluation Note     Name: Gerda Lai  Clinic Number: [id number]    Traditional Chinese Medicine (TCM) Diagnosis: Qi Stagnation and Blood Stasis  Medical Diagnosis:   Encounter Diagnosis   Name Primary?    Chronic bilateral low back pain without sciatica         Evaluation Date: 4/21/2023    Visit #/Visits authorized: 1/12     Precautions: Standard    Subjective     Chief Concern: Chronic bilateral low back pain.  Patient also complains about neck pain that is local and tight.    Medical necessity is demonstrated by the following IMPAIRMENTS:   Medical Necessity: Decreased mobility limits day to day activities, social, and emergent situations and Decreased quality of life              Aggravating Factors:  movement   Relieving Factors:  medication    Symptom Description:     Quality:  Tight  Severity:  5  Frequency:  continuously    Previous Treatments Tried:  Medication and Therapy     Digestion:     Diet: in general, a "healthy" diet     Fluids: coffee 1 /day, is not drinking much,  Seldom drinks alcohol  Taste/Appetite: Normal    Sleep: A few hours every night    Energy Levels:  okay    Objective     Observation: In wheelchair, talks clearly, skin color on the neck, both shoulders, and low back appears to be normal with no scars.    Treatment     Treatment Principles:  To invigorate qi and blood.    Acupuncture points used:    Bilateral points:  DU12, DU13, DU14, UB11, UB12, UB13, GB20, Kadie Points x 6    Needles In: 23  Needles Out: 23  Needles W/O STIM placed: 1:20 PM  Needles W/O STIM removed: 1:40 PM    Assessment     After treatment, patient felt better.     Patient prognosis is Good.     Patient will continue to benefit from acupuncture treatment to address the deficits listed in the problem list box on initial evaluation, provide patient family education and to maximize pt's level of independence in the home and community environment.     Patient's spiritual, cultural and educational needs " considered and pt agreeable to plan of care and goals.     Anticipated barriers to treatment: None.    Plan     Recommend 1 /week for 11 sessions then re-assess.      Education:  Patient is aware of cumulative benefit of acupuncture

## 2023-04-24 NOTE — ASSESSMENT & PLAN NOTE
Reports occasional back pain  No loss of bladder or bowel control    Denies N/T to buttocks, perineum and inner surfaces of the thighs (saddle anesthesia)        Recent imaging: Xray L-Spine 8/17/21  FINDINGS:  Five lumbar vertebral bodies.  Vertebral body heights are maintained.  Disc space narrowing and degenerative endplate changes L1-2, L2-3, L3-4, and L5-S1.  Moderate facet arthropathy L3-4 through L5-S1.     Grade 1 retrolisthesis L1 on L2 and L2 on L3.  Grade 1 anterolisthesis L4 on L5.     Impression:     Moderate degenerative change as given in detail above.

## 2023-04-24 NOTE — ASSESSMENT & PLAN NOTE
Encouraged weight loss, healthy diet (DASH/Mediterranean) and exercise.   Patient should exercise 30 minutes at least five times weekly.Treated with: pravastatin

## 2023-04-24 NOTE — ASSESSMENT & PLAN NOTE
Treated with:  lorazepam every 12 hrs prn  ; controlled symptoms.   Denies suicidal/ homicidal ideations.

## 2023-04-24 NOTE — ASSESSMENT & PLAN NOTE
No changes in urine volume.   Most recent GFR: > 60   BUN=  26   Cr =  0.9; stable.  Encouraged to avoid NSAIDs, reduce salt intake, maintain adequate hydration, and exercise.    Tylenol as needed for pain    I  suggest monitoring renal function, input and output status janel-operatively. I  suggest avoiding nephrotoxic medication including NSAIDs, COX2 inhibitors, intravenous contrast agent,avoiding hypotension to prevent further renal impairment.

## 2023-04-24 NOTE — ASSESSMENT & PLAN NOTE
On ASA/pravastatin; followed per Cardiology    Carotid US results: 4/10/23  There is greater than 70% right Internal Carotid Stenosis.  There is greater than 70% left Internal Carotid Stenosis.  There is antegrade flow in the vertebral arteries bilaterally.  There is greater than 50% external carotid artery stenosis bilaterally.

## 2023-04-24 NOTE — ASSESSMENT & PLAN NOTE
Taking: aliskiren/felodipine/labetalol/spironolactone  Keeping a healthy weight/BMI can help with better BP control    Lifestyle changes to reduce systolic BP:  exercise 30 minutes per day,  5 days per week or 150 minutes weekly; sodium reduction and avoidance of high salt foods such as processed meats, frozen meals and  fast foods.     BP acceptable for surgery. I recommend monitoring BP during perioperative period as uncontrolled pain can elevate blood pressure.

## 2023-04-24 NOTE — ASSESSMENT & PLAN NOTE
Difficulty lifting right arm- attributes to history of torn rotator cuff  Has N/T bilateral hand  Followed by: pain management - last seen 3/28/23  S/P HEIDY at C7-T1 on 2/20/23   S/P Acupuncture 4/21/23  Not followed per [facility]      Imaging: Xray C-Spine 12/5/22  FINDINGS:  No acute fractures.  Degenerative cervical spondylosis changes seen and described on earlier exam without significant change.  Reconfirmed reversal of normal cervical lordosis.  Unremarkable predental space.  No widening prevertebral soft tissues.  Reconfirmed extensive degenerative osteophyte formation anteriorly from C3 through C7.  Stable grade 1 retrolisthesis of C4 with respect to C5 and mild anterolisthesis C6 with respect to C7.  The flexion and extension views demonstrate no instability.  Stable disc narrowing C3-C4, C4-C5, C5-C6 levels.  Stable multilevel facet arthropathic changes and areas of ligamentum nuchae calcification.  Intact odontoid tip.  Some degenerative changes about the C1-C2 articulation.  As before, posterior elements at C2-C3 appear fuse.     Impression:     As above.

## 2023-04-25 ENCOUNTER — PATIENT MESSAGE (OUTPATIENT)
Dept: ADMINISTRATIVE | Facility: OTHER | Age: 88
End: 2023-04-25
Payer: MEDICARE

## 2023-04-25 ENCOUNTER — TELEPHONE (OUTPATIENT)
Dept: PREADMISSION TESTING | Facility: HOSPITAL | Age: 88
End: 2023-04-25
Payer: MEDICARE

## 2023-04-25 ENCOUNTER — OFFICE VISIT (OUTPATIENT)
Dept: INTERNAL MEDICINE | Facility: CLINIC | Age: 88
End: 2023-04-25
Payer: MEDICARE

## 2023-04-25 ENCOUNTER — OFFICE VISIT (OUTPATIENT)
Dept: ORTHOPEDICS | Facility: CLINIC | Age: 88
End: 2023-04-25
Payer: MEDICARE

## 2023-04-25 ENCOUNTER — HOSPITAL ENCOUNTER (OUTPATIENT)
Dept: RADIOLOGY | Facility: HOSPITAL | Age: 88
Discharge: HOME | End: 2023-04-25
Attending: ORTHOPAEDIC SURGERY
Payer: MEDICARE

## 2023-04-25 ENCOUNTER — TELEPHONE (OUTPATIENT)
Dept: CARDIOLOGY | Facility: CLINIC | Age: 88
End: 2023-04-25
Payer: MEDICARE

## 2023-04-25 VITALS
DIASTOLIC BLOOD PRESSURE: 65 MMHG | TEMPERATURE: 98 F | WEIGHT: 137 LBS | BODY MASS INDEX: 25.21 KG/M2 | SYSTOLIC BLOOD PRESSURE: 150 MMHG | HEIGHT: 62 IN | HEART RATE: 61 BPM | OXYGEN SATURATION: 98 %

## 2023-04-25 VITALS — HEIGHT: 62 IN | BODY MASS INDEX: 25.19 KG/M2 | WEIGHT: 136.88 LBS

## 2023-04-25 DIAGNOSIS — M17.11 PRIMARY OSTEOARTHRITIS OF RIGHT KNEE: Primary | ICD-10-CM

## 2023-04-25 DIAGNOSIS — F41.9 ANXIETY: Chronic | ICD-10-CM

## 2023-04-25 DIAGNOSIS — I70.0 AORTIC ATHEROSCLEROSIS: Chronic | ICD-10-CM

## 2023-04-25 DIAGNOSIS — L60.0 INGROWN TOENAIL OF RIGHT FOOT: ICD-10-CM

## 2023-04-25 DIAGNOSIS — M47.812 CERVICAL SPONDYLOSIS: ICD-10-CM

## 2023-04-25 DIAGNOSIS — I65.23 BILATERAL CAROTID ARTERY STENOSIS: Chronic | ICD-10-CM

## 2023-04-25 DIAGNOSIS — I10 HYPERTENSION, ESSENTIAL: Chronic | ICD-10-CM

## 2023-04-25 DIAGNOSIS — M79.604 PAIN OF RIGHT LOWER EXTREMITY: ICD-10-CM

## 2023-04-25 DIAGNOSIS — M17.11 PRIMARY OSTEOARTHRITIS OF RIGHT KNEE: ICD-10-CM

## 2023-04-25 DIAGNOSIS — N18.32 STAGE 3B CHRONIC KIDNEY DISEASE: Chronic | ICD-10-CM

## 2023-04-25 DIAGNOSIS — D53.9 MACROCYTIC ANEMIA: ICD-10-CM

## 2023-04-25 DIAGNOSIS — M54.17 LUMBOSACRAL RADICULOPATHY AT L5: ICD-10-CM

## 2023-04-25 DIAGNOSIS — Z85.3 PERSONAL HISTORY OF BREAST CANCER: ICD-10-CM

## 2023-04-25 DIAGNOSIS — E78.5 HYPERLIPIDEMIA, UNSPECIFIED HYPERLIPIDEMIA TYPE: Chronic | ICD-10-CM

## 2023-04-25 DIAGNOSIS — I77.1 SUBCLAVIAN ARTERY STENOSIS, RIGHT: ICD-10-CM

## 2023-04-25 DIAGNOSIS — Z01.818 PREOPERATIVE EXAMINATION: Primary | ICD-10-CM

## 2023-04-25 DIAGNOSIS — R60.0 LOWER EXTREMITY EDEMA: ICD-10-CM

## 2023-04-25 PROCEDURE — 73700 CT LOWER EXTREMITY W/O DYE: CPT | Mod: 26,RT,, | Performed by: RADIOLOGY

## 2023-04-25 PROCEDURE — 73700 CT KNEE WITHOUT CONTRAST RIGHT: ICD-10-PCS | Mod: 26,RT,, | Performed by: RADIOLOGY

## 2023-04-25 PROCEDURE — 99499 UNLISTED E&M SERVICE: CPT | Mod: S$PBB,,, | Performed by: PHYSICIAN ASSISTANT

## 2023-04-25 PROCEDURE — 99215 OFFICE O/P EST HI 40 MIN: CPT | Mod: S$PBB,,, | Performed by: NURSE PRACTITIONER

## 2023-04-25 PROCEDURE — 99213 OFFICE O/P EST LOW 20 MIN: CPT | Mod: PBBFAC,25 | Performed by: ORTHOPAEDIC SURGERY

## 2023-04-25 PROCEDURE — 99999 PR PBB SHADOW E&M-EST. PATIENT-LVL IV: ICD-10-PCS | Mod: PBBFAC,,, | Performed by: NURSE PRACTITIONER

## 2023-04-25 PROCEDURE — 99999 PR PBB SHADOW E&M-EST. PATIENT-LVL IV: CPT | Mod: PBBFAC,,, | Performed by: NURSE PRACTITIONER

## 2023-04-25 PROCEDURE — 99999 PR PBB SHADOW E&M-EST. PATIENT-LVL III: ICD-10-PCS | Mod: PBBFAC,,, | Performed by: ORTHOPAEDIC SURGERY

## 2023-04-25 PROCEDURE — 73700 CT LOWER EXTREMITY W/O DYE: CPT | Mod: TC,RT

## 2023-04-25 PROCEDURE — 99499 NO LOS: ICD-10-PCS | Mod: S$PBB,,, | Performed by: PHYSICIAN ASSISTANT

## 2023-04-25 PROCEDURE — 73560 X-RAY EXAM OF KNEE 1 OR 2: CPT | Mod: 26,RT,, | Performed by: RADIOLOGY

## 2023-04-25 PROCEDURE — 99213 OFFICE O/P EST LOW 20 MIN: CPT | Mod: S$PBB,,, | Performed by: ORTHOPAEDIC SURGERY

## 2023-04-25 PROCEDURE — 99215 PR OFFICE/OUTPT VISIT, EST, LEVL V, 40-54 MIN: ICD-10-PCS | Mod: S$PBB,,, | Performed by: NURSE PRACTITIONER

## 2023-04-25 PROCEDURE — 73560 X-RAY EXAM OF KNEE 1 OR 2: CPT | Mod: TC,RT

## 2023-04-25 PROCEDURE — 99999 PR PBB SHADOW E&M-EST. PATIENT-LVL III: CPT | Mod: PBBFAC,,, | Performed by: PHYSICIAN ASSISTANT

## 2023-04-25 PROCEDURE — 99213 OFFICE O/P EST LOW 20 MIN: CPT | Mod: PBBFAC,25,27 | Performed by: PHYSICIAN ASSISTANT

## 2023-04-25 PROCEDURE — 99999 PR PBB SHADOW E&M-EST. PATIENT-LVL III: CPT | Mod: PBBFAC,,, | Performed by: ORTHOPAEDIC SURGERY

## 2023-04-25 PROCEDURE — 99417 PROLNG OP E/M EACH 15 MIN: CPT | Mod: S$PBB,,, | Performed by: NURSE PRACTITIONER

## 2023-04-25 PROCEDURE — 73560 XR KNEE 1 OR 2 VIEW RIGHT: ICD-10-PCS | Mod: 26,RT,, | Performed by: RADIOLOGY

## 2023-04-25 PROCEDURE — 99214 OFFICE O/P EST MOD 30 MIN: CPT | Mod: PBBFAC,27 | Performed by: NURSE PRACTITIONER

## 2023-04-25 PROCEDURE — 99213 PR OFFICE/OUTPT VISIT, EST, LEVL III, 20-29 MIN: ICD-10-PCS | Mod: S$PBB,,, | Performed by: ORTHOPAEDIC SURGERY

## 2023-04-25 PROCEDURE — 99999 PR PBB SHADOW E&M-EST. PATIENT-LVL III: ICD-10-PCS | Mod: PBBFAC,,, | Performed by: PHYSICIAN ASSISTANT

## 2023-04-25 PROCEDURE — 99417 PR PROLONGED SVC, OUTPT, W/WO DIRECT PT CONTACT,  EA ADDTL 15 MIN: ICD-10-PCS | Mod: S$PBB,,, | Performed by: NURSE PRACTITIONER

## 2023-04-25 NOTE — HPI
This is a 89 y.o. female  who presents today for a preoperative evaluation in preparation for an  Orthopedic  procedure.  Scheduled for  right knee arthroplasty.   Surgery is indicated for osteoarthritis of right knee .  I have seen this patient before in August 2020 for preop eval before left knee surgery.   Details of current problem: The duration of problem is  many years.History of bilateral knee pain; S/P left knee arthroplasty 8/10/20. She denies any post-op complications. She is now ready for right knee surgery. Pain is becoming progressively worse.   Reports symptoms of  intermittent aching pain to right knee with instability.  Aggravating factors include:  standing, walking, and decreased ADLs.   Relieving factors are  Tylenol Arthritis prn.   Denies pain while witting; uses Rollator for assistance with mobility.    The history has been obtained by speaking with the patient and reviewing the electronic medical record and/or outside health information. Significant health conditions for the perioperative period are discussed below in assessment and plan.     Patient reports current health status to be Good.  Denies any new symptoms before surgery.

## 2023-04-25 NOTE — PROGRESS NOTES
Pre-op R TKA 5/22/23    Exam unchanged    Has a bunion on right foot, had ingrown toe which she tried to cut out with cuticle scissors about a week ago, got red after that, has been putting bacitracin on it and band-aid, denies pain or drainage, does not look actively infected today.     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  They have tried non-operative treatment including analgesics, an exercise program, and activity modification, but the symptoms have persisted. I believe they make a good candidate for knee arthroplasty.     The risks and benefits of knee arthroplasty have been discussed with the patient which include, but are not limited to infections (including severe sequelae), potential component failure, fracture, DVT, pulmonary embolus, nerve palsy, poor range of motion, the possibility of bone grafting, persistent pain, wound healing complications, transfusions, medical complications and death.     We discussed surgical options including implant type and why I believe the implant selected is a good match for the patient's needs. The patient expressed understanding and agrees to proceed with the planned surgery.     Pre-operative planning will include the following:  A pre-surgical evaluation by will be arranged.  Pre-op orders will be placed.  We will make arrangements with the operating room for proper time and staffing.  We will make Social Service arrangements for the patient.  Implants:   Company: [employer]  System: [employer]  CR/CS  universal tray  conventional poly     PS/TS backup  50 stem backup  +/- resurface patella     CT scan: QUYEN  protocol for operative planning   Additional discussion about pin sites: risk of wound healing issues and fracture        DVT prophylaxis: ASA 81mg BID x1 month    Dispo: SNF - lives alone, niece owns SNF, went to SNF after L TKA  On [facility]     Admission status: Inpatient  - Monday only  Location: [place]      R TKA 5/22/23 (Monday only at [facility] for snf discharge)     Rx cefadroxil - SNF dispo     Patient meets inpatient criteria due to pre-op debility, medical comorbidities, complex nature of surgery, lack of social support at home    10 medication allergies  Dx of fibro, no meds  R hybrid ELZBIETA LO  L TKA unresurffaced patella 2020 siegel  C spine injections  L spine radicular pain, no injections  Osteopenia/porosis  CKD 1.2/43  Fall risk, recent falls        INPATIENT ADMISSION STATUS  Per CMS MLN Matters Number [id number]:  X   I believe that this patient meets inpatient admission status criteria as there is a reasonable expectation of medically necessary hospital services for 2 midnights or longer including all outpatient/observation and inpatient care time     X   Please see the supporting documentation in the medical record that supports the admitting physicians determination that the patient required inpatient care despite the lack of a 2-midnight expectation based upon complex medical factors including but not limited to:  -Patients history, co-morbidities and current medical needs  -Risk of adverse events  -Severity of signs and symptoms    X

## 2023-04-25 NOTE — TELEPHONE ENCOUNTER
----- Message from Amy Blanco LPN sent at 4/25/2023  2:49 PM CDT -----  Regarding: Cardiac clearance  Good afternoon ,      is having a RTKA on 5/22/23 with . The surgery will last approximately 196 minutes under Regional Anesthesia. Requesting clearance from chart or an appointment. Please advise.    Thank you,     DIANN Reyes  Pre op/Anesthesia

## 2023-04-25 NOTE — DISCHARGE INSTRUCTIONS
Your surgery has been scheduled for:_______5/22/23___________________________________    You should report to:  ____[facility], located on the [place] side of the first floor of the           [facility] ([phone])  ____[facility], located on the [facility] side of the            Second floor of the [facility] ([phone])  ____3rd Floor [facility] located on the [facility] side of the [facility] [phone]  _X___[facility]: located at [address] [facility] [place] [address]. [address]     Please Note   Tell your doctor if you take Aspirin, products containing Aspirin, herbal medications  or blood thinners, such as Coumadin, Ticlid, or Plavix.  (Consult your provider regarding holding or stopping before surgery).  Arrange for someone to drive you home following surgery.  You will not be allowed to leave the surgical facility alone or drive yourself home following sedation and anesthesia.    Before Surgery  Stop taking all herbal medications, vitamins, and supplements 7 days prior to surgery  No Motrin/Advil (Ibuprofen) 7 days before surgery  No Aleve (Naproxen) 7 days before surgery  Stop Taking Asprin, products containing Aspirin __7___days before surgery   No Goody's/BC Powder 7 days before surgery  Refrain from drinking alcoholic beverages for 24 hours before and after surgery  Stop or limit smoking at least 24 hours prior to surgery  You may take Tylenol for pain    Night before Surgery  Do not eat or drink after midnight  Take a shower or bath (shower is recommended).  Bathe with Hibiclens soap or an antibacterial soap from the neck down.  If not supplied by your surgeon, hibiclens soap will need to be purchased over the counter in pharmacy.  Rinse soap off thoroughly.  Shampoo your hair with your regular shampoo    The Day of Surgery  Take another bath or shower with hibiclens  or any antibacterial soap, to reduce the chance of infection.  Take heart and blood pressure medications with a small sip of water, as advised by the perioperative team.  Do not take fluid pills  You may brush your teeth and rinse your mouth, but do not swallow any additional water.   Do not apply perfumes, powder, body lotions or deodorant on the day of surgery.  Nail polish should be removed.  Do not wear makeup or moisturizer  Wear comfortable clothes, such as a button front shirt and loose fitting pants.  Leave all jewelry, including body piercings, and valuables at home.    Bring any devices you will neeed after surgery such as crutches or canes.  If you have sleep apnea, please bring your CPAP machine  In the event that your physical condition changes including the onset of a cold or respiratory illness, or if you have to delay or cancel your surgery, please notify your surgeon.

## 2023-04-25 NOTE — PROGRESS NOTES
[facility]  Progress Note    Patient Name: Gerda Lai  MRN: [id number]  Date of Evaluation- 05/08/2023  PCP- Tomas Beltran MD    Future cases for Gerda Lai [[id number]]       Case ID Status Date Time Jack Procedure Provider Location    [id number] [facility] 5/22/2023 10:05  ARTHROPLASTY, KNEE, TOTAL, USING COMPUTER-ASSISTED NAVIGATION: QUYEN: RIGHT: MARTHA - TRIATHALON Milton Cobian III, MD [[id number]] [facility] OR            HPI:  This is a 89 y.o. female  who presents today for a preoperative evaluation in preparation for an  Orthopedic  procedure.  Scheduled for  right knee arthroplasty.   Surgery is indicated for osteoarthritis of right knee .  I have seen this patient before in August 2020 for preop eval before left knee surgery.   Details of current problem: The duration of problem is  many years.History of bilateral knee pain; S/P left knee arthroplasty 8/10/20. She denies any post-op complications. She is now ready for right knee surgery. Pain is becoming progressively worse.   Reports symptoms of  intermittent aching pain to right knee with instability.  Aggravating factors include:  standing, walking, and decreased ADLs.   Relieving factors are  Tylenol Arthritis prn.   Denies pain while witting; uses Rollator for assistance with mobility.    The history has been obtained by speaking with the patient and reviewing the electronic medical record and/or outside health information. Significant health conditions for the perioperative period are discussed below in assessment and plan.     Patient reports current health status to be Good.  Denies any new symptoms before surgery.        Subjective/ Objective:     Chief Complaint: Preoperative evaulation, perioperative medical management, and complication reduction plan.     Functional Capacity: no regular exercise regimen; parked in garage and walked to POC with Rollator and without CP/SOB.       Anesthesia issues: None    Difficulty mouth opening:  No      Steroid use in the last 12 months:  No    Dental Issues:No    Family anesthesia difficulty: None         Family Hx of Thrombosis: None    Past Medical History:   Diagnosis Date    Anxiety     Arthritis     Basal cell carcinoma 09/2016    right post auricular neck     Basal cell carcinoma of right forearm 04/19/2023    R lower forearm    Breast cancer 1992    right    Cataract     Fibromyalgia     History of measles as a child     Hyperlipidemia     Hypertension     Personal history of colonic polyps     Pneumonia     SCC (squamous cell carcinoma) 2015    R chest    SCC (squamous cell carcinoma) 2016    left medial shoulder    SCC (squamous cell carcinoma) 2017    left knee    SCC (squamous cell carcinoma) 2022    L upper chest, R upper arm KA types    Shingles     Squamous cell carcinoma 2015    right forearm    Thyroid disease     Vaginitis          Past Medical History Pertinent Negatives:   Diagnosis Date Noted    Amblyopia 09/14/2012    Asthma 08/07/2020    Cataract 09/14/2012    COPD (chronic obstructive pulmonary disease) 08/07/2020    Coronary artery disease 08/07/2020    Deep vein thrombosis 08/07/2020    Diabetes mellitus 09/14/2012    Diabetes mellitus, type 2 08/07/2020    Diabetic retinopathy 09/14/2012    Diabetic retinopathy 02/05/2014    Glaucoma 09/14/2012    Glaucoma 02/05/2014    Macular degeneration 09/14/2012    Myocardial infarction 08/07/2020    Pulmonary embolism 08/07/2020    Retinal detachment 09/14/2012    Seizures 08/07/2020    Strabismus 09/14/2012    Stroke 08/07/2020    Uveitis 09/14/2012         Past Surgical History:   Procedure Laterality Date    ADENOIDECTOMY      BREAST BIOPSY Left     Excisional bx, benign    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    CARPAL TUNNEL RELEASE Right 3/16/2021    Procedure: RELEASE, CARPAL TUNNEL;  Surgeon: Barbara Aldridge MD;  Location: [facility];  Service: Orthopedics;  Laterality: Right;    CATARACT EXTRACTION W/  INTRAOCULAR LENS IMPLANT  Bilateral     EPIDURAL STEROID INJECTION N/A 2/24/2023    Procedure: HEIDI C7-T1;  Surgeon: Andi Cisneros DO;  Location: [facility] OR;  Service: Pain Management;  Laterality: N/A;    EYE SURGERY      HAND SURGERY      INJECTION OF ANESTHETIC AGENT AROUND MEDIAL BRANCH NERVES INNERVATING CERVICAL FACET JOINT N/A 1/4/2022    Procedure: Cervical Medial Branch Block C5-6, C6-7 (No Sedation);  Surgeon: Himanshu Blackmon Jr., MD;  Location: [facility];  Service: Pain Management;  Laterality: N/A;    INJECTION OF ANESTHETIC AGENT AROUND MEDIAL BRANCH NERVES INNERVATING LUMBAR FACET JOINT Left 11/18/2021    Procedure: Cervical Medial Branch Block Left C5-6, C6-7 (IV Sedation);  Surgeon: Himanshu Blackmon Jr., MD;  Location: [facility];  Service: Pain Management;  Laterality: Left;    JOINT REPLACEMENT Right     ELZBIETA    KNEE ARTHROPLASTY Left 8/10/2020    Procedure: ARTHROPLASTY, KNEE-ADE NEXGEN/CEMENTED TIBIA;  Surgeon: Kalin Pacheco MD;  Location: [facility] OR;  Service: Orthopedics;  Laterality: Left;    RADIOFREQUENCY THERMAL COAGULATION OF MEDIAL BRANCH OF POSTERIOR RAMUS OF CERVICAL SPINAL NERVE Left 3/8/2022    Procedure: RADIOFREQUENCY THERMAL COAGULATION, NERVE, SPINAL, CERVICAL, LEFT C5-6 AND C6-7;  Surgeon: Himanshu Blackmon Jr., MD;  Location: [facility];  Service: Pain Management;  Laterality: Left;  NO PACEMAKER   ASA    thyriodectomy      partial    tonsillectomy      TONSILLECTOMY      TOTAL HIP ARTHROPLASTY      right    Yag  Left        Review of Systems   Constitutional:  Negative for chills, fatigue, fever and unexpected weight change.   HENT:  Positive for hearing loss (wears hearing aids: hears worse in right ear) and postnasal drip. Negative for dental problem, rhinorrhea, sore throat, tinnitus and trouble swallowing.    Eyes:  Negative for photophobia, pain, discharge and visual disturbance.   Respiratory:  Negative for apnea, cough, chest tightness, shortness of breath and wheezing.   "  Cardiovascular:  Positive for leg swelling. Negative for chest pain and palpitations.   Gastrointestinal:  Positive for constipation (Metamucil daily; apples). Negative for abdominal pain, blood in stool, nausea and vomiting.        Denies Fatty liver, Hepatitis   Endocrine: Negative for cold intolerance and heat intolerance.   Genitourinary:  Negative for decreased urine volume, difficulty urinating, dysuria, frequency, hematuria and urgency.   Musculoskeletal:  Positive for arthralgias (right knee), gait problem and neck pain. Negative for back pain and neck stiffness.        Right arm- limited mobility (history of torn rotator cuff)   Skin:  Negative for rash and wound.   Neurological:  Positive for numbness (bilateral hand). Negative for dizziness, tremors, seizures, syncope, weakness and headaches.   Hematological:  Negative for adenopathy. Bruises/bleeds easily.   Psychiatric/Behavioral:  Negative for confusion, sleep disturbance and suicidal ideas.             VITALS  Visit Vitals  BP (!) 150/65 (BP Location: Left arm, Patient Position: Sitting)   Pulse 61   Temp 97.8 °F (36.6 °C) (Oral)   Ht 5' 2" (1.575 m)   Wt 62.1 kg (137 lb)   SpO2 98%   BMI 25.06 kg/m²          Physical Exam  Vitals reviewed.   Constitutional:       General: She is not in acute distress.     Appearance: She is well-developed.   HENT:      Head: Normocephalic.      Nose: Nose normal.      Mouth/Throat:      Pharynx: No oropharyngeal exudate.   Eyes:      General:         Right eye: No discharge.         Left eye: No discharge.      Conjunctiva/sclera: Conjunctivae normal.      Pupils: Pupils are equal, round, and reactive to light.   Neck:      Thyroid: No thyromegaly.      Vascular: No carotid bruit or JVD.      Trachea: No tracheal deviation.   Cardiovascular:      Rate and Rhythm: Regular rhythm. Bradycardia present.      Pulses:           Carotid pulses are 2+ on the right side and 2+ on the left side.       Dorsalis pedis pulses " are 2+ on the right side and 1+ on the left side.        Posterior tibial pulses are 2+ on the right side and 1+ on the left side.      Heart sounds: Normal heart sounds. No murmur heard.  Pulmonary:      Effort: Pulmonary effort is normal. No respiratory distress.      Breath sounds: Normal breath sounds. No stridor. No wheezing, rhonchi or rales.   Abdominal:      General: Bowel sounds are normal. There is no distension.      Palpations: Abdomen is soft.      Tenderness: There is no abdominal tenderness. There is no guarding.   Musculoskeletal:      Cervical back: Pain with movement (with extension and lateral movement) present. Decreased range of motion (with extension and lateral movement).      Right lower le+ Pitting Edema present.      Left lower leg: Edema (trace) present.   Lymphadenopathy:      Cervical: No cervical adenopathy.   Skin:     General: Skin is warm and dry.      Capillary Refill: Capillary refill takes less than 2 seconds.      Findings: No erythema or rash.   Neurological:      Mental Status: She is alert and oriented to person, place, and time.      Coordination: Coordination normal.        Significant Labs:  Lab Results   Component Value Date    WBC 9.02 2023    HGB 11.3 (L) 2023    HCT 35.5 (L) 2023     2023    CHOL 171 2023    TRIG 163 (H) 2023    HDL 54 2023    ALT 15 2023    AST 22 2023     2023    K 3.9 2023     2023    CREATININE 0.9 2023    BUN 26 (H) 2023    CO2 19 (L) 2023    TSH 1.821 2022    INR 1.0 2023    HGBA1C 5.5 2011       Diagnostic Studies: No relevant studies.    EKG:   Results for orders placed or performed in visit on 23   IN OFFICE EKG 12-LEAD (to [place])    Collection Time: 23  1:26 PM    Narrative    Test Reason : I10,    Vent. Rate : 059 BPM     Atrial Rate : 059 BPM     P-R Int : 182 ms          QRS Dur : 090 ms      QT Int :  452 ms       P-R-T Axes : 070 061 068 degrees     QTc Int : 447 ms    Sinus bradycardia  Otherwise normal ECG  When compared with ECG of 21-JUN-2022 14:43,  No significant change was found  Confirmed by Lew Howell MD ([id number]) on 3/28/2023 3:35:35 PM    Referred By:  DOLORES           Confirmed By:Lew Howell MD       2D ECHO: 2017  TTE:  CONCLUSIONS     1 - Normal left ventricular systolic function (EF 55-60%).     2 - Normal left ventricular diastolic function.     3 - Normal right ventricular systolic function .     No evidence of stress induced myocardial ischemia.         Imaging   US head and Neck 1/27/23    Impression:     Multinodular thyroid with no nodules meeting criteria for FNA or continued surveillance.     Heterogeneous structure at midline near the clavicle as discussed above, possibly related to sternoclavicular joint and reportedly stable for many years.  Recommend clinical correlation and further evaluation/follow-up as warranted.     Electronically signed by resident: Cristina Guerrero  Date:                                            01/27/2023  Time:                                           16:58     Electronically signed by: Harrison Coppola  Date:                                            01/28/2023  Time:                                           11:37        MRI C-Spine 1/18/23  Impression:     1. Multilevel degenerative changes of the cervical spine detailed above.  Moderate spinal canal stenosis noted at the level of C1 and C4-C5.  Moderate to severe neural foraminal narrowing noted at C2-C6.  2. Fusion across the C3-C4 disc space and left-sided C2-C4 facet joints.     Electronically signed by resident: aHnk Clark  Date:                                            01/18/2023  Time:                                           15:17     Electronically signed by: Sandip Carmona MD  Date:                                            01/18/2023  Time:                                            17:07    Active Cardiac Conditions: None      Revised Cardiac Risk Index   High -Risk Surgery  Intraperitoneal; Intrathoracic; suprainguinal vascular Yes- + 1 No- 0   History of Ischemic Heart Disease   (Hx of MI/positive exercise test/current chest pain due to ischemia/use of nitrate therapy/EKG with pathological Q waves) Yes- + 1 No- 0   History of CHF  (Pulmonary edema/bilateral rales or S3 gallop/PND/CXR showing pulmonary vascular redistribution) Yes- + 1 No- 0   History of CVA   (Prior stroke or TIA) Yes- + 1 No- 0   Pre-operative treatment with insulin Yes- + 1 No- 0   Pre-operative creatinine > 2mg/dl Yes- + 1 No- 0   Total:0      Risk Status:  Estimated risk of cardiac complications after non-cardiac surgery using the Revised Cardiac Risk Index for Preoperative risk is 3.9 %      ARISCAT (Canet) risk index: Intermediate: 13.3% risk of post-op pulmonary complications.    American Society of Anesthesiologists Physical Status classification (ASA): 3           No further cardiac workup needed prior to surgery.          Orders Placed This Encounter    CBC Auto Differential    Protime-INR           Assessment/Plan:     Cervical spondylosis  Difficulty lifting right arm- attributes to history of torn rotator cuff  Has N/T bilateral hand  Followed by: pain management - last seen 3/28/23  S/P HEIDY at C7-T1 on 2/20/23   S/P Acupuncture 4/21/23  Not followed per [facility]      Imaging: Xray C-Spine 12/5/22  FINDINGS:  No acute fractures.  Degenerative cervical spondylosis changes seen and described on earlier exam without significant change.  Reconfirmed reversal of normal cervical lordosis.  Unremarkable predental space.  No widening prevertebral soft tissues.  Reconfirmed extensive degenerative osteophyte formation anteriorly from C3 through C7.  Stable grade 1 retrolisthesis of C4 with respect to C5 and mild anterolisthesis C6 with respect to C7.  The flexion and extension views demonstrate no instability.   Stable disc narrowing C3-C4, C4-C5, C5-C6 levels.  Stable multilevel facet arthropathic changes and areas of ligamentum nuchae calcification.  Intact odontoid tip.  Some degenerative changes about the C1-C2 articulation.  As before, posterior elements at C2-C3 appear fuse.     Impression:     As above.            Lumbosacral radiculopathy at L5  Reports occasional back pain  No loss of bladder or bowel control    Denies N/T to buttocks, perineum and inner surfaces of the thighs (saddle anesthesia)        Recent imaging: Xray L-Spine 8/17/21  FINDINGS:  Five lumbar vertebral bodies.  Vertebral body heights are maintained.  Disc space narrowing and degenerative endplate changes L1-2, L2-3, L3-4, and L5-S1.  Moderate facet arthropathy L3-4 through L5-S1.     Grade 1 retrolisthesis L1 on L2 and L2 on L3.  Grade 1 anterolisthesis L4 on L5.     Impression:     Moderate degenerative change as given in detail above.              Anxiety  Treated with:  lorazepam every 12 hrs prn  ; controlled symptoms.   Denies suicidal/ homicidal ideations.       Hypertension, essential  Taking: aliskiren/felodipine/labetalol/spironolactone  Keeping a healthy weight/BMI can help with better BP control    Lifestyle changes to reduce systolic BP:  exercise 30 minutes per day,  5 days per week or 150 minutes weekly; sodium reduction and avoidance of high salt foods such as processed meats, frozen meals and  fast foods.     BP acceptable for surgery. I recommend monitoring BP during perioperative period as uncontrolled pain can elevate blood pressure.           HLD (hyperlipidemia)  Encouraged weight loss, healthy diet (DASH/Mediterranean) and exercise.   Patient should exercise 30 minutes at least five times weekly.Treated with: pravastatin    Aortic atherosclerosis   Per CXR 5/12/18; taking pravastatin 40 mg daily.    Carotid arterial disease  On ASA/pravastatin; followed per Cardiology    Carotid US results: 4/10/23  There is greater than  70% right Internal Carotid Stenosis.  There is greater than 70% left Internal Carotid Stenosis.  There is antegrade flow in the vertebral arteries bilaterally.  There is greater than 50% external carotid artery stenosis bilaterally.              Subclavian artery stenosis, right  Stable; followed per Cardiology.    CKD (chronic kidney disease), stage III  No changes in urine volume.   Most recent GFR: > 60   BUN=  26   Cr =  0.9; stable.  Encouraged to avoid NSAIDs, reduce salt intake, maintain adequate hydration, and exercise.    Tylenol as needed for pain    I  suggest monitoring renal function, input and output status janel-operatively. I  suggest avoiding nephrotoxic medication including NSAIDs, COX2 inhibitors, intravenous contrast agent,avoiding hypotension to prevent further renal impairment.        Personal history of breast cancer  Dx'd 28 years ago- right breast.  Treated with lumpectomy, radiation, and Tamoxifen.     Primary osteoarthritis of right knee  Scheduled with Dr. Cobian on 5/22/23 for right knee arthroplasty.     Lower extremity edema  R>L  I suggest avoidance of added salt and voidance of NSAID's- unless advised or ordered. I recommend limb elevation and yamilex hose use during perioperative period.    Macrocytic anemia  Current labs:  Hgb-  11.3    Hct-  35.3  B12 and folate last checked in 2020- normal    No alcohol use  Recommend monitoring during perioperative period.       Discussion/Management of Perioperative Care    Thromboembolic prophylaxis (VTE) Care: Risk factors for thrombosis include: age, decreased mobility, and surgical procedure.  I recommend prophylaxis of thromboembolism with the use of compression stockings/pneumatic devices, and/or pharmacologic agents. The benefits should outweigh the risks for pharmacologic prophylaxis in the perioperative period. I also encourage early ambulation if not contraindicated during the post-operative period.    Risk factors for post-operative  pulmonary complications include:age > 65 years, HTN, pre-existing renal disease, and surgery > 3 hours. To reduce the risk of pulmonary complications, prophylactic recommendations include: incentive spirometry use/deep breathing, early ambulation, and pain control.    Risk factors for renal complications include: Pre-existing renal disease, age, and HTN. To reduce the risk of postoperative renal complications, I recommend the patient maintain adequate fluid volume status by drinking 2 liters of water daily.  Avoid/reduce NSAIDS and FIELDS-2 inhibitors use as well as IV contrast for renal protection.    I recommend the use of appropriate prophylactic antibiotics to reduce the risk of surgical site infections.    Delirium risk factors include advanced age, decreased mobility, and benzodiazepine use. I recommend to avoid/reduce use of benzodiazepine use (not for patients who take on a regular basis), anticholinergics, Benadryl,  and agents that may cause postoperative serotonin syndrome.  Controlled pain can decrease the risk for postop delirium and since opioids are used for postoperative pain control, I suggest using the lowest dose for the shortest amount of time necessary for pain management.     The patient is at an increased risk for urinary retention due to : possible regional anesthesia and advanced age. I recommend to avoid/decrease the use of benzodiazepines, anticholinergics, and Benadryl in the perioperative period. I also recommend using opioids for the shortest period of time if possible.          This visit was focused on Preoperative evaluation, Perioperative Medical management, complication reduction plans. I suggest that the patient follows up with primary care or relevant sub specialists for ongoing health care.    I appreciate the opportunity to be involved in this patients care. Please feel free to contact me if there were any questions about this consultation.        I spent a total of 74 minutes on  the day of the visit.This includes face to face time and non-face to face time preparing to see the patient (e.g., review of tests), obtaining and/or reviewing separately obtained history, documenting clinical information in the electronic or other health record, independently interpreting results and communicating results to the patient/family/caregiver, or care coordinator.       Patient is optimized for surgery.      4/28/23: Repeat BMP per Cards- Dr. Camarnea before optimization.    5/8/23: Optimized per Cardiology (Nicol) 5/6/23      Aubrie Antoine NP  Perioperative Medicine  [facility]

## 2023-04-25 NOTE — OUTPATIENT SUBJECTIVE & OBJECTIVE
Outpatient Subjective & Objective      Chief Complaint: Preoperative evaulation, perioperative medical management, and complication reduction plan.     Functional Capacity: no regular exercise regimen; parked in garage and walked to POC with Rollator and without CP/SOB.       Anesthesia issues: None    Difficulty mouth opening: No      Steroid use in the last 12 months:  No    Dental Issues:No    Family anesthesia difficulty: None         Family Hx of Thrombosis: None    Past Medical History:   Diagnosis Date    Anxiety     Arthritis     Basal cell carcinoma 09/2016    right post auricular neck     Basal cell carcinoma of right forearm 04/19/2023    R lower forearm    Breast cancer 1992    right    Cataract     Fibromyalgia     History of measles as a child     Hyperlipidemia     Hypertension     Personal history of colonic polyps     Pneumonia     SCC (squamous cell carcinoma) 2015    R chest    SCC (squamous cell carcinoma) 2016    left medial shoulder    SCC (squamous cell carcinoma) 2017    left knee    SCC (squamous cell carcinoma) 2022    L upper chest, R upper arm KA types    Shingles     Squamous cell carcinoma 2015    right forearm    Thyroid disease     Vaginitis          Past Medical History Pertinent Negatives:   Diagnosis Date Noted    Amblyopia 09/14/2012    Asthma 08/07/2020    Cataract 09/14/2012    COPD (chronic obstructive pulmonary disease) 08/07/2020    Coronary artery disease 08/07/2020    Deep vein thrombosis 08/07/2020    Diabetes mellitus 09/14/2012    Diabetes mellitus, type 2 08/07/2020    Diabetic retinopathy 09/14/2012    Diabetic retinopathy 02/05/2014    Glaucoma 09/14/2012    Glaucoma 02/05/2014    Macular degeneration 09/14/2012    Myocardial infarction 08/07/2020    Pulmonary embolism 08/07/2020    Retinal detachment 09/14/2012    Seizures 08/07/2020    Strabismus 09/14/2012    Stroke 08/07/2020    Uveitis 09/14/2012         Past Surgical History:   Procedure Laterality Date     ADENOIDECTOMY      BREAST BIOPSY Left     Excisional bx, benign    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    CARPAL TUNNEL RELEASE Right 3/16/2021    Procedure: RELEASE, CARPAL TUNNEL;  Surgeon: Barbara Aldridge MD;  Location: [facility] OR;  Service: Orthopedics;  Laterality: Right;    CATARACT EXTRACTION W/  INTRAOCULAR LENS IMPLANT Bilateral     EPIDURAL STEROID INJECTION N/A 2/24/2023    Procedure: HEIDI C7-T1;  Surgeon: Andi Cisneros DO;  Location: [facility] OR;  Service: Pain Management;  Laterality: N/A;    EYE SURGERY      HAND SURGERY      INJECTION OF ANESTHETIC AGENT AROUND MEDIAL BRANCH NERVES INNERVATING CERVICAL FACET JOINT N/A 1/4/2022    Procedure: Cervical Medial Branch Block C5-6, C6-7 (No Sedation);  Surgeon: Himanshu Blackmon Jr., MD;  Location: [facility] PAIN MGT;  Service: Pain Management;  Laterality: N/A;    INJECTION OF ANESTHETIC AGENT AROUND MEDIAL BRANCH NERVES INNERVATING LUMBAR FACET JOINT Left 11/18/2021    Procedure: Cervical Medial Branch Block Left C5-6, C6-7 (IV Sedation);  Surgeon: Himanshu Blackmon Jr., MD;  Location: [facility] PAIN MGT;  Service: Pain Management;  Laterality: Left;    JOINT REPLACEMENT Right     ELZBIETA    KNEE ARTHROPLASTY Left 8/10/2020    Procedure: ARTHROPLASTY, KNEE-ADE NEXGEN/CEMENTED TIBIA;  Surgeon: Kalin Pacheco MD;  Location: [facility] OR;  Service: Orthopedics;  Laterality: Left;    RADIOFREQUENCY THERMAL COAGULATION OF MEDIAL BRANCH OF POSTERIOR RAMUS OF CERVICAL SPINAL NERVE Left 3/8/2022    Procedure: RADIOFREQUENCY THERMAL COAGULATION, NERVE, SPINAL, CERVICAL, LEFT C5-6 AND C6-7;  Surgeon: Himanshu Blackmon Jr., MD;  Location: [facility] PAIN MGT;  Service: Pain Management;  Laterality: Left;  NO PACEMAKER   ASA    thyriodectomy      partial    tonsillectomy      TONSILLECTOMY      TOTAL HIP ARTHROPLASTY      right    Yag  Left        Review of Systems   Constitutional:  Negative for chills, fatigue, fever and unexpected weight change.   HENT:  Positive for hearing loss  "(wears hearing aids: hears worse in right ear) and postnasal drip. Negative for dental problem, rhinorrhea, sore throat, tinnitus and trouble swallowing.    Eyes:  Negative for photophobia, pain, discharge and visual disturbance.   Respiratory:  Negative for apnea, cough, chest tightness, shortness of breath and wheezing.    Cardiovascular:  Positive for leg swelling. Negative for chest pain and palpitations.   Gastrointestinal:  Positive for constipation (Metamucil daily; apples). Negative for abdominal pain, blood in stool, nausea and vomiting.        Denies Fatty liver, Hepatitis   Endocrine: Negative for cold intolerance and heat intolerance.   Genitourinary:  Negative for decreased urine volume, difficulty urinating, dysuria, frequency, hematuria and urgency.   Musculoskeletal:  Positive for arthralgias (right knee), gait problem and neck pain. Negative for back pain and neck stiffness.        Right arm- limited mobility (history of torn rotator cuff)   Skin:  Negative for rash and wound.   Neurological:  Positive for numbness (bilateral hand). Negative for dizziness, tremors, seizures, syncope, weakness and headaches.   Hematological:  Negative for adenopathy. Bruises/bleeds easily.   Psychiatric/Behavioral:  Negative for confusion, sleep disturbance and suicidal ideas.             VITALS  Visit Vitals  BP (!) 150/65 (BP Location: Left arm, Patient Position: Sitting)   Pulse 61   Temp 97.8 °F (36.6 °C) (Oral)   Ht 5' 2" (1.575 m)   Wt 62.1 kg (137 lb)   SpO2 98%   BMI 25.06 kg/m²          Physical Exam  Vitals reviewed.   Constitutional:       General: She is not in acute distress.     Appearance: She is well-developed.   HENT:      Head: Normocephalic.      Nose: Nose normal.      Mouth/Throat:      Pharynx: No oropharyngeal exudate.   Eyes:      General:         Right eye: No discharge.         Left eye: No discharge.      Conjunctiva/sclera: Conjunctivae normal.      Pupils: Pupils are equal, round, and " reactive to light.   Neck:      Thyroid: No thyromegaly.      Vascular: No carotid bruit or JVD.      Trachea: No tracheal deviation.   Cardiovascular:      Rate and Rhythm: Regular rhythm. Bradycardia present.      Pulses:           Carotid pulses are 2+ on the right side and 2+ on the left side.       Dorsalis pedis pulses are 2+ on the right side and 1+ on the left side.        Posterior tibial pulses are 2+ on the right side and 1+ on the left side.      Heart sounds: Normal heart sounds. No murmur heard.  Pulmonary:      Effort: Pulmonary effort is normal. No respiratory distress.      Breath sounds: Normal breath sounds. No stridor. No wheezing, rhonchi or rales.   Abdominal:      General: Bowel sounds are normal. There is no distension.      Palpations: Abdomen is soft.      Tenderness: There is no abdominal tenderness. There is no guarding.   Musculoskeletal:      Cervical back: Pain with movement (with extension and lateral movement) present. Decreased range of motion (with extension and lateral movement).      Right lower le+ Pitting Edema present.      Left lower leg: Edema (trace) present.   Lymphadenopathy:      Cervical: No cervical adenopathy.   Skin:     General: Skin is warm and dry.      Capillary Refill: Capillary refill takes less than 2 seconds.      Findings: No erythema or rash.   Neurological:      Mental Status: She is alert and oriented to person, place, and time.      Coordination: Coordination normal.        Significant Labs:  Lab Results   Component Value Date    WBC 9.02 2023    HGB 11.3 (L) 2023    HCT 35.5 (L) 2023     2023    CHOL 171 2023    TRIG 163 (H) 2023    HDL 54 2023    ALT 15 2023    AST 22 2023     2023    K 3.9 2023     2023    CREATININE 0.9 2023    BUN 26 (H) 2023    CO2 19 (L) 2023    TSH 1.821 2022    INR 1.0 2023    HGBA1C 5.5 2011        Diagnostic Studies: No relevant studies.    EKG:   Results for orders placed or performed in visit on 03/28/23   IN OFFICE EKG 12-LEAD (to [place])    Collection Time: 03/28/23  1:26 PM    Narrative    Test Reason : I10,    Vent. Rate : 059 BPM     Atrial Rate : 059 BPM     P-R Int : 182 ms          QRS Dur : 090 ms      QT Int : 452 ms       P-R-T Axes : 070 061 068 degrees     QTc Int : 447 ms    Sinus bradycardia  Otherwise normal ECG  When compared with ECG of 21-JUN-2022 14:43,  No significant change was found  Confirmed by Lew Howell MD ([id number]) on 3/28/2023 3:35:35 PM    Referred By:  DOLORES           Confirmed By:Lew Howell MD       2D ECHO: 2017  TTE:  CONCLUSIONS     1 - Normal left ventricular systolic function (EF 55-60%).     2 - Normal left ventricular diastolic function.     3 - Normal right ventricular systolic function .     No evidence of stress induced myocardial ischemia.         Imaging   US head and Neck 1/27/23    Impression:     Multinodular thyroid with no nodules meeting criteria for FNA or continued surveillance.     Heterogeneous structure at midline near the clavicle as discussed above, possibly related to sternoclavicular joint and reportedly stable for many years.  Recommend clinical correlation and further evaluation/follow-up as warranted.     Electronically signed by resident: Cristina Guerrero  Date:                                            01/27/2023  Time:                                           16:58     Electronically signed by: Harrison Coppola  Date:                                            01/28/2023  Time:                                           11:37        MRI C-Spine 1/18/23  Impression:     1. Multilevel degenerative changes of the cervical spine detailed above.  Moderate spinal canal stenosis noted at the level of C1 and C4-C5.  Moderate to severe neural foraminal narrowing noted at C2-C6.  2. Fusion across the C3-C4 disc space and left-sided C2-C4 facet  joints.     Electronically signed by resident: Hank Clark  Date:                                            01/18/2023  Time:                                           15:17     Electronically signed by: Sandip Carmona MD  Date:                                            01/18/2023  Time:                                           17:07    Active Cardiac Conditions: None      Revised Cardiac Risk Index   High -Risk Surgery  Intraperitoneal; Intrathoracic; suprainguinal vascular Yes- + 1 No- 0   History of Ischemic Heart Disease   (Hx of MI/positive exercise test/current chest pain due to ischemia/use of nitrate therapy/EKG with pathological Q waves) Yes- + 1 No- 0   History of CHF  (Pulmonary edema/bilateral rales or S3 gallop/PND/CXR showing pulmonary vascular redistribution) Yes- + 1 No- 0   History of CVA   (Prior stroke or TIA) Yes- + 1 No- 0   Pre-operative treatment with insulin Yes- + 1 No- 0   Pre-operative creatinine > 2mg/dl Yes- + 1 No- 0   Total:0      Risk Status:  Estimated risk of cardiac complications after non-cardiac surgery using the Revised Cardiac Risk Index for Preoperative risk is 3.9 %      ARISCAT (Canet) risk index: Intermediate: 13.3% risk of post-op pulmonary complications.    American Society of Anesthesiologists Physical Status classification (ASA): 3           No further cardiac workup needed prior to surgery.    Outpatient Subjective & Objective

## 2023-04-25 NOTE — TELEPHONE ENCOUNTER
Sent in basket message to  and staff requesting cardiac clearance from chart or an appointment to be cleared for upcoming surgery with .

## 2023-04-25 NOTE — ASSESSMENT & PLAN NOTE
R>L  I suggest avoidance of added salt and voidance of NSAID's- unless advised or ordered. I recommend limb elevation and yamilex hose use during perioperative period.

## 2023-04-26 ENCOUNTER — OFFICE VISIT (OUTPATIENT)
Dept: PODIATRY | Facility: CLINIC | Age: 88
End: 2023-04-26
Payer: MEDICARE

## 2023-04-26 VITALS
HEIGHT: 62 IN | BODY MASS INDEX: 25.03 KG/M2 | HEART RATE: 64 BPM | DIASTOLIC BLOOD PRESSURE: 51 MMHG | SYSTOLIC BLOOD PRESSURE: 106 MMHG | WEIGHT: 136 LBS

## 2023-04-26 DIAGNOSIS — L60.0 INGROWN TOENAIL OF RIGHT FOOT: ICD-10-CM

## 2023-04-26 DIAGNOSIS — M17.11 PRIMARY OSTEOARTHRITIS OF RIGHT KNEE: ICD-10-CM

## 2023-04-26 DIAGNOSIS — B35.1 DERMATOPHYTOSIS OF NAIL: Primary | ICD-10-CM

## 2023-04-26 PROCEDURE — 99203 OFFICE O/P NEW LOW 30 MIN: CPT | Mod: S$PBB,,, | Performed by: PODIATRIST

## 2023-04-26 PROCEDURE — 99203 PR OFFICE/OUTPT VISIT, NEW, LEVL III, 30-44 MIN: ICD-10-PCS | Mod: S$PBB,,, | Performed by: PODIATRIST

## 2023-04-26 PROCEDURE — 99214 OFFICE O/P EST MOD 30 MIN: CPT | Mod: PBBFAC,PN | Performed by: PODIATRIST

## 2023-04-26 PROCEDURE — 99999 PR PBB SHADOW E&M-EST. PATIENT-LVL IV: CPT | Mod: PBBFAC,,, | Performed by: PODIATRIST

## 2023-04-26 PROCEDURE — 99999 PR PBB SHADOW E&M-EST. PATIENT-LVL IV: ICD-10-PCS | Mod: PBBFAC,,, | Performed by: PODIATRIST

## 2023-04-26 RX ORDER — MUPIROCIN 20 MG/G
1 OINTMENT TOPICAL 2 TIMES DAILY
Status: CANCELLED | OUTPATIENT
Start: 2023-04-26 | End: 2023-05-01

## 2023-04-26 RX ORDER — CLOTRIMAZOLE AND BETAMETHASONE DIPROPIONATE 10; .64 MG/G; MG/G
CREAM TOPICAL DAILY
Qty: 45 G | Refills: 1 | Status: SHIPPED | OUTPATIENT
Start: 2023-04-26 | End: 2023-08-29

## 2023-04-26 RX ORDER — PROCHLORPERAZINE EDISYLATE 5 MG/ML
5 INJECTION INTRAMUSCULAR; INTRAVENOUS EVERY 6 HOURS PRN
Status: CANCELLED | OUTPATIENT
Start: 2023-04-26

## 2023-04-26 RX ORDER — MORPHINE SULFATE 2 MG/ML
2 INJECTION, SOLUTION INTRAMUSCULAR; INTRAVENOUS
Status: CANCELLED | OUTPATIENT
Start: 2023-04-26

## 2023-04-26 RX ORDER — PREGABALIN 75 MG/1
75 CAPSULE ORAL NIGHTLY
Status: CANCELLED | OUTPATIENT
Start: 2023-04-26

## 2023-04-26 RX ORDER — ASPIRIN 81 MG/1
81 TABLET ORAL 2 TIMES DAILY
Status: CANCELLED | OUTPATIENT
Start: 2023-04-26

## 2023-04-26 RX ORDER — FAMOTIDINE 20 MG/1
20 TABLET, FILM COATED ORAL 2 TIMES DAILY
Status: CANCELLED | OUTPATIENT
Start: 2023-04-26

## 2023-04-26 RX ORDER — MUPIROCIN 20 MG/G
1 OINTMENT TOPICAL
Status: CANCELLED | OUTPATIENT
Start: 2023-04-26

## 2023-04-26 RX ORDER — ACETAMINOPHEN 500 MG
1000 TABLET ORAL EVERY 6 HOURS
Status: CANCELLED | OUTPATIENT
Start: 2023-04-26

## 2023-04-26 RX ORDER — ONDANSETRON 2 MG/ML
4 INJECTION INTRAMUSCULAR; INTRAVENOUS EVERY 8 HOURS PRN
Status: CANCELLED | OUTPATIENT
Start: 2023-04-26

## 2023-04-26 RX ORDER — ACETAMINOPHEN 500 MG
1000 TABLET ORAL
Status: CANCELLED | OUTPATIENT
Start: 2023-04-26

## 2023-04-26 RX ORDER — CEFAZOLIN SODIUM 2 G/50ML
2 SOLUTION INTRAVENOUS
Status: CANCELLED | OUTPATIENT
Start: 2023-04-26 | End: 2023-04-27

## 2023-04-26 RX ORDER — CELECOXIB 200 MG/1
400 CAPSULE ORAL ONCE
Status: CANCELLED | OUTPATIENT
Start: 2023-04-26 | End: 2023-04-26

## 2023-04-26 RX ORDER — SODIUM CHLORIDE 9 MG/ML
INJECTION, SOLUTION INTRAVENOUS
Status: CANCELLED | OUTPATIENT
Start: 2023-04-26

## 2023-04-26 RX ORDER — SODIUM CHLORIDE 9 MG/ML
INJECTION, SOLUTION INTRAVENOUS CONTINUOUS
Status: CANCELLED | OUTPATIENT
Start: 2023-04-26 | End: 2023-04-27

## 2023-04-26 RX ORDER — PREGABALIN 75 MG/1
75 CAPSULE ORAL
Status: CANCELLED | OUTPATIENT
Start: 2023-04-26

## 2023-04-26 RX ORDER — AMOXICILLIN 250 MG
1 CAPSULE ORAL 2 TIMES DAILY
Status: CANCELLED | OUTPATIENT
Start: 2023-04-26

## 2023-04-26 RX ORDER — NALOXONE HCL 0.4 MG/ML
0.02 VIAL (ML) INJECTION
Status: CANCELLED | OUTPATIENT
Start: 2023-04-26

## 2023-04-26 RX ORDER — BISACODYL 10 MG
10 SUPPOSITORY, RECTAL RECTAL EVERY 12 HOURS PRN
Status: CANCELLED | OUTPATIENT
Start: 2023-04-26

## 2023-04-26 RX ORDER — POLYETHYLENE GLYCOL 3350 17 G/17G
17 POWDER, FOR SOLUTION ORAL DAILY
Status: CANCELLED | OUTPATIENT
Start: 2023-04-26

## 2023-04-26 RX ORDER — FENTANYL CITRATE 50 UG/ML
25 INJECTION, SOLUTION INTRAMUSCULAR; INTRAVENOUS EVERY 5 MIN PRN
Status: CANCELLED | OUTPATIENT
Start: 2023-04-26

## 2023-04-26 RX ORDER — FENTANYL CITRATE 50 UG/ML
100 INJECTION, SOLUTION INTRAMUSCULAR; INTRAVENOUS
Status: CANCELLED | OUTPATIENT
Start: 2023-04-26 | End: 2023-04-27

## 2023-04-26 RX ORDER — TALC
6 POWDER (GRAM) TOPICAL NIGHTLY PRN
Status: CANCELLED | OUTPATIENT
Start: 2023-04-26

## 2023-04-26 RX ORDER — METHOCARBAMOL 750 MG/1
750 TABLET, FILM COATED ORAL 3 TIMES DAILY
Status: CANCELLED | OUTPATIENT
Start: 2023-04-26

## 2023-04-26 RX ORDER — LIDOCAINE HYDROCHLORIDE 10 MG/ML
1 INJECTION, SOLUTION EPIDURAL; INFILTRATION; INTRACAUDAL; PERINEURAL
Status: CANCELLED | OUTPATIENT
Start: 2023-04-26

## 2023-04-26 RX ORDER — OXYCODONE HYDROCHLORIDE 5 MG/1
5 TABLET ORAL
Status: CANCELLED | OUTPATIENT
Start: 2023-04-26

## 2023-04-26 RX ORDER — SODIUM CHLORIDE 0.9 % (FLUSH) 0.9 %
10 SYRINGE (ML) INJECTION
Status: CANCELLED | OUTPATIENT
Start: 2023-04-26

## 2023-04-26 RX ORDER — MIDAZOLAM HYDROCHLORIDE 1 MG/ML
4 INJECTION INTRAMUSCULAR; INTRAVENOUS
Status: CANCELLED | OUTPATIENT
Start: 2023-04-26 | End: 2023-04-27

## 2023-04-26 RX ORDER — OXYCODONE HYDROCHLORIDE 5 MG/1
10 TABLET ORAL
Status: CANCELLED | OUTPATIENT
Start: 2023-04-26

## 2023-04-26 RX ORDER — CEFAZOLIN SODIUM 2 G/50ML
2 SOLUTION INTRAVENOUS
Status: CANCELLED | OUTPATIENT
Start: 2023-04-26

## 2023-04-26 RX ORDER — CELECOXIB 200 MG/1
200 CAPSULE ORAL DAILY
Status: CANCELLED | OUTPATIENT
Start: 2023-04-26

## 2023-04-26 NOTE — PROGRESS NOTES
CC:  Right knee pain    Gerda Lai is a 89 y.o. female with history of Right knee pain. Pain is worse with activity and weight bearing.  Patient has experienced interference of activities of daily living due to decreased range of motion and an increase in joint pain and swelling.  Patient has failed non-operative treatment including NSAIDs, corticosteroid injections, viscosupplement injections, and activity modification.  Gerda Lai currently ambulates using assistive device .     Relevant medical conditions of significance in perioperative period:  CKD stage 3 management PCP    Patient's age frail condition Given documented medical comorbidities including those listed above and based off of CMS criteria patient would meet inpatient admission status guidelines. This case has been approved as inpatient.     Past Medical History:   Diagnosis Date    Anxiety     Arthritis     Basal cell carcinoma 09/2016    right post auricular neck     Basal cell carcinoma of right forearm 04/19/2023    R lower forearm    Breast cancer 1992    right    Cataract     Fibromyalgia     History of measles as a child     Hyperlipidemia     Hypertension     Personal history of colonic polyps     Pneumonia     SCC (squamous cell carcinoma) 2015    R chest    SCC (squamous cell carcinoma) 2016    left medial shoulder    SCC (squamous cell carcinoma) 2017    left knee    SCC (squamous cell carcinoma) 2022    L upper chest, R upper arm KA types    Shingles     Squamous cell carcinoma 2015    right forearm    Thyroid disease     Vaginitis        Past Surgical History:   Procedure Laterality Date    ADENOIDECTOMY      BREAST BIOPSY Left     Excisional bx, benign    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    CARPAL TUNNEL RELEASE Right 3/16/2021    Procedure: RELEASE, CARPAL TUNNEL;  Surgeon: Barbara Aldridge MD;  Location: [facility];  Service: Orthopedics;  Laterality: Right;    CATARACT EXTRACTION W/  INTRAOCULAR LENS IMPLANT Bilateral      EPIDURAL STEROID INJECTION N/A 2/24/2023    Procedure: HEIDI C7-T1;  Surgeon: Andi Cisneros DO;  Location: [facility] OR;  Service: Pain Management;  Laterality: N/A;    EYE SURGERY      HAND SURGERY      INJECTION OF ANESTHETIC AGENT AROUND MEDIAL BRANCH NERVES INNERVATING CERVICAL FACET JOINT N/A 1/4/2022    Procedure: Cervical Medial Branch Block C5-6, C6-7 (No Sedation);  Surgeon: Himanshu Blackmon Jr., MD;  Location: [facility] PAIN MGT;  Service: Pain Management;  Laterality: N/A;    INJECTION OF ANESTHETIC AGENT AROUND MEDIAL BRANCH NERVES INNERVATING LUMBAR FACET JOINT Left 11/18/2021    Procedure: Cervical Medial Branch Block Left C5-6, C6-7 (IV Sedation);  Surgeon: Himanshu Blackmon Jr., MD;  Location: [facility] PAIN MGT;  Service: Pain Management;  Laterality: Left;    JOINT REPLACEMENT Right     ELZBIETA    KNEE ARTHROPLASTY Left 8/10/2020    Procedure: ARTHROPLASTY, KNEE-ADE NEXGEN/CEMENTED TIBIA;  Surgeon: Kalin Pacheco MD;  Location: [facility] OR;  Service: Orthopedics;  Laterality: Left;    RADIOFREQUENCY THERMAL COAGULATION OF MEDIAL BRANCH OF POSTERIOR RAMUS OF CERVICAL SPINAL NERVE Left 3/8/2022    Procedure: RADIOFREQUENCY THERMAL COAGULATION, NERVE, SPINAL, CERVICAL, LEFT C5-6 AND C6-7;  Surgeon: Himanshu Blackmon Jr., MD;  Location: [facility] PAIN MGT;  Service: Pain Management;  Laterality: Left;  NO PACEMAKER   ASA    thyriodectomy      partial    tonsillectomy      TONSILLECTOMY      TOTAL HIP ARTHROPLASTY      right    Yag  Left        Family History   Problem Relation Age of Onset    Breast cancer Mother     Cancer Mother     Stroke Paternal Grandfather     Heart disease Father     Cancer Paternal Aunt     Heart disease Paternal Aunt     Glaucoma Paternal Grandmother     Amblyopia Neg Hx     Blindness Neg Hx     Cataracts Neg Hx     Diabetes Neg Hx     Hypertension Neg Hx     Macular degeneration Neg Hx     Retinal detachment Neg Hx     Strabismus Neg Hx     Thyroid disease Neg Hx     Melanoma Neg Hx         Review of patient's allergies indicates:   Allergen Reactions    Sulfa (sulfonamide antibiotics) Rash    Clarithromycin Other (See Comments)     Weak, extreme fatigue. dizziness    Flexeril [cyclobenzaprine] Other (See Comments)     Dizziness    Iodine and iodide containing products     Lisinopril Other (See Comments)     Cough and sensation of throat swelling/?angioedema    Losartan Rash    Metoprolol Swelling     Tightness in throat    Spironolactone      Throat tightening    Tramadol Other (See Comments)     Dizzy and weak    Verapamil (bulk) Palpitations    Voltaren [diclofenac sodium] Other (See Comments)     Drops blood pressure         Current Outpatient Medications:     acetaminophen (TYLENOL) 650 MG TbSR, Take 1 tablet (650 mg total) by mouth every 8 (eight) hours as needed., Disp: 120 tablet, Rfl: 0    albuterol (PROVENTIL/VENTOLIN HFA) 90 mcg/actuation inhaler, INHALE 1 TO 2 PUFFS BY MOUTH EVERY 6 HOURS AS NEEDED FOR WHEEZE, Disp: 8.5 g, Rfl: 5    aliskiren (TEKTURNA) 300 MG Tab, TAKE 1 TABLET BY MOUTH EVERY DAY, Disp: 90 tablet, Rfl: 2    aspirin (ECOTRIN) 81 MG EC tablet, TAKE 1 TABLET BY MOUTH EVERY DAY, Disp: 90 tablet, Rfl: 3    coenzyme Q10 100 mg capsule, Take 100 mg by mouth once daily., Disp: , Rfl:     felodipine (PLENDIL) 5 MG 24 hr tablet, Take 1 tablet (5 mg total) by mouth once daily., Disp: 90 tablet, Rfl: 3    glucosamine-chondroitin 500-400 mg tablet, Take 1 tablet by mouth once daily. , Disp: , Rfl:     labetaloL (NORMODYNE) 100 MG tablet, Take 100 mg by mouth 2 (two) times daily., Disp: , Rfl:     lactase (LACTAID) 3,000 unit tablet, Take 1 tablet by mouth 3 (three) times daily as needed., Disp: , Rfl:     LORazepam (ATIVAN) 0.5 MG tablet, TAKE 1/2 TABLET TO 1 TABLET BY MOUTH EVERY 12 HOURS AS NEEDED FOR ANXIETY, Disp: 30 tablet, Rfl: 0    multivitamin capsule, Take 1 capsule by mouth once daily., Disp: , Rfl:     mupirocin (BACTROBAN) 2 % ointment, AAA bid (Patient not taking:  Reported on 4/25/2023), Disp: 30 g, Rfl: 3    omeprazole (PRILOSEC OTC) 20 MG tablet, Take 20 mg by mouth once daily., Disp: , Rfl:     polymyxin B sulf-trimethoprim (POLYTRIM) 10,000 unit- 1 mg/mL Drop, Place 1 drop into both eyes every 6 (six) hours., Disp: 10 mL, Rfl: 0    pravastatin (PRAVACHOL) 80 MG tablet, Take 1 tablet (80 mg total) by mouth once daily., Disp: 90 tablet, Rfl: 3    spironolactone (ALDACTONE) 25 MG tablet, Take 1 tablet (25 mg total) by mouth 3 (three) times a week., Disp: 30 tablet, Rfl: 11    TURMERIC ORAL, Take 500 mg by mouth once daily. , Disp: , Rfl:     vitamin D (VITAMIN D3) 1000 units Tab, Take 1,000 Units by mouth once daily., Disp: , Rfl:   No current facility-administered medications for this visit.    Facility-Administered Medications Ordered in Other Visits:     fentaNYL 50 mcg/mL injection 25 mcg, 25 mcg, Intravenous, Once PRN, Andi Cisneros DO    midazolam (VERSED) 1 mg/mL injection 2 mg, 2 mg, Intravenous, Once PRN, Andi Cisneros DO    Review of Systems:  Constitutional: no fever or chills  Eyes: no visual changes  ENT: no nasal congestion or sore throat  Respiratory: no cough or shortness of breath  Cardiovascular: no chest pain or palpitations  Gastrointestinal: no nausea or vomiting, tolerating diet  Genitourinary: no hematuria or dysuria  Integument/Breast: no rash or pruritis  Hematologic/Lymphatic: no easy bruising or lymphadenopathy  Musculoskeletal: positive for knee pain  Neurological: no seizures or tremors  Behavioral/Psych: no auditory or visual hallucinations  Endocrine: no heat or cold intolerance    PE:  There were no vitals taken for this visit.  General: Pleasant, cooperative, NAD   Gait: antalgic  HEENT: NCAT, sclera nonicteric   Lungs: Respirations clear bilaterally; equal and unlabored.   CV: S1S2; 2+ bilateral upper and lower extremity pulses.   Skin: Intact throughout with no rashes, erythema, or lesions  Extremities: No LE edema,  no  erythema or warmth of the skin in either lower extremity.    Right knee exam:  Knee Range of Motion:0-120 degrees flexion, pain with passive range of motion  Effusion:none  Condition of skin:intact  Location of tenderness:Medial joint line and Lateral joint line   Strength:limited by pain and 5 of 5  Stability: Lachman: stable, LCL: stable, MCL: stable, PCL: stable, and posteromedial (dial): stable    Hip Examination: painless PROM of hip     Radiographs: Radiographs reveal advanced degenerative changes including subchondral cyst formation, subchondral sclerosis, osteophyte formation, joint space narrowing.     Knee Alignment:  Significiant valgus    Diagnosis: Primary osteoarthritis Right knee    Plan: Right total knee arthroplasty    Due to the serious nature of total joint infection and the high prevalence of community acquired MRSA, vancomycin will be used perioperatively.

## 2023-04-26 NOTE — PROGRESS NOTES
PODIATRY NOTE     PATIENT ID:  Gerda Lai is a 89 y.o. female.     CHIEF CONCERN:   Ingrown Toenail (Right Great Toenail)           MEDICAL DECISION MAKING:        ICD-10-CM ICD-9-CM    1. Dermatophytosis of nail  B35.1 110.1 clotrimazole-betamethasone 1-0.05% (LOTRISONE) cream      2. Primary osteoarthritis of right knee  M17.11 715.16 Ambulatory referral/consult to Podiatry      3. Ingrown toenail of right foot  L60.0 703.0 Ambulatory referral/consult to Podiatry          I counseled the patient on the patient's conditions, their implications and medical management.    Appears more fungal, no paronychia.  No pain and no drainage.      Meds as ordered.   Shoe and activity modification as needed for relief.    General nail care measures for abnormal nails include:  Keeping nails trimmed short, but avoid overzealous trimming  Avoiding trauma   Avoiding contact irritants   Keeping nails dry (avoiding wet work)  Avoiding all nail cosmetics  Wearing shoes that fit well at the toe box.  Avoid tight shoes.                 HISTORY OF PRESENT ILLNESS:  Gerda Lai is a 89 y.o. female with concerns regarding: Ingrown Toenail (Right Great Toenail)  Patient reports symptoms/signs have been present a few days, was more red but now improving.  She does not recall acute trauma to the area.       Patient Active Problem List   Diagnosis    Personal history of breast cancer    Hypertension, essential    HLD (hyperlipidemia)    CKD (chronic kidney disease), stage III    Hearing loss, sensorineural    Primary osteoarthritis of right knee    Cervical spondylosis    Personal history of skin cancer    Osteoarthritis    Lumbosacral radiculopathy at L5    History of total right hip replacement    Other spondylosis with radiculopathy, lumbar region    Aortic atherosclerosis    Osteopenia    Subclavian artery stenosis, right    Abnormal aldosterone/renin ratio    Anxiety    Carotid arterial disease    Macrocytic anemia    Primary  osteoarthritis of left knee s/p TKA 8/10/20    Impaired functional mobility, balance, gait, and endurance    Risk for falls    Frail elderly    Impaired functional mobility, balance, and endurance    Right foot pain    Acute pain of right lower extremity    Weakness of both lower extremities    Lower extremity edema           Current Outpatient Medications on File Prior to Visit   Medication Sig Dispense Refill    acetaminophen (TYLENOL) 650 MG TbSR Take 1 tablet (650 mg total) by mouth every 8 (eight) hours as needed. 120 tablet 0    albuterol (PROVENTIL/VENTOLIN HFA) 90 mcg/actuation inhaler INHALE 1 TO 2 PUFFS BY MOUTH EVERY 6 HOURS AS NEEDED FOR WHEEZE 8.5 g 5    aliskiren (TEKTURNA) 300 MG Tab TAKE 1 TABLET BY MOUTH EVERY DAY 90 tablet 2    aspirin (ECOTRIN) 81 MG EC tablet TAKE 1 TABLET BY MOUTH EVERY DAY 90 tablet 3    coenzyme Q10 100 mg capsule Take 100 mg by mouth once daily.      felodipine (PLENDIL) 5 MG 24 hr tablet Take 1 tablet (5 mg total) by mouth once daily. 90 tablet 3    glucosamine-chondroitin 500-400 mg tablet Take 1 tablet by mouth once daily.       labetaloL (NORMODYNE) 100 MG tablet Take 100 mg by mouth 2 (two) times daily.      lactase (LACTAID) 3,000 unit tablet Take 1 tablet by mouth 3 (three) times daily as needed.      LORazepam (ATIVAN) 0.5 MG tablet TAKE 1/2 TABLET TO 1 TABLET BY MOUTH EVERY 12 HOURS AS NEEDED FOR ANXIETY 30 tablet 0    multivitamin capsule Take 1 capsule by mouth once daily.      mupirocin (BACTROBAN) 2 % ointment AAA bid 30 g 3    omeprazole (PRILOSEC OTC) 20 MG tablet Take 20 mg by mouth once daily.      polymyxin B sulf-trimethoprim (POLYTRIM) 10,000 unit- 1 mg/mL Drop Place 1 drop into both eyes every 6 (six) hours. 10 mL 0    pravastatin (PRAVACHOL) 80 MG tablet Take 1 tablet (80 mg total) by mouth once daily. 90 tablet 3    spironolactone (ALDACTONE) 25 MG tablet Take 1 tablet (25 mg total) by mouth 3 (three) times a week. 30 tablet 11    TURMERIC ORAL Take 500  "mg by mouth once daily.       vitamin D (VITAMIN D3) 1000 units Tab Take 1,000 Units by mouth once daily.       Current Facility-Administered Medications on File Prior to Visit   Medication Dose Route Frequency Provider Last Rate Last Admin    fentaNYL 50 mcg/mL injection 25 mcg  25 mcg Intravenous Once PRN Andi Cisneros DO        midazolam (VERSED) 1 mg/mL injection 2 mg  2 mg Intravenous Once PRN Andi Cisneros DO               Review of patient's allergies indicates:   Allergen Reactions    Sulfa (sulfonamide antibiotics) Rash    Clarithromycin Other (See Comments)     Weak, extreme fatigue. dizziness    Flexeril [cyclobenzaprine] Other (See Comments)     Dizziness    Iodine and iodide containing products     Lisinopril Other (See Comments)     Cough and sensation of throat swelling/?angioedema    Losartan Rash    Metoprolol Swelling     Tightness in throat    Spironolactone      Throat tightening    Tramadol Other (See Comments)     Dizzy and weak    Verapamil (bulk) Palpitations    Voltaren [diclofenac sodium] Other (See Comments)     Drops blood pressure               ROS:   General ROS: negative for - chills, fever or night sweats  Respiratory ROS: no cough, shortness of breath, or wheezing  Cardiovascular ROS: no chest pain or dyspnea on exertion  Musculoskeletal ROS: negative for - muscle pain and muscular weakness  Neurological ROS: negative for - impaired coordination/balance and numbness/tingling  Dermatological ROS: negative for pruritus and rash      EXAM:     Vitals:    04/26/23 1548   BP: (!) 106/51   Pulse: 64   Weight: 61.7 kg (136 lb)   Height: 5' 2" (1.575 m)        General:  Alert and Oriented x 3;  No acute distress      Bilateral  Lower extremity exam:    Vascular:   Dorsalis Pedis:  present   Posterior Tibial:  present  Capillary refill time:  3 seconds  Temperature of toes cool to touch  Edema:  Trace       Neurological:     Sharp touch:  normal  Light touch: normal  Tinels " Sign:  Absent  Mulders Click:   Absent        Dermatological:   Skin: thin and atrophic  Wounds/Ulcers:  Absent  Bruising:  Absent  Erythema:  Absent  Thickened yellow toenail, right hallux, mainly lateral border with subungual debris .  Appears fungal.  No redness and no drainage.  No tenderness to palpation       Musculoskeletal:   Metatarsophalangeal range of motion:   diminished range of motion  Subtalar joint range of motion: diminished range of motion  Ankle joint range of motion:  full range of motion  Bunions:  Absent  Hammertoes: abductovarus rotation of the right hallux

## 2023-04-26 NOTE — ASSESSMENT & PLAN NOTE
Current labs:  Hgb-  11.3    Hct-  35.3  B12 and folate last checked in 2020- normal    No alcohol use  Recommend monitoring during perioperative period.

## 2023-04-26 NOTE — PROGRESS NOTES
Gerda Lai is a 89 y.o. year old here today for pre surgery optimization visit  in preparation for a Right total knee arthroplasty to be performed by Dr. Cobian  on 05/22/2023.  she was last seen and treated in the clinic on 4/25/2023. she will be medically optimized by the pre op center. There has been no significant change in medical status since last visit. No fever, chills, malaise, or unexplained weight change.      Allergies, Medications, past medical and surgical history reviewed.    Focused examination performed.    Patient saw surgeon in clinic today. All questions answered. Patient encouraged to call with questions. Contact information given.     Pre, janel, and post operative procedures and expectations discussed. Goals of successful surgery reviewed and include manageable pain levels, surgical site free of infection, medication management, and ambulation with PT/OT assistance. Healthy weight management discussed with patient and caregiver who were receptive to eduction of healthy diet and activity. No other necessary lifestyle changes identified. Educated patient about signs and symptoms of infection, medication management, anticoagulation therapy, risk of tobacco and alcohol use, and self-care to promote healing. Surgical guide given for future reference. Hibiclens given to patient with instructions. All questions were answered.     Gerda Lai verbalized an understanding to the education and goals. Patient has displayed readiness to engage in care and is ready to proceed with surgery.    Surgical and blood consents signed.    Gerda Lai will contact us if there are any questions, concerns, or changes in medical status prior to surgery.       Joint class: 2020    Patient has discussed discharge planning with surgeon. Patient will be discharged to home following surgery.   patient will be admitted to skilled nursing.     30 minutes of time was spent on patient education, review of records,  templating, H&P, , appointment scheduling and optimizing patient for surgery.

## 2023-05-01 ENCOUNTER — LAB VISIT (OUTPATIENT)
Dept: LAB | Facility: HOSPITAL | Age: 88
End: 2023-05-01
Attending: INTERNAL MEDICINE
Payer: MEDICARE

## 2023-05-01 DIAGNOSIS — I10 HYPERTENSION, ESSENTIAL: Chronic | ICD-10-CM

## 2023-05-01 LAB
ANION GAP SERPL CALC-SCNC: 9 MMOL/L (ref 8–16)
BUN SERPL-MCNC: 38 MG/DL (ref 8–23)
CALCIUM SERPL-MCNC: 9.8 MG/DL (ref 8.7–10.5)
CHLORIDE SERPL-SCNC: 107 MMOL/L (ref 95–110)
CO2 SERPL-SCNC: 25 MMOL/L (ref 23–29)
CREAT SERPL-MCNC: 1.1 MG/DL (ref 0.5–1.4)
EST. GFR  (NO RACE VARIABLE): 47.7 ML/MIN/1.73 M^2
GLUCOSE SERPL-MCNC: 89 MG/DL (ref 70–110)
POTASSIUM SERPL-SCNC: 4.7 MMOL/L (ref 3.5–5.1)
SODIUM SERPL-SCNC: 141 MMOL/L (ref 136–145)

## 2023-05-01 PROCEDURE — 36415 COLL VENOUS BLD VENIPUNCTURE: CPT | Mod: PO | Performed by: INTERNAL MEDICINE

## 2023-05-01 PROCEDURE — 80048 BASIC METABOLIC PNL TOTAL CA: CPT | Performed by: INTERNAL MEDICINE

## 2023-05-02 ENCOUNTER — OFFICE VISIT (OUTPATIENT)
Dept: INTERNAL MEDICINE | Facility: CLINIC | Age: 88
End: 2023-05-02
Payer: MEDICARE

## 2023-05-02 ENCOUNTER — LAB VISIT (OUTPATIENT)
Dept: LAB | Facility: HOSPITAL | Age: 88
End: 2023-05-02
Attending: INTERNAL MEDICINE
Payer: MEDICARE

## 2023-05-02 VITALS
DIASTOLIC BLOOD PRESSURE: 60 MMHG | BODY MASS INDEX: 25.23 KG/M2 | WEIGHT: 137.13 LBS | TEMPERATURE: 97 F | SYSTOLIC BLOOD PRESSURE: 130 MMHG | HEIGHT: 62 IN | OXYGEN SATURATION: 97 % | HEART RATE: 59 BPM

## 2023-05-02 DIAGNOSIS — I10 HYPERTENSION, ESSENTIAL: Chronic | ICD-10-CM

## 2023-05-02 DIAGNOSIS — N18.32 STAGE 3B CHRONIC KIDNEY DISEASE: Chronic | ICD-10-CM

## 2023-05-02 DIAGNOSIS — I70.0 AORTIC ATHEROSCLEROSIS: Chronic | ICD-10-CM

## 2023-05-02 DIAGNOSIS — L65.9 ALOPECIA: ICD-10-CM

## 2023-05-02 DIAGNOSIS — I10 HYPERTENSION, ESSENTIAL: Primary | Chronic | ICD-10-CM

## 2023-05-02 DIAGNOSIS — E78.5 HYPERLIPIDEMIA, UNSPECIFIED HYPERLIPIDEMIA TYPE: Chronic | ICD-10-CM

## 2023-05-02 LAB
ALBUMIN SERPL BCP-MCNC: 4.2 G/DL (ref 3.5–5.2)
ALP SERPL-CCNC: 75 U/L (ref 55–135)
ALT SERPL W/O P-5'-P-CCNC: 15 U/L (ref 10–44)
ANION GAP SERPL CALC-SCNC: 9 MMOL/L (ref 8–16)
AST SERPL-CCNC: 21 U/L (ref 10–40)
BILIRUB SERPL-MCNC: 1 MG/DL (ref 0.1–1)
BUN SERPL-MCNC: 39 MG/DL (ref 8–23)
CALCIUM SERPL-MCNC: 9.9 MG/DL (ref 8.7–10.5)
CHLORIDE SERPL-SCNC: 104 MMOL/L (ref 95–110)
CO2 SERPL-SCNC: 26 MMOL/L (ref 23–29)
CREAT SERPL-MCNC: 1.2 MG/DL (ref 0.5–1.4)
EST. GFR  (NO RACE VARIABLE): 43 ML/MIN/1.73 M^2
GLUCOSE SERPL-MCNC: 86 MG/DL (ref 70–110)
POTASSIUM SERPL-SCNC: 5.2 MMOL/L (ref 3.5–5.1)
PROT SERPL-MCNC: 7.2 G/DL (ref 6–8.4)
SODIUM SERPL-SCNC: 139 MMOL/L (ref 136–145)

## 2023-05-02 PROCEDURE — 80053 COMPREHEN METABOLIC PANEL: CPT | Performed by: INTERNAL MEDICINE

## 2023-05-02 PROCEDURE — 99215 OFFICE O/P EST HI 40 MIN: CPT | Mod: PBBFAC,PO | Performed by: INTERNAL MEDICINE

## 2023-05-02 PROCEDURE — 99999 PR PBB SHADOW E&M-EST. PATIENT-LVL V: CPT | Mod: PBBFAC,,, | Performed by: INTERNAL MEDICINE

## 2023-05-02 PROCEDURE — 99214 PR OFFICE/OUTPT VISIT, EST, LEVL IV, 30-39 MIN: ICD-10-PCS | Mod: S$PBB,,, | Performed by: INTERNAL MEDICINE

## 2023-05-02 PROCEDURE — 36415 COLL VENOUS BLD VENIPUNCTURE: CPT | Mod: PO | Performed by: INTERNAL MEDICINE

## 2023-05-02 PROCEDURE — 99999 PR PBB SHADOW E&M-EST. PATIENT-LVL V: ICD-10-PCS | Mod: PBBFAC,,, | Performed by: INTERNAL MEDICINE

## 2023-05-02 PROCEDURE — 99214 OFFICE O/P EST MOD 30 MIN: CPT | Mod: S$PBB,,, | Performed by: INTERNAL MEDICINE

## 2023-05-02 NOTE — PROGRESS NOTES
Subjective:       Patient ID: Gerda Lai is a 89 y.o. female.    Chief Complaint: Follow-up and Rx bp change    HPI    89-year-old female here for follow-up.  She recently had swelling in her calves and feet.  She was told the swelling was the felodipine.  She had a bad reaction when she ate out at a restaurant with swelling.  She was taking felodipine 10 mg.  She had this decreased to 5 mg.  She is not swelling as much with the reduced dose.    Her hair has been falling out over the last several weeks.  She has not had any recent infections.  She has had more stress than usual.  It is not easy to accept some of this.  She forgot her telephone in her brother's car who lives in [place] (he is 91 yo).  She has not had more stress over the last 3-4 months.    HTN - Patient is currently on Plendil 5 mg, labetalol 100 mg twice daily, Aldactone 25 mg. She does occasionally check her BP at home, and it runs no too bad. Side effects of medications note: none. Denies headaches, blurred vision, chest pain, shortness of breath, nausea.    HLD - Patient is currently on pravastatin 80 mg.  She last lipid panel was   Cholesterol   Date Value Ref Range Status   03/22/2023 171 120 - 199 mg/dL Final     Comment:     The National Cholesterol Education Program (NCEP) has set the  following guidelines (reference ranges) for Cholesterol:  Optimal.....................<200 mg/dL  Borderline High.............200-239 mg/dL  High........................> or = 240 mg/dL       Triglycerides   Date Value Ref Range Status   03/22/2023 163 (H) 30 - 150 mg/dL Final     Comment:     The National Cholesterol Education Program (NCEP) has set the  following guidelines (reference values) for triglycerides:  Normal......................<150 mg/dL  Borderline High.............150-199 mg/dL  High........................200-499 mg/dL       HDL   Date Value Ref Range Status   03/22/2023 54 40 - 75 mg/dL Final     Comment:     The National Cholesterol  Education Program (NCEP) has set the  following guidelines (reference values) for HDL Cholesterol:  Low...............<40 mg/dL  Optimal...........>60 mg/dL       LDL Cholesterol   Date Value Ref Range Status   03/22/2023 84.4 63.0 - 159.0 mg/dL Final     Comment:     The National Cholesterol Education Program (NCEP) has set the  following guidelines (reference values) for LDL Cholesterol:  Optimal.......................<130 mg/dL  Borderline High...............130-159 mg/dL  High..........................160-189 mg/dL  Very High.....................>190 mg/dL     .  Side effects of the medication: none.    Review of Systems      Objective:      Physical Exam  Vitals reviewed.   Constitutional:       Appearance: She is well-developed.   HENT:      Head: Normocephalic and atraumatic.      Mouth/Throat:      Pharynx: No oropharyngeal exudate.   Eyes:      General: No scleral icterus.        Right eye: No discharge.         Left eye: No discharge.      Pupils: Pupils are equal, round, and reactive to light.   Neck:      Thyroid: No thyromegaly.      Trachea: No tracheal deviation.   Cardiovascular:      Rate and Rhythm: Normal rate and regular rhythm.      Heart sounds: Normal heart sounds. No murmur heard.    No friction rub. No gallop.   Pulmonary:      Effort: Pulmonary effort is normal. No respiratory distress.      Breath sounds: Normal breath sounds. No wheezing or rales.   Chest:      Chest wall: No tenderness.   Abdominal:      General: Bowel sounds are normal. There is no distension.      Palpations: Abdomen is soft. There is no mass.      Tenderness: There is no abdominal tenderness. There is no guarding or rebound.   Musculoskeletal:         General: No tenderness. Normal range of motion.      Cervical back: Normal range of motion and neck supple.   Skin:     General: Skin is warm and dry.      Coloration: Skin is not pale.      Findings: No erythema or rash.   Neurological:      Mental Status: She is alert  and oriented to person, place, and time.   Psychiatric:         Behavior: Behavior normal.       Assessment:       1. Hypertension, essential  - Comprehensive Metabolic Panel; Future    2. Hyperlipidemia, unspecified hyperlipidemia type    3. Aortic atherosclerosis    4. Stage 3b chronic kidney disease    5. Alopecia  - Ambulatory referral/consult to Dermatology; Future      Plan:       1. Continue Plendil 5 mg, Aldactone 25 mg, labetalol 100 mg b.i.d..  Think some of the swelling is from the Plendil, but given reduced dosage, the swelling has improved significantly.  Check CMP.  2/3.  Continue pravastatin 80 mg.  4.  Monitor  5.  Refer to Dermatology.

## 2023-05-03 ENCOUNTER — TELEPHONE (OUTPATIENT)
Dept: CARDIOLOGY | Facility: CLINIC | Age: 88
End: 2023-05-03
Payer: MEDICARE

## 2023-05-03 ENCOUNTER — OFFICE VISIT (OUTPATIENT)
Dept: DERMATOLOGY | Facility: CLINIC | Age: 88
End: 2023-05-03
Payer: MEDICARE

## 2023-05-03 DIAGNOSIS — C44.622 SQUAMOUS CELL CARCINOMA OF RIGHT UPPER EXTREMITY: ICD-10-CM

## 2023-05-03 DIAGNOSIS — Z09 POSTOP CHECK: Primary | ICD-10-CM

## 2023-05-03 PROCEDURE — 87186 SC STD MICRODIL/AGAR DIL: CPT | Performed by: DERMATOLOGY

## 2023-05-03 PROCEDURE — 87070 CULTURE OTHR SPECIMN AEROBIC: CPT | Performed by: DERMATOLOGY

## 2023-05-03 PROCEDURE — 87077 CULTURE AEROBIC IDENTIFY: CPT | Performed by: DERMATOLOGY

## 2023-05-03 PROCEDURE — 99213 OFFICE O/P EST LOW 20 MIN: CPT | Mod: PBBFAC | Performed by: DERMATOLOGY

## 2023-05-03 PROCEDURE — 99024 PR POST-OP FOLLOW-UP VISIT: ICD-10-PCS | Mod: POP,,, | Performed by: DERMATOLOGY

## 2023-05-03 PROCEDURE — 99024 POSTOP FOLLOW-UP VISIT: CPT | Mod: POP,,, | Performed by: DERMATOLOGY

## 2023-05-03 PROCEDURE — 99999 PR PBB SHADOW E&M-EST. PATIENT-LVL III: CPT | Mod: PBBFAC,,, | Performed by: DERMATOLOGY

## 2023-05-03 PROCEDURE — 99999 PR PBB SHADOW E&M-EST. PATIENT-LVL III: ICD-10-PCS | Mod: PBBFAC,,, | Performed by: DERMATOLOGY

## 2023-05-03 RX ORDER — MUPIROCIN 20 MG/G
OINTMENT TOPICAL 3 TIMES DAILY
Qty: 15 G | Refills: 1 | Status: ON HOLD | OUTPATIENT
Start: 2023-05-03 | End: 2023-06-05 | Stop reason: HOSPADM

## 2023-05-03 RX ORDER — DOXYCYCLINE HYCLATE 100 MG
100 TABLET ORAL EVERY 12 HOURS
Qty: 28 TABLET | Refills: 0 | Status: SHIPPED | OUTPATIENT
Start: 2023-05-03 | End: 2023-05-17

## 2023-05-03 NOTE — PROGRESS NOTES
89 y.o. female patient is here for suture removal following Mohs' surgery.    Patient reports no problems.    WOUND PE:  The right lower forearm sutures intact. Wound healing well. (+) erythema, edema at distal aspect of incision, with some yellow white drainage.  No abscess formation.  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     PLAN:  Sutures removed today by  Adela Restrepo MA . Steri-strips applied.  Bacterial culture today.  Doxycycline 100 mg bid x 10 days for now pending culture results, disc photosensitiviy, GI upset  Mupirocin ointment bid inside each nostril x 7 days, louis in light of upcoming knee surgery.   Warm wet compresses tid  Pt expressed concern for C dif - disc given pain today, it is best to take orals, low risk and do probiotics.   Advised to take probiotics 2-3 x per day and increase yogurt intake.   Call if worsens.     NOTE: per chart review, pt had possible wound infection shortly after biopsy, was prescribed keflex orally and topical mupirocin by Dr. Curtis.  Pt did not take Keflex out of concern for C dif. And only did topical mupirocin.      RTC:  In 2 weeks.

## 2023-05-03 NOTE — TELEPHONE ENCOUNTER
----- Message from Amy Blanco LPN sent at 5/3/2023  9:14 AM CDT -----  Regarding: Cardiac clearance  Hi good morning,     's BUN came back elevated. Will you be able to give her cardiac clearance from the chart or will she need an appointment with you to be cleared? Please advise.     Thank you,     DIANN Reyes  Pre op/Anesthesia

## 2023-05-04 ENCOUNTER — HOSPITAL ENCOUNTER (OUTPATIENT)
Dept: RADIOLOGY | Facility: HOSPITAL | Age: 88
Discharge: HOME | End: 2023-05-04
Attending: INTERNAL MEDICINE
Payer: MEDICARE

## 2023-05-04 ENCOUNTER — PATIENT MESSAGE (OUTPATIENT)
Dept: DERMATOLOGY | Facility: CLINIC | Age: 88
End: 2023-05-04
Payer: MEDICARE

## 2023-05-04 VITALS — HEIGHT: 62 IN | BODY MASS INDEX: 25.21 KG/M2 | WEIGHT: 137 LBS

## 2023-05-04 DIAGNOSIS — Z12.31 SCREENING MAMMOGRAM FOR BREAST CANCER: ICD-10-CM

## 2023-05-04 PROCEDURE — 77063 BREAST TOMOSYNTHESIS BI: CPT | Mod: 26,,, | Performed by: RADIOLOGY

## 2023-05-04 PROCEDURE — 77067 MAMMO DIGITAL SCREENING BILAT WITH TOMO: ICD-10-PCS | Mod: 26,,, | Performed by: RADIOLOGY

## 2023-05-04 PROCEDURE — 77063 MAMMO DIGITAL SCREENING BILAT WITH TOMO: ICD-10-PCS | Mod: 26,,, | Performed by: RADIOLOGY

## 2023-05-04 PROCEDURE — 77067 SCR MAMMO BI INCL CAD: CPT | Mod: 26,,, | Performed by: RADIOLOGY

## 2023-05-04 PROCEDURE — 77067 SCR MAMMO BI INCL CAD: CPT | Mod: TC,PO

## 2023-05-05 ENCOUNTER — TELEPHONE (OUTPATIENT)
Dept: DERMATOLOGY | Facility: CLINIC | Age: 88
End: 2023-05-05
Payer: MEDICARE

## 2023-05-05 LAB — BACTERIA SPEC AEROBE CULT: ABNORMAL

## 2023-05-05 NOTE — TELEPHONE ENCOUNTER
----- Message from Leigh Taylor sent at 5/5/2023  2:53 PM CDT -----  Regarding: Returning a missed call  Contact: Pt  Pt is calling back from a missed call.   Pt. Would like a call back.          Confirmed contact below:   Contact Name:Gerda Lai  Phone Number: [phone]

## 2023-05-05 NOTE — TELEPHONE ENCOUNTER
LM for pt that culture from R upper arm 5/3 came back as methicillin-resistant Staph A. Informed that pt should continue taking doxycycline and complete course and that pt should continue mupirocin swabs to nose only. Left direct line for pt to call back with questions.

## 2023-05-05 NOTE — TELEPHONE ENCOUNTER
Informed pt that she should continue taking doxy for full course to treat MRSA of R upper arm incision & should also continue applying mupirocin to nostril area. Answered questions about antibiotic frequency & warm compresses. Pt expressed understanding & thanked me.

## 2023-05-06 NOTE — TELEPHONE ENCOUNTER
Reviewed labs. She is cleared for knee surgery and anesthesia.  Homeyar Dinshaw [facility]: [phone]

## 2023-05-08 ENCOUNTER — PATIENT MESSAGE (OUTPATIENT)
Dept: ORTHOPEDICS | Facility: CLINIC | Age: 88
End: 2023-05-08
Payer: MEDICARE

## 2023-05-09 ENCOUNTER — TELEPHONE (OUTPATIENT)
Dept: INTERNAL MEDICINE | Facility: CLINIC | Age: 88
End: 2023-05-09
Payer: MEDICARE

## 2023-05-09 ENCOUNTER — TELEPHONE (OUTPATIENT)
Dept: ORTHOPEDICS | Facility: CLINIC | Age: 88
End: 2023-05-09
Payer: MEDICARE

## 2023-05-09 NOTE — TELEPHONE ENCOUNTER
I called the patient today regarding her voice message. I left a message for the patient to call me back. I left my name and phone number.         ----- Message from Leonidas Sotelo MA sent at 5/8/2023  4:32 PM CDT -----  Contact: [phone]    ----- Message -----  From: Betsy Luong  Sent: 5/8/2023   2:59 PM CDT  To: [facility] Staff    Gerda Lai calling regarding Patient Advice (message) for #Pt is requesting a call back from Isabella

## 2023-05-09 NOTE — TELEPHONE ENCOUNTER
----- Message from Britt Reina sent at 5/9/2023  2:20 PM CDT -----  Contact: [phone] Patient  Calling to get test results.   Name of test (lab, x-ray): mammogram  Date of test: 05/04/23  Where was the test performed: [facility]  Would you like a call back, or a response through your [facility] portal?:    pt portal  Comments:

## 2023-05-09 NOTE — TELEPHONE ENCOUNTER
I called the patient today regarding her surgery. The patient wants to keep her surgery date the same. She said that she has a skin biopsy about a week ago and it is still healing. The patient verbalized understanding and has no further questions.           ----- Message from Leonidas Sotelo MA sent at 5/9/2023  8:33 AM CDT -----  Regarding: FW: Missed call  Contact: [phone]  Dudley Long,    I see that you called the patient this morning.      Sincerely,   Leonidas Sotelo   Medical Assistant   Phone: [phone]  Fax: [phone]    ----- Message -----  From: Reji Parr  Sent: 5/9/2023   8:20 AM CDT  To: [facility] Staff  Subject: Missed call                                      Calling in regards to a missed call. Please call pt to discuss

## 2023-05-09 NOTE — TELEPHONE ENCOUNTER
Notified via phone that results not in yet for mammogram. Assured pt that Dr Beltran will respond and results will go to her [facility] acct as soon as available

## 2023-05-12 ENCOUNTER — PATIENT MESSAGE (OUTPATIENT)
Dept: INTERNAL MEDICINE | Facility: CLINIC | Age: 88
End: 2023-05-12
Payer: MEDICARE

## 2023-05-12 ENCOUNTER — TELEPHONE (OUTPATIENT)
Dept: PREADMISSION TESTING | Facility: HOSPITAL | Age: 88
End: 2023-05-12
Payer: MEDICARE

## 2023-05-13 DIAGNOSIS — I10 HYPERTENSION, ESSENTIAL: Chronic | ICD-10-CM

## 2023-05-13 NOTE — TELEPHONE ENCOUNTER
No care due was identified.  [facility] Embedded Care Due Messages. Reference number: [id number].   5/13/2023 3:17:33 PM CDT

## 2023-05-14 RX ORDER — LORAZEPAM 0.5 MG/1
TABLET ORAL
Qty: 30 TABLET | Refills: 0 | Status: SHIPPED | OUTPATIENT
Start: 2023-05-14 | End: 2023-08-05

## 2023-05-15 ENCOUNTER — TELEPHONE (OUTPATIENT)
Dept: PREADMISSION TESTING | Facility: HOSPITAL | Age: 88
End: 2023-05-15
Payer: MEDICARE

## 2023-05-15 ENCOUNTER — PATIENT MESSAGE (OUTPATIENT)
Dept: INTERNAL MEDICINE | Facility: CLINIC | Age: 88
End: 2023-05-15
Payer: MEDICARE

## 2023-05-15 NOTE — TELEPHONE ENCOUNTER
Spoke to patient confirmed medication list she sent in portal. Instructed her to also take the baby ASA 31mg morning of surgery. Patient verbalized understanding

## 2023-05-17 ENCOUNTER — OFFICE VISIT (OUTPATIENT)
Dept: DERMATOLOGY | Facility: CLINIC | Age: 88
End: 2023-05-17
Payer: MEDICARE

## 2023-05-17 DIAGNOSIS — C44.622 SQUAMOUS CELL CARCINOMA OF RIGHT UPPER EXTREMITY: Primary | ICD-10-CM

## 2023-05-17 PROCEDURE — 99212 OFFICE O/P EST SF 10 MIN: CPT | Mod: S$PBB,,, | Performed by: DERMATOLOGY

## 2023-05-17 PROCEDURE — 99999 PR PBB SHADOW E&M-EST. PATIENT-LVL III: ICD-10-PCS | Mod: PBBFAC,,, | Performed by: DERMATOLOGY

## 2023-05-17 PROCEDURE — 99999 PR PBB SHADOW E&M-EST. PATIENT-LVL III: CPT | Mod: PBBFAC,,, | Performed by: DERMATOLOGY

## 2023-05-17 PROCEDURE — 99212 PR OFFICE/OUTPT VISIT, EST, LEVL II, 10-19 MIN: ICD-10-PCS | Mod: S$PBB,,, | Performed by: DERMATOLOGY

## 2023-05-17 PROCEDURE — 99213 OFFICE O/P EST LOW 20 MIN: CPT | Mod: PBBFAC | Performed by: DERMATOLOGY

## 2023-05-17 NOTE — PROGRESS NOTES
89 y.o. female patient is here for wound check after surgery.    Patient reports no problems. States much better, took doxycycline.     WOUND PE:  The R lower forearm incision is well healed. Mild firmness and erythema of scar. No nodularity. No infection present.    IMPRESSION:  Healing operative site from Mohs' surgery, SCC R lower forearm s/p Mohs with CLC, postop week #4, now with resolved MRSA infection.    PLAN:  Recommended massage tid.  Reassured patient that redness and firmness will fade with time.  Daily SPF.  Regular skin checks.  Intranasal mupirocin for prophylaxis prior to any future procedures to prevent further Staph infections - already has and recently did this.     RTC:  In 3-6 months with Bouchra Curtis M.D. for skin check or sooner if new concern arises.

## 2023-05-19 ENCOUNTER — TELEPHONE (OUTPATIENT)
Dept: ORTHOPEDICS | Facility: CLINIC | Age: 88
End: 2023-05-19
Payer: MEDICARE

## 2023-05-19 ENCOUNTER — PATIENT MESSAGE (OUTPATIENT)
Dept: PODIATRY | Facility: CLINIC | Age: 88
End: 2023-05-19
Payer: MEDICARE

## 2023-05-19 ENCOUNTER — OFFICE VISIT (OUTPATIENT)
Dept: PODIATRY | Facility: CLINIC | Age: 88
DRG: 470 | End: 2023-05-19
Payer: MEDICARE

## 2023-05-19 VITALS
WEIGHT: 136.88 LBS | DIASTOLIC BLOOD PRESSURE: 68 MMHG | SYSTOLIC BLOOD PRESSURE: 121 MMHG | BODY MASS INDEX: 25.19 KG/M2 | HEIGHT: 62 IN | HEART RATE: 71 BPM

## 2023-05-19 DIAGNOSIS — M20.41 HAMMER TOE OF RIGHT FOOT: Primary | ICD-10-CM

## 2023-05-19 DIAGNOSIS — M20.5X1 OVERLAPPING TOE, ACQUIRED, RIGHT: ICD-10-CM

## 2023-05-19 DIAGNOSIS — M21.619 BUNION: ICD-10-CM

## 2023-05-19 PROCEDURE — 99212 OFFICE O/P EST SF 10 MIN: CPT | Mod: S$PBB,,, | Performed by: PODIATRIST

## 2023-05-19 PROCEDURE — 99212 PR OFFICE/OUTPT VISIT, EST, LEVL II, 10-19 MIN: ICD-10-PCS | Mod: S$PBB,,, | Performed by: PODIATRIST

## 2023-05-19 PROCEDURE — 99214 OFFICE O/P EST MOD 30 MIN: CPT | Mod: PBBFAC,PN | Performed by: PODIATRIST

## 2023-05-19 PROCEDURE — 99999 PR PBB SHADOW E&M-EST. PATIENT-LVL IV: CPT | Mod: PBBFAC,,, | Performed by: PODIATRIST

## 2023-05-19 PROCEDURE — 99999 PR PBB SHADOW E&M-EST. PATIENT-LVL IV: ICD-10-PCS | Mod: PBBFAC,,, | Performed by: PODIATRIST

## 2023-05-19 NOTE — TELEPHONE ENCOUNTER
I called and spoke to the patient and let her know that we received her message about her podiatry appointment.    The patient verbalized understanding and has no further questions.     ----- Message from Rosy Jimenez sent at 5/19/2023 11:45 AM CDT -----  Regarding: pt advice  Contact: [phone]  JOSÉ MIGUEL CASTILLO calling regarding Patient Advice (message) for #she received the message from Cate

## 2023-05-19 NOTE — TELEPHONE ENCOUNTER
I called the patient today regarding surgery on 5/22/2023 with Dr. Milton Cobian. I informed the patient that her surgery will be at  [facility] ([facility] [place] [address]). I informed the patient they must arrive at 9:00am and their surgery will start at 11:00am.     Per the [facility] COVID-19 Pre-Procedural Testing Policy (administered 10/26/2022), patients do not need tested for COVID-19 regardless of vaccination status.    I reminded the patient that they cannot eat or drink after midnight, the night before surgery.     I reminded the patient to be careful of their skin over the next few days to make sure they do not get any cuts, scratches or scrapes.    The patient has not yet received their walker, bedside commode or shower chair from [facility]. I told the patient that she needs to call [facility] today at [phone].    The patient is going touch base later this afternoon with how her toe is doing.     The patient verbalized understanding and has no further questions.

## 2023-05-19 NOTE — TELEPHONE ENCOUNTER
I called the patient today regarding her surgery. The patient is good to go for surgery with podiatry. The patient verbalized understanding and has no further questions.

## 2023-05-19 NOTE — TELEPHONE ENCOUNTER
I called the patient today regarding her voice message. I left a message for the patient stating that is she saw podiatry and was cleared for surgery.         ----- Message from Leonidas Sotelo MA sent at 5/19/2023 11:53 AM CDT -----  Regarding: FW: ADVISE  Contact: Self  Hey!     Were you waiting on a call back form the patient?    Sincerely,   Leonidas Sotelo   Medical Assistant   Phone: [phone]  Fax: [phone]    ----- Message -----  From: Wendi Gutiérrez  Sent: 5/19/2023  11:46 AM CDT  To: [facility] Staff  Subject: ADVISE                                           Pt stated she went to the foot doctor and is good to go, ask for a call back      Contact info  [phone] (home)

## 2023-05-19 NOTE — PROGRESS NOTES
PODIATRY NOTE     PATIENT ID:  Gerda Lai is a 89 y.o. female.     CHIEF CONCERN:   Toe Pain (Right great toe)           MEDICAL DECISION MAKING:        ICD-10-CM ICD-9-CM    1. Hammer toe of right foot  M20.41 735.4       2. Bunion  M21.619 727.1       3. Overlapping toe, acquired, right  M20.5X1 735.8             I counseled the patient on the patient's conditions, their implications and medical management.     No acute infection around toenails.  Redness is 2/2 right 2nd toe abutting right bunion.   No open wounds.   Nothing adverse on the feet to contraindicate knee procedure.   Shoe and activity modification as needed for relief.   Wider extra depth shoes.   Call or return to clinic prn if these symptoms worsen or fail to improve as anticipated.           HISTORY OF PRESENT ILLNESS:  Gerda Lai is a 89 y.o. female with concerns regarding: Toe Pain (Right great toe)  Patient reports symptoms/signs have been present a few days, was more red but now improving.  She does not recall acute trauma to the area.   She is concerned about infection as she is having knee surgery on Monday.       Patient Active Problem List   Diagnosis    Personal history of breast cancer    Hypertension, essential    HLD (hyperlipidemia)    CKD (chronic kidney disease), stage III    Hearing loss, sensorineural    Primary osteoarthritis of right knee    Cervical spondylosis    Personal history of skin cancer    Osteoarthritis    Lumbosacral radiculopathy at L5    History of total right hip replacement    Other spondylosis with radiculopathy, lumbar region    Aortic atherosclerosis    Osteopenia    Subclavian artery stenosis, right    Abnormal aldosterone/renin ratio    Anxiety    Carotid arterial disease    Macrocytic anemia    Primary osteoarthritis of left knee s/p TKA 8/10/20    Impaired functional mobility, balance, gait, and endurance    Risk for falls    Frail elderly    Impaired functional mobility, balance, and endurance     Right foot pain    Acute pain of right lower extremity    Weakness of both lower extremities    Lower extremity edema           Current Outpatient Medications on File Prior to Visit   Medication Sig Dispense Refill    acetaminophen (TYLENOL) 650 MG TbSR Take 1 tablet (650 mg total) by mouth every 8 (eight) hours as needed. 120 tablet 0    albuterol (PROVENTIL/VENTOLIN HFA) 90 mcg/actuation inhaler INHALE 1 TO 2 PUFFS BY MOUTH EVERY 6 HOURS AS NEEDED FOR WHEEZE 8.5 g 5    aliskiren (TEKTURNA) 300 MG Tab TAKE 1 TABLET BY MOUTH EVERY DAY 90 tablet 2    aspirin (ECOTRIN) 81 MG EC tablet TAKE 1 TABLET BY MOUTH EVERY DAY 90 tablet 3    clotrimazole-betamethasone 1-0.05% (LOTRISONE) cream Apply topically once daily. To affected toenail. 45 g 1    coenzyme Q10 100 mg capsule Take 100 mg by mouth once daily.      felodipine (PLENDIL) 5 MG 24 hr tablet Take 1 tablet (5 mg total) by mouth once daily. 90 tablet 3    glucosamine-chondroitin 500-400 mg tablet Take 1 tablet by mouth once daily.       labetaloL (NORMODYNE) 100 MG tablet Take 100 mg by mouth 2 (two) times daily.      lactase (LACTAID) 3,000 unit tablet Take 1 tablet by mouth 3 (three) times daily as needed.      LORazepam (ATIVAN) 0.5 MG tablet TAKE 1/2 TABLET TO 1 TABLET BY MOUTH EVERY 12 HOURS AS NEEDED FOR ANXIETY 30 tablet 0    multivitamin capsule Take 1 capsule by mouth once daily.      mupirocin (BACTROBAN) 2 % ointment AAA bid 30 g 3    mupirocin (BACTROBAN) 2 % ointment Apply topically 3 (three) times daily. Apply inside each nostril twice a day for 7 days. 15 g 1    omeprazole (PRILOSEC OTC) 20 MG tablet Take 20 mg by mouth once daily.      polymyxin B sulf-trimethoprim (POLYTRIM) 10,000 unit- 1 mg/mL Drop Place 1 drop into both eyes every 6 (six) hours. 10 mL 0    pravastatin (PRAVACHOL) 80 MG tablet Take 1 tablet (80 mg total) by mouth once daily. 90 tablet 3    spironolactone (ALDACTONE) 25 MG tablet Take 1 tablet (25 mg total) by mouth 3 (three)  "times a week. 30 tablet 11    TURMERIC ORAL Take 500 mg by mouth once daily.       vitamin D (VITAMIN D3) 1000 units Tab Take 1,000 Units by mouth once daily.       Current Facility-Administered Medications on File Prior to Visit   Medication Dose Route Frequency Provider Last Rate Last Admin    fentaNYL 50 mcg/mL injection 25 mcg  25 mcg Intravenous Once PRN Andi Cisneros DO        midazolam (VERSED) 1 mg/mL injection 2 mg  2 mg Intravenous Once PRN Andi Cisneros DO               Review of patient's allergies indicates:   Allergen Reactions    Sulfa (sulfonamide antibiotics) Rash    Clarithromycin Other (See Comments)     Weak, extreme fatigue. dizziness    Flexeril [cyclobenzaprine] Other (See Comments)     Dizziness    Iodine and iodide containing products     Lisinopril Other (See Comments)     Cough and sensation of throat swelling/?angioedema    Losartan Rash    Metoprolol Swelling     Tightness in throat    Spironolactone      Throat tightening    Tramadol Other (See Comments)     Dizzy and weak    Verapamil (bulk) Palpitations    Voltaren [diclofenac sodium] Other (See Comments)     Drops blood pressure               ROS:   General ROS: negative for - chills, fever or night sweats  Respiratory ROS: no cough, shortness of breath, or wheezing  Cardiovascular ROS: no chest pain or dyspnea on exertion  Musculoskeletal ROS: negative for - muscle pain and muscular weakness  Neurological ROS: negative for - impaired coordination/balance and numbness/tingling  Dermatological ROS: negative for pruritus and rash      EXAM:     Vitals:    05/19/23 1102   BP: 121/68   BP Location: Left arm   Patient Position: Sitting   BP Method: Medium (Automatic)   Pulse: 71   Weight: 62.1 kg (136 lb 14.5 oz)   Height: 5' 2" (1.575 m)          General:  Alert and Oriented x 3;  No acute distress      Bilateral  Lower extremity exam:    Vascular:   Dorsalis Pedis:  present   Posterior Tibial:  present  Capillary " refill time:  3 seconds  Temperature of toes cool to touch  Edema:  Trace       Neurological:     Sharp touch:  normal  Light touch: normal  Tinels Sign:  Absent  Mulders Click:   Absent        Dermatological:   Skin: thin and atrophic  Wounds/Ulcers:  Absent  Bruising:  Absent  Erythema:  Absent  Thickened yellow toenail, right hallux, no ingrown.  No acute infection.  No open wounds.  No bruising.  No drainage.  No tenderness to palpation.        Musculoskeletal:   Metatarsophalangeal range of motion:   diminished range of motion  Subtalar joint range of motion: diminished range of motion  Ankle joint range of motion:  full range of motion  Bunions:  Right foot.   Hammertoes: abductovarus rotation of the right hallux, abuts 2nd toe.

## 2023-05-19 NOTE — H&P
CC:  Right knee pain     Gerda Lai is a 89 y.o. female with history of Right knee pain. Pain is worse with activity and weight bearing.  Patient has experienced interference of activities of daily living due to decreased range of motion and an increase in joint pain and swelling.  Patient has failed non-operative treatment including NSAIDs, corticosteroid injections, viscosupplement injections, and activity modification.  Gerda Lai currently ambulates using assistive device .      Relevant medical conditions of significance in perioperative period:  CKD stage 3 management PCP     Patient's age frail condition Given documented medical comorbidities including those listed above and based off of CMS criteria patient would meet inpatient admission status guidelines. This case has been approved as inpatient.           Past Medical History:   Diagnosis Date    Anxiety      Arthritis      Basal cell carcinoma 09/2016     right post auricular neck     Basal cell carcinoma of right forearm 04/19/2023     R lower forearm    Breast cancer 1992     right    Cataract      Fibromyalgia      History of measles as a child      Hyperlipidemia      Hypertension      Personal history of colonic polyps      Pneumonia      SCC (squamous cell carcinoma) 2015     R chest    SCC (squamous cell carcinoma) 2016     left medial shoulder    SCC (squamous cell carcinoma) 2017     left knee    SCC (squamous cell carcinoma) 2022     L upper chest, R upper arm KA types    Shingles      Squamous cell carcinoma 2015     right forearm    Thyroid disease      Vaginitis                 Past Surgical History:   Procedure Laterality Date    ADENOIDECTOMY        BREAST BIOPSY Left       Excisional bx, benign    BREAST LUMPECTOMY Right 1992     DCIS    CARPAL TUNNEL RELEASE Right 3/16/2021     Procedure: RELEASE, CARPAL TUNNEL;  Surgeon: Barbara Aldridge MD;  Location: [facility];  Service: Orthopedics;  Laterality: Right;    CATARACT EXTRACTION W/   INTRAOCULAR LENS IMPLANT Bilateral      EPIDURAL STEROID INJECTION N/A 2/24/2023     Procedure: HEIDI C7-T1;  Surgeon: Andi Cisneros DO;  Location: [facility] OR;  Service: Pain Management;  Laterality: N/A;    EYE SURGERY        HAND SURGERY        INJECTION OF ANESTHETIC AGENT AROUND MEDIAL BRANCH NERVES INNERVATING CERVICAL FACET JOINT N/A 1/4/2022     Procedure: Cervical Medial Branch Block C5-6, C6-7 (No Sedation);  Surgeon: Himanshu Blackmon Jr., MD;  Location: [facility];  Service: Pain Management;  Laterality: N/A;    INJECTION OF ANESTHETIC AGENT AROUND MEDIAL BRANCH NERVES INNERVATING LUMBAR FACET JOINT Left 11/18/2021     Procedure: Cervical Medial Branch Block Left C5-6, C6-7 (IV Sedation);  Surgeon: Himanshu Blackmon Jr., MD;  Location: [facility];  Service: Pain Management;  Laterality: Left;    JOINT REPLACEMENT Right       ELZBIETA    KNEE ARTHROPLASTY Left 8/10/2020     Procedure: ARTHROPLASTY, KNEE-ADE NEXGEN/CEMENTED TIBIA;  Surgeon: Kalin Pacheco MD;  Location: [facility] OR;  Service: Orthopedics;  Laterality: Left;    RADIOFREQUENCY THERMAL COAGULATION OF MEDIAL BRANCH OF POSTERIOR RAMUS OF CERVICAL SPINAL NERVE Left 3/8/2022     Procedure: RADIOFREQUENCY THERMAL COAGULATION, NERVE, SPINAL, CERVICAL, LEFT C5-6 AND C6-7;  Surgeon: Himanshu Blackmon Jr., MD;  Location: [facility];  Service: Pain Management;  Laterality: Left;  NO PACEMAKER   ASA    thyriodectomy         partial    tonsillectomy        TONSILLECTOMY        TOTAL HIP ARTHROPLASTY         right    Yag  Left                 Family History   Problem Relation Age of Onset    Breast cancer Mother      Cancer Mother      Stroke Paternal Grandfather      Heart disease Father      Cancer Paternal Aunt      Heart disease Paternal Aunt      Glaucoma Paternal Grandmother      Amblyopia Neg Hx      Blindness Neg Hx      Cataracts Neg Hx      Diabetes Neg Hx      Hypertension Neg Hx      Macular degeneration Neg Hx      Retinal  detachment Neg Hx      Strabismus Neg Hx      Thyroid disease Neg Hx      Melanoma Neg Hx                 Review of patient's allergies indicates:   Allergen Reactions    Sulfa (sulfonamide antibiotics) Rash    Clarithromycin Other (See Comments)       Weak, extreme fatigue. dizziness    Flexeril [cyclobenzaprine] Other (See Comments)       Dizziness    Iodine and iodide containing products      Lisinopril Other (See Comments)       Cough and sensation of throat swelling/?angioedema    Losartan Rash    Metoprolol Swelling       Tightness in throat    Spironolactone         Throat tightening    Tramadol Other (See Comments)       Dizzy and weak    Verapamil (bulk) Palpitations    Voltaren [diclofenac sodium] Other (See Comments)       Drops blood pressure            Current Outpatient Medications:     acetaminophen (TYLENOL) 650 MG TbSR, Take 1 tablet (650 mg total) by mouth every 8 (eight) hours as needed., Disp: 120 tablet, Rfl: 0    albuterol (PROVENTIL/VENTOLIN HFA) 90 mcg/actuation inhaler, INHALE 1 TO 2 PUFFS BY MOUTH EVERY 6 HOURS AS NEEDED FOR WHEEZE, Disp: 8.5 g, Rfl: 5    aliskiren (TEKTURNA) 300 MG Tab, TAKE 1 TABLET BY MOUTH EVERY DAY, Disp: 90 tablet, Rfl: 2    aspirin (ECOTRIN) 81 MG EC tablet, TAKE 1 TABLET BY MOUTH EVERY DAY, Disp: 90 tablet, Rfl: 3    coenzyme Q10 100 mg capsule, Take 100 mg by mouth once daily., Disp: , Rfl:     felodipine (PLENDIL) 5 MG 24 hr tablet, Take 1 tablet (5 mg total) by mouth once daily., Disp: 90 tablet, Rfl: 3    glucosamine-chondroitin 500-400 mg tablet, Take 1 tablet by mouth once daily. , Disp: , Rfl:     labetaloL (NORMODYNE) 100 MG tablet, Take 100 mg by mouth 2 (two) times daily., Disp: , Rfl:     lactase (LACTAID) 3,000 unit tablet, Take 1 tablet by mouth 3 (three) times daily as needed., Disp: , Rfl:     LORazepam (ATIVAN) 0.5 MG tablet, TAKE 1/2 TABLET TO 1 TABLET BY MOUTH EVERY 12 HOURS AS NEEDED FOR ANXIETY, Disp: 30 tablet, Rfl: 0    multivitamin capsule,  Take 1 capsule by mouth once daily., Disp: , Rfl:     mupirocin (BACTROBAN) 2 % ointment, AAA bid (Patient not taking: Reported on 4/25/2023), Disp: 30 g, Rfl: 3    omeprazole (PRILOSEC OTC) 20 MG tablet, Take 20 mg by mouth once daily., Disp: , Rfl:     polymyxin B sulf-trimethoprim (POLYTRIM) 10,000 unit- 1 mg/mL Drop, Place 1 drop into both eyes every 6 (six) hours., Disp: 10 mL, Rfl: 0    pravastatin (PRAVACHOL) 80 MG tablet, Take 1 tablet (80 mg total) by mouth once daily., Disp: 90 tablet, Rfl: 3    spironolactone (ALDACTONE) 25 MG tablet, Take 1 tablet (25 mg total) by mouth 3 (three) times a week., Disp: 30 tablet, Rfl: 11    TURMERIC ORAL, Take 500 mg by mouth once daily. , Disp: , Rfl:     vitamin D (VITAMIN D3) 1000 units Tab, Take 1,000 Units by mouth once daily., Disp: , Rfl:   No current facility-administered medications for this visit.     Facility-Administered Medications Ordered in Other Visits:     fentaNYL 50 mcg/mL injection 25 mcg, 25 mcg, Intravenous, Once PRN, Andi Cisneros DO    midazolam (VERSED) 1 mg/mL injection 2 mg, 2 mg, Intravenous, Once PRN, Andi Cisneros DO     Review of Systems:  Constitutional: no fever or chills  Eyes: no visual changes  ENT: no nasal congestion or sore throat  Respiratory: no cough or shortness of breath  Cardiovascular: no chest pain or palpitations  Gastrointestinal: no nausea or vomiting, tolerating diet  Genitourinary: no hematuria or dysuria  Integument/Breast: no rash or pruritis  Hematologic/Lymphatic: no easy bruising or lymphadenopathy  Musculoskeletal: positive for knee pain  Neurological: no seizures or tremors  Behavioral/Psych: no auditory or visual hallucinations  Endocrine: no heat or cold intolerance     PE:  There were no vitals taken for this visit.  General: Pleasant, cooperative, NAD   Gait: antalgic  HEENT: NCAT, sclera nonicteric   Lungs: Respirations clear bilaterally; equal and unlabored.   CV: S1S2; 2+ bilateral upper  and lower extremity pulses.   Skin: Intact throughout with no rashes, erythema, or lesions  Extremities: No LE edema,  no erythema or warmth of the skin in either lower extremity.     Right knee exam:  Knee Range of Motion:0-120 degrees flexion, pain with passive range of motion  Effusion:none  Condition of skin:intact  Location of tenderness:Medial joint line and Lateral joint line   Strength:limited by pain and 5 of 5  Stability: Lachman: stable, LCL: stable, MCL: stable, PCL: stable, and posteromedial (dial): stable     Hip Examination: painless PROM of hip      Radiographs: Radiographs reveal advanced degenerative changes including subchondral cyst formation, subchondral sclerosis, osteophyte formation, joint space narrowing.      Knee Alignment:  Significiant valgus     Diagnosis: Primary osteoarthritis Right knee     Plan: Right total knee arthroplasty     Due to the serious nature of total joint infection and the high prevalence of community acquired MRSA, vancomycin will be used perioperatively.                     Electronically signed by Mahesh Almanzar PA-C at 4/26/2023  8:21 AM

## 2023-05-20 RX ORDER — DOCUSATE SODIUM 100 MG/1
100 CAPSULE, LIQUID FILLED ORAL 2 TIMES DAILY
Qty: 60 CAPSULE | Refills: 0 | Status: SHIPPED | OUTPATIENT
Start: 2023-05-20 | End: 2023-06-19

## 2023-05-20 RX ORDER — METHOCARBAMOL 750 MG/1
750 TABLET, FILM COATED ORAL 3 TIMES DAILY PRN
Qty: 21 TABLET | Refills: 0 | Status: SHIPPED | OUTPATIENT
Start: 2023-05-20 | End: 2023-06-19

## 2023-05-20 RX ORDER — ASPIRIN 81 MG/1
81 TABLET ORAL 2 TIMES DAILY
Qty: 60 TABLET | Refills: 0 | Status: SHIPPED | OUTPATIENT
Start: 2023-05-20 | End: 2023-06-29

## 2023-05-20 RX ORDER — OXYCODONE HYDROCHLORIDE 5 MG/1
5 TABLET ORAL EVERY 6 HOURS PRN
Qty: 50 TABLET | Refills: 0 | Status: ON HOLD | OUTPATIENT
Start: 2023-05-20 | End: 2023-06-05 | Stop reason: HOSPADM

## 2023-05-20 RX ORDER — DEXTROMETHORPHAN HYDROBROMIDE, GUAIFENESIN 5; 100 MG/5ML; MG/5ML
650 LIQUID ORAL EVERY 8 HOURS
Qty: 120 TABLET | Refills: 0 | Status: SHIPPED | OUTPATIENT
Start: 2023-05-20

## 2023-05-20 RX ORDER — CEFADROXIL 500 MG/1
500 CAPSULE ORAL EVERY 12 HOURS
Qty: 14 CAPSULE | Refills: 0 | Status: ON HOLD | OUTPATIENT
Start: 2023-05-20 | End: 2023-06-05 | Stop reason: HOSPADM

## 2023-05-22 ENCOUNTER — ANESTHESIA (OUTPATIENT)
Dept: SURGERY | Facility: HOSPITAL | Age: 88
DRG: 470 | End: 2023-05-22
Payer: MEDICARE

## 2023-05-22 ENCOUNTER — HOSPITAL ENCOUNTER (INPATIENT)
Facility: HOSPITAL | Age: 88
LOS: 4 days | Discharge: NURSING, REHAB OR LONG-TERM CARE | DRG: 470 | End: 2023-05-26
Attending: ORTHOPAEDIC SURGERY | Admitting: ORTHOPAEDIC SURGERY
Payer: MEDICARE

## 2023-05-22 ENCOUNTER — ANESTHESIA EVENT (OUTPATIENT)
Dept: SURGERY | Facility: HOSPITAL | Age: 88
DRG: 470 | End: 2023-05-22
Payer: MEDICARE

## 2023-05-22 DIAGNOSIS — M17.11 PRIMARY OSTEOARTHRITIS OF RIGHT KNEE: ICD-10-CM

## 2023-05-22 PROCEDURE — 0055T PR COMPUTER-ASSIST MUSCSKEL NAVIG, ORTHO PROC, CT/MRI: ICD-10-PCS | Mod: ,,, | Performed by: ORTHOPAEDIC SURGERY

## 2023-05-22 PROCEDURE — 11000001 HC ACUTE MED/SURG PRIVATE ROOM

## 2023-05-22 PROCEDURE — 27447 PR TOTAL KNEE ARTHROPLASTY: ICD-10-PCS | Mod: RT,,, | Performed by: ORTHOPAEDIC SURGERY

## 2023-05-22 PROCEDURE — C1713 ANCHOR/SCREW BN/BN,TIS/BN: HCPCS | Performed by: ORTHOPAEDIC SURGERY

## 2023-05-22 PROCEDURE — 0055T BONE SRGRY CMPTR CT/MRI IMAG: CPT | Mod: ,,, | Performed by: ORTHOPAEDIC SURGERY

## 2023-05-22 PROCEDURE — 36000712 HC OR TIME LEV V 1ST 15 MIN: Performed by: ORTHOPAEDIC SURGERY

## 2023-05-22 PROCEDURE — 63600175 PHARM REV CODE 636 W HCPCS: Performed by: PHYSICIAN ASSISTANT

## 2023-05-22 PROCEDURE — 94761 N-INVAS EAR/PLS OXIMETRY MLT: CPT

## 2023-05-22 PROCEDURE — 71000039 HC RECOVERY, EACH ADD'L HOUR: Performed by: ORTHOPAEDIC SURGERY

## 2023-05-22 PROCEDURE — 71000033 HC RECOVERY, INTIAL HOUR: Performed by: ORTHOPAEDIC SURGERY

## 2023-05-22 PROCEDURE — 25000003 PHARM REV CODE 250: Performed by: ANESTHESIOLOGY

## 2023-05-22 PROCEDURE — 63600175 PHARM REV CODE 636 W HCPCS: Performed by: ORTHOPAEDIC SURGERY

## 2023-05-22 PROCEDURE — 25000003 PHARM REV CODE 250: Performed by: ORTHOPAEDIC SURGERY

## 2023-05-22 PROCEDURE — 37000009 HC ANESTHESIA EA ADD 15 MINS: Performed by: ORTHOPAEDIC SURGERY

## 2023-05-22 PROCEDURE — 25000003 PHARM REV CODE 250: Performed by: NURSE ANESTHETIST, CERTIFIED REGISTERED

## 2023-05-22 PROCEDURE — 27201423 OPTIME MED/SURG SUP & DEVICES STERILE SUPPLY: Performed by: ORTHOPAEDIC SURGERY

## 2023-05-22 PROCEDURE — 99900035 HC TECH TIME PER 15 MIN (STAT)

## 2023-05-22 PROCEDURE — 63600175 PHARM REV CODE 636 W HCPCS: Performed by: ANESTHESIOLOGY

## 2023-05-22 PROCEDURE — 37000008 HC ANESTHESIA 1ST 15 MINUTES: Performed by: ORTHOPAEDIC SURGERY

## 2023-05-22 PROCEDURE — 36000713 HC OR TIME LEV V EA ADD 15 MIN: Performed by: ORTHOPAEDIC SURGERY

## 2023-05-22 PROCEDURE — 25000003 PHARM REV CODE 250

## 2023-05-22 PROCEDURE — C1776 JOINT DEVICE (IMPLANTABLE): HCPCS | Performed by: ORTHOPAEDIC SURGERY

## 2023-05-22 PROCEDURE — 27447 TOTAL KNEE ARTHROPLASTY: CPT | Mod: RT,,, | Performed by: ORTHOPAEDIC SURGERY

## 2023-05-22 PROCEDURE — 25000003 PHARM REV CODE 250: Performed by: PHYSICIAN ASSISTANT

## 2023-05-22 PROCEDURE — 63600175 PHARM REV CODE 636 W HCPCS: Performed by: NURSE ANESTHETIST, CERTIFIED REGISTERED

## 2023-05-22 DEVICE — IMPLANTABLE DEVICE: Type: IMPLANTABLE DEVICE | Site: KNEE | Status: FUNCTIONAL

## 2023-05-22 DEVICE — FEMORAL CRUC RTN CEM SZ 3 RT: Type: IMPLANTABLE DEVICE | Site: KNEE | Status: FUNCTIONAL

## 2023-05-22 DEVICE — PATELLA TRIATHLON 31X9 SYMTRC: Type: IMPLANTABLE DEVICE | Site: KNEE | Status: FUNCTIONAL

## 2023-05-22 DEVICE — CEMENT BONE SURG SMPLX P RADPQ: Type: IMPLANTABLE DEVICE | Site: KNEE | Status: FUNCTIONAL

## 2023-05-22 RX ORDER — METHYLPREDNISOLONE ACETATE 40 MG/ML
INJECTION, SUSPENSION INTRA-ARTICULAR; INTRALESIONAL; INTRAMUSCULAR; SOFT TISSUE
Status: DISCONTINUED | OUTPATIENT
Start: 2023-05-22 | End: 2023-05-22 | Stop reason: HOSPADM

## 2023-05-22 RX ORDER — OXYCODONE HYDROCHLORIDE 10 MG/1
10 TABLET ORAL
Status: DISCONTINUED | OUTPATIENT
Start: 2023-05-22 | End: 2023-05-26 | Stop reason: HOSPADM

## 2023-05-22 RX ORDER — KETAMINE HCL IN 0.9 % NACL 50 MG/5 ML
SYRINGE (ML) INTRAVENOUS
Status: DISCONTINUED | OUTPATIENT
Start: 2023-05-22 | End: 2023-05-22

## 2023-05-22 RX ORDER — PROPOFOL 10 MG/ML
VIAL (ML) INTRAVENOUS
Status: DISCONTINUED | OUTPATIENT
Start: 2023-05-22 | End: 2023-05-22

## 2023-05-22 RX ORDER — LIDOCAINE HYDROCHLORIDE AND EPINEPHRINE 15; 5 MG/ML; UG/ML
INJECTION, SOLUTION EPIDURAL
Status: DISCONTINUED | OUTPATIENT
Start: 2023-05-22 | End: 2023-05-22

## 2023-05-22 RX ORDER — LIDOCAINE HYDROCHLORIDE 10 MG/ML
1 INJECTION, SOLUTION EPIDURAL; INFILTRATION; INTRACAUDAL; PERINEURAL
Status: DISCONTINUED | OUTPATIENT
Start: 2023-05-22 | End: 2023-05-22 | Stop reason: HOSPADM

## 2023-05-22 RX ORDER — AMLODIPINE BESYLATE 5 MG/1
5 TABLET ORAL DAILY
Status: DISCONTINUED | OUTPATIENT
Start: 2023-05-23 | End: 2023-05-26 | Stop reason: HOSPADM

## 2023-05-22 RX ORDER — CELECOXIB 200 MG/1
400 CAPSULE ORAL ONCE
Status: DISCONTINUED | OUTPATIENT
Start: 2023-05-22 | End: 2023-05-22

## 2023-05-22 RX ORDER — TALC
6 POWDER (GRAM) TOPICAL NIGHTLY PRN
Status: DISCONTINUED | OUTPATIENT
Start: 2023-05-22 | End: 2023-05-22 | Stop reason: HOSPADM

## 2023-05-22 RX ORDER — CELECOXIB 200 MG/1
200 CAPSULE ORAL DAILY
Status: DISCONTINUED | OUTPATIENT
Start: 2023-05-22 | End: 2023-05-22

## 2023-05-22 RX ORDER — FENTANYL CITRATE 50 UG/ML
100 INJECTION, SOLUTION INTRAMUSCULAR; INTRAVENOUS
Status: DISCONTINUED | OUTPATIENT
Start: 2023-05-22 | End: 2023-05-22 | Stop reason: HOSPADM

## 2023-05-22 RX ORDER — MORPHINE SULFATE 1 MG/ML
INJECTION, SOLUTION EPIDURAL; INTRATHECAL; INTRAVENOUS
Status: DISCONTINUED | OUTPATIENT
Start: 2023-05-22 | End: 2023-05-22 | Stop reason: HOSPADM

## 2023-05-22 RX ORDER — ROPIVACAINE HYDROCHLORIDE 5 MG/ML
INJECTION, SOLUTION EPIDURAL; INFILTRATION; PERINEURAL
Status: DISCONTINUED | OUTPATIENT
Start: 2023-05-22 | End: 2023-05-22 | Stop reason: HOSPADM

## 2023-05-22 RX ORDER — ACETAMINOPHEN 500 MG
1000 TABLET ORAL EVERY 6 HOURS
Status: DISCONTINUED | OUTPATIENT
Start: 2023-05-22 | End: 2023-05-24

## 2023-05-22 RX ORDER — ONDANSETRON 2 MG/ML
INJECTION INTRAMUSCULAR; INTRAVENOUS
Status: DISCONTINUED | OUTPATIENT
Start: 2023-05-22 | End: 2023-05-22

## 2023-05-22 RX ORDER — ALBUTEROL SULFATE 90 UG/1
1 AEROSOL, METERED RESPIRATORY (INHALATION) EVERY 6 HOURS PRN
Status: DISCONTINUED | OUTPATIENT
Start: 2023-05-22 | End: 2023-05-26 | Stop reason: HOSPADM

## 2023-05-22 RX ORDER — MIDAZOLAM HYDROCHLORIDE 1 MG/ML
4 INJECTION INTRAMUSCULAR; INTRAVENOUS
Status: DISCONTINUED | OUTPATIENT
Start: 2023-05-22 | End: 2023-05-22 | Stop reason: HOSPADM

## 2023-05-22 RX ORDER — POLYETHYLENE GLYCOL 3350 17 G/17G
17 POWDER, FOR SOLUTION ORAL DAILY
Status: DISCONTINUED | OUTPATIENT
Start: 2023-05-23 | End: 2023-05-26 | Stop reason: HOSPADM

## 2023-05-22 RX ORDER — SODIUM CHLORIDE 9 MG/ML
INJECTION, SOLUTION INTRAVENOUS CONTINUOUS
Status: ACTIVE | OUTPATIENT
Start: 2023-05-22 | End: 2023-05-23

## 2023-05-22 RX ORDER — TRANEXAMIC ACID 100 MG/ML
INJECTION, SOLUTION INTRAVENOUS
Status: DISCONTINUED | OUTPATIENT
Start: 2023-05-22 | End: 2023-05-22 | Stop reason: HOSPADM

## 2023-05-22 RX ORDER — VANCOMYCIN HYDROCHLORIDE 1 G/20ML
INJECTION, POWDER, LYOPHILIZED, FOR SOLUTION INTRAVENOUS
Status: DISCONTINUED | OUTPATIENT
Start: 2023-05-22 | End: 2023-05-22 | Stop reason: HOSPADM

## 2023-05-22 RX ORDER — MUPIROCIN 20 MG/G
1 OINTMENT TOPICAL 2 TIMES DAILY
Status: DISCONTINUED | OUTPATIENT
Start: 2023-05-22 | End: 2023-05-26 | Stop reason: HOSPADM

## 2023-05-22 RX ORDER — LABETALOL 100 MG/1
100 TABLET, FILM COATED ORAL 2 TIMES DAILY
Status: DISCONTINUED | OUTPATIENT
Start: 2023-05-22 | End: 2023-05-26 | Stop reason: HOSPADM

## 2023-05-22 RX ORDER — METHOCARBAMOL 750 MG/1
750 TABLET, FILM COATED ORAL 3 TIMES DAILY
Status: DISCONTINUED | OUTPATIENT
Start: 2023-05-22 | End: 2023-05-26 | Stop reason: HOSPADM

## 2023-05-22 RX ORDER — AMOXICILLIN 250 MG
1 CAPSULE ORAL 2 TIMES DAILY
Status: DISCONTINUED | OUTPATIENT
Start: 2023-05-22 | End: 2023-05-26 | Stop reason: HOSPADM

## 2023-05-22 RX ORDER — PRAVASTATIN SODIUM 20 MG/1
80 TABLET ORAL DAILY
Status: DISCONTINUED | OUTPATIENT
Start: 2023-05-23 | End: 2023-05-26 | Stop reason: HOSPADM

## 2023-05-22 RX ORDER — ASPIRIN 81 MG/1
81 TABLET ORAL 2 TIMES DAILY
Status: DISCONTINUED | OUTPATIENT
Start: 2023-05-22 | End: 2023-05-22

## 2023-05-22 RX ORDER — NALOXONE HCL 0.4 MG/ML
0.02 VIAL (ML) INJECTION
Status: DISCONTINUED | OUTPATIENT
Start: 2023-05-22 | End: 2023-05-26 | Stop reason: HOSPADM

## 2023-05-22 RX ORDER — FENTANYL CITRATE 50 UG/ML
25 INJECTION, SOLUTION INTRAMUSCULAR; INTRAVENOUS EVERY 5 MIN PRN
Status: DISCONTINUED | OUTPATIENT
Start: 2023-05-22 | End: 2023-05-22 | Stop reason: HOSPADM

## 2023-05-22 RX ORDER — BISACODYL 10 MG
10 SUPPOSITORY, RECTAL RECTAL EVERY 12 HOURS PRN
Status: DISCONTINUED | OUTPATIENT
Start: 2023-05-22 | End: 2023-05-26 | Stop reason: HOSPADM

## 2023-05-22 RX ORDER — NICARDIPINE HYDROCHLORIDE 2.5 MG/ML
INJECTION INTRAVENOUS
Status: DISCONTINUED | OUTPATIENT
Start: 2023-05-22 | End: 2023-05-22

## 2023-05-22 RX ORDER — PREGABALIN 75 MG/1
75 CAPSULE ORAL NIGHTLY
Status: DISCONTINUED | OUTPATIENT
Start: 2023-05-22 | End: 2023-05-23

## 2023-05-22 RX ORDER — ONDANSETRON 2 MG/ML
4 INJECTION INTRAMUSCULAR; INTRAVENOUS EVERY 8 HOURS PRN
Status: DISCONTINUED | OUTPATIENT
Start: 2023-05-22 | End: 2023-05-26 | Stop reason: HOSPADM

## 2023-05-22 RX ORDER — PROCHLORPERAZINE EDISYLATE 5 MG/ML
5 INJECTION INTRAMUSCULAR; INTRAVENOUS EVERY 6 HOURS PRN
Status: DISCONTINUED | OUTPATIENT
Start: 2023-05-22 | End: 2023-05-26 | Stop reason: HOSPADM

## 2023-05-22 RX ORDER — OXYCODONE HYDROCHLORIDE 5 MG/1
5 TABLET ORAL
Status: DISCONTINUED | OUTPATIENT
Start: 2023-05-22 | End: 2023-05-26 | Stop reason: HOSPADM

## 2023-05-22 RX ORDER — SODIUM CHLORIDE 9 MG/ML
INJECTION, SOLUTION INTRAVENOUS
Status: COMPLETED | OUTPATIENT
Start: 2023-05-22 | End: 2023-05-22

## 2023-05-22 RX ORDER — SPIRONOLACTONE 25 MG/1
25 TABLET ORAL
Status: DISCONTINUED | OUTPATIENT
Start: 2023-05-22 | End: 2023-05-26 | Stop reason: HOSPADM

## 2023-05-22 RX ORDER — MUPIROCIN 20 MG/G
1 OINTMENT TOPICAL
Status: COMPLETED | OUTPATIENT
Start: 2023-05-22 | End: 2023-05-22

## 2023-05-22 RX ORDER — PANTOPRAZOLE SODIUM 40 MG/1
40 TABLET, DELAYED RELEASE ORAL DAILY
Status: COMPLETED | OUTPATIENT
Start: 2023-05-22 | End: 2023-05-22

## 2023-05-22 RX ORDER — DEXAMETHASONE SODIUM PHOSPHATE 4 MG/ML
INJECTION, SOLUTION INTRA-ARTICULAR; INTRALESIONAL; INTRAMUSCULAR; INTRAVENOUS; SOFT TISSUE
Status: DISCONTINUED | OUTPATIENT
Start: 2023-05-22 | End: 2023-05-22

## 2023-05-22 RX ORDER — SODIUM CHLORIDE 0.9 % (FLUSH) 0.9 %
10 SYRINGE (ML) INJECTION
Status: DISCONTINUED | OUTPATIENT
Start: 2023-05-22 | End: 2023-05-22 | Stop reason: HOSPADM

## 2023-05-22 RX ORDER — PROPOFOL 10 MG/ML
VIAL (ML) INTRAVENOUS CONTINUOUS PRN
Status: DISCONTINUED | OUTPATIENT
Start: 2023-05-22 | End: 2023-05-22

## 2023-05-22 RX ORDER — FAMOTIDINE 20 MG/1
20 TABLET, FILM COATED ORAL 2 TIMES DAILY
Status: DISCONTINUED | OUTPATIENT
Start: 2023-05-22 | End: 2023-05-26 | Stop reason: HOSPADM

## 2023-05-22 RX ORDER — ACETAMINOPHEN 500 MG
1000 TABLET ORAL
Status: COMPLETED | OUTPATIENT
Start: 2023-05-22 | End: 2023-05-22

## 2023-05-22 RX ORDER — MORPHINE SULFATE 2 MG/ML
2 INJECTION, SOLUTION INTRAMUSCULAR; INTRAVENOUS
Status: DISCONTINUED | OUTPATIENT
Start: 2023-05-22 | End: 2023-05-26 | Stop reason: HOSPADM

## 2023-05-22 RX ORDER — ASPIRIN 81 MG/1
81 TABLET ORAL 2 TIMES DAILY
Status: DISCONTINUED | OUTPATIENT
Start: 2023-05-22 | End: 2023-05-26 | Stop reason: HOSPADM

## 2023-05-22 RX ORDER — PREGABALIN 75 MG/1
75 CAPSULE ORAL
Status: COMPLETED | OUTPATIENT
Start: 2023-05-22 | End: 2023-05-22

## 2023-05-22 RX ORDER — DOCUSATE SODIUM 100 MG/1
100 CAPSULE, LIQUID FILLED ORAL 2 TIMES DAILY
Status: DISCONTINUED | OUTPATIENT
Start: 2023-05-22 | End: 2023-05-26 | Stop reason: HOSPADM

## 2023-05-22 RX ADMIN — ONDANSETRON 4 MG: 2 INJECTION, SOLUTION INTRAMUSCULAR; INTRAVENOUS at 12:05

## 2023-05-22 RX ADMIN — OXYCODONE HYDROCHLORIDE 5 MG: 5 TABLET ORAL at 04:05

## 2023-05-22 RX ADMIN — ACETAMINOPHEN 1000 MG: 500 TABLET ORAL at 07:05

## 2023-05-22 RX ADMIN — VANCOMYCIN HYDROCHLORIDE 1000 MG: 1 INJECTION, POWDER, LYOPHILIZED, FOR SOLUTION INTRAVENOUS at 10:05

## 2023-05-22 RX ADMIN — OXYCODONE HYDROCHLORIDE 5 MG: 5 TABLET ORAL at 09:05

## 2023-05-22 RX ADMIN — ASPIRIN 81 MG: 81 TABLET, COATED ORAL at 09:05

## 2023-05-22 RX ADMIN — SODIUM CHLORIDE: 9 INJECTION, SOLUTION INTRAVENOUS at 12:05

## 2023-05-22 RX ADMIN — LABETALOL HYDROCHLORIDE 100 MG: 100 TABLET, FILM COATED ORAL at 09:05

## 2023-05-22 RX ADMIN — TRANEXAMIC ACID 1000 MG: 100 INJECTION, SOLUTION INTRAVENOUS at 02:05

## 2023-05-22 RX ADMIN — NICARDIPINE HYDROCHLORIDE 0.2 MG: 25 INJECTION INTRAVENOUS at 12:05

## 2023-05-22 RX ADMIN — CEFAZOLIN 2 G: 2 INJECTION, POWDER, FOR SOLUTION INTRAMUSCULAR; INTRAVENOUS at 12:05

## 2023-05-22 RX ADMIN — MUPIROCIN 1 G: 20 OINTMENT TOPICAL at 10:05

## 2023-05-22 RX ADMIN — ACETAMINOPHEN 1000 MG: 500 TABLET ORAL at 10:05

## 2023-05-22 RX ADMIN — TRANEXAMIC ACID 1000 MG: 100 INJECTION, SOLUTION INTRAVENOUS at 12:05

## 2023-05-22 RX ADMIN — Medication 10 MG: at 12:05

## 2023-05-22 RX ADMIN — METHOCARBAMOL 750 MG: 750 TABLET ORAL at 09:05

## 2023-05-22 RX ADMIN — SODIUM CHLORIDE: 9 INJECTION, SOLUTION INTRAVENOUS at 01:05

## 2023-05-22 RX ADMIN — NICARDIPINE HYDROCHLORIDE 0.4 MG: 25 INJECTION INTRAVENOUS at 12:05

## 2023-05-22 RX ADMIN — PROPOFOL 100 MCG/KG/MIN: 10 INJECTION, EMULSION INTRAVENOUS at 12:05

## 2023-05-22 RX ADMIN — PANTOPRAZOLE SODIUM 40 MG: 40 TABLET, DELAYED RELEASE ORAL at 04:05

## 2023-05-22 RX ADMIN — DEXAMETHASONE SODIUM PHOSPHATE 8 MG: 4 INJECTION, SOLUTION INTRAMUSCULAR; INTRAVENOUS at 12:05

## 2023-05-22 RX ADMIN — FAMOTIDINE 20 MG: 20 TABLET ORAL at 09:05

## 2023-05-22 RX ADMIN — SPIRONOLACTONE 25 MG: 25 TABLET, FILM COATED ORAL at 07:05

## 2023-05-22 RX ADMIN — PROPOFOL 20 MG: 10 INJECTION, EMULSION INTRAVENOUS at 12:05

## 2023-05-22 RX ADMIN — PREGABALIN 75 MG: 75 CAPSULE ORAL at 10:05

## 2023-05-22 RX ADMIN — SENNOSIDES AND DOCUSATE SODIUM 1 TABLET: 50; 8.6 TABLET ORAL at 09:05

## 2023-05-22 RX ADMIN — PREGABALIN 75 MG: 75 CAPSULE ORAL at 09:05

## 2023-05-22 RX ADMIN — SODIUM CHLORIDE: 9 INJECTION, SOLUTION INTRAVENOUS at 09:05

## 2023-05-22 RX ADMIN — SODIUM CHLORIDE: 9 INJECTION, SOLUTION INTRAVENOUS at 04:05

## 2023-05-22 RX ADMIN — LIDOCAINE HYDROCHLORIDE,EPINEPHRINE BITARTRATE 4 MG: 15; .005 INJECTION, SOLUTION EPIDURAL; INFILTRATION; INTRACAUDAL; PERINEURAL at 02:05

## 2023-05-22 RX ADMIN — MEPIVACAINE HYDROCHLORIDE 2.4 ML: 20 INJECTION, SOLUTION EPIDURAL; INFILTRATION at 12:05

## 2023-05-22 RX ADMIN — MUPIROCIN 1 G: 20 OINTMENT TOPICAL at 09:05

## 2023-05-22 RX ADMIN — CEFAZOLIN 2 G: 2 INJECTION, POWDER, FOR SOLUTION INTRAMUSCULAR; INTRAVENOUS at 09:05

## 2023-05-22 RX ADMIN — FENTANYL CITRATE 25 MCG: 50 INJECTION, SOLUTION INTRAMUSCULAR; INTRAVENOUS at 05:05

## 2023-05-22 RX ADMIN — SODIUM CHLORIDE: 0.9 INJECTION, SOLUTION INTRAVENOUS at 10:05

## 2023-05-22 NOTE — PT/OT/SLP PROGRESS
Physical Therapy      Patient Name:  Gerda Lai   MRN:  [id number]    Patient not seen today secondary to late surgery . Will follow-up 5/23.

## 2023-05-22 NOTE — OP NOTE
[facility] Right TKA  OPERATIVE NOTE    Date of Operation:   5/22/2023    Providers Performing:   Surgeon(s):  Milton Cobian III, MD  Assistant: Adi Dsouza MD    Operative Procedure:   Right Total Knee Arthroplasty (MAKOplasty) CPT 81839  Computer assisted surgical navigation CPT 0055T    Preoperative Diagnosis:   Right Knee Osteoarthritis, ICD-10 M17.11    Postoperative Diagnosis:   SAME    Components Used:   [place]  Femur: Triathlon CR Size 3  Tibia: Triathlon all poly tibial component Size 3 x 9mm  Patella: Triathlon conv Symmetric Patella 31 mm  2 packs cement: Simplex    Implant Name Type Inv. Item Serial No.  Lot No. LRB No. Used Action   CEMENT BONE SURG SMPLX P RADPQ - [id number]  CEMENT BONE SURG SMPLX P RADPQ  MARTHA Kontera MIRANDA. [id number] Right 1 Implanted   CEMENT BONE SURG SMPLX P RADPQ - [id number]  CEMENT BONE SURG SMPLX P RADPQ  MARTHA Kontera MIRANDA. [id number] Right 1 Implanted   PATELLA TRIATHLON 31X9 SYMTRC - [id number]  PATELLA TRIATHLON 31X9 SYMTRC  MARTHA Kontera MIRANDA. [id number] Right 1 Implanted   FEMORAL CRUC RTN JYOTHI SZ 3 RT - [id number]  FEMORAL CRUC RTN JYOTHI SZ 3 RT  MARTHA SALES MIRANDA. [id number] Right 1 Implanted   TRIATHLON ALL POLY TIBIAL COMPONENT CS #3 9MM THKNS  CS TYP    MARTHA [id number] Right 1 Implanted       Anesthesia:   Spinal+catheter    JACKELYN cocktail: Cobian Block, no toradol    Estimated Blood Loss:   350 cc    Specimens:   None    Complications:   None    Indications:   The patient has failed non-operative measures including medications, injections and activity modifications for their knee.  After an explanation of risks and benefits of knee arthroplasty surgery, the patient wishes to proceed with surgery.    Operative Notes:   The patient was greeted in the pre-op holding area.  The patients knee was marked with a surgical marker by the surgeon.  Spinal-Epidural anesthesia was administered by the anesthesia team.  The patient was placed in the supine position on the OR table  with all bony prominences padded.  A tourniquet was not used.  IV antibiotics were administered.  The leg was prepped and draped in the usual sterile fashion.  A timeout was performed and the correct patient, site and procedure were identified.    The femoral and tibial guide pins where placed with pre-drilling and the arrays were positioned and tightened to the pins.     A midline incision was made with a standard medial parapatellar arthrotomy.  The standard medial capsular release was performed along with the lateral gutter.  The patella was mobilized from the lateral parapatellar ligament and bony osteophytes were removed.  The intercondylar notch was cleared of the ACL.    The hip was then brought through a range of motion and the hip center was identified, medial and lateral malleolus were identified and then began the registration process femur was registered first. The tibia was then registered. We were satisfied with the registration about the femoral and tibial size, it corresponded well on CT scan. Osteophytes were removed. We checked our alignment.     The joint was tensioned in extension and at 90 degrees of flexion to define our gaps. Working with the [place] Robot Tech, the implants were positioned virtually for appropriate size gaps and implant placement.     At this point, the [employer] robotic arm was brought in. It was registered. We cut the femur and tibia. Care was taken during the burring process to protect the soft tissue.  Meniscal remnants were removed following burring.  The knee was brought into flexion and posterior osteophytes were removed.      At this time the trial implants were placed and knee assessed for stability, and flexion arc. The patella was prepared using free-hand technique and the three-holed lug drill guide was sized and appropriately assessed. Patella tracking was found to be acceptable. The tibial component rotation was marked. The trials were removed. The tibial component  was positioned and the keel was drilled and punched.    The wound was copiously irrigated with pulsitile lavage and the bony surfaces dried.  Cement was mixed on the back table and applied to all components.  Cementation of the femoral, tibial and patellar components was performed sequentially with removal of all excess cement. A trial insert was placed to provide compression while the cement dried and a 0.35% betadine solution was left to soak in the surgical field.     After the cement was dry, the trial poly insert was removed. Hemostasis was obtained with bovie electrocautery.  The knee was again copiously irrigated with pulsatile lavage. The final polyethylene insert was placed and the knee reduced.      1 gram of vancomycin powder was placed in the knee. The arthrotomy was closed with interrupted #1 vicryl suture and #2 quill, the subcuticular layer was closed with 2-0 vicryl suture and a running 4-0 monocryl was used to closed the skin surface.  Pin sites were closed with 4-0 monocryl and skin glue. Surgicel sealant was placed over the top of the incision and sterile dressing placed over the wound. Appropriately sized TEDs hose were placed for edema control.     Sponge and needle counts were correct.    The first-assistant was critical to all steps of the operation, including retraction and leg stabilization during exposure and bone preparation, as well as the deep and superficial wound closure.  Dr. Cobian performed and/or supervised the key portions of this surgical procedure, including evaluation of the bone cuts, reshaping of the bony elements, and insertion of the provisional and final components.    Postoperative Plan:  The patient will be anticoagulated with 81mg aspirin twice daily for one month.   They will receive 24 hours of post-operative antibiotics.    Activity will be weight bearing as tolerated.    No celebrex  Istat POD#1 check potassium  Dispo SNF    Due patient and or surgical factors the  patient will receive an Rx for cefadroxil 500mg BID x7 days after discharge per [place] data:   Maureen MM, Shanti JE, Gerry M, Viri DUMAS. The [facility] [place] Clinical Research Award: Extended Oral Antibiotics Prevent Periprosthetic Joint Infection in High-Risk Cases: 3855 Patients With 1-Year Follow-Up. J Arthroplasty. 2021 Jul;36(7S):S18-S25. doi: 10.1016/j.arth.2021.01.051. Epub 2021 Jan 23. PMID: [id number].      Signed by: Milton Cobian III, MD

## 2023-05-22 NOTE — NURSING TRANSFER
Nursing Transfer Note      5/22/2023     Reason patient is being transferred: Post op care complete    Transfer To: recovery suites    Transfer via bed    Transfer with IV fluids, patient chart    Transported by PACU staff    Medicines sent: IV fluid    Any special needs or follow-up needed: PT/OT eval    Chart send with patient: Yes    Notified: rakel, report called to TIRSO Coronel

## 2023-05-22 NOTE — ANESTHESIA PROCEDURE NOTES
CSE    Patient location during procedure: OR  Start time: 5/22/2023 12:20 PM  Timeout: 5/22/2023 12:20 PM  End time: 5/22/2023 12:22 PM      Staffing  Authorizing Provider: Artie Banks MD  Performing Provider: Artie Banks MD    Preanesthetic Checklist  Completed: patient identified, IV checked, site marked, risks and benefits discussed, surgical consent, monitors and equipment checked, pre-op evaluation and timeout performed  CSE  Patient position: sitting  Prep: ChloraPrep  Patient monitoring: heart rate, continuous pulse ox and frequent blood pressure checks  Approach: midline  Spinal Needle  Needle type: pencil-tip   Needle gauge: 25 G  Needle length: 5 in  Epidural Needle  Injection technique: EMILEE air  Needle type: Tuohy   Needle gauge: 17 G  Needle length: 3.5 in  Needle insertion depth: 4 cm  Location: L4-5  Needle localization: anatomical landmarks   Catheter  Catheter type: [employer]  Catheter size: 19 G  Catheter at skin depth: 9 cm  Additional Documentation: negative aspiration for heme and no paresthesia on injection  Assessment  Intrathecal Medications:   administered: primary anesthetic mcg of    Medications:    Medications: Intrathecal - mepivacaine 20 mg/mL (2 %) injection - Intrathecal   2.4 mL - 5/22/2023 12:22:00 PM

## 2023-05-22 NOTE — PT/OT/SLP PROGRESS
Occupational Therapy      Patient Name:  Gerda Lai   MRN:  [id number]    Patient not seen today secondary to late surgery. Will follow-up 5/23 for OT Eval.    5/22/2023

## 2023-05-22 NOTE — ANESTHESIA PREPROCEDURE EVALUATION
05/22/2023  Gerda Lai is a 89 y.o., female.      Pre-op Assessment    I have reviewed the Patient Summary Reports.     I have reviewed the Nursing Notes. I have reviewed the NPO Status.   I have reviewed the Medications.     Review of Systems  Anesthesia Hx:  No problems with previous Anesthesia  History of prior surgery of interest to airway management or planning: Previous anesthesia: General Denies Family Hx of Anesthesia complications.   Denies Personal Hx of Anesthesia complications.   Social:  Non-Smoker    Hematology/Oncology:  Hematology Normal       -- Cancer in past history:  Breast right axillary node dissection   EENT/Dental:EENT/Dental Normal   Cardiovascular:   Exercise tolerance: good Hypertension    Renal/:   Chronic Renal Disease, CKD    Hepatic/GI:  Hepatic/GI Normal    Musculoskeletal:   Arthritis   Spine Disorders: cervical    Neurological:   Neuromuscular Disease,    Endocrine:  Endocrine Normal    Dermatological:   BCC   Psych:   anxiety          Physical Exam  General: Well nourished, Cooperative, Alert and Oriented    Airway:  Mallampati: III / II  Mouth Opening: Normal  TM Distance: Normal  Tongue: Normal  Neck ROM: Normal ROM    Dental:  Intact, Caps / Implants    Chest/Lungs:  Clear to auscultation, Normal Respiratory Rate    Heart:  Rate: Normal  Rhythm: Regular Rhythm  Sounds: Normal    Abdomen:  Normal, Soft, Nontender        Anesthesia Plan  Type of Anesthesia, risks & benefits discussed:    Anesthesia Type: CSE, Spinal  Intra-op Monitoring Plan: Standard ASA Monitors  Post Op Pain Control Plan: multimodal analgesia  Informed Consent: Informed consent signed with the Patient and all parties understand the risks and agree with anesthesia plan.  All questions answered.   ASA Score: 2    Ready For Surgery From Anesthesia Perspective.     .

## 2023-05-22 NOTE — ASSESSMENT & PLAN NOTE
Gerda Lai is a 89 y.o. female s/p R TKA on 5/22/23 with Dr. Cobian. Doing well this morning.    Plan:  Pain control: multimodal  PT/OT: WBAT RLE  DVT PPx: ASA 81 mg BID  Abx: postop Ancef. Discharge on 7 days of cefadroxil 500 mg BID  HTN: home medications restarted  Labs: electrolytes within normal limits; hematocrit 33    Dispo: pending PT/OT. Planning for SNF

## 2023-05-22 NOTE — NURSING
Pt arrived to unit via hospital bed from PACU.  Pt aaox4, vss, no s/s of distress noted.  Dressing to right knee cdi.  Accutherm in place.  Pt instructed to call for assistance when getting up and she verbalized understanding.  Call light placed within reach.  Niece at bedside.

## 2023-05-22 NOTE — TRANSFER OF CARE
"Anesthesia Transfer of Care Note    Patient: Gerda Lai    Procedure(s) Performed: Procedure(s) (LRB):  ARTHROPLASTY, KNEE, TOTAL, USING COMPUTER-ASSISTED NAVIGATION: QUYEN: RIGHT: MARTHA EMERSON (Right)    Patient location: PACU    Anesthesia Type: general    Transport from OR: Transported from OR on 6-10 L/min O2 by face mask with adequate spontaneous ventilation    Post pain: adequate analgesia    Post assessment: no apparent anesthetic complications and tolerated procedure well    Post vital signs: stable    Level of consciousness: sedated    Nausea/Vomiting: no nausea/vomiting    Complications: none    Transfer of care protocol was followed      Last vitals:   Visit Vitals  /88   Pulse (!) 52   Temp 37 °C (98.6 °F) (Oral)   Resp 16   Ht 5' 3" (1.6 m)   Wt 61.2 kg (135 lb)   SpO2 98%   Breastfeeding No   BMI 23.91 kg/m²     "

## 2023-05-23 LAB
BUN SERPL-MCNC: 24 MG/DL (ref 6–30)
CHLORIDE SERPL-SCNC: 106 MMOL/L (ref 95–110)
CREAT SERPL-MCNC: 1 MG/DL (ref 0.5–1.4)
CTP QC/QA: YES
GLUCOSE SERPL-MCNC: 153 MG/DL (ref 70–110)
HCT VFR BLD CALC: 33 %PCV (ref 36–54)
POC IONIZED CALCIUM: 1.2 MMOL/L (ref 1.06–1.42)
POC TCO2 (MEASURED): 22 MMOL/L (ref 23–29)
POTASSIUM BLD-SCNC: 4.3 MMOL/L (ref 3.5–5.1)
SAMPLE: ABNORMAL
SARS-COV-2 AG RESP QL IA.RAPID: NEGATIVE
SODIUM BLD-SCNC: 138 MMOL/L (ref 136–145)

## 2023-05-23 PROCEDURE — 97535 SELF CARE MNGMENT TRAINING: CPT

## 2023-05-23 PROCEDURE — 94799 UNLISTED PULMONARY SVC/PX: CPT

## 2023-05-23 PROCEDURE — 99231 PR SUBSEQUENT HOSPITAL CARE,LEVL I: ICD-10-PCS | Mod: ,,, | Performed by: ANESTHESIOLOGY

## 2023-05-23 PROCEDURE — 97530 THERAPEUTIC ACTIVITIES: CPT

## 2023-05-23 PROCEDURE — 25000003 PHARM REV CODE 250: Performed by: PHYSICIAN ASSISTANT

## 2023-05-23 PROCEDURE — 11000001 HC ACUTE MED/SURG PRIVATE ROOM

## 2023-05-23 PROCEDURE — 99231 PR SUBSEQUENT HOSPITAL CARE,LEVL I: ICD-10-PCS | Mod: ,,, | Performed by: NURSE PRACTITIONER

## 2023-05-23 PROCEDURE — 97165 OT EVAL LOW COMPLEX 30 MIN: CPT

## 2023-05-23 PROCEDURE — 99231 SBSQ HOSP IP/OBS SF/LOW 25: CPT | Mod: ,,, | Performed by: NURSE PRACTITIONER

## 2023-05-23 PROCEDURE — 94761 N-INVAS EAR/PLS OXIMETRY MLT: CPT

## 2023-05-23 PROCEDURE — 25000003 PHARM REV CODE 250

## 2023-05-23 PROCEDURE — 99231 SBSQ HOSP IP/OBS SF/LOW 25: CPT | Mod: ,,, | Performed by: ANESTHESIOLOGY

## 2023-05-23 PROCEDURE — 84132 ASSAY OF SERUM POTASSIUM: CPT

## 2023-05-23 PROCEDURE — 25000003 PHARM REV CODE 250: Performed by: ANESTHESIOLOGY

## 2023-05-23 PROCEDURE — 63600175 PHARM REV CODE 636 W HCPCS: Performed by: PHYSICIAN ASSISTANT

## 2023-05-23 PROCEDURE — 84295 ASSAY OF SERUM SODIUM: CPT

## 2023-05-23 PROCEDURE — 82565 ASSAY OF CREATININE: CPT

## 2023-05-23 PROCEDURE — 97161 PT EVAL LOW COMPLEX 20 MIN: CPT

## 2023-05-23 PROCEDURE — 99900035 HC TECH TIME PER 15 MIN (STAT)

## 2023-05-23 PROCEDURE — 85014 HEMATOCRIT: CPT

## 2023-05-23 PROCEDURE — 82330 ASSAY OF CALCIUM: CPT

## 2023-05-23 RX ORDER — ALISKIREN 150 MG/1
300 TABLET, FILM COATED ORAL DAILY
Status: DISCONTINUED | OUTPATIENT
Start: 2023-05-23 | End: 2023-05-26 | Stop reason: HOSPADM

## 2023-05-23 RX ORDER — PREGABALIN 50 MG/1
50 CAPSULE ORAL NIGHTLY
Status: DISCONTINUED | OUTPATIENT
Start: 2023-05-23 | End: 2023-05-26 | Stop reason: HOSPADM

## 2023-05-23 RX ADMIN — PRAVASTATIN SODIUM 80 MG: 20 TABLET ORAL at 09:05

## 2023-05-23 RX ADMIN — FAMOTIDINE 20 MG: 20 TABLET ORAL at 09:05

## 2023-05-23 RX ADMIN — PREGABALIN 50 MG: 50 CAPSULE ORAL at 08:05

## 2023-05-23 RX ADMIN — METHOCARBAMOL 750 MG: 750 TABLET ORAL at 08:05

## 2023-05-23 RX ADMIN — LABETALOL HYDROCHLORIDE 100 MG: 100 TABLET, FILM COATED ORAL at 09:05

## 2023-05-23 RX ADMIN — ACETAMINOPHEN 1000 MG: 500 TABLET ORAL at 06:05

## 2023-05-23 RX ADMIN — METHOCARBAMOL 750 MG: 750 TABLET ORAL at 03:05

## 2023-05-23 RX ADMIN — POLYETHYLENE GLYCOL 3350 17 G: 17 POWDER, FOR SOLUTION ORAL at 09:05

## 2023-05-23 RX ADMIN — ALISKIREN HEMIFUMARATE 300 MG: 150 TABLET, FILM COATED ORAL at 10:05

## 2023-05-23 RX ADMIN — CEFAZOLIN 2 G: 2 INJECTION, POWDER, FOR SOLUTION INTRAMUSCULAR; INTRAVENOUS at 04:05

## 2023-05-23 RX ADMIN — MUPIROCIN 1 G: 20 OINTMENT TOPICAL at 08:05

## 2023-05-23 RX ADMIN — ASPIRIN 81 MG: 81 TABLET, COATED ORAL at 08:05

## 2023-05-23 RX ADMIN — ACETAMINOPHEN 1000 MG: 500 TABLET ORAL at 12:05

## 2023-05-23 RX ADMIN — SENNOSIDES AND DOCUSATE SODIUM 1 TABLET: 50; 8.6 TABLET ORAL at 08:05

## 2023-05-23 RX ADMIN — ASPIRIN 81 MG: 81 TABLET, COATED ORAL at 09:05

## 2023-05-23 RX ADMIN — LABETALOL HYDROCHLORIDE 100 MG: 100 TABLET, FILM COATED ORAL at 08:05

## 2023-05-23 RX ADMIN — METHOCARBAMOL 750 MG: 750 TABLET ORAL at 09:05

## 2023-05-23 RX ADMIN — AMLODIPINE BESYLATE 5 MG: 5 TABLET ORAL at 09:05

## 2023-05-23 RX ADMIN — MUPIROCIN 1 G: 20 OINTMENT TOPICAL at 09:05

## 2023-05-23 RX ADMIN — ACETAMINOPHEN 1000 MG: 500 TABLET ORAL at 05:05

## 2023-05-23 RX ADMIN — DOCUSATE SODIUM 100 MG: 100 CAPSULE, LIQUID FILLED ORAL at 09:05

## 2023-05-23 RX ADMIN — FAMOTIDINE 20 MG: 20 TABLET ORAL at 08:05

## 2023-05-23 NOTE — PLAN OF CARE
Patient tolerated PT session well. Patient ambulated 50ft with RW and contact guard assistance. No LOB or SOB noted. Patient performed R LE therex x10 reps. Patient would benefit from SNF placement in order to get back to being independent prior to discharging home.     Problem: Physical Therapy  Goal: Physical Therapy Goal  Description: Goals to be met by: 2023    Patient will increase functional independence with mobility by performin. Supine to sit with supervision  2. Sit to stand transfer with Supervision  3. Gait x150 feet with Supervision using Rolling Walker  4. Ascend/Descend 1 curb step with Stand by assistance using Rolling Walker  5. Lower extremity exercise program x30 reps per handout, with supervision       Outcome: Ongoing, Progressing

## 2023-05-23 NOTE — PLAN OF CARE
PT/OT eval completed and recommending skilled nursing care at d/c. Referral sent to [facility]. Patient accepted for admission to facility on 5/25/23 once she is medically stable. Patient agreeable to placement and cited [facility] as her first choice. Will continue to follow for needs.      Rafaela Delgadillo RNCM  Case Management  [facility]  [phone]

## 2023-05-23 NOTE — PLAN OF CARE
Problem: Adult Inpatient Plan of Care  Goal: Absence of Hospital-Acquired Illness or Injury  Intervention: Identify and Manage Fall Risk  Flowsheets (Taken 5/23/2023 0252)  Safety Promotion/Fall Prevention:   assistive device/personal item within reach   side rails raised x 2   instructed to call staff for mobility   Fall Risk reviewed with patient/family   nonskid shoes/socks when out of bed   medications reviewed     Problem: Adult Inpatient Plan of Care  Goal: Absence of Hospital-Acquired Illness or Injury  Intervention: Prevent and Manage VTE (Venous Thromboembolism) Risk  Flowsheets (Taken 5/23/2023 0252)  Activity Management: Ambulated to bathroom - L4  VTE Prevention/Management:   remove, assess skin, and reapply foot pump   intravenous hydration   fluids promoted   dorsiflexion/plantar flexion performed  Range of Motion: active ROM (range of motion) encouraged     Problem: Adult Inpatient Plan of Care  Goal: Absence of Hospital-Acquired Illness or Injury  Intervention: Prevent Infection  Flowsheets (Taken 5/23/2023 0252)  Infection Prevention:   hand hygiene promoted   single patient room provided   rest/sleep promoted

## 2023-05-23 NOTE — PLAN OF CARE
Problem: Pain Acute  Goal: Acceptable Pain Control and Functional Ability  Intervention: Develop Pain Management Plan  Flowsheets (Taken 5/22/2023 1919)  Pain Management Interventions:   care clustered   cold applied   pain management plan reviewed with patient/caregiver     Problem: Pain Acute  Goal: Acceptable Pain Control and Functional Ability  Intervention: Prevent or Manage Pain  Flowsheets (Taken 5/22/2023 1919)  Sensory Stimulation Regulation:   care clustered   lighting decreased   quiet environment promoted  Medication Review/Management: medications reviewed     Problem: Pain Acute  Goal: Acceptable Pain Control and Functional Ability  Intervention: Optimize Psychosocial Wellbeing  Flowsheets (Taken 5/22/2023 1919)  Supportive Measures:   active listening utilized   positive reinforcement provided     Problem: Fall Injury Risk  Goal: Absence of Fall and Fall-Related Injury  Intervention: Identify and Manage Contributors  Flowsheets (Taken 5/22/2023 1919)  Medication Review/Management: medications reviewed     Problem: Fall Injury Risk  Goal: Absence of Fall and Fall-Related Injury  Intervention: Promote Injury-Free Environment  Flowsheets (Taken 5/22/2023 1919)  Safety Promotion/Fall Prevention:   assistive device/personal item within reach   Fall Risk reviewed with patient/family   side rails raised x 2   lighting adjusted   instructed to call staff for mobility     Problem: Adjustment to Surgery (Knee Arthroplasty)  Goal: Optimal Coping  Intervention: Support Psychosocial Response to Surgery and Mobility Changes  Flowsheets (Taken 5/22/2023 1919)  Supportive Measures:   active listening utilized   positive reinforcement provided     Problem: Bleeding (Knee Arthroplasty)  Goal: Absence of Bleeding  Intervention: Monitor and Manage Bleeding  Flowsheets (Taken 5/22/2023 1919)  Bleeding Management: dressing monitored     Problem: Infection (Knee Arthroplasty)  Goal: Absence of Infection Signs and  Symptoms  Intervention: Prevent or Manage Infection  Flowsheets (Taken 5/22/2023 1919)  Infection Management: aseptic technique maintained     Problem: Pain (Knee Arthroplasty)  Goal: Acceptable Pain Control  Intervention: Prevent or Manage Pain  Flowsheets (Taken 5/22/2023 1919)  Pain Management Interventions:   care clustered   cold applied   pain management plan reviewed with patient/caregiver

## 2023-05-23 NOTE — PROGRESS NOTES
[facility])  Orthopedics  Progress Note    Patient Name: Gerda Lai  MRN: [id number]  Admission Date: 5/22/2023  Hospital Length of Stay: 1 days  Attending Provider: Milton Cobian III, MD  Primary Care Provider: Tomas Beltran MD  Follow-up For: Procedure(s) (LRB):  ARTHROPLASTY, KNEE, TOTAL, USING COMPUTER-ASSISTED NAVIGATION: QUYEN: RIGHT: MARTHA - TRIATHALON (Right)    Post-Operative Day: 1 Day Post-Op  Subjective:     Principal Problem:Primary osteoarthritis of right knee    Principal Orthopedic Problem: same, s/p R TKA 5/22    Interval History: No acute events overnight. Vitals with some slight hypertension, but otherwise within normal limits. Pain well controlled. Patient not seen by PT yesterday - anticipate PT today. Denies numbness, tingling, and weakness. Adequate urine output overnight.    Review of patient's allergies indicates:   Allergen Reactions    Sulfa (sulfonamide antibiotics) Rash    Clarithromycin Other (See Comments)     Weak, extreme fatigue. dizziness    Flexeril [cyclobenzaprine] Other (See Comments)     Dizziness    Iodine and iodide containing products     Lisinopril Other (See Comments)     Cough and sensation of throat swelling/?angioedema    Losartan Rash    Metoprolol Swelling     Tightness in throat    Spironolactone      Throat tightening    Tramadol Other (See Comments)     Dizzy and weak    Verapamil (bulk) Palpitations    Voltaren [diclofenac sodium] Other (See Comments)     Drops blood pressure       Current Facility-Administered Medications   Medication    0.9%  NaCl infusion    acetaminophen tablet 1,000 mg    albuterol inhaler 1 puff    aliskiren tablet 300 mg    amLODIPine tablet 5 mg    aspirin EC tablet 81 mg    bisacodyL suppository 10 mg    docusate sodium capsule 100 mg    famotidine tablet 20 mg    labetaloL tablet 100 mg    methocarbamoL tablet 750 mg    morphine injection 2 mg    mupirocin 2 % ointment 1 g    naloxone 0.4  "mg/mL injection 0.02 mg    ondansetron injection 4 mg    oxyCODONE immediate release tablet 5 mg    oxyCODONE immediate release tablet Tab 10 mg    polyethylene glycol packet 17 g    pravastatin tablet 80 mg    pregabalin capsule 75 mg    prochlorperazine injection Soln 5 mg    senna-docusate 8.6-50 mg per tablet 1 tablet    spironolactone tablet 25 mg     Facility-Administered Medications Ordered in Other Encounters   Medication    fentaNYL 50 mcg/mL injection 25 mcg    midazolam (VERSED) 1 mg/mL injection 2 mg     Objective:     Vital Signs (Most Recent):  Temp: 97.1 °F (36.2 °C) (05/23/23 0413)  Pulse: 60 (05/23/23 0413)  Resp: 16 (05/23/23 0413)  BP: (!) 152/68 (05/23/23 0413)  SpO2: 99 % (05/23/23 0413) Vital Signs (24h Range):  Temp:  [97 °F (36.1 °C)-98.6 °F (37 °C)] 97.1 °F (36.2 °C)  Pulse:  [51-63] 60  Resp:  [15-20] 16  SpO2:  [93 %-100 %] 99 %  BP: (130-173)/(60-88) 152/68     Weight: 61.2 kg (135 lb)  Height: 5' 3" (160 cm)  Body mass index is 23.91 kg/m².      Intake/Output Summary (Last 24 hours) at 5/23/2023 0606  Last data filed at 5/23/2023 0115  Gross per 24 hour   Intake 2040 ml   Output 1200 ml   Net 840 ml        Ortho/SPM Exam  Gen: NAD, sitting comfortably in bed  CV: regular rate  Resp: non-labored respirations    RLE:  Dressing clean, dry, and intact  No significant hematoma over operative site  Appropriate postoperative TTP  SILT Sa/Douglas/DP/SP/T  Motor intact EHL/FHL/TA/Gastroc  2+ DP, 2+ PT     Significant Labs: All pertinent labs within the past 24 hours have been reviewed.    Significant Imaging: I have reviewed and interpreted all pertinent imaging results/findings.    Assessment/Plan:     * Primary osteoarthritis of right knee  Gerda Lai is a 89 y.o. female s/p R TKA on 5/22/23 with Dr. Cobian. Doing well this morning.    Plan:  Pain control: multimodal  PT/OT: WBAT RLE  DVT PPx: ASA 81 mg BID  Abx: postop Ancef. Discharge on 7 days of cefadroxil 500 mg BID  HTN: home " medications restarted  Labs: electrolytes within normal limits; hematocrit 33    Dispo: pending PT/OT. Planning for SNF            Adi Dsouza MD  Orthopedics  [place] [facility] ([facility])

## 2023-05-23 NOTE — PT/OT/SLP EVAL
Physical Therapy Evaluation and Treatment    Patient Name: Gerda Lai   MRN: [id number]  Recent Surgery: Procedure(s) (LRB):  ARTHROPLASTY, KNEE, TOTAL, USING COMPUTER-ASSISTED NAVIGATION: QUYEN: RIGHT: MARTHA - TRIATHALON (Right) 1 Day Post-Op    Recommendations:     Discharge Recommendations: nursing facility, skilled   Discharge Equipment Recommendations: none   Barriers to discharge: Decreased caregiver support    Assessment:     Gerda Lai is a 89 y.o. female admitted with a medical diagnosis of Primary osteoarthritis of right knee. She presents with the following impairments/functional limitations: weakness, impaired functional mobility, gait instability, impaired balance, decreased lower extremity function, pain, decreased ROM, orthopedic precautions. Patient tolerated PT session well. Patient ambulated 50ft with RW and contact guard assistance. No LOB or SOB noted. Patient performed R LE therex x10 reps. Patient would benefit from SNF placement in order to get back to being independent prior to discharging home.     Rehab Prognosis: Good; patient would benefit from acute PT services to address these deficits and reach maximum level of function.    Plan:     During this hospitalization, patient to be seen daily to address the above listed problems via gait training, therapeutic activities, therapeutic exercises    Plan of Care Expires: 06/23/23    Subjective     Chief Complaint: Right knee pain.   Patient Comments/Goals: To walk without pain.   Pain/Comfort:  Pain Rating 1: 5/10  Location - Side 1: Right  Location 1: knee  Pain Addressed 1: Pre-medicate for activity, Reposition, Distraction    Social History:  Living Environment: Patient lives alone in a single story home with 1 step to enter.   Prior Level of Function: Prior to admission, patient was modified independent with ADLs using rolling walker for mobility. She still drives.  Equipment at Home: bedside commode, walker, rolling, rollator, shower  chair, cane, quad, cane, straight  Assistance Upon Discharge:  rakel    Objective:     Communicated with RN prior to session. Patient found up in chair with cryotherapy upon PT entry to room.    General Precautions: Standard, fall   Orthopedic Precautions: RLE weight bearing as tolerated   Braces: N/A    Respiratory Status: Room air    Exams:  RLE ROM: WFL except limited at knee due to pain  RLE Strength:  appears WFL but did not formally assess due to pain and recent surgery  LLE ROM: WFL  LLE Strength: WFL  Cognitive: Patient is oriented to Person, Place, Time, Situation    Functional Mobility:  Gait belt applied - Yes  Bed Mobility  Seated in chair at start of session and returned to chair  Transfers  Sit to Stand: contact guard assistance with rolling walker x1 from bedside chair   Gait  Patient ambulated 50ft with rolling walker and contact guard assistance. Patient required cues for sequencing and weight bearing status to increase independence and safety.     Therapeutic Activities and Exercises:  Patient educated in and performed R LE exercises x10 reps for ankle pumps, quad set, glute set, SAQ over bolster, heel slides, hip abd/add, and LAQ.     Patient educated in:  -PT role and POC  -safety with transfers including hand placement  -gait sequencing and RW management  -OOB activity to maximize recovery including ambulating with nursing staff assistance and RW while in the hospital   -HEP for therex at home with handout provided       AM-PAC 6 CLICK MOBILITY  Total Score:17    Patient left up in chair with all lines intact, call button in reach, and RN notified.    GOALS:   Multidisciplinary Problems       Physical Therapy Goals          Problem: Physical Therapy    Goal Priority Disciplines Outcome Goal Variances Interventions   Physical Therapy Goal     PT, PT/OT Ongoing, Progressing     Description: Goals to be met by: 5/30/2023    Patient will increase functional independence with mobility by  performin. Supine to sit with supervision  2. Sit to stand transfer with Supervision  3. Gait x150 feet with Supervision using Rolling Walker  4. Ascend/Descend 1 curb step with Stand by assistance using Rolling Walker  5. Lower extremity exercise program x30 reps per handout, with supervision                            History:     Past Medical History:   Diagnosis Date    Anxiety     Arthritis     Basal cell carcinoma 2016    right post auricular neck     Basal cell carcinoma of right forearm 2023    R lower forearm    Breast cancer     right    Cataract     Fibromyalgia     History of measles as a child     Hyperlipidemia     Hypertension     Personal history of colonic polyps     Pneumonia     SCC (squamous cell carcinoma)     R chest    SCC (squamous cell carcinoma)     left medial shoulder    SCC (squamous cell carcinoma)     left knee    SCC (squamous cell carcinoma)     L upper chest, R upper arm KA types    Shingles     Squamous cell carcinoma     right forearm    Thyroid disease     Vaginitis        Past Surgical History:   Procedure Laterality Date    ADENOIDECTOMY      ARTHROPLASTY, KNEE, TOTAL, USING COMPUTER-ASSISTED NAVIGATION Right 2023    Procedure: ARTHROPLASTY, KNEE, TOTAL, USING COMPUTER-ASSISTED NAVIGATION: QUYEN: RIGHT: MARTHA - IDANIA;  Surgeon: Milton Cobian III, MD;  Location: [facility] OR;  Service: Orthopedics;  Laterality: Right;    BREAST BIOPSY Left     Excisional bx, benign    BREAST LUMPECTOMY Right     DCIS    CARPAL TUNNEL RELEASE Right 3/16/2021    Procedure: RELEASE, CARPAL TUNNEL;  Surgeon: Barbara Aldridge MD;  Location: [facility] OR;  Service: Orthopedics;  Laterality: Right;    CATARACT EXTRACTION W/  INTRAOCULAR LENS IMPLANT Bilateral     EPIDURAL STEROID INJECTION N/A 2023    Procedure: HEIDI C7-T1;  Surgeon: Andi Cisneros DO;  Location: [facility] OR;  Service: Pain Management;  Laterality: N/A;    EYE SURGERY      HAND  SURGERY      INJECTION OF ANESTHETIC AGENT AROUND MEDIAL BRANCH NERVES INNERVATING CERVICAL FACET JOINT N/A 1/4/2022    Procedure: Cervical Medial Branch Block C5-6, C6-7 (No Sedation);  Surgeon: Himanshu Blackmon Jr., MD;  Location: [facility] PAIN MGT;  Service: Pain Management;  Laterality: N/A;    INJECTION OF ANESTHETIC AGENT AROUND MEDIAL BRANCH NERVES INNERVATING LUMBAR FACET JOINT Left 11/18/2021    Procedure: Cervical Medial Branch Block Left C5-6, C6-7 (IV Sedation);  Surgeon: Himanshu Blackmon Jr., MD;  Location: [facility] PAIN [facility]GT;  Service: Pain Management;  Laterality: Left;    JOINT REPLACEMENT Right     ELZBIETA    KNEE ARTHROPLASTY Left 8/10/2020    Procedure: ARTHROPLASTY, KNEE-ADE NEXGEN/CEMENTED TIBIA;  Surgeon: Kalin Pacheco MD;  Location: [facility];  Service: Orthopedics;  Laterality: Left;    RADIOFREQUENCY THERMAL COAGULATION OF MEDIAL BRANCH OF POSTERIOR RAMUS OF CERVICAL SPINAL NERVE Left 3/8/2022    Procedure: RADIOFREQUENCY THERMAL COAGULATION, NERVE, SPINAL, CERVICAL, LEFT C5-6 AND C6-7;  Surgeon: Himanshu Blackmon Jr., MD;  Location: [facility] PAIN [facility]GT;  Service: Pain Management;  Laterality: Left;  NO PACEMAKER   ASA    thyriodectomy      partial    tonsillectomy      TONSILLECTOMY      TOTAL HIP ARTHROPLASTY      right    Yag  Left        Time Tracking:     PT Received On: 05/23/23  PT Start Time: 0907  PT Stop Time: 0928  PT Total Time (min): 21 min     Billable Minutes: Evaluation 10 and Therapeutic Activity 11    5/23/2023

## 2023-05-23 NOTE — ADDENDUM NOTE
Addendum  created 05/23/23 1319 by Charity Chapin, RN    Order list changed, Pharmacy for encounter modified

## 2023-05-23 NOTE — SUBJECTIVE & OBJECTIVE
Principal Problem:Primary osteoarthritis of right knee    Principal Orthopedic Problem: same, s/p R TKA 5/22    Interval History: No acute events overnight. Vitals with some slight hypertension, but otherwise within normal limits. Pain well controlled. Patient not seen by PT yesterday - anticipate PT today. Denies numbness, tingling, and weakness. Adequate urine output overnight.    Review of patient's allergies indicates:   Allergen Reactions    Sulfa (sulfonamide antibiotics) Rash    Clarithromycin Other (See Comments)     Weak, extreme fatigue. dizziness    Flexeril [cyclobenzaprine] Other (See Comments)     Dizziness    Iodine and iodide containing products     Lisinopril Other (See Comments)     Cough and sensation of throat swelling/?angioedema    Losartan Rash    Metoprolol Swelling     Tightness in throat    Spironolactone      Throat tightening    Tramadol Other (See Comments)     Dizzy and weak    Verapamil (bulk) Palpitations    Voltaren [diclofenac sodium] Other (See Comments)     Drops blood pressure       Current Facility-Administered Medications   Medication    0.9%  NaCl infusion    acetaminophen tablet 1,000 mg    albuterol inhaler 1 puff    aliskiren tablet 300 mg    amLODIPine tablet 5 mg    aspirin EC tablet 81 mg    bisacodyL suppository 10 mg    docusate sodium capsule 100 mg    famotidine tablet 20 mg    labetaloL tablet 100 mg    methocarbamoL tablet 750 mg    morphine injection 2 mg    mupirocin 2 % ointment 1 g    naloxone 0.4 mg/mL injection 0.02 mg    ondansetron injection 4 mg    oxyCODONE immediate release tablet 5 mg    oxyCODONE immediate release tablet Tab 10 mg    polyethylene glycol packet 17 g    pravastatin tablet 80 mg    pregabalin capsule 75 mg    prochlorperazine injection Soln 5 mg    senna-docusate 8.6-50 mg per tablet 1 tablet    spironolactone tablet 25 mg     Facility-Administered Medications Ordered in Other Encounters   Medication    fentaNYL 50 mcg/mL injection 25 mcg  "   midazolam (VERSED) 1 mg/mL injection 2 mg     Objective:     Vital Signs (Most Recent):  Temp: 97.1 °F (36.2 °C) (05/23/23 0413)  Pulse: 60 (05/23/23 0413)  Resp: 16 (05/23/23 0413)  BP: (!) 152/68 (05/23/23 0413)  SpO2: 99 % (05/23/23 0413) Vital Signs (24h Range):  Temp:  [97 °F (36.1 °C)-98.6 °F (37 °C)] 97.1 °F (36.2 °C)  Pulse:  [51-63] 60  Resp:  [15-20] 16  SpO2:  [93 %-100 %] 99 %  BP: (130-173)/(60-88) 152/68     Weight: 61.2 kg (135 lb)  Height: 5' 3" (160 cm)  Body mass index is 23.91 kg/m².      Intake/Output Summary (Last 24 hours) at 5/23/2023 0606  Last data filed at 5/23/2023 0115  Gross per 24 hour   Intake 2040 ml   Output 1200 ml   Net 840 ml        Ortho/SPM Exam  Gen: NAD, sitting comfortably in bed  CV: regular rate  Resp: non-labored respirations    RLE:  Dressing clean, dry, and intact  No significant hematoma over operative site  Appropriate postoperative TTP  SILT Sa/Douglas/DP/SP/T  Motor intact EHL/FHL/TA/Gastroc  2+ DP, 2+ PT     Significant Labs: All pertinent labs within the past 24 hours have been reviewed.    Significant Imaging: I have reviewed and interpreted all pertinent imaging results/findings.  "

## 2023-05-23 NOTE — PLAN OF CARE
Problem: Occupational Therapy  Goal: Occupational Therapy Goal  Description: Goals to be met by: 5/25/23     Patient will increase functional independence with ADLs by performing:    UE Dressing with Modified [place].  LE Dressing with Modified [place].  Grooming while standing at sink with Modified [place].  Toileting from toilet/bedside commode with Modified [place] for hygiene and clothing management.   Toilet transfer to toilet/bedside commode with Supervision and LRAD.    Outcome: Ongoing, Progressing   OT eval complete with goals and POC established.

## 2023-05-23 NOTE — PLAN OF CARE
Problem: Pain Acute  Goal: Acceptable Pain Control and Functional Ability  Intervention: Prevent or Manage Pain  Flowsheets (Taken 5/23/2023 1131)  Sensory Stimulation Regulation:   care clustered   lighting decreased  Medication Review/Management: medications reviewed     Problem: Pain Acute  Goal: Acceptable Pain Control and Functional Ability  Intervention: Optimize Psychosocial Wellbeing  Flowsheets (Taken 5/23/2023 1131)  Supportive Measures:   active listening utilized   positive reinforcement provided     Problem: Fall Injury Risk  Goal: Absence of Fall and Fall-Related Injury  Intervention: Promote Injury-Free Environment  Flowsheets (Taken 5/23/2023 1131)  Safety Promotion/Fall Prevention:   assistive device/personal item within reach   Fall Risk reviewed with patient/family   nonskid shoes/socks when out of bed   instructed to call staff for mobility     Problem: Adjustment to Surgery (Knee Arthroplasty)  Goal: Optimal Coping  Intervention: Support Psychosocial Response to Surgery and Mobility Changes  Flowsheets (Taken 5/23/2023 1131)  Supportive Measures:   active listening utilized   positive reinforcement provided     Problem: Bleeding (Knee Arthroplasty)  Goal: Absence of Bleeding  Intervention: Monitor and Manage Bleeding  Flowsheets (Taken 5/23/2023 1131)  Bleeding Management: dressing monitored     Problem: Infection (Knee Arthroplasty)  Goal: Absence of Infection Signs and Symptoms  Intervention: Prevent or Manage Infection  Flowsheets (Taken 5/23/2023 1131)  Infection Prevention: hand hygiene promoted     Problem: Pain (Knee Arthroplasty)  Goal: Acceptable Pain Control  Intervention: Prevent or Manage Pain  Flowsheets (Taken 5/23/2023 1131)  Pain Management Interventions:   care clustered   cold applied   pain management plan reviewed with patient/caregiver

## 2023-05-23 NOTE — PT/OT/SLP EVAL
Occupational Therapy   Evaluation and Treatment    Name: Gerda Lai  MRN: [id number]  Admitting Diagnosis: Primary osteoarthritis of right knee  Recent Surgery: Procedure(s) (LRB):  ARTHROPLASTY, KNEE, TOTAL, USING COMPUTER-ASSISTED NAVIGATION: QUYEN: RIGHT: MARTHA - TRIATHALON (Right) 1 Day Post-Op    Recommendations:     Discharge Recommendations: nursing facility, skilled  Discharge Equipment Recommendations:  none  Barriers to discharge:  Decreased caregiver support    Assessment:     Gerda Lai is a 89 y.o. female with a medical diagnosis of Primary osteoarthritis of right knee.  She presents with s/p (R) TKA POD1. Pt performed supine>sit with SBA, UBD with modified independence, LBD with SBA, toileting with SPV, and toilet T/F with CGA and RW. Education provided on weightbearing and surgical precautions. Performance deficits affecting function: weakness, impaired self care skills, impaired functional mobility, gait instability, impaired balance, pain, decreased safety awareness, decreased lower extremity function, decreased ROM, orthopedic precautions.      Rehab Prognosis: Good; patient would benefit from acute skilled OT services to address these deficits and reach maximum level of function.       Plan:     Patient to be seen daily to address the above listed problems via self-care/home management, therapeutic activities, therapeutic exercises  Plan of Care Expires: 05/25/23  Plan of Care Reviewed with: patient    Subjective     Chief Complaint: pain  Patient/Family Comments/goals: to get more therapy    Occupational Profile:  Living Environment: Pt lives alone [facility] with 1 JUNO and no HR. W-I-S with shower chair  Previous level of function: Modified independent with ADLs and fx mobility using RW.  Roles and Routines: (+) drives  Equipment Used at Home: bedside commode, cane, quad, cane, straight, rollator, shower chair, walker, rolling  Assistance upon Discharge: rakel    Pain/Comfort:  Pain Rating 1:  5/10  Location - Side 1: Right  Location - Orientation 1: generalized  Location 1: knee  Pain Addressed 1: Pre-medicate for activity, Reposition, Distraction  Pain Rating Post-Intervention 1:  (pt did not rate)    Patients cultural, spiritual, [employer] conflicts given the current situation: no    Objective:     Communicated with: RN prior to session.  Patient found HOB elevated with cryotherapy, blood pressure cuff, FCD upon OT entry to room.    General Precautions: Standard, fall  Orthopedic Precautions: RLE weight bearing as tolerated  Braces: N/A  Respiratory Status: Room air    Occupational Performance:    Bed Mobility:    Patient completed Scooting/Bridging with stand by assistance  Patient completed Supine to Sit with stand by assistance    Functional Mobility/Transfers:  Patient completed Sit <> Stand Transfer with contact guard assistance  with  rolling walker   Verbal cues for hand placement  Patient completed Bed > Chair Transfer using Step Transfer technique with contact guard assistance with rolling walker  Patient completed Toilet Transfer to toilet (BSC over toilet) Step Transfer technique with contact guard assistance with  rolling walker  Functional Mobility: Pt engaged in functional mobility to simulate household/community distances EOB>toilet (BSC over toilet) and toilet>bedside chair with CGA and RW in order to maximize functional endurance and standing balance required for engagement in occupations of choice   No LOB or SOB noted  Verbal cues for sequencing, RW management, and for weightbearing status    Activities of Daily Living:  Grooming: supervision to wash hands and brush teeth at sink with RW  Upper Body Dressing: modified independence to don bra and overhead shirt sitting on chair  Lower Body Dressing: stand by assistance to thread b/l feet through pants and manage over hips and rear with RW; SBA to doff socks and don slip on shoes  Toileting: supervision as pt completed [facility]  sitting on toilet (BSC over toilet)    Cognitive/Visual Perceptual:  Cognitive/Psychosocial Skills:     -       Oriented to: Person, Place, Time, and Situation   -       Follows Commands/attention:Follows one-step commands  -       Safety awareness/insight to disability: intact   -       Mood/Affect/Coping skills/emotional control: Cooperative and Pleasant    Physical Exam:  Upper Extremity Range of Motion:     -       Right Upper Extremity: WFL  -       Left Upper Extremity: WFL  Upper Extremity Strength:    -       Right Upper Extremity: WFL  -       Left Upper Extremity: WFL    AMPAC 6 Click ADL:  AMPAC Total Score: 22    Treatment & Education:  -Pt educated to dress surgical site first and further dressing techniques s/p surgery  -Pt educated on hand placement for transfers  -Pt educated on RW placement for ADLs  -Pt educated on proper foot wear s/p surgery  -Pt educated on positioning of sx LE during performance of functional activities vs rest/sleep  -Pt educated on weightbearing and surgical precautions  -Pt educated to call for assistance and to transfer with hospital staff only  -Pt educated on role of OT and plan of care s/p surgery at [facility], white board updated     Patient left up in chair with all lines intact, call button in reach, and RN notified    GOALS:   Multidisciplinary Problems       Occupational Therapy Goals          Problem: Occupational Therapy    Goal Priority Disciplines Outcome Interventions   Occupational Therapy Goal     OT, PT/OT Ongoing, Progressing    Description: Goals to be met by: 5/25/23     Patient will increase functional independence with ADLs by performing:    UE Dressing with Modified [place].  LE Dressing with Modified [place].  Grooming while standing at sink with Modified [place].  Toileting from toilet/bedside commode with Modified [place] for hygiene and clothing management.   Toilet transfer to toilet/bedside commode with  Supervision and LRAD.                         History:     Past Medical History:   Diagnosis Date    Anxiety     Arthritis     Basal cell carcinoma 09/2016    right post auricular neck     Basal cell carcinoma of right forearm 04/19/2023    R lower forearm    Breast cancer 1992    right    Cataract     Fibromyalgia     History of measles as a child     Hyperlipidemia     Hypertension     Personal history of colonic polyps     Pneumonia     SCC (squamous cell carcinoma) 2015    R chest    SCC (squamous cell carcinoma) 2016    left medial shoulder    SCC (squamous cell carcinoma) 2017    left knee    SCC (squamous cell carcinoma) 2022    L upper chest, R upper arm KA types    Shingles     Squamous cell carcinoma 2015    right forearm    Thyroid disease     Vaginitis          Past Surgical History:   Procedure Laterality Date    ADENOIDECTOMY      ARTHROPLASTY, KNEE, TOTAL, USING COMPUTER-ASSISTED NAVIGATION Right 5/22/2023    Procedure: ARTHROPLASTY, KNEE, TOTAL, USING COMPUTER-ASSISTED NAVIGATION: QUYEN: RIGHT: MARTHA - IDANIA;  Surgeon: Milton Cobian III, MD;  Location: [facility] OR;  Service: Orthopedics;  Laterality: Right;    BREAST BIOPSY Left     Excisional bx, benign    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    CARPAL TUNNEL RELEASE Right 3/16/2021    Procedure: RELEASE, CARPAL TUNNEL;  Surgeon: Barbara Aldridge MD;  Location: [facility] OR;  Service: Orthopedics;  Laterality: Right;    CATARACT EXTRACTION W/  INTRAOCULAR LENS IMPLANT Bilateral     EPIDURAL STEROID INJECTION N/A 2/24/2023    Procedure: HEIDI C7-T1;  Surgeon: Andi Cisneros DO;  Location: [facility] OR;  Service: Pain Management;  Laterality: N/A;    EYE SURGERY      HAND SURGERY      INJECTION OF ANESTHETIC AGENT AROUND MEDIAL BRANCH NERVES INNERVATING CERVICAL FACET JOINT N/A 1/4/2022    Procedure: Cervical Medial Branch Block C5-6, C6-7 (No Sedation);  Surgeon: Himanshu Blackmon Jr., MD;  Location: [facility] PAIN MGT;  Service: Pain Management;   Laterality: N/A;    INJECTION OF ANESTHETIC AGENT AROUND MEDIAL BRANCH NERVES INNERVATING LUMBAR FACET JOINT Left 11/18/2021    Procedure: Cervical Medial Branch Block Left C5-6, C6-7 (IV Sedation);  Surgeon: Himanshu Blackmon Jr., MD;  Location: [facility] PAIN MGT;  Service: Pain Management;  Laterality: Left;    JOINT REPLACEMENT Right     ELZBIETA    KNEE ARTHROPLASTY Left 8/10/2020    Procedure: ARTHROPLASTY, KNEE-ADE NEXGEN/CEMENTED TIBIA;  Surgeon: Kalin Pacheco MD;  Location: [facility] OR;  Service: Orthopedics;  Laterality: Left;    RADIOFREQUENCY THERMAL COAGULATION OF MEDIAL BRANCH OF POSTERIOR RAMUS OF CERVICAL SPINAL NERVE Left 3/8/2022    Procedure: RADIOFREQUENCY THERMAL COAGULATION, NERVE, SPINAL, CERVICAL, LEFT C5-6 AND C6-7;  Surgeon: Himanshu Blackmon Jr., MD;  Location: [facility] PAIN T;  Service: Pain Management;  Laterality: Left;  NO PACEMAKER   ASA    thyriodectomy      partial    tonsillectomy      TONSILLECTOMY      TOTAL HIP ARTHROPLASTY      right    Yag  Left        Time Tracking:     OT Date of Treatment: 05/23/23  OT Start Time: 0825  OT Stop Time: 0901  OT Total Time (min): 36 min    Billable Minutes:Evaluation 10  Self Care/Home Management 26    5/23/2023

## 2023-05-23 NOTE — PROGRESS NOTES
Acute Pain Service and Perioperative Surgical Home Progress Note    HPI  Gerda Lai is a 89 y.o., female, s/p right TKA. No acute events overnight.    Interval history      Surgery:  Procedure(s) (LRB):  ARTHROPLASTY, KNEE, TOTAL, USING COMPUTER-ASSISTED NAVIGATION: QUYEN: RIGHT: MARTHA - IDANIA (Right)    Post Op Day #: 1      Problem List:    Active Hospital Problems    Diagnosis  POA    *Primary osteoarthritis of right knee [M17.11]  Yes     Moderate to severe DJD with some progression of joint space narrowing at the lateral compartment of the tibiofemoral joint space on the right.  Previously described left suprapatellar effusion is no longer present.  In addition, the previously described calcification above the left patella is also no longer seen.          Resolved Hospital Problems   No resolved problems to display.       Subjective:       General appearance of alert, oriented, no complaints   Pain with rest: 1    Numbers   Pain with movement: 1    Numbers   Side Effects    1. Pruritis No    2. Nausea No    3. Motor Blockade No, 0=Ability to raise lower extremities off bed    4. Sedation No, 1=awake and alert    Schedule Medications:    acetaminophen  1,000 mg Oral Q6H    amLODIPine  5 mg Oral Daily    aspirin  81 mg Oral BID    docusate sodium  100 mg Oral BID    famotidine  20 mg Oral BID    labetaloL  100 mg Oral BID    methocarbamoL  750 mg Oral TID    mupirocin  1 g Nasal BID    polyethylene glycol  17 g Oral Daily    pravastatin  80 mg Oral Daily    pregabalin  75 mg Oral QHS    senna-docusate 8.6-50 mg  1 tablet Oral BID    spironolactone  25 mg Oral Once per day on Mon Wed Fri        Continuous Infusions:   sodium chloride 0.9% 150 mL/hr at 05/22/23 2153        PRN Medications:  albuterol, bisacodyL, morphine, naloxone, ondansetron, oxyCODONE, oxyCODONE, prochlorperazine       Antibiotics:  Antibiotics (From admission, onward)      Start     Stop Route Frequency Ordered    05/22/23 2100   mupirocin 2 % ointment 1 g         05/27 2059 Nasl 2 times daily 05/22/23 1848               Objective:    Vital Signs (Most Recent):  Temp: 97.1 °F (36.2 °C) (05/23/23 0413)  Pulse: 60 (05/23/23 0413)  Resp: 16 (05/23/23 0413)  BP: (!) 152/68 (05/23/23 0413)  SpO2: 99 % (05/23/23 0413) Vital Signs Range (Last 24H):  Temp:  [97 °F (36.1 °C)-98.6 °F (37 °C)]   Pulse:  [51-63]   Resp:  [15-20]   BP: (130-173)/(60-88)   SpO2:  [93 %-100 %]          I & O (Last 24H):  Intake/Output Summary (Last 24 hours) at 5/23/2023 0530  Last data filed at 5/23/2023 0115  Gross per 24 hour   Intake 2040 ml   Output 1200 ml   Net 840 ml       Physical Exam:    GA: Alert, comfortable, no acute distress.   Pulmonary: Clear to auscultation. Normal RR.    Cardiac: regular rate and rhythm.   M/S: DF/PF/EHL     Laboratory: reviewed in chart  CBC:   Recent Labs     05/23/23 0421   HCT 33*       BMP: No results for input(s): NA, K, CO2, CL, BUN, CREATININE, GLU, MG, PHOS, CALCIUM in the last 72 hours.    No results for input(s): PT, INR, PROTIME, APTT in the last 72 hours.      \    Assessment:         Pain control adequate      Plan:     1) Pain:  Multimodal pain regimen ordered which includes acetaminophen, celecoxib, pregabalin, and prn oxycodone.  Will continue to monitor. Plan to discharge    2) HTN: home meds restarted.  Controled over night.    3) FEN/GI: Tolerating regular diet.     4) Dispo: Pt working well with PT/OT. Case management and SW following along for setting up home health and physical therapy. Plan to discharge home this am.          Evaluator Nathalie Yu NP

## 2023-05-24 PROCEDURE — 99232 PR SUBSEQUENT HOSPITAL CARE,LEVL II: ICD-10-PCS | Mod: ,,, | Performed by: ANESTHESIOLOGY

## 2023-05-24 PROCEDURE — 99900035 HC TECH TIME PER 15 MIN (STAT)

## 2023-05-24 PROCEDURE — 25000003 PHARM REV CODE 250

## 2023-05-24 PROCEDURE — 11000001 HC ACUTE MED/SURG PRIVATE ROOM

## 2023-05-24 PROCEDURE — 25000003 PHARM REV CODE 250: Performed by: PHYSICIAN ASSISTANT

## 2023-05-24 PROCEDURE — 25000003 PHARM REV CODE 250: Performed by: ANESTHESIOLOGY

## 2023-05-24 PROCEDURE — 97535 SELF CARE MNGMENT TRAINING: CPT

## 2023-05-24 PROCEDURE — 99232 SBSQ HOSP IP/OBS MODERATE 35: CPT | Mod: ,,, | Performed by: ANESTHESIOLOGY

## 2023-05-24 PROCEDURE — 97116 GAIT TRAINING THERAPY: CPT

## 2023-05-24 PROCEDURE — 94761 N-INVAS EAR/PLS OXIMETRY MLT: CPT

## 2023-05-24 PROCEDURE — 97530 THERAPEUTIC ACTIVITIES: CPT

## 2023-05-24 PROCEDURE — 97110 THERAPEUTIC EXERCISES: CPT

## 2023-05-24 RX ORDER — ACETAMINOPHEN 500 MG
1000 TABLET ORAL EVERY 8 HOURS
Status: DISCONTINUED | OUTPATIENT
Start: 2023-05-25 | End: 2023-05-26 | Stop reason: HOSPADM

## 2023-05-24 RX ADMIN — MUPIROCIN 1 G: 20 OINTMENT TOPICAL at 09:05

## 2023-05-24 RX ADMIN — PREGABALIN 50 MG: 50 CAPSULE ORAL at 09:05

## 2023-05-24 RX ADMIN — FAMOTIDINE 20 MG: 20 TABLET ORAL at 09:05

## 2023-05-24 RX ADMIN — METHOCARBAMOL 750 MG: 750 TABLET ORAL at 09:05

## 2023-05-24 RX ADMIN — DOCUSATE SODIUM 100 MG: 100 CAPSULE, LIQUID FILLED ORAL at 09:05

## 2023-05-24 RX ADMIN — ACETAMINOPHEN 1000 MG: 500 TABLET ORAL at 12:05

## 2023-05-24 RX ADMIN — LABETALOL HYDROCHLORIDE 100 MG: 100 TABLET, FILM COATED ORAL at 09:05

## 2023-05-24 RX ADMIN — SPIRONOLACTONE 25 MG: 25 TABLET, FILM COATED ORAL at 09:05

## 2023-05-24 RX ADMIN — PRAVASTATIN SODIUM 80 MG: 20 TABLET ORAL at 09:05

## 2023-05-24 RX ADMIN — AMLODIPINE BESYLATE 5 MG: 5 TABLET ORAL at 09:05

## 2023-05-24 RX ADMIN — ASPIRIN 81 MG: 81 TABLET, COATED ORAL at 09:05

## 2023-05-24 RX ADMIN — METHOCARBAMOL 750 MG: 750 TABLET ORAL at 04:05

## 2023-05-24 RX ADMIN — SENNOSIDES AND DOCUSATE SODIUM 1 TABLET: 50; 8.6 TABLET ORAL at 09:05

## 2023-05-24 RX ADMIN — POLYETHYLENE GLYCOL 3350 17 G: 17 POWDER, FOR SOLUTION ORAL at 09:05

## 2023-05-24 RX ADMIN — ACETAMINOPHEN 1000 MG: 500 TABLET ORAL at 05:05

## 2023-05-24 RX ADMIN — OXYCODONE HYDROCHLORIDE 5 MG: 5 TABLET ORAL at 11:05

## 2023-05-24 RX ADMIN — ACETAMINOPHEN 1000 MG: 500 TABLET ORAL at 02:05

## 2023-05-24 RX ADMIN — ALISKIREN HEMIFUMARATE 300 MG: 150 TABLET, FILM COATED ORAL at 09:05

## 2023-05-24 NOTE — PLAN OF CARE
[facility]  Discharge Reassessment    Primary Care Provider: Tomas Beltran MD    Expected Discharge Date: 5/25/2023    Reassessment (most recent)       Discharge Reassessment - 05/24/23 1138          Discharge Reassessment    Assessment Type Discharge Planning Reassessment     Did the patient's condition or plan change since previous assessment? No     Discharge Plan discussed with: Patient     Communicated LEONARDO with patient/caregiver Yes     Discharge Plan A Skilled Nursing Facility                     Patient to discharge to [facility] when medically stable. LEONARDO 5/25/23 at this time. Will continue to follow for needs.      Rafaela Delgadillo RNCM  Case Management  [facility]  [phone]

## 2023-05-24 NOTE — PLAN OF CARE
05/22/23 1954   QUILES Message   Medicare Outpatient and Observation Notification regarding financial responsibility Given to patient/caregiver   Date QUILES was signed 05/22/23   Time QUILES was signed 1954     Quiles form provided by [place] staff and signed on the above date and time.    Rafaela Delgadillo RNCM  Case Management  [facility]  [phone]

## 2023-05-24 NOTE — PLAN OF CARE
Problem: Adult Inpatient Plan of Care  Goal: Absence of Hospital-Acquired Illness or Injury  Intervention: Identify and Manage Fall Risk  Flowsheets (Taken 5/24/2023 1004)  Safety Promotion/Fall Prevention:   assistive device/personal item within reach   medications reviewed   lighting adjusted   instructed to call staff for mobility   Fall Risk reviewed with patient/family   in recliner, wheels locked     Problem: Adult Inpatient Plan of Care  Goal: Absence of Hospital-Acquired Illness or Injury  Intervention: Prevent and Manage VTE (Venous Thromboembolism) Risk  Flowsheets (Taken 5/24/2023 1004)  Activity Management: Ankle pumps - L1  VTE Prevention/Management: dorsiflexion/plantar flexion performed     Problem: Adult Inpatient Plan of Care  Goal: Absence of Hospital-Acquired Illness or Injury  Intervention: Prevent Infection  Flowsheets (Taken 5/24/2023 1004)  Infection Prevention: hand hygiene promoted     Problem: Adult Inpatient Plan of Care  Goal: Optimal Comfort and Wellbeing  Intervention: Monitor Pain and Promote Comfort  Flowsheets (Taken 5/24/2023 1004)  Pain Management Interventions:   care clustered   cold applied     Problem: Adult Inpatient Plan of Care  Goal: Optimal Comfort and Wellbeing  Intervention: Provide Person-Centered Care  Flowsheets (Taken 5/24/2023 1004)  Trust Relationship/Rapport:   questions answered   care explained   choices provided   questions encouraged   emotional support provided   empathic listening provided   thoughts/feelings acknowledged     Problem: Pain Acute  Goal: Acceptable Pain Control and Functional Ability  Intervention: Develop Pain Management Plan  Flowsheets (Taken 5/24/2023 1004)  Pain Management Interventions:   care clustered   cold applied     Problem: Pain Acute  Goal: Acceptable Pain Control and Functional Ability  Intervention: Prevent or Manage Pain  Flowsheets (Taken 5/24/2023 1004)  Medication Review/Management: medications reviewed     Problem: Pain  Acute  Goal: Acceptable Pain Control and Functional Ability  Intervention: Optimize Psychosocial Wellbeing  Flowsheets (Taken 5/24/2023 1004)  Supportive Measures: active listening utilized     Problem: Fall Injury Risk  Goal: Absence of Fall and Fall-Related Injury  Intervention: Identify and Manage Contributors  Flowsheets (Taken 5/24/2023 1004)  Medication Review/Management: medications reviewed     Problem: Fall Injury Risk  Goal: Absence of Fall and Fall-Related Injury  Intervention: Promote Injury-Free Environment  Flowsheets (Taken 5/24/2023 1004)  Safety Promotion/Fall Prevention:   assistive device/personal item within reach   medications reviewed   lighting adjusted   instructed to call staff for mobility   Fall Risk reviewed with patient/family   in recliner, wheels locked     Problem: Adjustment to Surgery (Knee Arthroplasty)  Goal: Optimal Coping  Intervention: Support Psychosocial Response to Surgery and Mobility Changes  Flowsheets (Taken 5/24/2023 1004)  Supportive Measures: active listening utilized     Problem: Bleeding (Knee Arthroplasty)  Goal: Absence of Bleeding  Intervention: Monitor and Manage Bleeding  Flowsheets (Taken 5/24/2023 1004)  Bleeding Management: dressing monitored     Problem: Functional Ability Impaired (Knee Arthroplasty)  Goal: Optimal Functional Ability  Intervention: Promote Optimal Functional Status  Flowsheets (Taken 5/24/2023 1004)  Activity Management: Ankle pumps - L1     Problem: Infection (Knee Arthroplasty)  Goal: Absence of Infection Signs and Symptoms  Intervention: Prevent or Manage Infection  Flowsheets (Taken 5/24/2023 1004)  Infection Prevention: hand hygiene promoted     Problem: Pain (Knee Arthroplasty)  Goal: Acceptable Pain Control  Intervention: Prevent or Manage Pain  Flowsheets (Taken 5/24/2023 1004)  Pain Management Interventions:   care clustered   cold applied     Problem: Anxiety  Goal: Anxiety Reduction or Resolution  Intervention: Promote Anxiety  Reduction  Flowsheets (Taken 5/24/2023 1004)  Supportive Measures: active listening utilized

## 2023-05-24 NOTE — PROGRESS NOTES
[facility])  Orthopedics  Progress Note    Patient Name: Gerda Lai  MRN: [id number]  Admission Date: 5/22/2023  Hospital Length of Stay: 2 days  Attending Provider: Milton Cobian III, MD  Primary Care Provider: Tomas Beltran MD  Follow-up For: Procedure(s) (LRB):  ARTHROPLASTY, KNEE, TOTAL, USING COMPUTER-ASSISTED NAVIGATION: QUYEN: RIGHT: MARTHA - TRIATHALON (Right)    Post-Operative Day: 2 Days Post-Op  Subjective:     Principal Problem:Primary osteoarthritis of right knee    Principal Orthopedic Problem: same, s/p R TKA 5/22    Interval History: No acute events overnight. Vitals with some slight hypertension overnight, but otherwise within normal limits and normalized this morning. Pain well controlled. Patient ambulated 50 feet with PT yesterday. Adequate urine output overnight.    Review of patient's allergies indicates:   Allergen Reactions    Sulfa (sulfonamide antibiotics) Rash    Clarithromycin Other (See Comments)     Weak, extreme fatigue. dizziness    Flexeril [cyclobenzaprine] Other (See Comments)     Dizziness    Iodine and iodide containing products     Lisinopril Other (See Comments)     Cough and sensation of throat swelling/?angioedema    Losartan Rash    Metoprolol Swelling     Tightness in throat    Spironolactone      Throat tightening    Tramadol Other (See Comments)     Dizzy and weak    Verapamil (bulk) Palpitations    Voltaren [diclofenac sodium] Other (See Comments)     Drops blood pressure       Current Facility-Administered Medications   Medication    acetaminophen tablet 1,000 mg    albuterol inhaler 1 puff    aliskiren tablet 300 mg    amLODIPine tablet 5 mg    aspirin EC tablet 81 mg    bisacodyL suppository 10 mg    docusate sodium capsule 100 mg    famotidine tablet 20 mg    labetaloL tablet 100 mg    methocarbamoL tablet 750 mg    morphine injection 2 mg    mupirocin 2 % ointment 1 g    naloxone 0.4 mg/mL injection 0.02 mg    ondansetron  "injection 4 mg    oxyCODONE immediate release tablet 5 mg    oxyCODONE immediate release tablet Tab 10 mg    polyethylene glycol packet 17 g    pravastatin tablet 80 mg    pregabalin capsule 50 mg    prochlorperazine injection Soln 5 mg    senna-docusate 8.6-50 mg per tablet 1 tablet    spironolactone tablet 25 mg     Facility-Administered Medications Ordered in Other Encounters   Medication    fentaNYL 50 mcg/mL injection 25 mcg    midazolam (VERSED) 1 mg/mL injection 2 mg     Objective:     Vital Signs (Most Recent):  Temp: 97.8 °F (36.6 °C) (05/24/23 0856)  Pulse: (!) 54 (05/24/23 1019)  Resp: 16 (05/24/23 1019)  BP: 139/64 (05/24/23 0856)  SpO2: 99 % (05/24/23 1019) Vital Signs (24h Range):  Temp:  [97 °F (36.1 °C)-98.2 °F (36.8 °C)] 97.8 °F (36.6 °C)  Pulse:  [54-68] 54  Resp:  [16-18] 16  SpO2:  [98 %-100 %] 99 %  BP: (116-159)/(52-76) 139/64     Weight: 61.2 kg (135 lb)  Height: 5' 3" (160 cm)  Body mass index is 23.91 kg/m².      Intake/Output Summary (Last 24 hours) at 5/24/2023 1028  Last data filed at 5/24/2023 0932  Gross per 24 hour   Intake 1030 ml   Output 1950 ml   Net -920 ml         Ortho/SPM Exam  Gen: NAD, sitting comfortably in bed  CV: regular rate  Resp: non-labored respirations    RLE:  Dressings clean, dry, and intact  Appropriate postoperative TTP  SILT Sa/Douglas/DP/SP/T  Motor intact EHL/FHL/TA/Gastroc  2+ DP, 2+ PT     Significant Labs: All pertinent labs within the past 24 hours have been reviewed.    Significant Imaging: I have reviewed and interpreted all pertinent imaging results/findings.    Assessment/Plan:     * Primary osteoarthritis of right knee  Gerda Lai is a 89 y.o. female s/p R TKA on 5/22/23 with Dr. Cobian. Doing well this morning.    Plan:  Pain control: multimodal  PT/OT: WBAT RLE  DVT PPx: ASA 81 mg BID  Abx: postop Ancef x 24 hours. Discharge on 7 days of cefadroxil 500 mg BID  HTN: home medications restarted    Dispo: to [facility] on 5/25            Adi " MD Lianna  Orthopedics  [facility]

## 2023-05-24 NOTE — PROGRESS NOTES
Acute Pain Service and Perioperative Surgical Home Progress Note    HPI  Gerda Lai is a 89 y.o., female, with HTN and CKD POD#2 s/p right TKA. No acute events overnight    Interval history      Surgery:  Procedure(s) (LRB):  ARTHROPLASTY, KNEE, TOTAL, USING COMPUTER-ASSISTED NAVIGATION: QUYEN: RIGHT: MARTHA - TRIATHALON (Right)    Post Op Day #: 1    Problem List:    Active Hospital Problems    Diagnosis  POA    *Primary osteoarthritis of right knee [M17.11]  Yes     Moderate to severe DJD with some progression of joint space narrowing at the lateral compartment of the tibiofemoral joint space on the right.  Previously described left suprapatellar effusion is no longer present.  In addition, the previously described calcification above the left patella is also no longer seen.          Resolved Hospital Problems   No resolved problems to display.       Subjective:       General appearance of alert, oriented, no complaints   Pain with rest: 1    Numbers   Pain with movement: 3    Numbers   Side Effects    1. Pruritis No    2. Nausea No    3. Motor Blockade No, 0=Ability to raise lower extremities off bed    4. Sedation No, 1=awake and alert    Schedule Medications:    [START ON 5/25/2023] acetaminophen  1,000 mg Oral Q8H    aliskiren  300 mg Oral Daily    amLODIPine  5 mg Oral Daily    aspirin  81 mg Oral BID    docusate sodium  100 mg Oral BID    famotidine  20 mg Oral BID    labetaloL  100 mg Oral BID    methocarbamoL  750 mg Oral TID    mupirocin  1 g Nasal BID    polyethylene glycol  17 g Oral Daily    pravastatin  80 mg Oral Daily    pregabalin  50 mg Oral QHS    senna-docusate 8.6-50 mg  1 tablet Oral BID    spironolactone  25 mg Oral Once per day on Mon Wed Fri        Continuous Infusions:       PRN Medications:  albuterol, bisacodyL, morphine, naloxone, ondansetron, oxyCODONE, oxyCODONE, prochlorperazine       Antibiotics:  Antibiotics (From admission, onward)      Start     Stop Route Frequency Ordered     05/22/23 2100  mupirocin 2 % ointment 1 g         05/27 2059 Nasl 2 times daily 05/22/23 1848               Objective:    Vital Signs (Most Recent):  Temp: 97.9 °F (36.6 °C) (05/24/23 1708)  Pulse: 64 (05/24/23 1708)  Resp: 18 (05/24/23 1708)  BP: (!) 158/67 (05/24/23 1708)  SpO2: 99 % (05/24/23 1708) Vital Signs Range (Last 24H):  Temp:  [97 °F (36.1 °C)-97.9 °F (36.6 °C)]   Pulse:  [54-64]   Resp:  [16-18]   BP: (133-159)/(60-70)   SpO2:  [98 %-100 %]          I & O (Last 24H):  Intake/Output Summary (Last 24 hours) at 5/24/2023 1847  Last data filed at 5/24/2023 1830  Gross per 24 hour   Intake 1020 ml   Output 1900 ml   Net -880 ml       Physical Exam:    GA: Alert, comfortable, no acute distress.   Pulmonary: Clear to auscultation. Normal RR.    Cardiac: regular rate and rhythm.      Laboratory: reviewed in chart  CBC:   Recent Labs     05/23/23  0421   HCT 33*       BMP: No results for input(s): NA, K, CO2, CL, BUN, CREATININE, GLU, MG, PHOS, CALCIUM in the last 72 hours.    No results for input(s): PT, INR, PROTIME, APTT in the last 72 hours.          Assessment:    Gerda Lai is a 89 y.o., female, with HTN and CKD POD#2 s/p right TKA. No acute events overnight     Pain control adequate    Plan:     1) Pain: Multimodal pain regimen ordered which includes acetaminophen, pregabalin, methocarbamol and prn oxycodone.  Will continue to monitor. Plan to discharge with Nimbus pain pump.   2) HTN- well controlled- continue home regimen at SNF   3) FEN/GI: Tolerating regular diet.     4) Dispo: Pt working well with PT/OT. Case management and SW following along for SNF placement.          Evaluator SARAI Mattson

## 2023-05-24 NOTE — PROGRESS NOTES
Acute Pain Service and Perioperative Surgical Home Progress Note    Interval history  Gerda Lai is a 89 y.o., female,     Surgery:  Procedure(s) (LRB):  ARTHROPLASTY, KNEE, TOTAL, USING COMPUTER-ASSISTED NAVIGATION: QUYEN: RIGHT: MARTHA - IDANIA (Right)    Post Op Day #: 2    Problem List:    Active Hospital Problems    Diagnosis  POA    *Primary osteoarthritis of right knee [M17.11]  Yes     Moderate to severe DJD with some progression of joint space narrowing at the lateral compartment of the tibiofemoral joint space on the right.  Previously described left suprapatellar effusion is no longer present.  In addition, the previously described calcification above the left patella is also no longer seen.          Resolved Hospital Problems   No resolved problems to display.       Subjective:       General appearance of alert, oriented, no complaints   Pain with rest: 1    Numbers   Pain with movement: 1    Numbers   Side Effects    1. Pruritis No    2. Nausea No    3. Motor Blockade Yes, 0=Ability to raise lower extremities off bed    4. Sedation Yes, 1=awake and alert    Schedule Medications:    acetaminophen  1,000 mg Oral Q6H    aliskiren  300 mg Oral Daily    amLODIPine  5 mg Oral Daily    aspirin  81 mg Oral BID    docusate sodium  100 mg Oral BID    famotidine  20 mg Oral BID    labetaloL  100 mg Oral BID    methocarbamoL  750 mg Oral TID    mupirocin  1 g Nasal BID    polyethylene glycol  17 g Oral Daily    pravastatin  80 mg Oral Daily    pregabalin  50 mg Oral QHS    senna-docusate 8.6-50 mg  1 tablet Oral BID    spironolactone  25 mg Oral Once per day on Mon Wed Fri        Continuous Infusions:       PRN Medications:  albuterol, bisacodyL, morphine, naloxone, ondansetron, oxyCODONE, oxyCODONE, prochlorperazine       Antibiotics:  Antibiotics (From admission, onward)      Start     Stop Route Frequency Ordered    05/22/23 2100  mupirocin 2 % ointment 1 g         05/27 2059 Nasl 2 times daily 05/22/23  1848               Objective:         Vital Signs (Most Recent):  Temp: 97.3 °F (36.3 °C) (05/24/23 0403)  Pulse: (!) 58 (05/24/23 0403)  Resp: 16 (05/24/23 0403)  BP: (!) 159/70 (05/24/23 0403)  SpO2: 100 % (05/24/23 0403) Vital Signs Range (Last 24H):  Temp:  [97 °F (36.1 °C)-98.2 °F (36.8 °C)]   Pulse:  [58-68]   Resp:  [16-18]   BP: (116-159)/(52-76)   SpO2:  [97 %-100 %]          I & O (Last 24H):  Intake/Output Summary (Last 24 hours) at 5/24/2023 0521  Last data filed at 5/24/2023 0302  Gross per 24 hour   Intake 1090 ml   Output 1850 ml   Net -760 ml       Physical Exam:    GA: Alert, comfortable, no acute distress.   Pulmonary: Clear to auscultation . Normal respiratory ratei.  Cardiac: regular rate and rhythm.          Laboratory: reviewed in chart  CBC:   Recent Labs     05/23/23  0421   HCT 33*       BMP: No results for input(s): NA, K, CO2, CL, BUN, CREATININE, GLU, MG, PHOS, CALCIUM in the last 72 hours.    No results for input(s): PT, INR, PROTIME, APTT in the last 72 hours.          Assessment:         Pain control adequate    Plan:  1) Pain: Multimodal pain regimen ordered which includes acetaminophen, celecoxib, pregabalin, and prn oxycodone.  Well controlled on current regimen.  Will continue to monitor.    2) HTN : continue home regimen   4) FEN/GI: Tolerating regular diet.     5) Dispo: Pt working well with PT/OT. Case management and SW following along for setting up home health and physical therapy. Plan to discharge home this SNF.              Evaluator Anna Wilson PA-C

## 2023-05-24 NOTE — PLAN OF CARE
[facility]  Discharge Assessment    Primary Care Provider: Tomas Beltran MD     Discharge Assessment (most recent)       BRIEF DISCHARGE ASSESSMENT - 05/24/23 1143          Discharge Planning    Assessment Type Discharge Planning Brief Assessment     Resource/Environmental Concerns none     Support Systems Family members     Equipment Currently Used at Home bedside commode;walker, rolling;rollator;shower chair;cane, straight;cane, quad     Current Living Arrangements home     Patient/Family Anticipates Transition to inpatient rehabilitation facility     Patient/Family Anticipated Services at Transition skilled nursing     DME Needed Upon Discharge  none     Discharge Plan A Skilled Nursing Facility                         Living Environment: Patient lives alone in a single story home with 1 step to enter.   Prior Level of Function: Prior to admission, patient was modified independent with ADLs using rolling walker for mobility. She still drives.  Equipment at Home: bedside commode, walker, rolling, rollator, shower chair, cane, quad, cane, straight  Assistance Upon Discharge:  niece        Assessment completed using the medical record.  Rafaela XIAO  Case Management  [facility]  [phone]

## 2023-05-24 NOTE — PT/OT/SLP PROGRESS
"Physical Therapy Treatment    Patient Name:  Gerda Lai   MRN:  [id number]    Recommendations:     Discharge Recommendations: nursing facility, skilled  Discharge Equipment Recommendations: none  Barriers to discharge: Decreased caregiver support    Assessment:     Gerda Lai is a 89 y.o. female admitted with a medical diagnosis of Primary osteoarthritis of right knee.  She presents with the following impairments/functional limitations: weakness, impaired functional mobility, gait instability, impaired balance, decreased lower extremity function, pain, decreased ROM, orthopedic precautions. Patient tolerated PT session well. Patient ambulated 60ft with RW and contact guard assistance. No LOB or SOB noted. Patient ascended/descended 2" curb step with RW and contact guard assistance. Patient performed R LE therex x10 reps. Patient would benefit from SNF placement at discharge due to not having any support at home.     Rehab Prognosis: Good; patient would benefit from acute skilled PT services to address these deficits and reach maximum level of function.    Recent Surgery: Procedure(s) (LRB):  ARTHROPLASTY, KNEE, TOTAL, USING COMPUTER-ASSISTED NAVIGATION: QUYEN: RIGHT: MARTHA - IDANIA (Right) 2 Days Post-Op    Plan:     During this hospitalization, patient to be seen daily to address the identified rehab impairments via gait training, therapeutic activities, therapeutic exercises and progress toward the following goals:    Plan of Care Expires:  06/23/23    Subjective     Chief Complaint: Right knee pain with activity.   Patient/Family Comments/goals: To get back to being independent and driving.   Pain/Comfort:  Pain Rating 1:  (yes but did not rate with number)  Location - Side 1: Right  Location 1: knee  Pain Addressed 1: Pre-medicate for activity, Reposition, Distraction      Objective:     Communicated with RN prior to session.  Patient found up in chair with cryotherapy upon PT entry to room. " Weight Management Medical Nutrition Assessment  Nissa Hernandez was here today for her 10 wk VLCD follow-up visit   Today she weighs 162 4 lbs giving her a loss of 3 lbs in the last 2 weeks and a total of 25 7 since starting the program   She continues to tolerate product with out difficultly and has no constipation  She reports that her weight loss had "stalled" last week, and based on our e-mail conversation, she stopped her cardio workouts, and just stretched and used lite weights (see phone note)  She plans to continue for the final 2 weeks and then will transition to a partial meal plan       Patient seen by Medical Provider in past 6 months:  yes ( she saw Dr Dennise Reyes for her initial visit )  Requested to schedule appointment with Medical Provider: No        Anthropometric Measurements  Start Weight (#): 188 1 lbs  Current Weight (#): 163 4 lbs  TBW % Change from start weight: 14 %  Ideal Body Weight (#):135 lbs  Goal Weight (#): 150     Weight Loss History  Previous weight loss attempts: Commercial Programs (Weight Watchers, Neohapsis, etc )  Counseling with  MD     Food and Nutrition Related History  Wake up: 5:30 am         Bed Time: 11 pm     Food Recall  Breakfast:shake            Snack:  Lunch: shake/pudding  Snack: bar  Dinner: shake  Snack:         Beverages: water  Volume of beverage intake: 80 +      Weekends: Same  Cravings: sweets  Trouble area of day: after dinner/early evening      Frequency of Eating out: irregularly  Food restrictions:none  Cooking: self   Food Shopping: self     Physical Activity Intake  Activity:Walking and Aerobics  Frequency:occasionally  Physical limitations/barriers to exercise: none     Estimated Needs  Energy     [employer] Energy Needs:  BMR : 9730   6# loss weekly sedentary:  1179        1-2# loss weekly lightly active: 923 - 1423  Maintenance calories for sedentary activity level: 1689  Protein: 74 - 92     (1 2-1 5g/kg IBW)  Fluid: 72   (35mL/kg IBW)     Nutrition "    General Precautions: Standard, fall  Orthopedic Precautions: RLE weight bearing as tolerated  Braces: N/A  Respiratory Status: Room air     Functional Mobility:  Transfers:     Sit to Stand:  contact guard assistance with rolling walker x2 from bedside chair   Gait: Patient ambulated 60ft with Rolling Walker and contact guard assistance. Patient demonstrated decreased rupinder and decreased step length during gait due to pain, decreased ROM, and decreased strength.  Stairs:  Patient ascended/descended 2" curb step with RW and contact guard assistance.      AM-PAC 6 CLICK MOBILITY  Turning over in bed (including adjusting bedclothes, sheets and blankets)?: 3  Sitting down on and standing up from a chair with arms (e.g., wheelchair, bedside commode, etc.): 3  Moving from lying on back to sitting on the side of the bed?: 3  Moving to and from a bed to a chair (including a wheelchair)?: 3  Need to walk in hospital room?: 3  Climbing 3-5 steps with a railing?: 2  Basic Mobility Total Score: 17       Treatment & Education:  Patient educated in and performed R LE exercises x10 reps for ankle pumps, quad set, glute set, SAQ over bolster, heel slides, hip abd/add, and LAQ.     Patient educated in:  -PT role and POC  -safety with transfers including hand placement  -gait sequencing and RW management  -OOB activity to maximize recovery including ambulating with nursing staff assistance and RW while in the hospital   -curb training  -HEP for therex at home with handout provided         Patient left up in chair with all lines intact, call button in reach, and RN notified.    GOALS:   Multidisciplinary Problems       Physical Therapy Goals          Problem: Physical Therapy    Goal Priority Disciplines Outcome Goal Variances Interventions   Physical Therapy Goal     PT, PT/OT Ongoing, Progressing     Description: Goals to be met by: 2023    Patient will increase functional independence with mobility by performin. " Diagnosis  Yes;    Overweight/obesity  related to Excess energy intake as evidenced by  BMI more than normative standard for age and sex (overweight 25-29  9)     Nutrition Intervention     Nutrition Prescription  Calories:760   Protein: 96  Fluid: 80     Meal Plan (Jaime/Pro/Carb)  Breakfast:  200/27/10   Meal replacement  Snack:  Lunch: 200/27/10   Meal replacement  Snack:  Dinner: 200/27/10   Meal replacement  Snack: 160/15/13  Bar     Nutrition Education:    Calorie controlled menu  Lean protein food choices  Healthy snack options  Food journaling tips        Nutrition Counseling:  Strategies: meal planning, portion sizes, healthy snack choices, hydration, fiber intake, protein intake, exercise, food journal        Monitoring and Evaluation:  Evaluation criteria:  Energy Intake  Meet protein needs  Maintain adequate hydration  Monitor weekly weight  Meal planning/preparation  Food journal   Decreased portions at mealtimes and snacks  Physical activity      Barriers to learning:none  Readiness to change: Action:  (Changing behavior)  Comprehension: good  Expected Compliance: good    Supine to sit with supervision  2. Sit to stand transfer with Supervision  3. Gait x150 feet with Supervision using Rolling Walker  4. Ascend/Descend 1 curb step with Stand by assistance using Rolling Walker  5. Lower extremity exercise program x30 reps per handout, with supervision                            Time Tracking:     PT Received On: 05/24/23  PT Start Time: 0933     PT Stop Time: 0958  PT Total Time (min): 25 min     Billable Minutes: Gait Training 15 and Therapeutic Exercise 10    Treatment Type: Treatment  PT/PTA: PT     Number of PTA visits since last PT visit: 0     05/24/2023

## 2023-05-24 NOTE — PT/OT/SLP PROGRESS
Occupational Therapy   Treatment    Name: Gerda Lai  MRN: [id number]  Admitting Diagnosis:  Primary osteoarthritis of right knee  2 Days Post-Op    Recommendations:     Discharge Recommendations: nursing facility, skilled  Discharge Equipment Recommendations:  none  Barriers to discharge:  Decreased caregiver support    Assessment:     Gerda Lai is a 89 y.o. female with a medical diagnosis of Primary osteoarthritis of right knee. Patient presents with the following deficits affecting function: weakness, impaired endurance, impaired self care skills, impaired functional mobility, pain, decreased safety awareness, decreased lower extremity function, decreased ROM, orthopedic precautions. Patient participated in OT session with good participation and motivation to progress. She performed supine>sit with SPV, UBD with modified independence, LBD with SPV, toileting with SPV, and toilet t/f with CGA and RW. Patient required verbal cueing throughout for RW management and safety with transfers. She would continue to benefit from acute skilled OT to address established goals. Once medically stable, OT recommending SNF in order to maximize independence with functional activities, reduce caregiver burden, and facilitate safe discharge.     Rehab Prognosis:  Good; patient would benefit from acute skilled OT services to address these deficits and reach maximum level of function.       Plan:     Patient to be seen daily to address the above listed problems via self-care/home management, therapeutic activities, therapeutic exercises  Plan of Care Expires: 05/25/23  Plan of Care Reviewed with: patient    Subjective     Chief Complaint: decreased endurance  Patient/Family Comments/goals: to get better  Pain/Comfort:  Pain Rating 1: 0/10 (at rest)  Location - Side 1: Right  Location - Orientation 1: generalized  Location 1: knee  Pain Addressed 1: Pre-medicate for activity, Reposition, Distraction  Pain Rating  Post-Intervention 1: 2/10    Objective:     Communicated with: Gail CHAHAL prior to session.  Patient found HOB elevated with cryotherapy, FCD upon OT entry to room.    General Precautions: Standard, fall    Orthopedic Precautions:RLE weight bearing as tolerated  Braces: N/A  Respiratory Status: Room air     Occupational Performance:     Bed Mobility:    Patient completed Scooting/Bridging with supervision  Patient completed Supine to Sit with supervision     Functional Mobility/Transfers:  Patient completed Sit <> Stand Transfer with contact guard assistance  with  rolling walker   Patient completed Bed <> Chair Transfer using Step Transfer technique with contact guard assistance with rolling walker  Patient completed Toilet Transfer Step Transfer technique with contact guard assistance with  rolling walker  Functional Mobility: Pt engaged in functional mobility to simulate household/community distances EOB>toilet (BSC over toilet) and toilet>bedside chair with CGA and RW in order to maximize functional endurance and standing balance required for engagement in occupations of choice   No LOB or SOB noted  Patient required increased cueing for sequencing, RW management, and to widen EZ in order to increased safety with transfers     Activities of Daily Living:  Grooming: supervision to wash hands, brush teeth, and wash face at sink with RW  Upper Body Dressing: modified independence to don overhead shirt sitting on chair  Lower Body Dressing: supervision to thread b/l feet through shorts and above hips with RW; SPV to don/doff socks and don shoes  Toileting: supervision while patient completed pericare on toilet (BSC over toilet)      [facility] 6 Click ADL: 22    Treatment & Education:  -Pt educated to dress surgical site first and further dressing techniques s/p surgery  -Pt educated on hand placement for transfers  -Pt educated on RW placement for ADLs  -Pt educated on proper foot wear s/p surgery  -Pt educated on  positioning of sx LE during performance of functional activities vs rest/sleep  -Pt educated on weightbearing and surgical precautions with pt verbalizing 1/1 correctly  -Pt educated to call for assistance and to transfer with hospital staff only  -Pt educated on role of OT and plan of care s/p surgery at [facility], white board updated     Patient left up in chair with all lines intact, call button in reach, and RN notified    GOALS:   Multidisciplinary Problems       Occupational Therapy Goals          Problem: Occupational Therapy    Goal Priority Disciplines Outcome Interventions   Occupational Therapy Goal     OT, PT/OT Ongoing, Progressing    Description: Goals to be met by: 5/25/23     Patient will increase functional independence with ADLs by performing:    UE Dressing with Modified [place].  LE Dressing with Modified [place].  Grooming while standing at sink with Modified [place].  Toileting from toilet/bedside commode with Modified [place] for hygiene and clothing management.   Toilet transfer to toilet/bedside commode with Supervision and LRAD.                         Time Tracking:     OT Date of Treatment: 05/24/23  OT Start Time: 0814  OT Stop Time: 0850  OT Total Time (min): 36 min    Billable Minutes:Self Care/Home Management 22  Therapeutic Activity 14    OT/KATRINA: OT          5/24/2023

## 2023-05-24 NOTE — SUBJECTIVE & OBJECTIVE
Principal Problem:Primary osteoarthritis of right knee    Principal Orthopedic Problem: same, s/p R TKA 5/22    Interval History: No acute events overnight. Vitals with some slight hypertension overnight, but otherwise within normal limits and normalized this morning. Pain well controlled. Patient ambulated 50 feet with PT yesterday. Adequate urine output overnight.    Review of patient's allergies indicates:   Allergen Reactions    Sulfa (sulfonamide antibiotics) Rash    Clarithromycin Other (See Comments)     Weak, extreme fatigue. dizziness    Flexeril [cyclobenzaprine] Other (See Comments)     Dizziness    Iodine and iodide containing products     Lisinopril Other (See Comments)     Cough and sensation of throat swelling/?angioedema    Losartan Rash    Metoprolol Swelling     Tightness in throat    Spironolactone      Throat tightening    Tramadol Other (See Comments)     Dizzy and weak    Verapamil (bulk) Palpitations    Voltaren [diclofenac sodium] Other (See Comments)     Drops blood pressure       Current Facility-Administered Medications   Medication    acetaminophen tablet 1,000 mg    albuterol inhaler 1 puff    aliskiren tablet 300 mg    amLODIPine tablet 5 mg    aspirin EC tablet 81 mg    bisacodyL suppository 10 mg    docusate sodium capsule 100 mg    famotidine tablet 20 mg    labetaloL tablet 100 mg    methocarbamoL tablet 750 mg    morphine injection 2 mg    mupirocin 2 % ointment 1 g    naloxone 0.4 mg/mL injection 0.02 mg    ondansetron injection 4 mg    oxyCODONE immediate release tablet 5 mg    oxyCODONE immediate release tablet Tab 10 mg    polyethylene glycol packet 17 g    pravastatin tablet 80 mg    pregabalin capsule 50 mg    prochlorperazine injection Soln 5 mg    senna-docusate 8.6-50 mg per tablet 1 tablet    spironolactone tablet 25 mg     Facility-Administered Medications Ordered in Other Encounters   Medication    fentaNYL 50 mcg/mL injection 25 mcg    midazolam (VERSED) 1 mg/mL  "injection 2 mg     Objective:     Vital Signs (Most Recent):  Temp: 97.8 °F (36.6 °C) (05/24/23 0856)  Pulse: (!) 54 (05/24/23 1019)  Resp: 16 (05/24/23 1019)  BP: 139/64 (05/24/23 0856)  SpO2: 99 % (05/24/23 1019) Vital Signs (24h Range):  Temp:  [97 °F (36.1 °C)-98.2 °F (36.8 °C)] 97.8 °F (36.6 °C)  Pulse:  [54-68] 54  Resp:  [16-18] 16  SpO2:  [98 %-100 %] 99 %  BP: (116-159)/(52-76) 139/64     Weight: 61.2 kg (135 lb)  Height: 5' 3" (160 cm)  Body mass index is 23.91 kg/m².      Intake/Output Summary (Last 24 hours) at 5/24/2023 1028  Last data filed at 5/24/2023 0932  Gross per 24 hour   Intake 1030 ml   Output 1950 ml   Net -920 ml          Ortho/SPM Exam  Gen: NAD, sitting comfortably in bed  CV: regular rate  Resp: non-labored respirations    RLE:  Dressings clean, dry, and intact  Appropriate postoperative TTP  SILT Sa/Douglas/DP/SP/T  Motor intact EHL/FHL/TA/Gastroc  2+ DP, 2+ PT     Significant Labs: All pertinent labs within the past 24 hours have been reviewed.    Significant Imaging: I have reviewed and interpreted all pertinent imaging results/findings.  "

## 2023-05-25 PROCEDURE — 99900035 HC TECH TIME PER 15 MIN (STAT)

## 2023-05-25 PROCEDURE — 99232 PR SUBSEQUENT HOSPITAL CARE,LEVL II: ICD-10-PCS | Mod: ,,, | Performed by: ANESTHESIOLOGY

## 2023-05-25 PROCEDURE — 97110 THERAPEUTIC EXERCISES: CPT

## 2023-05-25 PROCEDURE — 11000001 HC ACUTE MED/SURG PRIVATE ROOM

## 2023-05-25 PROCEDURE — 97530 THERAPEUTIC ACTIVITIES: CPT

## 2023-05-25 PROCEDURE — 99232 SBSQ HOSP IP/OBS MODERATE 35: CPT | Mod: ,,, | Performed by: ANESTHESIOLOGY

## 2023-05-25 PROCEDURE — 25000003 PHARM REV CODE 250: Performed by: PHYSICIAN ASSISTANT

## 2023-05-25 PROCEDURE — 25000003 PHARM REV CODE 250

## 2023-05-25 PROCEDURE — 94761 N-INVAS EAR/PLS OXIMETRY MLT: CPT

## 2023-05-25 PROCEDURE — 25000003 PHARM REV CODE 250: Performed by: ANESTHESIOLOGY

## 2023-05-25 PROCEDURE — 97116 GAIT TRAINING THERAPY: CPT

## 2023-05-25 PROCEDURE — 97535 SELF CARE MNGMENT TRAINING: CPT

## 2023-05-25 RX ADMIN — LABETALOL HYDROCHLORIDE 100 MG: 100 TABLET, FILM COATED ORAL at 08:05

## 2023-05-25 RX ADMIN — POLYETHYLENE GLYCOL 3350 17 G: 17 POWDER, FOR SOLUTION ORAL at 08:05

## 2023-05-25 RX ADMIN — METHOCARBAMOL 750 MG: 750 TABLET ORAL at 09:05

## 2023-05-25 RX ADMIN — ACETAMINOPHEN 1000 MG: 500 TABLET ORAL at 02:05

## 2023-05-25 RX ADMIN — BISACODYL 10 MG: 10 SUPPOSITORY RECTAL at 09:05

## 2023-05-25 RX ADMIN — SENNOSIDES AND DOCUSATE SODIUM 1 TABLET: 50; 8.6 TABLET ORAL at 08:05

## 2023-05-25 RX ADMIN — FAMOTIDINE 20 MG: 20 TABLET ORAL at 09:05

## 2023-05-25 RX ADMIN — AMLODIPINE BESYLATE 5 MG: 5 TABLET ORAL at 08:05

## 2023-05-25 RX ADMIN — MUPIROCIN 1 G: 20 OINTMENT TOPICAL at 09:05

## 2023-05-25 RX ADMIN — ACETAMINOPHEN 1000 MG: 500 TABLET ORAL at 05:05

## 2023-05-25 RX ADMIN — FAMOTIDINE 20 MG: 20 TABLET ORAL at 08:05

## 2023-05-25 RX ADMIN — MUPIROCIN 1 G: 20 OINTMENT TOPICAL at 08:05

## 2023-05-25 RX ADMIN — METHOCARBAMOL 750 MG: 750 TABLET ORAL at 02:05

## 2023-05-25 RX ADMIN — ACETAMINOPHEN 1000 MG: 500 TABLET ORAL at 09:05

## 2023-05-25 RX ADMIN — PREGABALIN 50 MG: 50 CAPSULE ORAL at 09:05

## 2023-05-25 RX ADMIN — SENNOSIDES AND DOCUSATE SODIUM 1 TABLET: 50; 8.6 TABLET ORAL at 09:05

## 2023-05-25 RX ADMIN — METHOCARBAMOL 750 MG: 750 TABLET ORAL at 08:05

## 2023-05-25 RX ADMIN — ASPIRIN 81 MG: 81 TABLET, COATED ORAL at 09:05

## 2023-05-25 RX ADMIN — PRAVASTATIN SODIUM 80 MG: 20 TABLET ORAL at 08:05

## 2023-05-25 RX ADMIN — ASPIRIN 81 MG: 81 TABLET, COATED ORAL at 08:05

## 2023-05-25 RX ADMIN — DOCUSATE SODIUM 100 MG: 100 CAPSULE, LIQUID FILLED ORAL at 08:05

## 2023-05-25 RX ADMIN — LABETALOL HYDROCHLORIDE 100 MG: 100 TABLET, FILM COATED ORAL at 09:05

## 2023-05-25 RX ADMIN — ALISKIREN HEMIFUMARATE 300 MG: 150 TABLET, FILM COATED ORAL at 08:05

## 2023-05-25 NOTE — PT/OT/SLP PROGRESS
"Physical Therapy Treatment    Patient Name:  Gerda Lai   MRN:  [id number]    Recommendations:     Discharge Recommendations: nursing facility, skilled  Discharge Equipment Recommendations: none  Barriers to discharge: Decreased caregiver support    Assessment:     Gerda Lai is a 89 y.o. female admitted with a medical diagnosis of Primary osteoarthritis of right knee.  She presents with the following impairments/functional limitations: weakness, impaired functional mobility, gait instability, impaired balance, decreased lower extremity function, pain, decreased ROM, orthopedic precautions. Patient tolerated PT session well. Patient ambulated 70ft with RW and contact guard assistance. No LOB or SOB noted. Patient ascended/descended 6" curb step with RW and minimal assistance. Patient performed R LE therex x10 reps. Patient would benefit from SNF placement in order to get back to being independent prior to discharging home alone.     Rehab Prognosis: Good; patient would benefit from acute skilled PT services to address these deficits and reach maximum level of function.    Recent Surgery: Procedure(s) (LRB):  ARTHROPLASTY, KNEE, TOTAL, USING COMPUTER-ASSISTED NAVIGATION: QUYEN: RIGHT: MARTHA - IDANIA (Right) 3 Days Post-Op    Plan:     During this hospitalization, patient to be seen daily to address the identified rehab impairments via gait training, therapeutic activities, therapeutic exercises and progress toward the following goals:    Plan of Care Expires:  06/23/23    Subjective     Chief Complaint: Right knee pain. Sleepy.   Patient/Family Comments/goals: To walk without pain.   Pain/Comfort:  Pain Rating 1: 2/10  Location - Side 1: Right  Location 1: knee  Pain Addressed 1: Pre-medicate for activity, Reposition, Distraction      Objective:     Communicated with RN prior to session.  Patient found up in chair with cryotherapy upon PT entry to room.     General Precautions: Standard, fall  Orthopedic " "Precautions: RLE weight bearing as tolerated  Braces: N/A  Respiratory Status: Room air     Functional Mobility:  Transfers:     Sit to Stand:  contact guard assistance with rolling walker x2 from bedside chair   Gait: Patient ambulated 70ft and 20ft with Rolling Walker and contact guard assistance using 3-point gait. Patient demonstrated decreased rupinder and decreased step length during gait due to pain, decreased ROM, and decreased strength.  Stairs:  Patient ascended/descended 6" curb step with RW and minimal assistance.      AM-PAC 6 CLICK MOBILITY  Turning over in bed (including adjusting bedclothes, sheets and blankets)?: 4  Sitting down on and standing up from a chair with arms (e.g., wheelchair, bedside commode, etc.): 4  Moving from lying on back to sitting on the side of the bed?: 4  Moving to and from a bed to a chair (including a wheelchair)?: 4  Need to walk in hospital room?: 4  Climbing 3-5 steps with a railing?: 3  Basic Mobility Total Score: 23       Treatment & Education:  Patient educated in and performed R LE exercises x10 reps for ankle pumps, quad set, glute set, and LAQ.     Patient educated in:  -PT role and POC  -safety with transfers including hand placement  -gait sequencing and RW management  -OOB activity to maximize recovery including ambulating with nursing staff assistance and RW while in the hospital  -curb training  -HEP for therex at home with handout provided       Patient left up in chair with all lines intact, call button in reach, and RN notified.    GOALS:   Multidisciplinary Problems       Physical Therapy Goals          Problem: Physical Therapy    Goal Priority Disciplines Outcome Goal Variances Interventions   Physical Therapy Goal     PT, PT/OT Ongoing, Progressing     Description: Goals to be met by: 2023    Patient will increase functional independence with mobility by performin. Supine to sit with supervision  2. Sit to stand transfer with Supervision  3. " Gait x150 feet with Supervision using Rolling Walker  4. Ascend/Descend 1 curb step with Stand by assistance using Rolling Walker  5. Lower extremity exercise program x30 reps per handout, with supervision                            Time Tracking:     PT Received On: 05/25/23  PT Start Time: 1144     PT Stop Time: 1210  PT Total Time (min): 26 min     Billable Minutes: Gait Training 15 and Therapeutic Exercise 11    Treatment Type: Treatment  PT/PTA: PT     Number of PTA visits since last PT visit: 0     05/25/2023

## 2023-05-25 NOTE — SUBJECTIVE & OBJECTIVE
Principal Problem:Primary osteoarthritis of right knee    Principal Orthopedic Problem: same, s/p R TKA 5/22    Interval History: No acute events overnight. Vitals with some mild hypertension overnight. Pain well controlled. Working well with therapy. Adequate urine output overnight.    Review of patient's allergies indicates:   Allergen Reactions    Sulfa (sulfonamide antibiotics) Rash    Clarithromycin Other (See Comments)     Weak, extreme fatigue. dizziness    Flexeril [cyclobenzaprine] Other (See Comments)     Dizziness    Iodine and iodide containing products     Lisinopril Other (See Comments)     Cough and sensation of throat swelling/?angioedema    Losartan Rash    Metoprolol Swelling     Tightness in throat    Spironolactone      Throat tightening    Tramadol Other (See Comments)     Dizzy and weak    Verapamil (bulk) Palpitations    Voltaren [diclofenac sodium] Other (See Comments)     Drops blood pressure       Current Facility-Administered Medications   Medication    acetaminophen tablet 1,000 mg    albuterol inhaler 1 puff    aliskiren tablet 300 mg    amLODIPine tablet 5 mg    aspirin EC tablet 81 mg    bisacodyL suppository 10 mg    docusate sodium capsule 100 mg    famotidine tablet 20 mg    labetaloL tablet 100 mg    methocarbamoL tablet 750 mg    morphine injection 2 mg    mupirocin 2 % ointment 1 g    naloxone 0.4 mg/mL injection 0.02 mg    ondansetron injection 4 mg    oxyCODONE immediate release tablet 5 mg    oxyCODONE immediate release tablet Tab 10 mg    polyethylene glycol packet 17 g    pravastatin tablet 80 mg    pregabalin capsule 50 mg    prochlorperazine injection Soln 5 mg    senna-docusate 8.6-50 mg per tablet 1 tablet    spironolactone tablet 25 mg     Facility-Administered Medications Ordered in Other Encounters   Medication    fentaNYL 50 mcg/mL injection 25 mcg    midazolam (VERSED) 1 mg/mL injection 2 mg     Objective:     Vital Signs (Most Recent):  Temp: 98.3 °F (36.8 °C)  "(05/25/23 0428)  Pulse: 62 (05/25/23 0428)  Resp: 20 (05/25/23 0428)  BP: (!) 159/69 (05/25/23 0428)  SpO2: 97 % (05/25/23 0428) Vital Signs (24h Range):  Temp:  [97 °F (36.1 °C)-98.3 °F (36.8 °C)] 98.3 °F (36.8 °C)  Pulse:  [54-69] 62  Resp:  [16-20] 20  SpO2:  [97 %-100 %] 97 %  BP: (133-159)/(60-79) 159/69     Weight: 61.2 kg (135 lb)  Height: 5' 3" (160 cm)  Body mass index is 23.91 kg/m².      Intake/Output Summary (Last 24 hours) at 5/25/2023 0629  Last data filed at 5/24/2023 2320  Gross per 24 hour   Intake 1420 ml   Output 900 ml   Net 520 ml          Ortho/SPM Exam  Gen: NAD, sitting comfortably in bed  CV: regular rate  Resp: non-labored respirations    RLE:  Dressings clean, dry, and intact  Appropriate postoperative TTP  SILT Sa/Douglas/DP/SP/T  Motor intact EHL/FHL/TA/Gastroc  2+ DP, 2+ PT     Significant Labs: All pertinent labs within the past 24 hours have been reviewed.    Significant Imaging: I have reviewed and interpreted all pertinent imaging results/findings.  "

## 2023-05-25 NOTE — PROGRESS NOTES
[facility])  Orthopedics  Progress Note    Patient Name: Gerda Lai  MRN: [id number]  Admission Date: 5/22/2023  Hospital Length of Stay: 3 days  Attending Provider: Milton Cobian III, MD  Primary Care Provider: Tomas Beltran MD  Follow-up For: Procedure(s) (LRB):  ARTHROPLASTY, KNEE, TOTAL, USING COMPUTER-ASSISTED NAVIGATION: QUYEN: RIGHT: MARTHA - TRIATHALON (Right)    Post-Operative Day: 3 Days Post-Op  Subjective:     Principal Problem:Primary osteoarthritis of right knee    Principal Orthopedic Problem: same, s/p R TKA 5/22    Interval History: No acute events overnight. Vitals with some mild hypertension overnight. Pain well controlled. Working well with therapy. Adequate urine output overnight.    Review of patient's allergies indicates:   Allergen Reactions    Sulfa (sulfonamide antibiotics) Rash    Clarithromycin Other (See Comments)     Weak, extreme fatigue. dizziness    Flexeril [cyclobenzaprine] Other (See Comments)     Dizziness    Iodine and iodide containing products     Lisinopril Other (See Comments)     Cough and sensation of throat swelling/?angioedema    Losartan Rash    Metoprolol Swelling     Tightness in throat    Spironolactone      Throat tightening    Tramadol Other (See Comments)     Dizzy and weak    Verapamil (bulk) Palpitations    Voltaren [diclofenac sodium] Other (See Comments)     Drops blood pressure       Current Facility-Administered Medications   Medication    acetaminophen tablet 1,000 mg    albuterol inhaler 1 puff    aliskiren tablet 300 mg    amLODIPine tablet 5 mg    aspirin EC tablet 81 mg    bisacodyL suppository 10 mg    docusate sodium capsule 100 mg    famotidine tablet 20 mg    labetaloL tablet 100 mg    methocarbamoL tablet 750 mg    morphine injection 2 mg    mupirocin 2 % ointment 1 g    naloxone 0.4 mg/mL injection 0.02 mg    ondansetron injection 4 mg    oxyCODONE immediate release tablet 5 mg    oxyCODONE immediate  "release tablet Tab 10 mg    polyethylene glycol packet 17 g    pravastatin tablet 80 mg    pregabalin capsule 50 mg    prochlorperazine injection Soln 5 mg    senna-docusate 8.6-50 mg per tablet 1 tablet    spironolactone tablet 25 mg     Facility-Administered Medications Ordered in Other Encounters   Medication    fentaNYL 50 mcg/mL injection 25 mcg    midazolam (VERSED) 1 mg/mL injection 2 mg     Objective:     Vital Signs (Most Recent):  Temp: 98.3 °F (36.8 °C) (05/25/23 0428)  Pulse: 62 (05/25/23 0428)  Resp: 20 (05/25/23 0428)  BP: (!) 159/69 (05/25/23 0428)  SpO2: 97 % (05/25/23 0428) Vital Signs (24h Range):  Temp:  [97 °F (36.1 °C)-98.3 °F (36.8 °C)] 98.3 °F (36.8 °C)  Pulse:  [54-69] 62  Resp:  [16-20] 20  SpO2:  [97 %-100 %] 97 %  BP: (133-159)/(60-79) 159/69     Weight: 61.2 kg (135 lb)  Height: 5' 3" (160 cm)  Body mass index is 23.91 kg/m².      Intake/Output Summary (Last 24 hours) at 5/25/2023 0629  Last data filed at 5/24/2023 2320  Gross per 24 hour   Intake 1420 ml   Output 900 ml   Net 520 ml         Ortho/SPM Exam  Gen: NAD, sitting comfortably in bed  CV: regular rate  Resp: non-labored respirations    RLE:  Dressings clean, dry, and intact  Appropriate postoperative TTP  SILT Sa/Douglas/DP/SP/T  Motor intact EHL/FHL/TA/Gastroc  2+ DP, 2+ PT     Significant Labs: All pertinent labs within the past 24 hours have been reviewed.    Significant Imaging: I have reviewed and interpreted all pertinent imaging results/findings.    Assessment/Plan:     * Primary osteoarthritis of right knee  Gerda Lai is a 89 y.o. female s/p R TKA on 5/22/23 with Dr. Cobian. Doing well this morning.    Plan:  Pain control: multimodal  PT/OT: WBAT RLE  DVT PPx: ASA 81 mg BID  Abx: postop Ancef x 24 hours. Discharge on 7 days of cefadroxil 500 mg BID  HTN: home medications restarted    Dispo: to [facility] hopefully today            Kalin Mcbride MD  Orthopedics  [facility])  "

## 2023-05-25 NOTE — PT/OT/SLP PROGRESS
"Occupational Therapy   Treatment    Name: Gerda Lai  MRN: [id number]  Admitting Diagnosis:  Primary osteoarthritis of right knee  3 Days Post-Op    Recommendations:     Discharge Recommendations: nursing facility, skilled  Discharge Equipment Recommendations:  none  Barriers to discharge:  Decreased caregiver support    Assessment:     Gerda Lai is a 89 y.o. female with a medical diagnosis of Primary osteoarthritis of right knee.  She presents with the following deficits affecting function: weakness, impaired endurance, impaired self care skills, impaired functional mobility, pain, decreased safety awareness, decreased lower extremity function, decreased ROM, orthopedic precautions. Pt tolerated OT session well with good effort and motivation. She continues to require CGA-SBA for all ADLs and functional mobility along with cues for safety with transfers. Pt would continue to benefit from acute skilled therapy services to maximize functional independence, reduce caregiver burden, and facilitate safe discharge in the least restrictive environment.    Rehab Prognosis:  Good; patient would benefit from acute skilled OT services to address these deficits and reach maximum level of function.       Plan:     Patient to be seen daily to address the above listed problems via self-care/home management, therapeutic activities, therapeutic exercises  Plan of Care Expires: 05/26/23  Plan of Care Reviewed with: patient    Subjective   "The pain started getting higher this morning"  Chief Complaint: feeling tired  Patient/Family Comments/goals: to go to SNF  Pain/Comfort:  Pain Rating 1: 2/10  Location - Side 1: Right  Location - Orientation 1: generalized  Location 1: knee  Pain Addressed 1: Pre-medicate for activity, Reposition, Distraction  Pain Rating Post-Intervention 1: 2/10    Objective:     Communicated with: Gail CHAHAL prior to session.  Patient found HOB elevated with cryotherapy, FCD upon OT entry to " room.    General Precautions: Standard, fall    Orthopedic Precautions:RLE weight bearing as tolerated  Braces: N/A  Respiratory Status: Room air     Occupational Performance:     Bed Mobility:    Patient completed Scooting anteriorly on edge of bed with supervision  Patient completed Supine to Sit with supervision   HOB elevated    Functional Mobility/Transfers:  Patient completed Sit <> Stand Transfer with contact guard assistance  with  rolling walker   x1 trial from EOB and x2 trials from bedside chair  Patient completed Bed > Chair Transfer using Step Transfer technique with contact guard assistance with rolling walker  Functional Mobility: Pt engaged in functional mobility to simulate household/community distances EOB>bathroom sink and sink>bedside chair 10 ft + 14 ft with CGA and RW in order to maximize functional endurance and standing balance required for engagement in occupations of choice   No LOB or SOB noted  Verbal cueing for sequencing     Activities of Daily Living:  Grooming: supervision to wash face and brush teeth standing at sink with RW; SPV as pt washed underarms with wash cloth sitting on bedside chair  Upper Body Dressing: modified independence to [facility] gown and don overhead shirt sitting on chair  Lower Body Dressing: stand by assistance to manipulate underwear below hips, thread off feet seated, thread clean underwear and shorts onto b/l feet, and managed clothing above hips and rear with RW; SBA to don/doff socks and don shoes sitting on chair      AMPA 6 Click ADL: 21    Treatment & Education:  -Pt educated to dress surgical site first and further dressing techniques s/p surgery  -Pt educated on hand placement for transfers  -Pt educated on RW placement for ADLs  -Pt educated on proper foot wear s/p surgery  -Pt educated on positioning of sx LE during performance of functional activities vs rest/sleep  -Pt educated on weightbearing and surgical precautions with pt verbalizing 1/1  correctly  -Pt educated to call for assistance and to transfer with hospital staff only  -Pt educated on role of OT and plan of care s/p surgery at [facility], white board updated     Patient left up in chair with all lines intact, call button in reach, and RN notified    GOALS:   Multidisciplinary Problems       Occupational Therapy Goals          Problem: Occupational Therapy    Goal Priority Disciplines Outcome Interventions   Occupational Therapy Goal     OT, PT/OT Ongoing, Progressing    Description: Goals to be met by: 5/25/23     Patient will increase functional independence with ADLs by performing:    UE Dressing with Modified [place].  LE Dressing with Modified [place].  Grooming while standing at sink with Modified [place].  Toileting from toilet/bedside commode with Modified [place] for hygiene and clothing management.   Toilet transfer to toilet/bedside commode with Supervision and LRAD.                         Time Tracking:     OT Date of Treatment: 05/25/23  OT Start Time: 0950  OT Stop Time: 1020  OT Total Time (min): 30 min    Billable Minutes:Self Care/Home Management 20  Therapeutic Activity 10    OT/KATRINA: OT          5/25/2023

## 2023-05-25 NOTE — ASSESSMENT & PLAN NOTE
Gerda Lai is a 89 y.o. female s/p R TKA on 5/22/23 with Dr. Cobian. Doing well this morning.    Plan:  Pain control: multimodal  PT/OT: WBAT RLE  DVT PPx: ASA 81 mg BID  Abx: postop Ancef x 24 hours. Discharge on 7 days of cefadroxil 500 mg BID  HTN: home medications restarted    Dispo: to [facility] hopefully today

## 2023-05-25 NOTE — PLAN OF CARE
Problem: Adult Inpatient Plan of Care  Goal: Absence of Hospital-Acquired Illness or Injury  Intervention: Identify and Manage Fall Risk  Flowsheets (Taken 5/25/2023 0752)  Safety Promotion/Fall Prevention:   assistive device/personal item within reach   medications reviewed   lighting adjusted   instructed to call staff for mobility   side rails raised x 2     Problem: Adult Inpatient Plan of Care  Goal: Absence of Hospital-Acquired Illness or Injury  Intervention: Prevent and Manage VTE (Venous Thromboembolism) Risk  Flowsheets (Taken 5/25/2023 0752)  Activity Management: Ankle pumps - L1     Problem: Adult Inpatient Plan of Care  Goal: Absence of Hospital-Acquired Illness or Injury  Intervention: Prevent Infection  Flowsheets (Taken 5/25/2023 0752)  Infection Prevention: hand hygiene promoted     Problem: Adult Inpatient Plan of Care  Goal: Optimal Comfort and Wellbeing  Intervention: Monitor Pain and Promote Comfort  Flowsheets (Taken 5/25/2023 0752)  Pain Management Interventions:   care clustered   cold applied     Problem: Adult Inpatient Plan of Care  Goal: Optimal Comfort and Wellbeing  Intervention: Provide Person-Centered Care  Flowsheets (Taken 5/25/2023 0752)  Trust Relationship/Rapport:   care explained   questions answered   choices provided   questions encouraged   emotional support provided   empathic listening provided   thoughts/feelings acknowledged     Problem: Pain Acute  Goal: Acceptable Pain Control and Functional Ability  Intervention: Develop Pain Management Plan  Flowsheets (Taken 5/25/2023 0752)  Pain Management Interventions:   care clustered   cold applied     Problem: Pain Acute  Goal: Acceptable Pain Control and Functional Ability  Intervention: Prevent or Manage Pain  Flowsheets (Taken 5/25/2023 0752)  Medication Review/Management: medications reviewed     Problem: Pain Acute  Goal: Acceptable Pain Control and Functional Ability  Intervention: Optimize Psychosocial  Wellbeing  Flowsheets (Taken 5/25/2023 0752)  Supportive Measures: active listening utilized  Diversional Activities: television     Problem: Fall Injury Risk  Goal: Absence of Fall and Fall-Related Injury  Intervention: Identify and Manage Contributors  Flowsheets (Taken 5/25/2023 0752)  Medication Review/Management: medications reviewed     Problem: Fall Injury Risk  Goal: Absence of Fall and Fall-Related Injury  Intervention: Promote Injury-Free Environment  Flowsheets (Taken 5/25/2023 0752)  Safety Promotion/Fall Prevention:   assistive device/personal item within reach   medications reviewed   lighting adjusted   instructed to call staff for mobility   side rails raised x 2     Problem: Adjustment to Surgery (Knee Arthroplasty)  Goal: Optimal Coping  Intervention: Support Psychosocial Response to Surgery and Mobility Changes  Flowsheets (Taken 5/25/2023 0752)  Supportive Measures: active listening utilized     Problem: Bleeding (Knee Arthroplasty)  Goal: Absence of Bleeding  Intervention: Monitor and Manage Bleeding  Flowsheets (Taken 5/25/2023 0752)  Bleeding Management: dressing monitored     Problem: Functional Ability Impaired (Knee Arthroplasty)  Goal: Optimal Functional Ability  Intervention: Promote Optimal Functional Status  Flowsheets (Taken 5/25/2023 0752)  Activity Management: Ankle pumps - L1     Problem: Infection (Knee Arthroplasty)  Goal: Absence of Infection Signs and Symptoms  Intervention: Prevent or Manage Infection  Flowsheets (Taken 5/25/2023 0752)  Infection Prevention: hand hygiene promoted     Problem: Ongoing Anesthesia Effects (Knee Arthroplasty)  Goal: Anesthesia/Sedation Recovery  Intervention: Optimize Anesthesia Recovery  Flowsheets (Taken 5/25/2023 0752)  Safety Promotion/Fall Prevention:   assistive device/personal item within reach   medications reviewed   lighting adjusted   instructed to call staff for mobility   side rails raised x 2  Administration (IS):   instruction  provided, follow-up   proper technique demonstrated   self-administered     Problem: Pain (Knee Arthroplasty)  Goal: Acceptable Pain Control  Intervention: Prevent or Manage Pain  Flowsheets (Taken 5/25/2023 0752)  Diversional Activities: television  Pain Management Interventions:   care clustered   cold applied     Problem: Respiratory Compromise (Knee Arthroplasty)  Goal: Effective Oxygenation and Ventilation  Intervention: Optimize Oxygenation and Ventilation  Flowsheets (Taken 5/25/2023 0752)  Administration (IS):   instruction provided, follow-up   proper technique demonstrated   self-administered     Problem: Anxiety  Goal: Anxiety Reduction or Resolution  Intervention: Promote Anxiety Reduction  Flowsheets (Taken 5/25/2023 0752)  Supportive Measures: active listening utilized

## 2023-05-26 ENCOUNTER — HOSPITAL ENCOUNTER (INPATIENT)
Facility: HOSPITAL | Age: 88
LOS: 11 days | Discharge: HOME HEALTH | DRG: 554 | End: 2023-06-06
Attending: HOSPITALIST | Admitting: HOSPITALIST
Payer: MEDICARE

## 2023-05-26 VITALS
SYSTOLIC BLOOD PRESSURE: 140 MMHG | TEMPERATURE: 98 F | OXYGEN SATURATION: 100 % | HEIGHT: 63 IN | RESPIRATION RATE: 16 BRPM | BODY MASS INDEX: 23.92 KG/M2 | WEIGHT: 135 LBS | DIASTOLIC BLOOD PRESSURE: 72 MMHG | HEART RATE: 63 BPM

## 2023-05-26 DIAGNOSIS — M17.11 OSTEOARTHRITIS OF RIGHT KNEE: ICD-10-CM

## 2023-05-26 DIAGNOSIS — Z96.651 STATUS POST TOTAL RIGHT KNEE REPLACEMENT: Primary | ICD-10-CM

## 2023-05-26 PROCEDURE — 97530 THERAPEUTIC ACTIVITIES: CPT

## 2023-05-26 PROCEDURE — 25000003 PHARM REV CODE 250: Performed by: HOSPITALIST

## 2023-05-26 PROCEDURE — 99900035 HC TECH TIME PER 15 MIN (STAT)

## 2023-05-26 PROCEDURE — 11000004 HC SNF PRIVATE

## 2023-05-26 PROCEDURE — 97535 SELF CARE MNGMENT TRAINING: CPT

## 2023-05-26 PROCEDURE — 94761 N-INVAS EAR/PLS OXIMETRY MLT: CPT

## 2023-05-26 PROCEDURE — 25000003 PHARM REV CODE 250: Performed by: PHYSICIAN ASSISTANT

## 2023-05-26 PROCEDURE — 99232 SBSQ HOSP IP/OBS MODERATE 35: CPT | Mod: ,,, | Performed by: ANESTHESIOLOGY

## 2023-05-26 PROCEDURE — 25000003 PHARM REV CODE 250

## 2023-05-26 PROCEDURE — 97116 GAIT TRAINING THERAPY: CPT

## 2023-05-26 PROCEDURE — 99232 PR SUBSEQUENT HOSPITAL CARE,LEVL II: ICD-10-PCS | Mod: ,,, | Performed by: ANESTHESIOLOGY

## 2023-05-26 PROCEDURE — 97110 THERAPEUTIC EXERCISES: CPT

## 2023-05-26 RX ORDER — CHOLECALCIFEROL (VITAMIN D3) 25 MCG
1000 TABLET ORAL DAILY
Status: DISCONTINUED | OUTPATIENT
Start: 2023-05-27 | End: 2023-06-06 | Stop reason: HOSPADM

## 2023-05-26 RX ORDER — PRAVASTATIN SODIUM 20 MG/1
80 TABLET ORAL DAILY
Status: DISCONTINUED | OUTPATIENT
Start: 2023-05-27 | End: 2023-06-06 | Stop reason: HOSPADM

## 2023-05-26 RX ORDER — LABETALOL 100 MG/1
100 TABLET, FILM COATED ORAL EVERY 12 HOURS
Status: DISCONTINUED | OUTPATIENT
Start: 2023-05-26 | End: 2023-06-06 | Stop reason: HOSPADM

## 2023-05-26 RX ORDER — AMLODIPINE BESYLATE 5 MG/1
5 TABLET ORAL DAILY
Status: DISCONTINUED | OUTPATIENT
Start: 2023-05-27 | End: 2023-06-06 | Stop reason: HOSPADM

## 2023-05-26 RX ORDER — LORAZEPAM 0.5 MG/1
0.5 TABLET ORAL EVERY 12 HOURS PRN
Status: DISCONTINUED | OUTPATIENT
Start: 2023-05-26 | End: 2023-06-06 | Stop reason: HOSPADM

## 2023-05-26 RX ORDER — CALCIUM CARBONATE 200(500)MG
500 TABLET,CHEWABLE ORAL 2 TIMES DAILY PRN
Status: DISCONTINUED | OUTPATIENT
Start: 2023-05-26 | End: 2023-06-06 | Stop reason: HOSPADM

## 2023-05-26 RX ORDER — EPINEPHRINE 0.22MG
100 AEROSOL WITH ADAPTER (ML) INHALATION DAILY
Status: DISCONTINUED | OUTPATIENT
Start: 2023-05-27 | End: 2023-06-06 | Stop reason: HOSPADM

## 2023-05-26 RX ORDER — ALBUTEROL SULFATE 90 UG/1
2 AEROSOL, METERED RESPIRATORY (INHALATION) EVERY 6 HOURS PRN
Status: DISCONTINUED | OUTPATIENT
Start: 2023-05-26 | End: 2023-06-06 | Stop reason: HOSPADM

## 2023-05-26 RX ORDER — AMLODIPINE BESYLATE 5 MG/1
5 TABLET ORAL DAILY
Status: CANCELLED | OUTPATIENT
Start: 2023-05-26

## 2023-05-26 RX ORDER — ACETAMINOPHEN 325 MG/1
650 TABLET ORAL EVERY 8 HOURS
Status: DISCONTINUED | OUTPATIENT
Start: 2023-05-26 | End: 2023-06-06 | Stop reason: HOSPADM

## 2023-05-26 RX ORDER — METHOCARBAMOL 750 MG/1
750 TABLET, FILM COATED ORAL 3 TIMES DAILY PRN
Status: DISCONTINUED | OUTPATIENT
Start: 2023-05-26 | End: 2023-06-06 | Stop reason: HOSPADM

## 2023-05-26 RX ORDER — OXYCODONE HYDROCHLORIDE 10 MG/1
10 TABLET ORAL EVERY 6 HOURS PRN
Status: DISCONTINUED | OUTPATIENT
Start: 2023-05-26 | End: 2023-06-06 | Stop reason: HOSPADM

## 2023-05-26 RX ORDER — ALISKIREN 150 MG/1
300 TABLET, FILM COATED ORAL DAILY
Status: DISCONTINUED | OUTPATIENT
Start: 2023-05-27 | End: 2023-06-06 | Stop reason: HOSPADM

## 2023-05-26 RX ORDER — MUPIROCIN 20 MG/G
OINTMENT TOPICAL 3 TIMES DAILY
Status: DISCONTINUED | OUTPATIENT
Start: 2023-05-26 | End: 2023-05-29

## 2023-05-26 RX ORDER — OXYCODONE HYDROCHLORIDE 5 MG/1
5 TABLET ORAL EVERY 6 HOURS PRN
Status: DISCONTINUED | OUTPATIENT
Start: 2023-05-26 | End: 2023-06-06 | Stop reason: HOSPADM

## 2023-05-26 RX ORDER — DOCUSATE SODIUM 100 MG/1
100 CAPSULE, LIQUID FILLED ORAL 2 TIMES DAILY
Status: DISCONTINUED | OUTPATIENT
Start: 2023-05-26 | End: 2023-05-29

## 2023-05-26 RX ORDER — SPIRONOLACTONE 25 MG/1
25 TABLET ORAL
Status: DISCONTINUED | OUTPATIENT
Start: 2023-05-29 | End: 2023-06-06 | Stop reason: HOSPADM

## 2023-05-26 RX ORDER — CEFADROXIL 500 MG/1
500 CAPSULE ORAL EVERY 12 HOURS
Status: COMPLETED | OUTPATIENT
Start: 2023-05-26 | End: 2023-06-02

## 2023-05-26 RX ORDER — ACETAMINOPHEN 325 MG/1
650 TABLET ORAL EVERY 6 HOURS PRN
Status: DISCONTINUED | OUTPATIENT
Start: 2023-05-26 | End: 2023-06-06 | Stop reason: HOSPADM

## 2023-05-26 RX ORDER — FAMOTIDINE 20 MG/1
20 TABLET, FILM COATED ORAL DAILY
Status: DISCONTINUED | OUTPATIENT
Start: 2023-05-27 | End: 2023-06-06 | Stop reason: HOSPADM

## 2023-05-26 RX ORDER — ASPIRIN 81 MG/1
81 TABLET ORAL 2 TIMES DAILY
Status: DISCONTINUED | OUTPATIENT
Start: 2023-05-27 | End: 2023-06-06 | Stop reason: HOSPADM

## 2023-05-26 RX ORDER — TALC
6 POWDER (GRAM) TOPICAL NIGHTLY PRN
Status: DISCONTINUED | OUTPATIENT
Start: 2023-05-26 | End: 2023-06-06 | Stop reason: HOSPADM

## 2023-05-26 RX ORDER — AMOXICILLIN 250 MG
1 CAPSULE ORAL 2 TIMES DAILY
Status: DISCONTINUED | OUTPATIENT
Start: 2023-05-26 | End: 2023-05-30

## 2023-05-26 RX ORDER — LORAZEPAM 0.5 MG/1
0.5 TABLET ORAL EVERY 12 HOURS PRN
Status: DISCONTINUED | OUTPATIENT
Start: 2023-05-26 | End: 2023-06-01

## 2023-05-26 RX ADMIN — LABETALOL HYDROCHLORIDE 100 MG: 100 TABLET, FILM COATED ORAL at 09:05

## 2023-05-26 RX ADMIN — FAMOTIDINE 20 MG: 20 TABLET ORAL at 08:05

## 2023-05-26 RX ADMIN — ACETAMINOPHEN 1000 MG: 500 TABLET ORAL at 02:05

## 2023-05-26 RX ADMIN — CEFADROXIL 500 MG: 500 CAPSULE ORAL at 09:05

## 2023-05-26 RX ADMIN — SENNOSIDES AND DOCUSATE SODIUM 1 TABLET: 50; 8.6 TABLET ORAL at 09:05

## 2023-05-26 RX ADMIN — DOCUSATE SODIUM 100 MG: 100 CAPSULE, LIQUID FILLED ORAL at 09:05

## 2023-05-26 RX ADMIN — LORAZEPAM 0.5 MG: 0.5 TABLET ORAL at 09:05

## 2023-05-26 RX ADMIN — METHOCARBAMOL 750 MG: 750 TABLET ORAL at 09:05

## 2023-05-26 RX ADMIN — MUPIROCIN 1 G: 20 OINTMENT TOPICAL at 09:05

## 2023-05-26 RX ADMIN — ACETAMINOPHEN 1000 MG: 500 TABLET ORAL at 05:05

## 2023-05-26 RX ADMIN — DOCUSATE SODIUM 100 MG: 100 CAPSULE, LIQUID FILLED ORAL at 08:05

## 2023-05-26 RX ADMIN — PRAVASTATIN SODIUM 80 MG: 20 TABLET ORAL at 08:05

## 2023-05-26 RX ADMIN — METHOCARBAMOL 750 MG: 750 TABLET ORAL at 02:05

## 2023-05-26 RX ADMIN — SPIRONOLACTONE 25 MG: 25 TABLET, FILM COATED ORAL at 09:05

## 2023-05-26 RX ADMIN — ASPIRIN 81 MG: 81 TABLET, COATED ORAL at 09:05

## 2023-05-26 RX ADMIN — METHOCARBAMOL 750 MG: 750 TABLET ORAL at 08:05

## 2023-05-26 RX ADMIN — POLYETHYLENE GLYCOL 3350 17 G: 17 POWDER, FOR SOLUTION ORAL at 09:05

## 2023-05-26 RX ADMIN — AMLODIPINE BESYLATE 5 MG: 5 TABLET ORAL at 05:05

## 2023-05-26 RX ADMIN — ACETAMINOPHEN 650 MG: 325 TABLET ORAL at 09:05

## 2023-05-26 NOTE — NURSING
Has met unit/department guidelines for discharge from each phase of the post procedure continuum. Patient discharged to SNF. Instructions given to pt and niece. IV removed, tolerated well.  Patient verbalized understanding instructions.  AAOx3, VSS, NADN, no complaints noted at this time.  Wheelchair to private vehicle in care of [facility]. .

## 2023-05-26 NOTE — PLAN OF CARE
Problem: Adult Inpatient Plan of Care  Goal: Plan of Care Review  Outcome: Ongoing, Progressing  Flowsheets (Taken 5/26/2023 0931)  Plan of Care Reviewed With: patient     Problem: Adult Inpatient Plan of Care  Goal: Absence of Hospital-Acquired Illness or Injury  Intervention: Identify and Manage Fall Risk  Flowsheets (Taken 5/26/2023 0931)  Safety Promotion/Fall Prevention: assistive device/personal item within reach     Problem: Pain Acute  Goal: Acceptable Pain Control and Functional Ability  Intervention: Prevent or Manage Pain  Flowsheets (Taken 5/26/2023 0931)  Bowel Elimination Promotion: adequate fluid intake promoted  Medication Review/Management: medications reviewed     Problem: Fall Injury Risk  Goal: Absence of Fall and Fall-Related Injury  Intervention: Identify and Manage Contributors  Flowsheets (Taken 5/26/2023 0931)  Medication Review/Management: medications reviewed     Problem: Adjustment to Surgery (Knee Arthroplasty)  Goal: Optimal Coping  Intervention: Support Psychosocial Response to Surgery and Mobility Changes  Flowsheets (Taken 5/26/2023 0931)  Supportive Measures: active listening utilized     Plan of care reviewed with patient; verbalized understanding.   Medications reviewed and administered as ordered.  Rounding for safety and patient care per policy.   Safety precautions maintained. Patient working appropriately with PT/OT.  DME at bedside.  Call light within reach, bed wheels locked, bed in lowest position, side rails ^x2, safety maintained. NADN, Will continue monitor.

## 2023-05-26 NOTE — PROGRESS NOTES
Acute Pain Service and Perioperative Surgical Home Progress Note    Interval history  Gerda Lai is a 89 y.o., female,     Surgery:  Procedure(s) (LRB):  ARTHROPLASTY, KNEE, TOTAL, USING COMPUTER-ASSISTED NAVIGATION: QUYEN: RIGHT: MARTHA - IDANIA (Right)    Post Op Day #: 1    Problem List:    Active Hospital Problems    Diagnosis  POA    *Primary osteoarthritis of right knee [M17.11]  Yes     Moderate to severe DJD with some progression of joint space narrowing at the lateral compartment of the tibiofemoral joint space on the right.  Previously described left suprapatellar effusion is no longer present.  In addition, the previously described calcification above the left patella is also no longer seen.          Resolved Hospital Problems   No resolved problems to display.       Subjective:       General appearance of alert, oriented, no complaints   Pain with rest: 1    Numbers   Pain with movement: 1    Numbers   Side Effects    1. Pruritis No    2. Nausea No    3. Motor Blockade Yes, 0=Ability to raise lower extremities off bed    4. Sedation Yes, 1=awake and alert    Schedule Medications:    acetaminophen  1,000 mg Oral Q8H    aliskiren  300 mg Oral Daily    amLODIPine  5 mg Oral Daily    aspirin  81 mg Oral BID    docusate sodium  100 mg Oral BID    famotidine  20 mg Oral BID    labetaloL  100 mg Oral BID    methocarbamoL  750 mg Oral TID    mupirocin  1 g Nasal BID    polyethylene glycol  17 g Oral Daily    pravastatin  80 mg Oral Daily    pregabalin  50 mg Oral QHS    senna-docusate 8.6-50 mg  1 tablet Oral BID    spironolactone  25 mg Oral Once per day on Mon Wed Fri        Continuous Infusions:       PRN Medications:  albuterol, bisacodyL, morphine, naloxone, ondansetron, oxyCODONE, oxyCODONE, prochlorperazine       Antibiotics:  Antibiotics (From admission, onward)      Start     Stop Route Frequency Ordered    05/22/23 2100  mupirocin 2 % ointment 1 g         05/27 2059 Nasl 2 times daily 05/22/23  1848               Objective:         Vital Signs (Most Recent):  Temp: 97.4 °F (36.3 °C) (05/26/23 0409)  Pulse: 60 (05/26/23 0409)  Resp: 16 (05/26/23 0409)  BP: (!) 176/67 (05/26/23 0409)  SpO2: 99 % (05/26/23 0409) Vital Signs Range (Last 24H):  Temp:  [97.3 °F (36.3 °C)-98.2 °F (36.8 °C)]   Pulse:  [60-71]   Resp:  [16-18]   BP: (150-176)/(52-67)   SpO2:  [95 %-100 %]          I & O (Last 24H):  Intake/Output Summary (Last 24 hours) at 5/26/2023 0510  Last data filed at 5/26/2023 0315  Gross per 24 hour   Intake 1260 ml   Output 1400 ml   Net -140 ml       Physical Exam:    GA: Alert, comfortable, no acute distress.   Pulmonary: Clear to auscultation . Normal respiratory ratei.  Cardiac: regular rate and rhythm.          Laboratory: reviewed in chart  CBC: No results for input(s): WBC, RBC, HGB, HCT, PLT, MCV, MCH, MCHC in the last 72 hours.    BMP: No results for input(s): NA, K, CO2, CL, BUN, CREATININE, GLU, MG, PHOS, CALCIUM in the last 72 hours.    No results for input(s): PT, INR, PROTIME, APTT in the last 72 hours.          Assessment:         Pain control adequate    Plan:  1) Pain: Multimodal pain regimen ordered which includes acetaminophen, celecoxib, pregabalin, and prn oxycodone.  Well controlled on current regimen.  Will continue to monitor.    2) HTN: systolic running high.  Continuing home regimen, but PCP may need to adjust dose.   4) FEN/GI: Tolerating regular diet.     5) Dispo: Pt working well with PT/OT. Case management and SW following along for setting up home health and physical therapy. Plan to discharge SNF this am.              Evaluator Anna Wilson PA-C

## 2023-05-26 NOTE — NURSING
Pt arrived to floor for skilled services with admitting diagnosis of Unilateral primary osteoarthritis,* via wheelchair. Pt required 1 person assistance from wheelchair to bed. Pt is AAOx4, continent of B/B. Pt is on a Regular diet. Skin assessment done: Right knee incision with guaze and Tegaderm. Swelling noted to knee, leg and ankle. Blanchable redness to buttocks, no additional skin breakdown noted. Family at bedside. POC reviewed with patient. Pt denies pain at this time and is educated to use call light and not get OOB w/o assistance

## 2023-05-26 NOTE — PLAN OF CARE
[facility])  Facility Transfer Orders        Admit to: [facility]    Diagnoses:   Active Hospital Problems    Diagnosis  POA    *Primary osteoarthritis of right knee [M17.11]  Yes     Moderate to severe DJD with some progression of joint space narrowing at the lateral compartment of the tibiofemoral joint space on the right.  Previously described left suprapatellar effusion is no longer present.  In addition, the previously described calcification above the left patella is also no longer seen.          Resolved Hospital Problems   No resolved problems to display.     Allergies:   Review of patient's allergies indicates:   Allergen Reactions    Sulfa (sulfonamide antibiotics) Rash    Clarithromycin Other (See Comments)     Weak, extreme fatigue. dizziness    Flexeril [cyclobenzaprine] Other (See Comments)     Dizziness    Iodine and iodide containing products     Lisinopril Other (See Comments)     Cough and sensation of throat swelling/?angioedema    Losartan Rash    Metoprolol Swelling     Tightness in throat    Spironolactone      Throat tightening    Tramadol Other (See Comments)     Dizzy and weak    Verapamil (bulk) Palpitations    Voltaren [diclofenac sodium] Other (See Comments)     Drops blood pressure       Code Status: FUll    Vitals: Routine       Diet: regular diet    Activity: Activity as tolerated    Nursing Precautions: Fall    Bed/Surface: Pressure Redistribution    Consults: PT to evaluate and treat- 3 times a week    Oxygen: room air    Dialysis: Patient is not on dialysis.       Pending Diagnostic Studies:       None          Imaging: None        IV Access: Peripheral     Medications: Discontinue all previous medication orders, if any. See new list below.  Current Discharge Medication List        CONTINUE these medications which have NOT CHANGED    Details   acetaminophen (TYLENOL) 650 MG TbSR Take 1 tablet (650 mg total) by mouth every 8 (eight) hours.  Qty: 120 tablet, Refills: 0       albuterol (PROVENTIL/VENTOLIN HFA) 90 mcg/actuation inhaler INHALE 1 TO 2 PUFFS BY MOUTH EVERY 6 HOURS AS NEEDED FOR WHEEZE  Qty: 8.5 g, Refills: 5      aliskiren (TEKTURNA) 300 MG Tab TAKE 1 TABLET BY MOUTH EVERY DAY  Qty: 90 tablet, Refills: 2      aspirin (ECOTRIN) 81 MG EC tablet Take 1 tablet (81 mg total) by mouth 2 (two) times daily.  Qty: 60 tablet, Refills: 0      coenzyme Q10 100 mg capsule Take 100 mg by mouth once daily.      felodipine (PLENDIL) 5 MG 24 hr tablet Take 1 tablet (5 mg total) by mouth once daily.  Qty: 90 tablet, Refills: 3    Comments: .             labetaloL (NORMODYNE) 100 MG tablet Take 100 mg by mouth 2 (two) times daily.      LORazepam (ATIVAN) 0.5 MG tablet TAKE 1/2 TABLET TO 1 TABLET BY MOUTH EVERY 12 HOURS AS NEEDED FOR ANXIETY  Qty: 30 tablet, Refills: 0    Associated Diagnoses: Hypertension, essential      multivitamin capsule Take 1 capsule by mouth once daily.      omeprazole (PRILOSEC OTC) 20 MG tablet Take 20 mg by mouth once daily.                 spironolactone (ALDACTONE) 25 MG tablet Take 1 tablet (25 mg total) by mouth 3 (three) times a week.  Qty: 30 tablet, Refills: 11    Comments: .  Associated Diagnoses: Hypertension, essential             vitamin D (VITAMIN D3) 1000 units Tab Take 1,000 Units by mouth once daily.      cefadroxil (DURICEF) 500 MG Cap Take 1 capsule (500 mg total) by mouth every 12 (twelve) hours. for 7 days  Qty: 14 capsule, Refills: 0                 docusate sodium (COLACE) 100 MG capsule Take 1 capsule (100 mg total) by mouth 2 (two) times daily.  Qty: 60 capsule, Refills: 0             methocarbamoL (ROBAXIN) 750 MG Tab Take 1 tablet (750 mg total) by mouth 3 (three) times daily as needed.  Qty: 21 tablet, Refills: 0                  mupirocin (BACTROBAN) 2 % ointment Apply topically 3 (three) times daily. Apply inside each nostril twice a day for 7 days.  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 Yes, quantity medically necessary      pravastatin (PRAVACHOL) 80 MG tablet Take 1 tablet (80 mg total) by mouth once daily.  Qty: 90 tablet, Refills: 3    Associated Diagnoses: Bilateral carotid artery stenosis; Aortic atherosclerosis; Hyperlipidemia, unspecified hyperlipidemia type              Follow up:       Immunizations Administered as of 5/26/2023       Name Date Dose VIS Date Route Exp Date    COVID-19, MRNA, LN-S, PF (Pfizer) (Purple Cap) 11/2/2021  4:18 PM 0.3 mL 12/12/2020 Intramuscular 1/31/2022    Site: Left deltoid     Given By: Harrison Crocker RN     : [employer]     Lot: [id number]     COVID-19, MRNA, LN-S, PF (Pfizer) (Purple Cap) 2/7/2021 11:46 AM 0.3 mL 12/12/2020 Intramuscular 6/30/2021    Site: Left deltoid     Given By: Nataliya Terrazas RN     : [employer]     Lot: [id number]     COVID-19, MRNA, LN-S, PF (Pfizer) (Purple Cap) 1/17/2021 11:14 AM 0.3 mL 12/12/2020 Intramuscular 5/31/2021    Site: Left deltoid     Given By: Janet Feliz LPN     : [employer]     Lot: [id number]             This patient has had both covid vaccinations    Some patients may experience side effects after vaccination.  These may include fever, headache, muscle or joint aches.  Most symptoms resolve with 24-48 hours and do not require urgent medical evaluation unless they persist for more than 72 hours or symptoms are concerning for an unrelated medical condition.          Kalin Mcbride MD

## 2023-05-26 NOTE — NURSING
SARITHA Palma PA-C made aware of /67.  Pt denies s/s of elevated BP.  NADN.  No new orders received.  Will cont to monitor.

## 2023-05-26 NOTE — PT/OT/SLP PROGRESS
Occupational Therapy   Treatment    Name: Gerda Lai  MRN: [id number]  Admitting Diagnosis:  Primary osteoarthritis of right knee  4 Days Post-Op    Recommendations:     Discharge Recommendations: nursing facility, skilled  Discharge Equipment Recommendations:  none  Barriers to discharge:  Decreased caregiver support    Assessment:     Gerda Lai is a 89 y.o. female with a medical diagnosis of Primary osteoarthritis of right knee.  She presents with the following deficits affecting function: impaired endurance, impaired self care skills, impaired functional mobility, pain, decreased safety awareness, decreased lower extremity function, decreased ROM, orthopedic precautions. Patient tolerated OT session well with good effort and motivation to progress. Patient performed supine>sit with SPV, UBD with modified (I), LBD with SPV, toileting with SPV, and toilet t/f with SBA and RW. No LOB or SOB noted. Patient would continue to benefit from acute skilled OT intervention to address established goals. Once medically stable, OT recommending SNF in order to maximize independence with functional activities, reduce caregiver burden, and facilitate safe discharge.     Rehab Prognosis:  Good; patient would benefit from acute skilled OT services to address these deficits and reach maximum level of function.       Plan:     During this hospital admission, Patient to be seen daily to address the above listed problems via self-care/home management, therapeutic activities, therapeutic exercises  Plan of Care Expires: 05/25/23  Plan of Care Reviewed with: patient    Subjective     Chief Complaint: none stated  Patient/Family Comments/goals: to increase (I) and safety  Pain/Comfort:  Pain Rating 1: 1/10  Location - Side 1: Right  Location - Orientation 1: generalized  Location 1: knee  Pain Addressed 1: Pre-medicate for activity, Reposition, Distraction  Pain Rating Post-Intervention 1: 1/10    Objective:     Communicated with:  RN, prior to session.  Patient found HOB elevated with cryotherapy, FCD upon OT entry to room.    General Precautions: Standard, fall    Orthopedic Precautions:RLE weight bearing as tolerated  Braces: N/A  Respiratory Status: Room air     Occupational Performance:     Bed Mobility:    Patient completed Scooting/Bridging with supervision  Patient completed Supine to Sit with supervision     Functional Mobility/Transfers:  Patient completed Sit <> Stand Transfer with stand by assistance  with  rolling walker   Patient completed Bed <> Chair Transfer using Step Transfer technique with stand by assistance with rolling walker  Patient completed Toilet Transfer Step Transfer technique with stand by assistance with  rolling walker  Functional Mobility: Pt engaged in functional mobility to simulate household/community distances EOB> toilet (BSC over toilet) and toilet>bedside chair with CGA progressing to close SBA and RW in order to maximize functional endurance and standing balance required for engagement in occupations of choice   No LOB or SOB noted    Activities of Daily Living:  Grooming: modified independence to wash hands and brush teeth in standing at sink with rolling walker  Upper Body Dressing: modified independence to don overhead shirt sitting EOB  Lower Body Dressing: supervision threading b/l feet through pants and manipulating above b/l hips and rear with RW; SPV to don shoes EOB  Toileting: supervision as patient managed lower body clothing above/below hips and cleansed anterior/posterior perineal in standing from toilet (BSC over toilet)      [facility] 6 Click ADL: 22    Treatment & Education:  -Pt educated to dress surgical site first and further dressing techniques s/p surgery  -Pt educated on hand placement for transfers  -Pt educated on RW placement for ADLs  -Pt educated on proper foot wear s/p surgery  -Pt educated on car transfer technique  -Pt educated on positioning of sx LE during performance of  functional activities vs rest/sleep  -Pt educated on weightbearing and surgical precautions with pt verbalizing 1/1 correctly  -Pt educated to call for assistance and to transfer with hospital staff only  -Pt educated on role of OT and plan of care s/p surgery at [facility], white board updated     Patient left up in chair with all lines intact, call button in reach, and RN notified    GOALS:   Multidisciplinary Problems       Occupational Therapy Goals          Problem: Occupational Therapy    Goal Priority Disciplines Outcome Interventions   Occupational Therapy Goal     OT, PT/OT Ongoing, Progressing    Description: Goals to be met by: 5/25/23     Patient will increase functional independence with ADLs by performing:    UE Dressing with Modified [place].  LE Dressing with Modified [place].  Grooming while standing at sink with Modified [place].  Toileting from toilet/bedside commode with Modified [place] for hygiene and clothing management.   Toilet transfer to toilet/bedside commode with Supervision and LRAD.                         Time Tracking:     OT Date of Treatment: 05/26/23  OT Start Time: 0750  OT Stop Time: 0816  OT Total Time (min): 26 min    Billable Minutes:Self Care/Home Management 16  Therapeutic Activity 10    OT/KATRINA: OT          5/26/2023

## 2023-05-26 NOTE — PLAN OF CARE
Problem: Fall Injury Risk  Goal: Absence of Fall and Fall-Related Injury  Intervention: Promote Injury-Free Environment  Flowsheets (Taken 5/26/2023 8317)  Safety Promotion/Fall Prevention:   assistive device/personal item within reach   Fall Risk reviewed with patient/family   side rails raised x 2   nonskid shoes/socks when out of bed   instructed to call staff for mobility     Problem: Bowel Motility Impaired (Knee Arthroplasty)  Goal: Effective Bowel Elimination  Intervention: Enhance Bowel Motility and Elimination  Flowsheets (Taken 5/26/2023 0420)  Bowel Elimination Management:   toileting offered   suppository given   sitting position facilitated  Bowel Elimination Promotion: adequate fluid intake promoted     Problem: Infection (Knee Arthroplasty)  Goal: Absence of Infection Signs and Symptoms  Intervention: Prevent or Manage Infection  Flowsheets (Taken 5/26/2023 0420)  Infection Prevention:   hand hygiene promoted   single patient room provided   rest/sleep promoted  Fever Reduction/Comfort Measures: ice pack(s) applied  Infection Management: aseptic technique maintained

## 2023-05-26 NOTE — DISCHARGE SUMMARY
[facility])  Orthopedics  Discharge Summary      Patient Name: Gerda Lai  MRN: [id number]  Admission Date: 5/22/2023  Hospital Length of Stay: 4 days  Discharge Date and Time:  05/26/2023 8:39 AM  Attending Physician: Milton Cobian III, MD   Discharging Provider: Kalin Mcbride MD  Primary Care Provider: Tomas Beltran MD    HPI: Gerda Lai is a 89 y.o. female with history of Right knee pain. Pain is worse with activity and weight bearing.  Patient has experienced interference of activities of daily living due to decreased range of motion and an increase in joint pain and swelling.  Patient has failed non-operative treatment including NSAIDs, corticosteroid injections, viscosupplement injections, and activity modification.  Gerda Lai currently ambulates using assistive device .      Relevant medical conditions of significance in perioperative period:  CKD stage 3 management PCP     Patient's age frail condition Given documented medical comorbidities including those listed above and based off of CMS criteria patient would meet inpatient admission status guidelines. This case has been approved as inpatient.     Procedure(s) (LRB):  ARTHROPLASTY, KNEE, TOTAL, USING COMPUTER-ASSISTED NAVIGATION: QUYEN: RIGHT: MARTHA - TRIATHTEXN (Right)      Hospital Course: Patient presented for above procedure.  Tolerated it well and was discharged home POD 3 after voiding, tolerating diet, ambulating, pain controlled.  Discharge instructions, follow-up appointment, and med rec are below.     Consults (From admission, onward)          Status Ordering Provider     Inpatient consult to Social Work  Once        Provider:  (Not yet assigned)    Acknowledged YOLANDE HILARIO     Inpatient consult to Social Work/Case Management  Once        Provider:  (Not yet assigned)    Acknowledged ALEJANDRO SWANSON     Inpatient consult to Pain Management  Once        Provider:  (Not yet assigned)    Acknowledged  YOLANDE HILARIO     Inpatient consult to Respiratory Care  Once        Provider:  (Not yet assigned)    Acknowledged YOLANDE HILARIO            Significant Diagnostic Studies: No pertinent studies.    Pending Diagnostic Studies:       None          Final Active Diagnoses:    Diagnosis Date Noted POA    PRINCIPAL PROBLEM:  Primary osteoarthritis of right knee [M17.11] 01/05/2016 Yes      Problems Resolved During this Admission:      Discharged Condition: stable    Disposition: Skilled Nursing Facility    Follow Up:    Patient Instructions:      Activity as tolerated     Sponge bath only until clinic visit     Keep surgical extremity elevated     Lifting restrictions   Order Comments: No strenuous exercise or lifting of > 10 lbs     Weight bearing as tolerated     No driving, operating heavy equipment or signing legal documents while taking pain medication     Leave dressing on - Keep it clean, dry, and intact until clinic visit   Order Comments: Do not remove surgical dressing for 2 weeks post-op. This will be done only by MD at initial post-op visit. If dressing is completely saturated, replace with identical dressing - silver-impregnated hydrocolloid dressing.     Do not get dressings wet. Do not shower.     If dressing continues to be saturated or there are signs of infection, please call [facility] [phone] for further instructions and to make appt to be seen.     Call MD for:  temperature >100.4     Call MD for:  persistent nausea and vomiting     Call MD for:  severe uncontrolled pain     Call MD for:  difficulty breathing, headache or visual disturbances     Call MD for:  redness, tenderness, or signs of infection (pain, swelling, redness, odor or green/yellow discharge around incision site)     Call MD for:  hives     Call MD for:  persistent dizziness or light-headedness     Call MD for:  extreme fatigue     Medications:  Reconciled Home Medications:      Medication List        CHANGE how you  take these medications      aspirin 81 MG EC tablet  Commonly known as: ECOTRIN  Take 1 tablet (81 mg total) by mouth 2 (two) times daily.  What changed: Another medication with the same name was removed. Continue taking this medication, and follow the directions you see here.            CONTINUE taking these medications      acetaminophen 650 MG Tbsr  Commonly known as: TYLENOL  Take 1 tablet (650 mg total) by mouth every 8 (eight) hours.     albuterol 90 mcg/actuation inhaler  Commonly known as: PROVENTIL/VENTOLIN HFA  INHALE 1 TO 2 PUFFS BY MOUTH EVERY 6 HOURS AS NEEDED FOR WHEEZE     aliskiren 300 MG Tab  Commonly known as: TEKTURNA  TAKE 1 TABLET BY MOUTH EVERY DAY     cefadroxil 500 MG Cap  Commonly known as: DURICEF  Take 1 capsule (500 mg total) by mouth every 12 (twelve) hours. for 7 days     clotrimazole-betamethasone 1-0.05% cream  Commonly known as: LOTRISONE  Apply topically once daily. To affected toenail.     coenzyme Q10 100 mg capsule  Take 100 mg by mouth once daily.     docusate sodium 100 MG capsule  Commonly known as: COLACE  Take 1 capsule (100 mg total) by mouth 2 (two) times daily.     felodipine 5 MG 24 hr tablet  Commonly known as: PLENDIL  Take 1 tablet (5 mg total) by mouth once daily.     glucosamine-chondroitin 500-400 mg tablet  Take 1 tablet by mouth once daily.     labetaloL 100 MG tablet  Commonly known as: NORMODYNE  Take 100 mg by mouth 2 (two) times daily.     lactase 3,000 unit tablet  Commonly known as: LACTAID  Take 1 tablet by mouth 3 (three) times daily as needed.     LORazepam 0.5 MG tablet  Commonly known as: ATIVAN  TAKE 1/2 TABLET TO 1 TABLET BY MOUTH EVERY 12 HOURS AS NEEDED FOR ANXIETY     methocarbamoL 750 MG Tab  Commonly known as: ROBAXIN  Take 1 tablet (750 mg total) by mouth 3 (three) times daily as needed.     multivitamin capsule  Take 1 capsule by mouth once daily.     * mupirocin 2 % ointment  Commonly known as: BACTROBAN  AAA bid     * mupirocin 2 %  ointment  Commonly known as: BACTROBAN  Apply topically 3 (three) times daily. Apply inside each nostril twice a day for 7 days.     omeprazole 20 MG tablet  Commonly known as: PRILOSEC OTC  Take 20 mg by mouth once daily.     oxyCODONE 5 MG immediate release tablet  Commonly known as: ROXICODONE  May take 1-2 tablets every 6 hours as needed for pain     polymyxin B sulf-trimethoprim 10,000 unit- 1 mg/mL Drop  Commonly known as: POLYTRIM  Place 1 drop into both eyes every 6 (six) hours.     pravastatin 80 MG tablet  Commonly known as: PRAVACHOL  Take 1 tablet (80 mg total) by mouth once daily.     spironolactone 25 MG tablet  Commonly known as: ALDACTONE  Take 1 tablet (25 mg total) by mouth 3 (three) times a week.     TURMERIC ORAL  Take 500 mg by mouth once daily.     vitamin D 1000 units Tab  Commonly known as: VITAMIN D3  Take 1,000 Units by mouth once daily.           * This list has 2 medication(s) that are the same as other medications prescribed for you. Read the directions carefully, and ask your doctor or other care provider to review them with you.                  Kalin Mcbride MD  Orthopedics  [facility]

## 2023-05-26 NOTE — PLAN OF CARE
05/26/23 1349   Post-Acute Status   Post-Acute Authorization Placement   Post-Acute Placement Status Set-up Complete/Auth obtained   Discharge Plan   Discharge Plan A Skilled Nursing Facility   Discharge Plan B Skilled Nursing Facility     Patient's set-up complete. [facility] order placed for wheelchair van transport from [facility] to [facility] on 5/26/23 @ 230PM. Nurse Ro to call report to [phone]. Updated patient via phone of transport time.    Rafaela Delgadillo RNCM  Case Management  [facility]  [phone]

## 2023-05-26 NOTE — NURSING
Report called to [facility], spoke to Tess. IV removed.   [facility] awaiting arrival of transport.   Pt up in chair, niece at side.   WCTM

## 2023-05-26 NOTE — PT/OT/SLP PROGRESS
Physical Therapy   Progress Note    Patient Name:  Gerda Lai  MRN: [id number]    Admit Date: 5/22/2023  Admitting Diagnosis:  Primary osteoarthritis of right knee  Length of Stay: 4 days  Recent Surgery: Procedure(s) (LRB):  ARTHROPLASTY, KNEE, TOTAL, USING COMPUTER-ASSISTED NAVIGATION: QUYEN: RIGHT: MARTHA - TRIATHALON (Right) 4 Days Post-Op    Recommendations:     Discharge Recommendations: Skilled Nursing Facility   Equipment recommendations: none  Barriers to discharge: Decreased caregiver support available     Assessment:     Gerda Lai is a 89 y.o. female admitted to [facility] on 5/22/2023 with medical diagnosis of Primary osteoarthritis of right knee. Pt presents with impaired endurance, impaired self care skills, impaired functional mobility, gait instability, decreased coordination, decreased lower extremity function, pain, orthopedic precautions. Pt is progressing towards goals, but has not yet reached prior level of function.     Pt is agreeable to therapy session and is eager to work with therapy. Pt performed therex in recliner prior to ambulating ~90 ft with RW step to with progression to step-through gait with 1 standing rest break. Gait is slow with decreased step length and increased forward trunk flexion.     Gerda Lai would benefit from continued acute PT intervention to improve quality of life, focus on recovery of impairments, provide patient/caregiver education, reduce fall risk, and maximize (I) and safety with functional mobility. Once medically stable, recommending pt discharge to Skilled Nursing Facility  to address functional mobility deficits    Rehab Prognosis: Good    Plan:     During this hospitalization, patient to be seen daily to address the identified rehab impairments via gait training, therapeutic exercises, therapeutic activities, neuromuscular re-education and progress towards stated goals.     Plan of Care Expires:  05/26/23  Plan of Care reviewed with: patient    This  "plan of care has been discussed with the patient/caregiver, who was included in its development and is in agreement with the identified goals and treatment plan.     Subjective     Communicated with RN prior to session.  Patient found up in chair upon PT entry to room, agreeable to therapy session. Pt alone during session.    Patient/Family Comments/goals: "Do you know where I will be going"    Pain/Comfort:  Pain Rating 1: 0/10  Location - Side 1: Right  Location - Orientation 1: generalized  Location 1: knee  Pain Addressed 1: Reposition, Distraction, Cessation of Activity  Pain Rating Post-Intervention 1:  (pt did not rate)    Patients cultural, spiritual, Voodoo conflicts given the current situation: None identified     Objective:     Patient found with: cryotherapy    General Precautions: Standard, fall   Orthopedic Precautions:RLE weight bearing as tolerated   Braces: N/A   Oxygen Device: room air    Cognition:  Pt is Alert during session.    Therapist provided skilled verbal and tactile cueing to facilitate the following functional mobility tasks. Listed tasks are focused on recovery of impairments and improving pt's independence and efficiency with bed mobility, transfers and ambulation as able.     Bed Mobility: not assessed as pt in recliner    Transfers:   Sit <> Stand Transfer: Contact Guard Assistance from recliner with RW AD     Gait:  Distance: ~90 ft  Assistance level: Contact Guard Assistance  Assistive Device: rolling walker  Gait Assessment: decreased step length , decreased rupinder, decreased gait speed, and antalgic gait pattern    Balance:  Dynamic Sitting: GOOD: Maintains balance through MODERATE excursions of active trunk movement  Standing:  Static: FAIR+: Takes MINIMAL challenges from all directions   Dynamic: FAIR: Needs CONTACT GUARD during gait    Outcome Measure: AM-PAC 6 CLICK MOBILITY  Total Score:23     Patient/Caregiver Education and Additional Therapeutic Activities/Exercises "   Performed following therex with handout provided at end with primarily focus on RLE:  X10 LAQ  X30 sarah ankle pumps  X10 heel slides  X10 with 3 sec hold quad sets  X10 with 3 sec hold glut squeezes  X10 SAQ  X10 SLR      Provided pt/caregiver education regarding:   PT POC and goals for therapy   Safety with mobility and fall risk   Safe management of AD as needed   Energy conservation techniques   Instruction on use of call button and importance of calling nursing staff for assistance with mobility     Patient/caregiver able to verbalize understanding; will follow-up with pt/caregiver during current admit for additional questions/concerns within scope of practice.     White board updated.     Patient left up in chair with all lines intact, call button in reach, and RN notified.    Goals:     Multidisciplinary Problems       Physical Therapy Goals          Problem: Physical Therapy    Goal Priority Disciplines Outcome Goal Variances Interventions   Physical Therapy Goal     PT, PT/OT Ongoing, Progressing     Description: Goals to be met by: 2023    Patient will increase functional independence with mobility by performin. Supine to sit with supervision  2. Sit to stand transfer with Supervision  3. Gait x150 feet with Supervision using Rolling Walker  4. Ascend/Descend 1 curb step with Stand by assistance using Rolling Walker  5. Lower extremity exercise program x30 reps per handout, with supervision                            Time Tracking:       PT Received On: 23  PT Start Time: 817     PT Stop Time: 846  PT Total Time (min): 29 min     Billable Minutes: Gait Training 19 and Therapeutic Exercise 10    2023

## 2023-05-26 NOTE — PLAN OF CARE
[facility]  Discharge Final Note    Primary Care Provider: Tomas Beltran MD    Expected Discharge Date: 5/26/2023    Final Discharge Note (most recent)       Final Note - 05/26/23 1451          Final Note    Assessment Type Final Discharge Note     Anticipated Discharge Disposition Skilled Nursing Facility     What phone number can be called within the next 1-3 days to see how you are doing after discharge? --   [phone]    Hospital Resources/Appts/Education Provided Appointments scheduled and added to AVS        Post-Acute Status    Post-Acute Authorization Placement     Post-Acute Placement Status Set-up Complete/Auth obtained                     Important Message from Medicare      Patient discharged to [facility] on 5/26/23.    Rafaela Delgadillo RNCM  Case Management  [facility]  [phone]

## 2023-05-27 PROBLEM — Z96.651 STATUS POST TOTAL RIGHT KNEE REPLACEMENT: Status: ACTIVE | Noted: 2023-05-27

## 2023-05-27 PROCEDURE — 97530 THERAPEUTIC ACTIVITIES: CPT

## 2023-05-27 PROCEDURE — 11000004 HC SNF PRIVATE

## 2023-05-27 PROCEDURE — 97161 PT EVAL LOW COMPLEX 20 MIN: CPT

## 2023-05-27 PROCEDURE — 97165 OT EVAL LOW COMPLEX 30 MIN: CPT

## 2023-05-27 PROCEDURE — 25000003 PHARM REV CODE 250: Performed by: HOSPITALIST

## 2023-05-27 PROCEDURE — 94761 N-INVAS EAR/PLS OXIMETRY MLT: CPT

## 2023-05-27 PROCEDURE — 97535 SELF CARE MNGMENT TRAINING: CPT

## 2023-05-27 PROCEDURE — 97110 THERAPEUTIC EXERCISES: CPT

## 2023-05-27 RX ADMIN — CHOLECALCIFEROL TAB 25 MCG (1000 UNIT) 1000 UNITS: 25 TAB at 08:05

## 2023-05-27 RX ADMIN — ACETAMINOPHEN 650 MG: 325 TABLET ORAL at 10:05

## 2023-05-27 RX ADMIN — MUPIROCIN: 20 OINTMENT TOPICAL at 09:05

## 2023-05-27 RX ADMIN — DOCUSATE SODIUM 100 MG: 100 CAPSULE, LIQUID FILLED ORAL at 08:05

## 2023-05-27 RX ADMIN — MUPIROCIN: 20 OINTMENT TOPICAL at 04:05

## 2023-05-27 RX ADMIN — AMLODIPINE BESYLATE 5 MG: 5 TABLET ORAL at 08:05

## 2023-05-27 RX ADMIN — ACETAMINOPHEN 650 MG: 325 TABLET ORAL at 05:05

## 2023-05-27 RX ADMIN — LABETALOL HYDROCHLORIDE 100 MG: 100 TABLET, FILM COATED ORAL at 01:05

## 2023-05-27 RX ADMIN — SENNOSIDES AND DOCUSATE SODIUM 1 TABLET: 50; 8.6 TABLET ORAL at 08:05

## 2023-05-27 RX ADMIN — ALISKIREN HEMIFUMARATE 300 MG: 150 TABLET, FILM COATED ORAL at 01:05

## 2023-05-27 RX ADMIN — CEFADROXIL 500 MG: 500 CAPSULE ORAL at 10:05

## 2023-05-27 RX ADMIN — ACETAMINOPHEN 650 MG: 325 TABLET ORAL at 01:05

## 2023-05-27 RX ADMIN — DOCUSATE SODIUM 100 MG: 100 CAPSULE, LIQUID FILLED ORAL at 09:05

## 2023-05-27 RX ADMIN — LABETALOL HYDROCHLORIDE 100 MG: 100 TABLET, FILM COATED ORAL at 10:05

## 2023-05-27 RX ADMIN — PRAVASTATIN SODIUM 80 MG: 20 TABLET ORAL at 08:05

## 2023-05-27 RX ADMIN — FAMOTIDINE 20 MG: 20 TABLET ORAL at 08:05

## 2023-05-27 RX ADMIN — SENNOSIDES AND DOCUSATE SODIUM 1 TABLET: 50; 8.6 TABLET ORAL at 09:05

## 2023-05-27 RX ADMIN — ASPIRIN 81 MG: 81 TABLET, COATED ORAL at 08:05

## 2023-05-27 RX ADMIN — Medication 100 MG: at 08:05

## 2023-05-27 RX ADMIN — CEFADROXIL 500 MG: 500 CAPSULE ORAL at 08:05

## 2023-05-27 RX ADMIN — THERA TABS 1 TABLET: TAB at 08:05

## 2023-05-27 RX ADMIN — MUPIROCIN: 20 OINTMENT TOPICAL at 10:05

## 2023-05-27 RX ADMIN — ASPIRIN 81 MG: 81 TABLET, COATED ORAL at 09:05

## 2023-05-27 NOTE — PLAN OF CARE
Evaluation completed. POC established.     Problem: Physical Therapy  Goal: Physical Therapy Goal  Description: Goals to be met by: 23     Patient will increase functional independence with mobility by performin. Supine to sit with Modified [place]  2. Sit to supine with Modified [place]  3. Rolling to Left and Right with [place]  4. Sit to stand transfer with Modified [place]  5. Bed to chair transfer with Modified [place] using Rolling Walker  6. Gait  x 150 feet with Supervision using Rolling Walker  7. Wheelchair propulsion x 50 feet with Supervision using bilateral upper extremities  8. Ascend/Descend 6 inch/8 inch curb step with Stand-by Assistance using Rolling Walker  9. Lower extremity exercise program 3 x 10 reps per handout, with assistance as needed    Outcome: Ongoing, Progressing   2023

## 2023-05-27 NOTE — PLAN OF CARE
Problem: Occupational Therapy  Goal: Occupational Therapy Goal  Description: Goals to be met by: 06/27/23     Patient will increase functional independence with ADLs by performing:    UE Dressing with Modified [place].  LE Dressing with Modified [place].  Grooming while standing at sink with Modified [place].  Toileting from toilet with Modified [place] for hygiene and clothing management.   Bathing from  sitting at sink with Modified [place].  Toilet transfer to toilet with Modified [place].  Upper extremity exercise program 3x15 reps per handout, with independence.    Outcome: Ongoing, Progressing     Pt was agreeable to and participated in OT evaluation.  Pt lives alone and completed all func mobility skills and ADLS with mod I at PLOF.  Pt currently requires set-up - CGA with func mobility and set-up - min A with ADLS.  Goals established to assist pt with returning to PLOF regarding ADLs and func mobility.  Pt will benefit from skilled OT services in order to increase her level of safety and independence with ADLs and mobility.      Radha Tay, OT  5/27/2023

## 2023-05-27 NOTE — PT/OT/SLP EVAL
Physical Therapy Evaluation/Treatment Note    Patient Name:  Gerda Lai   MRN:  [id number]  Admit Date: 5/26/2023  Admitting Diagnosis: Primary osteoarthritis of right knee  Recent Surgeries: ARTHROPLASTY, KNEE, TOTAL, USING COMPUTER-ASSISTED NAVIGATION: QUYEN: RIGHT: MARTHA - TRIATHALON (Right    General Precautions: Standard, fall   Orthopedic Precautions: RLE weight bearing as tolerated   Braces: N/A    Recommendations:     Discharge Recommendations: home health PT   Level of Assistance Recommended: Intermittent assistance   Discharge Equipment Recommendations: none   Barriers to discharge: Decreased caregiver support, Inaccessible home environment    Assessment:     Gerda Lai is a 89 y.o. female admitted with a medical diagnosis of Primary osteoarthritis of right knee. Performance deficits affecting function  weakness, impaired endurance, impaired self care skills, impaired functional mobility, gait instability, impaired balance, decreased lower extremity function, decreased safety awareness, pain, decreased ROM, edema, orthopedic precautions. Patient agreeable to PT evaluation and treatment this AM. Patient reports being Mod I for mobility and ADLs using RW for ambulation within home and Hurrycane/rollator outside of home. Patient currently functioning below baseline level of mobility requiring SBA for safety during functional transfers and ambulation and CGA for curb step training and ambulation on unlevel surfaces. Patient reports having a 6-8 inch curb step to enter home. Patient pleasant and motivated to engage in therapy. Patient will benefit from continued SNF rehabilitation services to address above mentioned deficits as well as progress mobility towards PLOF for safe transition to home environment at time of discharge.     Rehab Potential is good     Activity Tolerance: Fair    Plan:     Patient to be seen 5 x/week to address the above listed problems via gait training, therapeutic activities,  "therapeutic exercises, neuromuscular re-education, wheelchair management/training     Plan of Care Expires: 06/26/23  Plan of Care Reviewed with: patient    Subjective     Chief Complaint: "It hurts when I try to move it myself."  Patient/Family Comments/goals: Return home at [facility].   Pain/Comfort:  Pain Rating 1: 1/10  Location - Side 1: Right  Location - Orientation 1: generalized  Location 1: knee  Pain Addressed 1: Pre-medicate for activity, Reposition, Distraction, Cessation of Activity  Pain Rating Post-Intervention 1:  (Pain level not quantified. Patient with facial grimacing with RLE exercises/ROM.)    Living Environment:   Patient resides home alone in [facility] with 1 JUNO. Patient has a WIS with shower chair and grab bars.     Prior to admission, patients level of function was Mod I using RW for mobility within home and Hurrycane or rollator for mobility outside of home. Patient reports driving prior to recent surgery.  Equipment used at home: RW, rollator, hurrycane, BSC, grab bars, shower chair .  DME owned (not currently used): none.  Upon discharge, patient will have assistance from nieces as needed.    Patients cultural, spiritual, Roman Catholic conflicts given the current situation: no    Objective:     Communicated with nursing staff prior to session.  Patient found up in chair with  (no active lines)  upon PT entry to room.    Exams:  Cognitive Exam:  Patient is oriented to Person, Place, Time, and Situation  RLE ROM: Mild extension lag present; unable to accurately measure with pants donned. Patient with 95 degrees AROM in knee flexion.   RLE Strength: Grossly limited AROM; pain with attempts at MMT  LLE ROM: WFL  LLE Strength: WFL    Functional Mobility:   05/27/23 1537   Prior Functioning: Everyday Activities   Self Care Independent   Indoor Mobility (Ambulation) Independent   Stairs Independent  (Curb step to enter home)   Functional Cognition Independent   Prior Device Use Walker  (RW within home; " Hurrycane/rollator outside of home.)   Roll Left and Right   Assistance Needed Set-up / clean-up   Physical Assistance Level No physical assistance   Comment Supervision on level mat without railings.   CARE Score - Roll Left and Right 5   Sit to Lying   Assistance Needed Supervision   Physical Assistance Level No physical assistance   Comment SBA on level mat without railings.   CARE Score - Sit to Lying 4   Lying to Sitting on Side of Bed   Assistance Needed Supervision   Physical Assistance Level No physical assistance   Comment SBA on level mat without railings.   CARE Score - Lying to Sitting on Side of Bed 4   Sit to Stand   Assistance Needed Supervision   Physical Assistance Level No physical assistance   Comment SBA using RW   CARE Score - Sit to Stand 4   Sit to Stand Discharge Goal   Discharge Goal 6   Chair/Bed-to-Chair Transfer   Assistance Needed Supervision   Physical Assistance Level No physical assistance   Comment SBA using RW   CARE Score - Chair/Bed-to-Chair Transfer 4   Car Transfer   Reason if not Attempted Environmental limitations   CARE Score - Car Transfer 10   Walk 10 Feet   Assistance Needed Supervision   Physical Assistance Level No physical assistance   Comment Patient ambulated 96 feet using RW with SBA. No LOB. Patient attempting to achieve step through gait pattern throughout.   CARE Score - Walk 10 Feet 4   Walk 50 Feet with Two Turns   Assistance Needed Supervision   Physical Assistance Level No physical assistance   Comment Patient ambulated 96 feet using RW with SBA. No LOB. Patient attempting to achieve step through gait pattern throughout.   CARE Score - Walk 50 Feet with Two Turns 4   Walk 150 Feet   Comment Patient with fatigue after 96 feet of ambulation.   Reason if not Attempted Safety concerns   CARE Score - Walk 150 Feet 88   Walking 10 Feet on Uneven Surfaces   Assistance Needed Incidental touching   Physical Assistance Level No physical assistance   Comment CGA to  ambulate on unlevel mat using RW.   CARE Score - Walking 10 Feet on Uneven Surfaces 4   1 Step (Curb)   Assistance Needed Incidental touching   Physical Assistance Level No physical assistance   Comment CGA to ascend/descend 4 inch curb step using RW.   CARE Score - 1 Step (Curb) 4   4 Steps   Reason if not Attempted Safety concerns   CARE Score - 4 Steps 88   12 Steps   Reason if not Attempted Safety concerns   CARE Score - 12 Steps 88   Picking Up Object   Assistance Needed Supervision   Physical Assistance Level No physical assistance   Comment SBA using RW and reacher.   CARE Score - Picking Up Object 4   Uses a Wheelchair/Scooter?   Uses a Wheelchair/Scooter? Yes   Wheel 50 Feet with Two Turns   Comment Patient reports UE fatigue after 15 feet of propulsion.   Reason if not Attempted Safety concerns   CARE Score - Wheel 50 Feet with Two Turns 88   Type of Wheelchair/Scooter Manual   Wheel 150 Feet   Reason if not Attempted Safety concerns   CARE Score - Wheel 150 Feet 88     Education:  Patient provided with daily orientation and goals of this PT session.  Patient educated to transfer to bedside chair/bedside commode/bathroom with RN/PCT present.   Patient educated on assistive device use, bed mobility training, Fall risk, gait training, home safety, Home exercise program, plan of care, stair training, and transfer training by explanation and demonstration.   Patient Verbalized Understanding and Demonstrated Understanding .  Time provided for therapeutic counseling and discussion of current health disposition. All questions answered to satisfaction, within scope of PT practice; voiced no other concerns. White board updated in patient's room, RN notified of session.      Therapeutic Activities and Exercises:   Seated BLE exercises 2 x 10 reps including ankle DF/PF, LAQs (increased time for RLE), hip flexion (AAROM for RLE).    Supine RLE exercises x 10 reps including quad sets and SLR with PT assistance.      Repeated functional transfers performed throughout session. See above chart for details.    AM-PAC 6 CLICK MOBILITY  Total Score:17     Patient left up in chair (bedside chair) with call button in reach and nursing staff notified.    GOALS:   Multidisciplinary Problems       Physical Therapy Goals          Problem: Physical Therapy    Goal Priority Disciplines Outcome Goal Variances Interventions   Physical Therapy Goal     PT, PT/OT Ongoing, Progressing     Description: Goals to be met by: 23     Patient will increase functional independence with mobility by performin. Supine to sit with Modified [place]  2. Sit to supine with Modified [place]  3. Rolling to Left and Right with [place]  4. Sit to stand transfer with Modified [place]  5. Bed to chair transfer with Modified [place] using Rolling Walker  6. Gait  x 150 feet with Supervision using Rolling Walker  7. Wheelchair propulsion x 50 feet with Supervision using bilateral upper extremities  8. Ascend/Descend 6 inch/8 inch curb step with Stand-by Assistance using Rolling Walker  9. Lower extremity exercise program 3 x 10 reps per handout, with assistance as needed                         History:     Past Medical History:   Diagnosis Date    Anxiety     Arthritis     Basal cell carcinoma 2016    right post auricular neck     Basal cell carcinoma of right forearm 2023    R lower forearm    Breast cancer 1992    right    Cataract     Fibromyalgia     History of measles as a child     Hyperlipidemia     Hypertension     Personal history of colonic polyps     Pneumonia     SCC (squamous cell carcinoma)     R chest    SCC (squamous cell carcinoma)     left medial shoulder    SCC (squamous cell carcinoma)     left knee    SCC (squamous cell carcinoma)     L upper chest, R upper arm KA types    Shingles     Squamous cell carcinoma 2015    right forearm    Thyroid disease     Vaginitis        Past  Surgical History:   Procedure Laterality Date    ADENOIDECTOMY      ARTHROPLASTY, KNEE, TOTAL, USING COMPUTER-ASSISTED NAVIGATION Right 5/22/2023    Procedure: ARTHROPLASTY, KNEE, TOTAL, USING COMPUTER-ASSISTED NAVIGATION: QUYEN: RIGHT: MARTHA - TRIATHALON;  Surgeon: Milton Cobian III, MD;  Location: [facility] OR;  Service: Orthopedics;  Laterality: Right;    BREAST BIOPSY Left     Excisional bx, benign    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    CARPAL TUNNEL RELEASE Right 3/16/2021    Procedure: RELEASE, CARPAL TUNNEL;  Surgeon: Barbara Aldridge MD;  Location: [facility] OR;  Service: Orthopedics;  Laterality: Right;    CATARACT EXTRACTION W/  INTRAOCULAR LENS IMPLANT Bilateral     EPIDURAL STEROID INJECTION N/A 2/24/2023    Procedure: HEIDI C7-T1;  Surgeon: Andi Cisneros DO;  Location: [facility] OR;  Service: Pain Management;  Laterality: N/A;    EYE SURGERY      HAND SURGERY      INJECTION OF ANESTHETIC AGENT AROUND MEDIAL BRANCH NERVES INNERVATING CERVICAL FACET JOINT N/A 1/4/2022    Procedure: Cervical Medial Branch Block C5-6, C6-7 (No Sedation);  Surgeon: Himanshu Blackmon Jr., MD;  Location: [facility] PAIN MGT;  Service: Pain Management;  Laterality: N/A;    INJECTION OF ANESTHETIC AGENT AROUND MEDIAL BRANCH NERVES INNERVATING LUMBAR FACET JOINT Left 11/18/2021    Procedure: Cervical Medial Branch Block Left C5-6, C6-7 (IV Sedation);  Surgeon: Himanshu Blackmon Jr., MD;  Location: [facility] PAIN MGT;  Service: Pain Management;  Laterality: Left;    JOINT REPLACEMENT Right     ELZBIETA    KNEE ARTHROPLASTY Left 8/10/2020    Procedure: ARTHROPLASTY, KNEE-ADE NEXGEN/CEMENTED TIBIA;  Surgeon: Kalin Pacheco MD;  Location: [facility] OR;  Service: Orthopedics;  Laterality: Left;    RADIOFREQUENCY THERMAL COAGULATION OF MEDIAL BRANCH OF POSTERIOR RAMUS OF CERVICAL SPINAL NERVE Left 3/8/2022    Procedure: RADIOFREQUENCY THERMAL COAGULATION, NERVE, SPINAL, CERVICAL, LEFT C5-6 AND C6-7;  Surgeon: Himanshu Blackmon Jr., MD;  Location: [facility]  PAIN MGT;  Service: Pain Management;  Laterality: Left;  NO PACEMAKER   ASA    thyriodectomy      partial    tonsillectomy      TONSILLECTOMY      TOTAL HIP ARTHROPLASTY      right    Yag  Left        Time Tracking:     PT Received On: 05/27/23  PT Start Time: 0941     PT Stop Time: 1022  PT Total Time (min): 41 min     Billable Minutes: Evaluation 15, Therapeutic Activity 11, and Therapeutic Exercise 15      05/27/2023

## 2023-05-27 NOTE — PT/OT/SLP EVAL
"Occupational Therapy   Evaluation    Name: Gerda Lai  MRN: [id number]  Admit Date: 5/26/2023  Recent Surgeries: ARTHROPLASTY, KNEE, TOTAL, USING COMPUTER-ASSISTED NAVIGATION: QUYEN: RIGHT: MARTHA - TRIATHALON (Right)     General Precautions: Standard, fall  Orthopedic Precautions:RLE weight bearing as tolerated   Braces: N/A    Recommendations:     Discharge Recommendations: home health OT  Level of Assistance Recommended: Intermittent supervision  Discharge Equipment Recommendations:  none  Barriers to discharge:  Decreased caregiver support    Assessment:     Gerda Lai is a 89 y.o. female with a medical diagnosis of Primary osteoarthritis of right knee.  She presents with the following performance deficits affecting function: impaired endurance, weakness, impaired self care skills, gait instability, impaired balance, decreased coordination, decreased upper extremity function, decreased lower extremity function, decreased safety awareness, decreased ROM, impaired coordination, impaired skin, edema, orthopedic precautions.  Pt was agreeable to and participated in OT evaluation.  Pt lives alone and completed all func mobility skills and ADLS with mod I at [facility].  Pt currently requires set-up - CGA with func mobility and set-up - min A with ADLS.  Goals established to assist pt with returning to [facility] regarding ADLs and func mobility.  Pt will benefit from skilled OT services in order to increase her level of safety and independence with ADLs and mobility.      Rehab Potential is excellent    Activity Tolerance: Good    Plan:     Patient to be seen 5 x/week to address the above listed problems via self-care/home management, therapeutic activities, therapeutic exercises  Plan of Care Expires: 06/26/23  Plan of Care Reviewed with: patient    Subjective     Chief Complaint: none given  Patient/Family Comments/goals: "to be able to move R leg and walk independently"    Occupational Profile:  Living Environment: pt " lives alone in [facility] [place] [place] with THE entrance - WIS with bench and GB  Previous level of function: mod I with mobility and ADLs - drives - cooks - cleans  Equipment Used at Home: bedside commode, cane, quad, cane, straight, shower chair, rollator, walker, rolling, grab bar  Assistance upon Discharge: nieces can assist if needed    Pain/Comfort:  Pain Rating 1: 0/10  Pain Rating Post-Intervention 1: 0/10    Patients cultural, spiritual, Pentecostal conflicts given the current situation: no    Objective:     Communicated with: nurse prior to session.  Patient found HOB elevated with cryotherapy upon OT entry to room.    Occupational Performance:        05/27/23 1102   Eating   Assistance Needed Independent   CARE Score - Eating 6   Oral Hygiene   Assistance Needed Incidental touching   Comment standing at sink to complete all grooming with set-up to CGA - stood for over 10' total, but sat for rest break within 10'   CARE Score - Oral Hygiene 4   Toileting Hygiene   Assistance Needed Incidental touching   Comment CGA at toilet while standing for pt to complete grooming   CARE Score - Toileting Hygiene 4   Toileting Hygiene Discharge Goal   Discharge Goal 6   Shower/Bathe Self   Assistance Needed Physical assistance   Physical Assistance Level 25% or less   Comment sponge bath sitting at sink - needed A to wash B feet   CARE Score - Shower/Bathe Self 3   Upper Body Dressing   Assistance Needed Set-up / clean-up   Comment set up A - sitting in w/c   CARE Score - Upper Body Dressing 5   Lower Body Dressing   Assistance Needed Set-up / clean-up   Comment set up A - CGA to anya pants and underwear   CARE Score - Lower Body Dressing 5   Putting On/Taking Off Footwear   Assistance Needed Physical assistance   Physical Assistance Level 25% or less   Comment Set up to anya slip on shoes - set up to doff socks - min A to anya socks   CARE Score - Putting On/Taking Off Footwear 3   Roll Left and Right   Assistance Needed Independent  "  Comment mod I   CARE Score - Roll Left and Right 6   Lying to Sitting on Side of Bed   Assistance Needed Independent   Comment mod I   CARE Score - Lying to Sitting on Side of Bed 6   Sit to Stand   Assistance Needed Supervision   Comment SBA with RW   CARE Score - Sit to Stand 4   Toilet Transfer   Assistance Needed Incidental touching   Comment CGA with RW   CARE Score - Toilet Transfer 4     Functional Mobility/Transfers:  Functional Mobility: pt able to walk within her room using RW with CGA only    Cognitive/Visual Perceptual:  Cognitive/Psychosocial Skills:     -       Oriented to: Person, Place, Time, and Situation   -       Follows Commands/attention:Follows two-step commands  -       Communication: clear/fluent  -       Memory: No Deficits noted  -       Safety awareness/insight to disability: impaired   -       Mood/Affect/Coping skills/emotional control: Appropriate to situation    Physical Exam:  Balance: -       Sitting = good;  Standing = good using RW  Postural examination/scapula alignment:    -       Rounded shoulders  Skin integrity: incision on R knee  Edema:  Mild R LE - wearing compressing stocking - thigh high  Sensation: -       Intact  Dominant hand: -       right  Upper Extremity Range of Motion:     -       Right Upper Extremity: WFL except shoulder due to RTC "issues" - only ~10* AROM but performed SROM to lift arm to wash, apply deodorant, and dress  -       Left Upper Extremity: WFL  Upper Extremity Strength:    -       Right Upper Extremity: WFL except shoulder  -       Left Upper Extremity: WFL   Strength:    -       Right Upper Extremity: WFL  -       Left Upper Extremity: WFL    AMPAC 6 Click ADL:  AMPAC Total Score: 19    Treatment & Education:  Pt completed ADLs and func mobility activities for tx session as noted above  Whiteboard updated  Pt educated on role of OT and POC      Patient left up in chair with call button in reach    GOALS:   Multidisciplinary Problems       " Occupational Therapy Goals          Problem: Occupational Therapy    Goal Priority Disciplines Outcome Interventions   Occupational Therapy Goal     OT, PT/OT Ongoing, Progressing    Description: Goals to be met by: 06/27/23     Patient will increase functional independence with ADLs by performing:    UE Dressing with Modified [place].  LE Dressing with Modified [place].  Grooming while standing at sink with Modified [place].  Toileting from toilet with Modified [place] for hygiene and clothing management.   Bathing from  sitting at sink with Modified [place].  Toilet transfer to toilet with Modified [place].  Upper extremity exercise program 3x15 reps per handout, with independence.                         History:     Past Medical History:   Diagnosis Date    Anxiety     Arthritis     Basal cell carcinoma 09/2016    right post auricular neck     Basal cell carcinoma of right forearm 04/19/2023    R lower forearm    Breast cancer 1992    right    Cataract     Fibromyalgia     History of measles as a child     Hyperlipidemia     Hypertension     Personal history of colonic polyps     Pneumonia     SCC (squamous cell carcinoma) 2015    R chest    SCC (squamous cell carcinoma) 2016    left medial shoulder    SCC (squamous cell carcinoma) 2017    left knee    SCC (squamous cell carcinoma) 2022    L upper chest, R upper arm KA types    Shingles     Squamous cell carcinoma 2015    right forearm    Thyroid disease     Vaginitis          Past Surgical History:   Procedure Laterality Date    ADENOIDECTOMY      ARTHROPLASTY, KNEE, TOTAL, USING COMPUTER-ASSISTED NAVIGATION Right 5/22/2023    Procedure: ARTHROPLASTY, KNEE, TOTAL, USING COMPUTER-ASSISTED NAVIGATION: QUYEN: RIGHT: MARTHA - IDANIA;  Surgeon: Milton Cobian III, MD;  [facility]: [facility];  Service: Orthopedics;  Laterality: Right;    BREAST BIOPSY Left     Excisional bx, benign    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    CARPAL  TUNNEL RELEASE Right 3/16/2021    Procedure: RELEASE, CARPAL TUNNEL;  Surgeon: Barbara Aldridge MD;  Location: [facility] OR;  Service: Orthopedics;  Laterality: Right;    CATARACT EXTRACTION W/  INTRAOCULAR LENS IMPLANT Bilateral     EPIDURAL STEROID INJECTION N/A 2/24/2023    Procedure: HEIDI C7-T1;  Surgeon: Andi Cisneros DO;  Location: [facility] OR;  Service: Pain Management;  Laterality: N/A;    EYE SURGERY      HAND SURGERY      INJECTION OF ANESTHETIC AGENT AROUND MEDIAL BRANCH NERVES INNERVATING CERVICAL FACET JOINT N/A 1/4/2022    Procedure: Cervical Medial Branch Block C5-6, C6-7 (No Sedation);  Surgeon: Himanshu Blackmon Jr., MD;  Location: [facility] PAIN MGT;  Service: Pain Management;  Laterality: N/A;    INJECTION OF ANESTHETIC AGENT AROUND MEDIAL BRANCH NERVES INNERVATING LUMBAR FACET JOINT Left 11/18/2021    Procedure: Cervical Medial Branch Block Left C5-6, C6-7 (IV Sedation);  Surgeon: Himanshu Blackmon Jr., MD;  Location: [facility] PAIN MGT;  Service: Pain Management;  Laterality: Left;    JOINT REPLACEMENT Right     ELZBIETA    KNEE ARTHROPLASTY Left 8/10/2020    Procedure: ARTHROPLASTY, KNEE-ADE NEXGEN/CEMENTED TIBIA;  Surgeon: Kalin Pacheco MD;  Location: [facility] OR;  Service: Orthopedics;  Laterality: Left;    RADIOFREQUENCY THERMAL COAGULATION OF MEDIAL BRANCH OF POSTERIOR RAMUS OF CERVICAL SPINAL NERVE Left 3/8/2022    Procedure: RADIOFREQUENCY THERMAL COAGULATION, NERVE, SPINAL, CERVICAL, LEFT C5-6 AND C6-7;  Surgeon: Himanshu Blackmon Jr., MD;  Location: [facility] PAIN MGT;  Service: Pain Management;  Laterality: Left;  NO PACEMAKER   ASA    thyriodectomy      partial    tonsillectomy      TONSILLECTOMY      TOTAL HIP ARTHROPLASTY      right    Yag  Left        Time Tracking:     OT Date of Treatment: 05/27/23  OT Start Time: 0749  OT Stop Time: 0842  OT Total Time (min): 53 min    Billable Minutes:Evaluation 15  Self Care/Home Management 38    5/27/2023

## 2023-05-28 PROCEDURE — 11000004 HC SNF PRIVATE

## 2023-05-28 PROCEDURE — 25000003 PHARM REV CODE 250: Performed by: HOSPITALIST

## 2023-05-28 RX ADMIN — DOCUSATE SODIUM 100 MG: 100 CAPSULE, LIQUID FILLED ORAL at 08:05

## 2023-05-28 RX ADMIN — AMLODIPINE BESYLATE 5 MG: 5 TABLET ORAL at 08:05

## 2023-05-28 RX ADMIN — Medication 100 MG: at 08:05

## 2023-05-28 RX ADMIN — LORAZEPAM 0.5 MG: 0.5 TABLET ORAL at 08:05

## 2023-05-28 RX ADMIN — SENNOSIDES AND DOCUSATE SODIUM 1 TABLET: 50; 8.6 TABLET ORAL at 09:05

## 2023-05-28 RX ADMIN — FAMOTIDINE 20 MG: 20 TABLET ORAL at 08:05

## 2023-05-28 RX ADMIN — CEFADROXIL 500 MG: 500 CAPSULE ORAL at 09:05

## 2023-05-28 RX ADMIN — ALISKIREN HEMIFUMARATE 300 MG: 150 TABLET, FILM COATED ORAL at 08:05

## 2023-05-28 RX ADMIN — ACETAMINOPHEN 650 MG: 325 TABLET ORAL at 05:05

## 2023-05-28 RX ADMIN — LABETALOL HYDROCHLORIDE 100 MG: 100 TABLET, FILM COATED ORAL at 08:05

## 2023-05-28 RX ADMIN — MUPIROCIN: 20 OINTMENT TOPICAL at 08:05

## 2023-05-28 RX ADMIN — ASPIRIN 81 MG: 81 TABLET, COATED ORAL at 08:05

## 2023-05-28 RX ADMIN — CHOLECALCIFEROL TAB 25 MCG (1000 UNIT) 1000 UNITS: 25 TAB at 08:05

## 2023-05-28 RX ADMIN — SENNOSIDES AND DOCUSATE SODIUM 1 TABLET: 50; 8.6 TABLET ORAL at 08:05

## 2023-05-28 RX ADMIN — CEFADROXIL 500 MG: 500 CAPSULE ORAL at 08:05

## 2023-05-28 RX ADMIN — PRAVASTATIN SODIUM 80 MG: 20 TABLET ORAL at 08:05

## 2023-05-28 RX ADMIN — DOCUSATE SODIUM 100 MG: 100 CAPSULE, LIQUID FILLED ORAL at 09:05

## 2023-05-28 RX ADMIN — ACETAMINOPHEN 650 MG: 325 TABLET ORAL at 09:05

## 2023-05-28 RX ADMIN — THERA TABS 1 TABLET: TAB at 08:05

## 2023-05-28 RX ADMIN — ACETAMINOPHEN 650 MG: 325 TABLET ORAL at 01:05

## 2023-05-28 RX ADMIN — MUPIROCIN: 20 OINTMENT TOPICAL at 04:05

## 2023-05-29 LAB
ANION GAP SERPL CALC-SCNC: 7 MMOL/L (ref 8–16)
BASOPHILS # BLD AUTO: 0.03 K/UL (ref 0–0.2)
BASOPHILS NFR BLD: 0.4 % (ref 0–1.9)
BUN SERPL-MCNC: 27 MG/DL (ref 8–23)
CALCIUM SERPL-MCNC: 9.6 MG/DL (ref 8.7–10.5)
CHLORIDE SERPL-SCNC: 108 MMOL/L (ref 95–110)
CO2 SERPL-SCNC: 25 MMOL/L (ref 23–29)
CREAT SERPL-MCNC: 0.9 MG/DL (ref 0.5–1.4)
DIFFERENTIAL METHOD: ABNORMAL
EOSINOPHIL # BLD AUTO: 0.3 K/UL (ref 0–0.5)
EOSINOPHIL NFR BLD: 3.8 % (ref 0–8)
ERYTHROCYTE [DISTWIDTH] IN BLOOD BY AUTOMATED COUNT: 13.2 % (ref 11.5–14.5)
EST. GFR  (NO RACE VARIABLE): >60 ML/MIN/1.73 M^2
GLUCOSE SERPL-MCNC: 106 MG/DL (ref 70–110)
HCT VFR BLD AUTO: 28.2 % (ref 37–48.5)
HGB BLD-MCNC: 9 G/DL (ref 12–16)
IMM GRANULOCYTES # BLD AUTO: 0.03 K/UL (ref 0–0.04)
IMM GRANULOCYTES NFR BLD AUTO: 0.4 % (ref 0–0.5)
LYMPHOCYTES # BLD AUTO: 2.3 K/UL (ref 1–4.8)
LYMPHOCYTES NFR BLD: 30.2 % (ref 18–48)
MAGNESIUM SERPL-MCNC: 1.9 MG/DL (ref 1.6–2.6)
MCH RBC QN AUTO: 32.4 PG (ref 27–31)
MCHC RBC AUTO-ENTMCNC: 31.9 G/DL (ref 32–36)
MCV RBC AUTO: 101 FL (ref 82–98)
MONOCYTES # BLD AUTO: 0.9 K/UL (ref 0.3–1)
MONOCYTES NFR BLD: 12 % (ref 4–15)
NEUTROPHILS # BLD AUTO: 4 K/UL (ref 1.8–7.7)
NEUTROPHILS NFR BLD: 53.2 % (ref 38–73)
NRBC BLD-RTO: 0 /100 WBC
PHOSPHATE SERPL-MCNC: 4.4 MG/DL (ref 2.7–4.5)
PLATELET # BLD AUTO: 231 K/UL (ref 150–450)
PMV BLD AUTO: 8.9 FL (ref 9.2–12.9)
POTASSIUM SERPL-SCNC: 4 MMOL/L (ref 3.5–5.1)
RBC # BLD AUTO: 2.78 M/UL (ref 4–5.4)
SODIUM SERPL-SCNC: 140 MMOL/L (ref 136–145)
WBC # BLD AUTO: 7.58 K/UL (ref 3.9–12.7)

## 2023-05-29 PROCEDURE — 36415 COLL VENOUS BLD VENIPUNCTURE: CPT | Performed by: HOSPITALIST

## 2023-05-29 PROCEDURE — 25000003 PHARM REV CODE 250: Performed by: HOSPITALIST

## 2023-05-29 PROCEDURE — 80048 BASIC METABOLIC PNL TOTAL CA: CPT | Performed by: HOSPITALIST

## 2023-05-29 PROCEDURE — 97530 THERAPEUTIC ACTIVITIES: CPT | Mod: CQ

## 2023-05-29 PROCEDURE — 84100 ASSAY OF PHOSPHORUS: CPT | Performed by: HOSPITALIST

## 2023-05-29 PROCEDURE — 97116 GAIT TRAINING THERAPY: CPT | Mod: CQ

## 2023-05-29 PROCEDURE — 11000004 HC SNF PRIVATE

## 2023-05-29 PROCEDURE — 94761 N-INVAS EAR/PLS OXIMETRY MLT: CPT

## 2023-05-29 PROCEDURE — 97530 THERAPEUTIC ACTIVITIES: CPT | Mod: CO

## 2023-05-29 PROCEDURE — 85025 COMPLETE CBC W/AUTO DIFF WBC: CPT | Performed by: HOSPITALIST

## 2023-05-29 PROCEDURE — 83735 ASSAY OF MAGNESIUM: CPT | Performed by: HOSPITALIST

## 2023-05-29 PROCEDURE — 97110 THERAPEUTIC EXERCISES: CPT | Mod: CO

## 2023-05-29 PROCEDURE — 97110 THERAPEUTIC EXERCISES: CPT | Mod: CQ

## 2023-05-29 RX ADMIN — ASPIRIN 81 MG: 81 TABLET, COATED ORAL at 11:05

## 2023-05-29 RX ADMIN — Medication 100 MG: at 11:05

## 2023-05-29 RX ADMIN — ACETAMINOPHEN 650 MG: 325 TABLET ORAL at 09:05

## 2023-05-29 RX ADMIN — ACETAMINOPHEN 650 MG: 325 TABLET ORAL at 06:05

## 2023-05-29 RX ADMIN — CEFADROXIL 500 MG: 500 CAPSULE ORAL at 11:05

## 2023-05-29 RX ADMIN — CEFADROXIL 500 MG: 500 CAPSULE ORAL at 09:05

## 2023-05-29 RX ADMIN — SENNOSIDES AND DOCUSATE SODIUM 1 TABLET: 50; 8.6 TABLET ORAL at 11:05

## 2023-05-29 RX ADMIN — LABETALOL HYDROCHLORIDE 100 MG: 100 TABLET, FILM COATED ORAL at 11:05

## 2023-05-29 RX ADMIN — LABETALOL HYDROCHLORIDE 100 MG: 100 TABLET, FILM COATED ORAL at 09:05

## 2023-05-29 RX ADMIN — THERA TABS 1 TABLET: TAB at 11:05

## 2023-05-29 RX ADMIN — ASPIRIN 81 MG: 81 TABLET, COATED ORAL at 09:05

## 2023-05-29 RX ADMIN — LORAZEPAM 0.5 MG: 0.5 TABLET ORAL at 09:05

## 2023-05-29 RX ADMIN — ALISKIREN HEMIFUMARATE 300 MG: 150 TABLET, FILM COATED ORAL at 11:05

## 2023-05-29 RX ADMIN — MUPIROCIN: 20 OINTMENT TOPICAL at 11:05

## 2023-05-29 RX ADMIN — SPIRONOLACTONE 25 MG: 25 TABLET, FILM COATED ORAL at 06:05

## 2023-05-29 RX ADMIN — SENNOSIDES AND DOCUSATE SODIUM 1 TABLET: 50; 8.6 TABLET ORAL at 09:05

## 2023-05-29 RX ADMIN — AMLODIPINE BESYLATE 5 MG: 5 TABLET ORAL at 11:05

## 2023-05-29 RX ADMIN — ACETAMINOPHEN 650 MG: 325 TABLET ORAL at 02:05

## 2023-05-29 RX ADMIN — CHOLECALCIFEROL TAB 25 MCG (1000 UNIT) 1000 UNITS: 25 TAB at 11:05

## 2023-05-29 RX ADMIN — PRAVASTATIN SODIUM 80 MG: 20 TABLET ORAL at 11:05

## 2023-05-29 RX ADMIN — FAMOTIDINE 20 MG: 20 TABLET ORAL at 11:05

## 2023-05-29 NOTE — PLAN OF CARE
Continue regular diet, encourage intake,educate on protein needs for healing,RD following  Goals: PO to meet 85% of EEN/EPN by next RD follow up  Nutrition Goal Status: new  Communication of RD Recs: other (comment) (POC)     Assessment and Plan  Increased nutrient needs (protein) related to wound healing as evidenced by s/p TKR     New     Plan     Nutrition education-protein needs  Collaboration with other providers  General diet  Multivitamin/mineral supplement therapy MVI  Mineral supplement therapy- Vit D

## 2023-05-29 NOTE — CONSULTS
[facility] - Skilled Nursing  Adult Nutrition  Consult Note    SUMMARY   Recommendations  Continue regular diet, encourage intake,educate on protein needs for healing,RD following  Goals: PO to meet 85% of EEN/EPN by next RD follow up  Nutrition Goal Status: new  Communication of RD Recs: other (comment) (POC)    Assessment and Plan  Increased nutrient needs (protein) related to wound healing as evidenced by s/p TKR    New    Plan    Nutrition education-protein needs  Collaboration with other providers  General diet  Multivitamin/mineral supplement therapy MVI  Mineral supplement therapy- Vit D    Malnutrition Assessment 5/29     Skin (Micronutrient): wounds unhealed  Eyes (Micronutrient): conjunctiva dull  Neck/Chest (Micronutrient): muscle wasting  Musculoskeletal/Lower Extremities: muscle wasting   Micronutrient Evaluation Summary: no deficiencies   Muscle Mass (Malnutrition): mild depletion   Orbital Region (Subcutaneous Fat Loss): mild depletion  Upper Arm Region (Subcutaneous Fat Loss): well nourished  Thoracic and Lumbar Region: well nourished   [place] Region (Muscle Loss): mild depletion  Clavicle Bone Region (Muscle Loss): well nourished  Clavicle and Acromion Bone Region (Muscle Loss): mild depletion  Dorsal Hand (Muscle Loss): moderate depletion  Anterior Thigh Region (Muscle Loss): mild depletion  Posterior Calf Region (Muscle Loss): mild depletion                 Reason for Assessment  Reason For Assessment: consult  Diagnosis:  (OA of R knee, s/p TKR)  Relevant Medical History: HTN, HLD, CKD 3, CAD, OA, Anxiety, frailty  Interdisciplinary Rounds: attended  General Information Comments: patient visited on IDT rounds, today, NFPE was completed with interview later in day. patient has some outside food, She states she lives alone and does not cook, she makes breakfast or sandwiches, heat items up or picks up food from somewhere. She describes herself as a small eater so likely will not be eating 100%  "of trays. She states no weight loss this past year, no problems chewing or swallowing. She has her own teeth. She has support from her neice. no food allergies. Declines to drink Boost supplement  Nutrition Discharge Planning: Dc on general diet    Nutrition/Diet History  Patient Reported Diet/Restrictions/Preferences: general  Typical Food/Fluid Intake: 2 meals plus 1/2 sandwich at lunch  Food Preferences: no canned fruit send fresh fruit  Spiritual, Cultural Beliefs, Voodoo Practices, Values that Affect Care: no  Food Allergies: NKFA  Factors Affecting Nutritional Intake: None identified at this time    Anthropometrics  Temp: 97.4 °F (36.3 °C)  Height Method: Stated  Height: 5' 3" (160 cm)  Height (inches): 63 in  Weight Method: Standard Scale  Weight: 62.4 kg (137 lb 9.1 oz)  Weight (lb): 137.57 lb  Ideal Body Weight (IBW), Female: 115 lb  % Ideal Body Weight, Female (lb): 119.63 %  BMI (Calculated): 24.4  BMI Grade: 18.5-24.9 - normal  Usual Body Weight (UBW), k kg  % Usual Body Weight: 100.86  % Weight Change From Usual Weight: 0.64 %       Lab/Procedures/Meds    Pertinent Labs Reviewed: reviewed  Pertinent Medications Reviewed: reviewed  Pertinent Medications Comments: spironolactone, senna-docusate, ASA, Vit D, MVI, statin, famotidine, CoQ10     Estimated/Assessed Needs  Weight Used For Calorie Calculations: 62.4 kg (137 lb 9.1 oz)  Energy Calorie Requirements (kcal): 1404  Energy Need Method: Fulton-St Jeor (x 1.4(PAL))  Protein Requirements: 75  Weight Used For Protein Calculations: 62.4 kg (137 lb 9.1 oz) (x 1.2 g/kg)  Fluid Requirements (mL): 1401 or per MD  Estimated Fluid Requirement Method: RDA Method  RDA Method (mL): 1404  CHO Requirement: -    Nutrition Prescription Ordered  Current Diet Order: Regular  Nutrition Order Comments: PO 70%, snacking  Oral Nutrition Supplement: declines    Evaluation of Received Nutrient/Fluid Intake    I/O: no data  Energy Calories Required: meeting " needs  Protein Required: not meeting needs  Fluid Required: meeting needs  Comments: LBM 5/27  Tolerance: tolerating  % Intake of Estimated Energy Needs: 75 - 100 %  % Meal Intake: 50 - 75 %    Nutrition Risk    Level of Risk/Frequency of Follow-up: low (one time per week)     Monitor and Evaluation  Food and Nutrient Intake: food and beverage intake  Food and Nutrient Adminstration: diet order  Knowledge/Beliefs/Attitudes: food and nutrition knowledge/skill  Anthropometric Measurements: weight change  Biochemical Data, Medical Tests and Procedures: gastrointestinal profile, electrolyte and renal panel  Nutrition-Focused Physical Findings: overall appearance, skin     Nutrition Follow-Up  RD Follow-up?: Yes

## 2023-05-29 NOTE — PROGRESS NOTES
"                                                        [facility]                                  Skilled Nursing Facility                   Progress Note     Admit Date: 5/26/2023  LEONARDO TBD  Principal Problem:  Primary osteoarthritis of right knee   HPI obtained from patient interview and chart review     Chief Complaint: Establish Care/ Initial Visit     HPI:   Gerda Lai is a 89 year old female PMHx of CKD stage 3, anxiety, arthritis, HLD, HTN, and Right knee pain who presents to SNF following hospitalization for primary osteoarthritis of right knee s/p right total knee arthroplasty on 05/22/2023 with Dr. Cobian.  Admission to SNF for secondary weakness and debility.    Patient was originally evaluated in orthopedic clinic.  Per notes,   Pain is worse with activity and weight bearing.  Patient has experienced interference of activities of daily living due to decreased range of motion and an increase in joint pain and swelling.  Patient has failed non-operative treatment including NSAIDs, corticosteroid injections, viscosupplement injections, and activity modification.  Gerda Lai currently ambulates using assistive device." Patient taken to the OR on 05/22 for right total knee arthroplasty.  No intraop complications noted,  mL, skin closed with sutures.  Surgical sealant was also placed over the top of the incision and sterile dressing is applied.  Dressing only to be removed by orthopedic surgeon.  Patient will be followed by PT/OT for continued strengthening.    Today, patient states her pain is well controlled.  She has no other complaints at this time.  Social bowel movement today.  Patient states that she lives alone but has help from her nieces and nephews.     Patient will be treated at [facility] with PT and OT to improve functional status and ability to perform ADLs.     Past Medical History: Patient has a past medical history of Anxiety, Arthritis, Basal cell " carcinoma (09/2016), Basal cell carcinoma of right forearm (04/19/2023), Breast cancer (1992), Cataract, Fibromyalgia, History of measles as a child, Hyperlipidemia, Hypertension, Personal history of colonic polyps, Pneumonia, SCC (squamous cell carcinoma) (2015), SCC (squamous cell carcinoma) (2016), SCC (squamous cell carcinoma) (2017), SCC (squamous cell carcinoma) (2022), Shingles, Squamous cell carcinoma (2015), Thyroid disease, and Vaginitis.    Past Surgical History: Patient has a past surgical history that includes thyriodectomy; Total hip arthroplasty; tonsillectomy; Adenoidectomy; Cataract extraction w/  intraocular lens implant (Bilateral); Yag  (Left); Breast lumpectomy (Right, 1992); Breast biopsy (Left); Eye surgery; Tonsillectomy; Joint replacement (Right); Knee Arthroplasty (Left, 8/10/2020); Carpal tunnel release (Right, 3/16/2021); Hand surgery; Injection of anesthetic agent around medial branch nerves innervating lumbar facet joint (Left, 11/18/2021); Injection of anesthetic agent around medial branch nerves innervating cervical facet joint (N/A, 1/4/2022); Radiofrequency thermal coagulation of medial branch of posterior ramus of cervical spinal nerve (Left, 3/8/2022); Epidural steroid injection (N/A, 2/24/2023); and arthroplasty, knee, total, using computer-assisted navigation (Right, 5/22/2023).    Social History: Patient reports that she has never smoked. She has never been exposed to tobacco smoke. She has never used smokeless tobacco. She reports current alcohol use. She reports that she does not use drugs.    Family History: family history includes Breast cancer in her mother; Cancer in her mother and paternal aunt; Glaucoma in her paternal grandmother; Heart disease in her father and paternal aunt; Stroke in her paternal grandfather.    Allergies: Patient is allergic to sulfa (sulfonamide antibiotics), clarithromycin, flexeril [cyclobenzaprine], iodine and iodide containing products,  lisinopril, losartan, metoprolol, spironolactone, tramadol, verapamil (bulk), and voltaren [diclofenac sodium].    ROS  Constitutional: Negative for fever   Eyes: Negative for blurred vision, double vision   Respiratory: Negative for cough, shortness of breath   Cardiovascular: Negative for chest pain, palpitations, and leg swelling.   Gastrointestinal: Negative for abdominal pain, constipation, diarrhea, nausea, vomiting.   Genitourinary: Negative for dysuria, frequency   Musculoskeletal:  + generalized weakness.  +right knee pain  Skin: Negative for itching and rash.   Neurological: Negative for dizziness, headaches.   Psychiatric/Behavioral: Negative for depression. The patient is not nervous/anxious.      24 hour Vital Sign Range   Temp:  [97.3 °F (36.3 °C)-98.2 °F (36.8 °C)]   Pulse:  [67-70]   Resp:  [18]   BP: (137-213)/(79-89)   SpO2:  [97 %-98 %]     Current BMI: Body mass index is 24.37 kg/m².    PEx  Constitutional: Patient appears debilitated.  No distress noted  HENT:   Head: Normocephalic and atraumatic.   Eyes: Pupils are equal, round  Neck: Normal range of motion. Neck supple.   Cardiovascular: Normal rate, regular rhythm and normal heart sounds.    Pulmonary/Chest: Effort normal and breath sounds are clear  Abdominal: Soft. Bowel sounds are normal.   Musculoskeletal: Normal range of motion.   Neurological: Alert and oriented to person, place, and time.   Psychiatric: Normal mood and affect. Behavior is normal.   Skin: Skin is warm and dry. Full skin assessment completed.  Surgical dressing to right knee, only to be removed by Ortho.    No results for input(s): GLUCOSE, NA, K, CL, CO2, BUN, CREATININE, MG in the last 24 hours.    Invalid input(s):  CALCIUM    No results for input(s): WBC, RBC, HGB, HCT, PLT, MCV, MCH, MCHC in the last 24 hours.    No results for input(s): POCTGLUCOSE in the last 168 hours.     Assessment and Plan:    Primary osteoarthritis of right knee  S/p right TKA on 05/22  -  PT/OT, WBAT  - DVT PPX with ASA 81 mg BID times 30 days  - postop prophylactic antibiotics, cefadroxil 500 mg BID x7 days  - ortho JOE to see pt weekly at [facility]  - do not remove dressing at [facility]- will be done after DC or by Ortho  - follow-up with orthopedics after discharge    Acute postoperative pain  - continue acetaminophen 650 mg q.8 hours, oxycodone 5 or 10 mg q.6 hours PRN, methocarbamol 750 mg TID prn    CKD stage 3  - continue monitor twice weekly BMPs, avoid nephrotoxic agents, renally dose medications when appropriate    HTN  - continue amlodipine 5 mg daily (in place of home non formulary felodipine 5 mg daily), labetalol 100 mg BID, Aldactone 25 mg    HLD  - continue pravastatin 80 mg daily    GERD  - continue famotidine 20 mg daily    Debility   - Continue with PT/OT for gait training and strengthening and restoration of ADL's   - Encourage mobility, OOB in chair, and early ambulation as appropriate  - Fall precautions   - Monitor for bowel and bladder dysfunction  - Monitor for and prevent skin breakdown and pressure ulcers  - Continue DVT prophylaxis with ASA 81 mg BID, frequent ambulation      Anticipate disposition:  Home with home health      Follow-up needed during SNF stay-orthopedics (6/6) unless otherwise specified, derm 6/26 if still at [facility]    Follow-up needed after discharge from [facility]: PCP, Orthopedics    Future Appointments   Date Time Provider Department Center   6/6/2023  2:00 PM Stacy Harris NP [facility] ORTHO [address]   6/26/2023  3:00 PM Bouchra Curtis MD [facility] DERM [place]   7/6/2023  3:20 PM Milton Cobian III, MD [facility] ORTHO [address]   7/18/2023  1:30 PM Andi Cisneros DO [facility] [place]   8/15/2023  3:20 PM Milton Cobian III, MD [facility] [place] [address]   9/11/2023  2:45 PM Milton Mandujano MD [facility] DER[place]   11/2/2023  2:00 PM Tomas Beltran MD [facility] IM [place]         I certify that SNF services are required to be given on an inpatient basis because Gerda  JEREMIAH Lai needs for skilled nursing care and/or skilled rehabilitation are required on a daily basis and such services can only practically be provided in a skilled nursing facility setting and are for an ongoing condition for which she received inpatient care in the hospital.     Total time of the visit 120 minutes   Non physical exam/ non charting time: 70 minutes   Description of non physical exam/non charting time: counseling patient on clinical conditions and therapies provided.  Extensive chart review completed including all consultation notes.  All pertinent laboratory and radiographical images reviewed.        Leigh Arango NP  Department of Hospital Medicine   [facility]     DOS: 5/29/2023       Patient note was created using MModal Dictation.  Any errors in syntax or even information may not have been identified and edited on initial review prior to signing this note.

## 2023-05-29 NOTE — CLINICAL REVIEW
Clinical Pharmacy Chart Review Note      Admit Date: 5/26/2023   LOS: 3 days       Gerda Lai is a 89 y.o. female admitted to SNF for PT/OT after hospitalization for primary osteoarthritis of right knee.    Active Hospital Problems    Diagnosis  POA    *Primary osteoarthritis of right knee [M17.11]  Yes    Status post total right knee replacement [Z96.651]  Not Applicable    Macrocytic anemia [D53.9]  Yes    Carotid arterial disease [I77.9]  Yes     Chronic    Anxiety [F41.9]  Yes     Chronic    CKD (chronic kidney disease), stage III [N18.30]  Yes     Chronic    HLD (hyperlipidemia) [E78.5]  Yes     Chronic    Hypertension, essential [I10]  Yes     Chronic     Note that she has an auscultatory gap.  BP is difficult to control and she has been intolerant to several meds.  BP goal is <150/90        Resolved Hospital Problems   No resolved problems to display.     Review of patient's allergies indicates:   Allergen Reactions    Sulfa (sulfonamide antibiotics) Rash    Clarithromycin Other (See Comments)     Weak, extreme fatigue. dizziness    Flexeril [cyclobenzaprine] Other (See Comments)     Dizziness    Iodine and iodide containing products     Lisinopril Other (See Comments)     Cough and sensation of throat swelling/?angioedema    Losartan Rash    Metoprolol Swelling     Tightness in throat    Spironolactone      Throat tightening    Tramadol Other (See Comments)     Dizzy and weak    Verapamil (bulk) Palpitations    Voltaren [diclofenac sodium] Other (See Comments)     Drops blood pressure     Patient Active Problem List    Diagnosis Date Noted    Status post total right knee replacement 05/27/2023    Lower extremity edema 04/25/2023    Acute pain of right lower extremity 07/26/2022    Weakness of both lower extremities 07/26/2022    Right foot pain 07/11/2022    Impaired functional mobility, balance, and endurance 08/20/2021    Risk for falls 07/19/2021    Frail elderly 07/19/2021    Impaired functional  mobility, balance, gait, and endurance 09/02/2020    Primary osteoarthritis of left knee s/p TKA 8/10/20 08/10/2020    Macrocytic anemia 08/06/2020    Carotid arterial disease 07/17/2020    Anxiety 07/06/2020    Subclavian artery stenosis, right 12/04/2019    Abnormal aldosterone/renin ratio 12/04/2019    Osteopenia 02/11/2019    Aortic atherosclerosis 01/24/2019    Other spondylosis with radiculopathy, lumbar region 10/20/2017    History of total right hip replacement 04/21/2017    Osteoarthritis 01/11/2017    Lumbosacral radiculopathy at L5 01/11/2017    Personal history of skin cancer 06/15/2016    Primary osteoarthritis of right knee 01/05/2016    Cervical spondylosis 01/05/2016    Hearing loss, sensorineural 08/04/2015    HLD (hyperlipidemia) 06/14/2015    CKD (chronic kidney disease), stage III 06/14/2015    Personal history of breast cancer 07/17/2012    Hypertension, essential 07/17/2012       Scheduled Meds:    acetaminophen  650 mg Oral Q8H    aliskiren  300 mg Oral Daily    amLODIPine  5 mg Oral Daily    aspirin  81 mg Oral BID    cefadroxil  500 mg Oral Q12H    coenzyme Q10  100 mg Oral Daily    famotidine  20 mg Oral Daily    labetalol  100 mg Oral Q12H    multivitamin  1 tablet Oral Daily    mupirocin   Topical (Top) TID    pravastatin  80 mg Oral Daily    senna-docusate 8.6-50 mg  1 tablet Oral BID    spironolactone  25 mg Oral Every Mon, Wed, Fri    vitamin D  1,000 Units Oral Daily     Continuous Infusions:   PRN Meds: acetaminophen, albuterol, calcium carbonate, LORazepam, LORazepam, melatonin, methocarbamoL, oxyCODONE, oxyCODONE    OBJECTIVE:     Vital Signs (Last 24H)  Temp:  [97.3 °F (36.3 °C)-98.2 °F (36.8 °C)]   Pulse:  [61-70]   Resp:  [18]   BP: (137-213)/(79-89)   SpO2:  [97 %-98 %]     Laboratory:  CBC:   Recent Labs   Lab 05/23/23  0421 05/29/23  0829   WBC  --  7.58   RBC  --  2.78*   HGB  --  9.0*   HCT 33* 28.2*   PLT  --  231   MCV  --  101*   MCH  --  32.4*   MCHC  --  31.9*     BMP:    Recent Labs   Lab 05/29/23  0829         K 4.0      CO2 25   BUN 27*   CREATININE 0.9   CALCIUM 9.6   MG 1.9     CMP:   Recent Labs   Lab 05/29/23  0829      CALCIUM 9.6      K 4.0   CO2 25      BUN 27*   CREATININE 0.9     Lab Results   Component Value Date    ALT 15 05/02/2023    AST 21 05/02/2023    ALKPHOS 75 05/02/2023    BILITOT 1.0 05/02/2023           ASSESSMENT/PLAN:     I have reviewed the medications in compliance with CMS Regulation F756 of the [place]. Based on information gathered, the following items may need to be addressed:    **According to PMH and home medication list, patient takes the following medications at home. These medications are not currently ordered at [facility]:  Felodipine 5 mg daily (non-formulary, currently taking amlodipine)  Omeprazole 20 mg daily (non-formulary, currently taking famotidine)    **Lorazepam is ordered as needed for anxiety. Per CMS guidelines: The timeframe is limited for PRN psychotropic medications, which are not antipsychotic medications, to 14 days, unless a longer timeframe is deemed appropriate by the attending physician or the prescribing practitioner, however, this is a continuation of a home medication order. Monitor use and associated side effects. Consider discontinuation if patient is not using or adverse effects observed.    Medications reviewed by PharmD, please re-consult if needed.      Nancy Loredo, Pharm. D.  Clinical Pharmacist  [facility]

## 2023-05-29 NOTE — PLAN OF CARE
Problem: Occupational Therapy  Goal: Occupational Therapy Goal  Description: Goals to be met by: 06/27/23     Patient will increase functional independence with ADLs by performing:    UE Dressing with Modified [place].  LE Dressing with Modified [place].  Grooming while standing at sink with Modified [place].  Toileting from toilet with Modified [place] for hygiene and clothing management.   Bathing from  sitting at sink with Modified [place].  Toilet transfer to toilet with Modified [place].  Upper extremity exercise program 3x15 reps per handout, with independence.    Outcome: Ongoing, Progressing

## 2023-05-29 NOTE — PT/OT/SLP PROGRESS
Occupational Therapy   Treatment    Name: Gerda Lai  MRN: [id number]  Admit Date: 5/26/2023  Admitting Diagnosis:  Primary osteoarthritis of right knee    General Precautions: Standard, fall   Orthopedic Precautions: RLE weight bearing as tolerated   Braces: N/A    Recommendations:     Discharge Recommendations:  home health OT  Level of Assistance Recommended at Discharge: Intermittent supervision  Discharge Equipment Recommendations: none  Barriers to discharge:  Decreased caregiver support    Assessment:     Gerda Lai is a 89 y.o. female with a medical diagnosis of Primary osteoarthritis of right knee.  She presents with limitations in performance of self-care, functional mobility, and ADLs. Performance deficits affecting function are impaired endurance, weakness, impaired self care skills, gait instability, impaired balance, decreased coordination, decreased upper extremity function, decreased lower extremity function, decreased safety awareness, decreased ROM, impaired coordination, impaired skin, edema, orthopedic precautions. Pt tolerated Tx without incident and is making progress but continues to require assist to perform self care tasks, functional mobility and functional transfers. Pt would continue to benefit from OT intervention to further functional (I)ce and safety.     Rehab Potential is good    Activity tolerance:  Good    Plan:     Patient to be seen 5 x/week to address the above listed problems via self-care/home management, therapeutic activities, therapeutic exercises    Plan of Care Expires: 06/26/23  Plan of Care Reviewed with: patient    Subjective     Communicated with: Nursing prior to session.     Pain/Comfort:  Pain Rating 1: 2/10  Location - Side 1: Right  Location - Orientation 1: generalized  Location 1: knee  Pain Addressed 1: Reposition, Distraction  Pain Rating Post-Intervention 1: 2/10    Patient's cultural, spiritual, Methodist conflicts given the current  situation:  no    Objective:     Patient found up in chair with  (no active lines) upon OT entry to room.    Bed Mobility:    Pt seated in chair at onset of treatment session.     Functional Mobility/Transfers:  Patient completed Sit <> Stand Transfer with stand by assistance  with  rolling walker   Patient completed Chair <> Bedside Chair Step Transfer technique with stand by assistance with rolling walker    AMPAC 6 Click ADL: 19    OT Exercises: Wall Pulleys performed to increase functional ROM for reaching to increase independence with self care tasks.   Pt performed UBE exercise for 10 minutes with Min resistance.  UE exercises performed to increase functional endurance and strength in order increase independence when performing self care tasks, functional ambulation, W/C propulsion, and functional standing activities .    Treatment & Education:  Pt participated in standing activity with sup and RW. Pt at raised counter to perform fine motor and visual scanning activity with focus on standing tolerance, functional reaching, dynamic standing bal, crossing midline, and to promote independence with homemaking and self care tasks. Pt tolerated standing for 5 min and 28 sec     Pt educated on:  - role of OT  - level of assistance  - energy conservation and task modification to maximized independence with ADL's and mobility   -  safety while performing functional transfers and self care tasks  - progress towards OT goals    Patient left up in bedside chair with call button in reach and Nurse present    GOALS:   Multidisciplinary Problems       Occupational Therapy Goals          Problem: Occupational Therapy    Goal Priority Disciplines Outcome Interventions   Occupational Therapy Goal     OT, PT/OT Ongoing, Progressing    Description: Goals to be met by: 06/27/23     Patient will increase functional independence with ADLs by performing:    UE Dressing with Modified [place].  LE Dressing with Modified  [place].  Grooming while standing at sink with Modified [place].  Toileting from toilet with Modified [place] for hygiene and clothing management.   Bathing from  sitting at sink with Modified [place].  Toilet transfer to toilet with Modified [place].  Upper extremity exercise program 3x15 reps per handout, with independence.                         Time Tracking:     OT Date of Treatment: 05/29/23  OT Start Time: 1030    OT Stop Time: 1102  OT Total Time (min): 32 min    Billable Minutes:Therapeutic Activity 15  Therapeutic Exercise 17    5/29/2023  A client care conference was performed between the LOTR and YAMILA, prior to treatment by DRISCOLL, to discuss the patient's status, treatment plan and established goals.

## 2023-05-29 NOTE — PT/OT/SLP PROGRESS
Physical Therapy Treatment    Patient Name:  Gerda Lai   MRN:  [id number]  Admit Date: 5/26/2023  Admitting Diagnosis: Primary osteoarthritis of right knee  Recent Surgeries:   ARTHROPLASTY, KNEE, TOTAL, USING COMPUTER-ASSISTED NAVIGATION: QUYEN: RIGHT: MARTHA - TRIATHALON (Right  General Precautions: Standard, fall  Orthopedic Precautions: RLE weight bearing as tolerated  Braces: N/A    Recommendations:     Discharge Recommendations: home health PT  Level of Assistance Recommended at Discharge: Intermittent assistance   Discharge Equipment Recommendations: none  Barriers to discharge: Decreased caregiver support, Inaccessible home environment    Assessment:     Gerda Lai is a 89 y.o. female admitted with a medical diagnosis of Primary osteoarthritis of right knee .   Pt was agreeable and tolerated session well. Pt reports some stiffness, but improved with activities. Pt increase total gait distance without needing a seated rest breaks. Pt completed therex and curb training with minimal cues. Pt continues to benefit from therapy to achieve highest level of independence prior to discharge.      Performance deficits affecting function: weakness, impaired endurance, impaired self care skills, impaired functional mobility, gait instability, impaired balance, decreased lower extremity function, decreased safety awareness, pain, decreased ROM, edema, orthopedic precautions.    Rehab Potential is good    Activity Tolerance: Good    Plan:     Patient to be seen 5 x/week to address the above listed problems via gait training, therapeutic activities, therapeutic exercises, neuromuscular re-education, wheelchair management/training    Plan of Care Expires: 06/26/23  Plan of Care Reviewed with: patient    Subjective     Pt reports feeling more stiff this AM.     Pain/Comfort:  Pain Rating 1: 2/10  Location - Side 1: Right  Location - Orientation 1: generalized  Location 1: leg  Pain Addressed 1: Reposition  Pain Rating  "Post-Intervention 1: 3/10    Patient's cultural, spiritual, Baptism conflicts given the current situation:  no    Objective:     Patient found HOB elevated with  (no lines) upon PT entry to room.     Therapeutic Activities and Exercises:   Increase time assisting pt getting dressed.  Seated BLE therex x15 reps: ankle DF/PF, LAQ, marching  Mini elliptical x10 mins  5 mins backwards and 5 mins forward   To improve BLE endurance, strength, and ROM  Patient educated on role of therapy, goals of session, and benefits of out of bed mobility.   Instructed on use of call button and importance of calling nursing staff for assistance with mobility   Questions/concerns addressed within PTA scope of practice  Pt verbalized understanding.    Functional Mobility:  Bed Mobility:   Supine to Sit: supervision; HOB elevated  Transfers:   Sit <> Stand Transfer: stand by assistance with rolling walker   Bed >Sink> Chair Transfer: stand by assistance with rolling walker using Step Transfer technique   Gait:  Pt ambulated ~180 ft with  closeSBA  and rolling walker. Wheelchair following  2 sanding rest breaks & no LOB  Gait Deviation(s): occasional unsteady gait with decrease R knee flexion, slight lateral sway to the left to assist with advancing RLE, decrease rupinder, narrow EZ, and decrease step length   Verbal/tactile cues for upright posture and pacing   Wheelchair Propulsion:    Pt propelled Standard wheelchair x ~50 feet on Level tile with  Bilateral upper extremity with Stand-by Assistance.   Curb:  Pt ascended/descended 4" curb step with Rolling Walker with Contact Guard Assistance.   Cues for sequencing  Balance:   Static/Dynamic sitting EOB balance: SBA  Changing shirt and pants   Static standing balance: CGA-SBA  Brushing teeth at sink     AM-PAC 6 CLICK MOBILITY  17    Patient left  up in wheelchair in therapy gym  with  KATRINA present for hand-off    GOALS:   Multidisciplinary Problems       Physical Therapy Goals          " Problem: Physical Therapy    Goal Priority Disciplines Outcome Goal Variances Interventions   Physical Therapy Goal     PT, PT/OT Ongoing, Progressing     Description: Goals to be met by: 23     Patient will increase functional independence with mobility by performin. Supine to sit with Modified [place]  2. Sit to supine with Modified [place]  3. Rolling to Left and Right with [place]  4. Sit to stand transfer with Modified [place]  5. Bed to chair transfer with Modified [place] using Rolling Walker  6. Gait  x 150 feet with Supervision using Rolling Walker  7. Wheelchair propulsion x 50 feet with Supervision using bilateral upper extremities  8. Ascend/Descend 6 inch/8 inch curb step with Stand-by Assistance using Rolling Walker  9. Lower extremity exercise program 3 x 10 reps per handout, with assistance as needed                         Time Tracking:     PT Received On: 23  PT Start Time: 945  PT Stop Time: 1025  PT Total Time (min): 40 min    Billable Minutes: Gait Training 12, Therapeutic Activity 15, and Therapeutic Exercise 13    Treatment Type: Treatment  PT/PTA: PTA     Number of PTA visits since last PT visit: 2023

## 2023-05-30 ENCOUNTER — PATIENT MESSAGE (OUTPATIENT)
Dept: ORTHOPEDICS | Facility: CLINIC | Age: 88
End: 2023-05-30
Payer: MEDICARE

## 2023-05-30 PROCEDURE — 97116 GAIT TRAINING THERAPY: CPT | Mod: CQ

## 2023-05-30 PROCEDURE — 97530 THERAPEUTIC ACTIVITIES: CPT | Mod: CQ

## 2023-05-30 PROCEDURE — 97110 THERAPEUTIC EXERCISES: CPT | Mod: CQ

## 2023-05-30 PROCEDURE — 11000004 HC SNF PRIVATE

## 2023-05-30 PROCEDURE — 97110 THERAPEUTIC EXERCISES: CPT | Mod: CO

## 2023-05-30 PROCEDURE — 25000003 PHARM REV CODE 250: Performed by: HOSPITALIST

## 2023-05-30 PROCEDURE — 97535 SELF CARE MNGMENT TRAINING: CPT | Mod: CO

## 2023-05-30 RX ORDER — AMOXICILLIN 250 MG
1 CAPSULE ORAL 2 TIMES DAILY PRN
Status: DISCONTINUED | OUTPATIENT
Start: 2023-05-30 | End: 2023-06-06 | Stop reason: HOSPADM

## 2023-05-30 RX ADMIN — ACETAMINOPHEN 650 MG: 325 TABLET ORAL at 05:05

## 2023-05-30 RX ADMIN — CEFADROXIL 500 MG: 500 CAPSULE ORAL at 09:05

## 2023-05-30 RX ADMIN — THERA TABS 1 TABLET: TAB at 09:05

## 2023-05-30 RX ADMIN — ACETAMINOPHEN 650 MG: 325 TABLET ORAL at 09:05

## 2023-05-30 RX ADMIN — AMLODIPINE BESYLATE 5 MG: 5 TABLET ORAL at 09:05

## 2023-05-30 RX ADMIN — ASPIRIN 81 MG: 81 TABLET, COATED ORAL at 09:05

## 2023-05-30 RX ADMIN — CHOLECALCIFEROL TAB 25 MCG (1000 UNIT) 1000 UNITS: 25 TAB at 09:05

## 2023-05-30 RX ADMIN — LABETALOL HYDROCHLORIDE 100 MG: 100 TABLET, FILM COATED ORAL at 09:05

## 2023-05-30 RX ADMIN — SENNOSIDES AND DOCUSATE SODIUM 1 TABLET: 50; 8.6 TABLET ORAL at 09:05

## 2023-05-30 RX ADMIN — ALISKIREN HEMIFUMARATE 300 MG: 150 TABLET, FILM COATED ORAL at 09:05

## 2023-05-30 RX ADMIN — PRAVASTATIN SODIUM 80 MG: 20 TABLET ORAL at 09:05

## 2023-05-30 RX ADMIN — LORAZEPAM 0.5 MG: 0.5 TABLET ORAL at 09:05

## 2023-05-30 RX ADMIN — Medication 100 MG: at 09:05

## 2023-05-30 RX ADMIN — FAMOTIDINE 20 MG: 20 TABLET ORAL at 09:05

## 2023-05-30 RX ADMIN — ACETAMINOPHEN 650 MG: 325 TABLET ORAL at 02:05

## 2023-05-30 NOTE — PT/OT/SLP PROGRESS
"Physical Therapy Treatment    Patient Name:  Gerda Lai   MRN:  [id number]  Admit Date: 5/26/2023  Admitting Diagnosis: Primary osteoarthritis of right knee  Recent Surgeries:     General Precautions: Standard, fall  Orthopedic Precautions: RLE weight bearing as tolerated  Braces: N/A    Recommendations:     Discharge Recommendations: home health PT  Level of Assistance Recommended at Discharge: Intermittent assistance   Discharge Equipment Recommendations: none  Barriers to discharge: Decreased caregiver support, Inaccessible home environment    Assessment:     Gerda Lai is a 89 y.o. female admitted with a medical diagnosis of Primary osteoarthritis of right knee . Pt tolerated well,  pt is very pleasant, demo good effort, highly motivated, pt would continue to benefit from skilled PT services to improve overall functional mobility, strength and endurance.  .      Performance deficits affecting function: weakness, impaired endurance, impaired self care skills, impaired functional mobility, gait instability, impaired balance, decreased lower extremity function, decreased safety awareness, pain, decreased ROM, edema, orthopedic precautions.    Rehab Potential is good    Activity Tolerance: Fair    Plan:     Patient to be seen 5 x/week to address the above listed problems via gait training, therapeutic activities, therapeutic exercises, neuromuscular re-education, wheelchair management/training    Plan of Care Expires: 06/26/23  Plan of Care Reviewed with: patient    Subjective     "What ever ya'll want me to do" pt agreeable to therapy after OT, ed to always let us know if she needs a break between sessions, pt verbalized understanding    Pain/Comfort:  Pain Rating 1: 1/10 (0 with rest, 1 with gait)  Location - Side 1: Right  Location - Orientation 1: generalized  Location 1: knee  Pain Addressed 1: Pre-medicate for activity, Reposition, Distraction, Cessation of Activity  Pain Rating Post-Intervention 1: " 1/10    Patient's cultural, spiritual, Mu-ism conflicts given the current situation:  no    Objective:       Patient found with  (in wc) upon PT entry to room.     Therapeutic Activities and Exercises: 2x10 reps AP,GS,QS,SAQ, hip flex, knee flex with cloth on floor,  mini elliptical x 8 minutes    Functional Mobility:  Transfers:     Sit to Stand:  stand by assistance with rolling walker and vcs for tech  Stand pivot WC to BSC SBA ~ 5 ft  Gait: amb with RW SBA step through gait pattern ~ 180 ft no LOB    AM-PAC 6 CLICK MOBILITY  17    Patient left up in chair with call button in reach and belonging sin reach .    GOALS:   Multidisciplinary Problems       Physical Therapy Goals          Problem: Physical Therapy    Goal Priority Disciplines Outcome Goal Variances Interventions   Physical Therapy Goal     PT, PT/OT Ongoing, Progressing     Description: Goals to be met by: 23     Patient will increase functional independence with mobility by performin. Supine to sit with Modified [place]  2. Sit to supine with Modified [place]  3. Rolling to Left and Right with [place]  4. Sit to stand transfer with Modified [place]  5. Bed to chair transfer with Modified [place] using Rolling Walker  6. Gait  x 150 feet with Supervision using Rolling Walker  7. Wheelchair propulsion x 50 feet with Supervision using bilateral upper extremities  8. Ascend/Descend 6 inch/8 inch curb step with Stand-by Assistance using Rolling Walker  9. Lower extremity exercise program 3 x 10 reps per handout, with assistance as needed                         Time Tracking:     PT Received On: 23  PT Start Time: 1122  PT Stop Time: 1204  PT Total Time (min): 42 min    Billable Minutes: Gait Training 12, Therapeutic Activity 10, and Therapeutic Exercise 20    Treatment Type: Treatment  PT/PTA: PTA     Number of PTA visits since last PT visit: 2     2023

## 2023-05-30 NOTE — PROGRESS NOTES
[facility]                                  Skilled Nursing Facility                   Progress Note     Admit Date: 5/26/2023  LEONARDO TBD  Principal Problem:  Primary osteoarthritis of right knee   HPI obtained from patient interview and chart review     Chief Complaint: Re-evaluation of medical treatment and therapy status    HPI:   Gerda Lai is a 89 year old female PMHx of CKD stage 3, anxiety, arthritis, HLD, HTN, and Right knee pain who presents to SNF following hospitalization for primary osteoarthritis of right knee s/p right total knee arthroplasty on 05/22/2023 with Dr. Cobian.  Admission to SNF for secondary weakness and debility.    Interval history:   24 hr vital sign ranges listed below.  Patient states her pain is well controlled at this time.  Patient denies shortness of breath, abdominal discomfort, nausea, or vomiting.  Patient reports an adequate appetite.  Patient denies dysuria.  Patient reports having regular bowel movements.  Patient progessing with PT/OT- ambulated ~180 ft with  closeSBA and rolling walker. Continuing to follow and treat all acute and chronic conditions.    Past Medical History: Patient has a past medical history of Anxiety, Arthritis, Basal cell carcinoma (09/2016), Basal cell carcinoma of right forearm (04/19/2023), Breast cancer (1992), Cataract, Fibromyalgia, History of measles as a child, Hyperlipidemia, Hypertension, Personal history of colonic polyps, Pneumonia, SCC (squamous cell carcinoma) (2015), SCC (squamous cell carcinoma) (2016), SCC (squamous cell carcinoma) (2017), SCC (squamous cell carcinoma) (2022), Shingles, Squamous cell carcinoma (2015), Thyroid disease, and Vaginitis.    Past Surgical History: Patient has a past surgical history that includes thyriodectomy; Total hip arthroplasty; tonsillectomy; Adenoidectomy; Cataract extraction w/  intraocular lens implant (Bilateral); Yag   (Left); Breast lumpectomy (Right, 1992); Breast biopsy (Left); Eye surgery; Tonsillectomy; Joint replacement (Right); Knee Arthroplasty (Left, 8/10/2020); Carpal tunnel release (Right, 3/16/2021); Hand surgery; Injection of anesthetic agent around medial branch nerves innervating lumbar facet joint (Left, 11/18/2021); Injection of anesthetic agent around medial branch nerves innervating cervical facet joint (N/A, 1/4/2022); Radiofrequency thermal coagulation of medial branch of posterior ramus of cervical spinal nerve (Left, 3/8/2022); Epidural steroid injection (N/A, 2/24/2023); and arthroplasty, knee, total, using computer-assisted navigation (Right, 5/22/2023).    Social History: Patient reports that she has never smoked. She has never been exposed to tobacco smoke. She has never used smokeless tobacco. She reports current alcohol use. She reports that she does not use drugs.    Family History: family history includes Breast cancer in her mother; Cancer in her mother and paternal aunt; Glaucoma in her paternal grandmother; Heart disease in her father and paternal aunt; Stroke in her paternal grandfather.    Allergies: Patient is allergic to sulfa (sulfonamide antibiotics), clarithromycin, flexeril [cyclobenzaprine], iodine and iodide containing products, lisinopril, losartan, metoprolol, spironolactone, tramadol, verapamil (bulk), and voltaren [diclofenac sodium].    ROS  Constitutional: Negative for fever   Eyes: Negative for blurred vision, double vision   Respiratory: Negative for cough, shortness of breath   Cardiovascular: Negative for chest pain, palpitations, and leg swelling.   Gastrointestinal: Negative for abdominal pain, constipation, diarrhea, nausea, vomiting.   Genitourinary: Negative for dysuria, frequency   Musculoskeletal:  + generalized weakness.  +right knee pain  Skin: Negative for itching and rash.   Neurological: Negative for dizziness, headaches.   Psychiatric/Behavioral: Negative for  depression. The patient is not nervous/anxious.      24 hour Vital Sign Range   Temp:  [97.6 °F (36.4 °C)-97.8 °F (36.6 °C)]   Pulse:  [61-66]   Resp:  [16-17]   BP: (141-185)/(58-79)   SpO2:  [98 %-99 %]     Current BMI: Body mass index is 24.37 kg/m².    PEx  Constitutional: Patient appears debilitated.  No distress noted  HENT:   Head: Normocephalic and atraumatic.   Eyes: Pupils are equal, round  Neck: Normal range of motion. Neck supple.   Cardiovascular: Normal rate, regular rhythm and normal heart sounds.    Pulmonary/Chest: Effort normal and breath sounds are clear  Abdominal: Soft. Bowel sounds are normal.   Musculoskeletal: Normal range of motion.   Neurological: Alert and oriented to person, place, and time.   Psychiatric: Normal mood and affect. Behavior is normal.   Skin: Skin is warm and dry.  Surgical dressing to right knee, only to be removed by Ortho.    No results for input(s): GLUCOSE, NA, K, CL, CO2, BUN, CREATININE, MG in the last 24 hours.    Invalid input(s):  CALCIUM    No results for input(s): WBC, RBC, HGB, HCT, PLT, MCV, MCH, MCHC in the last 24 hours.    No results for input(s): POCTGLUCOSE in the last 168 hours.     Assessment and Plan:    Primary osteoarthritis of right knee  S/p right TKA on 05/22  - PT/OT, WBAT  - DVT PPX with ASA 81 mg BID times 30 days  - postop prophylactic antibiotics, cefadroxil 500 mg BID x7 days  - ortho JOE to see pt weekly at [facility]  - do not remove dressing at [facility]- will be done after DC or by Ortho  - follow-up with orthopedics after discharge    Acute postoperative pain  - stable, continue acetaminophen 650 mg q.8 hours, oxycodone 5 or 10 mg q.6 hours PRN, methocarbamol 750 mg TID prn    CKD stage 3  - stable, continue monitor twice weekly BMPs, avoid nephrotoxic agents, renally dose medications when appropriate    HTN  - stable, continue amlodipine 5 mg daily (in place of home non formulary felodipine 5 mg daily), labetalol 100 mg BID, Aldactone 25  mg    HLD  - continue pravastatin 80 mg daily    GERD  - continue famotidine 20 mg daily    Debility   - Continue with PT/OT for gait training and strengthening and restoration of ADL's   - Encourage mobility, OOB in chair, and early ambulation as appropriate  - Fall precautions   - Monitor for bowel and bladder dysfunction  - Monitor for and prevent skin breakdown and pressure ulcers  - Continue DVT prophylaxis with ASA 81 mg BID, frequent ambulation      Anticipate disposition:  Home with home health      Follow-up needed during SNF stay-orthopedics (6/6) unless otherwise specified, derm 6/26 if still at SNF    Follow-up needed after discharge from SNF: PCP, Orthopedics    Future Appointments   Date Time Provider Department Center   6/6/2023  2:00 PM Stacy Harris NP [facility] ORTHO [address]   6/26/2023  3:00 PM Bouchra Curtis MD [facility] DERM [place]   7/6/2023  3:20 PM Milton Cobian III, MD [facility] ORTHO [address]   7/18/2023  1:30 PM Andi Cisneros DO [facility] PAINMA [place]   8/15/2023  3:20 PM Milton Cobian III, MD [facility] ORTHO [address]   9/11/2023  2:45 PM Milton Mandujano MD [facility] DERM [place]   11/2/2023  2:00 PM Tomas Beltran MD [facility] IM Marilyn Arango NP  Department of [facility] Medicine   [facility]     DOS: 5/30/2023       Patient note was created using MModal Dictation.  Any errors in syntax or even information may not have been identified and edited on initial review prior to signing this note.

## 2023-05-30 NOTE — PT/OT/SLP PROGRESS
"Occupational Therapy   Treatment    Name: Gerda Lai  MRN: [id number]  Admit Date: 5/26/2023  Admitting Diagnosis:  Primary osteoarthritis of right knee    General Precautions: Standard, fall   Orthopedic Precautions: RLE weight bearing as tolerated   Braces: N/A    Recommendations:     Discharge Recommendations:  home health OT  Level of Assistance Recommended at Discharge: Intermittent supervision  Discharge Equipment Recommendations: none  Barriers to discharge:  Decreased caregiver support    Assessment:     Gerda Lai is a 89 y.o. female with a medical diagnosis of Primary osteoarthritis of right knee.  She presents with limitations in performance of self-care, functional mobility, and ADLs. Performance deficits affecting function are impaired endurance, weakness, impaired self care skills, gait instability, impaired balance, decreased coordination, decreased upper extremity function, decreased lower extremity function, decreased safety awareness, decreased ROM, impaired coordination, impaired skin, edema, orthopedic precautions. Pt tolerated Tx without incident and is making progress but continues to require assist to perform self care tasks, functional mobility and functional transfers. Pt would continue to benefit from OT intervention to further functional (I)ce and safety.     Rehab Potential is good    Activity tolerance:  Good    Plan:     Patient to be seen 5 x/week to address the above listed problems via self-care/home management, therapeutic activities, therapeutic exercises    Plan of Care Expires: 06/26/23  Plan of Care Reviewed with: patient    Subjective     "I've been waiting for someone to get me out of bed. I can't get dressed by myself and I need to use the bathroom. I'm trying to be patient."    Communicated with: Nursing prior to session.     Pain/Comfort:  Pain Rating 1: 2/10  Location - Side 1: Right  Location - Orientation 1: generalized  Location 1: knee  Pain Addressed 1: " Reposition, Distraction  Pain Rating Post-Intervention 1: 0/10    Patient's cultural, spiritual, Congregational conflicts given the current situation:  no    Objective:     Patient found HOB elevated with  (no active lines) upon OT entry to room.    Bed Mobility:    Patient completed Scooting/Bridging with supervision  Patient completed Supine to Sit with supervision     Functional Mobility/Transfers:  Patient completed Sit <> Stand Transfer with supervision  with  rolling walker   Patient completed Toilet Transfer Step Transfer technique with supervision with  rolling walker  Functional Mobility: Pt ambulated around room/bathroom with sup and RW. Pt propelled W/C from room towards therapy gym with sup tolerating a total of 85 ft using BUE to propel chair.    Activities of Daily Living:  Grooming: supervision standing sinkside with RW with set up   Bathing: minimum assistance seated sponge bath with (A) to was back with pt performing pericare standing with RW  Upper Body Dressing: supervision to doff/anya pull over shirt   Lower Body Dressing: supervision to anya pants and slip on shoes  Toileting: supervision      AMPA 6 Click ADL: 19    OT Exercises: Pt performed UBE exercise for 10 minutes with Min resistance.  UE exercises performed to increase functional endurance and strength in order increase independence when performing self care tasks, functional ambulation, W/C propulsion, and functional standing activities .    Treatment & Education:  Pt educated on:  - role of OT  - level of assistance  - energy conservation and task modification to maximized independence with ADL's and mobility   -  safety while performing functional transfers and self care tasks  - progress towards OT goals    Patient left up in chair with  PTA present    GOALS:   Multidisciplinary Problems       Occupational Therapy Goals          Problem: Occupational Therapy    Goal Priority Disciplines Outcome Interventions   Occupational Therapy Goal      OT, PT/OT Ongoing, Progressing    Description: Goals to be met by: 06/27/23     Patient will increase functional independence with ADLs by performing:    UE Dressing with Modified [place].  LE Dressing with Modified [place].  Grooming while standing at sink with Modified [place].  Toileting from toilet with Modified [place] for hygiene and clothing management.   Bathing from  sitting at sink with Modified [place].  Toilet transfer to toilet with Modified [place].  Upper extremity exercise program 3x15 reps per handout, with independence.                         Time Tracking:     OT Date of Treatment: 05/30/23  OT Start Time: 1038    OT Stop Time: 1121  OT Total Time (min): 43 min    Billable Minutes:Self Care/Home Management 30  Therapeutic Exercise 13    5/30/2023

## 2023-05-31 PROCEDURE — 97535 SELF CARE MNGMENT TRAINING: CPT | Mod: CO

## 2023-05-31 PROCEDURE — 11000004 HC SNF PRIVATE

## 2023-05-31 PROCEDURE — 97110 THERAPEUTIC EXERCISES: CPT | Mod: CO

## 2023-05-31 PROCEDURE — 97530 THERAPEUTIC ACTIVITIES: CPT | Mod: CO

## 2023-05-31 PROCEDURE — 25000242 PHARM REV CODE 250 ALT 637 W/ HCPCS: Performed by: NURSE PRACTITIONER

## 2023-05-31 PROCEDURE — 97110 THERAPEUTIC EXERCISES: CPT | Mod: CQ

## 2023-05-31 PROCEDURE — 99306 PR NURSING FACILITY CARE, INIT, HIGH SEVERITY: ICD-10-PCS | Mod: AI,,, | Performed by: HOSPITALIST

## 2023-05-31 PROCEDURE — 25000003 PHARM REV CODE 250: Performed by: HOSPITALIST

## 2023-05-31 PROCEDURE — 97530 THERAPEUTIC ACTIVITIES: CPT | Mod: CQ

## 2023-05-31 PROCEDURE — 99306 1ST NF CARE HIGH MDM 50: CPT | Mod: AI,,, | Performed by: HOSPITALIST

## 2023-05-31 PROCEDURE — 97116 GAIT TRAINING THERAPY: CPT | Mod: CQ

## 2023-05-31 RX ADMIN — FAMOTIDINE 20 MG: 20 TABLET ORAL at 10:05

## 2023-05-31 RX ADMIN — AMLODIPINE BESYLATE 5 MG: 5 TABLET ORAL at 10:05

## 2023-05-31 RX ADMIN — CEFADROXIL 500 MG: 500 CAPSULE ORAL at 10:05

## 2023-05-31 RX ADMIN — CHOLECALCIFEROL TAB 25 MCG (1000 UNIT) 1000 UNITS: 25 TAB at 10:05

## 2023-05-31 RX ADMIN — LABETALOL HYDROCHLORIDE 100 MG: 100 TABLET, FILM COATED ORAL at 10:05

## 2023-05-31 RX ADMIN — SPIRONOLACTONE 25 MG: 25 TABLET, FILM COATED ORAL at 04:05

## 2023-05-31 RX ADMIN — ASPIRIN 81 MG: 81 TABLET, COATED ORAL at 10:05

## 2023-05-31 RX ADMIN — ACETAMINOPHEN 650 MG: 325 TABLET ORAL at 10:05

## 2023-05-31 RX ADMIN — ACETAMINOPHEN 650 MG: 325 TABLET ORAL at 02:05

## 2023-05-31 RX ADMIN — Medication 100 MG: at 10:05

## 2023-05-31 RX ADMIN — PRAVASTATIN SODIUM 80 MG: 20 TABLET ORAL at 10:05

## 2023-05-31 RX ADMIN — ALISKIREN HEMIFUMARATE 300 MG: 150 TABLET, FILM COATED ORAL at 10:05

## 2023-05-31 RX ADMIN — PSYLLIUM HUSK 1 PACKET: 3.4 POWDER ORAL at 10:05

## 2023-05-31 RX ADMIN — ACETAMINOPHEN 650 MG: 325 TABLET ORAL at 05:05

## 2023-05-31 RX ADMIN — THERA TABS 1 TABLET: TAB at 10:05

## 2023-05-31 NOTE — PT/OT/SLP PROGRESS
Occupational Therapy   Treatment    Name: Gerda Lai  MRN: [id number]  Admit Date: 5/26/2023  Admitting Diagnosis:  Primary osteoarthritis of right knee    General Precautions: Standard, fall   Orthopedic Precautions: RLE weight bearing as tolerated   Braces: N/A    Recommendations:     Discharge Recommendations:  home health OT  Level of Assistance Recommended at Discharge: Intermittent supervision  Discharge Equipment Recommendations: none  Barriers to discharge:  Decreased caregiver support    Assessment:     Gerda Lai is a 89 y.o. female with a medical diagnosis of Primary osteoarthritis of right knee.  She presents with limitations in performance of self-care, functional mobility, and ADLs. Performance deficits affecting function are impaired endurance, weakness, impaired self care skills, gait instability, impaired balance, decreased coordination, decreased upper extremity function, decreased lower extremity function, decreased safety awareness, decreased ROM, impaired coordination, impaired skin, edema, orthopedic precautions. Pt tolerated Tx without incident and is making progress but continues to require assist to perform self care tasks, functional mobility and functional transfers. Pt would continue to benefit from OT intervention to further functional (I)ce and safety.     Rehab Potential is good    Activity tolerance:  Good    Plan:     Patient to be seen 5 x/week to address the above listed problems via self-care/home management, therapeutic activities, therapeutic exercises    Plan of Care Expires: 06/26/23  Plan of Care Reviewed with: patient    Subjective     Communicated with: Nursing prior to session.     Pain/Comfort:  Pain Rating 1: 1/10  Location - Side 1: Right  Location - Orientation 1: generalized  Location 1: knee  Pain Addressed 1: Distraction, Reposition  Pain Rating Post-Intervention 1: 0/10    Patient's cultural, spiritual, Caodaism conflicts given the current  situation:  no    Objective:     Patient found up in chair with  (no lines) upon OT entry to room.    Bed Mobility:    Pt seated in chair at onset of treatment session.     Functional Mobility/Transfers:  Patient completed Sit <> Stand Transfer with supervision  with  rolling walker   Functional Mobility: Pt ambulated around room with sup and RW    Activities of Daily Living:  Upper Body Dressing: minimum assistance to clasp bra   Lower Body Dressing: supervision to anya underwear and pants with RW to manage over hips    AMPAC 6 Click ADL: 19    OT Exercises:Wall Pulleys performed to increase functional ROM for reaching to increase independence with self care tasks.   Pt performed Bilateral UE exercise with 2 lb dowel through functional ROM with repetition of 10 x3.  UE exercises performed to increase functional endurance and strength in order increase independence when performing self care tasks, functional ambulation, W/C propulsion, and functional standing activities .    Treatment & Education:  Pt participated in standing activity with sup and RW. Pt at raised counter to perform fine motor and visual scanning activity with focus on standing tolerance, functional reaching, dynamic standing bal, crossing midline, and to promote independence with homemaking and self care tasks. Pt tolerated standing for 7 min and 41 sec     Pt educated on:  - role of OT  - level of assistance  - energy conservation and task modification to maximized independence with ADL's and mobility   -  safety while performing functional transfers and self care tasks  - progress towards OT goals    Patient left up in chair with  pta present    GOALS:   Multidisciplinary Problems       Occupational Therapy Goals          Problem: Occupational Therapy    Goal Priority Disciplines Outcome Interventions   Occupational Therapy Goal     OT, PT/OT Ongoing, Progressing    Description: Goals to be met by: 06/27/23     Patient will increase functional  independence with ADLs by performing:    UE Dressing with Modified [place].  LE Dressing with Modified [place].  Grooming while standing at sink with Modified [place].  Toileting from toilet with Modified [place] for hygiene and clothing management.   Bathing from  sitting at sink with Modified [place].  Toilet transfer to toilet with Modified [place].  Upper extremity exercise program 3x15 reps per handout, with independence.                         Time Tracking:     OT Date of Treatment: 05/31/23  OT Start Time: 0907    OT Stop Time: 0946  OT Total Time (min): 39 min    Billable Minutes:Self Care/Home Management 11  Therapeutic Activity 13  Therapeutic Exercise 15    5/31/2023

## 2023-05-31 NOTE — PT/OT/SLP PROGRESS
"Physical Therapy Treatment    Patient Name:  Gerda Lai   MRN:  [id number]  Admit Date: 5/26/2023  Admitting Diagnosis: Primary osteoarthritis of right knee  Recent Surgeries: ARTHROPLASTY, KNEE, TOTAL, USING COMPUTER-ASSISTED NAVIGATION: QUYEN: RIGHT: MARTHA - TRIATHALON (Right)    General Precautions: Standard, fall  Orthopedic Precautions: RLE weight bearing as tolerated  Braces: N/A    Recommendations:     Discharge Recommendations: home health PT  Level of Assistance Recommended at Discharge: Intermittent assistance   Discharge Equipment Recommendations: none  Barriers to discharge: Decreased caregiver support, Inaccessible home environment    Assessment:     Gerda Lai is a 89 y.o. female admitted with a medical diagnosis of Primary osteoarthritis of right knee .   Pt was agreeable and tolerated therapy session well. Pt completed gait training, curb step, and therex with no issues. Pt completed 6" curb step with CGA and RW. Pt still requires occasional cues for better form and posture. Pt continues to benefit from therapy to achieve highest level of independence prior to discharge.       Performance deficits affecting function: weakness, impaired endurance, impaired self care skills, impaired functional mobility, gait instability, impaired balance, decreased lower extremity function, decreased safety awareness, pain, decreased ROM, edema, orthopedic precautions.    Rehab Potential is good    Activity Tolerance: Good    Plan:     Patient to be seen 5 x/week to address the above listed problems via gait training, therapeutic activities, therapeutic exercises, neuromuscular re-education, wheelchair management/training    Plan of Care Expires: 06/26/23  Plan of Care Reviewed with: patient    Subjective     Pt reports RLE feeling ahy, not much pain.     Pain/Comfort:  Pain Rating 1: 1/10  Location - Side 1: Right  Location - Orientation 1: generalized  Location 1: knee  Pain Addressed 1: Reposition  Pain " "Rating Post-Intervention 1: 0/10    Patient's cultural, spiritual, Confucianism conflicts given the current situation:  no    Objective:     Patient found  up in wheelchair in therapy gym  with  (no lines) upon PT entry.     Therapeutic Activities and Exercises:   Seated BLE therex x10 reps: ankle DF/PF, LAQ, marching, heel slide (AA on RLE, YTB on LLE), hip abd with YTB  Mini elliptical x10 mins (light resistance)  To improve BLE endurance, strength, and ROM  Cues for pacing and pillow behind back  Pt tolerated going forward for 10 mins.   Patient educated on role of therapy, goals of session, and benefits of out of bed mobility.   Instructed on use of call button and importance of calling nursing staff for assistance with mobility   Questions/concerns addressed within PTA scope of practice  Pt verbalized understanding.    Functional Mobility:  Transfers:   Sit <> Stand Transfer: stand by assistance and contact guard assistance with rolling walker   Gait:  Pt ambulated ~175 ft with stand by assistance and rolling walker.  Occasional standing rest breaks & no overt LOB  Gait Deviation(s):  mostly steady with slight decreases rupinder, slight left lean, and downward gaze   Verbal/tactile cues for gaze direction and upright posture  Curb:  Pt ascended/descended 6" curb step with Rolling Walker with Contact Guard Assistance.     AM-PAC 6 CLICK MOBILITY  17    Patient left  up in wheelchair in activity room  with  nurse notified and activity coordiator present.    GOALS:   Multidisciplinary Problems       Physical Therapy Goals          Problem: Physical Therapy    Goal Priority Disciplines Outcome Goal Variances Interventions   Physical Therapy Goal     PT, PT/OT Ongoing, Progressing     Description: Goals to be met by: 23     Patient will increase functional independence with mobility by performin. Supine to sit with Modified [place]  2. Sit to supine with Modified [place]  3. Rolling to Left and " Right with [place]  4. Sit to stand transfer with Modified [place]  5. Bed to chair transfer with Modified [place] using Rolling Walker  6. Gait  x 150 feet with Supervision using Rolling Walker  7. Wheelchair propulsion x 50 feet with Supervision using bilateral upper extremities  8. Ascend/Descend 6 inch/8 inch curb step with Stand-by Assistance using Rolling Walker  9. Lower extremity exercise program 3 x 10 reps per handout, with assistance as needed                         Time Tracking:     PT Received On: 05/31/23  PT Start Time: 0948  PT Stop Time: 1027  PT Total Time (min): 39 min    Billable Minutes: Gait Training 14, Therapeutic Activity 10, and Therapeutic Exercise 15    Treatment Type: Treatment  PT/PTA: PTA     Number of PTA visits since last PT visit: 3     05/31/2023

## 2023-05-31 NOTE — H&P
"Hospital Medicine  Skilled Nursing Facility   History and Physical Exam      Date of Service: 05/31/2023      Patient Name: Gerda Lai  MRN: [id number]  Admission Date: 5/26/2023  Attending Physician: London Sam MD  Primary Care Provider: Tomas Beltran MD  Code Status: Full Code      Principal problem:  Primary osteoarthritis of right knee      Chief Complaint:   Chief Complaint   Patient presents with    Knee Pain     Admitted to OS for rehabilitation following hospital stay for right total knee arthroplasty.           HPI:   "Gerda Lai is a 89 year old female PMHx of CKD stage 3, anxiety, arthritis, HLD, HTN, and Right knee pain who presents to SNF following hospitalization for primary osteoarthritis of right knee s/p right total knee arthroplasty on 05/22/2023 with Dr. Cobian.  Admission to SNF for secondary weakness and debility.     Patient was originally evaluated in orthopedic clinic.  Per notes,   Pain is worse with activity and weight bearing.  Patient has experienced interference of activities of daily living due to decreased range of motion and an increase in joint pain and swelling.  Patient has failed non-operative treatment including NSAIDs, corticosteroid injections, viscosupplement injections, and activity modification.  Gerda Lai currently ambulates using assistive device." Patient taken to the OR on 05/22 for right total knee arthroplasty.  No intraop complications noted,  mL, skin closed with sutures.  Surgical sealant was also placed over the top of the incision and sterile dressing is applied.  Dressing only to be removed by orthopedic surgeon.  Patient will be followed by PT/OT for continued strengthening.     Today, patient states her pain is well controlled.  She has no other complaints at this time.  Social bowel movement today.  Patient states that she lives alone but has help from her nieces and nephews." Per Leigh Arango NP on 5/29/23.     She is doing well. " Says that her pain is controlled. She is working more with therapy and feels that she is getting stronger. She reports a good appetite and denies any nausea or abdominal pain.     Patient admitted with skilled services with PT and OT to improve functional status and ability to perform ADLs.       Past Medical History:   Past Medical History:   Diagnosis Date    Anxiety     Arthritis     Basal cell carcinoma 09/2016    right post auricular neck     Basal cell carcinoma of right forearm 04/19/2023    R lower forearm    Breast cancer 1992    right    Cataract     Fibromyalgia     History of measles as a child     Hyperlipidemia     Hypertension     Personal history of colonic polyps     Pneumonia     SCC (squamous cell carcinoma) 2015    R chest    SCC (squamous cell carcinoma) 2016    left medial shoulder    SCC (squamous cell carcinoma) 2017    left knee    SCC (squamous cell carcinoma) 2022    L upper chest, R upper arm KA types    Shingles     Squamous cell carcinoma 2015    right forearm    Thyroid disease     Vaginitis        Past Surgical History:   Past Surgical History:   Procedure Laterality Date    ADENOIDECTOMY      ARTHROPLASTY, KNEE, TOTAL, USING COMPUTER-ASSISTED NAVIGATION Right 5/22/2023    Procedure: ARTHROPLASTY, KNEE, TOTAL, USING COMPUTER-ASSISTED NAVIGATION: QUYEN: RIGHT: MARTHA - IDANIA;  Surgeon: Milton Cobian III, MD;  Location: 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 OR;  Service: Orthopedics;  Laterality: Right;    CATARACT EXTRACTION W/  INTRAOCULAR LENS IMPLANT Bilateral     EPIDURAL STEROID INJECTION N/A 2/24/2023    Procedure: HEIDI C7-T1;  Surgeon: Andi Cisneros DO;  Location: [facility] OR;  Service: Pain Management;  Laterality: N/A;    EYE SURGERY       HAND SURGERY      INJECTION OF ANESTHETIC AGENT AROUND MEDIAL BRANCH NERVES INNERVATING CERVICAL FACET JOINT N/A 1/4/2022    Procedure: Cervical Medial Branch Block C5-6, C6-7 (No Sedation);  Surgeon: Himanshu Blackmon Jr., MD;  Location: [facility] PAIN MGT;  Service: Pain Management;  Laterality: N/A;    INJECTION OF ANESTHETIC AGENT AROUND MEDIAL BRANCH NERVES INNERVATING LUMBAR FACET JOINT Left 11/18/2021    Procedure: Cervical Medial Branch Block Left C5-6, C6-7 (IV Sedation);  Surgeon: Himanshu Blackmon Jr., MD;  Location: [facility] PAIN [facility]GT;  Service: Pain Management;  Laterality: Left;    JOINT REPLACEMENT Right     ELZBIETA    KNEE ARTHROPLASTY Left 8/10/2020    Procedure: ARTHROPLASTY, KNEE-ADE NEXGEN/CEMENTED TIBIA;  Surgeon: Kalin Pacheco MD;  Location: [facility] OR;  Service: Orthopedics;  Laterality: Left;    RADIOFREQUENCY THERMAL COAGULATION OF MEDIAL BRANCH OF POSTERIOR RAMUS OF CERVICAL SPINAL NERVE Left 3/8/2022    Procedure: RADIOFREQUENCY THERMAL COAGULATION, NERVE, SPINAL, CERVICAL, LEFT C5-6 AND C6-7;  Surgeon: Himanshu Blackmon Jr., MD;  Location: [facility] PAIN [facility]GT;  Service: Pain Management;  Laterality: Left;  NO PACEMAKER   ASA    thyriodectomy      partial    tonsillectomy      TONSILLECTOMY      TOTAL HIP ARTHROPLASTY      right    Yag  Left        Social History:   Tobacco Use    Smoking status: Never     Passive exposure: Never    Smokeless tobacco: Never   Substance and Sexual Activity    Alcohol use: Yes     Comment: socially - celebrations only    Drug use: Never    Sexual activity: Not Currently       Family History:   Family History       Problem Relation (Age of Onset)    Breast cancer Mother    Cancer Mother, Paternal Aunt    Glaucoma Paternal Grandmother    Heart disease Father, Paternal Aunt    Stroke Paternal Grandfather            No current facility-administered medications on file prior to encounter.     Current Outpatient Medications on File Prior to Encounter   Medication Sig     acetaminophen (TYLENOL) 650 MG TbSR Take 1 tablet (650 mg total) by mouth every 8 (eight) hours.    albuterol (PROVENTIL/VENTOLIN HFA) 90 mcg/actuation inhaler INHALE 1 TO 2 PUFFS BY MOUTH EVERY 6 HOURS AS NEEDED FOR WHEEZE    aliskiren (TEKTURNA) 300 MG Tab TAKE 1 TABLET BY MOUTH EVERY DAY    aspirin (ECOTRIN) 81 MG EC tablet Take 1 tablet (81 mg total) by mouth 2 (two) times daily.    clotrimazole-betamethasone 1-0.05% (LOTRISONE) cream Apply topically once daily. To affected toenail.    coenzyme Q10 100 mg capsule Take 100 mg by mouth once daily.    docusate sodium (COLACE) 100 MG capsule Take 1 capsule (100 mg total) by mouth 2 (two) times daily.    felodipine (PLENDIL) 5 MG 24 hr tablet Take 1 tablet (5 mg total) by mouth once daily.    glucosamine-chondroitin 500-400 mg tablet Take 1 tablet by mouth once daily.     labetaloL (NORMODYNE) 100 MG tablet Take 100 mg by mouth 2 (two) times daily.    lactase (LACTAID) 3,000 unit tablet Take 1 tablet by mouth 3 (three) times daily as needed.    LORazepam (ATIVAN) 0.5 MG tablet TAKE 1/2 TABLET TO 1 TABLET BY MOUTH EVERY 12 HOURS AS NEEDED FOR ANXIETY    methocarbamoL (ROBAXIN) 750 MG Tab Take 1 tablet (750 mg total) by mouth 3 (three) times daily as needed.    multivitamin capsule Take 1 capsule by mouth once daily.    mupirocin (BACTROBAN) 2 % ointment AAA bid    mupirocin (BACTROBAN) 2 % ointment Apply topically 3 (three) times daily. Apply inside each nostril twice a day for 7 days.    omeprazole (PRILOSEC OTC) 20 MG tablet Take 20 mg by mouth once daily.    oxyCODONE (ROXICODONE) 5 MG immediate release tablet May take 1-2 tablets every 6 hours as needed for pain    polymyxin B sulf-trimethoprim (POLYTRIM) 10,000 unit- 1 mg/mL Drop Place 1 drop into both eyes every 6 (six) hours.    pravastatin (PRAVACHOL) 80 MG tablet Take 1 tablet (80 mg total) by mouth once daily.    spironolactone (ALDACTONE) 25 MG tablet Take 1 tablet (25 mg total) by mouth 3 (three) times a  week.    TURMERIC ORAL Take 500 mg by mouth once daily.     vitamin D (VITAMIN D3) 1000 units Tab Take 1,000 Units by mouth once daily.       Allergies:   Review of patient's allergies indicates:   Allergen Reactions    Sulfa (sulfonamide antibiotics) Rash    Clarithromycin Other (See Comments)     Weak, extreme fatigue. dizziness    Flexeril [cyclobenzaprine] Other (See Comments)     Dizziness    Iodine and iodide containing products     Lisinopril Other (See Comments)     Cough and sensation of throat swelling/?angioedema    Losartan Rash    Metoprolol Swelling     Tightness in throat    Spironolactone      Throat tightening    Tramadol Other (See Comments)     Dizzy and weak    Verapamil (bulk) Palpitations    Voltaren [diclofenac sodium] Other (See Comments)     Drops blood pressure       ROS:  Review of Systems   Constitutional:  Negative for appetite change and fever.   Respiratory:  Negative for cough and shortness of breath.    Cardiovascular:  Negative for chest pain and palpitations.   Gastrointestinal:  Negative for abdominal pain, nausea and vomiting.   Genitourinary:  Negative for difficulty urinating and dysuria.   Musculoskeletal:  Positive for arthralgias and joint swelling. Negative for back pain.   Neurological:  Positive for weakness. Negative for dizziness and light-headedness.   Psychiatric/Behavioral:  Negative for sleep disturbance. The patient is not nervous/anxious.        Objective:  Temp:  [97.8 °F (36.6 °C)-98.8 °F (37.1 °C)]   Pulse:  [61-70]   Resp:  [16-17]   BP: (140-160)/(60-61)   SpO2:  [99 %-100 %]     Body mass index is 24.37 kg/m².      Physical Exam  Vitals and nursing note reviewed.   Constitutional:       General: She is not in acute distress.     Appearance: She is well-developed.   Cardiovascular:      Rate and Rhythm: Normal rate and regular rhythm.      Heart sounds: S1 normal and S2 normal. No murmur heard.  Pulmonary:      Effort: Pulmonary effort is normal. No  respiratory distress.      Breath sounds: Normal breath sounds. No wheezing or rales.   Abdominal:      General: Bowel sounds are normal. There is no distension.      Palpations: Abdomen is soft.      Tenderness: There is no abdominal tenderness.   Musculoskeletal:      Right knee: Swelling present. Decreased range of motion. Tenderness present.      Right lower leg: No edema.      Left lower leg: No edema.      Comments: Bandage in place.    Skin:     General: Skin is warm and dry.      Findings: Bruising present.   Neurological:      Mental Status: She is alert and oriented to person, place, and time.   Psychiatric:         Mood and Affect: Mood normal.         Behavior: Behavior normal. Behavior is cooperative.         Thought Content: Thought content normal.       Significant Labs:   CBC:  Recent Labs   Lab 05/29/23  0829   WBC 7.58   HGB 9.0*   HCT 28.2*      *     BMP:  Recent Labs   Lab 05/29/23  0829         K 4.0      CO2 25   BUN 27*   CREATININE 0.9   CALCIUM 9.6   MG 1.9   PHOS 4.4       Significant Imaging: I have reviewed all pertinent imaging results/findings completed during prior hospitalization.      Assessment and Plan:  Active Diagnoses:    Diagnosis Date Noted POA    PRINCIPAL PROBLEM:  Primary osteoarthritis of right knee [M17.11] 01/05/2016 Yes    Status post total right knee replacement [Z96.651] 05/27/2023 Not Applicable    Macrocytic anemia [D53.9] 08/06/2020 Yes    Carotid arterial disease [I77.9] 07/17/2020 Yes     Chronic    Anxiety [F41.9] 07/06/2020 Yes     Chronic    CKD (chronic kidney disease), stage III [N18.30] 06/14/2015 Yes     Chronic    HLD (hyperlipidemia) [E78.5] 06/14/2015 Yes     Chronic    Hypertension, essential [I10] 07/17/2012 Yes     Chronic      Problems Resolved During this Admission:       Primary osteoarthritis of right knee  S/p right TKA on 05/22  - PT/OT, WBAT  - DVT PPX with ASA 81 mg BID times 30 days  - postop prophylactic  antibiotics, cefadroxil 500 mg BID x7 days (End 6/2)  - ortho JOE to see pt weekly at SNF  - do not remove dressing at SNF- will be done after DC or by Ortho  - follow-up with orthopedics after discharge  - continue acetaminophen 650 mg q.8 hours, oxycodone 5 or 10 mg q.6 hours PRN, methocarbamol 750 mg TID prn     CKD stage 3  - continue monitor twice weekly BMPs  - avoid nephrotoxic agents  - renally dose medications when appropriate     HTN  - continue amlodipine 5 mg daily (in place of home non formulary felodipine 5 mg daily), labetalol 100 mg BID, and spironolactone 25 mg daily.      HLD  - continue pravastatin 80 mg daily     GERD  - continue famotidine 20 mg daily     Debility   - Continue with PT/OT for gait training and strengthening and restoration of ADL's   - Encourage mobility, OOB in chair, and early ambulation as appropriate  - Fall precautions   - Monitor for bowel and bladder dysfunction  - Monitor for and prevent skin breakdown and pressure ulcers  - Continue DVT prophylaxis with ASA 81 mg BID, frequent ambulation      Anticipated Disposition:  Home with home health      Future Appointments   Date Time Provider Department Center   6/6/2023  2:00 PM Stacy Harris NP [facility] ORTHO [address]   6/26/2023  3:00 PM Bouchra Curtis MD [facility] DERM [place]   7/6/2023  3:20 PM Milton Cobian III, MD [facility] ORTHO [address] [place]   7/18/2023  1:30 PM Andi Cisneros DO [facility] PAINMA [place]   8/15/2023  3:20 PM Milton Cobian III, MD [facility] ORTHO [place]   9/11/2023  2:45 PM Milton Mandujano MD [facility] DERM [place]   11/2/2023  2:00 PM Tomas Beltran MD [facility] IM [place]       I certify that SNF services are required to be given on an inpatient basis because Gerda Lai needs for skilled nursing care and/or skilled rehabilitation are required on a daily basis and such services can only practically be provided in a skilled nursing facility setting and are for an ongoing condition for which she  received inpatient care in the hospital.       London Sam MD  Department of Hospital Medicine   [facility] - Skilled Nursing

## 2023-06-01 LAB
ANION GAP SERPL CALC-SCNC: 7 MMOL/L (ref 8–16)
BASOPHILS # BLD AUTO: 0.03 K/UL (ref 0–0.2)
BASOPHILS NFR BLD: 0.4 % (ref 0–1.9)
BUN SERPL-MCNC: 29 MG/DL (ref 8–23)
CALCIUM SERPL-MCNC: 8.8 MG/DL (ref 8.7–10.5)
CHLORIDE SERPL-SCNC: 109 MMOL/L (ref 95–110)
CO2 SERPL-SCNC: 24 MMOL/L (ref 23–29)
CREAT SERPL-MCNC: 0.9 MG/DL (ref 0.5–1.4)
DIFFERENTIAL METHOD: ABNORMAL
EOSINOPHIL # BLD AUTO: 0.3 K/UL (ref 0–0.5)
EOSINOPHIL NFR BLD: 3.8 % (ref 0–8)
ERYTHROCYTE [DISTWIDTH] IN BLOOD BY AUTOMATED COUNT: 13.2 % (ref 11.5–14.5)
EST. GFR  (NO RACE VARIABLE): >60 ML/MIN/1.73 M^2
GLUCOSE SERPL-MCNC: 90 MG/DL (ref 70–110)
HCT VFR BLD AUTO: 25.4 % (ref 37–48.5)
HGB BLD-MCNC: 8.1 G/DL (ref 12–16)
IMM GRANULOCYTES # BLD AUTO: 0.03 K/UL (ref 0–0.04)
IMM GRANULOCYTES NFR BLD AUTO: 0.4 % (ref 0–0.5)
LYMPHOCYTES # BLD AUTO: 2.2 K/UL (ref 1–4.8)
LYMPHOCYTES NFR BLD: 30.1 % (ref 18–48)
MAGNESIUM SERPL-MCNC: 1.9 MG/DL (ref 1.6–2.6)
MCH RBC QN AUTO: 32.4 PG (ref 27–31)
MCHC RBC AUTO-ENTMCNC: 31.9 G/DL (ref 32–36)
MCV RBC AUTO: 102 FL (ref 82–98)
MONOCYTES # BLD AUTO: 1.1 K/UL (ref 0.3–1)
MONOCYTES NFR BLD: 14.8 % (ref 4–15)
NEUTROPHILS # BLD AUTO: 3.8 K/UL (ref 1.8–7.7)
NEUTROPHILS NFR BLD: 50.5 % (ref 38–73)
NRBC BLD-RTO: 0 /100 WBC
PHOSPHATE SERPL-MCNC: 4.3 MG/DL (ref 2.7–4.5)
PLATELET # BLD AUTO: 221 K/UL (ref 150–450)
PMV BLD AUTO: 8.9 FL (ref 9.2–12.9)
POTASSIUM SERPL-SCNC: 4.2 MMOL/L (ref 3.5–5.1)
RBC # BLD AUTO: 2.5 M/UL (ref 4–5.4)
SODIUM SERPL-SCNC: 140 MMOL/L (ref 136–145)
WBC # BLD AUTO: 7.44 K/UL (ref 3.9–12.7)

## 2023-06-01 PROCEDURE — 11000004 HC SNF PRIVATE

## 2023-06-01 PROCEDURE — 97530 THERAPEUTIC ACTIVITIES: CPT | Mod: CO

## 2023-06-01 PROCEDURE — 97110 THERAPEUTIC EXERCISES: CPT | Mod: CO

## 2023-06-01 PROCEDURE — 85025 COMPLETE CBC W/AUTO DIFF WBC: CPT | Performed by: HOSPITALIST

## 2023-06-01 PROCEDURE — 97530 THERAPEUTIC ACTIVITIES: CPT

## 2023-06-01 PROCEDURE — 83735 ASSAY OF MAGNESIUM: CPT | Performed by: HOSPITALIST

## 2023-06-01 PROCEDURE — 25000003 PHARM REV CODE 250: Performed by: NURSE PRACTITIONER

## 2023-06-01 PROCEDURE — 80048 BASIC METABOLIC PNL TOTAL CA: CPT | Performed by: HOSPITALIST

## 2023-06-01 PROCEDURE — 84100 ASSAY OF PHOSPHORUS: CPT | Performed by: HOSPITALIST

## 2023-06-01 PROCEDURE — 97110 THERAPEUTIC EXERCISES: CPT

## 2023-06-01 PROCEDURE — 36415 COLL VENOUS BLD VENIPUNCTURE: CPT | Performed by: HOSPITALIST

## 2023-06-01 PROCEDURE — 97116 GAIT TRAINING THERAPY: CPT

## 2023-06-01 PROCEDURE — 25000003 PHARM REV CODE 250: Performed by: HOSPITALIST

## 2023-06-01 PROCEDURE — 25000242 PHARM REV CODE 250 ALT 637 W/ HCPCS: Performed by: NURSE PRACTITIONER

## 2023-06-01 PROCEDURE — 97535 SELF CARE MNGMENT TRAINING: CPT | Mod: CO

## 2023-06-01 RX ORDER — LORAZEPAM 0.5 MG/1
0.5 TABLET ORAL NIGHTLY
Status: DISCONTINUED | OUTPATIENT
Start: 2023-06-01 | End: 2023-06-06 | Stop reason: HOSPADM

## 2023-06-01 RX ADMIN — AMLODIPINE BESYLATE 5 MG: 5 TABLET ORAL at 09:06

## 2023-06-01 RX ADMIN — THERA TABS 1 TABLET: TAB at 09:06

## 2023-06-01 RX ADMIN — FAMOTIDINE 20 MG: 20 TABLET ORAL at 09:06

## 2023-06-01 RX ADMIN — ACETAMINOPHEN 650 MG: 325 TABLET ORAL at 02:06

## 2023-06-01 RX ADMIN — LABETALOL HYDROCHLORIDE 100 MG: 100 TABLET, FILM COATED ORAL at 09:06

## 2023-06-01 RX ADMIN — CHOLECALCIFEROL TAB 25 MCG (1000 UNIT) 1000 UNITS: 25 TAB at 09:06

## 2023-06-01 RX ADMIN — Medication 100 MG: at 09:06

## 2023-06-01 RX ADMIN — ALISKIREN HEMIFUMARATE 300 MG: 150 TABLET, FILM COATED ORAL at 09:06

## 2023-06-01 RX ADMIN — PRAVASTATIN SODIUM 80 MG: 20 TABLET ORAL at 09:06

## 2023-06-01 RX ADMIN — LORAZEPAM 0.5 MG: 0.5 TABLET ORAL at 09:06

## 2023-06-01 RX ADMIN — CEFADROXIL 500 MG: 500 CAPSULE ORAL at 09:06

## 2023-06-01 RX ADMIN — ASPIRIN 81 MG: 81 TABLET, COATED ORAL at 09:06

## 2023-06-01 RX ADMIN — PSYLLIUM HUSK 1 PACKET: 3.4 POWDER ORAL at 09:06

## 2023-06-01 RX ADMIN — ACETAMINOPHEN 650 MG: 325 TABLET ORAL at 05:06

## 2023-06-01 RX ADMIN — ACETAMINOPHEN 650 MG: 325 TABLET ORAL at 09:06

## 2023-06-01 NOTE — PT/OT/SLP PROGRESS
"Physical Therapy Treatment    Patient Name:  Gerda Lai   MRN:  [id number]  Admit Date: 5/26/2023  Admitting Diagnosis: Primary osteoarthritis of right knee  Recent Surgeries: ARTHROPLASTY, KNEE, TOTAL, USING COMPUTER-ASSISTED NAVIGATION: QUYEN: RIGHT: MARTHA - TRIATHALON (Right)    General Precautions: Standard, fall  Orthopedic Precautions: RLE weight bearing as tolerated  Braces: N/A    Recommendations:     Discharge Recommendations: home health PT  Level of Assistance Recommended at Discharge: Intermittent assistance   Discharge Equipment Recommendations: none  Barriers to discharge: Decreased caregiver support    Assessment:     Gerda Lai is a 89 y.o. female admitted with a medical diagnosis of Primary osteoarthritis of right knee. Patient agreeable to PT treatment this AM. Patient reports noted improvement in mobility over course of therapy. Patient demonstrates improved tolerance for ambulation as evidenced by increased gait distance traveled. Patient will benefit from continued SNF rehabilitation services to address deficits as well as progress mobility towards PLOF for safe transition to home environment at time of discharge.       Performance deficits affecting function: weakness, impaired endurance, impaired self care skills, impaired functional mobility, gait instability, impaired balance, decreased lower extremity function, decreased safety awareness, decreased ROM, edema, orthopedic precautions.    Rehab Potential is good    Activity Tolerance: Good    Plan:     Patient to be seen 5 x/week to address the above listed problems via gait training, therapeutic activities, therapeutic exercises, neuromuscular re-education, wheelchair management/training    Plan of Care Expires: 06/26/23  Plan of Care Reviewed with: patient    Subjective     "I am doing better each day."     Pain/Comfort:  Pain Rating 1: 0/10  Pain Rating Post-Intervention 1: 0/10    Patient's cultural, spiritual, Buddhist conflicts " "given the current situation:  no    Objective:     Communicated with nursing staff prior to session.  Patient found sitting edge of bed with  (no active lines) upon PT entry to room.     Therapeutic Activities and Exercises:   PT supervised patient donning underwear and pants seated EOB. Patient then completed LB dressing in standing. Patient donned bra and shirt seated EOB.     Seated BLE exercises x 15 reps including ankle DF/PF, LAQs, hip flexion.    LE ergometer x 10 minutes at moderate resistance for LE strengthening, ROM, and endurance; no rest break required.     Functional Mobility:  Transfers:     Sit to Stand:  supervision with rolling walker  Bed to Chair: supervision with  rolling walker  using  Step Transfer  Gait: Patient ambulated 212 feet using RW with SBA/Supervision. Patient demonstrating swing through gait pattern. No LOB.   Balance: SBA for dynamic standing balance with minimal excursions. Supervision for static standing balance with RW.   Stairs:  Pt ascended/descended 6" curb step with Rolling Walker with no handrails with Stand-by Assistance.   Wheelchair Propulsion:  Pt propelled Standard wheelchair x 55 feet on Level tile with  Bilateral upper extremity with Supervision or Set-up Assistance.     AM-PAC 6 CLICK MOBILITY  17    Patient left up in chair with call button in reach and nursing staff notified.    GOALS:   Multidisciplinary Problems       Physical Therapy Goals          Problem: Physical Therapy    Goal Priority Disciplines Outcome Goal Variances Interventions   Physical Therapy Goal     PT, PT/OT Ongoing, Progressing     Description: Goals to be met by: 23     Patient will increase functional independence with mobility by performin. Supine to sit with Modified [place]  2. Sit to supine with Modified [place]  3. Rolling to Left and Right with [place]  4. Sit to stand transfer with Modified [place] - not met, progressing  5. Bed to chair transfer with " Modified [place] using Rolling Walker - not met, progressing  6. Gait  x 150 feet with Supervision using Rolling Walker - not met, progressing  7. Wheelchair propulsion x 50 feet with Supervision using bilateral upper extremities - MET  8. Ascend/Descend 6 inch/8 inch curb step with Stand-by Assistance using Rolling Walker - 6 inch MET  9. Lower extremity exercise program 3 x 10 reps per handout, with assistance as needed                         Time Tracking:     PT Received On: 06/01/23  PT Start Time: 0936  PT Stop Time: 1020  PT Total Time (min): 44 min    Billable Minutes: Gait Training 12, Therapeutic Activity 12, and Therapeutic Exercise 20    Treatment Type: Treatment  PT/PTA: PT     Number of PTA visits since last PT visit: 0     06/01/2023

## 2023-06-01 NOTE — PROGRESS NOTES
Ms. Lai was seen at [facility] today for a post-operative visit after undergoing Right Total Knee Arthroplasty (MAKOplasty by Dr. Cobian on 5/22/2023.      Interval History:  She reports that she is doing well.  Pain is controlled.  She is taking pain medication.  Tylenol   She denies fever, chills, and sweats since the time of the surgery.  She is participating in her therapy sessions.     Physical exam:  Dressing is clean, dry and intact.   She has tactile stimulation to their lower leg, she denies calf pain, there is no leg edema and their pedal pulse is palpable x 2.     RADS: none done today    Assessment:  Post-op visit (1 weeks)    Plan:  Current care, treatment plan, precautions, activity level/ modifications, limitations, rehabilitation exercises and proposed future treatment were discussed with the patient. We discussed the need to monitor for changes in symptoms and condition and report them to the physician.  Discussed importance of compliance with all appointments and follow up examinations.     WOUND CARE ORDERS  Do not remove surgical dressing for 2 weeks post-op. This will be done only by MD at initial post-op visit. If dressing is completely saturated, Call number below.      Do not get dressings wet. Do not shower.      If dressing continues to be saturated or there are signs of infection, please call Ortho Clinic [phone] for further instructions and to make appt to be seen.         PHYSICAL THERAPY:    - Continue therapy as ordered.        - weight bearing as tolerated         - range of motion as tolerated.      PAIN MEDICATION:               - Pain medication: refill was not needed,               - Pain medication refill policy provided to patient for review, yes      DVT PROPHYLAXIS:               - aspirin     FOLLOW UP:   - Patient will continue to be seen on [facility] weekly until their discharge then he is to return to clinic for follow up.  - Future Appointments:   Future Appointments   Date  Time Provider Department Center   6/6/2023  2:00 PM Stacy Harris NP [facility] ORTHO [address]   6/26/2023  3:00 PM Bouchra Curtis MD [facility] DERM [place]   7/6/2023  3:20 PM Milton Cobian III, MD [facility] ORTHO [address]   7/18/2023  1:30 PM Andi Cisneros DO [facility] PAINMA [place]   8/15/2023  3:20 PM Milton Cobian III, MD [facility] ORTHO [address]   9/11/2023  2:45 PM Milton Mandujano MD [facility] DERM [place]   11/2/2023  2:00 PM Tomas Beltran MD [facility] IM [place]           If there are any questions prior to scheduled follow up, the patient was instructed to contact the office

## 2023-06-01 NOTE — PLAN OF CARE
Family Training     Patient Name:  Gerda Lai   MRN:  [id number]  Admit Date: 5/26/2023      Rehab tech attempted to schedule family training this date. Unable to schedule due to pt polite refusal of training. Educated pt on benefits of family training prior to discharge. Pt verbalized understanding but continued to decline training session.    6/1/2023

## 2023-06-01 NOTE — PROGRESS NOTES
[facility]                                  Skilled Nursing Facility                   Progress Note     Admit Date: 5/26/2023  LEONARDO 6/9/2023  Principal Problem:  Primary osteoarthritis of right knee   HPI obtained from patient interview and chart review     Chief Complaint: Re-evaluation of medical treatment and therapy status: Lab review    HPI:   Gerda Lai is a 89 year old female PMHx of CKD stage 3, anxiety, arthritis, HLD, HTN, and Right knee pain who presents to SNF following hospitalization for primary osteoarthritis of right knee s/p right total knee arthroplasty on 05/22/2023 with Dr. Cobian.  Admission to SNF for secondary weakness and debility.    Interval history:   All of today's labs reviewed and are listed below.  24 hr vital sign ranges listed below.  Patient states pain is well controlled at this time.  Patient denies shortness of breath, abdominal discomfort, nausea, or vomiting.  Patient reports an adequate appetite.  Patient denies dysuria.  Patient reports having regular bowel movements.  Patient progessing with PT/OT- ambulated ~175 ft with stand by assistance and rolling walker. Continuing to follow and treat all acute and chronic conditions.    Past Medical History: Patient has a past medical history of Anxiety, Arthritis, Basal cell carcinoma (09/2016), Basal cell carcinoma of right forearm (04/19/2023), Breast cancer (1992), Cataract, Fibromyalgia, History of measles as a child, Hyperlipidemia, Hypertension, Personal history of colonic polyps, Pneumonia, SCC (squamous cell carcinoma) (2015), SCC (squamous cell carcinoma) (2016), SCC (squamous cell carcinoma) (2017), SCC (squamous cell carcinoma) (2022), Shingles, Squamous cell carcinoma (2015), Thyroid disease, and Vaginitis.    Past Surgical History: Patient has a past surgical history that includes thyriodectomy; Total hip arthroplasty; tonsillectomy;  Adenoidectomy; Cataract extraction w/  intraocular lens implant (Bilateral); Yag  (Left); Breast lumpectomy (Right, 1992); Breast biopsy (Left); Eye surgery; Tonsillectomy; Joint replacement (Right); Knee Arthroplasty (Left, 8/10/2020); Carpal tunnel release (Right, 3/16/2021); Hand surgery; Injection of anesthetic agent around medial branch nerves innervating lumbar facet joint (Left, 11/18/2021); Injection of anesthetic agent around medial branch nerves innervating cervical facet joint (N/A, 1/4/2022); Radiofrequency thermal coagulation of medial branch of posterior ramus of cervical spinal nerve (Left, 3/8/2022); Epidural steroid injection (N/A, 2/24/2023); and arthroplasty, knee, total, using computer-assisted navigation (Right, 5/22/2023).    Social History: Patient reports that she has never smoked. She has never been exposed to tobacco smoke. She has never used smokeless tobacco. She reports current alcohol use. She reports that she does not use drugs.    Family History: family history includes Breast cancer in her mother; Cancer in her mother and paternal aunt; Glaucoma in her paternal grandmother; Heart disease in her father and paternal aunt; Stroke in her paternal grandfather.    Allergies: Patient is allergic to sulfa (sulfonamide antibiotics), clarithromycin, flexeril [cyclobenzaprine], iodine and iodide containing products, lisinopril, losartan, metoprolol, spironolactone, tramadol, verapamil (bulk), and voltaren [diclofenac sodium].    ROS  Constitutional: Negative for fever   Eyes: Negative for blurred vision, double vision   Respiratory: Negative for cough, shortness of breath   Cardiovascular: Negative for chest pain, palpitations, and leg swelling.   Gastrointestinal: Negative for abdominal pain, constipation, diarrhea, nausea, vomiting.   Genitourinary: Negative for dysuria, frequency   Musculoskeletal:  + generalized weakness.  +right knee pain  Skin: Negative for itching and rash.    Neurological: Negative for dizziness, headaches.   Psychiatric/Behavioral: Negative for depression. The patient is not nervous/anxious.      24 hour Vital Sign Range   Temp:  [97.5 °F (36.4 °C)-98.1 °F (36.7 °C)]   Pulse:  [59-70]   Resp:  [16-18]   BP: (140-171)/(61-73)   SpO2:  [97 %-98 %]     Current BMI: Body mass index is 24.37 kg/m².    PEx  Constitutional: Patient appears debilitated.  No distress noted  HENT:   Head: Normocephalic and atraumatic.   Eyes: Pupils are equal, round  Neck: Normal range of motion. Neck supple.   Cardiovascular: Normal rate, regular rhythm and normal heart sounds.    Pulmonary/Chest: Effort normal and breath sounds are clear  Abdominal: Soft. Bowel sounds are normal.   Musculoskeletal: Normal range of motion.   Neurological: Alert and oriented to person, place, and time.   Psychiatric: Normal mood and affect. Behavior is normal.   Skin: Skin is warm and dry.  Surgical dressing to right knee, only to be removed by Ortho.    Recent Labs   Lab 06/01/23  0453      K 4.2      CO2 24   BUN 29*   CREATININE 0.9   MG 1.9       Recent Labs   Lab 06/01/23  0453   WBC 7.44   RBC 2.50*   HGB 8.1*   HCT 25.4*      *   MCH 32.4*   MCHC 31.9*       No results for input(s): POCTGLUCOSE in the last 168 hours.     Assessment and Plan:    Primary osteoarthritis of right knee  S/p right TKA on 05/22  - PT/OT, WBAT  - DVT PPX with ASA 81 mg BID times 30 days  - postop prophylactic antibiotics, cefadroxil 500 mg BID x7 days  - ortho JOE to see pt weekly at [facility]  - do not remove dressing at [facility]- will be done after DC or by Ortho  - follow-up with orthopedics after discharge    Acute postoperative pain  - stable, continue acetaminophen 650 mg q.8 hours, oxycodone 5 or 10 mg q.6 hours PRN, methocarbamol 750 mg TID prn    CKD stage 3  - stable, continue monitor twice weekly BMPs, avoid nephrotoxic agents, renally dose medications when appropriate    HTN  - stable, continue  amlodipine 5 mg daily (in place of home non formulary felodipine 5 mg daily), labetalol 100 mg BID, Aldactone 25 mg    HLD  - continue pravastatin 80 mg daily    GERD  - continue famotidine 20 mg daily    Debility   - Continue with PT/OT for gait training and strengthening and restoration of ADL's   - Encourage mobility, OOB in chair, and early ambulation as appropriate  - Fall precautions   - Monitor for bowel and bladder dysfunction  - Monitor for and prevent skin breakdown and pressure ulcers  - Continue DVT prophylaxis with ASA 81 mg BID, frequent ambulation      Anticipate disposition:  Home with home health      Follow-up needed during SNF stay-orthopedics (6/6) unless otherwise specified, derm 6/26 if still at SNF    Follow-up needed after discharge from SNF: PCP, Orthopedics    Future Appointments   Date Time Provider Department Center   6/6/2023  2:00 PM Stacy Harris NP [facility] ORTHO [address]   6/26/2023  3:00 PM Bouchra Curtis MD [facility] DERM [place]   7/6/2023  3:20 PM Milton Cobian III, MD [facility] ORTHO [address]   7/18/2023  1:30 PM Andi Cisneros DO [facility] PAINMA [place]   8/15/2023  3:20 PM Milton Cobian III, MD [facility] ORTHO [address]   9/11/2023  2:45 PM Miltno Mandujano MD [facility] DERM [place]   11/2/2023  2:00 PM Tomas Beltran MD [facility] IM Marilyn Arango NP  Department of Hospital Medicine   [facility]- Skilled Nursing Facility     DOS: 6/1/2023       Patient note was created using MModal Dictation.  Any errors in syntax or even information may not have been identified and edited on initial review prior to signing this note.

## 2023-06-01 NOTE — TREATMENT PLAN
[facility]hab Services' DME recommendations    Gerda Lai  MRN: [id number]    Patient reports having all equipment needed.     [x]  No DME needed    [x] Home health PT, OT, and Aide    Amber Hamlin, PT 6/1/2023

## 2023-06-01 NOTE — NURSING
Pt admitted to Skilled Nursing for Unilateral primary osteoarthritis,*. Patient is AA0X4. Receiving daily PT/OT for strengthening, balance, gait training and ambulation. Pt currently requiring  1,2,3,4: 1 person assistance,for transfers and ambulation. Patient also requiring 1 person assistance with dressing and set up for grooming and eating. Daily skilled nursing interventions include pain management, medication management, surgical wound management and ADL assistance. Currently pain is being managed by Pain medications prescribed. and rates pain PAIN 0-10: 2. Pt is on a Diet (All options): Regular diet, tolerating well. Care plan reviewed and updated. No complaints at this time, will continue to update care and monitor.

## 2023-06-01 NOTE — PLAN OF CARE
Continue with POC.     Problem: Physical Therapy  Goal: Physical Therapy Goal  Description: Goals to be met by: 23     Patient will increase functional independence with mobility by performin. Supine to sit with Modified [place]  2. Sit to supine with Modified [place]  3. Rolling to Left and Right with [place]  4. Sit to stand transfer with Modified [place] - not met, progressing  5. Bed to chair transfer with Modified [place] using Rolling Walker - not met, progressing  6. Gait  x 150 feet with Supervision using Rolling Walker - not met, progressing  7. Wheelchair propulsion x 50 feet with Supervision using bilateral upper extremities - MET  8. Ascend/Descend 6 inch/8 inch curb step with Stand-by Assistance using Rolling Walker - 6 inch MET  9. Lower extremity exercise program 3 x 10 reps per handout, with assistance as needed    Outcome: Ongoing, Progressing   2023

## 2023-06-01 NOTE — PT/OT/SLP PROGRESS
Occupational Therapy   Treatment    Name: Gerda Lai  MRN: [id number]  Admit Date: 5/26/2023  Admitting Diagnosis:  Primary osteoarthritis of right knee    General Precautions: Standard, fall   Orthopedic Precautions: RLE weight bearing as tolerated   Braces: N/A    Recommendations:     Discharge Recommendations:  home health OT  Level of Assistance Recommended at Discharge: Intermittent supervision  Discharge Equipment Recommendations: none  Barriers to discharge:  Decreased caregiver support    Assessment:     Gerda Lai is a 89 y.o. female with a medical diagnosis of Primary osteoarthritis of right knee.  She presents with limitations in performance of self-care, functional mobility, and ADLs. Performance deficits affecting function are impaired endurance, weakness, impaired self care skills, gait instability, impaired balance, decreased coordination, decreased upper extremity function, decreased lower extremity function, decreased safety awareness, decreased ROM, impaired coordination, impaired skin, edema, orthopedic precautions. Pt tolerated Tx without incident and is making progress but continues to require assist to perform self care tasks, functional mobility and functional transfers. Pt would continue to benefit from OT intervention to further functional (I)ce and safety.     Rehab Potential is good    Activity tolerance:  Good    Plan:     Patient to be seen 5 x/week to address the above listed problems via self-care/home management, therapeutic activities, therapeutic exercises    Plan of Care Expires: 06/26/23  Plan of Care Reviewed with: patient    Subjective     Communicated with: Nursing prior to session.     Pain/Comfort:  Pain Rating 1: 0/10  Pain Rating Post-Intervention 1: 0/10    Patient's cultural, spiritual, Confucianist conflicts given the current situation:  no    Objective:     Patient found  up in bedside chair  with  (no lines) upon OT entry to room.    Bed Mobility:    Pt seated in  chair at onset of treatment session.     Functional Mobility/Transfers:  Patient completed Sit <> Stand Transfer with supervision  with  rolling walker   Patient completed Chair <> Bedside Chair Step Transfer technique with supervision with rolling walker  Patient completed Toilet Transfer Step Transfer technique with supervision with  rolling walker  Functional Mobility: Pt ambulated from room to therapy gym with sup and RW for a total of 135 ft.     Activities of Daily Living:  Toileting: modified independence      [facility] 6 Click ADL: 19    OT Exercises: Wall Pulleys performed to increase functional ROM for reaching to increase independence with self care tasks.   Pt performed UBE exercise for 10 minutes with Min resistance.  UE exercises performed to increase functional endurance and strength in order increase independence when performing self care tasks, functional ambulation, W/C propulsion, and functional standing activities .     Treatment & Education:  Pt participated in gross motor standing activity with SBA and RW. Pt with balloon volleyball activity with focus on standing tolerance, functional reaching, dynamic standing bal, crossing midline, and to promote independence with homemaking and self care tasks.     Pt educated on:  - role of OT  - level of assistance  - energy conservation and task modification to maximized independence with ADL's and mobility   -  safety while performing functional transfers and self care tasks  - progress towards OT goals    Patient left  up in bedside chair  with call button in reach    GOALS:   Multidisciplinary Problems       Occupational Therapy Goals          Problem: Occupational Therapy    Goal Priority Disciplines Outcome Interventions   Occupational Therapy Goal     OT, PT/OT Ongoing, Progressing    Description: Goals to be met by: 06/27/23     Patient will increase functional independence with ADLs by performing:    UE Dressing with Modified [place].  MARIA LUISA  Dressing with Modified [place].  Grooming while standing at sink with Modified [place].  Toileting from toilet with Modified [place] for hygiene and clothing management.   Bathing from  sitting at sink with Modified [place].  Toilet transfer to toilet with Modified [place].  Upper extremity exercise program 3x15 reps per handout, with independence.                         Time Tracking:     OT Date of Treatment: 06/01/23  OT Start Time: 1324    OT Stop Time: 1409  OT Total Time (min): 45 min    Billable Minutes:Self Care/Home Management 10  Therapeutic Activity 15  Therapeutic Exercise 20    6/1/2023

## 2023-06-01 NOTE — PLAN OF CARE
[facility] Skilled Nursing      HOME HEALTH ORDERS  FACE TO FACE ENCOUNTER    Patient Name: Gerda Lai  YOB: 1933    PCP: Tomas Beltran MD   PCP Address: [address] / [place] [address]  PCP Phone Number: [phone]  PCP Fax: [phone]    Encounter Date: 5/26/23    Admit to Home Health    Diagnoses:  Active Hospital Problems    Diagnosis  POA    *Primary osteoarthritis of right knee [M17.11]  Yes     Moderate to severe DJD with some progression of joint space narrowing at the lateral compartment of the tibiofemoral joint space on the right.  Previously described left suprapatellar effusion is no longer present.  In addition, the previously described calcification above the left patella is also no longer seen.      Status post total right knee replacement [Z96.651]  Not Applicable    Macrocytic anemia [D53.9]  Yes    Carotid arterial disease [I77.9]  Yes     Chronic    Anxiety [F41.9]  Yes     Chronic    CKD (chronic kidney disease), stage III [N18.30]  Yes     Chronic    HLD (hyperlipidemia) [E78.5]  Yes     Chronic    Hypertension, essential [I10]  Yes     Chronic     Note that she has an auscultatory gap.  BP is difficult to control and she has been intolerant to several meds.  BP goal is <150/90        Resolved Hospital Problems   No resolved problems to display.       Follow Up Appointments:  Future Appointments   Date Time Provider Department Center   6/6/2023  2:00 PM Stacy Harris NP [facility] ORTHO [address] [place]   6/26/2023  3:00 PM Bouchra Curtis MD [facility] DERM [place]   7/6/2023  3:20 PM Milton Cobian III, MD [facility] ORTHO [address] [place]   7/18/2023  1:30 PM Andi Cisneros DO [facility] PAINMA [place]   8/15/2023  3:20 PM Milton Cobian III, MD [facility] ORTHO [address]   9/11/2023  2:45 PM Milton Mandujano MD [facility] DERM [place]   11/2/2023  2:00 PM Tomas Beltran MD [facility] IM [place]       Allergies:  Review of patient's allergies indicates:   Allergen Reactions     Sulfa (sulfonamide antibiotics) Rash    Clarithromycin Other (See Comments)     Weak, extreme fatigue. dizziness    Flexeril [cyclobenzaprine] Other (See Comments)     Dizziness    Iodine and iodide containing products     Lisinopril Other (See Comments)     Cough and sensation of throat swelling/?angioedema    Losartan Rash    Metoprolol Swelling     Tightness in throat    Spironolactone      Throat tightening    Tramadol Other (See Comments)     Dizzy and weak    Verapamil (bulk) Palpitations    Voltaren [diclofenac sodium] Other (See Comments)     Drops blood pressure       Medications: Review discharge medications with patient and family and provide education.      I have seen and examined this patient within the last 30 days. My clinical findings that support the need for the home health skilled services and home bound status are the following:no   Weakness/numbness causing balance and gait disturbance due to Surgery making it taxing to leave home.     Referrals/ Consults  Physical Therapy to evaluate and treat. Evaluate for home safety and equipment needs; Establish/upgrade home exercise program. Perform / instruct on therapeutic exercises, gait training, transfer training, and Range of Motion.  Occupational Therapy to evaluate and treat. Evaluate home environment for safety and equipment needs. Perform/Instruct on transfers, ADL training, ROM, and therapeutic exercises.  Aide to provide assistance with personal care, ADLs, and vital signs.    Activities:   activity as tolerated    Nursing:   Agency to admit patient within 24 hours of hospital discharge unless specified on physician order or at patient request    SN to complete comprehensive assessment including routine vital signs. Instruct on disease process and s/s of complications to report to MD. Review/verify medication list sent home with the patient at time of discharge  and instruct patient/caregiver as needed. Frequency may be adjusted depending on  start of care date.     Skilled nurse to perform up to 3 visits PRN for symptoms related to diagnosis    Notify MD if SBP > 160 or < 90; DBP > 90 or < 50; HR > 120 or < 50; Temp > 101; O2 < 88%    Ok to schedule additional visits based on staff availability and patient request on consecutive days within the home health episode.    Home Health Aide:  Nursing Three times weekly, Physical Therapy Three times weekly, Occupational Therapy Three times weekly, and Home Health Aide Three times weekly    Wound Care Orders    Once bandage removed by Ortho- Keep incision clean and dry.  Cleanse with soap and water daily, pat dry.  Do not submerge under water until cleared by Ortho.       I certify that this patient is confined to her home and needs intermittent skilled nursing care, physical therapy, and occupational therapy.

## 2023-06-02 PROCEDURE — 94761 N-INVAS EAR/PLS OXIMETRY MLT: CPT

## 2023-06-02 PROCEDURE — 11000004 HC SNF PRIVATE

## 2023-06-02 PROCEDURE — 25000242 PHARM REV CODE 250 ALT 637 W/ HCPCS: Performed by: NURSE PRACTITIONER

## 2023-06-02 PROCEDURE — 25000003 PHARM REV CODE 250: Performed by: NURSE PRACTITIONER

## 2023-06-02 PROCEDURE — 25000003 PHARM REV CODE 250: Performed by: HOSPITALIST

## 2023-06-02 PROCEDURE — 97530 THERAPEUTIC ACTIVITIES: CPT | Mod: CO

## 2023-06-02 PROCEDURE — 97110 THERAPEUTIC EXERCISES: CPT | Mod: CO

## 2023-06-02 RX ORDER — OXYCODONE HYDROCHLORIDE 5 MG/1
5 TABLET ORAL EVERY 6 HOURS PRN
Qty: 20 TABLET | Refills: 0 | OUTPATIENT
Start: 2023-06-02

## 2023-06-02 RX ORDER — DEXTROMETHORPHAN HYDROBROMIDE, GUAIFENESIN 5; 100 MG/5ML; MG/5ML
650 LIQUID ORAL EVERY 8 HOURS PRN
Qty: 120 TABLET | Refills: 0 | OUTPATIENT
Start: 2023-06-02

## 2023-06-02 RX ADMIN — CEFADROXIL 500 MG: 500 CAPSULE ORAL at 09:06

## 2023-06-02 RX ADMIN — ALISKIREN HEMIFUMARATE 300 MG: 150 TABLET, FILM COATED ORAL at 09:06

## 2023-06-02 RX ADMIN — FAMOTIDINE 20 MG: 20 TABLET ORAL at 09:06

## 2023-06-02 RX ADMIN — LABETALOL HYDROCHLORIDE 100 MG: 100 TABLET, FILM COATED ORAL at 09:06

## 2023-06-02 RX ADMIN — PSYLLIUM HUSK 1 PACKET: 3.4 POWDER ORAL at 09:06

## 2023-06-02 RX ADMIN — OXYCODONE HYDROCHLORIDE 5 MG: 5 TABLET ORAL at 04:06

## 2023-06-02 RX ADMIN — ASPIRIN 81 MG: 81 TABLET, COATED ORAL at 09:06

## 2023-06-02 RX ADMIN — ACETAMINOPHEN 650 MG: 325 TABLET ORAL at 09:06

## 2023-06-02 RX ADMIN — THERA TABS 1 TABLET: TAB at 09:06

## 2023-06-02 RX ADMIN — AMLODIPINE BESYLATE 5 MG: 5 TABLET ORAL at 09:06

## 2023-06-02 RX ADMIN — SPIRONOLACTONE 25 MG: 25 TABLET, FILM COATED ORAL at 05:06

## 2023-06-02 RX ADMIN — Medication 100 MG: at 09:06

## 2023-06-02 RX ADMIN — PRAVASTATIN SODIUM 80 MG: 20 TABLET ORAL at 09:06

## 2023-06-02 RX ADMIN — CHOLECALCIFEROL TAB 25 MCG (1000 UNIT) 1000 UNITS: 25 TAB at 09:06

## 2023-06-02 RX ADMIN — ACETAMINOPHEN 650 MG: 325 TABLET ORAL at 02:06

## 2023-06-02 RX ADMIN — LORAZEPAM 0.5 MG: 0.5 TABLET ORAL at 09:06

## 2023-06-02 NOTE — PT/OT/SLP PROGRESS
Occupational Therapy   Treatment    Name: Gerda Lai  MRN: [id number]  Admit Date: 5/26/2023  Admitting Diagnosis:  Primary osteoarthritis of right knee    General Precautions: Standard, fall   Orthopedic Precautions: RLE weight bearing as tolerated   Braces: N/A    Recommendations:     Discharge Recommendations:  home health OT  Level of Assistance Recommended at Discharge: Intermittent supervision  Discharge Equipment Recommendations: none  Barriers to discharge:  Decreased caregiver support    Assessment:     Gerda Lai is a 89 y.o. female with a medical diagnosis of Primary osteoarthritis of right knee.  She presents with limitations in performance of self-care, functional mobility, and ADLs. Performance deficits affecting function are impaired endurance, weakness, impaired self care skills, gait instability, impaired balance, decreased coordination, decreased upper extremity function, decreased lower extremity function, decreased safety awareness, decreased ROM, impaired coordination, impaired skin, edema, orthopedic precautions. Pt tolerated Tx without incident and is making progress but continues to require assist to perform self care tasks, functional mobility and functional transfers. Pt would continue to benefit from OT intervention to further functional (I)ce and safety.     Rehab Potential is good    Activity tolerance:  Good    Plan:     Patient to be seen 5 x/week to address the above listed problems via self-care/home management, therapeutic activities, therapeutic exercises    Plan of Care Expires: 06/26/23  Plan of Care Reviewed with: patient    Subjective     Communicated with: Nursing prior to session.     Pain/Comfort:  Pain Rating 1: 0/10  Location - Side 1: Right  Location - Orientation 1: generalized  Location 1: knee  Pain Addressed 1: Distraction, Reposition  Pain Rating Post-Intervention 1: 1/10    Patient's cultural, spiritual, Anglican conflicts given the current  situation:  no    Objective:     Patient found  up in bedside chair  with  (no lines) upon OT entry to room.    Bed Mobility:    Pt seated in chair at onset of treatment session.     Functional Mobility/Transfers:  Patient completed Sit <> Stand Transfer with supervision  with  rolling walker   Patient completed Bedside chair <> Chair Transfer using Stand Pivot technique with supervision with rolling walker  Functional Mobility: Pt propelled W/C in hallway with sup for a total of 85 ft using BUE to propel chair. Pt ambulated in hallway to room for a total of 205 ft with RW and sup.     [facility] 6 Click ADL: 19    OT Exercises: Pt performed bilateral UE exercise for with 2 lb dowel through functional ROM with repetition of 10 x3.  UE exercises performed to increase functional endurance and strength in order increase independence when performing self care tasks, functional ambulation, W/C propulsion, and functional standing activities .    Treatment & Education:  Pt participated in standing activity with sup and RW. Pt at raised counter to perform fine motor and visual scanning activity with focus on standing tolerance, functional reaching, dynamic standing bal, crossing midline, and to promote independence with homemaking and self care tasks. Pt tolerated standing for 8 min and 41 sec     Pt educated on:  - role of OT  - level of assistance  - energy conservation and task modification to maximized independence with ADL's and mobility   -  safety while performing functional transfers and self care tasks  - progress towards OT goals    Patient left  up in bedside chair  with call button in reach    GOALS:   Multidisciplinary Problems       Occupational Therapy Goals          Problem: Occupational Therapy    Goal Priority Disciplines Outcome Interventions   Occupational Therapy Goal     OT, PT/OT Ongoing, Progressing    Description: Goals to be met by: 06/27/23     Patient will increase functional independence with ADLs by  performing:    UE Dressing with Modified [place].  LE Dressing with Modified [place].  Grooming while standing at sink with Modified [place].  Toileting from toilet with Modified [place] for hygiene and clothing management.   Bathing from  sitting at sink with Modified [place].  Toilet transfer to toilet with Modified [place].  Upper extremity exercise program 3x15 reps per handout, with independence.                         Time Tracking:     OT Date of Treatment: 06/02/23  OT Start Time: 1002    OT Stop Time: 1049  OT Total Time (min): 47 min    Billable Minutes:Therapeutic Activity 35  Therapeutic Exercise 12    6/2/2023

## 2023-06-02 NOTE — PROGRESS NOTES
[facility]                                  Skilled Nursing Facility                   Progress Note     Admit Date: 5/26/2023  LEONARDO 6/6/2023  Principal Problem:  Primary osteoarthritis of right knee   HPI obtained from patient interview and chart review     Chief Complaint: Re-evaluation of medical treatment and therapy status    HPI:   Gerda Lai is a 89 year old female PMHx of CKD stage 3, anxiety, arthritis, HLD, HTN, and Right knee pain who presents to SNF following hospitalization for primary osteoarthritis of right knee s/p right total knee arthroplasty on 05/22/2023 with Dr. Cobian.  Admission to SNF for secondary weakness and debility.    Interval history:   24 hr vital sign ranges listed below.  Patient states pain is well controlled at this time.  Patient denies shortness of breath, abdominal discomfort, nausea, or vomiting.  Patient reports an adequate appetite.  Patient denies dysuria.  Patient reports having regular bowel movements.  Patient progessing with PT/OT- ambulated ~175 ft with stand by assistance and rolling walker. Continuing to follow and treat all acute and chronic conditions.    Past Medical History: Patient has a past medical history of Anxiety, Arthritis, Basal cell carcinoma (09/2016), Basal cell carcinoma of right forearm (04/19/2023), Breast cancer (1992), Cataract, Fibromyalgia, History of measles as a child, Hyperlipidemia, Hypertension, Personal history of colonic polyps, Pneumonia, SCC (squamous cell carcinoma) (2015), SCC (squamous cell carcinoma) (2016), SCC (squamous cell carcinoma) (2017), SCC (squamous cell carcinoma) (2022), Shingles, Squamous cell carcinoma (2015), Thyroid disease, and Vaginitis.    Past Surgical History: Patient has a past surgical history that includes thyriodectomy; Total hip arthroplasty; tonsillectomy; Adenoidectomy; Cataract extraction w/  intraocular lens implant  (Bilateral); Yag  (Left); Breast lumpectomy (Right, 1992); Breast biopsy (Left); Eye surgery; Tonsillectomy; Joint replacement (Right); Knee Arthroplasty (Left, 8/10/2020); Carpal tunnel release (Right, 3/16/2021); Hand surgery; Injection of anesthetic agent around medial branch nerves innervating lumbar facet joint (Left, 11/18/2021); Injection of anesthetic agent around medial branch nerves innervating cervical facet joint (N/A, 1/4/2022); Radiofrequency thermal coagulation of medial branch of posterior ramus of cervical spinal nerve (Left, 3/8/2022); Epidural steroid injection (N/A, 2/24/2023); and arthroplasty, knee, total, using computer-assisted navigation (Right, 5/22/2023).    Social History: Patient reports that she has never smoked. She has never been exposed to tobacco smoke. She has never used smokeless tobacco. She reports current alcohol use. She reports that she does not use drugs.    Family History: family history includes Breast cancer in her mother; Cancer in her mother and paternal aunt; Glaucoma in her paternal grandmother; Heart disease in her father and paternal aunt; Stroke in her paternal grandfather.    Allergies: Patient is allergic to sulfa (sulfonamide antibiotics), clarithromycin, flexeril [cyclobenzaprine], iodine and iodide containing products, lisinopril, losartan, metoprolol, spironolactone, tramadol, verapamil (bulk), and voltaren [diclofenac sodium].    ROS  Constitutional: Negative for fever   Eyes: Negative for blurred vision, double vision   Respiratory: Negative for cough, shortness of breath   Cardiovascular: Negative for chest pain, palpitations, and leg swelling.   Gastrointestinal: Negative for abdominal pain, constipation, diarrhea, nausea, vomiting.   Genitourinary: Negative for dysuria, frequency   Musculoskeletal:  + generalized weakness.  +right knee pain  Skin: Negative for itching and rash.   Neurological: Negative for dizziness, headaches.   Psychiatric/Behavioral:  Negative for depression. The patient is not nervous/anxious.      24 hour Vital Sign Range   Temp:  [97.5 °F (36.4 °C)-97.6 °F (36.4 °C)]   Pulse:  [60-69]   Resp:  [18-20]   BP: (142-200)/(63-83)   SpO2:  [98 %-99 %]     Current BMI: Body mass index is 24.37 kg/m².    PEx  Constitutional: Patient appears debilitated.  No distress noted  HENT:   Head: Normocephalic and atraumatic.   Eyes: Pupils are equal, round  Neck: Normal range of motion. Neck supple.   Cardiovascular: Normal rate, regular rhythm and normal heart sounds.    Pulmonary/Chest: Effort normal and breath sounds are clear  Abdominal: Soft. Bowel sounds are normal.   Musculoskeletal: Normal range of motion.   Neurological: Alert and oriented to person, place, and time.   Psychiatric: Normal mood and affect. Behavior is normal.   Skin: Skin is warm and dry.  Surgical dressing to right knee, only to be removed by Ortho.    No results for input(s): GLUCOSE, NA, K, CL, CO2, BUN, CREATININE, MG in the last 24 hours.    Invalid input(s):  CALCIUM      No results for input(s): WBC, RBC, HGB, HCT, PLT, MCV, MCH, MCHC in the last 24 hours.      No results for input(s): POCTGLUCOSE in the last 168 hours.     Assessment and Plan:    Acute postoperative pain  - stable, continue acetaminophen 650 mg q.8 hours, oxycodone 5 or 10 mg q.6 hours PRN, methocarbamol 750 mg TID prn    CKD stage 3  - stable, continue monitor twice weekly BMPs, avoid nephrotoxic agents, renally dose medications when appropriate    HTN  - stable, continue amlodipine 5 mg daily (in place of home non formulary felodipine 5 mg daily), labetalol 100 mg BID, Aldactone 25 mg    Primary osteoarthritis of right knee  S/p right TKA on 05/22  - PT/OT, WBAT  - DVT PPX with ASA 81 mg BID times 30 days  - postop prophylactic antibiotics, cefadroxil 500 mg BID x7 days  - ortho JOE to see pt weekly at [facility]  - do not remove dressing at [facility]- will be done after DC or by Ortho  - follow-up with orthopedics after  discharge    HLD  - continue pravastatin 80 mg daily    GERD  - continue famotidine 20 mg daily    Debility   - Continue with PT/OT for gait training and strengthening and restoration of ADL's   - Encourage mobility, OOB in chair, and early ambulation as appropriate  - Fall precautions   - Monitor for bowel and bladder dysfunction  - Monitor for and prevent skin breakdown and pressure ulcers  - Continue DVT prophylaxis with ASA 81 mg BID, frequent ambulation      Anticipate disposition:  Home with home health      Follow-up needed during SNF stay-orthopedics (6/6) unless otherwise specified, derm 6/26 if still at SNF    Follow-up needed after discharge from SNF: PCP, Orthopedics    Future Appointments   Date Time Provider Department Center   6/6/2023  2:00 PM Stacy Harris NP [facility] [address]TH[address]   6/26/2023  3:00 PM Bouchra Curtis MD [facility] DERM [place]   7/6/2023  3:20 PM Milton Cobian III, MD [facility] ORTHO [address]   7/18/2023  1:30 PM Andi Cisneros DO [facility] PAINMA [place]   8/15/2023  3:20 PM Milton Cobian III, MD [facility] ORTHO [address]   9/11/2023  2:45 PM Milton Mandujano MD [facility] DERM [place]   11/2/2023  2:00 PM Tomas Beltran MD [facility] IM Marilyn Arango NP  Department of [facility] Medicine   [facility]     DOS: 6/2/2023       Patient note was created using MModal Dictation.  Any errors in syntax or even information may not have been identified and edited on initial review prior to signing this note.

## 2023-06-02 NOTE — PLAN OF CARE
Problem: Adult Inpatient Plan of Care  Goal: Absence of Hospital-Acquired Illness or Injury  Outcome: Ongoing, Progressing     Problem: Adult Inpatient Plan of Care  Goal: Optimal Comfort and Wellbeing  Outcome: Ongoing, Progressing     Problem: Adult Inpatient Plan of Care  Goal: Readiness for Transition of Care  Outcome: Ongoing, Progressing     Problem: Fall Injury Risk  Goal: Absence of Fall and Fall-Related Injury  Outcome: Ongoing, Progressing

## 2023-06-03 PROCEDURE — 25000242 PHARM REV CODE 250 ALT 637 W/ HCPCS: Performed by: NURSE PRACTITIONER

## 2023-06-03 PROCEDURE — 25000003 PHARM REV CODE 250: Performed by: HOSPITALIST

## 2023-06-03 PROCEDURE — 97530 THERAPEUTIC ACTIVITIES: CPT | Mod: CQ

## 2023-06-03 PROCEDURE — 97110 THERAPEUTIC EXERCISES: CPT | Mod: CQ

## 2023-06-03 PROCEDURE — 25000003 PHARM REV CODE 250: Performed by: NURSE PRACTITIONER

## 2023-06-03 PROCEDURE — 97116 GAIT TRAINING THERAPY: CPT | Mod: CQ

## 2023-06-03 PROCEDURE — 11000004 HC SNF PRIVATE

## 2023-06-03 RX ADMIN — LABETALOL HYDROCHLORIDE 100 MG: 100 TABLET, FILM COATED ORAL at 09:06

## 2023-06-03 RX ADMIN — ASPIRIN 81 MG: 81 TABLET, COATED ORAL at 10:06

## 2023-06-03 RX ADMIN — LORAZEPAM 0.5 MG: 0.5 TABLET ORAL at 09:06

## 2023-06-03 RX ADMIN — AMLODIPINE BESYLATE 5 MG: 5 TABLET ORAL at 10:06

## 2023-06-03 RX ADMIN — PRAVASTATIN SODIUM 80 MG: 20 TABLET ORAL at 10:06

## 2023-06-03 RX ADMIN — THERA TABS 1 TABLET: TAB at 10:06

## 2023-06-03 RX ADMIN — OXYCODONE HYDROCHLORIDE 5 MG: 5 TABLET ORAL at 04:06

## 2023-06-03 RX ADMIN — Medication 100 MG: at 10:06

## 2023-06-03 RX ADMIN — LABETALOL HYDROCHLORIDE 100 MG: 100 TABLET, FILM COATED ORAL at 10:06

## 2023-06-03 RX ADMIN — CALCIUM CARBONATE (ANTACID) CHEW TAB 500 MG 500 MG: 500 CHEW TAB at 09:06

## 2023-06-03 RX ADMIN — ACETAMINOPHEN 650 MG: 325 TABLET ORAL at 02:06

## 2023-06-03 RX ADMIN — ALISKIREN HEMIFUMARATE 300 MG: 150 TABLET, FILM COATED ORAL at 10:06

## 2023-06-03 RX ADMIN — PSYLLIUM HUSK 1 PACKET: 3.4 POWDER ORAL at 10:06

## 2023-06-03 RX ADMIN — FAMOTIDINE 20 MG: 20 TABLET ORAL at 10:06

## 2023-06-03 RX ADMIN — ACETAMINOPHEN 650 MG: 325 TABLET ORAL at 09:06

## 2023-06-03 RX ADMIN — CHOLECALCIFEROL TAB 25 MCG (1000 UNIT) 1000 UNITS: 25 TAB at 10:06

## 2023-06-03 RX ADMIN — ASPIRIN 81 MG: 81 TABLET, COATED ORAL at 09:06

## 2023-06-03 NOTE — PT/OT/SLP PROGRESS
"Physical Therapy Treatment    Patient Name:  Gerda Lai   MRN:  [id number]  Admit Date: 5/26/2023  Admitting Diagnosis: Primary osteoarthritis of right knee  Recent Surgeries:     General Precautions: Standard, fall  Orthopedic Precautions: RLE weight bearing as tolerated  Braces: N/A    Recommendations:     Discharge Recommendations: home health PT  Level of Assistance Recommended at Discharge: Intermittent assistance   Discharge Equipment Recommendations: none  Barriers to discharge: Decreased caregiver support    Assessment:     Gerda Lai is a 89 y.o. female admitted with a medical diagnosis of Primary osteoarthritis of right knee . Pt tolerated well, pt would continue to benefit from skilled PT services to improve overall functional mobility, strength and endurance.  .      Performance deficits affecting function: weakness, impaired endurance, impaired self care skills, impaired functional mobility, gait instability, impaired balance, decreased lower extremity function, decreased safety awareness, decreased ROM, edema, orthopedic precautions.    Rehab Potential is good    Activity Tolerance: Fair    Plan:     Patient to be seen 5 x/week to address the above listed problems via gait training, therapeutic activities, therapeutic exercises, neuromuscular re-education, wheelchair management/training    Plan of Care Expires: 06/26/23  Plan of Care Reviewed with: patient    Subjective     "I'm good, last night was not so good" agreeable to therapy.     Pain/Comfort:  Pain Rating 1: 2/10 (" 1-2  "not bad at all" with gait)  Location - Side 1: Right  Location - Orientation 1: generalized  Location 1: knee  Pain Addressed 1: Reposition, Distraction, Cessation of Activity  Pain Rating Post-Intervention 1: 2/10 (no further mentioned)    Patient's cultural, spiritual, Mormon conflicts given the current situation:  no    Objective:       Patient found with  (in wc) upon PT entry to room.     Therapeutic Activities " "and Exercises: 2x10 reps AP, GS,QS, LAQ,hip flex,abd/add, knee flex with cloth on floor  Mini elliptical x 10 minutes    Functional Mobility:  Transfers:     Sit to Stand:  supervision with rolling walker  Gait: amb with RW S ~ 150 ft no LOB step through gait pattern, good heel strike  Curb asc/sushant 6" curb with RW SBA    AM-PAC 6 CLICK MOBILITY  17    Patient left up in chair with call button in reach and belonging sin reach .    GOALS:   Multidisciplinary Problems       Physical Therapy Goals          Problem: Physical Therapy    Goal Priority Disciplines Outcome Goal Variances Interventions   Physical Therapy Goal     PT, PT/OT Ongoing, Progressing     Description: Goals to be met by: 23     Patient will increase functional independence with mobility by performin. Supine to sit with Modified [place]  2. Sit to supine with Modified [place]  3. Rolling to Left and Right with [place]  4. Sit to stand transfer with Modified [place] - not met, progressing  5. Bed to chair transfer with Modified [place] using Rolling Walker - not met, progressing  6. Gait  x 150 feet with Supervision using Rolling Walker - not met, progressing  7. Wheelchair propulsion x 50 feet with Supervision using bilateral upper extremities - MET  8. Ascend/Descend 6 inch/8 inch curb step with Stand-by Assistance using Rolling Walker - 6 inch MET  9. Lower extremity exercise program 3 x 10 reps per handout, with assistance as needed                         Time Tracking:     PT Received On: 23  PT Start Time: 1000  PT Stop Time: 1047  PT Total Time (min): 47 min    Billable Minutes: Gait Training 12, Therapeutic Activity 10, and Therapeutic Exercise 25    Treatment Type: Treatment  PT/PTA: PTA     Number of PTA visits since last PT visit: 2023  "

## 2023-06-04 PROCEDURE — 25000242 PHARM REV CODE 250 ALT 637 W/ HCPCS: Performed by: NURSE PRACTITIONER

## 2023-06-04 PROCEDURE — 25000003 PHARM REV CODE 250: Performed by: HOSPITALIST

## 2023-06-04 PROCEDURE — 25000003 PHARM REV CODE 250: Performed by: NURSE PRACTITIONER

## 2023-06-04 PROCEDURE — 11000004 HC SNF PRIVATE

## 2023-06-04 RX ADMIN — PSYLLIUM HUSK 1 PACKET: 3.4 POWDER ORAL at 09:06

## 2023-06-04 RX ADMIN — LORAZEPAM 0.5 MG: 0.5 TABLET ORAL at 09:06

## 2023-06-04 RX ADMIN — AMLODIPINE BESYLATE 5 MG: 5 TABLET ORAL at 09:06

## 2023-06-04 RX ADMIN — ASPIRIN 81 MG: 81 TABLET, COATED ORAL at 09:06

## 2023-06-04 RX ADMIN — LABETALOL HYDROCHLORIDE 100 MG: 100 TABLET, FILM COATED ORAL at 09:06

## 2023-06-04 RX ADMIN — Medication 100 MG: at 09:06

## 2023-06-04 RX ADMIN — PRAVASTATIN SODIUM 80 MG: 20 TABLET ORAL at 09:06

## 2023-06-04 RX ADMIN — CHOLECALCIFEROL TAB 25 MCG (1000 UNIT) 1000 UNITS: 25 TAB at 09:06

## 2023-06-04 RX ADMIN — THERA TABS 1 TABLET: TAB at 09:06

## 2023-06-04 RX ADMIN — ACETAMINOPHEN 650 MG: 325 TABLET ORAL at 10:06

## 2023-06-04 RX ADMIN — FAMOTIDINE 20 MG: 20 TABLET ORAL at 09:06

## 2023-06-04 RX ADMIN — SENNOSIDES AND DOCUSATE SODIUM 1 TABLET: 50; 8.6 TABLET ORAL at 09:06

## 2023-06-04 RX ADMIN — ACETAMINOPHEN 650 MG: 325 TABLET ORAL at 05:06

## 2023-06-04 RX ADMIN — ALISKIREN HEMIFUMARATE 300 MG: 150 TABLET, FILM COATED ORAL at 09:06

## 2023-06-04 RX ADMIN — ACETAMINOPHEN 650 MG: 325 TABLET ORAL at 03:06

## 2023-06-05 LAB
ANION GAP SERPL CALC-SCNC: 8 MMOL/L (ref 8–16)
BASOPHILS # BLD AUTO: 0.04 K/UL (ref 0–0.2)
BASOPHILS NFR BLD: 0.5 % (ref 0–1.9)
BUN SERPL-MCNC: 29 MG/DL (ref 8–23)
CALCIUM SERPL-MCNC: 9 MG/DL (ref 8.7–10.5)
CHLORIDE SERPL-SCNC: 109 MMOL/L (ref 95–110)
CO2 SERPL-SCNC: 23 MMOL/L (ref 23–29)
CREAT SERPL-MCNC: 1 MG/DL (ref 0.5–1.4)
DIFFERENTIAL METHOD: ABNORMAL
EOSINOPHIL # BLD AUTO: 0.2 K/UL (ref 0–0.5)
EOSINOPHIL NFR BLD: 2.6 % (ref 0–8)
ERYTHROCYTE [DISTWIDTH] IN BLOOD BY AUTOMATED COUNT: 13.3 % (ref 11.5–14.5)
EST. GFR  (NO RACE VARIABLE): 53.5 ML/MIN/1.73 M^2
GLUCOSE SERPL-MCNC: 78 MG/DL (ref 70–110)
HCT VFR BLD AUTO: 24.7 % (ref 37–48.5)
HGB BLD-MCNC: 8.1 G/DL (ref 12–16)
IMM GRANULOCYTES # BLD AUTO: 0.04 K/UL (ref 0–0.04)
IMM GRANULOCYTES NFR BLD AUTO: 0.5 % (ref 0–0.5)
LYMPHOCYTES # BLD AUTO: 2.2 K/UL (ref 1–4.8)
LYMPHOCYTES NFR BLD: 27.1 % (ref 18–48)
MAGNESIUM SERPL-MCNC: 1.8 MG/DL (ref 1.6–2.6)
MCH RBC QN AUTO: 33.2 PG (ref 27–31)
MCHC RBC AUTO-ENTMCNC: 32.8 G/DL (ref 32–36)
MCV RBC AUTO: 101 FL (ref 82–98)
MONOCYTES # BLD AUTO: 0.9 K/UL (ref 0.3–1)
MONOCYTES NFR BLD: 10.9 % (ref 4–15)
NEUTROPHILS # BLD AUTO: 4.7 K/UL (ref 1.8–7.7)
NEUTROPHILS NFR BLD: 58.4 % (ref 38–73)
NRBC BLD-RTO: 0 /100 WBC
PHOSPHATE SERPL-MCNC: 4.3 MG/DL (ref 2.7–4.5)
PLATELET # BLD AUTO: 210 K/UL (ref 150–450)
PMV BLD AUTO: 9.2 FL (ref 9.2–12.9)
POTASSIUM SERPL-SCNC: 4.1 MMOL/L (ref 3.5–5.1)
RBC # BLD AUTO: 2.44 M/UL (ref 4–5.4)
SODIUM SERPL-SCNC: 140 MMOL/L (ref 136–145)
WBC # BLD AUTO: 8.11 K/UL (ref 3.9–12.7)

## 2023-06-05 PROCEDURE — 25000242 PHARM REV CODE 250 ALT 637 W/ HCPCS: Performed by: NURSE PRACTITIONER

## 2023-06-05 PROCEDURE — 85025 COMPLETE CBC W/AUTO DIFF WBC: CPT | Performed by: HOSPITALIST

## 2023-06-05 PROCEDURE — 36415 COLL VENOUS BLD VENIPUNCTURE: CPT | Performed by: HOSPITALIST

## 2023-06-05 PROCEDURE — 97110 THERAPEUTIC EXERCISES: CPT

## 2023-06-05 PROCEDURE — 25000003 PHARM REV CODE 250: Performed by: HOSPITALIST

## 2023-06-05 PROCEDURE — 80048 BASIC METABOLIC PNL TOTAL CA: CPT | Performed by: HOSPITALIST

## 2023-06-05 PROCEDURE — 25000003 PHARM REV CODE 250: Performed by: NURSE PRACTITIONER

## 2023-06-05 PROCEDURE — 84100 ASSAY OF PHOSPHORUS: CPT | Performed by: HOSPITALIST

## 2023-06-05 PROCEDURE — 11000004 HC SNF PRIVATE

## 2023-06-05 PROCEDURE — 83735 ASSAY OF MAGNESIUM: CPT | Performed by: HOSPITALIST

## 2023-06-05 PROCEDURE — 97530 THERAPEUTIC ACTIVITIES: CPT

## 2023-06-05 PROCEDURE — 97116 GAIT TRAINING THERAPY: CPT

## 2023-06-05 RX ORDER — AMLODIPINE BESYLATE 5 MG/1
5 TABLET ORAL DAILY
Qty: 30 TABLET | Refills: 1 | Status: SHIPPED | OUTPATIENT
Start: 2023-06-06 | End: 2023-07-17 | Stop reason: SDUPTHER

## 2023-06-05 RX ORDER — AMOXICILLIN 250 MG
1 CAPSULE ORAL 2 TIMES DAILY PRN
Qty: 30 TABLET | Refills: 1 | Status: SHIPPED | OUTPATIENT
Start: 2023-06-05 | End: 2023-08-29

## 2023-06-05 RX ORDER — OXYCODONE HYDROCHLORIDE 5 MG/1
5 TABLET ORAL EVERY 6 HOURS PRN
Qty: 28 TABLET | Refills: 0 | Status: SHIPPED | OUTPATIENT
Start: 2023-06-05 | End: 2023-06-06 | Stop reason: SDUPTHER

## 2023-06-05 RX ADMIN — ACETAMINOPHEN 650 MG: 325 TABLET ORAL at 05:06

## 2023-06-05 RX ADMIN — METHOCARBAMOL 750 MG: 750 TABLET ORAL at 09:06

## 2023-06-05 RX ADMIN — LABETALOL HYDROCHLORIDE 100 MG: 100 TABLET, FILM COATED ORAL at 09:06

## 2023-06-05 RX ADMIN — ACETAMINOPHEN 650 MG: 325 TABLET ORAL at 01:06

## 2023-06-05 RX ADMIN — PSYLLIUM HUSK 1 PACKET: 3.4 POWDER ORAL at 10:06

## 2023-06-05 RX ADMIN — PRAVASTATIN SODIUM 80 MG: 20 TABLET ORAL at 10:06

## 2023-06-05 RX ADMIN — ASPIRIN 81 MG: 81 TABLET, COATED ORAL at 09:06

## 2023-06-05 RX ADMIN — ALISKIREN HEMIFUMARATE 300 MG: 150 TABLET, FILM COATED ORAL at 10:06

## 2023-06-05 RX ADMIN — CALCIUM CARBONATE (ANTACID) CHEW TAB 500 MG 500 MG: 500 CHEW TAB at 09:06

## 2023-06-05 RX ADMIN — CHOLECALCIFEROL TAB 25 MCG (1000 UNIT) 1000 UNITS: 25 TAB at 10:06

## 2023-06-05 RX ADMIN — LORAZEPAM 0.5 MG: 0.5 TABLET ORAL at 09:06

## 2023-06-05 RX ADMIN — THERA TABS 1 TABLET: TAB at 10:06

## 2023-06-05 RX ADMIN — LABETALOL HYDROCHLORIDE 100 MG: 100 TABLET, FILM COATED ORAL at 10:06

## 2023-06-05 RX ADMIN — FAMOTIDINE 20 MG: 20 TABLET ORAL at 10:06

## 2023-06-05 RX ADMIN — Medication 100 MG: at 10:06

## 2023-06-05 RX ADMIN — ASPIRIN 81 MG: 81 TABLET, COATED ORAL at 10:06

## 2023-06-05 RX ADMIN — SPIRONOLACTONE 25 MG: 25 TABLET, FILM COATED ORAL at 04:06

## 2023-06-05 RX ADMIN — ACETAMINOPHEN 650 MG: 325 TABLET ORAL at 09:06

## 2023-06-05 RX ADMIN — AMLODIPINE BESYLATE 5 MG: 5 TABLET ORAL at 10:06

## 2023-06-05 NOTE — PLAN OF CARE
[facility] Skilled Nursing      HOME HEALTH ORDERS  FACE TO FACE ENCOUNTER    Patient Name: Gerda Lai  YOB: 1933    PCP: Tomas Beltran MD   PCP Address: [address] / [place] [address]  PCP Phone Number: [phone]  PCP Fax: [phone]    Encounter Date: 6/5/23    Admit to Home Health    Diagnoses:  Active Hospital Problems    Diagnosis  POA    *Primary osteoarthritis of right knee [M17.11]  Yes     Moderate to severe DJD with some progression of joint space narrowing at the lateral compartment of the tibiofemoral joint space on the right.  Previously described left suprapatellar effusion is no longer present.  In addition, the previously described calcification above the left patella is also no longer seen.      Status post total right knee replacement [Z96.651]  Not Applicable    Macrocytic anemia [D53.9]  Yes    Carotid arterial disease [I77.9]  Yes     Chronic    Anxiety [F41.9]  Yes     Chronic    CKD (chronic kidney disease), stage III [N18.30]  Yes     Chronic    HLD (hyperlipidemia) [E78.5]  Yes     Chronic    Hypertension, essential [I10]  Yes     Chronic     Note that she has an auscultatory gap.  BP is difficult to control and she has been intolerant to several meds.  BP goal is <150/90        Resolved Hospital Problems   No resolved problems to display.       Follow Up Appointments:  Future Appointments   Date Time Provider Department Center   6/6/2023  2:00 PM Stacy Harris NP [facility] ORTHO [address]   6/26/2023  3:00 PM Bouchra Curtis MD [facility] DERM [place]   7/6/2023  3:20 PM Milton Cobian III, MD [facility] ORTHO [address]   7/18/2023  1:30 PM Andi Cisneros DO [facility] PAINMA [place]   8/15/2023  3:20 PM Milton Cobian III, MD [facility] [address]   9/11/2023  2:45 PM Milton Mandujano MD [facility] DERM [place]   11/2/2023  2:00 PM Tomas Beltran MD [facility] IM [place]       Allergies:  Review of patient's allergies indicates:   Allergen Reactions    Sulfa  (sulfonamide antibiotics) Rash    Clarithromycin Other (See Comments)     Weak, extreme fatigue. dizziness    Flexeril [cyclobenzaprine] Other (See Comments)     Dizziness    Iodine and iodide containing products     Lisinopril Other (See Comments)     Cough and sensation of throat swelling/?angioedema    Losartan Rash    Metoprolol Swelling     Tightness in throat    Spironolactone      Throat tightening    Tramadol Other (See Comments)     Dizzy and weak    Verapamil (bulk) Palpitations    Voltaren [diclofenac sodium] Other (See Comments)     Drops blood pressure       Medications: Review discharge medications with patient and family and provide education.    Current Facility-Administered Medications   Medication Dose Route Frequency Provider Last Rate Last Admin    acetaminophen tablet 650 mg  650 mg Oral Q6H PRN London Sam MD        acetaminophen tablet 650 mg  650 mg Oral Q8H London Sam MD   650 mg at 06/05/23 0546    albuterol inhaler 2 puff  2 puff Inhalation Q6H PRN London Sam MD        aliskiren tablet 300 mg  300 mg Oral Daily London Sam MD   300 mg at 06/05/23 1006    amLODIPine tablet 5 mg  5 mg Oral Daily London Sam MD   5 mg at 06/05/23 1005    aspirin EC tablet 81 mg  81 mg Oral BID London Sam MD   81 mg at 06/05/23 1006    calcium carbonate 200 mg calcium (500 mg) chewable tablet 500 mg  500 mg Oral BID PRN London Sam MD   500 mg at 06/03/23 2127    coenzyme Q10 100 mg  100 mg Oral Daily London Sam MD   100 mg at 06/05/23 1005    famotidine tablet 20 mg  20 mg Oral Daily London Sam MD   20 mg at 06/05/23 1005    labetaloL tablet 100 mg  100 mg Oral Q12H London Sam MD   100 mg at 06/05/23 1005    LORazepam tablet 0.5 mg  0.5 mg Oral Q12H PRN London Sam MD   0.5 mg at 05/30/23 2138    LORazepam tablet 0.5 mg  0.5 mg Oral QHS Leigh Arango NP   0.5 mg at 06/04/23 2110    melatonin tablet 6 mg  6 mg Oral  Nightly PRN London Sam MD        methocarbamoL tablet 750 mg  750 mg Oral TID PRN London Sam MD   750 mg at 05/26/23 2114    multivitamin tablet  1 tablet Oral Daily London Sam MD   1 tablet at 06/05/23 1005    oxyCODONE immediate release tablet 5 mg  5 mg Oral Q6H PRN London Sam MD   5 mg at 06/03/23 0417    oxyCODONE immediate release tablet Tab 10 mg  10 mg Oral Q6H PRN London Sam MD        pravastatin tablet 80 mg  80 mg Oral Daily London Sam MD   80 mg at 06/05/23 1006    psyllium husk (aspartame) 3.4 gram packet 1 packet  1 packet Oral Daily Leigh Arango NP   1 packet at 06/05/23 1006    senna-docusate 8.6-50 mg per tablet 1 tablet  1 tablet Oral BID PRN Leigh Arango NP   1 tablet at 06/04/23 0908    spironolactone tablet 25 mg  25 mg Oral Every Mon, Wed, Fri London Sam MD   25 mg at 06/02/23 1743    vitamin D 1000 units tablet 1,000 Units  1,000 Units Oral Daily London Sam MD   1,000 Units at 06/05/23 1006     Current Discharge Medication List        START taking these medications    Details   amLODIPine (NORVASC) 5 MG tablet Take 1 tablet (5 mg total) by mouth once daily.  Qty: 30 tablet, Refills: 1    Comments: .      psyllium husk, aspartame, (METAMUCIL) 3.4 gram PwPk packet Take 1 packet by mouth once daily.  Qty: 30 packet, Refills: 1      senna-docusate 8.6-50 mg (PERICOLACE) 8.6-50 mg per tablet Take 1 tablet by mouth 2 (two) times daily as needed for Constipation.  Qty: 30 tablet, Refills: 1           CONTINUE these medications which have CHANGED    Details   oxyCODONE (ROXICODONE) 5 MG immediate release tablet Take 1 tablet (5 mg total) by mouth every 6 (six) hours as needed for Pain.  Qty: 28 tablet, Refills: 0    Comments: Quantity prescribed more than 7 day supply? No           CONTINUE these medications which have NOT CHANGED    Details   acetaminophen (TYLENOL) 650 MG TbSR Take 1 tablet (650 mg total) by mouth every  8 (eight) hours.  Qty: 120 tablet, Refills: 0      albuterol (PROVENTIL/VENTOLIN HFA) 90 mcg/actuation inhaler INHALE 1 TO 2 PUFFS BY MOUTH EVERY 6 HOURS AS NEEDED FOR WHEEZE  Qty: 8.5 g, Refills: 5      aliskiren (TEKTURNA) 300 MG Tab TAKE 1 TABLET BY MOUTH EVERY DAY  Qty: 90 tablet, Refills: 2      aspirin (ECOTRIN) 81 MG EC tablet Take 1 tablet (81 mg total) by mouth 2 (two) times daily.  Qty: 60 tablet, Refills: 0      clotrimazole-betamethasone 1-0.05% (LOTRISONE) cream Apply topically once daily. To affected toenail.  Qty: 45 g, Refills: 1    Associated Diagnoses: Dermatophytosis of nail      coenzyme Q10 100 mg capsule Take 100 mg by mouth once daily.      docusate sodium (COLACE) 100 MG capsule Take 1 capsule (100 mg total) by mouth 2 (two) times daily.  Qty: 60 capsule, Refills: 0      glucosamine-chondroitin 500-400 mg tablet Take 1 tablet by mouth once daily.       labetaloL (NORMODYNE) 100 MG tablet Take 100 mg by mouth 2 (two) times daily.      lactase (LACTAID) 3,000 unit tablet Take 1 tablet by mouth 3 (three) times daily as needed.      LORazepam (ATIVAN) 0.5 MG tablet TAKE 1/2 TABLET TO 1 TABLET BY MOUTH EVERY 12 HOURS AS NEEDED FOR ANXIETY  Qty: 30 tablet, Refills: 0    Associated Diagnoses: Hypertension, essential      methocarbamoL (ROBAXIN) 750 MG Tab Take 1 tablet (750 mg total) by mouth 3 (three) times daily as needed.  Qty: 21 tablet, Refills: 0      multivitamin capsule Take 1 capsule by mouth once daily.      omeprazole (PRILOSEC OTC) 20 MG tablet Take 20 mg by mouth once daily.      pravastatin (PRAVACHOL) 80 MG tablet Take 1 tablet (80 mg total) by mouth once daily.  Qty: 90 tablet, Refills: 3    Associated Diagnoses: Bilateral carotid artery stenosis; Aortic atherosclerosis; Hyperlipidemia, unspecified hyperlipidemia type      spironolactone (ALDACTONE) 25 MG tablet Take 1 tablet (25 mg total) by mouth 3 (three) times a week.  Qty: 30 tablet, Refills: 11    Comments: .  Associated  Diagnoses: Hypertension, essential      TURMERIC ORAL Take 500 mg by mouth once daily.       vitamin D (VITAMIN D3) 1000 units Tab Take 1,000 Units by mouth once daily.           STOP taking these medications       cefadroxil (DURICEF) 500 MG Cap Comments:   Reason for Stopping:         felodipine (PLENDIL) 5 MG 24 hr tablet Comments:   Reason for Stopping:         mupirocin (BACTROBAN) 2 % ointment Comments:   Reason for Stopping:         mupirocin (BACTROBAN) 2 % ointment Comments:   Reason for Stopping:         polymyxin B sulf-trimethoprim (POLYTRIM) 10,000 unit- 1 mg/mL Drop Comments:   Reason for Stopping:                 I have seen and examined this patient within the last 30 days. My clinical findings that support the need for the home health skilled services and home bound status are the following:no   Weakness/numbness causing balance and gait disturbance due to Joint Replacement and Surgery making it taxing to leave home.  Requiring assistive device to leave home due to unsteady gait caused by  Joint Replacement and Surgery.     Diet:   regular diet    Labs:  Per  protocol, share results with PCP    Referrals/ Consults  Physical Therapy to evaluate and treat. Evaluate for home safety and equipment needs; Establish/upgrade home exercise program. Perform / instruct on therapeutic exercises, gait training, transfer training, and Range of Motion.  Occupational Therapy to evaluate and treat. Evaluate home environment for safety and equipment needs. Perform/Instruct on transfers, ADL training, ROM, and therapeutic exercises.  Aide to provide assistance with personal care, ADLs, and vital signs.    Activities:   activity as tolerated    Nursing:   Agency to admit patient within 24 hours of hospital discharge unless specified on physician order or at patient request    SN to complete comprehensive assessment including routine vital signs. Instruct on disease process and s/s of complications to report to MD.  Review/verify medication list sent home with the patient at time of discharge  and instruct patient/caregiver as needed. Frequency may be adjusted depending on start of care date.     Skilled nurse to perform up to 3 visits PRN for symptoms related to diagnosis    Notify MD if SBP > 160 or < 90; DBP > 90 or < 50; HR > 120 or < 50; Temp > 101; O2 < 88%    Ok to schedule additional visits based on staff availability and patient request on consecutive days within the home health episode.    When multiple disciplines ordered:    Start of Care occurs on Sunday - Wednesday schedule remaining discipline evaluations as ordered on separate consecutive days following the start of care.    Thursday SOC -schedule subsequent evaluations Friday and Monday the following week.     Friday - Saturday SOC - schedule subsequent discipline evaluations on consecutive days starting Monday of the following week.    For all post-discharge communication and subsequent orders please contact patient's primary care physician. If unable to reach primary care physician or do not receive response within 30 minutes, please contact [facility] for clinical staff order clarification    Miscellaneous   N/A no DME is requested    Home Health Aide:  Physical Therapy , Occupational Therapy  and Home Health Aide     Wound Care Orders  Keep right knee dressing in place until seen at ortho appt    I certify that this patient is confined to her home and needs physical therapy and occupational therapy.    MARIA R BENITEZ, APRN  6/5/2023

## 2023-06-05 NOTE — PROGRESS NOTES
[facility] - Skilled Nursing  Adult Nutrition  Progress Note    SUMMARY   Recommendations  Continue  regular diet, encourage intake,educateg on protein needs for healing,RD following  Goals: PO to meet 85% of EEN/EPN by next RD follow up  Nutrition Goal Status: progressing towards goal  Communication of RD Recs: other (comment) (POC)    Assessment and Plan     Increased nutrient needs (protein) related to wound healing as evidenced by s/p TKR     New     Plan     Nutrition education-protein needs 6/5  Collaboration with other providers  General diet  Multivitamin/mineral supplement therapy MVI  Mineral supplement therapy- Vit D          Malnutrition Assessment 5/29     Skin (Micronutrient): wounds unhealed  Eyes (Micronutrient): conjunctiva dull  Neck/Chest (Micronutrient): muscle wasting  Musculoskeletal/Lower Extremities: muscle wasting   Micronutrient Evaluation Summary: no deficiencies   Muscle Mass (Malnutrition): mild depletion   Orbital Region (Subcutaneous Fat Loss): mild depletion  Upper Arm Region (Subcutaneous Fat Loss): well nourished  Thoracic and Lumbar Region: well nourished   [place] Region (Muscle Loss): mild depletion  Clavicle Bone Region (Muscle Loss): well nourished  Clavicle and Acromion Bone Region (Muscle Loss): mild depletion  Dorsal Hand (Muscle Loss): moderate depletion  Anterior Thigh Region (Muscle Loss): mild depletion  Posterior Calf Region (Muscle Loss): mild depletion                 Reason for Assessment    Reason For Assessment: RD follow-up  Diagnosis:  (OA of R knee, s/p TKR)  Relevant Medical History: HTN, HLD, CKD 3, CAD, OA, Anxiety, frailty  Interdisciplinary Rounds: attended  General Information Comments: Patient was instructed on high protein diet for discharge diet for 3-4 weeks her protein goal may be challenging as she is a small eater. Po here 75%  Nutrition Discharge Planning: DC on general diet with 70 gm protein per day for 3-4 weeks  Nutrition/Diet  "History    Patient Reported Diet/Restrictions/Preferences: general  Typical Food/Fluid Intake: 2 meals plus 1/2 sandwich at lunch  Food Preferences: no canned fruit send fresh fruit  Spiritual, Cultural Beliefs, Druze Practices, Values that Affect Care: no  Food Allergies: NKFA  Factors Affecting Nutritional Intake: None identified at this time    Anthropometrics    Temp: 98.5 °F (36.9 °C)  Height Method: Stated  Height: 5' 3" (160 cm)  Height (inches): 63 in  Weight Method: Standard Scale  Weight: 64.6 kg (142 lb 6.7 oz)  Weight (lb): 142.42 lb  Ideal Body Weight (IBW), Female: 115 lb  % Ideal Body Weight, Female (lb): 119.63 %  BMI (Calculated): 25.2  BMI Grade: 18.5-24.9 - normal  Usual Body Weight (UBW), k kg  % Usual Body Weight: 100.86  % Weight Change From Usual Weight: 0.64 %       Lab/Procedures/Meds    Pertinent Labs Reviewed: reviewed  Pertinent Labs Comments: BUN 29, Hg 8.1, Hct 24.7, GFR 53.5  Pertinent Medications Reviewed: reviewed  Pertinent Medications Comments: spironolactone, senna-docusate, ASA, Vit D, MVI, statin, famotidine, CoQ10    Estimated/Assessed Needs    Weight Used For Calorie Calculations: 62.4 kg (137 lb 9.1 oz)  Energy Calorie Requirements (kcal): 1404  Energy Need Method: Bollinger-St Jeor (x 1.4(PAL))  Protein Requirements: 75  Weight Used For Protein Calculations: 62.4 kg (137 lb 9.1 oz) (x 1.2 g/kg)  Fluid Requirements (mL): 1401 or per MD  Estimated Fluid Requirement Method: RDA Method  RDA Method (mL): 1404  CHO Requirement: -    Nutrition Prescription Ordered  Current Diet Order: Regular  Nutrition Order Comments: PO 70%, snacking  Oral Nutrition Supplement: declines    Evaluation of Received Nutrient/Fluid Intake  I/O: no data  Energy Calories Required: meeting needs  Protein Required: meeting needs  Fluid Required: meeting needs  Comments: LBM 6/3  Tolerance: tolerating  % Intake of Estimated Energy Needs: 75 - 100 %  % Meal Intake: 75 - 100 %    Nutrition " Risk    Level of Risk/Frequency of Follow-up: low (one time per week)     Monitor and Evaluation    Food and Nutrient Intake: food and beverage intake  Food and Nutrient Adminstration: diet order  Knowledge/Beliefs/Attitudes: food and nutrition knowledge/skill  Anthropometric Measurements: weight change  Biochemical Data, Medical Tests and Procedures: gastrointestinal profile, electrolyte and renal panel  Nutrition-Focused Physical Findings: overall appearance, skin     Nutrition Follow-Up    RD Follow-up?: Yes

## 2023-06-05 NOTE — PLAN OF CARE
Problem: Occupational Therapy  Goal: Occupational Therapy Goal  Description: Goals to be met by: 06/27/23     Patient will increase functional independence with ADLs by performing:    UE Dressing with Modified [place]. - Met  LE Dressing with Modified [place]. - Met  Grooming while standing at sink with Modified [place]. - Met  Toileting from toilet with Modified [place] for hygiene and clothing management. - Met  Bathing from  sitting at sink with Modified [place]. - Not Met  Toilet transfer to toilet with Modified [place]. - Met  Upper extremity exercise program 3x15 reps per handout, with independence. - Met    Outcome: Adequate for Care Transition     Pt is scheduled to d/c from this SNF tomorrow on 6/6/2023 and is appropriate for continued HHOT.

## 2023-06-05 NOTE — PT/OT/SLP PROGRESS
"Physical Therapy Treatment/Discharge Note    Patient Name:  Gerda Lai   MRN:  [id number]  Admit Date: 5/26/2023  Admitting Diagnosis: Primary osteoarthritis of right knee  Recent Surgeries: ARTHROPLASTY, KNEE, TOTAL, USING COMPUTER-ASSISTED NAVIGATION: QUYEN: RIGHT: MARTHA - TRIATHALON (Right)    General Precautions: Standard, fall  Orthopedic Precautions: RLE weight bearing as tolerated  Braces: N/A    Recommendations:     Discharge Recommendations: home health PT  Level of Assistance Recommended at Discharge: Intermittent assistance   Discharge Equipment Recommendations: none  Barriers to discharge: Decreased caregiver support    Assessment:     Gerda Lai is a 89 y.o. female admitted with a medical diagnosis of Primary osteoarthritis of right knee. Patient agreeable to PT treatment this AM. Patient with achievement of all mobility goals set per POC. Patient currently able to transfer with Mod I using RW, ambulate with Supervision using RW, ascend/descend 4 inch curb step with Supervision and 6 inch/8 inch curb step with SBA using RW, complete bed mobility with Mod I to [place] and ascend/descend steps using BHR with Supervision. Patient scheduled for D/C on 6/6/23 with recommendation for Home Health PT/OT to continue with progression of mobility toward PLOF and increased functional independence.       Performance deficits affecting function: weakness, impaired endurance, gait instability, impaired balance, decreased lower extremity function, pain, decreased ROM, edema, orthopedic precautions.    Rehab Potential is good    Activity Tolerance: Good    Plan:     Patient to be seen 5 x/week to address the above listed problems via gait training, therapeutic activities, therapeutic exercises, neuromuscular re-education, wheelchair management/training    Plan of Care Expires: 06/26/23  Plan of Care Reviewed with: patient    Subjective     "I am ready to get back home. Thank you for your help." "     Pain/Comfort:  Pain Rating 1: 2/10  Location - Side 1: Right  Location - Orientation 1: generalized  Location 1: knee  Pain Addressed 1: Reposition, Distraction, Cessation of Activity  Pain Rating Post-Intervention 1: 2/10    Patient's cultural, spiritual, Hindu conflicts given the current situation:  no    Objective:     Communicated with nursing staff prior to session.  Patient found  seated in recliner chair  with  (no active lines) upon PT entry to room.     Therapeutic Activities and Exercises:   LE exercise program to complete at home discussed with return patient demonstration; brochure provided:  Patient completed supine exercises with RLE x 10 reps each including ankle DF/PF, Heel Slides, Hip Abduction/Adduction, Quad Sets, Glute Sets, SAQs and SLR.   Patient completed seated exercises with RLE x 10 reps each including ankle DF/PF, LAQs, Hip Flexion and Hip Adduction.     LE ergometer x 10 minutes at moderate resistance for LE strengthening, ROM, and endurance; no rest break required.     Functional Mobility:     06/05/23 1130   Roll Left and Right   Assistance Needed Independent   Physical Assistance Level No physical assistance   Comment Independent on level mat without railings.   CARE Score - Roll Left and Right 6   Sit to Lying   Assistance Needed Independent   Physical Assistance Level No physical assistance   Comment Mod I on level mat without railings.   CARE Score - Sit to Lying 6   Lying to Sitting on Side of Bed   Assistance Needed Independent   Physical Assistance Level No physical assistance   Comment Mod I on level mat without railings.   CARE Score - Lying to Sitting on Side of Bed 6   Sit to Stand   Assistance Needed Independent   Physical Assistance Level No physical assistance   Comment Mod I using RW   CARE Score - Sit to Stand 6   Chair/Bed-to-Chair Transfer   Assistance Needed Independent   Physical Assistance Level No physical assistance   Comment Mod I using RW   CARE Score -  Chair/Bed-to-Chair Transfer 6   Car Transfer   Reason if not Attempted Environmental limitations   CARE Score - Car Transfer 10   Walk 10 Feet   Assistance Needed Set-up / clean-up   Physical Assistance Level No physical assistance   Comment Patient ambulated 210 feet using RW with Supervision. Good stride length and knee extension on RLE throughout trial. No LOB.   CARE Score - Walk 10 Feet 5   Walk 50 Feet with Two Turns   Assistance Needed Set-up / clean-up   Physical Assistance Level No physical assistance   Comment Patient ambulated 210 feet using RW with Supervision. Good stride length and knee extension on RLE throughout trial. No LOB.   CARE Score - Walk 50 Feet with Two Turns 5   Walk 150 Feet   Assistance Needed Set-up / clean-up   Physical Assistance Level No physical assistance   Comment Patient ambulated 210 feet using RW with Supervision. Good stride length and knee extension on RLE throughout trial. No LOB.   CARE Score - Walk 150 Feet 5   Walking 10 Feet on Uneven Surfaces   Assistance Needed Supervision   Physical Assistance Level No physical assistance   Comment SBA on unlevel mat using RW.   CARE Score - Walking 10 Feet on Uneven Surfaces 4   1 Step (Curb)   Assistance Needed Set-up / clean-up   Physical Assistance Level No physical assistance   Comment Supervision to ascend/descend 4 inch curb step using RW. Patient also ascended/descended 8 inch curb step using RW with SBA.   CARE Score - 1 Step (Curb) 5   4 Steps   Assistance Needed Set-up / clean-up   Physical Assistance Level No physical assistance   Comment Patient ascended/descended 4 steps using B HR with Supervision.   CARE Score - 4 Steps 5   12 Steps   Reason if not Attempted Safety concerns   CARE Score - 12 Steps 88   Picking Up Object   Assistance Needed Set-up / clean-up   Physical Assistance Level No physical assistance   Comment Supervision to reach for object on floor using RW and reacher.   CARE Score - Picking Up Object 5    Uses a Wheelchair/Scooter?   Uses a Wheelchair/Scooter? Yes   Wheel 50 Feet with Two Turns   Assistance Needed Set-up / clean-up   Physical Assistance Level No physical assistance   Comment Patient propelled W/C approximately 140 feet using BUE with Supervision per OT tx.   CARE Score - Wheel 50 Feet with Two Turns 5   Type of Wheelchair/Scooter Manual   Wheel 150 Feet   Comment Limited distance secondary to RUE fatigue per OT.   Reason if not Attempted Safety concerns   CARE Score - Wheel 150 Feet 88   Type of Wheelchair/Scooter Manual       AM-PAC 6 CLICK MOBILITY  22    Patient left up in chair with  OT present.    GOALS:   Multidisciplinary Problems       Physical Therapy Goals          Problem: Physical Therapy    Goal Priority Disciplines Outcome Goal Variances Interventions   Physical Therapy Goal     PT, PT/OT Adequate for Care Transition     Description: Goals to be met by: 23     Patient will increase functional independence with mobility by performin. Supine to sit with Modified [place] - MET  2. Sit to supine with Modified [place] - MET  3. Rolling to Left and Right with [place] - MET  4. Sit to stand transfer with Modified [place] - MET  5. Bed to chair transfer with Modified [place] using Rolling Walker - MET  6. Gait  x 150 feet with Supervision using Rolling Walker - MET  7. Wheelchair propulsion x 50 feet with Supervision using bilateral upper extremities - MET  8. Ascend/Descend 6 inch/8 inch curb step with Stand-by Assistance using Rolling Walker - 6 inch/8 inch - MET  9. Lower extremity exercise program 3 x 10 reps per handout, with assistance as needed - MET                         Time Tracking:     PT Received On: 23  PT Start Time: 1009  PT Stop Time: 1048  PT Total Time (min): 39 min    Billable Minutes: Gait Training 10, Therapeutic Activity 9, and Therapeutic Exercise 20    Treatment Type: Treatment  PT/PTA: PT     Number of PTA visits since  last PT visit: 0     06/05/2023

## 2023-06-05 NOTE — PT/OT/SLP PROGRESS
"Occupational Therapy   Treatment/Discharge Summary    Name: Gerda Lai  MRN: [id number]  Admit Date: 5/26/2023  Admitting Diagnosis:  Primary osteoarthritis of right knee    General Precautions: Standard, fall   Orthopedic Precautions: RLE weight bearing as tolerated   Braces: N/A    Recommendations:     Discharge Recommendations:  home health OT  Level of Assistance Recommended at Discharge: Intermittent supervision  Discharge Equipment Recommendations: none  Barriers to discharge:  Decreased caregiver support    Assessment:     Gerda Lai is a 89 y.o. female with a medical diagnosis of Primary osteoarthritis of right knee.  Pt tolerated session well and without incident.  She is scheduled to d/c from this SNF tomorrow on 6/6/2023 and is appropriate for continued HHOT.  She presents with the following. Performance deficits affecting function are impaired endurance, pain, decreased upper extremity function, decreased lower extremity function, decreased ROM, gait instability, impaired balance, impaired functional mobility, impaired self care skills, orthopedic precautions.     Rehab Potential is good    Activity tolerance:  Good    Plan:     Pt is scheduled to d/c from this SNF tomorrow on 6/6/2023 and is appropriate for continued HHOT.    Plan of Care Expires: 06/26/23  Plan of Care Reviewed with: patient    Subjective   "I've been chaparrita the pain hasn't been bad."  Communicated with: nursing prior to session.    Pain/Comfort:  Pain Rating 1: other (see comments) (not rated)  Location - Side 1: Right  Location - Orientation 1: generalized  Location 1: knee  Pain Addressed 1: Other (see comments) (ice pack applied after session)  Pain Rating Post-Intervention 1: 1/10    Patient's cultural, spiritual, Uatsdin conflicts given the current situation:  no    Objective:     Patient found up in chair in the rehab gym with PT present having completed PT session with Other (comments) (no lines attached) upon OT entry " to room.    Bed Mobility:    N/A due to pt sitting in w/c at beginning of session and in bedside chair at end of session    Functional Mobility/Transfers:  Patient completed Sit <> Stand Transfer from w/c x 1 trial with supervision  with  no assistive device   Patient completed Wheelchair <> Bedside Chair Transfer using Stand Pivot technique with supervision with no assistive device  Functional Mobility: Pt propelled w/c ~140 ft in the hallway to her room with supervision.     Activities of Daily Living:  Pt already performed.  Writing therapist observed pt eating her breakfast while seated EOB independently.  PT observed pt performing oral care independently while standing at the bathroom sink.  Pt reported dressing herself and toileting independently.     [facility] 6 Click ADL: 23    OT Exercises: UE ergometer performed for 10 min on minimal resistance to increase functional endurance and strength in order increase independence when performing self care tasks, functional ambulation, W/C propulsion, and functional standing activities.     Treatment & Education:  Pt edu on role of OT, POC, safety when performing self care tasks, benefit of performing OOB activity, and safety when performing functional transfers and mobility.    - Self care tasks completed-- as noted above      Patient left up in chair with call button in reach, nursing notified, and nursing present    GOALS:   Multidisciplinary Problems       Occupational Therapy Goals          Problem: Occupational Therapy    Goal Priority Disciplines Outcome Interventions   Occupational Therapy Goal     OT, PT/OT Adequate for Care Transition    Description: Goals to be met by: 06/27/23     Patient will increase functional independence with ADLs by performing:    UE Dressing with Modified [place]. - Met  LE Dressing with Modified [place]. - Met  Grooming while standing at sink with Modified [place]. - Met  Toileting from toilet with Modified [place]  for hygiene and clothing management. - Met  Bathing from  sitting at sink with Modified [place]. - Not Met  Toilet transfer to toilet with Modified [place]. - Met  Upper extremity exercise program 3x15 reps per handout, with independence. - Met                         Time Tracking:     OT Date of Treatment: 06/05/23  OT Start Time: 1049    OT Stop Time: 1117  OT Total Time (min): 28 min    Billable Minutes:Therapeutic Activity 18 min  Therapeutic Exercise 10 min    6/5/2023

## 2023-06-05 NOTE — PROGRESS NOTES
[facility]                                  Skilled Nursing Facility                   Progress Note     Admit Date: 5/26/2023  LEONARDO 6/6/2023  Principal Problem:  Primary osteoarthritis of right knee   HPI obtained from patient interview and chart review     Chief Complaint: Re-evaluation of medical treatment and therapy status, lab review, discharge orders    HPI:   Gerda Lai is a 89 year old female PMHx of CKD stage 3, anxiety, arthritis, HLD, HTN, and Right knee pain who presents to SNF following hospitalization for primary osteoarthritis of right knee s/p right total knee arthroplasty on 05/22/2023 with Dr. Cobian.  Admission to SNF for secondary weakness and debility.    Interval history:   24 hr vital sign ranges listed below. Labs reviewed as below. Patient states pain is well controlled at this time.  Patient denies shortness of breath, abdominal discomfort, nausea, or vomiting.  Patient reports an adequate appetite.  Patient denies dysuria.  Patient reports having regular bowel movements.  Patient progessing with PT/OT- ambulated ~150 ft with stand by assistance and rolling walker. Continuing to follow and treat all acute and chronic conditions. Discharge orders placed.    Past Medical History: Patient has a past medical history of Anxiety, Arthritis, Basal cell carcinoma (09/2016), Basal cell carcinoma of right forearm (04/19/2023), Breast cancer (1992), Cataract, Fibromyalgia, History of measles as a child, Hyperlipidemia, Hypertension, Personal history of colonic polyps, Pneumonia, SCC (squamous cell carcinoma) (2015), SCC (squamous cell carcinoma) (2016), SCC (squamous cell carcinoma) (2017), SCC (squamous cell carcinoma) (2022), Shingles, Squamous cell carcinoma (2015), Thyroid disease, and Vaginitis.    Past Surgical History: Patient has a past surgical history that includes thyriodectomy; Total hip arthroplasty;  tonsillectomy; Adenoidectomy; Cataract extraction w/  intraocular lens implant (Bilateral); Yag  (Left); Breast lumpectomy (Right, 1992); Breast biopsy (Left); Eye surgery; Tonsillectomy; Joint replacement (Right); Knee Arthroplasty (Left, 8/10/2020); Carpal tunnel release (Right, 3/16/2021); Hand surgery; Injection of anesthetic agent around medial branch nerves innervating lumbar facet joint (Left, 11/18/2021); Injection of anesthetic agent around medial branch nerves innervating cervical facet joint (N/A, 1/4/2022); Radiofrequency thermal coagulation of medial branch of posterior ramus of cervical spinal nerve (Left, 3/8/2022); Epidural steroid injection (N/A, 2/24/2023); and arthroplasty, knee, total, using computer-assisted navigation (Right, 5/22/2023).    Social History: Patient reports that she has never smoked. She has never been exposed to tobacco smoke. She has never used smokeless tobacco. She reports current alcohol use. She reports that she does not use drugs.    Family History: family history includes Breast cancer in her mother; Cancer in her mother and paternal aunt; Glaucoma in her paternal grandmother; Heart disease in her father and paternal aunt; Stroke in her paternal grandfather.    Allergies: Patient is allergic to sulfa (sulfonamide antibiotics), clarithromycin, flexeril [cyclobenzaprine], iodine and iodide containing products, lisinopril, losartan, metoprolol, spironolactone, tramadol, verapamil (bulk), and voltaren [diclofenac sodium].    ROS  Constitutional: Negative for fever   Eyes: Negative for blurred vision, double vision   Respiratory: Negative for cough, shortness of breath   Cardiovascular: Negative for chest pain, palpitations, and leg swelling.   Gastrointestinal: Negative for abdominal pain, constipation, diarrhea, nausea, vomiting.   Genitourinary: Negative for dysuria, frequency   Musculoskeletal:  + generalized weakness.  +right knee pain  Skin: Negative for itching and  rash.   Neurological: Negative for dizziness, headaches.   Psychiatric/Behavioral: Negative for depression. The patient is not nervous/anxious.      24 hour Vital Sign Range   Temp:  [97.3 °F (36.3 °C)-98.5 °F (36.9 °C)]   Pulse:  [63-80]   Resp:  [18]   BP: (167-170)/(60-73)   SpO2:  [98 %]     Current BMI: Body mass index is 25.23 kg/m².    PEx  Constitutional: Patient appears debilitated.  No distress noted  HENT:   Head: Normocephalic and atraumatic.   Eyes: Pupils are equal, round  Neck: Normal range of motion. Neck supple.   Cardiovascular: Normal rate, regular rhythm and normal heart sounds.    Pulmonary/Chest: Effort normal and breath sounds are clear  Abdominal: Soft. Bowel sounds are normal.   Musculoskeletal: Normal range of motion.   Neurological: Alert and oriented to person, place, and time.   Psychiatric: Normal mood and affect. Behavior is normal.   Skin: Skin is warm and dry.  Surgical dressing to right knee, only to be removed by Ortho.    Recent Labs   Lab 06/05/23  0542      K 4.1      CO2 23   BUN 29*   CREATININE 1.0   MG 1.8         Recent Labs   Lab 06/05/23  0542   WBC 8.11   RBC 2.44*   HGB 8.1*   HCT 24.7*      *   MCH 33.2*   MCHC 32.8         No results for input(s): POCTGLUCOSE in the last 168 hours.     Assessment and Plan:    Acute postoperative pain  - stable, continue acetaminophen 650 mg q.8 hours, oxycodone 5 or 10 mg q.6 hours PRN, methocarbamol 750 mg TID prn    CKD stage 3  - stable, continue monitor twice weekly BMPs, avoid nephrotoxic agents, renally dose medications when appropriate    HTN  - stable, continue amlodipine 5 mg daily (in place of home non formulary felodipine 5 mg daily), labetalol 100 mg BID, Aldactone 25 mg  -patient agreeable to keeping amlodipine at discharge will follow up with PCP, is on blood pressure program through [facility] virtual    Primary osteoarthritis of right knee  S/p right TKA on 05/22  - PT/OT, WBAT  - DVT PPX with ASA 81  mg BID times 30 days  - postop prophylactic antibiotics, cefadroxil 500 mg BID x7 days  - ortho JOE to see pt weekly at [facility]  - do not remove dressing at [facility]- will be done after DC or by Ortho  - follow-up with orthopedics after discharge tomorrow     HLD  - continue pravastatin 80 mg daily    GERD  - continue famotidine 20 mg daily    Debility   - Continue with PT/OT for gait training and strengthening and restoration of ADL's   - Encourage mobility, OOB in chair, and early ambulation as appropriate  - Fall precautions   - Monitor for bowel and bladder dysfunction  - Monitor for and prevent skin breakdown and pressure ulcers  - Continue DVT prophylaxis with ASA 81 mg BID, frequent ambulation      Anticipate disposition:  Home with home health tomorrow      Follow-up needed during SNF stay-orthopedics (6/6) unless otherwise specified, derm 6/26 if still at [facility]    Follow-up needed after discharge from [facility]: PCP, Orthopedics    Future Appointments   Date Time Provider Department Center   6/6/2023  2:00 PM Stacy Harris NP [facility] ORTHO [address]   6/26/2023  3:00 PM Bouchra Curtis MD [facility] DERM [place]   7/6/2023  3:20 PM Milton Cobian III, MD [facility] ORTHO [address]   7/18/2023  1:30 PM Andi Cisneros,  [facility] PAINMA [place]   8/15/2023  3:20 PM Milton Cobian III, MD [facility] ORTHO [address]   9/11/2023  2:45 PM Milton Mandujano MD [facility] DERM [place]   11/2/2023  2:00 PM Tomas Beltran MD [facility] IM [place]       [facility]  Department of Hospital Medicine   [facility]     DOS: 6/5/2023   Extended Visit  Total time spent: 100 minutes  Description of Time: counseling patient on clinical condition, therapies provided, plan of care, emotional support, coordinating patient care with other care team members, reviewing and interpreting labs and imaging, collaboration with physician, initiating new orders, chart review, and documentation. See interval hx.       Patient  note was created using MModal Dictation.  Any errors in syntax or even information may not have been identified and edited on initial review prior to signing this note.

## 2023-06-05 NOTE — PLAN OF CARE
Patient will discharge tomorrow 6/6/23, via personal car with her niece. Patient will discharge around 1pm and then will have niece transport her to the appointment at [facility]. No DME needed. Awaiting which [facility] company will accept patient.

## 2023-06-05 NOTE — PLAN OF CARE
Goals achieved. D/C on 23.    Problem: Physical Therapy  Goal: Physical Therapy Goal  Description: Goals to be met by: 23     Patient will increase functional independence with mobility by performin. Supine to sit with Modified Terrell - MET  2. Sit to supine with Modified [place] - MET  3. Rolling to Left and Right with Terrell - MET  4. Sit to stand transfer with Modified [place] - MET  5. Bed to chair transfer with Modified Terrell using Rolling Walker - MET  6. Gait  x 150 feet with Supervision using Rolling Walker - MET  7. Wheelchair propulsion x 50 feet with Supervision using bilateral upper extremities - MET  8. Ascend/Descend 6 inch/8 inch curb step with Stand-by Assistance using Rolling Walker - 6 inch/8 inch - MET  9. Lower extremity exercise program 3 x 10 reps per handout, with assistance as needed - MET    Outcome: Adequate for Care Transition   2023

## 2023-06-06 ENCOUNTER — OFFICE VISIT (OUTPATIENT)
Dept: ORTHOPEDICS | Facility: CLINIC | Age: 88
End: 2023-06-06
Payer: MEDICARE

## 2023-06-06 VITALS
SYSTOLIC BLOOD PRESSURE: 133 MMHG | BODY MASS INDEX: 25.24 KG/M2 | HEIGHT: 63 IN | RESPIRATION RATE: 16 BRPM | HEART RATE: 77 BPM | DIASTOLIC BLOOD PRESSURE: 63 MMHG | OXYGEN SATURATION: 96 % | TEMPERATURE: 98 F | WEIGHT: 142.44 LBS

## 2023-06-06 VITALS — BODY MASS INDEX: 25.12 KG/M2 | WEIGHT: 141.75 LBS | HEIGHT: 63 IN

## 2023-06-06 DIAGNOSIS — Z96.651 STATUS POST RIGHT KNEE REPLACEMENT: Primary | ICD-10-CM

## 2023-06-06 PROCEDURE — 99024 PR POST-OP FOLLOW-UP VISIT: ICD-10-PCS | Mod: POP,,, | Performed by: NURSE PRACTITIONER

## 2023-06-06 PROCEDURE — 99999 PR PBB SHADOW E&M-EST. PATIENT-LVL IV: ICD-10-PCS | Mod: PBBFAC,,, | Performed by: NURSE PRACTITIONER

## 2023-06-06 PROCEDURE — 99214 OFFICE O/P EST MOD 30 MIN: CPT | Mod: PBBFAC | Performed by: NURSE PRACTITIONER

## 2023-06-06 PROCEDURE — 99999 PR PBB SHADOW E&M-EST. PATIENT-LVL IV: CPT | Mod: PBBFAC,,, | Performed by: NURSE PRACTITIONER

## 2023-06-06 PROCEDURE — 25000242 PHARM REV CODE 250 ALT 637 W/ HCPCS: Performed by: NURSE PRACTITIONER

## 2023-06-06 PROCEDURE — 99024 POSTOP FOLLOW-UP VISIT: CPT | Mod: POP,,, | Performed by: NURSE PRACTITIONER

## 2023-06-06 PROCEDURE — 25000003 PHARM REV CODE 250: Performed by: HOSPITALIST

## 2023-06-06 RX ORDER — OXYCODONE HYDROCHLORIDE 5 MG/1
5 TABLET ORAL EVERY 6 HOURS PRN
Qty: 28 TABLET | Refills: 0 | Status: SHIPPED | OUTPATIENT
Start: 2023-06-06 | End: 2023-06-13

## 2023-06-06 RX ADMIN — ACETAMINOPHEN 650 MG: 325 TABLET ORAL at 01:06

## 2023-06-06 RX ADMIN — ASPIRIN 81 MG: 81 TABLET, COATED ORAL at 09:06

## 2023-06-06 RX ADMIN — ALISKIREN HEMIFUMARATE 300 MG: 150 TABLET, FILM COATED ORAL at 09:06

## 2023-06-06 RX ADMIN — LABETALOL HYDROCHLORIDE 100 MG: 100 TABLET, FILM COATED ORAL at 09:06

## 2023-06-06 RX ADMIN — Medication 100 MG: at 09:06

## 2023-06-06 RX ADMIN — ACETAMINOPHEN 650 MG: 325 TABLET ORAL at 06:06

## 2023-06-06 RX ADMIN — THERA TABS 1 TABLET: TAB at 09:06

## 2023-06-06 RX ADMIN — FAMOTIDINE 20 MG: 20 TABLET ORAL at 09:06

## 2023-06-06 RX ADMIN — AMLODIPINE BESYLATE 5 MG: 5 TABLET ORAL at 09:06

## 2023-06-06 RX ADMIN — PSYLLIUM HUSK 1 PACKET: 3.4 POWDER ORAL at 09:06

## 2023-06-06 RX ADMIN — PRAVASTATIN SODIUM 80 MG: 20 TABLET ORAL at 09:06

## 2023-06-06 RX ADMIN — CHOLECALCIFEROL TAB 25 MCG (1000 UNIT) 1000 UNITS: 25 TAB at 09:06

## 2023-06-06 NOTE — DISCHARGE SUMMARY
[facility]   Skilled Nursing Facility   Discharge Summary  Patient Name: Gerda Lai  : 1933  MRN: [id number]  Attending Physician: No att. providers found  Nurse Practitioner: [facility]    Admit Date: 2023   Date of Discharge:  2023  Length of Stay:  LOS: 11 days     Date of Service: 2023    Principal Problem: Primary osteoarthritis of right knee    HPI  Gerda Lai is a 89 year old female PMHx of CKD stage 3, anxiety, arthritis, HLD, HTN, and Right knee pain who presents to SNF following hospitalization for primary osteoarthritis of right knee s/p right total knee arthroplasty on 2023 with Dr. Cobian.  Admission to SNF for secondary weakness and debility.    Hospital Course  Patient progressed well with PT and OT- last PT note states that patient ambulated ambulated ~150 ft with stand by assistance and rolling walker.  Patient had no significant events during their stay at SNF. Home health was set up. DME not needed Follow up appointment to be made by patient within one week. All prescriptions and discharge instructions were ordered to be given to the patient prior to discharge.     Physical Exam  Constitutional: Patient appears debilitated.  No distress noted  HENT:   Head: Normocephalic and atraumatic.   Eyes: Pupils are equal, round  Neck: Normal range of motion. Neck supple.   Cardiovascular: Normal rate, regular rhythm and normal heart sounds.    Pulmonary/Chest: Effort normal and breath sounds are clear  Abdominal: Soft. Bowel sounds are normal.   Musculoskeletal: Normal range of motion.   Neurological: Alert and oriented to person, place, and time.   Psychiatric: Normal mood and affect. Behavior is normal.   Skin: Skin is warm and dry.  Surgical dressing to right knee, only to be removed by Ortho.       Recent Labs   Lab 23  0542   WBC 7.44 8.11   HGB 8.1* 8.1*   HCT 25.4* 24.7*    210     Recent Labs   Lab 23  06/05/23  0542    140   K 4.2 4.1    109   CO2 24 23   BUN 29* 29*   CREATININE 0.9 1.0   GLU 90 78   CALCIUM 8.8 9.0   MG 1.9 1.8   PHOS 4.3 4.3     No results for input(s): ALKPHOS, ALT, AST, ALBUMIN, PROT, BILITOT, INR in the last 168 hours.   No results for input(s): CPK, CPKMB, MB, TROPONINI in the last 72 hours.  No results for input(s): LACTATE in the last 72 hours.    No results for input(s): POCTGLUCOSE in the last 168 hours.   Hemoglobin A1C   Date Value Ref Range Status   05/27/2011 5.5 4.0 - 6.2 % Final         Discharge Medication List as of 6/6/2023 12:10 PM        START taking these medications    Details   amLODIPine (NORVASC) 5 MG tablet Take 1 tablet (5 mg total) by mouth once daily., Starting Tue 6/6/2023, Until Wed 6/5/2024, Normal      psyllium husk, aspartame, (METAMUCIL) 3.4 gram PwPk packet Take 1 packet by mouth once daily., Starting Tue 6/6/2023, Normal      senna-docusate 8.6-50 mg (PERICOLACE) 8.6-50 mg per tablet Take 1 tablet by mouth 2 (two) times daily as needed for Constipation., Starting Mon 6/5/2023, Normal           CONTINUE these medications which have CHANGED    Details   oxyCODONE (ROXICODONE) 5 MG immediate release tablet Take 1 tablet (5 mg total) by mouth every 6 (six) hours as needed for Pain., Starting Tue 6/6/2023, Until Tue 6/13/2023 at 2359, Normal           CONTINUE these medications which have NOT CHANGED    Details   acetaminophen (TYLENOL) 650 MG TbSR Take 1 tablet (650 mg total) by mouth every 8 (eight) hours., Starting Sat 5/20/2023, Normal      albuterol (PROVENTIL/VENTOLIN HFA) 90 mcg/actuation inhaler INHALE 1 TO 2 PUFFS BY MOUTH EVERY 6 HOURS AS NEEDED FOR WHEEZE, Normal      aliskiren (TEKTURNA) 300 MG Tab TAKE 1 TABLET BY MOUTH EVERY DAY, Normal      aspirin (ECOTRIN) 81 MG EC tablet Take 1 tablet (81 mg total) by mouth 2 (two) times daily., Starting Sat 5/20/2023, Until Mon 6/19/2023, Normal      clotrimazole-betamethasone 1-0.05% (LOTRISONE)  cream Apply topically once daily. To affected toenail., Starting Wed 4/26/2023, Normal      coenzyme Q10 100 mg capsule Take 100 mg by mouth once daily., Historical Med      docusate sodium (COLACE) 100 MG capsule Take 1 capsule (100 mg total) by mouth 2 (two) times daily., Starting Sat 5/20/2023, Until Mon 6/19/2023, Normal      glucosamine-chondroitin 500-400 mg tablet Take 1 tablet by mouth once daily. , Historical Med      labetaloL (NORMODYNE) 100 MG tablet Take 100 mg by mouth 2 (two) times daily., Historical Med      lactase (LACTAID) 3,000 unit tablet Take 1 tablet by mouth 3 (three) times daily as needed., Historical Med      LORazepam (ATIVAN) 0.5 MG tablet TAKE 1/2 TABLET TO 1 TABLET BY MOUTH EVERY 12 HOURS AS NEEDED FOR ANXIETY, Normal      methocarbamoL (ROBAXIN) 750 MG Tab Take 1 tablet (750 mg total) by mouth 3 (three) times daily as needed., Starting Sat 5/20/2023, Until Mon 6/19/2023 at 2359, Normal      multivitamin capsule Take 1 capsule by mouth once daily., Historical Med      omeprazole (PRILOSEC OTC) 20 MG tablet Take 20 mg by mouth once daily., Historical Med      pravastatin (PRAVACHOL) 80 MG tablet Take 1 tablet (80 mg total) by mouth once daily., Starting Tue 6/21/2022, Normal      spironolactone (ALDACTONE) 25 MG tablet Take 1 tablet (25 mg total) by mouth 3 (three) times a week., Starting Wed 3/29/2023, Until Thu 3/28/2024, Normal      TURMERIC ORAL Take 500 mg by mouth once daily. , Historical Med      vitamin D (VITAMIN D3) 1000 units Tab Take 1,000 Units by mouth once daily., Historical Med           STOP taking these medications       cefadroxil (DURICEF) 500 MG Cap Comments:   Reason for Stopping:         felodipine (PLENDIL) 5 MG 24 hr tablet Comments:   Reason for Stopping:         mupirocin (BACTROBAN) 2 % ointment Comments:   Reason for Stopping:         mupirocin (BACTROBAN) 2 % ointment Comments:   Reason for Stopping:         polymyxin B sulf-trimethoprim (POLYTRIM) 10,000  unit- 1 mg/mL Drop Comments:   Reason for Stopping:               Discharge Diet:regular diet with Thin liquids    Activity: activity as tolerated    Discharge Condition: Good     Disposition: Home-Health Care [facility]    Future Appointments   Date Time Provider Department Center   6/26/2023  3:00 PM Bouchra Curtis MD [facility] DERM [place]   7/6/2023  3:20 PM Milton Cobian III, MD [facility] [address] [address] [place]   7/18/2023  1:30 PM Andi Cisneros DO [facility] PAINMA [place]   8/15/2023  3:20 PM Milton Cobian III, MD [facility] [place] [address] [place]   9/11/2023  2:45 PM Milton Mandujano MD [facility] DERM [place]   11/2/2023  2:00 PM Tomas Beltran MD [facility] IM [place]       35 minutes total time spent on the discharge of the patient including review of hospital course with the patient, reviewing discharge medications, and arranging follow-up care.      AMY L DEVINE, APRN [facility]   Skilled Nursing [facility]

## 2023-06-06 NOTE — PROGRESS NOTES
"Gerda Lai presents for initial post-operative visit following a right total knee arthroplasty performed by Dr. Cobian on 5/22/2023.      Exam:   Height 5' 3" (1.6 m), weight 64.3 kg (141 lb 12.1 oz).   Ambulating well with assistive device.  Incision is clean and dry without drainage or erythema.   ROM:0-100    Initial post-operative radiographs reviewed today revealing a well fixed and aligned prosthesis.    A/P:  2 weeks s/p right total knee replacement  - The patient was advised to keep the incision clean and dry for the next 24 hours after which she may wash the area with antibacterial soap in the shower. Will not submerge incision until one month post op.  - Outpatient PT: leaving SNF today and then will start HH  - Continue aspirin for 1 month post op.  - Pain medication refills from SNF  - Follow up in 4 weeks with surgeon. Pt will call clinic with problems/concerns.      "

## 2023-06-06 NOTE — NURSING
Pt left facility stable via wheelchair with family and transported home. Discharged papers received and reviewed. Pt verbalized understanding. No new skin issues noted. All personal belongings received. No concerns voiced upon departure.

## 2023-06-07 DIAGNOSIS — I10 ESSENTIAL (PRIMARY) HYPERTENSION: ICD-10-CM

## 2023-06-07 PROCEDURE — G0180 MD CERTIFICATION HHA PATIENT: HCPCS | Mod: ,,, | Performed by: HOSPITALIST

## 2023-06-07 PROCEDURE — G0180 PR HOME HEALTH MD CERTIFICATION: ICD-10-PCS | Mod: ,,, | Performed by: HOSPITALIST

## 2023-06-07 RX ORDER — LABETALOL 100 MG/1
TABLET, FILM COATED ORAL
Qty: 270 TABLET | Refills: 3 | Status: SHIPPED | OUTPATIENT
Start: 2023-06-07 | End: 2024-02-26

## 2023-06-07 NOTE — TELEPHONE ENCOUNTER
No care due was identified.  [facility] Embedded Care Due Messages. Reference number: [id number].   6/07/2023 7:57:47 AM CDT

## 2023-06-07 NOTE — TELEPHONE ENCOUNTER
Refill Routing Note   Medication(s) are not appropriate for processing by [facility] for the following reason(s):      No active prescription written by PCP    [facility] action(s):  Defer None identified            Appointments  past 12m or future 3m with PCP    Date Provider   Last Visit   5/2/2023 Tomas Beltran MD   Next Visit   11/2/2023 Tomas Beltran MD   ED visits in past 90 days: 0        Note composed:8:05 AM 06/07/2023

## 2023-06-13 ENCOUNTER — OFFICE VISIT (OUTPATIENT)
Dept: HOME HEALTH SERVICES | Facility: CLINIC | Age: 88
End: 2023-06-13
Payer: MEDICARE

## 2023-06-13 DIAGNOSIS — M17.11 PRIMARY OSTEOARTHRITIS OF RIGHT KNEE: Primary | ICD-10-CM

## 2023-06-13 DIAGNOSIS — Z96.651 STATUS POST RIGHT KNEE REPLACEMENT: Primary | ICD-10-CM

## 2023-06-13 PROCEDURE — 99495 TRANSJ CARE MGMT MOD F2F 14D: CPT | Mod: S$GLB,,, | Performed by: NURSE PRACTITIONER

## 2023-06-13 PROCEDURE — 99495 TCM SERVICES (MODERATE COMPLEXITY): ICD-10-PCS | Mod: S$GLB,,, | Performed by: NURSE PRACTITIONER

## 2023-06-13 NOTE — PROGRESS NOTES
[facility]  Established Patient - Audio Only Post-Hospitalization Telehealth Visit with [facility] Provider    Visit Date: 6/13/23   Encounter Provider: Evy Meehan NP  PCP:  Tomas Beltran MD    PRESENTING HISTORY      Patient ID: Gerda Lai     Consult Requested By:  No ref. provider found  Reason for Consult:  Post-Hospitalization Telehealth Visit    Chief Complaint: Transitional Care     History of Present Illness:       Admit Date: 5/26/2023   Date of Discharge:  6/6/2023  Length of Stay:  LOS: 11 days     HPI  Gerda Lai is a 89 year old female PMHx of CKD stage 3, anxiety, arthritis, HLD, HTN, and Right knee pain who presents to SNF following hospitalization for primary osteoarthritis of right knee s/p right total knee arthroplasty on 05/22/2023 with Dr. Cobian.  Admission to SNF for secondary weakness and debility.     Hospital Course  Patient progressed well with PT and OT- last PT note states that patient ambulated ambulated ~150 ft with stand by assistance and rolling walker.  Patient had no significant events during their stay at SNF. Home health was set up. DME not needed Follow up appointment to be made by patient within one week. All prescriptions and discharge instructions were ordered to be given to the patient prior to discharge.   _______________________________    Date of Service: 6/13/23        The patient location is: HOME  The chief complaint leading to consultation is: routine care for evaluation and management of chronic medial issues and medication review.     Visit type: Virtual visit with audio only (telephone)     The reason for the audio only service rather than synchronous audio and video virtual visit was related to technical difficulties or patient preference/necessity.     Each patient to whom I provide medical services by telemedicine is:  (1) informed of the relationship between the physician and patient and the respective role of any other health care  provider with respect to management of the patient; and (2) notified that they may decline to receive medical services by telemedicine and may withdraw from such care at any time. Patient verbally consented to receive this service via voice-only telephone call.     Subjective:   Today:  Ms. Gerda Lai is a 89 y.o. female being evaluated by telephone today for  transitional care visit to the home environment post-discharge from inpatient hospitalization encounter described above. Patient presents at baseline state of health as reported by patient and caregiver. Denies any acute issues, concerns or complaints to address on today's visit. Reports taking all medications as prescribed. No other needs identified at this time. Risks of environmental exposure to coronavirus discussed including: social distancing, hand hygiene, and limiting departures from the home for necessities only. Reports understanding and willingness to comevaluation and management of chronic medical issues and medication review. Patient denies any chest pain, SOB, nausea, vomiting, diarrhea, constipation, fever, chills, cough, known COVID exposure or illness. Reports taking all medications as prescribed. No other acute needs identified at this time. The patient is provided contact information to call to discuss any presenting concerns, questions or complaints until our next visit. Refills for all maintenance medications are confirmed on file at the patient's pharrmacy of choice.     OBJECTIVE:   Vital Signs: None Taken for this visit      Physical Exam   Laboratory  Lab Results   Component Value Date    WBC 8.11 06/05/2023    HGB 8.1 (L) 06/05/2023    HCT 24.7 (L) 06/05/2023     (H) 06/05/2023     06/05/2023     Lab Results   Component Value Date    INR 1.0 04/25/2023    INR 1.0 08/05/2020    INR 1.1 03/30/2016     Lab Results   Component Value Date    HGBA1C 5.5 05/27/2011     No results for input(s): POCTGLUCOSE in the last 72  hours.    TRANSITION OF CARE:     Family and/or Caretaker present at visit?  Yes.  Diagnostic tests reviewed/disposition: No diagnosic tests pending after this hospitalization.  Disease/illness education: Importance of Compliance with all prescribed medications and treatments, COVID Precautions/Social Distancing/Mask Use  Home health/community services discussion/referrals: Patient has home health established at [facility] .   Establishment or re-establishment of referral orders for community resources: No other necessary community resources.   Discussion with other health care providers: No discussion with other health care providers necessary.     Transition of Care Visit:  I have reviewed and updated the history and problem list. I have reconciled the medication list. I have discussed the hospitalization and current medical issues, prognosis and plans with the patient/family.     Medications Reconciliation:   I have reconciled the patient's home medications and discharge medications with the patient/family. I have updated all changes. Refer to After-Visit Medication List.    Discharge plans, follow-up instructions, future appointments, provider contact information, indicators to seek emergency treatment and encouragement to call for any questions, concerns or clarification of the patient's plan of care explained to patient and/or caregiver(s), whom confirm understanding of provided information and endorse willingness to comply.     Assessment:   Problem List Items Addressed on this Visit:  1. Primary osteoarthritis of right knee      Plan:     1. Primary osteoarthritis of right knee    Fall precautions  HOme PT  Pain management  - [facility] to schedule follow-up visit with patient in 4-6 weeks or PRN.    Patient Instructions Given:  - Continue all medications, treatments and therapies as ordered.   - Follow all instructions, recommendations as discussed.  - Maintain Safety Precautions at all times.  -  Attend all medical appointments as scheduled.  - For worsening symptoms: call Primary Care Physician or Nurse Practitioner.  - For emergencies, call 911 or immediately report to the nearest emergency room.    After Visit Medication List :     Medication List            Accurate as of June 13, 2023 11:59 PM. If you have any questions, ask your nurse or doctor.                CONTINUE taking these medications      acetaminophen 650 MG Tbsr  Commonly known as: TYLENOL  Take 1 tablet (650 mg total) by mouth every 8 (eight) hours.     albuterol 90 mcg/actuation inhaler  Commonly known as: PROVENTIL/VENTOLIN HFA  INHALE 1 TO 2 PUFFS BY MOUTH EVERY 6 HOURS AS NEEDED FOR WHEEZE     aliskiren 300 MG Tab  Commonly known as: TEKTURNA  TAKE 1 TABLET BY MOUTH EVERY DAY     amLODIPine 5 MG tablet  Commonly known as: NORVASC  Take 1 tablet (5 mg total) by mouth once daily.     aspirin 81 MG EC tablet  Commonly known as: ECOTRIN  Take 1 tablet (81 mg total) by mouth 2 (two) times daily.     clotrimazole-betamethasone 1-0.05% cream  Commonly known as: LOTRISONE  Apply topically once daily. To affected toenail.     coenzyme Q10 100 mg capsule     docusate sodium 100 MG capsule  Commonly known as: COLACE  Take 1 capsule (100 mg total) by mouth 2 (two) times daily.     glucosamine-chondroitin 500-400 mg tablet     labetaloL 100 MG tablet  Commonly known as: NORMODYNE  TAKE 1.5 TABLETS (150 MG TOTAL) BY MOUTH EVERY 12 (TWELVE) HOURS.     lactase 3,000 unit tablet  Commonly known as: LACTAID     LORazepam 0.5 MG tablet  Commonly known as: ATIVAN  TAKE 1/2 TABLET TO 1 TABLET BY MOUTH EVERY 12 HOURS AS NEEDED FOR ANXIETY     methocarbamoL 750 MG Tab  Commonly known as: ROBAXIN  Take 1 tablet (750 mg total) by mouth 3 (three) times daily as needed.     multivitamin capsule     omeprazole 20 MG tablet  Commonly known as: PRILOSEC OTC     oxyCODONE 5 MG immediate release tablet  Commonly known as: ROXICODONE  Take 1 tablet (5 mg total) by  mouth every 6 (six) hours as needed for Pain.     pravastatin 80 MG tablet  Commonly known as: PRAVACHOL  Take 1 tablet (80 mg total) by mouth once daily.     psyllium husk (aspartame) 3.4 gram Pwpk packet  Commonly known as: METAMUCIL  Take 1 packet by mouth once daily.     senna-docusate 8.6-50 mg 8.6-50 mg per tablet  Commonly known as: PERICOLACE  Take 1 tablet by mouth 2 (two) times daily as needed for Constipation.     spironolactone 25 MG tablet  Commonly known as: ALDACTONE  Take 1 tablet (25 mg total) by mouth 3 (three) times a week.     TURMERIC ORAL     vitamin D 1000 units Tab  Commonly known as: VITAMIN D3            Future Appointments   Date Time Provider Department Center   7/6/2023  3:00 PM [facility] [id number] LIMIT NOM XRAYORT [address]   7/6/2023  3:20 PM Milton Cobian III, MD [facility] ORTHO [address]   7/18/2023  1:30 PM Andi Cisneros DO [facility] PAIN[facility] [place]   8/15/2023  3:20 PM Milton Cobian III, MD [facility] ORTHO [address]   8/29/2023 12:00 PM Elaine Lucio NP [address]   9/11/2023  2:45 PM Milton Mandujano MD [facility]   11/2/2023  2:00 PM Tomas Beltran MD [facility] [place]     Risks of environmental exposure to coronavirus discussed including: social distancing, hand hygiene, and limiting departures from the home for necessities only. Reports understanding and willingness to comply.     Signature:    Evy Meehan, MSN, APRN, FNP-C  [facility]     Total time for this telephone encounter was 25 minutes. The following issues were discussed: primary and secondary diagnoses, co-morbidities, prescribed medications, treatment modalities, importance of compliance with medical advice and directives for follow-up care.    This service was not originating from a related E/M service provided within the previous 7 days nor will  to an E/M service or procedure within the next 24 hours or my soonest available appointment.  Prevailing standard of care was  able to be met in this audio-only visit.

## 2023-06-14 ENCOUNTER — PES CALL (OUTPATIENT)
Dept: ADMINISTRATIVE | Facility: CLINIC | Age: 88
End: 2023-06-14
Payer: MEDICARE

## 2023-06-26 ENCOUNTER — OFFICE VISIT (OUTPATIENT)
Dept: DERMATOLOGY | Facility: CLINIC | Age: 88
End: 2023-06-26
Payer: MEDICARE

## 2023-06-26 DIAGNOSIS — L57.0 AK (ACTINIC KERATOSIS): Primary | ICD-10-CM

## 2023-06-26 DIAGNOSIS — D18.01 CHERRY ANGIOMA: ICD-10-CM

## 2023-06-26 DIAGNOSIS — L81.4 LENTIGO: ICD-10-CM

## 2023-06-26 DIAGNOSIS — D22.9 NEVUS: ICD-10-CM

## 2023-06-26 DIAGNOSIS — L82.1 SK (SEBORRHEIC KERATOSIS): ICD-10-CM

## 2023-06-26 DIAGNOSIS — Z85.828 PERSONAL HISTORY OF SKIN CANCER: ICD-10-CM

## 2023-06-26 PROCEDURE — 99999 PR PBB SHADOW E&M-EST. PATIENT-LVL III: CPT | Mod: PBBFAC,,, | Performed by: DERMATOLOGY

## 2023-06-26 PROCEDURE — 99213 OFFICE O/P EST LOW 20 MIN: CPT | Mod: PBBFAC,25,PO | Performed by: DERMATOLOGY

## 2023-06-26 PROCEDURE — 17003 DESTRUCT PREMALG LES 2-14: CPT | Mod: S$PBB,,, | Performed by: DERMATOLOGY

## 2023-06-26 PROCEDURE — 99999 PR PBB SHADOW E&M-EST. PATIENT-LVL III: ICD-10-PCS | Mod: PBBFAC,,, | Performed by: DERMATOLOGY

## 2023-06-26 PROCEDURE — 99213 OFFICE O/P EST LOW 20 MIN: CPT | Mod: 25,S$PBB,, | Performed by: DERMATOLOGY

## 2023-06-26 PROCEDURE — 17000 DESTRUCT PREMALG LESION: CPT | Mod: PBBFAC,PO | Performed by: DERMATOLOGY

## 2023-06-26 PROCEDURE — 17003 DESTRUCT PREMALG LES 2-14: CPT | Mod: PBBFAC,PO | Performed by: DERMATOLOGY

## 2023-06-26 PROCEDURE — 17000 DESTRUCT PREMALG LESION: CPT | Mod: S$PBB,,, | Performed by: DERMATOLOGY

## 2023-06-26 PROCEDURE — 17003 DESTRUCTION, PREMALIGNANT LESIONS; SECOND THROUGH 14 LESIONS: ICD-10-PCS | Mod: S$PBB,,, | Performed by: DERMATOLOGY

## 2023-06-26 PROCEDURE — 99213 PR OFFICE/OUTPT VISIT, EST, LEVL III, 20-29 MIN: ICD-10-PCS | Mod: 25,S$PBB,, | Performed by: DERMATOLOGY

## 2023-06-26 PROCEDURE — 17000 PR DESTRUCTION(LASER SURGERY,CRYOSURGERY,CHEMOSURGERY),PREMALIGNANT LESIONS,FIRST LESION: ICD-10-PCS | Mod: S$PBB,,, | Performed by: DERMATOLOGY

## 2023-06-26 NOTE — PROGRESS NOTES
"  Subjective:      Patient ID:  Gerda Lai is a 89 y.o. female who presents for   Chief Complaint   Patient presents with    Skin Check     ubse     Patient is here today for UBSE.     This is a high risk patient here to check for the development of new lesions.    Pt's is here today with niece, Marilynn     Pt states the AK's treated at last visit have not completely resolved. No pain, pt states she can "feel them"    Review of Systems   Skin:  Negative for daily sunscreen use, activity-related sunscreen use and recent sunburn.   Hematologic/Lymphatic: Bruises/bleeds easily.     Objective:   Physical Exam   Constitutional: She appears well-developed and well-nourished. No distress.   Neurological: She is alert and oriented to person, place, and time. She is not disoriented.   Psychiatric: She has a normal mood and affect.   Skin:   Areas Examined (abnormalities noted in diagram):   Scalp / Hair Palpated and Inspected  Head / Face Inspection Performed  Neck Inspection Performed  Chest / Axilla Inspection Performed  Abdomen Inspection Performed  Back Inspection Performed  RUE Inspected  LUE Inspection Performed                   Diagram Legend     Erythematous scaling macule/papule c/w actinic keratosis       Vascular papule c/w angioma      Pigmented verrucoid papule/plaque c/w seborrheic keratosis      Yellow umbilicated papule c/w sebaceous hyperplasia      Irregularly shaped tan macule c/w lentigo     1-2 mm smooth white papules consistent with Milia      Movable subcutaneous cyst with punctum c/w epidermal inclusion cyst      Subcutaneous movable cyst c/w pilar cyst      Firm pink to brown papule c/w dermatofibroma      Pedunculated fleshy papule(s) c/w skin tag(s)      Evenly pigmented macule c/w junctional nevus     Mildly variegated pigmented, slightly irregular-bordered macule c/w mildly atypical nevus      Flesh colored to evenly pigmented papule c/w intradermal nevus       Pink pearly papule/plaque c/w " basal cell carcinoma      Erythematous hyperkeratotic cursted plaque c/w SCC      Surgical scar with no sign of skin cancer recurrence      Open and closed comedones      Inflammatory papules and pustules      Verrucoid papule consistent consistent with wart     Erythematous eczematous patches and plaques     Dystrophic onycholytic nail with subungual debris c/w onychomycosis     Umbilicated papule    Erythematous-base heme-crusted tan verrucoid plaque consistent with inflamed seborrheic keratosis     Erythematous Silvery Scaling Plaque c/w Psoriasis     See annotation      Assessment / Plan:        AK (actinic keratosis)  Cryosurgery Procedure Note    Verbal consent from the patient is obtained including, but not limited to, risk of hypopigmentation/hyperpigmentation, scar, recurrence of lesion. The patient is aware of the precancerous quality and need for treatment of these lesions. Liquid nitrogen cryosurgery is applied to the 9 actinic keratoses, as detailed in the physical exam, to produce a freeze injury. The patient is aware that blisters may form and is instructed on wound care with gentle cleansing and use of vaseline ointment to keep moist until healed. The patient is supplied a handout on cryosurgery and is instructed to call if lesions do not completely resolve.    Lentigo  This is a benign hyperpigmented sun induced lesion. Recommend daily sun protection/avoidance and use of at least SPF 30, broad spectrum sunscreen (OTC drug) will reduce the number of new lesions. Treatment of these benign lesions are considered cosmetic.  The nature of sun-induced photo-aging and skin cancers is discussed.  Sun avoidance, protective clothing, and the use of 30-SPF sunscreens is advised. Observe closely for skin damage/changes, and call if such occurs.    SK (seborrheic keratosis)  These are benign inherited growths without a malignant potential. Reassurance given to patient. No treatment is necessary.      Nevus  Discussed ABCDE's of nevi.  Monitor for new mole or moles that are becoming bigger, darker, irritated, or developing irregular borders. Brochure provided. Instructed patient to observe lesion(s) for changes and follow up in clinic if changes are noted. Patient to monitor skin at home for new or changing lesions.     Cherry angioma  These are benign vascular lesions that are inherited.  Treatment is not necessary.    Personal history of skin cancer  Area(s) of previous NMSC evaluated with no signs of recurrence.    Upper body skin examination performed today including at least 6 points as noted in physical examination. No lesions suspicious for malignancy noted.    Recommend daily sun protection/avoidance and use of at least SPF 30, broad spectrum sunscreen (OTC drug).              Follow up in about 6 months (around 12/26/2023) for UBSE.

## 2023-06-29 RX ORDER — ASPIRIN 81 MG/1
TABLET ORAL
Qty: 90 TABLET | Refills: 3 | Status: SHIPPED | OUTPATIENT
Start: 2023-06-29

## 2023-07-05 ENCOUNTER — EXTERNAL HOME HEALTH (OUTPATIENT)
Dept: HOME HEALTH SERVICES | Facility: HOSPITAL | Age: 88
End: 2023-07-05
Payer: MEDICARE

## 2023-07-06 ENCOUNTER — OFFICE VISIT (OUTPATIENT)
Dept: ORTHOPEDICS | Facility: CLINIC | Age: 88
End: 2023-07-06
Payer: MEDICARE

## 2023-07-06 ENCOUNTER — HOSPITAL ENCOUNTER (OUTPATIENT)
Dept: RADIOLOGY | Facility: HOSPITAL | Age: 88
Discharge: HOME | End: 2023-07-06
Attending: ORTHOPAEDIC SURGERY
Payer: MEDICARE

## 2023-07-06 VITALS — BODY MASS INDEX: 24.98 KG/M2 | HEIGHT: 63 IN | WEIGHT: 141 LBS

## 2023-07-06 DIAGNOSIS — Z96.651 STATUS POST RIGHT KNEE REPLACEMENT: Primary | ICD-10-CM

## 2023-07-06 DIAGNOSIS — Z96.651 STATUS POST RIGHT KNEE REPLACEMENT: ICD-10-CM

## 2023-07-06 PROCEDURE — 99999 PR PBB SHADOW E&M-EST. PATIENT-LVL IV: CPT | Mod: PBBFAC,,, | Performed by: ORTHOPAEDIC SURGERY

## 2023-07-06 PROCEDURE — 73562 XR KNEE 3 VIEW RIGHT: ICD-10-PCS | Mod: 26,RT,, | Performed by: RADIOLOGY

## 2023-07-06 PROCEDURE — 73562 X-RAY EXAM OF KNEE 3: CPT | Mod: TC,RT

## 2023-07-06 PROCEDURE — 73562 X-RAY EXAM OF KNEE 3: CPT | Mod: 26,RT,, | Performed by: RADIOLOGY

## 2023-07-06 PROCEDURE — 99024 PR POST-OP FOLLOW-UP VISIT: ICD-10-PCS | Mod: POP,,, | Performed by: ORTHOPAEDIC SURGERY

## 2023-07-06 PROCEDURE — 99214 OFFICE O/P EST MOD 30 MIN: CPT | Mod: PBBFAC | Performed by: ORTHOPAEDIC SURGERY

## 2023-07-06 PROCEDURE — 99999 PR PBB SHADOW E&M-EST. PATIENT-LVL IV: ICD-10-PCS | Mod: PBBFAC,,, | Performed by: ORTHOPAEDIC SURGERY

## 2023-07-06 PROCEDURE — 99024 POSTOP FOLLOW-UP VISIT: CPT | Mod: POP,,, | Performed by: ORTHOPAEDIC SURGERY

## 2023-07-06 NOTE — PROGRESS NOTES
Subjective:     HPI:   Gerda Lai is a 89 y.o. female who presents 6 weeks out from right TKA    Date of surgery: 5/22/23, d/c'd to SNF    Medications:   tylenol TID-QID  Oxycodone nightly x1 week, tapering down to 1/2 tab past few nights    Assistive Devices: cane     PT: finishing 2 weeks of HH PT tomorrow with Fritz tomorrow after SNF    Limitations: none    Overall doing well  Wants to go to therapy to work on walking/stability  Feels like robotic TKA was easier than L TKA, satisfied with progress     Objective:   Body mass index is 24.98 kg/m².  Exam:    Gait: limp/antalgic none    Incision: healed    Stability:  Knee stable anterior-posterior varus and valgus stresses, no extensor lag    Extension: 5    Flexion: 123    Valgus angle: 7    Pre-op 2-130, 7 deg valgus      Imaging:  Indication:  Exam status post right total knee arthroplasty  Exam Ordered: Radiographs of the right knee include a standing anteroposterior view, a lateral view in full flexion, and a sunrise view  Details of Examination: Todays exam show a well fixed, well positioned total knee arthroplasty with no evidence of wear, osteolysis, or loosening.  Impression:  Status post right total knee arthroplasty, implant in good position with no abnormality      Assessment:       ICD-10-CM ICD-9-CM   1. Status post right knee replacement  Z96.651 V43.65      Doing well     Plan:       Patient is doing very well with their total knee arthroplasty.  They will continue with their routine care of the knee replacement and see me back for their follow-up at the routine interval.  If there are problems in the interim they will see me back sooner.    Finish HH PT tomorrow  Wants to got PT for gait training/balance/stability    Would prefer to be done with narcotics, continue tylenol PRN    6 week follow up       No orders of the defined types were placed in this encounter.            Past Medical History:   Diagnosis Date    Anxiety     Arthritis      Basal cell carcinoma 09/2016    right post auricular neck     Basal cell carcinoma of right forearm 04/19/2023    R lower forearm    Breast cancer 1992    right    Cataract     Fibromyalgia     History of measles as a child     Hyperlipidemia     Hypertension     Personal history of colonic polyps     Pneumonia     SCC (squamous cell carcinoma) 2015    R chest    SCC (squamous cell carcinoma) 2016    left medial shoulder    SCC (squamous cell carcinoma) 2017    left knee    SCC (squamous cell carcinoma) 2022    L upper chest, R upper arm KA types    Shingles     Squamous cell carcinoma 2015    right forearm    Thyroid disease     Vaginitis        Past Surgical History:   Procedure Laterality Date    ADENOIDECTOMY      ARTHROPLASTY, KNEE, TOTAL, USING COMPUTER-ASSISTED NAVIGATION Right 5/22/2023    Procedure: ARTHROPLASTY, KNEE, TOTAL, USING COMPUTER-ASSISTED NAVIGATION: QUYEN: RIGHT: MARTHA - TRIATHALON;  Surgeon: Milton Cobian III, MD;  Location: [facility] OR;  Service: Orthopedics;  Laterality: Right;    BREAST BIOPSY Left     Excisional bx, benign    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    CARPAL TUNNEL RELEASE Right 3/16/2021    Procedure: RELEASE, CARPAL TUNNEL;  Surgeon: Barbara Aldridge MD;  Location: [facility] OR;  Service: Orthopedics;  Laterality: Right;    CATARACT EXTRACTION W/  INTRAOCULAR LENS IMPLANT Bilateral     EPIDURAL STEROID INJECTION N/A 2/24/2023    Procedure: HEIDI C7-T1;  Surgeon: Andi Cisneros DO;  Location: [facility] OR;  Service: Pain Management;  Laterality: N/A;    EYE SURGERY      HAND SURGERY      INJECTION OF ANESTHETIC AGENT AROUND MEDIAL BRANCH NERVES INNERVATING CERVICAL FACET JOINT N/A 1/4/2022    Procedure: Cervical Medial Branch Block C5-6, C6-7 (No Sedation);  Surgeon: Himanshu Blackmon Jr., MD;  Location: [facility] PAIN MGT;  Service: Pain Management;  Laterality: N/A;    INJECTION OF ANESTHETIC AGENT AROUND MEDIAL BRANCH NERVES INNERVATING LUMBAR FACET JOINT Left 11/18/2021     Procedure: Cervical Medial Branch Block Left C5-6, C6-7 (IV Sedation);  Surgeon: Himanshu Blackmon Jr., MD;  Location: [facility] PAIN MGT;  Service: Pain Management;  Laterality: Left;    JOINT REPLACEMENT Right     ELZBIETA    KNEE ARTHROPLASTY Left 8/10/2020    Procedure: ARTHROPLASTY, KNEE-ADE NEXGEN/CEMENTED TIBIA;  Surgeon: Kalin Pacheco MD;  Location: [facility] OR;  Service: Orthopedics;  Laterality: Left;    RADIOFREQUENCY THERMAL COAGULATION OF MEDIAL BRANCH OF POSTERIOR RAMUS OF CERVICAL SPINAL NERVE Left 3/8/2022    Procedure: RADIOFREQUENCY THERMAL COAGULATION, NERVE, SPINAL, CERVICAL, LEFT C5-6 AND C6-7;  Surgeon: Himanshu Blackmon Jr., MD;  Location: [facility] PAIN MGT;  Service: Pain Management;  Laterality: Left;  NO PACEMAKER   ASA    thyriodectomy      partial    tonsillectomy      TONSILLECTOMY      TOTAL HIP ARTHROPLASTY      right    Yag  Left        Family History   Problem Relation Age of Onset    Breast cancer Mother     Cancer Mother     Stroke Paternal Grandfather     Heart disease Father     Cancer Paternal Aunt     Heart disease Paternal Aunt     Glaucoma Paternal Grandmother     Amblyopia Neg Hx     Blindness Neg Hx     Cataracts Neg Hx     Diabetes Neg Hx     Hypertension Neg Hx     Macular degeneration Neg Hx     Retinal detachment Neg Hx     Strabismus Neg Hx     Thyroid disease Neg Hx     Melanoma Neg Hx        Social History     Socioeconomic History    Marital status: Single   Occupational History     Employer: RETIRED   Tobacco Use    Smoking status: Never     Passive exposure: Never    Smokeless tobacco: Never   Substance and Sexual Activity    Alcohol use: Yes     Comment: socially - celebrations only    Drug use: Never    Sexual activity: Not Currently     Social Determinants of Health     Financial Resource Strain: Low Risk     Difficulty of Paying Living Expenses: Not hard at all   Food Insecurity: No Food Insecurity    Worried About Running Out of Food in the Last Year: Never true    Ran  Out of Food in the Last Year: Never true   Transportation Needs: No Transportation Needs    Lack of Transportation (Medical): No    Lack of Transportation (Non-Medical): No   Stress: Stress Concern Present    Feeling of Stress : To some extent   Housing Stability: Unknown    Unable to Pay for Housing in the Last Year: No    Unstable Housing in the Last Year: No

## 2023-07-10 ENCOUNTER — PATIENT MESSAGE (OUTPATIENT)
Dept: ORTHOPEDICS | Facility: CLINIC | Age: 88
End: 2023-07-10
Payer: MEDICARE

## 2023-07-12 ENCOUNTER — CLINICAL SUPPORT (OUTPATIENT)
Dept: REHABILITATION | Facility: HOSPITAL | Age: 88
End: 2023-07-12
Attending: ORTHOPAEDIC SURGERY
Payer: MEDICARE

## 2023-07-12 DIAGNOSIS — R26.2 DIFFICULTY WALKING: ICD-10-CM

## 2023-07-12 DIAGNOSIS — Z96.651 STATUS POST RIGHT KNEE REPLACEMENT: ICD-10-CM

## 2023-07-12 PROCEDURE — 97162 PT EVAL MOD COMPLEX 30 MIN: CPT | Mod: PO

## 2023-07-12 PROCEDURE — 97110 THERAPEUTIC EXERCISES: CPT | Mod: PO

## 2023-07-12 NOTE — PLAN OF CARE
[facility]   Physical Therapy Initial Evaluation      Name: Gerda Lai  Clinic Number: [id number]    Therapy Diagnosis:   Encounter Diagnoses   Name Primary?    Status post right knee replacement     Difficulty walking         Physician: Milton Cobian III, *    Physician Orders: PT Eval and Treat, R knee Note:    Eval and treat with modalities: s/p R TKA, focus on general gait training/balance/stability, does not need aggressive R TKA rehab (would normally be done with knee PT) 2x/week x4 weeks  Medical Diagnosis from Referral: Status post right knee replacement [Z96.651]  Evaluation Date: 7/12/2023  Authorization Period Expiration: 7/5/2024  Plan of Care Expiration: 10/12/2024  Progress Note Due: 8/12/2023   Visit # / Visits authorized: 1/ 1   FOTO: 1/3    Precautions: Standard  and R TKA     Time In: 5:10  Time Out: 6:00  Total Billable Time: 50 minutes    Subjective     Date of onset: 5/22/2023     History of current condition - Gerda reports:     SP RIGHT TKA; DOS 5/22/2023     Had R knee operated on.   Had home health for 2 weeks.   Saw surgeon last week and told him she is doing well.   She wanted to continue with therapy.   She states she wants to be more confident with walking.   Uses SPC for 2 years.   Denies numbness and tingling.   She is fortunate that her pain is not that much.   Pt denies restrictions.         Falls: 1 Fall in march     Imaging: see scanned     Prior Therapy: yes   Social History:  lives alone, one level   Prior Level of Function: mod indep 2 years   Current Level of Function: mod indep     Pain:  Current 1/10, worst 2/10, best 0/10   Location: Right lateral knee   Description: Sharp  Aggravating Factors: idiopathic   Easing Factors: resting, exercise     Patients goals: walk without the cane      Medical History:   Past Medical History:   Diagnosis Date    Anxiety     Arthritis     Basal cell carcinoma 09/2016    right post auricular neck     Basal  cell carcinoma of right forearm 04/19/2023    R lower forearm    Breast cancer 1992    right    Cataract     Fibromyalgia     History of measles as a child     Hyperlipidemia     Hypertension     Personal history of colonic polyps     Pneumonia     SCC (squamous cell carcinoma) 2015    R chest    SCC (squamous cell carcinoma) 2016    left medial shoulder    SCC (squamous cell carcinoma) 2017    left knee    SCC (squamous cell carcinoma) 2022    L upper chest, R upper arm KA types    Shingles     Squamous cell carcinoma 2015    right forearm    Thyroid disease     Vaginitis        Surgical History:   Gerda Lai  has a past surgical history that includes thyriodectomy; Total hip arthroplasty; tonsillectomy; Adenoidectomy; Cataract extraction w/  intraocular lens implant (Bilateral); Yag  (Left); Breast lumpectomy (Right, 1992); Breast biopsy (Left); Eye surgery; Tonsillectomy; Joint replacement (Right); Knee Arthroplasty (Left, 8/10/2020); Carpal tunnel release (Right, 3/16/2021); Hand surgery; Injection of anesthetic agent around medial branch nerves innervating lumbar facet joint (Left, 11/18/2021); Injection of anesthetic agent around medial branch nerves innervating cervical facet joint (N/A, 1/4/2022); Radiofrequency thermal coagulation of medial branch of posterior ramus of cervical spinal nerve (Left, 3/8/2022); Epidural steroid injection (N/A, 2/24/2023); and arthroplasty, knee, total, using computer-assisted navigation (Right, 5/22/2023).    Medications:   Gerda has a current medication list which includes the following prescription(s): acetaminophen, albuterol, aliskiren, amlodipine, aspirin, clotrimazole-betamethasone 1-0.05%, coenzyme q10, glucosamine-chondroitin, labetalol, lactase, lorazepam, multivitamin, omeprazole, pravastatin, psyllium husk (aspartame), senna-docusate 8.6-50 mg, spironolactone, turmeric, and vitamin d.    Allergies:   Review of patient's allergies indicates:   Allergen  Reactions    Sulfa (sulfonamide antibiotics) Rash    Clarithromycin Other (See Comments)     Weak, extreme fatigue. dizziness    Flexeril [cyclobenzaprine] Other (See Comments)     Dizziness    Iodine and iodide containing products     Lisinopril Other (See Comments)     Cough and sensation of throat swelling/?angioedema    Losartan Rash    Metoprolol Swelling     Tightness in throat    Spironolactone      Throat tightening    Tramadol Other (See Comments)     Dizzy and weak    Verapamil (bulk) Palpitations    Voltaren [diclofenac sodium] Other (See Comments)     Drops blood pressure        Objective      Observation:  Gait: SPC, decreased speed, decreased stride         Functional tests:   Tug test: 18.67s, SPC   5 x sit to stand test: 17.58s, Mod UE usage       Range of Motion (Active):   Knee Right  Left    Flexion 110 (mild p) 123   Extension -3 -3     Quad set Right; mild limitations vs L     Joint Mobility:     LEFT  PF 3/6 ; except sup 2/6  RIGHT  PF 3/6; except sup 2/6     LEFT   TF post 2/6, ant 3/6  RIGHT   TF post 2/6, ant 2/6      Palpation: R knee incision anterior healing clean and normal. No ttp R knee. Neg DF sign. Post gastroc no ttp, no warmth. Scar mob 3/6.     Sensation: gross intact     Edema: mild edema noted R knee         Intake Outcome Measure for FOTO knee Survey    Therapist reviewed FOTO scores for Gerda Lai on 7/12/2023.   FOTO documents entered into [employer] - see Media section.    Intake Score: complete at another time %         Treatment     Total Treatment time (time-based codes) separate from Evaluation: 15 minutes     Gerda received the treatments listed below:      therapeutic exercises to develop strength, endurance, ROM, flexibility, posture, and core stabilization for 15 minutes including:  Quad set 5s x 2 x 5   Heel slide supine 2 x 10   SLR 5s x  2 x 5   Seated laq 2 x 10           Patient Education and Home Exercises     Education provided:   - Yes, HEP handout.      Written Home Exercises Provided: yes. Exercises were reviewed and Gerda was able to demonstrate them prior to the end of the session.  Gerda demonstrated good  understanding of the education provided. See EMR under Patient Instructions for exercises provided during therapy sessions.    Assessment     Gerda is a 89 y.o. female referred to outpatient Physical Therapy with a medical diagnosis of Status post right knee replacement [Z96.651]. Patient presents with s/s consistent with medical dx of SP RIGHT TKA; DOS 5/22/2023. Pt presents with Right knee arom/strength deficits and abnormal functional testing including tug and 5x sit to stand. Pt will benefit from progressive skilled physical therapy to improve said impairments to reduce future risk of falls.     Patient prognosis is Excellent.   Patient will benefit from skilled outpatient Physical Therapy to address the deficits stated above and in the chart below, provide patient /family education, and to maximize patientt's level of independence.     Plan of care discussed with patient: Yes  Patient's spiritual, cultural and educational needs considered and patient is agreeable to the plan of care and goals as stated below:     Anticipated Barriers for therapy: none     Medical Necessity is demonstrated by the following  History  Co-morbidities and personal factors that may impact the plan of care [] LOW: no personal factors / co-morbidities  [x] MODERATE: 1-2 personal factors / co-morbidities  [] HIGH: 3+ personal factors / co-morbidities    Moderate / High Support Documentation:   Co-morbidities affecting plan of care: OA, FMS    Personal Factors:   age  Previous sx      Examination  Body Structures and Functions, activity limitations and participation restrictions that may impact the plan of care [x] LOW: addressing 1-2 elements  [] MODERATE: 3+ elements  [] HIGH: 4+ elements (please support below)    Moderate / High Support Documentation:      Clinical  Presentation [x] LOW: stable  [] MODERATE: Evolving  [] HIGH: Unstable     Decision Making/ Complexity Score: low       Goals:  Short Term Goals:   Pt demonstrate indep with initial hep 4 weeks   Pt demonstrate equalized knee arom to normalize gait  4 weeks     Long Term Goals:  Pt demonstrate tug test 13 sec or less to decrease fall risk  8 weeks   Pt demonstrate 5 x sit to stand 13 secs or less to improve LE strength for community ambulation  8 weeks     Plan     Plan of care Certification: 7/12/2023 to 10/12/2023.    Outpatient Physical Therapy 2 times weekly for 6 weeks to include the following interventions: Gait Training, Manual Therapy, Moist Heat/ Ice, Neuromuscular Re-ed, Patient Education, Self Care, Therapeutic Activities, and Therapeutic Exercise.     Carlos Bravo, PT

## 2023-07-18 ENCOUNTER — OFFICE VISIT (OUTPATIENT)
Dept: PAIN MEDICINE | Facility: CLINIC | Age: 88
End: 2023-07-18
Payer: MEDICARE

## 2023-07-18 VITALS
SYSTOLIC BLOOD PRESSURE: 187 MMHG | HEART RATE: 57 BPM | WEIGHT: 141.13 LBS | BODY MASS INDEX: 25.01 KG/M2 | OXYGEN SATURATION: 100 % | HEIGHT: 63 IN | DIASTOLIC BLOOD PRESSURE: 71 MMHG

## 2023-07-18 DIAGNOSIS — M54.12 CERVICAL RADICULOPATHY: ICD-10-CM

## 2023-07-18 DIAGNOSIS — M54.50 CHRONIC BILATERAL LOW BACK PAIN WITHOUT SCIATICA: Primary | ICD-10-CM

## 2023-07-18 DIAGNOSIS — M48.02 CERVICAL STENOSIS OF SPINAL CANAL: ICD-10-CM

## 2023-07-18 DIAGNOSIS — M47.812 CERVICAL SPONDYLOSIS: ICD-10-CM

## 2023-07-18 DIAGNOSIS — G89.29 CHRONIC BILATERAL LOW BACK PAIN WITHOUT SCIATICA: Primary | ICD-10-CM

## 2023-07-18 PROCEDURE — 99214 OFFICE O/P EST MOD 30 MIN: CPT | Mod: S$PBB,,, | Performed by: STUDENT IN AN ORGANIZED HEALTH CARE EDUCATION/TRAINING PROGRAM

## 2023-07-18 PROCEDURE — 99213 OFFICE O/P EST LOW 20 MIN: CPT | Mod: PBBFAC,PN | Performed by: STUDENT IN AN ORGANIZED HEALTH CARE EDUCATION/TRAINING PROGRAM

## 2023-07-18 PROCEDURE — 99214 PR OFFICE/OUTPT VISIT, EST, LEVL IV, 30-39 MIN: ICD-10-PCS | Mod: S$PBB,,, | Performed by: STUDENT IN AN ORGANIZED HEALTH CARE EDUCATION/TRAINING PROGRAM

## 2023-07-18 PROCEDURE — 99999 PR PBB SHADOW E&M-EST. PATIENT-LVL III: ICD-10-PCS | Mod: PBBFAC,,, | Performed by: STUDENT IN AN ORGANIZED HEALTH CARE EDUCATION/TRAINING PROGRAM

## 2023-07-18 PROCEDURE — 99999 PR PBB SHADOW E&M-EST. PATIENT-LVL III: CPT | Mod: PBBFAC,,, | Performed by: STUDENT IN AN ORGANIZED HEALTH CARE EDUCATION/TRAINING PROGRAM

## 2023-07-18 NOTE — PROGRESS NOTES
Chronic Pain - f/u    Referring Physician: No ref. provider found    Date: 07/18/2023     Re: Gerda Lai  MR#: [id number]  YOB: 1933  Age: 89 y.o.    Chief Complaint: neck pain  No chief complaint on file.    **This note is dictated using the M*Modal Fluency Direct word recognition program. There are word recognition mistakes that are occasionally missed on review.**    ASSESSMENT: 89 y.o. year old female with neck and arm pain, consistent with     1. Chronic bilateral low back pain without sciatica  Acupuncture      2. Cervical spondylosis        3. Cervical stenosis of spinal canal        4. Cervical radiculopathy          PLAN:     Cervical spinal stenosis and radiculopathy  -Prior records reviewed. Previous patient of Dr. Blackmon, then Rebeca Gregorio and now to me.  -imaging discussed with patient. Moderate/severe CS and FS at multiple levels. Has autofusion at C3-4.  -2/23/23 - s/p HEIDY on 2/23/23 w/ 80% @2m and 5m.  -requesting acupuncture referral. New referral placed    Right knee TKA  -s/p TKA 5/22/23 with great results.  She is walking better and feels better. Knee is not giving out anymore.    - RTC 6 months  - Counseled patient regarding the importance of weight loss and activity modification and physical therapy.    The above plan and management options were discussed at length with patient. Patient is in agreement with the above and verbalized understanding. It will be communicated with the referring physician via electronic record, fax, or mail.  Lab/study reports reviewed were important and necessary because subsequent medical and treatment recommendations required review of the above lab/study reports. Images viewed/reviewed above were important and necessary because subsequent medical and treatment recommendations required review of the reviewed image(s).     Electronically signed by:  Andi Cisneros  DO  07/18/2023    =========================================================================================================    SUBJECTIVE:    Interval History 7/18/2023:   Gerda Lai is a 89 y.o. female presents to the clinic for follow up.  Since last visit the pain has has significantly improved. The patient is s/p knee surgery on 5/22/23 and that has gone very well.  She is walking more and doing more.  She had acupuncture for the first treatment, but never got a call back for the 2nd one.     The pain is located in the neck area and radiates to the head .  The pain is described as throbbing and tingling    At BEST  0/10   At WORST  3/10 on the WORST day.    On average pain is rated as 2/10.   Today the pain is rated as 2/10  Symptoms interfere with daily activity.   Exacerbating factors: daily activities.    Mitigating factors massage, medications, and physical therapy.     Current pain medications: Tylenol arthritis TID-QID. ASA 81.  Failed Pain Medications: Tylenol, tumeric    Pain procedures:   03/2022 - left cervical C5,6,7 RFA  02/24/23 - HEIDY C7-T1 - 80% at 2 months    Initial hx:  Gerda Lai is a 89 y.o. female presents to the clinic for the evaluation of neck pain. The pain started 1 month ago following surgery on neck  and symptoms have been worsening. Patient was seeing Rebeca Gregorio and transferring care to me. She is s/p HEIDY at C7-T1 on 2/20/23 which has been helpful for the pain in the neck and arm.  She continues to have tingling but improvement in the pain.    She is having some other issues especially in the legs which she is being worked up with her PCP/cardiology. She has an appointment with cardiology to evaluate.    Pain Description:    The pain is located in the neck area and radiates to the upper head/ back of the head .    At BEST  1/10   At WORST  4/10 on the WORST day.    On average pain is rated as 1/10.   Today the pain is rated as 1/10  The pain is continuous.  The pain is  described as tingling    Symptoms interfere with daily activity and sleeping.   Exacerbating factors: daily activity.    Mitigating factors nothing and rest.   She reports 8 hours of sleep per night.    Physical Therapy/Home Exercise: Yes, currently has a home exercise program. She exercises everyday in the morning.     Current Pain Medications:    - Tylenol arthritis TID-QID. ASA 81.    Failed Pain Medications:    - tylenol, tumeric    Pain Treatment Therapies:    Physical Therapy: does home exercise programs  Chiropractor: none  Acupuncture: none  TENS unit: none  Spinal decompression: none  Joint replacement: hip and knee replacement    Patient denies urinary incontinence, bowel incontinence, and loss of sensations.  Patient denies any suicidal or homicidal ideations     report:  Reviewed and consistent with medication use as prescribed.    Imaging:   MRI Cervical 01/2023:  FINDINGS:  Straightening of the cervical lordosis.  Grade 1 retrolisthesis of C4-C5.  Grade 1 anterolisthesis of C6-C7, C7-T1 and T1-T2.  Degenerative endplate changes throughout the cervical spine.  No fracture or marrow infiltrative process.  Severe disc height loss at C3 through C6.  Suspected fusion of the C3-C4 disc space.  Osseous fusion of the left C2-C3 and C3-C4 facet joints.     Pre dens space is maintained.  Dens is intact.  There is thickening of the transverse ligament with moderate narrowing of the spinal canal between the transverse ligament and posterior arch of C1.     Cervical spinal cord demonstrates normal signal intensity.  Cerebellar tonsils are in their expected location.  Partially visualized posterior fossa is unremarkable.     Vertebral artery flow voids are present.  Paraspinal musculature demonstrates normal bulk and signal intensity.  Note made of cystic right thyroid nodule.     C2-C3: Uncovertebral spurring and severe facet arthropathy result in moderate left neural foraminal narrowing.     C3-C4: Posterior  disc osteophyte complex with uncovertebral spurring and severe facet arthropathy result in severe left, moderate right neural foraminal narrowing.     C4-C5: Posterior disc osteophyte complex with uncovertebral spurring and severe facet arthropathy result in moderate spinal canal stenosis and severe bilateral neural foraminal narrowing.     C5-C6: Posterior disc osteophyte complex with uncovertebral spurring and severe facet arthropathy result in mild spinal canal stenosis and severe right neural foraminal narrowing.     C6-C7: Posterior disc osteophyte complex and moderate facet arthropathy noted.  No spinal canal stenosis or neural foraminal narrowing.     C7-T1: Moderate facet arthropathy noted.  No spinal canal stenosis or neural foraminal narrowing.     Impression:     1. Multilevel degenerative changes of the cervical spine detailed above.  Moderate spinal canal stenosis noted at the level of C1 and C4-C5.  Moderate to severe neural foraminal narrowing noted at C2-C6.  2. Fusion across the C3-C4 disc space and left-sided C2-C4 facet joints.    Past Medical History:   Diagnosis Date    Anxiety     Arthritis     Basal cell carcinoma 09/2016    right post auricular neck     Basal cell carcinoma of right forearm 04/19/2023    R lower forearm    Breast cancer 1992    right    Cataract     Fibromyalgia     History of measles as a child     Hyperlipidemia     Hypertension     Personal history of colonic polyps     Pneumonia     SCC (squamous cell carcinoma) 2015    R chest    SCC (squamous cell carcinoma) 2016    left medial shoulder    SCC (squamous cell carcinoma) 2017    left knee    SCC (squamous cell carcinoma) 2022    L upper chest, R upper arm KA types    Shingles     Squamous cell carcinoma 2015    right forearm    Thyroid disease     Vaginitis      Past Surgical History:   Procedure Laterality Date    ADENOIDECTOMY      ARTHROPLASTY, KNEE, TOTAL, USING COMPUTER-ASSISTED NAVIGATION Right 5/22/2023     Procedure: ARTHROPLASTY, KNEE, TOTAL, USING COMPUTER-ASSISTED NAVIGATION: QUYEN: RIGHT: MARTHA - TRIATHALON;  Surgeon: Milton Cobian III, MD;  Location: [facility] OR;  Service: Orthopedics;  Laterality: Right;    BREAST BIOPSY Left     Excisional bx, benign    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    CARPAL TUNNEL RELEASE Right 3/16/2021    Procedure: RELEASE, CARPAL TUNNEL;  Surgeon: Barbara Aldridge MD;  Location: [facility] OR;  Service: Orthopedics;  Laterality: Right;    CATARACT EXTRACTION W/  INTRAOCULAR LENS IMPLANT Bilateral     EPIDURAL STEROID INJECTION N/A 2/24/2023    Procedure: HEIDI C7-T1;  Surgeon: Andi Cisneros DO;  Location: [facility] OR;  Service: Pain Management;  Laterality: N/A;    EYE SURGERY      HAND SURGERY      INJECTION OF ANESTHETIC AGENT AROUND MEDIAL BRANCH NERVES INNERVATING CERVICAL FACET JOINT N/A 1/4/2022    Procedure: Cervical Medial Branch Block C5-6, C6-7 (No Sedation);  Surgeon: Himanshu Blcakmon Jr., MD;  Location: [facility] PAIN [facility]GT;  Service: Pain Management;  Laterality: N/A;    INJECTION OF ANESTHETIC AGENT AROUND MEDIAL BRANCH NERVES INNERVATING LUMBAR FACET JOINT Left 11/18/2021    Procedure: Cervical Medial Branch Block Left C5-6, C6-7 (IV Sedation);  Surgeon: Himanshu Blackmon Jr., MD;  Location: [facility]IN [facility];  Service: Pain Management;  Laterality: Left;    JOINT REPLACEMENT Right     ELZBIETA    KNEE ARTHROPLASTY Left 8/10/2020    Procedure: ARTHROPLASTY, KNEE-ADE NEXGEN/CEMENTED TIBIA;  Surgeon: Kalin Pacheco MD;  Location: [facility] OR;  Service: Orthopedics;  Laterality: Left;    RADIOFREQUENCY THERMAL COAGULATION OF MEDIAL BRANCH OF POSTERIOR RAMUS OF CERVICAL SPINAL NERVE Left 3/8/2022    Procedure: RADIOFREQUENCY THERMAL COAGULATION, NERVE, SPINAL, CERVICAL, LEFT C5-6 AND C6-7;  Surgeon: Himanshu Blackmon Jr., MD;  Location: [facility]IN [facility];  Service: Pain Management;  Laterality: Left;  NO PACEMAKER   ASA    thyriodectomy      partial    tonsillectomy      TONSILLECTOMY       TOTAL HIP ARTHROPLASTY      right    Yag  Left      Social History     Socioeconomic History    Marital status: Single   Occupational History     Employer: RETIRED   Tobacco Use    Smoking status: Never     Passive exposure: Never    Smokeless tobacco: Never   Substance and Sexual Activity    Alcohol use: Yes     Comment: socially - celebrations only    Drug use: Never    Sexual activity: Not Currently     Social Determinants of Health     Financial Resource Strain: Low Risk     Difficulty of Paying Living Expenses: Not hard at all   Food Insecurity: No Food Insecurity    Worried About Running Out of Food in the Last Year: Never true    Ran Out of Food in the Last Year: Never true   Transportation Needs: No Transportation Needs    Lack of Transportation (Medical): No    Lack of Transportation (Non-Medical): No   Stress: Stress Concern Present    Feeling of Stress : To some extent   Housing Stability: Unknown    Unable to Pay for Housing in the Last Year: No    Unstable Housing in the Last Year: No     Family History   Problem Relation Age of Onset    Breast cancer Mother     Cancer Mother     Stroke Paternal Grandfather     Heart disease Father     Cancer Paternal Aunt     Heart disease Paternal Aunt     Glaucoma Paternal Grandmother     Amblyopia Neg Hx     Blindness Neg Hx     Cataracts Neg Hx     Diabetes Neg Hx     Hypertension Neg Hx     Macular degeneration Neg Hx     Retinal detachment Neg Hx     Strabismus Neg Hx     Thyroid disease Neg Hx     Melanoma Neg Hx        Review of patient's allergies indicates:   Allergen Reactions    Sulfa (sulfonamide antibiotics) Rash    Clarithromycin Other (See Comments)     Weak, extreme fatigue. dizziness    Flexeril [cyclobenzaprine] Other (See Comments)     Dizziness    Iodine and iodide containing products     Lisinopril Other (See Comments)     Cough and sensation of throat swelling/?angioedema    Losartan Rash    Metoprolol Swelling     Tightness in throat     Spironolactone      Throat tightening    Tramadol Other (See Comments)     Dizzy and weak    Verapamil (bulk) Palpitations    Voltaren [diclofenac sodium] Other (See Comments)     Drops blood pressure       Current Outpatient Medications   Medication Sig    acetaminophen (TYLENOL) 650 MG TbSR Take 1 tablet (650 mg total) by mouth every 8 (eight) hours.    albuterol (PROVENTIL/VENTOLIN HFA) 90 mcg/actuation inhaler INHALE 1 TO 2 PUFFS BY MOUTH EVERY 6 HOURS AS NEEDED FOR WHEEZE    aliskiren (TEKTURNA) 300 MG Tab TAKE 1 TABLET BY MOUTH EVERY DAY    amLODIPine (NORVASC) 5 MG tablet Take 1 tablet (5 mg total) by mouth once daily.    aspirin (ECOTRIN) 81 MG EC tablet TAKE 1 TABLET BY MOUTH EVERY DAY    clotrimazole-betamethasone 1-0.05% (LOTRISONE) cream Apply topically once daily. To affected toenail.    coenzyme Q10 100 mg capsule Take 100 mg by mouth once daily.    glucosamine-chondroitin 500-400 mg tablet Take 1 tablet by mouth once daily.     labetaloL (NORMODYNE) 100 MG tablet TAKE 1.5 TABLETS (150 MG TOTAL) BY MOUTH EVERY 12 (TWELVE) HOURS.    lactase (LACTAID) 3,000 unit tablet Take 1 tablet by mouth 3 (three) times daily as needed.    LORazepam (ATIVAN) 0.5 MG tablet TAKE 1/2 TABLET TO 1 TABLET BY MOUTH EVERY 12 HOURS AS NEEDED FOR ANXIETY    multivitamin capsule Take 1 capsule by mouth once daily.    omeprazole (PRILOSEC OTC) 20 MG tablet Take 20 mg by mouth once daily.    pravastatin (PRAVACHOL) 80 MG tablet Take 1 tablet (80 mg total) by mouth once daily.    psyllium husk, aspartame, (METAMUCIL) 3.4 gram PwPk packet Take 1 packet by mouth once daily.    senna-docusate 8.6-50 mg (PERICOLACE) 8.6-50 mg per tablet Take 1 tablet by mouth 2 (two) times daily as needed for Constipation.    spironolactone (ALDACTONE) 25 MG tablet Take 1 tablet (25 mg total) by mouth 3 (three) times a week.    TURMERIC ORAL Take 500 mg by mouth once daily.     vitamin D (VITAMIN D3) 1000 units Tab Take 1,000 Units by mouth once  "daily.     No current facility-administered medications for this visit.       REVIEW OF SYSTEMS:    GENERAL:  No weight loss, malaise or fevers.  HEENT:   No recent changes in vision or hearing  NECK:  Negative for lumps, no difficulty with swallowing.  RESPIRATORY:  Negative for cough, wheezing or shortness of breath, patient denies any recent URI.  CARDIOVASCULAR:  Negative for chest pain, leg swelling or palpitations. + leg swelling   GI:  Negative for abdominal discomfort, blood in stools or black stools or change in bowel habits.  MUSCULOSKELETAL:  See HPI.  SKIN:  Negative for lesions, rash, and itching. + ALL  PSYCH:  No mood disorder or recent psychosocial stressors.  Patients sleep is not disturbed secondary to pain.  HEMATOLOGY/LYMPHOLOGY:  Negative for prolonged bleeding, bruising easily or swollen nodes.  Patient is not currently taking any anti-coagulants  NEURO:   No history of headaches, syncope, paralysis, seizures or tremors.  All other reviewed and negative other than HPI.    OBJECTIVE:    BP (!) 187/71 (BP Location: Left arm, Patient Position: Sitting)   Pulse (!) 57   Ht 5' 3" (1.6 m)   Wt 64 kg (141 lb 1.5 oz)   SpO2 100%   BMI 24.99 kg/m²     PHYSICAL EXAMINATION:    GENERAL: Well appearing, in no acute distress, alert and oriented x3.  PSYCH:  Mood and affect appropriate.  SKIN: Skin color, texture, turgor normal, no rashes or lesions.  HEAD/FACE:  Normocephalic, atraumatic. Cranial nerves grossly intact.    NECK:   - mild Positive pain to palpation over the cervical paraspinous muscles.   - Spurling  Negative.  - Positive pain with neck flexion, extension, or lateral flexion.     CV: RRR with palpation of the radial artery.  PULM: CTAB. No evidence of respiratory difficulty, symmetric chest rise.  GI:  Soft and non-tender.    MUSKULOSKELETAL:    Mild edema in both legs  Difficulty with right shoudler abduction and flexion. Cannot raise     NEURO: Bilateral upper and lower extremity " coordination and muscle stretch reflexes are physiologic and symmetric.      NEUROLOGICAL EXAM:  MENTAL STATUS: A x O x 3, good concentration, speech is fluent and goal directed  MEMORY: recent and remote are intact  CN: CN2-12 grossly intact  MOTOR: 5/5 in all muscle groups. Right shoulder abduction 2/5, shoulder flexion 1/5.   DTRs: 2+ intact symmetric  Sensation:    -no Loss of sensation in a left upper and right upper C-4, C-5, C-6, C-7, and C-8 bilaterally distribution.  Hicks: absent on the bilateral side(s)  Clonus: absent on the bilateral side(s)    GAIT: normal.

## 2023-07-19 ENCOUNTER — TELEPHONE (OUTPATIENT)
Dept: INTERNAL MEDICINE | Facility: CLINIC | Age: 88
End: 2023-07-19
Payer: MEDICARE

## 2023-07-19 ENCOUNTER — PATIENT MESSAGE (OUTPATIENT)
Dept: ADMINISTRATIVE | Facility: OTHER | Age: 88
End: 2023-07-19
Payer: MEDICARE

## 2023-07-19 ENCOUNTER — PATIENT MESSAGE (OUTPATIENT)
Dept: INTERNAL MEDICINE | Facility: CLINIC | Age: 88
End: 2023-07-19
Payer: MEDICARE

## 2023-07-19 NOTE — TELEPHONE ENCOUNTER
----- Message from Criselda Quintana sent at 7/19/2023  4:27 PM CDT -----  Contact: [phone]  Pt is asking if the dr can call her she states she is needing to speak to you in regards to her blood pressure she states she sent a message Monday and she has had no response  please give return call so she can see what medication is better for her to take

## 2023-07-20 ENCOUNTER — CLINICAL SUPPORT (OUTPATIENT)
Dept: REHABILITATION | Facility: HOSPITAL | Age: 88
End: 2023-07-20
Payer: MEDICARE

## 2023-07-20 DIAGNOSIS — R26.2 DIFFICULTY WALKING: Primary | ICD-10-CM

## 2023-07-20 PROCEDURE — 97110 THERAPEUTIC EXERCISES: CPT | Mod: PO

## 2023-07-20 PROCEDURE — 97140 MANUAL THERAPY 1/> REGIONS: CPT | Mod: PO

## 2023-07-20 NOTE — PROGRESS NOTES
[facility]   Physical Therapy Treatment Note      Name: Gerda Lai  Clinic Number: [id number]    Therapy Diagnosis:   Encounter Diagnosis   Name Primary?    Difficulty walking Yes     Physician: Milton Cobian III, *    Visit Date: 7/20/2023    Physician Orders: PT Eval and Treat, R knee Note:    Eval and treat with modalities: s/p R TKA, focus on general gait training/balance/stability, does not need aggressive R TKA rehab (would normally be done with knee PT) 2x/week x4 weeks  Medical Diagnosis from Referral: Status post right knee replacement [Z96.651]  Evaluation Date: 7/12/2023  Authorization Period Expiration: 7/5/2024  Plan of Care Expiration: 10/12/2024  Progress Note Due: 8/12/2023   Visit # / Visits authorized: 1/ 40   FOTO: 1/3     Precautions: Standard  and R TKA      Time In: 3:02  Time Out: 3:55  Total Billable Time: 53 minutes    PTA Visit #: 0/5       Subjective     Pt reports: She states she is doing good. She states the exercises are very mild.   She was compliant with home exercise program.    SP RIGHT TKA; DOS 5/22/2023     Response to previous treatment: good response   Functional change: ongoing    Pain: 1/10  Location: right Lateral thigh      Objective      Objective Measures updated at progress report unless specified.     Observation:  Gait: SPC, decreased speed, decreased stride            Functional tests:   Tug test: 18.67s, SPC   5 x sit to stand test: 17.58s, Mod UE usage         Range of Motion (Active):   Knee Right  Left    Flexion 110 (mild p) 123   Extension -3 -3      Quad set Right; mild limitations vs L      Joint Mobility:      LEFT  PF 3/6 ; except sup 2/6  RIGHT  PF 3/6; except sup 2/6      LEFT   TF post 2/6, ant 3/6  RIGHT   TF post 2/6, ant 2/6       Palpation: R knee incision anterior healing clean and normal. No ttp R knee. Neg DF sign. Post gastroc no ttp, no warmth. Scar mob 3/6.      Sensation: gross intact      Edema: mild edema  noted R knee            Intake Outcome Measure for FOTO knee Survey     Therapist reviewed FOTO scores for Gerda Lai on 7/12/2023.   FOTO documents entered into [employer] - see Media section.     Intake Score: complete at another time %          Treatment     Gerda received the treatments listed below:      therapeutic exercises to develop strength, endurance, ROM, flexibility, posture, and core stabilization for 38 minutes including:    Quad set 5s x 2 x 5   Heel slide supine 2 x 10   SLR 5s x  2 x 5   Seated laq 2 x 10   Recumbent bike full revolutions 8 minutes, 00 resistance  Mini squats (UE supported) 3 x 10, SBA   Weight shifting sagittal and frontal plane 2 x 10, SBA        manual therapy techniques: Joint mobilizations and Soft tissue Mobilization were applied to the: R KNEE for 15 minutes, including:    R Knee PROM/aarom   PF mobs grade I.II. sup/inf   PF mobs post glide grade I.II.        Patient Education and Home Exercises       Education provided:   - HEP verbalized    Written Home Exercises Provided: Patient instructed to cont prior HEP. Exercises were reviewed and Gerda was able to demonstrate them prior to the end of the session.  Gerda demonstrated good  understanding of the education provided. See EMR under Patient Instructions for exercises provided during therapy sessions    Assessment     Pt demonstrates positive carry over between visits with improving R knee AROM. Pt was educated on progressive functional exercises and tolerated well. Pt's Knee AROM is overall equalized. Pt will benefit from progressive balance and stability training. Plan to progress within protocol. R knee AROM 0-122 degrees.       Gerda Is progressing well towards her goals.   Pt prognosis is Excellent.     Pt will continue to benefit from skilled outpatient physical therapy to address the deficits listed in the problem list box on initial evaluation, provide pt/family education and to maximize pt's level of  independence in the home and community environment.     Pt's spiritual, cultural and educational needs considered and pt agreeable to plan of care and goals.     Anticipated barriers to physical therapy: none      Goals:  Short Term Goals:   Pt demonstrate indep with initial hep 4 weeks   Pt demonstrate equalized knee arom to normalize gait  4 weeks      Long Term Goals:  Pt demonstrate tug test 13 sec or less to decrease fall risk  8 weeks   Pt demonstrate 5 x sit to stand 13 secs or less to improve LE strength for community ambulation  8 weeks     Plan     Plan of care Certification: 7/12/2023 to 10/12/2023.     Outpatient Physical Therapy 2 times weekly for 6 weeks to include the following interventions: Gait Training, Manual Therapy, Moist Heat/ Ice, Neuromuscular Re-ed, Patient Education, Self Care, Therapeutic Activities, and Therapeutic Exercise.    Carlos Bravo, PT

## 2023-07-24 ENCOUNTER — TELEPHONE (OUTPATIENT)
Dept: DERMATOLOGY | Facility: CLINIC | Age: 88
End: 2023-07-24
Payer: MEDICARE

## 2023-07-24 ENCOUNTER — CLINICAL SUPPORT (OUTPATIENT)
Dept: REHABILITATION | Facility: HOSPITAL | Age: 88
End: 2023-07-24
Payer: MEDICARE

## 2023-07-24 DIAGNOSIS — R26.2 DIFFICULTY WALKING: Primary | ICD-10-CM

## 2023-07-24 PROCEDURE — 97110 THERAPEUTIC EXERCISES: CPT | Mod: PO

## 2023-07-24 PROCEDURE — 97140 MANUAL THERAPY 1/> REGIONS: CPT | Mod: PO

## 2023-07-24 NOTE — TELEPHONE ENCOUNTER
Hi, unfortunately  is out of the office on Mondays moving forward. We've rescheduled your appointment to Friday 09/15/23. If the day and time do not work, please contact our office at [phone]. I have been unable to reach you by phone.        Thank you,  Marjan

## 2023-07-24 NOTE — PROGRESS NOTES
[facility]   Physical Therapy Treatment Note      Name: Gerda Lai  Clinic Number: [id number]    Therapy Diagnosis:   Encounter Diagnosis   Name Primary?    Difficulty walking Yes     Physician: Milton Cobian III, *    Visit Date: 7/24/2023    Physician Orders: PT Eval and Treat, R knee Note:    Eval and treat with modalities: s/p R TKA, focus on general gait training/balance/stability, does not need aggressive R TKA rehab (would normally be done with knee PT) 2x/week x4 weeks  Medical Diagnosis from Referral: Status post right knee replacement [Z96.651]  Evaluation Date: 7/12/2023  Authorization Period Expiration: 7/5/2024  Plan of Care Expiration: 10/12/2024  Progress Note Due: 8/12/2023   Visit # / Visits authorized: 2/ 40   FOTO: 1/3     Precautions: Standard  and R TKA      Time In: 2:05 (late arrival)  Time Out: 2:58  Total Billable Time: 53 minutes    PTA Visit #: 0/5       Subjective     Pt reports: she is doing good.   She was compliant with home exercise program.    SP RIGHT TKA; DOS 5/22/2023     Response to previous treatment: good response   Functional change: ongoing    Pain: 0/10  Location: right Lateral thigh      Objective      Objective Measures updated at progress report unless specified.     Observation:  Gait: SPC, decreased speed, decreased stride            Functional tests:   Tug test: 18.67s, SPC   5 x sit to stand test: 17.58s, Mod UE usage         Range of Motion (Active):   Knee Right  Left    Flexion 110 (mild p) 123   Extension -3 -3      Quad set Right; mild limitations vs L      Joint Mobility:      LEFT  PF 3/6 ; except sup 2/6  RIGHT  PF 3/6; except sup 2/6      LEFT   TF post 2/6, ant 3/6  RIGHT   TF post 2/6, ant 2/6       Palpation: R knee incision anterior healing clean and normal. No ttp R knee. Neg DF sign. Post gastroc no ttp, no warmth. Scar mob 3/6.      Sensation: gross intact      Edema: mild edema noted R knee            Intake Outcome  Measure for FOTO knee Survey     Therapist reviewed FOTO scores for Gerda Lai on 7/12/2023.   FOTO documents entered into [employer] - see Media section.     Intake Score: complete at another time %          Treatment     Gerda received the treatments listed below:      therapeutic exercises to develop strength, endurance, ROM, flexibility, posture, and core stabilization for 38 minutes including:    Seated laq 2 x 10   Recumbent bike full revolutions 8 minutes, 4.0 resistance  Mini squats (UE supported) 3 x 10, SBA   Weight shifting sagittal and frontal plane 2 x 10, SBA    Heel raises 2 x 10, UE supported       manual therapy techniques: Joint mobilizations and Soft tissue Mobilization were applied to the: R KNEE for 15 minutes, including:    R Knee PROM/aarom   PF mobs grade I.II. sup/inf           Patient Education and Home Exercises       Education provided:   - HEP verbalized    Written Home Exercises Provided: Patient instructed to cont prior HEP. Exercises were reviewed and Gerda was able to demonstrate them prior to the end of the session.  Gerda demonstrated good  understanding of the education provided. See EMR under Patient Instructions for exercises provided during therapy sessions    Assessment       Pt presents today with mild ecchymosis on right anterior shin, 0/10 pain. Pt is unsure of mechanism. It's presentation appears likely due to bumping her shin. Pt is progressing well with physical therapy with improving R knee AROM and strength. Plan to progress within surgical protocol with emphasis on balance and gait.       Gerda Is progressing well towards her goals.   Pt prognosis is Excellent.     Pt will continue to benefit from skilled outpatient physical therapy to address the deficits listed in the problem list box on initial evaluation, provide pt/family education and to maximize pt's level of independence in the home and community environment.     Pt's spiritual, cultural and educational  needs considered and pt agreeable to plan of care and goals.     Anticipated barriers to physical therapy: none      Goals:  Short Term Goals:   Pt demonstrate indep with initial hep 4 weeks   Pt demonstrate equalized knee arom to normalize gait  4 weeks      Long Term Goals:  Pt demonstrate tug test 13 sec or less to decrease fall risk  8 weeks   Pt demonstrate 5 x sit to stand 13 secs or less to improve LE strength for community ambulation  8 weeks     Plan     Plan of care Certification: 7/12/2023 to 10/12/2023.     Outpatient Physical Therapy 2 times weekly for 6 weeks to include the following interventions: Gait Training, Manual Therapy, Moist Heat/ Ice, Neuromuscular Re-ed, Patient Education, Self Care, Therapeutic Activities, and Therapeutic Exercise.    Carlos Bravo, PT

## 2023-07-26 ENCOUNTER — CLINICAL SUPPORT (OUTPATIENT)
Dept: REHABILITATION | Facility: HOSPITAL | Age: 88
End: 2023-07-26
Payer: MEDICARE

## 2023-07-26 ENCOUNTER — TELEPHONE (OUTPATIENT)
Dept: DERMATOLOGY | Facility: CLINIC | Age: 88
End: 2023-07-26
Payer: MEDICARE

## 2023-07-26 DIAGNOSIS — R26.2 DIFFICULTY WALKING: Primary | ICD-10-CM

## 2023-07-26 PROCEDURE — 97140 MANUAL THERAPY 1/> REGIONS: CPT | Mod: PO

## 2023-07-26 PROCEDURE — 97110 THERAPEUTIC EXERCISES: CPT | Mod: PO

## 2023-07-26 NOTE — PROGRESS NOTES
[facility]   Physical Therapy Treatment Note      Name: Gerda Lai  Clinic Number: [id number]    Therapy Diagnosis:   Encounter Diagnosis   Name Primary?    Difficulty walking Yes     Physician: Milton Cobian III, *    Visit Date: 7/26/2023    Physician Orders: PT Eval and Treat, R knee Note:    Eval and treat with modalities: s/p R TKA, focus on general gait training/balance/stability, does not need aggressive R TKA rehab (would normally be done with knee PT) 2x/week x4 weeks  Medical Diagnosis from Referral: Status post right knee replacement [Z96.651]  Evaluation Date: 7/12/2023  Authorization Period Expiration: 7/5/2024  Plan of Care Expiration: 10/12/2024  Progress Note Due: 8/12/2023   Visit # / Visits authorized: 3/ 40   FOTO: 1/3     Precautions: Standard  and R TKA      Time In: 300   Time Out: 3:53  Total Billable Time: 53 minutes    PTA Visit #: 0/5       Subjective     Pt reports: She is doing good and she believes in rehab.  She was compliant with home exercise program.    SP RIGHT TKA; DOS 5/22/2023     Response to previous treatment: good response   Functional change: ongoing    Pain: 0/10  Location: right Lateral thigh      Objective      Objective Measures updated at progress report unless specified.     Observation:  Gait: SPC, decreased speed, decreased stride            Functional tests:   Tug test: 18.67s, SPC   5 x sit to stand test: 17.58s, Mod UE usage         Range of Motion (Active):   Knee Right  Left    Flexion 110 (mild p) 123   Extension -3 -3      Quad set Right; mild limitations vs L      Joint Mobility:      LEFT  PF 3/6 ; except sup 2/6  RIGHT  PF 3/6; except sup 2/6      LEFT   TF post 2/6, ant 3/6  RIGHT   TF post 2/6, ant 2/6       Palpation: R knee incision anterior healing clean and normal. No ttp R knee. Neg DF sign. Post gastroc no ttp, no warmth. Scar mob 3/6.      Sensation: gross intact      Edema: mild edema noted R knee            Intake  Outcome Measure for FOTO knee Survey     Therapist reviewed FOTO scores for Gerda Lai on 7/12/2023.   FOTO documents entered into [employer] - see Media section.     Intake Score: complete at another time %          Treatment     Gerda received the treatments listed below:      therapeutic exercises to develop strength, endurance, ROM, flexibility, posture, and core stabilization for 40 minutes including:    Seated laq 2 x 10  not today   Recumbent bike full revolutions 8 minutes, 2.0 resistance  Mini squats (UE supported) 3 x 10, SBA   Weight shifting sagittal and frontal plane 2 x 10, SBA    Heel raises 2 x 10, UE supported  Standing hamstring curl 10x (UE supported)       manual therapy techniques: Joint mobilizations and Soft tissue Mobilization were applied to the: R KNEE for 13 minutes, including:    R Knee PROM/aarom   PF mobs grade I.II. sup/inf           Patient Education and Home Exercises       Education provided:   - HEP verbalized    Written Home Exercises Provided: Patient instructed to cont prior HEP. Exercises were reviewed and Gerda was able to demonstrate them prior to the end of the session.  Gerda demonstrated good  understanding of the education provided. See EMR under Patient Instructions for exercises provided during therapy sessions    Assessment     Pt demonstrates positive carry over with improving R knee AROM and strength. R KNEE AROM 0-118 degrees noted today. Pt presents with R anterior shin bruise which appears to be improving. Pt will benefit from progressive balance and LE strengthening to improve walking tolerance. Plan to progress within surgical protocol.       Gerda Is progressing well towards her goals.   Pt prognosis is Excellent.     Pt will continue to benefit from skilled outpatient physical therapy to address the deficits listed in the problem list box on initial evaluation, provide pt/family education and to maximize pt's level of independence in the home and community  environment.     Pt's spiritual, cultural and educational needs considered and pt agreeable to plan of care and goals.     Anticipated barriers to physical therapy: none      Goals:  Short Term Goals:   Pt demonstrate indep with initial hep 4 weeks   Pt demonstrate equalized knee arom to normalize gait  4 weeks      Long Term Goals:  Pt demonstrate tug test 13 sec or less to decrease fall risk  8 weeks   Pt demonstrate 5 x sit to stand 13 secs or less to improve LE strength for community ambulation  8 weeks     Plan     Plan of care Certification: 7/12/2023 to 10/12/2023.     Outpatient Physical Therapy 2 times weekly for 6 weeks to include the following interventions: Gait Training, Manual Therapy, Moist Heat/ Ice, Neuromuscular Re-ed, Patient Education, Self Care, Therapeutic Activities, and Therapeutic Exercise.    Carlos Bravo, PT

## 2023-07-26 NOTE — TELEPHONE ENCOUNTER
Hi, unfortunately  is out of the office on Mondays moving forward. We've rescheduled your appointment to Friday 09/15/23. If the day and time do not work, please contact our office at [phone]. I have been unable to reach you by phone.        Thank you,  Marjan

## 2023-07-31 ENCOUNTER — CLINICAL SUPPORT (OUTPATIENT)
Dept: REHABILITATION | Facility: HOSPITAL | Age: 88
End: 2023-07-31
Payer: MEDICARE

## 2023-07-31 DIAGNOSIS — R26.2 DIFFICULTY WALKING: Primary | ICD-10-CM

## 2023-07-31 PROCEDURE — 97110 THERAPEUTIC EXERCISES: CPT | Mod: PO

## 2023-07-31 PROCEDURE — 97140 MANUAL THERAPY 1/> REGIONS: CPT | Mod: PO

## 2023-07-31 NOTE — PROGRESS NOTES
[facility]   Physical Therapy Treatment Note      Name: Gerda Lai  Clinic Number: [id number]    Therapy Diagnosis:   Encounter Diagnosis   Name Primary?    Difficulty walking Yes       Physician: Milton Cobian III, *    Visit Date: 7/31/2023    Physician Orders: PT Eval and Treat, R knee Note:    Eval and treat with modalities: s/p R TKA, focus on general gait training/balance/stability, does not need aggressive R TKA rehab (would normally be done with knee PT) 2x/week x4 weeks  Medical Diagnosis from Referral: Status post right knee replacement [Z96.651]  Evaluation Date: 7/12/2023  Authorization Period Expiration: 7/5/2024  Plan of Care Expiration: 10/12/2024  Progress Note Due: 8/12/2023   Visit # / Visits authorized: 4/ 40   FOTO: 1/3     Precautions: Standard  and R TKA      Time In: 2:10  Time Out: 2:55  Total Billable Time: 45 minutes    PTA Visit #: 0/5       Subjective     Pt reports: she is doing good. She has stiffness in both her knees.   She was compliant with home exercise program.    SP RIGHT TKA; DOS 5/22/2023     Response to previous treatment: good response   Functional change: ongoing    Pain: 0/10  Location: right Lateral thigh      Objective      Objective Measures updated at progress report unless specified.     Observation:  Gait: SPC, decreased speed, decreased stride            Functional tests:   Tug test: 18.67s, SPC   5 x sit to stand test: 17.58s, Mod UE usage         Range of Motion (Active):   Knee Right  Left    Flexion 110 (mild p) 123   Extension -3 -3      Quad set Right; mild limitations vs L      Joint Mobility:      LEFT  PF 3/6 ; except sup 2/6  RIGHT  PF 3/6; except sup 2/6      LEFT   TF post 2/6, ant 3/6  RIGHT   TF post 2/6, ant 2/6       Palpation: R knee incision anterior healing clean and normal. No ttp R knee. Neg DF sign. Post gastroc no ttp, no warmth. Scar mob 3/6.      Sensation: gross intact      Edema: mild edema noted R knee             Intake Outcome Measure for FOTO knee Survey     Therapist reviewed FOTO scores for Gerda Lai on 7/12/2023.   FOTO documents entered into [email or web address] - see Media section.     Intake Score: complete at another time %          Treatment     Gerda received the treatments listed below:      therapeutic exercises to develop strength, endurance, ROM, flexibility, posture, and core stabilization for 35 minutes including:    Seated laq 2 x 10  not today   Recumbent bike full revolutions 8 minutes, 00 resistance  Mini squats (UE supported) 3 x 10, SBA   Weight shifting sagittal and frontal plane 2 x 10, SBA    Heel raises 2 x 10, UE supported  Standing hamstring curl 10x (UE supported)  NBOS balance, eyes open 2 x 10 s  Firm, SBA   Staggered balance eyes open 2 x 10s  Firm , SBA        manual therapy techniques: Joint mobilizations and Soft tissue Mobilization were applied to the: R KNEE for 10 minutes, including:    R Knee PROM/aarom   PF mobs grade I.II. sup/inf           Patient Education and Home Exercises       Education provided:   - HEP verbalized    Written Home Exercises Provided: Patient instructed to cont prior HEP. Exercises were reviewed and Gerda was able to demonstrate them prior to the end of the session.  Gerda demonstrated good  understanding of the education provided. See EMR under Patient Instructions for exercises provided during therapy sessions    Assessment     Pt demonstrates good response to current interventions. Pt is happy with her progress with physical therapy. Pt demonstrates decreased balance with staggered stance and will benefit from progressive training in this area to improve her stability. Pt required therapist correction during trial of staggered balance d/t loss of balance. Plan to progress within surgical protocol with emphasis on LE balance and stability.       Gerda Is progressing well towards her goals.   Pt prognosis is Excellent.     Pt will continue to benefit from  skilled outpatient physical therapy to address the deficits listed in the problem list box on initial evaluation, provide pt/family education and to maximize pt's level of independence in the home and community environment.     Pt's spiritual, cultural and educational needs considered and pt agreeable to plan of care and goals.     Anticipated barriers to physical therapy: none      Goals:  Short Term Goals:   Pt demonstrate indep with initial hep 4 weeks   Pt demonstrate equalized knee arom to normalize gait  4 weeks      Long Term Goals:  Pt demonstrate tug test 13 sec or less to decrease fall risk  8 weeks   Pt demonstrate 5 x sit to stand 13 secs or less to improve LE strength for community ambulation  8 weeks     Plan     Plan of care Certification: 7/12/2023 to 10/12/2023.     Outpatient Physical Therapy 2 times weekly for 6 weeks to include the following interventions: Gait Training, Manual Therapy, Moist Heat/ Ice, Neuromuscular Re-ed, Patient Education, Self Care, Therapeutic Activities, and Therapeutic Exercise.    Carlos Bravo, PT

## 2023-08-02 ENCOUNTER — CLINICAL SUPPORT (OUTPATIENT)
Dept: REHABILITATION | Facility: HOSPITAL | Age: 88
End: 2023-08-02
Payer: MEDICARE

## 2023-08-02 DIAGNOSIS — R26.2 DIFFICULTY WALKING: Primary | ICD-10-CM

## 2023-08-02 PROCEDURE — 97110 THERAPEUTIC EXERCISES: CPT | Mod: PO

## 2023-08-02 PROCEDURE — 97140 MANUAL THERAPY 1/> REGIONS: CPT | Mod: PO

## 2023-08-02 NOTE — PROGRESS NOTES
[facility]   Physical Therapy Treatment Note      Name: Gerda Lai  Clinic Number: [id number]    Therapy Diagnosis:   Encounter Diagnosis   Name Primary?    Difficulty walking Yes       Physician: Milton Cobian III, *    Visit Date: 8/2/2023    Physician Orders: PT Eval and Treat, R knee Note:    Eval and treat with modalities: s/p R TKA, focus on general gait training/balance/stability, does not need aggressive R TKA rehab (would normally be done with knee PT) 2x/week x4 weeks  Medical Diagnosis from Referral: Status post right knee replacement [Z96.651]  Evaluation Date: 7/12/2023  Authorization Period Expiration: 7/5/2024  Plan of Care Expiration: 10/12/2024  Progress Note Due: 8/12/2023   Visit # / Visits authorized: 5/ 40   FOTO: 1/3     Precautions: Standard  and R TKA      Time In: 0300 (late check-in)  Time Out: 3:55  Total Billable Time: 45 minutes    PTA Visit #: 0/5       Subjective     Pt reports: stiffness in her knees are still there. She is doing good. Denies pain.   She was compliant with home exercise program.    SP RIGHT TKA; DOS 5/22/2023     Response to previous treatment: good response   Functional change: ongoing    Pain: 0/10  Location: right Lateral thigh      Objective      Objective Measures updated at progress report unless specified.     Observation:  Gait: SPC, decreased speed, decreased stride            Functional tests:   Tug test: 18.67s, SPC   5 x sit to stand test: 17.58s, Mod UE usage         Range of Motion (Active):   Knee Right  Left    Flexion 110 (mild p) 123   Extension -3 -3      Quad set Right; mild limitations vs L      Joint Mobility:      LEFT  PF 3/6 ; except sup 2/6  RIGHT  PF 3/6; except sup 2/6      LEFT   TF post 2/6, ant 3/6  RIGHT   TF post 2/6, ant 2/6       Palpation: R knee incision anterior healing clean and normal. No ttp R knee. Neg DF sign. Post gastroc no ttp, no warmth. Scar mob 3/6.      Sensation: gross intact       Edema: mild edema noted R knee            Intake Outcome Measure for FOTO knee Survey     Therapist reviewed FOTO scores for Gerda Lai on 7/12/2023.   FOTO documents entered into [employer] - see Media section.     Intake Score: complete at another time %          Treatment     Gerda received the treatments listed below:      **patient was one 1-1 with physical therapist for 40 minutes    therapeutic exercises to develop strength, endurance, ROM, flexibility, posture, and core stabilization for 40 minutes including:    Seated laq 2 x 10  not today   Recumbent bike full revolutions 10 minutes, 00 resistance  Mini squats (UE supported) 3 x 10, SBA   Weight shifting sagittal and frontal plane 2 x 10, SBA    Heel raises 2 x 10, UE supported  Standing hamstring curl 10x (UE supported)  NBOS balance, eyes open 2 x 10 s  Firm, SBA   Staggered balance eyes open 2 x 10s  Firm , SBA        manual therapy techniques: Joint mobilizations and Soft tissue Mobilization were applied to the: R KNEE for 15 minutes, including:    R Knee PROM/aarom   PF mobs grade I.II. sup/inf       Patient Education and Home Exercises       Education provided:   - HEP verbalized    Written Home Exercises Provided: Patient instructed to cont prior HEP. Exercises were reviewed and Gerda was able to demonstrate them prior to the end of the session.  Gerda demonstrated good  understanding of the education provided. See EMR under Patient Instructions for exercises provided during therapy sessions    Assessment     Pt demonstrates good response to current interventions. Pt demonstrates overall improving balance with progressive therapeutic exercise. Pt is reliant on SPC for ambulation. Plan to progress with emphasis on balance and LE strengthening within surgical protocol.       Gedra Is progressing well towards her goals.   Pt prognosis is Excellent.     Pt will continue to benefit from skilled outpatient physical therapy to address the deficits  listed in the problem list box on initial evaluation, provide pt/family education and to maximize pt's level of independence in the home and community environment.     Pt's spiritual, cultural and educational needs considered and pt agreeable to plan of care and goals.     Anticipated barriers to physical therapy: none      Goals:  Short Term Goals:   Pt demonstrate indep with initial hep 4 weeks   Pt demonstrate equalized knee arom to normalize gait  4 weeks      Long Term Goals:  Pt demonstrate tug test 13 sec or less to decrease fall risk  8 weeks   Pt demonstrate 5 x sit to stand 13 secs or less to improve LE strength for community ambulation  8 weeks     Plan     Plan of care Certification: 7/12/2023 to 10/12/2023.     Outpatient Physical Therapy 2 times weekly for 6 weeks to include the following interventions: Gait Training, Manual Therapy, Moist Heat/ Ice, Neuromuscular Re-ed, Patient Education, Self Care, Therapeutic Activities, and Therapeutic Exercise.    Carlos Bravo, PT

## 2023-08-04 DIAGNOSIS — I10 HYPERTENSION, ESSENTIAL: Chronic | ICD-10-CM

## 2023-08-04 NOTE — TELEPHONE ENCOUNTER
No care due was identified.  [facility] Embedded Care Due Messages. Reference number: [id number].   8/04/2023 4:19:09 PM CDT

## 2023-08-05 RX ORDER — LORAZEPAM 0.5 MG/1
TABLET ORAL
Qty: 30 TABLET | Refills: 0 | Status: SHIPPED | OUTPATIENT
Start: 2023-08-05 | End: 2023-10-13

## 2023-08-07 ENCOUNTER — CLINICAL SUPPORT (OUTPATIENT)
Dept: REHABILITATION | Facility: HOSPITAL | Age: 88
End: 2023-08-07
Payer: MEDICARE

## 2023-08-07 DIAGNOSIS — R26.2 DIFFICULTY WALKING: Primary | ICD-10-CM

## 2023-08-07 PROCEDURE — 97110 THERAPEUTIC EXERCISES: CPT | Mod: PO

## 2023-08-07 NOTE — PROGRESS NOTES
[facility]   Physical Therapy Treatment Note      Name: Gerda Lai  Clinic Number: [id number]    Therapy Diagnosis:   Encounter Diagnosis   Name Primary?    Difficulty walking Yes         Physician: Milton Cobian III, *    Visit Date: 8/7/2023    Physician Orders: PT Eval and Treat, R knee Note:    Eval and treat with modalities: s/p R TKA, focus on general gait training/balance/stability, does not need aggressive R TKA rehab (would normally be done with knee PT) 2x/week x4 weeks  Medical Diagnosis from Referral: Status post right knee replacement [Z96.651]  Evaluation Date: 7/12/2023  Authorization Period Expiration: 7/5/2024  Plan of Care Expiration: 10/12/2024  Progress Note Due: 8/12/2023   Visit # / Visits authorized: 6/ 40   FOTO: 1/3     Precautions: Standard  and R TKA      Time In: 2:08  Time Out: 2:48  Total Billable Time: 40 minutes    PTA Visit #: 0/5       Subjective     Pt reports: she is walking much better. She feels stronger on her feet. Denies knee pain.   She was compliant with home exercise program.    SP RIGHT TKA; DOS 5/22/2023     Response to previous treatment: good response   Functional change: ongoing    Pain: 0/10  Location: right Lateral thigh      Objective      Objective Measures updated at progress report unless specified.     Observation:  Gait: SPC, decreased speed, decreased stride            Functional tests:   Tug test: 18.67s, SPC   5 x sit to stand test: 17.58s, Mod UE usage         Range of Motion (Active):   Knee Right  Left    Flexion 110 (mild p) 123   Extension -3 -3      Quad set Right; mild limitations vs L      Joint Mobility:      LEFT  PF 3/6 ; except sup 2/6  RIGHT  PF 3/6; except sup 2/6      LEFT   TF post 2/6, ant 3/6  RIGHT   TF post 2/6, ant 2/6       Palpation: R knee incision anterior healing clean and normal. No ttp R knee. Neg DF sign. Post gastroc no ttp, no warmth. Scar mob 3/6.      Sensation: gross intact      Edema: mild  edema noted R knee            Intake Outcome Measure for FOTO knee Survey     Therapist reviewed FOTO scores for Gerda Lai on 7/12/2023.   FOTO documents entered into [employer] - see Media section.     Intake Score: complete at another time %          Treatment     Gerda received the treatments listed below:      therapeutic exercises to develop strength, endurance, ROM, flexibility, posture, and core stabilization for 40 minutes including:      Recumbent bike full revolutions 8 minutes, 00 resistance  Mini squats (UE supported) 3 x 10, SBA   Weight shifting sagittal and frontal plane 2 x 10, SBA    Heel raises 2 x 10, UE supported  Standing hamstring curl 2x10 (UE supported)  NBOS balance, eyes open 2 x 20 s  airex, CGA   Staggered balance eyes open 2 x 20s  airex , CGA          Patient Education and Home Exercises       Education provided:   - HEP verbalized    Written Home Exercises Provided: Patient instructed to cont prior HEP. Exercises were reviewed and Gerda was able to demonstrate them prior to the end of the session.  Gerda demonstrated good  understanding of the education provided. See EMR under Patient Instructions for exercises provided during therapy sessions    Assessment     Pt demonstrates good response to current interventions. She reports increased confidence with walking and increased LE strength. Balance on compliant surface was initiated today and pt was moderately challenged. Pt will benefit from progressive balance and LE strengthening within surgical protocol to improve her walking tolerance.       Gerda Is progressing well towards her goals.   Pt prognosis is Excellent.     Pt will continue to benefit from skilled outpatient physical therapy to address the deficits listed in the problem list box on initial evaluation, provide pt/family education and to maximize pt's level of independence in the home and community environment.     Pt's spiritual, cultural and educational needs  considered and pt agreeable to plan of care and goals.     Anticipated barriers to physical therapy: none      Goals:  Short Term Goals:   Pt demonstrate indep with initial hep 4 weeks   Pt demonstrate equalized knee arom to normalize gait  4 weeks      Long Term Goals:  Pt demonstrate tug test 13 sec or less to decrease fall risk  8 weeks   Pt demonstrate 5 x sit to stand 13 secs or less to improve LE strength for community ambulation  8 weeks     Plan     Next visit:   Progress note   Consider lower level step ups   Consider side stepping // bars       Plan of care Certification: 7/12/2023 to 10/12/2023.     Outpatient Physical Therapy 2 times weekly for 6 weeks to include the following interventions: Gait Training, Manual Therapy, Moist Heat/ Ice, Neuromuscular Re-ed, Patient Education, Self Care, Therapeutic Activities, and Therapeutic Exercise.    Carlos Bravo, PT

## 2023-08-09 ENCOUNTER — CLINICAL SUPPORT (OUTPATIENT)
Dept: REHABILITATION | Facility: HOSPITAL | Age: 88
End: 2023-08-09
Payer: MEDICARE

## 2023-08-09 DIAGNOSIS — R26.2 DIFFICULTY WALKING: Primary | ICD-10-CM

## 2023-08-09 PROCEDURE — 97110 THERAPEUTIC EXERCISES: CPT | Mod: PO

## 2023-08-09 PROCEDURE — 97530 THERAPEUTIC ACTIVITIES: CPT | Mod: PO

## 2023-08-09 NOTE — PROGRESS NOTES
[facility]   Physical Therapy Treatment / Progress Note      Name: Gerda Lai  Clinic Number: [id number]    Therapy Diagnosis:   Encounter Diagnosis   Name Primary?    Difficulty walking Yes         Physician: Milton Cobian III, *    Visit Date: 8/9/2023    Physician Orders: PT Eval and Treat, R knee Note:    Eval and treat with modalities: s/p R TKA, focus on general gait training/balance/stability, does not need aggressive R TKA rehab (would normally be done with knee PT) 2x/week x4 weeks  Medical Diagnosis from Referral: Status post right knee replacement [Z96.651]  Evaluation Date: 7/12/2023  Authorization Period Expiration: 7/5/2024  Plan of Care Expiration: 10/12/2024  Progress Note Due: 9/9/2023   Visit # / Visits authorized: 7/ 40   FOTO: 2/3     Precautions: Standard  and R TKA      Time In: 3:03p   Time Out: 3:58  Total Billable Time: 55 minutes    PTA Visit #: 0/5       Subjective     Pt reports: she is making good progress. Denies any concerns. Denies knee pain.   She was compliant with home exercise program.    SP RIGHT TKA; DOS 5/22/2023     Response to previous treatment: good response   Functional change: ongoing    Pain: 0/10  Location: right Lateral thigh      Objective      Objective Measures updated at progress report unless specified.   Updated 8/9/2023     Observation:  Gait: SPC, decreased speed, decreased stride         Functional tests:   Tug test: 17.67s, SPC   5 x sit to stand test: 16.63s, Mod UE usage         Range of Motion (Active):   Knee Right  Left    Flexion 115 123   Extension 0 0      Quad activation, right full tetany equal to L knee      Joint Mobility:      LEFT  PF 3/6 ; except sup 2/6  RIGHT  PF 3/6      LEFT   TF post 2/6, ant 3/6  RIGHT   TF post 2/6, ant 3/6       Palpation: R knee incision healing clean and normal     Sensation: gross intact  R LE      Edema: mild edema noted R knee            Intake Outcome Measure for FOTO knee  Survey     Therapist reviewed FOTO scores for Gerda Lai on 8/9/2023.   FOTO documents entered into [employer] - see Media section.     Intake Score: 86%          Treatment     Gerda received the treatments listed below:      therapeutic exercises to develop strength, endurance, ROM, flexibility, posture, and core stabilization for 25 minutes including:    BOLD exercises =Performed today       Recumbent bike full revolutions 8 minutes, 00 resistance  Mini squats (UE supported) 3 x 10, SBA   Weight shifting sagittal and frontal plane 2 x 10, SBA    Heel raises 2 x 10, UE supported  Standing hamstring curl 2x10 (UE supported)  NBOS balance, eyes open 2 x 20 s  airex, CGA   Staggered balance eyes open 2 x 20s  airex , CGA   Side stepping in // bars 3 L x 2, UE supported SBA   // bars, 4 inch step ups 3 x 5, BL UE suported, CGA       Therapeutic activities 30 minutes:   Objective testing   Gait  Tug  5x sit to stand        Patient Education and Home Exercises       Education provided:   - HEP verbalized    Written Home Exercises Provided: Patient instructed to cont prior HEP. Exercises were reviewed and Gerda was able to demonstrate them prior to the end of the session.  Gerda demonstrated good  understanding of the education provided. See EMR under Patient Instructions for exercises provided during therapy sessions    Assessment     At progress note, pt is approximately 11 weeks SP RIGHT TKA. Pt demonstrates positive carry over with improving R knee active ROM and strength. Pt presents with minimal R knee rom deficits vs left knee but improving. Pt is currently ambulating safely with SPC. Pt will benefit from progressive skilled physical therapy to improve her LE strength/ROM and to increase her walking tolerance .     Gerda Is progressing well towards her goals.   Pt prognosis is Excellent.     Pt will continue to benefit from skilled outpatient physical therapy to address the deficits listed in the problem list  box on initial evaluation, provide pt/family education and to maximize pt's level of independence in the home and community environment.     Pt's spiritual, cultural and educational needs considered and pt agreeable to plan of care and goals.     Anticipated barriers to physical therapy: none      Goals:  Short Term Goals:   Pt demonstrate indep with initial hep 4 weeks (MET)  Pt demonstrate equalized knee arom to normalize gait  4 weeks (ONGOING)     Long Term Goals:  Pt demonstrate tug test 13 sec or less to decrease fall risk  8 weeks (ONGOING)  Pt demonstrate 5 x sit to stand 13 secs or less to improve LE strength for community ambulation  8 weeks (ONGOING)    Plan       Plan of care Certification: 7/12/2023 to 10/12/2023.     Outpatient Physical Therapy 2 times weekly for 6 weeks to include the following interventions: Gait Training, Manual Therapy, Moist Heat/ Ice, Neuromuscular Re-ed, Patient Education, Self Care, Therapeutic Activities, and Therapeutic Exercise.    Carlos Bravo, PT

## 2023-08-10 ENCOUNTER — CLINICAL SUPPORT (OUTPATIENT)
Dept: REHABILITATION | Facility: HOSPITAL | Age: 88
End: 2023-08-10
Attending: STUDENT IN AN ORGANIZED HEALTH CARE EDUCATION/TRAINING PROGRAM
Payer: MEDICARE

## 2023-08-10 DIAGNOSIS — G89.29 CHRONIC BILATERAL LOW BACK PAIN WITHOUT SCIATICA: ICD-10-CM

## 2023-08-10 DIAGNOSIS — M54.50 CHRONIC BILATERAL LOW BACK PAIN WITHOUT SCIATICA: ICD-10-CM

## 2023-08-10 PROCEDURE — 97810 ACUP 1/> WO ESTIM 1ST 15 MIN: CPT

## 2023-08-10 NOTE — PROGRESS NOTES
Therapy Evaluation    Patient Name: Gerda Lai  Date of Visit: 8/10/2023  MRN: [id number]  Diagnosis:   Encounter Diagnosis   Name Primary?    Chronic bilateral low back pain without sciatica      Referring Physician: Andi Cisneros,*      Subjective:   Total Time:  0      Objective    Assessment  Gerda is a 89 y.o. female referred to outpatient {THERAPY:90372} and presents with a medical diagnosis of ***. She demonstrates limitations as described in the problem list. These impairments are limiting the patient's ability to ***.    Assessment    FOTO    Plan

## 2023-08-10 NOTE — PROGRESS NOTES
Acupuncture Evaluation Note     Name: Gerda Lai  Clinic Number: [id number]    Traditional Chinese Medicine (TCM) Diagnosis: Qi Stagnation and Blood Stasis  Medical Diagnosis:   Encounter Diagnosis   Name Primary?    Chronic bilateral low back pain without sciatica         Evaluation Date: 8/10/2023    Visit #/Visits authorized: 1/ 12  (medicare authorized on 07/18/2023)    Precautions: Standard    Subjective     Chief Concern: Chronic low back pain and chronic Neck pain, had several procedures, was referred for acupuncture  Low back pain on right side, had right hip replaced 15 years ago, pain in right buttock, and comes and goes.  Neck pain from shoulder up to neck and to base of skull on the left. Neck pain for several years. When she wakes in the morning neck is very stiff, pain comes and goes during the day. Restricted range of motion in head. Pain is experienced as an ache.     Medical necessity is demonstrated by the following IMPAIRMENTS: Medical Necessity: Decreased mobility limits day to day activities, social, and emergent situations and Decreased quality of life        HEENT:  not assessed    Chest:  not assessed    Digestion:     Taste/Appetite: not assessed    Sleep:   not assessed    Energy Levels:  not assessed    Psychological Symptoms:  not assessed    Other Symptoms:     - High blood pressure, being monitored, not stable    - Knee surgery in May, right knee, left knee replaced several years ago. Right knee is still swollen. Doing PT for right knee now. The knee prevents her from walking distances for now. Pain in knee is behind knee and radiates up back of leg. Before PT was feeling knee pain below the lateral knee radiating down lateral calf.    GYN Symptoms:   post-menopausal    Objective     Observation: good griggs    Tongue:  not assessed    Pulse:  not assessed    Treatment     Treatment Principles:  Move Qi & Blood; stop pain    Needle Set 1 -     Acupuncture points used:   Sitting  -  fascia method  Bilateral:  BL11, BL12, BL25, BL23  Left:  neck lara +3  Right:  low back lateral sacrum +2  Centerline:    Needles In: 13  Needles Out: 13  Needles W/O STIM placed:   2:40  Needles W/O STIM removed:   3:00      Other Traditional Chinese Medicine Modalities -  none    Assessment     After treatment, patient felt  Slight easing of neck pain on the left.     Patient prognosis is Fair.     Patient will continue to benefit from acupuncture treatment to address the deficits listed in the problem list box on initial evaluation, provide patient family education and to maximize pt's level of independence in the home and community environment.     Patient's spiritual, cultural and educational needs considered and pt agreeable to plan of care and goals.       Anticipated barriers to treatment:   none    Plan     Recommend 1 /week for 6 sessions then re-assess.      Recommendations:  none at this time    Education:  Patient is aware of cumulative effect of acupuncture      No heavy lifting/straining

## 2023-08-14 ENCOUNTER — CLINICAL SUPPORT (OUTPATIENT)
Dept: REHABILITATION | Facility: HOSPITAL | Age: 88
End: 2023-08-14
Payer: MEDICARE

## 2023-08-14 DIAGNOSIS — R26.2 DIFFICULTY WALKING: Primary | ICD-10-CM

## 2023-08-14 PROCEDURE — 97110 THERAPEUTIC EXERCISES: CPT | Mod: PO

## 2023-08-14 NOTE — PROGRESS NOTES
[facility]   Physical Therapy Treatment Note      Name: Gerda Lai  Clinic Number: [id number]    Therapy Diagnosis:   Encounter Diagnosis   Name Primary?    Difficulty walking Yes         Physician: Milton Cobian III, *    Visit Date: 8/14/2023    Physician Orders: PT Eval and Treat, R knee Note:    Eval and treat with modalities: s/p R TKA, focus on general gait training/balance/stability, does not need aggressive R TKA rehab (would normally be done with knee PT) 2x/week x4 weeks  Medical Diagnosis from Referral: Status post right knee replacement [Z96.651]  Evaluation Date: 7/12/2023  Authorization Period Expiration: 7/5/2024  Plan of Care Expiration: 10/12/2024  Progress Note Due: 9/9/2023   Visit # / Visits authorized: 8/ 40   FOTO: 2/3     Precautions: Standard  and R TKA      Time In: 2:06 p   Time Out: 2: 44 p   Total Billable Time: 38 minutes    PTA Visit #: 0/5       Subjective     Pt reports: she did good after last time.  She was compliant with home exercise program.    SP RIGHT TKA; DOS 5/22/2023     Response to previous treatment: good response   Functional change: ongoing    Pain: 0/10  Location: na    Objective      Objective Measures updated at progress report unless specified.   Updated 8/9/2023     Observation:  Gait: SPC, decreased speed, decreased stride         Functional tests:   Tug test: 17.67s, SPC   5 x sit to stand test: 16.63s, Mod UE usage         Range of Motion (Active):   Knee Right  Left    Flexion 115 123   Extension 0 0      Quad activation, right full tetany equal to L knee      Joint Mobility:      LEFT  PF 3/6 ; except sup 2/6  RIGHT  PF 3/6      LEFT   TF post 2/6, ant 3/6  RIGHT   TF post 2/6, ant 3/6       Palpation: R knee incision healing clean and normal     Sensation: gross intact  R LE      Edema: mild edema noted R knee            Intake Outcome Measure for FOTO knee Survey     Therapist reviewed FOTO scores for Gerda Lai on  8/9/2023.   FOTO documents entered into [employer] - see Media section.     Intake Score: 86%          Treatment     Gerda received the treatments listed below:      therapeutic exercises to develop strength, endurance, ROM, flexibility, posture, and core stabilization for 38 minutes including:    BOLD exercises =Performed today       Recumbent bike full revolutions 8 minutes, 00 resistance  Side stepping with SPC, SBA 2 x 10  Step ups, 4 inch , 3 x 5, unilat UE support and SBA   Gait training CGA, no AD.  100 ft       Patient Education and Home Exercises       Education provided:   - HEP verbalized    Written Home Exercises Provided: Patient instructed to cont prior HEP. Exercises were reviewed and Gerda was able to demonstrate them prior to the end of the session.  Gerda demonstrated good  understanding of the education provided. See EMR under Patient Instructions for exercises provided during therapy sessions    Assessment     Pt demonstrates good response to current interventions and is happy with her progress with physical therapy. Gait training was performed today without AD, using CGA. Pt demonstrates decreased confidence, coordination and stride during this trial ambulation. PT recommended continued use of SPC. Pt will benefit from progressive LE strengthening and balance training to increase her walking tolerance.     Gerda Is progressing well towards her goals.   Pt prognosis is Excellent.     Pt will continue to benefit from skilled outpatient physical therapy to address the deficits listed in the problem list box on initial evaluation, provide pt/family education and to maximize pt's level of independence in the home and community environment.     Pt's spiritual, cultural and educational needs considered and pt agreeable to plan of care and goals.     Anticipated barriers to physical therapy: none      Goals:  Short Term Goals:   Pt demonstrate indep with initial hep 4 weeks (MET)  Pt demonstrate equalized  knee arom to normalize gait  4 weeks (ONGOING)     Long Term Goals:  Pt demonstrate tug test 13 sec or less to decrease fall risk  8 weeks (ONGOING)  Pt demonstrate 5 x sit to stand 13 secs or less to improve LE strength for community ambulation  8 weeks (ONGOING)    Plan       Plan of care Certification: 7/12/2023 to 10/12/2023.     Outpatient Physical Therapy 2 times weekly for 6 weeks to include the following interventions: Gait Training, Manual Therapy, Moist Heat/ Ice, Neuromuscular Re-ed, Patient Education, Self Care, Therapeutic Activities, and Therapeutic Exercise.    Carlos Bravo, PT

## 2023-08-15 ENCOUNTER — OFFICE VISIT (OUTPATIENT)
Dept: ORTHOPEDICS | Facility: CLINIC | Age: 88
End: 2023-08-15
Payer: MEDICARE

## 2023-08-15 VITALS — HEIGHT: 63 IN | WEIGHT: 141 LBS | BODY MASS INDEX: 24.98 KG/M2

## 2023-08-15 DIAGNOSIS — Z96.651 STATUS POST RIGHT KNEE REPLACEMENT: Primary | ICD-10-CM

## 2023-08-15 PROCEDURE — 99024 PR POST-OP FOLLOW-UP VISIT: ICD-10-PCS | Mod: POP,,, | Performed by: ORTHOPAEDIC SURGERY

## 2023-08-15 PROCEDURE — 99999 PR PBB SHADOW E&M-EST. PATIENT-LVL III: CPT | Mod: PBBFAC,,, | Performed by: ORTHOPAEDIC SURGERY

## 2023-08-15 PROCEDURE — 99213 OFFICE O/P EST LOW 20 MIN: CPT | Mod: PBBFAC | Performed by: ORTHOPAEDIC SURGERY

## 2023-08-15 PROCEDURE — 99024 POSTOP FOLLOW-UP VISIT: CPT | Mod: POP,,, | Performed by: ORTHOPAEDIC SURGERY

## 2023-08-15 PROCEDURE — 99999 PR PBB SHADOW E&M-EST. PATIENT-LVL III: ICD-10-PCS | Mod: PBBFAC,,, | Performed by: ORTHOPAEDIC SURGERY

## 2023-08-15 NOTE — PROGRESS NOTES
Subjective:     HPI:   Gerda Lai is a 89 y.o. female who presents 12 weeks out from right TKA    Date of surgery: 5/22/23    Medications: tylenol 2-3x/day, off oxycodone    Assistive Devices: cane out of the house    PT: ongoing for balance/gait training - able to walk across rehab without cane yesterday    Overall happy with the knee, no pain/problems/concerns       Objective:   Body mass index is 24.98 kg/m².  Exam:    Gait: limp/antalgic none    Incision: healed    Stability:  Knee stable anterior-posterior varus and valgus stresses, no extensor lag    Extension: 0    Flexion: 120    Pre-op 2-130, 7 deg valgus    1x1cm sup skin irritation sup lat to incision ?psoriasis    Imaging:  None today        Assessment:       ICD-10-CM ICD-9-CM   1. Status post right knee replacement  Z96.651 V43.65      Doing well     Plan:       Patient is doing very well with their total knee arthroplasty.  They will continue with their routine care of the knee replacement and see me back for their follow-up at the routine interval.  If there are problems in the interim they will see me back sooner. Prophylactic antibiotic protocol given and explained to patient.     Done with PT from TKA standpoint  Has 2 more weeks for gait/balance  Cane PRN    Try cortisone cream at skin area    9 month follow up for annual xrays       No orders of the defined types were placed in this encounter.            Past Medical History:   Diagnosis Date    Anxiety     Arthritis     Basal cell carcinoma 09/2016    right post auricular neck     Basal cell carcinoma of right forearm 04/19/2023    R lower forearm    Breast cancer 1992    right    Cataract     Fibromyalgia     History of measles as a child     Hyperlipidemia     Hypertension     Personal history of colonic polyps     Pneumonia     SCC (squamous cell carcinoma) 2015    R chest    SCC (squamous cell carcinoma) 2016    left medial shoulder    SCC (squamous cell carcinoma) 2017    left knee     SCC (squamous cell carcinoma) 2022    L upper chest, R upper arm KA types    Shingles     Squamous cell carcinoma 2015    right forearm    Thyroid disease     Vaginitis        Past Surgical History:   Procedure Laterality Date    ADENOIDECTOMY      ARTHROPLASTY, KNEE, TOTAL, USING COMPUTER-ASSISTED NAVIGATION Right 5/22/2023    Procedure: ARTHROPLASTY, KNEE, TOTAL, USING COMPUTER-ASSISTED NAVIGATION: QUYEN: RIGHT: MARTHA - TRIATHALON;  Surgeon: Milotn Cobian III, MD;  Location: [facility] OR;  Service: Orthopedics;  Laterality: Right;    BREAST BIOPSY Left     Excisional bx, benign    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    CARPAL TUNNEL RELEASE Right 3/16/2021    Procedure: RELEASE, CARPAL TUNNEL;  Surgeon: Barbara Aldridge MD;  Location: [facility] OR;  Service: Orthopedics;  Laterality: Right;    CATARACT EXTRACTION W/  INTRAOCULAR LENS IMPLANT Bilateral     EPIDURAL STEROID INJECTION N/A 2/24/2023    Procedure: HEIDI C7-T1;  Surgeon: Andi Cisneros DO;  Location: [facility] OR;  Service: Pain Management;  Laterality: N/A;    EYE SURGERY      HAND SURGERY      INJECTION OF ANESTHETIC AGENT AROUND MEDIAL BRANCH NERVES INNERVATING CERVICAL FACET JOINT N/A 1/4/2022    Procedure: Cervical Medial Branch Block C5-6, C6-7 (No Sedation);  Surgeon: Himanshu Blackmon Jr., MD;  Location: [facility] PAIN MGT;  Service: Pain Management;  Laterality: N/A;    INJECTION OF ANESTHETIC AGENT AROUND MEDIAL BRANCH NERVES INNERVATING LUMBAR FACET JOINT Left 11/18/2021    Procedure: Cervical Medial Branch Block Left C5-6, C6-7 (IV Sedation);  Surgeon: Himanshu Blackmon Jr., MD;  Location: [facility] PAIN MGT;  Service: Pain Management;  Laterality: Left;    JOINT REPLACEMENT Right     ELZBIETA    KNEE ARTHROPLASTY Left 8/10/2020    Procedure: ARTHROPLASTY, KNEE-ADE NEXGEN/CEMENTED TIBIA;  Surgeon: Kalin Pacheco MD;  Location: [facility] OR;  Service: Orthopedics;  Laterality: Left;    RADIOFREQUENCY THERMAL COAGULATION OF MEDIAL BRANCH OF POSTERIOR RAMUS OF  CERVICAL SPINAL NERVE Left 3/8/2022    Procedure: RADIOFREQUENCY THERMAL COAGULATION, NERVE, SPINAL, CERVICAL, LEFT C5-6 AND C6-7;  Surgeon: Himanshu Blackmon Jr., MD;  Location: [facility];  Service: Pain Management;  Laterality: Left;  NO PACEMAKER   ASA    thyriodectomy      partial    tonsillectomy      TONSILLECTOMY      TOTAL HIP ARTHROPLASTY      right    Yag  Left        Family History   Problem Relation Age of Onset    Breast cancer Mother     Cancer Mother     Stroke Paternal Grandfather     Heart disease Father     Cancer Paternal Aunt     Heart disease Paternal Aunt     Glaucoma Paternal Grandmother     Amblyopia Neg Hx     Blindness Neg Hx     Cataracts Neg Hx     Diabetes Neg Hx     Hypertension Neg Hx     Macular degeneration Neg Hx     Retinal detachment Neg Hx     Strabismus Neg Hx     Thyroid disease Neg Hx     Melanoma Neg Hx        Social History     Socioeconomic History    Marital status: Single   Occupational History     Employer: RETIRED   Tobacco Use    Smoking status: Never     Passive exposure: Never    Smokeless tobacco: Never   Substance and Sexual Activity    Alcohol use: Yes     Comment: socially - celebrations only    Drug use: Never    Sexual activity: Not Currently     Social Determinants of Health     Financial Resource Strain: Low Risk  (7/25/2022)    Overall Financial Resource Strain (CARDIA)     Difficulty of Paying Living Expenses: Not hard at all   Food Insecurity: No Food Insecurity (7/25/2022)    Hunger Vital Sign     Worried About Running Out of Food in the Last Year: Never true     Ran Out of Food in the Last Year: Never true   Transportation Needs: No Transportation Needs (7/25/2022)    PRAPARE - Transportation     Lack of Transportation (Medical): No     Lack of Transportation (Non-Medical): No   Stress: Stress Concern Present (7/25/2022)    [employer] - Occupational Stress Questionnaire     Feeling of Stress : To some extent   Housing  Stability: Unknown (7/25/2022)    Housing Stability Vital Sign     Unable to Pay for Housing in the Last Year: No     Unstable Housing in the Last Year: No

## 2023-08-17 NOTE — ANESTHESIA POSTPROCEDURE EVALUATION
Anesthesia Post Evaluation    Patient: Gerda Lai    Procedure(s) Performed: Procedure(s) (LRB):  ARTHROPLASTY, KNEE, TOTAL, USING COMPUTER-ASSISTED NAVIGATION: QUYEN: RIGHT: MARTHA EMERSON (Right)    Final Anesthesia Type: CSE      Patient location during evaluation: PACU  Patient participation: Yes- Able to Participate  Level of consciousness: awake and alert and oriented  Post-procedure vital signs: reviewed and stable  Pain management: adequate  Airway patency: patent    PONV status at discharge: No PONV  Anesthetic complications: no      Cardiovascular status: hemodynamically stable  Respiratory status: unassisted, spontaneous ventilation and room air  Hydration status: euvolemic  Follow-up not needed.          Vitals Value Taken Time   /60 05/23/23 1009   Temp 36.6 °C (97.9 °F) 05/23/23 0831   Pulse 60 05/23/23 0831   Resp 16 05/23/23 0831   SpO2 98 % 05/23/23 0831         Event Time   Out of Recovery 17:35:00         Pain/Ilda Score: Pain Rating Prior to Med Admin: 2 (5/23/2023  5:44 AM)  Pain Rating Post Med Admin: 4 (5/22/2023  5:23 PM)  Ilda Score: 10 (5/22/2023  4:25 PM)         Message left on wife's voicemail explaining that I would like to start Izzy Jose De Jesus on a second antiseizure medication but would like to discuss it with her first.  Advised her to call office back to discuss

## 2023-08-18 ENCOUNTER — NURSE TRIAGE (OUTPATIENT)
Dept: ADMINISTRATIVE | Facility: CLINIC | Age: 88
End: 2023-08-18
Payer: MEDICARE

## 2023-08-18 NOTE — TELEPHONE ENCOUNTER
Reason for Disposition   Sounds like a life-threatening emergency to the triager    Additional Information   Negative: SEVERE difficulty breathing (e.g., struggling for each breath, speaks in single words)   Negative: Shock suspected (e.g., cold/pale/clammy skin, too weak to stand, low BP, rapid pulse)   Negative: Difficult to awaken or acting confused (e.g., disoriented, slurred speech)   Negative: Fainted, and still feels dizzy afterwards   Negative: Overdose (accidental or intentional) of medications   Negative: New neurologic deficit that is present now: * Weakness of the face, arm, or leg on one side of the body * Numbness of the face, arm, or leg on one side of the body * Loss of speech or garbled speech   Negative: Heart beating < 50 beats per minute OR > 140 beats per minute    Protocols used: Dizziness-A-OH  Pt admits to feeling dizzy x 3 days. Pt states she feels a little better today. was having diarrhea for couple days- some diarrhea but not much today. trying to drink. was having pain in neck and used Aspercreme.  Pt states she had to go out to [employer] to clean an apt recently that was pretty filthy and got pretty wiped out. Pt admits to some sl nausea and indigestion. Pt out of her prilosec. Denies blood in stool. Rec EMS. Pt states she has someone sitting with her today. Reiterate EMS. Pt agrees.

## 2023-08-19 ENCOUNTER — HOSPITAL ENCOUNTER (EMERGENCY)
Facility: HOSPITAL | Age: 88
Discharge: HOME | End: 2023-08-19
Attending: EMERGENCY MEDICINE
Payer: MEDICARE

## 2023-08-19 ENCOUNTER — PATIENT MESSAGE (OUTPATIENT)
Dept: INTERNAL MEDICINE | Facility: CLINIC | Age: 88
End: 2023-08-19
Payer: MEDICARE

## 2023-08-19 VITALS
TEMPERATURE: 98 F | OXYGEN SATURATION: 100 % | HEIGHT: 62 IN | RESPIRATION RATE: 30 BRPM | BODY MASS INDEX: 25.21 KG/M2 | WEIGHT: 137 LBS | DIASTOLIC BLOOD PRESSURE: 66 MMHG | HEART RATE: 60 BPM | SYSTOLIC BLOOD PRESSURE: 148 MMHG

## 2023-08-19 DIAGNOSIS — R42 DIZZINESS: Primary | ICD-10-CM

## 2023-08-19 LAB
ALBUMIN SERPL BCP-MCNC: 4.3 G/DL (ref 3.5–5.2)
ALP SERPL-CCNC: 69 U/L (ref 55–135)
ALT SERPL W/O P-5'-P-CCNC: 18 U/L (ref 10–44)
ANION GAP SERPL CALC-SCNC: 11 MMOL/L (ref 8–16)
AST SERPL-CCNC: 23 U/L (ref 10–40)
BACTERIA #/AREA URNS HPF: ABNORMAL /HPF
BASOPHILS # BLD AUTO: 0.03 K/UL (ref 0–0.2)
BASOPHILS NFR BLD: 0.4 % (ref 0–1.9)
BILIRUB SERPL-MCNC: 0.7 MG/DL (ref 0.1–1)
BILIRUB UR QL STRIP: NEGATIVE
BNP SERPL-MCNC: 42 PG/ML (ref 0–99)
BUN SERPL-MCNC: 54 MG/DL (ref 8–23)
CALCIUM SERPL-MCNC: 9.6 MG/DL (ref 8.7–10.5)
CHLORIDE SERPL-SCNC: 109 MMOL/L (ref 95–110)
CLARITY UR: CLEAR
CO2 SERPL-SCNC: 21 MMOL/L (ref 23–29)
COLOR UR: YELLOW
CREAT SERPL-MCNC: 1.6 MG/DL (ref 0.5–1.4)
DIFFERENTIAL METHOD: ABNORMAL
EOSINOPHIL # BLD AUTO: 0.2 K/UL (ref 0–0.5)
EOSINOPHIL NFR BLD: 2.7 % (ref 0–8)
ERYTHROCYTE [DISTWIDTH] IN BLOOD BY AUTOMATED COUNT: 11.9 % (ref 11.5–14.5)
EST. GFR  (NO RACE VARIABLE): 30 ML/MIN/1.73 M^2
GLUCOSE SERPL-MCNC: 85 MG/DL (ref 70–110)
GLUCOSE UR QL STRIP: NEGATIVE
HCT VFR BLD AUTO: 36.5 % (ref 37–48.5)
HGB BLD-MCNC: 12.4 G/DL (ref 12–16)
HGB UR QL STRIP: NEGATIVE
HYALINE CASTS #/AREA URNS LPF: 7 /LPF
IMM GRANULOCYTES # BLD AUTO: 0.03 K/UL (ref 0–0.04)
IMM GRANULOCYTES NFR BLD AUTO: 0.4 % (ref 0–0.5)
KETONES UR QL STRIP: NEGATIVE
LEUKOCYTE ESTERASE UR QL STRIP: NEGATIVE
LYMPHOCYTES # BLD AUTO: 2.1 K/UL (ref 1–4.8)
LYMPHOCYTES NFR BLD: 29.7 % (ref 18–48)
MCH RBC QN AUTO: 33 PG (ref 27–31)
MCHC RBC AUTO-ENTMCNC: 34 G/DL (ref 32–36)
MCV RBC AUTO: 97 FL (ref 82–98)
MICROSCOPIC COMMENT: ABNORMAL
MONOCYTES # BLD AUTO: 0.8 K/UL (ref 0.3–1)
MONOCYTES NFR BLD: 10.9 % (ref 4–15)
NEUTROPHILS # BLD AUTO: 4 K/UL (ref 1.8–7.7)
NEUTROPHILS NFR BLD: 55.9 % (ref 38–73)
NITRITE UR QL STRIP: NEGATIVE
NRBC BLD-RTO: 0 /100 WBC
PH UR STRIP: 5 [PH] (ref 5–8)
PLATELET # BLD AUTO: 229 K/UL (ref 150–450)
PMV BLD AUTO: 9.3 FL (ref 9.2–12.9)
POTASSIUM SERPL-SCNC: 3.7 MMOL/L (ref 3.5–5.1)
PROT SERPL-MCNC: 7.2 G/DL (ref 6–8.4)
PROT UR QL STRIP: ABNORMAL
RBC # BLD AUTO: 3.76 M/UL (ref 4–5.4)
RBC #/AREA URNS HPF: 1 /HPF (ref 0–4)
SODIUM SERPL-SCNC: 141 MMOL/L (ref 136–145)
SP GR UR STRIP: 1.03 (ref 1–1.03)
SQUAMOUS #/AREA URNS HPF: 1 /HPF
TROPONIN I SERPL DL<=0.01 NG/ML-MCNC: <0.006 NG/ML (ref 0–0.03)
URN SPEC COLLECT METH UR: ABNORMAL
UROBILINOGEN UR STRIP-ACNC: NEGATIVE EU/DL
WBC # BLD AUTO: 7.13 K/UL (ref 3.9–12.7)
WBC #/AREA URNS HPF: 3 /HPF (ref 0–5)

## 2023-08-19 PROCEDURE — 25000003 PHARM REV CODE 250: Performed by: EMERGENCY MEDICINE

## 2023-08-19 PROCEDURE — 81000 URINALYSIS NONAUTO W/SCOPE: CPT | Performed by: EMERGENCY MEDICINE

## 2023-08-19 PROCEDURE — 99285 EMERGENCY DEPT VISIT HI MDM: CPT | Mod: 25

## 2023-08-19 PROCEDURE — 84484 ASSAY OF TROPONIN QUANT: CPT | Performed by: EMERGENCY MEDICINE

## 2023-08-19 PROCEDURE — 93010 EKG 12-LEAD: ICD-10-PCS | Mod: ,,, | Performed by: INTERNAL MEDICINE

## 2023-08-19 PROCEDURE — 83880 ASSAY OF NATRIURETIC PEPTIDE: CPT | Performed by: EMERGENCY MEDICINE

## 2023-08-19 PROCEDURE — 63600175 PHARM REV CODE 636 W HCPCS: Performed by: EMERGENCY MEDICINE

## 2023-08-19 PROCEDURE — 93010 ELECTROCARDIOGRAM REPORT: CPT | Mod: ,,, | Performed by: INTERNAL MEDICINE

## 2023-08-19 PROCEDURE — 85025 COMPLETE CBC W/AUTO DIFF WBC: CPT | Performed by: EMERGENCY MEDICINE

## 2023-08-19 PROCEDURE — 93005 ELECTROCARDIOGRAM TRACING: CPT

## 2023-08-19 PROCEDURE — 80053 COMPREHEN METABOLIC PANEL: CPT | Performed by: EMERGENCY MEDICINE

## 2023-08-19 RX ORDER — MECLIZINE HYDROCHLORIDE 25 MG/1
25 TABLET ORAL
Status: COMPLETED | OUTPATIENT
Start: 2023-08-19 | End: 2023-08-19

## 2023-08-19 RX ORDER — MECLIZINE HYDROCHLORIDE 25 MG/1
25 TABLET ORAL 3 TIMES DAILY PRN
Qty: 6 TABLET | Refills: 0 | Status: SHIPPED | OUTPATIENT
Start: 2023-08-19 | End: 2023-08-29

## 2023-08-19 RX ADMIN — MECLIZINE HYDROCHLORIDE 25 MG: 25 TABLET ORAL at 03:08

## 2023-08-19 RX ADMIN — SODIUM CHLORIDE, POTASSIUM CHLORIDE, SODIUM LACTATE AND CALCIUM CHLORIDE 500 ML: 600; 310; 30; 20 INJECTION, SOLUTION INTRAVENOUS at 03:08

## 2023-08-19 NOTE — DISCHARGE INSTRUCTIONS
1.) You need to have your kidney function labs rechecked within the next week.  2.) Return immediately for any new or worsening symptoms.

## 2023-08-19 NOTE — ED PROVIDER NOTES
Encounter Date: 8/19/2023       History     Chief Complaint   Patient presents with    Dizziness     Dizziness since Wednesday.  Diarrhea and loss of appetite that started Wednesday and has continued to today.      Patient arrives with daughter.  The chief complaint is intermittent dizziness for the past 3 days.  She states that she was okay walking up the steps to come to the emergency department today.  She normally ambulates with a cane.  She denies any headache or visual abnormalities.  She denies any focal motor weakness paresthesias or numbness.  She states that she has lots of ear problems, she does wear hearing aids.  She denies any pain from the ears.  She denies any chest pain/pressure/tightness.  She denies any shortness of breath or dyspnea on exertion.  She states that she has been having some diarrhea it has been intermittent with 1 episode this morning she thinks because she drank coffee.  She has had a decreased appetite but has been tolerating food, states that she ate eggs and a piece of pancakes yesterday along with some chicken noodle soup.  She denies any nausea/vomiting.  She denies any pain with eating.  She denies any fevers/chills.      Review of patient's allergies indicates:   Allergen Reactions    Sulfa (sulfonamide antibiotics) Rash    Clarithromycin Other (See Comments)     Weak, extreme fatigue. dizziness    Flexeril [cyclobenzaprine] Other (See Comments)     Dizziness    Iodine and iodide containing products     Lisinopril Other (See Comments)     Cough and sensation of throat swelling/?angioedema    Losartan Rash    Metoprolol Swelling     Tightness in throat    Spironolactone      Throat tightening    Tramadol Other (See Comments)     Dizzy and weak    Verapamil (bulk) Palpitations    Voltaren [diclofenac sodium] Other (See Comments)     Drops blood pressure     Past Medical History:   Diagnosis Date    Anxiety     Arthritis     Basal cell carcinoma 09/2016    right post auricular  neck     Basal cell carcinoma of right forearm 04/19/2023    R lower forearm    Breast cancer 1992    right    Cataract     Fibromyalgia     History of measles as a child     Hyperlipidemia     Hypertension     Personal history of colonic polyps     Pneumonia     SCC (squamous cell carcinoma) 2015    R chest    SCC (squamous cell carcinoma) 2016    left medial shoulder    SCC (squamous cell carcinoma) 2017    left knee    SCC (squamous cell carcinoma) 2022    L upper chest, R upper arm KA types    Shingles     Squamous cell carcinoma 2015    right forearm    Thyroid disease     Vaginitis      Past Surgical History:   Procedure Laterality Date    ADENOIDECTOMY      ARTHROPLASTY, KNEE, TOTAL, USING COMPUTER-ASSISTED NAVIGATION Right 5/22/2023    Procedure: ARTHROPLASTY, KNEE, TOTAL, USING COMPUTER-ASSISTED NAVIGATION: QUYEN: RIGHT: MARTHA - TRIATHALON;  Surgeon: Milton Cobian III, MD;  Location: [facility] OR;  Service: Orthopedics;  Laterality: Right;    BREAST BIOPSY Left     Excisional bx, benign    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    CARPAL TUNNEL RELEASE Right 3/16/2021    Procedure: RELEASE, CARPAL TUNNEL;  Surgeon: Barbara Aldridge MD;  Location: [facility] OR;  Service: Orthopedics;  Laterality: Right;    CATARACT EXTRACTION W/  INTRAOCULAR LENS IMPLANT Bilateral     EPIDURAL STEROID INJECTION N/A 2/24/2023    Procedure: HEIDI C7-T1;  Surgeon: Andi Cisneros DO;  Location: [facility] OR;  Service: Pain Management;  Laterality: N/A;    EYE SURGERY      HAND SURGERY      INJECTION OF ANESTHETIC AGENT AROUND MEDIAL BRANCH NERVES INNERVATING CERVICAL FACET JOINT N/A 1/4/2022    Procedure: Cervical Medial Branch Block C5-6, C6-7 (No Sedation);  Surgeon: Himanshu Blackmon Jr., MD;  Location: [facility] PAIN MGT;  Service: Pain Management;  Laterality: N/A;    INJECTION OF ANESTHETIC AGENT AROUND MEDIAL BRANCH NERVES INNERVATING LUMBAR FACET JOINT Left 11/18/2021    Procedure: Cervical Medial Branch Block Left C5-6, C6-7 (IV  Sedation);  Surgeon: Himanshu Blackmon Jr., MD;  Location: [facility] PAIN MGT;  Service: Pain Management;  Laterality: Left;    JOINT REPLACEMENT Right     ELZBIETA    KNEE ARTHROPLASTY Left 8/10/2020    Procedure: ARTHROPLASTY, KNEE-ADE NEXGEN/CEMENTED TIBIA;  Surgeon: Kalin Pacheco MD;  Location: [facility] OR;  Service: Orthopedics;  Laterality: Left;    RADIOFREQUENCY THERMAL COAGULATION OF MEDIAL BRANCH OF POSTERIOR RAMUS OF CERVICAL SPINAL NERVE Left 3/8/2022    Procedure: RADIOFREQUENCY THERMAL COAGULATION, NERVE, SPINAL, CERVICAL, LEFT C5-6 AND C6-7;  Surgeon: Himanshu Blackmon Jr., MD;  Location: [facility] PAIN MGT;  Service: Pain Management;  Laterality: Left;  NO PACEMAKER   ASA    thyriodectomy      partial    tonsillectomy      TONSILLECTOMY      TOTAL HIP ARTHROPLASTY      right    Yag  Left      Family History   Problem Relation Age of Onset    Breast cancer Mother     Cancer Mother     Stroke Paternal Grandfather     Heart disease Father     Cancer Paternal Aunt     Heart disease Paternal Aunt     Glaucoma Paternal Grandmother     Amblyopia Neg Hx     Blindness Neg Hx     Cataracts Neg Hx     Diabetes Neg Hx     Hypertension Neg Hx     Macular degeneration Neg Hx     Retinal detachment Neg Hx     Strabismus Neg Hx     Thyroid disease Neg Hx     Melanoma Neg Hx      Social History     Tobacco Use    Smoking status: Never     Passive exposure: Never    Smokeless tobacco: Never   Substance Use Topics    Alcohol use: Yes     Comment: socially - celebrations only    Drug use: Never     Review of Systems   Gastrointestinal:  Positive for diarrhea.   Neurological:  Positive for dizziness.       Physical Exam     Initial Vitals   BP Pulse Resp Temp SpO2   08/19/23 1231 08/19/23 1231 08/19/23 1231 08/19/23 1234 08/19/23 1231   (!) 127/59 (!) 54 16 97.8 °F (36.6 °C) 96 %      MAP       --                Physical Exam    Vitals reviewed.  Constitutional: She appears well-developed and well-nourished.   HENT:   Head:  Normocephalic and atraumatic.   Right Ear: External ear normal.   There is some cerumen but no impaction TM clear   Eyes: EOM are normal. Pupils are equal, round, and reactive to light.   Cardiovascular:  Regular rhythm.           No murmur heard.  Pulmonary/Chest: Breath sounds normal. She has no wheezes.   Abdominal: Abdomen is soft. There is no abdominal tenderness.   Musculoskeletal:         General: Normal range of motion.     Neurological: She is alert and oriented to person, place, and time. She has normal strength. No sensory deficit.   Speech and cognition are normal, finger-to-nose test is normal.   Skin: Skin is warm and dry. No rash noted.         ED Course   Procedures  Labs Reviewed   CBC W/ AUTO DIFFERENTIAL - Abnormal; Notable for the following components:       Result Value    RBC 3.76 (*)     Hematocrit 36.5 (*)     MCH 33.0 (*)     All other components within normal limits   COMPREHENSIVE METABOLIC PANEL - Abnormal; Notable for the following components:    CO2 21 (*)     BUN 54 (*)     Creatinine 1.6 (*)     eGFR 30 (*)     All other components within normal limits   URINALYSIS, REFLEX TO URINE CULTURE - Abnormal; Notable for the following components:    Protein, UA 1+ (*)     All other components within normal limits    Narrative:     Specimen Source->Urine   URINALYSIS MICROSCOPIC - Abnormal; Notable for the following components:    Hyaline Casts, UA 7 (*)     All other components within normal limits    Narrative:     Specimen Source->Urine   TROPONIN I   B-TYPE NATRIURETIC PEPTIDE     EKG Readings: (Independently Interpreted)   Sinus rhythm rate of 52, normal axis, first-degree AV block , nonspecific T-wave abnormalities interpreted by me       Imaging Results              CT Head Without Contrast (Final result)  Result time 08/19/23 14:50:22      Final result by Shane Walton MD (08/19/23 14:50:22)                   Impression:      No acute intracranial pathology.      Electronically signed  by: Shane Walton  Date:    08/19/2023  Time:    14:50               Narrative:    EXAMINATION:  CT HEAD WITHOUT CONTRAST    CLINICAL HISTORY:  Dizziness, persistent/recurrent, cardiac or vascular cause suspected;    TECHNIQUE:  Low dose axial CT images obtained throughout the head without intravenous contrast. Sagittal and coronal reconstructions were performed.    COMPARISON:  CT head without contrast 03/08/2017.    FINDINGS:  Intracranial compartment:    Prominence of the ventricles and sulci, in keeping with generalized cerebral volume loss.  No extra-axial blood or fluid collections.    Scattered patchy regions of subcortical and periventricular hypoattenuation, overall nonspecific, but can be seen in setting of microvascular ischemic change.  No parenchymal mass, hemorrhage, edema or major vascular distribution infarct.    Skull/extracranial contents (limited evaluation): No fracture. Patchy mucosal thickening involving the paranasal sinuses.  Mastoid air cells are essentially clear.  Periapical lucency involving a right maxillary molar.  Bilateral lens replacement.    Scattered intracranial vascular calcification.                                       X-Ray Chest AP Portable (Final result)  Result time 08/19/23 14:37:54      Final result by Stanton Edwards MD (08/19/23 14:37:54)                   Impression:      1. No acute cardiopulmonary process noting stable amorphous focus of calcification projected over the lateral aspect of the right mid to lower lung zone.      Electronically signed by: Stanton Edwards MD  Date:    08/19/2023  Time:    14:37               Narrative:    EXAMINATION:  XR CHEST AP PORTABLE    CLINICAL HISTORY:  dizziness;    TECHNIQUE:  Single frontal view of the chest was performed.    COMPARISON:  12/09/2022    FINDINGS:  The cardiomediastinal silhouette is not enlarged.  There is no pleural effusion.  The trachea is midline.  The lungs are symmetrically expanded bilaterally with  coarse interstitial attenuation bilaterally.  There is a amorphous calcific focus projected over the lateral aspect of the right midlung zone..  No large focal consolidation seen.  There is no pneumothorax.  The osseous structures are remarkable for degenerative changes noting osteopenia..                                       Medications   lactated ringers bolus 500 mL (500 mLs Intravenous New Bag 8/19/23 1522)   meclizine tablet 25 mg (25 mg Oral Given 8/19/23 1522)     Medical Decision Making  CT head shows no acute findings.  The patient states that she feels better after IV fluids.  She states that she has gotten up to use the bathroom a few times and the dizziness is resolved.  We discussed her kidney function.  She was recently placed on spironolactone.  I instructed her to drink more fluids.  She will discuss spironolactone with her pcp when she gets her BUN/Cr checked which I instructed to do within one week.  She is to return if this is not possible.  She is tolerating fluids.  She is instructed to return immediately for any new or worsening symptoms and she and daughter verbalized understanding.    Amount and/or Complexity of Data Reviewed  Labs: ordered. Decision-making details documented in ED Course.  Radiology: ordered. Decision-making details documented in ED Course.               ED Course as of 08/20/23 0745   Sat Aug 19, 2023   1426 CBC auto differential(!)  nonspecific [CD]   1440 Comprehensive metabolic panel(!)  salvador [CD]   1441 Urinalysis, Reflex to Urine Culture Urine, Clean Catch(!)  No uti [CD]   1443 Troponin I  wnl [CD]   1443 Brain natriuretic peptide  wnl [CD]   1446 X-Ray Chest AP Portable  No acute findings [CD]   1631 Pt states that she was able to ambulate to bathroom with no difficulty, states she feels better. [CD]      ED Course User Index  [CD] Francisco Frank DO                    Clinical Impression:   Final diagnoses:  [R42] Dizziness (Primary)        ED Disposition  Condition    Discharge Stable          ED Prescriptions       Medication Sig Dispense Start Date End Date Auth. Provider    meclizine (ANTIVERT) 25 mg tablet Take 1 tablet (25 mg total) by mouth 3 (three) times daily as needed for Dizziness. 6 tablet 8/19/2023 -- Francisco Frank, DO          Follow-up Information       Follow up With Specialties Details Why Contact Info    Tomas eBltran MD Internal Medicine Schedule an appointment as soon as possible for a visit   [address] [facility] [address]  [phone]               Francisco Frank,   08/20/23 0755

## 2023-08-23 ENCOUNTER — CLINICAL SUPPORT (OUTPATIENT)
Dept: REHABILITATION | Facility: HOSPITAL | Age: 88
End: 2023-08-23
Payer: MEDICARE

## 2023-08-23 DIAGNOSIS — R26.2 DIFFICULTY WALKING: Primary | ICD-10-CM

## 2023-08-23 PROCEDURE — 97110 THERAPEUTIC EXERCISES: CPT | Mod: PO,CQ

## 2023-08-23 NOTE — PROGRESS NOTES
[facility]   Physical Therapy Treatment Note      Name: Gerda Lai  Clinic Number: [id number]    Therapy Diagnosis:   Encounter Diagnosis   Name Primary?    Difficulty walking Yes           Physician: Milton Cobian III, *    Visit Date: 8/23/2023    Physician Orders: PT Eval and Treat, R knee Note:    Eval and treat with modalities: s/p R TKA, focus on general gait training/balance/stability, does not need aggressive R TKA rehab (would normally be done with knee PT) 2x/week x4 weeks  Medical Diagnosis from Referral: Status post right knee replacement [Z96.651]  Evaluation Date: 7/12/2023  Authorization Period Expiration: 7/5/2024  Plan of Care Expiration: 10/12/2024  Progress Note Due: 9/9/2023   Visit # / Visits authorized: 9/ 40   FOTO: 2/3     Precautions: Standard  and R TKA      Time In: 1:15 pm  Time Out: 2:00 pm  Total Billable Time: 45 minutes    PTA Visit #: 1/5       Subjective     Pt reports: she went to the hospital with a heaviness in her head and she was dehydrated.   She was compliant with home exercise program.    SP RIGHT TKA; DOS 5/22/2023     Response to previous treatment: good response   Functional change: ongoing    Pain: 0/10  Location: na    Objective      Objective Measures updated at progress report unless specified.   Updated 8/9/2023        Range of Motion (Active):   Knee Right  Left    Flexion 115 123   Extension 0 0            Treatment     Gerda received the treatments listed below:      therapeutic exercises to develop strength, endurance, ROM, flexibility, posture, and core stabilization for 45 minutes including:    BOLD exercises =Performed today       Recumbent bike full revolutions 10 minutes, 00 resistance  Side stepping with SPC, SBA 2 x 10  Step ups, 4 inch , 3 x 5, unilat UE support and SBA   Seated LAQ 2x10  SLR 2x10  SAQ 2x10  Gait training CGA, no AD.  100 ft       Patient Education and Home Exercises       Education provided:   - HEP  verbalized    Written Home Exercises Provided: Patient instructed to cont prior HEP. Exercises were reviewed and Gerda was able to demonstrate them prior to the end of the session.  Gerda demonstrated good  understanding of the education provided. See EMR under Patient Instructions for exercises provided during therapy sessions    Assessment     Gerda presents to treatment ambulating with SPC. She reports going to the emergency room last week due to dehydration. She reports feeling better, but she reports she is not back to where she was and had difficulty tolerating multiple standing balance activities today. Exercises were modified to include seated and supine exercises today due to increased fatigue. Continue to progress and add back balance and gait training as tolerated next session.     Gerda Is progressing well towards her goals.   Pt prognosis is Excellent.     Pt will continue to benefit from skilled outpatient physical therapy to address the deficits listed in the problem list box on initial evaluation, provide pt/family education and to maximize pt's level of independence in the home and community environment.     Pt's spiritual, cultural and educational needs considered and pt agreeable to plan of care and goals.     Anticipated barriers to physical therapy: none      Goals:  Short Term Goals:   Pt demonstrate indep with initial hep 4 weeks (MET)  Pt demonstrate equalized knee arom to normalize gait  4 weeks (ONGOING)     Long Term Goals:  Pt demonstrate tug test 13 sec or less to decrease fall risk  8 weeks (ONGOING)  Pt demonstrate 5 x sit to stand 13 secs or less to improve LE strength for community ambulation  8 weeks (ONGOING)    Plan       Plan of care Certification: 7/12/2023 to 10/12/2023.     Outpatient Physical Therapy 2 times weekly for 6 weeks to include the following interventions: Gait Training, Manual Therapy, Moist Heat/ Ice, Neuromuscular Re-ed, Patient Education, Self Care,  Therapeutic Activities, and Therapeutic Exercise.    Nancy Dsouza, PTA

## 2023-08-25 ENCOUNTER — OFFICE VISIT (OUTPATIENT)
Dept: INTERNAL MEDICINE | Facility: CLINIC | Age: 88
End: 2023-08-25
Payer: MEDICARE

## 2023-08-25 VITALS
HEART RATE: 62 BPM | RESPIRATION RATE: 12 BRPM | HEIGHT: 62 IN | SYSTOLIC BLOOD PRESSURE: 118 MMHG | DIASTOLIC BLOOD PRESSURE: 64 MMHG | TEMPERATURE: 99 F | BODY MASS INDEX: 25.64 KG/M2 | WEIGHT: 139.31 LBS

## 2023-08-25 DIAGNOSIS — E86.0 DEHYDRATION: ICD-10-CM

## 2023-08-25 DIAGNOSIS — E78.5 HYPERLIPIDEMIA, UNSPECIFIED HYPERLIPIDEMIA TYPE: Chronic | ICD-10-CM

## 2023-08-25 DIAGNOSIS — R42 DIZZINESS: Primary | ICD-10-CM

## 2023-08-25 DIAGNOSIS — I70.0 AORTIC ATHEROSCLEROSIS: Chronic | ICD-10-CM

## 2023-08-25 DIAGNOSIS — I10 HYPERTENSION, ESSENTIAL: Chronic | ICD-10-CM

## 2023-08-25 DIAGNOSIS — I65.23 BILATERAL CAROTID ARTERY STENOSIS: ICD-10-CM

## 2023-08-25 PROCEDURE — 99999 PR PBB SHADOW E&M-EST. PATIENT-LVL III: CPT | Mod: PBBFAC,,, | Performed by: INTERNAL MEDICINE

## 2023-08-25 PROCEDURE — 99213 OFFICE O/P EST LOW 20 MIN: CPT | Mod: PBBFAC,PO | Performed by: INTERNAL MEDICINE

## 2023-08-25 PROCEDURE — 99214 OFFICE O/P EST MOD 30 MIN: CPT | Mod: S$PBB,,, | Performed by: INTERNAL MEDICINE

## 2023-08-25 PROCEDURE — 99999 PR PBB SHADOW E&M-EST. PATIENT-LVL III: ICD-10-PCS | Mod: PBBFAC,,, | Performed by: INTERNAL MEDICINE

## 2023-08-25 PROCEDURE — 99214 PR OFFICE/OUTPT VISIT, EST, LEVL IV, 30-39 MIN: ICD-10-PCS | Mod: S$PBB,,, | Performed by: INTERNAL MEDICINE

## 2023-08-25 NOTE — PROGRESS NOTES
Subjective:       Patient ID: Gerda Lai is a 89 y.o. female.    Chief Complaint: Dizziness and Weakness    HPI    89-year-old female here for evaluation of dizziness and weakness.  She reports that she had her knee operated on.  Last Wednesday, she had to get up early, which is unusual.  She had to go meet someone to clean up an apartment.  She left home without eating and took her labetaolol. She had to go to the bathroom on the way home.  She stopped to get a biscuit from [employer].  She did not eat the biscuit, because it was too hard.  From then on, she had diarrhea from this point on.  She was dizzy.  She went to the ER Saturday.  She was miserable all day long. She went to the [facility] ER.  Urine was negative for infection, EKG showed sinus bradycardia. CMP stable, Her CBC, Trop, BNP were WNL.  Her CXR with no acute process noted.  Her CT head showed no acute pathology. She last took the norvasc a week ago.    Review of Systems      Objective:      Physical Exam  Vitals reviewed.   Constitutional:       Appearance: She is well-developed.   HENT:      Head: Normocephalic and atraumatic.      Mouth/Throat:      Pharynx: No oropharyngeal exudate.   Eyes:      General: No scleral icterus.        Right eye: No discharge.         Left eye: No discharge.      Pupils: Pupils are equal, round, and reactive to light.   Neck:      Thyroid: No thyromegaly.      Trachea: No tracheal deviation.   Cardiovascular:      Rate and Rhythm: Normal rate and regular rhythm.      Heart sounds: Normal heart sounds. No murmur heard.     No friction rub. No gallop.   Pulmonary:      Effort: Pulmonary effort is normal. No respiratory distress.      Breath sounds: Normal breath sounds. No wheezing or rales.   Chest:      Chest wall: No tenderness.   Abdominal:      General: Bowel sounds are normal. There is no distension.      Palpations: Abdomen is soft. There is no mass.      Tenderness: There is no abdominal tenderness. There is  no guarding or rebound.   Musculoskeletal:         General: No tenderness. Normal range of motion.      Cervical back: Normal range of motion and neck supple.   Skin:     General: Skin is warm and dry.      Coloration: Skin is not pale.      Findings: No erythema or rash.   Neurological:      Mental Status: She is alert and oriented to person, place, and time.   Psychiatric:         Behavior: Behavior normal.         Assessment:       1. Dizziness    2. Dehydration    3. Hypertension, essential      Plan:       1/2/3.  Think patient was dehydrated on top of taking labetalol.  Counseled patient to make sure she drinks when she takes for medication.  Okay to stop amlodipine.  Continue monitoring with digital hypertension program.

## 2023-08-26 RX ORDER — PRAVASTATIN SODIUM 80 MG/1
80 TABLET ORAL DAILY
Qty: 90 TABLET | Refills: 3 | Status: SHIPPED | OUTPATIENT
Start: 2023-08-26

## 2023-08-29 ENCOUNTER — OFFICE VISIT (OUTPATIENT)
Dept: HOME HEALTH SERVICES | Facility: CLINIC | Age: 88
End: 2023-08-29
Payer: MEDICARE

## 2023-08-29 VITALS
OXYGEN SATURATION: 96 % | HEART RATE: 84 BPM | HEIGHT: 62 IN | RESPIRATION RATE: 16 BRPM | TEMPERATURE: 97 F | BODY MASS INDEX: 25.21 KG/M2 | WEIGHT: 137 LBS | SYSTOLIC BLOOD PRESSURE: 128 MMHG | DIASTOLIC BLOOD PRESSURE: 60 MMHG

## 2023-08-29 DIAGNOSIS — Z00.00 ENCOUNTER FOR PREVENTIVE HEALTH EXAMINATION: Primary | ICD-10-CM

## 2023-08-29 DIAGNOSIS — I70.0 AORTIC ATHEROSCLEROSIS: Chronic | ICD-10-CM

## 2023-08-29 DIAGNOSIS — M85.80 OSTEOPENIA, UNSPECIFIED LOCATION: ICD-10-CM

## 2023-08-29 DIAGNOSIS — N18.32 STAGE 3B CHRONIC KIDNEY DISEASE: Chronic | ICD-10-CM

## 2023-08-29 DIAGNOSIS — R26.9 ABNORMALITY OF GAIT AND MOBILITY: ICD-10-CM

## 2023-08-29 DIAGNOSIS — H90.3 SENSORINEURAL HEARING LOSS (SNHL) OF BOTH EARS: ICD-10-CM

## 2023-08-29 DIAGNOSIS — I77.1 SUBCLAVIAN ARTERY STENOSIS, RIGHT: ICD-10-CM

## 2023-08-29 DIAGNOSIS — I65.23 BILATERAL CAROTID ARTERY STENOSIS: Chronic | ICD-10-CM

## 2023-08-29 DIAGNOSIS — I10 HYPERTENSION, ESSENTIAL: Chronic | ICD-10-CM

## 2023-08-29 DIAGNOSIS — Z99.89 DEPENDENCE ON OTHER ENABLING MACHINES AND DEVICES: ICD-10-CM

## 2023-08-29 DIAGNOSIS — E78.5 HYPERLIPIDEMIA, UNSPECIFIED HYPERLIPIDEMIA TYPE: Chronic | ICD-10-CM

## 2023-08-29 PROCEDURE — G0439 PR MEDICARE ANNUAL WELLNESS SUBSEQUENT VISIT: ICD-10-PCS | Mod: S$GLB,,,

## 2023-08-29 PROCEDURE — G0439 PPPS, SUBSEQ VISIT: HCPCS | Mod: S$GLB,,,

## 2023-08-29 NOTE — PATIENT INSTRUCTIONS
Counseling and Referral of Other Preventative  (Italic type indicates deductible and co-insurance are waived)    Patient Name: Gerda Lai  Today's Date: 8/29/2023    Health Maintenance       Date Due Completion Date    Shingles Vaccine (1 of 2) Never done ---    COVID-19 Vaccine (4 - Pfizer series) 12/28/2021 11/2/2021    Influenza Vaccine (1) 09/01/2023 10/25/2022    DEXA Scan 01/28/2024 1/28/2022    TETANUS VACCINE 08/04/2025 8/4/2015    Lipid Panel 03/22/2028 3/22/2023        No orders of the defined types were placed in this encounter.    The following information is provided to all patients.  This information is to help you find resources for any of the problems found today that may be affecting your health:                Living healthy guide: www.[facility].[place].[facility]      Understanding Diabetes: www.diabetes.org      Eating healthy: www.cdc.gov/healthyweight      CDC home safety checklist: www.cdc.gov/steadi/patient.html      Agency on Aging: www.goea.[place].[facility]      Alcoholics anonymous (AA): www.aa.org      Physical Activity: www.jose enrique.nih.gov/yx7qkgc      Tobacco use: www.quitwithusla.org

## 2023-08-29 NOTE — PROGRESS NOTES
"Gerda Lai presented for a  Medicare AWV and comprehensive Health Risk Assessment today. The following components were reviewed and updated:    Medical history  Family History  Social history  Allergies and Current Medications  Health Risk Assessment  Health Maintenance  Care Team         ** See Completed Assessments for Annual Wellness Visit within the encounter summary.**         The following assessments were completed:  Living Situation  CAGE  Depression Screening  Timed Get Up and Go: Deferred, impaired mobility  Whisper Test: Deferred, hearing impaired  Cognitive Function Screening    Nutrition Screening  ADL Screening  PAQ Screening        Vitals:    08/29/23 1202   BP: 128/60   BP Location: Left arm   Patient Position: Sitting   Pulse: 84   Resp: 16   Temp: 97 °F (36.1 °C)   SpO2: 96%   Weight: 62.1 kg (137 lb)   Height: 5' 2" (1.575 m)     Body mass index is 25.06 kg/m².    Physical Exam  Vitals reviewed.   Constitutional:       General: She is not in acute distress.     Appearance: Normal appearance.   HENT:      Right Ear: Decreased hearing noted.      Left Ear: Decreased hearing noted.   Cardiovascular:      Rate and Rhythm: Normal rate and regular rhythm.      Pulses: Normal pulses.      Heart sounds: No murmur heard.  Pulmonary:      Effort: Pulmonary effort is normal. No respiratory distress.      Breath sounds: Normal breath sounds.   Abdominal:      General: Bowel sounds are normal.   Musculoskeletal:      Right lower leg: No edema.      Left lower leg: No edema.   Neurological:      General: No focal deficit present.      Mental Status: She is alert and oriented to person, place, and time.      Gait: Gait abnormal.   Psychiatric:         Mood and Affect: Mood normal.         Behavior: Behavior normal.               Diagnoses and health risks identified today and associated recommendations/orders:    1. Encounter for preventive health examination  Assessments completed.   recommendations " reviewed.  F/u with PCP as instructed.     Patient not on chronic opioids.   Risk factors reviewed for any potential opioid use disorder   Pain evaluated during visit.  Current treatment plan documented.  Will refer to specialist, as appropriate.      2. Stage 3b chronic kidney disease  Chronic, stable on current regimen. Followed by PCP.     3. Aortic atherosclerosis  Chronic, stable on current statin regimen. Followed by PCP.     4. Bilateral carotid artery stenosis  Chronic, stable on current statin regimen. Followed by PCP.     5. Subclavian artery stenosis, right  Chronic, stable on current statin regimen. Followed by PCP.     6. Hypertension, essential  Chronic, stable on current Labetalol regimen. Followed by PCP.     7. Hyperlipidemia, unspecified hyperlipidemia type  Chronic, stable on current statin regimen. Followed by PCP.     8. Osteopenia, unspecified location  Chronic, stable on current Vit D regimen. Followed by PCP.    9. Sensorineural hearing loss (SNHL) of both ears  Wears hearing aids.     10. Abnormality of gait and mobility  Uses wheeled walker.     11. Dependence on other enabling machines and devices  Uses wheeled walker and hearing aids.       Provided Gerda with a 5-10 year written screening schedule and personal prevention plan. Recommendations were developed using the USPSTF age appropriate recommendations. Education, counseling, and referrals were provided as needed. After Visit Summary printed and given to patient which includes a list of additional screenings\tests needed.    Follow up in about 1 year (around 8/29/2024) for Medicare AWVJeremy Lucio NP    I offered to discuss advanced care planning, including how to pick a person who would make decisions for you if you were unable to make them for yourself, called a health care power of , and what kind of decisions you might make such as use of life sustaining treatments such as ventilators and tube feeding when  faced with a life limiting illness recorded on a living will that they will need to know. (How you want to be cared for as you near the end of your natural life)     X Patient is interested in learning more about how to make advanced directives.  I provided them paperwork and offered to discuss this with them.

## 2023-08-30 ENCOUNTER — CLINICAL SUPPORT (OUTPATIENT)
Dept: REHABILITATION | Facility: HOSPITAL | Age: 88
End: 2023-08-30
Payer: MEDICARE

## 2023-08-30 DIAGNOSIS — R26.2 DIFFICULTY WALKING: Primary | ICD-10-CM

## 2023-08-30 PROCEDURE — 97110 THERAPEUTIC EXERCISES: CPT | Mod: PO

## 2023-08-30 PROCEDURE — 97116 GAIT TRAINING THERAPY: CPT | Mod: PO

## 2023-08-30 PROCEDURE — 97112 NEUROMUSCULAR REEDUCATION: CPT | Mod: PO

## 2023-08-30 NOTE — PROGRESS NOTES
[facility]   Physical Therapy Treatment Note      Name: Gerda Lai  Clinic Number: [id number]    Therapy Diagnosis:   Encounter Diagnosis   Name Primary?    Difficulty walking Yes           Physician: Milton Cobian III, *    Visit Date: 8/30/2023    Physician Orders: PT Eval and Treat, R knee Note:    Eval and treat with modalities: s/p R TKA, focus on general gait training/balance/stability, does not need aggressive R TKA rehab (would normally be done with knee PT) 2x/week x4 weeks  Medical Diagnosis from Referral: Status post right knee replacement [Z96.651]  Evaluation Date: 7/12/2023  Authorization Period Expiration: 7/5/2024  Plan of Care Expiration: 10/12/2024  Progress Note Due: 9/9/2023   Visit # / Visits authorized: 10/ 40   FOTO: 2/3     Precautions: Standard  and R TKA      Time In: 2:00 pm  Time Out: 2:53 pm  Total Billable Time: 53 minutes    PTA Visit #: 1/5       Subjective     Pt reports: she is doing much better.   She was compliant with home exercise program.    SP RIGHT TKA; DOS 5/22/2023     Response to previous treatment: good response   Functional change: ongoing    Pain: 0/10  Location: na    Objective      Objective Measures updated at progress report unless specified.   Updated 8/9/2023        Range of Motion (Active):   Knee Right  Left    Flexion 115 123   Extension 0 0            Treatment     Gerda received the treatments listed below:      therapeutic exercises to develop strength, endurance, ROM, flexibility, posture, and core stabilization for 18 minutes including:  Recumbent bike full revolutions 10 minutes, 00 resistance  Seated LAQ 2x10    Gait training 10 minutes   50 ft x 3, SBA, without SPC     Neuro-muscular Re-education : 25 minutes   Side stepping with SPC, SBA 3 x 10  Step ups, 4 inch , 3 x 5, unilat UE support and SBA   Heel slides supine 3 x 10           Patient Education and Home Exercises       Education provided:   - HEP  verbalized    Written Home Exercises Provided: Patient instructed to cont prior HEP. Exercises were reviewed and Gerda was able to demonstrate them prior to the end of the session.  Gerda demonstrated good  understanding of the education provided. See EMR under Patient Instructions for exercises provided during therapy sessions    Assessment       Pt demonstrates good response to current interventions. She reports improved energy and strength today. Pt demonstrates 0-120 degrees right knee active range of motion. Pt requires moderate rest breaks in between her exercises due to fatigue. Pt is currently ambulating with SPC. Gait training without assistive device SBA was performed to challenge the patient. She demonstrates abnormal gait and decreased confidence with this challenge. Pt is to continue using SPC for ambulation.     Gerda Is progressing well towards her goals.   Pt prognosis is Excellent.     Pt will continue to benefit from skilled outpatient physical therapy to address the deficits listed in the problem list box on initial evaluation, provide pt/family education and to maximize pt's level of independence in the home and community environment.     Pt's spiritual, cultural and educational needs considered and pt agreeable to plan of care and goals.     Anticipated barriers to physical therapy: none      Goals:  Short Term Goals:   Pt demonstrate indep with initial hep 4 weeks (MET)  Pt demonstrate equalized knee arom to normalize gait  4 weeks (ONGOING)     Long Term Goals:  Pt demonstrate tug test 13 sec or less to decrease fall risk  8 weeks (ONGOING)  Pt demonstrate 5 x sit to stand 13 secs or less to improve LE strength for community ambulation  8 weeks (ONGOING)    Plan       Plan of care Certification: 7/12/2023 to 10/12/2023.     Outpatient Physical Therapy 2 times weekly for 6 weeks to include the following interventions: Gait Training, Manual Therapy, Moist Heat/ Ice, Neuromuscular Re-ed,  Patient Education, Self Care, Therapeutic Activities, and Therapeutic Exercise.    Carlos Bravo, PT

## 2023-09-01 ENCOUNTER — CLINICAL SUPPORT (OUTPATIENT)
Dept: REHABILITATION | Facility: HOSPITAL | Age: 88
End: 2023-09-01
Payer: MEDICARE

## 2023-09-01 DIAGNOSIS — R26.2 DIFFICULTY WALKING: Primary | ICD-10-CM

## 2023-09-01 PROCEDURE — 97116 GAIT TRAINING THERAPY: CPT | Mod: PO

## 2023-09-01 PROCEDURE — 97112 NEUROMUSCULAR REEDUCATION: CPT | Mod: PO

## 2023-09-01 PROCEDURE — 97110 THERAPEUTIC EXERCISES: CPT | Mod: PO

## 2023-09-01 NOTE — PROGRESS NOTES
[facility]   Physical Therapy Treatment Note      Name: Gerda Lai  Clinic Number: [id number]    Therapy Diagnosis:   Encounter Diagnosis   Name Primary?    Difficulty walking Yes       Physician: Milton Cobian III, *    Visit Date: 9/1/2023    Physician Orders: PT Eval and Treat, R knee Note:    Eval and treat with modalities: s/p R TKA, focus on general gait training/balance/stability, does not need aggressive R TKA rehab (would normally be done with knee PT) 2x/week x4 weeks  Medical Diagnosis from Referral: Status post right knee replacement [Z96.651]  Evaluation Date: 7/12/2023  Authorization Period Expiration: 7/5/2024  Plan of Care Expiration: 10/12/2024  Progress Note Due: 9/9/2023   Visit # / Visits authorized: 11/ 40   FOTO: 2/3     Precautions: Standard  and R TKA      Time In: 11:06 a  Time Out: 11:59 a  Total Billable Time: 53 minutes    PTA Visit #: 0/5       Subjective     Pt reports: she is doing good.   She was compliant with home exercise program.    SP RIGHT TKA; DOS 5/22/2023     Response to previous treatment: ongoing   Functional change: ongoing    Pain: 0/10  Location: na    Objective      Objective Measures updated at progress report unless specified.   Updated 8/9/2023        Range of Motion (Active):   Knee Right  Left    Flexion 115 123   Extension 0 0            Treatment     Gerda received the treatments listed below:      therapeutic exercises to develop strength, endurance, ROM, flexibility, posture, and core stabilization for 15 minutes including:  Recumbent bike full revolutions 10 minutes, 00 resistance  Seated LAQ 2x10  Seated hip abd Red 3 x 10     Gait training 8 minutes   50 ft x 4, SBA, without SPC     Neuro-muscular Re-education : 30 minutes   Side stepping with SPC, SBA 3 x 10  Step ups, 4 inch , 3 x 5, unilat UE support and SBA   Heel slides supine 3 x 10   + lateral step ups, 4 inch 3, 3x5, bilat UE support and SBA      Patient Education  and Home Exercises       Education provided:   - HEP verbalized    Written Home Exercises Provided: Patient instructed to cont prior HEP. Exercises were reviewed and Gerda was able to demonstrate them prior to the end of the session.  Gerda demonstrated good  understanding of the education provided. See EMR under Patient Instructions for exercises provided during therapy sessions    Assessment     Pt demonstrates good response to current interventions and is making positive progress toward her physical therapy goals. Pt demonstrates mild improvement with stability during gait without SPC, but still decreased speed and lacks confidence. Pt requires rest breaks in between exercises but endurance appears to be improving. Pt will continue to benefit from balance training and LE strengthening to improve her walking tolerance. Plan to progress per surgical protocol.     Gerda Is progressing well towards her goals.   Pt prognosis is Excellent.     Pt will continue to benefit from skilled outpatient physical therapy to address the deficits listed in the problem list box on initial evaluation, provide pt/family education and to maximize pt's level of independence in the home and community environment.     Pt's spiritual, cultural and educational needs considered and pt agreeable to plan of care and goals.     Anticipated barriers to physical therapy: none      Goals:  Short Term Goals:   Pt demonstrate indep with initial hep 4 weeks (MET)  Pt demonstrate equalized knee arom to normalize gait  4 weeks (ONGOING)     Long Term Goals:  Pt demonstrate tug test 13 sec or less to decrease fall risk  8 weeks (ONGOING)  Pt demonstrate 5 x sit to stand 13 secs or less to improve LE strength for community ambulation  8 weeks (ONGOING)    Plan       Plan of care Certification: 7/12/2023 to 10/12/2023.     Outpatient Physical Therapy 2 times weekly for 6 weeks to include the following interventions: Gait Training, Manual Therapy,  Moist Heat/ Ice, Neuromuscular Re-ed, Patient Education, Self Care, Therapeutic Activities, and Therapeutic Exercise.    Carlos Bravo, PT

## 2023-09-06 ENCOUNTER — CLINICAL SUPPORT (OUTPATIENT)
Dept: REHABILITATION | Facility: HOSPITAL | Age: 88
End: 2023-09-06
Payer: MEDICARE

## 2023-09-06 DIAGNOSIS — R26.2 DIFFICULTY WALKING: Primary | ICD-10-CM

## 2023-09-06 PROCEDURE — 97116 GAIT TRAINING THERAPY: CPT | Mod: PO

## 2023-09-06 PROCEDURE — 97110 THERAPEUTIC EXERCISES: CPT | Mod: PO

## 2023-09-06 PROCEDURE — 97112 NEUROMUSCULAR REEDUCATION: CPT | Mod: PO

## 2023-09-06 NOTE — PROGRESS NOTES
[facility]   Physical Therapy Treatment Note      Name: Gerda Lai  Clinic Number: [id number]    Therapy Diagnosis:   Encounter Diagnosis   Name Primary?    Difficulty walking Yes       Physician: Milton Cobian III, *    Visit Date: 9/6/2023    Physician Orders: PT Eval and Treat, R knee Note:    Eval and treat with modalities: s/p R TKA, focus on general gait training/balance/stability, does not need aggressive R TKA rehab (would normally be done with knee PT) 2x/week x4 weeks  Medical Diagnosis from Referral: Status post right knee replacement [Z96.651]  Evaluation Date: 7/12/2023  Authorization Period Expiration: 7/5/2024  Plan of Care Expiration: 10/12/2024  Progress Note Due: 9/9/2023   Visit # / Visits authorized: 12/ 40   FOTO: 2/3     Precautions: Standard  and R TKA      Time In: 5:00 p  Time Out: 5:53 p   Total Billable Time: 53 minutes    PTA Visit #: 0/5       Subjective     Pt reports: she is feeling fine   She was compliant with home exercise program.    SP RIGHT TKA; DOS 5/22/2023     Response to previous treatment: ongoing   Functional change: ongoing    Pain: 0/10  Location: na    Objective      Objective Measures updated at progress report unless specified.   Updated 8/9/2023        Range of Motion (Active):   Knee Right  Left    Flexion 115 123   Extension 0 0            Treatment     Gerda received the treatments listed below:      therapeutic exercises to develop strength, endurance, ROM, flexibility, posture, and core stabilization for 15 minutes including:  Recumbent bike full revolutions 10 minutes, 00 resistance  Seated LAQ 2x10  Seated hip abd Red 3 x 10 (not today)    Gait training 8 minutes   100 ft x 2, SBA, without SPC     Neuro-muscular Re-education : 30 minutes   Side stepping with SPC, SBA 4 x 10  Step ups, 4 inch , 3 x 5, unilat UE support and SBA   Heel slides supine 3 x 10   + lateral step ups, 4 inch 3, 3x5, bilat UE support and SBA      Patient  Education and Home Exercises       Education provided:   - HEP verbalized    Written Home Exercises Provided: Patient instructed to cont prior HEP. Exercises were reviewed and Gerda was able to demonstrate them prior to the end of the session.  Gerda demonstrated good  understanding of the education provided. See EMR under Patient Instructions for exercises provided during therapy sessions    Assessment     Pt demonstrates good tolerance to current interventions. She demonstrates overall improving LE strength with activities like stair navigation. Pt demonstrates mild carry over between visits with improved stability during gait with AD. Recommended continued use of SPC d/t lack of confidence and mild instability. Plan to progress per surgical protocol.     Gerda Is progressing well towards her goals.   Pt prognosis is Excellent.     Pt will continue to benefit from skilled outpatient physical therapy to address the deficits listed in the problem list box on initial evaluation, provide pt/family education and to maximize pt's level of independence in the home and community environment.     Pt's spiritual, cultural and educational needs considered and pt agreeable to plan of care and goals.     Anticipated barriers to physical therapy: none      Goals:  Short Term Goals:   Pt demonstrate indep with initial hep 4 weeks (MET)  Pt demonstrate equalized knee arom to normalize gait  4 weeks (ONGOING)     Long Term Goals:  Pt demonstrate tug test 13 sec or less to decrease fall risk  8 weeks (ONGOING)  Pt demonstrate 5 x sit to stand 13 secs or less to improve LE strength for community ambulation  8 weeks (ONGOING)    Plan   Next visit: progress note     Plan of care Certification: 7/12/2023 to 10/12/2023.     Outpatient Physical Therapy 2 times weekly for 6 weeks to include the following interventions: Gait Training, Manual Therapy, Moist Heat/ Ice, Neuromuscular Re-ed, Patient Education, Self Care, Therapeutic  Activities, and Therapeutic Exercise.    Carlos Bravo, PT

## 2023-09-07 ENCOUNTER — PATIENT MESSAGE (OUTPATIENT)
Dept: INTERNAL MEDICINE | Facility: CLINIC | Age: 88
End: 2023-09-07
Payer: MEDICARE

## 2023-09-08 ENCOUNTER — CLINICAL SUPPORT (OUTPATIENT)
Dept: REHABILITATION | Facility: HOSPITAL | Age: 88
End: 2023-09-08
Payer: MEDICARE

## 2023-09-08 DIAGNOSIS — R26.2 DIFFICULTY WALKING: Primary | ICD-10-CM

## 2023-09-08 PROCEDURE — 97140 MANUAL THERAPY 1/> REGIONS: CPT | Mod: PO

## 2023-09-08 PROCEDURE — 97112 NEUROMUSCULAR REEDUCATION: CPT | Mod: PO

## 2023-09-08 PROCEDURE — 97110 THERAPEUTIC EXERCISES: CPT | Mod: PO

## 2023-09-08 NOTE — PROGRESS NOTES
[facility]   Physical Therapy Treatment Note      Name: Gerda Lai  Clinic Number: [id number]    Therapy Diagnosis:   Encounter Diagnosis   Name Primary?    Difficulty walking Yes         Physician: Milton Cobian III, *    Visit Date: 9/8/2023    Physician Orders: PT Eval and Treat, R knee Note:    Eval and treat with modalities: s/p R TKA, focus on general gait training/balance/stability, does not need aggressive R TKA rehab (would normally be done with knee PT) 2x/week x4 weeks  Medical Diagnosis from Referral: Status post right knee replacement [Z96.651]  Evaluation Date: 7/12/2023  Authorization Period Expiration: 7/5/2024  Plan of Care Expiration: 10/12/2024  Progress Note Due: 10/8/2023   Visit # / Visits authorized: 13/ 40   FOTO: 2/3     Precautions: Standard  and R TKA      Time In: 4:00 p  Time Out: 4:53 p   Total Billable Time: 53 minutes    PTA Visit #: 0/5       Subjective     Pt reports: she is doing good. She is not having much pain. She wants to be able to walk without the cane.   She was compliant with home exercise program.    SP RIGHT TKA; DOS 5/22/2023     Response to previous treatment: ongoing   Functional change: ongoing    Pain: 0/10  Location: na    Objective      Objective Measures updated at progress report unless specified.   Updated 9/8/2023      KNEE AROM in deg    RIGHT 0 -123  LEFT 0-123    MOBILITY   RIGHT   PF 3/6  sup/inf    FXN TESTS   Tug : 19.14s, SPC   5 x sit to stand: min UE usage 16.66s     R KNEE MMT   Flex 5/5  Ext 5/5     GAIT  Decreased stride and speed with SPC  Without SPC, decreased stride and speed, WBOS, lacking coordination, infrequent need to stop.       Treatment     Gerda received the treatments listed below:      therapeutic exercises to develop strength, endurance, ROM, flexibility, posture, and core stabilization for 20 minutes including:  Recumbent bike full revolutions 10 minutes, 00 resistance  Seated LAQ 2x10 (not  today)  Seated hip abd Red 3 x 10 (not today)  + objective testing    Neuro-muscular Re-education : 25 minutes   Side stepping with SPC, SBA 4 x 10  Step ups, 4 inch , 3 x 5, unilat UE support and SBA (not today)  Heel slides supine 3 x 10 (not today)   lateral step ups, 4 inch 3, 3x5, bilat UE support and SBA (not today)  + staggered stance weight shifting 3 x 10, next to table support   +Mini squats 3 x 10 next to table support   Gait training 150 ft x 2, SBA, without SPC     Manual therapy: 8 mins     R KNEE PROM   PF mobs sup/inf grade I.II.       Patient Education and Home Exercises       Education provided:   - HEP verbalized    Written Home Exercises Provided: Patient instructed to cont prior HEP. Exercises were reviewed and Gedra was able to demonstrate them prior to the end of the session.  Gerda demonstrated good  understanding of the education provided. See EMR under Patient Instructions for exercises provided during therapy sessions    Assessment     At progress note, pt is approximately 15 weeks SP RIGHT TKA. She demonstrates positive response to physical therapy and is working towards her goals. She demonstrates equalized knee arom and is reporting zero to minimal pain levels. Pt is currently ambulating with SPC and has goals of walking without an AD. She will benefit from progressive physical therapy to improve her overall functional mobility, walking tolerance and to establish indep with her poc.     Gerda Is progressing well towards her goals.   Pt prognosis is Excellent.     Pt will continue to benefit from skilled outpatient physical therapy to address the deficits listed in the problem list box on initial evaluation, provide pt/family education and to maximize pt's level of independence in the home and community environment.     Pt's spiritual, cultural and educational needs considered and pt agreeable to plan of care and goals.     Anticipated barriers to physical therapy:  none      Goals:  Short Term Goals:   Pt demonstrate indep with initial hep 4 weeks (MET)  Pt demonstrate equalized knee arom to normalize gait  4 weeks (MET)     Long Term Goals:  Pt demonstrate tug test 13 sec or less to decrease fall risk  8 weeks (ONGOING)  Pt demonstrate 5 x sit to stand 13 secs or less to improve LE strength for community ambulation  8 weeks (ONGOING)    Plan   Next visit: progress note     Plan of care Certification: 7/12/2023 to 10/12/2023.     Outpatient Physical Therapy 2 times weekly for 6 weeks to include the following interventions: Gait Training, Manual Therapy, Moist Heat/ Ice, Neuromuscular Re-ed, Patient Education, Self Care, Therapeutic Activities, and Therapeutic Exercise.    Carlos Bravo, PT

## 2023-09-13 ENCOUNTER — CLINICAL SUPPORT (OUTPATIENT)
Dept: REHABILITATION | Facility: HOSPITAL | Age: 88
End: 2023-09-13
Payer: MEDICARE

## 2023-09-13 DIAGNOSIS — G89.29 CHRONIC BILATERAL LOW BACK PAIN WITHOUT SCIATICA: Primary | ICD-10-CM

## 2023-09-13 DIAGNOSIS — M54.50 CHRONIC BILATERAL LOW BACK PAIN WITHOUT SCIATICA: Primary | ICD-10-CM

## 2023-09-13 DIAGNOSIS — R26.2 DIFFICULTY WALKING: Primary | ICD-10-CM

## 2023-09-13 PROCEDURE — 97811 ACUP 1/> W/O ESTIM EA ADD 15: CPT

## 2023-09-13 PROCEDURE — 97810 ACUP 1/> WO ESTIM 1ST 15 MIN: CPT

## 2023-09-13 PROCEDURE — 97110 THERAPEUTIC EXERCISES: CPT | Mod: PO

## 2023-09-13 PROCEDURE — 97112 NEUROMUSCULAR REEDUCATION: CPT | Mod: PO

## 2023-09-13 NOTE — PROGRESS NOTES
[facility]   Physical Therapy Treatment Note      Name: Gerda Lai  Clinic Number: [id number]    Therapy Diagnosis:   Encounter Diagnosis   Name Primary?    Difficulty walking Yes           Physician: Milton Cobian III, *    Visit Date: 9/13/2023    Physician Orders: PT Eval and Treat, R knee Note:    Eval and treat with modalities: s/p R TKA, focus on general gait training/balance/stability, does not need aggressive R TKA rehab (would normally be done with knee PT) 2x/week x4 weeks  Medical Diagnosis from Referral: Status post right knee replacement [Z96.651]  Evaluation Date: 7/12/2023  Authorization Period Expiration: 7/5/2024  Plan of Care Expiration: 10/12/2024  Progress Note Due: 10/8/2023   Visit # / Visits authorized: 14/ 40   FOTO: 2/3     Precautions: Standard  and R TKA      Time In: 2:00 p  Time Out: 2:40 p   Total Billable Time: 40 minutes    PTA Visit #: 0/5       Subjective     Pt reports: she is doing good. The amount of exercise is just right.   She was compliant with home exercise program.    SP RIGHT TKA; DOS 5/22/2023     Response to previous treatment: ongoing   Functional change: ongoing    Pain: 0/10  Location: na    Objective      Objective Measures updated at progress report unless specified.   Updated 9/8/2023      KNEE AROM in deg    RIGHT 0 -123  LEFT 0-123    MOBILITY   RIGHT   PF 3/6  sup/inf    FXN TESTS   Tug : 19.14s, SPC   5 x sit to stand: min UE usage 16.66s     R KNEE MMT   Flex 5/5  Ext 5/5     GAIT  Decreased stride and speed with SPC  Without SPC, decreased stride and speed, WBOS, lacking coordination, infrequent need to stop.       Treatment     Gerda received the treatments listed below:      therapeutic exercises to develop strength, endurance, ROM, flexibility, posture, and core stabilization for 8 minutes including:  Recumbent bike full revolutions 08 minutes, 3 resistance      Neuro-muscular Re-education : 32 minutes   Side stepping,  SBA 4 x 10  staggered stance weight shifting 3 x 10, next to table support   Mini squats 3 x 10 next to table support   Gait training 200 ft x 2, SBA, without SPC     Patient Education and Home Exercises       Education provided:   - HEP verbalized    Written Home Exercises Provided: Patient instructed to cont prior HEP. Exercises were reviewed and Gerda was able to demonstrate them prior to the end of the session.  Gerda demonstrated good  understanding of the education provided. See EMR under Patient Instructions for exercises provided during therapy sessions    Assessment     Pt demonstrates overall positive progress with physical therapy with increasing walking tolerance and LE strength. Pt is able to tolerate increased walking without SPC with SBA. One mis-step was noted today but patient self corrected; no lob noted. Plan to progress with emphasis on balance and increasing her walking tolerance. SPC recommended for ambulation.     Gerda Is progressing well towards her goals.   Pt prognosis is Excellent.     Pt will continue to benefit from skilled outpatient physical therapy to address the deficits listed in the problem list box on initial evaluation, provide pt/family education and to maximize pt's level of independence in the home and community environment.     Pt's spiritual, cultural and educational needs considered and pt agreeable to plan of care and goals.     Anticipated barriers to physical therapy: none      Goals:  Short Term Goals:   Pt demonstrate indep with initial hep 4 weeks (MET)  Pt demonstrate equalized knee arom to normalize gait  4 weeks (MET)     Long Term Goals:  Pt demonstrate tug test 13 sec or less to decrease fall risk  8 weeks (ONGOING)  Pt demonstrate 5 x sit to stand 13 secs or less to improve LE strength for community ambulation  8 weeks (ONGOING)    Plan   Next visit: progress note     Plan of care Certification: 7/12/2023 to 10/12/2023.     Outpatient Physical Therapy 2  times weekly for 6 weeks to include the following interventions: Gait Training, Manual Therapy, Moist Heat/ Ice, Neuromuscular Re-ed, Patient Education, Self Care, Therapeutic Activities, and Therapeutic Exercise.    Carlos Bravo, PT

## 2023-09-13 NOTE — PROGRESS NOTES
Acupuncture Treatment Note     Name: Gerda Lai  Clinic Number: [id number]    Traditional Chinese Medicine Diagnosis: Qi Stagnation and Blood Stasis  Physician: Andi Cisneros,*    Date of Service: 9/13/2023     Medical Diagnosis: Chronic bilateral low back pain without sciatica    Evaluation Date: 08/10/2023    Visit #/Visits authorized: 2/ 12   (medicare authorized on 07/18/2023)    Precautions: Standard    Subjective     Chief Complaint:    Chronic low back pain and chronic Neck pain,   Low back pain on right side.  Neck pain from shoulder up to neck and to base of skull on the left.     Medical necessity is demonstrated by the following IMPAIRMENTS: Medical Necessity: Decreased mobility limits day to day activities, social, and emergent situations and Decreased quality of life       Response to Previous Treatment:    It has been a month since her first session.  She developed dehydration and needed treatment. After the one treatment - hip has been better, pain / stiffness no longer radiating into thigh.  Her neck is still stiff, off and on, with a sensation at the left side. Now she has a bruise on her right shin, and her knee continues to be swollen. She no longer has pain in her knee/lower leg, but feels a lack of stability in walking. She is gradually being tapered from physical therapy.     Quality of Symptoms (Better/Worse):  some sustained lessening of pain    Other Condition/Symptoms:    - High blood pressure, being monitored, not stable    - Knee surgery in May, right knee, left knee replaced several years ago.  The knee prevents her from walking distances for now.     Objective      New Findings:    sore on lower lateral right shin    Pulse:    weak, wiry  Tongue:    no coating    Treatment      Treatment Principles:    Move Qi & Blood; stop pain    Needle Set 1 -     Acupuncture Points:    Face up -   Bilateral:  ST36, SP9, ear lara back and neck +2, GB12  Right:  needles encircling knee  +8  Left:  LI10  Centerline:    NEEDLES W/O STIM  AT:    4:45  NEEDLES W/O STIM REMOVED AT:    5:20  #NEEDLES IN:   19  #NEEDLES OUT:   19    Other Traditional Chinese Medicine Modalities:   manaka healing treatment red clip to left LI10    Recommendations:    none at this time    Education:  Patient is aware of cumulative effect of acupuncture      Assessment      Analysis of Treatment:    Gerda was relaxed after session.    Pt prognosis is Fair.     Patient will continue to benefit from acupuncture treatment to address the deficits listed in the problem list box on initial evaluation, provide patient family education and to maximize pt's level of independence in the home and community environment.     Patient's spiritual, cultural and educational needs considered and pt agreeable to plan of care and goals.     Anticipated barriers to treatment:   none    Plan     Recommend    continue with care plan.

## 2023-09-15 ENCOUNTER — OFFICE VISIT (OUTPATIENT)
Dept: DERMATOLOGY | Facility: CLINIC | Age: 88
End: 2023-09-15
Payer: MEDICARE

## 2023-09-15 DIAGNOSIS — L29.9 SCALP ITCH: Primary | ICD-10-CM

## 2023-09-15 DIAGNOSIS — L65.9 ALOPECIA: ICD-10-CM

## 2023-09-15 DIAGNOSIS — L65.0 TELOGEN EFFLUVIUM: ICD-10-CM

## 2023-09-15 PROCEDURE — 99999 PR PBB SHADOW E&M-EST. PATIENT-LVL III: ICD-10-PCS | Mod: PBBFAC,,, | Performed by: DERMATOLOGY

## 2023-09-15 PROCEDURE — 99213 OFFICE O/P EST LOW 20 MIN: CPT | Mod: S$PBB,,, | Performed by: DERMATOLOGY

## 2023-09-15 PROCEDURE — 99213 PR OFFICE/OUTPT VISIT, EST, LEVL III, 20-29 MIN: ICD-10-PCS | Mod: S$PBB,,, | Performed by: DERMATOLOGY

## 2023-09-15 PROCEDURE — 99999 PR PBB SHADOW E&M-EST. PATIENT-LVL III: CPT | Mod: PBBFAC,,, | Performed by: DERMATOLOGY

## 2023-09-15 PROCEDURE — 99213 OFFICE O/P EST LOW 20 MIN: CPT | Mod: PBBFAC,PN | Performed by: DERMATOLOGY

## 2023-09-15 NOTE — PATIENT INSTRUCTIONS
Avoid hot water     Instructed patient to use plain Head and Shoulders or Selsun blue shampoo regularly for soaks lasting at least 5 minutes or more to the scalp and or face as directed.  Regular shampoo and conditioner afterward is fine.  For suspected allergy cases, rinse away from the face and body discussed.    40 grams of protein daily     Can try saw palmetto 500 mg to 1000 mg per day.  Prefer organic version.

## 2023-09-15 NOTE — PROGRESS NOTES
Subjective:      Patient ID:  Gerda Lai is a 89 y.o. female who presents for   Chief Complaint   Patient presents with    Hair Loss     Scalp      Hair Loss - Initial  Affected locations: scalp  Severity: mild to moderate  Timing: constant      Review of Systems   Constitutional: Negative.    HENT: Negative.     Respiratory: Negative.     Musculoskeletal: Negative.    Skin:  Positive for itching (scalp).       Objective:   Physical Exam   Constitutional: She appears well-developed and well-nourished.   Neurological: She is alert and oriented to person, place, and time.   Psychiatric: She has a normal mood and affect.        Diagram Legend     Erythematous scaling macule/papule c/w actinic keratosis       Vascular papule c/w angioma      Pigmented verrucoid papule/plaque c/w seborrheic keratosis      Yellow umbilicated papule c/w sebaceous hyperplasia      Irregularly shaped tan macule c/w lentigo     1-2 mm smooth white papules consistent with Milia      Movable subcutaneous cyst with punctum c/w epidermal inclusion cyst      Subcutaneous movable cyst c/w pilar cyst      Firm pink to brown papule c/w dermatofibroma      Pedunculated fleshy papule(s) c/w skin tag(s)      Evenly pigmented macule c/w junctional nevus     Mildly variegated pigmented, slightly irregular-bordered macule c/w mildly atypical nevus      Flesh colored to evenly pigmented papule c/w intradermal nevus       Pink pearly papule/plaque c/w basal cell carcinoma      Erythematous hyperkeratotic cursted plaque c/w SCC      Surgical scar with no sign of skin cancer recurrence      Open and closed comedones      Inflammatory papules and pustules      Verrucoid papule consistent consistent with wart     Erythematous eczematous patches and plaques     Dystrophic onycholytic nail with subungual debris c/w onychomycosis     Umbilicated papule    Erythematous-base heme-crusted tan verrucoid plaque consistent with inflamed seborrheic keratosis      Erythematous Silvery Scaling Plaque c/w Psoriasis     See annotation        Assessment / Plan:        Scalp itch  Instructed patient to use plain Head and Shoulders shampoo regularly for soaks lasting at least 5 minutes or more to the scalp and or face as directed.  Regular shampoo and conditioner afterward is fine.  For suspected allergy cases, rinse away from the face and body discussed.  Proper application of medications and or care for affected area(s) and condition(s) reviewed.    Alopecia  -     Ambulatory referral/consult to Dermatology  Reviewed with patient to eat protein daily, get labs done, wash with recommended shampoo or soap for the scalp, avoid hair coloring and chemicals and hot treatments, and potentially consider topical 5% minoxidil.  Avoid hot water.  Prn labs.  Pt wishes to wait on these for now.  Can try saw palmetto 500 mg to 1000 mg per day.  Prefer organic version.  Avoid harsh chemicals, relaxers, hair coloring, and anything irritating to the scalp.  Less is more.  Less product usage for the scalp is for the best.    Telogen effluvium  Discussed with patient the etiology and pathogenesis of the disease or skin lesion(s) and possible treatments and aggravators.    Reviewed with patient different treatment options and associated risks.  Patient to watch for recurrence or flares or worsening and to call the clinic for a follow up appointment for such.  Pt reports better with less shed x 1-2 weeks recently.  Had knee surgery last may.  Can effect hair.             Follow up in about 1 year (around 9/15/2024), or if symptoms worsen or fail to improve.

## 2023-09-18 ENCOUNTER — CLINICAL SUPPORT (OUTPATIENT)
Dept: REHABILITATION | Facility: HOSPITAL | Age: 88
End: 2023-09-18
Payer: MEDICARE

## 2023-09-18 DIAGNOSIS — G89.29 CHRONIC BILATERAL LOW BACK PAIN WITHOUT SCIATICA: Primary | ICD-10-CM

## 2023-09-18 DIAGNOSIS — M54.50 CHRONIC BILATERAL LOW BACK PAIN WITHOUT SCIATICA: Primary | ICD-10-CM

## 2023-09-18 PROCEDURE — 97811 ACUP 1/> W/O ESTIM EA ADD 15: CPT

## 2023-09-18 PROCEDURE — 97810 ACUP 1/> WO ESTIM 1ST 15 MIN: CPT

## 2023-09-18 NOTE — PROGRESS NOTES
Acupuncture Treatment Note     Name: Gerda Lai  Clinic Number: [id number]    Traditional Chinese Medicine Diagnosis: Qi Stagnation and Blood Stasis  Physician: Andi Cisneros,*    Date of Service: 9/18/2023     Medical Diagnosis: Chronic bilateral low back pain without sciatica    Evaluation Date: 08/10/2023    Visit #/Visits authorized: 3 / 12   (medicare authorized on 07/18/2023)    Precautions: Standard    Subjective     Chief Complaint:    Chronic low back pain and chronic Neck pain,   Low back pain on right side.  Neck pain from shoulder up to neck and to base of skull on the left.     Medical necessity is demonstrated by the following IMPAIRMENTS: Medical Necessity: Decreased mobility limits day to day activities, social, and emergent situations and Decreased quality of life       Response to Previous Treatment:   Back pain off and on, her neck is stiff especially when she wakes in the morning. Right knee is still swollen, the bruising is down to one nickel size spot on her lateral lower shin.    Quality of Symptoms (Better/Worse):  some sustained lessening of pain    Other Condition/Symptoms:    - High blood pressure, being monitored, not stable    - Knee surgery in May, right knee, left knee replaced several years ago.  The knee prevents her from walking distances for now.     Objective      New Findings:    sore on lower lateral right shin - smaller area    Pulse:    weak, wiry  Tongue:    no coating    Treatment      Treatment Principles:    Move Qi & Blood; stop pain    Needle Set 1 -     Acupuncture Points:    Face up -   Bilateral:  ST36, SP9, ear lara back area +1  Right:  needles encircling knee +6, LI10  Left:    Centerline:    NEEDLES W/O STIM  AT:   2:45  NEEDLES W/O STIM REMOVED AT:    3:30  #NEEDLES IN:   13  #NEEDLES OUT:   13    Other Traditional Chinese Medicine Modalities:   manaka healing treatment red clip to right LI10    Recommendations:    none at this time    Education:   Patient is aware of cumulative effect of acupuncture      Assessment      Analysis of Treatment:    Gerda was relaxed after session.    Pt prognosis is Fair.     Patient will continue to benefit from acupuncture treatment to address the deficits listed in the problem list box on initial evaluation, provide patient family education and to maximize pt's level of independence in the home and community environment.     Patient's spiritual, cultural and educational needs considered and pt agreeable to plan of care and goals.     Anticipated barriers to treatment:   none    Plan     Recommend    continue with care plan.

## 2023-09-21 ENCOUNTER — CLINICAL SUPPORT (OUTPATIENT)
Dept: REHABILITATION | Facility: HOSPITAL | Age: 88
End: 2023-09-21
Payer: MEDICARE

## 2023-09-21 DIAGNOSIS — M54.50 CHRONIC BILATERAL LOW BACK PAIN WITHOUT SCIATICA: Primary | ICD-10-CM

## 2023-09-21 DIAGNOSIS — R26.2 DIFFICULTY WALKING: Primary | ICD-10-CM

## 2023-09-21 DIAGNOSIS — G89.29 CHRONIC BILATERAL LOW BACK PAIN WITHOUT SCIATICA: Primary | ICD-10-CM

## 2023-09-21 PROCEDURE — 97112 NEUROMUSCULAR REEDUCATION: CPT | Mod: PO

## 2023-09-21 PROCEDURE — 97530 THERAPEUTIC ACTIVITIES: CPT | Mod: PO

## 2023-09-21 PROCEDURE — 97811 ACUP 1/> W/O ESTIM EA ADD 15: CPT

## 2023-09-21 PROCEDURE — 97810 ACUP 1/> WO ESTIM 1ST 15 MIN: CPT

## 2023-09-21 PROCEDURE — 97110 THERAPEUTIC EXERCISES: CPT | Mod: PO
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
## 2023-09-21 NOTE — PROGRESS NOTES
Acupuncture Treatment Note     Name: Gerda Lai  Clinic Number: [id number]    Traditional Chinese Medicine Diagnosis: Qi Stagnation and Blood Stasis  Physician: Andi Cisneros,*    Date of Service: 9/21/2023     Medical Diagnosis: Chronic bilateral low back pain without sciatica    Evaluation Date: 08/10/2023    Visit #/Visits authorized: 4 / 12   (medicare authorized on 07/18/2023)    Precautions: Standard    Subjective     Chief Complaint:    Chronic low back pain and chronic Neck pain,   Low back pain on right side.  Neck pain from shoulder up to neck and to base of skull on the left.     Medical necessity is demonstrated by the following IMPAIRMENTS: Medical Necessity: Decreased mobility limits day to day activities, social, and emergent situations and Decreased quality of life       Response to Previous Treatment:   Back pain is occasional. Neck is very stiff, worse on the left side. Right shin bruising practically gone, knee is still swollen. Ankles are swollen - Gerda has an appointment with her cardiologist soon and will talk to him about how easily she bruises and the ankle swelling.    Quality of Symptoms (Better/Worse):  some sustained lessening of pain    Other Condition/Symptoms:    - High blood pressure, being monitored, not stable    - Knee surgery in May, right knee, left knee replaced several years ago.  The knee prevents her from walking distances for now.     Objective      New Findings:    lower lateral right shin - skin practically clear; Gerda had soreness along medial border of left scapula    Pulse:    weak, wiry  Tongue:    no coating    Treatment      Treatment Principles:    Move Qi & Blood; stop pain    Needle Set 1 -     Acupuncture Points:    Face down -   Bilateral:  GB20, GB12, BL40, BL57  Right:    Left:  lara medial and superior border of scapula +3  Centerline: GV14    NEEDLES W/O STIM  AT:  1:15  NEEDLES W/O STIM REMOVED AT:    1:45  #NEEDLES IN:   12  #NEEDLES OUT:    12    Other Traditional Chinese Medicine Modalities:   none    Recommendations:    none at this time    Education:  Patient is aware of cumulative effect of acupuncture      Assessment      Analysis of Treatment:    Gerda was relaxed after session.    Pt prognosis is Fair.     Patient will continue to benefit from acupuncture treatment to address the deficits listed in the problem list box on initial evaluation, provide patient family education and to maximize pt's level of independence in the home and community environment.     Patient's spiritual, cultural and educational needs considered and pt agreeable to plan of care and goals.     Anticipated barriers to treatment:   none    Plan     Recommend    continue with care plan.

## 2023-09-25 ENCOUNTER — CLINICAL SUPPORT (OUTPATIENT)
Dept: REHABILITATION | Facility: HOSPITAL | Age: 88
End: 2023-09-25
Payer: MEDICARE

## 2023-09-25 DIAGNOSIS — M54.50 CHRONIC BILATERAL LOW BACK PAIN WITHOUT SCIATICA: Primary | ICD-10-CM

## 2023-09-25 DIAGNOSIS — G89.29 CHRONIC BILATERAL LOW BACK PAIN WITHOUT SCIATICA: Primary | ICD-10-CM

## 2023-09-25 PROCEDURE — 97810 ACUP 1/> WO ESTIM 1ST 15 MIN: CPT

## 2023-09-25 PROCEDURE — 97811 ACUP 1/> W/O ESTIM EA ADD 15: CPT

## 2023-09-25 NOTE — PROGRESS NOTES
Acupuncture Treatment Note     Name: Darling Lai  Clinic Number: [id number]    Traditional Chinese Medicine Diagnosis: Qi Stagnation and Blood Stasis  Physician: Andi Cisneros,*    Date of Service: 9/25/2023     Medical Diagnosis: Chronic bilateral low back pain without sciatica    Evaluation Date: 08/10/2023    Visit #/Visits authorized: 5/ 12   (medicare authorized on 07/18/2023)    Precautions: Standard    Subjective     Chief Complaint:    Chronic low back pain and chronic Neck pain,   Low back pain on right side.  Neck pain from shoulder up to neck and to base of skull on the left.     Medical necessity is demonstrated by the following IMPAIRMENTS: Medical Necessity: Decreased mobility limits day to day activities, social, and emergent situations and Decreased quality of life       Response to Previous Treatment:   No bruising on her foley. Darling's neck is not as stiff but still has pain. Some ache at her right hip. Darling feels like she is walking a bit better. Overall she feels better with the acupuncture.    Quality of Symptoms (Better/Worse):  some sustained lessening of pain    Other Condition/Symptoms:    - High blood pressure, being monitored, not stable    - Knee surgery in May, right knee, left knee replaced several years ago.  The knee prevents her from walking distances for now.     Objective      New Findings:    lateral shin skin is clear, slight mottled area on medial side of lower shin - darling says she has had it since the knee surgery. Her right knee is less swollen    Pulse:    weak, wiry  Tongue:    no coating    Treatment      Treatment Principles:    Move Qi & Blood; stop pain    Needle Set 1 -     Acupuncture Points:    Face up -   Bilateral:  GB12, ear griggs men, ear low back area +2  Right:  encircling knee +4, LIV8, SP9, ST36, LI10  Left:    Centerline:     NEEDLES W/O STIM  AT:  2:00  NEEDLES W/O STIM REMOVED AT:    2:35  #NEEDLES IN:   16  #NEEDLES OUT:   16    Other  Traditional Chinese Medicine Modalities:   manaka burn treatment from medial right shin to right LI10    Recommendations:    none at this time    Education:  Patient is aware of cumulative effect of acupuncture      Assessment      Analysis of Treatment:    Gerda was relaxed after session.    Pt prognosis is Fair.     Patient will continue to benefit from acupuncture treatment to address the deficits listed in the problem list box on initial evaluation, provide patient family education and to maximize pt's level of independence in the home and community environment.     Patient's spiritual, cultural and educational needs considered and pt agreeable to plan of care and goals.     Anticipated barriers to treatment:   none    Plan     Recommend    continue with care plan.

## 2023-10-02 ENCOUNTER — CLINICAL SUPPORT (OUTPATIENT)
Dept: REHABILITATION | Facility: HOSPITAL | Age: 88
End: 2023-10-02
Payer: MEDICARE

## 2023-10-02 DIAGNOSIS — R26.2 DIFFICULTY WALKING: Primary | ICD-10-CM

## 2023-10-02 PROCEDURE — 97112 NEUROMUSCULAR REEDUCATION: CPT | Mod: PO

## 2023-10-02 PROCEDURE — 97110 THERAPEUTIC EXERCISES: CPT | Mod: PO

## 2023-10-02 PROCEDURE — 97530 THERAPEUTIC ACTIVITIES: CPT | Mod: PO

## 2023-10-02 NOTE — PROGRESS NOTES
[facility]   Physical Therapy Treatment / Progress Note      Name: Gerda Lai  Clinic Number: [id number]    Therapy Diagnosis:   Encounter Diagnosis   Name Primary?    Difficulty walking Yes       Physician: Milton Cobian III, *    Visit Date: 10/2/2023    Physician Orders: PT Eval and Treat, R knee Note:    Eval and treat with modalities: s/p R TKA, focus on general gait training/balance/stability, does not need aggressive R TKA rehab (would normally be done with knee PT) 2x/week x4 weeks  Medical Diagnosis from Referral: Status post right knee replacement [Z96.651]  Evaluation Date: 7/12/2023  Authorization Period Expiration: 7/5/2024  Plan of Care Expiration: 10/12/2024  Progress Note Due: 11/2/2023   Visit # / Visits authorized: 16/ 40   FOTO: 2/3     Precautions: Standard  and R TKA      Time In: 03:01 p  Time Out: 03:54 p   Total Billable Time: 53 minutes    PTA Visit #: 0/5       Subjective     Pt reports: she is doing well. She is confident for next time being her last visit.   She was compliant with home exercise program.    SP RIGHT TKA; DOS 5/22/2023     Response to previous treatment: ongoing   Functional change: ongoing    Pain: 0/10  Location: na    Objective      Objective Measures updated at progress report unless specified.   Updated 10/2/2023      KNEE AROM in deg    RIGHT 0 -125  LEFT 0-125    MOBILITY    RIGHT   PF 3/6  sup/inf    FXN TESTS   Tug : 15.70s, SPC   5 x sit to stand: min UE usage 14.77s     R KNEE MMT   Flex 5/5  Ext 5/5     GAIT  Decreased stride and speed with SPC  Without SPC, decreased stride and speed, WBOS, lacking coordination, infrequent need to stop.        Treatment     Gerda received the treatments listed below:      therapeutic exercises to develop strength, endurance, ROM, flexibility, posture, and core stabilization for 20 minutes including:    Recumbent bike full revolutions 10 minutes, 00 resistance  + objective testing        Neuro-muscular Re-education : 25 minutes     staggered stance weight shifting 3 x 10, next to table support   Weight shifting forward/backward, 3 x 10, next to table support  Lateral weight shifting 3 x 10, next to table support  Mechanics and cues        Therapeutic Activities: 08 minutes   Gait training 200 ft x 2, SBA, without SPC     Patient Education and Home Exercises       Education provided:   - HEP verbalized    Written Home Exercises Provided: Patient instructed to cont prior HEP. Exercises were reviewed and Gerda was able to demonstrate them prior to the end of the session.  Gerda demonstrated good  understanding of the education provided. See EMR under Patient Instructions for exercises provided during therapy sessions    Assessment     At progress note, pt demonstrates overall positive carry over with improving functional mobility per objective testing. Pt demonstrates normalized knee strength and equalized knee active range of motion. Recommended continued usage of SPC to improve coordination during ambulation. Pt demonstrates confidence taking over her plan of care. Plan to see patient one more visit to establish indep with poc, including hep review. Plan to DC next visit.     Gerda Is progressing well towards her goals.   Pt prognosis is Excellent.     Pt will continue to benefit from skilled outpatient physical therapy to address the deficits listed in the problem list box on initial evaluation, provide pt/family education and to maximize pt's level of independence in the home and community environment.     Pt's spiritual, cultural and educational needs considered and pt agreeable to plan of care and goals.     Anticipated barriers to physical therapy: none      Goals:  Short Term Goals:   Pt demonstrate indep with initial hep 4 weeks (MET)  Pt demonstrate equalized knee arom to normalize gait  4 weeks (MET)     Long Term Goals:  Pt demonstrate tug test 13 sec or less to decrease fall risk   8 weeks (PARTIALLY MET)  Pt demonstrate 5 x sit to stand 13 secs or less to improve LE strength for community ambulation  8 weeks (PARTIALLY MET)    Plan   Next visit: CRISTINO     Plan of care Certification: 7/12/2023 to 10/12/2023.     Outpatient Physical Therapy 2 times weekly for 6 weeks to include the following interventions: Gait Training, Manual Therapy, Moist Heat/ Ice, Neuromuscular Re-ed, Patient Education, Self Care, Therapeutic Activities, and Therapeutic Exercise.    Carlos Bravo, PT

## 2023-10-05 ENCOUNTER — HOSPITAL ENCOUNTER (OUTPATIENT)
Dept: RADIOLOGY | Facility: HOSPITAL | Age: 88
Discharge: HOME | End: 2023-10-05
Attending: ORTHOPAEDIC SURGERY
Payer: MEDICARE

## 2023-10-05 ENCOUNTER — CLINICAL SUPPORT (OUTPATIENT)
Dept: REHABILITATION | Facility: HOSPITAL | Age: 88
End: 2023-10-05
Payer: MEDICARE

## 2023-10-05 DIAGNOSIS — M25.561 CHRONIC PAIN OF RIGHT KNEE: ICD-10-CM

## 2023-10-05 DIAGNOSIS — M54.50 CHRONIC BILATERAL LOW BACK PAIN WITHOUT SCIATICA: Primary | ICD-10-CM

## 2023-10-05 DIAGNOSIS — M79.642 LEFT HAND PAIN: ICD-10-CM

## 2023-10-05 DIAGNOSIS — G89.29 CHRONIC BILATERAL LOW BACK PAIN WITHOUT SCIATICA: Primary | ICD-10-CM

## 2023-10-05 DIAGNOSIS — G89.29 CHRONIC PAIN OF RIGHT KNEE: ICD-10-CM

## 2023-10-05 PROCEDURE — 97811 ACUP 1/> W/O ESTIM EA ADD 15: CPT

## 2023-10-05 PROCEDURE — 97810 ACUP 1/> WO ESTIM 1ST 15 MIN: CPT

## 2023-10-05 NOTE — PROGRESS NOTES
Acupuncture Treatment Note     Name: Gerad Lai  Clinic Number: [id number]    Traditional Chinese Medicine Diagnosis: Qi Stagnation and Blood Stasis  Physician: Andi Cisneros,*    Date of Service: 10/5/2023     Medical Diagnosis: Chronic bilateral low back pain without sciatica    Evaluation Date: 08/10/2023    Visit #/Visits authorized: 6 / 12   (medicare authorized on 07/18/2023)    Precautions: Standard    Subjective     Chief Complaint:    Chronic low back pain and chronic Neck pain,   Low back pain on right side.  Neck pain from shoulder up to neck and to base of skull on the left.     Medical necessity is demonstrated by the following IMPAIRMENTS: Medical Necessity: Decreased mobility limits day to day activities, social, and emergent situations and Decreased quality of life       Response to Previous Treatment:   Gerda has a new mattress that she hates. Her neck is still bothering her.  Right knee is still swollen, but not as swollen still. Both knees are stiff. Back has occasional pain but much better.    Quality of Symptoms (Better/Worse):  some sustained lessening of pain    Other Condition/Symptoms:    - High blood pressure, being monitored, not stable    - Knee surgery in May, right knee, left knee replaced several years ago.  The knee prevents her from walking distances for now.     Objective      New Findings:     Her right knee is less swollen    Pulse:    weak, wiry  Tongue:    no coating    Treatment      Treatment Principles:    Move Qi & Blood; stop pain    Needle Set 1 -     Acupuncture Points:    Sitting -   Bilateral: SP9, ST36,   Right:  ST34, SP10, lateral neck +1  Left:  Alex Jaime, lara lateral neck +3  Centerline:     NEEDLES W/O STIM  AT:  2:25  NEEDLES W/O STIM REMOVED AT:    2:50  #NEEDLES IN:   11  #NEEDLES OUT:   11    Other Traditional Chinese Medicine Modalities:   none    Recommendations:    none at this time    Education:  Patient is aware of cumulative effect of  acupuncture      Assessment      Analysis of Treatment:    Gerda was relaxed after session.    Pt prognosis is Fair.     Patient will continue to benefit from acupuncture treatment to address the deficits listed in the problem list box on initial evaluation, provide patient family education and to maximize pt's level of independence in the home and community environment.     Patient's spiritual, cultural and educational needs considered and pt agreeable to plan of care and goals.     Anticipated barriers to treatment:   none    Plan     Recommend    continue with care plan.

## 2023-10-09 ENCOUNTER — CLINICAL SUPPORT (OUTPATIENT)
Dept: REHABILITATION | Facility: HOSPITAL | Age: 88
End: 2023-10-09
Payer: MEDICARE

## 2023-10-09 DIAGNOSIS — M54.50 CHRONIC BILATERAL LOW BACK PAIN WITHOUT SCIATICA: Primary | ICD-10-CM

## 2023-10-09 DIAGNOSIS — G89.29 CHRONIC BILATERAL LOW BACK PAIN WITHOUT SCIATICA: Primary | ICD-10-CM

## 2023-10-09 PROCEDURE — 97810 ACUP 1/> WO ESTIM 1ST 15 MIN: CPT

## 2023-10-09 PROCEDURE — 97811 ACUP 1/> W/O ESTIM EA ADD 15: CPT

## 2023-10-09 NOTE — PROGRESS NOTES
Acupuncture Treatment Note     Name: Gerda Lai  Clinic Number: [id number]    Traditional Chinese Medicine Diagnosis: Qi Stagnation and Blood Stasis  Physician: Andi Cisneros,*    Date of Service: 10/9/2023     Medical Diagnosis: Chronic bilateral low back pain without sciatica    Evaluation Date: 08/10/2023    Visit #/Visits authorized: 7 / 12   (medicare authorized on 07/18/2023)    Precautions: Standard    Subjective     Chief Complaint:    Chronic low back pain and chronic Neck pain,   Low back pain on right side.  Neck pain from shoulder up to neck and to base of skull on the left.     Medical necessity is demonstrated by the following IMPAIRMENTS: Medical Necessity: Decreased mobility limits day to day activities, social, and emergent situations and Decreased quality of life       Response to Previous Treatment:   Neck is still bothering her. Back is okay, with occasional twinge at lateral aspect of back. Right knee swelling is less, and knee not painful. Overall Gerda feels her neck has slightly better range of motion, her back pain decreased, and right knee pain lessened.    Quality of Symptoms (Better/Worse):  some sustained lessening of pain    Other Condition/Symptoms:    - High blood pressure, being monitored, not stable    - Knee surgery in May, right knee, left knee replaced several years ago.  The knee prevents her from walking distances for now.     Objective      New Findings:     Her right knee is less swollen    Pulse:    weak, wiry  Tongue:    no coating    Treatment      Treatment Principles:    Move Qi & Blood; stop pain    Needle Set 1 -     Acupuncture Points:    Face up  -   Bilateral: SP9, ST36, He Ding, GB12, GB21  Right:  ST34, SP10, LIV8, lara lateral knee +1  Left:       NEEDLES W/O STIM  AT:  2:20  NEEDLES W/O STIM REMOVED AT:    2:50  #NEEDLES IN:   14  #NEEDLES OUT:   14    Other Traditional Chinese Medicine Modalities:   none    Recommendations:    none at this  time    Education:  Patient is aware of cumulative effect of acupuncture      Assessment      Analysis of Treatment:    Gerda was relaxed after session.    Pt prognosis is Fair.     Patient will continue to benefit from acupuncture treatment to address the deficits listed in the problem list box on initial evaluation, provide patient family education and to maximize pt's level of independence in the home and community environment.     Patient's spiritual, cultural and educational needs considered and pt agreeable to plan of care and goals.     Anticipated barriers to treatment:   none    Plan     Recommend    continue with care plan.

## 2023-10-10 ENCOUNTER — PATIENT MESSAGE (OUTPATIENT)
Dept: ORTHOPEDICS | Facility: CLINIC | Age: 88
End: 2023-10-10
Payer: MEDICARE

## 2023-10-11 DIAGNOSIS — M79.642 PAIN IN LEFT HAND: ICD-10-CM

## 2023-10-11 DIAGNOSIS — M79.641 PAIN IN RIGHT HAND: Primary | ICD-10-CM

## 2023-10-12 ENCOUNTER — CLINICAL SUPPORT (OUTPATIENT)
Dept: REHABILITATION | Facility: HOSPITAL | Age: 88
End: 2023-10-12
Payer: MEDICARE

## 2023-10-12 DIAGNOSIS — R26.2 DIFFICULTY WALKING: Primary | ICD-10-CM

## 2023-10-12 PROCEDURE — 97110 THERAPEUTIC EXERCISES: CPT | Mod: PO

## 2023-10-13 DIAGNOSIS — I10 HYPERTENSION, ESSENTIAL: Chronic | ICD-10-CM

## 2023-10-13 RX ORDER — LORAZEPAM 0.5 MG/1
TABLET ORAL
Qty: 30 TABLET | Refills: 0 | Status: SHIPPED | OUTPATIENT
Start: 2023-10-13 | End: 2023-12-17

## 2023-10-13 NOTE — TELEPHONE ENCOUNTER
No care due was identified.  [facility] Embedded Care Due Messages. Reference number: [id number].   10/13/2023 12:17:56 PM CDT

## 2023-10-25 ENCOUNTER — CLINICAL SUPPORT (OUTPATIENT)
Dept: REHABILITATION | Facility: HOSPITAL | Age: 88
End: 2023-10-25
Payer: MEDICARE

## 2023-10-25 ENCOUNTER — HOSPITAL ENCOUNTER (OUTPATIENT)
Dept: RADIOLOGY | Facility: HOSPITAL | Age: 88
Discharge: HOME | End: 2023-10-25
Attending: ORTHOPAEDIC SURGERY
Payer: MEDICARE

## 2023-10-25 DIAGNOSIS — M79.641 PAIN IN RIGHT HAND: ICD-10-CM

## 2023-10-25 DIAGNOSIS — M79.642 PAIN IN LEFT HAND: ICD-10-CM

## 2023-10-25 DIAGNOSIS — G89.29 CHRONIC BILATERAL LOW BACK PAIN WITHOUT SCIATICA: Primary | ICD-10-CM

## 2023-10-25 DIAGNOSIS — M54.50 CHRONIC BILATERAL LOW BACK PAIN WITHOUT SCIATICA: Primary | ICD-10-CM

## 2023-10-25 PROCEDURE — 97811 ACUP 1/> W/O ESTIM EA ADD 15: CPT | Mod: PN

## 2023-10-25 PROCEDURE — 73130 XR HAND COMPLETE 3 VIEWS BILATERAL: ICD-10-PCS | Mod: 26,50,, | Performed by: RADIOLOGY

## 2023-10-25 PROCEDURE — 73130 X-RAY EXAM OF HAND: CPT | Mod: TC,50,PO

## 2023-10-25 PROCEDURE — 73130 X-RAY EXAM OF HAND: CPT | Mod: 26,50,, | Performed by: RADIOLOGY

## 2023-10-25 PROCEDURE — 97810 ACUP 1/> WO ESTIM 1ST 15 MIN: CPT | Mod: PN

## 2023-10-25 NOTE — PROGRESS NOTES
Acupuncture Treatment Note     Name: Gerda Lai  Clinic Number: [id number]    Traditional Chinese Medicine Diagnosis: Qi Stagnation and Blood Stasis  Physician: Andi Cisneros,*    Date of Service: 10/25/2023     Medical Diagnosis: Chronic bilateral low back pain without sciatica    Evaluation Date: 08/10/2023    Visit #/Visits authorized: 8/ 12   (medicare authorized on 07/18/2023)    Precautions: Standard    Subjective     Chief Complaint:    Chronic low back pain and chronic Neck pain,   Low back pain on right side.  Neck pain from shoulder up to neck and to base of skull on the left.     Medical necessity is demonstrated by the following IMPAIRMENTS: Medical Necessity: Decreased mobility limits day to day activities, social, and emergent situations and Decreased quality of life       Response to Previous Treatment:   neck stiff, worse on right recently.  Pain at lateral side of right sacrum; stiffness at top of right foot yesterday pain at lower shin right foot.  Knees are feeling better. Can not walk long distances. Uses cane when she goes out, sometimes she uses a walker at home for her balance.    Quality of Symptoms (Better/Worse):  some sustained lessening of pain    Other Condition/Symptoms:    - High blood pressure, being monitored, not stable    - Knee surgery in May, right knee, left knee replaced several years ago.      Objective      New Findings:     none    Pulse:    weak, wiry  Tongue:    no coating    Treatment      Treatment Principles:    Move Qi & Blood; stop pain    Needle Set 1 -     Acupuncture Points:    Face down  -   Bilateral: BL10, GB20, GB12,  Right:  GB30  Left:   GV14    NEEDLES W/O STIM  AT:  3:50  NEEDLES W/O STIM REMOVED AT:    4:20  #NEEDLES IN:   8  #NEEDLES OUT:  8    Other Traditional Chinese Medicine Modalities:   light tui na at neck    Recommendations:    none at this time    Education:  Patient is aware of cumulative effect of acupuncture      Assessment       Analysis of Treatment:    Gerda was relaxed after session.    Pt prognosis is Fair.     Patient will continue to benefit from acupuncture treatment to address the deficits listed in the problem list box on initial evaluation, provide patient family education and to maximize pt's level of independence in the home and community environment.     Patient's spiritual, cultural and educational needs considered and pt agreeable to plan of care and goals.     Anticipated barriers to treatment:   none    Plan     Recommend    continue with care plan.

## 2023-11-01 ENCOUNTER — CLINICAL SUPPORT (OUTPATIENT)
Dept: REHABILITATION | Facility: HOSPITAL | Age: 88
End: 2023-11-01
Payer: MEDICARE

## 2023-11-01 DIAGNOSIS — G89.29 CHRONIC BILATERAL LOW BACK PAIN WITHOUT SCIATICA: Primary | ICD-10-CM

## 2023-11-01 DIAGNOSIS — M54.50 CHRONIC BILATERAL LOW BACK PAIN WITHOUT SCIATICA: Primary | ICD-10-CM

## 2023-11-01 PROCEDURE — 97810 ACUP 1/> WO ESTIM 1ST 15 MIN: CPT | Mod: PN

## 2023-11-01 PROCEDURE — 97811 ACUP 1/> W/O ESTIM EA ADD 15: CPT | Mod: PN

## 2023-11-01 NOTE — PROGRESS NOTES
Acupuncture Treatment Note     Name: Gerda Lai  Clinic Number: [id number]    Traditional Chinese Medicine Diagnosis: Qi Stagnation and Blood Stasis  Physician: Andi Cisneros,*    Date of Service: 11/1/2023     Medical Diagnosis: Chronic bilateral low back pain without sciatica    Evaluation Date: 08/10/2023    Visit #/Visits authorized: 9/ 12   (medicare authorized on 07/18/2023)    Precautions: Standard    Subjective     Chief Complaint:    Chronic low back pain and chronic Neck pain,   Low back pain on right side.  Neck pain from shoulder up to neck and to base of skull on the left.     Medical necessity is demonstrated by the following IMPAIRMENTS: Medical Necessity: Decreased mobility limits day to day activities, social, and emergent situations and Decreased quality of life       Response to Previous Treatment:   neck stiff,   Pain at lateral side of right sacrum;  feels like she is walking better.    Quality of Symptoms (Better/Worse):  some sustained lessening of pain    Other Condition/Symptoms:    - High blood pressure, being monitored, not stable    - Knee surgery in May, right knee, left knee replaced several years ago.      Objective      New Findings:     none    Pulse:    weak, wiry  Tongue:    no coating    Treatment      Treatment Principles:    Move Qi & Blood; stop pain    Needle Set 1 -     Acupuncture Points:    Face down  -   Bilateral: BL10, GB20, GB12, lara lateral neck +1  Right:  lara lateral hip +6  Left:   GV14    NEEDLES W/O STIM  AT:  2:45  NEEDLES W/O STIM REMOVED AT:    3:10  #NEEDLES IN:   15  #NEEDLES OUT:  15    Other Traditional Chinese Medicine Modalities:   light tui na at neck    Recommendations:    none at this time    Education:  Patient is aware of cumulative effect of acupuncture      Assessment      Analysis of Treatment:    Gerda was relaxed after session.    Pt prognosis is Fair.     Patient will continue to benefit from acupuncture treatment to address the  deficits listed in the problem list box on initial evaluation, provide patient family education and to maximize pt's level of independence in the home and community environment.     Patient's spiritual, cultural and educational needs considered and pt agreeable to plan of care and goals.     Anticipated barriers to treatment:   none    Plan     Recommend    continue with care plan.

## 2023-11-02 ENCOUNTER — LAB VISIT (OUTPATIENT)
Dept: LAB | Facility: HOSPITAL | Age: 88
End: 2023-11-02
Payer: MEDICARE

## 2023-11-02 ENCOUNTER — OFFICE VISIT (OUTPATIENT)
Dept: INTERNAL MEDICINE | Facility: CLINIC | Age: 88
End: 2023-11-02
Payer: MEDICARE

## 2023-11-02 VITALS
OXYGEN SATURATION: 98 % | DIASTOLIC BLOOD PRESSURE: 78 MMHG | TEMPERATURE: 98 F | RESPIRATION RATE: 18 BRPM | SYSTOLIC BLOOD PRESSURE: 140 MMHG | BODY MASS INDEX: 25.11 KG/M2 | HEART RATE: 67 BPM | WEIGHT: 136.44 LBS | HEIGHT: 62 IN

## 2023-11-02 DIAGNOSIS — I10 HYPERTENSION, ESSENTIAL: Chronic | ICD-10-CM

## 2023-11-02 DIAGNOSIS — I70.0 AORTIC ATHEROSCLEROSIS: Chronic | ICD-10-CM

## 2023-11-02 DIAGNOSIS — E78.5 HYPERLIPIDEMIA, UNSPECIFIED HYPERLIPIDEMIA TYPE: Chronic | ICD-10-CM

## 2023-11-02 DIAGNOSIS — M79.641 BILATERAL HAND PAIN: Primary | ICD-10-CM

## 2023-11-02 DIAGNOSIS — I65.23 BILATERAL CAROTID ARTERY STENOSIS: Chronic | ICD-10-CM

## 2023-11-02 DIAGNOSIS — E78.00 PURE HYPERCHOLESTEROLEMIA: ICD-10-CM

## 2023-11-02 DIAGNOSIS — M53.3 SACRAL PAIN: ICD-10-CM

## 2023-11-02 DIAGNOSIS — I10 HYPERTENSION, ESSENTIAL: Primary | Chronic | ICD-10-CM

## 2023-11-02 DIAGNOSIS — M79.642 BILATERAL HAND PAIN: Primary | ICD-10-CM

## 2023-11-02 LAB
ALBUMIN SERPL BCP-MCNC: 4 G/DL (ref 3.5–5.2)
ALP SERPL-CCNC: 74 U/L (ref 55–135)
ALT SERPL W/O P-5'-P-CCNC: 12 U/L (ref 10–44)
ANION GAP SERPL CALC-SCNC: 10 MMOL/L (ref 8–16)
AST SERPL-CCNC: 22 U/L (ref 10–40)
BILIRUB SERPL-MCNC: 0.9 MG/DL (ref 0.1–1)
BUN SERPL-MCNC: 35 MG/DL (ref 8–23)
CALCIUM SERPL-MCNC: 9.4 MG/DL (ref 8.7–10.5)
CHLORIDE SERPL-SCNC: 107 MMOL/L (ref 95–110)
CHOLEST SERPL-MCNC: 166 MG/DL (ref 120–199)
CHOLEST/HDLC SERPL: 3.1 {RATIO} (ref 2–5)
CO2 SERPL-SCNC: 21 MMOL/L (ref 23–29)
CREAT SERPL-MCNC: 1 MG/DL (ref 0.5–1.4)
EST. GFR  (NO RACE VARIABLE): 53.5 ML/MIN/1.73 M^2
GLUCOSE SERPL-MCNC: 83 MG/DL (ref 70–110)
HDLC SERPL-MCNC: 54 MG/DL (ref 40–75)
HDLC SERPL: 32.5 % (ref 20–50)
LDLC SERPL CALC-MCNC: 83.8 MG/DL (ref 63–159)
NONHDLC SERPL-MCNC: 112 MG/DL
POTASSIUM SERPL-SCNC: 4.4 MMOL/L (ref 3.5–5.1)
PROT SERPL-MCNC: 6.6 G/DL (ref 6–8.4)
SODIUM SERPL-SCNC: 138 MMOL/L (ref 136–145)
TRIGL SERPL-MCNC: 141 MG/DL (ref 30–150)

## 2023-11-02 PROCEDURE — 99214 OFFICE O/P EST MOD 30 MIN: CPT | Mod: PBBFAC,PO,25 | Performed by: INTERNAL MEDICINE

## 2023-11-02 PROCEDURE — G0008 ADMIN INFLUENZA VIRUS VAC: HCPCS | Mod: PBBFAC,PO

## 2023-11-02 PROCEDURE — 80061 LIPID PANEL: CPT | Performed by: INTERNAL MEDICINE

## 2023-11-02 PROCEDURE — 80053 COMPREHEN METABOLIC PANEL: CPT | Performed by: INTERNAL MEDICINE

## 2023-11-02 PROCEDURE — 99999 PR PBB SHADOW E&M-EST. PATIENT-LVL IV: CPT | Mod: PBBFAC,,, | Performed by: INTERNAL MEDICINE

## 2023-11-02 PROCEDURE — 36415 COLL VENOUS BLD VENIPUNCTURE: CPT | Mod: PO | Performed by: INTERNAL MEDICINE

## 2023-11-02 PROCEDURE — 99999PBSHW FLU VACCINE - QUADRIVALENT - ADJUVANTED: Mod: PBBFAC,,,

## 2023-11-02 PROCEDURE — 99214 OFFICE O/P EST MOD 30 MIN: CPT | Mod: S$PBB,,, | Performed by: INTERNAL MEDICINE

## 2023-11-02 PROCEDURE — 99214 PR OFFICE/OUTPT VISIT, EST, LEVL IV, 30-39 MIN: ICD-10-PCS | Mod: S$PBB,,, | Performed by: INTERNAL MEDICINE

## 2023-11-02 PROCEDURE — 99999PBSHW FLU VACCINE - QUADRIVALENT - ADJUVANTED: ICD-10-PCS | Mod: PBBFAC,,,

## 2023-11-02 PROCEDURE — 99999 PR PBB SHADOW E&M-EST. PATIENT-LVL IV: ICD-10-PCS | Mod: PBBFAC,,, | Performed by: INTERNAL MEDICINE

## 2023-11-02 NOTE — PROGRESS NOTES
Subjective:       Patient ID: Gerda Lai is a 89 y.o. female.    Chief Complaint: Follow-up    HPI    89-year-old female here for follow-up.  She reports that she thinks she sits too much. She had her knee operated on in May and her tailbone has been sensitive.  She has put infant butt paste on it, which has helped.  She has been walking more, which has helped.  sHe has been using a donut cushion.  She finished rehab for her knee last week.  She has been doing exercises in her bed before she gets up.      HTN - Patient is currently on labetalol 150 mg twice daily. She does not check her BP at home. Side effects of medications note: none. Denies headaches, blurred vision, chest pain, shortness of breath, nausea.    HLD - Patient is currently on pravastatin 80 mg.  Her last lipid panel was   Cholesterol   Date Value Ref Range Status   03/22/2023 171 120 - 199 mg/dL Final     Comment:     The National Cholesterol Education Program (NCEP) has set the  following guidelines (reference ranges) for Cholesterol:  Optimal.....................<200 mg/dL  Borderline High.............200-239 mg/dL  High........................> or = 240 mg/dL       Triglycerides   Date Value Ref Range Status   03/22/2023 163 (H) 30 - 150 mg/dL Final     Comment:     The National Cholesterol Education Program (NCEP) has set the  following guidelines (reference values) for triglycerides:  Normal......................<150 mg/dL  Borderline High.............150-199 mg/dL  High........................200-499 mg/dL       HDL   Date Value Ref Range Status   03/22/2023 54 40 - 75 mg/dL Final     Comment:     The National Cholesterol Education Program (NCEP) has set the  following guidelines (reference values) for HDL Cholesterol:  Low...............<40 mg/dL  Optimal...........>60 mg/dL       LDL Cholesterol   Date Value Ref Range Status   03/22/2023 84.4 63.0 - 159.0 mg/dL Final     Comment:     The National Cholesterol Education Program (NCEP)  has set the  following guidelines (reference values) for LDL Cholesterol:  Optimal.......................<130 mg/dL  Borderline High...............130-159 mg/dL  High..........................160-189 mg/dL  Very High.....................>190 mg/dL     .  Side effects of the medication: none.    Review of Systems      Objective:      Physical Exam  Vitals reviewed.   Constitutional:       Appearance: She is well-developed.   HENT:      Head: Normocephalic and atraumatic.      Mouth/Throat:      Pharynx: No oropharyngeal exudate.   Eyes:      General: No scleral icterus.        Right eye: No discharge.         Left eye: No discharge.      Pupils: Pupils are equal, round, and reactive to light.   Neck:      Thyroid: No thyromegaly.      Trachea: No tracheal deviation.   Cardiovascular:      Rate and Rhythm: Normal rate and regular rhythm.      Heart sounds: Normal heart sounds. No murmur heard.     No friction rub. No gallop.   Pulmonary:      Effort: Pulmonary effort is normal. No respiratory distress.      Breath sounds: Normal breath sounds. No wheezing or rales.   Chest:      Chest wall: No tenderness.   Abdominal:      General: Bowel sounds are normal. There is no distension.      Palpations: Abdomen is soft. There is no mass.      Tenderness: There is no abdominal tenderness. There is no guarding or rebound.   Musculoskeletal:         General: No tenderness. Normal range of motion.      Cervical back: Normal range of motion and neck supple.   Skin:     General: Skin is warm and dry.      Coloration: Skin is not pale.      Findings: No erythema or rash.   Neurological:      Mental Status: She is alert and oriented to person, place, and time.   Psychiatric:         Behavior: Behavior normal.         Assessment:       1. Hypertension, essential    2. Hyperlipidemia, unspecified hyperlipidemia type    3. Aortic atherosclerosis    4. Bilateral carotid artery stenosis    5. Sacral pain      Plan:       1. Labetalol 150  mg b.i.d..  2/3/4.  Continue pravastatin 80 mg p.o.   5. Patient advised to take pressure off.

## 2023-11-05 ENCOUNTER — PATIENT MESSAGE (OUTPATIENT)
Dept: ORTHOPEDICS | Facility: CLINIC | Age: 88
End: 2023-11-05
Payer: MEDICARE

## 2023-11-06 ENCOUNTER — CLINICAL SUPPORT (OUTPATIENT)
Dept: REHABILITATION | Facility: HOSPITAL | Age: 88
End: 2023-11-06
Payer: MEDICARE

## 2023-11-06 DIAGNOSIS — G89.29 CHRONIC BILATERAL LOW BACK PAIN WITHOUT SCIATICA: Primary | ICD-10-CM

## 2023-11-06 DIAGNOSIS — M54.50 CHRONIC BILATERAL LOW BACK PAIN WITHOUT SCIATICA: Primary | ICD-10-CM

## 2023-11-06 PROCEDURE — 97810 ACUP 1/> WO ESTIM 1ST 15 MIN: CPT | Mod: PN

## 2023-11-06 PROCEDURE — 97811 ACUP 1/> W/O ESTIM EA ADD 15: CPT | Mod: PN

## 2023-11-06 NOTE — PROGRESS NOTES
Acupuncture Treatment Note     Name: Gerda Lai  Clinic Number: [id number]    Traditional Chinese Medicine Diagnosis: Qi Stagnation and Blood Stasis  Physician: Andi Cisneros,*    Date of Service: 11/6/2023     Medical Diagnosis: Chronic bilateral low back pain without sciatica    Evaluation Date: 08/10/2023    Visit #/Visits authorized: 10/ 12   (medicare authorized on 07/18/2023)    Precautions: Standard    Subjective     Chief Complaint:    Chronic low back pain and chronic Neck pain,   Low back pain on right side.  Neck pain from shoulder up to neck and to base of skull on the left.     Medical necessity is demonstrated by the following IMPAIRMENTS: Medical Necessity: Decreased mobility limits day to day activities, social, and emergent situations and Decreased quality of life       Response to Previous Treatment:   Neck stiffness no change; hip pain just when she wakes    Quality of Symptoms (Better/Worse):  some sustained lessening of pain    Other Condition/Symptoms:    - High blood pressure, being monitored, not stable    - Knee surgery in May, right knee, left knee replaced several years ago.      Objective      New Findings:     none    Pulse:    weak, wiry  Tongue:    no coating    Treatment      Treatment Principles:    Move Qi & Blood; stop pain    Needle Set 1 -     Acupuncture Points:    Sitting -   Bilateral:   Right:  lateral neck lara +3  Left:  lateral neck and shoulder +5  Centerline GV20      NEEDLES W/O STIM  AT:4:20  NEEDLES W/O STIM REMOVED AT:   4:45  #NEEDLES IN:   9  #NEEDLES OUT:  9    Other Traditional Chinese Medicine Modalities:   light tui na at neck    Recommendations:    none at this time    Education:  Patient is aware of cumulative effect of acupuncture      Assessment      Analysis of Treatment:    Gerda had slightly better range of motion to the right, neck still stiff    Pt prognosis is Fair.     Patient will continue to benefit from acupuncture treatment to  address the deficits listed in the problem list box on initial evaluation, provide patient family education and to maximize pt's level of independence in the home and community environment.     Patient's spiritual, cultural and educational needs considered and pt agreeable to plan of care and goals.     Anticipated barriers to treatment:   none    Plan     Recommend    continue with care plan.

## 2023-11-07 ENCOUNTER — TELEPHONE (OUTPATIENT)
Dept: ORTHOPEDICS | Facility: CLINIC | Age: 88
End: 2023-11-07
Payer: MEDICARE

## 2023-11-09 ENCOUNTER — OFFICE VISIT (OUTPATIENT)
Dept: CARDIOLOGY | Facility: CLINIC | Age: 88
End: 2023-11-09
Payer: MEDICARE

## 2023-11-09 VITALS
HEART RATE: 62 BPM | BODY MASS INDEX: 25.15 KG/M2 | WEIGHT: 136.69 LBS | HEIGHT: 62 IN | DIASTOLIC BLOOD PRESSURE: 74 MMHG | SYSTOLIC BLOOD PRESSURE: 136 MMHG

## 2023-11-09 DIAGNOSIS — I10 HYPERTENSION, ESSENTIAL: Chronic | ICD-10-CM

## 2023-11-09 DIAGNOSIS — N18.31 STAGE 3A CHRONIC KIDNEY DISEASE: ICD-10-CM

## 2023-11-09 DIAGNOSIS — I70.0 AORTIC ATHEROSCLEROSIS: Chronic | ICD-10-CM

## 2023-11-09 DIAGNOSIS — E78.00 PURE HYPERCHOLESTEROLEMIA: Chronic | ICD-10-CM

## 2023-11-09 DIAGNOSIS — I65.23 BILATERAL CAROTID ARTERY STENOSIS: Primary | Chronic | ICD-10-CM

## 2023-11-09 PROCEDURE — 99213 OFFICE O/P EST LOW 20 MIN: CPT | Mod: PBBFAC,PO | Performed by: PHYSICIAN ASSISTANT

## 2023-11-09 PROCEDURE — 99999 PR PBB SHADOW E&M-EST. PATIENT-LVL III: ICD-10-PCS | Mod: PBBFAC,,, | Performed by: PHYSICIAN ASSISTANT

## 2023-11-09 PROCEDURE — 99214 OFFICE O/P EST MOD 30 MIN: CPT | Mod: S$PBB,,, | Performed by: PHYSICIAN ASSISTANT

## 2023-11-09 PROCEDURE — 99999 PR PBB SHADOW E&M-EST. PATIENT-LVL III: CPT | Mod: PBBFAC,,, | Performed by: PHYSICIAN ASSISTANT

## 2023-11-09 PROCEDURE — 99214 PR OFFICE/OUTPT VISIT, EST, LEVL IV, 30-39 MIN: ICD-10-PCS | Mod: S$PBB,,, | Performed by: PHYSICIAN ASSISTANT

## 2023-11-09 RX ORDER — EZETIMIBE 10 MG/1
10 TABLET ORAL DAILY
Qty: 90 TABLET | Refills: 3 | Status: SHIPPED | OUTPATIENT
Start: 2023-11-09 | End: 2024-11-08

## 2023-11-09 NOTE — PROGRESS NOTES
Cardiology Clinic Note  Reason for Visit: F/u HLD, carotid stenosis and HTN  LOV w/ Dr. Camarena: 3/28/2023    HPI:     Problem List:  Hypertension  - intolerance to multiple antihypertensive meds  - wide auscultatory gap  Right subclavian artery stenosis  Bilateral Carotid artery disease, 70-79%  - should not hold aspirin   Breast cancer      Gerda Lai is a 89 y.o. F, who presents for follow up regarding HTN, HLD and carotid stenosis. At her last visit she was recommended to begin spironolactone. She reports an episode of dehydration requiring IV rehydration at the ER. She discontinued spironolactone following this incident. Her BP is normal in clinic. Her digital HTN number at home remain elevated. At last visit she was recommended to begin zetia, however the medication was never received at the pharmacy. She is being monitored for carotid stenosis, US 4/2023 did not show significant change from previous year.       ROS:    Pertinent ROS included in HPI and below.  PMH:     Past Medical History:   Diagnosis Date    Anxiety     Arthritis     Basal cell carcinoma 09/2016    right post auricular neck     Basal cell carcinoma of right forearm 04/19/2023    R lower forearm    Breast cancer 1992    right    Cataract     Fibromyalgia     History of measles as a child     Hyperlipidemia     Hypertension     Personal history of colonic polyps     Pneumonia     SCC (squamous cell carcinoma) 2015    R chest    SCC (squamous cell carcinoma) 2016    left medial shoulder    SCC (squamous cell carcinoma) 2017    left knee    SCC (squamous cell carcinoma) 2022    L upper chest, R upper arm KA types    Shingles     Squamous cell carcinoma 2015    right forearm    Thyroid disease     Vaginitis      Past Surgical History:   Procedure Laterality Date    ADENOIDECTOMY      ARTHROPLASTY, KNEE, TOTAL, USING COMPUTER-ASSISTED NAVIGATION Right 5/22/2023    Procedure: ARTHROPLASTY, KNEE, TOTAL, USING COMPUTER-ASSISTED NAVIGATION:  QUYEN: RIGHT: MARTHA - TRIATHALON;  Surgeon: Milton Cobian III, MD;  Location: [facility] OR;  Service: Orthopedics;  Laterality: Right;    BREAST BIOPSY Left     Excisional bx, benign    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    CARPAL TUNNEL RELEASE Right 3/16/2021    Procedure: RELEASE, CARPAL TUNNEL;  Surgeon: Barbara Aldridge MD;  Location: [facility] OR;  Service: Orthopedics;  Laterality: Right;    CATARACT EXTRACTION W/  INTRAOCULAR LENS IMPLANT Bilateral     EPIDURAL STEROID INJECTION N/A 2/24/2023    Procedure: HEIDI C7-T1;  Surgeon: Andi Cisneros DO;  Location: [facility] OR;  Service: Pain Management;  Laterality: N/A;    EYE SURGERY      HAND SURGERY      INJECTION OF ANESTHETIC AGENT AROUND MEDIAL BRANCH NERVES INNERVATING CERVICAL FACET JOINT N/A 1/4/2022    Procedure: Cervical Medial Branch Block C5-6, C6-7 (No Sedation);  Surgeon: Himanshu Blackmon Jr., MD;  Location: [facility] PAIN MGT;  Service: Pain Management;  Laterality: N/A;    INJECTION OF ANESTHETIC AGENT AROUND MEDIAL BRANCH NERVES INNERVATING LUMBAR FACET JOINT Left 11/18/2021    Procedure: Cervical Medial Branch Block Left C5-6, C6-7 (IV Sedation);  Surgeon: Himanshu Blackmon Jr., MD;  Location: [facility] PAIN [facility]GT;  Service: Pain Management;  Laterality: Left;    JOINT REPLACEMENT Right     ELZBIETA    KNEE ARTHROPLASTY Left 8/10/2020    Procedure: ARTHROPLASTY, KNEE-ADE NEXGEN/CEMENTED TIBIA;  Surgeon: Kalin Pacheco MD;  Location: [facility] OR;  Service: Orthopedics;  Laterality: Left;    RADIOFREQUENCY THERMAL COAGULATION OF MEDIAL BRANCH OF POSTERIOR RAMUS OF CERVICAL SPINAL NERVE Left 3/8/2022    Procedure: RADIOFREQUENCY THERMAL COAGULATION, NERVE, SPINAL, CERVICAL, LEFT C5-6 AND C6-7;  Surgeon: Himanshu Blackmon Jr., MD;  Location: [facility]IN [facility]GT;  Service: Pain Management;  Laterality: Left;  NO PACEMAKER   ASA    thyriodectomy      partial    tonsillectomy      TONSILLECTOMY      TOTAL HIP ARTHROPLASTY      right    Yag  Left      Allergies:     Review  of patient's allergies indicates:   Allergen Reactions    Sulfa (sulfonamide antibiotics) Rash    Clarithromycin Other (See Comments)     Weak, extreme fatigue. dizziness    Flexeril [cyclobenzaprine] Other (See Comments)     Dizziness    Iodine and iodide containing products     Lisinopril Other (See Comments)     Cough and sensation of throat swelling/?angioedema    Losartan Rash    Metoprolol Swelling     Tightness in throat    Spironolactone      Throat tightening    Tramadol Other (See Comments)     Dizzy and weak    Verapamil (bulk) Palpitations    Voltaren [diclofenac sodium] Other (See Comments)     Drops blood pressure     Medications:     Current Outpatient Medications on File Prior to Visit   Medication Sig Dispense Refill    acetaminophen (TYLENOL) 650 MG TbSR Take 1 tablet (650 mg total) by mouth every 8 (eight) hours. 120 tablet 0    aliskiren (TEKTURNA) 300 MG Tab TAKE 1 TABLET BY MOUTH EVERY DAY 90 tablet 2    aspirin (ECOTRIN) 81 MG EC tablet TAKE 1 TABLET BY MOUTH EVERY DAY 90 tablet 3    coenzyme Q10 100 mg capsule Take 100 mg by mouth once daily.      glucosamine-chondroitin 500-400 mg tablet Take 1 tablet by mouth once daily.       labetaloL (NORMODYNE) 100 MG tablet TAKE 1.5 TABLETS (150 MG TOTAL) BY MOUTH EVERY 12 (TWELVE) HOURS. 270 tablet 3    LORazepam (ATIVAN) 0.5 MG tablet TAKE 1/2 TABLET TO 1 TABLET BY MOUTH EVERY 12 HOURS AS NEEDED FOR ANXIETY 30 tablet 0    multivitamin capsule Take 1 capsule by mouth once daily.      omeprazole (PRILOSEC OTC) 20 MG tablet Take 20 mg by mouth once daily.      pravastatin (PRAVACHOL) 80 MG tablet Take 1 tablet (80 mg total) by mouth once daily. 90 tablet 3    psyllium husk, aspartame, (METAMUCIL) 3.4 gram PwPk packet Take 1 packet by mouth once daily. 30 packet 1    TURMERIC ORAL Take 500 mg by mouth once daily.       vitamin D (VITAMIN D3) 1000 units Tab Take 1,000 Units by mouth once daily.      lactase (LACTAID) 3,000 unit tablet Take 1 tablet by  "mouth 3 (three) times daily as needed.       No current facility-administered medications on file prior to visit.     Social History:     Social History     Tobacco Use    Smoking status: Never     Passive exposure: Never    Smokeless tobacco: Never   Substance Use Topics    Alcohol use: Yes     Comment: socially - celebrations only     Family History:     Family History   Problem Relation Age of Onset    Breast cancer Mother     Cancer Mother     Stroke Paternal Grandfather     Heart disease Father     Cancer Paternal Aunt     Heart disease Paternal Aunt     Glaucoma Paternal Grandmother     Amblyopia Neg Hx     Blindness Neg Hx     Cataracts Neg Hx     Diabetes Neg Hx     Hypertension Neg Hx     Macular degeneration Neg Hx     Retinal detachment Neg Hx     Strabismus Neg Hx     Thyroid disease Neg Hx     Melanoma Neg Hx      Physical Exam:   /74   Pulse 62   Ht 5' 2" (1.575 m)   Wt 62 kg (136 lb 11 oz)   BMI 25.00 kg/m²      Physical Exam  Vitals and nursing note reviewed.   Constitutional:       Appearance: Normal appearance.   HENT:      Head: Normocephalic and atraumatic.   Neck:      Vascular: Normal carotid pulses. No carotid bruit or hepatojugular reflux.   Cardiovascular:      Rate and Rhythm: Normal rate and regular rhythm.      Chest Wall: PMI is not displaced.      Pulses:           Carotid pulses are  on the right side with bruit and  on the left side with bruit.       Radial pulses are 2+ on the right side and 2+ on the left side.        Dorsalis pedis pulses are 2+ on the right side and 2+ on the left side.        Posterior tibial pulses are 2+ on the right side and 2+ on the left side.      Heart sounds: No murmur heard.  Pulmonary:      Effort: Pulmonary effort is normal.      Breath sounds: Normal breath sounds. No wheezing, rhonchi or rales.   Abdominal:      General: Bowel sounds are normal. There is no abdominal bruit.      Palpations: Abdomen is soft. There is no pulsatile mass.      " Tenderness: There is no abdominal tenderness.   Feet:      Right foot:      Skin integrity: Skin integrity normal.      Left foot:      Skin integrity: Skin integrity normal.   Skin:     Capillary Refill: Capillary refill takes less than 2 seconds.   Neurological:      General: No focal deficit present.      Mental Status: She is alert.   Psychiatric:         Mood and Affect: Mood and affect normal.         Speech: Speech normal.         Behavior: Behavior is cooperative.         Thought Content: Thought content normal.          Labs:     Blood Tests:  Lab Results   Component Value Date    BNP 42 08/19/2023     11/02/2023    K 4.4 11/02/2023     11/02/2023    CO2 21 (L) 11/02/2023    BUN 35 (H) 11/02/2023    CREATININE 1.0 11/02/2023    GLU 83 11/02/2023    HGBA1C 5.5 05/27/2011    MG 1.8 06/05/2023    AST 22 11/02/2023    ALT 12 11/02/2023    ALBUMIN 4.0 11/02/2023    PROT 6.6 11/02/2023    BILITOT 0.9 11/02/2023    WBC 7.13 08/19/2023    HGB 12.4 08/19/2023    HCT 36.5 (L) 08/19/2023    HCT 33 (L) 05/23/2023    MCV 97 08/19/2023    MCV 98.2 (H) 10/13/2004     08/19/2023    INR 1.0 04/25/2023    TSH 1.821 01/12/2022       Lab Results   Component Value Date    CHOL 166 11/02/2023    HDL 54 11/02/2023    TRIG 141 11/02/2023       Lab Results   Component Value Date    LDLCALC 83.8 11/02/2023         Imaging:     Echocardiogram  None    Stress testing  None    Cath Lab  None    Other  Carotid US 4/10/2023  There is greater than 70% right Internal Carotid Stenosis.  There is greater than 70% left Internal Carotid Stenosis.  There is antegrade flow in the vertebral arteries bilaterally.  There is greater than 50% external carotid artery stenosis bilaterally.    Carotid US 6/14/2023  There is 70-79% right Internal Carotid Stenosis.  There is 70-79% left Internal Carotid Stenosis.  >50% bilat ECA stenosis.  Compared with prior report dated 6/21/21, R ICA stenosis has progressed.    Carotid US  2021  There is 60-69% right internal carotid artery stenosis.  There is 70-79% left internal carotid artery stenosis.    EK2023  Sinus bradycardia with 1st degree A-V block   Septal infarct (cited on or before 19-AUG-2023)   Abnormal ECG   When compared with ECG of 28-MAR-2023 13:26,   No significant change was found     Assessment:     1. Bilateral carotid artery stenosis    2. Hypertension, essential    3. Pure hypercholesterolemia    4. Aortic atherosclerosis    5. Stage 3a chronic kidney disease        Plan:     Bilateral carotid artery stenosis  Continue aspirin and statin   Aspirin should be held only if absolutely necessary for any procedure   Repeat carotid US 2024    Hypertension, essential  Difficult to manage with multiple medication intolerances.   Recommended trial of aliskiren in AM vs. PM   Recommended monitoring BP in the afternoon, not before AM meds     Pure hypercholesterolemia  Continue pravastatin 80 mg qD  Begin zetia 10 mg qD  Lipid panel in 3 months     Aortic atherosclerosis  Continue aspirin and statin     Stage 3a chronic kidney disease  Stable, monitored by PCP       Signed:  Holli Lara PA-C  Cardiology     11/15/2023 12:41 PM    Follow-up:     Future Appointments   Date Time Provider Department Center   2023  1:00 PM Ema Orta [facility] [place]   2023  1:30 PM Barbara Aldridge MD [facility] [place]   2023  3:00 PM Ema Orta [facility] [place]   2024  1:30 PM Andi Cisneros DO [facility] [place]   2024  1:40 PM Tomas Beltran MD [facility] [place]

## 2023-11-09 NOTE — PATIENT INSTRUCTIONS
Continue taking labetalol twice daily, and let's try switching the Aliskiren to the morning as well.     For the next week please check your blood pressure between 1-4 pm daily.

## 2023-11-20 ENCOUNTER — CLINICAL SUPPORT (OUTPATIENT)
Dept: REHABILITATION | Facility: HOSPITAL | Age: 88
End: 2023-11-20
Payer: MEDICARE

## 2023-11-20 DIAGNOSIS — M54.50 CHRONIC BILATERAL LOW BACK PAIN WITHOUT SCIATICA: Primary | ICD-10-CM

## 2023-11-20 DIAGNOSIS — G89.29 CHRONIC BILATERAL LOW BACK PAIN WITHOUT SCIATICA: Primary | ICD-10-CM

## 2023-11-20 PROCEDURE — 97810 ACUP 1/> WO ESTIM 1ST 15 MIN: CPT | Mod: PN

## 2023-11-20 PROCEDURE — 97811 ACUP 1/> W/O ESTIM EA ADD 15: CPT | Mod: PN

## 2023-11-20 NOTE — PROGRESS NOTES
Acupuncture Treatment Note     Name: Gerda Lai  Clinic Number: [id number]    Traditional Chinese Medicine Diagnosis: Qi Stagnation and Blood Stasis  Physician: Andi Cisneros,*    Date of Service: 11/20/2023     Medical Diagnosis: Chronic bilateral low back pain without sciatica    Evaluation Date: 08/10/2023    Visit #/Visits authorized: 11/ 12   (medicare authorized on 07/18/2023)    Precautions: Standard    Subjective     Chief Complaint:    Chronic low back pain and chronic Neck pain,   Low back pain on right side.  Neck pain from shoulder up to neck and to base of skull on the left.     Medical necessity is demonstrated by the following IMPAIRMENTS: Medical Necessity: Decreased mobility limits day to day activities, social, and emergent situations and Decreased quality of life       Response to Previous Treatment:  Back has been okay. Gerda has been using a wet pack at her neck and she has perhaps slightly less pain on turning her head but still very limited range of motion turning to the left. Her knees are fair, some stiffness.   Her hands have been painful, she is seeing doctor next week.    Quality of Symptoms (Better/Worse):  some sustained lessening of pain    Other Condition/Symptoms:    - High blood pressure, being monitored, not stable    - Knee surgery in May, right knee, left knee replaced several years ago.      Objective      New Findings:     none    Pulse:    weak, wiry  Tongue:    no coating    Treatment      Treatment Principles:    Move Qi & Blood; stop pain    Needle Set 1 -     Acupuncture Points:    Face up -    Bilateral:  Navid Vieyra  Right:   Left:    Centerline       NEEDLES W/O STIM  AT:  1:25  NEEDLES W/O STIM REMOVED AT:   1:50  #NEEDLES IN:   8  #NEEDLES OUT:  8    Other Traditional Chinese Medicine Modalities:  Qi Gong neck/thoracic area    Recommendations:    none at this time    Education:  Patient is aware of cumulative effect of acupuncture      Assessment      Analysis  of Treatment:    Gerda's hands were slightly less painful. Her neck possibly increased range of motion, will see how she feels over the next few days.    Pt prognosis is Fair.     Patient will continue to benefit from acupuncture treatment to address the deficits listed in the problem list box on initial evaluation, provide patient family education and to maximize pt's level of independence in the home and community environment.     Patient's spiritual, cultural and educational needs considered and pt agreeable to plan of care and goals.     Anticipated barriers to treatment:   none    Plan     Recommend    continue with care plan.

## 2023-11-27 ENCOUNTER — OFFICE VISIT (OUTPATIENT)
Dept: ORTHOPEDICS | Facility: CLINIC | Age: 88
End: 2023-11-27
Payer: MEDICARE

## 2023-11-27 ENCOUNTER — CLINICAL SUPPORT (OUTPATIENT)
Dept: REHABILITATION | Facility: HOSPITAL | Age: 88
End: 2023-11-27
Payer: MEDICARE

## 2023-11-27 DIAGNOSIS — M79.641 CHRONIC PAIN OF RIGHT HAND: Primary | ICD-10-CM

## 2023-11-27 DIAGNOSIS — G89.29 CHRONIC PAIN OF RIGHT HAND: Primary | ICD-10-CM

## 2023-11-27 DIAGNOSIS — M54.50 CHRONIC BILATERAL LOW BACK PAIN WITHOUT SCIATICA: Primary | ICD-10-CM

## 2023-11-27 DIAGNOSIS — M19.042 PRIMARY OSTEOARTHRITIS OF BOTH HANDS: ICD-10-CM

## 2023-11-27 DIAGNOSIS — M19.041 PRIMARY OSTEOARTHRITIS OF BOTH HANDS: ICD-10-CM

## 2023-11-27 DIAGNOSIS — G89.29 CHRONIC BILATERAL LOW BACK PAIN WITHOUT SCIATICA: Primary | ICD-10-CM

## 2023-11-27 PROCEDURE — 99213 OFFICE O/P EST LOW 20 MIN: CPT | Mod: PBBFAC,PO,25 | Performed by: ORTHOPAEDIC SURGERY

## 2023-11-27 PROCEDURE — 99999 PR PBB SHADOW E&M-EST. PATIENT-LVL III: CPT | Mod: PBBFAC,,, | Performed by: ORTHOPAEDIC SURGERY

## 2023-11-27 PROCEDURE — 97810 ACUP 1/> WO ESTIM 1ST 15 MIN: CPT | Mod: PN

## 2023-11-27 PROCEDURE — 97811 ACUP 1/> W/O ESTIM EA ADD 15: CPT | Mod: PN

## 2023-11-27 PROCEDURE — 99214 PR OFFICE/OUTPT VISIT, EST, LEVL IV, 30-39 MIN: ICD-10-PCS | Mod: S$PBB,,, | Performed by: ORTHOPAEDIC SURGERY

## 2023-11-27 PROCEDURE — 99214 OFFICE O/P EST MOD 30 MIN: CPT | Mod: S$PBB,,, | Performed by: ORTHOPAEDIC SURGERY

## 2023-11-27 PROCEDURE — 99999 PR PBB SHADOW E&M-EST. PATIENT-LVL III: ICD-10-PCS | Mod: PBBFAC,,, | Performed by: ORTHOPAEDIC SURGERY

## 2023-11-27 NOTE — PROGRESS NOTES
Acupuncture Treatment Note     Name: Gerda Lai  Clinic Number: [id number]    Traditional Chinese Medicine Diagnosis: Qi Stagnation and Blood Stasis  Physician: Andi Cisneros,*    Date of Service: 11/27/2023     Medical Diagnosis: Chronic bilateral low back pain without sciatica    Evaluation Date: 08/10/2023    Visit #/Visits authorized: 12/ 12   (medicare authorized on 07/18/2023)    Precautions: Standard    Subjective     Chief Complaint:    Chronic low back pain and chronic Neck pain,   Low back pain on right side.  Neck pain from shoulder up to neck and to base of skull on the left.     Medical necessity is demonstrated by the following IMPAIRMENTS: Medical Necessity: Decreased mobility limits day to day activities, social, and emergent situations and Decreased quality of life       Response to Previous Treatment:  Doctor recommended either a shot for pain in hand or a CBD topical ointment. Gerda will try the ointment. No significant change in her neck.    Quality of Symptoms (Better/Worse):  no change in neck pain    Other Condition/Symptoms:    - High blood pressure, being monitored, not stable    - Knee surgery in May, right knee, left knee replaced several years ago.      Objective      New Findings:     none    Pulse:    weak, wiry  Tongue:    no coating    Treatment      Treatment Principles:    Move Qi & Blood; stop pain    Needle Set 1 -     Acupuncture Points:    Face down -    Bilateral:  BL10, GB20, GB12, lara lateral neck +2, BL11, BL12, BL13  Right:   Left:  SI9, SI11  Centerline       NEEDLES W/O STIM  AT:  3:15  NEEDLES W/O STIM REMOVED AT:   3:40  #NEEDLES IN:   18  #NEEDLES OUT:  18    Other Traditional Chinese Medicine Modalities: heat lamp    Recommendations:    none at this time    Education:  Patient is aware of cumulative effect of acupuncture      Assessment      Analysis of Treatment:    Gerda was relaxed, no significant change in neck stiffness.    Pt prognosis is Fair.      Patient will continue to benefit from acupuncture treatment to address the deficits listed in the problem list box on initial evaluation, provide patient family education and to maximize pt's level of independence in the home and community environment.     Patient's spiritual, cultural and educational needs considered and pt agreeable to plan of care and goals.     Anticipated barriers to treatment:   none    Plan     Recommend    continue with care plan.

## 2023-11-27 NOTE — PROGRESS NOTES
Gerda Lai presents for follow up evaluation of No diagnosis found.  Patient is s/p Right carpal tunnel release on 3.16.21 and doing well.    Patient presents today to discuss new problem of pain in bilateral hands for the past several months, right greater than left.  She denies trauma.  She states the pain is in the knuckles of all of the fingers and into the palm.  She denies numbness and tingling.  She has tried Voltaren gel in the past but has developed an allergy to Voltaren gel.    PE:    AA&O x 4.  NAD  HEENT:  NCAT, sclera nonicteric  Lungs:  Respirations are equal and unlabored.  CV:  2+ bilateral upper and lower extremity pulses.  MSK:  Bilateral Frances's and Heberden's nodes.  No catching and locking of fingers.  Negative compression, Tinel's and Phalen's.  Neurovascularly intact bilaterally.  5/5 thenar and intrinsic musculature strength.  Some decreased motion in IP joints due to arthritic changes, able to flex into the palm.    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     A/P:  Bilateral hand arthritis and pain.  1) We have discussed the natural history of hand arthritis including treatment options such as splinting, oral and topical anti-inflammatories, cortisone injections and surgery. She would like to proceed with topical creams at this time.    2) F/U prn  3) Call with any questions/concerns in the interim        Barbara Aldridge MD    Please be aware that this note has been generated with the assistance of [facility] voice-to-text.  Please excuse any spelling or grammatical errors.

## 2023-12-11 ENCOUNTER — PATIENT MESSAGE (OUTPATIENT)
Dept: ORTHOPEDICS | Facility: CLINIC | Age: 88
End: 2023-12-11
Payer: MEDICARE

## 2023-12-13 ENCOUNTER — PATIENT MESSAGE (OUTPATIENT)
Dept: ORTHOPEDICS | Facility: CLINIC | Age: 88
End: 2023-12-13
Payer: MEDICARE

## 2023-12-16 DIAGNOSIS — I10 HYPERTENSION, ESSENTIAL: Chronic | ICD-10-CM

## 2023-12-16 NOTE — TELEPHONE ENCOUNTER
No care due was identified.  [facility] Embedded Care Due Messages. Reference number: [id number].   12/16/2023 11:43:33 AM CST

## 2023-12-17 RX ORDER — LORAZEPAM 0.5 MG/1
TABLET ORAL
Qty: 30 TABLET | Refills: 0 | Status: SHIPPED | OUTPATIENT
Start: 2023-12-17 | End: 2024-02-13

## 2024-01-01 ENCOUNTER — PATIENT MESSAGE (OUTPATIENT)
Dept: RHEUMATOLOGY | Facility: HOSPITAL | Age: 89
End: 2024-01-01
Payer: MEDICARE

## 2024-01-18 ENCOUNTER — OFFICE VISIT (OUTPATIENT)
Dept: PAIN MEDICINE | Facility: CLINIC | Age: 89
End: 2024-01-18
Payer: MEDICARE

## 2024-01-18 VITALS
DIASTOLIC BLOOD PRESSURE: 69 MMHG | BODY MASS INDEX: 25.15 KG/M2 | HEART RATE: 61 BPM | WEIGHT: 136.69 LBS | HEIGHT: 62 IN | SYSTOLIC BLOOD PRESSURE: 126 MMHG

## 2024-01-18 DIAGNOSIS — M47.812 CERVICAL SPONDYLOSIS: ICD-10-CM

## 2024-01-18 DIAGNOSIS — M54.12 CERVICAL RADICULOPATHY: Primary | ICD-10-CM

## 2024-01-18 PROCEDURE — 99999 PR PBB SHADOW E&M-EST. PATIENT-LVL III: CPT | Mod: PBBFAC,,, | Performed by: STUDENT IN AN ORGANIZED HEALTH CARE EDUCATION/TRAINING PROGRAM

## 2024-01-18 PROCEDURE — 99213 OFFICE O/P EST LOW 20 MIN: CPT | Mod: PBBFAC,PN | Performed by: STUDENT IN AN ORGANIZED HEALTH CARE EDUCATION/TRAINING PROGRAM

## 2024-01-18 PROCEDURE — 99214 OFFICE O/P EST MOD 30 MIN: CPT | Mod: S$PBB,,, | Performed by: STUDENT IN AN ORGANIZED HEALTH CARE EDUCATION/TRAINING PROGRAM

## 2024-01-18 NOTE — PROGRESS NOTES
Chronic Pain - f/u    Referring Physician: No ref. provider found    Date: 01/18/2024     Re: Gerda Lai  MR#: [id number]  YOB: 1933  Age: 89 y.o.    Chief Complaint: neck pain  Chief Complaint   Patient presents with    Neck Pain     **This note is dictated using the M*Modal Fluency Direct word recognition program. There are word recognition mistakes that are occasionally missed on review.**    ASSESSMENT: 89 y.o. year old female with neck and arm pain, consistent with     1. Cervical radiculopathy        2. Cervical spondylosis          PLAN:     Cervical spinal stenosis and radiculopathy  -Prior records reviewed. Previous patient of Dr. Blackmon, then Rebeca Gregorio and now to me.  -imaging discussed with patient. Moderate/severe CS and FS at multiple levels. Has autofusion at C3-4.  -2/23/23 - s/p HEIDY on 2/23/23 w/ 80% @2m and 5m. 30-50% @ 11months  -discussed repeat HEIDI. She wants to defer at this   -completed acupuncture. Somewhat helpful    Right knee TKA  -s/p TKA 5/22/23 with great results.  She is walking better and feels better. Knee is not giving out anymore.    - RTC 4 months  - Counseled patient regarding the importance of weight loss and activity modification and physical therapy.    The above plan and management options were discussed at length with patient. Patient is in agreement with the above and verbalized understanding. It will be communicated with the referring physician via electronic record, fax, or mail.  Lab/study reports reviewed were important and necessary because subsequent medical and treatment recommendations required review of the above lab/study reports. Images viewed/reviewed above were important and necessary because subsequent medical and treatment recommendations required review of the reviewed image(s).     Electronically signed by:  Andi Cisneros  DO  01/18/2024    =========================================================================================================    SUBJECTIVE:    Interval History 1/18/2024:   Gerda Lai is a 89 y.o. female presents to the clinic for follow up.  Since last visit the pain has has worsened.  The patient states that the pain is both worse and not worse.  She went to acupuncture which helped some. The pain tends to be the worst in the morning.     The pain is located in the neck area and .does not radiate  The pain is described as aching and stiff     At BEST  2/10   At WORST  6/10 on the WORST day.    On average pain is rated as 6/10.   Today the pain is rated as 6/10  Symptoms interfere with daily activity.   Exacerbating factors: Laying, Extension, and Getting out of bed/chair.    Mitigating factors medications.     Current pain medications: Tylenol arthritis TID-QID. ASA 81.  Failed Pain Medications: Tylenol, tumeric    Pain procedures:   03/2022 - left cervical C5,6,7 RFA  02/24/23 - HEIDY C7-T1 - 80% x6 months    Interval History 7/18/2023:   Gerda Lai is a 89 y.o. female presents to the clinic for follow up.  Since last visit the pain has has significantly improved. The patient is s/p knee surgery on 5/22/23 and that has gone very well.  She is walking more and doing more.  She had acupuncture for the first treatment, but never got a call back for the 2nd one.     The pain is located in the neck area and radiates to the head .  The pain is described as throbbing and tingling    At BEST  0/10   At WORST  3/10 on the WORST day.    On average pain is rated as 2/10.   Today the pain is rated as 2/10  Symptoms interfere with daily activity.   Exacerbating factors: daily activities.    Mitigating factors massage, medications, and physical therapy.     Initial hx:  Gerda Lai is a 89 y.o. female presents to the clinic for the evaluation of neck pain. The pain started 1 month ago following surgery on neck  and  symptoms have been worsening. Patient was seeing Rebeca Gregorio and transferring care to me. She is s/p HEIDY at C7-T1 on 2/20/23 which has been helpful for the pain in the neck and arm.  She continues to have tingling but improvement in the pain.    She is having some other issues especially in the legs which she is being worked up with her PCP/cardiology. She has an appointment with cardiology to evaluate.    Pain Description:    The pain is located in the neck area and radiates to the upper head/ back of the head .    At BEST  1/10   At WORST  4/10 on the WORST day.    On average pain is rated as 1/10.   Today the pain is rated as 1/10  The pain is continuous.  The pain is described as tingling    Symptoms interfere with daily activity and sleeping.   Exacerbating factors: daily activity.    Mitigating factors nothing and rest.   She reports 8 hours of sleep per night.    Physical Therapy/Home Exercise: Yes, currently has a home exercise program. She exercises everyday in the morning.     Current Pain Medications:    - Tylenol arthritis TID-QID. ASA 81.    Failed Pain Medications:    - tylenol, tumeric    Pain Treatment Therapies:    Physical Therapy: does home exercise programs  Chiropractor: none  Acupuncture: none  TENS unit: none  Spinal decompression: none  Joint replacement: hip and knee replacement    Patient denies urinary incontinence, bowel incontinence, and loss of sensations.  Patient denies any suicidal or homicidal ideations     report:  Reviewed and consistent with medication use as prescribed.    Imaging:   MRI Cervical 01/2023:  FINDINGS:  Straightening of the cervical lordosis.  Grade 1 retrolisthesis of C4-C5.  Grade 1 anterolisthesis of C6-C7, C7-T1 and T1-T2.  Degenerative endplate changes throughout the cervical spine.  No fracture or marrow infiltrative process.  Severe disc height loss at C3 through C6.  Suspected fusion of the C3-C4 disc space.  Osseous fusion of the left C2-C3 and C3-C4  facet joints.     Pre dens space is maintained.  Dens is intact.  There is thickening of the transverse ligament with moderate narrowing of the spinal canal between the transverse ligament and posterior arch of C1.     Cervical spinal cord demonstrates normal signal intensity.  Cerebellar tonsils are in their expected location.  Partially visualized posterior fossa is unremarkable.     Vertebral artery flow voids are present.  Paraspinal musculature demonstrates normal bulk and signal intensity.  Note made of cystic right thyroid nodule.     C2-C3: Uncovertebral spurring and severe facet arthropathy result in moderate left neural foraminal narrowing.     C3-C4: Posterior disc osteophyte complex with uncovertebral spurring and severe facet arthropathy result in severe left, moderate right neural foraminal narrowing.     C4-C5: Posterior disc osteophyte complex with uncovertebral spurring and severe facet arthropathy result in moderate spinal canal stenosis and severe bilateral neural foraminal narrowing.     C5-C6: Posterior disc osteophyte complex with uncovertebral spurring and severe facet arthropathy result in mild spinal canal stenosis and severe right neural foraminal narrowing.     C6-C7: Posterior disc osteophyte complex and moderate facet arthropathy noted.  No spinal canal stenosis or neural foraminal narrowing.     C7-T1: Moderate facet arthropathy noted.  No spinal canal stenosis or neural foraminal narrowing.     Impression:     1. Multilevel degenerative changes of the cervical spine detailed above.  Moderate spinal canal stenosis noted at the level of C1 and C4-C5.  Moderate to severe neural foraminal narrowing noted at C2-C6.  2. Fusion across the C3-C4 disc space and left-sided C2-C4 facet joints.    Past Medical History:   Diagnosis Date    Anxiety     Arthritis     Basal cell carcinoma 09/2016    right post auricular neck     Basal cell carcinoma of right forearm 04/19/2023    R lower forearm     Breast cancer 1992    right    Cataract     Fibromyalgia     History of measles as a child     Hyperlipidemia     Hypertension     Personal history of colonic polyps     Pneumonia     SCC (squamous cell carcinoma) 2015    R chest    SCC (squamous cell carcinoma) 2016    left medial shoulder    SCC (squamous cell carcinoma) 2017    left knee    SCC (squamous cell carcinoma) 2022    L upper chest, R upper arm KA types    Shingles     Squamous cell carcinoma 2015    right forearm    Thyroid disease     Vaginitis      Past Surgical History:   Procedure Laterality Date    ADENOIDECTOMY      ARTHROPLASTY, KNEE, TOTAL, USING COMPUTER-ASSISTED NAVIGATION Right 5/22/2023    Procedure: ARTHROPLASTY, KNEE, TOTAL, USING COMPUTER-ASSISTED NAVIGATION: QUYEN: RIGHT: MARTHA - TRIATHALON;  Surgeon: Milton Cobian III, MD;  Location: [facility] OR;  Service: Orthopedics;  Laterality: Right;    BREAST BIOPSY Left     Excisional bx, benign    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    CARPAL TUNNEL RELEASE Right 3/16/2021    Procedure: RELEASE, CARPAL TUNNEL;  Surgeon: Barbara Aldridge MD;  Location: [facility] OR;  Service: Orthopedics;  Laterality: Right;    CATARACT EXTRACTION W/  INTRAOCULAR LENS IMPLANT Bilateral     EPIDURAL STEROID INJECTION N/A 2/24/2023    Procedure: HEIDI C7-T1;  Surgeon: Andi Cisneros DO;  Location: [facility] OR;  Service: Pain Management;  Laterality: N/A;    EYE SURGERY      HAND SURGERY      INJECTION OF ANESTHETIC AGENT AROUND MEDIAL BRANCH NERVES INNERVATING CERVICAL FACET JOINT N/A 1/4/2022    Procedure: Cervical Medial Branch Block C5-6, C6-7 (No Sedation);  Surgeon: Himanshu Blackmon Jr., MD;  Location: [facility] PAIN MGT;  Service: Pain Management;  Laterality: N/A;    INJECTION OF ANESTHETIC AGENT AROUND MEDIAL BRANCH NERVES INNERVATING LUMBAR FACET JOINT Left 11/18/2021    Procedure: Cervical Medial Branch Block Left C5-6, C6-7 (IV Sedation);  Surgeon: Himanshu Blackmon Jr., MD;  Location: [facility] PAIN MGT;  Service:  Pain Management;  Laterality: Left;    JOINT REPLACEMENT Right     ELZBIETA    KNEE ARTHROPLASTY Left 8/10/2020    Procedure: ARTHROPLASTY, KNEE-ADE NEXGEN/CEMENTED TIBIA;  Surgeon: Kalin Pacheco MD;  Location: [facility] OR;  Service: Orthopedics;  Laterality: Left;    RADIOFREQUENCY THERMAL COAGULATION OF MEDIAL BRANCH OF POSTERIOR RAMUS OF CERVICAL SPINAL NERVE Left 3/8/2022    Procedure: RADIOFREQUENCY THERMAL COAGULATION, NERVE, SPINAL, CERVICAL, LEFT C5-6 AND C6-7;  Surgeon: Himanshu Blackmon Jr., MD;  Location: [facility] PAIN T;  Service: Pain Management;  Laterality: Left;  NO PACEMAKER   ASA    thyriodectomy      partial    tonsillectomy      TONSILLECTOMY      TOTAL HIP ARTHROPLASTY      right    Yag  Left      Social History     Socioeconomic History    Marital status: Single   Occupational History     Employer: RETIRED   Tobacco Use    Smoking status: Never     Passive exposure: Never    Smokeless tobacco: Never   Substance and Sexual Activity    Alcohol use: Yes     Comment: socially - celebrations only    Drug use: Never    Sexual activity: Not Currently     Social Determinants of Health     Financial Resource Strain: Low Risk  (8/29/2023)    Overall Financial Resource Strain (CARDIA)     Difficulty of Paying Living Expenses: Not hard at all   Food Insecurity: No Food Insecurity (8/29/2023)    Hunger Vital Sign     Worried About Running Out of Food in the Last Year: Never true     Ran Out of Food in the Last Year: Never true   Transportation Needs: No Transportation Needs (8/29/2023)    PRAPARE - Transportation     Lack of Transportation (Medical): No     Lack of Transportation (Non-Medical): No   Physical Activity: Insufficiently Active (8/29/2023)    Exercise Vital Sign     Days of Exercise per Week: 7 days     Minutes of Exercise per Session: 20 min   Stress: No Stress Concern Present (8/29/2023)    [place] [employer] - Occupational Stress Questionnaire     Feeling of Stress : Only a  little   Social Connections: Moderately Isolated (8/29/2023)    Social Connection and Isolation Panel [NHANES]     Frequency of Communication with Friends and Family: More than three times a week     Frequency of Social Gatherings with Friends and Family: More than three times a week     Attends Mu-ism Services: Never     Active Member of Clubs or Organizations: Yes     Attends Club or Organization Meetings: More than 4 times per year     Marital Status: Never    Housing Stability: Low Risk  (8/29/2023)    Housing Stability Vital Sign     Unable to Pay for Housing in the Last Year: No     Number of Places Lived in the Last Year: 1     Unstable Housing in the Last Year: No     Family History   Problem Relation Age of Onset    Breast cancer Mother     Cancer Mother     Stroke Paternal Grandfather     Heart disease Father     Cancer Paternal Aunt     Heart disease Paternal Aunt     Glaucoma Paternal Grandmother     Amblyopia Neg Hx     Blindness Neg Hx     Cataracts Neg Hx     Diabetes Neg Hx     Hypertension Neg Hx     Macular degeneration Neg Hx     Retinal detachment Neg Hx     Strabismus Neg Hx     Thyroid disease Neg Hx     Melanoma Neg Hx        Review of patient's allergies indicates:   Allergen Reactions    Sulfa (sulfonamide antibiotics) Rash    Clarithromycin Other (See Comments)     Weak, extreme fatigue. dizziness    Flexeril [cyclobenzaprine] Other (See Comments)     Dizziness    Iodine and iodide containing products     Lisinopril Other (See Comments)     Cough and sensation of throat swelling/?angioedema    Losartan Rash    Metoprolol Swelling     Tightness in throat    Spironolactone      Throat tightening    Tramadol Other (See Comments)     Dizzy and weak    Verapamil (bulk) Palpitations    Voltaren [diclofenac sodium] Other (See Comments)     Drops blood pressure       Current Outpatient Medications   Medication Sig    acetaminophen (TYLENOL) 650 MG TbSR Take 1 tablet (650 mg total) by  mouth every 8 (eight) hours.    aliskiren (TEKTURNA) 300 MG Tab TAKE 1 TABLET BY MOUTH EVERY DAY    aspirin (ECOTRIN) 81 MG EC tablet TAKE 1 TABLET BY MOUTH EVERY DAY    b complex vitamins tablet Take 1 tablet by mouth once daily.    coenzyme Q10 100 mg capsule Take 100 mg by mouth once daily.    ezetimibe (ZETIA) 10 mg tablet Take 1 tablet (10 mg total) by mouth once daily.    glucosamine-chondroitin 500-400 mg tablet Take 1 tablet by mouth once daily.     labetaloL (NORMODYNE) 100 MG tablet TAKE 1.5 TABLETS (150 MG TOTAL) BY MOUTH EVERY 12 (TWELVE) HOURS.    LORazepam (ATIVAN) 0.5 MG tablet TAKE 1/2 TABLET TO 1 TABLET BY MOUTH EVERY 12 HOURS AS NEEDED FOR ANXIETY    multivitamin capsule Take 1 capsule by mouth once daily.    omeprazole (PRILOSEC OTC) 20 MG tablet Take 20 mg by mouth once daily.    pravastatin (PRAVACHOL) 80 MG tablet Take 1 tablet (80 mg total) by mouth once daily.    psyllium husk, aspartame, (METAMUCIL) 3.4 gram PwPk packet Take 1 packet by mouth once daily.    TURMERIC ORAL Take 500 mg by mouth once daily.     vitamin D (VITAMIN D3) 1000 units Tab Take 1,000 Units by mouth once daily.     No current facility-administered medications for this visit.       REVIEW OF SYSTEMS:    GENERAL:  No weight loss, malaise or fevers.  HEENT:   No recent changes in vision or hearing  NECK:  Negative for lumps, no difficulty with swallowing.  RESPIRATORY:  Negative for cough, wheezing or shortness of breath, patient denies any recent URI.  CARDIOVASCULAR:  Negative for chest pain, leg swelling or palpitations. + leg swelling   GI:  Negative for abdominal discomfort, blood in stools or black stools or change in bowel habits.  MUSCULOSKELETAL:  See HPI.  SKIN:  Negative for lesions, rash, and itching. + ALL  PSYCH:  No mood disorder or recent psychosocial stressors.  Patients sleep is not disturbed secondary to pain.  HEMATOLOGY/LYMPHOLOGY:  Negative for prolonged bleeding, bruising easily or swollen nodes.   "Patient is not currently taking any anti-coagulants  NEURO:   No history of headaches, syncope, paralysis, seizures or tremors.  All other reviewed and negative other than HPI.    OBJECTIVE:    /69 (BP Location: Left arm, Patient Position: Sitting)   Pulse 61   Ht 5' 2" (1.575 m)   Wt 62 kg (136 lb 11 oz)   BMI 25.00 kg/m²     PHYSICAL EXAMINATION:    GENERAL: Well appearing, in no acute distress, alert and oriented x3.  PSYCH:  Mood and affect appropriate.  SKIN: Skin color, texture, turgor normal, no rashes or lesions.  HEAD/FACE:  Normocephalic, atraumatic. Cranial nerves grossly intact.    NECK:   - mild Positive pain to palpation over the cervical paraspinous muscles.   - Spurling  Negative.  - Negative pain with neck flexion, extension, or lateral flexion.     CV: RRR with palpation of the radial artery.  PULM: CTAB. No evidence of respiratory difficulty, symmetric chest rise.  GI:  Soft and non-tender.    MUSKULOSKELETAL:    Mild edema in both legs  Difficulty with right shoudler abduction and flexion. Cannot raise     NEURO: Bilateral upper and lower extremity coordination and muscle stretch reflexes are physiologic and symmetric.      NEUROLOGICAL EXAM:  MENTAL STATUS: A x O x 3, good concentration, speech is fluent and goal directed  MEMORY: recent and remote are intact  CN: CN2-12 grossly intact  MOTOR: 5/5 in all muscle groups. Right shoulder abduction 2/5, shoulder flexion 1/5.   DTRs: 2+ intact symmetric  Sensation:    -no Loss of sensation in a left upper and right upper C-4, C-5, C-6, C-7, and C-8 bilaterally distribution.  Hicks: absent on the bilateral side(s)  Clonus: absent on the bilateral side(s)    GAIT: normal.      "

## 2024-01-26 ENCOUNTER — TELEPHONE (OUTPATIENT)
Dept: DERMATOLOGY | Facility: CLINIC | Age: 89
End: 2024-01-26
Payer: MEDICARE

## 2024-01-26 NOTE — TELEPHONE ENCOUNTER
----- Message from Karen Pacheco LPN sent at 1/23/2024 10:28 AM CST -----  Regarding: FW: apt  Contact: [phone]    ----- Message -----  From: Suzy Arreaga  Sent: 1/22/2024   4:11 PM CST  To: Kirsten ZABALA Staff  Subject: apt                                              Pt calling in to schedule apt for moles please call to discuss further

## 2024-01-31 ENCOUNTER — PATIENT MESSAGE (OUTPATIENT)
Dept: DERMATOLOGY | Facility: CLINIC | Age: 89
End: 2024-01-31
Payer: MEDICARE

## 2024-02-03 ENCOUNTER — PATIENT MESSAGE (OUTPATIENT)
Dept: DERMATOLOGY | Facility: CLINIC | Age: 89
End: 2024-02-03
Payer: MEDICARE

## 2024-02-12 DIAGNOSIS — I10 HYPERTENSION, ESSENTIAL: Chronic | ICD-10-CM

## 2024-02-12 NOTE — TELEPHONE ENCOUNTER
No care due was identified.  [facility] Embedded Care Due Messages. Reference number: [id number].   2/12/2024 4:15:07 PM CST

## 2024-02-13 RX ORDER — LORAZEPAM 0.5 MG/1
TABLET ORAL
Qty: 30 TABLET | Refills: 0 | Status: SHIPPED | OUTPATIENT
Start: 2024-02-13 | End: 2024-04-12

## 2024-02-13 RX ORDER — ALISKIREN 300 MG/1
TABLET, FILM COATED ORAL
Qty: 90 TABLET | Refills: 2 | Status: SHIPPED | OUTPATIENT
Start: 2024-02-13

## 2024-02-27 ENCOUNTER — TELEPHONE (OUTPATIENT)
Dept: INTERNAL MEDICINE | Facility: CLINIC | Age: 89
End: 2024-02-27
Payer: MEDICARE

## 2024-02-27 NOTE — TELEPHONE ENCOUNTER
----- Message from Monika Pinedo PharmD sent at 2/27/2024  2:23 PM CST -----  Regarding: BP check  Hello    Could you please schedule Ms Lai for a BP check/nurse visit? Her [employer] BP device is reading very high and I am concerned the readings are not accurate. I advised her to bring the device with her to the appointment     Thank you so much  Monika Pinedo PharmD

## 2024-02-29 ENCOUNTER — CLINICAL SUPPORT (OUTPATIENT)
Dept: INTERNAL MEDICINE | Facility: CLINIC | Age: 89
End: 2024-02-29
Payer: MEDICARE

## 2024-02-29 VITALS — DIASTOLIC BLOOD PRESSURE: 74 MMHG | SYSTOLIC BLOOD PRESSURE: 132 MMHG

## 2024-02-29 DIAGNOSIS — I10 HYPERTENSION, ESSENTIAL: Primary | ICD-10-CM

## 2024-03-19 ENCOUNTER — OFFICE VISIT (OUTPATIENT)
Dept: URGENT CARE | Facility: CLINIC | Age: 89
End: 2024-03-19
Payer: MEDICARE

## 2024-03-19 VITALS
HEART RATE: 78 BPM | RESPIRATION RATE: 18 BRPM | SYSTOLIC BLOOD PRESSURE: 178 MMHG | DIASTOLIC BLOOD PRESSURE: 70 MMHG | HEIGHT: 62 IN | OXYGEN SATURATION: 98 % | BODY MASS INDEX: 25.03 KG/M2 | TEMPERATURE: 99 F | WEIGHT: 136 LBS
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
## 2024-03-19 DIAGNOSIS — M25.511 ACUTE PAIN OF RIGHT SHOULDER: Primary | ICD-10-CM

## 2024-03-19 DIAGNOSIS — M54.6 ACUTE MIDLINE THORACIC BACK PAIN: ICD-10-CM

## 2024-03-19 PROCEDURE — 72040 X-RAY EXAM NECK SPINE 2-3 VW: CPT | Mod: FY,S$GLB,, | Performed by: RADIOLOGY

## 2024-03-19 PROCEDURE — 72070 X-RAY EXAM THORAC SPINE 2VWS: CPT | Mod: FY,S$GLB,, | Performed by: RADIOLOGY

## 2024-03-19 PROCEDURE — 99213 OFFICE O/P EST LOW 20 MIN: CPT | Mod: S$GLB,,,

## 2024-03-19 PROCEDURE — 73030 X-RAY EXAM OF SHOULDER: CPT | Mod: FY,RT,S$GLB, | Performed by: RADIOLOGY

## 2024-03-19 NOTE — PATIENT INSTRUCTIONS
Continue Tylenol as needed for pain.  Follow up with Orthopedics as scheduled.  X-rays are negative    What problems could happen?   Long-term back pain  Loss of feeling or movement in the legs, feet, arms, or hands  Weight gain, less muscle strength and flexibility, weaker bones  Need for surgery  Problem walking and with daily activities  Infection  Loss of bowel and bladder function  Long-term spinal cord injury. This is rare.  What can be done to prevent this health problem?   Stay active and work out to keep your muscles strong and flexible. Warm up slowly and stretch before you exercise.  Heated muscles stretch better than cool muscles. Warm up slowly and stretch before you exercise.  Use good posture.  Use proper ways to lift and bend:  Spread your feet apart so you have a good base of support. Then, bend with your knees when you  something from the ground.  When lifting and moving an object, keep your back straight. Keep the object as close to your body as possible. Do not twist. Instead, move your feet to the direction you are going.  Take breaks often when seated for long periods of time. Get up and walk around from time to time.  If you stand for long periods, put one leg up on a small stool for a while. Then, change legs.  If you sleep on your side, put a pillow in between your knees to keep your back and legs in a good position.  Keep a healthy weight.  Avoid smoking.  When do I need to call the doctor?   More pain or numbness in your leg, foot, arm, or hand  Loss of control of urine or stools  - Rest.    - Drink plenty of fluids.    - Acetaminophen (tylenol) or Ibuprofen (advil,motrin) as directed as needed for fever/pain. Avoid tylenol if you have a history of liver disease. Do not take ibuprofen if you have a history of GI bleeding, kidney disease, or if you take blood thinners.     - Follow up with your PCP or specialty clinic as directed in the next 1-2 weeks if not improved or as needed.   You can call [phone] to schedule an appointment with the appropriate provider.    - Go to the ER or seek medical attention immediately if you develop new or worsening symptoms.     - You must understand that you have received an Urgent Care treatment only and that you may be released before all of your medical problems are known or treated.   - You, the patient, will arrange for follow up care as instructed.   - If your condition worsens or fails to improve we recommend that you receive another evaluation at the ER immediately or contact your PCP to discuss your concerns or return here.

## 2024-03-19 NOTE — PROGRESS NOTES
"Subjective:      Patient ID: Gerda Lai is a 89 y.o. female.    Vitals:  height is 5' 2.01" (1.575 m) and weight is 61.7 kg (136 lb). Her tympanic temperature is 98.6 °F (37 °C). Her blood pressure is 178/70 (abnormal) and her pulse is 78. Her respiration is 18 and oxygen saturation is 98%.     Chief Complaint: Neck Pain    89-year-old female patient presents with pain to the cervical and thoracic spine after trip and fall about 6 days ago.  Patient states she fell and landed on her left side.  Denies LOC.  No changes in neurological status.  Patient states she also has a history of rotator cuff tear to the right shoulder joint.  Patient denies any injury or trauma to the right shoulder but reports she has been having difficulty raising right shoulder.  Patient has full range of motion of the left shoulder joint.  Patient has full range of motion of her neck and cervical spine but reports mild pain.  Patient states she has been taking Tylenol for pain with mild relief.  Patient states she has an appointment with with her Orthopedic doctor next month.        Neck Pain   This is a new problem. The current episode started in the past 7 days. The problem occurs constantly. The problem has been unchanged. The pain is associated with nothing. The pain is present in the left side and anterior neck. The patient is experiencing no pain. Stiffness is present All day. Pertinent negatives include no chest pain or trouble swallowing. Associated symptoms comments: Light headedness . The treatment provided mild relief.       Constitution: Negative for activity change, appetite change, chills, sweating and fatigue.   HENT:  Negative for ear pain, ear discharge, foreign body in ear, tinnitus, hearing loss, sore throat, trouble swallowing and voice change.    Neck: Positive for neck pain and neck stiffness. Negative for painful lymph nodes, neck swelling and degenerative disc disease.   Cardiovascular:  Negative for chest " trauma, chest pain and leg swelling.   Eyes:  Negative for eye trauma, foreign body in eye, eye discharge, eye itching and eye pain.   Respiratory:  Negative for sleep apnea, chest tightness, cough, sputum production and asthma.    Gastrointestinal:  Negative for abdominal trauma, abdominal pain, abdominal bloating, history of abdominal surgery and nausea.   Endocrine: hair loss, cold intolerance and heat intolerance.   Genitourinary:  Negative for dysuria, frequency, urgency, urine decreased and flank pain.   Musculoskeletal:  Positive for joint pain, abnormal ROM of joint and back pain. Negative for pain, trauma, joint swelling, arthritis, gout and muscle cramps.   Skin:  Negative for color change, pale, rash, wound, abrasion and erythema.   Allergic/Immunologic: Negative for environmental allergies, seasonal allergies, food allergies, eczema and asthma.   Neurological:  Negative for dizziness, history of vertigo, light-headedness, passing out, facial drooping, disorientation and altered mental status.   Hematologic/Lymphatic: Negative for swollen lymph nodes, easy bruising/bleeding and trouble clotting. Does not bruise/bleed easily.   Psychiatric/Behavioral:  Negative for altered mental status, disorientation, confusion, agitation, nervous/anxious and sleep disturbance. The patient is not nervous/anxious.       Objective:     Physical Exam   Constitutional: She is oriented to person, place, and time. She appears well-developed. She is cooperative. No distress.   HENT:   Head: Normocephalic and atraumatic.   Nose: Nose normal.   Mouth/Throat: Oropharynx is clear and moist and mucous membranes are normal.   Eyes: Conjunctivae and lids are normal.   Neck: Trachea normal and phonation normal. Neck supple.   Cardiovascular: Normal rate, regular rhythm, normal heart sounds and normal pulses.   Pulmonary/Chest: Effort normal and breath sounds normal.   Abdominal: Normal appearance and bowel sounds are normal. She  exhibits no mass. Soft.   Musculoskeletal:         General: Tenderness present. No swelling, deformity or signs of injury.      Right lower leg: No edema.      Left lower leg: No edema.   Neurological: She is alert and oriented to person, place, and time. She has normal strength and normal reflexes. No sensory deficit.   Skin: Skin is warm, dry, intact, not diaphoretic, not pale and no rash. No bruising, No erythema and No lesion jaundice  Psychiatric: Her speech is normal and behavior is normal. Judgment and thought content normal.   Nursing note and vitals reviewed.  X-Ray Shoulder Trauma 3 view Right    Result Date: 3/19/2024  EXAMINATION: XR SHOULDER TRAUMA 3 VIEW RIGHT CLINICAL HISTORY: Pain in right shoulder TECHNIQUE: Three or four views of the right shoulder were performed. COMPARISON: 10/31/2019. FINDINGS: There is diffuse osteopenia.  There is no acute fracture or dislocation of the shoulder.  The humeral head is well seated in the glenoid.  There is elevation of the humeral head which can be seen with rotator cuff dysfunction.  There is joint space narrowing of the glenohumeral joint with bulky marginal osteophytes.  There is mild joint space narrowing of the acromioclavicular joint with marginal osteophytes.  Remaining visualized osseous structures are intact.     No acute fracture or subluxation. Degenerative changes as above. Electronically signed by: Nabeel Goncalves Date:    03/19/2024 Time:    18:04    X-Ray Thoracic Spine AP Lateral    Result Date: 3/19/2024  EXAMINATION: XR THORACIC SPINE AP LATERAL CLINICAL HISTORY: Unspecified fall, initial encounter TECHNIQUE: AP and lateral views of the thoracic spine were performed. COMPARISON: 09/28/2015. FINDINGS: There is no evidence of acute fracture or subluxation of the thoracic spine.  Vertebral body heights are preserved.  There are multilevel degenerative changes of the thoracic spine, similar to prior.  Alignment is normal.  Remaining visualized  osseous structures are intact.     No acute fracture or subluxation. Multilevel degenerative changes. Electronically signed by: Nabeel Goncalves Date:    03/19/2024 Time:    18:03    X-Ray Cervical Spine 2 or 3 Views    Result Date: 3/19/2024  EXAMINATION: XR CERVICAL SPINE 2 OR 3 VIEWS CLINICAL HISTORY: Unspecified fall, initial encounter TECHNIQUE: AP, lateral and open mouth views of the cervical spine were performed. COMPARISON: 12/05/2022. FINDINGS: There is no evidence of an acute fracture or subluxation.  There is mild anterolisthesis of C6 on C7.  There is straightening or reversal of the usual cervical lordosis.  Alignment is unchanged from prior.  There are continued severe degenerative changes of the cervical spine, with unchanged appearance from prior.  There is severe disc height loss at C3-C4, C4-C5, and moderate disc height loss at C5-C6.  There are bulky marginal osteophytes.  There is degenerative vertebral body height loss at C5, unchanged.  There is multilevel bilateral uncovertebral joint spurring and facet arthropathy.  Prevertebral soft tissues are within normal limits.  Remaining visualized osseous structures are intact.  Bilateral auditory devices noted.     No acute fracture or subluxation of the cervical spine. Severe multilevel degenerative changes, similar to prior. Electronically signed by: Nabeel Goncalves Date:    03/19/2024 Time:    18:01      Assessment:     1. Acute pain of right shoulder    2. Fall, initial encounter    3. Acute midline thoracic back pain        Plan:       Acute pain of right shoulder  -     X-Ray Shoulder Trauma 3 view Right; Future; Expected date: 03/19/2024    Fall, initial encounter  -     X-Ray Cervical Spine 2 or 3 Views; Future; Expected date: 03/19/2024  -     X-Ray Thoracic Spine AP Lateral; Future; Expected date: 03/19/2024    Acute midline thoracic back pain

## 2024-04-12 DIAGNOSIS — I10 HYPERTENSION, ESSENTIAL: Chronic | ICD-10-CM

## 2024-04-12 RX ORDER — LORAZEPAM 0.5 MG/1
TABLET ORAL
Qty: 30 TABLET | Refills: 0 | Status: SHIPPED | OUTPATIENT
Start: 2024-04-12 | End: 2024-06-17

## 2024-04-12 NOTE — TELEPHONE ENCOUNTER
No care due was identified.  [facility] Embedded Care Due Messages. Reference number: [id number].   4/12/2024 1:16:04 PM CDT

## 2024-05-01 ENCOUNTER — TELEPHONE (OUTPATIENT)
Dept: INTERNAL MEDICINE | Facility: CLINIC | Age: 89
End: 2024-05-01
Payer: MEDICARE

## 2024-05-01 NOTE — TELEPHONE ENCOUNTER
----- Message from Demi Manuel sent at 5/1/2024  3:55 PM CDT -----  Contact: [phone]  Pt is returning a call in regards to rescheduling her appt for tomorrow. The next available appt is 06/18. Pt would like a callback to schedule a sooner appt w/Dr Beltran. Please call.                   Thank you

## 2024-05-07 ENCOUNTER — OFFICE VISIT (OUTPATIENT)
Dept: INTERNAL MEDICINE | Facility: CLINIC | Age: 89
End: 2024-05-07
Payer: MEDICARE

## 2024-05-07 VITALS
DIASTOLIC BLOOD PRESSURE: 70 MMHG | BODY MASS INDEX: 27.1 KG/M2 | OXYGEN SATURATION: 98 % | HEART RATE: 70 BPM | HEIGHT: 62 IN | RESPIRATION RATE: 12 BRPM | WEIGHT: 147.25 LBS | SYSTOLIC BLOOD PRESSURE: 122 MMHG | TEMPERATURE: 99 F

## 2024-05-07 DIAGNOSIS — I70.0 AORTIC ATHEROSCLEROSIS: Chronic | ICD-10-CM

## 2024-05-07 DIAGNOSIS — N18.32 STAGE 3B CHRONIC KIDNEY DISEASE: Chronic | ICD-10-CM

## 2024-05-07 DIAGNOSIS — I65.23 BILATERAL CAROTID ARTERY STENOSIS: Chronic | ICD-10-CM

## 2024-05-07 DIAGNOSIS — I10 HYPERTENSION, ESSENTIAL: Primary | Chronic | ICD-10-CM

## 2024-05-07 DIAGNOSIS — E78.00 PURE HYPERCHOLESTEROLEMIA: Chronic | ICD-10-CM

## 2024-05-07 PROCEDURE — 99213 OFFICE O/P EST LOW 20 MIN: CPT | Mod: PBBFAC,PO | Performed by: INTERNAL MEDICINE

## 2024-05-07 PROCEDURE — G2211 COMPLEX E/M VISIT ADD ON: HCPCS | Mod: S$PBB,,, | Performed by: INTERNAL MEDICINE

## 2024-05-07 PROCEDURE — 99999 PR PBB SHADOW E&M-EST. PATIENT-LVL III: CPT | Mod: PBBFAC,,, | Performed by: INTERNAL MEDICINE

## 2024-05-07 PROCEDURE — 99214 OFFICE O/P EST MOD 30 MIN: CPT | Mod: S$PBB,,, | Performed by: INTERNAL MEDICINE

## 2024-05-07 NOTE — PROGRESS NOTES
Subjective:       Patient ID: Gerda Lai is a 89 y.o. female.    Chief Complaint: Follow-up    HPI    89-year-old female with stage IIIB chronic kidney disease here for follow-up.    HTN -  Patient is currently on labetalol 150 mg 2 times daily. She does not check her BP at home.  She has had trouble with the digital hypertension program.  She has been to the [facility] and the machine was fixed.  It did not work the day after this. Side effects of medications note: none. Denies headaches, blurred vision, chest pain, shortness of breath, nausea.  As soon as she takes the labetalol, she feel crazy and it takes her 2-3 hours to recover.  She took the whole tablet one day and several hours later a half tablet.  She felt better with this change.    HLD/aortic atherosclerosis/carotid artery disease - Patient is currently on pravastatin 80 mg, Zetia 10 mg.  His last lipid panel was   Cholesterol   Date Value Ref Range Status   11/02/2023 166 120 - 199 mg/dL Final     Comment:     The National Cholesterol Education Program (NCEP) has set the  following guidelines (reference ranges) for Cholesterol:  Optimal.....................<200 mg/dL  Borderline High.............200-239 mg/dL  High........................> or = 240 mg/dL       Triglycerides   Date Value Ref Range Status   11/02/2023 141 30 - 150 mg/dL Final     Comment:     The National Cholesterol Education Program (NCEP) has set the  following guidelines (reference values) for triglycerides:  Normal......................<150 mg/dL  Borderline High.............150-199 mg/dL  High........................200-499 mg/dL       HDL   Date Value Ref Range Status   11/02/2023 54 40 - 75 mg/dL Final     Comment:     The National Cholesterol Education Program (NCEP) has set the  following guidelines (reference values) for HDL Cholesterol:  Low...............<40 mg/dL  Optimal...........>60 mg/dL       LDL Cholesterol   Date Value Ref Range Status   11/02/2023 83.8 63.0 - 159.0  mg/dL Final     Comment:     The National Cholesterol Education Program (NCEP) has set the  following guidelines (reference values) for LDL Cholesterol:  Optimal.......................<130 mg/dL  Borderline High...............130-159 mg/dL  High..........................160-189 mg/dL  Very High.....................>190 mg/dL     .  Side effects of the medication: none.    Review of Systems      Objective:      Physical Exam  Vitals reviewed.   Constitutional:       Appearance: She is well-developed.   HENT:      Head: Normocephalic and atraumatic.      Mouth/Throat:      Pharynx: No oropharyngeal exudate.   Eyes:      General: No scleral icterus.        Right eye: No discharge.         Left eye: No discharge.      Pupils: Pupils are equal, round, and reactive to light.   Neck:      Thyroid: No thyromegaly.      Trachea: No tracheal deviation.   Cardiovascular:      Rate and Rhythm: Normal rate and regular rhythm.      Heart sounds: Normal heart sounds. No murmur heard.     No friction rub. No gallop.   Pulmonary:      Effort: Pulmonary effort is normal. No respiratory distress.      Breath sounds: Normal breath sounds. No wheezing or rales.   Chest:      Chest wall: No tenderness.   Abdominal:      General: Bowel sounds are normal. There is no distension.      Palpations: Abdomen is soft. There is no mass.      Tenderness: There is no abdominal tenderness. There is no guarding or rebound.   Musculoskeletal:         General: No tenderness. Normal range of motion.      Cervical back: Normal range of motion and neck supple.   Skin:     General: Skin is warm and dry.      Coloration: Skin is not pale.      Findings: No erythema or rash.   Neurological:      Mental Status: She is alert and oriented to person, place, and time.   Psychiatric:         Behavior: Behavior normal.         Assessment:       1. Hypertension, essential    2. Pure hypercholesterolemia    3. Aortic atherosclerosis    4. Bilateral carotid artery  stenosis    5. Stage 3b chronic kidney disease      Plan:       1. Decrease labetalol to 100 mg b.i.d..  Send blood pressures in week.  2/3/4.  Continue Zetia 10 mg, pravastatin 80 mg.  5. Monitor.    Return to clinic in 6 months for annual exam.

## 2024-05-20 ENCOUNTER — TELEPHONE (OUTPATIENT)
Dept: PAIN MEDICINE | Facility: CLINIC | Age: 89
End: 2024-05-20

## 2024-05-20 ENCOUNTER — OFFICE VISIT (OUTPATIENT)
Dept: PAIN MEDICINE | Facility: CLINIC | Age: 89
End: 2024-05-20
Payer: MEDICARE

## 2024-05-20 VITALS
WEIGHT: 134.69 LBS | HEART RATE: 57 BPM | SYSTOLIC BLOOD PRESSURE: 200 MMHG | BODY MASS INDEX: 24.78 KG/M2 | DIASTOLIC BLOOD PRESSURE: 75 MMHG | HEIGHT: 62 IN

## 2024-05-20 DIAGNOSIS — M54.12 CERVICAL RADICULOPATHY: Primary | ICD-10-CM

## 2024-05-20 PROCEDURE — 99212 OFFICE O/P EST SF 10 MIN: CPT | Mod: PBBFAC,PN | Performed by: STUDENT IN AN ORGANIZED HEALTH CARE EDUCATION/TRAINING PROGRAM

## 2024-05-20 PROCEDURE — 99999 PR PBB SHADOW E&M-EST. PATIENT-LVL II: CPT | Mod: PBBFAC,,, | Performed by: STUDENT IN AN ORGANIZED HEALTH CARE EDUCATION/TRAINING PROGRAM

## 2024-05-20 PROCEDURE — 99214 OFFICE O/P EST MOD 30 MIN: CPT | Mod: S$PBB,,, | Performed by: STUDENT IN AN ORGANIZED HEALTH CARE EDUCATION/TRAINING PROGRAM

## 2024-05-20 NOTE — H&P (VIEW-ONLY)
Chronic Pain - f/u    Referring Physician: No ref. provider found    Date: 05/20/2024     Re: Gerda Lai  MR#: [id number]  YOB: 1933  Age: 89 y.o.    Chief Complaint: neck pain  Chief Complaint   Patient presents with    Hand Pain    Neck Pain     **This note is dictated using the M*Modal Fluency Direct word recognition program. There are word recognition mistakes that are occasionally missed on review.**    ASSESSMENT: 89 y.o. year old female with neck and arm pain, consistent with     1. Cervical radiculopathy  Procedure Order to Pain Management          PLAN:     Cervical spinal stenosis and radiculopathy  -Prior records reviewed. Previous patient of Dr. Blackmon, then Rebeca Gregorio and now to me.  -imaging discussed with patient. Moderate/severe CS and FS at multiple levels. Has autofusion at C3-4.  -2/23/23 - s/p HEIDY on 2/23/23 w/ 80% @2m and 5m. 30-50% @ 11months  -discussed repeat HEIDI. She would like to schedule this.  6/10/24 - HEIDY C7-T1 (toward the left) - RN sed w/Versed and Benadryl - no AC  -completed acupuncture. Somewhat helpful    Right knee TKA  -s/p TKA 5/22/23 with great results.  She is walking better and feels better. Knee is not giving out anymore.    - RTC mid July  - Counseled patient regarding the importance of weight loss and activity modification and physical therapy.    The above plan and management options were discussed at length with patient. Patient is in agreement with the above and verbalized understanding. It will be communicated with the referring physician via electronic record, fax, or mail.  Lab/study reports reviewed were important and necessary because subsequent medical and treatment recommendations required review of the above lab/study reports. Images viewed/reviewed above were important and necessary because subsequent medical and treatment recommendations required review of the reviewed image(s).     Electronically signed by:  Andi Cisneros  "DO  05/20/2024    =========================================================================================================    SUBJECTIVE:    Interval History 5/20/2024:   Gerda Lai is a 89 y.o. female presents to the clinic for follow up.  Since last visit the pain has has worsened.  Patient reports that things have not gotten any better in the last few months.  If anything it is slightly worse. Patient uses Aspercreme which helps.    The pain is located in the neck and arm pain and radiates to the hands.  The pain is described as tingling and "stiffness"    At BEST  2/10   At WORST  6/10 on the WORST day.    On average pain is rated as 4/10.   Today the pain is rated as 4/10  Symptoms interfere with sleeping.   Exacerbating factors: activity.    Mitigating factors nothing and rest.     Current pain medications: Tylenol arthritis TID-QID. ASA 81.  Failed Pain Medications: Tylenol, tumeric    Pain procedures:   03/2022 - left cervical C5,6,7 RFA  02/24/23 - HEIDY C7-T1 - 80% x6 months. 30-50%@11m    Interval History 1/18/2024:   Gerda Lai is a 89 y.o. female presents to the clinic for follow up.  Since last visit the pain has has worsened.  The patient states that the pain is both worse and not worse.  She went to acupuncture which helped some. The pain tends to be the worst in the morning.     The pain is located in the neck area and .does not radiate  The pain is described as aching and stiff     At BEST  2/10   At WORST  6/10 on the WORST day.    On average pain is rated as 6/10.   Today the pain is rated as 6/10  Symptoms interfere with daily activity.   Exacerbating factors: Laying, Extension, and Getting out of bed/chair.    Mitigating factors medications.     Interval History 7/18/2023:   Gerda Lai is a 89 y.o. female presents to the clinic for follow up.  Since last visit the pain has has significantly improved. The patient is s/p knee surgery on 5/22/23 and that has gone very well.  She is " walking more and doing more.  She had acupuncture for the first treatment, but never got a call back for the 2nd one.     The pain is located in the neck area and radiates to the head .  The pain is described as throbbing and tingling    At BEST  0/10   At WORST  3/10 on the WORST day.    On average pain is rated as 2/10.   Today the pain is rated as 2/10  Symptoms interfere with daily activity.   Exacerbating factors: daily activities.    Mitigating factors massage, medications, and physical therapy.     Initial hx:  Gerda Lai is a 89 y.o. female presents to the clinic for the evaluation of neck pain. The pain started 1 month ago following surgery on neck  and symptoms have been worsening. Patient was seeing Rebeca Gregorio and transferring care to me. She is s/p HEIDY at C7-T1 on 2/20/23 which has been helpful for the pain in the neck and arm.  She continues to have tingling but improvement in the pain.    She is having some other issues especially in the legs which she is being worked up with her PCP/cardiology. She has an appointment with cardiology to evaluate.    Pain Description:    The pain is located in the neck area and radiates to the upper head/ back of the head .    At BEST  1/10   At WORST  4/10 on the WORST day.    On average pain is rated as 1/10.   Today the pain is rated as 1/10  The pain is continuous.  The pain is described as tingling    Symptoms interfere with daily activity and sleeping.   Exacerbating factors: daily activity.    Mitigating factors nothing and rest.   She reports 8 hours of sleep per night.    Physical Therapy/Home Exercise: Yes, currently has a home exercise program. She exercises everyday in the morning.     Current Pain Medications:    - Tylenol arthritis TID-QID. ASA 81.    Failed Pain Medications:    - tylenol, tumeric    Pain Treatment Therapies:    Physical Therapy: does home exercise programs  Chiropractor: none  Acupuncture: none  TENS unit: none  Spinal  decompression: none  Joint replacement: hip and knee replacement    Patient denies urinary incontinence, bowel incontinence, and loss of sensations.  Patient denies any suicidal or homicidal ideations     report:  Reviewed and consistent with medication use as prescribed.    Imaging:   MRI Cervical 01/2023:  FINDINGS:  Straightening of the cervical lordosis.  Grade 1 retrolisthesis of C4-C5.  Grade 1 anterolisthesis of C6-C7, C7-T1 and T1-T2.  Degenerative endplate changes throughout the cervical spine.  No fracture or marrow infiltrative process.  Severe disc height loss at C3 through C6.  Suspected fusion of the C3-C4 disc space.  Osseous fusion of the left C2-C3 and C3-C4 facet joints.     Pre dens space is maintained.  Dens is intact.  There is thickening of the transverse ligament with moderate narrowing of the spinal canal between the transverse ligament and posterior arch of C1.     Cervical spinal cord demonstrates normal signal intensity.  Cerebellar tonsils are in their expected location.  Partially visualized posterior fossa is unremarkable.     Vertebral artery flow voids are present.  Paraspinal musculature demonstrates normal bulk and signal intensity.  Note made of cystic right thyroid nodule.     C2-C3: Uncovertebral spurring and severe facet arthropathy result in moderate left neural foraminal narrowing.     C3-C4: Posterior disc osteophyte complex with uncovertebral spurring and severe facet arthropathy result in severe left, moderate right neural foraminal narrowing.     C4-C5: Posterior disc osteophyte complex with uncovertebral spurring and severe facet arthropathy result in moderate spinal canal stenosis and severe bilateral neural foraminal narrowing.     C5-C6: Posterior disc osteophyte complex with uncovertebral spurring and severe facet arthropathy result in mild spinal canal stenosis and severe right neural foraminal narrowing.     C6-C7: Posterior disc osteophyte complex and moderate  facet arthropathy noted.  No spinal canal stenosis or neural foraminal narrowing.     C7-T1: Moderate facet arthropathy noted.  No spinal canal stenosis or neural foraminal narrowing.     Impression:     1. Multilevel degenerative changes of the cervical spine detailed above.  Moderate spinal canal stenosis noted at the level of C1 and C4-C5.  Moderate to severe neural foraminal narrowing noted at C2-C6.  2. Fusion across the C3-C4 disc space and left-sided C2-C4 facet joints.    Past Medical History:   Diagnosis Date    Anxiety     Arthritis     Basal cell carcinoma 09/2016    right post auricular neck     Basal cell carcinoma of right forearm 04/19/2023    R lower forearm    Breast cancer 1992    right    Cataract     Fibromyalgia     History of measles as a child     Hyperlipidemia     Hypertension     Personal history of colonic polyps     Pneumonia     SCC (squamous cell carcinoma) 2015    R chest    SCC (squamous cell carcinoma) 2016    left medial shoulder    SCC (squamous cell carcinoma) 2017    left knee    SCC (squamous cell carcinoma) 2022    L upper chest, R upper arm KA types    Shingles     Squamous cell carcinoma 2015    right forearm    Thyroid disease     Vaginitis      Past Surgical History:   Procedure Laterality Date    ADENOIDECTOMY      ARTHROPLASTY, KNEE, TOTAL, USING COMPUTER-ASSISTED NAVIGATION Right 5/22/2023    Procedure: ARTHROPLASTY, KNEE, TOTAL, USING COMPUTER-ASSISTED NAVIGATION: QUYEN: RIGHT: MARTHA - TRIATHALON;  Surgeon: Milton Cobian III, MD;  Location: [facility] OR;  Service: Orthopedics;  Laterality: Right;    BREAST BIOPSY Left     Excisional bx, benign    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    CARPAL TUNNEL RELEASE Right 3/16/2021    Procedure: RELEASE, CARPAL TUNNEL;  Surgeon: Barbara Aldridge MD;  Location: [facility] OR;  Service: Orthopedics;  Laterality: Right;    CATARACT EXTRACTION W/  INTRAOCULAR LENS IMPLANT Bilateral     EPIDURAL STEROID INJECTION N/A 2/24/2023    Procedure:  HEIDI C7-T1;  Surgeon: Andi Cisneros DO;  Location: [facility] OR;  Service: Pain Management;  Laterality: N/A;    EYE SURGERY      HAND SURGERY      INJECTION OF ANESTHETIC AGENT AROUND MEDIAL BRANCH NERVES INNERVATING CERVICAL FACET JOINT N/A 1/4/2022    Procedure: Cervical Medial Branch Block C5-6, C6-7 (No Sedation);  Surgeon: Himanshu Blackmon Jr., MD;  Location: [facility] [place] [facility];  Service: Pain Management;  Laterality: N/A;    INJECTION OF ANESTHETIC AGENT AROUND MEDIAL BRANCH NERVES INNERVATING LUMBAR FACET JOINT Left 11/18/2021    Procedure: Cervical Medial Branch Block Left C5-6, C6-7 (IV Sedation);  Surgeon: Himanshu Blackmon Jr., MD;  Location: [facility] [place] [facility];  Service: Pain Management;  Laterality: Left;    JOINT REPLACEMENT Right     ELZBIETA    KNEE ARTHROPLASTY Left 8/10/2020    Procedure: ARTHROPLASTY, KNEE-ADE NEXGEN/CEMENTED TIBIA;  Surgeon: Kalin Pacheco MD;  Location: [facility] OR;  Service: Orthopedics;  Laterality: Left;    RADIOFREQUENCY THERMAL COAGULATION OF MEDIAL BRANCH OF POSTERIOR RAMUS OF CERVICAL SPINAL NERVE Left 3/8/2022    Procedure: RADIOFREQUENCY THERMAL COAGULATION, NERVE, SPINAL, CERVICAL, LEFT C5-6 AND C6-7;  Surgeon: Himanshu Blackmon Jr., MD;  Location: [facility] [place] [facility];  Service: Pain Management;  Laterality: Left;  NO PACEMAKER   ASA    thyriodectomy      partial    tonsillectomy      TONSILLECTOMY      TOTAL HIP ARTHROPLASTY      right    Yag  Left      Social History     Socioeconomic History    Marital status: Single   Occupational History     Employer: RETIRED   Tobacco Use    Smoking status: Never     Passive exposure: Never    Smokeless tobacco: Never   Substance and Sexual Activity    Alcohol use: Yes     Comment: socially - celebrations only    Drug use: Never    Sexual activity: Not Currently     Social Determinants of Health     Financial Resource Strain: Low Risk  (8/29/2023)    Overall Financial Resource Strain (CARDIA)     Difficulty of Paying Living Expenses: Not hard  at all   Food Insecurity: No Food Insecurity (8/29/2023)    Hunger Vital Sign     Worried About Running Out of Food in the Last Year: Never true     Ran Out of Food in the Last Year: Never true   Transportation Needs: No Transportation Needs (8/29/2023)    PRAPARE - Transportation     Lack of Transportation (Medical): No     Lack of Transportation (Non-Medical): No   Physical Activity: Insufficiently Active (8/29/2023)    Exercise Vital Sign     Days of Exercise per Week: 7 days     Minutes of Exercise per Session: 20 min   Stress: No Stress Concern Present (8/29/2023)    [employer] - Occupational Stress Questionnaire     Feeling of Stress : Only a little   Housing Stability: Low Risk  (8/29/2023)    Housing Stability Vital Sign     Unable to Pay for Housing in the Last Year: No     Number of Places Lived in the Last Year: 1     Unstable Housing in the Last Year: No     Family History   Problem Relation Name Age of Onset    Breast cancer Mother      Cancer Mother      Stroke Paternal Grandfather      Heart disease Father      Cancer Paternal Aunt      Heart disease Paternal Aunt      Glaucoma Paternal Grandmother      Amblyopia Neg Hx      Blindness Neg Hx      Cataracts Neg Hx      Diabetes Neg Hx      Hypertension Neg Hx      Macular degeneration Neg Hx      Retinal detachment Neg Hx      Strabismus Neg Hx      Thyroid disease Neg Hx      Melanoma Neg Hx         Review of patient's allergies indicates:   Allergen Reactions    Sulfa (sulfonamide antibiotics) Rash    Clarithromycin Other (See Comments)     Weak, extreme fatigue. dizziness    Flexeril [cyclobenzaprine] Other (See Comments)     Dizziness    Iodine and iodide containing products     Lisinopril Other (See Comments)     Cough and sensation of throat swelling/?angioedema    Losartan Rash    Metoprolol Swelling     Tightness in throat    Spironolactone      Throat tightening    Tramadol Other (See Comments)     Dizzy and weak     Verapamil (bulk) Palpitations    Voltaren [diclofenac sodium] Other (See Comments)     Drops blood pressure       Current Outpatient Medications   Medication Sig    acetaminophen (TYLENOL) 650 MG TbSR Take 1 tablet (650 mg total) by mouth every 8 (eight) hours.    aliskiren (TEKTURNA) 300 MG Tab TAKE 1 TABLET BY MOUTH EVERY DAY    aspirin (ECOTRIN) 81 MG EC tablet TAKE 1 TABLET BY MOUTH EVERY DAY    b complex vitamins tablet Take 1 tablet by mouth once daily.    coenzyme Q10 100 mg capsule Take 100 mg by mouth once daily.    ezetimibe (ZETIA) 10 mg tablet Take 1 tablet (10 mg total) by mouth once daily.    glucosamine-chondroitin 500-400 mg tablet Take 1 tablet by mouth once daily.     labetaloL (NORMODYNE) 100 MG tablet Take 150 mg by mouth 2 (two) times daily.    LORazepam (ATIVAN) 0.5 MG tablet TAKE 1/2 TABLET TO 1 TABLET BY MOUTH EVERY 12 HOURS AS NEEDED FOR ANXIETY    multivitamin capsule Take 1 capsule by mouth once daily.    omeprazole (PRILOSEC OTC) 20 MG tablet Take 20 mg by mouth once daily.    pravastatin (PRAVACHOL) 80 MG tablet Take 1 tablet (80 mg total) by mouth once daily.    psyllium husk, aspartame, (METAMUCIL) 3.4 gram PwPk packet Take 1 packet by mouth once daily.    TURMERIC ORAL Take 500 mg by mouth once daily.     vitamin D (VITAMIN D3) 1000 units Tab Take 1,000 Units by mouth once daily.     No current facility-administered medications for this visit.       REVIEW OF SYSTEMS:    GENERAL:  No weight loss, malaise or fevers.  HEENT:   No recent changes in vision or hearing  NECK:  Negative for lumps, no difficulty with swallowing.  RESPIRATORY:  Negative for cough, wheezing or shortness of breath, patient denies any recent URI.  CARDIOVASCULAR:  Negative for chest pain, leg swelling or palpitations. + leg swelling   GI:  Negative for abdominal discomfort, blood in stools or black stools or change in bowel habits.  MUSCULOSKELETAL:  See HPI.  SKIN:  Negative for lesions, rash, and itching. +  "ALL  PSYCH:  No mood disorder or recent psychosocial stressors.  Patients sleep is not disturbed secondary to pain.  HEMATOLOGY/LYMPHOLOGY:  Negative for prolonged bleeding, bruising easily or swollen nodes.  Patient is not currently taking any anti-coagulants  NEURO:   No history of headaches, syncope, paralysis, seizures or tremors.  All other reviewed and negative other than HPI.    OBJECTIVE:    Ht 5' 2" (1.575 m)   Wt 61.1 kg (134 lb 11.2 oz)   BMI 24.64 kg/m²     PHYSICAL EXAMINATION:    GENERAL: Well appearing, in no acute distress, alert and oriented x3.  PSYCH:  Mood and affect appropriate.  SKIN: Skin color, texture, turgor normal, no rashes or lesions.  HEAD/FACE:  Normocephalic, atraumatic. Cranial nerves grossly intact.    NECK:   - Positive pain to palpation over the cervical paraspinous muscles.   - Spurling  Positive.  - Negative pain with neck flexion, extension, or lateral flexion.     CV: RRR with palpation of the radial artery.  PULM: CTAB. No evidence of respiratory difficulty, symmetric chest rise.  GI:  Soft and non-tender.    MUSKULOSKELETAL:    Mild edema in both legs  Difficulty with right shoudler abduction and flexion. Cannot raise     NEURO: Bilateral upper and lower extremity coordination and muscle stretch reflexes are physiologic and symmetric.      NEUROLOGICAL EXAM:  MENTAL STATUS: A x O x 3, good concentration, speech is fluent and goal directed  MEMORY: recent and remote are intact  CN: CN2-12 grossly intact  MOTOR: 5/5 in all muscle groups. Right shoulder abduction 2/5, shoulder flexion 1/5.   DTRs: 2+ intact symmetric  Sensation:    -no Loss of sensation in a left upper and right upper C-4, C-5, C-6, C-7, and C-8 bilaterally distribution.  Hicks: absent on the bilateral side(s)  Clonus: absent on the bilateral side(s)    GAIT: normal.        "

## 2024-05-20 NOTE — PROGRESS NOTES
Chronic Pain - f/u    Referring Physician: No ref. provider found    Date: 05/20/2024     Re: Gerda Lai  MR#: [id number]  YOB: 1933  Age: 89 y.o.    Chief Complaint: neck pain  Chief Complaint   Patient presents with    Hand Pain    Neck Pain     **This note is dictated using the M*Modal Fluency Direct word recognition program. There are word recognition mistakes that are occasionally missed on review.**    ASSESSMENT: 89 y.o. year old female with neck and arm pain, consistent with     1. Cervical radiculopathy  Procedure Order to Pain Management          PLAN:     Cervical spinal stenosis and radiculopathy  -Prior records reviewed. Previous patient of Dr. Blackmon, then Rebeca Gregorio and now to me.  -imaging discussed with patient. Moderate/severe CS and FS at multiple levels. Has autofusion at C3-4.  -2/23/23 - s/p HEIDY on 2/23/23 w/ 80% @2m and 5m. 30-50% @ 11months  -discussed repeat HEIDI. She would like to schedule this.  6/10/24 - HEIDY C7-T1 (toward the left) - RN sed w/Versed and Benadryl - no AC  -completed acupuncture. Somewhat helpful    Right knee TKA  -s/p TKA 5/22/23 with great results.  She is walking better and feels better. Knee is not giving out anymore.    - RTC mid July  - Counseled patient regarding the importance of weight loss and activity modification and physical therapy.    The above plan and management options were discussed at length with patient. Patient is in agreement with the above and verbalized understanding. It will be communicated with the referring physician via electronic record, fax, or mail.  Lab/study reports reviewed were important and necessary because subsequent medical and treatment recommendations required review of the above lab/study reports. Images viewed/reviewed above were important and necessary because subsequent medical and treatment recommendations required review of the reviewed image(s).     Electronically signed by:  Andi Cisneros  "DO  05/20/2024    =========================================================================================================    SUBJECTIVE:    Interval History 5/20/2024:   Gerda Lai is a 89 y.o. female presents to the clinic for follow up.  Since last visit the pain has has worsened.  Patient reports that things have not gotten any better in the last few months.  If anything it is slightly worse. Patient uses Aspercreme which helps.    The pain is located in the neck and arm pain and radiates to the hands.  The pain is described as tingling and "stiffness"    At BEST  2/10   At WORST  6/10 on the WORST day.    On average pain is rated as 4/10.   Today the pain is rated as 4/10  Symptoms interfere with sleeping.   Exacerbating factors: activity.    Mitigating factors nothing and rest.     Current pain medications: Tylenol arthritis TID-QID. ASA 81.  Failed Pain Medications: Tylenol, tumeric    Pain procedures:   03/2022 - left cervical C5,6,7 RFA  02/24/23 - HEIDY C7-T1 - 80% x6 months. 30-50%@11m    Interval History 1/18/2024:   Gerda Lai is a 89 y.o. female presents to the clinic for follow up.  Since last visit the pain has has worsened.  The patient states that the pain is both worse and not worse.  She went to acupuncture which helped some. The pain tends to be the worst in the morning.     The pain is located in the neck area and .does not radiate  The pain is described as aching and stiff     At BEST  2/10   At WORST  6/10 on the WORST day.    On average pain is rated as 6/10.   Today the pain is rated as 6/10  Symptoms interfere with daily activity.   Exacerbating factors: Laying, Extension, and Getting out of bed/chair.    Mitigating factors medications.     Interval History 7/18/2023:   Gerda Lai is a 89 y.o. female presents to the clinic for follow up.  Since last visit the pain has has significantly improved. The patient is s/p knee surgery on 5/22/23 and that has gone very well.  She is " walking more and doing more.  She had acupuncture for the first treatment, but never got a call back for the 2nd one.     The pain is located in the neck area and radiates to the head .  The pain is described as throbbing and tingling    At BEST  0/10   At WORST  3/10 on the WORST day.    On average pain is rated as 2/10.   Today the pain is rated as 2/10  Symptoms interfere with daily activity.   Exacerbating factors: daily activities.    Mitigating factors massage, medications, and physical therapy.     Initial hx:  Gerda Lai is a 89 y.o. female presents to the clinic for the evaluation of neck pain. The pain started 1 month ago following surgery on neck  and symptoms have been worsening. Patient was seeing Rebeca Gregorio and transferring care to me. She is s/p HEIDY at C7-T1 on 2/20/23 which has been helpful for the pain in the neck and arm.  She continues to have tingling but improvement in the pain.    She is having some other issues especially in the legs which she is being worked up with her PCP/cardiology. She has an appointment with cardiology to evaluate.    Pain Description:    The pain is located in the neck area and radiates to the upper head/ back of the head .    At BEST  1/10   At WORST  4/10 on the WORST day.    On average pain is rated as 1/10.   Today the pain is rated as 1/10  The pain is continuous.  The pain is described as tingling    Symptoms interfere with daily activity and sleeping.   Exacerbating factors: daily activity.    Mitigating factors nothing and rest.   She reports 8 hours of sleep per night.    Physical Therapy/Home Exercise: Yes, currently has a home exercise program. She exercises everyday in the morning.     Current Pain Medications:    - Tylenol arthritis TID-QID. ASA 81.    Failed Pain Medications:    - tylenol, tumeric    Pain Treatment Therapies:    Physical Therapy: does home exercise programs  Chiropractor: none  Acupuncture: none  TENS unit: none  Spinal  decompression: none  Joint replacement: hip and knee replacement    Patient denies urinary incontinence, bowel incontinence, and loss of sensations.  Patient denies any suicidal or homicidal ideations     report:  Reviewed and consistent with medication use as prescribed.    Imaging:   MRI Cervical 01/2023:  FINDINGS:  Straightening of the cervical lordosis.  Grade 1 retrolisthesis of C4-C5.  Grade 1 anterolisthesis of C6-C7, C7-T1 and T1-T2.  Degenerative endplate changes throughout the cervical spine.  No fracture or marrow infiltrative process.  Severe disc height loss at C3 through C6.  Suspected fusion of the C3-C4 disc space.  Osseous fusion of the left C2-C3 and C3-C4 facet joints.     Pre dens space is maintained.  Dens is intact.  There is thickening of the transverse ligament with moderate narrowing of the spinal canal between the transverse ligament and posterior arch of C1.     Cervical spinal cord demonstrates normal signal intensity.  Cerebellar tonsils are in their expected location.  Partially visualized posterior fossa is unremarkable.     Vertebral artery flow voids are present.  Paraspinal musculature demonstrates normal bulk and signal intensity.  Note made of cystic right thyroid nodule.     C2-C3: Uncovertebral spurring and severe facet arthropathy result in moderate left neural foraminal narrowing.     C3-C4: Posterior disc osteophyte complex with uncovertebral spurring and severe facet arthropathy result in severe left, moderate right neural foraminal narrowing.     C4-C5: Posterior disc osteophyte complex with uncovertebral spurring and severe facet arthropathy result in moderate spinal canal stenosis and severe bilateral neural foraminal narrowing.     C5-C6: Posterior disc osteophyte complex with uncovertebral spurring and severe facet arthropathy result in mild spinal canal stenosis and severe right neural foraminal narrowing.     C6-C7: Posterior disc osteophyte complex and moderate  facet arthropathy noted.  No spinal canal stenosis or neural foraminal narrowing.     C7-T1: Moderate facet arthropathy noted.  No spinal canal stenosis or neural foraminal narrowing.     Impression:     1. Multilevel degenerative changes of the cervical spine detailed above.  Moderate spinal canal stenosis noted at the level of C1 and C4-C5.  Moderate to severe neural foraminal narrowing noted at C2-C6.  2. Fusion across the C3-C4 disc space and left-sided C2-C4 facet joints.    Past Medical History:   Diagnosis Date    Anxiety     Arthritis     Basal cell carcinoma 09/2016    right post auricular neck     Basal cell carcinoma of right forearm 04/19/2023    R lower forearm    Breast cancer 1992    right    Cataract     Fibromyalgia     History of measles as a child     Hyperlipidemia     Hypertension     Personal history of colonic polyps     Pneumonia     SCC (squamous cell carcinoma) 2015    R chest    SCC (squamous cell carcinoma) 2016    left medial shoulder    SCC (squamous cell carcinoma) 2017    left knee    SCC (squamous cell carcinoma) 2022    L upper chest, R upper arm KA types    Shingles     Squamous cell carcinoma 2015    right forearm    Thyroid disease     Vaginitis      Past Surgical History:   Procedure Laterality Date    ADENOIDECTOMY      ARTHROPLASTY, KNEE, TOTAL, USING COMPUTER-ASSISTED NAVIGATION Right 5/22/2023    Procedure: ARTHROPLASTY, KNEE, TOTAL, USING COMPUTER-ASSISTED NAVIGATION: QUYEN: RIGHT: MARTHA - TRIATHALON;  Surgeon: Milton Cobian III, MD;  Location: [facility] OR;  Service: Orthopedics;  Laterality: Right;    BREAST BIOPSY Left     Excisional bx, benign    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    CARPAL TUNNEL RELEASE Right 3/16/2021    Procedure: RELEASE, CARPAL TUNNEL;  Surgeon: Barbara Aldridge MD;  Location: [facility] OR;  Service: Orthopedics;  Laterality: Right;    CATARACT EXTRACTION W/  INTRAOCULAR LENS IMPLANT Bilateral     EPIDURAL STEROID INJECTION N/A 2/24/2023    Procedure:  HEIDI C7-T1;  Surgeon: Andi Cisneros DO;  Location: [facility] OR;  Service: Pain Management;  Laterality: N/A;    EYE SURGERY      HAND SURGERY      INJECTION OF ANESTHETIC AGENT AROUND MEDIAL BRANCH NERVES INNERVATING CERVICAL FACET JOINT N/A 1/4/2022    Procedure: Cervical Medial Branch Block C5-6, C6-7 (No Sedation);  Surgeon: Himanshu Blackmon Jr., MD;  Location: [facility] PAIN MGT;  Service: Pain Management;  Laterality: N/A;    INJECTION OF ANESTHETIC AGENT AROUND MEDIAL BRANCH NERVES INNERVATING LUMBAR FACET JOINT Left 11/18/2021    Procedure: Cervical Medial Branch Block Left C5-6, C6-7 (IV Sedation);  Surgeon: Himanshu Blackmon Jr., MD;  Location: [facility] PAIN [facility]GT;  Service: Pain Management;  Laterality: Left;    JOINT REPLACEMENT Right     ELZBIETA    KNEE ARTHROPLASTY Left 8/10/2020    Procedure: ARTHROPLASTY, KNEE-ADE NEXGEN/CEMENTED TIBIA;  Surgeon: Kalin Pacheco MD;  Location: [facility] OR;  Service: Orthopedics;  Laterality: Left;    RADIOFREQUENCY THERMAL COAGULATION OF MEDIAL BRANCH OF POSTERIOR RAMUS OF CERVICAL SPINAL NERVE Left 3/8/2022    Procedure: RADIOFREQUENCY THERMAL COAGULATION, NERVE, SPINAL, CERVICAL, LEFT C5-6 AND C6-7;  Surgeon: Himanshu Blackmon Jr., MD;  Location: [facility] PAIN [facility]GT;  Service: Pain Management;  Laterality: Left;  NO PACEMAKER   ASA    thyriodectomy      partial    tonsillectomy      TONSILLECTOMY      TOTAL HIP ARTHROPLASTY      right    Yag  Left      Social History     Socioeconomic History    Marital status: Single   Occupational History     Employer: RETIRED   Tobacco Use    Smoking status: Never     Passive exposure: Never    Smokeless tobacco: Never   Substance and Sexual Activity    Alcohol use: Yes     Comment: socially - celebrations only    Drug use: Never    Sexual activity: Not Currently     Social Determinants of Health     Financial Resource Strain: Low Risk  (8/29/2023)    Overall Financial Resource Strain (CARDIA)     Difficulty of Paying Living Expenses: Not hard  at all   Food Insecurity: No Food Insecurity (8/29/2023)    Hunger Vital Sign     Worried About Running Out of Food in the Last Year: Never true     Ran Out of Food in the Last Year: Never true   Transportation Needs: No Transportation Needs (8/29/2023)    PRAPARE - Transportation     Lack of Transportation (Medical): No     Lack of Transportation (Non-Medical): No   Physical Activity: Insufficiently Active (8/29/2023)    Exercise Vital Sign     Days of Exercise per Week: 7 days     Minutes of Exercise per Session: 20 min   Stress: No Stress Concern Present (8/29/2023)    [employer] [place] [employer] - Occupational Stress Questionnaire     Feeling of Stress : Only a little   Housing Stability: Low Risk  (8/29/2023)    Housing Stability Vital Sign     Unable to Pay for Housing in the Last Year: No     Number of Places Lived in the Last Year: 1     Unstable Housing in the Last Year: No     Family History   Problem Relation Name Age of Onset    Breast cancer Mother      Cancer Mother      Stroke Paternal Grandfather      Heart disease Father      Cancer Paternal Aunt      Heart disease Paternal Aunt      Glaucoma Paternal Grandmother      Amblyopia Neg Hx      Blindness Neg Hx      Cataracts Neg Hx      Diabetes Neg Hx      Hypertension Neg Hx      Macular degeneration Neg Hx      Retinal detachment Neg Hx      Strabismus Neg Hx      Thyroid disease Neg Hx      Melanoma Neg Hx         Review of patient's allergies indicates:   Allergen Reactions    Sulfa (sulfonamide antibiotics) Rash    Clarithromycin Other (See Comments)     Weak, extreme fatigue. dizziness    Flexeril [cyclobenzaprine] Other (See Comments)     Dizziness    Iodine and iodide containing products     Lisinopril Other (See Comments)     Cough and sensation of throat swelling/?angioedema    Losartan Rash    Metoprolol Swelling     Tightness in throat    Spironolactone      Throat tightening    Tramadol Other (See Comments)     Dizzy and weak     Verapamil (bulk) Palpitations    Voltaren [diclofenac sodium] Other (See Comments)     Drops blood pressure       Current Outpatient Medications   Medication Sig    acetaminophen (TYLENOL) 650 MG TbSR Take 1 tablet (650 mg total) by mouth every 8 (eight) hours.    aliskiren (TEKTURNA) 300 MG Tab TAKE 1 TABLET BY MOUTH EVERY DAY    aspirin (ECOTRIN) 81 MG EC tablet TAKE 1 TABLET BY MOUTH EVERY DAY    b complex vitamins tablet Take 1 tablet by mouth once daily.    coenzyme Q10 100 mg capsule Take 100 mg by mouth once daily.    ezetimibe (ZETIA) 10 mg tablet Take 1 tablet (10 mg total) by mouth once daily.    glucosamine-chondroitin 500-400 mg tablet Take 1 tablet by mouth once daily.     labetaloL (NORMODYNE) 100 MG tablet Take 150 mg by mouth 2 (two) times daily.    LORazepam (ATIVAN) 0.5 MG tablet TAKE 1/2 TABLET TO 1 TABLET BY MOUTH EVERY 12 HOURS AS NEEDED FOR ANXIETY    multivitamin capsule Take 1 capsule by mouth once daily.    omeprazole (PRILOSEC OTC) 20 MG tablet Take 20 mg by mouth once daily.    pravastatin (PRAVACHOL) 80 MG tablet Take 1 tablet (80 mg total) by mouth once daily.    psyllium husk, aspartame, (METAMUCIL) 3.4 gram PwPk packet Take 1 packet by mouth once daily.    TURMERIC ORAL Take 500 mg by mouth once daily.     vitamin D (VITAMIN D3) 1000 units Tab Take 1,000 Units by mouth once daily.     No current facility-administered medications for this visit.       REVIEW OF SYSTEMS:    GENERAL:  No weight loss, malaise or fevers.  HEENT:   No recent changes in vision or hearing  NECK:  Negative for lumps, no difficulty with swallowing.  RESPIRATORY:  Negative for cough, wheezing or shortness of breath, patient denies any recent URI.  CARDIOVASCULAR:  Negative for chest pain, leg swelling or palpitations. + leg swelling   GI:  Negative for abdominal discomfort, blood in stools or black stools or change in bowel habits.  MUSCULOSKELETAL:  See HPI.  SKIN:  Negative for lesions, rash, and itching. +  "ALL  PSYCH:  No mood disorder or recent psychosocial stressors.  Patients sleep is not disturbed secondary to pain.  HEMATOLOGY/LYMPHOLOGY:  Negative for prolonged bleeding, bruising easily or swollen nodes.  Patient is not currently taking any anti-coagulants  NEURO:   No history of headaches, syncope, paralysis, seizures or tremors.  All other reviewed and negative other than HPI.    OBJECTIVE:    Ht 5' 2" (1.575 m)   Wt 61.1 kg (134 lb 11.2 oz)   BMI 24.64 kg/m²     PHYSICAL EXAMINATION:    GENERAL: Well appearing, in no acute distress, alert and oriented x3.  PSYCH:  Mood and affect appropriate.  SKIN: Skin color, texture, turgor normal, no rashes or lesions.  HEAD/FACE:  Normocephalic, atraumatic. Cranial nerves grossly intact.    NECK:   - Positive pain to palpation over the cervical paraspinous muscles.   - Spurling  Positive.  - Negative pain with neck flexion, extension, or lateral flexion.     CV: RRR with palpation of the radial artery.  PULM: CTAB. No evidence of respiratory difficulty, symmetric chest rise.  GI:  Soft and non-tender.    MUSKULOSKELETAL:    Mild edema in both legs  Difficulty with right shoudler abduction and flexion. Cannot raise     NEURO: Bilateral upper and lower extremity coordination and muscle stretch reflexes are physiologic and symmetric.      NEUROLOGICAL EXAM:  MENTAL STATUS: A x O x 3, good concentration, speech is fluent and goal directed  MEMORY: recent and remote are intact  CN: CN2-12 grossly intact  MOTOR: 5/5 in all muscle groups. Right shoulder abduction 2/5, shoulder flexion 1/5.   DTRs: 2+ intact symmetric  Sensation:    -no Loss of sensation in a left upper and right upper C-4, C-5, C-6, C-7, and C-8 bilaterally distribution.  Hicks: absent on the bilateral side(s)  Clonus: absent on the bilateral side(s)    GAIT: normal.        "

## 2024-05-20 NOTE — TELEPHONE ENCOUNTER
----- Message from Andi Seaman DO sent at 2024  2:48 PM CDT -----  Regarding: Order for JOSÉ MIGUEL CASTILLO    Patient Name: JOSÉ MIGUEL CASTILLO([id number])  Sex: Female  : 1933      PCP: DARYL OLIVIER    Center: [facility] CENTRAL BILLING OFFICE     Types of orders made on 2024: Procedure Request    Order Date:2024  Ordering User:ANDI SEAMAN [[id number]]  Encounter Provider:Andi Seaman DO [[id number]]  Authorizing   Provider: Andi Seaman DO [[id number]]  Department:[facility][[id number]]    Common Order Information  Procedure -> Epidural Injection (specify level) Cmt: C7-T1 HEIDI    Pre-op Diagnosis -> Cervical radiculopathy     Order Specific Information  Order: Procedure Order to Pain Management [Custom: [id number]]  Order #:          [id number]: 1 FUTURE    Priority: Routine  Class: Clinic Performed    Z1   Future Order Information      Expires on:2025            Expected by:2024                   Comment:6/10/24 - HEIDY C7-T1 (toward the left) - RN sed w/Versed and Benadryl             - no AC    Associated Diagnoses      M54.12 Cervical radiculopathy      Physician -> werneryu         Is patient on anti-coagulants? -> Yes Cmt: stop ASA        Facility Name: -> [facility]           Priority: Routine  Class:   [facility] Performed    Future Order Information      Expires on:2025            Expected by:2024                   Comment:6/10/24 - HEIDY C7-T1 (toward the left) - RN sed w/Versed and Benadryl             - no AC    Associated Diagnoses      M54.12 Cervical radiculopathy      Procedure -> Epidural Injection (specify level) Cmt: C7-T1 HEIDI        Physician -> werneryu         Is patient on anti-coagulants? -> Yes Cm  t: stop ASA        Pre-op Diagnosis -> Cervical radiculopathy         Facility Name: -> [facility]

## 2024-05-31 ENCOUNTER — TELEPHONE (OUTPATIENT)
Dept: CARDIOLOGY | Facility: CLINIC | Age: 89
End: 2024-05-31
Payer: MEDICARE

## 2024-05-31 NOTE — TELEPHONE ENCOUNTER
----- Message from Kristie Villela sent at 5/31/2024 11:15 AM CDT -----  Regarding: Clearance request to stop ASA  Good morning,   We have scheduled Gerda Lai for a C7-T1 HEIDI on 6/10 with Dr Buenrostro and he is requesting clearance for the patient to stop her ASA 5 days prior to this injection.       Thank you in advance   Kristie

## 2024-06-03 ENCOUNTER — TELEPHONE (OUTPATIENT)
Dept: PAIN MEDICINE | Facility: CLINIC | Age: 89
End: 2024-06-03
Payer: MEDICARE

## 2024-06-06 ENCOUNTER — TELEPHONE (OUTPATIENT)
Dept: PAIN MEDICINE | Facility: CLINIC | Age: 89
End: 2024-06-06
Payer: MEDICARE

## 2024-06-06 NOTE — TELEPHONE ENCOUNTER
----- Message from Maria Guadalupefavian Sanders sent at 6/6/2024  1:09 PM CDT -----  Regarding: Pt called states she wld like to speak with someone to see if cream shes applying to her toe is a antibiotic  Contact: [phone]  Name of Who is Calling:JOSÉ MIGUEL CASTILLO [[id number]]        What is the request in detail:Pt called states she wld like to speak with someone to see if cream shes applying to her toe is a antibiotic. States with Nurse on this morning and forgot about the toe cream. Please advise         Can the clinic reply by [facility]:No        What Number to Call Back if not in [facility]: Telephone Information:  Mobile          [phone]

## 2024-06-06 NOTE — PRE-PROCEDURE INSTRUCTIONS
Patient reviewed on 6/5/2024.  Okay to proceed at [place]. The following pre-procedure instructions and arrival time have been reviewed with patient via phone and sent to patient portal for review.  Patient verbalized an understanding.  Pt to be accompanied by rakel day of procedure as responsible .    Dear Gerda ,     You are scheduled for a procedure with Dr. Andi Cisneros on 6/10/2024.       Your scheduled arrival time is 06:00 am.  This arrival time is roughly 1 hour before your anticipated procedure time to allow sufficient time for pre-op..       You will need to change into a gown for this procedure.  This procedure will take place at the [facility] at the corner of [facility] and [facility].  It is in the [place] [place] next to [facility].  The address is:     [address] [place] [address].  BRIJ[place] [id number]     After entering the building, you will proceed to the second floor where you can check in with registration. You should take any medications that you routinely take for blood pressure, heart medications, thyroid, cholesterol, etc.      The fasting restrictions are dependent on whether or not you are receiving sedation.  Sedation is not available for all procedures.      Your fasting instructions are as follow:  IV sedation. You should not eat for 8 hours and can only drink clear liquids (water or black coffee without cream/sugar) up until 2 hours before your scheduled time.  You CANNOT drive yourself and must have a .     If you are on blood thinners, you need to follow the anticoagulation instructions that had been discussed previously.  You should only stop the blood thinners if it was approved by your primary care physician or your cardiologist.  In the event that you are not able to stop your blood thinners, a blood thinner was not listed on your medication list, or we were not able to get clearance from your cardiologist, then the procedure  may have to be postponed/canceled.      IF you were told to stop your blood thinners, this is how long you should generally hold some of the more common ones.  Remember that stopping blood thinners is only necessary for certain procedures. If you are unsure of your instructions, please call us.   Aspirin - 5 days  Plavix/Clopidogrel - 7 days  Warfarin / Coumadin - 5 days  Eliquis - 3 days  Pradaxa/Dabigatran - 4 days  Xarelto/Rivaroxaban - 3 days     If you are a diabetic, do not take your medication if you will be fasting, but bring it with you. Please plan on being here for roughly 2-3 hours.     Please call us if you have been sick (running fever, having any flu-like symptoms) or have been taking antibiotics in the past 2 weeks or had any outpatient procedures other than with us (colonoscopy, endoscopy, OBGYN, dental, etc.). If you have been previously COVID positive, you will need to hold off on your procedure until you are symptom free for 10 days. If you did not have any symptoms, you can have your procedure 10 days from your positive test result.       *HOLD ALL VITAMINS, MINERALS, HERBS (INCLUDING HERBAL TEAS) AND SUPPLEMENTS  *SHOWER WITH ANTIBACTERIAL SOAP (EX. DIAL) NIGHT BEFORE AND MORNING OF PROCEDURE  *DO NOT APPLY ANY LOTIONS, OILS, POWDERS, PERFUME/COLOGNE, OINTMENTS, GELS, CREAMS, MAKEUP OR DEODORANT TO YOUR SKIN MORNING OF PROCEDURE  *LEAVE JEWELRY AND ANY VALUABLES AT HOME  *WEAR LOOSE COMFORTABLE CLOTHING (PREFERABLY A BUTTON UP SHIRT)     Please reply to this message as receipt of delivery        Thank you,  [facility]  [place], LPN [facility]  Pre-Admit

## 2024-06-10 ENCOUNTER — HOSPITAL ENCOUNTER (OUTPATIENT)
Facility: HOSPITAL | Age: 89
Discharge: HOME | End: 2024-06-10
Attending: STUDENT IN AN ORGANIZED HEALTH CARE EDUCATION/TRAINING PROGRAM | Admitting: STUDENT IN AN ORGANIZED HEALTH CARE EDUCATION/TRAINING PROGRAM
Payer: MEDICARE

## 2024-06-10 VITALS
HEART RATE: 58 BPM | RESPIRATION RATE: 16 BRPM | SYSTOLIC BLOOD PRESSURE: 218 MMHG | OXYGEN SATURATION: 96 % | HEIGHT: 63 IN | TEMPERATURE: 98 F | BODY MASS INDEX: 24.27 KG/M2 | WEIGHT: 137 LBS | DIASTOLIC BLOOD PRESSURE: 93 MMHG

## 2024-06-10 DIAGNOSIS — M54.12 CERVICAL RADICULOPATHY: Primary | ICD-10-CM

## 2024-06-10 PROCEDURE — 25000003 PHARM REV CODE 250: Performed by: STUDENT IN AN ORGANIZED HEALTH CARE EDUCATION/TRAINING PROGRAM

## 2024-06-10 PROCEDURE — 63600175 PHARM REV CODE 636 W HCPCS: Performed by: STUDENT IN AN ORGANIZED HEALTH CARE EDUCATION/TRAINING PROGRAM

## 2024-06-10 PROCEDURE — 62321 NJX INTERLAMINAR CRV/THRC: CPT | Mod: ,,, | Performed by: STUDENT IN AN ORGANIZED HEALTH CARE EDUCATION/TRAINING PROGRAM

## 2024-06-10 PROCEDURE — 25500020 PHARM REV CODE 255: Performed by: STUDENT IN AN ORGANIZED HEALTH CARE EDUCATION/TRAINING PROGRAM

## 2024-06-10 PROCEDURE — 62321 NJX INTERLAMINAR CRV/THRC: CPT | Performed by: STUDENT IN AN ORGANIZED HEALTH CARE EDUCATION/TRAINING PROGRAM

## 2024-06-10 RX ORDER — DIPHENHYDRAMINE HYDROCHLORIDE 50 MG/ML
25 INJECTION INTRAMUSCULAR; INTRAVENOUS
Status: COMPLETED | OUTPATIENT
Start: 2024-06-10 | End: 2024-06-10

## 2024-06-10 RX ORDER — DEXAMETHASONE SODIUM PHOSPHATE 10 MG/ML
INJECTION INTRAMUSCULAR; INTRAVENOUS
Status: DISCONTINUED | OUTPATIENT
Start: 2024-06-10 | End: 2024-06-10 | Stop reason: HOSPADM

## 2024-06-10 RX ORDER — LIDOCAINE HYDROCHLORIDE 20 MG/ML
INJECTION, SOLUTION EPIDURAL; INFILTRATION; INTRACAUDAL; PERINEURAL
Status: DISCONTINUED | OUTPATIENT
Start: 2024-06-10 | End: 2024-06-10 | Stop reason: HOSPADM

## 2024-06-10 RX ORDER — MIDAZOLAM HYDROCHLORIDE 1 MG/ML
INJECTION INTRAMUSCULAR; INTRAVENOUS
Status: DISCONTINUED | OUTPATIENT
Start: 2024-06-10 | End: 2024-06-10 | Stop reason: HOSPADM

## 2024-06-10 RX ORDER — SODIUM CHLORIDE 9 MG/ML
500 INJECTION, SOLUTION INTRAVENOUS CONTINUOUS
Status: DISCONTINUED | OUTPATIENT
Start: 2024-06-10 | End: 2024-06-10 | Stop reason: HOSPADM

## 2024-06-10 RX ADMIN — DIPHENHYDRAMINE HYDROCHLORIDE 25 MG: 50 INJECTION INTRAMUSCULAR; INTRAVENOUS at 07:06

## 2024-06-10 NOTE — DISCHARGE INSTRUCTIONS
[facility]  [employer] service # [phone]    POST-PROCEDURE INSTRUCTIONS:    Today you had an injection that included a steroid medications.  The steroid may or may not have been mixed with a local anesthetic when it was injected.   If the injection was in the neck, you may feel some pressure, numbness, or slight weakness in the arm after the procedure for a short period of time (this is a normal response), if this persists for longer than 1 day please contact our office or go to the emergency room.  If the injection was in the low back, you may feel some pressure, numbness, or slight weakness in the leg after the procedure for a short period of time (this is a normal response), if this persists for longer than 1 day please contact our office or go to the emergency room.  You may get side effects from the steroid.  This is not uncommon.  Symptoms include: elevated blood sugar, elevated blood pressure, headache, flushing, nausea, insomnia.  These symptoms are transient and will resolve within 1-3 days.  If symptoms last longer than this please contact our office or head to the emergency room.  Steroid medications can take anywhere from 3-14 days to take effect (rarely longer).  You may notice that your pain worsens for a short period of time after the injection, this would not be unusual due to the pressure and trauma from the needle.    If you do not have a follow up appointment scheduled, please contact my office (or the office of the physician who referred you for the procedure) to get a post-procedure follow up scheduled 2-4 weeks after the procedure.  This can be done as a virtual visit if that is more convenient for you.      What you need to do:    Keep a record of your response to the injection you had today.    How much relief did you get?   When did the relief start and how long did it last?  Were you able to decrease the use of any of your pain  medications?  Were you able to increase your level of activity?  How long did the relief last?    What to watch out for:    If you experience any of the following symptoms after your procedure, please notify the messaging service immediately (see above for contact information):   fever (increased oral temperature)   bleeding or swelling at the injection site,    drainage, rash or redness at the injection site    possible signs of infection    increased pain at the injection site   worsening of your usual pain   severe headache   new or worsening numbness    new arm and/or leg weakness, or    changes in bowel and/or bladder function: urinating or defecating on yourself and not knowing that you did it.    PLEASE FOLLOW ALL INSTRUCTIONS CAREFULLY     Do not engage in strenuous activity (e.g., lifting or pushing heavy objects or repeated bending) for 24 hours.     Do not take a bath, swim or use Jacuzzi for 24 hours after procedure. (A shower is fine).   Remove any Band-Aids when you get home.    Use cold/ice, as needed for comfort.  We recommend the use of cold therapy alternating on for 20 minutes, off for 20 minutes.    Do not apply direct heat (heating pad or heat packs) to the injection site for 24 hours.     Resume your usual medications, unless instructed otherwise by your Pain Physician.     If you are on warfarin (Coumadin) or other blood thinner, resume this medication as instructed by your prescribing Physician.    IF AT ANY POINT YOU ARE VERY CONCERNED ABOUT YOUR SYMPTOMS, PLEASE GO TO THE EMERGENCY ROOM.    If you develop worsening pain, weakness, numbness, lose bowel or bladder control (i.e., having an accident where you did not even know you had to go to the bathroom and suddenly noticed you soiled yourself), saddle anesthesia (a loss of sensation restricted to the area of the buttocks, anus and between the legs -- i.e., those parts of your body that would touch a saddle if you were sitting on one) you  need to go immediately to the emergency department for evaluation and treatment.    ----------------------------------------------------------------------------------------------------------------------------------------------------------------  If you received Sedation please read the following instructions:  POST SEDATION INSTRUCTIONS    Today you received intravenous medication (also known as sedation) that was used to help you relax and/or decrease discomfort during your procedure. This medication will be acting in your body for the next 24 hours, so you might feel a little tired or sleepy. This feeling will slowly wear off.   Common side effects associated with these medications include: drowsiness, dizziness, sleepiness, confusion, feeling excited, difficulty remembering things, lack of steadiness with walking or balance, loss of fine muscle control, slowed reflexes, difficulty focusing, and blurred vision.  Some over-the-counter and prescription medications (e.g., muscle relaxants, opioids, mood-altering medications, sedatives/hypnotics, antihistamines) can interact with the intravenous medication you received and cause an increased risk of the side effects listed above in addition to other potentially life threatening side effects. Use extreme caution if you are taking such medications, and consult with your Pain Physician or prescribing physician if you have any questions.  For the next 12-24 hours:    DO NOT--Drive a car, operate machinery or power tools   DO NOT--Drink any alcoholic beverages (not even beer), they may dangerously increase the risk of side effects.    DO NOT--Make any important legal or business decisions or sign important documents.  We advise you to have someone to assist you at home. Move slowly and carefully. Do not make sudden changes in position. Be aware of dizziness or light-headedness and move accordingly.   If you seek medical treatment within 24 hours, let the nurse or doctor  caring for you know that you have received the above medications. If you have any questions or concerns related to your sedation or treatment today please contact us.

## 2024-06-10 NOTE — OP NOTE
"PROCEDURE:  CERVICAL C7-T1 INTERLAMINAR EPIDURAL STEROID INJECTION    Patient Name: Gerda Lai  MRN: [id number]  DATE OF PROCEDURE: 06/10/2024    DIAGNOSIS: Cervical Radiculopathy  CPT CODE: 01982      POSTPROCEDURE DIAGNOSIS: Same    PHYSICIAN: Andi Cisneros DO  NEEDLE TYPE: - 20G 3.5" Touhy Needle  LOCAL ANESTHETIC INJECTED: Xylocaine 2%   MEDICATIONS INJECTED: 4cc mixture of 3cc Normal Saline + 10mg Dexamethasone (10mg/ml)  CONTRAST: Omni 300  LOSS OF RESISTANCE DEPTH: 4 cm    Sedation Medications - Mild Sedation with 1md Versed and 50mcg Fentanyl.    Conscious sedation ordered by M.D. Patient re-evaluation prior to administration of conscious sedation. No changes noted in patient's status from initial evaluation. The patient's vital signs were monitored by RN and patient remained hemodynamically stable throughout the procedure.    Event Time In   Sedation Start 0741   Sedation End 0746       Estimated Blood Loss - <2ml  Drains: None  Specimens Removed: None  Urine Output - Not Measured  Complications: None  Outcome: Good    Informed Consent:  The patient's condition and proposed procedures, risks, and alternatives were discussed with the patient or responsible party.  The patient's / responsible party's questions were answered.   The patient / responsible party appeared to understand and chose to proceed.  Informed consent was obtained.  After obtaining written consent, an IV hep lock was placed. (See nurses notes for details).     Procedure in Detail:  The patient was taken back to the OR suite and placed in a prone position. The skin overlying the injection site was prepped and draped in an aseptic fashion. The target injection site (see above) was identified with fluoroscopy and marked.     Procedural Pause:  A procedural pause verifying correct patient, medical record number, allergies, medications to be administered, current vital signs, and surgical site was performed immediately prior to " beginning the procedure.    With the patient laying in a prone position, the surgical area was prepped and draped in the usual sterile fashion using ChloraPrep and a fenestrated drape. The level was determined under fluoroscopy guidance. Skin anesthesia was achieved by injecting Lidocaine 2% over the injection site.  The interlaminar space was then approached with a 20 gauge, 3.5 inch Tuohy needle that was introduced under fluoroscopic guidance with AP, lateral and/or contralateral oblique imaging. Once the Ligamentum flavum was encountered loss of resistance to saline was used to enter the epidural space. With positive loss of resistance and negative aspiration for CSF or Blood, contrast dye  Omnipaque (300mg/mL) was injected to confirm placement and there was no vascular runoff. Then 4 mL of the medication mixture listed above was then injected slowly. Displacement of the radio opaque contrast after injection of the medication confirmed that the medication went into the area of the epidural space. The needles were removed, and bleeding was nil. A sterile dressing was applied. No specimens collected. The patient tolerated the procedure well.     The heart rate, pulse oximetry, and blood pressure were continuously monitored throughout the procedure.  The procedure was well tolerated. She was carefully escorted to the recovery room in stable condition. Patient was monitored by RN for recovery period.  The patient will be contacted in the next few days to determine extent of relief.  Patient was given post procedure and discharge instructions to follow at home.  The patient was discharged in a stable condition.    Note Electronically Signed By:  Andi Wilson

## 2024-06-10 NOTE — DISCHARGE SUMMARY
[facility]  Discharge Note  Short Stay    Procedure(s) (LRB):  C7-T1 HEIDI (toward the left) (N/A)      OUTCOME: Patient tolerated treatment/procedure well without complication and is now ready for discharge.    DISPOSITION: Home or Self Care    FINAL DIAGNOSIS:  <principal problem not specified>    FOLLOWUP: In clinic    DISCHARGE INSTRUCTIONS:  No discharge procedures on file.     TIME SPENT ON DISCHARGE: 10 minutes

## 2024-06-17 DIAGNOSIS — I10 HYPERTENSION, ESSENTIAL: Chronic | ICD-10-CM

## 2024-06-17 RX ORDER — LORAZEPAM 0.5 MG/1
TABLET ORAL
Qty: 30 TABLET | Refills: 0 | Status: SHIPPED | OUTPATIENT
Start: 2024-06-17

## 2024-06-17 RX ORDER — ASPIRIN 81 MG/1
TABLET ORAL
Qty: 90 TABLET | Refills: 3 | Status: SHIPPED | OUTPATIENT
Start: 2024-06-17

## 2024-06-17 NOTE — TELEPHONE ENCOUNTER
No care due was identified.  [facility] Embedded Care Due Messages. Reference number: [id number].   6/17/2024 1:31:52 PM CDT

## 2024-06-21 ENCOUNTER — OFFICE VISIT (OUTPATIENT)
Dept: OPTOMETRY | Facility: CLINIC | Age: 89
End: 2024-06-21
Payer: MEDICARE

## 2024-06-21 DIAGNOSIS — H04.123 DRY EYES, BILATERAL: Primary | ICD-10-CM

## 2024-06-21 DIAGNOSIS — Z13.5 SCREENING FOR GLAUCOMA: ICD-10-CM

## 2024-06-21 DIAGNOSIS — H52.4 PRESBYOPIA: ICD-10-CM

## 2024-06-21 PROCEDURE — 99999 PR PBB SHADOW E&M-EST. PATIENT-LVL III: CPT | Mod: PBBFAC,,, | Performed by: OPTOMETRIST

## 2024-06-21 PROCEDURE — 99213 OFFICE O/P EST LOW 20 MIN: CPT | Mod: PBBFAC,PO | Performed by: OPTOMETRIST

## 2024-06-21 NOTE — PROGRESS NOTES
HPI    89 Y/o female is here for routine eye exam with C/o pt states on last   night a bug went into OD and she rinsed OD with water and then afterwards   she used and At's to rinse out OD, pt also say's she notices a decrease in   vision.  Pt denies pain and discomfort   No f/f    Eye med: Systane OU TID   Last edited by Viviana Shepard MA on 6/21/2024 12:51 PM.            Assessment /Plan     For exam results, see Encounter Report.    Dry eyes, bilateral    Screening for glaucoma    Presbyopia      1. Mild pco OD sp pciol ou/YAG OS--pt wishes new Rx  2. DAMION/rosacea hx. Pt to cont w ATs      PLAN:    Rtc 1 yr

## 2024-06-26 ENCOUNTER — PATIENT MESSAGE (OUTPATIENT)
Dept: OPTOMETRY | Facility: CLINIC | Age: 89
End: 2024-06-26
Payer: MEDICARE

## 2024-07-19 ENCOUNTER — OFFICE VISIT (OUTPATIENT)
Dept: PAIN MEDICINE | Facility: CLINIC | Age: 89
End: 2024-07-19
Payer: MEDICARE

## 2024-07-19 VITALS
BODY MASS INDEX: 24.25 KG/M2 | HEIGHT: 63 IN | HEART RATE: 59 BPM | SYSTOLIC BLOOD PRESSURE: 155 MMHG | DIASTOLIC BLOOD PRESSURE: 75 MMHG | WEIGHT: 136.88 LBS

## 2024-07-19 DIAGNOSIS — M25.551 RIGHT HIP PAIN: Primary | ICD-10-CM

## 2024-07-19 DIAGNOSIS — G57.01 PIRIFORMIS SYNDROME OF RIGHT SIDE: ICD-10-CM

## 2024-07-19 DIAGNOSIS — M47.816 LUMBAR SPONDYLOSIS: ICD-10-CM

## 2024-07-19 DIAGNOSIS — M79.18 MYOFASCIAL PAIN: ICD-10-CM

## 2024-07-19 DIAGNOSIS — M54.12 CERVICAL RADICULOPATHY: ICD-10-CM

## 2024-07-19 DIAGNOSIS — M79.10 MYALGIA: ICD-10-CM

## 2024-07-19 PROCEDURE — 99999PBSHW PR PBB SHADOW TECHNICAL ONLY FILED TO HB: Mod: PBBFAC,,,

## 2024-07-19 PROCEDURE — 20553 NJX 1/MLT TRIGGER POINTS 3/>: CPT | Mod: PBBFAC,PN | Performed by: STUDENT IN AN ORGANIZED HEALTH CARE EDUCATION/TRAINING PROGRAM

## 2024-07-19 PROCEDURE — 99999 PR PBB SHADOW E&M-EST. PATIENT-LVL IV: CPT | Mod: PBBFAC,,, | Performed by: STUDENT IN AN ORGANIZED HEALTH CARE EDUCATION/TRAINING PROGRAM

## 2024-07-19 PROCEDURE — 76942 ECHO GUIDE FOR BIOPSY: CPT | Mod: PBBFAC,PN | Performed by: STUDENT IN AN ORGANIZED HEALTH CARE EDUCATION/TRAINING PROGRAM

## 2024-07-19 PROCEDURE — 99214 OFFICE O/P EST MOD 30 MIN: CPT | Mod: PBBFAC,PN,25 | Performed by: STUDENT IN AN ORGANIZED HEALTH CARE EDUCATION/TRAINING PROGRAM

## 2024-07-19 RX ORDER — TRIAMCINOLONE ACETONIDE 40 MG/ML
20 INJECTION, SUSPENSION INTRA-ARTICULAR; INTRAMUSCULAR
Status: COMPLETED | OUTPATIENT
Start: 2024-07-19 | End: 2024-07-19

## 2024-07-19 RX ADMIN — TRIAMCINOLONE ACETONIDE 20 MG: 400 INJECTION, SUSPENSION INTRA-ARTICULAR; INTRAMUSCULAR at 05:07

## 2024-07-19 NOTE — PROCEDURES
Procedures  PROCEDURE: Trigger Point Injections with Ultrasound Guidance- Location of Myofascial Pain - left Glut Medius, Glut Minimus, and Piriformis    PATIENT NAME: Gerda Lai   MRN: [id number]     DATE OF PROCEDURE: 07/19/2024    Diagnosis: Myalgia (M79.1)  CPT code: 84658 (Injection(s); single or multiple trigger point(s), 3 or more muscles), 57940 (Ultrasonic guidance for needle placement imaging supervision and interpretation)    Injection # 1 this year.    Postprocedural Diagnosis: Same  Needle Type: - Stimuplex 21G x 100mm needle    Solution injected: A 6ml mixture of 0.25% Bupivacaine and 20mg Kenalog  Volume Injected for each Trigger Point: 2ml  Number of Trigger Point Injected: 3    Estimated Blood Loss - <2ml  Drains: None  Specimens Removed: None  Urine Output - Not Measured  Complications: None  Outcome: Good  VAS Before Injection:  6/10  VAS After Injection:  0/10    Informed Consent:  The patient's condition and proposed procedures, risks (including complications of nerve damage,  bleeding, infection, and failure of pain relief), and alternatives were discussed with the patient or responsible party.  The patient's/responsible party's questions were answered.  The patient/responsible party appeared to understand and chose to proceed.  Informed consent was obtained.    Procedure in Detail:  The procedure was performed in the procedure treatment room.  After obtaining written consent, the patient was placed in a  Prone  position. By ultrasound the target muscles were identified that correlated with the patient's pain pattern.    Procedural Pause:  A procedural pause verifying correct patient, medical record number, allergies, medications to be administered, current vital signs, and surgical site was performed immediately prior to beginning the procedure.    The above noted needle was advanced towards each trigger point under ultrasound guidance until the patient's typical radiating pain pattern was  reproduced. Muscle twitch or reproduction of pain confirmed proper placement of the needle. After negative aspiration for heme, the above noted solution was injected in a fanned-out distribution at each trigger point. The needle(s) was then removed.     All sites were done in the same manner. The patient tolerated the procedure well and without complications. After meeting discharge criteria, the patient was discharged home safely.    DISCUSSION:  Trigger point injections were performed today to treat the patients myofascial pain. The purpose was to improve the patients function and decrease pain. We have reminded the patient that the trigger point injections must be done in conjunction with a stretching program.  Without a stretching program, results from trigger point injections are often suboptimal. The patient was advised to relax and avoid any heavy lifting or excessive bending for 24 hours. She was advised that she may return to her usual activities after 24 hours if she is otherwise feeling well.  The patient was advised not to bathe or soak in water for 24 hours but that showering would be acceptable.      Note Electronically Signed By:  Andi Wilson  07/19/2024
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
## 2024-07-22 ENCOUNTER — HOSPITAL ENCOUNTER (OUTPATIENT)
Dept: RADIOLOGY | Facility: HOSPITAL | Age: 89
Discharge: HOME | End: 2024-07-22
Attending: STUDENT IN AN ORGANIZED HEALTH CARE EDUCATION/TRAINING PROGRAM
Payer: MEDICARE

## 2024-07-22 DIAGNOSIS — M25.551 RIGHT HIP PAIN: ICD-10-CM

## 2024-07-22 PROCEDURE — 73502 X-RAY EXAM HIP UNI 2-3 VIEWS: CPT | Mod: TC,RT

## 2024-07-22 PROCEDURE — 73502 X-RAY EXAM HIP UNI 2-3 VIEWS: CPT | Mod: 26,RT,, | Performed by: RADIOLOGY

## 2024-07-23 NOTE — TELEPHONE ENCOUNTER
No care due was identified.  [facility] Embedded Care Due Messages. Reference number: [id number].   7/23/2024 6:09:45 PM CDT

## 2024-07-24 RX ORDER — LABETALOL 100 MG/1
TABLET, FILM COATED ORAL
Qty: 270 TABLET | Refills: 2 | Status: SHIPPED | OUTPATIENT
Start: 2024-07-24

## 2024-07-24 NOTE — TELEPHONE ENCOUNTER
Refill Routing Note   Medication(s) are not appropriate for processing by [facility] for the following reason(s):        No active prescription written by provider  Required vitals abnormal    [facility] action(s):  Defer               Appointments  past 12m or future 3m with PCP    Date Provider   Last Visit   5/7/2024 Tomas Beltran MD   Next Visit   11/11/2024 Tomas Beltran MD   ED visits in past 90 days: 0        Note composed:11:35 PM 07/23/2024

## 2024-08-13 ENCOUNTER — CLINICAL SUPPORT (OUTPATIENT)
Dept: REHABILITATION | Facility: HOSPITAL | Age: 89
End: 2024-08-13
Attending: STUDENT IN AN ORGANIZED HEALTH CARE EDUCATION/TRAINING PROGRAM
Payer: MEDICARE

## 2024-08-13 DIAGNOSIS — M47.816 LUMBAR SPONDYLOSIS: ICD-10-CM

## 2024-08-13 DIAGNOSIS — M54.12 CERVICAL RADICULOPATHY: ICD-10-CM

## 2024-08-13 DIAGNOSIS — M62.9 HAMSTRING TIGHTNESS OF BOTH LOWER EXTREMITIES: ICD-10-CM

## 2024-08-13 DIAGNOSIS — R29.898 WEAKNESS OF BOTH HIPS: ICD-10-CM

## 2024-08-13 DIAGNOSIS — M25.651 IMPAIRED RANGE OF MOTION OF RIGHT HIP: Primary | ICD-10-CM

## 2024-08-13 DIAGNOSIS — R26.89 DECREASED BACK MOBILITY: ICD-10-CM

## 2024-08-13 DIAGNOSIS — G57.01 PIRIFORMIS SYNDROME OF RIGHT SIDE: ICD-10-CM

## 2024-08-13 PROCEDURE — 97161 PT EVAL LOW COMPLEX 20 MIN: CPT | Mod: PO | Performed by: PHYSICAL THERAPIST
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
## 2024-08-14 PROBLEM — R29.898 WEAKNESS OF BOTH HIPS: Status: ACTIVE | Noted: 2024-08-14

## 2024-08-14 PROBLEM — M25.651 IMPAIRED RANGE OF MOTION OF RIGHT HIP: Status: ACTIVE | Noted: 2024-08-14

## 2024-08-14 PROBLEM — R26.89 DECREASED BACK MOBILITY: Status: ACTIVE | Noted: 2024-08-14

## 2024-08-14 PROBLEM — M62.9 HAMSTRING TIGHTNESS OF BOTH LOWER EXTREMITIES: Status: ACTIVE | Noted: 2024-08-14
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
## 2024-08-15 ENCOUNTER — CLINICAL SUPPORT (OUTPATIENT)
Dept: REHABILITATION | Facility: HOSPITAL | Age: 89
End: 2024-08-15
Payer: MEDICARE

## 2024-08-15 DIAGNOSIS — M62.9 HAMSTRING TIGHTNESS OF BOTH LOWER EXTREMITIES: ICD-10-CM

## 2024-08-15 DIAGNOSIS — R26.89 DECREASED BACK MOBILITY: ICD-10-CM

## 2024-08-15 DIAGNOSIS — M25.651 IMPAIRED RANGE OF MOTION OF RIGHT HIP: Primary | ICD-10-CM

## 2024-08-15 PROCEDURE — 97112 NEUROMUSCULAR REEDUCATION: CPT | Mod: PO

## 2024-08-15 NOTE — PROGRESS NOTES
[facility]   Physical Therapy Treatment Note      Name: Gerda Lai  Clinic Number: [id number]    Therapy Diagnosis: No diagnosis found.  Physician: Andi Cisneros,*    Visit Date: 8/15/2024    Physician Orders: PT Eval and Treat right gluteal   Medical Diagnosis from Referral:   Piriformis syndrome of right side [G57.01], Lumbar spondylosis [M47.816], Cervical radiculopathy [M54.12]   Evaluation Date: 8/13/2024  Authorization Period Expiration: 7/19/2025  Plan of Care Expiration: 11/13/2024  Progress Note Due: 9/13/2024  Visit # / Visits authorized: 1/ 1       Foto  Date / Visit# Score    #1/3 8/13/2024=1 48%   #2/3       #3/3          Precautions: Standard     Time In: 1315   arrived 1307  Time Out: 1410  Total Appointment Time (timed & untimed codes): 55 minutes   PTA Visit #: 0/5       Subjective     Patient reports: pain in her R buttock with sit to stand transfers.  She  Pt was not instructed in a HEP  compliant with home exercise program.  Response to previous treatment: a little sore  Functional change: ongoing    Pain: 0/10  Location: right Buttock region      Objective      Objective Measures updated at progress report unless specified.     Treatment     Gerda received the treatments listed below:      therapeutic exercises to develop strength, endurance, ROM, flexibility, posture, and core stabilization for 13 minutes including:  Lower trunk rotation 10 x 3  +Nu-step    manual therapy techniques: Joint mobilizations, Manual traction, and Soft tissue Mobilization were applied to the: lumbar spine for 0 minutes, including:        neuromuscular re-education activities to improve: Balance, Coordination, Kinesthetic, Sense, Proprioception, and Posture for 25 minutes. The following activities were included:  Hook lying B hip abduction 10 x 3 with red TB  Hook lying B hip adduction 10 x 3 with a ball   Bridges 10 x 3 with R knee flexed greater than L   Gluteal sets 10 x  2  therapeutic activities to improve functional performance for 00  minutes, including:      gait training to improve functional mobility and safety for 00  minutes, including:      Patient Education and Home Exercises       Education provided:   - review of previous home exercises    Written Home Exercises Provided: Yes. Exercises were reviewed and Gerda was able to demonstrate them prior to the end of the session.  Gerda demonstrated good  understanding of the education provided. See Electronic Medical Record under Patient Instructions for exercises provided during therapy sessions    Assessment     Ms. Lorenz presents with continued lower back pain.  She has weakness in her R LE secondary to a past ELZBIETA.  Exercises today were geared towards improving muscular support of the lumbar spine through core and R LE strengthening.  Pt is already performing some of the exercises reviewed today and tolerated them without increased discomfort.  Continue to progress core muscle strength and endurance.    Gerda Is progressing well towards her goals.   Patient prognosis is Good.     Patient will continue to benefit from skilled outpatient physical therapy to address the deficits listed in the problem list box on initial evaluation, provide pt/family education and to maximize pt's level of independence in the home and community environment.     Patient's spiritual, cultural and educational needs considered and pt agreeable to plan of care and goals.     Anticipated barriers to physical therapy: scheduling     Goals:   Short Term Goals: 5 weeks   1. Pts pain decreased 20 - 40% for improved functional mobility and quality of life ONGOING  2. Pts PROM in hip flexion and extension increased by 10 degrees to enhance AROM and functional mobility  ONGOING  3. Pts strength in the hip musculature increased by 1/2 grade to facilitate improved active mobility and functional stability ONGOING  4. Pt to exhibit correct return  demonstration of exercises for self-management and independence with HEP ONGOING  5. Pt to demonstrate enhanced neuromuscular response  with single leg static balance for improved functional mobility and gait pattern  ONGOING  6. Pt to experience 50% or more reduced interruption of sleep due to reduced pain for improved quality of life. ONGOING     Long Term Goals : 10 weeks   1.  Pts pain level decreased to 75% or greater for with siting for restoring functional mobility and ADL. ONGOING  2. Pts AROM in hip flexion, extension, abduction increased by 10 degrees to restore functional mobility and ADL  ONGOING  3. Pts strength in the hip musculature increased by one grade to facilitate improved active mobility and functional stability  ONGOING  4. Pt will be independent with HEP for self-management. ONGOING   5. Pt to exhibit dynamic stability on the RLE / LLE for stable functional mobility and gait. ONGOING   6. Pt to demonstrate ambulation w/o pain to improve gait pattern with a cane. ONGOING   Plan     Progress Physical Therapy program to assist Pt with managing her lower back   Sx.    Ebenezer Sanchez, PT

## 2024-08-16 DIAGNOSIS — I10 HYPERTENSION, ESSENTIAL: Chronic | ICD-10-CM

## 2024-08-17 RX ORDER — LORAZEPAM 0.5 MG/1
TABLET ORAL
Qty: 30 TABLET | Refills: 0 | Status: SHIPPED | OUTPATIENT
Start: 2024-08-17

## 2024-08-21 ENCOUNTER — CLINICAL SUPPORT (OUTPATIENT)
Dept: REHABILITATION | Facility: HOSPITAL | Age: 89
End: 2024-08-21
Payer: MEDICARE

## 2024-08-21 DIAGNOSIS — R26.89 DECREASED BACK MOBILITY: ICD-10-CM

## 2024-08-21 DIAGNOSIS — M62.9 HAMSTRING TIGHTNESS OF BOTH LOWER EXTREMITIES: ICD-10-CM

## 2024-08-21 DIAGNOSIS — M25.651 IMPAIRED RANGE OF MOTION OF RIGHT HIP: Primary | ICD-10-CM

## 2024-08-21 PROCEDURE — 97110 THERAPEUTIC EXERCISES: CPT | Mod: PO | Performed by: PHYSICAL THERAPIST

## 2024-08-21 PROCEDURE — 97112 NEUROMUSCULAR REEDUCATION: CPT | Mod: PO | Performed by: PHYSICAL THERAPIST

## 2024-08-21 NOTE — PROGRESS NOTES
"[facility]   Physical Therapy Treatment Note      Name: Gerda Lai  Clinic Number: [id number]    Therapy Diagnosis:   Encounter Diagnoses   Name Primary?    Impaired range of motion of right hip Yes    Hamstring tightness of both lower extremities     Decreased back mobility      Physician: Andi Cisneros,*    Visit Date: 8/21/2024    Physician Orders: PT Eval and Treat right gluteal   Medical Diagnosis from Referral:   Piriformis syndrome of right side [G57.01], Lumbar spondylosis [M47.816], Cervical radiculopathy [M54.12]   Evaluation Date: 8/13/2024  Authorization Period Expiration: 7/19/2025  Plan of Care Expiration: 11/13/2024  Progress Note Due: 9/13/2024  Visit # / Visits authorized: 1/ 1       Foto  Date / Visit# Score    #1/3 8/13/2024=1 48%   #2/3       #3/3          Precautions: Standard     Time In: 1410    Time Out: 1515  Total Appointment Time (timed & untimed codes): 65 minutes   PTA Visit #: 0/5       Subjective     Patient reports: she has some pain in the right buttocks and the knee.     She  Pt was not instructed in a HEP  compliant with home exercise program.  Response to previous treatment: felt fine   Functional change: ongoing    Pain: 1-2 / 10  Location: right Buttock region      Objective      Objective Measures updated at progress report unless specified.     Treatment     Gerda received the treatments listed below:    +Nu-step      therapeutic exercises to develop strength, endurance, ROM, flexibility, posture, and core stabilization for 15 minutes including:  SUPINE  Lower trunk rotation 10 x 3    PRONE   -sustained extension 3'     SIDE LYING   -open book x12     neuromuscular re-education activities to improve: Balance, Coordination, Kinesthetic, Sense, Proprioception, and Posture for 25 minutes. The following activities were included:  SUPINE   Hook lying B hip abduction 10 x 3 with yellow oval band   Hook lying B hip adduction hold 10" x 12 ball " squeeze   Bridges 10 x 3 with R knee flexed greater than L   Hip flexion with stretch x15  Gluteal sets 10 x 2    SIDE LYING   -open book x12   -clams x20  -hydrants x20    PRONE   -sustained extension 3'  -leg lift x15    STANDING  -leg swing abd / exten x15    manual therapy techniques: Joint mobilizations, Manual traction, and Soft tissue Mobilization were applied to the: lumbar spine for 0 minutes, including:      therapeutic activities to improve functional performance for 00  minutes, including:      gait training to improve functional mobility and safety for 00  minutes, including:      Patient Education and Home Exercises       Education provided:   - review of previous home exercises    Written Home Exercises Provided: Yes. Exercises were reviewed and Gerda was able to demonstrate them prior to the end of the session.  Gerda demonstrated good  understanding of the education provided. See Electronic Medical Record under Patient Instructions for exercises provided during therapy sessions    Assessment     Gerda performed the routine noted. The exercises were performed without any significant difficulty. She can execute a leg lift that is restricted in ROM actively. Progress with addressing mobility dysfunctions noted in the IE relative to the spine and hip     Gerda Is progressing well towards her goals.   Patient prognosis is Good.     Patient will continue to benefit from skilled outpatient physical therapy to address the deficits listed in the problem list box on initial evaluation, provide pt/family education and to maximize pt's level of independence in the home and community environment.     Patient's spiritual, cultural and educational needs considered and pt agreeable to plan of care and goals.     Anticipated barriers to physical therapy: scheduling     Goals:   Short Term Goals: 5 weeks   1. Pts pain decreased 20 - 40% for improved functional mobility and quality of life ONGOING  2. Pts PROM in  hip flexion and extension increased by 10 degrees to enhance AROM and functional mobility  ONGOING  3. Pts strength in the hip musculature increased by 1/2 grade to facilitate improved active mobility and functional stability ONGOING  4. Pt to exhibit correct return demonstration of exercises for self-management and independence with HEP ONGOING  5. Pt to demonstrate enhanced neuromuscular response  with single leg static balance for improved functional mobility and gait pattern  ONGOING  6. Pt to experience 50% or more reduced interruption of sleep due to reduced pain for improved quality of life. ONGOING     Long Term Goals : 10 weeks   1.  Pts pain level decreased to 75% or greater for with siting for restoring functional mobility and ADL. ONGOING  2. Pts AROM in hip flexion, extension, abduction increased by 10 degrees to restore functional mobility and ADL  ONGOING  3. Pts strength in the hip musculature increased by one grade to facilitate improved active mobility and functional stability  ONGOING  4. Pt will be independent with HEP for self-management. ONGOING   5. Pt to exhibit dynamic stability on the RLE / LLE for stable functional mobility and gait. ONGOING   6. Pt to demonstrate ambulation w/o pain to improve gait pattern with a cane. ONGOING   Plan     Progress Physical Therapy program to assist Pt with managing her lower back   Sx.    Albert Mills, PT

## 2024-08-23 ENCOUNTER — CLINICAL SUPPORT (OUTPATIENT)
Dept: REHABILITATION | Facility: HOSPITAL | Age: 89
End: 2024-08-23
Payer: MEDICARE

## 2024-08-23 DIAGNOSIS — M62.9 HAMSTRING TIGHTNESS OF BOTH LOWER EXTREMITIES: ICD-10-CM

## 2024-08-23 DIAGNOSIS — M25.651 IMPAIRED RANGE OF MOTION OF RIGHT HIP: Primary | ICD-10-CM

## 2024-08-23 DIAGNOSIS — R26.89 DECREASED BACK MOBILITY: ICD-10-CM

## 2024-08-23 PROCEDURE — 97112 NEUROMUSCULAR REEDUCATION: CPT | Mod: PO

## 2024-08-27 ENCOUNTER — CLINICAL SUPPORT (OUTPATIENT)
Dept: REHABILITATION | Facility: HOSPITAL | Age: 89
End: 2024-08-27
Payer: MEDICARE

## 2024-08-27 DIAGNOSIS — R26.89 DECREASED BACK MOBILITY: ICD-10-CM

## 2024-08-27 DIAGNOSIS — M62.9 HAMSTRING TIGHTNESS OF BOTH LOWER EXTREMITIES: ICD-10-CM

## 2024-08-27 DIAGNOSIS — M25.651 IMPAIRED RANGE OF MOTION OF RIGHT HIP: Primary | ICD-10-CM

## 2024-08-27 PROCEDURE — 97112 NEUROMUSCULAR REEDUCATION: CPT | Mod: PO | Performed by: PHYSICAL THERAPIST

## 2024-08-27 PROCEDURE — 97110 THERAPEUTIC EXERCISES: CPT | Mod: PO | Performed by: PHYSICAL THERAPIST

## 2024-08-27 NOTE — PROGRESS NOTES
[facility]   Physical Therapy Treatment Note      Name: Gerda Lai  Clinic Number: [id number]    Therapy Diagnosis:   Encounter Diagnoses   Name Primary?    Impaired range of motion of right hip Yes    Hamstring tightness of both lower extremities     Decreased back mobility        Physician: Andi Cisneros,*    Visit Date: 8/27/2024    Physician Orders: PT Eval and Treat right gluteal   Medical Diagnosis from Referral:   Piriformis syndrome of right side [G57.01], Lumbar spondylosis [M47.816], Cervical radiculopathy [M54.12]   Evaluation Date: 8/13/2024  Authorization Period Expiration: 7/19/2025  Plan of Care Expiration: 11/13/2024  Progress Note Due: 9/13/2024  Visit # / Visits authorized: 4 /20       Foto  Date / Visit# Score    #1/3 8/13/2024=1 48%   #2/3       #3/3          Precautions: Standard     Time In: 1405   Time Out: 1510  Total Appointment Time (timed & untimed codes): 65 minutes   PTA Visit #: 0/5       Subjective     Patient reports: feeling OK just a little dizzy. No pain.     He was not provided with home exercise program at initial evaluation.  Response to previous treatment: felt fine. May have tired me out.   Functional change: ongoing    Pain: not reported / 10  Location: NA    Objective      Objective Measures updated at progress report unless specified.     Treatment     .BOLD INDICATES ACTIVITIES PERFORMED / DISCUSSED      Gerda received the treatments listed below:    Nu-step x 6 minutes      therapeutic exercises to develop strength, endurance, ROM, flexibility, posture, and core stabilization for 20 minutes including:  SUPINE  Lower trunk rotation 10 x 3    PRONE   -sustained extension 3'     SIDE LYING   -open book x12     neuromuscular re-education activities to improve: Balance, Coordination, Kinesthetic, Sense, Proprioception, and Posture for 38 minutes. The following activities were included:  SUPINE   Hook lying B hip abduction 10 x 3 with  "yellow oval band   Hook lying B hip adduction hold 10" x 12 ball squeeze   Bridges 10 x 3 with R knee flexed greater than L   Hip flexion with stretch x15  Gluteal sets 10 x 2    SIDE LYING   -open book x12   -clams x20 YOB  -hydrants x20 YOB              PRONE   -leg lift x15    STANDING  -leg swing abd / exten x15 challenged with thera ball for support     manual therapy techniques: Joint mobilizations, Manual traction, and Soft tissue Mobilization were applied to the: lumbar spine for 0 minutes, including:      therapeutic activities to improve functional performance for 00  minutes, including:  +sit to stand   +steps     gait training to improve functional mobility and safety for 00  minutes, including:      Patient Education and Home Exercises       Education provided:   - review of previous home exercises    Written Home Exercises Provided: Yes. Exercises were reviewed and Gerda was able to demonstrate them prior to the end of the session.  Gerda demonstrated good  understanding of the education provided. See Electronic Medical Record under Patient Instructions for exercises provided during therapy sessions    Assessment     The patient performed the routine noted. She exhibited poor core stability when attempting leg swings in abduction and extension. She can not maintain standing stability on one leg while swinging the other. She can only perform a step. Pain is not a factor.     Gerda Is progressing well towards her goals.   Patient prognosis is Good.     Patient will continue to benefit from skilled outpatient physical therapy to address the deficits listed in the problem list box on initial evaluation, provide pt/family education and to maximize pt's level of independence in the home and community environment.     Patient's spiritual, cultural and educational needs considered and pt agreeable to plan of care and goals.     Anticipated barriers to physical therapy: scheduling     Goals:   Short Term " Goals: 5 weeks   1. Pts pain decreased 20 - 40% for improved functional mobility and quality of life ONGOING  2. Pts PROM in hip flexion and extension increased by 10 degrees to enhance AROM and functional mobility  ONGOING  3. Pts strength in the hip musculature increased by 1/2 grade to facilitate improved active mobility and functional stability ONGOING  4. Pt to exhibit correct return demonstration of exercises for self-management and independence with HEP ONGOING  5. Pt to demonstrate enhanced neuromuscular response  with single leg static balance for improved functional mobility and gait pattern  ONGOING  6. Pt to experience 50% or more reduced interruption of sleep due to reduced pain for improved quality of life. ONGOING     Long Term Goals : 10 weeks   1.  Pts pain level decreased to 75% or greater for with siting for restoring functional mobility and ADL. ONGOING  2. Pts AROM in hip flexion, extension, abduction increased by 10 degrees to restore functional mobility and ADL  ONGOING  3. Pts strength in the hip musculature increased by one grade to facilitate improved active mobility and functional stability  ONGOING  4. Pt will be independent with HEP for self-management. ONGOING   5. Pt to exhibit dynamic stability on the RLE / LLE for stable functional mobility and gait. ONGOING   6. Pt to demonstrate ambulation w/o pain to improve gait pattern with a cane. ONGOING   Plan     Progress Physical Therapy program to assist Pt with managing her lower back   Sx.    Albert Mills, PT

## 2024-08-29 ENCOUNTER — CLINICAL SUPPORT (OUTPATIENT)
Dept: REHABILITATION | Facility: HOSPITAL | Age: 89
End: 2024-08-29
Payer: MEDICARE

## 2024-08-29 DIAGNOSIS — I70.0 AORTIC ATHEROSCLEROSIS: Chronic | ICD-10-CM

## 2024-08-29 DIAGNOSIS — I65.23 BILATERAL CAROTID ARTERY STENOSIS: ICD-10-CM

## 2024-08-29 DIAGNOSIS — M62.9 HAMSTRING TIGHTNESS OF BOTH LOWER EXTREMITIES: ICD-10-CM

## 2024-08-29 DIAGNOSIS — R26.89 DECREASED BACK MOBILITY: ICD-10-CM

## 2024-08-29 DIAGNOSIS — E78.5 HYPERLIPIDEMIA, UNSPECIFIED HYPERLIPIDEMIA TYPE: Chronic | ICD-10-CM

## 2024-08-29 DIAGNOSIS — M25.651 IMPAIRED RANGE OF MOTION OF RIGHT HIP: Primary | ICD-10-CM

## 2024-08-29 PROCEDURE — 97112 NEUROMUSCULAR REEDUCATION: CPT | Mod: PO | Performed by: PHYSICAL THERAPIST

## 2024-08-29 RX ORDER — PRAVASTATIN SODIUM 80 MG/1
80 TABLET ORAL DAILY
Qty: 90 TABLET | Refills: 3 | Status: SHIPPED | OUTPATIENT
Start: 2024-08-29

## 2024-08-29 NOTE — PROGRESS NOTES
[facility]   Physical Therapy Treatment Note      Name: Gerda Lai  Clinic Number: [id number]    Therapy Diagnosis:   Encounter Diagnoses   Name Primary?    Impaired range of motion of right hip Yes    Hamstring tightness of both lower extremities     Decreased back mobility        Physician: Andi Cisneros,*    Visit Date: 8/29/2024    Physician Orders: PT Eval and Treat right gluteal   Medical Diagnosis from Referral:   Piriformis syndrome of right side [G57.01], Lumbar spondylosis [M47.816], Cervical radiculopathy [M54.12]   Evaluation Date: 8/13/2024  Authorization Period Expiration: 7/19/2025  Plan of Care Expiration: 11/13/2024  Progress Note Due: 9/13/2024  Visit # / Visits authorized: 4 /20       Foto  Date / Visit# Score    #1/3 8/13/2024=1 48%   #2/3       #3/3          Precautions: Standard     Time In: 1440  Time Out: 1535  Total Appointment Time (timed & untimed codes): 55 minutes   PTA Visit #: 0/5       Subjective     Patient reports: the back / buttocks are doing OK. Some pain but siting there is slight pain below the knee and side of the thigh.   She was not provided with home exercise program at initial evaluation.  Response to previous treatment: strenuous session and had carryover the next day.   Functional change: ongoing    Pain: 1 / 10  Location: NA    Objective      Objective Measures updated at progress report unless specified.     Treatment     .BOLD INDICATES ACTIVITIES PERFORMED / DISCUSSED      Gerda received the treatments listed below:    Nu-step x 6 minutes      therapeutic exercises to develop strength, endurance, ROM, flexibility, posture, and core stabilization for 20 minutes including:  SUPINE  Lower trunk rotation 10 x 3    PRONE   -sustained extension 3'     SIDE LYING   -open book x12     neuromuscular re-education activities to improve: Balance, Coordination, Kinesthetic, Sense, Proprioception, and Posture for 38 minutes. The following  "activities were included:  SUPINE   Hook lying B hip abduction 10 x 3 with yellow oval band   Hook lying B hip adduction hold 10" x 12 ball squeeze   Bridges 10 x 3 with R knee flexed greater than L   Hip flexion with stretch x15  Gluteal sets 10 x 2    SIDE LYING   -open book x12   -clams x20 YOB  -hydrants x20 YOB              PRONE   -leg lift x15    STANDING  -leg swing abd / exten x15 challenged with t12# MB for support     manual therapy techniques: Joint mobilizations, Manual traction, and Soft tissue Mobilization were applied to the: lumbar spine for 0 minutes, including:      therapeutic activities to improve functional performance for 00  minutes, including:  +sit to stand   +steps     gait training to improve functional mobility and safety for 00  minutes, including:      Patient Education and Home Exercises       Education provided:   - review of previous home exercises    Written Home Exercises Provided: Yes. Exercises were reviewed and Gerda was able to demonstrate them prior to the end of the session.  Gerda demonstrated good  understanding of the education provided. See Electronic Medical Record under Patient Instructions for exercises provided during therapy sessions    Assessment     The patient performed the routine as noted. She did not require significant directives to perform the activities. She exhibited much better stability performing the LE exercises using the MB for support.  Progressing with ROM emphasis.     Gerda Is progressing well towards her goals.   Patient prognosis is Good.     Patient will continue to benefit from skilled outpatient physical therapy to address the deficits listed in the problem list box on initial evaluation, provide pt/family education and to maximize pt's level of independence in the home and community environment.     Patient's spiritual, cultural and educational needs considered and pt agreeable to plan of care and goals.     Anticipated barriers to " physical therapy: scheduling     Goals:   Short Term Goals: 5 weeks   1. Pts pain decreased 20 - 40% for improved functional mobility and quality of life ONGOING  2. Pts PROM in hip flexion and extension increased by 10 degrees to enhance AROM and functional mobility  ONGOING  3. Pts strength in the hip musculature increased by 1/2 grade to facilitate improved active mobility and functional stability ONGOING  4. Pt to exhibit correct return demonstration of exercises for self-management and independence with HEP ONGOING  5. Pt to demonstrate enhanced neuromuscular response  with single leg static balance for improved functional mobility and gait pattern  ONGOING  6. Pt to experience 50% or more reduced interruption of sleep due to reduced pain for improved quality of life. ONGOING     Long Term Goals : 10 weeks   1.  Pts pain level decreased to 75% or greater for with siting for restoring functional mobility and ADL. ONGOING  2. Pts AROM in hip flexion, extension, abduction increased by 10 degrees to restore functional mobility and ADL  ONGOING  3. Pts strength in the hip musculature increased by one grade to facilitate improved active mobility and functional stability  ONGOING  4. Pt will be independent with HEP for self-management. ONGOING   5. Pt to exhibit dynamic stability on the RLE / LLE for stable functional mobility and gait. ONGOING   6. Pt to demonstrate ambulation w/o pain to improve gait pattern with a cane. ONGOING   Plan     Progress Physical Therapy program to assist Pt with managing her lower back   Sx.    Albert Mills, PT

## 2024-09-03 ENCOUNTER — CLINICAL SUPPORT (OUTPATIENT)
Dept: REHABILITATION | Facility: HOSPITAL | Age: 89
End: 2024-09-03
Payer: MEDICARE

## 2024-09-03 DIAGNOSIS — M62.9 HAMSTRING TIGHTNESS OF BOTH LOWER EXTREMITIES: ICD-10-CM

## 2024-09-03 DIAGNOSIS — R26.89 DECREASED BACK MOBILITY: ICD-10-CM

## 2024-09-03 DIAGNOSIS — M25.651 IMPAIRED RANGE OF MOTION OF RIGHT HIP: Primary | ICD-10-CM

## 2024-09-03 PROCEDURE — 97112 NEUROMUSCULAR REEDUCATION: CPT | Mod: KX,PO

## 2024-09-03 NOTE — PROGRESS NOTES
"[facility]   Physical Therapy Treatment Note      Name: Gerda Lai  Clinic Number: [id number]    Therapy Diagnosis:   Encounter Diagnoses   Name Primary?    Impaired range of motion of right hip Yes    Hamstring tightness of both lower extremities     Decreased back mobility          Physician: Andi Cisneros,*    Visit Date: 9/3/2024    Physician Orders: PT Eval and Treat right gluteal   Medical Diagnosis from Referral:   Piriformis syndrome of right side [G57.01], Lumbar spondylosis [M47.816], Cervical radiculopathy [M54.12]   Evaluation Date: 8/13/2024  Authorization Period Expiration: 7/19/2025  Plan of Care Expiration: 11/13/2024  Progress Note Due: 9/13/2024  Visit # / Visits authorized: 5 /20       Foto  Date / Visit# Score    #1/3 8/13/2024=1 48%   #2/3       #3/3          Precautions: Standard     Time In: 1700  Time Out: 1755  Total Appointment Time (timed & untimed codes): 30 minutes   PTA Visit #: 0/5       Subjective     Patient reports: that she feels as if her R hip is "sticking" out,  She was not provided with home exercise program at initial evaluation.  Response to previous treatment: strenuous session and had carryover the next day.   Functional change: ongoing    Pain: 1 / 10  Location: NA    Objective      Objective Measures updated at progress report unless specified.     Treatment     .BOLD INDICATES ACTIVITIES PERFORMED / DISCUSSED      Gerda received the treatments listed below:    Nu-step x 6 minutes      therapeutic exercises to develop strength, endurance, ROM, flexibility, posture, and core stabilization for 20 minutes including:  SUPINE  Lower trunk rotation 10 x 3    PRONE   -sustained extension 3'     SIDE LYING   -open book x12     neuromuscular re-education activities to improve: Balance, Coordination, Kinesthetic, Sense, Proprioception, and Posture for 38 minutes. The following activities were included:  SUPINE   Hook lying B hip abduction 10 " "x 3 with yellow oval band   Hook lying B hip adduction hold 10" x 12 ball squeeze   Bridges 10 x 3 with R knee flexed greater than L   Hip flexion with stretch x15  Gluteal sets 10 x 2    SIDE LYING   -open book x12   -clams x20 YOB  -hydrants x20 YOB              PRONE   -leg lift x15    STANDING  -leg swing abd / exten x15 challenged with t12# MB for support     manual therapy techniques: Joint mobilizations, Manual traction, and Soft tissue Mobilization were applied to the: lumbar spine for 0 minutes, including:      therapeutic activities to improve functional performance for 00  minutes, including:  +sit to stand   +steps     gait training to improve functional mobility and safety for 00  minutes, including:      Patient Education and Home Exercises       Education provided:   - review of previous home exercises    Written Home Exercises Provided: Yes. Exercises were reviewed and Gerda was able to demonstrate them prior to the end of the session.  Gerda demonstrated good  understanding of the education provided. See Electronic Medical Record under Patient Instructions for exercises provided during therapy sessions    Assessment     The patient performed all of her exercises as listed above. She was able to do so without verbal cueing.  Pt tolerated all Tx well, but had difficulty positioning for that sustained trunk extension positioning.  Gerda Is progressing well towards her goals.   Patient prognosis is Good.     Patient will continue to benefit from skilled outpatient physical therapy to address the deficits listed in the problem list box on initial evaluation, provide pt/family education and to maximize pt's level of independence in the home and community environment.     Patient's spiritual, cultural and educational needs considered and pt agreeable to plan of care and goals.     Anticipated barriers to physical therapy: scheduling     Goals:   Short Term Goals: 5 weeks   1. Pts pain decreased 20 - 40% " for improved functional mobility and quality of life ONGOING  2. Pts PROM in hip flexion and extension increased by 10 degrees to enhance AROM and functional mobility  ONGOING  3. Pts strength in the hip musculature increased by 1/2 grade to facilitate improved active mobility and functional stability ONGOING  4. Pt to exhibit correct return demonstration of exercises for self-management and independence with HEP ONGOING  5. Pt to demonstrate enhanced neuromuscular response  with single leg static balance for improved functional mobility and gait pattern  ONGOING  6. Pt to experience 50% or more reduced interruption of sleep due to reduced pain for improved quality of life. ONGOING     Long Term Goals : 10 weeks   1.  Pts pain level decreased to 75% or greater for with siting for restoring functional mobility and ADL. ONGOING  2. Pts AROM in hip flexion, extension, abduction increased by 10 degrees to restore functional mobility and ADL  ONGOING  3. Pts strength in the hip musculature increased by one grade to facilitate improved active mobility and functional stability  ONGOING  4. Pt will be independent with HEP for self-management. ONGOING   5. Pt to exhibit dynamic stability on the RLE / LLE for stable functional mobility and gait. ONGOING   6. Pt to demonstrate ambulation w/o pain to improve gait pattern with a cane. ONGOING   Plan     Progress Physical Therapy program to assist Pt with managing her lower back   Sx.    Ebenezer Sanchez, PT

## 2024-09-05 ENCOUNTER — CLINICAL SUPPORT (OUTPATIENT)
Dept: REHABILITATION | Facility: HOSPITAL | Age: 89
End: 2024-09-05
Payer: MEDICARE

## 2024-09-05 DIAGNOSIS — M62.9 HAMSTRING TIGHTNESS OF BOTH LOWER EXTREMITIES: ICD-10-CM

## 2024-09-05 DIAGNOSIS — M25.651 IMPAIRED RANGE OF MOTION OF RIGHT HIP: Primary | ICD-10-CM

## 2024-09-05 DIAGNOSIS — R26.89 DECREASED BACK MOBILITY: ICD-10-CM

## 2024-09-05 PROCEDURE — 97112 NEUROMUSCULAR REEDUCATION: CPT | Mod: KX,PO

## 2024-09-05 NOTE — PROGRESS NOTES
"[facility]   Physical Therapy Treatment Note      Name: Gerda Lai  Clinic Number: [id number]    Therapy Diagnosis:   No diagnosis found.        Physician: Andi Cisneros,*    Visit Date: 9/5/2024    Physician Orders: PT Eval and Treat right gluteal   Medical Diagnosis from Referral:   Piriformis syndrome of right side [G57.01], Lumbar spondylosis [M47.816], Cervical radiculopathy [M54.12]   Evaluation Date: 8/13/2024  Authorization Period Expiration: 7/19/2025  Plan of Care Expiration: 11/13/2024  Progress Note Due: 9/13/2024  Visit # / Visits authorized: 5 /20       Foto  Date / Visit# Score    #1/3 8/13/2024=1 48%   #2/3       #3/3          Precautions: Standard     Time In: 1700  Time Out: 1755  Total Appointment Time (timed & untimed codes): 30 minutes   PTA Visit #: 0/5       Subjective     Patient reports: that she is doing well today.  She was not provided with home exercise program at initial evaluation.  Response to previous treatment: strenuous session and had carryover the next day.   Functional change: ongoing    Pain: 1 / 10  Location: NA    Objective      Objective Measures updated at progress report unless specified.     Treatment     .BOLD INDICATES ACTIVITIES PERFORMED / DISCUSSED      Gerda received the treatments listed below:    Nu-step x 6 minutes      therapeutic exercises to develop strength, endurance, ROM, flexibility, posture, and core stabilization for 20 minutes including:  SUPINE  Lower trunk rotation 10 x 3    PRONE   -sustained extension 3'     SIDE LYING   -open book x12     neuromuscular re-education activities to improve: Balance, Coordination, Kinesthetic, Sense, Proprioception, and Posture for 38 minutes. The following activities were included:  SUPINE   Hook lying B hip abduction 10 x 3 with yellow oval band   Hook lying B hip adduction hold 10" x 12 ball squeeze   Bridges 10 x 3 with R knee flexed greater than L   Hip flexion with stretch " x15  Gluteal sets 10 x 2    SIDE LYING   -open book x12   -clams x20 YOB  -hydrants x20 YOB              PRONE   -leg lift x15    STANDING  -leg swing abd / exten x15 challenged with t12# MB for support     manual therapy techniques: Joint mobilizations, Manual traction, and Soft tissue Mobilization were applied to the: lumbar spine for 0 minutes, including:      therapeutic activities to improve functional performance for 00  minutes, including:  +sit to stand   +steps     gait training to improve functional mobility and safety for 00  minutes, including:      Patient Education and Home Exercises       Education provided:   - Pt to continue with her HEP    Written Home Exercises Provided: Yes. Exercises were reviewed and Gerda was able to demonstrate them prior to the end of the session.  Gerda demonstrated good  understanding of the education provided. See Electronic Medical Record under Patient Instructions for exercises provided during therapy sessions    Assessment     The patient with no c/o pain today.  She was able to tolerate all Tx activities well, but continues to have  difficulty assuming the position for sustained trunk extension.   Gerda Is progressing well towards her goals.   Patient prognosis is Good.     Patient will continue to benefit from skilled outpatient physical therapy to address the deficits listed in the problem list box on initial evaluation, provide pt/family education and to maximize pt's level of independence in the home and community environment.     Patient's spiritual, cultural and educational needs considered and pt agreeable to plan of care and goals.     Anticipated barriers to physical therapy: scheduling     Goals:   Short Term Goals: 5 weeks   1. Pts pain decreased 20 - 40% for improved functional mobility and quality of life ONGOING  2. Pts PROM in hip flexion and extension increased by 10 degrees to enhance AROM and functional mobility  ONGOING  3. Pts strength in the  hip musculature increased by 1/2 grade to facilitate improved active mobility and functional stability ONGOING  4. Pt to exhibit correct return demonstration of exercises for self-management and independence with HEP ONGOING  5. Pt to demonstrate enhanced neuromuscular response  with single leg static balance for improved functional mobility and gait pattern  ONGOING  6. Pt to experience 50% or more reduced interruption of sleep due to reduced pain for improved quality of life. ONGOING     Long Term Goals : 10 weeks   1.  Pts pain level decreased to 75% or greater for with siting for restoring functional mobility and ADL. ONGOING  2. Pts AROM in hip flexion, extension, abduction increased by 10 degrees to restore functional mobility and ADL  ONGOING  3. Pts strength in the hip musculature increased by one grade to facilitate improved active mobility and functional stability  ONGOING  4. Pt will be independent with HEP for self-management. ONGOING   5. Pt to exhibit dynamic stability on the RLE / LLE for stable functional mobility and gait. ONGOING   6. Pt to demonstrate ambulation w/o pain to improve gait pattern with a cane. ONGOING   Plan     Progress Physical Therapy program to assist Pt with managing her lower back   Sx.    Ebenezer Sanchez, PT

## 2024-09-09 ENCOUNTER — CLINICAL SUPPORT (OUTPATIENT)
Dept: REHABILITATION | Facility: HOSPITAL | Age: 89
End: 2024-09-09
Payer: MEDICARE

## 2024-09-09 DIAGNOSIS — R26.89 DECREASED BACK MOBILITY: ICD-10-CM

## 2024-09-09 DIAGNOSIS — M25.651 IMPAIRED RANGE OF MOTION OF RIGHT HIP: Primary | ICD-10-CM

## 2024-09-09 DIAGNOSIS — M62.9 HAMSTRING TIGHTNESS OF BOTH LOWER EXTREMITIES: ICD-10-CM

## 2024-09-09 PROCEDURE — 97112 NEUROMUSCULAR REEDUCATION: CPT | Mod: PO | Performed by: PHYSICAL THERAPIST

## 2024-09-09 NOTE — PROGRESS NOTES
[facility]   Physical Therapy Treatment Note      Name: Gerda Lai  Clinic Number: [id number]    Therapy Diagnosis:   Encounter Diagnoses   Name Primary?    Impaired range of motion of right hip Yes    Hamstring tightness of both lower extremities     Decreased back mobility            Physician: Andi Cisneros,*    Visit Date: 9/9/2024    Physician Orders: PT Eval and Treat right gluteal   Medical Diagnosis from Referral:   Piriformis syndrome of right side [G57.01], Lumbar spondylosis [M47.816], Cervical radiculopathy [M54.12]   Evaluation Date: 8/13/2024  Authorization Period Expiration: 7/19/2025  Plan of Care Expiration: 11/13/2024  Progress Note Due: 9/13/2024  Visit # / Visits authorized: 5 /20       Foto  Date / Visit# Score    #1/3 8/13/2024=1 48%   #2/3       #3/3          Precautions: Standard     Time In: 1410  Time Out: 1510  Total Appointment Time (timed & untimed codes): 30 minutes   PTA Visit #: 0/5       Subjective     Patient reports: she is feeling pretty good and there is no pain.   She was not provided with home exercise program at initial evaluation.  Response to previous treatment: did pretty good.   Functional change: ongoing    Pain: 0 / 10  Location: NA    Objective      Objective Measures updated at progress report unless specified.     Treatment     .BOLD INDICATES ACTIVITIES PERFORMED / DISCUSSED      Gerda received the treatments listed below:    Nu-step x 6 minutes      therapeutic exercises to develop strength, endurance, ROM, flexibility, posture, and core stabilization for 20 minutes including:  SUPINE  Lower trunk rotation 10 x 3    PRONE   -sustained extension 3'     SIDE LYING   -open book x12     neuromuscular re-education activities to improve: Balance, Coordination, Kinesthetic, Sense, Proprioception, and Posture for 38 minutes. The following activities were included:    SUPINE   Hook lying B hip abduction 10 x 3 with yellow oval band  "  Hook lying B hip adduction hold 10" x 12 ball squeeze   Bridges 10 x 3 with R knee flexed greater than L   Hip flexion with stretch x15  Gluteal sets 10 x 2    SIDE LYING   -open book x12   -clams x20 YOB  -hydrants x20 YOB              PRONE   -leg lift x15    STANDING  -leg swing abd / exten x15 challenged with t12# MB for support   -NB on airex with head turnes   -SL stand (static)  -Tandem balance   manual therapy techniques: Joint mobilizations, Manual traction, and Soft tissue Mobilization were applied to the: lumbar spine for 0 minutes, including:      therapeutic activities to improve functional performance for 00  minutes, including:  +sit to stand   +steps     gait training to improve functional mobility and safety for 00  minutes, including:      Patient Education and Home Exercises       Education provided:   - Pt to continue with her HEP    Written Home Exercises Provided: Yes. Exercises were reviewed and Gerda was able to demonstrate them prior to the end of the session.  Gerda demonstrated good  understanding of the education provided. See Electronic Medical Record under Patient Instructions for exercises provided during therapy sessions    Assessment     The patient completed the routine as noted. She did not encounter any difficulty with the exercises she performed. Progressing well.   Add balance activities.   Gerda Is progressing well towards her goals.   Patient prognosis is Good.     Patient will continue to benefit from skilled outpatient physical therapy to address the deficits listed in the problem list box on initial evaluation, provide pt/family education and to maximize pt's level of independence in the home and community environment.     Patient's spiritual, cultural and educational needs considered and pt agreeable to plan of care and goals.     Anticipated barriers to physical therapy: scheduling     Goals:   Short Term Goals: 5 weeks   1. Pts pain decreased 20 - 40% for improved " functional mobility and quality of life ONGOING  2. Pts PROM in hip flexion and extension increased by 10 degrees to enhance AROM and functional mobility  ONGOING  3. Pts strength in the hip musculature increased by 1/2 grade to facilitate improved active mobility and functional stability ONGOING  4. Pt to exhibit correct return demonstration of exercises for self-management and independence with HEP ONGOING  5. Pt to demonstrate enhanced neuromuscular response  with single leg static balance for improved functional mobility and gait pattern  ONGOING  6. Pt to experience 50% or more reduced interruption of sleep due to reduced pain for improved quality of life. ONGOING     Long Term Goals : 10 weeks   1.  Pts pain level decreased to 75% or greater for with siting for restoring functional mobility and ADL. ONGOING  2. Pts AROM in hip flexion, extension, abduction increased by 10 degrees to restore functional mobility and ADL  ONGOING  3. Pts strength in the hip musculature increased by one grade to facilitate improved active mobility and functional stability  ONGOING  4. Pt will be independent with HEP for self-management. ONGOING   5. Pt to exhibit dynamic stability on the RLE / LLE for stable functional mobility and gait. ONGOING   6. Pt to demonstrate ambulation w/o pain to improve gait pattern with a cane. ONGOING   Plan     Progress Physical Therapy program to assist Pt with managing her lower back   Sx.    Albert Mills, PT

## 2024-09-12 ENCOUNTER — CLINICAL SUPPORT (OUTPATIENT)
Dept: REHABILITATION | Facility: HOSPITAL | Age: 89
End: 2024-09-12
Payer: MEDICARE

## 2024-09-12 DIAGNOSIS — M25.651 IMPAIRED RANGE OF MOTION OF RIGHT HIP: Primary | ICD-10-CM

## 2024-09-12 DIAGNOSIS — R26.89 DECREASED BACK MOBILITY: ICD-10-CM

## 2024-09-12 DIAGNOSIS — M62.9 HAMSTRING TIGHTNESS OF BOTH LOWER EXTREMITIES: ICD-10-CM

## 2024-09-12 PROCEDURE — 97110 THERAPEUTIC EXERCISES: CPT | Mod: PO

## 2024-09-12 PROCEDURE — 97112 NEUROMUSCULAR REEDUCATION: CPT | Mod: PO

## 2024-09-12 NOTE — PROGRESS NOTES
[facility]   Physical Therapy Treatment Note      Name: Gerda Lai  Clinic Number: [id number]    Therapy Diagnosis:   Encounter Diagnoses   Name Primary?    Impaired range of motion of right hip Yes    Hamstring tightness of both lower extremities     Decreased back mobility        Physician: Andi Cisneros,*    Visit Date: 9/12/2024    Physician Orders: PT Eval and Treat right gluteal   Medical Diagnosis from Referral:   Piriformis syndrome of right side [G57.01], Lumbar spondylosis [M47.816], Cervical radiculopathy [M54.12]   Evaluation Date: 8/13/2024  Authorization Period Expiration: 7/19/2025  Plan of Care Expiration: 11/13/2024  Progress Note Due: 9/13/2024  Visit # / Visits authorized: 9 /20       Foto  Date / Visit# Score    #1/3 8/13/2024=1 48%   #2/3       #3/3          Precautions: Standard     Time In: 130 PM  Time Out: 220 PM  Total Appointment Time (timed & untimed codes): 25 minutes 1:1   PTA Visit #: 0/5       Subjective     Patient reports: she is feeling pretty good and there is no pain today, just some soreness  She was not provided with home exercise program at initial evaluation.  Response to previous treatment: did pretty good.   Functional change: ongoing    Pain: 0 / 10  Location: NA    Objective      Objective Measures updated at progress report unless specified.     Treatment     .BOLD INDICATES ACTIVITIES PERFORMED / DISCUSSED      Gerda received the treatments listed below:    Nu-step x 6 minutes      therapeutic exercises to develop strength, endurance, ROM, flexibility, posture, and core stabilization for 20 minutes including:  SUPINE  Lower trunk rotation 10 x 3    PRONE   -sustained extension 3'     SIDE LYING   -open book x12     neuromuscular re-education activities to improve: Balance, Coordination, Kinesthetic, Sense, Proprioception, and Posture for 38 minutes. The following activities were included:    SUPINE   Hook lying B hip abduction 10  "x 3 with yellow oval band   Hook lying B hip adduction hold 10" x 12 ball squeeze   Bridges 10 x 3 with R knee flexed greater than L   Hip flexion with stretch x15  Gluteal sets 10 x 2    SIDE LYING   -open book x12   -clams x20 YOB  -hydrants x20 YOB              PRONE   -leg lift x15    STANDING  -leg swing abd / exten x15 challenged with t12# MB for support   -NB on airex with head turnes   -SL stand (static)  -Tandem balance   manual therapy techniques: Joint mobilizations, Manual traction, and Soft tissue Mobilization were applied to the: lumbar spine for 0 minutes, including:      therapeutic activities to improve functional performance for 00  minutes, including:  +sit to stand   +steps     gait training to improve functional mobility and safety for 00  minutes, including:      Patient Education and Home Exercises       Education provided:   - Pt to continue with her HEP    Written Home Exercises Provided: Yes. Exercises were reviewed and Gerda was able to demonstrate them prior to the end of the session.  Gerda demonstrated good  understanding of the education provided. See Electronic Medical Record under Patient Instructions for exercises provided during therapy sessions    Assessment     Reassessment Needed at next visit by primary PT  Gerda did well with her exercises today and will continue to benefit from LE strengthening and balance exercises.     Gerda Is progressing well towards her goals.   Patient prognosis is Good.     Patient will continue to benefit from skilled outpatient physical therapy to address the deficits listed in the problem list box on initial evaluation, provide pt/family education and to maximize pt's level of independence in the home and community environment.     Patient's spiritual, cultural and educational needs considered and pt agreeable to plan of care and goals.     Anticipated barriers to physical therapy: scheduling     Goals:   Short Term Goals: 5 weeks   1. Pts pain " decreased 20 - 40% for improved functional mobility and quality of life ONGOING  2. Pts PROM in hip flexion and extension increased by 10 degrees to enhance AROM and functional mobility  ONGOING  3. Pts strength in the hip musculature increased by 1/2 grade to facilitate improved active mobility and functional stability ONGOING  4. Pt to exhibit correct return demonstration of exercises for self-management and independence with HEP ONGOING  5. Pt to demonstrate enhanced neuromuscular response  with single leg static balance for improved functional mobility and gait pattern  ONGOING  6. Pt to experience 50% or more reduced interruption of sleep due to reduced pain for improved quality of life. ONGOING     Long Term Goals : 10 weeks   1.  Pts pain level decreased to 75% or greater for with siting for restoring functional mobility and ADL. ONGOING  2. Pts AROM in hip flexion, extension, abduction increased by 10 degrees to restore functional mobility and ADL  ONGOING  3. Pts strength in the hip musculature increased by one grade to facilitate improved active mobility and functional stability  ONGOING  4. Pt will be independent with HEP for self-management. ONGOING   5. Pt to exhibit dynamic stability on the RLE / LLE for stable functional mobility and gait. ONGOING   6. Pt to demonstrate ambulation w/o pain to improve gait pattern with a cane. ONGOING   Plan     Progress Physical Therapy program to assist Pt with managing her lower back   Sx.    Sadia Roque, PT

## 2024-09-13 ENCOUNTER — TELEPHONE (OUTPATIENT)
Dept: PAIN MEDICINE | Facility: CLINIC | Age: 89
End: 2024-09-13
Payer: MEDICARE

## 2024-09-16 ENCOUNTER — CLINICAL SUPPORT (OUTPATIENT)
Dept: REHABILITATION | Facility: HOSPITAL | Age: 89
End: 2024-09-16
Payer: MEDICARE

## 2024-09-16 DIAGNOSIS — R26.89 DECREASED BACK MOBILITY: ICD-10-CM

## 2024-09-16 DIAGNOSIS — M62.9 HAMSTRING TIGHTNESS OF BOTH LOWER EXTREMITIES: ICD-10-CM

## 2024-09-16 DIAGNOSIS — M25.651 IMPAIRED RANGE OF MOTION OF RIGHT HIP: Primary | ICD-10-CM

## 2024-09-16 PROCEDURE — 97112 NEUROMUSCULAR REEDUCATION: CPT | Mod: PO | Performed by: PHYSICAL THERAPIST

## 2024-09-16 NOTE — PROGRESS NOTES
[facility]   Physical Therapy Treatment Note      Name: Gerda Lai  Clinic Number: [id number]    Therapy Diagnosis:   Encounter Diagnoses   Name Primary?    Impaired range of motion of right hip Yes    Hamstring tightness of both lower extremities     Decreased back mobility        Physician: Andi Cisneros,*    Visit Date: 9/16/2024    Physician Orders: PT Eval and Treat right gluteal   Medical Diagnosis from Referral:   Piriformis syndrome of right side [G57.01], Lumbar spondylosis [M47.816], Cervical radiculopathy [M54.12]   Evaluation Date: 8/13/2024  Authorization Period Expiration: 7/19/2025  Plan of Care Expiration: 11/13/2024  Progress Note Due: 9/13/2024  Visit # / Visits authorized: 10 /20       Foto  Date / Visit# Score    #1/3 8/13/2024=1 48%   #2/3 9/16/2024=2      #3/3          Precautions: Standard     Time In: 1510 PM  Time Out: 1610 PM  Total Appointment Time (timed & untimed codes): 35 minutes    PTA Visit #: 0/5       Subjective     Patient reports: she is not experiencing any pain in the right buttocks ans not the back.      She was not provided with home exercise program at initial evaluation.  Response to previous treatment: did pretty good.   Functional change: sitting too long creates pain > 15 min when I try to stand.     Pain: 0 / 10  Location: NA    Objective      Objective Measures updated at progress report unless specified.     Treatment     .BOLD INDICATES ACTIVITIES PERFORMED / DISCUSSED      Gerda received the treatments listed below:    Nu-step x 6 minutes      therapeutic exercises to develop strength, endurance, ROM, flexibility, posture, and core stabilization for 20 minutes including:  SUPINE  Lower trunk rotation 10 x 3    PRONE   -sustained extension 3'     SIDE LYING   -open book x12     neuromuscular re-education activities to improve: Balance, Coordination, Kinesthetic, Sense, Proprioception, and Posture for 38 minutes. The following  "activities were included:    SUPINE   Hook lying B hip abduction 10 x 3 with yellow oval band   Hook lying B hip adduction hold 10" x 12 ball squeeze   Bridges 10 x 3 with R knee flexed greater than L   Hip flexion with stretch x15  Gluteal sets 10 x 2    SIDE LYING   -open book x12   -clams x20 YOB  -hydrants x20 YOB              PRONE   -leg lift x15    STANDING  -leg swing abd / exten x15 challenged with t12# MB for support   -NB on airex with head turnes 30" x 2   -SL stand (static) 30" x 2   -Tandem balance 30" x 2     manual therapy techniques: Joint mobilizations, Manual traction, and Soft tissue Mobilization were applied to the: lumbar spine for 0 minutes, including:      therapeutic activities to improve functional performance for 00  minutes, including:  -sit to stand 2x10  -steps x15 ea     gait training to improve functional mobility and safety for 00  minutes, including:      Patient Education and Home Exercises       Education provided:   - Pt to continue with her HEP    Written Home Exercises Provided: Yes. Exercises were reviewed and Gerda was able to demonstrate them prior to the end of the session.  Gerda demonstrated good  understanding of the education provided. See Electronic Medical Record under Patient Instructions for exercises provided during therapy sessions    Assessment     The patient performed the routine as noted. She was not limited by pain or discomfort. Demonstrated favorable stability. She was not mobility restricted. Progress as tolerated.    Gerda Is progressing well towards her goals.   Patient prognosis is Good.     Patient will continue to benefit from skilled outpatient physical therapy to address the deficits listed in the problem list box on initial evaluation, provide pt/family education and to maximize pt's level of independence in the home and community environment.     Patient's spiritual, cultural and educational needs considered and pt agreeable to plan of care and " goals.     Anticipated barriers to physical therapy: scheduling     Goals:   Short Term Goals: 5 weeks   1. Pts pain decreased 20 - 40% for improved functional mobility and quality of life ONGOING  2. Pts PROM in hip flexion and extension increased by 10 degrees to enhance AROM and functional mobility  ONGOING  3. Pts strength in the hip musculature increased by 1/2 grade to facilitate improved active mobility and functional stability ONGOING  4. Pt to exhibit correct return demonstration of exercises for self-management and independence with HEP ONGOING  5. Pt to demonstrate enhanced neuromuscular response  with single leg static balance for improved functional mobility and gait pattern  ONGOING  6. Pt to experience 50% or more reduced interruption of sleep due to reduced pain for improved quality of life. ONGOING     Long Term Goals : 10 weeks   1.  Pts pain level decreased to 75% or greater for with siting for restoring functional mobility and ADL. ONGOING  2. Pts AROM in hip flexion, extension, abduction increased by 10 degrees to restore functional mobility and ADL  ONGOING  3. Pts strength in the hip musculature increased by one grade to facilitate improved active mobility and functional stability  ONGOING  4. Pt will be independent with HEP for self-management. ONGOING   5. Pt to exhibit dynamic stability on the RLE / LLE for stable functional mobility and gait. ONGOING   6. Pt to demonstrate ambulation w/o pain to improve gait pattern with a cane. ONGOING   Plan     Progress Physical Therapy program to assist Pt with managing her lower back   Sx.    Albert Mills, PT

## 2024-09-17 ENCOUNTER — LAB VISIT (OUTPATIENT)
Dept: LAB | Facility: HOSPITAL | Age: 89
End: 2024-09-17
Payer: MEDICARE

## 2024-09-17 ENCOUNTER — HOSPITAL ENCOUNTER (OUTPATIENT)
Dept: CARDIOLOGY | Facility: HOSPITAL | Age: 89
Discharge: HOME | End: 2024-09-17
Attending: PHYSICIAN ASSISTANT
Payer: MEDICARE

## 2024-09-17 DIAGNOSIS — I65.23 BILATERAL CAROTID ARTERY STENOSIS: Chronic | ICD-10-CM

## 2024-09-17 DIAGNOSIS — I10 HYPERTENSION, ESSENTIAL: Primary | Chronic | ICD-10-CM

## 2024-09-17 DIAGNOSIS — I10 HYPERTENSION, ESSENTIAL: Chronic | ICD-10-CM

## 2024-09-17 LAB
ALBUMIN SERPL BCP-MCNC: 3.8 G/DL (ref 3.5–5.2)
ALP SERPL-CCNC: 60 U/L (ref 55–135)
ALT SERPL W/O P-5'-P-CCNC: 17 U/L (ref 10–44)
ANION GAP SERPL CALC-SCNC: 9 MMOL/L (ref 8–16)
AST SERPL-CCNC: 25 U/L (ref 10–40)
BILIRUB SERPL-MCNC: 1.1 MG/DL (ref 0.1–1)
BUN SERPL-MCNC: 28 MG/DL (ref 10–30)
CALCIUM SERPL-MCNC: 9.2 MG/DL (ref 8.7–10.5)
CHLORIDE SERPL-SCNC: 110 MMOL/L (ref 95–110)
CHOLEST SERPL-MCNC: 152 MG/DL (ref 120–199)
CHOLEST/HDLC SERPL: 3 {RATIO} (ref 2–5)
CO2 SERPL-SCNC: 22 MMOL/L (ref 23–29)
CREAT SERPL-MCNC: 1.2 MG/DL (ref 0.5–1.4)
EST. GFR  (NO RACE VARIABLE): 42.7 ML/MIN/1.73 M^2
GLUCOSE SERPL-MCNC: 90 MG/DL (ref 70–110)
HDLC SERPL-MCNC: 51 MG/DL (ref 40–75)
HDLC SERPL: 33.6 % (ref 20–50)
LDLC SERPL CALC-MCNC: 76 MG/DL (ref 63–159)
LEFT ARM DIASTOLIC BLOOD PRESSURE: 90 MMHG
LEFT ARM SYSTOLIC BLOOD PRESSURE: 238 MMHG
LEFT CBA DIAS: 12 CM/S
LEFT CBA SYS: 119 CM/S
LEFT CCA DIST DIAS: 7 CM/S
LEFT CCA DIST SYS: 81 CM/S
LEFT CCA MID DIAS: 10 CM/S
LEFT CCA MID SYS: 91 CM/S
LEFT CCA PROX DIAS: 12 CM/S
LEFT CCA PROX SYS: 89 CM/S
LEFT ECA DIAS: 8 CM/S
LEFT ECA SYS: 206 CM/S
LEFT ICA DIST DIAS: 21 CM/S
LEFT ICA DIST SYS: 148 CM/S
LEFT ICA MID DIAS: 43 CM/S
LEFT ICA MID SYS: 333 CM/S
LEFT ICA PROX DIAS: 43 CM/S
LEFT ICA PROX SYS: 365 CM/S
LEFT VERTEBRAL DIAS: 12 CM/S
LEFT VERTEBRAL SYS: 58 CM/S
NONHDLC SERPL-MCNC: 101 MG/DL
OHS CV CAROTID RIGHT ICA EDV HIGHEST: 51
OHS CV CAROTID ULTRASOUND LEFT ICA/CCA RATIO: 4.51
OHS CV CAROTID ULTRASOUND RIGHT ICA/CCA RATIO: 4.89
OHS CV PV CAROTID LEFT HIGHEST CCA: 91
OHS CV PV CAROTID LEFT HIGHEST ICA: 365
OHS CV PV CAROTID RIGHT HIGHEST CCA: 92
OHS CV PV CAROTID RIGHT HIGHEST ICA: 352
OHS CV US CAROTID LEFT HIGHEST EDV: 43
POTASSIUM SERPL-SCNC: 4.1 MMOL/L (ref 3.5–5.1)
PROT SERPL-MCNC: 6.2 G/DL (ref 6–8.4)
RIGHT CBA DIAS: 16 CM/S
RIGHT CBA SYS: 251 CM/S
RIGHT CCA DIST DIAS: 7 CM/S
RIGHT CCA DIST SYS: 72 CM/S
RIGHT CCA MID DIAS: 11 CM/S
RIGHT CCA MID SYS: 74 CM/S
RIGHT CCA PROX DIAS: 7 CM/S
RIGHT CCA PROX SYS: 92 CM/S
RIGHT ECA DIAS: 27 CM/S
RIGHT ECA SYS: 377 CM/S
RIGHT ICA DIST DIAS: 18 CM/S
RIGHT ICA DIST SYS: 141 CM/S
RIGHT ICA MID DIAS: 11 CM/S
RIGHT ICA MID SYS: 209 CM/S
RIGHT ICA PROX DIAS: 51 CM/S
RIGHT ICA PROX SYS: 352 CM/S
RIGHT VERTEBRAL DIAS: 14 CM/S
RIGHT VERTEBRAL SYS: 94 CM/S
SODIUM SERPL-SCNC: 141 MMOL/L (ref 136–145)
TRIGL SERPL-MCNC: 125 MG/DL (ref 30–150)

## 2024-09-17 PROCEDURE — 36415 COLL VENOUS BLD VENIPUNCTURE: CPT | Mod: PO | Performed by: PHYSICIAN ASSISTANT

## 2024-09-17 PROCEDURE — 93880 EXTRACRANIAL BILAT STUDY: CPT | Mod: 26,,, | Performed by: INTERNAL MEDICINE

## 2024-09-17 PROCEDURE — 80053 COMPREHEN METABOLIC PANEL: CPT | Performed by: PHYSICIAN ASSISTANT

## 2024-09-17 PROCEDURE — 93880 EXTRACRANIAL BILAT STUDY: CPT | Mod: PO

## 2024-09-17 PROCEDURE — 80061 LIPID PANEL: CPT | Performed by: PHYSICIAN ASSISTANT

## 2024-09-17 RX ORDER — HYDRALAZINE HYDROCHLORIDE 25 MG/1
TABLET, FILM COATED ORAL
Qty: 30 TABLET | Refills: 3 | Status: SHIPPED | OUTPATIENT
Start: 2024-09-17 | End: 2024-09-17

## 2024-09-17 RX ORDER — HYDRALAZINE HYDROCHLORIDE 25 MG/1
25 TABLET, FILM COATED ORAL 2 TIMES DAILY PRN
Qty: 30 TABLET | Refills: 3 | Status: SHIPPED | OUTPATIENT
Start: 2024-09-17

## 2024-09-17 NOTE — NURSING NOTE
Patient here for carotid testing. Patient's blood pressure obtained by sonographer and found to be severely elevated. I rechecked BP and found to be 238/90. Patient reports she has not taken her BP medication today. Notified MENG Schafer who reviewed patient's record. Instructed patient to take aliskiren and labetalol and in the morning and her labetalol again in the evening. In review patient states she has been taking only 1 tablet of labetalol instead of the prescribed 1.5 tablets. Instructed patient to take as prescribed. Hydralazine prn order placed by Holli Lara for patient to take if blood pressure found to be greater than 160. Patient verbalized understanding.

## 2024-09-25 ENCOUNTER — CLINICAL SUPPORT (OUTPATIENT)
Dept: REHABILITATION | Facility: HOSPITAL | Age: 89
End: 2024-09-25
Payer: MEDICARE

## 2024-09-25 DIAGNOSIS — R26.89 DECREASED BACK MOBILITY: ICD-10-CM

## 2024-09-25 DIAGNOSIS — Z00.00 ENCOUNTER FOR MEDICARE ANNUAL WELLNESS EXAM: ICD-10-CM

## 2024-09-25 DIAGNOSIS — M25.651 IMPAIRED RANGE OF MOTION OF RIGHT HIP: Primary | ICD-10-CM

## 2024-09-25 DIAGNOSIS — M62.9 HAMSTRING TIGHTNESS OF BOTH LOWER EXTREMITIES: ICD-10-CM

## 2024-09-25 PROCEDURE — 97112 NEUROMUSCULAR REEDUCATION: CPT | Mod: PO | Performed by: PHYSICAL THERAPIST

## 2024-09-25 PROCEDURE — 97110 THERAPEUTIC EXERCISES: CPT | Mod: PO | Performed by: PHYSICAL THERAPIST

## 2024-09-25 NOTE — PROGRESS NOTES
[facility]   Physical Therapy Treatment Note      Name: Gerda Lai  Clinic Number: [id number]    Therapy Diagnosis:   Encounter Diagnoses   Name Primary?    Impaired range of motion of right hip Yes    Hamstring tightness of both lower extremities     Decreased back mobility        Physician: Andi Cisneros,*    Visit Date: 9/25/2024    Physician Orders: PT Eval and Treat right gluteal   Medical Diagnosis from Referral:   Piriformis syndrome of right side [G57.01], Lumbar spondylosis [M47.816], Cervical radiculopathy [M54.12]   Evaluation Date: 8/13/2024  Authorization Period Expiration: 7/19/2025  Plan of Care Expiration: 11/13/2024  Progress Note Due: 9/13/2024  Visit # / Visits authorized: 11 /20       Foto  Date / Visit# Score    #1/3 8/13/2024=1 48%   #2/3 9/16/2024=2      #3/3          Precautions: Standard     Time In: 1310 PM  Time Out: 1410 PM  Total Appointment Time (timed & untimed codes): 50  minutes    PTA Visit #: 0/5       Subjective     Patient reports: that she does not feel like her head is right today. She reported a significant spike in her BP following her last PT visit. She had an MD appointment the following day.  At the BP check it was significantly elevated. Her medication was altered and an additional drug was added which she took only once. The timing of taking her existing medication was changed. Also changed was how much going from 1 pill to 1 1/2 pills. She thinks the amount of taking Lebetalol  and timeliness of taking Tekturna seems to be the problem. BP prior to coming today was 140 / 50 maybe.     She was not provided with home exercise program at initial evaluation.  Response to previous treatment: did pretty good.   Functional change: sitting too long creates pain > 15 min when I try to stand.     Pain: 0 / 10  Location: NA    Objective       / 68, [place] 61    Treatment     .BOLD INDICATES ACTIVITIES PERFORMED / DISCUSSED      Gerda  "received the treatments listed below:    Nu-step x 7 minutes      therapeutic exercises to develop strength, endurance, ROM, flexibility, posture, and core stabilization for 25 minutes including:  SUPINE  Lower trunk rotation 10 x 3    PRONE   -sustained extension 3'     SIDE LYING   -open book x12     neuromuscular re-education activities to improve: Balance, Coordination, Kinesthetic, Sense, Proprioception, and Posture for 25 minutes. The following activities were included:    SUPINE   Hook lying B hip abduction 10 x 3 with yellow oval band   Hook lying B hip adduction hold 10" x 12 ball squeeze   Bridges 10 x 3 with R knee flexed greater than L   Hip flexion with stretch x15  Gluteal sets 10 x 2    SIDE LYING   -open book x12   -clams x15 YOB  -hydrants x12 YOB              PRONE   -leg lift x15    STANDING  -NB on airex with head turnes 30" x 2   -SL stand (static) 30" x 2   -Tandem balance 30" x 2   -leg swings x15     manual therapy techniques: Joint mobilizations, Manual traction, and Soft tissue Mobilization were applied to the: lumbar spine for 0 minutes, including:      therapeutic activities to improve functional performance for 00  minutes, including:  -sit to stand 2x10  -steps x15 ea     gait training to improve functional mobility and safety for 00  minutes, including:      Patient Education and Home Exercises       Education provided: discussion regarding patients symptoms today and weather she should participate in the exercises today.  Strategy for todays routine discussed.  - Pt to continue with her HEP    Written Home Exercises Provided: Yes. Exercises were reviewed and Gerda was able to demonstrate them prior to the end of the session.  Gerda demonstrated good  understanding of the education provided. See Electronic Medical Record under Patient Instructions for exercises provided during therapy sessions    Assessment     Ms Lorenz completed the above routine without any demonstration of " weakness, difficulty with movements. She did not report dizziness.   She did indicate she was fatigued at the end of the session but in a good way. Progress as tolerated.   Gerda Is progressing well towards her goals.   Patient prognosis is Good.     Patient will continue to benefit from skilled outpatient physical therapy to address the deficits listed in the problem list box on initial evaluation, provide pt/family education and to maximize pt's level of independence in the home and community environment.     Patient's spiritual, cultural and educational needs considered and pt agreeable to plan of care and goals.     Anticipated barriers to physical therapy: scheduling     Goals:   Short Term Goals: 5 weeks   1. Pts pain decreased 20 - 40% for improved functional mobility and quality of life ONGOING  2. Pts PROM in hip flexion and extension increased by 10 degrees to enhance AROM and functional mobility  ONGOING  3. Pts strength in the hip musculature increased by 1/2 grade to facilitate improved active mobility and functional stability ONGOING  4. Pt to exhibit correct return demonstration of exercises for self-management and independence with HEP ONGOING  5. Pt to demonstrate enhanced neuromuscular response  with single leg static balance for improved functional mobility and gait pattern  ONGOING  6. Pt to experience 50% or more reduced interruption of sleep due to reduced pain for improved quality of life. ONGOING     Long Term Goals : 10 weeks   1.  Pts pain level decreased to 75% or greater for with siting for restoring functional mobility and ADL. ONGOING  2. Pts AROM in hip flexion, extension, abduction increased by 10 degrees to restore functional mobility and ADL  ONGOING  3. Pts strength in the hip musculature increased by one grade to facilitate improved active mobility and functional stability  ONGOING  4. Pt will be independent with HEP for self-management. ONGOING   5. Pt to exhibit dynamic  stability on the RLE / LLE for stable functional mobility and gait. ONGOING   6. Pt to demonstrate ambulation w/o pain to improve gait pattern with a cane. ONGOING   Plan     Progress Physical Therapy program to assist Pt with managing her lower back   Sx.    Albert Mills, PT

## 2024-09-27 ENCOUNTER — CLINICAL SUPPORT (OUTPATIENT)
Dept: REHABILITATION | Facility: HOSPITAL | Age: 89
End: 2024-09-27
Payer: MEDICARE

## 2024-09-27 DIAGNOSIS — R26.89 DECREASED BACK MOBILITY: ICD-10-CM

## 2024-09-27 DIAGNOSIS — M25.651 IMPAIRED RANGE OF MOTION OF RIGHT HIP: Primary | ICD-10-CM

## 2024-09-27 DIAGNOSIS — M62.9 HAMSTRING TIGHTNESS OF BOTH LOWER EXTREMITIES: ICD-10-CM

## 2024-09-27 PROCEDURE — 97110 THERAPEUTIC EXERCISES: CPT | Mod: PO | Performed by: PHYSICAL THERAPIST

## 2024-09-27 PROCEDURE — 97112 NEUROMUSCULAR REEDUCATION: CPT | Mod: PO | Performed by: PHYSICAL THERAPIST

## 2024-09-27 NOTE — PROGRESS NOTES
[facility]   Physical Therapy Treatment Note      Name: Gerda Lai  Clinic Number: [id number]    Therapy Diagnosis:   Encounter Diagnoses   Name Primary?    Impaired range of motion of right hip Yes    Hamstring tightness of both lower extremities     Decreased back mobility        Physician: Andi Cisneros,*    Visit Date: 9/27/2024    Physician Orders: PT Eval and Treat right gluteal   Medical Diagnosis from Referral:   Piriformis syndrome of right side [G57.01], Lumbar spondylosis [M47.816], Cervical radiculopathy [M54.12]   Evaluation Date: 8/13/2024  Authorization Period Expiration: 7/19/2025  Plan of Care Expiration: 11/13/2024  Progress Note Due: 9/13/2024  Visit # / Visits authorized: 12 /20       Foto  Date / Visit# Score    #1/3 8/13/2024=1 48%   #2/3 9/16/2024=2   56%   #3/3          Precautions: Standard     Time In: 1205 PM  Time Out: 1310 PM  Total Appointment Time (timed & untimed codes): 65  minutes    PTA Visit #: 0/5       Subjective     Patient reports: feeling better. Meds adjusted.  She was not provided with home exercise program at initial evaluation.  Response to previous treatment: did pretty good.   Functional change: sitting too long creates pain > 15 min when I try to stand.     Pain: 0 / 10  Location: NA    Objective       / 68, [place] 61    Treatment     .BOLD INDICATES ACTIVITIES PERFORMED / DISCUSSED      Gerda received the treatments listed below:    Nu-step x 7 minutes      therapeutic exercises to develop strength, endurance, ROM, flexibility, posture, and core stabilization for 15 minutes including:  SUPINE  Lower trunk rotation 10 x 3    PRONE   -sustained extension 3'     SIDE LYING   -open book x12     neuromuscular re-education activities to improve: Balance, Coordination, Kinesthetic, Sense, Proprioception, and Posture for 45 minutes. The following activities were included:    SUPINE   Hook lying B hip abduction 10 x 3 with yellow oval  "band     Bridges 10 x 3 with R knee flexed greater than L   Hip flexion with stretch x15  Gluteal / quad  sets / ball squeeze  hold 7" x 12     SIDE LYING   -open book x12   -clams x30 YOB  -hydrants x30 YOB              PRONE   -leg lift x15    STANDING  -NB on airex with head turnes 30" x 2   -SL stand (static) 30" x 2   -Tandem balance 30" x 2   -leg swings x15     manual therapy techniques: Joint mobilizations, Manual traction, and Soft tissue Mobilization were applied to the: lumbar spine for 0 minutes, including:      therapeutic activities to improve functional performance for 00  minutes, including:  -squats   -sit to stand 2x10  -steps x15 ea     gait training to improve functional mobility and safety for 00  minutes, including:      Patient Education and Home Exercises       Education provided: discussion regarding patients symptoms today and weather she should participate in the exercises today.  Strategy for todays routine discussed.  - Pt to continue with her HEP    Written Home Exercises Provided: Yes. Exercises were reviewed and Gerda was able to demonstrate them prior to the end of the session.  Gerda demonstrated good  understanding of the education provided. See Electronic Medical Record under Patient Instructions for exercises provided during therapy sessions    Assessment     The patient performed the routine noted. She completed the exercises without exhibiting significant fatigue although her endurance is decreased. Will resume therapeutic activities next session. Progress with walking and other CC activities including balance.       Gerda Is progressing well towards her goals.   Patient prognosis is Good.     Patient will continue to benefit from skilled outpatient physical therapy to address the deficits listed in the problem list box on initial evaluation, provide pt/family education and to maximize pt's level of independence in the home and community environment.     Patient's spiritual, " cultural and educational needs considered and pt agreeable to plan of care and goals.     Anticipated barriers to physical therapy: scheduling     Goals:   Short Term Goals: 5 weeks   1. Pts pain decreased 20 - 40% for improved functional mobility and quality of life ONGOING  2. Pts PROM in hip flexion and extension increased by 10 degrees to enhance AROM and functional mobility  ONGOING  3. Pts strength in the hip musculature increased by 1/2 grade to facilitate improved active mobility and functional stability ONGOING  4. Pt to exhibit correct return demonstration of exercises for self-management and independence with HEP ONGOING  5. Pt to demonstrate enhanced neuromuscular response  with single leg static balance for improved functional mobility and gait pattern  ONGOING  6. Pt to experience 50% or more reduced interruption of sleep due to reduced pain for improved quality of life. ONGOING     Long Term Goals : 10 weeks   1.  Pts pain level decreased to 75% or greater for with siting for restoring functional mobility and ADL. ONGOING  2. Pts AROM in hip flexion, extension, abduction increased by 10 degrees to restore functional mobility and ADL  ONGOING  3. Pts strength in the hip musculature increased by one grade to facilitate improved active mobility and functional stability  ONGOING  4. Pt will be independent with HEP for self-management. ONGOING   5. Pt to exhibit dynamic stability on the RLE / LLE for stable functional mobility and gait. ONGOING   6. Pt to demonstrate ambulation w/o pain to improve gait pattern with a cane. ONGOING   Plan     Progress Physical Therapy program to assist Pt with managing her lower back   Sx.    Albert Mills, PT

## 2024-09-28 ENCOUNTER — PATIENT MESSAGE (OUTPATIENT)
Dept: INTERNAL MEDICINE | Facility: CLINIC | Age: 89
End: 2024-09-28
Payer: MEDICARE

## 2024-09-30 ENCOUNTER — CLINICAL SUPPORT (OUTPATIENT)
Dept: REHABILITATION | Facility: HOSPITAL | Age: 89
End: 2024-09-30
Payer: MEDICARE

## 2024-09-30 DIAGNOSIS — M62.9 HAMSTRING TIGHTNESS OF BOTH LOWER EXTREMITIES: ICD-10-CM

## 2024-09-30 DIAGNOSIS — R26.89 DECREASED BACK MOBILITY: ICD-10-CM

## 2024-09-30 DIAGNOSIS — M25.651 IMPAIRED RANGE OF MOTION OF RIGHT HIP: Primary | ICD-10-CM

## 2024-09-30 PROCEDURE — 97530 THERAPEUTIC ACTIVITIES: CPT | Mod: PO | Performed by: PHYSICAL THERAPIST

## 2024-09-30 PROCEDURE — 97112 NEUROMUSCULAR REEDUCATION: CPT | Mod: PO | Performed by: PHYSICAL THERAPIST

## 2024-09-30 NOTE — PROGRESS NOTES
[facility]   Physical Therapy Treatment Note      Name: Gerda Lai  Clinic Number: [id number]    Therapy Diagnosis:   Encounter Diagnoses   Name Primary?    Impaired range of motion of right hip Yes    Hamstring tightness of both lower extremities     Decreased back mobility        Physician: Andi Cisneros,*    Visit Date: 9/30/2024    Physician Orders: PT Eval and Treat right gluteal   Medical Diagnosis from Referral:   Piriformis syndrome of right side [G57.01], Lumbar spondylosis [M47.816], Cervical radiculopathy [M54.12]   Evaluation Date: 8/13/2024  Authorization Period Expiration: 7/19/2025  Plan of Care Expiration: 11/13/2024  Progress Note Due: 9/13/2024  Visit # / Visits authorized: 13 /20       Foto  Date / Visit# Score    #1/3 8/13/2024=1 48%   #2/3 9/16/2024=2   56%   #3/3          Precautions: Standard     Time In: 1410 PM  Time Out: 1510 PM  Total Appointment Time (timed & untimed codes): 65  minutes    PTA Visit #: 0/5       Subjective     Patient reports: she is feeling fine today.There is some pain in the buttocks when I sit too long. After walking it goes away.   She was not provided with home exercise program at initial evaluation.  Response to previous treatment: did pretty good.   Functional change: sitting too long creates pain > 15 min when I try to stand.     Pain: 0 / 10  Location: NA    Objective       / 68, [place] 61    Treatment     .BOLD INDICATES ACTIVITIES PERFORMED / DISCUSSED      Gerda received the treatments listed below:    Nu-step x 7 minutes      therapeutic exercises to develop strength, endurance, ROM, flexibility, posture, and core stabilization for 15 minutes including:  SUPINE  Lower trunk rotation 10 x 3    PRONE   -sustained extension 3'     SIDE LYING   -open book x12     neuromuscular re-education activities to improve: Balance, Coordination, Kinesthetic, Sense, Proprioception, and Posture for 45 minutes. The following  "activities were included:    SUPINE   Hook lying B hip abduction 10 x 3 with yellow oval band     Bridges 10 x 3 with R knee flexed greater than L   Hip flexion with stretch x15  Gluteal / quad  sets / ball squeeze  hold 7" x 12     SIDE LYING   -open book x12   -clams x30 YOB  -hydrants x30 YOB              PRONE   -leg lift x15    STANDING  -NB on airex with head turnes 30" x 2   -SL stand (static) 30" x 2   -Tandem balance 30" x 2   -leg swings x15     manual therapy techniques: Joint mobilizations, Manual traction, and Soft tissue Mobilization were applied to the: lumbar spine for 0 minutes, including:      therapeutic activities to improve functional performance for 00  minutes, including:  -squats   -sit to stand 2x10  -steps x15 ea   -ambulation x1 around the clinic w/RW     gait training to improve functional mobility and safety for 00  minutes, including:      Patient Education and Home Exercises       Education provided: discussion regarding patients symptoms today and weather she should participate in the exercises today.  Strategy for todays routine discussed.  - Pt to continue with her HEP    Written Home Exercises Provided: Yes. Exercises were reviewed and Gerda was able to demonstrate them prior to the end of the session.  Gerda demonstrated good  understanding of the education provided. See Electronic Medical Record under Patient Instructions for exercises provided during therapy sessions    Assessment     The patient completed the routine as noted. She did not experience any limited mobility due to pain. She ws able to tolerate ambulating around the clinic x1. Focus on balance and stability. Progress with CC activities. Add elizabeth step overs.      Gerda Is progressing well towards her goals.   Patient prognosis is Good.     Patient will continue to benefit from skilled outpatient physical therapy to address the deficits listed in the problem list box on initial evaluation, provide pt/family " education and to maximize pt's level of independence in the home and community environment.     Patient's spiritual, cultural and educational needs considered and pt agreeable to plan of care and goals.     Anticipated barriers to physical therapy: scheduling     Goals:   Short Term Goals: 5 weeks   1. Pts pain decreased 20 - 40% for improved functional mobility and quality of life ONGOING  2. Pts PROM in hip flexion and extension increased by 10 degrees to enhance AROM and functional mobility  ONGOING  3. Pts strength in the hip musculature increased by 1/2 grade to facilitate improved active mobility and functional stability ONGOING  4. Pt to exhibit correct return demonstration of exercises for self-management and independence with HEP ONGOING  5. Pt to demonstrate enhanced neuromuscular response  with single leg static balance for improved functional mobility and gait pattern  ONGOING  6. Pt to experience 50% or more reduced interruption of sleep due to reduced pain for improved quality of life. ONGOING     Long Term Goals : 10 weeks   1.  Pts pain level decreased to 75% or greater for with siting for restoring functional mobility and ADL. ONGOING  2. Pts AROM in hip flexion, extension, abduction increased by 10 degrees to restore functional mobility and ADL  ONGOING  3. Pts strength in the hip musculature increased by one grade to facilitate improved active mobility and functional stability  ONGOING  4. Pt will be independent with HEP for self-management. ONGOING   5. Pt to exhibit dynamic stability on the RLE / LLE for stable functional mobility and gait. ONGOING   6. Pt to demonstrate ambulation w/o pain to improve gait pattern with a cane. ONGOING   Plan     Progress Physical Therapy program to assist Pt with managing her lower back   Sx.    Albert Mills, PT

## 2024-10-02 ENCOUNTER — CLINICAL SUPPORT (OUTPATIENT)
Dept: REHABILITATION | Facility: HOSPITAL | Age: 89
End: 2024-10-02
Payer: MEDICARE

## 2024-10-02 DIAGNOSIS — M25.651 IMPAIRED RANGE OF MOTION OF RIGHT HIP: Primary | ICD-10-CM

## 2024-10-02 DIAGNOSIS — M62.9 HAMSTRING TIGHTNESS OF BOTH LOWER EXTREMITIES: ICD-10-CM

## 2024-10-02 DIAGNOSIS — R26.89 DECREASED BACK MOBILITY: ICD-10-CM

## 2024-10-02 PROCEDURE — 97112 NEUROMUSCULAR REEDUCATION: CPT | Mod: PO | Performed by: PHYSICAL THERAPIST

## 2024-10-02 NOTE — PROGRESS NOTES
[facility]   Physical Therapy Treatment Note      Name: Gerda Lai  Clinic Number: [id number]    Therapy Diagnosis:   Encounter Diagnoses   Name Primary?    Impaired range of motion of right hip Yes    Hamstring tightness of both lower extremities     Decreased back mobility        Physician: Andi Cisneros,*    Visit Date: 10/2/2024    Physician Orders: PT Eval and Treat right gluteal   Medical Diagnosis from Referral:   Piriformis syndrome of right side [G57.01], Lumbar spondylosis [M47.816], Cervical radiculopathy [M54.12]   Evaluation Date: 8/13/2024  Authorization Period Expiration: 7/19/2025  Plan of Care Expiration: 11/13/2024  Progress Note Due: 9/13/2024  Visit # / Visits authorized: 14 /20       Foto  Date / Visit# Score    #1/3 8/13/2024=1 48%   #2/3 9/16/2024=2   56%   #3/3          Precautions: Standard     Time In: 1410 PM  Time Out: 1515  PM  Total Appointment Time (timed & untimed codes): 65  minutes    PTA Visit #: 0/5       Subjective     Patient reports: she is feeling fine today.    She was not provided with home exercise program at initial evaluation.  Response to previous treatment: did pretty good.   Functional change: sitting too long creates pain > 15 min when I try to stand.     Pain: 0 / 10  Location: NA    Objective       / 68, SD 61    Treatment     .BOLD INDICATES ACTIVITIES PERFORMED / DISCUSSED      Gerda received the treatments listed below:    Nu-step x 7 minutes  Recumbent bike 7'     therapeutic exercises to develop strength, endurance, ROM, flexibility, posture, and core stabilization for 15 minutes including:  SUPINE  Lower trunk rotation 10 x 3    PRONE   -sustained extension 3'     SIDE LYING       neuromuscular re-education activities to improve: Balance, Coordination, Kinesthetic, Sense, Proprioception, and Posture for 45 minutes. The following activities were included:    SUPINE   Hook lying B hip abduction 10 x 3 with yellow  "oval band     Bridges 10 x 3 with R knee flexed greater than L   Hip flexion with stretch x15  Gluteal / quad  sets / ball squeeze  hold 10" x 15     SIDE LYING   -open book x12   -clams x30 YOB  -hydrants x30 YOB              PRONE   -leg lift x15    STANDING  -NB on airex with head turnes 30" x 2   -SL stand (static) 20" x 3   -Tandem balance 20" x 3   -leg swings x15     manual therapy techniques: Joint mobilizations, Manual traction, and Soft tissue Mobilization were applied to the: lumbar spine for 0 minutes, including:      therapeutic activities to improve functional performance for 00  minutes, including:  -squats   -sit to stand 2x10  -steps x15 ea   -ambulation x1 around the clinic w/RW     gait training to improve functional mobility and safety for 00  minutes, including:      Patient Education and Home Exercises       Education provided: discussion regarding patients symptoms today and weather she should participate in the exercises today.  Strategy for todays routine discussed.  - Pt to continue with her HEP    Written Home Exercises Provided: Yes. Exercises were reviewed and Gerda was able to demonstrate them prior to the end of the session.  Gerda demonstrated good  understanding of the education provided. See Electronic Medical Record under Patient Instructions for exercises provided during therapy sessions    Assessment     Gerda performed the activities noted above.  She reproduced the exercises without significant direction. Pain is not a factor. Demonstrated satisfactory balance strategies SL and Tandem on a stable surface. Progress with therapeutic activities.     Gerda Is progressing well towards her goals.   Patient prognosis is Good.     Patient will continue to benefit from skilled outpatient physical therapy to address the deficits listed in the problem list box on initial evaluation, provide pt/family education and to maximize pt's level of independence in the home and community " environment.     Patient's spiritual, cultural and educational needs considered and pt agreeable to plan of care and goals.     Anticipated barriers to physical therapy: scheduling     Goals:   Short Term Goals: 5 weeks   1. Pts pain decreased 20 - 40% for improved functional mobility and quality of life ONGOING  2. Pts PROM in hip flexion and extension increased by 10 degrees to enhance AROM and functional mobility  ONGOING  3. Pts strength in the hip musculature increased by 1/2 grade to facilitate improved active mobility and functional stability ONGOING  4. Pt to exhibit correct return demonstration of exercises for self-management and independence with HEP ONGOING  5. Pt to demonstrate enhanced neuromuscular response  with single leg static balance for improved functional mobility and gait pattern  ONGOING  6. Pt to experience 50% or more reduced interruption of sleep due to reduced pain for improved quality of life. ONGOING     Long Term Goals : 10 weeks   1.  Pts pain level decreased to 75% or greater for with siting for restoring functional mobility and ADL. ONGOING  2. Pts AROM in hip flexion, extension, abduction increased by 10 degrees to restore functional mobility and ADL  ONGOING  3. Pts strength in the hip musculature increased by one grade to facilitate improved active mobility and functional stability  ONGOING  4. Pt will be independent with HEP for self-management. ONGOING   5. Pt to exhibit dynamic stability on the RLE / LLE for stable functional mobility and gait. ONGOING   6. Pt to demonstrate ambulation w/o pain to improve gait pattern with a cane. ONGOING   Plan     Progress Physical Therapy program to assist Pt with managing her lower back   Sx.    Albert Mills, PT

## 2024-10-07 ENCOUNTER — CLINICAL SUPPORT (OUTPATIENT)
Dept: REHABILITATION | Facility: HOSPITAL | Age: 89
End: 2024-10-07
Payer: MEDICARE

## 2024-10-07 DIAGNOSIS — M25.651 IMPAIRED RANGE OF MOTION OF RIGHT HIP: Primary | ICD-10-CM

## 2024-10-07 DIAGNOSIS — M62.9 HAMSTRING TIGHTNESS OF BOTH LOWER EXTREMITIES: ICD-10-CM

## 2024-10-07 DIAGNOSIS — R26.89 DECREASED BACK MOBILITY: ICD-10-CM

## 2024-10-07 PROCEDURE — 97530 THERAPEUTIC ACTIVITIES: CPT | Mod: PO | Performed by: PHYSICAL THERAPIST

## 2024-10-07 PROCEDURE — 97112 NEUROMUSCULAR REEDUCATION: CPT | Mod: PO | Performed by: PHYSICAL THERAPIST

## 2024-10-07 NOTE — PROGRESS NOTES
[facility]   Physical Therapy Treatment Note      Name: Gerda Lai  Clinic Number: [id number]    Therapy Diagnosis:   Encounter Diagnoses   Name Primary?    Impaired range of motion of right hip Yes    Hamstring tightness of both lower extremities     Decreased back mobility        Physician: Andi Cisneros,*    Visit Date: 10/7/2024    Physician Orders: PT Eval and Treat right gluteal   Medical Diagnosis from Referral:   Piriformis syndrome of right side [G57.01], Lumbar spondylosis [M47.816], Cervical radiculopathy [M54.12]   Evaluation Date: 8/13/2024  Authorization Period Expiration: 7/19/2025  Plan of Care Expiration: 11/13/2024  Progress Note Due: 9/13/2024  Visit # / Visits authorized: 15  /20       Foto  Date / Visit# Score    #1/3 8/13/2024=1 48%   #2/3 9/16/2024=2   56%   #3/3          Precautions: Standard     Time In: 1410   Time Out: 1515    Total Appointment Time (timed & untimed codes): 65  minutes    PTA Visit #: 0/5       Subjective     Patient reports: she is feeling a little off today but no pain in the knee or back.   She was not provided with home exercise program at initial evaluation.  Response to previous treatment: did pretty good.   Functional change: sitting too long creates pain > 15 min when I try to stand.     Pain: 0 / 10  Location: NA    Objective       / 68, AK 61    Treatment     .BOLD INDICATES ACTIVITIES PERFORMED / DISCUSSED      Gerda received the treatments listed below:    Nu-step x 7 minutes  Recumbent bike 7'     therapeutic exercises to develop strength, endurance, ROM, flexibility, posture, and core stabilization for 15 minutes including:  SUPINE  Lower trunk rotation 10 x2    PRONE   -sustained extension 3'     SIDE LYING       neuromuscular re-education activities to improve: Balance, Coordination, Kinesthetic, Sense, Proprioception, and Posture for 35 minutes. The following activities were included:    SUPINE   Hook lying B  "hip abduction 10 x 3 with yellow oval band     Bridges 10 x 3 with R knee flexed greater than L   Hip flexion with stretch x15  Gluteal / quad  sets / ball squeeze  hold 10" x 15     SIDE LYING   -open book x15   -clams x30 YOB  -hydrants x30 YOB              PRONE   -leg lift x15    STANDING  -NB on airex with head turnes 30" x 2   -SL stand (static) 20" x 3   -Tandem balance 20" x 3   -leg swings x15     manual therapy techniques: Joint mobilizations, Manual traction, and Soft tissue Mobilization were applied to the: lumbar spine for 0 minutes, including:      therapeutic activities to improve functional performance for  15 minutes, including:  -squats x20  -sit to stand 2x10  -steps x15 ea   -ambulation x1 around the clinic w/RW     gait training to improve functional mobility and safety for 00  minutes, including:      Patient Education and Home Exercises       Education provided: discussion regarding patients symptoms today and weather she should participate in the exercises today.  Strategy for todays routine discussed.  - Pt to continue with her HEP    Written Home Exercises Provided: Yes. Exercises were reviewed and Gerda was able to demonstrate them prior to the end of the session.  Gerda demonstrated good  understanding of the education provided. See Electronic Medical Record under Patient Instructions for exercises provided during therapy sessions    Assessment       Gerda Is progressing well towards her goals.   Patient prognosis is Good.     Patient will continue to benefit from skilled outpatient physical therapy to address the deficits listed in the problem list box on initial evaluation, provide pt/family education and to maximize pt's level of independence in the home and community environment.     Patient's spiritual, cultural and educational needs considered and pt agreeable to plan of care and goals.     Anticipated barriers to physical therapy: scheduling     Goals:   Short Term Goals: 5 " weeks   1. Pts pain decreased 20 - 40% for improved functional mobility and quality of life ONGOING  2. Pts PROM in hip flexion and extension increased by 10 degrees to enhance AROM and functional mobility  ONGOING  3. Pts strength in the hip musculature increased by 1/2 grade to facilitate improved active mobility and functional stability ONGOING  4. Pt to exhibit correct return demonstration of exercises for self-management and independence with HEP ONGOING  5. Pt to demonstrate enhanced neuromuscular response  with single leg static balance for improved functional mobility and gait pattern  ONGOING  6. Pt to experience 50% or more reduced interruption of sleep due to reduced pain for improved quality of life. ONGOING     Long Term Goals : 10 weeks   1.  Pts pain level decreased to 75% or greater for with siting for restoring functional mobility and ADL. ONGOING  2. Pts AROM in hip flexion, extension, abduction increased by 10 degrees to restore functional mobility and ADL  ONGOING  3. Pts strength in the hip musculature increased by one grade to facilitate improved active mobility and functional stability  ONGOING  4. Pt will be independent with HEP for self-management. ONGOING   5. Pt to exhibit dynamic stability on the RLE / LLE for stable functional mobility and gait. ONGOING   6. Pt to demonstrate ambulation w/o pain to improve gait pattern with a cane. ONGOING   Plan     Progress Physical Therapy program to assist Pt with managing her lower back   Sx.    Albert Mills, PT

## 2024-10-09 ENCOUNTER — OFFICE VISIT (OUTPATIENT)
Dept: INTERNAL MEDICINE | Facility: CLINIC | Age: 89
End: 2024-10-09
Payer: MEDICARE

## 2024-10-09 ENCOUNTER — HOSPITAL ENCOUNTER (OUTPATIENT)
Dept: RADIOLOGY | Facility: HOSPITAL | Age: 89
Discharge: HOME | End: 2024-10-09
Attending: INTERNAL MEDICINE
Payer: MEDICARE

## 2024-10-09 VITALS
SYSTOLIC BLOOD PRESSURE: 172 MMHG | TEMPERATURE: 97 F | OXYGEN SATURATION: 96 % | HEIGHT: 63 IN | HEART RATE: 40 BPM | BODY MASS INDEX: 23.79 KG/M2 | DIASTOLIC BLOOD PRESSURE: 70 MMHG | WEIGHT: 134.25 LBS

## 2024-10-09 DIAGNOSIS — I10 ESSENTIAL (PRIMARY) HYPERTENSION: Primary | ICD-10-CM

## 2024-10-09 DIAGNOSIS — R19.5 ABNORMAL STOOLS: ICD-10-CM

## 2024-10-09 DIAGNOSIS — Z23 NEED FOR VACCINATION: ICD-10-CM

## 2024-10-09 PROCEDURE — 99214 OFFICE O/P EST MOD 30 MIN: CPT | Mod: S$PBB,,, | Performed by: INTERNAL MEDICINE

## 2024-10-09 PROCEDURE — G2211 COMPLEX E/M VISIT ADD ON: HCPCS | Mod: S$PBB,,, | Performed by: INTERNAL MEDICINE

## 2024-10-09 PROCEDURE — 99999PBSHW FLU VACCINE - ADJUVANTED: Mod: PBBFAC,,,

## 2024-10-09 PROCEDURE — 74018 RADEX ABDOMEN 1 VIEW: CPT | Mod: TC,PO

## 2024-10-09 PROCEDURE — 99213 OFFICE O/P EST LOW 20 MIN: CPT | Mod: PBBFAC,25,PO | Performed by: INTERNAL MEDICINE

## 2024-10-09 PROCEDURE — 99999 PR PBB SHADOW E&M-EST. PATIENT-LVL III: CPT | Mod: PBBFAC,,, | Performed by: INTERNAL MEDICINE

## 2024-10-09 PROCEDURE — 74018 RADEX ABDOMEN 1 VIEW: CPT | Mod: 26,,, | Performed by: RADIOLOGY

## 2024-10-09 PROCEDURE — 90653 IIV ADJUVANT VACCINE IM: CPT | Mod: PBBFAC,PO

## 2024-10-09 PROCEDURE — G0008 ADMIN INFLUENZA VIRUS VAC: HCPCS | Mod: PBBFAC,PO

## 2024-10-09 NOTE — PROGRESS NOTES
"CC: followup of hypertension  HPI:  The patient is a 89 y.o. year old female who presents to the office for followup of hypertension.        HYPERTENSION:  She reports a recent spike in blood pressure readings, with a recorded measurement of 224/80. Prior to the appointment, her blood pressure was 166/66. She was prescribed hydralazine for blood pressure spikes, with instructions to take it when readings exceed 160. However, she reports that the medication did not effectively lower her blood pressure as expected. She continues labetalol 1.5 pills twice daily, which has been effective in controlling her blood pressure.    GASTROINTESTINAL ISSUES:  She reports changes in bowel movements over the past 1-1.5 weeks. She describes unusual stool appearance, noting small, squishy-looking excretions that are atypical for her. She expresses difficulty with elimination, stating it is "a job to eliminate" despite not feeling constipated. She denies abdominal pain. She reports taking Metamucil daily for fiber and eating a few slices of apple, which she finds helpful. However, she still experiences straining during bowel movements.    MEDICAL HISTORY:  She has a history of partial thyroidectomy performed in her 30s. She also has neck arthritis, primarily on the left side but now affecting the right side as well. She is currently under the care of pain management at [place] for this condition.    MEDICATIONS:  Current medications include: Vitamin D, Turmeric, Metamucil (daily), Pravastatin, Prilosec (OTC), Multivitamin, Lorazepam (daily for sleep), Labetalol (1.5 pills twice daily), Hydralazine, Glucosamine chondroitin, Azetia, Coenzyme Q10, Aspirin 81 mg, Tecturna, and Tylenol as needed.    ALLERGIES:  She reports multiple medication allergies, including: Sulfa, Clarithromycin, Flexeril, Iodine, Lisinopril, Losartan, Metoprolol, Spironolactone, Tramadol, Verapamil, and Voltaren.    SOCIAL HISTORY:  She is a non-smoker. She " reports eating out frequently, recently consuming oysters and fish at a restaurant, and tacos provided by her neighbor.    FUNCTIONAL STATUS:  She reports experiencing fatigue with exertion, particularly when attempting to complete household chores. She notes difficulty in performing these tasks due to increased tiredness.      ROS:  Constitutional: +fatigue, -change in weight  Gastrointestinal: -abdominal pain       PAST MEDICAL HISTORY:  Past Medical History:   Diagnosis Date    Anxiety     Arthritis     Basal cell carcinoma 09/2016    right post auricular neck     Basal cell carcinoma of right forearm 04/19/2023    R lower forearm    Breast cancer 1992    right    Cataract     Fibromyalgia     History of measles as a child     Hyperlipidemia     Hypertension     Personal history of colonic polyps     Pneumonia     SCC (squamous cell carcinoma) 2015    R chest    SCC (squamous cell carcinoma) 2016    left medial shoulder    SCC (squamous cell carcinoma) 2017    left knee    SCC (squamous cell carcinoma) 2022    L upper chest, R upper arm KA types    Shingles     Squamous cell carcinoma 2015    right forearm    Thyroid disease     Vaginitis        SURGICAL HISTORY:  Past Surgical History:   Procedure Laterality Date    ADENOIDECTOMY      ARTHROPLASTY, KNEE, TOTAL, USING COMPUTER-ASSISTED NAVIGATION Right 5/22/2023    Procedure: ARTHROPLASTY, KNEE, TOTAL, USING COMPUTER-ASSISTED NAVIGATION: QUYEN: RIGHT: MARTHA - IDANIA;  Surgeon: Milton Cobian III, MD;  Location: [facility] OR;  Service: Orthopedics;  Laterality: Right;    BREAST BIOPSY Left     Excisional bx, benign    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    CARPAL TUNNEL RELEASE Right 3/16/2021    Procedure: RELEASE, CARPAL TUNNEL;  Surgeon: Barbara Aldridge MD;  Location: [facility] OR;  Service: Orthopedics;  Laterality: Right;    CATARACT EXTRACTION W/  INTRAOCULAR LENS IMPLANT Bilateral     EPIDURAL STEROID INJECTION N/A 2/24/2023    Procedure: HEIDI C7-T1;   Surgeon: Andi Cisneros DO;  Location: [facility] OR;  Service: Pain Management;  Laterality: N/A;    EPIDURAL STEROID INJECTION INTO CERVICAL SPINE N/A 6/10/2024    Procedure: C7-T1 HEIDI (toward the left);  Surgeon: Andi Cisneros DO;  Location: [facility];  Service: Pain Management;  Laterality: N/A;  RN sed w/Versed and Benadryl    EYE SURGERY      HAND SURGERY      INJECTION OF ANESTHETIC AGENT AROUND MEDIAL BRANCH NERVES INNERVATING CERVICAL FACET JOINT N/A 1/4/2022    Procedure: Cervical Medial Branch Block C5-6, C6-7 (No Sedation);  Surgeon: Himanshu Blackmon Jr., MD;  Location: [facility];  Service: Pain Management;  Laterality: N/A;    INJECTION OF ANESTHETIC AGENT AROUND MEDIAL BRANCH NERVES INNERVATING LUMBAR FACET JOINT Left 11/18/2021    Procedure: Cervical Medial Branch Block Left C5-6, C6-7 (IV Sedation);  Surgeon: Himanshu Blackmon Jr., MD;  Location: [facility] PAIN T;  Service: Pain Management;  Laterality: Left;    JOINT REPLACEMENT Right     ELZBIETA    KNEE ARTHROPLASTY Left 8/10/2020    Procedure: ARTHROPLASTY, KNEE-ADE NEXGEN/CEMENTED TIBIA;  Surgeon: Kalin Pacheco MD;  Location: [facility] OR;  Service: Orthopedics;  Laterality: Left;    RADIOFREQUENCY THERMAL COAGULATION OF MEDIAL BRANCH OF POSTERIOR RAMUS OF CERVICAL SPINAL NERVE Left 3/8/2022    Procedure: RADIOFREQUENCY THERMAL COAGULATION, NERVE, SPINAL, CERVICAL, LEFT C5-6 AND C6-7;  Surgeon: Himanshu Blackmon Jr., MD;  Location: [facility] PAIN T;  Service: Pain Management;  Laterality: Left;  NO PACEMAKER   ASA    thyriodectomy      partial    tonsillectomy      TONSILLECTOMY      TOTAL HIP ARTHROPLASTY      right    Yag  Left        MEDS:  Medcard reviewed and updated    ALLERGIES: Allergy Card reviewed and updated    SOCIAL HISTORY:   The patient is a nonsmoker.    PE:   APPEARANCE: Well nourished, well developed, in no acute distress.    CHEST: Lungs clear to auscultation with unlabored respirations.  CARDIOVASCULAR:  Normal S1, S2. No murmurs. No carotid bruits. No pedal edema.  ABDOMEN: Bowel sounds normal. Not distended. Soft. No tenderness or masses.   PSYCHIATRIC: The patient is oriented to person, place, and time and has a pleasant affect.        ASSESSMENT/PLAN:  Gerda was seen today for hypertension.    Diagnoses and all orders for this visit:    Essential (primary) hypertension  -     TSH; Future  -     Basic Metabolic Panel; Future  -     blood pressure is elevated, but improving  -     keep follow-up appointment scheduled with Cardiology    Abnormal stools  -     X-Ray Abdomen AP 1 View; Future    Need for vaccination  -     Influenza - Trivalent (Adjuvanted)        Assessed blood pressure fluctuations, noting recent spike to 224/80  Considered potential causes for GI symptoms, including thyroid dysfunction and retained stool  Evaluated kidney function and electrolyte balance as potential factors affecting blood pressure  Reviewed current medication regimen, including labetalol and hydralazine, for blood pressure management    HYPERTENSION:  - Gerda to monitor blood pressure, especially if feeling unwell.  - Gerda to avoid excessive blood pressure checks to prevent anxiety-induced elevations.  - Recommend maintaining adequate hydration with water.  - Gerda to limit caffeine intake to daily coffee.  - Continued labetalol at current dose of 1.5 pills twice daily.  - Continued hydralazine as needed for blood pressure over 160.  - Take next dose around 6:00 PM if blood pressure remains elevated, not before.  - May take up to 4 times daily if needed, but recommend at least 8 hours between doses for maximum of 3 doses in a day if needed.  - Educated on the importance of electrolyte balance in blood pressure regulation.  - Contact the office if blood pressure exceeds 200 systolic.    THYROID DISORDER:  - Explained relationship between thyroid function and GI symptoms.  - Thyroid function test  ordered.    MEDICATIONS/SUPPLEMENTS:  - Discussed how labetalol affects heart rate and exertion capacity.  - Provided information on hydralazine's mechanism of action and potential side effects.    LABS:  - Electrolytes panel ordered.  - Kidney function test (EGFR) ordered.  - KUB (abdominal X-ray) ordered.    FLU VACCINATION:  - Flu vaccine administered in office.    FOLLOW UP:  - Follow up with Dr. Camarena tomorrow as scheduled.

## 2024-10-10 ENCOUNTER — TELEPHONE (OUTPATIENT)
Dept: INTERNAL MEDICINE | Facility: CLINIC | Age: 89
End: 2024-10-10
Payer: MEDICARE

## 2024-10-10 ENCOUNTER — OFFICE VISIT (OUTPATIENT)
Dept: CARDIOLOGY | Facility: CLINIC | Age: 89
End: 2024-10-10
Payer: MEDICARE

## 2024-10-10 VITALS
HEIGHT: 63 IN | BODY MASS INDEX: 23.71 KG/M2 | WEIGHT: 133.81 LBS | DIASTOLIC BLOOD PRESSURE: 67 MMHG | SYSTOLIC BLOOD PRESSURE: 142 MMHG | HEART RATE: 61 BPM

## 2024-10-10 DIAGNOSIS — N18.32 STAGE 3B CHRONIC KIDNEY DISEASE: ICD-10-CM

## 2024-10-10 DIAGNOSIS — I10 HYPERTENSION, ESSENTIAL: Primary | Chronic | ICD-10-CM

## 2024-10-10 DIAGNOSIS — I70.0 AORTIC ATHEROSCLEROSIS: Chronic | ICD-10-CM

## 2024-10-10 DIAGNOSIS — E78.00 PURE HYPERCHOLESTEROLEMIA: Chronic | ICD-10-CM

## 2024-10-10 DIAGNOSIS — N18.32 STAGE 3B CHRONIC KIDNEY DISEASE: Primary | ICD-10-CM

## 2024-10-10 DIAGNOSIS — I65.23 BILATERAL CAROTID ARTERY STENOSIS: Chronic | ICD-10-CM

## 2024-10-10 DIAGNOSIS — I77.1 SUBCLAVIAN ARTERY STENOSIS, RIGHT: ICD-10-CM

## 2024-10-10 PROCEDURE — 99999 PR PBB SHADOW E&M-EST. PATIENT-LVL IV: CPT | Mod: PBBFAC,,, | Performed by: PHYSICIAN ASSISTANT

## 2024-10-10 PROCEDURE — 99214 OFFICE O/P EST MOD 30 MIN: CPT | Mod: PBBFAC,PO | Performed by: PHYSICIAN ASSISTANT

## 2024-10-10 RX ORDER — AMLODIPINE BESYLATE 5 MG/1
5 TABLET ORAL DAILY
Qty: 90 TABLET | Refills: 3 | Status: SHIPPED | OUTPATIENT
Start: 2024-10-10 | End: 2025-10-10

## 2024-10-10 NOTE — PATIENT INSTRUCTIONS
Please continue to take one and a half tablets of labetalol  and a half tablet of Tekturna every 12 hours.     Begin amlodipine 5 mg once in the evening.     You can continue to take hydralazine as needed for blood pressure > 160 on the top number.     Check your blood pressure and record it at least one hour after your first dose of medication.     Bring your record to Dr. Camarena

## 2024-10-10 NOTE — TELEPHONE ENCOUNTER
Please inform patient that x-ray reveals a mild amount of stool in the terminal part of the colon.  Also, thyroid is normal.  However, kidney function appears to have worsened.  Encouraged hydration and avoid anti-inflammatories.  Also, recommend referral to Nephrology for chronic kidney disease.

## 2024-10-10 NOTE — TELEPHONE ENCOUNTER
Cardiology PROGRESS NOTE       HISTORY     Nola Keith is a 69 year old female who has history of coronary artery disease s/p CABG x4 03/30/2021, peripheral vascular disease, diabetes, and hyperlipidemia who presents for a followup. Since the last visit on 2/10/2022, the patient states that she has been feeling well. The patient states that she is tired, and that her hands get cold in the winter. The patient denies any chest pains, palpitations, or shortness of breath. The patient denies any lightheadedness or dizziness. There is no orthopnea or PND. No other complaints at this time.    She states that she has been feeling generally fatigued, and weak since her bypass surgery. She also complaints of constant rhinorrhea.     The patient underwent cardiac catheterization on 03/26/2021 that showed severe three-vessel coronary artery disease and OPCAB x 4  SVG -> M1, SVG -> RCA and LIMA -> D1 -> LAD on 03/30/2021.     US carotid duplex bilateral 08/12/2022:  IMPRESSION:   1. Presence of heavily calcified significant right carotid bifurcation  plaque with 50-69% stenosis utilizing NASCET criteria. This potentially could be higher due to inadequate sampling.  2. Presence of focal calcified left carotid bifurcation plaque with 16-49% stenosis utilizing NASCET criteria.     Holter Monitor 06/04/2021:  FINDINGS:   The patient underwent a 48-hour Holter monitor.  She is noted to be in a normal sinus rhythm with a minimum heart rate of 52 beats per minute occurring at 4:18 in the morning, average heart rate of 65 beats per minute, maximum heart rate of 93 beats per minute.  There is no atrial fibrillation, atrial flutter or junctional rhythm noted.  There was no ventricular ectopic activity noted.  There were 13 isolated PACs.  The patient had no symptoms recorded.  There were no significant pauses.  This is an unremarkable 48-hour Holter monitor.   CONCLUSION:   This is an unremarkable 48-hour Holter monitor.  Pt was called and informed        Myocardial Vascularization 03/30/2021:  Off-pump coronary artery bypass, pump standby, coronary bypass grafting x4 vessels with saphenous vein graft to posterior descending artery.  Saphenous vein graft to circumflex marginal #1, left internal mammary sequential to diagonal left anterior descending.  Endoscopic saphenous vein harvesting.    Echo M-Mode/2D/Doppler 03/29/2021: EF 67.0%  Normal left ventricular chamber size. Mildly reduced left ventricular systolic function with ejection fraction of 67 %.  Grade I left ventricular diastolic dysfunction.  Normal right ventricular chamber size. Normal right ventricular systolic function. RVSP=23mmHg.  No significant hemodynamic valve abnormalities.  No pericardial effusion.  Hypermobile interatrial septum. Agitated saline was injected through a peripheral vein and did not show evidence of a right to left  shunt.  There is no prior complete echocardiogram for direct comparison.    US Carotid Bilateral 03/28/2021:  Approximate 50-69% narrowing of the right internal carotid artery.  Less than 15% narrowing of the left internal carotid artery.  FINDINGS are similar to previous study of October 2020.     Coronary Angiogram 03/26/2021:  Key Findings/Plan       · Mid RCA lesion with 70% stenosis.  · Ost Cx lesion with 60% stenosis.  · 1st Mrg lesion with 70% stenosis.  · Prox LAD lesion with 90% stenosis.  · 1st Diag lesion with 90% stenosis.     Patient has severe three-vessel coronary artery disease.  Will consult Dr. gao for myocardial revascularization.  She will be transferred to [facility] in [place]          Stress Test 03/15/2021:  This is an abnormal stress test and is compatible with ischemia.     US Carotid Duplex Bilateral 10/22/2020:  1. Right internal carotid artery:  Shadowing plaque limits the luminal  evaluation of the proximal ICA. Velocities are similar to 2019.  (50-69%)  using SRU criteria.   2. Left internal carotid artery:  Mild plaque  without significant stenosis.  (<50%) using SRU criteria.       Bilateral carotid duplex ultrasound 07/12/2019:  IMPRESSION:  1. RIGHT CAROTID: There is between 50-69% stenosis using NASCET criteria adapted to duplex ultrasound. Moderate focal plaque formation as detailed above.  2. LEFT CAROTID: There is no hemodynamically significant stenosis using NASCET criteria adapted to duplex ultrasound. No significant plaque formation.   3. Vertebral artery flow is antegrade bilaterally.  4. Recommend continued yearly surveillance to evaluate for disease progression.    Echo Stress 08/06/2018:  The patient has electrocardiographic evidence of ischemia but the echocardiogram is normal.  Clinical correlation recommended  ECHO FINDINGS:  Left ventricular ejection fraction normal at baseline. Left ventricular ejection fraction increased with stress. Normal LV regional wall motion at baseline. No LV regional wall motion abnormalities with stress. Definity contrast was utilized to better visualize the endocardial definition.    Bilateral carotid duplex ultrasound 07/27/2017:  IMPRESSION:  1. RIGHT CAROTID: There is between 50-69% stenosis using NASCET criteria adapted to duplex ultrasound. Moderate focal plaque formation as detailed above. The appearance is stable from the 2016 exam.   2. LEFT CAROTID: There is less than 50% stenosis using NASCET criteria adapted to duplex ultrasound. Mild focal plaque formation as detailed above. The appearance is stable from 2016 exam.  3. Vertebral artery flow is antegrade bilaterally.     The patient underwent a stress echocardiogram in which she exercised to a peak workload of 7.3 METs. There was no electrocardiographic evidence of ischemia. The echocardiogram was read as normal.       MEDICAL HISTORY     Past Medical History:   Diagnosis Date   • Anatomical narrow angle    • Anemia     in the past   • Arthritis    • CAD (coronary artery disease)    • Chronic kidney disease     minor loss of   function   • Chronic pain     neck   • Colon polyps    • DM (diabetes mellitus) (CMS/HCC)     NIDDM   • Gastroesophageal reflux disease    • HLD (hyperlipidemia)    • Hypothyroidism    • Peripheral neuropathy    • Pneumonia    • S/P CABG x 4 03/30/2021   • Tinnitus          SURGICAL HISTORY     Past Surgical History:   Procedure Laterality Date   • Cardiac catherization     • Colonoscopy  07/13/2020   • Colonoscopy w/ polypectomy  03/08/2010   • Egd  0713/2020   • Esophagogastroduodenoscopy transoral flex w/bx single or mult  03/08/2010    EGD with Bx   • Hysterectomy  08/20/2008   • Laparoscopy, cholecystectomy  12/10/2009    Cholecystectomy, laparoscopic   • Left heart cath,percutaneous  11/14/2007    Cardiac Cath   • Left heart cath,percutaneous  11/05/2004    Cardiac Cath   • Polypectomy  07/24/2006   • Removal gallbladder           MEDICATIONS     Current Outpatient Medications   Medication Sig   • metoPROLOL succinate (Toprol XL) 25 MG 24 hr tablet Take 1 tablet by mouth daily.   • empagliflozin (Jardiance) 25 MG tablet Take 1 tablet by mouth daily (before breakfast).   • COVID-19 mRNA bivalent, Pfizer, (Pfizer COVID-19 Vac Bivalent) 30 MCG/0.3ML Suspension Inject 0.3 mLs into the muscle once.   • influenza virus quadrivalent vaccine inactivated (Fluzone High-Dose Quadrivalent) 0.7 ML prefilled syringe for injection Inject 0.7 mLs into the muscle 1 time for 1 dose   • dulaglutide (Trulicity) 1.5 MG/0.5ML pen-injector Inject 1.5 mg into the skin 1 day a week.   • Insulin Pen Needle (B-D U/F PEN NEEDLE 5/16\") 31G X 8 MM Misc Use to inject insulin 4 times daily as directed.   • levothyroxine 88 MCG tablet Take 1 tablet by mouth daily.   • metFORMIN (GLUCOPHAGE) 500 MG tablet Take 2 tablets by mouth in the morning and 2 tablets in the evening. Take with meals.   • venlafaxine XR (EFFEXOR XR) 37.5 MG 24 hr capsule Take 1 capsule by mouth daily.   • esomeprazole (NexIUM) 20 MG capsule Take 1 capsule by mouth daily  (before breakfast).   • Continuous Blood Gluc  (Dexcom G6 ) Device 1 each every 12 months.   • Continuous Blood Gluc Sensor (Dexcom G6 Sensor) Misc Insert new sensor every 10 days.  Change sensor once every 10 days   • Continuous Blood Gluc Transmit (Dexcom G6 Transmitter) Misc 1 Package every 3 months.   • estradiol (VAGIFEM) 10 MCG vaginal tablet INSERT 1 TABLET VAGINALLY  EVERY WEEK   • Insulin Pen Needle (Pen Needles) 32G X 4 MM Misc USE TO INJECT OZEMPIC WEEKLY   • insulin detemir (Levemir FlexTouch) 100 UNIT/ML pen-injector Inject 9 Units into the skin nightly. Prime 2 units before each dose.   • zolpidem (AMBIEN) 5 MG tablet Take 1 tablet by mouth nightly as needed for sleep.   • fluconazole (DIFLUCAN) 150 MG tablet Take 1 tablet by mouth 1 day a week as needed for oral lichen planus symptoms, hold statin on the days she takes fluconazole..   • IRON, FERROUS SULFATE, PO Take 65 mg by mouth daily.   • ascorbic acid (Vitamin C) 250 MG tablet Take 250 mg by mouth daily.   • aspirin 81 MG chewable tablet Chew 1 tablet by mouth daily. Do not start before April 4, 2021.   • clobetasol (TEMOVATE) 0.05 % ointment Apply topically 2 times daily as needed.   • vitamin B-12 (CYANOCOBALAMIN) 1000 MCG tablet Take by mouth daily.    • clobetasol (TEMOVATE) 0.05 % cream Apply to affected area on arm 2 times daily x 2 weeks during flares. (Patient taking differently: as needed. Apply to affected area on arm 2 times daily x 2 weeks during flares.)   • Calcium Carbonate Antacid (TUMS E-X 750 PO) Take by mouth as needed. Daily for acid reflux    • Calcium Carbonate-Vitamin D (CALCIUM 600+D PO) Take 1 tablet by mouth daily.   • COENZYME Q-10 PO Take by mouth daily.    • VITAMIN D, CHOLECALCIFEROL, PO Take 1,000 Units by mouth daily. Vitamin D3   • Polyethylene Glycol 3350 (MIRALAX PO) Take 8.5 g by mouth daily.    • fish oil-omega-3 fatty acids 1000 MG CAPS Take 1,200 mg by mouth daily.    • Dispense (CHECK,  UNKNOWN CONCENTRATION) Multivitamin daily     No current facility-administered medications for this visit.       ALLERGIES   ALLERGIES:  No Known Allergies      PHYSICAL EXAM     Vital Signs:    Vitals:    11/29/22 0938   BP: 110/68   Pulse: 78   Resp: 12   Weight: 57.7 kg (127 lb 3.2 oz)   Height: 5' 3\" (1.6 m)   General:  NAD, pleasant, alert and oriented x3.  Neck:  Bilateral carotid bruits.  No JVD (jugular venous distension).  Normal carotid upstroke.  Pulmonary:  No retractions or increased work of breathing.  Clear to auscultation bilaterally.  No crackles or wheezing.  Cardiovascular:  PMI (point of maximal impulse) in 5th intercostal place.  RRR.  Normal S1 and S2.  No S3 or S4 appreciated.  There are no murmurs.  Abdomen:  Bowel sounds normoactive.  Soft, nontender, nondistended.  No hepatosplenomegaly.  Extremities:  No edema or cyanosis.     Glucose (mg/dL)   Date Value   07/08/2022 90       Cholesterol (mg/dL)   Date Value   07/08/2022 189     HDL (mg/dL)   Date Value   07/08/2022 79     Cholesterol/ HDL Ratio (no units)   Date Value   07/08/2022 2.4     Triglycerides (mg/dL)   Date Value   07/08/2022 85     LDL (mg/dL)   Date Value   07/08/2022 93       Creatinine (mg/dL)   Date Value   10/05/2022 0.71     BUN (mg/dL)   Date Value   07/08/2022 22 (H)     Hemoglobin A1C (%)   Date Value   10/05/2022 6.7 (H)     ASSESSMENT     1.  Bilateral carotid artery stenosis, 50-69% stenosis on the right internal carotid artery and less than 50% in the left internal carotid artery ultrasound on 03/28/2021 stable.   2.  Diabetes.  3.  Hyperlipidemia.  4.  S/P cardiac catheterization on 03/26/2021 that showed severe three-vessel coronary artery disease. S/P OPCAB x 4  SVG -> M1, SVG -> RCA and LIMA -> D1 -> LAD on 03/30/2021.    CHELLY Keith is a pleasant 69 year old white woman with history of peripheral vascular disease, coronary artery disease, diabetes, and hyperlipidemia who is not having any  complaints of chest pain, or shortness of breath, but having some generalized fatigue. US carotid duplex bilateral 08/12/2022 revealed presence of heavily calcified significant right carotid bifurcation  plaque with 50-69% stenosis, and presence of focal calcified left carotid bifurcation plaque with 16-49% stenosis. The patient underwent cardiac catheterization on 03/26/2021 that showed severe three-vessel coronary artery disease and OPCAB x 4  SVG -> M1, SVG -> RCA and LIMA -> D1 -> LAD on 03/30/2021. She had intra-op atrial fibrillation and was converted with IV Amiodarone. Her Holter monitor in 06/2021 was negative for atrial fibrillation so her Amiodarone was discontinued. Her echocardiogram 03/29/2021 was essentially normal with EF of 67%. Echo stress on 08/06/2018 showed normal. US Carotid on 03/28/2021 showed moderate right carotid and mild left carotid stenosis which are stable. Lipids on 07/08/2022 were stable with LDL of 93. The patient is stable from a cardiac standpoint. She is stable on her current regimen. She continues the current medications. She will follow up with me in 9 months. If she has any problems before then, she will contact me sooner. Thank you for allowing me to participate in the care of this patient.      On 11/29/2022, TIFFANY Wang, personally transcribed this document on behalf of  Arsalan Zavaleta MD.    The documentation recorded by the scribe accurately and completely reflects the service(s) I personally performed and the decisions made by me.           Sincerely,      Arsalan Zavaleta M.D.-Ph.D. [facility]

## 2024-10-10 NOTE — PROGRESS NOTES
Cardiology Clinic Note  Reason for Visit: HTN  General Cardiologist: Dr. Camarena     HPI:     Problem List:  Hypertension  - intolerance to multiple antihypertensive meds  - wide auscultatory gap  Right subclavian artery stenosis  Bilateral Carotid artery disease, 70-79%  - should not hold aspirin   Breast cancer      Gerda Lai is a 89 y.o. F, who presents for ongoing discussion related to BP management. She is currently taking labetalol 150 mg BID and aliskiren 150 mg BID (cutting tablet in half). It isn't clear who advised to spilt the dosage of aliskiren to me, but she tells me that although her BP remains elevated, she has actually been having less dizziness in the AM since making this change. She has a niece in healthcare that recommended she try to be more diligent at taking her medications at regular intervals, and that she only check her BP a while after her AM dose of medication.     She has previously taken amlodipine and tolerated well. It was stopped in 2023 following knee surgery due to dizziness. She is willing to try adding this back today.     She has been using hydralazine PRN for SBP > 160. She has only done this a few times, and states that it did help. She'd rather try once daily amlodipine rather than hydralazine TID.     MENG Lara HPI   Presents for follow up regarding HTN, HLD and carotid stenosis. At her last visit she was recommended to begin spironolactone. She reports an episode of dehydration requiring IV rehydration at the ER. She discontinued spironolactone following this incident. Her BP is normal in clinic. Her digital HTN number at home remain elevated. At last visit she was recommended to begin zetia, however the medication was never received at the pharmacy. She is being monitored for carotid stenosis, US 4/2023 did not show significant change from previous year.       ROS:    Pertinent ROS included in HPI and below.  PMH:     Past Medical History:   Diagnosis Date     Anxiety     Arthritis     Basal cell carcinoma 09/2016    right post auricular neck     Basal cell carcinoma of right forearm 04/19/2023    R lower forearm    Breast cancer 1992    right    Cataract     Fibromyalgia     History of measles as a child     Hyperlipidemia     Hypertension     Personal history of colonic polyps     Pneumonia     SCC (squamous cell carcinoma) 2015    R chest    SCC (squamous cell carcinoma) 2016    left medial shoulder    SCC (squamous cell carcinoma) 2017    left knee    SCC (squamous cell carcinoma) 2022    L upper chest, R upper arm KA types    Shingles     Squamous cell carcinoma 2015    right forearm    Thyroid disease     Vaginitis      Past Surgical History:   Procedure Laterality Date    ADENOIDECTOMY      ARTHROPLASTY, KNEE, TOTAL, USING COMPUTER-ASSISTED NAVIGATION Right 5/22/2023    Procedure: ARTHROPLASTY, KNEE, TOTAL, USING COMPUTER-ASSISTED NAVIGATION: QUYEN: RIGHT: [employer] - TRIATHALON;  Surgeon: Milton Cobian III, MD;  Location: [facility] OR;  Service: Orthopedics;  Laterality: Right;    BREAST BIOPSY Left     Excisional bx, benign    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    CARPAL TUNNEL RELEASE Right 3/16/2021    Procedure: RELEASE, CARPAL TUNNEL;  Surgeon: Barbara Aldridge MD;  Location: [facility] OR;  Service: Orthopedics;  Laterality: Right;    CATARACT EXTRACTION W/  INTRAOCULAR LENS IMPLANT Bilateral     EPIDURAL STEROID INJECTION N/A 2/24/2023    Procedure: HEIDI C7-T1;  Surgeon: Andi Cisneros DO;  Location: [facility] OR;  Service: Pain Management;  Laterality: N/A;    EPIDURAL STEROID INJECTION INTO CERVICAL SPINE N/A 6/10/2024    Procedure: C7-T1 HEIDI (toward the left);  Surgeon: Andi Cisneros DO;  Location: [facility];  Service: Pain Management;  Laterality: N/A;  RN sed w/Versed and Benadryl    EYE SURGERY      HAND SURGERY      INJECTION OF ANESTHETIC AGENT AROUND MEDIAL BRANCH NERVES INNERVATING CERVICAL FACET JOINT N/A 1/4/2022    Procedure:  Cervical Medial Branch Block C5-6, C6-7 (No Sedation);  Surgeon: Himanshu Blackmon Jr., MD;  Location: [facility] PAIN [facility]GT;  Service: Pain Management;  Laterality: N/A;    INJECTION OF ANESTHETIC AGENT AROUND MEDIAL BRANCH NERVES INNERVATING LUMBAR FACET JOINT Left 11/18/2021    Procedure: Cervical Medial Branch Block Left C5-6, C6-7 (IV Sedation);  Surgeon: Himanshu Blackmon Jr., MD;  Location: [facility]IN [facility]GT;  Service: Pain Management;  Laterality: Left;    JOINT REPLACEMENT Right     ELZBIETA    KNEE ARTHROPLASTY Left 8/10/2020    Procedure: ARTHROPLASTY, KNEE-ADE NEXGEN/CEMENTED TIBIA;  Surgeon: Kalin Pacheco MD;  Location: [facility] OR;  Service: Orthopedics;  Laterality: Left;    RADIOFREQUENCY THERMAL COAGULATION OF MEDIAL BRANCH OF POSTERIOR RAMUS OF CERVICAL SPINAL NERVE Left 3/8/2022    Procedure: RADIOFREQUENCY THERMAL COAGULATION, NERVE, SPINAL, CERVICAL, LEFT C5-6 AND C6-7;  Surgeon: Himanshu Blackmon Jr., MD;  Location: [facility]IN [facility]GT;  Service: Pain Management;  Laterality: Left;  NO PACEMAKER   ASA    thyriodectomy      partial    tonsillectomy      TONSILLECTOMY      TOTAL HIP ARTHROPLASTY      right    Yag  Left      Allergies:     Review of patient's allergies indicates:   Allergen Reactions    Sulfa (sulfonamide antibiotics) Rash    Clarithromycin Other (See Comments)     Weak, extreme fatigue. dizziness    Flexeril [cyclobenzaprine] Other (See Comments)     Dizziness    Iodine and iodide containing products     Lisinopril Other (See Comments)     Cough and sensation of throat swelling/?angioedema    Losartan Rash    Metoprolol Swelling     Tightness in throat    Spironolactone      Throat tightening    Tramadol Other (See Comments)     Dizzy and weak    Verapamil (bulk) Palpitations    Voltaren [diclofenac sodium] Other (See Comments)     Drops blood pressure     Medications:     Current Outpatient Medications on File Prior to Visit   Medication Sig Dispense Refill    acetaminophen (TYLENOL) 650 MG TbSR  Take 1 tablet (650 mg total) by mouth every 8 (eight) hours. 120 tablet 0    aliskiren (TEKTURNA) 300 MG Tab TAKE 1 TABLET BY MOUTH EVERY DAY 90 tablet 2    aspirin (ECOTRIN) 81 MG EC tablet TAKE 1 TABLET BY MOUTH EVERY DAY 90 tablet 3    b complex vitamins tablet Take 1 tablet by mouth once daily.      coenzyme Q10 100 mg capsule Take 100 mg by mouth once daily.      ezetimibe (ZETIA) 10 mg tablet Take 1 tablet (10 mg total) by mouth once daily. 90 tablet 3    glucosamine-chondroitin 500-400 mg tablet Take 1 tablet by mouth once daily.       hydrALAZINE (APRESOLINE) 25 MG tablet Take 1 tablet (25 mg total) by mouth 2 (two) times daily as needed (for systolic blood pressure >160). 30 tablet 3    labetaloL (NORMODYNE) 100 MG tablet TAKE ONE AND ONE-HALF TABLETS BY MOUTH EVERY 12 HOURS 270 tablet 2    LORazepam (ATIVAN) 0.5 MG tablet TAKE 1/2 TABLET TO 1 TABLET BY MOUTH EVERY 12 HOURS AS NEEDED FOR ANXIETY 30 tablet 0    multivitamin capsule Take 1 capsule by mouth once daily.      omeprazole (PRILOSEC OTC) 20 MG tablet Take 20 mg by mouth once daily.      pravastatin (PRAVACHOL) 80 MG tablet TAKE 1 TABLET (80 MG TOTAL) BY MOUTH ONCE DAILY. 90 tablet 3    psyllium husk, aspartame, (METAMUCIL) 3.4 gram PwPk packet Take 1 packet by mouth once daily. 30 packet 1    TURMERIC ORAL Take 500 mg by mouth once daily.       vitamin D (VITAMIN D3) 1000 units Tab Take 1,000 Units by mouth once daily.       No current facility-administered medications on file prior to visit.     Social History:     Social History     Tobacco Use    Smoking status: Never     Passive exposure: Never    Smokeless tobacco: Never   Substance Use Topics    Alcohol use: Yes     Comment: socially - celebrations only     Family History:     Family History   Problem Relation Name Age of Onset    Breast cancer Mother      Cancer Mother      Stroke Paternal Grandfather      Heart disease Father      Cancer Paternal Aunt      Heart disease Paternal Aunt       "Glaucoma Paternal Grandmother      Amblyopia Neg Hx      Blindness Neg Hx      Cataracts Neg Hx      Diabetes Neg Hx      Hypertension Neg Hx      Macular degeneration Neg Hx      Retinal detachment Neg Hx      Strabismus Neg Hx      Thyroid disease Neg Hx      Melanoma Neg Hx       Physical Exam:   BP (!) 142/67   Pulse 61   Ht 5' 3" (1.6 m)   Wt 60.7 kg (133 lb 13.1 oz)   BMI 23.71 kg/m²      Physical Exam  Vitals and nursing note reviewed.   Constitutional:       Appearance: Normal appearance.   HENT:      Head: Normocephalic and atraumatic.   Neck:      Vascular: Normal carotid pulses. No carotid bruit or hepatojugular reflux.   Cardiovascular:      Rate and Rhythm: Normal rate and regular rhythm.      Chest Wall: PMI is not displaced.      Pulses:           Carotid pulses are  on the right side with bruit and  on the left side with bruit.       Radial pulses are 2+ on the right side and 2+ on the left side.        Dorsalis pedis pulses are 2+ on the right side and 2+ on the left side.        Posterior tibial pulses are 2+ on the right side and 2+ on the left side.      Heart sounds: No murmur heard.  Pulmonary:      Effort: Pulmonary effort is normal.      Breath sounds: Normal breath sounds. No wheezing, rhonchi or rales.   Abdominal:      General: Bowel sounds are normal. There is no abdominal bruit.      Palpations: Abdomen is soft. There is no pulsatile mass.      Tenderness: There is no abdominal tenderness.   Feet:      Right foot:      Skin integrity: Skin integrity normal.      Left foot:      Skin integrity: Skin integrity normal.   Skin:     Capillary Refill: Capillary refill takes less than 2 seconds.   Neurological:      General: No focal deficit present.      Mental Status: She is alert.   Psychiatric:         Mood and Affect: Mood and affect normal.         Speech: Speech normal.         Behavior: Behavior is cooperative.         Thought Content: Thought content normal.          Labs: "     Blood Tests:  Lab Results   Component Value Date    BNP 42 2023     10/09/2024    K 4.7 10/09/2024     10/09/2024    CO2 23 10/09/2024    BUN 25 10/09/2024    CREATININE 1.3 10/09/2024    GLU 93 10/09/2024    HGBA1C 5.5 2011    MG 1.8 2023    AST 25 2024    ALT 17 2024    ALBUMIN 3.8 2024    PROT 6.2 2024    BILITOT 1.1 (H) 2024    WBC 7.13 2023    HGB 12.4 2023    HCT 36.5 (L) 2023    HCT 33 (L) 2023    MCV 97 2023    MCV 98.2 (H) 10/13/2004     2023    INR 1.0 2023    TSH 1.466 10/09/2024       Lab Results   Component Value Date    CHOL 152 2024    HDL 51 2024    TRIG 125 2024       Lab Results   Component Value Date    LDLCALC 76.0 2024         Imaging:     Echocardiogram  None    Stress testing  None    Cath Lab  None    Other  Carotid US 2024    There is 70-79% right Internal Carotid Stenosis.    There is 70-79% left Internal Carotid Stenosis.    Carotid US 4/10/2023  There is greater than 70% right Internal Carotid Stenosis.  There is greater than 70% left Internal Carotid Stenosis.  There is antegrade flow in the vertebral arteries bilaterally.  There is greater than 50% external carotid artery stenosis bilaterally.    Carotid US 2023  There is 70-79% right Internal Carotid Stenosis.  There is 70-79% left Internal Carotid Stenosis.  >50% bilat ECA stenosis.  Compared with prior report dated 21, R ICA stenosis has progressed.    Carotid US 2021  There is 60-69% right internal carotid artery stenosis.  There is 70-79% left internal carotid artery stenosis.    EK2023  Sinus bradycardia with 1st degree A-V block   Septal infarct (cited on or before 19-AUG-2023)   Abnormal ECG   When compared with ECG of 28-MAR-2023 13:26,   No significant change was found     Assessment:     1. Hypertension, essential    2. Bilateral carotid artery stenosis    3.  Pure hypercholesterolemia    4. Subclavian artery stenosis, right    5. Aortic atherosclerosis    6. Stage 3b chronic kidney disease          Plan:     Hypertension, essential  -     amLODIPine (NORVASC) 5 MG tablet; Take 1 tablet (5 mg total) by mouth once daily.  Dispense: 90 tablet; Refill: 3  Continue labetalol 150 mg b.i.d.  Continue aliskiern 150 mg b.i.d.  Begin amlodipine 5 mg q.p.m.    Bilateral carotid artery stenosis  Continue aspirin and statin   Aspirin should be held only if absolutely necessary for any procedure     Pure hypercholesterolemia  Continue pravastatin 80 mg q.d. and Zetia 10 mg q.d.  Last fasting lipid panel drawn September 17, 2024 following the addition of Zetia  LDL had previously been 80-90, now 76    Subclavian artery stenosis, right  Blood check pressure should be checked in left arm only    Aortic atherosclerosis  Continue aspirin and statin    CKD3b  Avoid the use of nephrotoxic medications when considering BP management          Signed:  Holli Lara PA-C  Cardiology     10/10/2024 12:41 PM    Follow-up:     Future Appointments   Date Time Provider Department Center   10/11/2024 11:00 AM Isidro Bhatt MD [facility] NEPHRO [address]   10/14/2024  1:30 PM Tomas Torre, FNP-C [facility] IM [place]   10/15/2024  1:00 PM Ebenezer Sanchez, PT [facility]   10/17/2024  3:30 PM Osmar Mills, PT [facility]   10/22/2024  1:00 PM Ebenezer Sanchez, PT [facility]   10/24/2024  2:30 PM Osmar Mills, PT [facility]   10/28/2024  2:00 PM Andi Cisneros DO [facility] PAIN[facility] [place]   10/29/2024 10:00 AM Lizeth Camarena MD [facility] CARDIO [place]   10/29/2024  1:00 PM Osmar Mills, PT [facility]   11/11/2024  1:00 PM Tomas Beltran MD [facility] IM [place]

## 2024-10-11 ENCOUNTER — OFFICE VISIT (OUTPATIENT)
Dept: NEPHROLOGY | Facility: CLINIC | Age: 89
End: 2024-10-11
Payer: MEDICARE

## 2024-10-11 ENCOUNTER — LAB VISIT (OUTPATIENT)
Dept: LAB | Facility: HOSPITAL | Age: 89
End: 2024-10-11
Attending: INTERNAL MEDICINE
Payer: MEDICARE

## 2024-10-11 VITALS
WEIGHT: 131.63 LBS | HEART RATE: 68 BPM | BODY MASS INDEX: 23.32 KG/M2 | SYSTOLIC BLOOD PRESSURE: 129 MMHG | DIASTOLIC BLOOD PRESSURE: 70 MMHG | HEIGHT: 63 IN

## 2024-10-11 DIAGNOSIS — N18.32 STAGE 3B CHRONIC KIDNEY DISEASE: ICD-10-CM

## 2024-10-11 DIAGNOSIS — R09.89 LABILE HYPERTENSION: ICD-10-CM

## 2024-10-11 DIAGNOSIS — N18.32 STAGE 3B CHRONIC KIDNEY DISEASE: Primary | ICD-10-CM

## 2024-10-11 LAB
25(OH)D3+25(OH)D2 SERPL-MCNC: 40 NG/ML (ref 30–96)
ALBUMIN SERPL BCP-MCNC: 3.8 G/DL (ref 3.5–5.2)
ANION GAP SERPL CALC-SCNC: 10 MMOL/L (ref 8–16)
BASOPHILS # BLD AUTO: 0.05 K/UL (ref 0–0.2)
BASOPHILS NFR BLD: 0.7 % (ref 0–1.9)
BNP SERPL-MCNC: 189 PG/ML (ref 0–99)
BUN SERPL-MCNC: 26 MG/DL (ref 10–30)
CALCIUM SERPL-MCNC: 9.5 MG/DL (ref 8.7–10.5)
CHLORIDE SERPL-SCNC: 107 MMOL/L (ref 95–110)
CK SERPL-CCNC: 106 U/L (ref 20–180)
CO2 SERPL-SCNC: 23 MMOL/L (ref 23–29)
CREAT SERPL-MCNC: 1.4 MG/DL (ref 0.5–1.4)
DIFFERENTIAL METHOD BLD: ABNORMAL
EOSINOPHIL # BLD AUTO: 0.5 K/UL (ref 0–0.5)
EOSINOPHIL NFR BLD: 6.6 % (ref 0–8)
ERYTHROCYTE [DISTWIDTH] IN BLOOD BY AUTOMATED COUNT: 11.8 % (ref 11.5–14.5)
EST. GFR  (NO RACE VARIABLE): 35.5 ML/MIN/1.73 M^2
FERRITIN SERPL-MCNC: 291 NG/ML (ref 20–300)
GLUCOSE SERPL-MCNC: 94 MG/DL (ref 70–110)
HCT VFR BLD AUTO: 34.5 % (ref 37–48.5)
HGB BLD-MCNC: 11.3 G/DL (ref 12–16)
IMM GRANULOCYTES # BLD AUTO: 0.02 K/UL (ref 0–0.04)
IMM GRANULOCYTES NFR BLD AUTO: 0.3 % (ref 0–0.5)
IRON SERPL-MCNC: 63 UG/DL (ref 30–160)
LYMPHOCYTES # BLD AUTO: 1.7 K/UL (ref 1–4.8)
LYMPHOCYTES NFR BLD: 22.6 % (ref 18–48)
MCH RBC QN AUTO: 33.2 PG (ref 27–31)
MCHC RBC AUTO-ENTMCNC: 32.8 G/DL (ref 32–36)
MCV RBC AUTO: 102 FL (ref 82–98)
MONOCYTES # BLD AUTO: 0.9 K/UL (ref 0.3–1)
MONOCYTES NFR BLD: 12.3 % (ref 4–15)
NEUTROPHILS # BLD AUTO: 4.3 K/UL (ref 1.8–7.7)
NEUTROPHILS NFR BLD: 57.5 % (ref 38–73)
NRBC BLD-RTO: 0 /100 WBC
PHOSPHATE SERPL-MCNC: 4.4 MG/DL (ref 2.7–4.5)
PLATELET # BLD AUTO: 245 K/UL (ref 150–450)
PMV BLD AUTO: 8.6 FL (ref 9.2–12.9)
POTASSIUM SERPL-SCNC: 4.4 MMOL/L (ref 3.5–5.1)
PTH-INTACT SERPL-MCNC: 98.1 PG/ML (ref 9–77)
RBC # BLD AUTO: 3.4 M/UL (ref 4–5.4)
SATURATED IRON: 18 % (ref 20–50)
SODIUM SERPL-SCNC: 140 MMOL/L (ref 136–145)
TOTAL IRON BINDING CAPACITY: 360 UG/DL (ref 250–450)
TRANSFERRIN SERPL-MCNC: 243 MG/DL (ref 200–375)
WBC # BLD AUTO: 7.4 K/UL (ref 3.9–12.7)

## 2024-10-11 PROCEDURE — 83970 ASSAY OF PARATHORMONE: CPT | Performed by: INTERNAL MEDICINE

## 2024-10-11 PROCEDURE — 85025 COMPLETE CBC W/AUTO DIFF WBC: CPT | Performed by: INTERNAL MEDICINE

## 2024-10-11 PROCEDURE — 99999 PR PBB SHADOW E&M-EST. PATIENT-LVL V: CPT | Mod: PBBFAC,,, | Performed by: INTERNAL MEDICINE

## 2024-10-11 PROCEDURE — 80069 RENAL FUNCTION PANEL: CPT | Performed by: INTERNAL MEDICINE

## 2024-10-11 PROCEDURE — 99215 OFFICE O/P EST HI 40 MIN: CPT | Mod: PBBFAC | Performed by: INTERNAL MEDICINE

## 2024-10-11 PROCEDURE — 84466 ASSAY OF TRANSFERRIN: CPT | Performed by: INTERNAL MEDICINE

## 2024-10-11 PROCEDURE — 82728 ASSAY OF FERRITIN: CPT | Performed by: INTERNAL MEDICINE

## 2024-10-11 PROCEDURE — 83880 ASSAY OF NATRIURETIC PEPTIDE: CPT | Performed by: INTERNAL MEDICINE

## 2024-10-11 PROCEDURE — 82306 VITAMIN D 25 HYDROXY: CPT | Performed by: INTERNAL MEDICINE

## 2024-10-11 PROCEDURE — 36415 COLL VENOUS BLD VENIPUNCTURE: CPT | Performed by: INTERNAL MEDICINE

## 2024-10-11 PROCEDURE — 83835 ASSAY OF METANEPHRINES: CPT | Performed by: INTERNAL MEDICINE

## 2024-10-11 PROCEDURE — 82550 ASSAY OF CK (CPK): CPT | Performed by: INTERNAL MEDICINE

## 2024-10-11 PROCEDURE — 82088 ASSAY OF ALDOSTERONE: CPT | Performed by: INTERNAL MEDICINE

## 2024-10-11 NOTE — PROGRESS NOTES
REASON FOR CONSULT/CHIEF COMPLAIN: Renal dysfunction    REFERRING PHYSICIAN: Tomas Beltran MD    HISTORY OF PRESENT ILLNESS: 89 y.o. female who is new to me, referred here for abnormal renal function.  No chronic NSAID use, no known exposure to lithium, lead. No family history of Lupus, kidney disease or deafness. No ingestion of black licorice. No kidney stones. No smoking.  Denied any issues with urination including dysuria, hematuria, urgency, hesitancy, nocturia, incomplete voiding. Denied chest pain, nausea, vomiting, abd pain, diarrhea, shortness of breath, pedal edema, Orthopnea, PND.      ROS:  General: negative for chills, or fatigue  Respiratory: No cough, shortness of breath, or wheezing  Cardiovascular: No chest pain or dyspnea   Gastrointestinal: No abdominal pain, change in bowel habits    MEDICAL PROBLEMS:  Past Medical History:   Diagnosis Date    Anxiety     Arthritis     Basal cell carcinoma 09/2016    right post auricular neck     Basal cell carcinoma of right forearm 04/19/2023    R lower forearm    Breast cancer 1992    right    Cataract     Fibromyalgia     History of measles as a child     Hyperlipidemia     Hypertension     Personal history of colonic polyps     Pneumonia     SCC (squamous cell carcinoma) 2015    R chest    SCC (squamous cell carcinoma) 2016    left medial shoulder    SCC (squamous cell carcinoma) 2017    left knee    SCC (squamous cell carcinoma) 2022    L upper chest, R upper arm KA types    Shingles     Squamous cell carcinoma 2015    right forearm    Thyroid disease     Vaginitis        SURGICAL PROBLEMS:  Past Surgical History:   Procedure Laterality Date    ADENOIDECTOMY      ARTHROPLASTY, KNEE, TOTAL, USING COMPUTER-ASSISTED NAVIGATION Right 5/22/2023    Procedure: ARTHROPLASTY, KNEE, TOTAL, USING COMPUTER-ASSISTED NAVIGATION: QUYEN: RIGHT: MARTHA - IDANIA;  Surgeon: Milton Cobian III, MD;  Location: [facility];  Service: Orthopedics;  Laterality: Right;    BREAST  BIOPSY Left     Excisional bx, benign    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    CARPAL TUNNEL RELEASE Right 3/16/2021    Procedure: RELEASE, CARPAL TUNNEL;  Surgeon: Barbara Aldridge MD;  Location: [facility] OR;  Service: Orthopedics;  Laterality: Right;    CATARACT EXTRACTION W/  INTRAOCULAR LENS IMPLANT Bilateral     EPIDURAL STEROID INJECTION N/A 2/24/2023    Procedure: HEIDI C7-T1;  Surgeon: Andi Cisneros DO;  Location: [facility] OR;  Service: Pain Management;  Laterality: N/A;    EPIDURAL STEROID INJECTION INTO CERVICAL SPINE N/A 6/10/2024    Procedure: C7-T1 HEIDI (toward the left);  Surgeon: Andi Cisneros DO;  Location: [facility] PAIN MANAGEMENT;  Service: Pain Management;  Laterality: N/A;  RN sed w/Versed and Benadryl    EYE SURGERY      HAND SURGERY      INJECTION OF ANESTHETIC AGENT AROUND MEDIAL BRANCH NERVES INNERVATING CERVICAL FACET JOINT N/A 1/4/2022    Procedure: Cervical Medial Branch Block C5-6, C6-7 (No Sedation);  Surgeon: Himanshu Blackmon Jr., MD;  Location: [facility] PAIN MGT;  Service: Pain Management;  Laterality: N/A;    INJECTION OF ANESTHETIC AGENT AROUND MEDIAL BRANCH NERVES INNERVATING LUMBAR FACET JOINT Left 11/18/2021    Procedure: Cervical Medial Branch Block Left C5-6, C6-7 (IV Sedation);  Surgeon: Himanshu Blackmon Jr., MD;  Location: [facility] PAIN MGT;  Service: Pain Management;  Laterality: Left;    JOINT REPLACEMENT Right     ELZBIETA    KNEE ARTHROPLASTY Left 8/10/2020    Procedure: ARTHROPLASTY, KNEE-ADE NEXGEN/CEMENTED TIBIA;  Surgeon: Kalin Pacheco MD;  Location: [facility] OR;  Service: Orthopedics;  Laterality: Left;    RADIOFREQUENCY THERMAL COAGULATION OF MEDIAL BRANCH OF POSTERIOR RAMUS OF CERVICAL SPINAL NERVE Left 3/8/2022    Procedure: RADIOFREQUENCY THERMAL COAGULATION, NERVE, SPINAL, CERVICAL, LEFT C5-6 AND C6-7;  Surgeon: Himanshu Blackmon Jr., MD;  Location: [facility] PAIN MGT;  Service: Pain Management;  Laterality: Left;  NO PACEMAKER   ASA    thyriodectomy      partial     tonsillectomy      TONSILLECTOMY      TOTAL HIP ARTHROPLASTY      right    Yag  Left        FAMILY HISTORY:   Family History   Problem Relation Name Age of Onset    Breast cancer Mother      Cancer Mother      Stroke Paternal Grandfather      Heart disease Father      Cancer Paternal Aunt      Heart disease Paternal Aunt      Glaucoma Paternal Grandmother      Amblyopia Neg Hx      Blindness Neg Hx      Cataracts Neg Hx      Diabetes Neg Hx      Hypertension Neg Hx      Macular degeneration Neg Hx      Retinal detachment Neg Hx      Strabismus Neg Hx      Thyroid disease Neg Hx      Melanoma Neg Hx         SOCIAL HISTORY:  Social History     Socioeconomic History    Marital status: Single   Occupational History     Employer: RETIRED   Tobacco Use    Smoking status: Never     Passive exposure: Never    Smokeless tobacco: Never   Substance and Sexual Activity    Alcohol use: Yes     Comment: socially - celebrations only    Drug use: Never    Sexual activity: Not Currently     Social Drivers of Health     Financial Resource Strain: Low Risk  (8/29/2023)    Overall Financial Resource Strain (CARDIA)     Difficulty of Paying Living Expenses: Not hard at all   Food Insecurity: No Food Insecurity (8/29/2023)    Hunger Vital Sign     Worried About Running Out of Food in the Last Year: Never true     Ran Out of Food in the Last Year: Never true   Transportation Needs: No Transportation Needs (8/29/2023)    PRAPARE - Transportation     Lack of Transportation (Medical): No     Lack of Transportation (Non-Medical): No   Physical Activity: Insufficiently Active (8/29/2023)    Exercise Vital Sign     Days of Exercise per Week: 7 days     Minutes of Exercise per Session: 20 min   Stress: No Stress Concern Present (8/29/2023)    [employer] - Occupational Stress Questionnaire     Feeling of Stress : Only a little   Housing Stability: Low Risk  (8/29/2023)    Housing Stability Vital Sign     Unable to  Pay for Housing in the Last Year: No     Number of Places Lived in the Last Year: 1     Unstable Housing in the Last Year: No       ALLERGIES:  Review of patient's allergies indicates:   Allergen Reactions    Sulfa (sulfonamide antibiotics) Rash    Clarithromycin Other (See Comments)     Weak, extreme fatigue. dizziness    Flexeril [cyclobenzaprine] Other (See Comments)     Dizziness    Iodine and iodide containing products     Lisinopril Other (See Comments)     Cough and sensation of throat swelling/?angioedema    Losartan Rash    Metoprolol Swelling     Tightness in throat    Spironolactone      Throat tightening    Tramadol Other (See Comments)     Dizzy and weak    Verapamil (bulk) Palpitations    Voltaren [diclofenac sodium] Other (See Comments)     Drops blood pressure       MEDICATIONS:    Current Outpatient Medications:     acetaminophen (TYLENOL) 650 MG TbSR, Take 1 tablet (650 mg total) by mouth every 8 (eight) hours., Disp: 120 tablet, Rfl: 0    aliskiren (TEKTURNA) 300 MG Tab, TAKE 1 TABLET BY MOUTH EVERY DAY, Disp: 90 tablet, Rfl: 2    amLODIPine (NORVASC) 5 MG tablet, Take 1 tablet (5 mg total) by mouth once daily., Disp: 90 tablet, Rfl: 3    aspirin (ECOTRIN) 81 MG EC tablet, TAKE 1 TABLET BY MOUTH EVERY DAY, Disp: 90 tablet, Rfl: 3    b complex vitamins tablet, Take 1 tablet by mouth once daily., Disp: , Rfl:     coenzyme Q10 100 mg capsule, Take 100 mg by mouth once daily., Disp: , Rfl:     ezetimibe (ZETIA) 10 mg tablet, Take 1 tablet (10 mg total) by mouth once daily., Disp: 90 tablet, Rfl: 3    glucosamine-chondroitin 500-400 mg tablet, Take 1 tablet by mouth once daily. , Disp: , Rfl:     hydrALAZINE (APRESOLINE) 25 MG tablet, Take 1 tablet (25 mg total) by mouth 2 (two) times daily as needed (for systolic blood pressure >160)., Disp: 30 tablet, Rfl: 3    labetaloL (NORMODYNE) 100 MG tablet, TAKE ONE AND ONE-HALF TABLETS BY MOUTH EVERY 12 HOURS, Disp: 270 tablet, Rfl: 2    LORazepam (ATIVAN)  0.5 MG tablet, TAKE 1/2 TABLET TO 1 TABLET BY MOUTH EVERY 12 HOURS AS NEEDED FOR ANXIETY, Disp: 30 tablet, Rfl: 0    multivitamin capsule, Take 1 capsule by mouth once daily., Disp: , Rfl:     omeprazole (PRILOSEC OTC) 20 MG tablet, Take 20 mg by mouth once daily., Disp: , Rfl:     pravastatin (PRAVACHOL) 80 MG tablet, TAKE 1 TABLET (80 MG TOTAL) BY MOUTH ONCE DAILY., Disp: 90 tablet, Rfl: 3    psyllium husk, aspartame, (METAMUCIL) 3.4 gram PwPk packet, Take 1 packet by mouth once daily., Disp: 30 packet, Rfl: 1    TURMERIC ORAL, Take 500 mg by mouth once daily. , Disp: , Rfl:     vitamin D (VITAMIN D3) 1000 units Tab, Take 1,000 Units by mouth once daily., Disp: , Rfl:    Medication List with Changes/Refills   Current Medications    ACETAMINOPHEN (TYLENOL) 650 MG TBSR    Take 1 tablet (650 mg total) by mouth every 8 (eight) hours.    ALISKIREN (TEKTURNA) 300 MG TAB    TAKE 1 TABLET BY MOUTH EVERY DAY    AMLODIPINE (NORVASC) 5 MG TABLET    Take 1 tablet (5 mg total) by mouth once daily.    ASPIRIN (ECOTRIN) 81 MG EC TABLET    TAKE 1 TABLET BY MOUTH EVERY DAY    B COMPLEX VITAMINS TABLET    Take 1 tablet by mouth once daily.    COENZYME Q10 100 MG CAPSULE    Take 100 mg by mouth once daily.    EZETIMIBE (ZETIA) 10 MG TABLET    Take 1 tablet (10 mg total) by mouth once daily.    GLUCOSAMINE-CHONDROITIN 500-400 MG TABLET    Take 1 tablet by mouth once daily.     HYDRALAZINE (APRESOLINE) 25 MG TABLET    Take 1 tablet (25 mg total) by mouth 2 (two) times daily as needed (for systolic blood pressure >160).    LABETALOL (NORMODYNE) 100 MG TABLET    TAKE ONE AND ONE-HALF TABLETS BY MOUTH EVERY 12 HOURS    LORAZEPAM (ATIVAN) 0.5 MG TABLET    TAKE 1/2 TABLET TO 1 TABLET BY MOUTH EVERY 12 HOURS AS NEEDED FOR ANXIETY    MULTIVITAMIN CAPSULE    Take 1 capsule by mouth once daily.    OMEPRAZOLE (PRILOSEC OTC) 20 MG TABLET    Take 20 mg by mouth once daily.    PRAVASTATIN (PRAVACHOL) 80 MG TABLET    TAKE 1 TABLET (80 MG TOTAL) BY  "MOUTH ONCE DAILY.    PSYLLIUM HUSK, ASPARTAME, (METAMUCIL) 3.4 GRAM PWPK PACKET    Take 1 packet by mouth once daily.    TURMERIC ORAL    Take 500 mg by mouth once daily.     VITAMIN D (VITAMIN D3) 1000 UNITS TAB    Take 1,000 Units by mouth once daily.        PHYSICAL EXAM:  /70   Pulse 68   Ht 5' 3" (1.6 m)   Wt 59.7 kg (131 lb 9.8 oz)   BMI 23.31 kg/m²     General: No distress, No fever or chills  Neck: No adenopathy,no carotid bruit,no JVD  Lungs:Clear to auscultation bilaterally, No Crackles  Heart: Regular rate and rhythm, no murmur, gallops or rubs  Abdomen: Soft, no tenderness, bowel sounds normal  Extremities: Atraumatic, no edema in LE  Skin: Turgor normal. No rashes  Neurologic: No focal weakness, oriented.  Dialysis Access: Non applicable          LABS:  Lab Results   Component Value Date    HGB 12.4 08/19/2023        Lab Results   Component Value Date    CREATININE 1.3 10/09/2024       No results found for: "UTPCR"    Lab Results   Component Value Date     10/09/2024    K 4.7 10/09/2024    CO2 23 10/09/2024       last PTH   Lab Results   Component Value Date    PTH 46.0 02/11/2019    CALCIUM 9.5 10/09/2024    CAION 1.20 08/25/2011    PHOS 4.3 06/05/2023       Lab Results   Component Value Date    HGBA1C 5.5 05/27/2011       Lab Results   Component Value Date    LDLCALC 76.0 09/17/2024         Creatinine, Urine   Date Value Ref Range Status   07/06/2020 151.0 15.0 - 325.0 mg/dL Final     Comment:     The random urine reference ranges provided were established   for 24 hour urine collections.  No reference ranges exist for  random urine specimens.  Correlate clinically.     05/17/2017 103.0 15.0 - 325.0 mg/dL Final     Comment:     The random urine reference ranges provided were established   for 24 hour urine collections.  No reference ranges exist for  random urine specimens.  Correlate clinically.     12/02/2015 23.0 15.0 - 325.0 mg/dL Final     Comment:     The random urine reference " "ranges provided were established   for 24 hour urine collections.  No reference ranges exist for  random urine specimens.  Correlate clinically.         Protein, Urine   Date Value Ref Range Status   02/13/2019 <7 0 - 15 mg/dL Final       No results found for: "UTPCR"      ASSESSMENT AND PLAN:    1) Chronic kidney disease 3b   UA did not show any RBC, mild proteinuria. Chronic kidney disease likely due to HTN and age related nephron loss. Electrolytes, Acid Base, Hemoglobin, Bone Mineral in acceptable range. Monitor    2) Labile hypertension   Renal doppler from 2019 did not show any stenosis. Renin/day from 2019 is normal.   Patient does not cook at home and eats at different restaurants. This is definitely contributing to her labile HTN;    - She has been on labetolol 300 mg BID, Aliskiren 300 mg. During her cardiology visit yesterday norvasc 5 mg has been added.  - Reordered renal doppler, metanephrines and renin/day.  - Referred to dietician so that she can work on eating a more consistent low sodium diet.      RTC in 4 months.    Diagnosis and plan of care explained to the patient.  Pt Verbalized understanding. Answered all questions.  Thanks for allowing me to participate in the care of this patient.     11:17 AM    Isidro Bhatt MD  Nephrology & Critical Care      "

## 2024-10-14 ENCOUNTER — LAB VISIT (OUTPATIENT)
Dept: LAB | Facility: HOSPITAL | Age: 89
End: 2024-10-14
Payer: MEDICARE

## 2024-10-14 ENCOUNTER — OFFICE VISIT (OUTPATIENT)
Dept: INTERNAL MEDICINE | Facility: CLINIC | Age: 89
End: 2024-10-14
Payer: MEDICARE

## 2024-10-14 VITALS
TEMPERATURE: 98 F | WEIGHT: 134.5 LBS | OXYGEN SATURATION: 99 % | HEIGHT: 63 IN | BODY MASS INDEX: 23.83 KG/M2 | SYSTOLIC BLOOD PRESSURE: 130 MMHG | DIASTOLIC BLOOD PRESSURE: 50 MMHG | HEART RATE: 60 BPM

## 2024-10-14 DIAGNOSIS — Z91.81 RISK FOR FALLS: ICD-10-CM

## 2024-10-14 DIAGNOSIS — Z12.31 ENCOUNTER FOR SCREENING MAMMOGRAM FOR MALIGNANT NEOPLASM OF BREAST: ICD-10-CM

## 2024-10-14 DIAGNOSIS — Z74.09 IMPAIRED FUNCTIONAL MOBILITY, BALANCE, AND ENDURANCE: ICD-10-CM

## 2024-10-14 DIAGNOSIS — Z78.0 ASYMPTOMATIC MENOPAUSAL STATE: ICD-10-CM

## 2024-10-14 DIAGNOSIS — I70.0 AORTIC ATHEROSCLEROSIS: Chronic | ICD-10-CM

## 2024-10-14 DIAGNOSIS — R73.01 IFG (IMPAIRED FASTING GLUCOSE): ICD-10-CM

## 2024-10-14 DIAGNOSIS — E78.00 PURE HYPERCHOLESTEROLEMIA: Chronic | ICD-10-CM

## 2024-10-14 DIAGNOSIS — F41.9 ANXIETY: Chronic | ICD-10-CM

## 2024-10-14 DIAGNOSIS — I65.23 BILATERAL CAROTID ARTERY STENOSIS: Chronic | ICD-10-CM

## 2024-10-14 DIAGNOSIS — R26.2 DIFFICULTY WALKING: ICD-10-CM

## 2024-10-14 DIAGNOSIS — Z00.00 ENCOUNTER FOR ANNUAL HEALTH EXAMINATION: Primary | ICD-10-CM

## 2024-10-14 DIAGNOSIS — I10 HYPERTENSION, ESSENTIAL: Chronic | ICD-10-CM

## 2024-10-14 DIAGNOSIS — N18.32 STAGE 3B CHRONIC KIDNEY DISEASE: Chronic | ICD-10-CM

## 2024-10-14 DIAGNOSIS — R54 FRAIL ELDERLY: ICD-10-CM

## 2024-10-14 DIAGNOSIS — D69.2 SENILE PURPURA: ICD-10-CM

## 2024-10-14 DIAGNOSIS — M47.812 CERVICAL SPONDYLOSIS: ICD-10-CM

## 2024-10-14 DIAGNOSIS — Z12.11 SCREENING FOR COLON CANCER: ICD-10-CM

## 2024-10-14 DIAGNOSIS — H90.3 SENSORINEURAL HEARING LOSS (SNHL) OF BOTH EARS: ICD-10-CM

## 2024-10-14 DIAGNOSIS — Z12.39 ENCOUNTER FOR SCREENING FOR MALIGNANT NEOPLASM OF BREAST, UNSPECIFIED SCREENING MODALITY: ICD-10-CM

## 2024-10-14 LAB
ESTIMATED AVG GLUCOSE: 103 MG/DL (ref 68–131)
HBA1C MFR BLD: 5.2 % (ref 4–5.6)

## 2024-10-14 PROCEDURE — 99215 OFFICE O/P EST HI 40 MIN: CPT | Mod: PBBFAC,PO

## 2024-10-14 PROCEDURE — 99999 PR PBB SHADOW E&M-EST. PATIENT-LVL V: CPT | Mod: PBBFAC,,,

## 2024-10-14 PROCEDURE — 83036 HEMOGLOBIN GLYCOSYLATED A1C: CPT

## 2024-10-14 PROCEDURE — 36415 COLL VENOUS BLD VENIPUNCTURE: CPT | Mod: PO

## 2024-10-14 PROCEDURE — 99397 PER PM REEVAL EST PAT 65+ YR: CPT | Mod: GZ,S$PBB,,

## 2024-10-14 NOTE — PATIENT INSTRUCTIONS
Today:  Continue all medications as prescribed.  Blood work to check hemoglobin A1c.  Schedule mammogram and DEXA scan.  Fit kit provided in clinic.  Please return to any [facility] lab after specimen is collected.  Referral to PT/OT provided for gait training to improve balance.  Education on the shingles vaccine has been provided for your review.    Follow as needed or in 1 year for next annual exam.

## 2024-10-14 NOTE — PROGRESS NOTES
89 y.o. female here for annual exam.     Cholesterol: UTD  Vaccines: Influenza - UTD ; Tetanus - UTD, next due 2025; Prevnar 20 - UTD ; Zoster - UTD ; COVID - needs  Sexual Screening:  STD screening:  Eye exam: last eye exam within the last 6 months, wears glasses.   Mammogram: ordered   Gyn exam:no longer indicated, patient agrees.   Colonoscopy: [facility] ordered  DEXA: needs  A1c: needs    Exercise: She is completing PT currently.   Diet: Eats a mixture of home cooked meals, fast food, and at restaurants.     History of Present Illness      CHIEF COMPLAINT:  Ms. Lai presents today for annual exam.     HYPERTENSION:  She reports recent high blood pressure readings, resulting in hospital visits last Thursday and Friday. She recently restarted amlodipine and is currently taking amlodipine 5 mg, labetalol 100 mg (1.5 tablets daily), hydralazine 25 mg, and Tekturna (half in the morning and half at night) for blood pressure management. She recently visited a nephrologist for evaluation of declining kidney function.     HYPERLIPIDEMIA:  She is currently taking pravastatin and Zetia for management of high cholesterol.    MEDICATIONS:  Current medications include: amlodipine 5 mg, labetalol 100 mg (1.5 tablets daily), hydralazine 25 mg, Tekturna, pravastatin, Zetia, aspirin (baby) daily, Ativan 0.5 mg as needed for sleep, and Metamucil daily.    ALLERGIES:  She reports allergies to sulfa, clarithromycin, Flexeril, iodine, lisinopril, losartan, metoprolol, spironolactone, tramadol, verapamil, and Voltaren.    MEDICAL HISTORY:  She has a history of hypertension, hyperlipidemia, anxiety, and breast cancer.  While being treated for her breast cancer she also had a shingles rash.    SLEEP ISSUES:  She reports sleep issues, requiring Ativan 0.5 mg as needed.    MUSCULOSKELETAL CONCERNS:  She describes back pain when transitioning from sitting to standing, particularly after prolonged periods of sitting. She notes balance  problems, which she believes may be contributing to her back pain. She denies any improvement in these symptoms. She is currently attending rehabilitation twice weekly for back issues following two falls in March. She expresses satisfaction with the rehabilitation program, noting that it has been beneficial.    SURGICAL HISTORY:  She had knee surgery approximately one year ago in May.    DIET AND EXERCISE:  She reports minimal home cooking, primarily eating out. She occasionally prepares cereal for breakfast. She is currently attending rehabilitation twice weekly for back issues.    RECENT MEDICAL EVENTS:  She experienced two falls in March, which she describes as unusual. She denies any immediate repercussions from these falls but notes subsequent pain in her buttocks area, which led to her current rehabilitation program.    RECENT TESTS:  She recently had labs done at her nephrologist appointment. She also had a renal function panel performed on the 11th, which prompted the referral to the nephrologist due to concerns about her kidney function.    ROS:  Eyes: -vision changes  Musculoskeletal: -joint pain, +back pain, +muscle pain  Psychiatric: +sleep difficulty          Review of Systems  Physical Exam  Vitals reviewed.   Constitutional:       General: She is awake. She is not in acute distress.     Appearance: Normal appearance. She is well-developed and well-groomed. She is not ill-appearing, toxic-appearing or diaphoretic.   HENT:      Right Ear: Tympanic membrane, ear canal and external ear normal.      Left Ear: Tympanic membrane, ear canal and external ear normal.      Ears:      Comments: Wearing bilateral hearing aids.     Nose: Nose normal.      Mouth/Throat:      Mouth: Mucous membranes are moist.      Pharynx: Oropharynx is clear. No oropharyngeal exudate.   Eyes:      General: No scleral icterus.     Conjunctiva/sclera: Conjunctivae normal.      Pupils: Pupils are equal, round, and reactive to light.    Cardiovascular:      Rate and Rhythm: Normal rate and regular rhythm.      Pulses: Normal pulses.      Heart sounds: Normal heart sounds. No murmur heard.     No friction rub. No gallop.   Pulmonary:      Effort: No respiratory distress.      Breath sounds: Normal breath sounds. No wheezing, rhonchi or rales.   Abdominal:      General: Bowel sounds are normal. There is no distension.      Palpations: Abdomen is soft. There is no mass.      Tenderness: There is no abdominal tenderness. There is no guarding.   Musculoskeletal:         General: Normal range of motion.      Cervical back: Normal range of motion and neck supple.      Comments: Ambulates with the use of assistive device, walker.   Skin:     General: Skin is warm and dry.      Capillary Refill: Capillary refill takes less than 2 seconds.      Comments: Senile purpura to bilateral upper extremities.   Neurological:      Mental Status: She is alert and oriented to person, place, and time. Mental status is at baseline.      GCS: GCS eye subscore is 4. GCS verbal subscore is 5. GCS motor subscore is 6.   Psychiatric:         Behavior: Behavior normal. Behavior is cooperative.          Past Medical History:   Diagnosis Date    Anxiety     Arthritis     Basal cell carcinoma 09/2016    right post auricular neck     Basal cell carcinoma of right forearm 04/19/2023    R lower forearm    Breast cancer 1992    right    Cataract     Fibromyalgia     History of measles as a child     Hyperlipidemia     Hypertension     Personal history of colonic polyps     Pneumonia     SCC (squamous cell carcinoma) 2015    R chest    SCC (squamous cell carcinoma) 2016    left medial shoulder    SCC (squamous cell carcinoma) 2017    left knee    SCC (squamous cell carcinoma) 2022    L upper chest, R upper arm KA types    Shingles     Squamous cell carcinoma 2015    right forearm    Thyroid disease     Vaginitis      Past Surgical History:   Procedure Laterality Date    ADENOIDECTOMY       ARTHROPLASTY, KNEE, TOTAL, USING COMPUTER-ASSISTED NAVIGATION Right 5/22/2023    Procedure: ARTHROPLASTY, KNEE, TOTAL, USING COMPUTER-ASSISTED NAVIGATION: QUYEN: RIGHT: MARTHA - TRIATHALON;  Surgeon: Milton Cobian III, MD;  Location: [facility] OR;  Service: Orthopedics;  Laterality: Right;    BREAST BIOPSY Left     Excisional bx, benign    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    CARPAL TUNNEL RELEASE Right 3/16/2021    Procedure: RELEASE, CARPAL TUNNEL;  Surgeon: Barbara Aldridge MD;  Location: [facility] OR;  Service: Orthopedics;  Laterality: Right;    CATARACT EXTRACTION W/  INTRAOCULAR LENS IMPLANT Bilateral     EPIDURAL STEROID INJECTION N/A 2/24/2023    Procedure: HEIDI C7-T1;  Surgeon: Andi Cisneros DO;  Location: [facility] OR;  Service: Pain Management;  Laterality: N/A;    EPIDURAL STEROID INJECTION INTO CERVICAL SPINE N/A 6/10/2024    Procedure: C7-T1 HEIDI (toward the left);  Surgeon: Andi Cisneros DO;  Location: [facility] beds at the [facility]IN [facility];  Service: Pain Management;  Laterality: N/A;  RN sed w/Versed and Benadryl    EYE SURGERY      HAND SURGERY      INJECTION OF ANESTHETIC AGENT AROUND MEDIAL BRANCH NERVES INNERVATING CERVICAL FACET JOINT N/A 1/4/2022    Procedure: Cervical Medial Branch Block C5-6, C6-7 (No Sedation);  Surgeon: Himanshu Blackmon Jr., MD;  Location: [facility] PAIN MGT;  Service: Pain Management;  Laterality: N/A;    INJECTION OF ANESTHETIC AGENT AROUND MEDIAL BRANCH NERVES INNERVATING LUMBAR FACET JOINT Left 11/18/2021    Procedure: Cervical Medial Branch Block Left C5-6, C6-7 (IV Sedation);  Surgeon: Himanshu Blackmon Jr., MD;  Location: [facility] PAIN [facility]GT;  Service: Pain Management;  Laterality: Left;    JOINT REPLACEMENT Right     ELZBIETA    KNEE ARTHROPLASTY Left 8/10/2020    Procedure: ARTHROPLASTY, KNEE-ADE NEXGEN/CEMENTED TIBIA;  Surgeon: Kalin Pacheco MD;  Location: [facility] OR;  Service: Orthopedics;  Laterality: Left;    RADIOFREQUENCY THERMAL COAGULATION OF MEDIAL BRANCH OF POSTERIOR  RAMUS OF CERVICAL SPINAL NERVE Left 3/8/2022    Procedure: RADIOFREQUENCY THERMAL COAGULATION, NERVE, SPINAL, CERVICAL, LEFT C5-6 AND C6-7;  Surgeon: Himanshu Blackmon Jr., MD;  Location: [facility];  Service: Pain Management;  Laterality: Left;  NO PACEMAKER   ASA    thyriodectomy      partial    tonsillectomy      TONSILLECTOMY      TOTAL HIP ARTHROPLASTY      right    Yag  Left      Social History     Socioeconomic History    Marital status: Single   Occupational History     Employer: RETIRED   Tobacco Use    Smoking status: Never     Passive exposure: Never    Smokeless tobacco: Never   Substance and Sexual Activity    Alcohol use: Yes     Comment: socially - celebrations only    Drug use: Never    Sexual activity: Not Currently     Social Drivers of Health     Financial Resource Strain: Low Risk  (8/29/2023)    Overall Financial Resource Strain (CARDIA)     Difficulty of Paying Living Expenses: Not hard at all   Food Insecurity: No Food Insecurity (8/29/2023)    Hunger Vital Sign     Worried About Running Out of Food in the Last Year: Never true     Ran Out of Food in the Last Year: Never true   Transportation Needs: No Transportation Needs (8/29/2023)    PRAPARE - Transportation     Lack of Transportation (Medical): No     Lack of Transportation (Non-Medical): No   Physical Activity: Insufficiently Active (8/29/2023)    Exercise Vital Sign     Days of Exercise per Week: 7 days     Minutes of Exercise per Session: 20 min   Stress: No Stress Concern Present (8/29/2023)    [employer] [place] [employer] - Occupational Stress Questionnaire     Feeling of Stress : Only a little   Housing Stability: Low Risk  (8/29/2023)    Housing Stability Vital Sign     Unable to Pay for Housing in the Last Year: No     Number of Places Lived in the Last Year: 1     Unstable Housing in the Last Year: No     Review of patient's allergies indicates:   Allergen Reactions    Sulfa (sulfonamide antibiotics) Rash     Clarithromycin Other (See Comments)     Weak, extreme fatigue. dizziness    Flexeril [cyclobenzaprine] Other (See Comments)     Dizziness    Iodine and iodide containing products     Lisinopril Other (See Comments)     Cough and sensation of throat swelling/?angioedema    Losartan Rash    Metoprolol Swelling     Tightness in throat    Spironolactone      Throat tightening    Tramadol Other (See Comments)     Dizzy and weak    Verapamil (bulk) Palpitations    Voltaren [diclofenac sodium] Other (See Comments)     Drops blood pressure     Gerda Lai had no medications administered during this visit.    Assessment & Plan      PREVENTATIVE:  Discussed all age related screenings.  Discussed diet and exercise.  Discussed prevention strategies. Discussed vaccines in education provided.    SHINGLES VACCINATION:  - Emphasized the importance of Shingrix vaccine for preventing shingles recurrence and postherpetic neuralgia.    COLORECTAL CANCER SCREENING:  -Patient requests screening for colorectal cancer.  - Discussed the difference between FIT test and Cologuard for colorectal cancer screening.  - Ordered FIT kit for colorectal cancer screening.    OSTEOPOROSIS SCREENING:  - Clarified the purpose of DEXA scan for bone density measurement.  - Ordered DEXA scan for bone density.    FALL PREVENTION:  - Explained gait training and its potential benefits for improving balance and reducing fall risk.  - Referred to Physical therapy for gait training to improve balance and coordination.    HYPERTENSION:  - Continued amlodipine 5 mg daily for blood pressure.  - Continued labetalol 100 mg, 1.5 tablets at night.  - Continued hydralazine 25 mg daily.  -Continued Tekturna 300 mg daily.     MEDICATIONS/SUPPLEMENTS:  - Continued Ativan 0.5 mg as needed for sleep.  - Continued Zetia (ezetimibe) daily.  - Continued daily baby aspirin.    LABS:  - Ordered hemoglobin A1C.    BREAST CANCER SCREENING:  - Ordered mammogram.    FOLLOW UP:  -  Follow up in 1 year for annual visit.  - Contact office for any problems or concerns before next scheduled appointment.        This note was generated with the assistance of ambient listening technology. Verbal consent was obtained by the patient and accompanying visitor(s) for the recording of patient appointment to facilitate this note. I attest to having reviewed and edited the generated note for accuracy, though some syntax or spelling errors may persist. Please contact the author of this note for any clarification.          Tomas Torre,MSN, APRN, FNP-C  [facility], Internal Medicine  12:56 PM

## 2024-10-15 ENCOUNTER — CLINICAL SUPPORT (OUTPATIENT)
Dept: REHABILITATION | Facility: HOSPITAL | Age: 89
End: 2024-10-15
Payer: MEDICARE

## 2024-10-15 DIAGNOSIS — M25.651 IMPAIRED RANGE OF MOTION OF RIGHT HIP: Primary | ICD-10-CM

## 2024-10-15 DIAGNOSIS — R26.89 DECREASED BACK MOBILITY: ICD-10-CM

## 2024-10-15 DIAGNOSIS — M62.9 HAMSTRING TIGHTNESS OF BOTH LOWER EXTREMITIES: ICD-10-CM

## 2024-10-15 PROCEDURE — 97112 NEUROMUSCULAR REEDUCATION: CPT | Mod: KX,PO

## 2024-10-15 NOTE — PROGRESS NOTES
[facility]   Physical Therapy Treatment Note      Name: Gerda Lai  Clinic Number: [id number]    Therapy Diagnosis:   No diagnosis found.      Physician: Andi Cisneros,*    Visit Date: 10/15/2024    Physician Orders: PT Eval and Treat right gluteal   Medical Diagnosis from Referral:   Piriformis syndrome of right side [G57.01], Lumbar spondylosis [M47.816], Cervical radiculopathy [M54.12]   Evaluation Date: 8/13/2024  Authorization Period Expiration: 7/19/2025  Plan of Care Expiration: 11/13/2024  Progress Note Due: 9/13/2024  Visit # / Visits authorized: 16 /20       Foto  Date / Visit# Score    #1/3 8/13/2024=1 48%   #2/3 9/16/2024=2   56%   #3/3          Precautions: Standard     Time In: 1320 (late arrival)  Time Out: 1400    Total Appointment Time (timed & untimed codes): 40  minutes    PTA Visit #: 0/5       Subjective     Patient reports: She has been to several Dr's last Wednesday, Thursday and Friday due to not feeling well and had high blood pressure.   Pt reported that she is late because she thought that her appointment was at 1:30 today.    She was somewhat compliant with her HEP.  Response to previous treatment: did pretty good.   Functional change: sitting too long creates pain > 15 min when I try to stand.     Pain: 0 / 10  Location: R hip     Objective      BP  111/54, 124/64, 96/52.    Treatment     .BOLD INDICATES ACTIVITIES PERFORMED / DISCUSSED      Gerda received the treatments listed below:    Nu-step x 7 minutes  Recumbent bike 7'     therapeutic exercises to develop strength, endurance, ROM, flexibility, posture, and core stabilization for 02 minutes including:  SUPINE  Lower trunk rotation 10 x2    PRONE   -sustained extension 3'     SIDE LYING     neuromuscular re-education activities to improve: Balance, Coordination, Kinesthetic, Sense, Proprioception, and Posture for 38 minutes. The following activities were included:    SUPINE   Hook lying B hip  "abduction 10 x 3 with yellow oval band     Bridges 10 x 3 with R knee flexed greater than L   Hip flexion with stretch x15  Gluteal / quad  sets / ball squeeze  hold 10" x 15     SIDE LYING   -open book x15   -clams x30 YOB  -hydrants x30 YOB              PRONE   -leg lift x15    STANDING  -NB on airex with head turnes 30" x 2   -SL stand (static) 20" x 3   -Tandem balance 20" x 2 on R & L   -leg swings x15     manual therapy techniques: Joint mobilizations, Manual traction, and Soft tissue Mobilization were applied to the: lumbar spine for 0 minutes, including:      therapeutic activities to improve functional performance for  15 minutes, including:  -squats x20  -sit to stand 1x10, 1x8  -steps x15 ea   -ambulation x1 around the clinic w/RW     gait training to improve functional mobility and safety for 00  minutes, including:      Patient Education and Home Exercises       Education provided: discussion regarding patients symptoms today and weather she should participate in the exercises today.  Strategy for todays routine discussed.  - Pt to continue with her HEP    Written Home Exercises Provided: Yes. Exercises were reviewed and Gerda was able to demonstrate them prior to the end of the session.  Gerda demonstrated good  understanding of the education provided. See Electronic Medical Record under Patient Instructions for exercises provided during therapy sessions    Assessment   Pt reported thtt she has not been feeling well, but did not wish to miss her Physical Therapy appointment.  Pt did arrive late and was not able to complete all of her usual exercises.  Her blood pressure ran a little lower than her norm - reported by Pt.  She was able to tolerate all Tx activities without significant difficulty.    Gerda Is progressing well towards her goals.   Patient prognosis is Good.     Patient will continue to benefit from skilled outpatient physical therapy to address the deficits listed in the problem list " box on initial evaluation, provide pt/family education and to maximize pt's level of independence in the home and community environment.     Patient's spiritual, cultural and educational needs considered and pt agreeable to plan of care and goals.     Anticipated barriers to physical therapy: scheduling     Goals:   Short Term Goals: 5 weeks   1. Pts pain decreased 20 - 40% for improved functional mobility and quality of life ONGOING  2. Pts PROM in hip flexion and extension increased by 10 degrees to enhance AROM and functional mobility  ONGOING  3. Pts strength in the hip musculature increased by 1/2 grade to facilitate improved active mobility and functional stability ONGOING  4. Pt to exhibit correct return demonstration of exercises for self-management and independence with HEP ONGOING  5. Pt to demonstrate enhanced neuromuscular response  with single leg static balance for improved functional mobility and gait pattern  ONGOING  6. Pt to experience 50% or more reduced interruption of sleep due to reduced pain for improved quality of life. ONGOING     Long Term Goals : 10 weeks   1.  Pts pain level decreased to 75% or greater for with siting for restoring functional mobility and ADL. ONGOING  2. Pts AROM in hip flexion, extension, abduction increased by 10 degrees to restore functional mobility and ADL  ONGOING  3. Pts strength in the hip musculature increased by one grade to facilitate improved active mobility and functional stability  ONGOING  4. Pt will be independent with HEP for self-management. ONGOING   5. Pt to exhibit dynamic stability on the RLE / LLE for stable functional mobility and gait. ONGOING   6. Pt to demonstrate ambulation w/o pain to improve gait pattern with a cane. ONGOING   Plan     Progress Physical Therapy program to assist Pt with managing her lower back   Sx.    Ebenezer Sanchez, PT

## 2024-10-16 LAB
ALDOST SERPL-MCNC: 13.1 NG/DL
ALDOST/RENIN PLAS-RTO: 131 RATIO
RENIN PLAS-CCNC: 0.1 NG/ML/HR

## 2024-10-17 ENCOUNTER — TELEPHONE (OUTPATIENT)
Dept: CARDIOLOGY | Facility: CLINIC | Age: 89
End: 2024-10-17
Payer: MEDICARE

## 2024-10-17 ENCOUNTER — APPOINTMENT (OUTPATIENT)
Dept: RADIOLOGY | Facility: CLINIC | Age: 89
End: 2024-10-17
Payer: MEDICARE

## 2024-10-17 ENCOUNTER — CLINICAL SUPPORT (OUTPATIENT)
Dept: REHABILITATION | Facility: HOSPITAL | Age: 89
End: 2024-10-17
Payer: MEDICARE

## 2024-10-17 DIAGNOSIS — R26.89 DECREASED BACK MOBILITY: ICD-10-CM

## 2024-10-17 DIAGNOSIS — I10 HYPERTENSION, ESSENTIAL: Chronic | ICD-10-CM

## 2024-10-17 DIAGNOSIS — M62.9 HAMSTRING TIGHTNESS OF BOTH LOWER EXTREMITIES: ICD-10-CM

## 2024-10-17 DIAGNOSIS — Z78.0 ASYMPTOMATIC MENOPAUSAL STATE: ICD-10-CM

## 2024-10-17 DIAGNOSIS — M25.651 IMPAIRED RANGE OF MOTION OF RIGHT HIP: Primary | ICD-10-CM

## 2024-10-17 PROCEDURE — 97112 NEUROMUSCULAR REEDUCATION: CPT | Mod: PO | Performed by: PHYSICAL THERAPIST

## 2024-10-17 PROCEDURE — 97530 THERAPEUTIC ACTIVITIES: CPT | Mod: PO | Performed by: PHYSICAL THERAPIST

## 2024-10-17 PROCEDURE — 77080 DXA BONE DENSITY AXIAL: CPT | Mod: TC,PO

## 2024-10-17 PROCEDURE — 97110 THERAPEUTIC EXERCISES: CPT | Mod: PO | Performed by: PHYSICAL THERAPIST

## 2024-10-17 RX ORDER — LORAZEPAM 0.5 MG/1
TABLET ORAL
Qty: 30 TABLET | Refills: 0 | Status: SHIPPED | OUTPATIENT
Start: 2024-10-17

## 2024-10-17 NOTE — TELEPHONE ENCOUNTER
No care due was identified.  [facility] Embedded Care Due Messages. Reference number: [id number].   10/17/2024 11:50:40 AM CDT

## 2024-10-17 NOTE — TELEPHONE ENCOUNTER
"Pt calling to report /47 this AM. Pt states  yesterday when arrived to appointment for PT .Pt states she started Amlodipine 5mg a few days ago as instructed by Holli Lara PA-C. Pt states "I feel lousy when my blood pressure is 115". Pt asking if dose of amlodipine should be adjusted. Please advise.  "

## 2024-10-17 NOTE — PROGRESS NOTES
[facility]   Physical Therapy Treatment Note      Name: Gerda Lai  Clinic Number: [id number]    Therapy Diagnosis:   Encounter Diagnoses   Name Primary?    Impaired range of motion of right hip Yes    Hamstring tightness of both lower extremities     Decreased back mobility        Physician: Andi Cisneros,*    Visit Date: 10/17/2024    Physician Orders: PT Eval and Treat right gluteal   Medical Diagnosis from Referral:   Piriformis syndrome of right side [G57.01], Lumbar spondylosis [M47.816], Cervical radiculopathy [M54.12]   Evaluation Date: 8/13/2024  Authorization Period Expiration: 7/19/2025  Plan of Care Expiration: 11/13/2024  Progress Note Due: 9/13/2024  Visit # / Visits authorized: 17 /20       Foto  Date / Visit# Score    #1/3 8/13/2024=1 48%   #2/3 9/16/2024=2   56%   #3/3          Precautions: Standard     Time In: 1537  Time Out: 1    Total Appointment Time (timed & untimed codes):  minutes    PTA Visit #: 0/5       Subjective     Patient reports: she is feeling better. BP was low at my last visit and this AM but feeling better now. Th e medicine needed to be adjusted.  She was not provided with home exercise program at initial evaluation.  Response to previous treatment: did pretty good.   Functional change: sitting too long creates pain > 15 min when I try to stand.     Pain: 0 / 10  Location: NA    Objective       / 69, [place] 59    Treatment     .BOLD INDICATES ACTIVITIES PERFORMED / DISCUSSED      Gerda received the treatments listed below:    Nu-step x 7 minutes  Recumbent bike 7'     therapeutic exercises to develop strength, endurance, ROM, flexibility, posture, and core stabilization for 12 minutes including:  SUPINE  Lower trunk rotation 10 x2    PRONE   -sustained extension 3'     SIDE LYING       neuromuscular re-education activities to improve: Balance, Coordination, Kinesthetic, Sense, Proprioception, and Posture for  35 minutes. The following  "activities were included:    SUPINE   Hook lying B hip abduction 10 x 3 with yellow oval band     Bridges 10 x 3 with R knee flexed greater than L   Hip flexion with stretch x15  Gluteal / quad  sets / ball squeeze  hold 10" x 15     SIDE LYING   -open book x15   -clams x30 YOB  -hydrants x30 YOB              PRONE   -leg lift x15    STANDING  -NB on airex with head turnes 30" x 2   -SL stand (static) 20" x 3   -Tandem balance 20" x 3   -leg swings x15     manual therapy techniques: Joint mobilizations, Manual traction, and Soft tissue Mobilization were applied to the: lumbar spine for 0 minutes, including:      therapeutic activities to improve functional performance for  10 minutes, including:  -squats x20  -sit to stand 2x10  -steps x15 ea   -ambulation x1 around the clinic w/RW     gait training to improve functional mobility and safety for 00  minutes, including:      Patient Education and Home Exercises       Education provided: discussion regarding patients symptoms today and weather she should participate in the exercises today.  Strategy for todays routine discussed.  - Pt to continue with her HEP    Written Home Exercises Provided: Yes. Exercises were reviewed and Gerda was able to demonstrate them prior to the end of the session.  Gerda demonstrated good  understanding of the education provided. See Electronic Medical Record under Patient Instructions for exercises provided during therapy sessions    Assessment     The patient exhibited fair tolerance to the exercises today. She had good BP and did not experience any dizziness or syncopic episodes.      Gerda Is progressing well towards her goals.   Patient prognosis is Good.     Patient will continue to benefit from skilled outpatient physical therapy to address the deficits listed in the problem list box on initial evaluation, provide pt/family education and to maximize pt's level of independence in the home and community environment.     Patient's " spiritual, cultural and educational needs considered and pt agreeable to plan of care and goals.     Anticipated barriers to physical therapy: scheduling     Goals:   Short Term Goals: 5 weeks   1. Pts pain decreased 20 - 40% for improved functional mobility and quality of life ONGOING  2. Pts PROM in hip flexion and extension increased by 10 degrees to enhance AROM and functional mobility  ONGOING  3. Pts strength in the hip musculature increased by 1/2 grade to facilitate improved active mobility and functional stability ONGOING  4. Pt to exhibit correct return demonstration of exercises for self-management and independence with HEP ONGOING  5. Pt to demonstrate enhanced neuromuscular response  with single leg static balance for improved functional mobility and gait pattern  ONGOING  6. Pt to experience 50% or more reduced interruption of sleep due to reduced pain for improved quality of life. ONGOING     Long Term Goals : 10 weeks   1.  Pts pain level decreased to 75% or greater for with siting for restoring functional mobility and ADL. ONGOING  2. Pts AROM in hip flexion, extension, abduction increased by 10 degrees to restore functional mobility and ADL  ONGOING  3. Pts strength in the hip musculature increased by one grade to facilitate improved active mobility and functional stability  ONGOING  4. Pt will be independent with HEP for self-management. ONGOING   5. Pt to exhibit dynamic stability on the RLE / LLE for stable functional mobility and gait. ONGOING   6. Pt to demonstrate ambulation w/o pain to improve gait pattern with a cane. ONGOING   Plan     Progress Physical Therapy program to assist Pt with managing her lower back   Sx.    Albert Mills, PT

## 2024-10-17 NOTE — TELEPHONE ENCOUNTER
Skip amlodipine tonight then resume at 1/2 a tab every night. Continue the same dose of labetalol and aliskiren

## 2024-10-18 LAB
METANEPH FREE SERPL-MCNC: 77 PG/ML
METANEPHS SERPL-MCNC: 409 PG/ML
NORMETANEPH FREE SERPL-MCNC: 332 PG/ML

## 2024-10-22 ENCOUNTER — CLINICAL SUPPORT (OUTPATIENT)
Dept: REHABILITATION | Facility: HOSPITAL | Age: 89
End: 2024-10-22
Payer: MEDICARE

## 2024-10-22 DIAGNOSIS — M25.651 IMPAIRED RANGE OF MOTION OF RIGHT HIP: Primary | ICD-10-CM

## 2024-10-22 DIAGNOSIS — R26.89 DECREASED BACK MOBILITY: ICD-10-CM

## 2024-10-22 DIAGNOSIS — M62.9 HAMSTRING TIGHTNESS OF BOTH LOWER EXTREMITIES: ICD-10-CM

## 2024-10-22 PROCEDURE — 97112 NEUROMUSCULAR REEDUCATION: CPT | Mod: KX,PO

## 2024-10-22 NOTE — PROGRESS NOTES
[facility]   Physical Therapy Treatment Note      Name: Gerda Lai  Clinic Number: [id number]    Therapy Diagnosis:   Encounter Diagnoses   Name Primary?    Impaired range of motion of right hip Yes    Hamstring tightness of both lower extremities     Decreased back mobility          Physician: Andi Cisneros,*    Visit Date: 10/22/2024    Physician Orders: PT Eval and Treat right gluteal   Medical Diagnosis from Referral:   Piriformis syndrome of right side [G57.01], Lumbar spondylosis [M47.816], Cervical radiculopathy [M54.12]   Evaluation Date: 8/13/2024  Authorization Period Expiration: 7/19/2025  Plan of Care Expiration: 11/13/2024  Progress Note Due: 9/13/2024  Visit # / Visits authorized: 18 /20       Foto  Date / Visit# Score    #1/3 8/13/2024=1 48%   #2/3 9/16/2024=2   56%   #3/3          Precautions: Standard     Time In: 1300  Time Out: 1    Total Appointment Time (timed & untimed codes):  minutes    PTA Visit #: 0/5       Subjective     Patient reports: she is feeling OK.  She checked her BP before she came to [place] today and it was a little low.  She was not provided with home exercise program at initial evaluation.  Response to previous treatment: did pretty good.   Functional change: sitting too long creates pain > 15 min when I try to stand.     Pain: 0 / 10  Location: NA    Objective       / 64, [place] 60    Treatment     .BOLD INDICATES ACTIVITIES PERFORMED / DISCUSSED      Gerda received the treatments listed below:    Nu-step x 7 minutes  Recumbent bike 7'     therapeutic exercises to develop strength, endurance, ROM, flexibility, posture, and core stabilization for 12 minutes including:  SUPINE  Lower trunk rotation 10 x2    PRONE   -sustained extension 3'     SIDE LYING       neuromuscular re-education activities to improve: Balance, Coordination, Kinesthetic, Sense, Proprioception, and Posture for  35 minutes. The following activities were  "included:    SUPINE   Hook lying B hip abduction 10 x 3 with yellow oval band     Bridges 10 x 3 with R knee flexed greater than L   Hip flexion with stretch x15  Gluteal / quad  sets / ball squeeze  hold 10" x 15     SIDE LYING   -open book x15   -clams x30 GOB  -hydrants x30 GOB              PRONE   -leg lift x15    STANDING  -NB on airex with head turnes 30" x 2   -SL stand (static) 20" x 3   -Tandem balance 20" x 3   -leg swings x15     manual therapy techniques: Joint mobilizations, Manual traction, and Soft tissue Mobilization were applied to the: lumbar spine for 0 minutes, including:      therapeutic activities to improve functional performance for  10 minutes, including:  -squats x20  -sit to stand 2x10  -steps x15 ea   -ambulation x1 around the clinic w/RW     gait training to improve functional mobility and safety for 00  minutes, including:      Patient Education and Home Exercises       Education provided: Pt to continue with her HEP and monitor ehr blood pressure.  - Pt to continue with her HEP    Written Home Exercises Provided: Yes. Exercises were reviewed and Gerda was able to demonstrate them prior to the end of the session.  Gerda demonstrated good  understanding of the education provided. See Electronic Medical Record under Patient Instructions for exercises provided during therapy sessions    Assessment     Pt with no specific complaints prior to Tx today.  Her blood pressure was a little on the low side, but she had no c/o dizziness prior to, during, or following exercise.  Gerda Is progressing well towards her goals.   Patient prognosis is Good.     Patient will continue to benefit from skilled outpatient physical therapy to address the deficits listed in the problem list box on initial evaluation, provide pt/family education and to maximize pt's level of independence in the home and community environment.     Patient's spiritual, cultural and educational needs considered and pt agreeable " to plan of care and goals.     Anticipated barriers to physical therapy: scheduling     Goals:   Short Term Goals: 5 weeks   1. Pts pain decreased 20 - 40% for improved functional mobility and quality of life ONGOING  2. Pts PROM in hip flexion and extension increased by 10 degrees to enhance AROM and functional mobility  ONGOING  3. Pts strength in the hip musculature increased by 1/2 grade to facilitate improved active mobility and functional stability ONGOING  4. Pt to exhibit correct return demonstration of exercises for self-management and independence with HEP ONGOING  5. Pt to demonstrate enhanced neuromuscular response  with single leg static balance for improved functional mobility and gait pattern  ONGOING  6. Pt to experience 50% or more reduced interruption of sleep due to reduced pain for improved quality of life. ONGOING     Long Term Goals : 10 weeks   1.  Pts pain level decreased to 75% or greater for with siting for restoring functional mobility and ADL. ONGOING  2. Pts AROM in hip flexion, extension, abduction increased by 10 degrees to restore functional mobility and ADL  ONGOING  3. Pts strength in the hip musculature increased by one grade to facilitate improved active mobility and functional stability  ONGOING  4. Pt will be independent with HEP for self-management. ONGOING   5. Pt to exhibit dynamic stability on the RLE / LLE for stable functional mobility and gait. ONGOING   6. Pt to demonstrate ambulation w/o pain to improve gait pattern with a cane. ONGOING   Plan     Progress Physical Therapy program to assist Pt with managing her lower back   Sx.    Ebenezer Sanchez, PT

## 2024-10-23 ENCOUNTER — HOSPITAL ENCOUNTER (OUTPATIENT)
Dept: RADIOLOGY | Facility: HOSPITAL | Age: 89
Discharge: HOME | End: 2024-10-23
Payer: MEDICARE

## 2024-10-23 DIAGNOSIS — Z12.39 ENCOUNTER FOR SCREENING FOR MALIGNANT NEOPLASM OF BREAST, UNSPECIFIED SCREENING MODALITY: ICD-10-CM

## 2024-10-23 DIAGNOSIS — Z12.31 ENCOUNTER FOR SCREENING MAMMOGRAM FOR MALIGNANT NEOPLASM OF BREAST: ICD-10-CM

## 2024-10-23 PROCEDURE — 77063 BREAST TOMOSYNTHESIS BI: CPT | Mod: TC,PO

## 2024-10-23 PROCEDURE — 77067 SCR MAMMO BI INCL CAD: CPT | Mod: TC,PO

## 2024-10-24 ENCOUNTER — CLINICAL SUPPORT (OUTPATIENT)
Dept: REHABILITATION | Facility: HOSPITAL | Age: 89
End: 2024-10-24
Payer: MEDICARE

## 2024-10-24 DIAGNOSIS — M62.9 HAMSTRING TIGHTNESS OF BOTH LOWER EXTREMITIES: ICD-10-CM

## 2024-10-24 DIAGNOSIS — R26.89 DECREASED BACK MOBILITY: ICD-10-CM

## 2024-10-24 DIAGNOSIS — M25.651 IMPAIRED RANGE OF MOTION OF RIGHT HIP: Primary | ICD-10-CM

## 2024-10-24 PROCEDURE — 97112 NEUROMUSCULAR REEDUCATION: CPT | Mod: PO | Performed by: PHYSICAL THERAPIST

## 2024-10-24 PROCEDURE — 97110 THERAPEUTIC EXERCISES: CPT | Mod: PO | Performed by: PHYSICAL THERAPIST

## 2024-10-24 NOTE — PROGRESS NOTES
[facility]   Physical Therapy Treatment Note      Name: Gerda Lai  Clinic Number: [id number]    Therapy Diagnosis:   Encounter Diagnoses   Name Primary?    Impaired range of motion of right hip Yes    Hamstring tightness of both lower extremities     Decreased back mobility          Physician: Andi Cisneros,*    Visit Date: 10/24/2024    Physician Orders: PT Eval and Treat right gluteal   Medical Diagnosis from Referral:   Piriformis syndrome of right side [G57.01], Lumbar spondylosis [M47.816], Cervical radiculopathy [M54.12]   Evaluation Date: 8/13/2024  Authorization Period Expiration: 7/19/2025  Plan of Care Expiration: 11/13/2024  Progress Note Due: 9/13/2024  Visit # / Visits authorized: 19 / 20       Foto  Date / Visit# Score    #1/3 8/13/2024=1 48%   #2/3 9/16/2024=2   56%   #3/3          Precautions: Standard     Time In: 1450       was not checked in until 1444  Time Out: 1550   Total Appointment Time (timed & untimed codes):  60 minutes    PTA Visit #: 0/5       Subjective     Patient reports: she is feeling OK.  She checked her BP before she came to Tx today and it was a little low.  She was not provided with home exercise program at initial evaluation.  Response to previous treatment: did pretty good.   Functional change: sitting too long creates pain > 15 min when I try to stand.     Pain: 0 / 10  Location: NA    Objective       / 64, [place] 60    Treatment     .BOLD INDICATES ACTIVITIES PERFORMED / DISCUSSED      Gerda received the treatments listed below:    Nu-step x 7 minutes  Recumbent bike 7'     therapeutic exercises to develop strength, endurance, ROM, flexibility, posture, and core stabilization for 12 minutes including:  SUPINE  Lower trunk rotation 10 x2    PRONE   -sustained extension 3'     SIDE LYING       neuromuscular re-education activities to improve: Balance, Coordination, Kinesthetic, Sense, Proprioception, and Posture for  40 minutes.  "The following activities were included:    SUPINE   Hook lying B hip abduction 10 x 3 with yellow oval band     Bridges 10 x 3 with R knee flexed greater than L   Hip flexion with stretch x15  Gluteal / quad  sets / ball squeeze  hold 10" x 15     SIDE LYING   -open book x15   -clams x30 GOB  -hydrants x30 GOB              PRONE   -leg lift x15    STANDING  -NB on airex with head turnes 30" x 2   -SL stand (static) 20" x 3   -Tandem balance 20" x 3   -leg swings x15     manual therapy techniques: Joint mobilizations, Manual traction, and Soft tissue Mobilization were applied to the: lumbar spine for 0 minutes, including:      therapeutic activities to improve functional performance for  10 minutes, including:  -squats x20  -sit to stand 2x10  -steps x15 ea   -ambulation x1 around the clinic w/RW     gait training to improve functional mobility and safety for 00  minutes, including:      Patient Education and Home Exercises       Education provided: Pt to continue with her HEP and monitor ehr blood pressure.  - Pt to continue with her HEP    Written Home Exercises Provided: Yes. Exercises were reviewed and Gerda was able to demonstrate them prior to the end of the session.  Gerda demonstrated good  understanding of the education provided. See Electronic Medical Record under Patient Instructions for exercises provided during therapy sessions    Assessment     Ms Lorenz is able to execute all activities with directives.  She has discomfort in the piriformis muscle distribution of the right gluteal area. She is at a point of max benefit but is not very compliant with her HEP.     Patient prognosis is Good.     Patient will continue to benefit from skilled outpatient physical therapy to address the deficits listed in the problem list box on initial evaluation, provide pt/family education and to maximize pt's level of independence in the home and community environment.     Patient's spiritual, cultural and educational " needs considered and pt agreeable to plan of care and goals.     Anticipated barriers to physical therapy: scheduling     Goals:   Short Term Goals: 5 weeks   1. Pts pain decreased 20 - 40% for improved functional mobility and quality of life ONGOING  2. Pts PROM in hip flexion and extension increased by 10 degrees to enhance AROM and functional mobility  ONGOING  3. Pts strength in the hip musculature increased by 1/2 grade to facilitate improved active mobility and functional stability ONGOING  4. Pt to exhibit correct return demonstration of exercises for self-management and independence with HEP ONGOING  5. Pt to demonstrate enhanced neuromuscular response  with single leg static balance for improved functional mobility and gait pattern  ONGOING  6. Pt to experience 50% or more reduced interruption of sleep due to reduced pain for improved quality of life. ONGOING     Long Term Goals : 10 weeks   1.  Pts pain level decreased to 75% or greater for with siting for restoring functional mobility and ADL. ONGOING  2. Pts AROM in hip flexion, extension, abduction increased by 10 degrees to restore functional mobility and ADL  ONGOING  3. Pts strength in the hip musculature increased by one grade to facilitate improved active mobility and functional stability  ONGOING  4. Pt will be independent with HEP for self-management. ONGOING   5. Pt to exhibit dynamic stability on the RLE / LLE for stable functional mobility and gait.MET   6. Pt to demonstrate ambulation w/o pain to improve gait pattern with a cane. ONGOING   Plan     Progress Physical Therapy program to assist Pt with managing her lower back   Sx.    Albert Mills, PT

## 2024-10-28 ENCOUNTER — OFFICE VISIT (OUTPATIENT)
Dept: PAIN MEDICINE | Facility: CLINIC | Age: 89
End: 2024-10-28
Payer: MEDICARE

## 2024-10-28 VITALS
HEART RATE: 56 BPM | BODY MASS INDEX: 23.83 KG/M2 | HEIGHT: 63 IN | DIASTOLIC BLOOD PRESSURE: 65 MMHG | WEIGHT: 134.5 LBS | SYSTOLIC BLOOD PRESSURE: 110 MMHG

## 2024-10-28 DIAGNOSIS — M79.10 MYALGIA: ICD-10-CM

## 2024-10-28 DIAGNOSIS — M54.12 CERVICAL RADICULOPATHY: ICD-10-CM

## 2024-10-28 DIAGNOSIS — M25.551 RIGHT HIP PAIN: ICD-10-CM

## 2024-10-28 DIAGNOSIS — M47.816 LUMBAR SPONDYLOSIS: ICD-10-CM

## 2024-10-28 DIAGNOSIS — M54.16 LUMBAR RADICULOPATHY, CHRONIC: Primary | ICD-10-CM

## 2024-10-28 DIAGNOSIS — G57.01 PIRIFORMIS SYNDROME OF RIGHT SIDE: ICD-10-CM

## 2024-10-28 PROCEDURE — 99999 PR PBB SHADOW E&M-EST. PATIENT-LVL IV: CPT | Mod: PBBFAC,,, | Performed by: STUDENT IN AN ORGANIZED HEALTH CARE EDUCATION/TRAINING PROGRAM

## 2024-10-28 PROCEDURE — 99214 OFFICE O/P EST MOD 30 MIN: CPT | Mod: PBBFAC | Performed by: STUDENT IN AN ORGANIZED HEALTH CARE EDUCATION/TRAINING PROGRAM

## 2024-10-29 ENCOUNTER — CLINICAL SUPPORT (OUTPATIENT)
Dept: REHABILITATION | Facility: HOSPITAL | Age: 89
End: 2024-10-29
Payer: MEDICARE

## 2024-10-29 ENCOUNTER — OFFICE VISIT (OUTPATIENT)
Dept: CARDIOLOGY | Facility: CLINIC | Age: 89
End: 2024-10-29
Payer: MEDICARE

## 2024-10-29 VITALS
HEART RATE: 58 BPM | SYSTOLIC BLOOD PRESSURE: 152 MMHG | DIASTOLIC BLOOD PRESSURE: 55 MMHG | HEIGHT: 63 IN | WEIGHT: 134 LBS | BODY MASS INDEX: 23.74 KG/M2

## 2024-10-29 DIAGNOSIS — R00.1 SINUS BRADYCARDIA: ICD-10-CM

## 2024-10-29 DIAGNOSIS — I10 PRIMARY HYPERTENSION: Primary | ICD-10-CM

## 2024-10-29 DIAGNOSIS — I70.0 AORTIC ATHEROSCLEROSIS: ICD-10-CM

## 2024-10-29 DIAGNOSIS — E78.00 PURE HYPERCHOLESTEROLEMIA: ICD-10-CM

## 2024-10-29 DIAGNOSIS — R26.89 DECREASED BACK MOBILITY: ICD-10-CM

## 2024-10-29 DIAGNOSIS — N18.31 STAGE 3A CHRONIC KIDNEY DISEASE: ICD-10-CM

## 2024-10-29 DIAGNOSIS — M25.651 IMPAIRED RANGE OF MOTION OF RIGHT HIP: Primary | ICD-10-CM

## 2024-10-29 DIAGNOSIS — M62.9 HAMSTRING TIGHTNESS OF BOTH LOWER EXTREMITIES: ICD-10-CM

## 2024-10-29 DIAGNOSIS — I65.23 BILATERAL CAROTID ARTERY STENOSIS: ICD-10-CM

## 2024-10-29 PROCEDURE — 97110 THERAPEUTIC EXERCISES: CPT | Mod: PO | Performed by: PHYSICAL THERAPIST

## 2024-10-29 PROCEDURE — 97112 NEUROMUSCULAR REEDUCATION: CPT | Mod: PO | Performed by: PHYSICAL THERAPIST

## 2024-10-29 NOTE — PROGRESS NOTES
The patient location is: Home, in [place].  The chief complaint leading to consultation is: Hypertension    Visit type:TELE AUDIOVISUAL  Total time spent with patient: 21 minutes. In view of delay on my part and rescheduling the appointment for later in the day, only an audio recording was performed  Each patient to whom he or she provides medical services by telemedicine is:  (1) informed of the relationship between the physician and patient and the respective role of any other health care provider with respect to management of the patient; and (2) notified that he or she may decline to receive medical services by telemedicine and may withdraw from such care at any time.   Subjective:   Chief Complaint:  Gerda Lai is a 89 y.o. female who presents for follow-up of Hypertension      Problem List and HPI:   Hypertension  - intolerance to multiple antihypertensive meds  - wide auscultatory gap  Right subclavian artery stenosis  Bilateral Carotid artery disease, 70-79%  Breast cancer    She is currently taking aliskiren 150 mg twice a day. BP is elevated.  She has an auscultatory gap which sometimes causes her SBP to be underestimated.  She does not report angina or shortness of breath with exertion.  . She does not report orthopnea, PND or edema.  She does not report symptoms suggestive of a TIA or stroke.      Review of patient's allergies indicates:   Allergen Reactions    Sulfa (sulfonamide antibiotics) Rash    Clarithromycin Other (See Comments)     Weak, extreme fatigue. dizziness    Flexeril [cyclobenzaprine] Other (See Comments)     Dizziness    Iodine and iodide containing products     Lisinopril Other (See Comments)     Cough and sensation of throat swelling/?angioedema    Losartan Rash    Metoprolol Swelling     Tightness in throat    Spironolactone      Throat tightening    Tramadol Other (See Comments)     Dizzy and weak    Verapamil (bulk) Palpitations    Voltaren [diclofenac sodium] Other (See  "Comments)     Drops blood pressure        Current Outpatient Medications   Medication Sig    acetaminophen (TYLENOL) 650 MG TbSR Take 1 tablet (650 mg total) by mouth every 8 (eight) hours.    aliskiren (TEKTURNA) 300 MG Tab TAKE 1 TABLET BY MOUTH EVERY DAY (Patient taking differently: Take 150 mg by mouth 2 (two) times a day.)    amLODIPine (NORVASC) 5 MG tablet Take 1 tablet (5 mg total) by mouth once daily.    aspirin (ECOTRIN) 81 MG EC tablet TAKE 1 TABLET BY MOUTH EVERY DAY    b complex vitamins tablet Take 1 tablet by mouth once daily.    coenzyme Q10 100 mg capsule Take 100 mg by mouth once daily.    ezetimibe (ZETIA) 10 mg tablet Take 1 tablet (10 mg total) by mouth once daily.    glucosamine-chondroitin 500-400 mg tablet Take 1 tablet by mouth once daily.     hydrALAZINE (APRESOLINE) 25 MG tablet Take 1 tablet (25 mg total) by mouth 2 (two) times daily as needed (for systolic blood pressure >160).    labetaloL (NORMODYNE) 100 MG tablet TAKE ONE AND ONE-HALF TABLETS BY MOUTH EVERY 12 HOURS    LORazepam (ATIVAN) 0.5 MG tablet TAKE 1/2 TABLET TO 1 TABLET BY MOUTH EVERY 12 HOURS AS NEEDED FOR ANXIETY    multivitamin capsule Take 1 capsule by mouth once daily.    omeprazole (PRILOSEC OTC) 20 MG tablet Take 20 mg by mouth once daily.    pravastatin (PRAVACHOL) 80 MG tablet TAKE 1 TABLET (80 MG TOTAL) BY MOUTH ONCE DAILY.    psyllium husk, aspartame, (METAMUCIL) 3.4 gram PwPk packet Take 1 packet by mouth once daily.    TURMERIC ORAL Take 500 mg by mouth once daily.     vitamin D (VITAMIN D3) 1000 units Tab Take 1,000 Units by mouth once daily.     No current facility-administered medications for this visit.       Social history:  Gerda Lai  reports that she has never smoked. She has never been exposed to tobacco smoke. She has never used smokeless tobacco. She reports current alcohol use. She reports that she does not use drugs.      Objective:   BP (!) 152/55   Pulse (!) 58   Ht 5' 3" (1.6 m)   Wt 60.8 " kg (134 lb)   BMI 23.74 kg/m²    Physical Exam    Lipids:  Recent Labs   Lab 09/17/24  0935   LDL Cholesterol 76.0   HDL 51   Cholesterol 152      Renal:  Recent Labs   Lab 10/11/24  1206   Creatinine 1.4   Potassium 4.4   CO2 23   BUN 26     Liver:  Recent Labs   Lab 09/17/24  0935   AST 25   ALT 17     CBC:  Lab Results   Component Value Date    WBC 7.40 10/11/2024    HGB 11.3 (L) 10/11/2024    HCT 34.5 (L) 10/11/2024     (H) 10/11/2024     10/11/2024           Assessment and Plan:       ICD-10-CM ICD-9-CM   1. Primary hypertension  I10 401.9   2. Bilateral carotid artery stenosis  I65.23 433.10     433.30   3. Pure hypercholesterolemia  E78.00 272.0   4. Stage 3a chronic kidney disease  N18.31 585.3   5. Aortic atherosclerosis  I70.0 440.0   6. Sinus bradycardia  R00.1 427.89        Continue same meds for hypertension. May take additional hydralazine PRN. Continue same dose of labetalol but should not increase the dose due to mild bradycardia.  Stable carotid disease.  Creatinie 1.4; has been 1.0-1.6 mg/dl in the past 2 years.    Orders placed during this encounter:     Primary hypertension  -     CV Ultrasound renal artery stenosis hypertension limited; Future    Bilateral carotid artery stenosis    Pure hypercholesterolemia    Stage 3a chronic kidney disease    Aortic atherosclerosis    Sinus bradycardia         No follow-ups on file.

## 2024-10-29 NOTE — PATIENT INSTRUCTIONS
You can take 2 tab of hydralazine if SBP (systolic blood pressure) is extremely high but you should take them 20 minutes apart    GasX 2 tab in the afternoon and night before the ultrasound and again the next morning. Nothing to eat or drink for 6-8 hours prior to the ultrasound.

## 2024-11-04 DIAGNOSIS — I70.0 AORTIC ATHEROSCLEROSIS: Chronic | ICD-10-CM

## 2024-11-04 DIAGNOSIS — N18.30 CKD (CHRONIC KIDNEY DISEASE), STAGE III: Primary | Chronic | ICD-10-CM

## 2024-11-05 ENCOUNTER — HOSPITAL ENCOUNTER (OUTPATIENT)
Dept: RADIOLOGY | Facility: HOSPITAL | Age: 89
Discharge: HOME | End: 2024-11-05
Attending: INTERNAL MEDICINE
Payer: MEDICARE

## 2024-11-05 DIAGNOSIS — N18.30 CKD (CHRONIC KIDNEY DISEASE), STAGE III: Chronic | ICD-10-CM

## 2024-11-05 PROCEDURE — 93975 VASCULAR STUDY: CPT

## 2024-11-22 DIAGNOSIS — I65.23 BILATERAL CAROTID ARTERY STENOSIS: Chronic | ICD-10-CM

## 2024-11-22 RX ORDER — EZETIMIBE 10 MG/1
10 TABLET ORAL
Qty: 90 TABLET | Refills: 3 | Status: SHIPPED | OUTPATIENT
Start: 2024-11-22

## 2024-11-25 ENCOUNTER — OFFICE VISIT (OUTPATIENT)
Dept: INTERNAL MEDICINE | Facility: CLINIC | Age: 89
End: 2024-11-25
Payer: MEDICARE

## 2024-11-25 ENCOUNTER — LAB VISIT (OUTPATIENT)
Dept: LAB | Facility: HOSPITAL | Age: 89
End: 2024-11-25
Attending: INTERNAL MEDICINE
Payer: MEDICARE

## 2024-11-25 VITALS
WEIGHT: 129.88 LBS | BODY MASS INDEX: 23.9 KG/M2 | TEMPERATURE: 98 F | SYSTOLIC BLOOD PRESSURE: 132 MMHG | HEIGHT: 62 IN | HEART RATE: 72 BPM | OXYGEN SATURATION: 99 % | DIASTOLIC BLOOD PRESSURE: 64 MMHG

## 2024-11-25 DIAGNOSIS — I10 PRIMARY HYPERTENSION: Primary | Chronic | ICD-10-CM

## 2024-11-25 DIAGNOSIS — N18.32 STAGE 3B CHRONIC KIDNEY DISEASE: Chronic | ICD-10-CM

## 2024-11-25 DIAGNOSIS — I65.23 BILATERAL CAROTID ARTERY STENOSIS: Chronic | ICD-10-CM

## 2024-11-25 DIAGNOSIS — M19.90 ARTHRITIS: ICD-10-CM

## 2024-11-25 DIAGNOSIS — E78.00 PURE HYPERCHOLESTEROLEMIA: Chronic | ICD-10-CM

## 2024-11-25 DIAGNOSIS — M54.2 NECK PAIN: ICD-10-CM

## 2024-11-25 DIAGNOSIS — I70.0 AORTIC ATHEROSCLEROSIS: Chronic | ICD-10-CM

## 2024-11-25 DIAGNOSIS — I10 PRIMARY HYPERTENSION: Chronic | ICD-10-CM

## 2024-11-25 DIAGNOSIS — F41.9 ANXIETY: Chronic | ICD-10-CM

## 2024-11-25 LAB
ALBUMIN SERPL BCP-MCNC: 4.1 G/DL (ref 3.5–5.2)
ALP SERPL-CCNC: 70 U/L (ref 40–150)
ALT SERPL W/O P-5'-P-CCNC: 18 U/L (ref 10–44)
ANION GAP SERPL CALC-SCNC: 12 MMOL/L (ref 8–16)
AST SERPL-CCNC: 22 U/L (ref 10–40)
BASOPHILS # BLD AUTO: 0.03 K/UL (ref 0–0.2)
BASOPHILS NFR BLD: 0.4 % (ref 0–1.9)
BILIRUB SERPL-MCNC: 0.7 MG/DL (ref 0.1–1)
BUN SERPL-MCNC: 34 MG/DL (ref 10–30)
CALCIUM SERPL-MCNC: 10.3 MG/DL (ref 8.7–10.5)
CHLORIDE SERPL-SCNC: 110 MMOL/L (ref 95–110)
CHOLEST SERPL-MCNC: 143 MG/DL (ref 120–199)
CHOLEST/HDLC SERPL: 2.6 {RATIO} (ref 2–5)
CO2 SERPL-SCNC: 19 MMOL/L (ref 23–29)
CREAT SERPL-MCNC: 1.4 MG/DL (ref 0.5–1.4)
DIFFERENTIAL METHOD BLD: ABNORMAL
EOSINOPHIL # BLD AUTO: 0.3 K/UL (ref 0–0.5)
EOSINOPHIL NFR BLD: 3.7 % (ref 0–8)
ERYTHROCYTE [DISTWIDTH] IN BLOOD BY AUTOMATED COUNT: 12.2 % (ref 11.5–14.5)
EST. GFR  (NO RACE VARIABLE): 35.5 ML/MIN/1.73 M^2
GLUCOSE SERPL-MCNC: 92 MG/DL (ref 70–110)
HCT VFR BLD AUTO: 32 % (ref 37–48.5)
HDLC SERPL-MCNC: 54 MG/DL (ref 40–75)
HDLC SERPL: 37.8 % (ref 20–50)
HGB BLD-MCNC: 10.5 G/DL (ref 12–16)
IMM GRANULOCYTES # BLD AUTO: 0.03 K/UL (ref 0–0.04)
IMM GRANULOCYTES NFR BLD AUTO: 0.4 % (ref 0–0.5)
LDLC SERPL CALC-MCNC: 54.4 MG/DL (ref 63–159)
LYMPHOCYTES # BLD AUTO: 2.5 K/UL (ref 1–4.8)
LYMPHOCYTES NFR BLD: 30.1 % (ref 18–48)
MCH RBC QN AUTO: 33.3 PG (ref 27–31)
MCHC RBC AUTO-ENTMCNC: 32.8 G/DL (ref 32–36)
MCV RBC AUTO: 102 FL (ref 82–98)
MONOCYTES # BLD AUTO: 0.8 K/UL (ref 0.3–1)
MONOCYTES NFR BLD: 9.3 % (ref 4–15)
NEUTROPHILS # BLD AUTO: 4.6 K/UL (ref 1.8–7.7)
NEUTROPHILS NFR BLD: 56.1 % (ref 38–73)
NONHDLC SERPL-MCNC: 89 MG/DL
NRBC BLD-RTO: 0 /100 WBC
PLATELET # BLD AUTO: 206 K/UL (ref 150–450)
PMV BLD AUTO: 9.5 FL (ref 9.2–12.9)
POTASSIUM SERPL-SCNC: 4.9 MMOL/L (ref 3.5–5.1)
PROT SERPL-MCNC: 6.7 G/DL (ref 6–8.4)
RBC # BLD AUTO: 3.15 M/UL (ref 4–5.4)
SODIUM SERPL-SCNC: 141 MMOL/L (ref 136–145)
TRIGL SERPL-MCNC: 173 MG/DL (ref 30–150)
TSH SERPL DL<=0.005 MIU/L-ACNC: 1.9 UIU/ML (ref 0.4–4)
WBC # BLD AUTO: 8.27 K/UL (ref 3.9–12.7)

## 2024-11-25 PROCEDURE — 80053 COMPREHEN METABOLIC PANEL: CPT | Performed by: INTERNAL MEDICINE

## 2024-11-25 PROCEDURE — 84443 ASSAY THYROID STIM HORMONE: CPT | Performed by: INTERNAL MEDICINE

## 2024-11-25 PROCEDURE — G2211 COMPLEX E/M VISIT ADD ON: HCPCS | Mod: S$PBB,,, | Performed by: INTERNAL MEDICINE

## 2024-11-25 PROCEDURE — 99215 OFFICE O/P EST HI 40 MIN: CPT | Mod: PBBFAC,PO | Performed by: INTERNAL MEDICINE

## 2024-11-25 PROCEDURE — 85025 COMPLETE CBC W/AUTO DIFF WBC: CPT | Performed by: INTERNAL MEDICINE

## 2024-11-25 PROCEDURE — 36415 COLL VENOUS BLD VENIPUNCTURE: CPT | Mod: PO | Performed by: INTERNAL MEDICINE

## 2024-11-25 PROCEDURE — 99999 PR PBB SHADOW E&M-EST. PATIENT-LVL V: CPT | Mod: PBBFAC,,, | Performed by: INTERNAL MEDICINE

## 2024-11-25 PROCEDURE — 99214 OFFICE O/P EST MOD 30 MIN: CPT | Mod: S$PBB,,, | Performed by: INTERNAL MEDICINE

## 2024-11-25 PROCEDURE — 80061 LIPID PANEL: CPT | Performed by: INTERNAL MEDICINE

## 2024-11-25 NOTE — PROGRESS NOTES
Assessment:       1. Primary hypertension  - CBC Auto Differential; Future  - Comprehensive Metabolic Panel; Future  - TSH; Future  - Lipid Panel; Future    2. Pure hypercholesterolemia    3. Aortic atherosclerosis    4. Bilateral carotid artery stenosis    5. Stage 3b chronic kidney disease    6. Anxiety    7. Neck pain  - Ambulatory referral/consult to Physical/Occupational Therapy; Future    8. Arthritis  - Ambulatory referral/consult to Orthopedics; Future        Plan:       1. Continue aliskiren 300 mg, amlodipine 5 mg, hydralazine 25 mg twice daily, labetalol 1-1/2 tablets twice daily.    2/3/4.  Continue Zetia 10 mg, pravastatin 80 mg   5. Monitor CMPs   6. Monitor.  7. Refer to physical therapy.    8.  Refer to orthopedics.    Assessment & Plan    IMPRESSION:  1. Assessed medication regimen and recommended adjusting timing of administration to potentially alleviate fatigue and discomfort  2. Considered potential side effects of current medication regimen, including possibility of excessive blood pressure medication causing weakness and dizziness  3. Evaluated use of OTC supplements, recommending discontinuation of turmeric due to potential negative effects on kidney function  4. Discussed management of neck pain, suggesting updated physical therapy may be beneficial  5. Reviewed Dr. Aldridge's management of hip and lower back issues, including ordered MRI and x-ray    HYPERTENSION:   Started Aliskiren 150 mg twice daily.   Started Amlodipine 2.5 mg twice daily.   Started Hydralazine 25 mg as needed if blood pressure is over 160.   Started Labetalol 150 mg twice daily.    HYPERLIPIDEMIA:   Explained that cholesterol is primarily produced at night, justifying evening administration of pravastatin.   Changed Pravastatin 80 mg: take at bedtime instead of in the morning.   Continued Zetia 10 mg.    CERVICAL SPONDYLOSIS:   Ms. Lai to continue performing physical therapy exercises for neck and  head.    OSTEOARTHRITIS:   Informed patient that arthritis can affect toe joints.   Changed Glucosamine: take at night with dinner.   Referred to podiatrist for evaluation of toe arthritis.   Referred to Dr. Royal (orthopedics) for evaluation of toe pain.    GENERAL MEDICAL ADVICE:   Clarified that OTC supplements, including turmeric, can have potential negative effects despite being perceived as natural and safe.   Changed Vitamin D: take at night with dinner.   Continued Aspirin, Prilosec, and Tylenol as needed.   Discontinued Turmeric.    FOLLOW-UP:   Follow up in 6 months.                 This note was generated with the assistance of ambient listening technology. Verbal consent was obtained by the patient and accompanying visitor(s) for the recording of patient appointment to facilitate this note. I attest to having reviewed and edited the generated note for accuracy, though some syntax or spelling errors may persist. Please contact the author of this note for any clarification.       Subjective:       Patient ID: Gerda Lai is a 89 y.o. female.    Chief Complaint: Follow-up    HPI    89-year-old female here for follow-up.    Patient has hypertension on aliskiren 150 mg (BID), amlodipine 2.5 mg (BID), hydralazine 25 mg twice daily (as needed over ), labetalol 100 mg 1-1/2 tablets twice daily.    Patient has hyperlipidemia, aortic atherosclerosis, bilateral carotid artery stenosis on Zetia 10 mg, pravastatin 80 mg.    Patient has stage IIIB chronic kidney disease monitored with CMPs.      Patient has anxiety on no current medication.    History of Present Illness    CHIEF COMPLAINT:  Ms. Lai presents today for follow-up of multiple chronic conditions.    HYPERTENSION:  She is on Aliskiren 300 mg and Amlodipine 5 mg (both taken as half dose in morning and half at night), Hydralazine 25 mg twice daily (taken as needed if blood pressure exceeds 160), and Labetalol 100 mg 1.5 tablets twice daily.  "She reports not checking her blood pressure as often as recommended due to Wi-Fi connectivity issues. She denies dizziness or lightheadedness but describes a pressing down sensation that makes walking uncomfortable.    HYPERLIPIDEMIA:  She is currently taking Zetia 10 mg and Pravastatin 80 mg in the morning for hyperlipidemia, reporting no issues with these medications.    CARDIOVASCULAR:  She has a history of aortic atherosclerosis and bilateral carotid artery stenosis, with no specific symptoms or concerns reported during this follow-up visit.    CHRONIC KIDNEY DISEASE:  She has stage 3B chronic kidney disease, which is being monitored with comprehensive metabolic panels (CMPs).    ANXIETY:  She reports having anxiety but is not currently taking any medication for this condition.    MUSCULOSKELETAL:  She reports severe arthritis in her neck and toes, describing the neck arthritis as "terrible" and noting it has spread to multiple areas. She is currently under the care of Dr. Escamilla, a pain management specialist. She has completed physical therapy for her hip as ordered by Dr. Escamilla but continues to perform exercises for her neck and head learned over a year ago. She expresses interest in potentially revisiting physical therapy for her neck, acknowledging that her current regimen may no longer be addressing her needs effectively. She experienced two falls in March, approximately one week apart. Several weeks following the falls, she began experiencing hip problems, exacerbated by prolonged sitting. An MRI and x-ray have been ordered for both the hip and lower back for further evaluation.    MEDICATIONS:  In addition to her hypertension and hyperlipidemia medications, she reports taking Prilosec, Glucosamine, Turmeric, Vitamin D, and Tylenol.      ROS:  Constitutional: -dizziness  Musculoskeletal: +joint pain  Psychiatric: +anxiety         Review of Systems          Objective:      Physical Exam  Vitals reviewed. "   Constitutional:       Appearance: She is well-developed.   HENT:      Head: Normocephalic and atraumatic.      Mouth/Throat:      Pharynx: No oropharyngeal exudate.   Eyes:      General: No scleral icterus.        Right eye: No discharge.         Left eye: No discharge.      Pupils: Pupils are equal, round, and reactive to light.   Neck:      Thyroid: No thyromegaly.      Trachea: No tracheal deviation.   Cardiovascular:      Rate and Rhythm: Normal rate and regular rhythm.      Heart sounds: Normal heart sounds. No murmur heard.     No friction rub. No gallop.   Pulmonary:      Effort: Pulmonary effort is normal. No respiratory distress.      Breath sounds: Normal breath sounds. No wheezing or rales.   Chest:      Chest wall: No tenderness.   Abdominal:      General: Bowel sounds are normal. There is no distension.      Palpations: Abdomen is soft. There is no mass.      Tenderness: There is no abdominal tenderness. There is no guarding or rebound.   Musculoskeletal:         General: No tenderness. Normal range of motion.      Cervical back: Normal range of motion and neck supple.   Skin:     General: Skin is warm and dry.      Coloration: Skin is not pale.      Findings: No erythema or rash.   Neurological:      Mental Status: She is alert and oriented to person, place, and time.   Psychiatric:         Behavior: Behavior normal.

## 2024-11-26 ENCOUNTER — HOSPITAL ENCOUNTER (OUTPATIENT)
Dept: RADIOLOGY | Facility: HOSPITAL | Age: 89
Discharge: HOME | End: 2024-11-26
Attending: STUDENT IN AN ORGANIZED HEALTH CARE EDUCATION/TRAINING PROGRAM
Payer: MEDICARE

## 2024-11-26 ENCOUNTER — HOSPITAL ENCOUNTER (OUTPATIENT)
Dept: CARDIOLOGY | Facility: HOSPITAL | Age: 89
Discharge: HOME | End: 2024-11-26
Attending: INTERNAL MEDICINE
Payer: MEDICARE

## 2024-11-26 DIAGNOSIS — M54.16 LUMBAR RADICULOPATHY, CHRONIC: ICD-10-CM

## 2024-11-26 DIAGNOSIS — I70.0 AORTIC ATHEROSCLEROSIS: Chronic | ICD-10-CM

## 2024-11-26 DIAGNOSIS — N18.30 CKD (CHRONIC KIDNEY DISEASE), STAGE III: Chronic | ICD-10-CM

## 2024-11-26 LAB
ABDOMINAL AORTA PROX EDV: 17 CM/S
ABDOMINAL AORTA PROX PSV: 138 CM/S
LEFT RENAL DIST DIAS: 20 CM/S
LEFT RENAL DIST SYS: 108 CM/S
LEFT RENAL ORIGIN DIAS: 22 CM/S
LEFT RENAL ORIGIN SYS: 161 CM/S
LEFT RENAL PROX DIAS: 14 CM/S
LEFT RENAL PROX RAR: 1.43
LEFT RENAL PROX SYS: 198 CM/S
LEFT RENAL ULTRASOUND ACCELERATION TIME MEASUREMENT 1: 0.18 MS
LEFT RENAL ULTRASOUND ACCELERATION TIME MEASUREMENT 2: 0.18 MS
LEFT RENAL ULTRASOUND ACCELERATION TIME MEASUREMENT 3: 0.24 MS
LEFT RENAL ULTRASOUND ACCELERATION TIME MEASUREMENT AVERAGE: 0.24 MS
LEFT RENAL ULTRASOUND KIDNEY SIZE MEASUREMENT 1: 10.08 CM
LEFT RENAL ULTRASOUND KIDNEY SIZE MEASUREMENT 2: 10.91 CM
LEFT RENAL ULTRASOUND KIDNEY SIZE MEASUREMENT 3: 10.87 CM
LEFT RENAL ULTRASOUND KIDNEY SIZE MEASUREMENT AVERAGE: 10.91 CM
LEFT RENAL ULTRASOUND RESISTIVE INDEX MEASUREMENT 1: 0.66
LEFT RENAL ULTRASOUND RESISTIVE INDEX MEASUREMENT 2: 0.79
LEFT RENAL ULTRASOUND RESISTIVE INDEX MEASUREMENT 3: 0.82
LEFT RENAL ULTRASOUND RESISTIVE INDEX MEASUREMENT AVERAGE: 0.82
OHS CV LEFT RENAL RAR: 1.43
OHS CV RIGHT RENAL RAR: 2.34
OHS CV US LEFT RENAL HIGHEST EDV: 22
OHS CV US LEFT RENAL HIGHEST PSV: 198
OHS CV US RIGHT RENAL HIGHEST EDV: 48
OHS CV US RIGHT RENAL HIGHEST PSV: 323
RIGHT RENAL DIST DIAS: 13 CM/S
RIGHT RENAL DIST SYS: 173 CM/S
RIGHT RENAL MID DIAS: 20 CM/S
RIGHT RENAL MID SYS: 190 CM/S
RIGHT RENAL ORIGIN DIAS: 48 CM/S
RIGHT RENAL ORIGIN SYS: 323 CM/S
RIGHT RENAL PROX DIAS: 30 CM/S
RIGHT RENAL PROX RAR: 1.03
RIGHT RENAL PROX SYS: 142 CM/S
RIGHT RENAL ULTRASOUND ACCELERATION TIME MEASUREMENT 1: 0.24 MS
RIGHT RENAL ULTRASOUND ACCELERATION TIME MEASUREMENT 2: 0.2 MS
RIGHT RENAL ULTRASOUND ACCELERATION TIME MEASUREMENT 3: 0.31 MS
RIGHT RENAL ULTRASOUND ACCELERATION TIME MEASUREMENT AVERAGE: 0.31 MS
RIGHT RENAL ULTRASOUND KIDNEY SIZE MEASUREMENT 1: 10.84 CM
RIGHT RENAL ULTRASOUND KIDNEY SIZE MEASUREMENT 2: 10.51 CM
RIGHT RENAL ULTRASOUND KIDNEY SIZE MEASUREMENT 3: 10.64 CM
RIGHT RENAL ULTRASOUND KIDNEY SIZE MEASUREMENT AVERAGE: 10.84 CM
RIGHT RENAL ULTRASOUND RESISTIVE INDEX MEASUREMENT 1: 0.87
RIGHT RENAL ULTRASOUND RESISTIVE INDEX MEASUREMENT 2: 0.85
RIGHT RENAL ULTRASOUND RESISTIVE INDEX MEASUREMENT 3: 0.71
RIGHT RENAL ULTRASOUND RESISTIVE INDEX MEASUREMENT AVERAGE: 0.87

## 2024-11-26 PROCEDURE — 72148 MRI LUMBAR SPINE W/O DYE: CPT | Mod: TC

## 2024-11-26 PROCEDURE — 72114 X-RAY EXAM L-S SPINE BENDING: CPT | Mod: 26,,, | Performed by: RADIOLOGY

## 2024-11-26 PROCEDURE — 72114 X-RAY EXAM L-S SPINE BENDING: CPT | Mod: TC

## 2024-11-26 PROCEDURE — 93975 VASCULAR STUDY: CPT | Mod: PO

## 2024-11-26 PROCEDURE — 72148 MRI LUMBAR SPINE W/O DYE: CPT | Mod: 26,,, | Performed by: RADIOLOGY

## 2024-11-26 PROCEDURE — 93975 VASCULAR STUDY: CPT | Mod: 26,,, | Performed by: INTERNAL MEDICINE

## 2024-12-02 ENCOUNTER — TELEPHONE (OUTPATIENT)
Dept: PAIN MEDICINE | Facility: CLINIC | Age: 89
End: 2024-12-02

## 2024-12-02 ENCOUNTER — OFFICE VISIT (OUTPATIENT)
Dept: PAIN MEDICINE | Facility: CLINIC | Age: 89
End: 2024-12-02
Payer: MEDICARE

## 2024-12-02 VITALS
HEIGHT: 62 IN | SYSTOLIC BLOOD PRESSURE: 129 MMHG | HEART RATE: 70 BPM | DIASTOLIC BLOOD PRESSURE: 66 MMHG | WEIGHT: 129.88 LBS | BODY MASS INDEX: 23.9 KG/M2

## 2024-12-02 DIAGNOSIS — M70.61 TENDINITIS IN REGION OF GREATER TROCHANTER OF FEMUR, RIGHT: ICD-10-CM

## 2024-12-02 DIAGNOSIS — M79.10 MYALGIA: ICD-10-CM

## 2024-12-02 DIAGNOSIS — M70.61 TROCHANTERIC BURSITIS OF RIGHT HIP: Primary | ICD-10-CM

## 2024-12-02 DIAGNOSIS — M67.951 TENDINOPATHY OF RIGHT GLUTEAL REGION: Primary | ICD-10-CM

## 2024-12-02 DIAGNOSIS — M70.61 TROCHANTERIC BURSITIS OF RIGHT HIP: ICD-10-CM

## 2024-12-02 DIAGNOSIS — G57.01 PIRIFORMIS SYNDROME OF RIGHT SIDE: ICD-10-CM

## 2024-12-02 DIAGNOSIS — M54.16 LUMBAR RADICULOPATHY, CHRONIC: ICD-10-CM

## 2024-12-02 DIAGNOSIS — M25.551 RIGHT HIP PAIN: ICD-10-CM

## 2024-12-02 PROCEDURE — 99999 PR PBB SHADOW E&M-EST. PATIENT-LVL IV: CPT | Mod: PBBFAC,,, | Performed by: STUDENT IN AN ORGANIZED HEALTH CARE EDUCATION/TRAINING PROGRAM

## 2024-12-02 PROCEDURE — 99214 OFFICE O/P EST MOD 30 MIN: CPT | Mod: S$PBB,,, | Performed by: STUDENT IN AN ORGANIZED HEALTH CARE EDUCATION/TRAINING PROGRAM

## 2024-12-02 PROCEDURE — 99214 OFFICE O/P EST MOD 30 MIN: CPT | Mod: PBBFAC | Performed by: STUDENT IN AN ORGANIZED HEALTH CARE EDUCATION/TRAINING PROGRAM
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
## 2024-12-03 ENCOUNTER — HOSPITAL ENCOUNTER (OUTPATIENT)
Dept: RADIOLOGY | Facility: HOSPITAL | Age: 89
Discharge: HOME | End: 2024-12-03
Attending: ORTHOPAEDIC SURGERY
Payer: MEDICARE

## 2024-12-03 ENCOUNTER — OFFICE VISIT (OUTPATIENT)
Dept: ORTHOPEDICS | Facility: CLINIC | Age: 89
End: 2024-12-03
Payer: MEDICARE

## 2024-12-03 VITALS — WEIGHT: 129.88 LBS | BODY MASS INDEX: 23.9 KG/M2 | HEIGHT: 62 IN

## 2024-12-03 DIAGNOSIS — M20.41 HAMMER TOE OF RIGHT FOOT: Primary | ICD-10-CM

## 2024-12-03 DIAGNOSIS — M19.90 ARTHRITIS: ICD-10-CM

## 2024-12-03 DIAGNOSIS — M19.071 ARTHRITIS OF RIGHT MIDFOOT: ICD-10-CM

## 2024-12-03 DIAGNOSIS — L60.3 DYSTROPHIC NAIL: ICD-10-CM

## 2024-12-03 DIAGNOSIS — M20.11 HALLUX VALGUS OF RIGHT FOOT: ICD-10-CM

## 2024-12-03 PROCEDURE — 99999 PR PBB SHADOW E&M-EST. PATIENT-LVL IV: CPT | Mod: PBBFAC,,, | Performed by: ORTHOPAEDIC SURGERY

## 2024-12-03 PROCEDURE — 99214 OFFICE O/P EST MOD 30 MIN: CPT | Mod: PBBFAC,25 | Performed by: ORTHOPAEDIC SURGERY

## 2024-12-03 PROCEDURE — 73630 X-RAY EXAM OF FOOT: CPT | Mod: 26,RT,, | Performed by: RADIOLOGY

## 2024-12-03 PROCEDURE — 73630 X-RAY EXAM OF FOOT: CPT | Mod: TC,RT

## 2024-12-03 PROCEDURE — 99213 OFFICE O/P EST LOW 20 MIN: CPT | Mod: S$PBB,,, | Performed by: ORTHOPAEDIC SURGERY

## 2024-12-03 NOTE — PROGRESS NOTES
Gerda Lai  This is a 89-year-old female who I initially saw on 03/28/2022 for multiple complaints including right hallux valgus, metatarsalgia of the right foot and hammertoes of the right foot, as well as big toe and midfoot arthritis.  She did not want to entertain any surgery at that time.  She returns today reporting that she would like to proceed with surgery to correct her big toe in her 2nd toe deformities due to inability to get into shoes and associated pain.  She also has complaints about her right great toenail which is markedly thickened and dystrophic.  She was seen in Podiatry about a year ago but did not have anything done.  She comes in today to discuss surgery on her right foot.    Examination:  She comes up today in a wheelchair but does ambulate independently.  She was able to walk to the x-ray room and take a standing x-ray.  On standing inspection she has significant flattening of her arches with a severe hallux valgus deformity on the right side with hammering of the 2nd toe.  On sitting exam she has functional motion of her right ankle and subtalar joint.  She has decent function of all the tendons about her ankle.  She has palpable distal pulses.  She has painless motion of her big toe.  She has a fixed flexion deformity of her 2nd toe.  Has mild diffuse tenderness about her midfoot.  She has normal sensation.    Imaging:  I ordered and reviewed a standing x-ray of the right foot today compared it to the x-ray from 2022.  There has been no interval change in the alignment of the foot.  She has a severe hallux valgus deformity with an increased 1st intermetatarsal angle and an incongruent 1st MTP joint.  She has hammering of the 2nd toe.  She has diffuse arthritic changes of the midfoot.  On the standing lateral view the longitudinal arches sagging through the tarsometatarsal joints    Impression:  1. Hammer toe of right foot, 2nd toe        2. Hallux valgus of right foot  Ambulatory  referral/consult to Orthopedics    X-Ray Foot Complete Right      3. Arthritis of right midfoot        4. Dystrophic nail, right big toenail  Ambulatory referral/consult to Podiatry        Recommendation:  Despite her age, this is an active alert 89-year-old female who would like to proceed with surgical correction of her hallux valgus deformity and 2nd hammertoe.  I explained to her that this is a significant operation which will require a prolonged recovery and there is no guarantee of complete correction of deformity or the ability to wear any shoe.  I would like her to have her big toenail address prior to having any reconstructive surgery, so I am going to refer her to Podiatry to treat her dystrophic big toenail.  She will call let us know when she would like to schedule surgery

## 2024-12-05 ENCOUNTER — CLINICAL SUPPORT (OUTPATIENT)
Dept: REHABILITATION | Facility: HOSPITAL | Age: 89
End: 2024-12-05
Attending: INTERNAL MEDICINE
Payer: MEDICARE

## 2024-12-05 DIAGNOSIS — M99.01 SEGMENTAL AND SOMATIC DYSFUNCTION OF CERVICAL REGION: ICD-10-CM

## 2024-12-05 DIAGNOSIS — M43.6 STIFFNESS OF NECK: ICD-10-CM

## 2024-12-05 DIAGNOSIS — M54.2 NECK PAIN: ICD-10-CM

## 2024-12-05 DIAGNOSIS — R29.898 IMPAIRED STRENGTH OF NECK MUSCLES: Primary | ICD-10-CM

## 2024-12-05 PROCEDURE — 97161 PT EVAL LOW COMPLEX 20 MIN: CPT | Mod: PO | Performed by: PHYSICAL THERAPIST

## 2024-12-05 NOTE — PROGRESS NOTES
[facility]   Physical Therapy Initial Evaluation     Date: 12/5/2024   Name: Gerda Lai  Clinic Number: [id number]    Therapy Diagnosis:   Encounter Diagnoses   Name Primary?    Neck pain     Impaired strength of neck muscles Yes    Stiffness of neck     Segmental and somatic dysfunction of cervical region      Physician: Tomas Beltran MD    Physician Orders: PT Eval and Treat neck   Medical Diagnosis from Referral:   Neck pain [M54.2]   Evaluation Date: 12/5/2024  Authorization Period Expiration: 11/25/2025  Plan of Care Expiration: 3/5/2025  Progress Note Due: 1/5/2025  Visit # / Visits authorized: 1/ 1       Foto  Date / Visit# Score    #1/3 12/5/2024-1 50%   #2/3       #3/3                 Precautions: Standard    Time In: 1345  Time Out: 1440  Total Appointment Time (timed & untimed codes): 55 minutes      SUBJECTIVE       History of current condition: Gerda reports that she has some neck pain that is intermittent. She says that it started on the left but has moved to the right. She has pain in multiple areas as well.       Date of onset: long history of neck pain. It has subtly become worse over thel last six months in that it moved to the right side.   Falls: March 2024 x2   Pain:  Current 0/10, worst 3-4 /10, best 0/10   Location: bilateral neck    Description: Dull, tingling   Aggravating Factors: no one thing that she can identify. Restricted to the left.   Easing Factors: massage w/Aspercre[employer].   Sleep: not disturbed      Current Level of Function:   Personal - not limited   Domestic - the neck does not contribute to home ADL limitation .   Community- not limiting   Occupation - NA  Sports/recreation/fitness - does not participate. Performs HEP provided for her Low  Back  Prior Level of Function: unchanged   Prior Therapy: yes   Social History: single  lives alone in a single family home. N o indoor stairs   Occupation: retired     Patients goals: more ROM   Imaging, X-Rays  3/19/2024:    There is no evidence of an acute fracture or subluxation.  There is mild anterolisthesis of C6 on C7.  There is straightening or reversal of the usual cervical lordosis.  Alignment is unchanged from prior.  There are continued severe degenerative changes of the cervical spine, with unchanged appearance from prior.  There is severe disc height loss at C3-C4, C4-C5, and moderate disc height loss at C5-C6.  There are bulky marginal osteophytes.  There is degenerative vertebral body height loss at C5, unchanged.  There is multilevel bilateral uncovertebral joint spurring and facet arthropathy.  Prevertebral soft tissues are within normal limits.  Remaining visualized osseous structures are intact.  Bilateral auditory devices noted.            Medical History:   Past Medical History:   Diagnosis Date    Anxiety     Arthritis     Basal cell carcinoma 09/2016    right post auricular neck     Basal cell carcinoma of right forearm 04/19/2023    R lower forearm    Breast cancer 1992    right    Cataract     Fibromyalgia     History of measles as a child     Hyperlipidemia     Hypertension     Personal history of colonic polyps     Pneumonia     SCC (squamous cell carcinoma) 2015    R chest    SCC (squamous cell carcinoma) 2016    left medial shoulder    SCC (squamous cell carcinoma) 2017    left knee    SCC (squamous cell carcinoma) 2022    L upper chest, R upper arm KA types    Shingles     Squamous cell carcinoma 2015    right forearm    Thyroid disease     Vaginitis        Surgical History:   Gerda Lai  has a past surgical history that includes thyriodectomy; Total hip arthroplasty; tonsillectomy; Adenoidectomy; Cataract extraction w/  intraocular lens implant (Bilateral); Yag  (Left); Breast lumpectomy (Right, 1992); Breast biopsy (Left); Eye surgery; Tonsillectomy; Joint replacement (Right); Knee Arthroplasty (Left, 8/10/2020); Carpal tunnel release (Right, 3/16/2021); Hand surgery; Injection of  anesthetic agent around medial branch nerves innervating lumbar facet joint (Left, 11/18/2021); Injection of anesthetic agent around medial branch nerves innervating cervical facet joint (N/A, 1/4/2022); Radiofrequency thermal coagulation of medial branch of posterior ramus of cervical spinal nerve (Left, 3/8/2022); Epidural steroid injection (N/A, 2/24/2023); arthroplasty, knee, total, using computer-assisted navigation (Right, 5/22/2023); and Epidural steroid injection into cervical spine (N/A, 6/10/2024).    Medications:   Gerda has a current medication list which includes the following prescription(s): acetaminophen, aliskiren, amlodipine, aspirin, b complex vitamins, coenzyme q10, ezetimibe, glucosamine-chondroitin, hydralazine, labetalol, lorazepam, multivitamin, omeprazole, pravastatin, psyllium husk (aspartame), turmeric, and vitamin d.    Allergies:   Review of patient's allergies indicates:   Allergen Reactions    Sulfa (sulfonamide antibiotics) Rash    Clarithromycin Other (See Comments)     Weak, extreme fatigue. dizziness    Flexeril [cyclobenzaprine] Other (See Comments)     Dizziness    Iodine and iodide containing products     Lisinopril Other (See Comments)     Cough and sensation of throat swelling/?angioedema    Losartan Rash    Metoprolol Swelling     Tightness in throat    Spironolactone      Throat tightening    Tramadol Other (See Comments)     Dizzy and weak    Verapamil (bulk) Palpitations    Voltaren [diclofenac sodium] Other (See Comments)     Drops blood pressure          OBJECTIVE       Posture Alignment: forward head with rounded shoulders and protracted scapula, slight increase in upper thoracic kyphosis.   Palpation: unremarkable for elicited pain in the upper cervical / sub-occipital, mid cervical, lateral cervical, cervical and upper thoracic spinous processes.   Sensation: Light Touch: Intact    Range of Motion/Strength:     Cervical/Thoracic AROM: Pain/Dysfunction with Movement:    Flexion                     40 DP   Extension                  30 DP   Right side bending             20 NP   Left side bending                10 NP   Right rotation              <25%  DN   Left rotation                <25% DN       UPPER EXTREMITY STRENGTH:   Left Right   Shoulder Flexion 4/5 3-/5   Shoulder Abduction 4/5 2-/5     Shoulder Internal Rotation 4/5 4-/5   Shoulder External Rotation 4/5 4-/5   Elbow Flexion 4+/5 4+/5   Elbow Extension 4+/5 4+/5   Wrist Flexion 4/5 4/5   Wrist Extension 4/5 4/5       DTR's:   Left Right   Biceps Tendon 2+ 2+   Brachioradialis 2+ 2+   Triceps Tendon 2+ 2+             Selective Functional Movement Assessment:  FN: functional, non-painful  FP: functional, painful  DP: dysfunctional, painful  DN: dysfunctional, non-painful    Active Cervical Flexion: see above   Active Cervical Extension: see above   Cervical Rotation:  Right: see above   Left: see above     Cervical Break out patterns   Supine   Flex   -Active - DN  -Passive - DN  OA   -active  R - DN  L - DN    Rotation   -Active -   R - DP right lateral neck   L - DP right lateral neck   -Passive -   R - DP right side   L - DN     C1C2  -Active-     R - DP right side   L - DP right side     Extension   Active- DN       Limitation/Restriction for FOTO neck Survey    Therapist reviewed FOTO scores for Gerda Lai on 12/5/2024.   FOTO documents entered into [employer] - see Media section.    Limitation Score: 50%         TREATMENT     Total Treatment time (time-based codes) separate from Evaluation: 0 minutes      Gerda received the treatments listed below:      therapeutic exercises to develop strength, endurance, ROM, and flexibility for 0 minutes including:  -    manual therapy techniques:  were applied to the: - for - minutes, including:  -    neuromuscular re-education activities to improve: Balance, Proprioception, and stability for - minutes. The following activities were included:  -    therapeutic activities to  improve functional performance for -  minutes, including:  -    gait training to improve functional mobility and safety for -  minutes, including:  -    PATIENT EDUCATION AND HOME EXERCISES     Education provided:   --Findings of evaluation and examination, and affect of these on plan for treatment  -Prognosis and expectations  -Home exercise program and expectations of therapy     Written Home Exercises Provided: no.     ASSESSMENT     Gerda is a 89 y.o. female referred to outpatient Physical Therapy with a medical diagnosis of Neck pain [M54.2] . Patient presents with clinical findings of an OA upper cervical flexion mobility dysfunction and a possible cervical spine flexion mobility dysfunction and C1C2 cervical rotation pain dysfunction. Lack of mobility and strength in the RUE due to RTC deficit and  mobility dysfunction.     Patient prognosis is Fair.   Patient will benefit from skilled outpatient Physical Therapy to address the deficits stated above and in the chart below, provide patient /family education, and to maximize patientt's level of independence.     Plan of care discussed with patient: Yes  Patient's spiritual, cultural and educational needs considered and patient is agreeable to the plan of care and goals as stated below:     Anticipated Barriers for therapy: none    Medical Necessity is demonstrated by the following  History  Co-morbidities and personal factors that may impact the plan of care [x] LOW: no personal factors / co-morbidities  [] MODERATE: 1-2 personal factors / co-morbidities  [] HIGH: 3+ personal factors / co-morbidities    Moderate / High Support Documentation:   Co-morbidities affecting plan of care: none    Personal Factors:   no deficits     Examination  Body Structures and Functions, activity limitations and participation restrictions that may impact the plan of care [x] LOW: addressing 1-2 elements  [] MODERATE: 3+ elements  [] HIGH: 4+ elements (please support  below)    Moderate / High Support Documentation: -     Clinical Presentation [x] LOW: stable  [] MODERATE: Evolving  [] HIGH: Unstable     Decision Making/ Complexity Score: low       Goals:    Goals to be met:      Short Term GOALS: 5 weeks. Pt agrees with goals set.  1. Pts pain intensity decreased 20-40% for improved quality of life and functional mobility ONGOING  2. Pt to report reduced tightness associated with neck movements for improved quality of movement ONGOING   3. Pt to demonstrate increase neck mobility in all motions with improved flexibility in the cervical and suprascapular musculature for improved neck movements. ONGOING  4. Pt to exhibit increased neck mobility in C!C2 rotation w/o end range pain for improved movement quality. ONGOING  5. Patient demonstrates correct movement patterns associated with exercises for self management and independence with HEP ONGOING   6 Patient demonstrates independence with Postural Awareness and correction for self management ONGOING     Long Term GOALS: 10 weeks. Pt agrees with goals set.  1. Patient demonstrates increased scapulothoracic strength and endurance to improve tolerance to functional activities. ONGOING   2. Patient demonstrates increased cervical muscle strength for improved stability and tolerance to functional activities. ONGOING   3. Pt demonstrates neck movement patterns without end range pain for improved quality of daily activities.ONGOING  4 Pts pain level decreased to 75% or greater  for restoring functional mobility and ADL ONGOING  5. Pt demonstrates increased upper cervical OA and C1C2  / lower cervical rotation  movement patterns for improved mobility.ONGOING              Plan     Plan of care Certification: 12/5/2024 to 3/5/2024.    Outpatient Physical Therapy 2 times weekly for 10 weeks to include the following interventions: Manual Therapy, Neuromuscular Re-ed, Patient Education, Therapeutic Activities, and Therapeutic Exercise.     Don  Gene, PT, DPT

## 2024-12-06 NOTE — PLAN OF CARE
[facility]   Physical Therapy Initial Evaluation     Date: 12/5/2024   Name: Gerda Lai  Clinic Number: [id number]    Therapy Diagnosis:   Encounter Diagnoses   Name Primary?    Neck pain     Impaired strength of neck muscles Yes    Stiffness of neck     Segmental and somatic dysfunction of cervical region      Physician: Tomas Beltran MD    Physician Orders: PT Eval and Treat neck   Medical Diagnosis from Referral:   Neck pain [M54.2]   Evaluation Date: 12/5/2024  Authorization Period Expiration: 11/25/2025  Plan of Care Expiration: 3/5/2025  Progress Note Due: 1/5/2025  Visit # / Visits authorized: 1/ 1       Foto  Date / Visit# Score    #1/3 12/5/2024-1 50%   #2/3       #3/3                 Precautions: Standard    Time In: 1345  Time Out: 1440  Total Appointment Time (timed & untimed codes): 55 minutes      SUBJECTIVE       History of current condition: Gerda reports that she has some neck pain that is intermittent. She says that it started on the left but has moved to the right. She has pain in multiple areas as well.       Date of onset: long history of neck pain. It has subtly become worse over thel last six months in that it moved to the right side.   Falls: March 2024 x2   Pain:  Current 0/10, worst 3-4 /10, best 0/10   Location: bilateral neck    Description: Dull, tingling   Aggravating Factors: no one thing that she can identify. Restricted to the left.   Easing Factors: massage w/[employer].   Sleep: not disturbed      Current Level of Function:   Personal - not limited   Domestic - the neck does not contribute to home ADL limitation .   Community- not limiting   Occupation - NA  Sports/recreation/fitness - does not participate. Performs HEP provided for her Low  Back  Prior Level of Function: unchanged   Prior Therapy: yes   Social History: single  lives alone in a single family home. N o indoor stairs   Occupation: retired     Patients goals: more ROM   Imaging, X-Rays  3/19/2024:    There is no evidence of an acute fracture or subluxation.  There is mild anterolisthesis of C6 on C7.  There is straightening or reversal of the usual cervical lordosis.  Alignment is unchanged from prior.  There are continued severe degenerative changes of the cervical spine, with unchanged appearance from prior.  There is severe disc height loss at C3-C4, C4-C5, and moderate disc height loss at C5-C6.  There are bulky marginal osteophytes.  There is degenerative vertebral body height loss at C5, unchanged.  There is multilevel bilateral uncovertebral joint spurring and facet arthropathy.  Prevertebral soft tissues are within normal limits.  Remaining visualized osseous structures are intact.  Bilateral auditory devices noted.            Medical History:   Past Medical History:   Diagnosis Date    Anxiety     Arthritis     Basal cell carcinoma 09/2016    right post auricular neck     Basal cell carcinoma of right forearm 04/19/2023    R lower forearm    Breast cancer 1992    right    Cataract     Fibromyalgia     History of measles as a child     Hyperlipidemia     Hypertension     Personal history of colonic polyps     Pneumonia     SCC (squamous cell carcinoma) 2015    R chest    SCC (squamous cell carcinoma) 2016    left medial shoulder    SCC (squamous cell carcinoma) 2017    left knee    SCC (squamous cell carcinoma) 2022    L upper chest, R upper arm KA types    Shingles     Squamous cell carcinoma 2015    right forearm    Thyroid disease     Vaginitis        Surgical History:   Gerda Lai  has a past surgical history that includes thyriodectomy; Total hip arthroplasty; tonsillectomy; Adenoidectomy; Cataract extraction w/  intraocular lens implant (Bilateral); Yag  (Left); Breast lumpectomy (Right, 1992); Breast biopsy (Left); Eye surgery; Tonsillectomy; Joint replacement (Right); Knee Arthroplasty (Left, 8/10/2020); Carpal tunnel release (Right, 3/16/2021); Hand surgery; Injection of  anesthetic agent around medial branch nerves innervating lumbar facet joint (Left, 11/18/2021); Injection of anesthetic agent around medial branch nerves innervating cervical facet joint (N/A, 1/4/2022); Radiofrequency thermal coagulation of medial branch of posterior ramus of cervical spinal nerve (Left, 3/8/2022); Epidural steroid injection (N/A, 2/24/2023); arthroplasty, knee, total, using computer-assisted navigation (Right, 5/22/2023); and Epidural steroid injection into cervical spine (N/A, 6/10/2024).    Medications:   Gerda has a current medication list which includes the following prescription(s): acetaminophen, aliskiren, amlodipine, aspirin, b complex vitamins, coenzyme q10, ezetimibe, glucosamine-chondroitin, hydralazine, labetalol, lorazepam, multivitamin, omeprazole, pravastatin, psyllium husk (aspartame), turmeric, and vitamin d.    Allergies:   Review of patient's allergies indicates:   Allergen Reactions    Sulfa (sulfonamide antibiotics) Rash    Clarithromycin Other (See Comments)     Weak, extreme fatigue. dizziness    Flexeril [cyclobenzaprine] Other (See Comments)     Dizziness    Iodine and iodide containing products     Lisinopril Other (See Comments)     Cough and sensation of throat swelling/?angioedema    Losartan Rash    Metoprolol Swelling     Tightness in throat    Spironolactone      Throat tightening    Tramadol Other (See Comments)     Dizzy and weak    Verapamil (bulk) Palpitations    Voltaren [diclofenac sodium] Other (See Comments)     Drops blood pressure          OBJECTIVE       Posture Alignment: forward head with rounded shoulders and protracted scapula, slight increase in upper thoracic kyphosis.   Palpation: unremarkable for elicited pain in the upper cervical / sub-occipital, mid cervical, lateral cervical, cervical and upper thoracic spinous processes.   Sensation: Light Touch: Intact    Range of Motion/Strength:     Cervical/Thoracic AROM: Pain/Dysfunction with Movement:    Flexion                     40 DP   Extension                  30 DP   Right side bending             20 NP   Left side bending                10 NP   Right rotation              <25%  DN   Left rotation                <25% DN       UPPER EXTREMITY STRENGTH:   Left Right   Shoulder Flexion 4/5 3-/5   Shoulder Abduction 4/5 2-/5     Shoulder Internal Rotation 4/5 4-/5   Shoulder External Rotation 4/5 4-/5   Elbow Flexion 4+/5 4+/5   Elbow Extension 4+/5 4+/5   Wrist Flexion 4/5 4/5   Wrist Extension 4/5 4/5       DTR's:   Left Right   Biceps Tendon 2+ 2+   Brachioradialis 2+ 2+   Triceps Tendon 2+ 2+             Selective Functional Movement Assessment:  FN: functional, non-painful  FP: functional, painful  DP: dysfunctional, painful  DN: dysfunctional, non-painful    Active Cervical Flexion: see above   Active Cervical Extension: see above   Cervical Rotation:  Right: see above   Left: see above     Cervical Break out patterns   Supine   Flex   -Active - DN  -Passive - DN  OA   -active  R - DN  L - DN    Rotation   -Active -   R - DP right lateral neck   L - DP right lateral neck   -Passive -   R - DP right side   L - DN     C1C2  -Active-     R - DP right side   L - DP right side     Extension   Active- DN       Limitation/Restriction for FOTO neck Survey    Therapist reviewed FOTO scores for Gerda Lai on 12/5/2024.   FOTO documents entered into [employer] (powering [employer]) - see Media section.    Limitation Score: 50%         TREATMENT     Total Treatment time (time-based codes) separate from Evaluation: 0 minutes      Gerda received the treatments listed below:      therapeutic exercises to develop strength, endurance, ROM, and flexibility for 0 minutes including:  -    manual therapy techniques:  were applied to the: - for - minutes, including:  -    neuromuscular re-education activities to improve: Balance, Proprioception, and stability for - minutes. The following activities were included:  -    therapeutic activities to  improve functional performance for -  minutes, including:  -    gait training to improve functional mobility and safety for -  minutes, including:  -    PATIENT EDUCATION AND HOME EXERCISES     Education provided:   --Findings of evaluation and examination, and affect of these on plan for treatment  -Prognosis and expectations  -Home exercise program and expectations of therapy     Written Home Exercises Provided: no.     ASSESSMENT     Gerda is a 89 y.o. female referred to outpatient Physical Therapy with a medical diagnosis of Neck pain [M54.2] . Patient presents with clinical findings of an OA upper cervical flexion mobility dysfunction and a possible cervical spine flexion mobility dysfunction and C1C2 cervical rotation pain dysfunction. Lack of mobility and strength in the RUE due to RTC deficit and  mobility dysfunction.     Patient prognosis is Fair.   Patient will benefit from skilled outpatient Physical Therapy to address the deficits stated above and in the chart below, provide patient /family education, and to maximize patientt's level of independence.     Plan of care discussed with patient: Yes  Patient's spiritual, cultural and educational needs considered and patient is agreeable to the plan of care and goals as stated below:     Anticipated Barriers for therapy: none    Medical Necessity is demonstrated by the following  History  Co-morbidities and personal factors that may impact the plan of care [x] LOW: no personal factors / co-morbidities  [] MODERATE: 1-2 personal factors / co-morbidities  [] HIGH: 3+ personal factors / co-morbidities    Moderate / High Support Documentation:   Co-morbidities affecting plan of care: none    Personal Factors:   no deficits     Examination  Body Structures and Functions, activity limitations and participation restrictions that may impact the plan of care [x] LOW: addressing 1-2 elements  [] MODERATE: 3+ elements  [] HIGH: 4+ elements (please support  below)    Moderate / High Support Documentation: -     Clinical Presentation [x] LOW: stable  [] MODERATE: Evolving  [] HIGH: Unstable     Decision Making/ Complexity Score: low       Goals:    Goals to be met:      Short Term GOALS: 5 weeks. Pt agrees with goals set.  1. Pts pain intensity decreased 20-40% for improved quality of life and functional mobility ONGOING  2. Pt to report reduced tightness associated with neck movements for improved quality of movement ONGOING   3. Pt to demonstrate increase neck mobility in all motions with improved flexibility in the cervical and suprascapular musculature for improved neck movements. ONGOING  4. Pt to exhibit increased neck mobility in C!C2 rotation w/o end range pain for improved movement quality. ONGOING  5. Patient demonstrates correct movement patterns associated with exercises for self management and independence with HEP ONGOING   6 Patient demonstrates independence with Postural Awareness and correction for self management ONGOING     Long Term GOALS: 10 weeks. Pt agrees with goals set.  1. Patient demonstrates increased scapulothoracic strength and endurance to improve tolerance to functional activities. ONGOING   2. Patient demonstrates increased cervical muscle strength for improved stability and tolerance to functional activities. ONGOING   3. Pt demonstrates neck movement patterns without end range pain for improved quality of daily activities.ONGOING  4 Pts pain level decreased to 75% or greater  for restoring functional mobility and ADL ONGOING  5. Pt demonstrates increased upper cervical OA and C1C2  / lower cervical rotation  movement patterns for improved mobility.ONGOING              Plan     Plan of care Certification: 12/5/2024 to 3/5/2024.    Outpatient Physical Therapy 2 times weekly for 10 weeks to include the following interventions: Manual Therapy, Neuromuscular Re-ed, Patient Education, Therapeutic Activities, and Therapeutic Exercise.     Don  Gene, PT, DPT

## 2024-12-07 NOTE — TELEPHONE ENCOUNTER
No care due was identified.  [facility] Embedded Care Due Messages. Reference number: [id number].   12/07/2024 11:45:55 AM CST

## 2024-12-08 RX ORDER — ALISKIREN 300 MG/1
TABLET, FILM COATED ORAL
Qty: 90 TABLET | Refills: 3 | Status: SHIPPED | OUTPATIENT
Start: 2024-12-08

## 2024-12-11 ENCOUNTER — CLINICAL SUPPORT (OUTPATIENT)
Dept: REHABILITATION | Facility: HOSPITAL | Age: 89
End: 2024-12-11
Payer: MEDICARE

## 2024-12-11 DIAGNOSIS — M99.01 SEGMENTAL AND SOMATIC DYSFUNCTION OF CERVICAL REGION: ICD-10-CM

## 2024-12-11 DIAGNOSIS — M43.6 STIFFNESS OF NECK: Primary | ICD-10-CM

## 2024-12-11 PROCEDURE — 97112 NEUROMUSCULAR REEDUCATION: CPT | Mod: PO | Performed by: PHYSICAL THERAPIST

## 2024-12-11 PROCEDURE — 97140 MANUAL THERAPY 1/> REGIONS: CPT | Mod: PO | Performed by: PHYSICAL THERAPIST

## 2024-12-11 NOTE — PROGRESS NOTES
[facility] OUTPATIENT THERAPY AND WELLNESS   Physical Therapy Treatment Note     Name: Gerda Lai  Clinic Number: [id number]    Therapy Diagnosis:   Encounter Diagnoses   Name Primary?    Stiffness of neck Yes    Segmental and somatic dysfunction of cervical region      Physician: Andi Cisneros,*    Visit Date: 12/11/2024    Physician Orders: PT Eval and Treat neck   Medical Diagnosis from Referral:   Neck pain [M54.2]   Evaluation Date: 12/5/2024  Authorization Period Expiration: 11/25/2025  Plan of Care Expiration: 3/5/2025  Progress Note Due: 1/5/2025  Visit # / Visits authorized: 1/ 1, 21 / 26 = first neck Tx.       Foto  Date / Visit# Score    #1/3 12/5/2024-1 50%   #2/3       #3/3          PTA Visit #: -/5     Precautions: Standard    Time In: 1240  Time Out: 1335  Total Billable Time: 55 minutes      SUBJECTIVE     Pt reports: the neck is not real bad. There is ever so slight pain.   She was not issued a home exercise program at [facility]  Response to previous treatment: [facility] performed   Functional change: ongoing    Pain: .5/10  Location: left neck        OBJECTIVE           TREATMENT        Gerda received the treatments listed below:     .BOLD INDICATES ACTIVITIES PERFORMED / DISCUSSED     Leg press   Recumbent bike  Upright bike   UE ergometer  Treadmill   Elliptical          THERAPEUTIC EXERCISES to develop  strength, endurance, ROM, and flexibility for - minutes including   SUPINE  -rotation MWM x12  -    SIDELYING  -  -    PRONE  -  -  STANDING.  -  SITTING  -     NEUROMUSCULAR RE-EDUCATION activities to improve: Balance, Proprioception, motor control and stability  for 25 minutes. The following activities were included:  SUPINE  -chin tucks x20  -nodding x15  -neck flexion x12   -rotation x15   -rhythmic stabilization of the lateral cervical flexors     SIDELYING  -lateral head lift x15   -    PRONE  -  -  STANDING.  -  SITTING  -     THERAPEUTIC ACTIVITIES to improve functional  performance for -  minutes, including:  -     MANUAL THERAPY TECHNIQUES  were applied to the cervical spine  for 20 minutes.  -cervical distraction  -cervical distraction with PAs of the cervical spine C3> C6  -SOR  -cephalocaudad oscillations   -lateral glides     MODALITY      PATIENT EDUCATION AND HOME EXERCISES     Home Exercises Provided and Patient Education Provided     Education provided:   --Findings of evaluation and examination, and affect of these on plan for treatment  -Prognosis and expectations  -Home exercise program and expectations of therapy     Written Home Exercises Provided: no  ASSESSMENT     Gerda responded to the exercises and manual interventions very well. She reported feeling the neck was moving easier and there was not any pain. Progress with  the exercises and add traction.     Gerda Is progressing towards her goals.   Pt prognosis is Fair.     Pt will continue to benefit from skilled outpatient physical therapy to address the deficits listed in the problem list box on initial evaluation, provide pt/family education and to maximize pt's level of independence in the home and community environment.     Pt's spiritual, cultural and educational needs considered and pt agreeable to plan of care and goals.     Anticipated barriers to physical therapy: none     Goals:   Short Term GOALS: 5 weeks. Pt agrees with goals set.  1. Pts pain intensity decreased 20-40% for improved quality of life and functional mobility ONGOING  2. Pt to report reduced tightness associated with neck movements for improved quality of movement ONGOING   3. Pt to demonstrate increase neck mobility in all motions with improved flexibility in the cervical and suprascapular musculature for improved neck movements. ONGOING  4. Pt to exhibit increased neck mobility in C!C2 rotation w/o end range pain for improved movement quality. ONGOING  5. Patient demonstrates correct movement patterns associated with exercises for self  management and independence with HEP ONGOING   6 Patient demonstrates independence with Postural Awareness and correction for self management ONGOING      Long Term GOALS: 10 weeks. Pt agrees with goals set.  1. Patient demonstrates increased scapulothoracic strength and endurance to improve tolerance to functional activities. ONGOING   2. Patient demonstrates increased cervical muscle strength for improved stability and tolerance to functional activities. ONGOING   3. Pt demonstrates neck movement patterns without end range pain for improved quality of daily activities.ONGOING  4 Pts pain level decreased to 75% or greater  for restoring functional mobility and ADL ONGOING  5. 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   Outpatient Physical Therapy 2 times weekly for 10 weeks to include the following interventions: Manual Therapy, Neuromuscular Re-ed, Patient Education, Therapeutic Activities, and Therapeutic Exercise.     Albert Mills, PT, DPT

## 2024-12-16 ENCOUNTER — CLINICAL SUPPORT (OUTPATIENT)
Dept: PAIN MEDICINE | Facility: CLINIC | Age: 89
End: 2024-12-16
Payer: MEDICARE

## 2024-12-16 VITALS
DIASTOLIC BLOOD PRESSURE: 61 MMHG | WEIGHT: 129.88 LBS | HEIGHT: 62 IN | SYSTOLIC BLOOD PRESSURE: 103 MMHG | HEART RATE: 60 BPM | BODY MASS INDEX: 23.9 KG/M2

## 2024-12-16 DIAGNOSIS — M67.951 TENDINOPATHY OF RIGHT GLUTEAL REGION: ICD-10-CM

## 2024-12-16 DIAGNOSIS — M70.61 TROCHANTERIC BURSITIS OF RIGHT HIP: Primary | ICD-10-CM

## 2024-12-16 PROCEDURE — 20611 DRAIN/INJ JOINT/BURSA W/US: CPT | Mod: S$PBB,RT,, | Performed by: STUDENT IN AN ORGANIZED HEALTH CARE EDUCATION/TRAINING PROGRAM

## 2024-12-16 PROCEDURE — 99499 UNLISTED E&M SERVICE: CPT | Mod: S$PBB,,, | Performed by: STUDENT IN AN ORGANIZED HEALTH CARE EDUCATION/TRAINING PROGRAM

## 2024-12-16 PROCEDURE — 99999 PR PBB SHADOW E&M-EST. PATIENT-LVL III: CPT | Mod: PBBFAC,,, | Performed by: STUDENT IN AN ORGANIZED HEALTH CARE EDUCATION/TRAINING PROGRAM

## 2024-12-16 PROCEDURE — 20611 DRAIN/INJ JOINT/BURSA W/US: CPT | Mod: PBBFAC | Performed by: STUDENT IN AN ORGANIZED HEALTH CARE EDUCATION/TRAINING PROGRAM

## 2024-12-16 PROCEDURE — 99999PBSHW PR PBB SHADOW TECHNICAL ONLY FILED TO HB: Mod: PBBFAC,,,

## 2024-12-16 RX ORDER — TRIAMCINOLONE ACETONIDE 40 MG/ML
40 INJECTION, SUSPENSION INTRA-ARTICULAR; INTRAMUSCULAR
Status: COMPLETED | OUTPATIENT
Start: 2024-12-16 | End: 2024-12-16

## 2024-12-16 RX ADMIN — TRIAMCINOLONE ACETONIDE 40 MG: 40 INJECTION, SUSPENSION INTRA-ARTICULAR; INTRAMUSCULAR at 09:12

## 2024-12-16 NOTE — PROGRESS NOTES
HPI  Patient presenting for right GT and gluteal tendon injection     Patient on Anti-coagulation No    No health changes since previous encounter    Past Medical History:   Diagnosis Date    Anxiety     Arthritis     Basal cell carcinoma 09/2016    right post auricular neck     Basal cell carcinoma of right forearm 04/19/2023    R lower forearm    Breast cancer 1992    right    Cataract     Fibromyalgia     History of measles as a child     Hyperlipidemia     Hypertension     Personal history of colonic polyps     Pneumonia     SCC (squamous cell carcinoma) 2015    R chest    SCC (squamous cell carcinoma) 2016    left medial shoulder    SCC (squamous cell carcinoma) 2017    left knee    SCC (squamous cell carcinoma) 2022    L upper chest, R upper arm KA types    Shingles     Squamous cell carcinoma 2015    right forearm    Thyroid disease     Vaginitis      Past Surgical History:   Procedure Laterality Date    ADENOIDECTOMY      ARTHROPLASTY, KNEE, TOTAL, USING COMPUTER-ASSISTED NAVIGATION Right 5/22/2023    Procedure: ARTHROPLASTY, KNEE, TOTAL, USING COMPUTER-ASSISTED NAVIGATION: QUYEN: RIGHT: MARTHA - TRIATHALON;  Surgeon: Milton Cobian III, MD;  Location: [facility] OR;  Service: Orthopedics;  Laterality: Right;    BREAST BIOPSY Left     Excisional bx, benign    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    CARPAL TUNNEL RELEASE Right 3/16/2021    Procedure: RELEASE, CARPAL TUNNEL;  Surgeon: Barbara Aldridge MD;  Location: [facility] OR;  Service: Orthopedics;  Laterality: Right;    CATARACT EXTRACTION W/  INTRAOCULAR LENS IMPLANT Bilateral     EPIDURAL STEROID INJECTION N/A 2/24/2023    Procedure: HEIDI C7-T1;  Surgeon: Andi Cisneros DO;  Location: [facility] OR;  Service: Pain Management;  Laterality: N/A;    EPIDURAL STEROID INJECTION INTO CERVICAL SPINE N/A 6/10/2024    Procedure: C7-T1 HEIDI (toward the left);  Surgeon: Andi Cisneros DO;  Location: [facility] PAIN MANAGEMENT;  Service: Pain Management;  Laterality:  N/A;  RN sed w/Versed and Benadryl    EYE SURGERY      HAND SURGERY      INJECTION OF ANESTHETIC AGENT AROUND MEDIAL BRANCH NERVES INNERVATING CERVICAL FACET JOINT N/A 1/4/2022    Procedure: Cervical Medial Branch Block C5-6, C6-7 (No Sedation);  Surgeon: Himanshu Blackmon Jr., MD;  Location: [facility] PAIN MGT;  Service: Pain Management;  Laterality: N/A;    INJECTION OF ANESTHETIC AGENT AROUND MEDIAL BRANCH NERVES INNERVATING LUMBAR FACET JOINT Left 11/18/2021    Procedure: Cervical Medial Branch Block Left C5-6, C6-7 (IV Sedation);  Surgeon: Himanshu Blackmon Jr., MD;  Location: [facility] PAIN MGT;  Service: Pain Management;  Laterality: Left;    JOINT REPLACEMENT Right     ELZBIETA    KNEE ARTHROPLASTY Left 8/10/2020    Procedure: ARTHROPLASTY, KNEE-ADE NEXGEN/CEMENTED TIBIA;  Surgeon: Kalin Pacheco MD;  Location: [facility] OR;  Service: Orthopedics;  Laterality: Left;    RADIOFREQUENCY THERMAL COAGULATION OF MEDIAL BRANCH OF POSTERIOR RAMUS OF CERVICAL SPINAL NERVE Left 3/8/2022    Procedure: RADIOFREQUENCY THERMAL COAGULATION, NERVE, SPINAL, CERVICAL, LEFT C5-6 AND C6-7;  Surgeon: Himanshu Blackmon Jr., MD;  Location: [facility] PAIN MGT;  Service: Pain Management;  Laterality: Left;  NO PACEMAKER   ASA    thyriodectomy      partial    tonsillectomy      TONSILLECTOMY      TOTAL HIP ARTHROPLASTY      right    Yag  Left      Review of patient's allergies indicates:   Allergen Reactions    Sulfa (sulfonamide antibiotics) Rash    Clarithromycin Other (See Comments)     Weak, extreme fatigue. dizziness    Flexeril [cyclobenzaprine] Other (See Comments)     Dizziness    Iodine and iodide containing products     Lisinopril Other (See Comments)     Cough and sensation of throat swelling/?angioedema    Losartan Rash    Metoprolol Swelling     Tightness in throat    Spironolactone      Throat tightening    Tramadol Other (See Comments)     Dizzy and weak    Verapamil (bulk) Palpitations    Voltaren [diclofenac sodium] Other (See  "Comments)     Drops blood pressure      Current Outpatient Medications   Medication Sig    acetaminophen (TYLENOL) 650 MG TbSR Take 1 tablet (650 mg total) by mouth every 8 (eight) hours.    aliskiren (TEKTURNA) 300 MG Tab TAKE 1 TABLET BY MOUTH EVERY DAY    amLODIPine (NORVASC) 5 MG tablet Take 1 tablet (5 mg total) by mouth once daily.    aspirin (ECOTRIN) 81 MG EC tablet TAKE 1 TABLET BY MOUTH EVERY DAY    b complex vitamins tablet Take 1 tablet by mouth once daily.    coenzyme Q10 100 mg capsule Take 100 mg by mouth once daily.    ezetimibe (ZETIA) 10 mg tablet TAKE 1 TABLET BY MOUTH EVERY DAY    glucosamine-chondroitin 500-400 mg tablet Take 1 tablet by mouth once daily.     hydrALAZINE (APRESOLINE) 25 MG tablet Take 1 tablet (25 mg total) by mouth 2 (two) times daily as needed (for systolic blood pressure >160).    labetaloL (NORMODYNE) 100 MG tablet TAKE ONE AND ONE-HALF TABLETS BY MOUTH EVERY 12 HOURS    LORazepam (ATIVAN) 0.5 MG tablet TAKE 1/2 TABLET TO 1 TABLET BY MOUTH EVERY 12 HOURS AS NEEDED FOR ANXIETY    multivitamin capsule Take 1 capsule by mouth once daily.    omeprazole (PRILOSEC OTC) 20 MG tablet Take 20 mg by mouth once daily.    pravastatin (PRAVACHOL) 80 MG tablet TAKE 1 TABLET (80 MG TOTAL) BY MOUTH ONCE DAILY.    psyllium husk, aspartame, (METAMUCIL) 3.4 gram PwPk packet Take 1 packet by mouth once daily.    TURMERIC ORAL Take 500 mg by mouth once daily.     vitamin D (VITAMIN D3) 1000 units Tab Take 1,000 Units by mouth once daily.     No current facility-administered medications for this visit.       PMHx, PSHx, Allergies, Medications reviewed in epic    ROS negative except pain complaints in HPI    OBJECTIVE:    /61 (BP Location: Left arm, Patient Position: Sitting)   Pulse 60   Ht 5' 2" (1.575 m)   Wt 58.9 kg (129 lb 13.6 oz)   BMI 23.75 kg/m²     PHYSICAL EXAMINATION:    GENERAL: Well appearing, in no acute distress, alert and oriented x3.  PSYCH:  Mood and affect " appropriate.  SKIN: Skin color, texture, turgor normal, no rashes or lesions which will impact the procedure.  CV: RRR with palpation of the radial artery.  PULM: No evidence of respiratory difficulty, symmetric chest rise. Clear to auscultation.  NEURO: Cranial nerves grossly intact.    Plan:    Proceed with procedure as planned    Andi Wilson  12/16/2024

## 2024-12-17 NOTE — PROCEDURES
Procedures  PROCEDURE: right Greater Trochanteric Bursa Injection with Ultrasound Guidance    PATIENT NAME: Gerda Lai   MRN: [id number]     DATE OF PROCEDURE: 12/16/2024    Patient Name: Gerda Lai  MRN: [id number]    PROCEDURE DATE: 12/16/2024    Diagnosis: Trochanteric Bursitis  CPT code:  64825 (ARTHROCENTESIS, ASPIRATION AND/OR INJECTION, MAJOR JOINT OR BURSA (EG, SHOULDER, HIP, KNEE, SUBACROMIAL BURSA); WITH ULTRASOUND GUIDANCE, WITH PERMANENT RECORDING AND REPORTING)  Postprocedural Diagnosis: Same  Needle Type: - Stimuplex 21G x 100mm needle    Solution injected: A 6ml mixture of 0.25% Bupivacaine and 40mg Kenalog to each location    Estimated Blood Loss - <2ml  Drains: None  Specimens Removed: None  Urine Output - Not Measured  Complications: None  Outcome: Good  VAS Before Injection:  0/10  VAS After Injection:  0/10    Informed Consent:  The patient's condition and proposed procedures, risks (including complications of nerve damage,  bleeding, infection, and failure of pain relief), and alternatives were discussed with the patient or responsible party.  The patient's/responsible party's questions were answered.  The patient/responsible party appeared to understand and chose to proceed.  Informed consent was obtained.    Procedure in Detail:  The procedure was performed in the procedure treatment room.  After obtaining written consent, the patient was placed in a lateral position. The target site of injection was identified using the ultrasound in the short axis view. Any vascular structure located close to the injection site were also noted. The skin overlying the target site of injection was prepped and draped in an aseptic fashion.  The skin entry site was identified and marked.     Procedural Pause:  A procedural pause verifying correct patient, medical record number, allergies, medications to be administered, current vital signs, and surgical site was performed immediately prior to beginning the  procedure.    The greater trochanteric bursa and surrounding structures (structures identified: anterior, lateral, and posterior facets ; glut medius and santiago tendons) were identified using ultrasound guidance in a short-axis view at the lateral hip. The skin and subcutaneous tissue overlying the target site of injection was anesthetized using 3 ml of 1% lidocaine MPF with a 27-gauge, 1½ -inch needle. The block needle was inserted and advanced under live US guidance using an in-plane view and hydrodissecting with normal saline as needed, keeping the needle perpendicular to the beam, with an endpoint at the bursal space between the glut med and max tendons. There was no evidence of heme or paresthesia. Subsequently, the injectate mixture was slowly and incrementally injected without resistance. The needle was then retracted.    The patient tolerated the procedure well and without complications. After meeting discharge criteria, the patient was discharged home safely.    The above noted procedure was not repeated on the opposite side.    DISCUSSION:  Today we performed a *Greater Trochanteric Bursa injection.  The patient was informed that it may take several days for the steroid medication to reach its full efficacy and they should continue their regular pain medications as prescribed.  The patient was advised to relax and avoid any heavy lifting or excessive bending for 24 hours.   She was advised that she may return to her usual activities after 24 hours if she is otherwise feeling well.  The patient was advised not to bathe or soak in water for 24 hours but that showering would be acceptable.  The patient was instructed that if she experienced fever or chills, new weakness, new sensory changes, any changes in bowel or bladder habits, worsening back pain, new headache, neck stiffness, or other new symptoms, that she should contact the pain clinic immediately or dial 911 if unable to reach the pain  clinic.      Note Electronically Signed By:  Andi Wilson  12/16/2024

## 2024-12-18 ENCOUNTER — CLINICAL SUPPORT (OUTPATIENT)
Dept: REHABILITATION | Facility: HOSPITAL | Age: 89
End: 2024-12-18
Payer: MEDICARE

## 2024-12-18 DIAGNOSIS — M99.01 SEGMENTAL AND SOMATIC DYSFUNCTION OF CERVICAL REGION: ICD-10-CM

## 2024-12-18 DIAGNOSIS — M43.6 STIFFNESS OF NECK: Primary | ICD-10-CM

## 2024-12-18 PROCEDURE — 97112 NEUROMUSCULAR REEDUCATION: CPT | Mod: PO | Performed by: PHYSICAL THERAPIST

## 2024-12-18 PROCEDURE — 97140 MANUAL THERAPY 1/> REGIONS: CPT | Mod: PO | Performed by: PHYSICAL THERAPIST

## 2024-12-18 NOTE — PROGRESS NOTES
[facility]   Physical Therapy Treatment Note     Name: Gerda Lai  Clinic Number: [id number]    Therapy Diagnosis:   Encounter Diagnoses   Name Primary?    Stiffness of neck Yes    Segmental and somatic dysfunction of cervical region      Physician: Andi Cisneros,*    Visit Date: 12/18/2024    Physician Orders: PT Eval and Treat neck   Medical Diagnosis from Referral:   Neck pain [M54.2]   Evaluation Date: 12/5/2024  Authorization Period Expiration: 11/25/2025  Plan of Care Expiration: 3/5/2025  Progress Note Due: 1/5/2025  Visit # / Visits authorized: 1/ 1, 22 / 26 = #2 neck      Foto  Date / Visit# Score    #1/3 12/5/2024-1 50%   #2/3       #3/3          PTA Visit #: -/5     Precautions: Standard    Time In: 1435  Time Out: 1545  Total Billable Time: 70 minutes      SUBJECTIVE     Pt reports: the neck is fine and there is no pain. My biggest problem is ROM.    She was not issued a home exercise program at   Response to previous treatment: did fine   Functional change: ongoing    Pain: 0/ 10  Location: left neck        OBJECTIVE           TREATMENT        Gerda received the treatments listed below:     .BOLD INDICATES ACTIVITIES PERFORMED / DISCUSSED     Leg press   Recumbent bike  Upright bike   UE ergometer  Treadmill   Elliptical          THERAPEUTIC EXERCISES to develop  strength, endurance, ROM, and flexibility for  minutes including   SUPINE  -rotation MWM x15  -    SIDELYING  -  -    PRONE  -  -  STANDING.  -  SITTING  -     NEUROMUSCULAR RE-EDUCATION activities to improve: Balance, Proprioception, motor control and stability  for 35 minutes. The following activities were included:  SUPINE  -chin tucks x20  -nodding x15  -neck flexion x12   -rotation x15   -rhythmic stabilization of the lateral cervical flexors     SIDELYING  -lateral head lift x15   -    PRONE  -  -  STANDING.  -  SITTING  -     THERAPEUTIC ACTIVITIES to improve functional performance for -   minutes, including:  -     MANUAL THERAPY TECHNIQUES  were applied to the cervical spine  for 20 minutes.  -cervical distraction  -cervical distraction with PAs of the cervical spine C3> C6  -SOR  -cephalocaudad oscillations   -lateral glides     MODALITY      PATIENT EDUCATION AND HOME EXERCISES     Home Exercises Provided and Patient Education Provided     Education provided:   --Findings of evaluation and examination, and affect of these on plan for treatment  -Prognosis and expectations  -Home exercise program and expectations of therapy     Written Home Exercises Provided: no  ASSESSMENT     The patient performed the routine without pain.  She exhibits poor cervical mobility that compromises her execution which is demonstrated with chin tucks and nodding. The right shoulder also inhibits her ability to perform ST activities and possibly exercises that require use of the arms as it relates to over stretch using the towel.     Gerda Is progressing towards her goals.   Pt prognosis is Fair.     Pt will continue to benefit from skilled outpatient physical therapy to address the deficits listed in the problem list box on initial evaluation, provide pt/family education and to maximize pt's level of independence in the home and community environment.     Pt's spiritual, cultural and educational needs considered and pt agreeable to plan of care and goals.     Anticipated barriers to physical therapy: none     Goals:   Short Term GOALS: 5 weeks. Pt agrees with goals set.  1. Pts pain intensity decreased 20-40% for improved quality of life and functional mobility ONGOING  2. Pt to report reduced tightness associated with neck movements for improved quality of movement ONGOING   3. Pt to demonstrate increase neck mobility in all motions with improved flexibility in the cervical and suprascapular musculature for improved neck movements. ONGOING  4. Pt to exhibit increased neck mobility in C!C2 rotation w/o end range pain  for improved movement quality. ONGOING  5. Patient demonstrates correct movement patterns associated with exercises for self management and independence with HEP ONGOING   6 Patient demonstrates independence with Postural Awareness and correction for self management ONGOING      Long Term GOALS: 10 weeks. Pt agrees with goals set.  1. Patient demonstrates increased scapulothoracic strength and endurance to improve tolerance to functional activities. ONGOING   2. Patient demonstrates increased cervical muscle strength for improved stability and tolerance to functional activities. ONGOING   3. Pt demonstrates neck movement patterns without end range pain for improved quality of daily activities.ONGOING  4 Pts pain level decreased to 75% or greater  for restoring functional mobility and ADL ONGOING  5. 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   Outpatient Physical Therapy 2 times weekly for 10 weeks to include the following interventions: Manual Therapy, Neuromuscular Re-ed, Patient Education, Therapeutic Activities, and Therapeutic Exercise.     Albert Mills, PT, DPT

## 2024-12-23 ENCOUNTER — CLINICAL SUPPORT (OUTPATIENT)
Dept: REHABILITATION | Facility: HOSPITAL | Age: 89
End: 2024-12-23
Payer: MEDICARE

## 2024-12-23 DIAGNOSIS — M99.01 SEGMENTAL AND SOMATIC DYSFUNCTION OF CERVICAL REGION: ICD-10-CM

## 2024-12-23 DIAGNOSIS — M43.6 STIFFNESS OF NECK: Primary | ICD-10-CM

## 2024-12-23 PROCEDURE — 97112 NEUROMUSCULAR REEDUCATION: CPT | Mod: PO | Performed by: PHYSICAL THERAPIST

## 2024-12-23 PROCEDURE — 97140 MANUAL THERAPY 1/> REGIONS: CPT | Mod: PO | Performed by: PHYSICAL THERAPIST

## 2024-12-23 NOTE — PROGRESS NOTES
"    [facility]   Physical Therapy Treatment Note     Name: Gerda Lai  Clinic Number: [id number]    Therapy Diagnosis:   Encounter Diagnoses   Name Primary?    Stiffness of neck Yes    Segmental and somatic dysfunction of cervical region      Physician: Andi Cisneros,*    Visit Date: 12/23/2024    Physician Orders: PT Eval and Treat neck   Medical Diagnosis from Referral:   Neck pain [M54.2]   Evaluation Date: 12/5/2024  Authorization Period Expiration: 11/25/2025  Plan of Care Expiration: 3/5/2025  Progress Note Due: 1/5/2025  Visit # / Visits authorized: 1/ 1, 22 / 26 = #2 neck      Foto  Date / Visit# Score    #1/3 12/5/2024-1 50%   #2/3       #3/3          PTA Visit #: -/5     Precautions: Standard    Time In: 1605  Time Out: 1705  Total Billable Time: 60 minutes      SUBJECTIVE     Pt reports: the neck is fine and there is no pain. My biggest problem is ROM.    She was not issued a home exercise program at   Response to previous treatment: did fine   Functional change: ongoing    Pain: 0/ 10  Location: left neck        OBJECTIVE           TREATMENT        Gerda received the treatments listed below:     .BOLD INDICATES ACTIVITIES PERFORMED / DISCUSSED     Leg press   Recumbent bike  Upright bike   UE ergometer  Treadmill   Elliptical          THERAPEUTIC EXERCISES to develop  strength, endurance, ROM, and flexibility for  minutes including   SUPINE  -rotation MWM x15   -neck extensor stretch x15 MWM  -UT stretch hold 10" x 6     SIDE LYING  -  PRONE  -  -  STANDING.  -  SITTING  -     NEUROMUSCULAR RE-EDUCATION activities to improve: Balance, Proprioception, motor control and stability  for 30 minutes. The following activities were included:  SUPINE  -chin tucks x20  -nodding x15  -neck flexion x15   -rotation x15   -rhythmic stabilization of the lateral cervical flexors     SIDELYING  -lateral head lift x15   -    PRONE  -  -  STANDING.  -  SITTING  -     THERAPEUTIC " ACTIVITIES to improve functional performance for -  minutes, including:  -     MANUAL THERAPY TECHNIQUES  were applied to the cervical spine  for 20 minutes.  -cervical distraction  -cervical distraction with PAs of the cervical spine C3> C6  -SOR  -cephalocaudad oscillations   -lateral glides     MODALITY      PATIENT EDUCATION AND HOME EXERCISES     Home Exercises Provided and Patient Education Provided     Education provided:   --Findings of evaluation and examination, and affect of these on plan for treatment  -Prognosis and expectations  -Home exercise program and expectations of therapy     Written Home Exercises Provided: no  ASSESSMENT         Gerda Is progressing towards her goals.   Pt prognosis is Fair.     Pt will continue to benefit from skilled outpatient physical therapy to address the deficits listed in the problem list box on initial evaluation, provide pt/family education and to maximize pt's level of independence in the home and community environment.     Pt's spiritual, cultural and educational needs considered and pt agreeable to plan of care and goals.     Anticipated barriers to physical therapy: none     Goals:   Short Term GOALS: 5 weeks. Pt agrees with goals set.  1. Pts pain intensity decreased 20-40% for improved quality of life and functional mobility ONGOING  2. Pt to report reduced tightness associated with neck movements for improved quality of movement ONGOING   3. Pt to demonstrate increase neck mobility in all motions with improved flexibility in the cervical and suprascapular musculature for improved neck movements. ONGOING  4. Pt to exhibit increased neck mobility in C!C2 rotation w/o end range pain for improved movement quality. ONGOING  5. Patient demonstrates correct movement patterns associated with exercises for self management and independence with HEP ONGOING   6 Patient demonstrates independence with Postural Awareness and correction for self management ONGOING      Long  Term GOALS: 10 weeks. Pt agrees with goals set.  1. Patient demonstrates increased scapulothoracic strength and endurance to improve tolerance to functional activities. ONGOING   2. Patient demonstrates increased cervical muscle strength for improved stability and tolerance to functional activities. ONGOING   3. Pt demonstrates neck movement patterns without end range pain for improved quality of daily activities.ONGOING  4 Pts pain level decreased to 75% or greater  for restoring functional mobility and ADL ONGOING  5. 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   Outpatient Physical Therapy 2 times weekly for 10 weeks to include the following interventions: Manual Therapy, Neuromuscular Re-ed, Patient Education, Therapeutic Activities, and Therapeutic Exercise.     Albert Mills, PT, DPT

## 2024-12-24 NOTE — PROGRESS NOTES
"    [facility]   Physical Therapy Treatment Note     Name: Gerda Lai  Clinic Number: [id number]    Therapy Diagnosis:   Encounter Diagnoses   Name Primary?    Stiffness of neck Yes    Segmental and somatic dysfunction of cervical region        Physician: Andi Cisneros,*    Visit Date: 12/26/2024    Physician Orders: PT Eval and Treat neck   Medical Diagnosis from Referral:   Neck pain [M54.2]   Evaluation Date: 12/5/2024  Authorization Period Expiration: 11/25/2025  Plan of Care Expiration: 3/5/2025  Progress Note Due: 1/5/2025  Visit # / Visits authorized: 1/ 1, 23 / 26 = #3 neck      Foto  Date / Visit# Score    #1/3 12/5/2024-1 50%   #2/3       #3/3          PTA Visit #: 1/5     Precautions: Standard    Time In: 3:04 pm  Time Out: 3:54 pm  Total Billable Time: 25 minutes      SUBJECTIVE     Pt reports: she thinks the exercises are helping  She was not issued a home exercise program at   Response to previous treatment: did fine   Functional change: ongoing    Pain: 0/ 10  Location: left neck        OBJECTIVE           TREATMENT        Gerda received the treatments listed below:     .BOLD INDICATES ACTIVITIES PERFORMED / DISCUSSED     Leg press   Recumbent bike  Upright bike   UE ergometer  Treadmill   Elliptical          THERAPEUTIC EXERCISES to develop  strength, endurance, ROM, and flexibility for 10 minutes including   SUPINE  -rotation MWM x15   -neck extensor stretch x15 MWM  -UT stretch hold 10" x 6     SIDE LYING  -  PRONE  -  -  STANDING.  -  SITTING  -     NEUROMUSCULAR RE-EDUCATION activities to improve: Balance, Proprioception, motor control and stability  for 30 minutes. The following activities were included:  SUPINE  -chin tucks x20  -nodding x15  -neck flexion x15   -rotation x15   -rhythmic stabilization of the lateral cervical flexors     SIDELYING  -lateral head lift x15   -    PRONE  -  -  STANDING.  -  SITTING  -     THERAPEUTIC ACTIVITIES to improve " functional performance for -  minutes, including:  -     MANUAL THERAPY TECHNIQUES  were applied to the cervical spine  for 10 minutes.  -cervical distraction  -cervical distraction with PAs of the cervical spine C3> C6  -SOR  -cephalocaudad oscillations   -lateral glides     MODALITY      PATIENT EDUCATION AND HOME EXERCISES     Home Exercises Provided and Patient Education Provided     Education provided:   --Findings of evaluation and examination, and affect of these on plan for treatment  -Prognosis and expectations  -Home exercise program and expectations of therapy     Written Home Exercises Provided: no  ASSESSMENT     Gerda reports improved mobility following exercises. She tolerated exercises as noted without an increase in pain. Will continue to progress as tolerated.       Gerda Is progressing towards her goals.   Pt prognosis is Fair.     Pt will continue to benefit from skilled outpatient physical therapy to address the deficits listed in the problem list box on initial evaluation, provide pt/family education and to maximize pt's level of independence in the home and community environment.     Pt's spiritual, cultural and educational needs considered and pt agreeable to plan of care and goals.     Anticipated barriers to physical therapy: none     Goals:   Short Term GOALS: 5 weeks. Pt agrees with goals set.  1. Pts pain intensity decreased 20-40% for improved quality of life and functional mobility ONGOING  2. Pt to report reduced tightness associated with neck movements for improved quality of movement ONGOING   3. Pt to demonstrate increase neck mobility in all motions with improved flexibility in the cervical and suprascapular musculature for improved neck movements. ONGOING  4. Pt to exhibit increased neck mobility in C!C2 rotation w/o end range pain for improved movement quality. ONGOING  5. Patient demonstrates correct movement patterns associated with exercises for self management and  independence with HEP ONGOING   6 Patient demonstrates independence with Postural Awareness and correction for self management ONGOING      Long Term GOALS: 10 weeks. Pt agrees with goals set.  1. Patient demonstrates increased scapulothoracic strength and endurance to improve tolerance to functional activities. ONGOING   2. Patient demonstrates increased cervical muscle strength for improved stability and tolerance to functional activities. ONGOING   3. Pt demonstrates neck movement patterns without end range pain for improved quality of daily activities.ONGOING  4 Pts pain level decreased to 75% or greater  for restoring functional mobility and ADL ONGOING  5. 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   Outpatient Physical Therapy 2 times weekly for 10 weeks to include the following interventions: Manual Therapy, Neuromuscular Re-ed, Patient Education, Therapeutic Activities, and Therapeutic Exercise.     Nancy Dsouza, PTA

## 2024-12-26 ENCOUNTER — CLINICAL SUPPORT (OUTPATIENT)
Dept: REHABILITATION | Facility: HOSPITAL | Age: 89
End: 2024-12-26
Payer: MEDICARE

## 2024-12-26 DIAGNOSIS — I10 HYPERTENSION, ESSENTIAL: Chronic | ICD-10-CM

## 2024-12-26 DIAGNOSIS — M43.6 STIFFNESS OF NECK: Primary | ICD-10-CM

## 2024-12-26 DIAGNOSIS — M99.01 SEGMENTAL AND SOMATIC DYSFUNCTION OF CERVICAL REGION: ICD-10-CM

## 2024-12-26 PROCEDURE — 97140 MANUAL THERAPY 1/> REGIONS: CPT | Mod: KX,PO,CQ

## 2024-12-26 PROCEDURE — 97112 NEUROMUSCULAR REEDUCATION: CPT | Mod: KX,PO,CQ

## 2024-12-26 NOTE — TELEPHONE ENCOUNTER
No care due was identified.  [facility] Embedded Care Due Messages. Reference number: [id number].   12/26/2024 5:53:54 PM CST

## 2024-12-27 RX ORDER — LORAZEPAM 0.5 MG/1
TABLET ORAL
Qty: 30 TABLET | Refills: 0 | Status: SHIPPED | OUTPATIENT
Start: 2024-12-27

## 2024-12-27 NOTE — PROGRESS NOTES
"    [facility]   Physical Therapy Treatment Note     Name: Gerda Lai  Clinic Number: [id number]    Therapy Diagnosis:   Encounter Diagnoses   Name Primary?    Stiffness of neck Yes    Segmental and somatic dysfunction of cervical region          Physician: Andi Cisneros,*    Visit Date: 12/30/2024    Physician Orders: PT Eval and Treat neck   Medical Diagnosis from Referral:   Neck pain [M54.2]   Evaluation Date: 12/5/2024  Authorization Period Expiration: 11/25/2025  Plan of Care Expiration: 3/5/2025  Progress Note Due: 1/5/2025  Visit # / Visits authorized: 1/ 1, 25 / 26 = #4 neck      Foto  Date / Visit# Score    #1/3 12/5/2024-1 50%   #2/3       #3/3          PTA Visit #: 1/5     Precautions: Standard    Time In: 2:38 pm  Time Out: 3:22 pm  Total Billable Time: 23 minutes      SUBJECTIVE     Pt reports: no new complaints  She was not issued a home exercise program at [facility]  Response to previous treatment: did fine   Functional change: ongoing    Pain: 0/ 10  Location: left neck        OBJECTIVE           TREATMENT        Gerda received the treatments listed below:     .BOLD INDICATES ACTIVITIES PERFORMED / DISCUSSED     Leg press   Recumbent bike  Upright bike   UE ergometer  Treadmill   Elliptical          THERAPEUTIC EXERCISES to develop  strength, endurance, ROM, and flexibility for 08 minutes including   SUPINE  -rotation MWM x15   -neck extensor stretch x15 MWM  -UT stretch hold 10" x 6     SIDE LYING  -  PRONE  -  -  STANDING.  -  SITTING  -     NEUROMUSCULAR RE-EDUCATION activities to improve: Balance, Proprioception, motor control and stability  for 24 minutes. The following activities were included:  SUPINE  -chin tucks x20  -nodding x15  -neck flexion x15   -rotation x15   -rhythmic stabilization of the lateral cervical flexors     SIDELYING  -lateral head lift x15   -    PRONE  -  -  STANDING.  -  SITTING  -     THERAPEUTIC ACTIVITIES to improve functional " performance for -  minutes, including:  -     MANUAL THERAPY TECHNIQUES  were applied to the cervical spine  for 12 minutes.  -cervical distraction  -cervical distraction with PAs of the cervical spine C3> C6  -SOR  -cephalocaudad oscillations   -lateral glides     MODALITY      PATIENT EDUCATION AND HOME EXERCISES     Home Exercises Provided and Patient Education Provided     Education provided:   --Findings of evaluation and examination, and affect of these on plan for treatment  -Prognosis and expectations  -Home exercise program and expectations of therapy     Written Home Exercises Provided: no  ASSESSMENT     Gerda tolerated exercises without an increase in pain. She has increased difficulty with rotation to the R. She also is unable to use her R UE for upper trap stretch to the right due to shoulder issues. Will continue to progress as tolerated.       Gerda Is progressing towards her goals.   Pt prognosis is Fair.     Pt will continue to benefit from skilled outpatient physical therapy to address the deficits listed in the problem list box on initial evaluation, provide pt/family education and to maximize pt's level of independence in the home and community environment.     Pt's spiritual, cultural and educational needs considered and pt agreeable to plan of care and goals.     Anticipated barriers to physical therapy: none     Goals:   Short Term GOALS: 5 weeks. Pt agrees with goals set.  1. Pts pain intensity decreased 20-40% for improved quality of life and functional mobility ONGOING  2. Pt to report reduced tightness associated with neck movements for improved quality of movement ONGOING   3. Pt to demonstrate increase neck mobility in all motions with improved flexibility in the cervical and suprascapular musculature for improved neck movements. ONGOING  4. Pt to exhibit increased neck mobility in C!C2 rotation w/o end range pain for improved movement quality. ONGOING  5. Patient demonstrates correct  movement patterns associated with exercises for self management and independence with HEP ONGOING   6 Patient demonstrates independence with Postural Awareness and correction for self management ONGOING      Long Term GOALS: 10 weeks. Pt agrees with goals set.  1. Patient demonstrates increased scapulothoracic strength and endurance to improve tolerance to functional activities. ONGOING   2. Patient demonstrates increased cervical muscle strength for improved stability and tolerance to functional activities. ONGOING   3. Pt demonstrates neck movement patterns without end range pain for improved quality of daily activities.ONGOING  4 Pts pain level decreased to 75% or greater  for restoring functional mobility and ADL ONGOING  5. 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   Outpatient Physical Therapy 2 times weekly for 10 weeks to include the following interventions: Manual Therapy, Neuromuscular Re-ed, Patient Education, Therapeutic Activities, and Therapeutic Exercise.     Nancy Dsouza, PTA

## 2024-12-30 ENCOUNTER — CLINICAL SUPPORT (OUTPATIENT)
Dept: REHABILITATION | Facility: HOSPITAL | Age: 89
End: 2024-12-30
Payer: MEDICARE

## 2024-12-30 DIAGNOSIS — M43.6 STIFFNESS OF NECK: Primary | ICD-10-CM

## 2024-12-30 DIAGNOSIS — M99.01 SEGMENTAL AND SOMATIC DYSFUNCTION OF CERVICAL REGION: ICD-10-CM

## 2024-12-30 PROCEDURE — 97112 NEUROMUSCULAR REEDUCATION: CPT | Mod: KX,PO,CQ

## 2024-12-30 PROCEDURE — 97140 MANUAL THERAPY 1/> REGIONS: CPT | Mod: KX,PO,CQ

## 2025-01-06 ENCOUNTER — CLINICAL SUPPORT (OUTPATIENT)
Dept: REHABILITATION | Facility: HOSPITAL | Age: OVER 89
End: 2025-01-06
Payer: MEDICARE

## 2025-01-06 DIAGNOSIS — M99.01 SEGMENTAL AND SOMATIC DYSFUNCTION OF CERVICAL REGION: ICD-10-CM

## 2025-01-06 DIAGNOSIS — M43.6 STIFFNESS OF NECK: Primary | ICD-10-CM

## 2025-01-06 PROCEDURE — 97112 NEUROMUSCULAR REEDUCATION: CPT | Mod: PO | Performed by: PHYSICAL THERAPIST

## 2025-01-06 NOTE — PROGRESS NOTES
"    [facility]   Physical Therapy Treatment Note     Name: Gerda Lai  Clinic Number: [id number]    Therapy Diagnosis:   Encounter Diagnoses   Name Primary?    Stiffness of neck Yes    Segmental and somatic dysfunction of cervical region          Physician: Andi Cisneros,*    Visit Date: 1/6/2025    Physician Orders: PT Eval and Treat neck   Medical Diagnosis from Referral:   Neck pain [M54.2]   Evaluation Date: 12/5/2024  Authorization Period Expiration: 11/25/2025  Plan of Care Expiration: 3/5/2025  Progress Note Due: 1/5/2025  Visit # / Visits authorized: 1/ 1, 25 / 26 = #4 neck      Foto  Date / Visit# Score    #1/3 12/5/2024-1 50%   #2/3       #3/3          PTA Visit #: 1/5     Precautions: Standard    Time In: 1405 pm  Time Out: 1505 pm  Total Billable Time: 60 minutes      SUBJECTIVE     Pt reports: the neck is feeling pretty good this AM. Did not even have to use the asper cream so that is good.   She was not issued a home exercise program at   Response to previous treatment: did fine   Functional change: ongoing    Pain: 0/ 10  Location: left neck        OBJECTIVE           TREATMENT        Gerda received the treatments listed below:     .BOLD INDICATES ACTIVITIES PERFORMED / DISCUSSED     Leg press   Recumbent bike  Upright bike   UE ergometer  Treadmill   Elliptical          THERAPEUTIC EXERCISES to develop  strength, endurance, ROM, and flexibility for 10 minutes including   SUPINE  -rotation MWM x15   -neck extensor stretch x15 MWM  -UT stretch hold 10" x 6     SIDE LYING  -  PRONE  -on forearms for cervical rotation of C1C2 x15  -   STANDING.  -  SITTING  -     NEUROMUSCULAR RE-EDUCATION activities to improve: Balance, Proprioception, motor control and stability  for 30 minutes. The following activities were included:  SUPINE  -chin tucks x20  -nodding x15  -neck flexion x15   -rotation x15   -rhythmic stabilization of the lateral cervical flexors "     SIDELYING  -lateral head lift x15   -    PRONE  -  -  STANDING.  -  SITTING  -     THERAPEUTIC ACTIVITIES to improve functional performance for -  minutes, including:  -     MANUAL THERAPY TECHNIQUES  were applied to the cervical spine  for 15 minutes.  -cervical distraction  -cervical distraction with PAs of the cervical spine C3> C6  -SOR  -cephalocaudad oscillations   -lateral glides   -BLANCA first rib with ST mobs.     MODALITY      PATIENT EDUCATION AND HOME EXERCISES     Home Exercises Provided and Patient Education Provided     Education provided:   --Findings of evaluation and examination, and affect of these on plan for treatment  -Prognosis and expectations  -Home exercise program and expectations of therapy     Written Home Exercises Provided: no  ASSESSMENT     Ms. Lorenz performed the activities noted with direction. She did not experience pain associated with the movements. There was some pain in the right suprascapular area. First rib release appeared to reduce the discomfort. Continue to progress with ROM.     Gerda Is progressing towards her goals.   Pt prognosis is Fair.     Pt will continue to benefit from skilled outpatient physical therapy to address the deficits listed in the problem list box on initial evaluation, provide pt/family education and to maximize pt's level of independence in the home and community environment.     Pt's spiritual, cultural and educational needs considered and pt agreeable to plan of care and goals.     Anticipated barriers to physical therapy: none     Goals:   Short Term GOALS: 5 weeks. Pt agrees with goals set.  1. Pts pain intensity decreased 20-40% for improved quality of life and functional mobility ONGOING  2. Pt to report reduced tightness associated with neck movements for improved quality of movement ONGOING   3. Pt to demonstrate increase neck mobility in all motions with improved flexibility in the cervical and suprascapular musculature for improved  neck movements. ONGOING  4. Pt to exhibit increased neck mobility in C!C2 rotation w/o end range pain for improved movement quality. ONGOING  5. Patient demonstrates correct movement patterns associated with exercises for self management and independence with HEP ONGOING   6 Patient demonstrates independence with Postural Awareness and correction for self management ONGOING      Long Term GOALS: 10 weeks. Pt agrees with goals set.  1. Patient demonstrates increased scapulothoracic strength and endurance to improve tolerance to functional activities. ONGOING   2. Patient demonstrates increased cervical muscle strength for improved stability and tolerance to functional activities. ONGOING   3. Pt demonstrates neck movement patterns without end range pain for improved quality of daily activities.ONGOING  4 Pts pain level decreased to 75% or greater  for restoring functional mobility and ADL ONGOING  5. 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   Outpatient Physical Therapy 2 times weekly for 10 weeks to include the following interventions: Manual Therapy, Neuromuscular Re-ed, Patient Education, Therapeutic Activities, and Therapeutic Exercise.     Albert Mills, PT, DPT

## 2025-01-08 ENCOUNTER — CLINICAL SUPPORT (OUTPATIENT)
Dept: REHABILITATION | Facility: HOSPITAL | Age: OVER 89
End: 2025-01-08
Payer: MEDICARE

## 2025-01-08 DIAGNOSIS — M43.6 STIFFNESS OF NECK: Primary | ICD-10-CM

## 2025-01-08 DIAGNOSIS — M99.01 SEGMENTAL AND SOMATIC DYSFUNCTION OF CERVICAL REGION: ICD-10-CM

## 2025-01-08 PROCEDURE — 97112 NEUROMUSCULAR REEDUCATION: CPT | Mod: PO | Performed by: PHYSICAL THERAPIST

## 2025-01-08 PROCEDURE — 97110 THERAPEUTIC EXERCISES: CPT | Mod: PO | Performed by: PHYSICAL THERAPIST

## 2025-01-08 NOTE — PROGRESS NOTES
"    [facility]   Physical Therapy Treatment Note     Name: Gerda Lai  Clinic Number: [id number]    Therapy Diagnosis:   Encounter Diagnoses   Name Primary?    Stiffness of neck Yes    Segmental and somatic dysfunction of cervical region          Physician: Andi Cisneros,*    Visit Date: 1/8/2025    Physician Orders: PT Eval and Treat neck   Medical Diagnosis from Referral:   Neck pain [M54.2]   Evaluation Date: 12/5/2024  Authorization Period Expiration: 11/25/2025  Plan of Care Expiration: 3/5/2025  Progress Note Due: 1/5/2025  Visit # / Visits authorized: 1/ 1, 25 / 26 = #4 neck      Foto  Date / Visit# Score    #1/3 12/5/2024-1 50%   #2/3       #3/3          PTA Visit #: 1/5     Precautions: Standard    Time In: 1535 pm  Time Out: 1635 pm  Total Billable Time: 60 minutes      SUBJECTIVE     Pt reports: the neck is OK today.  Have not been having any pain lately.   She was not issued a home exercise program at   Response to previous treatment: did fine   Functional change: ongoing    Pain: 0/ 10  Location: left neck        OBJECTIVE           TREATMENT        Gerda received the treatments listed below:     .BOLD INDICATES ACTIVITIES PERFORMED / DISCUSSED     Leg press   Recumbent bike  Upright bike   UE ergometer  Treadmill   Elliptical          THERAPEUTIC EXERCISES to develop  strength, endurance, ROM, and flexibility for 10 minutes including   SUPINE  -rotation MWM x15   -neck extensor stretch x15 MWM  -UT stretch hold 10" x 6     SIDE LYING  -  PRONE  -on forearms for cervical rotation of C1C2 x15  -   STANDING.  -  SITTING  -     NEUROMUSCULAR RE-EDUCATION activities to improve: Balance, Proprioception, motor control and stability  for 30 minutes. The following activities were included:  SUPINE  -chin tucks x20  -nodding x15  -neck flexion x15   -rotation x15   -rhythmic stabilization of the lateral cervical flexors     SIDELYING  -lateral head lift x15 "   -    PRONE  -  -  STANDING.  -  SITTING  -     THERAPEUTIC ACTIVITIES to improve functional performance for -  minutes, including:  -     MANUAL THERAPY TECHNIQUES  were applied to the cervical spine  for 15 minutes.  -cervical distraction  -cervical distraction with PAs of the cervical spine C3> C6  -SOR  -cephalocaudad oscillations   -lateral glides   -STM bilateral UT   -BLANCA first rib with ST mobs.     MODALITY      PATIENT EDUCATION AND HOME EXERCISES     Home Exercises Provided and Patient Education Provided     Education provided:   --Findings of evaluation and examination, and affect of these on plan for treatment  -Prognosis and expectations  -Home exercise program and expectations of therapy     Written Home Exercises Provided: no  ASSESSMENT     The patient performed all activities noted without limitation due to pain.  Mobility remains a significant limitation.       Gerda Is progressing towards her goals.   Pt prognosis is Fair.     Pt will continue to benefit from skilled outpatient physical therapy to address the deficits listed in the problem list box on initial evaluation, provide pt/family education and to maximize pt's level of independence in the home and community environment.     Pt's spiritual, cultural and educational needs considered and pt agreeable to plan of care and goals.     Anticipated barriers to physical therapy: none     Goals:   Short Term GOALS: 5 weeks. Pt agrees with goals set.  1. Pts pain intensity decreased 20-40% for improved quality of life and functional mobility ONGOING  2. Pt to report reduced tightness associated with neck movements for improved quality of movement ONGOING   3. Pt to demonstrate increase neck mobility in all motions with improved flexibility in the cervical and suprascapular musculature for improved neck movements. ONGOING  4. Pt to exhibit increased neck mobility in C!C2 rotation w/o end range pain for improved movement quality. ONGOING  5. Patient  demonstrates correct movement patterns associated with exercises for self management and independence with HEP ONGOING   6 Patient demonstrates independence with Postural Awareness and correction for self management ONGOING      Long Term GOALS: 10 weeks. Pt agrees with goals set.  1. Patient demonstrates increased scapulothoracic strength and endurance to improve tolerance to functional activities. ONGOING   2. Patient demonstrates increased cervical muscle strength for improved stability and tolerance to functional activities. ONGOING   3. Pt demonstrates neck movement patterns without end range pain for improved quality of daily activities.ONGOING  4 Pts pain level decreased to 75% or greater  for restoring functional mobility and ADL ONGOING  5. 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   Outpatient Physical Therapy 2 times weekly for 10 weeks to include the following interventions: Manual Therapy, Neuromuscular Re-ed, Patient Education, Therapeutic Activities, and Therapeutic Exercise.     Albert Mills, PT, DPT

## 2025-01-10 ENCOUNTER — OFFICE VISIT (OUTPATIENT)
Dept: PODIATRY | Facility: CLINIC | Age: OVER 89
End: 2025-01-10
Payer: MEDICARE

## 2025-01-10 VITALS
SYSTOLIC BLOOD PRESSURE: 117 MMHG | BODY MASS INDEX: 23.9 KG/M2 | DIASTOLIC BLOOD PRESSURE: 64 MMHG | HEART RATE: 66 BPM | WEIGHT: 129.88 LBS | HEIGHT: 62 IN

## 2025-01-10 DIAGNOSIS — M20.5X1 OVERLAPPING TOE, ACQUIRED, RIGHT: Chronic | ICD-10-CM

## 2025-01-10 DIAGNOSIS — L60.3 DYSTROPHIC NAIL: Primary | Chronic | ICD-10-CM

## 2025-01-10 DIAGNOSIS — M21.619 BUNION: Chronic | ICD-10-CM

## 2025-01-10 PROCEDURE — 99999 PR PBB SHADOW E&M-EST. PATIENT-LVL IV: CPT | Mod: PBBFAC,,, | Performed by: PODIATRIST

## 2025-01-10 PROCEDURE — 99214 OFFICE O/P EST MOD 30 MIN: CPT | Mod: PBBFAC,PN | Performed by: PODIATRIST

## 2025-01-10 NOTE — PROGRESS NOTES
"PODIATRY NOTE     PATIENT ID:  Gerda Lai is a 89 y.o. female.     CHIEF CONCERN:   Nail Problem (Dystrophic nail/PCP Tomas Beltran MD 10/09/24)           MEDICAL DECISION MAKING:        ICD-10-CM ICD-9-CM    1. Dystrophic nail, right big toenail  L60.3 703.8 Ambulatory referral/consult to Podiatry      2. Bunion  M21.619 727.1       3. Overlapping toe, acquired, right  M20.5X1 735.8           I counseled the patient on the patient's conditions, their implications and medical management.    Nail trimmed.  Trim at home or return as needed.   Bunion and overlapping toes.  Shoe and activity modification as needed for relief.   Wider at the toe box.  Extra depth.    She defers surgical intervention for now.   The deformities are more of an "inconvenience" than painful.    General nail care measures for abnormal nails include:  Keeping nails trimmed short, but avoid overzealous trimming  Avoiding trauma   Avoiding contact irritants   Keeping nails dry (avoiding wet work)  Avoiding all nail cosmetics  Wearing shoes that fit well at the toe box.  Avoid tight shoes.                 HISTORY OF PRESENT ILLNESS:  Gerda Lai is a 89 y.o. female with concerns regarding: Nail Problem (Dystrophic nail/PCP Tomas Beltran MD 10/09/24)  Has severe bunion and overlapping toes, chronic.  Did discuss surgical treatment options with Dr. Segundo.  She is not interested in surgery at this time.   She is trying shoe modifications ie. Skechers for relief.        Patient Active Problem List   Diagnosis    Personal history of breast cancer    Primary hypertension    HLD (hyperlipidemia)    CKD (chronic kidney disease), stage III    Hearing loss, sensorineural    Primary osteoarthritis of right knee    Cervical spondylosis    Personal history of skin cancer    Osteoarthritis    Lumbosacral radiculopathy at L5    History of total right hip replacement    Other spondylosis with radiculopathy, lumbar region    Aortic atherosclerosis    " Osteopenia    Subclavian artery stenosis, right    Abnormal aldosterone/renin ratio    Anxiety    Carotid arterial disease    Macrocytic anemia    Primary osteoarthritis of left knee s/p TKA 8/10/20    Impaired functional mobility, balance, gait, and endurance    Decreased ROM of neck    Risk for falls    Frail elderly    Impaired functional mobility, balance, and endurance    Right foot pain    Acute pain of right lower extremity    Weakness of both lower extremities    Lower extremity edema    Status post right knee replacement    Difficulty walking    Impaired range of motion of right hip    Weakness of both hips    Hamstring tightness of both lower extremities    Decreased back mobility    Impaired strength of neck muscles    Stiffness of neck    Segmental and somatic dysfunction of cervical region           Current Outpatient Medications on File Prior to Visit   Medication Sig Dispense Refill    acetaminophen (TYLENOL) 650 MG TbSR Take 1 tablet (650 mg total) by mouth every 8 (eight) hours. 120 tablet 0    aliskiren (TEKTURNA) 300 MG Tab TAKE 1 TABLET BY MOUTH EVERY DAY 90 tablet 3    amLODIPine (NORVASC) 5 MG tablet Take 1 tablet (5 mg total) by mouth once daily. 90 tablet 3    aspirin (ECOTRIN) 81 MG EC tablet TAKE 1 TABLET BY MOUTH EVERY DAY 90 tablet 3    b complex vitamins tablet Take 1 tablet by mouth once daily.      coenzyme Q10 100 mg capsule Take 100 mg by mouth once daily.      ezetimibe (ZETIA) 10 mg tablet TAKE 1 TABLET BY MOUTH EVERY DAY 90 tablet 3    glucosamine-chondroitin 500-400 mg tablet Take 1 tablet by mouth once daily.       hydrALAZINE (APRESOLINE) 25 MG tablet Take 1 tablet (25 mg total) by mouth 2 (two) times daily as needed (for systolic blood pressure >160). 30 tablet 3    labetaloL (NORMODYNE) 100 MG tablet TAKE ONE AND ONE-HALF TABLETS BY MOUTH EVERY 12 HOURS 270 tablet 2    LORazepam (ATIVAN) 0.5 MG tablet TAKE 1/2 TABLET TO 1 TABLET BY MOUTH EVERY 12 HOURS AS NEEDED FOR ANXIETY  "30 tablet 0    multivitamin capsule Take 1 capsule by mouth once daily.      omeprazole (PRILOSEC OTC) 20 MG tablet Take 20 mg by mouth once daily.      pravastatin (PRAVACHOL) 80 MG tablet TAKE 1 TABLET (80 MG TOTAL) BY MOUTH ONCE DAILY. 90 tablet 3    psyllium husk, aspartame, (METAMUCIL) 3.4 gram PwPk packet Take 1 packet by mouth once daily. 30 packet 1    TURMERIC ORAL Take 500 mg by mouth once daily.       vitamin D (VITAMIN D3) 1000 units Tab Take 1,000 Units by mouth once daily.       No current facility-administered medications on file prior to visit.           Review of patient's allergies indicates:   Allergen Reactions    Sulfa (sulfonamide antibiotics) Rash    Clarithromycin Other (See Comments)     Weak, extreme fatigue. dizziness    Flexeril [cyclobenzaprine] Other (See Comments)     Dizziness    Iodine and iodide containing products     Lisinopril Other (See Comments)     Cough and sensation of throat swelling/?angioedema    Losartan Rash    Metoprolol Swelling     Tightness in throat    Spironolactone      Throat tightening    Tramadol Other (See Comments)     Dizzy and weak    Verapamil (bulk) Palpitations    Voltaren [diclofenac sodium] Other (See Comments)     Drops blood pressure               ROS:   General ROS: negative for - chills, fever or night sweats  Respiratory ROS: no cough, shortness of breath, or wheezing  Cardiovascular ROS: no chest pain or dyspnea on exertion  Musculoskeletal ROS: negative for - muscle pain and muscular weakness  Neurological ROS: negative for - impaired coordination/balance and numbness/tingling  Dermatological ROS: negative for pruritus and rash      EXAM:     Vitals:    01/10/25 1145   BP: 117/64   Pulse: 66   Weight: 58.9 kg (129 lb 13.6 oz)   Height: 5' 2" (1.575 m)        General:  Alert and Oriented x 3;  No acute distress      Bilateral  Lower extremity exam:    Vascular:   Dorsalis Pedis:  present   Posterior Tibial:  present  Capillary refill time:  3 " seconds  Temperature of toes cool to touch  Edema:  Trace       Neurological:     Sharp touch:  normal  Light touch: normal  Tinels Sign:  Absent  Mulders Click:   Absent        Dermatological:   Skin: thin and atrophic  Wounds/Ulcers:  Absent  Bruising:  Absent  Erythema:  Absent  Elongated, thickened yellow toenail, right hallux, mycotic RIGHT hallux toenail loose from nail bed with subungual debris .   No paronychia.  No tenderness to palpation  nail bed intact without open woudns.     Musculoskeletal:   Metatarsophalangeal range of motion:   diminished range of motion  Subtalar joint range of motion: diminished range of motion  Ankle joint range of motion:  full range of motion  Bunions:  RIGHT hallux, large medial bony prominence   Hammertoes: abductovarus rotation of the right hallux,  2-5

## 2025-01-13 ENCOUNTER — CLINICAL SUPPORT (OUTPATIENT)
Dept: REHABILITATION | Facility: HOSPITAL | Age: OVER 89
End: 2025-01-13
Payer: MEDICARE

## 2025-01-13 DIAGNOSIS — M43.6 STIFFNESS OF NECK: Primary | ICD-10-CM

## 2025-01-13 DIAGNOSIS — M99.01 SEGMENTAL AND SOMATIC DYSFUNCTION OF CERVICAL REGION: ICD-10-CM

## 2025-01-13 PROCEDURE — 97110 THERAPEUTIC EXERCISES: CPT | Mod: PO | Performed by: PHYSICAL THERAPIST

## 2025-01-13 PROCEDURE — 97140 MANUAL THERAPY 1/> REGIONS: CPT | Mod: PO | Performed by: PHYSICAL THERAPIST

## 2025-01-13 PROCEDURE — 97112 NEUROMUSCULAR REEDUCATION: CPT | Mod: PO | Performed by: PHYSICAL THERAPIST

## 2025-01-13 NOTE — PROGRESS NOTES
"    [facility]   Physical Therapy Treatment Note     Name: Gerda Lai  Clinic Number: [id number]    Therapy Diagnosis:   Encounter Diagnoses   Name Primary?    Stiffness of neck Yes    Segmental and somatic dysfunction of cervical region          Physician: Andi Cisneros,*    Visit Date: 1/13/2025    Physician Orders: PT Eval and Treat neck   Medical Diagnosis from Referral:   Neck pain [M54.2]   Evaluation Date: 12/5/2024  Authorization Period Expiration: 11/25/2025  Plan of Care Expiration: 3/5/2025  Progress Note Due: 1/5/2025  Visit # / Visits authorized: 1/ 1, 25 / 26 = #4 neck      Foto  Date / Visit# Score    #1/3 12/5/2024-1 50%   #2/3       #3/3          PTA Visit #: 1/5     Precautions: Standard    Time In: 1503 pm  Time Out: 1610     pm  Total Billable Time: 67 minutes      SUBJECTIVE     Pt reports: the neck is hurting slightly.  The neck seems to better.  Movement feels better.  Less pain episodes.      She was not issued a home exercise program at   Response to previous treatment: did fine   Functional change: ongoing    Pain: 0/ 10  Location: left neck        OBJECTIVE           TREATMENT        Gerda received the treatments listed below:     .BOLD INDICATES ACTIVITIES PERFORMED / DISCUSSED     Leg press   Recumbent bike  Upright bike   UE ergometer  Treadmill   Elliptical          THERAPEUTIC EXERCISES to develop  strength, endurance, ROM, and flexibility for 15 minutes including   SUPINE  -rotation MWM x15   -neck extensor stretch x15 MWM  -UT stretch hold 10" x 6     SIDE LYING  -  PRONE  -on forearms for cervical rotation of C1C2 x15  -   STANDING.  -  SITTING  -     NEUROMUSCULAR RE-EDUCATION activities to improve: Balance, Proprioception, motor control and stability  for 20 minutes. The following activities were included:  SUPINE  -chin tucks x20  -nodding x15  -neck flexion x15   -rotation x15   -rhythmic stabilization of the lateral cervical flexors "     SIDELYING  -lateral head lift x15   -    PRONE  -  -  STANDING.  THERA BAND  RTB   -extension x20  -lo row x20     SITTING  -     THERAPEUTIC ACTIVITIES to improve functional performance for -  minutes, including:  -     MANUAL THERAPY TECHNIQUES  were applied to the cervical spine  for 10 minutes.  -cervical distraction  -cervical distraction with PAs of the cervical spine C3> C6  -SOR  -cephalocaudad oscillations   -lateral glides   -STM bilateral UT   -BLANCA first rib with ST mobs.     MODALITY      PATIENT EDUCATION AND HOME EXERCISES     Home Exercises Provided and Patient Education Provided     Education provided:   --Findings of evaluation and examination, and affect of these on plan for treatment  -Prognosis and expectations  -Home exercise program and expectations of therapy     Written Home Exercises Provided: no  ASSESSMENT     The patient performed the routine without limitation from pain. She is achieving improved mobility and less frequent episodes of pain.     Gerda Is progressing towards her goals.   Pt prognosis is Fair.     Pt will continue to benefit from skilled outpatient physical therapy to address the deficits listed in the problem list box on initial evaluation, provide pt/family education and to maximize pt's level of independence in the home and community environment.     Pt's spiritual, cultural and educational needs considered and pt agreeable to plan of care and goals.     Anticipated barriers to physical therapy: none     Goals:   Short Term GOALS: 5 weeks. Pt agrees with goals set.  1. Pts pain intensity decreased 20-40% for improved quality of life and functional mobility ONGOING  2. Pt to report reduced tightness associated with neck movements for improved quality of movement ONGOING   3. Pt to demonstrate increase neck mobility in all motions with improved flexibility in the cervical and suprascapular musculature for improved neck movements. ONGOING  4. Pt to exhibit increased  neck mobility in C!C2 rotation w/o end range pain for improved movement quality. ONGOING  5. Patient demonstrates correct movement patterns associated with exercises for self management and independence with HEP ONGOING   6 Patient demonstrates independence with Postural Awareness and correction for self management ONGOING      Long Term GOALS: 10 weeks. Pt agrees with goals set.  1. Patient demonstrates increased scapulothoracic strength and endurance to improve tolerance to functional activities. ONGOING   2. Patient demonstrates increased cervical muscle strength for improved stability and tolerance to functional activities. ONGOING   3. Pt demonstrates neck movement patterns without end range pain for improved quality of daily activities.ONGOING  4 Pts pain level decreased to 75% or greater  for restoring functional mobility and ADL ONGOING  5. 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   Outpatient Physical Therapy 2 times weekly for 10 weeks to include the following interventions: Manual Therapy, Neuromuscular Re-ed, Patient Education, Therapeutic Activities, and Therapeutic Exercise.     Albert Mills, PT, DPT

## 2025-01-15 ENCOUNTER — CLINICAL SUPPORT (OUTPATIENT)
Dept: REHABILITATION | Facility: HOSPITAL | Age: OVER 89
End: 2025-01-15
Payer: MEDICARE

## 2025-01-15 DIAGNOSIS — M99.01 SEGMENTAL AND SOMATIC DYSFUNCTION OF CERVICAL REGION: ICD-10-CM

## 2025-01-15 DIAGNOSIS — M43.6 STIFFNESS OF NECK: Primary | ICD-10-CM

## 2025-01-15 PROCEDURE — 97112 NEUROMUSCULAR REEDUCATION: CPT | Mod: PO | Performed by: PHYSICAL THERAPIST

## 2025-01-15 PROCEDURE — 97110 THERAPEUTIC EXERCISES: CPT | Mod: PO | Performed by: PHYSICAL THERAPIST

## 2025-01-15 PROCEDURE — 97140 MANUAL THERAPY 1/> REGIONS: CPT | Mod: PO | Performed by: PHYSICAL THERAPIST

## 2025-01-15 NOTE — PROGRESS NOTES
"    [facility]   Physical Therapy Treatment Note     Name: Gerda Lai  Clinic Number: [id number]    Therapy Diagnosis:   Encounter Diagnoses   Name Primary?    Stiffness of neck Yes    Segmental and somatic dysfunction of cervical region          Physician: Adni Cisneros,*    Visit Date: 1/15/2025    Physician Orders: PT Eval and Treat neck   Medical Diagnosis from Referral:   Neck pain [M54.2]   Evaluation Date: 12/5/2024  Authorization Period Expiration: 11/25/2025  Plan of Care Expiration: 3/5/2025  Progress Note Due: 1/5/2025  Visit # / Visits authorized: 1/ 1, 25 / 26 = #4 neck      Foto  Date / Visit# Score    #1/3 12/5/2024-1 50%   #2/3       #3/3          PTA Visit #: 1/5     Precautions: Standard    Time In: 1440 pm  Time Out:  1540    pm  Total Billable Time: 60 minutes      SUBJECTIVE     Pt reports: the shoulder on the right is hurting down the arm to the elbow. There is not any neck pain.   She was not issued a home exercise program at   Response to previous treatment: had some discomfort after the session but not bad,   Functional change: ongoing    Pain: 0 / 10  Location: left neck        OBJECTIVE           TREATMENT        Gerda received the treatments listed below:     .BOLD INDICATES ACTIVITIES PERFORMED / DISCUSSED     Leg press   Recumbent bike  Upright bike   UE ergometer  Treadmill   Elliptical          THERAPEUTIC EXERCISES to develop  strength, endurance, ROM, and flexibility for 15 minutes including   SUPINE  -rotation MWM x15   -neck extensor stretch x15 MWM  -UT stretch hold 10" x 6     SIDE LYING  -  PRONE  -on forearms for cervical rotation of C1C2 x15  -   STANDING.  -  SITTING  -     NEUROMUSCULAR RE-EDUCATION activities to improve: Balance, Proprioception, motor control and stability  for 25 minutes. The following activities were included:  SUPINE  -chin tucks x20  -nodding x15  -neck flexion x15   -rotation x15   -rhythmic stabilization of " the lateral cervical flexors     SIDELYING  -lateral head lift x15   -    PRONE  -  -  STANDING.  THERA BAND  RTB   -extension x20  -lo row x20     SITTING  -     THERAPEUTIC ACTIVITIES to improve functional performance for -  minutes, including:  -     MANUAL THERAPY TECHNIQUES  were applied to the cervical spine  for 10 minutes.  -cervical distraction  -cervical distraction with PAs of the cervical spine C3> C6  -SOR  -cephalocaudad oscillations   -lateral glides   -STM bilateral UT   -BLANCA first rib with ST mobs.     MODALITY      PATIENT EDUCATION AND HOME EXERCISES     Home Exercises Provided and Patient Education Provided     Education provided:   --Findings of evaluation and examination, and affect of these on plan for treatment  -Prognosis and expectations  -Home exercise program and expectations of therapy     Written Home Exercises Provided: no  ASSESSMENT     The patient completed the routine noted without difficulty. She continues to exhibit significant lateral flexion limitation.  Performs exercises with directives.      Gerda Is progressing towards her goals.   Pt prognosis is Fair.     Pt will continue to benefit from skilled outpatient physical therapy to address the deficits listed in the problem list box on initial evaluation, provide pt/family education and to maximize pt's level of independence in the home and community environment.     Pt's spiritual, cultural and educational needs considered and pt agreeable to plan of care and goals.     Anticipated barriers to physical therapy: none     Goals:   Short Term GOALS: 5 weeks. Pt agrees with goals set.  1. Pts pain intensity decreased 20-40% for improved quality of life and functional mobility ONGOING  2. Pt to report reduced tightness associated with neck movements for improved quality of movement ONGOING   3. Pt to demonstrate increase neck mobility in all motions with improved flexibility in the cervical and suprascapular musculature for  improved neck movements. ONGOING  4. Pt to exhibit increased neck mobility in C!C2 rotation w/o end range pain for improved movement quality. ONGOING  5. Patient demonstrates correct movement patterns associated with exercises for self management and independence with HEP ONGOING   6 Patient demonstrates independence with Postural Awareness and correction for self management ONGOING      Long Term GOALS: 10 weeks. Pt agrees with goals set.  1. Patient demonstrates increased scapulothoracic strength and endurance to improve tolerance to functional activities. ONGOING   2. Patient demonstrates increased cervical muscle strength for improved stability and tolerance to functional activities. ONGOING   3. Pt demonstrates neck movement patterns without end range pain for improved quality of daily activities.ONGOING  4 Pts pain level decreased to 75% or greater  for restoring functional mobility and ADL ONGOING  5. 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   Outpatient Physical Therapy 2 times weekly for 10 weeks to include the following interventions: Manual Therapy, Neuromuscular Re-ed, Patient Education, Therapeutic Activities, and Therapeutic Exercise.     Albert Mills, PT, DPT

## 2025-01-27 ENCOUNTER — CLINICAL SUPPORT (OUTPATIENT)
Dept: REHABILITATION | Facility: HOSPITAL | Age: OVER 89
End: 2025-01-27
Payer: MEDICARE

## 2025-01-27 DIAGNOSIS — M43.6 STIFFNESS OF NECK: Primary | ICD-10-CM

## 2025-01-27 DIAGNOSIS — M99.01 SEGMENTAL AND SOMATIC DYSFUNCTION OF CERVICAL REGION: ICD-10-CM

## 2025-01-27 PROCEDURE — 97110 THERAPEUTIC EXERCISES: CPT | Mod: PO | Performed by: PHYSICAL THERAPIST

## 2025-01-27 PROCEDURE — 97112 NEUROMUSCULAR REEDUCATION: CPT | Mod: PO | Performed by: PHYSICAL THERAPIST

## 2025-01-27 NOTE — PROGRESS NOTES
"    [facility]   Physical Therapy Treatment Note     Name: Gerda Lai  Clinic Number: [id number]    Therapy Diagnosis:   Encounter Diagnoses   Name Primary?    Stiffness of neck Yes    Segmental and somatic dysfunction of cervical region          Physician: Andi Cisneros,*    Visit Date: 1/27/2025    Physician Orders: PT Eval and Treat neck   Medical Diagnosis from Referral:   Neck pain [M54.2]   Evaluation Date: 12/5/2024  Authorization Period Expiration: 11/25/2025  Plan of Care Expiration: 3/5/2025  Progress Note Due: 1/5/2025  Visit # / Visits authorized: 1/ 1, 25 / 26 = #5 neck      Foto  Date / Visit# Score    #1/3 12/5/2024-1 50%   #2/3 1/29/2025-12      #3/3          PTA Visit #: 1/5     Precautions: Standard    Time In: 1407 pm  Time Out: 1507    pm  Total Billable Time: 60 minutes      SUBJECTIVE     Pt reports: the neck is fine and not a problem.   She was not issued a home exercise program at   Response to previous treatment: did fine   Functional change: ongoing    Pain: 0 / 10  Location: left neck        OBJECTIVE           TREATMENT        Gerda received the treatments listed below:     .BOLD INDICATES ACTIVITIES PERFORMED / DISCUSSED     Leg press   Recumbent bike  Upright bike   UE ergometer  Treadmill   Elliptical          THERAPEUTIC EXERCISES to develop  strength, endurance, ROM, and flexibility for 15 minutes including     SUPINE  -rotation MWM x15   -neck extensor stretch x15 MWM  -UT stretch hold 10" x 6     SIDE LYING  -  PRONE  -on forearms for cervical rotation of C1- C2 x15  -   STANDING.  -  SITTING  -     NEUROMUSCULAR RE-EDUCATION activities to improve: Balance, Proprioception, motor control and stability  for 25 minutes. The following activities were included:  SUPINE  -chin tucks    x20  -nodding        x15  -neck flexion x15   -rotation         x15   -rhythmic stabilization of the lateral cervical flexors     SIDELYING  -lateral head lift    " x15   -    PRONE  -  -  STANDING.  THERA BAND  RTB   -extension x20  -lo row x20     SITTING  -     THERAPEUTIC ACTIVITIES to improve functional performance for -  minutes, including:  -     MANUAL THERAPY TECHNIQUES  were applied to the cervical spine  for 0 minutes.  -cervical distraction  -cervical distraction with PAs of the cervical spine C3> C6  -SOR  -cephalocaudad oscillations   -lateral glides   -STM bilateral UT   -BLANCA first rib with ST mobs.     MODALITY      PATIENT EDUCATION AND HOME EXERCISES     Home Exercises Provided and Patient Education Provided     Education provided:   --Findings of evaluation and examination, and affect of these on plan for treatment  -Prognosis and expectations  -Home exercise program and expectations of therapy     Written Home Exercises Provided: no  ASSESSMENT     The patient performed the routine with direction to reproduce the exercises associated with her program. She continues to exhibit mobility restrictions but without pain. Right shoulder mobility dysfunction limits ST activities.   Gerda Is progressing towards her goals.   Pt prognosis is Fair.     Pt will continue to benefit from skilled outpatient physical therapy to address the deficits listed in the problem list box on initial evaluation, provide pt/family education and to maximize pt's level of independence in the home and community environment.     Pt's spiritual, cultural and educational needs considered and pt agreeable to plan of care and goals.     Anticipated barriers to physical therapy: none     Goals:   Short Term GOALS: 5 weeks. Pt agrees with goals set.  1. Pts pain intensity decreased 20-40% for improved quality of life and functional mobility ONGOING  2. Pt to report reduced tightness associated with neck movements for improved quality of movement ONGOING   3. Pt to demonstrate increase neck mobility in all motions with improved flexibility in the cervical and suprascapular musculature for  improved neck movements. ONGOING  4. Pt to exhibit increased neck mobility in C!C2 rotation w/o end range pain for improved movement quality. ONGOING  5. Patient demonstrates correct movement patterns associated with exercises for self management and independence with HEP ONGOING   6 Patient demonstrates independence with Postural Awareness and correction for self management ONGOING      Long Term GOALS: 10 weeks. Pt agrees with goals set.  1. Patient demonstrates increased scapulothoracic strength and endurance to improve tolerance to functional activities. ONGOING   2. Patient demonstrates increased cervical muscle strength for improved stability and tolerance to functional activities. ONGOING   3. Pt demonstrates neck movement patterns without end range pain for improved quality of daily activities.ONGOING  4 Pts pain level decreased to 75% or greater  for restoring functional mobility and ADL ONGOING  5. 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   Outpatient Physical Therapy 2 times weekly for 10 weeks to include the following interventions: Manual Therapy, Neuromuscular Re-ed, Patient Education, Therapeutic Activities, and Therapeutic Exercise.     Albert Mills, PT, DPT

## 2025-01-29 ENCOUNTER — CLINICAL SUPPORT (OUTPATIENT)
Dept: REHABILITATION | Facility: HOSPITAL | Age: OVER 89
End: 2025-01-29
Payer: MEDICARE

## 2025-01-29 DIAGNOSIS — M99.01 SEGMENTAL AND SOMATIC DYSFUNCTION OF CERVICAL REGION: ICD-10-CM

## 2025-01-29 DIAGNOSIS — M43.6 STIFFNESS OF NECK: Primary | ICD-10-CM

## 2025-01-29 PROCEDURE — 97112 NEUROMUSCULAR REEDUCATION: CPT | Mod: PO | Performed by: PHYSICAL THERAPIST

## 2025-01-29 NOTE — PROGRESS NOTES
"    [facility]   Physical Therapy Treatment Note     Name: Gerda Lai  Clinic Number: [id number]    Therapy Diagnosis:   Encounter Diagnoses   Name Primary?    Stiffness of neck Yes    Segmental and somatic dysfunction of cervical region          Physician: Andi Cisneros,*    Visit Date: 1/29/2025    Physician Orders: PT Eval and Treat neck   Medical Diagnosis from Referral:   Neck pain [M54.2]   Evaluation Date: 12/5/2024  Authorization Period Expiration: 11/25/2025  Plan of Care Expiration: 3/5/2025  Progress Note Due: 1/5/2025  Visit # / Visits authorized: 1/ 1, 25 / 26 = #6 neck      Foto  Date / Visit# Score    #1/3 12/5/2024-1 50%   #2/3 1/29/2025-12      #3/3          PTA Visit #: 1/5     Precautions: Standard    Time In: 1437  pm  Time Out:        pm  Total Billable Time:     minutes      SUBJECTIVE     Pt reports: the neck is feeling OK.     She was not issued a home exercise program at   Response to previous treatment: did fine   Functional change: ongoing    Pain: 0 / 10  Location: left neck        OBJECTIVE           TREATMENT        Gerda received the treatments listed below:     .BOLD INDICATES ACTIVITIES PERFORMED / DISCUSSED     Leg press   Recumbent bike  Upright bike   UE ergometer  Treadmill   Elliptical          THERAPEUTIC EXERCISES to develop  strength, endurance, ROM, and flexibility for 20 minutes including     SUPINE  -rotation MWM x15   -neck extensor stretch x15 MWM  -UT stretch hold 10" x 6     SIDE LYING  -  PRONE  -on forearms for cervical rotation of C1- C2 x15  -   STANDING.  -  SITTING  -     NEUROMUSCULAR RE-EDUCATION activities to improve: Balance, Proprioception, motor control and stability  for 25 minutes. The following activities were included:  SUPINE  -chin tucks    x20  -nodding        x15  -neck flexion x15   -rotation         x15   -rhythmic stabilization of the lateral cervical flexors     SIDELYING  -lateral head lift    x15 "   -    PRONE  -  -  STANDING.  THERA BAND  RTB   -extension x20  -lo row x20     SITTING  -     THERAPEUTIC ACTIVITIES to improve functional performance for -  minutes, including:  -     MANUAL THERAPY TECHNIQUES  were applied to the cervical spine  for 6 minutes.  -cervical distraction  -cervical distraction with PAs of the cervical spine C3> C6  -SOR  -cephalocaudad oscillations   -lateral glides   -STM bilateral UT   -BLANCA first rib with ST mobs.     MODALITY      PATIENT EDUCATION AND HOME EXERCISES     Home Exercises Provided and Patient Education Provided     Education provided:   --Findings of evaluation and examination, and affect of these on plan for treatment  -Prognosis and expectations  -Home exercise program and expectations of therapy     Written Home Exercises Provided: no  ASSESSMENT     The patient performed the routine without limitation due to neck pain. She is progressing well. Overall has responded favorably to the program. Will complete sessions and the DCJeremy Lorenz Is progressing towards her goals.   Pt prognosis is Fair.     Pt will continue to benefit from skilled outpatient physical therapy to address the deficits listed in the problem list box on initial evaluation, provide pt/family education and to maximize pt's level of independence in the home and community environment.     Pt's spiritual, cultural and educational needs considered and pt agreeable to plan of care and goals.     Anticipated barriers to physical therapy: none     Goals:   Short Term GOALS: 5 weeks. Pt agrees with goals set.  1. Pts pain intensity decreased 20-40% for improved quality of life and functional mobility ONGOING  2. Pt to report reduced tightness associated with neck movements for improved quality of movement ONGOING   3. Pt to demonstrate increase neck mobility in all motions with improved flexibility in the cervical and suprascapular musculature for improved neck movements. ONGOING  4. Pt to exhibit  increased neck mobility in C!C2 rotation w/o end range pain for improved movement quality. ONGOING  5. Patient demonstrates correct movement patterns associated with exercises for self management and independence with HEP ONGOING   6 Patient demonstrates independence with Postural Awareness and correction for self management ONGOING      Long Term GOALS: 10 weeks. Pt agrees with goals set.  1. Patient demonstrates increased scapulothoracic strength and endurance to improve tolerance to functional activities. ONGOING   2. Patient demonstrates increased cervical muscle strength for improved stability and tolerance to functional activities. ONGOING   3. Pt demonstrates neck movement patterns without end range pain for improved quality of daily activities.ONGOING  4 Pts pain level decreased to 75% or greater  for restoring functional mobility and ADL ONGOING  5. 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   Outpatient Physical Therapy 2 times weekly for 10 weeks to include the following interventions: Manual Therapy, Neuromuscular Re-ed, Patient Education, Therapeutic Activities, and Therapeutic Exercise.     Albert Mills, PT, DPT

## 2025-02-03 RX ORDER — AMLODIPINE BESYLATE 2.5 MG/1
2.5 TABLET ORAL 2 TIMES DAILY
COMMUNITY
End: 2025-02-03 | Stop reason: SDUPTHER

## 2025-02-03 RX ORDER — AMLODIPINE BESYLATE 2.5 MG/1
2.5 TABLET ORAL 2 TIMES DAILY
Qty: 180 TABLET | Refills: 3 | Status: SHIPPED | OUTPATIENT
Start: 2025-02-03

## 2025-02-04 ENCOUNTER — CLINICAL SUPPORT (OUTPATIENT)
Dept: REHABILITATION | Facility: HOSPITAL | Age: OVER 89
End: 2025-02-04
Payer: MEDICARE

## 2025-02-04 DIAGNOSIS — M99.01 SEGMENTAL AND SOMATIC DYSFUNCTION OF CERVICAL REGION: ICD-10-CM

## 2025-02-04 DIAGNOSIS — M43.6 STIFFNESS OF NECK: Primary | ICD-10-CM

## 2025-02-04 PROCEDURE — 97112 NEUROMUSCULAR REEDUCATION: CPT | Mod: PO | Performed by: PHYSICAL THERAPIST

## 2025-02-04 PROCEDURE — 97110 THERAPEUTIC EXERCISES: CPT | Mod: PO | Performed by: PHYSICAL THERAPIST

## 2025-02-04 PROCEDURE — 97140 MANUAL THERAPY 1/> REGIONS: CPT | Mod: PO | Performed by: PHYSICAL THERAPIST

## 2025-02-04 NOTE — PROGRESS NOTES
"    [facility]   Physical Therapy Treatment Note     Name: Gerda Lai  Clinic Number: [id number]    Therapy Diagnosis:   Encounter Diagnoses   Name Primary?    Stiffness of neck Yes    Segmental and somatic dysfunction of cervical region          Physician: Andi Cisneros,*    Visit Date: 2/4/2025    Physician Orders: PT Eval and Treat neck   Medical Diagnosis from Referral:   Neck pain [M54.2]   Evaluation Date: 12/5/2024  Authorization Period Expiration: 11/25/2025  Plan of Care Expiration: 3/5/2025  Progress Note Due: 1/5/2025  Visit # / Visits authorized: 1/ 1, 25 / 26 = #7 neck      Foto  Date / Visit# Score    #1/3 12/5/2024-1 50%   #2/3 1/29/2025-13      #3/3          PTA Visit #: 1/5     Precautions: Standard    Time In: 1510  pm  Time Out:   1615     pm  Total Billable Time:   65  minutes      SUBJECTIVE     Pt reports: the neck is feeling OK.     She was not issued a home exercise program at   Response to previous treatment: did fine   Functional change: ongoing    Pain: 0 / 10  Location: left neck        OBJECTIVE           TREATMENT        Gerda received the treatments listed below:     .BOLD INDICATES ACTIVITIES PERFORMED / DISCUSSED     Leg press   Recumbent bike  Upright bike   UE ergometer 6'  Treadmill   Elliptical          THERAPEUTIC EXERCISES to develop  strength, endurance, ROM, and flexibility for 15 minutes including     SUPINE  -rotation MWM x15   -neck extensor stretch x15 MWM  -UT stretch hold 10" x 6     SIDE LYING  -  PRONE  -on forearms for cervical rotation of C1- C2 x15  -   STANDING.  -  SITTING  -     NEUROMUSCULAR RE-EDUCATION activities to improve: Balance, Proprioception, motor control and stability  for 35 minutes. The following activities were included:  SUPINE  -chin tucks    x20  -nodding        x15  -neck flexion x15   -rotation         x15   -rhythmic stabilization of the lateral cervical flexors     SIDELYING  -lateral head lift    " x15   -    PRONE  -  -  STANDING.  THERA BAND  RTB   -extension x20  -lo row x20     SITTING  -     THERAPEUTIC ACTIVITIES to improve functional performance for  minutes, including:  -     MANUAL THERAPY TECHNIQUES  were applied to the cervical spine  for 12 minutes.  -cervical distraction  -cervical distraction with PAs of the cervical spine C3> C6  -SOR  -cephalocaudad oscillations   -lateral glides   -STM bilateral UT - sustained TrP pressure  -BLANCA first rib with ST mobs.     MODALITY      PATIENT EDUCATION AND HOME EXERCISES     Home Exercises Provided and Patient Education Provided     Education provided:   --Findings of evaluation and examination, and affect of these on plan for treatment  -Prognosis and expectations  -Home exercise program and expectations of therapy     Written Home Exercises Provided: no  ASSESSMENT     The patient performs the exercises without experiencing pain. She maintains cervical dysfunction movements in the OA low cervical flexion, low cervical rotation and extension mobility dysfunction. Good response to manual therapy interventions.     Gerda Is progressing towards her goals.   Pt prognosis is Fair.     Pt will continue to benefit from skilled outpatient physical therapy to address the deficits listed in the problem list box on initial evaluation, provide pt/family education and to maximize pt's level of independence in the home and community environment.     Pt's spiritual, cultural and educational needs considered and pt agreeable to plan of care and goals.     Anticipated barriers to physical therapy: none     Goals:   Short Term GOALS: 5 weeks. Pt agrees with goals set.  1. Pts pain intensity decreased 20-40% for improved quality of life and functional mobility ONGOING  2. Pt to report reduced tightness associated with neck movements for improved quality of movement ONGOING   3. Pt to demonstrate increase neck mobility in all motions with improved flexibility in the cervical  and suprascapular musculature for improved neck movements. ONGOING  4. Pt to exhibit increased neck mobility in C!C2 rotation w/o end range pain for improved movement quality. ONGOING  5. Patient demonstrates correct movement patterns associated with exercises for self management and independence with HEP ONGOING   6 Patient demonstrates independence with Postural Awareness and correction for self management ONGOING      Long Term GOALS: 10 weeks. Pt agrees with goals set.  1. Patient demonstrates increased scapulothoracic strength and endurance to improve tolerance to functional activities. ONGOING   2. Patient demonstrates increased cervical muscle strength for improved stability and tolerance to functional activities. ONGOING   3. Pt demonstrates neck movement patterns without end range pain for improved quality of daily activities.ONGOING  4 Pts pain level decreased to 75% or greater  for restoring functional mobility and ADL ONGOING  5. 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   Outpatient Physical Therapy 2 times weekly for 10 weeks to include the following interventions: Manual Therapy, Neuromuscular Re-ed, Patient Education, Therapeutic Activities, and Therapeutic Exercise.     Albert Mills, PT, DPT

## 2025-02-06 ENCOUNTER — CLINICAL SUPPORT (OUTPATIENT)
Dept: REHABILITATION | Facility: HOSPITAL | Age: OVER 89
End: 2025-02-06
Payer: MEDICARE

## 2025-02-06 DIAGNOSIS — M99.01 SEGMENTAL AND SOMATIC DYSFUNCTION OF CERVICAL REGION: ICD-10-CM

## 2025-02-06 DIAGNOSIS — M43.6 STIFFNESS OF NECK: Primary | ICD-10-CM

## 2025-02-06 PROCEDURE — 97112 NEUROMUSCULAR REEDUCATION: CPT | Mod: PO | Performed by: PHYSICAL THERAPIST

## 2025-02-06 NOTE — PROGRESS NOTES
"    [facility]   Physical Therapy Treatment Note     Name: Gerda Lai  Clinic Number: [id number]    Therapy Diagnosis:   Encounter Diagnoses   Name Primary?    Stiffness of neck Yes    Segmental and somatic dysfunction of cervical region          Physician: Andi Cisneros,*    Visit Date: 2/6/2025    Physician Orders: PT Eval and Treat neck   Medical Diagnosis from Referral:   Neck pain [M54.2]   Evaluation Date: 12/5/2024  Authorization Period Expiration: 11/25/2025  Plan of Care Expiration: 3/5/2025  Progress Note Due: 1/5/2025  Visit # / Visits authorized: 1/ 1, 25 / 26 = #7 neck      Foto  Date / Visit# Score    #1/3 12/5/2024-1 50%   #2/3 1/29/2025-13      #3/3          PTA Visit #: 1/5     Precautions: Standard    Time In: 1335  pm  Time Out: 1425    pm  Total Billable Time:  50   minutes      SUBJECTIVE     Pt reports: the neck is feeling OK.     She was not issued a home exercise program at   Response to previous treatment: did fine   Functional change: ongoing    Pain: 0 / 10  Location: left neck        OBJECTIVE           TREATMENT        Gerda received the treatments listed below:     .BOLD INDICATES ACTIVITIES PERFORMED / DISCUSSED     Leg press   Recumbent bike  Upright bike   UE ergometer 6'  Treadmill   Elliptical          THERAPEUTIC EXERCISES to develop  strength, endurance, ROM, and flexibility for 15 minutes including     SUPINE  -rotation MWM x15   -neck extensor stretch x15 MWM  -UT stretch hold 10" x 6     SIDE LYING  -  PRONE  -on forearms for cervical rotation of C1- C2 x15  -   STANDING.  -  SITTING  -     NEUROMUSCULAR RE-EDUCATION activities to improve: Balance, Proprioception, motor control and stability  for 45 minutes. The following activities were included:  SUPINE  -chin tucks    x20  -nodding        x15  -neck flexion x15   -rotation         x15   -rhythmic stabilization of the lateral cervical flexors     SIDELYING  -lateral head lift    x15 "   -    PRONE  -  -  STANDING.  THERA BAND  RTB   -extension x20  -lo row x20     SITTING  -     THERAPEUTIC ACTIVITIES to improve functional performance for  minutes, including:  -     MANUAL THERAPY TECHNIQUES  were applied to the cervical spine  for 0 minutes.  -cervical distraction  -cervical distraction with PAs of the cervical spine C3> C6  -SOR  -cephalocaudad oscillations   -lateral glides   -STM bilateral UT - sustained TrP pressure  -BLANCA first rib with ST mobs.     MODALITY      PATIENT EDUCATION AND HOME EXERCISES     Home Exercises Provided and Patient Education Provided     Education provided:   --Findings of evaluation and examination, and affect of these on plan for treatment  -Prognosis and expectations  -Home exercise program and expectations of therapy     Written Home Exercises Provided: no  ASSESSMENT     Ms. Lorenz is performing the exercises noted without pain. Mobility is limited and restricted by segmental hypomobility and tissue extensibility dysfunctions.  She can perform theraband activities with satisfactory movements.     Gerda Is progressing towards her goals.   Pt prognosis is Fair.     Pt will continue to benefit from skilled outpatient physical therapy to address the deficits listed in the problem list box on initial evaluation, provide pt/family education and to maximize pt's level of independence in the home and community environment.     Pt's spiritual, cultural and educational needs considered and pt agreeable to plan of care and goals.     Anticipated barriers to physical therapy: none     Goals:   Short Term GOALS: 5 weeks. Pt agrees with goals set.  1. Pts pain intensity decreased 20-40% for improved quality of life and functional mobility ONGOING  2. Pt to report reduced tightness associated with neck movements for improved quality of movement ONGOING   3. Pt to demonstrate increase neck mobility in all motions with improved flexibility in the cervical and suprascapular  musculature for improved neck movements. ONGOING  4. Pt to exhibit increased neck mobility in C!C2 rotation w/o end range pain for improved movement quality. ONGOING  5. Patient demonstrates correct movement patterns associated with exercises for self management and independence with HEP ONGOING   6 Patient demonstrates independence with Postural Awareness and correction for self management ONGOING      Long Term GOALS: 10 weeks. Pt agrees with goals set.  1. Patient demonstrates increased scapulothoracic strength and endurance to improve tolerance to functional activities. ONGOING   2. Patient demonstrates increased cervical muscle strength for improved stability and tolerance to functional activities. ONGOING   3. Pt demonstrates neck movement patterns without end range pain for improved quality of daily activities.ONGOING  4 Pts pain level decreased to 75% or greater  for restoring functional mobility and ADL ONGOING  5. 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   Outpatient Physical Therapy 2 times weekly for 10 weeks to include the following interventions: Manual Therapy, Neuromuscular Re-ed, Patient Education, Therapeutic Activities, and Therapeutic Exercise.     Albert Mills, PT, DPT

## 2025-02-11 ENCOUNTER — CLINICAL SUPPORT (OUTPATIENT)
Dept: REHABILITATION | Facility: HOSPITAL | Age: OVER 89
End: 2025-02-11
Payer: MEDICARE

## 2025-02-11 ENCOUNTER — TELEPHONE (OUTPATIENT)
Dept: PAIN MEDICINE | Facility: CLINIC | Age: OVER 89
End: 2025-02-11
Payer: MEDICARE

## 2025-02-11 DIAGNOSIS — M99.01 SEGMENTAL AND SOMATIC DYSFUNCTION OF CERVICAL REGION: ICD-10-CM

## 2025-02-11 DIAGNOSIS — M43.6 STIFFNESS OF NECK: Primary | ICD-10-CM

## 2025-02-11 PROCEDURE — 97140 MANUAL THERAPY 1/> REGIONS: CPT | Mod: PO | Performed by: PHYSICAL THERAPIST

## 2025-02-11 PROCEDURE — 97112 NEUROMUSCULAR REEDUCATION: CPT | Mod: PO | Performed by: PHYSICAL THERAPIST

## 2025-02-11 NOTE — PROGRESS NOTES
"    [facility]   Physical Therapy Treatment Note     Name: Gerda Lai  Clinic Number: [id number]    Therapy Diagnosis:   Encounter Diagnoses   Name Primary?    Stiffness of neck Yes    Segmental and somatic dysfunction of cervical region          Physician: Andi Cisneros,*    Visit Date: 2/11/2025    Physician Orders: PT Eval and Treat neck   Medical Diagnosis from Referral:   Neck pain [M54.2]   Evaluation Date: 12/5/2024  Authorization Period Expiration: 11/25/2025  Plan of Care Expiration: 3/5/2025  Progress Note Due: 1/5/2025  Visit # / Visits authorized: 1/ 1, 25 / 26 = #7 neck      Foto  Date / Visit# Score    #1/3 12/5/2024-1 50%   #2/3 1/29/2025-13      #3/3          PTA Visit #: 1/5     Precautions: Standard    Time In: 1305 pm  Time Out: 1400   pm  Total Billable Time:  55   minutes      SUBJECTIVE     Pt reports: the neck is feeling OK.  It is better. There is not as much pain.   There is some tightness but not exactly pain,.   She was not issued a home exercise program at [facility]. Not doing any exercises at home.   Response to previous treatment: did fine   Functional change: ongoing    Pain: 0 / 10  Location: left neck        OBJECTIVE           TREATMENT        Gerda received the treatments listed below:     .BOLD INDICATES ACTIVITIES PERFORMED / DISCUSSED     Leg press   Recumbent bike  Upright bike   UE ergometer 6'  Treadmill   Elliptical          THERAPEUTIC EXERCISES to develop  strength, endurance, ROM, and flexibility for 15 minutes including     SUPINE  -rotation MWM x15   -neck extensor stretch x15 MWM  -UT stretch hold 10" x 6     SIDE LYING  -  PRONE  -on forearms for cervical rotation of C1- C2 x15  -   STANDING.  -  SITTING  -     NEUROMUSCULAR RE-EDUCATION activities to improve: Balance, Proprioception, motor control and stability  for 45 minutes. The following activities were included:  SUPINE  -chin tucks    x20  -nodding        x15  -neck flexion x15 "   -rotation         x15   -rhythmic stabilization of the lateral cervical flexors     SIDELYING  -lateral head lift    x15   -CT stabilized rotation x15    PRONE  -  -  STANDING.  THERA BAND  RTB   -extension x20  -lo row x20     SITTING  -     THERAPEUTIC ACTIVITIES to improve functional performance for 10 minutes, including:  -     MANUAL THERAPY TECHNIQUES  were applied to the cervical spine  for 0 minutes.  -cervical distraction  -cervical distraction with PAs of the cervical spine C3> C6  -SOR  -cephalocaudad oscillations   -lateral glides   -STM bilateral UT - sustained TrP pressure  -BLANCA first rib with ST mobs.     MODALITY      PATIENT EDUCATION AND HOME EXERCISES     Home Exercises Provided and Patient Education Provided     Education provided:   --Findings of evaluation and examination, and affect of these on plan for treatment  -Prognosis and expectations  -Home exercise program and expectations of therapy     Written Home Exercises Provided: no  ASSESSMENT     Gerda completed the routine without pain being encountered. She continues to exhibit limited neck mobility especially with side bending.         Gerda Is progressing towards her goals.   Pt prognosis is Fair.     Pt will continue to benefit from skilled outpatient physical therapy to address the deficits listed in the problem list box on initial evaluation, provide pt/family education and to maximize pt's level of independence in the home and community environment.     Pt's spiritual, cultural and educational needs considered and pt agreeable to plan of care and goals.     Anticipated barriers to physical therapy: none     Goals:   Short Term GOALS: 5 weeks. Pt agrees with goals set.  1. Pts pain intensity decreased 20-40% for improved quality of life and functional mobility ONGOING  2. Pt to report reduced tightness associated with neck movements for improved quality of movement ONGOING   3. Pt to demonstrate increase neck mobility in all  motions with improved flexibility in the cervical and suprascapular musculature for improved neck movements. ONGOING  4. Pt to exhibit increased neck mobility in C!C2 rotation w/o end range pain for improved movement quality. ONGOING  5. Patient demonstrates correct movement patterns associated with exercises for self management and independence with HEP ONGOING   6 Patient demonstrates independence with Postural Awareness and correction for self management ONGOING      Long Term GOALS: 10 weeks. Pt agrees with goals set.  1. Patient demonstrates increased scapulothoracic strength and endurance to improve tolerance to functional activities. ONGOING   2. Patient demonstrates increased cervical muscle strength for improved stability and tolerance to functional activities. ONGOING   3. Pt demonstrates neck movement patterns without end range pain for improved quality of daily activities.ONGOING  4 Pts pain level decreased to 75% or greater  for restoring functional mobility and ADL ONGOING  5. 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   Outpatient Physical Therapy 2 times weekly for 10 weeks to include the following interventions: Manual Therapy, Neuromuscular Re-ed, Patient Education, Therapeutic Activities, and Therapeutic Exercise.     Albert Mills, PT, DPT

## 2025-02-11 NOTE — TELEPHONE ENCOUNTER
----- Message from [place] sent at 2/11/2025  3:51 PM CST -----  Regarding: appt Access  Contact: [phone]  Patient calling to verify if she will be getting epidural injection Thursday and does she need a ride home. Pls call [phone]

## 2025-02-13 ENCOUNTER — CLINICAL SUPPORT (OUTPATIENT)
Dept: REHABILITATION | Facility: HOSPITAL | Age: OVER 89
End: 2025-02-13
Payer: MEDICARE

## 2025-02-13 ENCOUNTER — OFFICE VISIT (OUTPATIENT)
Dept: PAIN MEDICINE | Facility: CLINIC | Age: OVER 89
End: 2025-02-13
Payer: MEDICARE

## 2025-02-13 VITALS
WEIGHT: 133.19 LBS | DIASTOLIC BLOOD PRESSURE: 78 MMHG | HEART RATE: 57 BPM | BODY MASS INDEX: 24.51 KG/M2 | SYSTOLIC BLOOD PRESSURE: 161 MMHG | HEIGHT: 62 IN

## 2025-02-13 DIAGNOSIS — M43.6 STIFFNESS OF NECK: Primary | ICD-10-CM

## 2025-02-13 DIAGNOSIS — G57.01 PIRIFORMIS SYNDROME OF RIGHT SIDE: ICD-10-CM

## 2025-02-13 DIAGNOSIS — M67.951 TENDINOPATHY OF RIGHT GLUTEAL REGION: Primary | ICD-10-CM

## 2025-02-13 DIAGNOSIS — M25.551 RIGHT HIP PAIN: ICD-10-CM

## 2025-02-13 DIAGNOSIS — M99.01 SEGMENTAL AND SOMATIC DYSFUNCTION OF CERVICAL REGION: ICD-10-CM

## 2025-02-13 DIAGNOSIS — M54.16 LUMBAR RADICULOPATHY: ICD-10-CM

## 2025-02-13 DIAGNOSIS — M47.816 LUMBAR SPONDYLOSIS: ICD-10-CM

## 2025-02-13 PROCEDURE — 99214 OFFICE O/P EST MOD 30 MIN: CPT | Mod: PBBFAC | Performed by: STUDENT IN AN ORGANIZED HEALTH CARE EDUCATION/TRAINING PROGRAM

## 2025-02-13 PROCEDURE — 97112 NEUROMUSCULAR REEDUCATION: CPT | Mod: PO | Performed by: PHYSICAL THERAPIST

## 2025-02-13 PROCEDURE — 97140 MANUAL THERAPY 1/> REGIONS: CPT | Mod: PO | Performed by: PHYSICAL THERAPIST

## 2025-02-13 PROCEDURE — 99999 PR PBB SHADOW E&M-EST. PATIENT-LVL IV: CPT | Mod: PBBFAC,,, | Performed by: STUDENT IN AN ORGANIZED HEALTH CARE EDUCATION/TRAINING PROGRAM

## 2025-02-13 NOTE — PROGRESS NOTES
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 physiologic and symmetric.      NEUROLOGICAL EXAM:  MENTAL STATUS: A x O x 3, good concentration, speech is fluent and goal directed  MEMORY: recent and remote are intact  CN: CN2-12 grossly intact  MOTOR: 5/5 in all muscle groups. Right shoulder abduction 2/5, shoulder flexion 1/5.   DTRs: 2+ intact symmetric  Sensation:    -no Loss of sensation in a left upper and right upper C-4, C-5, C-6, C-7, and C-8 bilaterally distribution. (+) loss of sensation in left L5 distribution  Hicks: absent on the bilateral side(s)  Clonus: absent on the bilateral side(s)    GAIT: unsteady.

## 2025-02-13 NOTE — PROGRESS NOTES
"    [facility]   Physical Therapy Treatment Note     Name: Gerda Lai  Clinic Number: [id number]    Therapy Diagnosis:   Encounter Diagnoses   Name Primary?    Stiffness of neck Yes    Segmental and somatic dysfunction of cervical region          Physician: Andi Cisneros,*    Visit Date: 2/13/2025    Physician Orders: PT Eval and Treat neck   Medical Diagnosis from Referral:   Neck pain [M54.2]   Evaluation Date: 12/5/2024  Authorization Period Expiration: 11/25/2025  Plan of Care Expiration: 3/5/2025  Progress Note Due: 1/5/2025  Visit # / Visits authorized: 1/ 1, 25 / 26 = #10 / 12  neck      Foto  Date / Visit# Score    #1/3 12/5/2024-1 50%   #2/3 1/29/2025-13      #3/3          PTA Visit #: 1/5     Precautions: Standard    Time In: 1235 pm  Time Out: 1330   pm  Total Billable Time:  55   minutes      SUBJECTIVE     Pt reports: the neck is feeling fine and there is not any pain. M<oswt of what     She was not issued a home exercise program at [facility]. Not doing any exercises at home.   Response to previous treatment: did fine   Functional change: ongoing    Pain: 0 / 10  Location: left neck        OBJECTIVE           TREATMENT        Gerda received the treatments listed below:     .BOLD INDICATES ACTIVITIES PERFORMED / DISCUSSED     Leg press   Recumbent bike 8'  Upright bike   UE ergometer 6'  Treadmill   Elliptical          THERAPEUTIC EXERCISES to develop  strength, endurance, ROM, and flexibility for 15 minutes including     SUPINE  -rotation MWM x15   -neck extensor stretch x15 MWM  -UT stretch hold 10" x 6     SIDE LYING  -  PRONE  -on forearms for cervical rotation of C1- C2 x15  -   STANDING.  -  SITTING  -     NEUROMUSCULAR RE-EDUCATION activities to improve: Balance, Proprioception, motor control and stability  for 35 minutes. The following activities were included:  SUPINE  -chin tucks    x20  -nodding        x15  -neck flexion x15   -rotation         x15   -rhythmic " stabilization of the lateral cervical flexors     SIDELYING  -lateral head lift    x15   -CT stabilized rotation x15    PRONE  -  -  STANDING.  THERA BAND  RTB   -extension x20  -lo row x20     SITTING  -     THERAPEUTIC ACTIVITIES to improve functional performance for 10 minutes, including:  -     MANUAL THERAPY TECHNIQUES  were applied to the cervical spine  for 8 minutes.  -cervical distraction  -cervical distraction with PAs of the cervical spine C3> C6  -SOR  -cephalocaudad oscillations   -BLANCA right lat flexors / UT   -lateral glides   -STM bilateral UT - sustained TrP pressure  -BLANCA first rib with ST mobs.     MODALITY      PATIENT EDUCATION AND HOME EXERCISES     Home Exercises Provided and Patient Education Provided     Education provided:   --Findings of evaluation and examination, and affect of these on plan for treatment  -Prognosis and expectations  -Home exercise program and expectations of therapy     Written Home Exercises Provided: no  ASSESSMENT     The patient performed the routine without any significant limitation from pain. Mobility remains segmental restricted along with associated tissue extensibility limitations. Good response to manual interventions.     Gerda Is progressing towards her goals.   Pt prognosis is Fair.     Pt will continue to benefit from skilled outpatient physical therapy to address the deficits listed in the problem list box on initial evaluation, provide pt/family education and to maximize pt's level of independence in the home and community environment.     Pt's spiritual, cultural and educational needs considered and pt agreeable to plan of care and goals.     Anticipated barriers to physical therapy: none     Goals:   Short Term GOALS: 5 weeks. Pt agrees with goals set.  1. Pts pain intensity decreased 20-40% for improved quality of life and functional mobility ONGOING  2. Pt to report reduced tightness associated with neck movements for improved quality of movement  ONGOING   3. Pt to demonstrate increase neck mobility in all motions with improved flexibility in the cervical and suprascapular musculature for improved neck movements. ONGOING  4. Pt to exhibit increased neck mobility in C!C2 rotation w/o end range pain for improved movement quality. ONGOING  5. Patient demonstrates correct movement patterns associated with exercises for self management and independence with HEP ONGOING   6 Patient demonstrates independence with Postural Awareness and correction for self management ONGOING      Long Term GOALS: 10 weeks. Pt agrees with goals set.  1. Patient demonstrates increased scapulothoracic strength and endurance to improve tolerance to functional activities. ONGOING   2. Patient demonstrates increased cervical muscle strength for improved stability and tolerance to functional activities. ONGOING   3. Pt demonstrates neck movement patterns without end range pain for improved quality of daily activities.ONGOING  4 Pts pain level decreased to 75% or greater  for restoring functional mobility and ADL ONGOING  5. 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   Outpatient Physical Therapy 2 times weekly for 10 weeks to include the following interventions: Manual Therapy, Neuromuscular Re-ed, Patient Education, Therapeutic Activities, and Therapeutic Exercise.     Albert Mills, PT, DPT

## 2025-02-18 ENCOUNTER — CLINICAL SUPPORT (OUTPATIENT)
Dept: REHABILITATION | Facility: HOSPITAL | Age: OVER 89
End: 2025-02-18
Attending: PHYSICAL THERAPIST
Payer: MEDICARE

## 2025-02-18 DIAGNOSIS — M43.6 STIFFNESS OF NECK: Primary | ICD-10-CM

## 2025-02-18 DIAGNOSIS — I10 HYPERTENSION, ESSENTIAL: Chronic | ICD-10-CM

## 2025-02-18 DIAGNOSIS — M99.01 SEGMENTAL AND SOMATIC DYSFUNCTION OF CERVICAL REGION: ICD-10-CM

## 2025-02-18 PROCEDURE — 97112 NEUROMUSCULAR REEDUCATION: CPT | Mod: PO | Performed by: PHYSICAL THERAPIST

## 2025-02-18 RX ORDER — LORAZEPAM 0.5 MG/1
TABLET ORAL
Qty: 30 TABLET | Refills: 0 | Status: SHIPPED | OUTPATIENT
Start: 2025-02-18

## 2025-02-18 NOTE — PROGRESS NOTES
"    [facility]   Physical Therapy Treatment Note     Name: Gerda Lai  Clinic Number: [id number]    Therapy Diagnosis:   Encounter Diagnoses   Name Primary?    Stiffness of neck Yes    Segmental and somatic dysfunction of cervical region            Physician: Andi Cisneros,*    Visit Date: 2/18/2025    Physician Orders: PT Eval and Treat neck   Medical Diagnosis from Referral:   Neck pain [M54.2]   Evaluation Date: 12/5/2024  Authorization Period Expiration: 11/25/2025  Plan of Care Expiration: 3/5/2025  Progress Note Due: 1/5/2025  Visit # / Visits authorized: 1/ 1, 25 / 26 = #10 / 12  neck      Foto  Date / Visit# Score    #1/3 12/5/2024-1 50%   #2/3 1/29/2025-13      #3/3          PTA Visit #: 1/5     Precautions: Standard    Time In: 1305 pm  Time Out: 1410   pm  Total Billable Time:  65   minutes      SUBJECTIVE     Pt reports: the neck is doing fine. No significant pain.   She was not issued a home exercise program at [facility]. Not doing any exercises at home.   Response to previous treatment: did fine   Functional change: ongoing    Pain: 0 / 10  Location: left neck        OBJECTIVE           TREATMENT        Gerda received the treatments listed below:     .BOLD INDICATES ACTIVITIES PERFORMED / DISCUSSED     Leg press   Recumbent bike 8'  Upright bike   UE ergometer 6'  Treadmill   Elliptical          THERAPEUTIC EXERCISES to develop  strength, endurance, ROM, and flexibility for 15 minutes including     SUPINE  -rotation MWM x15   -neck extensor stretch x15 MWM  -UT stretch hold 10" x 6     SIDE LYING  -  PRONE  -on forearms for cervical rotation of C1- C2 x15  -   STANDING.  -  SITTING  -rotation MWM x15   -neck extensor stretch x15 MWM  -UT stretch hold 10" x 6      NEUROMUSCULAR RE-EDUCATION activities to improve: Balance, Proprioception, motor control and stability  for 35 minutes. The following activities were included:  SUPINE  -chin tucks    x20  -nodding        " x15  -neck flexion x15   -rotation         x15   -rhythmic stabilization of the lateral cervical flexors     SIDELYING  -lateral head lift    x15   -CT stabilized rotation x15    PRONE  -  -  STANDING.  THERA BAND  RTB   -extension x20  -lo row x20     SITTING  -neck flexion x15   -rotation         x15      THERAPEUTIC ACTIVITIES to improve functional performance for 10 minutes, including:  -     MANUAL THERAPY TECHNIQUES  were applied to the cervical spine  for 8 minutes.  -cervical distraction  -cervical distraction with PAs of the cervical spine C3> C6  -SOR  -cephalocaudad oscillations   -BLANCA right lat flexors / UT   -lateral glides   -STM bilateral UT - sustained TrP pressure  -BLANCA first rib with ST mobs.     MODALITY      PATIENT EDUCATION AND HOME EXERCISES     Home Exercises Provided and Patient Education Provided     Education provided:   --Findings of evaluation and examination, and affect of these on plan for treatment  -Prognosis and expectations  -Home exercise program and expectations of therapy     Written Home Exercises Provided: no  ASSESSMENT     The patient performed the activities without pain. She also performed stretched in sitting which was more practical for the patient to perform at home. Progress as tolerated. The RUE dysfunction inhibits the ability for ST strengthening.  Add cervical isometrics.     Gerda Is progressing towards her goals.   Pt prognosis is Fair.     Pt will continue to benefit from skilled outpatient physical therapy to address the deficits listed in the problem list box on initial evaluation, provide pt/family education and to maximize pt's level of independence in the home and community environment.     Pt's spiritual, cultural and educational needs considered and pt agreeable to plan of care and goals.     Anticipated barriers to physical therapy: none     Goals:   Short Term GOALS: 5 weeks. Pt agrees with goals set.  1. Pts pain intensity decreased 20-40% for  improved quality of life and functional mobility ONGOING  2. Pt to report reduced tightness associated with neck movements for improved quality of movement ONGOING   3. Pt to demonstrate increase neck mobility in all motions with improved flexibility in the cervical and suprascapular musculature for improved neck movements. ONGOING  4. Pt to exhibit increased neck mobility in C!C2 rotation w/o end range pain for improved movement quality. ONGOING  5. Patient demonstrates correct movement patterns associated with exercises for self management and independence with HEP ONGOING   6 Patient demonstrates independence with Postural Awareness and correction for self management ONGOING      Long Term GOALS: 10 weeks. Pt agrees with goals set.  1. Patient demonstrates increased scapulothoracic strength and endurance to improve tolerance to functional activities. ONGOING   2. Patient demonstrates increased cervical muscle strength for improved stability and tolerance to functional activities. ONGOING   3. Pt demonstrates neck movement patterns without end range pain for improved quality of daily activities.ONGOING  4 Pts pain level decreased to 75% or greater  for restoring functional mobility and ADL ONGOING  5. 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   Outpatient Physical Therapy 2 times weekly for 10 weeks to include the following interventions: Manual Therapy, Neuromuscular Re-ed, Patient Education, Therapeutic Activities, and Therapeutic Exercise.     Albert Mills, PT, DPT

## 2025-02-20 ENCOUNTER — CLINICAL SUPPORT (OUTPATIENT)
Dept: REHABILITATION | Facility: HOSPITAL | Age: OVER 89
End: 2025-02-20
Attending: PHYSICAL THERAPIST
Payer: MEDICARE

## 2025-02-20 DIAGNOSIS — M43.6 STIFFNESS OF NECK: Primary | ICD-10-CM

## 2025-02-20 DIAGNOSIS — M99.01 SEGMENTAL AND SOMATIC DYSFUNCTION OF CERVICAL REGION: ICD-10-CM

## 2025-02-20 PROCEDURE — 97110 THERAPEUTIC EXERCISES: CPT | Mod: PO | Performed by: PHYSICAL THERAPIST

## 2025-02-20 PROCEDURE — 97140 MANUAL THERAPY 1/> REGIONS: CPT | Mod: PO | Performed by: PHYSICAL THERAPIST

## 2025-02-20 PROCEDURE — 97112 NEUROMUSCULAR REEDUCATION: CPT | Mod: PO | Performed by: PHYSICAL THERAPIST

## 2025-02-20 NOTE — PROGRESS NOTES
"    [facility]   Physical Therapy Treatment Note     Name: Gerda Lai  Clinic Number: [id number]    Therapy Diagnosis:   Encounter Diagnoses   Name Primary?    Stiffness of neck Yes    Segmental and somatic dysfunction of cervical region              Physician: Andi Cisneros,*    Visit Date: 2/20/2025    Physician Orders: PT Eval and Treat neck   Medical Diagnosis from Referral:   Neck pain [M54.2]   Evaluation Date: 12/5/2024  Authorization Period Expiration: 11/25/2025  Plan of Care Expiration: 3/5/2025  Progress Note Due: 1/5/2025  Visit # / Visits authorized: 1/ 1, 25 / 26 = #10 / 12  neck      Foto  Date / Visit# Score    #1/3 12/5/2024-1 50%   #2/3 1/29/2025-13      #3/3 2/20/2025-18         PTA Visit #: 1/5     Precautions: Standard    Time In: 1445 pm    arrived 1440  Time Out: 43015  pm  Total Billable Time:  55   minutes      SUBJECTIVE     Pt reports: the neck is feeling stiff today. No pain just stiff.    She was not issued a home exercise program at [facility]. Not doing any exercises at home.   Response to previous treatment: did fine   Functional change: ongoing    Pain: 0 / 10  Location: left neck        OBJECTIVE           TREATMENT        Gerda received the treatments listed below:     .BOLD INDICATES ACTIVITIES PERFORMED / DISCUSSED     Leg press   Recumbent bike 8'  Upright bike   UE ergometer 6'  Treadmill   Elliptical          THERAPEUTIC EXERCISES to develop  strength, endurance, ROM, and flexibility for 15 minutes including     SUPINE  -rotation MWM x15   -neck extensor stretch x15 MWM  -UT stretch hold 10" x 6     SIDE LYING  -  PRONE  -on forearms for cervical rotation of C1- C2 x15  -   STANDING.  -  SITTING  -rotation MWM x15   -neck extensor stretch x15 MWM  -UT stretch hold 10" x 6      NEUROMUSCULAR RE-EDUCATION activities to improve: Balance, Proprioception, motor control and stability  for 35 minutes. The following activities were " included:  SUPINE  -chin tucks    x20  -nodding        x15  -neck flexion x15   -rotation         x15   -rhythmic stabilization of the lateral cervical flexors     SIDELYING  -lateral head lift    x15   -CT stabilized rotation x15    PRONE  -  -  STANDING.  THERA BAND  RTB   -extension x20  -lo row x20     SITTING  -neck flexion x15   -rotation         x15      THERAPEUTIC ACTIVITIES to improve functional performance for 10 minutes, including:  -     MANUAL THERAPY TECHNIQUES  were applied to the cervical spine  for 8 minutes.  -cervical distraction  -cervical distraction with PAs of the cervical spine C3> C6  -SOR  -cephalocaudad oscillations   -BLANCA right lat flexors / UT   -lateral glides   -STM bilateral UT - sustained TrP pressure  -BLANCA first rib with ST mobs.     MODALITY      PATIENT EDUCATION AND HOME EXERCISES     Home Exercises Provided and Patient Education Provided     Education provided:   --Findings of evaluation and examination, and affect of these on plan for treatment  -Prognosis and expectations  -Home exercise program and expectations of therapy     Written Home Exercises Provided: no  ASSESSMENT     The patient completed the routine noted with a positive result. She did not relate to pain and indicated the neck felt less tight and better at the end of the session. Good response to manual interventions.       Gerda Is progressing towards her goals.   Pt prognosis is Fair.     Pt will continue to benefit from skilled outpatient physical therapy to address the deficits listed in the problem list box on initial evaluation, provide pt/family education and to maximize pt's level of independence in the home and community environment.     Pt's spiritual, cultural and educational needs considered and pt agreeable to plan of care and goals.     Anticipated barriers to physical therapy: none     Goals:   Short Term GOALS: 5 weeks. Pt agrees with goals set.  1. Pts pain intensity decreased 20-40% for  improved quality of life and functional mobility ONGOING  2. Pt to report reduced tightness associated with neck movements for improved quality of movement ONGOING   3. Pt to demonstrate increase neck mobility in all motions with improved flexibility in the cervical and suprascapular musculature for improved neck movements. ONGOING  4. Pt to exhibit increased neck mobility in C!C2 rotation w/o end range pain for improved movement quality. ONGOING  5. Patient demonstrates correct movement patterns associated with exercises for self management and independence with HEP ONGOING   6 Patient demonstrates independence with Postural Awareness and correction for self management ONGOING      Long Term GOALS: 10 weeks. Pt agrees with goals set.  1. Patient demonstrates increased scapulothoracic strength and endurance to improve tolerance to functional activities. ONGOING   2. Patient demonstrates increased cervical muscle strength for improved stability and tolerance to functional activities. ONGOING   3. Pt demonstrates neck movement patterns without end range pain for improved quality of daily activities.ONGOING  4 Pts pain level decreased to 75% or greater  for restoring functional mobility and ADL ONGOING  5. 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   Outpatient Physical Therapy 2 times weekly for 10 weeks to include the following interventions: Manual Therapy, Neuromuscular Re-ed, Patient Education, Therapeutic Activities, and Therapeutic Exercise.     Albert Mills, PT, DPT

## 2025-02-24 DIAGNOSIS — Z00.00 ENCOUNTER FOR MEDICARE ANNUAL WELLNESS EXAM: ICD-10-CM

## 2025-02-26 ENCOUNTER — CLINICAL SUPPORT (OUTPATIENT)
Dept: REHABILITATION | Facility: HOSPITAL | Age: OVER 89
End: 2025-02-26
Payer: MEDICARE

## 2025-02-26 DIAGNOSIS — M43.6 STIFFNESS OF NECK: Primary | ICD-10-CM

## 2025-02-26 DIAGNOSIS — M99.01 SEGMENTAL AND SOMATIC DYSFUNCTION OF CERVICAL REGION: ICD-10-CM

## 2025-02-26 PROCEDURE — 97112 NEUROMUSCULAR REEDUCATION: CPT | Mod: PO | Performed by: PHYSICAL THERAPIST

## 2025-02-26 PROCEDURE — 97140 MANUAL THERAPY 1/> REGIONS: CPT | Mod: PO | Performed by: PHYSICAL THERAPIST

## 2025-02-26 PROCEDURE — 97110 THERAPEUTIC EXERCISES: CPT | Mod: PO | Performed by: PHYSICAL THERAPIST

## 2025-02-26 NOTE — PROGRESS NOTES
"    [facility]   Physical Therapy Treatment Note     Name: Gerda Lai  Clinic Number: [id number]    Therapy Diagnosis:   Encounter Diagnoses   Name Primary?    Stiffness of neck Yes    Segmental and somatic dysfunction of cervical region              Physician: Andi Cisneros,*    Visit Date: 2/26/2025    Physician Orders: PT Eval and Treat neck   Medical Diagnosis from Referral:   Neck pain [M54.2]   Evaluation Date: 12/5/2024  Authorization Period Expiration: 11/25/2025  Plan of Care Expiration: 3/5/2025  Progress Note Due: 1/5/2025  Visit # / Visits authorized: 1/ 1, 25 / 26 = #10 / 12  neck      Foto  Date / Visit# Score    #1/3 12/5/2024-1 50%   #2/3 1/29/2025-13      #3/3 2/20/2025-18         PTA Visit #: 1/5     Precautions: Standard    Time In: 1540 pm      Time Out: 1640  pm  Total Billable Time:  60   minutes      SUBJECTIVE     Pt reports: there is some pain but not bad.   She was not issued a home exercise program at [facility]. Not doing any exercises at home.   Response to previous treatment: did fine   Functional change: ongoing    Pain: 1-2 / 10  Location: left neck        OBJECTIVE           TREATMENT        Gerda received the treatments listed below:     .BOLD INDICATES ACTIVITIES PERFORMED / DISCUSSED     Leg press   Recumbent bike 8'  Upright bike   UE ergometer 6'  Treadmill   Elliptical   Nu step 7'        THERAPEUTIC EXERCISES to develop  strength, endurance, ROM, and flexibility for 15 minutes including     SUPINE  -rotation MWM x15   -neck extensor stretch x15 MWM  -UT stretch hold 10" x 6     SIDE LYING  -  PRONE  -on forearms for cervical rotation of C1- C2 x15  -   STANDING.  -  SITTING  -rotation MWM x15   -neck extensor stretch x15 MWM  -UT stretch hold 10" x 6      NEUROMUSCULAR RE-EDUCATION activities to improve: Balance, Proprioception, motor control and stability  for 30 minutes. The following activities were included:  SUPINE  -chin tucks    " x20  -nodding        x15  -neck flexion x15   -rotation         x15   -rhythmic stabilization of the lateral cervical flexors     SIDELYING  -lateral head lift    x15   -CT stabilized rotation x15    PRONE  -  -  STANDING.  THERA BAND  RTB   -extension x20  -lo row x20     SITTING  -neck flexion x15   -rotation         x15      THERAPEUTIC ACTIVITIES to improve functional performance for 10 minutes, including:  -     MANUAL THERAPY TECHNIQUES  were applied to the cervical spine  for 10 minutes.  -cervical distraction  -cervical distraction with PAs of the cervical spine C3> C6  -SOR  -cephalocaudad oscillations   -BLANCA right lat flexors / UT   -lateral glides   -STM bilateral UT - sustained TrP pressure  -BLANCA first rib with ST mobs.     MODALITY      PATIENT EDUCATION AND HOME EXERCISES     Home Exercises Provided and Patient Education Provided     Education provided:   --Findings of evaluation and examination, and affect of these on plan for treatment  -Prognosis and expectations  -Home exercise program and expectations of therapy     Written Home Exercises Provided: no  ASSESSMENT     The patient performed the routine with noted restrictions in neck movements and lack of use of the RUE. She does not encounter significant pain with the exercises. Progress to a HEP and move to DC.        Gerda Is progressing towards her goals.   Pt prognosis is Fair.     Pt will continue to benefit from skilled outpatient physical therapy to address the deficits listed in the problem list box on initial evaluation, provide pt/family education and to maximize pt's level of independence in the home and community environment.     Pt's spiritual, cultural and educational needs considered and pt agreeable to plan of care and goals.     Anticipated barriers to physical therapy: none     Goals:   Short Term GOALS: 5 weeks. Pt agrees with goals set.  1. Pts pain intensity decreased 20-40% for improved quality of life and functional mobility  ONGOING  2. Pt to report reduced tightness associated with neck movements for improved quality of movement ONGOING   3. Pt to demonstrate increase neck mobility in all motions with improved flexibility in the cervical and suprascapular musculature for improved neck movements. ONGOING  4. Pt to exhibit increased neck mobility in C!C2 rotation w/o end range pain for improved movement quality. ONGOING  5. Patient demonstrates correct movement patterns associated with exercises for self management and independence with HEP ONGOING   6 Patient demonstrates independence with Postural Awareness and correction for self management ONGOING      Long Term GOALS: 10 weeks. Pt agrees with goals set.  1. Patient demonstrates increased scapulothoracic strength and endurance to improve tolerance to functional activities. ONGOING   2. Patient demonstrates increased cervical muscle strength for improved stability and tolerance to functional activities. ONGOING   3. Pt demonstrates neck movement patterns without end range pain for improved quality of daily activities.ONGOING  4 Pts pain level decreased to 75% or greater  for restoring functional mobility and ADL ONGOING  5. 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   Outpatient Physical Therapy 2 times weekly for 10 weeks to include the following interventions: Manual Therapy, Neuromuscular Re-ed, Patient Education, Therapeutic Activities, and Therapeutic Exercise.     Albert Mills, PT, DPT

## 2025-03-10 ENCOUNTER — OFFICE VISIT (OUTPATIENT)
Dept: URGENT CARE | Facility: CLINIC | Age: OVER 89
End: 2025-03-10
Payer: MEDICARE

## 2025-03-10 VITALS
WEIGHT: 133 LBS | HEART RATE: 68 BPM | TEMPERATURE: 99 F | SYSTOLIC BLOOD PRESSURE: 103 MMHG | OXYGEN SATURATION: 98 % | BODY MASS INDEX: 24.33 KG/M2 | RESPIRATION RATE: 18 BRPM | DIASTOLIC BLOOD PRESSURE: 60 MMHG

## 2025-03-10 DIAGNOSIS — R05.9 COUGH, UNSPECIFIED TYPE: Primary | ICD-10-CM

## 2025-03-10 DIAGNOSIS — J18.9 PNEUMONIA OF RIGHT LOWER LOBE DUE TO INFECTIOUS ORGANISM: ICD-10-CM

## 2025-03-10 LAB
CTP QC/QA: YES
CTP QC/QA: YES
POC MOLECULAR INFLUENZA A AGN: NEGATIVE
POC MOLECULAR INFLUENZA B AGN: NEGATIVE
SARS CORONAVIRUS 2 ANTIGEN: NEGATIVE

## 2025-03-10 PROCEDURE — 99214 OFFICE O/P EST MOD 30 MIN: CPT | Mod: S$GLB,,, | Performed by: SURGERY

## 2025-03-10 PROCEDURE — 87811 SARS-COV-2 COVID19 W/OPTIC: CPT | Mod: QW,S$GLB,, | Performed by: SURGERY

## 2025-03-10 PROCEDURE — 87502 INFLUENZA DNA AMP PROBE: CPT | Mod: QW,S$GLB,, | Performed by: SURGERY

## 2025-03-10 RX ORDER — DOXYCYCLINE 100 MG/1
100 CAPSULE ORAL 2 TIMES DAILY
Qty: 14 CAPSULE | Refills: 0 | Status: SHIPPED | OUTPATIENT
Start: 2025-03-10 | End: 2025-03-17

## 2025-03-10 RX ORDER — AMOXICILLIN AND CLAVULANATE POTASSIUM 875; 125 MG/1; MG/1
1 TABLET, FILM COATED ORAL EVERY 12 HOURS
Qty: 14 TABLET | Refills: 0 | Status: SHIPPED | OUTPATIENT
Start: 2025-03-10

## 2025-03-10 NOTE — PROGRESS NOTES
Subjective:      Patient ID: Gerda Lai is a 89 y.o. female.    Vitals:  weight is 60.3 kg (133 lb). Her oral temperature is 98.8 °F (37.1 °C). Her blood pressure is 103/60 and her pulse is 68. Her respiration is 18 and oxygen saturation is 98%.     Chief Complaint: Cough    This is a 89 y.o. female who presents today with a chief complaint of  cough, nasal congestion and phlegm that has started on Friday.    Cough  This is a new problem. The current episode started in the past 7 days. The problem has been waxing and waning. The problem occurs constantly. The cough is Productive of sputum. Associated symptoms include nasal congestion and postnasal drip. Pertinent negatives include no chest pain, chills, ear congestion, ear pain, fever, headaches, heartburn, hemoptysis, myalgias, rash, rhinorrhea, sore throat, shortness of breath, sweats, weight loss or wheezing. Nothing aggravates the symptoms. She has tried nothing for the symptoms. The treatment provided no relief. There is no history of asthma, bronchiectasis, bronchitis, COPD, emphysema, environmental allergies or pneumonia.       Constitution: Negative for chills and fever.   HENT:  Positive for postnasal drip. Negative for ear pain and sore throat.    Cardiovascular:  Negative for chest pain.   Respiratory:  Positive for cough. Negative for bloody sputum, shortness of breath and wheezing.    Gastrointestinal:  Negative for heartburn.   Musculoskeletal:  Negative for muscle ache.   Skin:  Negative for rash.   Allergic/Immunologic: Negative for environmental allergies.   Neurological:  Negative for headaches.      Objective:     Physical Exam   Constitutional: She is oriented to person, place, and time. She appears well-developed. She is cooperative.  Non-toxic appearance. She does not appear ill. No distress.   HENT:   Head: Normocephalic and atraumatic.   Ears:   Right Ear: Hearing, tympanic membrane, external ear and ear canal normal.   Left Ear:  Hearing, tympanic membrane, external ear and ear canal normal.   Nose: Nose normal. No mucosal edema, rhinorrhea or nasal deformity. No epistaxis. Right sinus exhibits no maxillary sinus tenderness and no frontal sinus tenderness. Left sinus exhibits no maxillary sinus tenderness and no frontal sinus tenderness.   Mouth/Throat: Uvula is midline, oropharynx is clear and moist and mucous membranes are normal. No trismus in the jaw. Normal dentition. No uvula swelling. No oropharyngeal exudate, posterior oropharyngeal edema or posterior oropharyngeal erythema.   Eyes: Conjunctivae and lids are normal. No scleral icterus.   Neck: Trachea normal and phonation normal. Neck supple. No edema present. No erythema present. No neck rigidity present.   Cardiovascular: Normal rate, regular rhythm, normal heart sounds and normal pulses.   Pulmonary/Chest: Effort normal. No respiratory distress. She has no decreased breath sounds. She has wheezes in the right lower field. She has rhonchi in the right lower field.   Abdominal: Normal appearance.   Musculoskeletal: Normal range of motion.         General: No deformity. Normal range of motion.   Neurological: She is alert and oriented to person, place, and time. She exhibits normal muscle tone. Coordination normal.   Skin: Skin is warm, dry, intact, not diaphoretic and not pale.   Psychiatric: Her speech is normal and behavior is normal. Judgment and thought content normal.   Nursing note and vitals reviewed.      Assessment:     1. Cough, unspecified type    2. Pneumonia of right lower lobe due to infectious organism        Plan:       Cough, unspecified type  -     POCT Influenza A/B MOLECULAR  -     SARS Coronavirus 2 Antigen, POCT Manual Read    Pneumonia of right lower lobe due to infectious organism  -     amoxicillin-clavulanate 875-125mg (AUGMENTIN) 875-125 mg per tablet; Take 1 tablet by mouth every 12 (twelve) hours.  Dispense: 14 tablet; Refill: 0  -     doxycycline  (VIBRAMYCIN) 100 MG Cap; Take 1 capsule (100 mg total) by mouth 2 (two) times daily. for 7 days  Dispense: 14 capsule; Refill: 0        Results for orders placed or performed in visit on 03/10/25   POCT Influenza A/B MOLECULAR    Collection Time: 03/10/25  5:30 PM   Result Value Ref Range    POC Molecular Influenza A Ag Negative Negative    POC Molecular Influenza B Ag Negative Negative     Acceptable Yes    SARS Coronavirus 2 Antigen, POCT Manual Read    Collection Time: 03/10/25  5:33 PM   Result Value Ref Range    SARS Coronavirus 2 Antigen Negative Negative, Presumptive Negative     Acceptable Yes      *Note: Due to a large number of results and/or encounters for the requested time period, some results have not been displayed. A complete set of results can be found in Results Review.

## 2025-03-24 ENCOUNTER — PATIENT MESSAGE (OUTPATIENT)
Dept: INTERNAL MEDICINE | Facility: CLINIC | Age: OVER 89
End: 2025-03-24
Payer: MEDICARE

## 2025-03-24 DIAGNOSIS — I10 HYPERTENSION, ESSENTIAL: Primary | ICD-10-CM

## 2025-03-24 NOTE — TELEPHONE ENCOUNTER
Sent pt a message in [employer] re: using [facility] to fill out questionnaire for [facility] [facility] center bus. Office will contact

## 2025-03-24 NOTE — TELEPHONE ENCOUNTER
Please have patient fill out questionnaire online, and the main [facility] [place] business office will contact them to provide more information and to set up the membership if they continue to be interested.

## 2025-04-21 DIAGNOSIS — I10 HYPERTENSION, ESSENTIAL: Chronic | ICD-10-CM

## 2025-04-21 RX ORDER — LORAZEPAM 0.5 MG/1
TABLET ORAL
Qty: 30 TABLET | Refills: 0 | Status: SHIPPED | OUTPATIENT
Start: 2025-04-21

## 2025-04-21 NOTE — TELEPHONE ENCOUNTER
No care due was identified.  [facility] Embedded Care Due Messages. Reference number: [id number].   4/21/2025 10:55:55 AM CDT

## 2025-05-01 ENCOUNTER — TELEPHONE (OUTPATIENT)
Dept: DERMATOLOGY | Facility: CLINIC | Age: OVER 89
End: 2025-05-01
Payer: MEDICARE

## 2025-05-01 NOTE — TELEPHONE ENCOUNTER
"----- Message from Julissa sent at 4/30/2025  2:14 PM CDT -----  Regarding: pt advice  Contact: [phone]  .Name Of Caller: Self Contact Preference?: [phone] What is the nature of the call?:any reference to nothing available, wanting to speak to office, psl call   Additional Notes:"Thank you for all that you do for our patients"  "

## 2025-05-05 ENCOUNTER — PATIENT MESSAGE (OUTPATIENT)
Dept: DERMATOLOGY | Facility: CLINIC | Age: OVER 89
End: 2025-05-05
Payer: MEDICARE

## 2025-05-06 RX ORDER — LABETALOL 100 MG/1
TABLET, FILM COATED ORAL
Qty: 270 TABLET | Refills: 1 | Status: SHIPPED | OUTPATIENT
Start: 2025-05-06

## 2025-05-06 NOTE — TELEPHONE ENCOUNTER
Refill Decision Note   Gerda Lai  is requesting a refill authorization.  Brief Assessment and Rationale for Refill:  Approve     Medication Therapy Plan:        Pharmacist review requested: Yes   Extended chart review required: Yes   Comments:     Note composed:4:25 PM 05/06/2025

## 2025-05-06 NOTE — TELEPHONE ENCOUNTER
No care due was identified.  [facility] Embedded Care Due Messages. Reference number: [id number].   5/06/2025 1:39:56 PM CDT

## 2025-05-06 NOTE — TELEPHONE ENCOUNTER
"Refill Routing Note   Medication(s) are not appropriate for processing by [facility] for the following reason(s):        Allergy or intolerance    [facility] action(s):  Defer        Medication Therapy Plan: "Tightness in throat"    Pharmacist review requested: Yes     Appointments  past 12m or future 3m with PCP    Date Provider   Last Visit   11/25/2024 Tomas Beltran MD   Next Visit   5/14/2025 Tomas Beltran MD   ED visits in past 90 days: 0        Note composed:4:10 PM 05/06/2025          "

## 2025-05-08 ENCOUNTER — HOSPITAL ENCOUNTER (EMERGENCY)
Facility: HOSPITAL | Age: OVER 89
Discharge: HOME | End: 2025-05-08
Attending: STUDENT IN AN ORGANIZED HEALTH CARE EDUCATION/TRAINING PROGRAM
Payer: MEDICARE

## 2025-05-08 VITALS
TEMPERATURE: 98 F | WEIGHT: 132.94 LBS | SYSTOLIC BLOOD PRESSURE: 183 MMHG | OXYGEN SATURATION: 93 % | HEART RATE: 58 BPM | DIASTOLIC BLOOD PRESSURE: 77 MMHG | RESPIRATION RATE: 20 BRPM | HEIGHT: 62 IN | BODY MASS INDEX: 24.46 KG/M2

## 2025-05-08 DIAGNOSIS — R29.6 FALL IN ELDERLY PATIENT: ICD-10-CM

## 2025-05-08 DIAGNOSIS — S32.10XA CLOSED FRACTURE OF SACRUM, UNSPECIFIED PORTION OF SACRUM, INITIAL ENCOUNTER: Primary | ICD-10-CM

## 2025-05-08 PROCEDURE — 25000003 PHARM REV CODE 250: Performed by: STUDENT IN AN ORGANIZED HEALTH CARE EDUCATION/TRAINING PROGRAM

## 2025-05-08 PROCEDURE — 99285 EMERGENCY DEPT VISIT HI MDM: CPT | Mod: 25

## 2025-05-08 RX ORDER — LABETALOL 100 MG/1
100 TABLET, FILM COATED ORAL
Status: COMPLETED | OUTPATIENT
Start: 2025-05-08 | End: 2025-05-08

## 2025-05-08 RX ORDER — ACETAMINOPHEN 500 MG
1000 TABLET ORAL
Status: COMPLETED | OUTPATIENT
Start: 2025-05-08 | End: 2025-05-08

## 2025-05-08 RX ORDER — LIDOCAINE 50 MG/G
1 PATCH TOPICAL
Status: DISCONTINUED | OUTPATIENT
Start: 2025-05-08 | End: 2025-05-09 | Stop reason: HOSPADM

## 2025-05-08 RX ADMIN — ACETAMINOPHEN 1000 MG: 500 TABLET ORAL at 08:05

## 2025-05-08 RX ADMIN — LIDOCAINE 1 PATCH: 50 PATCH CUTANEOUS at 05:05

## 2025-05-08 RX ADMIN — LABETALOL HYDROCHLORIDE 100 MG: 100 TABLET, FILM COATED ORAL at 09:05

## 2025-05-08 NOTE — ED PROVIDER NOTES
Encounter Date: 5/8/2025       History     Chief Complaint   Patient presents with    Fall     Reports falling yesterday, denies LOC, hit back of her head, not on blood thinners. C/o tailbone pain     89 y.o. female with anxiety, arthritis, thyroid disease, HLD, HTN presents for fall.  Patient reports yesterday she was walking to the car when she slipped and fell onto her buttocks.  She did fall back and strike her head lightly on the concrete.  She had difficulty getting up but was able to contact her neighbor immediately who helped her up.  She denies LOC, nausea/vomiting, numbness, weakness.  She denies any preceding symptoms or illness.  She denies any abdominal pain, neck pain, or chest pain/shortness of breath.  Today, she reports gradually worsening buttock pain.  She also reports a mild headache.    The history is provided by the patient and medical records.     Review of patient's allergies indicates:   Allergen Reactions    Sulfa (sulfonamide antibiotics) Rash    Clarithromycin Other (See Comments)     Weak, extreme fatigue. dizziness    Flexeril [cyclobenzaprine] Other (See Comments)     Dizziness    Iodine and iodide containing products     Lisinopril Other (See Comments)     Cough and sensation of throat swelling/?angioedema    Losartan Rash    Metoprolol Swelling     Tightness in throat    Spironolactone      Throat tightening    Tramadol Other (See Comments)     Dizzy and weak    Verapamil (bulk) Palpitations    Voltaren [diclofenac sodium] Other (See Comments)     Drops blood pressure     Past Medical History:   Diagnosis Date    Anxiety     Arthritis     Basal cell carcinoma 09/2016    right post auricular neck     Basal cell carcinoma of right forearm 04/19/2023    R lower forearm    Breast cancer 1992    right    Cataract     Fibromyalgia     History of measles as a child     Hyperlipidemia     Hypertension     Personal history of colonic polyps     Pneumonia     SCC (squamous cell carcinoma) 2015     R chest    SCC (squamous cell carcinoma) 2016    left medial shoulder    SCC (squamous cell carcinoma) 2017    left knee    SCC (squamous cell carcinoma) 2022    L upper chest, R upper arm KA types    Shingles     Squamous cell carcinoma 2015    right forearm    Thyroid disease     Vaginitis      Past Surgical History:   Procedure Laterality Date    ADENOIDECTOMY      ARTHROPLASTY, KNEE, TOTAL, USING COMPUTER-ASSISTED NAVIGATION Right 5/22/2023    Procedure: ARTHROPLASTY, KNEE, TOTAL, USING COMPUTER-ASSISTED NAVIGATION: QUYEN: RIGHT: MARTHA - TRIATHALON;  Surgeon: Milton Cobian III, MD;  Location: 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 OR;  Service: Orthopedics;  Laterality: Right;    CATARACT EXTRACTION W/  INTRAOCULAR LENS IMPLANT Bilateral     EPIDURAL STEROID INJECTION N/A 2/24/2023    Procedure: HEIDI C7-T1;  Surgeon: Andi Cisneros DO;  Location: [facility] OR;  Service: Pain Management;  Laterality: N/A;    EPIDURAL STEROID INJECTION INTO CERVICAL SPINE N/A 6/10/2024    Procedure: C7-T1 HEIDI (toward the left);  Surgeon: Andi Cisneros DO;  Location: [facility] PAIN MANAGEMENT;  Service: Pain Management;  Laterality: N/A;  RN sed w/Versed and Benadryl    EYE SURGERY      HAND SURGERY      INJECTION OF ANESTHETIC AGENT AROUND MEDIAL BRANCH NERVES INNERVATING CERVICAL FACET JOINT N/A 1/4/2022    Procedure: Cervical Medial Branch Block C5-6, C6-7 (No Sedation);  Surgeon: Himanshu Blackmon Jr., MD;  Location: [facility] PAIN T;  Service: Pain Management;  Laterality: N/A;    INJECTION OF ANESTHETIC AGENT AROUND MEDIAL BRANCH NERVES INNERVATING LUMBAR FACET JOINT Left 11/18/2021    Procedure: Cervical Medial Branch Block Left C5-6, C6-7 (IV Sedation);  Surgeon: Himanshu Blackmon Jr., MD;   Location: [facility] PAIN MGT;  Service: Pain Management;  Laterality: Left;    JOINT REPLACEMENT Right     ELZBIETA    KNEE ARTHROPLASTY Left 8/10/2020    Procedure: ARTHROPLASTY, KNEE-ADE NEXGEN/CEMENTED TIBIA;  Surgeon: Kalin Pacheco MD;  Location: [facility] OR;  Service: Orthopedics;  Laterality: Left;    RADIOFREQUENCY THERMAL COAGULATION OF MEDIAL BRANCH OF POSTERIOR RAMUS OF CERVICAL SPINAL NERVE Left 3/8/2022    Procedure: RADIOFREQUENCY THERMAL COAGULATION, NERVE, SPINAL, CERVICAL, LEFT C5-6 AND C6-7;  Surgeon: Himanshu Blackmon Jr., MD;  Location: [facility] PAIN MGT;  Service: Pain Management;  Laterality: Left;  NO PACEMAKER   ASA    thyriodectomy      partial    tonsillectomy      TONSILLECTOMY      TOTAL HIP ARTHROPLASTY      right    Yag  Left      Family History   Problem Relation Name Age of Onset    Breast cancer Mother      Cancer Mother      Stroke Paternal Grandfather      Heart disease Father      Cancer Paternal Aunt      Heart disease Paternal Aunt      Glaucoma Paternal Grandmother      Amblyopia Neg Hx      Blindness Neg Hx      Cataracts Neg Hx      Diabetes Neg Hx      Hypertension Neg Hx      Macular degeneration Neg Hx      Retinal detachment Neg Hx      Strabismus Neg Hx      Thyroid disease Neg Hx      Melanoma Neg Hx       Social History[1]  Review of Systems   Reason unable to perform ROS: See HPI for relevant ROS.       Physical Exam     Initial Vitals [05/08/25 1449]   BP Pulse Resp Temp SpO2   124/75 71 18 98.3 °F (36.8 °C) (!) 92 %      MAP       --         Physical Exam    Nursing note and vitals reviewed.  Constitutional:   Alert, normal work of breathing, no acute distress   Eyes: Conjunctivae and EOM are normal. Pupils are equal, round, and reactive to light. No scleral icterus.   Cardiovascular:  Normal rate, regular rhythm and intact distal pulses.           Pulmonary/Chest: Breath sounds normal. No stridor. No respiratory distress.   Abdominal: Abdomen is soft. She exhibits no  distension. There is no abdominal tenderness.   Musculoskeletal:      Comments: No tenderness, step-offs, deformities, or tenderness to the C, T, or L-spine  Tenderness of sacrum without open wounds  Normal range of motion of all extremities without pain  No bony tenderness or deformity of the trunk or extremities  No open wounds or discolorations     Neurological: She is alert and oriented to person, place, and time. She has normal strength. No sensory deficit.   Skin: Skin is warm and dry.         ED Course   Procedures  Labs Reviewed - No data to display         Imaging Results              CT Pelvis Without Contrast (Final result)  Result time 05/08/25 22:35:29      Final result by Amaury Montague MD (05/08/25 22:35:29)                   Impression:      Acute minimally displaced fracture at S3-S4.  No other acute fracture.    Electronically signed by resident: Mark Rodrigues  Date:    05/08/2025  Time:    22:01    Electronically signed by: Amaury Montague  Date:    05/08/2025  Time:    22:35               Narrative:    EXAMINATION:  CT PELVIS WITHOUT CONTRAST    CLINICAL HISTORY:  Pelvic fracture;    TECHNIQUE:  Low dose axial images, sagittal and coronal reformations were obtained from the iliac crests to the pubic symphysis without intravenous contrast.  Oral contrast was not administered.    COMPARISON:  Pelvic radiograph same day    FINDINGS:  BOWEL/MESENTERY: Sigmoid diverticulosis.  No evidence of bowel obstruction or inflammatory process. Normal appendix.    REPRODUCTIVE: Unremarkable.    URINARY BLADDER: Unremarkable.    VASCULATURE: Mild scattered atherosclerotic calcification.    BONES: Diffuse osteopenia.  Right hip arthroplasty.  Hardware is intact.  Degenerative change in the lower lumbar spine and left femoroacetabular joint.  Acute minimally displaced fracture at S3-S4. No other acute fracture.    EXTRAPERITONEAL SOFT TISSUES: Asymmetric atrophy of the right piriformis muscle, possibly  related to hip replacement.    OTHER: Trace free fluid in the pelvis.  No adenopathy.  No mass.                                        X-Ray Sacrum And Coccyx (Final result)  Result time 05/08/25 19:36:53      Final result by Amaury Montague MD (05/08/25 19:36:53)                   Impression:      S3-S4 fracture.  Consider CT for further evaluation as clinically warranted.    Otherwise negative pelvis.    This report was flagged in Epic as abnormal.      Electronically signed by: Amaury Montague  Date:    05/08/2025  Time:    19:36               Narrative:    EXAMINATION:  XR PELVIS ROUTINE AP; XR SACRUM AND COCCYX    CLINICAL HISTORY:  Fall;  Repeated falls    TECHNIQUE:  AP view of the pelvis was performed.  PA and lateral view of the sacrum and coccyx were also performed.    COMPARISON:  The fraction the    FINDINGS:  The pelvic ring appears intact.  Degenerative lumbar changes and mild degenerative left hip changes with prosthetic total right hip arthroplasty with no evidence of fracture or loosening.    There appears to be fracture at the S3-S4 level with mild malalignment.  The remainder of the sacrum appears intact.                                        X-Ray Pelvis Routine AP (Final result)  Result time 05/08/25 19:36:53      Final result by Amaury Montague MD (05/08/25 19:36:53)                   Impression:      S3-S4 fracture.  Consider CT for further evaluation as clinically warranted.    Otherwise negative pelvis.    This report was flagged in Epic as abnormal.      Electronically signed by: Amaury Montague  Date:    05/08/2025  Time:    19:36               Narrative:    EXAMINATION:  XR PELVIS ROUTINE AP; XR SACRUM AND COCCYX    CLINICAL HISTORY:  Fall;  Repeated falls    TECHNIQUE:  AP view of the pelvis was performed.  PA and lateral view of the sacrum and coccyx were also performed.    COMPARISON:  The fraction the    FINDINGS:  The pelvic ring appears intact.  Degenerative lumbar  changes and mild degenerative left hip changes with prosthetic total right hip arthroplasty with no evidence of fracture or loosening.    There appears to be fracture at the S3-S4 level with mild malalignment.  The remainder of the sacrum appears intact.                                       CT Cervical Spine Without Contrast (Final result)  Result time 05/08/25 18:14:16      Final result by Amaury Montague MD (05/08/25 18:14:16)                   Impression:      No acute fracture or traumatic malalignment identified.    Severe spondylosis and facet arthropathy with stenoses similar to prior MRI of 2023.      Electronically signed by: Amaury Montague  Date:    05/08/2025  Time:    18:14               Narrative:    EXAMINATION:  CT CERVICAL SPINE WITHOUT CONTRAST    CLINICAL HISTORY:  Neck trauma (Age >= 65y);    TECHNIQUE:  Noncontrast CT images of the cervical spine spine. Axial, coronal, and sagittal reformatted images were obtained.    COMPARISON:  Plain film, 03/19/2024, MRI cervical spine, 01/18/2023    FINDINGS:  Multilevel cervical spondylosis.  Disc space narrowing and osteophyte formation throughout most severe at C3-4 C4-5 and C5-6 with osteophytes causing central canal stenosis most severe at C4-5.  Bony foraminal encroachment due to hypertrophic uncinate process and facet joint changes rather severe on the left at C3-4, bilateral at C4-5 and on the right at C5-6.    No fracture or subluxation.  No surrounding soft tissue swelling or hematoma.  Cervical cranial and cervicothoracic junction maintained.                                       CT Head Without Contrast (Final result)  Result time 05/08/25 19:03:21      Final result by Sandip Magallon MD (05/08/25 19:03:21)                   Impression:      No acute intracranial pathology.    Age-related changes.    Electronically signed by resident: Tomas Calzada  Date:    05/08/2025  Time:    18:24    Electronically signed by: Sandip Magallon  MD  Date:    05/08/2025  Time:    19:03               Narrative:    EXAMINATION:  CT HEAD WITHOUT CONTRAST    CLINICAL HISTORY:  Head trauma, minor (Age >= 65y);    TECHNIQUE:  Low dose axial CT images obtained throughout the head without the use of intravenous contrast.  Axial, sagittal and coronal reconstructions were performed.    COMPARISON:  CT 08/19/2023    FINDINGS:  Generalized cerebral volume loss with compensatory widening of the ventricles and sulci.    No extra-axial blood or fluid collection.    The brain parenchyma otherwise appears unchanged.  Patchy hypoattenuation of the periventricular and subcortical white matter suggestive of microvascular ischemic changes.  No new hemorrhage, edema, or acute major vascular distribution infarct.    No mass effect or midline shift.    Ventricles are unchanged in size and configuration.  No hydrocephalus.  Basal cisterns are patent.    No displaced calvarial fracture.  No extracranial soft tissue thickening or underlying fluid collection.  Nodular thickening with calcification along the right temporal bone, slightly more conspicuous on today's exam when compared with CT 08/19/2023 a possible lymph node.    Mastoid air cells and paranasal sinuses are essentially clear.    Prominent skull base vascular calcifications.                                       Medications   acetaminophen tablet 1,000 mg (1,000 mg Oral Given 5/8/25 2038)   labetaloL tablet 100 mg (100 mg Oral Given 5/8/25 2154)     Medical Decision Making  89 y.o. female with anxiety, arthritis, thyroid disease, HLD, HTN presents for fall.   Differentials include coccyx fracture, sacral fracture, pelvic fracture, TBI  Patient presenting after mechanical slip and fall onto her buttocks and back of her head.  GCS 15, no focal neurological deficits, hemodynamically stable well-appearing.  She denies any preceding illness.  She does have marked tenderness of the coccyx with no open wounds or deformity.  No  external evidence of head injury, no vertebral tenderness  Patient with hypertension in the ED, asymptomatic. Given her evening BP meds  Patient care handed off to oncoming team pending CT and ortho recs    Amount and/or Complexity of Data Reviewed  External Data Reviewed: labs and notes.  Radiology: ordered. Decision-making details documented in ED Course.    Risk  OTC drugs.  Prescription drug management.               ED Course as of 05/09/25 0903   Thu May 08, 2025   1900 CT Cervical Spine Without Contrast  No acute fracture/dislocation, severe degenerative changes [OK]   2002 X-Ray Sacrum And Coccyx(!)  Findings, per independent interpretation:  Irregularity of the sacrum concerning for fracture, CT ordered [OK]   2002 Patient ambulatory around the department at baseline/with walker.  She does have increasing pain, requesting Tylenol which was given [OK]   2115 Discussed with orthopedics [OK]      ED Course User Index  [OK] Jean Claude Patel MD                           Clinical Impression:  Final diagnoses:  [R29.6] Fall in elderly patient  [S32.10XA] Closed fracture of sacrum, unspecified portion of sacrum, initial encounter (Primary)          ED Disposition Condition    Discharge Stable          ED Prescriptions    None       Follow-up Information       Follow up With Specialties Details Why Contact Info Additional Information    Tomas Beltran MD Internal Medicine Schedule an appointment as soon as possible for a visit   [address] [facility] [address]  [phone]       [facility]oped[facility] Schedule an appointment as soon as possible for a visit   [address] [facility]  [facility] [address] [phone] [facility], 5th Floor Please park in [address] and take [address] elevator                 [1]   Social History  Tobacco Use    Smoking status: Never     Passive exposure: Never    Smokeless tobacco: Never   Substance Use  Topics    Alcohol use: Yes     Comment: socially - celebrations only    Drug use: Never        Jean Claude Patel MD  05/09/25 0903

## 2025-05-08 NOTE — ED TRIAGE NOTES
Gerda Lai, a 89 y.o. female presents to the ED w/ complaint of fall. Pt had a fall yesterday during ambulation. Pt states she hit the back but denies any loss of consciousness. 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 Pt is ambulatory with little assistance through the use of her  walker.     Triage note:  Chief Complaint   Patient presents with    Fall     Reports falling yesterday, denies LOC, hit back of her head, not on blood thinners. C/o tailbone pain     Review of patient's allergies indicates:   Allergen Reactions    Sulfa (sulfonamide antibiotics) Rash    Clarithromycin Other (See Comments)     Weak, extreme fatigue. dizziness    Flexeril [cyclobenzaprine] Other (See Comments)     Dizziness    Iodine and iodide containing products     Lisinopril Other (See Comments)     Cough and sensation of throat swelling/?angioedema    Losartan Rash    Metoprolol Swelling     Tightness in throat    Spironolactone      Throat tightening    Tramadol Other (See Comments)     Dizzy and weak    Verapamil (bulk) Palpitations    Voltaren [diclofenac sodium] Other (See Comments)     Drops blood pressure     Past Medical History:   Diagnosis Date    Anxiety     Arthritis     Basal cell carcinoma 09/2016    right post auricular neck     Basal cell carcinoma of right forearm 04/19/2023    R lower forearm    Breast cancer 1992    right    Cataract     Fibromyalgia     History of measles as a child     Hyperlipidemia     Hypertension     Personal history of colonic polyps     Pneumonia     SCC (squamous cell carcinoma) 2015    R chest    SCC (squamous cell carcinoma) 2016    left medial shoulder    SCC (squamous cell carcinoma) 2017    left knee    SCC (squamous cell carcinoma) 2022    L upper chest, R upper arm KA types    Shingles     Squamous cell carcinoma 2015    right forearm    Thyroid disease     Vaginitis

## 2025-05-08 NOTE — ED NOTES
Bed: [facility]  Expected date:   Expected time:   Means of arrival:   Comments:

## 2025-05-08 NOTE — FIRST PROVIDER EVALUATION
Emergency Department TeleTriage Encounter Note      CHIEF COMPLAINT    Chief Complaint   Patient presents with    Fall     Reports falling yesterday, denies LOC, hit back of her head, not on blood thinners. C/o tailbone pain       VITAL SIGNS   Initial Vitals [05/08/25 1449]   BP Pulse Resp Temp SpO2   124/75 71 18 98.3 °F (36.8 °C) (!) 92 %      MAP       --            ALLERGIES    Review of patient's allergies indicates:   Allergen Reactions    Sulfa (sulfonamide antibiotics) Rash    Clarithromycin Other (See Comments)     Weak, extreme fatigue. dizziness    Flexeril [cyclobenzaprine] Other (See Comments)     Dizziness    Iodine and iodide containing products     Lisinopril Other (See Comments)     Cough and sensation of throat swelling/?angioedema    Losartan Rash    Metoprolol Swelling     Tightness in throat    Spironolactone      Throat tightening    Tramadol Other (See Comments)     Dizzy and weak    Verapamil (bulk) Palpitations    Voltaren [diclofenac sodium] Other (See Comments)     Drops blood pressure       PROVIDER TRIAGE NOTE  Verbal consent for the teletriage evaluation was given by the patient at the start of the evaluation.  All efforts will be made to maintain patient's privacy during the evaluation.      This is a teletriage evaluation of a 89 y.o. female presenting to the ED with c/o loss balance and fell; injury to buttocks/low back and head.  Denies LOC. Limited physical exam via telehealth: The patient is awake, alert, answering questions appropriately and is not in respiratory distress.  As the Teletriage provider, I performed an initial assessment and ordered appropriate labs and imaging studies, if any, to facilitate the patient's care once placed in the ED. Once a room is available, care and a full evaluation will be completed by an alternate ED provider.  Any additional orders and the final disposition will be determined by that provider.  All imaging and labs will not be followed-up by  "the Teletriage Team, including myself.      Will await in-person evaluation to determine if imaging is warranted    ORDERS  Labs Reviewed   HEPATITIS C ANTIBODY   HEP C VIRUS HOLD SPECIMEN   HIV 1 / 2 ANTIBODY       ED Orders (720h ago, onward)      Start Ordered     Status Ordering Provider    05/08/25 1452 05/08/25 1451  Hepatitis C Antibody  STAT        Placed in "And" Linked Group    Ordered JOCELYN MADSEN    05/08/25 1452 05/08/25 1451  HCV Virus Hold Specimen  STAT        Placed in "And" Linked Group    Ordered JOCELYN MADSEN    05/08/25 1452 05/08/25 1451  HIV 1/2 Ag/Ab (4th Gen)  STAT         Ordered JOCELYN MADSEN              Virtual Visit Note: The provider triage portion of this emergency department evaluation and documentation was performed via [employer] a HIPAA-compliant telemedicine application, in concert with a tele-presenter in the room. A face to face patient evaluation with one of my colleagues will occur once the patient is placed in an emergency department room.      DISCLAIMER: This note was prepared with [employer] voice recognition transcription software. Garbled syntax, mangled pronouns, and other bizarre constructions may be attributed to that software system.    "

## 2025-05-09 ENCOUNTER — TELEPHONE (OUTPATIENT)
Dept: ORTHOPEDICS | Facility: CLINIC | Age: OVER 89
End: 2025-05-09
Payer: MEDICARE

## 2025-05-09 PROBLEM — S32.10XA CLOSED FRACTURE OF SACRUM: Status: ACTIVE | Noted: 2025-05-09

## 2025-05-09 NOTE — DISCHARGE INSTRUCTIONS
Tests today showed:   Labs Reviewed - No data to display  CT Pelvis Without Contrast   Final Result      Acute minimally displaced fracture at S3-S4.  No other acute fracture.      Electronically signed by resident: Mark Rodrigues   Date:    05/08/2025   Time:    22:01      Electronically signed by: Amaury Montague   Date:    05/08/2025   Time:    22:35      X-Ray Sacrum And Coccyx   Final Result   Abnormal      S3-S4 fracture.  Consider CT for further evaluation as clinically warranted.      Otherwise negative pelvis.      This report was flagged in Epic as abnormal.         Electronically signed by: Amaury Montague   Date:    05/08/2025   Time:    19:36      X-Ray Pelvis Routine AP   Final Result   Abnormal      S3-S4 fracture.  Consider CT for further evaluation as clinically warranted.      Otherwise negative pelvis.      This report was flagged in Epic as abnormal.         Electronically signed by: Amaury Montague   Date:    05/08/2025   Time:    19:36      CT Cervical Spine Without Contrast   Final Result      No acute fracture or traumatic malalignment identified.      Severe spondylosis and facet arthropathy with stenoses similar to prior MRI of 2023.         Electronically signed by: Amaury Montague   Date:    05/08/2025   Time:    18:14      CT Head Without Contrast   Final Result      No acute intracranial pathology.      Age-related changes.      Electronically signed by resident: Tomas Calzada   Date:    05/08/2025   Time:    18:24      Electronically signed by: Sandip Magallon MD   Date:    05/08/2025   Time:    19:03          Treatments you had today:   Medications   LIDOcaine 5 % patch 1 patch (1 patch Transdermal Patch Applied 5/8/25 1750)   acetaminophen tablet 1,000 mg (1,000 mg Oral Given 5/8/25 2038)   labetaloL tablet 100 mg (100 mg Oral Given 5/8/25 2154)       Follow-Up Plan:  - Follow-up with primary care doctor within 3 - 5 days  - Additional testing and/or evaluation as directed by your primary  doctor    Return to the Emergency Department for symptoms including but not limited to: worsening symptoms, shortness of breath or chest pain, vomiting with inability to hold down fluids, fevers greater than 100.4°F, passing out/fainting/unconsciousness, or other concerning symptoms.

## 2025-05-09 NOTE — ED NOTES
Assumed care of patient at this time. Pt arrive to ED with a complaint of fall .Pt  lying in stretcher. Vital signs are stable, provided pt with warm blanket. Pt denied restroom use. No other complaints from pt at this time.     Review of patient's allergies indicates:   Allergen Reactions    Sulfa (sulfonamide antibiotics) Rash    Clarithromycin Other (See Comments)     Weak, extreme fatigue. dizziness    Flexeril [cyclobenzaprine] Other (See Comments)     Dizziness    Iodine and iodide containing products     Lisinopril Other (See Comments)     Cough and sensation of throat swelling/?angioedema    Losartan Rash    Metoprolol Swelling     Tightness in throat    Spironolactone      Throat tightening    Tramadol Other (See Comments)     Dizzy and weak    Verapamil (bulk) Palpitations    Voltaren [diclofenac sodium] Other (See Comments)     Drops blood pressure     Past Medical History:   Diagnosis Date    Anxiety     Arthritis     Basal cell carcinoma 09/2016    right post auricular neck     Basal cell carcinoma of right forearm 04/19/2023    R lower forearm    Breast cancer 1992    right    Cataract     Fibromyalgia     History of measles as a child     Hyperlipidemia     Hypertension     Personal history of colonic polyps     Pneumonia     SCC (squamous cell carcinoma) 2015    R chest    SCC (squamous cell carcinoma) 2016    left medial shoulder    SCC (squamous cell carcinoma) 2017    left knee    SCC (squamous cell carcinoma) 2022    L upper chest, R upper arm KA types    Shingles     Squamous cell carcinoma 2015    right forearm    Thyroid disease     Vaginitis

## 2025-05-09 NOTE — SUBJECTIVE & OBJECTIVE
Past Medical History:   Diagnosis Date    Anxiety     Arthritis     Basal cell carcinoma 09/2016    right post auricular neck     Basal cell carcinoma of right forearm 04/19/2023    R lower forearm    Breast cancer 1992    right    Cataract     Fibromyalgia     History of measles as a child     Hyperlipidemia     Hypertension     Personal history of colonic polyps     Pneumonia     SCC (squamous cell carcinoma) 2015    R chest    SCC (squamous cell carcinoma) 2016    left medial shoulder    SCC (squamous cell carcinoma) 2017    left knee    SCC (squamous cell carcinoma) 2022    L upper chest, R upper arm KA types    Shingles     Squamous cell carcinoma 2015    right forearm    Thyroid disease     Vaginitis        Past Surgical History:   Procedure Laterality Date    ADENOIDECTOMY      ARTHROPLASTY, KNEE, TOTAL, USING COMPUTER-ASSISTED NAVIGATION Right 5/22/2023    Procedure: ARTHROPLASTY, KNEE, TOTAL, USING COMPUTER-ASSISTED NAVIGATION: QUYEN: RIGHT: MARTHA - TRIATHALON;  Surgeon: Milton Cobian III, MD;  Location: [facility];  Service: Orthopedics;  Laterality: Right;    BREAST BIOPSY Left     Excisional bx, benign    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    CARPAL TUNNEL RELEASE Right 3/16/2021    Procedure: RELEASE, CARPAL TUNNEL;  Surgeon: Barbara Aldridge MD;  Location: [facility] OR;  Service: Orthopedics;  Laterality: Right;    CATARACT EXTRACTION W/  INTRAOCULAR LENS IMPLANT Bilateral     EPIDURAL STEROID INJECTION N/A 2/24/2023    Procedure: HEIDI C7-T1;  Surgeon: Andi Cisneros DO;  Location: [facility];  Service: Pain Management;  Laterality: N/A;    EPIDURAL STEROID INJECTION INTO CERVICAL SPINE N/A 6/10/2024    Procedure: C7-T1 HEIDI (toward the left);  Surgeon: Andi Cisneros DO;  Location: [facility];  Service: Pain Management;  Laterality: N/A;  RN sed w/Versed and Benadryl    EYE SURGERY      HAND SURGERY      INJECTION OF ANESTHETIC AGENT AROUND MEDIAL BRANCH NERVES INNERVATING CERVICAL  FACET JOINT N/A 1/4/2022    Procedure: Cervical Medial Branch Block C5-6, C6-7 (No Sedation);  Surgeon: Himanshu Blackmon Jr., MD;  Location: [facility];  Service: Pain Management;  Laterality: N/A;    INJECTION OF ANESTHETIC AGENT AROUND MEDIAL BRANCH NERVES INNERVATING LUMBAR FACET JOINT Left 11/18/2021    Procedure: Cervical Medial Branch Block Left C5-6, C6-7 (IV Sedation);  Surgeon: Himanshu Blackmon Jr., MD;  Location: [facility];  Service: Pain Management;  Laterality: Left;    JOINT REPLACEMENT Right     ELZBIETA    KNEE ARTHROPLASTY Left 8/10/2020    Procedure: ARTHROPLASTY, KNEE-ADE NEXGEN/CEMENTED TIBIA;  Surgeon: Kalin Pacheco MD;  Location: [facility] OR;  Service: Orthopedics;  Laterality: Left;    RADIOFREQUENCY THERMAL COAGULATION OF MEDIAL BRANCH OF POSTERIOR RAMUS OF CERVICAL SPINAL NERVE Left 3/8/2022    Procedure: RADIOFREQUENCY THERMAL COAGULATION, NERVE, SPINAL, CERVICAL, LEFT C5-6 AND C6-7;  Surgeon: Himanshu Blackmon Jr., MD;  Location: [facility]IN [facility];  Service: Pain Management;  Laterality: Left;  NO PACEMAKER   ASA    thyriodectomy      partial    tonsillectomy      TONSILLECTOMY      TOTAL HIP ARTHROPLASTY      right    Yag  Left        Review of patient's allergies indicates:   Allergen Reactions    Sulfa (sulfonamide antibiotics) Rash    Clarithromycin Other (See Comments)     Weak, extreme fatigue. dizziness    Flexeril [cyclobenzaprine] Other (See Comments)     Dizziness    Iodine and iodide containing products     Lisinopril Other (See Comments)     Cough and sensation of throat swelling/?angioedema    Losartan Rash    Metoprolol Swelling     Tightness in throat    Spironolactone      Throat tightening    Tramadol Other (See Comments)     Dizzy and weak    Verapamil (bulk) Palpitations    Voltaren [diclofenac sodium] Other (See Comments)     Drops blood pressure       Current Facility-Administered Medications   Medication    LIDOcaine 5 % patch 1 patch     Current Outpatient  Medications   Medication Sig    acetaminophen (TYLENOL) 650 MG TbSR Take 1 tablet (650 mg total) by mouth every 8 (eight) hours.    aliskiren (TEKTURNA) 300 MG Tab TAKE 1 TABLET BY MOUTH EVERY DAY    amLODIPine (NORVASC) 2.5 MG tablet Take 1 tablet (2.5 mg total) by mouth 2 (two) times daily.    amoxicillin-clavulanate 875-125mg (AUGMENTIN) 875-125 mg per tablet Take 1 tablet by mouth every 12 (twelve) hours.    aspirin (ECOTRIN) 81 MG EC tablet TAKE 1 TABLET BY MOUTH EVERY DAY    b complex vitamins tablet Take 1 tablet by mouth once daily.    coenzyme Q10 100 mg capsule Take 100 mg by mouth once daily.    ezetimibe (ZETIA) 10 mg tablet TAKE 1 TABLET BY MOUTH EVERY DAY    glucosamine-chondroitin 500-400 mg tablet Take 1 tablet by mouth once daily.     hydrALAZINE (APRESOLINE) 25 MG tablet Take 1 tablet (25 mg total) by mouth 2 (two) times daily as needed (for systolic blood pressure >160).    labetaloL (NORMODYNE) 100 MG tablet TAKE ONE AND ONE-HALF TABLETS BY MOUTH EVERY 12 HOURS    LORazepam (ATIVAN) 0.5 MG tablet TAKE 1/2 TABLET TO 1 TABLET BY MOUTH EVERY 12 HOURS AS NEEDED FOR ANXIETY    multivitamin capsule Take 1 capsule by mouth once daily.    omeprazole (PRILOSEC OTC) 20 MG tablet Take 20 mg by mouth once daily.    pravastatin (PRAVACHOL) 80 MG tablet TAKE 1 TABLET (80 MG TOTAL) BY MOUTH ONCE DAILY.    psyllium husk, aspartame, (METAMUCIL) 3.4 gram PwPk packet Take 1 packet by mouth once daily.    TURMERIC ORAL Take 500 mg by mouth once daily.     vitamin D (VITAMIN D3) 1000 units Tab Take 1,000 Units by mouth once daily.     Family History       Problem Relation (Age of Onset)    Breast cancer Mother    Cancer Mother, Paternal Aunt    Glaucoma Paternal Grandmother    Heart disease Father, Paternal Aunt    Stroke Paternal Grandfather          Tobacco Use    Smoking status: Never     Passive exposure: Never    Smokeless tobacco: Never   Substance and Sexual Activity    Alcohol use: Yes     Comment: socially -  "celebrations only    Drug use: Never    Sexual activity: Not Currently     ROS  Constitutional: negative for fevers or chills  Eyes: negative visual changes or eye discharge  ENT: negative for ear pain or sore throat  Respiratory: negative for shortness of breath or cough  Cardiovascular: negative for chest pain or palpitations  Gastrointestinal: negative for abdominal pain, nausea, or vomiting  Genitourinary: negative for dysuria and flank pain  Neurological: negative for headaches or dizziness      Objective:     Vital Signs (Most Recent):  Temp: 97.8 °F (36.6 °C) (05/08/25 1930)  Pulse: 69 (05/08/25 1930)  Resp: 20 (05/08/25 1930)  BP: (!) 169/87 (05/08/25 1930)  SpO2: 95 % (05/08/25 1930) Vital Signs (24h Range):  Temp:  [97.8 °F (36.6 °C)-98.3 °F (36.8 °C)] 97.8 °F (36.6 °C)  Pulse:  [69-76] 69  Resp:  [16-20] 20  SpO2:  [92 %-95 %] 95 %  BP: (124-169)/(73-87) 169/87     Weight: 60.3 kg (132 lb 15 oz)  Height: 5' 2" (157.5 cm)  Body mass index is 24.31 kg/m².    No intake or output data in the 24 hours ending 05/08/25 2111     Ortho/SPM Exam     General:  no acute distress, appears stated age   Neuro: alert and oriented x3  Psych: normal mood  Head: normocephalic, atraumatic.  Eyes: no scleral icterus  Mouth: moist mucous membranes  CV: extremities warm and well perfused  Pulm: breathing comfortably, equal chest rise bilat  Skin: clean, dry, intact (any exceptions noted in below musculoskeletal exam)    MSK:    RUE:  - Skin intact throughout, no open wounds  - No swelling  - No ecchymosis, erythema, or signs of cellulitis  - NonTTP throughout  - AROM and PROM of the shoulder, elbow, wrist, and hand intact without pain  - Axillary/AIN/PIN/Radial/Median/Ulnar Nerves assessed in isolation without deficit  - SILT throughout  - Compartments soft  - Radial artery palpated   - Capillary Refill <3s    LUE:  - Skin intact throughout, no open wounds  - No swelling  - No ecchymosis, erythema, or signs of cellulitis  - " NonTTP throughout  - AROM and PROM of the shoulder, elbow, wrist, and hand intact without pain  - inability to forward flex or abduct shoulder past 90° secondary to cuff tear  - Axillary/AIN/PIN/Radial/Median/Ulnar Nerves assessed in isolation without deficit  - SILT throughout  - Compartments soft  - Radial artery palpated   - Capillary Refill <3s    RLE:  - Skin intact throughout, no open wounds  - No swelling  - No ecchymosis, erythema, or signs of cellulitis  - NonTTP throughout  - AROM and PROM of the hip, knee, ankle, and foot intact without pain  - TA/EHL/Gastroc/FHL assessed in isolation without deficit  - SILT throughout  - Compartments soft  - DP and PT palpated  - Capillary Refill <3s  - Negative [facility]  - No hip pain with axial loading  - Negative [facility]    LLE:  - Skin intact throughout, no open wounds  - No swelling  - No ecchymosis, erythema, or signs of cellulitis  - NonTTP throughout  - AROM and PROM of the hip, knee, ankle, and foot intact without pain  - TA/EHL/Gastroc/FHL assessed in isolation without deficit  - SILT throughout  - Compartments soft  - DP and PT palpated  - Capillary Refill <3s  - Negative [facility]  - No hip pain with axial loading  - Negative [facility]    Spine/pelvis/axial body:  No tenderness to palpation of cervical, thoracic, or lumbar spine  Mild midline TTP of sacrum  No pain with compression of pelvis    Significant Labs: All pertinent labs within the past 24 hours have been reviewed.    Significant Imaging: I have reviewed and interpreted all pertinent imaging results/findings.  XR/CT Pelvis:  There is an acute appearing transversely oriented fracture of S3 with minimal displacement.  Right total hip arthroplasty, hardware appears intact.

## 2025-05-09 NOTE — TELEPHONE ENCOUNTER
Called patient to provide appointment details; no answer. Patient has viewed the appointment information through the patient portal.

## 2025-05-09 NOTE — CONSULTS
[facility] Dept  Orthopedics  Consult Note    Patient Name: Gerda Lai  MRN: [id number]  Admission Date: 5/8/2025  Hospital Length of Stay: 0 days  Attending Provider: No att. providers found  Primary Care Provider: Tomas Beltran MD        Inpatient consult to Orthopedics  Consult performed by: BERKLEY Stewart MD  Consult ordered by: Jean Claude Patel MD        Subjective:     Principal Problem:Closed fracture of sacrum    Chief Complaint:   Chief Complaint   Patient presents with    Fall     Reports falling yesterday, denies LOC, hit back of her head, not on blood thinners. C/o tailbone pain        HPI: Gerda Lai is a  89 y.o. female with PMH significant for R TKA w/ Dr. Cobian 2023, L TKA with Dr. Pacheco 2020, HLD, HTN, thyroid disease, fibromyalgia presenting with fall and buttocks pain. Patient states Wednesday afternoon she was unloading her trunk when she fell backwards onto her buttocks and hit her head.  No loss of consciousness or vomiting.  She was able to ambulate inside of the house after that.  She has been able to ambulate with minimal to no pain since the fall.  She states certain seated positions will cause discomfort.  She presented to the ED out of concern of hitting her head.  Patient denies numbness and tingling.  She denies any bowel or bladder incontinence.  Denies any other musculoskeletal pain or injuries. Known history of prior bilateral knee arthroplasty.    Ambulates with a walker @ baseline.  Community ambulator.  Tobacco Use: 0 ppd  IVDU: none  Lives with self in home.  Anticoagulation: Baby aspirin  Immunosuppressive medications: none   Occupation:  retired  0 of MI, CVA, DVT/PE.  No hx of cancer, chemotherapy, or radiation.      Past Medical History:   Diagnosis Date    Anxiety     Arthritis     Basal cell carcinoma 09/2016    right post auricular neck     Basal cell carcinoma of right forearm 04/19/2023    R lower forearm    Breast cancer 1992    right    Cataract      Fibromyalgia     History of measles as a child     Hyperlipidemia     Hypertension     Personal history of colonic polyps     Pneumonia     SCC (squamous cell carcinoma) 2015    R chest    SCC (squamous cell carcinoma) 2016    left medial shoulder    SCC (squamous cell carcinoma) 2017    left knee    SCC (squamous cell carcinoma) 2022    L upper chest, R upper arm KA types    Shingles     Squamous cell carcinoma 2015    right forearm    Thyroid disease     Vaginitis        Past Surgical History:   Procedure Laterality Date    ADENOIDECTOMY      ARTHROPLASTY, KNEE, TOTAL, USING COMPUTER-ASSISTED NAVIGATION Right 5/22/2023    Procedure: ARTHROPLASTY, KNEE, TOTAL, USING COMPUTER-ASSISTED NAVIGATION: QUYEN: RIGHT: MARTHA - TRIATHALON;  Surgeon: Milton Cobian III, MD;  Location: [facility] OR;  Service: Orthopedics;  Laterality: Right;    BREAST BIOPSY Left     Excisional bx, benign    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    CARPAL TUNNEL RELEASE Right 3/16/2021    Procedure: RELEASE, CARPAL TUNNEL;  Surgeon: Barbara Aldridge MD;  Location: [facility] OR;  Service: Orthopedics;  Laterality: Right;    CATARACT EXTRACTION W/  INTRAOCULAR LENS IMPLANT Bilateral     EPIDURAL STEROID INJECTION N/A 2/24/2023    Procedure: HEIDI C7-T1;  Surgeon: Andi Cisneros DO;  Location: [facility] OR;  Service: Pain Management;  Laterality: N/A;    EPIDURAL STEROID INJECTION INTO CERVICAL SPINE N/A 6/10/2024    Procedure: C7-T1 HEIDI (toward the left);  Surgeon: Andi Cisneros DO;  Location: [facility] PAIN MANAGEMENT;  Service: Pain Management;  Laterality: N/A;  RN sed w/Versed and Benadryl    EYE SURGERY      HAND SURGERY      INJECTION OF ANESTHETIC AGENT AROUND MEDIAL BRANCH NERVES INNERVATING CERVICAL FACET JOINT N/A 1/4/2022    Procedure: Cervical Medial Branch Block C5-6, C6-7 (No Sedation);  Surgeon: Himanshu Blackmon Jr., MD;  Location: [facility] PAIN MGT;  Service: Pain Management;  Laterality: N/A;    INJECTION OF ANESTHETIC AGENT AROUND  MEDIAL BRANCH NERVES INNERVATING LUMBAR FACET JOINT Left 11/18/2021    Procedure: Cervical Medial Branch Block Left C5-6, C6-7 (IV Sedation);  Surgeon: Himanshu Blackmon Jr., MD;  Location: [facility] PAIN MGT;  Service: Pain Management;  Laterality: Left;    JOINT REPLACEMENT Right     ELZBIETA    KNEE ARTHROPLASTY Left 8/10/2020    Procedure: ARTHROPLASTY, KNEE-ADE NEXGEN/CEMENTED TIBIA;  Surgeon: Kalin Pacheco MD;  Location: [facility] OR;  Service: Orthopedics;  Laterality: Left;    RADIOFREQUENCY THERMAL COAGULATION OF MEDIAL BRANCH OF POSTERIOR RAMUS OF CERVICAL SPINAL NERVE Left 3/8/2022    Procedure: RADIOFREQUENCY THERMAL COAGULATION, NERVE, SPINAL, CERVICAL, LEFT C5-6 AND C6-7;  Surgeon: Himanshu Blackmon Jr., MD;  Location: [facility] PAIN MGT;  Service: Pain Management;  Laterality: Left;  NO PACEMAKER   ASA    thyriodectomy      partial    tonsillectomy      TONSILLECTOMY      TOTAL HIP ARTHROPLASTY      right    Yag  Left        Review of patient's allergies indicates:   Allergen Reactions    Sulfa (sulfonamide antibiotics) Rash    Clarithromycin Other (See Comments)     Weak, extreme fatigue. dizziness    Flexeril [cyclobenzaprine] Other (See Comments)     Dizziness    Iodine and iodide containing products     Lisinopril Other (See Comments)     Cough and sensation of throat swelling/?angioedema    Losartan Rash    Metoprolol Swelling     Tightness in throat    Spironolactone      Throat tightening    Tramadol Other (See Comments)     Dizzy and weak    Verapamil (bulk) Palpitations    Voltaren [diclofenac sodium] Other (See Comments)     Drops blood pressure       Current Facility-Administered Medications   Medication    LIDOcaine 5 % patch 1 patch     Current Outpatient Medications   Medication Sig    acetaminophen (TYLENOL) 650 MG TbSR Take 1 tablet (650 mg total) by mouth every 8 (eight) hours.    aliskiren (TEKTURNA) 300 MG Tab TAKE 1 TABLET BY MOUTH EVERY DAY    amLODIPine (NORVASC) 2.5 MG tablet Take 1 tablet  (2.5 mg total) by mouth 2 (two) times daily.    amoxicillin-clavulanate 875-125mg (AUGMENTIN) 875-125 mg per tablet Take 1 tablet by mouth every 12 (twelve) hours.    aspirin (ECOTRIN) 81 MG EC tablet TAKE 1 TABLET BY MOUTH EVERY DAY    b complex vitamins tablet Take 1 tablet by mouth once daily.    coenzyme Q10 100 mg capsule Take 100 mg by mouth once daily.    ezetimibe (ZETIA) 10 mg tablet TAKE 1 TABLET BY MOUTH EVERY DAY    glucosamine-chondroitin 500-400 mg tablet Take 1 tablet by mouth once daily.     hydrALAZINE (APRESOLINE) 25 MG tablet Take 1 tablet (25 mg total) by mouth 2 (two) times daily as needed (for systolic blood pressure >160).    labetaloL (NORMODYNE) 100 MG tablet TAKE ONE AND ONE-HALF TABLETS BY MOUTH EVERY 12 HOURS    LORazepam (ATIVAN) 0.5 MG tablet TAKE 1/2 TABLET TO 1 TABLET BY MOUTH EVERY 12 HOURS AS NEEDED FOR ANXIETY    multivitamin capsule Take 1 capsule by mouth once daily.    omeprazole (PRILOSEC OTC) 20 MG tablet Take 20 mg by mouth once daily.    pravastatin (PRAVACHOL) 80 MG tablet TAKE 1 TABLET (80 MG TOTAL) BY MOUTH ONCE DAILY.    psyllium husk, aspartame, (METAMUCIL) 3.4 gram PwPk packet Take 1 packet by mouth once daily.    TURMERIC ORAL Take 500 mg by mouth once daily.     vitamin D (VITAMIN D3) 1000 units Tab Take 1,000 Units by mouth once daily.     Family History       Problem Relation (Age of Onset)    Breast cancer Mother    Cancer Mother, Paternal Aunt    Glaucoma Paternal Grandmother    Heart disease Father, Paternal Aunt    Stroke Paternal Grandfather          Tobacco Use    Smoking status: Never     Passive exposure: Never    Smokeless tobacco: Never   Substance and Sexual Activity    Alcohol use: Yes     Comment: socially - celebrations only    Drug use: Never    Sexual activity: Not Currently     ROS  Constitutional: negative for fevers or chills  Eyes: negative visual changes or eye discharge  ENT: negative for ear pain or sore throat  Respiratory: negative for  "shortness of breath or cough  Cardiovascular: negative for chest pain or palpitations  Gastrointestinal: negative for abdominal pain, nausea, or vomiting  Genitourinary: negative for dysuria and flank pain  Neurological: negative for headaches or dizziness      Objective:     Vital Signs (Most Recent):  Temp: 97.8 °F (36.6 °C) (05/08/25 1930)  Pulse: 69 (05/08/25 1930)  Resp: 20 (05/08/25 1930)  BP: (!) 169/87 (05/08/25 1930)  SpO2: 95 % (05/08/25 1930) Vital Signs (24h Range):  Temp:  [97.8 °F (36.6 °C)-98.3 °F (36.8 °C)] 97.8 °F (36.6 °C)  Pulse:  [69-76] 69  Resp:  [16-20] 20  SpO2:  [92 %-95 %] 95 %  BP: (124-169)/(73-87) 169/87     Weight: 60.3 kg (132 lb 15 oz)  Height: 5' 2" (157.5 cm)  Body mass index is 24.31 kg/m².    No intake or output data in the 24 hours ending 05/08/25 2111     Ortho/SPM Exam     General:  no acute distress, appears stated age   Neuro: alert and oriented x3  Psych: normal mood  Head: normocephalic, atraumatic.  Eyes: no scleral icterus  Mouth: moist mucous membranes  CV: extremities warm and well perfused  Pulm: breathing comfortably, equal chest rise bilat  Skin: clean, dry, intact (any exceptions noted in below musculoskeletal exam)    MSK:    RUE:  - Skin intact throughout, no open wounds  - No swelling  - No ecchymosis, erythema, or signs of cellulitis  - NonTTP throughout  - AROM and PROM of the shoulder, elbow, wrist, and hand intact without pain  - Axillary/AIN/PIN/Radial/Median/Ulnar Nerves assessed in isolation without deficit  - SILT throughout  - Compartments soft  - Radial artery palpated   - Capillary Refill <3s    LUE:  - Skin intact throughout, no open wounds  - No swelling  - No ecchymosis, erythema, or signs of cellulitis  - NonTTP throughout  - AROM and PROM of the shoulder, elbow, wrist, and hand intact without pain  - inability to forward flex or abduct shoulder past 90° secondary to cuff tear  - Axillary/AIN/PIN/Radial/Median/Ulnar Nerves assessed in isolation " without deficit  - SILT throughout  - Compartments soft  - Radial artery palpated   - Capillary Refill <3s    RLE:  - Skin intact throughout, no open wounds  - No swelling  - No ecchymosis, erythema, or signs of cellulitis  - NonTTP throughout  - AROM and PROM of the hip, knee, ankle, and foot intact without pain  - TA/EHL/Gastroc/FHL assessed in isolation without deficit  - SILT throughout  - Compartments soft  - DP and PT palpated  - Capillary Refill <3s  - Negative Log roll  - No hip pain with axial loading  - Negative [facility]    LLE:  - Skin intact throughout, no open wounds  - No swelling  - No ecchymosis, erythema, or signs of cellulitis  - NonTTP throughout  - AROM and PROM of the hip, knee, ankle, and foot intact without pain  - TA/EHL/Gastroc/FHL assessed in isolation without deficit  - SILT throughout  - Compartments soft  - DP and PT palpated  - Capillary Refill <3s  - Negative Log roll  - No hip pain with axial loading  - Negative [facility]    Spine/pelvis/axial body:  No tenderness to palpation of cervical, thoracic, or lumbar spine  Mild midline TTP of sacrum  No pain with compression of pelvis    Significant Labs: All pertinent labs within the past 24 hours have been reviewed.    Significant Imaging: I have reviewed and interpreted all pertinent imaging results/findings.  XR/CT Pelvis:  There is an acute appearing transversely oriented fracture of S3 with minimal displacement.  Right total hip arthroplasty, hardware appears intact.  Assessment/Plan:     * Closed fracture of sacrum  Gerda Lai is a 89 y.o. female who presents with a minimally displaced transverse S3 sacral fracture after a ground level fall onto her buttocks Wednesday afternoon.  Closed, NVI.  Patient lives at home alone and ambulates with a walker.  She has ambulated since the fall with no pain.  She occasionally has pain when sitting in certain positions.  On exam, she has 5/5 strength throughout.  Mild midline TTP over  sacrum.  Denies any numbness, tingling or bowel/bladder incontinence.  Pelvic ring appears stable on imaging.  Given patient has been ambulating without difficulty since the fall and fracture/pelvic ring appears stable on imaging, okay to discharge home from orthopedic perspective.    - OTC pain control  - WBAT BLE  - Return precautions given including any new numbness/tingling or bowel/bladder dysfunction  - patient will follow up outpatient clinic              BERKLEY Stewart MD  Orthopedics  [facility] - [facility] Dept

## 2025-05-09 NOTE — ASSESSMENT & PLAN NOTE
Gerda Lai is a 89 y.o. female who presents with a minimally displaced transverse S3 sacral fracture after a ground level fall onto her buttocks Wednesday afternoon.  Closed, NVI.  Patient lives at home alone and ambulates with a walker.  She has ambulated since the fall with no pain.  She occasionally has pain when sitting in certain positions.  On exam, she has 5/5 strength throughout.  Mild midline TTP over sacrum.  Denies any numbness, tingling or bowel/bladder incontinence.  Pelvic ring appears stable on imaging.  Given patient has been ambulating without difficulty since the fall and fracture/pelvic ring appears stable on imaging, okay to discharge home from orthopedic perspective.    - OTC pain control  - WBAT BLE  - Return precautions given including any new numbness/tingling or bowel/bladder dysfunction  - patient will follow up outpatient clinic

## 2025-05-09 NOTE — TELEPHONE ENCOUNTER
Confirm appointment for 5/12/25.  Spoke with pt.. Pt was informed to wear comfortable clothing with no button, zip, pins, or metal. For a sacral xray. Patient states verbal understanding and has no further questions.

## 2025-05-09 NOTE — ED NOTES
Bed: 09  Expected date:   Expected time:   Means of arrival:   Comments:  Shelly Mitchell   denies loose teeth, wears upper full dentures and has few dental implants at the bottom/normal

## 2025-05-09 NOTE — PROVIDER PROGRESS NOTES - EMERGENCY DEPT.
Signout Note    I received signout from the previous provider.     Chief complaint:  Fall (Reports falling yesterday, denies LOC, hit back of her head, not on blood thinners. C/o tailbone pain)      Pertinent history &exam:  Gerda Lai is a 89 y.o. female with pertinent PMH of hypertension, CKD who presented to emergency department with complaint of mechanical fall, found to have sacral fracture.  She is able to ambulate in his neuro intact.  Walks with walker.  The fall happened yesterday.  Signed out pending CT pelvis and Orthopedic surgery recommendations for final disposition.    Vitals:    05/08/25 2300   BP: (!) 183/77   Pulse: (!) 58   Resp:    Temp:        Imaging Studies:    CT Pelvis Without Contrast   Final Result      Acute minimally displaced fracture at S3-S4.  No other acute fracture.      Electronically signed by resident: Mark Rodrigues   Date:    05/08/2025   Time:    22:01      Electronically signed by: Amaury Montague   Date:    05/08/2025   Time:    22:35      X-Ray Sacrum And Coccyx   Final Result   Abnormal      S3-S4 fracture.  Consider CT for further evaluation as clinically warranted.      Otherwise negative pelvis.      This report was flagged in Epic as abnormal.         Electronically signed by: Amaury Montague   Date:    05/08/2025   Time:    19:36      X-Ray Pelvis Routine AP   Final Result   Abnormal      S3-S4 fracture.  Consider CT for further evaluation as clinically warranted.      Otherwise negative pelvis.      This report was flagged in Epic as abnormal.         Electronically signed by: Amaury Montague   Date:    05/08/2025   Time:    19:36      CT Cervical Spine Without Contrast   Final Result      No acute fracture or traumatic malalignment identified.      Severe spondylosis and facet arthropathy with stenoses similar to prior MRI of 2023.         Electronically signed by: Amaury Montague   Date:    05/08/2025   Time:    18:14      CT Head Without Contrast   Final  Result      No acute intracranial pathology.      Age-related changes.      Electronically signed by resident: Tomas Calzada   Date:    05/08/2025   Time:    18:24      Electronically signed by: Sandip Magallon MD   Date:    05/08/2025   Time:    19:03          Medications Given:  Medications   LIDOcaine 5 % patch 1 patch (1 patch Transdermal Patch Applied 5/8/25 1750)   acetaminophen tablet 1,000 mg (1,000 mg Oral Given 5/8/25 2038)   labetaloL tablet 100 mg (100 mg Oral Given 5/8/25 2154)       Pending Items/ MDM:  89 y.o. female with above complaints.  CT pelvis completed demonstrating sacral fracture.  Patient seen by Orthopedic surgery, appropriate for discharge home.    Diagnostic Impression:    1. Closed fracture of sacrum, unspecified portion of sacrum, initial encounter    2. Fall in elderly patient         ED Disposition Condition    Discharge Stable          ED Prescriptions    None       Follow-up Information       Follow up With Specialties Details Why Contact Info Additional Information    Tomas Beltran MD Internal Medicine Schedule an appointment as soon as possible for a visit   [address] [facility] [address]  [phone]       [facility] Schedule an appointment as soon as possible for a visit   [address], [address]  [facility] [address] [phone] [facility] - Main Building, 5th Floor Please park in [address] and take [address] elevator            Leigh Kelly MD  Emergency Medicine

## 2025-05-09 NOTE — HPI
Gerda Lai is a  89 y.o. female with PMH significant for R TKA w/ Dr. Cobian 2023, L TKA with Dr. Pacheco 2020, HLD, HTN, thyroid disease, fibromyalgia presenting with fall and buttocks pain. Patient states Wednesday afternoon she was unloading her trunk when she fell backwards onto her buttocks and hit her head.  No loss of consciousness or vomiting.  She was able to ambulate inside of the house after that.  She has been able to ambulate with minimal to no pain since the fall.  She states certain seated positions will cause discomfort.  She presented to the ED out of concern of hitting her head.  Patient denies numbness and tingling.  She denies any bowel or bladder incontinence.  Denies any other musculoskeletal pain or injuries. Known history of prior bilateral knee arthroplasty.    Ambulates with a walker @ baseline.  Community ambulator.  Tobacco Use: 0 ppd  IVDU: none  Lives with self in home.  Anticoagulation: Baby aspirin  Immunosuppressive medications: none   Occupation:  retired  0 of MI, CVA, DVT/PE.  No hx of cancer, chemotherapy, or radiation.

## 2025-05-09 NOTE — TELEPHONE ENCOUNTER
----- Message from BERKLEY Stewart sent at 5/9/2025  8:51 AM CDT -----  Hello.  Can we have this patient follow up in clinic.  She sustained a minimally displaced transverse sacral fracture at S3.  Neurologically intact and was able to ambulate without pain.  Thank you.

## 2025-05-12 ENCOUNTER — OFFICE VISIT (OUTPATIENT)
Dept: ORTHOPEDICS | Facility: CLINIC | Age: OVER 89
End: 2025-05-12
Payer: MEDICARE

## 2025-05-12 ENCOUNTER — HOSPITAL ENCOUNTER (OUTPATIENT)
Dept: RADIOLOGY | Facility: HOSPITAL | Age: OVER 89
Discharge: HOME | End: 2025-05-12
Attending: PHYSICIAN ASSISTANT
Payer: MEDICARE

## 2025-05-12 VITALS — HEIGHT: 62 IN | BODY MASS INDEX: 24.46 KG/M2 | WEIGHT: 132.94 LBS

## 2025-05-12 DIAGNOSIS — S32.130A CLOSED NONDISPLACED ZONE III FRACTURE OF SACRUM, INITIAL ENCOUNTER: ICD-10-CM

## 2025-05-12 DIAGNOSIS — T14.8XXA FRACTURE: ICD-10-CM

## 2025-05-12 DIAGNOSIS — T14.8XXA FRACTURE: Primary | ICD-10-CM

## 2025-05-12 PROCEDURE — 99999 PR PBB SHADOW E&M-EST. PATIENT-LVL III: CPT | Mod: PBBFAC,,, | Performed by: PHYSICIAN ASSISTANT

## 2025-05-12 PROCEDURE — 99214 OFFICE O/P EST MOD 30 MIN: CPT | Mod: S$PBB,,, | Performed by: PHYSICIAN ASSISTANT

## 2025-05-12 PROCEDURE — 72220 X-RAY EXAM SACRUM TAILBONE: CPT | Mod: TC

## 2025-05-12 PROCEDURE — 72190 X-RAY EXAM OF PELVIS: CPT | Mod: 26,,, | Performed by: RADIOLOGY

## 2025-05-12 PROCEDURE — 72190 X-RAY EXAM OF PELVIS: CPT | Mod: TC

## 2025-05-12 PROCEDURE — 72220 X-RAY EXAM SACRUM TAILBONE: CPT | Mod: 26,,, | Performed by: RADIOLOGY

## 2025-05-12 PROCEDURE — 99213 OFFICE O/P EST LOW 20 MIN: CPT | Mod: PBBFAC,25 | Performed by: PHYSICIAN ASSISTANT

## 2025-05-12 NOTE — PROGRESS NOTES
Subjective     Patient ID: Gerda Lai is a 89 y.o. female.    Chief Complaint: Injury and Pain of the Pelvis    HPI    Patient is a 89 year old female who presents to the ED on 05/08/25 with buttock pain s/p fall.   RADS: XR/CT Pelvis:  There is an acute appearing transversely oriented fracture of S3 with minimal displacement.  Right total hip arthroplasty, hardware appears intact.   Patient treated non-operative. Weight bearing as tolerated, RAOMT  She stated that she is doing well. Pain is minimal and she is not needing to take anything for pain. She has used a salon Pas patch which helps. She has returned to most activity she does. No numbness or tingling. Using a walker to assist with ambulation.     Ambulates with a walker @ baseline.  Community ambulator.  Tobacco Use: 0 ppd  IVDU: none  Lives with self in home.  Anticoagulation: Baby aspirin  Immunosuppressive medications: none   Occupation:  retired  0 of MI, CVA, DVT/PE.  No hx of cancer, chemotherapy, or radiation.      ROS       Objective     Ortho/SPM Exam    Physical Exam     RLE:  - Skin intact throughout, no open wounds  - No swelling  - No ecchymosis, erythema, or signs of cellulitis  - NonTTP throughout  - AROM and PROM of the hip, knee, ankle, and foot intact without pain  - TA/EHL/Gastroc/FHL assessed in isolation without deficit  - SILT throughout  - Compartments soft  - DP and PT palpated  - Capillary Refill <3s  - Negative Log roll  - No hip pain with axial loading  - Negative [facility]     LLE:  - Skin intact throughout, no open wounds  - No swelling  - No ecchymosis, erythema, or signs of cellulitis  - NonTTP throughout  - AROM and PROM of the hip, knee, ankle, and foot intact without pain  - TA/EHL/Gastroc/FHL assessed in isolation without deficit  - SILT throughout  - Compartments soft  - DP and PT palpated  - Capillary Refill <3s  - Negative Log roll  - No hip pain with axial loading  - [facility]     Spine/pelvis/axial  body:  No tenderness to palpation of cervical, thoracic, or lumbar spine  Mild midline TTP of sacrum  No pain with compression of pelvis    RADS: reviewed by myself:   Degenerative changes seen at the lumbosacral level and a hip prosthesis is seen on the right. Fracture of the sacrum at the S3-S4 level appears stable.        Assessment and Plan     Encounter Diagnoses   Name Primary?    Fracture Yes    Closed nondisplaced zone III fracture of sacrum, initial encounter        Discussed plan with the patient. At this time she has minimal to no pain. She is weight bearing as tolerated, ROMAT, activity as tolerated. Activity level is about at baseline. She is to return to clinic in 4 weeks if any pain.

## 2025-05-14 ENCOUNTER — OFFICE VISIT (OUTPATIENT)
Dept: INTERNAL MEDICINE | Facility: CLINIC | Age: OVER 89
End: 2025-05-14
Payer: MEDICARE

## 2025-05-14 VITALS
HEIGHT: 63 IN | BODY MASS INDEX: 23.63 KG/M2 | WEIGHT: 133.38 LBS | HEART RATE: 60 BPM | OXYGEN SATURATION: 97 % | SYSTOLIC BLOOD PRESSURE: 184 MMHG | DIASTOLIC BLOOD PRESSURE: 60 MMHG | TEMPERATURE: 98 F

## 2025-05-14 DIAGNOSIS — S32.130D CLOSED NONDISPLACED ZONE III FRACTURE OF SACRUM WITH ROUTINE HEALING, SUBSEQUENT ENCOUNTER: ICD-10-CM

## 2025-05-14 DIAGNOSIS — D69.2 SENILE PURPURA: ICD-10-CM

## 2025-05-14 DIAGNOSIS — N18.32 STAGE 3B CHRONIC KIDNEY DISEASE: Chronic | ICD-10-CM

## 2025-05-14 DIAGNOSIS — I65.23 BILATERAL CAROTID ARTERY STENOSIS: Chronic | ICD-10-CM
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
## 2025-05-14 DIAGNOSIS — I10 PRIMARY HYPERTENSION: Primary | Chronic | ICD-10-CM

## 2025-05-14 DIAGNOSIS — F41.9 ANXIETY: Chronic | ICD-10-CM

## 2025-05-14 DIAGNOSIS — E78.49 OTHER HYPERLIPIDEMIA: Chronic | ICD-10-CM

## 2025-05-14 DIAGNOSIS — M54.17 LUMBOSACRAL RADICULOPATHY AT L5: ICD-10-CM

## 2025-05-14 DIAGNOSIS — M47.812 CERVICAL SPONDYLOSIS: ICD-10-CM

## 2025-05-14 DIAGNOSIS — I70.0 AORTIC ATHEROSCLEROSIS: Chronic | ICD-10-CM

## 2025-05-14 PROCEDURE — 99214 OFFICE O/P EST MOD 30 MIN: CPT | Mod: S$PBB,,, | Performed by: INTERNAL MEDICINE

## 2025-05-14 PROCEDURE — 99214 OFFICE O/P EST MOD 30 MIN: CPT | Mod: PBBFAC,PO | Performed by: INTERNAL MEDICINE

## 2025-05-14 PROCEDURE — 99999 PR PBB SHADOW E&M-EST. PATIENT-LVL IV: CPT | Mod: PBBFAC,,, | Performed by: INTERNAL MEDICINE

## 2025-05-14 PROCEDURE — G2211 COMPLEX E/M VISIT ADD ON: HCPCS | Mod: S$PBB,,, | Performed by: INTERNAL MEDICINE

## 2025-05-14 NOTE — PROGRESS NOTES
Assessment:       1. Primary hypertension    2. Other hyperlipidemia    3. Bilateral carotid artery stenosis    4. Aortic atherosclerosis    5. Stage 3b chronic kidney disease    6. Cervical spondylosis    7. Lumbosacral radiculopathy at L5    8. Anxiety    9. Senile purpura    10. Fall, initial encounter    11. Closed nondisplaced zone III fracture of sacrum with routine healing, subsequent encounter        Plan:       1. Continue labetalol 100 mg 1-1/2 tablets twice daily, Norvasc 2.5 mg, and Tekturna 300 mg. Hydralazine as needed.  2/3/4.  Continue pravastatin 80 mg, Zetia 10 mg   5.  Continue to check CMP.    6/7.  Monitor   8. Continue Ativan 0.5 mg half tablet nightly.  9. Monitor  10/11.  Continue to follow with Orthopedics.    Deep Scribe:  IMPRESSION:  1. BP control fluctuates between 138/50 and 170/69.  2. Additional BP management needed due to consistently elevated readings.  3. CT head and cervical spine negative for injuries after fall incident.  4. Sacral fracture (S4) diagnosed by orthopedics, expected to heal over approximately 1 year.  5. Considered pain management options for sacral fracture.    SUMMARY:   Increased amlodipine to twice daily dosing   Continue pravastatin and Zetia for cholesterol management   Continue Ativan 0.5 mg at bedtime   Continue hydralazine as needed when BP reaches 170   Continue labetalol 100 mg (1.5 tablets twice daily)   Continue aliskiren (Tekturna) 0.5 tablet twice daily (morning and night)   Ms. Lai to monitor BP twice daily and record readings   Follow-up in 2 weeks for BP check, patient to bring BP cuff and recorded readings    STAGE 3B CHRONIC KIDNEY DISEASE:   Monitored patient on multiple medications (labetalol, pravastatin, clonazepam, sertraline, and Ativan) that may affect kidney function.   Evaluated home blood pressure readings which show significant variation.   Will closely monitor blood pressure and plan to adjust amlodipine dosage to manage  hypertension and prevent further kidney damage.    HYPERTENSION:   Ms. Lai's home blood pressure readings are inconsistent and often elevated (ranging from 140 to 184), indicating uncontrolled hypertension.   Increased amlodipine to twice daily dosing.   Continue hydralazine as needed when BP reaches 170, labetalol 100 mg (1.5 tablets twice daily), and aliskiren (Tekturna) 0.5 tablet twice daily (morning and night).   Ms. Lai instructed to monitor BP twice daily (morning and evening), record readings, and bring both BP cuff and recorded readings to next appointment to verify accuracy against office equipment.    SACRAL FRACTURE:   Discussed healing process for sacral fractures, including body's phases of repair over course of a year.    ANXIETY:   Continue Ativan 0.5 mg at bedtime.    HYPERLIPIDEMIA:   Continue pravastatin and Zetia for cholesterol management.    FOLLOW-UP:   Scheduled follow up in 2 weeks for BP check.                 This note was generated with the assistance of ambient listening technology. Verbal consent was obtained by the patient and accompanying visitor(s) for the recording of patient appointment to facilitate this note. I attest to having reviewed and edited the generated note for accuracy, though some syntax or spelling errors may persist. Please contact the author of this note for any clarification.       Subjective:       Patient ID: Gerda Lai is a 89 y.o. female.    Chief Complaint: Follow-up    HPI    89-year-old female here for follow-up.    Patient has hypertension on labetalol 100 mg 1 and half tablets twice daily, Norvasc 2.5 mg, Tekturna 300 mg. Hydralazine as needed.    Patient has hyperlipidemia, carotid artery stenosis, aortic atherosclerosis on pravastatin 80 mg, on Zetia 10 mg.    Patient has stage IIIB chronic kidney disease monitor with CMP.    Patient has cervical spondylosis and lumbar radiculopathy on no current medication.    Patient has anxiety on Ativan  0.5 mg twice a day as needed.    History of Present Illness    CHIEF COMPLAINT:  Ms. Lai presents today for follow up of hypertension.    BLOOD PRESSURE MANAGEMENT:  She reports significant variability in home blood pressure readings, ranging from 138/50 to 169, with notably low diastolic pressure recently. She missed her medications today, which may have contributed to an elevated reading of 184 during the visit. She reports being able to sense when her blood pressure is high or low based on how she feels.    CURRENT MEDICATIONS:  She takes Labetalol 100mg one and half tablets twice daily, Aliskiren half tablet twice daily, Amlodipine at night only due to daytime dizziness and discomfort with full dose, and Ativan half tablet at night.    RECENT INJURY:  One week ago, she fell while attempting to open her car door in the driveway, hitting her tailbone and head. Due to her artificial knees, she required neighbor's assistance to get up from the cement surface. While she had no immediate pain, it developed later that night. She currently reports minimal residual tailbone pain, which has improved since the incident.      ROS:  Constitutional: +dizziness  Cardiovascular: +orthostatic symptoms  Musculoskeletal: +bone pain  Neurological: +balance issues  Psychiatric: +anxiety         Review of Systems          Objective:      Physical Exam  Vitals reviewed.   Constitutional:       Appearance: She is well-developed.   HENT:      Head: Normocephalic and atraumatic.      Mouth/Throat:      Pharynx: No oropharyngeal exudate.   Eyes:      General: No scleral icterus.        Right eye: No discharge.         Left eye: No discharge.      Pupils: Pupils are equal, round, and reactive to light.   Neck:      Thyroid: No thyromegaly.      Trachea: No tracheal deviation.   Cardiovascular:      Rate and Rhythm: Normal rate and regular rhythm.      Heart sounds: Normal heart sounds. No murmur heard.     No friction rub. No gallop.    Pulmonary:      Effort: Pulmonary effort is normal. No respiratory distress.      Breath sounds: Normal breath sounds. No wheezing or rales.   Chest:      Chest wall: No tenderness.   Abdominal:      General: Bowel sounds are normal. There is no distension.      Palpations: Abdomen is soft. There is no mass.      Tenderness: There is no abdominal tenderness. There is no guarding or rebound.   Musculoskeletal:         General: No tenderness. Normal range of motion.      Cervical back: Normal range of motion and neck supple.   Skin:     General: Skin is warm and dry.      Coloration: Skin is not pale.      Findings: No erythema or rash.   Neurological:      Mental Status: She is alert and oriented to person, place, and time.   Psychiatric:         Behavior: Behavior normal.

## 2025-05-28 ENCOUNTER — CLINICAL SUPPORT (OUTPATIENT)
Dept: INTERNAL MEDICINE | Facility: CLINIC | Age: OVER 89
End: 2025-05-28
Payer: MEDICARE

## 2025-05-28 ENCOUNTER — TELEPHONE (OUTPATIENT)
Dept: ORTHOPEDICS | Facility: CLINIC | Age: OVER 89
End: 2025-05-28
Payer: MEDICARE

## 2025-05-28 ENCOUNTER — OFFICE VISIT (OUTPATIENT)
Dept: INTERNAL MEDICINE | Facility: CLINIC | Age: OVER 89
End: 2025-05-28
Payer: MEDICARE

## 2025-05-28 ENCOUNTER — TELEPHONE (OUTPATIENT)
Dept: INTERNAL MEDICINE | Facility: CLINIC | Age: OVER 89
End: 2025-05-28

## 2025-05-28 VITALS
BODY MASS INDEX: 23.17 KG/M2 | WEIGHT: 130.75 LBS | OXYGEN SATURATION: 95 % | DIASTOLIC BLOOD PRESSURE: 60 MMHG | HEART RATE: 56 BPM | SYSTOLIC BLOOD PRESSURE: 150 MMHG | TEMPERATURE: 98 F | HEIGHT: 63 IN

## 2025-05-28 DIAGNOSIS — Z01.30 BLOOD PRESSURE CHECK: Primary | ICD-10-CM

## 2025-05-28 DIAGNOSIS — E78.49 OTHER HYPERLIPIDEMIA: Chronic | ICD-10-CM

## 2025-05-28 DIAGNOSIS — N18.32 STAGE 3B CHRONIC KIDNEY DISEASE: Chronic | ICD-10-CM

## 2025-05-28 DIAGNOSIS — I65.23 BILATERAL CAROTID ARTERY STENOSIS: Chronic | ICD-10-CM

## 2025-05-28 DIAGNOSIS — M54.17 LUMBOSACRAL RADICULOPATHY AT L5: Chronic | ICD-10-CM

## 2025-05-28 DIAGNOSIS — M47.812 CERVICAL SPONDYLOSIS: Chronic | ICD-10-CM

## 2025-05-28 DIAGNOSIS — I10 PRIMARY HYPERTENSION: Primary | Chronic | ICD-10-CM

## 2025-05-28 DIAGNOSIS — F41.9 ANXIETY: Chronic | ICD-10-CM

## 2025-05-28 DIAGNOSIS — I70.0 AORTIC ATHEROSCLEROSIS: Chronic | ICD-10-CM

## 2025-05-28 PROCEDURE — 99999 PR PBB SHADOW E&M-EST. PATIENT-LVL IV: CPT | Mod: PBBFAC,,, | Performed by: INTERNAL MEDICINE

## 2025-05-28 PROCEDURE — 99214 OFFICE O/P EST MOD 30 MIN: CPT | Mod: PBBFAC,PO | Performed by: INTERNAL MEDICINE

## 2025-05-28 PROCEDURE — G2211 COMPLEX E/M VISIT ADD ON: HCPCS | Mod: S$PBB,,, | Performed by: INTERNAL MEDICINE

## 2025-05-28 PROCEDURE — 99214 OFFICE O/P EST MOD 30 MIN: CPT | Mod: S$PBB,,, | Performed by: INTERNAL MEDICINE

## 2025-05-28 NOTE — PROGRESS NOTES
"Assessment:       1. Primary hypertension    2. Other hyperlipidemia    3. Bilateral carotid artery stenosis    4. Aortic atherosclerosis    5. Stage 3b chronic kidney disease    6. Cervical spondylosis    7. Lumbosacral radiculopathy at L5    8. Anxiety        Plan:       1. Continue labetaloll 100 mg 1.5 tablets twice daily, Norvasc 2.5 mg BID, and Tekturna 300 mg and Hydralazine as needed.  2/3/4.  Continue pravastatin 80 mg, Zetia 10 mg   5.  Continue to check CMP.    6/7.  Monitor   8. Continue Ativan 0.5 mg half tablet nightly.    Deep Scribe:  IMPRESSION:  1. BP control suboptimal based on current readings.  2. Considered increasing Amlodipine to 5 mg BID, pending confirmation of accurate BP readings.  3. Evaluated report of feeling "over-medicated" and drowsy after taking medications.  4. Adjusted medication timing to potentially reduce side effects and improve tolerability.    PLAN SUMMARY:   Double amlodipine dose to 5 mg morning and evening if BP readings confirmed accurate   Split medication doses to reduce peak effects and potential adverse reactions   Modify amlodipine (Norvasc) 2.5 mg and Tekturna 300 mg to be taken upon waking (around 10:00 AM)   Adjust labetalol 100 mg to 1.5 tablets at 7:00 AM   Order nurse visit for BP check and cuff accuracy verification   Ms. Lai to bring both BP cuffs to nurse visit for accuracy check and troubleshooting    ESSENTIAL HYPERTENSION:   Emphasized importance of BP control to prevent stroke or heart attack, despite concerns about medication effects.   Noted BP was elevated at approximately 150 mmHg this morning, which is considered acceptable for this patient.   Monitored the patient's hypertension on multiple medications including labetalol 100 mg 1.5 tablets twice daily, amlodipine 2.5 mg, Tekturna 300 mg, and hydralazine 25 mg as needed.   Modified labetalol 100 mg to 1.5 tablets at 7:00 AM.   Instructed the patient to bring both BP cuffs to the nurse visit " for accuracy check and troubleshooting.   Ordered nurse visit for BP check and cuff accuracy verification.   Planned to double the dose of amlodipine to 5 mg morning and 5 mg evening if BP readings are confirmed accurate.    ADVERSE EFFECTS OF MEDICATIONS:   Evaluated the patient who reports feeling over-medicated and experiencing discomfort after taking medications, indicating possible adverse effects.   Implemented plan to split medication doses to reduce peak effects and potential adverse reactions.   Explained rationale for splitting medication doses to achieve more consistent blood levels and potentially reduce side effects.    FALL RISK:   Evaluated the patient who fell approximately 2 weeks ago and sustained head trauma, which is concerning given current medication regimen.   Expressed concern about falls and planned medication adjustments to prevent future incidents.    DELAYED SLEEP PHASE DISORDER:   Evaluated the patient's sleep pattern, noting awakening at 7:00 AM followed by return to sleep until approximately 10:00 AM, consistent with delayed sleep phase disorder.   Adjusted medication timing to accommodate the patient's sleep schedule.   Modified amlodipine (Norvasc) 2.5 mg and Tekturna 300 mg to be taken upon waking (around 10:00 AM).               This note was generated with the assistance of ambient listening technology. Verbal consent was obtained by the patient and accompanying visitor(s) for the recording of patient appointment to facilitate this note. I attest to having reviewed and edited the generated note for accuracy, though some syntax or spelling errors may persist. Please contact the author of this note for any clarification.       Subjective:       Patient ID: Gerda Lai is a 89 y.o. female.    Chief Complaint: Follow-up    HPI    89-year-old female here for follow-up.    Patient has hypertension on labetalol 100 mg 1 and half tablets twice daily, Norvasc 2.5 mg twice daily,  Tekturna 300 mg. Hydralazine 25 mg  as needed.    Patient has hyperlipidemia, carotid artery stenosis, aortic atherosclerosis on pravastatin 80 mg, Zetia 10 mg.    Patient has stage IIIB chronic kidney disease monitor with CMP.    Patient has cervical spondylosis and lumbar radiculopathy on no current medication.    Patient has anxiety on Ativan 0.5 mg twice a day as needed.    History of Present Illness    CHIEF COMPLAINT:  Ms. Lai presents today for follow up of hypertension.    HYPERTENSION:  She reports home blood pressure readings of approximately 150. She has experienced difficulties with blood pressure monitoring due to issues with her blood pressure cuff curling at edges, resulting in frequent error readings. She has purchased a new monitor but has not started using it yet. She uses Hydralazine only when blood pressure readings exceed 170.    MEDICATION SIDE EFFECTS:  She reports feeling over-medicated, experiencing general malaise and drowsiness after taking medications around 11:00-11:30 AM. This limits her ability to leave home immediately after medication administration.    SLEEP:  She initially wakes at 7:00 AM but returns to sleep until approximately 10:00 AM.    RECENT INJURY:  She reports falling with impact to head approximately 3 weeks ago.    CURRENT MEDICATIONS:  - Labetalol 100 mg 1.5 tablets twice daily  - Amlodipine 2.5 mg  - Tekturna 200 mg  - Hydralazine 25 mg PRN for BP >170  - Pravastatin 80 mg  - Zetia 10 mg (taken at night)  - Ativan 0.5 mg BID PRN      ROS:  Constitutional: +excessive drowsiness  Head: +recent head injury  Neurological: +falling  Psychiatric: +anxiety         Review of Systems          Objective:      Physical Exam  Vitals reviewed.   Constitutional:       Appearance: She is well-developed.   HENT:      Head: Normocephalic and atraumatic.      Mouth/Throat:      Pharynx: No oropharyngeal exudate.   Eyes:      General: No scleral icterus.        Right eye: No  discharge.         Left eye: No discharge.      Pupils: Pupils are equal, round, and reactive to light.   Neck:      Thyroid: No thyromegaly.      Trachea: No tracheal deviation.   Cardiovascular:      Rate and Rhythm: Normal rate and regular rhythm.      Heart sounds: Normal heart sounds. No murmur heard.     No friction rub. No gallop.   Pulmonary:      Effort: Pulmonary effort is normal. No respiratory distress.      Breath sounds: Normal breath sounds. No wheezing or rales.   Chest:      Chest wall: No tenderness.   Abdominal:      General: Bowel sounds are normal. There is no distension.      Palpations: Abdomen is soft. There is no mass.      Tenderness: There is no abdominal tenderness. There is no guarding or rebound.   Musculoskeletal:         General: No tenderness. Normal range of motion.      Cervical back: Normal range of motion and neck supple.   Skin:     General: Skin is warm and dry.      Coloration: Skin is not pale.      Findings: No erythema or rash.   Neurological:      Mental Status: She is alert and oriented to person, place, and time.   Psychiatric:         Behavior: Behavior normal.

## 2025-05-28 NOTE — TELEPHONE ENCOUNTER
Dear: Ms. Gerda Lai,  Your appointment scheduled with Barbara Aldridge MD.  which was scheduled for 6/30/25 has been moved because the provider will not be available at this time. You have been rescheduled for 7/7/25. If this does not work for you, please contact us at [phone] to reschedule this appointment.    We apologize for any inconvenience this may have caused you.    Sincerely,  Marcus Cosby  [facility]

## 2025-05-28 NOTE — TELEPHONE ENCOUNTER
Please tell patient that new blood pressure cuff is accurate.  Have patient check blood pressures for the next week and send results in week.

## 2025-05-28 NOTE — PROGRESS NOTES
Pt taking hydrALAZINE (APRESOLINE) 25 MG once daily as needed (sys above 170), amLODIPine (NORVASC) 2.5 MG once daily, labetalol 100 mg half tablet twice daily.     Encounter for BP check. Pt brought an older cuff, which she has been using to track blood pressures at home, and a new cuff, which she has never used.   Seated, left arm, medium sized cuff    Old automatic: 143/39  New automatic: 142/68; 138/70 (taken immediately after manual reading)  Manual: 126/54    Informed pt that, based on later reading, new cuff can be used to track BP at home.

## 2025-06-06 ENCOUNTER — CLINICAL SUPPORT (OUTPATIENT)
Dept: AUDIOLOGY | Facility: CLINIC | Age: OVER 89
End: 2025-06-06
Payer: MEDICARE

## 2025-06-06 ENCOUNTER — OFFICE VISIT (OUTPATIENT)
Dept: OTOLARYNGOLOGY | Facility: CLINIC | Age: OVER 89
End: 2025-06-06
Payer: MEDICARE

## 2025-06-06 ENCOUNTER — TELEPHONE (OUTPATIENT)
Dept: PAIN MEDICINE | Facility: CLINIC | Age: OVER 89
End: 2025-06-06
Payer: MEDICARE

## 2025-06-06 DIAGNOSIS — Z97.4 WEARS HEARING AID: ICD-10-CM

## 2025-06-06 DIAGNOSIS — H61.23 BILATERAL IMPACTED CERUMEN: ICD-10-CM

## 2025-06-06 DIAGNOSIS — H90.3 SENSORINEURAL HEARING LOSS (SNHL), BILATERAL: Primary | ICD-10-CM

## 2025-06-06 PROCEDURE — 99999 PR PBB SHADOW E&M-EST. PATIENT-LVL III: CPT | Mod: PBBFAC,,,

## 2025-06-06 PROCEDURE — 99213 OFFICE O/P EST LOW 20 MIN: CPT | Mod: PBBFAC

## 2025-06-09 NOTE — PROGRESS NOTES
Flu (High dose) vaccine given as ordered. Two patient identifiers used, allergies reviewed, & vaccine verified. Administered to left deltoid.  Pt tolerated injection well; no swelling, redness, or bruising noted at injection site. Pt advised to remain in clinic 15 mins following injection for observation.   Team: please convey to patient: CT reviewed and findings as per CT impression pasted below; no new or concerning findings seen on most recent CT Chest. To schedule clinic appt with Dr Guillen or myself and order follow up CT 12/2025.  Thanks, Avelino    IMPRESSION:   1.  The spiculated nodule in the left upper lobe is stable.  2.  The mass in the left lower lobe is stable from March but has increased  from November. This was reportedly not avid on PET/CT  3.  Additional pulmonary nodules are stable.  4.  Improved left pleural effusion. Resolution of the right pleural  effusion.  5.  Stable hypodensity at the right hepatic dome.

## 2025-06-16 DIAGNOSIS — I10 HYPERTENSION, ESSENTIAL: Chronic | ICD-10-CM

## 2025-06-16 RX ORDER — LORAZEPAM 0.5 MG/1
TABLET ORAL
Qty: 30 TABLET | Refills: 2 | Status: SHIPPED | OUTPATIENT
Start: 2025-06-16

## 2025-06-16 NOTE — TELEPHONE ENCOUNTER
No care due was identified.  [facility] Embedded Care Due Messages. Reference number: [id number].   6/16/2025 11:56:16 AM CDT

## 2025-07-02 ENCOUNTER — TELEPHONE (OUTPATIENT)
Facility: CLINIC | Age: OVER 89
End: 2025-07-02
Payer: MEDICARE

## 2025-07-02 NOTE — TELEPHONE ENCOUNTER
Patient reports that at her last visit Dr. Aldridge recommended CBD cream and patient stopped using it as it was not helping her pain.     Patient having pain bilateral hands     Patient having Right shoulder pain - followed by [facility] on [facility] in August    Confirmed bilateral hand xray    Notified patient to stop at [place] [facility] - 1st floor check in [address] [facility] [address] 30 minutes prior to your appointment time on 7/7/25 with Dr. Aldridge for x-rays.     Made them aware that this is not a scheduled xray appointment and they might be running behind as they are considered a walk-in xray.    Verbalized the Following:  *Please arrive at your informed time above, if you are more than 15 Minutes late to your appointment with Dr. Aldridge we will have to reschedule your appointment. This will allow you to be seen in a timely manor and be conscious to other patients being seen that same day*       Ever Gaona MS, OTC   Sports Medicine Assistant   [facility]  (P) [phone]  (F) [phone]

## 2025-07-07 ENCOUNTER — OFFICE VISIT (OUTPATIENT)
Facility: CLINIC | Age: OVER 89
End: 2025-07-07
Payer: MEDICARE

## 2025-07-07 ENCOUNTER — HOSPITAL ENCOUNTER (OUTPATIENT)
Dept: RADIOLOGY | Facility: HOSPITAL | Age: OVER 89
Discharge: HOME | End: 2025-07-07
Attending: ORTHOPAEDIC SURGERY
Payer: MEDICARE

## 2025-07-07 VITALS — WEIGHT: 130.75 LBS | HEIGHT: 63 IN | BODY MASS INDEX: 23.17 KG/M2

## 2025-07-07 DIAGNOSIS — M79.642 CHRONIC PAIN OF LEFT HAND: ICD-10-CM

## 2025-07-07 DIAGNOSIS — G89.29 CHRONIC PAIN OF LEFT HAND: ICD-10-CM

## 2025-07-07 DIAGNOSIS — M19.041 PRIMARY OSTEOARTHRITIS OF BOTH HANDS: ICD-10-CM

## 2025-07-07 DIAGNOSIS — G89.29 CHRONIC PAIN OF RIGHT HAND: ICD-10-CM

## 2025-07-07 DIAGNOSIS — M79.641 CHRONIC PAIN OF RIGHT HAND: Primary | ICD-10-CM

## 2025-07-07 DIAGNOSIS — M72.0 DUPUYTREN DISEASE OF PALM OF RIGHT HAND: ICD-10-CM

## 2025-07-07 DIAGNOSIS — M19.042 PRIMARY OSTEOARTHRITIS OF BOTH HANDS: ICD-10-CM

## 2025-07-07 DIAGNOSIS — M79.641 CHRONIC PAIN OF RIGHT HAND: ICD-10-CM

## 2025-07-07 DIAGNOSIS — G89.29 CHRONIC PAIN OF RIGHT HAND: Primary | ICD-10-CM

## 2025-07-07 PROCEDURE — 99213 OFFICE O/P EST LOW 20 MIN: CPT | Mod: S$PBB,,, | Performed by: ORTHOPAEDIC SURGERY

## 2025-07-07 PROCEDURE — 73130 X-RAY EXAM OF HAND: CPT | Mod: 26,50,, | Performed by: RADIOLOGY

## 2025-07-07 PROCEDURE — 99213 OFFICE O/P EST LOW 20 MIN: CPT | Mod: PBBFAC,25 | Performed by: ORTHOPAEDIC SURGERY

## 2025-07-07 PROCEDURE — 99999 PR PBB SHADOW E&M-EST. PATIENT-LVL III: CPT | Mod: PBBFAC,,, | Performed by: ORTHOPAEDIC SURGERY

## 2025-07-07 PROCEDURE — 73130 X-RAY EXAM OF HAND: CPT | Mod: TC,50

## 2025-08-04 ENCOUNTER — OFFICE VISIT (OUTPATIENT)
Dept: DERMATOLOGY | Facility: CLINIC | Age: OVER 89
End: 2025-08-04
Payer: MEDICARE

## 2025-08-04 DIAGNOSIS — D22.9 NEVUS: ICD-10-CM

## 2025-08-04 DIAGNOSIS — Z85.828 PERSONAL HISTORY OF SKIN CANCER: ICD-10-CM

## 2025-08-04 DIAGNOSIS — L81.4 LENTIGO: ICD-10-CM

## 2025-08-04 DIAGNOSIS — L57.0 AK (ACTINIC KERATOSIS): Primary | ICD-10-CM

## 2025-08-04 DIAGNOSIS — L82.1 SK (SEBORRHEIC KERATOSIS): ICD-10-CM

## 2025-08-04 PROCEDURE — 99999 PR PBB SHADOW E&M-EST. PATIENT-LVL III: CPT | Mod: PBBFAC,,, | Performed by: DERMATOLOGY

## 2025-08-04 PROCEDURE — 99213 OFFICE O/P EST LOW 20 MIN: CPT | Mod: 25,S$PBB,, | Performed by: DERMATOLOGY

## 2025-08-04 PROCEDURE — 17003 DESTRUCT PREMALG LES 2-14: CPT | Mod: PBBFAC,PO | Performed by: DERMATOLOGY

## 2025-08-04 PROCEDURE — 17000 DESTRUCT PREMALG LESION: CPT | Mod: PBBFAC,PO | Performed by: DERMATOLOGY

## 2025-08-04 PROCEDURE — 99213 OFFICE O/P EST LOW 20 MIN: CPT | Mod: PBBFAC,PO | Performed by: DERMATOLOGY

## 2025-08-04 PROCEDURE — 17003 DESTRUCT PREMALG LES 2-14: CPT | Mod: S$PBB,,, | Performed by: DERMATOLOGY

## 2025-08-04 PROCEDURE — 17000 DESTRUCT PREMALG LESION: CPT | Mod: S$PBB,,, | Performed by: DERMATOLOGY

## 2025-08-04 NOTE — PROGRESS NOTES
Subjective:      Patient ID:  Gerda Lai is a 89 y.o. female who presents for   Chief Complaint   Patient presents with    Skin Check     FBSE     Patient here for Total Body Skin Exam    Last seen by dermatologist: 9/15/2023    Yes - personal history of atypical moles removed  No - personal history of MM   No - family history of MM  Yes - childhood blistering sunburns  No - tanning bed use  Yes - personal history of NMSC, extensive history of BCC and SCC    Patient with specific complaint of lesion(s)  Location: Upper lip  Duration: Months  Symptoms: Dry  Relieving factors/Previous treatments: Lip balm    Patient with new area of concern:   Location: L third finger  Duration: 1 yr  Symptoms: Scaly  Previous treatments: Aquaphor    Patient with new area of concern:   Location: Diffuse on face  Duration: years  No symptoms  Previous treatments: none      Review of Systems   Skin:  Negative for daily sunscreen use, activity-related sunscreen use, recent sunburn and wears hat.   Hematologic/Lymphatic: Bruises/bleeds easily (On blood thinners).       Objective:   Physical Exam   Constitutional: She appears well-developed and well-nourished. No distress.   Neurological: She is alert and oriented to person, place, and time. She is not disoriented.   Psychiatric: She has a normal mood and affect.   Skin:   Areas Examined (abnormalities noted in diagram):   Scalp / Hair Palpated and Inspected  Head / Face Inspection Performed  Neck Inspection Performed  Chest / Axilla Inspection Performed  Abdomen Inspection Performed  Back Inspection Performed  RUE Inspected  LUE Inspection Performed  RLE Inspected  LLE Inspection Performed  Nails and Digits Inspection Performed                     Diagram Legend     Erythematous scaling macule/papule c/w actinic keratosis       Vascular papule c/w angioma      Pigmented verrucoid papule/plaque c/w seborrheic keratosis      Yellow umbilicated papule c/w sebaceous hyperplasia       Irregularly shaped tan macule c/w lentigo     1-2 mm smooth white papules consistent with Milia      Movable subcutaneous cyst with punctum c/w epidermal inclusion cyst      Subcutaneous movable cyst c/w pilar cyst      Firm pink to brown papule c/w dermatofibroma      Pedunculated fleshy papule(s) c/w skin tag(s)      Evenly pigmented macule c/w junctional nevus     Mildly variegated pigmented, slightly irregular-bordered macule c/w mildly atypical nevus      Flesh colored to evenly pigmented papule c/w intradermal nevus       Pink pearly papule/plaque c/w basal cell carcinoma      Erythematous hyperkeratotic cursted plaque c/w SCC      Surgical scar with no sign of skin cancer recurrence      Open and closed comedones      Inflammatory papules and pustules      Verrucoid papule consistent consistent with wart     Erythematous eczematous patches and plaques     Dystrophic onycholytic nail with subungual debris c/w onychomycosis     Umbilicated papule    Erythematous-base heme-crusted tan verrucoid plaque consistent with inflamed seborrheic keratosis     Erythematous Silvery Scaling Plaque c/w Psoriasis     See annotation      Assessment / Plan:        AK (actinic keratosis)  Cryosurgery Procedure Note    Verbal consent from the patient is obtained including, but not limited to, risk of hypopigmentation/hyperpigmentation, scar, recurrence of lesion. The patient is aware of the precancerous quality and need for treatment of these lesions. Liquid nitrogen cryosurgery is applied to the 9 actinic keratoses, as detailed in the physical exam, to produce a freeze injury. The patient is aware that blisters may form and is instructed on wound care with gentle cleansing and use of vaseline ointment to keep moist until healed. The patient is supplied a handout on cryosurgery and is instructed to call if lesions do not completely resolve.    SK (seborrheic keratosis)  These are benign inherited growths without a malignant  potential. Reassurance given to patient. No treatment is necessary.     Lentigo  This is a benign hyperpigmented sun induced lesion. Recommend daily sun protection/avoidance and use of at least SPF 30, broad spectrum sunscreen (OTC drug) will reduce the number of new lesions. Treatment of these benign lesions are considered cosmetic.  The nature of sun-induced photo-aging and skin cancers is discussed.  Sun avoidance, protective clothing, and the use of 30-SPF sunscreens is advised. Observe closely for skin damage/changes, and call if such occurs.    Nevus  Discussed ABCDE's of nevi.  Monitor for new mole or moles that are becoming bigger, darker, irritated, or developing irregular borders. Brochure provided. Instructed patient to observe lesion(s) for changes and follow up in clinic if changes are noted. Patient to monitor skin at home for new or changing lesions.     Personal history of skin cancer  Pt with history of non melanoma skin cancer  Total body skin examination performed today including at least 12 points as noted in physical examination. No suspicious lesions noted.Monitor for new or changing lesions as discussed such as pink scaly patches that are occasionally tender or not healing, 'pimples' that never go away, lesions that are not healing or bleeding.              Follow up in about 1 year (around 8/4/2026) for TBSE.

## 2025-08-11 DIAGNOSIS — I65.23 BILATERAL CAROTID ARTERY STENOSIS: ICD-10-CM

## 2025-08-11 DIAGNOSIS — I70.0 AORTIC ATHEROSCLEROSIS: Chronic | ICD-10-CM

## 2025-08-11 DIAGNOSIS — E78.5 HYPERLIPIDEMIA, UNSPECIFIED HYPERLIPIDEMIA TYPE: Chronic | ICD-10-CM

## 2025-08-11 RX ORDER — PRAVASTATIN SODIUM 80 MG/1
80 TABLET ORAL DAILY
Qty: 90 TABLET | Refills: 0 | Status: SHIPPED | OUTPATIENT
Start: 2025-08-11

## 2025-08-14 ENCOUNTER — TELEPHONE (OUTPATIENT)
Dept: PAIN MEDICINE | Facility: CLINIC | Age: OVER 89
End: 2025-08-14

## 2025-08-14 ENCOUNTER — OFFICE VISIT (OUTPATIENT)
Dept: PAIN MEDICINE | Facility: CLINIC | Age: OVER 89
End: 2025-08-14
Payer: MEDICARE

## 2025-08-14 VITALS
HEART RATE: 58 BPM | WEIGHT: 134.06 LBS | SYSTOLIC BLOOD PRESSURE: 155 MMHG | HEIGHT: 63 IN | DIASTOLIC BLOOD PRESSURE: 61 MMHG | BODY MASS INDEX: 23.75 KG/M2

## 2025-08-14 DIAGNOSIS — M54.12 CERVICAL RADICULOPATHY: Primary | ICD-10-CM

## 2025-08-14 DIAGNOSIS — M25.511 CHRONIC RIGHT SHOULDER PAIN: Primary | ICD-10-CM

## 2025-08-14 DIAGNOSIS — M67.951 TENDINOPATHY OF RIGHT GLUTEAL REGION: ICD-10-CM

## 2025-08-14 DIAGNOSIS — M54.12 CERVICAL RADICULOPATHY: ICD-10-CM

## 2025-08-14 DIAGNOSIS — G57.01 PIRIFORMIS SYNDROME OF RIGHT SIDE: ICD-10-CM

## 2025-08-14 DIAGNOSIS — G89.29 CHRONIC RIGHT SHOULDER PAIN: Primary | ICD-10-CM

## 2025-08-14 DIAGNOSIS — M47.816 LUMBAR SPONDYLOSIS: ICD-10-CM

## 2025-08-14 DIAGNOSIS — M25.551 RIGHT HIP PAIN: ICD-10-CM

## 2025-08-14 PROCEDURE — 99213 OFFICE O/P EST LOW 20 MIN: CPT | Mod: PBBFAC | Performed by: STUDENT IN AN ORGANIZED HEALTH CARE EDUCATION/TRAINING PROGRAM

## 2025-08-14 PROCEDURE — 99999 PR PBB SHADOW E&M-EST. PATIENT-LVL III: CPT | Mod: PBBFAC,,, | Performed by: STUDENT IN AN ORGANIZED HEALTH CARE EDUCATION/TRAINING PROGRAM

## 2025-08-15 ENCOUNTER — TELEPHONE (OUTPATIENT)
Dept: PAIN MEDICINE | Facility: CLINIC | Age: OVER 89
End: 2025-08-15
Payer: MEDICARE

## 2025-08-15 DIAGNOSIS — G89.29 CHRONIC RIGHT SHOULDER PAIN: Primary | ICD-10-CM

## 2025-08-15 DIAGNOSIS — M25.511 CHRONIC RIGHT SHOULDER PAIN: Primary | ICD-10-CM

## 2025-08-22 RX ORDER — ASPIRIN 81 MG/1
81 TABLET ORAL
Qty: 90 TABLET | Refills: 2 | Status: SHIPPED | OUTPATIENT
Start: 2025-08-22

## 2025-08-25 ENCOUNTER — TELEPHONE (OUTPATIENT)
Dept: PAIN MEDICINE | Facility: CLINIC | Age: OVER 89
End: 2025-08-25
Payer: MEDICARE

## 2025-08-25 ENCOUNTER — E-CONSULT (OUTPATIENT)
Dept: INTERNAL MEDICINE | Facility: CLINIC | Age: OVER 89
End: 2025-08-25
Payer: MEDICARE

## 2025-08-25 DIAGNOSIS — I70.0 AORTIC ATHEROSCLEROSIS: ICD-10-CM

## 2025-08-25 DIAGNOSIS — M54.12 CERVICAL RADICULOPATHY: Primary | ICD-10-CM

## 2025-08-25 DIAGNOSIS — I10 HYPERTENSION, ESSENTIAL: Primary | ICD-10-CM

## 2025-08-25 DIAGNOSIS — E78.49 OTHER HYPERLIPIDEMIA: ICD-10-CM

## 2025-08-25 DIAGNOSIS — I65.23 BILATERAL CAROTID ARTERY STENOSIS: ICD-10-CM

## 2025-09-02 ENCOUNTER — HOSPITAL ENCOUNTER (OUTPATIENT)
Facility: HOSPITAL | Age: OVER 89
Discharge: HOME | End: 2025-09-02
Attending: STUDENT IN AN ORGANIZED HEALTH CARE EDUCATION/TRAINING PROGRAM | Admitting: STUDENT IN AN ORGANIZED HEALTH CARE EDUCATION/TRAINING PROGRAM
Payer: MEDICARE

## 2025-09-02 VITALS
BODY MASS INDEX: 23.74 KG/M2 | DIASTOLIC BLOOD PRESSURE: 77 MMHG | SYSTOLIC BLOOD PRESSURE: 183 MMHG | OXYGEN SATURATION: 96 % | RESPIRATION RATE: 16 BRPM | HEART RATE: 59 BPM | WEIGHT: 134 LBS | HEIGHT: 63 IN | TEMPERATURE: 98 F

## 2025-09-02 DIAGNOSIS — M54.12 CERVICAL RADICULOPATHY: Primary | ICD-10-CM

## 2025-09-02 PROCEDURE — 25500020 PHARM REV CODE 255: Performed by: STUDENT IN AN ORGANIZED HEALTH CARE EDUCATION/TRAINING PROGRAM

## 2025-09-02 PROCEDURE — 63600175 PHARM REV CODE 636 W HCPCS: Performed by: STUDENT IN AN ORGANIZED HEALTH CARE EDUCATION/TRAINING PROGRAM

## 2025-09-02 PROCEDURE — 62321 NJX INTERLAMINAR CRV/THRC: CPT | Performed by: STUDENT IN AN ORGANIZED HEALTH CARE EDUCATION/TRAINING PROGRAM

## 2025-09-02 PROCEDURE — 62321 NJX INTERLAMINAR CRV/THRC: CPT | Mod: ,,, | Performed by: STUDENT IN AN ORGANIZED HEALTH CARE EDUCATION/TRAINING PROGRAM

## 2025-09-02 RX ORDER — MIDAZOLAM HYDROCHLORIDE 1 MG/ML
INJECTION INTRAMUSCULAR; INTRAVENOUS
Status: DISCONTINUED | OUTPATIENT
Start: 2025-09-02 | End: 2025-09-02 | Stop reason: HOSPADM

## 2025-09-02 RX ORDER — LIDOCAINE HYDROCHLORIDE 20 MG/ML
INJECTION, SOLUTION EPIDURAL; INFILTRATION; INTRACAUDAL; PERINEURAL
Status: DISCONTINUED | OUTPATIENT
Start: 2025-09-02 | End: 2025-09-02 | Stop reason: HOSPADM

## 2025-09-02 RX ORDER — DEXAMETHASONE SODIUM PHOSPHATE 10 MG/ML
INJECTION, SOLUTION INTRA-ARTICULAR; INTRALESIONAL; INTRAMUSCULAR; INTRAVENOUS; SOFT TISSUE
Status: DISCONTINUED | OUTPATIENT
Start: 2025-09-02 | End: 2025-09-02 | Stop reason: HOSPADM

## 2025-09-02 RX ORDER — DIPHENHYDRAMINE HYDROCHLORIDE 50 MG/ML
50 INJECTION, SOLUTION INTRAMUSCULAR; INTRAVENOUS
Status: COMPLETED | OUTPATIENT
Start: 2025-09-02 | End: 2025-09-02

## 2025-09-02 RX ORDER — FENTANYL CITRATE 50 UG/ML
INJECTION, SOLUTION INTRAMUSCULAR; INTRAVENOUS
Status: DISCONTINUED | OUTPATIENT
Start: 2025-09-02 | End: 2025-09-02 | Stop reason: HOSPADM

## 2025-09-02 RX ORDER — SODIUM CHLORIDE 9 MG/ML
500 INJECTION, SOLUTION INTRAVENOUS CONTINUOUS
Status: DISCONTINUED | OUTPATIENT
Start: 2025-09-02 | End: 2025-09-02 | Stop reason: HOSPADM

## 2025-09-02 RX ADMIN — DIPHENHYDRAMINE HYDROCHLORIDE 50 MG: 50 INJECTION INTRAMUSCULAR; INTRAVENOUS at 11:09

## 2025-09-05 ENCOUNTER — TELEPHONE (OUTPATIENT)
Dept: PAIN MEDICINE | Facility: CLINIC | Age: OVER 89
End: 2025-09-05
Payer: MEDICARE

## (undated) DEVICE — DRESSING AQUACEL AG RBBN 2X45

## (undated) DEVICE — DRAPE T EXTRM SURG 121X128X90

## (undated) DEVICE — PUMP COLD THERAPY

## (undated) DEVICE — SYR 50CC LL

## (undated) DEVICE — SUT QUILL PDO VIOL CP 45CM 2

## (undated) DEVICE — BLADE SURG #15 CARBON STEEL

## (undated) DEVICE — STOCKINETTE DBL PLY ST 4X

## (undated) DEVICE — NDL SAFETY 22G X 1.5 ECLIPSE

## (undated) DEVICE — MARKER SKIN STND TIP BLUE BARR

## (undated) DEVICE — SEE MEDLINE ITEM 152528

## (undated) DEVICE — HOOD T7 W/ PEEL AWAY LENS

## (undated) DEVICE — SUT 2/0 36IN COATED VICRYL

## (undated) DEVICE — SUT PROLENE 3-0 FS-2 18

## (undated) DEVICE — DRESSING TRANS 4X4 TEGADERM

## (undated) DEVICE — GAUZE SPONGE 4X4 12PLY

## (undated) DEVICE — ADHESIVE DERMABOND ADVANCED

## (undated) DEVICE — TOURNIQUET SB QC SP 18X4IN

## (undated) DEVICE — DRESSING LEUKOPLAST FLEX 1X3IN

## (undated) DEVICE — PAD CAST SPECIALIST STRL 6

## (undated) DEVICE — SEE MEDLINE ITEM 146298

## (undated) DEVICE — BLADE DUAL CUT SAG 35X64X.89MM

## (undated) DEVICE — BRUSH SCRUB HIBICLENS 4%

## (undated) DEVICE — KIT NERVE BLOCK PREP BAPTIST

## (undated) DEVICE — DRESSING TELFA N ADH 3X8

## (undated) DEVICE — SEE MEDLINE ITEM 157144

## (undated) DEVICE — SUT 1 36IN COATED VICRYL UN

## (undated) DEVICE — KIT TOTAL KNEE TKOFG

## (undated) DEVICE — KIT VIZADISC KNEE TRACKING

## (undated) DEVICE — UNDERGLOVES BIOGEL PI SIZE 8

## (undated) DEVICE — NDL 18GA X1 1/2 REG BEVEL

## (undated) DEVICE — BLADE MAKO STANDARD

## (undated) DEVICE — SKIN MARKER DEVON 160

## (undated) DEVICE — DRAPE THREE-QTR REINF 53X77IN

## (undated) DEVICE — DRESSING N ADH OIL EMUL 3X3

## (undated) DEVICE — KNIFE CARPAL TUNNEL

## (undated) DEVICE — SOL NACL IRR 3000ML

## (undated) DEVICE — SUT CTD VICRYL CT-1 UND BR

## (undated) DEVICE — DRAPE SURG W/TWL 17 5/8X23

## (undated) DEVICE — WRAP PROTECTIVE LEG POS STRL

## (undated) DEVICE — PIN FIXATION BONE 140X3.2MM
Type: IMPLANTABLE DEVICE | Site: KNEE | Status: NON-FUNCTIONAL
Removed: 2023-05-22

## (undated) DEVICE — SUT MONOCYRL 4-0 PS2 UND

## (undated) DEVICE — SYS CLSR DERMABOND PRINEO 22CM

## (undated) DEVICE — DRAPE INCISE IOBAN 2 23X33IN

## (undated) DEVICE — NDL BOX COUNTER

## (undated) DEVICE — COVER PROXIMA MAYO STAND

## (undated) DEVICE — GLOVE BIOGEL SKINSENSE PI 8.0

## (undated) DEVICE — TOURNIQUET SB QC DP 34X4IN

## (undated) DEVICE — CEMENT BONE SURG SMPLX P RADPQ: Type: IMPLANTABLE DEVICE | Site: KNEE | Status: NON-FUNCTIONAL

## (undated) DEVICE — DRAPE STERI INSTRUMENT 1018

## (undated) DEVICE — SEE MEDLINE ITEM 146231

## (undated) DEVICE — SOL BETADINE 5%

## (undated) DEVICE — PULSAVAC ZIMMER

## (undated) DEVICE — UNDERGLOVES BIOGEL PI SIZE 7.5

## (undated) DEVICE — KIT TOTAL KNEE TKOFG OMC

## (undated) DEVICE — DRAPE STERI INCISE 17X23IN

## (undated) DEVICE — PAD KNEE POLAR XL

## (undated) DEVICE — ELECTRODE REM PLYHSV RETURN 9

## (undated) DEVICE — BLADE RECIP RIBBED

## (undated) DEVICE — CHLORAPREP 10.5 ML APPLICATOR

## (undated) DEVICE — SOL NACL IRR 1000ML BTL

## (undated) DEVICE — SUT MCRYL PLUS 4-0 PS2 27IN

## (undated) DEVICE — PAD CAST SPECIALIST STRL 3

## (undated) DEVICE — GLOVE BIOGEL PI MICRO SZ 7

## (undated) DEVICE — SEE MEDLINE ITEM 157131

## (undated) DEVICE — DRESSING AQUACEL AG ADV 3.5X12

## (undated) DEVICE — BLADE SAGITTAL 18 X 1.27 X 90M

## (undated) DEVICE — PIN BONE 3.2X110MM
Type: IMPLANTABLE DEVICE | Site: KNEE | Status: NON-FUNCTIONAL
Removed: 2023-05-22

## (undated) DEVICE — GLOVE BIOGEL SKINSENSE PI 7.5

## (undated) DEVICE — SEE MEDLINE ITEM 157150

## (undated) DEVICE — SOL 9P NACL IRR PIC IL

## (undated) DEVICE — KIT DRAPE RIO ONE PIECE W/POCK

## (undated) DEVICE — BANDAGE ESMARK 6X12

## (undated) DEVICE — SPONGE GAUZE 16PLY 4X4

## (undated) DEVICE — TAPE CURAD SILK ADH 3INX10YD

## (undated) DEVICE — TOWEL OR XRAY WHITE 17X26IN

## (undated) DEVICE — UNDERGLOVES BIOGEL PI SIZE 8.5

## (undated) DEVICE — GOWN SURGICAL XX LARGE X LONG

## (undated) DEVICE — SYS REVOLUTION CEMENT MIXING

## (undated) DEVICE — PAD COLD THERAPY KNEE WRAP ON

## (undated) DEVICE — ELECTRODE BLD 1 INCH TEFLON

## (undated) DEVICE — TOWEL OR DISP STRL BLUE 4/PK

## (undated) DEVICE — CATH SUCTION 10FR

## (undated) DEVICE — KIT CHECKPOINT MAKO

## (undated) DEVICE — ALCOHOL 70% ISOP W/GREEN 16OZ

## (undated) DEVICE — GOWN SMARTGOWN 3XL XLONG

## (undated) DEVICE — MASK FLYTE HOOD PEEL AWAY

## (undated) DEVICE — MIXER BONE CEMENT

## (undated) DEVICE — CONTAINER SPECIMEN STRL 4OZ

## (undated) DEVICE — SOL IRR NACL .9% 3000ML

## (undated) DEVICE — Device

## (undated) DEVICE — DRAPE TOP 53X102IN

## (undated) DEVICE — SCREW BONE 6.5X30 SELF-TAP
Type: IMPLANTABLE DEVICE | Site: KNEE | Status: NON-FUNCTIONAL
Removed: 2020-08-10

## (undated) DEVICE — KIT IRR SUCTION HND PIECE

## (undated) DEVICE — ALCOHOL 70% ISOP RUBBING 4OZ